# Patient Record
Sex: MALE | Race: WHITE | NOT HISPANIC OR LATINO | Employment: OTHER | URBAN - METROPOLITAN AREA
[De-identification: names, ages, dates, MRNs, and addresses within clinical notes are randomized per-mention and may not be internally consistent; named-entity substitution may affect disease eponyms.]

---

## 2019-10-22 ENCOUNTER — TELEPHONE (OUTPATIENT)
Dept: GASTROENTEROLOGY | Age: 74
End: 2019-10-22

## 2020-08-24 ENCOUNTER — HOSPITAL ENCOUNTER (EMERGENCY)
Facility: HOSPITAL | Age: 75
Discharge: HOME | End: 2020-08-24
Attending: EMERGENCY MEDICINE | Admitting: EMERGENCY MEDICINE
Payer: COMMERCIAL

## 2020-08-24 VITALS
OXYGEN SATURATION: 94 % | DIASTOLIC BLOOD PRESSURE: 64 MMHG | WEIGHT: 209 LBS | HEART RATE: 88 BPM | SYSTOLIC BLOOD PRESSURE: 147 MMHG | RESPIRATION RATE: 20 BRPM | TEMPERATURE: 97.1 F

## 2020-08-24 DIAGNOSIS — H72.91 PERFORATION OF RIGHT TYMPANIC MEMBRANE: Primary | ICD-10-CM

## 2020-08-24 PROCEDURE — 99284 EMERGENCY DEPT VISIT MOD MDM: CPT | Performed by: EMERGENCY MEDICINE

## 2020-08-24 PROCEDURE — 99283 EMERGENCY DEPT VISIT LOW MDM: CPT

## 2020-08-24 RX ORDER — ROSUVASTATIN CALCIUM 40 MG/1
TABLET, COATED ORAL
COMMUNITY
End: 2021-02-04 | Stop reason: SDUPTHER

## 2020-08-24 RX ORDER — BUDESONIDE AND FORMOTEROL FUMARATE DIHYDRATE 160; 4.5 UG/1; UG/1
AEROSOL RESPIRATORY (INHALATION)
COMMUNITY
End: 2021-09-02

## 2020-08-24 RX ORDER — CIPROFLOXACIN AND DEXAMETHASONE 3; 1 MG/ML; MG/ML
4 SUSPENSION/ DROPS AURICULAR (OTIC) 2 TIMES DAILY
Status: DISCONTINUED | OUTPATIENT
Start: 2020-08-24 | End: 2020-08-24

## 2020-08-24 RX ORDER — LOSARTAN POTASSIUM 25 MG/1
TABLET ORAL
COMMUNITY
End: 2021-02-04 | Stop reason: SDUPTHER

## 2020-08-24 RX ORDER — CIPROFLOXACIN HYDROCHLORIDE 3.5 MG/ML
1 SOLUTION/ DROPS TOPICAL ONCE
Status: COMPLETED | OUTPATIENT
Start: 2020-08-24 | End: 2020-08-24

## 2020-08-24 RX ORDER — SPIRONOLACTONE 25 MG/1
TABLET ORAL
COMMUNITY
End: 2020-11-13 | Stop reason: DRUGHIGH

## 2020-08-24 RX ORDER — FLUTICASONE PROPIONATE 50 MCG
SPRAY, SUSPENSION (ML) NASAL AS NEEDED
COMMUNITY

## 2020-08-24 RX ORDER — NITROGLYCERIN 0.4 MG/1
TABLET SUBLINGUAL
COMMUNITY

## 2020-08-24 RX ORDER — ASPIRIN 81 MG/1
TABLET, CHEWABLE ORAL
COMMUNITY

## 2020-08-24 RX ORDER — POTASSIUM CHLORIDE 20 MEQ/1
TABLET, EXTENDED RELEASE ORAL
COMMUNITY
End: 2020-11-13 | Stop reason: DRUGHIGH

## 2020-08-24 RX ORDER — FUROSEMIDE 40 MG/1
TABLET ORAL
COMMUNITY
End: 2020-11-13 | Stop reason: DRUGHIGH

## 2020-08-24 RX ORDER — METOPROLOL SUCCINATE 50 MG/1
50 TABLET, EXTENDED RELEASE ORAL
COMMUNITY
End: 2021-02-04 | Stop reason: ALTCHOICE

## 2020-08-24 RX ADMIN — CIPROFLOXACIN HYDROCHLORIDE 1 DROP: 3 SOLUTION/ DROPS OPHTHALMIC at 20:32

## 2020-08-25 NOTE — ED PROVIDER NOTES
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 psoas entirely reliant on his right ear which has now been damage  States that he is somewhat on healthy at baseline and requires multiple medications and is on oxygen for his COPD  None       Past Medical History:   Diagnosis Date    COPD (chronic obstructive pulmonary disease) ([facility] [address] )        History reviewed  No pertinent surgical history  History reviewed  No pertinent family history  I have reviewed and agree with the history as documented  E-Cigarette/Vaping    E-Cigarette Use Never User      E-Cigarette/Vaping Substances     Social History     Tobacco Use    Smoking status: Never Smoker    Smokeless tobacco: Never Used   Substance Use Topics    Alcohol use: Never     Frequency: Never    Drug use: Never       Review of Systems   Constitutional: Negative for chills and fever  Eyes: Negative for visual disturbance  Respiratory: Negative for shortness of breath  Cardiovascular: Negative for chest pain  Gastrointestinal: Negative for abdominal distention and abdominal pain  Endocrine: Negative for polyuria  Genitourinary: Negative for decreased urine volume and dysuria  Neurological: Negative for dizziness, syncope and weakness  Physical Exam  Physical Exam  Constitutional:       General: He is not in acute distress  Appearance: He is well-developed     HENT:      Head: Normocephalic and atraumatic  Ears:      Comments: Difficult to evaluate right side due to some blood as well as free material at the distal auditory canal   No sensitivity around the ear to palpation  TM appears intact in the visible portions  Area small healing wound at the base of the TM where it meets auditory canal   Eyes:      Conjunctiva/sclera: Conjunctivae normal       Pupils: Pupils are equal, round, and reactive to light  Neck:      Musculoskeletal: Normal range of motion and neck supple  Cardiovascular:      Rate and Rhythm: Normal rate and regular rhythm  Heart sounds: Normal heart sounds  Pulmonary:      Effort: Pulmonary effort is normal  No respiratory distress  Breath sounds: Normal breath sounds  Comments: On 2 L nasal cannula  Abdominal:      General: Bowel sounds are normal       Palpations: Abdomen is soft  Musculoskeletal: Normal range of motion  Skin:     General: Skin is warm and dry  Neurological:      Mental Status: He is alert and oriented to person, place, and time  Psychiatric:         Behavior: Behavior normal          Thought Content: Thought content normal          Judgment: Judgment normal          Vital Signs  ED Triage Vitals [08/24/20 1950]   Temperature Pulse Respirations Blood Pressure SpO2   (!) 97 1 °F (36 2 °C) 91 20 147/64 (!) 88 %      Temp src Heart Rate Source Patient Position - Orthostatic VS BP Location FiO2 (%)   -- -- -- -- --      Pain Score       2           Vitals:    08/24/20 1950   BP: 147/64   Pulse: 91         Visual Acuity      ED Medications  Medications   ciprofloxacin-dexamethasone (CIPRODEX) 0 3-0 1 % otic suspension 4 drop (has no administration in time range)       Diagnostic Studies  Results Reviewed     None                 No orders to display              Procedures  Procedures         ED Course       US AUDIT      Most Recent Value   Initial Alcohol Screen: US AUDIT-C    1   How often do you have a drink containing alcohol?  0 Filed at: 08/24/2020 1952   2  How many drinks containing alcohol do you have on a typical day you are drinking? 0 Filed at: 08/24/2020 1952   3a  Male UNDER 65: How often do you have five or more drinks on one occasion? 0 Filed at: 08/24/2020 1952   3b  FEMALE Any Age, or MALE 65+: How often do you have 4 or more drinks on one occassion? 0 Filed at: 08/24/2020 1952   Audit-C Score  0 Filed at: 08/24/2020 1952                  CRISTOPHER/DAST-10      Most Recent Value   How many times in the past year have you    Used an illegal drug or used a prescription medication for non-medical reasons? Never Filed at: 08/24/2020 1952                                [facility]  Number of Diagnoses or Management Options  Perforation of right tympanic membrane: new and requires workup  Diagnosis management comments: This is a 48-year-old male with question of perforation of his right tympanic membrane with question of early otitis externa  Patient started on Ciprodex  Discussed possible perforation precautions with the patient  Patient appeared clinically well and was still able to converse without difficulty  Discussed warning signs and symptoms with the patient as well as when to return to the emergency department versus follow up with [facility]  Patient states understanding and agreement with the plan  Disposition  Final diagnoses:   Perforation of right tympanic membrane     Time reflects when diagnosis was documented in both MDM as applicable and the Disposition within this note     Time User Action Codes Description Comment    8/24/2020  7:57 PM Fredy Cole [[id number]] Perforation of right tympanic membrane       ED Disposition     ED Disposition Condition Date/Time Comment    Discharge Stable Mon Aug 24, 2020  7:57 PM Annabel Young discharge to home/self care              Follow-up Information     Follow up With Specialties Details Why Contact Info Additional Information    [facility] Call in 1 day  [address] [facility] [id number]  [address] [place] [address] care, [address] [place] [address], [id number], [phone]          Patient's Medications   Discharge Prescriptions    CIPROFLOXACIN-DEXAMETHASONE (CIPRODEX) OTIC SUSPENSION    Administer 4 drops into ears 2 (two) times a day for 7 days       Start Date: 8/24/2020 End Date: 8/31/2020       Order Dose: 4 drops       Quantity: 7 5 mL    Refills: 0     No discharge procedures on file      PDMP Review     None          ED Provider  Electronically Signed by           Lida Foy MD  08/24/20 2005

## 2020-09-26 ENCOUNTER — NURSE TRIAGE (OUTPATIENT)
Dept: OTHER | Facility: OTHER | Age: 75
End: 2020-09-26

## 2020-09-26 DIAGNOSIS — Z20.822 ENCOUNTER FOR LABORATORY TESTING FOR COVID-19 VIRUS: Primary | ICD-10-CM

## 2020-09-26 NOTE — TELEPHONE ENCOUNTER
Pt new to the area, helped pt use [facility] and explained how to set the filters to find new PCP and specialists  Reason for Disposition   [1] COVID-19 infection suspected by caller or triager AND [2] mild symptoms (cough, fever, or others) AND [2] no complications or SOB    Additional Information   [1] Symptoms of COVID-19 (e g , cough, fever, SOB, or others) AND [2] within 14 days of EXPOSURE (close contact) with diagnosed or suspected COVID-19 patient    Answer Assessment - Initial Assessment Questions  1  CLOSE CONTACT: "Who is the person with the confirmed or suspected COVID-19 infection that you were exposed to?"      Friends    2  PLACE of CONTACT: "Where were you when you were exposed to COVID-19?" (e g , home, school, medical waiting room; which city?)      Home    3  TYPE of CONTACT: "How much contact was there?" (e g , sitting next to, live in same house, work in same office, same building)      Visit    4  DURATION of CONTACT: "How long were you in contact with the COVID-19 patient?" (e g , a few seconds, passed by person, a few minutes, live with the patient)      Few hours    5  DATE of CONTACT: "When did you have contact with a COVID-19 patient?" (e g , how many days ago)      A week ago  6  TRAVEL: "Have you traveled out of the country recently?" If so, "When and where?"      * Also ask about out-of-state travel, since the CDC has identified some high-risk cities for community spread in the [address]  * Note: Travel becomes less relevant if there is widespread community transmission where the patient lives  Denies    7  COMMUNITY SPREAD: "Are there lots of cases of COVID-19 (community spread) where you live?" (See public health department website, if unsure)        Unknown    8  SYMPTOMS: "Do you have any symptoms?" (e g , fever, cough, breathing difficulty)      Cough, cold, chest tightness, runny nose, watery eyes, sore throat    10   HIGH RISK: "Do you have any heart or lung problems?  Do you have a weak immune system?" (e g , CHF, COPD, asthma, HIV positive, chemotherapy, renal failure, diabetes mellitus, sickle cell anemia)        COPD    Protocols used: CORONAVIRUS (COVID-19) DIAGNOSED OR SUSPECTED-ADULT-AH, CORONAVIRUS (COVID-19) EXPOSURE-ADULT-AH

## 2020-09-26 NOTE — TELEPHONE ENCOUNTER
Regarding: COVID Test Request - Symptomatic (Family 1/2)  ----- Message from Regine Sacnhez sent at 9/26/2020  3:14 PM EDT -----  "I have COPD, and my doctor recommended that I get tested for COVID because I'm experiencing some cold-like symptoms   I have a runny nose, a cough, tightness in my chest, runny eyes, and a sore throat "

## 2020-09-28 DIAGNOSIS — Z20.822 ENCOUNTER FOR LABORATORY TESTING FOR COVID-19 VIRUS: ICD-10-CM

## 2020-09-28 PROCEDURE — U0003 INFECTIOUS AGENT DETECTION BY NUCLEIC ACID (DNA OR RNA); SEVERE ACUTE RESPIRATORY SYNDROME CORONAVIRUS 2 (SARS-COV-2) (CORONAVIRUS DISEASE [COVID-19]), AMPLIFIED PROBE TECHNIQUE, MAKING USE OF HIGH THROUGHPUT TECHNOLOGIES AS DESCRIBED BY CMS-2020-01-R: HCPCS | Performed by: FAMILY MEDICINE

## 2020-09-30 LAB — SARS-COV-2 RNA SPEC QL NAA+PROBE: NOT DETECTED

## 2020-11-13 ENCOUNTER — OFFICE VISIT (OUTPATIENT)
Dept: CARDIOLOGY CLINIC | Facility: CLINIC | Age: 75
End: 2020-11-13
Payer: COMMERCIAL

## 2020-11-13 VITALS
HEIGHT: 68 IN | SYSTOLIC BLOOD PRESSURE: 112 MMHG | HEART RATE: 72 BPM | DIASTOLIC BLOOD PRESSURE: 76 MMHG | WEIGHT: 214 LBS | BODY MASS INDEX: 32.43 KG/M2

## 2020-11-13 DIAGNOSIS — I10 ESSENTIAL HYPERTENSION: ICD-10-CM

## 2020-11-13 DIAGNOSIS — I65.23 BILATERAL CAROTID ARTERY STENOSIS: ICD-10-CM

## 2020-11-13 DIAGNOSIS — E78.2 MIXED HYPERLIPIDEMIA: ICD-10-CM

## 2020-11-13 DIAGNOSIS — I25.10 CORONARY ARTERY DISEASE INVOLVING NATIVE CORONARY ARTERY OF NATIVE HEART WITHOUT ANGINA PECTORIS: ICD-10-CM

## 2020-11-13 DIAGNOSIS — I50.9 CONGESTIVE HEART FAILURE, UNSPECIFIED HF CHRONICITY, UNSPECIFIED HEART FAILURE TYPE (HCC): Primary | ICD-10-CM

## 2020-11-13 PROCEDURE — 93000 ELECTROCARDIOGRAM COMPLETE: CPT | Performed by: INTERNAL MEDICINE

## 2020-11-13 PROCEDURE — 99204 OFFICE O/P NEW MOD 45 MIN: CPT | Performed by: INTERNAL MEDICINE

## 2020-11-13 RX ORDER — CLOPIDOGREL BISULFATE 75 MG/1
75 TABLET ORAL DAILY
COMMUNITY
End: 2021-01-22 | Stop reason: ALTCHOICE

## 2020-11-30 DIAGNOSIS — I10 ESSENTIAL HYPERTENSION: Primary | ICD-10-CM

## 2020-11-30 RX ORDER — POTASSIUM CHLORIDE 20 MEQ/1
20 TABLET, EXTENDED RELEASE ORAL DAILY PRN
Qty: 90 TABLET | Refills: 5 | Status: ON HOLD
Start: 2020-11-30 | End: 2021-02-21 | Stop reason: CLARIF

## 2020-11-30 RX ORDER — FUROSEMIDE 40 MG/1
40 TABLET ORAL AS NEEDED
Qty: 90 TABLET | Refills: 5 | Status: ON HOLD
Start: 2020-11-30 | End: 2021-02-21 | Stop reason: CLARIF

## 2020-12-11 ENCOUNTER — HOSPITAL ENCOUNTER (OUTPATIENT)
Dept: NON INVASIVE DIAGNOSTICS | Facility: CLINIC | Age: 75
Discharge: HOME | End: 2020-12-11
Payer: COMMERCIAL

## 2020-12-11 DIAGNOSIS — I25.10 CORONARY ARTERY DISEASE INVOLVING NATIVE CORONARY ARTERY OF NATIVE HEART WITHOUT ANGINA PECTORIS: ICD-10-CM

## 2020-12-11 PROCEDURE — 93306 TTE W/DOPPLER COMPLETE: CPT | Performed by: INTERNAL MEDICINE

## 2020-12-11 PROCEDURE — 93306 TTE W/DOPPLER COMPLETE: CPT

## 2020-12-14 ENCOUNTER — HOSPITAL ENCOUNTER (OUTPATIENT)
Dept: NON INVASIVE DIAGNOSTICS | Facility: HOSPITAL | Age: 75
Discharge: HOME | End: 2020-12-14
Attending: INTERNAL MEDICINE
Payer: COMMERCIAL

## 2020-12-14 DIAGNOSIS — I65.23 BILATERAL CAROTID ARTERY STENOSIS: ICD-10-CM

## 2020-12-14 PROCEDURE — 93880 EXTRACRANIAL BILAT STUDY: CPT | Performed by: SURGERY

## 2020-12-14 PROCEDURE — 93880 EXTRACRANIAL BILAT STUDY: CPT

## 2021-01-06 LAB
BUN SERPL-MCNC: 25 MG/DL (ref 8–27)
BUN/CREAT SERPL: 20 (ref 10–24)
CHLORIDE SERPL-SCNC: 100 MMOL/L (ref 96–106)
CHOLEST SERPL-MCNC: 139 MG/DL (ref 100–199)
CO2 SERPL-SCNC: 30 MMOL/L (ref 20–29)
CREAT SERPL-MCNC: 1.26 MG/DL (ref 0.76–1.27)
GLUCOSE SERPL-MCNC: 101 MG/DL (ref 65–99)
HDLC SERPL-MCNC: 55 MG/DL
LDLC SERPL CALC-MCNC: 67 MG/DL (ref 0–99)
POTASSIUM SERPL-SCNC: 4.5 MMOL/L (ref 3.5–5.2)
SL AMB EGFR AFRICAN AMERICAN: 64 ML/MIN/1.73
SL AMB EGFR NON AFRICAN AMERICAN: 55 ML/MIN/1.73
SL AMB VLDL CHOLESTEROL CALC: 17 MG/DL (ref 5–40)
SODIUM SERPL-SCNC: 143 MMOL/L (ref 134–144)
TRIGL SERPL-MCNC: 88 MG/DL (ref 0–149)

## 2021-01-07 ENCOUNTER — OFFICE VISIT (OUTPATIENT)
Dept: FAMILY MEDICINE CLINIC | Facility: CLINIC | Age: 76
End: 2021-01-07
Payer: COMMERCIAL

## 2021-01-07 VITALS
HEIGHT: 68 IN | BODY MASS INDEX: 32.77 KG/M2 | HEART RATE: 72 BPM | SYSTOLIC BLOOD PRESSURE: 120 MMHG | DIASTOLIC BLOOD PRESSURE: 70 MMHG | WEIGHT: 216.2 LBS | OXYGEN SATURATION: 94 % | RESPIRATION RATE: 16 BRPM | TEMPERATURE: 98 F

## 2021-01-07 DIAGNOSIS — Z76.89 ENCOUNTER TO ESTABLISH CARE: ICD-10-CM

## 2021-01-07 DIAGNOSIS — R10.84 GENERALIZED ABDOMINAL PAIN: Primary | ICD-10-CM

## 2021-01-07 DIAGNOSIS — G56.22 CUBITAL TUNNEL SYNDROME ON LEFT: ICD-10-CM

## 2021-01-07 PROCEDURE — 99204 OFFICE O/P NEW MOD 45 MIN: CPT | Performed by: FAMILY MEDICINE

## 2021-01-07 PROCEDURE — 3725F SCREEN DEPRESSION PERFORMED: CPT | Performed by: FAMILY MEDICINE

## 2021-01-07 PROCEDURE — 3288F FALL RISK ASSESSMENT DOCD: CPT | Performed by: FAMILY MEDICINE

## 2021-01-07 PROCEDURE — 1101F PT FALLS ASSESS-DOCD LE1/YR: CPT | Performed by: FAMILY MEDICINE

## 2021-01-07 RX ORDER — AZITHROMYCIN 250 MG/1
TABLET, FILM COATED ORAL
COMMUNITY
Start: 2020-10-01 | End: 2021-02-04 | Stop reason: SDUPTHER

## 2021-01-07 RX ORDER — GABAPENTIN 100 MG/1
CAPSULE ORAL
COMMUNITY
Start: 2020-12-07 | End: 2021-02-04 | Stop reason: ALTCHOICE

## 2021-01-07 RX ORDER — SPIRONOLACTONE 25 MG/1
TABLET ORAL
COMMUNITY
Start: 2020-10-12 | End: 2021-01-22

## 2021-01-07 RX ORDER — POTASSIUM CHLORIDE 7.45 MG/ML
INJECTION INTRAVENOUS
COMMUNITY
Start: 2020-10-12 | End: 2021-02-04 | Stop reason: ALTCHOICE

## 2021-01-07 NOTE — PROGRESS NOTES
Parrish Presley 1945 male MRN: [id number]  [facility] [place]    Visit to Establish Care: Family Medicine    Assessment/Plan     Generalized abdominal pain  - Unclear etiology of chronic abdominal and back pain, previously worked up by prior PCP, possibly secondary to chronic use of accessory respiratory muscles in setting of severe COPD, hernia on differential  - Referral to general surgery for evaluation, possible hernia as etiology for pain? Could consider CT abdomen   - Return precautions discussed     Kiel Landeros was seen today for establish care  Diagnoses and all orders for this visit:    Generalized abdominal pain  -     Ambulatory referral to General Surgery; Future    Cubital tunnel syndrome on left  -     Ambulatory referral to Orthopedic Surgery; Future    Encounter to establish care        In addition to the above, the patient was counseled on general preventative health care subjects, including but not limited to:  - Nutrition, healthy weight, aerobic and weight-bearing exercise  - Mental health, social support, and self care  - Advised of the importance of dental hygiene and routine dental visits   - Patient made aware of  services at the office  SUBJECTIVE    HPI:  Parrish Presley is a 67 y o  male who presented to establish care with this family medicine practice  Pain- In December, he was talking to his prior PCP about rib pain, getting worse, started in November or so  It happens all the time, located on both sides, more so on the right side  Pain is all through the lower ribs, constant pain with intermittent sharpness with movement or coughing  Continued lower back pain as well  History of COPD, reports the bottom half of his lungs don't work, was following with pulmonology specialist  Has sleep apnea, uses CPAP and portable oxygen at night typically oxygen runs in upper 80s   Breathing has gotten progressively worse over past year, upcoming appointment with Dr Parish Olivares 1/22  Nonspecific upper abdominal pain with this, fairly regular bowel movements, no blood in stool  He is able to exhale much longer if lying flat and pushing on stomach  Back pain started prior to all of this, occurring for at least a year  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, no history of CVA or TIA, improved, no new leg numbness or weakness  Left arm numbness- Would like referral to hand specialist  Left arm numbness started a month ago, in pinky finger and ring finger just on left  Lost perception in pinky finger, no weakness  No elbow pain or shoulder pain  He also mentions cold feet and hands, will follow-up next visit  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, left ear  Review of Systems   Constitutional: Negative for chills and fever  HENT: Positive for hearing loss  Eyes: Negative for discharge and redness  Respiratory: Positive for shortness of breath (chronic COPD)  Negative for cough, chest tightness and wheezing  Cardiovascular: Negative for chest pain, palpitations and leg swelling  Gastrointestinal: Positive for abdominal pain  Negative for blood in stool, constipation, diarrhea, nausea and vomiting  Genitourinary: Negative for decreased urine volume, difficulty urinating and dysuria  Musculoskeletal: Negative for gait problem  Skin: Negative for rash  Neurological: Positive for weakness and numbness  Historical Information   Past Medical History:   Diagnosis Date    COPD [facility]chronic obstructive pulmonary disease) ([facility] [address] )     Coronary artery disease     history of stenting    Hyperlipidemia     Hypertension      History reviewed  No pertinent surgical history  No family history on file    Social History     Socioeconomic History    Marital status: /Civil Union     Spouse name: Not on file    Number of children: Not on file    Years of education: Not on file    Highest education level: Not on file   Occupational History    Not on file   Social Needs    Financial resource strain: Not on file    Food insecurity     Worry: Not on file     Inability: Not on file    Transportation needs     Medical: Not on file     Non-medical: Not on file   Tobacco Use    Smoking status: Former Smoker     Quit date:      Years since quittin 0    Smokeless tobacco: Never Used   Substance and Sexual Activity    Alcohol use: Never     Frequency: Never    Drug use: Never    Sexual activity: Not on file   Lifestyle    Physical activity     Days per week: Not on file     Minutes per session: Not on file    Stress: Not on file   Relationships    Social connections     Talks on phone: Not on file     Gets together: Not on file     Attends Orthodox service: Not on file     Active member of club or organization: Not on file     Attends meetings of clubs or organizations: Not on file     Relationship status: Not on file    Intimate partner violence     Fear of current or ex partner: Not on file     Emotionally abused: Not on file     Physically abused: Not on file     Forced sexual activity: Not on file   Other Topics Concern    Not on file   Social History Narrative    Not on file           Medications:      Current Outpatient Medications:     albuterol (5 mg/mL) 0 5 % nebulizer solution, INHALE  INHALATION, Disp: , Rfl:     aspirin 81 mg chewable tablet, CHEW ONE TABLET BY MOUTH EVERY DAY, Disp: , Rfl:     azithromycin (ZITHROMAX) 250 mg tablet,  See Instructions, TAKE 1 TABLET EVERY MONDAY, WEDNESDAY AND FRIDAY, # 26 tab(s), 5 Refill(s), Pharmacy: [facility], 169, 20 11:08:00 EDT, Height/Length, cm, 90 718, 20 11:08:00 EDT, Weight Measured, kg, Disp: , Rfl:     budesonide-formoterol (SYMBICORT) 160-4 5 mcg/act inhaler, INHALE TWO INHALATIONS BY MOUTH TWICE A DAY - RINSE MOUTH AFTER USE, Disp: , Rfl:     fluticasone (FLONASE) 50 mcg/act nasal spray, USE 1 SPRAY IN EACH NOSTRIL TWICE A DAY, Disp: , Rfl:    furosemide (LASIX) 40 mg tablet, Take 1 tablet (40 mg total) by mouth as needed (only when legs swollen in am ), Disp: 90 tablet, Rfl: 5    gabapentin (NEURONTIN) 100 mg capsule, , Disp: , Rfl:     losartan (COZAAR) 25 mg tablet, TAKE ONE TABLET BY MOUTH ONCE DAILY, Disp: , Rfl:     metoprolol succinate (KAPSPARGO SPRINKLE) 25 MG extended-release capsule,  = 1 tab(s), Oral, bid, # 180 tab(s), 1 Refill(s), Pharmacy: [facility], [address], 06/01/20 11:08:00 EDT, Height/Length, cm, 90 718, 06/01/20 11:08:00 EDT, Weight Measured, kg, Disp: , Rfl:     metoprolol succinate (TOPROL-XL) 50 mg 24 hr tablet, 50 mg , Disp: , Rfl:     nitroglycerin (NITROSTAT) 0 4 mg SL tablet, DISSOLVE ONE TABLET UNDER THE TONGUE  PRN, Disp: , Rfl:     potassium chloride (K-DUR,KLOR-CON) 20 mEq tablet, Take 1 tablet (20 mEq total) by mouth daily as needed (morning leg swelling ), Disp: 90 tablet, Rfl: 5    potassium chloride 10 mEq,  = 1 tab(s), Oral, daily, # 90 tab(s), 1 Refill(s), Pharmacy: [facility], [facility], 06/01/20 11:08:00 EDT, Height/Length, cm, 90 718, 06/01/20 11:08:00 EDT, Weight Measured, kg, Disp: , Rfl:     rosuvastatin (CRESTOR) 40 MG tablet, TAKE ONE TABLET BY MOUTH ONCE DAILY, Disp: , Rfl:     spironolactone (ALDACTONE) 25 mg tablet,  = 1 tab(s), Oral, daily, # 90 tab(s), 1 Refill(s), Pharmacy: [facility], [facility], 06/01/20 11:08:00 EDT, Height/Length, cm, 90 718, 06/01/20 11:08:00 EDT, Weight Measured, kg, Disp: , Rfl:     tiotropium (SPIRIVA) 18 mcg inhalation capsule, INHALE CONTENTS OF ONE CAPSULE BY MOUTH ONCE DAILY USING HANDIHALER DEVICE, Disp: , Rfl:     ciprofloxacin-dexamethasone (Ciprodex) otic suspension, Administer 4 drops into ears 2 (two) times a day for 7 days (Patient not taking: Reported on 11/13/2020), Disp: 7 5 mL, Rfl: 0    ciprofloxacin-hydrocortisone (CIPRO HC OTIC) otic suspension, Administer 3 drops to the right ear 2 (two) times a day (Patient not taking: Reported on 11/13/2020), Disp: 10 mL, Rfl: 0    clopidogrel (PLAVIX) 75 mg tablet, Take 75 mg by mouth daily, Disp: , Rfl:       Physical Exam:    Physical Exam  Vitals signs reviewed  Constitutional:       General: He is not in acute distress  Appearance: Normal appearance  He is not ill-appearing, toxic-appearing or diaphoretic  HENT:      Head: Normocephalic and atraumatic  Eyes:      General:         Right eye: No discharge  Left eye: No discharge  Extraocular Movements: Extraocular movements intact  Conjunctiva/sclera: Conjunctivae normal    Neck:      Musculoskeletal: Normal range of motion and neck supple  No neck rigidity  Cardiovascular:      Rate and Rhythm: Normal rate and regular rhythm  Heart sounds: Normal heart sounds  No murmur  No friction rub  No gallop  Pulmonary:      Effort: Pulmonary effort is normal  No respiratory distress  Breath sounds: Normal breath sounds  No stridor  No wheezing or rhonchi  Chest:      Chest wall: No deformity or tenderness  Abdominal:      General: Bowel sounds are normal  There is no distension  Palpations: Abdomen is soft  There is no mass  Tenderness: There is abdominal tenderness  There is no guarding or rebound  Comments: Diastasis recti    Musculoskeletal:         General: No tenderness or signs of injury  Left elbow: He exhibits normal range of motion, no swelling, no effusion and no deformity  No tenderness found  Lumbar back: He exhibits spasm  Right lower leg: No edema  Left lower leg: No edema  Comments: Positive Tinel's at elbow   Skin:     General: Skin is warm  Findings: No erythema or rash  Neurological:      Mental Status: He is alert and oriented to person, place, and time  Motor: No weakness     Psychiatric:         Mood and Affect: Mood normal          Behavior: Behavior normal             Future Appointments   Date Time Provider Department Center   1/15/2021 11:00 AM Jonathan Shea MD Gen [facility]   1/22/2021  9:00 AM [email or web address]   2/4/2021 11:00 AM DO ALYSSA Maldonado  FP  [facility]

## 2021-01-11 VITALS — BODY MASS INDEX: 32.74 KG/M2 | HEIGHT: 68 IN | WEIGHT: 216 LBS

## 2021-01-11 DIAGNOSIS — G56.22 CUBITAL TUNNEL SYNDROME ON LEFT: Primary | ICD-10-CM

## 2021-01-11 PROBLEM — R42 VERTIGO: Status: ACTIVE | Noted: 2021-01-11

## 2021-01-11 PROBLEM — R73.03 PREDIABETES: Status: ACTIVE | Noted: 2021-01-11

## 2021-01-11 PROBLEM — E87.6 CHRONIC HYPOKALEMIA: Status: ACTIVE | Noted: 2021-01-11

## 2021-01-11 PROBLEM — I50.22 CHRONIC SYSTOLIC CONGESTIVE HEART FAILURE (HCC): Status: ACTIVE | Noted: 2021-01-11

## 2021-01-11 PROBLEM — H53.9 VISUAL DISTURBANCE: Status: ACTIVE | Noted: 2021-01-11

## 2021-01-11 PROBLEM — K21.9 GASTROESOPHAGEAL REFLUX DISEASE: Status: ACTIVE | Noted: 2021-01-11

## 2021-01-11 PROBLEM — I50.20 CONGESTIVE HEART FAILURE WITH LEFT VENTRICULAR SYSTOLIC DYSFUNCTION (HCC): Status: ACTIVE | Noted: 2021-01-11

## 2021-01-11 PROBLEM — H25.10 NUCLEAR SENILE CATARACT: Status: ACTIVE | Noted: 2021-01-11

## 2021-01-11 PROBLEM — R10.84 GENERALIZED ABDOMINAL PAIN: Status: ACTIVE | Noted: 2021-01-11

## 2021-01-11 PROBLEM — R09.02 HYPOXEMIA: Status: ACTIVE | Noted: 2021-01-11

## 2021-01-11 PROBLEM — E04.1 THYROID NODULE: Status: ACTIVE | Noted: 2021-01-11

## 2021-01-11 PROBLEM — H91.90 HEARING LOSS: Status: ACTIVE | Noted: 2021-01-11

## 2021-01-11 PROBLEM — Z95.5 STENTED CORONARY ARTERY: Status: ACTIVE | Noted: 2021-01-11

## 2021-01-11 PROBLEM — M47.819 OSTEOARTHRITIS OF SPINAL FACET JOINT: Status: ACTIVE | Noted: 2021-01-11

## 2021-01-11 PROBLEM — R91.1 SOLITARY PULMONARY NODULE: Status: ACTIVE | Noted: 2021-01-11

## 2021-01-11 PROBLEM — H04.129 DRY EYE SYNDROME: Status: ACTIVE | Noted: 2021-01-11

## 2021-01-11 PROBLEM — J44.9 COPD (CHRONIC OBSTRUCTIVE PULMONARY DISEASE) (HCC): Status: ACTIVE | Noted: 2021-01-11

## 2021-01-11 PROBLEM — I25.9 CHRONIC ISCHEMIC HEART DISEASE: Status: ACTIVE | Noted: 2021-01-11

## 2021-01-11 PROBLEM — I10 BENIGN ESSENTIAL HYPERTENSION: Status: RESOLVED | Noted: 2021-01-11 | Resolved: 2021-01-11

## 2021-01-11 PROBLEM — M54.16 LUMBAR RADICULOPATHY: Status: ACTIVE | Noted: 2021-01-11

## 2021-01-11 PROBLEM — Z99.81 OXYGEN DEPENDENT: Status: ACTIVE | Noted: 2021-01-11

## 2021-01-11 PROBLEM — K57.30 DIVERTICULAR DISEASE OF COLON: Status: ACTIVE | Noted: 2021-01-11

## 2021-01-11 PROBLEM — I65.29 STENOSIS OF CAROTID ARTERY: Status: ACTIVE | Noted: 2021-01-11

## 2021-01-11 PROBLEM — I65.29 STENOSIS OF CAROTID ARTERY: Status: RESOLVED | Noted: 2021-01-11 | Resolved: 2021-01-11

## 2021-01-11 PROBLEM — G47.30 SLEEP APNEA: Status: ACTIVE | Noted: 2021-01-11

## 2021-01-11 PROBLEM — R97.20 HIGH PROSTATE SPECIFIC ANTIGEN (PSA): Status: ACTIVE | Noted: 2021-01-11

## 2021-01-11 PROBLEM — R73.9 HYPERGLYCEMIA: Status: ACTIVE | Noted: 2021-01-11

## 2021-01-11 PROBLEM — E04.2 MULTINODULAR GOITER: Status: ACTIVE | Noted: 2021-01-11

## 2021-01-11 PROBLEM — H52.31 ANISOMETROPIA: Status: ACTIVE | Noted: 2021-01-11

## 2021-01-11 PROBLEM — J44.9 CHRONIC OBSTRUCTIVE PULMONARY DISEASE (COPD) (HCC): Status: RESOLVED | Noted: 2021-01-11 | Resolved: 2021-01-11

## 2021-01-11 PROBLEM — F32.A DEPRESSION: Status: ACTIVE | Noted: 2021-01-11

## 2021-01-11 PROBLEM — F52.8 PSYCHOSEXUAL DYSFUNCTION WITH INHIBITED SEXUAL EXCITEMENT: Status: ACTIVE | Noted: 2021-01-11

## 2021-01-11 PROBLEM — Z98.890 S/P CAROTID ENDARTERECTOMY: Status: ACTIVE | Noted: 2021-01-11

## 2021-01-11 PROBLEM — Z96.1 LENS REPLACED BY OTHER MEANS: Status: ACTIVE | Noted: 2021-01-11

## 2021-01-11 PROBLEM — E66.9 OBESITY: Status: ACTIVE | Noted: 2021-01-11

## 2021-01-11 PROBLEM — I10 BENIGN ESSENTIAL HYPERTENSION: Status: ACTIVE | Noted: 2021-01-11

## 2021-01-11 PROBLEM — I50.22 CHRONIC SYSTOLIC CONGESTIVE HEART FAILURE (HCC): Status: RESOLVED | Noted: 2021-01-11 | Resolved: 2021-01-11

## 2021-01-11 PROBLEM — J44.9 CHRONIC OBSTRUCTIVE PULMONARY DISEASE (COPD) (HCC): Status: ACTIVE | Noted: 2021-01-11

## 2021-01-11 PROBLEM — M16.9 OSTEOARTHRITIS OF HIP: Status: ACTIVE | Noted: 2021-01-11

## 2021-01-11 PROBLEM — I65.29 OCCLUSION AND STENOSIS OF CAROTID ARTERY: Status: ACTIVE | Noted: 2021-01-11

## 2021-01-11 PROCEDURE — 99203 OFFICE O/P NEW LOW 30 MIN: CPT | Performed by: ORTHOPAEDIC SURGERY

## 2021-01-11 NOTE — PROGRESS NOTES
Assessment/Plan:  Assessment/Plan   Diagnoses and all orders for this visit:    Cubital tunnel syndrome on left  -     Ambulatory referral to Orthopedic Surgery  -     EMG 1 Limb; Future    Discussed with patient that today's physical exam and subjective findings are consistent with cubital tunnel syndrome of the left elbow  Patient is being referred for EMG study of the left upper extremity for further evaluation  He will be seen for follow-up after diagnostic study is complete  Subjective:   Patient ID: Luis A Pepe is a 67 y o  male  HPI    Patient presents for consultation at the request of Manolo Carlson DO in regards to left hand numbness and tingling x2 months  Patient denies any specific incident of injury  Notes that his symptoms are bothersome both at night, and with prolonged gripping motions such as driving  He describes numbness and tingling that extends down the ulnar aspect of the forearm and into the small finger and ring fingers of the left hand  He reports occasional feelings of weakness, but denies any associated bruising, swelling, feelings of instability, or mechanical symptoms  The following portions of the patient's history were reviewed and updated as appropriate: allergies, current medications, past family history, past medical history, past social history, past surgical history and problem list     Past Medical History:   Diagnosis Date    COPD (chronic obstructive pulmonary disease) ([facility] [address] )     Coronary artery disease     history of stenting    Hyperlipidemia     Hypertension      History reviewed  No pertinent surgical history  History reviewed  No pertinent family history      Social History     Socioeconomic History    Marital status: /Civil Union     Spouse name: None    Number of children: None    Years of education: None    Highest education level: None   Occupational History    None   Social Needs    Financial resource strain: None   Patsy-Patrice insecurity     Worry: None     Inability: None    Transportation needs     Medical: None     Non-medical: None   Tobacco Use    Smoking status: Former Smoker     Quit date:      Years since quittin 0    Smokeless tobacco: Never Used   Substance and Sexual Activity    Alcohol use: Never     Frequency: Never    Drug use: Never    Sexual activity: None   Lifestyle    Physical activity     Days per week: None     Minutes per session: None    Stress: None   Relationships    Social connections     Talks on phone: None     Gets together: None     Attends Buddhism service: None     Active member of club or organization: None     Attends meetings of clubs or organizations: None     Relationship status: None    Intimate partner violence     Fear of current or ex partner: None     Emotionally abused: None     Physically abused: None     Forced sexual activity: None   Other Topics Concern    None   Social History Narrative    None       Current Outpatient Medications:     albuterol (5 mg/mL) 0 5 % nebulizer solution, INHALE  INHALATION, Disp: , Rfl:     aspirin 81 mg chewable tablet, CHEW ONE TABLET BY MOUTH EVERY DAY, Disp: , Rfl:     azithromycin (ZITHROMAX) 250 mg tablet,  See Instructions, TAKE 1 TABLET EVERY MONDAY, WEDNESDAY AND FRIDAY, # 26 tab(s), 5 Refill(s), Pharmacy: [facility], 169, 20 11:08:00 EDT, Height/Length, cm, 90 718, 20 11:08:00 EDT, Weight Measured, kg, Disp: , Rfl:     budesonide-formoterol (SYMBICORT) 160-4 5 mcg/act inhaler, INHALE TWO INHALATIONS BY MOUTH TWICE A DAY - RINSE MOUTH AFTER USE, Disp: , Rfl:     clopidogrel (PLAVIX) 75 mg tablet, Take 75 mg by mouth daily, Disp: , Rfl:     fluticasone (FLONASE) 50 mcg/act nasal spray, USE 1 SPRAY IN EACH NOSTRIL TWICE A DAY, Disp: , Rfl:     furosemide (LASIX) 40 mg tablet, Take 1 tablet (40 mg total) by mouth as needed (only when legs swollen in am ), Disp: 90 tablet, Rfl: 5    gabapentin (NEURONTIN) 100 mg capsule, , Disp: , Rfl:     losartan (COZAAR) 25 mg tablet, TAKE ONE TABLET BY MOUTH ONCE DAILY, Disp: , Rfl:     metoprolol succinate (KAPSPARGO SPRINKLE) 25 MG extended-release capsule,  = 1 tab(s), Oral, bid, # 180 tab(s), 1 Refill(s), Pharmacy: [facility], 169, 06/01/20 11:08:00 EDT, Height/Length, cm, 90 718, 06/01/20 11:08:00 EDT, Weight Measured, kg, Disp: , Rfl:     metoprolol succinate (TOPROL-XL) 50 mg 24 hr tablet, 50 mg , Disp: , Rfl:     nitroglycerin (NITROSTAT) 0 4 mg SL tablet, DISSOLVE ONE TABLET UNDER THE TONGUE  PRN, Disp: , Rfl:     potassium chloride (K-DUR,KLOR-CON) 20 mEq tablet, Take 1 tablet (20 mEq total) by mouth daily as needed (morning leg swelling ), Disp: 90 tablet, Rfl: 5    potassium chloride 10 mEq,  = 1 tab(s), Oral, daily, # 90 tab(s), 1 Refill(s), [facility]: [facility], 169, 06/01/20 11:08:00 EDT, Height/Length, cm, 90 718, 06/01/20 11:08:00 EDT, Weight Measured, kg, Disp: , Rfl:     rosuvastatin (CRESTOR) 40 MG tablet, TAKE ONE TABLET BY MOUTH ONCE DAILY, Disp: , Rfl:     spironolactone (ALDACTONE) 25 mg tablet,  = 1 tab(s), Oral, daily, # 90 tab(s), 1 Refill(s), Pharmacy: [facility], 169, 06/01/20 11:08:00 EDT, Height/Length, cm, 90 718, 06/01/20 11:08:00 EDT, Weight Measured, kg, Disp: , Rfl:     tiotropium (SPIRIVA) 18 mcg inhalation capsule, INHALE CONTENTS OF ONE CAPSULE BY MOUTH ONCE DAILY USING HANDIHALER DEVICE, Disp: , Rfl:     ciprofloxacin-dexamethasone (Ciprodex) otic suspension, Administer 4 drops into ears 2 (two) times a day for 7 days (Patient not taking: Reported on 11/13/2020), Disp: 7 5 mL, Rfl: 0    ciprofloxacin-hydrocortisone (CIPRO HC OTIC) otic suspension, Administer 3 drops to the right ear 2 (two) times a day (Patient not taking: Reported on 11/13/2020), Disp: 10 mL, Rfl: 0    Allergies   Allergen Reactions    Influenza Vaccine Live     Lisinopril Swelling    Pneumococcal Vaccine Swelling    Tetanus Antitoxin     Tetanus Toxoid Swelling       Review of Systems   Constitutional: Negative for chills, fever and unexpected weight change  HENT: Negative for hearing loss, nosebleeds and sore throat  Eyes: Negative for pain, redness and visual disturbance  Respiratory: Negative for cough, shortness of breath and wheezing  Cardiovascular: Negative for chest pain, palpitations and leg swelling  Gastrointestinal: Negative for abdominal pain, nausea and vomiting  Endocrine: Negative for polydipsia and polyuria  Genitourinary: Negative for dysuria and hematuria  Musculoskeletal:        As noted in HPI   Skin: Negative for rash and wound  Neurological: Positive for numbness (left upper extremity)  Negative for dizziness and headaches  Psychiatric/Behavioral: Negative for decreased concentration and suicidal ideas  The patient is not nervous/anxious  Objective:  Ht 5' 8" (1 727 m)   Wt 98 kg (216 lb)   BMI 32 84 kg/m²     Ortho Exam   Left elbow -   No obvious anatomical deformity  Skin is warm and dry to touch with no signs of erythema, ecchymosis, or infection  Active and passive ROM 0 degrees-125 degrees  Elbow is stable to varus and valgus stress  Negative ulnar nerve subluxation  Mildly positive ulnar nerve flexion compression test  Mildly positive Tinel's at carpal tunnel  Negative Tinel's at cubital tunnel  2+ distal radial pulse with brisk capillary refill to the fingers  Radial, median, and ulnar motor and sensory distributions intact  Sensation light touch intact distally    Physical Exam  Constitutional:       Appearance: He is well-developed  HENT:      Right Ear: External ear normal       Left Ear: External ear normal       Nose: Nose normal    Eyes:      Conjunctiva/sclera: Conjunctivae normal       Pupils: Pupils are equal, round, and reactive to light  Neck:      Musculoskeletal: Normal range of motion     Pulmonary:      Effort: Pulmonary effort is normal    Musculoskeletal:      Comments:  As noted in HPI   Skin:     General: Skin is warm and dry  Neurological:      Mental Status: He is alert and oriented to person, place, and time  Psychiatric:         Behavior: Behavior normal          Thought Content:  Thought content normal          Judgment: Judgment normal        Imaging:  No imaging reviewed this visit    Scribe Attestation    I,:  Demarco Cook am acting as a scribe while in the presence of the attending physician :       I,:  Raine Vyas DO personally performed the services described in this documentation    as scribed in my presence :

## 2021-01-12 NOTE — ASSESSMENT & PLAN NOTE
- Unclear etiology of chronic abdominal and back pain, previously worked up by prior PCP, possibly secondary to chronic use of accessory respiratory muscles in setting of severe COPD, hernia on differential  - Referral to general surgery for evaluation, possible hernia as etiology for pain?  Could consider CT abdomen   - Return precautions discussed

## 2021-01-15 ENCOUNTER — CONSULT (OUTPATIENT)
Dept: SURGERY | Facility: CLINIC | Age: 76
End: 2021-01-15
Payer: COMMERCIAL

## 2021-01-15 ENCOUNTER — APPOINTMENT (OUTPATIENT)
Dept: RADIOLOGY | Facility: CLINIC | Age: 76
End: 2021-01-15
Payer: COMMERCIAL

## 2021-01-15 VITALS
SYSTOLIC BLOOD PRESSURE: 120 MMHG | TEMPERATURE: 97.1 F | HEART RATE: 68 BPM | DIASTOLIC BLOOD PRESSURE: 62 MMHG | RESPIRATION RATE: 16 BRPM | WEIGHT: 217 LBS | HEIGHT: 68 IN | BODY MASS INDEX: 32.89 KG/M2

## 2021-01-15 DIAGNOSIS — R07.89 COSTOCHONDRAL CHEST PAIN: Primary | ICD-10-CM

## 2021-01-15 DIAGNOSIS — R10.84 GENERALIZED ABDOMINAL PAIN: ICD-10-CM

## 2021-01-15 DIAGNOSIS — R07.89 COSTOCHONDRAL CHEST PAIN: ICD-10-CM

## 2021-01-15 PROCEDURE — 71111 X-RAY EXAM RIBS/CHEST4/> VWS: CPT

## 2021-01-15 PROCEDURE — 99203 OFFICE O/P NEW LOW 30 MIN: CPT | Performed by: SURGERY

## 2021-01-15 PROCEDURE — 72070 X-RAY EXAM THORAC SPINE 2VWS: CPT

## 2021-01-15 PROCEDURE — 3074F SYST BP LT 130 MM HG: CPT | Performed by: SURGERY

## 2021-01-15 PROCEDURE — 3078F DIAST BP <80 MM HG: CPT | Performed by: SURGERY

## 2021-01-15 NOTE — ASSESSMENT & PLAN NOTE
The patient is a pleasant 76-year-old male with a past medical history significant for oxygen dependent COPD presenting with bilateral subcostal abdominal wall pain in the upper quadrants bilaterally  Patient notes a chronic low-grade discomfort in the upper abdomen bilaterally  He does report acute flares of pain lasting very briefly associated with coughing  He has had no specific testing or investigations into its etiology previously  Today on physical examination the patient is well-nourished well-appearing male  He is in no acute distress  Awake alert oriented  Competent reliable as a historian  Inspection and palpation of the abdominal wall reveals a normal appearing thoracic cage  He has no tenderness to percussion or palpation sent in the upper abdomen bilaterally as well as over the sternum or costal margin  His history and physical examination is most suggestive of an acute on chronic costochondritis  To further investigate his symptoms I have ordered rib films as well as a chest x-ray and thoracic spine imaging  I have room advised him to take Tylenol or ibuprofen on a as needed basis  I look for to seeing him back after the imaging studies at which point will make additional diagnostic and therapeutic treatment recommendations accordingly

## 2021-01-15 NOTE — PROGRESS NOTES
Assessment/Plan:    Costochondral chest pain  The patient is a pleasant 41-year-old male with a past medical history significant for oxygen dependent COPD presenting with bilateral subcostal abdominal wall pain in the upper quadrants bilaterally  Patient notes a chronic low-grade discomfort in the upper abdomen bilaterally  He does report acute flares of pain lasting very briefly associated with coughing  He has had no specific testing or investigations into its etiology previously  Today on physical examination the patient is well-nourished well-appearing male  He is in no acute distress  Awake alert oriented  Competent reliable as a historian  Inspection and palpation of the abdominal wall reveals a normal appearing thoracic cage  He has no tenderness to percussion or palpation sent in the upper abdomen bilaterally as well as over the sternum or costal margin  His history and physical examination is most suggestive of an acute on chronic costochondritis  To further investigate his symptoms I have ordered rib films as well as a chest x-ray and thoracic spine imaging  I have room advised him to take Tylenol or ibuprofen on a as needed basis  I look for to seeing him back after the imaging studies at which point will make additional diagnostic and therapeutic treatment recommendations accordingly  Subjective:      Patient ID: Boyd Snellen is a 67 y o  male  Patient is here today for generalized abdominal pain that has been present 3 months or less  Stated that the pain starts mid abdomen that radiates to his lower back  No nausea, vomiting,chills, but states that sometimes he gets a fever in the evening  Dena Rogers MA          Review of Systems   Constitutional: Negative for chills and fever  HENT: Negative for ear pain and sore throat  Eyes: Negative for pain and visual disturbance  Respiratory: Positive for cough and shortness of breath      Cardiovascular: Negative for chest pain and palpitations  Gastrointestinal: Positive for abdominal distention and abdominal pain  Negative for vomiting  The abdominal distension is exacerbated by his CPAP   Genitourinary: Negative for dysuria and hematuria  Musculoskeletal: Negative for arthralgias and back pain  Skin: Negative for color change and rash  Neurological: Negative for seizures and syncope  All other systems reviewed and are negative  Objective:      /62 (BP Location: Left arm, Patient Position: Sitting, Cuff Size: Standard)   Pulse 68   Temp (!) 97 1 °F (36 2 °C) (Tympanic)   Resp 16   Ht 5' 8" (1 727 m)   Wt 98 4 kg (217 lb)   BMI 32 99 kg/m²          Physical Exam  Vitals signs and nursing note reviewed  Constitutional:       Appearance: He is well-developed  HENT:      Head: Normocephalic and atraumatic  Eyes:      Conjunctiva/sclera: Conjunctivae normal       Pupils: Pupils are equal, round, and reactive to light  Neck:      Musculoskeletal: Normal range of motion and neck supple  Cardiovascular:      Rate and Rhythm: Normal rate and regular rhythm  Pulmonary:      Effort: Pulmonary effort is normal       Breath sounds: Normal breath sounds  Abdominal:      General: Bowel sounds are normal       Palpations: Abdomen is soft  Musculoskeletal: Normal range of motion  Skin:     General: Skin is warm and dry  Neurological:      Mental Status: He is alert and oriented to person, place, and time  Psychiatric:         Behavior: Behavior normal          Thought Content:  Thought content normal          Judgment: Judgment normal

## 2021-01-15 NOTE — PATIENT INSTRUCTIONS
1  Tylenol 650mg or ibuprofen 400mg by mouth every 6 hours as needed for pain    Costochondritis   AMBULATORY CARE:   Costochondritis  is a condition that causes pain in the cartilage that connects your ribs to your sternum (breastbone)  Cartilage is the tough, bendable tissue that protects your bones  Common symptoms include the following:   · Sharp or dull, aching pain that may come and go or that gets worse over time    · Pain when you touch your chest    · Pain that spreads to your back, abdomen, or down your arm    · Pain that gets worse when you move, breathe deeply, or push or lift an object    · Trouble sleeping or doing your usual activities because of the pain    Seek immediate care if:   · Fever    · The painful areas of your chest look swollen and red, and feel warm to the touch    · Pain prevents you from sleeping    Contact your healthcare provider if:   · You have a fever  · The painful areas of your chest look swollen, red, and feel warm to the touch  · You cannot sleep because of the pain  · You have questions or concerns about your condition or care  Treatment for costochondritis  may not be needed  Costochondritis pain may go away without treatment, usually within a year  Treatment may include any of the following:  · Acetaminophen  decreases pain  Acetaminophen is available without a doctor's order  Ask how much to take and how often to take it  Follow directions  Acetaminophen can cause liver damage if not taken correctly  · NSAIDs , such as ibuprofen, help decrease swelling, pain, and fever  This medicine is available with or without a doctor's order  NSAIDs can cause stomach bleeding or kidney problems in certain people  If you take blood thinner medicine, always ask if NSAIDs are safe for you  Always read the medicine label and follow directions  Do not give these medicines to children under 10months of age without direction from your child's healthcare provider      Manage your symptoms:   · Rest as needed  Avoid painful movements and activities  Do not carry objects, such as a purse or backpack, if this causes pain  Avoid activities such as weightlifting until your pain decreases or goes away  Ask your healthcare provider which activities are best for you to do while you recover  · Apply heat to help decrease pain  Apply heat on the area for 20 to 30 minutes every 2 hours for as many days as directed  · Apply ice to help decrease swelling and pain  Ice may also help prevent tissue damage  Use an ice pack, or put crushed ice in a plastic bag  Cover it with a towel and place it on the painful area for 15 to 20 minutes every hour or as directed  · Do gentle stretching exercises   a doorway and put your hands on the door frame at the level of your ears or shoulders  Take 1 step forward and gently stretch your chest  Try this with your hands higher up on the doorway  Follow up with your healthcare provider as directed:  Write down your questions so you remember to ask them during your visits  © Copyright [employer] Information is for End User's use only and may not be sold, redistributed or otherwise used for commercial purposes  All illustrations and images included in CareNotes® are the copyrighted property of A D A M , Inc  or [address] [place], [place] [address]   The above information is an  only  It is not intended as medical advice for individual conditions or treatments  Talk to your doctor, nurse or pharmacist before following any medical regimen to see if it is safe and effective for you

## 2021-01-15 NOTE — LETTER
January 15, 2021     Felipeerictanja Jimenes, [address] [place] [place] [address]    Patient: Ash Clements   YOB: 1945   Date of Visit: 1/15/2021       Dear Dr Gopal Ramirez: Thank you for referring Herbie Dumont to me for evaluation  Below are my notes for this consultation  If you have questions, please do not hesitate to call me  I look forward to following your patient along with you  Sincerely,        Roc Darden MD        CC: No Recipients  Roc Darden MD  1/15/2021 12:31 PM  Incomplete  Assessment/Plan:    Costochondral chest pain  The patient is a pleasant 79-year-old male with a past medical history significant for oxygen dependent COPD presenting with bilateral subcostal abdominal wall pain in the upper quadrants bilaterally  Patient notes a chronic low-grade discomfort in the upper abdomen bilaterally  He does report acute flares of pain lasting very briefly associated with coughing  He has had no specific testing or investigations into its etiology previously  Today on physical examination the patient is well-nourished well-appearing male  He is in no acute distress  Awake alert oriented  Competent reliable as a historian  Inspection and palpation of the abdominal wall reveals a normal appearing thoracic cage  He has no tenderness to percussion or palpation sent in the upper abdomen bilaterally as well as over the sternum or costal margin  His history and physical examination is most suggestive of an acute on chronic costochondritis  To further investigate his symptoms I have ordered rib films as well as a chest x-ray and thoracic spine imaging  I have room advised him to take Tylenol or ibuprofen on a as needed basis  I look for to seeing him back after the imaging studies at which point will make additional diagnostic and therapeutic treatment recommendations accordingly              Subjective:      Patient ID: Ash Clements is a 67 y o  male  Patient is here today for generalized abdominal pain that has been present 3 months or less  Stated that the pain starts mid abdomen that radiates to his lower back  No nausea, vomiting,chills, but states that sometimes he gets a fever in the evening  Dena Rogers MA          Review of Systems   Constitutional: Negative for chills and fever  HENT: Negative for ear pain and sore throat  Eyes: Negative for pain and visual disturbance  Respiratory: Positive for cough and shortness of breath  Cardiovascular: Negative for chest pain and palpitations  Gastrointestinal: Positive for abdominal distention and abdominal pain  Negative for vomiting  The abdominal distension is exacerbated by his CPAP   Genitourinary: Negative for dysuria and hematuria  Musculoskeletal: Negative for arthralgias and back pain  Skin: Negative for color change and rash  Neurological: Negative for seizures and syncope  All other systems reviewed and are negative  Objective:      /62 (BP Location: Left arm, Patient Position: Sitting, Cuff Size: Standard)   Pulse 68   Temp (!) 97 1 °F (36 2 °C) (Tympanic)   Resp 16   Ht 5' 8" (1 727 m)   Wt 98 4 kg (217 lb)   BMI 32 99 kg/m²          Physical Exam  Vitals signs and nursing note reviewed  Constitutional:       Appearance: He is well-developed  HENT:      Head: Normocephalic and atraumatic  Eyes:      Conjunctiva/sclera: Conjunctivae normal       Pupils: Pupils are equal, round, and reactive to light  Neck:      Musculoskeletal: Normal range of motion and neck supple  Cardiovascular:      Rate and Rhythm: Normal rate and regular rhythm  Pulmonary:      Effort: Pulmonary effort is normal       Breath sounds: Normal breath sounds  Abdominal:      General: Bowel sounds are normal       Palpations: Abdomen is soft  Musculoskeletal: Normal range of motion  Skin:     General: Skin is warm and dry     Neurological:      Mental Status: He is alert and oriented to person, place, and time  Psychiatric:         Behavior: Behavior normal          Thought Content:  Thought content normal          Judgment: Judgment normal

## 2021-01-20 ENCOUNTER — TELEPHONE (OUTPATIENT)
Dept: OTHER | Facility: OTHER | Age: 76
End: 2021-01-20

## 2021-01-22 ENCOUNTER — OFFICE VISIT (OUTPATIENT)
Dept: PULMONOLOGY | Facility: CLINIC | Age: 76
End: 2021-01-22
Payer: COMMERCIAL

## 2021-01-22 VITALS
HEART RATE: 85 BPM | DIASTOLIC BLOOD PRESSURE: 56 MMHG | TEMPERATURE: 96 F | OXYGEN SATURATION: 91 % | BODY MASS INDEX: 32.77 KG/M2 | WEIGHT: 216.2 LBS | SYSTOLIC BLOOD PRESSURE: 87 MMHG | HEIGHT: 68 IN

## 2021-01-22 DIAGNOSIS — G47.30 SLEEP APNEA, UNSPECIFIED TYPE: ICD-10-CM

## 2021-01-22 DIAGNOSIS — J43.1 PANLOBULAR EMPHYSEMA (HCC): Primary | ICD-10-CM

## 2021-01-22 DIAGNOSIS — I25.9 CHRONIC ISCHEMIC HEART DISEASE: ICD-10-CM

## 2021-01-22 PROCEDURE — 99205 OFFICE O/P NEW HI 60 MIN: CPT | Performed by: INTERNAL MEDICINE

## 2021-01-22 NOTE — ASSESSMENT & PLAN NOTE
Unfortunately I have no access to PFTs in [facility] hesitant to get repeat once given the fact that he has passed out from them before  I assume he has severe COPD based on his history and his oxygen requirements  Assess the case he is on appropriate therapy with Symbicort and Spiriva  I have asked him to use a spacer device with the Symbicort 2 puffs twice a day in addition to his Spiriva once a day  He has an updated nebulizer and rescue inhaler  We went through COPD Education packet together and provided approximately 5 minutes of education  I referred him for pulmonary rehab today  I reviewed his most recent CT scan as well as his 2D echocardiogram   He is due for repeat CT scan which I have scheduled

## 2021-01-22 NOTE — ASSESSMENT & PLAN NOTE
Haley Medico appears well compensated with regards to his heart failure which is diastolic in nature  He has normal heart rate and blood pressure on today's exam   He is up-to-date on his flu and pneumonia vaccines and recently received his 1st coronavirus vaccine

## 2021-01-22 NOTE — PROGRESS NOTES
Assessment/Plan:    COPD (chronic obstructive pulmonary disease) ([facility] [address] )  Unfortunately I have no access to PFTs in [facility]'s hesitant to get repeat once given the fact that he has passed out from them before  I assume he has severe COPD based on his history and his oxygen requirements  Assess the case he is on appropriate therapy with Symbicort and Spiriva  I have asked him to use a spacer device with the Symbicort 2 puffs twice a day in addition to his Spiriva once a day  He has an updated nebulizer and rescue inhaler  We went through COPD Education packet together and provided approximately 5 minutes of education  I referred him for pulmonary rehab today  I reviewed his most recent CT scan as well as his 2D echocardiogram   He is due for repeat CT scan which I have scheduled  Sleep apnea  Shlomo's wearing his CPAP all night every night with his oxygen  He is compliant has no daytime sleepiness  Chronic ischemic heart disease  Shlomo appears well compensated with regards to his heart failure which is diastolic in nature  He has normal heart rate and blood pressure on today's exam   He is up-to-date on his flu and pneumonia vaccines and recently received his 1st coronavirus vaccine  Diagnoses and all orders for this visit:    [facility]se[facility] ([facility] )  -     CT chest without contrast; Future  -     Ambulatory Referral to Pulmonary Rehabilitation; Future    Sleep apnea, unspecified type    Chronic ischemic heart disease          Subjective:      Patient ID: Joanne Malloy is a 67 y o  male  Pollie Jay is here to establish care for his COPD and sleep apnea  He is a 2 pack-per-day smoker for 35 years but quit in [address] [place] [address] after having heart attack  He worked as a  an  but 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 and was ensured duty on the [employer]  He currently takes Symbicort twice a day and Spiriva once daily and uses albuterol about once a week    He has been hospitalized approximately 10 times for his breathing  He maintains on Zithromax Monday Wednesday and Friday  He denies any triggers for his breathing other than when he gets an upper respiratory infection  He gets short of breath with stairs and inclines but is mostly able to do his activities of normal living  He wears his oxygen with exertion and at night through his CPAP  He wears CPAP 10 cm of water and I have reviewed his compliance data  He uses 2 to 2 5 L oxygen  primary symptoms  Associated symptoms include chest pain and coughing  Pertinent negatives include no fever, headaches, myalgias or sore throat  The following portions of the patient's history were reviewed and updated as appropriate: allergies, current medications, past family history, past medical history, past social history, past surgical history and problem list     Review of Systems   Constitutional: Negative  Negative for appetite change and fever  HENT: Positive for postnasal drip, rhinorrhea and sneezing  Negative for ear pain, sore throat and trouble swallowing  Eyes: Negative  Respiratory: Positive for cough, shortness of breath and wheezing  Cardiovascular: Positive for chest pain  Gastrointestinal: Negative  Endocrine: Negative  Genitourinary: Negative  Musculoskeletal: Negative for myalgias  Allergic/Immunologic: Negative  Neurological: Negative  Negative for headaches  Hematological: Negative  Psychiatric/Behavioral: Negative  Objective:      BP (!) 87/56   Pulse 85   Temp (!) 96 °F (35 6 °C) (Tympanic)   Ht 5' 8" (1 727 m)   Wt 98 1 kg (216 lb 3 2 oz)   SpO2 91%   BMI 32 87 kg/m²          Physical Exam  Constitutional:       Appearance: He is well-developed  HENT:      Head: Normocephalic  Eyes:      Pupils: Pupils are equal, round, and reactive to light  Neck:      Musculoskeletal: Neck supple  Cardiovascular:      Rate and Rhythm: Normal rate     Pulmonary:      Effort: Pulmonary effort is normal  No respiratory distress  Breath sounds: No wheezing or rales  Abdominal:      Palpations: Abdomen is soft  Musculoskeletal: Normal range of motion  Skin:     General: Skin is warm and dry  Neurological:      Mental Status: He is alert and oriented to person, place, and time           Answers for HPI/ROS submitted by the patient on 1/15/2021   Primary symptoms  Do you have chest tightness?: Yes  Do you have difficulty breathing?: Yes  Chronicity: chronic  When did you first notice your symptoms?: more than 1 year ago  How often do your symptoms occur?: constantly  Since you first noticed this problem, how has it changed?: gradually worsening  Do you have shortness of breath that occurs with effort or exertion?: Yes  Do you have ear congestion?: No  Do you have heartburn?: No  Do you have fatigue?: Yes  Do you have nasal congestion?: No  Do you have shortness of breath when lying flat?: Yes  Do you have shortness of breath when you wake up?: No  Do you have sweats?: No  Have you experienced weight loss?: No  Which of the following makes your symptoms worse?: climbing stairs, exercise, exposure to smoke, minimal activity, strenuous activity, URI  Which of the following makes your symptoms better?: OTC cough suppressant, rest, steroid inhaler  Risk factors for lung disease: smoking/tobacco exposure

## 2021-01-22 NOTE — ASSESSMENT & PLAN NOTE
Shlomo's wearing his CPAP all night every night with his oxygen  He is compliant has no daytime sleepiness

## 2021-01-25 ENCOUNTER — TELEPHONE (OUTPATIENT)
Dept: OTHER | Facility: OTHER | Age: 76
End: 2021-01-25

## 2021-01-25 ENCOUNTER — OFFICE VISIT (OUTPATIENT)
Dept: SURGERY | Facility: CLINIC | Age: 76
End: 2021-01-25
Payer: COMMERCIAL

## 2021-01-25 VITALS — TEMPERATURE: 97.5 F | HEIGHT: 68 IN | RESPIRATION RATE: 18 BRPM | HEART RATE: 86 BPM | BODY MASS INDEX: 32.87 KG/M2

## 2021-01-25 DIAGNOSIS — R07.89 COSTOCHONDRAL CHEST PAIN: Primary | ICD-10-CM

## 2021-01-25 PROCEDURE — 1036F TOBACCO NON-USER: CPT | Performed by: SURGERY

## 2021-01-25 PROCEDURE — 1160F RVW MEDS BY RX/DR IN RCRD: CPT | Performed by: SURGERY

## 2021-01-25 PROCEDURE — 99213 OFFICE O/P EST LOW 20 MIN: CPT | Performed by: SURGERY

## 2021-01-25 NOTE — ASSESSMENT & PLAN NOTE
Patient returns for routine scheduled follow-up after his x-rays to discuss the results and reassess his symptom complex  The patient reports that his symptoms are stable in frequency and severity  On physical examination the patient is well appearing  He is accompanied by his wife  His chest wall was again inspected  There is no evidence for shingles  Mildly tender to palpation over the mid thorax right mid axillary line  Results of the thoracic spine and chest x-ray and rib x-rays were reviewed  Copies of the report provided to the patient for his record  Patient's symptom complex appears most consistent with an acute costochondritis  I have recommended acetaminophen and ibuprofen to be taken on an as-needed basis  I have encouraged him to remain hydrated well taking these medications  All questions were answered to their satisfaction  I look for to seeing him back in 2 months time to reassess his symptom complex

## 2021-01-25 NOTE — LETTER
January 25, 2021     Reshma Hernandez, [address] [place] [address]    Patient: Chloe Lawrence   YOB: 1945   Date of Visit: 1/25/2021       Dear Dr Scott Roberto: Thank you for referring Lucas Winston to me for evaluation  Below are my notes for this consultation  If you have questions, please do not hesitate to call me  I look forward to following your patient along with you  Sincerely,        Alpha Gaucher, MD        CC: No Recipients  Alpha Gaucher, MD  1/25/2021 12:53 PM  Incomplete  Assessment/Plan:    Costochondral chest pain  Patient returns for routine scheduled follow-up after his x-rays to discuss the results and reassess his symptom complex  The patient reports that his symptoms are stable in frequency and severity  On physical examination the patient is well appearing  He is accompanied by his wife  His chest wall was again inspected  There is no evidence for shingles  Mildly tender to palpation over the mid thorax right mid axillary line  Results of the thoracic spine and chest x-ray and rib x-rays were reviewed  Copies of the report provided to the patient for his record  Patient's symptom complex appears most consistent with an acute costochondritis  I have recommended acetaminophen and ibuprofen to be taken on an as-needed basis  I have encouraged him to remain hydrated well taking these medications  All questions were answered to their satisfaction  I look for to seeing him back in 2 months time to reassess his symptom complex  Subjective:      Patient ID: Chloe Lawrence is a 67 y o  male  Patient is here today in follow up to his x-rays  Stated that he is still having lower back pain that radiates into his right lower abdomen  Bending over,sitting,coughing causes discomfort  Patient is scheduled for a Ct scan of his lungs on 01-  No fever or chills   [address]          Review of Systems Constitutional: Negative for chills and fever  HENT: Negative for ear pain and sore throat  Eyes: Negative for pain and visual disturbance  Respiratory: Negative for cough and shortness of breath  Cardiovascular: Negative for chest pain and palpitations  Gastrointestinal: Negative for abdominal pain and vomiting  Genitourinary: Negative for dysuria and hematuria  Musculoskeletal: Negative for arthralgias and back pain  Skin: Negative for color change and rash  Neurological: Negative for seizures and syncope  All other systems reviewed and are negative  Objective:      Pulse 86   Temp 97 5 °F (36 4 °C) (Tympanic)   Resp 18   Ht 5' 8" (1 727 m)   BMI 32 87 kg/m²          Physical Exam  Vitals signs and nursing note reviewed  Constitutional:       Appearance: He is well-developed  HENT:      Head: Normocephalic and atraumatic  Eyes:      Conjunctiva/sclera: Conjunctivae normal       Pupils: Pupils are equal, round, and reactive to light  Neck:      Musculoskeletal: Normal range of motion and neck supple  Cardiovascular:      Rate and Rhythm: Normal rate and regular rhythm  Pulmonary:      Effort: Pulmonary effort is normal       Breath sounds: Normal breath sounds  Abdominal:      General: Bowel sounds are normal       Palpations: Abdomen is soft  Musculoskeletal: Normal range of motion  Skin:     General: Skin is warm and dry  Neurological:      Mental Status: He is alert and oriented to person, place, and time  Psychiatric:         Behavior: Behavior normal          Thought Content:  Thought content normal          Judgment: Judgment normal

## 2021-01-25 NOTE — PROGRESS NOTES
Assessment/Plan:    Costochondral chest pain  Patient returns for routine scheduled follow-up after his x-rays to discuss the results and reassess his symptom complex  The patient reports that his symptoms are stable in frequency and severity  On physical examination the patient is well appearing  He is accompanied by his wife  His chest wall was again inspected  There is no evidence for shingles  Mildly tender to palpation over the mid thorax right mid axillary line  Results of the thoracic spine and chest x-ray and rib x-rays were reviewed  Copies of the report provided to the patient for his record  Patient's symptom complex appears most consistent with an acute costochondritis  I have recommended acetaminophen and ibuprofen to be taken on an as-needed basis  I have encouraged him to remain hydrated well taking these medications  All questions were answered to their satisfaction  I look for to seeing him back in 2 months time to reassess his symptom complex  Subjective:      Patient ID: Catalina Daniels is a 67 y o  male  Patient is here today in follow up to his x-rays  Stated that he is still having lower back pain that radiates into his right lower abdomen  Bending over,sitting,coughing causes discomfort  Patient is scheduled for a Ct scan of his lungs on 01-  No fever or chills  Dena Rogers MA          Review of Systems   Constitutional: Negative for chills and fever  HENT: Negative for ear pain and sore throat  Eyes: Negative for pain and visual disturbance  Respiratory: Negative for cough and shortness of breath  Cardiovascular: Negative for chest pain and palpitations  Gastrointestinal: Negative for abdominal pain and vomiting  Genitourinary: Negative for dysuria and hematuria  Musculoskeletal: Negative for arthralgias and back pain  Skin: Negative for color change and rash  Neurological: Negative for seizures and syncope     All other systems reviewed and are negative  Objective:      Pulse 86   Temp 97 5 °F (36 4 °C) (Tympanic)   Resp 18   Ht 5' 8" (1 727 m)   BMI 32 87 kg/m²          Physical Exam  Vitals signs and nursing note reviewed  Constitutional:       Appearance: He is well-developed  HENT:      Head: Normocephalic and atraumatic  Eyes:      Conjunctiva/sclera: Conjunctivae normal       Pupils: Pupils are equal, round, and reactive to light  Neck:      Musculoskeletal: Normal range of motion and neck supple  Cardiovascular:      Rate and Rhythm: Normal rate and regular rhythm  Pulmonary:      Effort: Pulmonary effort is normal       Breath sounds: Normal breath sounds  Abdominal:      General: Bowel sounds are normal       Palpations: Abdomen is soft  Musculoskeletal: Normal range of motion  Skin:     General: Skin is warm and dry  Neurological:      Mental Status: He is alert and oriented to person, place, and time  Psychiatric:         Behavior: Behavior normal          Thought Content:  Thought content normal          Judgment: Judgment normal

## 2021-01-26 ENCOUNTER — TELEPHONE (OUTPATIENT)
Dept: PULMONOLOGY | Facility: CLINIC | Age: 76
End: 2021-01-26

## 2021-01-26 NOTE — TELEPHONE ENCOUNTER
Patient calling stating he needs a prior auth for his CT on 2/1  He had two other appts for a CT before and both got cancelled due to not having a prior auth  It looks like from the notes we were sent an email regarding the auths but wasn't notified any other way   Please advise

## 2021-01-27 NOTE — TELEPHONE ENCOUNTER
After speaking with the [facility]'s health plan, I called Mr Bhargavi Nagel and left a voice mail  His chest CT has been approved  I have updated the referral in Epic with the authorization information  I told the patient to call outr office, if he has any questions

## 2021-02-04 ENCOUNTER — OFFICE VISIT (OUTPATIENT)
Dept: FAMILY MEDICINE CLINIC | Facility: CLINIC | Age: 76
End: 2021-02-04
Payer: COMMERCIAL

## 2021-02-04 VITALS
RESPIRATION RATE: 20 BRPM | HEART RATE: 73 BPM | TEMPERATURE: 97.9 F | DIASTOLIC BLOOD PRESSURE: 58 MMHG | OXYGEN SATURATION: 98 % | SYSTOLIC BLOOD PRESSURE: 124 MMHG | WEIGHT: 216 LBS | BODY MASS INDEX: 32.74 KG/M2 | HEIGHT: 68 IN

## 2021-02-04 DIAGNOSIS — J44.9 CHRONIC OBSTRUCTIVE PULMONARY DISEASE, UNSPECIFIED COPD TYPE (HCC): ICD-10-CM

## 2021-02-04 DIAGNOSIS — I10 ESSENTIAL HYPERTENSION: ICD-10-CM

## 2021-02-04 DIAGNOSIS — E04.1 THYROID NODULE: ICD-10-CM

## 2021-02-04 DIAGNOSIS — K21.9 GASTROESOPHAGEAL REFLUX DISEASE, UNSPECIFIED WHETHER ESOPHAGITIS PRESENT: Primary | ICD-10-CM

## 2021-02-04 DIAGNOSIS — E78.5 HYPERLIPIDEMIA, UNSPECIFIED HYPERLIPIDEMIA TYPE: ICD-10-CM

## 2021-02-04 PROCEDURE — 99213 OFFICE O/P EST LOW 20 MIN: CPT | Performed by: FAMILY MEDICINE

## 2021-02-04 PROCEDURE — 3288F FALL RISK ASSESSMENT DOCD: CPT | Performed by: FAMILY MEDICINE

## 2021-02-04 PROCEDURE — 1101F PT FALLS ASSESS-DOCD LE1/YR: CPT | Performed by: FAMILY MEDICINE

## 2021-02-04 RX ORDER — LOSARTAN POTASSIUM 25 MG/1
25 TABLET ORAL DAILY
Status: CANCELLED | OUTPATIENT
Start: 2021-02-04

## 2021-02-04 RX ORDER — AZITHROMYCIN 250 MG/1
TABLET, FILM COATED ORAL
Qty: 45 TABLET | Refills: 1 | Status: SHIPPED | OUTPATIENT
Start: 2021-02-04 | End: 2021-02-18 | Stop reason: SDUPTHER

## 2021-02-04 RX ORDER — FAMOTIDINE 20 MG/1
20 TABLET, FILM COATED ORAL DAILY
Start: 2021-02-04 | End: 2021-02-18 | Stop reason: SDUPTHER

## 2021-02-04 RX ORDER — LOSARTAN POTASSIUM 25 MG/1
25 TABLET ORAL DAILY
Qty: 90 TABLET | Refills: 1 | Status: SHIPPED | OUTPATIENT
Start: 2021-02-04 | End: 2021-02-18 | Stop reason: SDUPTHER

## 2021-02-04 RX ORDER — ROSUVASTATIN CALCIUM 40 MG/1
40 TABLET, COATED ORAL DAILY
Qty: 100 TABLET | Refills: 1 | Status: SHIPPED | OUTPATIENT
Start: 2021-02-04 | End: 2021-02-18 | Stop reason: SDUPTHER

## 2021-02-04 NOTE — PROGRESS NOTES
Assessment/Plan:       Problem List Items Addressed This Visit        Digestive    Gastroesophageal reflux disease - Primary    Relevant Medications    famotidine (PEPCID) 20 mg tablet       Endocrine    Thyroid nodule    Relevant Medications    metoprolol succinate (KAPSPARGO SPRINKLE) 25 MG extended-release capsule    Other Relevant Orders    US thyroid       Respiratory    COPD (chronic obstructive pulmonary disease) (HCC)    Relevant Medications    azithromycin (ZITHROMAX) 250 mg tablet       Cardiovascular and Mediastinum    Hypertension    Relevant Medications    losartan (COZAAR) 25 mg tablet    metoprolol succinate (KAPSPARGO SPRINKLE) 25 MG extended-release capsule       Other    Hyperlipidemia    Relevant Medications    rosuvastatin (CRESTOR) 40 MG tablet            Subjective:      Patient ID: Kerwin Lynn is a 67 y o  male  HPI     The patient presents today for follow-up  Hypertension- He does have some periods where he will feel a bit lightheaded, recent readings 95/57, 85/57, 119/57, 107/68  Concern for hypotension with current regimen  He is currently taking Lasix 40 mg daily with potassium 10 mEq (advised this should by 20 mEq per cardiology recommendation he will increase), losartan 25 mg daily, and metoprolol succinate 25 mg BID (he is unsure if he is supposed to be taking metoprolol daily or twice daily)  Will decrease metoprolol to once daily and monitor very closely  Will also touch base with cardiology  Swelling much better being on the Lasix daily, does return if he is without Lasix  EKG today- NSR, no acute changes as below  Hypoxia and COPD- Dropping down to 80s, with ambulation 78-84%, does recover to higher 80s with rest  He does have portable oxygen but only uses at night with CPAP  Worsening over past year, occurring for 5 years  He follows with Dr Robinson Bates 4/20  Advised to use supplemental oxygen to maintain oxygen saturation above 88%   He follows with his pulmonologist very closely, with plan for CT chest      Continued pain below the ribs and along to the back as discussed last visit  Taking Tylenol without relief, mostly with lying down flat  He is following with general surgery for this and thought to be acute costochondritis  Has follow-up scheduled  The following portions of the patient's history were reviewed and updated as appropriate: allergies, current medications, past family history, past medical history, past social history, past surgical history and problem list     Review of Systems   Constitutional: Negative for chills and fever  Respiratory: Positive for shortness of breath (gradually worsening over past month)  Cardiovascular: Negative for chest pain, palpitations and leg swelling  Objective:      /58   Pulse 73   Temp 97 9 °F (36 6 °C)   Resp 20   Ht 5' 8" (1 727 m)   Wt 98 kg (216 lb)   SpO2 98%   BMI 32 84 kg/m²          Physical Exam  Vitals signs reviewed  Constitutional:       General: He is not in acute distress  Appearance: Normal appearance  He is not ill-appearing, toxic-appearing or diaphoretic  HENT:      Head: Normocephalic and atraumatic  Mouth/Throat:      Comments: Mask in place  Eyes:      General:         Right eye: No discharge  Left eye: No discharge  Extraocular Movements: Extraocular movements intact  Conjunctiva/sclera: Conjunctivae normal    Neck:      Musculoskeletal: Normal range of motion and neck supple  No neck rigidity  Cardiovascular:      Rate and Rhythm: Normal rate and regular rhythm  Heart sounds: Normal heart sounds  No murmur  No friction rub  No gallop  Comments: Majority regular beats with a couple pauses with inspiration, obtained EKG which was NSR no acute changes  Pulmonary:      Effort: Pulmonary effort is normal  No respiratory distress  Breath sounds: Normal breath sounds  No stridor  No wheezing or rhonchi        Comments: Diminished at bases bilaterally  Skin:     General: Skin is warm  Neurological:      Mental Status: He is alert and oriented to person, place, and time     Psychiatric:         Mood and Affect: Mood normal          Behavior: Behavior normal            DO Kaleb George [address] [place] [address]

## 2021-02-05 ENCOUNTER — HOSPITAL ENCOUNTER (OUTPATIENT)
Dept: CT IMAGING | Facility: HOSPITAL | Age: 76
Discharge: HOME | End: 2021-02-05
Payer: COMMERCIAL

## 2021-02-05 DIAGNOSIS — J43.1 PANLOBULAR EMPHYSEMA (HCC): ICD-10-CM

## 2021-02-05 PROCEDURE — 71250 CT THORAX DX C-: CPT

## 2021-02-05 PROCEDURE — G1004 CDSM NDSC: HCPCS

## 2021-02-15 DIAGNOSIS — K21.9 GASTROESOPHAGEAL REFLUX DISEASE, UNSPECIFIED WHETHER ESOPHAGITIS PRESENT: ICD-10-CM

## 2021-02-15 DIAGNOSIS — E78.5 HYPERLIPIDEMIA, UNSPECIFIED HYPERLIPIDEMIA TYPE: ICD-10-CM

## 2021-02-15 DIAGNOSIS — I10 ESSENTIAL HYPERTENSION: ICD-10-CM

## 2021-02-15 DIAGNOSIS — J44.9 CHRONIC OBSTRUCTIVE PULMONARY DISEASE, UNSPECIFIED COPD TYPE (HCC): ICD-10-CM

## 2021-02-16 NOTE — TELEPHONE ENCOUNTER
Please ask the patient how his blood pressure has been with decreasing the metoprolol dose  If still getting low readings, we can discontinue his losartan (I discussed with his cardiologist Dr Michael Bangura ) Thanks!

## 2021-02-16 NOTE — TELEPHONE ENCOUNTER
After speaking with you, I called pt and informed him to call his pulmonologist  He states he has an appt next week with them  I advised him it is better to call today  He verbalized an understanding

## 2021-02-16 NOTE — TELEPHONE ENCOUNTER
Called and spoke with pt  He states his bp has been better since decreasing metoprolol  His readings from 2-9 to today are as follows:   131/66  126/69  109/61  118/65  121/66 (while on phone)    Pt also stated his medications were to be sent to the [facility] order and they were sent to [facility]  He also reports when he takes his pulse ox it reads low on oxygen  He reports no SOB, but while we were on the phone he took a reading  He reports oxygen was 81 and BPM was 96

## 2021-02-17 NOTE — TELEPHONE ENCOUNTER
Late entry-     Reviewed, thank you  As per our last visit, hypoxia with COPD is not new and he does carry portable oxygen  He follows closely with pulmonology  Blood pressures look good, any further lightheadedness? Also please find out which specific medications he needs sent to [facility] order so I can send them all in one batch

## 2021-02-18 NOTE — TELEPHONE ENCOUNTER
Patient states there has been no other instances of lightheadedness  He had questioned why his metoprolol was sent over as the "sprinkle on food tab"  He states he has been taking the regular tablet forever  Please advise  Once that is decided he will need that along with the rest of these medications filled and sent to [facility]

## 2021-02-19 RX ORDER — AZITHROMYCIN 250 MG/1
TABLET, FILM COATED ORAL
Qty: 45 TABLET | Refills: 1 | Status: SHIPPED | OUTPATIENT
Start: 2021-02-19 | End: 2021-06-08 | Stop reason: SDUPTHER

## 2021-02-19 RX ORDER — METOPROLOL SUCCINATE 25 MG/1
25 TABLET, EXTENDED RELEASE ORAL DAILY
Qty: 100 TABLET | Refills: 1 | Status: SHIPPED | OUTPATIENT
Start: 2021-02-19 | End: 2021-06-08 | Stop reason: SDUPTHER

## 2021-02-19 RX ORDER — FAMOTIDINE 20 MG/1
20 TABLET, FILM COATED ORAL DAILY
Qty: 100 TABLET | Refills: 1 | Status: SHIPPED | OUTPATIENT
Start: 2021-02-19 | End: 2021-04-21 | Stop reason: SDUPTHER

## 2021-02-19 RX ORDER — ROSUVASTATIN CALCIUM 40 MG/1
40 TABLET, COATED ORAL DAILY
Qty: 100 TABLET | Refills: 1 | Status: SHIPPED | OUTPATIENT
Start: 2021-02-19 | End: 2021-06-08 | Stop reason: SDUPTHER

## 2021-02-19 RX ORDER — LOSARTAN POTASSIUM 25 MG/1
25 TABLET ORAL DAILY
Qty: 100 TABLET | Refills: 1 | Status: SHIPPED | OUTPATIENT
Start: 2021-02-19 | End: 2021-06-08 | Stop reason: SDUPTHER

## 2021-02-19 NOTE — TELEPHONE ENCOUNTER
I fixed the metoprolol prescription back to capsule, and I sent in all medication refills to [facility] mail order  Let me know if he needs anything else  Thanks!

## 2021-02-21 ENCOUNTER — APPOINTMENT (EMERGENCY)
Dept: RADIOLOGY | Facility: HOSPITAL | Age: 76
End: 2021-02-21
Payer: COMMERCIAL

## 2021-02-21 ENCOUNTER — HOSPITAL ENCOUNTER (OUTPATIENT)
Facility: HOSPITAL | Age: 76
Setting detail: OBSERVATION
Discharge: HOME | End: 2021-02-22
Attending: EMERGENCY MEDICINE | Admitting: INTERNAL MEDICINE
Payer: COMMERCIAL

## 2021-02-21 DIAGNOSIS — M54.16 LUMBAR RADICULOPATHY: ICD-10-CM

## 2021-02-21 DIAGNOSIS — R07.9 CHEST PAIN: Primary | ICD-10-CM

## 2021-02-21 PROBLEM — I50.32 CHRONIC DIASTOLIC (CONGESTIVE) HEART FAILURE (HCC): Status: ACTIVE | Noted: 2021-01-11

## 2021-02-21 LAB
ALBUMIN SERPL BCP-MCNC: 3.9 G/DL (ref 3.5–5.7)
ALP SERPL-CCNC: 70 U/L (ref 55–165)
ALT SERPL W P-5'-P-CCNC: 20 U/L (ref 7–52)
ANION GAP SERPL CALCULATED.3IONS-SCNC: 8 MMOL/L (ref 4–13)
APTT PPP: 26 SECONDS (ref 23–37)
AST SERPL W P-5'-P-CCNC: 20 U/L (ref 13–39)
ATRIAL RATE: 67 BPM
ATRIAL RATE: 68 BPM
BASOPHILS # BLD AUTO: 0.1 THOUSANDS/ΜL (ref 0–0.1)
BASOPHILS NFR BLD AUTO: 1 % (ref 0–2)
BILIRUB SERPL-MCNC: 0.6 MG/DL (ref 0.2–1)
BNP SERPL-MCNC: 35 PG/ML (ref 1–100)
BUN SERPL-MCNC: 18 MG/DL (ref 7–25)
CALCIUM SERPL-MCNC: 8.6 MG/DL (ref 8.6–10.5)
CHLORIDE SERPL-SCNC: 99 MMOL/L (ref 98–107)
CO2 SERPL-SCNC: 34 MMOL/L (ref 21–31)
CREAT SERPL-MCNC: 1.13 MG/DL (ref 0.7–1.3)
D DIMER PPP FEU-MCNC: 0.58 UG/ML FEU
EOSINOPHIL # BLD AUTO: 0.6 THOUSAND/ΜL (ref 0–0.61)
EOSINOPHIL NFR BLD AUTO: 5 % (ref 0–5)
ERYTHROCYTE [DISTWIDTH] IN BLOOD BY AUTOMATED COUNT: 13.9 % (ref 11.5–14.5)
FLUAV RNA RESP QL NAA+PROBE: NEGATIVE
FLUBV RNA RESP QL NAA+PROBE: NEGATIVE
GFR SERPL CREATININE-BSD FRML MDRD: 63 ML/MIN/1.73SQ M
GLUCOSE SERPL-MCNC: 96 MG/DL (ref 65–99)
HCT VFR BLD AUTO: 46 % (ref 42–47)
HGB BLD-MCNC: 15.3 G/DL (ref 14–18)
INR PPP: 1 (ref 0.84–1.19)
LYMPHOCYTES # BLD AUTO: 1.4 THOUSANDS/ΜL (ref 0.6–4.47)
LYMPHOCYTES NFR BLD AUTO: 13 % (ref 21–51)
MCH RBC QN AUTO: 32.2 PG (ref 26–34)
MCHC RBC AUTO-ENTMCNC: 33.2 G/DL (ref 31–37)
MCV RBC AUTO: 97 FL (ref 81–99)
MONOCYTES # BLD AUTO: 1.4 THOUSAND/ΜL (ref 0.17–1.22)
MONOCYTES NFR BLD AUTO: 14 % (ref 2–12)
NEUTROPHILS # BLD AUTO: 7.1 THOUSANDS/ΜL (ref 1.4–6.5)
NEUTS SEG NFR BLD AUTO: 68 % (ref 42–75)
P AXIS: 69 DEGREES
P AXIS: 77 DEGREES
PLATELET # BLD AUTO: 178 THOUSANDS/UL (ref 149–390)
PMV BLD AUTO: 8.5 FL (ref 8.6–11.7)
POTASSIUM SERPL-SCNC: 4 MMOL/L (ref 3.5–5.5)
PR INTERVAL: 168 MS
PR INTERVAL: 174 MS
PROT SERPL-MCNC: 6.2 G/DL (ref 6.4–8.9)
PROTHROMBIN TIME: 13.1 SECONDS (ref 11.6–14.5)
QRS AXIS: 66 DEGREES
QRS AXIS: 68 DEGREES
QRSD INTERVAL: 92 MS
QRSD INTERVAL: 94 MS
QT INTERVAL: 422 MS
QT INTERVAL: 424 MS
QTC INTERVAL: 448 MS
QTC INTERVAL: 448 MS
RBC # BLD AUTO: 4.76 MILLION/UL (ref 4.3–5.9)
RSV RNA RESP QL NAA+PROBE: NEGATIVE
SARS-COV-2 RNA RESP QL NAA+PROBE: NEGATIVE
SODIUM SERPL-SCNC: 141 MMOL/L (ref 134–143)
T WAVE AXIS: 40 DEGREES
T WAVE AXIS: 47 DEGREES
TROPONIN I SERPL-MCNC: <0.03 NG/ML
VENTRICULAR RATE: 67 BPM
VENTRICULAR RATE: 68 BPM
WBC # BLD AUTO: 10.6 THOUSAND/UL (ref 4.8–10.8)

## 2021-02-21 PROCEDURE — 71045 X-RAY EXAM CHEST 1 VIEW: CPT

## 2021-02-21 PROCEDURE — 83880 ASSAY OF NATRIURETIC PEPTIDE: CPT | Performed by: EMERGENCY MEDICINE

## 2021-02-21 PROCEDURE — 80053 COMPREHEN METABOLIC PANEL: CPT | Performed by: EMERGENCY MEDICINE

## 2021-02-21 PROCEDURE — 99215 OFFICE O/P EST HI 40 MIN: CPT | Performed by: NURSE PRACTITIONER

## 2021-02-21 PROCEDURE — 99285 EMERGENCY DEPT VISIT HI MDM: CPT | Performed by: EMERGENCY MEDICINE

## 2021-02-21 PROCEDURE — 0241U HB NFCT DS VIR RESP RNA 4 TRGT: CPT | Performed by: EMERGENCY MEDICINE

## 2021-02-21 PROCEDURE — 84484 ASSAY OF TROPONIN QUANT: CPT | Performed by: INTERNAL MEDICINE

## 2021-02-21 PROCEDURE — 93005 ELECTROCARDIOGRAM TRACING: CPT

## 2021-02-21 PROCEDURE — 85025 COMPLETE CBC W/AUTO DIFF WBC: CPT | Performed by: EMERGENCY MEDICINE

## 2021-02-21 PROCEDURE — 93010 ELECTROCARDIOGRAM REPORT: CPT | Performed by: INTERNAL MEDICINE

## 2021-02-21 PROCEDURE — 99220 PR INITIAL OBSERVATION CARE/DAY 70 MINUTES: CPT | Performed by: INTERNAL MEDICINE

## 2021-02-21 PROCEDURE — 85610 PROTHROMBIN TIME: CPT | Performed by: EMERGENCY MEDICINE

## 2021-02-21 PROCEDURE — 85730 THROMBOPLASTIN TIME PARTIAL: CPT | Performed by: EMERGENCY MEDICINE

## 2021-02-21 PROCEDURE — 99285 EMERGENCY DEPT VISIT HI MDM: CPT

## 2021-02-21 PROCEDURE — 94664 DEMO&/EVAL PT USE INHALER: CPT

## 2021-02-21 PROCEDURE — 84484 ASSAY OF TROPONIN QUANT: CPT | Performed by: EMERGENCY MEDICINE

## 2021-02-21 PROCEDURE — 36415 COLL VENOUS BLD VENIPUNCTURE: CPT | Performed by: EMERGENCY MEDICINE

## 2021-02-21 PROCEDURE — 94760 N-INVAS EAR/PLS OXIMETRY 1: CPT

## 2021-02-21 PROCEDURE — 85379 FIBRIN DEGRADATION QUANT: CPT | Performed by: EMERGENCY MEDICINE

## 2021-02-21 RX ORDER — BUDESONIDE AND FORMOTEROL FUMARATE DIHYDRATE 160; 4.5 UG/1; UG/1
2 AEROSOL RESPIRATORY (INHALATION) 2 TIMES DAILY
Status: DISCONTINUED | OUTPATIENT
Start: 2021-02-21 | End: 2021-02-22 | Stop reason: HOSPADM

## 2021-02-21 RX ORDER — POTASSIUM CHLORIDE 20 MEQ/1
20 TABLET, EXTENDED RELEASE ORAL DAILY
Status: DISCONTINUED | OUTPATIENT
Start: 2021-02-21 | End: 2021-02-22 | Stop reason: HOSPADM

## 2021-02-21 RX ORDER — ALBUTEROL SULFATE 90 UG/1
2 AEROSOL, METERED RESPIRATORY (INHALATION) EVERY 4 HOURS PRN
Status: DISCONTINUED | OUTPATIENT
Start: 2021-02-21 | End: 2021-02-22 | Stop reason: HOSPADM

## 2021-02-21 RX ORDER — ONDANSETRON 2 MG/ML
4 INJECTION INTRAMUSCULAR; INTRAVENOUS EVERY 6 HOURS PRN
Status: DISCONTINUED | OUTPATIENT
Start: 2021-02-21 | End: 2021-02-22 | Stop reason: HOSPADM

## 2021-02-21 RX ORDER — HEPARIN SODIUM 5000 [USP'U]/ML
5000 INJECTION, SOLUTION INTRAVENOUS; SUBCUTANEOUS EVERY 8 HOURS SCHEDULED
Status: DISCONTINUED | OUTPATIENT
Start: 2021-02-21 | End: 2021-02-22 | Stop reason: HOSPADM

## 2021-02-21 RX ORDER — METOPROLOL SUCCINATE 25 MG/1
25 TABLET, EXTENDED RELEASE ORAL DAILY
Status: DISCONTINUED | OUTPATIENT
Start: 2021-02-21 | End: 2021-02-22 | Stop reason: HOSPADM

## 2021-02-21 RX ORDER — LOSARTAN POTASSIUM 25 MG/1
25 TABLET ORAL DAILY
Status: DISCONTINUED | OUTPATIENT
Start: 2021-02-21 | End: 2021-02-22 | Stop reason: HOSPADM

## 2021-02-21 RX ORDER — MULTIVIT WITH MINERALS/LUTEIN
1000 TABLET ORAL DAILY
COMMUNITY

## 2021-02-21 RX ORDER — AZITHROMYCIN 250 MG/1
250 TABLET, FILM COATED ORAL 3 TIMES WEEKLY
Status: DISCONTINUED | OUTPATIENT
Start: 2021-02-22 | End: 2021-02-22 | Stop reason: HOSPADM

## 2021-02-21 RX ORDER — TIZANIDINE 2 MG/1
4 TABLET ORAL 3 TIMES DAILY
Status: DISCONTINUED | OUTPATIENT
Start: 2021-02-21 | End: 2021-02-22 | Stop reason: HOSPADM

## 2021-02-21 RX ORDER — MULTIVITAMIN
1 TABLET ORAL DAILY
COMMUNITY

## 2021-02-21 RX ORDER — POTASSIUM CHLORIDE 750 MG/1
20 TABLET, FILM COATED, EXTENDED RELEASE ORAL DAILY
COMMUNITY
End: 2021-06-08 | Stop reason: SDUPTHER

## 2021-02-21 RX ORDER — ATORVASTATIN CALCIUM 40 MG/1
40 TABLET, FILM COATED ORAL
Status: DISCONTINUED | OUTPATIENT
Start: 2021-02-21 | End: 2021-02-22 | Stop reason: HOSPADM

## 2021-02-21 RX ORDER — ACETAMINOPHEN 325 MG/1
650 TABLET ORAL EVERY 6 HOURS PRN
Status: DISCONTINUED | OUTPATIENT
Start: 2021-02-21 | End: 2021-02-22 | Stop reason: HOSPADM

## 2021-02-21 RX ORDER — FAMOTIDINE 20 MG/1
20 TABLET, FILM COATED ORAL DAILY
Status: DISCONTINUED | OUTPATIENT
Start: 2021-02-21 | End: 2021-02-22 | Stop reason: HOSPADM

## 2021-02-21 RX ORDER — ASPIRIN 81 MG/1
81 TABLET, CHEWABLE ORAL DAILY
Status: DISCONTINUED | OUTPATIENT
Start: 2021-02-21 | End: 2021-02-22 | Stop reason: HOSPADM

## 2021-02-21 RX ORDER — DOCUSATE SODIUM 100 MG/1
100 CAPSULE, LIQUID FILLED ORAL 2 TIMES DAILY
Status: DISCONTINUED | OUTPATIENT
Start: 2021-02-21 | End: 2021-02-22 | Stop reason: HOSPADM

## 2021-02-21 RX ORDER — OXYCODONE HYDROCHLORIDE 5 MG/1
5 TABLET ORAL EVERY 4 HOURS PRN
Status: DISCONTINUED | OUTPATIENT
Start: 2021-02-21 | End: 2021-02-22 | Stop reason: HOSPADM

## 2021-02-21 RX ORDER — NITROGLYCERIN 0.4 MG/1
0.4 TABLET SUBLINGUAL
Status: DISCONTINUED | OUTPATIENT
Start: 2021-02-21 | End: 2021-02-22 | Stop reason: HOSPADM

## 2021-02-21 RX ORDER — FUROSEMIDE 40 MG/1
40 TABLET ORAL DAILY
Status: DISCONTINUED | OUTPATIENT
Start: 2021-02-21 | End: 2021-02-22 | Stop reason: HOSPADM

## 2021-02-21 RX ORDER — FUROSEMIDE 40 MG/1
40 TABLET ORAL DAILY
COMMUNITY
End: 2021-06-08 | Stop reason: SDUPTHER

## 2021-02-21 RX ADMIN — FUROSEMIDE 40 MG: 40 TABLET ORAL at 11:17

## 2021-02-21 RX ADMIN — ASPIRIN 81 MG: 81 TABLET, CHEWABLE ORAL at 11:18

## 2021-02-21 RX ADMIN — BUDESONIDE AND FORMOTEROL FUMARATE DIHYDRATE 2 PUFF: 160; 4.5 AEROSOL RESPIRATORY (INHALATION) at 17:01

## 2021-02-21 RX ADMIN — TIZANIDINE 4 MG: 2 TABLET ORAL at 16:51

## 2021-02-21 RX ADMIN — METOPROLOL SUCCINATE 25 MG: 25 TABLET, EXTENDED RELEASE ORAL at 11:17

## 2021-02-21 RX ADMIN — FAMOTIDINE 20 MG: 20 TABLET ORAL at 11:17

## 2021-02-21 RX ADMIN — HEPARIN SODIUM 5000 UNITS: 5000 INJECTION INTRAVENOUS; SUBCUTANEOUS at 14:26

## 2021-02-21 RX ADMIN — POTASSIUM CHLORIDE 20 MEQ: 1500 TABLET, EXTENDED RELEASE ORAL at 11:17

## 2021-02-21 RX ADMIN — TIZANIDINE 4 MG: 2 TABLET ORAL at 21:51

## 2021-02-21 RX ADMIN — ATORVASTATIN CALCIUM 40 MG: 40 TABLET, FILM COATED ORAL at 16:51

## 2021-02-21 RX ADMIN — TIZANIDINE 4 MG: 2 TABLET ORAL at 11:16

## 2021-02-21 RX ADMIN — LOSARTAN POTASSIUM 25 MG: 25 TABLET, FILM COATED ORAL at 11:17

## 2021-02-21 RX ADMIN — DOCUSATE SODIUM 100 MG: 100 CAPSULE, LIQUID FILLED ORAL at 22:36

## 2021-02-21 RX ADMIN — HEPARIN SODIUM 5000 UNITS: 5000 INJECTION INTRAVENOUS; SUBCUTANEOUS at 21:51

## 2021-02-21 NOTE — PLAN OF CARE
Problem: Potential for Falls  Goal: Patient will remain free of falls  Description: INTERVENTIONS:  - Assess patient frequently for physical needs  -  Identify cognitive and physical deficits and behaviors that affect risk of falls  -  [place] fall precautions as indicated by assessment   - Educate patient/family on patient safety including physical limitations  - Instruct patient to call for assistance with activity based on assessment  - Modify environment to reduce risk of injury  - Consider OT/PT consult to assist with strengthening/mobility  Outcome: Progressing     Problem: SAFETY ADULT  Goal: Patient will remain free of falls  Description: INTERVENTIONS:  - Assess patient frequently for physical needs  -  Identify cognitive and physical deficits and behaviors that affect risk of falls    -  [place] fall precautions as indicated by assessment   - Educate patient/family on patient safety including physical limitations  - Instruct patient to call for assistance with activity based on assessment  - Modify environment to reduce risk of injury  - Consider OT/PT consult to assist with strengthening/mobility  Outcome: Progressing

## 2021-02-21 NOTE — H&P
Internal Medicine  H&P- Jaleel Garcia 1945, 67 y o  male MRN: [id number]  Unit/Bed#: -02 Encounter: [id number]  Primary Care Provider: Omer Murdock DO   Date and time admitted to hospital: 2/21/2021  7:06 AM        Assessment and Plan  * Chest pain in adult  Assessment & Plan  · Chest pain with history of CAD and stenting in [place] between 3161-5030  · Recently established care with Dr Kendra Michelle  · Has not had any recent ischemic testing  Trend cardiac enzymes and monitor on telemetry  Cardiology to evaluate  Results from last 7 days   Lab Units 02/21/21  0725   TROPONIN I ng/mL <0 03       Coronary artery disease  Assessment & Plan  · Coronary artery disease with multiple history of stenting between 1995 in 2003 and [place]  · Does follow with Dr Kendra Michelle  · Continue aspirin and statin    Sleep apnea  Assessment & Plan  · CPAP on 10 cm H2O with 2 L oxygen at bedtime    Lumbar radiculopathy  Assessment & Plan  · Right lumbar radiculopathy appears musculoskeletal   Will trial tizanidine    Chronic diastolic (congestive) heart failure (HCC)  Assessment & Plan  Wt Readings from Last 3 Encounters:   02/21/21 98 kg (215 lb 15 1 oz)   02/04/21 98 kg (216 lb)   01/22/21 98 1 kg (216 lb 3 2 oz)     Results from last 7 days   Lab Units 02/21/21  0725   BNP pg/mL 35     · Chronic diastolic CHF compensated on furosemide and potassium supplement    COPD (chronic obstructive pulmonary disease) (HCC)  Assessment & Plan  · COPD without exacerbation  Continue Symbicort and Spiriva  · Chronic respiratory failure/hypoxia on 2 L unchanged  Hypertension  Assessment & Plan  · Essential hypertension continue losartan and metoprolol    Hyperlipidemia  Assessment & Plan  · Hyperlipidemia    Rosuvastatin will be substitute with atorvastatin based on hospital formulary      VTE Prophylaxis: Heparin  Code Status: Level 1 - Full Code  Anticipated Length of Stay:  Patient will be admitted on an Observation basis with an anticipated length of stay of  less than 2 midnights  Justification for Hospital Stay: Chest pain in adult  Total Time for Visit, including Counseling / Coordination of Care: x mins  Greater than 50% of this total time spent on direct patient counseling and coordination of care  Chief Complaint:     Chief Complaint   Patient presents with    Chest Pain     started last night, took 1 Nitro at 3am, pain subsided but returned at 5 am       History of Present Illness:    Tristan Suarez is a 67 y o  male with a past medical history of coronary artery disease COPD and CHF who presents with worsening chest pain  Patient was sub links no approximately 25 ft two days ago  Yesterday in the afternoon he developed left-sided chest pain that is described as a tightness with radiation towards the right chest   He took two nitroglycerin and went to sleep  Unfortunately pain persisted this morning and he took additional nitroglycerin, became worried given history, and presented here to the emergency department where he is still having 2/10 chest pain  Denies any shortness of breath more than usual   No nausea vomiting or diarrhea  He does complain of right low back pain    Review of Systems:  Review of Systems   Constitutional: Negative for chills, diaphoresis and fever  HENT: Negative for dental problem and facial swelling  Eyes: Negative for photophobia, pain and visual disturbance  Respiratory: Negative for shortness of breath, wheezing and stridor  Cardiovascular: Positive for chest pain  Negative for palpitations  Gastrointestinal: Negative for abdominal distention, abdominal pain, diarrhea, nausea and vomiting  Genitourinary: Negative for dysuria, hematuria and urgency  Musculoskeletal: Positive for back pain  Negative for myalgias  Skin: Negative for rash  Neurological: Negative for dizziness, seizures, speech difficulty, numbness and headaches     Psychiatric/Behavioral: Negative for agitation and suicidal ideas  The patient is not nervous/anxious  All other systems reviewed and are negative  Past Medical and Surgical History:   Past Medical History:   Diagnosis Date    COPD (chronic obstructive pulmonary disease) ([address] )     Coronary artery disease     history of stenting    Hyperlipidemia     Hypertension     Sleep apnea      Past Surgical History:   Procedure Laterality Date    COLONOSCOPY       Meds/Allergies: Allergies: Allergies   Allergen Reactions    Lisinopril Swelling and Cough    Tetanus Antitoxin Anaphylaxis    Tetanus Toxoid Anaphylaxis and Swelling    Pneumococcal Vaccine Swelling     Patient states he is not allergic  Prior to Admission Medications   Prescriptions Last Dose Informant Patient Reported? Taking? Ascorbic Acid (vitamin C) 1000 MG tablet 2021 at Unknown time  Yes Yes   Sig: Take 1,000 mg by mouth daily   Multiple Vitamin (multivitamin) tablet 2021 at Unknown time  Yes Yes   Sig: Take 1 tablet by mouth daily   albuterol (5 mg/mL) 0 5 % nebulizer solution  Self Yes Yes   Si 5 mg every 6 (six) hours as needed    aspirin 81 mg chewable tablet 2021 at Unknown time Self Yes Yes   Sig: CHEW ONE TABLET BY MOUTH EVERY DAY   azithromycin (ZITHROMAX) 250 mg tablet Past Week at Unknown time  No Yes   Sig: Take 1 every Monday, Wednesday and Friday     budesonide-formoterol (SYMBICORT) 160-4 5 mcg/act inhaler 2021 at Unknown time  Yes Yes   Sig: INHALE TWO INHALATIONS BY MOUTH TWICE A DAY - RINSE MOUTH AFTER USE   famotidine (PEPCID) 20 mg tablet 2021 at Unknown time  No Yes   Sig: Take 1 tablet (20 mg total) by mouth daily   fluticasone (FLONASE) 50 mcg/act nasal spray  Self Yes Yes   Sig: into each nostril as needed    furosemide (LASIX) 40 mg tablet   Yes Yes   Sig: Take 40 mg by mouth daily   losartan (COZAAR) 25 mg tablet 2021 at Unknown time  No Yes   Sig: Take 1 tablet (25 mg total) by mouth daily metoprolol succinate (TOPROL-XL) 25 mg 24 hr tablet   No No   Sig: Take 1 tablet (25 mg total) by mouth daily   nitroglycerin (NITROSTAT) 0 4 mg SL tablet 2021 at 0530 Self Yes Yes   Sig: DISSOLVE ONE TABLET UNDER THE TONGUE  PRN   potassium chloride (Klor-Con) 10 mEq tablet   Yes Yes   Sig: Take 20 mEq by mouth daily   rosuvastatin (CRESTOR) 40 MG tablet 2021 at Unknown time  No Yes   Sig: Take 1 tablet (40 mg total) by mouth daily   tiotropium (SPIRIVA) 18 mcg inhalation capsule 2021 at Unknown time Self Yes Yes   Sig: INHALE CONTENTS OF ONE CAPSULE BY MOUTH ONCE DAILY USING HANDIHALER DEVICE      Facility-Administered Medications: None     Social History:     Social History     Socioeconomic History    Marital status: /Civil Union     Spouse name: Not on file    Number of children: Not on file    Years of education: Not on file    Highest education level: Not on file   Occupational History    Not on file   Social Needs    Financial resource strain: Not on file    Food insecurity     Worry: Not on file     Inability: Not on file   [employer] needs     Medical: Not on file     Non-medical: Not on file   Tobacco Use    Smoking status: Former Smoker     Quit date:      Years since quittin 1    Smokeless tobacco: Never Used   Substance and Sexual Activity    Alcohol use: Never     Frequency: Never    Drug use: Never    Sexual activity: Not on file   Lifestyle    Physical activity     Days per week: Not on file     Minutes per session: Not on file    Stress: Not on file   Relationships    Social connections     Talks on phone: Not on file     Gets together: Not on file     Attends Yazidi service: Not on file     Active member of club or organization: Not on file     Attends meetings of clubs or organizations: Not on file     Relationship status: Not on file    Intimate partner violence     Fear of current or ex partner: Not on file     Emotionally abused: Not on file     Physically abused: Not on file     Forced sexual activity: Not on file   Other Topics Concern    Not on file   Social History Narrative    Not on file     Patient Pre-hospital Living Situation:   Patient Pre-hospital Level of Mobility:   Patient Pre-hospital Diet Restrictions:     Family History:  Family History   Problem Relation Age of Onset    Heart attack Mother     Dementia Mother      Physical Exam:   Vitals:   Blood Pressure: 131/74 (02/21/21 0855)  Pulse: 66 (02/21/21 0855)  Temperature: (!) 97 2 °F (36 2 °C) (02/21/21 0855)  Temp Source: Temporal (02/21/21 0855)  Respirations: 20 (02/21/21 0855)  Height: 5' 8" (172 7 cm) (02/21/21 0855)  Weight - Scale: 98 kg (215 lb 15 1 oz) (02/21/21 0855)  SpO2: 98 % (02/21/21 0855)    Physical Exam  Vitals signs reviewed  Constitutional:       General: He is not in acute distress  HENT:      Head: Atraumatic  Eyes:      Extraocular Movements: Extraocular movements intact  Pupils: Pupils are equal, round, and reactive to light  Neck:      Musculoskeletal: Neck supple  Cardiovascular:      Rate and Rhythm: Regular rhythm  Heart sounds: Normal heart sounds  Pulmonary:      Effort: Pulmonary effort is normal       Breath sounds: Decreased breath sounds present  No wheezing  Abdominal:      General: Bowel sounds are normal       Palpations: Abdomen is soft  Tenderness: There is no abdominal tenderness  There is no rebound  Musculoskeletal:         General: Tenderness (Right lower back) present  No swelling  Skin:     General: Skin is warm and dry  Neurological:      General: No focal deficit present  Mental Status: He is alert and oriented to person, place, and time  Cranial Nerves: No cranial nerve deficit  Psychiatric:         Mood and Affect: Mood normal        Lab Results: I have personally reviewed pertinent reports      Results from last 7 days   Lab Units 02/21/21  0725   WBC Thousand/uL 10 60   HEMOGLOBIN g/dL 15 3   HEMATOCRIT % 46 0   PLATELETS Thousands/uL 178   NEUTROS PCT % 68   LYMPHS PCT % 13*   MONOS PCT % 14*   EOS PCT % 5     Results from last 7 days   Lab Units 02/21/21  0725   SODIUM mmol/L 141   POTASSIUM mmol/L 4 0   CHLORIDE mmol/L 99   CO2 mmol/L 34*   ANION GAP mmol/L 8   BUN mg/dL 18   CREATININE mg/dL 1 13   CALCIUM mg/dL 8 6   ALBUMIN g/dL 3 9   TOTAL BILIRUBIN mg/dL 0 60   ALK PHOS U/L 70   ALT U/L 20   AST U/L 20   EGFR ml/min/1 73sq m 63   GLUCOSE RANDOM mg/dL 96     Results from last 7 days   Lab Units 02/21/21  0725   INR  1 00     Results from last 7 days   Lab Units 02/21/21  0725   TROPONIN I ng/mL <0 03         Results from last 7 days   Lab Units 02/21/21  0725   D-DIMER QUANTITATIVE ug/ml FEU 0 58*             Results from last 7 days   Lab Units 02/21/21  0725   INFLUENZA A PCR  Negative   INFLUENZA B PCR  Negative   RSV PCR  Negative     Imaging: I have personally reviewed pertinent films in PACS  XR chest portable  Date:  2/21/2021  For the impression:  No active disease    EKG, Pathology, and Other Studies Reviewed on Admission:   EKG  Result Date: 02/21/21  Personally reviewed strips with impression of:  Normal sinus rhythm 70 bpm    Allscripts/ Epic Records Reviewed: Yes    ** Please Note: This note has been constructed using a voice recognition system   **

## 2021-02-21 NOTE — ASSESSMENT & PLAN NOTE
Extensive cardiac history with a report of 10 prior stents  RCA stents 1999, 2001 with prior PTCA; LAD stenting in 2003    Possibly other procedures but records are not available  Continue aspirin, statin, beta-blocker

## 2021-02-21 NOTE — ASSESSMENT & PLAN NOTE
Wt Readings from Last 3 Encounters:   02/21/21 98 kg (215 lb 15 1 oz)   02/04/21 98 kg (216 lb)   01/22/21 98 1 kg (216 lb 3 2 oz)     Results from last 7 days   Lab Units 02/21/21  0725   BNP pg/mL 35     · Chronic diastolic CHF compensated on furosemide and potassium supplement

## 2021-02-21 NOTE — ED PROVIDER NOTES
History  Chief Complaint   Patient presents with    Chest Pain     started last night, took 1 Nitro at 3am, pain subsided but returned at 5 am       Patient is a 50-year-old male with history of CAD status post 10 stents, COPD on 2 L oxygen, hypertension, hyperlipidemia who presents for chest pain  Patient says the pain started last night and has been waxing and waning  He says that he took a nitro around 3:00 a m  Which helped with the discomfort  Patient says that he has been having persistent chest discomfort but that it has greatly improved  He says is located in the left chest and will sometimes shoot across the chest   Patient denies associated lightheadedness, dizziness, nausea or vomiting  He says that he feels like he has been having more shortness of breath over the past 2 weeks  He denies any increasing leg swelling, difficulty lying flat  Patient recently had his 2nd [place] vaccination  He denies any cough, fevers or chills  The patient says that it is rare for him to take a nitro for chest pain and that the medication usually expires before he needs them            Prior to Admission Medications   Prescriptions Last Dose Informant Patient Reported? Taking? Ascorbic Acid (vitamin C) 1000 MG tablet 2021 at Unknown time  Yes Yes   Sig: Take 1,000 mg by mouth daily   Multiple Vitamin (multivitamin) tablet 2021 at Unknown time  Yes Yes   Sig: Take 1 tablet by mouth daily   albuterol (5 mg/mL) 0 5 % nebulizer solution  Self Yes Yes   Si 5 mg every 6 (six) hours as needed    aspirin 81 mg chewable tablet 2021 at Unknown time Self Yes Yes   Sig: CHEW ONE TABLET BY MOUTH EVERY DAY   azithromycin (ZITHROMAX) 250 mg tablet Past Week at Unknown time  No Yes   Sig: Take 1 every Monday, Wednesday and Friday     budesonide-formoterol (SYMBICORT) 160-4 5 mcg/act inhaler 2021 at Unknown time  Yes Yes   Sig: INHALE TWO INHALATIONS BY MOUTH TWICE A DAY - RINSE MOUTH AFTER USE famotidine (PEPCID) 20 mg tablet 2/20/2021 at Unknown time  No Yes   Sig: Take 1 tablet (20 mg total) by mouth daily   fluticasone (FLONASE) 50 mcg/act nasal spray  Self Yes Yes   Sig: into each nostril as needed    furosemide (LASIX) 40 mg tablet 2/20/2021 at Unknown time Self No Yes   Sig: Take 1 tablet (40 mg total) by mouth as needed (only when legs swollen in am )   Patient taking differently: Take 40 mg by mouth daily    losartan (COZAAR) 25 mg tablet 2/20/2021 at Unknown time  No Yes   Sig: Take 1 tablet (25 mg total) by mouth daily   metoprolol succinate (TOPROL-XL) 25 mg 24 hr tablet   No No   Sig: Take 1 tablet (25 mg total) by mouth daily   nitroglycerin (NITROSTAT) 0 4 mg SL tablet 2/21/2021 at 0530 Self Yes Yes   Sig: DISSOLVE ONE TABLET UNDER THE TONGUE  PRN   potassium chloride (K-DUR,KLOR-CON) 20 mEq tablet 2/20/2021 at Unknown time Self No Yes   Sig: Take 1 tablet (20 mEq total) by mouth daily as needed (morning leg swelling )   Patient taking differently: Take 20 mEq by mouth daily    rosuvastatin (CRESTOR) 40 MG tablet 2/20/2021 at Unknown time  No Yes   Sig: Take 1 tablet (40 mg total) by mouth daily   tiotropium (SPIRIVA) 18 mcg inhalation capsule 2/21/2021 at Unknown time Self Yes Yes   Sig: INHALE CONTENTS OF ONE CAPSULE BY MOUTH ONCE DAILY USING HANDIHALER DEVICE      Facility-Administered Medications: None       Past Medical History:   Diagnosis Date    COPD [facility]ch[facility] obstructive pulmonary disease) ([facility] [address] [facility]     Coronary artery disease     history of stenting    Hyperlipidemia     Hypertension     Sleep apnea        Past Surgical History:   Procedure Laterality Date    COLONOSCOPY         Family History   Problem Relation Age of Onset    Heart attack Mother     Dementia Mother      I have reviewed and agree with the history as documented      E-Cigarette/Vaping    E-Cigarette Use Never User      E-Cigarette/Vaping Substances    Nicotine No     THC No     CBD No     Flavoring No     Other No     Unknown No      Social History     Tobacco Use    Smoking status: Former Smoker     Quit date:      Years since quittin     Smokeless tobacco: Never Used   Substance Use Topics    Alcohol use: Never     Frequency: Never    Drug use: Never       Review of Systems   Constitutional: Negative for chills, fever and unexpected weight change  HENT: Negative for congestion, sore throat and trouble swallowing  Eyes: Negative for pain, discharge and itching  Respiratory: Positive for shortness of breath  Negative for cough and wheezing  Cardiovascular: Positive for chest pain  Negative for palpitations and leg swelling  Gastrointestinal: Negative for abdominal pain, blood in stool, diarrhea, nausea and vomiting  Endocrine: Negative for polyuria  Genitourinary: Negative for difficulty urinating, dysuria, frequency and hematuria  Musculoskeletal: Negative for arthralgias and back pain  Neurological: Negative for dizziness, syncope, weakness, light-headedness and headaches  Physical Exam  Physical Exam  Vitals signs and nursing note reviewed  Constitutional:       General: He is not in acute distress  Appearance: He is well-developed  HENT:      Head: Normocephalic and atraumatic  Right Ear: External ear normal       Left Ear: External ear normal    Eyes:      Conjunctiva/sclera: Conjunctivae normal       Pupils: Pupils are equal, round, and reactive to light  Neck:      Musculoskeletal: Normal range of motion  Cardiovascular:      Rate and Rhythm: Normal rate and regular rhythm  Heart sounds: Normal heart sounds  No murmur  No friction rub  No gallop  Pulmonary:      Effort: Pulmonary effort is normal  No respiratory distress  Breath sounds: Normal breath sounds  No wheezing or rales  Abdominal:      General: Bowel sounds are normal  There is no distension  Palpations: Abdomen is soft  Tenderness: There is no abdominal tenderness  There is no guarding  Musculoskeletal: Normal range of motion  General: No tenderness or deformity  Lymphadenopathy:      Cervical: No cervical adenopathy  Skin:     General: Skin is warm and dry  Neurological:      Mental Status: He is alert and oriented to person, place, and time  Cranial Nerves: No cranial nerve deficit  Sensory: No sensory deficit  Motor: No abnormal muscle tone  Psychiatric:         Behavior: Behavior normal          Vital Signs  ED Triage Vitals [02/21/21 0710]   Temperature Pulse Respirations Blood Pressure SpO2   98 2 °F (36 8 °C) 77 20 131/64 92 %      Temp Source Heart Rate Source Patient Position - Orthostatic VS BP Location FiO2 (%)   Oral Monitor -- -- --      Pain Score       --           Vitals:    02/21/21 0710 02/21/21 0745 02/21/21 0800   BP: 131/64 111/59 137/67   Pulse: 77 67 65         Visual Acuity      ED Medications  Medications - No data to display    Diagnostic Studies  Results Reviewed     Procedure Component Value Units Date/Time    COVID19, Influenza A/B, RSV PCR, [facility] [[id number]]  (Normal) Collected: 02/21/21 0725    Lab Status: Final result Specimen: Nares from Nasopharyngeal Swab Updated: 02/21/21 0811     SARS-CoV-2 Negative     INFLUENZA A PCR Negative     INFLUENZA B PCR Negative     RSV PCR Negative    Narrative: This test has been authorized by FDA under an EUA (Emergency Use Assay) for use by authorized laboratories  Clinical caution and judgement should be used with the interpretation of these results with consideration of the clinical impression and other laboratory testing  Testing reported as "Positive" or "Negative" has been proven to be accurate according to standard laboratory validation requirements  All testing is performed with control materials showing appropriate reactivity at standard intervals      B-Type Natriuretic Peptide ([address]) [[id number]]  (Normal) Collected: 02/21/21 0725    Lab Status: Final result Specimen: Blood from Arm, Right Updated: 02/21/21 0758     BNP 35 pg/mL     Comprehensive metabolic panel [[id number]]  (Abnormal) Collected: 02/21/21 0725    Lab Status: Final result Specimen: Blood from Arm, Right Updated: 02/21/21 0757     Sodium 141 mmol/L      Potassium 4 0 mmol/L      Chloride 99 mmol/L      CO2 34 mmol/L      ANION GAP 8 mmol/L      BUN 18 mg/dL      Creatinine 1 13 mg/dL      Glucose 96 mg/dL      Calcium 8 6 mg/dL      AST 20 U/L      ALT 20 U/L      Alkaline Phosphatase 70 U/L      Total Protein 6 2 g/dL      Albumin 3 9 g/dL      Total Bilirubin 0 60 mg/dL      eGFR 63 ml/min/1 73sq m     Narrative:      [place] guidelines for Chronic Kidney Disease (CKD):     Stage 1 with normal or high GFR (GFR > 90 mL/min/1 73 square meters)    Stage 2 Mild CKD (GFR = 60-89 mL/min/1 73 square meters)    Stage 3A Moderate CKD (GFR = 45-59 mL/min/1 73 square meters)    Stage 3B Moderate CKD (GFR = 30-44 mL/min/1 73 square meters)    Stage 4 Severe CKD (GFR = 15-29 mL/min/1 73 square meters)    Stage 5 End Stage CKD (GFR <15 mL/min/1 73 square meters)  Note: GFR calculation is accurate only with a steady state creatinine    Protime-INR [[id number]]  (Normal) Collected: 02/21/21 0725    Lab Status: Final result Specimen: Blood from Arm, Right Updated: 02/21/21 0757     Protime 13 1 seconds      INR 1 00    APTT [[id number]]  (Normal) Collected: 02/21/21 0725    Lab Status: Final result Specimen: Blood from Arm, Right Updated: 02/21/21 0757     PTT 26 seconds     Troponin I [[id number]]  (Normal) Collected: 02/21/21 0725    Lab Status: Final result Specimen: Blood from Arm, Right Updated: 02/21/21 0755     Troponin I <0 03 ng/mL     D-dimer, quantitative [[id number]]  (Abnormal) Collected: 02/21/21 0725    Lab Status: Final result Specimen: Blood from Arm, Right Updated: 02/21/21 0749     D-Dimer, Quant 0 58 ug/ml FEU     CBC and differential [[id number]] (Abnormal) Collected: 02/21/21 0725    Lab Status: Final result Specimen: Blood from Arm, Right Updated: 02/21/21 0737     WBC 10 60 Thousand/uL      RBC 4 76 Million/uL      Hemoglobin 15 3 g/dL      Hematocrit 46 0 %      MCV 97 fL      MCH 32 2 pg      MCHC 33 2 g/dL      RDW 13 9 %      MPV 8 5 fL      Platelets 648 Thousands/uL      Neutrophils Relative 68 %      Lymphocytes Relative 13 %      Monocytes Relative 14 %      Eosinophils Relative 5 %      Basophils Relative 1 %      Neutrophils Absolute 7 10 Thousands/µL      Lymphocytes Absolute 1 40 Thousands/µL      Monocytes Absolute 1 40 Thousand/µL      Eosinophils Absolute 0 60 Thousand/µL      Basophils Absolute 0 10 Thousands/µL                  XR chest 1 view portable    (Results Pending)              Procedures  ECG 12 Lead Documentation Only    Date/Time: 2/21/2021 7:18 AM  Performed by: Colt Gusman DO  Authorized by: Colt Gusman DO     Indications / Diagnosis:  Chest pain  ECG reviewed by me, the ED Provider: yes    Patient location:  ED  Previous ECG:     Previous ECG:  Compared to current    Similarity:  No change    Comparison to cardiac monitor: Yes    Interpretation:     Interpretation: abnormal    Rate:     ECG rate:  70    ECG rate assessment: normal    Rhythm:     Rhythm: sinus rhythm    Ectopy:     Ectopy: none    QRS:     QRS axis:  Normal  Conduction:     Conduction: normal    ST segments:     ST segments:  Normal  T waves:     T waves: normal               ED Course  ED Course as of Feb 21 0819   Sun Feb 21, 2021   0752 Per age adjusted D dimer, d-dimer is negative                 HEART Risk Score      Most Recent Value   Heart Score Risk Calculator   History  1 Filed at: 02/21/2021 0759   ECG  0 Filed at: 02/21/2021 0759   Age  2 Filed at: 02/21/2021 0759   Risk Factors  2 Filed at: 02/21/2021 0759   Troponin  0 Filed at: 02/21/2021 0759   HEART Score  5 Filed at: 02/21/2021 0759                                    [facility]  Number of Diagnoses or Management Options  Chest pain:   Diagnosis management comments: 69-year-old male with history of CAD status post 10 stents, COPD on oxygen presenting for chest pain  Has been off and on since last night  Has been having mild worsening shortness breath over the past 2 weeks  No leg swelling or signs of CHF  Vitals within normal limits  Patient did take a nitro for the chest pain  Will obtain cardiac workup, D-dimer  Will obtain chest x-ray  Labs WNL  Heart Score 5  For age adjusted criteria, D-dimer within normal limits  Do not believe patient requires CT PE study at this time  Patient admitted to [facility] as observation  Disposition  Final diagnoses:   Chest pain     Time reflects when diagnosis was documented in both MDM as applicable and the Disposition within this note     Time User Action Codes Description Comment    2/21/2021  8:12 AM Aryan Muro Add [R07 9] Chest pain       ED Disposition     ED Disposition Condition Date/Time Comment    Admit Stable Susie Feb 21, 2021  8:12 AM Case was discussed with AMANDA and the patient's admission status was agreed to be Admission Status: observation status to the service of Dr Nga Kamara   Follow-up Information    None         Patient's Medications   Discharge Prescriptions    No medications on file     No discharge procedures on file      PDMP Review     None          ED Provider  Electronically Signed by           Kamini Rudd DO  02/21/21 9779

## 2021-02-21 NOTE — ASSESSMENT & PLAN NOTE
· Coronary artery disease with multiple history of stenting between 1995 in 2003 and [place]  · Does follow with Dr Manolo Daniels  · Continue aspirin and statin

## 2021-02-21 NOTE — ASSESSMENT & PLAN NOTE
· COPD without exacerbation  Continue Symbicort and Spiriva  · Chronic respiratory failure/hypoxia on 2 L unchanged

## 2021-02-21 NOTE — RESPIRATORY THERAPY NOTE
RT Protocol Note  Sweta Larson 67 y o  male MRN: [id number]  Unit/Bed#: -02 Encounter: [id number]    Assessment    Principal Problem:    Chest pain in adult  Active Problems:    Hyperlipidemia    Coronary artery disease    Hypertension    COPD (chronic obstructive pulmonary disease) (HCC)    Chronic diastolic (congestive) heart failure (HCC)    Lumbar radiculopathy    Sleep apnea      Home Pulmonary Medications:  Albuterol MDI PRN  Albuterol Neb Q 6 PRN  Symbicort  Spiriva     Home Devices/Therapy: Home O2, BiPAP/CPAP   Patient wears 2 liters as needed during the day and HS with his Cpap      Past Medical History:   Diagnosis Date    COPD [facility]ch[facility] obstructive [facility] disease) ([facility] [address]     Coronary artery disease     history of stenting    Hyperlipidemia     Hypertension     Sleep apnea      Social History     Socioeconomic History    Marital status: /Civil Union     Spouse name: None    Number of children: None    Years of education: None    Highest education level: None   Occupational History    None   Social Needs    Financial resource strain: None    Food insecurity     Worry: None     Inability: None    Transportation needs     Medical: None     Non-medical: None   Tobacco Use    Smoking status: Former Smoker     Quit date:      Years since quittin     Smokeless tobacco: Never Used   Substance and Sexual Activity    Alcohol use: Never     Frequency: Never    Drug use: Never    Sexual activity: None   Lifestyle    Physical activity     Days per week: None     Minutes per session: None    Stress: None   Relationships    Social connections     Talks on phone: None     Gets together: None     Attends Jewish service: None     Active member of club or organization: None     Attends meetings of clubs or organizations: None     Relationship status: None    Intimate partner violence     Fear of current or ex partner: None     Emotionally abused: None     Physically abused: None     Forced sexual activity: None   Other Topics Concern    None   Social History Narrative    None       Subjective         Objective    Physical Exam:   Assessment Type: Assess only  General Appearance: Alert, Awake  Respiratory Pattern: Normal  Chest Assessment: Chest expansion symmetrical  Bilateral Breath Sounds: Clear, Diminished  Cough: None    Vitals:  Blood pressure 131/74, pulse 66, temperature (!) 97 2 °F (36 2 °C), temperature source Temporal, resp  rate 20, height 5' 8" (1 727 m), weight 98 kg (215 lb 15 1 oz), SpO2 98 %  Imaging and other studies: I have personally reviewed pertinent reports  Plan    Respiratory Plan: Home Bronchodilator Patient pathway        Resp Comments: Patient has no SOB or distress noted  Will ordered patient's  Albuterol rescue inhaler Q 4 PRN  Patient Symbicort and Spiriva ordered by    Patient wants to try and have his wife bring his Cpap from  home

## 2021-02-21 NOTE — ASSESSMENT & PLAN NOTE
Chest pain with normal troponin level  Continue to trend troponin    If troponin remains negative, we will arrange for outpatient Lexiscan stress test   If troponin levels rise, will evaluate need for catheterization verses inpatient stress test

## 2021-02-21 NOTE — ASSESSMENT & PLAN NOTE
· Chest pain with history of CAD and stenting in [place] between 4865-8627  · Recently established care with Dr Yisel Gonzalez  · Has not had any recent ischemic testing  Trend cardiac enzymes and monitor on telemetry  Cardiology to evaluate      Results from last 7 days   Lab Units 02/21/21  0725   TROPONIN I ng/mL <0 03

## 2021-02-21 NOTE — CONSULTS
Consult- Luis A Pepe 1945, 67 y o  male MRN: [id number]    Unit/Bed#: -02 Encounter: [id number]    Primary Care Provider: Robbi Garcia DO   Date and time admitted to hospital: 2/21/2021  7:06 AM      Inpatient consult to Cardiology  Consult performed by: INOCENTE Bryant  Consult ordered by: Jovanny Bojorquez DO        Hypertension  Assessment & Plan  Blood pressure controlled  Continue current medications    Coronary artery disease  Assessment & Plan  Extensive cardiac history with a report of 10 prior stents  RCA stents 1999, 2001 with prior PTCA; LAD stenting in 2003  Possibly other procedures but records are not available  Continue aspirin, statin, beta-blocker    Hyperlipidemia  Assessment & Plan  Continue Lipitor while in hospital    May resume Crestor on discharge    * Chest pain in adult  Assessment & Plan  Chest pain with normal troponin level  Continue to trend troponin  If troponin remains negative, we will arrange for outpatient Lexiscan stress test   If troponin levels rise, will evaluate need for catheterization verses inpatient stress test      Other summary comments:   Continue to trend troponin  If troponins remain negative, arrangements will be made for outpatient nuclear stress testing  Continue current medications  Outpatient Cardiologist: Dr Chad Larios    HPI: Luis A Pepe is a 67y o  year old male seen in consultation for chest pain  Fiorella Valderrama is a pleasant 53-year-old male with an extensive history of CAD and prior stents  He notes that last evening following dinner he developed a chest discomfort while watching TV  He took a nitro with some improvement however the chest pain has remained persistent but improved  Fiorella Valderrama notes that his activity is somewhat limited due to his COPD which has been worsening over the past few months  He did go outside and pushed snow with a shovel on his side walk for a few minutes    He denied any chest discomfort or worsening of his breathing at this time  He does have a little residual chest discomfort at the time of my evaluation  He denies any palpitations or worsening of his breathing at this time  Cardiac history is significant for CAD with multiple stents to the LAD and RCA, the last being placed in   He has a history of a remote right carotid endarterectomy which remain patent on recent imaging and a known left carotid occlusion  He is treated for hypertension and hyperlipidemia  He follows with Pulmonary for COPD  He uses oxygen at night and daily during the day as needed  He states that over the past few months he has noted a decline in his breathing status  He is able to walk up the steps but needs to rest at the top and sometimes uses oxygen for recovery  EKG:   SR    MOST  RECENT CARDIAC IMAGING:   Echo 2020 EF 55%, mild DD  Mild MR, AS      Review of Systems: a 10 point review of systems was conducted and is negative except for as mentioned in the HPI or as below          Historical Information   Past Medical History:   Diagnosis Date    COPD [facility] obstructive pulmonary disease) ([facility] [address]     Coronary artery disease     history of stenting    Hyperlipidemia     Hypertension     Sleep apnea      Past Surgical History:   Procedure Laterality Date    COLONOSCOPY       Social History     Substance and Sexual Activity   Alcohol Use Never    Frequency: Never     Social History     Substance and Sexual Activity   Drug Use Never     Social History     Tobacco Use   Smoking Status Former Smoker    Quit date:     Years since quittin 1   Smokeless Tobacco Never Used       Family History:   No longer pertinent given patient's history    Meds/Allergies   all current active meds have been reviewed  Medications Prior to Admission   Medication    albuterol (5 mg/mL) 0 5 % nebulizer solution    Ascorbic Acid (vitamin C) 1000 MG tablet    aspirin 81 mg chewable tablet    azithromycin (ZITHROMAX) 250 mg tablet    budesonide-formoterol (SYMBICORT) 160-4 5 mcg/act inhaler    famotidine (PEPCID) 20 mg tablet    fluticasone (FLONASE) 50 mcg/act nasal spray    furosemide (LASIX) 40 mg tablet    losartan (COZAAR) 25 mg tablet    Multiple Vitamin (multivitamin) tablet    nitroglycerin (NITROSTAT) 0 4 mg SL tablet    potassium chloride (Klor-Con) 10 mEq tablet    rosuvastatin (CRESTOR) 40 MG tablet    tiotropium (SPIRIVA) 18 mcg inhalation capsule    metoprolol succinate (TOPROL-XL) 25 mg 24 hr tablet       Allergies   Allergen Reactions    Lisinopril Swelling and Cough    Tetanus Antitoxin Anaphylaxis    Tetanus Toxoid Anaphylaxis and Swelling    Pneumococcal Vaccine Swelling     Patient states he is not allergic  Objective   Vitals: Blood pressure 131/74, pulse 66, temperature (!) 97 2 °F (36 2 °C), temperature source Temporal, resp  rate 20, height 5' 8" (1 727 m), weight 98 kg (215 lb 15 1 oz), SpO2 98 %  , Body mass index is 32 83 kg/m² ,   Orthostatic Blood Pressures      Most Recent Value   Blood Pressure  131/74 filed at 02/21/2021 0855   Patient Position - Orthostatic VS  Lying filed at 02/21/2021 8864          Systolic (16DYL), YKO:186 , Min:111 , DJH:579     Diastolic (20NWI), DPD:39, Min:59, Max:74    Physical Exam:    General:  Normal appearance in no distress  Eyes:  Anicteric  Oral mucosa:  Moist   Neck:  No JVD  Carotid upstrokes are brisk without bruits  No masses  Chest:  Diminished throughout but clear  Cardiac:  Distant heart sounds  Abdomen:  Soft and nontender  No palpable organomegaly or aortic enlargement  Extremities:  No peripheral edema  Musculoskeletal:  Symmetric  Vascular:  Femoral pulses are brisk without bruits  Popliteal  pulses are intact bilaterally  Pedal pulses are intact  Neuro:  Grossly symmetric  Psych:  Alert and oriented x3        Lab Results:     Troponins:   Results from last 7 days   Lab Units 02/21/21  0725   TROPONIN I ng/mL <0 03     BNP:   Results from last 6 Months   Lab Units 02/21/21  0725   BNP pg/mL 35       CBC :   Results from last 7 days   Lab Units 02/21/21  0725   WBC Thousand/uL 10 60   HEMOGLOBIN g/dL 15 3   HEMATOCRIT % 46 0   MCV fL 97   PLATELETS Thousands/uL 178     TSH:     CMP:   Results from last 7 days   Lab Units 02/21/21  0725   POTASSIUM mmol/L 4 0   CHLORIDE mmol/L 99   CO2 mmol/L 34*   BUN mg/dL 18   CREATININE mg/dL 1 13   AST U/L 20   ALT U/L 20   EGFR ml/min/1 73sq m 63     Lipid Profile:     Coags:   Results from last 7 days   Lab Units 02/21/21  0725   INR  1 00

## 2021-02-22 VITALS
TEMPERATURE: 97.7 F | OXYGEN SATURATION: 86 % | DIASTOLIC BLOOD PRESSURE: 64 MMHG | BODY MASS INDEX: 32.73 KG/M2 | HEIGHT: 68 IN | SYSTOLIC BLOOD PRESSURE: 151 MMHG | RESPIRATION RATE: 20 BRPM | WEIGHT: 215.94 LBS | HEART RATE: 69 BPM

## 2021-02-22 LAB
ALBUMIN SERPL BCP-MCNC: 3.6 G/DL (ref 3.5–5.7)
ALP SERPL-CCNC: 59 U/L (ref 55–165)
ALT SERPL W P-5'-P-CCNC: 16 U/L (ref 7–52)
ANION GAP SERPL CALCULATED.3IONS-SCNC: 10 MMOL/L (ref 4–13)
AST SERPL W P-5'-P-CCNC: 17 U/L (ref 13–39)
BILIRUB SERPL-MCNC: 0.6 MG/DL (ref 0.2–1)
BUN SERPL-MCNC: 15 MG/DL (ref 7–25)
CALCIUM SERPL-MCNC: 8.4 MG/DL (ref 8.6–10.5)
CHLORIDE SERPL-SCNC: 101 MMOL/L (ref 98–107)
CHOLEST SERPL-MCNC: 112 MG/DL (ref 0–200)
CO2 SERPL-SCNC: 29 MMOL/L (ref 21–31)
CREAT SERPL-MCNC: 1.02 MG/DL (ref 0.7–1.3)
ERYTHROCYTE [DISTWIDTH] IN BLOOD BY AUTOMATED COUNT: 14 % (ref 11.5–14.5)
GFR SERPL CREATININE-BSD FRML MDRD: 71 ML/MIN/1.73SQ M
GLUCOSE P FAST SERPL-MCNC: 95 MG/DL (ref 65–99)
GLUCOSE SERPL-MCNC: 95 MG/DL (ref 65–99)
HCT VFR BLD AUTO: 42.9 % (ref 42–47)
HDLC SERPL-MCNC: 42 MG/DL
HGB BLD-MCNC: 14.4 G/DL (ref 14–18)
LDLC SERPL CALC-MCNC: 51 MG/DL (ref 0–100)
MCH RBC QN AUTO: 32.4 PG (ref 26–34)
MCHC RBC AUTO-ENTMCNC: 33.7 G/DL (ref 31–37)
MCV RBC AUTO: 96 FL (ref 81–99)
NONHDLC SERPL-MCNC: 70 MG/DL
PLATELET # BLD AUTO: 166 THOUSANDS/UL (ref 149–390)
PMV BLD AUTO: 9.2 FL (ref 8.6–11.7)
POTASSIUM SERPL-SCNC: 3.9 MMOL/L (ref 3.5–5.5)
PROT SERPL-MCNC: 5.7 G/DL (ref 6.4–8.9)
RBC # BLD AUTO: 4.46 MILLION/UL (ref 4.3–5.9)
SODIUM SERPL-SCNC: 140 MMOL/L (ref 134–143)
TRIGL SERPL-MCNC: 94 MG/DL (ref 44–166)
WBC # BLD AUTO: 9.5 THOUSAND/UL (ref 4.8–10.8)

## 2021-02-22 PROCEDURE — 99214 OFFICE O/P EST MOD 30 MIN: CPT | Performed by: PHYSICIAN ASSISTANT

## 2021-02-22 PROCEDURE — 85027 COMPLETE CBC AUTOMATED: CPT | Performed by: INTERNAL MEDICINE

## 2021-02-22 PROCEDURE — 80053 COMPREHEN METABOLIC PANEL: CPT | Performed by: INTERNAL MEDICINE

## 2021-02-22 PROCEDURE — 94664 DEMO&/EVAL PT USE INHALER: CPT

## 2021-02-22 PROCEDURE — 94760 N-INVAS EAR/PLS OXIMETRY 1: CPT

## 2021-02-22 PROCEDURE — 99217 PR OBSERVATION CARE DISCHARGE MANAGEMENT: CPT | Performed by: NURSE PRACTITIONER

## 2021-02-22 PROCEDURE — 80061 LIPID PANEL: CPT | Performed by: INTERNAL MEDICINE

## 2021-02-22 RX ORDER — TIZANIDINE 4 MG/1
4 TABLET ORAL EVERY 8 HOURS PRN
Qty: 10 TABLET | Refills: 0 | Status: SHIPPED | OUTPATIENT
Start: 2021-02-22 | End: 2021-04-21 | Stop reason: SDUPTHER

## 2021-02-22 RX ADMIN — ASPIRIN 81 MG: 81 TABLET, CHEWABLE ORAL at 08:10

## 2021-02-22 RX ADMIN — FAMOTIDINE 20 MG: 20 TABLET ORAL at 08:10

## 2021-02-22 RX ADMIN — LOSARTAN POTASSIUM 25 MG: 25 TABLET, FILM COATED ORAL at 08:11

## 2021-02-22 RX ADMIN — AZITHROMYCIN MONOHYDRATE 250 MG: 250 TABLET ORAL at 08:10

## 2021-02-22 RX ADMIN — TIZANIDINE 4 MG: 2 TABLET ORAL at 08:11

## 2021-02-22 RX ADMIN — TIOTROPIUM BROMIDE 18 MCG: 18 CAPSULE ORAL; RESPIRATORY (INHALATION) at 08:16

## 2021-02-22 RX ADMIN — METOPROLOL SUCCINATE 25 MG: 25 TABLET, EXTENDED RELEASE ORAL at 08:10

## 2021-02-22 RX ADMIN — FUROSEMIDE 40 MG: 40 TABLET ORAL at 08:11

## 2021-02-22 RX ADMIN — POTASSIUM CHLORIDE 20 MEQ: 1500 TABLET, EXTENDED RELEASE ORAL at 08:12

## 2021-02-22 RX ADMIN — DOCUSATE SODIUM 100 MG: 100 CAPSULE, LIQUID FILLED ORAL at 08:10

## 2021-02-22 RX ADMIN — BUDESONIDE AND FORMOTEROL FUMARATE DIHYDRATE 2 PUFF: 160; 4.5 AEROSOL RESPIRATORY (INHALATION) at 08:16

## 2021-02-22 RX ADMIN — HEPARIN SODIUM 5000 UNITS: 5000 INJECTION INTRAVENOUS; SUBCUTANEOUS at 05:01

## 2021-02-22 NOTE — ASSESSMENT & PLAN NOTE
Chest pain-atypical for ACI   Troponin levels are negative   EKG non-ischemic     Resolved   No recurrence with ambulation this am   Difficulty ambulating due to injury to right lower extremity   Will arrange OP nuclear non-walking stress test to be sure there is no ischemic cause for symptoms

## 2021-02-22 NOTE — UTILIZATION REVIEW
Initial Clinical Review    Admission: Date/Time/Statement:   Admission Orders (From admission, onward)     Ordered        02/21/21 0813  Place in Observation  Once                   Orders Placed This Encounter   Procedures    Place in Observation     Standing Status:   Standing     Number of Occurrences:   1     Order Specific Question:   Level of Care     Answer:   Med Surg [16]     ED Arrival Information     Expected Arrival Acuity Means of Arrival Escorted By Service Admission Type    - 2/21/2021 07:03 Emergent [facility] Medicine Emergency    Arrival Complaint    chest pain        Chief Complaint   Patient presents with    Chest Pain     started last night, took 1 Nitro at 3am, pain subsided but returned at 5 am             HPI:   67 y o  male with PMH of CAD, HL,  COPD on home O2 @ 2L NC, and CHF who presents to the ED from home with chest pain  Yesterday in the afternoon he developed left-sided chest pain that is described as a tightness with radiation towards the right chest   He took two nitroglycerin and went to sleep  Pain persisted this morning and he took additional nitroglycerin, he is still having 2/10 chest pain  Plan: Admit to Observation for evaluation of chest pain:  Telemetry, trend cardiac enzymes, consult Cardiology  2/21 Cardiology consult:  Cardiac history is significant for CAD with multiple stents to the LAD and RCA, the last being placed in 2003  He has a history of a remote right carotid endarterectomy which remain patent on recent imaging and a known left carotid occlusion  He is treated for hypertension and hyperlipidemia  He follows with Pulmonary for COPD  He uses oxygen at night and daily during the day as needed  He states that over the past few months he has noted a decline in his breathing status  He is able to walk up the steps but needs to rest at the top and sometimes uses oxygen for recovery  Recommendation: Continue to trend troponin  If troponin levels rise, will evaluate need for catheterization vs  Inpatient stress test  Continue aspirin, statin and beta-blocker  ED Triage Vitals   Temperature Pulse Respirations Blood Pressure SpO2   02/21/21 0710 02/21/21 0710 02/21/21 0710 02/21/21 0710 02/21/21 0710   98 2 °F (36 8 °C) 77 20 131/64 92 %      Temp Source Heart Rate Source Patient Position - Orthostatic VS BP Location FiO2 (%)   02/21/21 0710 02/21/21 0710 02/21/21 0855 02/21/21 0855 --   Oral Monitor Lying Right arm       Pain Score       02/21/21 0918       2          Wt Readings from Last 1 Encounters:   02/21/21 98 kg (215 lb 15 1 oz)     Additional Vital Signs:     Date/Time  Temp  Pulse  Resp  BP  MAP (mmHg)  SpO2  Calculated FIO2 (%) - Nasal Cannula  Nasal Cannula O2 Flow Rate (L/min)  O2 Device    02/22/21 0810  --  64  --  151/64  --  --  --  --  --    02/22/21 0753  97 7 °F (36 5 °C)  64  18  151/62  --  94 %  --  --  CPAP    02/22/21 0300  97 9 °F (36 6 °C)  68  18  108/54  --  95 %  --  --  CPAP    02/21/21 2338  --  65  --  92/51  --  --  --  --  --    02/21/21 2300  97 5 °F (36 4 °C)  65  18  85/52  --  93 %  28  2 L/min  Nasal cannula    02/21/21 1900  97 2 °F (36 2 °C)Abnormal   76  18  117/56  --  90 %  --  --  None (Room air)    02/21/21 1441  97 4 °F (36 3 °C)Abnormal   68  18  90/51  --  96 %  32  3 L/min  Nasal cannula    02/21/21 0855  97 2 °F (36 2 °C)Abnormal   66  20  131/74  --  98 %  28  2 L/min  Nasal cannula            Pertinent Labs/Diagnostic Test Results:     2/21 EKG:     Normal sinus rhythm  Normal ECG  When compared with ECG of 21-FEB-2021 07:17, (unconfirmed)  No significant change was found    2/21 CXR:  Emphysematous changes are noted  No focal consolidation, pleural effusion, or pneumothorax      Results from last 7 days   Lab Units 02/21/21  0725   SARS-COV-2  Negative     Results from last 7 days   Lab Units 02/22/21  0502 02/21/21  0725   WBC Thousand/uL 9 50 10 60   HEMOGLOBIN g/dL 14 4 15 3 HEMATOCRIT % 42 9 46 0   PLATELETS Thousands/uL 166 178   NEUTROS ABS Thousands/µL  --  7 10*         Results from last 7 days   Lab Units 02/22/21  0502 02/21/21  0725   SODIUM mmol/L 140 141   POTASSIUM mmol/L 3 9 4 0   CHLORIDE mmol/L 101 99   CO2 mmol/L 29 34*   ANION GAP mmol/L 10 8   BUN mg/dL 15 18   CREATININE mg/dL 1 02 1 13   EGFR ml/min/1 73sq m 71 63   CALCIUM mg/dL 8 4* 8 6     Results from last 7 days   Lab Units 02/22/21  0502 02/21/21  0725   AST U/L 17 20   ALT U/L 16 20   ALK PHOS U/L 59 70   TOTAL PROTEIN g/dL 5 7* 6 2*   ALBUMIN g/dL 3 6 3 9   TOTAL BILIRUBIN mg/dL 0 60 0 60         Results from last 7 days   Lab Units 02/22/21  0502 02/21/21  0725   GLUCOSE RANDOM mg/dL 95 96       Results from last 7 days   Lab Units 02/21/21  1406 02/21/21  1138 02/21/21  0725   TROPONIN I ng/mL <0 03 <0 03 <0 03     Results from last 7 days   Lab Units 02/21/21  0725   D-DIMER QUANTITATIVE ug/ml FEU 0 58*     Results from last 7 days   Lab Units 02/21/21  0725   PROTIME seconds 13 1   INR  1 00   PTT seconds 26         Results from last 7 days   Lab Units 02/21/21  0725   BNP pg/mL 35         Results from last 7 days   Lab Units 02/21/21  0725   INFLUENZA A PCR  Negative   INFLUENZA B PCR  Negative   RSV PCR  Negative             ED Treatment:   Medication Administration from 02/21/2021 0703 to 02/21/2021 5245     None        Past Medical History:   Diagnosis Date    COPD [facility]chronic obstructive pulmonary disease) ([facility] )     Coronary artery disease     history of stenting    Hyperlipidemia     Hypertension     Sleep apnea      Present on Admission:   Chronic diastolic (congestive) heart failure (HCC)   COPD (chronic obstructive pulmonary disease) ([facility] )   Coronary artery disease   Chest pain in adult   Hypertension   Hyperlipidemia   Sleep apnea   Lumbar radiculopathy      Admitting Diagnosis: Chest pain [R07 9]  Chest pain in adult [R07 9]  Age/Sex: 67 y o  male         Admission Orders:      Telemetry, CPAP at HS  Scheduled Medications:      aspirin, 81 mg, Oral, Daily  atorvastatin, 40 mg, Oral, Daily With Dinner  azithromycin, 250 mg, Oral, Once per day on Mon Wed Fri  budesonide-formoterol, 2 puff, Inhalation, BID  docusate sodium, 100 mg, Oral, BID  famotidine, 20 mg, Oral, Daily  furosemide, 40 mg, Oral, Daily  heparin (porcine), 5,000 Units, Subcutaneous, Q8H SVETLANA  losartan, 25 mg, Oral, Daily  metoprolol succinate, 25 mg, Oral, Daily  potassium chloride, 20 mEq, Oral, Daily  tiotropium, 18 mcg, Inhalation, Daily  tiZANidine, 4 mg, Oral, TID      Continuous IV Infusions: None  PRN Meds:    acetaminophen, 650 mg, Oral, Q6H PRN  albuterol, 2 puff, Inhalation, Q4H PRN  morphine injection, 2 mg, Intravenous, Q4H PRN  nitroglycerin, 0 4 mg, Sublingual, Q5 Min PRN  ondansetron, 4 mg, Intravenous, Q6H PRN  oxyCODONE, 5 mg, Oral, Q4H PRN        IP CONSULT TO CARDIOLOGY               Network Utilization Review Department  ATTENTION: Please call with any questions or concerns to [phone] and carefully listen to the prompts so that you are directed to the right person  All voicemails are confidential   Lucy Perez all requests for admission clinical reviews, approved or denied determinations and any other requests to dedicated fax number below belonging to the campus where the patient is receiving treatment   List of dedicated fax numbers for the Facilities:  [address] DENIALS (Administrative/Medical Necessity) [phone]   [address] (Maternity/NICU/Pediatrics) [phone]   [address] [address] [facility] [address] (  [place] "[address]) [address] [facility] [address] [place]) [facility] [phone]

## 2021-02-22 NOTE — ASSESSMENT & PLAN NOTE
· Rosuvastatin was substituted with atorvastatin based on hospital formulary  · Resume crestor on discharge

## 2021-02-22 NOTE — CASE MANAGEMENT
Evaluated the pt at the bedside  Pt was admitted to the hospital for CP  Explained the role of CM and the options of discharge planning with the pt  Pt states he lives with his wife in a 2 story home with 3 SATNAM  The bedroom and bath are on the first floor  Pt indicated he is independent and drives  Pt has a CPAP he wears at HS  The CPAP is from the [place]  The only meds he gets from the [place] is Spiriva and Symbacort  Otherwise he utilizes [facility] in [place]  Pt PCP is Dr Yara Miller  Pt spouse will transport him home upon discharge  Pt has been evaluated by [facility] and is stable to be discharged  Patient/caregiver received discharge checklist   Content reviewed  Patient/caregiver encouraged to participate in discharge plan of care prior to discharge home  CM reviewed d/c planning process including the following: identifying help at home, patient preference for d/c planning needs, availability of treatment team to discuss questions or concerns patient and/or family may have regarding understanding medications and recognizing signs and symptoms once discharged  CM also encouraged patient to follow up with all recommended appointments after discharge  Patient advised of importance for patient and family to participate in managing patients medical well being

## 2021-02-22 NOTE — ASSESSMENT & PLAN NOTE
· Right lumbar radiculopathy appears musculoskeletal     · Much improved with tizanidine while in the hospital- will give PRN for outpatient  · Command to follow-up PCP if this is worsening or not resolved

## 2021-02-22 NOTE — DISCHARGE SUMMARY
Discharge- Karen Madden 1945, 67 y o  male MRN: [id number]    Unit/Bed#: -01 Encounter: [id number]    Primary Care Provider: Brandy Everett, DO   Date and time admitted to hospital: 2/21/2021  7:06 AM        * Chest pain in adult  Assessment & Plan  · Chest pain with history of CAD and stenting in [place] between 5113-3643  · Cardiology input appreciated  · Atypical for ACS, troponin levels are negative, no ischemic changes on EKG- chest pain is currently resolved  · Cardiology will arrange outpatient nuclear stress test      Results from last 7 days   Lab Units 02/21/21  1406 02/21/21  1138 02/21/21  0725   TROPONIN I ng/mL <0 03 <0 03 <0 03       Sleep apnea  Assessment & Plan  · CPAP on 10 cm H2O with 2 L oxygen at bedtime     Lumbar radiculopathy  Assessment & Plan  · Right lumbar radiculopathy appears musculoskeletal     · Much improved with tizanidine while in the hospital- will give PRN for outpatient  · Command to follow-up PCP if this is worsening or not resolved    Chronic diastolic (congestive) heart failure (HCC)  Assessment & Plan  Wt Readings from Last 3 Encounters:   02/21/21 98 kg (215 lb 15 1 oz)   02/04/21 98 kg (216 lb)   01/22/21 98 1 kg (216 lb 3 2 oz)     Results from last 7 days   Lab Units 02/21/21  0725   BNP pg/mL 35     · Chronic diastolic CHF compensated on furosemide and potassium supplement  · Last echocardiogram done on 12/11/2020 shows LVEF of 55%  Grade 1 diastolic dysfunction  COPD (chronic obstructive pulmonary disease) (HCC)  Assessment & Plan  · COPD without exacerbation  Continue Symbicort and Spiriva  · Chronic respiratory failure/hypoxia on 2 L unchanged     · The patient follows with pulmonologist outpatient- Dr Enmanuel Watson     Hypertension  Assessment & Plan  · Continue losartan and metoprolol    Coronary artery disease  Assessment & Plan  · Coronary artery disease with multiple history of stenting between 1995 in 2003 and [place]  · Does follow with Dr Mark Quiroz  · Continue aspirin and statin     Hyperlipidemia  Assessment & Plan  · Rosuvastatin was substituted with atorvastatin based on hospital formulary  · Resume crestor on discharge        Discharging Physician / Practitioner: Soraya Lora  PCP: Airam Hernandes DO  Admission Date:   Admission Orders (From admission, onward)     Ordered        02/21/21 0813  Place in Observation  Once                   Discharge Date: 02/22/21    Resolved Problems  Date Reviewed: 2/22/2021    None          Consultations During Hospital Stay:  · Cardiology     Procedures Performed:   · None    Significant Findings / Test Results:   · Chest x-ray shows emphysematous changes noted  No focal consolidations, pleural effusions, or pneumothorax  Incidental Findings:   · None      Test Results Pending at Discharge (will require follow up): · None      Outpatient Tests Requested:  · Follow up with PCP   · Follow up with cardiology     Complications:     None     Reason for Admission: chest pain     Hospital Course:     Iva Davidson is a 67 y o  male patient who originally presented to the hospital on 2/21/2021 due to chest pain  Please refer to H&P for initial presenting complaint  In brief the patient presented with worsening substernal chest pain approximately 2 days prior to admission  He took nitroglycerin with some improvement of the pain however this chest pain recur  On admission his chest pain was at 2/10 intensity  The patient subsequently was admitted for chest pain rule ACS due to his history of coronary disease with multiple stents placement in the past   Cardiology evaluation was done  At this time troponin levels x3 were negative without any ischemic changes on the EKG  Currently chest pain is resolved  Cardiology recommends a follow-up with an outpatient appointment and outpatient nuclear stress test   Overall clinically the patient is much improved    He was started on a trial of tizanidine for muscle spasms with much improvement  He will be discharged on p r n  Dosing  He is medically cleared to be discharged home  Please see above list of diagnoses and related plan for additional information  Condition at Discharge: good     Discharge Day Visit / Exam:     Subjective:  Feeling much improved  Would like to go home  No chest pain  Vitals: Blood Pressure: 151/64 (02/22/21 0810)  Pulse: 64 (02/22/21 0810)  Temperature: 97 7 °F (36 5 °C) (02/22/21 0753)  Temp Source: Temporal (02/22/21 0753)  Respirations: 18 (02/22/21 0753)  Height: 5' 8" (172 7 cm) (02/21/21 0855)  Weight - Scale: 98 kg (215 lb 15 1 oz) (02/21/21 0855)  SpO2: (S) (!) 86 %(Pt  is discharged  Wears 2 lpm PRN  Wife bringing O2 tank  ) (02/22/21 9520)  Exam:   Physical Exam  Vitals signs and nursing note reviewed  Constitutional:       General: He is not in acute distress  Appearance: Normal appearance  HENT:      Head: Normocephalic and atraumatic  Right Ear: External ear normal       Left Ear: External ear normal       Nose: Nose normal       Mouth/Throat:      Mouth: Mucous membranes are moist       Pharynx: Oropharynx is clear  Eyes:      General:         Right eye: No discharge  Left eye: No discharge  Extraocular Movements: Extraocular movements intact  Pupils: Pupils are equal, round, and reactive to light  Neck:      Musculoskeletal: Normal range of motion and neck supple  No neck rigidity  Cardiovascular:      Rate and Rhythm: Normal rate and regular rhythm  Pulses: Normal pulses  Heart sounds: Normal heart sounds  No murmur  Pulmonary:      Effort: Pulmonary effort is normal  No respiratory distress  Breath sounds: No wheezing or rales  Comments: Decreased breath sounds throughout  Abdominal:      General: Bowel sounds are normal  There is no distension  Palpations: Abdomen is soft  There is no mass  Tenderness:  There is no abdominal tenderness  Musculoskeletal: Normal range of motion  General: No swelling, tenderness or deformity  Skin:     General: Skin is warm and dry  Capillary Refill: Capillary refill takes less than 2 seconds  Coloration: Skin is not pale  Findings: No erythema  Neurological:      General: No focal deficit present  Mental Status: He is alert and oriented to person, place, and time  Mental status is at baseline  Psychiatric:         Mood and Affect: Mood normal          Behavior: Behavior normal          Thought Content: Thought content normal          Judgment: Judgment normal          Discussion with Family: none present during exam     Discharge instructions/Information to patient and family:   See after visit summary for information provided to patient and family  Provisions for Follow-Up Care:  See after visit summary for information related to follow-up care and any pertinent home health orders  Disposition:     Home      Planned Readmission:    No      Discharge Statement:  I spent 35 minutes discharging the patient  This time was spent on the day of discharge  I had direct contact with the patient on the day of discharge  Greater than 50% of the total time was spent examining patient, answering all patient questions, arranging and discussing plan of care with patient as well as directly providing post-discharge instructions  Additional time then spent on discharge activities  Discharge Medications:  See after visit summary for reconciled discharge medications provided to patient and family        ** Please Note: This note has been constructed using a voice recognition system **

## 2021-02-22 NOTE — DISCHARGE INSTR - AVS FIRST PAGE
Dear Halley Kirkpatrick,     It was our pleasure to care for you here at [facility]  It is our hope that we were always able to exceed the expected standards for your care during your stay  You were hospitalized due to chest pain  You were cared for on the 1st floor by INOCENTE Hanson with the [facility] who covers for your primary care physician (PCP), Devora Batista DO, while you were hospitalized  If you have any questions or concerns related to this hospitalization, you may contact us at [phone]  For follow up as well as any medication refills, we recommend that you follow up with your primary care physician  A registered nurse will reach out to you by phone within a few days after your discharge to answer any additional questions that you may have after going home  However, at this time we provide for you here, the most important instructions / recommendations at discharge:     · Notable Medication Adjustments -   · Tizanidine 4 mg oral every 8 hours as needed for muscle spasm   · Testing Required after Discharge -   · Follow up with cardiology for possible stress test   · Important follow up information -   · Follow up with cardiology   · Follow up with PCP   · Other Instructions -   · Please refer to discharge summary   · Please review this entire after visit summary as additional general instructions including medication list, appointments, activity, diet, any pertinent wound care, and other additional recommendations from your care team that may be provided for you        Sincerely,     INOCENTE Hanson

## 2021-02-22 NOTE — ASSESSMENT & PLAN NOTE
Wt Readings from Last 3 Encounters:   02/21/21 98 kg (215 lb 15 1 oz)   02/04/21 98 kg (216 lb)   01/22/21 98 1 kg (216 lb 3 2 oz)     Results from last 7 days   Lab Units 02/21/21  0725   BNP pg/mL 35     · Chronic diastolic CHF compensated on furosemide and potassium supplement  · Last echocardiogram done on 12/11/2020 shows LVEF of 55%  Grade 1 diastolic dysfunction

## 2021-02-22 NOTE — H&P (VIEW-ONLY)
Progress Note - Arnav Mays 1945, 67 y o  male MRN: [id number]    Unit/Bed#: -01 Encounter: [id number]    Primary Care Provider: Gina Prieto, DO   Date and time admitted to hospital: 2/21/2021  7:06 AM        Hypertension  Assessment & Plan  Moderate control of blood pressure  Continue current medications    Coronary artery disease  Assessment & Plan  Extensive cardiac history with a report of 10 prior stents  RCA stents 1999, 2001 with prior PTCA; LAD stenting in 2003  Possibly other procedures but records are not available  Continue aspirin, statin, beta-blocker    Hyperlipidemia  Assessment & Plan  Continue Lipitor while in hospital    May resume Crestor on discharge    * Chest pain in adult  Assessment & Plan  Chest pain-atypical for ACI   Troponin levels are negative   EKG non-ischemic     Resolved   No recurrence with ambulation this am   Difficulty ambulating due to injury to right lower extremity   Will arrange OP nuclear non-walking stress test to be sure there is no ischemic cause for symptoms  Subjective:  Patient seen and examined at the the bedside  Feeling better  No chest pain dyspnea, or palpitations  No edema  The patient is anxious to go home  He has an appointment with his dermatologist later today that he would like to keep  Objective:   Vitals: Blood pressure 151/64, pulse 64, temperature 97 7 °F (36 5 °C), temperature source Temporal, resp  rate 18, height 5' 8" (1 727 m), weight 98 kg (215 lb 15 1 oz), SpO2 94 % ,   Orthostatic Blood Pressures      Most Recent Value   Blood Pressure  151/64 filed at 02/22/2021 0810   Patient Position - Orthostatic VS  Lying filed at 02/22/2021 0001          Telemetry: controlled rate, sinus rhythm     Physical Exam:  Physical Exam   Constitutional: He is oriented to person, place, and time  He appears well-developed and well-nourished  HENT:   Head: Normocephalic and atraumatic     Mouth/Throat: Oropharynx is clear and moist    Eyes: Conjunctivae are normal  No scleral icterus  Neck: Normal range of motion  Neck supple  No JVD present  No thyromegaly present  Cardiovascular: Normal rate, regular rhythm, S1 normal, S2 normal and normal heart sounds  Exam reveals no gallop and no friction rub  No murmur heard  Pulmonary/Chest: No respiratory distress  He has no wheezes  He has no rales  Abdominal: Soft  Bowel sounds are normal  He exhibits no distension  There is no abdominal tenderness  Aorta not palpable   Musculoskeletal: Normal range of motion  General: No tenderness, deformity or edema  Neurological: He is alert and oriented to person, place, and time  Skin: Skin is warm and dry  Psychiatric: He has a normal mood and affect  His behavior is normal    Nursing note and vitals reviewed        Medications:    Current Facility-Administered Medications:     acetaminophen (TYLENOL) tablet 650 mg, 650 mg, Oral, Q6H PRN, Julien Henderson DO    albuterol (PROVENTIL HFA,VENTOLIN HFA) inhaler 2 puff, 2 puff, Inhalation, Q4H PRN, Madison Rdz DO    aspirin chewable tablet 81 mg, 81 mg, Oral, Daily, Julien Henderson DO, 81 mg at 02/22/21 0810    atorvastatin (LIPITOR) tablet 40 mg, 40 mg, Oral, Daily With Dinner, Julien Henderson DO, 40 mg at 02/21/21 1651    [facility]) tablet 250 mg, 250 mg, Oral, Once per day on Mon Wed Fri, Julien Henderson DO, 250 mg at 02/22/21 0810    budesonide-formoterol (SYMBICORT) 160-4 5 mcg/act inhaler 2 puff, 2 puff, Inhalation, BID, Madison Rdz DO, 2 puff at 02/22/21 0816    docusate sodium (COLACE) capsule 100 mg, 100 mg, Oral, BID, Cj Feldman PA-C, 100 mg at 02/22/21 0810    famotidine (PEPCID) tablet 20 mg, 20 mg, Oral, Daily, Julien Henderson DO, 20 mg at 02/22/21 0810    furosemide (LASIX) tablet 40 mg, 40 mg, Oral, Daily, Julein Henderson DO, 40 mg at 02/22/21 0811    heparin (porcine) subcutaneous injection 5,000 Units, 5,000 Units, Subcutaneous, Q8H [address], Marie Dooley DO, 5,000 Units at 02/22/21 0501    losartan (COZAAR) tablet 25 mg, 25 mg, Oral, Daily, Julien Henderson, DO, 25 mg at 02/22/21 5329    metoprolol succinate (TOPROL-XL) 24 hr tablet 25 mg, 25 mg, Oral, Daily, Julien Henderson, DO, 25 mg at 02/22/21 0810    morphine injection 2 mg, 2 mg, Intravenous, Q4H PRN, Marie Dooley DO    nitroglycerin (NITROSTAT) SL tablet 0 4 mg, 0 4 mg, Sublingual, Q5 Min PRN, Julien Henderson DO    ondansetron (ZOFRAN) injection 4 mg, 4 mg, Intravenous, Q6H PRN, Julien Henderson DO    oxyCODONE (ROXICODONE) IR tablet 5 mg, 5 mg, Oral, Q4H PRN, Marie Dooley DO    potassium chloride (K-DUR,KLOR-CON) CR tablet 20 mEq, 20 mEq, Oral, Daily, Julien Henderson, DO, 20 mEq at 02/22/21 4670    tiotropium (SPIRIVA) capsule for inhaler 18 mcg, 18 mcg, Inhalation, Daily, Julien Henderson, DO, 18 mcg at 02/22/21 0816    tiZANidine (ZANAFLEX) tablet 4 mg, 4 mg, Oral, TID, Julien Henderson, DO, 4 mg at 02/22/21 1516     Labs & Results:  Results from last 7 days   Lab Units 02/21/21  1406  02/21/21  0725   TROPONIN I ng/mL <0 03   < > <0 03   BNP pg/mL  --   --  35    < > = values in this interval not displayed       Results from last 7 days   Lab Units 02/22/21  0502   WBC Thousand/uL 9 50   HEMOGLOBIN g/dL 14 4   HEMATOCRIT % 42 9   PLATELETS Thousands/uL 166     Results from last 7 days   Lab Units 02/22/21  0502   CHOLESTEROL mg/dL 112   TRIGLYCERIDES mg/dL 94   HDL mg/dL 42   LDL CALC mg/dL 51     Results from last 7 days   Lab Units 02/22/21  0502   POTASSIUM mmol/L 3 9   CHLORIDE mmol/L 101   CO2 mmol/L 29   BUN mg/dL 15   CREATININE mg/dL 1 02     Results from last 7 days   Lab Units 02/21/21  0725   INR  1 00

## 2021-02-22 NOTE — ASSESSMENT & PLAN NOTE
· Coronary artery disease with multiple history of stenting between 1995 in 2003 and [place]  · Does follow with Dr Angelyn Snellen  · Continue aspirin and statin

## 2021-02-22 NOTE — PROGRESS NOTES
Progress Note - Yoni Grijalva 1945, 67 y o  male MRN: [id number]    Unit/Bed#: -01 Encounter: [id number]    Primary Care Provider: Stephanie Purdy DO   Date and time admitted to hospital: 2/21/2021  7:06 AM        Hypertension  Assessment & Plan  Moderate control of blood pressure  Continue current medications    Coronary artery disease  Assessment & Plan  Extensive cardiac history with a report of 10 prior stents  RCA stents 1999, 2001 with prior PTCA; LAD stenting in 2003  Possibly other procedures but records are not available  Continue aspirin, statin, beta-blocker    Hyperlipidemia  Assessment & Plan  Continue Lipitor while in hospital    May resume Crestor on discharge    * Chest pain in adult  Assessment & Plan  Chest pain-atypical for ACI   Troponin levels are negative   EKG non-ischemic     Resolved   No recurrence with ambulation this am   Difficulty ambulating due to injury to right lower extremity   Will arrange OP nuclear non-walking stress test to be sure there is no ischemic cause for symptoms  Subjective:  Patient seen and examined at the the bedside  Feeling better  No chest pain dyspnea, or palpitations  No edema  The patient is anxious to go home  He has an appointment with his dermatologist later today that he would like to keep  Objective:   Vitals: Blood pressure 151/64, pulse 64, temperature 97 7 °F (36 5 °C), temperature source Temporal, resp  rate 18, height 5' 8" (1 727 m), weight 98 kg (215 lb 15 1 oz), SpO2 94 % ,   Orthostatic Blood Pressures      Most Recent Value   Blood Pressure  151/64 filed at 02/22/2021 0810   Patient Position - Orthostatic VS  Lying filed at 02/22/2021 9207          Telemetry: controlled rate, sinus rhythm     Physical Exam:  Physical Exam   Constitutional: He is oriented to person, place, and time  He appears well-developed and well-nourished  HENT:   Head: Normocephalic and atraumatic     Mouth/Throat: Oropharynx is clear and moist    Eyes: Conjunctivae are normal  No scleral icterus  Neck: Normal range of motion  Neck supple  No JVD present  No thyromegaly present  Cardiovascular: Normal rate, regular rhythm, S1 normal, S2 normal and normal heart sounds  Exam reveals no gallop and no friction rub  No murmur heard  Pulmonary/Chest: No respiratory distress  He has no wheezes  He has no rales  Abdominal: Soft  Bowel sounds are normal  He exhibits no distension  There is no abdominal tenderness  Aorta not palpable   Musculoskeletal: Normal range of motion  General: No tenderness, deformity or edema  Neurological: He is alert and oriented to person, place, and time  Skin: Skin is warm and dry  Psychiatric: He has a normal mood and affect  His behavior is normal    Nursing note and vitals reviewed        Medications:    Current Facility-Administered Medications:     acetaminophen (TYLENOL) tablet 650 mg, 650 mg, Oral, Q6H PRN, Julien Henderson DO    albuterol (PROVENTIL HFA,VENTOLIN HFA) inhaler 2 puff, 2 puff, Inhalation, Q4H PRN, Anjana Begum DO    aspirin chewable tablet 81 mg, 81 mg, Oral, Daily, Julien Henderson DO, 81 mg at 02/22/21 0810    atorvastatin (LIPITOR) tablet 40 mg, 40 mg, Oral, Daily With Dinner, Julien Henderson DO, 40 mg at 02/21/21 1651    [facility]) tablet 250 mg, 250 mg, Oral, Once per day on Mon Wed Fri, Julien Henderson DO, 250 mg at 02/22/21 0810    budesonide-formoterol (SYMBICORT) 160-4 5 mcg/act inhaler 2 puff, 2 puff, Inhalation, BID, Anjana Begum DO, 2 puff at 02/22/21 0816    docusate sodium (COLACE) capsule 100 mg, 100 mg, Oral, BID, Chel French PA-C, 100 mg at 02/22/21 0810    famotidine (PEPCID) tablet 20 mg, 20 mg, Oral, Daily, Julien Henderson DO, 20 mg at 02/22/21 0810    furosemide (LASIX) tablet 40 mg, 40 mg, Oral, Daily, Julien Henderson DO, 40 mg at 02/22/21 0811    heparin (porcine) subcutaneous injection 5,000 Units, 5,000 Units, Subcutaneous, Q8H [address], Adrianne Degroot, DO, 5,000 Units at 02/22/21 0501    losartan (COZAAR) tablet 25 mg, 25 mg, Oral, Daily, Julien Henderson, DO, 25 mg at 02/22/21 0303    metoprolol succinate (TOPROL-XL) 24 hr tablet 25 mg, 25 mg, Oral, Daily, Julien Henderson, DO, 25 mg at 02/22/21 0810    morphine injection 2 mg, 2 mg, Intravenous, Q4H PRN, Adrianne Degroot, DO    nitroglycerin (NITROSTAT) SL tablet 0 4 mg, 0 4 mg, Sublingual, Q5 Min PRN, Julien Henderson, DO    ondansetron (ZOFRAN) injection 4 mg, 4 mg, Intravenous, Q6H PRN, Julien Henderson, DO    oxyCODONE (ROXICODONE) IR tablet 5 mg, 5 mg, Oral, Q4H PRN, Adrianne Degroot, DO    potassium chloride (K-DUR,KLOR-CON) CR tablet 20 mEq, 20 mEq, Oral, Daily, Julien Henderson, DO, 20 mEq at 02/22/21 6336    tiotropium (SPIRIVA) capsule for inhaler 18 mcg, 18 mcg, Inhalation, Daily, Julien Henderson, DO, 18 mcg at 02/22/21 0816    tiZANidine (ZANAFLEX) tablet 4 mg, 4 mg, Oral, TID, Julien Henderson, DO, 4 mg at 02/22/21 1364     Labs & Results:  Results from last 7 days   Lab Units 02/21/21  1406  02/21/21  0725   TROPONIN I ng/mL <0 03   < > <0 03   BNP pg/mL  --   --  35    < > = values in this interval not displayed       Results from last 7 days   Lab Units 02/22/21  0502   WBC Thousand/uL 9 50   HEMOGLOBIN g/dL 14 4   HEMATOCRIT % 42 9   PLATELETS Thousands/uL 166     Results from last 7 days   Lab Units 02/22/21  0502   CHOLESTEROL mg/dL 112   TRIGLYCERIDES mg/dL 94   HDL mg/dL 42   LDL CALC mg/dL 51     Results from last 7 days   Lab Units 02/22/21  0502   POTASSIUM mmol/L 3 9   CHLORIDE mmol/L 101   CO2 mmol/L 29   BUN mg/dL 15   CREATININE mg/dL 1 02     Results from last 7 days   Lab Units 02/21/21  0725   INR  1 00

## 2021-02-22 NOTE — NURSING NOTE
Vs by Kenneth Dang, discharged and discharge instructions given and verbalizes understanding of same

## 2021-02-22 NOTE — ASSESSMENT & PLAN NOTE
· COPD without exacerbation  Continue Symbicort and Spiriva  · Chronic respiratory failure/hypoxia on 2 L unchanged     · The patient follows with pulmonologist outpatient- Dr Brent Tovar

## 2021-02-22 NOTE — ASSESSMENT & PLAN NOTE
· Chest pain with history of CAD and stenting in [place] between 9029-5998  · Cardiology input appreciated  · Atypical for ACS, troponin levels are negative, no ischemic changes on EKG- chest pain is currently resolved  · Cardiology will arrange outpatient nuclear stress test      Results from last 7 days   Lab Units 02/21/21  1406 02/21/21  1138 02/21/21  0725   TROPONIN I ng/mL <0 03 <0 03 <0 03

## 2021-02-23 DIAGNOSIS — I25.10 CORONARY ARTERY DISEASE WITH CARDIAC SYMPTOMS: Primary | ICD-10-CM

## 2021-02-23 DIAGNOSIS — I25.10 CORONARY ARTERY DISEASE INVOLVING NATIVE CORONARY ARTERY OF NATIVE HEART, ANGINA PRESENCE UNSPECIFIED: ICD-10-CM

## 2021-02-23 DIAGNOSIS — R09.89 CORONARY ARTERY DISEASE WITH CARDIAC SYMPTOMS: Primary | ICD-10-CM

## 2021-02-23 LAB
ATRIAL RATE: 70 BPM
P AXIS: 65 DEGREES
PR INTERVAL: 176 MS
QRS AXIS: 65 DEGREES
QRSD INTERVAL: 94 MS
QT INTERVAL: 412 MS
QTC INTERVAL: 444 MS
T WAVE AXIS: 70 DEGREES
VENTRICULAR RATE: 70 BPM

## 2021-02-23 PROCEDURE — 93010 ELECTROCARDIOGRAM REPORT: CPT | Performed by: INTERNAL MEDICINE

## 2021-02-23 NOTE — PROGRESS NOTES
----- Message -----   From: Tunde Padilla PA-C   Sent: 2/22/2021   8:43 AM EST   To:  [facility]     Patient will need follow up and a nuclear stress test   Thank you

## 2021-02-24 NOTE — UTILIZATION REVIEW
Notification of Discharge  This is a Notification of Discharge from our facility [address]  Please be advised that this patient has been discharge from our facility  Below you will find the admission and discharge date and time including the patients disposition  PRESENTATION DATE: 2/21/2021  7:06 AM  OBS ADMISSION DATE: 02/21/2021  8:13   IP ADMISSION DATE: N/A N/A   DISCHARGE DATE: 2/22/2021 10:00 AM  DISPOSITION: Home/Self Care Home/Self Care   Admission Orders listed below:  Admission Orders (From admission, onward)     Ordered        02/21/21 0813  Place in Observation  Once                   Please contact the UR Department if additional information is required to close this patient's authorization/case  [facility] Utilization Review Department  Main: [phone] x carefully listen to the prompts  All voicemails are confidential   [email or web address]  Send all requests for admission clinical reviews, approved or denied determinations and any other requests to dedicated fax number below belonging to the campus where the patient is receiving treatment   List of dedicated fax numbers:  [address] DENIALS (Administrative/Medical Necessity) [phone]   [address] (Maternity/NICU/Pediatrics) [phone] [facility] [phone]     [address]  [phone]   Rayo Salas [phone]   Deirdre Meyer [address] [phone]   [facility]  [phone]   [address] [facility] [phone]

## 2021-03-04 ENCOUNTER — HOSPITAL ENCOUNTER (OUTPATIENT)
Dept: NUCLEAR MEDICINE | Facility: HOSPITAL | Age: 76
Discharge: HOME | End: 2021-03-04
Payer: COMMERCIAL

## 2021-03-04 ENCOUNTER — HOSPITAL ENCOUNTER (OUTPATIENT)
Dept: NON INVASIVE DIAGNOSTICS | Facility: HOSPITAL | Age: 76
Discharge: HOME | End: 2021-03-04
Payer: COMMERCIAL

## 2021-03-04 DIAGNOSIS — I25.10 CORONARY ARTERY DISEASE INVOLVING NATIVE CORONARY ARTERY OF NATIVE HEART WITHOUT ANGINA PECTORIS: Primary | ICD-10-CM

## 2021-03-04 DIAGNOSIS — I50.32 CHRONIC DIASTOLIC (CONGESTIVE) HEART FAILURE (HCC): ICD-10-CM

## 2021-03-04 DIAGNOSIS — R94.39 ABNORMAL NUCLEAR STRESS TEST: ICD-10-CM

## 2021-03-04 DIAGNOSIS — I25.10 CORONARY ARTERY DISEASE INVOLVING NATIVE CORONARY ARTERY OF NATIVE HEART, ANGINA PRESENCE UNSPECIFIED: ICD-10-CM

## 2021-03-04 PROCEDURE — A9502 TC99M TETROFOSMIN: HCPCS

## 2021-03-04 PROCEDURE — 78452 HT MUSCLE IMAGE SPECT MULT: CPT | Performed by: INTERNAL MEDICINE

## 2021-03-04 PROCEDURE — 93017 CV STRESS TEST TRACING ONLY: CPT

## 2021-03-04 PROCEDURE — 93018 CV STRESS TEST I&R ONLY: CPT | Performed by: INTERNAL MEDICINE

## 2021-03-04 PROCEDURE — 93016 CV STRESS TEST SUPVJ ONLY: CPT | Performed by: INTERNAL MEDICINE

## 2021-03-04 PROCEDURE — G1004 CDSM NDSC: HCPCS

## 2021-03-04 PROCEDURE — 78452 HT MUSCLE IMAGE SPECT MULT: CPT

## 2021-03-04 RX ADMIN — REGADENOSON 0.4 MG: 0.08 INJECTION, SOLUTION INTRAVENOUS at 09:50

## 2021-03-05 LAB
CHEST PAIN STATEMENT: NORMAL
MAX DIASTOLIC BP: 64 MMHG
MAX HEART RATE: 93 BPM
MAX PREDICTED HEART RATE: 144 BPM
MAX. SYSTOLIC BP: 122 MMHG
PROTOCOL NAME: NORMAL
REASON FOR TERMINATION: NORMAL
TARGET HR FORMULA: NORMAL
TEST INDICATION: NORMAL
TIME IN EXERCISE PHASE: NORMAL

## 2021-03-08 ENCOUNTER — TELEPHONE (OUTPATIENT)
Dept: CARDIOLOGY CLINIC | Facility: CLINIC | Age: 76
End: 2021-03-08

## 2021-03-08 NOTE — TELEPHONE ENCOUNTER
PT is scheduled on 03/18/21 for a LHC at [facility] with Dr Checo Rice  PT aware of instructions  Spoke with pt over the phone  Can I have preauth please

## 2021-03-17 ENCOUNTER — TELEPHONE (OUTPATIENT)
Dept: SURGERY | Facility: HOSPITAL | Age: 76
End: 2021-03-17

## 2021-03-17 RX ORDER — SODIUM CHLORIDE 9 MG/ML
125 INJECTION, SOLUTION INTRAVENOUS CONTINUOUS
Status: CANCELLED | OUTPATIENT
Start: 2021-03-17

## 2021-03-17 RX ORDER — ASPIRIN 81 MG/1
324 TABLET, CHEWABLE ORAL ONCE
Status: CANCELLED | OUTPATIENT
Start: 2021-03-17 | End: 2021-03-17

## 2021-03-18 ENCOUNTER — HOSPITAL ENCOUNTER (OUTPATIENT)
Dept: NON INVASIVE DIAGNOSTICS | Facility: HOSPITAL | Age: 76
Discharge: HOME | End: 2021-03-18
Attending: INTERNAL MEDICINE | Admitting: INTERNAL MEDICINE
Payer: COMMERCIAL

## 2021-03-18 VITALS
WEIGHT: 215 LBS | HEART RATE: 68 BPM | TEMPERATURE: 97.5 F | DIASTOLIC BLOOD PRESSURE: 53 MMHG | HEIGHT: 68 IN | OXYGEN SATURATION: 96 % | BODY MASS INDEX: 32.58 KG/M2 | SYSTOLIC BLOOD PRESSURE: 107 MMHG | RESPIRATION RATE: 18 BRPM

## 2021-03-18 DIAGNOSIS — R94.39 ABNORMAL NUCLEAR STRESS TEST: ICD-10-CM

## 2021-03-18 DIAGNOSIS — I25.10 CORONARY ARTERY DISEASE INVOLVING NATIVE CORONARY ARTERY OF NATIVE HEART WITHOUT ANGINA PECTORIS: ICD-10-CM

## 2021-03-18 LAB
ANION GAP SERPL CALCULATED.3IONS-SCNC: 5 MMOL/L (ref 4–13)
ATRIAL RATE: 80 BPM
BUN SERPL-MCNC: 19 MG/DL (ref 5–25)
CALCIUM SERPL-MCNC: 9.1 MG/DL (ref 8.3–10.1)
CHLORIDE SERPL-SCNC: 107 MMOL/L (ref 100–108)
CO2 SERPL-SCNC: 29 MMOL/L (ref 21–32)
CREAT SERPL-MCNC: 1.2 MG/DL (ref 0.6–1.3)
GFR SERPL CREATININE-BSD FRML MDRD: 58 ML/MIN/1.73SQ M
GLUCOSE P FAST SERPL-MCNC: 103 MG/DL (ref 65–99)
GLUCOSE SERPL-MCNC: 103 MG/DL (ref 65–140)
P AXIS: 82 DEGREES
POTASSIUM SERPL-SCNC: 3.6 MMOL/L (ref 3.5–5.3)
PR INTERVAL: 168 MS
QRS AXIS: 74 DEGREES
QRSD INTERVAL: 90 MS
QT INTERVAL: 370 MS
QTC INTERVAL: 426 MS
SODIUM SERPL-SCNC: 141 MMOL/L (ref 136–145)
T WAVE AXIS: 37 DEGREES
VENTRICULAR RATE: 80 BPM

## 2021-03-18 PROCEDURE — 93010 ELECTROCARDIOGRAM REPORT: CPT | Performed by: INTERNAL MEDICINE

## 2021-03-18 PROCEDURE — C1769 GUIDE WIRE: HCPCS | Performed by: PHYSICIAN ASSISTANT

## 2021-03-18 PROCEDURE — 80048 BASIC METABOLIC PNL TOTAL CA: CPT | Performed by: INTERNAL MEDICINE

## 2021-03-18 PROCEDURE — C1894 INTRO/SHEATH, NON-LASER: HCPCS | Performed by: PHYSICIAN ASSISTANT

## 2021-03-18 PROCEDURE — 99152 MOD SED SAME PHYS/QHP 5/>YRS: CPT | Performed by: PHYSICIAN ASSISTANT

## 2021-03-18 PROCEDURE — 93458 L HRT ARTERY/VENTRICLE ANGIO: CPT | Performed by: INTERNAL MEDICINE

## 2021-03-18 PROCEDURE — 93005 ELECTROCARDIOGRAM TRACING: CPT

## 2021-03-18 PROCEDURE — 93458 L HRT ARTERY/VENTRICLE ANGIO: CPT | Performed by: PHYSICIAN ASSISTANT

## 2021-03-18 PROCEDURE — 99153 MOD SED SAME PHYS/QHP EA: CPT | Performed by: PHYSICIAN ASSISTANT

## 2021-03-18 PROCEDURE — 99152 MOD SED SAME PHYS/QHP 5/>YRS: CPT | Performed by: INTERNAL MEDICINE

## 2021-03-18 RX ORDER — SODIUM CHLORIDE 9 MG/ML
125 INJECTION, SOLUTION INTRAVENOUS CONTINUOUS
Status: DISCONTINUED | OUTPATIENT
Start: 2021-03-18 | End: 2021-03-18

## 2021-03-18 RX ORDER — ACETAMINOPHEN 325 MG/1
650 TABLET ORAL EVERY 4 HOURS PRN
Status: DISCONTINUED | OUTPATIENT
Start: 2021-03-18 | End: 2021-03-18 | Stop reason: HOSPADM

## 2021-03-18 RX ORDER — SODIUM CHLORIDE 9 MG/ML
150 INJECTION, SOLUTION INTRAVENOUS CONTINUOUS
Status: DISPENSED | OUTPATIENT
Start: 2021-03-18 | End: 2021-03-18

## 2021-03-18 RX ORDER — VERAPAMIL HYDROCHLORIDE 2.5 MG/ML
INJECTION, SOLUTION INTRAVENOUS CODE/TRAUMA/SEDATION MEDICATION
Status: DISCONTINUED | OUTPATIENT
Start: 2021-03-18 | End: 2021-03-18 | Stop reason: HOSPADM

## 2021-03-18 RX ORDER — ONDANSETRON 2 MG/ML
4 INJECTION INTRAMUSCULAR; INTRAVENOUS EVERY 6 HOURS PRN
Status: DISCONTINUED | OUTPATIENT
Start: 2021-03-18 | End: 2021-03-18 | Stop reason: HOSPADM

## 2021-03-18 RX ORDER — NITROGLYCERIN 20 MG/100ML
INJECTION INTRAVENOUS CODE/TRAUMA/SEDATION MEDICATION
Status: DISCONTINUED | OUTPATIENT
Start: 2021-03-18 | End: 2021-03-18 | Stop reason: HOSPADM

## 2021-03-18 RX ORDER — FENTANYL CITRATE 50 UG/ML
INJECTION, SOLUTION INTRAMUSCULAR; INTRAVENOUS CODE/TRAUMA/SEDATION MEDICATION
Status: DISCONTINUED | OUTPATIENT
Start: 2021-03-18 | End: 2021-03-18 | Stop reason: HOSPADM

## 2021-03-18 RX ORDER — HEPARIN SODIUM 1000 [USP'U]/ML
INJECTION, SOLUTION INTRAVENOUS; SUBCUTANEOUS CODE/TRAUMA/SEDATION MEDICATION
Status: DISCONTINUED | OUTPATIENT
Start: 2021-03-18 | End: 2021-03-18 | Stop reason: HOSPADM

## 2021-03-18 RX ORDER — LIDOCAINE HYDROCHLORIDE 10 MG/ML
INJECTION, SOLUTION EPIDURAL; INFILTRATION; INTRACAUDAL; PERINEURAL CODE/TRAUMA/SEDATION MEDICATION
Status: DISCONTINUED | OUTPATIENT
Start: 2021-03-18 | End: 2021-03-18 | Stop reason: HOSPADM

## 2021-03-18 RX ORDER — MIDAZOLAM HYDROCHLORIDE 2 MG/2ML
INJECTION, SOLUTION INTRAMUSCULAR; INTRAVENOUS CODE/TRAUMA/SEDATION MEDICATION
Status: DISCONTINUED | OUTPATIENT
Start: 2021-03-18 | End: 2021-03-18 | Stop reason: HOSPADM

## 2021-03-18 RX ORDER — ASPIRIN 81 MG/1
324 TABLET, CHEWABLE ORAL ONCE
Status: COMPLETED | OUTPATIENT
Start: 2021-03-18 | End: 2021-03-18

## 2021-03-18 RX ADMIN — LIDOCAINE HYDROCHLORIDE 1 ML: 10 INJECTION, SOLUTION EPIDURAL; INFILTRATION; INTRACAUDAL; PERINEURAL at 08:59

## 2021-03-18 RX ADMIN — SODIUM CHLORIDE 125 ML/HR: 0.9 INJECTION, SOLUTION INTRAVENOUS at 07:23

## 2021-03-18 RX ADMIN — MIDAZOLAM 2 MG: 1 INJECTION INTRAMUSCULAR; INTRAVENOUS at 08:57

## 2021-03-18 RX ADMIN — VERAPAMIL HYDROCHLORIDE 1.25 MG: 2.5 INJECTION, SOLUTION INTRAVENOUS at 09:06

## 2021-03-18 RX ADMIN — Medication 200 MCG: at 09:06

## 2021-03-18 RX ADMIN — ASPIRIN 324 MG: 81 TABLET, CHEWABLE ORAL at 07:24

## 2021-03-18 RX ADMIN — IOHEXOL 40 ML: 350 INJECTION, SOLUTION INTRAVENOUS at 09:13

## 2021-03-18 RX ADMIN — HEPARIN SODIUM 4000 UNITS: 1000 INJECTION INTRAVENOUS; SUBCUTANEOUS at 09:06

## 2021-03-18 RX ADMIN — FENTANYL CITRATE 50 MCG: 50 INJECTION INTRAMUSCULAR; INTRAVENOUS at 08:57

## 2021-03-18 NOTE — DISCHARGE INSTRUCTIONS
1  Please see the post cardiac catheterization dishcarge instructions  No heavy lifting, greater than 10 lbs  or strenuous  activity for 48 hrs  2 Remove band aid tomorrow  Shower and wash area- wrist gently with soap and water- beginning tomorrow  Rinse and pat dry  Apply new water seal band aid  Repeat this process for 5 days  No powders, creams lotions or antibiotic ointments  for 5 days  No tub baths, hot tubs or swimming for 5 days  3  Please call our office ([phone]) if you have any fever, redness, swelling, discharge from your wrist access site  4 No driving for 1 day    =================================================================================================================================================================    Left Heart Catheterization   WHAT YOU NEED TO KNOW:   A left heart catheterization is a procedure to look at your heart and its arteries  You may need this procedure if you have chest pain, heart disease, or your heart is not working as it should  DISCHARGE INSTRUCTIONS:   Call 911 for any of the following:   · You have any of the following signs of a heart attack:      ? Squeezing, pressure, or pain in your chest     ? and  any of the following:     ? Discomfort or pain in your back, neck, jaw, stomach, or arm     ? Shortness of breath    ? Nausea or vomiting    ? Lightheadedness or a sudden cold sweat    · You have any of the following signs of a stroke:      ? Numbness or drooping on one side of your face     ? Weakness in an arm or leg    ? Confusion or difficulty speaking    ? Dizziness, a severe headache, or vision loss    Seek care immediately if:   · Your arm or leg feels warm, tender, and painful  It may look swollen and red  · The leg or arm used for your angiogram is numb, painful, or changes color  · The bruise at your catheter site gets bigger or becomes swollen       · Your wound does not stop bleeding even after you apply firm pressure for 15 minutes  · You have weakness in an arm or leg  · You become confused or have difficulty speaking  · You have dizziness, a severe headache, or vision loss  Contact your healthcare provider if:   · You have a fever  · Your catheter site is red, leaks pus, or smells bad  · You have increasing pain at your catheter site  · You have questions or concerns about your condition or care  Limit activity as directed:   · Avoid unnecessary stair climbing for 48 hours, if a catheter was put in your groin  · Do not place pressure on your arm, hand, or wrist, if the catheter was placed in your wrist  Avoid pushing, pulling, or heavy lifting with that arm  · If you need to cough, support the area where the catheter was inserted with your hand  · Ask your healthcare provider how long you need to limit movement and avoid certain activities  · You may feel like resting more after your procedure  Slowly start to do more each day  Rest when you feel it is needed  Keep your bandage clean and dry:  Ask your healthcare provider when you can bathe and shower  Do not take baths or go in pools or hot tubs  Check your site every day for signs of infection such as swelling, redness, or pus  Drink liquids as directed:  Liquids help flush the dye used for your procedure out of your body  Ask your healthcare provider how much liquid to drink each day, and which liquids to drink  Some foods, such as soup and fruit, also provide liquid  Limit alcohol:  Do not drink alcohol for 24 hours after your procedure  Then limit alcohol  Women should limit alcohol to 1 drink a day  Men should limit alcohol to 2 drinks a day  A drink of alcohol is 12 ounces of beer, 5 ounces of wine, or 1½ ounces of liquor  Do not smoke:  Nicotine and other chemicals in cigarettes and cigars can damage your blood vessels  Ask your healthcare provider for information if you currently smoke and need help to quit   E-cigarettes or smokeless tobacco still contain nicotine  Talk to your healthcare provider before you use these products  Follow up with your healthcare provider as directed:  Write down your questions so you remember to ask them during your visits  © Copyright [employer] 2018 Information is for End User's use only and may not be sold, redistributed or otherwise used for commercial purposes  All illustrations and images included in CareNotes® are the copyrighted property of A D A M [employer] Inc  or Yamilka Gomez  The above information is an  only  It is not intended as medical advice for individual conditions or treatments  Talk to your doctor, nurse or pharmacist before following any medical regimen to see if it is safe and effective for you

## 2021-03-18 NOTE — INTERVAL H&P NOTE
H&P reviewed  After examining the patient, I find no changed to the H&P since it had been written  /55 (BP Location: Right arm)   Pulse 77   Temp 97 5 °F (36 4 °C) (Oral)   Resp 18   Ht 5' 8" (1 727 m)   Wt 97 5 kg (215 lb)   SpO2 92%   BMI 32 69 kg/m²     Patient re-evaluated   Accept as history and physical     Prabhakar Mehta MD

## 2021-03-19 ENCOUNTER — OFFICE VISIT (OUTPATIENT)
Dept: FAMILY MEDICINE CLINIC | Facility: CLINIC | Age: 76
End: 2021-03-19
Payer: COMMERCIAL

## 2021-03-19 ENCOUNTER — CLINICAL SUPPORT (OUTPATIENT)
Dept: URGENT CARE | Facility: CLINIC | Age: 76
End: 2021-03-19
Payer: COMMERCIAL

## 2021-03-19 VITALS
RESPIRATION RATE: 18 BRPM | WEIGHT: 218.6 LBS | BODY MASS INDEX: 33.13 KG/M2 | TEMPERATURE: 98.5 F | SYSTOLIC BLOOD PRESSURE: 108 MMHG | HEIGHT: 68 IN | OXYGEN SATURATION: 93 % | HEART RATE: 89 BPM | DIASTOLIC BLOOD PRESSURE: 62 MMHG

## 2021-03-19 DIAGNOSIS — I10 ESSENTIAL HYPERTENSION: ICD-10-CM

## 2021-03-19 DIAGNOSIS — Z00.00 MEDICARE ANNUAL WELLNESS VISIT, SUBSEQUENT: Primary | ICD-10-CM

## 2021-03-19 DIAGNOSIS — I25.10 CORONARY ARTERY DISEASE INVOLVING NATIVE CORONARY ARTERY OF NATIVE HEART WITHOUT ANGINA PECTORIS: ICD-10-CM

## 2021-03-19 DIAGNOSIS — R94.31 ABNORMAL EKG: Primary | ICD-10-CM

## 2021-03-19 PROCEDURE — 3074F SYST BP LT 130 MM HG: CPT | Performed by: FAMILY MEDICINE

## 2021-03-19 PROCEDURE — 1170F FXNL STATUS ASSESSED: CPT | Performed by: FAMILY MEDICINE

## 2021-03-19 PROCEDURE — 3078F DIAST BP <80 MM HG: CPT | Performed by: FAMILY MEDICINE

## 2021-03-19 PROCEDURE — 3288F FALL RISK ASSESSMENT DOCD: CPT | Performed by: FAMILY MEDICINE

## 2021-03-19 PROCEDURE — 3725F SCREEN DEPRESSION PERFORMED: CPT | Performed by: FAMILY MEDICINE

## 2021-03-19 PROCEDURE — 93005 ELECTROCARDIOGRAM TRACING: CPT

## 2021-03-19 PROCEDURE — 1125F AMNT PAIN NOTED PAIN PRSNT: CPT | Performed by: FAMILY MEDICINE

## 2021-03-19 PROCEDURE — 93005 ELECTROCARDIOGRAM TRACING: CPT | Performed by: INTERNAL MEDICINE

## 2021-03-19 PROCEDURE — G0439 PPPS, SUBSEQ VISIT: HCPCS | Performed by: FAMILY MEDICINE

## 2021-03-19 NOTE — PATIENT INSTRUCTIONS

## 2021-03-19 NOTE — PROGRESS NOTES
Assessment and Plan:     Problem List Items Addressed This Visit        Cardiovascular and Mediastinum    Coronary artery disease involving native coronary artery of native heart without angina pectoris    Hypertension    Relevant Orders    ECG 12 lead      Other Visit Diagnoses     Medicare annual wellness visit, subsequent    -  Primary        BMI Counseling: Body mass index is 33 24 kg/m²  The BMI is above normal  Nutrition recommendations include decreasing portion sizes, encouraging healthy choices of fruits and vegetables, decreasing fast food intake, consuming healthier snacks, moderation in carbohydrate intake and increasing intake of lean protein  Exercise recommendations include exercising 3-5 times per week  Preventive health issues were discussed with patient, and age appropriate screening tests were ordered as noted in patient's After Visit Summary  Personalized health advice and appropriate referrals for health education or preventive services given if needed, as noted in patient's After Visit Summary  History of Present Illness:     Patient presents for Medicare Annual Wellness visit  Yesterday had a left heart catherization, which showed no significant CAD requiring revascularization  He had a recent hospitalization for chest pain, recommended stress testing which was abnormal which then lead to cath  No further chest pain since  Follow-up with cardiology  Hypertension- /62, no lightheadedness  Readings at home- 106/55  Metoprolol succinate 25 mg daily (although might be taking twice per day, not sure ) If taking once a day, discontinue losartan 25 mg daily  If still taking metoprolol BID decrease to daily  Every now and then palpitations/fluttering sensation for 2-3 months  Regular rhythm with intermittent couple pauses  Will obtain EKG  Breathing is slowly worsening over time, severe COPD following with Dr Kike Lund pulmonology       EMG results reviewed, patient plans on following up with orthopedic surgery       Patient Care Team:  Dieudonne Thomas DO as PCP - General (Family Medicine)  Dieudonne Thomas DO as PCP - [address] [place], [place] [address]Th [facility]     Problem List:     Patient Active Problem List   Diagnosis    Hyperlipidemia    Coronary artery disease involving native coronary artery of native heart without angina pectoris    Bilateral carotid artery stenosis    Hypertension    Oxygen dependent    Depression    [facility]    S/P carotid endarterectomy    Stented coronary artery    Vertigo    Anisometropia    Osteoarthritis of spinal facet joint    Chronic hypokalemia    Chronic ischemic heart disease    Chronic diastolic (congestive) heart failure (HCC)    Diverticular disease of colon    Dry eye syndrome    Gastroesophageal reflux disease    Hearing loss    High prostate specific antigen (PSA)    Hyperglycemia    Hypoxemia    Lens replaced by other means    Lumbar radiculopathy    Multinodular goiter    Nuclear senile cataract    Obesity    Occlusion and stenosis of carotid artery    Osteoarthritis of hip    Prediabetes    Psychosexual dysfunction with inhibited sexual excitement    Sleep apnea    Solitary pulmonary nodule    Thyroid nodule    Visual disturbance    Generalized abdominal pain    Cubital tunnel syndrome on left    Costochondral chest pain    Chest pain in adult    Abnormal nuclear stress test      Past Medical and Surgical History:     Past Medical History:   Diagnosis Date    COPD [facility]chronic obstructive pulmonary disease) [facility]     Coronary artery disease     history of stenting    Hyperlipidemia     Hypertension     Sleep apnea      Past Surgical History:   Procedure Laterality Date    COLONOSCOPY        Family History:     Family History   Problem Relation Age of Onset    Heart attack Mother     Dementia Mother       Social History:     E-Cigarette/Vaping    E-Cigarette Use Never User      E-Cigarette/Vaping Substances    Nicotine No     THC No     CBD No     Flavoring No     Other No     Unknown No      Social History     Socioeconomic History    Marital status: /Civil Union     Spouse name: None    Number of children: None    Years of education: None    Highest education level: None   Occupational History    None   Social Needs    Financial resource strain: None    Food insecurity     Worry: None     Inability: None    Transportation needs     Medical: None     Non-medical: None   Tobacco Use    Smoking status: Former Smoker     Quit date:      Years since quittin 2    Smokeless tobacco: Never Used   Substance and Sexual Activity    Alcohol use: Never     Frequency: Never    Drug use: Never    Sexual activity: None   Lifestyle    Physical activity     Days per week: None     Minutes per session: None    Stress: None   Relationships    Social connections     Talks on phone: None     Gets together: None     Attends Alevism service: None     Active member of club or organization: None     Attends meetings of clubs or organizations: None     Relationship status: None    Intimate partner violence     Fear of current or ex partner: None     Emotionally abused: None     Physically abused: None     Forced sexual activity: None   Other Topics Concern    None   Social History Narrative    None      Medications and Allergies:     Current Outpatient Medications   Medication Sig Dispense Refill    albuterol (5 mg/mL) 0 5 % nebulizer solution 2 5 mg every 6 (six) hours as needed       Ascorbic Acid (vitamin C) 1000 MG tablet Take 1,000 mg by mouth daily      aspirin 81 mg chewable tablet CHEW ONE TABLET BY MOUTH EVERY DAY      azithromycin (ZITHROMAX) 250 mg tablet Take 1 every Monday, Wednesday and Friday   45 tablet 1    budesonide-formoterol (SYMBICORT) 160-4 5 mcg/act inhaler INHALE TWO INHALATIONS BY MOUTH TWICE A DAY - RINSE MOUTH AFTER USE  famotidine (PEPCID) 20 mg tablet Take 1 tablet (20 mg total) by mouth daily 100 tablet 1    fluticasone (FLONASE) 50 mcg/act nasal spray into each nostril as needed       furosemide (LASIX) 40 mg tablet Take 40 mg by mouth daily      losartan (COZAAR) 25 mg tablet Take 1 tablet (25 mg total) by mouth daily 100 tablet 1    metoprolol succinate (TOPROL-XL) 25 mg 24 hr tablet Take 1 tablet (25 mg total) by mouth daily 100 tablet 1    Multiple Vitamin (multivitamin) tablet Take 1 tablet by mouth daily      nitroglycerin (NITROSTAT) 0 4 mg SL tablet DISSOLVE ONE TABLET UNDER THE TONGUE  PRN      potassium chloride (Klor-Con) 10 mEq tablet Take 20 mEq by mouth daily      rosuvastatin (CRESTOR) 40 MG tablet Take 1 tablet (40 mg total) by mouth daily 100 tablet 1    tiotropium (SPIRIVA) 18 mcg inhalation capsule INHALE CONTENTS OF ONE CAPSULE BY MOUTH ONCE DAILY USING HANDIHALER DEVICE      tiZANidine (ZANAFLEX) 4 mg tablet Take 1 tablet (4 mg total) by mouth every 8 (eight) hours as needed for muscle spasms (Patient not taking: Reported on 3/19/2021) 10 tablet 0     No current facility-administered medications for this visit  Allergies   Allergen Reactions    Lisinopril Swelling and Cough    Tetanus Antitoxin Anaphylaxis    Tetanus Toxoid Anaphylaxis and Swelling    Pneumococcal Vaccine Swelling     Patient states he is not allergic  Immunizations:     Immunization History   Administered Date(s) Administered    INFLUENZA 09/24/2014, 11/30/2015, 12/01/2015, 11/06/2017, 11/10/2017, 09/19/2018, 09/11/2019, 10/01/2020, 10/27/2020    Influenza Quadrivalent 3 years and older 10/13/2016    Pneumococcal Conjugate 13-Valent 11/30/2018    SARS-CoV-2 / COVID-19 mRNA IM ([facility]) 02/16/2021    Zoster 03/10/2016    influenza, injectable, quadrivalent 10/13/2016      Health Maintenance: There are no preventive care reminders to display for this patient        Topic Date Due    DTaP,Tdap,and Td Vaccines (1 - Tdap) Never done    Pneumococcal Vaccine: 65+ Years (2 of 2 - PPSV23) 11/30/2019    COVID-19 Vaccine (2 - Moderna 2-dose series) 03/16/2021      Medicare Health Risk Assessment:     /62   Pulse 89   Temp 98 5 °F (36 9 °C)   Resp 18   Ht 5' 8" (1 727 m)   Wt 99 2 kg (218 lb 9 6 oz)   SpO2 93%   BMI 33 24 kg/m²      Physical Exam   Constitutional: He appears well-developed and well-nourished  No distress  HENT:   Head: Normocephalic and atraumatic  Eyes: Conjunctivae and EOM are normal  Right eye exhibits no discharge  Left eye exhibits no discharge  Neck: Normal range of motion  Neck supple  Cardiovascular: Normal rate, regular rhythm and normal heart sounds  Exam reveals no gallop and no friction rub  No murmur heard  Pulmonary/Chest: Effort normal and breath sounds normal  No respiratory distress  He has no wheezes  He has no rales  Diminished breath sounds at bases   Skin: Skin is warm  No rash noted  He is not diaphoretic  Psychiatric: He has a normal mood and affect  His behavior is normal    Vitals reviewed  Milana Cushing is here for his Subsequent Wellness visit  Last Medicare Wellness visit information reviewed, patient interviewed and updates made to the record today  Health Risk Assessment:   Patient rates overall health as good  Patient feels that their physical health rating is slightly worse  Patient is satisfied with their life  Eyesight was rated as slightly worse  Hearing was rated as slightly worse  Patient feels that their emotional and mental health rating is same  Patients states they are never, rarely angry  Patient states they are sometimes unusually tired/fatigued  Pain experienced in the last 7 days has been some  Patient's pain rating has been 7/10  Patient states that he has experienced weight loss or gain in last 6 months  He has lost or gained weight without trying follow-up next visit       Depression Screening:   PHQ-2 Score: 0  PHQ-9 Score: 0      Fall Risk Screening: In the past year, patient has experienced: no history of falling in past year      Home Safety:  Patient does not have trouble with stairs inside or outside of their home  Patient has working smoke alarms and has working carbon monoxide detector  Home safety hazards include: none  Nutrition:   Current diet is Regular  Medications:   Patient is currently taking over-the-counter supplements  OTC medications include: see medication list  Patient is able to manage medications  Activities of Daily Living (ADLs)/Instrumental Activities of Daily Living (IADLs):   Walk and transfer into and out of bed and chair?: Yes  Dress and groom yourself?: Yes    Bathe or shower yourself?: Yes    Feed yourself? Yes  Do your laundry/housekeeping?: Yes  Manage your money, pay your bills and track your expenses?: Yes  Make your own meals?: Yes    Do your own shopping?: Yes    Previous Hospitalizations:   Any hospitalizations or ED visits within the last 12 months?: Yes    How many hospitalizations have you had in the last year?: 3-4    Advance Care Planning:   Living will: Yes    Durable POA for healthcare: Yes    Advanced directive: Yes      Cognitive Screening:   Provider or family/friend/caregiver concerned regarding cognition?: Yes    Cognition Comments: Forgetfulness- follow-up next visit       PREVENTIVE SCREENINGS      Cardiovascular Screening:    General: Screening Not Indicated and History Lipid Disorder      Diabetes Screening:     General: Screening Current      Colorectal Cancer Screening:     General: Screening Current      Prostate Cancer Screening:    General: Screening Not Indicated      Osteoporosis Screening:    General: Screening Not Indicated      Abdominal Aortic Aneurysm (AAA) Screening:    Risk factors include: tobacco use        General: Screening Not Indicated      Lung Cancer Screening:     General: Screening Not Indicated      Hepatitis C Screening:    General: Screening Not Indicated    Screening, Brief Intervention, and Referral to Treatment (SBIRT)    Screening  Typical number of drinks in a day: 0  Typical number of drinks in a week: 0  Interpretation: Low risk drinking behavior      Single Item Drug Screening:  How often have you used an illegal drug (including marijuana) or a prescription medication for non-medical reasons in the past year? never    Single Item Drug Screen Score: 0  Interpretation: Negative screen for possible drug use disorder      Savannah Garrido, DO

## 2021-03-20 LAB
ATRIAL RATE: 76 BPM
P AXIS: 70 DEGREES
PR INTERVAL: 168 MS
QRS AXIS: 69 DEGREES
QRSD INTERVAL: 94 MS
QT INTERVAL: 396 MS
QTC INTERVAL: 445 MS
T WAVE AXIS: 70 DEGREES
VENTRICULAR RATE: 76 BPM

## 2021-03-20 PROCEDURE — NC001 PR NO CHARGE: Performed by: INTERNAL MEDICINE

## 2021-03-24 ENCOUNTER — OFFICE VISIT (OUTPATIENT)
Dept: SURGERY | Facility: CLINIC | Age: 76
End: 2021-03-24
Payer: COMMERCIAL

## 2021-03-24 VITALS
RESPIRATION RATE: 18 BRPM | DIASTOLIC BLOOD PRESSURE: 68 MMHG | HEIGHT: 68 IN | TEMPERATURE: 97.1 F | SYSTOLIC BLOOD PRESSURE: 110 MMHG | WEIGHT: 219 LBS | BODY MASS INDEX: 33.19 KG/M2 | HEART RATE: 94 BPM

## 2021-03-24 DIAGNOSIS — R07.89 COSTOCHONDRAL CHEST PAIN: Primary | ICD-10-CM

## 2021-03-24 PROCEDURE — 99213 OFFICE O/P EST LOW 20 MIN: CPT | Performed by: SURGERY

## 2021-03-24 PROCEDURE — 1160F RVW MEDS BY RX/DR IN RCRD: CPT | Performed by: SURGERY

## 2021-03-24 PROCEDURE — 1036F TOBACCO NON-USER: CPT | Performed by: SURGERY

## 2021-03-24 PROCEDURE — 1111F DSCHRG MED/CURRENT MED MERGE: CPT | Performed by: SURGERY

## 2021-03-24 NOTE — PROGRESS NOTES
Assessment/Plan:    Costochondral chest pain  Patient returns for 2 month follow-up regarding his generalized right chest and back pain secondary to a costal chondritis  The patient reports a gradual improvement in his symptom complex since last seen in the office  Additionally he has undergone multiple additional testing procedures for various other complaints  Today in the office he is well appearing  He is pleasant competent reliable as a historian  Inspection and palpation of the right chest wall reveals no new skin abnormalities  His pain is localized to the right paraspinal region of the mid back  The patient appears clinically stable with regards to his costochondral pain of the right ribcage  I have recommended over-the-counter medications for symptomatic relief  He has been encouraged to follow up with the practice at any point the future with questions or concerns  Diagnoses and all orders for this visit:    Costochondral chest pain          Subjective:      Patient ID: Octavio Thomas is a 67 y o  male  HPI    The following portions of the patient's history were reviewed and updated as appropriate: allergies, current medications, past family history, past medical history, past social history, past surgical history and problem list     Review of Systems   Constitutional: Negative for chills and fever  HENT: Negative for ear pain and sore throat  Eyes: Negative for pain and visual disturbance  Respiratory: Negative for cough and shortness of breath  CT the chest from February 2021 reveals several small lesions for which repeat CT imaging in 1 year is recommended to the patient  He is aware of this recommendation  Cardiovascular: Negative for chest pain and palpitations  Gastrointestinal: Negative for abdominal pain and vomiting  Genitourinary: Negative for dysuria and hematuria  Musculoskeletal: Negative for arthralgias and back pain     Skin: Negative for color change and rash  Neurological: Negative for seizures and syncope  All other systems reviewed and are negative  Objective:      /68 (BP Location: Left arm, Patient Position: Sitting, Cuff Size: Standard)   Pulse 94   Temp (!) 97 1 °F (36 2 °C) (Temporal)   Resp 18   Ht 5' 8" (1 727 m)   Wt 99 3 kg (219 lb)   BMI 33 30 kg/m²          Physical Exam  Vitals signs and nursing note reviewed  Constitutional:       Appearance: He is well-developed  HENT:      Head: Normocephalic and atraumatic  Eyes:      Conjunctiva/sclera: Conjunctivae normal       Pupils: Pupils are equal, round, and reactive to light  Neck:      Musculoskeletal: Normal range of motion and neck supple  Cardiovascular:      Rate and Rhythm: Normal rate and regular rhythm  Pulmonary:      Effort: Pulmonary effort is normal       Breath sounds: Normal breath sounds  Abdominal:      General: Bowel sounds are normal       Palpations: Abdomen is soft  Musculoskeletal: Normal range of motion  Comments: Focal point tenderness over the right paraspinal region of the midthoracic spine  Skin:     General: Skin is warm and dry  Neurological:      Mental Status: He is alert and oriented to person, place, and time  Psychiatric:         Behavior: Behavior normal          Thought Content:  Thought content normal          Judgment: Judgment normal

## 2021-03-24 NOTE — ASSESSMENT & PLAN NOTE
Patient returns for 2 month follow-up regarding his generalized right chest and back pain secondary to a costal chondritis  The patient reports a gradual improvement in his symptom complex since last seen in the office  Additionally he has undergone multiple additional testing procedures for various other complaints  Today in the office he is well appearing  He is pleasant competent reliable as a historian  Inspection and palpation of the right chest wall reveals no new skin abnormalities  His pain is localized to the right paraspinal region of the mid back  The patient appears clinically stable with regards to his costochondral pain of the right ribcage  I have recommended over-the-counter medications for symptomatic relief  He has been encouraged to follow up with the practice at any point the future with questions or concerns

## 2021-03-24 NOTE — LETTER
March 24, 2021     Alexandra Reyes, [address] [place] [address]    Patient: Franca Dorado   YOB: 1945   Date of Visit: 3/24/2021       Dear Dr Ashok Torrez: Thank you for referring Girma Givens to me for evaluation  Below are my notes for this consultation  If you have questions, please do not hesitate to call me  I look forward to following your patient along with you  Sincerely,        Lynn Tovar MD        CC: No Recipients  Lynn Tovar MD  3/24/2021 12:16 PM  Incomplete  Assessment/Plan:    Costochondral chest pain  Patient returns for 2 month follow-up regarding his generalized right chest and back pain secondary to a costal chondritis  The patient reports a gradual improvement in his symptom complex since last seen in the office  Additionally he has undergone multiple additional testing procedures for various other complaints  Today in the office he is well appearing  He is pleasant competent reliable as a historian  Inspection and palpation of the right chest wall reveals no new skin abnormalities  His pain is localized to the right paraspinal region of the mid back  The patient appears clinically stable with regards to his costochondral pain of the right ribcage  I have recommended over-the-counter medications for symptomatic relief  He has been encouraged to follow up with the practice at any point the future with questions or concerns  Diagnoses and all orders for this visit:    Costochondral chest pain          Subjective:      Patient ID: Franca Dorado is a 67 y o  male  HPI    The following portions of the patient's history were reviewed and updated as appropriate: allergies, current medications, past family history, past medical history, past social history, past surgical history and problem list     Review of Systems   Constitutional: Negative for chills and fever     HENT: Negative for ear pain and sore throat  Eyes: Negative for pain and visual disturbance  Respiratory: Negative for cough and shortness of breath  CT the chest from February 2021 reveals several small lesions for which repeat CT imaging in 1 year is recommended to the patient  He is aware of this recommendation  Cardiovascular: Negative for chest pain and palpitations  Gastrointestinal: Negative for abdominal pain and vomiting  Genitourinary: Negative for dysuria and hematuria  Musculoskeletal: Negative for arthralgias and back pain  Skin: Negative for color change and rash  Neurological: Negative for seizures and syncope  All other systems reviewed and are negative  Objective:      /68 (BP Location: Left arm, Patient Position: Sitting, Cuff Size: Standard)   Pulse 94   Temp (!) 97 1 °F (36 2 °C) (Temporal)   Resp 18   Ht 5' 8" (1 727 m)   Wt 99 3 kg (219 lb)   BMI 33 30 kg/m²          Physical Exam  Vitals signs and nursing note reviewed  Constitutional:       Appearance: He is well-developed  HENT:      Head: Normocephalic and atraumatic  Eyes:      Conjunctiva/sclera: Conjunctivae normal       Pupils: Pupils are equal, round, and reactive to light  Neck:      Musculoskeletal: Normal range of motion and neck supple  Cardiovascular:      Rate and Rhythm: Normal rate and regular rhythm  Pulmonary:      Effort: Pulmonary effort is normal       Breath sounds: Normal breath sounds  Abdominal:      General: Bowel sounds are normal       Palpations: Abdomen is soft  Musculoskeletal: Normal range of motion  Comments: Focal point tenderness over the right paraspinal region of the midthoracic spine  Skin:     General: Skin is warm and dry  Neurological:      Mental Status: He is alert and oriented to person, place, and time  Psychiatric:         Behavior: Behavior normal          Thought Content:  Thought content normal          Judgment: Judgment normal

## 2021-04-08 ENCOUNTER — CLINICAL SUPPORT (OUTPATIENT)
Dept: PULMONOLOGY | Facility: HOSPITAL | Age: 76
End: 2021-04-08
Payer: COMMERCIAL

## 2021-04-08 DIAGNOSIS — J43.1 PANLOBULAR EMPHYSEMA (HCC): Primary | ICD-10-CM

## 2021-04-08 NOTE — PROGRESS NOTES
Pulmonary Rehabilitation Plan of Care   Care Plan           Today's date: 2021   Total visits to date:   Patient name: Manpreet Beckett      : 1945  Age: 67 y o  MRN: [id number]  Referring Physician: Yeni Wade DO  Provider: Ashley Wesley  Clinician: Rahel Breen, MPT    Dx: Haydee Lull Emphysema  Date of onset: 2021    COMMENTS: Patient seen for initial evaluation this date  Patient arrived carrying his portable 02 concentrator  He performed a 6 MWT on room air  His resting vitals were HR 84, /66 and 02 sat 94% on RA  He was able to accomplish 495 feet and attain a 1 72 MET Level  His peak vitals were HR 98, 02 sat 84% and /78  At recovery his vitals were HR 81, /56 and 02 sat 92%  He rated the test a 6 on a 1-10 RPE scale  He rated his SOB a 7/10  Patient is an excellent rehabilitation candidate due to high prior level of function, support system and willingness to progress  His program will be progressed as he is able to tolerate  He will receive education specific to his disease process  [facility]  Patient does not have current PFT results in the Epic system  Patient states his last one was years ago  Patient is scheduled [facility] on   He will discuss frequency of appointments at that time due to significant co-pay  Medication compliance: Yes   Comments: Patient admits to 100% medication compliance  Fall Risk: Low   Comments: patient ambulates w/out AD  He exhibits good dynamic standing balance and 4/5 BLE strength  Patient denies any falls or syncopal episodes over the last few weeks  Alysha Arnold EXERCISE/ACTIVITY    Cardiopulmonary Goals:   Min: 30-35   HR: 20-30 > -115   RPE: 4-6  (moderate to moderately hard exercise)   O2 sat: >90% Supplemental 02 will be applied and titrated as per department policy      Modalities: Treadmill, UBE, NuStep and Recumbent bike  Strength trainin-3 days / week, 12-15 repitations  and 1-2 sets per modality    Modalities: Leg press and UE PREs    Exercise Progression: Progress as tolerated to maintain RPE 4-6      RPE 4-7  Home activity: Patient is sedentary at this time due to [address]  He expresses frustration w/his current condition     Goals: 10% improvement in functional capacity, home exercise days opposite NE, improved DASI score and >150 mins of exercise/wk  Education: Benefit of exercise, home exercise and pursed lip breathing   Plan:home exercise target 30 mins, 2 days opposite NE and Improved 6MW results  Readiness to change: Preparation:  (Getting ready to change)     NUTRITION    Weight control: Patient's goal weight is < 200#   Starting weight: 219        Current Weight: 219     Diabetes: N/A  Goals:improved A1c and Improved Rate Your Plate score  Education:More frequent meals, smaller portions  hydration  weight loss  Plan: Education Class: Healthy Eating  Readiness to change: Preparation:  (Getting ready to change)     PSYCHOSOCIAL    Emotional:  1-4 = Minimal Depression  Social support: Excellent  Goals: Reduce perceived stress to 1-3/10, improved [facility] QOL < 27, PHQ-9 - reduced severity by one level, continue medical therapy, Physical Fitness in [facility] Score < 3, Social Support in [place] Score < 3, Daily Activity in [facility] Score < 3, Increased interest in doing things, improved concentration, improved positive thoughts of well being and increased energy  Education: signs/sxs of depression  benefits of positive support system  coping mechanisms  depression and chronic disease  Plan: Education Class: Stress and Your Lungs, Relaxation and Mindfulness and Anxiety and Lung Disease  Readiness to change: Preparation:  (Getting ready to change)     OTHER CORE COMPONENTS     Tobacco:   Social History     Tobacco Use   Smoking Status Former Smoker    Quit date:     Years since quittin 2   Smokeless Tobacco Never Used Oxygen: Patient is 02 dependent at 2 liters  He also sleeps w/a CPAP machine  He states he often does not require 02 at rest  He perfroms self monitoring w/a pulse oximeter  Blood pressure:    Restin/66   Exercise:  144/78   Recovery: 120/56    Goals: consistent BP < 130/80, reduced dietary sodium <2300mg, moderate intensity exercise >150 mins/wk and medication compliance  Education: Pulmonary Disease, physiology, Exercise, strength, flexibility, Conservation of energy, oxygen, breathing techniques, Medication and Avoiding Infections  Plan: Causes of lung disease, Prevention and treatment, Exercise benefits and Proper nutrition  Readiness to change: Preparation:  (Getting ready to change)         CARDIAC/PULMONARY REHAB ASSESSMENT    Today's date: 2021   Patient name: Mitchel Gutierrez      : 1945       MRN: [id number]  PCP: Mariela Ferreira DO  Pulmonologist: Dr Columba Boss DO  Dx: Carly Fell Emphysema  Date of onset: 21  Cultural needs: None    Height:   68 inches  Weight:    219 pounds  Medical History:   Past Medical History:   Diagnosis Date    COPD [facility]chronic obstructive pulmonary disease) ([facility] [address] )     Coronary artery disease     history of stenting    Hyperlipidemia     Hypertension     Sleep apnea        Physical Limitations:  Patient is limited by CHACON and 02 dependence    Risk Factors   Smoking:Former smoker- quit   HTN: Yes  DM: No  Obesity: Yes  Inactivity: Yes  Family History:   Family History   Problem Relation Age of Onset    Heart attack Mother     Dementia Mother      Allergies: Allergies   Allergen Reactions    Lisinopril Swelling and Cough    Tetanus Antitoxin Anaphylaxis    Tetanus Toxoid Anaphylaxis and Swelling    Pneumococcal Vaccine Swelling     Patient states he is not allergic        Other:     Current Medications:   Current Outpatient Medications   Medication Sig Dispense Refill    albuterol (5 mg/mL) 0 5 % nebulizer solution 2 5 mg every 6 (six) hours as needed       Ascorbic Acid (vitamin C) 1000 MG tablet Take 1,000 mg by mouth daily      aspirin 81 mg chewable tablet CHEW ONE TABLET BY MOUTH EVERY DAY      azithromycin (ZITHROMAX) 250 mg tablet Take 1 every Monday, Wednesday and Friday  45 tablet 1    budesonide-formoterol (SYMBICORT) 160-4 5 mcg/act inhaler INHALE TWO INHALATIONS BY MOUTH TWICE A DAY - RINSE MOUTH AFTER USE      famotidine (PEPCID) 20 mg tablet Take 1 tablet (20 mg total) by mouth daily 100 tablet 1    fluticasone (FLONASE) 50 mcg/act nasal spray into each nostril as needed       furosemide (LASIX) 40 mg tablet Take 40 mg by mouth daily      losartan (COZAAR) 25 mg tablet Take 1 tablet (25 mg total) by mouth daily 100 tablet 1    metoprolol succinate (TOPROL-XL) 25 mg 24 hr tablet Take 1 tablet (25 mg total) by mouth daily 100 tablet 1    Multiple Vitamin (multivitamin) tablet Take 1 tablet by mouth daily      nitroglycerin (NITROSTAT) 0 4 mg SL tablet DISSOLVE ONE TABLET UNDER THE TONGUE  PRN      potassium chloride (Klor-Con) 10 mEq tablet Take 20 mEq by mouth daily      rosuvastatin (CRESTOR) 40 MG tablet Take 1 tablet (40 mg total) by mouth daily 100 tablet 1    tiotropium (SPIRIVA) 18 mcg inhalation capsule INHALE CONTENTS OF ONE CAPSULE BY MOUTH ONCE DAILY USING HANDIHALER DEVICE      tiZANidine (ZANAFLEX) 4 mg tablet Take 1 tablet (4 mg total) by mouth every 8 (eight) hours as needed for muscle spasms (Patient not taking: Reported on 3/19/2021) 10 tablet 0     No current facility-administered medications for this visit  Functional Status Prior to Diagnosis for Treatment   Occupation: Retired-   Recreation: Activities w/friends and family  ADLs: Independent w/personal ADLs  [place]: Local driving, ambulation w/out AD and light household chores  Exercise: Patient currently does not follow a HEP   He is agreeable to attend [place]  Other: Patient is an [place] [place] [address]; possible exposures    Short Term Program Goals: Decrease shortness of breath, improve stamina, increase strength   Long Term Goals: Be able to participate in favorite activity, sport, hobby (golf, hunt, sew, play games, cook) and enjoy family, friends without breathing difficulties    Comments:Patient's goals are weight loss, decrease CHACON, decrease fatigue, improve strength, improve ability to ambulate, decrease reliance on medication, initiate a HEP and attain his maximal level of function  Ability to reach goals/rehabilitation potential: high    Projected return to function: Full    Emotional/Social    Marital status:   Full-time     Life Stressors: Current health situation  Stress level rated 5-7/10  Goals: Decrease stress level to 3/10    Comments: Patient presents w/weakness, debility and CHACON  He is very motivated to progress  Patient requires skilled UT interventions in order to attain her goals and maximal level of function

## 2021-04-12 ENCOUNTER — CLINICAL SUPPORT (OUTPATIENT)
Dept: PULMONOLOGY | Facility: HOSPITAL | Age: 76
End: 2021-04-12
Payer: COMMERCIAL

## 2021-04-12 DIAGNOSIS — J43.1 PANLOBULAR EMPHYSEMA (HCC): ICD-10-CM

## 2021-04-12 PROCEDURE — G0239 OTH RESP PROC, GROUP: HCPCS

## 2021-04-14 ENCOUNTER — CLINICAL SUPPORT (OUTPATIENT)
Dept: PULMONOLOGY | Facility: HOSPITAL | Age: 76
End: 2021-04-14
Payer: COMMERCIAL

## 2021-04-14 DIAGNOSIS — J43.1 PANLOBULAR EMPHYSEMA (HCC): ICD-10-CM

## 2021-04-14 PROCEDURE — G0239 OTH RESP PROC, GROUP: HCPCS

## 2021-04-16 ENCOUNTER — CLINICAL SUPPORT (OUTPATIENT)
Dept: PULMONOLOGY | Facility: HOSPITAL | Age: 76
End: 2021-04-16
Payer: COMMERCIAL

## 2021-04-16 DIAGNOSIS — J43.1 PANLOBULAR EMPHYSEMA (HCC): ICD-10-CM

## 2021-04-16 PROCEDURE — G0239 OTH RESP PROC, GROUP: HCPCS

## 2021-04-19 ENCOUNTER — CLINICAL SUPPORT (OUTPATIENT)
Dept: PULMONOLOGY | Facility: HOSPITAL | Age: 76
End: 2021-04-19
Payer: COMMERCIAL

## 2021-04-19 DIAGNOSIS — J43.1 PANLOBULAR EMPHYSEMA (HCC): ICD-10-CM

## 2021-04-19 PROCEDURE — G0239 OTH RESP PROC, GROUP: HCPCS

## 2021-04-20 ENCOUNTER — OFFICE VISIT (OUTPATIENT)
Dept: PULMONOLOGY | Facility: CLINIC | Age: 76
End: 2021-04-20
Payer: COMMERCIAL

## 2021-04-20 VITALS
BODY MASS INDEX: 33.25 KG/M2 | OXYGEN SATURATION: 92 % | SYSTOLIC BLOOD PRESSURE: 94 MMHG | HEART RATE: 81 BPM | TEMPERATURE: 97.9 F | HEIGHT: 68 IN | WEIGHT: 219.4 LBS | DIASTOLIC BLOOD PRESSURE: 60 MMHG

## 2021-04-20 DIAGNOSIS — G47.30 SLEEP APNEA, UNSPECIFIED TYPE: Primary | ICD-10-CM

## 2021-04-20 DIAGNOSIS — J42 CHRONIC BRONCHITIS, UNSPECIFIED CHRONIC BRONCHITIS TYPE (HCC): ICD-10-CM

## 2021-04-20 PROCEDURE — 1160F RVW MEDS BY RX/DR IN RCRD: CPT | Performed by: INTERNAL MEDICINE

## 2021-04-20 PROCEDURE — 3078F DIAST BP <80 MM HG: CPT | Performed by: INTERNAL MEDICINE

## 2021-04-20 PROCEDURE — 3074F SYST BP LT 130 MM HG: CPT | Performed by: INTERNAL MEDICINE

## 2021-04-20 PROCEDURE — 99214 OFFICE O/P EST MOD 30 MIN: CPT | Performed by: INTERNAL MEDICINE

## 2021-04-20 PROCEDURE — 1036F TOBACCO NON-USER: CPT | Performed by: INTERNAL MEDICINE

## 2021-04-20 NOTE — ASSESSMENT & PLAN NOTE
Mayi Marte and I talked at length about his phenotype  Prior to starting on Zithromax he was getting exacerbations that he needs to be hospitalized recently for bronchitis and pneumonia  I think he should stay on Zithromax since this seems to have abated his symptoms  He will maintain on Symbicort and Spiriva as well and uses rescue inhaler as needed  He is going to transition from our pulmonary rehab to Silver sneakers due to his large co-pay  He wears oxygen when he exercises  He is up-to-date on his coronavirus vaccines  I reviewed his most recent CT scan which shows stable subcentimeter lung nodules

## 2021-04-20 NOTE — ASSESSMENT & PLAN NOTE
Matt Cruz is compliant with his CPAP he wears it all night every night  He has no snoring through the machine and feels well rested  He has a chip but we do not have a chip reader  I asked him to go to the [place] with his chip to attempt to get it read and  Send us a copy of the results

## 2021-04-20 NOTE — PROGRESS NOTES
Assessment/Plan:    Sleep apnea    Shlomo is compliant with his CPAP he wears it all night every night  He has no snoring through the machine and feels well rested  He has a chip but we do not have a chip reader  I asked him to go to the [place] with his chip to attempt to get it read and  Send us a copy of the results  COPD (chronic obstructive pulmonary disease) ([facility] )   Jannette Strauss and I talked at length about his phenotype  Prior to starting on Zithromax he was getting exacerbations that he needs to be hospitalized recently for bronchitis and pneumonia  I think he should stay on Zithromax since this seems to have abated his symptoms  He will maintain on Symbicort and Spiriva as well and uses rescue inhaler as needed  He is going to transition from our pulmonary rehab to Silver sneakers due to his large co-pay  He wears oxygen when he exercises  He is up-to-date on his coronavirus vaccines  I reviewed his most recent CT scan which shows stable subcentimeter lung nodules  Diagnoses and all orders for this visit:    Sleep apnea, unspecified type    Chronic bronchitis, unspecified chronic bronchitis type ([facility] )          Subjective:      Patient ID: Demarcus Mcclain is a 67 y o  male  Jannette Strauss is here for routine follow-up  He feels his breathing is stable he takes Symbicort Spiriva as prescribed  He denies using his rescue inhaler nebulizer  Been doing pulmonary rehab and feels he is doing well  He denies any chronic cough or wheeze at this time  He does have some swelling in his left ankle which is chronic  He takes his Lasix once a day  The following portions of the patient's history were reviewed and updated as appropriate: allergies, current medications, past family history, past medical history, past social history, past surgical history and problem list     Review of Systems   Constitutional: Negative  HENT: Negative  Eyes: Negative  Respiratory: Negative for shortness of breath  Cardiovascular: Negative  Gastrointestinal: Negative  Endocrine: Negative  Genitourinary: Negative  Allergic/Immunologic: Negative  Neurological: Negative  Hematological: Negative  Psychiatric/Behavioral: Negative  Objective:      BP 94/60   Pulse 81   Temp 97 9 °F (36 6 °C) (Tympanic)   Ht 5' 8" (1 727 m)   Wt 99 5 kg (219 lb 6 4 oz)   SpO2 92%   BMI 33 36 kg/m²          Physical Exam  Constitutional:       Appearance: He is well-developed  HENT:      Head: Normocephalic  Eyes:      Pupils: Pupils are equal, round, and reactive to light  Neck:      Musculoskeletal: Neck supple  Cardiovascular:      Rate and Rhythm: Normal rate  Pulmonary:      Effort: Pulmonary effort is normal  No respiratory distress  Breath sounds: No wheezing or rales  Abdominal:      Palpations: Abdomen is soft  Musculoskeletal: Normal range of motion  Skin:     General: Skin is warm and dry  Neurological:      Mental Status: He is alert and oriented to person, place, and time

## 2021-04-21 ENCOUNTER — APPOINTMENT (OUTPATIENT)
Dept: PULMONOLOGY | Facility: HOSPITAL | Age: 76
End: 2021-04-21
Payer: COMMERCIAL

## 2021-04-21 DIAGNOSIS — K21.9 GASTROESOPHAGEAL REFLUX DISEASE, UNSPECIFIED WHETHER ESOPHAGITIS PRESENT: ICD-10-CM

## 2021-04-21 DIAGNOSIS — M54.16 LUMBAR RADICULOPATHY: ICD-10-CM

## 2021-04-21 RX ORDER — FAMOTIDINE 20 MG/1
20 TABLET, FILM COATED ORAL DAILY
Qty: 100 TABLET | Refills: 0 | Status: SHIPPED | OUTPATIENT
Start: 2021-04-21 | End: 2021-06-08 | Stop reason: SDUPTHER

## 2021-04-21 RX ORDER — TIZANIDINE 4 MG/1
4 TABLET ORAL EVERY 8 HOURS PRN
Qty: 10 TABLET | Refills: 0 | Status: SHIPPED | OUTPATIENT
Start: 2021-04-21 | End: 2021-06-08 | Stop reason: SDUPTHER

## 2021-04-21 NOTE — TELEPHONE ENCOUNTER
Prior patient message reviewed it looks like he pulled his back muscles and this is why he wants Zanaflex refilled  Prescriptions sent- let him know if anything persists or worsens I can see him for a visit  Thanks!

## 2021-04-21 NOTE — TELEPHONE ENCOUNTER
Patient requesting refill(s) of:zanaflex  Last filled:2/22/21  Last appt:3/19/21  Next appt:7/22/21  [facility]    Patient requesting refill(s) of:  pepcid   Last filled:2/19/21  Last appt:  Next appt:  Pharmacy:

## 2021-04-21 NOTE — TELEPHONE ENCOUNTER
Pt called and needs refill on his Tizanidine 4 mg takes Q 8 hrs for muscle spasms  # [address] [facility],[address]

## 2021-04-28 ENCOUNTER — APPOINTMENT (OUTPATIENT)
Dept: PULMONOLOGY | Facility: HOSPITAL | Age: 76
End: 2021-04-28
Payer: COMMERCIAL

## 2021-04-30 ENCOUNTER — APPOINTMENT (OUTPATIENT)
Dept: PULMONOLOGY | Facility: HOSPITAL | Age: 76
End: 2021-04-30
Payer: COMMERCIAL

## 2021-05-07 ENCOUNTER — TELEPHONE (OUTPATIENT)
Dept: CARDIAC REHAB | Facility: HOSPITAL | Age: 76
End: 2021-05-07

## 2021-05-07 NOTE — PROGRESS NOTES
Pulmonary Rehabilitation Plan of Care   Care Plan           Today's date: 2021   Total visits to date:   Patient name: Fifi Wilburn      : 1945  Age: 67 y o  MRN: [id number]  Referring Physician: Lawrence Bhakta PT  Provider: [address]  Clinician: JAN Duckworth    Dx: [place] Emphysema  Date of onset: 2021    COMMENTS: Patient has completed 5 AL sessions as of  this date  Patient requires supplemental 02 at 2 liters to maintain his 02 Sat > 90% during his exercise sessions  His resting vitals were HR 75, /60 and 02 sat 93% on RA  He was able to work consistently at a 3 5 MET Level  His peak vitals were HR 92, 02 sat 84% and /68  At recovery his vitals were HR 85, /66 and 02 sat 97%  He rated his program a 3-6 on a 1-10 RPE scale  He rated his SOB as 3-7/10  Patient has been an excellent rehabilitation candidate due to high prior level of function, support system and willingness to progress  His program was progressed as he is able to tolerate  He will receive education specific to his disease process  He was educational materials during his sessions  Patient has decided to discontinue AL due to a high co pay per visit and transition to a Phase 3 program at a local fitness center  He plans to utilize his [employer]ne[employer] benefits  Medication compliance: Yes   Comments: Patient admits to 100% medication compliance  Fall Risk: Low   Comments: patient ambulates w/out AD  He exhibits good dynamic standing balance and 4/5 BLE strength  Patient denies any falls or syncopal episodes over the last few weeks  Dinora Felipe EXERCISE/ACTIVITY    Cardiopulmonary Goals:   Min: 30-35   HR: 20-30 > -115   RPE: 4-6  (moderate to moderately hard exercise)   O2 sat: >90% Supplemental 02 will be applied and titrated as per department policy      Modalities: Treadmill, UBE, NuStep and Recumbent bike  Strength trainin-3 days / week, 12-15 repitations  and 1-2 sets per modality    Modalities: Leg press and UE PREs    Exercise Progression: Progress as tolerated to maintain RPE 4-6      RPE 4-7  Home activity: Patient is sedentary at this time due to CHACON  He expresses frustration w/his current condition  Goals: 10% improvement in functional capacity, home exercise days opposite [place], improved DASI score and >150 mins of exercise/wk  Education: Benefit of exercise, home exercise and pursed lip breathing   Plan:home exercise target 30 mins, 2 days opposite TN and Improved 6MW results  Readiness to change: Preparation:  (Getting ready to change)     NUTRITION    Weight control: Patient's goal weight is < 200#   Starting weight: 219        Current Weight: 219     Diabetes: N/A  Goals:improved A1c and Improved Rate Your Plate score  Education:More frequent meals, smaller portions  hydration  weight loss  Plan: Education Class: Healthy Eating  Readiness to change: Action:  (Changing behavior)    PSYCHOSOCIAL    Emotional:  1-4 = Minimal Depression  Social support: Excellent  Goals: Reduce perceived stress to 1-3/10, improved [facility] QOL < 27, PHQ-9 - reduced severity by one level, continue medical therapy, Physical Fitness in [facility] Score < 3, Social Support in [place] Score < 3, Daily Activity in [facility] Score < 3, Increased interest in doing things, improved concentration, improved positive thoughts of well being and increased energy  Education: signs/sxs of depression  benefits of positive support system  coping mechanisms  depression and chronic disease  Plan: Education Class: Stress and Your Lungs, Relaxation and Mindfulness and Anxiety and Lung Disease  Readiness to change: Action:  (Changing behavior)    OTHER CORE COMPONENTS     Tobacco:   Social History     Tobacco Use   Smoking Status Former Smoker    Quit date:     Years since quittin 3   Smokeless Tobacco Never Used     Oxygen: Patient is 02 dependent at 2 liters  He also sleeps w/a CPAP machine   He states he often does not require 02 at rest  He perfORms self monitoring w/a pulse oximeter      Blood pressure:    Restin/60   Exercise:  138/68   Recovery: 126/66    Goals: consistent BP < 130/80, reduced dietary sodium <2300mg, moderate intensity exercise >150 mins/wk and medication compliance  Education: Pulmonary Disease, physiology, Exercise, strength, flexibility, Conservation of energy, oxygen, breathing techniques, Medication and Avoiding Infections  Plan: Causes of lung disease, Prevention and treatment, Exercise benefits and Proper nutrition  Readiness to change: Action:  (Changing behavior)

## 2021-06-08 DIAGNOSIS — K21.9 GASTROESOPHAGEAL REFLUX DISEASE, UNSPECIFIED WHETHER ESOPHAGITIS PRESENT: ICD-10-CM

## 2021-06-08 DIAGNOSIS — E78.5 HYPERLIPIDEMIA, UNSPECIFIED HYPERLIPIDEMIA TYPE: ICD-10-CM

## 2021-06-08 DIAGNOSIS — M54.16 LUMBAR RADICULOPATHY: ICD-10-CM

## 2021-06-08 DIAGNOSIS — I10 ESSENTIAL HYPERTENSION: ICD-10-CM

## 2021-06-08 DIAGNOSIS — I25.9 CHRONIC ISCHEMIC HEART DISEASE: Primary | ICD-10-CM

## 2021-06-08 DIAGNOSIS — J44.9 CHRONIC OBSTRUCTIVE PULMONARY DISEASE, UNSPECIFIED COPD TYPE (HCC): ICD-10-CM

## 2021-06-08 RX ORDER — METOPROLOL SUCCINATE 25 MG/1
25 TABLET, EXTENDED RELEASE ORAL DAILY
Qty: 90 TABLET | Refills: 1 | Status: SHIPPED | OUTPATIENT
Start: 2021-06-08 | End: 2021-06-08

## 2021-06-08 RX ORDER — AZITHROMYCIN 250 MG/1
TABLET, FILM COATED ORAL
Qty: 45 TABLET | Refills: 1 | Status: SHIPPED | OUTPATIENT
Start: 2021-06-08 | End: 2021-06-08

## 2021-06-08 RX ORDER — FAMOTIDINE 20 MG/1
20 TABLET, FILM COATED ORAL DAILY
Qty: 100 TABLET | Refills: 3 | Status: SHIPPED | OUTPATIENT
Start: 2021-06-08 | End: 2022-01-19 | Stop reason: SDUPTHER

## 2021-06-08 RX ORDER — POTASSIUM CHLORIDE 750 MG/1
20 TABLET, FILM COATED, EXTENDED RELEASE ORAL DAILY
Qty: 90 TABLET | Refills: 1 | Status: SHIPPED | OUTPATIENT
Start: 2021-06-08 | End: 2021-06-08

## 2021-06-08 RX ORDER — METOPROLOL SUCCINATE 25 MG/1
25 TABLET, EXTENDED RELEASE ORAL DAILY
Qty: 100 TABLET | Refills: 3 | Status: SHIPPED | OUTPATIENT
Start: 2021-06-08 | End: 2021-09-02 | Stop reason: SDUPTHER

## 2021-06-08 RX ORDER — AZITHROMYCIN 250 MG/1
TABLET, FILM COATED ORAL
Qty: 45 TABLET | Refills: 3 | Status: SHIPPED | OUTPATIENT
Start: 2021-06-08 | End: 2022-02-11 | Stop reason: SDUPTHER

## 2021-06-08 RX ORDER — TIZANIDINE 4 MG/1
4 TABLET ORAL EVERY 8 HOURS PRN
Qty: 45 TABLET | Refills: 0 | Status: SHIPPED | OUTPATIENT
Start: 2021-06-08 | End: 2022-01-19 | Stop reason: SDUPTHER

## 2021-06-08 RX ORDER — ROSUVASTATIN CALCIUM 40 MG/1
40 TABLET, COATED ORAL DAILY
Qty: 90 TABLET | Refills: 1 | Status: SHIPPED | OUTPATIENT
Start: 2021-06-08 | End: 2021-06-08

## 2021-06-08 RX ORDER — POTASSIUM CHLORIDE 750 MG/1
20 TABLET, FILM COATED, EXTENDED RELEASE ORAL DAILY
Qty: 200 TABLET | Refills: 3 | Status: SHIPPED | OUTPATIENT
Start: 2021-06-08 | End: 2022-01-19 | Stop reason: SDUPTHER

## 2021-06-08 RX ORDER — LOSARTAN POTASSIUM 25 MG/1
25 TABLET ORAL DAILY
Qty: 90 TABLET | Refills: 1 | Status: SHIPPED | OUTPATIENT
Start: 2021-06-08 | End: 2021-06-08

## 2021-06-08 RX ORDER — ROSUVASTATIN CALCIUM 40 MG/1
40 TABLET, COATED ORAL DAILY
Qty: 100 TABLET | Refills: 3 | Status: SHIPPED | OUTPATIENT
Start: 2021-06-08 | End: 2022-01-19 | Stop reason: SDUPTHER

## 2021-06-08 RX ORDER — FUROSEMIDE 40 MG/1
40 TABLET ORAL DAILY
Qty: 100 TABLET | Refills: 3 | Status: SHIPPED | OUTPATIENT
Start: 2021-06-08 | End: 2022-01-19 | Stop reason: SDUPTHER

## 2021-06-08 RX ORDER — FUROSEMIDE 40 MG/1
40 TABLET ORAL DAILY
Qty: 90 TABLET | Refills: 1 | Status: SHIPPED | OUTPATIENT
Start: 2021-06-08 | End: 2021-06-08

## 2021-06-08 RX ORDER — LOSARTAN POTASSIUM 25 MG/1
25 TABLET ORAL DAILY
Qty: 100 TABLET | Refills: 3 | Status: SHIPPED | OUTPATIENT
Start: 2021-06-08 | End: 2021-07-22

## 2021-06-08 RX ORDER — TIZANIDINE 4 MG/1
4 TABLET ORAL EVERY 8 HOURS PRN
Qty: 90 TABLET | Refills: 0 | Status: SHIPPED | OUTPATIENT
Start: 2021-06-08 | End: 2021-06-08

## 2021-06-08 NOTE — TELEPHONE ENCOUNTER
Patient requesting refill(s) of:  Tizanidine 4mg tab    Last filled:  4/21/21  Last appt:   3/19/21  Next appt:   7/22/21  Pharmacy:   [facility]  [address]

## 2021-07-18 ENCOUNTER — TELEPHONE (OUTPATIENT)
Dept: ADMINISTRATIVE | Facility: OTHER | Age: 76
End: 2021-07-18

## 2021-07-18 NOTE — TELEPHONE ENCOUNTER
Manually entered 1- & 2- dates, No PA-SIIS covid vaccine dates, [facility] lists a single date 2-

## 2021-07-18 NOTE — LETTER
Vaccination Request Form: [id number] aka SARS-CoV-2 ([employer]adis Carbajal or Aidan See or [employer])      Date Requested: 21  Patient: Jenna Chowdhury  Patient : 1945   Referring Provider: Caitlin Keys DO       The above patient has informed us that they have had their   most recent COVID-19 aka SARS-CoV-2 (Miladis Carbajal or [employer]idan See or [employer]) administered at your facility  Please   complete this form and attach all corresponding documentation  Date of Vaccine(s) Given  ______________________________    Lot Number(s) _______________________________________    Manufacture(s) ______________________________________    Dose Amount (s) _____________________________________    Expiration Date(s) ____________________________________    Comments __________________________________________________________  ____________________________________________________________________  ____________________________________________________________________  ____________________________________________________________________    Administering Facility  ________________________________________________    Vaccine Administered By (print name) ___________________________________      Form Completed By (print name) _______________________________________      Signature ___________________________________________________________      These reports are needed for  compliance    Please fax this completed form and a copy of the Vaccine Documents to our office located at [place] [address] as soon as possible to [phone] attention Andreina: Phone [phone]    We thank you for your assistance in treating our mutual patient     (sent to [address] of Information)

## 2021-07-18 NOTE — TELEPHONE ENCOUNTER
Initial outreach has been made via fax, please see Contacts section for details       Thank you  Jason Morrison MA

## 2021-07-18 NOTE — LETTER
Vaccination Request Form: [id number] aka SARS-CoV-2 ([employer] Elks or "[employer] or [employer])      Date Requested: 21  Patient: Kristy Fails  Patient : 1945   Referring Provider: Nancy Starr DO       The above patient has informed us that they have had their   most recent COVID-19 aka SARS-CoV-2 (Kailee Elks or "[employer] or [employer]) administered at your facility  Please   complete this form and attach all corresponding documentation  Date of Vaccine(s) Given  ______________________________    Lot Number(s) _______________________________________    Manufacture(s) ______________________________________    Dose Amount (s) _____________________________________    Expiration Date(s) ____________________________________    Comments __________________________________________________________  ____________________________________________________________________  ____________________________________________________________________  ____________________________________________________________________    Administering Facility  ________________________________________________    Vaccine Administered By (print name) ___________________________________      Form Completed By (print name) _______________________________________      Signature ___________________________________________________________      These reports are needed for  compliance    Please fax this completed form and a copy of the Vaccine Documents to our office located at [place] [address] as soon as possible to [phone] attention Andreina: Phone [phone]    We thank you for your assistance in treating our mutual patient     (sent to [address] [place] [address])

## 2021-07-19 ENCOUNTER — TELEPHONE (OUTPATIENT)
Dept: FAMILY MEDICINE CLINIC | Facility: CLINIC | Age: 76
End: 2021-07-19

## 2021-07-19 DIAGNOSIS — R73.01 ELEVATED FASTING GLUCOSE: Primary | ICD-10-CM

## 2021-07-19 NOTE — TELEPHONE ENCOUNTER
Patient called asking is Dr Darling Stout wanted any lab work, he was under the impression she ordered some  Taking his wife for her blood work tomorrow for their appointment Thursday   If any is ordered please call patient  [phone]

## 2021-07-20 ENCOUNTER — APPOINTMENT (OUTPATIENT)
Dept: LAB | Facility: HOSPITAL | Age: 76
End: 2021-07-20
Payer: COMMERCIAL

## 2021-07-20 DIAGNOSIS — R73.01 ELEVATED FASTING GLUCOSE: ICD-10-CM

## 2021-07-20 LAB
ALBUMIN SERPL BCP-MCNC: 4.1 G/DL (ref 3.5–5.7)
ALP SERPL-CCNC: 72 U/L (ref 55–165)
ALT SERPL W P-5'-P-CCNC: 25 U/L (ref 7–52)
ANION GAP SERPL CALCULATED.3IONS-SCNC: 6 MMOL/L (ref 4–13)
AST SERPL W P-5'-P-CCNC: 23 U/L (ref 13–39)
BILIRUB SERPL-MCNC: 0.6 MG/DL (ref 0.2–1)
BUN SERPL-MCNC: 19 MG/DL (ref 7–25)
CALCIUM SERPL-MCNC: 8.8 MG/DL (ref 8.6–10.5)
CHLORIDE SERPL-SCNC: 104 MMOL/L (ref 98–107)
CO2 SERPL-SCNC: 33 MMOL/L (ref 21–31)
CREAT SERPL-MCNC: 1.14 MG/DL (ref 0.7–1.3)
EST. AVERAGE GLUCOSE BLD GHB EST-MCNC: 117 MG/DL
GFR SERPL CREATININE-BSD FRML MDRD: 62 ML/MIN/1.73SQ M
GLUCOSE P FAST SERPL-MCNC: 105 MG/DL (ref 65–99)
HBA1C MFR BLD: 5.7 %
POTASSIUM SERPL-SCNC: 3.6 MMOL/L (ref 3.5–5.5)
PROT SERPL-MCNC: 6.5 G/DL (ref 6.4–8.9)
SODIUM SERPL-SCNC: 143 MMOL/L (ref 134–143)

## 2021-07-20 PROCEDURE — 80053 COMPREHEN METABOLIC PANEL: CPT

## 2021-07-20 PROCEDURE — 83036 HEMOGLOBIN GLYCOSYLATED A1C: CPT

## 2021-07-20 PROCEDURE — 36415 COLL VENOUS BLD VENIPUNCTURE: CPT

## 2021-07-20 NOTE — TELEPHONE ENCOUNTER
Received Record Request Response from Jennifer Ornelas stating 'A thorough search of our systems of records has revealed that we do not have reocrds which are response to our request '

## 2021-07-20 NOTE — TELEPHONE ENCOUNTER
As a follow-up, a second attempt has been made for outreach via telephone call, please see Contacts section for details      Thank you  Lashonda Martinez MA

## 2021-07-20 NOTE — TELEPHONE ENCOUNTER
3:16 PM 07/20/21  Communication with Patient/Patient Representative regarding documentation of Manually entered 1- & 2- dates, No PA-GLENNA covid vaccine dates, [facility] lists a single date 2-- patient stated had [address] 1-19-21 or 1-, 2-16-21, patient stated he would email me a picture of his covid vaccine card- kept me on the line while he had trouble, family member assisted email , pt stated he would like to confirm reciept/ not received by 3:10pm; will call [address]

## 2021-07-22 ENCOUNTER — OFFICE VISIT (OUTPATIENT)
Dept: FAMILY MEDICINE CLINIC | Facility: CLINIC | Age: 76
End: 2021-07-22
Payer: COMMERCIAL

## 2021-07-22 VITALS
TEMPERATURE: 97.8 F | DIASTOLIC BLOOD PRESSURE: 58 MMHG | SYSTOLIC BLOOD PRESSURE: 90 MMHG | HEART RATE: 87 BPM | BODY MASS INDEX: 32.77 KG/M2 | OXYGEN SATURATION: 96 % | WEIGHT: 216.2 LBS | RESPIRATION RATE: 20 BRPM | HEIGHT: 68 IN

## 2021-07-22 DIAGNOSIS — S30.812A ABRASION OF PENIS, INITIAL ENCOUNTER: ICD-10-CM

## 2021-07-22 DIAGNOSIS — I10 ESSENTIAL HYPERTENSION: Primary | ICD-10-CM

## 2021-07-22 DIAGNOSIS — H92.01 EAR PAIN, RIGHT: ICD-10-CM

## 2021-07-22 DIAGNOSIS — R73.03 PREDIABETES: ICD-10-CM

## 2021-07-22 PROCEDURE — 1160F RVW MEDS BY RX/DR IN RCRD: CPT | Performed by: FAMILY MEDICINE

## 2021-07-22 PROCEDURE — 99214 OFFICE O/P EST MOD 30 MIN: CPT | Performed by: FAMILY MEDICINE

## 2021-07-22 PROCEDURE — 3725F SCREEN DEPRESSION PERFORMED: CPT | Performed by: FAMILY MEDICINE

## 2021-07-22 PROCEDURE — 1036F TOBACCO NON-USER: CPT | Performed by: FAMILY MEDICINE

## 2021-07-22 RX ORDER — CIPROFLOXACIN AND DEXAMETHASONE 3; 1 MG/ML; MG/ML
4 SUSPENSION/ DROPS AURICULAR (OTIC) 2 TIMES DAILY
Qty: 7.5 ML | Refills: 0 | Status: SHIPPED | OUTPATIENT
Start: 2021-07-22 | End: 2021-08-11

## 2021-07-22 RX ORDER — CIPROFLOXACIN AND DEXAMETHASONE 3; 1 MG/ML; MG/ML
4 SUSPENSION/ DROPS AURICULAR (OTIC) 2 TIMES DAILY
Qty: 7.5 ML | Refills: 0 | Status: SHIPPED | OUTPATIENT
Start: 2021-07-22 | End: 2021-07-22 | Stop reason: SDUPTHER

## 2021-07-22 NOTE — PROGRESS NOTES
Assessment/Plan:       Problem List Items Addressed This Visit        Cardiovascular and Mediastinum    Hypertension - Primary       Other    Prediabetes      Other Visit Diagnoses     Abrasion of penis, initial encounter        Relevant Orders    Ambulatory referral to Urology    Ear pain, right                Subjective:      Patient ID: Thien Whitman is a 67 y o  male  HPI     Hypertension- Currently taking metoprolol succinate 25 mg daily and losartan 25 mg daily  CMP normal  BP 90/58 today, HR 87  BP at home low as well, every once in awhile 812 systolic but generally lower  Feels lightheadedness/dizziness with standing up  Will discontinue losartan  Pre-diabetes- A1c elevated 5 7  Diet modification discussed  Shortness of breath worsening, follows with pulmonologist for this  Has to stop and rest after a flight of steps, couple times in the morning feeling more and more winded with exertion  Big difference compared to a year ago  A couple times oxygen level in 60%, has home portable oxygen  No chest pains  Right ear has a pain sometimes, wears a new hearing aid from the [place] but same size  Chronic lower back pain right lower back, improved, not taking the Zanaflex, can still move and twist, not as bad as had been, improved 100%, sometimes feels  Seems to get better with the Zanaflex  Will continue to monitor  Constipation- Eating more red meat, took an laxative and this helped  Diet related, no abdominal pain  Will monitor as he improves his diet  Previously caught his penis in his zipper, had a wound previously and tried putting Neosporin on it, doesn't seem to be healing  The first wound occurred over a year ago  Referral to urology provided       The following portions of the patient's history were reviewed and updated as appropriate: allergies, current medications, past family history, past medical history, past social history, past surgical history and problem list     Review of Systems   Constitutional: Negative for chills and fever  HENT: Positive for ear pain  Negative for ear discharge  Respiratory: Positive for cough and shortness of breath  Cardiovascular: Negative for chest pain  Neurological: Positive for dizziness  Objective:      BP 90/58 (BP Location: Left arm, Patient Position: Sitting, Cuff Size: Standard)   Pulse 87   Temp 97 8 °F (36 6 °C)   Resp 20   Ht 5' 8" (1 727 m)   Wt 98 1 kg (216 lb 3 2 oz)   SpO2 96%   BMI 32 87 kg/m²          Physical Exam  Vitals reviewed  Constitutional:       General: He is not in acute distress  Appearance: Normal appearance  He is not ill-appearing, toxic-appearing or diaphoretic  HENT:      Head: Normocephalic and atraumatic  Ears:      Comments: Right ear with erythema distal canal and outer rim of TM  Eyes:      General:         Right eye: No discharge  Left eye: No discharge  Extraocular Movements: Extraocular movements intact  Conjunctiva/sclera: Conjunctivae normal    Cardiovascular:      Rate and Rhythm: Normal rate and regular rhythm  Heart sounds: Normal heart sounds  No murmur heard  No friction rub  No gallop  Pulmonary:      Effort: Pulmonary effort is normal  No respiratory distress  Breath sounds: Normal breath sounds  No stridor  No wheezing or rhonchi  Comments: Diminished breath sounds at bases   Genitourinary:     Penis: Uncircumcised  Musculoskeletal:      Cervical back: Normal range of motion and neck supple  No rigidity  Skin:     General: Skin is warm  Coloration: Skin is not pale  Findings: No erythema or rash  Neurological:      Mental Status: He is alert and oriented to person, place, and time  Motor: No weakness     Psychiatric:         Mood and Affect: Mood normal          Behavior: Behavior normal            DO Kaleb Adams [address] [place] [address]

## 2021-07-22 NOTE — TELEPHONE ENCOUNTER
Patient requesting the medication be sent to [facility] in [place] instead of mail order,

## 2021-08-01 NOTE — TELEPHONE ENCOUNTER
As a follow-up, a second attempt has been made for outreach via fax, please see Contacts section for details      Thank you  Stevan Ferrell MA

## 2021-08-08 ENCOUNTER — OFFICE VISIT (OUTPATIENT)
Dept: URGENT CARE | Facility: CLINIC | Age: 76
End: 2021-08-08
Payer: COMMERCIAL

## 2021-08-08 VITALS
TEMPERATURE: 97.9 F | BODY MASS INDEX: 32.74 KG/M2 | HEIGHT: 68 IN | RESPIRATION RATE: 20 BRPM | OXYGEN SATURATION: 94 % | DIASTOLIC BLOOD PRESSURE: 83 MMHG | HEART RATE: 87 BPM | SYSTOLIC BLOOD PRESSURE: 137 MMHG | WEIGHT: 216 LBS

## 2021-08-08 DIAGNOSIS — H91.91 HEARING LOSS OF RIGHT EAR, UNSPECIFIED HEARING LOSS TYPE: Primary | ICD-10-CM

## 2021-08-08 PROCEDURE — S9088 SERVICES PROVIDED IN URGENT: HCPCS | Performed by: PHYSICIAN ASSISTANT

## 2021-08-08 PROCEDURE — 99213 OFFICE O/P EST LOW 20 MIN: CPT | Performed by: PHYSICIAN ASSISTANT

## 2021-08-08 NOTE — PROGRESS NOTES
[facility]        NAME: Love Darden is a 67 y o  male  : 1945    MRN: [id number]  DATE: 2021  TIME: 5:19 PM    Assessment and Plan   Hearing loss of right ear, unspecified hearing loss type [H91 [id number]]  1  Hearing loss of right ear, unspecified hearing loss type  Ambulatory Referral to Otolaryngology         Patient Instructions     There is no evidence of an ear wax impaction or ear infection today  [facility] for further evaluation  Follow up with PCP in 3-5 days  Proceed to  ER if symptoms worsen  Chief Complaint     Chief Complaint   Patient presents with    Hearing Loss     right x 2 hours ago         History of Present Illness        Reports worsening hearing loss in right ear today  Patient is being treated with Ciprodex ear drops for otitis externa for the right ear  States he has an appointment this week for cerumen irrigation with the primary care doctor  Patient has hearing aids that was prescribed by the [place]  Review of Systems   Review of Systems   HENT: Positive for hearing loss  Negative for ear discharge and ear pain  Neurological: Negative for dizziness and headaches           Current Medications       Current Outpatient Medications:     albuterol (5 mg/mL) 0 5 % nebulizer solution, 2 5 mg every 6 (six) hours as needed , Disp: , Rfl:     Ascorbic Acid (vitamin C) 1000 MG tablet, Take 1,000 mg by mouth daily, Disp: , Rfl:     aspirin 81 mg chewable tablet, CHEW ONE TABLET BY MOUTH EVERY DAY, Disp: , Rfl:     azithromycin (ZITHROMAX) 250 mg tablet, Take 1 every Monday, Wednesday and Friday , Disp: 45 tablet, Rfl: 3    budesonide-formoterol (SYMBICORT) 160-4 5 mcg/act inhaler, INHALE TWO INHALATIONS BY MOUTH TWICE A DAY - RINSE MOUTH AFTER USE, Disp: , Rfl:     ciprofloxacin-dexamethasone (CIPRODEX) otic suspension, Administer 4 drops to the right ear 2 (two) times a day, Disp: 7 5 mL, Rfl: 0   famotidine (PEPCID) 20 mg tablet, Take 1 tablet (20 mg total) by mouth daily, Disp: 100 tablet, Rfl: 3    fluticasone (FLONASE) 50 mcg/act nasal spray, into each nostril as needed , Disp: , Rfl:     fluticasone-salmeterol (Advair) 250-50 mcg/dose inhaler, INHALE ONE INHALATION BY MOUTH TWICE A DAY FOR COPD   RINSE MOUTH WITH WATER AFTER USE **REPLACES SYMBICORT**, Disp: , Rfl:     furosemide (LASIX) 40 mg tablet, Take 1 tablet (40 mg total) by mouth daily, Disp: 100 tablet, Rfl: 3    metoprolol succinate (TOPROL-XL) 25 mg 24 hr tablet, Take 1 tablet (25 mg total) by mouth daily, Disp: 100 tablet, Rfl: 3    Multiple Vitamin (multivitamin) tablet, Take 1 tablet by mouth daily, Disp: , Rfl:     nitroglycerin (NITROSTAT) 0 4 mg SL tablet, DISSOLVE ONE TABLET UNDER THE TONGUE  PRN, Disp: , Rfl:     potassium chloride (Klor-Con) 10 mEq tablet, Take 2 tablets (20 mEq total) by mouth daily, Disp: 200 tablet, Rfl: 3    rosuvastatin (CRESTOR) 40 MG tablet, Take 1 tablet (40 mg total) by mouth daily, Disp: 100 tablet, Rfl: 3    tiotropium (SPIRIVA) 18 mcg inhalation capsule, INHALE CONTENTS OF ONE CAPSULE BY MOUTH ONCE DAILY USING HANDIHALER DEVICE, Disp: , Rfl:     tiZANidine (ZANAFLEX) 4 mg tablet, Take 1 tablet (4 mg total) by mouth every 8 (eight) hours as needed for muscle spasms, Disp: 45 tablet, Rfl: 0    Current Allergies     Allergies as of 08/08/2021 - Reviewed 08/08/2021   Allergen Reaction Noted    Lisinopril Swelling and Cough 01/18/2008    Tetanus antitoxin Anaphylaxis 06/09/2005    Tetanus toxoid Anaphylaxis and Swelling 01/16/2007            The following portions of the patient's history were reviewed and updated as appropriate: allergies, current medications, past family history, past medical history, past social history, past surgical history and problem list      Past Medical History:   Diagnosis Date    [facility] pulmonary disease) ([facility] [address] )     Coronary artery disease     history of stenting    Hyperlipidemia     Hypertension     Sleep apnea        Past Surgical History:   Procedure Laterality Date    COLONOSCOPY         Family History   Problem Relation Age of Onset    Heart attack Mother     Dementia Mother          Medications have been verified  Objective   /83   Pulse 87   Temp 97 9 °F (36 6 °C)   Resp 20   Ht 5' 8" (1 727 m)   Wt 98 kg (216 lb)   SpO2 94%   BMI 32 84 kg/m²   No LMP for male patient  Physical Exam     Physical Exam  Vitals reviewed  Constitutional:       General: He is not in acute distress  Appearance: He is well-developed  He is not diaphoretic  Comments: Patient unable to hear conversation  He was accompanied by wife  HENT:      Head: Normocephalic and atraumatic  Right Ear: Tympanic membrane and external ear normal       Left Ear: Tympanic membrane and external ear normal       Nose: Nose normal    Cardiovascular:      Rate and Rhythm: Normal rate and regular rhythm  Heart sounds: Normal heart sounds  No murmur heard  No friction rub  No gallop  Pulmonary:      Effort: Pulmonary effort is normal  No respiratory distress  Breath sounds: Normal breath sounds  No wheezing or rales  Chest:      Chest wall: No tenderness  Skin:     General: Skin is warm  Neurological:      Mental Status: He is alert  Psychiatric:         Behavior: Behavior normal          Thought Content:  Thought content normal          Judgment: Judgment normal

## 2021-08-08 NOTE — PATIENT INSTRUCTIONS
There is no evidence of an ear wax impaction or ear infection today  [facility] doctor for further evaluation  Follow up with PCP in 3-5 days  Proceed to  ER if symptoms worsen  Hearing Loss   WHAT YOU NEED TO KNOW:   Hearing loss means you have trouble hearing or you cannot hear at all in one or both ears  Hearing loss can happen suddenly or slowly over time  DISCHARGE INSTRUCTIONS:   Return to the emergency department if:   · You have fluid, pus, or blood leaking from your ear  · You have sudden, severe hearing loss  Contact your healthcare provider if:   · You have a fever  · You have ear pain that is getting worse  · You have ringing in your ears or dizziness that will not go away  · You have questions or concerns about your condition or care  Manage your hearing loss:   · Protect your hearing  Use ear plugs or ear protectors if you do activities that are very loud  These include using a lawnmower and power tools or going to a concert that has loud music  Use well-fitting foam earplugs that completely block your ear canal  Do not listen to loud music through headphones or earphones  · If you have hearing aids, wear them regularly  Talk to your healthcare provider if you are having trouble using your hearing aid  · Ask about cochlear implants  If hearing aids do not help you, talk to your healthcare provider  Cochlear implants may help you hear better  A cochlear implant is a tiny device that is put into your cochlea (part of your inner ear) during surgery  · Assistive listening devices  (ALDs) pic up sound and send it through earphones or a headset  ALDs can help you hear better when you are in a place with background noise  Examples include theaters, classrooms, or auditoriums  ALDs are also available for phones  ALDs can be used alone or with hearing aids or cochlear implants  · Tell people that you have hearing loss    Ask people to face you directly when they speak to you, and to slow down if they are speaking too fast  When you are in a group setting, sit in a location where you can clearly see the faces of the people who are speaking  Ask people not to speak loudly or shout when they are speaking to you  Try to talk with others in a quiet place  Background noise makes it harder for you to hear  · Pay close attention to your surroundings when you drive  Do not talk to people in your car while you are driving  Watch for problems on the road or approaching emergency vehicles  Follow up with your healthcare provider or audiologist as directed:  Write down your questions so you remember to ask them during your visits  © Copyright [employer] 2021 Information is for End User's use only and may not be sold, redistributed or otherwise used for commercial purposes  All illustrations and images included in CareNotes® are the copyrighted property of A D A M , Inc  or [facility] Vickie Bob   The above information is an  only  It is not intended as medical advice for individual conditions or treatments  Talk to your doctor, nurse or pharmacist before following any medical regimen to see if it is safe and effective for you

## 2021-08-11 ENCOUNTER — OFFICE VISIT (OUTPATIENT)
Dept: FAMILY MEDICINE CLINIC | Facility: CLINIC | Age: 76
End: 2021-08-11
Payer: COMMERCIAL

## 2021-08-11 VITALS
WEIGHT: 216 LBS | DIASTOLIC BLOOD PRESSURE: 72 MMHG | BODY MASS INDEX: 32.74 KG/M2 | SYSTOLIC BLOOD PRESSURE: 116 MMHG | HEIGHT: 68 IN | TEMPERATURE: 98.5 F | OXYGEN SATURATION: 98 % | HEART RATE: 75 BPM | RESPIRATION RATE: 20 BRPM

## 2021-08-11 DIAGNOSIS — H91.93 BILATERAL HEARING LOSS, UNSPECIFIED HEARING LOSS TYPE: ICD-10-CM

## 2021-08-11 DIAGNOSIS — I10 ESSENTIAL HYPERTENSION: Primary | ICD-10-CM

## 2021-08-11 PROCEDURE — 99213 OFFICE O/P EST LOW 20 MIN: CPT | Performed by: FAMILY MEDICINE

## 2021-08-11 NOTE — TELEPHONE ENCOUNTER
As a final attempt, a third outreach has been made via telephone call  Please see Contacts section for details  This encounter will be closed and completed by end of day  Should we receive the requested information because of previous outreach attempts, the requested patient's chart will be updated appropriately       Thank you  Jason Morrison MA

## 2021-08-11 NOTE — PROGRESS NOTES
Assessment/Plan:       Problem List Items Addressed This Visit        Cardiovascular and Mediastinum    Hypertension - Primary       Nervous and Auditory    Hearing loss            Subjective:      Patient ID: Itzel Swanson is a 67 y o  male  HPI     Hypertension- Last visit we stopped losartan due to lightheadedness/dizziness, continue metoprolol succinate 25 mg daily  Since last visit, dizziness has resolved, he is otherwise feeling well besides acute hearing loss as below  BP log reviewed and shows -130/60-70  Continue current regimen  Acute hearing loss- Evaluated by ENT, plan for prednisone taper, audiology evaluation and MRI brain  Will follow-up results  The following portions of the patient's history were reviewed and updated as appropriate: allergies, current medications, past family history, past medical history, past social history, past surgical history, and problem list     Review of Systems   Constitutional: Negative for chills and fever  HENT: Positive for hearing loss  Negative for ear discharge and ear pain  Cardiovascular: Negative for chest pain and palpitations  Neurological: Negative for dizziness and light-headedness  Objective:      /72   Pulse 75   Temp 98 5 °F (36 9 °C) (Tympanic)   Resp 20   Ht 5' 8" (1 727 m)   Wt 98 kg (216 lb)   SpO2 98%   BMI 32 84 kg/m²          Physical Exam  Vitals reviewed  Constitutional:       General: He is not in acute distress  Appearance: Normal appearance  He is not ill-appearing, toxic-appearing or diaphoretic  HENT:      Right Ear: Decreased hearing noted  Left Ear: Decreased hearing noted  Eyes:      General:         Right eye: No discharge  Left eye: No discharge  Extraocular Movements: Extraocular movements intact  Conjunctiva/sclera: Conjunctivae normal    Pulmonary:      Effort: Pulmonary effort is normal  No respiratory distress     Neurological:      Mental Status: He is alert and oriented to person, place, and time     Psychiatric:         Mood and Affect: Mood normal          Behavior: Behavior normal              Gwendolynn Cooks, DO [address] [place] [address] [facility]

## 2021-08-12 NOTE — TELEPHONE ENCOUNTER
Covid Vaccination Reconciliation         Upon review of the Covid Vaccine Reconciliation request we were able to locate, review, and update the patient chart as requested for Immunization(s) Covid  Any additional questions or concerns should be emailed to the Practice Liaisons via [email or web address] email, please do not reply via In Basket      Thank you  Irineo davey MA

## 2021-08-23 ENCOUNTER — OFFICE VISIT (OUTPATIENT)
Dept: PULMONOLOGY | Facility: CLINIC | Age: 76
End: 2021-08-23
Payer: COMMERCIAL

## 2021-08-23 VITALS
WEIGHT: 217.2 LBS | TEMPERATURE: 97.4 F | HEART RATE: 87 BPM | SYSTOLIC BLOOD PRESSURE: 106 MMHG | DIASTOLIC BLOOD PRESSURE: 59 MMHG | HEIGHT: 68 IN | OXYGEN SATURATION: 90 % | BODY MASS INDEX: 32.92 KG/M2

## 2021-08-23 DIAGNOSIS — I50.32 CHRONIC DIASTOLIC (CONGESTIVE) HEART FAILURE (HCC): ICD-10-CM

## 2021-08-23 DIAGNOSIS — J42 CHRONIC BRONCHITIS, UNSPECIFIED CHRONIC BRONCHITIS TYPE (HCC): Primary | ICD-10-CM

## 2021-08-23 PROCEDURE — 99214 OFFICE O/P EST MOD 30 MIN: CPT | Performed by: INTERNAL MEDICINE

## 2021-08-23 NOTE — ASSESSMENT & PLAN NOTE
Wt Readings from Last 3 Encounters:   08/23/21 98 5 kg (217 lb 3 2 oz)   08/11/21 98 kg (216 lb)   08/09/21 98 kg (216 lb)         Izora Leaks  appears well compensated on today's exam

## 2021-08-23 NOTE — PROGRESS NOTES
Assessment/Plan:    COPD (chronic obstructive pulmonary disease) ([facility] [address] )    Unfortunately I am  Unable to access Shlomo's spirometry from the [place]  He has been unwilling to perform spirometry in the past given that he had a syncopal event during previous attempts  I assume he has more moderate to severe COPD given his oxygen requirements and his x-ray findings  He will maintain on Wixela twice a day and Spiriva once daily in addition to the Zithromax 3 times a week  We talked about exacerbation when to call if he is experiencing 1  I encouraged him to restart his exercise program   I looked at his most recent CT scan which with without any suspicious nodules he is due for another CT scan in February which will schedule at his next visit  Chronic diastolic (congestive) heart failure (HCC)  Wt Readings from Last 3 Encounters:   08/23/21 98 5 kg (217 lb 3 2 oz)   08/11/21 98 kg (216 lb)   08/09/21 98 kg (216 lb)         Gilberto Arizmendi  appears well compensated on today's exam         Diagnoses and all orders for this visit:    Chronic bronchitis, unspecified chronic bronchitis type ([facility] [address] )    Chronic diastolic (congestive) heart failure (HCC)          Subjective:      Patient ID: Brandy Ny is a 67 y o  male  Shlomo's breathing is been stable, he has good days and bad days  Yesterday he uses rescue inhaler 3 times because of the humidity but normal uses it anywhere from 0-3 times daily  He takes his [address] and Spiriva daily as prescribed  He uses Zithromax and denies any significant cough or mucus production  The following portions of the patient's history were reviewed and updated as appropriate: allergies, current medications, past family history, past medical history, past social history, past surgical history and problem list     Review of Systems   HENT: Positive for hearing loss  Respiratory: Positive for shortness of breath  All other systems reviewed and are negative          Objective:      /59 Pulse 87   Temp (!) 97 4 °F (36 3 °C) (Tympanic)   Ht 5' 8" (1 727 m)   Wt 98 5 kg (217 lb 3 2 oz)   SpO2 90%   BMI 33 03 kg/m²          Physical Exam  Constitutional:       Appearance: He is well-developed  HENT:      Head: Normocephalic  Eyes:      Pupils: Pupils are equal, round, and reactive to light  Cardiovascular:      Rate and Rhythm: Normal rate  Pulmonary:      Effort: Pulmonary effort is normal  No respiratory distress  Breath sounds: No wheezing or rales  Abdominal:      Palpations: Abdomen is soft  Musculoskeletal:         General: Normal range of motion  Cervical back: Neck supple  Skin:     General: Skin is warm and dry  Neurological:      Mental Status: He is alert and oriented to person, place, and time

## 2021-08-23 NOTE — ASSESSMENT & PLAN NOTE
Unfortunately I am  Unable to access Shlomo's spirometry from the [place]  He has been unwilling to perform spirometry in the past given that he had a syncopal event during previous attempts  I assume he has more moderate to severe COPD given his oxygen requirements and his x-ray findings  He will maintain on Wixela twice a day and Spiriva once daily in addition to the Zithromax 3 times a week  We talked about exacerbation when to call if he is experiencing 1  I encouraged him to restart his exercise program   I looked at his most recent CT scan which with without any suspicious nodules he is due for another CT scan in February which will schedule at his next visit

## 2021-09-01 ENCOUNTER — HOSPITAL ENCOUNTER (OUTPATIENT)
Dept: MRI IMAGING | Facility: HOSPITAL | Age: 76
Discharge: HOME | End: 2021-09-01
Payer: COMMERCIAL

## 2021-09-01 DIAGNOSIS — H90.3 ASYMMETRICAL SENSORINEURAL HEARING LOSS: ICD-10-CM

## 2021-09-01 DIAGNOSIS — H91.21 SUDDEN HEARING LOSS, RIGHT: ICD-10-CM

## 2021-09-01 PROCEDURE — A9585 GADOBUTROL INJECTION: HCPCS | Performed by: OTOLARYNGOLOGY

## 2021-09-01 PROCEDURE — G1004 CDSM NDSC: HCPCS

## 2021-09-01 PROCEDURE — 70553 MRI BRAIN STEM W/O & W/DYE: CPT

## 2021-09-01 RX ADMIN — GADOBUTROL 9 ML: 604.72 INJECTION INTRAVENOUS at 12:59

## 2021-09-02 ENCOUNTER — CLINICAL SUPPORT (OUTPATIENT)
Dept: URGENT CARE | Facility: CLINIC | Age: 76
End: 2021-09-02
Payer: COMMERCIAL

## 2021-09-02 ENCOUNTER — OFFICE VISIT (OUTPATIENT)
Dept: FAMILY MEDICINE CLINIC | Facility: CLINIC | Age: 76
End: 2021-09-02
Payer: COMMERCIAL

## 2021-09-02 VITALS
DIASTOLIC BLOOD PRESSURE: 58 MMHG | OXYGEN SATURATION: 93 % | HEIGHT: 68 IN | RESPIRATION RATE: 20 BRPM | TEMPERATURE: 99.2 F | WEIGHT: 218 LBS | HEART RATE: 72 BPM | SYSTOLIC BLOOD PRESSURE: 104 MMHG | BODY MASS INDEX: 33.04 KG/M2

## 2021-09-02 DIAGNOSIS — G56.22 CUBITAL TUNNEL SYNDROME ON LEFT: ICD-10-CM

## 2021-09-02 DIAGNOSIS — I10 ESSENTIAL HYPERTENSION: Primary | ICD-10-CM

## 2021-09-02 DIAGNOSIS — I10 ESSENTIAL HYPERTENSION: ICD-10-CM

## 2021-09-02 DIAGNOSIS — H91.93 BILATERAL HEARING LOSS, UNSPECIFIED HEARING LOSS TYPE: ICD-10-CM

## 2021-09-02 LAB
ATRIAL RATE: 79 BPM
P AXIS: 80 DEGREES
PR INTERVAL: 162 MS
QRS AXIS: 75 DEGREES
QRSD INTERVAL: 94 MS
QT INTERVAL: 398 MS
QTC INTERVAL: 456 MS
T WAVE AXIS: -14 DEGREES
VENTRICULAR RATE: 79 BPM

## 2021-09-02 PROCEDURE — 3078F DIAST BP <80 MM HG: CPT | Performed by: FAMILY MEDICINE

## 2021-09-02 PROCEDURE — 99214 OFFICE O/P EST MOD 30 MIN: CPT | Performed by: FAMILY MEDICINE

## 2021-09-02 PROCEDURE — 93005 ELECTROCARDIOGRAM TRACING: CPT

## 2021-09-02 PROCEDURE — 93010 ELECTROCARDIOGRAM REPORT: CPT | Performed by: INTERNAL MEDICINE

## 2021-09-02 PROCEDURE — 1160F RVW MEDS BY RX/DR IN RCRD: CPT | Performed by: FAMILY MEDICINE

## 2021-09-02 PROCEDURE — 3074F SYST BP LT 130 MM HG: CPT | Performed by: FAMILY MEDICINE

## 2021-09-02 PROCEDURE — 1036F TOBACCO NON-USER: CPT | Performed by: FAMILY MEDICINE

## 2021-09-02 PROCEDURE — 3725F SCREEN DEPRESSION PERFORMED: CPT | Performed by: FAMILY MEDICINE

## 2021-09-02 RX ORDER — METOPROLOL SUCCINATE 25 MG/1
12.5 TABLET, EXTENDED RELEASE ORAL DAILY
Qty: 100 TABLET | Refills: 3
Start: 2021-09-02 | End: 2021-11-30 | Stop reason: SDUPTHER

## 2021-09-02 NOTE — PROGRESS NOTES
Assessment/Plan:       Problem List Items Addressed This Visit        Cardiovascular and Mediastinum    Hypertension - Primary    Relevant Medications    metoprolol succinate (TOPROL-XL) 25 mg 24 hr tablet    Other Relevant Orders    ECG 12 lead       Nervous and Auditory    Hearing loss    Cubital tunnel syndrome on left            Subjective:      Patient ID: Jayy Macias is a 67 y o  male  HPI     Hypertension- Currently taking Lasix 40 mg daily, metoprolol succinate 25 mg daily  BP today 104/58  Home log /80, 152/72, 140/72, 131/67, 104/54, 102/53  Some lightheadedness with this  Will decrease to 12 5 mg metoprolol  Follow-up in 6 weeks  Hearing loss- Has actually improved, hearing loss waxing and waning, but overall improving  Had MRI brain yesterday  Planned follow-up with ENT  Numbness and tingling pins and needle of left side 5th and 4th digit  Cubital tunnel left previously seen by orthopedics, previously ordered EMG  Advised patient to complete  Symptoms for at least a year  Recent fall, unsure if he slipped, unsure if he lost consciousness but doesn't think so  No head trauma, thinks his leg gave out  Right leg chronically weak from when he had his heart attack, they nicked the artery in the right leg and subsequent weakness since  Felt unstable  Was wearing good shoes, nothing slippery  Felt fine afterwards, no chest pains  Normal neurological exam  Advised to call if recurs, decrease BP medication, concern for hypotension as contributing factor  The following portions of the patient's history were reviewed and updated as appropriate: allergies, current medications, past family history, past medical history, past social history, past surgical history, and problem list     Review of Systems   Constitutional: Negative for activity change, appetite change, chills, fatigue and fever  HENT: Positive for hearing loss  Negative for congestion and sore throat      Eyes: Negative for discharge and redness  Respiratory: Positive for shortness of breath (at baseline)  Negative for cough, chest tightness and wheezing  Cardiovascular: Negative for chest pain and palpitations  Gastrointestinal: Negative for abdominal pain, constipation, diarrhea, nausea and vomiting  Genitourinary: Negative for decreased urine volume and difficulty urinating  Musculoskeletal: Negative for arthralgias  Skin: Negative for rash  Neurological: Positive for light-headedness and numbness  Negative for dizziness and headaches  Psychiatric/Behavioral: Negative for dysphoric mood  The patient is not nervous/anxious  Objective:      /58 (BP Location: Left arm, Patient Position: Sitting, Cuff Size: Standard)   Pulse 72   Temp 99 2 °F (37 3 °C)   Resp 20   Ht 5' 8" (1 727 m)   Wt 98 9 kg (218 lb)   SpO2 93%   BMI 33 15 kg/m²          Physical Exam  Vitals reviewed  Constitutional:       General: He is not in acute distress  Appearance: Normal appearance  He is not ill-appearing, toxic-appearing or diaphoretic  HENT:      Head: Normocephalic and atraumatic  Eyes:      General:         Right eye: No discharge  Left eye: No discharge  Extraocular Movements: Extraocular movements intact  Conjunctiva/sclera: Conjunctivae normal       Pupils: Pupils are equal, round, and reactive to light  Cardiovascular:      Rate and Rhythm: Normal rate  Rhythm irregular  Heart sounds: Normal heart sounds  No murmur heard  No friction rub  No gallop  Pulmonary:      Effort: Pulmonary effort is normal  No respiratory distress  Breath sounds: No stridor  No wheezing or rhonchi  Comments: Crackles and diminished breath sounds   Musculoskeletal:         General: No swelling, tenderness or signs of injury  Right lower leg: No edema  Left lower leg: No edema  Skin:     General: Skin is warm  Coloration: Skin is not pale        Findings: No erythema or rash  Neurological:      General: No focal deficit present  Mental Status: He is alert and oriented to person, place, and time  Cranial Nerves: No cranial nerve deficit  Motor: No weakness     Psychiatric:         Mood and Affect: Mood normal          Behavior: Behavior normal              DO Kaleb Ruiz [address] [place] [address]

## 2021-09-02 NOTE — PROGRESS NOTES
Shanna Velarde had walk-in EKG completed on 09/02/21 at 1:44 PM by Chidi Watkins for hypertension as ordered by PCP  No complaints offered at this time  Hattie Romo

## 2021-09-03 ENCOUNTER — TELEPHONE (OUTPATIENT)
Dept: FAMILY MEDICINE CLINIC | Facility: CLINIC | Age: 76
End: 2021-09-03

## 2021-09-03 NOTE — TELEPHONE ENCOUNTER
----- Message from Margot López DO sent at 9/2/2021 10:53 PM EDT -----  This patient needed follow-up in 6 weeks not 6 months- thanks!

## 2021-09-08 ENCOUNTER — TELEPHONE (OUTPATIENT)
Dept: FAMILY MEDICINE CLINIC | Facility: CLINIC | Age: 76
End: 2021-09-08

## 2021-09-09 NOTE — TELEPHONE ENCOUNTER
Please let the patient know his EKG was read by cardiology, appears to be unchanged from prior EKG  Will discuss further when I see him next visit, in the meantime call with any concerns or chest pain, worsening shortness of breath, lightheadedness or dizziness

## 2021-10-19 ENCOUNTER — OFFICE VISIT (OUTPATIENT)
Dept: FAMILY MEDICINE CLINIC | Facility: CLINIC | Age: 76
End: 2021-10-19
Payer: COMMERCIAL

## 2021-10-19 VITALS
RESPIRATION RATE: 18 BRPM | WEIGHT: 217.8 LBS | BODY MASS INDEX: 33.01 KG/M2 | HEART RATE: 94 BPM | TEMPERATURE: 98.9 F | DIASTOLIC BLOOD PRESSURE: 62 MMHG | OXYGEN SATURATION: 98 % | SYSTOLIC BLOOD PRESSURE: 120 MMHG | HEIGHT: 68 IN

## 2021-10-19 DIAGNOSIS — J42 CHRONIC BRONCHITIS, UNSPECIFIED CHRONIC BRONCHITIS TYPE (HCC): ICD-10-CM

## 2021-10-19 DIAGNOSIS — R41.3 MEMORY CHANGE: ICD-10-CM

## 2021-10-19 DIAGNOSIS — Z23 ENCOUNTER FOR IMMUNIZATION: ICD-10-CM

## 2021-10-19 DIAGNOSIS — G56.22 CUBITAL TUNNEL SYNDROME ON LEFT: Primary | ICD-10-CM

## 2021-10-19 DIAGNOSIS — I10 PRIMARY HYPERTENSION: Primary | ICD-10-CM

## 2021-10-19 DIAGNOSIS — F51.01 PRIMARY INSOMNIA: ICD-10-CM

## 2021-10-19 DIAGNOSIS — I10 ESSENTIAL HYPERTENSION: ICD-10-CM

## 2021-10-19 PROCEDURE — 3725F SCREEN DEPRESSION PERFORMED: CPT | Performed by: FAMILY MEDICINE

## 2021-10-19 PROCEDURE — 1036F TOBACCO NON-USER: CPT | Performed by: FAMILY MEDICINE

## 2021-10-19 PROCEDURE — 1160F RVW MEDS BY RX/DR IN RCRD: CPT | Performed by: FAMILY MEDICINE

## 2021-10-19 PROCEDURE — G0008 ADMIN INFLUENZA VIRUS VAC: HCPCS

## 2021-10-19 PROCEDURE — 3078F DIAST BP <80 MM HG: CPT | Performed by: FAMILY MEDICINE

## 2021-10-19 PROCEDURE — 90662 IIV NO PRSV INCREASED AG IM: CPT

## 2021-10-19 PROCEDURE — 99214 OFFICE O/P EST MOD 30 MIN: CPT | Performed by: FAMILY MEDICINE

## 2021-10-19 PROCEDURE — 3074F SYST BP LT 130 MM HG: CPT | Performed by: FAMILY MEDICINE

## 2021-10-19 RX ORDER — BLOOD PRESSURE TEST KIT
KIT MISCELLANEOUS DAILY
Qty: 1 KIT | Refills: 0 | Status: SHIPPED | OUTPATIENT
Start: 2021-10-19 | End: 2022-01-20

## 2021-10-19 RX ORDER — BLOOD PRESSURE TEST KIT
KIT MISCELLANEOUS DAILY
Qty: 1 KIT | Refills: 0 | Status: SHIPPED | OUTPATIENT
Start: 2021-10-19 | End: 2021-10-19

## 2021-10-20 ENCOUNTER — TELEPHONE (OUTPATIENT)
Dept: FAMILY MEDICINE CLINIC | Facility: CLINIC | Age: 76
End: 2021-10-20

## 2021-11-01 DIAGNOSIS — I10 PRIMARY HYPERTENSION: Primary | ICD-10-CM

## 2021-11-02 RX ORDER — BLOOD PRESSURE TEST KIT
KIT MISCELLANEOUS DAILY
Qty: 1 KIT | Refills: 0 | Status: SHIPPED | OUTPATIENT
Start: 2021-11-02

## 2021-11-18 ENCOUNTER — CONSULT (OUTPATIENT)
Dept: CARDIOLOGY CLINIC | Facility: CLINIC | Age: 76
End: 2021-11-18
Payer: COMMERCIAL

## 2021-11-18 ENCOUNTER — HOSPITAL ENCOUNTER (OUTPATIENT)
Dept: NON INVASIVE DIAGNOSTICS | Facility: CLINIC | Age: 76
Discharge: HOME | End: 2021-11-18
Payer: COMMERCIAL

## 2021-11-18 VITALS
WEIGHT: 218 LBS | SYSTOLIC BLOOD PRESSURE: 106 MMHG | HEIGHT: 68 IN | BODY MASS INDEX: 33.04 KG/M2 | DIASTOLIC BLOOD PRESSURE: 60 MMHG | HEART RATE: 80 BPM

## 2021-11-18 DIAGNOSIS — E78.2 MIXED HYPERLIPIDEMIA: ICD-10-CM

## 2021-11-18 DIAGNOSIS — I35.0 AORTIC VALVE STENOSIS, ETIOLOGY OF CARDIAC VALVE DISEASE UNSPECIFIED: ICD-10-CM

## 2021-11-18 DIAGNOSIS — I25.10 CORONARY ARTERY DISEASE INVOLVING NATIVE CORONARY ARTERY OF NATIVE HEART WITHOUT ANGINA PECTORIS: Primary | ICD-10-CM

## 2021-11-18 DIAGNOSIS — I65.23 BILATERAL CAROTID ARTERY STENOSIS: ICD-10-CM

## 2021-11-18 DIAGNOSIS — I10 PRIMARY HYPERTENSION: ICD-10-CM

## 2021-11-18 DIAGNOSIS — I49.3 PVC (PREMATURE VENTRICULAR CONTRACTION): ICD-10-CM

## 2021-11-18 DIAGNOSIS — E66.09 CLASS 1 OBESITY DUE TO EXCESS CALORIES WITH BODY MASS INDEX (BMI) OF 33.0 TO 33.9 IN ADULT, UNSPECIFIED WHETHER SERIOUS COMORBIDITY PRESENT: ICD-10-CM

## 2021-11-18 PROCEDURE — 99214 OFFICE O/P EST MOD 30 MIN: CPT | Performed by: INTERNAL MEDICINE

## 2021-11-18 PROCEDURE — 3078F DIAST BP <80 MM HG: CPT | Performed by: INTERNAL MEDICINE

## 2021-11-18 PROCEDURE — 93226 XTRNL ECG REC<48 HR SCAN A/R: CPT

## 2021-11-18 PROCEDURE — 93225 XTRNL ECG REC<48 HRS REC: CPT

## 2021-11-18 PROCEDURE — 93000 ELECTROCARDIOGRAM COMPLETE: CPT | Performed by: INTERNAL MEDICINE

## 2021-11-18 PROCEDURE — 3074F SYST BP LT 130 MM HG: CPT | Performed by: INTERNAL MEDICINE

## 2021-11-22 ENCOUNTER — APPOINTMENT (OUTPATIENT)
Dept: LAB | Facility: CLINIC | Age: 76
End: 2021-11-22
Payer: COMMERCIAL

## 2021-11-22 DIAGNOSIS — I25.10 CORONARY ARTERY DISEASE INVOLVING NATIVE CORONARY ARTERY OF NATIVE HEART WITHOUT ANGINA PECTORIS: ICD-10-CM

## 2021-11-22 DIAGNOSIS — I10 PRIMARY HYPERTENSION: ICD-10-CM

## 2021-11-22 DIAGNOSIS — I49.3 PVC (PREMATURE VENTRICULAR CONTRACTION): ICD-10-CM

## 2021-11-22 DIAGNOSIS — I35.0 AORTIC VALVE STENOSIS, ETIOLOGY OF CARDIAC VALVE DISEASE UNSPECIFIED: ICD-10-CM

## 2021-11-22 DIAGNOSIS — E78.2 MIXED HYPERLIPIDEMIA: ICD-10-CM

## 2021-11-22 LAB
ANION GAP SERPL CALCULATED.3IONS-SCNC: 5 MMOL/L (ref 4–13)
BUN SERPL-MCNC: 15 MG/DL (ref 5–25)
CALCIUM SERPL-MCNC: 9.2 MG/DL (ref 8.3–10.1)
CHLORIDE SERPL-SCNC: 104 MMOL/L (ref 100–108)
CO2 SERPL-SCNC: 31 MMOL/L (ref 21–32)
CREAT SERPL-MCNC: 1.19 MG/DL (ref 0.6–1.3)
GFR SERPL CREATININE-BSD FRML MDRD: 59 ML/MIN/1.73SQ M
GLUCOSE SERPL-MCNC: 101 MG/DL (ref 65–140)
POTASSIUM SERPL-SCNC: 3.9 MMOL/L (ref 3.5–5.3)
SODIUM SERPL-SCNC: 140 MMOL/L (ref 136–145)

## 2021-11-22 PROCEDURE — 80048 BASIC METABOLIC PNL TOTAL CA: CPT

## 2021-11-22 PROCEDURE — 36415 COLL VENOUS BLD VENIPUNCTURE: CPT

## 2021-11-29 ENCOUNTER — OFFICE VISIT (OUTPATIENT)
Dept: PULMONOLOGY | Facility: CLINIC | Age: 76
End: 2021-11-29
Payer: COMMERCIAL

## 2021-11-29 VITALS
OXYGEN SATURATION: 91 % | SYSTOLIC BLOOD PRESSURE: 128 MMHG | HEART RATE: 87 BPM | DIASTOLIC BLOOD PRESSURE: 63 MMHG | BODY MASS INDEX: 32.8 KG/M2 | HEIGHT: 68 IN | WEIGHT: 216.4 LBS | TEMPERATURE: 96.8 F

## 2021-11-29 DIAGNOSIS — G47.33 OBSTRUCTIVE SLEEP APNEA SYNDROME: ICD-10-CM

## 2021-11-29 DIAGNOSIS — I50.32 CHRONIC DIASTOLIC (CONGESTIVE) HEART FAILURE (HCC): ICD-10-CM

## 2021-11-29 DIAGNOSIS — J42 CHRONIC BRONCHITIS, UNSPECIFIED CHRONIC BRONCHITIS TYPE (HCC): Primary | ICD-10-CM

## 2021-11-29 DIAGNOSIS — R91.1 LUNG NODULE: ICD-10-CM

## 2021-11-29 PROCEDURE — 1160F RVW MEDS BY RX/DR IN RCRD: CPT | Performed by: INTERNAL MEDICINE

## 2021-11-29 PROCEDURE — 99214 OFFICE O/P EST MOD 30 MIN: CPT | Performed by: INTERNAL MEDICINE

## 2021-11-29 PROCEDURE — 1036F TOBACCO NON-USER: CPT | Performed by: INTERNAL MEDICINE

## 2021-11-29 RX ORDER — PREDNISONE 20 MG/1
40 TABLET ORAL DAILY
Qty: 10 TABLET | Refills: 0 | Status: SHIPPED | OUTPATIENT
Start: 2021-11-29 | End: 2021-11-29

## 2021-11-29 RX ORDER — PREDNISONE 20 MG/1
40 TABLET ORAL DAILY
Qty: 10 TABLET | Refills: 0 | Status: SHIPPED | OUTPATIENT
Start: 2021-11-29 | End: 2022-01-20 | Stop reason: ALTCHOICE

## 2021-11-30 ENCOUNTER — TELEPHONE (OUTPATIENT)
Dept: CARDIOLOGY CLINIC | Facility: CLINIC | Age: 76
End: 2021-11-30

## 2021-11-30 DIAGNOSIS — I10 ESSENTIAL HYPERTENSION: ICD-10-CM

## 2021-11-30 PROCEDURE — 93227 XTRNL ECG REC<48 HR R&I: CPT | Performed by: INTERNAL MEDICINE

## 2021-11-30 RX ORDER — METOPROLOL SUCCINATE 25 MG/1
12.5 TABLET, EXTENDED RELEASE ORAL 2 TIMES DAILY
Qty: 100 TABLET | Refills: 3
Start: 2021-11-30 | End: 2022-01-14 | Stop reason: SDUPTHER

## 2021-12-20 ENCOUNTER — OFFICE VISIT (OUTPATIENT)
Dept: CARDIOLOGY CLINIC | Facility: CLINIC | Age: 76
End: 2021-12-20
Payer: COMMERCIAL

## 2021-12-20 VITALS
WEIGHT: 214 LBS | DIASTOLIC BLOOD PRESSURE: 60 MMHG | SYSTOLIC BLOOD PRESSURE: 120 MMHG | BODY MASS INDEX: 32.43 KG/M2 | HEIGHT: 68 IN | HEART RATE: 80 BPM

## 2021-12-20 DIAGNOSIS — I25.9 CHRONIC ISCHEMIC HEART DISEASE: Primary | ICD-10-CM

## 2021-12-20 DIAGNOSIS — I25.10 CORONARY ARTERY DISEASE INVOLVING NATIVE CORONARY ARTERY OF NATIVE HEART WITHOUT ANGINA PECTORIS: ICD-10-CM

## 2021-12-20 DIAGNOSIS — G47.33 OBSTRUCTIVE SLEEP APNEA SYNDROME: ICD-10-CM

## 2021-12-20 DIAGNOSIS — E66.09 CLASS 1 OBESITY DUE TO EXCESS CALORIES WITH BODY MASS INDEX (BMI) OF 32.0 TO 32.9 IN ADULT, UNSPECIFIED WHETHER SERIOUS COMORBIDITY PRESENT: ICD-10-CM

## 2021-12-20 DIAGNOSIS — I50.32 CHRONIC DIASTOLIC (CONGESTIVE) HEART FAILURE (HCC): ICD-10-CM

## 2021-12-20 DIAGNOSIS — Z98.890 S/P CAROTID ENDARTERECTOMY: ICD-10-CM

## 2021-12-20 DIAGNOSIS — J44.9 CHRONIC OBSTRUCTIVE PULMONARY DISEASE, UNSPECIFIED COPD TYPE (HCC): ICD-10-CM

## 2021-12-20 DIAGNOSIS — I10 PRIMARY HYPERTENSION: ICD-10-CM

## 2021-12-20 DIAGNOSIS — Z95.5 STENTED CORONARY ARTERY: ICD-10-CM

## 2021-12-20 DIAGNOSIS — I49.3 PVC (PREMATURE VENTRICULAR CONTRACTION): ICD-10-CM

## 2021-12-20 PROCEDURE — 3074F SYST BP LT 130 MM HG: CPT | Performed by: INTERNAL MEDICINE

## 2021-12-20 PROCEDURE — 3078F DIAST BP <80 MM HG: CPT | Performed by: INTERNAL MEDICINE

## 2021-12-20 PROCEDURE — 1036F TOBACCO NON-USER: CPT | Performed by: INTERNAL MEDICINE

## 2021-12-20 PROCEDURE — 99214 OFFICE O/P EST MOD 30 MIN: CPT | Performed by: INTERNAL MEDICINE

## 2021-12-20 PROCEDURE — 1160F RVW MEDS BY RX/DR IN RCRD: CPT | Performed by: INTERNAL MEDICINE

## 2021-12-22 ENCOUNTER — TELEPHONE (OUTPATIENT)
Dept: OTHER | Facility: OTHER | Age: 76
End: 2021-12-22

## 2021-12-23 ENCOUNTER — TELEPHONE (OUTPATIENT)
Dept: NEUROLOGY | Facility: CLINIC | Age: 76
End: 2021-12-23

## 2021-12-29 ENCOUNTER — PROCEDURE VISIT (OUTPATIENT)
Dept: NEUROLOGY | Facility: CLINIC | Age: 76
End: 2021-12-29
Payer: COMMERCIAL

## 2021-12-29 DIAGNOSIS — G56.22 CUBITAL TUNNEL SYNDROME ON LEFT: ICD-10-CM

## 2021-12-29 PROCEDURE — 95886 MUSC TEST DONE W/N TEST COMP: CPT | Performed by: PSYCHIATRY & NEUROLOGY

## 2021-12-29 PROCEDURE — 95908 NRV CNDJ TST 3-4 STUDIES: CPT | Performed by: PSYCHIATRY & NEUROLOGY

## 2022-01-13 ENCOUNTER — RA CDI HCC (OUTPATIENT)
Dept: OTHER | Facility: HOSPITAL | Age: 77
End: 2022-01-13

## 2022-01-13 NOTE — PROGRESS NOTES
[place] [address]  coding opportunities          Number of diagnosis code(s) already on the problem list added to FYI fla     Chart Reviewed * (Number of) Inbasket suggestions sent to Provider: 1               Number of suggestions NOT actually used: 3     Patients insurance company: [address]  (Medicare Advantage and Commercial)     Visit status: Patient arrived for their scheduled appointment        [place] [address]  coding opportunities     DX: I11 0 Hypertensive heart disease with heart failure    Please review/recertify the BPA diagnoses for      Number of diagnosis code(s) already on the problem list added to FYI fla     Chart Reviewed * (Number of) Inbasket suggestions sent to Provider: 1                  Patients insurance company: [address]  (Medicare Advantage and [facility])

## 2022-01-14 ENCOUNTER — TELEPHONE (OUTPATIENT)
Dept: CARDIOLOGY CLINIC | Facility: CLINIC | Age: 77
End: 2022-01-14

## 2022-01-14 ENCOUNTER — HOSPITAL ENCOUNTER (OUTPATIENT)
Dept: NON INVASIVE DIAGNOSTICS | Facility: CLINIC | Age: 77
Discharge: HOME | End: 2022-01-14
Payer: COMMERCIAL

## 2022-01-14 VITALS
DIASTOLIC BLOOD PRESSURE: 60 MMHG | SYSTOLIC BLOOD PRESSURE: 120 MMHG | BODY MASS INDEX: 32.43 KG/M2 | HEART RATE: 79 BPM | WEIGHT: 214 LBS | HEIGHT: 68 IN

## 2022-01-14 DIAGNOSIS — I35.0 AORTIC VALVE STENOSIS, ETIOLOGY OF CARDIAC VALVE DISEASE UNSPECIFIED: ICD-10-CM

## 2022-01-14 DIAGNOSIS — I10 ESSENTIAL HYPERTENSION: ICD-10-CM

## 2022-01-14 DIAGNOSIS — I10 PRIMARY HYPERTENSION: ICD-10-CM

## 2022-01-14 DIAGNOSIS — I42.9 CARDIOMYOPATHY, UNSPECIFIED TYPE (HCC): ICD-10-CM

## 2022-01-14 DIAGNOSIS — I25.10 CORONARY ARTERY DISEASE INVOLVING NATIVE CORONARY ARTERY OF NATIVE HEART WITHOUT ANGINA PECTORIS: ICD-10-CM

## 2022-01-14 DIAGNOSIS — I49.3 PVC (PREMATURE VENTRICULAR CONTRACTION): Primary | ICD-10-CM

## 2022-01-14 LAB
AORTIC ROOT: 3.4 CM
AORTIC VALVE MEAN VELOCITY: 15.1 M/S
APICAL FOUR CHAMBER EJECTION FRACTION: 47 %
AV AREA BY CONTINUOUS VTI: 1.5 CM2
AV AREA PEAK VELOCITY: 1.5 CM2
AV LVOT MEAN GRADIENT: 2 MMHG
AV LVOT PEAK GRADIENT: 4 MMHG
AV MEAN GRADIENT: 10 MMHG
AV PEAK GRADIENT: 17 MMHG
AV REGURGITATION PRESSURE HALF TIME: 0.41 MS
AV VALVE AREA: 1.49 CM2
AV VELOCITY RATIO: 0.47
DOP CALC AO PEAK VEL: 2.07 M/S
DOP CALC AO VTI: 45.81 CM
DOP CALC LVOT AREA: 3.14 CM2
DOP CALC LVOT DIAMETER: 2 CM
DOP CALC LVOT PEAK VEL VTI: 21.69 CM
DOP CALC LVOT PEAK VEL: 0.97 M/S
DOP CALC LVOT STROKE INDEX: 31.4 ML/M2
DOP CALC LVOT STROKE VOLUME: 68.11 CM3
E WAVE DECELERATION TIME: 130 MS
FRACTIONAL SHORTENING: 20 % (ref 28–44)
INTERVENTRICULAR SEPTUM IN DIASTOLE (PARASTERNAL SHORT AXIS VIEW): 1.2 CM
LEFT ATRIUM AREA SYSTOLE SINGLE PLANE A4C: 17.1 CM2
LEFT ATRIUM SIZE: 4.2 CM
LEFT INTERNAL DIMENSION IN SYSTOLE: 4.8 CM (ref 2.1–4)
LEFT VENTRICULAR INTERNAL DIMENSION IN DIASTOLE: 6 CM (ref 6.06–9.03)
LEFT VENTRICULAR POSTERIOR WALL IN END DIASTOLE: 1 CM
LEFT VENTRICULAR STROKE VOLUME: 72 ML
MITRAL REGURGITATION PEAK VELOCITY: 5.85 M/S
MITRAL VALVE MEAN INFLOW VELOCITY: 4.35 M/S
MITRAL VALVE REGURGITANT PEAK GRADIENT: 137 MMHG
MV E'TISSUE VEL-SEP: 5 CM/S
MV PEAK A VEL: 1.24 M/S
MV PEAK E VEL: 84 CM/S
RIGHT ATRIUM AREA SYSTOLE A4C: 12.9 CM2
RIGHT VENTRICLE ID DIMENSION: 3 CM
SL CV AV DECELERATION TIME RETROGRADE: 1432 MS
SL CV AV PEAK GRADIENT RETROGRADE: 50 MMHG
SL CV DOP CALC MV VTI RETROGRADE: 208.4 CM
SL CV LV EF: 40
SL CV MV MEAN GRADIENT RETROGRADE: 85 MMHG
SL CV PED ECHO LEFT VENTRICLE DIASTOLIC VOLUME (MOD BIPLANE) 2D: 180 ML
SL CV PED ECHO LEFT VENTRICLE SYSTOLIC VOLUME (MOD BIPLANE) 2D: 108 ML
TRICUSPID VALVE PEAK REGURGITATION VELOCITY: 2.69 M/S
TV PEAK GRADIENT: 29 MMHG
Z-SCORE OF LEFT VENTRICULAR DIMENSION IN END SYSTOLE: -2.09

## 2022-01-14 PROCEDURE — 93306 TTE W/DOPPLER COMPLETE: CPT

## 2022-01-14 PROCEDURE — 93306 TTE W/DOPPLER COMPLETE: CPT | Performed by: INTERNAL MEDICINE

## 2022-01-14 RX ORDER — METOPROLOL SUCCINATE 25 MG/1
25 TABLET, EXTENDED RELEASE ORAL 2 TIMES DAILY
Qty: 100 TABLET | Refills: 3
Start: 2022-01-14 | End: 2022-02-03 | Stop reason: SDUPTHER

## 2022-01-14 NOTE — TELEPHONE ENCOUNTER
Called and spoke with patient regarding results of transthoracic echocardiogram which did show a mild to moderately reduced left ventricular systolic function estimated LVEF 40-45% which is lower than previous and slightly higher than measured EF on nuclear stress testing from March of 2021  Patient underwent cardiac catheterization in March of 2020 stress testing which did show coronary artery disease but no obstructive lesions that were requiring revascularization  At last visit after it was discovered on Holter monitor the patient had significant burden of PVCs at 10 8% metoprolol succinate was uptitrated to 12 5 mg twice daily from daily dosing as patient had what appears to pills anaphylactic reaction lisinopril and repeat Holter and echocardiogram were schedule  Patient has not gotten repeat Holter placed yet however based on these findings will uptitrate metoprolol succinate 25 mg twice daily at this time  Patient notes overall feeling well and denies any chest pain, palpitations, lightheadedness or dizziness, shortness of breath or lower extremity swelling or orthopnea  I will give referral to electrophysiology in the setting of new myopathy and significant PVCs for assistance with managing them as high burden PVCs may be a contributing factor to myopathy  I will have office staff help patient schedule appointment with them  I will be seeing patient on 01/18/2022 as scheduled    Patient thanked me for calling and will see me in follow-up

## 2022-01-17 ENCOUNTER — OFFICE VISIT (OUTPATIENT)
Dept: CARDIOLOGY CLINIC | Facility: CLINIC | Age: 77
End: 2022-01-17
Payer: COMMERCIAL

## 2022-01-17 ENCOUNTER — HOSPITAL ENCOUNTER (OUTPATIENT)
Dept: NON INVASIVE DIAGNOSTICS | Facility: CLINIC | Age: 77
Discharge: HOME | End: 2022-01-17
Payer: COMMERCIAL

## 2022-01-17 VITALS
HEIGHT: 68 IN | WEIGHT: 214 LBS | SYSTOLIC BLOOD PRESSURE: 140 MMHG | DIASTOLIC BLOOD PRESSURE: 88 MMHG | HEART RATE: 81 BPM | BODY MASS INDEX: 32.43 KG/M2

## 2022-01-17 DIAGNOSIS — J42 CHRONIC BRONCHITIS, UNSPECIFIED CHRONIC BRONCHITIS TYPE (HCC): ICD-10-CM

## 2022-01-17 DIAGNOSIS — I25.9 CHRONIC ISCHEMIC HEART DISEASE: Primary | ICD-10-CM

## 2022-01-17 DIAGNOSIS — E66.09 CLASS 1 OBESITY DUE TO EXCESS CALORIES WITH BODY MASS INDEX (BMI) OF 32.0 TO 32.9 IN ADULT, UNSPECIFIED WHETHER SERIOUS COMORBIDITY PRESENT: ICD-10-CM

## 2022-01-17 DIAGNOSIS — I49.3 PVC (PREMATURE VENTRICULAR CONTRACTION): ICD-10-CM

## 2022-01-17 DIAGNOSIS — I10 PRIMARY HYPERTENSION: ICD-10-CM

## 2022-01-17 DIAGNOSIS — I25.10 CORONARY ARTERY DISEASE INVOLVING NATIVE CORONARY ARTERY OF NATIVE HEART WITHOUT ANGINA PECTORIS: ICD-10-CM

## 2022-01-17 DIAGNOSIS — G47.33 OBSTRUCTIVE SLEEP APNEA SYNDROME: ICD-10-CM

## 2022-01-17 DIAGNOSIS — Z95.5 STENTED CORONARY ARTERY: ICD-10-CM

## 2022-01-17 PROCEDURE — 93226 XTRNL ECG REC<48 HR SCAN A/R: CPT

## 2022-01-17 PROCEDURE — 99214 OFFICE O/P EST MOD 30 MIN: CPT | Performed by: INTERNAL MEDICINE

## 2022-01-17 PROCEDURE — 93225 XTRNL ECG REC<48 HRS REC: CPT

## 2022-01-17 NOTE — PROGRESS NOTES
Cardiology Follow Up    Anamaria Petty  [id number]  1945  [facility] [address] [address]      1  Chronic ischemic heart disease     2  Obstructive sleep apnea syndrome     3  Chronic bronchitis, unspecified chronic bronchitis type ([address] )     4  Coronary artery disease involving native coronary artery of native heart without angina pectoris     5  Primary hypertension     6  Stented coronary artery     7  Class 1 obesity due to excess calories with body mass index (BMI) of 32 0 to 32 9 in adult, unspecified whether serious comorbidity present     8  PVC (premature ventricular contraction)         Discussion/Summary:  1  Cardiomyopathy LVEF 40-45%  2  PVCs  3  Hypertension  4  Known coronary artery disease with cardiac catheterization 03/18/2021 with no obstructive lesions seen at that time apart from tiny branch of RPL 3 which was collateralized  5  Hyperlipidemia  6  LAMBERTO      -transthoracic echocardiogram performed 01/14/2022 showing left ventricular systolic function mild to moderately reduced estimated LVEF 40-45% with mild diffuse global hypokinesis and grade 1 diastolic dysfunction with a mildly dilated left atrium mild aortic stenosis with mild-to-moderate aortic insufficiency and mild-to-moderate mitral regurgitation  -Holter monitor 11/18/2021 with ventricular ectopy 10 8% of total monitored beats  -patient tolerating metoprolol succinate 25 mg twice daily at this time  -will repeat Holter monitor  -will follow-up electrophysiology evaluation later this week  -will continue aspirin 81 mg daily, furosemide 40 mg daily, potassium 20 mEq daily, rosuvastatin 40 mg daily    Patient had throat swelling and cough on ACE-inhibitor therefore cannot reinitiate at this time  -counseled patient on dietary lifestyle modifications including following a low-salt low-fat diet and now fluid restriction less than 2000 mL of fluid daily sodium restriction to less than 2000 mg salt daily  -will see patient in 1 month or sooner if necessary  -will continue monitor patient clinically at this time      History of Present Illness:  - patient is a 69-year-old male with hypertension, hyperlipidemia, obesity, COPD with intermittent and occasional oxygen use, known coronary artery disease with MI including events in 1995 with PTCA to RCA and stenting in 1999 and in 2001 involving the RCA and then in 2003 the LAD with recent cardiac catheterization 03/18/2021 showing left main with no significant disease, proximal LAD 10% stenosis at site of prior stent, left circumflex artery with minimal luminal irregularities, mid RCA with 50% stenosis at site of prior stent and posterior lateral segments with distal disease supplied by collaterals from the distal circumflex with no significant coronary artery disease requiring revascularization who presents to the office today for scheduled follow-up  Prior to last visit Holter monitor performed which showed ventricular ectopy consisting of 10 8% of total beats and patient's metoprolol succinate was increased to 25 mg twice daily  -currently in the office today patient denies any symptoms of chest pain, palpitations, lightheadedness or dizziness and has brought his blood pressure logs with him to the office today which are very well controlled with readings in the 110s-130mmHg  Heart rates are in the 80s and patient denies any symptoms at this time      Patient Active Problem List   Diagnosis    Hyperlipidemia    Coronary artery disease involving native coronary artery of native heart without angina pectoris    Bilateral carotid artery stenosis    Hypertension    Oxygen dependent    Depression    [facility]    S/P carotid endarterectomy    Stented coronary artery    Vertigo    Anisometropia    Osteoarthritis of spinal facet joint    Chronic hypokalemia    Chronic ischemic heart disease    Chronic diastolic (congestive) heart failure (HCC)    Diverticular disease of colon    Dry eye syndrome    Gastroesophageal reflux disease    Hearing loss    High prostate specific antigen (PSA)    Hyperglycemia    Hypoxemia    Lens replaced by other means    Lumbar radiculopathy    Multinodular goiter    Nuclear senile cataract    Obesity    Occlusion and stenosis of carotid artery    Osteoarthritis of hip    Prediabetes    Psychosexual dysfunction with inhibited sexual excitement    Sleep apnea    Solitary pulmonary nodule    Thyroid nodule    Visual disturbance    Generalized abdominal pain    Cubital tunnel syndrome on left    Costochondral chest pain    Chest pain in adult    Abnormal nuclear stress test     Past Medical History:   Diagnosis Date    Bilateral carotid artery stenosis     Chronic diastolic heart failure (HCC)     Chronic ischemic heart disease     [facility]) [facility]     Coronary artery disease     hx stents, MI, PCI    Hearing loss     Hyperlipidemia     Hypertension     [facility]     S/P carotid endarterectomy     Sleep apnea     Stented coronary artery      Social History     Socioeconomic History    Marital status: /Civil Union     Spouse name: Not on file    Number of children: Not on file    Years of education: Not on file    Highest education level: Not on file   Occupational History    Not on file   Tobacco Use    Smoking status: Former Smoker     Quit date:      Years since quittin 0    Smokeless tobacco: Never Used   Vaping Use    Vaping Use: Never used   Substance and Sexual Activity    Alcohol use: Never    Drug use: Never    Sexual activity: Not on file   Other Topics Concern    Not on file   Social History Narrative    Not on file     Social Determinants of Health     Financial Resource Strain: Not on file   Food Insecurity: Not on file   Transportation Needs: Not on file   Physical Activity: Not on file   Stress: Not on file   Social Connections: Not on file   Intimate Partner Violence: Not on file   Housing Stability: Not on file      Family History   Problem Relation Age of Onset    Heart attack Mother     Dementia Mother      Past Surgical History:   Procedure Laterality Date    CARDIAC CATHETERIZATION  03/18/2021    left main with no significant disease, proximal LAD with 10% stenosis at the site of prior stent, left circumflex artery with minimal luminal irregularities mid RCA with 50% stenosis at site of prior stent and PL segment with distal disease supplied by collaterals from the distal circumflex with no significant CAD requiring revascularization at that time      COLONOSCOPY      CORONARY ANGIOPLASTY WITH STENT PLACEMENT  1999    RCA    CORONARY ANGIOPLASTY WITH STENT PLACEMENT  2001    RCA    CORONARY ANGIOPLASTY WITH STENT PLACEMENT  2003    LAD    EYE SURGERY      SKIN CANCER EXCISION      arm       Current Outpatient Medications:     albuterol (5 mg/mL) 0 5 % nebulizer solution, 2 5 mg every 6 (six) hours as needed , Disp: , Rfl:     Ascorbic Acid (vitamin C) 1000 MG tablet, Take 1,000 mg by mouth daily, Disp: , Rfl:     aspirin 81 mg chewable tablet, CHEW ONE TABLET BY MOUTH EVERY DAY, Disp: , Rfl:     azithromycin (ZITHROMAX) 250 mg tablet, Take 1 every Monday, Wednesday and Friday , Disp: 45 tablet, Rfl: 3    Blood Pressure Monitor KIT, Use daily, Disp: 1 kit, Rfl: 0    Blood Pressure Monitor KIT, Use daily, Disp: 1 kit, Rfl: 0    famotidine (PEPCID) 20 mg tablet, Take 1 tablet (20 mg total) by mouth daily, Disp: 100 tablet, Rfl: 3    fluticasone (FLONASE) 50 mcg/act nasal spray, into each nostril as needed , Disp: , Rfl:     fluticasone-salmeterol (Advair) 500-50 mcg/dose inhaler, Inhale 1 puff 2 (two) times a day , Disp: , Rfl:     furosemide (LASIX) 40 mg tablet, Take 1 tablet (40 mg total) by mouth daily, Disp: 100 tablet, Rfl: 3    metoprolol succinate (TOPROL-XL) 25 mg 24 hr tablet, Take 1 tablet (25 mg total) by mouth 2 (two) times a day, Disp: 100 tablet, Rfl: 3    Multiple Vitamin (multivitamin) tablet, Take 1 tablet by mouth daily, Disp: , Rfl:     nitroglycerin (NITROSTAT) 0 4 mg SL tablet, DISSOLVE ONE TABLET UNDER THE TONGUE  PRN, Disp: , Rfl:     potassium chloride (Klor-Con) 10 mEq tablet, Take 2 tablets (20 mEq total) by mouth daily, Disp: 200 tablet, Rfl: 3    rosuvastatin (CRESTOR) 40 MG tablet, Take 1 tablet (40 mg total) by mouth daily, Disp: 100 tablet, Rfl: 3    tiZANidine (ZANAFLEX) 4 mg tablet, Take 1 tablet (4 mg total) by mouth every 8 (eight) hours as needed for muscle spasms, Disp: 45 tablet, Rfl: 0    predniSONE 20 mg tablet, Take 2 tablets (40 mg total) by mouth daily (Patient not taking: Reported on 12/20/2021 ), Disp: 10 tablet, Rfl: 0    tiotropium (SPIRIVA) 18 mcg inhalation capsule, INHALE CONTENTS OF ONE CAPSULE BY MOUTH ONCE DAILY USING HANDIHALER DEVICE (Patient not taking: Reported on 1/17/2022), Disp: , Rfl:   Allergies   Allergen Reactions    Lisinopril Swelling and Cough    Tetanus Antitoxin Anaphylaxis    Tetanus Toxoid Anaphylaxis and Swelling         Labs:  Hospital Outpatient Visit on 01/14/2022   Component Date Value    AV area peak brandi 01/14/2022 1 5     MV mean gradient retrogr* 01/14/2022 85     AV peak gradient 01/14/2022 50     LA size 01/14/2022 4 2     Aortic valve mean veloci* 01/14/2022 15 10     Mitral regurgitation pea* 01/14/2022 5 85     Mitral valve mean inflow* 01/14/2022 4 35     TV peak gradient 01/14/2022 29     Tricuspid valve peak reg* 01/14/2022 2 69     LVPWd 01/14/2022 1 00     MV E' Tissue Velocity Se* 01/14/2022 5     IVSd 01/14/2022 8 08     LV DIASTOLIC VOLUME (MOD* 91/22/6553 180     LEFT VENTRICLE SYSTOLIC * 83/79/6122 630  Left ventricular stroke * 01/14/2022 72 00     AV Deceleration Time 01/14/2022 1,432     MV VTI RETROGRADE 01/14/2022 208 4     A4C EF 01/14/2022 47     LVIDd 01/14/2022 6 00     LVIDS 01/14/2022 4 80     FS 01/14/2022 20     Ao root 01/14/2022 3 40     RVID d 01/14/2022 3 0     LVOT mn grad 01/14/2022 2 0     AV area by cont VTI 01/14/2022 1 5     AV regurgitation pressur* 01/14/2022 0 415     AV mean gradient 01/14/2022 10     AV LVOT peak gradient 01/14/2022 4     E wave deceleration time 01/14/2022 130     LVOT diameter 01/14/2022 2 0     LVOT peak vega 01/14/2022 0 97     LVOT peak VTI 01/14/2022 21 69     Ao peak vega retrograde 01/14/2022 2 07     Ao VTI 01/14/2022 45 81     LVOT stroke volume 01/14/2022 68 11     AV peak gradient 01/14/2022 17     MV Peak E Vega 01/14/2022 84     MV Peak A Vega 01/14/2022 1 24     JUAREZ A4C 01/14/2022 17 1     MR PG 01/14/2022 137     RAA A4C 01/14/2022 12 9     LVOT SI 01/14/2022 31 40     LVOT area 01/14/2022 3 14     AV Velocity Ratio 01/14/2022 0 47     AV valve area 01/14/2022 1 49     ZLVIDS 01/14/2022 -2 09     LV EF 01/14/2022 40    Appointment on 11/22/2021   Component Date Value    Sodium 11/22/2021 140     Potassium 11/22/2021 3 9     Chloride 11/22/2021 104     CO2 11/22/2021 31     ANION GAP 11/22/2021 5     BUN 11/22/2021 15     Creatinine 11/22/2021 1 19     Glucose 11/22/2021 101     Calcium 11/22/2021 9 2     eGFR 11/22/2021 59         Imaging: EMG 1 Limb    Result Date: 12/29/2021  Narrative: Julisa Riley MD     12/29/2021  3:47 PM   EMG 1 Limb  Date/Time 12/29/2021 3:38 PM  Performed by  Julisa Riley MD  Authorized by Paola Mora, DO      Echo complete w/ contrast if indicated    Result Date: 1/14/2022  Narrative: [facility]  Left Ventricle: Left ventricular cavity size is mildly dilated  Wall thickness is mildly increased  The left ventricular ejection fraction is 40-45%   Systolic function is mild- moderately reduced  There is mild-moderate global hypokinesis  Diastolic function is mildly abnormal, consistent with grade I (abnormal) relaxation    Left Atrium: The atrium is mildly dilated    Aortic Valve: The leaflets are calcified  There is mildly reduced mobility  There is mild to moderate regurgitation  There is mild stenosis    Mitral Valve: There is annular calcification  There is mild to moderate regurgitation    Tricuspid Valve: There is mild regurgitation  Review of Systems:  Review of Systems   Constitutional: Negative for chills, diaphoresis, fatigue, fever and unexpected weight change  HENT: Negative for trouble swallowing and voice change  Eyes: Negative for pain and redness  Respiratory: Negative for shortness of breath and wheezing  Cardiovascular: Negative for chest pain, palpitations and leg swelling  Gastrointestinal: Negative for abdominal pain, constipation, diarrhea, nausea and vomiting  Genitourinary: Negative for dysuria  Musculoskeletal: Positive for arthralgias  Negative for neck pain and neck stiffness  All other systems reviewed and are negative  Vitals:    01/17/22 1406   BP: 140/88   Pulse: 81   Weight: 97 1 kg (214 lb)   Height: 5' 8" (1 727 m)     Vitals:    01/17/22 1406   Weight: 97 1 kg (214 lb)     Height: 5' 8" (172 7 cm)     Physical Exam:  Physical Exam  Vitals reviewed  Constitutional:       General: He is not in acute distress  Appearance: He is obese  He is not diaphoretic  HENT:      Head: Normocephalic and atraumatic  Eyes:      General:         Right eye: No discharge  Left eye: No discharge  Neck:      Comments: Trachea midline, no JVD present  Cardiovascular:      Rate and Rhythm: Normal rate and regular rhythm  Heart sounds: Murmur heard  No friction rub  Pulmonary:      Effort: Pulmonary effort is normal  No respiratory distress  Breath sounds: Wheezing (Slight end-expiratory wheezing) present     Abdominal: General: Bowel sounds are normal       Palpations: Abdomen is soft  Musculoskeletal:      Right lower leg: No edema  Left lower leg: No edema  Skin:     General: Skin is warm and dry  Neurological:      Mental Status: He is alert        Comments: Awake, alert, able to answer questions appropriately, able to move extremities bilaterally   Psychiatric:         Mood and Affect: Mood normal          Behavior: Behavior normal

## 2022-01-19 ENCOUNTER — OFFICE VISIT (OUTPATIENT)
Dept: FAMILY MEDICINE CLINIC | Facility: CLINIC | Age: 77
End: 2022-01-19
Payer: COMMERCIAL

## 2022-01-19 VITALS
RESPIRATION RATE: 20 BRPM | BODY MASS INDEX: 27.28 KG/M2 | DIASTOLIC BLOOD PRESSURE: 60 MMHG | TEMPERATURE: 99.4 F | HEART RATE: 83 BPM | WEIGHT: 180 LBS | OXYGEN SATURATION: 95 % | SYSTOLIC BLOOD PRESSURE: 108 MMHG | HEIGHT: 68 IN

## 2022-01-19 DIAGNOSIS — K21.9 GASTROESOPHAGEAL REFLUX DISEASE, UNSPECIFIED WHETHER ESOPHAGITIS PRESENT: ICD-10-CM

## 2022-01-19 DIAGNOSIS — M54.16 LUMBAR RADICULOPATHY: ICD-10-CM

## 2022-01-19 DIAGNOSIS — G56.02 CARPAL TUNNEL SYNDROME ON LEFT: ICD-10-CM

## 2022-01-19 DIAGNOSIS — I25.9 CHRONIC ISCHEMIC HEART DISEASE: ICD-10-CM

## 2022-01-19 DIAGNOSIS — R10.9 RIGHT FLANK PAIN: ICD-10-CM

## 2022-01-19 DIAGNOSIS — E78.5 HYPERLIPIDEMIA, UNSPECIFIED HYPERLIPIDEMIA TYPE: ICD-10-CM

## 2022-01-19 DIAGNOSIS — M25.551 RIGHT HIP PAIN: ICD-10-CM

## 2022-01-19 DIAGNOSIS — I10 ESSENTIAL HYPERTENSION: ICD-10-CM

## 2022-01-19 DIAGNOSIS — G56.22 CUBITAL TUNNEL SYNDROME ON LEFT: ICD-10-CM

## 2022-01-19 DIAGNOSIS — L81.9 DISCOLORATION OF SKIN: Primary | ICD-10-CM

## 2022-01-19 PROCEDURE — 3725F SCREEN DEPRESSION PERFORMED: CPT | Performed by: FAMILY MEDICINE

## 2022-01-19 PROCEDURE — 99214 OFFICE O/P EST MOD 30 MIN: CPT | Performed by: FAMILY MEDICINE

## 2022-01-19 RX ORDER — BUDESONIDE AND FORMOTEROL FUMARATE DIHYDRATE 160; 4.5 UG/1; UG/1
2 AEROSOL RESPIRATORY (INHALATION) 2 TIMES DAILY
COMMUNITY
End: 2022-01-20 | Stop reason: ALTCHOICE

## 2022-01-19 NOTE — TELEPHONE ENCOUNTER
Patient requesting refill(s) of: Pepcid     Last filled: 6/8/21 100 tabs 3 refills   Last appt: 1/19/22  Next appt: None   Pharmacy: [facility]      Patient requesting refill(s) of: Lasix     Last filled: 6/8/21 100 tabs 3 refills   Last appt: 1/19/22  Next appt: none  Pharmacy: [facility]     Patient requesting refill(s) of: Potassium Chloride     Last filled: 6/8/21 200 tabs 3 refills   Last appt: 1/19/22  Next appt: None   Pharmacy: [facility]      Patient requesting refill(s) of: Crestor     Last filled: 6/8/21 100 tabs 3 refills   Last appt: 1/19/22  Next appt: None  Pharmacy: [facility]     Patient requesting refill(s) of: Tizanidine     Last filled: 6/8/21 45 tabs 0 refills   Last appt: 1/19/22  Next appt: None   Pharmacy: [address] [place] [address]

## 2022-01-19 NOTE — PROGRESS NOTES
Assessment/Plan:       Problem List Items Addressed This Visit        Nervous and Auditory    Cubital tunnel syndrome on left    Relevant Orders    Ambulatory Referral to Hand Surgery       Other    Discoloration of skin - Primary     - Very slight pink discoloration hands, normal cap refill, sensation intact, normal pulses   - Consider possible Raynaud's? Will monitor          Right flank pain     - No CVA tenderness, he reports intermittent incomplete emptying of bladder and possible retention, will check US kidney bladder to rule out any abnormality          Relevant Orders    US kidney and bladder    Right hip pain     - Will obtain XR right hip, plan pending results          Relevant Orders    XR hip/pelv 2-3 vws right if performed      Other Visit Diagnoses     Carpal tunnel syndrome on left        Relevant Orders    Ambulatory Referral to Hand Surgery            Subjective:      Patient ID: Armando Marquez is a 67 y o  male  HPI     Pink hands- He reports his hands and feet will become pink, as well as very cold  Usually occurs when he's inside,not so much cold weather  He has noticed for months, about 4 months  Pink coloration will come and go, is slight  Right hip pain/flank pain- Bilateral lower back/right flank pain, pain right hip and pain into right leg radiating out  Right leg feels weak chronically  Chronic muscle weakness right following surgery many years ago  Pain is there all the time, pulls, aching pain  Bending over worse  Occurring for months  There seems to be 2 pains, sharp pain of right hip, and more dull aching "deep" pain right flank  EMG results- "This is an abnormal EMG of the left upper extremity due to evidence of a localized neuropathic process involving the ulnar nerve at the elbow consistent with cubital tunnel syndrome as well as entrapment at the wrist 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    In addition a localized neuropathic process involving the median nerve at the wrist consistent with moderate to severe carpal tunnel syndrome with predominant demyelinative change were noted " Symptoms about the same as prior discussion, possibly less severe  Referral to hand surgery  He reports he is currently wearing Holter monitor, ongoing palpitations and chest pains, discussed and being worked up with cardiology, plan for meeting with EP tomorrow  The following portions of the patient's history were reviewed and updated as appropriate: allergies, current medications, past family history, past medical history, past social history, past surgical history, and problem list     Review of Systems   All other systems reviewed and are negative  Objective:      /60 (BP Location: Left arm, Patient Position: Sitting, Cuff Size: Large)   Pulse 83   Temp 99 4 °F (37 4 °C)   Resp 20   Ht 5' 8" (1 727 m)   Wt 81 6 kg (180 lb)   SpO2 95%   BMI 27 37 kg/m²          Physical Exam  Vitals reviewed  Constitutional:       General: He is not in acute distress  Appearance: Normal appearance  He is not ill-appearing, toxic-appearing or diaphoretic  Cardiovascular:      Pulses:           Radial pulses are 2+ on the right side and 2+ on the left side  Pulmonary:      Effort: Pulmonary effort is normal  No respiratory distress  Skin:     General: Skin is warm  Coloration: Skin is not pale  Findings: No erythema or rash  Comments: Slight pink hue to hands bilaterally   Neurological:      General: No focal deficit present  Mental Status: He is alert and oriented to person, place, and time  Sensory: No sensory deficit  Motor: No weakness     Psychiatric:         Mood and Affect: Mood normal          Behavior: Behavior normal              DO Kaleb Stauffer [address] [place] [address] Primary Care

## 2022-01-20 ENCOUNTER — CONSULT (OUTPATIENT)
Dept: CARDIOLOGY CLINIC | Facility: CLINIC | Age: 77
End: 2022-01-20
Payer: COMMERCIAL

## 2022-01-20 ENCOUNTER — TELEPHONE (OUTPATIENT)
Dept: OBGYN CLINIC | Facility: HOSPITAL | Age: 77
End: 2022-01-20

## 2022-01-20 VITALS
DIASTOLIC BLOOD PRESSURE: 70 MMHG | HEART RATE: 84 BPM | SYSTOLIC BLOOD PRESSURE: 116 MMHG | WEIGHT: 211.8 LBS | HEIGHT: 68 IN | BODY MASS INDEX: 32.1 KG/M2

## 2022-01-20 DIAGNOSIS — I49.3 PVC (PREMATURE VENTRICULAR CONTRACTION): ICD-10-CM

## 2022-01-20 DIAGNOSIS — I42.9 CARDIOMYOPATHY, UNSPECIFIED TYPE (HCC): ICD-10-CM

## 2022-01-20 PROBLEM — M25.551 RIGHT HIP PAIN: Status: ACTIVE | Noted: 2022-01-20

## 2022-01-20 PROBLEM — R10.9 RIGHT FLANK PAIN: Status: ACTIVE | Noted: 2022-01-20

## 2022-01-20 PROBLEM — L81.9 DISCOLORATION OF SKIN: Status: ACTIVE | Noted: 2022-01-20

## 2022-01-20 PROCEDURE — 1036F TOBACCO NON-USER: CPT | Performed by: INTERNAL MEDICINE

## 2022-01-20 PROCEDURE — 1160F RVW MEDS BY RX/DR IN RCRD: CPT | Performed by: INTERNAL MEDICINE

## 2022-01-20 PROCEDURE — 99204 OFFICE O/P NEW MOD 45 MIN: CPT | Performed by: INTERNAL MEDICINE

## 2022-01-20 PROCEDURE — 3074F SYST BP LT 130 MM HG: CPT | Performed by: INTERNAL MEDICINE

## 2022-01-20 PROCEDURE — 3078F DIAST BP <80 MM HG: CPT | Performed by: INTERNAL MEDICINE

## 2022-01-20 PROCEDURE — 93000 ELECTROCARDIOGRAM COMPLETE: CPT | Performed by: INTERNAL MEDICINE

## 2022-01-20 RX ORDER — ROSUVASTATIN CALCIUM 40 MG/1
40 TABLET, COATED ORAL DAILY
Qty: 100 TABLET | Refills: 3 | Status: SHIPPED | OUTPATIENT
Start: 2022-01-20

## 2022-01-20 RX ORDER — FUROSEMIDE 40 MG/1
40 TABLET ORAL DAILY
Qty: 100 TABLET | Refills: 3 | Status: SHIPPED | OUTPATIENT
Start: 2022-01-20

## 2022-01-20 RX ORDER — POTASSIUM CHLORIDE 750 MG/1
20 TABLET, FILM COATED, EXTENDED RELEASE ORAL DAILY
Qty: 200 TABLET | Refills: 3 | Status: SHIPPED | OUTPATIENT
Start: 2022-01-20

## 2022-01-20 RX ORDER — TIZANIDINE 4 MG/1
4 TABLET ORAL EVERY 8 HOURS PRN
Qty: 45 TABLET | Refills: 0 | Status: SHIPPED | OUTPATIENT
Start: 2022-01-20 | End: 2022-03-02

## 2022-01-20 RX ORDER — FAMOTIDINE 20 MG/1
20 TABLET, FILM COATED ORAL DAILY
Qty: 100 TABLET | Refills: 3 | Status: SHIPPED | OUTPATIENT
Start: 2022-01-20 | End: 2022-04-27

## 2022-01-20 NOTE — PROGRESS NOTES
EPS Consultation/New Patient Evaluation - Mitchel Gutierrez 67 y o  male MRN: [id number]       Referring:Dr Marin Barrera    CC/HPI:   It was a pleasure to see Mitchel Gutierrez in our arrhythmia clinic at [place] [address]  As you know he is a 67 y o  man with GERD, COPD, CAD, carotid artery stenosis, HTN, chronic diastolic heart failure, EF 40-45%, PVCs, LAMBERTO who presents to discuss PVC management  He had MI in 1995 with PTCA to RCA and stenting in 99, 2001 involving the RCA  Previous Holter monitor (Nov 2021) had shown 10 8%  His metoprolol dose was increased to 25 mg twice daily  TTE showed 40-45%  Repeat Holter monitor was placed few days ago which has not resulted yet  He now presents for PVC management  He presents with his wife also provides part of the history  Patient reports having palpitation for years  He has noted to have fatigue recently however  He also complains of back pain for which he is undergoing workup  He reports having muscle weakness after having initial heart catheterization many years ago  He also suffers from frequent upper respiratory infection and takes azithromycin 3 times a week  He notes fatigue with his daily activities for the past several months  He denies any lightheadedness, dizziness or syncopal episodes        Past Medical History:  Past Medical History:   Diagnosis Date    Bilateral carotid artery stenosis     Chronic diastolic heart failure (HCC)     Chronic ischemic heart disease     COPD (chronic obstructive pulmonary disease) (HCC)     Coronary artery disease     hx stents, MI, PCI    Hearing loss     Hyperlipidemia     Hypertension     MI (myocardial infarction) ([facility] [address] ) 1995    S/P carotid endarterectomy     Sleep apnea     Stented coronary artery        Medications:      Current Outpatient Medications:     albuterol (5 mg/mL) 0 5 % nebulizer solution, 2 5 mg every 6 (six) hours as needed , Disp: , Rfl:     Ascorbic Acid (vitamin C) 1000 MG tablet, Take 1,000 mg by mouth daily, Disp: , Rfl:     aspirin 81 mg chewable tablet, CHEW ONE TABLET BY MOUTH EVERY DAY, Disp: , Rfl:     azithromycin (ZITHROMAX) 250 mg tablet, Take 1 every Monday, Wednesday and Friday , Disp: 45 tablet, Rfl: 3    Blood Pressure Monitor KIT, Use daily, Disp: 1 kit, Rfl: 0    famotidine (PEPCID) 20 mg tablet, Take 1 tablet (20 mg total) by mouth daily, Disp: 100 tablet, Rfl: 3    fluticasone (FLONASE) 50 mcg/act nasal spray, into each nostril as needed , Disp: , Rfl:     fluticasone-salmeterol (Wixela Inhub) 500-50 mcg/dose inhaler, Inhale 1 puff in the morning Rinse mouth after use , Disp: , Rfl:     furosemide (LASIX) 40 mg tablet, Take 1 tablet (40 mg total) by mouth daily, Disp: 100 tablet, Rfl: 3    metoprolol succinate (TOPROL-XL) 25 mg 24 hr tablet, Take 1 tablet (25 mg total) by mouth 2 (two) times a day, Disp: 100 tablet, Rfl: 3    Multiple Vitamin (multivitamin) tablet, Take 1 tablet by mouth daily, Disp: , Rfl:     nitroglycerin (NITROSTAT) 0 4 mg SL tablet, DISSOLVE ONE TABLET UNDER THE TONGUE  PRN, Disp: , Rfl:     potassium chloride (Klor-Con) 10 mEq tablet, Take 2 tablets (20 mEq total) by mouth daily, Disp: 200 tablet, Rfl: 3    rosuvastatin (CRESTOR) 40 MG tablet, Take 1 tablet (40 mg total) by mouth daily, Disp: 100 tablet, Rfl: 3    Tiotropium Bromide Monohydrate (SPIRIVA RESPIMAT IN), Inhale, Disp: , Rfl:     tiZANidine (ZANAFLEX) 4 mg tablet, Take 1 tablet (4 mg total) by mouth every 8 (eight) hours as needed for muscle spasms, Disp: 45 tablet, Rfl: 0    Blood Pressure Monitor KIT, Use daily, Disp: 1 kit, Rfl: 0     Family History   Problem Relation Age of Onset    Heart attack Mother     Dementia Mother      Social History     Socioeconomic History    Marital status: /Civil Union     Spouse name: Not on file    Number of children: Not on file    Years of education: Not on file    Highest education level: Not on file Occupational History    Not on file   Tobacco Use    Smoking status: Former Smoker     Quit date:      Years since quittin 0    Smokeless tobacco: Never Used   Vaping Use    Vaping Use: Never used   Substance and Sexual Activity    Alcohol use: Never    Drug use: Never    Sexual activity: Not on file   Other Topics Concern    Not on file   Social History Narrative    Not on file     Social Determinants of Health     Financial Resource Strain: Not on file   Food Insecurity: Not on file   Transportation Needs: Not on file   Physical Activity: Not on file   Stress: Not on file   Social Connections: Not on file   Intimate Partner Violence: Not on file   Housing Stability: Not on file     Social History     Tobacco Use   Smoking Status Former Smoker    Quit date: [address] Years since quittin 0   Smokeless Tobacco Never Used     Social History     Substance and Sexual Activity   Alcohol Use Never       Review of Systems   Constitutional: Positive for malaise/fatigue  Negative for chills and fever  HENT: Negative  Eyes: Negative for blurred vision and double vision  Cardiovascular: Positive for dyspnea on exertion and palpitations  Negative for chest pain, leg swelling, near-syncope, orthopnea, paroxysmal nocturnal dyspnea and syncope  Respiratory: Negative for cough and sputum production  Endocrine: Negative  Skin: Negative  Negative for rash  Musculoskeletal: Positive for arthritis and back pain  Negative for joint pain  Gastrointestinal: Negative for abdominal pain, nausea and vomiting  Genitourinary: Negative  Neurological: Negative  Negative for dizziness and light-headedness  Psychiatric/Behavioral: Negative  The patient is not nervous/anxious  Objective:     Vitals: Blood pressure 116/70, pulse 84, height 5' 8" (1 727 m), weight 96 1 kg (211 lb 12 8 oz)  , Body mass index is 32 2 kg/m²  ,        Physical Exam:    GEN: Nhan Jung appears well, alert and oriented x 3, pleasant and cooperative   HEENT: pupils equal, round, and reactive to light; extraocular muscles intact  NECK: supple, no carotid bruits   HEART: regular rhythm, normal S1 and S2, no murmurs, clicks, gallops or rubs   LUNGS: clear to auscultation bilaterally; no wheezes, rales, or rhonchi   ABDOMEN: normal bowel sounds, soft, no tenderness, no distention  EXTREMITIES: peripheral pulses normal; no clubbing, cyanosis, or edema  NEURO: no focal findings   SKIN: normal without suspicious lesions on exposed skin      Labs & Results:  Below is the patient's most recent value for Albumin, ALT, AST, BUN, Calcium, Chloride, Cholesterol, CO2, Creatinine, GFR, Glucose, HDL, Hematocrit, Hemoglobin, Hemoglobin A1C, LDL, Magnesium, Phosphorus, Platelets, Potassium, PSA, Sodium, Triglycerides, and WBC  Lab Results   Component Value Date    ALT 25 2021    AST 23 2021    BUN 15 2021    CALCIUM 9 2 2021     2021    CO2 31 2021    CREATININE 1 19 2021    HDL 42 2021    HCT 42 9 2021    HGB 14 4 2021    HGBA1C 5 7 (H) 2021     2021    K 3 9 2021    TRIG 94 2021    WBC 9 50 2021     Note: for a comprehensive list of the patient's lab results, access the Results Review activity  Cardiac testing:     I personally reviewed the ECG performed in the clinic on 22   It reveals sinus rhythm with sinus arrhythmias and PVCs of 3 different morphologies    Echocardiograms:  Results for orders placed during the hospital encounter of 20    Echo complete with contrast if indicated    Narrative  [facility] [email or web address] [address]    Transthoracic Echocardiogram  2D, M-mode, Doppler, and Color Doppler    Study date:  11-Dec-2020    Patient: Elizabeth Hernandez  MR number: [id number]  Account number: [de-identified]  : 1945  Age: 67 years  Gender: Male  Status: Outpatient  Location: [facility]  Height: 68 in  Weight: 214 lb  BP: 130/ 86 mmHg    Indications: CAD    Diagnoses: I25 10 - Atherosclerotic heart disease of native coronary artery without angina pectoris    Sonographer:  Kyle Kc RDCS  Referring Physician:  Silvano Benavidez MD  Group:  [facility]  Interpreting Physician:  Bj Loya MD    SUMMARY    LEFT VENTRICLE:  Size was normal   Systolic function was normal  Ejection fraction was estimated to be 55 %  There were no regional wall motion abnormalities  Doppler parameters were consistent with abnormal left ventricular relaxation (grade 1 diastolic dysfunction)  VENTRICULAR SEPTUM:  There was dyssynergic motion  MITRAL VALVE:  There was mild regurgitation  AORTIC VALVE:  Transaortic velocity was increased due to valvular stenosis  There was mild stenosis  There was trace regurgitation  Valve mean gradient was 11 mmHg  HISTORY: PRIOR HISTORY: Carotiid Stenosis    PROCEDURE: The study was performed in the [facility]  This was a routine study  The transthoracic approach was used  The study included complete 2D imaging, M-mode, complete spectral Doppler, and color Doppler  The  heart rate was 90 bpm, at the start of the study  Image quality was adequate  LEFT VENTRICLE: Size was normal  Systolic function was normal  Ejection fraction was estimated to be 55 %  There were no regional wall motion abnormalities  Wall thickness was normal  DOPPLER: Doppler parameters were consistent with  abnormal left ventricular relaxation (grade 1 diastolic dysfunction)  VENTRICULAR SEPTUM: There was dyssynergic motion  RIGHT VENTRICLE: The size was normal  Systolic function was normal  Wall thickness was normal     LEFT ATRIUM: Size was normal     RIGHT ATRIUM: Size was normal     MITRAL VALVE: Valve structure was normal  There was normal leaflet separation   DOPPLER: The transmitral velocity was within the normal range  There was no evidence for stenosis  There was mild regurgitation  AORTIC VALVE: The valve was trileaflet  Leaflets exhibited mildly increased thickness, mild calcification, and mildly reduced cuspal separation  DOPPLER: Transaortic velocity was increased due to valvular stenosis  There was mild stenosis  There was trace regurgitation  TRICUSPID VALVE: The valve structure was normal  There was normal leaflet separation  DOPPLER: The transtricuspid velocity was within the normal range  There was no evidence for stenosis  There was no significant regurgitation  PULMONIC VALVE: Leaflets exhibited normal thickness, no calcification, and normal cuspal separation  DOPPLER: The transpulmonic velocity was within the normal range  There was no significant regurgitation  PERICARDIUM: There was no pericardial effusion  The pericardium was normal in appearance  AORTA: The root exhibited normal size  SYSTEMIC VEINS: IVC: The inferior vena cava was normal in size  MEASUREMENT TABLES    2D MEASUREMENTS  LVOT   (Reference normals)  Diam   21 mm   (--)    DOPPLER MEASUREMENTS  Aortic valve   (Reference normals)  Peak brandi   219 cm/s   (--)  Mean brandi   154 cm/s   (--)  VTI   45 cm   (--)  Peak gradient   19 mmHg   (--)  Mean gradient   11 mmHg   (--)    SYSTEM MEASUREMENT TABLES    2D  %FS: 30 06 %  Ao Diam: 3 04 cm  EDV(Teich): 149 08 ml  EF(Teich): 56 72 %  ESV(Teich): 64 52 ml  IVSd: 1 05 cm  LA Area: 19 47 cm2  LA Diam: 4 22 cm  LVEDV MOD A4C: 153 32 ml  LVEF MOD A4C: 67 95 %  LVESV MOD A4C: 49 14 ml  LVIDd: 5 53 cm  LVIDs: 3 87 cm  LVLd A4C: 10 13 cm  LVLs A4C: 7 17 cm  LVOT Diam: 2 16 cm  LVPWd: 1 02 cm  RA Area: 16 54 cm2  RVIDd: 2 26 cm  SV MOD A4C: 104 17 ml  SV(Teich): 84 56 ml    CW  AV Env  Ti: 304 82 ms  AV VTI: 47 44 cm  AV Vmax: 2 19 m/s  AV Vmean: 1 55 m/s  AV maxP 14 mmHg  AV meanP 03 mmHg    MM  TAPSE: 1 9 cm    PW  CAMACHO (VTI): 1 55 cm2  CAMACHO Vmax: 1 52 cm2  LVOT Env  Ti: 309 71 ms  LVOT VTI: 20 22 cm  LVOT Vmax: 0 91 m/s  LVOT Vmean: 0 66 m/s  LVOT maxPG: 3 35 mmHg  LVOT meanP 97 mmHg  LVSV Dopp: 73 74 ml  MV A Vega: 1 31 m/s  MV Dec Alexander: 2 58 m/s2  MV DecT: 266 88 ms  MV E Vega: 0 69 m/s  MV E/A Ratio: 0 52  MV PHT: 77 4 ms  MVA By PHT: 2 84 cm2    [facility]    Prepared and electronically signed by    Alysa Coronado MD  Signed 11-Dec-2020 18:58:11    No results found for this or any previous visit  Catheterizations:   No results found for this or any previous visit  Stress Tests:  No results found for this or any previous visit  Holter monitor -   No results found for this or any previous visit  No results found for this or any previous visit  ASSESSMENT/PLAN:    1  PVC  · Patient had 10% burden on Holter monitor in in 2021   · He currently feels fatigue and palpitations  · Echocardiogram had shown ejection fraction of 45%   · Left heart catheterization in 2021 showed 50% stenosis at prior stent site  · Discuss PVC management with antiarrhythmic therapy versus ablation   · Ablation is not a great option given multiple different morphology of PVCs   · After answering the questions about antiarrhythmic therapies which include sotalol and amiodarone patient opted to proceed with sotalol admission  · Patient is on azithromycin 3 times a week which has helped him reduce upper respiratory infection therefore we will avoid sotalol unless patient has more than 10% burden on the recent Holter monitor   · Patient will let us know once the Holter monitor has resulted and then will make decision about sotalol admission  ·   2  Coronary artery disease  · Patient had prior PCI to RCA artery   · In total patient reports having 10 stents  · Maintained on metoprolol 25 mg twice daily, Crestor 40 mg daily, aspirin 81 mg daily  · Denies angina    3   Carotid artery stenosis   · Maintained on Crestor 40 mg daily    4  Hypertension   · Normotensive in the office   · Maintained on diet control    5   COPD   · Patient has history of multiple infection leading to pneumonia   · Currently takes azithromycin 3 times a week and has not had significant infection   · May have to come of azithromycin if patient is starting sotalol and will have to try another antibiotic  · Maintained on Spiriva, Wixela inhaler, Flonase and albuterol as needed    Follow-up in 3 month

## 2022-01-20 NOTE — PATIENT INSTRUCTIONS
Continue taking metoprolol    Let us discuss next plan after your Holter monitor results - either stay on metoprolol or admission for sotalol (we will have to stop azithromycin)

## 2022-01-20 NOTE — TELEPHONE ENCOUNTER
Email also sent to [facility]:  Hello,  Please advise if the following patient can be forced onto the schedule:  Patient:  Ivania Mccoy   :  [birth year]  MRN:  [id number]  Call back # [phone], please call tomorrow  Reason for appointment:  Patient was referred for lt cubital/carpal tunnel  Patient had emg  Requested doctor/location:  [place], please  Thank you in advance!

## 2022-01-21 NOTE — ASSESSMENT & PLAN NOTE
- No CVA tenderness, he reports intermittent incomplete emptying of bladder and possible retention, will check US kidney bladder to rule out any abnormality

## 2022-01-21 NOTE — ASSESSMENT & PLAN NOTE
- Very slight pink discoloration hands, normal cap refill, sensation intact, normal pulses   - Consider possible Raynaud's?  Will monitor

## 2022-01-25 PROCEDURE — 93227 XTRNL ECG REC<48 HR R&I: CPT | Performed by: INTERNAL MEDICINE

## 2022-01-26 ENCOUNTER — TELEPHONE (OUTPATIENT)
Dept: CARDIOLOGY CLINIC | Facility: CLINIC | Age: 77
End: 2022-01-26

## 2022-01-26 NOTE — TELEPHONE ENCOUNTER
I attempted to call patient to go over results of recent holter monitor, but unfortunately I was not able to reach him  I did leave a message for him with my name and office number to call for a better time to speak

## 2022-02-03 ENCOUNTER — TELEPHONE (OUTPATIENT)
Dept: NON INVASIVE DIAGNOSTICS | Facility: HOSPITAL | Age: 77
End: 2022-02-03

## 2022-02-03 ENCOUNTER — HOSPITAL ENCOUNTER (OUTPATIENT)
Dept: ULTRASOUND IMAGING | Facility: HOSPITAL | Age: 77
Discharge: HOME | End: 2022-02-03
Payer: COMMERCIAL

## 2022-02-03 ENCOUNTER — HOSPITAL ENCOUNTER (OUTPATIENT)
Dept: RADIOLOGY | Facility: HOSPITAL | Age: 77
Discharge: HOME | End: 2022-02-03
Payer: COMMERCIAL

## 2022-02-03 DIAGNOSIS — I10 ESSENTIAL HYPERTENSION: ICD-10-CM

## 2022-02-03 DIAGNOSIS — M25.551 RIGHT HIP PAIN: ICD-10-CM

## 2022-02-03 DIAGNOSIS — R10.9 RIGHT FLANK PAIN: ICD-10-CM

## 2022-02-03 PROCEDURE — 76770 US EXAM ABDO BACK WALL COMP: CPT

## 2022-02-03 PROCEDURE — 73502 X-RAY EXAM HIP UNI 2-3 VIEWS: CPT

## 2022-02-03 RX ORDER — METOPROLOL SUCCINATE 25 MG/1
37.5 TABLET, EXTENDED RELEASE ORAL 2 TIMES DAILY
Qty: 100 TABLET | Refills: 3
Start: 2022-02-03 | End: 2022-02-17

## 2022-02-03 NOTE — TELEPHONE ENCOUNTER
Patient was able to call back to go results of recent Holter monitor  As patient still appear symptomatic from his PVCs will uptitrate metoprolol succinate to 37 5 mg twice daily from 25 mg twice daily and see how patient tolerates  I will see patient on 02/17/2022 or sooner if necessary and patient was instructed to call the office if there are any issues or if significant issues to seek emergency medical care  Patient noted complete understanding and will uptitrate medical therapy and follow-up as planned

## 2022-02-11 DIAGNOSIS — J44.9 CHRONIC OBSTRUCTIVE PULMONARY DISEASE, UNSPECIFIED COPD TYPE (HCC): ICD-10-CM

## 2022-02-11 NOTE — TELEPHONE ENCOUNTER
Patient requesting refill(s) of:  Zithromax 250Mg   Last filled:6/8/21  Last appt:1/19/22  Next appt:None  Pharmacy:[facility]

## 2022-02-14 RX ORDER — AZITHROMYCIN 250 MG/1
TABLET, FILM COATED ORAL
Qty: 45 TABLET | Refills: 3 | Status: SHIPPED | OUTPATIENT
Start: 2022-02-14 | End: 2023-02-10

## 2022-02-17 ENCOUNTER — OFFICE VISIT (OUTPATIENT)
Dept: CARDIOLOGY CLINIC | Facility: CLINIC | Age: 77
End: 2022-02-17
Payer: COMMERCIAL

## 2022-02-17 VITALS
SYSTOLIC BLOOD PRESSURE: 120 MMHG | DIASTOLIC BLOOD PRESSURE: 64 MMHG | HEIGHT: 68 IN | HEART RATE: 70 BPM | WEIGHT: 214 LBS | BODY MASS INDEX: 32.43 KG/M2

## 2022-02-17 DIAGNOSIS — E78.2 MIXED HYPERLIPIDEMIA: ICD-10-CM

## 2022-02-17 DIAGNOSIS — I10 ESSENTIAL HYPERTENSION: ICD-10-CM

## 2022-02-17 DIAGNOSIS — I25.9 CHRONIC ISCHEMIC HEART DISEASE: Primary | ICD-10-CM

## 2022-02-17 DIAGNOSIS — I10 PRIMARY HYPERTENSION: ICD-10-CM

## 2022-02-17 DIAGNOSIS — I65.23 BILATERAL CAROTID ARTERY STENOSIS: ICD-10-CM

## 2022-02-17 DIAGNOSIS — E66.09 CLASS 1 OBESITY DUE TO EXCESS CALORIES WITH BODY MASS INDEX (BMI) OF 32.0 TO 32.9 IN ADULT, UNSPECIFIED WHETHER SERIOUS COMORBIDITY PRESENT: ICD-10-CM

## 2022-02-17 DIAGNOSIS — I25.10 CORONARY ARTERY DISEASE INVOLVING NATIVE CORONARY ARTERY OF NATIVE HEART WITHOUT ANGINA PECTORIS: ICD-10-CM

## 2022-02-17 DIAGNOSIS — I50.32 CHRONIC DIASTOLIC (CONGESTIVE) HEART FAILURE (HCC): ICD-10-CM

## 2022-02-17 PROCEDURE — 1036F TOBACCO NON-USER: CPT | Performed by: INTERNAL MEDICINE

## 2022-02-17 PROCEDURE — 3078F DIAST BP <80 MM HG: CPT | Performed by: INTERNAL MEDICINE

## 2022-02-17 PROCEDURE — 99214 OFFICE O/P EST MOD 30 MIN: CPT | Performed by: INTERNAL MEDICINE

## 2022-02-17 PROCEDURE — 3074F SYST BP LT 130 MM HG: CPT | Performed by: INTERNAL MEDICINE

## 2022-02-17 PROCEDURE — 1160F RVW MEDS BY RX/DR IN RCRD: CPT | Performed by: INTERNAL MEDICINE

## 2022-02-17 RX ORDER — METOPROLOL SUCCINATE 25 MG/1
50 TABLET, EXTENDED RELEASE ORAL 2 TIMES DAILY
Qty: 100 TABLET | Refills: 3
Start: 2022-02-17 | End: 2022-03-11 | Stop reason: SDUPTHER

## 2022-02-17 NOTE — PROGRESS NOTES
Cardiology Follow Up    Jemma Napoles  [id number]  1945  [facility] [address] [address]      1  Chronic ischemic heart disease     2  Chronic diastolic (congestive) heart failure ([address] )     3  Primary hypertension     4  Bilateral carotid artery stenosis     5  Coronary artery disease involving native coronary artery of native heart without angina pectoris     6  Mixed hyperlipidemia     7  Class 1 obesity due to excess calories with body mass index (BMI) of 32 0 to 32 9 in adult, unspecified whether serious comorbidity present         Discussion/Summary:  1  Cardiomyopathy LVEF 40-45%  2  Frequent PVCs  3  Hypertension  4  Known coronary artery disease with cardiac catheterization 03/18/2021 with no disease needing revascularization at that time  5  Hyperlipidemia  6   Obstructive sleep apnea    -blood pressure readings the patient brought with him to the office today appear relatively stable in the 857-557 mmHg systolic range with heart rates in the 70s and 80s  -in that setting will uptitrate metoprolol succinate to 50 mg twice daily  -will repeat Holter monitor in 1 week to follow PVC burden  -patient will continue aspirin 81 mg daily, furosemide 40 mg daily, 20 mEq potassium daily, Crestor 40 mg daily  -will check BMP and magnesium levels  -will continue monitor patient closely at this time will see him in 1 month for follow-up or sooner if necessary  -patient counseled on dietary lifestyle modifications including following a low-salt low-fat heart healthy diet with monitoring of home blood pressures and heart rates along with daily weights  -patient counseled that if he were to have any warning or alarm type symptoms he is to seek emergency medical care immediately      History of Present Illness:  - patient is a 79-year-old male with hypertension, hyperlipidemia, obesity, COPD with intermittent and occasional oxygen use, obstructive sleep apnea compliant with CPAP therapy with known coronary artery disease with MI including events in 1995 with PTCA to RCA, stenting in 1999 and in 2001 involving the RCA and then in 2003 with stenting to the LAD with recent cardiac catheterization in March 18, 2021 showing left main with no significant disease, proximal LAD 10% stenosis at site of prior stent, left circumflex artery with minimal luminal irreg for scheduled follow-up   ularities, mid RCA with 50% stenosis at site of prior stent and posterolateral segments with distal disease supplied by collaterals from the distal circumflex with no significant coronary artery disease requiring revascularization at that time who presents to the office today   -patient was sent to be seen by electrophysiology for frequent PVCs and in the setting of requiring chronic antibiotic therapy was recommended that if able to tolerate to recommended uptitration of beta-blocker therapy prior to transitioning to alternative antiarrhythmic therapy with continued monitoring of patient  -currently in the office he denies any chest pain, palpitations, lightheadedness or dizziness, loss of consciousness, shortness of breath, lower extremity swelling  He states that overall he is doing generally well except he has been having some difficulty falling asleep at night  When he does fall asleep E does generally sleep for the appropriate amount of time anywhere from 6-8 hours but this has been disconcerting as he had a much better sleep schedule that he was more accustomed to prior to this and feels like this is only recently popped up in the last 4-6 months      Patient Active Problem List   Diagnosis    Hyperlipidemia    Coronary artery disease involving native coronary artery of native heart without angina pectoris    Bilateral carotid artery stenosis    Hypertension    Oxygen dependent    Depression    [facility]) [facility]    S/P carotid endarterectomy    Stented coronary artery    Vertigo    Anisometropia    Osteoarthritis of spinal facet joint    Chronic hypokalemia    Chronic ischemic heart disease    Chronic diastolic (congestive) heart failure ([facility])    Diverticular disease of colon    Dry eye syndrome    Gastroesophageal reflux disease    Hearing loss    High prostate specific antigen (PSA)    Hyperglycemia    Hypoxemia    Lens replaced by other means    Lumbar radiculopathy    Multinodular goiter    Nuclear senile cataract    Obesity    Occlusion and stenosis of carotid artery    Osteoarthritis of hip    Prediabetes    Psychosexual dysfunction with inhibited sexual excitement    Sleep apnea    Solitary pulmonary nodule    Thyroid nodule    Visual disturbance    Generalized abdominal pain    Cubital tunnel syndrome on left    Costochondral chest pain    Chest pain in adult    Abnormal nuclear stress test    Discoloration of skin    Right flank pain    Right hip pain     Past Medical History:   Diagnosis Date    Bilateral carotid artery stenosis     Chronic diastolic heart failure ([facility])     Chronic ischemic heart disease     COPD (chronic obstructive pulmonary disease) ([facility])     Coronary artery disease     hx stents, MI, PCI    Hearing loss     Hyperlipidemia     Hypertension     MI [facility] infarction) [facility]     S/P carotid endarterectomy     Sleep apnea     Stented coronary artery      Social History     Socioeconomic History    Marital status: /Civil Union     Spouse name: Not on file    Number of children: Not on file    Years of education: Not on file    Highest education level: Not on file   Occupational History    Not on file   Tobacco Use    Smoking status: Former Smoker     Quit date:      Years since quittin 1    Smokeless tobacco: Never Used   Vaping Use    Vaping Use: Never used   Substance and Sexual Activity    Alcohol use: Never    Drug use: Never    Sexual activity: Not on file   Other Topics Concern    Not on file   Social History Narrative    Not on file     Social Determinants of Health     Financial Resource Strain: Not on file   Food Insecurity: Not on file   Transportation Needs: Not on file   Physical Activity: Not on file   Stress: Not on file   Social Connections: Not on file   Intimate Partner Violence: Not on file   Housing Stability: Not on file      Family History   Problem Relation Age of Onset    Heart attack Mother     Dementia Mother      Past Surgical History:   Procedure Laterality Date    CARDIAC CATHETERIZATION  03/18/2021    left main with no significant disease, proximal LAD with 10% stenosis at the site of prior stent, left circumflex artery with minimal luminal irregularities mid RCA with 50% stenosis at site of prior stent and PL segment with distal disease supplied by collaterals from the distal circumflex with no significant CAD requiring revascularization at that time      COLONOSCOPY      CORONARY ANGIOPLASTY WITH STENT PLACEMENT  1999    RCA    CORONARY ANGIOPLASTY WITH STENT PLACEMENT  2001    RCA    CORONARY ANGIOPLASTY WITH STENT PLACEMENT  2003    LAD    EYE SURGERY      SKIN CANCER EXCISION      arm       Current Outpatient Medications:     albuterol (5 mg/mL) 0 5 % nebulizer solution, 2 5 mg every 6 (six) hours as needed , Disp: , Rfl:     Ascorbic Acid (vitamin C) 1000 MG tablet, Take 1,000 mg by mouth daily, Disp: , Rfl:     aspirin 81 mg chewable tablet, CHEW ONE TABLET BY MOUTH EVERY DAY, Disp: , Rfl:     azithromycin (ZITHROMAX) 250 mg tablet, Take 1 every Monday, Wednesday and Friday , Disp: 45 tablet, Rfl: 3    Blood Pressure Monitor KIT, Use daily, Disp: 1 kit, Rfl: 0    famotidine (PEPCID) 20 mg tablet, Take 1 tablet (20 mg total) by mouth daily, Disp: 100 tablet, Rfl: 3    fluticasone (FLONASE) 50 mcg/act nasal spray, into each nostril as needed , Disp: , Rfl:     fluticasone-salmeterol (Wixela Inhub) 500-50 mcg/dose inhaler, Inhale 1 puff in the morning Rinse mouth after use , Disp: , Rfl:     furosemide (LASIX) 40 mg tablet, Take 1 tablet (40 mg total) by mouth daily, Disp: 100 tablet, Rfl: 3    metoprolol succinate (TOPROL-XL) 25 mg 24 hr tablet, Take 1 5 tablets (37 5 mg total) by mouth 2 (two) times a day, Disp: 100 tablet, Rfl: 3    Multiple Vitamin (multivitamin) tablet, Take 1 tablet by mouth daily, Disp: , Rfl:     nitroglycerin (NITROSTAT) 0 4 mg SL tablet, DISSOLVE ONE TABLET UNDER THE TONGUE  PRN, Disp: , Rfl:     potassium chloride (Klor-Con) 10 mEq tablet, Take 2 tablets (20 mEq total) by mouth daily, Disp: 200 tablet, Rfl: 3    rosuvastatin (CRESTOR) 40 MG tablet, Take 1 tablet (40 mg total) by mouth daily, Disp: 100 tablet, Rfl: 3    Tiotropium Bromide Monohydrate (SPIRIVA RESPIMAT IN), Inhale, Disp: , Rfl:     tiZANidine (ZANAFLEX) 4 mg tablet, Take 1 tablet (4 mg total) by mouth every 8 (eight) hours as needed for muscle spasms, Disp: 45 tablet, Rfl: 0  Allergies   Allergen Reactions    Lisinopril Swelling and Cough    Tetanus Antitoxin Anaphylaxis    Tetanus Toxoid Anaphylaxis and Swelling         Labs:  Hospital Outpatient Visit on 01/14/2022   Component Date Value    AV area peak brandi 01/14/2022 1 5     MV mean gradient retrogr* 01/14/2022 85     AV peak gradient 01/14/2022 50     LA size 01/14/2022 4 2     Aortic valve mean veloci* 01/14/2022 15 10     Mitral regurgitation pea* 01/14/2022 5 85     Mitral valve mean inflow* 01/14/2022 4 35     TV peak gradient 01/14/2022 29     Tricuspid valve peak reg* 01/14/2022 2 69     LVPWd 01/14/2022 1 00     MV E' Tissue Velocity Se* 01/14/2022 5     IVSd 01/14/2022 4 16     LV DIASTOLIC VOLUME (MOD* 16/88/2387 180     LEFT VENTRICLE SYSTOLIC * 90/23/6492 544     Left ventricular stroke * 01/14/2022 72 00     AV Deceleration Time 01/14/2022 1,432     MV VTI RETROGRADE 01/14/2022 208 4     A4C EF 01/14/2022 47     LVIDd 01/14/2022 6 00     LVIDS 01/14/2022 4 80     FS 01/14/2022 20     Ao root 01/14/2022 3 40     RVID d 01/14/2022 3 0     LVOT mn grad 01/14/2022 2 0     AV area by cont VTI 01/14/2022 1 5     AV regurgitation pressur* 01/14/2022 0 415     AV mean gradient 01/14/2022 10     AV LVOT peak gradient 01/14/2022 4     E wave deceleration time 01/14/2022 130     LVOT diameter 01/14/2022 2 0     LVOT peak vega 01/14/2022 0 97     LVOT peak VTI 01/14/2022 21 69     Ao peak vega retrograde 01/14/2022 2 07     Ao VTI 01/14/2022 45 81     LVOT stroke volume 01/14/2022 68 11     AV peak gradient 01/14/2022 17     MV Peak E Vega 01/14/2022 84     MV Peak A Vega 01/14/2022 1 24     JUAREZ A4C 01/14/2022 17 1     MR PG 01/14/2022 137     RAA A4C 01/14/2022 12 9     LVOT SI 01/14/2022 31 40     LVOT area 01/14/2022 3 14     AV Velocity Ratio 01/14/2022 0 47     AV valve area 01/14/2022 1 49     ZLVIDS 01/14/2022 -2 09     LV EF 01/14/2022 40         Imaging: XR hip/pelv 2-3 vws right if performed    Result Date: 2/4/2022  Narrative: RIGHT HIP INDICATION:   M25 551: Pain in right hip  COMPARISON:  None VIEWS:  XR HIP/PELV 2-3 VWS RIGHT W PELVIS IF PERFORMED FINDINGS: There is no acute fracture or dislocation  Bilateral mild degenerative hip joints No lytic or blastic osseous lesion  Soft tissues are unremarkable  Degenerative lower lumbar spine and pubic symphysis  Impression: No acute osseous abnormality  Workstation performed: [id number]     US kidney and bladder    Result Date: 2/8/2022  Narrative: RENAL ULTRASOUND INDICATION:   R10 9: Unspecified abdominal pain  COMPARISON: None TECHNIQUE:   Ultrasound of the retroperitoneum was performed with a curvilinear transducer utilizing volumetric sweeps and still imaging techniques  FINDINGS: KIDNEYS: Symmetric and normal size  Right kidney:  10 8 x 6 2 x 5 6 cm   Left kidney:  11 6 x 4 9 x 3 6 cm  Right kidney Normal echogenicity and contour  No suspicious masses detected  9 mm simple right renal cyst is noted  No hydronephrosis  No shadowing calculi  No perinephric fluid collections  Left kidney Normal echogenicity and contour  No suspicious masses detected  4 mm left renal cyst is noted  No hydronephrosis  No shadowing calculi  No perinephric fluid collections  URETERS: Nonvisualized  BLADDER: Normally distended  No focal thickening or mass lesions  Bilateral ureteral jets detected  Impression: Subcentimeter renal cysts, otherwise normal study  Workstation performed: [id number]       Review of Systems:  Review of Systems   Constitutional: Negative for chills, diaphoresis, fatigue, fever and unexpected weight change  HENT: Negative for trouble swallowing and voice change  Eyes: Negative for pain and redness  Respiratory: Negative for shortness of breath and wheezing  Cardiovascular: Negative for chest pain, palpitations and leg swelling  Gastrointestinal: Negative for abdominal pain, constipation, diarrhea, nausea and vomiting  Genitourinary: Negative for dysuria  Musculoskeletal: Negative for neck pain and neck stiffness  Skin: Negative for rash  Neurological: Negative for dizziness, syncope, light-headedness and headaches  Psychiatric/Behavioral: Negative for agitation and confusion  All other systems reviewed and are negative  Vitals:    02/17/22 1405   BP: 120/64   Pulse: 70   Weight: 97 1 kg (214 lb)   Height: 5' 8" (1 727 m)     Vitals:    02/17/22 1405   Weight: 97 1 kg (214 lb)     Height: 5' 8" (172 7 cm)     Physical Exam:  Physical Exam  Vitals reviewed  Constitutional:       General: He is not in acute distress  Appearance: He is obese  He is not diaphoretic  HENT:      Head: Normocephalic and atraumatic  Eyes:      General:         Right eye: No discharge  Left eye: No discharge     Neck:      Comments: Trachea midline, no JVD present  Cardiovascular:      Rate and Rhythm: Normal rate and regular rhythm  Heart sounds: No friction rub  Pulmonary:      Effort: Pulmonary effort is normal  No respiratory distress  Breath sounds: No wheezing  Comments: Decreased breath sounds bilaterally  Abdominal:      General: Bowel sounds are normal       Palpations: Abdomen is soft  Tenderness: There is no abdominal tenderness  Musculoskeletal:      Right lower leg: No edema  Left lower leg: No edema  Skin:     General: Skin is warm and dry  Neurological:      Mental Status: He is alert        Comments: Awake, alert, able answer questions appropriately, able move extremities bilaterally   Psychiatric:         Mood and Affect: Mood normal          Behavior: Behavior normal

## 2022-02-24 ENCOUNTER — HOSPITAL ENCOUNTER (OUTPATIENT)
Dept: NON INVASIVE DIAGNOSTICS | Facility: CLINIC | Age: 77
Discharge: HOME | End: 2022-02-24
Payer: COMMERCIAL

## 2022-02-24 DIAGNOSIS — I25.9 CHRONIC ISCHEMIC HEART DISEASE: ICD-10-CM

## 2022-02-24 PROCEDURE — 93226 XTRNL ECG REC<48 HR SCAN A/R: CPT

## 2022-02-24 PROCEDURE — 93225 XTRNL ECG REC<48 HRS REC: CPT

## 2022-02-28 ENCOUNTER — HOSPITAL ENCOUNTER (OUTPATIENT)
Dept: CT IMAGING | Facility: HOSPITAL | Age: 77
Discharge: HOME | End: 2022-02-28
Payer: COMMERCIAL

## 2022-02-28 ENCOUNTER — APPOINTMENT (OUTPATIENT)
Dept: LAB | Facility: HOSPITAL | Age: 77
End: 2022-02-28
Payer: COMMERCIAL

## 2022-02-28 DIAGNOSIS — I25.9 CHRONIC ISCHEMIC HEART DISEASE: ICD-10-CM

## 2022-02-28 DIAGNOSIS — R91.1 LUNG NODULE: ICD-10-CM

## 2022-02-28 LAB
ANION GAP SERPL CALCULATED.3IONS-SCNC: 6 MMOL/L (ref 4–13)
BUN SERPL-MCNC: 20 MG/DL (ref 5–25)
CALCIUM SERPL-MCNC: 9.3 MG/DL (ref 8.4–10.2)
CHLORIDE SERPL-SCNC: 101 MMOL/L (ref 96–108)
CO2 SERPL-SCNC: 34 MMOL/L (ref 21–32)
CREAT SERPL-MCNC: 1.3 MG/DL (ref 0.6–1.3)
GFR SERPL CREATININE-BSD FRML MDRD: 52 ML/MIN/1.73SQ M
GLUCOSE SERPL-MCNC: 93 MG/DL (ref 65–140)
MAGNESIUM SERPL-MCNC: 2.1 MG/DL (ref 1.9–2.7)
POTASSIUM SERPL-SCNC: 3.8 MMOL/L (ref 3.5–5.3)
SODIUM SERPL-SCNC: 141 MMOL/L (ref 135–147)

## 2022-02-28 PROCEDURE — 36415 COLL VENOUS BLD VENIPUNCTURE: CPT

## 2022-02-28 PROCEDURE — G1004 CDSM NDSC: HCPCS

## 2022-02-28 PROCEDURE — 83735 ASSAY OF MAGNESIUM: CPT | Performed by: INTERNAL MEDICINE

## 2022-02-28 PROCEDURE — 80048 BASIC METABOLIC PNL TOTAL CA: CPT

## 2022-02-28 PROCEDURE — 71250 CT THORAX DX C-: CPT

## 2022-03-02 ENCOUNTER — TELEPHONE (OUTPATIENT)
Dept: CARDIOLOGY CLINIC | Facility: CLINIC | Age: 77
End: 2022-03-02

## 2022-03-02 ENCOUNTER — OFFICE VISIT (OUTPATIENT)
Dept: FAMILY MEDICINE CLINIC | Facility: CLINIC | Age: 77
End: 2022-03-02
Payer: COMMERCIAL

## 2022-03-02 ENCOUNTER — TELEPHONE (OUTPATIENT)
Dept: OBGYN CLINIC | Facility: CLINIC | Age: 77
End: 2022-03-02

## 2022-03-02 VITALS
BODY MASS INDEX: 32.52 KG/M2 | OXYGEN SATURATION: 98 % | RESPIRATION RATE: 20 BRPM | DIASTOLIC BLOOD PRESSURE: 60 MMHG | TEMPERATURE: 98.5 F | SYSTOLIC BLOOD PRESSURE: 122 MMHG | HEART RATE: 66 BPM | WEIGHT: 214.6 LBS | HEIGHT: 68 IN

## 2022-03-02 DIAGNOSIS — M54.6 CHRONIC RIGHT-SIDED THORACIC BACK PAIN: ICD-10-CM

## 2022-03-02 DIAGNOSIS — G89.29 CHRONIC RIGHT-SIDED THORACIC BACK PAIN: ICD-10-CM

## 2022-03-02 DIAGNOSIS — F51.01 PRIMARY INSOMNIA: Primary | ICD-10-CM

## 2022-03-02 DIAGNOSIS — R14.0 ABDOMINAL BLOATING: ICD-10-CM

## 2022-03-02 PROCEDURE — 99214 OFFICE O/P EST MOD 30 MIN: CPT | Performed by: FAMILY MEDICINE

## 2022-03-02 PROCEDURE — 3725F SCREEN DEPRESSION PERFORMED: CPT | Performed by: FAMILY MEDICINE

## 2022-03-02 RX ORDER — METHOCARBAMOL 500 MG/1
500 TABLET, FILM COATED ORAL 3 TIMES DAILY PRN
Qty: 30 TABLET | Refills: 0 | Status: SHIPPED | OUTPATIENT
Start: 2022-03-02 | End: 2022-04-27

## 2022-03-02 NOTE — PROGRESS NOTES
Assessment/Plan:       Problem List Items Addressed This Visit        Other    Primary insomnia - Primary     - Declines medication, discussed sleep hygiene          Chronic right-sided thoracic back pain     - Persistent paraspinal spasms, referral to pain management for further evaluation/possible trigger point injections  - Likely secondary to chronic accessory muscle use with severe COPD         Relevant Medications    methocarbamol (ROBAXIN) 500 mg tablet    Other Relevant Orders    Ambulatory Referral to Pain Management    Abdominal bloating     - Benign abdominal exam, limited by body habitus, given limited exam and chronic symptoms recommended abdominal imaging          Relevant Orders    CT abdomen pelvis wo contrast            Subjective:      Patient ID: Massimo Romero is a 67 y o  male  HPI     Trouble sleeping, both falling and staying asleep, bedtime typically 1 AM, but sometimes doesn't fall asleep until 7 AM, wakes up noon or 1 PM  Wakes up during the night as well  Fatigued during the day  Will lay in bed for hours  Constant ache below the rib, right thoracic back pain chronic/spasm  But with movement it catches him, worse with movement  Occurring for years  He described not abdominal pain but distension and bloating chronically, feels rizvi over past 2-3 months  A lot of burping at night  Currently taking Pepcid 20 mg daily  No reflux  Normal bowel movement, no blood  The following portions of the patient's history were reviewed and updated as appropriate: allergies, current medications, past family history, past medical history, past social history, past surgical history, and problem list     Review of Systems   All other systems reviewed and are negative          Objective:      /60 (BP Location: Left arm, Patient Position: Sitting, Cuff Size: Standard)   Pulse 66   Temp 98 5 °F (36 9 °C)   Resp 20   Ht 5' 8" (1 727 m)   Wt 97 3 kg (214 lb 9 6 oz)   SpO2 98%   BMI 32 63 kg/m²          Physical Exam  Vitals reviewed  Constitutional:       General: He is not in acute distress  Appearance: Normal appearance  He is not ill-appearing, toxic-appearing or diaphoretic  HENT:      Head: Normocephalic and atraumatic  Eyes:      General:         Right eye: No discharge  Left eye: No discharge  Extraocular Movements: Extraocular movements intact  Conjunctiva/sclera: Conjunctivae normal    Cardiovascular:      Rate and Rhythm: Normal rate and regular rhythm  Heart sounds: Normal heart sounds  No murmur heard  No friction rub  No gallop  Pulmonary:      Effort: Pulmonary effort is normal  No respiratory distress  Breath sounds: No stridor  No wheezing or rhonchi  Comments: Diminished breath sounds   Abdominal:      General: Bowel sounds are normal  There is no distension  Palpations: Abdomen is soft  There is no mass  Tenderness: There is no guarding or rebound  Comments: Nonspecific tenderness   Musculoskeletal:         General: No swelling, tenderness or signs of injury  Thoracic back: Spasms present  Lumbar back: Spasms present  Skin:     General: Skin is warm  Coloration: Skin is not pale  Findings: No erythema or rash  Neurological:      Mental Status: He is alert and oriented to person, place, and time  Motor: No weakness     Psychiatric:         Mood and Affect: Mood normal          Behavior: Behavior normal              DO Kaleb Guardado [facility]

## 2022-03-02 NOTE — TELEPHONE ENCOUNTER
Called spoke with patient regarding recent laboratory results  Laboratory studies appear relatively similar to January of 2021  Will need to continue monitor renal function electrolytes to avoid significant issue however overall patient's he has a reasonably well  He did states that he just dropped office Holter monitor today as he has left it on his wife stressor for some time but I informed him that once I get the report I will read it and contact him with the results  Patient diagnosed with calling and will follow-up

## 2022-03-02 NOTE — TELEPHONE ENCOUNTER
Please call [facility] anytime after 4pm today (3/2/22) to reschedule with Dr Amilcar Miguel for next Thursday 3/10/22    NP - lt cubital/carpal tunnel  emg DN     # [phone]

## 2022-03-02 NOTE — TELEPHONE ENCOUNTER
Attempted to call patient to go over recent laboratory studies  Unfortunately I was not able to reach him but did leave a message on his phone with my name and office number to call back for better time to speak

## 2022-03-04 ENCOUNTER — TELEPHONE (OUTPATIENT)
Dept: PULMONOLOGY | Facility: CLINIC | Age: 77
End: 2022-03-04

## 2022-03-05 PROBLEM — G89.29 CHRONIC RIGHT-SIDED THORACIC BACK PAIN: Status: ACTIVE | Noted: 2022-03-05

## 2022-03-05 PROBLEM — R14.0 ABDOMINAL BLOATING: Status: ACTIVE | Noted: 2022-03-05

## 2022-03-05 PROBLEM — M54.6 CHRONIC RIGHT-SIDED THORACIC BACK PAIN: Status: ACTIVE | Noted: 2022-03-05

## 2022-03-05 NOTE — ASSESSMENT & PLAN NOTE
- Benign abdominal exam, limited by body habitus, given limited exam and chronic symptoms recommended abdominal imaging

## 2022-03-05 NOTE — ASSESSMENT & PLAN NOTE
- Persistent paraspinal spasms, referral to pain management for further evaluation/possible trigger point injections  - Likely secondary to chronic accessory muscle use with severe COPD

## 2022-03-07 ENCOUNTER — OFFICE VISIT (OUTPATIENT)
Dept: PULMONOLOGY | Facility: CLINIC | Age: 77
End: 2022-03-07
Payer: COMMERCIAL

## 2022-03-07 VITALS
HEART RATE: 72 BPM | OXYGEN SATURATION: 93 % | HEIGHT: 68 IN | SYSTOLIC BLOOD PRESSURE: 102 MMHG | BODY MASS INDEX: 32.87 KG/M2 | TEMPERATURE: 98 F | DIASTOLIC BLOOD PRESSURE: 69 MMHG | WEIGHT: 216.9 LBS

## 2022-03-07 DIAGNOSIS — G47.33 OBSTRUCTIVE SLEEP APNEA SYNDROME: ICD-10-CM

## 2022-03-07 DIAGNOSIS — J41.8 MIXED SIMPLE AND MUCOPURULENT CHRONIC BRONCHITIS (HCC): ICD-10-CM

## 2022-03-07 DIAGNOSIS — J42 CHRONIC BRONCHITIS, UNSPECIFIED CHRONIC BRONCHITIS TYPE (HCC): Primary | ICD-10-CM

## 2022-03-07 PROCEDURE — 99215 OFFICE O/P EST HI 40 MIN: CPT | Performed by: INTERNAL MEDICINE

## 2022-03-07 NOTE — PROGRESS NOTES
Assessment/Plan:    [facility]D [facility]ch[facility]) ([facility] )  I would like to see an updated version of Shlomo's PFTs however he has had episodes of syncope and is reluctant to repeat them  He will maintain on Wixela 500 which he responded 2 better than the 250 as well as Spiriva  Given his response to inhaled corticosteroids have checked a CBC and a [facility] allergen panel to assess at eligibility for injectable to help control his symptoms  He will still maintain on 3 times a day Zithromax LS contraindicated by his new cardiac meds  Reviewed his most recent CT scan which showed no concerning abnormalities  Sleep apnea  Shlomo's had trouble changing his healthcare provider over due to the fact that he has no recent sleep study  It has been probably 20 years at least since he had a sleep study  I told him most likely he would need a new 1 to establish care with a new home care company and ask him to let me know if he would like to do that  For now he is deferring  Diagnoses and all orders for this visit:    Chronic bronchitis, unspecified chronic bronchitis type ([facility] )  -     CBC and differential; Future  -     [facility] Allergy Panel, Adult; Future    Mixed simple and mucopurulent chronic bronchitis (HCC)    Obstructive sleep apnea syndrome          Subjective:      Patient ID: Moira Hancock is a 67 y o  male  Rhunette Constant is here for follow-up of his COPD  He is escalated to [address] and feels it is working better for his breathing  He is still taking Spiriva but needs to use his rescue inhaler almost every day  He has good days and bad days but for the most part is breathing well  He wears his CPAP all night every night  primary symptoms  Associated symptoms include chest pain, coughing and myalgias  Pertinent negatives include no fever, headaches or sore throat         The following portions of the patient's history were reviewed and updated as appropriate: allergies, current medications, past family history, past medical history, past social history, past surgical history and problem list     Review of Systems   Constitutional: Negative for appetite change and fever  HENT: Positive for postnasal drip, rhinorrhea and sneezing  Negative for ear pain and sore throat  Respiratory: Positive for cough and shortness of breath  Cardiovascular: Positive for chest pain  Musculoskeletal: Positive for myalgias  Neurological: Negative for headaches  Objective:      /69   Pulse 72   Temp 98 °F (36 7 °C) (Tympanic)   Ht 5' 8" (1 727 m)   Wt 98 4 kg (216 lb 14 4 oz)   SpO2 93%   BMI 32 98 kg/m²          Physical Exam  Constitutional:       Appearance: He is well-developed  HENT:      Head: Normocephalic  Eyes:      Pupils: Pupils are equal, round, and reactive to light  Cardiovascular:      Rate and Rhythm: Normal rate  Pulmonary:      Effort: Pulmonary effort is normal  No respiratory distress  Breath sounds: Wheezing and rhonchi present  No rales  Abdominal:      Palpations: Abdomen is soft  Musculoskeletal:         General: Normal range of motion  Cervical back: Neck supple  Skin:     General: Skin is warm and dry  Neurological:      Mental Status: He is alert and oriented to person, place, and time           Answers for HPI/ROS submitted by the patient on 3/1/2022  Do you have chest tightness?: Yes  Do you have difficulty breathing?: Yes  Chronicity: recurrent  When did you first notice your symptoms?: more than 1 year ago  How often do your symptoms occur?: daily  Since you first noticed this problem, how has it changed?: gradually worsening  Do you have shortness of breath that occurs with effort or exertion?: Yes  Do you have ear congestion?: No  Do you have heartburn?: No  Do you have fatigue?: Yes  Do you have nasal congestion?: No  Do you have shortness of breath when lying flat?: Yes  Do you have shortness of breath when you wake up?: No  Do you have sweats?: No  Have you experienced weight loss?: No  Which of the following makes your symptoms worse?: climbing stairs, exercise, exposure to smoke, lying down, strenuous activity  Which of the following makes your symptoms better?: change in position, OTC cough suppressant, rest, steroid inhaler

## 2022-03-07 NOTE — ASSESSMENT & PLAN NOTE
I would like to see an updated version of Shlomo's PFTs however he has had episodes of syncope and is reluctant to repeat them  He will maintain on Wixela 500 which he responded 2 better than the 250 as well as Spiriva  Given his response to inhaled corticosteroids have checked a CBC and a [place] allergen panel to assess at eligibility for injectable to help control his symptoms  He will still maintain on 3 times a day Zithromax LS contraindicated by his new cardiac meds  Reviewed his most recent CT scan which showed no concerning abnormalities

## 2022-03-07 NOTE — ASSESSMENT & PLAN NOTE
Shlomo's had trouble changing his healthcare provider over due to the fact that he has no recent sleep study  It has been probably 20 years at least since he had a sleep study  I told him most likely he would need a new 1 to establish care with a new home care company and ask him to let me know if he would like to do that  For now he is deferring

## 2022-03-09 ENCOUNTER — HOSPITAL ENCOUNTER (OUTPATIENT)
Dept: CT IMAGING | Facility: HOSPITAL | Age: 77
Discharge: HOME | End: 2022-03-09
Payer: COMMERCIAL

## 2022-03-09 DIAGNOSIS — R14.0 ABDOMINAL BLOATING: ICD-10-CM

## 2022-03-09 PROCEDURE — 74176 CT ABD & PELVIS W/O CONTRAST: CPT

## 2022-03-09 PROCEDURE — G1004 CDSM NDSC: HCPCS

## 2022-03-10 ENCOUNTER — OFFICE VISIT (OUTPATIENT)
Dept: OBGYN CLINIC | Facility: CLINIC | Age: 77
End: 2022-03-10
Payer: COMMERCIAL

## 2022-03-10 VITALS
WEIGHT: 213 LBS | HEART RATE: 74 BPM | BODY MASS INDEX: 32.28 KG/M2 | HEIGHT: 68 IN | SYSTOLIC BLOOD PRESSURE: 122 MMHG | RESPIRATION RATE: 17 BRPM | DIASTOLIC BLOOD PRESSURE: 79 MMHG

## 2022-03-10 DIAGNOSIS — G56.22 CUBITAL TUNNEL SYNDROME ON LEFT: ICD-10-CM

## 2022-03-10 DIAGNOSIS — Z01.812 ENCOUNTER FOR PRE-OPERATIVE LABORATORY TESTING: ICD-10-CM

## 2022-03-10 DIAGNOSIS — G56.22 GUYON SYNDROME, LEFT: Primary | ICD-10-CM

## 2022-03-10 DIAGNOSIS — G56.02 CARPAL TUNNEL SYNDROME ON LEFT: ICD-10-CM

## 2022-03-10 PROCEDURE — 99204 OFFICE O/P NEW MOD 45 MIN: CPT | Performed by: SURGERY

## 2022-03-10 PROCEDURE — 93227 XTRNL ECG REC<48 HR R&I: CPT | Performed by: INTERNAL MEDICINE

## 2022-03-10 PROCEDURE — 3074F SYST BP LT 130 MM HG: CPT | Performed by: SURGERY

## 2022-03-10 PROCEDURE — 3078F DIAST BP <80 MM HG: CPT | Performed by: SURGERY

## 2022-03-10 RX ORDER — CHLORHEXIDINE GLUCONATE 0.12 MG/ML
15 RINSE ORAL ONCE
Status: CANCELLED | OUTPATIENT
Start: 2022-03-10 | End: 2022-03-10

## 2022-03-10 NOTE — H&P
Vy OLIVEIRA  Attending, Orthopaedic Surgery  Hand, Wrist, and Elbow Surgery  [facility]      ORTHOPAEDIC HAND, WRIST, AND ELBOW OFFICE  VISIT       ASSESSMENT/PLAN:      65-year-old male with left carpal tunnel syndrome, left cubital tunnel syndrome, and left Guyon's canal syndrome  Reviewed EMG with the patient and discussed the above diagnoses along with treatment options  Since he does have persistent numbness and loss of hot cold sensation in the small and ring fingers I do encourage him to consider surgical release at least of the ulnar nerve  We discussed the surgical plan and recovery time  Risks benefits alternatives were discussed and patient was agreeable to proceeding with surgery likely at the end of the summer  Informed consent was signed today  He does have multiple comorbidities and will require preoperative testing  We discussed that small and ring finger will likely never have normal sensation but I do hope he regains at least a little bit  We will see him 10-14 days after surgery for postoperative evaluation and suture removal     The patient verbalized understanding of exam findings and treatment plan  We engaged in the shared decision-making process and treatment options were discussed at length with the patient  Surgical and conservative management discussed today along with risks and benefits  Diagnoses and all orders for this visit:    Guyon syndrome, left  -     Case request operating room: RELEASE CUBITAL TUNNEL, RELEASE CARPAL TUNNEL, [facility]; Standing  -     Basic metabolic panel; Future  -     EKG 12 lead;  Future  -     Case request operating room: RELEASE CUBITAL TUNNEL, RELEASE CARPAL TUNNEL, GUYON'S CANAL RELEASE    Cubital tunnel syndrome on left  -     Ambulatory Referral to Hand Surgery  -     Case request operating room: RELEASE CUBITAL TUNNEL, RELEASE CARPAL TUNNEL, GUYON'S CANAL RELEASE; Standing  -     Basic metabolic panel; Future  -     EKG 12 lead; Future  -     Case request operating room: RELEASE CUBITAL TUNNEL, RELEASE CARPAL TUNNEL, GUYON'S CANAL RELEASE    Carpal tunnel syndrome on left  -     Ambulatory Referral to Hand Surgery  -     Case request operating room: RELEASE CUBITAL TUNNEL, RELEASE CARPAL TUNNEL, GUYON'S CANAL RELEASE; Standing  -     Basic metabolic panel; Future  -     EKG 12 lead; Future  -     Case request operating room: RELEASE CUBITAL TUNNEL, RELEASE CARPAL TUNNEL, [facility] RELEASE    Encounter for pre-operative laboratory testing  -     Basic metabolic panel; Future  -     EKG 12 lead; Future        Follow Up:  Return for After Surgery  To Do Next Visit:  Re-evaluation of current issue      General Discussions:  Carpal Tunnel Syndrome: The anatomy and physiology of carpal tunnel syndrome was discussed with the patient today  Increase pressure localized under the transverse carpal ligament can cause pain, numbness, tingling, or dysesthesias within the median nerve distribution as well as feelings of fatigue, clumsiness, or awkwardness  These symptoms typically occur at night and worse in the morning upon waking  Eventually, untreated carpal tunnel syndrome can result in weakness and permanent loss of muscle within the thenar compartment of the hand  Treatment options were discussed with the patient  Conservative treatment includes nocturnal resting splints to keep the nerve in a neutral position, ergonomic changes within the work or home environment, activity modification, and tendon gliding exercises  Vitamin B6 one tablet daily over the counter may helpful to reduce symptoms  Steroid injections within the carpal canal can help a majority of patients, however this is often self-limited in a majority of patients    Surgical intervention to divide the transverse carpal ligament typically results in a long-lasting relief of the patient's complaints, with the recurrence rate of less than 1%                                                                                                                                                                                  Cubital Tunnel Syndrome: The anatomy and physiology of cubital tunnel syndrome were discussed with the patient today in the office  Typically, increased elbow flexion activities decrease blood flow within the intraneural spaces, resulting in a feeling of numbness, tingling, weakness, or clumsiness within the hand and fingers  Occasionally, anatomic structures such as medial elbow osteophytes, the medial head of the triceps, were subluxing ulnar nerve may result in increased pressure or aggravation at the cubital tunnel  Typical signs and symptoms usually include numbness and tingling within the ring and small finger, weakness with , and weakness with pinch  Conservative treatment and includes nocturnal bracing to keep the elbow in a semi-extended position, activity modification, therapy, and avoiding excessive elbow flexion activities  Vitamin B6 one tablet daily over the counter may helpful to reduce symptoms  A majority of patients typically respond to conservative treatment over a period of approximately 3-6 months  EMG/NCV testing of the ulnar nerve at the elbow is not as reliable as carpal tunnel syndrome  Surgical intervention in the form of in situ release of the ulnar nerve at the elbow or ulnar nerve transposition may be required in up to 20% of patients  Operative Discussions:  Cubital Tunnel Release: The anatomy and physiology of cubital tunnel syndrome were discussed with the patient today in the office  Typically, increased elbow flexion activities decrease blood flow within the intraneural spaces, resulting in a feeling of numbness, tingling, weakness, or clumsiness within the hand and fingers    Occasionally, anatomic structures such as medial elbow osteophytes, the medial head of the triceps, were subluxing ulnar nerve may result in increased pressure or aggravation at the cubital tunnel  Typical signs and symptoms usually include numbness and tingling within the ring and small finger, weakness with , and weakness with pinch  Conservative treatment and includes nocturnal bracing to keep the elbow in a semi-extended position, activity modification, therapy, and avoiding excessive elbow flexion activities  A majority of patients typically respond to conservative treatment over a period of approximately 3-6 months  Vitamin B6 one tablet daily over the counter may helpful to reduce symptoms  EMG/NCV testing of the ulnar nerve at the elbow is not as reliable as carpal tunnel syndrome  Surgical intervention in the form of in situ release of the ulnar nerve at the elbow or ulnar nerve transposition may be required in up to 20% of patients  The patient has elected to undergo cubital tunnel release  The possibility of converting to a subcutaneous or submuscular ulnar nerve transposition depending on the nerve stability was discussed with the patient  Typically, in the postoperative period, light activities are allowed immediately, driving is allowed when narcotic medications have stopped, and the incision may get wet after 5 days  Heavy activities will be allowed after follow up appointment in 1-2 weeks  While the pain within the ring and small finger of the hands generally improves rapidly, the numbness and tingling, as well as the strength, will slowly improve over a period of weeks to months  Total recovery can take up to 18 months from the time of surgery  Numbness and tingling near the incision, or near the medial aspect of the forearm was discussed with the patient  The patient has an understanding of the above mentioned discussion   The risks and benefits of the procedure were explained to the patient, which include, but are not limited to: Bleeding, infection, recurrence, pain, scar, damage to tendons, damage to nerves, and damage to blood vessels, failure to give desired results and complications related to anesthesia  These risks, along with alternative conservative treatment options, and postoperative protocols were voiced back and understood by the patient  All questions were answered to the patient's satisfaction  The patient agrees to comply with a standard postoperative protocol, and is willing to proceed  Education was provided via written and auditory forms  There were no barriers to learning  Written handouts regarding wound care, incision and scar care, and general preoperative information was provided to the patient  Prior to surgery, the patient may be requested to stop all anti-inflammatory medications  Prophylactic aspirin, Plavix, and Coumadin may be allowed to be continued  Medications including vitamin E , ginkgo, and fish oil are requested to be stopped approximately one week prior to surgery  Hypertensive medications and beta blockers, if taken, should be continued  Vitamin B6 one tablet daily over the counter may helpful to reduce symptoms  and Open Carpal Tunnel Release: The anatomy and physiology of carpal tunnel syndrome was discussed with the patient today  Increase pressure localized under the transverse carpal ligament can cause pain, numbness, tingling, or dysesthesias within the median nerve distribution as well as feelings of fatigue, clumsiness, or awkwardness  These symptoms typically occur at night and worse in the morning upon waking  Eventually, untreated carpal tunnel syndrome can result in weakness and permanent loss of muscle within the thenar compartment of the hand  Treatment options were discussed with the patient  Conservative treatment includes nocturnal resting splints to keep the nerve in a neutral position, ergonomic changes within the work or home environment, activity modification, and tendon gliding exercises    Vitamin B6 one tablet daily over the counter may helpful to reduce symptoms  Steroid injections within the carpal canal can help a majority of patients, however this is often self-limited in a majority of patients  Surgical intervention to divide the transverse carpal ligament typically results in a long-lasting relief of the patient's complaints, with the recurrence rate of less than 1%  The patient has elected to undergo an open carpal tunnel release  The palmar incision technique was discussed in the office with the patient today  In the postoperative period, light activities are allowed immediately, driving is allowed when narcotic medication has stopped, and the bandages may be removed and incision may get wet after 2 days  Heavy activities (lifting more than approximately 10 pounds) will be allowed after follow up appointment in 1-2 weeks  While night symptoms (waking from sleep, pain, and discomfort in the hands) generally improves rapidly, the numbness and tingling as well as the strength will slowly improve over weeks to months depending on the chronicity and severity of the carpal tunnel syndrome  Pillar pain and scar discomfort were discussed with the patient which are self-limiting conditions  The risks of bleeding and infection from the surgery are less than 1%  Risk of recurrence is approximately 0 5%  The risks of nerve injury or nerve damage or damage to the blood vessels is approximately 1 in 1200  The patient has an understanding of the above mentioned discussion  The risks and benefits of the procedure were explained to the patient, which include, but are not limited to: Bleeding, infection, recurrence, pain, scar, damage to tendons, damage to nerves, and damage to blood vessels, failure to give desired results and complications related to anesthesia  These risks, along with alternative conservative treatment options, and postoperative protocols were voiced back and understood by the patient    All questions were answered to the patient's satisfaction  The patient agrees to comply with a standard postoperative protocol, and is willing to proceed  Education was provided via written and auditory forms  There were no barriers to learning  Written handouts regarding wound care, incision and scar care, and general preoperative information was provided to the patient  Prior to surgery, the patient may be requested to stop all anti-inflammatory medications  Prophylactic aspirin, Plavix, and Coumadin may be allowed to be continued  Medications including vitamin E , ginkgo, and fish oil are requested to be stopped approximately one week prior to surgery  Hypertensive medications and beta blockers, if taken, should be continued  ____________________________________________________________________________________________________________________________________________      CHIEF COMPLAINT:  Chief Complaint   Patient presents with    Left Wrist - Pain, Tingling, Numbness       SUBJECTIVE:  Jemma Napoles is a 67y o  year old RHD male seen in consultation requested by Dr Joni White who presents for evaluation of the left upper extremity  He notes persistent numbness and insensitivity to temperature of the small finger which has been progressing for months  He denies significant symptoms in the radial 3 digits  He does have history of wrist fractures as a child but did not discuss any surgeries on the wrist   He has tried wrist bracing since the EMG was done and has not noticed a significant difference        Pain/symptom timing:  Worse during the day when active  Pain/symptom context:  Worse with activites and work  Pain/symptom modifying factors:  Rest makes better, activities make worse  Pain/symptom associated signs/symptoms: none    Prior treatment   · NSAIDsNo   · Injections No   · Bracing/Orthotics Yes    Physical Therapy No     I have personally reviewed all the relevant PMH, PSH, SH, FH, Medications and allergies      PAST MEDICAL HISTORY:  Past Medical History:   Diagnosis Date    Bilateral carotid artery stenosis     Chronic diastolic heart failure (HCC)     Chronic ischemic heart disease     [facility]     Coronary artery disease     hx stents, MI, PCI    Hearing loss     Hyperlipidemia     Hypertension     [facility] infarction) [facility] [address] [facility]     S/P carotid endarterectomy     Sleep apnea     Sleep apnea, obstructive     Stented coronary artery        PAST SURGICAL HISTORY:  Past Surgical History:   Procedure Laterality Date    CARDIAC CATHETERIZATION  2021    left main with no significant disease, proximal LAD with 10% stenosis at the site of prior stent, left circumflex artery with minimal luminal irregularities mid RCA with 50% stenosis at site of prior stent and PL segment with distal disease supplied by collaterals from the distal circumflex with no significant CAD requiring revascularization at that time      COLONOSCOPY      CORONARY ANGIOPLASTY WITH STENT PLACEMENT      RCA    CORONARY ANGIOPLASTY WITH STENT PLACEMENT      RCA    CORONARY ANGIOPLASTY WITH STENT PLACEMENT      LAD    EYE SURGERY      SKIN CANCER EXCISION      arm       FAMILY HISTORY:  Family History   Problem Relation Age of Onset    Heart attack Mother     Dementia Mother        SOCIAL HISTORY:  Social History     Tobacco Use    Smoking status: Former Smoker     Quit date:      Years since quittin 2    Smokeless tobacco: Never Used   Vaping Use    Vaping Use: Never used   Substance Use Topics    Alcohol use: Not Currently    Drug use: Never       MEDICATIONS:    Current Outpatient Medications:     albuterol (5 mg/mL) 0 5 % nebulizer solution, 2 5 mg every 6 (six) hours as needed , Disp: , Rfl:     Ascorbic Acid (vitamin C) 1000 MG tablet, Take 1,000 mg by mouth daily, Disp: , Rfl:     aspirin 81 mg chewable tablet, CHEW ONE TABLET BY MOUTH EVERY DAY, Disp: , Rfl:     azithromycin (ZITHROMAX) 250 mg tablet, Take 1 every Monday, Wednesday and Friday , Disp: 45 tablet, Rfl: 3    Blood Pressure Monitor KIT, Use daily, Disp: 1 kit, Rfl: 0    famotidine (PEPCID) 20 mg tablet, Take 1 tablet (20 mg total) by mouth daily, Disp: 100 tablet, Rfl: 3    fluticasone (FLONASE) 50 mcg/act nasal spray, into each nostril as needed , Disp: , Rfl:     fluticasone-salmeterol (Wixela Inhub) 500-50 mcg/dose inhaler, Inhale 1 puff in the morning Rinse mouth after use , Disp: , Rfl:     furosemide (LASIX) 40 mg tablet, Take 1 tablet (40 mg total) by mouth daily, Disp: 100 tablet, Rfl: 3    methocarbamol (ROBAXIN) 500 mg tablet, Take 1 tablet (500 mg total) by mouth 3 (three) times a day as needed for muscle spasms, Disp: 30 tablet, Rfl: 0    metoprolol succinate (TOPROL-XL) 25 mg 24 hr tablet, Take 2 tablets (50 mg total) by mouth 2 (two) times a day, Disp: 100 tablet, Rfl: 3    Multiple Vitamin (multivitamin) tablet, Take 1 tablet by mouth daily, Disp: , Rfl:     nitroglycerin (NITROSTAT) 0 4 mg SL tablet, DISSOLVE ONE TABLET UNDER THE TONGUE  PRN, Disp: , Rfl:     potassium chloride (Klor-Con) 10 mEq tablet, Take 2 tablets (20 mEq total) by mouth daily, Disp: 200 tablet, Rfl: 3    rosuvastatin (CRESTOR) 40 MG tablet, Take 1 tablet (40 mg total) by mouth daily, Disp: 100 tablet, Rfl: 3    Tiotropium Bromide Monohydrate (SPIRIVA RESPIMAT IN), Inhale, Disp: , Rfl:     ALLERGIES:  Allergies   Allergen Reactions    Lisinopril Swelling and Cough    Tetanus Antitoxin Anaphylaxis    Tetanus Toxoid Anaphylaxis and Swelling           REVIEW OF SYSTEMS:  Review of Systems   Constitutional: Negative for chills, fatigue, fever and unexpected weight change  HENT: Negative for ear pain, hearing loss, nosebleeds and sore throat  Eyes: Negative for pain, redness and visual disturbance  Respiratory: Negative for cough, shortness of breath and wheezing  Cardiovascular: Negative for chest pain, palpitations and leg swelling  Gastrointestinal: Negative for abdominal pain, nausea and vomiting  Endocrine: Negative for polydipsia and polyuria  Genitourinary: Negative for difficulty urinating, dysuria and hematuria  Musculoskeletal: Negative for arthralgias, back pain, joint swelling and myalgias  Skin: Negative for color change, rash and wound  Neurological: Positive for numbness  Negative for dizziness, seizures, syncope and headaches  Psychiatric/Behavioral: Negative for decreased concentration, dysphoric mood and suicidal ideas  The patient is not nervous/anxious  All other systems reviewed and are negative  VITALS:  Vitals:    03/10/22 1341   BP: 122/79   Pulse: 74   Resp: 17       LABS:  HgA1c:   Lab Results   Component Value Date    HGBA1C 5 7 (H) 07/20/2021     BMP:   Lab Results   Component Value Date    CALCIUM 9 3 02/28/2022    K 3 8 02/28/2022    CO2 34 (H) 02/28/2022     02/28/2022    BUN 20 02/28/2022    CREATININE 1 30 02/28/2022       _____________________________________________________  PHYSICAL EXAMINATION:  General: well developed and well nourished, alert, oriented times 3 and appears comfortable  Psychiatric: Normal  HEENT: Normocephalic, Atraumatic Trachea Midline, No torticollis  Pulmonary: No audible wheezing or respiratory distress   Abdomen/GI: Non tender, non distended   Cardiovascular: No pitting edema, 2+ radial pulse   Skin: No masses, erythema, lacerations, fluctation, ulcerations  Neurovascular: Sensation intact to the Median Nerve, Sensation Intact to the Radial Nerve, Decreased Sensation to  the Ulnar Nerve, Motor Intact to the Median, Ulnar, Radial Nerve and Pulses Intact  Musculoskeletal: Normal, except as noted in detailed exam and in HPI  MUSCULOSKELETAL EXAMINATION:  Left Carpal Tunnel Exam:    Negative thenar atrophy  Negative phalen's test  Negative carpal tunnel compression   Negative tinels over median nerve at the wrist   Opposition strength 5/5  Abduction strength 5/5  Left Ulnar Nerve Exam:    Negative intrinsic atrophy  Negative  deformity at the elbow  Full range of motion with flexion and extension of the elbow  Positive ulnar nerve compression test at the elbow  Negative tinels over the ulnar nerve at the elbow  Positive cross finger test in the index and long fingers  FDP strength is 5/5 to the ring finger  FDP strength is 5/5 to the small finger  Intrinsic strength 5/5      ___________________________________________________  STUDIES REVIEWED:  I have personally reviewed EMG of left upper extremity which demonstrates localized neuropathic process of the ulnar nerve at the elbow (cubital tunnel syndrome) as well as entrapment of the ulnar nerve at the wrist at Guyon's canal   Patient also has entrapment of the median nerve at the wrist consistent with moderate to severe carpal tunnel syndrome            PROCEDURES PERFORMED:  Procedures  No Procedures performed today    _____________________________________________________      Scribe Attestation    I,:  Boy Diego PA-C am acting as a scribe while in the presence of the attending physician :       I,:  Quita Dalal MD personally performed the services described in this documentation    as scribed in my presence :

## 2022-03-10 NOTE — PROGRESS NOTES
Bhavna OLIVEIRA  Attending, Orthopaedic Surgery  Hand, Wrist, and Elbow Surgery  [facility]      ORTHOPAEDIC HAND, WRIST, AND ELBOW OFFICE  VISIT       ASSESSMENT/PLAN:      74-year-old male with left carpal tunnel syndrome, left cubital tunnel syndrome, and left Guyon's canal syndrome  Reviewed EMG with the patient and discussed the above diagnoses along with treatment options  Since he does have persistent numbness and loss of hot cold sensation in the small and ring fingers I do encourage him to consider surgical release at least of the ulnar nerve  We discussed the surgical plan and recovery time  Risks benefits alternatives were discussed and patient was agreeable to proceeding with surgery likely at the end of the summer  Informed consent was signed today  He does have multiple comorbidities and will require preoperative testing  We discussed that small and ring finger will likely never have normal sensation but I do hope he regains at least a little bit  We will see him 10-14 days after surgery for postoperative evaluation and suture removal     The patient verbalized understanding of exam findings and treatment plan  We engaged in the shared decision-making process and treatment options were discussed at length with the patient  Surgical and conservative management discussed today along with risks and benefits  Diagnoses and all orders for this visit:    Guyon syndrome, left  -     Case request operating room: RELEASE CUBITAL TUNNEL, RELEASE CARPAL TUNNEL, [facility]; Standing  -     Basic metabolic panel; Future  -     EKG 12 lead;  Future  -     Case request operating room: RELEASE CUBITAL TUNNEL, RELEASE CARPAL TUNNEL, GUYON'S CANAL RELEASE    Cubital tunnel syndrome on left  -     Ambulatory Referral to Hand Surgery  -     Case request operating room: RELEASE CUBITAL TUNNEL, RELEASE CARPAL TUNNEL, GUYON'S CANAL RELEASE; Standing  -     Basic metabolic panel; Future  -     EKG 12 lead; Future  -     Case request operating room: RELEASE CUBITAL TUNNEL, RELEASE CARPAL TUNNEL, GUYON'S CANAL RELEASE    Carpal tunnel syndrome on left  -     Ambulatory Referral to Hand Surgery  -     Case request operating room: RELEASE CUBITAL TUNNEL, RELEASE CARPAL TUNNEL, GUYON'S CANAL RELEASE; Standing  -     Basic metabolic panel; Future  -     EKG 12 lead; Future  -     Case request operating room: RELEASE CUBITAL TUNNEL, RELEASE CARPAL TUNNEL, [facility] RELEASE    Encounter for pre-operative laboratory testing  -     Basic metabolic panel; Future  -     EKG 12 lead; Future        Follow Up:  Return for After Surgery  To Do Next Visit:  Re-evaluation of current issue      General Discussions:  Carpal Tunnel Syndrome: The anatomy and physiology of carpal tunnel syndrome was discussed with the patient today  Increase pressure localized under the transverse carpal ligament can cause pain, numbness, tingling, or dysesthesias within the median nerve distribution as well as feelings of fatigue, clumsiness, or awkwardness  These symptoms typically occur at night and worse in the morning upon waking  Eventually, untreated carpal tunnel syndrome can result in weakness and permanent loss of muscle within the thenar compartment of the hand  Treatment options were discussed with the patient  Conservative treatment includes nocturnal resting splints to keep the nerve in a neutral position, ergonomic changes within the work or home environment, activity modification, and tendon gliding exercises  Vitamin B6 one tablet daily over the counter may helpful to reduce symptoms  Steroid injections within the carpal canal can help a majority of patients, however this is often self-limited in a majority of patients    Surgical intervention to divide the transverse carpal ligament typically results in a long-lasting relief of the patient's complaints, with the recurrence rate of less than 1%                                                                                                                                                                                  Cubital Tunnel Syndrome: The anatomy and physiology of cubital tunnel syndrome were discussed with the patient today in the office  Typically, increased elbow flexion activities decrease blood flow within the intraneural spaces, resulting in a feeling of numbness, tingling, weakness, or clumsiness within the hand and fingers  Occasionally, anatomic structures such as medial elbow osteophytes, the medial head of the triceps, were subluxing ulnar nerve may result in increased pressure or aggravation at the cubital tunnel  Typical signs and symptoms usually include numbness and tingling within the ring and small finger, weakness with , and weakness with pinch  Conservative treatment and includes nocturnal bracing to keep the elbow in a semi-extended position, activity modification, therapy, and avoiding excessive elbow flexion activities  Vitamin B6 one tablet daily over the counter may helpful to reduce symptoms  A majority of patients typically respond to conservative treatment over a period of approximately 3-6 months  EMG/NCV testing of the ulnar nerve at the elbow is not as reliable as carpal tunnel syndrome  Surgical intervention in the form of in situ release of the ulnar nerve at the elbow or ulnar nerve transposition may be required in up to 20% of patients  Operative Discussions:  Cubital Tunnel Release: The anatomy and physiology of cubital tunnel syndrome were discussed with the patient today in the office  Typically, increased elbow flexion activities decrease blood flow within the intraneural spaces, resulting in a feeling of numbness, tingling, weakness, or clumsiness within the hand and fingers    Occasionally, anatomic structures such as medial elbow osteophytes, the medial head of the triceps, were subluxing ulnar nerve may result in increased pressure or aggravation at the cubital tunnel  Typical signs and symptoms usually include numbness and tingling within the ring and small finger, weakness with , and weakness with pinch  Conservative treatment and includes nocturnal bracing to keep the elbow in a semi-extended position, activity modification, therapy, and avoiding excessive elbow flexion activities  A majority of patients typically respond to conservative treatment over a period of approximately 3-6 months  Vitamin B6 one tablet daily over the counter may helpful to reduce symptoms  EMG/NCV testing of the ulnar nerve at the elbow is not as reliable as carpal tunnel syndrome  Surgical intervention in the form of in situ release of the ulnar nerve at the elbow or ulnar nerve transposition may be required in up to 20% of patients  The patient has elected to undergo cubital tunnel release  The possibility of converting to a subcutaneous or submuscular ulnar nerve transposition depending on the nerve stability was discussed with the patient  Typically, in the postoperative period, light activities are allowed immediately, driving is allowed when narcotic medications have stopped, and the incision may get wet after 5 days  Heavy activities will be allowed after follow up appointment in 1-2 weeks  While the pain within the ring and small finger of the hands generally improves rapidly, the numbness and tingling, as well as the strength, will slowly improve over a period of weeks to months  Total recovery can take up to 18 months from the time of surgery  Numbness and tingling near the incision, or near the medial aspect of the forearm was discussed with the patient  The patient has an understanding of the above mentioned discussion   The risks and benefits of the procedure were explained to the patient, which include, but are not limited to: Bleeding, infection, recurrence, pain, scar, damage to tendons, damage to nerves, and damage to blood vessels, failure to give desired results and complications related to anesthesia  These risks, along with alternative conservative treatment options, and postoperative protocols were voiced back and understood by the patient  All questions were answered to the patient's satisfaction  The patient agrees to comply with a standard postoperative protocol, and is willing to proceed  Education was provided via written and auditory forms  There were no barriers to learning  Written handouts regarding wound care, incision and scar care, and general preoperative information was provided to the patient  Prior to surgery, the patient may be requested to stop all anti-inflammatory medications  Prophylactic aspirin, Plavix, and Coumadin may be allowed to be continued  Medications including vitamin E , ginkgo, and fish oil are requested to be stopped approximately one week prior to surgery  Hypertensive medications and beta blockers, if taken, should be continued  Vitamin B6 one tablet daily over the counter may helpful to reduce symptoms  and Open Carpal Tunnel Release: The anatomy and physiology of carpal tunnel syndrome was discussed with the patient today  Increase pressure localized under the transverse carpal ligament can cause pain, numbness, tingling, or dysesthesias within the median nerve distribution as well as feelings of fatigue, clumsiness, or awkwardness  These symptoms typically occur at night and worse in the morning upon waking  Eventually, untreated carpal tunnel syndrome can result in weakness and permanent loss of muscle within the thenar compartment of the hand  Treatment options were discussed with the patient  Conservative treatment includes nocturnal resting splints to keep the nerve in a neutral position, ergonomic changes within the work or home environment, activity modification, and tendon gliding exercises    Vitamin B6 one tablet daily over the counter may helpful to reduce symptoms  Steroid injections within the carpal canal can help a majority of patients, however this is often self-limited in a majority of patients  Surgical intervention to divide the transverse carpal ligament typically results in a long-lasting relief of the patient's complaints, with the recurrence rate of less than 1%  The patient has elected to undergo an open carpal tunnel release  The palmar incision technique was discussed in the office with the patient today  In the postoperative period, light activities are allowed immediately, driving is allowed when narcotic medication has stopped, and the bandages may be removed and incision may get wet after 2 days  Heavy activities (lifting more than approximately 10 pounds) will be allowed after follow up appointment in 1-2 weeks  While night symptoms (waking from sleep, pain, and discomfort in the hands) generally improves rapidly, the numbness and tingling as well as the strength will slowly improve over weeks to months depending on the chronicity and severity of the carpal tunnel syndrome  Pillar pain and scar discomfort were discussed with the patient which are self-limiting conditions  The risks of bleeding and infection from the surgery are less than 1%  Risk of recurrence is approximately 0 5%  The risks of nerve injury or nerve damage or damage to the blood vessels is approximately 1 in 1200  The patient has an understanding of the above mentioned discussion  The risks and benefits of the procedure were explained to the patient, which include, but are not limited to: Bleeding, infection, recurrence, pain, scar, damage to tendons, damage to nerves, and damage to blood vessels, failure to give desired results and complications related to anesthesia  These risks, along with alternative conservative treatment options, and postoperative protocols were voiced back and understood by the patient    All questions were answered to the patient's satisfaction  The patient agrees to comply with a standard postoperative protocol, and is willing to proceed  Education was provided via written and auditory forms  There were no barriers to learning  Written handouts regarding wound care, incision and scar care, and general preoperative information was provided to the patient  Prior to surgery, the patient may be requested to stop all anti-inflammatory medications  Prophylactic aspirin, Plavix, and Coumadin may be allowed to be continued  Medications including vitamin E , ginkgo, and fish oil are requested to be stopped approximately one week prior to surgery  Hypertensive medications and beta blockers, if taken, should be continued  ____________________________________________________________________________________________________________________________________________      CHIEF COMPLAINT:  Chief Complaint   Patient presents with    Left Wrist - Pain, Tingling, Numbness       SUBJECTIVE:  Ralph Bautista is a 67y o  year old RHD male seen in consultation requested by Dr Pedro Ingram who presents for evaluation of the left upper extremity  He notes persistent numbness and insensitivity to temperature of the small finger which has been progressing for months  He denies significant symptoms in the radial 3 digits  He does have history of wrist fractures as a child but did not discuss any surgeries on the wrist   He has tried wrist bracing since the EMG was done and has not noticed a significant difference        Pain/symptom timing:  Worse during the day when active  Pain/symptom context:  Worse with activites and work  Pain/symptom modifying factors:  Rest makes better, activities make worse  Pain/symptom associated signs/symptoms: none    Prior treatment   · NSAIDsNo   · Injections No   · Bracing/Orthotics Yes    Physical Therapy No     I have personally reviewed all the relevant PMH, PSH, SH, FH, Medications and allergies      PAST MEDICAL HISTORY:  Past Medical History:   Diagnosis Date    Bilateral carotid artery stenosis     Chronic diastolic heart failure (HCC)     Chronic ischemic heart disease     [facility]     Coronary artery disease     hx stents, MI, PCI    Hearing loss     Hyperlipidemia     Hypertension     [facility]ocardial infarction) [facility] [address] [facility]     S/P carotid endarterectomy     Sleep apnea     Sleep apnea, obstructive     Stented coronary artery        PAST SURGICAL HISTORY:  Past Surgical History:   Procedure Laterality Date    CARDIAC CATHETERIZATION  2021    left main with no significant disease, proximal LAD with 10% stenosis at the site of prior stent, left circumflex artery with minimal luminal irregularities mid RCA with 50% stenosis at site of prior stent and PL segment with distal disease supplied by collaterals from the distal circumflex with no significant CAD requiring revascularization at that time      COLONOSCOPY      CORONARY ANGIOPLASTY WITH STENT PLACEMENT      RCA    CORONARY ANGIOPLASTY WITH STENT PLACEMENT      RCA    CORONARY ANGIOPLASTY WITH STENT PLACEMENT      LAD    EYE SURGERY      SKIN CANCER EXCISION      arm       FAMILY HISTORY:  Family History   Problem Relation Age of Onset    Heart attack Mother     Dementia Mother        SOCIAL HISTORY:  Social History     Tobacco Use    Smoking status: Former Smoker     Quit date:      Years since quittin 2    Smokeless tobacco: Never Used   Vaping Use    Vaping Use: Never used   Substance Use Topics    Alcohol use: Not Currently    Drug use: Never       MEDICATIONS:    Current Outpatient Medications:     albuterol (5 mg/mL) 0 5 % nebulizer solution, 2 5 mg every 6 (six) hours as needed , Disp: , Rfl:     Ascorbic Acid (vitamin C) 1000 MG tablet, Take 1,000 mg by mouth daily, Disp: , Rfl:     aspirin 81 mg chewable tablet, CHEW ONE TABLET BY MOUTH EVERY DAY, Disp: , Rfl:     azithromycin (ZITHROMAX) 250 mg tablet, Take 1 every Monday, Wednesday and Friday , Disp: 45 tablet, Rfl: 3    Blood Pressure Monitor KIT, Use daily, Disp: 1 kit, Rfl: 0    famotidine (PEPCID) 20 mg tablet, Take 1 tablet (20 mg total) by mouth daily, Disp: 100 tablet, Rfl: 3    fluticasone (FLONASE) 50 mcg/act nasal spray, into each nostril as needed , Disp: , Rfl:     fluticasone-salmeterol (Wixela Inhub) 500-50 mcg/dose inhaler, Inhale 1 puff in the morning Rinse mouth after use , Disp: , Rfl:     furosemide (LASIX) 40 mg tablet, Take 1 tablet (40 mg total) by mouth daily, Disp: 100 tablet, Rfl: 3    methocarbamol (ROBAXIN) 500 mg tablet, Take 1 tablet (500 mg total) by mouth 3 (three) times a day as needed for muscle spasms, Disp: 30 tablet, Rfl: 0    metoprolol succinate (TOPROL-XL) 25 mg 24 hr tablet, Take 2 tablets (50 mg total) by mouth 2 (two) times a day, Disp: 100 tablet, Rfl: 3    Multiple Vitamin (multivitamin) tablet, Take 1 tablet by mouth daily, Disp: , Rfl:     nitroglycerin (NITROSTAT) 0 4 mg SL tablet, DISSOLVE ONE TABLET UNDER THE TONGUE  PRN, Disp: , Rfl:     potassium chloride (Klor-Con) 10 mEq tablet, Take 2 tablets (20 mEq total) by mouth daily, Disp: 200 tablet, Rfl: 3    rosuvastatin (CRESTOR) 40 MG tablet, Take 1 tablet (40 mg total) by mouth daily, Disp: 100 tablet, Rfl: 3    Tiotropium Bromide Monohydrate (SPIRIVA RESPIMAT IN), Inhale, Disp: , Rfl:     ALLERGIES:  Allergies   Allergen Reactions    Lisinopril Swelling and Cough    Tetanus Antitoxin Anaphylaxis    Tetanus Toxoid Anaphylaxis and Swelling           REVIEW OF SYSTEMS:  Review of Systems   Constitutional: Negative for chills, fatigue, fever and unexpected weight change  HENT: Negative for ear pain, hearing loss, nosebleeds and sore throat  Eyes: Negative for pain, redness and visual disturbance  Respiratory: Negative for cough, shortness of breath and wheezing  Cardiovascular: Negative for chest pain, palpitations and leg swelling  Gastrointestinal: Negative for abdominal pain, nausea and vomiting  Endocrine: Negative for polydipsia and polyuria  Genitourinary: Negative for difficulty urinating, dysuria and hematuria  Musculoskeletal: Negative for arthralgias, back pain, joint swelling and myalgias  Skin: Negative for color change, rash and wound  Neurological: Positive for numbness  Negative for dizziness, seizures, syncope and headaches  Psychiatric/Behavioral: Negative for decreased concentration, dysphoric mood and suicidal ideas  The patient is not nervous/anxious  All other systems reviewed and are negative  VITALS:  Vitals:    03/10/22 1341   BP: 122/79   Pulse: 74   Resp: 17       LABS:  HgA1c:   Lab Results   Component Value Date    HGBA1C 5 7 (H) 07/20/2021     BMP:   Lab Results   Component Value Date    CALCIUM 9 3 02/28/2022    K 3 8 02/28/2022    CO2 34 (H) 02/28/2022     02/28/2022    BUN 20 02/28/2022    CREATININE 1 30 02/28/2022       _____________________________________________________  PHYSICAL EXAMINATION:  General: well developed and well nourished, alert, oriented times 3 and appears comfortable  Psychiatric: Normal  HEENT: Normocephalic, Atraumatic Trachea Midline, No torticollis  Pulmonary: No audible wheezing or respiratory distress   Abdomen/GI: Non tender, non distended   Cardiovascular: No pitting edema, 2+ radial pulse   Skin: No masses, erythema, lacerations, fluctation, ulcerations  Neurovascular: Sensation intact to the Median Nerve, Sensation Intact to the Radial Nerve, Decreased Sensation to  the Ulnar Nerve, Motor Intact to the Median, Ulnar, Radial Nerve and Pulses Intact  Musculoskeletal: Normal, except as noted in detailed exam and in HPI  MUSCULOSKELETAL EXAMINATION:  Left Carpal Tunnel Exam:    Negative thenar atrophy  Negative phalen's test  Negative carpal tunnel compression   Negative tinels over median nerve at the wrist   Opposition strength 5/5  Abduction strength 5/5  Left Ulnar Nerve Exam:    Negative intrinsic atrophy  Negative  deformity at the elbow  Full range of motion with flexion and extension of the elbow  Positive ulnar nerve compression test at the elbow  Negative tinels over the ulnar nerve at the elbow  Positive cross finger test in the index and long fingers  FDP strength is 5/5 to the ring finger  FDP strength is 5/5 to the small finger  Intrinsic strength 5/5      ___________________________________________________  STUDIES REVIEWED:  I have personally reviewed EMG of left upper extremity which demonstrates localized neuropathic process of the ulnar nerve at the elbow (cubital tunnel syndrome) as well as entrapment of the ulnar nerve at the wrist at Guyon's canal   Patient also has entrapment of the median nerve at the wrist consistent with moderate to severe carpal tunnel syndrome            PROCEDURES PERFORMED:  Procedures  No Procedures performed today    _____________________________________________________      Scribe Attestation    I,:  Bruna Vazquez PA-C am acting as a scribe while in the presence of the attending physician :       I,:  Bhavna Faye MD personally performed the services described in this documentation    as scribed in my presence :

## 2022-03-10 NOTE — PATIENT INSTRUCTIONS
What is it Carpal Tunnel Syndrome? Carpal tunnel syndrome (CTS) is a condition brought on by increased pressure on the median nerve at the wrist  In effect, it is a pinched nerve at the wrist  Symptoms may include numbness, tingling, and pain in the arm, hand, and fingers  There is a space in the wrist called the carpal tunnel where the median nerve and nine tendons pass from the forearm into the hand (see Figure 1)  Carpal tunnel syndrome happens when pressure builds up from swelling in this tunnel and puts pressure on the nerve  When the pressure from the swelling becomes great enough to disturb the way the nerve works, numbness, tingling, and pain may be felt in the hand and fingers (see Figure 2)  What causes it? Usually the cause is unknown  Pressure on the nerve can happen several ways: swelling of the lining of the flexor tendons, called tenosynovitis; joint dislocations, fractures, and arthritis can narrow the tunnel; and keeping the wrist bent for long periods of time  Fluid retention during pregnancy can cause swelling in the tunnel and symptoms of carpal tunnel syndrome, which often go away after delivery  Thyroid conditions, rheumatoid arthritis, and diabetes also can be associated with carpal tunnel syndrome  There may be a combination of causes  Signs and symptoms  Carpal tunnel syndrome symptoms usually include pain, numbness, tingling, or a combination of the three  The numbness or tingling most often takes place in the thumb, index, middle, and ring fingers  The symptoms usually are felt during the night but also may be noticed during daily activities such as driving or reading a newspaper  Patients may sometimes notice a weaker , occasional clumsiness, and a tendency to drop things  In severe cases, sensation may be permanently lost and the muscles at the base of the thumb slowly shrink (thenar atrophy), causing difficulty with pinch      Diagnosis  A detailed history including medical conditions, how the hands have been used, and whether there were any prior injuries is important  An x-ray may be taken to check for the other causes of the complaints such as arthritis or a fracture  In some cases, laboratory tests may be done if there is a suspected medical condition that is associated with CTS  Electrodiagnostic studies (NCV-nerve conduction velocities and EMG-electromyogram) may be done to confirm the diagnosis of carpal tunnel syndrome as well as to check for other possible nerve problems  Treatment  Symptoms may often be relieved without surgery  Identifying and treating medical conditions, changing the patterns of hand use, or keeping the wrist splinted in a straight position may help reduce pressure on the nerve  Wearing wrist splints at night may relieve the symptoms that interfere with sleep  A steroid injection into the carpal tunnel may help relieve the symptoms by reducing swelling around the nerve  When symptoms are severe or do not improve, surgery may be needed to make more room for the nerve  Pressure on the nerve is decreased by cutting the ligament that forms the roof (top) of the tunnel on the palm side of the hand (see Figure 3)  Incisions for this surgery may vary, but the goal is the same: to enlarge the tunnel and decrease pressure on the nerve  Following surgery, soreness around the incision may last for several weeks or months  The numbness and tingling may disappear quickly or slowly  It may take several months for strength in the hand and wrist to return to normal  Carpal tunnel symptoms may not completely go away after surgery, especially in severe cases  © 2012 [employer]  www handcare  org      Cubital Tunnel Syndrome  What many people call the funny bone really is a nerve  This ulnar nerve runs behind a bone in the elbow through a space called the cubital tunnel (Figure 1)   Although banging the funny bone usually causes temporary symptoms, chronic pressure on or stretching of the nerve can affect the blood supply to the ulnar nerve, causing numbness or tingling in the ring and small fingers, pain in the forearm, and/or weakness in the hand  This is called cubital tunnel syndrome      Causes  There are a few causes of this ulnar nerve problem  These include:  Pressure  Because the nerve runs through that funny bone groove and has little padding over it, direct pressure (like leaning your arm on an arm rest) can compress the nerve, causing your arm and hand--especially the ring and small fingers--to fall asleep      Stretch  Keeping the elbow bent for a long time can stretchthe nerve behind the elbow  This usually happens during sleep  Anatomy  Sometimes, the ulnar nerve does not stay in its place and snaps back and forth over a bony bump as the elbow is moved  Repetitive snapping can irritate the nerve  Sometimes, the soft tissues over the nerve become thicker or there is an extra muscle over the nerve that can keep the nerve from working correctly  Signs and Symptoms  Cubital tunnel syndrome can cause pain, loss of sensation, and/or tingling  Pins and needles usually are felt in the ring and small fingers  These symptoms are often felt when the elbow is kept bent for a long time, such as while holding a phone or while sleeping  Some people feel weak or clumsy  Loss of sensation and loss of strength or muscle in the hand is serious  Diagnosis  Your doctor will be able to tell a lot by asking you about your symptoms and examining you  S/he might test you for other medical problems like diabetes or thyroid disease  A test called electromyography (EMG) and/or nerve conduction study (NCS) might be needed to see how much the nerve and muscle are being affected  This test also checks for other problems like a pinched nerve in the neck, which can cause similar symptoms      Treatment  The first treatment is to avoid actions that cause symptoms  Wrapping a pillow or towel around the elbow or wearing a splint at night to keep the elbow from bending during sleep can help  Avoiding leaning on the funny bone part of the elbow can help also  A hand therapist can help you learn ways to avoid pressure on the nerve  When symptoms are severe or not getting better, surgery may be needed to relieve the pressure on the nerve  This can involve releasing the nerve, moving the nerve to the front of the elbow, and/or removing a part of the bone  Your surgeon will talk to you about what is the right option for you and guide your care  Therapy sometimes is needed after surgery, and the time it takes to recover varies  Numbness and tingling may improve quickly or slowly, and it may take many months for the strength in your hand to improve  Cubital tunnel symptoms may not totally go away after surgery, especially if symptoms are severe  © 2012 [employer]  www handcare  org

## 2022-03-11 ENCOUNTER — TELEPHONE (OUTPATIENT)
Dept: CARDIOLOGY CLINIC | Facility: CLINIC | Age: 77
End: 2022-03-11

## 2022-03-11 DIAGNOSIS — I10 ESSENTIAL HYPERTENSION: ICD-10-CM

## 2022-03-11 DIAGNOSIS — I49.3 PVC (PREMATURE VENTRICULAR CONTRACTION): Primary | ICD-10-CM

## 2022-03-11 RX ORDER — METOPROLOL SUCCINATE 50 MG/1
50 TABLET, EXTENDED RELEASE ORAL 2 TIMES DAILY
Qty: 60 TABLET
Start: 2022-03-11 | End: 2022-03-14 | Stop reason: SDUPTHER

## 2022-03-11 RX ORDER — METOPROLOL SUCCINATE 25 MG/1
25 TABLET, EXTENDED RELEASE ORAL DAILY
Qty: 30 TABLET | Refills: 3 | Status: SHIPPED | OUTPATIENT
Start: 2022-03-11 | End: 2022-07-14 | Stop reason: SDUPTHER

## 2022-03-11 NOTE — TELEPHONE ENCOUNTER
Called spoke with patient about recent holter monitor results  Since PVCs are still frequent but overall burden has improved and patient is still having symptoms will increase metoprolol succinate to 75 mg in the morning and 50 mg in the evening from his current 50 mg twice daily dosing  I have sent new prescriptions to the pharmacy for a 25 mg tablet to be taken daily in the morning and 50 mg tablet to be taken 1 twice daily  Patient noted complete understanding  Will repeat 24 hour Holter monitor in 1 week on new regimen in patient noted understanding and I will have my office staff call patient help him set this up  Patient noted that he understood was agreeable to plan and thanked me for calling

## 2022-03-14 DIAGNOSIS — I10 ESSENTIAL HYPERTENSION: ICD-10-CM

## 2022-03-14 RX ORDER — METOPROLOL SUCCINATE 50 MG/1
50 TABLET, EXTENDED RELEASE ORAL 2 TIMES DAILY
Qty: 180 TABLET | Refills: 3 | Status: SHIPPED | OUTPATIENT
Start: 2022-03-14

## 2022-03-18 ENCOUNTER — HOSPITAL ENCOUNTER (OUTPATIENT)
Dept: NON INVASIVE DIAGNOSTICS | Facility: CLINIC | Age: 77
Discharge: HOME | End: 2022-03-18
Payer: COMMERCIAL

## 2022-03-18 DIAGNOSIS — I10 ESSENTIAL HYPERTENSION: ICD-10-CM

## 2022-03-18 DIAGNOSIS — I49.3 PVC (PREMATURE VENTRICULAR CONTRACTION): ICD-10-CM

## 2022-03-18 PROCEDURE — 93226 XTRNL ECG REC<48 HR SCAN A/R: CPT

## 2022-03-18 PROCEDURE — 93225 XTRNL ECG REC<48 HRS REC: CPT

## 2022-03-22 ENCOUNTER — OFFICE VISIT (OUTPATIENT)
Dept: CARDIOLOGY CLINIC | Facility: CLINIC | Age: 77
End: 2022-03-22
Payer: COMMERCIAL

## 2022-03-22 VITALS
WEIGHT: 215 LBS | HEART RATE: 60 BPM | DIASTOLIC BLOOD PRESSURE: 60 MMHG | HEIGHT: 68 IN | SYSTOLIC BLOOD PRESSURE: 110 MMHG | BODY MASS INDEX: 32.58 KG/M2

## 2022-03-22 DIAGNOSIS — I49.3 PVC (PREMATURE VENTRICULAR CONTRACTION): ICD-10-CM

## 2022-03-22 DIAGNOSIS — E66.09 CLASS 1 OBESITY DUE TO EXCESS CALORIES WITH BODY MASS INDEX (BMI) OF 32.0 TO 32.9 IN ADULT, UNSPECIFIED WHETHER SERIOUS COMORBIDITY PRESENT: ICD-10-CM

## 2022-03-22 DIAGNOSIS — J41.8 MIXED SIMPLE AND MUCOPURULENT CHRONIC BRONCHITIS (HCC): ICD-10-CM

## 2022-03-22 DIAGNOSIS — I65.23 BILATERAL CAROTID ARTERY STENOSIS: ICD-10-CM

## 2022-03-22 DIAGNOSIS — G47.33 OBSTRUCTIVE SLEEP APNEA SYNDROME: ICD-10-CM

## 2022-03-22 DIAGNOSIS — I25.10 CORONARY ARTERY DISEASE INVOLVING NATIVE CORONARY ARTERY OF NATIVE HEART WITHOUT ANGINA PECTORIS: Primary | ICD-10-CM

## 2022-03-22 DIAGNOSIS — I10 PRIMARY HYPERTENSION: ICD-10-CM

## 2022-03-22 PROCEDURE — 99214 OFFICE O/P EST MOD 30 MIN: CPT | Performed by: INTERNAL MEDICINE

## 2022-03-22 PROCEDURE — 1036F TOBACCO NON-USER: CPT | Performed by: INTERNAL MEDICINE

## 2022-03-22 PROCEDURE — 93227 XTRNL ECG REC<48 HR R&I: CPT | Performed by: INTERNAL MEDICINE

## 2022-03-22 PROCEDURE — 1160F RVW MEDS BY RX/DR IN RCRD: CPT | Performed by: INTERNAL MEDICINE

## 2022-03-22 NOTE — PROGRESS NOTES
Cardiology Follow Up    Manpreet Beckett  [id number]  1945  [facility] [place] [id number] [address] [facility] [id number]      1  Coronary artery disease involving native coronary artery of native heart without angina pectoris     2  Obstructive sleep apnea syndrome     3  Mixed simple and mucopurulent chronic bronchitis ([address] )     4  Primary hypertension     5  Bilateral carotid artery stenosis     6  Class 1 obesity due to excess calories with body mass index (BMI) of 32 0 to 32 9 in adult, unspecified whether serious comorbidity present     7  PVC (premature ventricular contraction)         Discussion/Summary:  1  Coronary artery disease with history of PCI with cardiac catheterization 03/18/2021 showing stable disease without need for revascularization  2  Cardiomyopathy LVEF 40-45% possibly mixed picture in the setting of frequent PVCs and known coronary disease  3  Mild aortic stenosis with mild-to-moderate aortic regurgitation  4  Mild-to-moderate mitral regurgitation  5  Hypertension  6  Hyperlipidemia  7   Frequent PVCs        -patient notes adequate blood pressure control at home without significant issue on new regimen of metoprolol   -as Holter monitor shows improvement in PVC burden to now 4 8% will continue current medical regimen  -will have patient follow-up with electrophysiology for continued monitoring as well as potential alternative medical therapy if he were no longer to need antibiotics  -will continue aspirin 81 mg daily, furosemide 40 mg daily, metoprolol succinate 75 mg in the morning and 50 mg in the evening, 20 mEq potassium daily (10 in a m , 10 in p m ) Crestor 40 mg daily  -will continue monitor patient clinically at this time and counseled patient on dietary and lifestyle modifications including following a low-salt low-fat heart healthy diet with fluid restriction to less than 2000 mL of fluid daily and salt restriction less than 2000 mg sodium daily  -patient will continue to monitor weights at home along with monitoring if there are any significant shortness of breath or lower extremity swelling   -I will see patient in 3 months or sooner if necessary  -patient will monitor home blood pressure readings and heart rates and let us know if there is any change or abnormality  -patient counseled if he were to have any warning or alarm type symptoms he is to seek emergency medical care immediately  History of Present Illness:  - patient is a 25-year-old male with hypertension, hyperlipidemia, obesity, COPD with intermittent and occasional oxygen use on chronic oral antibiotics, obstructive sleep apnea compliant with CPAP therapy and known coronary artery disease with MI in 1995 with PCI top RCA stenting in 1999 and in 2001 involving the RCA and then in 2003 with stenting to the LAD with cardiac catheterization a year ago in March 18th 2021 showing left main with no significant disease, proximal LAD with 10% stenosis at site of prior stent, left circumflex artery with minimal luminal irregularities, mid RCA with 50% stenosis at site of prior stent and posterolateral segments with distal disease supplied by collaterals from the distal circumflex with no significant coronary artery disease requiring revascularization at that time  Patient was found to have reduced LV systolic function and was found have significant burden of PVCs and therefore was seen by electrophysiology however in the setting of chronic antibiotic therapy was recommended that it patient was able to tolerate uptitration of beta-blocker therapy to try this prior to transitioning to alternative medical therapy  -currently in the office patient denies any chest pain, palpitations, lightheadedness or dizziness, loss of consciousness, shortness of breath or lower extremity swelling    He notes that overall he is doing well except for some chronic back issues that he has been suffering with  He notes that intermittently he does get some left upper chest pain near his left shoulder that does not radiate lasts only a second or 2  It does not seem to be exertional in nature and happens sporadically  And states this is not like any of his prior symptoms that required coronary intervention        Patient Active Problem List   Diagnosis    Hyperlipidemia    Coronary artery disease involving native coronary artery of native heart without angina pectoris    Bilateral carotid artery stenosis    Hypertension    Oxygen dependent    Depression    COPD [facility]ch[facility] obstructive [facility] disease) [facility]    S/P carotid endarterectomy    Stented coronary artery    Vertigo    Anisometropia    Osteoarthritis of spinal facet joint    Chronic hypokalemia    Chronic ischemic heart disease    Chronic diastolic (congestive) heart failure ([facility])    Diverticular disease of colon    Dry eye syndrome    Gastroesophageal reflux disease    Hearing loss    High prostate specific antigen (PSA)    Hyperglycemia    Hypoxemia    Lens replaced by other means    Lumbar radiculopathy    Multinodular goiter    Nuclear senile cataract    Obesity    Occlusion and stenosis of carotid artery    Osteoarthritis of hip    Prediabetes    Psychosexual dysfunction with inhibited sexual excitement    Sleep apnea    Solitary pulmonary nodule    Thyroid nodule    Visual disturbance    Generalized abdominal pain    Cubital tunnel syndrome on left    Costochondral chest pain    Chest pain in adult    Abnormal nuclear stress test    Discoloration of skin    Right flank pain    Right hip pain    Primary insomnia    Chronic right-sided thoracic back pain    Abdominal bloating     Past Medical History:   Diagnosis Date    Bilateral carotid artery stenosis     Chronic diastolic heart failure ([facility])     Chronic ischemic heart disease     [facility]) [facility]     Coronary artery disease     hx stents, MI, PCI    Hearing loss     Hyperlipidemia     Hypertension     [facility] infarction) [facility] [address]     S/P carotid endarterectomy     Sleep apnea     Sleep apnea, obstructive     Stented coronary artery      Social History     Socioeconomic History    Marital status: /Civil Union     Spouse name: Not on file    Number of children: Not on file    Years of education: Not on file    Highest education level: Not on file   Occupational History    Not on file   Tobacco Use    Smoking status: Former Smoker     Quit date:      Years since quittin 2    Smokeless tobacco: Never Used   Vaping Use    Vaping Use: Never used   Substance and Sexual Activity    Alcohol use: Not Currently    Drug use: Never    Sexual activity: Not on file   Other Topics Concern    Not on file   Social History Narrative    Not on file     Social Determinants of Health     Financial Resource Strain: Not on file   Food Insecurity: Not on file   Transportation Needs: Not on file   Physical Activity: Not on file   Stress: Not on file   Social Connections: Not on file   Intimate Partner Violence: Not on file   Housing Stability: Not on file      Family History   Problem Relation Age of Onset    Heart attack Mother     Dementia Mother      Past Surgical History:   Procedure Laterality Date    CARDIAC CATHETERIZATION  2021    left main with no significant disease, proximal LAD with 10% stenosis at the site of prior stent, left circumflex artery with minimal luminal irregularities mid RCA with 50% stenosis at site of prior stent and PL segment with distal disease supplied by collaterals from the distal circumflex with no significant CAD requiring revascularization at that time      COLONOSCOPY      CORONARY ANGIOPLASTY WITH STENT PLACEMENT      RCA    CORONARY ANGIOPLASTY WITH STENT PLACEMENT  2001    RCA    CORONARY ANGIOPLASTY WITH STENT PLACEMENT  2003    LAD    EYE SURGERY      SKIN CANCER EXCISION      arm       Current Outpatient Medications:     albuterol (5 mg/mL) 0 5 % nebulizer solution, 2 5 mg every 6 (six) hours as needed , Disp: , Rfl:     Ascorbic Acid (vitamin C) 1000 MG tablet, Take 1,000 mg by mouth daily, Disp: , Rfl:     aspirin 81 mg chewable tablet, CHEW ONE TABLET BY MOUTH EVERY DAY, Disp: , Rfl:     azithromycin (ZITHROMAX) 250 mg tablet, Take 1 every Monday, Wednesday and Friday , Disp: 45 tablet, Rfl: 3    Blood Pressure Monitor KIT, Use daily, Disp: 1 kit, Rfl: 0    famotidine (PEPCID) 20 mg tablet, Take 1 tablet (20 mg total) by mouth daily, Disp: 100 tablet, Rfl: 3    fluticasone (FLONASE) 50 mcg/act nasal spray, into each nostril as needed , Disp: , Rfl:     fluticasone-salmeterol (Wixela Inhub) 500-50 mcg/dose inhaler, Inhale 1 puff in the morning Rinse mouth after use , Disp: , Rfl:     furosemide (LASIX) 40 mg tablet, Take 1 tablet (40 mg total) by mouth daily, Disp: 100 tablet, Rfl: 3    metoprolol succinate (TOPROL-XL) 25 mg 24 hr tablet, Take 1 tablet (25 mg total) by mouth daily, Disp: 30 tablet, Rfl: 3    metoprolol succinate (TOPROL-XL) 50 mg 24 hr tablet, Take 1 tablet (50 mg total) by mouth 2 (two) times a day, Disp: 180 tablet, Rfl: 3    Multiple Vitamin (multivitamin) tablet, Take 1 tablet by mouth daily, Disp: , Rfl:     nitroglycerin (NITROSTAT) 0 4 mg SL tablet, DISSOLVE ONE TABLET UNDER THE TONGUE  PRN, Disp: , Rfl:     potassium chloride (Klor-Con) 10 mEq tablet, Take 2 tablets (20 mEq total) by mouth daily, Disp: 200 tablet, Rfl: 3    rosuvastatin (CRESTOR) 40 MG tablet, Take 1 tablet (40 mg total) by mouth daily, Disp: 100 tablet, Rfl: 3    Tiotropium Bromide Monohydrate (SPIRIVA RESPIMAT IN), Inhale, Disp: , Rfl:     methocarbamol (ROBAXIN) 500 mg tablet, Take 1 tablet (500 mg total) by mouth 3 (three) times a day as needed for muscle spasms, Disp: 30 tablet, Rfl: 0  Allergies   Allergen Reactions    Lisinopril Swelling and Cough    Tetanus Antitoxin Anaphylaxis    Tetanus Toxoid Anaphylaxis and Swelling         Labs:  Appointment on 02/28/2022   Component Date Value    Sodium 02/28/2022 141     Potassium 02/28/2022 3 8     Chloride 02/28/2022 101     CO2 02/28/2022 34*    ANION GAP 02/28/2022 6     BUN 02/28/2022 20     Creatinine 02/28/2022 1 30     Glucose 02/28/2022 93     Calcium 02/28/2022 9 3     eGFR 02/28/2022 52    Office Visit on 02/17/2022   Component Date Value    Magnesium 02/28/2022 2 1         Imaging: CT abdomen pelvis wo contrast    Result Date: 3/13/2022  Narrative: CT ABDOMEN AND PELVIS WITHOUT IV CONTRAST INDICATION:   R14 0: Abdominal distension (gaseous)  COMPARISON:  None  TECHNIQUE:  CT examination of the abdomen and pelvis was performed without intravenous contrast   Axial, sagittal, and coronal 2D reformatted images were created from the source data and submitted for interpretation  Radiation dose length product (DLP) for this visit:  609 7 mGy-cm   This examination, like all CT scans performed in the [facility], was performed utilizing techniques to minimize radiation dose exposure, including the use of iterative reconstruction and automated exposure control  Enteric contrast was not administered  FINDINGS: Absence of intravenous contrast enhancement limits evaluation of the solid abdominal viscera  ABDOMEN LOWER CHEST:  No clinically significant abnormality identified in the visualized lower chest  LIVER/BILIARY TREE:  Unremarkable  GALLBLADDER:  No calcified gallstones  No pericholecystic inflammatory change  SPLEEN:  Unremarkable  PANCREAS:  Unremarkable  ADRENAL GLANDS:  Unremarkable  KIDNEYS/URETERS:  No hydronephrosis or urinary tract calculus    One or more sharply circumscribed subcentimeter renal hypodensities are present, too small to accurately characterize, and statistically most likely benign findings  According to recent literature (Radiology 2019) no further workup of these findings is recommended  STOMACH AND BOWEL:  There is colonic diverticulosis without evidence of acute diverticulitis  Small discoid hyperdensity noted in the cecum suggestive of an ingested medication  APPENDIX:  No findings to suggest appendicitis  ABDOMINOPELVIC CAVITY:  No ascites  No pneumoperitoneum  No lymphadenopathy  VESSELS:  Atherosclerotic changes are present  No evidence of aneurysm  PELVIS REPRODUCTIVE ORGANS:  The prostate is mildly enlarged  URINARY BLADDER:  Unremarkable  ABDOMINAL WALL/INGUINAL REGIONS:  Unremarkable  OSSEOUS STRUCTURES:  No acute fracture or destructive osseous lesion  Impression: No acute pathology  No etiology for abdominal bloating identified  Colonic diverticulosis without diverticulitis  Mild prostatomegaly  Workstation performed: [id number]     CT chest without contrast    Result Date: 3/3/2022  Narrative: CT CHEST WITHOUT IV CONTRAST INDICATION:   R91 1: Solitary pulmonary nodule  COMPARISON:  CT chest 2/5/2021 TECHNIQUE: CT examination of the chest was performed without intravenous contrast   Axial, sagittal, and coronal 2D reformatted images were created from the source data and submitted for interpretation  Radiation dose length product (DLP) for this visit:  474 49 mGy-cm   This examination, like all CT scans performed in the [facility], was performed utilizing techniques to minimize radiation dose exposure, including the use of iterative  reconstruction and automated exposure control  FINDINGS: LUNGS:  Extensive emphysematous changes of the lung fields, centrilobular in distribution  Scattered tiny nodules again noted bilaterally many of which are centrilobular in distribution with tree-in-bud opacities  This is most extensive in the right lower lobe  Overall appearance is stable   Scattered calcified granulomata bilaterally  The previously noted tiny pulmonary nodules are now calcified granuloma  For example, previously noted 3 mm right lower lobe nodule is now clearly calcified image 94 series 3  PLEURA:  Unremarkable  HEART/GREAT VESSELS: Heart is normal size  Extensive coronary artery calcifications  Aortic valvular calcification  No thoracic aortic aneurysm  MEDIASTINUM AND ZION:  Unremarkable  CHEST WALL AND LOWER NECK: Nodular appearance to the thyroid gland with subcentimeter nodules  Incidental discovery of one or more thyroid nodule(s) measuring less than 1 5 cm and without suspicious features is noted in this patient who is above 28years old; according to guidelines published in the February 2015 white paper on incidental thyroid nodules in the Journal of the [employer] of Radiology [facility]), no further evaluation is recommended  Stable symmetric gynecomastia  VISUALIZED STRUCTURES IN THE UPPER ABDOMEN:  Stable subcentimeter hypodensity at the spleen anteriorly, too small to characterize  OSSEOUS STRUCTURES:  No acute fracture or destructive osseous lesion  Degenerative spurring of the visualized spine with variable bridging marginal osteophyte formation  Impression: 1  Scattered calcified granulomata bilaterally  The previously noted tiny pulmonary nodules are now more clearly calcified granuloma  No new suspicious nodules  2   Additional scattered tiny nodules bilaterally many of which are centrilobular in distribution with tree-in-bud opacities  Findings suggest chronic bronchiolitis  This is most extensive in the right lower lobe  Overall appearance is stable  Workstation performed: [id number]     Holter monitor    Result Date: 3/22/2022  Narrative: 24 hour Holter monitor report INDICATIONS: PVCs DESCRIPTION OF FINDINGS: The patient was monitored for a total of 24 hours  The patient was predominantly in sinus rhythm throughout the study    The average heart rate was 73 beats per minute  The heart rate ranged from a low of 53 beats per minute in sinus bradycardia at 12:21 p m  to a maximum of 101 beats per minute in sinus tachycardia at 3:57 p m  [facility] Ventricular ectopic activity consisted of 4659 beats (4 8% of total beats), of which, 4520 were single PVCs, 23 were ventricular couplets, 15 were in bigeminy and 71 were in trigeminy  There were 2 ventricular runs the longest lasting 4 beats at 8:24 p m  without symptoms documented  There was no sustained ventricular tachycardia  Supraventricular ectopic activity consisted of 15 beats (0 0% of total beats), of which, 14 were single PACs, 1 late beat, 0 were atrial couplets, 0 were in bigeminy and 0 were in trigeminy  There were 0 atrial runs, longest of which was 0 beats  There was no supraventricular tachycardia identified  There was no evidence of atrial fibrillation or atrial flutter  There were no significant pauses  The longest R-R interval was 1 6 seconds  There was no evidence of advanced degree heart block  The accompanying patient diary notes symptoms of shortness of breath and chest pain in left side  Correlation with the tracings at these times reveals sinus rhythm with PVCs with heart rates 71-81 beats per minute  Impression: Predominantly sinus rhythm, with an average heart rate of 73 beats per minute There was rare supraventricular ectopic activity with no atrial fibrillation or atrial flutter present  There was frequent ventricular ectopic activity accounting for 4 8% of total monitored beats with 2 short asymptomatic ventricular runs the longest lasting 4 beats at 8:24 p m  with no sustained ventricular tachycardia present  No significant pauses or advanced degree heart block The accompanying patient diary notes symptoms of shortness of breath and chest pain in left side  Correlation with the tracings at these times reveals sinus rhythm with PVCs with heart rates 71-81 beats per minute       Holter monitor    Result Date: 3/10/2022  Narrative: 24 hour Holter monitor report INDICATIONS:  PVCs DESCRIPTION OF FINDINGS: The patient was monitored for a total of 24 hours  The patient was predominantly in sinus rhythm throughout the study  The average heart rate was 74 beats per minute  The heart rate ranged from a low of 55 beats per minute in sinus bradycardia at 10:06 a m  to a maximum of 103 beats per minute in sinus tachycardia at 2:52 a m  [facility] Ventricular ectopic activity consisted of 4724 beats (5 2% of total beats), of which, 4342 were single PVCs, 169 were ventricular couplets, 22 were in bigeminy and 13 were in trigeminy  There was 1 nonsustained ventricular run longest lasting 9 beats with fastest at 128 beats per minute at 1:21 p m  [facility] There was no sustained  ventricular tachycardia  Supraventricular ectopic activity consisted of 34 beats (0 0% of total beats), of which, 16 were single PACs, 0 were atrial couplets, 0 were in bigeminy and 0 were in trigeminy  There were 4 atrial runs, longest of which was 6 beats  There was no evidence of atrial fibrillation or atrial flutter  There were no significant pauses  The longest R-R interval was 1 6 seconds  There was no evidence of advanced degree heart block  The accompanying patient diary notes symptoms of lightheaded and short of breath  Correlation with the tracings at these times reveals sinus rhythm and sinus rhythm with isolated PVCs  Impression: Predominantly sinus rhythm, with an average heart rate of 74 beats per minute There was rare supraventricular ectopic activity with no atrial fibrillation or atrial flutter present  There was frequent ventricular ectopic activity accounting for 5 2% of total monitored beats  There was 1 nonsustained ventricular tachycardia run lasting 9 beats with fastest rate at 128 beats per minute at 1:21 p m  that did not have any symptoms listed  There was no sustained ventricular tachycardia seen   No significant pauses or advanced degree heart block The accompanying patient diary notes symptoms of lightheaded and short of breath  Correlation with the tracings at these times reveals sinus rhythm and sinus rhythm with isolated PVCs  Review of Systems:  Review of Systems   Constitutional: Negative for chills, diaphoresis, fatigue, fever and unexpected weight change  HENT: Negative for trouble swallowing and voice change  Eyes: Negative for pain and redness  Respiratory: Negative for shortness of breath and wheezing  Cardiovascular: Negative for chest pain, palpitations and leg swelling  Gastrointestinal: Negative for abdominal pain, constipation, diarrhea, nausea and vomiting  Genitourinary: Negative for dysuria  Musculoskeletal: Positive for arthralgias and back pain  Negative for neck pain and neck stiffness  Skin: Negative for rash  Neurological: Negative for dizziness, syncope, light-headedness and headaches  Psychiatric/Behavioral: Negative for agitation and confusion  All other systems reviewed and are negative  Vitals:    03/22/22 1309   BP: 110/60   Pulse: 60   Weight: 97 5 kg (215 lb)   Height: 5' 8" (1 727 m)     Vitals:    03/22/22 1309   Weight: 97 5 kg (215 lb)     Height: 5' 8" (172 7 cm)     Physical Exam:  Physical Exam  Vitals reviewed  Constitutional:       General: He is not in acute distress  Appearance: He is obese  He is not diaphoretic  HENT:      Head: Normocephalic and atraumatic  Eyes:      General:         Right eye: No discharge  Left eye: No discharge  Neck:      Comments: Trachea midline, no JVD present  Cardiovascular:      Rate and Rhythm: Normal rate and regular rhythm  Heart sounds: No friction rub  Pulmonary:      Effort: Pulmonary effort is normal  No respiratory distress  Breath sounds: No wheezing  Comments: Decreased breath sounds bilaterally  Abdominal:      General: Bowel sounds are normal       Palpations: Abdomen is soft  Tenderness: There is no abdominal tenderness  Musculoskeletal:      Right lower leg: No edema  Left lower leg: No edema  Skin:     General: Skin is warm and dry  Neurological:      Mental Status: He is alert        Comments: Awake, alert, able to answer questions appropriately, able move extremities bilaterally   Psychiatric:         Mood and Affect: Mood normal          Behavior: Behavior normal

## 2022-04-04 ENCOUNTER — RA CDI HCC (OUTPATIENT)
Dept: OTHER | Facility: HOSPITAL | Age: 77
End: 2022-04-04

## 2022-04-04 NOTE — PROGRESS NOTES
[facility]  coding opportunities     I13 0, I42 9 and J44 9     Chart Reviewed number of suggestions sent to Provider: 3     Patients Insurance     Medicare Insurance: [address] [place] [address]

## 2022-04-14 ENCOUNTER — OFFICE VISIT (OUTPATIENT)
Dept: FAMILY MEDICINE CLINIC | Facility: CLINIC | Age: 77
End: 2022-04-14
Payer: COMMERCIAL

## 2022-04-14 VITALS
RESPIRATION RATE: 18 BRPM | HEART RATE: 74 BPM | WEIGHT: 213.8 LBS | BODY MASS INDEX: 32.4 KG/M2 | OXYGEN SATURATION: 95 % | SYSTOLIC BLOOD PRESSURE: 118 MMHG | TEMPERATURE: 98 F | DIASTOLIC BLOOD PRESSURE: 60 MMHG | HEIGHT: 68 IN

## 2022-04-14 DIAGNOSIS — Z00.00 MEDICARE ANNUAL WELLNESS VISIT, SUBSEQUENT: ICD-10-CM

## 2022-04-14 DIAGNOSIS — R49.9 HOARSENESS OR CHANGING VOICE: ICD-10-CM

## 2022-04-14 DIAGNOSIS — N39.43 BENIGN PROSTATIC HYPERPLASIA WITH POST-VOID DRIBBLING: ICD-10-CM

## 2022-04-14 DIAGNOSIS — N40.1 BENIGN PROSTATIC HYPERPLASIA WITH POST-VOID DRIBBLING: ICD-10-CM

## 2022-04-14 DIAGNOSIS — I65.23 BILATERAL CAROTID ARTERY STENOSIS: ICD-10-CM

## 2022-04-14 DIAGNOSIS — Z11.59 NEED FOR HEPATITIS C SCREENING TEST: ICD-10-CM

## 2022-04-14 DIAGNOSIS — I10 PRIMARY HYPERTENSION: Primary | ICD-10-CM

## 2022-04-14 DIAGNOSIS — J41.8 MIXED SIMPLE AND MUCOPURULENT CHRONIC BRONCHITIS (HCC): ICD-10-CM

## 2022-04-14 DIAGNOSIS — Z12.5 SCREENING FOR PROSTATE CANCER: ICD-10-CM

## 2022-04-14 DIAGNOSIS — N18.31 STAGE 3A CHRONIC KIDNEY DISEASE (HCC): ICD-10-CM

## 2022-04-14 DIAGNOSIS — I25.10 CORONARY ARTERY DISEASE INVOLVING NATIVE CORONARY ARTERY OF NATIVE HEART WITHOUT ANGINA PECTORIS: ICD-10-CM

## 2022-04-14 DIAGNOSIS — E78.5 HYPERLIPIDEMIA, UNSPECIFIED HYPERLIPIDEMIA TYPE: ICD-10-CM

## 2022-04-14 PROCEDURE — 1101F PT FALLS ASSESS-DOCD LE1/YR: CPT | Performed by: INTERNAL MEDICINE

## 2022-04-14 PROCEDURE — 1170F FXNL STATUS ASSESSED: CPT | Performed by: INTERNAL MEDICINE

## 2022-04-14 PROCEDURE — 3288F FALL RISK ASSESSMENT DOCD: CPT | Performed by: INTERNAL MEDICINE

## 2022-04-14 PROCEDURE — 99214 OFFICE O/P EST MOD 30 MIN: CPT | Performed by: INTERNAL MEDICINE

## 2022-04-14 PROCEDURE — G0439 PPPS, SUBSEQ VISIT: HCPCS | Performed by: INTERNAL MEDICINE

## 2022-04-14 PROCEDURE — 1125F AMNT PAIN NOTED PAIN PRSNT: CPT | Performed by: INTERNAL MEDICINE

## 2022-04-14 NOTE — PROGRESS NOTES
Assessment and Plan:     Problem List Items Addressed This Visit        Respiratory    COPD (chronic obstructive pulmonary disease) ([address] )    Relevant Orders    CBC and differential    Comprehensive metabolic panel       Cardiovascular and Mediastinum    Coronary artery disease involving native coronary artery of native heart without angina pectoris    Relevant Orders    CBC and differential    Comprehensive metabolic panel    Lipid Panel with Direct LDL reflex    Bilateral carotid artery stenosis    Relevant Orders    VAS carotid complete study    Hypertension    Relevant Orders    CBC and differential    Comprehensive metabolic panel       Genitourinary    Stage 3a chronic kidney disease (HCC)    Relevant Orders    CBC and differential    Comprehensive metabolic panel    Urinalysis with microscopic    Microalbumin / creatinine urine ratio    Protein / creatinine ratio, urine       Other    Hyperlipidemia    Relevant Orders    Lipid Panel with Direct LDL reflex    Hoarseness or changing voice      Other Visit Diagnoses     Medicare annual wellness visit, subsequent    -  Primary    Benign prostatic hyperplasia with post-void dribbling        Need for hepatitis C screening test        Relevant Orders    Hepatitis C antibody    Screening for prostate cancer        Relevant Orders    PSA, Total Screen        BMI Counseling: Body mass index is 32 51 kg/m²  The BMI is above normal  Nutrition recommendations include decreasing portion sizes, encouraging healthy choices of fruits and vegetables, consuming healthier snacks, limiting drinks that contain sugar and moderation in carbohydrate intake  Exercise recommendations include moderate physical activity 150 minutes/week  Rationale for BMI follow-up plan is due to patient being overweight or obese  Preventive health issues were discussed with patient, and age appropriate screening tests were ordered as noted in patient's After Visit Summary    Personalized health advice and appropriate referrals for health education or preventive services given if needed, as noted in patient's After Visit Summary       History of Present Illness:     Patient presents for Medicare Annual Wellness visit    Patient Care Team:  Senia Lee DO as PCP - General (Family Medicine)  Senia Lee DO as PCP - [address] [place] [address] (RTE)  Senia Lee DO as PCP - [facility] (RTE)  Warren Sanders DO (Cardiology)     Problem List:     Patient Active Problem List   Diagnosis    Hyperlipidemia    Coronary artery disease involving native coronary artery of native heart without angina pectoris    Bilateral carotid artery stenosis    Hypertension    Oxygen dependent    Depression    COPD (chronic obstructive pulmonary disease) ([facility] [address] [facility]    S/P carotid endarterectomy    Stented coronary artery    Vertigo    Anisometropia    Osteoarthritis of spinal facet joint    Chronic hypokalemia    Chronic ischemic heart disease    Chronic diastolic (congestive) heart failure (HCC)    Diverticular disease of colon    Dry eye syndrome    Gastroesophageal reflux disease    Hearing loss    High prostate specific antigen (PSA)    Hyperglycemia    Hypoxemia    Lens replaced by other means    Lumbar radiculopathy    Multinodular goiter    Nuclear senile cataract    Obesity    Occlusion and stenosis of carotid artery    Osteoarthritis of hip    Prediabetes    Psychosexual dysfunction with inhibited sexual excitement    Sleep apnea    Solitary pulmonary nodule    Thyroid nodule    Visual disturbance    Generalized abdominal pain    Cubital tunnel syndrome on left    Costochondral chest pain    Chest pain in adult    Abnormal nuclear stress test    Discoloration of skin    Right flank pain    Right hip pain    Primary insomnia    Chronic right-sided thoracic back pain    Abdominal bloating    Stage 3a chronic kidney disease (HCC)    Hoarseness or changing voice Past Medical and Surgical History:     Past Medical History:   Diagnosis Date    Bilateral carotid artery stenosis     Chronic diastolic heart failure (HCC)     Chronic ischemic heart disease     [facility]     Coronary artery disease     hx stents, MI, PCI    Hearing loss     Hyperlipidemia     Hypertension     MI (myocardial infarction) ([address] )     S/P carotid endarterectomy     Sleep apnea     Sleep apnea, obstructive     Stented coronary artery      Past Surgical History:   Procedure Laterality Date    CARDIAC CATHETERIZATION  2021    left main with no significant disease, proximal LAD with 10% stenosis at the site of prior stent, left circumflex artery with minimal luminal irregularities mid RCA with 50% stenosis at site of prior stent and PL segment with distal disease supplied by collaterals from the distal circumflex with no significant CAD requiring revascularization at that time      COLONOSCOPY      CORONARY ANGIOPLASTY WITH STENT PLACEMENT      RCA    CORONARY ANGIOPLASTY WITH STENT PLACEMENT      RCA    CORONARY ANGIOPLASTY WITH STENT PLACEMENT      LAD    EYE SURGERY      SKIN CANCER EXCISION      arm      Family History:     Family History   Problem Relation Age of Onset    Heart attack Mother     Dementia Mother       Social History:     Social History     Socioeconomic History    Marital status: /Civil Union     Spouse name: None    Number of children: None    Years of education: None    Highest education level: None   Occupational History    None   Tobacco Use    Smoking status: Former Smoker     Quit date:      Years since quittin 3    Smokeless tobacco: Never Used   Vaping Use    Vaping Use: Never used   Substance and Sexual Activity    Alcohol use: Not Currently    Drug use: Never    Sexual activity: None   Other Topics Concern    None   Social History Narrative    None     Social Determinants of Health     Financial Resource Strain: Not on file   Food Insecurity: Not on file   Transportation Needs: Not on file   Physical Activity: Not on file   Stress: Not on file   Social Connections: Not on file   Intimate Partner Violence: Not on file   Housing Stability: Not on file      Medications and Allergies:     Current Outpatient Medications   Medication Sig Dispense Refill    albuterol (5 mg/mL) 0 5 % nebulizer solution 2 5 mg every 6 (six) hours as needed       Ascorbic Acid (vitamin C) 1000 MG tablet Take 1,000 mg by mouth daily      aspirin 81 mg chewable tablet CHEW ONE TABLET BY MOUTH EVERY DAY      azithromycin (ZITHROMAX) 250 mg tablet Take 1 every Monday, Wednesday and Friday  45 tablet 3    Blood Pressure Monitor KIT Use daily 1 kit 0    fluticasone (FLONASE) 50 mcg/act nasal spray into each nostril as needed       fluticasone-salmeterol (Wixela Inhub) 500-50 mcg/dose inhaler Inhale 1 puff in the morning Rinse mouth after use        furosemide (LASIX) 40 mg tablet Take 1 tablet (40 mg total) by mouth daily 100 tablet 3    methocarbamol (ROBAXIN) 500 mg tablet Take 1 tablet (500 mg total) by mouth 3 (three) times a day as needed for muscle spasms 30 tablet 0    metoprolol succinate (TOPROL-XL) 25 mg 24 hr tablet Take 1 tablet (25 mg total) by mouth daily 30 tablet 3    metoprolol succinate (TOPROL-XL) 50 mg 24 hr tablet Take 1 tablet (50 mg total) by mouth 2 (two) times a day 180 tablet 3    Multiple Vitamin (multivitamin) tablet Take 1 tablet by mouth daily      nitroglycerin (NITROSTAT) 0 4 mg SL tablet DISSOLVE ONE TABLET UNDER THE TONGUE  PRN      potassium chloride (Klor-Con) 10 mEq tablet Take 2 tablets (20 mEq total) by mouth daily 200 tablet 3    rosuvastatin (CRESTOR) 40 MG tablet Take 1 tablet (40 mg total) by mouth daily 100 tablet 3    Tiotropium Bromide Monohydrate (SPIRIVA RESPIMAT IN) Inhale      famotidine (PEPCID) 20 mg tablet Take 1 tablet (20 mg total) by mouth daily (Patient not taking: Reported on 4/14/2022 ) 100 tablet 3     No current facility-administered medications for this visit  Allergies   Allergen Reactions    Lisinopril Swelling and Cough    Tetanus Antitoxin Anaphylaxis    Tetanus Toxoid Anaphylaxis and Swelling      Immunizations:     Immunization History   Administered Date(s) Administered    COVID-19 MODERNA VACC 0 5 ML IM 01/19/2021, 02/16/2021    INFLUENZA 09/24/2014, 11/30/2015, 12/01/2015, 11/06/2017, 11/10/2017, 09/19/2018, 09/11/2019, 10/01/2020, 10/27/2020    Influenza Quadrivalent 3 years and older 10/13/2016    Influenza, high dose seasonal 0 7 mL 10/19/2021    Pneumococcal Conjugate 13-Valent 11/30/2018    Zoster 03/10/2016    influenza, injectable, quadrivalent 10/13/2016      Health Maintenance:         Topic Date Due    Hepatitis C Screening  Never done         Topic Date Due    DTaP,Tdap,and Td Vaccines (1 - Tdap) Never done    Pneumococcal Vaccine: 65+ Years (1 of 2 - PPSV23) 01/25/2019    COVID-19 Vaccine (3 - Booster for Moderna series) 07/16/2021      Medicare Health Risk Assessment:     /60   Pulse 74   Temp 98 °F (36 7 °C)   Resp 18   Ht 5' 8" (1 727 m)   Wt 97 kg (213 lb 12 8 oz)   SpO2 95%   BMI 32 51 kg/m²      Mikey Patel is here for his Subsequent Wellness visit  Last Medicare Wellness visit information reviewed, patient interviewed and updates made to the record today  Health Risk Assessment:   Patient rates overall health as good  Patient feels that their physical health rating is slightly worse  Patient is very satisfied with their life  Eyesight was rated as slightly worse  Hearing was rated as slightly worse  Patient feels that their emotional and mental health rating is same  Patients states they are never, rarely angry  Patient states they are sometimes unusually tired/fatigued  Pain experienced in the last 7 days has been a lot  Patient's pain rating has been 10/10   Patient states that he has experienced no weight loss or gain in last 6 months  Low back pain, right-sided started about a year ago  Seeing pain management    Depression Screening:   PHQ-9 Score: 0      Fall Risk Screening: In the past year, patient has experienced: no history of falling in past year      Home Safety:  Patient has trouble with stairs inside or outside of their home  Patient has working smoke alarms and has working carbon monoxide detector  Home safety hazards include: none  Nutrition:   Current diet is Regular  Medications:   Patient is currently taking over-the-counter supplements  OTC medications include: see medication list  Patient is able to manage medications  Activities of Daily Living (ADLs)/Instrumental Activities of Daily Living (IADLs):   Walk and transfer into and out of bed and chair?: Yes  Dress and groom yourself?: Yes    Bathe or shower yourself?: Yes    Feed yourself? Yes  Do your laundry/housekeeping?: Yes  Manage your money, pay your bills and track your expenses?: Yes  Make your own meals?: Yes    Do your own shopping?: Yes    Previous Hospitalizations:   Any hospitalizations or ED visits within the last 12 months?: Yes    How many hospitalizations have you had in the last year?: 1-2    Hospitalization Comments: SOB, COPD exacerbations, chest pain    Advance Care Planning:   Living will: Yes    Durable POA for healthcare:  Yes    Advanced directive: Yes    Advanced directive counseling given: Yes    Five wishes given: No    Patient declined ACP directive: No    End of Life Decisions reviewed with patient: Yes    Provider agrees with end of life decisions: Yes      Comments: Daughter is POA    PREVENTIVE SCREENINGS      Cardiovascular Screening:    General: Screening Not Indicated and History Lipid Disorder      Diabetes Screening:     General: Screening Current      Colorectal Cancer Screening:     General: Screening Not Indicated      Prostate Cancer Screening:    General: Risks and Benefits Discussed    Due for: PSA      Osteoporosis Screening:    General: Screening Not Indicated      Abdominal Aortic Aneurysm (AAA) Screening:    Risk factors include: tobacco use        General: Screening Not Indicated      Lung Cancer Screening:     General: Screening Not Indicated      Hepatitis C Screening:    General: Risks and Benefits Discussed    Hep C Screening Accepted: Yes      Screening, Brief Intervention, and Referral to Treatment (SBIRT)    Screening  Typical number of drinks in a day: 0  Typical number of drinks in a week: 0  Interpretation: Low risk drinking behavior  Single Item Drug Screening:  How often have you used an illegal drug (including marijuana) or a prescription medication for non-medical reasons in the past year? never    Single Item Drug Screen Score: 0  Interpretation: Negative screen for possible drug use disorder    Brief Intervention  Alcohol & drug use screenings were reviewed  No concerns regarding substance use disorder identified  Other Counseling Topics:   Car/seat belt/driving safety, skin self-exam, sunscreen and calcium and vitamin D intake and regular weightbearing exercise         Nolberto Rojas MD

## 2022-04-14 NOTE — PATIENT INSTRUCTIONS
Please get blood work done before follow up in June  Get ultrasound of neck to check carotid arteries  Try to avoid eating within 2 hours of bedtime, can also try pepcid   Call if hoarseness or sore throat gets worse      Medicare Preventive Visit Patient Instructions  Thank you for completing your Welcome to Medicare Visit or Medicare Annual Wellness Visit today  Your next wellness visit will be due in one year (4/15/2023)  The screening/preventive services that you may require over the next 5-10 years are detailed below  Some tests may not apply to you based off risk factors and/or age  Screening tests ordered at today's visit but not completed yet may show as past due  Also, please note that scanned in results may not display below  Preventive Screenings:  Service Recommendations Previous Testing/Comments   Colorectal Cancer Screening  · Colonoscopy    · Fecal Occult Blood Test (FOBT)/Fecal Immunochemical Test (FIT)  · Fecal DNA/Cologuard Test  · Flexible Sigmoidoscopy Age: 54-65 years old   Colonoscopy: every 10 years (May be performed more frequently if at higher risk)  OR  FOBT/FIT: every 1 year  OR  Cologuard: every 3 years  OR  Sigmoidoscopy: every 5 years  Screening may be recommended earlier than age 48 if at higher risk for colorectal cancer  Also, an individualized decision between you and your healthcare provider will decide whether screening between the ages of 74-80 would be appropriate   Colonoscopy: Not on file  FOBT/FIT: Not on file  Cologuard: Not on file  Sigmoidoscopy: Not on file          Prostate Cancer Screening Individualized decision between patient and health care provider in men between ages of 53-78   Medicare will cover every 12 months beginning on the day after your 50th birthday PSA: No results in last 5 years     Screening Not Indicated     Hepatitis C Screening Once for adults born between [facility]  More frequently in patients at high risk for Hepatitis C Hep C Antibody: Not on file        Diabetes Screening 1-2 times per year if you're at risk for diabetes or have pre-diabetes Fasting glucose: 105 mg/dL   A1C: 5 7 %    Screening Current   Cholesterol Screening Once every 5 years if you don't have a lipid disorder  May order more often based on risk factors  Lipid panel: 02/22/2021    Screening Not Indicated  History Lipid Disorder      Other Preventive Screenings Covered by Medicare:  1  Abdominal Aortic Aneurysm (AAA) Screening: covered once if your at risk  You're considered to be at risk if you have a family history of AAA or a male between the age of 73-68 who smoking at least 100 cigarettes in your lifetime  2  Lung Cancer Screening: covers low dose CT scan once per year if you meet all of the following conditions: (1) Age 50-69; (2) No signs or symptoms of lung cancer; (3) Current smoker or have quit smoking within the last 15 years; (4) You have a tobacco smoking history of at least 30 pack years (packs per day x number of years you smoked); (5) You get a written order from a healthcare provider  3  Glaucoma Screening: covered annually if you're considered high risk: (1) You have diabetes OR (2) Family history of glaucoma OR (3)  aged 48 and older OR (3)  American aged 72 and older  3  Osteoporosis Screening: covered every 2 years if you meet one of the following conditions: (1) Have a vertebral abnormality; (2) On glucocorticoid therapy for more than 3 months; (3) Have primary hyperparathyroidism; (4) On osteoporosis medications and need to assess response to drug therapy  5  HIV Screening: covered annually if you're between the age of 12-76  Also covered annually if you are younger than 13 and older than 72 with risk factors for HIV infection  For pregnant patients, it is covered up to 3 times per pregnancy      Immunizations:  Immunization Recommendations   Influenza Vaccine Annual influenza vaccination during flu season is recommended for all persons aged >= 6 months who do not have contraindications   Pneumococcal Vaccine (Prevnar and Pneumovax)  * Prevnar = PCV13  * Pneumovax = PPSV23 Adults 25-60 years old: 1-3 doses may be recommended based on certain risk factors  Adults 72 years old: Prevnar (PCV13) vaccine recommended followed by Pneumovax (PPSV23) vaccine  If already received PPSV23 since turning 65, then PCV13 recommended at least one year after PPSV23 dose  Hepatitis B Vaccine 3 dose series if at intermediate or high risk (ex: diabetes, end stage renal disease, liver disease)   Tetanus (Td) Vaccine - COST NOT COVERED BY MEDICARE PART B Following completion of primary series, a booster dose should be given every 10 years to maintain immunity against tetanus  Td may also be given as tetanus wound prophylaxis  Tdap Vaccine - COST NOT COVERED BY MEDICARE PART B Recommended at least once for all adults  For pregnant patients, recommended with each pregnancy  Shingles Vaccine (Shingrix) - COST NOT COVERED BY MEDICARE PART B  2 shot series recommended in those aged 48 and above     Health Maintenance Due:      Topic Date Due    Hepatitis C Screening  Never done     Immunizations Due:      Topic Date Due    DTaP,Tdap,and Td Vaccines (1 - Tdap) Never done    Pneumococcal Vaccine: 65+ Years (1 of 2 - PPSV23) 01/25/2019    COVID-19 Vaccine (3 - Booster for Davene Edinger series) 07/16/2021     Advance Directives   What are advance directives? Advance directives are legal documents that state your wishes and plans for medical care  These plans are made ahead of time in case you lose your ability to make decisions for yourself  Advance directives can apply to any medical decision, such as the treatments you want, and if you want to donate organs  What are the types of advance directives? There are many types of advance directives, and each state has rules about how to use them   You may choose a combination of any of the following:  · Living will: This is a written record of the treatment you want  You can also choose which treatments you do not want, which to limit, and which to stop at a certain time  This includes surgery, medicine, IV fluid, and tube feedings  · Durable power of  for healthcare [facility]): This is a written record that states who you want to make healthcare choices for you when you are unable to make them for yourself  This person, called a proxy, is usually a family member or a friend  You may choose more than 1 proxy  · Do not resuscitate (DNR) order:  A DNR order is used in case your heart stops beating or you stop breathing  It is a request not to have certain forms of treatment, such as CPR  A DNR order may be included in other types of advance directives  · Medical directive: This covers the care that you want if you are in a coma, near death, or unable to make decisions for yourself  You can list the treatments you want for each condition  Treatment may include pain medicine, surgery, blood transfusions, dialysis, IV or tube feedings, and a ventilator (breathing machine)  · Values history: This document has questions about your views, beliefs, and how you feel and think about life  This information can help others choose the care that you would choose  Why are advance directives important? An advance directive helps you control your care  Although spoken wishes may be used, it is better to have your wishes written down  Spoken wishes can be misunderstood, or not followed  Treatments may be given even if you do not want them  An advance directive may make it easier for your family to make difficult choices about your care  Weight Management   Why it is important to manage your weight:  Being overweight increases your risk of health conditions such as heart disease, high blood pressure, type 2 diabetes, and certain types of cancer   It can also increase your risk for osteoarthritis, sleep apnea, and other respiratory problems  Aim for a slow, steady weight loss  Even a small amount of weight loss can lower your risk of health problems  How to lose weight safely:  A safe and healthy way to lose weight is to eat fewer calories and get regular exercise  You can lose up about 1 pound a week by decreasing the number of calories you eat by 500 calories each day  Healthy meal plan for weight management:  A healthy meal plan includes a variety of foods, contains fewer calories, and helps you stay healthy  A healthy meal plan includes the following:  · Eat whole-grain foods more often  A healthy meal plan should contain fiber  Fiber is the part of grains, fruits, and vegetables that is not broken down by your body  Whole-grain foods are healthy and provide extra fiber in your diet  Some examples of whole-grain foods are whole-wheat breads and pastas, oatmeal, brown rice, and bulgur  · Eat a variety of vegetables every day  Include dark, leafy greens such as spinach, kale, herbie greens, and mustard greens  Eat yellow and orange vegetables such as carrots, sweet potatoes, and winter squash  · Eat a variety of fruits every day  Choose fresh or canned fruit (canned in its own juice or light syrup) instead of juice  Fruit juice has very little or no fiber  · Eat low-fat dairy foods  Drink fat-free (skim) milk or 1% milk  Eat fat-free yogurt and low-fat cottage cheese  Try low-fat cheeses such as mozzarella and other reduced-fat cheeses  · Choose meat and other protein foods that are low in fat  Choose beans or other legumes such as split peas or lentils  Choose fish, skinless poultry (chicken or turkey), or lean cuts of red meat (beef or pork)  Before you cook meat or poultry, cut off any visible fat  · Use less fat and oil  Try baking foods instead of frying them  Add less fat, such as margarine, sour cream, regular salad dressing and mayonnaise to foods  Eat fewer high-fat foods   Some examples of high-fat foods include french fries, doughnuts, ice cream, and cakes  · Eat fewer sweets  Limit foods and drinks that are high in sugar  This includes candy, cookies, regular soda, and sweetened drinks  Exercise:  Exercise at least 30 minutes per day on most days of the week  Some examples of exercise include walking, biking, dancing, and swimming  You can also fit in more physical activity by taking the stairs instead of the elevator or parking farther away from stores  Ask your healthcare provider about the best exercise plan for you  © Copyright [employer] 2018 Information is for End User's use only and may not be sold, redistributed or otherwise used for commercial purposes  All illustrations and images included in CareNotes® are the copyrighted property of [employer]  or [facility] Preventive Visit Patient Instructions  Thank you for completing your Welcome to Medicare Visit or Medicare Annual Wellness Visit today  Your next wellness visit will be due in one year (4/15/2023)  The screening/preventive services that you may require over the next 5-10 years are detailed below  Some tests may not apply to you based off risk factors and/or age  Screening tests ordered at today's visit but not completed yet may show as past due  Also, please note that scanned in results may not display below  Preventive Screenings:  Service Recommendations Previous Testing/Comments   Colorectal Cancer Screening  · Colonoscopy    · Fecal Occult Blood Test (FOBT)/Fecal Immunochemical Test (FIT)  · Fecal DNA/Cologuard Test  · Flexible Sigmoidoscopy Age: 54-65 years old   Colonoscopy: every 10 years (May be performed more frequently if at higher risk)  OR  FOBT/FIT: every 1 year  OR  Cologuard: every 3 years  OR  Sigmoidoscopy: every 5 years  Screening may be recommended earlier than age 48 if at higher risk for colorectal cancer   Also, an individualized decision between you and your healthcare provider will decide whether screening between the ages of 74-80 would be appropriate  Colonoscopy: Not on file  FOBT/FIT: Not on file  Cologuard: Not on file  Sigmoidoscopy: Not on file          Prostate Cancer Screening Individualized decision between patient and health care provider in men between ages of 53-78   Medicare will cover every 12 months beginning on the day after your 50th birthday PSA: No results in last 5 years     Screening Not Indicated     Hepatitis C Screening Once for adults born between [facility]  More frequently in patients at high risk for Hepatitis C Hep C Antibody: Not on file        Diabetes Screening 1-2 times per year if you're at risk for diabetes or have pre-diabetes Fasting glucose: 105 mg/dL   A1C: 5 7 %    Screening Current   Cholesterol Screening Once every 5 years if you don't have a lipid disorder  May order more often based on risk factors  Lipid panel: 02/22/2021    Screening Not Indicated  History Lipid Disorder      Other Preventive Screenings Covered by Medicare:  6  Abdominal Aortic Aneurysm (AAA) Screening: covered once if your at risk  You're considered to be at risk if you have a family history of AAA or a male between the age of 73-68 who smoking at least 100 cigarettes in your lifetime  7  Lung Cancer Screening: covers low dose CT scan once per year if you meet all of the following conditions: (1) Age 50-69; (2) No signs or symptoms of lung cancer; (3) Current smoker or have quit smoking within the last 15 years; (4) You have a tobacco smoking history of at least 30 pack years (packs per day x number of years you smoked); (5) You get a written order from a healthcare provider  8  Glaucoma Screening: covered annually if you're considered high risk: (1) You have diabetes OR (2) Family history of glaucoma OR (3)  aged 48 and older OR (3)  American aged 72 and older  5   Osteoporosis Screening: covered every 2 years if you meet one of the following conditions: (1) Have a vertebral abnormality; (2) On glucocorticoid therapy for more than 3 months; (3) Have primary hyperparathyroidism; (4) On osteoporosis medications and need to assess response to drug therapy  10  HIV Screening: covered annually if you're between the age of 12-76  Also covered annually if you are younger than 13 and older than 72 with risk factors for HIV infection  For pregnant patients, it is covered up to 3 times per pregnancy  Immunizations:  Immunization Recommendations   Influenza Vaccine Annual influenza vaccination during flu season is recommended for all persons aged >= 6 months who do not have contraindications   Pneumococcal Vaccine (Prevnar and Pneumovax)  * Prevnar = PCV13  * Pneumovax = PPSV23 Adults 25-60 years old: 1-3 doses may be recommended based on certain risk factors  Adults 72 years old: Prevnar (PCV13) vaccine recommended followed by Pneumovax (PPSV23) vaccine  If already received PPSV23 since turning 65, then PCV13 recommended at least one year after PPSV23 dose  Hepatitis B Vaccine 3 dose series if at intermediate or high risk (ex: diabetes, end stage renal disease, liver disease)   Tetanus (Td) Vaccine - COST NOT COVERED BY MEDICARE PART B Following completion of primary series, a booster dose should be given every 10 years to maintain immunity against tetanus  Td may also be given as tetanus wound prophylaxis  Tdap Vaccine - COST NOT COVERED BY MEDICARE PART B Recommended at least once for all adults  For pregnant patients, recommended with each pregnancy     Shingles Vaccine (Shingrix) - COST NOT COVERED BY MEDICARE PART B  2 shot series recommended in those aged 48 and above     Health Maintenance Due:      Topic Date Due    Hepatitis C Screening  Never done     Immunizations Due:      Topic Date Due    DTaP,Tdap,and Td Vaccines (1 - Tdap) Never done    Pneumococcal Vaccine: 65+ Years (1 of 2 - PPSV23) 01/25/2019    COVID-19 Vaccine (3 - Booster for Moderna series) 07/16/2021 Advance Directives   What are advance directives? Advance directives are legal documents that state your wishes and plans for medical care  These plans are made ahead of time in case you lose your ability to make decisions for yourself  Advance directives can apply to any medical decision, such as the treatments you want, and if you want to donate organs  What are the types of advance directives? There are many types of advance directives, and each state has rules about how to use them  You may choose a combination of any of the following:  · Living will: This is a written record of the treatment you want  You can also choose which treatments you do not want, which to limit, and which to stop at a certain time  This includes surgery, medicine, IV fluid, and tube feedings  · Durable power of  for healthcare [facility]): This is a written record that states who you want to make healthcare choices for you when you are unable to make them for yourself  This person, called a proxy, is usually a family member or a friend  You may choose more than 1 proxy  · Do not resuscitate (DNR) order:  A DNR order is used in case your heart stops beating or you stop breathing  It is a request not to have certain forms of treatment, such as CPR  A DNR order may be included in other types of advance directives  · Medical directive: This covers the care that you want if you are in a coma, near death, or unable to make decisions for yourself  You can list the treatments you want for each condition  Treatment may include pain medicine, surgery, blood transfusions, dialysis, IV or tube feedings, and a ventilator (breathing machine)  · Values history: This document has questions about your views, beliefs, and how you feel and think about life  This information can help others choose the care that you would choose  Why are advance directives important? An advance directive helps you control your care   Although spoken wishes may be used, it is better to have your wishes written down  Spoken wishes can be misunderstood, or not followed  Treatments may be given even if you do not want them  An advance directive may make it easier for your family to make difficult choices about your care  Weight Management   Why it is important to manage your weight:  Being overweight increases your risk of health conditions such as heart disease, high blood pressure, type 2 diabetes, and certain types of cancer  It can also increase your risk for osteoarthritis, sleep apnea, and other respiratory problems  Aim for a slow, steady weight loss  Even a small amount of weight loss can lower your risk of health problems  How to lose weight safely:  A safe and healthy way to lose weight is to eat fewer calories and get regular exercise  You can lose up about 1 pound a week by decreasing the number of calories you eat by 500 calories each day  Healthy meal plan for weight management:  A healthy meal plan includes a variety of foods, contains fewer calories, and helps you stay healthy  A healthy meal plan includes the following:  · Eat whole-grain foods more often  A healthy meal plan should contain fiber  Fiber is the part of grains, fruits, and vegetables that is not broken down by your body  Whole-grain foods are healthy and provide extra fiber in your diet  Some examples of whole-grain foods are whole-wheat breads and pastas, oatmeal, brown rice, and bulgur  · Eat a variety of vegetables every day  Include dark, leafy greens such as spinach, kale, herbie greens, and mustard greens  Eat yellow and orange vegetables such as carrots, sweet potatoes, and winter squash  · Eat a variety of fruits every day  Choose fresh or canned fruit (canned in its own juice or light syrup) instead of juice  Fruit juice has very little or no fiber  · Eat low-fat dairy foods  Drink fat-free (skim) milk or 1% milk  Eat fat-free yogurt and low-fat cottage cheese  Try low-fat cheeses such as mozzarella and other reduced-fat cheeses  · Choose meat and other protein foods that are low in fat  Choose beans or other legumes such as split peas or lentils  Choose fish, skinless poultry (chicken or turkey), or lean cuts of red meat (beef or pork)  Before you cook meat or poultry, cut off any visible fat  · Use less fat and oil  Try baking foods instead of frying them  Add less fat, such as margarine, sour cream, regular salad dressing and mayonnaise to foods  Eat fewer high-fat foods  Some examples of high-fat foods include french fries, doughnuts, ice cream, and cakes  · Eat fewer sweets  Limit foods and drinks that are high in sugar  This includes candy, cookies, regular soda, and sweetened drinks  Exercise:  Exercise at least 30 minutes per day on most days of the week  Some examples of exercise include walking, biking, dancing, and swimming  You can also fit in more physical activity by taking the stairs instead of the elevator or parking farther away from stores  Ask your healthcare provider about the best exercise plan for you  © Copyright [employer] 2018 Information is for End User's use only and may not be sold, redistributed or otherwise used for commercial purposes   All illustrations and images included in CareNotes® are the copyrighted property of [employer]  or [address] [place] [address]

## 2022-04-14 NOTE — PROGRESS NOTES
[facility]        NAME: Ruth Ann Grijalva is a 67 y o  male  : 1945    MRN: [id number]  DATE: April 15, 2022  TIME: 9:52 AM    Assessment and Plan   1  Primary hypertension  - home BP logs reviewed, he was having some lows 90-100s for a few days, recently improved and well-controlled  I advised him to bring home machine to follow up to check it's accuracy  He is taking toprol, mainly for frequent PVCs, and lasix daily      -   CBC and differential; Future  -     Comprehensive metabolic panel; Future    2  Benign prostatic hyperplasia with post-void dribbling  - prostate mildly enlarged on recent CT, will check PSA, discussed tamsulosin if symptoms worsen or become bothersome    3  Mixed simple and mucopurulent chronic bronchitis ([facility] )  - he follows with Pulmonary, taking azithromycin TIW, Advair, Spiriva and prn albuterol, slight wheezing on exam, no evidence of acute exacerbation     -    CBC and differential; Future  -     Comprehensive metabolic panel; Future    4  Coronary artery disease involving native coronary artery of native heart without angina pectoris  -follows with Dr Codie Grimaldo, last cath 2021, has ischemic cardiomyopathy EF 40-45%, he is taking aspirin, crestor, metoprolol for PVCs   -    CBC and differential; Future  -     Comprehensive metabolic panel; Future  -     Lipid Panel with Direct LDL reflex; Future    5  Stage 3a chronic [facility] ([facility] )  -  Renal function stable, GFR 50-60s for past year, recent Cr did increase slightly to 1 3, baseline 1 1-1 2, will repeat BMP before follow up, check urine studies as well  Renal US showed normal kidneys, bilateral simple cysts, otherwise normal     -   CBC and differential; Future  -     Comprehensive metabolic panel; Future  -     Urinalysis with microscopic  -     Microalbumin / creatinine urine ratio  -     Protein / creatinine ratio, urine    6   Need for hepatitis C screening test  -     Hepatitis C antibody; Future    7  Screening for prostate cancer  -     PSA, Total Screen; Future    8  Hyperlipidemia, unspecified hyperlipidemia type  -     Lipid Panel with Direct LDL reflex; Future    9  Medicare annual wellness visit, subsequent    10  Bilateral carotid artery stenosis  - s/p Rt CEA, history of known left occlusion, last carotid duplex Dec 2020 showed occluded left proximal ICA, needs repeat at this time to monitor stability      - VAS carotid complete study; Future; Expected date: 04/14/2022    11  Hoarseness or changing voice  - possibly due to inhaled corticosteroids, some possible dysphagia if he eats quickly, issues with dentition and chewing food  We discussed getting MBS or seeing ENT if symptoms were to progress or worsen  Chief Complaint     Chief Complaint   Patient presents with    Medicare Wellness Visit         History of Present Illness       Here for follow up/AWV  Past medical history remarkable for COPD, CKD stage 3, CAD and carotid stenosis    Former smoker: quit 1995 when he had a heart attack  Previously smoked 35 years, 2ppd  Has 10 stents  Last PCI March 2021, having chest pain at that time  Reports occasional chest pain with over-exertion or not using portable O2  Doesn't feel like his previous MI pain  Occasional takes SL nitro  If it gets bad he will go to ER  Follows with Dr Iraida Roche  Saw EP in January due to palpitations and frequent PVCs  BPH: reports post-void dribbling, occasional nocturia  COPD: follows with Dr Nayan Leblanc and carlos  Gets these from [facility]  Feels more short of breath, changes in voice as well as dysphagia for solid foods, has difficulty chewing due to poor dentition  Denies significant reflux symptoms  Injured his lower back about a year ago, gets spasms, stabbing pain especially when turning/twisting  Also reports weakness in right leg, had trouble walking up a ramp recently  Feels unsteady on his feet  Seeing pain management Monday  Currently taking robaxin prn severe pain  Abdominal discomfort: reviewed recent CT, obtained because he was having bloating, discomfort and frequent belching  Not taking pepcid, hasn't tried this yet  Last colonoscopy a few years ago, does not know if he needs recall due to his age  No family history of colon cancer  Social hx: 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, former smoker, lives with wife and daughter, recently relocated to [place] from [place]  Still driving  Review of Systems   Review of Systems   Constitutional: Positive for fatigue  Negative for activity change, appetite change, chills and fever  HENT: Positive for hearing loss (left ear), postnasal drip, sore throat, trouble swallowing and voice change  Negative for congestion and rhinorrhea  Respiratory: Positive for chest tightness and shortness of breath (due to COPD, at baseline)  Cardiovascular: Positive for palpitations  Negative for chest pain and leg swelling  Gastrointestinal: Positive for abdominal distention and abdominal pain  Negative for constipation, diarrhea, nausea and vomiting  Genitourinary: Positive for difficulty urinating and urgency  Negative for dysuria and frequency  Musculoskeletal: Positive for arthralgias, back pain and gait problem  Neurological: Positive for weakness (right leg) and numbness  Negative for dizziness, light-headedness and headaches  Psychiatric/Behavioral: Positive for sleep disturbance  Negative for confusion, decreased concentration, dysphoric mood, hallucinations and suicidal ideas  The patient is not nervous/anxious and is not hyperactive            Current Medications       Current Outpatient Medications:     albuterol (5 mg/mL) 0 5 % nebulizer solution, 2 5 mg every 6 (six) hours as needed , Disp: , Rfl:     Ascorbic Acid (vitamin C) 1000 MG tablet, Take 1,000 mg by mouth daily, Disp: , Rfl:     aspirin 81 mg chewable tablet, CHEW ONE TABLET BY MOUTH EVERY DAY, Disp: , Rfl:    azithromycin (ZITHROMAX) 250 mg tablet, Take 1 every Monday, Wednesday and Friday , Disp: 45 tablet, Rfl: 3    Blood Pressure Monitor KIT, Use daily, Disp: 1 kit, Rfl: 0    fluticasone (FLONASE) 50 mcg/act nasal spray, into each nostril as needed , Disp: , Rfl:     fluticasone-salmeterol (Wixela Inhub) 500-50 mcg/dose inhaler, Inhale 1 puff in the morning Rinse mouth after use , Disp: , Rfl:     furosemide (LASIX) 40 mg tablet, Take 1 tablet (40 mg total) by mouth daily, Disp: 100 tablet, Rfl: 3    methocarbamol (ROBAXIN) 500 mg tablet, Take 1 tablet (500 mg total) by mouth 3 (three) times a day as needed for muscle spasms, Disp: 30 tablet, Rfl: 0    metoprolol succinate (TOPROL-XL) 25 mg 24 hr tablet, Take 1 tablet (25 mg total) by mouth daily, Disp: 30 tablet, Rfl: 3    metoprolol succinate (TOPROL-XL) 50 mg 24 hr tablet, Take 1 tablet (50 mg total) by mouth 2 (two) times a day, Disp: 180 tablet, Rfl: 3    Multiple Vitamin (multivitamin) tablet, Take 1 tablet by mouth daily, Disp: , Rfl:     nitroglycerin (NITROSTAT) 0 4 mg SL tablet, DISSOLVE ONE TABLET UNDER THE TONGUE  PRN, Disp: , Rfl:     potassium chloride (Klor-Con) 10 mEq tablet, Take 2 tablets (20 mEq total) by mouth daily, Disp: 200 tablet, Rfl: 3    rosuvastatin (CRESTOR) 40 MG tablet, Take 1 tablet (40 mg total) by mouth daily, Disp: 100 tablet, Rfl: 3    Tiotropium Bromide Monohydrate (SPIRIVA RESPIMAT IN), Inhale, Disp: , Rfl:     famotidine (PEPCID) 20 mg tablet, Take 1 tablet (20 mg total) by mouth daily (Patient not taking: Reported on 4/14/2022 ), Disp: 100 tablet, Rfl: 3    Current Allergies     Allergies as of 04/14/2022 - Reviewed 04/14/2022   Allergen Reaction Noted    Lisinopril Swelling and Cough 01/18/2008    Tetanus antitoxin Anaphylaxis 06/09/2005    Tetanus toxoid Anaphylaxis and Swelling 01/16/2007            The following portions of the patient's history were reviewed and updated as appropriate: allergies, current medications, past family history, past medical history, past social history, past surgical history and problem list      Past Medical History:   Diagnosis Date    Bilateral carotid artery stenosis     Chronic diastolic heart failure (HCC)     Chronic ischemic heart disease     [facility]     Coronary artery disease     hx stents, MI, PCI    Hearing loss     Hyperlipidemia     Hypertension     [facility] (my[facility] infarction) [facility] ) 1995    S/P carotid endarterectomy     Sleep apnea     Sleep apnea, obstructive     Stented coronary artery        Past Surgical History:   Procedure Laterality Date    CARDIAC CATHETERIZATION  03/18/2021    left main with no significant disease, proximal LAD with 10% stenosis at the site of prior stent, left circumflex artery with minimal luminal irregularities mid RCA with 50% stenosis at site of prior stent and PL segment with distal disease supplied by collaterals from the distal circumflex with no significant CAD requiring revascularization at that time   COLONOSCOPY      CORONARY ANGIOPLASTY WITH STENT PLACEMENT  1999    RCA    CORONARY ANGIOPLASTY WITH STENT PLACEMENT  2001    RCA    CORONARY ANGIOPLASTY WITH STENT PLACEMENT  2003    LAD    EYE SURGERY      SKIN CANCER EXCISION      arm       Family History   Problem Relation Age of Onset    Heart attack Mother     Dementia Mother          Medications have been verified  Objective   /60   Pulse 74   Temp 98 °F (36 7 °C)   Resp 18   Ht 5' 8" (1 727 m)   Wt 97 kg (213 lb 12 8 oz)   SpO2 95%   BMI 32 51 kg/m²        Physical Exam     Physical Exam  Vitals reviewed  Constitutional:       General: He is not in acute distress  Appearance: He is obese  HENT:      Head: Normocephalic and atraumatic  Right Ear: Hearing, tympanic membrane, ear canal and external ear normal       Left Ear: Ear canal and external ear normal  Decreased hearing noted  Tympanic membrane is scarred  Mouth/Throat:      Pharynx: No oropharyngeal exudate or posterior oropharyngeal erythema  Eyes:      General: No scleral icterus  Conjunctiva/sclera: Conjunctivae normal    Neck:      Thyroid: No thyroid tenderness  Vascular: No carotid bruit  Cardiovascular:      Rate and Rhythm: Normal rate and regular rhythm  Pulses: Normal pulses  Heart sounds: Heart sounds are distant  No murmur heard  No friction rub  No gallop  Pulmonary:      Effort: Pulmonary effort is normal  Prolonged expiration present  No accessory muscle usage  Breath sounds: Examination of the right-upper field reveals wheezing  Examination of the left-upper field reveals wheezing  Examination of the right-lower field reveals decreased breath sounds  Examination of the left-lower field reveals decreased breath sounds  Decreased breath sounds and wheezing present  No rhonchi or rales  Chest:      Chest wall: Deformity (barrel chest) present  Abdominal:      General: Abdomen is flat  Bowel sounds are increased  Palpations: Abdomen is soft  There is no hepatomegaly or mass  Tenderness: There is no abdominal tenderness  There is no guarding or rebound  Hernia: A hernia is present  Hernia is present in the ventral area  Lymphadenopathy:      Cervical: No cervical adenopathy  Neurological:      Mental Status: He is alert  Motor: Motor function is intact  No tremor  Coordination: Romberg sign negative  Gait: Gait is intact  Psychiatric:         Mood and Affect: Mood and affect normal          Speech: Speech normal          Behavior: Behavior normal  Behavior is cooperative           Cognition and Memory: Cognition and memory normal              Results:  Lab Results   Component Value Date    SODIUM 141 02/28/2022    K 3 8 02/28/2022     02/28/2022    CO2 34 (H) 02/28/2022    BUN 20 02/28/2022    CREATININE 1 30 02/28/2022    GLUC 93 02/28/2022 CALCIUM 9 3 02/28/2022       Lab Results   Component Value Date    HGBA1C 5 7 (H) 07/20/2021       Lab Results   Component Value Date    WBC 9 50 02/22/2021    HGB 14 4 02/22/2021    HCT 42 9 02/22/2021    MCV 96 02/22/2021     02/22/2021

## 2022-04-15 ENCOUNTER — HOSPITAL ENCOUNTER (OUTPATIENT)
Dept: NON INVASIVE DIAGNOSTICS | Facility: HOSPITAL | Age: 77
Discharge: HOME | End: 2022-04-15
Attending: INTERNAL MEDICINE
Payer: COMMERCIAL

## 2022-04-15 DIAGNOSIS — I65.23 BILATERAL CAROTID ARTERY STENOSIS: ICD-10-CM

## 2022-04-15 PROCEDURE — 93880 EXTRACRANIAL BILAT STUDY: CPT

## 2022-04-15 PROCEDURE — 93880 EXTRACRANIAL BILAT STUDY: CPT | Performed by: SURGERY

## 2022-04-18 ENCOUNTER — APPOINTMENT (OUTPATIENT)
Dept: LAB | Facility: CLINIC | Age: 77
End: 2022-04-18
Payer: COMMERCIAL

## 2022-04-18 ENCOUNTER — CONSULT (OUTPATIENT)
Dept: PAIN MEDICINE | Facility: CLINIC | Age: 77
End: 2022-04-18
Payer: COMMERCIAL

## 2022-04-18 ENCOUNTER — TELEPHONE (OUTPATIENT)
Dept: FAMILY MEDICINE CLINIC | Facility: CLINIC | Age: 77
End: 2022-04-18

## 2022-04-18 ENCOUNTER — APPOINTMENT (OUTPATIENT)
Dept: RADIOLOGY | Facility: CLINIC | Age: 77
End: 2022-04-18
Payer: COMMERCIAL

## 2022-04-18 VITALS
TEMPERATURE: 97.4 F | HEIGHT: 68 IN | OXYGEN SATURATION: 79 % | HEART RATE: 97 BPM | SYSTOLIC BLOOD PRESSURE: 118 MMHG | WEIGHT: 211.6 LBS | BODY MASS INDEX: 32.07 KG/M2 | DIASTOLIC BLOOD PRESSURE: 50 MMHG

## 2022-04-18 DIAGNOSIS — I25.10 CORONARY ARTERY DISEASE INVOLVING NATIVE CORONARY ARTERY OF NATIVE HEART WITHOUT ANGINA PECTORIS: ICD-10-CM

## 2022-04-18 DIAGNOSIS — J44.1 COPD EXACERBATION (HCC): Primary | ICD-10-CM

## 2022-04-18 DIAGNOSIS — M54.9 MID BACK PAIN: ICD-10-CM

## 2022-04-18 DIAGNOSIS — Z11.59 NEED FOR HEPATITIS C SCREENING TEST: ICD-10-CM

## 2022-04-18 DIAGNOSIS — G89.29 CHRONIC BILATERAL LOW BACK PAIN WITH RIGHT-SIDED SCIATICA: ICD-10-CM

## 2022-04-18 DIAGNOSIS — G89.4 CHRONIC PAIN SYNDROME: ICD-10-CM

## 2022-04-18 DIAGNOSIS — M54.14 THORACIC RADICULOPATHY: ICD-10-CM

## 2022-04-18 DIAGNOSIS — E78.5 HYPERLIPIDEMIA, UNSPECIFIED HYPERLIPIDEMIA TYPE: ICD-10-CM

## 2022-04-18 DIAGNOSIS — Z12.5 SCREENING FOR PROSTATE CANCER: ICD-10-CM

## 2022-04-18 DIAGNOSIS — M54.16 LUMBAR RADICULOPATHY: Primary | ICD-10-CM

## 2022-04-18 DIAGNOSIS — I10 PRIMARY HYPERTENSION: ICD-10-CM

## 2022-04-18 DIAGNOSIS — M54.41 CHRONIC BILATERAL LOW BACK PAIN WITH RIGHT-SIDED SCIATICA: ICD-10-CM

## 2022-04-18 DIAGNOSIS — J41.8 MIXED SIMPLE AND MUCOPURULENT CHRONIC BRONCHITIS (HCC): ICD-10-CM

## 2022-04-18 DIAGNOSIS — M54.16 LUMBAR RADICULOPATHY: ICD-10-CM

## 2022-04-18 DIAGNOSIS — N18.31 STAGE 3A CHRONIC KIDNEY DISEASE (HCC): ICD-10-CM

## 2022-04-18 LAB
ALBUMIN SERPL BCP-MCNC: 3.4 G/DL (ref 3.5–5)
ALP SERPL-CCNC: 80 U/L (ref 46–116)
ALT SERPL W P-5'-P-CCNC: 26 U/L (ref 12–78)
ANION GAP SERPL CALCULATED.3IONS-SCNC: 4 MMOL/L (ref 4–13)
AST SERPL W P-5'-P-CCNC: 22 U/L (ref 5–45)
BACTERIA UR QL AUTO: ABNORMAL /HPF
BASOPHILS # BLD AUTO: 0.08 THOUSANDS/ΜL (ref 0–0.1)
BASOPHILS NFR BLD AUTO: 1 % (ref 0–1)
BILIRUB SERPL-MCNC: 0.98 MG/DL (ref 0.2–1)
BILIRUB UR QL STRIP: NEGATIVE
BUN SERPL-MCNC: 17 MG/DL (ref 5–25)
CALCIUM ALBUM COR SERPL-MCNC: 9.4 MG/DL (ref 8.3–10.1)
CALCIUM SERPL-MCNC: 8.9 MG/DL (ref 8.3–10.1)
CHLORIDE SERPL-SCNC: 104 MMOL/L (ref 100–108)
CHOLEST SERPL-MCNC: 110 MG/DL
CLARITY UR: CLEAR
CO2 SERPL-SCNC: 33 MMOL/L (ref 21–32)
COLOR UR: ABNORMAL
CREAT SERPL-MCNC: 1.3 MG/DL (ref 0.6–1.3)
CREAT UR-MCNC: 67.5 MG/DL
CREAT UR-MCNC: 67.5 MG/DL
EOSINOPHIL # BLD AUTO: 0.37 THOUSAND/ΜL (ref 0–0.61)
EOSINOPHIL NFR BLD AUTO: 3 % (ref 0–6)
ERYTHROCYTE [DISTWIDTH] IN BLOOD BY AUTOMATED COUNT: 14.6 % (ref 11.6–15.1)
GFR SERPL CREATININE-BSD FRML MDRD: 52 ML/MIN/1.73SQ M
GLUCOSE P FAST SERPL-MCNC: 97 MG/DL (ref 65–99)
GLUCOSE UR STRIP-MCNC: NEGATIVE MG/DL
HCT VFR BLD AUTO: 48.4 % (ref 36.5–49.3)
HCV AB SER QL: NORMAL
HDLC SERPL-MCNC: 53 MG/DL
HGB BLD-MCNC: 16.3 G/DL (ref 12–17)
HGB UR QL STRIP.AUTO: NEGATIVE
HYALINE CASTS #/AREA URNS LPF: ABNORMAL /LPF
IMM GRANULOCYTES # BLD AUTO: 0.07 THOUSAND/UL (ref 0–0.2)
IMM GRANULOCYTES NFR BLD AUTO: 1 % (ref 0–2)
KETONES UR STRIP-MCNC: NEGATIVE MG/DL
LDLC SERPL CALC-MCNC: 45 MG/DL (ref 0–100)
LEUKOCYTE ESTERASE UR QL STRIP: NEGATIVE
LYMPHOCYTES # BLD AUTO: 0.9 THOUSANDS/ΜL (ref 0.6–4.47)
LYMPHOCYTES NFR BLD AUTO: 8 % (ref 14–44)
MCH RBC QN AUTO: 31.8 PG (ref 26.8–34.3)
MCHC RBC AUTO-ENTMCNC: 33.7 G/DL (ref 31.4–37.4)
MCV RBC AUTO: 94 FL (ref 82–98)
MICROALBUMIN UR-MCNC: 67.4 MG/L (ref 0–20)
MICROALBUMIN/CREAT 24H UR: 100 MG/G CREATININE (ref 0–30)
MONOCYTES # BLD AUTO: 1.56 THOUSAND/ΜL (ref 0.17–1.22)
MONOCYTES NFR BLD AUTO: 14 % (ref 4–12)
NEUTROPHILS # BLD AUTO: 8.33 THOUSANDS/ΜL (ref 1.85–7.62)
NEUTS SEG NFR BLD AUTO: 73 % (ref 43–75)
NITRITE UR QL STRIP: NEGATIVE
NON-SQ EPI CELLS URNS QL MICRO: ABNORMAL /HPF
NRBC BLD AUTO-RTO: 0 /100 WBCS
PH UR STRIP.AUTO: 6 [PH]
PLATELET # BLD AUTO: 214 THOUSANDS/UL (ref 149–390)
PMV BLD AUTO: 10.6 FL (ref 8.9–12.7)
POTASSIUM SERPL-SCNC: 3.8 MMOL/L (ref 3.5–5.3)
PROT SERPL-MCNC: 6.5 G/DL (ref 6.4–8.2)
PROT UR STRIP-MCNC: ABNORMAL MG/DL
PROT UR-MCNC: 26 MG/DL
PROT/CREAT UR: 0.39 MG/G{CREAT} (ref 0–0.1)
PSA SERPL-MCNC: 9.1 NG/ML (ref 0–4)
RBC # BLD AUTO: 5.13 MILLION/UL (ref 3.88–5.62)
RBC #/AREA URNS AUTO: ABNORMAL /HPF
SODIUM SERPL-SCNC: 141 MMOL/L (ref 136–145)
SP GR UR STRIP.AUTO: 1.01 (ref 1–1.03)
TRIGL SERPL-MCNC: 62 MG/DL
UROBILINOGEN UR STRIP-ACNC: <2 MG/DL
WBC # BLD AUTO: 11.31 THOUSAND/UL (ref 4.31–10.16)
WBC #/AREA URNS AUTO: ABNORMAL /HPF

## 2022-04-18 PROCEDURE — 80053 COMPREHEN METABOLIC PANEL: CPT

## 2022-04-18 PROCEDURE — 82570 ASSAY OF URINE CREATININE: CPT | Performed by: INTERNAL MEDICINE

## 2022-04-18 PROCEDURE — 85025 COMPLETE CBC W/AUTO DIFF WBC: CPT

## 2022-04-18 PROCEDURE — 72110 X-RAY EXAM L-2 SPINE 4/>VWS: CPT

## 2022-04-18 PROCEDURE — 36415 COLL VENOUS BLD VENIPUNCTURE: CPT

## 2022-04-18 PROCEDURE — 86803 HEPATITIS C AB TEST: CPT

## 2022-04-18 PROCEDURE — G0103 PSA SCREENING: HCPCS

## 2022-04-18 PROCEDURE — 80061 LIPID PANEL: CPT

## 2022-04-18 PROCEDURE — 84156 ASSAY OF PROTEIN URINE: CPT | Performed by: INTERNAL MEDICINE

## 2022-04-18 PROCEDURE — 82043 UR ALBUMIN QUANTITATIVE: CPT | Performed by: INTERNAL MEDICINE

## 2022-04-18 PROCEDURE — 81001 URINALYSIS AUTO W/SCOPE: CPT | Performed by: INTERNAL MEDICINE

## 2022-04-18 PROCEDURE — 72072 X-RAY EXAM THORAC SPINE 3VWS: CPT

## 2022-04-18 PROCEDURE — 99204 OFFICE O/P NEW MOD 45 MIN: CPT | Performed by: ANESTHESIOLOGY

## 2022-04-18 RX ORDER — PREDNISONE 20 MG/1
40 TABLET ORAL DAILY
Qty: 10 TABLET | Refills: 0 | Status: SHIPPED | OUTPATIENT
Start: 2022-04-18 | End: 2022-04-23

## 2022-04-18 NOTE — TELEPHONE ENCOUNTER
Ok, prednisone sent to [facility]  Recommend nebulizers at least twice daily   If no improvement or worsening over next few days, call PCP or Dr Flakita Garcia

## 2022-04-18 NOTE — PROGRESS NOTES
Assessment:  1  Lumbar radiculopathy    2  Chronic bilateral low back pain with right-sided sciatica    3  Mid back pain    4  Thoracic radiculopathy    5  Chronic pain syndrome        Plan:  Patient is a 58-year-old male with complaints of midback pain, low back pain, right flank pain and right leg pain with chronic pain syndrome secondary to lumbar spondylosis presents office for initial consultation  Patient does report radiculopathy in the right thoracolumbar region radiating around into his abdomen  Patient reports feeling a sensitivity to light touch in addition to heart masses in cramping sensation  Patient also has similar sensation to the left leg which wraps around and into his knee  Patient reports the pain comes and go put when it hits is like a ton of bricks  Patient reports a constant dull/aching pressure in his mid back and low back  1  We will order x-ray of the lumbar and thoracic spine to better assess the degenerative changes that correlate patient's current presentation  2  We will order an MRI of the thoracic lumbar spine assess the for the discogenic pathology behind patient's thoracic and lumbar radicular symptoms  3  Follow-up in 1 month to review diagnostic imaging plan interventional management        History of Present Illness: The patient is a 67 y o  male who presents for consultation in regards to Hip Pain and Back Pain  Symptoms have been present for 3 years  Symptoms began without any precipitating injury or trauma  Pain is reported to be 9 on the numeric rating scale  Symptoms are felt intermittently and worst in the nighttime  Symptoms are characterized as shooting and sharp  Symptoms are associated with no weakness  Aggravating factors include lying down, bending, leaning forward, leaning bckward, sitting, walking, exercise and coughing/sneezing  Relieving factors include nothing    No change in symptoms with kneeling, standing, turning the head, relaxation and bowel movements  Treatments that have been helpful include TENS unit and heat/ice  nothing have provided no relief  Medications to relieve symptoms include none  Review of Systems:    Review of Systems   Constitutional: Negative for fever and unexpected weight change  HENT: Positive for hearing loss and nosebleeds  Negative for trouble swallowing  Eyes: Positive for visual disturbance  Respiratory: Positive for cough, shortness of breath and wheezing  Cardiovascular: Positive for palpitations  Negative for chest pain  Gastrointestinal: Negative for constipation, diarrhea, nausea and vomiting  Endocrine: Positive for polydipsia and polyuria  Negative for cold intolerance and heat intolerance  Genitourinary: Negative for difficulty urinating and frequency  Musculoskeletal: Positive for arthralgias and myalgias  Negative for gait problem and joint swelling  Skin: Negative for rash  Neurological: Negative for dizziness, seizures, syncope, weakness and headaches  Hematological: Does not bruise/bleed easily  Psychiatric/Behavioral: Negative for dysphoric mood  All other systems reviewed and are negative          Past Medical History:   Diagnosis Date    Bilateral carotid artery stenosis     Chronic diastolic heart failure (HCC)     Chronic ischemic heart disease     COPD (chronic obstructive pulmonary disease) (HCC)     Coronary artery disease     hx stents, MI, PCI    Hearing loss     Hyperlipidemia     Hypertension     [facility]    S/P carotid endarterectomy     Sleep apnea     Sleep apnea, obstructive     Stented coronary artery        Past Surgical History:   Procedure Laterality Date    CARDIAC CATHETERIZATION  03/18/2021    left main with no significant disease, proximal LAD with 10% stenosis at the site of prior stent, left circumflex artery with minimal luminal irregularities mid RCA with 50% stenosis at site of prior stent and PL segment with distal disease supplied by collaterals from the distal circumflex with no significant CAD requiring revascularization at that time      COLONOSCOPY      CORONARY ANGIOPLASTY WITH STENT PLACEMENT      RCA    CORONARY ANGIOPLASTY WITH STENT PLACEMENT      RCA    CORONARY ANGIOPLASTY WITH STENT PLACEMENT      LAD    EYE SURGERY      SKIN CANCER EXCISION      arm       Family History   Problem Relation Age of Onset    Heart attack Mother     Dementia Mother        Social History     Occupational History    Not on file   Tobacco Use    Smoking status: Former Smoker     Quit date:      Years since quittin 3    Smokeless tobacco: Never Used   Vaping Use    Vaping Use: Never used   Substance and Sexual Activity    Alcohol use: Not Currently    Drug use: Never    Sexual activity: Not on file         Current Outpatient Medications:     albuterol (5 mg/mL) 0 5 % nebulizer solution, 2 5 mg every 6 (six) hours as needed , Disp: , Rfl:     Ascorbic Acid (vitamin C) 1000 MG tablet, Take 1,000 mg by mouth daily, Disp: , Rfl:     aspirin 81 mg chewable tablet, CHEW ONE TABLET BY MOUTH EVERY DAY, Disp: , Rfl:     azithromycin (ZITHROMAX) 250 mg tablet, Take 1 every Monday, Wednesday and Friday , Disp: 45 tablet, Rfl: 3    Blood Pressure Monitor KIT, Use daily, Disp: 1 kit, Rfl: 0    famotidine (PEPCID) 20 mg tablet, Take 1 tablet (20 mg total) by mouth daily (Patient not taking: Reported on 2022 ), Disp: 100 tablet, Rfl: 3    fluticasone (FLONASE) 50 mcg/act nasal spray, into each nostril as needed , Disp: , Rfl:     fluticasone-salmeterol (Wixela Inhub) 500-50 mcg/dose inhaler, Inhale 1 puff in the morning Rinse mouth after use , Disp: , Rfl:     furosemide (LASIX) 40 mg tablet, Take 1 tablet (40 mg total) by mouth daily, Disp: 100 tablet, Rfl: 3    methocarbamol (ROBAXIN) 500 mg tablet, Take 1 tablet (500 mg total) by mouth 3 (three) times a day as needed for muscle spasms, Disp: 30 tablet, Rfl: 0    metoprolol succinate (TOPROL-XL) 25 mg 24 hr tablet, Take 1 tablet (25 mg total) by mouth daily, Disp: 30 tablet, Rfl: 3    metoprolol succinate (TOPROL-XL) 50 mg 24 hr tablet, Take 1 tablet (50 mg total) by mouth 2 (two) times a day, Disp: 180 tablet, Rfl: 3    Multiple Vitamin (multivitamin) tablet, Take 1 tablet by mouth daily, Disp: , Rfl:     nitroglycerin (NITROSTAT) 0 4 mg SL tablet, DISSOLVE ONE TABLET UNDER THE TONGUE  PRN, Disp: , Rfl:     potassium chloride (Klor-Con) 10 mEq tablet, Take 2 tablets (20 mEq total) by mouth daily, Disp: 200 tablet, Rfl: 3    rosuvastatin (CRESTOR) 40 MG tablet, Take 1 tablet (40 mg total) by mouth daily, Disp: 100 tablet, Rfl: 3    Tiotropium Bromide Monohydrate (SPIRIVA RESPIMAT IN), Inhale, Disp: , Rfl:     Allergies   Allergen Reactions    Lisinopril Swelling and Cough    Tetanus Antitoxin Anaphylaxis    Tetanus Toxoid Anaphylaxis and Swelling       Physical Exam:    /50   Pulse 97   Temp (!) 97 4 °F (36 3 °C)   Ht 5' 8" (1 727 m)   Wt 96 kg (211 lb 9 6 oz)   SpO2 (!) 79%   BMI 32 17 kg/m²     Constitutional: normal, well developed, well nourished, alert, in no distress and non-toxic and no overt pain behavior  and overweight  Eyes: anicteric  HEENT: grossly intact  Neck: supple, symmetric, trachea midline and no masses   Pulmonary:even and unlabored  Cardiovascular:No edema or pitting edema present  Skin:Normal without rashes or lesions and well hydrated  Psychiatric:Mood and affect appropriate  Neurologic:Cranial Nerves II-XII grossly intact  Musculoskeletal:normal and antalgic     Lumbar/Sacral Spine examination demonstrates  Decreased range of motion lumbar spine with pain upon: flexion, lateral rotation to the left/right, and bending to the left/right  Bilateral lumbar paraspinals tender to palpation  Muscle spasms noted in the lumbar area bilaterally   4/5 right lower extremity strength in all muscle groups bilaterally  Positive seated straight leg raise for bilateral lower extremities  Sensitivity to light touch intact bilateral lower extremities  2+ reflexes in the patella and Achilles    No ankle clonus     Imaging  RIGHT HIP     INDICATION:   [id number] [id number]: Pain in right hip      COMPARISON:  None     VIEWS:  XR HIP/PELV 2-3 VWS RIGHT W PELVIS IF PERFORMED         FINDINGS:     There is no acute fracture or dislocation      Bilateral mild degenerative hip joints     No lytic or blastic osseous lesion      Soft tissues are unremarkable      Degenerative lower lumbar spine and pubic symphysis      IMPRESSION:     No acute osseous abnormality

## 2022-04-18 NOTE — TELEPHONE ENCOUNTER
Spoke with patient  She said he does not feel like it is a flare up yet  He said it starts as a cold, goes to bronchitis, then pneumonia and COPD flare up and in the hospital so he would like to prevent that  He is okay with the steroid  He does have a nebulizer at home and already started albuterol treatments

## 2022-04-18 NOTE — PATIENT INSTRUCTIONS
Core Strengthening Exercises   WHAT YOU NEED TO KNOW:   What do I need to know about core strengthening exercises? Core strengthening exercises help heal and strengthen muscles of your hips, back, and abdomen to prevent reinjury  They are beginning exercises to help support your spine  Ask your healthcare provider if you need to see a physical therapist for more advanced exercises  · Do the exercises on a mat or firm surface  (not on a bed) to support your spine and avoid low back pain  · Do the exercises in the same order every time  to train your muscles to work together  Your healthcare provider will show you how to perform these exercises  Do them every day, or as directed by your healthcare provider  · Move slowly and smoothly  Avoid fast or jerky motions  · Stop if you feel pain  It is normal to feel some discomfort at first  Regular exercise will help decrease your discomfort over time  How do I perform core strengthening exercises safely? Hold each exercise for 5 seconds  When you can do the exercise without pain for 5 seconds, increase your hold to 10 to 15 seconds  When you can do the exercise without pain for 10 to 15 seconds, add the next exercise  Increase the time you hold each exercise, or repeat the exercises as directed  As you do each exercise, breathe normally  Do not hold your breath  · Abdominal bracing:  Lie on your back with your knees bent and feet flat on the floor  Place your arms in a relaxed position beside your body  Pull your belly button in toward your spine  Do not flatten or arch your back  Tighten the abdominal muscles below your belly button  Hold for 5 seconds  Begin all of your exercises with abdominal bracing  You can also practice abdominal bracing throughout the day while you are sitting or standing  · Bridging:  Lie on your back with your knees bent and feet flat on the floor  Rest your arms at your side   Tighten your buttocks, and then lift your hips 1 inch off the floor  Hold for 5 seconds  When you can do this exercise without pain for 10 seconds, increase the distance you lift your hips  A good goal is to be able to lift your hips so that your shoulders, hips, and knees are in a straight line  · Curl up:  Lie on your back with your knees bent and feet flat on the floor  Place your hands, palms down, underneath the curve in your lower back  Next, with your elbows on the floor, lift your shoulders and chest 2 to 3 inches  Keep your head in line with your shoulders  Hold this position for 5 seconds  When you can do this exercise without pain for 10 to 15 seconds, you may add a rotation  While your shoulders and chest are lifted off the ground, turn slightly to the left and hold  Repeat on the other side  · Dead bug:  Lie on your back with your knees bent and feet flat on the floor  Place your arms in a relaxed position beside your body  Begin with abdominal bracing  Next, raise one leg, keeping your knee bent  Hold for 5 seconds  Repeat with the other leg  When you can do this exercise without pain for 10 to 15 seconds, you may raise one straight leg and hold  Repeat with the other leg  · Quadruped:  Place your hands and knees on the floor  Keep your wrists directly below your shoulders and your knees directly below your hips  Pull your belly button in toward your spine  Do not flatten or arch your back  Tighten your abdominal muscles below your belly button  Hold for 5 seconds  When you can do this exercise without pain for 10 to 15 seconds, you may extend one arm and hold  Repeat on the other side  · Side Bridge:      ¨ Standing side bridge:  Stand next to a wall and extend one arm toward the wall  Place your palm flat on the wall with your fingers pointing upward  Begin with abdominal bracing  Next, without moving your feet, slowly bend your arm to 90 degrees  Hold for 5 seconds  Repeat on the other side   When you can do this exercise without pain for 10 to 15 seconds, you may do the bent leg side bridge on the floor  ¨ Bent leg side bridge:  Lie on one side with your legs, hips, and shoulders in a straight line  Prop yourself up onto your forearm so your elbow is directly below your shoulder  Bend your knees back to 90 degrees  Begin with abdominal bracing  Next, lift your hips and balance yourself on your forearm and knees  Hold for 5 seconds  Repeat on the other side  When you can do this exercise without pain for 10 to 15 seconds, you may do the straight leg side bridge on the floor  ¨ Straight leg side bridge:  Lie on one side with your legs, hips, and shoulders in a straight line  Prop yourself up onto your forearm so your elbow is directly below your shoulder  Begin with abdominal bracing  Lift your hips off the floor and balance yourself on your forearm and the outside of your flexed foot  Do not let your ankle bend sideways  Hold for 5 seconds  Repeat on the other side  When you can do this exercise without pain for 10 to 15 seconds, ask your healthcare provider for more advanced exercises  When should I contact my healthcare provider? · Your pain becomes worse  · You have new pain  · You have questions or concerns about your condition, care, or exercise program   CARE AGREEMENT:   You have the right to help plan your care  Learn about your health condition and how it may be treated  Discuss treatment options with your caregivers to decide what care you want to receive  You always have the right to refuse treatment  The above information is an  only  It is not intended as medical advice for individual conditions or treatments  Talk to your doctor, nurse or pharmacist before following any medical regimen to see if it is safe and effective for you    © 2016 [address] [employer] is for End User's use only and may not be sold, redistributed or otherwise used for commercial purposes  All illustrations and images included in CareNotes® are the copyrighted property of A D A M , Inc  or Luther Cortez

## 2022-04-18 NOTE — TELEPHONE ENCOUNTER
[id number] Becky Cevallos, does he feel like he's getting a flare of COPD? I would recommend steroids, need to be cautious with antibiotics because of his heart rhythm  Does he have a nebulizer at home?

## 2022-04-19 DIAGNOSIS — R80.9 PROTEINURIA, UNSPECIFIED TYPE: ICD-10-CM

## 2022-04-19 DIAGNOSIS — R97.20 ELEVATED PSA: Primary | ICD-10-CM

## 2022-04-25 DIAGNOSIS — J44.1 COPD EXACERBATION (HCC): Primary | ICD-10-CM

## 2022-04-25 NOTE — TELEPHONE ENCOUNTER
Patient requesting refill(s) of:   Albuterol 5 mg/mL  Last filled:8/24/20  Last appt:4/14/22  Next appt:6/2/22  Pharmacy:[facility], [address]

## 2022-04-27 ENCOUNTER — APPOINTMENT (OUTPATIENT)
Dept: LAB | Facility: CLINIC | Age: 77
End: 2022-04-27
Payer: COMMERCIAL

## 2022-04-27 ENCOUNTER — TELEPHONE (OUTPATIENT)
Dept: FAMILY MEDICINE CLINIC | Facility: CLINIC | Age: 77
End: 2022-04-27

## 2022-04-27 ENCOUNTER — OFFICE VISIT (OUTPATIENT)
Dept: FAMILY MEDICINE CLINIC | Facility: CLINIC | Age: 77
End: 2022-04-27
Payer: COMMERCIAL

## 2022-04-27 ENCOUNTER — APPOINTMENT (OUTPATIENT)
Dept: RADIOLOGY | Facility: CLINIC | Age: 77
End: 2022-04-27
Payer: COMMERCIAL

## 2022-04-27 VITALS
RESPIRATION RATE: 20 BRPM | SYSTOLIC BLOOD PRESSURE: 122 MMHG | WEIGHT: 209 LBS | OXYGEN SATURATION: 84 % | HEART RATE: 89 BPM | HEIGHT: 68 IN | TEMPERATURE: 96.7 F | BODY MASS INDEX: 31.67 KG/M2 | DIASTOLIC BLOOD PRESSURE: 58 MMHG

## 2022-04-27 DIAGNOSIS — R33.9 INCOMPLETE BLADDER EMPTYING: ICD-10-CM

## 2022-04-27 DIAGNOSIS — R35.0 URINARY FREQUENCY: ICD-10-CM

## 2022-04-27 DIAGNOSIS — J32.9 SINUSITIS, UNSPECIFIED CHRONICITY, UNSPECIFIED LOCATION: ICD-10-CM

## 2022-04-27 DIAGNOSIS — M54.6 CHRONIC RIGHT-SIDED THORACIC BACK PAIN: ICD-10-CM

## 2022-04-27 DIAGNOSIS — G89.29 CHRONIC RIGHT-SIDED THORACIC BACK PAIN: ICD-10-CM

## 2022-04-27 DIAGNOSIS — R05.9 COUGH: ICD-10-CM

## 2022-04-27 DIAGNOSIS — R97.20 ELEVATED PSA: ICD-10-CM

## 2022-04-27 DIAGNOSIS — J44.1 COPD EXACERBATION (HCC): ICD-10-CM

## 2022-04-27 DIAGNOSIS — R05.9 COUGH: Primary | ICD-10-CM

## 2022-04-27 DIAGNOSIS — R42 LIGHTHEADEDNESS: ICD-10-CM

## 2022-04-27 PROCEDURE — 3074F SYST BP LT 130 MM HG: CPT | Performed by: FAMILY MEDICINE

## 2022-04-27 PROCEDURE — 3725F SCREEN DEPRESSION PERFORMED: CPT | Performed by: FAMILY MEDICINE

## 2022-04-27 PROCEDURE — 36415 COLL VENOUS BLD VENIPUNCTURE: CPT

## 2022-04-27 PROCEDURE — 84166 PROTEIN E-PHORESIS/URINE/CSF: CPT | Performed by: PATHOLOGY

## 2022-04-27 PROCEDURE — 84154 ASSAY OF PSA FREE: CPT

## 2022-04-27 PROCEDURE — 99214 OFFICE O/P EST MOD 30 MIN: CPT | Performed by: FAMILY MEDICINE

## 2022-04-27 PROCEDURE — 3078F DIAST BP <80 MM HG: CPT | Performed by: FAMILY MEDICINE

## 2022-04-27 PROCEDURE — 84153 ASSAY OF PSA TOTAL: CPT

## 2022-04-27 PROCEDURE — 71046 X-RAY EXAM CHEST 2 VIEWS: CPT

## 2022-04-27 PROCEDURE — 1160F RVW MEDS BY RX/DR IN RCRD: CPT | Performed by: FAMILY MEDICINE

## 2022-04-27 PROCEDURE — 1036F TOBACCO NON-USER: CPT | Performed by: FAMILY MEDICINE

## 2022-04-27 RX ORDER — METHOCARBAMOL 500 MG/1
500 TABLET, FILM COATED ORAL 3 TIMES DAILY PRN
Qty: 30 TABLET | Refills: 0 | Status: CANCELLED | OUTPATIENT
Start: 2022-04-27

## 2022-04-27 RX ORDER — METHOCARBAMOL 500 MG/1
500 TABLET, FILM COATED ORAL 3 TIMES DAILY PRN
Qty: 30 TABLET | Refills: 0 | Status: SHIPPED | OUTPATIENT
Start: 2022-04-27

## 2022-04-27 RX ORDER — DOXYCYCLINE HYCLATE 100 MG/1
100 CAPSULE ORAL EVERY 12 HOURS SCHEDULED
Qty: 14 CAPSULE | Refills: 0 | Status: SHIPPED | OUTPATIENT
Start: 2022-04-27 | End: 2022-05-04

## 2022-04-27 RX ORDER — PREDNISONE 10 MG/1
TABLET ORAL
Qty: 32 TABLET | Refills: 0 | Status: SHIPPED | OUTPATIENT
Start: 2022-04-27 | End: 2022-05-12

## 2022-04-27 NOTE — ASSESSMENT & PLAN NOTE
- Wheezing throughout, crackles left base concern for pneumonia   - Chest XR today   - Doxycyline 100 mg BID

## 2022-04-27 NOTE — ASSESSMENT & PLAN NOTE
- Concern for hypotensive episodes, discussed to follow-up with cardiology regarding metoprolol dosing or possibly using Lasix PRN, he will call and check in   - Asymptomatic today

## 2022-04-27 NOTE — PROGRESS NOTES
Assessment/Plan:       Problem List Items Addressed This Visit        Respiratory    COPD exacerbation ([address] )     - Prednisone taper   - Advised to let us know how he is feeling in a couple days via MyChart          Relevant Medications    doxycycline hyclate (VIBRAMYCIN) 100 mg capsule    predniSONE 10 mg tablet       Other    Elevated PSA    Relevant Orders    Ambulatory Referral to Urology    Chronic right-sided thoracic back pain     - Refill provided          Relevant Medications    methocarbamol (ROBAXIN) 500 mg tablet    Cough - Primary     - Wheezing throughout, crackles left base concern for pneumonia   - Chest XR today   - Doxycyline 100 mg BID          Relevant Medications    doxycycline hyclate (VIBRAMYCIN) 100 mg capsule    predniSONE 10 mg tablet    Other Relevant Orders    XR chest pa & lateral    Lightheadedness     - Concern for hypotensive episodes, discussed to follow-up with cardiology regarding metoprolol dosing or possibly using Lasix PRN, he will call and check in   - Asymptomatic today         Urinary frequency    Relevant Orders    Ambulatory Referral to Urology    Incomplete bladder emptying    Relevant Orders    Ambulatory Referral to Urology      Other Visit Diagnoses     Sinusitis, unspecified chronicity, unspecified location                Subjective:      Patient ID: Marcela Hurd is a 67 y o  male  HPI     Coughing up green and yellow mucous, green/yellow mucous nose, a lot of congestion  Frontal headaches  Appetite down  No sore throat  Unsure if fevers  Breathing has been worse with this  Took prednisone started 4/18, didn't seem to help  At home oxygen 66-68% so had to restart oxygen use, more so recently  He is feeling dizziness intermittently, lightheadedness with standing  Occurs infrequently every 3-4 days  -129/56-76  Lasix 40 mg daily with potassium for leg swelling, metoprolol 75 mg daily in morning and 25 mg HS   Having more heart racing within past 2 weeks with being sick as well, no chest pains  Elevated PSA, also having more urinary symptoms frequency and incomplete emptying  The following portions of the patient's history were reviewed and updated as appropriate: allergies, current medications, past family history, past medical history, past social history, past surgical history, and problem list     Review of Systems   All other systems reviewed and are negative  Objective:      /58   Pulse 89   Temp (!) 96 7 °F (35 9 °C) (Tympanic)   Resp 20   Ht 5' 8" (1 727 m)   Wt 94 8 kg (209 lb)   SpO2 (!) 84%   BMI 31 78 kg/m²          Physical Exam  Vitals reviewed  Constitutional:       General: He is not in acute distress  Appearance: Normal appearance  He is not ill-appearing, toxic-appearing or diaphoretic  HENT:      Head: Normocephalic and atraumatic  Right Ear: Ear canal and external ear normal  A middle ear effusion is present  There is no impacted cerumen  Left Ear: Ear canal and external ear normal  A middle ear effusion is present  There is no impacted cerumen  Nose: Congestion and rhinorrhea present  Mouth/Throat:      Mouth: Mucous membranes are moist       Pharynx: No oropharyngeal exudate or posterior oropharyngeal erythema  Eyes:      General:         Right eye: No discharge  Left eye: No discharge  Extraocular Movements: Extraocular movements intact  Conjunctiva/sclera: Conjunctivae normal    Cardiovascular:      Rate and Rhythm: Normal rate and regular rhythm  Heart sounds: Normal heart sounds  No murmur heard  No friction rub  No gallop  Pulmonary:      Effort: Pulmonary effort is normal  No respiratory distress  Breath sounds: No stridor  Wheezing present  No rhonchi  Comments: Crackles left lung base   Musculoskeletal:         General: No swelling, tenderness or signs of injury  Skin:     General: Skin is warm  Coloration: Skin is not pale  Findings: No erythema or rash  Neurological:      Mental Status: He is alert and oriented to person, place, and time  Motor: No weakness     Psychiatric:         Mood and Affect: Mood normal          Behavior: Behavior normal              DO Kaleb Peacock [address] [place] [address]

## 2022-04-27 NOTE — TELEPHONE ENCOUNTER
Called and spoke with patient after visit today  Asked patient if he was able to recheck his O2 levels  Patient got a reading of 91  Informed patient of providers recommendation to start using oxygen again as soon as he got home  Patient verbalized understanding

## 2022-04-28 DIAGNOSIS — J44.1 COPD EXACERBATION (HCC): ICD-10-CM

## 2022-04-28 LAB
PSA FREE MFR SERPL: 4 %
PSA FREE SERPL-MCNC: 0.72 NG/ML
PSA SERPL-MCNC: 18.2 NG/ML (ref 0–4)

## 2022-04-28 RX ORDER — ALBUTEROL SULFATE 2.5 MG/3ML
2.5 SOLUTION RESPIRATORY (INHALATION) EVERY 6 HOURS PRN
Qty: 60 ML | Refills: 1 | Status: SHIPPED | OUTPATIENT
Start: 2022-04-28 | End: 2022-05-25 | Stop reason: SDUPTHER

## 2022-04-28 NOTE — TELEPHONE ENCOUNTER
Patient called and noted that the script given to him by the pharmacy was the incorrect script stating that the solution needed to be diluted before use, patient stated that he spoke with the pharmacy needed us to re send the script, informed the  patient it would be sent as soon as we can,

## 2022-04-29 ENCOUNTER — TELEPHONE (OUTPATIENT)
Dept: UROLOGY | Facility: MEDICAL CENTER | Age: 77
End: 2022-04-29

## 2022-04-29 NOTE — TELEPHONE ENCOUNTER
Please Triage -   New Patient- Shelly Police    What is the reason for the patients appointment? Patient called stating he was referred to see Urology for Elevated PSA 18 2  He would like to schedule as soon as possible  Imaging/Lab Results:in epic       Do we accept the patient's insurance or is the patient Self-Pay? Provider & Plan: Yina Herzog   Member ID#: Has the patient had any previous urologist(s)?no        Have patient records been requested?in epic        Has the patient had any outside testing done?in [facility]       Does the patient have a personal history of cancer?skin cancer several years ago         Patient can be reached at :

## 2022-05-06 ENCOUNTER — OFFICE VISIT (OUTPATIENT)
Dept: UROLOGY | Facility: CLINIC | Age: 77
End: 2022-05-06
Payer: COMMERCIAL

## 2022-05-06 VITALS
HEIGHT: 68 IN | SYSTOLIC BLOOD PRESSURE: 130 MMHG | DIASTOLIC BLOOD PRESSURE: 72 MMHG | BODY MASS INDEX: 31.67 KG/M2 | HEART RATE: 80 BPM | WEIGHT: 209 LBS

## 2022-05-06 DIAGNOSIS — R97.20 ELEVATED PSA: Primary | ICD-10-CM

## 2022-05-06 PROCEDURE — 99204 OFFICE O/P NEW MOD 45 MIN: CPT | Performed by: UROLOGY

## 2022-05-06 NOTE — PROGRESS NOTES
UROLOGY NEW CONSULT NOTE     CHIEF COMPLAINT   Marcela Hurd is a 67 y o  male with a complaint of   Chief Complaint   Patient presents with   [facility]    Elevated PSA       History of Present Illness:     67 y o  male with significant medical comorbidities including coronary artery disease, ischemic heart disease and CHF along with severe COPD and recent pneumonia, oxygen dependent  Patient reports a remote history of an elevated PSA around 7  He discuss his recent was primary care doctor and his PSA was rechecked at 9  Short interval recheck led to a PSA level of 18  Patient reports a history progressive urinary obstructive symptoms over last 6 months with some nocturia and occasional dribbling  He is on Lasix  Presents today for discussion of his PSA    No family history of prostate cancer    AUA SYMPTOM SCORE      Most Recent Value   AUA SYMPTOM SCORE    How often have you had a sensation of not emptying your bladder completely after you finished urinating? 3 (P)     How often have you had to urinate again less than two hours after you finished urinating? 3 (P)     How often have you found you stopped and started again several times when you urinate? 3 (P)     How often have you found it difficult to postpone urination? 3 (P)     How often have you had a weak urinary stream? 2 (P)     How often have you had to push or strain to begin urination? 2 (P)     How many times did you most typically get up to urinate from the time you went to bed at night until the time you got up in the morning? 0 (P)     Quality of Life: If you were to spend the rest of your life with your urinary condition just the way it is now, how would you feel about that? 3 (P)     AUA SYMPTOM SCORE 16 (P)         Lab Results   Component Value Date    PSA 18 2 (H) 04/27/2022    PSA 9 1 (H) 04/18/2022       Past Medical History:     Past Medical History:   Diagnosis Date    Bilateral carotid artery stenosis     [facility]     Chronic ischemic heart disease     COPD [facility]chronic obstructive pulmonary disease) ([facility])     Coronary artery disease     hx stents, MI, PCI    Hearing loss     Hyperlipidemia     Hypertension     MI (myocardial infarction) ([facility] [address] ) 1995    S/P carotid endarterectomy     Sleep apnea     Sleep apnea, obstructive     Stented coronary artery        PAST SURGICAL HISTORY:     Past Surgical History:   Procedure Laterality Date    CARDIAC CATHETERIZATION  03/18/2021    left main with no significant disease, proximal LAD with 10% stenosis at the site of prior stent, left circumflex artery with minimal luminal irregularities mid RCA with 50% stenosis at site of prior stent and PL segment with distal disease supplied by collaterals from the distal circumflex with no significant CAD requiring revascularization at that time   COLONOSCOPY      CORONARY ANGIOPLASTY WITH STENT PLACEMENT  1999    RCA    CORONARY ANGIOPLASTY WITH STENT PLACEMENT  2001    RCA    CORONARY ANGIOPLASTY WITH STENT PLACEMENT  2003    LAD    EYE SURGERY      SKIN CANCER EXCISION      arm       CURRENT MEDICATIONS:     Current Outpatient Medications   Medication Sig Dispense Refill    albuterol (2 5 mg/3 mL) 0 083 % nebulizer solution Take 3 mL (2 5 mg total) by nebulization every 6 (six) hours as needed for wheezing or shortness of breath 60 mL 1    Ascorbic Acid (vitamin C) 1000 MG tablet Take 1,000 mg by mouth daily      aspirin 81 mg chewable tablet CHEW ONE TABLET BY MOUTH EVERY DAY      azithromycin (ZITHROMAX) 250 mg tablet Take 1 every Monday, Wednesday and Friday  45 tablet 3    Blood Pressure Monitor KIT Use daily 1 kit 0    fluticasone (FLONASE) 50 mcg/act nasal spray into each nostril as needed       fluticasone-salmeterol (Wixela Inhub) 500-50 mcg/dose inhaler Inhale 1 puff in the morning Rinse mouth after use        furosemide (LASIX) 40 mg tablet Take 1 tablet (40 mg total) by mouth daily 100 tablet 3    methocarbamol (ROBAXIN) 500 mg tablet Take 1 tablet (500 mg total) by mouth 3 (three) times a day as needed for muscle spasms 30 tablet 0    metoprolol succinate (TOPROL-XL) 25 mg 24 hr tablet Take 1 tablet (25 mg total) by mouth daily 30 tablet 3    metoprolol succinate (TOPROL-XL) 50 mg 24 hr tablet Take 1 tablet (50 mg total) by mouth 2 (two) times a day 180 tablet 3    Multiple Vitamin (multivitamin) tablet Take 1 tablet by mouth daily      nitroglycerin (NITROSTAT) 0 4 mg SL tablet DISSOLVE ONE TABLET UNDER THE TONGUE  PRN      potassium chloride (Klor-Con) 10 mEq tablet Take 2 tablets (20 mEq total) by mouth daily 200 tablet 3    predniSONE 10 mg tablet Take 4 tablets (40 mg total) by mouth daily for 3 days, THEN 3 tablets (30 mg total) daily for 3 days, THEN 2 tablets (20 mg total) daily for 3 days, THEN 1 tablet (10 mg total) daily for 3 days, THEN 0 5 tablets (5 mg total) daily for 3 days  32 tablet 0    rosuvastatin (CRESTOR) 40 MG tablet Take 1 tablet (40 mg total) by mouth daily 100 tablet 3    Tiotropium Bromide Monohydrate (SPIRIVA RESPIMAT IN) Inhale       No current facility-administered medications for this visit         ALLERGIES:     Allergies   Allergen Reactions    Lisinopril Swelling and Cough    Tetanus Antitoxin Anaphylaxis    Tetanus Toxoid Anaphylaxis and Swelling       SOCIAL HISTORY:     Social History     Socioeconomic History    Marital status: /Civil Union     Spouse name: None    Number of children: None    Years of education: None    Highest education level: None   Occupational History    None   Tobacco Use    Smoking status: Former Smoker     Quit date:      Years since quittin 3    Smokeless tobacco: Never Used   Vaping Use    Vaping Use: Never used   Substance and Sexual Activity    Alcohol use: Not Currently    Drug use: Never    Sexual activity: None   Other Topics Concern    None   Social History Narrative    None Social Determinants of Health     Financial Resource Strain: Not on file   Food Insecurity: Not on file   Transportation Needs: Not on file   Physical Activity: Not on file   Stress: Not on file   Social Connections: Not on file   Intimate Partner Violence: Not on file   Housing Stability: Not on file       SOCIAL HISTORY:     Family History   Problem Relation Age of Onset    Heart attack Mother     Dementia Mother        REVIEW OF SYSTEMS:     Review of Systems      PHYSICAL EXAM:     /72 (BP Location: Left arm, Patient Position: Sitting, Cuff Size: Adult)   Pulse 80   Ht 5' 8" (1 727 m)   Wt 94 8 kg (209 lb)   BMI 31 78 kg/m²     General:  Elderly male  in no acute distress  They have a normal affect  There is not appear to be any gross neurologic defects or abnormalities  HEENT:  Normocephalic, atraumatic  Neck is supple without any palpable lymphadenopathy  Cardiovascular:  Patient has normal palpable distal radial pulses  There is no significant peripheral edema  No JVD is noted  Respiratory:  Coarse audible respirations, nasal cannula oxygen  Abdomen:  Abdomen is without visible incisions, large suprapubic fat pad  Abdomen is soft and nontender  There is no tympany  Inguinal and umbilical hernia are not appreciated  Genitourinary:  Buried phallus, no penile lesions or discharge, no testicular masses or tenderness, no hernias, heart constipated stool in the rectal vault, prostate is smooth    Musculoskeletal:  Patient does not have significant CVA tenderness in the  flank with palpation or percussion  They full range of motion in all 4 extremities  Strength in all 4 extremities appears congruent  Patient is able to ambulate without assistance or difficulty  Dermatologic:  Patient has no skin abnormalities or rashes        LABS:     CBC:   Lab Results   Component Value Date    WBC 11 31 (H) 04/18/2022    HGB 16 3 04/18/2022    HCT 48 4 04/18/2022    MCV 94 04/18/2022     2022       BMP:   Lab Results   Component Value Date    CALCIUM 8 9 2022    K 3 8 2022    CO2 33 (H) 2022     2022    BUN 17 2022    CREATININE 1 30 2022  9:51 AM    Color, UA  Light Yellow    Clarity, UA  Clear    Specific [place] [address], UA 1 003 - 1 030 1 012    pH, UA 4 5, 5 0, 5 5, 6 0, 6 5, 7 0, 7 5, 8 0 6 0    Leukocytes, UA Negative Negative    Nitrite, UA Negative Negative    Protein, UA Negative mg/dl Trace Abnormal     Glucose, UA Negative mg/dl Negative    Ketones, UA Negative mg/dl Negative    Urobilinogen, UA <2 0 mg/dl mg/dl <2 0    Bilirubin, UA Negative Negative    Blood, UA Negative Negative    RBC, UA None Seen, 1-2 /hpf None Seen    WBC, UA None Seen, 1-2 /hpf 1-2    Epithelial Cells None Seen, Occasional /hpf None Seen    Bacteria, UA None Seen, Occasional /hpf None Seen    Hyaline Casts, UA None Seen /lpf 0-3 Abnormal         IMAGIN/3/22  RENAL ULTRASOUND     INDICATION:   R10 9: Unspecified abdominal pain      COMPARISON: None     TECHNIQUE:   Ultrasound of the retroperitoneum was performed with a curvilinear transducer utilizing volumetric sweeps and still imaging techniques       FINDINGS:     KIDNEYS:  Symmetric and normal size  Right kidney:  10 8 x 6 2 x 5 6 cm  Left kidney:  11 6 x 4 9 x 3 6 cm      Right kidney  Normal echogenicity and contour  No suspicious masses detected  9 mm simple right renal cyst is noted  No hydronephrosis  No shadowing calculi  No perinephric fluid collections      Left kidney  Normal echogenicity and contour  No suspicious masses detected  4 mm left renal cyst is noted  No hydronephrosis  No shadowing calculi  No perinephric fluid collections      URETERS:  Nonvisualized      BLADDER:   Normally distended  No focal thickening or mass lesions    Bilateral ureteral jets detected         IMPRESSION:     Subcentimeter renal cysts, otherwise normal study       Workstation performed: [id number]    ASSESSMENT:     67 y o  male with elevated PSA    PLAN:     The elevated PSA and short interval recheck with severe elevation to the 18 range leads me to believe this is more likely to be inflammatory  Patient's rectal exam today is normal   However, given the chronic elevation above 7, I have offered the patient multiparametric MRI testing for imaging of the prostate  Given his age and comorbidities, we need to be thoughtful about aggressive interventions in this patient as he would be less likely to benefit from aggressive treatment of a lower risk prostate cancer  Further discussions can be held once the results of the MRI are completed    Patient is agreeable to the plan

## 2022-05-12 ENCOUNTER — TELEPHONE (OUTPATIENT)
Dept: PAIN MEDICINE | Facility: CLINIC | Age: 77
End: 2022-05-12

## 2022-05-12 NOTE — TELEPHONE ENCOUNTER
Patient requesting [facility] NPI # they are trying to assist patient with approval in network MRI     Currently [facility] is not showing up as in-network  Please advise and call patient back directly       [address]

## 2022-05-19 ENCOUNTER — HOSPITAL ENCOUNTER (OUTPATIENT)
Dept: MRI IMAGING | Facility: HOSPITAL | Age: 77
Discharge: HOME | End: 2022-05-19
Payer: COMMERCIAL

## 2022-05-19 DIAGNOSIS — G89.4 CHRONIC PAIN SYNDROME: ICD-10-CM

## 2022-05-19 DIAGNOSIS — G89.29 CHRONIC BILATERAL LOW BACK PAIN WITH RIGHT-SIDED SCIATICA: ICD-10-CM

## 2022-05-19 DIAGNOSIS — M54.16 LUMBAR RADICULOPATHY: ICD-10-CM

## 2022-05-19 DIAGNOSIS — M54.14 THORACIC RADICULOPATHY: ICD-10-CM

## 2022-05-19 DIAGNOSIS — M54.41 CHRONIC BILATERAL LOW BACK PAIN WITH RIGHT-SIDED SCIATICA: ICD-10-CM

## 2022-05-19 DIAGNOSIS — M54.9 MID BACK PAIN: ICD-10-CM

## 2022-05-19 PROCEDURE — 72148 MRI LUMBAR SPINE W/O DYE: CPT

## 2022-05-19 PROCEDURE — 72146 MRI CHEST SPINE W/O DYE: CPT

## 2022-05-19 PROCEDURE — G1004 CDSM NDSC: HCPCS

## 2022-05-25 ENCOUNTER — OFFICE VISIT (OUTPATIENT)
Dept: FAMILY MEDICINE CLINIC | Facility: CLINIC | Age: 77
End: 2022-05-25
Payer: COMMERCIAL

## 2022-05-25 ENCOUNTER — APPOINTMENT (OUTPATIENT)
Dept: RADIOLOGY | Facility: CLINIC | Age: 77
End: 2022-05-25
Payer: COMMERCIAL

## 2022-05-25 VITALS
HEART RATE: 83 BPM | TEMPERATURE: 96.7 F | OXYGEN SATURATION: 96 % | HEIGHT: 68 IN | SYSTOLIC BLOOD PRESSURE: 122 MMHG | RESPIRATION RATE: 18 BRPM | BODY MASS INDEX: 31.55 KG/M2 | DIASTOLIC BLOOD PRESSURE: 60 MMHG | WEIGHT: 208.2 LBS

## 2022-05-25 DIAGNOSIS — R07.9 INTERMITTENT CHEST PAIN: Primary | ICD-10-CM

## 2022-05-25 DIAGNOSIS — R07.9 INTERMITTENT CHEST PAIN: ICD-10-CM

## 2022-05-25 DIAGNOSIS — J44.1 COPD EXACERBATION (HCC): ICD-10-CM

## 2022-05-25 DIAGNOSIS — R05.9 COUGH: ICD-10-CM

## 2022-05-25 PROCEDURE — 3078F DIAST BP <80 MM HG: CPT | Performed by: FAMILY MEDICINE

## 2022-05-25 PROCEDURE — 3725F SCREEN DEPRESSION PERFORMED: CPT | Performed by: FAMILY MEDICINE

## 2022-05-25 PROCEDURE — 1160F RVW MEDS BY RX/DR IN RCRD: CPT | Performed by: FAMILY MEDICINE

## 2022-05-25 PROCEDURE — 3074F SYST BP LT 130 MM HG: CPT | Performed by: FAMILY MEDICINE

## 2022-05-25 PROCEDURE — 99214 OFFICE O/P EST MOD 30 MIN: CPT | Performed by: FAMILY MEDICINE

## 2022-05-25 PROCEDURE — 71046 X-RAY EXAM CHEST 2 VIEWS: CPT

## 2022-05-25 PROCEDURE — 1036F TOBACCO NON-USER: CPT | Performed by: FAMILY MEDICINE

## 2022-05-25 PROCEDURE — 93000 ELECTROCARDIOGRAM COMPLETE: CPT | Performed by: FAMILY MEDICINE

## 2022-05-25 RX ORDER — PREDNISONE 20 MG/1
20 TABLET ORAL 2 TIMES DAILY WITH MEALS
Qty: 10 TABLET | Refills: 0 | Status: SHIPPED | OUTPATIENT
Start: 2022-05-25 | End: 2022-05-30

## 2022-05-25 RX ORDER — ALBUTEROL SULFATE 2.5 MG/3ML
2.5 SOLUTION RESPIRATORY (INHALATION) EVERY 6 HOURS PRN
Qty: 60 ML | Refills: 1 | Status: SHIPPED | OUTPATIENT
Start: 2022-05-25

## 2022-05-25 RX ORDER — DOXYCYCLINE HYCLATE 100 MG/1
100 CAPSULE ORAL EVERY 12 HOURS SCHEDULED
Qty: 10 CAPSULE | Refills: 0 | Status: SHIPPED | OUTPATIENT
Start: 2022-05-25 | End: 2022-05-30

## 2022-05-25 NOTE — PROGRESS NOTES
Assessment/Plan:       Problem List Items Addressed This Visit        Respiratory    COPD exacerbation ([address] )     - We discussed any worsening symptoms or no improvement ED eval, low threshold for this   - Follow-up pulmonology            Relevant Medications    albuterol (2 5 mg/3 mL) 0 083 % nebulizer solution    predniSONE 20 mg tablet    doxycycline hyclate (VIBRAMYCIN) 100 mg capsule    Other Relevant Orders    XR chest pa & lateral       Other    Intermittent chest pain - Primary     - EKG no acute changes            Relevant Orders    POCT ECG (Completed)    XR chest pa & lateral    Cough    Relevant Medications    predniSONE 20 mg tablet    doxycycline hyclate (VIBRAMYCIN) 100 mg capsule    Other Relevant Orders    XR chest pa & lateral            Subjective:      Patient ID: Lou Mcdonald is a 65 y o  male  HPI     Cough, chest pain- In April was seen for cough and COPD exacerbation, felt better and then a week following felt well, but then symptoms returned  Coughing mucous, worsening shortness of breath, runny nose  Sore throat  Taking cough medicine without relief  The side of ribs hurts with coughing, same pain  No chest pains right now, had has had chest pains in center especially with coughing  No fevers  Using oxygen 2 5 L, short fo breath with just short distances  The following portions of the patient's history were reviewed and updated as appropriate: allergies, current medications, past family history, past medical history, past social history, past surgical history, and problem list     Review of Systems   All other systems reviewed and are negative  Objective:      /60   Pulse 83   Temp (!) 96 7 °F (35 9 °C) (Tympanic)   Resp 18   Ht 5' 8" (1 727 m)   Wt 94 4 kg (208 lb 3 2 oz)   SpO2 96%   BMI 31 66 kg/m²          Physical Exam  Vitals reviewed  Constitutional:       General: He is not in acute distress  Appearance: Normal appearance   He is not ill-appearing, toxic-appearing or diaphoretic  HENT:      Head: Normocephalic and atraumatic  Eyes:      General:         Right eye: No discharge  Left eye: No discharge  Extraocular Movements: Extraocular movements intact  Conjunctiva/sclera: Conjunctivae normal    Cardiovascular:      Rate and Rhythm: Normal rate and regular rhythm  Heart sounds: Normal heart sounds  No murmur heard  No friction rub  No gallop  Comments: Distant heart sounds   Pulmonary:      Effort: Pulmonary effort is normal  No respiratory distress  Breath sounds: No stridor  Wheezing present  No rhonchi  Comments: Significantly diminished lung sounds   Musculoskeletal:         General: No swelling, tenderness or signs of injury  Skin:     General: Skin is warm  Coloration: Skin is not pale  Findings: No erythema or rash  Neurological:      Mental Status: He is alert and oriented to person, place, and time  Motor: No weakness     Psychiatric:         Mood and Affect: Mood normal          Behavior: Behavior normal              DO Kaleb Faye [address] [place] [address]

## 2022-05-27 ENCOUNTER — OFFICE VISIT (OUTPATIENT)
Dept: PAIN MEDICINE | Facility: CLINIC | Age: 77
End: 2022-05-27
Payer: COMMERCIAL

## 2022-05-27 VITALS — WEIGHT: 208 LBS | BODY MASS INDEX: 31.52 KG/M2 | HEIGHT: 68 IN

## 2022-05-27 DIAGNOSIS — G89.29 CHRONIC RIGHT-SIDED THORACIC BACK PAIN: ICD-10-CM

## 2022-05-27 DIAGNOSIS — M54.6 CHRONIC RIGHT-SIDED THORACIC BACK PAIN: ICD-10-CM

## 2022-05-27 DIAGNOSIS — G58.8 INTERCOSTAL NEURALGIA: ICD-10-CM

## 2022-05-27 DIAGNOSIS — G89.4 CHRONIC PAIN SYNDROME: Primary | ICD-10-CM

## 2022-05-27 PROCEDURE — 99214 OFFICE O/P EST MOD 30 MIN: CPT | Performed by: NURSE PRACTITIONER

## 2022-05-27 RX ORDER — GABAPENTIN 100 MG/1
CAPSULE ORAL
Qty: 90 CAPSULE | Refills: 1 | Status: SHIPPED | OUTPATIENT
Start: 2022-05-27 | End: 2022-07-14

## 2022-05-27 NOTE — PATIENT INSTRUCTIONS
Duloxetine (By mouth)   Duloxetine ([id number]-[id number])  Treats depression, anxiety, diabetic peripheral neuropathy, fibromyalgia, and chronic muscle or bone pain  This medicine is an SSNRI  Brand Name(s): Cymbalta, Drizalma Sprinkle, Irenka   There may be other brand names for this medicine  When This Medicine Should Not Be Used: This medicine is not right for everyone  Do not use it if you had an allergic reaction to duloxetine  How to Use This Medicine:   Capsule, Delayed Release Capsule  Take your medicine as directed  Your dose may need to be changed several times to find what works best for you  Delayed-release capsule: Swallow the capsule whole  Do not crush, chew, break, or open it  Do not open the Cymbalta® delayed-release capsule and sprinkle the contents on food or in liquids  If you have trouble swallowing the Clearence [place] delayed release capsule: You may open the capsule and sprinkle the contents over one tablespoon (15 mL) of applesauce  Swallow the mixture right away and do not save any of the mixture to use later  You may open the capsule and pour the contents to an all plastic catheter tip syringe and add 50 mL of water  Do not use other liquids  Gently shake it for 10 seconds, and then use it through a nasogastric tube  Rinse with additional water (about 15 mL) if needed  This medicine should come with a Medication Guide  Ask your pharmacist for a copy if you do not have one  Missed dose: Take a dose as soon as you remember  If it is almost time for your next dose, wait until then and take a regular dose  Do not take extra medicine to make up for a missed dose  Store the medicine in a closed container at room temperature, away from heat, moisture, and direct light  Drugs and Foods to Avoid:   Ask your doctor or pharmacist before using any other medicine, including over-the-counter medicines, vitamins, and herbal products    Do not take duloxetine if you have used an MAO inhibitor (MAOI) within the past 14 days  Do not start taking an MAO inhibitor within 5 days of stopping duloxetine  Ask your doctor if you are not sure if you take an MAOI, including linezolid or methylene blue injection  Some medicines can affect how duloxetine works  Tell your doctor if you are using any of the following:  Buspirone, cimetidine, ciprofloxacin, enoxacin, fentanyl, fluvoxamine, lithium, Yuri's wort, theophylline, tramadol, tryptophan, warfarin  Amphetamines  Blood pressure medicine  Diuretic (water pill)  Medicine for heart rhythm problems (including flecainide, propafenone, quinidine)  Medicine to treat migraine headaches (including triptans)  NSAID pain or arthritis medicine (including aspirin, celecoxib, diclofenac, ibuprofen, naproxen)  Other medicine to treat depression or mood disorders (including amitriptyline, desipramine, fluoxetine, imipramine, nortriptyline, paroxetine)  Phenothiazine medicine (including thioridazine)  Stomach medicine (including famotidine, antacids containing aluminum or magnesium, PPIs)  Tell your doctor if you use anything else that makes you sleepy  Some examples are allergy medicine, narcotic pain medicine, and alcohol  Do not drink alcohol while you are using this medicine  Warnings While Using This Medicine:   Tell your doctor if you are pregnant or breastfeeding, or if you have kidney disease, liver disease, bleeding problems, diabetes, digestion problems, glaucoma, heart disease, high or low blood pressure, or problems with urination  Tell your doctor if you smoke or you have a history of seizures, mental health problems (including bipolar disorder, nadia), or drug or alcohol addiction    This medicine may cause the following problems:   Serious liver problems  Serotonin syndrome, when used with certain medicines  Increased risk of bleeding problems  Serious skin reactions, including erythema multiforme, Christensen-Enrique syndrome  Low sodium levels in the blood  Sexual problems  This medicine can increase thoughts of suicide  Tell your doctor right away if you start to feel depressed and have thoughts about hurting yourself  This medicine may cause blurred vision, dizziness, drowsiness, trouble with thinking, or trouble with controlling body movements, which may lead to falls, fractures, or other injuries  Do not drive or do anything that could be dangerous until you know how this medicine affects you  Stand up slowly to avoid falls  Do not stop using this medicine suddenly  Your doctor will need to slowly decrease your dose before you stop it completely  Your doctor will check your progress and the effects of this medicine at regular visits  Keep all appointments  Keep all medicine out of the reach of children  Never share your medicine with anyone  Possible Side Effects While Using This Medicine:   Call your doctor right away if you notice any of these side effects:   Allergic reaction: Itching or hives, swelling in your face or hands, swelling or tingling in your mouth or throat, chest tightness, trouble breathing  Anxiety, restlessness, fever, fast heartbeat, sweating, muscle spasms, diarrhea, seeing or hearing things that are not there  Blistering, peeling, red skin rash  Confusion, weakness, muscle twitching  Dark urine or pale stools, nausea, vomiting, loss of appetite, stomach pain, yellow skin or eyes  Decrease in how much or how often you urinate  Decrease in sexual ability, desire, drive, or performance  Eye pain, vision changes, seeing halos around lights  Feeling more energetic than usual  Lightheadedness, dizziness, fainting  Unusual moods or behaviors, worsening depression, thoughts about hurting yourself, trouble sleeping  Unusual bleeding or bruising  If you notice these less serious side effects, talk with your doctor:   Decrease in appetite or weight  Dry mouth, constipation, mild nausea  Headache  Unusual drowsiness, sleepiness, or tiredness  If you notice other side effects that you think are caused by this medicine, tell your doctor  Call your doctor for medical advice about side effects  You may report side effects to FDA at 1-755-XLV-4520    © Copyright [employer] 2022 Information is for End User's use only and may not be sold, redistributed or otherwise used for commercial purposes  The above information is an  only  It is not intended as medical advice for individual conditions or treatments  Talk to your doctor, nurse or pharmacist before following any medical regimen to see if it is safe and effective for you

## 2022-05-27 NOTE — PROGRESS NOTES
Assessment:  1  Chronic pain syndrome    2  Chronic right-sided thoracic back pain    3  Intercostal neuralgia        Plan:  While the patient was in the office today, I did have a thorough conversation regarding their chronic pain syndrome, medication management, and treatment plan options  Patient is being seen for follow-up visit  He was initially seen here for consultation on 04/18/2022 x-rays and MRIs of his thoracic and lumbar spine were ordered  MRI of the thoracic spine reveals mild noncompressive degenerative discogenic disease  No cord signal abnormality  There are skeletal findings consistent with diffuse idiopathic skeletal hyperostosis of the cervical and thoracic spine  MRI of the lumbar spine reveals mild multilevel degenerative discogenic disease and facet arthropathy with prominence of dorsal epidural fat these findings result in mild multilevel spinal stenosis and bilateral L4 foraminal encroachment  There is slightly clumped or adherent appearance of the nerve roots cauda equina at the L4 and L5 levels  Arachnoiditis is not excluded  MRI results were reviewed with patient during today's visit  Patient demonstrates physical findings that may be consistent with intercostal neuralgia  I discussed with the patient that I feel a medication such as Neurontin would be helpful in treating his pain  I discussed with patient the type of medication it is, however works, and that it requires a titration process that is specific to each individual   I reviewed with the patient that it may take 3-4 weeks for the medications side effects to be noticed and it should never be abruptly stop  Possible side effects include, but are not limited to:  Vertigo, lethargy, nausea, and edema of the extremities  Advised the patient to call our office if he experience any side effects  The patient verbalized an understanding  Start gabapentin titrating to 100 milligrams 3 times daily      Will consider diagnostic/therapeutic intercostal nerve blocks in the future  The patient will follow-up in 1 month for medication prescription refill and reevaluation  The patient was advised to contact the office should their symptoms worsen in the interim  The patient was agreeable and verbalized an understanding  History of Present Illness: The patient is a 65 y o  male who presents for a follow up office visit in regards to Back Pain (Right sided)  The patients current symptoms include complaints of right sided mid back pain that radiates to the lower right ribcage/upper abdomen  Current pain level is a 1/10  Pain can go up to a 10/10 at times  Current pain medications includes:  Patient is a prescription for methocarbamol 500 milligrams which he uses on an as-needed basis    The patient reports that this regimen is providing up to 30 % pain relief  The patient is reporting no side effects from this pain medication regimen  I have personally reviewed and/or updated the patient's past medical history, past surgical history, family history, social history, current medications, allergies, and vital signs today  Review of Systems  Review of Systems   Cardiovascular: Positive for chest pain  Musculoskeletal: Positive for gait problem and myalgias  Decreased Range of motion   All other systems reviewed and are negative          Past Medical History:   Diagnosis Date    Bilateral carotid artery stenosis     Chronic diastolic heart failure (HCC)     Chronic ischemic heart disease     COPD (chronic obstructive pulmonary disease) (HCC)     Coronary artery disease     hx stents, MI, PCI    Hearing loss     Hyperlipidemia     Hypertension     [facility]    S/P carotid endarterectomy     Sleep apnea     Sleep apnea, obstructive     Stented coronary artery        Past Surgical History:   Procedure Laterality Date    CARDIAC CATHETERIZATION  03/18/2021 left main with no significant disease, proximal LAD with 10% stenosis at the site of prior stent, left circumflex artery with minimal luminal irregularities mid RCA with 50% stenosis at site of prior stent and PL segment with distal disease supplied by collaterals from the distal circumflex with no significant CAD requiring revascularization at that time      COLONOSCOPY      CORONARY ANGIOPLASTY WITH STENT PLACEMENT      RCA    CORONARY ANGIOPLASTY WITH STENT PLACEMENT      RCA    CORONARY ANGIOPLASTY WITH STENT PLACEMENT      LAD    EYE SURGERY      SKIN CANCER EXCISION      arm       Family History   Problem Relation Age of Onset    Heart attack Mother     Dementia Mother        Social History     Occupational History    Not on file   Tobacco Use    Smoking status: Former Smoker     Quit date:      Years since quittin 4    Smokeless tobacco: Never Used   Vaping Use    Vaping Use: Never used   Substance and Sexual Activity    Alcohol use: Not Currently    Drug use: Never    Sexual activity: Not on file         Current Outpatient Medications:     albuterol (2 5 mg/3 mL) 0 083 % nebulizer solution, Take 3 mL (2 5 mg total) by nebulization every 6 (six) hours as needed for wheezing or shortness of breath, Disp: 60 mL, Rfl: 1    aspirin 81 mg chewable tablet, CHEW ONE TABLET BY MOUTH EVERY DAY, Disp: , Rfl:     azithromycin (ZITHROMAX) 250 mg tablet, Take 1 every Monday, Wednesday and Friday , Disp: 45 tablet, Rfl: 3    Blood Pressure Monitor KIT, Use daily, Disp: 1 kit, Rfl: 0    doxycycline hyclate (VIBRAMYCIN) 100 mg capsule, Take 1 capsule (100 mg total) by mouth every 12 (twelve) hours for 5 days, Disp: 10 capsule, Rfl: 0    fluticasone (FLONASE) 50 mcg/act nasal spray, into each nostril as needed , Disp: , Rfl:     fluticasone-salmeterol (Advair) 500-50 mcg/dose inhaler, Inhale 1 puff in the morning Rinse mouth after use , Disp: , Rfl:     furosemide (LASIX) 40 mg tablet, Take 1 tablet (40 mg total) by mouth daily, Disp: 100 tablet, Rfl: 3    gabapentin (NEURONTIN) 100 mg capsule, 1 PO QHS x 1 day, then 1 PO BID x 1 day, then 1 PO TID, Disp: 90 capsule, Rfl: 1    methocarbamol (ROBAXIN) 500 mg tablet, Take 1 tablet (500 mg total) by mouth 3 (three) times a day as needed for muscle spasms, Disp: 30 tablet, Rfl: 0    metoprolol succinate (TOPROL-XL) 25 mg 24 hr tablet, Take 1 tablet (25 mg total) by mouth daily, Disp: 30 tablet, Rfl: 3    metoprolol succinate (TOPROL-XL) 50 mg 24 hr tablet, Take 1 tablet (50 mg total) by mouth 2 (two) times a day, Disp: 180 tablet, Rfl: 3    Multiple Vitamin (multivitamin) tablet, Take 1 tablet by mouth daily, Disp: , Rfl:     nitroglycerin (NITROSTAT) 0 4 mg SL tablet, DISSOLVE ONE TABLET UNDER THE TONGUE  PRN, Disp: , Rfl:     potassium chloride (Klor-Con) 10 mEq tablet, Take 2 tablets (20 mEq total) by mouth daily, Disp: 200 tablet, Rfl: 3    predniSONE 20 mg tablet, Take 1 tablet (20 mg total) by mouth 2 (two) times a day with meals for 5 days, Disp: 10 tablet, Rfl: 0    rosuvastatin (CRESTOR) 40 MG tablet, Take 1 tablet (40 mg total) by mouth daily, Disp: 100 tablet, Rfl: 3    Tiotropium Bromide Monohydrate (SPIRIVA RESPIMAT IN), Inhale, Disp: , Rfl:     Ascorbic Acid (vitamin C) 1000 MG tablet, Take 1,000 mg by mouth daily (Patient not taking: Reported on 5/27/2022), Disp: , Rfl:     Allergies   Allergen Reactions    Lisinopril Swelling and Cough    Tetanus Antitoxin Anaphylaxis    Tetanus Toxoid Anaphylaxis and Swelling       Physical Exam:    Ht 5' 8" (1 727 m)   Wt 94 3 kg (208 lb)   BMI 31 63 kg/m²     Constitutional:normal, well developed, well nourished, alert, in no distress and non-toxic and no overt pain behavior    Eyes:anicteric  HEENT:grossly intact  Neck:supple, symmetric, trachea midline and no masses   Pulmonary:even and unlabored  Cardiovascular:No edema or pitting edema present  Skin:Normal without rashes or lesions and well hydrated  Psychiatric:Mood and affect appropriate  Neurologic:Cranial Nerves II-XII grossly intact  Musculoskeletal:There is tenderness to palpation over the lower right ribcage in the posterior, lateral and anterior aspect  there is increased sensitivity to light touch in the T8-9 to T10-11 dermatomes on the right    Imaging  No orders to display       No orders of the defined types were placed in this encounter

## 2022-05-27 NOTE — ASSESSMENT & PLAN NOTE
- We discussed any worsening symptoms or no improvement ED eval, low threshold for this   - Follow-up pulmonology

## 2022-06-02 ENCOUNTER — APPOINTMENT (OUTPATIENT)
Dept: RADIOLOGY | Facility: CLINIC | Age: 77
End: 2022-06-02
Payer: COMMERCIAL

## 2022-06-02 ENCOUNTER — OFFICE VISIT (OUTPATIENT)
Dept: FAMILY MEDICINE CLINIC | Facility: CLINIC | Age: 77
End: 2022-06-02
Payer: COMMERCIAL

## 2022-06-02 ENCOUNTER — APPOINTMENT (OUTPATIENT)
Dept: LAB | Facility: CLINIC | Age: 77
End: 2022-06-02
Payer: COMMERCIAL

## 2022-06-02 VITALS
RESPIRATION RATE: 18 BRPM | OXYGEN SATURATION: 94 % | HEIGHT: 68 IN | HEART RATE: 68 BPM | TEMPERATURE: 97.5 F | DIASTOLIC BLOOD PRESSURE: 56 MMHG | SYSTOLIC BLOOD PRESSURE: 120 MMHG | BODY MASS INDEX: 31.37 KG/M2 | WEIGHT: 207 LBS

## 2022-06-02 DIAGNOSIS — M25.561 ACUTE PAIN OF RIGHT KNEE: ICD-10-CM

## 2022-06-02 DIAGNOSIS — R97.20 ELEVATED PSA: Primary | ICD-10-CM

## 2022-06-02 DIAGNOSIS — R97.20 ELEVATED PSA: ICD-10-CM

## 2022-06-02 LAB — PSA SERPL-MCNC: 12.8 NG/ML (ref 0–4)

## 2022-06-02 PROCEDURE — 1036F TOBACCO NON-USER: CPT | Performed by: FAMILY MEDICINE

## 2022-06-02 PROCEDURE — 99214 OFFICE O/P EST MOD 30 MIN: CPT | Performed by: FAMILY MEDICINE

## 2022-06-02 PROCEDURE — G0103 PSA SCREENING: HCPCS

## 2022-06-02 PROCEDURE — 3078F DIAST BP <80 MM HG: CPT | Performed by: FAMILY MEDICINE

## 2022-06-02 PROCEDURE — 36415 COLL VENOUS BLD VENIPUNCTURE: CPT

## 2022-06-02 PROCEDURE — 1160F RVW MEDS BY RX/DR IN RCRD: CPT | Performed by: FAMILY MEDICINE

## 2022-06-02 PROCEDURE — 3725F SCREEN DEPRESSION PERFORMED: CPT | Performed by: FAMILY MEDICINE

## 2022-06-02 PROCEDURE — 3074F SYST BP LT 130 MM HG: CPT | Performed by: FAMILY MEDICINE

## 2022-06-02 PROCEDURE — 73562 X-RAY EXAM OF KNEE 3: CPT

## 2022-06-02 NOTE — PROGRESS NOTES
Assessment/Plan:       Problem List Items Addressed This Visit        Other    Elevated PSA - Primary    Relevant Orders    PSA, Total Screen (Completed)      Other Visit Diagnoses     Acute pain of right knee        Relevant Orders    XR knee 3 vw right non injury            Subjective:      Patient ID: Jeanine Gray is a 65 y o  male  HPI     Elevated PSA- PSA has jumped to 9 1 to 18 2, he would like to check this again now, requesting labs  MRI prostate scheduled for July, following with urology  He reports he is frustrated with his chronic conditions limiting what he can do  PHQ-2/9 Depression Screening    Little interest or pleasure in doing things: 0 - not at all  Feeling down, depressed, or hopeless: 0 - not at all  Trouble falling or staying asleep, or sleeping too much: 0 - not at all  Feeling tired or having little energy: 0 - not at all  Poor appetite or overeatin - not at all  Feeling bad about yourself - or that you are a failure or have let yourself or your family down: 0 - not at all  Trouble concentrating on things, such as reading the newspaper or watching television: 0 - not at all  Moving or speaking so slowly that other people could have noticed  Or the opposite - being so fidgety or restless that you have been moving around a lot more than usual: 0 - not at all  Thoughts that you would be better off dead, or of hurting yourself in some way: 0 - not at all  PHQ-9 Score: 0   PHQ-9 Interpretation: No or Minimal depression        Knee pain- Recently while in bed right knee heard something pop, loud pop and pain  Chronic right leg weakness since heart attack many years ago, artery nicked during surgery  Was moving in bed and heard loud crack, sharp pain and that was it  Since then, leg feels wobbly  Can bear weight on it  Pins and needles sensations since pop in right thigh, more constant  Constant numbness in right thigh, weakness in right leg sometimes   History of chronic back pain noted  Discussed will start with XR knee  Otherwise feeling well, cough improved  No lightheadedness with standing, with coughing very hard will get lightheadedness with this  Cough now starting to clear mucous  Breathing much better  No chest pains  CHACON improved  The following portions of the patient's history were reviewed and updated as appropriate: allergies, current medications, past family history, past medical history, past social history, past surgical history, and problem list     Review of Systems   All other systems reviewed and are negative  Objective:      /56   Pulse 68   Temp 97 5 °F (36 4 °C) (Tympanic)   Resp 18   Ht 5' 8" (1 727 m)   Wt 93 9 kg (207 lb)   SpO2 94%   BMI 31 47 kg/m²          Physical Exam  Vitals reviewed  Constitutional:       General: He is not in acute distress  Appearance: Normal appearance  He is not ill-appearing, toxic-appearing or diaphoretic  HENT:      Head: Normocephalic and atraumatic  Eyes:      General:         Right eye: No discharge  Left eye: No discharge  Extraocular Movements: Extraocular movements intact  Conjunctiva/sclera: Conjunctivae normal    Cardiovascular:      Rate and Rhythm: Normal rate and regular rhythm  Heart sounds: No murmur heard  No friction rub  No gallop  Comments: Occasional skipped beats   Pulmonary:      Effort: Pulmonary effort is normal  No respiratory distress  Breath sounds: No stridor  No wheezing or rhonchi  Comments: Diminished breath sounds, crackles bilaterally  Musculoskeletal:         General: No swelling, tenderness or signs of injury  Right knee: No swelling or bony tenderness  Normal range of motion  No tenderness  No LCL laxity, MCL laxity, ACL laxity or PCL laxity  Normal alignment  Skin:     General: Skin is warm  Coloration: Skin is not pale  Findings: No erythema or rash     Neurological:      Mental Status: He is alert and oriented to person, place, and time  Motor: No weakness     Psychiatric:         Mood and Affect: Mood normal          Behavior: Behavior normal              DO Kaleb Barney [address] [place] [address] [facility]

## 2022-07-11 ENCOUNTER — HOSPITAL ENCOUNTER (OUTPATIENT)
Dept: RADIOLOGY | Age: 77
Discharge: HOME | End: 2022-07-11
Payer: COMMERCIAL

## 2022-07-11 DIAGNOSIS — R97.20 ELEVATED PSA: ICD-10-CM

## 2022-07-11 PROCEDURE — 76377 3D RENDER W/INTRP POSTPROCES: CPT

## 2022-07-11 PROCEDURE — G1004 CDSM NDSC: HCPCS

## 2022-07-11 PROCEDURE — 72197 MRI PELVIS W/O & W/DYE: CPT

## 2022-07-11 PROCEDURE — A9585 GADOBUTROL INJECTION: HCPCS | Performed by: UROLOGY

## 2022-07-11 RX ADMIN — GADOBUTROL 9 ML: 604.72 INJECTION INTRAVENOUS at 14:05

## 2022-07-12 ENCOUNTER — OFFICE VISIT (OUTPATIENT)
Dept: CARDIOLOGY CLINIC | Facility: CLINIC | Age: 77
End: 2022-07-12
Payer: COMMERCIAL

## 2022-07-12 VITALS
HEART RATE: 74 BPM | BODY MASS INDEX: 31.67 KG/M2 | SYSTOLIC BLOOD PRESSURE: 102 MMHG | DIASTOLIC BLOOD PRESSURE: 50 MMHG | WEIGHT: 209 LBS | HEIGHT: 68 IN

## 2022-07-12 DIAGNOSIS — I10 PRIMARY HYPERTENSION: Primary | ICD-10-CM

## 2022-07-12 DIAGNOSIS — I49.3 PVC (PREMATURE VENTRICULAR CONTRACTION): ICD-10-CM

## 2022-07-12 DIAGNOSIS — I35.0 AORTIC VALVE STENOSIS, ETIOLOGY OF CARDIAC VALVE DISEASE UNSPECIFIED: ICD-10-CM

## 2022-07-12 DIAGNOSIS — I25.9 CHRONIC ISCHEMIC HEART DISEASE: ICD-10-CM

## 2022-07-12 DIAGNOSIS — I34.0 MITRAL VALVE INSUFFICIENCY, UNSPECIFIED ETIOLOGY: ICD-10-CM

## 2022-07-12 DIAGNOSIS — E78.2 MIXED HYPERLIPIDEMIA: ICD-10-CM

## 2022-07-12 PROCEDURE — 99214 OFFICE O/P EST MOD 30 MIN: CPT | Performed by: INTERNAL MEDICINE

## 2022-07-12 NOTE — PROGRESS NOTES
Cardiology Follow up    Rochelle Castillo  [id number]  1945  [facility] [address] [id number]      1  Primary hypertension     2  Chronic ischemic heart disease  Echo follow up/limited w/ contrast if indicated   3  Mixed hyperlipidemia     4  Aortic valve stenosis, etiology of cardiac valve disease unspecified  Echo follow up/limited w/ contrast if indicated   5  Mitral valve insufficiency, unspecified etiology  Echo follow up/limited w/ contrast if indicated   6  PVC (premature ventricular contraction)  Echo follow up/limited w/ contrast if indicated       Discussion/Summary:  1  Coronary artery disease with PCI and recent cardiac catheterization 03/18/2021 with stable disease without need for revascularization  2  Heart failure with reduced ejection fraction LVEF 40-45% possibly mixed picture with frequent PVCs and known CAD  3  Mild aortic stenosis with mild-to-moderate aortic regurgitation  4  Mild-to-moderate mitral regurgitation  5  Hypertension  6  Hyperlipidemia  7   Frequent PVCs    -24 hour Holter monitor 03/18/2022 showing predominantly sinus rhythm with average heart rate 73 beats per minute with occasional PVCs accounting for 4 8% of total monitored beats with no sustained ventricular tachycardia present  -will continue current medical therapy with aspirin 81 mg daily, furosemide 40 mg daily, metoprolol succinate 75 mg in the morning and 50 mg in the evening with potassium 20 mEq daily (10 mEq in the morning and 10 mEq in the evening) along with Crestor 40 mg daily  -patient counseled on dietary lifestyle modifications including improvement in sodium and fluid restriction to less than 2000 mg sodium daily less than 1800 mL of fluid daily  -will see patient in 3 months or sooner if necessary  -patient counseled if he were to have any warning or alarm type symptoms he is to seek emergency medical care immediately  -lipid panel 04/18/2022 showing total cholesterol 110, triglyceride 62, HDL 53, LDL 45 which is at goal  -will repeat limited transthoracic echocardiogram prior to next office visit to monitor LVEF with up titration of medical therapy  History of Present Illness:  - patient is a 74-year-old male with hypertension, hyperlipidemia, obesity, COPD with intermittent/occasional oxygen use on chronic oral antibiotics, obstructive sleep apnea compliant with CPAP therapy, known coronary artery disease with MI in 1995 with PCI to RCA with stenting in 1999 in in 2001 involving RCA and then in 2003 with stenting to LAD with cardiac catheterization from March 2021 showing no significant disease in left main with proximal LAD 10% stenosis at site of prior stent left circumflex artery with minimal luminal irregularities and mid RCA with 50% stenosis at site of prior stent along with the posterior lateral segments with distal disease supplied by collaterals from the distal circumflex and no significant coronary disease requiring revascularization at that time  Patient was also found to have reduced LV systolic function with significant PVC burden and was also seen by electrophysiology however in the setting of chronic antibiotic therapy was recommended the patient if able to tolerate uptitration of beta-blocker therapy try this prior to transitioning to alternative medical therapy and presents to the office today for scheduled follow-up  -currently in the office he denies any chest pain, palpitations, lightheadedness or dizziness, loss of consciousness or shortness of breath  He states that intermittently he does have some lower extremity edema but this is only at the very end of the day after being on his feet all day and it relieves with elevation at night    He also notes some intermittent use of oxygen however does not necessarily need this consistently throughout the day    -patient notes good control blood pressures at home predominantly in the 877 mmHg systolic range over 70 mmHg diastolic range  He notes that overall he is doing reasonably well he is able to be active even walking [place] without his oxygen on and denies any orthopnea      Patient Active Problem List   Diagnosis    Hyperlipidemia    Coronary artery disease involving native coronary artery of native heart without angina pectoris    Bilateral carotid artery stenosis    Hypertension    Oxygen dependent    Depression    COPD exacerbation ([facility])    S/P carotid endarterectomy    Stented coronary artery    Vertigo    Anisometropia    Osteoarthritis of spinal facet joint    Chronic hypokalemia    Chronic ischemic heart disease    Chronic diastolic (congestive) heart failure ([facility])    Diverticular disease of colon    Dry eye syndrome    Gastroesophageal reflux disease    Hearing loss    Elevated PSA    Hyperglycemia    Hypoxemia    Lens replaced by other means    Lumbar radiculopathy    Multinodular goiter    Nuclear senile cataract    Obesity    Occlusion and stenosis of carotid artery    Osteoarthritis of hip    Prediabetes    Psychosexual dysfunction with inhibited sexual excitement    Sleep apnea    Solitary pulmonary nodule    Thyroid nodule    Visual disturbance    Generalized abdominal pain    Cubital tunnel syndrome on left    Costochondral chest pain    Intermittent chest pain    Abnormal nuclear stress test    Discoloration of skin    Right flank pain    Right hip pain    Primary insomnia    Chronic right-sided thoracic back pain    Abdominal bloating    Stage 3a chronic kidney disease (HCC)    Hoarseness or changing voice    Chronic bilateral low back pain with right-sided sciatica    Mid back pain    Thoracic radiculopathy    Chronic pain syndrome    Cough    Lightheadedness    Urinary frequency    Incomplete bladder emptying    Intercostal neuralgia     Past Medical History:   Diagnosis Date    Bilateral carotid artery stenosis     Chronic diastolic heart failure (HCC)     Chronic ischemic heart disease     COPD (chronic obstructive pulmonary disease) (HCC)     Coronary artery disease     hx stents, MI, PCI    Hearing loss     Hyperlipidemia     Hypertension     [facility] infarction) ([facility] [address] )     S/P carotid endarterectomy     Sleep apnea     Sleep apnea, obstructive     Stented coronary artery      Social History     Socioeconomic History    Marital status: /Civil Union     Spouse name: Not on file    Number of children: Not on file    Years of education: Not on file    Highest education level: Not on file   Occupational History    Not on file   Tobacco Use    Smoking status: Former Smoker     Quit date:      Years since quittin 5    Smokeless tobacco: Never Used   Vaping Use    Vaping Use: Never used   Substance and Sexual Activity    Alcohol use: Not Currently    Drug use: Never    Sexual activity: Not on file   Other Topics Concern    Not on file   Social History Narrative    Not on file     Social Determinants of Health     Financial Resource Strain: Not on file   Food Insecurity: Not on file   Transportation Needs: Not on file   Physical Activity: Not on file   Stress: Not on file   Social Connections: Not on file   Intimate Partner Violence: Not on file   Housing Stability: Not on file      Family History   Problem Relation Age of Onset    Heart attack Mother     Dementia Mother      Past Surgical History:   Procedure Laterality Date    CARDIAC CATHETERIZATION  2021    left main with no significant disease, proximal LAD with 10% stenosis at the site of prior stent, left circumflex artery with minimal luminal irregularities mid RCA with 50% stenosis at site of prior stent and PL segment with distal disease supplied by collaterals from the distal circumflex with no significant CAD requiring revascularization at that time      COLONOSCOPY      CORONARY ANGIOPLASTY WITH STENT PLACEMENT  1999    RCA    CORONARY ANGIOPLASTY WITH STENT PLACEMENT  2001    RCA    CORONARY ANGIOPLASTY WITH STENT PLACEMENT  2003    LAD    EYE SURGERY      SKIN CANCER EXCISION      arm       Current Outpatient Medications:     albuterol (2 5 mg/3 mL) 0 083 % nebulizer solution, Take 3 mL (2 5 mg total) by nebulization every 6 (six) hours as needed for wheezing or shortness of breath, Disp: 60 mL, Rfl: 1    Ascorbic Acid (vitamin C) 1000 MG tablet, Take 1,000 mg by mouth daily (Patient not taking: No sig reported), Disp: , Rfl:     aspirin 81 mg chewable tablet, CHEW ONE TABLET BY MOUTH EVERY DAY, Disp: , Rfl:     azithromycin (ZITHROMAX) 250 mg tablet, Take 1 every Monday, Wednesday and Friday , Disp: 45 tablet, Rfl: 3    Blood Pressure Monitor KIT, Use daily, Disp: 1 kit, Rfl: 0    fluticasone (FLONASE) 50 mcg/act nasal spray, into each nostril as needed , Disp: , Rfl:     fluticasone-salmeterol (Advair) 500-50 mcg/dose inhaler, Inhale 1 puff in the morning Rinse mouth after use , Disp: , Rfl:     furosemide (LASIX) 40 mg tablet, Take 1 tablet (40 mg total) by mouth daily, Disp: 100 tablet, Rfl: 3    gabapentin (NEURONTIN) 100 mg capsule, 1 PO QHS x 1 day, then 1 PO BID x 1 day, then 1 PO TID, Disp: 90 capsule, Rfl: 1    methocarbamol (ROBAXIN) 500 mg tablet, Take 1 tablet (500 mg total) by mouth 3 (three) times a day as needed for muscle spasms, Disp: 30 tablet, Rfl: 0    metoprolol succinate (TOPROL-XL) 25 mg 24 hr tablet, Take 1 tablet (25 mg total) by mouth daily, Disp: 30 tablet, Rfl: 3    metoprolol succinate (TOPROL-XL) 50 mg 24 hr tablet, Take 1 tablet (50 mg total) by mouth 2 (two) times a day, Disp: 180 tablet, Rfl: 3    Multiple Vitamin (multivitamin) tablet, Take 1 tablet by mouth daily (Patient not taking: Reported on 6/2/2022), Disp: , Rfl:     nitroglycerin (NITROSTAT) 0 4 mg SL tablet, DISSOLVE ONE TABLET UNDER THE TONGUE  PRN, Disp: , Rfl:     potassium chloride (Klor-Con) 10 mEq tablet, Take 2 tablets (20 mEq total) by mouth daily, Disp: 200 tablet, Rfl: 3    rosuvastatin (CRESTOR) 40 MG tablet, Take 1 tablet (40 mg total) by mouth daily, Disp: 100 tablet, Rfl: 3    Tiotropium Bromide Monohydrate (SPIRIVA RESPIMAT IN), Inhale, Disp: , Rfl:   Allergies   Allergen Reactions    Lisinopril Swelling and Cough    Tetanus Antitoxin Anaphylaxis    Tetanus Toxoid Anaphylaxis and Swelling         Labs:  Appointment on 06/02/2022   Component Date Value    PSA 06/02/2022 12 8 (A)        Imaging: No results found  Review of Systems:  Review of Systems   Constitutional: Negative for chills, diaphoresis, fatigue and fever  HENT: Negative for trouble swallowing and voice change  Eyes: Negative for pain and redness  Respiratory: Negative for shortness of breath and wheezing  Cardiovascular: Negative for chest pain, palpitations and leg swelling  Gastrointestinal: Negative for abdominal pain, constipation, diarrhea, nausea and vomiting  Genitourinary: Negative for dysuria  Musculoskeletal: Positive for arthralgias  Negative for neck pain and neck stiffness  Skin: Negative for rash  Neurological: Negative for dizziness, syncope, light-headedness and headaches  Psychiatric/Behavioral: Negative for agitation and confusion  All other systems reviewed and are negative  Vitals:    07/12/22 1429   BP: 102/50   Pulse: 74   Weight: 94 8 kg (209 lb)   Height: 5' 8" (1 727 m)     Vitals:    07/12/22 1429   Weight: 94 8 kg (209 lb)     Height: 5' 8" (172 7 cm)     Physical Exam:  Physical Exam  Vitals reviewed  Constitutional:       General: He is not in acute distress  Appearance: He is obese  He is not diaphoretic  HENT:      Head: Normocephalic and atraumatic  Eyes:      General:         Right eye: No discharge  Left eye: No discharge     Neck: Comments: Trachea midline, no JVD present  Cardiovascular:      Rate and Rhythm: Normal rate and regular rhythm  Heart sounds: No friction rub  Pulmonary:      Effort: Pulmonary effort is normal  No respiratory distress  Breath sounds: Rhonchi (Slight rhonchi heard bilaterally) present  No wheezing  Abdominal:      General: Bowel sounds are normal       Palpations: Abdomen is soft  Tenderness: There is no abdominal tenderness  There is no rebound  Musculoskeletal:      Right lower leg: No edema  Left lower leg: No edema  Skin:     General: Skin is warm and dry  Neurological:      Mental Status: He is alert  Comments: Awake, alert, able to answer questions appropriately, able move extremities bilaterally     Psychiatric:         Mood and Affect: Mood normal          Behavior: Behavior normal

## 2022-07-14 ENCOUNTER — OFFICE VISIT (OUTPATIENT)
Dept: FAMILY MEDICINE CLINIC | Facility: CLINIC | Age: 77
End: 2022-07-14
Payer: COMMERCIAL

## 2022-07-14 VITALS
WEIGHT: 206 LBS | BODY MASS INDEX: 31.22 KG/M2 | HEIGHT: 68 IN | OXYGEN SATURATION: 97 % | SYSTOLIC BLOOD PRESSURE: 104 MMHG | TEMPERATURE: 98 F | HEART RATE: 98 BPM | RESPIRATION RATE: 20 BRPM | DIASTOLIC BLOOD PRESSURE: 68 MMHG

## 2022-07-14 DIAGNOSIS — I50.32 CHRONIC DIASTOLIC (CONGESTIVE) HEART FAILURE (HCC): ICD-10-CM

## 2022-07-14 DIAGNOSIS — R07.89 COSTOCHONDRAL CHEST PAIN: ICD-10-CM

## 2022-07-14 DIAGNOSIS — I10 ESSENTIAL HYPERTENSION: ICD-10-CM

## 2022-07-14 DIAGNOSIS — I49.3 PVC (PREMATURE VENTRICULAR CONTRACTION): ICD-10-CM

## 2022-07-14 DIAGNOSIS — I25.10 CORONARY ARTERY DISEASE INVOLVING NATIVE CORONARY ARTERY OF NATIVE HEART WITHOUT ANGINA PECTORIS: Primary | ICD-10-CM

## 2022-07-14 DIAGNOSIS — J44.9 CHRONIC OBSTRUCTIVE PULMONARY DISEASE, UNSPECIFIED COPD TYPE (HCC): ICD-10-CM

## 2022-07-14 DIAGNOSIS — R26.2 AMBULATORY DYSFUNCTION: ICD-10-CM

## 2022-07-14 PROCEDURE — 3074F SYST BP LT 130 MM HG: CPT | Performed by: FAMILY MEDICINE

## 2022-07-14 PROCEDURE — 99214 OFFICE O/P EST MOD 30 MIN: CPT | Performed by: FAMILY MEDICINE

## 2022-07-14 PROCEDURE — 1160F RVW MEDS BY RX/DR IN RCRD: CPT | Performed by: FAMILY MEDICINE

## 2022-07-14 PROCEDURE — 3078F DIAST BP <80 MM HG: CPT | Performed by: FAMILY MEDICINE

## 2022-07-14 RX ORDER — METOPROLOL SUCCINATE 25 MG/1
25 TABLET, EXTENDED RELEASE ORAL DAILY
Qty: 90 TABLET | Refills: 3 | Status: SHIPPED | OUTPATIENT
Start: 2022-07-14

## 2022-07-14 NOTE — PROGRESS NOTES
Assessment/Plan:       Problem List Items Addressed This Visit        Respiratory    Chronic obstructive pulmonary disease (COPD) ([facility] [address] )       Cardiovascular and Mediastinum    Coronary artery disease involving native coronary artery of native heart without angina pectoris - Primary    Relevant Medications    metoprolol succinate (TOPROL-XL) 25 mg 24 hr tablet    Chronic diastolic (congestive) heart failure (HCC)    Relevant Medications    metoprolol succinate (TOPROL-XL) 25 mg 24 hr tablet       Other    Costochondral chest pain      Other Visit Diagnoses     Essential hypertension        Relevant Medications    metoprolol succinate (TOPROL-XL) 25 mg 24 hr tablet    PVC (premature ventricular contraction)        Relevant Medications    metoprolol succinate (TOPROL-XL) 25 mg 24 hr tablet    Ambulatory dysfunction        Relevant Orders    Ambulatory Referral to Physical Therapy            Subjective:      Patient ID: Jean Alva is a 65 y o  male  HPI     CAD, CHF, HTN and PVC- He met with cardiology this week, doing well from their standpoint  Plan for ECHO prior to next appointment to assess LVEF  Once in great while lightheaded with standing, usually when hungry  Feeling well today  COPD- Fatigued, breathing has been up and down  Chronic cough continued as well  Following with pulmonology  Continued pain along right ribs, little relief from gabapentin, would like to try tapering off given no relief  Pain with coughing/position change  Following pain management, possible plan for intercostal nerve block  He has a follow-up with them shortly  He complains of continued intermittent lower extremity weakness and instability, off balance and fear of falling, feels deconditioned  We discussed meeting with physical therapy for balance training and fall prevention exercises, he will look into this once other appointments are completed  Referral placed       The following portions of the patient's history were reviewed and updated as appropriate: allergies, current medications, past family history, past medical history, past social history, past surgical history, and problem list     Review of Systems   All other systems reviewed and are negative  Objective:      /68   Pulse 98   Temp 98 °F (36 7 °C) (Tympanic)   Resp 20   Ht 5' 8" (1 727 m)   Wt 93 4 kg (206 lb)   SpO2 97%   BMI 31 32 kg/m²          Physical Exam  Vitals reviewed  Constitutional:       General: He is not in acute distress  Appearance: Normal appearance  He is not ill-appearing, toxic-appearing or diaphoretic  HENT:      Head: Normocephalic and atraumatic  Eyes:      General:         Right eye: No discharge  Left eye: No discharge  Extraocular Movements: Extraocular movements intact  Conjunctiva/sclera: Conjunctivae normal    Cardiovascular:      Rate and Rhythm: Normal rate and regular rhythm  Heart sounds: Normal heart sounds  No murmur heard  No friction rub  No gallop  Comments: Occasional skipped beats   Pulmonary:      Effort: Pulmonary effort is normal  No respiratory distress  Breath sounds: No stridor  No wheezing or rhonchi  Comments: Diminished breath sounds at bases, trace crackles   Musculoskeletal:         General: No swelling, tenderness or signs of injury  Right lower leg: No edema  Left lower leg: No edema  Skin:     General: Skin is warm  Coloration: Skin is not pale  Findings: No erythema or rash  Neurological:      Mental Status: He is alert and oriented to person, place, and time  Motor: No weakness        Comments: LE strength 5/5   Psychiatric:         Mood and Affect: Mood normal          Behavior: Behavior normal              DO Kaleb Casillas [address] [place] [address] Primary Care

## 2022-07-15 ENCOUNTER — TELEPHONE (OUTPATIENT)
Dept: UROLOGY | Facility: CLINIC | Age: 77
End: 2022-07-15

## 2022-07-15 ENCOUNTER — TELEPHONE (OUTPATIENT)
Dept: UROLOGY | Facility: MEDICAL CENTER | Age: 77
End: 2022-07-15

## 2022-07-15 DIAGNOSIS — J44.1 COPD EXACERBATION (HCC): ICD-10-CM

## 2022-07-15 DIAGNOSIS — Z99.81 OXYGEN DEPENDENT: ICD-10-CM

## 2022-07-15 DIAGNOSIS — R97.20 ELEVATED PSA: Primary | ICD-10-CM

## 2022-07-15 DIAGNOSIS — I25.10 CORONARY ARTERY DISEASE INVOLVING NATIVE CORONARY ARTERY OF NATIVE HEART WITHOUT ANGINA PECTORIS: ICD-10-CM

## 2022-07-15 NOTE — TELEPHONE ENCOUNTER
I reviewed the multiparametric MRI results with the patient  He understands the PI-RADS 4 lesion  He understands the potential risk and implication  We discussed the option of transperineal fusion biopsy done under light sedation  Patient does have significant cardiac and pulmonary issues  Prior to anesthetic sedation, I would want the patient cleared by the preoperative optimization team   Will request this consultation in advance of his procedure

## 2022-07-16 PROBLEM — Q87.89: Status: ACTIVE | Noted: 2021-01-11

## 2022-07-16 PROBLEM — H90.5: Status: ACTIVE | Noted: 2021-01-11

## 2022-07-16 PROBLEM — H90.5: Status: RESOLVED | Noted: 2021-01-11 | Resolved: 2022-07-16

## 2022-07-16 PROBLEM — H18.519: Status: ACTIVE | Noted: 2021-01-11

## 2022-07-16 PROBLEM — H18.519: Status: RESOLVED | Noted: 2021-01-11 | Resolved: 2022-07-16

## 2022-07-16 PROBLEM — Q87.89: Status: RESOLVED | Noted: 2021-01-11 | Resolved: 2022-07-16

## 2022-07-19 ENCOUNTER — ANESTHESIA EVENT (OUTPATIENT)
Dept: PERIOP | Facility: HOSPITAL | Age: 77
End: 2022-07-19
Payer: COMMERCIAL

## 2022-07-19 ENCOUNTER — TELEPHONE (OUTPATIENT)
Dept: OBGYN CLINIC | Facility: HOSPITAL | Age: 77
End: 2022-07-19

## 2022-07-19 RX ORDER — FAMOTIDINE 20 MG/1
20 TABLET, FILM COATED ORAL
COMMUNITY

## 2022-07-19 NOTE — TELEPHONE ENCOUNTER
[facility] in [facility] calling in wondering if patient is supposed to complete physical therapy or occupational therapy after his surgery on 7/22  They also want to know if he can come in Tuesday post-op instead of Monday due to the occupational therapist not being in on Monday  Please advise       zach # [phone] Chana Macias

## 2022-07-20 ENCOUNTER — OFFICE VISIT (OUTPATIENT)
Dept: PAIN MEDICINE | Facility: CLINIC | Age: 77
End: 2022-07-20
Payer: COMMERCIAL

## 2022-07-20 VITALS
WEIGHT: 206 LBS | BODY MASS INDEX: 31.22 KG/M2 | DIASTOLIC BLOOD PRESSURE: 55 MMHG | HEIGHT: 68 IN | SYSTOLIC BLOOD PRESSURE: 100 MMHG | HEART RATE: 73 BPM

## 2022-07-20 DIAGNOSIS — M54.6 CHRONIC RIGHT-SIDED THORACIC BACK PAIN: ICD-10-CM

## 2022-07-20 DIAGNOSIS — G89.29 CHRONIC RIGHT-SIDED THORACIC BACK PAIN: ICD-10-CM

## 2022-07-20 DIAGNOSIS — G89.4 CHRONIC PAIN SYNDROME: ICD-10-CM

## 2022-07-20 DIAGNOSIS — G58.8 INTERCOSTAL NEURALGIA: ICD-10-CM

## 2022-07-20 PROCEDURE — 1160F RVW MEDS BY RX/DR IN RCRD: CPT | Performed by: NURSE PRACTITIONER

## 2022-07-20 PROCEDURE — 99214 OFFICE O/P EST MOD 30 MIN: CPT | Performed by: NURSE PRACTITIONER

## 2022-07-20 RX ORDER — GABAPENTIN 300 MG/1
CAPSULE ORAL
Qty: 90 CAPSULE | Refills: 1 | Status: SHIPPED | OUTPATIENT
Start: 2022-07-20 | End: 2022-09-20 | Stop reason: SDUPTHER

## 2022-07-20 NOTE — TELEPHONE ENCOUNTER
I spoke to the patient and scheduled his procedure for 9/13/2022 at [facility] with Dr Oniel Barrios     -instructions given verbally and mailed  -patient aware to be NPO, needs a  and use an enema 1 hour prior to leaving the house morning of procedure  -patient aware to avoid any potentially blood thinning medications 7 days prior  -CBC, CMP, Urine C&S  2 weeks prior  -[employer] [facility] - on auth tracker 7/20/2022

## 2022-07-20 NOTE — PROGRESS NOTES
Assessment:  1  Chronic pain syndrome    2  Chronic right-sided thoracic back pain    3  Intercostal neuralgia        Plan:  While the patient was in the office today, I did have a thorough conversation regarding their chronic pain syndrome, medication management, and treatment plan options  Patient is being seen for follow-up visit  He was last seen here on 05/27/2022 at which time gabapentin was started and titrated to 100 mg 3 times daily  Unfortunately, he denies any improvement with gabapentin  He denies any side effects from it  Will plan to increase gabapentin, titrating to 300 mg 3 times daily  New prescription was sent to his pharmacy  Patient will be scheduled for right-sided ultrasound-guided intercostal nerve block in the near future  Complete risks and benefits including bleeding, infection, tissue reaction, nerve injury and allergic reaction were discussed  The approach was demonstrated using models and literature was provided  Verbal and written consent was obtained  Follow-up 1 month after the injection  History of Present Illness: The patient is a 65 y o  male who presents for a follow up office visit in regards to Back Pain and Neck Pain  The patients current symptoms include complaints of thoracic pain, right-sided chest wall pain  Current pain level is a 5/10  Quality pain is described as sharp and shooting  Current pain medications includes:  Gabapentin 100 mg 3 times daily   The patient reports that this regimen is providing 0 % pain relief  The patient is reporting no side effects from this pain medication regimen  I have personally reviewed and/or updated the patient's past medical history, past surgical history, family history, social history, current medications, allergies, and vital signs today  Review of Systems  Review of Systems   Constitutional: Negative for chills and fever  HENT: Negative for ear pain and sore throat      Eyes: Negative for pain and visual disturbance  Respiratory: Negative for cough and shortness of breath  Cardiovascular: Negative for chest pain and palpitations  Gastrointestinal: Negative for abdominal pain and vomiting  Genitourinary: Negative for dysuria and hematuria  Musculoskeletal: Positive for myalgias  Negative for arthralgias and back pain  Decreased range of motion  Joint stiffness     Skin: Negative for color change and rash  Neurological: Negative for seizures and syncope  All other systems reviewed and are negative  Past Medical History:   Diagnosis Date    Bilateral carotid artery stenosis     Chronic diastolic heart failure (HCC)     Chronic ischemic heart disease     Colon polyp     COPD (chronic obstructive pulmonary disease) (HCC)     Coronary artery disease     hx stents, MI, PCI    CPAP (continuous positive airway pressure) dependence     Hearing loss     Hyperlipidemia     Hypertension     MI [facility]ocardial infarction) ([facility] [address] ) 1995    Myocardial infarction ([facility] [address] )     Pneumonia     S/P carotid endarterectomy     Shortness of breath     Sleep apnea     Sleep apnea, obstructive     Stented coronary artery        Past Surgical History:   Procedure Laterality Date    APPENDECTOMY      CARDIAC CATHETERIZATION  03/18/2021    left main with no significant disease, proximal LAD with 10% stenosis at the site of prior stent, left circumflex artery with minimal luminal irregularities mid RCA with 50% stenosis at site of prior stent and PL segment with distal disease supplied by collaterals from the distal circumflex with no significant CAD requiring revascularization at that time      COLONOSCOPY      CORONARY ANGIOPLASTY WITH STENT PLACEMENT  1999    RCA    CORONARY ANGIOPLASTY WITH STENT PLACEMENT  2001    RCA    CORONARY ANGIOPLASTY WITH STENT PLACEMENT  2003    LAD    EYE SURGERY      SKIN CANCER EXCISION  2012    chin-per pt, basal cell       Family History   Problem Relation Age of Onset    Heart attack Mother     Dementia Mother        Social History     Occupational History    Not on file   Tobacco Use    Smoking status: Former Smoker     Quit date:      Years since quittin 5    Smokeless tobacco: Never Used   Vaping Use    Vaping Use: Never used   Substance and Sexual Activity    Alcohol use: Not Currently    Drug use: Never    Sexual activity: Not on file         Current Outpatient Medications:     albuterol (2 5 mg/3 mL) 0 083 % nebulizer solution, Take 3 mL (2 5 mg total) by nebulization every 6 (six) hours as needed for wheezing or shortness of breath, Disp: 60 mL, Rfl: 1    aspirin 81 mg chewable tablet, CHEW ONE TABLET BY MOUTH EVERY DAY, Disp: , Rfl:     azithromycin (ZITHROMAX) 250 mg tablet, Take 1 every Monday, Wednesday and Friday   (Patient taking differently: 250 mg Take 1 every Monday, Wednesday and Friday ), Disp: 45 tablet, Rfl: 3    Blood Pressure Monitor KIT, Use daily, Disp: 1 kit, Rfl: 0    famotidine (PEPCID) 20 mg tablet, Take 20 mg by mouth daily at bedtime, Disp: , Rfl:     fluticasone (FLONASE) 50 mcg/act nasal spray, into each nostril as needed , Disp: , Rfl:     fluticasone-salmeterol (Advair) 500-50 mcg/dose inhaler, Inhale 1 puff in the morning Rinse mouth after use , Disp: , Rfl:     furosemide (LASIX) 40 mg tablet, Take 1 tablet (40 mg total) by mouth daily, Disp: 100 tablet, Rfl: 3    gabapentin (NEURONTIN) 300 mg capsule, 1 PO QHS x 1 day, then 1 PO BID x 1 day, then 1 PO TID, Disp: 90 capsule, Rfl: 1    methocarbamol (ROBAXIN) 500 mg tablet, Take 1 tablet (500 mg total) by mouth 3 (three) times a day as needed for muscle spasms, Disp: 30 tablet, Rfl: 0    metoprolol succinate (TOPROL-XL) 25 mg 24 hr tablet, Take 1 tablet (25 mg total) by mouth daily (Patient taking differently: Take 75 mg by mouth daily in the early morning), Disp: 90 tablet, Rfl: 3    metoprolol succinate (TOPROL-XL) 50 mg 24 hr tablet, Take 1 tablet (50 mg total) by mouth 2 (two) times a day (Patient taking differently: Take 50 mg by mouth daily at bedtime), Disp: 180 tablet, Rfl: 3    Multiple Vitamin (multivitamin) tablet, Take 1 tablet by mouth daily, Disp: , Rfl:     nitroglycerin (NITROSTAT) 0 4 mg SL tablet, DISSOLVE ONE TABLET UNDER THE TONGUE  PRN, Disp: , Rfl:     potassium chloride (Klor-Con) 10 mEq tablet, Take 2 tablets (20 mEq total) by mouth daily, Disp: 200 tablet, Rfl: 3    rosuvastatin (CRESTOR) 40 MG tablet, Take 1 tablet (40 mg total) by mouth daily (Patient taking differently: Take 40 mg by mouth daily at bedtime), Disp: 100 tablet, Rfl: 3    Tiotropium Bromide Monohydrate (SPIRIVA RESPIMAT IN), Inhale every morning, Disp: , Rfl:     Ascorbic Acid (vitamin C) 1000 MG tablet, Take 1,000 mg by mouth daily (Patient not taking: No sig reported), Disp: , Rfl:     Allergies   Allergen Reactions    Lisinopril Swelling and Cough    Tetanus Antitoxin Anaphylaxis    Tetanus Toxoid Anaphylaxis and Swelling       Physical Exam:    /55   Pulse 73   Ht 5' 8" (1 727 m)   Wt 93 4 kg (206 lb)   BMI 31 32 kg/m²     Constitutional:normal, well developed, well nourished, alert, in no distress and non-toxic and no overt pain behavior  Eyes:anicteric  HEENT:grossly intact  Neck:supple, symmetric, trachea midline and no masses   Pulmonary:even and unlabored  Cardiovascular:No edema or pitting edema present  Skin:Normal without rashes or lesions and well hydrated  Psychiatric:Mood and affect appropriate  Neurologic:Cranial Nerves II-XII grossly intact  Musculoskeletal:There is tenderness to palpation over the lower right ribcage in the posterior, lateral and anterior aspect  There is increased sensitivity to light touch in the T8-9 and T 910 dermatome  Imaging  No orders to display       No orders of the defined types were placed in this encounter

## 2022-07-21 ENCOUNTER — TELEPHONE (OUTPATIENT)
Dept: OBGYN CLINIC | Facility: HOSPITAL | Age: 77
End: 2022-07-21

## 2022-07-21 DIAGNOSIS — G56.22 CUBITAL TUNNEL SYNDROME ON LEFT: Primary | ICD-10-CM

## 2022-07-21 DIAGNOSIS — G56.02 CARPAL TUNNEL SYNDROME ON LEFT: ICD-10-CM

## 2022-07-22 ENCOUNTER — HOSPITAL ENCOUNTER (OUTPATIENT)
Facility: HOSPITAL | Age: 77
Setting detail: OUTPATIENT SURGERY
Discharge: HOME | End: 2022-07-22
Attending: SURGERY | Admitting: SURGERY
Payer: COMMERCIAL

## 2022-07-22 ENCOUNTER — ANESTHESIA (OUTPATIENT)
Dept: PERIOP | Facility: HOSPITAL | Age: 77
End: 2022-07-22
Payer: COMMERCIAL

## 2022-07-22 VITALS
TEMPERATURE: 96.8 F | BODY MASS INDEX: 31.22 KG/M2 | RESPIRATION RATE: 16 BRPM | SYSTOLIC BLOOD PRESSURE: 116 MMHG | HEART RATE: 73 BPM | OXYGEN SATURATION: 90 % | WEIGHT: 206 LBS | HEIGHT: 68 IN | DIASTOLIC BLOOD PRESSURE: 64 MMHG

## 2022-07-22 DIAGNOSIS — G56.22 CUBITAL TUNNEL SYNDROME ON LEFT: Primary | ICD-10-CM

## 2022-07-22 DIAGNOSIS — G56.02 CARPAL TUNNEL SYNDROME ON LEFT: ICD-10-CM

## 2022-07-22 DIAGNOSIS — Z47.89 AFTERCARE FOLLOWING SURGERY OF THE MUSCULOSKELETAL SYSTEM: ICD-10-CM

## 2022-07-22 PROCEDURE — 64721 CARPAL TUNNEL SURGERY: CPT | Performed by: SURGERY

## 2022-07-22 PROCEDURE — 64721 CARPAL TUNNEL SURGERY: CPT | Performed by: PHYSICIAN ASSISTANT

## 2022-07-22 PROCEDURE — 64719 REVISE ULNAR NERVE AT WRIST: CPT | Performed by: PHYSICIAN ASSISTANT

## 2022-07-22 PROCEDURE — NC001 PR NO CHARGE: Performed by: SURGERY

## 2022-07-22 PROCEDURE — 64719 REVISE ULNAR NERVE AT WRIST: CPT | Performed by: SURGERY

## 2022-07-22 PROCEDURE — 64718 REVISE ULNAR NERVE AT ELBOW: CPT | Performed by: PHYSICIAN ASSISTANT

## 2022-07-22 PROCEDURE — 64718 REVISE ULNAR NERVE AT ELBOW: CPT | Performed by: SURGERY

## 2022-07-22 RX ORDER — SODIUM CHLORIDE, SODIUM LACTATE, POTASSIUM CHLORIDE, CALCIUM CHLORIDE 600; 310; 30; 20 MG/100ML; MG/100ML; MG/100ML; MG/100ML
125 INJECTION, SOLUTION INTRAVENOUS CONTINUOUS
Status: DISCONTINUED | OUTPATIENT
Start: 2022-07-22 | End: 2022-07-22 | Stop reason: HOSPADM

## 2022-07-22 RX ORDER — CEFAZOLIN SODIUM 1 G/3ML
INJECTION, POWDER, FOR SOLUTION INTRAMUSCULAR; INTRAVENOUS AS NEEDED
Status: DISCONTINUED | OUTPATIENT
Start: 2022-07-22 | End: 2022-07-22

## 2022-07-22 RX ORDER — MAGNESIUM HYDROXIDE 1200 MG/15ML
LIQUID ORAL AS NEEDED
Status: DISCONTINUED | OUTPATIENT
Start: 2022-07-22 | End: 2022-07-22 | Stop reason: HOSPADM

## 2022-07-22 RX ORDER — ONDANSETRON 2 MG/ML
INJECTION INTRAMUSCULAR; INTRAVENOUS AS NEEDED
Status: DISCONTINUED | OUTPATIENT
Start: 2022-07-22 | End: 2022-07-22

## 2022-07-22 RX ORDER — CHLORHEXIDINE GLUCONATE 0.12 MG/ML
15 RINSE ORAL ONCE
Status: DISCONTINUED | OUTPATIENT
Start: 2022-07-22 | End: 2022-07-22

## 2022-07-22 RX ORDER — EPHEDRINE SULFATE 50 MG/ML
INJECTION INTRAVENOUS AS NEEDED
Status: DISCONTINUED | OUTPATIENT
Start: 2022-07-22 | End: 2022-07-22

## 2022-07-22 RX ORDER — FENTANYL CITRATE 50 UG/ML
INJECTION, SOLUTION INTRAMUSCULAR; INTRAVENOUS AS NEEDED
Status: DISCONTINUED | OUTPATIENT
Start: 2022-07-22 | End: 2022-07-22

## 2022-07-22 RX ORDER — LIDOCAINE HYDROCHLORIDE 10 MG/ML
0.5 INJECTION, SOLUTION EPIDURAL; INFILTRATION; INTRACAUDAL; PERINEURAL ONCE AS NEEDED
Status: DISCONTINUED | OUTPATIENT
Start: 2022-07-22 | End: 2022-07-22 | Stop reason: HOSPADM

## 2022-07-22 RX ORDER — PROPOFOL 10 MG/ML
INJECTION, EMULSION INTRAVENOUS AS NEEDED
Status: DISCONTINUED | OUTPATIENT
Start: 2022-07-22 | End: 2022-07-22

## 2022-07-22 RX ORDER — KETAMINE HYDROCHLORIDE 50 MG/ML
INJECTION, SOLUTION, CONCENTRATE INTRAMUSCULAR; INTRAVENOUS AS NEEDED
Status: DISCONTINUED | OUTPATIENT
Start: 2022-07-22 | End: 2022-07-22

## 2022-07-22 RX ORDER — DEXAMETHASONE SODIUM PHOSPHATE 10 MG/ML
INJECTION, SOLUTION INTRAMUSCULAR; INTRAVENOUS AS NEEDED
Status: DISCONTINUED | OUTPATIENT
Start: 2022-07-22 | End: 2022-07-22

## 2022-07-22 RX ORDER — FENTANYL CITRATE/PF 50 MCG/ML
25 SYRINGE (ML) INJECTION
Status: DISCONTINUED | OUTPATIENT
Start: 2022-07-22 | End: 2022-07-22 | Stop reason: HOSPADM

## 2022-07-22 RX ORDER — ROPIVACAINE HYDROCHLORIDE 5 MG/ML
INJECTION, SOLUTION EPIDURAL; INFILTRATION; PERINEURAL
Status: COMPLETED | OUTPATIENT
Start: 2022-07-22 | End: 2022-07-22

## 2022-07-22 RX ORDER — SODIUM CHLORIDE, SODIUM LACTATE, POTASSIUM CHLORIDE, CALCIUM CHLORIDE 600; 310; 30; 20 MG/100ML; MG/100ML; MG/100ML; MG/100ML
INJECTION, SOLUTION INTRAVENOUS CONTINUOUS PRN
Status: DISCONTINUED | OUTPATIENT
Start: 2022-07-22 | End: 2022-07-22

## 2022-07-22 RX ORDER — HYDROMORPHONE HCL IN WATER/PF 6 MG/30 ML
0.2 PATIENT CONTROLLED ANALGESIA SYRINGE INTRAVENOUS
Status: COMPLETED | OUTPATIENT
Start: 2022-07-22 | End: 2022-07-22

## 2022-07-22 RX ORDER — HYDROCODONE BITARTRATE AND ACETAMINOPHEN 5; 325 MG/1; MG/1
1 TABLET ORAL EVERY 6 HOURS PRN
Qty: 15 TABLET | Refills: 0 | Status: SHIPPED | OUTPATIENT
Start: 2022-07-22 | End: 2022-08-01

## 2022-07-22 RX ORDER — CEFAZOLIN SODIUM 2 G/50ML
2000 SOLUTION INTRAVENOUS ONCE
Status: DISCONTINUED | OUTPATIENT
Start: 2022-07-22 | End: 2022-07-22 | Stop reason: HOSPADM

## 2022-07-22 RX ORDER — ONDANSETRON 2 MG/ML
4 INJECTION INTRAMUSCULAR; INTRAVENOUS ONCE AS NEEDED
Status: DISCONTINUED | OUTPATIENT
Start: 2022-07-22 | End: 2022-07-22 | Stop reason: HOSPADM

## 2022-07-22 RX ORDER — MIDAZOLAM HYDROCHLORIDE 2 MG/2ML
INJECTION, SOLUTION INTRAMUSCULAR; INTRAVENOUS AS NEEDED
Status: DISCONTINUED | OUTPATIENT
Start: 2022-07-22 | End: 2022-07-22

## 2022-07-22 RX ADMIN — ROPIVACAINE HYDROCHLORIDE 10 ML: 5 INJECTION, SOLUTION EPIDURAL; INFILTRATION; PERINEURAL at 08:40

## 2022-07-22 RX ADMIN — ONDANSETRON 4 MG: 2 INJECTION INTRAMUSCULAR; INTRAVENOUS at 09:19

## 2022-07-22 RX ADMIN — CEFAZOLIN 2000 MG: 1 INJECTION, POWDER, FOR SOLUTION INTRAMUSCULAR; INTRAVENOUS at 09:00

## 2022-07-22 RX ADMIN — DEXAMETHASONE SODIUM PHOSPHATE 10 MG: 10 INJECTION, SOLUTION INTRAMUSCULAR; INTRAVENOUS at 08:58

## 2022-07-22 RX ADMIN — HYDROMORPHONE HYDROCHLORIDE 0.2 MG: 0.2 INJECTION, SOLUTION INTRAMUSCULAR; INTRAVENOUS; SUBCUTANEOUS at 11:09

## 2022-07-22 RX ADMIN — EPHEDRINE SULFATE 15 MG: 50 INJECTION INTRAVENOUS at 09:14

## 2022-07-22 RX ADMIN — SODIUM CHLORIDE, SODIUM LACTATE, POTASSIUM CHLORIDE, AND CALCIUM CHLORIDE 125 ML/HR: .6; .31; .03; .02 INJECTION, SOLUTION INTRAVENOUS at 08:08

## 2022-07-22 RX ADMIN — Medication 25 MCG: at 10:47

## 2022-07-22 RX ADMIN — MIDAZOLAM 1 MG: 1 INJECTION INTRAMUSCULAR; INTRAVENOUS at 08:51

## 2022-07-22 RX ADMIN — HYDROMORPHONE HYDROCHLORIDE 0.2 MG: 0.2 INJECTION, SOLUTION INTRAMUSCULAR; INTRAVENOUS; SUBCUTANEOUS at 11:01

## 2022-07-22 RX ADMIN — EPHEDRINE SULFATE 10 MG: 50 INJECTION INTRAVENOUS at 09:25

## 2022-07-22 RX ADMIN — HYDROMORPHONE HYDROCHLORIDE 0.2 MG: 0.2 INJECTION, SOLUTION INTRAMUSCULAR; INTRAVENOUS; SUBCUTANEOUS at 11:30

## 2022-07-22 RX ADMIN — Medication 25 MCG: at 10:41

## 2022-07-22 RX ADMIN — Medication 25 MCG: at 10:52

## 2022-07-22 RX ADMIN — HYDROMORPHONE HYDROCHLORIDE 0.2 MG: 0.2 INJECTION, SOLUTION INTRAMUSCULAR; INTRAVENOUS; SUBCUTANEOUS at 11:19

## 2022-07-22 RX ADMIN — FENTANYL CITRATE 50 MCG: 50 INJECTION INTRAMUSCULAR; INTRAVENOUS at 09:05

## 2022-07-22 RX ADMIN — PROPOFOL 150 MG: 10 INJECTION, EMULSION INTRAVENOUS at 08:52

## 2022-07-22 RX ADMIN — PROPOFOL 50 MG: 10 INJECTION, EMULSION INTRAVENOUS at 09:10

## 2022-07-22 RX ADMIN — SODIUM CHLORIDE, SODIUM LACTATE, POTASSIUM CHLORIDE, AND CALCIUM CHLORIDE: .6; .31; .03; .02 INJECTION, SOLUTION INTRAVENOUS at 08:30

## 2022-07-22 RX ADMIN — FENTANYL CITRATE 50 MCG: 50 INJECTION INTRAMUSCULAR; INTRAVENOUS at 09:10

## 2022-07-22 RX ADMIN — HYDROMORPHONE HYDROCHLORIDE 0.2 MG: 0.2 INJECTION, SOLUTION INTRAMUSCULAR; INTRAVENOUS; SUBCUTANEOUS at 11:45

## 2022-07-22 NOTE — OP NOTE
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]    Procedure(s):  RELEASE CUBITAL TUNNEL (Left)  RELEASE CARPAL TUNNEL (Left)  GUYON'S CANAL RELEASE (Left)    Specimen(s):  * No orders in the log *    Estimated Blood Loss:   2 mL    Anesthesia Type:   Regional with Sedation    IMPLANTS:  * No implants in log *    PERIOPERATIVE ANTIBIOTICS:    cefazolin, 2 grams    Tourniquet Time:  50 min hemaclear            Operative Indications: The patient has a history of Carpal Tunnel Syndrome  left, Cubital Tunnel Syndrome  left and Guyon's  that was recalcitrant to conservative management  The decision was made to bring the patient to the operating room for Open Carpal Tunnel Release  left, Cubital Tunnel Release  left and left guyon's canal release  Risks of the procedure were explained which include, but are not limited to bleeding; infection; damage to nerves, arteries,veins, tendons; scar; pain; need for reoperation; failure to give desired result; and risks of anaesthesia  All questions were answered to satisfaction and they were willing to proceed  Operative Findings:  Significant hypertrophy of TCL   Compression ulnar nerve FCU fascia   Compression of ulnar nerve in Guyon's canal     Complications:   None    Procedure and Technique:  After the patient, site, and procedure were identified, the patient was brought into the operating room in a supine position  Regional and general anaesthesia were provided   The  left upper extremity was then prepped and drapped in a normal, sterile, orthopedic fashion  A HemaClear tourniquet was applied sterilely    A longitudinal incision was made just radial to the FCU tendon with a zigzag extension crossing the wrist crease and extending into the palm, approximately centimeters in total length  The dissection was carefully carried down through superficial  fat and superficial neurovascular structures were protected  Under loupe magnification the ulnar artery and nerve were identified proximal to the wrist crease  These were carefully dissected out from proximal to distal and compression was noted at zone 1 of Guyon's canal   The overlying fascial band was gently released  The leading edge of the hypo thenar musculature was identified and the associated fascia gently released with the deep motor branch visualized and protected at all times  Once a full decompression of the ulnar nerve at yon's canal was completed inspection demonstrated no significant masses stemming from the pisotriquetral or CMC joints  The ulnar artery and nerve were gently retracted ulnarward be careful to protect the superficial branches to the skin  The transverse carpal ligament was identified overlying the median nerve and released from proximal to distal be careful to protect all of the associated branches  Distally the superficial arch was identified and protected  Keisha Bloodgood was passed in an antegrade fashion making sure that full release was completed  A 6 cm Incision was made with a 15 blade between medial epicondyle and olecranon  A single branch of the MABC was identified distally and protected  The ulnar nerve was identified at the level of the cubital tunnel  The nerve was not found to be subluxed  The Ulnar nerve was traced 1st traced proximally  Proximal release was performed under direct visualization  the overlying fascia was released with a scissor up to the level of the intermuscular septum which was incised  Next dissection was taken proximally both with a scissor and with a 64 blade  Care was taken preserve any crossing branches of the medial antebrachial cutaneous nerve  Decompression was taken around the olecranon, and then to the FCU fascia  The super all facial layer of the fascia was released with a knife and the 2 bellies of the FCU spread gently with a scissor  The deep fascia of the FCU was identified and with the nerve protected all times incised  Release was taken just past the level of the 1st motor branch to the FCU  Once a complete release was verified,  the Elbow was taken through a full range of motion and the nerve was found to be stable with no subluxation  After the release, it was appreciated that the nerve had been significantly constricted at the level of deep FCU fascia with flattening of the nerve  The tourniquet was brought down and hemostasis was obtained  The wound was copiously irrigated and closed in a layered fashion with 3-0 monocryl  for deep dermal       At the completion of the procedure, hemostasis was obtained with cautery and direct pressure  The wounds were copiously irrigated with sterile solution  The wounds were closed with Prolene, Monocryl and Steri-strips  Sterile dressings were applied, including Xeroform, gauze, tweeners, webril, ACE and Sling  Please note, all sponge, needle, and instrument counts were correct prior to closure  Loupe magnification was utilized  The patient tolerated the procedure well       I was present for the entire procedure, A qualified resident physician was not available and A physician assistant was required during the procedure for retraction tissue handling,dissection and suturing    Patient Disposition:  PACU     SIGNATURE: Evelyn Chaney MD  DATE: 07/22/22  TIME: 12:21 PM

## 2022-07-22 NOTE — DISCHARGE INSTRUCTIONS
Post Operative Instructions    You have had surgery on your arm today, please read and follow the information below:  Elevate your hand above your elbow during the next 24-48 hours to help with swelling  Place your hand and arm over your head with motion at your shoulder three times a day  Do not apply any cream/ointment/oil to your incisions including antibiotics  Do not soak your hands in standing water (dishwater, tubs, Jacuzzi's, pools, etc ) until given permission (typically 2-3 weeks after injury)    Call the office if you notice any:  Increased numbness or tingling of your hand or fingers that is not relieved with elevation  Increasing pain that is not controlled with medication  Difficulty chewing, breathing, swallowing  Chest pains or shortness of breath  Fever over 101 4 degrees  Bandage: Please keep bandages clean and dry  Remove bandage after 5 days  Once bandages are removed you may wash hands with soap and water  Short showers are okay as well, but please avoid soaking the hand as described above (ie no pools, baths, dirty dish water, hot tubs, ocean/lake water, etc)  Sutures will be removed in the office at your first follow up visit, please do not remove them yourself  Please do NOT put any topical agents on the surgical wound including neosporin, peroxide, tea tree oil, vitamin E, etc  as these can delay wound healing  Motion: Move fingers into a fist 5 times a day, DO NOT move any splinted fingers  Weight bearing status: Avoid heavy lifting (>5 pounds) with the extremity that was operated on until follow up appointment  Normal activities of daily living are OK  Ice: Ice for 10 minutes every hour as needed for swelling x 24 hours  Sling: No sling necessary      Pain medication:   Naproxen 220 mg two time a day (do not take this medication if you were told by your doctor that you cannot take anti-inflammatories or NSAIDS)  Tylenol Extended Release 650 mg every 8 hours  Norco/Hydrocodone one tab every 6 hours ONLY AS NEEDED for severe pain         Follow-up Appointment: 10-14 days with Dr Gavi Alfaro        Please call the office if you have any questions or concerns regarding your post-operative care

## 2022-07-22 NOTE — PERIOPERATIVE NURSING NOTE
Patient complains of pain in left hand  No pain in other areas  Pt had block complains of heaviness of left arm and numbness and tingling in fingers  No other pain in other surgical sites  Fingers warm and pink with brisk cap refill  Able to move fingers well, good sensation noted to touch

## 2022-07-22 NOTE — ANESTHESIA PREPROCEDURE EVALUATION
Procedure:  RELEASE CUBITAL TUNNEL (Left Elbow)  RELEASE CARPAL TUNNEL (Left Wrist)  GUYON'S CANAL RELEASE (Left Arm Lower)    Relevant Problems   CARDIO   (+) Chronic right-sided thoracic back pain   (+) Coronary artery disease involving native coronary artery of native heart without angina pectoris   (+) Costochondral chest pain   (+) Hyperlipidemia   (+) Hypertension   (+) Intercostal neuralgia   (+) Intermittent chest pain      GI/HEPATIC   (+) Gastroesophageal reflux disease      /RENAL   (+) Stage 3a chronic kidney disease (HCC)      MUSCULOSKELETAL   (+) Chronic bilateral low back pain with right-sided sciatica   (+) Chronic right-sided thoracic back pain   (+) Mid back pain   (+) Osteoarthritis of hip   (+) Osteoarthritis of spinal facet joint      NEURO/PSYCH   (+) Chronic bilateral low back pain with right-sided sciatica   (+) Chronic pain syndrome   (+) Chronic right-sided thoracic back pain   (+) Depression   (+) Visual disturbance      PULMONARY   (+) [facility] obstructive pulmonary disease [facility]D) ([facility])   (+) Oxygen dependent   (+) Sleep apnea        Physical Exam    Airway    Mallampati score: III  TM Distance: >3 FB  Neck ROM: full     Dental   No notable dental hx     Cardiovascular  Cardiovascular exam normal    Pulmonary  Pulmonary exam normal     Other Findings      COPD on home O2 at night  Can walk up flight of stairs but with some difficulty  10 stents, most recent 5 yrs ago  No signs of acute CHF  On lasix and metoprolol  Left Ventricle: Left ventricular cavity size is mildly dilated  Wall thickness is mildly increased  The left ventricular ejection fraction is 40-45%  Systolic function is mild- moderately reduced  There is mild-moderate global hypokinesis  Diastolic function is mildly abnormal, consistent with grade I (abnormal) relaxation    Left Atrium: The atrium is mildly dilated    Aortic Valve: The leaflets are calcified  There is mildly reduced mobility   There is mild to moderate regurgitation  There is mild stenosis    Mitral Valve: There is annular calcification  There is mild to moderate regurgitation    Tricuspid Valve: There is mild regurgitation  Anesthesia Plan  ASA Score- 4     Anesthesia Type- regional with ASA Monitors  Additional Monitors:   Airway Plan:     Comment: Supraclavicular vs infraclavicular block with GA backup discussed  Plan Factors-Exercise tolerance (METS): >4 METS  Chart reviewed  EKG reviewed  Existing labs reviewed  Patient is not a current smoker  Induction- intravenous  Postoperative Plan- Plan for postoperative opioid use  Informed Consent- Anesthetic plan and risks discussed with patient  I personally reviewed this patient with the CRNA  Discussed and agreed on the Anesthesia Plan with the CRNA  Matilde Pleitez

## 2022-07-22 NOTE — ANESTHESIA POSTPROCEDURE EVALUATION
Post-Op Assessment Note    CV Status:  Stable  Pain Score: 0    Pain management: adequate     Mental Status:  Alert and awake   Hydration Status:  Stable and euvolemic   PONV Controlled:  Controlled   Airway Patency:  Patent      Post Op Vitals Reviewed: Yes      Staff: CRNA         No complications documented      BP   145/65   Temp  96 8   Pulse  85   Resp   16   SpO2   100

## 2022-07-22 NOTE — H&P
58-year-old male with left carpal tunnel syndrome, left cubital tunnel syndrome, and left Guyon's canal syndrome  Reviewed EMG with the patient and discussed the above diagnoses along with treatment options  Since he does have persistent numbness and loss of hot cold sensation in the small and ring fingers I do encourage him to consider surgical release at least of the ulnar nerve  We discussed the surgical plan and recovery time  Risks benefits alternatives were discussed and patient was agreeable to proceeding with surgery likely at the end of the summer  Informed consent was signed today  He does have multiple comorbidities and will require preoperative testing  We discussed that small and ring finger will likely never have normal sensation but I do hope he regains at least a little bit  We will see him 10-14 days after surgery for postoperative evaluation and suture removal      The patient verbalized understanding of exam findings and treatment plan  We engaged in the shared decision-making process and treatment options were discussed at length with the patient  Surgical and conservative management discussed today along with risks and benefits      Diagnoses and all orders for this visit:     Guyon syndrome, left  -     Case request operating room: RELEASE CUBITAL TUNNEL, RELEASE CARPAL TUNNEL, [facility]; Standing  -     Basic metabolic panel; Future  -     EKG 12 lead; Future  -     Case request operating room: RELEASE CUBITAL TUNNEL, RELEASE CARPAL TUNNEL, GUYON'S CANAL RELEASE     Cubital tunnel syndrome on left  -     Ambulatory Referral to Hand Surgery  -     Case request operating room: RELEASE CUBITAL TUNNEL, RELEASE CARPAL TUNNEL, GUYON'S CANAL RELEASE; Standing  -     Basic metabolic panel; Future  -     EKG 12 lead;  Future  -     Case request operating room: RELEASE CUBITAL TUNNEL, RELEASE CARPAL TUNNEL, GUYON'S CANAL RELEASE     Carpal tunnel syndrome on left  -     Ambulatory Referral to Hand Surgery  -     Case request operating room: RELEASE CUBITAL TUNNEL, RELEASE CARPAL TUNNEL, GUYON'S CANAL RELEASE; Standing  -     Basic metabolic panel; Future  -     EKG 12 lead; Future  -     Case request operating room: RELEASE CUBITAL TUNNEL, RELEASE CARPAL TUNNEL, GUYON'S CANAL RELEASE     Encounter for pre-operative laboratory testing  -     Basic metabolic panel; Future  -     EKG 12 lead; Future           Follow Up:  Return for After Surgery      To Do Next Visit:  Re-evaluation of current issue        General Discussions:  Carpal Tunnel Syndrome: The anatomy and physiology of carpal tunnel syndrome was discussed with the patient today  Increase pressure localized under the transverse carpal ligament can cause pain, numbness, tingling, or dysesthesias within the median nerve distribution as well as feelings of fatigue, clumsiness, or awkwardness  These symptoms typically occur at night and worse in the morning upon waking  Eventually, untreated carpal tunnel syndrome can result in weakness and permanent loss of muscle within the thenar compartment of the hand  Treatment options were discussed with the patient  Conservative treatment includes nocturnal resting splints to keep the nerve in a neutral position, ergonomic changes within the work or home environment, activity modification, and tendon gliding exercises  Vitamin B6 one tablet daily over the counter may helpful to reduce symptoms  Steroid injections within the carpal canal can help a majority of patients, however this is often self-limited in a majority of patients  Surgical intervention to divide the transverse carpal ligament typically results in a long-lasting relief of the patient's complaints, with the recurrence rate of less than 1%                                                                                                                                                                                   Cubital Tunnel Syndrome: The anatomy and physiology of cubital tunnel syndrome were discussed with the patient today in the office  Typically, increased elbow flexion activities decrease blood flow within the intraneural spaces, resulting in a feeling of numbness, tingling, weakness, or clumsiness within the hand and fingers  Occasionally, anatomic structures such as medial elbow osteophytes, the medial head of the triceps, were subluxing ulnar nerve may result in increased pressure or aggravation at the cubital tunnel  Typical signs and symptoms usually include numbness and tingling within the ring and small finger, weakness with , and weakness with pinch  Conservative treatment and includes nocturnal bracing to keep the elbow in a semi-extended position, activity modification, therapy, and avoiding excessive elbow flexion activities  Vitamin B6 one tablet daily over the counter may helpful to reduce symptoms  A majority of patients typically respond to conservative treatment over a period of approximately 3-6 months  EMG/NCV testing of the ulnar nerve at the elbow is not as reliable as carpal tunnel syndrome  Surgical intervention in the form of in situ release of the ulnar nerve at the elbow or ulnar nerve transposition may be required in up to 20% of patients       Operative Discussions:  Cubital Tunnel Release: The anatomy and physiology of cubital tunnel syndrome were discussed with the patient today in the office  Typically, increased elbow flexion activities decrease blood flow within the intraneural spaces, resulting in a feeling of numbness, tingling, weakness, or clumsiness within the hand and fingers  Occasionally, anatomic structures such as medial elbow osteophytes, the medial head of the triceps, were subluxing ulnar nerve may result in increased pressure or aggravation at the cubital tunnel    Typical signs and symptoms usually include numbness and tingling within the ring and small finger, weakness with , and weakness with pinch  Conservative treatment and includes nocturnal bracing to keep the elbow in a semi-extended position, activity modification, therapy, and avoiding excessive elbow flexion activities  A majority of patients typically respond to conservative treatment over a period of approximately 3-6 months  Vitamin B6 one tablet daily over the counter may helpful to reduce symptoms  EMG/NCV testing of the ulnar nerve at the elbow is not as reliable as carpal tunnel syndrome  Surgical intervention in the form of in situ release of the ulnar nerve at the elbow or ulnar nerve transposition may be required in up to 20% of patients  The patient has elected to undergo cubital tunnel release  The possibility of converting to a subcutaneous or submuscular ulnar nerve transposition depending on the nerve stability was discussed with the patient  Typically, in the postoperative period, light activities are allowed immediately, driving is allowed when narcotic medications have stopped, and the incision may get wet after 5 days  Heavy activities will be allowed after follow up appointment in 1-2 weeks  While the pain within the ring and small finger of the hands generally improves rapidly, the numbness and tingling, as well as the strength, will slowly improve over a period of weeks to months  Total recovery can take up to 18 months from the time of surgery  Numbness and tingling near the incision, or near the medial aspect of the forearm was discussed with the patient  The patient has an understanding of the above mentioned discussion  The risks and benefits of the procedure were explained to the patient, which include, but are not limited to: Bleeding, infection, recurrence, pain, scar, damage to tendons, damage to nerves, and damage to blood vessels, failure to give desired results and complications related to anesthesia    These risks, along with alternative conservative treatment options, and postoperative protocols were voiced back and understood by the patient  All questions were answered to the patient's satisfaction  The patient agrees to comply with a standard postoperative protocol, and is willing to proceed  Education was provided via written and auditory forms  There were no barriers to learning  Written handouts regarding wound care, incision and scar care, and general preoperative information was provided to the patient  Prior to surgery, the patient may be requested to stop all anti-inflammatory medications  Prophylactic aspirin, Plavix, and Coumadin may be allowed to be continued  Medications including vitamin E , ginkgo, and fish oil are requested to be stopped approximately one week prior to surgery  Hypertensive medications and beta blockers, if taken, should be continued  Vitamin B6 one tablet daily over the counter may helpful to reduce symptoms  and Open Carpal Tunnel Release: The anatomy and physiology of carpal tunnel syndrome was discussed with the patient today  Increase pressure localized under the transverse carpal ligament can cause pain, numbness, tingling, or dysesthesias within the median nerve distribution as well as feelings of fatigue, clumsiness, or awkwardness  These symptoms typically occur at night and worse in the morning upon waking  Eventually, untreated carpal tunnel syndrome can result in weakness and permanent loss of muscle within the thenar compartment of the hand  Treatment options were discussed with the patient  Conservative treatment includes nocturnal resting splints to keep the nerve in a neutral position, ergonomic changes within the work or home environment, activity modification, and tendon gliding exercises  Vitamin B6 one tablet daily over the counter may helpful to reduce symptoms  Steroid injections within the carpal canal can help a majority of patients, however this is often self-limited in a majority of patients  Surgical intervention to divide the transverse carpal ligament typically results in a long-lasting relief of the patient's complaints, with the recurrence rate of less than 1%  The patient has elected to undergo an open carpal tunnel release  The palmar incision technique was discussed in the office with the patient today  In the postoperative period, light activities are allowed immediately, driving is allowed when narcotic medication has stopped, and the bandages may be removed and incision may get wet after 2 days  Heavy activities (lifting more than approximately 10 pounds) will be allowed after follow up appointment in 1-2 weeks  While night symptoms (waking from sleep, pain, and discomfort in the hands) generally improves rapidly, the numbness and tingling as well as the strength will slowly improve over weeks to months depending on the chronicity and severity of the carpal tunnel syndrome  Pillar pain and scar discomfort were discussed with the patient which are self-limiting conditions  The risks of bleeding and infection from the surgery are less than 1%  Risk of recurrence is approximately 0 5%  The risks of nerve injury or nerve damage or damage to the blood vessels is approximately 1 in 1200  The patient has an understanding of the above mentioned discussion  The risks and benefits of the procedure were explained to the patient, which include, but are not limited to: Bleeding, infection, recurrence, pain, scar, damage to tendons, damage to nerves, and damage to blood vessels, failure to give desired results and complications related to anesthesia  These risks, along with alternative conservative treatment options, and postoperative protocols were voiced back and understood by the patient  All questions were answered to the patient's satisfaction  The patient agrees to comply with a standard postoperative protocol, and is willing to proceed    Education was provided via written and auditory forms   There were no barriers to learning  Written handouts regarding wound care, incision and scar care, and general preoperative information was provided to the patient  Prior to surgery, the patient may be requested to stop all anti-inflammatory medications  Prophylactic aspirin, Plavix, and Coumadin may be allowed to be continued  Medications including vitamin E , ginkgo, and fish oil are requested to be stopped approximately one week prior to surgery  Hypertensive medications and beta blockers, if taken, should be continued          ____________________________________________________________________________________________________________________________________________        CHIEF COMPLAINT:      Chief Complaint   Patient presents with    Left Wrist - Pain, Tingling, Numbness         SUBJECTIVE:  Aaron Wilder is a 67y o  year old RHD male seen in consultation requested by Dr Remberto Carlson who presents for evaluation of the left upper extremity  He notes persistent numbness and insensitivity to temperature of the small finger which has been progressing for months  He denies significant symptoms in the radial 3 digits  He does have history of wrist fractures as a child but did not discuss any surgeries on the wrist   He has tried wrist bracing since the EMG was done and has not noticed a significant difference        Pain/symptom timing:  Worse during the day when active  Pain/symptom context:  Worse with activites and work  Pain/symptom modifying factors:  Rest makes better, activities make worse  Pain/symptom associated signs/symptoms: none     Prior treatment   · NSAIDsNo   · Injections No   · Bracing/Orthotics Yes    Physical Therapy No      I have personally reviewed all the relevant PMH, PSH, SH, FH, Medications and allergies        PAST MEDICAL HISTORY:       Past Medical History:   Diagnosis Date    Bilateral carotid artery stenosis      Chronic diastolic heart failure (HCC)      Chronic ischemic heart disease      COPD [facility]chronic obstructive pulmonary disease) ([facility]      Coronary artery disease       hx stents, MI, PCI    Hearing loss      Hyperlipidemia      Hypertension      MI [facility]myocardial infarction) ([facility] [address] [facility]     S/P carotid endarterectomy      Sleep apnea      Sleep apnea, obstructive      Stented coronary artery           PAST SURGICAL HISTORY:        Past Surgical History:   Procedure Laterality Date    CARDIAC CATHETERIZATION   2021     left main with no significant disease, proximal LAD with 10% stenosis at the site of prior stent, left circumflex artery with minimal luminal irregularities mid RCA with 50% stenosis at site of prior stent and PL segment with distal disease supplied by collaterals from the distal circumflex with no significant CAD requiring revascularization at that time      COLONOSCOPY        CORONARY ANGIOPLASTY WITH STENT PLACEMENT        RCA    CORONARY ANGIOPLASTY WITH STENT PLACEMENT        RCA    CORONARY ANGIOPLASTY WITH STENT PLACEMENT        LAD    EYE SURGERY        SKIN CANCER EXCISION         arm         FAMILY HISTORY:        Family History   Problem Relation Age of Onset    Heart attack Mother      Dementia Mother           SOCIAL HISTORY:  Social History            Tobacco Use    Smoking status: Former Smoker       Quit date: [address]       Years since quittin 2    Smokeless tobacco: Never Used   Vaping Use    Vaping Use: Never used   Substance Use Topics    Alcohol use: Not Currently    Drug use: Never         MEDICATIONS:     Current Outpatient Medications:     albuterol (5 mg/mL) 0 5 % nebulizer solution, 2 5 mg every 6 (six) hours as needed , Disp: , Rfl:     Ascorbic Acid (vitamin C) 1000 MG tablet, Take 1,000 mg by mouth daily, Disp: , Rfl:     aspirin 81 mg chewable tablet, CHEW ONE TABLET BY MOUTH EVERY DAY, Disp: , Rfl:     azithromycin (ZITHROMAX) 250 mg tablet, Take 1 every Monday, Wednesday and Friday , Disp: 45 tablet, Rfl: 3    Blood Pressure Monitor KIT, Use daily, Disp: 1 kit, Rfl: 0    famotidine (PEPCID) 20 mg tablet, Take 1 tablet (20 mg total) by mouth daily, Disp: 100 tablet, Rfl: 3    fluticasone (FLONASE) 50 mcg/act nasal spray, into each nostril as needed , Disp: , Rfl:     fluticasone-salmeterol (Wixela Inhub) 500-50 mcg/dose inhaler, Inhale 1 puff in the morning Rinse mouth after use , Disp: , Rfl:     furosemide (LASIX) 40 mg tablet, Take 1 tablet (40 mg total) by mouth daily, Disp: 100 tablet, Rfl: 3    methocarbamol (ROBAXIN) 500 mg tablet, Take 1 tablet (500 mg total) by mouth 3 (three) times a day as needed for muscle spasms, Disp: 30 tablet, Rfl: 0    metoprolol succinate (TOPROL-XL) 25 mg 24 hr tablet, Take 2 tablets (50 mg total) by mouth 2 (two) times a day, Disp: 100 tablet, Rfl: 3    Multiple Vitamin (multivitamin) tablet, Take 1 tablet by mouth daily, Disp: , Rfl:     nitroglycerin (NITROSTAT) 0 4 mg SL tablet, DISSOLVE ONE TABLET UNDER THE TONGUE  PRN, Disp: , Rfl:     potassium chloride (Klor-Con) 10 mEq tablet, Take 2 tablets (20 mEq total) by mouth daily, Disp: 200 tablet, Rfl: 3    rosuvastatin (CRESTOR) 40 MG tablet, Take 1 tablet (40 mg total) by mouth daily, Disp: 100 tablet, Rfl: 3    Tiotropium Bromide Monohydrate (SPIRIVA RESPIMAT IN), Inhale, Disp: , Rfl:      ALLERGIES:       Allergies   Allergen Reactions    Lisinopril Swelling and Cough    Tetanus Antitoxin Anaphylaxis    Tetanus Toxoid Anaphylaxis and Swelling               REVIEW OF SYSTEMS:  Review of Systems   Constitutional: Negative for chills, fatigue, fever and unexpected weight change  HENT: Negative for ear pain, hearing loss, nosebleeds and sore throat  Eyes: Negative for pain, redness and visual disturbance  Respiratory: Negative for cough, shortness of breath and wheezing  Cardiovascular: Negative for chest pain, palpitations and leg swelling     Gastrointestinal: Negative for abdominal pain, nausea and vomiting  Endocrine: Negative for polydipsia and polyuria  Genitourinary: Negative for difficulty urinating, dysuria and hematuria  Musculoskeletal: Negative for arthralgias, back pain, joint swelling and myalgias  Skin: Negative for color change, rash and wound  Neurological: Positive for numbness  Negative for dizziness, seizures, syncope and headaches  Psychiatric/Behavioral: Negative for decreased concentration, dysphoric mood and suicidal ideas  The patient is not nervous/anxious  All other systems reviewed and are negative         VITALS:      Vitals:     03/10/22 1341   BP: 122/79   Pulse: 74   Resp: 17         LABS:  HgA1c:         Lab Results   Component Value Date     HGBA1C 5 7 (H) 07/20/2021      BMP:         Lab Results   Component Value Date     CALCIUM 9 3 02/28/2022     K 3 8 02/28/2022     CO2 34 (H) 02/28/2022      02/28/2022     BUN 20 02/28/2022     CREATININE 1 30 02/28/2022         _____________________________________________________  PHYSICAL EXAMINATION:  General: well developed and well nourished, alert, oriented times 3 and appears comfortable  Psychiatric: Normal  HEENT: Normocephalic, Atraumatic Trachea Midline, No torticollis  Pulmonary: No audible wheezing or respiratory distress   Abdomen/GI: Non tender, non distended   Cardiovascular: No pitting edema, 2+ radial pulse   Skin: No masses, erythema, lacerations, fluctation, ulcerations  Neurovascular: Sensation intact to the Median Nerve, Sensation Intact to the Radial Nerve, Decreased Sensation to  the Ulnar Nerve, Motor Intact to the Median, Ulnar, Radial Nerve and Pulses Intact  Musculoskeletal: Normal, except as noted in detailed exam and in HPI         MUSCULOSKELETAL EXAMINATION:  Left Carpal Tunnel Exam:     Negative thenar atrophy  Negative phalen's test  Negative carpal tunnel compression  Negative tinels over median nerve at the wrist   Opposition strength 5/5  Abduction strength 5/5     Left Ulnar Nerve Exam:     Negative intrinsic atrophy  Negative  deformity at the elbow  Full range of motion with flexion and extension of the elbow  Positive ulnar nerve compression test at the elbow  Negative tinels over the ulnar nerve at the elbow  Positive cross finger test in the index and long fingers  FDP strength is 5/5 to the ring finger  FDP strength is 5/5 to the small finger    Intrinsic strength 5/5       ___________________________________________________  STUDIES REVIEWED:  I have personally reviewed EMG of left upper extremity which demonstrates localized neuropathic process of the ulnar nerve at the elbow (cubital tunnel syndrome) as well as entrapment of the ulnar nerve at the wrist at Guyon's canal   Patient also has entrapment of the median nerve at the wrist consistent with moderate to severe carpal tunnel syndrome

## 2022-07-22 NOTE — ANESTHESIA PROCEDURE NOTES
Peripheral Block    Patient location during procedure: pre-op  Start time: 7/22/2022 8:40 AM  Reason for block: at surgeon's request and post-op pain management  Staffing  Performed: Anesthesiologist   Anesthesiologist: Uvaldo Boggs MD  Preanesthetic Checklist  Completed: patient identified, IV checked, site marked, risks and benefits discussed, surgical consent, monitors and equipment checked, pre-op evaluation and timeout performed  Peripheral Block  Patient position: sitting  Prep: ChloraPrep  Patient monitoring: heart rate, continuous pulse ox and frequent blood pressure checks  Block type: supraclavicular  Laterality: left  Injection technique: single-shot  Procedures: ultrasound guided, Ultrasound guidance required for the procedure to increase accuracy and safety of medication placement and decrease risk of complications    Ultrasound permanent image savedropivacaine (NAROPIN) 0 5 % - Perineural   10 mL - 7/22/2022 8:40:00 AM  Needle  Needle type: Stimuplex   Needle gauge: 22 G  Needle length: 10 cm  Needle localization: ultrasound guidance  Assessment  Injection assessment: incremental injection, local visualized surrounding nerve on ultrasound, negative aspiration for heme and no paresthesia on injection  Paresthesia pain: none  Heart rate change: no  Slow fractionated injection: yes  Post-procedure:  site cleaned  patient tolerated the procedure well with no immediate complications

## 2022-07-26 ENCOUNTER — OFFICE VISIT (OUTPATIENT)
Dept: OCCUPATIONAL THERAPY | Facility: CLINIC | Age: 77
End: 2022-07-26
Payer: COMMERCIAL

## 2022-07-26 DIAGNOSIS — G56.22 CUBITAL TUNNEL SYNDROME ON LEFT: Primary | ICD-10-CM

## 2022-07-26 DIAGNOSIS — G56.02 CARPAL TUNNEL SYNDROME ON LEFT: ICD-10-CM

## 2022-07-26 PROCEDURE — 97166 OT EVAL MOD COMPLEX 45 MIN: CPT

## 2022-07-26 PROCEDURE — 97535 SELF CARE MNGMENT TRAINING: CPT

## 2022-07-26 NOTE — PROGRESS NOTES
OT Evaluation     Today's date: 2022  Patient name: Demarcus Mcclain  :   MRN: [id number]  Referring provider: Supa Obrien MD  Dx:   Encounter Diagnosis     ICD-10-CM    1  Cubital tunnel syndrome on left  G56 22 Ambulatory Referral to PT/OT Hand Therapy   2  Carpal tunnel syndrome on left  G56 02 Ambulatory Referral to PT/OT Hand Therapy                  Assessment  Assessment details: Patient presenting to OP OT services with a dx of Cubital Tunnel Syndrome  Patient had Cubital Tunnel release and carpal tunnel release completed on 2022 by Dr Sravani Spaulding  Patient reports symptoms began years ago and has progressively gotten worse  Patient has follow-up with Ortho appointment on   Patient is right hand dominant  Patient was an  and   Patient has CTS in right hand  Impairments: abnormal or restricted ROM, activity intolerance and impaired physical strength    Symptom irritability: moderateUnderstanding of Dx/Px/POC: good   Prognosis: good    Goals  STGs    Pt will increase  strength by 5-10#  - Not Met    Pt will increase wrist and elbow strength by 1/2 grade  - Not Met    Pt will increase wrist ROM by 50%  - Not Met     Pt will demonstrate decrease in edema by 25%  - Not Met    Pt will report a decrease in sensation deficits by 25%  - Not Met    Independent with HEP  - Not Met    Pt will reports no more than half of current pain rate with activity  - Not Met      LTGs     Pt will increase  strength by an additional 5-10#  - Not Met    Pt will increase wrist and elbow strength by 1-2 grade  - Not Met    Pt will increase wrist ROM to [facility]  - Not Met     Pt will demonstrate decrease in edema by 50%  - Not Met    Pt will increase pinch strength by 3-5#  - Not Met    Pt will report an increase in ADL/IADL participation  - Not Met    Pt will report a decrease in sensation deficits by 50%   - Not Met    Pt will report no more than a 0/10 pain with activity - Not Met        Plan  Plan details: Patient presenting to OP OT services with a dx of Cubital Tunnel  Patient demonstrating decreased pain, decreased strength, decreased ROM and decreased activity  Pt would benefit from continued Occupational Therapy services one times per week for 4 weeks to return to prior level of function and achieve all established goals  Thank you for the referral!    Patient would benefit from: OT eval, skilled occupational therapy and custom splinting  Referral necessary: Yes  Planned modality interventions: cryotherapy, thermotherapy: hydrocollator packs and ultrasound  Planned therapy interventions: joint mobilization, manual therapy, massage, motor coordination training, patient education, self care, strengthening, therapeutic activities, therapeutic exercise, stretching, fine motor coordination training, functional ROM exercises and home exercise program  Frequency: 1x week  Duration in visits: 4  Duration in weeks: 4  Treatment plan discussed with: patient        Subjective Evaluation    History of Present Illness  Date of surgery: 2022  Mechanism of injury: surgery  Mechanism of injury: S/p left CTR and Cubital Tunnel Release          Not a recurrent problem   Quality of life: good    Pain  Current pain ratin  At best pain ratin  At worst pain ratin  Location: Left Elbow    Social Support    Employment status: not working  Hand dominance: right      Diagnostic Tests  EMG: abnormal  Treatments  Current treatment: occupational therapy  Patient Goals  Patient goals for therapy: decreased pain, increased motion, increased strength, independence with ADLs/IADLs, return to sport/leisure activities and decreased edema          Objective     Observations     Left Elbow   Positive for incision  Left Wrist/Hand   Positive for incision  Additional Observation Details  Patient presenting with a 4 5 cm incision along medial elbow of left UE with steri-stripes present    and Patient presenting with a 6 cm incision along left ulnar aspect of palm with sutures present and no drainage  Neurological Testing     Sensation     Elbow   Left Elbow   Diminished: light touch    Right Elbow   Intact: light touch    Wrist/Hand   Left   Diminished: light touch    Right   Diminished: light touch    Additional Neurological Details  Patient reports an slight improvement in sensation deficits throughout ulnar nerve distribution of left hand as compared to before surgery  Patient reports an slight improvement in sensation deficits throughout ulnar nerve distribution of left hand as compared to before surgery    Active Range of Motion     Left Elbow   Normal active range of motion    Right Elbow   Normal active range of motion    Left Wrist   Wrist flexion: 40 degrees   Wrist extension: 70 degrees   Radial deviation: 20 degrees   Ulnar deviation: 45 degrees     Right Wrist   Normal active range of motion    Left Thumb   Opposition: WNL    Right Thumb   Opposition: WNL    Additional Active Range of Motion Details  Composite fist noted    Strength/Myotome Testing     Left Elbow   Flexion: 3  Extension: 3  Forearm supination: 3  Forearm pronation: 3    Right Elbow   Normal strength    Left Wrist/Hand   Wrist extension: 3  Wrist flexion: 3  Radial deviation: 3  Ulnar deviation: 3     (2nd hand position)     Trial 1: 0    Thumb Strength  Key/Lateral Pinch     Trial 1: 0    Right Wrist/Hand   Normal wrist strength     (2nd hand position)     Trial 1: 75    Thumb Strength   Key/Lateral Pinch     Trial 1: 19    Additional Strength Details   and pinch NT at this time    Swelling     Left Wrist/Hand   Circumference wrist: 19 8 cm    Additional Swelling Details  Increased swelling noted along left wrist    Right Wrist/Hand   Circumference wrist: 18 4 cm  Neuro Exam:     Functional outcomes   Left 9 peg hole test: 0 (seconds)  Right 9 hole peg test: 22 64 (seconds)    Patient tolerated session well  Patient and therapist discussed exercises as per initial HEP  Patient verbalized understanding of education and demonstrated proper technique  Therapist will make increases as tolerated   Continue with POC              Precautions: Cubital Tunnel Release and R CTR  Initial Evaluation completed on: 07/26/2022  Re-Evaluation needs to be completed before: 08/26/2022  Insurance: [facility] REP  FOTO: 07/26/2022  Auth Visits: N/A Units3         Manuals 07/26/2022       IASTM Flexor Mass        Scar Massage Dressing Change                               Neuro Re-Ed  07/26/2022                       Ther Ex 07/26/2022       Wrist Flexor Stretch        ANDREW  Ball Roll Out  [place] HEP       MNG at Wrist  At Neck        Wrist AROM  Flex/Ext  RD/UD HEP       Digi-Flex        Flex Bar  Twists   Bends                        Ther Activity 07/26/2022       Tension Pins w/ Pom Poms                                        Modalities 07/26/2022       Ultrasound        E-STIM w/ MH        CP 5'       MH

## 2022-08-02 ENCOUNTER — OFFICE VISIT (OUTPATIENT)
Dept: OCCUPATIONAL THERAPY | Facility: CLINIC | Age: 77
End: 2022-08-02
Payer: COMMERCIAL

## 2022-08-02 DIAGNOSIS — G56.02 CARPAL TUNNEL SYNDROME ON LEFT: ICD-10-CM

## 2022-08-02 DIAGNOSIS — G56.22 CUBITAL TUNNEL SYNDROME ON LEFT: Primary | ICD-10-CM

## 2022-08-02 PROCEDURE — 97110 THERAPEUTIC EXERCISES: CPT

## 2022-08-02 PROCEDURE — 97535 SELF CARE MNGMENT TRAINING: CPT

## 2022-08-02 PROCEDURE — 97140 MANUAL THERAPY 1/> REGIONS: CPT

## 2022-08-02 NOTE — PROGRESS NOTES
Daily Note     Today's date: 2022  Patient name: Arelis Obrien  :   MRN: [id number]  Referring provider: Patti Britt MD  Dx:   Encounter Diagnosis     ICD-10-CM    1  Cubital tunnel syndrome on left  G56 22    2  Carpal tunnel syndrome on left  G56 02                   Subjective: "I have been cleaning it out "      Objective: See treatment diary below      Assessment: Tolerated treatment well  Patient exhibited good technique with therapeutic exercises and would benefit from continued OT  Routine healing noted throughout both incisions  Patient reports compliance with HEP  Patient has follow-up with Ortho tomorrow  Patient and therapist reviewed additional exercises this session with verbalization noted  Patient will follow-up with therapy for one more session to progress exercises and address incision post suture removal        Plan: Continue per plan of care  Progress treatment as tolerated            Precautions: Cubital Tunnel Release and R CTR  Initial Evaluation completed on: 2022  Re-Evaluation needs to be completed before: 2022  Insurance:  [place]: 2022  Auth Visits: N/A Units3         Manuals 2022      IASTM Flexor Mass  5'      Scar Massage Dressing Change 10'                              Neuro Re-Ed  2022                      Ther Ex 2022      Wrist Flexor Stretch        ANDREW  Ball Roll Out  [place] HEP       MNG at Wrist  At Neck        Wrist AROM  Flex/Ext  RD/UD HEP       Digi-Flex  Foam HEP      Flex Bar  Twists   Bends        Wrist Isometrics  Flex  Ext    10 sec x 5  10 sec x 5      Elbow Isometrics  Flex/Ext      10 sec x 5                              Ther Activity 2022      Tension Pins w/ Pom Poms                                        Modalities 2022      Ultrasound        E-STIM w/ MH        CP 5'       MH

## 2022-08-03 ENCOUNTER — OFFICE VISIT (OUTPATIENT)
Dept: OBGYN CLINIC | Facility: CLINIC | Age: 77
End: 2022-08-03

## 2022-08-03 VITALS
SYSTOLIC BLOOD PRESSURE: 99 MMHG | HEIGHT: 68 IN | HEART RATE: 79 BPM | DIASTOLIC BLOOD PRESSURE: 65 MMHG | WEIGHT: 206 LBS | BODY MASS INDEX: 31.22 KG/M2

## 2022-08-03 DIAGNOSIS — G56.02 CARPAL TUNNEL SYNDROME ON LEFT: ICD-10-CM

## 2022-08-03 DIAGNOSIS — G56.22 CUBITAL TUNNEL SYNDROME ON LEFT: Primary | ICD-10-CM

## 2022-08-03 DIAGNOSIS — G56.22 GUYON SYNDROME, LEFT: ICD-10-CM

## 2022-08-03 PROCEDURE — 99024 POSTOP FOLLOW-UP VISIT: CPT | Performed by: SURGERY

## 2022-08-03 NOTE — PROGRESS NOTES
Assessment/Plan:  Patient ID: Fifi Wilburn 65 y o  male   Surgery: Release Cubital Tunnel - Left, Release Carpal Tunnel - Left, and Guyon's Canal Release - Left  Date of Surgery: 7/22/2022    Sutures removed  Begin scar massage at elbow  Small opening at wrist crease, should close up without problem  Avoid peroxide or neosporin   Activity as tolerated  Return if any concerns     Follow Up:  PRN    To Do Next Visit:         CHIEF COMPLAINT:  Chief Complaint   Patient presents with    Left Wrist - Post-op, Suture / Staple Removal    Left Elbow - Post-op, Suture / Staple Removal         SUBJECTIVE:  Fifi Wilburn is a 65y o  year old male who presents for follow up after Release Cubital Tunnel - Left, Release Carpal Tunnel - Left, and Guyon's Canal Release - Left  Today patient has some residual tingling in the small and ring fingers  Notices minor changes with surgery  No fever or chills  Small wound opening at wrist crease that he has been cleaning with peroxide  PHYSICAL EXAMINATION:  General: well developed and well nourished, alert, oriented times 3 and appears comfortable  Psychiatric: Normal    MUSCULOSKELETAL EXAMINATION:  Incision: Clean, dry, intact   Small area of opening at wrist crease without evidence of infection   Surgery Site: normal, no evidence of infection   Range of Motion: opposition intact and full composite fist possible  Neurovascular status: Neuro intact, good cap refill  Activity Restrictions: No restrictions  Done today: Sutures out      STUDIES REVIEWED:  No Studies to review      PROCEDURES PERFORMED:  Procedures  No Procedures performed today      Niles Vigil PA-C

## 2022-08-03 NOTE — PROGRESS NOTES
Assessment/Plan:  Patient ID: Marcela Valadez 65 y o  male   Surgery: Release Cubital Tunnel - Left, Release Carpal Tunnel - Left, and Guyon's Canal Release - Left  Date of Surgery: 2022    {kmtreatments:47577}    Follow Up:  {follow up time choices:25778}    To Do Next Visit:  {To do next visit:84841::" "}      CHIEF COMPLAINT:  Chief Complaint   Patient presents with    Left Wrist - Post-op, Suture / Staple Removal    Left Elbow - Post-op, Suture / Staple Removal         SUBJECTIVE:  Marcela Valadez is a 65y o  year old male who presents for follow up after Release Cubital Tunnel - Left, Release Carpal Tunnel - Left, and Guyon's Canal Release - Left  Today patient has {Complaint:52245}         PHYSICAL EXAMINATION:  General: {1234:87060::"well developed and well nourished","alert","oriented times 3","appears comfortable"}  Psychiatric: {Psych:25122::"Normal"}    MUSCULOSKELETAL EXAMINATION:  Incision: {post op incision:50321::"Clean, dry, intact"}  Surgery Site: {post op incision:12764}  Range of Motion: {post op rom:84515::"As expected"}  Neurovascular status: {post op neuro exam:95683::"Neuro intact, good cap refill"}  Activity Restrictions: {post op activity:76333::"No restrictions"}  {anything done today?:31455}      STUDIES REVIEWED:  {kmimagin::"No Studies to review"}      PROCEDURES PERFORMED:  Procedures  {Was Procdoc done:78606::"No Procedures performed today"}    Scribe Attestation    I,:   am acting as a scribe while in the presence of the attending physician :       I,:   personally performed the services described in this documentation    as scribed in my presence :

## 2022-08-10 ENCOUNTER — OFFICE VISIT (OUTPATIENT)
Dept: OCCUPATIONAL THERAPY | Facility: CLINIC | Age: 77
End: 2022-08-10
Payer: COMMERCIAL

## 2022-08-10 DIAGNOSIS — G56.02 CARPAL TUNNEL SYNDROME ON LEFT: ICD-10-CM

## 2022-08-10 DIAGNOSIS — G56.22 CUBITAL TUNNEL SYNDROME ON LEFT: Primary | ICD-10-CM

## 2022-08-10 PROCEDURE — 97110 THERAPEUTIC EXERCISES: CPT

## 2022-08-10 PROCEDURE — 97140 MANUAL THERAPY 1/> REGIONS: CPT

## 2022-08-10 NOTE — PROGRESS NOTES
Daily Note     Today's date: 8/10/2022  Patient name: Shelby Huston  : [birth year]  MRN: [id number]  Referring provider: Daniella Bellamy MD  Dx:   Encounter Diagnosis     ICD-10-CM    1  Cubital tunnel syndrome on left  G56 22    2  Carpal tunnel syndrome on left  G56 02                   Subjective: No, it feels good  Objective: See treatment diary below      Assessment: Tolerated treatment well  Patient exhibited good technique with therapeutic exercises and would benefit from continued OT Pt presents this date with no pain noted  Pt participated in skilled OT this date with focus on ROM, minimal strength, incision management, and HEP review, for increased safety and engagement in ADL/IADL activities  Pt presents with good ROM and incision healing well this date  One small piece at wrist line that is still open and healing without discharge or redness noted  Plan: Continue per plan of care  Progress treatment as tolerated  Progress treament per protocol           Precautions: Cubital Tunnel Release and R CTR  Initial Evaluation completed on: 2022  Re-Evaluation needs to be completed before: 2022  Insurance:  [place]: 2022  Auth Visits: N/A Units3         Manuals 2022 2022 8/10/2022     IASTM Flexor Mass  5' Performed     Scar Massage Dressing Change 10' Performed                             Neuro Re-Ed  2022                      Ther Ex 2022 2022 8/10/2022     Wrist Flexor Stretch        ANDREW  Ball Roll Out  [place] HEP       MNG at Wrist  At Neck        Wrist AROM  Flex/Ext  RD/UD  Pro/Sup  CW/CCW HEP    X 30  X 30  X 30  X 30     Digi-Flex  Foam HEP Red  X 30     Flex Bar  Twists   Bends   Yellow    X 30 2 way     Wrist Isometrics  Flex  Ext    10 sec x 5  10 sec x 5      Elbow Isometrics  Flex/Ext      10 sec x 5      Hand Power Pro   Green  X 25                     Ther Activity 2022 2022 8/10/2022     Tension Pins w/ Pom Poms                                        Modalities 07/26/2022 8/2/2022 8/10/2022     Ultrasound        E-STIM w/ MACKENZIE        CP 5'  Deferred     MH

## 2022-08-17 ENCOUNTER — OFFICE VISIT (OUTPATIENT)
Dept: OCCUPATIONAL THERAPY | Facility: CLINIC | Age: 77
End: 2022-08-17
Payer: COMMERCIAL

## 2022-08-17 DIAGNOSIS — G56.02 CARPAL TUNNEL SYNDROME ON LEFT: ICD-10-CM

## 2022-08-17 DIAGNOSIS — G56.22 CUBITAL TUNNEL SYNDROME ON LEFT: Primary | ICD-10-CM

## 2022-08-17 PROCEDURE — 97140 MANUAL THERAPY 1/> REGIONS: CPT

## 2022-08-17 PROCEDURE — 97530 THERAPEUTIC ACTIVITIES: CPT

## 2022-08-17 PROCEDURE — 97110 THERAPEUTIC EXERCISES: CPT

## 2022-08-17 NOTE — PROGRESS NOTES
Daily Note     Today's date: 2022  Patient name: Rebecca Wetzel  :   MRN: [id number]  Referring provider: Shruthi Dove MD  Dx:   Encounter Diagnosis     ICD-10-CM    1  Cubital tunnel syndrome on left  G56 22    2  Carpal tunnel syndrome on left  G56 02                   Subjective: It feels good  Objective: See treatment diary below      Assessment: Tolerated treatment well  Patient exhibited good technique with therapeutic exercises and would benefit from continued OT  Pt tolerated increase resistance well  Denied pain pre and post tx  Plan: Pt requesting D/C secondary to high copay  D/C OT tx today          Precautions: Cubital Tunnel Release and R CTR  Initial Evaluation completed on: 2022  Re-Evaluation needs to be completed before: 2022  Insurance:  [place]: 2022  Auth Visits: N/A Units3         Manuals 2022 2022 8/10/2022 2022    IASTM Flexor Mass  5' Performed Performed    Scar Massage Dressing Change 10' Performed Performed                            Neuro Re-Ed  2022                      Ther Ex 2022 2022 8/10/2022 2022    Wrist Flexor Stretch        ANDREW  Ball Roll Out  [place] HEP       MNG at Wrist  At Neck        Wrist AROM  Flex/Ext  RD/UD  Pro/Sup  CW/CCW HEP    X 30  X 30  X 30  X 30   X 30  X 30  X 30  X 30    Digi-Flex  Foam HEP Red  X 30 Green  X 30    Flex Bar  Twists   Bends   Yellow    X 30 2 way Yellow  X 30 2 way    Wrist Isometrics  Flex  Ext    10 sec x 5  10 sec x 5      Elbow Isometrics  Flex/Ext      10 sec x 5      Hand Power Pro   Green  X 25 Green  X 30    UBC    4F/4B L 60            Ther Activity 2022 2022 8/10/2022 2022    Tension Pins w/ Pom Poms    Blue tension pin                                    Modalities 2022 2022 8/10/2022 2022    Ultrasound        E-STIM w/ MH        CP 5'  Deferred     MH

## 2022-08-18 NOTE — PROGRESS NOTES
OT DISCHARGE    Patient requested to be discharged this date due to high co-pay  Please refer to last assessment for most recent objective measurements  DC OT services

## 2022-08-19 ENCOUNTER — TELEPHONE (OUTPATIENT)
Dept: OTHER | Facility: OTHER | Age: 77
End: 2022-08-19

## 2022-08-19 NOTE — TELEPHONE ENCOUNTER
Pt would like to be seen for some issues related to his COPD  Please ca pt on Monday after 1000 to schedule for an apt

## 2022-08-23 ENCOUNTER — TELEPHONE (OUTPATIENT)
Dept: UROLOGY | Facility: MEDICAL CENTER | Age: 77
End: 2022-08-23

## 2022-08-24 ENCOUNTER — APPOINTMENT (OUTPATIENT)
Dept: LAB | Facility: CLINIC | Age: 77
End: 2022-08-24
Payer: COMMERCIAL

## 2022-08-24 ENCOUNTER — TELEPHONE (OUTPATIENT)
Dept: PULMONOLOGY | Facility: CLINIC | Age: 77
End: 2022-08-24

## 2022-08-24 ENCOUNTER — APPOINTMENT (OUTPATIENT)
Dept: RADIOLOGY | Facility: CLINIC | Age: 77
End: 2022-08-24
Payer: COMMERCIAL

## 2022-08-24 DIAGNOSIS — J42 CHRONIC BRONCHITIS, UNSPECIFIED CHRONIC BRONCHITIS TYPE (HCC): Primary | ICD-10-CM

## 2022-08-24 DIAGNOSIS — J42 CHRONIC BRONCHITIS, UNSPECIFIED CHRONIC BRONCHITIS TYPE (HCC): ICD-10-CM

## 2022-08-24 LAB
BASOPHILS # BLD AUTO: 0.06 THOUSANDS/ΜL (ref 0–0.1)
BASOPHILS NFR BLD AUTO: 1 % (ref 0–1)
EOSINOPHIL # BLD AUTO: 0.13 THOUSAND/ΜL (ref 0–0.61)
EOSINOPHIL NFR BLD AUTO: 2 % (ref 0–6)
ERYTHROCYTE [DISTWIDTH] IN BLOOD BY AUTOMATED COUNT: 14.6 % (ref 11.6–15.1)
HCT VFR BLD AUTO: 54.4 % (ref 36.5–49.3)
HGB BLD-MCNC: 16.9 G/DL (ref 12–17)
IMM GRANULOCYTES # BLD AUTO: 0.04 THOUSAND/UL (ref 0–0.2)
IMM GRANULOCYTES NFR BLD AUTO: 1 % (ref 0–2)
LYMPHOCYTES # BLD AUTO: 1.28 THOUSANDS/ΜL (ref 0.6–4.47)
LYMPHOCYTES NFR BLD AUTO: 15 % (ref 14–44)
MCH RBC QN AUTO: 31.8 PG (ref 26.8–34.3)
MCHC RBC AUTO-ENTMCNC: 31.1 G/DL (ref 31.4–37.4)
MCV RBC AUTO: 102 FL (ref 82–98)
MONOCYTES # BLD AUTO: 1.1 THOUSAND/ΜL (ref 0.17–1.22)
MONOCYTES NFR BLD AUTO: 13 % (ref 4–12)
NEUTROPHILS # BLD AUTO: 6.22 THOUSANDS/ΜL (ref 1.85–7.62)
NEUTS SEG NFR BLD AUTO: 68 % (ref 43–75)
NRBC BLD AUTO-RTO: 0 /100 WBCS
PLATELET # BLD AUTO: 195 THOUSANDS/UL (ref 149–390)
PMV BLD AUTO: 11.3 FL (ref 8.9–12.7)
RBC # BLD AUTO: 5.32 MILLION/UL (ref 3.88–5.62)
WBC # BLD AUTO: 8.83 THOUSAND/UL (ref 4.31–10.16)

## 2022-08-24 PROCEDURE — 36415 COLL VENOUS BLD VENIPUNCTURE: CPT

## 2022-08-24 PROCEDURE — 85025 COMPLETE CBC W/AUTO DIFF WBC: CPT

## 2022-08-24 PROCEDURE — 71046 X-RAY EXAM CHEST 2 VIEWS: CPT

## 2022-08-24 NOTE — TELEPHONE ENCOUNTER
Adan Fletcher would like a return call regarding     What is the reason for the call/chief complaint? COPD    What additional symptoms are present or absent? SOB, yes   Are they on O2? Yes, describe: 2L Pulse O2 resting 94-95 When walking HIGH 80'S   chest pain/tightness, yes  wheezing, no  Cough, yes  mucous production,yes  What color is it YELLOWISH  fevers/chills, no  weight gain, no  Swelling no  Pain no, does it hurt when breathing or all the time? N/A    When did they start/how long have they been going on? PT SAID A COUPLE OF MONTHS    Constant or intermittent; if intermittent describe IT WORSENS MOSTLY AT NIGHT  HE IS GETTING CHEST PAINS    What makes it better or worse? N/A    Have you been exposed to anyone that is sick? No    Have you been tested for COVID recently? no    Check current pulmonary medications INHALER/NEBS  frequency of use DAILY/PRN    Have they had any other treatment or testing for this problem elsewhere? NO    Recent steroids? No    Recent Antibiotics? ZITHROMAX 3 TIMES WEEKLY    Last office visit? 03/07/192022    Patient pharmacy? [address] [facility], [address] [address]  DR JONES#2   30 or 90 day supply   PT HAS SOME QUESTIONS ABOUT HIS SYMPTOMS AND IS REQUESTING A CALL BACK FROM A PROVIDER

## 2022-08-24 NOTE — TELEPHONE ENCOUNTER
I called the patient he is mostly complaining of rib pain when he lays down to sleep at night with some increased shortness of breath    I have added a BNP to his labs increased his Pepcid to 2 pills before bed and asked him to get a chest x-ray before Monday when I see him in the office

## 2022-08-29 ENCOUNTER — OFFICE VISIT (OUTPATIENT)
Dept: PULMONOLOGY | Facility: CLINIC | Age: 77
End: 2022-08-29
Payer: COMMERCIAL

## 2022-08-29 ENCOUNTER — APPOINTMENT (OUTPATIENT)
Dept: LAB | Facility: HOSPITAL | Age: 77
End: 2022-08-29
Payer: COMMERCIAL

## 2022-08-29 VITALS
DIASTOLIC BLOOD PRESSURE: 56 MMHG | BODY MASS INDEX: 32.89 KG/M2 | TEMPERATURE: 98.8 F | OXYGEN SATURATION: 91 % | WEIGHT: 217 LBS | SYSTOLIC BLOOD PRESSURE: 134 MMHG | HEART RATE: 72 BPM | HEIGHT: 68 IN

## 2022-08-29 DIAGNOSIS — J43.2 CENTRILOBULAR EMPHYSEMA (HCC): Primary | ICD-10-CM

## 2022-08-29 DIAGNOSIS — G47.33 OSA (OBSTRUCTIVE SLEEP APNEA): ICD-10-CM

## 2022-08-29 DIAGNOSIS — J42 CHRONIC BRONCHITIS, UNSPECIFIED CHRONIC BRONCHITIS TYPE (HCC): ICD-10-CM

## 2022-08-29 DIAGNOSIS — R07.81 RIB PAIN: ICD-10-CM

## 2022-08-29 DIAGNOSIS — R91.1 PULMONARY NODULE: ICD-10-CM

## 2022-08-29 DIAGNOSIS — R97.20 ELEVATED PSA: ICD-10-CM

## 2022-08-29 LAB
ANION GAP SERPL CALCULATED.3IONS-SCNC: 6 MMOL/L (ref 4–13)
BUN SERPL-MCNC: 25 MG/DL (ref 5–25)
CALCIUM SERPL-MCNC: 9.1 MG/DL (ref 8.4–10.2)
CHLORIDE SERPL-SCNC: 101 MMOL/L (ref 96–108)
CO2 SERPL-SCNC: 33 MMOL/L (ref 21–32)
CREAT SERPL-MCNC: 1.41 MG/DL (ref 0.6–1.3)
GFR SERPL CREATININE-BSD FRML MDRD: 47 ML/MIN/1.73SQ M
GLUCOSE SERPL-MCNC: 93 MG/DL (ref 65–140)
POTASSIUM SERPL-SCNC: 4.1 MMOL/L (ref 3.5–5.3)
SODIUM SERPL-SCNC: 140 MMOL/L (ref 135–147)

## 2022-08-29 PROCEDURE — 86003 ALLG SPEC IGE CRUDE XTRC EA: CPT

## 2022-08-29 PROCEDURE — 36415 COLL VENOUS BLD VENIPUNCTURE: CPT

## 2022-08-29 PROCEDURE — 82785 ASSAY OF IGE: CPT

## 2022-08-29 PROCEDURE — 80048 BASIC METABOLIC PNL TOTAL CA: CPT

## 2022-08-29 PROCEDURE — 99214 OFFICE O/P EST MOD 30 MIN: CPT | Performed by: INTERNAL MEDICINE

## 2022-08-29 NOTE — PROGRESS NOTES
Pulmonary Follow Up Note   Valeria Murphy 65 y o  male MRN: [id number]  8/29/2022      Assessment:    1  Centrilobular emphysema ([address] )  - He is doing well with advair  His last CT in feb shows stable upper lobe centrilobular emphysema  There is some atelectasis at the bases  He does not have worsening shortness of breath or worsening cough  Plan:  - Continue with advair and azithro 3x/weekly  - I recommended that he complete the blood work that Dr De Jesus Notice ordered including the NTproBNP and NE allergy panel to see if he may benefit from biologics    2  Rib pain  - We discussed this in depth since he is primarily complaining of this sensation  The rib pain is unlikely to be pulmonary related  There is some ateletasis at the bases on the last CT chest in 02/2022 as well as a small round pleural based lesion near the diaphragm on the right but neither of these should cause rib pain  Unlikely that this could be PE given that it has been present for nearly 1 year and not associated with cough/shortness of breath  On CT however there are several large osteophytes, disc space loss with some herniation, these are mentioned on thoracic MRI as well which could be causing neuropathy being referred to the anterior ribs bilaterally  Finally, he has some prostate enlargement and an elevated PSA with suspicion for cancer  He is scheduled for a biopsy soon  If positive, he will likely have a bone scan which may assist in assessing the vertebrae for any lesions that could be causing the pain  I also instructed him to try taking tylenol to see if that helps relieve the pain  Should the pain continue, one option would be to repeat his CT chest as a PE study which would also provide better resolution for the pleural opacification however would hold off for now given his renal function  Would not check a ddimer given he has intermittent epistaxis most recently 3 days ago and several lower extremity bruises       3  LAMBERTO (obstructive sleep apnea)  - on CPAP at night  Would need an eventual updated sleep study    4  Subcentimeter pulmonary nodules  - Several small nodules, some which have calcified  Possibly infectious  No lymphadenopathy to suggest sarcoid  He did have a swelling on the nose that was erythematous which rarely could be lupus pernio but again the remainder of his labs and CT chest are not supportive  Recommend repeat CT chest in February 2023 to f/up subcentimeter nodules  Plan:    Diagnoses and all orders for this visit:    Centrilobular emphysema ([address] )    Rib pain    LAMBERTO (obstructive sleep apnea)        No follow-ups on file  History of Present Illness   HPI:  Demarcus Mcclain is a 65 y o  male who presents with bilateral rib pain  The pain is located at the inferior margin of the ribs bilaterally at the midclavicular line  He reports that the pain was present for about 1 year  At times it feels as though it worsens with a deep breath but it is not pleuritic and he can demonstrate taking deep breaths in front of me without any pain  It is not associated with any shortness of breath, he denies any hemoptysis or chest pain  He continues to use his the wixela, on azithro  He was started on gabapentin for the rib pain  Review of Systems   Constitutional: Negative for chills and fever  HENT: Negative for congestion, postnasal drip and rhinorrhea  Eyes: Negative for itching  Respiratory: Positive for shortness of breath  Negative for cough, wheezing and stridor  Cardiovascular: Negative for chest pain, palpitations and leg swelling  Gastrointestinal: Negative for abdominal distention, abdominal pain, nausea and vomiting  Genitourinary: Negative for dysuria and flank pain  Musculoskeletal: Positive for arthralgias  Negative for myalgias  Rib pain   Skin: Negative for color change  Neurological: Negative for dizziness, light-headedness and headaches  Psychiatric/Behavioral: Negative  Historical Information   Past Medical History:   Diagnosis Date    Bilateral carotid artery stenosis     Chronic diastolic heart failure (HCC)     Chronic ischemic heart disease     Colon polyp     COPD (chronic obstructive pulmonary disease) (HCC)     Coronary artery disease     hx stents, MI, PCI    CPAP (continuous positive airway pressure) dependence     Hearing loss     Hyperlipidemia     Hypertension     MI [facility] infarction) [facility] ) [facility]    Myocardial infarction ([facility] )     Pneumonia     S/P carotid endarterectomy     Shortness of breath     Sleep apnea     Sleep apnea, obstructive     Stented coronary artery      Past Surgical History:   Procedure Laterality Date    APPENDECTOMY      CARDIAC CATHETERIZATION  03/18/2021    left main with no significant disease, proximal LAD with 10% stenosis at the site of prior stent, left circumflex artery with minimal luminal irregularities mid RCA with 50% stenosis at site of prior stent and PL segment with distal disease supplied by collaterals from the distal circumflex with no significant CAD requiring revascularization at that time      COLONOSCOPY      CORONARY ANGIOPLASTY WITH STENT PLACEMENT  1999    RCA    CORONARY ANGIOPLASTY WITH STENT PLACEMENT  2001    RCA    CORONARY ANGIOPLASTY WITH STENT PLACEMENT  2003    LAD    EYE SURGERY      AZ REVISE MEDIAN N/CARPAL TUNNEL SURG Left 7/22/2022    Procedure: RELEASE CARPAL TUNNEL;  Surgeon: Temitope Christie MD;  [facility];  Service: Orthopedics    AZ REVISE ULNAR NERVE AT ELBOW Left 7/22/2022    Procedure: RELEASE CUBITAL TUNNEL;  Surgeon: Temitope Christie MD;  [facility];  Service: Orthopedics    AZ REVISE ULNAR NERVE AT WRIST Left 7/22/2022    Procedure: Tim Liu;  Surgeon: Temitope Christie MD;  [facility];  Service: Orthopedics    SKIN CANCER EXCISION  2012    chin-per pt, basal cell     Family History   Problem Relation Age of Onset    Heart attack Mother     Dementia Mother          Meds/Allergies     Current Outpatient Medications:     albuterol (2 5 mg/3 mL) 0 083 % nebulizer solution, Take 3 mL (2 5 mg total) by nebulization every 6 (six) hours as needed for wheezing or shortness of breath, Disp: 60 mL, Rfl: 1    Ascorbic Acid (vitamin C) 1000 MG tablet, Take 1,000 mg by mouth daily (Patient not taking: No sig reported), Disp: , Rfl:     aspirin 81 mg chewable tablet, CHEW ONE TABLET BY MOUTH EVERY DAY, Disp: , Rfl:     azithromycin (ZITHROMAX) 250 mg tablet, Take 1 every Monday, Wednesday and Friday   (Patient taking differently: 250 mg Take 1 every Monday, Wednesday and Friday ), Disp: 45 tablet, Rfl: 3    Blood Pressure Monitor KIT, Use daily, Disp: 1 kit, Rfl: 0    famotidine (PEPCID) 20 mg tablet, Take 20 mg by mouth daily at bedtime, Disp: , Rfl:     fluticasone (FLONASE) 50 mcg/act nasal spray, into each nostril as needed , Disp: , Rfl:     fluticasone-salmeterol (Advair) 500-50 mcg/dose inhaler, Inhale 1 puff in the morning Rinse mouth after use , Disp: , Rfl:     furosemide (LASIX) 40 mg tablet, Take 1 tablet (40 mg total) by mouth daily, Disp: 100 tablet, Rfl: 3    gabapentin (NEURONTIN) 300 mg capsule, 1 PO QHS x 1 day, then 1 PO BID x 1 day, then 1 PO TID, Disp: 90 capsule, Rfl: 1    methocarbamol (ROBAXIN) 500 mg tablet, Take 1 tablet (500 mg total) by mouth 3 (three) times a day as needed for muscle spasms, Disp: 30 tablet, Rfl: 0    metoprolol succinate (TOPROL-XL) 25 mg 24 hr tablet, Take 1 tablet (25 mg total) by mouth daily (Patient taking differently: Take 75 mg by mouth daily in the early morning), Disp: 90 tablet, Rfl: 3    metoprolol succinate (TOPROL-XL) 50 mg 24 hr tablet, Take 1 tablet (50 mg total) by mouth 2 (two) times a day (Patient taking differently: Take 50 mg by mouth daily at bedtime), Disp: 180 tablet, Rfl: 3    Multiple Vitamin (multivitamin) tablet, Take 1 tablet by mouth daily, Disp: , Rfl:     nitroglycerin (NITROSTAT) 0 4 mg SL tablet, DISSOLVE ONE TABLET UNDER THE TONGUE  PRN, Disp: , Rfl:     potassium chloride (Klor-Con) 10 mEq tablet, Take 2 tablets (20 mEq total) by mouth daily, Disp: 200 tablet, Rfl: 3    rosuvastatin (CRESTOR) 40 MG tablet, Take 1 tablet (40 mg total) by mouth daily (Patient taking differently: Take 40 mg by mouth daily at bedtime), Disp: 100 tablet, Rfl: 3    Tiotropium Bromide Monohydrate (SPIRIVA RESPIMAT IN), Inhale every morning, Disp: , Rfl:   Allergies   Allergen Reactions    Lisinopril Swelling and Cough    Tetanus Antitoxin Anaphylaxis    Tetanus Toxoid Anaphylaxis and Swelling       Vitals: Blood pressure 134/56, pulse 72, temperature 98 8 °F (37 1 °C), height 5' 8" (1 727 m), weight 98 4 kg (217 lb), SpO2 91 %  Body mass index is 32 99 kg/m²  Oxygen Therapy  SpO2: 91 % (2l)      Physical Exam  Physical Exam  Constitutional:       General: He is not in acute distress  Appearance: He is not diaphoretic  HENT:      Head: Normocephalic and atraumatic  Nose: Nose normal       Mouth/Throat:      Pharynx: No oropharyngeal exudate  Eyes:      General: No scleral icterus  Conjunctiva/sclera: Conjunctivae normal       Pupils: Pupils are equal, round, and reactive to light  Neck:      Thyroid: No thyromegaly  Vascular: No JVD  Trachea: No tracheal deviation  Cardiovascular:      Rate and Rhythm: Normal rate and regular rhythm  Heart sounds: Normal heart sounds  No murmur heard  No friction rub  No gallop  Pulmonary:      Effort: Pulmonary effort is normal  No respiratory distress  Breath sounds: Normal breath sounds  No stridor  No wheezing or rales  Abdominal:      General: Bowel sounds are normal  There is no distension  Palpations: Abdomen is soft  Tenderness: There is no abdominal tenderness  There is no guarding or rebound  Musculoskeletal:         General: No deformity   Normal range of motion  Cervical back: Normal range of motion and neck supple  Lymphadenopathy:      Cervical: No cervical adenopathy  Skin:     General: Skin is warm  Findings: No erythema or rash  Neurological:      Mental Status: He is alert and oriented to person, place, and time  Cranial Nerves: No cranial nerve deficit  Sensory: No sensory deficit  Labs: I have personally reviewed pertinent lab results  Lab Results   Component Value Date    WBC 8 83 08/24/2022    HGB 16 9 08/24/2022    HCT 54 4 (H) 08/24/2022     (H) 08/24/2022     08/24/2022     Lab Results   Component Value Date    CALCIUM 8 9 04/18/2022    K 3 8 04/18/2022    CO2 33 (H) 04/18/2022     04/18/2022    BUN 17 04/18/2022    CREATININE 1 30 04/18/2022     No results found for: IGE  Lab Results   Component Value Date    ALT 26 04/18/2022    AST 22 04/18/2022    ALKPHOS 80 04/18/2022         Imaging and other studies: I have personally reviewed pertinent reports  and I have personally reviewed pertinent films in PACS  Chest xray 08/24/2022    Pulmonary function testing: none on file    EKG, Pathology, and Other Studies: I have personally reviewed pertinent reports     and I have personally reviewed pertinent films in PACS    Cirilo Good MD  Pulmonary and Critical Care   [facility]

## 2022-08-30 ENCOUNTER — ANESTHESIA EVENT (OUTPATIENT)
Dept: GASTROENTEROLOGY | Facility: HOSPITAL | Age: 77
End: 2022-08-30
Payer: COMMERCIAL

## 2022-08-30 LAB

## 2022-08-31 NOTE — TELEPHONE ENCOUNTER
I spoke to the patient and moved his procedure to [facility] with Dr Demetrius Tan on same day (9/13/2022) per anesthesia request  New Instruction packet Emailed

## 2022-09-06 ENCOUNTER — LAB (OUTPATIENT)
Dept: LAB | Facility: CLINIC | Age: 77
End: 2022-09-06
Payer: COMMERCIAL

## 2022-09-06 ENCOUNTER — APPOINTMENT (OUTPATIENT)
Dept: URGENT CARE | Facility: CLINIC | Age: 77
End: 2022-09-06
Payer: COMMERCIAL

## 2022-09-06 DIAGNOSIS — G56.02 CARPAL TUNNEL SYNDROME ON LEFT: ICD-10-CM

## 2022-09-06 DIAGNOSIS — Z01.812 ENCOUNTER FOR PRE-OPERATIVE LABORATORY TESTING: ICD-10-CM

## 2022-09-06 DIAGNOSIS — G56.22 CUBITAL TUNNEL SYNDROME ON LEFT: ICD-10-CM

## 2022-09-06 DIAGNOSIS — R97.20 ELEVATED PROSTATE SPECIFIC ANTIGEN (PSA): ICD-10-CM

## 2022-09-06 DIAGNOSIS — G56.22 GUYON SYNDROME, LEFT: ICD-10-CM

## 2022-09-06 PROCEDURE — 85025 COMPLETE CBC W/AUTO DIFF WBC: CPT

## 2022-09-06 PROCEDURE — 87086 URINE CULTURE/COLONY COUNT: CPT

## 2022-09-06 PROCEDURE — 36415 COLL VENOUS BLD VENIPUNCTURE: CPT

## 2022-09-06 PROCEDURE — 93005 ELECTROCARDIOGRAM TRACING: CPT

## 2022-09-06 PROCEDURE — 80053 COMPREHEN METABOLIC PANEL: CPT

## 2022-09-07 LAB
ALBUMIN SERPL BCP-MCNC: 3.7 G/DL (ref 3.5–5)
ALP SERPL-CCNC: 79 U/L (ref 46–116)
ALT SERPL W P-5'-P-CCNC: 30 U/L (ref 12–78)
ANION GAP SERPL CALCULATED.3IONS-SCNC: 5 MMOL/L (ref 4–13)
AST SERPL W P-5'-P-CCNC: 30 U/L (ref 5–45)
BACTERIA UR CULT: NORMAL
BASOPHILS # BLD AUTO: 0.07 THOUSANDS/ΜL (ref 0–0.1)
BASOPHILS NFR BLD AUTO: 1 % (ref 0–1)
BILIRUB SERPL-MCNC: 0.6 MG/DL (ref 0.2–1)
BUN SERPL-MCNC: 19 MG/DL (ref 5–25)
CALCIUM SERPL-MCNC: 9.4 MG/DL (ref 8.3–10.1)
CHLORIDE SERPL-SCNC: 105 MMOL/L (ref 96–108)
CO2 SERPL-SCNC: 31 MMOL/L (ref 21–32)
CREAT SERPL-MCNC: 1.29 MG/DL (ref 0.6–1.3)
EOSINOPHIL # BLD AUTO: 0.17 THOUSAND/ΜL (ref 0–0.61)
EOSINOPHIL NFR BLD AUTO: 2 % (ref 0–6)
ERYTHROCYTE [DISTWIDTH] IN BLOOD BY AUTOMATED COUNT: 14.6 % (ref 11.6–15.1)
GFR SERPL CREATININE-BSD FRML MDRD: 52 ML/MIN/1.73SQ M
GLUCOSE P FAST SERPL-MCNC: 91 MG/DL (ref 65–99)
HCT VFR BLD AUTO: 54.8 % (ref 36.5–49.3)
HGB BLD-MCNC: 16.7 G/DL (ref 12–17)
IMM GRANULOCYTES # BLD AUTO: 0.04 THOUSAND/UL (ref 0–0.2)
IMM GRANULOCYTES NFR BLD AUTO: 0 % (ref 0–2)
LYMPHOCYTES # BLD AUTO: 1.2 THOUSANDS/ΜL (ref 0.6–4.47)
LYMPHOCYTES NFR BLD AUTO: 13 % (ref 14–44)
MCH RBC QN AUTO: 31.5 PG (ref 26.8–34.3)
MCHC RBC AUTO-ENTMCNC: 30.5 G/DL (ref 31.4–37.4)
MCV RBC AUTO: 103 FL (ref 82–98)
MONOCYTES # BLD AUTO: 1.2 THOUSAND/ΜL (ref 0.17–1.22)
MONOCYTES NFR BLD AUTO: 13 % (ref 4–12)
NEUTROPHILS # BLD AUTO: 6.68 THOUSANDS/ΜL (ref 1.85–7.62)
NEUTS SEG NFR BLD AUTO: 71 % (ref 43–75)
NRBC BLD AUTO-RTO: 0 /100 WBCS
PLATELET # BLD AUTO: 185 THOUSANDS/UL (ref 149–390)
PMV BLD AUTO: 11.4 FL (ref 8.9–12.7)
POTASSIUM SERPL-SCNC: 4.3 MMOL/L (ref 3.5–5.3)
PROT SERPL-MCNC: 6.7 G/DL (ref 6.4–8.4)
RBC # BLD AUTO: 5.3 MILLION/UL (ref 3.88–5.62)
SODIUM SERPL-SCNC: 141 MMOL/L (ref 135–147)
WBC # BLD AUTO: 9.36 THOUSAND/UL (ref 4.31–10.16)

## 2022-09-08 ENCOUNTER — TELEPHONE (OUTPATIENT)
Dept: UROLOGY | Facility: AMBULATORY SURGERY CENTER | Age: 77
End: 2022-09-08

## 2022-09-08 ENCOUNTER — OFFICE VISIT (OUTPATIENT)
Dept: FAMILY MEDICINE CLINIC | Facility: CLINIC | Age: 77
End: 2022-09-08
Payer: COMMERCIAL

## 2022-09-08 ENCOUNTER — APPOINTMENT (OUTPATIENT)
Dept: RADIOLOGY | Facility: CLINIC | Age: 77
End: 2022-09-08
Payer: COMMERCIAL

## 2022-09-08 VITALS
BODY MASS INDEX: 32.69 KG/M2 | TEMPERATURE: 98.5 F | HEART RATE: 68 BPM | RESPIRATION RATE: 16 BRPM | DIASTOLIC BLOOD PRESSURE: 62 MMHG | OXYGEN SATURATION: 86 % | SYSTOLIC BLOOD PRESSURE: 122 MMHG | WEIGHT: 215 LBS

## 2022-09-08 DIAGNOSIS — R05.9 COUGH: ICD-10-CM

## 2022-09-08 DIAGNOSIS — R71.8 ELEVATED MCV: ICD-10-CM

## 2022-09-08 DIAGNOSIS — R09.02 HYPOXIA: ICD-10-CM

## 2022-09-08 DIAGNOSIS — R07.81 RIB PAIN: ICD-10-CM

## 2022-09-08 DIAGNOSIS — J44.9 CHRONIC OBSTRUCTIVE PULMONARY DISEASE, UNSPECIFIED COPD TYPE (HCC): Primary | ICD-10-CM

## 2022-09-08 DIAGNOSIS — R25.1 SHAKINESS: ICD-10-CM

## 2022-09-08 PROCEDURE — 71046 X-RAY EXAM CHEST 2 VIEWS: CPT

## 2022-09-08 PROCEDURE — 99214 OFFICE O/P EST MOD 30 MIN: CPT | Performed by: FAMILY MEDICINE

## 2022-09-08 PROCEDURE — 3725F SCREEN DEPRESSION PERFORMED: CPT | Performed by: FAMILY MEDICINE

## 2022-09-08 NOTE — PROGRESS NOTES
Assessment/Plan:       Problem List Items Addressed This Visit        Respiratory    Chronic obstructive pulmonary disease (COPD) ([facility] [address] ) - Primary    Hypoxia     - Recommended to use oxygen to maintain >88%            Other    Cough     - Will obtain chest XR assess for any acute changes          Relevant Orders    XR chest pa & lateral    Rib pain    Elevated MCV     - Will assess B12 level, repeat labs ordered          Relevant Orders    Vitamin B12    CBC and differential    Shakiness     - Unclear etiology of internal shakiness, suspect likely due to hypoxia   - Recommended to check BP, oxygen level, HR during episode   - Labs ordered          Relevant Orders    TSH, 3rd generation with Free T4 reflex    Basic metabolic panel            Subjective:      Patient ID: Fifi Wilburn is a 65 y o  male  HPI     Following with pulmonology for COPD and rib pain, he reports breathing seems to be progressively getting worse  Pain in side is minimal, continued pain in ribs both sides for years  Gabapentin seems to be helping  Hypoxia- He reports not using oxygen as much as he should, sats 82 %, 86% at home, goes down into 70s, uses home oxygen sometimes  Admits to recent worsening cough, productive but clear/white, no change in sputum color  No fevers  Recent blood work reviewed, , hemoglobin normal      He feels like his entire body is internally shaking/jello, no dizziness, doesn't last long 1-2 minutes  Legs and arms felt like going to collapse, head like shaking  Has happened in past  Sporadic  Other topics brought up to address further next visit-  History of thyroid nodules  Voice becomes hoarse especially with talking, no gastric reflux  Neck pain turning to left hurts, spasm  Stretching exercises provided        The following portions of the patient's history were reviewed and updated as appropriate: allergies, current medications, past family history, past medical history, past social history, past surgical history, and problem list     Review of Systems   All other systems reviewed and are negative  Objective:      /62   Pulse 68   Temp 98 5 °F (36 9 °C)   Resp 16   Wt 97 5 kg (215 lb)   SpO2 (!) 86%   BMI 32 69 kg/m²          Physical Exam  Vitals reviewed  Constitutional:       General: He is not in acute distress  Appearance: Normal appearance  He is not ill-appearing, toxic-appearing or diaphoretic  HENT:      Head: Normocephalic and atraumatic  Eyes:      General:         Right eye: No discharge  Left eye: No discharge  Extraocular Movements: Extraocular movements intact  Conjunctiva/sclera: Conjunctivae normal    Cardiovascular:      Rate and Rhythm: Normal rate and regular rhythm  Heart sounds: Normal heart sounds  No murmur heard  No friction rub  No gallop  Comments: Distant heart sounds   Pulmonary:      Effort: Pulmonary effort is normal  No respiratory distress  Breath sounds: No stridor  No wheezing or rhonchi  Comments: Diminished breath sounds, more prominent crackles L basilar   Musculoskeletal:         General: No swelling, tenderness or signs of injury  Skin:     General: Skin is warm  Coloration: Skin is not pale  Findings: No erythema or rash  Neurological:      Mental Status: He is alert and oriented to person, place, and time  Motor: No weakness     Psychiatric:         Mood and Affect: Mood normal          Behavior: Behavior normal              DO Kaleb Barron [address] [facility]

## 2022-09-08 NOTE — PATIENT INSTRUCTIONS
Neck Exercises   AMBULATORY CARE:   Neck exercises  help reduce neck pain, and improve neck movement and strength  Neck exercises also help prevent long-term neck problems  What you need to know about neck exercises:   Do the exercises every day,  or as often as directed by your healthcare provider  Move slowly, gently, and smoothly  Avoid fast or jerky motions  Stand and sit the way your healthcare provider shows you  Good posture may reduce your neck pain  Check your posture often, even when you are not doing your neck exercises  How to perform neck exercises safely:   Exercise position:  You may sit or stand while you do neck exercises  Face forward  Your shoulders should be straight and relaxed, with a good posture  Head tilts, forward and back:  Gently bow your head and try to touch your chin to your chest  Your healthcare provider may tell you to push on the back of your neck to help bow your head  Raise your chin back to the starting position  Tilt your head back as far as possible so you are looking up at the ceiling  Your healthcare provider may tell you to lift your chin to help tilt your head back  Return your head to the starting position  Head tilts, side to side:  Tilt your head, bringing your ear toward your shoulder  Then tilt your head toward the other shoulder  Head turns:  Turn your head to look over your shoulder  Tilt your chin down and try to touch it to your shoulder  Do not raise your shoulder to your chin  Face forward again  Do the same on the other side  Head rolls:  Slowly bring your chin toward your chest  Next, roll your head to the right  Your ear should be positioned over your shoulder  Hold this position for 5 seconds  Roll your head back toward your chest and to the left into the same position  Hold for 5 seconds  Gently roll your head back and around in a clockwise [place] 3 times   Next, move your head in the reverse direction (counterclockwise) in a [place] 3 times  Do not shrug your shoulders upwards while you do this exercise  Contact your healthcare provider if:   Your pain does not get better, or gets worse  You have questions or concerns about your condition, care, or exercise program     © Copyright [employer] 2022 Information is for End User's use only and may not be sold, redistributed or otherwise used for commercial purposes  All illustrations and images included in CareNotes® are the copyrighted property of A D A M , Inc  or Yamilka Gomez  The above information is an  only  It is not intended as medical advice for individual conditions or treatments  Talk to your doctor, nurse or pharmacist before following any medical regimen to see if it is safe and effective for you  Chronic Neck Pain   AMBULATORY CARE:   Chronic neck pain  may start to build slowly over time  Neck pain is chronic if it lasts longer than 3 months  The pain may come and go, or be worse with certain movements  The pain may be only in your neck, or it may move to your arms, back, or shoulders  You may have pain that starts in another body area and moves to your neck  You may have neck pain for years  Some types of neck pain can be permanent  Other signs or symptoms that may happen with chronic neck pain:  The pain may be only in your neck, or it may move to your arms, back, or shoulders  You may also have pain that starts in another body area and moves to your neck  Signs and symptoms will depend on what is causing your pain  You may have any of the following:  Neck or muscle stiffness    Tingling or numbness in your arms, hand, or fingers    Muscle weakness or spasms    Less range of motion in your neck or shoulder joints    Nausea or dizziness    Call your doctor if:   You have neck pain and shooting pain down your arms or legs  Your neck pain suddenly becomes severe      You have neck pain along with numbness, tingling, or weakness in your arms or legs  You have a stiff neck, a headache, and a fever  You have new or worsening symptoms  Your symptoms continue even after treatment  You have questions or concerns about your condition or care  Treatment  may include any of the following, depending on what is causing your pain:  Medicines  may be prescribed or recommended by your healthcare provider for pain  You may need medicine to treat nerve pain or to stop muscle spasms  Medicines may also be given to reduce inflammation  Your healthcare provider may inject medicine into a nerve to block pain  Over-the-counter NSAID medicine or acetaminophen may be recommended to help treat minor pain or inflammation  Traction  is used to relieve pressure from nerves  Your head is gently pulled up and away from your neck  This stretches muscles and ligaments and makes more room for the spine  Your healthcare provider will tell you the kind of traction that will help your neck pain  Do not use traction devices at home unless directed by your healthcare provider  Surgery  may be needed if the pain is severe or other treatments do not work  Surgery will not help every kind of neck pain  You may need surgery to stabilize a fractured bone or to remove a tumor  Surgery may also be used to widen a narrowed spinal column or to remove a disc from between neck bones  Manage or prevent chronic neck pain:   Rest your neck as directed  Do not make sudden movements, such as turning your head quickly  Your healthcare provider may recommend you wear a cervical collar for a short time  The collar will prevent you from moving your head  This will give your neck time to heal if an injury is causing your neck pain  Ask your healthcare provider when you can return to sports or other normal daily activities  Apply ice for 15 to 20 minutes every hour, or as directed  Use an ice pack, or put crushed ice in a plastic bag   Cover it with a towel before you apply it to your skin  Ice decreases pain and helps prevent tissue damage  Apply heat for 20 to 30 minutes every 2 hours, or as directed  Heat helps decrease pain and muscle spasms  Do neck exercises as directed  Neck exercises help strengthen the muscles and increase range of motion  Your healthcare provider will tell you which exercises are right for you  He or she may give you instructions, or he or she may recommend that you work with a physical therapist  Your healthcare provider or therapist can make sure you are doing the exercises correctly  Maintain good posture  Keep your head and shoulders lifted when you sit  If you work in front of a computer, put the monitor at eye level  You should not need to look up or down to see the screen  You should also not have to lean forward to read what is on the screen  Keep your keyboard, mouse, and other computer items where you do not have to reach for them  Get up often if you work in front of a computer or sit for long periods of time  Stretch or walk around to keep your neck muscles loose  Ask about acupuncture for pain relief  Neck pain is sometimes relieved with acupuncture  Talk to your healthcare provider before you get this treatment to make sure it is safe for you  Follow up with your healthcare provider as directed: Your healthcare provider may refer you to a specialist if your pain does not get better with treatment  Write down your questions so you remember to ask them during your visits  © Copyright [employer] 2022 Information is for End User's use only and may not be sold, redistributed or otherwise used for commercial purposes  All illustrations and images included in CareNotes® are the copyrighted property of A D A M , Inc  or Yamilka Gomez  The above information is an  only  It is not intended as medical advice for individual conditions or treatments   Talk to your doctor, nurse or pharmacist before following any medical regimen to see if it is safe and effective for you

## 2022-09-08 NOTE — TELEPHONE ENCOUNTER
Called and spoke to pt about the prep for his upcoming procedure       I am calling in regards to your prep instructions for your appointment at the [facility] [address]  on 9/13/22 with Dr Lopez Bauer  under IV Se  We need you to please make sure to stop all blood thinners (including multivitamins) ( Eliquis or Warfarin should be clarified with Cardiology before stopping) 7 day prior to you appointment  Pt should have nothing to eat after midnight the day before the procedure  The pt will need to make sure they have a   Finally the Pt will need to use an enema at least 2 hour prior to the appointment  If you have any questions please call [phone] and ask for [facility] "    Patient  expressed understanding of instructions and has no further questions at this time

## 2022-09-09 LAB
ATRIAL RATE: 64 BPM
P AXIS: 69 DEGREES
PR INTERVAL: 174 MS
QRS AXIS: 60 DEGREES
QRSD INTERVAL: 96 MS
QT INTERVAL: 442 MS
QTC INTERVAL: 455 MS
T WAVE AXIS: 47 DEGREES
VENTRICULAR RATE: 64 BPM

## 2022-09-09 PROCEDURE — 93010 ELECTROCARDIOGRAM REPORT: CPT | Performed by: INTERNAL MEDICINE

## 2022-09-09 NOTE — ASSESSMENT & PLAN NOTE
- Unclear etiology of internal shakiness, suspect likely due to hypoxia   - Recommended to check BP, oxygen level, HR during episode   - Labs ordered

## 2022-09-10 ENCOUNTER — APPOINTMENT (OUTPATIENT)
Dept: RADIOLOGY | Facility: HOSPITAL | Age: 77
End: 2022-09-10
Payer: COMMERCIAL

## 2022-09-10 ENCOUNTER — HOSPITAL ENCOUNTER (EMERGENCY)
Facility: HOSPITAL | Age: 77
Discharge: HOME | End: 2022-09-10
Attending: EMERGENCY MEDICINE
Payer: COMMERCIAL

## 2022-09-10 VITALS
DIASTOLIC BLOOD PRESSURE: 54 MMHG | RESPIRATION RATE: 19 BRPM | TEMPERATURE: 98.7 F | SYSTOLIC BLOOD PRESSURE: 115 MMHG | OXYGEN SATURATION: 98 % | HEART RATE: 64 BPM

## 2022-09-10 DIAGNOSIS — R07.9 CHEST PAIN: Primary | ICD-10-CM

## 2022-09-10 LAB
2HR DELTA HS TROPONIN: -1 NG/L
ALBUMIN SERPL BCP-MCNC: 4.1 G/DL (ref 3.5–5)
ALP SERPL-CCNC: 67 U/L (ref 34–104)
ALT SERPL W P-5'-P-CCNC: 21 U/L (ref 7–52)
ANION GAP SERPL CALCULATED.3IONS-SCNC: 6 MMOL/L (ref 4–13)
AST SERPL W P-5'-P-CCNC: 24 U/L (ref 13–39)
ATRIAL RATE: 64 BPM
ATRIAL RATE: 69 BPM
ATRIAL RATE: 71 BPM
ATRIAL RATE: 78 BPM
BASOPHILS # BLD AUTO: 0.05 THOUSANDS/ΜL (ref 0–0.1)
BASOPHILS NFR BLD AUTO: 1 % (ref 0–1)
BILIRUB SERPL-MCNC: 0.51 MG/DL (ref 0.2–1)
BUN SERPL-MCNC: 21 MG/DL (ref 5–25)
CALCIUM SERPL-MCNC: 9.3 MG/DL (ref 8.4–10.2)
CARDIAC TROPONIN I PNL SERPL HS: 12 NG/L
CARDIAC TROPONIN I PNL SERPL HS: 13 NG/L
CHLORIDE SERPL-SCNC: 101 MMOL/L (ref 96–108)
CO2 SERPL-SCNC: 34 MMOL/L (ref 21–32)
CREAT SERPL-MCNC: 1.21 MG/DL (ref 0.6–1.3)
EOSINOPHIL # BLD AUTO: 0.19 THOUSAND/ΜL (ref 0–0.61)
EOSINOPHIL NFR BLD AUTO: 2 % (ref 0–6)
ERYTHROCYTE [DISTWIDTH] IN BLOOD BY AUTOMATED COUNT: 13.9 % (ref 11.6–15.1)
GFR SERPL CREATININE-BSD FRML MDRD: 56 ML/MIN/1.73SQ M
GLUCOSE SERPL-MCNC: 102 MG/DL (ref 65–140)
HCT VFR BLD AUTO: 54.3 % (ref 36.5–49.3)
HGB BLD-MCNC: 16.9 G/DL (ref 12–17)
IMM GRANULOCYTES # BLD AUTO: 0.03 THOUSAND/UL (ref 0–0.2)
IMM GRANULOCYTES NFR BLD AUTO: 0 % (ref 0–2)
LYMPHOCYTES # BLD AUTO: 1.43 THOUSANDS/ΜL (ref 0.6–4.47)
LYMPHOCYTES NFR BLD AUTO: 13 % (ref 14–44)
MCH RBC QN AUTO: 32 PG (ref 26.8–34.3)
MCHC RBC AUTO-ENTMCNC: 31.1 G/DL (ref 31.4–37.4)
MCV RBC AUTO: 103 FL (ref 82–98)
MONOCYTES # BLD AUTO: 1.3 THOUSAND/ΜL (ref 0.17–1.22)
MONOCYTES NFR BLD AUTO: 12 % (ref 4–12)
NEUTROPHILS # BLD AUTO: 7.8 THOUSANDS/ΜL (ref 1.85–7.62)
NEUTS SEG NFR BLD AUTO: 72 % (ref 43–75)
NRBC BLD AUTO-RTO: 0 /100 WBCS
P AXIS: 58 DEGREES
P AXIS: 59 DEGREES
P AXIS: 60 DEGREES
P AXIS: 70 DEGREES
PLATELET # BLD AUTO: 173 THOUSANDS/UL (ref 149–390)
PMV BLD AUTO: 10.4 FL (ref 8.9–12.7)
POTASSIUM SERPL-SCNC: 3.7 MMOL/L (ref 3.5–5.3)
PR INTERVAL: 172 MS
PR INTERVAL: 174 MS
PR INTERVAL: 176 MS
PR INTERVAL: 180 MS
PROT SERPL-MCNC: 6.5 G/DL (ref 6.4–8.4)
QRS AXIS: 53 DEGREES
QRS AXIS: 61 DEGREES
QRS AXIS: 63 DEGREES
QRS AXIS: 64 DEGREES
QRSD INTERVAL: 94 MS
QRSD INTERVAL: 96 MS
QT INTERVAL: 424 MS
QT INTERVAL: 428 MS
QT INTERVAL: 430 MS
QT INTERVAL: 434 MS
QTC INTERVAL: 447 MS
QTC INTERVAL: 460 MS
QTC INTERVAL: 465 MS
QTC INTERVAL: 483 MS
RBC # BLD AUTO: 5.28 MILLION/UL (ref 3.88–5.62)
SODIUM SERPL-SCNC: 141 MMOL/L (ref 135–147)
T WAVE AXIS: 16 DEGREES
T WAVE AXIS: 18 DEGREES
T WAVE AXIS: 36 DEGREES
T WAVE AXIS: 38 DEGREES
VENTRICULAR RATE: 64 BPM
VENTRICULAR RATE: 69 BPM
VENTRICULAR RATE: 71 BPM
VENTRICULAR RATE: 78 BPM
WBC # BLD AUTO: 10.8 THOUSAND/UL (ref 4.31–10.16)

## 2022-09-10 PROCEDURE — 36415 COLL VENOUS BLD VENIPUNCTURE: CPT | Performed by: EMERGENCY MEDICINE

## 2022-09-10 PROCEDURE — 93005 ELECTROCARDIOGRAM TRACING: CPT

## 2022-09-10 PROCEDURE — 99285 EMERGENCY DEPT VISIT HI MDM: CPT | Performed by: EMERGENCY MEDICINE

## 2022-09-10 PROCEDURE — 80053 COMPREHEN METABOLIC PANEL: CPT | Performed by: EMERGENCY MEDICINE

## 2022-09-10 PROCEDURE — 99285 EMERGENCY DEPT VISIT HI MDM: CPT

## 2022-09-10 PROCEDURE — 71045 X-RAY EXAM CHEST 1 VIEW: CPT

## 2022-09-10 PROCEDURE — 93010 ELECTROCARDIOGRAM REPORT: CPT | Performed by: INTERNAL MEDICINE

## 2022-09-10 PROCEDURE — 84484 ASSAY OF TROPONIN QUANT: CPT | Performed by: EMERGENCY MEDICINE

## 2022-09-10 PROCEDURE — 85025 COMPLETE CBC W/AUTO DIFF WBC: CPT | Performed by: EMERGENCY MEDICINE

## 2022-09-10 NOTE — DISCHARGE INSTRUCTIONS
Recommend Tylenol as needed for pain  Use your albuterol nebulizer as needed for shortness of breath and chest tightness    Follow-up with Cardiology and primary care physician    Return for any worsening symptoms

## 2022-09-10 NOTE — ED PROVIDER NOTES
History  Chief Complaint   Patient presents with    Chest Pain     Started earlier tonight, took 2 nitro with no relief      79-year-old male with COPD and significant cardiac history with multiple stents in place presents to the emergency department for evaluation of left-sided chest pain that started at approximately 6:00 p m  yesterday  Pain has been intermittent since  At 1 point did radiate into the left arm  He does have chronic shortness of breath that has not worsened  He denies any pleuritic component  No cough or recent illness  No diaphoresis, nausea or vomiting  No abdominal pain, leg pain or swelling  No headache, fever, chills  Did try two nitros without relief  He states this pain feels similar to prior heart attacks  Prior to Admission Medications   Prescriptions Last Dose Informant Patient Reported? Taking? Ascorbic Acid (vitamin C) 1000 MG tablet   Yes No   Sig: Take 1,000 mg by mouth daily   Patient not taking: No sig reported   Blood Pressure Monitor KIT  Self No No   Sig: Use daily   Multiple Vitamin (multivitamin) tablet   Yes No   Sig: Take 1 tablet by mouth daily   Tiotropium Bromide Monohydrate (SPIRIVA RESPIMAT IN)  Self Yes No   Sig: Inhale every morning   albuterol (2 5 mg/3 mL) 0 083 % nebulizer solution   No No   Sig: Take 3 mL (2 5 mg total) by nebulization every 6 (six) hours as needed for wheezing or shortness of breath   aspirin 81 mg chewable tablet  Self Yes No   Sig: CHEW ONE TABLET BY MOUTH EVERY DAY   azithromycin (ZITHROMAX) 250 mg tablet   No No   Sig: Take 1 every Monday, Wednesday and Friday  Patient taking differently: 250 mg Take 1 every Monday, Wednesday and Friday     famotidine (PEPCID) 20 mg tablet   Yes No   Sig: Take 20 mg by mouth daily at bedtime   fluticasone (FLONASE) 50 mcg/act nasal spray  Self Yes No   Sig: into each nostril as needed    fluticasone-salmeterol (Advair) 500-50 mcg/dose inhaler  Self Yes No   Sig: Inhale 1 puff in the morning Rinse mouth after use     furosemide (LASIX) 40 mg tablet  Self No No   Sig: Take 1 tablet (40 mg total) by mouth daily   gabapentin (NEURONTIN) 300 mg capsule   No No   Si PO QHS x 1 day, then 1 PO BID x 1 day, then 1 PO TID   methocarbamol (ROBAXIN) 500 mg tablet   No No   Sig: Take 1 tablet (500 mg total) by mouth 3 (three) times a day as needed for muscle spasms   metoprolol succinate (TOPROL-XL) 25 mg 24 hr tablet   No No   Sig: Take 1 tablet (25 mg total) by mouth daily   Patient taking differently: Take 75 mg by mouth daily in the early morning   metoprolol succinate (TOPROL-XL) 50 mg 24 hr tablet   No No   Sig: Take 1 tablet (50 mg total) by mouth 2 (two) times a day   Patient taking differently: Take 50 mg by mouth daily at bedtime   nitroglycerin (NITROSTAT) 0 4 mg SL tablet  Self Yes No   Sig: DISSOLVE ONE TABLET UNDER THE TONGUE  PRN   potassium chloride (Klor-Con) 10 mEq tablet  Self No No   Sig: Take 2 tablets (20 mEq total) by mouth daily   rosuvastatin (CRESTOR) 40 MG tablet   No No   Sig: Take 1 tablet (40 mg total) by mouth daily   Patient not taking: Reported on 2022      Facility-Administered Medications: None       Past Medical History:   Diagnosis Date    Bilateral carotid artery stenosis     Chronic diastolic heart failure (HCC)     Chronic ischemic heart disease     Colon polyp     COPD (chronic obstructive pulmonary disease) (HCC)     Coronary artery disease     hx stents, MI, PCI    CPAP (continuous positive airway pressure) dependence     Hearing loss     Hyperlipidemia     Hypertension     [facility]     [facility]     Pneumonia     S/P carotid endarterectomy     Shortness of breath     Sleep apnea     Sleep apnea, obstructive     Stented coronary artery        Past Surgical History:   Procedure Laterality Date    APPENDECTOMY      CARDIAC CATHETERIZATION  2021    left main with no significant disease, proximal LAD with 10% stenosis at the site of prior stent, left circumflex artery with minimal luminal irregularities mid RCA with 50% stenosis at site of prior stent and PL segment with distal disease supplied by collaterals from the distal circumflex with no significant CAD requiring revascularization at that time   COLONOSCOPY      CORONARY ANGIOPLASTY WITH STENT PLACEMENT      RCA    CORONARY ANGIOPLASTY WITH STENT PLACEMENT      RCA    CORONARY ANGIOPLASTY WITH STENT PLACEMENT      LAD    EYE SURGERY      FL REVISE MEDIAN N/CARPAL TUNNEL SURG Left 2022    Procedure: RELEASE CARPAL TUNNEL;  Surgeon: Temitope Christie MD;  [facility];  Service: Orthopedics    FL REVISE ULNAR NERVE AT ELBOW Left 2022    Procedure: Reji Nassau;  Surgeon: Temitope Christie MD;  [facility];  Service: Orthopedics    FL REVISE ULNAR NERVE AT WRIST Left 2022    Procedure: Tim Liu;  Surgeon: Temitope Christie MD;  [facility];  Service: Orthopedics    SKIN CANCER EXCISION      chin-per pt, basal cell       Family History   Problem Relation Age of Onset    Heart attack Mother     Dementia Mother      I have reviewed and agree with the history as documented  E-Cigarette/Vaping    E-Cigarette Use Never User      E-Cigarette/Vaping Substances    Nicotine No     THC No     CBD No     Flavoring No     Other No     Unknown No      Social History     Tobacco Use    Smoking status: Former Smoker     Quit date:      Years since quittin 7    Smokeless tobacco: Never Used   Vaping Use    Vaping Use: Never used   Substance Use Topics    Alcohol use: Not Currently    Drug use: Never       Review of Systems   Constitutional: Negative for chills and fever  HENT: Negative for ear pain and sore throat  Eyes: Negative for pain and visual disturbance  Respiratory: Positive for shortness of breath  Negative for cough  Cardiovascular: Positive for chest pain  Negative for palpitations  Gastrointestinal: Negative for abdominal pain and vomiting  Genitourinary: Negative for dysuria and hematuria  Musculoskeletal: Negative for arthralgias and back pain  Skin: Negative for color change and rash  Neurological: Negative for seizures and syncope  All other systems reviewed and are negative  Physical Exam  Physical Exam  Vitals and nursing note reviewed  Constitutional:       Appearance: He is well-developed  HENT:      Head: Normocephalic and atraumatic  Right Ear: External ear normal       Left Ear: External ear normal       Nose: Nose normal       Mouth/Throat:      Mouth: Mucous membranes are moist    Eyes:      Extraocular Movements: Extraocular movements intact  Conjunctiva/sclera: Conjunctivae normal       Pupils: Pupils are equal, round, and reactive to light  Cardiovascular:      Rate and Rhythm: Normal rate and regular rhythm  Pulses: Normal pulses  Heart sounds: No murmur heard  Pulmonary:      Effort: Pulmonary effort is normal  No respiratory distress  Breath sounds: Normal breath sounds  Abdominal:      General: Abdomen is flat  Palpations: Abdomen is soft  Tenderness: There is no abdominal tenderness  There is no guarding or rebound  Musculoskeletal:         General: No swelling, tenderness or deformity  Normal range of motion  Cervical back: Normal range of motion and neck supple  Skin:     General: Skin is warm and dry  Capillary Refill: Capillary refill takes less than 2 seconds  Neurological:      General: No focal deficit present  Mental Status: He is alert and oriented to person, place, and time     Psychiatric:         Mood and Affect: Mood normal          Behavior: Behavior normal          Vital Signs  ED Triage Vitals [09/10/22 0323]   Temperature Pulse Respirations Blood Pressure SpO2   98 7 °F (37 1 °C) 67 (!) 24 112/55 92 % Temp src Heart Rate Source Patient Position - Orthostatic VS BP Location FiO2 (%)   -- Monitor -- -- --      Pain Score       4           Vitals:    09/10/22 0351 09/10/22 0430 09/10/22 0500 09/10/22 0643   BP: 122/65 125/56 135/60 115/54   Pulse: 70 67 66 64         Visual Acuity      ED Medications  Medications - No data to display    Diagnostic Studies  Results Reviewed     Procedure Component Value Units Date/Time    HS Troponin I 2hr [[id number]]  (Normal) Collected: 09/10/22 0605    Lab Status: Final result Specimen: Blood from Arm, Right Updated: 09/10/22 0635     hs TnI 2hr 12 ng/L      Delta 2hr hsTnI -1 ng/L     HS Troponin 0hr (reflex protocol) [[id number]]  (Normal) Collected: 09/10/22 0345    Lab Status: Final result Specimen: Blood from Arm, Right Updated: 09/10/22 0414     hs TnI 0hr 13 ng/L     Comprehensive metabolic panel [[id number]]  (Abnormal) Collected: 09/10/22 0345    Lab Status: Final result Specimen: Blood from Arm, Right Updated: 09/10/22 0407     Sodium 141 mmol/L      Potassium 3 7 mmol/L      Chloride 101 mmol/L      CO2 34 mmol/L      ANION GAP 6 mmol/L      BUN 21 mg/dL      Creatinine 1 21 mg/dL      Glucose 102 mg/dL      Calcium 9 3 mg/dL      AST 24 U/L      ALT 21 U/L      Alkaline Phosphatase 67 U/L      Total Protein 6 5 g/dL      Albumin 4 1 g/dL      Total Bilirubin 0 51 mg/dL      eGFR 56 ml/min/1 73sq m     Narrative:      Ginette guidelines for Chronic Kidney Disease (CKD):     Stage 1 with normal or high GFR (GFR > 90 mL/min/1 73 square meters)    Stage 2 Mild CKD (GFR = 60-89 mL/min/1 73 square meters)    Stage 3A Moderate CKD (GFR = 45-59 mL/min/1 73 square meters)    Stage 3B Moderate CKD (GFR = 30-44 mL/min/1 73 square meters)    Stage 4 Severe CKD (GFR = 15-29 mL/min/1 73 square meters)    Stage 5 End Stage CKD (GFR <15 mL/min/1 73 square meters)  Note: GFR calculation is accurate only with a steady state creatinine    CBC and differential [[id number]]  (Abnormal) Collected: 09/10/22 0345    Lab Status: Final result Specimen: Blood from Arm, Right Updated: 09/10/22 0351     WBC 10 80 Thousand/uL      RBC 5 28 Million/uL      Hemoglobin 16 9 g/dL      Hematocrit 54 3 %       fL      MCH 32 0 pg      MCHC 31 1 g/dL      RDW 13 9 %      MPV 10 4 fL      Platelets 544 Thousands/uL      nRBC 0 /100 WBCs      Neutrophils Relative 72 %      Immat GRANS % 0 %      Lymphocytes Relative 13 %      Monocytes Relative 12 %      Eosinophils Relative 2 %      Basophils Relative 1 %      Neutrophils Absolute 7 80 Thousands/µL      Immature Grans Absolute 0 03 Thousand/uL      Lymphocytes Absolute 1 43 Thousands/µL      Monocytes Absolute 1 30 Thousand/µL      Eosinophils Absolute 0 19 Thousand/µL      Basophils Absolute 0 05 Thousands/µL                  XR chest 1 view portable   ED Interpretation by Tonya Salcido MD (09/10 2529)   No acute cardiopulmonary abnormality      Final Result by Margi Gilliam MD (09/10 1323)      No acute cardiopulmonary disease                    Workstation performed: [id number]                    Procedures  Procedures         ED Course  ED Course as of 09/10/22 2106   Sat Sep 10, 2022   0350 EKG interpreted by myself demonstrates sinus rhythm with occasional PVC, rate of 78, normal axis, prolonged QT, normal ST segment and T-waves   0424 Repeat EKG interpreted by myself demonstrates normal sinus rhythm with a rate of 69, normal axis, normal intervals, occasional PVC, normal ST segment and T-waves   0625 Repeat EKG interpreted by myself demonstrates normal sinus rhythm with a rate of 64, normal axis, normal intervals, no significant ST segment or T-wave changes             HEART Risk Score    Flowsheet Row Most Recent Value   Heart Score Risk Calculator    History 1 Filed at: 09/10/2022 0455   ECG 0 Filed at: 09/10/2022 0455   Age 2 Filed at: 09/10/2022 0455   Risk Factors 2 Filed at: 09/10/2022 0710 Troponin 1 Filed at: 09/10/2022 0455   HEART Score 6 Filed at: 09/10/2022 0455                        SBIRT 20yo+    Flowsheet Row Most Recent Value   SBIRT (25 yo +)    In order to provide better care to our patients, we are screening all of our patients for alcohol and drug use  Would it be okay to ask you these screening questions? No Filed at: 09/10/2022 0345   Initial Alcohol Screen: US AUDIT-C     1  How often do you have a drink containing alcohol? 0 Filed at: 09/10/2022 0345   2  How many drinks containing alcohol do you have on a typical day you are drinking? 0 Filed at: 09/10/2022 0345   3a  Male UNDER 65: How often do you have five or more drinks on one occasion? 0 Filed at: 09/10/2022 0345   3b  FEMALE Any Age, or MALE 65+: How often do you have 4 or more drinks on one occassion? 0 Filed at: 09/10/2022 0345   Audit-C Score 0 Filed at: 09/10/2022 0345   CRISTOPHER: How many times in the past year have you    Used an illegal drug or used a prescription medication for non-medical reasons? Never Filed at: 09/10/2022 0345                    MDM  Number of Diagnoses or Management Options  Chest pain  Diagnosis management comments: 78-year-old male with significant cardiac history presents the ED for evaluation of chest pain  On exam he is uncomfortable appearing, but in no acute distress  Will check cardiac workup  Labs including delta troponin unremarkable, heart score is 6  Patient already has follow-up scheduled with Cardiology  He will be discharged home  He was given strict return precautions             Amount and/or Complexity of Data Reviewed  Clinical lab tests: ordered and reviewed  Tests in the radiology section of CPT®: ordered and reviewed  Review and summarize past medical records: yes        Disposition  Final diagnoses:   Chest pain     Time reflects when diagnosis was documented in both MDM as applicable and the Disposition within this note     Time User Action Codes Description Comment 9/10/2022  4:55 AM Check, Glorious Andrésia Add [R07 9] Chest pain       ED Disposition     ED Disposition   Discharge    Condition   Stable    Date/Time   Sat Sep 10, 2022  6:52 AM    Comment   Gilberto Walters discharge to home/self care                 Follow-up Information     Follow up With Specialties Details Why [address] Emergency Department Emergency Medicine  If symptoms worsen [address] Chidi Funes [id number]  [facility] Emergency Department, [address] [place] [address] Russell Cevallos, [address] [facility] DO [facility] Schedule an appointment as soon as possible for a visit   [address]  [phone]             Discharge Medication List as of 9/10/2022  6:53 AM      CONTINUE these medications which have NOT CHANGED    Details   albuterol (2 5 mg/3 mL) 0 083 % nebulizer solution Take 3 mL (2 5 mg total) by nebulization every 6 (six) hours as needed for wheezing or shortness of breath, Starting Wed 5/25/2022, Normal      Ascorbic Acid (vitamin C) 1000 MG tablet Take 1,000 mg by mouth daily, Historical Med      aspirin 81 mg chewable tablet CHEW ONE TABLET BY MOUTH EVERY DAY, Historical Med      azithromycin (ZITHROMAX) 250 mg tablet Take 1 every Monday, Wednesday and Friday , Normal      Blood Pressure Monitor KIT Use daily, Starting Tue 11/2/2021, Print      famotidine (PEPCID) 20 mg tablet Take 20 mg by mouth daily at bedtime, Historical Med      fluticasone (FLONASE) 50 mcg/act nasal spray into each nostril as needed , Historical Med      fluticasone-salmeterol (Advair) 500-50 mcg/dose inhaler Inhale 1 puff in the morning Rinse mouth after use , Historical Med      furosemide (LASIX) 40 mg tablet Take 1 tablet (40 mg total) by mouth daily, Starting Thu 1/20/2022, Normal      gabapentin (NEURONTIN) 300 mg capsule 1 PO QHS x 1 day, then 1 PO BID x 1 day, then 1 PO TID, Normal      methocarbamol (ROBAXIN) 500 mg tablet Take 1 tablet (500 mg total) by mouth 3 (three) times a day as needed for muscle spasms, Starting Wed 4/27/2022, Normal      !! metoprolol succinate (TOPROL-XL) 25 mg 24 hr tablet Take 1 tablet (25 mg total) by mouth daily, Starting Thu 7/14/2022, Normal      !! metoprolol succinate (TOPROL-XL) 50 mg 24 hr tablet Take 1 tablet (50 mg total) by mouth 2 (two) times a day, Starting Mon 3/14/2022, Normal      Multiple Vitamin (multivitamin) tablet Take 1 tablet by mouth daily, Historical Med      nitroglycerin (NITROSTAT) 0 4 mg SL tablet DISSOLVE ONE TABLET UNDER THE TONGUE  PRN, Historical Med      potassium chloride (Klor-Con) 10 mEq tablet Take 2 tablets (20 mEq total) by mouth daily, Starting Thu 1/20/2022, Normal      rosuvastatin (CRESTOR) 40 MG tablet Take 1 tablet (40 mg total) by mouth daily, Starting Thu 1/20/2022, Normal      Tiotropium Bromide Monohydrate (SPIRIVA RESPIMAT IN) Inhale every morning, Historical Med       !! - Potential duplicate medications found  Please discuss with provider  No discharge procedures on file      PDMP Review       Value Time User    PDMP Reviewed  Yes 7/22/2022  8:46 AM Doll Hammans, PA-C          ED Provider  Electronically Signed by           Elvis Quintanilla MD  09/10/22 3095

## 2022-09-10 NOTE — ED NOTES
20g in right AC removed at this time       Ronny Singleton, [facility]  09/10/22 3691

## 2022-09-13 ENCOUNTER — HOSPITAL ENCOUNTER (OUTPATIENT)
Facility: HOSPITAL | Age: 77
Setting detail: OUTPATIENT SURGERY
Discharge: HOME | End: 2022-09-13
Attending: UROLOGY | Admitting: UROLOGY
Payer: COMMERCIAL

## 2022-09-13 ENCOUNTER — ANESTHESIA (OUTPATIENT)
Dept: GASTROENTEROLOGY | Facility: HOSPITAL | Age: 77
End: 2022-09-13
Payer: COMMERCIAL

## 2022-09-13 VITALS
DIASTOLIC BLOOD PRESSURE: 66 MMHG | TEMPERATURE: 97.3 F | RESPIRATION RATE: 17 BRPM | HEART RATE: 71 BPM | SYSTOLIC BLOOD PRESSURE: 131 MMHG | OXYGEN SATURATION: 99 %

## 2022-09-13 DIAGNOSIS — R97.20 ELEVATED PROSTATE SPECIFIC ANTIGEN (PSA): ICD-10-CM

## 2022-09-13 PROCEDURE — 55700 PR BIOPSY OF PROSTATE,NEEDLE/PUNCH: CPT | Performed by: UROLOGY

## 2022-09-13 PROCEDURE — G0416 PROSTATE BIOPSY, ANY MTHD: HCPCS | Performed by: PATHOLOGY

## 2022-09-13 PROCEDURE — 88344 IMHCHEM/IMCYTCHM EA MLT ANTB: CPT | Performed by: PATHOLOGY

## 2022-09-13 PROCEDURE — NC001 PR NO CHARGE: Performed by: UROLOGY

## 2022-09-13 RX ORDER — SODIUM CHLORIDE 9 MG/ML
INJECTION, SOLUTION INTRAVENOUS CONTINUOUS PRN
Status: DISCONTINUED | OUTPATIENT
Start: 2022-09-13 | End: 2022-09-13

## 2022-09-13 RX ORDER — HYDROCODONE BITARTRATE AND ACETAMINOPHEN 5; 325 MG/1; MG/1
1 TABLET ORAL EVERY 6 HOURS PRN
Status: DISCONTINUED | OUTPATIENT
Start: 2022-09-13 | End: 2022-09-13 | Stop reason: HOSPADM

## 2022-09-13 RX ORDER — CEFAZOLIN SODIUM 2 G/50ML
2000 SOLUTION INTRAVENOUS ONCE
Status: COMPLETED | OUTPATIENT
Start: 2022-09-13 | End: 2022-09-13

## 2022-09-13 RX ORDER — EPHEDRINE SULFATE 50 MG/ML
INJECTION INTRAVENOUS AS NEEDED
Status: DISCONTINUED | OUTPATIENT
Start: 2022-09-13 | End: 2022-09-13

## 2022-09-13 RX ORDER — PROPOFOL 10 MG/ML
INJECTION, EMULSION INTRAVENOUS CONTINUOUS PRN
Status: DISCONTINUED | OUTPATIENT
Start: 2022-09-13 | End: 2022-09-13

## 2022-09-13 RX ORDER — LIDOCAINE HYDROCHLORIDE 10 MG/ML
INJECTION, SOLUTION EPIDURAL; INFILTRATION; INTRACAUDAL; PERINEURAL AS NEEDED
Status: DISCONTINUED | OUTPATIENT
Start: 2022-09-13 | End: 2022-09-13

## 2022-09-13 RX ORDER — LIDOCAINE HYDROCHLORIDE 20 MG/ML
INJECTION, SOLUTION EPIDURAL; INFILTRATION; INTRACAUDAL; PERINEURAL AS NEEDED
Status: DISCONTINUED | OUTPATIENT
Start: 2022-09-13 | End: 2022-09-13 | Stop reason: HOSPADM

## 2022-09-13 RX ORDER — PROPOFOL 10 MG/ML
INJECTION, EMULSION INTRAVENOUS AS NEEDED
Status: DISCONTINUED | OUTPATIENT
Start: 2022-09-13 | End: 2022-09-13

## 2022-09-13 RX ORDER — BUPIVACAINE HYDROCHLORIDE 5 MG/ML
INJECTION, SOLUTION EPIDURAL; INTRACAUDAL AS NEEDED
Status: DISCONTINUED | OUTPATIENT
Start: 2022-09-13 | End: 2022-09-13 | Stop reason: HOSPADM

## 2022-09-13 RX ORDER — FENTANYL CITRATE 50 UG/ML
INJECTION, SOLUTION INTRAMUSCULAR; INTRAVENOUS AS NEEDED
Status: DISCONTINUED | OUTPATIENT
Start: 2022-09-13 | End: 2022-09-13

## 2022-09-13 RX ADMIN — SODIUM CHLORIDE: 0.9 INJECTION, SOLUTION INTRAVENOUS at 11:47

## 2022-09-13 RX ADMIN — EPHEDRINE SULFATE 5 MG: 50 INJECTION INTRAVENOUS at 12:09

## 2022-09-13 RX ADMIN — PROPOFOL 50 MCG/KG/MIN: 10 INJECTION, EMULSION INTRAVENOUS at 11:55

## 2022-09-13 RX ADMIN — PROPOFOL 70 MG: 10 INJECTION, EMULSION INTRAVENOUS at 11:55

## 2022-09-13 RX ADMIN — EPHEDRINE SULFATE 5 MG: 50 INJECTION INTRAVENOUS at 12:11

## 2022-09-13 RX ADMIN — LIDOCAINE HYDROCHLORIDE 50 MG: 10 INJECTION, SOLUTION EPIDURAL; INFILTRATION; INTRACAUDAL; PERINEURAL at 11:55

## 2022-09-13 RX ADMIN — CEFAZOLIN SODIUM 2000 MG: 2 SOLUTION INTRAVENOUS at 11:19

## 2022-09-13 RX ADMIN — FENTANYL CITRATE 25 MCG: 50 INJECTION INTRAMUSCULAR; INTRAVENOUS at 12:03

## 2022-09-13 NOTE — ANESTHESIA POSTPROCEDURE EVALUATION
Post-Op Assessment Note    CV Status:  Stable    Pain management: adequate     Mental Status:  Alert and awake   Hydration Status:  Stable   PONV Controlled:  Controlled   Airway Patency:  Patent      Post Op Vitals Reviewed: Yes      Staff: CRNA         No complications documented      /63 (09/13/22 1226)    Temp (!) 97 3 °F (36 3 °C) (09/13/22 1226)    Pulse 75 (09/13/22 1226)   Resp 16 (09/13/22 1226)    SpO2 99 % (09/13/22 1226)

## 2022-09-13 NOTE — H&P
H&P Exam - Urology   [facility] 1945 [id number]      Assessment/Plan     Assessment:  Elevated PSA of 12 8 6/2/2022  Category 4 lesion anterior right peripheral zone at the apex  Plan:  MR fusion transperineal prostate biopsy  History of Present Illness   HPI:  The patient presents for the above procedure  The risks of bleeding, infection, urinary retention and need for additional procedures has been explained and informed consent given  Past Medical History:   Diagnosis Date    Bilateral carotid artery stenosis     Chronic diastolic heart failure (HCC)     Chronic ischemic heart disease     Colon polyp     COPD (chronic obstructive pulmonary disease) (HCC)     Coronary artery disease     hx stents, MI, PCI    CPAP (continuous positive airway pressure) dependence     Hearing loss     Hyperlipidemia     Hypertension     [facility]    [facility]     Pneumonia     S/P carotid endarterectomy     Shortness of breath     Sleep apnea     Sleep apnea, obstructive     Stented coronary artery        Past Surgical History:   Procedure Laterality Date    APPENDECTOMY      CARDIAC CATHETERIZATION  03/18/2021    left main with no significant disease, proximal LAD with 10% stenosis at the site of prior stent, left circumflex artery with minimal luminal irregularities mid RCA with 50% stenosis at site of prior stent and PL segment with distal disease supplied by collaterals from the distal circumflex with no significant CAD requiring revascularization at that time      COLONOSCOPY      CORONARY ANGIOPLASTY WITH STENT PLACEMENT  1999    RCA    CORONARY ANGIOPLASTY WITH STENT PLACEMENT  2001    RCA    CORONARY ANGIOPLASTY WITH STENT PLACEMENT  2003    LAD    EYE SURGERY      MS REVISE MEDIAN N/CARPAL TUNNEL SURG Left 7/22/2022    Procedure: RELEASE CARPAL TUNNEL;  Surgeon: Dominique Alvarado MD;  [facility];  Service: Orthopedics    MI REVISE ULNAR NERVE AT ELBOW Left 7/22/2022    Procedure: RELEASE CUBITAL TUNNEL;  Surgeon: Wilson Alcala MD;  [facility];  Service: Orthopedics    MI REVISE ULNAR NERVE AT WRIST Left 7/22/2022    Procedure: Farnaz Quintero RELEASE;  Surgeon: Wilson Alcala MD;  [facility];  Service: Orthopedics    SKIN CANCER EXCISION  2012    chin-per pt, basal cell           Review of systems:    General: No fever  CVS: No chest pain or new SOB  Abdomen: No diarrhea or blood in stool  : No new voiding difficulties  Neuro: No syncope/weakness/loss of sensation/paresis  Ophthalmologic: No new visual changes  Ortho: No new back/joint pains  Social History- as per previous notes  Family History: non-contributory    Meds/Allergies   PTA meds:   Prior to Admission Medications   Prescriptions Last Dose Informant Patient Reported? Taking? Ascorbic Acid (vitamin C) 1000 MG tablet   Yes No   Sig: Take 1,000 mg by mouth daily   Patient not taking: No sig reported   Blood Pressure Monitor KIT  Self No No   Sig: Use daily   Multiple Vitamin (multivitamin) tablet   Yes No   Sig: Take 1 tablet by mouth daily   Tiotropium Bromide Monohydrate (SPIRIVA RESPIMAT IN)  Self Yes No   Sig: Inhale every morning   albuterol (2 5 mg/3 mL) 0 083 % nebulizer solution   No No   Sig: Take 3 mL (2 5 mg total) by nebulization every 6 (six) hours as needed for wheezing or shortness of breath   aspirin 81 mg chewable tablet  Self Yes No   Sig: CHEW ONE TABLET BY MOUTH EVERY DAY   azithromycin (ZITHROMAX) 250 mg tablet   No No   Sig: Take 1 every Monday, Wednesday and Friday  Patient taking differently: 250 mg Take 1 every Monday, Wednesday and Friday     famotidine (PEPCID) 20 mg tablet   Yes No   Sig: Take 20 mg by mouth daily at bedtime   fluticasone (FLONASE) 50 mcg/act nasal spray  Self Yes No   Sig: into each nostril as needed    fluticasone-salmeterol (Advair) 500-50 mcg/dose inhaler  Self Yes No   Sig: Inhale 1 puff in the morning Rinse mouth after use  furosemide (LASIX) 40 mg tablet  Self No No   Sig: Take 1 tablet (40 mg total) by mouth daily   gabapentin (NEURONTIN) 300 mg capsule   No No   Si PO QHS x 1 day, then 1 PO BID x 1 day, then 1 PO TID   methocarbamol (ROBAXIN) 500 mg tablet   No No   Sig: Take 1 tablet (500 mg total) by mouth 3 (three) times a day as needed for muscle spasms   metoprolol succinate (TOPROL-XL) 25 mg 24 hr tablet   No No   Sig: Take 1 tablet (25 mg total) by mouth daily   Patient taking differently: Take 75 mg by mouth daily in the early morning   metoprolol succinate (TOPROL-XL) 50 mg 24 hr tablet   No No   Sig: Take 1 tablet (50 mg total) by mouth 2 (two) times a day   Patient taking differently: Take 50 mg by mouth daily at bedtime   nitroglycerin (NITROSTAT) 0 4 mg SL tablet  Self Yes No   Sig: DISSOLVE ONE TABLET UNDER THE TONGUE  PRN   potassium chloride (Klor-Con) 10 mEq tablet  Self No No   Sig: Take 2 tablets (20 mEq total) by mouth daily   rosuvastatin (CRESTOR) 40 MG tablet   No No   Sig: Take 1 tablet (40 mg total) by mouth daily   Patient not taking: Reported on 2022      Facility-Administered Medications: None         Objective   Vitals: There were no vitals taken for this visit  No intake/output data recorded  Physical Exam:    Awake, alert, in NAD  HEENT: Atraumatic, Normocephalic    Lungs: Normal Respiratory effort, no wheezes,stridor or rales  CVS- RRR    Abdomen: Nondistended  Extremiites: Normal ROM, no cyanosis/edema  Lab Results: I have personally reviewed pertinent reports  Imaging: I have personally reviewed pertinent reports

## 2022-09-13 NOTE — ANESTHESIA PREPROCEDURE EVALUATION
Procedure:  TRANSPERINEAL MRI FUSION  BIOPSY PROSTATE (N/A Perineum)    Relevant Problems   CARDIO   (+) Bilateral carotid artery stenosis   (+) Chronic right-sided thoracic back pain   (+) Coronary artery disease involving native coronary artery of native heart without angina pectoris   (+) Costochondral chest pain   (+) Hyperlipidemia   (+) Hypertension   (+) Intercostal neuralgia   (+) Intermittent chest pain   (+) Occlusion and stenosis of carotid artery   (+) Rib pain   (+) Stented coronary artery      GI/HEPATIC   (+) Gastroesophageal reflux disease      /RENAL   (+) Stage 3a chronic kidney disease (HCC)      MUSCULOSKELETAL   (+) Chronic bilateral low back pain with right-sided sciatica   (+) Chronic right-sided thoracic back pain   (+) Mid back pain   (+) Osteoarthritis of hip   (+) Osteoarthritis of spinal facet joint      NEURO/PSYCH   (+) Chronic bilateral low back pain with right-sided sciatica   (+) Chronic pain syndrome   (+) Chronic right-sided thoracic back pain   (+) Depression   (+) Visual disturbance      PULMONARY   (+) Chronic obstructive pulmonary disease (COPD) (HCC)   (+) Oxygen dependent   (+) Sleep apnea      Cardiovascular and Mediastinum   (+) Chronic diastolic (congestive) heart failure (HCC)   (+) Chronic ischemic heart disease      Respiratory   (+) Hypoxia      Digestive   (+) Diverticular disease of colon      Endocrine   (+) Multinodular goiter   (+) Thyroid nodule      Nervous and Auditory   (+) Guyon syndrome, left   (+) Hearing loss   (+) Lumbar radiculopathy   (+) Thoracic radiculopathy      Other   (+) Abdominal bloating   (+) Abnormal nuclear stress test   (+) Anisometropia   (+) Chronic hypokalemia   (+) Cough   (+) Discoloration of skin   (+) Dry eye syndrome   (+) Generalized abdominal pain   (+) Hyperglycemia   (+) Incomplete bladder emptying   (+) Lens replaced by other means   (+) Lightheadedness   (+) Nuclear senile cataract   (+) Obesity   (+) Prediabetes   (+) Primary insomnia   (+) Right flank pain   (+) S/P carotid endarterectomy   (+) Solitary pulmonary nodule   (+) Urinary frequency   (+) Vertigo      Lab Results   Component Value Date    SODIUM 141 09/10/2022    K 3 7 09/10/2022     09/10/2022    CO2 34 (H) 09/10/2022    AGAP 6 09/10/2022    BUN 21 09/10/2022    CREATININE 1 21 09/10/2022    GLUC 102 09/10/2022    GLUF 91 09/06/2022    CALCIUM 9 3 09/10/2022    AST 24 09/10/2022    ALT 21 09/10/2022    ALKPHOS 67 09/10/2022    TP 6 5 09/10/2022    TBILI 0 51 09/10/2022    EGFR 56 09/10/2022     Lab Results   Component Value Date    WBC 10 80 (H) 09/10/2022    HGB 16 9 09/10/2022    HCT 54 3 (H) 09/10/2022     (H) 09/10/2022     09/10/2022            Anesthesia Plan  ASA Score- 2     Anesthesia Type- IV sedation with anesthesia with ASA Monitors  Additional Monitors:   Airway Plan:           Plan Factors-Exercise tolerance (METS): >4 METS  Chart reviewed  EKG reviewed  Imaging results reviewed  Existing labs reviewed  Patient summary reviewed              Induction-     Postoperative Plan-     Informed Consent-

## 2022-09-13 NOTE — OP NOTE
OPERATIVE REPORT  PATIENT NAME: Megan Berry    :  1945  MRN: [id number]  Pt Location: [facility] 01    SURGERY DATE: 2022    Surgeon(s) and Role:     Isabelle Bowling MD - Primary    Preop Diagnosis:  Elevated prostate specific antigen (PSA) [R97 20]    Post-Op Diagnosis Codes:     * Elevated prostate specific antigen (PSA) [R97 20]    Procedure:  Transperineal prostate biopsy with transrectal ultrasound guidance    Specimen(s):  ID Type Source Tests Collected by Time Destination   1 : SUZY - Right anterior medial x 2 Tissue Prostate TISSUE EXAM Marques Malone MD 2022 1155    2 : right anterior lateral  Tissue Prostate TISSUE EXAM Marques Malone MD 2022 1155    3 : left anterior medial  Tissue Prostate TISSUE EXAM Marques Malone MD 2022 1155    4 : left anterior lateral  Tissue Prostate TISSUE EXAM Marques Malone MD 2022 1155    5 : left posterior lateral  Tissue Prostate TISSUE EXAM Marques Malone MD 2022 1155    6 : left posterior medial  Tissue Prostate TISSUE EXAM Marques Malone MD 2022 1155    7 : left base  Tissue Prostate TISSUE EXAM Marques Malone MD 2022 1155    8 : right posterior lateral  Tissue Prostate TISSUE EXAM Marques Malone MD 2022 1155    9 : right posterior medial  Tissue Prostate TISSUE EXAM Marques Malone MD 2022 1155    10 : right base  Tissue Prostate TISSUE EXAM Marques Malone MD 2022 1155        Estimated Blood Loss:   Minimal    Drains:  * No LDAs found *    Anesthesia Type:   IV Sedation with Anesthesia    Operative Indications:  Elevated prostate specific antigen (PSA) [R97 20]  Category 4 lesion in noncontrast MRI the prostate 1 5 Delia anterior right peripheral zone apex, no marker so had to do cognitive biopsy    Operative Findings:  Unable to do fusion biopsy due to lack of DynaCAD marking    Extra biopsies taken of anterior right peripheral zone at the apex, which was labeled region of interest in the right anterior medial     Complications:   None    Procedure and Technique:  The patient is  is here for transperineal prostate biopsy guided by biplanar transrectal ultrasound for elevated PSA and abnormal MRI of the prostate but this was only 1 5 Delia MRI without contrast    The procedure, as well as the risks of bleeding, infection, and urinary retention have been explained he gives informed consent  The patient was brought to the operating room and identified properly  IV sedation was induced, and he was placed in the dorsal lithotomy position  The perineum was shaved, a ChloraPrep scrub was used of the perineum and anal regions, and dried  A large Tegaderm was used to tape the scrotum in a cranial direction to keep it out of the way the perineum  A time-out was performed  Care was taken when placing the patient in the dorsal lithotomy position to make sure all pressure points were protected     I then prepared the biplanar ultrasound probe with ultrasound gel covering mostly the distal sensor, and covered it with a condom  The condom was secured , and I placed the transrectal ultrasound probe into the rectum and visualized the prostate in sagittal and transverse views  The perineum was anesthetized with a mix of 1% xylocaine plain and 0 5% bupivacaine plain 10 cc both superficially and deep with a spinal needle on both sides of the median raphe, 10 cc given on each side  I placed a total of 4 14 gauge Angiocath into the perineum, 1 on each side of the perineum approximately 1 5 in from the anus, then a bottom row approximately 3/4 of an inch below the top row  I first started with the region of interest doing a cognitive biopsy anterior right peripheral zone at the apex, which basically was right anterior medial   I then, starting with the right side, rotated the probe to the patient's right at a 45 degree angle and placed the biopsy needle into the upper right angiocath    I performed the same procedure over on the left  I then switched to the lower row of angio caths, then under biplanar guidance I took 1 specimen each from the right and left posterior lateral regions, and took biopsies from the posterior medial and base on each side  This was done bilaterally  These were all peripheral zone biopsies  Extra biopsies were taken in zones where the initial biopsy was suboptimal tissue  Care was taken to try to include the entire length of the prostate as much as possible for complete coverage  Excellent prostate tissue cores were obtained, and specimens were sent to pathology  I withdrew the guide needle and I held pressure on the perineum for 1 minutes using a folded towel  The perineum was then cleansed with sterile water     I was present for the entire procedure and A qualified resident physician was not available    Patient Disposition:  PACU  and hemodynamically stable        SIGNATURE: [unfilled]  DATE: September 13, 2022  TIME: 12:19 PM

## 2022-09-15 PROCEDURE — 88344 IMHCHEM/IMCYTCHM EA MLT ANTB: CPT | Performed by: PATHOLOGY

## 2022-09-15 PROCEDURE — G0416 PROSTATE BIOPSY, ANY MTHD: HCPCS | Performed by: PATHOLOGY

## 2022-09-16 ENCOUNTER — TELEPHONE (OUTPATIENT)
Dept: UROLOGY | Facility: CLINIC | Age: 77
End: 2022-09-16

## 2022-09-16 NOTE — TELEPHONE ENCOUNTER
Called the patient and asked about rescheduling his appointment from 10/4/2022 to 9/20/2022  Patient aware of his pathology results via My Chart  Patient scheduled 9/20/2022 at 1015 am at the [facility] office

## 2022-09-16 NOTE — TELEPHONE ENCOUNTER
----- Message from Linda Escobar MD sent at 9/15/2022  1:40 PM EDT -----  Any sooner 30min appt than 10/4 for path review?    ----- Message -----  From: Lab, Background User  Sent: 9/15/2022   1:39 PM EDT  To: Linda Escobar MD

## 2022-09-19 ENCOUNTER — TELEPHONE (OUTPATIENT)
Dept: PAIN MEDICINE | Facility: MEDICAL CENTER | Age: 77
End: 2022-09-19

## 2022-09-19 DIAGNOSIS — M54.6 CHRONIC RIGHT-SIDED THORACIC BACK PAIN: ICD-10-CM

## 2022-09-19 DIAGNOSIS — G89.29 CHRONIC RIGHT-SIDED THORACIC BACK PAIN: ICD-10-CM

## 2022-09-19 DIAGNOSIS — G58.8 INTERCOSTAL NEURALGIA: ICD-10-CM

## 2022-09-19 DIAGNOSIS — G89.4 CHRONIC PAIN SYNDROME: ICD-10-CM

## 2022-09-19 RX ORDER — GABAPENTIN 300 MG/1
CAPSULE ORAL
Qty: 90 CAPSULE | Refills: 1 | OUTPATIENT
Start: 2022-09-19

## 2022-09-19 NOTE — TELEPHONE ENCOUNTER
Patient requesting refill(s) of: Gabapentin 30 day supply      Last filled: 7/20/2022  Last appt:9/8/22  Next appt:10/24/2022    Pharmacy: [facility] in [facility] [facility]    PT also requesting 90 day to be sent to his mail in pharmacy

## 2022-09-19 NOTE — TELEPHONE ENCOUNTER
Pt contacted Call Center requested refill of their medication  Medication Name:gabapentin (NEURONTIN)          Dosage of Med: 300 mg       Frequency of Med:  1 PO QHS x 1 day, then 1 PO BID x 1 day, then 1 PO TID      Remaining Medication: 0 left       Pharmacy and Location: [facility]        Pt  Preferred Callback Phone Number: [phone]  Thank you

## 2022-09-19 NOTE — TELEPHONE ENCOUNTER
Please see previous request for gabapentin refill and advise- Pt was last seen on 7/20 and has an ov scheduled on 10/19

## 2022-09-20 ENCOUNTER — OFFICE VISIT (OUTPATIENT)
Dept: UROLOGY | Facility: CLINIC | Age: 77
End: 2022-09-20
Payer: COMMERCIAL

## 2022-09-20 VITALS
HEIGHT: 68 IN | DIASTOLIC BLOOD PRESSURE: 62 MMHG | BODY MASS INDEX: 32.58 KG/M2 | SYSTOLIC BLOOD PRESSURE: 122 MMHG | WEIGHT: 215 LBS | HEART RATE: 75 BPM

## 2022-09-20 DIAGNOSIS — C61 PROSTATE CANCER (HCC): Primary | ICD-10-CM

## 2022-09-20 PROCEDURE — 99214 OFFICE O/P EST MOD 30 MIN: CPT | Performed by: UROLOGY

## 2022-09-20 PROCEDURE — 3078F DIAST BP <80 MM HG: CPT | Performed by: UROLOGY

## 2022-09-20 PROCEDURE — 3074F SYST BP LT 130 MM HG: CPT | Performed by: UROLOGY

## 2022-09-20 PROCEDURE — 1160F RVW MEDS BY RX/DR IN RCRD: CPT | Performed by: UROLOGY

## 2022-09-20 RX ORDER — GABAPENTIN 300 MG/1
300 CAPSULE ORAL 3 TIMES DAILY
Qty: 90 CAPSULE | Refills: 1 | Status: SHIPPED | OUTPATIENT
Start: 2022-09-20 | End: 2022-10-13 | Stop reason: SDUPTHER

## 2022-09-20 NOTE — TELEPHONE ENCOUNTER
I cannot send the 90 day supply without discontinuing the prescription sent to his local pharmacy  I would advise that he filled the 30 day supply from his local pharmacy and ask for the 90 day supply at his next visit

## 2022-09-20 NOTE — TELEPHONE ENCOUNTER
S/W pt and advised the same  Pt had not picked up Rx yet, so advised cannot send 90 day supply  Advised can s/w BK next appt

## 2022-09-20 NOTE — TELEPHONE ENCOUNTER
S/W pt and advised rx sent to [facility] with one refill  Pt is now requesting 90 day supply to be sent to his mail order pharmacy, [facility]  Can this be sent without cancelling one month supply to [facility]?     Pt does have procedure with RM scheduled 10/19

## 2022-09-20 NOTE — PROGRESS NOTES
UROLOGY FOLLOWUP NOTE     CHIEF COMPLAINT   Jean Alva is a 65 y o  male with a complaint of   Chief Complaint   Patient presents with    Prostate Cancer       History of Present Illness:     65 y o  male with significant medical comorbidities including coronary artery disease, ischemic heart disease and CHF along with severe COPD and recent pneumonia, oxygen dependent  Patient reports a remote history of an elevated PSA around 7  He discuss his recent was primary care doctor and his PSA was rechecked at 9  Short interval recheck led to a PSA level of 18  Patient reports a history progressive urinary obstructive symptoms over last 6 months with some nocturia and occasional dribbling  He is on Lasix  Presents today for discussion of his PSA  No family history of prostate cancer    Lab Results   Component Value Date    PSA 12 8 (H) 06/02/2022    PSA 18 2 (H) 04/27/2022    PSA 9 1 (H) 04/18/2022     After our initial visit, the patient did undergo a multiparametric MRI  This demonstrated a PI-RADS level 4 lesion  Subsequently underwent a fusion biopsy which demonstrates [place] 4+3=7 disease  Patient returns today discussed findings with his wife      Past Medical History:     Past Medical History:   Diagnosis Date    Bilateral carotid artery stenosis     Chronic diastolic heart failure (HCC)     Chronic ischemic heart disease     Colon polyp     COPD (chronic obstructive pulmonary disease) (HCC)     Coronary artery disease     hx stents, MI, PCI    CPAP (continuous positive airway pressure) dependence     Hearing loss     Hyperlipidemia     Hypertension     MI [facility]myocardial infarction) ([facility] [address] ) 1995    Myocardial infarction ([facility] [address] )     Pneumonia     S/P carotid endarterectomy     Shortness of breath     Sleep apnea     Sleep apnea, obstructive     Stented coronary artery        PAST SURGICAL HISTORY:     Past Surgical History:   Procedure Laterality Date    APPENDECTOMY      CARDIAC CATHETERIZATION  03/18/2021    left main with no significant disease, proximal LAD with 10% stenosis at the site of prior stent, left circumflex artery with minimal luminal irregularities mid RCA with 50% stenosis at site of prior stent and PL segment with distal disease supplied by collaterals from the distal circumflex with no significant CAD requiring revascularization at that time      COLONOSCOPY      CORONARY ANGIOPLASTY WITH STENT PLACEMENT  1999    RCA    CORONARY ANGIOPLASTY WITH STENT PLACEMENT  2001    RCA    CORONARY ANGIOPLASTY WITH STENT PLACEMENT  2003    LAD    EYE SURGERY      MN BIOPSY OF PROSTATE,NEEDLE,TRANSPERINEAL N/A 9/13/2022    Procedure: TRANSPERINEAL MRI FUSION  BIOPSY PROSTATE;  Surgeon: Parish Moise MD;  [facility];  Service: Urology    MN REVISE MEDIAN N/CARPAL TUNNEL SURG Left 7/22/2022    Procedure: RELEASE CARPAL TUNNEL;  Surgeon: Mireille De Luna MD;  [facility];  Service: Orthopedics    MN REVISE ULNAR NERVE AT ELBOW Left 7/22/2022    Procedure: RELEASE CUBITAL TUNNEL;  Surgeon: Mireille De Luna MD;  [facility];  Service: Orthopedics    MN REVISE ULNAR NERVE AT WRIST Left 7/22/2022    Procedure: [place] Card RELEASE;  Surgeon: Mireille De Luna MD;  [facility];  Service: Orthopedics    SKIN CANCER EXCISION  2012    chin-per pt, basal cell       CURRENT MEDICATIONS:     Current Outpatient Medications   Medication Sig Dispense Refill    albuterol (2 5 mg/3 mL) 0 083 % nebulizer solution Take 3 mL (2 5 mg total) by nebulization every 6 (six) hours as needed for wheezing or shortness of breath 60 mL 1    aspirin 81 mg chewable tablet CHEW ONE TABLET BY MOUTH EVERY DAY      Blood Pressure Monitor KIT Use daily (Patient taking differently: Use 40 mg daily) 1 kit 0    famotidine (PEPCID) 20 mg tablet Take 40 mg by mouth daily at bedtime      fluticasone (FLONASE) 50 mcg/act nasal spray into each nostril as needed       fluticasone-salmeterol (Advair) 500-50 mcg/dose inhaler Inhale 1 puff in the morning Rinse mouth after use   furosemide (LASIX) 40 mg tablet Take 1 tablet (40 mg total) by mouth daily 100 tablet 3    gabapentin (NEURONTIN) 300 mg capsule Take 1 capsule (300 mg total) by mouth 3 (three) times a day 90 capsule 1    metoprolol succinate (TOPROL-XL) 25 mg 24 hr tablet Take 1 tablet (25 mg total) by mouth daily (Patient taking differently: Take 75 mg by mouth daily in the early morning) 90 tablet 3    metoprolol succinate (TOPROL-XL) 50 mg 24 hr tablet Take 1 tablet (50 mg total) by mouth 2 (two) times a day (Patient taking differently: Take 50 mg by mouth daily at bedtime) 180 tablet 3    Multiple Vitamin (multivitamin) tablet Take 1 tablet by mouth daily      nitroglycerin (NITROSTAT) 0 4 mg SL tablet DISSOLVE ONE TABLET UNDER THE TONGUE  PRN      potassium chloride (Klor-Con) 10 mEq tablet Take 2 tablets (20 mEq total) by mouth daily 200 tablet 3    rosuvastatin (CRESTOR) 40 MG tablet Take 1 tablet (40 mg total) by mouth daily 100 tablet 3    Tiotropium Bromide Monohydrate (SPIRIVA RESPIMAT IN) Inhale every morning      Ascorbic Acid (vitamin C) 1000 MG tablet Take 1,000 mg by mouth daily (Patient not taking: Reported on 9/20/2022)      azithromycin (ZITHROMAX) 250 mg tablet Take 1 every Monday, Wednesday and Friday  (Patient not taking: Reported on 9/20/2022) 45 tablet 3    methocarbamol (ROBAXIN) 500 mg tablet Take 1 tablet (500 mg total) by mouth 3 (three) times a day as needed for muscle spasms (Patient not taking: Reported on 9/20/2022) 30 tablet 0     No current facility-administered medications for this visit         ALLERGIES:     Allergies   Allergen Reactions    Lisinopril Swelling and Cough    Tetanus Antitoxin Anaphylaxis    Tetanus Toxoid Anaphylaxis and Swelling       SOCIAL HISTORY:     Social History     Socioeconomic History    Marital status: /Civil Union     Spouse name: None    Number of children: None    Years of education: None    Highest education level: None   Occupational History    None   Tobacco Use    Smoking status: Former Smoker     Quit date:      Years since quittin 7    Smokeless tobacco: Never Used   Vaping Use    Vaping Use: Never used   Substance and Sexual Activity    Alcohol use: Not Currently    Drug use: Never    Sexual activity: None   Other Topics Concern    None   Social History Narrative    None     Social Determinants of Health     Financial Resource Strain: Not on file   Food Insecurity: Not on file   Transportation Needs: Not on file   Physical Activity: Not on file   Stress: Not on file   Social Connections: Not on file   Intimate Partner Violence: Not on file   Housing Stability: Not on file       SOCIAL HISTORY:     Family History   Problem Relation Age of Onset    Heart attack Mother     Dementia Mother        REVIEW OF SYSTEMS:     Review of Systems      PHYSICAL EXAM:     /62   Pulse 75   Ht 5' 8" (1 727 m)   Wt 97 5 kg (215 lb)   BMI 32 69 kg/m²     General:  Elderly male  in no acute distress  They have a normal affect  There is not appear to be any gross neurologic defects or abnormalities  HEENT:  Normocephalic, atraumatic  Neck is supple without any palpable lymphadenopathy  Cardiovascular:  Patient has normal palpable distal radial pulses  There is no significant peripheral edema  No JVD is noted  Respiratory:  Coarse audible respirations, nasal cannula oxygen  Abdomen:  Abdomen is without visible incisions, large suprapubic fat pad  Abdomen is soft and nontender  There is no tympany  Inguinal and umbilical hernia are not appreciated      Genitourinary:  Buried phallus, no penile lesions or discharge, no testicular masses or tenderness, no hernias, heart constipated stool in the rectal vault, prostate is smooth    Musculoskeletal:  Patient does not have significant CVA tenderness in the  flank with palpation or percussion  They full range of motion in all 4 extremities  Strength in all 4 extremities appears congruent  Patient is able to ambulate without assistance or difficulty  Dermatologic:  Patient has no skin abnormalities or rashes  LABS:     CBC:   Lab Results   Component Value Date    WBC 10 80 (H) 09/10/2022    HGB 16 9 09/10/2022    HCT 54 3 (H) 09/10/2022     (H) 09/10/2022     09/10/2022       BMP:   Lab Results   Component Value Date    CALCIUM 9 3 09/10/2022    K 3 7 09/10/2022    CO2 34 (H) 09/10/2022     09/10/2022    BUN 21 09/10/2022    CREATININE 1 21 09/10/2022     IMAGIN/11/22  MULTIPARAMETRIC MRI OF THE PROSTATE WITH AND WITHOUT CONTRAST-WITH 3-D POSTPROCESSING      INDICATION:   R97 20: Elevated prostate specific antigen (PSA)      COMPARISON: None     PSA LEVEL: 12 8 ng/ml  2022  PRIOR BIOPSY DATE: No prior biopsy  BIOPSY RESULTS: Not applicable      TECHNIQUE: The following pulse sequences were obtained:  Small field-of-view axial T1-weighted and multiplanar T2-weighted images; DWI axial and ADC map; large field of view axial T2 weighted images; T1w in-phase and opposed-phase axials of entire pelvis   and dynamic 3D T1w axial before and during IV contrast injection         CONTRAST:  Gadobutrol (Gadavist) 9 mL of Gadobutrol injection (SINGLE-DOSE)     TECHNICAL LIMITATIONS: None      FINDINGS:     PROSTATE:     Size: 3 9 x 5 3 x 5 1 cm = 54 8 cc  Post-biopsy hemorrhage:  None  Central gland enlargement (BPH): Mild      Focal lesions - localization as follows:     Lesion: 1       Size: 1 3 x 0 6 x 0 6 cm, 0 24 cc (series 250, image 15; series 5, image 29) Location: Anterior right peripheral zone at apex       T2-weighted images: Score 4: Circumscribed, homogeneous moderate hypointense focus/mass confined to prostate and less than 1 5 cm in greatest dimension         Diffusion-weighted images: Score 4: Focal markedly hypointense on ADC and markedly hyperintense on high b-value DWI; less than 1 5 cm in greatest dimension  Dynamic post-contrast images: (+) Focal, earlier or contemporaneous with, enhancement of adjacent normal prostatic tissues; corresponds to a finding on T2-weighted and/or DWI  PI-RADS Assessment Category: 4, High (clinically significant cancer is likely to be present)  Extra-prostatic extension (EPE): Broadly abuts capsule without visualized gross EPE      SEMINAL VESICLES: Unremarkable     Note: Clinically significant cancer is defined on pathology/histology as Himanshu score greater than or equal to 7, and/or volume of greater than or equal to 0 5 mL, and/or extraprostatic extension      URINARY BLADDER: Unremarkable      LYMPH NODES: No pelvic lymphadenopathy      BONES: No suspicious osseous lesion            IMPRESSION:     1  PI-RADSv2 1 Category 4 -High (clinically significant cancer is likely to be present)  Lesion in the anterior right peripheral zone at apex     2  No extraprostatic tumor, seminal vesicle invasion, pelvic lymphadenopathy, or pelvic osseous metastatic disease      3  Calculated prostate volume of 55 cc  PATHOLOGY:     9/13/22  Final Diagnosis   A  Prostate, Right Anterior Medial (SUZY), Biopsy:  - Prostatic adenocarcinoma, Himanshu Score 4 + 3 = 7, Grade Group III  - Percentage [place] Pattern 4: 60%     - Total number of cores identified: 2     - Total number of cores with carcinoma: 2     - Percentage of tissue with carcinoma: 50%     - Linear amount of tissue with carcinoma: 15 mm total linear length in all cores     B  Prostate, Right Anterior Lateral, Biopsy:  - Prostatic adenocarcinoma, Himanshu Score 3 + 3 = 6, Grade Group I      - Total number of cores identified: 1     - Total number of cores with carcinoma: 1     - Percentage of tissue with carcinoma: 33%     - Linear amount of tissue with carcinoma: 3 mm total     C   Prostate, Left Anterior Medial, Biopsy:  - Benign prostatic tissue      D  Prostate, Left Anterior Lateral, Biopsy:  - Benign prostatic tissue      E  Prostate, Left Posterior Lateral, Biopsy:  - Prostatic adenocarcinoma, [place] Score 3 + 3 = 6, Grade Group I      - Total number of cores identified: 1     - Total number of cores with carcinoma: 1     - Percentage of tissue with carcinoma: 33%     - Linear amount of tissue with carcinoma: 4 mm total  - Prostate triple stain (, p63, P504S) supports the diagnosis  - High-grade prostatic intraepithelial neoplasia (HGPIN)      F  Prostate, Left Posterior Medial, Biopsy:  - Benign prostatic tissue   - Prostate triple stain (, p63, P504S) supports the diagnosis      G  Prostate, Left Base, Biopsy:  - Benign prostatic tissue   - Prostate triple stain (, p63, P504S) supports the diagnosis      H  Prostate, Right Posterior Lateral, Biopsy:  - Prostatic adenocarcinoma, [place] Score 3 + 3 = 6, Grade Group I      - Total number of cores identified: 1     - Total number of cores with carcinoma: 1     - Percentage of tissue with carcinoma: 10%     - Linear amount of tissue with carcinoma: 1 mm total  - High-grade prostatic intraepithelial neoplasia (HGPIN)  - Prostate triple stain (, p63, P504S) supports the diagnosis      I  Prostate, Right Posterior Medial, Biopsy:  - Prostatic adenocarcinoma, Himanshu Score 4 + 3 = 7, Grade Group III  - Percentage [place] Pattern 4: 60%     - Total number of cores identified: 1     - Total number of cores with carcinoma: 1     - Percentage of tissue with carcinoma: 20%     - Linear amount of tissue with carcinoma: 3 mm total  - High-grade prostatic intraepithelial neoplasia (HGPIN)  - Prostate triple stain (, p63, P504S) supports the diagnosis      J  Prostate, Right Base, Biopsy:  - Benign prostatic tissue   - Prostate triple stain (, p63, P504S) supports the diagnosis       ASSESSMENT:     65 y o  male with prostate cancer    PLAN:     Patient has clinical T2 disease with a positive MRI and positive fusion biopsy in the sampled area of concern  Highest [place] scores 4+3=7 unfavorable intermediate risk disease  PSA is elevated above 10  I have reviewed the clinical staging with the patient and his wife  He needs next step staging imaging in the form of a PSMA PET scan especially given the concern for his PSA  We did review his MRI images and the patient does not appear to have pelvic bony disease or lymphadenopathy  Because the patient has significant medical comorbidities both cardiac and pulmonary, surgery is not an option for this patient  We discussed that if his disease is localized on staging imaging, next step would be hormone deprivation therapy and radiation delivered concurrently  We discussed the option of fiducial markers and SpaceOAR perirectal gel  Patient is amenable to the PSMA PET scan and referral to Radiation Oncology

## 2022-09-23 ENCOUNTER — TELEPHONE (OUTPATIENT)
Dept: PAIN MEDICINE | Facility: MEDICAL CENTER | Age: 77
End: 2022-09-23

## 2022-09-23 NOTE — TELEPHONE ENCOUNTER
Patient called stated pulled left side hamstring does not know what to do      Please advise       Patient can be reached at [phone]

## 2022-09-23 NOTE — TELEPHONE ENCOUNTER
S/w pt who states he was walking the dog when his heel went into a hole in the lawn and he twisted his body  Pt has taken gabapentin and methocarbamol as prescribed  Pt advised to use ice 20 mins on and off and rest  Pt does states that the pain seems to have lessened since yesterday but he just woke up  Pt advised to notify PCP or go to urgent care if not improving  Advised will notify RM and Cb with any further recs

## 2022-09-27 PROBLEM — C61 PROSTATE CANCER (HCC): Status: ACTIVE | Noted: 2022-09-27

## 2022-09-29 ENCOUNTER — HOSPITAL ENCOUNTER (OUTPATIENT)
Dept: NON INVASIVE DIAGNOSTICS | Facility: CLINIC | Age: 77
Discharge: HOME | End: 2022-09-29
Payer: COMMERCIAL

## 2022-09-29 VITALS
HEART RATE: 75 BPM | HEIGHT: 68 IN | BODY MASS INDEX: 32.58 KG/M2 | WEIGHT: 215 LBS | DIASTOLIC BLOOD PRESSURE: 62 MMHG | SYSTOLIC BLOOD PRESSURE: 122 MMHG

## 2022-09-29 DIAGNOSIS — I34.0 MITRAL VALVE INSUFFICIENCY, UNSPECIFIED ETIOLOGY: ICD-10-CM

## 2022-09-29 DIAGNOSIS — I49.3 PVC (PREMATURE VENTRICULAR CONTRACTION): ICD-10-CM

## 2022-09-29 DIAGNOSIS — I25.9 CHRONIC ISCHEMIC HEART DISEASE: ICD-10-CM

## 2022-09-29 DIAGNOSIS — I35.0 AORTIC VALVE STENOSIS, ETIOLOGY OF CARDIAC VALVE DISEASE UNSPECIFIED: ICD-10-CM

## 2022-09-29 LAB
AORTIC VALVE MEAN VELOCITY: 13.3 M/S
AV LVOT MEAN GRADIENT: 2 MMHG
AV LVOT PEAK GRADIENT: 5 MMHG
AV MEAN GRADIENT: 8 MMHG
AV PEAK GRADIENT: 15 MMHG
AV VELOCITY RATIO: 0.51
DOP CALC AO PEAK VEL: 2 M/S
DOP CALC AO VTI: 41.27 CM
DOP CALC LVOT PEAK VEL VTI: 21.9 CM
DOP CALC LVOT PEAK VEL: 1.01 M/S
E WAVE DECELERATION TIME: 248 MS
GLOBAL LONGITUIDAL STRAIN: -16 %
IVC: 2.1 MM
MV E'TISSUE VEL-SEP: 7 CM/S
MV PEAK A VEL: 1.25 M/S
MV PEAK E VEL: 63 CM/S
MV STENOSIS PRESSURE HALF TIME: 72 MS
MV VALVE AREA P 1/2 METHOD: 3.06 CM2
RA PRESSURE ESTIMATED: 8 MMHG
RV PSP: 39 MMHG
SL CV LV EF: 45
TR MAX PG: 31 MMHG
TR PEAK VELOCITY: 2.8 M/S
TRICUSPID VALVE PEAK REGURGITATION VELOCITY: 2.8 M/S

## 2022-09-29 PROCEDURE — 93325 DOPPLER ECHO COLOR FLOW MAPG: CPT

## 2022-09-29 PROCEDURE — 93321 DOPPLER ECHO F-UP/LMTD STD: CPT | Performed by: INTERNAL MEDICINE

## 2022-09-29 PROCEDURE — 93308 TTE F-UP OR LMTD: CPT | Performed by: INTERNAL MEDICINE

## 2022-09-29 PROCEDURE — 93321 DOPPLER ECHO F-UP/LMTD STD: CPT

## 2022-09-29 PROCEDURE — 93308 TTE F-UP OR LMTD: CPT

## 2022-09-29 PROCEDURE — 93325 DOPPLER ECHO COLOR FLOW MAPG: CPT | Performed by: INTERNAL MEDICINE

## 2022-09-30 ENCOUNTER — TELEPHONE (OUTPATIENT)
Dept: NON INVASIVE DIAGNOSTICS | Facility: HOSPITAL | Age: 77
End: 2022-09-30

## 2022-09-30 ENCOUNTER — HOSPITAL ENCOUNTER (OUTPATIENT)
Dept: RADIOLOGY | Age: 77
Discharge: HOME | End: 2022-09-30
Payer: COMMERCIAL

## 2022-09-30 DIAGNOSIS — C61 PROSTATE CANCER (HCC): ICD-10-CM

## 2022-09-30 PROCEDURE — A9595 HB PIFLUFOLASTAT F-18, DIAGNOSTIC, 1 MILLICURIE: HCPCS

## 2022-09-30 PROCEDURE — 78815 PET IMAGE W/CT SKULL-THIGH: CPT

## 2022-09-30 NOTE — TELEPHONE ENCOUNTER
Called spoke with patient recent transthoracic echocardiogram results which still show relatively unchanged mildly reduced estimated LVEF at 45% with mild aortic stenosis and mild-to-moderate mitral regurgitation  Patient notes overall he is doing well and is PET scan right now  We will follow-up results and I will see patient as scheduled in the office or sooner if necessary

## 2022-10-05 ENCOUNTER — CLINICAL SUPPORT (OUTPATIENT)
Dept: RADIATION ONCOLOGY | Facility: CLINIC | Age: 77
End: 2022-10-05
Attending: RADIOLOGY
Payer: COMMERCIAL

## 2022-10-05 ENCOUNTER — TELEPHONE (OUTPATIENT)
Dept: RADIATION ONCOLOGY | Facility: CLINIC | Age: 77
End: 2022-10-05

## 2022-10-05 ENCOUNTER — RADIATION ONCOLOGY CONSULT (OUTPATIENT)
Dept: RADIATION ONCOLOGY | Facility: CLINIC | Age: 77
End: 2022-10-05
Attending: RADIOLOGY

## 2022-10-05 VITALS — OXYGEN SATURATION: 90 % | WEIGHT: 218.48 LBS | HEIGHT: 68 IN | BODY MASS INDEX: 33.11 KG/M2

## 2022-10-05 DIAGNOSIS — C61 PROSTATE CANCER (HCC): Primary | ICD-10-CM

## 2022-10-05 DIAGNOSIS — C61 PROSTATE CANCER (HCC): ICD-10-CM

## 2022-10-05 PROCEDURE — 99204 OFFICE O/P NEW MOD 45 MIN: CPT | Performed by: RADIOLOGY

## 2022-10-05 NOTE — PROGRESS NOTES
Consultation - Radiation Oncology     VZS:[id number] : 1945  Encounter: [id number]  Patient Information: Ismael Villalobos  Chief Complaint   Patient presents with    Consult     Cancer Staging  Prostate cancer [facility])  Staging form: Prostate, AJCC 8th Edition  - Clinical stage from 10/5/2022: Stage IIC (cT1c, cN0, cM0, PSA: 18 2, Grade Group: 3) - Signed by Baudilio Palomo MD on 10/5/2022  Histopathologic type: Adenocarcinoma, NOS  Stage prefix: Initial diagnosis  Prostate specific antigen (PSA) range: 10 to 19  Himanshu primary pattern: 4  Himanshu secondary pattern: 3  Himanshu score: 7  Histologic grading system: 5 grade system  Location of positive needle core biopsies: Both sides           History of Present Illness   Rufus Dias is a 65y o  year old male who presents here to discuss RT for his recently diagnosed Himanshu 7(4+3) prostate cancer  Referred by Dr Layo Church  Additional medical problems include COPD, chronic CHF, ischemic heart disease, HTN, occlusion and stenosis of carotid artery            Lab Results   Component Value Date     PSA 12 8 (H) 2022     PSA 18 2 (H) 2022     PSA 9 1 (H) 2022  Dr Layo Church  Seen in consult for elevated PSA  Reports history of urinary problems including nocturia and occasional dribbling for last 6 months  Rectal exam normal  MRI recommended     22  MRI prostate   IMPRESSION:     1  PI-RADSv2 1 Category 4 -High (clinically significant cancer is likely to be present)  Lesion in the anterior right peripheral zone at apex     2   No extraprostatic tumor, seminal vesicle invasion, pelvic lymphadenopathy, or pelvic osseous metastatic disease      3  Calculated prostate volume of 55 cc      22  Tissue Exam  A  Prostate, Right Anterior Medial (SUZY), Biopsy:  - Prostatic adenocarcinoma, [place] Score 4 + 3 = 7, Grade Group III      - Percentage Himanshu Pattern 4: 60%     - Total number of cores identified: 2     - Total number of cores with carcinoma: 2     - Percentage of tissue with carcinoma: 50%     - Linear amount of tissue with carcinoma: 15 mm total linear length in all cores     B  Prostate, Right Anterior Lateral, Biopsy:  - Prostatic adenocarcinoma, [place] Score 3 + 3 = 6, Grade Group I      - Total number of cores identified: 1     - Total number of cores with carcinoma: 1     - Percentage of tissue with carcinoma: 33%     - Linear amount of tissue with carcinoma: 3 mm total     C  Prostate, Left Anterior Medial, Biopsy:  - Benign prostatic tissue      D  Prostate, Left Anterior Lateral, Biopsy:  - Benign prostatic tissue      E  Prostate, Left Posterior Lateral, Biopsy:  - Prostatic adenocarcinoma, [place] Score 3 + 3 = 6, Grade Group I      - Total number of cores identified: 1     - Total number of cores with carcinoma: 1     - Percentage of tissue with carcinoma: 33%     - Linear amount of tissue with carcinoma: 4 mm total  - Prostate triple stain (, p63, P504S) supports the diagnosis  - High-grade prostatic intraepithelial neoplasia (HGPIN)      F  Prostate, Left Posterior Medial, Biopsy:  - Benign prostatic tissue   - Prostate triple stain (, p63, P504S) supports the diagnosis      G  Prostate, Left Base, Biopsy:  - Benign prostatic tissue   - Prostate triple stain (, p63, P504S) supports the diagnosis      H  Prostate, Right Posterior Lateral, Biopsy:  - Prostatic adenocarcinoma, Himanshu Score 3 + 3 = 6, Grade Group I      - Total number of cores identified: 1     - Total number of cores with carcinoma: 1     - Percentage of tissue with carcinoma: 10%     - Linear amount of tissue with carcinoma: 1 mm total  - High-grade prostatic intraepithelial neoplasia (HGPIN)  - Prostate triple stain (, p63, P504S) supports the diagnosis      I   Prostate, Right Posterior Medial, Biopsy:  - Prostatic adenocarcinoma, Himanshu Score 4 + 3 = 7, Grade Group III      - Percentage [place] Pattern 4: 60%     - Total number of cores identified: 1     - Total number of cores with carcinoma: 1     - Percentage of tissue with carcinoma: 20%     - Linear amount of tissue with carcinoma: 3 mm total  - High-grade prostatic intraepithelial neoplasia (HGPIN)  - Prostate triple stain (, p63, P504S) supports the diagnosis      J  Prostate, Right Base, Biopsy:  - Benign prostatic tissue      22  Dr Lexi Childs  Reviewed biopsy result  Surgery not an option due to comorbidities,  PSMA Pet scan ordered  If disease is localized on imaging, will need hormone deprivation therapy,  Fiducial markers and [employer] discussed  Referral to radiation oncology given     22  PSMA PET  IMPRESSION:  1   Intensely PSMA avid lesion in the prostate towards the apex and slightly to the right of midline, most compatible with malignancy   This corresponds with the lesion seen on prior MRI  2   No PSMA avid metastases in the pelvis  3   No PSMA avid metastases in the neck, chest, upper abdomen or osseous structures      Patient seen today for consultation with his wife  He reports a history of COPD for many years in addition to sleep apnea  He has been on the CPAP for at least 20 years  He stopped smoking tobacco on 1995 when he had a myocardial infarction  He has urinary urgency all the time along with mild frequency  He has nocturia once and sometimes not at all  He denies any dysuria and also denies any hematuria  He is on home oxygen at 2L secondary to his CPD for about the last 10 years  He is a retired /[place]/  He has 3 children that are all daughters who are healthy  He has 11 grandchildren and 5 great grandchildren  He has no sons  His father  from unknown causes in his 76s  His mother  at the age of 80 he thinks from cardiac disease and she also had Alzheimer's  He has no brothers    He had 1 sister who  of myocardial infarction at the age of 71  Upcoming:  10/13/22 Cardiology Dr Virgen Bustamante  10/17/22 Urology Dr Alma Perry  10/24/22  PCP Dr Michael Ferrer  10/31/22 Pulmonary Dr Harpreet Mckeon   Oncology History   Prostate cancer [facility])   9/13/2022 Initial Diagnosis    Prostate cancer ([address] )     9/13/2022 Biopsy    A  Prostate, Right Anterior Medial (SUZY), Biopsy:  - Prostatic adenocarcinoma, [place] Score 4 + 3 = 7, Grade Group III  - Percentage [place] Pattern 4: 60%     - Total number of cores identified: 2     - Total number of cores with carcinoma: 2     - Percentage of tissue with carcinoma: 50%     - Linear amount of tissue with carcinoma: 15 mm total linear length in all cores     B  Prostate, Right Anterior Lateral, Biopsy:  - Prostatic adenocarcinoma, Himanshu Score 3 + 3 = 6, Grade Group I      - Total number of cores identified: 1     - Total number of cores with carcinoma: 1     - Percentage of tissue with carcinoma: 33%     - Linear amount of tissue with carcinoma: 3 mm total     C  Prostate, Left Anterior Medial, Biopsy:  - Benign prostatic tissue  D  Prostate, Left Anterior Lateral, Biopsy:  - Benign prostatic tissue  E  Prostate, Left Posterior Lateral, Biopsy:  - Prostatic adenocarcinoma, [place] Score 3 + 3 = 6, Grade Group I      - Total number of cores identified: 1     - Total number of cores with carcinoma: 1     - Percentage of tissue with carcinoma: 33%     - Linear amount of tissue with carcinoma: 4 mm total  - Prostate triple stain (, p63, P504S) supports the diagnosis  - High-grade prostatic intraepithelial neoplasia (HGPIN)  F  Prostate, Left Posterior Medial, Biopsy:  - Benign prostatic tissue   - Prostate triple stain (, p63, P504S) supports the diagnosis  G  Prostate, Left Base, Biopsy:  - Benign prostatic tissue   - Prostate triple stain (, p63, P504S) supports the diagnosis       H  Prostate, Right Posterior Lateral, Biopsy:  - Prostatic adenocarcinoma, [place] Score 3 + 3 = 6, Grade Group I      - Total number of cores identified: 1     - Total number of cores with carcinoma: 1     - Percentage of tissue with carcinoma: 10%     - Linear amount of tissue with carcinoma: 1 mm total  - High-grade prostatic intraepithelial neoplasia (HGPIN)  - Prostate triple stain (, p63, P504S) supports the diagnosis  I  Prostate, Right Posterior Medial, Biopsy:  - Prostatic adenocarcinoma, Himanshu Score 4 + 3 = 7, Grade Group III  - Percentage Himanshu Pattern 4: 60%     - Total number of cores identified: 1     - Total number of cores with carcinoma: 1     - Percentage of tissue with carcinoma: 20%     - Linear amount of tissue with carcinoma: 3 mm total  - High-grade prostatic intraepithelial neoplasia (HGPIN)  - Prostate triple stain (, p63, P504S) supports the diagnosis  J  Prostate, Right Base, Biopsy:  - Benign prostatic tissue   - Prostate triple stain (, p63, P504S) supports the diagnosis  10/5/2022 -  Cancer Staged    Staging form: Prostate, AJCC 8th Edition  - Clinical stage from 10/5/2022: Stage IIC (cT1c, cN0, cM0, PSA: 18 2, Grade Group: 3) - Signed by Isabelle Fernandez MD on 10/5/2022  Histopathologic type:  Adenocarcinoma, NOS  Stage prefix: Initial diagnosis  Prostate specific antigen (PSA) range: 10 to 19  Himanshu primary pattern: 4  [place] secondary pattern: 3  [place] score: 7  Histologic grading system: 5 grade system  Location of positive needle core biopsies: Both sides             Past Medical History:   Diagnosis Date    Bilateral carotid artery stenosis     Chronic diastolic heart failure (HCC)     Chronic ischemic heart disease     Colon polyp     COPD (chronic obstructive pulmonary disease) (HCC)     Coronary artery disease     hx stents, MI, PCI    CPAP (continuous positive airway pressure) dependence     Hearing loss     Hyperlipidemia     Hypertension     [facility] [address] [address] [facility]    [facility] [facility]     Pneumonia     Pro[facility] cancer ([facility] )     S/P carotid endarterectomy     Shortness of breath     Sleep apnea     Sleep apnea, obstructive     Stented coronary artery      Past Surgical History:   Procedure Laterality Date    APPENDECTOMY      CARDIAC CATHETERIZATION  03/18/2021    left main with no significant disease, proximal LAD with 10% stenosis at the site of prior stent, left circumflex artery with minimal luminal irregularities mid RCA with 50% stenosis at site of prior stent and PL segment with distal disease supplied by collaterals from the distal circumflex with no significant CAD requiring revascularization at that time      CATARACT EXTRACTION, BILATERAL Bilateral     COLONOSCOPY      CORONARY ANGIOPLASTY WITH STENT PLACEMENT  1999    RCA    CORONARY ANGIOPLASTY WITH STENT PLACEMENT  2001    RCA    CORONARY ANGIOPLASTY WITH STENT PLACEMENT  2003    LAD    EYE SURGERY      MS BIOPSY OF PROSTATE,NEEDLE,TRANSPERINEAL N/A 09/13/2022    Procedure: TRANSPERINEAL MRI FUSION  BIOPSY PROSTATE;  Surgeon: Michaela Mcclain MD;  [facility];  Service: Urology    MS REVISE MEDIAN N/CARPAL TUNNEL SURG Left 07/22/2022    Procedure: RELEASE CARPAL TUNNEL;  Surgeon: Layton Calloway MD;  Location[facility];  Service: Orthopedics    MS REVISE ULNAR NERVE AT ELBOW Left 07/22/2022    Procedure: Debbie Freeman;  Surgeon: Layton Calloway MD;  Location[facility];  Service: Orthopedics    MS REVISE ULNAR NERVE AT WRIST Left 07/22/2022    Procedure: Joseph Felipe;  Surgeon: Layton Calloway MD;  [facility];  Service: Orthopedics    SKIN CANCER EXCISION  2012    chin-per pt, basal cell  also right ankle       Family History   Problem Relation Age of Onset    Heart attack Mother     Dementia Mother        Social History   Social History     Substance and Sexual Activity   Alcohol Use Not Currently     Social History     Substance and Sexual Activity Drug Use Never     Social History     Tobacco Use   Smoking Status Former Smoker    Packs/day: 2 00    Years: 35 00    Pack years: 70 00    Quit date: 1995    Years since quittin 2   Smokeless Tobacco Never Used     Meds/Allergies     Current Outpatient Medications:     albuterol (2 5 mg/3 mL) 0 083 % nebulizer solution, Take 3 mL (2 5 mg total) by nebulization every 6 (six) hours as needed for wheezing or shortness of breath, Disp: 60 mL, Rfl: 1    aspirin 81 mg chewable tablet, CHEW ONE TABLET BY MOUTH EVERY DAY, Disp: , Rfl:     azithromycin (ZITHROMAX) 250 mg tablet, Take 1 every Monday, Wednesday and Friday , Disp: 45 tablet, Rfl: 3    Blood Pressure Monitor KIT, Use daily (Patient taking differently: Use 40 mg daily), Disp: 1 kit, Rfl: 0    famotidine (PEPCID) 20 mg tablet, Take 20 mg by mouth daily at bedtime, Disp: , Rfl:     fluticasone (FLONASE) 50 mcg/act nasal spray, into each nostril as needed , Disp: , Rfl:     fluticasone-salmeterol (Advair) 500-50 mcg/dose inhaler, Inhale 1 puff in the morning Rinse mouth after use , Disp: , Rfl:     furosemide (LASIX) 40 mg tablet, Take 1 tablet (40 mg total) by mouth daily, Disp: 100 tablet, Rfl: 3    gabapentin (NEURONTIN) 300 mg capsule, Take 1 capsule (300 mg total) by mouth 3 (three) times a day, Disp: 90 capsule, Rfl: 1    metoprolol succinate (TOPROL-XL) 25 mg 24 hr tablet, Take 1 tablet (25 mg total) by mouth daily (Patient taking differently: Take 75 mg by mouth daily in the early morning), Disp: 90 tablet, Rfl: 3    metoprolol succinate (TOPROL-XL) 50 mg 24 hr tablet, Take 1 tablet (50 mg total) by mouth 2 (two) times a day (Patient taking differently: Take 50 mg by mouth daily at bedtime), Disp: 180 tablet, Rfl: 3    Multiple Vitamin (multivitamin) tablet, Take 1 tablet by mouth daily, Disp: , Rfl:     nitroglycerin (NITROSTAT) 0 4 mg SL tablet, DISSOLVE ONE TABLET UNDER THE TONGUE  PRN, Disp: , Rfl:     potassium chloride (Klor-Con) 10 mEq tablet, Take 2 tablets (20 mEq total) by mouth daily, Disp: 200 tablet, Rfl: 3    rosuvastatin (CRESTOR) 40 MG tablet, Take 1 tablet (40 mg total) by mouth daily, Disp: 100 tablet, Rfl: 3    Tiotropium Bromide Monohydrate (SPIRIVA RESPIMAT IN), Inhale every morning, Disp: , Rfl:     Ascorbic Acid (vitamin C) 1000 MG tablet, Take 1,000 mg by mouth daily (Patient not taking: No sig reported), Disp: , Rfl:     methocarbamol (ROBAXIN) 500 mg tablet, Take 1 tablet (500 mg total) by mouth 3 (three) times a day as needed for muscle spasms (Patient not taking: No sig reported), Disp: 30 tablet, Rfl: 0  Allergies   Allergen Reactions    Lisinopril Swelling and Cough    Tetanus Antitoxin Anaphylaxis    Tetanus Toxoid Anaphylaxis and Swelling     Review of Systems   Constitutional: Positive for fatigue  HENT: Positive for hearing loss (left ear)  Eyes: Positive for visual disturbance (floaters in both eyes)  Bilateral cataract   Respiratory: Positive for apnea (sleep apnea CPAP machine), cough (productivem for clear sputum  Cough has recently increased), chest tightness, shortness of breath and wheezing  2L of oxygen   Cardiovascular: Positive for chest pain, palpitations and leg swelling (on diuretic)  H/o MI and stents  Carotid artery stenosis   Gastrointestinal: Negative  Endocrine: Positive for heat intolerance  Genitourinary: Positive for frequency and urgency  Negative for dysuria and hematuria  Nocturia 0-1x  Occasional weak stream   Musculoskeletal: Positive for back pain (right sided back and lower back  Controlled with gabapentin)  Skin:        H/o skin cancer  Has lesions that have returned after cryo  To face   Allergic/Immunologic: Negative  Neurological: Positive for tremors (infrequently in both hands)  Hematological: Positive for adenopathy  Bruises/bleeds easily     Psychiatric/Behavioral: Positive for sleep disturbance       Clinical Trial: no     IPSS Questionnaire (AUA-7): Over the past month     1)  How often have you had a sensation of not emptying your bladder completely after you finish urinating? 1 - Less than 1 time in 5   2)  How often have you had to urinate again less than two hours after you finished urinating? 1 - Less than 1 time in 5   3)  How often have you found you stopped and started again several times when you urinated? 1 - Less than 1 time in 5   4) How difficult have you found it to postpone urination? 5 - Almost always   5) How often have you had a weak urinary stream?  2 - Less than half the time   6) How often have you had to push or strain to begin urination? 2 - Less than half the time   7) How many times did you most typically get up to urinate from the time you went to bed until the time you got up in the morning? 1 - 1 time   Total Score:  13     OBJECTIVE:   Ht 5' 8" (1 727 m)   Wt 99 1 kg (218 lb 7 6 oz)   SpO2 90% Comment: on 2L oxygen via nasal cannula  BMI 33 22 kg/m²   Pain Assessment:  1  Performance Status: ECOG/[place]ub[place]/WHO: 1 - Symptomatic but completely ambulatory    Physical Exam  Vitals and nursing note reviewed  Constitutional:       General: He is not in acute distress  Appearance: He is well-developed  He is not ill-appearing or diaphoretic  HENT:      Head: Normocephalic and atraumatic  Mouth/Throat:      Pharynx: No oropharyngeal exudate  Eyes:      General: No scleral icterus  Conjunctiva/sclera: Conjunctivae normal       Pupils: Pupils are equal, round, and reactive to light  Neck:      Thyroid: No thyromegaly  Trachea: No tracheal deviation  Cardiovascular:      Rate and Rhythm: Normal rate and regular rhythm  Heart sounds: Normal heart sounds  Pulmonary:      Effort: Pulmonary effort is normal  No respiratory distress  Breath sounds: Normal breath sounds  No stridor  No wheezing, rhonchi or rales  Chest:      Chest wall: No tenderness  Breasts:      Right: No supraclavicular adenopathy  Left: No supraclavicular adenopathy  Abdominal:      General: Bowel sounds are normal  There is no distension  Palpations: Abdomen is soft  There is no mass  Tenderness: There is no abdominal tenderness  Genitourinary:     Comments: Rectal exam differed  Musculoskeletal:         General: No swelling or tenderness  Normal range of motion  Cervical back: Normal range of motion and neck supple  Lymphadenopathy:      Cervical: No cervical adenopathy  Upper Body:      Right upper body: No supraclavicular adenopathy  Left upper body: No supraclavicular adenopathy  Lower Body: No right inguinal adenopathy  No left inguinal adenopathy  Skin:     General: Skin is warm and dry  Coloration: Skin is not jaundiced or pale  Findings: No erythema or rash  Neurological:      General: No focal deficit present  Mental Status: He is alert and oriented to person, place, and time  Cranial Nerves: No cranial nerve deficit  Coordination: Coordination normal    Psychiatric:         Mood and Affect: Mood normal          Behavior: Behavior normal          Thought Content: Thought content normal          Judgment: Judgment normal        RESULTS  Lab Results  Lab Results   Component Value Date    PSA 12 8 (H) 06/02/2022    PSA 18 2 (H) 04/27/2022    PSA 9 1 (H) 04/18/2022     Imaging Studies  XR chest 1 view portable    Result Date: 9/10/2022  Narrative: CHEST INDICATION:   sob  COMPARISON:  9/8/2022 EXAM PERFORMED/VIEWS:  XR CHEST PORTABLE FINDINGS: Cardiomediastinal silhouette appears unremarkable  Shallow inspiration with mild bibasilar hypoventilatory changes  No infiltrate or consolidation  No pneumothorax or pleural effusion  Osseous structures appear within normal limits for patient age  Impression: No acute cardiopulmonary disease   Workstation performed: [id number]     XR chest pa & lateral    Result Date: 9/10/2022  Narrative: CHEST INDICATION:   R05 9: Cough, unspecified  COMPARISON:  CXR 8/24/2022, abdomen CT 03/09/2022, chest CT 2/28/2022  EXAM PERFORMED/VIEWS:  XR CHEST PA & LATERAL FINDINGS: Cardiomediastinal silhouette appears unremarkable  Clips in the lower right neck  Severe emphysema with no acute disease  No effusion or pneumothorax  Osseous structures appear within normal limits for patient age  Impression: No acute cardiopulmonary disease  Workstation performed: [id number]     NM PET CT skull base to mid thigh    Result Date: 9/30/2022  Narrative: PYLARIFY PSMA PET/CT SCAN INDICATION:  C61: Malignant neoplasm of prostate   , initial staging MODIFIER: PI COMPARISON: MRI prostate 7/11/2022 and priors CELL TYPE:  Prostatic adenocarcinoma, prostate biopsy 9/13/2022 TECHNIQUE:   9 6 mCi F-18 Pylarify PSMA administered IV  Multiplanar attenuation corrected and non attenuation corrected PET images were acquired 60 minutes post injection  Contiguous, low dose, axial CT sections were obtained from the vertex through the  femurs   Intravenous or oral contrast was not utilized  This examination, like all CT scans performed in the [facility], was performed utilizing techniques to minimize radiation dose exposure, including the use of iterative reconstruction and automated exposure control  FINDINGS: VISUALIZED BRAIN:   No acute abnormalities are seen  HEAD/NECK:   There is a physiologic distribution of the radiotracer  No PSMA avid cervical adenopathy is seen  CT images: Unremarkable  CHEST:   No PSMA avid soft tissue lesions are seen  CT images: Gynecomastia  Coronary atherosclerosis  Emphysema  Right middle, left lingular scarring  ABDOMEN:   No PSMA avid soft tissue lesions are seen  CT images: Colon diverticula  PELVIS: There is a PSMA avid lesion in the prostate towards the apex, and slightly to the right of midline, SUV measuring 10 5, most compatible with malignancy    This corresponds with the lesion seen on prior MRI  This measures approximately 1 x 1 5 x 2 cm based on the PET images  See series 1200 image 260 and series 1201 image 57  No additional PSMA avid soft tissue lesions are seen in the pelvis  No PSMA avid adenopathy  CT images: Stable  OSSEOUS STRUCTURES: No PSMA avid lesions are seen  CT images: Spine degenerative change  Impression: 1  Intensely PSMA avid lesion in the prostate towards the apex and slightly to the right of midline, most compatible with malignancy  This corresponds with the lesion seen on prior MRI  2   No PSMA avid metastases in the pelvis  3   No PSMA avid metastases in the neck, chest, upper abdomen or osseous structures  Workstation performed: [id number]     Echo follow up/limited w/ contrast if indicated    Result Date: 9/29/2022  Narrative: Sean Morel  Left Ventricle: Left ventricular cavity size is normal  Wall thickness is normal  The left ventricular ejection fraction is 45%  Systolic function is mildly reduced  Global longitudinal strain is borderline at -16%  There is mild global hypokinesis with regional variation  Diastolic function is mildly abnormal, consistent with grade I (abnormal) relaxation    Aortic Valve: There is mild regurgitation  There is mild stenosis  There is aortic sclerosis  The aortic valve peak velocity is 2 0 m/s    Mitral Valve: There is mild to moderate regurgitation with a posteriorly directed jet    Tricuspid Valve: The right ventricular systolic pressure is mildly elevated  The estimated right ventricular systolic pressure is 02 41 mmHg    Prior TTE study available for comparison  Prior study date: 1/14/2022  No significant changes noted compared to the prior study  Pathology: See Above    ASSESSMENT  1   Prostate cancer [facility])  Ambulatory Referral to Radiation Oncology     Cancer Staging  Prostate cancer [facility])  Staging form: Prostate, AJCC 8th Edition  - Clinical stage from 10/5/2022: Stage IIC (cT1c, cN0, cM0, PSA: 18 2, Grade Group: 3) - Signed by Magen Murrell MD on 10/5/2022  Histopathologic type: Adenocarcinoma, NOS  Stage prefix: Initial diagnosis  Prostate specific antigen (PSA) range: 10 to 19  [place] primary pattern: 4  [place] secondary pattern: 3  Himanshu score: 7  Histologic grading system: 5 grade system  Location of positive needle core biopsies: Both sides      Risk Category: moderate  Bone Scan: yes - PSMA PET-CT  Androgen Deprivation Therapy: yes - Due to start ADT soon  PLAN/DISCUSSION  No orders of the defined types were placed in this encounter  Laverne Hughes is a 65y o  year old male with a stage IIC [place] score 4+3=7 prostate adenocarcinoma diagnosed after an elevated PSA of 18 2 NG/mL  He had MRI prostate which was category 4 with high probability of significant cancer being present  There was no extraprostatic tumor as well as no seminal vesicle invasion, pelvic lymphadenopathy, or pelvis osseous metastasis  He also had a PSMA PET-CT study September 30, 2022 that revealed intense activity within the prostate corresponding to the area seen on MRI consistent with malignancy  There was no metastatic disease seen within the pelvis as well as no other sites throughout the body  He is seen for consultation to consider definitive external beam radiation therapy  Given his age and medical problems, he is not a candidate for surgery  We would recommend definitive external beam radiation therapy using intensity modulated radiation therapy as well as image guided radiation therapy  We discussed placement of fiducial markers for image guided radiation therapy that will improve the accuracy of treatment and reduce side effects  We also discussed placement of SpaceOAR to reduce dose to the rectum  He has [place] score 4+3=7 disease and we would recommend androgen deprivation therapy along with radiation therapy    We discussed a conventional course of treatment over 9 weeks as well as a hypofractionated course of treatment over 6 weeks  We recommend a hypofractionated course of treatment over 6 weeks  We discussed acute side effects and the potential chronic complications of radiation therapy with him and he does consent to proceed with the treatment  He will be scheduled for androgen deprivation therapy as well as fiducial marker placement and SpaceOAR placement  He will then return for simulation and treatment planning purposes in the [facility] which is also where he would be treated  Parminder Dumas  10/5/2022,1:48 PM      Portions of the record may have been created with voice recognition software   Occasional wrong word or "sound a like" substitutions may have occurred due to the inherent limitations of voice recognition software   Read the chart carefully and recognize, using context, where substitutions have occurred

## 2022-10-05 NOTE — PROGRESS NOTES
Anamaria Petty 1945 is a 65 y o  male Here to discuss RT for his recently diagnosed Himanshu 7(4+3) prostate cancer  Referred by Dr Jacy Diego  Additional medical problems include COPD, chronic CHF, ischemic heart disease, HTN, occlusion and stenosis of carotid artery  Lab Results   Component Value Date    PSA 12 8 (H) 06/02/2022    PSA 18 2 (H) 04/27/2022    PSA 9 1 (H) 04/18/2022 5/6/22  Dr Jacy Diego  Seen in consult for elevated PSA  Reports history of urinary problems including nocturia and occasional dribbling for last 6 months  Rectal exam normal  MRI recommended    7/11/22  MRI prostate   IMPRESSION:     1  PI-RADSv2 1 Category 4 -High (clinically significant cancer is likely to be present)  Lesion in the anterior right peripheral zone at apex     2  No extraprostatic tumor, seminal vesicle invasion, pelvic lymphadenopathy, or pelvic osseous metastatic disease  3  Calculated prostate volume of 55 cc     9/13/22  Tissue Exam  A  Prostate, Right Anterior Medial (SUZY), Biopsy:  - Prostatic adenocarcinoma, Himanshu Score 4 + 3 = 7, Grade Group III  - Percentage [place] Pattern 4: 60%     - Total number of cores identified: 2     - Total number of cores with carcinoma: 2     - Percentage of tissue with carcinoma: 50%     - Linear amount of tissue with carcinoma: 15 mm total linear length in all cores     B  Prostate, Right Anterior Lateral, Biopsy:  - Prostatic adenocarcinoma, [place] Score 3 + 3 = 6, Grade Group I      - Total number of cores identified: 1     - Total number of cores with carcinoma: 1     - Percentage of tissue with carcinoma: 33%     - Linear amount of tissue with carcinoma: 3 mm total     C  Prostate, Left Anterior Medial, Biopsy:  - Benign prostatic tissue  D  Prostate, Left Anterior Lateral, Biopsy:  - Benign prostatic tissue       E  Prostate, Left Posterior Lateral, Biopsy:  - Prostatic adenocarcinoma, Himanshu Score 3 + 3 = 6, Grade Group I      - Total number of cores identified: 1     - Total number of cores with carcinoma: 1     - Percentage of tissue with carcinoma: 33%     - Linear amount of tissue with carcinoma: 4 mm total  - Prostate triple stain (, p63, P504S) supports the diagnosis  - High-grade prostatic intraepithelial neoplasia (HGPIN)  F  Prostate, Left Posterior Medial, Biopsy:  - Benign prostatic tissue   - Prostate triple stain (, p63, P504S) supports the diagnosis  G  Prostate, Left Base, Biopsy:  - Benign prostatic tissue   - Prostate triple stain (, p63, P504S) supports the diagnosis  H  Prostate, Right Posterior Lateral, Biopsy:  - Prostatic adenocarcinoma, [place] Score 3 + 3 = 6, Grade Group I      - Total number of cores identified: 1     - Total number of cores with carcinoma: 1     - Percentage of tissue with carcinoma: 10%     - Linear amount of tissue with carcinoma: 1 mm total  - High-grade prostatic intraepithelial neoplasia (HGPIN)  - Prostate triple stain (, p63, P504S) supports the diagnosis  I  Prostate, Right Posterior Medial, Biopsy:  - Prostatic adenocarcinoma, Himanshu Score 4 + 3 = 7, Grade Group III  - Percentage [place] Pattern 4: 60%     - Total number of cores identified: 1     - Total number of cores with carcinoma: 1     - Percentage of tissue with carcinoma: 20%     - Linear amount of tissue with carcinoma: 3 mm total  - High-grade prostatic intraepithelial neoplasia (HGPIN)  - Prostate triple stain (, p63, P504S) supports the diagnosis  J  Prostate, Right Base, Biopsy:  - Benign prostatic tissue  9/20/22  Dr Rommel Hughes  Reviewed biopsy result  Surgery not an option due to comorbidities,  PSMA Pet scan ordered  If disease is localized on imaging, will need hormone deprivation therapy,  Fiducial markers and SpaceOar discussed  Referral to radiation oncology given    9/30/22  PSMA PET  IMPRESSION:  1    Intensely PSMA avid lesion in the prostate towards the apex and slightly to the right of midline, most compatible with malignancy  This corresponds with the lesion seen on prior MRI  2   No PSMA avid metastases in the pelvis  3   No PSMA avid metastases in the neck, chest, upper abdomen or osseous structures  Upcoming:  10/17/22  Urology                Oncology History Overview Note   Here to discuss RT for his recently diagnosed Himanshu 7(4+3) prostate cancer  Referred by Dr Silas Crabtree  Additional medical problems include COPD, chronic CHF, ischemic heart disease, HTN, occlusion and stenosis of carotid artery  5/6/22  Dr Silas Crabtree  Seen in consult for elevated PSA  Reports history of urinary problems including nocturia and occasional dribbling for last 6 months  Rectal exam normal  MRI recommended    7/11/22  MRI prostate   IMPRESSION:     1  PI-RADSv2 1 Category 4 -High (clinically significant cancer is likely to be present)  Lesion in the anterior right peripheral zone at apex     2  No extraprostatic tumor, seminal vesicle invasion, pelvic lymphadenopathy, or pelvic osseous metastatic disease  3  Calculated prostate volume of 55 cc     9/13/22  Tissue Exam  A  Prostate, Right Anterior Medial (SUZY), Biopsy:  - Prostatic adenocarcinoma, Himanshu Score 4 + 3 = 7, Grade Group III  - Percentage Himanshu Pattern 4: 60%     - Total number of cores identified: 2     - Total number of cores with carcinoma: 2     - Percentage of tissue with carcinoma: 50%     - Linear amount of tissue with carcinoma: 15 mm total linear length in all cores     B  Prostate, Right Anterior Lateral, Biopsy:  - Prostatic adenocarcinoma, Himanshu Score 3 + 3 = 6, Grade Group I      - Total number of cores identified: 1     - Total number of cores with carcinoma: 1     - Percentage of tissue with carcinoma: 33%     - Linear amount of tissue with carcinoma: 3 mm total     C  Prostate, Left Anterior Medial, Biopsy:  - Benign prostatic tissue       D  Prostate, Left Anterior Lateral, Biopsy:  - Benign prostatic tissue  E  Prostate, Left Posterior Lateral, Biopsy:  - Prostatic adenocarcinoma, Himanshu Score 3 + 3 = 6, Grade Group I      - Total number of cores identified: 1     - Total number of cores with carcinoma: 1     - Percentage of tissue with carcinoma: 33%     - Linear amount of tissue with carcinoma: 4 mm total  - Prostate triple stain (, p63, P504S) supports the diagnosis  - High-grade prostatic intraepithelial neoplasia (HGPIN)  F  Prostate, Left Posterior Medial, Biopsy:  - Benign prostatic tissue   - Prostate triple stain (, p63, P504S) supports the diagnosis  G  Prostate, Left Base, Biopsy:  - Benign prostatic tissue   - Prostate triple stain (, p63, P504S) supports the diagnosis  H  Prostate, Right Posterior Lateral, Biopsy:  - Prostatic adenocarcinoma, [place] Score 3 + 3 = 6, Grade Group I      - Total number of cores identified: 1     - Total number of cores with carcinoma: 1     - Percentage of tissue with carcinoma: 10%     - Linear amount of tissue with carcinoma: 1 mm total  - High-grade prostatic intraepithelial neoplasia (HGPIN)  - Prostate triple stain (, p63, P504S) supports the diagnosis  I  Prostate, Right Posterior Medial, Biopsy:  - Prostatic adenocarcinoma, [place] Score 4 + 3 = 7, Grade Group III  - Percentage [place] Pattern 4: 60%     - Total number of cores identified: 1     - Total number of cores with carcinoma: 1     - Percentage of tissue with carcinoma: 20%     - Linear amount of tissue with carcinoma: 3 mm total  - High-grade prostatic intraepithelial neoplasia (HGPIN)  - Prostate triple stain (, p63, P504S) supports the diagnosis  J  Prostate, Right Base, Biopsy:  - Benign prostatic tissue  9/20/22  Dr Samuel Hearing  Reviewed biopsy result  Surgery not an option due to comorbidities,  PSMA Pet scan ordered    If disease is localized on imaging, will need hormone deprivation therapy,  Fiducial markers and SpaceOar discussed  Referral to radiation oncology given    9/30/22  PSMA PET  IMPRESSION:  1  Intensely PSMA avid lesion in the prostate towards the apex and slightly to the right of midline, most compatible with malignancy  This corresponds with the lesion seen on prior MRI  2   No PSMA avid metastases in the pelvis  3   No PSMA avid metastases in the neck, chest, upper abdomen or osseous structures  Upcoming:  10/17/22  Urology               Prostate cancer [facility])   9/13/2022 Initial Diagnosis    Prostate cancer ([facility] [address] )     9/13/2022 Biopsy    A  Prostate, Right Anterior Medial (SUZY), Biopsy:  - Prostatic adenocarcinoma, Himanshu Score 4 + 3 = 7, Grade Group III  - Percentage Himanshu Pattern 4: 60%     - Total number of cores identified: 2     - Total number of cores with carcinoma: 2     - Percentage of tissue with carcinoma: 50%     - Linear amount of tissue with carcinoma: 15 mm total linear length in all cores     B  Prostate, Right Anterior Lateral, Biopsy:  - Prostatic adenocarcinoma, Himanshu Score 3 + 3 = 6, Grade Group I      - Total number of cores identified: 1     - Total number of cores with carcinoma: 1     - Percentage of tissue with carcinoma: 33%     - Linear amount of tissue with carcinoma: 3 mm total     C  Prostate, Left Anterior Medial, Biopsy:  - Benign prostatic tissue  D  Prostate, Left Anterior Lateral, Biopsy:  - Benign prostatic tissue  E  Prostate, Left Posterior Lateral, Biopsy:  - Prostatic adenocarcinoma, [place] Score 3 + 3 = 6, Grade Group I      - Total number of cores identified: 1     - Total number of cores with carcinoma: 1     - Percentage of tissue with carcinoma: 33%     - Linear amount of tissue with carcinoma: 4 mm total  - Prostate triple stain (, p63, P504S) supports the diagnosis  - High-grade prostatic intraepithelial neoplasia (HGPIN)       F  Prostate, Left Posterior Medial, Biopsy:  - Benign prostatic tissue   - Prostate triple stain (, p63, P504S) supports the diagnosis  G  Prostate, Left Base, Biopsy:  - Benign prostatic tissue   - Prostate triple stain (, p63, P504S) supports the diagnosis  H  Prostate, Right Posterior Lateral, Biopsy:  - Prostatic adenocarcinoma, Himanshu Score 3 + 3 = 6, Grade Group I      - Total number of cores identified: 1     - Total number of cores with carcinoma: 1     - Percentage of tissue with carcinoma: 10%     - Linear amount of tissue with carcinoma: 1 mm total  - High-grade prostatic intraepithelial neoplasia (HGPIN)  - Prostate triple stain (, p63, P504S) supports the diagnosis  I  Prostate, Right Posterior Medial, Biopsy:  - Prostatic adenocarcinoma, [place] Score 4 + 3 = 7, Grade Group III  - Percentage Himanshu Pattern 4: 60%     - Total number of cores identified: 1     - Total number of cores with carcinoma: 1     - Percentage of tissue with carcinoma: 20%     - Linear amount of tissue with carcinoma: 3 mm total  - High-grade prostatic intraepithelial neoplasia (HGPIN)  - Prostate triple stain (, p63, P504S) supports the diagnosis  J  Prostate, Right Base, Biopsy:  - Benign prostatic tissue   - Prostate triple stain (, p63, P504S) supports the diagnosis  Review of Systems:  Review of Systems   Constitutional: Positive for fatigue  HENT: Positive for hearing loss (left ear)  Eyes: Positive for visual disturbance (floaters in both eyes)  Bilateral cataract   Respiratory: Positive for apnea (sleep apnea CPAP machine), cough (productivem for clear sputum  Cough has recently increased), chest tightness, shortness of breath and wheezing  2L of oxygen   Cardiovascular: Positive for chest pain, palpitations and leg swelling (on diuretic)  H/o MI and stents  Carotid artery stenosis   Gastrointestinal: Negative  Endocrine: Positive for heat intolerance  Genitourinary: Positive for frequency and urgency   Negative for dysuria and hematuria  Nocturia 0-1x  Occasional weak stream   Musculoskeletal: Positive for back pain (right sided back and lower back  Controlled with gabapentin)  Skin:        H/o skin cancer  Has lesions that have returned after cryo  To face   Allergic/Immunologic: Negative  Neurological: Positive for tremors (infrequently in both hands)  Hematological: Positive for adenopathy  Bruises/bleeds easily  Psychiatric/Behavioral: Positive for sleep disturbance  Clinical Trial: no    IPSS Questionnaire (AUA-7): Over the past month    1)  How often have you had a sensation of not emptying your bladder completely after you finish urinating? 1 - Less than 1 time in 5   2)  How often have you had to urinate again less than two hours after you finished urinating? 1 - Less than 1 time in 5   3)  How often have you found you stopped and started again several times when you urinated? 1 - Less than 1 time in 5   4) How difficult have you found it to postpone urination? 5 - Almost always   5) How often have you had a weak urinary stream?  2 - Less than half the time   6) How often have you had to push or strain to begin urination? 2 - Less than half the time   7) How many times did you most typically get up to urinate from the time you went to bed until the time you got up in the morning? 1 - 1 time   Total Score:  13       Pain assessment: 1      Prior Radiation: NONE    Teaching  [facility] radiation oncology booklet given    Oncolink sheets on  Fatigue, diarrhea,  Space oar given and reviewed     MST Completed    Implantable Devices (Port, pacemaker, pain stimulator): none    Hip Replacement: none    Covid Vaccine Status Vaccinated x4    Health Maintenance   Topic Date Due    Pneumococcal Vaccine: 65+ Years (2 - PPSV23 or PCV20) 11/30/2019    COVID-19 Vaccine (3 - Booster for Moderna series) 07/16/2021    OT PLAN OF CARE  08/25/2022    Influenza Vaccine (1) 09/01/2022    Medicare Annual Wellness Visit (AWV)  04/14/2023    BMI: Followup Plan  04/15/2023    Fall Risk  07/26/2023    BMI: Adult  09/29/2023    Hepatitis C Screening  Completed    HIB Vaccine  Aged Out    Hepatitis B Vaccine  Aged Out    IPV Vaccine  Aged Out    Hepatitis A Vaccine  Aged Out    Meningococcal ACWY Vaccine  Aged Out    HPV Vaccine  Aged Out       Past Medical History:   Diagnosis Date    Bilateral carotid artery stenosis     Chronic diastolic heart failure (HCC)     Chronic ischemic heart disease     Colon polyp     [facility] pulmonary [facility] )     Coronary artery disease     hx stents, MI, PCI    CPAP (continuous positive airway pressure) dependence     Hearing loss     Hyperlipidemia     Hypertension     MI (myocardial infarction) ([facility] ) 1995    Myocardial infarction ([place] [address] )     Pneumonia     S/P carotid endarterectomy     Shortness of breath     Sleep apnea     Sleep apnea, obstructive     Stented coronary artery        Past Surgical History:   Procedure Laterality Date    APPENDECTOMY      CARDIAC CATHETERIZATION  03/18/2021    left main with no significant disease, proximal LAD with 10% stenosis at the site of prior stent, left circumflex artery with minimal luminal irregularities mid RCA with 50% stenosis at site of prior stent and PL segment with distal disease supplied by collaterals from the distal circumflex with no significant CAD requiring revascularization at that time      COLONOSCOPY      CORONARY ANGIOPLASTY WITH STENT PLACEMENT  1999    RCA    CORONARY ANGIOPLASTY WITH STENT PLACEMENT  2001    RCA    CORONARY ANGIOPLASTY WITH STENT PLACEMENT  2003    LAD    EYE SURGERY      TX BIOPSY OF PROSTATE,NEEDLE,TRANSPERINEAL N/A 9/13/2022    Procedure: TRANSPERINEAL MRI FUSION  BIOPSY PROSTATE;  Surgeon: Cecille Du MD;  Location: [facility];  Service: Urology    TX REVISE MEDIAN N/CARPAL TUNNEL SURG Left 7/22/2022    Procedure: RELEASE CARPAL TUNNEL;  Surgeon: Lindy Snyder Gavi Alfaro MD;  Location: [facility];  Service: Orthopedics    AZ REVISE ULNAR NERVE AT ELBOW Left 2022    Procedure: Brenda Castaneda;  Surgeon: Lottie Hdez MD;  Location: [facility];  Service: Orthopedics    AZ REVISE ULNAR NERVE AT WRIST Left 2022    Procedure: Nirali Morales;  Surgeon: Lottie Hdez MD;  [facility];  Service: Orthopedics    SKIN CANCER EXCISION      chin-per pt, basal cell       Family History   Problem Relation Age of Onset    Heart attack Mother     Dementia Mother        Social History     Tobacco Use    Smoking status: Former Smoker     Quit date:      Years since quittin 7    Smokeless tobacco: Never Used   Vaping Use    Vaping Use: Never used   Substance Use Topics    Alcohol use: Not Currently    Drug use: Never          Current Outpatient Medications:     albuterol (2 5 mg/3 mL) 0 083 % nebulizer solution, Take 3 mL (2 5 mg total) by nebulization every 6 (six) hours as needed for wheezing or shortness of breath, Disp: 60 mL, Rfl: 1    Ascorbic Acid (vitamin C) 1000 MG tablet, Take 1,000 mg by mouth daily (Patient not taking: Reported on 2022), Disp: , Rfl:     aspirin 81 mg chewable tablet, CHEW ONE TABLET BY MOUTH EVERY DAY, Disp: , Rfl:     azithromycin (ZITHROMAX) 250 mg tablet, Take 1 every Monday, Wednesday and Friday   (Patient not taking: Reported on 2022), Disp: 45 tablet, Rfl: 3    Blood Pressure Monitor KIT, Use daily (Patient taking differently: Use 40 mg daily), Disp: 1 kit, Rfl: 0    famotidine (PEPCID) 20 mg tablet, Take 40 mg by mouth daily at bedtime, Disp: , Rfl:     fluticasone (FLONASE) 50 mcg/act nasal spray, into each nostril as needed , Disp: , Rfl:     fluticasone-salmeterol (Advair) 500-50 mcg/dose inhaler, Inhale 1 puff in the morning Rinse mouth after use , Disp: , Rfl:     furosemide (LASIX) 40 mg tablet, Take 1 tablet (40 mg total) by mouth daily, Disp: 100 tablet, Rfl: 3   gabapentin (NEURONTIN) 300 mg capsule, Take 1 capsule (300 mg total) by mouth 3 (three) times a day, Disp: 90 capsule, Rfl: 1    methocarbamol (ROBAXIN) 500 mg tablet, Take 1 tablet (500 mg total) by mouth 3 (three) times a day as needed for muscle spasms (Patient not taking: Reported on 9/20/2022), Disp: 30 tablet, Rfl: 0    metoprolol succinate (TOPROL-XL) 25 mg 24 hr tablet, Take 1 tablet (25 mg total) by mouth daily (Patient taking differently: Take 75 mg by mouth daily in the early morning), Disp: 90 tablet, Rfl: 3    metoprolol succinate (TOPROL-XL) 50 mg 24 hr tablet, Take 1 tablet (50 mg total) by mouth 2 (two) times a day (Patient taking differently: Take 50 mg by mouth daily at bedtime), Disp: 180 tablet, Rfl: 3    Multiple Vitamin (multivitamin) tablet, Take 1 tablet by mouth daily, Disp: , Rfl:     nitroglycerin (NITROSTAT) 0 4 mg SL tablet, DISSOLVE ONE TABLET UNDER THE TONGUE  PRN, Disp: , Rfl:     potassium chloride (Klor-Con) 10 mEq tablet, Take 2 tablets (20 mEq total) by mouth daily, Disp: 200 tablet, Rfl: 3    rosuvastatin (CRESTOR) 40 MG tablet, Take 1 tablet (40 mg total) by mouth daily, Disp: 100 tablet, Rfl: 3    Tiotropium Bromide Monohydrate (SPIRIVA RESPIMAT IN), Inhale every morning, Disp: , Rfl:     Allergies   Allergen Reactions    Lisinopril Swelling and Cough    Tetanus Antitoxin Anaphylaxis    Tetanus Toxoid Anaphylaxis and Swelling        There were no vitals filed for this visit

## 2022-10-05 NOTE — TELEPHONE ENCOUNTER
Patient has been seen for RT consult by Dr Eva Tierney  He is requesting hormones, fiducial markers and SpaceOar  His appointment with Dr Johann Ni for 10/17has been cancelled  He currently has no upcoming appointments with urology

## 2022-10-06 ENCOUNTER — TELEPHONE (OUTPATIENT)
Dept: UROLOGY | Facility: AMBULATORY SURGERY CENTER | Age: 77
End: 2022-10-06

## 2022-10-06 NOTE — TELEPHONE ENCOUNTER
Called the patient to discuss ADT therapy  Informed patient ADT needs to be authorized before scheduling an appointment  I told patient I will call him once authorization is obtained to schedule an appointment

## 2022-10-06 NOTE — TELEPHONE ENCOUNTER
Trevin under care of Dr Dinora Roman    Last Seen: 9/20/22    Reason for call: Patient needs to come in for an injection before he can start radiation and doesn't want to wait until November   Could we put him back on the schedule for 10/17 with Dr Dinora Roamn at [facility]    Patient can be reached at: [phone]

## 2022-10-07 ENCOUNTER — PATIENT OUTREACH (OUTPATIENT)
Dept: HEMATOLOGY ONCOLOGY | Facility: CLINIC | Age: 77
End: 2022-10-07

## 2022-10-07 DIAGNOSIS — C61 PROSTATE CANCER (HCC): Primary | ICD-10-CM

## 2022-10-07 NOTE — PROGRESS NOTES
Spoke with patient and his wife and introduced myself and explained my role  We went over General and  assessments  He has my direct line if questions or concerns come up   referral and genetics counselor referrals were put into Epic  Patient is aware of referrals placed

## 2022-10-11 ENCOUNTER — TELEPHONE (OUTPATIENT)
Dept: UROLOGY | Facility: CLINIC | Age: 77
End: 2022-10-11

## 2022-10-11 ENCOUNTER — TELEPHONE (OUTPATIENT)
Dept: GENETICS | Facility: CLINIC | Age: 77
End: 2022-10-11

## 2022-10-11 PROBLEM — R05.9 COUGH: Status: RESOLVED | Noted: 2022-04-27 | Resolved: 2022-10-11

## 2022-10-11 NOTE — TELEPHONE ENCOUNTER
I called Mac Phalen to schedule a new patient appointment with the Cancer Risk and Genetics Program       Outcome:   Genetics appointment scheduled for Thursday October 13 at 11:00 am  Patient recently diagnosis with prostate cancer  Family history of prostate cancer (cousin)  Patient is not [employer], no prior genetic testing  Patient's e-mail address confirmed

## 2022-10-11 NOTE — TELEPHONE ENCOUNTER
----- Message from Sneha Blum MD sent at 10/10/2022  9:21 AM EDT -----  Hi team,    Can we please precert single 6mos depot Lupron and plan RN visit to admin? Can then schedule SpaceOAR/fiducials      ALISA Davis      ----- Message -----  From: Linh Rowe MD  Sent: 10/8/2022   9:58 PM EDT  To: Melonie Mart MA, Sneha Blum MD, #

## 2022-10-12 ENCOUNTER — TELEPHONE (OUTPATIENT)
Dept: PAIN MEDICINE | Facility: CLINIC | Age: 77
End: 2022-10-12

## 2022-10-12 DIAGNOSIS — G58.8 INTERCOSTAL NEURALGIA: ICD-10-CM

## 2022-10-12 DIAGNOSIS — G89.29 CHRONIC RIGHT-SIDED THORACIC BACK PAIN: ICD-10-CM

## 2022-10-12 DIAGNOSIS — M54.6 CHRONIC RIGHT-SIDED THORACIC BACK PAIN: ICD-10-CM

## 2022-10-12 DIAGNOSIS — G89.4 CHRONIC PAIN SYNDROME: ICD-10-CM

## 2022-10-12 NOTE — TELEPHONE ENCOUNTER
Caller: Patient     Doctor: Levon Damon, [address] [place] [address]    Reason for call: pt states Gabapentin 300 mg / 90 day supply  was sent to the wrong pharmacy & [facility]  Mail delivery on file, [facility] [phone]       Call back#: [phone]

## 2022-10-13 ENCOUNTER — TELEPHONE (OUTPATIENT)
Dept: PAIN MEDICINE | Facility: CLINIC | Age: 77
End: 2022-10-13

## 2022-10-13 ENCOUNTER — PATIENT OUTREACH (OUTPATIENT)
Dept: CASE MANAGEMENT | Facility: OTHER | Age: 77
End: 2022-10-13

## 2022-10-13 ENCOUNTER — CLINICAL SUPPORT (OUTPATIENT)
Dept: GENETICS | Facility: CLINIC | Age: 77
End: 2022-10-13

## 2022-10-13 ENCOUNTER — OFFICE VISIT (OUTPATIENT)
Dept: CARDIOLOGY CLINIC | Facility: CLINIC | Age: 77
End: 2022-10-13
Payer: COMMERCIAL

## 2022-10-13 VITALS
DIASTOLIC BLOOD PRESSURE: 70 MMHG | WEIGHT: 217 LBS | SYSTOLIC BLOOD PRESSURE: 124 MMHG | BODY MASS INDEX: 32.89 KG/M2 | HEIGHT: 68 IN | HEART RATE: 70 BPM

## 2022-10-13 DIAGNOSIS — Z80.3 FAMILY HISTORY OF BREAST CANCER: ICD-10-CM

## 2022-10-13 DIAGNOSIS — I25.9 CHRONIC ISCHEMIC HEART DISEASE: Primary | ICD-10-CM

## 2022-10-13 DIAGNOSIS — I10 PRIMARY HYPERTENSION: ICD-10-CM

## 2022-10-13 DIAGNOSIS — C61 PROSTATE CANCER (HCC): Primary | ICD-10-CM

## 2022-10-13 DIAGNOSIS — E78.2 MIXED HYPERLIPIDEMIA: ICD-10-CM

## 2022-10-13 DIAGNOSIS — E66.09 CLASS 1 OBESITY DUE TO EXCESS CALORIES WITH BODY MASS INDEX (BMI) OF 32.0 TO 32.9 IN ADULT, UNSPECIFIED WHETHER SERIOUS COMORBIDITY PRESENT: ICD-10-CM

## 2022-10-13 DIAGNOSIS — Z80.42 FAMILY HISTORY OF PROSTATE CANCER: ICD-10-CM

## 2022-10-13 PROCEDURE — NC001 PR NO CHARGE: Performed by: GENETIC COUNSELOR, MS

## 2022-10-13 PROCEDURE — 99214 OFFICE O/P EST MOD 30 MIN: CPT | Performed by: INTERNAL MEDICINE

## 2022-10-13 RX ORDER — GABAPENTIN 300 MG/1
300 CAPSULE ORAL 3 TIMES DAILY
Qty: 90 CAPSULE | Refills: 1 | Status: SHIPPED | OUTPATIENT
Start: 2022-10-13 | End: 2022-10-14 | Stop reason: SDUPTHER

## 2022-10-13 NOTE — PROGRESS NOTES
OSW received SW referral for new pt  OSW will place outreach TC to complete the distress thermometer and psychosocial assessment

## 2022-10-13 NOTE — TELEPHONE ENCOUNTER
Pt contacted Call Center requested refill of their medication  Medication Name: gabapentin (NEURONTIN)       Dosage of Med: 300 mg capsule      Frequency of Med: Take 1 capsule (300 mg total) by mouth 3 (three) times a day       Remaining Medication: not sure - call dropped       Pharmacy and Location: [place], [place]         Pt  Preferred Callback Phone Number: [phone]      PLEASE ADVISE PATIENTS:    REFILL REQUESTS WILL BE PROCESSED WITHIN 24-48 HOURS

## 2022-10-13 NOTE — TELEPHONE ENCOUNTER
Cassidy Escoto: Patient     Doctor: Alissa Castro    Reason for call: script was sent to the the wrong pharmacy   Please sent to   [facility], [address]    Call back#: [phone]

## 2022-10-13 NOTE — LETTER
2022     Vickey German MD  [address]  [address] [facility] [address]    Patient: Mariela Mathews  YOB: 1945  Date of Visit: 10/13/2022      Dear Dr Sally Cho:    Thank you for referring Ivania Hooks to me for evaluation  Below are my notes for this consultation  If you have questions, please do not hesitate to call me  I look forward to following your patient along with you  Sincerely,        Ngoc Montoya        CC: Lillian Cancino MD  [facility]        Pre-Test Genetic Counseling Consult Note    Patient Name: Mariela Mathews   /Age:  y o  Referring Provider: Lillian Cancino MD    Date of Service: 10/13/2022  Genetic Counselor: Ngoc Montoya MS, [facility]  Interpretation Services: None  [facility]      Palmira Cortes was referred to the [address] [place] [address] and Genetic Assessment Program due to his family history of breast and prostate cancer and recent diagnosis of prostate cancer  he presents today to discuss the possibility of a hereditary cancer syndrome, options for genetic testing, and implications for him and his family  His daughter, Henny Quinn, accompanied him to the appointment  Cancer History and Treatment:   Personal History:   Cancer Staging  Prostate cancer [facility])  Staging form: Prostate, AJCC 8th Edition  - Clinical stage from 10/5/2022: Stage IIC (cT1c, cN0, cM0, PSA: 18 2, Grade Group: 3) - Signed by Lillian Cancino MD on 10/5/2022  Histopathologic type:  Adenocarcinoma, NOS  Stage prefix: Initial diagnosis  Prostate specific antigen (PSA) range: 10 to 19  Himanshu primary pattern: 4  [place] secondary pattern: 3  [place] score: 7  Histologic grading system: 5 grade system    Screening Hx:   Not assessed    Medical and Surgical History  Pertinent surgical history:   Past Surgical History:   Procedure Laterality Date   • APPENDECTOMY     • CARDIAC CATHETERIZATION  03/18/2021    left main with no significant disease, proximal LAD with 10% stenosis at the site of prior stent, left circumflex artery with minimal luminal irregularities mid RCA with 50% stenosis at site of prior stent and PL segment with distal disease supplied by collaterals from the distal circumflex with no significant CAD requiring revascularization at that time     • CATARACT EXTRACTION, BILATERAL Bilateral    • COLONOSCOPY     • CORONARY ANGIOPLASTY WITH STENT PLACEMENT  1999    RCA   • CORONARY ANGIOPLASTY WITH STENT PLACEMENT  2001    RCA   • CORONARY ANGIOPLASTY WITH STENT PLACEMENT  2003    LAD   • EYE SURGERY     • NE BIOPSY OF PROSTATE,NEEDLE,TRANSPERINEAL N/A 09/13/2022    Procedure: TRANSPERINEAL MRI FUSION  BIOPSY PROSTATE;  Surgeon: Sofía Fonseca MD;  Location: [facility];  Service: Urology   • NE REVISE MEDIAN N/CARPAL TUNNEL SURG Left 07/22/2022    Procedure: RELEASE CARPAL TUNNEL;  Surgeon: Vy Wynn MD;  Location[facility];  Service: Orthopedics   • NE REVISE ULNAR NERVE AT ELBOW Left 07/22/2022    Procedure: Eri Mckeon;  Surgeon: Vy Wynn MD;  [facility];  Service: Orthopedics   • NE REVISE ULNAR NERVE AT WRIST Left 07/22/2022    Procedure: Ivania Duran;  Surgeon: Vy Wynn MD;  Location[facility];  Service: Orthopedics   • SKIN CANCER EXCISION  2012    chin-per pt, basal cell  also right ankle      Pertinent medical history:  Past Medical History:   Diagnosis Date   • Bilateral carotid artery stenosis    • Chronic diastolic heart failure ([facility] [address] )    • Chronic ischemic heart disease    • Colon polyp    • COPD (chronic obstructive pulmonary disease) ([facility] [address] )    • Coronary artery disease     hx stents, MI, PCI   • CPAP (continuous positive airway pressure) dependence    • Hearing loss    • Hyperlipidemia    • Hypertension    • MI (myocardial infarction) ([facility] [address] ) 1995   • Myocardial infarction ([address] ) • Pneumonia    • Prostate cancer ([facility] [address] )    • S/P carotid endarterectomy    • Shortness of breath    • Sleep apnea    • Sleep apnea, obstructive    • Stented coronary artery          Other History:  Height:   Ht Readings from Last 1 Encounters:   10/05/22 5' 8" (1 727 m)     Weight:   Wt Readings from Last 1 Encounters:   10/05/22 99 1 kg (218 lb 7 6 oz)       Relevant Family History     Maternal Family History:  Aunt (d 99) history of breast cancer (Dx 48)   Daughter (77) history of thyroid cancer   Son ([de-identified]) history of prostate cancer (dx 67) underwent prostatectomy  Uncle with possible blood cancer    Paternal Family History:   No information    Please refer to the scanned pedigree in the Media Tab for a complete family history     *All history is reported as provided by the patient; records are not available for review, except where indicated  Assessment:  We discussed sporadic, familial and hereditary cancer  We also discussed the many factors that influence our risk for cancer such as age, environmental exposures, lifestyle choices and family history  We reviewed the indications suggestive of a hereditary predisposition to cancer  Genetic testing is indicated for Chana Palma based on the following criteria: Meets NCCN V1 2023 Testing Criteria for Prostate Cancer Susceptibility Genes: personal history of prostate cancer with 2 or more close blood relatives diagnosed with either breast or prostate cancer at any age    The risks, benefits, and limitations of genetic testing were reviewed with the patient, as well as genetic discrimination laws, and possible test results (positive, negative, variants of uncertain significance) and their clinical implications  If positive for a mutation, options for managing cancer risk including increased surveillance, chemoprevention, and in some cases prophylactic surgery were discussed   Chana Palma was informed that if a hereditary cancer syndrome was identified in him, first degree relatives (parents, siblings, and children) have a chance of also inheriting the condition  Genetic testing would allow for predictive genetic testing in other relatives, who may also be at risk depending on their degree of relation  Plan: Patient decided to proceed with testing and provided consent  Summary:     Sample Collection:  The patient was given a blood kit and instructed to go to a [facility] lab    Genetic Testing Preformed: [facility] + RNA (36 genes): APC, FILEMON, AXIN2 BARD1, BRCA1, BRCA2, BRIP1, BMPR1A, CDH1, CDK4, CDKN2A, CHEK2, DICER1, EPCAM, GREM1, HOXB13, MLH1, MSH2, MSH3, MSH6, MUTYH, NBN, NF1, NTHL1, PALB2, PMS2, POLD1, POLE, PTEN, RAD51C, RAD51D, RECQL SMAD4, SMARCA4, STK11, TP53      Results take approximately 2-3 weeks to complete once test is started  We will contact Georgie Newell once results are available  Additional recommendations for surveillance/medical management will be made pending genetic test results

## 2022-10-13 NOTE — TELEPHONE ENCOUNTER
I called t/Medicare Prior Auth ([phone]) and spoke with Christina  She stated that [facility] for ADT as of 1/1/22 are required to using ONLY Eligard 45mg  Prior authorization initiated via phone; final determination was received immediately  Prior Authorization for Eligard 45mg (6 month) kit was APPROVED (Auth#:  [id number]) for 1 visit and valid from 10/13/22 to 4/13/23  Office stock okay to use

## 2022-10-13 NOTE — PROGRESS NOTES
Cardiology Follow Up     Christiano Rodriguez  [id number]  1945  [facility] [address] [address]      1  Chronic ischemic heart disease     2  Primary hypertension     3  Mixed hyperlipidemia     4  Class 1 obesity due to excess calories with body mass index (BMI) of 32 0 to 32 9 in adult, unspecified whether serious comorbidity present         Discussion/Summary:  1  Coronary artery disease with PCI and recent cardiac catheterization March of 2021 with stable disease without need for revascularization  2  PVCs-currently asymptomatic  3  Mild aortic stenosis with mild aortic regurgitation  4  Mild-to-moderate mitral regurgitation  5  Hypertension  6  Hyperlipidemia      -transthoracic echocardiogram 09/29/2022 showing left ventricular systolic function mildly reduced estimated LVEF 45% with mild global hypokinesis, grade 1 diastolic dysfunction, mild aortic regurgitation with mild aortic stenosis, mild-to-moderate mitral regurgitation with a posteriorly directed jet with no significant change documented from prior study in January 2022    -as patient seems to be tolerating medical therapy well at this time will continue aspirin 81 mg daily, furosemide 40 mg daily, metoprolol succinate 75 mg in the morning and 50 mg in the evening, potassium 20 mEq daily (10 mEq in the morning and 10 mEq in the evening), Crestor 40 mg daily    -patient had swelling and cough documented in allergy list with lisinopril however patient believes he only had cough with that medicine will follow-up with his primary care physician to see if there was actual swelling if not may need to trial low-dose Arb therapy if patient only had cough  -patient counseled on dietary lifestyle modifications including following fluid and salt restricted diet to less than 1800 mg of sodium daily and less than 1800 mL of fluid daily  -I will see patient in 3 months or sooner if necessary  -patient counseled if he were to have any warning or alarm type symptoms he is to seek emergency medical care immediately  History of Present Illness:  - patient is a 21-year-old male with hypertension, hyperlipidemia, obesity, COPD with intermittent/occasional oxygen use on chronic antibiotic therapy, obstructive sleep apnea compliant with CPAP therapy, known coronary artery disease with MI in 1995 with PCI to RCA, stenting in 1999 and in 2001 involving RCA and then again in 2003 with stenting to LAD with cardiac catheterization March of 2021 showing no significant disease in left main with proximal LAD 10% stenosis at site of prior stent, left circumflex artery with minimal luminal irregularities and mid RCA with 50% stenosis at site of prior stent along with posterior lateral segments with disease supplied by collaterals from the distal circumflex and no significant coronary disease requiring revascularization at that time  Patient was also found to have reduced LV systolic function with significant PVC burden and was seen and evaluated by electrophysiology however in the setting of chronic antibiotic therapy was recommended the patient if able to tolerate uptitration beta-blocker therapy trial this prior to alternative medical therapy and unfortunately was just recently diagnosed with prostate cancer and is stage II after recent PET scan with plans to initiate radiation therapy as he was told he is not a surgical candidate at this time  -currently in the office today he denies any chest pain, palpitations, lightheadedness or dizziness, loss of consciousness and states shortness of breath is at baseline  States that overall he seems to be doing reasonably well from a cardiac standpoint and notes that blood pressures have been stable at home on current regimen and overall feels reasonably well      Patient Active Problem List   Diagnosis   • Hyperlipidemia   • Coronary artery disease involving native coronary artery of native heart without angina pectoris   • Bilateral carotid artery stenosis   • Hypertension   • Chronic obstructive pulmonary disease (COPD) ([facility])   • Oxygen dependent   • Depression   • S/P carotid endarterectomy   • Stented coronary artery   • Vertigo   • Anisometropia   • Osteoarthritis of spinal facet joint   • Chronic hypokalemia   • Chronic ischemic heart disease   • Chronic diastolic (congestive) heart failure ([facility])   • Diverticular disease of colon   • Dry eye syndrome   • Gastroesophageal reflux disease   • Hearing loss   • Elevated PSA   • Hyperglycemia   • Hypoxia   • Lens replaced by other means   • Lumbar radiculopathy   • Multinodular goiter   • Nuclear senile cataract   • Obesity   • Occlusion and stenosis of carotid artery   • Osteoarthritis of hip   • Prediabetes   • Psychosexual dysfunction with inhibited sexual excitement   • Sleep apnea   • Solitary pulmonary nodule   • Thyroid nodule   • Visual disturbance   • Generalized abdominal pain   • Guyon syndrome, left   • Costochondral chest pain   • Intermittent chest pain   • Abnormal nuclear stress test   • Discoloration of skin   • Right flank pain   • Right hip pain   • Primary insomnia   • Chronic right-sided thoracic back pain   • Abdominal bloating   • Stage 3a chronic kidney disease (HCC)   • Hoarseness or changing voice   • Chronic bilateral low back pain with right-sided sciatica   • Mid back pain   • Thoracic radiculopathy   • Chronic pain syndrome   • Lightheadedness   • Urinary frequency   • Incomplete bladder emptying   • Intercostal neuralgia   • Cubital tunnel syndrome on left   • Carpal tunnel syndrome on left   • Rib pain   • Elevated MCV   • Shakiness   • Prostate cancer [facility])     Past Medical History:   Diagnosis Date   • Bilateral carotid artery stenosis    • Chronic diastolic heart failure ([facility])    • Chronic ischemic heart disease • Colon polyp    • COPD (chronic obstructive pulmonary disease) (HCC)    • Coronary artery disease     hx stents, MI, PCI   • CPAP (continuous positive airway pressure) dependence    • Hearing loss    • Hyperlipidemia    • Hypertension    • [facility]    • Myocardial infarction ([facility] )    • Pneumonia    • Prostate cancer ([facility] )    • S/P carotid endarterectomy    • Shortness of breath    • Sleep apnea    • Sleep apnea, obstructive    • Stented coronary artery      Social History     Socioeconomic History   • Marital status: /Civil Union     Spouse name: Not on file   • Number of children: Not on file   • Years of education: Not on file   • Highest education level: Not on file   Occupational History   • Not on file   Tobacco Use   • Smoking status: Former Smoker     Packs/day: 2 00     Years: 35 00     Pack years: 70 00     Quit date: 1995     Years since quittin 2   • Smokeless tobacco: Never Used   Vaping Use   • Vaping Use: Never used   Substance and Sexual Activity   • Alcohol use: Not Currently   • Drug use: Never   • Sexual activity: Not on file   Other Topics Concern   • Not on file   Social History Narrative   • Not on file     Social Determinants of Health     Financial Resource Strain: Not on file   Food Insecurity: Not on file   Transportation Needs: Not on file   Physical Activity: Not on file   Stress: Not on file   Social Connections: Not on file   Intimate Partner Violence: Not on file   Housing Stability: Not on file      Family History   Problem Relation Age of Onset   • Heart attack Mother    • Dementia Mother      Past Surgical History:   Procedure Laterality Date   • APPENDECTOMY     • CARDIAC CATHETERIZATION  2021    left main with no significant disease, proximal LAD with 10% stenosis at the site of prior stent, left circumflex artery with minimal luminal irregularities mid RCA with 50% stenosis at site of prior stent and PL segment with distal disease supplied by collaterals from the distal circumflex with no significant CAD requiring revascularization at that time     • CATARACT EXTRACTION, BILATERAL Bilateral    • COLONOSCOPY     • CORONARY ANGIOPLASTY WITH STENT PLACEMENT  1999    RCA   • CORONARY ANGIOPLASTY WITH STENT PLACEMENT  2001    RCA   • CORONARY ANGIOPLASTY WITH STENT PLACEMENT  2003    LAD   • EYE SURGERY     • WA BIOPSY OF PROSTATE,NEEDLE,TRANSPERINEAL N/A 09/13/2022    Procedure: TRANSPERINEAL MRI FUSION  BIOPSY PROSTATE;  Surgeon: Viona Peabody, MD;  Location: [facility];  Service: Urology   • WA REVISE MEDIAN N/CARPAL TUNNEL SURG Left 07/22/2022    Procedure: RELEASE CARPAL TUNNEL;  Surgeon: Leila Harris MD;  Location: [facility];  Service: Orthopedics   • WA REVISE ULNAR NERVE AT ELBOW Left 07/22/2022    Procedure: RELEASE CUBITAL TUNNEL;  Surgeon: Leila Harris MD;  Location[facility];  Service: Orthopedics   • WA REVISE ULNAR NERVE AT WRIST Left 07/22/2022    Procedure: Patricia Factor;  Surgeon: Leila Harris MD;  Location: [facility];  Service: Orthopedics   • SKIN CANCER EXCISION  2012    chin-per pt, basal cell  also right ankle       Current Outpatient Medications:   •  albuterol (2 5 mg/3 mL) 0 083 % nebulizer solution, Take 3 mL (2 5 mg total) by nebulization every 6 (six) hours as needed for wheezing or shortness of breath, Disp: 60 mL, Rfl: 1  •  aspirin 81 mg chewable tablet, CHEW ONE TABLET BY MOUTH EVERY DAY, Disp: , Rfl:   •  azithromycin (ZITHROMAX) 250 mg tablet, Take 1 every Monday, Wednesday and Friday , Disp: 45 tablet, Rfl: 3  •  Blood Pressure Monitor KIT, Use daily (Patient taking differently: Use 40 mg daily), Disp: 1 kit, Rfl: 0  •  famotidine (PEPCID) 20 mg tablet, Take 20 mg by mouth daily at bedtime, Disp: , Rfl:   •  fluticasone (FLONASE) 50 mcg/act nasal spray, into each nostril as needed , Disp: , Rfl:   •  fluticasone-salmeterol (Advair) 500-50 mcg/dose inhaler, Inhale 1 puff in the morning Rinse mouth after use , Disp: , Rfl:   •  furosemide (LASIX) 40 mg tablet, Take 1 tablet (40 mg total) by mouth daily, Disp: 100 tablet, Rfl: 3  •  gabapentin (NEURONTIN) 300 mg capsule, Take 1 capsule (300 mg total) by mouth 3 (three) times a day, Disp: 90 capsule, Rfl: 1  •  metoprolol succinate (TOPROL-XL) 25 mg 24 hr tablet, Take 1 tablet (25 mg total) by mouth daily (Patient taking differently: Take 75 mg by mouth daily in the early morning), Disp: 90 tablet, Rfl: 3  •  metoprolol succinate (TOPROL-XL) 50 mg 24 hr tablet, Take 1 tablet (50 mg total) by mouth 2 (two) times a day (Patient taking differently: Take 50 mg by mouth daily at bedtime), Disp: 180 tablet, Rfl: 3  •  Multiple Vitamin (multivitamin) tablet, Take 1 tablet by mouth daily, Disp: , Rfl:   •  nitroglycerin (NITROSTAT) 0 4 mg SL tablet, DISSOLVE ONE TABLET UNDER THE TONGUE  PRN, Disp: , Rfl:   •  potassium chloride (Klor-Con) 10 mEq tablet, Take 2 tablets (20 mEq total) by mouth daily, Disp: 200 tablet, Rfl: 3  •  rosuvastatin (CRESTOR) 40 MG tablet, Take 1 tablet (40 mg total) by mouth daily, Disp: 100 tablet, Rfl: 3  •  Tiotropium Bromide Monohydrate (SPIRIVA RESPIMAT IN), Inhale every morning, Disp: , Rfl:   •  Ascorbic Acid (vitamin C) 1000 MG tablet, Take 1,000 mg by mouth daily (Patient not taking: No sig reported), Disp: , Rfl:   •  methocarbamol (ROBAXIN) 500 mg tablet, Take 1 tablet (500 mg total) by mouth 3 (three) times a day as needed for muscle spasms (Patient not taking: No sig reported), Disp: 30 tablet, Rfl: 0  Allergies   Allergen Reactions   • Lisinopril Swelling and Cough   • Tetanus Antitoxin Anaphylaxis   • Tetanus Toxoid Anaphylaxis and Swelling         Labs:  Hospital Outpatient Visit on 09/29/2022   Component Date Value   • Aortic valve mean veloci* 09/29/2022 13 30    • Triscuspid Valve Regurgi* 09/29/2022 31 0    • Tricuspid valve peak reg* 09/29/2022 2 80    • MV E' Tissue Velocity Se* 09/29/2022 7    • TR Peak Vega 09/29/2022 2 8    • LVOT mn grad 09/29/2022 2 0    • AV mean gradient 09/29/2022 8    • AV LVOT peak gradient 09/29/2022 5    • MV valve area p 1/2 meth* 09/29/2022 3 06    • E wave deceleration time 09/29/2022 248    • LVOT peak vega 09/29/2022 1 01    • LVOT peak VTI 09/29/2022 21 9    • Aortic valve peak veloci* 09/29/2022 2 0    • Ao VTI 09/29/2022 41 27    • AV peak gradient 09/29/2022 15    • MV Peak E Vega 09/29/2022 63    • MV Peak A Vega 09/29/2022 1 25    • MV stenosis pressure 1/2* 09/29/2022 72    • Dimensionless velociy in* 09/29/2022 0 51    • LV EF 09/29/2022 45    • GLS 09/29/2022 -16    • IVC 09/29/2022 2 1    • Right Ventricular Peak S* 09/29/2022 39 00    • Est  RA pres 09/29/2022 8 0    Admission on 09/13/2022, Discharged on 09/13/2022   Component Date Value   • Case Report 09/13/2022                      Value:Surgical Pathology Report                         Case: [id number]                                   Authorizing Provider:  Enzo Boyd MD            Collected:           09/13/2022 1155              Ordering Location:     [facility]      Received:            09/13/2022 [facility]                                                           Pathologist:           Parish Villegas DO                                                     Specimens:   A) - Prostate, SUZY - Right anterior medial x 2                                                      B) - Prostate, right anterior lateral                                                               C) - Prostate, left anterior medial                                                                 D) - Prostate, left anterior lateral                                                                E) - Prostate, left posterior lateral                                                                                         F) - Prostate, left posterior medial G) - Prostate, left base                                                                            H) - Prostate, right posterior lateral                                                              I) - Prostate, right posterior medial                                                               J) - Prostate, right base                                                                 • Final Diagnosis 09/13/2022                      Value: This result contains rich text formatting which cannot be displayed here  • Note 09/13/2022                      Value: This result contains rich text formatting which cannot be displayed here  • Additional Information 09/13/2022                      Value: This result contains rich text formatting which cannot be displayed here  • Gross Description 09/13/2022                      Value: This result contains rich text formatting which cannot be displayed here     Admission on 09/10/2022, Discharged on 09/10/2022   Component Date Value   • WBC 09/10/2022 10 80 (A)   • RBC 09/10/2022 5 28    • Hemoglobin 09/10/2022 16 9    • Hematocrit 09/10/2022 54 3 (A)   • MCV 09/10/2022 103 (A)   • MCH 09/10/2022 32 0    • MCHC 09/10/2022 31 1 (A)   • RDW 09/10/2022 13 9    • MPV 09/10/2022 10 4    • Platelets 96/65/4830 173    • nRBC 09/10/2022 0    • Neutrophils Relative 09/10/2022 72    • Immat GRANS % 09/10/2022 0    • Lymphocytes Relative 09/10/2022 13 (A)   • Monocytes Relative 09/10/2022 12    • Eosinophils Relative 09/10/2022 2    • Basophils Relative 09/10/2022 1    • Neutrophils Absolute 09/10/2022 7 80 (A)   • Immature Grans Absolute 09/10/2022 0 03    • Lymphocytes Absolute 09/10/2022 1 43    • Monocytes Absolute 09/10/2022 1 30 (A)   • Eosinophils Absolute 09/10/2022 0 19    • Basophils Absolute 09/10/2022 0 05    • Sodium 09/10/2022 141    • Potassium 09/10/2022 3 7    • Chloride 09/10/2022 101    • CO2 09/10/2022 34 (A)   • ANION GAP 09/10/2022 6    • BUN 09/10/2022 21    • Creatinine 09/10/2022 1 21    • Glucose 09/10/2022 102    • Calcium 09/10/2022 9 3    • AST 09/10/2022 24    • ALT 09/10/2022 21    • Alkaline Phosphatase 09/10/2022 67    • Total Protein 09/10/2022 6 5    • Albumin 09/10/2022 4 1    • Total Bilirubin 09/10/2022 0 51    • eGFR 09/10/2022 56    • hs TnI 0hr 09/10/2022 13    • hs TnI 2hr 09/10/2022 12    • Delta 2hr hsTnI 09/10/2022 -1    • Ventricular Rate 09/10/2022 78    • Atrial Rate 09/10/2022 78    • AZ Interval 09/10/2022 172    • QRSD Interval 09/10/2022 94    • QT Interval 09/10/2022 424    • QTC Interval 09/10/2022 483    • P Axis 09/10/2022 58    • QRS Axis 09/10/2022 53    • T Wave Axis 09/10/2022 38    • Ventricular Rate 09/10/2022 71    • Atrial Rate 09/10/2022 71    • AZ Interval 09/10/2022 176    • QRSD Interval 09/10/2022 96    • QT Interval 09/10/2022 428    • QTC Interval 09/10/2022 465    • P Axis 09/10/2022 60    • QRS Axis 09/10/2022 63    • T Wave Axis 09/10/2022 18    • Ventricular Rate 09/10/2022 69    • Atrial Rate 09/10/2022 69    • AZ Interval 09/10/2022 174    • QRSD Interval 09/10/2022 96    • QT Interval 09/10/2022 430    • QTC Interval 09/10/2022 460    • P Axis 09/10/2022 59    • QRS Axis 09/10/2022 61    • T Wave Axis 09/10/2022 16    • Ventricular Rate 09/10/2022 64    • Atrial Rate 09/10/2022 64    • AZ Interval 09/10/2022 180    • QRSD Interval 09/10/2022 96    • QT Interval 09/10/2022 434    • QTC Interval 09/10/2022 447    • P Axis 09/10/2022 70    • QRS Axis 09/10/2022 64    • T Wave Axis 09/10/2022 36    Lab on 09/06/2022   Component Date Value   • Urine Culture 09/06/2022 No Growth <1000 cfu/mL    • Sodium 09/06/2022 141    • Potassium 09/06/2022 4 3    • Chloride 09/06/2022 105    • CO2 09/06/2022 31    • ANION GAP 09/06/2022 5    • BUN 09/06/2022 19    • Creatinine 09/06/2022 1 29    • Glucose, Fasting 09/06/2022 91    • Calcium 09/06/2022 9 4    • AST 09/06/2022 30    • ALT 09/06/2022 30    • Alkaline Phosphatase 09/06/2022 79    • Total Protein 09/06/2022 6 7    • Albumin 09/06/2022 3 7    • Total Bilirubin 09/06/2022 0 60    • eGFR 09/06/2022 52    • WBC 09/06/2022 9 36    • RBC 09/06/2022 5 30    • Hemoglobin 09/06/2022 16 7    • Hematocrit 09/06/2022 54 8 (A)   • MCV 09/06/2022 103 (A)   • MCH 09/06/2022 31 5    • MCHC 09/06/2022 30 5 (A)   • RDW 09/06/2022 14 6    • MPV 09/06/2022 11 4    • Platelets 90/72/2664 185    • nRBC 09/06/2022 0    • Neutrophils Relative 09/06/2022 71    • Immat GRANS % 09/06/2022 0    • Lymphocytes Relative 09/06/2022 13 (A)   • Monocytes Relative 09/06/2022 13 (A)   • Eosinophils Relative 09/06/2022 2    • Basophils Relative 09/06/2022 1    • Neutrophils Absolute 09/06/2022 6 68    • Immature Grans Absolute 09/06/2022 0 04    • Lymphocytes Absolute 09/06/2022 1 20    • Monocytes Absolute 09/06/2022 1 20    • Eosinophils Absolute 09/06/2022 0 17    • Basophils Absolute 09/06/2022 0 07    • Ventricular Rate 09/06/2022 64    • Atrial Rate 09/06/2022 64    • UT Interval 09/06/2022 174    • QRSD Interval 09/06/2022 96    • QT Interval 09/06/2022 442    • QTC Interval 09/06/2022 455    • P Axis 09/06/2022 69    • QRS Axis 09/06/2022 60    • T Wave [place] 09/06/2022 47    Appointment on 08/29/2022   Component Date Value   • A  ALTERNATA 08/29/2022 <0 10    • A  FUMIGATUS 08/29/2022 <0 10    • [place] Grass 08/29/2022 <0 10    • [place]  08/29/2022 <0 10    • Cat Epithellium-Dander 08/29/2022 <0 10    • C  HERBARUM 08/29/2022 <0 10    • Cockroach 08/29/2022 0 32 (A)   • Common Silver [place] Memory 08/29/2022 <0 10    • [place]yer 08/29/2022 <0 10    • D  farinae 08/29/2022 <0 10    • D  pteronyssinus 08/29/2022 <0 10    • Dog Dander 08/29/2022 <0 10    • Elm IgE 08/29/2022 <0 10    • [place] 60 08/29/2022 <0 10    • Mugwort 08/29/2022 <0 10    • [place] Tree 08/29/2022 <0 10    • Oak 08/29/2022 <0 10    • P CHRYSOGENUM 08/29/2022 <0 10    • Rough Pigweed  IgE 08/29/2022 <0 10    • Common Ragweed 08/29/2022 <0 10    • Sheep [place] IgE 08/29/2022 <0 10    • [place] Tree 08/29/2022 <0 10    • [place] Grass 08/29/2022 <0 10    • [address] Tree 08/29/2022 <0 10    • White [place] Tree 08/29/2022 <0 10    • IgE 08/29/2022 88 1    • MOUSE URINE 08/29/2022 <0 10    • Sodium 08/29/2022 140    • Potassium 08/29/2022 4 1    • Chloride 08/29/2022 101    • CO2 08/29/2022 33 (A)   • ANION GAP 08/29/2022 6    • BUN 08/29/2022 25    • Creatinine 08/29/2022 1 41 (A)   • Glucose 08/29/2022 93    • Calcium 08/29/2022 9 1    • eGFR 08/29/2022 47    Appointment on 08/24/2022   Component Date Value   • WBC 08/24/2022 8 83    • RBC 08/24/2022 5 32    • Hemoglobin 08/24/2022 16 9    • Hematocrit 08/24/2022 54 4 (A)   • MCV 08/24/2022 102 (A)   • MCH 08/24/2022 31 8    • MCHC 08/24/2022 31 1 (A)   • RDW 08/24/2022 14 6    • MPV 08/24/2022 11 3    • Platelets 48/59/4890 195    • nRBC 08/24/2022 0    • Neutrophils Relative 08/24/2022 68    • Immat GRANS % 08/24/2022 1    • Lymphocytes Relative 08/24/2022 15    • Monocytes Relative 08/24/2022 13 (A)   • Eosinophils Relative 08/24/2022 2    • Basophils Relative 08/24/2022 1    • Neutrophils Absolute 08/24/2022 6 22    • Immature Grans Absolute 08/24/2022 0 04    • Lymphocytes Absolute 08/24/2022 1 28    • Monocytes Absolute 08/24/2022 1 10    • Eosinophils Absolute 08/24/2022 0 13    • Basophils Absolute 08/24/2022 0 06         Imaging: NM PET CT skull base to mid thigh    Result Date: 9/30/2022  Narrative: PYLARIFY PSMA PET/CT SCAN INDICATION:  C61: Malignant neoplasm of prostate   , initial staging MODIFIER: PI COMPARISON: MRI prostate 7/11/2022 and priors CELL TYPE:  Prostatic adenocarcinoma, prostate biopsy 9/13/2022 TECHNIQUE:   9 6 mCi F-18 Pylarify PSMA administered IV  Multiplanar attenuation corrected and non attenuation corrected PET images were acquired 60 minutes post injection   Contiguous, low dose, axial CT sections were obtained from the vertex through the femurs   Intravenous or oral contrast was not utilized  This examination, like all CT scans performed in the [facility], was performed utilizing techniques to minimize radiation dose exposure, including the use of iterative reconstruction and automated exposure control  FINDINGS: VISUALIZED BRAIN:   No acute abnormalities are seen  HEAD/NECK:   There is a physiologic distribution of the radiotracer  No PSMA avid cervical adenopathy is seen  CT images: Unremarkable  CHEST:   No PSMA avid soft tissue lesions are seen  CT images: Gynecomastia  Coronary atherosclerosis  Emphysema  Right middle, left lingular scarring  ABDOMEN:   No PSMA avid soft tissue lesions are seen  CT images: Colon diverticula  PELVIS: There is a PSMA avid lesion in the prostate towards the apex, and slightly to the right of midline, SUV measuring 10 5, most compatible with malignancy  This corresponds with the lesion seen on prior MRI  This measures approximately 1 x 1 5 x 2 cm based on the PET images  See series 1200 image 260 and series 1201 image 57  No additional PSMA avid soft tissue lesions are seen in the pelvis  No PSMA avid adenopathy  CT images: Stable  OSSEOUS STRUCTURES: No PSMA avid lesions are seen  CT images: Spine degenerative change  Impression: 1  Intensely PSMA avid lesion in the prostate towards the apex and slightly to the right of midline, most compatible with malignancy  This corresponds with the lesion seen on prior MRI  2   No PSMA avid metastases in the pelvis  3   No PSMA avid metastases in the neck, chest, upper abdomen or osseous structures  Workstation performed: [id number]     Echo follow up/limited w/ contrast if indicated    Result Date: 9/29/2022  Narrative: •  Left Ventricle: Left ventricular cavity size is normal  Wall thickness is normal  The left ventricular ejection fraction is 45%  Systolic function is mildly reduced  Global longitudinal strain is borderline at -16%  There is mild global hypokinesis with regional variation  Diastolic function is mildly abnormal, consistent with grade I (abnormal) relaxation  •  Aortic Valve: There is mild regurgitation  There is mild stenosis  There is aortic sclerosis  The aortic valve peak velocity is 2 0 m/s  •  Mitral Valve: There is mild to moderate regurgitation with a posteriorly directed jet  •  Tricuspid Valve: The right ventricular systolic pressure is mildly elevated  The estimated right ventricular systolic pressure is 03 91 mmHg  •  Prior TTE study available for comparison  Prior study date: 1/14/2022  No significant changes noted compared to the prior study  Review of Systems:  Review of Systems   Constitutional: Negative for chills, diaphoresis, fatigue and fever  HENT: Negative for trouble swallowing and voice change  Respiratory: Negative for shortness of breath and wheezing  Cardiovascular: Negative for chest pain, palpitations and leg swelling  Gastrointestinal: Negative for abdominal pain, constipation, diarrhea, nausea and vomiting  Genitourinary: Negative for dysuria  Musculoskeletal: Positive for arthralgias  Negative for neck pain and neck stiffness  Skin: Negative for rash  Neurological: Negative for dizziness, syncope, light-headedness and headaches  Psychiatric/Behavioral: Negative for agitation and confusion  All other systems reviewed and are negative  Vitals:    10/13/22 1359   BP: 124/70   Pulse: 70   Weight: 98 4 kg (217 lb)   Height: 5' 8" (1 727 m)     Vitals:    10/13/22 1359   Weight: 98 4 kg (217 lb)     Height: 5' 8" (172 7 cm)     Physical Exam:  Physical Exam  Vitals reviewed  Constitutional:       General: He is not in acute distress  Appearance: He is obese  He is not diaphoretic  HENT:      Head: Normocephalic and atraumatic  Neck:      Vascular: No JVD  Trachea: No tracheal deviation     Cardiovascular:      Rate and Rhythm: Normal rate and regular rhythm  Heart sounds: Murmur heard  Systolic murmur is present  No friction rub  Pulmonary:      Effort: No tachypnea or respiratory distress  Breath sounds: Decreased breath sounds present  No wheezing  Abdominal:      General: Bowel sounds are normal       Palpations: Abdomen is soft  Tenderness: There is no abdominal tenderness  There is no rebound  Musculoskeletal:      Right lower leg: No edema  Left lower leg: No edema  Skin:     General: Skin is warm and dry  Neurological:      Mental Status: He is alert  Comments: Awake, alert, able to answer questions appropriately, able move extremities bilaterally     Psychiatric:         Mood and Affect: Mood normal          Behavior: Behavior normal

## 2022-10-13 NOTE — PROGRESS NOTES
Pre-Test Genetic Counseling Consult Note    Patient Name: Aaron Wilder   /Age: /76 y o  Referring Provider: Levi Ventura MD    Date of Service: 10/13/2022  Genetic Counselor: Luciano Melo MS, [facility] [place]  Interpretation Services: None  [facility]      Mc Avalos was referred to the [address] [place] [address] and Genetic Assessment Program due to his family history of breast and prostate cancer and recent diagnosis of prostate cancer  he presents today to discuss the possibility of a hereditary cancer syndrome, options for genetic testing, and implications for him and his family  His daughter, Al Recinos, accompanied him to the appointment  Cancer History and Treatment:   Personal History:   Cancer Staging  Prostate cancer [facility])  Staging form: Prostate, AJCC 8th Edition  - Clinical stage from 10/5/2022: Stage IIC (cT1c, cN0, cM0, PSA: 18 2, Grade Group: 3) - Signed by Levi Ventura MD on 10/5/2022  Histopathologic type: Adenocarcinoma, NOS  Stage prefix: Initial diagnosis  Prostate specific antigen (PSA) range: 10 to 19  [place] primary pattern: 4  [place]hu secondary pattern: 3  [place] score: 7  Histologic grading system: 5 grade system    Screening Hx:   Not assessed    Medical and Surgical History  Pertinent surgical history:   Past Surgical History:   Procedure Laterality Date   • APPENDECTOMY     • CARDIAC CATHETERIZATION  2021    left main with no significant disease, proximal LAD with 10% stenosis at the site of prior stent, left circumflex artery with minimal luminal irregularities mid RCA with 50% stenosis at site of prior stent and PL segment with distal disease supplied by collaterals from the distal circumflex with no significant CAD requiring revascularization at that time     • CATARACT EXTRACTION, BILATERAL Bilateral    • COLONOSCOPY     • CORONARY ANGIOPLASTY WITH STENT PLACEMENT      RCA   • CORONARY ANGIOPLASTY WITH STENT PLACEMENT  2001    RCA   • CORONARY ANGIOPLASTY WITH STENT PLACEMENT  2003    LAD   • EYE SURGERY     • TX BIOPSY OF PROSTATE,NEEDLE,TRANSPERINEAL N/A 09/13/2022    Procedure: TRANSPERINEAL MRI FUSION  BIOPSY PROSTATE;  Surgeon: Jose Lainez MD;  Location: [facility];  Service: Urology   • TX REVISE MEDIAN N/CARPAL TUNNEL SURG Left 07/22/2022    Procedure: RELEASE CARPAL TUNNEL;  Surgeon: Temitope Christie MD;  Location: [facility];  Service: Orthopedics   • TX REVISE ULNAR NERVE AT ELBOW Left 07/22/2022    Procedure: Reji Peng;  Surgeon: Temitope Christie MD;  [facility];  Service: Orthopedics   • TX REVISE ULNAR NERVE AT WRIST Left 07/22/2022    Procedure: Tim Liu;  Surgeon: Temitope Christie MD;  Location[facility];  Service: Orthopedics   • SKIN CANCER EXCISION  2012    chin-per pt, basal cell  also right ankle      Pertinent medical history:  Past Medical History:   Diagnosis Date   • Bilateral carotid artery stenosis    • Chronic diastolic heart failure (HCC)    • Chronic ischemic heart disease    • Colon polyp    • COPD (chronic obstructive pulmonary disease) ([facility] [address] )    • Coronary artery disease     hx stents, MI, PCI   • CPAP (continuous positive airway pressure) dependence    • Hearing loss    • Hyperlipidemia    • Hypertension    • MI (myocardial infarction) ([facility] [address] ) 1995   • Myocardial infarction ([facility] [address] )    • Pneumonia    • Prostate cancer ([facility] [address] )    • S/P carotid endarterectomy    • Shortness of breath    • Sleep apnea    • Sleep apnea, obstructive    • Stented coronary artery          Other History:  Height:   Ht Readings from Last 1 Encounters:   10/05/22 5' 8" (1 727 m)     Weight:   Wt Readings from Last 1 Encounters:   10/05/22 99 1 kg (218 lb 7 6 oz)       Relevant Family History     Maternal Family History:  Aunt (d 99) history of breast cancer (Dx 48)   Daughter (68) history of thyroid cancer   Son (80) history of prostate cancer (dx 67) underwent prostatectomy  Uncle with possible blood cancer    Paternal Family History:   No information    Please refer to the scanned pedigree in the Media Tab for a complete family history     *All history is reported as provided by the patient; records are not available for review, except where indicated  Assessment:  We discussed sporadic, familial and hereditary cancer  We also discussed the many factors that influence our risk for cancer such as age, environmental exposures, lifestyle choices and family history  We reviewed the indications suggestive of a hereditary predisposition to cancer  Genetic testing is indicated for Mya Mcdaniel based on the following criteria: Meets NCCN V1 2023 Testing Criteria for Prostate Cancer Susceptibility Genes: personal history of prostate cancer with 2 or more close blood relatives diagnosed with either breast or prostate cancer at any age    The risks, benefits, and limitations of genetic testing were reviewed with the patient, as well as genetic discrimination laws, and possible test results (positive, negative, variants of uncertain significance) and their clinical implications  If positive for a mutation, options for managing cancer risk including increased surveillance, chemoprevention, and in some cases prophylactic surgery were discussed  Mya Mcdaniel was informed that if a hereditary cancer syndrome was identified in him, first degree relatives (parents, siblings, and children) have a chance of also inheriting the condition  Genetic testing would allow for predictive genetic testing in other relatives, who may also be at risk depending on their degree of relation  Plan: Patient decided to proceed with testing and provided consent      Summary:     Sample Collection:  The patient was given a blood kit and instructed to go to a [facility]'s lab    Genetic Testing Preformed: [facility] + RNA (36 genes): APC, FILEMON, AXIN2 BARD1, BRCA1, BRCA2, BRIP1, BMPR1A, CDH1, CDK4, CDKN2A, CHEK2, DICER1, EPCAM, GREM1, HOXB13, MLH1, MSH2, MSH3, MSH6, MUTYH, NBN, NF1, NTHL1, PALB2, PMS2, POLD1, POLE, PTEN, RAD51C, RAD51D, RECQL SMAD4, SMARCA4, STK11, TP53      Results take approximately 2-3 weeks to complete once test is started  We will contact Jaylene Collier once results are available  Additional recommendations for surveillance/medical management will be made pending genetic test results

## 2022-10-14 ENCOUNTER — APPOINTMENT (OUTPATIENT)
Dept: LAB | Facility: CLINIC | Age: 77
End: 2022-10-14
Payer: COMMERCIAL

## 2022-10-14 DIAGNOSIS — C61 MALIGNANT NEOPLASM OF PROSTATE (HCC): Primary | ICD-10-CM

## 2022-10-14 DIAGNOSIS — Z80.42 FAMILY HISTORY OF MALIGNANT NEOPLASM OF PROSTATE: ICD-10-CM

## 2022-10-14 DIAGNOSIS — Z80.0 FAMILY HISTORY OF MALIGNANT NEOPLASM OF GASTROINTESTINAL TRACT: ICD-10-CM

## 2022-10-14 DIAGNOSIS — Z80.3 FAMILY HISTORY OF MALIGNANT NEOPLASM OF BREAST: ICD-10-CM

## 2022-10-14 DIAGNOSIS — R25.1 SHAKINESS: ICD-10-CM

## 2022-10-14 DIAGNOSIS — R71.8 ELEVATED MCV: ICD-10-CM

## 2022-10-14 LAB
ANION GAP SERPL CALCULATED.3IONS-SCNC: 4 MMOL/L (ref 4–13)
BASOPHILS # BLD AUTO: 0.05 THOUSANDS/ΜL (ref 0–0.1)
BASOPHILS NFR BLD AUTO: 1 % (ref 0–1)
BUN SERPL-MCNC: 20 MG/DL (ref 5–25)
CALCIUM SERPL-MCNC: 9.5 MG/DL (ref 8.3–10.1)
CHLORIDE SERPL-SCNC: 105 MMOL/L (ref 96–108)
CO2 SERPL-SCNC: 30 MMOL/L (ref 21–32)
CREAT SERPL-MCNC: 1.42 MG/DL (ref 0.6–1.3)
EOSINOPHIL # BLD AUTO: 0.16 THOUSAND/ΜL (ref 0–0.61)
EOSINOPHIL NFR BLD AUTO: 2 % (ref 0–6)
ERYTHROCYTE [DISTWIDTH] IN BLOOD BY AUTOMATED COUNT: 14.4 % (ref 11.6–15.1)
GFR SERPL CREATININE-BSD FRML MDRD: 46 ML/MIN/1.73SQ M
GLUCOSE P FAST SERPL-MCNC: 106 MG/DL (ref 65–99)
HCT VFR BLD AUTO: 57.4 % (ref 36.5–49.3)
HGB BLD-MCNC: 18.1 G/DL (ref 12–17)
IMM GRANULOCYTES # BLD AUTO: 0.02 THOUSAND/UL (ref 0–0.2)
IMM GRANULOCYTES NFR BLD AUTO: 0 % (ref 0–2)
LYMPHOCYTES # BLD AUTO: 1.04 THOUSANDS/ΜL (ref 0.6–4.47)
LYMPHOCYTES NFR BLD AUTO: 13 % (ref 14–44)
MCH RBC QN AUTO: 31.5 PG (ref 26.8–34.3)
MCHC RBC AUTO-ENTMCNC: 31.5 G/DL (ref 31.4–37.4)
MCV RBC AUTO: 100 FL (ref 82–98)
MONOCYTES # BLD AUTO: 1.05 THOUSAND/ΜL (ref 0.17–1.22)
MONOCYTES NFR BLD AUTO: 13 % (ref 4–12)
NEUTROPHILS # BLD AUTO: 6.03 THOUSANDS/ΜL (ref 1.85–7.62)
NEUTS SEG NFR BLD AUTO: 71 % (ref 43–75)
NRBC BLD AUTO-RTO: 0 /100 WBCS
PLATELET # BLD AUTO: 174 THOUSANDS/UL (ref 149–390)
PMV BLD AUTO: 11.1 FL (ref 8.9–12.7)
POTASSIUM SERPL-SCNC: 4.4 MMOL/L (ref 3.5–5.3)
RBC # BLD AUTO: 5.74 MILLION/UL (ref 3.88–5.62)
SODIUM SERPL-SCNC: 139 MMOL/L (ref 135–147)
TSH SERPL DL<=0.05 MIU/L-ACNC: 0.95 UIU/ML (ref 0.45–4.5)
VIT B12 SERPL-MCNC: 549 PG/ML (ref 100–900)
WBC # BLD AUTO: 8.35 THOUSAND/UL (ref 4.31–10.16)

## 2022-10-14 PROCEDURE — 84443 ASSAY THYROID STIM HORMONE: CPT

## 2022-10-14 PROCEDURE — 36415 COLL VENOUS BLD VENIPUNCTURE: CPT

## 2022-10-14 PROCEDURE — 82607 VITAMIN B-12: CPT

## 2022-10-14 PROCEDURE — 80048 BASIC METABOLIC PNL TOTAL CA: CPT

## 2022-10-14 PROCEDURE — 85025 COMPLETE CBC W/AUTO DIFF WBC: CPT

## 2022-10-14 RX ORDER — GABAPENTIN 300 MG/1
300 CAPSULE ORAL 3 TIMES DAILY
Qty: 90 CAPSULE | Refills: 1 | Status: SHIPPED | OUTPATIENT
Start: 2022-10-14

## 2022-10-14 NOTE — TELEPHONE ENCOUNTER
S/w pt to confirm pharmacy  Pt states 90 day supply needs to go to [facility] due to insurance  Please send 90 day supply gabapentin to [facility]    Pt no longer uses [facility]  Tried to delete out of chart but was unable to

## 2022-10-15 ENCOUNTER — APPOINTMENT (EMERGENCY)
Dept: RADIOLOGY | Facility: HOSPITAL | Age: 77
End: 2022-10-15
Payer: COMMERCIAL

## 2022-10-15 ENCOUNTER — HOSPITAL ENCOUNTER (INPATIENT)
Facility: HOSPITAL | Age: 77
LOS: 1 days | Discharge: HOME | End: 2022-10-16
Attending: EMERGENCY MEDICINE | Admitting: INTERNAL MEDICINE
Payer: COMMERCIAL

## 2022-10-15 DIAGNOSIS — J44.1 COPD EXACERBATION (HCC): Primary | ICD-10-CM

## 2022-10-15 PROBLEM — E87.6 CHRONIC HYPOKALEMIA: Status: RESOLVED | Noted: 2021-01-11 | Resolved: 2022-10-15

## 2022-10-15 PROBLEM — R73.9 HYPERGLYCEMIA: Status: RESOLVED | Noted: 2021-01-11 | Resolved: 2022-10-15

## 2022-10-15 PROBLEM — R25.1 SHAKINESS: Status: RESOLVED | Noted: 2022-09-08 | Resolved: 2022-10-15

## 2022-10-15 PROBLEM — N18.31 STAGE 3A CHRONIC KIDNEY DISEASE (HCC): Chronic | Status: ACTIVE | Noted: 2022-04-14

## 2022-10-15 PROBLEM — R14.0 ABDOMINAL BLOATING: Status: RESOLVED | Noted: 2022-03-05 | Resolved: 2022-10-15

## 2022-10-15 PROBLEM — H53.9 VISUAL DISTURBANCE: Status: RESOLVED | Noted: 2021-01-11 | Resolved: 2022-10-15

## 2022-10-15 PROBLEM — I50.32 CHRONIC DIASTOLIC (CONGESTIVE) HEART FAILURE (HCC): Chronic | Status: ACTIVE | Noted: 2021-01-11

## 2022-10-15 PROBLEM — L81.9 DISCOLORATION OF SKIN: Status: RESOLVED | Noted: 2022-01-20 | Resolved: 2022-10-15

## 2022-10-15 PROBLEM — I10 BENIGN ESSENTIAL HYPERTENSION: Status: RESOLVED | Noted: 2021-01-11 | Resolved: 2022-10-15

## 2022-10-15 PROBLEM — K21.9 GASTROESOPHAGEAL REFLUX DISEASE: Chronic | Status: ACTIVE | Noted: 2021-01-11

## 2022-10-15 PROBLEM — R42 LIGHTHEADEDNESS: Status: RESOLVED | Noted: 2022-04-27 | Resolved: 2022-10-15

## 2022-10-15 PROBLEM — G47.30 SLEEP APNEA: Chronic | Status: ACTIVE | Noted: 2021-01-11

## 2022-10-15 PROBLEM — J96.21 ACUTE ON CHRONIC RESPIRATORY FAILURE WITH HYPOXIA (HCC): Status: ACTIVE | Noted: 2022-10-15

## 2022-10-15 PROBLEM — R10.84 GENERALIZED ABDOMINAL PAIN: Status: RESOLVED | Noted: 2021-01-11 | Resolved: 2022-10-15

## 2022-10-15 PROBLEM — R10.9 RIGHT FLANK PAIN: Status: RESOLVED | Noted: 2022-01-20 | Resolved: 2022-10-15

## 2022-10-15 PROBLEM — R07.81 RIB PAIN: Status: RESOLVED | Noted: 2022-09-08 | Resolved: 2022-10-15

## 2022-10-15 LAB
2HR DELTA HS TROPONIN: -2 NG/L
4HR DELTA HS TROPONIN: -2 NG/L
ANION GAP SERPL CALCULATED.3IONS-SCNC: 5 MMOL/L (ref 4–13)
APTT PPP: 26 SECONDS (ref 23–37)
BASOPHILS # BLD AUTO: 0.04 THOUSANDS/ÂΜL (ref 0–0.1)
BASOPHILS NFR BLD AUTO: 0 % (ref 0–1)
BNP SERPL-MCNC: 46 PG/ML (ref 0–100)
BUN SERPL-MCNC: 20 MG/DL (ref 5–25)
CALCIUM SERPL-MCNC: 9.4 MG/DL (ref 8.4–10.2)
CARDIAC TROPONIN I PNL SERPL HS: 11 NG/L
CARDIAC TROPONIN I PNL SERPL HS: 9 NG/L
CARDIAC TROPONIN I PNL SERPL HS: 9 NG/L
CHLORIDE SERPL-SCNC: 100 MMOL/L (ref 96–108)
CO2 SERPL-SCNC: 37 MMOL/L (ref 21–32)
CREAT SERPL-MCNC: 1.26 MG/DL (ref 0.6–1.3)
EOSINOPHIL # BLD AUTO: 0.1 THOUSAND/ÂΜL (ref 0–0.61)
EOSINOPHIL NFR BLD AUTO: 1 % (ref 0–6)
ERYTHROCYTE [DISTWIDTH] IN BLOOD BY AUTOMATED COUNT: 14.3 % (ref 11.6–15.1)
FLUAV RNA RESP QL NAA+PROBE: NEGATIVE
FLUBV RNA RESP QL NAA+PROBE: NEGATIVE
GFR SERPL CREATININE-BSD FRML MDRD: 54 ML/MIN/1.73SQ M
GLUCOSE SERPL-MCNC: 67 MG/DL (ref 65–140)
HCT VFR BLD AUTO: 57.3 % (ref 36.5–49.3)
HGB BLD-MCNC: 17.8 G/DL (ref 12–17)
IMM GRANULOCYTES # BLD AUTO: 0.16 THOUSAND/UL (ref 0–0.2)
IMM GRANULOCYTES NFR BLD AUTO: 2 % (ref 0–2)
INR PPP: 1.02 (ref 0.84–1.19)
LYMPHOCYTES # BLD AUTO: 0.85 THOUSANDS/ÂΜL (ref 0.6–4.47)
LYMPHOCYTES NFR BLD AUTO: 8 % (ref 14–44)
MCH RBC QN AUTO: 31.7 PG (ref 26.8–34.3)
MCHC RBC AUTO-ENTMCNC: 31.1 G/DL (ref 31.4–37.4)
MCV RBC AUTO: 102 FL (ref 82–98)
MONOCYTES # BLD AUTO: 1.36 THOUSAND/ÂΜL (ref 0.17–1.22)
MONOCYTES NFR BLD AUTO: 12 % (ref 4–12)
NEUTROPHILS # BLD AUTO: 8.48 THOUSANDS/ÂΜL (ref 1.85–7.62)
NEUTS SEG NFR BLD AUTO: 77 % (ref 43–75)
NRBC BLD AUTO-RTO: 0 /100 WBCS
PLATELET # BLD AUTO: 164 THOUSANDS/UL (ref 149–390)
PMV BLD AUTO: 10.5 FL (ref 8.9–12.7)
POTASSIUM SERPL-SCNC: 3.7 MMOL/L (ref 3.5–5.3)
PROCALCITONIN SERPL-MCNC: 0.11 NG/ML
PROTHROMBIN TIME: 13.4 SECONDS (ref 11.6–14.5)
RBC # BLD AUTO: 5.61 MILLION/UL (ref 3.88–5.62)
RSV RNA RESP QL NAA+PROBE: NEGATIVE
SARS-COV-2 RNA RESP QL NAA+PROBE: NEGATIVE
SODIUM SERPL-SCNC: 142 MMOL/L (ref 135–147)
WBC # BLD AUTO: 10.99 THOUSAND/UL (ref 4.31–10.16)

## 2022-10-15 PROCEDURE — 99285 EMERGENCY DEPT VISIT HI MDM: CPT | Performed by: PHYSICIAN ASSISTANT

## 2022-10-15 PROCEDURE — 94760 N-INVAS EAR/PLS OXIMETRY 1: CPT

## 2022-10-15 PROCEDURE — 94644 CONT INHLJ TX 1ST HOUR: CPT

## 2022-10-15 PROCEDURE — 83880 ASSAY OF NATRIURETIC PEPTIDE: CPT | Performed by: PHYSICIAN ASSISTANT

## 2022-10-15 PROCEDURE — 71045 X-RAY EXAM CHEST 1 VIEW: CPT

## 2022-10-15 PROCEDURE — 96375 TX/PRO/DX INJ NEW DRUG ADDON: CPT

## 2022-10-15 PROCEDURE — 94002 VENT MGMT INPAT INIT DAY: CPT

## 2022-10-15 PROCEDURE — 84484 ASSAY OF TROPONIN QUANT: CPT | Performed by: PHYSICIAN ASSISTANT

## 2022-10-15 PROCEDURE — 80048 BASIC METABOLIC PNL TOTAL CA: CPT | Performed by: PHYSICIAN ASSISTANT

## 2022-10-15 PROCEDURE — 99223 1ST HOSP IP/OBS HIGH 75: CPT | Performed by: PHYSICIAN ASSISTANT

## 2022-10-15 PROCEDURE — 93005 ELECTROCARDIOGRAM TRACING: CPT

## 2022-10-15 PROCEDURE — 96365 THER/PROPH/DIAG IV INF INIT: CPT

## 2022-10-15 PROCEDURE — 36415 COLL VENOUS BLD VENIPUNCTURE: CPT | Performed by: PHYSICIAN ASSISTANT

## 2022-10-15 PROCEDURE — 99285 EMERGENCY DEPT VISIT HI MDM: CPT

## 2022-10-15 PROCEDURE — 85025 COMPLETE CBC W/AUTO DIFF WBC: CPT | Performed by: PHYSICIAN ASSISTANT

## 2022-10-15 PROCEDURE — 85610 PROTHROMBIN TIME: CPT | Performed by: PHYSICIAN ASSISTANT

## 2022-10-15 PROCEDURE — 0241U HB NFCT DS VIR RESP RNA 4 TRGT: CPT | Performed by: PHYSICIAN ASSISTANT

## 2022-10-15 PROCEDURE — 85730 THROMBOPLASTIN TIME PARTIAL: CPT | Performed by: PHYSICIAN ASSISTANT

## 2022-10-15 PROCEDURE — 84145 PROCALCITONIN (PCT): CPT | Performed by: PHYSICIAN ASSISTANT

## 2022-10-15 RX ORDER — ALBUTEROL SULFATE 2.5 MG/3ML
2.5 SOLUTION RESPIRATORY (INHALATION) EVERY 6 HOURS PRN
Status: DISCONTINUED | OUTPATIENT
Start: 2022-10-15 | End: 2022-10-16 | Stop reason: HOSPADM

## 2022-10-15 RX ORDER — ENOXAPARIN SODIUM 100 MG/ML
40 INJECTION SUBCUTANEOUS DAILY
Status: DISCONTINUED | OUTPATIENT
Start: 2022-10-16 | End: 2022-10-16 | Stop reason: HOSPADM

## 2022-10-15 RX ORDER — CEFTRIAXONE 1 G/50ML
1000 INJECTION, SOLUTION INTRAVENOUS ONCE
Status: COMPLETED | OUTPATIENT
Start: 2022-10-15 | End: 2022-10-15

## 2022-10-15 RX ORDER — GABAPENTIN 300 MG/1
600 CAPSULE ORAL 2 TIMES DAILY
Status: DISCONTINUED | OUTPATIENT
Start: 2022-10-16 | End: 2022-10-16 | Stop reason: HOSPADM

## 2022-10-15 RX ORDER — METOPROLOL SUCCINATE 50 MG/1
75 TABLET, EXTENDED RELEASE ORAL DAILY
Status: DISCONTINUED | OUTPATIENT
Start: 2022-10-16 | End: 2022-10-16 | Stop reason: HOSPADM

## 2022-10-15 RX ORDER — AZITHROMYCIN 250 MG/1
500 TABLET, FILM COATED ORAL ONCE
Status: COMPLETED | OUTPATIENT
Start: 2022-10-15 | End: 2022-10-15

## 2022-10-15 RX ORDER — ACETAMINOPHEN 325 MG/1
650 TABLET ORAL EVERY 6 HOURS PRN
Status: DISCONTINUED | OUTPATIENT
Start: 2022-10-15 | End: 2022-10-16 | Stop reason: HOSPADM

## 2022-10-15 RX ORDER — FAMOTIDINE 20 MG/1
20 TABLET, FILM COATED ORAL
Status: DISCONTINUED | OUTPATIENT
Start: 2022-10-15 | End: 2022-10-16 | Stop reason: HOSPADM

## 2022-10-15 RX ORDER — METOPROLOL SUCCINATE 50 MG/1
50 TABLET, EXTENDED RELEASE ORAL
Status: DISCONTINUED | OUTPATIENT
Start: 2022-10-15 | End: 2022-10-16 | Stop reason: HOSPADM

## 2022-10-15 RX ORDER — SODIUM CHLORIDE FOR INHALATION 0.9 %
3 VIAL, NEBULIZER (ML) INHALATION ONCE
Status: COMPLETED | OUTPATIENT
Start: 2022-10-15 | End: 2022-10-15

## 2022-10-15 RX ORDER — FUROSEMIDE 40 MG/1
40 TABLET ORAL DAILY
Status: DISCONTINUED | OUTPATIENT
Start: 2022-10-16 | End: 2022-10-16 | Stop reason: HOSPADM

## 2022-10-15 RX ORDER — METHYLPREDNISOLONE SODIUM SUCCINATE 125 MG/2ML
80 INJECTION, POWDER, LYOPHILIZED, FOR SOLUTION INTRAMUSCULAR; INTRAVENOUS ONCE
Status: COMPLETED | OUTPATIENT
Start: 2022-10-15 | End: 2022-10-15

## 2022-10-15 RX ORDER — DOXYCYCLINE HYCLATE 100 MG/1
100 CAPSULE ORAL EVERY 12 HOURS
Status: DISCONTINUED | OUTPATIENT
Start: 2022-10-15 | End: 2022-10-16 | Stop reason: HOSPADM

## 2022-10-15 RX ORDER — NITROGLYCERIN 0.4 MG/1
0.4 TABLET SUBLINGUAL
Status: DISCONTINUED | OUTPATIENT
Start: 2022-10-15 | End: 2022-10-16 | Stop reason: HOSPADM

## 2022-10-15 RX ORDER — ATORVASTATIN CALCIUM 40 MG/1
80 TABLET, FILM COATED ORAL
Status: DISCONTINUED | OUTPATIENT
Start: 2022-10-15 | End: 2022-10-16 | Stop reason: HOSPADM

## 2022-10-15 RX ORDER — GABAPENTIN 300 MG/1
300 CAPSULE ORAL
Status: DISCONTINUED | OUTPATIENT
Start: 2022-10-15 | End: 2022-10-16 | Stop reason: HOSPADM

## 2022-10-15 RX ORDER — ASPIRIN 81 MG/1
81 TABLET, CHEWABLE ORAL
Status: DISCONTINUED | OUTPATIENT
Start: 2022-10-15 | End: 2022-10-16 | Stop reason: HOSPADM

## 2022-10-15 RX ORDER — METHYLPREDNISOLONE SODIUM SUCCINATE 40 MG/ML
40 INJECTION, POWDER, LYOPHILIZED, FOR SOLUTION INTRAMUSCULAR; INTRAVENOUS EVERY 8 HOURS
Status: DISCONTINUED | OUTPATIENT
Start: 2022-10-16 | End: 2022-10-16 | Stop reason: HOSPADM

## 2022-10-15 RX ORDER — POTASSIUM CHLORIDE 750 MG/1
10 TABLET, EXTENDED RELEASE ORAL 2 TIMES DAILY WITH MEALS
Status: DISCONTINUED | OUTPATIENT
Start: 2022-10-15 | End: 2022-10-16 | Stop reason: HOSPADM

## 2022-10-15 RX ADMIN — ASPIRIN 81 MG CHEWABLE TABLET 81 MG: 81 TABLET CHEWABLE at 22:49

## 2022-10-15 RX ADMIN — ISODIUM CHLORIDE 3 ML: 0.03 SOLUTION RESPIRATORY (INHALATION) at 17:21

## 2022-10-15 RX ADMIN — METOPROLOL SUCCINATE 50 MG: 50 TABLET, EXTENDED RELEASE ORAL at 22:51

## 2022-10-15 RX ADMIN — AZITHROMYCIN MONOHYDRATE 500 MG: 250 TABLET ORAL at 18:25

## 2022-10-15 RX ADMIN — POTASSIUM CHLORIDE 10 MEQ: 750 TABLET, EXTENDED RELEASE ORAL at 22:58

## 2022-10-15 RX ADMIN — METHYLPREDNISOLONE SODIUM SUCCINATE 80 MG: 125 INJECTION, POWDER, FOR SOLUTION INTRAMUSCULAR; INTRAVENOUS at 17:41

## 2022-10-15 RX ADMIN — ATORVASTATIN CALCIUM 80 MG: 40 TABLET, FILM COATED ORAL at 22:53

## 2022-10-15 RX ADMIN — IPRATROPIUM BROMIDE 1 MG: 0.5 SOLUTION RESPIRATORY (INHALATION) at 17:21

## 2022-10-15 RX ADMIN — DOXYCYCLINE 100 MG: 100 CAPSULE ORAL at 21:45

## 2022-10-15 RX ADMIN — CEFTRIAXONE 1000 MG: 1 INJECTION, SOLUTION INTRAVENOUS at 18:25

## 2022-10-15 RX ADMIN — GABAPENTIN 300 MG: 300 CAPSULE ORAL at 22:54

## 2022-10-15 RX ADMIN — ALBUTEROL SULFATE 10 MG: 2.5 SOLUTION RESPIRATORY (INHALATION) at 17:20

## 2022-10-15 RX ADMIN — FAMOTIDINE 20 MG: 20 TABLET, FILM COATED ORAL at 22:56

## 2022-10-15 NOTE — ED PROVIDER NOTES
History  Chief Complaint   Patient presents with   • Shortness of Breath     Shortness of breath, cough, wheezing     65-year-old male history of hypertension, hyperlipidemia, prior MI and COPD presents accompanied by his wife and daughter complaining of shortness of breath  Patient reports that since yesterday he has had a minimally productive cough, wheezing and some pain in his chest that is only present when coughing  He denies any pleuritic pain, dizziness  Denies any significant orthopnea stating that his shortness of breath is constant  He uses a CPAP and 2 L of oxygen at night and has oxygen to use when needed  Patient is 87% in triage on his 2 L  patient denies any recent lower leg pain or swelling or any personal or family history DVT/PE  Prior to Admission Medications   Prescriptions Last Dose Informant Patient Reported? Taking? Blood Pressure Monitor KIT   No No   Sig: Use daily   Patient taking differently: Use 40 mg daily   Multiple Vitamin (multivitamin) tablet   Yes No   Sig: Take 1 tablet by mouth daily   Tiotropium Bromide Monohydrate (SPIRIVA RESPIMAT IN)  Self Yes No   Sig: Inhale every morning   albuterol (2 5 mg/3 mL) 0 083 % nebulizer solution 10/15/2022 at Unknown time  No Yes   Sig: Take 3 mL (2 5 mg total) by nebulization every 6 (six) hours as needed for wheezing or shortness of breath   aspirin 81 mg chewable tablet 10/14/2022 at Unknown time Self Yes Yes   Sig: CHEW ONE TABLET BY MOUTH EVERY DAY   azithromycin (ZITHROMAX) 250 mg tablet 10/14/2022 at Unknown time  No Yes   Sig: Take 1 every Monday, Wednesday and Friday  famotidine (PEPCID) 20 mg tablet 10/14/2022 at Unknown time Self Yes Yes   Sig: Take 20 mg by mouth daily at bedtime   fluticasone (FLONASE) 50 mcg/act nasal spray  Self Yes No   Sig: into each nostril as needed    fluticasone-salmeterol (Advair) 500-50 mcg/dose inhaler  Self Yes No   Sig: Inhale 1 puff in the morning Rinse mouth after use     furosemide (LASIX) 40 mg tablet 10/15/2022 at Unknown time Self No Yes   Sig: Take 1 tablet (40 mg total) by mouth daily   gabapentin (NEURONTIN) 300 mg capsule 10/15/2022 at Unknown time  No Yes   Sig: Take 1 capsule (300 mg total) by mouth 3 (three) times a day   metoprolol succinate (TOPROL-XL) 25 mg 24 hr tablet 10/15/2022 at Unknown time  No Yes   Sig: Take 1 tablet (25 mg total) by mouth daily   Patient taking differently: Take 75 mg by mouth daily in the early morning   metoprolol succinate (TOPROL-XL) 50 mg 24 hr tablet 10/14/2022 at Unknown time  No Yes   Sig: Take 1 tablet (50 mg total) by mouth 2 (two) times a day   Patient taking differently: Take 50 mg by mouth daily at bedtime   nitroglycerin (NITROSTAT) 0 4 mg SL tablet  Self Yes No   Sig: DISSOLVE ONE TABLET UNDER THE TONGUE  PRN   potassium chloride (Klor-Con) 10 mEq tablet 10/14/2022 at Unknown time Self No Yes   Sig: Take 2 tablets (20 mEq total) by mouth daily   rosuvastatin (CRESTOR) 40 MG tablet 10/14/2022 at Unknown time  No Yes   Sig: Take 1 tablet (40 mg total) by mouth daily      Facility-Administered Medications: None       Past Medical History:   Diagnosis Date   • Bilateral carotid artery stenosis    • Chronic diastolic heart failure (HCC)    • Chronic ischemic heart disease    • Colon polyp    • COPD (chronic obstructive pulmonary disease) ([facility])    • Coronary artery disease     hx stents, MI, PCI   • CPAP (continuous positive airway pressure) dependence    • Hearing loss    • Hyperlipidemia    • Hypertension    • MI (myocardial infarction) ([facility] [address] ) 1995   • Myocardial infarction ([facility] [address] )    • Pneumonia    • Prostate cancer ([facility] [address] )    • S/P carotid endarterectomy    • Shortness of breath    • Sleep apnea    • Sleep apnea, obstructive    • Stented coronary artery        Past Surgical History:   Procedure Laterality Date   • APPENDECTOMY     • CARDIAC CATHETERIZATION  03/18/2021    left main with no significant disease, proximal LAD with 10% stenosis at the site of prior stent, left circumflex artery with minimal luminal irregularities mid RCA with 50% stenosis at site of prior stent and PL segment with distal disease supplied by collaterals from the distal circumflex with no significant CAD requiring revascularization at that time  • CATARACT EXTRACTION, BILATERAL Bilateral    • COLONOSCOPY     • CORONARY ANGIOPLASTY WITH STENT PLACEMENT      RCA   • CORONARY ANGIOPLASTY WITH STENT PLACEMENT      RCA   • CORONARY ANGIOPLASTY WITH STENT PLACEMENT      LAD   • EYE SURGERY     • SC BIOPSY OF PROSTATE,NEEDLE,TRANSPERINEAL N/A 2022    Procedure: TRANSPERINEAL MRI FUSION  BIOPSY PROSTATE;  Surgeon: Karen Vergara MD;  Location: [facility];  Service: Urology   • SC REVISE MEDIAN N/CARPAL TUNNEL SURG Left 2022    Procedure: RELEASE CARPAL TUNNEL;  Surgeon: Lottie Hdez MD;  Location: [facility];  Service: Orthopedics   • SC REVISE ULNAR NERVE AT ELBOW Left 2022    Procedure: Brenda Castaneda;  Surgeon: Lottie Hdez MD;  Location[facility];  Service: Orthopedics   • SC REVISE ULNAR NERVE AT WRIST Left 2022    Procedure: Nirali Morales;  Surgeon: Lottie Hdez MD;  Location: [facility];  Service: Orthopedics   • SKIN CANCER EXCISION      chin-per pt, basal cell  also right ankle       Family History   Problem Relation Age of Onset   • Heart attack Mother    • Dementia Mother    • No Known Problems Father      I have reviewed and agree with the history as documented      E-Cigarette/Vaping   • E-Cigarette Use Never User      E-Cigarette/Vaping Substances   • Nicotine No    • THC No    • CBD No    • Flavoring No    • Other No    • Unknown No      Social History     Tobacco Use   • Smoking status: Former Smoker     Packs/day: 2 00     Years: 35 00     Pack years: 70 00     Quit date: 1995     Years since quittin 2   • Smokeless tobacco: Never Used   Vaping Use   • Vaping Use: Never used   Substance Use Topics   • Alcohol use: Not Currently   • Drug use: Never       Review of Systems   Constitutional: Negative for chills, fatigue and fever  HENT: Negative for congestion and sore throat  Eyes: Negative for pain  Respiratory: Positive for cough, shortness of breath and wheezing  Negative for chest tightness  Cardiovascular: Negative for chest pain, palpitations and leg swelling  Gastrointestinal: Negative for abdominal pain, constipation, diarrhea, nausea and vomiting  Endocrine: Negative for polyuria  Genitourinary: Negative for dysuria  Musculoskeletal: Negative for arthralgias, back pain, myalgias and neck pain  Skin: Negative for rash  Neurological: Negative for dizziness, syncope, light-headedness and headaches  All other systems reviewed and are negative  Physical Exam  Physical Exam  Vitals reviewed  Constitutional:       General: He is not in acute distress  Appearance: Normal appearance  He is well-developed  He is not toxic-appearing  HENT:      Head: Normocephalic and atraumatic  Mouth/Throat:      Mouth: Mucous membranes are moist    Eyes:      Conjunctiva/sclera: Conjunctivae normal    Cardiovascular:      Rate and Rhythm: Normal rate and regular rhythm  Heart sounds: Normal heart sounds  Pulmonary:      Effort: Pulmonary effort is normal       Breath sounds: Wheezing and rhonchi present  Abdominal:      General: Bowel sounds are normal       Palpations: Abdomen is soft  Tenderness: There is no abdominal tenderness  Musculoskeletal:         General: Normal range of motion  Cervical back: Normal range of motion  Skin:     General: Skin is warm and dry  Capillary Refill: Capillary refill takes less than 2 seconds  Neurological:      General: No focal deficit present  Mental Status: He is alert and oriented to person, place, and time     Psychiatric:         Mood and Affect: Mood normal          Behavior: Behavior normal  Vital Signs  ED Triage Vitals [10/15/22 1656]   Temperature Pulse Respirations Blood Pressure SpO2   (!) 97 2 °F (36 2 °C) 85 22 129/62 (!) 88 %      Temp Source Heart Rate Source Patient Position - Orthostatic VS BP Location FiO2 (%)   Tympanic Monitor Sitting Right arm --      Pain Score       7           Vitals:    10/15/22 1825 10/15/22 1930 10/15/22 2100 10/15/22 2156   BP: 143/66 166/67  166/67   Pulse: 87 90 86 85   Patient Position - Orthostatic VS:  Sitting  Sitting         Visual Acuity      ED Medications  Medications   acetaminophen (TYLENOL) tablet 650 mg (has no administration in time range)   methylPREDNISolone sodium succinate (Solu-MEDROL) injection 40 mg (has no administration in time range)   enoxaparin (LOVENOX) subcutaneous injection 40 mg (has no administration in time range)   doxycycline hyclate (VIBRAMYCIN) capsule 100 mg (100 mg Oral Given 10/15/22 2145)   albuterol inhalation solution 2 5 mg (has no administration in time range)   aspirin chewable tablet 81 mg (has no administration in time range)   famotidine (PEPCID) tablet 20 mg (has no administration in time range)   furosemide (LASIX) tablet 40 mg (has no administration in time range)   gabapentin (NEURONTIN) capsule 600 mg (has no administration in time range)   gabapentin (NEURONTIN) capsule 300 mg (has no administration in time range)   metoprolol succinate (TOPROL-XL) 24 hr tablet 75 mg (has no administration in time range)   metoprolol succinate (TOPROL-XL) 24 hr tablet 50 mg (has no administration in time range)   multivitamin stress formula tablet 1 tablet (has no administration in time range)   nitroglycerin (NITROSTAT) SL tablet 0 4 mg (has no administration in time range)   potassium chloride (K-DUR,KLOR-CON) CR tablet 10 mEq (has no administration in time range)   atorvastatin (LIPITOR) tablet 80 mg (has no administration in time range)   tiotropium (SPIRIVA) capsule for inhaler 18 mcg (has no administration in time range)   methylPREDNISolone sodium succinate (Solu-MEDROL) injection 80 mg (80 mg Intravenous Given 10/15/22 1741)   albuterol inhalation solution 10 mg (10 mg Nebulization Given 10/15/22 1720)     And   ipratropium (ATROVENT) 0 02 % inhalation solution 1 mg (1 mg Nebulization Given 10/15/22 1721)     And   sodium chloride 0 9 % inhalation solution 3 mL (3 mL Nebulization Given 10/15/22 1721)   cefTRIAXone (ROCEPHIN) IVPB (premix in dextrose) 1,000 mg 50 mL (0 mg Intravenous Stopped 10/15/22 1900)   azithromycin (ZITHROMAX) tablet 500 mg (500 mg Oral Given 10/15/22 1825)       Diagnostic Studies  Results Reviewed     Procedure Component Value Units Date/Time    HS Troponin I 4hr [[id number]] Collected: 10/15/22 2146    Lab Status: In process Specimen: Blood from Arm, Right Updated: 10/15/22 2214    Procalcitonin [[id number]]  (Normal) Collected: 10/15/22 1727    Lab Status: Final result Specimen: Blood from Arm, Left Updated: 10/15/22 2204     Procalcitonin 0 11 ng/ml     HS Troponin I 2hr [[id number]]  (Normal) Collected: 10/15/22 2010    Lab Status: Final result Specimen: Blood from Arm, Right Updated: 10/15/22 2041     hs TnI 2hr 9 ng/L      Delta 2hr hsTnI -2 ng/L     FLU/RSV/COVID - if FLU/RSV clinically relevant [[id number]]  (Normal) Collected: 10/15/22 1727    Lab Status: Final result Specimen: Nasopharyngeal Swab Updated: 10/15/22 1810     SARS-CoV-2 Negative     INFLUENZA A PCR Negative     INFLUENZA B PCR Negative     RSV PCR Negative    Narrative:      FOR PEDIATRIC PATIENTS - copy/paste COVID Guidelines URL to browser: [email or web address]    SARS-CoV-2 assay is a Nucleic Acid Amplification assay intended for the  qualitative detection of nucleic acid from SARS-CoV-2 in nasopharyngeal  swabs  Results are for the presumptive identification of SARS-CoV-2 RNA      Positive results are indicative of infection with SARS-CoV-2, the virus  causing COVID-19, but do not rule out bacterial infection or co-infection  with other viruses  Laboratories within the [facility] and its  territories are required to report all positive results to the appropriate  public health authorities  Negative results do not preclude SARS-CoV-2  infection and should not be used as the sole basis for treatment or other  patient management decisions  Negative results must be combined with  clinical observations, patient history, and epidemiological information  This test has not been FDA cleared or approved  This test has been authorized by FDA under an Emergency Use Authorization  (EUA)  This test is only authorized for the duration of time the  declaration that circumstances exist justifying the authorization of the  emergency use of an in vitro diagnostic tests for detection of SARS-CoV-2  virus and/or diagnosis of COVID-19 infection under section 564(b)(1) of  the Act, 21 U  S C  495GWO-7(X)(6), unless the authorization is terminated  or revoked sooner  The test has been validated but independent review by FDA  and CLIA is pending  Test performed using [employer] GeneXpert: This RT-PCR assay targets N2,  a region unique to SARS-CoV-2  A conserved region in the E-gene was chosen  for pan-Sarbecovirus detection which includes SARS-CoV-2  According to CMS-2020-01-R, this platform meets the definition of high-throughput technology      HS Troponin 0hr (reflex protocol) [[id number]]  (Normal) Collected: 10/15/22 1727    Lab Status: Final result Specimen: Blood from Arm, Left Updated: 10/15/22 1759     hs TnI 0hr 11 ng/L     B-Type Natriuretic Peptide(BNP) AN, CA, EA Campuses Only [[id number]]  (Normal) Collected: 10/15/22 1727    Lab Status: Final result Specimen: Blood from Arm, Left Updated: 10/15/22 1757     BNP 46 pg/mL     CBC and differential [[id number]]  (Abnormal) Collected: 10/15/22 1727    Lab Status: Final result Specimen: Blood from Arm, Left Updated: 10/15/22 1751     WBC 10 99 Thousand/uL      RBC 5 61 Million/uL      Hemoglobin 17 8 g/dL      Hematocrit 57 3 %       fL      MCH 31 7 pg      MCHC 31 1 g/dL      RDW 14 3 %      MPV 10 5 fL      Platelets 321 Thousands/uL      nRBC 0 /100 WBCs      Neutrophils Relative 77 %      Immat GRANS % 2 %      Lymphocytes Relative 8 %      Monocytes Relative 12 %      Eosinophils Relative 1 %      Basophils Relative 0 %      Neutrophils Absolute 8 48 Thousands/µL      Immature Grans Absolute 0 16 Thousand/uL      Lymphocytes Absolute 0 85 Thousands/µL      Monocytes Absolute 1 36 Thousand/µL      Eosinophils Absolute 0 10 Thousand/µL      Basophils Absolute 0 04 Thousands/µL     Basic metabolic panel [[id number]]  (Abnormal) Collected: 10/15/22 1727    Lab Status: Final result Specimen: Blood from Arm, Left Updated: 10/15/22 1751     Sodium 142 mmol/L      Potassium 3 7 mmol/L      Chloride 100 mmol/L      CO2 37 mmol/L      ANION GAP 5 mmol/L      BUN 20 mg/dL      Creatinine 1 26 mg/dL      Glucose 67 mg/dL      Calcium 9 4 mg/dL      eGFR 54 ml/min/1 73sq m     Narrative:      [place] guidelines for Chronic Kidney Disease (CKD):   •  Stage 1 with normal or high GFR (GFR > 90 mL/min/1 73 square meters)  •  Stage 2 Mild CKD (GFR = 60-89 mL/min/1 73 square meters)  •  Stage 3A Moderate CKD (GFR = 45-59 mL/min/1 73 square meters)  •  Stage 3B Moderate CKD (GFR = 30-44 mL/min/1 73 square meters)  •  Stage 4 Severe CKD (GFR = 15-29 mL/min/1 73 square meters)  •  Stage 5 End Stage CKD (GFR <15 mL/min/1 73 square meters)  Note: GFR calculation is accurate only with a steady state creatinine    Protime-INR [[id number]]  (Normal) Collected: 10/15/22 1727    Lab Status: Final result Specimen: Blood from Arm, Left Updated: 10/15/22 1747     Protime 13 4 seconds      INR 1 02    APTT [[id number]]  (Normal) Collected: 10/15/22 1727    Lab Status: Final result Specimen: Blood from Arm, Left Updated: 10/15/22 1747     PTT 26 seconds                  XR chest 1 view portable   ED Interpretation by Sohail Manley PA-C (10/15 1739)   No obvious acute cardiopulmonary disease                 Procedures  ECG 12 Lead Documentation Only    Date/Time: 10/15/2022 10:30 PM  Performed by: Sohail Manley PA-C  Authorized by: Sohail Manley PA-C     ECG reviewed by me, the ED Provider: yes    Patient location:  ED  Previous ECG:     Previous ECG:  Compared to current    Similarity:  No change    Comparison to cardiac monitor: Yes    Interpretation:     Interpretation: normal    Rate:     ECG rate:  81    ECG rate assessment: normal    Rhythm:     Rhythm: sinus rhythm    Ectopy:     Ectopy: none    QRS:     QRS axis:  Normal  Conduction:     Conduction: normal    ST segments:     ST segments:  Non-specific  T waves:     T waves: normal    Comments:      No evidence of acute cardiac ischemia             ED Course                               SBIRT 20yo+    Flowsheet Row Most Recent Value   SBIRT (23 yo +)    In order to provide better care to our patients, we are screening all of our patients for alcohol and drug use  Would it be okay to ask you these screening questions? Yes Filed at: 10/15/2022 1930   Initial Alcohol Screen: US AUDIT-C     1  How often do you have a drink containing alcohol? 0 Filed at: 10/15/2022 1930   2  How many drinks containing alcohol do you have on a typical day you are drinking? 0 Filed at: 10/15/2022 1930   3a  Male UNDER 65: How often do you have five or more drinks on one occasion? 0 Filed at: 10/15/2022 1930   3b  FEMALE Any Age, or MALE 65+: How often do you have 4 or more drinks on one occassion? 0 Filed at: 10/15/2022 1930   Audit-C Score 0 Filed at: 10/15/2022 1930   CRISTOPHER: How many times in the past year have you    Used an illegal drug or used a prescription medication for non-medical reasons?  Never Filed at: 10/15/2022 1930                    MDM  Number of Diagnoses or Management Options  COPD exacerbation ([facility] [address] )  Diagnosis management comments: Patient presented hypoxic despite his 3 L nasal cannula O2  Significant wheezing and rhonchi on exam despite nebulizer treatments at home  Patient received heart neb in the ED  Ambulated patient on his 3 L nasal cannula O2 and he dropped to 80% shortly after  Patient afebrile  Only mild leukocytosis however given patient's history of pneumonia and chest x-ray poor sensitivity for pneumonia, will cover with ceftriaxone and azithromycin initially and continue to monitor  Will admit patient to medicine  Patient agreeable to plan  Disposition  Final diagnoses:   COPD exacerbation ([facility] [address] )     Time reflects when diagnosis was documented in both MDM as applicable and the Disposition within this note     Time User Action Codes Description Comment    10/15/2022  9:14 PM Sonya Gutierrez Add [J44 1] COPD exacerbation [facility])       ED Disposition     ED Disposition   Admit    Condition   Stable    Date/Time   Sat Oct 15, 2022  9:20 PM    Comment   Discussed with Davion Jameson, will admit to Dr Kaelyn Kruse  Follow-up Information    None         Patient's Medications   Discharge Prescriptions    No medications on file       No discharge procedures on file      PDMP Review       Value Time User    PDMP Reviewed  Yes 7/22/2022  8:46 AM Archie Mccarty PA-C          ED Provider  Electronically Signed by           Ana Laura Bowman PA-C  10/15/22 5893

## 2022-10-16 VITALS
BODY MASS INDEX: 32.89 KG/M2 | SYSTOLIC BLOOD PRESSURE: 106 MMHG | HEIGHT: 68 IN | TEMPERATURE: 97.2 F | HEART RATE: 75 BPM | OXYGEN SATURATION: 91 % | WEIGHT: 217 LBS | DIASTOLIC BLOOD PRESSURE: 55 MMHG | RESPIRATION RATE: 18 BRPM

## 2022-10-16 LAB — PROCALCITONIN SERPL-MCNC: 0.1 NG/ML

## 2022-10-16 PROCEDURE — 94664 DEMO&/EVAL PT USE INHALER: CPT

## 2022-10-16 PROCEDURE — 94640 AIRWAY INHALATION TREATMENT: CPT

## 2022-10-16 PROCEDURE — 99239 HOSP IP/OBS DSCHRG MGMT >30: CPT | Performed by: INTERNAL MEDICINE

## 2022-10-16 PROCEDURE — 84145 PROCALCITONIN (PCT): CPT | Performed by: PHYSICIAN ASSISTANT

## 2022-10-16 PROCEDURE — 94760 N-INVAS EAR/PLS OXIMETRY 1: CPT

## 2022-10-16 PROCEDURE — 36415 COLL VENOUS BLD VENIPUNCTURE: CPT | Performed by: PHYSICIAN ASSISTANT

## 2022-10-16 RX ORDER — BUDESONIDE AND FORMOTEROL FUMARATE DIHYDRATE 80; 4.5 UG/1; UG/1
2 AEROSOL RESPIRATORY (INHALATION) 2 TIMES DAILY
Status: DISCONTINUED | OUTPATIENT
Start: 2022-10-16 | End: 2022-10-16 | Stop reason: HOSPADM

## 2022-10-16 RX ORDER — DOXYCYCLINE HYCLATE 100 MG/1
100 CAPSULE ORAL EVERY 12 HOURS SCHEDULED
Qty: 9 CAPSULE | Refills: 0 | Status: SHIPPED | OUTPATIENT
Start: 2022-10-16 | End: 2022-10-25

## 2022-10-16 RX ORDER — SODIUM CHLORIDE FOR INHALATION 0.9 %
3 VIAL, NEBULIZER (ML) INHALATION
Status: DISCONTINUED | OUTPATIENT
Start: 2022-10-16 | End: 2022-10-16 | Stop reason: HOSPADM

## 2022-10-16 RX ORDER — PREDNISONE 20 MG/1
40 TABLET ORAL DAILY
Qty: 10 TABLET | Refills: 0 | Status: SHIPPED | OUTPATIENT
Start: 2022-10-16 | End: 2022-10-25

## 2022-10-16 RX ORDER — LEVALBUTEROL 1.25 MG/.5ML
1.25 SOLUTION, CONCENTRATE RESPIRATORY (INHALATION)
Status: DISCONTINUED | OUTPATIENT
Start: 2022-10-16 | End: 2022-10-16 | Stop reason: HOSPADM

## 2022-10-16 RX ADMIN — B-COMPLEX W/ C & FOLIC ACID TAB 1 TABLET: TAB at 08:29

## 2022-10-16 RX ADMIN — ENOXAPARIN SODIUM 40 MG: 100 INJECTION SUBCUTANEOUS at 08:31

## 2022-10-16 RX ADMIN — METHYLPREDNISOLONE SODIUM SUCCINATE 40 MG: 40 INJECTION, POWDER, FOR SOLUTION INTRAMUSCULAR; INTRAVENOUS at 05:29

## 2022-10-16 RX ADMIN — GABAPENTIN 600 MG: 300 CAPSULE ORAL at 08:29

## 2022-10-16 RX ADMIN — ISODIUM CHLORIDE 3 ML: 0.03 SOLUTION RESPIRATORY (INHALATION) at 07:54

## 2022-10-16 RX ADMIN — POTASSIUM CHLORIDE 10 MEQ: 750 TABLET, EXTENDED RELEASE ORAL at 08:29

## 2022-10-16 RX ADMIN — DOXYCYCLINE 100 MG: 100 CAPSULE ORAL at 09:44

## 2022-10-16 RX ADMIN — LEVALBUTEROL HYDROCHLORIDE 1.25 MG: 1.25 SOLUTION, CONCENTRATE RESPIRATORY (INHALATION) at 07:54

## 2022-10-16 RX ADMIN — TIOTROPIUM BROMIDE 18 MCG: 18 CAPSULE ORAL; RESPIRATORY (INHALATION) at 08:31

## 2022-10-16 NOTE — RESPIRATORY THERAPY NOTE
10/15/22 7162   Respiratory Assessment   Assessment Type Assess only   General Appearance Awake; Alert   Respiratory Pattern Normal   Chest Assessment Chest expansion symmetrical   Resp Comments spoke with pt about cpap pt states his settings are 10, was concerned about Full face mask and pt will try later not ready to wear, rn will call   O2 Device nc   Non-Invasive Information   O2 Interface Device Full face mask   Non-Invasive Ventilation Mode CPAP  (v30)   $ Continous NIV Initial   SpO2 91 %   $ Pulse Oximetry Spot Check Charge Completed   Non-Invasive Settings   FiO2 (%)   (2 l/m to mask)   PEEP/CPAP (cm H2O) 10   Rise Time 2   Non-Invasive Readings   Skin Intervention Skin intact   Total Rate 16   Spontaneous Vt (mL) 837   Spontaneous MV (mL) 10 4   I/E Ratio (Obs) 1:3 9   Leak (lpm) 26   Non-Invasive Alarms   Low Insp Pressure Time (sec) 60 sec   MV Low (L/min) 1   High Resp Rate (BPM) 40 BPM   OTHER   Mask Size Medium

## 2022-10-16 NOTE — ASSESSMENT & PLAN NOTE
· With history of stent and cardiac catheterization March 2021  · Continue home medications at preadmission doses  · Continue outpatient follow-up with Cardiology, Dr Phoebe Guerra

## 2022-10-16 NOTE — H&P
Sherry 44  H&P- Riya Memory 1945, 65 y o  male MRN: [id number]  Unit/Bed#: ED 24 Encounter: [id number]  Primary Care Provider: Ankit Garibay DO   Date and time admitted to hospital: 10/15/2022  4:59 PM    * Chronic obstructive pulmonary disease with acute exacerbation ([facility] [address] )  Assessment & Plan  · Patient with centrilobular emphysema - follows with Pulmonology, on azithro M/W/F  · ED treatment: Nebulizers, Solumedrol, Abx  · COPD pathway - start doxycycline 100mg q 12 h x 5 days    Acute on chronic respiratory failure with hypoxia ([facility] )  Assessment & Plan  · Patient was 80% ambulating on 3L NC  · Respiratory protocol  · COPD treatment    Stage 3a chronic kidney disease [facility])  Assessment & Plan  Lab Results   Component Value Date    EGFR 54 10/15/2022    EGFR 46 10/14/2022    EGFR 56 09/10/2022    CREATININE 1 26 10/15/2022    CREATININE 1 42 (H) 10/14/2022    CREATININE 1 21 09/10/2022   · Creatinine stable    Sleep apnea  Assessment & Plan  · cpap at bedtime    Gastroesophageal reflux disease  Assessment & Plan  · Pepcid at bedtime    Chronic diastolic (congestive) heart failure (HCC)  Assessment & Plan  Wt Readings from Last 3 Encounters:   10/15/22 98 4 kg (217 lb)   10/13/22 98 4 kg (217 lb)   10/05/22 99 1 kg (218 lb 7 6 oz)     · Continue home med  · No exacerbation        Hypertension  Assessment & Plan  · Continue home meds with hold parameters    Coronary artery disease involving native coronary artery of native heart without angina pectoris  Assessment & Plan  · With history of stent and cardiac catheterization March 2021  · Continue home medication  · Follow-up cardiology as an outpatient    Hyperlipidemia  Assessment & Plan  · Continue statin    VTE Pharmacologic Prophylaxis: VTE Score: 5 High Risk (Score >/= 5) - Pharmacological DVT Prophylaxis Ordered: enoxaparin (Lovenox)  Sequential Compression Devices Ordered    Code Status: Level 1 - Full Code   Discussion with patient    Anticipated Length of Stay: Patient will be admitted on an inpatient basis with an anticipated length of stay of greater than 2 midnights secondary to copd exacerbation, respiratory montioring, IV steroids  Chief Complaint: shortness of breath    History of Present Illness:  Shelby Huston is a 65 y o  male with a PMH of COPD w/ chronic 2L NC, Diastolic CHF, CAD w/ hx of stent, GERD, HLD, HTN, LAMBERTO on CPAP, CKD3a who presents with shortness of breath  Patient reports he had increased coughing, with sputum at home, greenish color  Normally wears 2 L NC, was bumped to 4L NC in ED and currently on 3L  Had some chills  Notes SOB is worse with activity  Felt weak walking which he feels is not normal for him  Review of Systems:  Review of Systems   Constitutional: Positive for chills and fatigue  Negative for fever  HENT: Positive for sore throat  Negative for rhinorrhea and trouble swallowing  Eyes: Negative for discharge and redness  Respiratory: Positive for cough, shortness of breath and wheezing  Gastrointestinal: Negative for abdominal pain, diarrhea, nausea and vomiting  Genitourinary: Negative for dysuria and hematuria  Musculoskeletal: Positive for neck pain  Negative for back pain  Skin: Negative for rash and wound  Neurological: Positive for dizziness and weakness  Negative for headaches  Psychiatric/Behavioral: Negative for agitation and confusion         Past Medical and Surgical History:   Past Medical History:   Diagnosis Date   • Bilateral carotid artery stenosis    • Chronic diastolic heart failure (HCC)    • Chronic ischemic heart disease    • Colon polyp    • COPD (chronic obstructive pulmonary disease) ([facility])    • Coronary artery disease     hx stents, MI, PCI   • CPAP (continuous positive airway pressure) dependence    • Hearing loss    • Hyperlipidemia    • Hypertension    • MI (myocardial infarction) ([facility] [address] ) 1995   • Myocardial infarction ([facility] )    • Pneumonia    • Prostate cancer ([facility] [address] )    • S/P carotid endarterectomy    • Shortness of breath    • Sleep apnea    • Sleep apnea, obstructive    • Stented coronary artery        Past Surgical History:   Procedure Laterality Date   • APPENDECTOMY     • CARDIAC CATHETERIZATION  03/18/2021    left main with no significant disease, proximal LAD with 10% stenosis at the site of prior stent, left circumflex artery with minimal luminal irregularities mid RCA with 50% stenosis at site of prior stent and PL segment with distal disease supplied by collaterals from the distal circumflex with no significant CAD requiring revascularization at that time  • CATARACT EXTRACTION, BILATERAL Bilateral    • COLONOSCOPY     • CORONARY ANGIOPLASTY WITH STENT PLACEMENT  1999    RCA   • CORONARY ANGIOPLASTY WITH STENT PLACEMENT  2001    RCA   • CORONARY ANGIOPLASTY WITH STENT PLACEMENT  2003    LAD   • EYE SURGERY     • LA BIOPSY OF PROSTATE,NEEDLE,TRANSPERINEAL N/A 09/13/2022    Procedure: TRANSPERINEAL MRI FUSION  BIOPSY PROSTATE;  Surgeon: Sofía Fonseca MD;  Location: [facility];  Service: Urology   • LA REVISE MEDIAN N/CARPAL TUNNEL SURG Left 07/22/2022    Procedure: RELEASE CARPAL TUNNEL;  Surgeon: Vy Wynn MD;  Location: [facility];  Service: Orthopedics   • LA REVISE ULNAR NERVE AT ELBOW Left 07/22/2022    Procedure: RELEASE CUBITAL TUNNEL;  Surgeon: Vy Wynn MD;  Location: [facility];  Service: Orthopedics   • LA REVISE ULNAR NERVE AT WRIST Left 07/22/2022    Procedure: vIania Duran;  Surgeon: Vy Wynn MD;  Location: [facility];  Service: Orthopedics   • SKIN CANCER EXCISION  2012    chin-per pt, basal cell  also right ankle       Meds/Allergies:  Prior to Admission medications    Medication Sig Start Date End Date Taking?  Authorizing Provider   albuterol (2 5 mg/3 mL) 0 083 % nebulizer solution Take 3 mL (2 5 mg total) by nebulization every 6 (six) hours as needed for wheezing or shortness of breath 5/25/22   Annemarie Lan DO   Ascorbic Acid (vitamin C) 1000 MG tablet Take 1,000 mg by mouth daily  Patient not taking: No sig reported    Historical Provider, MD   aspirin 81 mg chewable tablet CHEW ONE TABLET BY MOUTH EVERY DAY    Historical Provider, MD   azithromycin (ZITHROMAX) 250 mg tablet Take 1 every Monday, Wednesday and Friday  2/14/22 2/10/23  Annemarie Lan DO   Blood Pressure Monitor KIT Use daily  Patient taking differently: Use 40 mg daily 11/2/21   Annemarie Lan DO   famotidine (PEPCID) 20 mg tablet Take 20 mg by mouth daily at bedtime    Historical Provider, MD   fluticasone (FLONASE) 50 mcg/act nasal spray into each nostril as needed     Historical Provider, MD   fluticasone-salmeterol (Advair) 500-50 mcg/dose inhaler Inhale 1 puff in the morning Rinse mouth after use      Historical Provider, MD   furosemide (LASIX) 40 mg tablet Take 1 tablet (40 mg total) by mouth daily 1/20/22   Annemarie Lan DO   gabapentin (NEURONTIN) 300 mg capsule Take 1 capsule (300 mg total) by mouth 3 (three) times a day 10/14/22   INOCENTE Romano   methocarbamol (ROBAXIN) 500 mg tablet Take 1 tablet (500 mg total) by mouth 3 (three) times a day as needed for muscle spasms  Patient not taking: No sig reported 4/27/22   Annemarie Lan DO   metoprolol succinate (TOPROL-XL) 25 mg 24 hr tablet Take 1 tablet (25 mg total) by mouth daily  Patient taking differently: Take 75 mg by mouth daily in the early morning 7/14/22   Annemarie Lan DO   metoprolol succinate (TOPROL-XL) 50 mg 24 hr tablet Take 1 tablet (50 mg total) by mouth 2 (two) times a day  Patient taking differently: Take 50 mg by mouth daily at bedtime 3/14/22   Mary York DO   Multiple Vitamin (multivitamin) tablet Take 1 tablet by mouth daily    Historical Provider, MD   nitroglycerin (NITROSTAT) 0 4 mg SL tablet DISSOLVE ONE TABLET UNDER THE TONGUE  PRN    Historical Provider, MD   potassium chloride (Klor-Con) 10 mEq tablet Take 2 tablets (20 mEq total) by mouth daily 22   Crissie Cradle, DO   rosuvastatin (CRESTOR) 40 MG tablet Take 1 tablet (40 mg total) by mouth daily 22   Crissie Cradle, DO   Tiotropium Bromide Monohydrate (SPIRIVA RESPIMAT IN) Inhale every morning    Historical Provider, MD WHITEHEAD have reviewed home medications with patient personally  Allergies: Allergies   Allergen Reactions   • Lisinopril Swelling and Cough   • Tetanus Antitoxin Anaphylaxis   • Tetanus Toxoid Anaphylaxis and Swelling       Social History:  Marital Status: /Civil Union   Occupation: retired  Patient Pre-hospital Living Situation: With spouse, With other family member: grandkids  Patient Pre-hospital Level of Mobility: walks  Patient Pre-hospital Diet Restrictions: none  Substance Use History:   Social History     Substance and Sexual Activity   Alcohol Use Not Currently     Social History     Tobacco Use   Smoking Status Former Smoker   • Packs/day: 2 00   • Years: 35 00   • Pack years: 70 00   • Quit date: 1995   • Years since quittin 2   Smokeless Tobacco Never Used     Social History     Substance and Sexual Activity   Drug Use Never       Family History:  Family History   Problem Relation Age of Onset   • Heart attack Mother    • Dementia Mother    • No Known Problems Father        Physical Exam:     Vitals:   Blood Pressure: 166/67 (10/15/22 2156)  Pulse: 85 (10/15/22 2156)  Temperature: (!) 97 2 °F (36 2 °C) (10/15/22 1656)  Temp Source: Tympanic (10/15/22 1656)  Respirations: 16 (10/15/22 2156)  Height: 5' 8" (172 7 cm) (10/15/22 1656)  Weight - Scale: 98 4 kg (217 lb) (10/15/22 1656)  SpO2: 96 % (10/15/22 2156)    Physical Exam  Vitals reviewed  Constitutional:       General: He is not in acute distress  Appearance: Normal appearance  He is obese  Interventions: Nasal cannula in place  Comments: Pleasant elderly  male   HENT:      Head: Normocephalic and atraumatic        Right Ear: External ear normal       Left Ear: External ear normal       Nose: Nose normal    Eyes:      General:         Right eye: No discharge  Left eye: No discharge  Conjunctiva/sclera: Conjunctivae normal    Cardiovascular:      Rate and Rhythm: Normal rate and regular rhythm  Heart sounds: Normal heart sounds  No murmur heard  Pulmonary:      Effort: Pulmonary effort is normal  No respiratory distress  Breath sounds: Examination of the right-lower field reveals decreased breath sounds  Examination of the left-lower field reveals decreased breath sounds  Decreased breath sounds and wheezing (mild right base) present  No rales  Abdominal:      General: Bowel sounds are normal  There is no distension  Palpations: Abdomen is soft  Tenderness: There is no abdominal tenderness  There is no guarding  Musculoskeletal:         General: Normal range of motion  Cervical back: Normal range of motion  Skin:     General: Skin is warm and dry  Neurological:      Mental Status: He is alert and oriented to person, place, and time  Mental status is at baseline  Psychiatric:         Mood and Affect: Mood normal          Behavior: Behavior normal          Thought Content:  Thought content normal          Judgment: Judgment normal           Additional Data:     Lab Results:  Results from last 7 days   Lab Units 10/15/22  1727   WBC Thousand/uL 10 99*   HEMOGLOBIN g/dL 17 8*   HEMATOCRIT % 57 3*   PLATELETS Thousands/uL 164   NEUTROS PCT % 77*   LYMPHS PCT % 8*   MONOS PCT % 12   EOS PCT % 1     Results from last 7 days   Lab Units 10/15/22  1727   SODIUM mmol/L 142   POTASSIUM mmol/L 3 7   CHLORIDE mmol/L 100   CO2 mmol/L 37*   BUN mg/dL 20   CREATININE mg/dL 1 26   ANION GAP mmol/L 5   CALCIUM mg/dL 9 4   GLUCOSE RANDOM mg/dL 67     Results from last 7 days   Lab Units 10/15/22  1727   INR  1 02       Imaging: formal read pending  XR chest 1 view portable   ED Interpretation by Robert Rdz ROSIBEL Cueva (10/15 1739)   No obvious acute cardiopulmonary disease        ** Please Note: This note has been constructed using a voice recognition system   **

## 2022-10-16 NOTE — ASSESSMENT & PLAN NOTE
· Patient with centrilobular emphysema - follows with Pulmonology, on azithro M/W/F  · ED treatment: Nebulizers, Solumedrol, Abx  · COPD pathway - start doxycycline 100mg q 12 h x 5 days

## 2022-10-16 NOTE — ASSESSMENT & PLAN NOTE
Wt Readings from Last 3 Encounters:   10/15/22 98 4 kg (217 lb)   10/13/22 98 4 kg (217 lb)   10/05/22 99 1 kg (218 lb 7 6 oz)     · Continue home med  · No exacerbation

## 2022-10-16 NOTE — RESPIRATORY THERAPY NOTE
10/16/22 0450   Respiratory Assessment   Assessment Type Assess only   General Appearance Sleeping   Respiratory Pattern Assisted;Spontaneous   Chest Assessment Chest expansion symmetrical   Bilateral Breath Sounds Diminished;Coarse   R Breath Sounds Expiratory wheezes   L Breath Sounds Expiratory wheezes   Cough Moist;Productive   Resp Comments pt mariah cpap well asleep no changes made will cont to monitor   O2 Device cpap w/o2   Non-Invasive Information   O2 Interface Device Full face mask   Non-Invasive Ventilation Mode CPAP  (v30)   SpO2 92 %   $ Pulse Oximetry Spot Check Charge Completed   Non-Invasive Settings   FiO2 (%)   (2 l/m to mask)   PEEP/CPAP (cm H2O) 10   Rise Time 2   Non-Invasive Readings   Skin Intervention Skin intact   Total Rate 17   Spontaneous Vt (mL) 687   Spontaneous MV (mL) 8 7   I/E Ratio (Obs) 1:4 3   Leak (lpm) 38   Non-Invasive Alarms   Low Insp Pressure Time (sec) 60 sec   MV Low (L/min) 1   High Resp Rate (BPM) 40 BPM   OTHER   Mask Size Medium

## 2022-10-16 NOTE — ASSESSMENT & PLAN NOTE
· History of centrilobular emphysema   · Follows outpatient with Pulm and uses Azithromycin MWF  · Exacerbation is likely due to sitting outside in the cold on Friday watching his granddaughter's marching band   · CXR (10/16): No acute cardiopulmonary disease     · Continue home inhalers as previously prescribed and nebulizer as previously prescribed  · Will give a 5 day course of oral prednisone and doxycycline  · Resume home azithromycin Monday, Wednesday, and Friday following completion of steroids and doxycycline  · Recommend further outpatient follow-up with his pulmonologist

## 2022-10-16 NOTE — ASSESSMENT & PLAN NOTE
Lab Results   Component Value Date    EGFR 54 10/15/2022    EGFR 46 10/14/2022    EGFR 56 09/10/2022    CREATININE 1 26 10/15/2022    CREATININE 1 42 (H) 10/14/2022    CREATININE 1 21 09/10/2022   · Creatinine stable

## 2022-10-16 NOTE — RESPIRATORY THERAPY NOTE
RT Protocol Note  Daryle Brasher 65 y o  male MRN: [id number]  Unit/Bed#: ED 24 Encounter: [id number]    Assessment    Principal Problem:    Chronic obstructive pulmonary disease with acute exacerbation ([facility] )  Active Problems:    Hyperlipidemia    Coronary artery disease involving native coronary artery of native heart without angina pectoris    Hypertension    Chronic diastolic (congestive) heart failure (HCC)    Gastroesophageal reflux disease    Sleep apnea    Stage 3a chronic kidney [facility] ([facility] )    Acute on chronic respiratory failure with hypoxia [facility])      Home Pulmonary Medications:     10/15/22 2250   Respiratory Protocol   Protocol Initiated? Yes   Protocol Selection Respiratory; Airway Clearance   Language Barrier? No   Medical & Social History Reviewed? Yes   Diagnostic Studies Reviewed? Yes   Physical Assessment Performed? Yes   Home Devices/Therapy Home O2;BiPAP/CPAP;Other (Comment)  (2 l/m cont, cpap 10, nebs prn Spiriva daily , Wixela BID)   Respiratory Plan No distress/Pulmonary history; Home Bronchodilator Patient pathway;Vent/NIV/HFNC   Airway Clearance Plan Discontinue Protocol   Respiratory Assessment   Assessment Type Assess only   General Appearance Awake; Alert   Respiratory Pattern Normal   Chest Assessment Chest expansion symmetrical   Resp Comments spoke with pt about cpap pt states his settings are 10, was concerned about Full face mask and pt will try later not ready to wear, rn will call   O2 Device nc   Additional Assessments   Pulse 83   Respirations 18   SpO2 91 %   Position Semi-Taylor's     Home Devices/Therapy: (P) Home O2, BiPAP/CPAP, Other (Comment) (2 l/m cont, cpap 10, nebs prn Spiriva daily , Wixela BID)    Past Medical History:   Diagnosis Date    Bilateral carotid artery stenosis     Chronic diastolic heart failure (HCC)     Chronic ischemic heart disease     Colon polyp     COPD (chronic obstructive pulmonary disease) (HCC)     Coronary artery disease     hx stents, MI, PCI CPAP (continuous positive airway pressure) dependence     Hearing loss     Hyperlipidemia     Hypertension     MI (myocardial infarction) ([facility] [address] )     Myocardial infarction ([facility] )     Pneumonia     Prostate cancer (HCC)     S/P carotid endarterectomy     Shortness of breath     Sleep apnea     Sleep apnea, obstructive     Stented coronary artery      Social History     Socioeconomic History    Marital status: /Civil Union     Spouse name: None    Number of children: None    Years of education: None    Highest education level: None   Occupational History    None   Tobacco Use    Smoking status: Former Smoker     Packs/day: 2 00     Years: 35 00     Pack years: 70 00     Quit date: 1995     Years since quittin 2    Smokeless tobacco: Never Used   Vaping Use    Vaping Use: Never used   Substance and Sexual Activity    Alcohol use: Not Currently    Drug use: Never    Sexual activity: None   Other Topics Concern    None   Social History Narrative    None     Social Determinants of Health     Financial Resource Strain: Not on file   Food Insecurity: Not on file   Transportation Needs: Not on file   Physical Activity: Not on file   Stress: Not on file   Social Connections: Not on file   Intimate Partner Violence: Not on file   Housing Stability: Not on file       Subjective         Objective    Physical Exam:   Assessment Type: Assess only  General Appearance: Alert, Awake  Respiratory Pattern: Assisted, Spontaneous  Chest Assessment: Chest expansion symmetrical  Bilateral Breath Sounds: Diminished, Coarse  O2 Device: cpap w/02    Vitals:  Blood pressure 127/58, pulse 88, temperature (!) 97 2 °F (36 2 °C), temperature source Tympanic, resp  rate 18, height 5' 8" (1 727 m), weight 98 4 kg (217 lb), SpO2 91 %  Imaging and other studies: I have personally reviewed pertinent reports        O2 Device: cpap w/02     Plan    Respiratory Plan: (P) No distress/Pulmonary history, Home Bronchodilator Patient pathway, Vent/NIV/HFNC  Airway Clearance Plan: (P) Discontinue Protocol     Resp Comments: pt placed on home settings cpap of 10 cmh2o mariah well w/ 2l/m to mask

## 2022-10-16 NOTE — DISCHARGE SUMMARY
[address]  Discharge- Frankyashley Oh 1945, 65 y o  male MRN: [id number]  Unit/Bed#: ED 24 Encounter: [id number]  Primary Care Provider: Daja Fish DO   Date and time admitted to hospital: 10/15/2022  4:59 PM    * Chronic obstructive pulmonary disease with acute exacerbation ([facility] [address] )  Assessment & Plan  · History of centrilobular emphysema   · Follows outpatient with Pulm and uses Azithromycin MWF  · Exacerbation is likely due to sitting outside in the cold on Friday watching his granddaughter's marching band   · CXR (10/16): No acute cardiopulmonary disease  · Continue home inhalers as previously prescribed and nebulizer as previously prescribed  · Will give a 5 day course of oral prednisone and doxycycline  · Resume home azithromycin Monday, Wednesday, and Friday following completion of steroids and doxycycline  · Recommend further outpatient follow-up with his pulmonologist      Acute on chronic respiratory failure with hypoxia [facility])  Assessment & Plan  · Patient uses 2 L nasal cannula chronically with ambulation and q h s   · Oxygen saturations were 80% ambulating on 3 L upon arrival  · The morning following admission oxygen saturations have improved to the mid 90s on his home 2 L  · Further management as above    Coronary artery disease involving native coronary artery of native heart without angina pectoris  Assessment & Plan  · With history of stent and cardiac catheterization March 2021  · Continue home medications at preadmission doses  · Continue outpatient follow-up with Cardiology, Dr Damien Gray    Hypertension  Assessment & Plan  · Resume home medications at pre-admission doses    Sleep apnea  Assessment & Plan  · Continue CPAP q h s          Medical Problems             Resolved Problems  Date Reviewed: 10/15/2022   None               Discharging Physician / Practitioner: Eugenia Rebolledo  PCP: Daja Fish DO  Admission Date:   Admission Orders (From admission, onward)     Ordered        10/15/22 2121  INPATIENT ADMISSION  Once                      Discharge Date: 10/16/22    Consultations During Hospital Stay:  · None     Procedures Performed:   · None    Significant Findings / Test Results:   · CXR (10/16): No acute cardiopulmonary disease    Incidental Findings:   · None     Test Results Pending at Discharge (will require follow up): · None     Outpatient Tests Requested:  · To be determined in the outpatient setting upon follow-up with his primary care physician and pulmonologist    Complications:  None    Reason for Admission:  Acute COPD exacerbation    Hospital Course:   Heather Villagomez is a 65 y o  male patient who originally presented to the hospital on 10/15/2022 due to shortness of breath  Please see H&P as documented by Levi Rivera for complete details regarding history of presenting illness  In brief, the patient has a past medical history of COPD with chronic respiratory failure, CHF, and CAD who presented with a complaint of shortness of breath  He notes that he has had increasing cough and wheezing not relieved by his home nebulizer  He notes sitting outside in the cold on Friday evening to watch his granddaughter in the marching band with subsequent worsening of his symptoms  Upon arrival to the emergency department he was noted to have oxygen saturations in the 80s with ambulation on 3 L nasal cannula and was therefore admitted to the medical floor for treatment of acute COPD exacerbation  He was started on IV steroids and continued on his home inhalers and nebulizer therapy  Additionally he was started on doxycycline for inflammatory FX instead of his previously prescribed azithromycin  The morning following admission he noted significant improvement in his respiratory status and was back on his baseline oxygen    He was ultimately discharged home with a 5 day course of oral prednisone and doxycycline and advised to follow up outpatient with his primary care physician and pulmonologist   He was discharged in stable condition in all of his questions were answered prior to departure  This is a brief discharge summary please see full medical record for more details  The patient, initially admitted to the hospital as inpatient, was discharged earlier than expected given the following: clinical improvement       Please see above list of diagnoses and related plan for additional information  Condition at Discharge: stable    Discharge Day Visit / Exam:   Subjective:  Patient sitting up in bed without any acute complaints  He notes feeling significantly better than he did on arrival   No overnight events reported by nursing  Vitals: Blood Pressure: 106/55 (10/16/22 0827)  Pulse: 75 (10/16/22 0827)  Temperature: (!) 97 2 °F (36 2 °C) (10/16/22 0827)  Temp Source: Tympanic (10/16/22 0827)  Respirations: 18 (10/16/22 0827)  Height: 5' 8" (172 7 cm) (10/15/22 1656)  Weight - Scale: 98 4 kg (217 lb) (10/15/22 1656)  SpO2: 91 % (10/16/22 0827)     Exam:   Physical Exam  Vitals and nursing note reviewed  Constitutional:       General: He is not in acute distress  Appearance: Normal appearance  He is obese  HENT:      Head: Normocephalic and atraumatic  Mouth/Throat:      Mouth: Mucous membranes are moist       Pharynx: Oropharynx is clear  Eyes:      Extraocular Movements: Extraocular movements intact  Conjunctiva/sclera: Conjunctivae normal    Cardiovascular:      Rate and Rhythm: Normal rate and regular rhythm  Pulses: Normal pulses  Heart sounds: Normal heart sounds  No murmur heard  Pulmonary:      Effort: Pulmonary effort is normal  No respiratory distress  Breath sounds: Normal breath sounds  No wheezing  Comments: 2 L nasal cannula  Abdominal:      General: Bowel sounds are normal  There is no distension  Palpations: Abdomen is soft  Tenderness: There is no abdominal tenderness  Musculoskeletal:         General: Normal range of motion  Cervical back: Normal range of motion and neck supple  Skin:     General: Skin is warm and dry  Neurological:      General: No focal deficit present  Mental Status: He is alert and oriented to person, place, and time  Mental status is at baseline  Psychiatric:         Mood and Affect: Mood normal          Behavior: Behavior normal          Judgment: Judgment normal         Discussion with Family: Patient declined call to   Discharge instructions/Information to patient and family:   See after visit summary for information provided to patient and family  Provisions for Follow-Up Care:  See after visit summary for information related to follow-up care and any pertinent home health orders  Disposition:   Home    Planned Readmission:  None     Discharge Statement:  I spent > 35 minutes discharging the patient  This time was spent on the day of discharge  I had direct contact with the patient on the day of discharge  Greater than 50% of the total time was spent examining patient, answering all patient questions, arranging and discussing plan of care with patient as well as directly providing post-discharge instructions  Additional time then spent on discharge activities  Discharge Medications:  See after visit summary for reconciled discharge medications provided to patient and/or family        **Please Note: This note may have been constructed using a voice recognition system**

## 2022-10-16 NOTE — ASSESSMENT & PLAN NOTE
· With history of stent and cardiac catheterization March 2021  · Continue home medication  · Follow-up cardiology as an outpatient

## 2022-10-16 NOTE — DISCHARGE INSTR - AVS FIRST PAGE
Complete 5 days of Doxycycline and PO  Prednisone  Resume F Azithromycin after completing Doxycycline course

## 2022-10-16 NOTE — ASSESSMENT & PLAN NOTE
· Patient uses 2 L nasal cannula chronically with ambulation and q h s   · Oxygen saturations were 80% ambulating on 3 L upon arrival  · The morning following admission oxygen saturations have improved to the mid 90s on his home 2 L  · Further management as above

## 2022-10-17 ENCOUNTER — PATIENT OUTREACH (OUTPATIENT)
Dept: CASE MANAGEMENT | Facility: OTHER | Age: 77
End: 2022-10-17

## 2022-10-17 NOTE — PROGRESS NOTES
Biopsychosocial and Barriers Assessment    Cancer Diagnosis: Prostate  Home/Cell Phone: [phone]  Emergency Contact: Janene Ramirezw- spouse- [phone]  Marital Status: Maried  Interpretation concerns, speaks another language, preferred language: English  Cultural concerns: none  Ability to read or write: yes    Caregiver/Support: strong family support  Children: 3 daughters  Child/Elder care: n/a    Housin story home lives on first floor  Home Setup: 3 steps to enter  Lives With: Pt and his spouse live with their daughter her  and their granddaughter  Daily Living Activities: independent  Durable Medical Equipment: has portable oxygen, uses a CPAP  Ambulation: independent    Preferred Pharmacy: [facility] and [employer] co-pays with insurance: [employer] HM- high co-payemnts- is on a payment plan with [facility] co-pays with medication coverage: no  No medication coverage: n/a    Primary Care Provider: Dr Kevin Potter  Hx of  LOFTY Way: none  Hx of Short term rehab: none  Mental Health Hx: none  Substance Abuse Hx: 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  Employment: Retired- worked as an , and    [place] Status/Location: yes  Ability to pay bills: yes  POA/LW/AD: not addressed  Transportation Plan/Concerns: independent      What do you know about your Cancer Diagnosis    What has your doctor told you about your cancer diagnosis: I have prostate cancer  What has your doctor told you about your cancer treatment: I am waiting for insurance to approve the shot that was recommended and then 4-6 weeks after I will start radiation  What specific concerns do you have about your diagnosis and treatment: Is the treatment going to work and how will it effect my quality of life  Have you been made aware of any hair loss associated with treatment: N/A    Additional Comments:    OSW placed supportive outreach TC to pt this morning   He is a very pleasant gentleman with a dx of prostate cancer  He states he is waiting for the recommended shot to be approved by his insurance and then he will have radiation 4-6 weeks after that  He is concerned if the treatment will work, as well as what his quality of life will be like  Pt completed a DT, where he score a 3/10  He does have pain, and sleep issues  He also is worried about the outcome of his cancer  Pt states he has high co-payments and is on a payment plan with [facility]  OSW encouraged him to change insurance, as it is open enrollment  OSW educated on [place], however they make too much and do not qualify  He states his prescriptions are affordable  He states he and his wife are planning on doing so  Pt resides with his daughter and her family  He and his wife live on the first floor  OSW educated on support groups and pt was agreeable for this writer to send out the information  Pt does not identify and SW needs at this time, therefore the referral will be closed  OSW did provide him with this writers contact number and encouraged him to call if any needs arise in the future

## 2022-10-17 NOTE — TELEPHONE ENCOUNTER
Called the patient to schedule an appointment for [facility] injection  Patient scheduled 10/18/2022 at 130 pm at the [facility] office

## 2022-10-17 NOTE — UTILIZATION REVIEW
Initial Clinical Review    Admission: Date/Time/Statement:   Admission Orders (From admission, onward)     Ordered        10/15/22 2121  INPATIENT ADMISSION  Once                      Orders Placed This Encounter   Procedures   • INPATIENT ADMISSION     Standing Status:   Standing     Number of Occurrences:   1     Order Specific Question:   Level of Care     Answer:   Med Surg [16]     Order Specific Question:   Estimated length of stay     Answer:   More than 2 Midnights     Order Specific Question:   Certification     Answer:   I certify that inpatient services are medically necessary for this patient for a duration of greater than two midnights  See H&P and MD Progress Notes for additional information about the patient's course of treatment  ED Arrival Information     Expected   -    Arrival   10/15/2022 15:58    Acuity   Emergent            Means of arrival   Walk-In    Escorted by   Spouse    Service   Hospitalist    Admission type   Emergency            Arrival complaint   Bad Cough ; Chest Pressure            Chief Complaint   Patient presents with   • Shortness of Breath     Shortness of breath, cough, wheezing       Initial Presentation: 65 y o  male to the ED from home with complaints of shortness of breath, cough, wheezing for a day prior to arrival  Admitted to inpatient for COPD with exacerbation, CAD  H/O COPD w/ chronic 2L NC, Diastolic CHF, CAD w/ hx of stent, GERD, HLD, HTN, LAMBERTO on CPAP, CKD3a   Has Had to increase oxygen to 4LNC at home  Arrives tachypneic, pulse ox 88 %, wheezing and rhonchi  In the ED, started on iv steroids, nebulizers, abx  CXR shows no acute abnormality  Date: 10/17   Day 2:   Continue with abx, steroids  Weaned to [facility]  Discharged earlier than expected given clinical improvement       ED Triage Vitals [10/15/22 1656]   Temperature Pulse Respirations Blood Pressure SpO2   (!) 97 2 °F (36 2 °C) 85 22 129/62 (!) 88 %      Temp Source Heart Rate Source Patient Position - Orthostatic VS BP Location FiO2 (%)   Tympanic Monitor Sitting Right arm --      Pain Score       7          Wt Readings from Last 1 Encounters:   10/15/22 98 4 kg (217 lb)     Additional Vital Signs:   Date/Time Temp Pulse Resp BP MAP (mmHg) SpO2 Calculated FIO2 (%) - Nasal Cannula O2 Flow Rate (L/min) Nasal Cannula O2 Flow Rate (L/min) O2 Device O2 Interface Device Mask Size Patient Position - Orthostatic VS   10/16/22 0827 97 2 °F (36 2 °C) Abnormal  75 18 106/55 77 91 % 28 -- 2 L/min Nasal cannula -- -- Sitting   10/16/22 0754 -- -- -- -- -- 90 % 28 -- 2 L/min Nasal cannula -- -- --   10/16/22 0530 -- 78 20 107/51 73 91 % -- -- -- CPAP -- -- --   10/16/22 0450 -- 74 17 -- -- 92 % -- 2 L/min -- CPAP Full face mask Medium --   10/16/22 0130 -- 84 18 113/55 79 94 % -- -- -- CPAP -- -- --   10/16/22 0115 -- -- -- -- -- 95 % -- 2 L/min -- CPAP Full face mask -- --   10/15/22 2255 -- 88 18 127/58 84 91 % 32 -- 3 L/min Nasal cannula -- -- --   10/15/22 2251 -- 87 -- 127/58 -- -- -- -- -- -- -- -- --   10/15/22 2250 -- 83 18 -- -- 91 % -- -- -- -- Full face mask Medium --   10/15/22 2156 -- 85 16 166/67 -- 96 % 32 -- 3 L/min Nasal cannula -- -- Sitting   10/15/22 2100 -- 86 -- -- -- 96 % -- -- -- -- -- -- --   10/15/22 2015 -- -- -- -- -- 80 % Abnormal  32 -- 3 L/min Nasal cannula -- -- --   10/15/22 2000 -- -- -- -- -- 91 % 32 -- 3 L/min Nasal cannula -- -- --   10/15/22 1933 -- -- -- -- -- -- -- -- -- Nasal cannula -- -- --   10/15/22 1930 -- 90 18 166/67 -- 91 % 32 -- 3 L/min Nasal cannula -- -- Sitting   10/15/22 1825 -- 87 20 143/66 -- 100 % -- -- -- Aerosol mask -- -- --   10/15/22 1721 -- 81 20 -- -- 96 % -- -- -- -- -- -- --   10/15/22 1714 -- 81 18 129/60 87 97 % -- -- -- -- -- -- --   10/15/22 1700 -- -- -- -- -- 96 % -- -- -- -- -- -- --   10/15/22 1656 97 2 °F (36 2 °C) Abnormal  85 22 129/62 -- 88 % Abnormal  28 -- 2 L/min Nasal cannula -- -- Sitting       Pertinent Labs/Diagnostic Test Results:   10/15 EKG:Interpretation:     Interpretation: normal     Rate:     ECG rate:  81     ECG rate assessment: normal     Rhythm:     Rhythm: sinus rhythm     Ectopy:     Ectopy: none     QRS:     QRS axis:  Normal   Conduction:     Conduction: normal     ST segments:     ST segments:  Non-specific   T waves:     T waves: normal     Comments:      No evidence of acute cardiac ischemia  XR chest 1 view portable   ED Interpretation by Yakov Terry PA-C (10/15 1739)   No obvious acute cardiopulmonary disease      Final Result by Diego Gusman MD (84/45 1087)      No acute cardiopulmonary disease                    Workstation performed: [id number]           Results from last 7 days   Lab Units 10/15/22  1727   SARS-COV-2  Negative     Results from last 7 days   Lab Units 10/15/22  1727 10/14/22  1604   WBC Thousand/uL 10 99* 8 35   HEMOGLOBIN g/dL 17 8* 18 1*   HEMATOCRIT % 57 3* 57 4*   PLATELETS Thousands/uL 164 174   NEUTROS ABS Thousands/µL 8 48* 6 03         Results from last 7 days   Lab Units 10/15/22  1727 10/14/22  1604   SODIUM mmol/L 142 139   POTASSIUM mmol/L 3 7 4 4   CHLORIDE mmol/L 100 105   CO2 mmol/L 37* 30   ANION GAP mmol/L 5 4   BUN mg/dL 20 20   CREATININE mg/dL 1 26 1 42*   EGFR ml/min/1 73sq m 54 46   CALCIUM mg/dL 9 4 9 5             Results from last 7 days   Lab Units 10/15/22  1727   GLUCOSE RANDOM mg/dL 67       Results from last 7 days   Lab Units 10/15/22  2146 10/15/22  2010 10/15/22  1727   HS TNI 0HR ng/L  --   --  11   HS TNI 2HR ng/L  --  9  --    HSTNI D2 ng/L  --  -2  --    HS TNI 4HR ng/L 9  --   --    HSTNI D4 ng/L -2  --   --          Results from last 7 days   Lab Units 10/15/22  1727   PROTIME seconds 13 4   INR  1 02   PTT seconds 26     Results from last 7 days   Lab Units 10/14/22  1604   TSH 3RD GENERATON uIU/mL 0 948     Results from last 7 days   Lab Units 10/16/22  0532 10/15/22  1727   PROCALCITONIN ng/ml 0 10 0 11       Results from last 7 days   Lab Units 10/15/22  1727   BNP pg/mL 46       Results from last 7 days   Lab Units 10/15/22  1727   INFLUENZA A PCR  Negative   INFLUENZA B PCR  Negative   RSV PCR  Negative       ED Treatment:   Medication Administration from 10/15/2022 1558 to 10/17/2022 1008       Date/Time Order Dose Route Action Comments     10/15/2022 1741 methylPREDNISolone sodium succinate (Solu-MEDROL) injection 80 mg 80 mg Intravenous Given      10/15/2022 1720 albuterol inhalation solution 10 mg 10 mg Nebulization Given      10/15/2022 1721 ipratropium (ATROVENT) 0 02 % inhalation solution 1 mg 1 mg Nebulization Given      10/15/2022 1721 sodium chloride 0 9 % inhalation solution 3 mL 3 mL Nebulization Given      10/15/2022 1825 cefTRIAXone (ROCEPHIN) IVPB (premix in dextrose) 1,000 mg 50 mL 1,000 mg Intravenous New Bag      10/15/2022 1825 azithromycin (ZITHROMAX) tablet 500 mg 500 mg Oral Given      10/16/2022 0529 methylPREDNISolone sodium succinate (Solu-MEDROL) injection 40 mg 40 mg Intravenous Given      10/16/2022 0831 enoxaparin (LOVENOX) subcutaneous injection 40 mg 40 mg Subcutaneous Given      10/16/2022 0944 doxycycline hyclate (VIBRAMYCIN) capsule 100 mg 100 mg Oral Given      10/15/2022 2145 doxycycline hyclate (VIBRAMYCIN) capsule 100 mg 100 mg Oral Given      10/15/2022 2249 aspirin chewable tablet 81 mg 81 mg Oral Given      10/15/2022 2256 famotidine (PEPCID) tablet 20 mg 20 mg Oral Given      10/16/2022 0829 gabapentin (NEURONTIN) capsule 600 mg 600 mg Oral Given      10/15/2022 2254 gabapentin (NEURONTIN) capsule 300 mg 300 mg Oral Given      10/15/2022 2251 metoprolol succinate (TOPROL-XL) 24 hr tablet 50 mg 50 mg Oral Given      10/16/2022 0829 multivitamin stress formula tablet 1 tablet 1 tablet Oral Given      10/16/2022 0829 potassium chloride (K-DUR,KLOR-CON) CR tablet 10 mEq 10 mEq Oral Given      10/15/2022 2258 potassium chloride (K-DUR,KLOR-CON) CR tablet 10 mEq 10 mEq Oral Given      10/15/2022 2253 atorvastatin (LIPITOR) tablet 80 mg 80 mg Oral Given      10/16/2022 0831 tiotropium (SPIRIVA) capsule for inhaler 18 mcg 18 mcg Inhalation Given      10/16/2022 0754 levalbuterol (Esgovia Bull) inhalation solution 1 25 mg 1 25 mg Nebulization Given      10/16/2022 0754 sodium chloride 0 9 % inhalation solution 3 mL 3 mL Nebulization Given         Past Medical History:   Diagnosis Date   • Bilateral carotid artery stenosis    • Chronic diastolic heart failure ([facility])    • Chronic ischemic heart disease    • Colon polyp    • COPD (chronic obstructive pulmonary disease) ([facility])    • Coronary artery disease     hx stents, MI, PCI   • CPAP (continuous positive airway pressure) dependence    • Hearing loss    • Hyperlipidemia    • Hypertension    • MI (myocardial infarction) ([facility] [address] ) 1995   • [facility]    • Pneumonia    • Prostate cancer ([facility] )    • S/P carotid endarterectomy    • Shortness of breath    • Sleep apnea    • Sleep apnea, obstructive    • Stented coronary artery      Present on Admission:  • Chronic obstructive pulmonary disease with acute exacerbation ([facility])  • Acute on chronic respiratory failure with hypoxia ([facility])  • Sleep apnea  • Coronary artery disease involving native coronary artery of native heart without angina pectoris  • Hypertension      Admitting Diagnosis: Shortness of breath [R06 02]  Age/Sex: 65 y o  male  Admission Orders:    PO:    ASA 81 mg, lipitor, zithromax, vibramycin, pepcid, lasix, gabapentin, toprol, mvi, potassium chloride,   IV :  Ceftriaxone, methylprednisolone  Inhaled:  Xopenex, strovent, symbicort  SQ:  lovenox  Scheduled Medications:  PRN Meds:    None    Network Utilization Review Department  ATTENTION: Please call with any questions or concerns to [phone] and carefully listen to the prompts so that you are directed to the right person   All voicemails are confidential   Shiva Leblanc all requests for admission clinical reviews, approved or denied determinations and any other requests to dedicated fax number below belonging to the campus where the patient is receiving treatment   List of dedicated fax numbers for the Facilities:  [address] [place] [address] DENIALS (Administrative/Medical Necessity) [phone]   [address]  (Maternity/NICU/Pediatrics) [phone]   [address] [phone]   [facility] [phone]   [address] [address] [facility] [phone]   [address] [facility] [address] 8[facility] [phone]

## 2022-10-18 ENCOUNTER — TRANSITIONAL CARE MANAGEMENT (OUTPATIENT)
Dept: FAMILY MEDICINE CLINIC | Facility: CLINIC | Age: 77
End: 2022-10-18

## 2022-10-18 ENCOUNTER — PATIENT OUTREACH (OUTPATIENT)
Dept: HEMATOLOGY ONCOLOGY | Facility: CLINIC | Age: 77
End: 2022-10-18

## 2022-10-18 ENCOUNTER — PROCEDURE VISIT (OUTPATIENT)
Dept: UROLOGY | Facility: CLINIC | Age: 77
End: 2022-10-18
Payer: COMMERCIAL

## 2022-10-18 ENCOUNTER — TELEPHONE (OUTPATIENT)
Dept: UROLOGY | Facility: CLINIC | Age: 77
End: 2022-10-18

## 2022-10-18 ENCOUNTER — TELEPHONE (OUTPATIENT)
Dept: FAMILY MEDICINE CLINIC | Facility: CLINIC | Age: 77
End: 2022-10-18

## 2022-10-18 VITALS
BODY MASS INDEX: 33.49 KG/M2 | RESPIRATION RATE: 20 BRPM | WEIGHT: 221 LBS | DIASTOLIC BLOOD PRESSURE: 70 MMHG | SYSTOLIC BLOOD PRESSURE: 110 MMHG | HEIGHT: 68 IN

## 2022-10-18 DIAGNOSIS — C61 PROSTATE CANCER (HCC): Primary | ICD-10-CM

## 2022-10-18 DIAGNOSIS — C61 PROSTATE CANCER (HCC): ICD-10-CM

## 2022-10-18 LAB
ATRIAL RATE: 84 BPM
P AXIS: 77 DEGREES
PR INTERVAL: 164 MS
QRS AXIS: 75 DEGREES
QRSD INTERVAL: 94 MS
QT INTERVAL: 386 MS
QTC INTERVAL: 456 MS
T WAVE AXIS: 64 DEGREES
VENTRICULAR RATE: 84 BPM

## 2022-10-18 PROCEDURE — 93010 ELECTROCARDIOGRAM REPORT: CPT | Performed by: INTERNAL MEDICINE

## 2022-10-18 PROCEDURE — 96402 CHEMO HORMON ANTINEOPL SQ/IM: CPT

## 2022-10-18 NOTE — TELEPHONE ENCOUNTER
Addended by: RIMA AL on: 9/30/2019 11:46 AM     Modules accepted: Orders     Patient received ADT today (10/18/22)  Please schedule [employer]

## 2022-10-18 NOTE — PROGRESS NOTES
10/18/2022  Kristal Arce is a 65 y o  male  [id number]    Diagnosis:  Chief Complaint     Prostate Cancer          Patient presents for Eligard injection managed by Dr Gardenia Nova:  Fiducial markers and [employer]      Medication administration:  Eligard 45 mg given SQ left deltoid without incident  Patient tolerated procedure well        Vitals:    10/18/22 1350   BP: 110/70   Resp: 20   Weight: 100 kg (221 lb)   Height: 5' 8" (1 727 m)         Delisa Michelle

## 2022-10-18 NOTE — TELEPHONE ENCOUNTER
Chana Louie just wanted you to know that he is still having difficulty breathing, SOB and exasperated  He did some treatments and took cough syrup  Finally got some sleep    He is coming in next week for his TCM appt      Informed him if he needed anything before that to call    Please advise

## 2022-10-18 NOTE — UTILIZATION REVIEW
NOTIFICATION OF ADMISSION DISCHARGE   This is a Notification of Discharge from [address] [place] [address]  Please be advised that this patient has been discharge from our facility  Below you will find the admission and discharge date and time including the patient’s disposition  UTILIZATION REVIEW CONTACT:  Naveen Camarena  Utilization   Network Utilization Review Department  Phone: [phone] carefully listen to the prompts  All voicemails are confidential   Email: [email or web address]     ADMISSION INFORMATION  PRESENTATION DATE: 10/15/2022  4:59 PM  OBERVATION ADMISSION DATE:   INPATIENT ADMISSION DATE: 10/15/22  9:21 PM   DISCHARGE DATE: 10/16/2022 10:00 AM  DISPOSITION: Home/Self Care Home/Self Care      IMPORTANT INFORMATION:  Send all requests for admission clinical reviews, approved or denied determinations and any other requests to dedicated fax number below belonging to the campus where the patient is receiving treatment   List of dedicated fax numbers:  [address] DENIALS (Administrative/Medical Necessity) [phone]   [address] (Maternity/NICU/Pediatrics) [phone]   [facility] [phone]   [place] [address] [phone]   [address] [phone]   220 [facility] [phone] 90 [facility] [phone]   [address] [place], [place] [address] [place] [facility] [phone]   [facility]  [phone]   4051 [facility] [phone]   412 [facility] 850 [facility] [phone]

## 2022-10-18 NOTE — PROGRESS NOTES
Patient received ADT today (10/18/22)  Message was sent to urology surgery scheduler to schedule [employer]

## 2022-10-19 ENCOUNTER — HOSPITAL ENCOUNTER (INPATIENT)
Facility: HOSPITAL | Age: 77
LOS: 5 days | Discharge: HOME | DRG: 190 | End: 2022-10-25
Attending: EMERGENCY MEDICINE | Admitting: HOSPITALIST
Payer: COMMERCIAL

## 2022-10-19 ENCOUNTER — APPOINTMENT (EMERGENCY)
Dept: RADIOLOGY | Facility: HOSPITAL | Age: 77
DRG: 190 | End: 2022-10-19
Payer: COMMERCIAL

## 2022-10-19 DIAGNOSIS — R09.02 HYPOXIA: Primary | ICD-10-CM

## 2022-10-19 DIAGNOSIS — I50.32 CHRONIC DIASTOLIC (CONGESTIVE) HEART FAILURE (HCC): ICD-10-CM

## 2022-10-19 DIAGNOSIS — B33.8 RSV (RESPIRATORY SYNCYTIAL VIRUS INFECTION): ICD-10-CM

## 2022-10-19 DIAGNOSIS — J44.1 CHRONIC OBSTRUCTIVE PULMONARY DISEASE WITH ACUTE EXACERBATION (HCC): ICD-10-CM

## 2022-10-19 PROBLEM — G47.33 OBSTRUCTIVE SLEEP APNEA SYNDROME IN ADULT: Status: ACTIVE | Noted: 2022-10-19

## 2022-10-19 PROBLEM — H90.3 SENSORINEURAL HEARING LOSS, BILATERAL: Status: ACTIVE | Noted: 2022-10-19

## 2022-10-19 LAB
2HR DELTA HS TROPONIN: -3 NG/L
ALBUMIN SERPL BCP-MCNC: 4.1 G/DL (ref 3.5–5)
ALP SERPL-CCNC: 72 U/L (ref 34–104)
ALT SERPL W P-5'-P-CCNC: 33 U/L (ref 7–52)
ANION GAP SERPL CALCULATED.3IONS-SCNC: 9 MMOL/L (ref 4–13)
AST SERPL W P-5'-P-CCNC: 34 U/L (ref 13–39)
BASOPHILS # BLD AUTO: 0.05 THOUSANDS/ÂΜL (ref 0–0.1)
BASOPHILS NFR BLD AUTO: 1 % (ref 0–1)
BILIRUB SERPL-MCNC: 0.74 MG/DL (ref 0.2–1)
BNP SERPL-MCNC: 97 PG/ML (ref 0–100)
BUN SERPL-MCNC: 27 MG/DL (ref 5–25)
CALCIUM SERPL-MCNC: 8.5 MG/DL (ref 8.4–10.2)
CARDIAC TROPONIN I PNL SERPL HS: 21 NG/L
CARDIAC TROPONIN I PNL SERPL HS: 24 NG/L
CHLORIDE SERPL-SCNC: 98 MMOL/L (ref 96–108)
CO2 SERPL-SCNC: 30 MMOL/L (ref 21–32)
CREAT SERPL-MCNC: 1.43 MG/DL (ref 0.6–1.3)
EOSINOPHIL # BLD AUTO: 0 THOUSAND/ÂΜL (ref 0–0.61)
EOSINOPHIL NFR BLD AUTO: 0 % (ref 0–6)
ERYTHROCYTE [DISTWIDTH] IN BLOOD BY AUTOMATED COUNT: 14.6 % (ref 11.6–15.1)
GFR SERPL CREATININE-BSD FRML MDRD: 46 ML/MIN/1.73SQ M
GLUCOSE SERPL-MCNC: 108 MG/DL (ref 65–140)
HCT VFR BLD AUTO: 57.5 % (ref 36.5–49.3)
HGB BLD-MCNC: 18.2 G/DL (ref 12–17)
IMM GRANULOCYTES # BLD AUTO: 0.08 THOUSAND/UL (ref 0–0.2)
IMM GRANULOCYTES NFR BLD AUTO: 1 % (ref 0–2)
LYMPHOCYTES # BLD AUTO: 0.28 THOUSANDS/ÂΜL (ref 0.6–4.47)
LYMPHOCYTES NFR BLD AUTO: 3 % (ref 14–44)
MCH RBC QN AUTO: 32 PG (ref 26.8–34.3)
MCHC RBC AUTO-ENTMCNC: 31.7 G/DL (ref 31.4–37.4)
MCV RBC AUTO: 101 FL (ref 82–98)
MONOCYTES # BLD AUTO: 1.28 THOUSAND/ÂΜL (ref 0.17–1.22)
MONOCYTES NFR BLD AUTO: 14 % (ref 4–12)
NEUTROPHILS # BLD AUTO: 7.69 THOUSANDS/ÂΜL (ref 1.85–7.62)
NEUTS SEG NFR BLD AUTO: 81 % (ref 43–75)
NRBC BLD AUTO-RTO: 0 /100 WBCS
PLATELET # BLD AUTO: 155 THOUSANDS/UL (ref 149–390)
PMV BLD AUTO: 10.9 FL (ref 8.9–12.7)
POTASSIUM SERPL-SCNC: 4.3 MMOL/L (ref 3.5–5.3)
PROT SERPL-MCNC: 6.4 G/DL (ref 6.4–8.4)
RBC # BLD AUTO: 5.68 MILLION/UL (ref 3.88–5.62)
SODIUM SERPL-SCNC: 137 MMOL/L (ref 135–147)
WBC # BLD AUTO: 9.38 THOUSAND/UL (ref 4.31–10.16)

## 2022-10-19 PROCEDURE — 94760 N-INVAS EAR/PLS OXIMETRY 1: CPT

## 2022-10-19 PROCEDURE — 93005 ELECTROCARDIOGRAM TRACING: CPT

## 2022-10-19 PROCEDURE — 85025 COMPLETE CBC W/AUTO DIFF WBC: CPT

## 2022-10-19 PROCEDURE — 99285 EMERGENCY DEPT VISIT HI MDM: CPT

## 2022-10-19 PROCEDURE — 71045 X-RAY EXAM CHEST 1 VIEW: CPT

## 2022-10-19 PROCEDURE — 94640 AIRWAY INHALATION TREATMENT: CPT

## 2022-10-19 PROCEDURE — 84484 ASSAY OF TROPONIN QUANT: CPT

## 2022-10-19 PROCEDURE — 36415 COLL VENOUS BLD VENIPUNCTURE: CPT

## 2022-10-19 PROCEDURE — 94644 CONT INHLJ TX 1ST HOUR: CPT

## 2022-10-19 PROCEDURE — 96374 THER/PROPH/DIAG INJ IV PUSH: CPT

## 2022-10-19 PROCEDURE — 83880 ASSAY OF NATRIURETIC PEPTIDE: CPT

## 2022-10-19 PROCEDURE — 80053 COMPREHEN METABOLIC PANEL: CPT

## 2022-10-19 RX ORDER — ASPIRIN 81 MG/1
81 TABLET, CHEWABLE ORAL DAILY
Status: DISCONTINUED | OUTPATIENT
Start: 2022-10-20 | End: 2022-10-25 | Stop reason: HOSPADM

## 2022-10-19 RX ORDER — GABAPENTIN 300 MG/1
300 CAPSULE ORAL 3 TIMES DAILY
Status: DISCONTINUED | OUTPATIENT
Start: 2022-10-19 | End: 2022-10-25 | Stop reason: HOSPADM

## 2022-10-19 RX ORDER — METHYLPREDNISOLONE SODIUM SUCCINATE 125 MG/2ML
80 INJECTION, POWDER, LYOPHILIZED, FOR SOLUTION INTRAMUSCULAR; INTRAVENOUS ONCE
Status: COMPLETED | OUTPATIENT
Start: 2022-10-19 | End: 2022-10-19

## 2022-10-19 RX ORDER — DOXYCYCLINE HYCLATE 100 MG/1
100 CAPSULE ORAL EVERY 12 HOURS
Status: COMPLETED | OUTPATIENT
Start: 2022-10-19 | End: 2022-10-24

## 2022-10-19 RX ORDER — FLUTICASONE PROPIONATE 50 MCG
2 SPRAY, SUSPENSION (ML) NASAL DAILY
Status: DISCONTINUED | OUTPATIENT
Start: 2022-10-20 | End: 2022-10-25 | Stop reason: HOSPADM

## 2022-10-19 RX ORDER — METOPROLOL SUCCINATE 50 MG/1
50 TABLET, EXTENDED RELEASE ORAL
Status: DISCONTINUED | OUTPATIENT
Start: 2022-10-19 | End: 2022-10-25 | Stop reason: HOSPADM

## 2022-10-19 RX ORDER — LEVALBUTEROL 1.25 MG/.5ML
1.25 SOLUTION, CONCENTRATE RESPIRATORY (INHALATION)
Status: DISCONTINUED | OUTPATIENT
Start: 2022-10-19 | End: 2022-10-19

## 2022-10-19 RX ORDER — ALBUTEROL SULFATE 2.5 MG/3ML
SOLUTION RESPIRATORY (INHALATION)
Status: COMPLETED
Start: 2022-10-19 | End: 2022-10-19

## 2022-10-19 RX ORDER — ACETAMINOPHEN 325 MG/1
650 TABLET ORAL EVERY 6 HOURS PRN
Status: DISCONTINUED | OUTPATIENT
Start: 2022-10-19 | End: 2022-10-25 | Stop reason: HOSPADM

## 2022-10-19 RX ORDER — HEPARIN SODIUM 5000 [USP'U]/ML
5000 INJECTION, SOLUTION INTRAVENOUS; SUBCUTANEOUS EVERY 8 HOURS SCHEDULED
Status: DISCONTINUED | OUTPATIENT
Start: 2022-10-19 | End: 2022-10-25 | Stop reason: HOSPADM

## 2022-10-19 RX ORDER — FAMOTIDINE 20 MG/1
20 TABLET, FILM COATED ORAL
Status: DISCONTINUED | OUTPATIENT
Start: 2022-10-19 | End: 2022-10-25 | Stop reason: HOSPADM

## 2022-10-19 RX ORDER — LEVALBUTEROL 1.25 MG/.5ML
1.25 SOLUTION, CONCENTRATE RESPIRATORY (INHALATION)
Status: DISCONTINUED | OUTPATIENT
Start: 2022-10-20 | End: 2022-10-25 | Stop reason: HOSPADM

## 2022-10-19 RX ORDER — ATORVASTATIN CALCIUM 80 MG/1
80 TABLET, FILM COATED ORAL
Status: DISCONTINUED | OUTPATIENT
Start: 2022-10-20 | End: 2022-10-25 | Stop reason: HOSPADM

## 2022-10-19 RX ORDER — POTASSIUM CHLORIDE 750 MG/1
20 TABLET, EXTENDED RELEASE ORAL DAILY
Status: DISCONTINUED | OUTPATIENT
Start: 2022-10-20 | End: 2022-10-25 | Stop reason: HOSPADM

## 2022-10-19 RX ORDER — METHYLPREDNISOLONE SODIUM SUCCINATE 40 MG/ML
40 INJECTION, POWDER, LYOPHILIZED, FOR SOLUTION INTRAMUSCULAR; INTRAVENOUS EVERY 8 HOURS
Status: DISCONTINUED | OUTPATIENT
Start: 2022-10-19 | End: 2022-10-25 | Stop reason: HOSPADM

## 2022-10-19 RX ORDER — SODIUM CHLORIDE FOR INHALATION 0.9 %
3 VIAL, NEBULIZER (ML) INHALATION ONCE
Status: COMPLETED | OUTPATIENT
Start: 2022-10-19 | End: 2022-10-19

## 2022-10-19 RX ORDER — FUROSEMIDE 40 MG/1
40 TABLET ORAL DAILY
Status: DISCONTINUED | OUTPATIENT
Start: 2022-10-20 | End: 2022-10-20

## 2022-10-19 RX ORDER — IPRATROPIUM BROMIDE AND ALBUTEROL SULFATE 2.5; .5 MG/3ML; MG/3ML
3 SOLUTION RESPIRATORY (INHALATION)
Status: DISCONTINUED | OUTPATIENT
Start: 2022-10-19 | End: 2022-10-19

## 2022-10-19 RX ORDER — NITROGLYCERIN 0.4 MG/1
0.4 TABLET SUBLINGUAL
Status: DISCONTINUED | OUTPATIENT
Start: 2022-10-19 | End: 2022-10-25 | Stop reason: HOSPADM

## 2022-10-19 RX ADMIN — ISODIUM CHLORIDE 3 ML: 0.03 SOLUTION RESPIRATORY (INHALATION) at 16:04

## 2022-10-19 RX ADMIN — IPRATROPIUM BROMIDE 0.5 MG: 0.5 SOLUTION RESPIRATORY (INHALATION) at 20:28

## 2022-10-19 RX ADMIN — DOXYCYCLINE 100 MG: 100 CAPSULE ORAL at 22:52

## 2022-10-19 RX ADMIN — ALBUTEROL SULFATE: 2.5 SOLUTION RESPIRATORY (INHALATION) at 20:31

## 2022-10-19 RX ADMIN — ALBUTEROL SULFATE 10 MG: 2.5 SOLUTION RESPIRATORY (INHALATION) at 16:04

## 2022-10-19 RX ADMIN — HEPARIN SODIUM 5000 UNITS: 5000 INJECTION INTRAVENOUS; SUBCUTANEOUS at 22:52

## 2022-10-19 RX ADMIN — METHYLPREDNISOLONE SODIUM SUCCINATE 40 MG: 40 INJECTION, POWDER, FOR SOLUTION INTRAMUSCULAR; INTRAVENOUS at 22:52

## 2022-10-19 RX ADMIN — METHYLPREDNISOLONE SODIUM SUCCINATE 80 MG: 125 INJECTION, POWDER, FOR SOLUTION INTRAMUSCULAR; INTRAVENOUS at 14:49

## 2022-10-19 RX ADMIN — IPRATROPIUM BROMIDE 1 MG: 0.5 SOLUTION RESPIRATORY (INHALATION) at 16:04

## 2022-10-19 RX ADMIN — IPRATROPIUM BROMIDE AND ALBUTEROL SULFATE 3 ML: 2.5; .5 SOLUTION RESPIRATORY (INHALATION) at 14:49

## 2022-10-19 RX ADMIN — LEVALBUTEROL 1.25 MG: 1.25 SOLUTION, CONCENTRATE RESPIRATORY (INHALATION) at 20:28

## 2022-10-19 NOTE — TELEPHONE ENCOUNTER
Spoke with patient  States he is taking antibiotic and prednisone  He does not feel these are helping  He is currently on 3L of oxygen and reports his levels are in the low 90's    Please advise

## 2022-10-19 NOTE — RESPIRATORY THERAPY NOTE
RT Protocol Note  Ritu Rome 65 y o  male MRN: [id number]  Unit/Bed#: ED 23 Encounter: [id number]    Assessment    Active Problems:    * No active hospital problems   *      Home Pulmonary Medications:  2 5 mg albuterol prn   Home Devices/Therapy: Home O2, BiPAP/CPAP    Past Medical History:   Diagnosis Date    Bilateral carotid artery stenosis     Chronic diastolic heart failure (HCC)     Chronic ischemic heart disease     Colon polyp     COPD (chronic obstructive pulmonary disease) (HCC)     Coronary artery disease     hx stents, MI, PCI    CPAP (continuous positive airway pressure) dependence     Hearing loss     Hyperlipidemia     Hypertension     MI (myocardial infarction) ([facility] )     Myocardial infarction ([place] [address] )     Pneumonia     Prostate cancer ([place] [address] )     S/P carotid endarterectomy     Shortness of breath     Sleep apnea     Sleep apnea, obstructive     Stented coronary artery      Social History     Socioeconomic History    Marital status: /Civil Union     Spouse name: None    Number of children: None    Years of education: None    Highest education level: None   Occupational History    None   Tobacco Use    Smoking status: Former Smoker     Packs/day: 2 00     Years: 35 00     Pack years: 70 00     Quit date: 1995     Years since quittin 3    Smokeless tobacco: Never Used   Vaping Use    Vaping Use: Never used   Substance and Sexual Activity    Alcohol use: Not Currently    Drug use: Never    Sexual activity: None   Other Topics Concern    None   Social History Narrative    None     Social Determinants of Health     Financial Resource Strain: Not on file   Food Insecurity: Not on file   Transportation Needs: Not on file   Physical Activity: Not on file   Stress: Not on file   Social Connections: Not on file   Intimate Partner Violence: Not on file   Housing Stability: Not on file       Subjective         Objective    Physical Exam:   Assessment Type: During-treatment  General Appearance: Awake, Alert  Respiratory Pattern: Labored  Chest Assessment: Chest expansion symmetrical  Bilateral Breath Sounds: Expiratory wheezes    Vitals:  Blood pressure 115/62, pulse 90, temperature 99 1 °F (37 3 °C), temperature source Tympanic, resp  rate 20, height 5' 8" (1 727 m), weight 100 kg (221 lb), SpO2 94 %  Imaging and other studies: I have personally reviewed pertinent reports  Plan    Respiratory Plan: Moderate/Severe Distress pathway        Resp Comments: Changing UDn to xopenex and atrovent Q6 per protocal  Pt uses 2l cont  at home and HS cpap  Pt has been using prn UDN at home with no relief  Pt given multiple UDNs and heart neb in ED for sob/wheezing

## 2022-10-19 NOTE — TELEPHONE ENCOUNTER
Caller: Daughter     Doctor: Dr Love Mejia     Reason for call: CX the procedure for today pt is on his way to the ED    Call back#: [phone]

## 2022-10-19 NOTE — ED PROVIDER NOTES
History  Chief Complaint   Patient presents with   • Shortness of Breath     Copd exacerbation  Severe pain across chest and abdomen  Was seen here Saturday for same  18-year-old male with COPD, CAD, prostate cancer presents to the emergency department for management and evaluation shortness of breath that has been worsening since yesterday  Patient was discharged from the hospital for COPD exacerbation 3 days ago and was given doxycycline and oral prednisone to take for 5 days  Patient has been compliant with his medications  Patient normally wears a CPAP at night and states that he has been using 3L nasal cannula at baseline  Family reports that normal oxygen saturations on 2 L during the day is about 90%  Patient states that last night he started himself on 4 L nasal cannula because of his symptoms  This morning he took his nebulizers with little relief  He has been having a cough for the past week with "white" sputum production  Patient also states that he has had some chest and abdominal pain while he coughs but not at rest   Patient offers no other complaints and states that his symptoms have been improving since the ride to the emergency department without intervention  Denies nausea/vomiting, headache, dizziness, chest pain at rest, fevers, chills, blood in stool, hemoptysis, constipation, diarrhea  No history of DVT or PE and no recent leg swelling or pain        History provided by:  Patient   used: No    Shortness of Breath  Severity:  Mild  Onset quality:  Gradual  Duration:  1 day  Timing:  Intermittent  Progression:  Improving  Chronicity:  Recurrent  Relieved by:  Nothing  Worsened by:  Nothing  Ineffective treatments:  Inhaler, diuretics and oxygen  Associated symptoms: chest pain and cough    Associated symptoms: no abdominal pain, no diaphoresis, no ear pain, no fever, no headaches, no rash, no sore throat and no vomiting    Chest pain:     Quality: aching Severity:  Mild    Onset quality:  Gradual    Duration:  1 day    Timing:  Intermittent    Progression:  Unchanged    Chronicity:  New  Cough:     Cough characteristics:  Productive    Sputum characteristics:  White    Severity:  Moderate    Onset quality:  Gradual    Duration:  1 week    Timing:  Constant    Progression:  Improving    Chronicity:  Recurrent  Risk factors: hx of cancer    Risk factors: no family hx of DVT and no hx of PE/DVT        Prior to Admission Medications   Prescriptions Last Dose Informant Patient Reported? Taking? Blood Pressure Monitor KIT   No No   Sig: Use daily   Patient taking differently: Use 40 mg daily   Multiple Vitamin (multivitamin) tablet   Yes No   Sig: Take 1 tablet by mouth daily   Tiotropium Bromide Monohydrate (SPIRIVA RESPIMAT IN)  Self Yes No   Sig: Inhale every morning   albuterol (2 5 mg/3 mL) 0 083 % nebulizer solution   No No   Sig: Take 3 mL (2 5 mg total) by nebulization every 6 (six) hours as needed for wheezing or shortness of breath   aspirin 81 mg chewable tablet  Self Yes No   Sig: CHEW ONE TABLET BY MOUTH EVERY DAY   doxycycline hyclate (VIBRAMYCIN) 100 mg capsule   No No   Sig: Take 1 capsule (100 mg total) by mouth every 12 (twelve) hours for 5 doses   famotidine (PEPCID) 20 mg tablet  Self Yes No   Sig: Take 20 mg by mouth daily at bedtime   fluticasone (FLONASE) 50 mcg/act nasal spray  Self Yes No   Sig: into each nostril as needed    fluticasone-salmeterol (Advair) 500-50 mcg/dose inhaler  Self Yes No   Sig: Inhale 1 puff in the morning Rinse mouth after use     furosemide (LASIX) 40 mg tablet  Self No No   Sig: Take 1 tablet (40 mg total) by mouth daily   gabapentin (NEURONTIN) 300 mg capsule   No No   Sig: Take 1 capsule (300 mg total) by mouth 3 (three) times a day   metoprolol succinate (TOPROL-XL) 25 mg 24 hr tablet   No No   Sig: Take 1 tablet (25 mg total) by mouth daily   Patient taking differently: Take 75 mg by mouth daily in the early morning metoprolol succinate (TOPROL-XL) 50 mg 24 hr tablet   No No   Sig: Take 1 tablet (50 mg total) by mouth 2 (two) times a day   Patient taking differently: Take 50 mg by mouth daily at bedtime   nitroglycerin (NITROSTAT) 0 4 mg SL tablet  Self Yes No   Sig: DISSOLVE ONE TABLET UNDER THE TONGUE  PRN   potassium chloride (Klor-Con) 10 mEq tablet  Self No No   Sig: Take 2 tablets (20 mEq total) by mouth daily   predniSONE 20 mg tablet   No No   Sig: Take 2 tablets (40 mg total) by mouth daily for 5 days   rosuvastatin (CRESTOR) 40 MG tablet   No No   Sig: Take 1 tablet (40 mg total) by mouth daily      Facility-Administered Medications Last Administration Doses Remaining   leuprolide (ELIGARD 6 MONTH KIT) subcutaneous injection 45 mg 10/19/2022 11:53 AM 0          Past Medical History:   Diagnosis Date   • Bilateral carotid artery stenosis    • Chronic diastolic heart failure (HCC)    • Chronic ischemic heart disease    • Colon polyp    • COPD (chronic obstructive pulmonary disease) (HCC)    • Coronary artery disease     hx stents, MI, PCI   • CPAP (continuous positive airway pressure) dependence    • Hearing loss    • Hyperlipidemia    • Hypertension    • MI (myocardial infarction) ([facility] ) 1995   • Myocardial infarction ([facility] )    • Pneumonia    • Prostate cancer ([facility] )    • S/P carotid endarterectomy    • Shortness of breath    • Sleep apnea    • Sleep apnea, obstructive    • Stented coronary artery        Past Surgical History:   Procedure Laterality Date   • APPENDECTOMY     • CARDIAC CATHETERIZATION  03/18/2021    left main with no significant disease, proximal LAD with 10% stenosis at the site of prior stent, left circumflex artery with minimal luminal irregularities mid RCA with 50% stenosis at site of prior stent and PL segment with distal disease supplied by collaterals from the distal circumflex with no significant CAD requiring revascularization at that time     • CATARACT EXTRACTION, BILATERAL Bilateral    • COLONOSCOPY     • CORONARY ANGIOPLASTY WITH STENT PLACEMENT      RCA   • CORONARY ANGIOPLASTY WITH STENT PLACEMENT      RCA   • CORONARY ANGIOPLASTY WITH STENT PLACEMENT      LAD   • EYE SURGERY     • SC BIOPSY OF PROSTATE,NEEDLE,TRANSPERINEAL N/A 2022    Procedure: TRANSPERINEAL MRI FUSION  BIOPSY PROSTATE;  Surgeon: Kaylene Wilder MD;  [facility]: [facility];  Service: Urology   • SC REVISE MEDIAN N/CARPAL TUNNEL SURG Left 2022    Procedure: RELEASE CARPAL TUNNEL;  Surgeon: Princess Berrios MD;  Location[facility];  Service: Orthopedics   • SC REVISE ULNAR NERVE AT ELBOW Left 2022    Procedure: Nuris Morfin;  Surgeon: Princess Berrios MD;  [facility];  Service: Orthopedics   • SC REVISE ULNAR NERVE AT WRIST Left 2022    Procedure: Roberto Carlos Bangura;  Surgeon: Princess Berrios MD;  Location[facility];  Service: Orthopedics   • SKIN CANCER EXCISION      chin-per pt, basal cell  also right ankle       Family History   Problem Relation Age of Onset   • Heart attack Mother    • Dementia Mother    • No Known Problems Father      I have reviewed and agree with the history as documented  E-Cigarette/Vaping   • E-Cigarette Use Never User      E-Cigarette/Vaping Substances   • Nicotine No    • THC No    • CBD No    • Flavoring No    • Other No    • Unknown No      Social History     Tobacco Use   • Smoking status: Former Smoker     Packs/day: 2 00     Years: 35 00     Pack years: 70 00     Quit date: 1995     Years since quittin 3   • Smokeless tobacco: Never Used   Vaping Use   • Vaping Use: Never used   Substance Use Topics   • Alcohol use: Not Currently   • Drug use: Never       Review of Systems   Constitutional: Negative for chills, diaphoresis and fever  HENT: Negative for ear pain and sore throat  Eyes: Negative for pain and visual disturbance  Respiratory: Positive for cough and shortness of breath      Cardiovascular: Positive for chest pain  Negative for palpitations and leg swelling  Gastrointestinal: Negative for abdominal pain, nausea and vomiting  Genitourinary: Negative for dysuria and hematuria  Musculoskeletal: Negative for arthralgias and back pain  Skin: Negative for color change and rash  Neurological: Negative for dizziness, seizures, syncope, light-headedness and headaches  Psychiatric/Behavioral: Negative for confusion  All other systems reviewed and are negative  Physical Exam  Physical Exam  Vitals and nursing note reviewed  Constitutional:       General: He is not in acute distress  Appearance: He is well-developed  He is obese  He is not ill-appearing  HENT:      Head: Normocephalic and atraumatic  Right Ear: External ear normal       Left Ear: External ear normal       Mouth/Throat:      Mouth: Mucous membranes are moist       Pharynx: Oropharynx is clear  Eyes:      General: No scleral icterus  Right eye: No discharge  Left eye: No discharge  Conjunctiva/sclera: Conjunctivae normal       Pupils: Pupils are equal, round, and reactive to light  Cardiovascular:      Rate and Rhythm: Normal rate and regular rhythm  Pulses: Normal pulses  No decreased pulses  Heart sounds: Normal heart sounds  No murmur heard  Pulmonary:      Effort: Pulmonary effort is normal  No tachypnea, accessory muscle usage or respiratory distress  Breath sounds: Examination of the right-upper field reveals decreased breath sounds  Examination of the left-upper field reveals decreased breath sounds  Examination of the right-lower field reveals decreased breath sounds  Examination of the left-lower field reveals decreased breath sounds  Decreased breath sounds present  Chest:      Chest wall: No tenderness  Abdominal:      Palpations: Abdomen is soft  Tenderness: There is no abdominal tenderness  There is no guarding or rebound     Musculoskeletal:         General: No swelling, tenderness or signs of injury  Normal range of motion  Cervical back: Normal range of motion and neck supple  No rigidity  Right lower leg: No tenderness  No edema  Left lower leg: No tenderness  No edema  Skin:     General: Skin is warm and dry  Findings: No bruising, erythema or rash  Neurological:      General: No focal deficit present  Mental Status: He is alert and oriented to person, place, and time  Mental status is at baseline  Psychiatric:         Mood and Affect: Mood normal          Behavior: Behavior normal          Thought Content:  Thought content normal          Vital Signs  ED Triage Vitals [10/19/22 1401]   Temperature Pulse Respirations Blood Pressure SpO2   99 1 °F (37 3 °C) 91 20 95/58 (!) 85 %      Temp Source Heart Rate Source Patient Position - Orthostatic VS BP Location FiO2 (%)   Tympanic Monitor Sitting Left arm --      Pain Score       3           Vitals:    10/19/22 1401 10/19/22 1422 10/19/22 1825 10/19/22 1833   BP: 95/58 115/62  110/57   Pulse: 91 90 96 98   Patient Position - Orthostatic VS: Sitting            Visual Acuity      ED Medications  Medications   albuterol (2 5 mg/3 mL) 0 083 % inhalation solution **ADS Override Pull** (  Canceled Entry 10/19/22 1632)   ipratropium (ATROVENT) 0 02 % inhalation solution 0 5 mg (has no administration in time range)   levalbuterol (XOPENEX) inhalation solution 1 25 mg (has no administration in time range)   methylPREDNISolone sodium succinate (Solu-MEDROL) injection 80 mg (80 mg Intravenous Given 10/19/22 1449)   albuterol inhalation solution 10 mg (10 mg Nebulization Given 10/19/22 1604)     And   ipratropium (ATROVENT) 0 02 % inhalation solution 1 mg (1 mg Nebulization Given 10/19/22 1604)     And   sodium chloride 0 9 % inhalation solution 3 mL (3 mL Nebulization Given 10/19/22 1604)       Diagnostic Studies  Results Reviewed     Procedure Component Value Units Date/Time    HS Troponin I 2hr [[id number]]  (Normal) Collected: 10/19/22 1732    Lab Status: Final result Specimen: Blood from Arm, Right Updated: 10/19/22 1814     hs TnI 2hr 21 ng/L      Delta 2hr hsTnI -3 ng/L     HS Troponin I 4hr [[id number]]     Lab Status: No result Specimen: Blood     HS Troponin 0hr (reflex protocol) [[id number]]  (Normal) Collected: 10/19/22 1443    Lab Status: Final result Specimen: Blood from Arm, Right Updated: 10/19/22 1514     hs TnI 0hr 24 ng/L     B-Type Natriuretic Peptide(BNP) AN, CA, EA Campuses Only [[id number]]  (Normal) Collected: 10/19/22 1443    Lab Status: Final result Specimen: Blood from Arm, Right Updated: 10/19/22 1513     BNP 97 pg/mL     Comprehensive metabolic panel [[id number]]  (Abnormal) Collected: 10/19/22 1443    Lab Status: Final result Specimen: Blood from Arm, Right Updated: 10/19/22 1511     Sodium 137 mmol/L      Potassium 4 3 mmol/L      Chloride 98 mmol/L      CO2 30 mmol/L      ANION GAP 9 mmol/L      BUN 27 mg/dL      Creatinine 1 43 mg/dL      Glucose 108 mg/dL      Calcium 8 5 mg/dL      AST 34 U/L      ALT 33 U/L      Alkaline Phosphatase 72 U/L      Total Protein 6 4 g/dL      Albumin 4 1 g/dL      Total Bilirubin 0 74 mg/dL      eGFR 46 ml/min/1 73sq m     Narrative:      [facility] guidelines for Chronic Kidney Disease (CKD):   •  Stage 1 with normal or high GFR (GFR > 90 mL/min/1 73 square meters)  •  Stage 2 Mild CKD (GFR = 60-89 mL/min/1 73 square meters)  •  Stage 3A Moderate CKD (GFR = 45-59 mL/min/1 73 square meters)  •  Stage 3B Moderate CKD (GFR = 30-44 mL/min/1 73 square meters)  •  Stage 4 Severe CKD (GFR = 15-29 mL/min/1 73 square meters)  •  Stage 5 End Stage CKD (GFR <15 mL/min/1 73 square meters)  Note: GFR calculation is accurate only with a steady state creatinine    CBC and differential [[id number]]  (Abnormal) Collected: 10/19/22 1443    Lab Status: Final result Specimen: Blood from Arm, Right Updated: 10/19/22 1454     WBC 9 38 Thousand/uL RBC 5 68 Million/uL      Hemoglobin 18 2 g/dL      Hematocrit 57 5 %       fL      MCH 32 0 pg      MCHC 31 7 g/dL      RDW 14 6 %      MPV 10 9 fL      Platelets 844 Thousands/uL      nRBC 0 /100 WBCs      Neutrophils Relative 81 %      Immat GRANS % 1 %      Lymphocytes Relative 3 %      Monocytes Relative 14 %      Eosinophils Relative 0 %      Basophils Relative 1 %      Neutrophils Absolute 7 69 Thousands/µL      Immature Grans Absolute 0 08 Thousand/uL      Lymphocytes Absolute 0 28 Thousands/µL      Monocytes Absolute 1 28 Thousand/µL      Eosinophils Absolute 0 00 Thousand/µL      Basophils Absolute 0 05 Thousands/µL                  XR chest 1 view portable   ED Interpretation by Catalina Jacobs PA-C (10/19 1758)   No acute cardiopulmonary process  Procedures  ECG 12 Lead Documentation Only    Date/Time: 10/19/2022 2:47 PM  Performed by: Catalina Jacobs PA-C  Authorized by: Catalina Jacobs PA-C     Indications / Diagnosis:  Short of breath  ECG reviewed by me, the ED Provider: yes    Patient location:  ED  Interpretation:     Interpretation: non-specific    Rate:     ECG rate:  86    ECG rate assessment: normal    Rhythm:     Rhythm: sinus rhythm    Ectopy:     Ectopy: none    QRS:     QRS axis:  Normal    QRS intervals:  Normal  Conduction:     Conduction: normal    ST segments:     ST segments:  Normal  T waves:     T waves: flattening      Flattening:  AVL             ED Course  ED Course as of 10/1945   Wed Oct 19, 2022   1538 Patient walked to the bathroom without assistance without his supplemental oxygen  When he returned to his room, he was satting mid-80s  Placed him back on 3L and now satting 92%  Audible wheezing  Will proceed with rony vieira  Patient already given steroids and duoneb  Patient is also requesting rony neb as this helped him last visit  1726 Patient reports significant improvement after uribe  neb    will obtain repeat troponin and trial ambulatory pulse ox  1805 Significant drop in spO2 on ambulation  Pt dropped to 75% on 3L while walking to bathroom  1811 Discussed plan for admit with patient due to failing ambulatory pulse ox  Patient is amenable to admit             HEART Risk Score    Flowsheet Row Most Recent Value   Heart Score Risk Calculator    History 0 Filed at: 10/19/2022 1607   ECG 0 Filed at: 10/19/2022 1607   Age 2 Filed at: 10/19/2022 1607   Risk Factors 2 Filed at: 10/19/2022 1607   Troponin 1 Filed at: 10/19/2022 1607   HEART Score 5 Filed at: 10/19/2022 1607                                      MDM  Number of Diagnoses or Management Options  Chronic obstructive pulmonary disease with acute exacerbation ([facility] ): established and worsening  Hypoxia: new and requires workup  Diagnosis management comments: 67-year-old male with history of COPD and CAD on 2 L nasal cannula at baseline presenting to the emergency department for evaluation shortness of breath x1 day  Patient was recently discharged from hospital with antibiotics and steroids to treat COPD exacerbation with concurrent pneumonia  Per the discharge summary he was satting in the mid 90s on his baseline 2 L  On arrival he his hypoxic at 85% on 2 L  Patient was placed on 3 L nasal cannula which he says he is on at home, and is now satting 95%  Patient is in no acute distress and overall well-appearing  Diminished lung sounds, but otherwise benign physical exam   Likely mild COPD exacerbation requiring more oxygen than baseline  Will treat with DuoNeb and Solu-Medrol  No white count  Elevated troponin at 24, will trend  Low clinical suspicion for ACS  HEART score 5  Patient requesting uribe neb for symptoms and audible wheezing heard on re-evaluation after duoneb  Will proceed with hour long  Patient reports significant improvement after uribe neb and is requesting to go home  Patient in no distress     Ambulatory pulse ox shows significant drop in spO2 on baseline 2-3L  Will plan to admit  Patient agreeable  Amount and/or Complexity of Data Reviewed  Clinical lab tests: ordered and reviewed  Tests in the radiology section of CPT®: ordered and reviewed  Review and summarize past medical records: yes  Independent visualization of images, tracings, or specimens: yes    Patient Progress  Patient progress: stable      Disposition  Final diagnoses:   Hypoxia   Chronic obstructive pulmonary disease with acute exacerbation ([address] )     Time reflects when diagnosis was documented in both MDM as applicable and the Disposition within this note     Time User Action Codes Description Comment    10/19/2022  6:11 PM Taye Hernandes [R09 02] Hypoxia     10/19/2022  6:11 PM Taye Hernandes [J44 1] Chronic obstructive pulmonary disease with acute exacerbation [facility])       ED Disposition     ED Disposition   Admit    Condition   Stable    Date/Time   Wed Oct 19, 2022  7:13 PM    Comment   Case was discussed with Dr Nadya Tidwell and the patient's admission status was agreed to be Admission Status: observation status to the service of Dr Nadya Tidwell   Follow-up Information    None         Patient's Medications   Discharge Prescriptions    No medications on file       No discharge procedures on file      PDMP Review       Value Time User    PDMP Reviewed  Yes 7/22/2022  8:46 AM Yemi Bernabe PA-C          ED Provider  Electronically Signed by           Thai Wall PA-C  10/1945

## 2022-10-20 LAB
ANION GAP SERPL CALCULATED.3IONS-SCNC: 7 MMOL/L (ref 4–13)
ATRIAL RATE: 86 BPM
BUN SERPL-MCNC: 30 MG/DL (ref 5–25)
CALCIUM SERPL-MCNC: 8.9 MG/DL (ref 8.4–10.2)
CHLORIDE SERPL-SCNC: 97 MMOL/L (ref 96–108)
CO2 SERPL-SCNC: 33 MMOL/L (ref 21–32)
CREAT SERPL-MCNC: 1.34 MG/DL (ref 0.6–1.3)
ERYTHROCYTE [DISTWIDTH] IN BLOOD BY AUTOMATED COUNT: 14.4 % (ref 11.6–15.1)
FLUAV RNA RESP QL NAA+PROBE: NEGATIVE
FLUBV RNA RESP QL NAA+PROBE: NEGATIVE
GFR SERPL CREATININE-BSD FRML MDRD: 50 ML/MIN/1.73SQ M
GLUCOSE SERPL-MCNC: 163 MG/DL (ref 65–140)
HCT VFR BLD AUTO: 52 % (ref 36.5–49.3)
HGB BLD-MCNC: 16.4 G/DL (ref 12–17)
MCH RBC QN AUTO: 31.8 PG (ref 26.8–34.3)
MCHC RBC AUTO-ENTMCNC: 31.5 G/DL (ref 31.4–37.4)
MCV RBC AUTO: 101 FL (ref 82–98)
P AXIS: 75 DEGREES
PLATELET # BLD AUTO: 152 THOUSANDS/UL (ref 149–390)
PMV BLD AUTO: 10.6 FL (ref 8.9–12.7)
POTASSIUM SERPL-SCNC: 4.3 MMOL/L (ref 3.5–5.3)
PR INTERVAL: 160 MS
PROCALCITONIN SERPL-MCNC: 0.15 NG/ML
QRS AXIS: 73 DEGREES
QRSD INTERVAL: 92 MS
QT INTERVAL: 362 MS
QTC INTERVAL: 433 MS
RBC # BLD AUTO: 5.15 MILLION/UL (ref 3.88–5.62)
RSV RNA RESP QL NAA+PROBE: POSITIVE
SARS-COV-2 RNA RESP QL NAA+PROBE: NEGATIVE
SODIUM SERPL-SCNC: 137 MMOL/L (ref 135–147)
T WAVE AXIS: 67 DEGREES
VENTRICULAR RATE: 86 BPM
WBC # BLD AUTO: 7.36 THOUSAND/UL (ref 4.31–10.16)

## 2022-10-20 PROCEDURE — 94640 AIRWAY INHALATION TREATMENT: CPT

## 2022-10-20 PROCEDURE — 99223 1ST HOSP IP/OBS HIGH 75: CPT | Performed by: NURSE PRACTITIONER

## 2022-10-20 PROCEDURE — 85027 COMPLETE CBC AUTOMATED: CPT | Performed by: PHYSICIAN ASSISTANT

## 2022-10-20 PROCEDURE — 99223 1ST HOSP IP/OBS HIGH 75: CPT | Performed by: INTERNAL MEDICINE

## 2022-10-20 PROCEDURE — 0241U HB NFCT DS VIR RESP RNA 4 TRGT: CPT | Performed by: INTERNAL MEDICINE

## 2022-10-20 PROCEDURE — 93010 ELECTROCARDIOGRAM REPORT: CPT | Performed by: INTERNAL MEDICINE

## 2022-10-20 PROCEDURE — 94760 N-INVAS EAR/PLS OXIMETRY 1: CPT

## 2022-10-20 PROCEDURE — 94664 DEMO&/EVAL PT USE INHALER: CPT

## 2022-10-20 PROCEDURE — 80048 BASIC METABOLIC PNL TOTAL CA: CPT | Performed by: PHYSICIAN ASSISTANT

## 2022-10-20 PROCEDURE — 36415 COLL VENOUS BLD VENIPUNCTURE: CPT | Performed by: PHYSICIAN ASSISTANT

## 2022-10-20 PROCEDURE — 99223 1ST HOSP IP/OBS HIGH 75: CPT | Performed by: HOSPITALIST

## 2022-10-20 PROCEDURE — 84145 PROCALCITONIN (PCT): CPT | Performed by: PHYSICIAN ASSISTANT

## 2022-10-20 RX ORDER — BUDESONIDE 0.5 MG/2ML
0.5 INHALANT ORAL
Status: DISCONTINUED | OUTPATIENT
Start: 2022-10-20 | End: 2022-10-25 | Stop reason: HOSPADM

## 2022-10-20 RX ORDER — FUROSEMIDE 10 MG/ML
40 INJECTION INTRAMUSCULAR; INTRAVENOUS
Status: DISCONTINUED | OUTPATIENT
Start: 2022-10-20 | End: 2022-10-21

## 2022-10-20 RX ORDER — FUROSEMIDE 10 MG/ML
40 INJECTION INTRAMUSCULAR; INTRAVENOUS
Status: DISCONTINUED | OUTPATIENT
Start: 2022-10-20 | End: 2022-10-20

## 2022-10-20 RX ADMIN — METHYLPREDNISOLONE SODIUM SUCCINATE 40 MG: 40 INJECTION, POWDER, FOR SOLUTION INTRAMUSCULAR; INTRAVENOUS at 15:24

## 2022-10-20 RX ADMIN — HEPARIN SODIUM 5000 UNITS: 5000 INJECTION INTRAVENOUS; SUBCUTANEOUS at 06:34

## 2022-10-20 RX ADMIN — ATORVASTATIN CALCIUM 80 MG: 80 TABLET, FILM COATED ORAL at 15:31

## 2022-10-20 RX ADMIN — METHYLPREDNISOLONE SODIUM SUCCINATE 40 MG: 40 INJECTION, POWDER, FOR SOLUTION INTRAMUSCULAR; INTRAVENOUS at 21:17

## 2022-10-20 RX ADMIN — POTASSIUM CHLORIDE 20 MEQ: 750 TABLET, EXTENDED RELEASE ORAL at 10:27

## 2022-10-20 RX ADMIN — ASPIRIN 81 MG 81 MG: 81 TABLET ORAL at 10:27

## 2022-10-20 RX ADMIN — FLUTICASONE FUROATE AND VILANTEROL TRIFENATATE 1 PUFF: 200; 25 POWDER RESPIRATORY (INHALATION) at 10:01

## 2022-10-20 RX ADMIN — FUROSEMIDE 40 MG: 10 INJECTION, SOLUTION INTRAMUSCULAR; INTRAVENOUS at 15:17

## 2022-10-20 RX ADMIN — DOXYCYCLINE 100 MG: 100 CAPSULE ORAL at 10:26

## 2022-10-20 RX ADMIN — GABAPENTIN 300 MG: 300 CAPSULE ORAL at 15:17

## 2022-10-20 RX ADMIN — IPRATROPIUM BROMIDE 0.5 MG: 0.5 SOLUTION RESPIRATORY (INHALATION) at 10:00

## 2022-10-20 RX ADMIN — HEPARIN SODIUM 5000 UNITS: 5000 INJECTION INTRAVENOUS; SUBCUTANEOUS at 15:17

## 2022-10-20 RX ADMIN — GABAPENTIN 300 MG: 300 CAPSULE ORAL at 00:00

## 2022-10-20 RX ADMIN — DOXYCYCLINE 100 MG: 100 CAPSULE ORAL at 21:17

## 2022-10-20 RX ADMIN — BUDESONIDE 0.5 MG: 0.5 INHALANT ORAL at 22:51

## 2022-10-20 RX ADMIN — IPRATROPIUM BROMIDE 0.5 MG: 0.5 SOLUTION RESPIRATORY (INHALATION) at 15:17

## 2022-10-20 RX ADMIN — GABAPENTIN 300 MG: 300 CAPSULE ORAL at 10:27

## 2022-10-20 RX ADMIN — LIDOCAINE HYDROCHLORIDE 10 ML: 20 SOLUTION ORAL; TOPICAL at 02:16

## 2022-10-20 RX ADMIN — FAMOTIDINE 20 MG: 20 TABLET, FILM COATED ORAL at 00:00

## 2022-10-20 RX ADMIN — METOPROLOL SUCCINATE 50 MG: 50 TABLET, EXTENDED RELEASE ORAL at 21:17

## 2022-10-20 RX ADMIN — LEVALBUTEROL HYDROCHLORIDE 1.25 MG: 1.25 SOLUTION, CONCENTRATE RESPIRATORY (INHALATION) at 15:17

## 2022-10-20 RX ADMIN — METOPROLOL SUCCINATE 75 MG: 50 TABLET, EXTENDED RELEASE ORAL at 06:37

## 2022-10-20 RX ADMIN — METHYLPREDNISOLONE SODIUM SUCCINATE 40 MG: 40 INJECTION, POWDER, FOR SOLUTION INTRAMUSCULAR; INTRAVENOUS at 06:34

## 2022-10-20 RX ADMIN — IPRATROPIUM BROMIDE 0.5 MG: 0.5 SOLUTION RESPIRATORY (INHALATION) at 22:52

## 2022-10-20 RX ADMIN — FLUTICASONE PROPIONATE 2 SPRAY: 50 SPRAY, METERED NASAL at 10:26

## 2022-10-20 RX ADMIN — METOPROLOL SUCCINATE 50 MG: 50 TABLET, EXTENDED RELEASE ORAL at 00:00

## 2022-10-20 RX ADMIN — LEVALBUTEROL HYDROCHLORIDE 1.25 MG: 1.25 SOLUTION, CONCENTRATE RESPIRATORY (INHALATION) at 22:52

## 2022-10-20 RX ADMIN — LEVALBUTEROL HYDROCHLORIDE 1.25 MG: 1.25 SOLUTION, CONCENTRATE RESPIRATORY (INHALATION) at 10:01

## 2022-10-20 RX ADMIN — GABAPENTIN 300 MG: 300 CAPSULE ORAL at 21:17

## 2022-10-20 RX ADMIN — HEPARIN SODIUM 5000 UNITS: 5000 INJECTION INTRAVENOUS; SUBCUTANEOUS at 21:17

## 2022-10-20 RX ADMIN — FUROSEMIDE 40 MG: 10 INJECTION, SOLUTION INTRAMUSCULAR; INTRAVENOUS at 11:45

## 2022-10-20 RX ADMIN — FAMOTIDINE 20 MG: 20 TABLET, FILM COATED ORAL at 21:17

## 2022-10-20 NOTE — ASSESSMENT & PLAN NOTE
Wt Readings from Last 3 Encounters:   10/19/22 100 kg (221 lb)   10/18/22 100 kg (221 lb)   10/15/22 98 4 kg (217 lb)     · Does not appear to be in exacerbation at this time  · Will obtain daily weights  · Place on no added salt diet  · Continue pre-hospital Toprol XL 75 mg p o  daily and 50 mg p o  q h s

## 2022-10-20 NOTE — CONSULTS
Pulmonary Medicine-Consultation  Marietta Verduzco 65 y o  male MRN: [id number]  Unit/Bed#: ED 16 Encounter: [id number]      Assessment/Plan:    1  Acute on chronic hypoxic/hypercapnic respiratory failure  2  COPD/emphysema  3  Chronic systolic/diastolic CHF  4  Class 1 obesity  5  Obstructive sleep apnea    Recommendations  · Overall seems to have a slowly progressive declining in functional status, respiratory compromise  · Recently seem to have exacerbation possibly due to cold exposure/recently was at the football game/URI/possible viral infection given the rhinitis  · For now, added RSV/flu/COVID 19 PCR  · Agree with a trial of diuresis, monitor renal function closely  · Continue Solu-Medrol 40 Q 8, doxycycline 100 b i d  · Switched to nebulized only treatment, added budesonide, discontinued inhaler José Manuel Sinning  · Continue Atrovent/Xopenex    Physician Requesting Consult: Lucy Mcardle,     Reason for Consult / Principal Problem:  COPD exacerbation/hypoxic respiratory failure    HPI:   65 y o  male with a h/o CKD 3, prostate cancer, carotid stenosis, chronic diastolic heart failure, CAD, HTN, COPD/emphysema, chronic respiratory failure, obstructive sleep apnea, former tobacco abuse  Recently hospitalized from 10/15 until 10/16 with shortness of breath, treated for COPD exacerbation and discharged on 5 days course of prednisone/doxycycline  Patient presented to the ED again last night due to persistent symptoms  Reports slowly progressive dyspnea on exertion over the past few years, with decreased exercise capacity  Also reported subjective fever, rhinitis/and sore throat  Most recently noted more cough/with production of a clear sputum  This admission noted normal procalcitonin, no leukocytosis negative troponin, negative flu/RSV/COVID19 at last visit but not checked this time  No focal infiltrates on chest x-ray, hyperinflated lungs with bibasilar/peripheral atelectatic changes      On my interview, is resting in bed no dyspnea at rest, still with minimally productive cough  Frustrated about his declining exercise capacity due to dyspnea/COPD and underlying CHF  Inpatient consult to Pulmonology  Consult performed by: Janel Colón MD  Consult ordered by: Sena Meléndez PA-C          Review of Systems  As per hpi, all other systems reviewed and were negative      Studies:    Imaging Studies: I have personally reviewed pertinent films in PACS    EKG, Pathology, and Other Studies: I have personally reviewed pertinent films in PACS    Pulmonary Results (PFTs, PSG): None in file    Historical Information   Past Medical History:   Diagnosis Date   • Bilateral carotid artery stenosis    • Chronic diastolic heart failure (HCC)    • Chronic ischemic heart disease    • Colon polyp    • COPD (chronic obstructive pulmonary disease) ([facility] )    • Coronary artery disease     hx stents, MI, PCI   • CPAP (continuous positive airway pressure) dependence    • Hearing loss    • Hyperlipidemia    • Hypertension    • MI (myocardial infarction) ([facility] ) 1995   • Myocardial infarction ([facility] )    • Pneumonia    • Prostate cancer ([facility] )    • S/P carotid endarterectomy    • Shortness of breath    • Sleep apnea    • Sleep apnea, obstructive    • Stented coronary artery      Past Surgical History:   Procedure Laterality Date   • APPENDECTOMY     • CARDIAC CATHETERIZATION  03/18/2021    left main with no significant disease, proximal LAD with 10% stenosis at the site of prior stent, left circumflex artery with minimal luminal irregularities mid RCA with 50% stenosis at site of prior stent and PL segment with distal disease supplied by collaterals from the distal circumflex with no significant CAD requiring revascularization at that time     • CATARACT EXTRACTION, BILATERAL Bilateral    • COLONOSCOPY     • CORONARY ANGIOPLASTY WITH STENT PLACEMENT  1999    RCA   • CORONARY ANGIOPLASTY WITH STENT PLACEMENT  2001    RCA   • CORONARY ANGIOPLASTY WITH STENT PLACEMENT      LAD   • EYE SURGERY     • GA BIOPSY OF PROSTATE,NEEDLE,TRANSPERINEAL N/A 2022    Procedure: TRANSPERINEAL MRI FUSION  BIOPSY PROSTATE;  Surgeon: Ary Tovar MD;  [facility];  Service: Urology   • GA REVISE MEDIAN N/CARPAL TUNNEL SURG Left 2022    Procedure: RELEASE CARPAL TUNNEL;  Surgeon: Inocente Foster MD;  [facility];  Service: Orthopedics   • GA REVISE ULNAR NERVE AT ELBOW Left 2022    Procedure: Sherrill Ortiz;  Surgeon: Inocente Foster MD;  [facility];  Service: Orthopedics   • GA REVISE ULNAR NERVE AT WRIST Left 2022    Procedure: Ysabel Harris;  Surgeon: Inocente Foster MD;  [facility];  Service: Orthopedics   • SKIN CANCER EXCISION      chin-per pt, basal cell  also right ankle     Social History   Social History     Substance and Sexual Activity   Alcohol Use Not Currently     Social History     Substance and Sexual Activity   Drug Use Never     Social History     Tobacco Use   Smoking Status Former Smoker   • Packs/day: 2 00   • Years: 35 00   • Pack years: 70 00   • Quit date: 1995   • Years since quittin 3   Smokeless Tobacco Never Used     E-Cigarette/Vaping   • E-Cigarette Use Never User      E-Cigarette/Vaping Substances   • Nicotine No    • THC No    • CBD No    • Flavoring No    • Other No    • Unknown No          Family History:   Family History   Problem Relation Age of Onset   • Heart attack Mother    • Dementia Mother    • No Known Problems Father        Meds/Allergies   all current active meds have been reviewed    Allergies   Allergen Reactions   • Lisinopril Swelling and Cough   • Tetanus Antitoxin Anaphylaxis   • Tetanus Toxoid Anaphylaxis and Swelling       Objective   Vitals: Blood pressure 151/71, pulse 85, temperature 98 2 °F (36 8 °C), temperature source Tympanic, resp   rate 18, height 5' 8" (1 727 m), weight 100 kg (221 lb), SpO2 (!) 86 % ,Body mass index is 33 6 kg/m²  Intake/Output Summary (Last 24 hours) at 10/20/2022 1323  Last data filed at 10/20/2022 1251  Gross per 24 hour   Intake --   Output 600 ml   Net -600 ml     Invasive Devices  Report    Peripheral Intravenous Line  Duration           Peripheral IV 10/19/22 Distal;Right;Upper;Ventral (anterior) Arm <1 day                Physical Exam  Body mass index is 33 6 kg/m²  Gen: not in acute distress, obese  Neck/Eyes: supple, PERRL  Ear: normal appearance,mild hearing impairment  Nose:  normal nasal mucosa, no drainage  Mouth:  unremarkable/normal appearance of lips, teeth and gums  Oropharynx: mucosa is moist, no focal lesions or erythema  Salivary glands: soft nontender  Chest: normal respiratory efforts, diminished/to expansion/breath sounds, fine basilar crackles  CV: no murmurs appreciated, no edema  Abdomen: soft, non tender  Extremities:  No observed deformity   Skin: unremarkable  Neuro: AAO X3, no focal motor deficit       Lab Results: I have personally reviewed pertinent lab results  None    Portions of the record may have been created with voice recognition software  Occasional wrong word or "sound a like" substitutions may have occurred due to the inherent limitations of voice recognition software  Read the chart carefully and recognize, using context, where substitutions have occurred

## 2022-10-20 NOTE — ASSESSMENT & PLAN NOTE
Lab Results   Component Value Date    EGFR 50 10/20/2022    EGFR 46 10/19/2022    EGFR 54 10/15/2022    CREATININE 1 34 (H) 10/20/2022    CREATININE 1 43 (H) 10/19/2022    CREATININE 1 26 10/15/2022     Monitor renal function on diuretic therapy

## 2022-10-20 NOTE — H&P
S[facility] 44  H&P- Joe Stalling 1945, 65 y o  male MRN: [id number]  Unit/Bed#: ED 16 Encounter: [id number]  Primary Care Provider: Isabel Simental DO   Date and time admitted to hospital: 10/19/2022  2:05 PM    * Chronic obstructive pulmonary disease with acute exacerbation ([facility] )  Assessment & Plan  · Placed in observation Medicine  · Give Xopenex and Atrovent nebs q 8 hours  · Continue pre-hospital Breo 200/25 mcg 1 inhalation daily  · Give Solu-Medrol 40 mg IV q 8 hours  · Placed on O2 and respiratory protocol  · Consult pulmonary    Acute on chronic respiratory failure with hypoxia ([facility] )  Assessment & Plan  · Secondary to COPD exacerbation being treated as per above  · Patient normally requires 2 L q h s  and as needed during the day  · Is now requiring 3 L and had previously been up as high as 4 L    Chronic diastolic (congestive) heart failure (HCC)  Assessment & Plan  Wt Readings from Last 3 Encounters:   10/19/22 100 kg (221 lb)   10/18/22 100 kg (221 lb)   10/15/22 98 4 kg (217 lb)     · Does not appear to be in exacerbation at this time  · Will obtain daily weights  · Place on no added salt diet  · Continue pre-hospital Toprol XL 75 mg p o  daily and 50 mg p o  q h s  Obstructive sleep apnea syndrome in adult  Assessment & Plan  Continue pre-hospital CPAP q h s  Stage 3a chronic kidney disease [facility])  Assessment & Plan  Lab Results   Component Value Date    EGFR 46 10/19/2022    EGFR 54 10/15/2022    EGFR 46 10/14/2022    CREATININE 1 43 (H) 10/19/2022    CREATININE 1 26 10/15/2022    CREATININE 1 42 (H) 10/14/2022     Creatinine appears to be approximately baseline, will continue to monitor with repeat labs in a m  Gastroesophageal reflux disease  Assessment & Plan  Continue pre-hospital Pepcid 20 mg p o  q h s  Hypertension  Assessment & Plan  Continue pre-hospital Toprol 75 mg p o  q a m  and 50 mg p o  q h s  and Lasix 40 mg p o  daily    Hyperlipidemia  Assessment & Plan  Substitute Lipitor 80 mg p o  daily for pre-hospital Crestor      VTE Prophylaxis: Heparin  Code Status:  Level 1  POLST: There is no POLST form on file for this patient (pre-hospital)  Discussion with family:  None present at bedside at time of exam    Anticipated Length of Stay:  Patient will be admitted on an Observation basis with an anticipated length of stay of  < 2 midnights  Justification for Hospital Stay:  COPD exacerbation requiring O2 support, frequent nebulizers, IV steroids and further pulmonary evaluation    Chief Complaint:   Shortness of breath x5 days    History of Present Illness:    Elidia Mendenhall is a 65 y o  male who presents with shortness of breath x5 days  Patient with a known history of COPD and chronic hypoxic respiratory failure who uses O2 2 L nasal cannula q h s  presents ER for further evaluation treatment with increasing dyspnea to the point of dyspnea at rest and increasing O2 requirements  Patient was recently discharged from our facility on 10/16/2022 with a discharge diagnosis of COPD exacerbation  Patient complains of some subjective fevers, nasal congestion and sore throat additionally reports a cough which is productive of clear to white sputum  Review of Systems:  Review of Systems   Constitutional: Negative for chills and fever  Respiratory: Positive for cough and shortness of breath  Negative for wheezing  Cardiovascular: Negative for chest pain and palpitations  Gastrointestinal: Negative for diarrhea, nausea and vomiting  Genitourinary: Negative for dysuria, frequency, hematuria and urgency  Neurological: Negative for weakness, light-headedness and headaches  All other systems reviewed and are negative        Past Medical and Surgical History:   Past Medical History:   Diagnosis Date   • Bilateral carotid artery stenosis    • Chronic diastolic heart failure (HCC)    • Chronic ischemic heart disease    • Colon polyp    • COPD (chronic obstructive pulmonary disease) (HCC)    • Coronary artery disease     hx stents, MI, PCI   • CPAP (continuous positive airway pressure) dependence    • Hearing loss    • Hyperlipidemia    • Hypertension    • MI (myocardial infarction) ([facility] ) 1995   • Myocardial infarction ([facility] )    • Pneumonia    • Prostate cancer ([facility] )    • S/P carotid endarterectomy    • Shortness of breath    • Sleep apnea    • Sleep apnea, obstructive    • Stented coronary artery        Past Surgical History:   Procedure Laterality Date   • APPENDECTOMY     • CARDIAC CATHETERIZATION  03/18/2021    left main with no significant disease, proximal LAD with 10% stenosis at the site of prior stent, left circumflex artery with minimal luminal irregularities mid RCA with 50% stenosis at site of prior stent and PL segment with distal disease supplied by collaterals from the distal circumflex with no significant CAD requiring revascularization at that time     • CATARACT EXTRACTION, BILATERAL Bilateral    • COLONOSCOPY     • CORONARY ANGIOPLASTY WITH STENT PLACEMENT  1999    RCA   • CORONARY ANGIOPLASTY WITH STENT PLACEMENT  2001    RCA   • CORONARY ANGIOPLASTY WITH STENT PLACEMENT  2003    LAD   • EYE SURGERY     • AL BIOPSY OF PROSTATE,NEEDLE,TRANSPERINEAL N/A 09/13/2022    Procedure: TRANSPERINEAL MRI FUSION  BIOPSY PROSTATE;  Surgeon: Boaz White MD;  [facility];  Service: Urology   • AL REVISE MEDIAN N/CARPAL TUNNEL SURG Left 07/22/2022    Procedure: RELEASE CARPAL TUNNEL;  Surgeon: Fransisca Shukla MD;  [facility];  Service: Orthopedics   • AL REVISE ULNAR NERVE AT ELBOW Left 07/22/2022    Procedure: RELEASE CUBITAL TUNNEL;  Surgeon: Fransisac Shukla MD;  [facility];  Service: Orthopedics   • AL REVISE ULNAR NERVE AT WRIST Left 07/22/2022    Procedure: Eual Jefferson;  Surgeon: Fransisca Shukla MD;  [facility];  Service: Orthopedics   • SKIN CANCER EXCISION  2012 chin-per pt, basal cell  also right ankle       Meds/Allergies:  Prior to Admission medications    Medication Sig Start Date End Date Taking? Authorizing Provider   albuterol (2 5 mg/3 mL) 0 083 % nebulizer solution Take 3 mL (2 5 mg total) by nebulization every 6 (six) hours as needed for wheezing or shortness of breath 5/25/22   Melanie Martines DO   aspirin 81 mg chewable tablet [address]    Historical Provider, MD   Blood Pressure Monitor KIT Use daily  Patient taking differently: Use 40 mg daily 11/2/21   Melanie Martines DO   doxycycline hyclate (VIBRAMYCIN) 100 mg capsule Take 1 capsule (100 mg total) by mouth every 12 (twelve) hours for 5 doses 10/16/22 10/19/22  [facility] Nida Clemens, DO   famotidine (PEPCID) 20 mg tablet Take 20 mg by mouth daily at bedtime    Historical Provider, MD   fluticasone (FLONASE) 50 mcg/act nasal spray into each nostril as needed     Historical Provider, MD   fluticasone-salmeterol (Advair) 500-50 mcg/dose inhaler Inhale 1 puff in the morning Rinse mouth after use      Historical Provider, MD   furosemide (LASIX) 40 mg tablet Take 1 tablet (40 mg total) by mouth daily 1/20/22   Melanie Martines DO   gabapentin (NEURONTIN) 300 mg capsule Take 1 capsule (300 mg total) by mouth 3 (three) times a day 10/14/22   INOCENTE Johnson   metoprolol succinate (TOPROL-XL) 25 mg 24 hr tablet Take 1 tablet (25 mg total) by mouth daily  Patient taking differently: Take 75 mg by mouth daily in the early morning 7/14/22   Melanie Martines DO   metoprolol succinate (TOPROL-XL) 50 mg 24 hr tablet Take 1 tablet (50 mg total) by mouth 2 (two) times a day  Patient taking differently: Take 50 mg by mouth daily at bedtime 3/14/22   Kellen Adams DO   Multiple Vitamin (multivitamin) tablet Take 1 tablet by mouth daily    Historical Provider, MD   nitroglycerin (NITROSTAT) 0 4 mg SL tablet DISSOLVE ONE TABLET UNDER THE TONGUE  PRN    Historical Provider, MD   potassium chloride (Klor-Con) 10 mEq tablet Take 2 tablets (20 mEq total) by mouth daily 22   Blease Apt, DO   predniSONE 20 mg tablet Take 2 tablets (40 mg total) by mouth daily for 5 days 10/16/22 10/21/22  Lidyakarena Pabon Clemens, DO   rosuvastatin (CRESTOR) 40 MG tablet Take 1 tablet (40 mg total) by mouth daily 22   Blease Apt, DO   Tiotropium Bromide Monohydrate (SPIRIVA RESPIMAT IN) Inhale every morning    Historical Provider, MD   Ascorbic Acid (vitamin C) 1000 MG tablet Take 1,000 mg by mouth daily  Patient not taking: No sig reported  10/15/22  Historical Provider, MD   methocarbamol (ROBAXIN) 500 mg tablet Take 1 tablet (500 mg total) by mouth 3 (three) times a day as needed for muscle spasms  Patient not taking: No sig reported 4/27/22 10/15/22  Blease Apt, DO     I have reviewed home medications with patient personally  Allergies:    Allergies   Allergen Reactions   • Lisinopril Swelling and Cough   • Tetanus Antitoxin Anaphylaxis   • Tetanus Toxoid Anaphylaxis and Swelling       Social History:  Marital Status: /Civil Union   Occupation:  Retired  Patient Pre-hospital Living Situation:  Resides at home with family  Patient Pre-hospital Level of Mobility:  Full without assist  Patient Pre-hospital Diet Restrictions:  Low-salt    Social History     Substance and Sexual Activity   Alcohol Use Not Currently     Social History     Tobacco Use   Smoking Status Former Smoker   • Packs/day: 2 00   • Years: 35 00   • Pack years: 70 00   • Quit date: 1995   • Years since quittin 3   Smokeless Tobacco Never Used     Social History     Substance and Sexual Activity   Drug Use Never       Family History:  I have reviewed the patients family history    Physical Exam:   Vitals:   Blood Pressure: 117/59 (10/20/22 0000)  Pulse: 81 (10/20/22 0000)  Temperature: 99 1 °F (37 3 °C) (10/19/22 1401)  Temp Source: Tympanic (10/19/22 1401)  Respirations: 19 (10/20/22 0000)  Height: 5' 8" (172 7 cm) (10/19/22 1401)  Weight - Scale: 100 kg (221 lb) (10/19/22 1401)  SpO2: 92 % (10/20/22 0000)    Physical Exam  Vitals and nursing note reviewed  Constitutional:       General: He is not in acute distress  Appearance: Normal appearance  HENT:      Head: Normocephalic and atraumatic  Right Ear: Tympanic membrane normal       Left Ear: Tympanic membrane normal       Nose: Nose normal       Mouth/Throat:      Mouth: Mucous membranes are moist       Pharynx: No oropharyngeal exudate or posterior oropharyngeal erythema  Eyes:      Extraocular Movements: Extraocular movements intact  Pupils: Pupils are equal, round, and reactive to light  Cardiovascular:      Rate and Rhythm: Normal rate and regular rhythm  Pulses: Normal pulses  Heart sounds: Normal heart sounds  Pulmonary:      Effort: Pulmonary effort is normal  No respiratory distress  Breath sounds: Normal breath sounds  Decreased air movement present  No wheezing, rhonchi or rales  Abdominal:      General: Abdomen is flat  Bowel sounds are normal       Palpations: Abdomen is soft  Musculoskeletal:         General: Normal range of motion  Cervical back: Normal range of motion and neck supple  Right lower leg: No edema  Left lower leg: No edema  Skin:     General: Skin is warm and dry  Capillary Refill: Capillary refill takes less than 2 seconds  Neurological:      General: No focal deficit present  Mental Status: He is alert and oriented to person, place, and time  Additional Data:   Lab Results: I have personally reviewed pertinent reports      Results from last 7 days   Lab Units 10/19/22  1443   WBC Thousand/uL 9 38   HEMOGLOBIN g/dL 18 2*   HEMATOCRIT % 57 5*   PLATELETS Thousands/uL 155   NEUTROS PCT % 81*   LYMPHS PCT % 3*   MONOS PCT % 14*   EOS PCT % 0     Results from last 7 days   Lab Units 10/19/22  1443   SODIUM mmol/L 137   POTASSIUM mmol/L 4 3   CHLORIDE mmol/L 98   CO2 mmol/L 30   BUN mg/dL 27*   CREATININE mg/dL 1 43*   CALCIUM mg/dL 8 5   ALK PHOS U/L 72   ALT U/L 33   AST U/L 34     Results from last 7 days   Lab Units 10/15/22  1727   INR  1 02               Imaging: I have personally reviewed pertinent reports  XR chest 1 view portable   ED Interpretation by Arelis Boyd PA-C (10/19 9878)   No acute cardiopulmonary process  EKG, Pathology, and Other Studies Reviewed on Admission:   · EKG: N/A    Epic Records Reviewed: Yes     ** Please Note: This note has been constructed using a voice recognition system   **

## 2022-10-20 NOTE — ASSESSMENT & PLAN NOTE
Wt Readings from Last 3 Encounters:   10/19/22 100 kg (221 lb)   10/18/22 100 kg (221 lb)   10/15/22 98 4 kg (217 lb)     Lasix 40 IV bid today, transition to home regimen tomorrow  Low Na diet, daily weights, I/O monitoring

## 2022-10-20 NOTE — ASSESSMENT & PLAN NOTE
· Placed in observation Medicine  · Give Xopenex and Atrovent nebs q 8 hours  · Continue pre-hospital Breo 200/25 mcg 1 inhalation daily  · Give Solu-Medrol 40 mg IV q 8 hours  · Placed on O2 and respiratory protocol  · Consult pulmonary

## 2022-10-20 NOTE — UTILIZATION REVIEW
Initial Clinical Review    OBSERVATION WRITTEN 10/9/22 @ 5980 Tucker Damon 10/20/22 @ 0940 DUE TO FURTHER DIAGNOSTIC WORKUP REQUIRED FOR COPD EXACERBATION, REQUIRING AT LEAST A 2 MIDNIGHT STAY  Admission: Date/Time/Statement:   Admission Orders (From admission, onward)     Ordered        10/20/22 0940  Inpatient Admission  Once            10/19/22 1914  Place in Observation  Once                      Orders Placed This Encounter   Procedures   • Inpatient Admission     Standing Status:   Standing     Number of Occurrences:   1     Order Specific Question:   Level of Care     Answer:   Med Surg [16]     Order Specific Question:   Estimated length of stay     Answer:   More than 2 Midnights     Order Specific Question:   Certification     Answer:   I certify that inpatient services are medically necessary for this patient for a duration of greater than two midnights  See H&P and MD Progress Notes for additional information about the patient's course of treatment  ED Arrival Information     Expected   -    Arrival   10/19/2022 13:36    Acuity   Urgent            Means of arrival   Walk-In    Escorted by   Spouse    Service   Hospitalist    Admission type   Emergency            Arrival complaint   sob, chest pain           Chief Complaint   Patient presents with   • Shortness of Breath     Copd exacerbation  Severe pain across chest and abdomen  Was seen here Saturday for same  Initial Presentation: 65 y o  male with PMH of COPD andchronic hypoxic respiratory failure who uses O2 2 L nasal cannula q h s , who presents with shortness of breath x5 days with dyspnea at rest and increasing O2 requirements  Patient was recently discharged from our facility on 10/16/2022 with a discharge diagnosis of COPD exacerbation  Patient complains of some subjective fevers, nasal congestion and sore throat additionally reports a cough which is productive of clear to white sputum    In ED, O 2 sat 85% on 2 L NC, RSV positive  Pt given neb txs, IV steroids in ED  Initially admitted under Observation status then changed to Inpatient dt COPD Exacerbation and Acute on chronic respiratory failure w/ hypoxia, COPD: give neb txs, IV steroid, inhaler, pulmonology and cardiology consults, monitor O2 sat and provide supplemental O2, daily wts, no added salt diet, continue home meds  10/20 Pulmonology Consult:  Overall seems to have a slowly progressive declining in functional status, respiratory compromise  Recently seem to have exacerbation possibly due to cold exposure/recently was at the [employer] game/URI/possible viral infection given the rhinitis  For now, added RSV/flu/COVID 19 PCR  Agree with a trial of diuresis, monitor renal function closely  Continue Solu-Medrol 40 Q 8, doxycycline 100 b i d  Switched to nebulized only treatment, added budesonide, discontinued inhaler Breo, Continue Atrovent/Xopenex  10/20 Cardiology Consult:  EKG:  Sinus rhythm nonspecific ST T wave changes  BP adequately controlled  IV lasix bid today and transition to home regimen on 10/21, continue low Na diet with daily wts and IO monitoring  Monitor renal function daily  Date:  10/21   Day 2:    Pt states of feeling slightly better today  Continue to wheezing and on 3 L supplemental O2, wean to baseline as able  WBC increase to 14 95 today  Pulmonology consult, continue steroids, slight improvement in CKD w/ IV diuresis, continue to monitor renal function and labs, IO and daily wts, 2 gm Na restricted diet      ED Triage Vitals [10/19/22 1401]   Temperature Pulse Respirations Blood Pressure SpO2   99 1 °F (37 3 °C) 91 20 95/58 (!) 85 %      Temp Source Heart Rate Source Patient Position - Orthostatic VS BP Location FiO2 (%)   Tympanic Monitor Sitting Left arm --      Pain Score       3          Wt Readings from Last 1 Encounters:   10/19/22 100 kg (221 lb)     Additional Vital Signs:   Date/Time Temp Pulse Resp BP MAP (mmHg) SpO2 Calculated FIO2 (%) - Nasal Cannula Nasal Cannula O2 Flow Rate (L/min) O2 Device   10/21/22 07:46:58 96 6 °F (35 9 °C) Abnormal  71 16 117/73 88 91 % -- -- --   10/21/22 0743 -- -- -- -- -- 90 % 32 3 L/min Nasal cannula   10/21/22 0507 -- 80 -- 137/79 -- -- -- -- --   10/20/22 22:59:55 97 5 °F (36 4 °C) 85 19 117/86 96 94 % -- -- --   10/20/22 2252 -- -- -- -- -- 89 % Abnormal  -- -- --   10/20/22 2117 -- 86 -- 139/75 -- -- -- -- --   10/20/22 19:52:27 97 5 °F (36 4 °C) 82 20 108/66 80 87 % Abnormal  -- -- --   10/20/22 1800 -- 87 -- -- -- 90 % -- -- Nasal cannula   10/20/22 1745 -- 82 -- -- -- 88 % Abnormal  -- -- Nasal cannula   10/20/22 1730 -- 84 -- -- -- 90 % -- -- --   10/20/22 1516 -- 80 20 141/62 -- 89 % Abnormal  -- -- Nasal cannula   10/20/22 1245 -- 85 -- -- -- 86 % Abnormal  -- -- --   10/20/22 1230 98 2 °F (36 8 °C) 79 -- -- -- 89 % Abnormal  -- -- Nasal cannula   10/20/22 1100 -- 77 -- 151/71 102 90 % -- -- Nasal cannula   10/20/22 1000 -- 71 -- 137/64 92 92 % 28 2 L/min Nasal cannula   10/20/22 0638 -- 69 18 145/65 -- 90 % -- -- CPAP   10/20/22 0637 -- 68 -- 145/65 -- -- -- -- --   10/20/22 0000 -- 81 19 117/59 -- 92 % 32 3 L/min Nasal cannula   10/19/22 2116 -- 86 16 -- -- 96 % 28 2 L/min Nasal cannula   10/19/22 1833 -- 98 20 110/57 -- 96 % 36 4 L/min Nasal cannula   10/19/22 1825 -- 96 22 -- -- 97 % 32 3 L/min --   10/19/22 1500 -- -- -- -- -- 94 % -- -- --   10/19/22 1422 -- 90 20 115/62 -- 95 % -- -- None (Room air)   10/19/22 1401 99 1 °F (37 3 °C) 91 20 95/58 -- 85 % Abnormal  28 2 L/min Nasal cannula     Pertinent Labs/Diagnostic Test Results:   10/19 EKG:  NSR    XR chest 1 view portable   ED Interpretation by Alisha nAdrade PA-C (10/19 4498)   No acute cardiopulmonary process  Final Result by Bay Swain MD (10/20 7466)      Emphysema  Scarring or atelectasis at the left lung base                    Workstation performed: [id number]           Results from last 7 days   Lab Units 10/20/22  1531 10/15/22  1727   SARS-COV-2  Negative Negative     Results from last 7 days   Lab Units 10/21/22  0502 10/20/22  0407 10/19/22  1443 10/15/22  1727 10/14/22  1604   WBC Thousand/uL 14 95* 7 36 9 38 10 99* 8 35   HEMOGLOBIN g/dL 18 2* 16 4 18 2* 17 8* 18 1*   HEMATOCRIT % 56 8* 52 0* 57 5* 57 3* 57 4*   PLATELETS Thousands/uL 162 152 155 164 174   NEUTROS ABS Thousands/µL 12 92*  --  7 69* 8 48* 6 03         Results from last 7 days   Lab Units 10/21/22  0502 10/20/22  0407 10/19/22  1443 10/15/22  1727 10/14/22  1604   SODIUM mmol/L 137 137 137 142 139   POTASSIUM mmol/L 4 2 4 3 4 3 3 7 4 4   CHLORIDE mmol/L 98 97 98 100 105   CO2 mmol/L 33* 33* 30 37* 30   ANION GAP mmol/L 6 7 9 5 4   BUN mg/dL 37* 30* 27* 20 20   CREATININE mg/dL 1 23 1 34* 1 43* 1 26 1 42*   EGFR ml/min/1 73sq m 55 50 46 54 46   CALCIUM mg/dL 8 7 8 9 8 5 9 4 9 5     Results from last 7 days   Lab Units 10/19/22  1443   AST U/L 34   ALT U/L 33   ALK PHOS U/L 72   TOTAL PROTEIN g/dL 6 4   ALBUMIN g/dL 4 1   TOTAL BILIRUBIN mg/dL 0 74         Results from last 7 days   Lab Units 10/21/22  0502 10/20/22  0407 10/19/22  1443 10/15/22  1727   GLUCOSE RANDOM mg/dL 156* 163* 108 67     Results from last 7 days   Lab Units 10/19/22  1732 10/19/22  1443 10/15/22  2146 10/15/22  2010 10/15/22  1727   HS TNI 0HR ng/L  --  24  --   --  11   HS TNI 2HR ng/L 21  --   --  9  --    HSTNI D2 ng/L -3  --   --  -2  --    HS TNI 4HR ng/L  --   --  9  --   --    HSTNI D4 ng/L  --   --  -2  --   --          Results from last 7 days   Lab Units 10/15/22  1727   PROTIME seconds 13 4   INR  1 02   PTT seconds 26     Results from last 7 days   Lab Units 10/14/22  1604   TSH 3RD GENERATON uIU/mL 0 948     Results from last 7 days   Lab Units 10/20/22  0407 10/16/22  0532 10/15/22  1727   PROCALCITONIN ng/ml 0 15 0 10 0 11     Results from last 7 days   Lab Units 10/19/22  1443 10/15/22  1727   BNP pg/mL 97 46     Results from last 7 days Lab Units 10/20/22  1531 10/15/22  1727   INFLUENZA A PCR  Negative Negative   INFLUENZA B PCR  Negative Negative   RSV PCR  Positive* Negative     ED Treatment:   Medication Administration from 10/19/2022 1336 to 10/20/2022 0816       Date/Time Order Dose Route Action     10/19/2022 1449 methylPREDNISolone sodium succinate (Solu-MEDROL) injection 80 mg 80 mg Intravenous Given     10/19/2022 1449 ipratropium-albuterol (DUO-NEB) 0 5-2 5 mg/3 mL inhalation solution 3 mL 3 mL Nebulization Given     10/19/2022 1604 albuterol inhalation solution 10 mg 10 mg Nebulization Given     10/19/2022 1604 ipratropium (ATROVENT) 0 02 % inhalation solution 1 mg 1 mg Nebulization Given     10/19/2022 1604 sodium chloride 0 9 % inhalation solution 3 mL 3 mL Nebulization Given     10/19/2022 2031 albuterol (2 5 mg/3 mL) 0 083 % inhalation solution **ADS Override Pull**    Given     10/19/2022 2028 ipratropium (ATROVENT) 0 02 % inhalation solution 0 5 mg 0 5 mg Nebulization Given     10/19/2022 2028 levalbuterol (XOPENEX) inhalation solution 1 25 mg 1 25 mg Nebulization Given     10/20/2022 0634 methylPREDNISolone sodium succinate (Solu-MEDROL) injection 40 mg 40 mg Intravenous Given     10/19/2022 2252 methylPREDNISolone sodium succinate (Solu-MEDROL) injection 40 mg 40 mg Intravenous Given     10/19/2022 2252 doxycycline hyclate (VIBRAMYCIN) capsule 100 mg 100 mg Oral Given     10/20/2022 0634 heparin (porcine) subcutaneous injection 5,000 Units 5,000 Units Subcutaneous Given     10/19/2022 2252 heparin (porcine) subcutaneous injection 5,000 Units 5,000 Units Subcutaneous Given     10/20/2022 0000 famotidine (PEPCID) tablet 20 mg 20 mg Oral Given     10/20/2022 0000 gabapentin (NEURONTIN) capsule 300 mg 300 mg Oral Given     10/20/2022 0637 metoprolol succinate (TOPROL-XL) 24 hr tablet 75 mg 75 mg Oral Given     10/20/2022 0000 metoprolol succinate (TOPROL-XL) 24 hr tablet 50 mg 50 mg Oral Given     10/20/2022 0216 al Kevin Dickinson oxide-diphenhydramine-lidocaine viscous (MAGIC MOUTHWASH) suspension 10 mL 10 mL Swish & Swallow Given        Past Medical History:   Diagnosis Date   • Bilateral carotid artery stenosis    • Chronic diastolic heart failure (HCC)    • Chronic ischemic heart disease    • Colon polyp    • COPD (chronic obstructive pulmonary disease) (HCC)    • Coronary artery disease     hx stents, MI, PCI   • CPAP (continuous positive airway pressure) dependence    • Hearing loss    • Hyperlipidemia    • Hypertension    • MI (myocardial infarction) ([facility] ) 1995   • Myocardial infarction ([facility] )    • Pneumonia    • Prostate cancer ([facility] )    • S/P carotid endarterectomy    • Shortness of breath    • Sleep apnea    • Sleep apnea, obstructive    • Stented coronary artery      Present on Admission:  • Acute on chronic respiratory failure with hypoxia ([facility])  • Chronic diastolic (congestive) heart failure (HCC)  • Chronic obstructive pulmonary disease with acute exacerbation (HCC)  • Gastroesophageal reflux disease  • Hyperlipidemia  • Obstructive sleep apnea syndrome in adult  • Stage 3a chronic kidney disease (HCC)  • Hypertension      Admitting Diagnosis: SOB (shortness of breath) [R06 02]  Hypoxia [R09 02]  Chronic obstructive pulmonary disease with acute exacerbation (HCC) [E19 4]  Chronic diastolic (congestive) heart failure (HCC) [I50 32]  Age/Sex: 65 y o  male  Admission Orders:  Scheduled Medications:  aspirin, 81 mg, Oral, Daily  atorvastatin, 80 mg, Oral, Daily With Dinner  budesonide, 0 5 mg, Nebulization, Q12H  doxycycline hyclate, 100 mg, Oral, Q12H  famotidine, 20 mg, Oral, HS  fluticasone, 2 spray, Each Nare, Daily  furosemide, 40 mg, Oral, Daily  gabapentin, 300 mg, Oral, TID  heparin (porcine), 5,000 Units, Subcutaneous, Q8H SVETLANA  ipratropium, 0 5 mg, Nebulization, TID  levalbuterol, 1 25 mg, Nebulization, TID  methylPREDNISolone sodium succinate, 40 mg, Intravenous, Q8H  metoprolol succinate, 50 mg, Oral, HS  metoprolol succinate, 75 mg, Oral, Early Morning  multivitamin stress formula, 1 tablet, Oral, Daily  potassium chloride, 20 mEq, Oral, Daily      Continuous IV Infusions: none     PRN Meds:  acetaminophen, 650 mg, Oral, Q6H PRN  al mag oxide-diphenhydramine-lidocaine viscous, 10 mL, Swish & Swallow, Q4H PRN  guaiFENesin, 200 mg, Oral, Q4H PRN  nitroglycerin, 0 4 mg, Sublingual, Q5 Min PRN  phenol, 1 spray, Mouth/Throat, Q2H PRN      scd  IP CONSULT TO PULMONOLOGY  IP CONSULT TO CARDIOLOGY    Network Utilization Review Department  ATTENTION: Please call with any questions or concerns to [phone] and carefully listen to the prompts so that you are directed to the right person  All voicemails are confidential   Jaz Calix all requests for admission clinical reviews, approved or denied determinations and any other requests to dedicated fax number below belonging to the campus where the patient is receiving treatment   List of dedicated fax numbers for the Facilities:  [address] DENIALS (Administrative/Medical Necessity) [phone]   [address] (Maternity/NICU/Pediatrics) [phone]   [address] [phone]   [place] [address] [phone]   [facility] [address] [place] [facility] [address] [phone] [address] [place] [facility] [address] 8[address] [facility] [phone]

## 2022-10-20 NOTE — CONSULTS
[address] 1945, 65 y o  male MRN: [id number]  Unit/Bed#: ED 16 Encounter: [id number]  Primary Care Provider: Ina Rivera DO   Date and time admitted to hospital: 10/19/2022  2:05 PM    Inpatient consult to Cardiology  Consult performed by: INOCENTE Kenney  Consult ordered by: Pérez Ibanez MD          Acute on chronic respiratory failure with hypoxia [facility])  Assessment & Plan  Related to COPD exacerbation   Management per primary team    Stage 3a chronic kidney disease [facility])  Assessment & Plan  Lab Results   Component Value Date    EGFR 50 10/20/2022    EGFR 46 10/19/2022    EGFR 54 10/15/2022    CREATININE 1 34 (H) 10/20/2022    CREATININE 1 43 (H) 10/19/2022    CREATININE 1 26 10/15/2022     Monitor renal function on diuretic therapy     Chronic diastolic (congestive) heart failure (HCC)  Assessment & Plan  Wt Readings from Last 3 Encounters:   10/19/22 100 kg (221 lb)   10/18/22 100 kg (221 lb)   10/15/22 98 4 kg (217 lb)     Lasix 40 IV bid today, transition to home regimen tomorrow  Low Na diet, daily weights, I/O monitoring      Hypertension  Assessment & Plan  BP adequately controlled    * Chronic obstructive pulmonary disease with acute exacerbation (HCC)  Assessment & Plan  Management per primary service  Pulm c/s pending    Other summary comments:   Pt's shortness of breath is most likely of pulmonary cause  Recommend treatment per medical team and pulmonary  He does have mildly abnormal diastolic function likely as a result of his underlying lung disease  He does not examine to be significantly volume overloaded and BNP is normal   He is scheduled to receive IV lasix; would continue this for today and resume his usual home dosing of PO diuretics tomorrow  Continue low Na diet, I/O monitoring, daily weights  Remainder of care per primary service       Outpatient Cardiologist: Dr Laverle Lefort    HPI: Mitul Hurd is a 65y o  year old male who presented with worsening shortness of breath  He was recently seen in the ER with similar symptoms and diagnosed with COPD exacerbation  He was  prescribed oral antibiotics and prednisone  He returned for continued symptoms despite treatment  In the emergency department, he was noted to have respiratory insufficiency with an O2 sat of 85% on room air  He has been given IV steroids and breathing treatments per Medicine with a consult to Pulmonary for further management  He is also felt of having a mild exacerbation of chronic diastolic dysfunction and has been prescribed IV Lasix  Cardiology was consulted for further input  At the time of my evaluation, Angela Johnston is resting in bed  He reports shortness of breath with minimal exertion  He denies concerning cardiac symptoms of chest pain, pressure, palpitations  He reports compliance with his home medications and follows a low Na diet  Cardiac history is significant for CAD with multiple prior interventions-MI in 1995 with PCI to RCA, stenting of the RCA in 1999 and 2001, stenting of the LAD in 2003  He has a mild reduction in LV systolic function which has remained stable  He is maintained on beta-blocker therapy for significant PVC burden identified on Holter monitor  He is treated for hypertension and hyperlipidemia  Of note, he was recently diagnosed with stage II prostate cancer with plans for radiation therapy  EKG:  Sinus rhythm nonspecific ST T wave changes      MOST  RECENT CARDIAC IMAGING:   Echo 09/29/2022 EF 83%, mild diastolic dysfunction  Mild AI, AS  Mild to moderate MR  No significant changes compared to prior study 01/14/2022    Echo 01/14/2022 EF 14-67%, mild diastolic dysfunction  Mild to moderate AI, mild AS  Mild to moderate MR  Mild TR    Holter monitor 02/24/2022 predominantly sinus rhythm, average heart rate 74  Frequent ventricular ectopy accounting for 5 2% of total monitored beats   One episode of nonsustained ventricular tachycardia lasting 9 beats  No significant pauses or advanced degree heart block    Cardiac catheterization 03/18/2021  --  Left main: Angiography showed no evidence of disease  --  Proximal LAD: There was a 10 % stenosis at the site of a prior stent  --  Circumflex: Angiography showed minor luminal irregularities  --  Mid RCA: There was a 50 % stenosis at the site of a prior stent  --  3rd posterolateral segment: Angiography showed diffuse disease  The distal vessel was supplied by collaterals from the distal circumflex  No significant CAD requiring revascularization  Tiny branch of RPL 3 collateralized  Review of Systems: a 10 point review of systems was conducted and is negative except for as mentioned in the HPI or as below  Historical Information   Past Medical History:   Diagnosis Date   • Bilateral carotid artery stenosis    • Chronic diastolic heart failure (HCC)    • Chronic ischemic heart disease    • Colon polyp    • COPD (chronic obstructive pulmonary disease) ([facility])    • Coronary artery disease     hx stents, MI, PCI   • CPAP (continuous positive airway pressure) dependence    • Hearing loss    • Hyperlipidemia    • Hypertension    • MI (myocardial infarction) ([facility] ) 1995   • Myocardial infarction ([facility] )    • Pneumonia    • Prostate cancer ([facility] )    • S/P carotid endarterectomy    • Shortness of breath    • Sleep apnea    • Sleep apnea, obstructive    • Stented coronary artery      Past Surgical History:   Procedure Laterality Date   • APPENDECTOMY     • CARDIAC CATHETERIZATION  03/18/2021    left main with no significant disease, proximal LAD with 10% stenosis at the site of prior stent, left circumflex artery with minimal luminal irregularities mid RCA with 50% stenosis at site of prior stent and PL segment with distal disease supplied by collaterals from the distal circumflex with no significant CAD requiring revascularization at that time     • CATARACT EXTRACTION, BILATERAL Bilateral    • COLONOSCOPY     • CORONARY ANGIOPLASTY WITH STENT PLACEMENT      RCA   • CORONARY ANGIOPLASTY WITH STENT PLACEMENT      RCA   • CORONARY ANGIOPLASTY WITH STENT PLACEMENT      LAD   • EYE SURGERY     • KY BIOPSY OF PROSTATE,NEEDLE,TRANSPERINEAL N/A 2022    Procedure: TRANSPERINEAL MRI FUSION  BIOPSY PROSTATE;  Surgeon: Dillan England MD;  Location: [facility];  Service: Urology   • KY REVISE MEDIAN N/CARPAL TUNNEL SURG Left 2022    Procedure: RELEASE CARPAL TUNNEL;  Surgeon: Marleny Pro MD;  Location: [facility];  Service: Orthopedics   • KY REVISE ULNAR NERVE AT ELBOW Left 2022    Procedure: Duque Generous;  Surgeon: Marleny Pro MD;  [facility];  Service: Orthopedics   • KY REVISE ULNAR NERVE AT WRIST Left 2022    Procedure: Florencio Hernandesison;  Surgeon: Marleny Pro MD;  Location: [facility];  Service: Orthopedics   • SKIN CANCER EXCISION      chin-per pt, basal cell  also right ankle     Social History     Substance and Sexual Activity   Alcohol Use Not Currently     Social History     Substance and Sexual Activity   Drug Use Never     Social History     Tobacco Use   Smoking Status Former Smoker   • Packs/day: 2 00   • Years: 35 00   • Pack years: 70 00   • Quit date: 1995   • Years since quittin 3   Smokeless Tobacco Never Used       Family History: no longer relevant due to pt age      Meds/Allergies   all current active meds have been reviewed  (Not in a hospital admission)      Allergies   Allergen Reactions   • Lisinopril Swelling and Cough   • Tetanus Antitoxin Anaphylaxis   • Tetanus Toxoid Anaphylaxis and Swelling       Objective   Vitals: Blood pressure 137/64, pulse 71, temperature 99 1 °F (37 3 °C), temperature source Tympanic, resp  rate 18, height 5' 8" (1 727 m), weight 100 kg (221 lb), SpO2 92 %  , Body mass index is 33 6 kg/m² ,   Orthostatic Blood Pressures    Flowsheet Row Most Recent Value   Blood Pressure 137/64 filed at 10/20/2022 1000   Patient Position - Orthostatic VS Lying filed at 10/20/2022 5657          Systolic (26DGQ), DME:312 , Min:95 , LFJ:141     Diastolic (08FFH), NXO:27, Min:57, Max:65      Physical Exam:    General:  Normal appearance in no distress  Eyes:  Anicteric  Oral mucosa:  Moist   Neck:  No JVD  Carotid upstrokes are brisk without bruits  No masses  Chest:  Decreased throughout with scattered wheezes  Cardiac:  No palpable PMI  Normal S1 and S2  No murmur gallop or rub  Abdomen:  Soft and nontender  No palpable organomegaly or aortic enlargement  Extremities:  Trace bilat LE edema  Musculoskeletal:  Symmetric  Vascular:  Pedal pulses are intact  Neuro:  Grossly symmetric  Psych:  Alert and oriented x3          Lab Results:     Troponins:    Results from last 7 days   Lab Units 10/19/22  1732 10/19/22  1443 10/15/22  2146   HS TNI 0HR ng/L  --  24  --    HS TNI 2HR ng/L 21  --   --    HSTNI D2 ng/L -3  --   --    HS TNI 4HR ng/L  --   --  9   HSTNI D4 ng/L  --   --  -2     BNP:   Results from last 6 Months   Lab Units 10/19/22  1443 10/15/22  1727   BNP pg/mL 97 46       CBC :   Results from last 7 days   Lab Units 10/20/22  0407 10/19/22  1443   WBC Thousand/uL 7 36 9 38   HEMOGLOBIN g/dL 16 4 18 2*   HEMATOCRIT % 52 0* 57 5*   MCV fL 101* 101*   PLATELETS Thousands/uL 152 155     TSH:     CMP:   Results from last 7 days   Lab Units 10/20/22  0407 10/19/22  1443   POTASSIUM mmol/L 4 3 4 3   CHLORIDE mmol/L 97 98   CO2 mmol/L 33* 30   BUN mg/dL 30* 27*   CREATININE mg/dL 1 34* 1 43*   AST U/L  --  34   ALT U/L  --  33   EGFR ml/min/1 73sq m 50 46     Lipid Profile:     Coags:   Results from last 7 days   Lab Units 10/15/22  1727   INR  1 02

## 2022-10-20 NOTE — ASSESSMENT & PLAN NOTE
· Secondary to COPD exacerbation being treated as per above  · Patient normally requires 2 L q h s  and as needed during the day  · Is now requiring 3 L and had previously been up as high as 4 L

## 2022-10-21 PROBLEM — B33.8 RSV (RESPIRATORY SYNCYTIAL VIRUS INFECTION): Status: ACTIVE | Noted: 2022-10-21

## 2022-10-21 LAB
ANION GAP SERPL CALCULATED.3IONS-SCNC: 6 MMOL/L (ref 4–13)
BASOPHILS # BLD AUTO: 0.03 THOUSANDS/ÂΜL (ref 0–0.1)
BASOPHILS NFR BLD AUTO: 0 % (ref 0–1)
BUN SERPL-MCNC: 37 MG/DL (ref 5–25)
CALCIUM SERPL-MCNC: 8.7 MG/DL (ref 8.4–10.2)
CHLORIDE SERPL-SCNC: 98 MMOL/L (ref 96–108)
CO2 SERPL-SCNC: 33 MMOL/L (ref 21–32)
CREAT SERPL-MCNC: 1.23 MG/DL (ref 0.6–1.3)
EOSINOPHIL # BLD AUTO: 0 THOUSAND/ÂΜL (ref 0–0.61)
EOSINOPHIL NFR BLD AUTO: 0 % (ref 0–6)
ERYTHROCYTE [DISTWIDTH] IN BLOOD BY AUTOMATED COUNT: 14.3 % (ref 11.6–15.1)
GFR SERPL CREATININE-BSD FRML MDRD: 55 ML/MIN/1.73SQ M
GLUCOSE SERPL-MCNC: 156 MG/DL (ref 65–140)
HCT VFR BLD AUTO: 56.8 % (ref 36.5–49.3)
HGB BLD-MCNC: 18.2 G/DL (ref 12–17)
IMM GRANULOCYTES # BLD AUTO: 0.12 THOUSAND/UL (ref 0–0.2)
IMM GRANULOCYTES NFR BLD AUTO: 1 % (ref 0–2)
LYMPHOCYTES # BLD AUTO: 0.48 THOUSANDS/ÂΜL (ref 0.6–4.47)
LYMPHOCYTES NFR BLD AUTO: 3 % (ref 14–44)
MCH RBC QN AUTO: 31.7 PG (ref 26.8–34.3)
MCHC RBC AUTO-ENTMCNC: 32 G/DL (ref 31.4–37.4)
MCV RBC AUTO: 99 FL (ref 82–98)
MONOCYTES # BLD AUTO: 1.4 THOUSAND/ÂΜL (ref 0.17–1.22)
MONOCYTES NFR BLD AUTO: 9 % (ref 4–12)
NEUTROPHILS # BLD AUTO: 12.92 THOUSANDS/ÂΜL (ref 1.85–7.62)
NEUTS SEG NFR BLD AUTO: 87 % (ref 43–75)
NRBC BLD AUTO-RTO: 0 /100 WBCS
PLATELET # BLD AUTO: 162 THOUSANDS/UL (ref 149–390)
PMV BLD AUTO: 10.5 FL (ref 8.9–12.7)
POTASSIUM SERPL-SCNC: 4.2 MMOL/L (ref 3.5–5.3)
RBC # BLD AUTO: 5.74 MILLION/UL (ref 3.88–5.62)
SODIUM SERPL-SCNC: 137 MMOL/L (ref 135–147)
WBC # BLD AUTO: 14.95 THOUSAND/UL (ref 4.31–10.16)

## 2022-10-21 PROCEDURE — 94640 AIRWAY INHALATION TREATMENT: CPT

## 2022-10-21 PROCEDURE — 85025 COMPLETE CBC W/AUTO DIFF WBC: CPT | Performed by: HOSPITALIST

## 2022-10-21 PROCEDURE — 99233 SBSQ HOSP IP/OBS HIGH 50: CPT

## 2022-10-21 PROCEDURE — 99232 SBSQ HOSP IP/OBS MODERATE 35: CPT | Performed by: INTERNAL MEDICINE

## 2022-10-21 PROCEDURE — 80048 BASIC METABOLIC PNL TOTAL CA: CPT | Performed by: HOSPITALIST

## 2022-10-21 PROCEDURE — 94760 N-INVAS EAR/PLS OXIMETRY 1: CPT

## 2022-10-21 RX ORDER — FUROSEMIDE 40 MG/1
40 TABLET ORAL DAILY
Status: DISCONTINUED | OUTPATIENT
Start: 2022-10-21 | End: 2022-10-25 | Stop reason: HOSPADM

## 2022-10-21 RX ADMIN — LIDOCAINE HYDROCHLORIDE 10 ML: 20 SOLUTION ORAL; TOPICAL at 18:13

## 2022-10-21 RX ADMIN — METHYLPREDNISOLONE SODIUM SUCCINATE 40 MG: 40 INJECTION, POWDER, FOR SOLUTION INTRAMUSCULAR; INTRAVENOUS at 05:07

## 2022-10-21 RX ADMIN — METHYLPREDNISOLONE SODIUM SUCCINATE 40 MG: 40 INJECTION, POWDER, FOR SOLUTION INTRAMUSCULAR; INTRAVENOUS at 22:17

## 2022-10-21 RX ADMIN — ASPIRIN 81 MG 81 MG: 81 TABLET ORAL at 09:38

## 2022-10-21 RX ADMIN — BUDESONIDE 0.5 MG: 0.5 INHALANT ORAL at 07:43

## 2022-10-21 RX ADMIN — Medication 1 SPRAY: at 15:08

## 2022-10-21 RX ADMIN — ATORVASTATIN CALCIUM 80 MG: 80 TABLET, FILM COATED ORAL at 18:13

## 2022-10-21 RX ADMIN — LEVALBUTEROL HYDROCHLORIDE 1.25 MG: 1.25 SOLUTION, CONCENTRATE RESPIRATORY (INHALATION) at 07:43

## 2022-10-21 RX ADMIN — FUROSEMIDE 40 MG: 40 TABLET ORAL at 09:37

## 2022-10-21 RX ADMIN — Medication 1 SPRAY: at 05:07

## 2022-10-21 RX ADMIN — GABAPENTIN 300 MG: 300 CAPSULE ORAL at 22:16

## 2022-10-21 RX ADMIN — FLUTICASONE PROPIONATE 2 SPRAY: 50 SPRAY, METERED NASAL at 09:38

## 2022-10-21 RX ADMIN — POTASSIUM CHLORIDE 20 MEQ: 750 TABLET, EXTENDED RELEASE ORAL at 09:37

## 2022-10-21 RX ADMIN — GABAPENTIN 300 MG: 300 CAPSULE ORAL at 18:13

## 2022-10-21 RX ADMIN — METOPROLOL SUCCINATE 50 MG: 50 TABLET, EXTENDED RELEASE ORAL at 22:16

## 2022-10-21 RX ADMIN — DOXYCYCLINE 100 MG: 100 CAPSULE ORAL at 09:38

## 2022-10-21 RX ADMIN — IPRATROPIUM BROMIDE 0.5 MG: 0.5 SOLUTION RESPIRATORY (INHALATION) at 14:22

## 2022-10-21 RX ADMIN — FAMOTIDINE 20 MG: 20 TABLET, FILM COATED ORAL at 22:16

## 2022-10-21 RX ADMIN — LEVALBUTEROL HYDROCHLORIDE 1.25 MG: 1.25 SOLUTION, CONCENTRATE RESPIRATORY (INHALATION) at 14:22

## 2022-10-21 RX ADMIN — HEPARIN SODIUM 5000 UNITS: 5000 INJECTION INTRAVENOUS; SUBCUTANEOUS at 14:59

## 2022-10-21 RX ADMIN — GABAPENTIN 300 MG: 300 CAPSULE ORAL at 09:38

## 2022-10-21 RX ADMIN — HEPARIN SODIUM 5000 UNITS: 5000 INJECTION INTRAVENOUS; SUBCUTANEOUS at 05:07

## 2022-10-21 RX ADMIN — METHYLPREDNISOLONE SODIUM SUCCINATE 40 MG: 40 INJECTION, POWDER, FOR SOLUTION INTRAMUSCULAR; INTRAVENOUS at 14:59

## 2022-10-21 RX ADMIN — HEPARIN SODIUM 5000 UNITS: 5000 INJECTION INTRAVENOUS; SUBCUTANEOUS at 22:17

## 2022-10-21 RX ADMIN — Medication 1 SPRAY: at 22:26

## 2022-10-21 RX ADMIN — DOXYCYCLINE 100 MG: 100 CAPSULE ORAL at 22:16

## 2022-10-21 RX ADMIN — IPRATROPIUM BROMIDE 0.5 MG: 0.5 SOLUTION RESPIRATORY (INHALATION) at 07:43

## 2022-10-21 RX ADMIN — METOPROLOL SUCCINATE 75 MG: 50 TABLET, EXTENDED RELEASE ORAL at 05:07

## 2022-10-21 NOTE — ASSESSMENT & PLAN NOTE
· Secondary to COPD exacerbation being treated as per above  · Patient normally requires 2 L q h s  and as needed during the day  · Is now requiring 3 L and had previously been up as high as 4 L  · Continue to titrate down as tolerated

## 2022-10-21 NOTE — ASSESSMENT & PLAN NOTE
· Tested positive for RSV 10/20  · Currently on treatment protocol for COPD exacerbation  · Further supportive care  · Per pulmonology, patient will need long steroid taper at discharge  · Please see related assessment/plan under COPD exacerbation

## 2022-10-21 NOTE — ASSESSMENT & PLAN NOTE
Lab Results   Component Value Date    EGFR 55 10/21/2022    EGFR 50 10/20/2022    EGFR 46 10/19/2022    CREATININE 1 23 10/21/2022    CREATININE 1 34 (H) 10/20/2022    CREATININE 1 43 (H) 10/19/2022     · Creatinine appears to be approximately baseline  · Slight improvement with IV diuresis  · Will continue to monitor with repeat labs in a m

## 2022-10-21 NOTE — ASSESSMENT & PLAN NOTE
Wt Readings from Last 3 Encounters:   10/19/22 100 kg (221 lb)   10/18/22 100 kg (221 lb)   10/15/22 98 4 kg (217 lb)     · Does not appear to be in exacerbation at this time, although received IV Lasix 40 mg x2 doses for suspected volume overload  · Examines euvolemic  · Restart home po Lasix 40 mg qd  · Continue pre-hospital Toprol XL 75 mg p o  daily and 50 mg p o  q h s    · Daily weights, I&O's, 2g sodium restricted diet   · Place on no added salt diet

## 2022-10-21 NOTE — PROGRESS NOTES
Progress Note - Pulmonary   Ritu Rome 65 y o  male MRN: [id number]  Unit/Bed#: -01 Encounter: [id number]      Assessment:  1  Actue on chronic hypoxic/hypercapnic respiratory failure  2  RSV bronchitis   3  COPD/emphysema   4  Chronic systolic/diastolic HF  5  LAMBERTO     Plan:  · Continue supportive care for RSV bronchitis, seems to be the exacerbating factor for ongoing symptoms  · Continue Solu-Medrol 40 Q 8 until 5 days total of high-dose steroids in start tapering  · Continue doxycycline 100 mg b i d  for COPD exacerbation  · Nebulized only treatment budesonide q 12, Atrovent/Xopenex t i d  · Wean FiO2 for SpO2 above 88%  · Needs follow-up with Pulmonary to discuss pulmonary rehab option after discharge    Subjective:   No overnight acute events  Still dyspnea on minimal exertion wheezing and some chest congestion     Vitals: Blood pressure 117/73, pulse 71, temperature (!) 96 6 °F (35 9 °C), resp  rate 16, height 5' 8" (1 727 m), weight 100 kg (221 lb), SpO2 (!) 88 % , Body mass index is 33 6 kg/m²  Intake/Output Summary (Last 24 hours) at 10/21/2022 1520  Last data filed at 10/21/2022 0746  Gross per 24 hour   Intake 120 ml   Output --   Net 120 ml       Physical Exam  Gen: not in acute distress, obese Body mass index is 33 6 kg/m²  HEENT:supple, no accessory muscle use, no JVD appreciated  Cardiac: RRR, no murmurs appreciated  Lungs: normal respiratory effort, diffusely diminished bilateral noted bilateral end expiratory wheeze  Abdomen: soft, nontender, normoactive bowel sounds  Extremities: no cyanosis, no clubbing, 1+ pitting below knees edema  Neuro: AAO X3, no focal motor deficit    Labs: I have personally reviewed pertinent lab results          Giovana Nunez

## 2022-10-21 NOTE — ASSESSMENT & PLAN NOTE
Baseline o2 requirements include 2L NC qhs and intermittently prn throughout the day   · AEB increased O2 requirements due to underlying RSV   · Hold pre-hospital Breo   · Continue Xopenex and Atrovent q 8 hours, Pulmicort 0 5 mg q12 hours   · Continue Solu-Medrol 40 mg IV q 8 hours  Will need slow po taper in the setting of RSV     · Doxycycline day 2/5   · Placed on O2 and respiratory protocol  · Pulmonology consult appreciated   · Currently on 3L via NC  · Goal to titrate O2 down to baseline requirements and plan for DC home on long steroid taper

## 2022-10-21 NOTE — PROGRESS NOTES
Martina U  65   Progress Note - Richar Collins 1945, 65 y o  male MRN: [id number]  Unit/Bed#: -01 Encounter: [id number]  Primary Care Provider: Alex Rodriguez DO   Date and time admitted to hospital: 10/19/2022  2:05 PM    * [facility] pulmonary disease with [facility]ation [facility]  Assessment & Plan  Baseline o2 requirements include 2L NC qhs and intermittently prn throughout the day   · AEB increased O2 requirements due to underlying RSV   · Hold pre-hospital Breo   · Continue Xopenex and Atrovent q 8 hours, Pulmicort 0 5 mg q12 hours   · Continue Solu-Medrol 40 mg IV q 8 hours  Will need slow po taper in the setting of RSV  · Doxycycline day 2/5   · Placed on O2 and respiratory protocol  · Pulmonology consult appreciated   · Currently on 3L via NC  · Goal to titrate O2 down to baseline requirements and plan for DC home on long steroid taper     RSV (respiratory syncytial virus infection)  Assessment & Plan  · Tested positive for RSV 10/20  · Currently on treatment protocol for COPD exacerbation  · Further supportive care  · Per pulmonology, patient will need long steroid taper at discharge  · Please see related assessment/plan under COPD exacerbation    Acute on chronic respiratory failure with hypoxia ([place] [address] )  Assessment & Plan  · Secondary to COPD exacerbation being treated as per above  · Patient normally requires 2 L q h s  and as needed during the day  · Is now requiring 3 L and had previously been up as high as 4 L  · Continue to titrate down as tolerated    Obstructive sleep apnea syndrome in adult  Assessment & Plan  · Continue pre-hospital CPAP q h s      Stage 3a chronic kidney disease [facility])  Assessment & Plan  Lab Results   Component Value Date    EGFR 55 10/21/2022    EGFR 50 10/20/2022    EGFR 46 10/19/2022    CREATININE 1 23 10/21/2022    CREATININE 1 34 (H) 10/20/2022    CREATININE 1 43 (H) 10/19/2022     · Creatinine appears to be approximately baseline  · Slight improvement with IV diuresis  · Will continue to monitor with repeat labs in a m  Gastroesophageal reflux disease  Assessment & Plan  · Continue pre-hospital Pepcid 20 mg p o  q h s  Chronic diastolic (congestive) heart failure (HCC)  Assessment & Plan  Wt Readings from Last 3 Encounters:   10/19/22 100 kg (221 lb)   10/18/22 100 kg (221 lb)   10/15/22 98 4 kg (217 lb)     · Does not appear to be in exacerbation at this time, although received IV Lasix 40 mg x2 doses for suspected volume overload  · Examines euvolemic  · Restart home po Lasix 40 mg qd  · Continue pre-hospital Toprol XL 75 mg p o  daily and 50 mg p o  q h s  · Daily weights, I&O's, 2g sodium restricted diet   · Place on no added salt diet        Hypertension  Assessment & Plan  · Continue pre-hospital Toprol 75 mg p o  q a m  and 50 mg p o  q h s  and Lasix 40 mg p o  daily    Hyperlipidemia  Assessment & Plan  · Substitute Lipitor 80 mg p o  daily for pre-hospital Crestor        VTE Pharmacologic Prophylaxis: VTE Score: 10 High Risk (Score >/= 5) - Pharmacological DVT Prophylaxis Ordered: heparin  Sequential Compression Devices Ordered  Patient Centered Rounds: I performed bedside rounds with nursing staff today  Discussions with Specialists or Other Care Team Provider:  Case Management, nursing, pulmonology    Education and Discussions with Family / Patient: Patient declined call to   Time Spent for Care: 30 minutes  More than 50% of total time spent on counseling and coordination of care as described above  Current Length of Stay: 1 day(s)  Current Patient Status: Inpatient   Certification Statement: The patient will continue to require additional inpatient hospital stay due to COPD exacerbation requiring increased supplemental O2  Discharge Plan: Anticipate discharge in 24-48 hrs to home      Code Status: Level 1 - Full Code    Subjective:   Patient seen and examined resting comfortably in bedside chair, in no apparent distress  No acute events overnight  Patient states that he is feeling somewhat better today  Objective:     Vitals:   Temp (24hrs), Av 2 °F (36 2 °C), Min:96 6 °F (35 9 °C), Max:97 5 °F (36 4 °C)    Temp:  [96 6 °F (35 9 °C)-97 5 °F (36 4 °C)] 96 6 °F (35 9 °C)  HR:  [71-87] 71  Resp:  [16-20] 16  BP: (108-141)/(62-86) 117/73  SpO2:  [87 %-94 %] 91 %  Body mass index is 33 6 kg/m²  Input and Output Summary (last 24 hours): Intake/Output Summary (Last 24 hours) at 10/21/2022 1311  Last data filed at 10/21/2022 0746  Gross per 24 hour   Intake 600 ml   Output 400 ml   Net 200 ml       Physical Exam:   Physical Exam  Vitals and nursing note reviewed  Constitutional:       General: He is not in acute distress  Appearance: He is overweight  He is not toxic-appearing  Interventions: Nasal cannula in place  HENT:      Head: Normocephalic and atraumatic  Mouth/Throat:      Mouth: Mucous membranes are moist    Eyes:      Conjunctiva/sclera: Conjunctivae normal    Cardiovascular:      Rate and Rhythm: Normal rate and regular rhythm  Pulses: Normal pulses  Heart sounds: Normal heart sounds  Pulmonary:      Effort: Pulmonary effort is normal  No respiratory distress  Breath sounds: Wheezing present  No rhonchi or rales  Abdominal:      General: Abdomen is protuberant  Bowel sounds are normal  There is no distension  Palpations: Abdomen is soft  Tenderness: There is no abdominal tenderness  Musculoskeletal:      Cervical back: Neck supple  Right lower leg: No edema  Left lower leg: No edema  Skin:     General: Skin is warm and dry  Neurological:      Mental Status: He is alert and oriented to person, place, and time  Cranial Nerves: No cranial nerve deficit  Sensory: No sensory deficit  Motor: No weakness        Coordination: Coordination normal    Psychiatric:         Mood and Affect: Mood normal  Behavior: Behavior normal          Thought Content: Thought content normal           Additional Data:     Labs:  Results from last 7 days   Lab Units 10/21/22  0502   WBC Thousand/uL 14 95*   HEMOGLOBIN g/dL 18 2*   HEMATOCRIT % 56 8*   PLATELETS Thousands/uL 162   NEUTROS PCT % 87*   LYMPHS PCT % 3*   MONOS PCT % 9   EOS PCT % 0     Results from last 7 days   Lab Units 10/21/22  0502 10/20/22  0407 10/19/22  1443   SODIUM mmol/L 137   < > 137   POTASSIUM mmol/L 4 2   < > 4 3   CHLORIDE mmol/L 98   < > 98   CO2 mmol/L 33*   < > 30   BUN mg/dL 37*   < > 27*   CREATININE mg/dL 1 23   < > 1 43*   ANION GAP mmol/L 6   < > 9   CALCIUM mg/dL 8 7   < > 8 5   ALBUMIN g/dL  --   --  4 1   TOTAL BILIRUBIN mg/dL  --   --  0 74   ALK PHOS U/L  --   --  72   ALT U/L  --   --  33   AST U/L  --   --  34   GLUCOSE RANDOM mg/dL 156*   < > 108    < > = values in this interval not displayed       Results from last 7 days   Lab Units 10/15/22  1727   INR  1 02             Results from last 7 days   Lab Units 10/20/22  0407 10/16/22  0532 10/15/22  1727   PROCALCITONIN ng/ml 0 15 0 10 0 11       Lines/Drains:  Invasive Devices  Report    Peripheral Intravenous Line  Duration           Peripheral IV 10/19/22 Distal;Right;Upper;Ventral (anterior) Arm 1 day                      Imaging: Reviewed radiology reports from this admission including: chest xray    Recent Cultures (last 7 days):         Last 24 Hours Medication List:   Current Facility-Administered Medications   Medication Dose Route Frequency Provider Last Rate   • acetaminophen  650 mg Oral Q6H PRN Garrett Parker PA-C     • al mag oxide-diphenhydramine-lidocaine viscous  10 mL Swish & Swallow Q4H PRN Garrett Parker PA-C     • aspirin  81 mg Oral Daily Garrett Parker PA-C     • atorvastatin  80 mg Oral Daily With Shanna Spann PA-C     • budesonide  0 5 mg Nebulization Q12H Giovana Nunez MD     • doxycycline hyclate  100 mg Oral Q12H Garrett Parker PA-C     • famotidine  20 mg Oral HS Prashant Daley PA-C     • fluticasone  2 spray Each Nare Daily Prashant Daley PA-C     • furosemide  40 mg Oral Daily Golden Flores PA-C     • gabapentin  300 mg Oral TID Prashant Daley PA-C     • guaiFENesin  200 mg Oral Q4H PRN Prashant Daley PA-C     • heparin (porcine)  5,000 Units Subcutaneous [facility] Prashant Daley PA-C     • ipratropium  0 5 mg Nebulization TID [place]en Betters, DO     • levalbuterol  1 25 mg Nebulization TID [place]en Betters, DO     • methylPREDNISolone sodium succinate  40 mg Intravenous Q8H Prashant Daley PA-C     • metoprolol succinate  50 mg Oral HS Prashant Daley PA-C     • metoprolol succinate  75 mg Oral Early Morning Prashant Daley PA-C     • multivitamin stress formula  1 tablet Oral Daily Prashant Daley PA-C     • nitroglycerin  0 4 mg Sublingual Q5 Min PRN Prashant Daley PA-C     • phenol  1 spray Mouth/Throat Q2H PRN Ewa An MD     • potassium chloride  20 mEq Oral Daily Prashant Daley PA-C          Today, Patient Was Seen By: Golden Flores    **Please Note: This note may have been constructed using a voice recognition system  **

## 2022-10-21 NOTE — PLAN OF CARE
Problem: PAIN - ADULT  Goal: Verbalizes/displays adequate comfort level or baseline comfort level  Description: Interventions:  - Encourage patient to monitor pain and request assistance  - Assess pain using appropriate pain scale  - Administer analgesics based on type and severity of pain and evaluate response  - Implement non-pharmacological measures as appropriate and evaluate response  - Consider cultural and social influences on pain and pain management  - Notify physician/advanced practitioner if interventions unsuccessful or patient reports new pain  Outcome: Progressing     Problem: INFECTION - ADULT  Goal: Absence or prevention of progression during hospitalization  Description: INTERVENTIONS:  - Assess and monitor for signs and symptoms of infection  - Monitor lab/diagnostic results  - Monitor all insertion sites, i e  indwelling lines, tubes, and drains  - Monitor endotracheal if appropriate and nasal secretions for changes in amount and color  - [place] appropriate cooling/warming therapies per order  - Administer medications as ordered  - Instruct and encourage patient and family to use good hand hygiene technique  - Identify and instruct in appropriate isolation precautions for identified infection/condition  Outcome: Progressing  Goal: Absence of fever/infection during neutropenic period  Description: INTERVENTIONS:  - Monitor WBC    Outcome: Progressing     Problem: SAFETY ADULT  Goal: Patient will remain free of falls  Description: INTERVENTIONS:  - Educate patient/family on patient safety including physical limitations  - Instruct patient to call for assistance with activity   - Consult OT/PT to assist with strengthening/mobility   - Keep Call bell within reach  - Keep bed low and locked with side rails adjusted as appropriate  - Keep care items and personal belongings within reach  - Initiate and maintain comfort rounds  - Make Fall Risk Sign visible to staff  - Offer Toileting every 2 Hours, in advance of need  - Initiate/Maintain bed alarm  - Obtain necessary fall risk management equipment  - Apply yellow socks and bracelet for high fall risk patients  - Consider moving patient to room near nurses station  Outcome: Progressing  Goal: Maintain or return to baseline ADL function  Description: INTERVENTIONS:  -  Assess patient's ability to carry out ADLs; assess patient's baseline for ADL function and identify physical deficits which impact ability to perform ADLs (bathing, care of mouth/teeth, toileting, grooming, dressing, etc )  - Assess/evaluate cause of self-care deficits   - Assess range of motion  - Assess patient's mobility; develop plan if impaired  - Assess patient's need for assistive devices and provide as appropriate  - Encourage maximum independence but intervene and supervise when necessary  - Involve family in performance of ADLs  - Assess for home care needs following discharge   - Consider OT consult to assist with ADL evaluation and planning for discharge  - Provide patient education as appropriate  Outcome: Progressing  Goal: Maintains/Returns to pre admission functional level  Description: INTERVENTIONS:  - Perform BMAT or MOVE assessment daily    - Set and communicate daily mobility goal to care team and patient/family/caregiver  - Collaborate with rehabilitation services on mobility goals if consulted  - Perform Range of Motion 3 times a day  - Reposition patient every 2 hours    - Dangle patient 3 times a day  - Stand patient 3 times a day  - Ambulate patient 3 times a day  - Out of bed to chair 3 times a day   - Out of bed for meals 3 times a day  - Out of bed for toileting  - Record patient progress and toleration of activity level   Outcome: Progressing     Problem: DISCHARGE PLANNING  Goal: Discharge to home or other facility with appropriate resources  Description: INTERVENTIONS:  - Identify barriers to discharge w/patient and caregiver  - Arrange for needed discharge resources and transportation as appropriate  - Identify discharge learning needs (meds, wound care, etc )  - Arrange for interpretive services to assist at discharge as needed  - Refer to Case Management Department for coordinating discharge planning if the patient needs post-hospital services based on physician/advanced practitioner order or complex needs related to functional status, cognitive ability, or social support system  Outcome: Progressing     Problem: Knowledge Deficit  Goal: Patient/family/caregiver demonstrates understanding of disease process, treatment plan, medications, and discharge instructions  Description: Complete learning assessment and assess knowledge base    Interventions:  - Provide teaching at level of understanding  - Provide teaching via preferred learning methods  Outcome: Progressing

## 2022-10-22 LAB
ANION GAP SERPL CALCULATED.3IONS-SCNC: 7 MMOL/L (ref 4–13)
BUN SERPL-MCNC: 40 MG/DL (ref 5–25)
CALCIUM SERPL-MCNC: 8.1 MG/DL (ref 8.4–10.2)
CHLORIDE SERPL-SCNC: 97 MMOL/L (ref 96–108)
CO2 SERPL-SCNC: 33 MMOL/L (ref 21–32)
CREAT SERPL-MCNC: 1.23 MG/DL (ref 0.6–1.3)
ERYTHROCYTE [DISTWIDTH] IN BLOOD BY AUTOMATED COUNT: 14.4 % (ref 11.6–15.1)
GFR SERPL CREATININE-BSD FRML MDRD: 55 ML/MIN/1.73SQ M
GLUCOSE SERPL-MCNC: 161 MG/DL (ref 65–140)
HCT VFR BLD AUTO: 56.5 % (ref 36.5–49.3)
HGB BLD-MCNC: 18.4 G/DL (ref 12–17)
MCH RBC QN AUTO: 32.7 PG (ref 26.8–34.3)
MCHC RBC AUTO-ENTMCNC: 32.6 G/DL (ref 31.4–37.4)
MCV RBC AUTO: 101 FL (ref 82–98)
PLATELET # BLD AUTO: 175 THOUSANDS/UL (ref 149–390)
PMV BLD AUTO: 10.7 FL (ref 8.9–12.7)
POTASSIUM SERPL-SCNC: 4.5 MMOL/L (ref 3.5–5.3)
RBC # BLD AUTO: 5.62 MILLION/UL (ref 3.88–5.62)
SODIUM SERPL-SCNC: 137 MMOL/L (ref 135–147)
WBC # BLD AUTO: 16.3 THOUSAND/UL (ref 4.31–10.16)

## 2022-10-22 PROCEDURE — 94760 N-INVAS EAR/PLS OXIMETRY 1: CPT

## 2022-10-22 PROCEDURE — 85027 COMPLETE CBC AUTOMATED: CPT

## 2022-10-22 PROCEDURE — 99233 SBSQ HOSP IP/OBS HIGH 50: CPT

## 2022-10-22 PROCEDURE — 80048 BASIC METABOLIC PNL TOTAL CA: CPT

## 2022-10-22 PROCEDURE — 99232 SBSQ HOSP IP/OBS MODERATE 35: CPT | Performed by: INTERNAL MEDICINE

## 2022-10-22 PROCEDURE — 94640 AIRWAY INHALATION TREATMENT: CPT

## 2022-10-22 RX ADMIN — HEPARIN SODIUM 5000 UNITS: 5000 INJECTION INTRAVENOUS; SUBCUTANEOUS at 21:34

## 2022-10-22 RX ADMIN — GABAPENTIN 300 MG: 300 CAPSULE ORAL at 21:34

## 2022-10-22 RX ADMIN — LEVALBUTEROL HYDROCHLORIDE 1.25 MG: 1.25 SOLUTION, CONCENTRATE RESPIRATORY (INHALATION) at 21:45

## 2022-10-22 RX ADMIN — DOXYCYCLINE 100 MG: 100 CAPSULE ORAL at 21:34

## 2022-10-22 RX ADMIN — ASPIRIN 81 MG 81 MG: 81 TABLET ORAL at 08:14

## 2022-10-22 RX ADMIN — ATORVASTATIN CALCIUM 80 MG: 80 TABLET, FILM COATED ORAL at 17:50

## 2022-10-22 RX ADMIN — LEVALBUTEROL HYDROCHLORIDE 1.25 MG: 1.25 SOLUTION, CONCENTRATE RESPIRATORY (INHALATION) at 07:48

## 2022-10-22 RX ADMIN — FAMOTIDINE 20 MG: 20 TABLET, FILM COATED ORAL at 21:34

## 2022-10-22 RX ADMIN — IPRATROPIUM BROMIDE 0.5 MG: 0.5 SOLUTION RESPIRATORY (INHALATION) at 13:22

## 2022-10-22 RX ADMIN — HEPARIN SODIUM 5000 UNITS: 5000 INJECTION INTRAVENOUS; SUBCUTANEOUS at 05:38

## 2022-10-22 RX ADMIN — IPRATROPIUM BROMIDE 0.5 MG: 0.5 SOLUTION RESPIRATORY (INHALATION) at 07:48

## 2022-10-22 RX ADMIN — FLUTICASONE PROPIONATE 2 SPRAY: 50 SPRAY, METERED NASAL at 08:15

## 2022-10-22 RX ADMIN — POTASSIUM CHLORIDE 20 MEQ: 750 TABLET, EXTENDED RELEASE ORAL at 08:14

## 2022-10-22 RX ADMIN — HEPARIN SODIUM 5000 UNITS: 5000 INJECTION INTRAVENOUS; SUBCUTANEOUS at 14:16

## 2022-10-22 RX ADMIN — BUDESONIDE 0.5 MG: 0.5 INHALANT ORAL at 07:48

## 2022-10-22 RX ADMIN — METOPROLOL SUCCINATE 50 MG: 50 TABLET, EXTENDED RELEASE ORAL at 21:34

## 2022-10-22 RX ADMIN — METOPROLOL SUCCINATE 75 MG: 50 TABLET, EXTENDED RELEASE ORAL at 05:38

## 2022-10-22 RX ADMIN — METHYLPREDNISOLONE SODIUM SUCCINATE 40 MG: 40 INJECTION, POWDER, FOR SOLUTION INTRAMUSCULAR; INTRAVENOUS at 14:16

## 2022-10-22 RX ADMIN — BUDESONIDE 0.5 MG: 0.5 INHALANT ORAL at 21:45

## 2022-10-22 RX ADMIN — LEVALBUTEROL HYDROCHLORIDE 1.25 MG: 1.25 SOLUTION, CONCENTRATE RESPIRATORY (INHALATION) at 13:22

## 2022-10-22 RX ADMIN — METHYLPREDNISOLONE SODIUM SUCCINATE 40 MG: 40 INJECTION, POWDER, FOR SOLUTION INTRAMUSCULAR; INTRAVENOUS at 21:34

## 2022-10-22 RX ADMIN — GABAPENTIN 300 MG: 300 CAPSULE ORAL at 08:14

## 2022-10-22 RX ADMIN — GABAPENTIN 300 MG: 300 CAPSULE ORAL at 17:50

## 2022-10-22 RX ADMIN — METHYLPREDNISOLONE SODIUM SUCCINATE 40 MG: 40 INJECTION, POWDER, FOR SOLUTION INTRAMUSCULAR; INTRAVENOUS at 05:38

## 2022-10-22 RX ADMIN — IPRATROPIUM BROMIDE 0.5 MG: 0.5 SOLUTION RESPIRATORY (INHALATION) at 21:45

## 2022-10-22 RX ADMIN — FUROSEMIDE 40 MG: 40 TABLET ORAL at 08:14

## 2022-10-22 RX ADMIN — B-COMPLEX W/ C & FOLIC ACID TAB 1 TABLET: TAB at 08:14

## 2022-10-22 RX ADMIN — DOXYCYCLINE 100 MG: 100 CAPSULE ORAL at 08:14

## 2022-10-22 NOTE — ASSESSMENT & PLAN NOTE
· Secondary to COPD exacerbation being treated as per above  · Patient normally requires 2 L q h s  and as needed during the day  · Oxygen requirements increased to 4 L via NC  · Continue to titrate down as tolerated

## 2022-10-22 NOTE — PLAN OF CARE
Problem: PAIN - ADULT  Goal: Verbalizes/displays adequate comfort level or baseline comfort level  Description: Interventions:  - Encourage patient to monitor pain and request assistance  - Assess pain using appropriate pain scale  - Administer analgesics based on type and severity of pain and evaluate response  - Implement non-pharmacological measures as appropriate and evaluate response  - Consider cultural and social influences on pain and pain management  - Notify physician/advanced practitioner if interventions unsuccessful or patient reports new pain  Outcome: Progressing     Problem: INFECTION - ADULT  Goal: Absence or prevention of progression during hospitalization  Description: INTERVENTIONS:  - Assess and monitor for signs and symptoms of infection  - Monitor lab/diagnostic results  - Monitor all insertion sites, i e  indwelling lines, tubes, and drains  - Monitor endotracheal if appropriate and nasal secretions for changes in amount and color  - [place] appropriate cooling/warming therapies per order  - Administer medications as ordered  - Instruct and encourage patient and family to use good hand hygiene technique  - Identify and instruct in appropriate isolation precautions for identified infection/condition  Outcome: Progressing  Goal: Absence of fever/infection during neutropenic period  Description: INTERVENTIONS:  - Monitor WBC    Outcome: Progressing     Problem: SAFETY ADULT  Goal: Patient will remain free of falls  Description: INTERVENTIONS:  - Educate patient/family on patient safety including physical limitations  - Instruct patient to call for assistance with activity   - Consult OT/PT to assist with strengthening/mobility   - Keep Call bell within reach  - Keep bed low and locked with side rails adjusted as appropriate  - Keep care items and personal belongings within reach  - Initiate and maintain comfort rounds  - Make Fall Risk Sign visible to staff  - Offer Toileting every 2 Hours, in advance of need  - Initiate/Maintain bedalarm  - Obtain necessary fall risk management equipment: bed alarm  - Apply yellow socks and bracelet for high fall risk patients  - Consider moving patient to room near nurses station  Outcome: Progressing  Goal: Maintain or return to baseline ADL function  Description: INTERVENTIONS:  -  Assess patient's ability to carry out ADLs; assess patient's baseline for ADL function and identify physical deficits which impact ability to perform ADLs (bathing, care of mouth/teeth, toileting, grooming, dressing, etc )  - Assess/evaluate cause of self-care deficits   - Assess range of motion  - Assess patient's mobility; develop plan if impaired  - Assess patient's need for assistive devices and provide as appropriate  - Encourage maximum independence but intervene and supervise when necessary  - Involve family in performance of ADLs  - Assess for home care needs following discharge   - Consider OT consult to assist with ADL evaluation and planning for discharge  - Provide patient education as appropriate  Outcome: Progressing  Goal: Maintains/Returns to pre admission functional level  Description: INTERVENTIONS:  - Perform BMAT or MOVE assessment daily    - Set and communicate daily mobility goal to care team and patient/family/caregiver  - Collaborate with rehabilitation services on mobility goals if consulted  - Perform Range of Motion 3 times a day  - Reposition patient every 2 hours    - Dangle patient 3 times a day  - Stand patient 3 times a day  - Ambulate patient 3 times a day  - Out of bed to chair 3 times a day   - Out of bed for meals 3 times a day  - Out of bed for toileting  - Record patient progress and toleration of activity level   Outcome: Progressing     Problem: DISCHARGE PLANNING  Goal: Discharge to home or other facility with appropriate resources  Description: INTERVENTIONS:  - Identify barriers to discharge w/patient and caregiver  - Arrange for needed discharge resources and transportation as appropriate  - Identify discharge learning needs (meds, wound care, etc )  - Arrange for interpretive services to assist at discharge as needed  - Refer to Case Management Department for coordinating discharge planning if the patient needs post-hospital services based on physician/advanced practitioner order or complex needs related to functional status, cognitive ability, or social support system  Outcome: Progressing     Problem: Knowledge Deficit  Goal: Patient/family/caregiver demonstrates understanding of disease process, treatment plan, medications, and discharge instructions  Description: Complete learning assessment and assess knowledge base    Interventions:  - Provide teaching at level of understanding  - Provide teaching via preferred learning methods  Outcome: Progressing

## 2022-10-22 NOTE — PROGRESS NOTES
[address]  Progress Note - Joe Stalling 1945, 65 y o  male MRN: [id number]  Unit/Bed#: -01 Encounter: [id number]  Primary Care Provider: Isabel Simental DO   Date and time admitted to hospital: 10/19/2022  2:05 PM    * Chronic obstructive pulmonary disease with acute exacerbation ([facility] [address] )  Assessment & Plan  Baseline o2 requirements include 2L NC qhs and intermittently prn throughout the day   · AEB increased O2 requirements due to underlying RSV   · Hold pre-hospital Breo   · Continue Xopenex and Atrovent q 8 hours, Pulmicort 0 5 mg q12 hours   · Continue Solu-Medrol 40 mg IV q 8 hours  Will need slow po taper in the setting of RSV  · Doxycycline day 3/5   · Placed on O2 and respiratory protocol  · Pulmonology consult appreciated   · Initially required 3 L  Now unfortunately up to 4 L    · Goal to titrate O2 down to baseline requirements and plan for DC home on long steroid taper     RSV (respiratory syncytial virus infection)  Assessment & Plan  · Tested positive for RSV 10/20  · Currently on treatment protocol for COPD exacerbation  · Further supportive care  · Per pulmonology, patient will need long steroid taper at discharge  · Please see related assessment/plan under COPD exacerbation    Acute on chronic respiratory failure with hypoxia ([facility] )  Assessment & Plan  · Secondary to COPD exacerbation being treated as per above  · Patient normally requires 2 L q h s  and as needed during the day  · Oxygen requirements increased to 4 L via NC  · Continue to titrate down as tolerated    Obstructive sleep apnea syndrome in adult  Assessment & Plan  · Continue pre-hospital CPAP q h s      Stage 3a chronic kidney disease [facility])  Assessment & Plan  Lab Results   Component Value Date    EGFR 55 10/22/2022    EGFR 55 10/21/2022    EGFR 50 10/20/2022    CREATININE 1 23 10/22/2022    CREATININE 1 23 10/21/2022    CREATININE 1 34 (H) 10/20/2022     · Creatinine appears to be approximately baseline  · Slight improvement with IV diuresis  · Will continue to monitor with repeat labs in a m  Gastroesophageal reflux disease  Assessment & Plan  · Continue pre-hospital Pepcid 20 mg p o  q h s  Chronic diastolic (congestive) heart failure (HCC)  Assessment & Plan  Wt Readings from Last 3 Encounters:   10/19/22 100 kg (221 lb)   10/18/22 100 kg (221 lb)   10/15/22 98 4 kg (217 lb)     · Does not appear to be in exacerbation at this time, although received IV Lasix 40 mg x2 doses for suspected volume overload  · Examines euvolemic  · Home po Lasix 40 mg qd restarted  · Continue pre-hospital Toprol XL 75 mg p o  daily and 50 mg p o  q h s  · Daily weights, I&O's, 2g sodium restricted diet   · Place on no added salt diet        Hypertension  Assessment & Plan  · Continue pre-hospital Toprol 75 mg p o  q a m  and 50 mg p o  q h s  and Lasix 40 mg p o  daily    Hyperlipidemia  Assessment & Plan  · Substitute Lipitor 80 mg p o  daily for pre-hospital Crestor        VTE Pharmacologic Prophylaxis: VTE Score: 10 High Risk (Score >/= 5) - Pharmacological DVT Prophylaxis Ordered: heparin  Sequential Compression Devices Ordered  Patient Centered Rounds: I performed bedside rounds with nursing staff today  Discussions with Specialists or Other Care Team Provider:  Case Management, nursing, pulmonology    Education and Discussions with Family / Patient: Patient declined call to   Time Spent for Care: 30 minutes  More than 50% of total time spent on counseling and coordination of care as described above      Current Length of Stay: 2 day(s)  Current Patient Status: Inpatient   Certification Statement: The patient will continue to require additional inpatient hospital stay due to Oxygen requirements and further treatment for acute on chronic respiratory failure due to RSV  Discharge Plan: Pending clinical course    Code Status: Level 1 - Full Code    Subjective:   Patient seen and examined at the bedside, in no apparent distress  Per nursing, overnight patient coughed and felt a pop in his right lower abdomen  States he feels a dull pain, but has not noticed any bulging or masses  Objective:     Vitals:   Temp (24hrs), Av °F (36 7 °C), Min:97 9 °F (36 6 °C), Max:98 1 °F (36 7 °C)    Temp:  [97 9 °F (36 6 °C)-98 1 °F (36 7 °C)] 97 9 °F (36 6 °C)  HR:  [66-85] 72  Resp:  [18-20] 20  BP: (123-144)/(57-79) 124/70  SpO2:  [87 %-92 %] 88 %  Body mass index is 33 6 kg/m²  Input and Output Summary (last 24 hours): Intake/Output Summary (Last 24 hours) at 10/22/2022 1338  Last data filed at 10/22/2022 0647  Gross per 24 hour   Intake 480 ml   Output 400 ml   Net 80 ml       Physical Exam:   Physical Exam  Vitals and nursing note reviewed  Constitutional:       General: He is not in acute distress  Appearance: He is normal weight  He is not toxic-appearing  Interventions: Nasal cannula in place  HENT:      Head: Normocephalic and atraumatic  Mouth/Throat:      Mouth: Mucous membranes are moist    Eyes:      Conjunctiva/sclera: Conjunctivae normal    Cardiovascular:      Rate and Rhythm: Normal rate and regular rhythm  Pulses: Normal pulses  Heart sounds: Normal heart sounds  Pulmonary:      Effort: Pulmonary effort is normal  No respiratory distress  Breath sounds: Rhonchi present  No wheezing or rales  Abdominal:      General: Abdomen is protuberant  Bowel sounds are normal  There is no distension  Palpations: Abdomen is soft  There is no mass  Tenderness: There is no abdominal tenderness  There is no guarding or rebound  Hernia: No hernia is present  Comments: No evidence of bulging or mass with tensing abdominal muscles   Musculoskeletal:      Cervical back: Neck supple  Right lower leg: No edema  Left lower leg: No edema  Skin:     General: Skin is warm and dry     Neurological:      Mental Status: He is alert and oriented to person, place, and time  Cranial Nerves: No cranial nerve deficit  Sensory: No sensory deficit  Motor: No weakness  Coordination: Coordination normal    Psychiatric:         Mood and Affect: Mood normal          Behavior: Behavior normal          Thought Content: Thought content normal           Additional Data:     Labs:  Results from last 7 days   Lab Units 10/22/22  0542 10/21/22  0502   WBC Thousand/uL 16 30* 14 95*   HEMOGLOBIN g/dL 18 4* 18 2*   HEMATOCRIT % 56 5* 56 8*   PLATELETS Thousands/uL 175 162   NEUTROS PCT %  --  87*   LYMPHS PCT %  --  3*   MONOS PCT %  --  9   EOS PCT %  --  0     Results from last 7 days   Lab Units 10/22/22  0542 10/20/22  0407 10/19/22  1443   SODIUM mmol/L 137   < > 137   POTASSIUM mmol/L 4 5   < > 4 3   CHLORIDE mmol/L 97   < > 98   CO2 mmol/L 33*   < > 30   BUN mg/dL 40*   < > 27*   CREATININE mg/dL 1 23   < > 1 43*   ANION GAP mmol/L 7   < > 9   CALCIUM mg/dL 8 1*   < > 8 5   ALBUMIN g/dL  --   --  4 1   TOTAL BILIRUBIN mg/dL  --   --  0 74   ALK PHOS U/L  --   --  72   ALT U/L  --   --  33   AST U/L  --   --  34   GLUCOSE RANDOM mg/dL 161*   < > 108    < > = values in this interval not displayed  Results from last 7 days   Lab Units 10/15/22  1727   INR  1 02             Results from last 7 days   Lab Units 10/20/22  0407 10/16/22  0532 10/15/22  1727   PROCALCITONIN ng/ml 0 15 0 10 0 11       Lines/Drains:  Invasive Devices  Report    Peripheral Intravenous Line  Duration           Peripheral IV 10/19/22 Distal;Right;Upper;Ventral (anterior) Arm 2 days                      Imaging: No pertinent imaging reviewed      Recent Cultures (last 7 days):         Last 24 Hours Medication List:   Current Facility-Administered Medications   Medication Dose Route Frequency Provider Last Rate   • acetaminophen  650 mg Oral Q6H PRN Irais Andrews PA-C     • al mag oxide-diphenhydramine-lidocaine viscous  10 mL Swish & Swallow Q4H PRN Irais Andrews PA-C • aspirin  81 mg Oral Daily Kaliella CHRISS VazquezC     • atorvastatin  80 mg Oral Daily With Milo Mcgill PA-C     • budesonide  0 5 mg Nebulization Q12H Kristi Murdock MD     • doxycycline hyclate  100 mg Oral Q12H Renella LEONCIO Vazquez-C     • famotidine  20 mg Oral HS Kaliella George PA-C     • fluticasone  2 spray Each Nare Daily Kaliella George PA-C     • furosemide  40 mg Oral Daily Mirian Severs, PA-C     • gabapentin  300 mg Oral TID Kaliella LEONCIO Vazquez-C     • guaiFENesin  200 mg Oral Q4H PRN LEONCIO Abrams-C     • heparin (porcine)  5,000 Units Subcutaneous [facility] LEONCIO Abrams-C     • ipratropium  0 5 mg Nebulization TID Ángela Edmondson DO     • levalbuterol  1 25 mg Nebulization TID Ángela Edmondson DO     • methylPREDNISolone sodium succinate  40 mg Intravenous Q8H LEONCIO Abrams-C     • metoprolol succinate  50 mg Oral HS CHRISS AbramsC     • metoprolol succinate  75 mg Oral Early Morning Kaliella LEONCIO Vazquez-C     • multivitamin stress formula  1 tablet Oral Daily CHRISS AbramsC     • nitroglycerin  0 4 mg Sublingual Q5 Min PRN CHRISS AbramsC     • phenol  1 spray Mouth/Throat Q2H PRN Dianna Lake MD     • potassium chloride  20 mEq Oral Daily Mervat Vazquez PA-C          Today, Patient Was Seen By: Mirian Severs    **Please Note: This note may have been constructed using a voice recognition system  **

## 2022-10-22 NOTE — PROGRESS NOTES
Progress Note - Pulmonary   Mitul Hurd 65 y o  male MRN: [id number]  Unit/Bed#: -01 Encounter: [id number]      Assessment:  1  Acute on chronic hypoxic/hypercapnic respiratory failure  2  Acute RSV bronchitis  3  COPD/emphysema  4  Congestive systolic/diastolic heart failure  5  LAMBERTO    Plan:  · Started to notice improvement compared to yesterday, easier breathing with less wheezing on exam  · Continue Solu-Medrol 40 mg Q 8 until 10/25 and then start tapering  · Doxycycline 100 mg b i d  for COPD exacerbation  · Nebulized only treatment budesonide b i d , Atrovent/Xopenex  · Nocturnal CPAP  · Follow-up with Pulmonary as an outpatient, interested in pulmonary rehab referral however needs insurance approval before    Subjective:   No overnight acute events  Started to feel better today, states that breathing is easier, able to walk to the bathroom with assistance  Still with congestion/wheezing and dyspnea on exertion  Vitals: Blood pressure 131/67, pulse 99, temperature 97 9 °F (36 6 °C), resp  rate 18, height 5' 8" (1 727 m), weight 100 kg (221 lb), SpO2 94 %  , Body mass index is 33 6 kg/m²  Intake/Output Summary (Last 24 hours) at 10/22/2022 1737  Last data filed at 10/22/2022 1322  Gross per 24 hour   Intake 1200 ml   Output 400 ml   Net 800 ml       Physical Exam  Gen: NAD, morbidly obese Body mass index is 33 6 kg/m²  HEENT:supple, no accessory muscle use, no JVD appreciated  Cardiac: RRR, no murmurs appreciated  Lungs: normal respiratory effort, mild/faint end expiratory wheeze bilateral  Abdomen: soft, nontender, normoactive bowel sounds  Extremities: no cyanosis, no clubbing, 1+ pitting peripheral edema  Neuro: AAO X3, no focal motor deficit    Labs: I have personally reviewed pertinent lab results          Kavon Koenig

## 2022-10-22 NOTE — CASE MANAGEMENT
Case Management Assessment & Discharge Planning Note    Patient name Meghan Chu  Location /-01 MRN [id number]  : 1945 Date 10/21/2022       Current Admission Date: 10/19/2022  Current Admission Diagnosis:Chronic obstructive pulmonary disease with acute exacerbation [facility])   Patient Active Problem List    Diagnosis Date Noted   • RSV (respiratory syncytial virus infection) 10/21/2022   • Obstructive sleep apnea syndrome in adult 10/19/2022   • Sensorineural hearing loss, bilateral 10/19/2022   • Acute on chronic respiratory failure with hypoxia ([address] ) 10/15/2022   • Prostate cancer ([facility] [address] ) 2022   • Elevated MCV 2022   • Cubital tunnel syndrome on left 2022   • Carpal tunnel syndrome on left 2022   • Intercostal neuralgia 2022   • Urinary frequency 2022   • Incomplete bladder emptying 2022   • Chronic bilateral low back pain with right-sided sciatica 2022   • Mid back pain 2022   • Thoracic radiculopathy 2022   • Chronic pain syndrome 2022   • Stage 3a chronic kidney disease ([facility] [address] ) 2022   • Hoarseness or changing voice 2022   • Chronic right-sided thoracic back pain 2022   • Primary insomnia 2022   • Right hip pain 2022   • Abnormal nuclear stress test 2021   • Intermittent chest pain 2021   • Costochondral chest pain 01/15/2021   • Chronic obstructive pulmonary disease with acute exacerbation ([address] ) 2021   • Oxygen dependent 2021   • Depression 2021   • S/P carotid endarterectomy 2021   • Stented coronary artery 2021   • Vertigo 2021   • Anisometropia 2021   • Osteoarthritis of spinal facet joint 2021   • Chronic ischemic heart disease 2021   • Chronic diastolic (congestive) heart failure ([address] ) 2021   • Diverticular disease of colon 2021   • Dry eye syndrome 2021   • Gastroesophageal reflux disease 2021   • Hearing loss 01/11/2021   • Elevated PSA 01/11/2021   • Hypoxia 01/11/2021   • Lens replaced by other means 01/11/2021   • Lumbar radiculopathy 01/11/2021   • Multinodular goiter 01/11/2021   • Nuclear senile cataract 01/11/2021   • Obesity 01/11/2021   • Occlusion and stenosis of carotid artery 01/11/2021   • Osteoarthritis of hip 01/11/2021   • Prediabetes 01/11/2021   • Psychosexual dysfunction with inhibited sexual excitement 01/11/2021   • Sleep apnea 01/11/2021   • Solitary pulmonary nodule 01/11/2021   • Thyroid nodule 01/11/2021   • Guyon syndrome, 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   • Hyperlipidemia    • Coronary artery disease involving native coronary artery of native heart without angina pectoris    • Bilateral carotid artery stenosis    • Hypertension       LOS (days): 1  Geometric Mean LOS (GMLOS) (days): 3 90  Days to GMLOS:2 5     OBJECTIVE:  PATIENT READMITTED TO HOSPITAL  Risk of Unplanned Readmission Score: 21 26         Current admission status: Inpatient  Referral Reason: Other (d/c planning)    Preferred Pharmacy:   [address] [place] [address] [address] [place] [address], [address] [place] [address]  DR JONES[address]  [address]   DR Brennen FANG [address]  Phone: [phone] Fax: [phone], [address] [facility]  [address] [facility]  2nd [address] [address]  Phone: [phone] Fax: [phone]    Primary Care Provider: Hilaria Olson DO    Primary Insurance: Dena Sebastian [facility]  Secondary Insurance:     ASSESSMENT:  [address] [address] Representative - Spouse   Primary Phone: [phone] (Mobile)               Advance Directives  Does patient have a [address]?: Yes  Does patient have Advance Directives?: Yes         Readmission Root Cause  30 Day Readmission: Yes  Who directed you to return to the hospital?: Self  Did you understand whom to contact if you had questions or problems?: Yes  Did you get your prescriptions before you left the hospital?: No  Reason[de-identified] Declined service  Were you able to get your prescriptions filled when you left the hospital?: Yes  Did you take your medications as prescribed?: Yes  Were you able to get to your follow-up appointments?: No  Reason[de-identified] Readmitted prior to appointment  During previous admission, was a post-acute recommendation made?: No  Patient was readmitted due to: pt was admitted 10/14/2022-10/15/2022 d/c home   pt returned 10/19/2022 SOB  rsv  Action Plan: outpt follow up , oxygen evaluation  pt states he wears oxygen only at night wiht his cpap  from [place](va)    Patient Information  Admitted from[de-identified] Home  Mental Status: Alert  During Assessment patient was accompanied by: Not accompanied during assessment  Assessment information provided by[de-identified] Patient  Primary Caregiver: Self  Support Systems: Spouse/significant other  [place] of Residence: [facility] [address] do you live in?: [facility] entry access options   Select all that apply : Stairs  Number of steps to enter home : 3  Do the steps have railings?: Yes  Type of Current Residence: [place] (with basement)  In the last 12 months, was there a time when you were not able to pay the mortgage or rent on time?: No  In the last 12 months, how many places have you lived?: 1  In the last 12 months, was there a time when you did not have a steady place to sleep or slept in a shelter (including now)?: No  Homeless/housing insecurity resource given?: N/A  Living Arrangements: Lives w/ Spouse/significant other  Is patient a ?: Yes ([employer]  he receives some benefits)    Activities of Daily Living Prior to Admission  Functional Status: Independent  Completes ADLs independently?: Yes  Ambulates independently?: Yes  Does patient use assisted devices?: No  Does patient currently own DME?: Yes  What DME does the patient currently own?: Straight [employer] Weiss, Walker, Home Oxygen concentrator, CPAP, Bedside Commode, Shower Chair, Nebulizer, Other (Comment) (pulse ox , bp cuff)  Does patient have a history of Outpatient Therapy (PT/OT)?: Yes  Does the patient have a history of Short-Term Rehab?: Yes (he is unsure of the facility)  Does patient have a history of HHC?: No  Does patient currently have [facility]?: No         Patient Information Continued  Income Source: Pension/intermediate  Does patient have prescription coverage?: Yes ([facility]  and [place])  Within the past 12 months, you worried that your food would run out before you got the money to buy more : Never true  Within the past 12 months, the food you bought just didn't last and you didn't have money to get more : Never true  Food insecurity resource given?: N/A  Does patient receive dialysis treatments?: No  Does patient have a history of substance abuse?: No  Does patient have a history of Mental Health Diagnosis?: No         Means of Transportation  Means of Transport to Appts[de-identified] Drives Self  In the past 12 months, has lack of transportation kept you from medical appointments or from getting medications?: No  In the past 12 months, has lack of transportation kept you from meetings, work, or from getting things needed for daily living?: No  Was application for public transport provided?: N/A        DISCHARGE DETAILS:    Discharge planning discussed with[de-identified] patient  Freedom of Choice: Yes  Comments - Freedom of Choice: pt denies any d/c needs  pt states he uses the oxygen only with his cpap                     Requested [address]         Is the patient interested in [facility] at discharge?: No         Other Referral/Resources/Interventions Provided:  Referral Comments: pt denies any d/c needs cm will continue to follow and assess for any addional d/c needs   pt states he only uses 2 liters of oxygen wiht his cpap    Would you like to participate in our [address] service program?  : No - Declined    Treatment Team Recommendation: Home (home with spouse & outpt follow up- family)

## 2022-10-22 NOTE — NURSING NOTE
Pt stated "When I was coughing, I felt a pop in my belly on the right lower side " Hospitalist notified  Pt said that when he coughs, he now gets 7/10 sharp pain in his RLQ of his abd  Pt has call bell and belongings in reach

## 2022-10-22 NOTE — ASSESSMENT & PLAN NOTE
Baseline o2 requirements include 2L NC qhs and intermittently prn throughout the day   · AEB increased O2 requirements due to underlying RSV   · Hold pre-hospital Breo   · Continue Xopenex and Atrovent q 8 hours, Pulmicort 0 5 mg q12 hours   · Continue Solu-Medrol 40 mg IV q 8 hours  Will need slow po taper in the setting of RSV  · Doxycycline day 3/5   · Placed on O2 and respiratory protocol  · Pulmonology consult appreciated   · Initially required 3 L   Now unfortunately up to 4 L    · Goal to titrate O2 down to baseline requirements and plan for DC home on long steroid taper

## 2022-10-22 NOTE — NURSING NOTE
Patient awake and alert, OOB to chair for breakfast  Patient on 4L n/c  SOB on exertion  Expiratory wheezes auscultated  Saturations 86-91%  Patient offers no complaints at this time  RT and PA-C in to see patient

## 2022-10-22 NOTE — ASSESSMENT & PLAN NOTE
Lab Results   Component Value Date    EGFR 55 10/22/2022    EGFR 55 10/21/2022    EGFR 50 10/20/2022    CREATININE 1 23 10/22/2022    CREATININE 1 23 10/21/2022    CREATININE 1 34 (H) 10/20/2022     · Creatinine appears to be approximately baseline  · Slight improvement with IV diuresis  · Will continue to monitor with repeat labs in a m

## 2022-10-22 NOTE — ASSESSMENT & PLAN NOTE
Wt Readings from Last 3 Encounters:   10/19/22 100 kg (221 lb)   10/18/22 100 kg (221 lb)   10/15/22 98 4 kg (217 lb)     · Does not appear to be in exacerbation at this time, although received IV Lasix 40 mg x2 doses for suspected volume overload  · Examines euvolemic  · Home po Lasix 40 mg qd restarted  · Continue pre-hospital Toprol XL 75 mg p o  daily and 50 mg p o  q h s    · Daily weights, I&O's, 2g sodium restricted diet   · Place on no added salt diet

## 2022-10-23 ENCOUNTER — APPOINTMENT (INPATIENT)
Dept: CT IMAGING | Facility: HOSPITAL | Age: 77
DRG: 190 | End: 2022-10-23
Payer: COMMERCIAL

## 2022-10-23 PROBLEM — R10.31 RIGHT LOWER QUADRANT ABDOMINAL PAIN: Status: ACTIVE | Noted: 2022-10-23

## 2022-10-23 LAB
ANION GAP SERPL CALCULATED.3IONS-SCNC: 8 MMOL/L (ref 4–13)
BUN SERPL-MCNC: 39 MG/DL (ref 5–25)
CALCIUM SERPL-MCNC: 8.3 MG/DL (ref 8.4–10.2)
CHLORIDE SERPL-SCNC: 95 MMOL/L (ref 96–108)
CO2 SERPL-SCNC: 35 MMOL/L (ref 21–32)
CREAT SERPL-MCNC: 1.24 MG/DL (ref 0.6–1.3)
ERYTHROCYTE [DISTWIDTH] IN BLOOD BY AUTOMATED COUNT: 14.3 % (ref 11.6–15.1)
GFR SERPL CREATININE-BSD FRML MDRD: 55 ML/MIN/1.73SQ M
GLUCOSE SERPL-MCNC: 162 MG/DL (ref 65–140)
HCT VFR BLD AUTO: 57.5 % (ref 36.5–49.3)
HGB BLD-MCNC: 18.3 G/DL (ref 12–17)
MCH RBC QN AUTO: 31.7 PG (ref 26.8–34.3)
MCHC RBC AUTO-ENTMCNC: 31.8 G/DL (ref 31.4–37.4)
MCV RBC AUTO: 100 FL (ref 82–98)
PLATELET # BLD AUTO: 174 THOUSANDS/UL (ref 149–390)
PMV BLD AUTO: 10.2 FL (ref 8.9–12.7)
POTASSIUM SERPL-SCNC: 4.6 MMOL/L (ref 3.5–5.3)
RBC # BLD AUTO: 5.77 MILLION/UL (ref 3.88–5.62)
SODIUM SERPL-SCNC: 138 MMOL/L (ref 135–147)
WBC # BLD AUTO: 18.15 THOUSAND/UL (ref 4.31–10.16)

## 2022-10-23 PROCEDURE — 94760 N-INVAS EAR/PLS OXIMETRY 1: CPT

## 2022-10-23 PROCEDURE — 99232 SBSQ HOSP IP/OBS MODERATE 35: CPT | Performed by: INTERNAL MEDICINE

## 2022-10-23 PROCEDURE — 74176 CT ABD & PELVIS W/O CONTRAST: CPT

## 2022-10-23 PROCEDURE — 99233 SBSQ HOSP IP/OBS HIGH 50: CPT

## 2022-10-23 PROCEDURE — 80048 BASIC METABOLIC PNL TOTAL CA: CPT

## 2022-10-23 PROCEDURE — G1004 CDSM NDSC: HCPCS

## 2022-10-23 PROCEDURE — 85027 COMPLETE CBC AUTOMATED: CPT

## 2022-10-23 PROCEDURE — 94640 AIRWAY INHALATION TREATMENT: CPT

## 2022-10-23 RX ORDER — MAGNESIUM HYDROXIDE/ALUMINUM HYDROXICE/SIMETHICONE 120; 1200; 1200 MG/30ML; MG/30ML; MG/30ML
30 SUSPENSION ORAL EVERY 4 HOURS PRN
Status: DISCONTINUED | OUTPATIENT
Start: 2022-10-23 | End: 2022-10-25 | Stop reason: HOSPADM

## 2022-10-23 RX ADMIN — METOPROLOL SUCCINATE 75 MG: 50 TABLET, EXTENDED RELEASE ORAL at 05:39

## 2022-10-23 RX ADMIN — DOXYCYCLINE 100 MG: 100 CAPSULE ORAL at 21:59

## 2022-10-23 RX ADMIN — FUROSEMIDE 40 MG: 40 TABLET ORAL at 09:43

## 2022-10-23 RX ADMIN — FLUTICASONE PROPIONATE 2 SPRAY: 50 SPRAY, METERED NASAL at 09:44

## 2022-10-23 RX ADMIN — LEVALBUTEROL HYDROCHLORIDE 1.25 MG: 1.25 SOLUTION, CONCENTRATE RESPIRATORY (INHALATION) at 07:41

## 2022-10-23 RX ADMIN — ALUMINUM HYDROXIDE, MAGNESIUM HYDROXIDE, AND SIMETHICONE 30 ML: 200; 200; 20 SUSPENSION ORAL at 22:29

## 2022-10-23 RX ADMIN — GABAPENTIN 300 MG: 300 CAPSULE ORAL at 18:29

## 2022-10-23 RX ADMIN — POTASSIUM CHLORIDE 20 MEQ: 750 TABLET, EXTENDED RELEASE ORAL at 09:42

## 2022-10-23 RX ADMIN — METHYLPREDNISOLONE SODIUM SUCCINATE 40 MG: 40 INJECTION, POWDER, FOR SOLUTION INTRAMUSCULAR; INTRAVENOUS at 05:40

## 2022-10-23 RX ADMIN — GABAPENTIN 300 MG: 300 CAPSULE ORAL at 09:43

## 2022-10-23 RX ADMIN — BUDESONIDE 0.5 MG: 0.5 INHALANT ORAL at 22:10

## 2022-10-23 RX ADMIN — DOXYCYCLINE 100 MG: 100 CAPSULE ORAL at 09:43

## 2022-10-23 RX ADMIN — IPRATROPIUM BROMIDE 0.5 MG: 0.5 SOLUTION RESPIRATORY (INHALATION) at 13:17

## 2022-10-23 RX ADMIN — METHYLPREDNISOLONE SODIUM SUCCINATE 40 MG: 40 INJECTION, POWDER, FOR SOLUTION INTRAMUSCULAR; INTRAVENOUS at 13:52

## 2022-10-23 RX ADMIN — METOPROLOL SUCCINATE 50 MG: 50 TABLET, EXTENDED RELEASE ORAL at 21:58

## 2022-10-23 RX ADMIN — IPRATROPIUM BROMIDE 0.5 MG: 0.5 SOLUTION RESPIRATORY (INHALATION) at 22:10

## 2022-10-23 RX ADMIN — HEPARIN SODIUM 5000 UNITS: 5000 INJECTION INTRAVENOUS; SUBCUTANEOUS at 09:43

## 2022-10-23 RX ADMIN — FAMOTIDINE 20 MG: 20 TABLET, FILM COATED ORAL at 21:58

## 2022-10-23 RX ADMIN — LEVALBUTEROL HYDROCHLORIDE 1.25 MG: 1.25 SOLUTION, CONCENTRATE RESPIRATORY (INHALATION) at 13:17

## 2022-10-23 RX ADMIN — GABAPENTIN 300 MG: 300 CAPSULE ORAL at 21:58

## 2022-10-23 RX ADMIN — HEPARIN SODIUM 5000 UNITS: 5000 INJECTION INTRAVENOUS; SUBCUTANEOUS at 13:52

## 2022-10-23 RX ADMIN — BUDESONIDE 0.5 MG: 0.5 INHALANT ORAL at 07:41

## 2022-10-23 RX ADMIN — METHYLPREDNISOLONE SODIUM SUCCINATE 40 MG: 40 INJECTION, POWDER, FOR SOLUTION INTRAMUSCULAR; INTRAVENOUS at 21:57

## 2022-10-23 RX ADMIN — ASPIRIN 81 MG 81 MG: 81 TABLET ORAL at 09:43

## 2022-10-23 RX ADMIN — HEPARIN SODIUM 5000 UNITS: 5000 INJECTION INTRAVENOUS; SUBCUTANEOUS at 05:39

## 2022-10-23 RX ADMIN — HEPARIN SODIUM 5000 UNITS: 5000 INJECTION INTRAVENOUS; SUBCUTANEOUS at 21:57

## 2022-10-23 RX ADMIN — IPRATROPIUM BROMIDE 0.5 MG: 0.5 SOLUTION RESPIRATORY (INHALATION) at 07:41

## 2022-10-23 RX ADMIN — LEVALBUTEROL HYDROCHLORIDE 1.25 MG: 1.25 SOLUTION, CONCENTRATE RESPIRATORY (INHALATION) at 22:10

## 2022-10-23 RX ADMIN — ATORVASTATIN CALCIUM 80 MG: 80 TABLET, FILM COATED ORAL at 18:29

## 2022-10-23 NOTE — PROGRESS NOTES
[facility]  Progress Note - Jameson Foote 1945, 65 y o  male MRN: [id number]  Unit/Bed#: -01 Encounter: [id number]  Primary Care Provider: Symone Junior DO   Date and time admitted to hospital: 10/19/2022  2:05 PM    * Chronic obstructive pulmonary disease with acute exacerbation ([facility] )  Assessment & Plan  Baseline o2 requirements include 2L NC qhs and intermittently prn throughout the day   · AEB increased O2 requirements due to underlying RSV   · Hold pre-hospital Breo   · Continue Xopenex and Atrovent q 8 hours, Pulmicort 0 5 mg q12 hours   · Continue Solu-Medrol 40 mg IV q 8 hours  Will need slow po taper in the setting of RSV  · Doxycycline day 4/5   · Placed on O2 and respiratory protocol  · Pulmonology consult appreciated   · Titrated down to 2L  · Goal to titrate O2 down to baseline requirements and plan for DC home on long steroid taper per pulm recommendation     RSV (respiratory syncytial virus infection)  Assessment & Plan  · Tested positive for RSV 10/20  · Currently on treatment protocol for COPD exacerbation  · Further supportive care  · Per pulmonology, patient will need long steroid taper at discharge  · Please see related assessment/plan under COPD exacerbation    Acute on chronic respiratory failure with hypoxia ([place] [address] )  Assessment & Plan  · Secondary to COPD exacerbation being treated as per above  · Patient normally requires 2 L q h s  and as needed during the day  · Oxygen requirements titrated down to 2L  · Continue to titrate down as tolerated    Right lower quadrant abdominal pain  Assessment & Plan  · Patient reports 2 nights ago that he coughed and felt a pop in his right lower abdomen  · Pain has not subsided since, worse with coughing  · No masses or bulges on exam  · Check CT without contrast to rule out hernia    Obstructive sleep apnea syndrome in adult  Assessment & Plan  · Continue pre-hospital CPAP q h s      Stage 3a chronic kidney disease [facility])  Assessment & Plan  Lab Results   Component Value Date    EGFR 55 10/23/2022    EGFR 55 10/22/2022    EGFR 55 10/21/2022    CREATININE 1 24 10/23/2022    CREATININE 1 23 10/22/2022    CREATININE 1 23 10/21/2022     · Creatinine appears to be approximately baseline  · Slight improvement with IV diuresis  · Will continue to monitor with repeat labs in a m  Gastroesophageal reflux disease  Assessment & Plan  · Continue pre-hospital Pepcid 20 mg p o  q h s  Chronic diastolic (congestive) heart failure (HCC)  Assessment & Plan  Wt Readings from Last 3 Encounters:   10/19/22 100 kg (221 lb)   10/18/22 100 kg (221 lb)   10/15/22 98 4 kg (217 lb)     · Does not appear to be in exacerbation at this time, although received IV Lasix 40 mg x2 doses for suspected volume overload  · Examines euvolemic  · Home po Lasix 40 mg qd restarted  · Continue pre-hospital Toprol XL 75 mg p o  daily and 50 mg p o  q h s  · Daily weights, I&O's, 2g sodium restricted diet   · Place on no added salt diet        Hypertension  Assessment & Plan  · Continue pre-hospital Toprol 75 mg p o  q a m  and 50 mg p o  q h s  and Lasix 40 mg p o  daily    Hyperlipidemia  Assessment & Plan  · Substitute Lipitor 80 mg p o  daily for pre-hospital Crestor      VTE Pharmacologic Prophylaxis: VTE Score: 10 High Risk (Score >/= 5) - Pharmacological DVT Prophylaxis Ordered: heparin  Sequential Compression Devices Ordered  Patient Centered Rounds: I performed bedside rounds with nursing staff today  Discussions with Specialists or Other Care Team Provider: case management, nursing, pulmonology    Education and Discussions with Family / Patient: Patient declined call to   Time Spent for Care: 30 minutes  More than 50% of total time spent on counseling and coordination of care as described above      Current Length of Stay: 3 day(s)  Current Patient Status: Inpatient   Certification Statement: The patient will continue to require additional inpatient hospital stay due to O2 requirements for treatment of RSV  Discharge Plan: Anticipate discharge in 24-48 hrs to home  Code Status: Level 1 - Full Code    Subjective:   Patient seen and examined resting comfortably in bedside chair, in no apparent distress  No acute events overnight  Patient states that he is still having right lower quadrant abdominal pain, which is exacerbated by coughing or laughing  Patient states the pain is 5/10 common feels like someone is punching him in the bed  However, patient states that after his abdomen relaxes, the pain subsides  Denies any bulging or masses  Objective:     Vitals:   Temp (24hrs), Av 9 °F (36 1 °C), Min:96 9 °F (36 1 °C), Max:96 9 °F (36 1 °C)    Temp:  [96 9 °F (36 1 °C)] 96 9 °F (36 1 °C)  HR:  [74-99] 74  Resp:  [18-23] 23  BP: (110-143)/(56-79) 110/56  SpO2:  [84 %-94 %] 90 %  Body mass index is 33 6 kg/m²  Input and Output Summary (last 24 hours): Intake/Output Summary (Last 24 hours) at 10/23/2022 1055  Last data filed at 10/22/2022 1322  Gross per 24 hour   Intake 360 ml   Output --   Net 360 ml       Physical Exam:   Physical Exam  Vitals and nursing note reviewed  Constitutional:       General: He is not in acute distress  Appearance: He is normal weight  He is not toxic-appearing  Interventions: Nasal cannula in place  HENT:      Head: Normocephalic and atraumatic  Mouth/Throat:      Mouth: Mucous membranes are moist    Eyes:      Conjunctiva/sclera: Conjunctivae normal    Cardiovascular:      Rate and Rhythm: Normal rate and regular rhythm  Pulses: Normal pulses  Heart sounds: Normal heart sounds  Pulmonary:      Effort: Pulmonary effort is normal  No respiratory distress  Breath sounds: Wheezing and rhonchi present  No rales  Abdominal:      General: Abdomen is protuberant  Bowel sounds are normal  There is no distension  Palpations: Abdomen is soft  Tenderness: There is no abdominal tenderness  There is no guarding or rebound  Hernia: No hernia is present  Musculoskeletal:      Cervical back: Neck supple  Right lower leg: No edema  Left lower leg: No edema  Skin:     General: Skin is warm and dry  Neurological:      Mental Status: He is alert and oriented to person, place, and time  Cranial Nerves: No cranial nerve deficit  Sensory: No sensory deficit  Motor: No weakness  Coordination: Coordination normal    Psychiatric:         Mood and Affect: Mood normal          Behavior: Behavior normal          Thought Content: Thought content normal           Additional Data:     Labs:  Results from last 7 days   Lab Units 10/23/22  0543 10/22/22  0542 10/21/22  0502   WBC Thousand/uL 18 15*   < > 14 95*   HEMOGLOBIN g/dL 18 3*   < > 18 2*   HEMATOCRIT % 57 5*   < > 56 8*   PLATELETS Thousands/uL 174   < > 162   NEUTROS PCT %  --   --  87*   LYMPHS PCT %  --   --  3*   MONOS PCT %  --   --  9   EOS PCT %  --   --  0    < > = values in this interval not displayed  Results from last 7 days   Lab Units 10/23/22  0543 10/20/22  0407 10/19/22  1443   SODIUM mmol/L 138   < > 137   POTASSIUM mmol/L 4 6   < > 4 3   CHLORIDE mmol/L 95*   < > 98   CO2 mmol/L 35*   < > 30   BUN mg/dL 39*   < > 27*   CREATININE mg/dL 1 24   < > 1 43*   ANION GAP mmol/L 8   < > 9   CALCIUM mg/dL 8 3*   < > 8 5   ALBUMIN g/dL  --   --  4 1   TOTAL BILIRUBIN mg/dL  --   --  0 74   ALK PHOS U/L  --   --  72   ALT U/L  --   --  33   AST U/L  --   --  34   GLUCOSE RANDOM mg/dL 162*   < > 108    < > = values in this interval not displayed                   Results from last 7 days   Lab Units 10/20/22  0407   PROCALCITONIN ng/ml 0 15       Lines/Drains:  Invasive Devices  Report    Peripheral Intravenous Line  Duration           Peripheral IV 10/19/22 Distal;Right;Upper;Ventral (anterior) Arm 3 days                      Imaging: No pertinent imaging reviewed  Recent Cultures (last 7 days):         Last 24 Hours Medication List:   Current Facility-Administered Medications   Medication Dose Route Frequency Provider Last Rate   • acetaminophen  650 mg Oral Q6H PRN Watson Phipps PA-C     • al mag oxide-diphenhydramine-lidocaine viscous  10 mL Swish & Swallow Q4H PRN Watson Phipps PA-C     • aspirin  81 mg Oral Daily Watson Phipps PA-C     • atorvastatin  80 mg Oral Daily With Aracelis Pereira PA-C     • budesonide  0 5 mg Nebulization Q12H Jefferson Lane MD     • doxycycline hyclate  100 mg Oral Q12H Watson Phipps PA-C     • famotidine  20 mg Oral HS Watson Phipps PA-C     • fluticasone  2 spray Each Nare Daily Watson Phipps PA-C     • furosemide  40 mg Oral Daily Susie Vences PA-C     • gabapentin  300 mg Oral TID Watson Phipps PA-C     • guaiFENesin  200 mg Oral Q4H PRN Watson Phipps PA-C     • heparin (porcine)  5,000 Units Subcutaneous [facility] Watson Phipps PA-C     • ipratropium  0 5 mg Nebulization TID Yung Advent, DO     • levalbuterol  1 25 mg Nebulization TID Yung Advent, DO     • methylPREDNISolone sodium succinate  40 mg Intravenous Q8H Watson Phipps PA-C     • metoprolol succinate  50 mg Oral HS Watson Phipps PA-C     • metoprolol succinate  75 mg Oral Early Morning Watson Phipps PA-C     • multivitamin stress formula  1 tablet Oral Daily Watson Phipps PA-C     • nitroglycerin  0 4 mg Sublingual Q5 Min PRN Watson Phipps PA-C     • phenol  1 spray Mouth/Throat Q2H PRN Maynor Ledezma MD     • potassium chloride  20 mEq Oral Daily Watson Phipps PA-C          Today, Patient Was Seen By: Susie Vences    **Please Note: This note may have been constructed using a voice recognition system  **

## 2022-10-23 NOTE — PROGRESS NOTES
Progress Note - Pulmonary   Genliyah Lnog 65 y o  male MRN: [id number]  Unit/Bed#: -01 Encounter: [id number]      Assessment:  1  Acute on chronic respiratory failure-hypoxic/hypercapnic  2  Acute RSV bronchitis  3  COPD/emphysema  4  CHF systolic/diastolic  5  LAMBERTO    Plan:  · May switch steroids to q 12 from 10/25 after completing 5 days  · Doxycycline 100 mg b i d  for COPD exacerbation to complete 7 days  · Continue nebulized only treatment budesonide q 12, Atrovent/Xopenex t i d   · Added incentive spirometer, continue to encourage ambulation/OOB  · Nocturnal CPAP  · Follow-up was Pulmonary as an outpatient, interested in pulmonary rehab referral    Subjective:   Slightly better than yesterday, states that dyspnea improved quicker than before after exertion  Otherwise, still with mild intermittent wheezing and minimally productive cough  Vitals: Blood pressure 130/71, pulse 95, temperature (!) 96 9 °F (36 1 °C), resp  rate 18, height 5' 8" (1 727 m), weight 100 kg (221 lb), SpO2 92 %  , Body mass index is 33 6 kg/m²  Intake/Output Summary (Last 24 hours) at 10/23/2022 1529  Last data filed at 10/23/2022 0830  Gross per 24 hour   Intake 240 ml   Output --   Net 240 ml       Physical Exam  Gen: not in acute distress, morbidly obese, Body mass index is 33 6 kg/m²  HEENT:supple, no accessory muscle use, no JVD appreciated  Cardiac: RRR, no murmurs appreciated  Lungs: normal respiratory effort, + faint end expiratory wheeze/bilaterally,  Abdomen: soft, nontender, normoactive bowel sounds  Extremities: no cyanosis, no clubbing, no edema  Neuro: AAO X3, no focal motor deficit    Labs: I have personally reviewed pertinent lab results          Reno Marcial

## 2022-10-23 NOTE — PLAN OF CARE
Problem: PAIN - ADULT  Goal: Verbalizes/displays adequate comfort level or baseline comfort level  Description: Interventions:  - Encourage patient to monitor pain and request assistance  - Assess pain using appropriate pain scale  - Administer analgesics based on type and severity of pain and evaluate response  - Implement non-pharmacological measures as appropriate and evaluate response  - Consider cultural and social influences on pain and pain management  - Notify physician/advanced practitioner if interventions unsuccessful or patient reports new pain  Outcome: Progressing     Problem: INFECTION - ADULT  Goal: Absence or prevention of progression during hospitalization  Description: INTERVENTIONS:  - Assess and monitor for signs and symptoms of infection  - Monitor lab/diagnostic results  - Monitor all insertion sites, i e  indwelling lines, tubes, and drains  - Monitor endotracheal if appropriate and nasal secretions for changes in amount and color  - [place] appropriate cooling/warming therapies per order  - Administer medications as ordered  - Instruct and encourage patient and family to use good hand hygiene technique  - Identify and instruct in appropriate isolation precautions for identified infection/condition  Outcome: Progressing  Goal: Absence of fever/infection during neutropenic period  Description: INTERVENTIONS:  - Monitor WBC    Outcome: Progressing     Problem: SAFETY ADULT  Goal: Patient will remain free of falls  Description: INTERVENTIONS:  - Educate patient/family on patient safety including physical limitations  - Instruct patient to call for assistance with activity   - Consult OT/PT to assist with strengthening/mobility   - Keep Call bell within reach  - Keep bed low and locked with side rails adjusted as appropriate  - Keep care items and personal belongings within reach  - Initiate and maintain comfort rounds  - Make Fall Risk Sign visible to staff  - Offer Toileting every 2 Hours, in advance of need  - Initiate/Maintain bed alarm  - Obtain necessary fall risk management equipment: bed alarm  - Apply yellow socks and bracelet for high fall risk patients  - Consider moving patient to room near nurses station  Outcome: Progressing  Goal: Maintain or return to baseline ADL function  Description: INTERVENTIONS:  -  Assess patient's ability to carry out ADLs; assess patient's baseline for ADL function and identify physical deficits which impact ability to perform ADLs (bathing, care of mouth/teeth, toileting, grooming, dressing, etc )  - Assess/evaluate cause of self-care deficits   - Assess range of motion  - Assess patient's mobility; develop plan if impaired  - Assess patient's need for assistive devices and provide as appropriate  - Encourage maximum independence but intervene and supervise when necessary  - Involve family in performance of ADLs  - Assess for home care needs following discharge   - Consider OT consult to assist with ADL evaluation and planning for discharge  - Provide patient education as appropriate  Outcome: Progressing  Goal: Maintains/Returns to pre admission functional level  Description: INTERVENTIONS:  - Perform BMAT or MOVE assessment daily    - Set and communicate daily mobility goal to care team and patient/family/caregiver  - Collaborate with rehabilitation services on mobility goals if consulted  - Perform Range of Motion 3 times a day  - Reposition patient every 2 hours    - Dangle patient 3 times a day  - Stand patient 3 times a day  - Ambulate patient 3 times a day  - Out of bed to chair 3 times a day   - Out of bed for meals 3 times a day  - Out of bed for toileting  - Record patient progress and toleration of activity level   Outcome: Progressing     Problem: DISCHARGE PLANNING  Goal: Discharge to home or other facility with appropriate resources  Description: INTERVENTIONS:  - Identify barriers to discharge w/patient and caregiver  - Arrange for needed discharge resources and transportation as appropriate  - Identify discharge learning needs (meds, wound care, etc )  - Arrange for interpretive services to assist at discharge as needed  - Refer to Case Management Department for coordinating discharge planning if the patient needs post-hospital services based on physician/advanced practitioner order or complex needs related to functional status, cognitive ability, or social support system  Outcome: Progressing     Problem: Knowledge Deficit  Goal: Patient/family/caregiver demonstrates understanding of disease process, treatment plan, medications, and discharge instructions  Description: Complete learning assessment and assess knowledge base    Interventions:  - Provide teaching at level of understanding  - Provide teaching via preferred learning methods  Outcome: Progressing     Problem: Potential for Falls  Goal: Patient will remain free of falls  Description: INTERVENTIONS:  - Educate patient/family on patient safety including physical limitations  - Instruct patient to call for assistance with activity   - Consult OT/PT to assist with strengthening/mobility   - Keep Call bell within reach  - Keep bed low and locked with side rails adjusted as appropriate  - Keep care items and personal belongings within reach  - Initiate and maintain comfort rounds  - Make Fall Risk Sign visible to staff  - Offer Toileting every 2 Hours, in advance of need  - Initiate/Maintain bed alarm  - Obtain necessary fall risk management equipment: bed alarm  - Apply yellow socks and bracelet for high fall risk patients  - Consider moving patient to room near nurses station  Outcome: Progressing

## 2022-10-23 NOTE — ASSESSMENT & PLAN NOTE
· Secondary to COPD exacerbation being treated as per above  · Patient normally requires 2 L q h s  and as needed during the day  · Oxygen requirements titrated down to 2L  · Continue to titrate down as tolerated

## 2022-10-23 NOTE — ASSESSMENT & PLAN NOTE
· Patient reports 2 nights ago that he coughed and felt a pop in his right lower abdomen  · Pain has not subsided since, worse with coughing  · No masses or bulges on exam  · Check CT without contrast to rule out hernia

## 2022-10-23 NOTE — ASSESSMENT & PLAN NOTE
Lab Results   Component Value Date    EGFR 55 10/23/2022    EGFR 55 10/22/2022    EGFR 55 10/21/2022    CREATININE 1 24 10/23/2022    CREATININE 1 23 10/22/2022    CREATININE 1 23 10/21/2022     · Creatinine appears to be approximately baseline  · Slight improvement with IV diuresis  · Will continue to monitor with repeat labs in a m

## 2022-10-23 NOTE — ASSESSMENT & PLAN NOTE
Baseline o2 requirements include 2L NC qhs and intermittently prn throughout the day   · AEB increased O2 requirements due to underlying RSV   · Hold pre-hospital Breo   · Continue Xopenex and Atrovent q 8 hours, Pulmicort 0 5 mg q12 hours   · Continue Solu-Medrol 40 mg IV q 8 hours  Will need slow po taper in the setting of RSV     · Doxycycline day 4/5   · Placed on O2 and respiratory protocol  · Pulmonology consult appreciated   · Titrated down to 2L  · Goal to titrate O2 down to baseline requirements and plan for DC home on long steroid taper per pulm recommendation

## 2022-10-24 LAB
ANION GAP SERPL CALCULATED.3IONS-SCNC: 7 MMOL/L (ref 4–13)
BUN SERPL-MCNC: 39 MG/DL (ref 5–25)
CALCIUM SERPL-MCNC: 8.4 MG/DL (ref 8.4–10.2)
CHLORIDE SERPL-SCNC: 95 MMOL/L (ref 96–108)
CO2 SERPL-SCNC: 34 MMOL/L (ref 21–32)
CREAT SERPL-MCNC: 1.27 MG/DL (ref 0.6–1.3)
ERYTHROCYTE [DISTWIDTH] IN BLOOD BY AUTOMATED COUNT: 14.1 % (ref 11.6–15.1)
GFR SERPL CREATININE-BSD FRML MDRD: 53 ML/MIN/1.73SQ M
GLUCOSE SERPL-MCNC: 181 MG/DL (ref 65–140)
HCT VFR BLD AUTO: 58.3 % (ref 36.5–49.3)
HGB BLD-MCNC: 18.2 G/DL (ref 12–17)
MCH RBC QN AUTO: 31.1 PG (ref 26.8–34.3)
MCHC RBC AUTO-ENTMCNC: 31.2 G/DL (ref 31.4–37.4)
MCV RBC AUTO: 100 FL (ref 82–98)
PLATELET # BLD AUTO: 185 THOUSANDS/UL (ref 149–390)
PMV BLD AUTO: 10.7 FL (ref 8.9–12.7)
POTASSIUM SERPL-SCNC: 4.5 MMOL/L (ref 3.5–5.3)
RBC # BLD AUTO: 5.86 MILLION/UL (ref 3.88–5.62)
SODIUM SERPL-SCNC: 136 MMOL/L (ref 135–147)
WBC # BLD AUTO: 18.63 THOUSAND/UL (ref 4.31–10.16)

## 2022-10-24 PROCEDURE — 94640 AIRWAY INHALATION TREATMENT: CPT

## 2022-10-24 PROCEDURE — 80048 BASIC METABOLIC PNL TOTAL CA: CPT

## 2022-10-24 PROCEDURE — 94760 N-INVAS EAR/PLS OXIMETRY 1: CPT

## 2022-10-24 PROCEDURE — 85027 COMPLETE CBC AUTOMATED: CPT

## 2022-10-24 PROCEDURE — 99232 SBSQ HOSP IP/OBS MODERATE 35: CPT | Performed by: PHYSICIAN ASSISTANT

## 2022-10-24 PROCEDURE — 99232 SBSQ HOSP IP/OBS MODERATE 35: CPT | Performed by: INTERNAL MEDICINE

## 2022-10-24 RX ADMIN — FLUTICASONE PROPIONATE 2 SPRAY: 50 SPRAY, METERED NASAL at 09:56

## 2022-10-24 RX ADMIN — HEPARIN SODIUM 5000 UNITS: 5000 INJECTION INTRAVENOUS; SUBCUTANEOUS at 05:29

## 2022-10-24 RX ADMIN — LEVALBUTEROL HYDROCHLORIDE 1.25 MG: 1.25 SOLUTION, CONCENTRATE RESPIRATORY (INHALATION) at 07:15

## 2022-10-24 RX ADMIN — BUDESONIDE 0.5 MG: 0.5 INHALANT ORAL at 20:52

## 2022-10-24 RX ADMIN — GABAPENTIN 300 MG: 300 CAPSULE ORAL at 21:53

## 2022-10-24 RX ADMIN — BISMUTH SUBSALICYLATE 524 MG: 525 LIQUID ORAL at 22:21

## 2022-10-24 RX ADMIN — BUDESONIDE 0.5 MG: 0.5 INHALANT ORAL at 07:15

## 2022-10-24 RX ADMIN — DOXYCYCLINE 100 MG: 100 CAPSULE ORAL at 09:54

## 2022-10-24 RX ADMIN — METOPROLOL SUCCINATE 50 MG: 50 TABLET, EXTENDED RELEASE ORAL at 21:53

## 2022-10-24 RX ADMIN — FUROSEMIDE 40 MG: 40 TABLET ORAL at 09:54

## 2022-10-24 RX ADMIN — ASPIRIN 81 MG 81 MG: 81 TABLET ORAL at 09:54

## 2022-10-24 RX ADMIN — METHYLPREDNISOLONE SODIUM SUCCINATE 40 MG: 40 INJECTION, POWDER, FOR SOLUTION INTRAMUSCULAR; INTRAVENOUS at 13:29

## 2022-10-24 RX ADMIN — LEVALBUTEROL HYDROCHLORIDE 1.25 MG: 1.25 SOLUTION, CONCENTRATE RESPIRATORY (INHALATION) at 13:13

## 2022-10-24 RX ADMIN — IPRATROPIUM BROMIDE 0.5 MG: 0.5 SOLUTION RESPIRATORY (INHALATION) at 07:15

## 2022-10-24 RX ADMIN — GABAPENTIN 300 MG: 300 CAPSULE ORAL at 09:53

## 2022-10-24 RX ADMIN — METHYLPREDNISOLONE SODIUM SUCCINATE 40 MG: 40 INJECTION, POWDER, FOR SOLUTION INTRAMUSCULAR; INTRAVENOUS at 21:53

## 2022-10-24 RX ADMIN — HEPARIN SODIUM 5000 UNITS: 5000 INJECTION INTRAVENOUS; SUBCUTANEOUS at 21:53

## 2022-10-24 RX ADMIN — IPRATROPIUM BROMIDE 0.5 MG: 0.5 SOLUTION RESPIRATORY (INHALATION) at 20:52

## 2022-10-24 RX ADMIN — GABAPENTIN 300 MG: 300 CAPSULE ORAL at 16:28

## 2022-10-24 RX ADMIN — POTASSIUM CHLORIDE 20 MEQ: 750 TABLET, EXTENDED RELEASE ORAL at 09:54

## 2022-10-24 RX ADMIN — FAMOTIDINE 20 MG: 20 TABLET, FILM COATED ORAL at 21:53

## 2022-10-24 RX ADMIN — IPRATROPIUM BROMIDE 0.5 MG: 0.5 SOLUTION RESPIRATORY (INHALATION) at 13:13

## 2022-10-24 RX ADMIN — ATORVASTATIN CALCIUM 80 MG: 80 TABLET, FILM COATED ORAL at 16:28

## 2022-10-24 RX ADMIN — METHYLPREDNISOLONE SODIUM SUCCINATE 40 MG: 40 INJECTION, POWDER, FOR SOLUTION INTRAMUSCULAR; INTRAVENOUS at 05:29

## 2022-10-24 RX ADMIN — LEVALBUTEROL HYDROCHLORIDE 1.25 MG: 1.25 SOLUTION, CONCENTRATE RESPIRATORY (INHALATION) at 20:52

## 2022-10-24 RX ADMIN — HEPARIN SODIUM 5000 UNITS: 5000 INJECTION INTRAVENOUS; SUBCUTANEOUS at 13:29

## 2022-10-24 RX ADMIN — BISMUTH SUBSALICYLATE 524 MG: 525 LIQUID ORAL at 15:46

## 2022-10-24 NOTE — PLAN OF CARE
Problem: PAIN - ADULT  Goal: Verbalizes/displays adequate comfort level or baseline comfort level  Description: Interventions:  - Encourage patient to monitor pain and request assistance  - Assess pain using appropriate pain scale  - Administer analgesics based on type and severity of pain and evaluate response  - Implement non-pharmacological measures as appropriate and evaluate response  - Consider cultural and social influences on pain and pain management  - Notify physician/advanced practitioner if interventions unsuccessful or patient reports new pain  Outcome: Progressing     Problem: INFECTION - ADULT  Goal: Absence or prevention of progression during hospitalization  Description: INTERVENTIONS:  - Assess and monitor for signs and symptoms of infection  - Monitor lab/diagnostic results  - Monitor all insertion sites, i e  indwelling lines, tubes, and drains  - Monitor endotracheal if appropriate and nasal secretions for changes in amount and color  - [place] appropriate cooling/warming therapies per order  - Administer medications as ordered  - Instruct and encourage patient and family to use good hand hygiene technique  - Identify and instruct in appropriate isolation precautions for identified infection/condition  Outcome: Progressing  Goal: Absence of fever/infection during neutropenic period  Description: INTERVENTIONS:  - Monitor WBC    Outcome: Progressing     Problem: SAFETY ADULT  Goal: Patient will remain free of falls  Description: INTERVENTIONS:  - Educate patient/family on patient safety including physical limitations  - Instruct patient to call for assistance with activity   - Consult OT/PT to assist with strengthening/mobility   - Keep Call bell within reach  - Keep bed low and locked with side rails adjusted as appropriate  - Keep care items and personal belongings within reach  - Initiate and maintain comfort rounds  - Make Fall Risk Sign visible to staff  - Offer Toileting every 2 Hours, in advance of need  - Initiate/Maintain bed alarm  - Obtain necessary fall risk management equipment: walker   - Apply yellow socks and bracelet for high fall risk patients  - Consider moving patient to room near nurses station  Outcome: Progressing  Goal: Maintain or return to baseline ADL function  Description: INTERVENTIONS:  -  Assess patient's ability to carry out ADLs; assess patient's baseline for ADL function and identify physical deficits which impact ability to perform ADLs (bathing, care of mouth/teeth, toileting, grooming, dressing, etc )  - Assess/evaluate cause of self-care deficits   - Assess range of motion  - Assess patient's mobility; develop plan if impaired  - Assess patient's need for assistive devices and provide as appropriate  - Encourage maximum independence but intervene and supervise when necessary  - Involve family in performance of ADLs  - Assess for home care needs following discharge   - Consider OT consult to assist with ADL evaluation and planning for discharge  - Provide patient education as appropriate  Outcome: Progressing  Goal: Maintains/Returns to pre admission functional level  Description: INTERVENTIONS:  - Perform BMAT or MOVE assessment daily    - Set and communicate daily mobility goal to care team and patient/family/caregiver  - Collaborate with rehabilitation services on mobility goals if consulted  - Perform Range of Motion 3 times a day  - Reposition patient every 2 hours    - Dangle patient 3 times a day  - Stand patient 3 times a day  - Ambulate patient 3 times a day  - Out of bed to chair 3 times a day   - Out of bed for meals 3 times a day  - Out of bed for toileting  - Record patient progress and toleration of activity level   Outcome: Progressing     Problem: DISCHARGE PLANNING  Goal: Discharge to home or other facility with appropriate resources  Description: INTERVENTIONS:  - Identify barriers to discharge w/patient and caregiver  - Arrange for needed discharge resources and transportation as appropriate  - Identify discharge learning needs (meds, wound care, etc )  - Arrange for interpretive services to assist at discharge as needed  - Refer to Case Management Department for coordinating discharge planning if the patient needs post-hospital services based on physician/advanced practitioner order or complex needs related to functional status, cognitive ability, or social support system  Outcome: Progressing     Problem: Knowledge Deficit  Goal: Patient/family/caregiver demonstrates understanding of disease process, treatment plan, medications, and discharge instructions  Description: Complete learning assessment and assess knowledge base    Interventions:  - Provide teaching at level of understanding  - Provide teaching via preferred learning methods  Outcome: Progressing     Problem: Potential for Falls  Goal: Patient will remain free of falls  Description: INTERVENTIONS:  - Educate patient/family on patient safety including physical limitations  - Instruct patient to call for assistance with activity   - Consult OT/PT to assist with strengthening/mobility   - Keep Call bell within reach  - Keep bed low and locked with side rails adjusted as appropriate  - Keep care items and personal belongings within reach  - Initiate and maintain comfort rounds  - Make Fall Risk Sign visible to staff  - Offer Toileting every 2 Hours, in advance of need  - Initiate/Maintain bed alarm  - Obtain necessary fall risk management equipment: walker   - Apply yellow socks and bracelet for high fall risk patients  - Consider moving patient to room near nurses station  Outcome: Progressing

## 2022-10-24 NOTE — CASE MANAGEMENT
Case Management Discharge Planning Note    Patient name Dylan Jewell  Location /-01 MRN [id number]  : 1945 Date 10/24/2022       Current Admission Date: 10/19/2022  Current Admission Diagnosis:Chronic obstructive pulmonary disease with acute exacerbation [facility])   Patient Active Problem List    Diagnosis Date Noted   • Right lower quadrant abdominal pain 10/23/2022   • RSV (respiratory syncytial virus infection) 10/21/2022   • Obstructive sleep apnea syndrome in adult 10/19/2022   • Sensorineural hearing loss, bilateral 10/19/2022   • Acute on chronic respiratory failure with hypoxia ([address] ) 10/15/2022   • Prostate cancer ([facility] ) 2022   • Elevated MCV 2022   • Cubital tunnel syndrome on left 2022   • Carpal tunnel syndrome on left 2022   • Intercostal neuralgia 2022   • Urinary frequency 2022   • Incomplete bladder emptying 2022   • Chronic bilateral low back pain with right-sided sciatica 2022   • Mid back pain 2022   • Thoracic radiculopathy 2022   • Chronic pain syndrome 2022   • Stage 3a chronic kidney disease ([address] ) 2022   • Hoarseness or changing voice 2022   • Chronic right-sided thoracic back pain 2022   • Primary insomnia 2022   • Right hip pain 2022   • Abnormal nuclear stress test 2021   • Intermittent chest pain 2021   • Costochondral chest pain 01/15/2021   • Chronic obstructive pulmonary disease with acute exacerbation ([address] ) 2021   • Oxygen dependent 2021   • Depression 2021   • S/P carotid endarterectomy 2021   • Stented coronary artery 2021   • Vertigo 2021   • Anisometropia 2021   • Osteoarthritis of spinal facet joint 2021   • Chronic ischemic heart disease 2021   • Chronic diastolic (congestive) heart failure ([address] ) 2021   • Diverticular disease of colon 2021   • Dry eye syndrome 2021   • Gastroesophageal reflux disease 01/11/2021   • Hearing loss 01/11/2021   • Elevated PSA 01/11/2021   • Hypoxia 01/11/2021   • Lens replaced by other means 01/11/2021   • Lumbar radiculopathy 01/11/2021   • Multinodular goiter 01/11/2021   • Nuclear senile cataract 01/11/2021   • Obesity 01/11/2021   • Occlusion and stenosis of carotid artery 01/11/2021   • Osteoarthritis of hip 01/11/2021   • Prediabetes 01/11/2021   • Psychosexual dysfunction with inhibited sexual excitement 01/11/2021   • Sleep apnea 01/11/2021   • Solitary pulmonary nodule 01/11/2021   • Thyroid nodule 01/11/2021   • Guyon syndrome, 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   • Hyperlipidemia    • Coronary artery disease involving native coronary artery of native heart without angina pectoris    • Bilateral carotid artery stenosis    • Hypertension       LOS (days): 4  Geometric Mean LOS (GMLOS) (days): 4 20  Days to GMLOS:-0 2     OBJECTIVE:  Risk of Unplanned Readmission Score: 22 1         Current admission status: Inpatient   Preferred Pharmacy:   Arthuro Pippins [address] [facility], [address] [facility] [address]  [address]  [address]   [place] [address]  Phone: [phone] Fax: [phone]    [address] [address] [address] [address]  Phone: [phone] Fax: [phone]    Primary Care Provider: Tonie Agrawal DO    Primary Insurance: [place]  [facility]  Secondary Insurance:     DISCHARGE DETAILS:    Discharge planning discussed with[de-identified] patient and daughter was called at 9:38 am and I left a message , daughter called cm back at 10:42 am  Freedom of Choice: Yes  Comments - Freedom of Choice: pt denies any d/c needs,  cm spoke with his daughter and the pt does have a poratble concentrator- family will bring to the hospital for the transport home  CM contacted family/caregiver?: Yes             Contacts  Patient Contacts: Will Cydney  Relationship to Patient[de-identified] Family (daughter)  Contact Method: Phone  Phone Number: [phone]  Reason/Outcome: Discharge [address]         Is the patient interested in [facility] at discharge?: No    DME Referral Provided  Referral made for DME?: No    Other Referral/Resources/Interventions Provided:  Interventions:  Other (Specify)  Referral Comments: pt denies any d/c needs,  pt may need pulmonary rehab    Would you like to participate in our [address] service program?  : No - Declined    Treatment Team Recommendation: Home (home with family & outpt pulm rehab & outpt follow up- family with his POC)                                   IMM Given (Date):: 10/24/22  IMM Given to[de-identified] Patient  Family notified[de-identified] daughter was called

## 2022-10-24 NOTE — PROGRESS NOTES
Progress Note - Pulmonary   Dylan Jewell 65 y o  male MRN: [id number]  Unit/Bed#: -01 Encounter: [id number]      Assessment:  1  Acute on chronic respiratory failure  2  Acute RSV bronchitis  3  COPD W/exacerbation  4  Chronic systolic/diastolic heart failure  5  LAMBERTO    Plan:  · May switch to oral steroids from tomorrow prednisone 40 mg, consider slow taper after the on discharge, down by 10 mg every 5 days to stop  · Continue nebulized only treatment budesonide q 12, Atrovent/Xopenex t i d   · On discharge, switched to nebulized only treatment budesonide q 12, Brovana/or Perforomist q 12 instead of the home inhalers  · Continue nocturnal BiPAP  · Follow-up as an outpatient with pulmonary    Subjective:   No overnight acute events  Slightly better from yesterday  Able to ambulate in the home but slowly    Vitals: Blood pressure 136/69, pulse 98, temperature 97 7 °F (36 5 °C), resp  rate 22, height 5' 8" (1 727 m), weight 100 kg (221 lb), SpO2 91 % , Body mass index is 33 6 kg/m²  Intake/Output Summary (Last 24 hours) at 10/24/2022 1645  Last data filed at 10/23/2022 2207  Gross per 24 hour   Intake 480 ml   Output --   Net 480 ml       Physical Exam  Gen: not in acute distress, morbidly obese, Body mass index is 33 6 kg/m²  HEENT:supple, no accessory muscle use, no JVD appreciated  Cardiac: RRR, no murmurs appreciated  Lungs: normal respiratory effort, mild/faint end expiratory wheeze  Abdomen: soft, nontender  Extremities: no cyanosis, no clubbing, no edema  Neuro: AAO X3, no focal motor deficit    Labs: I have personally reviewed pertinent lab results          Daisy Celestin

## 2022-10-24 NOTE — ASSESSMENT & PLAN NOTE
Baseline o2 requirements include 2L NC qhs and intermittently prn throughout the day   · AEB increased O2 requirements due to underlying RSV   · Hold pre-hospital Breo   · Continue Xopenex and Atrovent q 8 hours, Pulmicort 0 5 mg q12 hours   · Continue Solu-Medrol 40 mg IV q 8 hours till 10/25/22 per pulmonology recommendations  Will need slow po taper in the setting of RSV     · Doxycycline day 5/5   · Placed on O2 and respiratory protocol  · Pulmonology consult appreciated   · Titrated down to 2L- now at 3 L today  · Goal to titrate O2 down to baseline requirements and plan for DC home on long steroid taper per pulm recommendation

## 2022-10-24 NOTE — PLAN OF CARE
Problem: PAIN - ADULT  Goal: Verbalizes/displays adequate comfort level or baseline comfort level  Description: Interventions:  - Encourage patient to monitor pain and request assistance  - Assess pain using appropriate pain scale  - Administer analgesics based on type and severity of pain and evaluate response  - Implement non-pharmacological measures as appropriate and evaluate response  - Consider cultural and social influences on pain and pain management  - Notify physician/advanced practitioner if interventions unsuccessful or patient reports new pain  Outcome: Progressing     Problem: INFECTION - ADULT  Goal: Absence or prevention of progression during hospitalization  Description: INTERVENTIONS:  - Assess and monitor for signs and symptoms of infection  - Monitor lab/diagnostic results  - Monitor all insertion sites, i e  indwelling lines, tubes, and drains  - Monitor endotracheal if appropriate and nasal secretions for changes in amount and color  - [place] appropriate cooling/warming therapies per order  - Administer medications as ordered  - Instruct and encourage patient and family to use good hand hygiene technique  - Identify and instruct in appropriate isolation precautions for identified infection/condition  Outcome: Progressing  Goal: Absence of fever/infection during neutropenic period  Description: INTERVENTIONS:  - Monitor WBC    Outcome: Progressing     Problem: SAFETY ADULT  Goal: Patient will remain free of falls  Description: INTERVENTIONS:  - Educate patient/family on patient safety including physical limitations  - Instruct patient to call for assistance with activity   - Consult OT/PT to assist with strengthening/mobility   - Keep Call bell within reach  - Keep bed low and locked with side rails adjusted as appropriate  - Keep care items and personal belongings within reach  - Initiate and maintain comfort rounds  - Make Fall Risk Sign visible to staff  - Offer Toileting every 2 Hours, in advance of need  - Initiate/Maintain bed alarm  - Obtain necessary fall risk management equipment: bed alarm  - Apply yellow socks and bracelet for high fall risk patients  - Consider moving patient to room near nurses station  Outcome: Progressing  Goal: Maintain or return to baseline ADL function  Description: INTERVENTIONS:  -  Assess patient's ability to carry out ADLs; assess patient's baseline for ADL function and identify physical deficits which impact ability to perform ADLs (bathing, care of mouth/teeth, toileting, grooming, dressing, etc )  - Assess/evaluate cause of self-care deficits   - Assess range of motion  - Assess patient's mobility; develop plan if impaired  - Assess patient's need for assistive devices and provide as appropriate  - Encourage maximum independence but intervene and supervise when necessary  - Involve family in performance of ADLs  - Assess for home care needs following discharge   - Consider OT consult to assist with ADL evaluation and planning for discharge  - Provide patient education as appropriate  Outcome: Progressing  Goal: Maintains/Returns to pre admission functional level  Description: INTERVENTIONS:  - Perform BMAT or MOVE assessment daily    - Set and communicate daily mobility goal to care team and patient/family/caregiver  - Collaborate with rehabilitation services on mobility goals if consulted  - Perform Range of Motion 3 times a day  - Reposition patient every 2 hours    - Dangle patient 3 times a day  - Stand patient 3 times a day  - Ambulate patient 3 times a day  - Out of bed to chair 3 times a day   - Out of bed for meals 3 times a day  - Out of bed for toileting  - Record patient progress and toleration of activity level   Outcome: Progressing     Problem: DISCHARGE PLANNING  Goal: Discharge to home or other facility with appropriate resources  Description: INTERVENTIONS:  - Identify barriers to discharge w/patient and caregiver  - Arrange for needed discharge resources and transportation as appropriate  - Identify discharge learning needs (meds, wound care, etc )  - Arrange for interpretive services to assist at discharge as needed  - Refer to Case Management Department for coordinating discharge planning if the patient needs post-hospital services based on physician/advanced practitioner order or complex needs related to functional status, cognitive ability, or social support system  Outcome: Progressing     Problem: Knowledge Deficit  Goal: Patient/family/caregiver demonstrates understanding of disease process, treatment plan, medications, and discharge instructions  Description: Complete learning assessment and assess knowledge base    Interventions:  - Provide teaching at level of understanding  - Provide teaching via preferred learning methods  Outcome: Progressing     Problem: Potential for Falls  Goal: Patient will remain free of falls  Description: INTERVENTIONS:  - Educate patient/family on patient safety including physical limitations  - Instruct patient to call for assistance with activity   - Consult OT/PT to assist with strengthening/mobility   - Keep Call bell within reach  - Keep bed low and locked with side rails adjusted as appropriate  - Keep care items and personal belongings within reach  - Initiate and maintain comfort rounds  - Make Fall Risk Sign visible to staff  - Offer Toileting every 2 Hours, in advance of need  - Initiate/Maintain bed alarm  - Obtain necessary fall risk management equipment: bed alarm  - Apply yellow socks and bracelet for high fall risk patients  - Consider moving patient to room near nurses station  Outcome: Progressing

## 2022-10-24 NOTE — ASSESSMENT & PLAN NOTE
· Patient reports 2 nights ago that he coughed and felt a pop in his right lower abdomen  · Pain has not subsided since, worse with coughing  · No masses or bulges on exam  · CT A/P negative for acute findings

## 2022-10-24 NOTE — ASSESSMENT & PLAN NOTE
Lab Results   Component Value Date    EGFR 53 10/24/2022    EGFR 55 10/23/2022    EGFR 55 10/22/2022    CREATININE 1 27 10/24/2022    CREATININE 1 24 10/23/2022    CREATININE 1 23 10/22/2022     · Creatinine appears to be approximately baseline  · Slight improvement with IV diuresis  · Will continue to monitor with repeat labs in a m

## 2022-10-24 NOTE — PROGRESS NOTES
Martina U  65   Progress Note - Krystal Thompson 1945, 65 y o  male MRN: [id number]  Unit/Bed#: -01 Encounter: [id number]  Primary Care Provider: Leora Guthrie DO   Date and time admitted to hospital: 10/19/2022  2:05 PM    * Chronic obstructive pulmonary disease with acute exacerbation ([address] )  Assessment & Plan  Baseline o2 requirements include 2L NC qhs and intermittently prn throughout the day   · AEB increased O2 requirements due to underlying RSV   · Hold pre-hospital Breo   · Continue Xopenex and Atrovent q 8 hours, Pulmicort 0 5 mg q12 hours   · Continue Solu-Medrol 40 mg IV q 8 hours till 10/25/22 per pulmonology recommendations  Will need slow po taper in the setting of RSV     · Doxycycline day 5/5   · Placed on O2 and respiratory protocol  · Pulmonology consult appreciated   · Titrated down to 2L- now at 3 L today  · Goal to titrate O2 down to baseline requirements and plan for DC home on long steroid taper per pulm recommendation     RSV (respiratory syncytial virus infection)  Assessment & Plan  · Tested positive for RSV 10/20  · Currently on treatment protocol for COPD exacerbation  · Further supportive care  · Per pulmonology, patient will need long steroid taper at discharge  · Please see related assessment/plan under COPD exacerbation    Acute on chronic respiratory failure with hypoxia ([place] [id number] )  Assessment & Plan  · Secondary to COPD exacerbation being treated as per above  · Patient normally requires 2 L q h s  and as needed during the day  · Oxygen requirements titrated down to 2L- now increased to 3L today  · Continue to titrate down as tolerated    Right lower quadrant abdominal pain  Assessment & Plan  · Patient reports 2 nights ago that he coughed and felt a pop in his right lower abdomen  · Pain has not subsided since, worse with coughing  · No masses or bulges on exam  · CT A/P negative for acute findings    Obstructive sleep apnea syndrome in adult  Assessment & Plan  · Continue pre-hospital CPAP q h s  Stage 3a chronic kidney disease [facility])  Assessment & Plan  Lab Results   Component Value Date    EGFR 53 10/24/2022    EGFR 55 10/23/2022    EGFR 55 10/22/2022    CREATININE 1 27 10/24/2022    CREATININE 1 24 10/23/2022    CREATININE 1 23 10/22/2022     · Creatinine appears to be approximately baseline  · Slight improvement with IV diuresis  · Will continue to monitor with repeat labs in a m  Gastroesophageal reflux disease  Assessment & Plan  · Continue pre-hospital Pepcid 20 mg p o  q h s  Chronic diastolic (congestive) heart failure (HCC)  Assessment & Plan  Wt Readings from Last 3 Encounters:   10/19/22 100 kg (221 lb)   10/18/22 100 kg (221 lb)   10/15/22 98 4 kg (217 lb)     · Does not appear to be in exacerbation at this time, although received IV Lasix 40 mg x2 doses for suspected volume overload  · Examines euvolemic  · Home po Lasix 40 mg qd restarted  · Continue pre-hospital Toprol XL 75 mg p o  daily and 50 mg p o  q h s  · Daily weights, I&O's, 2g sodium restricted diet   · Place on no added salt diet        Hypertension  Assessment & Plan  · Continue pre-hospital Toprol 75 mg p o  q a m  and 50 mg p o  q h s  and Lasix 40 mg p o  daily    Hyperlipidemia  Assessment & Plan  · Substitute Lipitor 80 mg p o  daily for pre-hospital Crestor        VTE Pharmacologic Prophylaxis: VTE Score: 10 High Risk (Score >/= 5) - Pharmacological DVT Prophylaxis Ordered: heparin  Sequential Compression Devices Ordered  Patient Centered Rounds: I performed bedside rounds with nursing staff today  Discussions with Specialists or Other Care Team Provider: nursing    Education and Discussions with Family / Patient: Patient declined call to   Time Spent for Care: 20 minutes  More than 50% of total time spent on counseling and coordination of care as described above      Current Length of Stay: 4 day(s)  Current Patient Status: Inpatient Certification Statement: The patient will continue to require additional inpatient hospital stay due to continued need for IV steroids  Discharge Plan: Anticipate discharge in 24-48 hrs to home  Code Status: Level 1 - Full Code    Subjective: The patient was seen and examined  The patient states he's feeling depressed about being here in the hospital  He states his breathing is improving  Objective:     Vitals:   Temp (24hrs), Av 8 °F (36 °C), Min:96 7 °F (35 9 °C), Max:96 8 °F (36 °C)    Temp:  [96 7 °F (35 9 °C)-96 8 °F (36 °C)] 96 8 °F (36 °C)  HR:  [73-95] 73  Resp:  [18] 18  BP: ()/(66-72) 110/66  SpO2:  [88 %-95 %] 95 %  Body mass index is 33 6 kg/m²  Input and Output Summary (last 24 hours): Intake/Output Summary (Last 24 hours) at 10/24/2022 1314  Last data filed at 10/23/2022 2207  Gross per 24 hour   Intake 480 ml   Output --   Net 480 ml       Physical Exam:   Physical Exam  Vitals and nursing note reviewed  Constitutional:       General: He is awake  Appearance: He is obese  Interventions: Nasal cannula in place  Cardiovascular:      Rate and Rhythm: Normal rate and regular rhythm  Pulmonary:      Effort: Pulmonary effort is normal       Breath sounds: Normal breath sounds  Decreased air movement present  Abdominal:      Palpations: Abdomen is soft  Tenderness: There is no abdominal tenderness  Skin:     General: Skin is warm and dry  Neurological:      General: No focal deficit present  Mental Status: He is alert and oriented to person, place, and time  Psychiatric:         Attention and Perception: Attention normal          Mood and Affect: Mood normal          Speech: Speech normal          Behavior: Behavior is cooperative            Additional Data:     Labs:  Results from last 7 days   Lab Units 10/24/22  0434 10/22/22  0542 10/21/22  0502   WBC Thousand/uL 18 63*   < > 14 95*   HEMOGLOBIN g/dL 18 2*   < > 18 2*   HEMATOCRIT % 58 3*   < > 56 8*   PLATELETS Thousands/uL 185   < > 162   NEUTROS PCT %  --   --  87*   LYMPHS PCT %  --   --  3*   MONOS PCT %  --   --  9   EOS PCT %  --   --  0    < > = values in this interval not displayed  Results from last 7 days   Lab Units 10/24/22  0434 10/20/22  0407 10/19/22  1443   SODIUM mmol/L 136   < > 137   POTASSIUM mmol/L 4 5   < > 4 3   CHLORIDE mmol/L 95*   < > 98   CO2 mmol/L 34*   < > 30   BUN mg/dL 39*   < > 27*   CREATININE mg/dL 1 27   < > 1 43*   ANION GAP mmol/L 7   < > 9   CALCIUM mg/dL 8 4   < > 8 5   ALBUMIN g/dL  --   --  4 1   TOTAL BILIRUBIN mg/dL  --   --  0 74   ALK PHOS U/L  --   --  72   ALT U/L  --   --  33   AST U/L  --   --  34   GLUCOSE RANDOM mg/dL 181*   < > 108    < > = values in this interval not displayed  Results from last 7 days   Lab Units 10/20/22  0407   PROCALCITONIN ng/ml 0 15       Lines/Drains:  Invasive Devices  Report    Peripheral Intravenous Line  Duration           Peripheral IV 10/19/22 Distal;Right;Upper;Ventral (anterior) Arm 4 days                      Imaging: No pertinent imaging reviewed      Recent Cultures (last 7 days):         Last 24 Hours Medication List:   Current Facility-Administered Medications   Medication Dose Route Frequency Provider Last Rate   • acetaminophen  650 mg Oral Q6H PRN Ventura Arizmendi PA-C     • al mag oxide-diphenhydramine-lidocaine viscous  10 mL Swish & Swallow Q4H PRN Ventura Arizmendi PA-C     • aluminum-magnesium hydroxide-simethicone  30 mL Oral Q4H PRN Ventura Arizmendi PA-C     • aspirin  81 mg Oral Daily Ventura Arizmendi PA-C     • atorvastatin  80 mg Oral Daily With Helena Guevara PA-C     • budesonide  0 5 mg Nebulization Q12H Chel Corona MD     • famotidine  20 mg Oral HS Ventura Arizmendi PA-C     • fluticasone  2 spray Each Nare Daily Ventura Arizmendi PA-C     • furosemide  40 mg Oral Daily Abdifatah Bradley PA-C     • gabapentin  300 mg Oral TID Ventura Arizmendi PA-C     • guaiFENesin  200 mg Oral Q4H PRN Suzie Laureano PA-C     • heparin (porcine)  5,000 Units Subcutaneous [facility] Suzie Laureano PA-C     • ipratropium  0 5 mg Nebulization TID Sahra Ruiz DO     • levalbuterol  1 25 mg Nebulization TID Sahra Ruiz DO     • methylPREDNISolone sodium succinate  40 mg Intravenous Q8H Suzie Laureano PA-C     • metoprolol succinate  50 mg Oral HS Suzie Laureano PA-C     • metoprolol succinate  75 mg Oral Early Morning Suzie Laureano PA-C     • multivitamin stress formula  1 tablet Oral Daily Suzie Laureano PA-C     • nitroglycerin  0 4 mg Sublingual Q5 Min PRN Suzie Laureano PA-C     • phenol  1 spray Mouth/Throat Q2H PRN Luis Fernando Ni MD     • potassium chloride  20 mEq Oral Daily Suzie Laureano PA-C          Today, Patient Was Seen By: Rod Heck    **Please Note: This note may have been constructed using a voice recognition system  **

## 2022-10-24 NOTE — ASSESSMENT & PLAN NOTE
· Secondary to COPD exacerbation being treated as per above  · Patient normally requires 2 L q h s  and as needed during the day  · Oxygen requirements titrated down to 2L- now increased to 3L today  · Continue to titrate down as tolerated

## 2022-10-25 ENCOUNTER — TRANSITIONAL CARE MANAGEMENT (OUTPATIENT)
Dept: FAMILY MEDICINE CLINIC | Facility: CLINIC | Age: 77
End: 2022-10-25

## 2022-10-25 VITALS
DIASTOLIC BLOOD PRESSURE: 65 MMHG | BODY MASS INDEX: 33.49 KG/M2 | OXYGEN SATURATION: 88 % | SYSTOLIC BLOOD PRESSURE: 122 MMHG | TEMPERATURE: 97.5 F | HEIGHT: 68 IN | HEART RATE: 78 BPM | WEIGHT: 221 LBS | RESPIRATION RATE: 22 BRPM

## 2022-10-25 LAB
ANION GAP SERPL CALCULATED.3IONS-SCNC: 7 MMOL/L (ref 4–13)
BUN SERPL-MCNC: 38 MG/DL (ref 5–25)
CALCIUM SERPL-MCNC: 8.2 MG/DL (ref 8.4–10.2)
CHLORIDE SERPL-SCNC: 96 MMOL/L (ref 96–108)
CO2 SERPL-SCNC: 33 MMOL/L (ref 21–32)
CREAT SERPL-MCNC: 1.22 MG/DL (ref 0.6–1.3)
ERYTHROCYTE [DISTWIDTH] IN BLOOD BY AUTOMATED COUNT: 14.1 % (ref 11.6–15.1)
GFR SERPL CREATININE-BSD FRML MDRD: 56 ML/MIN/1.73SQ M
GLUCOSE SERPL-MCNC: 176 MG/DL (ref 65–140)
HCT VFR BLD AUTO: 57 % (ref 36.5–49.3)
HGB BLD-MCNC: 17.7 G/DL (ref 12–17)
MCH RBC QN AUTO: 30.9 PG (ref 26.8–34.3)
MCHC RBC AUTO-ENTMCNC: 31.1 G/DL (ref 31.4–37.4)
MCV RBC AUTO: 100 FL (ref 82–98)
PLATELET # BLD AUTO: 190 THOUSANDS/UL (ref 149–390)
PMV BLD AUTO: 10.8 FL (ref 8.9–12.7)
POTASSIUM SERPL-SCNC: 4.8 MMOL/L (ref 3.5–5.3)
RBC # BLD AUTO: 5.72 MILLION/UL (ref 3.88–5.62)
SODIUM SERPL-SCNC: 136 MMOL/L (ref 135–147)
WBC # BLD AUTO: 19.22 THOUSAND/UL (ref 4.31–10.16)

## 2022-10-25 PROCEDURE — 85027 COMPLETE CBC AUTOMATED: CPT | Performed by: PHYSICIAN ASSISTANT

## 2022-10-25 PROCEDURE — 94640 AIRWAY INHALATION TREATMENT: CPT

## 2022-10-25 PROCEDURE — 99238 HOSP IP/OBS DSCHRG MGMT 30/<: CPT | Performed by: PHYSICIAN ASSISTANT

## 2022-10-25 PROCEDURE — 80048 BASIC METABOLIC PNL TOTAL CA: CPT | Performed by: PHYSICIAN ASSISTANT

## 2022-10-25 PROCEDURE — 94760 N-INVAS EAR/PLS OXIMETRY 1: CPT

## 2022-10-25 RX ORDER — PREDNISONE 10 MG/1
TABLET ORAL
Qty: 50 TABLET | Refills: 0 | Status: SHIPPED | OUTPATIENT
Start: 2022-10-26 | End: 2022-11-15

## 2022-10-25 RX ORDER — ARFORMOTEROL TARTRATE 15 UG/2ML
15 SOLUTION RESPIRATORY (INHALATION) 2 TIMES DAILY
Qty: 120 ML | Refills: 0 | Status: SHIPPED | OUTPATIENT
Start: 2022-10-25

## 2022-10-25 RX ORDER — BUDESONIDE 0.5 MG/2ML
0.5 INHALANT ORAL
Qty: 120 ML | Refills: 0 | Status: SHIPPED | OUTPATIENT
Start: 2022-10-25

## 2022-10-25 RX ADMIN — ASPIRIN 81 MG 81 MG: 81 TABLET ORAL at 09:23

## 2022-10-25 RX ADMIN — GABAPENTIN 300 MG: 300 CAPSULE ORAL at 09:23

## 2022-10-25 RX ADMIN — METOPROLOL SUCCINATE 75 MG: 50 TABLET, EXTENDED RELEASE ORAL at 05:15

## 2022-10-25 RX ADMIN — BUDESONIDE 0.5 MG: 0.5 INHALANT ORAL at 07:04

## 2022-10-25 RX ADMIN — BISMUTH SUBSALICYLATE 524 MG: 525 LIQUID ORAL at 09:28

## 2022-10-25 RX ADMIN — FUROSEMIDE 40 MG: 40 TABLET ORAL at 09:24

## 2022-10-25 RX ADMIN — POTASSIUM CHLORIDE 20 MEQ: 750 TABLET, EXTENDED RELEASE ORAL at 09:24

## 2022-10-25 RX ADMIN — FLUTICASONE PROPIONATE 2 SPRAY: 50 SPRAY, METERED NASAL at 09:24

## 2022-10-25 RX ADMIN — LEVALBUTEROL HYDROCHLORIDE 1.25 MG: 1.25 SOLUTION, CONCENTRATE RESPIRATORY (INHALATION) at 07:02

## 2022-10-25 RX ADMIN — METHYLPREDNISOLONE SODIUM SUCCINATE 40 MG: 40 INJECTION, POWDER, FOR SOLUTION INTRAMUSCULAR; INTRAVENOUS at 05:15

## 2022-10-25 RX ADMIN — IPRATROPIUM BROMIDE 0.5 MG: 0.5 SOLUTION RESPIRATORY (INHALATION) at 07:04

## 2022-10-25 RX ADMIN — HEPARIN SODIUM 5000 UNITS: 5000 INJECTION INTRAVENOUS; SUBCUTANEOUS at 05:15

## 2022-10-25 NOTE — ASSESSMENT & PLAN NOTE
Wt Readings from Last 3 Encounters:   10/19/22 100 kg (221 lb)   10/18/22 100 kg (221 lb)   10/15/22 98 4 kg (217 lb)     · Does not appear to be in exacerbation at this time, although received IV Lasix 40 mg x2 doses for suspected volume overload  · Examines euvolemic  · Home po Lasix 40 mg qd restarted  · Continue pre-hospital Toprol XL 75 mg p o  daily and 50 mg p o  q h s

## 2022-10-25 NOTE — ASSESSMENT & PLAN NOTE
Baseline o2 requirements include 2L NC qhs and intermittently prn throughout the day   · AEB increased O2 requirements due to underlying RSV   · Hold pre-hospital Breo   · Patient recieved Xopenex and Atrovent q 8 hours, Pulmicort 0 5 mg q12 hours during hospitalization  · Patient recieved Solu-Medrol 40 mg IV q 8 hours till 10/25/22 per pulmonology recommendations during hospitalization   · Patient completed a 5 day course of Doxycycline on 12/24/22  · Pulmonology consult appreciated   · Patient discharged on prednisone 40 mg daily which will be decreased by 10 mg every 5 days  He was also switched to nebulized treatment with budesnide q 12 hours and brovana q 12 hours instead of home inhalers per pulmonology recommendations     · Outpatient follow-up with Pulmonology and PCP

## 2022-10-25 NOTE — ASSESSMENT & PLAN NOTE
Lab Results   Component Value Date    EGFR 56 10/25/2022    EGFR 53 10/24/2022    EGFR 55 10/23/2022    CREATININE 1 22 10/25/2022    CREATININE 1 27 10/24/2022    CREATININE 1 24 10/23/2022     · Creatinine appears to be approximately baseline  · Slight improvement with IV diuresis

## 2022-10-25 NOTE — PLAN OF CARE
Problem: PAIN - ADULT  Goal: Verbalizes/displays adequate comfort level or baseline comfort level  Description: Interventions:  - Encourage patient to monitor pain and request assistance  - Assess pain using appropriate pain scale  - Administer analgesics based on type and severity of pain and evaluate response  - Implement non-pharmacological measures as appropriate and evaluate response  - Consider cultural and social influences on pain and pain management  - Notify physician/advanced practitioner if interventions unsuccessful or patient reports new pain  Outcome: Progressing     Problem: INFECTION - ADULT  Goal: Absence or prevention of progression during hospitalization  Description: INTERVENTIONS:  - Assess and monitor for signs and symptoms of infection  - Monitor lab/diagnostic results  - Monitor all insertion sites, i e  indwelling lines, tubes, and drains  - Monitor endotracheal if appropriate and nasal secretions for changes in amount and color  - Lequire appropriate cooling/warming therapies per order  - Administer medications as ordered  - Instruct and encourage patient and family to use good hand hygiene technique  - Identify and instruct in appropriate isolation precautions for identified infection/condition  Outcome: Progressing     Problem: DISCHARGE PLANNING  Goal: Discharge to home or other facility with appropriate resources  Description: INTERVENTIONS:  - Identify barriers to discharge w/patient and caregiver  - Arrange for needed discharge resources and transportation as appropriate  - Identify discharge learning needs (meds, wound care, etc )  - Refer to Case Management Department for coordinating discharge planning if the patient needs post-hospital services based on physician/advanced practitioner order or complex needs related to functional status, cognitive ability, or social support system  Outcome: Progressing     Problem: Knowledge Deficit  Goal: Patient/family/caregiver demonstrates understanding of disease process, treatment plan, medications, and discharge instructions  Description: Complete learning assessment and assess knowledge base    Interventions:  - Provide teaching at level of understanding  - Provide teaching via preferred learning methods  Outcome: Progressing     Problem: RESPIRATORY - ADULT  Goal: Achieves optimal ventilation and oxygenation  Description: INTERVENTIONS:  - Assess for changes in respiratory status  - Assess for changes in mentation and behavior  - Position to facilitate oxygenation and minimize respiratory effort  - Oxygen administered by appropriate delivery if ordered  - Initiate smoking cessation education as indicated  - Encourage broncho-pulmonary hygiene including cough, deep breathe, Incentive Spirometry  - Assess the need for suctioning and aspirate as needed  - Assess and instruct to report SOB or any respiratory difficulty  - Respiratory Therapy support as indicated  Outcome: Progressing     Problem: SAFETY ADULT  Goal: Patient will remain free of falls  Description: INTERVENTIONS:  - Educate patient/family on patient safety including physical limitations  - Instruct patient to call for assistance with activity   - Consult OT/PT to assist with strengthening/mobility   - Keep Call bell within reach  - Keep bed low and locked with side rails adjusted as appropriate  - Keep care items and personal belongings within reach  - Initiate and maintain comfort rounds  - Make Fall Risk Sign visible to staff  - Offer Toileting in advance of need  - Initiate/Maintain bed alarm  - Obtain necessary fall risk management equipment:   - Apply yellow socks and bracelet for high fall risk patients  - Consider moving patient to room near nurses station  Outcome: Progressing  Goal: Maintain or return to baseline ADL function  Description: INTERVENTIONS:  -  Assess patient's ability to carry out ADLs; assess patient's baseline for ADL function and identify physical deficits which impact ability to perform ADLs (bathing, care of mouth/teeth, toileting, grooming, dressing, etc )  - Assess/evaluate cause of self-care deficits   - Assess range of motion  - Assess patient's mobility; develop plan if impaired  - Assess patient's need for assistive devices and provide as appropriate  - Encourage maximum independence but intervene and supervise when necessary  - Involve family in performance of ADLs  - Assess for home care needs following discharge   - Consider OT consult to assist with ADL evaluation and planning for discharge  - Provide patient education as appropriate  Outcome: Progressing  Goal: Maintains/Returns to pre admission functional level  Description: INTERVENTIONS:  - Perform BMAT or MOVE assessment daily    - Set and communicate daily mobility goal to care team and patient/family/caregiver     - Collaborate with rehabilitation services on mobility goals if consulted  Outcome: Progressing

## 2022-10-25 NOTE — ASSESSMENT & PLAN NOTE
· Tested positive for RSV 10/20  · Currently on treatment protocol for COPD exacerbation  · Please see related assessment/plan under COPD exacerbation

## 2022-10-25 NOTE — PLAN OF CARE
Problem: PAIN - ADULT  Goal: Verbalizes/displays adequate comfort level or baseline comfort level  Description: Interventions:  - Encourage patient to monitor pain and request assistance  - Assess pain using appropriate pain scale  - Administer analgesics based on type and severity of pain and evaluate response  - Implement non-pharmacological measures as appropriate and evaluate response  - Consider cultural and social influences on pain and pain management  - Notify physician/advanced practitioner if interventions unsuccessful or patient reports new pain  10/25/2022 1340 by Erendira Aguilar RN  Outcome: Adequate for Discharge  10/25/2022 0726 by Erendira Aguilar RN  Outcome: Progressing     Problem: INFECTION - ADULT  Goal: Absence or prevention of progression during hospitalization  Description: INTERVENTIONS:  - Assess and monitor for signs and symptoms of infection  - Monitor lab/diagnostic results  - Monitor all insertion sites, i e  indwelling lines, tubes, and drains  - Monitor endotracheal if appropriate and nasal secretions for changes in amount and color  - Kell appropriate cooling/warming therapies per order  - Administer medications as ordered  - Instruct and encourage patient and family to use good hand hygiene technique  - Identify and instruct in appropriate isolation precautions for identified infection/condition  10/25/2022 1340 by Erendira Aguilar RN  Outcome: Adequate for Discharge  10/25/2022 0726 by Erendira Aguilar RN  Outcome: Progressing     Problem: DISCHARGE PLANNING  Goal: Discharge to home or other facility with appropriate resources  Description: INTERVENTIONS:  - Identify barriers to discharge w/patient and caregiver  - Arrange for needed discharge resources and transportation as appropriate  - Identify discharge learning needs (meds, wound care, etc )  - Refer to Case Management Department for coordinating discharge planning if the patient needs post-hospital services based on physician/advanced practitioner order or complex needs related to functional status, cognitive ability, or social support system  10/25/2022 1340 by Maria E Langford RN  Outcome: Adequate for Discharge  10/25/2022 0726 by Maria E Langford RN  Outcome: Progressing     Problem: Knowledge Deficit  Goal: Patient/family/caregiver demonstrates understanding of disease process, treatment plan, medications, and discharge instructions  Description: Complete learning assessment and assess knowledge base    Interventions:  - Provide teaching at level of understanding  - Provide teaching via preferred learning methods  10/25/2022 1340 by Maria E Langford RN  Outcome: Adequate for Discharge  10/25/2022 0726 by Maria E Langford RN  Outcome: Progressing     Problem: RESPIRATORY - ADULT  Goal: Achieves optimal ventilation and oxygenation  Description: INTERVENTIONS:  - Assess for changes in respiratory status  - Assess for changes in mentation and behavior  - Position to facilitate oxygenation and minimize respiratory effort  - Oxygen administered by appropriate delivery if ordered  - Initiate smoking cessation education as indicated  - Encourage broncho-pulmonary hygiene including cough, deep breathe, Incentive Spirometry  - Assess the need for suctioning and aspirate as needed  - Assess and instruct to report SOB or any respiratory difficulty  - Respiratory Therapy support as indicated  10/25/2022 1340 by Maria E Langford RN  Outcome: Adequate for Discharge  10/25/2022 0726 by Maria E Langford RN  Outcome: Progressing     Problem: SAFETY ADULT  Goal: Patient will remain free of falls  Description: INTERVENTIONS:  - Educate patient/family on patient safety including physical limitations  - Instruct patient to call for assistance with activity   - Consult OT/PT to assist with strengthening/mobility   - Keep Call bell within reach  - Keep bed low and locked with side rails adjusted as appropriate  - Keep care items and personal belongings within reach  - Initiate and maintain comfort rounds  - Make Fall Risk Sign visible to staff  - Offer Toileting in advance of need  - Initiate/Maintain bed alarm  - Obtain necessary fall risk management equipment:   - Apply yellow socks and bracelet for high fall risk patients  - Consider moving patient to room near nurses station  10/25/2022 1340 by Luis Eden RN  Outcome: Adequate for Discharge  10/25/2022 0726 by Luis Eden RN  Outcome: Progressing  Goal: Maintain or return to baseline ADL function  Description: INTERVENTIONS:  -  Assess patient's ability to carry out ADLs; assess patient's baseline for ADL function and identify physical deficits which impact ability to perform ADLs (bathing, care of mouth/teeth, toileting, grooming, dressing, etc )  - Assess/evaluate cause of self-care deficits   - Assess range of motion  - Assess patient's mobility; develop plan if impaired  - Assess patient's need for assistive devices and provide as appropriate  - Encourage maximum independence but intervene and supervise when necessary  - Involve family in performance of ADLs  - Assess for home care needs following discharge   - Consider OT consult to assist with ADL evaluation and planning for discharge  - Provide patient education as appropriate  10/25/2022 1340 by Luis Eden RN  Outcome: Adequate for Discharge  10/25/2022 0726 by Luis Eden RN  Outcome: Progressing  Goal: Maintains/Returns to pre admission functional level  Description: INTERVENTIONS:  - Perform BMAT or MOVE assessment daily    - Set and communicate daily mobility goal to care team and patient/family/caregiver     - Collaborate with rehabilitation services on mobility goals if consulted  10/25/2022 1340 by Luis Eden RN  Outcome: Adequate for Discharge  10/25/2022 0401 by Luis Eden RN  Outcome: Progressing

## 2022-10-25 NOTE — CASE MANAGEMENT
Case Management Discharge Planning Note    Patient name Jameson Foote  Location /-01 MRN [id number]  : 1945 Date 10/25/2022       Current Admission Date: 10/19/2022  Current Admission Diagnosis:Chronic obstructive pulmonary disease with acute exacerbation [facility])   Patient Active Problem List    Diagnosis Date Noted   • Right lower quadrant abdominal pain 10/23/2022   • RSV (respiratory syncytial virus infection) 10/21/2022   • Obstructive sleep apnea syndrome in adult 10/19/2022   • Sensorineural hearing loss, bilateral 10/19/2022   • Acute on chronic respiratory failure with hypoxia ([address] ) 10/15/2022   • Prostate cancer ([facility] ) 2022   • Elevated MCV 2022   • Cubital tunnel syndrome on left 2022   • Carpal tunnel syndrome on left 2022   • Intercostal neuralgia 2022   • Urinary frequency 2022   • Incomplete bladder emptying 2022   • Chronic bilateral low back pain with right-sided sciatica 2022   • Mid back pain 2022   • Thoracic radiculopathy 2022   • Chronic pain syndrome 2022   • Stage 3a chronic kidney disease ([address] ) 2022   • Hoarseness or changing voice 2022   • Chronic right-sided thoracic back pain 2022   • Primary insomnia 2022   • Right hip pain 2022   • Abnormal nuclear stress test 2021   • Intermittent chest pain 2021   • Costochondral chest pain 01/15/2021   • Chronic obstructive pulmonary disease with acute exacerbation ([address] ) 2021   • Oxygen dependent 2021   • Depression 2021   • S/P carotid endarterectomy 2021   • Stented coronary artery 2021   • Vertigo 2021   • Anisometropia 2021   • Osteoarthritis of spinal facet joint 2021   • Chronic ischemic heart disease 2021   • Chronic diastolic (congestive) heart failure ([address] ) 2021   • Diverticular disease of colon 2021   • Dry eye syndrome 2021   • Gastroesophageal reflux disease 01/11/2021   • Hearing loss 01/11/2021   • Elevated PSA 01/11/2021   • Hypoxia 01/11/2021   • Lens replaced by other means 01/11/2021   • Lumbar radiculopathy 01/11/2021   • Multinodular goiter 01/11/2021   • Nuclear senile cataract 01/11/2021   • Obesity 01/11/2021   • Occlusion and stenosis of carotid artery 01/11/2021   • Osteoarthritis of hip 01/11/2021   • Prediabetes 01/11/2021   • Psychosexual dysfunction with inhibited sexual excitement 01/11/2021   • Sleep apnea 01/11/2021   • Solitary pulmonary nodule 01/11/2021   • Thyroid nodule 01/11/2021   • Guyon syndrome, 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   • Hyperlipidemia    • Coronary artery disease involving native coronary artery of native heart without angina pectoris    • Bilateral carotid artery stenosis    • Hypertension       LOS (days): 5  Geometric Mean LOS (GMLOS) (days): 4 20  Days to GMLOS:-0 9     OBJECTIVE:  Risk of Unplanned Readmission Score: 26 09         Current admission status: Inpatient   Preferred Pharmacy:   [address] [place] [address], [address] [place] [address] [address]  [address]   [address] Diana FANG [address]  Phone: [phone] Fax: [phone] [address] [address]  Phone: [phone] Fax: [phone]    Primary Care Provider: Bubba Villarreal,     Primary Insurance: [employer] [facility]  Secondary Insurance:     DISCHARGE DETAILS:    Discharge planning discussed with[de-identified] patient  and daughter was called at 10:47 am     Comments - Freedom of Choice: pt denies any d/c needs,   pt and family are in agreement with the d/c and d/c  plan home with outpt follow up  CM contacted family/caregiver?: Yes  Were Treatment Team discharge recommendations reviewed with patient/caregiver?: Yes  Did patient/caregiver verbalize understanding of patient care needs?: Yes  Were patient/caregiver advised of the risks associated with not following Treatment Team discharge recommendations?: Yes    Contacts  Patient Contacts: Demarco Tee  Relationship to Patient[de-identified] Family (daughter)  Contact Method: Phone  Phone Number: [phone]  Reason/Outcome: Discharge [address]         Is the patient interested in [facility] at discharge?: No    DME Referral Provided  Referral made for DME?: No    Other Referral/Resources/Interventions Provided:  Referral Comments: pt denies any d/c needs  pt will follow up with Pulmonary    Would you like to participate in our [facility] [address] service program?  : No - Declined    Treatment Team Recommendation: Home (home with family and outpt follow up- daughter at 1pm)  Discharge Destination Plan[de-identified] Home (home with family and outpt follow up)  Transport at Discharge : Automobile, Family                       Accompanied by: Family member           Family notified[de-identified] daughter was called  cm attempted to reach rita wife at 6 :39 am [phone] message box was full

## 2022-10-25 NOTE — DISCHARGE SUMMARY
[address]  Discharge- [facility] 1945, 65 y o  male MRN: [id number]  Unit/Bed#: -01 Encounter: [id number]  Primary Care Provider: Marah Alejandro DO   Date and time admitted to hospital: 10/19/2022  2:05 PM    * Chronic obstructive pulmonary disease with acute exacerbation ([place] [address] )  Assessment & Plan  Baseline o2 requirements include 2L NC qhs and intermittently prn throughout the day   · AEB increased O2 requirements due to underlying RSV   · Hold pre-hospital Breo   · Patient recieved Xopenex and Atrovent q 8 hours, Pulmicort 0 5 mg q12 hours during hospitalization  · Patient recieved Solu-Medrol 40 mg IV q 8 hours till 10/25/22 per pulmonology recommendations during hospitalization   · Patient completed a 5 day course of Doxycycline on 12/24/22  · Pulmonology consult appreciated   · Patient discharged on prednisone 40 mg daily which will be decreased by 10 mg every 5 days  He was also switched to nebulized treatment with budesnide q 12 hours and brovana q 12 hours instead of home inhalers per pulmonology recommendations  · Outpatient follow-up with Pulmonology and PCP    RSV (respiratory syncytial virus infection)  Assessment & Plan  · Tested positive for RSV 10/20  · Currently on treatment protocol for COPD exacerbation  · Please see related assessment/plan under COPD exacerbation    Acute on chronic respiratory failure with hypoxia ([place] [address] )  Assessment & Plan  · Secondary to COPD exacerbation being treated as per above  · Patient normally requires 2 L q h s  and as needed during the day  · Patient's oxygen requires increased to 4 L in the ED now decreased back to baseline 2 L      Right lower quadrant abdominal pain  Assessment & Plan  · Patient reported that he coughed and felt a pop in his right lower abdomen  · Pain now improved  · CT A/P negative for acute findings    Obstructive sleep apnea syndrome in adult  Assessment & Plan  · Continue pre-hospital CPAP q h s  Stage 3a chronic kidney disease [facility])  Assessment & Plan  Lab Results   Component Value Date    EGFR 56 10/25/2022    EGFR 53 10/24/2022    EGFR 55 10/23/2022    CREATININE 1 22 10/25/2022    CREATININE 1 27 10/24/2022    CREATININE 1 24 10/23/2022     · Creatinine appears to be approximately baseline  · Slight improvement with IV diuresis    Gastroesophageal reflux disease  Assessment & Plan  · Continue pre-hospital Pepcid 20 mg p o  q h s  Chronic diastolic (congestive) heart failure (HCC)  Assessment & Plan  Wt Readings from Last 3 Encounters:   10/19/22 100 kg (221 lb)   10/18/22 100 kg (221 lb)   10/15/22 98 4 kg (217 lb)     · Does not appear to be in exacerbation at this time, although received IV Lasix 40 mg x2 doses for suspected volume overload  · Examines euvolemic  · Home po Lasix 40 mg qd restarted  · Continue pre-hospital Toprol XL 75 mg p o  daily and 50 mg p o  q h s  Hypertension  Assessment & Plan  · Continue pre-hospital Toprol 75 mg p o  q a m  and 50 mg p o  q h s  and Lasix 40 mg p o  daily    Hyperlipidemia  Assessment & Plan  · Resume pre-hospital Crestor      Medical Problems             Resolved Problems  Date Reviewed: 10/25/2022   None               Discharging Physician / Practitioner: Christine Kaur  PCP: Soni Ramos DO  Admission Date:   Admission Orders (From admission, onward)     Ordered        10/20/22 0940  Inpatient Admission  Once            10/19/22 1914  Place in Observation  Once                      Discharge Date: 10/25/22    Consultations During Hospital Stay:  · Cardiology  · Pulmonology    Procedures Performed:   · none    Significant Findings / Test Results:   CT abdomen pelvis wo contrast    Result Date: 10/23/2022  Impression: 1  No acute inflammatory changes within the right lower quadrant  A normal appendix is visualized  2  Colonic diverticulosis  No bowel obstruction  No hydronephrosis   3  Induration of the anterior abdominal wall subcutaneous tissue likely related to medicinal injection sites but correlate for any clinical concern of cellulitis 4  Pulmonary emphysema and chronic changes at the right lung base Workstation performed: [id number]     XR chest 1 view portable    Result Date: 10/20/2022  · Impression: Emphysema  Scarring or atelectasis at the left lung base  Workstation performed: [id number]   ·     Incidental Findings:   · none     Test Results Pending at Discharge (will require follow up):   · none     Outpatient Tests Requested:  · none    Complications:  none    Reason for Admission: acute on chronic respiratory failure, COPD exacerbation, acute RSV bronchitis     Hospital Course:   Destiny Edmondson is a 65 y o  male patient who originally presented to the hospital on 10/19/2022 due to 5 days of increased shortness of breath  Please see H&P by Pat Miles PA-C dated 10/20/22 for complete details of presentation  The patient was started on IV steroids  Patient initially thought to be volume overloaded and received IV lasix however the patient continued to be sob and required increased level of oxygen supplementation  Cardiology consulted and felt the patient was not volume overload and recommended transitioning back to home lasix  Pulmonology was consulted and ordered COVID/Flu/RSV  The patient tested positive for RSV  The patient remained on IV steroids for 5 days per pulmonology recommendations  His breathing improved and his O2 requirements decreased back to his baseline of 2L  The patient was then discharged on a slow prednisone taper of 40 mg daily which will be decreased by 10 mg every 5 days  The patient was also instructed to hold his home inhalers and he was prescribed neb treatments of budesonide q 12 and Brovana q 12 per pulmonology recommendations  The patient was instructed to follow-up with pulmonology and PCP on discharge  Please see above list of diagnoses and related plan for additional information  Condition at Discharge: fair    Discharge Day Visit / Exam:   Subjective:    Vitals: Blood Pressure: 122/65 (10/25/22 0733)  Pulse: 78 (10/25/22 0733)  Temperature: 97 5 °F (36 4 °C) (10/25/22 0733)  Temp Source: Oral (10/21/22 2216)  Respirations: 22 (10/25/22 0733)  Height: 5' 8" (172 7 cm) (10/19/22 1401)  Weight - Scale: 100 kg (221 lb) (10/19/22 1401)  SpO2: (!) 88 % (10/25/22 0733)  Exam:   Physical Exam  Vitals and nursing note reviewed  Constitutional:       Appearance: Normal appearance  HENT:      Head: Normocephalic and atraumatic  Cardiovascular:      Rate and Rhythm: Normal rate and regular rhythm  Pulses: Normal pulses  Heart sounds: Normal heart sounds  Pulmonary:      Effort: Pulmonary effort is normal       Breath sounds: Normal breath sounds  Abdominal:      Palpations: Abdomen is soft  Skin:     General: Skin is warm and dry  Neurological:      General: No focal deficit present  Mental Status: He is alert and oriented to person, place, and time  Psychiatric:         Mood and Affect: Mood normal          Behavior: Behavior normal          Discussion with Family: Patient declined call to   Discharge instructions/Information to patient and family:   See after visit summary for information provided to patient and family  Provisions for Follow-Up Care:  See after visit summary for information related to follow-up care and any pertinent home health orders  Disposition:   Home    Planned Readmission: no     Discharge Statement:  I spent 25 minutes discharging the patient  This time was spent on the day of discharge  I had direct contact with the patient on the day of discharge  Greater than 50% of the total time was spent examining patient, answering all patient questions, arranging and discussing plan of care with patient as well as directly providing post-discharge instructions    Additional time then spent on discharge activities  Discharge Medications:  See after visit summary for reconciled discharge medications provided to patient and/or family        **Please Note: This note may have been constructed using a voice recognition system**

## 2022-10-25 NOTE — ASSESSMENT & PLAN NOTE
· Secondary to COPD exacerbation being treated as per above  · Patient normally requires 2 L q h s  and as needed during the day  · Patient's oxygen requires increased to 4 L in the ED now decreased back to baseline 2 L

## 2022-10-25 NOTE — ASSESSMENT & PLAN NOTE
· Patient reported that he coughed and felt a pop in his right lower abdomen  · Pain now improved  · CT A/P negative for acute findings

## 2022-10-25 NOTE — UTILIZATION REVIEW
Continued Stay Review    Date: 10/22-10/25                          Current Patient Class: Inpatient   Current Level of Care: Med/surg    HPI:77 y o  male initially admitted on 10/20     Assessment/Plan:     UPdate 10/24: Using 3LNC today  Continue solumedrol, nebulizers  Continue lasix  Decreased air movement  Pulmonary consult:  Switch to po steroids tomorrow  Lungs: normal respiratory effort, mild/faint end expiratory wheeze  Update 10/23: Oxygen titrated down to [facility]  COntinue doxycycline  Continues with RLQ abdominal pain exacerbated by cough or laughing  NO bulging/masses at the site  Wheezing and rhonchi lung sounds  Update 10/22: COPD  NOw requiring  4LNC  Hold pre hospital breo  RSV +  Continue with Steroid taper  Continue with Lasix IV  Rhonchi heard in lungs  PUlmonary:  Less wheezing  Taper prednisone     Vital Signs:   Date/Time Temp Pulse Resp BP MAP (mmHg) SpO2 Calculated FIO2 (%) - Nasal Cannula Nasal Cannula O2 Flow Rate (L/min) O2 Device   10/25/22 07:33:45 97 5 °F (36 4 °C) 78 22 122/65 84 88 % Abnormal  -- -- --   10/25/22 0704 -- -- -- -- -- 85 % Abnormal  -- -- --   10/25/22 05:14:36 -- 81 16 123/39 Abnormal  67 86 % Abnormal  -- -- --   10/24/22 22:22:10 96 9 °F (36 1 °C) Abnormal  99 22 134/83 100 88 % Abnormal  -- -- --   10/24/22 21:52:53 98 3 °F (36 8 °C) 99 18 140/78 99 87 % Abnormal  -- -- --   10/24/22 2052 -- 89 18 -- -- 90 % 28 2 L/min Nasal cannula   10/24/22 15:12:06 97 7 °F (36 5 °C) 98 22 136/69 91 91 % 32 3 L/min Nasal cannula   10/24/22 1313 -- -- -- -- -- 95 % 32 3 L/min Nasal cannula   10/24/22 09:53:11 96 8 °F (36 °C) Abnormal  73 18 110/66 81 91 % 32 3 L/min CPAP    O2 Device: NC at 10/24/22 0953   10/24/22 0716 -- -- -- -- -- 89 % Abnormal  -- -- --   10/24/22 05:30:20 -- 80 18 99/71 80 88 % Abnormal  -- -- --   10/23/22 2210 -- -- -- -- -- 91 % 32 3 L/min Nasal cannula   10/23/22 22:04:21 96 7 °F (35 9 °C) Abnormal  89 18 128/72 91 89 % Abnormal  -- -- -- 10/23/22 14:50:57 -- 95 18 130/71 91 92 % -- -- --   10/23/22 1317 -- -- -- -- -- 92 % 32 3 L/min  Nasal cannula   Nasal Cannula O2 Flow Rate (L/min): Found on 3 liters at 10/23/22 1317   10/23/22 0945 -- -- -- -- -- -- 32 3 L/min Nasal cannula   10/23/22 0741 -- -- -- -- -- 90 % 36 4 L/min Nasal cannula   10/23/22 07:24:40 96 9 °F (36 1 °C) Abnormal  74 23 Abnormal  110/56 74 84 % Abnormal  -- -- --   10/23/22 0539 -- 80 -- 117/68 -- -- -- -- --         Pertinent Labs/Diagnostic Results:   10/23 CT a/p: No acute inflammatory changes within the right lower quadrant  A normal appendix is visualized  2  Colonic diverticulosis  No bowel obstruction  No hydronephrosis  3  Induration of the anterior abdominal wall subcutaneous tissue likely related to medicinal injection sites but correlate for any clinical concern of cellulitis  4  Pulmonary emphysema and chronic changes at the right lung base       Results from last 7 days   Lab Units 10/20/22  1531   SARS-COV-2  Negative     Results from last 7 days   Lab Units 10/25/22  0421 10/24/22  0434 10/23/22  0543 10/22/22  0542 10/21/22  0502 10/20/22  0407 10/19/22  1443   WBC Thousand/uL 19 22* 18 63* 18 15* 16 30* 14 95*   < > 9 38   HEMOGLOBIN g/dL 17 7* 18 2* 18 3* 18 4* 18 2*   < > 18 2*   HEMATOCRIT % 57 0* 58 3* 57 5* 56 5* 56 8*   < > 57 5*   PLATELETS Thousands/uL 190 185 174 175 162   < > 155   NEUTROS ABS Thousands/µL  --   --   --   --  12 92*  --  7 69*    < > = values in this interval not displayed           Results from last 7 days   Lab Units 10/25/22  0421 10/24/22  0434 10/23/22  0543 10/22/22  0542 10/21/22  0502   SODIUM mmol/L 136 136 138 137 137   POTASSIUM mmol/L 4 8 4 5 4 6 4 5 4 2   CHLORIDE mmol/L 96 95* 95* 97 98   CO2 mmol/L 33* 34* 35* 33* 33*   ANION GAP mmol/L 7 7 8 7 6   BUN mg/dL 38* 39* 39* 40* 37*   CREATININE mg/dL 1 22 1 27 1 24 1 23 1 23   EGFR ml/min/1 73sq m 56 53 55 55 55   CALCIUM mg/dL 8 2* 8 4 8 3* 8 1* 8 7     Results from last 7 days   Lab Units 10/19/22  1443   AST U/L 34   ALT U/L 33   ALK PHOS U/L 72   TOTAL PROTEIN g/dL 6 4   ALBUMIN g/dL 4 1   TOTAL BILIRUBIN mg/dL 0 74         Results from last 7 days   Lab Units 10/25/22  0421 10/24/22  0434 10/23/22  0543 10/22/22  0542 10/21/22  0502 10/20/22  0407 10/19/22  1443   GLUCOSE RANDOM mg/dL 176* 181* 162* 161* 156* 163* 108       Results from last 7 days   Lab Units 10/19/22  1732 10/19/22  1443   HS TNI 0HR ng/L  --  24   HS TNI 2HR ng/L 21  --    HSTNI D2 ng/L -3  --        Results from last 7 days   Lab Units 10/20/22  0407   PROCALCITONIN ng/ml 0 15       Results from last 7 days   Lab Units 10/19/22  1443   BNP pg/mL 97       Results from last 7 days   Lab Units 10/20/22  1531   INFLUENZA A PCR  Negative   INFLUENZA B PCR  Negative   RSV PCR  Positive*         Medications:   Scheduled Medications:  aspirin, 81 mg, Oral, Daily  atorvastatin, 80 mg, Oral, Daily With Dinner  budesonide, 0 5 mg, Nebulization, Q12H  famotidine, 20 mg, Oral, HS  fluticasone, 2 spray, Each Nare, Daily  furosemide, 40 mg, Oral, Daily  gabapentin, 300 mg, Oral, TID  heparin (porcine), 5,000 Units, Subcutaneous, Q8H SVETLANA  ipratropium, 0 5 mg, Nebulization, TID  levalbuterol, 1 25 mg, Nebulization, TID  methylPREDNISolone sodium succinate, 40 mg, Intravenous, Q8H  metoprolol succinate, 50 mg, Oral, HS  metoprolol succinate, 75 mg, Oral, Early Morning  multivitamin stress formula, 1 tablet, Oral, Daily  potassium chloride, 20 mEq, Oral, Daily      Continuous IV Infusions:     PRN Meds:  acetaminophen, 650 mg, Oral, Q6H PRN  al mag oxide-diphenhydramine-lidocaine viscous, 10 mL, Swish & Swallow, Q4H PRN  aluminum-magnesium hydroxide-simethicone, 30 mL, Oral, Q4H PRN  bismuth subsalicylate, 138 mg, Oral, Q6H PRN  guaiFENesin, 200 mg, Oral, Q4H PRN  nitroglycerin, 0 4 mg, Sublingual, Q5 Min PRN  phenol, 1 spray, Mouth/Throat, Q2H PRN        Discharge Plan: TBD    Network Utilization Review Department  ATTENTION: Please call with any questions or concerns to [phone] and carefully listen to the prompts so that you are directed to the right person  All voicemails are confidential   Shawna Cobos all requests for admission clinical reviews, approved or denied determinations and any other requests to dedicated fax number below belonging to the campus where the patient is receiving treatment   List of dedicated fax numbers for the Facilities:  [address] DENIALS (Administrative/Medical Necessity) [phone]   [address] (Maternity/NICU/Pediatrics) [phone]   1 [address] [phone]   [facility] 77 [phone]   [address] [address] [facility] 28 [phone]   1555 [facility] 134 815 [facility] [phone]

## 2022-10-25 NOTE — PLAN OF CARE
Problem: PAIN - ADULT  Goal: Verbalizes/displays adequate comfort level or baseline comfort level  Description: Interventions:  - Encourage patient to monitor pain and request assistance  - Assess pain using appropriate pain scale  - Administer analgesics based on type and severity of pain and evaluate response  - Implement non-pharmacological measures as appropriate and evaluate response  - Consider cultural and social influences on pain and pain management  - Notify physician/advanced practitioner if interventions unsuccessful or patient reports new pain  10/25/2022 0045 by Valentino Pilsner, RN  Outcome: Progressing  10/25/2022 0042 by Valentino Pilsner, RN  Outcome: Progressing     Problem: INFECTION - ADULT  Goal: Absence or prevention of progression during hospitalization  Description: INTERVENTIONS:  - Assess and monitor for signs and symptoms of infection  - Monitor lab/diagnostic results  - Monitor all insertion sites, i e  indwelling lines, tubes, and drains  - Monitor endotracheal if appropriate and nasal secretions for changes in amount and color  - Thelma appropriate cooling/warming therapies per order  - Administer medications as ordered  - Instruct and encourage patient and family to use good hand hygiene technique  - Identify and instruct in appropriate isolation precautions for identified infection/condition  10/25/2022 0045 by Valentino Pilsner, RN  Outcome: Progressing  10/25/2022 0042 by Valentino Pilsner, RN  Outcome: Progressing     Problem: DISCHARGE PLANNING  Goal: Discharge to home or other facility with appropriate resources  Description: INTERVENTIONS:  - Identify barriers to discharge w/patient and caregiver  - Arrange for needed discharge resources and transportation as appropriate  - Identify discharge learning needs (meds, wound care, etc )  - Refer to Case Management Department for coordinating discharge planning if the patient needs post-hospital services based on physician/advanced practitioner order or complex needs related to functional status, cognitive ability, or social support system  10/25/2022 0045 by Merline Bollman, RN  Outcome: Progressing  10/25/2022 0042 by Merline Bollman, RN  Outcome: Progressing     Problem: Knowledge Deficit  Goal: Patient/family/caregiver demonstrates understanding of disease process, treatment plan, medications, and discharge instructions  Description: Complete learning assessment and assess knowledge base    Interventions:  - Provide teaching at level of understanding  - Provide teaching via preferred learning methods  10/25/2022 0045 by Merline Bollman, RN  Outcome: Progressing  10/25/2022 0042 by Merline Bollman, RN  Outcome: Progressing     Problem: RESPIRATORY - ADULT  Goal: Achieves optimal ventilation and oxygenation  Description: INTERVENTIONS:  - Assess for changes in respiratory status  - Assess for changes in mentation and behavior  - Position to facilitate oxygenation and minimize respiratory effort  - Oxygen administered by appropriate delivery if ordered  - Initiate smoking cessation education as indicated  - Encourage broncho-pulmonary hygiene including cough, deep breathe, Incentive Spirometry  - Assess the need for suctioning and aspirate as needed  - Assess and instruct to report SOB or any respiratory difficulty  - Respiratory Therapy support as indicated  10/25/2022 0045 by Merline Bollman, RN  Outcome: Progressing  10/25/2022 0042 by Merline Bollman, RN  Outcome: Progressing     Problem: SAFETY ADULT  Goal: Patient will remain free of falls  Description: INTERVENTIONS:  - Educate patient/family on patient safety including physical limitations  - Instruct patient to call for assistance with activity   - Consult OT/PT to assist with strengthening/mobility   - Keep Call bell within reach  - Keep bed low and locked with side rails adjusted as appropriate  - Keep care items and personal belongings within reach  - Initiate and maintain comfort rounds  - Make Fall Risk Sign visible to staff  - Offer Toileting in advance of need  - Initiate/Maintain bed alarm  - Obtain necessary fall risk management equipment:   - Apply yellow socks and bracelet for high fall risk patients  - Consider moving patient to room near nurses station  Outcome: Progressing  Goal: Maintain or return to baseline ADL function  Description: INTERVENTIONS:  -  Assess patient's ability to carry out ADLs; assess patient's baseline for ADL function and identify physical deficits which impact ability to perform ADLs (bathing, care of mouth/teeth, toileting, grooming, dressing, etc )  - Assess/evaluate cause of self-care deficits   - Assess range of motion  - Assess patient's mobility; develop plan if impaired  - Assess patient's need for assistive devices and provide as appropriate  - Encourage maximum independence but intervene and supervise when necessary  - Involve family in performance of ADLs  - Assess for home care needs following discharge   - Consider OT consult to assist with ADL evaluation and planning for discharge  - Provide patient education as appropriate  Outcome: Progressing  Goal: Maintains/Returns to pre admission functional level  Description: INTERVENTIONS:  - Perform BMAT or MOVE assessment daily    - Set and communicate daily mobility goal to care team and patient/family/caregiver     - Collaborate with rehabilitation services on mobility goals if consulted  Outcome: Progressing

## 2022-10-26 ENCOUNTER — RA CDI HCC (OUTPATIENT)
Dept: OTHER | Facility: HOSPITAL | Age: 77
End: 2022-10-26

## 2022-10-26 NOTE — UTILIZATION REVIEW
NOTIFICATION OF ADMISSION DISCHARGE   This is a Notification of Discharge from [address]  Please be advised that this patient has been discharge from our facility  Below you will find the admission and discharge date and time including the patient’s disposition  UTILIZATION REVIEW CONTACT:  Nam Tripathi  Utilization   Network Utilization Review Department  Phone: [phone] carefully listen to the prompts  All voicemails are confidential   Email: [email or web address]     ADMISSION INFORMATION  PRESENTATION DATE: 10/19/2022  2:05 PM  OBERVATION ADMISSION DATE:   INPATIENT ADMISSION DATE: 10/20/22  9:40 AM   DISCHARGE DATE: 10/25/2022  1:41 PM  DISPOSITION: Home/Self Care Home/Self Care      IMPORTANT INFORMATION:  Send all requests for admission clinical reviews, approved or denied determinations and any other requests to dedicated fax number below belonging to the campus where the patient is receiving treatment   List of dedicated fax numbers:  [address] DENIALS (Administrative/Medical Necessity) [phone]   [address] (Maternity/NICU/Pediatrics) [phone]   [facility] [phone]   [place] [address] [phone]   [address]  [phone]   220 [facility] [phone]   [address] [place] [address] [phone]   [address] [place] [address] [phone]   [facility]  [phone] 4058 [facility] [phone] 412 [facility] 850 [address] [facility] [phone]

## 2022-10-26 NOTE — PROGRESS NOTES
[facility] 75  coding opportunities     I13 0 and I42 9     Chart Reviewed number of suggestions sent to Provider: 2     Patients Insurance     Medicare Insurance: [address] [place] [id number]

## 2022-10-27 NOTE — UTILIZATION REVIEW
Continued Stay Review    Date: 10/22-10/25                          Current Patient Class: Inpatient   Current Level of Care: Med/surg    HPI:77 y o  male initially admitted on 10/20     Assessment/Plan:     UPdate 10/24: Using 3LNC today  Continue solumedrol, nebulizers  Continue lasix  Decreased air movement  Pulmonary consult:  Switch to po steroids tomorrow  Lungs: normal respiratory effort, mild/faint end expiratory wheeze  Update 10/23: Oxygen titrated down to [facility]  COntinue doxycycline  Continues with RLQ abdominal pain exacerbated by cough or laughing  NO bulging/masses at the site  Wheezing and rhonchi lung sounds  Update 10/22: COPD  NOw requiring  4LNC  Hold pre hospital breo  RSV +  Continue with Steroid taper  Continue with Lasix IV  Rhonchi heard in lungs  PUlmonary:  Less wheezing  Taper prednisone     Vital Signs:   Date/Time Temp Pulse Resp BP MAP (mmHg) SpO2 Calculated FIO2 (%) - Nasal Cannula Nasal Cannula O2 Flow Rate (L/min) O2 Device   10/25/22 07:33:45 97 5 °F (36 4 °C) 78 22 122/65 84 88 % Abnormal  -- -- --   10/25/22 0704 -- -- -- -- -- 85 % Abnormal  -- -- --   10/25/22 05:14:36 -- 81 16 123/39 Abnormal  67 86 % Abnormal  -- -- --   10/24/22 22:22:10 96 9 °F (36 1 °C) Abnormal  99 22 134/83 100 88 % Abnormal  -- -- --   10/24/22 21:52:53 98 3 °F (36 8 °C) 99 18 140/78 99 87 % Abnormal  -- -- --   10/24/22 2052 -- 89 18 -- -- 90 % 28 2 L/min Nasal cannula   10/24/22 15:12:06 97 7 °F (36 5 °C) 98 22 136/69 91 91 % 32 3 L/min Nasal cannula   10/24/22 1313 -- -- -- -- -- 95 % 32 3 L/min Nasal cannula   10/24/22 09:53:11 96 8 °F (36 °C) Abnormal  73 18 110/66 81 91 % 32 3 L/min CPAP    O2 Device: NC at 10/24/22 0953   10/24/22 0716 -- -- -- -- -- 89 % Abnormal  -- -- --   10/24/22 05:30:20 -- 80 18 99/71 80 88 % Abnormal  -- -- --   10/23/22 2210 -- -- -- -- -- 91 % 32 3 L/min Nasal cannula   10/23/22 22:04:21 96 7 °F (35 9 °C) Abnormal  89 18 128/72 91 89 % Abnormal  -- -- -- 10/23/22 14:50:57 -- 95 18 130/71 91 92 % -- -- --   10/23/22 1317 -- -- -- -- -- 92 % 32 3 L/min  Nasal cannula   Nasal Cannula O2 Flow Rate (L/min): Found on 3 liters at 10/23/22 1317   10/23/22 0945 -- -- -- -- -- -- 32 3 L/min Nasal cannula   10/23/22 0741 -- -- -- -- -- 90 % 36 4 L/min Nasal cannula   10/23/22 07:24:40 96 9 °F (36 1 °C) Abnormal  74 23 Abnormal  110/56 74 84 % Abnormal  -- -- --   10/23/22 0539 -- 80 -- 117/68 -- -- -- -- --         Pertinent Labs/Diagnostic Results:   10/23 CT a/p: No acute inflammatory changes within the right lower quadrant  A normal appendix is visualized  2  Colonic diverticulosis  No bowel obstruction  No hydronephrosis  3  Induration of the anterior abdominal wall subcutaneous tissue likely related to medicinal injection sites but correlate for any clinical concern of cellulitis  4    Pulmonary emphysema and chronic changes at the right lung base           Results from last 7 days   Lab Units 10/25/22  0421 10/24/22  0434 10/23/22  0543 10/22/22  0542 10/21/22  0502   WBC Thousand/uL 19 22* 18 63* 18 15* 16 30* 14 95*   HEMOGLOBIN g/dL 17 7* 18 2* 18 3* 18 4* 18 2*   HEMATOCRIT % 57 0* 58 3* 57 5* 56 5* 56 8*   PLATELETS Thousands/uL 190 185 174 175 162   NEUTROS ABS Thousands/µL  --   --   --   --  12 92*         Results from last 7 days   Lab Units 10/25/22  0421 10/24/22  0434 10/23/22  0543 10/22/22  0542 10/21/22  0502   SODIUM mmol/L 136 136 138 137 137   POTASSIUM mmol/L 4 8 4 5 4 6 4 5 4 2   CHLORIDE mmol/L 96 95* 95* 97 98   CO2 mmol/L 33* 34* 35* 33* 33*   ANION GAP mmol/L 7 7 8 7 6   BUN mg/dL 38* 39* 39* 40* 37*   CREATININE mg/dL 1 22 1 27 1 24 1 23 1 23   EGFR ml/min/1 73sq m 56 53 55 55 55   CALCIUM mg/dL 8 2* 8 4 8 3* 8 1* 8 7             Results from last 7 days   Lab Units 10/25/22  0421 10/24/22  0434 10/23/22  0543 10/22/22  0542 10/21/22  0502   GLUCOSE RANDOM mg/dL 176* 181* 162* 161* 156*                                 Medications: Scheduled Medications:  aspirin, 81 mg, Oral, Daily  atorvastatin, 80 mg, Oral, Daily With Dinner  budesonide, 0 5 mg, Nebulization, Q12H  famotidine, 20 mg, Oral, HS  fluticasone, 2 spray, Each Nare, Daily  furosemide, 40 mg, Oral, Daily  gabapentin, 300 mg, Oral, TID  heparin (porcine), 5,000 Units, Subcutaneous, Q8H SVETLANA  ipratropium, 0 5 mg, Nebulization, TID  levalbuterol, 1 25 mg, Nebulization, TID  methylPREDNISolone sodium succinate, 40 mg, Intravenous, Q8H  metoprolol succinate, 50 mg, Oral, HS  metoprolol succinate, 75 mg, Oral, Early Morning  multivitamin stress formula, 1 tablet, Oral, Daily  potassium chloride, 20 mEq, Oral, Daily      Continuous IV Infusions:  No current facility-administered medications for this encounter  PRN Meds:  No current facility-administered medications for this encounter  Discharge Plan: [facility]    Network Utilization Review Department  ATTENTION: Please call with any questions or concerns to [phone] and carefully listen to the prompts so that you are directed to the right person  All voicemails are confidential   [place] [facility] all requests for admission clinical reviews, approved or denied determinations and any other requests to dedicated fax number below belonging to the campus where the patient is receiving treatment   List of dedicated fax numbers for the Facilities:  [address] DENIALS (Administrative/Medical Necessity) [phone]   [address] (Maternity/NICU/Pediatrics) [phone]   914 [address] [phone]   [facility]  [phone]   [facility]6 [address] [facility] [place] [facility] [address] [phone]

## 2022-10-28 ENCOUNTER — OFFICE VISIT (OUTPATIENT)
Dept: FAMILY MEDICINE CLINIC | Facility: CLINIC | Age: 77
End: 2022-10-28

## 2022-10-28 VITALS
HEIGHT: 68 IN | RESPIRATION RATE: 14 BRPM | WEIGHT: 221 LBS | HEART RATE: 85 BPM | BODY MASS INDEX: 33.49 KG/M2 | OXYGEN SATURATION: 94 % | SYSTOLIC BLOOD PRESSURE: 110 MMHG | TEMPERATURE: 97.1 F | DIASTOLIC BLOOD PRESSURE: 64 MMHG
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
## 2022-10-28 DIAGNOSIS — R68.89 EXERCISE INTOLERANCE: ICD-10-CM

## 2022-10-28 DIAGNOSIS — I25.9 CHRONIC ISCHEMIC HEART DISEASE: ICD-10-CM

## 2022-10-28 DIAGNOSIS — G89.29 CHRONIC BILATERAL LOW BACK PAIN WITH RIGHT-SIDED SCIATICA: ICD-10-CM

## 2022-10-28 DIAGNOSIS — N18.31 STAGE 3A CHRONIC KIDNEY DISEASE (HCC): ICD-10-CM

## 2022-10-28 DIAGNOSIS — F33.9 DEPRESSION, RECURRENT (HCC): ICD-10-CM

## 2022-10-28 DIAGNOSIS — Z99.81 OXYGEN DEPENDENT: ICD-10-CM

## 2022-10-28 DIAGNOSIS — I50.32 CHRONIC DIASTOLIC (CONGESTIVE) HEART FAILURE (HCC): ICD-10-CM

## 2022-10-28 DIAGNOSIS — M54.41 CHRONIC BILATERAL LOW BACK PAIN WITH RIGHT-SIDED SCIATICA: ICD-10-CM

## 2022-10-28 DIAGNOSIS — J44.1 CHRONIC OBSTRUCTIVE PULMONARY DISEASE WITH ACUTE EXACERBATION (HCC): Primary | ICD-10-CM

## 2022-10-28 DIAGNOSIS — I25.10 CORONARY ARTERY DISEASE INVOLVING NATIVE CORONARY ARTERY OF NATIVE HEART WITHOUT ANGINA PECTORIS: ICD-10-CM

## 2022-10-28 DIAGNOSIS — M47.819: ICD-10-CM

## 2022-10-28 NOTE — PROGRESS NOTES
[facility]        NAME: Artmaye Amaya is a 65 y o  male  : 1945    MRN: [id number]  DATE: 2022  TIME: 2:40 PM    Assessment and Plan   Chronic obstructive pulmonary disease with acute exacerbation ([address] ) [J44 1]  1  Chronic obstructive pulmonary disease with acute exacerbation ([address] )  Motorized Scooter   2  Coronary artery disease involving native coronary artery of native heart without angina pectoris  Motorized Scooter   3  Chronic diastolic (congestive) heart failure ([address] )  Motorized Scooter   4  Oxygen dependent  Motorized Scooter   5  Chronic ischemic heart disease  Motorized Scooter   6  Chronic bilateral low back pain with right-sided sciatica  Motorized Scooter   7  Fall, initial encounter  Motorized Scooter   8  Exercise intolerance  Motorized Scooter   9  Stage 3a chronic kidney disease ([address] )  Motorized Scooter   10  Osteoarthritis of spinal facet joint  Motorized Scooter   11  Depression, recurrent ([address] )       1  Chronic obstructive pulmonary disease with acute exacerbation ([address] )    - Motorized Scooter    2  Coronary artery disease involving native coronary artery of native heart without angina pectoris    - Motorized Scooter    3  Chronic diastolic (congestive) heart failure ([address] )    - Motorized Scooter    4  Oxygen dependent  - 2-3L NC at all times  - Motorized Scooter    5  Chronic ischemic heart disease    - Motorized Scooter    6  Chronic bilateral low back pain with right-sided sciatica    - Motorized Scooter    7  Fall, initial encounter  One fall reported, leg gave out in the last 6 months  - Motorized Scooter    8  Exercise intolerance  SOB with any activity, trouble completing ADL's, walking in his house  - Motorized Scooter    9  Stage 3a chronic kidney disease ([address] )    - Motorized Scooter    10  Osteoarthritis of spinal facet joint    - Motorized Scooter    11   Depression, recurrent [facility]        Patient Instructions     There are no Patient Instructions on file for this visit  Chief Complaint     Chief Complaint   Patient presents with   • Follow-up     Hospital follow up  Tired often, hurts to walk  History of Present Illness       Patient here today for hospital follow up visit  Improving slightly  Has ongoing SOB with ambulation, getting into bed, trouble completing  ADL's  Also having ambulatory dysfunction elder to COPD  Repots he gets out of breath getting into bed, after sitting for about 10 min can catch his breath  Showering every other day due to fatigue  Shaving every 3 days due to SOB and fatigue  Trouble walking even 100ft is difficult and needs to rest every 10 feet  Hospital Course:   Meghan Chu is a 65 y o  male patient who originally presented to the hospital on 10/19/2022 due to 5 days of increased shortness of breath  Please see H&P by Charles Martinez PA-C dated 10/20/22 for complete details of presentation  The patient was started on IV steroids  Patient initially thought to be volume overloaded and received IV lasix however the patient continued to be sob and required increased level of oxygen supplementation  Cardiology consulted and felt the patient was not volume overload and recommended transitioning back to home lasix  Pulmonology was consulted and ordered COVID/Flu/RSV  The patient tested positive for RSV  The patient remained on IV steroids for 5 days per pulmonology recommendations  His breathing improved and his O2 requirements decreased back to his baseline of 2L  The patient was then discharged on a slow prednisone taper of 40 mg daily which will be decreased by 10 mg every 5 days  The patient was also instructed to hold his home inhalers and he was prescribed neb treatments of budesonide q 12 and Brovana q 12 per pulmonology recommendations  The patient was instructed to follow-up with pulmonology and PCP on discharge         [address]  Discharge- Meghan Chu 1945, 65 y o  male MRN: [id number]  Unit/Bed#: -01 Encounter: [id number]  Primary Care Provider: Amy Tovar DO   Date and time admitted to hospital: 10/19/2022  2:05 PM     * Chronic obstructive pulmonary disease with acute exacerbation (HCC)  Assessment & Plan  Baseline o2 requirements include 2L NC qhs and intermittently prn throughout the day   · AEB increased O2 requirements due to underlying RSV   · Hold pre-hospital Breo   · Patient recieved Xopenex and Atrovent q 8 hours, Pulmicort 0 5 mg q12 hours during hospitalization  · Patient recieved Solu-Medrol 40 mg IV q 8 hours till 10/25/22 per pulmonology recommendations during hospitalization   · Patient completed a 5 day course of Doxycycline on 12/24/22  · Pulmonology consult appreciated   · Patient discharged on prednisone 40 mg daily which will be decreased by 10 mg every 5 days  He was also switched to nebulized treatment with budesnide q 12 hours and brovana q 12 hours instead of home inhalers per pulmonology recommendations     · Outpatient follow-up with Pulmonology and PCP     RSV (respiratory syncytial virus infection)  Assessment & Plan  · Tested positive for RSV 10/20  · Currently on treatment protocol for COPD exacerbation  · Please see related assessment/plan under COPD exacerbation     Acute on chronic respiratory failure with hypoxia ([address] )  Assessment & Plan  · Secondary to COPD exacerbation being treated as per above  · Patient normally requires 2 L q h s  and as needed during the day  · Patient's oxygen requires increased to 4 L in the ED now decreased back to baseline 2 L      Right lower quadrant abdominal pain  Assessment & Plan  · Patient reported that he coughed and felt a pop in his right lower abdomen  · Pain now improved  · CT A/P negative for acute findings     Obstructive sleep apnea syndrome in adult  Assessment & Plan  · Continue pre-hospital CPAP q h s      Stage 3a chronic kidney disease ([address] )  Assessment & Plan    Lab Results  Component Value Date    EGFR 56 10/25/2022    EGFR 53 10/24/2022    EGFR 55 10/23/2022    CREATININE 1 22 10/25/2022    CREATININE 1 27 10/24/2022    CREATININE 1 24 10/23/2022     · Creatinine appears to be approximately baseline  · Slight improvement with IV diuresis     Gastroesophageal reflux disease  Assessment & Plan  · Continue pre-hospital Pepcid 20 mg p o  q h s      Chronic diastolic (congestive) heart failure (HCC)  Assessment & Plan    Wt Readings from Last 3 Encounters:  10/19/22 100 kg (221 lb)  10/18/22 100 kg (221 lb)  10/15/22 98 4 kg (217 lb)     · Does not appear to be in exacerbation at this time, although received IV Lasix 40 mg x2 doses for suspected volume overload  · Examines euvolemic  · Home po Lasix 40 mg qd restarted  · Continue pre-hospital Toprol XL 75 mg p o  daily and 50 mg p o  q h s            Hypertension  Assessment & Plan  · Continue pre-hospital Toprol 75 mg p o  q a m  and 50 mg p o  q h s  and Lasix 40 mg p o  daily     Hyperlipidemia  Assessment & Plan  · Resume pre-hospital Crestor           TCM Call     Date and time call was made  10/25/2022  2:44 PM    Patient was hospitialized at  [address]    Date of Admission  10/19/22    Date of discharge  10/25/22    Diagnosis  chronic obstructive palmonary disease with acute exacerbation    Disposition  Home    Current Symptoms  Shortness of breath    Cough Severity  Moderate    Shortness of breath severity  Moderate      TCM Call     Post hospital issues  None    Scheduled for follow up? Yes    Did you obtain your prescribed medications  Yes    Do you need help managing your prescriptions or medications  No    Is transportation to your appointment needed  No    I have advised the patient to call PCP with any new or worsening symptoms    Colette TREVINO    Living Arrangements  Children; Friends; Spouse or Significiant other    Support System  Spouse;  Children    The type of support provided Emotional; Physical; Other (comment)    Do you have social support  Yes, as much as I need            Review of Systems   Review of Systems   Constitutional: Positive for fatigue  Negative for diaphoresis and fever  Respiratory: Positive for cough, shortness of breath and wheezing  Negative for chest tightness  Cardiovascular: Negative  Negative for chest pain and palpitations  Musculoskeletal: Positive for arthralgias and gait problem  Neurological: Negative for dizziness, light-headedness and headaches  Psychiatric/Behavioral: Negative  Negative for decreased concentration and dysphoric mood  The patient is not nervous/anxious  PHQ-2/9 Depression Screening    Little interest or pleasure in doing things: 1 - several days  Feeling down, depressed, or hopeless: 1 - several days  Trouble falling or staying asleep, or sleeping too much: 1 - several days  Feeling tired or having little energy: 2 - more than half the days  Poor appetite or overeatin - several days  Feeling bad about yourself - or that you are a failure or have let yourself or your family down: 0 - not at all  Trouble concentrating on things, such as reading the newspaper or watching television: 0 - not at all  Moving or speaking so slowly that other people could have noticed   Or the opposite - being so fidgety or restless that you have been moving around a lot more than usual: 0 - not at all  Thoughts that you would be better off dead, or of hurting yourself in some way: 0 - not at all  PHQ-9 Score: 6   PHQ-9 Interpretation: Mild depression         Current Medications       Current Outpatient Medications:   •  albuterol (2 5 mg/3 mL) 0 083 % nebulizer solution, Take 3 mL (2 5 mg total) by nebulization every 6 (six) hours as needed for wheezing or shortness of breath, Disp: 60 mL, Rfl: 1  •  arformoterol (BROVANA) 15 mcg/2 mL nebulizer solution, Take 2 mL (15 mcg total) by nebulization 2 (two) times a day, Disp: 120 mL, Rfl: 0  • aspirin 81 mg chewable tablet, CHEW ONE TABLET BY MOUTH EVERY DAY, Disp: , Rfl:   •  budesonide (PULMICORT) 0 5 mg/2 mL nebulizer solution, Take 2 mL (0 5 mg total) by nebulization every 12 (twelve) hours Rinse mouth after use , Disp: 120 mL, Rfl: 0  •  famotidine (PEPCID) 20 mg tablet, Take 20 mg by mouth daily at bedtime, Disp: , Rfl:   •  fluticasone (FLONASE) 50 mcg/act nasal spray, into each nostril as needed , Disp: , Rfl:   •  fluticasone-salmeterol (Advair) 500-50 mcg/dose inhaler, Inhale 1 puff in the morning Rinse mouth after use , Disp: , Rfl:   •  furosemide (LASIX) 40 mg tablet, Take 1 tablet (40 mg total) by mouth daily, Disp: 100 tablet, Rfl: 3  •  gabapentin (NEURONTIN) 300 mg capsule, Take 1 capsule (300 mg total) by mouth 3 (three) times a day, Disp: 90 capsule, Rfl: 1  •  Multiple Vitamin (multivitamin) tablet, Take 1 tablet by mouth daily, Disp: , Rfl:   •  nitroglycerin (NITROSTAT) 0 4 mg SL tablet, DISSOLVE ONE TABLET UNDER THE TONGUE  PRN, Disp: , Rfl:   •  potassium chloride (Klor-Con) 10 mEq tablet, Take 2 tablets (20 mEq total) by mouth daily, Disp: 200 tablet, Rfl: 3  •  predniSONE 10 mg tablet, Take 4 tablets (40 mg total) by mouth daily for 5 days, THEN 3 tablets (30 mg total) daily for 5 days, THEN 2 tablets (20 mg total) daily for 5 days, THEN 1 tablet (10 mg total) daily for 5 days   Do not start before October 26, 2022 , Disp: 50 tablet, Rfl: 0  •  rosuvastatin (CRESTOR) 40 MG tablet, Take 1 tablet (40 mg total) by mouth daily, Disp: 100 tablet, Rfl: 3  •  Blood Pressure Monitor KIT, Use daily (Patient taking differently: Use 40 mg daily), Disp: 1 kit, Rfl: 0  •  metoprolol succinate (TOPROL-XL) 25 mg 24 hr tablet, Take 1 tablet (25 mg total) by mouth daily (Patient taking differently: Take 75 mg by mouth daily in the early morning), Disp: 90 tablet, Rfl: 3  •  metoprolol succinate (TOPROL-XL) 50 mg 24 hr tablet, Take 1 tablet (50 mg total) by mouth 2 (two) times a day (Patient taking differently: Take 50 mg by mouth daily at bedtime), Disp: 180 tablet, Rfl: 3  •  Tiotropium Bromide Monohydrate (SPIRIVA RESPIMAT IN), Inhale every morning, Disp: , Rfl:     Current Allergies     Allergies as of 10/28/2022 - Reviewed 10/28/2022   Allergen Reaction Noted   • Lisinopril Swelling and Cough 01/18/2008   • Tetanus antitoxin Anaphylaxis 06/09/2005   • Tetanus toxoid Anaphylaxis and Swelling 01/16/2007            The following portions of the patient's history were reviewed and updated as appropriate: allergies, current medications, past family history, past medical history, past social history, past surgical history and problem list      Past Medical History:   Diagnosis Date   • Bilateral carotid artery stenosis    • Chronic diastolic heart failure (HCC)    • Chronic ischemic heart disease    • Colon polyp    • COPD (chronic obstructive pulmonary disease) ([facility] )    • Coronary artery disease     hx stents, MI, PCI   • CPAP (continuous positive airway pressure) dependence    • Hearing loss    • Hyperlipidemia    • Hypertension    • MI (myocardial infarction) ([facility] ) 1995   • Myocardial infarction ([facility] )    • Pneumonia    • Prostate cancer ([facility] )    • S/P carotid endarterectomy    • Shortness of breath    • Sleep apnea    • Sleep apnea, obstructive    • Stented coronary artery        Past Surgical History:   Procedure Laterality Date   • APPENDECTOMY     • CARDIAC CATHETERIZATION  03/18/2021    left main with no significant disease, proximal LAD with 10% stenosis at the site of prior stent, left circumflex artery with minimal luminal irregularities mid RCA with 50% stenosis at site of prior stent and PL segment with distal disease supplied by collaterals from the distal circumflex with no significant CAD requiring revascularization at that time     • CATARACT EXTRACTION, BILATERAL Bilateral    • COLONOSCOPY     • CORONARY ANGIOPLASTY WITH STENT PLACEMENT  1999    RCA   • CORONARY ANGIOPLASTY WITH STENT PLACEMENT 2001    RCA   • CORONARY ANGIOPLASTY WITH STENT PLACEMENT  2003    LAD   • EYE SURGERY     • IN BIOPSY OF PROSTATE,NEEDLE,TRANSPERINEAL N/A 09/13/2022    Procedure: TRANSPERINEAL MRI FUSION  BIOPSY PROSTATE;  Surgeon: Millicent Sanders MD;  Location: [facility];  Service: Urology   • IN REVISE MEDIAN N/CARPAL TUNNEL SURG Left 07/22/2022    Procedure: RELEASE CARPAL TUNNEL;  Surgeon: Priscilla Wright MD;  Location: [facility];  Service: Orthopedics   • IN REVISE ULNAR NERVE AT ELBOW Left 07/22/2022    Procedure: Sienna Malone;  Surgeon: Priscilla Wright MD;  Location: [facility];  Service: Orthopedics   • IN REVISE ULNAR NERVE AT WRIST Left 07/22/2022    Procedure: Julieth Calderon;  Surgeon: Priscilla Wright MD;  Location: [facility];  Service: Orthopedics   • SKIN CANCER EXCISION  2012    chin-per pt, basal cell  also right ankle       Family History   Problem Relation Age of Onset   • Heart attack Mother    • Dementia Mother    • No Known Problems Father          Medications have been verified  Objective   /64   Pulse 85   Temp (!) 97 1 °F (36 2 °C)   Resp 14   Ht 5' 8" (1 727 m)   Wt 100 kg (221 lb)   SpO2 94%   BMI 33 60 kg/m²        Physical Exam     Physical Exam  Vitals and nursing note reviewed  Constitutional:       General: He is not in acute distress  Appearance: Normal appearance  He is well-developed  He is not ill-appearing  Eyes:      General: Lids are normal    Cardiovascular:      Rate and Rhythm: Normal rate and regular rhythm  Heart sounds: Normal heart sounds, S1 normal and S2 normal  No murmur heard  No friction rub  No gallop  Pulmonary:      Effort: Pulmonary effort is normal  No respiratory distress  Breath sounds: Decreased air movement present  Examination of the right-upper field reveals decreased breath sounds  Examination of the left-upper field reveals decreased breath sounds   Examination of the right-middle field reveals decreased breath sounds  Examination of the left-middle field reveals decreased breath sounds  Examination of the right-lower field reveals decreased breath sounds and wheezing  Examination of the left-lower field reveals decreased breath sounds and wheezing  Decreased breath sounds and wheezing present  Musculoskeletal:         General: No tenderness or deformity  Normal range of motion  Skin:     General: Skin is warm  Findings: No erythema or rash  Neurological:      Mental Status: He is alert and oriented to person, place, and time  Psychiatric:         Behavior: Behavior normal  Behavior is cooperative  Thought Content:  Thought content normal

## 2022-10-31 ENCOUNTER — OFFICE VISIT (OUTPATIENT)
Dept: PULMONOLOGY | Facility: CLINIC | Age: 77
End: 2022-10-31

## 2022-10-31 ENCOUNTER — TELEPHONE (OUTPATIENT)
Dept: GENETICS | Facility: CLINIC | Age: 77
End: 2022-10-31

## 2022-10-31 VITALS
HEIGHT: 68 IN | DIASTOLIC BLOOD PRESSURE: 49 MMHG | TEMPERATURE: 96.7 F | HEART RATE: 90 BPM | WEIGHT: 207.4 LBS | OXYGEN SATURATION: 94 % | BODY MASS INDEX: 31.43 KG/M2 | SYSTOLIC BLOOD PRESSURE: 86 MMHG

## 2022-10-31 DIAGNOSIS — G47.30 SLEEP APNEA, UNSPECIFIED TYPE: Chronic | ICD-10-CM

## 2022-10-31 DIAGNOSIS — J44.1 CHRONIC OBSTRUCTIVE PULMONARY DISEASE WITH ACUTE EXACERBATION (HCC): ICD-10-CM

## 2022-10-31 DIAGNOSIS — J42 CHRONIC BRONCHITIS, UNSPECIFIED CHRONIC BRONCHITIS TYPE (HCC): Primary | ICD-10-CM

## 2022-10-31 RX ORDER — AZITHROMYCIN 500 MG/1
500 TABLET, FILM COATED ORAL 3 TIMES WEEKLY
Qty: 12 TABLET | Refills: 2 | Status: SHIPPED | OUTPATIENT
Start: 2022-10-31 | End: 2022-11-03 | Stop reason: SDUPTHER

## 2022-10-31 NOTE — ASSESSMENT & PLAN NOTE
Unfortunately shows having a hard time recovering from his recent RSV and COPD exacerbation  In addition to [place] and Pulmicort twice a day I have asked him to take albuterol in his nebulizer 3 times a day and restart Spiriva  He will continue a slow steroid taper and I will see him back in several days to assess his response  I have also restarted his Zithromax 500 3 times a week for refractory suppurative COPD

## 2022-10-31 NOTE — PROGRESS NOTES
Assessment/Plan:    Sleep apnea  Continue compliance with CPAP    Chronic obstructive pulmonary disease with acute exacerbation ([facility] )  Unfortunately shows having a hard time recovering from his recent RSV and COPD exacerbation  In addition to [place] and Pulmicort twice a day I have asked him to take albuterol in his nebulizer 3 times a day and restart Spiriva  He will continue a slow steroid taper and I will see him back in several days to assess his response  I have also restarted his Zithromax 500 3 times a week for refractory suppurative COPD  Diagnoses and all orders for this visit:    Chronic bronchitis, unspecified chronic bronchitis type ([facility] )  -     azithromycin (ZITHROMAX) 500 MG tablet; Take 1 tablet (500 mg total) by mouth 3 (three) times a week  -     Durable Medical Equipment    Sleep apnea, unspecified type    Chronic obstructive pulmonary disease with acute exacerbation (HCC)          Subjective:      Patient ID: Sabiha Blevins is a 65 y o  male  Fenelton Nap was recently hospitalized for COPD exacerbation secondary to RSV  He was hospitalized twice and is still recovering  He does not feel much better still wheezing and short of breath  The following portions of the patient's history were reviewed and updated as appropriate: allergies, current medications, past family history, past medical history, past social history, past surgical history and problem list     Review of Systems   Constitutional: Negative  HENT: Negative  Eyes: Negative  Respiratory: Positive for cough, shortness of breath and wheezing  Cardiovascular: Negative  Gastrointestinal: Negative  Endocrine: Negative  Genitourinary: Negative  Allergic/Immunologic: Negative  Neurological: Negative  Hematological: Negative  Psychiatric/Behavioral: Negative            Objective:      BP (!) 86/49   Pulse 90   Temp (!) 96 7 °F (35 9 °C)   Ht 5' 8" (1 727 m)   Wt 94 1 kg (207 lb 6 4 oz)   SpO2 94% BMI 31 54 kg/m²          Physical Exam  Constitutional:       Appearance: He is well-developed  HENT:      Head: Normocephalic  Eyes:      Pupils: Pupils are equal, round, and reactive to light  Cardiovascular:      Rate and Rhythm: Normal rate  Pulmonary:      Effort: Pulmonary effort is normal  No respiratory distress  Breath sounds: Wheezing present  No rales  Abdominal:      Palpations: Abdomen is soft  Musculoskeletal:         General: Normal range of motion  Cervical back: Neck supple  Skin:     General: Skin is warm and dry  Neurological:      Mental Status: He is alert and oriented to person, place, and time

## 2022-10-31 NOTE — TELEPHONE ENCOUNTER
I called the patient regarding the sample that was submitted for testing  The lab requested additional sample for genetic testing  I have explained this to the patient and has agreed to has labs collected

## 2022-10-31 NOTE — PATIENT INSTRUCTIONS
Take Budesonide and Brovana in nebulizer twice a day  Restart Spiriva 2 puffs once a day  Albuterol nebulizer three times a day  Continue Prednisone as slow taper as prescribed  Start Zithromax three times a week (Monday Wednesday Friday)

## 2022-11-01 ENCOUNTER — TELEPHONE (OUTPATIENT)
Dept: FAMILY MEDICINE CLINIC | Facility: CLINIC | Age: 77
End: 2022-11-01

## 2022-11-01 NOTE — TELEPHONE ENCOUNTER
Patient called asking about the status of his wheel chair order, he was not sure where to go from here (if there was a place that he would go to and select one )and has not heard word back

## 2022-11-02 ENCOUNTER — PATIENT OUTREACH (OUTPATIENT)
Dept: HEMATOLOGY ONCOLOGY | Facility: CLINIC | Age: 77
End: 2022-11-02

## 2022-11-02 ENCOUNTER — DOCUMENTATION (OUTPATIENT)
Dept: HEMATOLOGY ONCOLOGY | Facility: CLINIC | Age: 77
End: 2022-11-02

## 2022-11-02 NOTE — PROGRESS NOTES
Patient is scheduled for SpaceOAR/Markers on 12/1/22  Message sent to Rad Onc nurse to schedule Radiation SIM

## 2022-11-02 NOTE — TELEPHONE ENCOUNTER
Per chart review does not look like this was sent via parachute to [facility]  Entered request and should hear back soon

## 2022-11-02 NOTE — TELEPHONE ENCOUNTER
Cathy Helton had discussed this at his TCM visit, she ordered a motorized scooter- would start insurance approval process for motorized power chair  Thanks!

## 2022-11-02 NOTE — PROGRESS NOTES
Received message from urology office that patient had some questions for me  I called patient and spoke with him  He had questions about his testing needed for his SpaceOAR/Marker procedure  We went over what pre admission testing he needs before the SpaceOAR/Marker procedure on 12/1/22  He is aware to get labs and urine done about 2 weeks prior to procedure  He voiced understanding  Patient also told me that he needs to redo his genetic testing because something went wrong  He will take the kit to the lab and get his labs redrawn for the genetic testing  He will call me if he has any other questions

## 2022-11-03 ENCOUNTER — OFFICE VISIT (OUTPATIENT)
Dept: PULMONOLOGY | Facility: CLINIC | Age: 77
End: 2022-11-03

## 2022-11-03 VITALS
TEMPERATURE: 98.4 F | HEART RATE: 79 BPM | HEIGHT: 68 IN | SYSTOLIC BLOOD PRESSURE: 97 MMHG | BODY MASS INDEX: 32.1 KG/M2 | OXYGEN SATURATION: 93 % | WEIGHT: 211.8 LBS | DIASTOLIC BLOOD PRESSURE: 50 MMHG

## 2022-11-03 DIAGNOSIS — G47.33 OBSTRUCTIVE SLEEP APNEA SYNDROME IN ADULT: ICD-10-CM

## 2022-11-03 DIAGNOSIS — J44.1 CHRONIC OBSTRUCTIVE PULMONARY DISEASE WITH ACUTE EXACERBATION (HCC): Primary | ICD-10-CM

## 2022-11-03 DIAGNOSIS — J42 CHRONIC BRONCHITIS, UNSPECIFIED CHRONIC BRONCHITIS TYPE (HCC): ICD-10-CM

## 2022-11-03 RX ORDER — AZITHROMYCIN 500 MG/1
500 TABLET, FILM COATED ORAL 3 TIMES WEEKLY
Qty: 12 TABLET | Refills: 2 | Status: SHIPPED | OUTPATIENT
Start: 2022-11-04 | End: 2023-02-02

## 2022-11-03 NOTE — ASSESSMENT & PLAN NOTE
Danna Elizalde continues to slowly improve with regards to his RSV and COPD exacerbation  Feels his breathing is close to baseline  He is still on 30 mg of prednisone continues to wean down  He is taking his Zithromax 500 t i d  as well as his all nebulized solution  I would given the flu shot on today's visit  A follow-up in 3 months

## 2022-11-10 ENCOUNTER — DOCUMENTATION (OUTPATIENT)
Dept: HEMATOLOGY ONCOLOGY | Facility: CLINIC | Age: 77
End: 2022-11-10

## 2022-11-10 NOTE — PROGRESS NOTES
Patient is scheduled for Radiation SIM on 12/8/22  I will make sure patient follows back up with urology 3-6 months after completion of radiation with PSA prior to the visit

## 2022-11-11 ENCOUNTER — DOCUMENTATION (OUTPATIENT)
Dept: HEMATOLOGY ONCOLOGY | Facility: CLINIC | Age: 77
End: 2022-11-11

## 2022-11-11 NOTE — PROGRESS NOTES
Patients insurance will not cover his SpaceOAR/Marker procedure at [address] [place] [address]  At this time there is not any other opening at any other location until January  Message sent to Dr Flores Harvey and Dr Osman Nye to advise

## 2022-11-15 ENCOUNTER — APPOINTMENT (OUTPATIENT)
Dept: LAB | Facility: HOSPITAL | Age: 77
End: 2022-11-15

## 2022-11-15 DIAGNOSIS — G56.02 CARPAL TUNNEL SYNDROME ON LEFT: ICD-10-CM

## 2022-11-15 DIAGNOSIS — Z01.812 ENCOUNTER FOR PRE-OPERATIVE LABORATORY TESTING: ICD-10-CM

## 2022-11-15 DIAGNOSIS — C61 MALIGNANT NEOPLASM OF PROSTATE (HCC): ICD-10-CM

## 2022-11-15 DIAGNOSIS — Z80.3 FAMILY HISTORY OF MALIGNANT NEOPLASM OF BREAST: ICD-10-CM

## 2022-11-15 DIAGNOSIS — G56.22 CUBITAL TUNNEL SYNDROME ON LEFT: ICD-10-CM

## 2022-11-15 DIAGNOSIS — Z80.42 FAMILY HISTORY OF MALIGNANT NEOPLASM OF PROSTATE: ICD-10-CM

## 2022-11-15 DIAGNOSIS — G56.22 GUYON SYNDROME, LEFT: ICD-10-CM

## 2022-11-15 DIAGNOSIS — J42 CHRONIC BRONCHITIS, UNSPECIFIED CHRONIC BRONCHITIS TYPE (HCC): ICD-10-CM

## 2022-11-15 LAB
ALBUMIN SERPL BCP-MCNC: 3.8 G/DL (ref 3.5–5)
ALP SERPL-CCNC: 67 U/L (ref 34–104)
ALT SERPL W P-5'-P-CCNC: 29 U/L (ref 7–52)
ANION GAP SERPL CALCULATED.3IONS-SCNC: 5 MMOL/L (ref 4–13)
AST SERPL W P-5'-P-CCNC: 21 U/L (ref 13–39)
BASOPHILS # BLD MANUAL: 0 THOUSAND/UL (ref 0–0.1)
BASOPHILS NFR MAR MANUAL: 0 % (ref 0–1)
BILIRUB SERPL-MCNC: 0.86 MG/DL (ref 0.2–1)
BUN SERPL-MCNC: 27 MG/DL (ref 5–25)
CALCIUM SERPL-MCNC: 8.9 MG/DL (ref 8.4–10.2)
CHLORIDE SERPL-SCNC: 98 MMOL/L (ref 96–108)
CO2 SERPL-SCNC: 37 MMOL/L (ref 21–32)
CREAT SERPL-MCNC: 1.29 MG/DL (ref 0.6–1.3)
EOSINOPHIL # BLD MANUAL: 0.08 THOUSAND/UL (ref 0–0.4)
EOSINOPHIL NFR BLD MANUAL: 1 % (ref 0–6)
ERYTHROCYTE [DISTWIDTH] IN BLOOD BY AUTOMATED COUNT: 14.7 % (ref 11.6–15.1)
GFR SERPL CREATININE-BSD FRML MDRD: 52 ML/MIN/1.73SQ M
GIANT PLATELETS BLD QL SMEAR: PRESENT
GLUCOSE P FAST SERPL-MCNC: 98 MG/DL (ref 65–99)
HCT VFR BLD AUTO: 54.1 % (ref 36.5–49.3)
HGB BLD-MCNC: 17 G/DL (ref 12–17)
LG PLATELETS BLD QL SMEAR: PRESENT
LYMPHOCYTES # BLD AUTO: 0.78 THOUSAND/UL (ref 0.6–4.47)
LYMPHOCYTES # BLD AUTO: 10 % (ref 14–44)
MCH RBC QN AUTO: 31.5 PG (ref 26.8–34.3)
MCHC RBC AUTO-ENTMCNC: 31.4 G/DL (ref 31.4–37.4)
MCV RBC AUTO: 100 FL (ref 82–98)
MONOCYTES # BLD AUTO: 0.55 THOUSAND/UL (ref 0–1.22)
MONOCYTES NFR BLD: 7 % (ref 4–12)
NEUTROPHILS # BLD MANUAL: 6.27 THOUSAND/UL (ref 1.85–7.62)
NEUTS BAND NFR BLD MANUAL: 1 % (ref 0–8)
NEUTS SEG NFR BLD AUTO: 79 % (ref 43–75)
NT-PROBNP SERPL-MCNC: 239 PG/ML
PLATELET # BLD AUTO: 152 THOUSANDS/UL (ref 149–390)
PLATELET BLD QL SMEAR: ADEQUATE
PMV BLD AUTO: 10.3 FL (ref 8.9–12.7)
POTASSIUM SERPL-SCNC: 3.5 MMOL/L (ref 3.5–5.3)
PROT SERPL-MCNC: 6.2 G/DL (ref 6.4–8.4)
RBC # BLD AUTO: 5.39 MILLION/UL (ref 3.88–5.62)
RBC MORPH BLD: NORMAL
SODIUM SERPL-SCNC: 140 MMOL/L (ref 135–147)
VARIANT LYMPHS # BLD AUTO: 2 %
WBC # BLD AUTO: 7.84 THOUSAND/UL (ref 4.31–10.16)

## 2022-11-16 ENCOUNTER — APPOINTMENT (OUTPATIENT)
Dept: LAB | Facility: HOSPITAL | Age: 77
End: 2022-11-16

## 2022-11-16 DIAGNOSIS — C61 MALIGNANT NEOPLASM OF PROSTATE (HCC): ICD-10-CM

## 2022-11-17 LAB — BACTERIA UR CULT: NORMAL

## 2022-11-21 DIAGNOSIS — E78.5 HYPERLIPIDEMIA, UNSPECIFIED HYPERLIPIDEMIA TYPE: ICD-10-CM

## 2022-11-21 RX ORDER — ROSUVASTATIN CALCIUM 40 MG/1
TABLET, COATED ORAL
Qty: 100 TABLET | Refills: 3 | Status: SHIPPED | OUTPATIENT
Start: 2022-11-21

## 2022-11-22 ENCOUNTER — TELEPHONE (OUTPATIENT)
Dept: PAIN MEDICINE | Facility: CLINIC | Age: 77
End: 2022-11-22

## 2022-11-22 DIAGNOSIS — G89.4 CHRONIC PAIN SYNDROME: ICD-10-CM

## 2022-11-22 DIAGNOSIS — M54.6 CHRONIC RIGHT-SIDED THORACIC BACK PAIN: ICD-10-CM

## 2022-11-22 DIAGNOSIS — G89.29 CHRONIC RIGHT-SIDED THORACIC BACK PAIN: ICD-10-CM

## 2022-11-22 DIAGNOSIS — G58.8 INTERCOSTAL NEURALGIA: ICD-10-CM

## 2022-11-22 RX ORDER — GABAPENTIN 300 MG/1
300 CAPSULE ORAL 3 TIMES DAILY
Qty: 270 CAPSULE | Refills: 0 | Status: SHIPPED | OUTPATIENT
Start: 2022-11-22 | End: 2022-11-28 | Stop reason: SDUPTHER

## 2022-11-22 NOTE — TELEPHONE ENCOUNTER
----- Message from French Garcia RN sent at 11/22/2022 11:56 AM EST -----  Regarding: FW: gabapentin 300 mg  Contact: [phone]  Please advise refill to [facility]  ----- Message -----  From: Leni Fields "Imer Alatorre"  Sent: 11/22/2022  11:54 AM EST  To: [facility]  Subject: gabapentin 300 mg                                Lanie, Where was the perscription sent , [facility] or [facility] ?

## 2022-11-22 NOTE — TELEPHONE ENCOUNTER
----- Message from Zaira Olivarez RN sent at 11/22/2022 11:56 AM EST -----  Regarding: FW: gabapentin 300 mg  Contact: [phone]  Please advise refill to [facility]  ----- Message -----  From: Carmela Interiano "Thomas Ferreira"  Sent: 11/22/2022  11:54 AM EST  To: [facility]  Subject: gabapentin 300 mg                                Lanie, Where was the perscription sent , [facility] or [facility] ?

## 2022-11-25 DIAGNOSIS — B33.8 RSV (RESPIRATORY SYNCYTIAL VIRUS INFECTION): ICD-10-CM

## 2022-11-25 DIAGNOSIS — J44.1 CHRONIC OBSTRUCTIVE PULMONARY DISEASE WITH ACUTE EXACERBATION (HCC): ICD-10-CM

## 2022-11-25 DIAGNOSIS — J44.1 COPD EXACERBATION (HCC): ICD-10-CM

## 2022-11-25 RX ORDER — BUDESONIDE 0.5 MG/2ML
0.5 INHALANT ORAL
Qty: 120 ML | Refills: 0 | Status: SHIPPED | OUTPATIENT
Start: 2022-11-25

## 2022-11-25 RX ORDER — ALBUTEROL SULFATE 2.5 MG/3ML
2.5 SOLUTION RESPIRATORY (INHALATION) EVERY 6 HOURS PRN
Qty: 60 ML | Refills: 1 | Status: SHIPPED | OUTPATIENT
Start: 2022-11-25

## 2022-11-25 RX ORDER — ARFORMOTEROL TARTRATE 15 UG/2ML
15 SOLUTION RESPIRATORY (INHALATION) 2 TIMES DAILY
Qty: 120 ML | Refills: 2 | Status: SHIPPED | OUTPATIENT
Start: 2022-11-25

## 2022-11-28 ENCOUNTER — TELEPHONE (OUTPATIENT)
Dept: PAIN MEDICINE | Facility: CLINIC | Age: 77
End: 2022-11-28

## 2022-11-28 DIAGNOSIS — G89.29 CHRONIC RIGHT-SIDED THORACIC BACK PAIN: ICD-10-CM

## 2022-11-28 DIAGNOSIS — G58.8 INTERCOSTAL NEURALGIA: ICD-10-CM

## 2022-11-28 DIAGNOSIS — M54.6 CHRONIC RIGHT-SIDED THORACIC BACK PAIN: ICD-10-CM

## 2022-11-28 DIAGNOSIS — G89.4 CHRONIC PAIN SYNDROME: ICD-10-CM

## 2022-11-28 RX ORDER — GABAPENTIN 300 MG/1
300 CAPSULE ORAL 3 TIMES DAILY
Qty: 20 CAPSULE | Refills: 0 | Status: SHIPPED | OUTPATIENT
Start: 2022-11-28 | End: 2022-12-02 | Stop reason: SDUPTHER

## 2022-11-28 NOTE — TELEPHONE ENCOUNTER
----- Message from Yesica Herbert sent at 11/28/2022  1:56 PM EST -----  Patient stopped by the office today to see if he could get a 4-5 day supply of his gabapentin sent to [facility] in [facility] pass on [address]  Stated that he will not be receiving his for a few days via mail      Thank you,  Maria D

## 2022-11-30 ENCOUNTER — OFFICE VISIT (OUTPATIENT)
Dept: PAIN MEDICINE | Facility: CLINIC | Age: 77
End: 2022-11-30

## 2022-11-30 ENCOUNTER — OFFICE VISIT (OUTPATIENT)
Dept: URGENT CARE | Facility: CLINIC | Age: 77
End: 2022-11-30

## 2022-11-30 ENCOUNTER — TELEPHONE (OUTPATIENT)
Dept: CARDIOLOGY CLINIC | Facility: CLINIC | Age: 77
End: 2022-11-30

## 2022-11-30 VITALS
DIASTOLIC BLOOD PRESSURE: 53 MMHG | WEIGHT: 211 LBS | RESPIRATION RATE: 18 BRPM | BODY MASS INDEX: 32.08 KG/M2 | HEART RATE: 94 BPM | SYSTOLIC BLOOD PRESSURE: 86 MMHG | TEMPERATURE: 98.9 F | OXYGEN SATURATION: 81 %

## 2022-11-30 VITALS
HEIGHT: 68 IN | BODY MASS INDEX: 31.98 KG/M2 | HEART RATE: 96 BPM | WEIGHT: 211 LBS | SYSTOLIC BLOOD PRESSURE: 89 MMHG | DIASTOLIC BLOOD PRESSURE: 58 MMHG

## 2022-11-30 DIAGNOSIS — M54.16 LUMBAR RADICULOPATHY: ICD-10-CM

## 2022-11-30 DIAGNOSIS — M54.41 CHRONIC BILATERAL LOW BACK PAIN WITH RIGHT-SIDED SCIATICA: ICD-10-CM

## 2022-11-30 DIAGNOSIS — G58.8 INTERCOSTAL NEURALGIA: ICD-10-CM

## 2022-11-30 DIAGNOSIS — Z91.199 NO-SHOW FOR APPOINTMENT: Primary | ICD-10-CM

## 2022-11-30 DIAGNOSIS — G89.4 CHRONIC PAIN SYNDROME: Primary | ICD-10-CM

## 2022-11-30 DIAGNOSIS — G89.29 CHRONIC BILATERAL LOW BACK PAIN WITH RIGHT-SIDED SCIATICA: ICD-10-CM

## 2022-11-30 NOTE — PROGRESS NOTES
[employer]        NAME: Jayda Pimentel is a 65 y o  male  : 1945    MRN: [id number]  DATE: 2022  TIME: 12:53 PM    Assessment and Plan   No primary diagnosis found  No diagnosis found  Patient has a hx/o COPD states his O2 typically ranges within the 80s and 90s  Patient is on 2-3L of O2  Denies new/worsening SOB  He has been following up with Pulmonology, last apt on 11/3/22  Patient additionally states his BP is always within 80s/40-80s range  Denies lightheadedness or syncope  Patient Instructions       Follow up with PCP in 3-5 days  Proceed to  ER if symptoms worsen  Chief Complaint     Chief Complaint   Patient presents with   • Toe Pain     Left great several months   • Extremity Weakness     Right several months          History of Present Illness       Denies cool/hot extremities, numbness, tingling, redness,     Toe Pain     Extremity Weakness         Review of Systems   Review of Systems   Musculoskeletal: Positive for extremity weakness           Current Medications       Current Outpatient Medications:   •  albuterol (2 5 mg/3 mL) 0 083 % nebulizer solution, Take 3 mL (2 5 mg total) by nebulization every 6 (six) hours as needed for wheezing or shortness of breath, Disp: 60 mL, Rfl: 1  •  arformoterol (BROVANA) 15 mcg/2 mL nebulizer solution, Take 2 mL (15 mcg total) by nebulization 2 (two) times a day, Disp: 120 mL, Rfl: 2  •  aspirin 81 mg chewable tablet, CHEW ONE TABLET BY MOUTH EVERY DAY, Disp: , Rfl:   •  Blood Pressure Monitor KIT, Use daily (Patient taking differently: Use 40 mg daily), Disp: 1 kit, Rfl: 0  •  budesonide (PULMICORT) 0 5 mg/2 mL nebulizer solution, Take 2 mL (0 5 mg total) by nebulization every 12 (twelve) hours Rinse mouth after use , Disp: 120 mL, Rfl: 0  •  famotidine (PEPCID) 20 mg tablet, Take 20 mg by mouth daily at bedtime, Disp: , Rfl:   •  fluticasone (FLONASE) 50 mcg/act nasal spray, into each nostril as needed , Disp: , Rfl: •  Fluticasone-Salmeterol (Advair) 250-50 mcg/dose inhaler, Inhale 1 puff 2 (two) times a day Rinse mouth after use , Disp: , Rfl:   •  furosemide (LASIX) 40 mg tablet, Take 1 tablet (40 mg total) by mouth daily (Patient taking differently: Take 40 mg by mouth every morning), Disp: 100 tablet, Rfl: 3  •  gabapentin (NEURONTIN) 300 mg capsule, Take 1 capsule (300 mg total) by mouth 3 (three) times a day, Disp: 20 capsule, Rfl: 0  •  metoprolol succinate (TOPROL-XL) 25 mg 24 hr tablet, Take 1 tablet (25 mg total) by mouth daily (Patient taking differently: Take 75 mg by mouth daily in the early morning), Disp: 90 tablet, Rfl: 3  •  nitroglycerin (NITROSTAT) 0 4 mg SL tablet, DISSOLVE ONE TABLET UNDER THE TONGUE  PRN, Disp: , Rfl:   •  potassium chloride (Klor-Con) 10 mEq tablet, Take 2 tablets (20 mEq total) by mouth daily, Disp: 200 tablet, Rfl: 3  •  rosuvastatin (CRESTOR) 40 MG tablet, TAKE 1 TABLET DAILY (Patient taking differently: Take 40 mg by mouth daily at bedtime), Disp: 100 tablet, Rfl: 3  •  Tiotropium Bromide Monohydrate (SPIRIVA RESPIMAT IN), Inhale every morning, Disp: , Rfl:   •  Toprol XL 50 MG 24 hr tablet, TAKE 1 TABLET TWICE A DAY , Disp: 180 tablet, Rfl: 3  •  azithromycin (ZITHROMAX) 500 MG tablet, Take 1 tablet (500 mg total) by mouth 3 (three) times a week (Patient taking differently: Take 500 mg by mouth 3 (three) times a week), Disp: 12 tablet, Rfl: 2    Current Allergies     Allergies as of 11/30/2022 - Reviewed 11/30/2022   Allergen Reaction Noted   • Lisinopril Swelling and Cough 01/18/2008   • Tetanus antitoxin Anaphylaxis 06/09/2005   • Tetanus toxoid Anaphylaxis and Swelling 01/16/2007            The following portions of the patient's history were reviewed and updated as appropriate: allergies, current medications, past family history, past medical history, past social history, past surgical history and problem list      Past Medical History:   Diagnosis Date   • Bilateral carotid artery stenosis    • Cancer (HCC)     skin   • Chronic diastolic heart failure (HCC)    • Chronic ischemic heart disease    • Chronic kidney disease     stage 3   • Colon polyp    • COPD (chronic obstructive pulmonary disease) ([facility])    • Coronary artery disease     hx stents, MI, PCI   • COVID 11/2021   • CPAP (continuous positive airway pressure) dependence    • Hearing loss    • History of transfusion 1995   • Hyperlipidemia    • Hypertension    • MI (myocardial infarction) ([facility] ) 1995   • Myocardial infarction ([facility] ) 1995   • Pneumonia    • Prostate cancer ([facility] )    • RSV (respiratory syncytial virus infection) 10/2022   • S/P carotid endarterectomy    • Shortness of breath     O2 2 l/nc PRN   • Sleep apnea    • Sleep apnea, obstructive    • Stented coronary artery        Past Surgical History:   Procedure Laterality Date   • APPENDECTOMY     • CARDIAC CATHETERIZATION  03/18/2021    left main with no significant disease, proximal LAD with 10% stenosis at the site of prior stent, left circumflex artery with minimal luminal irregularities mid RCA with 50% stenosis at site of prior stent and PL segment with distal disease supplied by collaterals from the distal circumflex with no significant CAD requiring revascularization at that time     • CATARACT EXTRACTION, BILATERAL Bilateral    • COLONOSCOPY     • CORONARY ANGIOPLASTY WITH STENT PLACEMENT  1999    RCA   • CORONARY ANGIOPLASTY WITH STENT PLACEMENT  2001    RCA   • CORONARY ANGIOPLASTY WITH STENT PLACEMENT  2003    LAD   • EYE SURGERY     • NV BIOPSY OF PROSTATE,NEEDLE,TRANSPERINEAL N/A 09/13/2022    Procedure: TRANSPERINEAL MRI FUSION  BIOPSY PROSTATE;  Surgeon: Jacob Maher MD;  [facility];  Service: Urology   • NV REVISE MEDIAN N/CARPAL TUNNEL SURG Left 07/22/2022    Procedure: RELEASE CARPAL TUNNEL;  Surgeon: Yoon Sigala MD;  Location: [facility];  Service: Orthopedics   • NV REVISE ULNAR NERVE AT ELBOW Left 07/22/2022    Procedure: RELEASE CUBITAL TUNNEL;  Surgeon: Gia Bird MD;  Location: [facility];  Service: Orthopedics   • [place] REVISE ULNAR NERVE AT WRIST Left 07/22/2022    Procedure: Malathi Ryan;  Surgeon: Gia Bird MD;  [facility];  Service: Orthopedics   • SKIN CANCER EXCISION  2012    chin-per pt, basal cell  also right ankle       Family History   Problem Relation Age of Onset   • Heart attack Mother    • Dementia Mother    • No Known Problems Father          Medications have been verified  Objective   BP (!) 86/53   Pulse 94   Temp 98 9 °F (37 2 °C)   Resp 18   Wt 95 7 kg (211 lb)   SpO2 (!) 81%   BMI 32 08 kg/m²   No LMP for male patient         Physical Exam     Physical Exam

## 2022-11-30 NOTE — TELEPHONE ENCOUNTER
He was at [place] office today and his bp is pretty low  89/58 and then they took again and it was 91/59    He is lightheaded  and dizzy  He is taking metoprolol 75 mg in the am and 50 mg in the pm   Recs?

## 2022-11-30 NOTE — TELEPHONE ENCOUNTER
Received message that patient called stating blood pressure at pain management office was low today with some episodes of lightheadedness   -I was able to call and speak with the patient  He noted that at this time he was feeling okay but that he had had some softer blood pressures the past couple days in the low 100s and upper 87B systolic  He denied any loss of consciousness or issue at this time and noted that his pain management physician had decreased his gabapentin dose  In the setting of softer blood pressures will reduce metoprolol succinate from 75 mg in the morning and 50 mg in the evening to 50 mg b i d  At this time  Patient will hold off taking evening dose of metoprolol today and restart therapy tomorrow  Patient instructed to monitor blood pressures closely and if blood pressures remain soft to let our office know so that medications can be further reduced as needed  Patient counseled if he were to have any warning or alarm type symptoms he should seek emergency medical care immediately  Patient noted that overall he was feeling reasonably well at this time and will attempt medical changes as instructed

## 2022-11-30 NOTE — PROGRESS NOTES
Assessment:  1  Chronic pain syndrome    2  Chronic bilateral low back pain with right-sided sciatica    3  Lumbar radiculopathy    4  Intercostal neuralgia        Plan:  While the patient was in the office today, I did have a thorough conversation regarding their chronic pain syndrome, medication management, and treatment plan options  Patient is being seen for follow-up visit  Patient is concerned that his gabapentin maybe causing dizziness and lightheadedness  Patient has been experiencing intermittent dizziness and lightheadedness on and off for a couple months  He has been taking this current dose of gabapentin since July  At this point, we will plan to decrease gabapentin to 100 milligrams 3 times daily  He did not require new prescription  He states that he is leftover 100 milligram capsules from previous prescription  His blood pressure was decreased during today's visit  Patient tells me that blood pressure medication was recently increased  My plan was to reach out to his cardiologist to make him aware of the hypotension  Unfortunately we are having Network wide phone issues and can not place an outside phone call  We were unable to place an outside phone call from cell phone as well  I advised patient to call his cardiologist as soon as he gets home to make him aware of the low blood pressures  Patient is complaining of pain and weakness in the right lower extremity  Also, intermittent pain in bilateral great toes  Pain always seems worse when he is lying down  On exam, he is adequate pulses in his lower extremities  Feet are warm to touch  We discussed possibility of diagnostic/therapeutic epidural steroid injections  Patient states that at this point, his pain is not severe enough to consider interventional therapy  He would consider injections in the future if his pain worsens  The patient will follow-up in 1 month for medication prescription refill and reevaluation   The patient was advised to contact the office should their symptoms worsen in the interim  The patient was agreeable and verbalized an understanding  History of Present Illness: The patient is a 65 y o  male who presents for a follow up office visit in regards to Leg Pain  The patient’s current symptoms include complaints of right lower extremity pain, pain in bilateral great toes  Current pain level is a 2/10  Quality of pain is described as pressure-like  Current pain medications includes:  Gabapentin 600 milligrams in the morning, 300 milligrams at night   The patient reports that this regimen is providing 90 % pain relief  The patient is reporting no side effects from this pain medication regimen  I have personally reviewed and/or updated the patient's past medical history, past surgical history, family history, social history, current medications, allergies, and vital signs today  Review of Systems  Review of Systems   Constitutional: Negative for unexpected weight change  HENT: Negative for hearing loss  Eyes: Negative for visual disturbance  Respiratory: Positive for shortness of breath  Cardiovascular: Negative for leg swelling  Gastrointestinal: Negative for constipation  Endocrine: Negative for polyuria  Genitourinary: Negative for difficulty urinating  Musculoskeletal: Positive for gait problem  Negative for joint swelling and myalgias  Decreased range of motion   Skin: Negative for rash  Neurological: Positive for dizziness  Negative for weakness and headaches  Psychiatric/Behavioral: Negative for decreased concentration  All other systems reviewed and are negative          Past Medical History:   Diagnosis Date   • Bilateral carotid artery stenosis    • Cancer (HCC)     skin   • Chronic diastolic heart failure (HCC)    • Chronic ischemic heart disease    • Chronic kidney disease     stage 3   • Colon polyp    • COPD (chronic obstructive pulmonary disease) ([facility] )    • Coronary artery disease     hx stents, MI, PCI   • COVID 11/2021   • CPAP (continuous positive airway pressure) dependence    • Hearing loss    • History of transfusion 1995   • Hyperlipidemia    • Hypertension    • MI (myocardial infarction) ([facility] ) 1995   • Myocardial infarction ([facility] ) 1995   • Pneumonia    • Prostate cancer ([place] [address] )    • RSV (respiratory syncytial virus infection) 10/2022   • S/P carotid endarterectomy    • Shortness of breath     O2 2 l/nc PRN   • Sleep apnea    • Sleep apnea, obstructive    • Stented coronary artery        Past Surgical History:   Procedure Laterality Date   • APPENDECTOMY     • CARDIAC CATHETERIZATION  03/18/2021    left main with no significant disease, proximal LAD with 10% stenosis at the site of prior stent, left circumflex artery with minimal luminal irregularities mid RCA with 50% stenosis at site of prior stent and PL segment with distal disease supplied by collaterals from the distal circumflex with no significant CAD requiring revascularization at that time     • CATARACT EXTRACTION, BILATERAL Bilateral    • COLONOSCOPY     • CORONARY ANGIOPLASTY WITH STENT PLACEMENT  1999    RCA   • CORONARY ANGIOPLASTY WITH STENT PLACEMENT  2001    RCA   • CORONARY ANGIOPLASTY WITH STENT PLACEMENT  2003    LAD   • EYE SURGERY     • [place] BIOPSY OF PROSTATE,NEEDLE,TRANSPERINEAL N/A 09/13/2022    Procedure: TRANSPERINEAL MRI FUSION  BIOPSY PROSTATE;  Surgeon: Nataly Maloney MD;  [facility];  Service: Urology   • VA REVISE MEDIAN N/CARPAL TUNNEL SURG Left 07/22/2022    Procedure: RELEASE CARPAL TUNNEL;  Surgeon: Evelyne Redman MD;  Location[facility];  Service: Orthopedics   • VA REVISE ULNAR NERVE AT ELBOW Left 07/22/2022    Procedure: Henrique Crocker;  Surgeon: Evelyne Redman MD;  [facility];  Service: Orthopedics   • VA REVISE ULNAR NERVE AT WRIST Left 07/22/2022    Procedure: Awa Kiser;  Surgeon: Evelyne Redman MD; Location: [facility];  Service: Orthopedics   • SKIN CANCER EXCISION      chin-per pt, basal cell  also right ankle       Family History   Problem Relation Age of Onset   • Heart attack Mother    • Dementia Mother    • No Known Problems Father        Social History     Occupational History   • Not on file   Tobacco Use   • Smoking status: Former     Packs/day: 2 00     Years: 35 00     Pack years: 70 00     Types: Cigarettes     Quit date: 1995     Years since quittin 4   • Smokeless tobacco: Never   Vaping Use   • Vaping Use: Never used   Substance and Sexual Activity   • Alcohol use: Not Currently   • Drug use: Never   • Sexual activity: Not on file         Current Outpatient Medications:   •  albuterol (2 5 mg/3 mL) 0 083 % nebulizer solution, Take 3 mL (2 5 mg total) by nebulization every 6 (six) hours as needed for wheezing or shortness of breath, Disp: 60 mL, Rfl: 1  •  arformoterol (BROVANA) 15 mcg/2 mL nebulizer solution, Take 2 mL (15 mcg total) by nebulization 2 (two) times a day, Disp: 120 mL, Rfl: 2  •  aspirin 81 mg chewable tablet, CHEW ONE TABLET BY MOUTH EVERY DAY, Disp: , Rfl:   •  azithromycin (ZITHROMAX) 500 MG tablet, Take 1 tablet (500 mg total) by mouth 3 (three) times a week (Patient taking differently: Take 500 mg by mouth 3 (three) times a week), Disp: 12 tablet, Rfl: 2  •  Blood Pressure Monitor KIT, Use daily (Patient taking differently: Use 40 mg daily), Disp: 1 kit, Rfl: 0  •  budesonide (PULMICORT) 0 5 mg/2 mL nebulizer solution, Take 2 mL (0 5 mg total) by nebulization every 12 (twelve) hours Rinse mouth after use , Disp: 120 mL, Rfl: 0  •  famotidine (PEPCID) 20 mg tablet, Take 20 mg by mouth daily at bedtime, Disp: , Rfl:   •  fluticasone (FLONASE) 50 mcg/act nasal spray, into each nostril as needed , Disp: , Rfl:   •  Fluticasone-Salmeterol (Advair) 250-50 mcg/dose inhaler, Inhale 1 puff 2 (two) times a day Rinse mouth after use , Disp: , Rfl:   •  furosemide (LASIX) 40 mg tablet, Take 1 tablet (40 mg total) by mouth daily (Patient taking differently: Take 40 mg by mouth every morning), Disp: 100 tablet, Rfl: 3  •  gabapentin (NEURONTIN) 300 mg capsule, Take 1 capsule (300 mg total) by mouth 3 (three) times a day, Disp: 20 capsule, Rfl: 0  •  metoprolol succinate (TOPROL-XL) 25 mg 24 hr tablet, Take 1 tablet (25 mg total) by mouth daily (Patient taking differently: Take 75 mg by mouth daily in the early morning), Disp: 90 tablet, Rfl: 3  •  nitroglycerin (NITROSTAT) 0 4 mg SL tablet, DISSOLVE ONE TABLET UNDER THE TONGUE  PRN, Disp: , Rfl:   •  potassium chloride (Klor-Con) 10 mEq tablet, Take 2 tablets (20 mEq total) by mouth daily, Disp: 200 tablet, Rfl: 3  •  rosuvastatin (CRESTOR) 40 MG tablet, TAKE 1 TABLET DAILY (Patient taking differently: Take 40 mg by mouth daily at bedtime), Disp: 100 tablet, Rfl: 3  •  Tiotropium Bromide Monohydrate (SPIRIVA RESPIMAT IN), Inhale every morning, Disp: , Rfl:   •  Toprol XL 50 MG 24 hr tablet, TAKE 1 TABLET TWICE A DAY , Disp: 180 tablet, Rfl: 3    Allergies   Allergen Reactions   • Lisinopril Swelling and Cough   • Tetanus Antitoxin Anaphylaxis   • Tetanus Toxoid Anaphylaxis and Swelling       Physical Exam:    BP (!) 89/58   Pulse 96   Ht 5' 8" (1 727 m)   Wt 95 7 kg (211 lb)   BMI 32 08 kg/m²     Constitutional:normal, well developed, well nourished, alert, in no distress and non-toxic and no overt pain behavior  Eyes:anicteric  HEENT:grossly intact  Neck:supple, symmetric, trachea midline and no masses   Pulmonary:even and unlabored  Cardiovascular:No edema or pitting edema present  Skin:Normal without rashes or lesions and well hydrated  Psychiatric:Mood and affect appropriate  Neurologic:Cranial Nerves II-XII grossly intact  Musculoskeletal:normal    Imaging  No orders to display       No orders of the defined types were placed in this encounter

## 2022-11-30 NOTE — PROGRESS NOTES
Patient accidentally signed into Urgent Care instead of Pain Management for his appointment  O2 and BP taken retaken during visit  O2 91% (patient has a hx/o COPD and is on 2-3L portable O2)  BP 90/55 as is typical for patient as her chart review  Note deleted  Charges were refunded

## 2022-12-01 ENCOUNTER — OFFICE VISIT (OUTPATIENT)
Dept: FAMILY MEDICINE CLINIC | Facility: CLINIC | Age: 77
End: 2022-12-01

## 2022-12-01 VITALS
HEART RATE: 97 BPM | SYSTOLIC BLOOD PRESSURE: 132 MMHG | WEIGHT: 217.4 LBS | OXYGEN SATURATION: 99 % | DIASTOLIC BLOOD PRESSURE: 62 MMHG | BODY MASS INDEX: 32.95 KG/M2 | RESPIRATION RATE: 18 BRPM | HEIGHT: 68 IN | TEMPERATURE: 99.7 F

## 2022-12-01 DIAGNOSIS — R05.1 ACUTE COUGH: Primary | ICD-10-CM

## 2022-12-01 DIAGNOSIS — J96.21 ACUTE ON CHRONIC RESPIRATORY FAILURE WITH HYPOXIA (HCC): ICD-10-CM

## 2022-12-01 DIAGNOSIS — R06.02 SOB (SHORTNESS OF BREATH): ICD-10-CM

## 2022-12-01 DIAGNOSIS — J44.1 CHRONIC OBSTRUCTIVE PULMONARY DISEASE WITH ACUTE EXACERBATION (HCC): ICD-10-CM

## 2022-12-01 DIAGNOSIS — R52 BODY ACHES: ICD-10-CM

## 2022-12-01 RX ORDER — PREDNISONE 10 MG/1
TABLET ORAL
Qty: 32 TABLET | Refills: 0 | Status: SHIPPED | OUTPATIENT
Start: 2022-12-01 | End: 2022-12-16

## 2022-12-01 RX ORDER — DOXYCYCLINE HYCLATE 100 MG/1
100 CAPSULE ORAL EVERY 12 HOURS SCHEDULED
Qty: 14 CAPSULE | Refills: 0 | Status: SHIPPED | OUTPATIENT
Start: 2022-12-01 | End: 2022-12-08

## 2022-12-01 NOTE — PROGRESS NOTES
Assessment/Plan:       Problem List Items Addressed This Visit        Respiratory    Chronic obstructive pulmonary disease with acute exacerbation (HCC)    Relevant Medications    doxycycline hyclate (VIBRAMYCIN) 100 mg capsule    predniSONE 10 mg tablet    Acute on chronic respiratory failure with hypoxia (HCC)    Relevant Medications    doxycycline hyclate (VIBRAMYCIN) 100 mg capsule    predniSONE 10 mg tablet   Other Visit Diagnoses     Acute cough    -  Primary    Relevant Medications    doxycycline hyclate (VIBRAMYCIN) 100 mg capsule    predniSONE 10 mg tablet    Other Relevant Orders    Covid/Flu- Office Collect    Body aches        Relevant Medications    doxycycline hyclate (VIBRAMYCIN) 100 mg capsule    predniSONE 10 mg tablet    Other Relevant Orders    Covid/Flu- Office Collect    SOB (shortness of breath)        Relevant Medications    doxycycline hyclate (VIBRAMYCIN) 100 mg capsule    predniSONE 10 mg tablet    Other Relevant Orders    Covid/Flu- Office Collect            COVID/Flu testing collected  Start antibiotic and steroid  ED precautions discussed  Subjective:      Patient ID: Sita Pena is a 65 y o  male  HPI     Cough- Started day and half ago, cough greenish/yellow mucous, body aches, nasal congestion, shortness of breath worsening  More winded  Using oxygen 3L, baseline 2L  No chest pains, although mentions chronic left sided rib pain intermittent for months, resolves quickly, no triggers  The following portions of the patient's history were reviewed and updated as appropriate: allergies, current medications, past family history, past medical history, past social history, past surgical history, and problem list     Review of Systems   All other systems reviewed and are negative          Objective:      /62   Pulse 97   Temp 99 7 °F (37 6 °C)   Resp 18   Ht 5' 8" (1 727 m)   Wt 98 6 kg (217 lb 6 4 oz)   SpO2 99%   BMI 33 06 kg/m²          Physical Exam  Vitals reviewed  Constitutional:       General: He is not in acute distress  Appearance: Normal appearance  He is not ill-appearing, toxic-appearing or diaphoretic  HENT:      Head: Normocephalic and atraumatic  Right Ear: Tympanic membrane, ear canal and external ear normal  There is no impacted cerumen  Left Ear: Tympanic membrane, ear canal and external ear normal  There is no impacted cerumen  Nose: Congestion and rhinorrhea present  Mouth/Throat:      Mouth: Mucous membranes are moist       Pharynx: No oropharyngeal exudate or posterior oropharyngeal erythema  Eyes:      General:         Right eye: No discharge  Left eye: No discharge  Extraocular Movements: Extraocular movements intact  Conjunctiva/sclera: Conjunctivae normal    Cardiovascular:      Rate and Rhythm: Normal rate and regular rhythm  Heart sounds: Murmur heard  No friction rub  No gallop  Pulmonary:      Effort: Pulmonary effort is normal  No respiratory distress  Breath sounds: No stridor  Wheezing present  No rhonchi  Comments: 3L NC, diminished breath sounds bases, scattered wheezing and crackles    Musculoskeletal:         General: No swelling, tenderness or signs of injury  Skin:     General: Skin is warm  Coloration: Skin is not pale  Findings: No erythema or rash  Neurological:      Mental Status: He is alert and oriented to person, place, and time  Motor: No weakness     Psychiatric:         Mood and Affect: Mood normal          Behavior: Behavior normal              Amaury DO Kaleb Thakkar [address] [place] [address]

## 2022-12-02 ENCOUNTER — TELEPHONE (OUTPATIENT)
Dept: RADIATION ONCOLOGY | Facility: CLINIC | Age: 77
End: 2022-12-02

## 2022-12-02 ENCOUNTER — DOCUMENTATION (OUTPATIENT)
Dept: HEMATOLOGY ONCOLOGY | Facility: CLINIC | Age: 77
End: 2022-12-02

## 2022-12-02 ENCOUNTER — PATIENT MESSAGE (OUTPATIENT)
Dept: PAIN MEDICINE | Facility: CLINIC | Age: 77
End: 2022-12-02

## 2022-12-02 ENCOUNTER — TELEPHONE (OUTPATIENT)
Dept: PAIN MEDICINE | Facility: CLINIC | Age: 77
End: 2022-12-02

## 2022-12-02 ENCOUNTER — TELEPHONE (OUTPATIENT)
Dept: FAMILY MEDICINE CLINIC | Facility: CLINIC | Age: 77
End: 2022-12-02

## 2022-12-02 ENCOUNTER — TELEPHONE (OUTPATIENT)
Dept: INFUSION CENTER | Facility: CLINIC | Age: 77
End: 2022-12-02

## 2022-12-02 ENCOUNTER — PATIENT MESSAGE (OUTPATIENT)
Dept: CARDIOLOGY CLINIC | Facility: CLINIC | Age: 77
End: 2022-12-02

## 2022-12-02 DIAGNOSIS — G89.29 CHRONIC RIGHT-SIDED THORACIC BACK PAIN: ICD-10-CM

## 2022-12-02 DIAGNOSIS — M54.6 CHRONIC RIGHT-SIDED THORACIC BACK PAIN: ICD-10-CM

## 2022-12-02 DIAGNOSIS — G89.4 CHRONIC PAIN SYNDROME: ICD-10-CM

## 2022-12-02 DIAGNOSIS — G58.8 INTERCOSTAL NEURALGIA: ICD-10-CM

## 2022-12-02 LAB
FLUAV RNA RESP QL NAA+PROBE: NEGATIVE
FLUBV RNA RESP QL NAA+PROBE: NEGATIVE
SARS-COV-2 RNA RESP QL NAA+PROBE: POSITIVE

## 2022-12-02 RX ORDER — GABAPENTIN 300 MG/1
300 CAPSULE ORAL 3 TIMES DAILY
Qty: 270 CAPSULE | Refills: 0 | Status: SHIPPED | OUTPATIENT
Start: 2022-12-02

## 2022-12-02 NOTE — TELEPHONE ENCOUNTER
Patient called and saw that he was positive for covid, he does not have any issues such as cough, fever, and reports that he feels normal, he is feeling much better from yesterday, he is asking if there is anything that he should be doing at this point,    Advised to stay hydrated, Anson Bingham was asking id any issues with breathing were to come up what to do as he is on  Home O2, advised if any issues arise with breathing to seek ED, Patient also wanted to let Dr Yousif Reece he was doing better and is "on the mend"

## 2022-12-02 NOTE — TELEPHONE ENCOUNTER
[employer] [facility] cancelled by anesthesia yesterday  Needs to be rescheduled    Per patient "it must be at facility with an ICU" as patient has cardiac history and CHF history

## 2022-12-02 NOTE — TELEPHONE ENCOUNTER
----- Message from Valentino Carlin RN sent at 12/2/2022  9:08 AM EST -----  Regarding: FW: gabapentin 100 mg  Contact: [phone]    ----- Message -----  From: Saira Keys "Haley Medico"  Sent: 12/2/2022   8:47 AM EST  To: [facility]  Subject: gabapentin 100 mg                                I checked my 100 mg and only had enough for about a week with 3 per day so if you could please order them thru [facility] mail in for 90 days    Thanks   Darlyn Arthur [phone]

## 2022-12-02 NOTE — PATIENT COMMUNICATION
I spoke to cristian  He had no idea he had covid  So I scheduled him on Tuesday but he has an advanced dermatology appt  I told him I can put him on Thursday then so he is having issues with skin cancer too  He feels much better today than yesterday  He will need clearance for prostate procedure   He is good to come in on Thursday next week

## 2022-12-02 NOTE — PROGRESS NOTES
Received message from Viral Smith, RN stating "[employer] [facility] cancelled by anesthesia yesterday  Needs to be rescheduled  Per patient "it must be at facility with an ICU" as patient has cardiac history and CHF history"  Message sent to urology surgery scheduler to reschedule procedure  Message also sent to Dr Marleny Santana and Dr Ramírez Nguyen as Gloria Larson

## 2022-12-02 NOTE — TELEPHONE ENCOUNTER
States that anesthesia cancelled his surgery yesterday d/t URI symptoms and CHF    Will reach out to urology coordinator to reschedule

## 2022-12-06 ENCOUNTER — PATIENT MESSAGE (OUTPATIENT)
Dept: PAIN MEDICINE | Facility: CLINIC | Age: 77
End: 2022-12-06

## 2022-12-07 ENCOUNTER — PATIENT MESSAGE (OUTPATIENT)
Dept: PAIN MEDICINE | Facility: CLINIC | Age: 77
End: 2022-12-07

## 2022-12-07 ENCOUNTER — TELEPHONE (OUTPATIENT)
Dept: GENETICS | Facility: CLINIC | Age: 77
End: 2022-12-07

## 2022-12-07 ENCOUNTER — DOCUMENTATION (OUTPATIENT)
Dept: HEMATOLOGY ONCOLOGY | Facility: CLINIC | Age: 77
End: 2022-12-07

## 2022-12-07 ENCOUNTER — TELEPHONE (OUTPATIENT)
Dept: PAIN MEDICINE | Facility: CLINIC | Age: 77
End: 2022-12-07

## 2022-12-07 NOTE — TELEPHONE ENCOUNTER
----- Message from Ricardo Mckay RN sent at 12/7/2022  1:13 PM EST -----  Regarding: FW: pain  Contact: [phone]    ----- Message -----  From: Gayle Antunez "Cassandra Pottsracheal"  Sent: 12/7/2022  12:58 PM EST  To: [facility]  Subject: pain                                             I was taking 900 mg per day and got light headed and dizzy  We cut back to the 300 level and the pain along the ribs is back at 4 to 6 level so I was wondering if a 500 or 600 level might work

## 2022-12-07 NOTE — TELEPHONE ENCOUNTER
I sent a prescription to his mail to her pharmacy on 12/2/2022 for gabapentin 300 mg 3 times daily  Please advise him to only take it twice daily  This would be a total daily dose of 600 mg

## 2022-12-07 NOTE — TELEPHONE ENCOUNTER
So, the reason we decreased the gabapentin was because he was afraid it was contributing to dizziness and lightheadedness  Has the dizziness and light headedness improved since he decreased the dose of gabapentin?

## 2022-12-07 NOTE — TELEPHONE ENCOUNTER
Post-Test Genetic Counseling Consult Note  Today I spoke with Silver Raphael over the phone to review the results of his genetic test for hereditary cancer  Silver Raphael met previously with Trenton Isaacs on 10/13/22 for pre-test counseling  A copy of this consult note and genetic test result will be shared with the patient  SUMMARY:    Test(s): [facility] + RNA (36 genes): APC, FILEMON, AXIN2 BARD1, BRCA1, BRCA2, BRIP1, BMPR1A, CDH1, CDK4, CDKN2A, CHEK2, DICER1, EPCAM, GREM1, HOXB13, MLH1, MSH2, MSH3, MSH6, MUTYH, NBN, NF1, NTHL1, PALB2, PMS2, POLD1, POLE, PTEN, RAD51C, RAD51D, RECQL SMAD4, SMARCA4, STK11, TP53    Result: Negative - No Clinically Significant Variants Detected      Assessment:   A negative result significantly reduces the likelihood that Silver Raphael has a hereditary cancer syndrome  However, this testing is unable to completely rule out the presence of hereditary cancer  It remains possible that:  - There is a variant in an area of a gene which was not tested or there is a variant not detectable due to technical limitations of this test      - There is a variant in another gene that was not included in this test or in a gene not known to be linked to cancer or tumors  - A family member has a genetic variant that the patient did not inherit  - The cancer in the family is sporadic and is related to non-hereditary factors  Risks and Testing for Family Members:  Despite a negative result, Seun's first-degree relatives may be at increased risk for the cancers based on the family history  We recommend they discuss screening and management recommendations with their healthcare providers  At this time we do not recommend testing for Silver Laboys children based on his negative test result  Silver Laboys children still need to consider the history of cancer on the other side of their family when determining their risks       If Silver Raphael has any affected family members with a cancer diagnosis, especially at a young age, they may still consider genetic testing  Relatives who wish to pursue genetic testing can reach out to the [phone]) to schedule an appointment or visit www [facility] to identify a local genetic counselor  Plan:   There are no additional recommendations based on Seun's negative result  he should continue cancer screening and medical management as clinically indicated and as determined appropriate by his healthcare providers  Negative Result: Mynor Lopez was strongly encouraged to contact us regarding any changes in his personal or family history of cancer as these changes could alter our recommendation regarding genetic testing and/or cancer screening

## 2022-12-07 NOTE — PROGRESS NOTES
Patients [facility] were rescheduled to 1/31/22  Message sent to Rad Onc nurse to schedule SIM

## 2022-12-07 NOTE — TELEPHONE ENCOUNTER
----- Message from Carolina Alexandre sent at 12/6/2022  4:17 PM EST -----  Regarding: pain  Contact: [phone]  The pain in my right side has returned since the gabapentin  level has been reduced   Can we do a 500 or 600 level instead of 300 level

## 2022-12-08 ENCOUNTER — CONSULT (OUTPATIENT)
Dept: CARDIOLOGY CLINIC | Facility: CLINIC | Age: 77
End: 2022-12-08

## 2022-12-08 VITALS
DIASTOLIC BLOOD PRESSURE: 60 MMHG | BODY MASS INDEX: 31.52 KG/M2 | WEIGHT: 208 LBS | HEIGHT: 68 IN | SYSTOLIC BLOOD PRESSURE: 110 MMHG | HEART RATE: 74 BPM

## 2022-12-08 DIAGNOSIS — I50.42 CHRONIC COMBINED SYSTOLIC AND DIASTOLIC HEART FAILURE (HCC): ICD-10-CM

## 2022-12-08 DIAGNOSIS — I10 PRIMARY HYPERTENSION: ICD-10-CM

## 2022-12-08 DIAGNOSIS — E78.5 HYPERLIPIDEMIA, UNSPECIFIED HYPERLIPIDEMIA TYPE: ICD-10-CM

## 2022-12-08 DIAGNOSIS — I25.10 CORONARY ARTERY DISEASE INVOLVING NATIVE CORONARY ARTERY OF NATIVE HEART WITHOUT ANGINA PECTORIS: Primary | Chronic | ICD-10-CM

## 2022-12-08 NOTE — PROGRESS NOTES
Cardiology Follow up     Adan Fletcher  [id number]  1945  [facility] [address] [id number]      1  Coronary artery disease involving native coronary artery of native heart without angina pectoris  POCT ECG      2  Primary hypertension        3  Chronic combined systolic and diastolic heart failure ([address] )        4  Hyperlipidemia, unspecified hyperlipidemia type            Discussion/Summary:  1  Coronary artery disease with PCI and cardiac catheterization March 2021 with stable disease without need for revascularization  2  Currently asymptomatic PVCs  3   Mild aortic stenosis with mild aortic regurgitation  4  Mild to moderate mitral regurgitation  5  Heart failure with mildly reduced ejection fraction LVEF 45%  6  Hypertension  7  Hyperlipidemia  8  Obesity  9  Chronic lung disease requiring intermittent oxygen    -Transthoracic echocardiogram 9/29/2022 showing left ventricular systolic function mildly reduced estimated LVEF 45% with borderline global longitudinal strain at -16% with mild global hypokinesis and grade 1 diastolic dysfunction with mild aortic regurgitation, mild aortic stenosis, mild to moderate mitral regurgitation    -EKG performed in the office today shows sinus rhythm heart rate 74 bpm with nonspecific ST abnormality   -Due to patient's history of swelling with lisinopril we will avoid ACE inhibitors and due to softer blood pressures I am limited in up titration of medical therapy however if patient's blood pressure were to improve would likely trial SGLT2 inhibitor therapy at that time  -We will continue aspirin 81 mg daily, furosemide 40 mg daily with potassium 20 M EQ daily, Crestor 40 mg daily, metoprolol succinate 50 mg in morning and 50 mg in evening  -Patient counseled on fluid and salt restriction to less than 1800 mg of sodium daily and less than 1800 mL of fluid daily along with monitoring daily weights  -According to revised cardiac risk index patient is intermediate-high risk for operative procedures  Patient is planned to begin radiation therapy in January 2023 for prostate cancer  Patient understands these risks and wishes to proceed with radiation therapy as planned  In that setting patient is appropriate risk to proceed with monitoring   -Patient will be seen in 3 months or sooner if necessary  -Patient counseled if he were to have any warning or alarm type symptoms he is to seek emergency medical care immediately  History of Present Illness:  -Patient is a 69-year-old male with hypertension, hyperlipidemia, obesity, COPD with intermittent/occasional oxygen use on chronic antibiotic therapy with obstructive sleep apnea compliant with CPAP therapy, known coronary artery disease with MI in 1995 with PCI to RCA, stenting in 1999 and in 2001 involving RCA and then again in 2003 with stenting to LAD with cardiac catheterization in March 2021 showing no significant disease in the left main with proximal LAD 10% stenosed at site of prior stent with left circumflex artery with minimal luminal irregularities and mid RCA with 50% stenosis at the site of prior stent along with posterior lateral segments with disease supplied by collaterals from the distal circumflex with no significant coronary disease requiring revascularization at that time    Patient was also found to have reduced left ventricular systolic function with significant PVC burden was seen and evaluated by electrophysiology however in the setting of chronic antibiotic therapy was recommended that if patient able to tolerate more up titration of beta-blocker therapy prior to alternative medical therapy would be more beneficial and unfortunately was also diagnosed with prostate cancer and presents to the office today for scheduled follow-up   -Patient will be undergoing radiation therapy for his prostate cancer and presents to the office today for evaluation prior to initiation of this   -Patient was having episodes of hypotension on medical therapy requiring reduction in beta-blocker therapy recently however has noted that since that time blood pressures have been reasonably stable in the low 021-493 mmHg systolic range with no significant recurrence of symptoms   -Currently in the office today he denies any chest pain, palpitations, lightheadedness or dizziness, loss of consciousness or shortness of breath, orthopnea and states that overall apart from some arthritic aches and pains with the weather change he feels well  He does note every once in a while he does get some intermittent minimal lower extremity edema that improves with elevation at night but has been doing very well on his diuretic dose      Patient Active Problem List   Diagnosis   • Hyperlipidemia   • Coronary artery disease involving native coronary artery of native heart without angina pectoris   • Bilateral carotid artery stenosis   • Hypertension   • Chronic obstructive pulmonary disease with acute exacerbation (HCC)   • Oxygen dependent   • Depression, recurrent (HCC)   • S/P carotid endarterectomy   • Stented coronary artery   • Vertigo   • Anisometropia   • Osteoarthritis of spinal facet joint   • Chronic ischemic heart disease   • Chronic diastolic (congestive) heart failure (HCC)   • Diverticular disease of colon   • Dry eye syndrome   • Gastroesophageal reflux disease   • Hearing loss   • Elevated PSA   • Hypoxia   • Lens replaced by other means   • Lumbar radiculopathy   • Multinodular goiter   • Nuclear senile cataract   • Obesity   • Occlusion and stenosis of carotid artery   • Osteoarthritis of hip   • Prediabetes   • Psychosexual dysfunction with inhibited sexual excitement   • Sleep apnea   • Solitary pulmonary nodule   • Thyroid nodule   • Guyon syndrome, left   • Costochondral chest pain   • Intermittent chest pain   • Abnormal nuclear stress test   • Right hip pain   • Primary insomnia   • Chronic right-sided thoracic back pain   • Stage 3a chronic kidney disease (HCC)   • Hoarseness or changing voice   • Chronic bilateral low back pain with right-sided sciatica   • Mid back pain   • Thoracic radiculopathy   • Chronic pain syndrome   • Urinary frequency   • Incomplete bladder emptying   • Intercostal neuralgia   • Cubital tunnel syndrome on left   • Carpal tunnel syndrome on left   • Elevated MCV   • Prostate cancer (HCC)   • Acute on chronic respiratory failure with hypoxia (HCC)   • Obstructive sleep apnea syndrome in adult   • Sensorineural hearing loss, bilateral   • RSV (respiratory syncytial virus infection)   • Right lower quadrant abdominal pain     Past Medical History:   Diagnosis Date   • Bilateral carotid artery stenosis    • Cancer (HCC)     skin   • Chronic diastolic heart failure (HCC)    • Chronic ischemic heart disease    • Chronic kidney disease     stage 3   • Colon polyp    • COPD (chronic obstructive pulmonary disease) ([facility])    • Coronary artery disease     hx stents, MI, PCI   • COVID 11/2021   • CPAP (continuous positive airway pressure) dependence    • Hearing loss    • History of transfusion 1995   • Hyperlipidemia    • Hypertension    • MI (myocardial infarction) [address] [address] ) 1995   • Myocardial infarction ([facility] [address] ) 1995   • Pneumonia    • Prostate cancer ([facility] )    • RSV (respiratory syncytial virus infection) 10/2022   • S/P carotid endarterectomy    • Shortness of breath     O2 2 l/nc PRN   • Sleep apnea    • Sleep apnea, obstructive    • Stented coronary artery      Social History     Socioeconomic History   • Marital status: /Civil Union     Spouse name: Not on file   • Number of children: Not on file   • Years of education: Not on file   • Highest education level: Not on file   Occupational History   • Not on file   Tobacco Use   • Smoking status: Former Packs/day: 2 00     Years: 35 00     Pack years: 70 00     Types: Cigarettes     Quit date: 1995     Years since quittin 4   • Smokeless tobacco: Never   Vaping Use   • Vaping Use: Never used   Substance and Sexual Activity   • Alcohol use: Not Currently   • Drug use: Never   • Sexual activity: Not on file   Other Topics Concern   • Not on file   Social History Narrative   • Not on file     Social Determinants of Health     Financial Resource Strain: Not on file   Food Insecurity: No Food Insecurity   • Worried About Running Out of Food in the Last Year: Never true   • Ran Out of Food in the Last Year: Never true   Transportation Needs: No Transportation Needs   • Lack of Transportation (Medical): No   • Lack of Transportation (Non-Medical): No   Physical Activity: Not on file   Stress: Not on file   Social Connections: Not on file   Intimate Partner Violence: Not on file   Housing Stability: Low Risk    • Unable to Pay for Housing in the Last Year: No   • Number of Places Lived in the Last Year: 1   • Unstable Housing in the Last Year: No      Family History   Problem Relation Age of Onset   • Heart attack Mother    • Dementia Mother    • No Known Problems Father      Past Surgical History:   Procedure Laterality Date   • APPENDECTOMY     • CARDIAC CATHETERIZATION  2021    left main with no significant disease, proximal LAD with 10% stenosis at the site of prior stent, left circumflex artery with minimal luminal irregularities mid RCA with 50% stenosis at site of prior stent and PL segment with distal disease supplied by collaterals from the distal circumflex with no significant CAD requiring revascularization at that time     • CATARACT EXTRACTION, BILATERAL Bilateral    • COLONOSCOPY     • CORONARY ANGIOPLASTY WITH STENT PLACEMENT      RCA   • CORONARY ANGIOPLASTY WITH STENT PLACEMENT      RCA   • CORONARY ANGIOPLASTY WITH STENT PLACEMENT      LAD   • EYE SURGERY     • IL BIOPSY OF PROSTATE,NEEDLE,TRANSPERINEAL N/A 09/13/2022    Procedure: TRANSPERINEAL MRI FUSION  BIOPSY PROSTATE;  Surgeon: Yumiko Gutierrez MD;  Location: [facility];  Service: Urology   • MI REVISE MEDIAN N/CARPAL TUNNEL SURG Left 07/22/2022    Procedure: RELEASE CARPAL TUNNEL;  Surgeon: Valentino Lai MD;  Location: [facility];  Service: Orthopedics   • MI REVISE ULNAR NERVE AT ELBOW Left 07/22/2022    Procedure: RELEASE CUBITAL TUNNEL;  Surgeon: Valentino Lai MD;  Location: [facility];  Service: Orthopedics   • MI REVISE ULNAR NERVE AT WRIST Left 07/22/2022    Procedure: Chepe Lucy;  Surgeon: Valentino Lai MD;  Location: [facility];  Service: Orthopedics   • SKIN CANCER EXCISION  2012    chin-per pt, basal cell  also right ankle       Current Outpatient Medications:   •  albuterol (2 5 mg/3 mL) 0 083 % nebulizer solution, Take 3 mL (2 5 mg total) by nebulization every 6 (six) hours as needed for wheezing or shortness of breath, Disp: 60 mL, Rfl: 1  •  arformoterol (BROVANA) 15 mcg/2 mL nebulizer solution, Take 2 mL (15 mcg total) by nebulization 2 (two) times a day, Disp: 120 mL, Rfl: 2  •  aspirin 81 mg chewable tablet, CHEW ONE TABLET BY MOUTH EVERY DAY, Disp: , Rfl:   •  azithromycin (ZITHROMAX) 500 MG tablet, Take 1 tablet (500 mg total) by mouth 3 (three) times a week (Patient taking differently: Take 500 mg by mouth 3 (three) times a week), Disp: 12 tablet, Rfl: 2  •  Blood Pressure Monitor KIT, Use daily (Patient taking differently: Use 40 mg daily), Disp: 1 kit, Rfl: 0  •  budesonide (PULMICORT) 0 5 mg/2 mL nebulizer solution, Take 2 mL (0 5 mg total) by nebulization every 12 (twelve) hours Rinse mouth after use , Disp: 120 mL, Rfl: 0  •  doxycycline hyclate (VIBRAMYCIN) 100 mg capsule, Take 1 capsule (100 mg total) by mouth every 12 (twelve) hours for 7 days, Disp: 14 capsule, Rfl: 0  •  famotidine (PEPCID) 20 mg tablet, Take 20 mg by mouth daily at bedtime, Disp: , Rfl:   •  fluticasone (FLONASE) 50 mcg/act nasal spray, into each nostril as needed , Disp: , Rfl:   •  furosemide (LASIX) 40 mg tablet, Take 1 tablet (40 mg total) by mouth daily (Patient taking differently: Take 40 mg by mouth every morning), Disp: 100 tablet, Rfl: 3  •  gabapentin (NEURONTIN) 300 mg capsule, Take 1 capsule (300 mg total) by mouth 3 (three) times a day (Patient taking differently: Take 600 mg by mouth 2 (two) times a day), Disp: 270 capsule, Rfl: 0  •  nitroglycerin (NITROSTAT) 0 4 mg SL tablet, DISSOLVE ONE TABLET UNDER THE TONGUE  PRN, Disp: , Rfl:   •  potassium chloride (Klor-Con) 10 mEq tablet, Take 2 tablets (20 mEq total) by mouth daily, Disp: 200 tablet, Rfl: 3  •  predniSONE 10 mg tablet, Take 4 tablets (40 mg total) by mouth daily for 3 days, THEN 3 tablets (30 mg total) daily for 3 days, THEN 2 tablets (20 mg total) daily for 3 days, THEN 1 tablet (10 mg total) daily for 3 days, THEN 0 5 tablets (5 mg total) daily for 3 days  , Disp: 32 tablet, Rfl: 0  •  rosuvastatin (CRESTOR) 40 MG tablet, TAKE 1 TABLET DAILY (Patient taking differently: Take 40 mg by mouth daily at bedtime), Disp: 100 tablet, Rfl: 3  •  Tiotropium Bromide Monohydrate (SPIRIVA RESPIMAT IN), Inhale every morning, Disp: , Rfl:   •  Toprol XL 50 MG 24 hr tablet, TAKE 1 TABLET TWICE A DAY , Disp: 180 tablet, Rfl: 3  •  Fluticasone-Salmeterol (Advair) 250-50 mcg/dose inhaler, Inhale 1 puff 2 (two) times a day Rinse mouth after use , Disp: , Rfl:   Allergies   Allergen Reactions   • Lisinopril Swelling and Cough   • Tetanus Antitoxin Anaphylaxis   • Tetanus Toxoid Anaphylaxis and Swelling         Labs:  Office Visit on 12/01/2022   Component Date Value   • SARS-CoV-2 12/01/2022 Positive (A)    • INFLUENZA A PCR 12/01/2022 Negative    • INFLUENZA B PCR 12/01/2022 Negative    Appointment on 11/16/2022   Component Date Value   • Urine Culture 11/16/2022 No Growth <1000 cfu/mL    Appointment on 11/15/2022   Component Date Value   • NT-proBNP 11/15/2022 239    • Sodium 11/15/2022 140    • Potassium 11/15/2022 3 5    • Chloride 11/15/2022 98    • CO2 11/15/2022 37 (H)    • ANION GAP 11/15/2022 5    • BUN 11/15/2022 27 (H)    • Creatinine 11/15/2022 1 29    • Glucose, Fasting 11/15/2022 98    • Calcium 11/15/2022 8 9    • AST 11/15/2022 21    • ALT 11/15/2022 29    • Alkaline Phosphatase 11/15/2022 67    • Total Protein 11/15/2022 6 2 (L)    • Albumin 11/15/2022 3 8    • Total Bilirubin 11/15/2022 0 86    • eGFR 11/15/2022 52    • WBC 11/15/2022 7 84    • RBC 11/15/2022 5 39    • Hemoglobin 11/15/2022 17 0    • Hematocrit 11/15/2022 54 1 (H)    • MCV 11/15/2022 100 (H)    • MCH 11/15/2022 31 5    • MCHC 11/15/2022 31 4    • RDW 11/15/2022 14 7    • MPV 11/15/2022 10 3    • Platelets 09/32/9894 152    • Miscellaneous Lab Test R* 11/15/2022     • Segmented % 11/15/2022 79 (H)    • Bands % 11/15/2022 1    • Lymphocytes % 11/15/2022 10 (L)    • Monocytes % 11/15/2022 7    • Eosinophils, % 11/15/2022 1    • Basophils % 11/15/2022 0    • Atypical Lymphocytes % 11/15/2022 2 (H)    • Absolute Neutrophils 11/15/2022 6 27    • Lymphocytes Absolute 11/15/2022 0 78    • Monocytes Absolute 11/15/2022 0 55    • Eosinophils Absolute 11/15/2022 0 08    • Basophils Absolute 11/15/2022 0 00    • RBC Morphology 11/15/2022 Normal    • Platelet Estimate 48/75/0433 Adequate    • Giant PLTs 11/15/2022 Present    • Large Platelet 70/30/4765 Present    Admission on 10/19/2022, Discharged on 10/25/2022   Component Date Value   • WBC 10/19/2022 9 38    • RBC 10/19/2022 5 68 (H)    • Hemoglobin 10/19/2022 18 2 (H)    • Hematocrit 10/19/2022 57 5 (H)    • MCV 10/19/2022 101 (H)    • MCH 10/19/2022 32 0    • MCHC 10/19/2022 31 7    • RDW 10/19/2022 14 6    • MPV 10/19/2022 10 9    • Platelets 59/70/0616 155    • nRBC 10/19/2022 0    • Neutrophils Relative 10/19/2022 81 (H)    • Immat GRANS % 10/19/2022 1    • Lymphocytes Relative 10/19/2022 3 (L)    • Monocytes Relative 10/19/2022 14 (H)    • Eosinophils Relative 10/19/2022 0    • Basophils Relative 10/19/2022 1    • Neutrophils Absolute 10/19/2022 7 69 (H)    • Immature Grans Absolute 10/19/2022 0 08    • Lymphocytes Absolute 10/19/2022 0 28 (L)    • Monocytes Absolute 10/19/2022 1 28 (H)    • Eosinophils Absolute 10/19/2022 0 00    • Basophils Absolute 10/19/2022 0 05    • Sodium 10/19/2022 137    • Potassium 10/19/2022 4 3    • Chloride 10/19/2022 98    • CO2 10/19/2022 30    • ANION GAP 10/19/2022 9    • BUN 10/19/2022 27 (H)    • Creatinine 10/19/2022 1 43 (H)    • Glucose 10/19/2022 108    • Calcium 10/19/2022 8 5    • AST 10/19/2022 34    • ALT 10/19/2022 33    • Alkaline Phosphatase 10/19/2022 72    • Total Protein 10/19/2022 6 4    • Albumin 10/19/2022 4 1    • Total Bilirubin 10/19/2022 0 74    • eGFR 10/19/2022 46    • BNP 10/19/2022 97    • hs TnI 0hr 10/19/2022 24    • hs TnI 2hr 10/19/2022 21    • Delta 2hr hsTnI 10/19/2022 -3    • Procalcitonin 10/20/2022 0 15    • WBC 10/20/2022 7 36    • RBC 10/20/2022 5 15    • Hemoglobin 10/20/2022 16 4    • Hematocrit 10/20/2022 52 0 (H)    • MCV 10/20/2022 101 (H)    • MCH 10/20/2022 31 8    • MCHC 10/20/2022 31 5    • RDW 10/20/2022 14 4    • Platelets 86/46/5049 152    • MPV 10/20/2022 10 6    • Sodium 10/20/2022 137    • Potassium 10/20/2022 4 3    • Chloride 10/20/2022 97    • CO2 10/20/2022 33 (H)    • ANION GAP 10/20/2022 7    • BUN 10/20/2022 30 (H)    • Creatinine 10/20/2022 1 34 (H)    • Glucose 10/20/2022 163 (H)    • Calcium 10/20/2022 8 9    • eGFR 10/20/2022 50    • Ventricular Rate 10/19/2022 86    • Atrial Rate 10/19/2022 86    • RI Interval 10/19/2022 160    • QRSD Interval 10/19/2022 92    • QT Interval 10/19/2022 362    • QTC Interval 10/19/2022 433    • P Axis 10/19/2022 75    • QRS Axis 10/19/2022 73    • T Wave Axis 10/19/2022 67    • SARS-CoV-2 10/20/2022 Negative    • INFLUENZA A PCR 10/20/2022 Negative    • INFLUENZA B PCR 10/20/2022 Negative    • RSV PCR 10/20/2022 Positive (A)    • Sodium 10/21/2022 137    • Potassium 10/21/2022 4 2    • Chloride 10/21/2022 98    • CO2 10/21/2022 33 (H)    • ANION GAP 10/21/2022 6    • BUN 10/21/2022 37 (H)    • Creatinine 10/21/2022 1 23    • Glucose 10/21/2022 156 (H)    • Calcium 10/21/2022 8 7    • eGFR 10/21/2022 55    • WBC 10/21/2022 14 95 (H)    • RBC 10/21/2022 5 74 (H)    • Hemoglobin 10/21/2022 18 2 (H)    • Hematocrit 10/21/2022 56 8 (H)    • MCV 10/21/2022 99 (H)    • MCH 10/21/2022 31 7    • MCHC 10/21/2022 32 0    • RDW 10/21/2022 14 3    • MPV 10/21/2022 10 5    • Platelets 82/92/9771 162    • nRBC 10/21/2022 0    • Neutrophils Relative 10/21/2022 87 (H)    • Immat GRANS % 10/21/2022 1    • Lymphocytes Relative 10/21/2022 3 (L)    • Monocytes Relative 10/21/2022 9    • Eosinophils Relative 10/21/2022 0    • Basophils Relative 10/21/2022 0    • Neutrophils Absolute 10/21/2022 12 92 (H)    • Immature Grans Absolute 10/21/2022 0 12    • Lymphocytes Absolute 10/21/2022 0 48 (L)    • Monocytes Absolute 10/21/2022 1 40 (H)    • Eosinophils Absolute 10/21/2022 0 00    • Basophils Absolute 10/21/2022 0 03    • WBC 10/22/2022 16 30 (H)    • RBC 10/22/2022 5 62    • Hemoglobin 10/22/2022 18 4 (H)    • Hematocrit 10/22/2022 56 5 (H)    • MCV 10/22/2022 101 (H)    • MCH 10/22/2022 32 7    • MCHC 10/22/2022 32 6    • RDW 10/22/2022 14 4    • Platelets 91/84/9237 175    • MPV 10/22/2022 10 7    • Sodium 10/22/2022 137    • Potassium 10/22/2022 4 5    • Chloride 10/22/2022 97    • CO2 10/22/2022 33 (H)    • ANION GAP 10/22/2022 7    • BUN 10/22/2022 40 (H)    • Creatinine 10/22/2022 1 23    • Glucose 10/22/2022 161 (H)    • Calcium 10/22/2022 8 1 (L)    • eGFR 10/22/2022 55    • WBC 10/23/2022 18 15 (H)    • RBC 10/23/2022 5 77 (H)    • Hemoglobin 10/23/2022 18 3 (H)    • Hematocrit 10/23/2022 57 5 (H)    • MCV 10/23/2022 100 (H)    • MCH 10/23/2022 31 7    • MCHC 10/23/2022 31 8    • RDW 10/23/2022 14 3    • Platelets 13/59/5961 174    • MPV 10/23/2022 10 2    • Sodium 10/23/2022 138    • Potassium 10/23/2022 4 6    • Chloride 10/23/2022 95 (L)    • CO2 10/23/2022 35 (H)    • ANION GAP 10/23/2022 8    • BUN 10/23/2022 39 (H)    • Creatinine 10/23/2022 1 24    • Glucose 10/23/2022 162 (H)    • Calcium 10/23/2022 8 3 (L)    • eGFR 10/23/2022 55    • WBC 10/24/2022 18 63 (H)    • RBC 10/24/2022 5 86 (H)    • Hemoglobin 10/24/2022 18 2 (H)    • Hematocrit 10/24/2022 58 3 (H)    • MCV 10/24/2022 100 (H)    • MCH 10/24/2022 31 1    • MCHC 10/24/2022 31 2 (L)    • RDW 10/24/2022 14 1    • Platelets 65/24/0705 185    • MPV 10/24/2022 10 7    • Sodium 10/24/2022 136    • Potassium 10/24/2022 4 5    • Chloride 10/24/2022 95 (L)    • CO2 10/24/2022 34 (H)    • ANION GAP 10/24/2022 7    • BUN 10/24/2022 39 (H)    • Creatinine 10/24/2022 1 27    • Glucose 10/24/2022 181 (H)    • Calcium 10/24/2022 8 4    • eGFR 10/24/2022 53    • WBC 10/25/2022 19 22 (H)    • RBC 10/25/2022 5 72 (H)    • Hemoglobin 10/25/2022 17 7 (H)    • Hematocrit 10/25/2022 57 0 (H)    • MCV 10/25/2022 100 (H)    • MCH 10/25/2022 30 9    • MCHC 10/25/2022 31 1 (L)    • RDW 10/25/2022 14 1    • Platelets 19/04/1251 190    • MPV 10/25/2022 10 8    • Sodium 10/25/2022 136    • Potassium 10/25/2022 4 8    • Chloride 10/25/2022 96    • CO2 10/25/2022 33 (H)    • ANION GAP 10/25/2022 7    • BUN 10/25/2022 38 (H)    • Creatinine 10/25/2022 1 22    • Glucose 10/25/2022 176 (H)    • Calcium 10/25/2022 8 2 (L)    • eGFR 10/25/2022 56    Admission on 10/15/2022, Discharged on 10/16/2022   Component Date Value   • WBC 10/15/2022 10 99 (H)    • RBC 10/15/2022 5 61    • Hemoglobin 10/15/2022 17 8 (H)    • Hematocrit 10/15/2022 57 3 (H)    • MCV 10/15/2022 102 (H)    • MCH 10/15/2022 31 7    • MCHC 10/15/2022 31 1 (L)    • RDW 10/15/2022 14 3    • MPV 10/15/2022 10 5    • Platelets 02/94/0766 164    • nRBC 10/15/2022 0    • Neutrophils Relative 10/15/2022 77 (H)    • Immat GRANS % 10/15/2022 2    • Lymphocytes Relative 10/15/2022 8 (L)    • Monocytes Relative 10/15/2022 12    • Eosinophils Relative 10/15/2022 1    • Basophils Relative 10/15/2022 0    • Neutrophils Absolute 10/15/2022 8 48 (H)    • Immature Grans Absolute 10/15/2022 0 16    • Lymphocytes Absolute 10/15/2022 0 85    • Monocytes Absolute 10/15/2022 1 36 (H)    • Eosinophils Absolute 10/15/2022 0 10    • Basophils Absolute 10/15/2022 0 04    • Sodium 10/15/2022 142    • Potassium 10/15/2022 3 7    • Chloride 10/15/2022 100    • CO2 10/15/2022 37 (H)    • ANION GAP 10/15/2022 5    • BUN 10/15/2022 20    • Creatinine 10/15/2022 1 26    • Glucose 10/15/2022 67    • Calcium 10/15/2022 9 4    • eGFR 10/15/2022 54    • Protime 10/15/2022 13 4    • INR 10/15/2022 1 02    • PTT 10/15/2022 26    • hs TnI 0hr 10/15/2022 11    • BNP 10/15/2022 46    • SARS-CoV-2 10/15/2022 Negative    • INFLUENZA A PCR 10/15/2022 Negative    • INFLUENZA B PCR 10/15/2022 Negative    • RSV PCR 10/15/2022 Negative    • hs TnI 2hr 10/15/2022 9    • Delta 2hr hsTnI 10/15/2022 -2    • hs TnI 4hr 10/15/2022 9    • Delta 4hr hsTnI 10/15/2022 -2    • Procalcitonin 10/15/2022 0 11    • Procalcitonin 10/16/2022 0 10    • Ventricular Rate 10/15/2022 84    • Atrial Rate 10/15/2022 84    • [place] Interval 10/15/2022 164    • QRSD Interval 10/15/2022 94    • QT Interval 10/15/2022 386    • QTC Interval 10/15/2022 456    • P Axis 10/15/2022 77    • QRS Axis 10/15/2022 75    • T Wave [place] 10/15/2022 64    Appointment on 10/14/2022   Component Date Value   • Vitamin B-12 10/14/2022 549    • TSH 3RD GENERATON 10/14/2022 0 948    • WBC 10/14/2022 8 35    • RBC 10/14/2022 5 74 (H)    • Hemoglobin 10/14/2022 18 1 (H)    • Hematocrit 10/14/2022 57 4 (H)    • MCV 10/14/2022 100 (H)    • MCH 10/14/2022 31 5    • MCHC 10/14/2022 31 5    • RDW 10/14/2022 14 4    • MPV 10/14/2022 11 1    • Platelets 63/06/4870 174    • nRBC 10/14/2022 0    • Neutrophils Relative 10/14/2022 71    • Immat GRANS % 10/14/2022 0    • Lymphocytes Relative 10/14/2022 13 (L)    • Monocytes Relative 10/14/2022 13 (H)    • Eosinophils Relative 10/14/2022 2    • Basophils Relative 10/14/2022 1    • Neutrophils Absolute 10/14/2022 6 03    • Immature Grans Absolute 10/14/2022 0 02    • Lymphocytes Absolute 10/14/2022 1 04    • Monocytes Absolute 10/14/2022 1 05    • Eosinophils Absolute 10/14/2022 0 16    • Basophils Absolute 10/14/2022 0 05    • Sodium 10/14/2022 139    • Potassium 10/14/2022 4 4    • Chloride 10/14/2022 105    • CO2 10/14/2022 30    • ANION GAP 10/14/2022 4    • BUN 10/14/2022 20    • Creatinine 10/14/2022 1 42 (H)    • Glucose, Fasting 10/14/2022 106 (H)    • Calcium 10/14/2022 9 5    • eGFR 10/14/2022 46    • Miscellaneous Lab Test R* 10/14/2022          Imaging: No results found  Review of Systems:  Review of Systems   Constitutional: Negative for chills, diaphoresis, fatigue and fever  HENT: Negative for trouble swallowing and voice change  Eyes: Negative for pain and redness  Respiratory: Negative for shortness of breath and wheezing  Cardiovascular: Negative for chest pain, palpitations and leg swelling  Gastrointestinal: Negative for abdominal pain, constipation, diarrhea, nausea and vomiting  Genitourinary: Negative for dysuria  Musculoskeletal: Positive for arthralgias  Negative for neck pain and neck stiffness  Skin: Negative for rash  Neurological: Negative for dizziness, syncope, light-headedness and headaches  Psychiatric/Behavioral: Negative for agitation and confusion  All other systems reviewed and are negative  Vitals:    12/08/22 1050   BP: 110/60   Pulse: 74   Weight: 94 3 kg (208 lb)   Height: 5' 8" (1 727 m)     Vitals:    12/08/22 1050   Weight: 94 3 kg (208 lb)     Height: 5' 8" (172 7 cm)     Physical Exam:  Physical Exam  Vitals reviewed  Constitutional:       General: He is not in acute distress  Appearance: He is obese  He is not diaphoretic     HENT:      Head: Normocephalic and atraumatic  Eyes:      General:         Right eye: No discharge  Left eye: No discharge  Neck:      Comments: Trachea midline, no JVD present  Cardiovascular:      Rate and Rhythm: Normal rate and regular rhythm  Heart sounds: No friction rub  Pulmonary:      Effort: Pulmonary effort is normal  No respiratory distress  Breath sounds: Wheezing present  Chest:      Chest wall: No tenderness  Abdominal:      General: Bowel sounds are normal       Palpations: Abdomen is soft  Tenderness: There is no abdominal tenderness  There is no rebound  Musculoskeletal:      Right lower leg: No edema (trace)  Left lower leg: No edema  Skin:     General: Skin is warm and dry  Neurological:      Mental Status: He is alert  Comments: Awake, alert, able to answer questions appropriately, able to move extremities bilaterally     Psychiatric:         Mood and Affect: Mood normal          Behavior: Behavior normal

## 2022-12-29 ENCOUNTER — TELEPHONE (OUTPATIENT)
Dept: FAMILY MEDICINE CLINIC | Facility: CLINIC | Age: 77
End: 2022-12-29

## 2022-12-29 DIAGNOSIS — Z20.828 EXPOSURE TO THE FLU: Primary | ICD-10-CM

## 2022-12-29 RX ORDER — OSELTAMIVIR PHOSPHATE 75 MG/1
75 CAPSULE ORAL DAILY
Qty: 7 CAPSULE | Refills: 0 | Status: SHIPPED | OUTPATIENT
Start: 2022-12-29 | End: 2023-01-05

## 2022-12-29 NOTE — TELEPHONE ENCOUNTER
Advised patient as per provider, Patient verbally understood is agreeable to the medication,No new allergies know, christa martinez, he will follow up with the pharmacy to ensure that it is filled,     he wanted to let you know ,Garfield Brice is in the hospital hes going to see her today again, at the [facility], he will call if theres anything that is needed,

## 2022-12-29 NOTE — TELEPHONE ENCOUNTER
Patient's wife was seen yesterday and is Flu positive, I would recommend Tamiflu prophylaxis for Selenedoug Tierney if he is agreeable?

## 2022-12-30 ENCOUNTER — OFFICE VISIT (OUTPATIENT)
Dept: PAIN MEDICINE | Facility: CLINIC | Age: 77
End: 2022-12-30

## 2022-12-30 VITALS
DIASTOLIC BLOOD PRESSURE: 69 MMHG | SYSTOLIC BLOOD PRESSURE: 101 MMHG | HEIGHT: 68 IN | BODY MASS INDEX: 31.52 KG/M2 | HEART RATE: 84 BPM | WEIGHT: 208 LBS

## 2022-12-30 DIAGNOSIS — G89.29 CHRONIC BILATERAL LOW BACK PAIN WITH RIGHT-SIDED SCIATICA: ICD-10-CM

## 2022-12-30 DIAGNOSIS — M54.41 CHRONIC BILATERAL LOW BACK PAIN WITH RIGHT-SIDED SCIATICA: ICD-10-CM

## 2022-12-30 DIAGNOSIS — G89.29 CHRONIC RIGHT-SIDED THORACIC BACK PAIN: ICD-10-CM

## 2022-12-30 DIAGNOSIS — G89.4 CHRONIC PAIN SYNDROME: Primary | ICD-10-CM

## 2022-12-30 DIAGNOSIS — G58.8 INTERCOSTAL NEURALGIA: ICD-10-CM

## 2022-12-30 DIAGNOSIS — M54.6 CHRONIC RIGHT-SIDED THORACIC BACK PAIN: ICD-10-CM

## 2022-12-30 DIAGNOSIS — M54.16 LUMBAR RADICULOPATHY: ICD-10-CM

## 2022-12-30 NOTE — PROGRESS NOTES
Assessment:  1  Chronic pain syndrome    2  Chronic right-sided thoracic back pain    3  Intercostal neuralgia    4  Chronic bilateral low back pain with right-sided sciatica    5  Lumbar radiculopathy        Plan:  While the patient was in the office today, I did have a thorough conversation regarding their chronic pain syndrome, medication management, and treatment plan options  Patient is being seen for a follow-up visit  He continues with right-sided wall pain  He was last seen here on 11/30/2022 at which time he thought that gabapentin was causing dizziness  I advised him to decrease gabapentin to 100 mg 3 times daily  He states that he used up 100 mg capsules that he had at home and has now restarted 300 mg twice daily  He states that he has only had a couple episodes of dizziness since her last visit  Patient did not require refill of gabapentin during today's visit  Schedule ultrasound-guided right T8-9 and T9-10 intercostal nerve block  Complete risks and benefits including bleeding, infection, tissue reaction, nerve injury and allergic reaction were discussed  The approach was demonstrated using models and literature was provided  Verbal and written consent was obtained  Follow-up 1 month after the injection  History of Present Illness: The patient is a 65 y o  male who presents for a follow up office visit in regards to Rib Pain  The patient’s current symptoms include complaints of right-sided chest wall pain  Current pain levels a 4/10  Pain can go up to 10/10 at times  Quality pain is described as shooting and sharp  Current pain medications includes: Di 300 mg twice daily  The patient reports that this regimen is providing up to 50% pain relief  The patient is reporting no side effects from this pain medication regimen      I have personally reviewed and/or updated the patient's past medical history, past surgical history, family history, social history, current medications, allergies, and vital signs today  Review of Systems  Review of Systems   Constitutional: Negative for unexpected weight change  HENT: Negative for hearing loss  Eyes: Negative for visual disturbance  Respiratory: Negative for shortness of breath  Cardiovascular: Positive for chest pain  Negative for leg swelling  Gastrointestinal: Negative for constipation  Endocrine: Negative for polyuria  Genitourinary: Negative for difficulty urinating  Musculoskeletal: Positive for arthralgias and myalgias  Negative for gait problem and joint swelling  Decreased range of motion  Pain in extremity- rib area   Skin: Negative for rash  Neurological: Positive for dizziness  Negative for weakness and headaches  Psychiatric/Behavioral: Negative for decreased concentration  All other systems reviewed and are negative          Past Medical History:   Diagnosis Date   • Bilateral carotid artery stenosis    • Cancer (HCC)     skin   • Chronic diastolic heart failure (HCC)    • Chronic ischemic heart disease    • Chronic kidney disease     stage 3   • Colon polyp    • COPD (chronic obstructive pulmonary disease) ([facility])    • Coronary artery disease     hx stents, MI, PCI   • COVID 11/2021   • CPAP (continuous positive airway pressure) dependence    • Hearing loss    • History of transfusion 1995   • Hyperlipidemia    • Hypertension    • MI (myocardial infarction) ([facility] [address] ) 1995   • Myocardial infarction ([facility] [address] ) 1995   • Pneumonia    • [facility]    • RSV (respiratory syncytial virus infection) 10/2022   • S/P carotid endarterectomy    • Shortness of breath     O2 2 l/nc PRN   • Sleep apnea    • Sleep apnea, obstructive    • Stented coronary artery        Past Surgical History:   Procedure Laterality Date   • APPENDECTOMY     • CARDIAC CATHETERIZATION  03/18/2021    left main with no significant disease, proximal LAD with 10% stenosis at the site of prior stent, left circumflex artery with minimal luminal irregularities mid RCA with 50% stenosis at site of prior stent and PL segment with distal disease supplied by collaterals from the distal circumflex with no significant CAD requiring revascularization at that time     • CATARACT EXTRACTION, BILATERAL Bilateral    • COLONOSCOPY     • CORONARY ANGIOPLASTY WITH STENT PLACEMENT      RCA   • CORONARY ANGIOPLASTY WITH STENT PLACEMENT      RCA   • CORONARY ANGIOPLASTY WITH STENT PLACEMENT      LAD   • EYE SURGERY     • WV BX PROSTATE STRTCTC SATURATION SAMPLING IMG GID N/A 2022    Procedure: TRANSPERINEAL MRI FUSION  BIOPSY PROSTATE;  Surgeon: Porfirio Doss MD;  Location: [facility];  Service: Urology   • WV NEUROPLASTY &/TRANSPOS MEDIAN NRV Tamanna Brandonts Left 2022    Procedure: RELEASE CARPAL TUNNEL;  Surgeon: Mahendra Jimenez MD;  Location: [facility];  Service: Orthopedics   • WV NEUROPLASTY &/TRANSPOSITION ULNAR NERVE ELBOW Left 2022    Procedure: Vince Kocher;  Surgeon: Mahendra Jimenez MD;  Location[facility];  Service: Orthopedics   • [place] NEUROPLASTY &/TRANSPOSITION ULNAR NERVE WRIST Left 2022    Procedure: Surinder Fitzgerald;  Surgeon: Mahendra Jimenez MD;  Location: [facility];  Service: Orthopedics   • SKIN CANCER EXCISION  2012    chin-per pt, basal cell  also right ankle       Family History   Problem Relation Age of Onset   • Heart attack Mother    • Dementia Mother    • No Known Problems Father        Social History     Occupational History   • Not on file   Tobacco Use   • Smoking status: Former     Packs/day: 2 00     Years: 35 00     Pack years: 70 00     Types: Cigarettes     Quit date: 1995     Years since quittin 5   • Smokeless tobacco: Never   Vaping Use   • Vaping Use: Never used   Substance and Sexual Activity   • Alcohol use: Not Currently   • Drug use: Never   • Sexual activity: Not on file         Current Outpatient Medications:   •  albuterol (2 5 mg/3 mL) 0 083 % nebulizer solution, Take 3 mL (2 5 mg total) by nebulization every 6 (six) hours as needed for wheezing or shortness of breath, Disp: 60 mL, Rfl: 1  •  arformoterol (BROVANA) 15 mcg/2 mL nebulizer solution, Take 2 mL (15 mcg total) by nebulization 2 (two) times a day, Disp: 120 mL, Rfl: 2  •  aspirin 81 mg chewable tablet, CHEW ONE TABLET BY MOUTH EVERY DAY, Disp: , Rfl:   •  azithromycin (ZITHROMAX) 500 MG tablet, Take 1 tablet (500 mg total) by mouth 3 (three) times a week (Patient taking differently: Take 500 mg by mouth 3 (three) times a week), Disp: 12 tablet, Rfl: 2  •  Blood Pressure Monitor KIT, Use daily (Patient taking differently: Use 40 mg daily), Disp: 1 kit, Rfl: 0  •  budesonide (PULMICORT) 0 5 mg/2 mL nebulizer solution, Take 2 mL (0 5 mg total) by nebulization every 12 (twelve) hours Rinse mouth after use , Disp: 120 mL, Rfl: 0  •  famotidine (PEPCID) 20 mg tablet, Take 20 mg by mouth daily at bedtime, Disp: , Rfl:   •  fluticasone (FLONASE) 50 mcg/act nasal spray, into each nostril as needed , Disp: , Rfl:   •  furosemide (LASIX) 40 mg tablet, Take 1 tablet (40 mg total) by mouth daily (Patient taking differently: Take 40 mg by mouth every morning), Disp: 100 tablet, Rfl: 3  •  gabapentin (NEURONTIN) 300 mg capsule, Take 1 capsule (300 mg total) by mouth 3 (three) times a day (Patient taking differently: Take 600 mg by mouth 2 (two) times a day), Disp: 270 capsule, Rfl: 0  •  nitroglycerin (NITROSTAT) 0 4 mg SL tablet, DISSOLVE ONE TABLET UNDER THE TONGUE  PRN, Disp: , Rfl:   •  oseltamivir (TAMIFLU) 75 mg capsule, Take 1 capsule (75 mg total) by mouth daily for 7 days, Disp: 7 capsule, Rfl: 0  •  potassium chloride (Klor-Con) 10 mEq tablet, Take 2 tablets (20 mEq total) by mouth daily, Disp: 200 tablet, Rfl: 3  •  rosuvastatin (CRESTOR) 40 MG tablet, TAKE 1 TABLET DAILY (Patient taking differently: Take 40 mg by mouth daily at bedtime), Disp: 100 tablet, Rfl: 3  •  Tiotropium Bromide Monohydrate ([address]), Inhale every morning, Disp: , Rfl:   •  Toprol XL 50 MG 24 hr tablet, TAKE 1 TABLET TWICE A DAY , Disp: 180 tablet, Rfl: 3    Allergies   Allergen Reactions   • Lisinopril Swelling and Cough   • Tetanus Antitoxin Anaphylaxis   • Tetanus Toxoid Anaphylaxis and Swelling       Physical Exam:    /69   Pulse 84   Ht 5' 8" (1 727 m)   Wt 94 3 kg (208 lb)   BMI 31 63 kg/m²     Constitutional:normal, well developed, well nourished, alert, in no distress and non-toxic and no overt pain behavior  Eyes:anicteric  HEENT:grossly intact  Neck:supple, symmetric, trachea midline and no masses   Pulmonary:even and unlabored  Cardiovascular:No edema or pitting edema present  Skin:Normal without rashes or lesions and well hydrated  Psychiatric:Mood and affect appropriate  Neurologic:Cranial Nerves II-XII grossly intact  Musculoskeletal:Increased sensitivity to touch in the right T8-9 and T9-10 dermatomes  Imaging  No orders to display       No orders of the defined types were placed in this encounter

## 2023-01-05 ENCOUNTER — DOCUMENTATION (OUTPATIENT)
Dept: HEMATOLOGY ONCOLOGY | Facility: CLINIC | Age: 78
End: 2023-01-05

## 2023-01-05 NOTE — PROGRESS NOTES
Patient is scheduled for Radiation SIM on 2/7/23  I will make sure patient follows back up with urology 3-6 months after completion of radiation with PSA prior to the visit

## 2023-01-09 ENCOUNTER — TELEPHONE (OUTPATIENT)
Dept: OTHER | Facility: OTHER | Age: 78
End: 2023-01-09

## 2023-01-09 ENCOUNTER — HOSPITAL ENCOUNTER (INPATIENT)
Facility: HOSPITAL | Age: 78
LOS: 2 days | Discharge: HOME HEALTH | End: 2023-01-11
Attending: EMERGENCY MEDICINE | Admitting: STUDENT IN AN ORGANIZED HEALTH CARE EDUCATION/TRAINING PROGRAM

## 2023-01-09 ENCOUNTER — APPOINTMENT (EMERGENCY)
Dept: RADIOLOGY | Facility: HOSPITAL | Age: 78
End: 2023-01-09

## 2023-01-09 DIAGNOSIS — G58.8 INTERCOSTAL NEURALGIA: ICD-10-CM

## 2023-01-09 DIAGNOSIS — J44.9 COPD (CHRONIC OBSTRUCTIVE PULMONARY DISEASE) (HCC): ICD-10-CM

## 2023-01-09 DIAGNOSIS — U07.1 COVID: Primary | ICD-10-CM

## 2023-01-09 DIAGNOSIS — M54.6 CHRONIC RIGHT-SIDED THORACIC BACK PAIN: ICD-10-CM

## 2023-01-09 DIAGNOSIS — R09.02 HYPOXIA: ICD-10-CM

## 2023-01-09 DIAGNOSIS — G89.4 CHRONIC PAIN SYNDROME: ICD-10-CM

## 2023-01-09 DIAGNOSIS — G89.29 CHRONIC RIGHT-SIDED THORACIC BACK PAIN: ICD-10-CM

## 2023-01-09 PROBLEM — J12.82 PNEUMONIA DUE TO COVID-19 VIRUS: Status: ACTIVE | Noted: 2023-01-09

## 2023-01-09 PROBLEM — R26.2 AMBULATORY DYSFUNCTION: Status: ACTIVE | Noted: 2023-01-09

## 2023-01-09 LAB
2HR DELTA HS TROPONIN: -1 NG/L
ALBUMIN SERPL BCP-MCNC: 3.7 G/DL (ref 3.5–5)
ALP SERPL-CCNC: 58 U/L (ref 34–104)
ALT SERPL W P-5'-P-CCNC: 12 U/L (ref 7–52)
ANION GAP SERPL CALCULATED.3IONS-SCNC: 6 MMOL/L (ref 4–13)
AST SERPL W P-5'-P-CCNC: 18 U/L (ref 13–39)
ATRIAL RATE: 65 BPM
BASOPHILS # BLD AUTO: 0.05 THOUSANDS/ÂΜL (ref 0–0.1)
BASOPHILS NFR BLD AUTO: 1 % (ref 0–1)
BILIRUB SERPL-MCNC: 0.78 MG/DL (ref 0.2–1)
BNP SERPL-MCNC: 61 PG/ML (ref 0–100)
BUN SERPL-MCNC: 16 MG/DL (ref 5–25)
CALCIUM SERPL-MCNC: 8.7 MG/DL (ref 8.4–10.2)
CARDIAC TROPONIN I PNL SERPL HS: 13 NG/L
CARDIAC TROPONIN I PNL SERPL HS: 14 NG/L
CHLORIDE SERPL-SCNC: 96 MMOL/L (ref 96–108)
CO2 SERPL-SCNC: 39 MMOL/L (ref 21–32)
CREAT SERPL-MCNC: 1.18 MG/DL (ref 0.6–1.3)
D DIMER PPP FEU-MCNC: 0.45 UG/ML FEU
EOSINOPHIL # BLD AUTO: 0.35 THOUSAND/ÂΜL (ref 0–0.61)
EOSINOPHIL NFR BLD AUTO: 6 % (ref 0–6)
ERYTHROCYTE [DISTWIDTH] IN BLOOD BY AUTOMATED COUNT: 14.8 % (ref 11.6–15.1)
FLUAV RNA RESP QL NAA+PROBE: NEGATIVE
FLUBV RNA RESP QL NAA+PROBE: NEGATIVE
GFR SERPL CREATININE-BSD FRML MDRD: 58 ML/MIN/1.73SQ M
GLUCOSE SERPL-MCNC: 100 MG/DL (ref 65–140)
HCT VFR BLD AUTO: 45.9 % (ref 36.5–49.3)
HGB BLD-MCNC: 14.9 G/DL (ref 12–17)
IMM GRANULOCYTES # BLD AUTO: 0.02 THOUSAND/UL (ref 0–0.2)
IMM GRANULOCYTES NFR BLD AUTO: 0 % (ref 0–2)
LYMPHOCYTES # BLD AUTO: 1.02 THOUSANDS/ÂΜL (ref 0.6–4.47)
LYMPHOCYTES NFR BLD AUTO: 17 % (ref 14–44)
MCH RBC QN AUTO: 32.4 PG (ref 26.8–34.3)
MCHC RBC AUTO-ENTMCNC: 32.5 G/DL (ref 31.4–37.4)
MCV RBC AUTO: 100 FL (ref 82–98)
MONOCYTES # BLD AUTO: 0.86 THOUSAND/ÂΜL (ref 0.17–1.22)
MONOCYTES NFR BLD AUTO: 15 % (ref 4–12)
NEUTROPHILS # BLD AUTO: 3.62 THOUSANDS/ÂΜL (ref 1.85–7.62)
NEUTS SEG NFR BLD AUTO: 61 % (ref 43–75)
NRBC BLD AUTO-RTO: 0 /100 WBCS
P AXIS: 80 DEGREES
PLATELET # BLD AUTO: 186 THOUSANDS/UL (ref 149–390)
PMV BLD AUTO: 10.2 FL (ref 8.9–12.7)
POTASSIUM SERPL-SCNC: 2.9 MMOL/L (ref 3.5–5.3)
PR INTERVAL: 176 MS
PROCALCITONIN SERPL-MCNC: 0.12 NG/ML
PROT SERPL-MCNC: 5.7 G/DL (ref 6.4–8.4)
QRS AXIS: 69 DEGREES
QRSD INTERVAL: 100 MS
QT INTERVAL: 452 MS
QTC INTERVAL: 470 MS
RBC # BLD AUTO: 4.6 MILLION/UL (ref 3.88–5.62)
RSV RNA RESP QL NAA+PROBE: NEGATIVE
SARS-COV-2 RNA RESP QL NAA+PROBE: POSITIVE
SODIUM SERPL-SCNC: 141 MMOL/L (ref 135–147)
T WAVE AXIS: 89 DEGREES
VENTRICULAR RATE: 65 BPM
WBC # BLD AUTO: 5.92 THOUSAND/UL (ref 4.31–10.16)

## 2023-01-09 PROCEDURE — XW033E5 INTRODUCTION OF REMDESIVIR ANTI-INFECTIVE INTO PERIPHERAL VEIN, PERCUTANEOUS APPROACH, NEW TECHNOLOGY GROUP 5: ICD-10-PCS | Performed by: STUDENT IN AN ORGANIZED HEALTH CARE EDUCATION/TRAINING PROGRAM

## 2023-01-09 RX ORDER — ATORVASTATIN CALCIUM 80 MG/1
80 TABLET, FILM COATED ORAL
Status: DISCONTINUED | OUTPATIENT
Start: 2023-01-10 | End: 2023-01-11 | Stop reason: HOSPADM

## 2023-01-09 RX ORDER — METHYLPREDNISOLONE SODIUM SUCCINATE 125 MG/2ML
80 INJECTION, POWDER, LYOPHILIZED, FOR SOLUTION INTRAMUSCULAR; INTRAVENOUS ONCE
Status: COMPLETED | OUTPATIENT
Start: 2023-01-09 | End: 2023-01-09

## 2023-01-09 RX ORDER — GUAIFENESIN/DEXTROMETHORPHAN 100-10MG/5
10 SYRUP ORAL EVERY 4 HOURS PRN
Status: DISCONTINUED | OUTPATIENT
Start: 2023-01-09 | End: 2023-01-11 | Stop reason: HOSPADM

## 2023-01-09 RX ORDER — FORMOTEROL FUMARATE 20 UG/2ML
20 SOLUTION RESPIRATORY (INHALATION)
Status: DISCONTINUED | OUTPATIENT
Start: 2023-01-09 | End: 2023-01-11 | Stop reason: HOSPADM

## 2023-01-09 RX ORDER — LIDOCAINE 50 MG/G
1 PATCH TOPICAL DAILY
Status: DISCONTINUED | OUTPATIENT
Start: 2023-01-09 | End: 2023-01-11 | Stop reason: HOSPADM

## 2023-01-09 RX ORDER — DEXAMETHASONE SODIUM PHOSPHATE 4 MG/ML
6 INJECTION, SOLUTION INTRA-ARTICULAR; INTRALESIONAL; INTRAMUSCULAR; INTRAVENOUS; SOFT TISSUE EVERY 24 HOURS
Status: DISCONTINUED | OUTPATIENT
Start: 2023-01-09 | End: 2023-01-11 | Stop reason: HOSPADM

## 2023-01-09 RX ORDER — AZITHROMYCIN 250 MG/1
500 TABLET, FILM COATED ORAL 3 TIMES WEEKLY
Status: DISCONTINUED | OUTPATIENT
Start: 2023-01-09 | End: 2023-01-11 | Stop reason: HOSPADM

## 2023-01-09 RX ORDER — METOPROLOL SUCCINATE 50 MG/1
50 TABLET, EXTENDED RELEASE ORAL 2 TIMES DAILY
Status: DISCONTINUED | OUTPATIENT
Start: 2023-01-09 | End: 2023-01-10

## 2023-01-09 RX ORDER — POTASSIUM CHLORIDE 750 MG/1
20 TABLET, EXTENDED RELEASE ORAL DAILY
Status: DISCONTINUED | OUTPATIENT
Start: 2023-01-10 | End: 2023-01-11 | Stop reason: HOSPADM

## 2023-01-09 RX ORDER — FAMOTIDINE 20 MG/1
20 TABLET, FILM COATED ORAL
Status: DISCONTINUED | OUTPATIENT
Start: 2023-01-09 | End: 2023-01-11 | Stop reason: HOSPADM

## 2023-01-09 RX ORDER — ENOXAPARIN SODIUM 100 MG/ML
40 INJECTION SUBCUTANEOUS DAILY
Status: DISCONTINUED | OUTPATIENT
Start: 2023-01-09 | End: 2023-01-11 | Stop reason: HOSPADM

## 2023-01-09 RX ORDER — IPRATROPIUM BROMIDE AND ALBUTEROL SULFATE 2.5; .5 MG/3ML; MG/3ML
3 SOLUTION RESPIRATORY (INHALATION) ONCE
Status: COMPLETED | OUTPATIENT
Start: 2023-01-09 | End: 2023-01-09

## 2023-01-09 RX ORDER — BUDESONIDE 0.5 MG/2ML
0.5 INHALANT ORAL
Status: DISCONTINUED | OUTPATIENT
Start: 2023-01-09 | End: 2023-01-11 | Stop reason: HOSPADM

## 2023-01-09 RX ORDER — POTASSIUM CHLORIDE 20 MEQ/1
40 TABLET, EXTENDED RELEASE ORAL ONCE
Status: COMPLETED | OUTPATIENT
Start: 2023-01-09 | End: 2023-01-09

## 2023-01-09 RX ORDER — ALBUTEROL SULFATE 2.5 MG/3ML
2.5 SOLUTION RESPIRATORY (INHALATION) EVERY 6 HOURS PRN
Status: DISCONTINUED | OUTPATIENT
Start: 2023-01-09 | End: 2023-01-11 | Stop reason: HOSPADM

## 2023-01-09 RX ORDER — ASPIRIN 81 MG/1
81 TABLET, CHEWABLE ORAL DAILY
Status: DISCONTINUED | OUTPATIENT
Start: 2023-01-10 | End: 2023-01-11 | Stop reason: HOSPADM

## 2023-01-09 RX ORDER — FUROSEMIDE 40 MG/1
40 TABLET ORAL DAILY
Status: DISCONTINUED | OUTPATIENT
Start: 2023-01-10 | End: 2023-01-11 | Stop reason: HOSPADM

## 2023-01-09 RX ORDER — FLUTICASONE PROPIONATE 50 MCG
1 SPRAY, SUSPENSION (ML) NASAL DAILY
Status: DISCONTINUED | OUTPATIENT
Start: 2023-01-10 | End: 2023-01-11 | Stop reason: HOSPADM

## 2023-01-09 RX ORDER — GABAPENTIN 300 MG/1
600 CAPSULE ORAL 2 TIMES DAILY
Status: DISCONTINUED | OUTPATIENT
Start: 2023-01-09 | End: 2023-01-11 | Stop reason: HOSPADM

## 2023-01-09 RX ADMIN — REMDESIVIR 200 MG: 100 INJECTION, POWDER, LYOPHILIZED, FOR SOLUTION INTRAVENOUS at 14:11

## 2023-01-09 RX ADMIN — IPRATROPIUM BROMIDE AND ALBUTEROL SULFATE 3 ML: 2.5; .5 SOLUTION RESPIRATORY (INHALATION) at 10:52

## 2023-01-09 RX ADMIN — DEXAMETHASONE SODIUM PHOSPHATE 6 MG: 4 INJECTION, SOLUTION INTRA-ARTICULAR; INTRALESIONAL; INTRAMUSCULAR; INTRAVENOUS; SOFT TISSUE at 20:26

## 2023-01-09 RX ADMIN — LIDOCAINE 5% 1 PATCH: 700 PATCH TOPICAL at 14:19

## 2023-01-09 RX ADMIN — FAMOTIDINE 20 MG: 20 TABLET ORAL at 22:32

## 2023-01-09 RX ADMIN — GUAIFENESIN AND DEXTROMETHORPHAN 10 ML: 100; 10 SYRUP ORAL at 22:54

## 2023-01-09 RX ADMIN — AZITHROMYCIN MONOHYDRATE 500 MG: 250 TABLET ORAL at 22:32

## 2023-01-09 RX ADMIN — METHYLPREDNISOLONE SODIUM SUCCINATE 80 MG: 125 INJECTION, POWDER, FOR SOLUTION INTRAMUSCULAR; INTRAVENOUS at 10:52

## 2023-01-09 RX ADMIN — POTASSIUM CHLORIDE 40 MEQ: 1500 TABLET, EXTENDED RELEASE ORAL at 11:28

## 2023-01-09 NOTE — ED NOTES
Pt's dinner tray removed from room; provided more sugar for coffee; provided warm blanket per request; call bell within reach; no further needs at this time; will continue to monitor     Yolis Hanley RN  01/09/23 1920

## 2023-01-09 NOTE — ASSESSMENT & PLAN NOTE
Patient with known history of COPD chronically on 2 L   Requiring 4 L nasal cannula in ED  Found to be COVID-positive in ED    -Admit  -COVID pathway  -Decadron day 1 of 10  -Remdesivir day 1 of 5  -Daily labs  -Check procalcitonin  -Titrate oxygen as tolerated

## 2023-01-09 NOTE — ED PROVIDER NOTES
History  Chief Complaint   Patient presents with   • Shortness of Breath   • Cough   • Weakness - Generalized     69-year-old male with history of COPD, sleep apnea, congestive heart failure, coronary artery disease, hyperlipidemia, hypertension, prostate cancer presents to the emergency department for evaluation of worsening shortness of breath and low pulse ox at home  Patient was seen by his home care nurse who advised for him to go to the emergency department as his oxygen saturation was reading in the 70s  Patient is oxygen dependent on 2 L of supplemental oxygen at home  On arrival he is satting 87% on his home O2  Patient reports generalized weakness for the past few days  Slight dry cough and the sensation of a racing heart  Shortness of breath began last night  He has not taken any of his nebulizers prior to arrival   Patient reports intermittent chest pain as well, poorly localized  No chest pain at present  No measurable or subjective fevers at home  No history of blood clots  Patient takes daily aspirin  Denies nausea, vomiting, fevers, chills, headache, dizziness, abdominal pain, dysuria, hematuria, changes in bowel movements, unknown sick contacts  Patient walks with a cane at baseline and reports that last week his knees gave out on him, but no other recent leg pain or swelling  History provided by:  Patient   used: No        Prior to Admission Medications   Prescriptions Last Dose Informant Patient Reported? Taking? Blood Pressure Monitor KIT   No No   Sig: Use daily   Patient taking differently: Use 40 mg daily   Tiotropium Bromide Monohydrate (SPIRIVA RESPIMAT IN)   Yes No   Sig: Inhale every morning   Toprol XL 50 MG 24 hr tablet   No No   Sig: TAKE 1 TABLET TWICE A DAY     albuterol (2 5 mg/3 mL) 0 083 % nebulizer solution   No No   Sig: Take 3 mL (2 5 mg total) by nebulization every 6 (six) hours as needed for wheezing or shortness of breath   arformoterol (BROVANA) 15 mcg/2 mL nebulizer solution   No No   Sig: Take 2 mL (15 mcg total) by nebulization 2 (two) times a day   aspirin 81 mg chewable tablet   Yes No   Sig: CHEW ONE TABLET BY MOUTH EVERY DAY   azithromycin (ZITHROMAX) 500 MG tablet   No No   Sig: Take 1 tablet (500 mg total) by mouth 3 (three) times a week   Patient taking differently: Take 500 mg by mouth 3 (three) times a week   budesonide (PULMICORT) 0 5 mg/2 mL nebulizer solution   No No   Sig: Take 2 mL (0 5 mg total) by nebulization every 12 (twelve) hours Rinse mouth after use    famotidine (PEPCID) 20 mg tablet   Yes No   Sig: Take 20 mg by mouth daily at bedtime   fluticasone (FLONASE) 50 mcg/act nasal spray   Yes No   Sig: into each nostril as needed    furosemide (LASIX) 40 mg tablet   No No   Sig: Take 1 tablet (40 mg total) by mouth daily   Patient taking differently: Take 40 mg by mouth every morning   gabapentin (NEURONTIN) 300 mg capsule   No No   Sig: Take 1 capsule (300 mg total) by mouth 3 (three) times a day   Patient taking differently: Take 600 mg by mouth 2 (two) times a day   nitroglycerin (NITROSTAT) 0 4 mg SL tablet   Yes No   Sig: DISSOLVE ONE TABLET UNDER THE TONGUE  PRN   potassium chloride (Klor-Con) 10 mEq tablet   No No   Sig: Take 2 tablets (20 mEq total) by mouth daily   rosuvastatin (CRESTOR) 40 MG tablet   No No   Sig: TAKE 1 TABLET DAILY   Patient taking differently: Take 40 mg by mouth daily at bedtime      Facility-Administered Medications: None       Past Medical History:   Diagnosis Date   • Bilateral carotid artery stenosis    • Cancer (HCC)     skin   • Chronic diastolic heart failure (HCC)    • Chronic ischemic heart disease    • Chronic kidney disease     stage 3   • Colon polyp    • COPD (chronic obstructive pulmonary disease) (HCC)    • Coronary artery disease     hx stents, MI, PCI   • COVID 11/2021   • CPAP (continuous positive airway pressure) dependence    • Hearing loss    • History of transfusion 1995   • Hyperlipidemia    • Hypertension    • MI (myocardial infarction) ([facility] [address] ) 1995   • Myocardial infarction ([facility] [address] ) 1995   • Pneumonia    • [facility] )    • RSV (respiratory syncytial virus infection) 10/2022   • S/P carotid endarterectomy    • Shortness of breath     O2 2 l/nc PRN   • Sleep apnea    • Sleep apnea, obstructive    • Stented coronary artery        Past Surgical History:   Procedure Laterality Date   • APPENDECTOMY     • CARDIAC CATHETERIZATION  03/18/2021    left main with no significant disease, proximal LAD with 10% stenosis at the site of prior stent, left circumflex artery with minimal luminal irregularities mid RCA with 50% stenosis at site of prior stent and PL segment with distal disease supplied by collaterals from the distal circumflex with no significant CAD requiring revascularization at that time     • CATARACT EXTRACTION, BILATERAL Bilateral    • COLONOSCOPY     • CORONARY ANGIOPLASTY WITH STENT PLACEMENT  1999    RCA   • CORONARY ANGIOPLASTY WITH STENT PLACEMENT  2001    RCA   • CORONARY ANGIOPLASTY WITH STENT PLACEMENT  2003    LAD   • EYE SURGERY     • FL BX PROSTATE STRTCTC SATURATION SAMPLING IMG GID N/A 09/13/2022    Procedure: TRANSPERINEAL MRI FUSION  BIOPSY PROSTATE;  Surgeon: Oscar March MD;  Location: [facility];  Service: Urology   • FL NEUROPLASTY &/TRANSPOS MEDIAN NRV CARPAL Ginger Flack Left 07/22/2022    Procedure: RELEASE CARPAL TUNNEL;  Surgeon: Eduardo Eduardo MD;  Location: [facility];  Service: Orthopedics   • FL NEUROPLASTY &/TRANSPOSITION ULNAR NERVE ELBOW Left 07/22/2022    Procedure: RELEASE CUBITAL TUNNEL;  Surgeon: Eduardo Eduardo MD;  Location[facility];  Service: Orthopedics   • FL NEUROPLASTY &/TRANSPOSITION ULNAR NERVE WRIST Left 07/22/2022    Procedure: Harolyn Hallmark RELEASE;  Surgeon: Eduardo Eduardo MD;  Location[facility];  Service: Orthopedics   • SKIN CANCER EXCISION  2012    chin-per pt, basal cell  also right ankle       Family History Problem Relation Age of Onset   • Heart attack Mother    • Dementia Mother    • No Known Problems Father      I have reviewed and agree with the history as documented  E-Cigarette/Vaping   • E-Cigarette Use Never User      E-Cigarette/Vaping Substances   • Nicotine No    • THC No    • CBD No    • Flavoring No    • Other No    • Unknown No      Social History     Tobacco Use   • Smoking status: Former     Packs/day: 2 00     Years: 35 00     Pack years: 70 00     Types: Cigarettes     Quit date: 1995     Years since quittin 5   • Smokeless tobacco: Never   Vaping Use   • Vaping Use: Never used   Substance Use Topics   • Alcohol use: Not Currently   • Drug use: Never       Review of Systems   Constitutional: Negative for chills and fever  HENT: Negative for ear pain and sore throat  Eyes: Negative for pain and visual disturbance  Respiratory: Positive for cough and shortness of breath  Cardiovascular: Positive for chest pain  Negative for palpitations  Gastrointestinal: Negative for abdominal pain and vomiting  Genitourinary: Negative for dysuria and hematuria  Musculoskeletal: Negative for arthralgias and back pain  Skin: Negative for color change and rash  Neurological: Negative for seizures, syncope and headaches  All other systems reviewed and are negative  Physical Exam  Physical Exam  Vitals and nursing note reviewed  Constitutional:       General: He is not in acute distress  Appearance: He is well-developed  He is not ill-appearing or diaphoretic  HENT:      Head: Normocephalic and atraumatic  Right Ear: External ear normal       Left Ear: External ear normal       Mouth/Throat:      Mouth: Mucous membranes are moist       Pharynx: Oropharynx is clear  Eyes:      General: No scleral icterus  Right eye: No discharge  Left eye: No discharge        Conjunctiva/sclera: Conjunctivae normal       Pupils: Pupils are equal, round, and reactive to light  Cardiovascular:      Rate and Rhythm: Normal rate  Pulses: Normal pulses  Heart sounds: Normal heart sounds  Pulmonary:      Effort: Pulmonary effort is normal  No tachypnea, accessory muscle usage or respiratory distress  Breath sounds: Decreased breath sounds and wheezing present  Comments: Diminished breath sounds bilaterally with mild expiratory wheezing  No increased work of breathing, accessory muscle use, tachypnea  Satting 96% on 4 L of supplemental oxygen via nasal cannula  Chest:      Chest wall: No tenderness  Abdominal:      Palpations: Abdomen is soft  Tenderness: There is no abdominal tenderness  Musculoskeletal:         General: No swelling, tenderness or signs of injury  Normal range of motion  Cervical back: Normal range of motion and neck supple  No rigidity  Right lower leg: No tenderness  No edema  Left lower leg: No tenderness  No edema  Skin:     General: Skin is warm and dry  Capillary Refill: Capillary refill takes less than 2 seconds  Findings: No bruising, erythema or rash  Neurological:      General: No focal deficit present  Mental Status: He is alert and oriented to person, place, and time  Mental status is at baseline  Psychiatric:         Mood and Affect: Mood normal          Behavior: Behavior normal          Thought Content:  Thought content normal          Vital Signs  ED Triage Vitals [01/09/23 1004]   Temperature Pulse Respirations Blood Pressure SpO2   (!) 97 4 °F (36 3 °C) 67 20 117/68 (!) 87 %      Temp Source Heart Rate Source Patient Position - Orthostatic VS BP Location FiO2 (%)   Tympanic Monitor Sitting Left arm --      Pain Score       No Pain           Vitals:    01/09/23 1004 01/09/23 1130   BP: 117/68 100/54   Pulse: 67 67   Patient Position - Orthostatic VS: Sitting Sitting         Visual Acuity      ED Medications  Medications   ipratropium-albuterol (DUO-NEB) 0 5-2 5 mg/3 mL inhalation solution 3 mL (3 mL Nebulization Given 1/9/23 1052)   methylPREDNISolone sodium succinate (Solu-MEDROL) injection 80 mg (80 mg Intravenous Given 1/9/23 1052)   potassium chloride (K-DUR,KLOR-CON) CR tablet 40 mEq (40 mEq Oral Given 1/9/23 1128)       Diagnostic Studies  Results Reviewed     Procedure Component Value Units Date/Time    HS Troponin I 4hr [[id number]]     Lab Status: No result Specimen: Blood     FLU/RSV/COVID - if FLU/RSV clinically relevant [[id number]]  (Abnormal) Collected: 01/09/23 1045    Lab Status: Final result Specimen: Nares from Nasopharyngeal Swab Updated: 01/09/23 1131     SARS-CoV-2 Positive     INFLUENZA A PCR Negative     INFLUENZA B PCR Negative     RSV PCR Negative    Narrative:      FOR PEDIATRIC PATIENTS - copy/paste COVID Guidelines URL to browser: [email or web address]    SARS-CoV-2 assay is a Nucleic Acid Amplification assay intended for the  qualitative detection of nucleic acid from SARS-CoV-2 in nasopharyngeal  swabs  Results are for the presumptive identification of SARS-CoV-2 RNA  Positive results are indicative of infection with SARS-CoV-2, the virus  causing COVID-19, but do not rule out bacterial infection or co-infection  with other viruses  Laboratories within the [place] and its  territories are required to report all positive results to the appropriate  public health authorities  Negative results do not preclude SARS-CoV-2  infection and should not be used as the sole basis for treatment or other  patient management decisions  Negative results must be combined with  clinical observations, patient history, and epidemiological information  This test has not been FDA cleared or approved  This test has been authorized by FDA under an Emergency Use Authorization  (EUA)   This test is only authorized for the duration of time the  declaration that circumstances exist justifying the authorization of the  emergency use of an in vitro diagnostic tests for detection of SARS-CoV-2  virus and/or diagnosis of COVID-19 infection under section 564(b)(1) of  the Act, 21 U  S C  861JDR-6(B)(7), unless the authorization is terminated  or revoked sooner  The test has been validated but independent review by FDA  and CLIA is pending  Test performed using [employer] Gene[employer]pert: This RT-PCR assay targets N2,  a region unique to SARS-CoV-2  A conserved region in the E-gene was chosen  for pan-Sarbecovirus detection which includes SARS-CoV-2  According to CMS-2020-01-R, this platform meets the definition of high-throughput technology      HS Troponin 0hr (reflex protocol) [[id number]]  (Normal) Collected: 01/09/23 1045    Lab Status: Final result Specimen: Blood from Arm, Right Updated: 01/09/23 1118     hs TnI 0hr 14 ng/L     HS Troponin I 2hr [[id number]]     Lab Status: No result Specimen: Blood     B-Type Natriuretic Peptide(BNP) AN, CA, EA Campuses Only [[id number]]  (Normal) Collected: 01/09/23 1045    Lab Status: Final result Specimen: Blood from Arm, Right Updated: 01/09/23 1117     BNP 61 pg/mL     Comprehensive metabolic panel [[id number]]  (Abnormal) Collected: 01/09/23 1045    Lab Status: Final result Specimen: Blood from Arm, Right Updated: 01/09/23 1112     Sodium 141 mmol/L      Potassium 2 9 mmol/L      Chloride 96 mmol/L      CO2 39 mmol/L      ANION GAP 6 mmol/L      BUN 16 mg/dL      Creatinine 1 18 mg/dL      Glucose 100 mg/dL      Calcium 8 7 mg/dL      AST 18 U/L      ALT 12 U/L      Alkaline Phosphatase 58 U/L      Total Protein 5 7 g/dL      Albumin 3 7 g/dL      Total Bilirubin 0 78 mg/dL      eGFR 58 ml/min/1 73sq m     Narrative:      Ginette guidelines for Chronic Kidney Disease (CKD):   •  Stage 1 with normal or high GFR (GFR > 90 mL/min/1 73 square meters)  •  Stage 2 Mild CKD (GFR = 60-89 mL/min/1 73 square meters)  •  Stage 3A Moderate CKD (GFR = 45-59 mL/min/1 73 square meters)  •  Stage 3B Moderate CKD (GFR = 30-44 mL/min/1 73 square meters)  •  Stage 4 Severe CKD (GFR = 15-29 mL/min/1 73 square meters)  •  Stage 5 End Stage CKD (GFR <15 mL/min/1 73 square meters)  Note: GFR calculation is accurate only with a steady state creatinine    D-Dimer [[id number]]  (Normal) Collected: 01/09/23 1045    Lab Status: Final result Specimen: Blood from Arm, Right Updated: 01/09/23 1109     D-Dimer, Quant 0 45 ug/ml FEU     Narrative: In the evaluation for possible pulmonary embolism, in the appropriate (Well's Score of 4 or less) patient, the age adjusted d-dimer cutoff for this patient can be calculated as:    Age x 0 01 (in ug/mL) for Age-adjusted D-dimer exclusion threshold for a patient over 50 years      CBC and differential [[id number]]  (Abnormal) Collected: 01/09/23 1045    Lab Status: Final result Specimen: Blood from Arm, Right Updated: 01/09/23 1053     WBC 5 92 Thousand/uL      RBC 4 60 Million/uL      Hemoglobin 14 9 g/dL      Hematocrit 45 9 %       fL      MCH 32 4 pg      MCHC 32 5 g/dL      RDW 14 8 %      MPV 10 2 fL      Platelets 523 Thousands/uL      nRBC 0 /100 WBCs      Neutrophils Relative 61 %      Immat GRANS % 0 %      Lymphocytes Relative 17 %      Monocytes Relative 15 %      Eosinophils Relative 6 %      Basophils Relative 1 %      Neutrophils Absolute 3 62 Thousands/µL      Immature Grans Absolute 0 02 Thousand/uL      Lymphocytes Absolute 1 02 Thousands/µL      Monocytes Absolute 0 86 Thousand/µL      Eosinophils Absolute 0 35 Thousand/µL      Basophils Absolute 0 05 Thousands/µL                  XR chest 1 view portable   Final Result by Stevie Welch MD (01/09 1200)   Recurrent bibasal groundglass opacities suspicious for infiltrate vs  atelectasis                        Workstation performed: [id number]                    Procedures  ECG 12 Lead Documentation Only    Date/Time: 1/9/2023 10:51 AM  Performed by: Elizabeth Souza PA-C  Authorized by: Aly Esquivel PA-C     Indications / Diagnosis:  Intermittent chest pain, shortness of breath  ECG reviewed by me, the ED Provider: yes    Patient location:  ED  Interpretation:     Interpretation: normal    Rate:     ECG rate:  65    ECG rate assessment: normal    Rhythm:     Rhythm: sinus rhythm    Ectopy:     Ectopy: none    QRS:     QRS axis:  Normal    QRS intervals:  Normal  Conduction:     Conduction: normal    ST segments:     ST segments:  Normal  T waves:     T waves: normal      CriticalCare Time  Performed by: Aly Esquivel PA-C  Authorized by: Aly Esquivel PA-C     Critical care provider statement:     Critical care time (minutes):  45    Critical care was necessary to treat or prevent imminent or life-threatening deterioration of the following conditions:  Respiratory failure    Critical care was time spent personally by me on the following activities:  Obtaining history from patient or surrogate, development of treatment plan with patient or surrogate, evaluation of patient's response to treatment, examination of patient, review of old charts, re-evaluation of patient's condition, ordering and review of radiographic studies, ordering and review of laboratory studies and ordering and performing treatments and interventions             ED Course  ED Course as of 01/09/23 1217   Mon Jan 09, 2023   1028 Increased sob, racing HR since last night  Hx of copd  Satting 88% on home O2  Brought up to low 90s on 4L  +cough, intermittent chest pain (not currently)   1135 SARS-COV-2(!): Positive   1136 No improvement of expiratory wheezing after duoneb  Will plan to admit for monitoring as patient is requiring more oxygen than baseline with positive covid testing      1203 IAJT-Tyqo-Gonfr/Call AMANDA (Herb Green([facility]))  [address]  let's do inpatient  11:52 AM  20 days left             HEART Risk Score    Flowsheet Row Most Recent Value   Heart Score Risk Calculator History 0 Filed at: 01/09/2023 1212   ECG 0 Filed at: 01/09/2023 1212   Age 2 Filed at: 01/09/2023 1212   Risk Factors 2 Filed at: 01/09/2023 1212   Troponin 1 Filed at: 01/09/2023 1212   HEART Score 5 Filed at: 01/09/2023 1212                                      Medical Decision Making  65-year-old male with COPD, CHF, CAD, hypertension, hyperlipidemia, chronic O2 dependence presents to the emergency department for evaluation of worsening shortness of breath and low oxygen saturations since last night  Patient in no acute distress, nontoxic-appearing  Patient satting 85 to 87% on baseline O2 at 2 L  Addressed hypoxia and increased supplemental O2 to 4 L, which brought him to 90-95%  No acute respiratory distress, tachypnea, accessory muscle usage  Diminished breath sounds bilaterally with mild diffuse expiratory wheezing  Will treat with DuoNeb and Solu-Medrol and reassess  Differential includes COPD exacerbation, CHF exacerbation, viral illness, pneumonia, PE  Lower in the differential includes MI however story inconsistent with ACS  ECG showing normal sinus rhythm with no ST interval changes  Will check CBC, CMP, D-dimer, troponin, BNP, chest x-ray, COVID/flu/RSV testing  No leukocytosis noted on CBC  No electrolyte abnormalities  D-dimer negative, ruling out PE  BNP within normal limits, ruling out CHF exacerbation  Troponin 14  Heart score 5  Will trend troponin and ECG  Doubt ACS  Positive COVID test noted  On reevaluation, no change noted with breath sounds after nebulizer and steroids  Due to increased oxygen requirements and COVID infection, will plan to admit for further monitoring  Discussed the findings and plan with the patient who verbalizes understanding and is amenable to admission  Discussed case with Alberto Eason accepting  Patient admitted in stable condition        COPD (chronic obstructive pulmonary disease) ([facility] [address] ): chronic illness or injury  COVID: acute illness or injury  Hypoxia: acute illness or injury  Amount and/or Complexity of Data Reviewed  Labs: ordered  Decision-making details documented in ED Course  Radiology: ordered  Risk  Prescription drug management  Decision regarding hospitalization  Critical Care  Total time providing critical care: 30-74 minutes      Disposition  Final diagnoses:   COVID   Hypoxia   COPD (chronic obstructive pulmonary disease) ([facility] [address] )     Time reflects when diagnosis was documented in both MDM as applicable and the Disposition within this note     Time User Action Codes Description Comment    1/9/2023 11:56 AM Alondra Gross Add [U07 1] COVID     1/9/2023 11:56 AM Alondra Ginny Add [R09 02] Hypoxia     1/9/2023 12:07 PM Alondra Gross Add [J44 9] COPD (chronic obstructive pulmonary disease) [facility])       ED Disposition     ED Disposition   Admit    Condition   Stable    Date/Time   Mon Jan 9, 2023 12:03 PM    Comment   Case was discussed with Dr Green and the patient's admission status was agreed to be Admission Status: inpatient status to the service of Dr Yanique Carter   Follow-up Information    None         Patient's Medications   Discharge Prescriptions    No medications on file       No discharge procedures on file      PDMP Review       Value Time User    PDMP Reviewed  Yes 7/22/2022  8:46 AM Candelaria Romero PA-C          ED Provider  Electronically Signed by           Rachael Nielsen PA-C  01/09/23 3061

## 2023-01-09 NOTE — ASSESSMENT & PLAN NOTE
Wt Readings from Last 3 Encounters:   01/09/23 94 3 kg (208 lb)   12/30/22 94 3 kg (208 lb)   12/08/22 94 3 kg (208 lb)       -Continue home Toprol XL, Lasix and potassium supplementation

## 2023-01-09 NOTE — ASSESSMENT & PLAN NOTE
Lab Results   Component Value Date    EGFR 58 01/09/2023    EGFR 52 11/15/2022    EGFR 56 10/25/2022    CREATININE 1 18 01/09/2023    CREATININE 1 29 11/15/2022    CREATININE 1 22 10/25/2022       -Creatinine at baseline

## 2023-01-09 NOTE — ASSESSMENT & PLAN NOTE
Patient requiring 2 L nasal cannula at baseline  Currently requiring 4 L nasal cannula    -Plan as above

## 2023-01-09 NOTE — H&P
[address] [facility] 1945, 65 y o  male MRN: [id number]  Unit/Bed#: ED 05 Encounter: [id number]  Primary Care Provider: Jim Yanez DO   Date and time admitted to hospital: 1/9/2023 10:08 AM    * Pneumonia due to COVID-19 virus  Assessment & Plan  Patient with known history of COPD chronically on 2 L   Requiring 4 L nasal cannula in ED  Found to be COVID-positive in ED    -Admit  -COVID pathway  -Decadron day 1 of 10  -Remdesivir day 1 of 5  -Daily labs  -Check procalcitonin  -Titrate oxygen as tolerated    COPD (chronic obstructive pulmonary disease) (HCC)  Assessment & Plan  Wears 2 L nasal cannula at baseline  Does not appear to be in overt exacerbation    -Continue home medications    Ambulatory dysfunction  Assessment & Plan  - PT/OT    Acute on chronic respiratory failure with hypoxia [facility])  Assessment & Plan  Patient requiring 2 L nasal cannula at baseline  Currently requiring 4 L nasal cannula    -Plan as above    Stage 3a chronic kidney disease [facility])  Assessment & Plan  Lab Results   Component Value Date    EGFR 58 01/09/2023    EGFR 52 11/15/2022    EGFR 56 10/25/2022    CREATININE 1 18 01/09/2023    CREATININE 1 29 11/15/2022    CREATININE 1 22 10/25/2022       -Creatinine at baseline    Sleep apnea  Assessment & Plan  - Continue CPAP nightly    Gastroesophageal reflux disease  Assessment & Plan  - Continue home Pepcid    Chronic diastolic (congestive) heart failure (HCC)  Assessment & Plan  Wt Readings from Last 3 Encounters:   01/09/23 94 3 kg (208 lb)   12/30/22 94 3 kg (208 lb)   12/08/22 94 3 kg (208 lb)       -Continue home Toprol XL, Lasix and potassium supplementation      Hyperlipidemia  Assessment & Plan  - Substitute home Crestor with Lipitor    VTE Pharmacologic Prophylaxis: VTE Score: 6 Lovenox  Code Status: Level 1 - Full Code   Discussion with family: Discussed with patient all questions answered    Anticipated Length of Stay: Patient will be admitted on an inpatient basis with an anticipated length of stay of greater than 2 midnights secondary to COVID-19 pneumonia  Total Time for Visit, including Counseling / Coordination of Care: 30 minutes Greater than 50% of this total time spent on direct patient counseling and coordination of care  Chief Complaint: Shortness of breath    History of Present Illness:  Stephanie Barker is a 65 y o  male with a PMH of COPD clear on 2 L, CAD, hypertension, obstructive sleep apnea, CKD stage III, hyperlipidemia who presents with notes of breath  Presented to ED for valuation of shortness of breath and fatigue  He notices pulse ox was low at home reading into the low 80s  Has reported generalized weakness for the past few days as well as a dry cough  He has been having difficulty ambulating    Review of Systems:  Review of Systems   Constitutional: Negative  HENT: Negative  Eyes: Negative  Respiratory: Positive for cough and shortness of breath  Cardiovascular: Negative  Gastrointestinal: Negative  Genitourinary: Negative  Musculoskeletal: Negative  Skin: Negative  Neurological: Negative  Hematological: Negative  Psychiatric/Behavioral: Negative          Past Medical and Surgical History:   Past Medical History:   Diagnosis Date   • Bilateral carotid artery stenosis    • Cancer (HCC)     skin   • Chronic diastolic heart failure (HCC)    • Chronic ischemic heart disease    • Chronic kidney disease     stage 3   • Colon polyp    • COPD (chronic obstructive pulmonary disease) (HCC)    • Coronary artery disease     hx stents, MI, PCI   • COVID 11/2021   • CPAP (continuous positive airway pressure) dependence    • Hearing loss    • History of transfusion 1995   • Hyperlipidemia    • Hypertension    • MI (myocardial infarction) ([facility] [place] [address] ) 1995   • Myocardial infarction ([facility] [place] [address] ) 1995   • Pneumonia    • Prostate cancer ([facility] [place] [address] )    • RSV (respiratory syncytial virus infection) 10/2022   • S/P carotid endarterectomy    • Shortness of breath     O2 2 l/nc PRN   • Sleep apnea    • Sleep apnea, obstructive    • Stented coronary artery        Past Surgical History:   Procedure Laterality Date   • APPENDECTOMY     • CARDIAC CATHETERIZATION  03/18/2021    left main with no significant disease, proximal LAD with 10% stenosis at the site of prior stent, left circumflex artery with minimal luminal irregularities mid RCA with 50% stenosis at site of prior stent and PL segment with distal disease supplied by collaterals from the distal circumflex with no significant CAD requiring revascularization at that time  • CATARACT EXTRACTION, BILATERAL Bilateral    • COLONOSCOPY     • CORONARY ANGIOPLASTY WITH STENT PLACEMENT  1999    RCA   • CORONARY ANGIOPLASTY WITH STENT PLACEMENT  2001    RCA   • CORONARY ANGIOPLASTY WITH STENT PLACEMENT  2003    LAD   • EYE SURGERY     • CO BX PROSTATE STRTCTC SATURATION SAMPLING IMG GID N/A 09/13/2022    Procedure: TRANSPERINEAL MRI FUSION  BIOPSY PROSTATE;  Surgeon: Mikel Kay MD;  Location: [facility];  Service: Urology   • CO NEUROPLASTY &/TRANSPOS MEDIAN NRV CARPAL Dorena Solum Left 07/22/2022    Procedure: RELEASE CARPAL TUNNEL;  Surgeon: Meera Snow MD;  Location: [facility];  Service: Orthopedics   • CO NEUROPLASTY &/TRANSPOSITION ULNAR NERVE ELBOW Left 07/22/2022    Procedure: RELEASE CUBITAL TUNNEL;  Surgeon: Meera Snow MD;  Location: [facility];  Service: Orthopedics   • CO NEUROPLASTY &/TRANSPOSITION ULNAR NERVE WRIST Left 07/22/2022    Procedure: Driscilla Marquez RELEASE;  Surgeon: Meera Snow MD;  Location: [facility];  Service: Orthopedics   • SKIN CANCER EXCISION  2012    chin-per pt, basal cell  also right ankle       Meds/Allergies:  Prior to Admission medications    Medication Sig Start Date End Date Taking?  Authorizing Provider   albuterol (2 5 mg/3 mL) 0 083 % nebulizer solution Take 3 mL (2 5 mg total) by nebulization every 6 (six) hours as needed for wheezing or shortness of breath 11/25/22   Liz Lopez DO   arformoterol (BROVANA) 15 mcg/2 mL nebulizer solution Take 2 mL (15 mcg total) by nebulization 2 (two) times a day 11/25/22   Liz Lopez DO   aspirin 81 mg chewable tablet CHEW ONE TABLET BY MOUTH EVERY DAY    Historical Provider, MD   azithromycin (ZITHROMAX) 500 MG tablet Take 1 tablet (500 mg total) by mouth 3 (three) times a week  Patient taking differently: Take 500 mg by mouth 3 (three) times a week 11/4/22 2/2/23  Liz Lopez DO   Blood Pressure Monitor KIT Use daily  Patient taking differently: Use 40 mg daily 11/2/21   Gamaliel Massey DO   budesonide (PULMICORT) 0 5 mg/2 mL nebulizer solution Take 2 mL (0 5 mg total) by nebulization every 12 (twelve) hours Rinse mouth after use  11/25/22   Liz Lopez DO   famotidine (PEPCID) 20 mg tablet Take 20 mg by mouth daily at bedtime    Historical Provider, MD   fluticasone (FLONASE) 50 mcg/act nasal spray into each nostril as needed     Historical Provider, MD   furosemide (LASIX) 40 mg tablet Take 1 tablet (40 mg total) by mouth daily  Patient taking differently: Take 40 mg by mouth every morning 1/20/22   Gamaliel Massey DO   gabapentin (NEURONTIN) 300 mg capsule Take 1 capsule (300 mg total) by mouth 3 (three) times a day  Patient taking differently: Take 600 mg by mouth 2 (two) times a day 12/2/22   INOCENTE Lindsey   nitroglycerin (NITROSTAT) 0 4 mg SL tablet DISSOLVE ONE TABLET UNDER THE TONGUE  PRN    Historical Provider, MD   potassium chloride (Klor-Con) 10 mEq tablet Take 2 tablets (20 mEq total) by mouth daily 1/20/22   Gamaliel Massey DO   rosuvastatin (CRESTOR) 40 MG tablet TAKE 1 TABLET DAILY  Patient taking differently: Take 40 mg by mouth daily at bedtime 11/21/22   Gamaliel Massey DO   Tiotropium Bromide Monohydrate (SPIRIVA RESPIMAT IN) Inhale every morning    Historical Provider, MD   Toprol XL 50 MG 24 hr tablet TAKE 1 TABLET TWICE A DAY   11/25/22   Nahomi Khan DO Mony   Ascorbic Acid (vitamin C) 1000 MG tablet Take 1,000 mg by mouth daily  Patient not taking: No sig reported  10/15/22  Historical Provider, MD   methocarbamol (ROBAXIN) 500 mg tablet Take 1 tablet (500 mg total) by mouth 3 (three) times a day as needed for muscle spasms  Patient not taking: No sig reported 4/27/22 10/15/22  Shirley Webb DO     I have reviewed home medications with patient personally  Allergies: Allergies   Allergen Reactions   • Lisinopril Swelling and Cough   • Tetanus Antitoxin Anaphylaxis   • Tetanus Toxoid Anaphylaxis and Swelling       Social History:  Marital Status: /Civil Union   Occupation: Retired  Patient Pre-hospital Living Situation: Home  Patient Pre-hospital Level of Mobility: walks with cane  Patient Pre-hospital Diet Restrictions: none  Substance Use History:   Social History     Substance and Sexual Activity   Alcohol Use Not Currently     Social History     Tobacco Use   Smoking Status Former   • Packs/day: 2 00   • Years: 35 00   • Pack years: 70 00   • Types: Cigarettes   • Quit date: 1995   • Years since quittin 5   Smokeless Tobacco Never     Social History     Substance and Sexual Activity   Drug Use Never       Family History:  Family History   Problem Relation Age of Onset   • Heart attack Mother    • Dementia Mother    • No Known Problems Father        Physical Exam:     Vitals:   Blood Pressure: 100/54 (23 1130)  Pulse: 67 (23 1130)  Temperature: (!) 97 4 °F (36 3 °C) (23 1004)  Temp Source: Tympanic (23 1004)  Respirations: 18 (23 1130)  Height: 5' 8" (172 7 cm) (23 1004)  Weight - Scale: 94 3 kg (208 lb) (23 1004)  SpO2: 95 % (23 1130)    Physical Exam  HENT:      Head: Normocephalic  Cardiovascular:      Rate and Rhythm: Normal rate and regular rhythm     Pulmonary:      Effort: Pulmonary effort is normal       Comments: Coarse breath sounds throughout  4 Liters nasal cannula  Abdominal:      General: Abdomen is flat  Bowel sounds are normal       Palpations: Abdomen is soft  Musculoskeletal:         General: Normal range of motion  Neurological:      General: No focal deficit present  Mental Status: He is alert and oriented to person, place, and time  Mental status is at baseline  Psychiatric:         Mood and Affect: Mood normal          Thought Content: Thought content normal           Additional Data:     Lab Results:  Results from last 7 days   Lab Units 01/09/23  1045   WBC Thousand/uL 5 92   HEMOGLOBIN g/dL 14 9   HEMATOCRIT % 45 9   PLATELETS Thousands/uL 186   NEUTROS PCT % 61   LYMPHS PCT % 17   MONOS PCT % 15*   EOS PCT % 6     Results from last 7 days   Lab Units 01/09/23  1045   SODIUM mmol/L 141   POTASSIUM mmol/L 2 9*   CHLORIDE mmol/L 96   CO2 mmol/L 39*   BUN mg/dL 16   CREATININE mg/dL 1 18   ANION GAP mmol/L 6   CALCIUM mg/dL 8 7   ALBUMIN g/dL 3 7   TOTAL BILIRUBIN mg/dL 0 78   ALK PHOS U/L 58   ALT U/L 12   AST U/L 18   GLUCOSE RANDOM mg/dL 100                       Lines/Drains:  Invasive Devices     Peripheral Intravenous Line  Duration           Peripheral IV 01/09/23 Right;Ventral (anterior) Forearm <1 day                    Imaging: No pertinent imaging reviewed  XR chest 1 view portable   Final Result by Tanner Tripathi MD (01/09 1200)   Recurrent bibasal groundglass opacities suspicious for infiltrate vs  atelectasis                        Workstation performed: [id number]             EKG and Other Studies Reviewed on Admission:   EKG: Normal sinus rhythm    ** Please Note: This note has been constructed using a voice recognition system   **

## 2023-01-09 NOTE — TELEPHONE ENCOUNTER
Patient is calling regarding cancelling an appointment      Date/Time: 1/9/2023 @ 0840    Patient was rescheduled: YES [] NO [x]    Patient requesting call back to reschedule: YES [] NO [x]

## 2023-01-09 NOTE — ED ATTENDING ATTESTATION
1/9/2023  CHARLEY [address] [place] [address] Sidney DO, saw and evaluated the patient  I have discussed the patient with the resident/non-physician practitioner and agree with the resident's/non-physician practitioner's findings, Plan of Care, and MDM as documented in the resident's/non-physician practitioner's note, except where noted  All available labs and Radiology studies were reviewed  I was present for key portions of any procedure(s) performed by the resident/non-physician practitioner and I was immediately available to provide assistance  At this point I agree with the current assessment done in the Emergency Department  I have conducted an independent evaluation of this patient a history and physical is as follows:    ED Course         Critical Care Time  Procedures    This is a 27-year-old white male with a history of COPD, congestive heart failure, hyperlipidemia, CAD, prostate cancer, sleep apnea presents emerged department with increasing shortness of breath and a cough  Patient is normally on 2 L of nasal cannula at home secondary to COPD, tested positive for COVID-19, is fully vaccinated against COVID-19 2 with subsequent, will admit the patient to the hospital secondary to hypoxia, upon arrival he was satting 70% on his home oxygen which was 2 L  Patient increased to 4 L, breathing decreased, calcium is low at 2 9, replaced  Non toxic appearing, no lower extremity swelling, admit to [facility]  Portions of the record may have been created with voice recognition software  Occasional wrong word or "sound a like" substitutions may have occurred due to the inherent limitations of voice recognition software  Read the chart carefully and recognize, using context, where substitutions have occurred      This patient was examined during the Covid-19 pandemic, and appropriate PPE was employed as defined by OSHA to minimize exposure to the patient and to avoid spread in the event that I am an asymptomatic carrier  All efforts were made to avoid direct contact with the patient per CDC guidelines ("social distancing") unless otherwise necessary to rule out a medical emergency and/or to provide life-saving interventions  Donning and doffing of PPE was performed per recommended guidelines, and personal PPE was employed if /when institutional PPE was not readily available or was deemed to be less than the recommended as defined by OSHA

## 2023-01-09 NOTE — ASSESSMENT & PLAN NOTE
Wears 2 L nasal cannula at baseline  Does not appear to be in overt exacerbation    -Continue home medications

## 2023-01-10 LAB
ALBUMIN SERPL BCP-MCNC: 3.6 G/DL (ref 3.5–5)
ALP SERPL-CCNC: 55 U/L (ref 34–104)
ALT SERPL W P-5'-P-CCNC: 11 U/L (ref 7–52)
ANION GAP SERPL CALCULATED.3IONS-SCNC: 8 MMOL/L (ref 4–13)
AST SERPL W P-5'-P-CCNC: 17 U/L (ref 13–39)
BASOPHILS # BLD MANUAL: 0 THOUSAND/UL (ref 0–0.1)
BASOPHILS NFR MAR MANUAL: 0 % (ref 0–1)
BILIRUB SERPL-MCNC: 0.71 MG/DL (ref 0.2–1)
BUN SERPL-MCNC: 19 MG/DL (ref 5–25)
CALCIUM SERPL-MCNC: 8.3 MG/DL (ref 8.4–10.2)
CHLORIDE SERPL-SCNC: 100 MMOL/L (ref 96–108)
CO2 SERPL-SCNC: 31 MMOL/L (ref 21–32)
CREAT SERPL-MCNC: 1.02 MG/DL (ref 0.6–1.3)
EOSINOPHIL # BLD MANUAL: 0 THOUSAND/UL (ref 0–0.4)
EOSINOPHIL NFR BLD MANUAL: 0 % (ref 0–6)
ERYTHROCYTE [DISTWIDTH] IN BLOOD BY AUTOMATED COUNT: 14.7 % (ref 11.6–15.1)
GFR SERPL CREATININE-BSD FRML MDRD: 70 ML/MIN/1.73SQ M
GLUCOSE SERPL-MCNC: 187 MG/DL (ref 65–140)
HCT VFR BLD AUTO: 46.1 % (ref 36.5–49.3)
HGB BLD-MCNC: 14.6 G/DL (ref 12–17)
INR PPP: 1.08 (ref 0.84–1.19)
LG PLATELETS BLD QL SMEAR: PRESENT
LYMPHOCYTES # BLD AUTO: 0.15 THOUSAND/UL (ref 0.6–4.47)
LYMPHOCYTES # BLD AUTO: 1 % (ref 14–44)
MCH RBC QN AUTO: 31.9 PG (ref 26.8–34.3)
MCHC RBC AUTO-ENTMCNC: 31.7 G/DL (ref 31.4–37.4)
MCV RBC AUTO: 101 FL (ref 82–98)
MONOCYTES # BLD AUTO: 0.31 THOUSAND/UL (ref 0–1.22)
MONOCYTES NFR BLD: 2 % (ref 4–12)
NEUTROPHILS # BLD MANUAL: 14.98 THOUSAND/UL (ref 1.85–7.62)
NEUTS BAND NFR BLD MANUAL: 1 % (ref 0–8)
NEUTS SEG NFR BLD AUTO: 96 % (ref 43–75)
PLATELET # BLD AUTO: 206 THOUSANDS/UL (ref 149–390)
PLATELET BLD QL SMEAR: ADEQUATE
PMV BLD AUTO: 10 FL (ref 8.9–12.7)
POTASSIUM SERPL-SCNC: 3.6 MMOL/L (ref 3.5–5.3)
PROT SERPL-MCNC: 5.5 G/DL (ref 6.4–8.4)
PROTHROMBIN TIME: 14 SECONDS (ref 11.6–14.5)
RBC # BLD AUTO: 4.58 MILLION/UL (ref 3.88–5.62)
RBC MORPH BLD: NORMAL
SODIUM SERPL-SCNC: 139 MMOL/L (ref 135–147)
WBC # BLD AUTO: 15.44 THOUSAND/UL (ref 4.31–10.16)

## 2023-01-10 RX ADMIN — DEXAMETHASONE SODIUM PHOSPHATE 6 MG: 4 INJECTION, SOLUTION INTRA-ARTICULAR; INTRALESIONAL; INTRAMUSCULAR; INTRAVENOUS; SOFT TISSUE at 22:41

## 2023-01-10 RX ADMIN — FAMOTIDINE 20 MG: 20 TABLET ORAL at 22:41

## 2023-01-10 RX ADMIN — FORMOTEROL FUMARATE 20 MCG: 20 SOLUTION RESPIRATORY (INHALATION) at 21:30

## 2023-01-10 RX ADMIN — ASPIRIN 81 MG CHEWABLE TABLET 81 MG: 81 TABLET CHEWABLE at 08:04

## 2023-01-10 RX ADMIN — GABAPENTIN 300 MG: 300 CAPSULE ORAL at 17:16

## 2023-01-10 RX ADMIN — REMDESIVIR 100 MG: 100 INJECTION, POWDER, LYOPHILIZED, FOR SOLUTION INTRAVENOUS at 12:18

## 2023-01-10 RX ADMIN — BUDESONIDE 0.5 MG: 0.5 INHALANT ORAL at 08:28

## 2023-01-10 RX ADMIN — FORMOTEROL FUMARATE 20 MCG: 20 SOLUTION RESPIRATORY (INHALATION) at 08:28

## 2023-01-10 RX ADMIN — BUDESONIDE 0.5 MG: 0.5 INHALANT ORAL at 21:29

## 2023-01-10 RX ADMIN — GABAPENTIN 300 MG: 300 CAPSULE ORAL at 08:03

## 2023-01-10 RX ADMIN — POTASSIUM CHLORIDE 20 MEQ: 750 TABLET, EXTENDED RELEASE ORAL at 08:04

## 2023-01-10 RX ADMIN — FLUTICASONE PROPIONATE 1 SPRAY: 50 SPRAY, METERED NASAL at 08:04

## 2023-01-10 RX ADMIN — LIDOCAINE 5% 1 PATCH: 700 PATCH TOPICAL at 08:05

## 2023-01-10 RX ADMIN — ATORVASTATIN CALCIUM 80 MG: 80 TABLET, FILM COATED ORAL at 16:37

## 2023-01-10 RX ADMIN — IPRATROPIUM BROMIDE 1 PUFF: 17 AEROSOL, METERED RESPIRATORY (INHALATION) at 08:04

## 2023-01-10 RX ADMIN — FUROSEMIDE 40 MG: 40 TABLET ORAL at 08:04

## 2023-01-10 NOTE — UTILIZATION REVIEW
Initial Clinical Review    Admission: Date/Time/Statement:   Admission Orders (From admission, onward)     Ordered        01/09/23 1203  [address]  Once                      Orders Placed This Encounter   Procedures   • INPATIENT ADMISSION     Standing Status:   Standing     Number of Occurrences:   1     Order Specific Question:   Level of Care     Answer:   Med Surg [16]     Order Specific Question:   Estimated length of stay     Answer:   More than 2 Midnights     Order Specific Question:   Certification     Answer:   I certify that inpatient services are medically necessary for this patient for a duration of greater than two midnights  See H&P and MD Progress Notes for additional information about the patient's course of treatment  ED Arrival Information     Expected   -    Arrival   1/9/2023 09:56    Acuity   Emergent            Means of arrival   Walk-In    Escorted by   Family Member    Service   Hospitalist    Admission type   Emergency            Arrival complaint   chest pain,sob, low pulse           Chief Complaint   Patient presents with   • Shortness of Breath   • Cough   • Weakness - Generalized       Initial Presentation: 65 y o  male to the ED from home with complaints of shortness of breath, cough, generalized weakness  Admitted to inpatient for pneumonia due to COVID 19, COPD  H/O COPD clear on 2 L, CAD, hypertension, obstructive sleep apnea, CKD stage III, hyperlipidemia  He arrives with pulse ox 87% on [facility] which he is chronically on  Tachypneic  Decreased breath sounds with wheezing  Pulse ox increased to 4LNC  Found to be COVID + in the ED  Started on IV decadron, IV remdesivir  Check procal, titrate oxygen as tolerated  Calcium 2 9  Replaced, recheck  Date: 1/10   Day 2:   Currently requiring 4LNC  Continue with decadron, Remdesivir  Coarse breath sounds heard throughout       ED Triage Vitals [01/09/23 1004]   Temperature Pulse Respirations Blood Pressure SpO2   (!) 97 4 °F (36 3 °C) 67 20 117/68 (!) 87 %      Temp Source Heart Rate Source Patient Position - Orthostatic VS BP Location FiO2 (%)   Tympanic Monitor Sitting Left arm --      Pain Score       No Pain          Wt Readings from Last 1 Encounters:   01/09/23 94 3 kg (208 lb)     Additional Vital Signs:   Date/Time Temp Pulse Resp BP MAP (mmHg) SpO2 Calculated FIO2 (%) - Nasal Cannula Nasal Cannula O2 Flow Rate (L/min) O2 Device O2 Interface Device Patient Position - Orthostatic VS   01/10/23 0828 -- -- -- -- -- 94 % 36 4 L/min Nasal cannula -- --   01/10/23 0759 97 8 °F (36 6 °C) 73 20 132/60 86 92 % 36 4 L/min Nasal cannula -- Lying   01/10/23 0630 -- 85 -- -- -- 91 % 36 4 L/min Nasal cannula -- --   01/10/23 0430 -- 86 -- -- 86 92 % -- -- -- -- --   01/10/23 0429 -- -- -- 110/60 -- -- 36 4 L/min Nasal cannula -- Lying   01/10/23 0400 -- 78 -- -- -- 93 % -- -- -- -- --   01/10/23 0130 -- 78 -- -- -- 93 % -- -- -- -- --   01/10/23 0000 -- 81 -- -- -- 90 % -- -- -- -- --   01/09/23 2340 -- -- -- -- -- -- -- -- -- Full face mask --   01/09/23 2200 -- 91 18 115/57 -- 91 % 36 4 L/min Nasal cannula -- Lying   01/09/23 1400 -- 70 18 131/62 89 94 % 36 4 L/min None (Room air) -- Lying   01/09/23 1130 -- 67 18 100/54 -- 95 % 36 4 L/min Nasal cannula -- Sitting   01/09/23 1004 97 4 °F (36 3 °C) Abnormal  67 20 117/68 -- 87 % Abnormal  28 2 L/min Nasal cannula -- Sitting       Pertinent Labs/Diagnostic Test Results:   1/9 EKG: Normal sinus rhythm  Nonspecific ST and T wave abnormality  Abnormal ECG  When compared with ECG of 19-OCT-2022 14:34,  Minor changes  XR chest 1 view portable   Final Result by Kaelyn Feliciano MD (01/09 1200)   Recurrent bibasal groundglass opacities suspicious for infiltrate vs  atelectasis                        Workstation performed: [id number]           Results from last 7 days   Lab Units 01/09/23  1045   SARS-COV-2  Positive*     Results from last 7 days   Lab Units 01/10/23  0427 01/09/23  1045   WBC Thousand/uL 15 44* 5 92   HEMOGLOBIN g/dL 14 6 14 9   HEMATOCRIT % 46 1 45 9   PLATELETS Thousands/uL 206 186   NEUTROS ABS Thousands/µL  --  3 62   BANDS PCT % 1  --      Results from last 7 days   Lab Units 01/10/23  0427 01/09/23  1045   SODIUM mmol/L 139 141   POTASSIUM mmol/L 3 6 2 9*   CHLORIDE mmol/L 100 96   CO2 mmol/L 31 39*   ANION GAP mmol/L 8 6   BUN mg/dL 19 16   CREATININE mg/dL 1 02 1 18   EGFR ml/min/1 73sq m 70 58   CALCIUM mg/dL 8 3* 8 7     Results from last 7 days   Lab Units 01/10/23  0427 01/09/23  1045   AST U/L 17 18   ALT U/L 11 12   ALK PHOS U/L 55 58   TOTAL PROTEIN g/dL 5 5* 5 7*   ALBUMIN g/dL 3 6 3 7   TOTAL BILIRUBIN mg/dL 0 71 0 78         Results from last 7 days   Lab Units 01/10/23  0427 01/09/23  1045   GLUCOSE RANDOM mg/dL 187* 100     Results from last 7 days   Lab Units 01/09/23  1423 01/09/23  1045   HS TNI 0HR ng/L  --  14   HS TNI 2HR ng/L 13  --    HSTNI D2 ng/L -1  --      Results from last 7 days   Lab Units 01/09/23  1045   D-DIMER QUANTITATIVE ug/ml FEU 0 45     Results from last 7 days   Lab Units 01/10/23  0427   PROTIME seconds 14 0   INR  1 08         Results from last 7 days   Lab Units 01/09/23  1045   PROCALCITONIN ng/ml 0 12       Results from last 7 days   Lab Units 01/09/23  1045   BNP pg/mL 61         Results from last 7 days   Lab Units 01/09/23  1045   INFLUENZA A PCR  Negative   INFLUENZA B PCR  Negative   RSV PCR  Negative     ED Treatment:   Medication Administration from 01/09/2023 0956 to 01/10/2023 5967       Date/Time Order Dose Route Action Comments     01/09/2023 1052 EST ipratropium-albuterol (DUO-NEB) 0 5-2 5 mg/3 mL inhalation solution 3 mL 3 mL Nebulization Given --     01/09/2023 1052 EST methylPREDNISolone sodium succinate (Solu-MEDROL) injection 80 mg 80 mg Intravenous Given --     01/09/2023 1128 EST potassium chloride (K-DUR,KLOR-CON) CR tablet 40 mEq 40 mEq Oral Given --     01/09/2023 2232 EST azithromycin (ZITHROMAX) tablet 500 mg 500 mg Oral Given --     01/10/2023 0828 EST formoterol (PERFOROMIST) nebulizer solution 20 mcg 20 mcg Nebulization Given --     01/10/2023 0804 EST aspirin chewable tablet 81 mg 81 mg Oral Given --     01/10/2023 0804 EST furosemide (LASIX) tablet 40 mg 40 mg Oral Given --     01/09/2023 2232 EST famotidine (PEPCID) tablet 20 mg 20 mg Oral Given --     01/10/2023 0828 EST budesonide (PULMICORT) inhalation solution 0 5 mg 0 5 mg Nebulization Given --     01/10/2023 0804 EST fluticasone (FLONASE) 50 mcg/act nasal spray 1 spray 1 spray Each Nare Given --     01/10/2023 0804 EST potassium chloride (K-DUR,KLOR-CON) CR tablet 20 mEq 20 mEq Oral Given --     01/10/2023 0803 EST gabapentin (NEURONTIN) capsule 600 mg 300 mg Oral Given --     01/10/2023 0804 EST ipratropium (ATROVENT HFA) inhaler 1 puff 1 puff Inhalation Given --     01/09/2023 1411 EST remdesivir (Veklury) 200 mg in sodium chloride 0 9 % 290 mL IVPB 200 mg Intravenous Given --     01/09/2023 2026 EST dexamethasone (DECADRON) injection 6 mg 6 mg Intravenous Given --     01/10/2023 0805 EST lidocaine (LIDODERM) 5 % patch 1 patch 1 patch Topical Medication Applied --     01/10/2023 0121 EST lidocaine (LIDODERM) 5 % patch 1 patch 1 patch Topical Patch Removed --     01/09/2023 1419 EST lidocaine (LIDODERM) 5 % patch 1 patch 1 patch Topical Medication Applied neck     01/09/2023 2254 EST dextromethorphan-guaiFENesin (ROBITUSSIN DM) oral syrup 10 mL 10 mL Oral Given --        Past Medical History:   Diagnosis Date   • Bilateral carotid artery stenosis    • Cancer (HCC)     skin   • Chronic diastolic heart failure (HCC)    • Chronic ischemic heart disease    • Chronic kidney disease     stage 3   • Colon polyp    • COPD (chronic obstructive pulmonary disease) ([facility] [address] )    • Coronary artery disease     hx stents, MI, PCI   • COVID 11/2021   • CPAP (continuous positive airway pressure) dependence    • Hearing loss    • History of transfusion 1995   • Hyperlipidemia    • Hypertension    • MI (myocardial infarction) ([place] [address] ) 1995   • Myocardial infarction ([place] [address] ) 1995   • Pneumonia    • Prostate cancer ([place] [address] )    • RSV (respiratory syncytial virus infection) 10/2022   • S/P carotid endarterectomy    • Shortness of breath     O2 2 l/nc PRN   • Sleep apnea    • Sleep apnea, obstructive    • Stented coronary artery          Admitting Diagnosis: SOB (shortness of breath) [R06 02]  Weakness [R53 1]  Age/Sex: 65 y o  male  Admission Orders:  Scheduled Medications:  aspirin, 81 mg, Oral, Daily  atorvastatin, 80 mg, Oral, Daily With Dinner  azithromycin, 500 mg, Oral, Once per day on Mon Wed Fri  budesonide, 0 5 mg, Nebulization, Q12H  dexamethasone, 6 mg, Intravenous, Q24H  enoxaparin, 40 mg, Subcutaneous, Daily  famotidine, 20 mg, Oral, HS  fluticasone, 1 spray, Each Nare, Daily  formoterol, 20 mcg, Nebulization, Q12H  furosemide, 40 mg, Oral, Daily  gabapentin, 600 mg, Oral, BID  ipratropium, 1 puff, Inhalation, QAM  lidocaine, 1 patch, Topical, Daily  metoprolol succinate, 50 mg, Oral, BID  potassium chloride, 20 mEq, Oral, Daily  remdesivir, 100 mg, Intravenous, Q24H      Continuous IV Infusions:     PRN Meds:  albuterol, 2 5 mg, Nebulization, Q6H PRN  dextromethorphan-guaiFENesin, 10 mL, Oral, Q4H PRN        Network Utilization Review Department  ATTENTION: Please call with any questions or concerns to [phone] and carefully listen to the prompts so that you are directed to the right person  All voicemails are confidential   [facility] all requests for admission clinical reviews, approved or denied determinations and any other requests to dedicated fax number below belonging to the campus where the patient is receiving treatment  List of dedicated fax numbers for the Facilities:  [address] [place] [address] DENIALS (Administrative/Medical Necessity) [phone]   [address] (Maternity/NICU/Pediatrics) [phone]   [address] [phone]     [address] [place] [address] [phone]   [address] [place] [address] Myrna  [facility] [address] [place] [address] 310 [facility] 134 589 [facility] [phone]

## 2023-01-10 NOTE — PROGRESS NOTES
[address]  Progress Note - Mario Burns 1945, 65 y o  male MRN: [id number]  Unit/Bed#: ED 05 Encounter: [id number]  Primary Care Provider: Ana Maier DO   Date and time admitted to hospital: 1/9/2023 10:08 AM    * Pneumonia due to COVID-19 virus  Assessment & Plan  Patient with known history of COPD chronically on 2 L   Currently requiring 4 L nasal cannula  Found to be COVID-positive in ED    -COVID pathway  -Decadron day 2 of 10  -Remdesivir day 2 of 5  -Daily labs  -Titrate oxygen as tolerated    COPD (chronic obstructive pulmonary disease) ([facility] [address] )  Assessment & Plan  Wears 2 L nasal cannula at baseline  Does not appear to be in overt exacerbation    -Continue home medications    Ambulatory dysfunction  Assessment & Plan  - PT/OT    Stage 3a chronic kidney disease [facility])  Assessment & Plan  Lab Results   Component Value Date    EGFR 70 01/10/2023    EGFR 58 01/09/2023    EGFR 52 11/15/2022    CREATININE 1 02 01/10/2023    CREATININE 1 18 01/09/2023    CREATININE 1 29 11/15/2022       -Creatinine at baseline    Sleep apnea  Assessment & Plan  - Continue CPAP nightly    Gastroesophageal reflux disease  Assessment & Plan  - Continue home Pepcid    Chronic diastolic (congestive) heart failure (HCC)  Assessment & Plan  Wt Readings from Last 3 Encounters:   01/09/23 94 3 kg (208 lb)   12/30/22 94 3 kg (208 lb)   12/08/22 94 3 kg (208 lb)       -Continue home Lasix and potassium supplementation  -As per patient, metoprolol was recently discontinued by PCP      Hyperlipidemia  Assessment & Plan  - Substitute home Crestor with Lipitor          VTE Pharmacologic Prophylaxis: VTE Score: 6 Lovenox    Patient Centered Rounds: I performed bedside rounds with nursing staff today  Discussions with Specialists or Other Care Team Provider: none    Education and Discussions with Family / Patient: Discussed with patient all questions answered    Time Spent for Care: 45 minutes   More than 50% of total time spent on counseling and coordination of care as described above  Current Length of Stay: 1 day(s)  Current Patient Status: Inpatient   Certification Statement: The patient will continue to require additional inpatient hospital stay due to COVID-19 pneumonia requiring administration of IV medications  Discharge Plan: Pending clinical course    Code Status: Level 1 - Full Code    Subjective:   Patient seen and examined bedside  Patient resting in bed no apparent distress  Patient states he continues feel short of breath but feels fatigued today as compared yesterday    Objective:     Vitals:   Temp (24hrs), Av 8 °F (36 6 °C), Min:97 8 °F (36 6 °C), Max:97 8 °F (36 6 °C)    Temp:  [97 8 °F (36 6 °C)] 97 8 °F (36 6 °C)  HR:  [67-91] 73  Resp:  [18-20] 20  BP: (100-132)/(54-62) 132/60  SpO2:  [90 %-95 %] 94 %  Body mass index is 31 63 kg/m²  Input and Output Summary (last 24 hours): Intake/Output Summary (Last 24 hours) at 1/10/2023 1040  Last data filed at 1/10/2023 0849  Gross per 24 hour   Intake 360 ml   Output --   Net 360 ml       Physical Exam:   Physical Exam  Constitutional:       Appearance: He is obese  HENT:      Head: Normocephalic  Cardiovascular:      Rate and Rhythm: Normal rate and regular rhythm  Pulses: Normal pulses  Heart sounds: Normal heart sounds  Pulmonary:      Effort: Pulmonary effort is normal       Comments: Coarse breath sounds throughout  Abdominal:      General: Abdomen is flat  Bowel sounds are normal       Palpations: Abdomen is soft  Musculoskeletal:         General: Normal range of motion  Skin:     General: Skin is warm and dry  Neurological:      General: No focal deficit present  Mental Status: He is alert and oriented to person, place, and time  Mental status is at baseline     Psychiatric:         Mood and Affect: Mood normal           Additional Data:     Labs:  Results from last 7 days   Lab Units 01/10/23  2761 01/09/23  1045   WBC Thousand/uL 15 44* 5 92   HEMOGLOBIN g/dL 14 6 14 9   HEMATOCRIT % 46 1 45 9   PLATELETS Thousands/uL 206 186   BANDS PCT % 1  --    NEUTROS PCT %  --  61   LYMPHS PCT %  --  17   LYMPHO PCT % 1*  --    MONOS PCT %  --  15*   MONO PCT % 2*  --    EOS PCT % 0 6     Results from last 7 days   Lab Units 01/10/23  0427   SODIUM mmol/L 139   POTASSIUM mmol/L 3 6   CHLORIDE mmol/L 100   CO2 mmol/L 31   BUN mg/dL 19   CREATININE mg/dL 1 02   ANION GAP mmol/L 8   CALCIUM mg/dL 8 3*   ALBUMIN g/dL 3 6   TOTAL BILIRUBIN mg/dL 0 71   ALK PHOS U/L 55   ALT U/L 11   AST U/L 17   GLUCOSE RANDOM mg/dL 187*     Results from last 7 days   Lab Units 01/10/23  0427   INR  1 08             Results from last 7 days   Lab Units 01/09/23  1045   PROCALCITONIN ng/ml 0 12       Lines/Drains:  Invasive Devices     Peripheral Intravenous Line  Duration           Peripheral IV 01/09/23 Right;Ventral (anterior) Forearm <1 day                      Imaging: No pertinent imaging reviewed      Recent Cultures (last 7 days):         Last 24 Hours Medication List:   Current Facility-Administered Medications   Medication Dose Route Frequency Provider Last Rate   • albuterol  2 5 mg Nebulization Q6H PRN Dena Ng DO     • aspirin  81 mg Oral Daily Dena Ng DO     • atorvastatin  80 mg Oral Daily With Florentino Scales, DO     • azithromycin  500 mg Oral Once per day on Mon Wed Fri Dena Ng DO     • budesonide  0 5 mg Nebulization Q12H Dena Ng DO     • dexamethasone  6 mg Intravenous Q24H Dena Ng DO     • dextromethorphan-guaiFENesin  10 mL Oral Q4H PRN Irma Garner PA-C     • enoxaparin  40 mg Subcutaneous Daily Dena Ng DO     • famotidine  20 mg Oral HS Dena Ng DO     • fluticasone  1 spray Each Nare Daily Dena Ng DO     • formoterol  20 mcg Nebulization Q12H Dena Ng DO     • furosemide  40 mg Oral Daily Dena Ng, DO     • gabapentin  600 mg Oral BID José Manuel Mederos DO     • ipratropium  1 puff Inhalation QAM José Manuel Mederos DO     • lidocaine  1 patch Topical Daily José Manuel Mederos DO     • potassium chloride  20 mEq Oral Daily José Manuel Mederos DO     • remdesivir  100 mg Intravenous Q24H José Manuel Mederos DO          Today, Patient Was Seen By: José Manuel Mederos DO    **Please Note: This note may have been constructed using a voice recognition system  **

## 2023-01-10 NOTE — ASSESSMENT & PLAN NOTE
Lab Results   Component Value Date    EGFR 70 01/10/2023    EGFR 58 01/09/2023    EGFR 52 11/15/2022    CREATININE 1 02 01/10/2023    CREATININE 1 18 01/09/2023    CREATININE 1 29 11/15/2022       -Creatinine at baseline

## 2023-01-10 NOTE — ED NOTES
Patient refusing metoprolol at this time stating family pcp d/c last week at visit  Reports only taking 300mg of gabapentin         [facility]  01/10/23 4012

## 2023-01-10 NOTE — PLAN OF CARE
Problem: PAIN - ADULT  Goal: Verbalizes/displays adequate comfort level or baseline comfort level  Description: Interventions:  - Encourage patient to monitor pain and request assistance  - Assess pain using appropriate pain scale  - Administer analgesics based on type and severity of pain and evaluate response  - Implement non-pharmacological measures as appropriate and evaluate response  - Consider cultural and social influences on pain and pain management  - Notify physician/advanced practitioner if interventions unsuccessful or patient reports new pain  Outcome: Progressing       Problem: INFECTION - ADULT  Goal: Absence of fever/infection during neutropenic period  Description: INTERVENTIONS:  - Monitor WBC    Outcome: Progressing       Problem: SAFETY ADULT  Goal: Patient will remain free of falls  Description: INTERVENTIONS:  - Educate patient/family on patient safety including physical limitations  - Instruct patient to call for assistance with activity   - Consult OT/PT to assist with strengthening/mobility   - Keep Call bell within reach  - Keep bed low and locked with side rails adjusted as appropriate  - Keep care items and personal belongings within reach  - Initiate and maintain comfort rounds  - Make Fall Risk Sign visible to staff  - Apply yellow socks and bracelet for high fall risk patients  - Consider moving patient to room near nurses station  Outcome: Progressing       Problem: Prexisting or High Potential for Compromised Skin Integrity  Goal: Skin integrity is maintained or improved  Description: INTERVENTIONS:  - Identify patients at risk for skin breakdown  - Assess and monitor skin integrity  - Assess and monitor nutrition and hydration status  - Monitor labs   - Assess for incontinence   - Turn and reposition patient  - Assist with mobility/ambulation  - Relieve pressure over bony prominences  - Avoid friction and shearing  - Provide appropriate hygiene as needed including keeping skin clean and dry  - Evaluate need for skin moisturizer/barrier cream  - Collaborate with interdisciplinary team   - Patient/family teaching  - Consider wound care consult   Outcome: Progressing     Problem: DISCHARGE PLANNING  Goal: Discharge to home or other facility with appropriate resources  Description: INTERVENTIONS:  - Identify barriers to discharge w/patient and caregiver  - Arrange for needed discharge resources and transportation as appropriate  - Identify discharge learning needs (meds, wound care, etc )  - Arrange for interpretive services to assist at discharge as needed  - Refer to Case Management Department for coordinating discharge planning if the patient needs post-hospital services based on physician/advanced practitioner order or complex needs related to functional status, cognitive ability, or social support system  Outcome: Progressing       Problem: Knowledge Deficit  Goal: Patient/family/caregiver demonstrates understanding of disease process, treatment plan, medications, and discharge instructions  Description: Complete learning assessment and assess knowledge base    Interventions:  - Provide teaching at level of understanding  - Provide teaching via preferred learning methods  Outcome: Progressing

## 2023-01-10 NOTE — ASSESSMENT & PLAN NOTE
Patient with known history of COPD chronically on 2 L   Currently requiring 4 L nasal cannula  Found to be COVID-positive in ED    -COVID pathway  -Decadron day 2 of 10  -Remdesivir day 2 of 5  -Daily labs  -Titrate oxygen as tolerated

## 2023-01-10 NOTE — ED NOTES
Patient given hospital bed for comfort at this time  Patient denies complaints, denies shortness of breath  VSS, 94% on 4LNC  Will continue to monitor       Michelle Downs RN  01/09/23 2027

## 2023-01-11 ENCOUNTER — HOME HEALTH ADMISSION (OUTPATIENT)
Dept: HOME HEALTH SERVICES | Facility: HOME HEALTHCARE | Age: 78
End: 2023-01-11

## 2023-01-11 VITALS
BODY MASS INDEX: 31.52 KG/M2 | RESPIRATION RATE: 20 BRPM | OXYGEN SATURATION: 92 % | SYSTOLIC BLOOD PRESSURE: 136 MMHG | HEART RATE: 77 BPM | DIASTOLIC BLOOD PRESSURE: 67 MMHG | TEMPERATURE: 96 F | WEIGHT: 208 LBS | HEIGHT: 68 IN

## 2023-01-11 PROBLEM — E87.6 HYPOKALEMIA: Status: ACTIVE | Noted: 2023-01-11

## 2023-01-11 LAB
ALBUMIN SERPL BCP-MCNC: 3.5 G/DL (ref 3.5–5)
ALP SERPL-CCNC: 54 U/L (ref 34–104)
ALT SERPL W P-5'-P-CCNC: 12 U/L (ref 7–52)
ANION GAP SERPL CALCULATED.3IONS-SCNC: 6 MMOL/L (ref 4–13)
AST SERPL W P-5'-P-CCNC: 18 U/L (ref 13–39)
BILIRUB SERPL-MCNC: 0.48 MG/DL (ref 0.2–1)
BUN SERPL-MCNC: 19 MG/DL (ref 5–25)
CALCIUM SERPL-MCNC: 8.3 MG/DL (ref 8.4–10.2)
CHLORIDE SERPL-SCNC: 101 MMOL/L (ref 96–108)
CO2 SERPL-SCNC: 32 MMOL/L (ref 21–32)
CREAT SERPL-MCNC: 1.05 MG/DL (ref 0.6–1.3)
CRP SERPL QL: 1.4 MG/L
ERYTHROCYTE [DISTWIDTH] IN BLOOD BY AUTOMATED COUNT: 14.8 % (ref 11.6–15.1)
GFR SERPL CREATININE-BSD FRML MDRD: 67 ML/MIN/1.73SQ M
GLUCOSE SERPL-MCNC: 151 MG/DL (ref 65–140)
HCT VFR BLD AUTO: 44.4 % (ref 36.5–49.3)
HGB BLD-MCNC: 14.4 G/DL (ref 12–17)
MAGNESIUM SERPL-MCNC: 2.3 MG/DL (ref 1.9–2.7)
MCH RBC QN AUTO: 32.3 PG (ref 26.8–34.3)
MCHC RBC AUTO-ENTMCNC: 32.4 G/DL (ref 31.4–37.4)
MCV RBC AUTO: 100 FL (ref 82–98)
PLATELET # BLD AUTO: 200 THOUSANDS/UL (ref 149–390)
PMV BLD AUTO: 10.2 FL (ref 8.9–12.7)
POTASSIUM SERPL-SCNC: 3.9 MMOL/L (ref 3.5–5.3)
PROT SERPL-MCNC: 5.3 G/DL (ref 6.4–8.4)
RBC # BLD AUTO: 4.46 MILLION/UL (ref 3.88–5.62)
SODIUM SERPL-SCNC: 139 MMOL/L (ref 135–147)
WBC # BLD AUTO: 14.25 THOUSAND/UL (ref 4.31–10.16)

## 2023-01-11 RX ORDER — GABAPENTIN 300 MG/1
600 CAPSULE ORAL 2 TIMES DAILY
Qty: 60 CAPSULE | Refills: 0 | Status: SHIPPED | OUTPATIENT
Start: 2023-01-11

## 2023-01-11 RX ADMIN — ENOXAPARIN SODIUM 40 MG: 100 INJECTION SUBCUTANEOUS at 09:17

## 2023-01-11 RX ADMIN — POTASSIUM CHLORIDE 20 MEQ: 750 TABLET, EXTENDED RELEASE ORAL at 09:17

## 2023-01-11 RX ADMIN — FLUTICASONE PROPIONATE 1 SPRAY: 50 SPRAY, METERED NASAL at 09:18

## 2023-01-11 RX ADMIN — GABAPENTIN 600 MG: 300 CAPSULE ORAL at 09:17

## 2023-01-11 RX ADMIN — FUROSEMIDE 40 MG: 40 TABLET ORAL at 09:17

## 2023-01-11 RX ADMIN — IPRATROPIUM BROMIDE 1 PUFF: 17 AEROSOL, METERED RESPIRATORY (INHALATION) at 09:18

## 2023-01-11 RX ADMIN — LIDOCAINE 5% 1 PATCH: 700 PATCH TOPICAL at 09:17

## 2023-01-11 RX ADMIN — ASPIRIN 81 MG CHEWABLE TABLET 81 MG: 81 TABLET CHEWABLE at 09:17

## 2023-01-11 RX ADMIN — AZITHROMYCIN MONOHYDRATE 500 MG: 250 TABLET ORAL at 09:17

## 2023-01-11 RX ADMIN — BUDESONIDE 0.5 MG: 0.5 INHALANT ORAL at 07:37

## 2023-01-11 RX ADMIN — FORMOTEROL FUMARATE 20 MCG: 20 SOLUTION RESPIRATORY (INHALATION) at 07:37

## 2023-01-11 NOTE — PHYSICAL THERAPY NOTE
Physical Therapy Evaluation     Patient's Name: Candelaria Martinez    Admitting Diagnosis  COPD (chronic obstructive pulmonary disease) ([facility] ) [J44 9]  Weakness [R53 1]  SOB (shortness of breath) [R06 02]  Hypoxia [R09 02]  COVID [U07 1]    Problem List  Patient Active Problem List   Diagnosis    Hyperlipidemia    Coronary artery disease involving native coronary artery of native heart without angina pectoris    Bilateral carotid artery stenosis    Hypertension    Chronic obstructive pulmonary disease with acute exacerbation (HCC)    Oxygen dependent    Depression, recurrent (HCC)    S/P carotid endarterectomy    Stented coronary artery    Vertigo    Anisometropia    Osteoarthritis of spinal facet joint    Chronic ischemic heart disease    Chronic diastolic (congestive) heart failure (HCC)    Diverticular disease of colon    Dry eye syndrome    Gastroesophageal reflux disease    Hearing loss    Elevated PSA    Hypoxia    Lens replaced by other means    Lumbar radiculopathy    Multinodular goiter    Nuclear senile cataract    Obesity    Occlusion and stenosis of carotid artery    Osteoarthritis of hip    Prediabetes    Psychosexual dysfunction with inhibited sexual excitement    Sleep apnea    Solitary pulmonary nodule    Thyroid nodule    Guyon syndrome, left    Costochondral chest pain    Intermittent chest pain    Abnormal nuclear stress test    Right hip pain    Primary insomnia    Chronic right-sided thoracic back pain    Stage 3a chronic kidney disease (HCC)    Hoarseness or changing voice    Chronic bilateral low back pain with right-sided sciatica    Mid back pain    Thoracic radiculopathy    Chronic pain syndrome    Urinary frequency    Incomplete bladder emptying    Intercostal neuralgia    Cubital tunnel syndrome on left    Carpal tunnel syndrome on left    Elevated MCV    [facility] )    Acute on chronic respiratory failure with hypoxia (HCC)    Obstructive sleep apnea syndrome in adult Sensorineural hearing loss, bilateral    RSV (respiratory syncytial virus infection)    Right lower quadrant abdominal pain    Pneumonia due to COVID-19 virus    Ambulatory dysfunction    COPD (chronic obstructive pulmonary disease) ([facility])    Hypokalemia       Past Medical History  Past Medical History:   Diagnosis Date    Bilateral carotid artery stenosis     Cancer (HCC)     skin    Chronic diastolic heart failure (HCC)     Chronic ischemic heart disease     Chronic kidney disease     stage 3    Colon polyp     COPD (chronic obstructive pulmonary disease) (HCC)     Coronary artery disease     hx stents, MI, PCI    COVID 11/2021    CPAP (continuous positive airway pressure) dependence     Hearing loss     History of transfusion 1995    Hyperlipidemia     Hypertension     MI (myocardial infarction) ([facility] ) 1995    [facility]    Pneumonia     Prostate cancer ([email or web address] )     RSV (respiratory syncytial virus infection) 10/2022    S/P carotid endarterectomy     Shortness of breath     O2 2 l/nc PRN    Sleep apnea     Sleep apnea, obstructive     Stented coronary artery        Past Surgical History  Past Surgical History:   Procedure Laterality Date    APPENDECTOMY      CARDIAC CATHETERIZATION  03/18/2021    left main with no significant disease, proximal LAD with 10% stenosis at the site of prior stent, left circumflex artery with minimal luminal irregularities mid RCA with 50% stenosis at site of prior stent and PL segment with distal disease supplied by collaterals from the distal circumflex with no significant CAD requiring revascularization at that time      CATARACT EXTRACTION, BILATERAL Bilateral     COLONOSCOPY      CORONARY ANGIOPLASTY WITH STENT PLACEMENT  1999    RCA    CORONARY ANGIOPLASTY WITH STENT PLACEMENT  2001    RCA    CORONARY ANGIOPLASTY WITH STENT PLACEMENT  2003    LAD    EYE SURGERY      [facility] BX PROSTATE STRTCTC SATURATION SAMPLING IMG GID N/A 09/13/2022    Procedure: TRANSPERINEAL MRI FUSION  BIOPSY PROSTATE;  Surgeon: Enrique Christy MD;  Location: [facility];  Service: Urology    NV NEUROPLASTY &/TRANSPOS MEDIAN NRV Jewel Highman Left 07/22/2022    Procedure: RELEASE CARPAL TUNNEL;  Surgeon: Joselyn Urrutia MD;  Location: [facility];  Service: Orthopedics    NV NEUROPLASTY &/TRANSPOSITION ULNAR NERVE ELBOW Left 07/22/2022    Procedure: RELEASE CUBITAL TUNNEL;  Surgeon: Joselyn Urrutia MD;  Location[facility];  Service: Orthopedics    NV NEUROPLASTY &/TRANSPOSITION ULNAR NERVE WRIST Left 07/22/2022    Procedure: Georgean Reels RELEASE;  Surgeon: Joselyn Urrutia MD;  Location: [facility];  Service: Orthopedics    SKIN CANCER EXCISION  2012    chin-per pt, basal cell  also right ankle        01/11/23 0803   PT Last Visit   PT Visit Date 01/11/23   Note Type   Note type Evaluation   Pain Assessment   Pain Assessment Tool 0-10   Pain Score No Pain  (denies)   Restrictions/Precautions   Weight Bearing Precautions Per Order No   Other Precautions Contact/isolation; Airborne/isolation; Fall Risk; Chair Alarm; Bed Alarm;O2;Multiple lines  (2 L NC baseline-wear at night via CPAP, during day as needed)   Home Living   Type of [address] [place] [address] ADLs on one level; Able to live on main level with bedroom/bathroom;Stairs to enter with rails  (3 SATNAM)   Bathroom Shower/Tub Walk-in shower   Bathroom Toilet Standard   Bathroom Equipment Commode  (did not utilize prior to arrival)   [address] Walker;Cane  (intermittent usage, SPC typically)   Prior Function   Level of [place] Independent with ADLs; Independent with functional mobility; Independent with IADLS   Lives With Spouse;Daughter;Family   Receives Help From Family  (prn)   IADLs Independent with driving; Independent with meal prep; Independent with medication management  (shares meal preparation and bill paying)   Falls in the last 6 months 1 to 4  ("1 or 2", reports the other day "my knee went out from under me" history of RLE issues s/p heart attack)   Vocational Retired   General   Family/Caregiver Present No   Cognition   Overall Cognitive Status WFL   Arousal/Participation Alert   Orientation Level Oriented X4   Memory Within functional limits   Following Commands Follows one step commands without difficulty   Comments Shlomo was agreeable to PT assessment, pleasant  RLE Assessment   RLE Assessment X  (3+/5 gross musculature)   LLE Assessment   LLE Assessment X  (3+/5 gross musculature)   Vision-Basic Assessment   Current Vision No visual deficits   Vestibular   Spontaneous Nystagmus (-) no evidence of nystagmus at rest in room light   Coordination   Movements are Fluid and Coordinated 1   Sharp/Dull   RLE Sharp/Dull   (reports B hands and feet pins and needles "comes and goes")   Bed Mobility   Supine to Sit 5  Supervision   Additional items Assist x 1;HOB elevated; Bedrails;Verbal cues; Impulsive   Sit to Supine   (DNT pt  was sitting out of bed on recliner upon conclusion )   Additional Comments Verbal cues for proper body mechanics and environmental awareness  J[place] Medina denied lightheadednes/dizziness with positional change  Transfers   Sit to Stand 5  Supervision  (close)   Additional items Assist x 1;Bedrails; Increased time required;Verbal cues  (RW present as precautionary measure, however did not require usage)   Stand to Sit 5  Supervision  (close)   Additional items Assist x 1;Bedrails; Increased time required;Verbal cues   Stand pivot 5  Supervision  (close)   Additional items Assist x 1; Increased time required;Verbal cues   Additional Comments Verbal cues for conservatory breathing strategy utilization and safety while turning  Ambulation/Elevation   Gait pattern Improper Weight shift; Short stride; Step to   Gait Assistance 5  Supervision  (close)   Additional items Assist x 1;Verbal cues   Assistive Device None   Distance 10 feet x 2   Stair Management Assistance Not tested   Ambulation/Elevation Additional Comments Verbal cues for proper body mechanics and base of support widening for stabilization  Balance   Static Sitting Good   Dynamic Sitting Fair +   Static Standing Fair +   Dynamic Standing Fair   Ambulatory Fair   Endurance Deficit   Endurance Deficit Yes   Endurance Deficit Description O2 desaturation to 82%/CHACON on room air  Supplemental O2 replaced and conservatory breathing utilization  Improvement to 88% upon 3 minutes of rest    Activity Tolerance   Activity Tolerance Patient limited by fatigue;Treatment limited secondary to medical complications (Comment)  (O2 desaturation)   Medical Staff Made Aware Yes, CM was informed of d/c disposition recommendation  Nurse Made Aware Yes, nursing staff was informed of assessment outcome  Assessment   Prognosis Good   Problem List Decreased strength;Decreased endurance; Impaired balance;Decreased mobility; Impaired sensation;Obesity; Decreased safety awareness   Assessment Pt is 65 y o  male seen for PT evaluation s/p admit to [facility]/2023 w/ Pneumonia due to COVID-19 virus  PT consulted to assess pt's functional mobility and d/c needs  Order placed for PT eval and tx, w/ activity as tolerated order  Comorbidities affecting pt's physical performance at time of assessment include: weakness,pneumonia due to COVID-19 virus, acute on chronic respiratory failure with hypoxia, ambulatory dysfunction, chronic diastolic CHF,COPD, hypokalemia  PTA, pt was independent w/ all functional mobility w/ intermittent SPC usage  Personal factors affecting pt at time of IE include: stairs to enter home, inability to navigate community distances, unable to perform dynamic tasks in community, positive fall history, impulsivity and inability to perform ADLs   Please find objective findings from PT assessment regarding body systems outlined above with impairments and limitations including weakness, impaired balance, decreased endurance, gait deviations, decreased activity tolerance, decreased functional mobility tolerance, decreased safety awareness, fall risk and SOB upon exertion  From PT/mobility standpoint, recommendation at time of d/c would be home with home health rehabilitation pending progress in order to facilitate return to PLOF  Goals   Patient Goals to get better soon   LTG Expiration Date 01/21/23   Long Term Goal #1 1 )Patient will complete bed mobility modified I for decrease need for caregiver assistance, decrease burden of care  2 ) Patient will complete transfers modified I to decrease risk of falls, facilitate upright standing posture  3 ) BLE strength to greater than/equal to 4/5 gross musculature to increase ability to safely transfer, control descent to chair  4 ) Patient will exhibit increase dynamic standing to Good 4-5 minutes without LOB distant supervision to improve activity tolerance  5 ) Patient will exhibit increase dynamic ambulatory balance to Fair+  feet w/AD prn distant supervision to improve ability to mobilize to toilet, chair and decrease risk for additional medical complications  6 ) Patient will exhibit good self monitoring and ability to follow 2 step commands to increase complexity of tasks and resume ADL's without LOB  PT Treatment Day 1  (please see below flow sheet and SOAP note)   Plan   Treatment/Interventions Functional transfer training;LE strengthening/ROM; Elevations; Therapeutic exercise; Endurance training;Patient/family training;Equipment eval/education;Gait training; Compensatory technique education;Spoke to nursing   PT Frequency 3-5x/wk   Recommendation   PT Discharge Recommendation Home with home health rehabilitation   Additional Comments Upon conclusion, pt  was sitting out of bed on recliner  All needs were within reach  Additional Comments 2 Pt's raw score on the AM-PAC Basic Mobility inpatient short form is 18, standardized score is 41 05   Patients at this level are likely to benefit from DC to HHPT  However, please refer to therapist recommendation for safe DC planning  AM-PAC Basic Mobility Inpatient   Turning in Flat Bed Without Bedrails 3   Lying on Back to Sitting on Edge of Flat Bed Without Bedrails 3   Moving Bed to Chair 3   Standing Up From Chair Using Arms 3   Walk in Room 3   Climb 3-5 Stairs With Railing 3   Basic Mobility Inpatient Raw Score 18   Basic Mobility Standardized Score 41 05   Highest Level Of Mobility   JH-HLM Goal 6: Walk 10 steps or more   JH-HLM Achieved 6: Walk 10 steps or more   Additional Treatment Session   Start Time 0817   End Time 0828   Treatment Assessment S: PT assessment had concluded and Billy Rodriguesjolene was agreeable to additional interventions  O: BLE therapeutic exercises x 11 minutes completed  Please see flow sheet for task details  A: Verbal cues for proper BUE placement and base of support widening with sit to stand performance stabilization  P: Continue advancing PT interventions as patient can safely tolerate  Exercises   Glute Sets Sitting;20 reps;AROM; Bilateral   Hip Flexion Sitting;20 reps;AROM; Bilateral   Hip Abduction Sitting;20 reps;AROM; Bilateral   Hip Adduction Sitting;20 reps;AROM; Bilateral   Ankle Pumps Sitting;20 reps;AROM; Bilateral   Balance training  sit to stands x 5 w/BUE utilization     History/Personal Factors/Comorbidities: weakness,pneumonia due to COVID-19 virus, acute on chronic respiratory failure with hypoxia, ambulatory dysfunction, chronic diastolic CHF,COPD, hypokalemia    # of body structures/limitations: muscle weakness, activity intolerance,decreased endurance, impaired balance, gait deviations    Clinical presentation: unstable as seen in CHACON, O2 desaturation on room air, impulsivity, progressive symptoms prior to hospitalization, high fall risk with positive fall history    Initial Assessment Time: [id number]    Tessy Smart, PT

## 2023-01-11 NOTE — PROGRESS NOTES
-- Patient:  -- MRN: [id number]  -- Aidin Request ID: [id number]  -- Level of care reserved: [address] [place] [address]  -- Partner Reserved: [facility], [address] [place] [address] [phone]  -- Clinical needs requested:  -- Geography searched: [phone]  -- Start of Service:  -- Request sent: 12:17pm EST on 1/11/2023 by Pancho Coleman  -- Partner reserved: 12:24pm EST on 1/11/2023 by Pancho Coleman  -- Choice list shared: 12:24pm EST on 1/11/2023 by Pancho Coleman

## 2023-01-11 NOTE — ASSESSMENT & PLAN NOTE
Wt Readings from Last 3 Encounters:   01/09/23 94 3 kg (208 lb)   12/30/22 94 3 kg (208 lb)   12/08/22 94 3 kg (208 lb)       -Continue home Lasix and potassium supplementation  -As per patient, metoprolol was recently discontinued by PCP

## 2023-01-11 NOTE — PLAN OF CARE
Problem: OCCUPATIONAL THERAPY ADULT  Goal: Performs self-care activities at highest level of function for planned discharge setting  See evaluation for individualized goals  Description: Treatment Interventions: ADL retraining, UE strengthening/ROM, Functional transfer training, Equipment evaluation/education, Patient/family training, Compensatory technique education, Energy conservation, Activityengagement          See flowsheet documentation for full assessment, interventions and recommendations  Note: Limitation: Decreased endurance, Decreased self-care trans, Decreased high-level ADLs  Prognosis: Good  Assessment: Patient is a 65 y o  male seen for OT evaluation s/p admit to  [address] on 1/9/2023 w/Pneumonia due to COVID-19 virus + commorbidities/PMHx (as listed in medical record) affecting patient's functional performance c ADL tasks at time of assessment  OT orders placed for evaluation and treatment to assess pt's ADLs, cognitive status + performance during functional tasks in order to formulate appropriate d/c recommendations  Therapist performed at least two patient identifiers during session including name and wristband  Personal factors affecting patient at time of initial evaluation include: step(s) to enter environment, inability to perform IADLs and inability to navigate community distances  Pt's clinical presentation is currently evolving given new onset deficits that effect pt's occupational performance and ability to safely return to PLOF including decrease activity tolerance, decrease standing balance, decrease performance during ADL tasks, decrease activity engagement and decrease performance during functional transfers combined with medical complications of abnormal renal lab values, abnormal CBC, abnormal blood sugars, low SpO2 values and new onset O2 use   This evaluation required an extensive review of medical and/or therapy records and additional review of physical, cognitive and psychosocial history related to functional performance  Based upon functional performance deficits and assessments, this evaluation has been identified as a  moderate complexity evaluation  Patient to benefit from continued Occupational Therapy treatment while in the hospital to address aforementioned deficits and maximize level of functional independence with ADLs and functional mobility  Pt currently demonstrates WFL ability to collaboratively engage in d/c planning and demonstrates G awareness in d/c plan  From OT standpoint, recommendation at time of d/c would be Home OT       OT Discharge Recommendation: Home with home health rehabilitation [Palpitations] : palpitations [Negative] : Heme/Lymph [SOB] : no shortness of breath [Leg Claudication] : no intermittent leg claudication [Syncope] : no syncope

## 2023-01-11 NOTE — OCCUPATIONAL THERAPY NOTE
Occupational Therapy Evaluation     Patient Name: Melina Torres  IEWJF'[id number] Date: 1/11/2023  Problem List  Principal Problem:    Pneumonia due to COVID-19 virus  Active Problems:    Hyperlipidemia    Chronic diastolic (congestive) heart failure (HCC)    Gastroesophageal reflux disease    Sleep apnea    Stage 3a chronic kidney disease (HCC)    Acute on chronic respiratory failure with hypoxia (HCC)    Ambulatory dysfunction    COPD (chronic obstructive pulmonary disease) ([facility] [address] )    Hypokalemia    Past Medical History  Past Medical History:   Diagnosis Date    Bilateral carotid artery stenosis     Cancer ([facility])     skin    Chronic diastolic heart failure ([facility])     Chronic ischemic heart disease     Chronic kidney disease     stage 3    Colon polyp     COPD (chronic obstructive pulmonary disease) ([facility])     Coronary artery disease     hx stents, MI, PCI    COVID 11/2021    CPAP (continuous positive airway pressure) dependence     Hearing loss     History of transfusion 1995    Hyperlipidemia     Hypertension     [facility] ) 1995    Myocardial infarction ([facility] ) 1995    Pneumonia     Prostate cancer ([place] [id number] )     RSV (respiratory syncytial virus infection) 10/2022    S/P carotid endarterectomy     Shortness of breath     O2 2 l/nc PRN    Sleep apnea     Sleep apnea, obstructive     Stented coronary artery      Past Surgical History  Past Surgical History:   Procedure Laterality Date    APPENDECTOMY      CARDIAC CATHETERIZATION  03/18/2021    left main with no significant disease, proximal LAD with 10% stenosis at the site of prior stent, left circumflex artery with minimal luminal irregularities mid RCA with 50% stenosis at site of prior stent and PL segment with distal disease supplied by collaterals from the distal circumflex with no significant CAD requiring revascularization at that time      CATARACT EXTRACTION, BILATERAL Bilateral     COLONOSCOPY      CORONARY ANGIOPLASTY WITH [address] RCA    CORONARY ANGIOPLASTY WITH STENT PLACEMENT  2001    RCA    CORONARY ANGIOPLASTY WITH STENT PLACEMENT  2003    LAD    EYE SURGERY      CO BX PROSTATE STRTCTC SATURATION SAMPLING IMG GID N/A 09/13/2022    Procedure: TRANSPERINEAL MRI FUSION  BIOPSY PROSTATE;  Surgeon: Gayathri Adams MD;  [facility]: [facility];  Service: Urology    CO NEUROPLASTY &/TRANSPOS MEDIAN NRV CARPAL Devon Able Left 07/22/2022    Procedure: RELEASE CARPAL TUNNEL;  Surgeon: Barber Tomlin MD;  Location: [facility];  Service: Orthopedics    CO NEUROPLASTY &/TRANSPOSITION ULNAR NERVE ELBOW Left 07/22/2022    Procedure: RELEASE CUBITAL TUNNEL;  Surgeon: Barber Tomlin MD;  [facility];  Service: Orthopedics    CO NEUROPLASTY &/TRANSPOSITION ULNAR NERVE WRIST Left 07/22/2022    Procedure: Isaias Octave RELEASE;  Surgeon: Barber Tomlin MD;  [facility];  Service: Orthopedics    SKIN CANCER EXCISION  2012    chin-per pt, basal cell  also right ankle         01/11/23 1207   OT Last Visit   OT Visit Date 01/11/23   Note Type   Note type Evaluation   Additional Comments Nsg staff verbally cleared pt for OT evaluation  Pt received  seated in bedside recliner on this date reporting no pain, dizziness or SOB + is agreeable to OT session @ this time  @ exit, pt remains in  seated in bedside recliner c all lines intact, all needs met, call bell in hand + nsg aware of location/disposition  Pain Assessment   Pain Assessment Tool 0-10   Pain Score No Pain   Patient's Stated Pain Goal No pain   Restrictions/Precautions   Weight Bearing Precautions Per Order No   Other Precautions Contact/isolation; Airborne/isolation; Fall Risk; Chair Alarm; Bed Alarm;O2;Multiple lines  (2 L NC baseline-wear at night via CPAP, during day as needed)   Home Living   Type of [facility] ADLs on one level; Able to live on main level with bedroom/bathroom;Stairs to enter with rails  (3 SATNAM)   Bathroom Shower/Tub Walk-in shower Bathroom Toilet Standard   Bathroom Equipment Commode  (did not utilize prior to arrival)   [address] Walker;Cane  (intermittent usage, SPC typically)   Prior Function   Level of [place] Independent with ADLs; Independent with functional mobility; Independent with IADLS   Lives With Spouse;Daughter;Family   Receives Help From Family  (prn)   IADLs Independent with driving; Independent with meal prep; Independent with medication management  (shares meal preparation and bill paying)   Falls in the last 6 months 1 to 4  ("1 or 2", reports the other day "my knee went out from under me" history of RLE issues s/p heart attack)   Vocational Retired  (, life insurance)   Lifestyle   Autonomy Pt lives in house c family + @ baseline, performs ADLs  I'ly, IADLs I'ly + fxl mobility I'ly with SPC  (+)   Pt is retired  Reciprocal Relationships Family   Service to Others Retired   [address]   Additional Pertinent History O2 @ home PRN, has portable + @night use   Subjective   Subjective "I think I am going home today"   ADL   Eating Assistance [address]  5  Supervision/Setup   Additional Comments Given fxl performance skills + medical complexity, therapist suspecting via clinical judgement + skilled analysis; pt currently requires stated assist above to perform each area of ADL d/t limitations including: sitting/standing balance deficits, fxl activity tolerance limitations, compromised pulmonary status, required level of supplemental O2 via n/c and risk of hypoxia  Bed Mobility   Additional Comments DNT bed mobility; pt received/remained seated in bedside recliner     Transfers   Sit to Stand 5 Supervision   Additional items Assist x 1;Bedrails   Stand to Sit 5  Supervision   Additional items Assist x 1;Bedrails   Stand pivot 5  Supervision   Additional items Assist x 1; Increased time required   Functional Mobility   Functional Mobility 5  Supervision   Additional Comments Pt performed short distance ADL related fxl mobility to/from bathroom c SUP without AD simulating toileting distances  No overt LOB demonstrated + pt denies pain /  minimal SOB/ no dizziness during transitional movements  Reports feeling steady on BLE and similar to typical stability on BLE  G safety awareness noted while navigating t/o room  Continues c prescribed O2 via n/c t/o session  and Transitions to bedside recliner for remainder of session  Balance   Static Sitting Good   Dynamic Sitting Fair +   Static Standing Fair +   Dynamic Standing Fair   Ambulatory Fair   Activity Tolerance   Activity Tolerance Patient limited by fatigue;Treatment limited secondary to medical complications (Comment)  (O2 desaturation)   RUE Assessment   RUE Assessment WFL   LUE Assessment   LUE Assessment WFL   Hand Function   Gross Motor Coordination Functional   Fine Motor Coordination Functional   Vision-Basic Assessment   Current Vision No visual deficits   Psychosocial   Psychosocial (WDL) WDL   Cognition   Overall Cognitive Status WFL   Arousal/Participation Alert; Responsive; Cooperative   Attention Within functional limits   Orientation Level Oriented X4   Memory Within functional limits   Following Commands Follows one step commands without difficulty   Assessment   Limitation Decreased endurance;Decreased self-care trans;Decreased high-level ADLs   Prognosis Good   Assessment Patient is a 65 y o  male seen for OT evaluation s/p admit to  [address] on 1/9/2023 w/Pneumonia due to COVID-19 virus + commorbidities/PMHx (as listed in medical record) affecting patient's functional performance c ADL tasks at time of assessment   OT orders placed for evaluation and treatment to assess pt's ADLs, cognitive status + performance during functional tasks in order to formulate appropriate d/c recommendations  Therapist performed at least two patient identifiers during session including name and wristband  Personal factors affecting patient at time of initial evaluation include: step(s) to enter environment, inability to perform IADLs and inability to navigate community distances  Pt's clinical presentation is currently evolving given new onset deficits that effect pt's occupational performance and ability to safely return to OF including decrease activity tolerance, decrease standing balance, decrease performance during ADL tasks, decrease activity engagement and decrease performance during functional transfers combined with medical complications of abnormal renal lab values, abnormal CBC, abnormal blood sugars, low SpO2 values and new onset O2 use  This evaluation required an extensive review of medical and/or therapy records and additional review of physical, cognitive and psychosocial history related to functional performance  Based upon functional performance deficits and assessments, this evaluation has been identified as a  moderate complexity evaluation  Patient to benefit from continued Occupational Therapy treatment while in the hospital to address aforementioned deficits and maximize level of functional independence with ADLs and functional mobility  Pt currently demonstrates WFL ability to collaboratively engage in d/c planning and demonstrates G awareness in d/c plan  From OT standpoint, recommendation at time of d/c would be Home OT  Goals   Patient Goals to go home today   Plan   Treatment Interventions ADL retraining;UE strengthening/ROM; Functional transfer training;Equipment evaluation/education;Patient/family training; Compensatory technique education; Energy conservation; Activityengagement   Goal Expiration Date 01/21/23   OT Treatment Day 0   OT Frequency 3-5x/wk   Recommendation   OT Discharge Recommendation Home with home health rehabilitation   AM-Merged with [facility] Daily Activity Inpatient   Lower Body Dressing 3   Bathing 3   Toileting 3   Upper Body Dressing 4   Grooming 4   Eating 4   Daily Activity Raw Score 21   Daily Activity Standardized Score (Calc for Raw Score >=11) 44 27   AM-Merged with [facility] Applied Cognition Inpatient   Following a Speech/Presentation 4   Understanding Ordinary Conversation 4   Taking Medications 4   Remembering Where Things Are Placed or Put Away 4   Remembering List of 4-5 Errands 4   Taking Care of Complicated Tasks 4   Applied Cognition Raw Score 24   Applied Cognition Standardized Score 62 21   Barthel Index   Feeding 10   Bathing 5   Grooming Score 5   Dressing Score 5   Bladder Score 10   Bowels Score 10   Toilet Use Score 5   Transfers (Bed/Chair) Score 10   Mobility (Level Surface) Score 10   Stairs Score 5   Barthel Index Score 75   Additional Treatment Session   Start Time 1340   End Time 1350   Treatment Assessment Facilitating energy conservation techniques relative to home routine in AM as pt noted c +SOB + fatigue following completion  Therapist educating pt on electric scooter, rollator + transport chair use for community mobility  The patient's raw score on the AM-PAC Daily Activity inpatient short form is 21, standardized score is 44 27, greater than 39 4  Patients at this level are likely to benefit from DC to home  Please refer to the recommendation of the Occupational Therapist for safe DC planning          GOALS:    *ADL transfers with (I) for inc'd independence with ADLs/purposeful tasks    *LB ADL with (I) using AE prn for inc'd independence with self cares    *Toileting with (I) for clothing management and hygiene for return to PLOF with personal care    *Increase stand tolerance x 5  m for inc'd tolerance with standing purposeful tasks    *Participate in 10m UE therex to increase overall stamina/activity tolerance for purposeful tasks    *Bed mobility- (I) for inc'd independence to manage own comfort and initiate EOB & OOB purposeful tasks    *Patient will verbalize 3 safety awareness/ principles to prevent falls in the home setting  *Patient will verbalize and demonstrate use of energy conservation/deep breathing techniques and work simplification skills during functional activities with no verbal cues  *Patient will increase OOB/sitting tolerance to 2-4 hours per day to increase participation in self-care and leisure tasks with no s/s of exertion  *Patient will engage in ongoing cognitive assessment to assist with safe discharge planning/recommendations       Malorie Moore, OTR/L

## 2023-01-11 NOTE — PROGRESS NOTES
01/10/23 2300   Oxygen Therapy   SpO2 95 %   SpO2 Activity At Rest   Nasal Cannula O2 Flow Rate (L/min) 2 L/min   Calculated FIO2 (%) - Nasal Cannula 28

## 2023-01-11 NOTE — PLAN OF CARE
Problem: Prexisting or High Potential for Compromised Skin Integrity  Goal: Skin integrity is maintained or improved  Description: INTERVENTIONS:  - Identify patients at risk for skin breakdown  - Assess and monitor skin integrity  - Assess and monitor nutrition and hydration status  - Monitor labs   - Assess for incontinence   - Turn and reposition patient  - Assist with mobility/ambulation  - Relieve pressure over bony prominences  - Avoid friction and shearing  - Provide appropriate hygiene as needed including keeping skin clean and dry  - Evaluate need for skin moisturizer/barrier cream  - Collaborate with interdisciplinary team   - Patient/family teaching  - Consider wound care consult   Outcome: Progressing     Problem: SAFETY ADULT  Goal: Patient will remain free of falls  Description: INTERVENTIONS:  - Educate patient/family on patient safety including physical limitations  - Instruct patient to call for assistance with activity   - Consult OT/PT to assist with strengthening/mobility   - Keep Call bell within reach  - Keep bed low and locked with side rails adjusted as appropriate  - Keep care items and personal belongings within reach  - Initiate and maintain comfort rounds  - Make Fall Risk Sign visible to staff  - Offer Toileting every 2 Hours, in advance of need  - Initiate/Maintain bed alarm  - Obtain necessary fall risk management equipment  - Apply yellow socks and bracelet for high fall risk patients  - Consider moving patient to room near nurses station  Outcome: Progressing  Goal: Maintain or return to baseline ADL function  Description: INTERVENTIONS:  -  Assess patient's ability to carry out ADLs; assess patient's baseline for ADL function and identify physical deficits which impact ability to perform ADLs (bathing, care of mouth/teeth, toileting, grooming, dressing, etc )  - Assess/evaluate cause of self-care deficits   - Assess range of motion  - Assess patient's mobility; develop plan if impaired  - Assess patient's need for assistive devices and provide as appropriate  - Encourage maximum independence but intervene and supervise when necessary  - Involve family in performance of ADLs  - Assess for home care needs following discharge   - Consider OT consult to assist with ADL evaluation and planning for discharge  - Provide patient education as appropriate  Outcome: Progressing  Goal: Maintains/Returns to pre admission functional level  Description: INTERVENTIONS:  - Perform BMAT or MOVE assessment daily    - Set and communicate daily mobility goal to care team and patient/family/caregiver  - Collaborate with rehabilitation services on mobility goals if consulted  - Perform Range of Motion 2 times a day  - Reposition patient every 2 hours    - Dangle patient 2 times a day  - Stand patient 2 times a day  - Ambulate patient 2 times a day  - Out of bed to chair 3 times a day   - Out of bed for meals 3 times a day  - Out of bed for toileting  - Record patient progress and toleration of activity level   Outcome: Progressing

## 2023-01-11 NOTE — Clinical Note
@ {pmloc:30073} {pmbody:83283}     During evaluation, {pmwho:94346} present in room  Given fxl performance skills + medical complexity, therapist suspecting via clinical judgement + skilled analysis; pt currently requires stated assist above to perform each area of ADL d/t limitations including: {pmdeficits:18479}      {pmdnt:17876}      Pt performed short distance ADL related fxl mobility {pmfxlmob:32107} {pmassistlevels:98310} {pmad:58348} simulating toileting distances  {pmpain:45346} overt LOB demonstrated + pt {pmc:75693} {pmd:58844} /  {pmpain:69265} SOB/ {pmpain:03938} dizziness during transitional movements  Reports feeling {pmst:23783}  {pmgf:80946} safety awareness noted while navigating t/o room  {pmfxl:27401}    @ {pmloc:32242} {pmbody:05869}         PT/OT co-eval on this date d/t medical complexity, ambulatory dysfunction c high fall risk, various impeding lines + requirement for skilled interdisciplinary analysis of appropriate d/c recommendations  PT/OT POC/goals I'ly determined per respective discipline  ({pmpt:87108})    Medical staff made aware of current fxn, recommendations for d/c planning, fall risk + pt's location upon exit  (***)    None reported  No acute changes reported since hospitalization  SBT administered: scored *** indicating {pmsbt:77634} cognitive impairment      1: 0 OR 1 (year)                ___ x 4 =  2: 0 OR 1 (month)                 ___ x 3 =  3: 0 OR 1 (time)                 ___ x 3 =  4: 0, 1 OR 2 (20-1)     ___ x 2 =  5: 0, 1 OR 2  (months)    ___ x 2 =  6: 0, 1, 2, 3, 4 or 5 (name/add)  ___ x 2 =    0-4= normal cognition   5-9= questionable impairment   >10= impairment consistent c dementia          Patient is a 65 y o  male seen for OT evaluation s/p admit to  {PM location:33571} on 1/9/2023 w/Pneumonia due to COVID-19 virus + commorbidities/PMHx (as listed in medical record) affecting patient's functional performance c ADL tasks at time of assessment  OT orders placed for evaluation and treatment to assess pt's ADLs, cognitive status + performance during functional tasks in order to formulate appropriate d/c recommendations  Therapist performed at least two patient identifiers during session including name and wristband  Personal factors affecting patient at time of initial evaluation include: {NCPERSONALFACTORS:99792}  Pt's clinical presentation is currently {MSLstable:88149} given new onset deficits that effect pt's occupational performance and ability to safely return to OF including {NCOTdeficits:20776} combined with medical complications of {NFUONJVTJXYXWULGWNFVIM:08091}  This evaluation required an extensive review of medical and/or therapy records and additional review of physical, cognitive and psychosocial history related to functional performance  Based upon functional performance deficits and assessments, this evaluation has been identified as a  {pmcomplex:88531} complexity evaluation  Patient to benefit from continued Occupational Therapy treatment while in the hospital to address aforementioned deficits and maximize level of functional independence with ADLs and functional mobility  Pt currently {pmdc:76793}  From OT standpoint, recommendation at time of d/c would be {RV recommendation:52681}  as pt appears to be at baseline level of function  Pt may benefit from rehab if  wife is unable to provide physical assist for mobility and ADLs    @ this time, pt presents @ baseline fxn including I c ADLs/fxl mobility  D/t aforementioned factors, no further skilled occupational therapy services warranted during hospitalization/ following return home

## 2023-01-11 NOTE — PLAN OF CARE
Problem: PHYSICAL THERAPY ADULT  Goal: Performs mobility at highest level of function for planned discharge setting  See evaluation for individualized goals  Description: Treatment/Interventions: Functional transfer training, LE strengthening/ROM, Elevations, Therapeutic exercise, Endurance training, Patient/family training, Equipment eval/education, Gait training, Compensatory technique education, Spoke to nursing          See flowsheet documentation for full assessment, interventions and recommendations  Note: Prognosis: Good  Problem List: Decreased strength, Decreased endurance, Impaired balance, Decreased mobility, Impaired sensation, Obesity, Decreased safety awareness  Assessment: Pt is 65 y o  male seen for PT evaluation s/p admit to [facility] on 1/9/2023 w/ Pneumonia due to COVID-19 virus  PT consulted to assess pt's functional mobility and d/c needs  Order placed for PT eval and tx, w/ activity as tolerated order  Comorbidities affecting pt's physical performance at time of assessment include: weakness,pneumonia due to COVID-19 virus, acute on chronic respiratory failure with hypoxia, ambulatory dysfunction, chronic diastolic CHF,COPD, hypokalemia  PTA, pt was independent w/ all functional mobility w/ intermittent SPC usage  Personal factors affecting pt at time of IE include: stairs to enter home, inability to navigate community distances, unable to perform dynamic tasks in community, positive fall history, impulsivity and inability to perform ADLs  Please find objective findings from PT assessment regarding body systems outlined above with impairments and limitations including weakness, impaired balance, decreased endurance, gait deviations, decreased activity tolerance, decreased functional mobility tolerance, decreased safety awareness, fall risk and SOB upon exertion   From PT/mobility standpoint, recommendation at time of d/c would be home with home health rehabilitation pending progress in order to facilitate return to PLOF  PT Discharge Recommendation: Home with home health rehabilitation    See flowsheet documentation for full assessment

## 2023-01-11 NOTE — DISCHARGE SUMMARY
[address]  Discharge- Usama Abdullahi 1945, 65 y o  male MRN: [id number]  Unit/Bed#: -01 Encounter: [id number]  Primary Care Provider: Sy Hernandez DO   Date and time admitted to hospital: 1/9/2023 10:08 AM    * Pneumonia due to COVID-19 virus  Assessment & Plan  Patient with known history of COPD chronically on 2 L   Currently requiring 2 L nasal cannula  Found to be COVID-positive in ED    Patient medically stable for discharge at this time, will be discharged home    Hypokalemia  Assessment & Plan  Potassium 2 9 on admission  Improved     -monitor while inpatient    COPD (chronic obstructive pulmonary disease) ([facility] [address] )  Assessment & Plan  Wears 2 L nasal cannula at baseline  Does not appear to be in overt exacerbation    -Continue home medications    Ambulatory dysfunction  Assessment & Plan  PT OT recommending home with home rehabilitation    Chronic diastolic (congestive) heart failure ([facility] [address] )  Assessment & Plan  Wt Readings from Last 3 Encounters:   01/09/23 94 3 kg (208 lb)   12/30/22 94 3 kg (208 lb)   12/08/22 94 3 kg (208 lb)       -Continue home Lasix and potassium supplementation  -As per patient, metoprolol was recently discontinued by PCP          Medical Problems     Resolved Problems  Date Reviewed: 1/11/2023   None       Discharging Physician / Practitioner: Selena Nugent DO  PCP: Sy Hernandez DO  Admission Date:   Admission Orders (From admission, onward)     Ordered        01/09/23 1203  INPATIENT ADMISSION  Once                      Discharge Date: 01/11/23    Consultations During Hospital Stay:  None  Procedures Performed:   · None    Significant Findings / Test Results:   · Covid Positive      Test Results Pending at Discharge (will require follow up):   · none     Outpatient Tests Requested:  · none    Complications:  none    Reason for Admission: Covid 19 pneumonia    Hospital Course:   Usama Abdullahi is a 65 y o  male patient who originally presented to the hospital on 1/9/2023 due to COVID-19 pneumonia  Patient with approximately aleida failure secondary to COVID-19 pneumonia, chronically on 2 L nasal cannula was requiring 4 L at time of admission  Was treated with IV Decadron and IV remdesivir  Patient saturating liters at time of discharge, states he feels great to go home  Patient will be discharged home  Patient will have home physical therapy  Patient advised follow-up with PCP within 7 days        Please see above list of diagnoses and related plan for additional information  Condition at Discharge: good    Discharge Day Visit / Exam:   Subjective: Seen and examined bedside  Patient states he feels good to go home  Vitals: Blood Pressure: 136/67 (01/11/23 0831)  Pulse: 77 (01/11/23 0831)  Temperature: (!) 96 °F (35 6 °C) (01/11/23 0831)  Temp Source: Tympanic (01/10/23 0759)  Respirations: 20 (01/11/23 0831)  Height: 5' 8" (172 7 cm) (01/09/23 1004)  Weight - Scale: 94 3 kg (208 lb) (01/09/23 1004)  SpO2: 92 % (01/11/23 0831)  Exam:   Physical Exam  Constitutional:       Appearance: He is obese  HENT:      Head: Normocephalic  Cardiovascular:      Rate and Rhythm: Normal rate and regular rhythm  Pulses: Normal pulses  Heart sounds: Normal heart sounds  Pulmonary:      Effort: Pulmonary effort is normal       Breath sounds: Normal breath sounds  Abdominal:      General: Abdomen is flat  Bowel sounds are normal       Palpations: Abdomen is soft  Musculoskeletal:         General: Normal range of motion  Cervical back: Normal range of motion  Skin:     General: Skin is warm and dry  Neurological:      General: No focal deficit present  Mental Status: He is alert and oriented to person, place, and time  Mental status is at baseline  Psychiatric:         Mood and Affect: Mood normal          Behavior: Behavior normal          Thought Content:  Thought content normal          Judgment: Judgment normal  Discharge instructions/Information to patient and family:   See after visit summary for information provided to patient and family  Provisions for Follow-Up Care:  See after visit summary for information related to follow-up care and any pertinent home health orders  Disposition:   Home with [facility] Services (Reminder: Complete face to face encounter)    Planned Readmission: no     Discharge Statement:  I spent 43 minutes discharging the patient  This time was spent on the day of discharge  I had direct contact with the patient on the day of discharge  Greater than 50% of the total time was spent examining patient, answering all patient questions, arranging and discussing plan of care with patient as well as directly providing post-discharge instructions  Additional time then spent on discharge activities  Discharge Medications:  See after visit summary for reconciled discharge medications provided to patient and/or family        **Please Note: This note may have been constructed using a voice recognition system**

## 2023-01-11 NOTE — ASSESSMENT & PLAN NOTE
Patient with known history of COPD chronically on 2 L   Currently requiring 2 L nasal cannula  Found to be COVID-positive in ED    Patient medically stable for discharge at this time, will be discharged home

## 2023-01-12 ENCOUNTER — HOME CARE VISIT (OUTPATIENT)
Dept: HOME HEALTH SERVICES | Facility: HOME HEALTHCARE | Age: 78
End: 2023-01-12

## 2023-01-12 ENCOUNTER — TRANSITIONAL CARE MANAGEMENT (OUTPATIENT)
Dept: FAMILY MEDICINE CLINIC | Facility: CLINIC | Age: 78
End: 2023-01-12

## 2023-01-12 NOTE — UTILIZATION REVIEW
NOTIFICATION OF ADMISSION DISCHARGE   This is a Notification of Discharge from [address]  Please be advised that this patient has been discharge from our facility  Below you will find the admission and discharge date and time including the patient’s disposition  UTILIZATION REVIEW CONTACT:  Braden Hart  Utilization   Network Utilization Review Department  Phone: [phone] carefully listen to the prompts  All voicemails are confidential   Email: [email or web address]     ADMISSION INFORMATION  PRESENTATION DATE: 1/9/2023 10:08 AM  OBERVATION ADMISSION DATE:   INPATIENT ADMISSION DATE: 1/9/23 12:03 PM   DISCHARGE DATE: 1/11/2023  4:49 PM   DISPOSITION:Home with [address] INFORMATION:  Send all requests for admission clinical reviews, approved or denied determinations and any other requests to dedicated fax number below belonging to the campus where the patient is receiving treatment   List of dedicated fax numbers:  [address] DENIALS (Administrative/Medical Necessity) [phone]   [address] (Maternity/NICU/Pediatrics) [phone]   [facility] [phone]   [place] [address] [phone]   [address] [phone]   [address] [facility] [phone]   90 [facility] [phone]   [facility] 119 [phone]   [facility]  [phone]   [address] [facility] [phone]   [address] [facility] [address] [facility] [phone]

## 2023-01-13 ENCOUNTER — TELEPHONE (OUTPATIENT)
Dept: UROLOGY | Facility: AMBULATORY SURGERY CENTER | Age: 78
End: 2023-01-13

## 2023-01-13 NOTE — TELEPHONE ENCOUNTER
Pt is scheduled for surgery on 01/31/23 and he needs information about it  He needs to know where it is an what it entails       Pt can be reached at [phone]

## 2023-01-16 ENCOUNTER — HOME CARE VISIT (OUTPATIENT)
Dept: HOME HEALTH SERVICES | Facility: HOME HEALTHCARE | Age: 78
End: 2023-01-16

## 2023-01-16 VITALS — OXYGEN SATURATION: 87 % | HEART RATE: 64 BPM

## 2023-01-16 NOTE — TELEPHONE ENCOUNTER
I spoke to the patient and answered all questions  I confirmed that the patient did receive the Emailed instructions sent to him that lists this information as well  Vaccine Information Statement    Influenza (Flu) Vaccine (Inactivated or Recombinant): What you need to know    Many Vaccine Information Statements are available in [place] and other languages. See www.immunize.org/vis  Hojas de Información Sobre Vacunas están disponibles en Español y en muchos otros idiomas. Visite www.immunize.org/vis    1. Why get vaccinated? Influenza (flu) is a contagious disease that spreads around the [place] every year, usually between October and May. Flu is caused by influenza viruses, and is spread mainly by coughing, sneezing, and close contact. Anyone can get flu. Flu strikes suddenly and can last several days. Symptoms vary by age, but can include:   fever/chills   sore throat   muscle aches   fatigue   cough   headache    runny or stuffy nose    Flu can also lead to pneumonia and blood infections, and cause diarrhea and seizures in children. If you have a medical condition, such as heart or lung disease, flu can make it worse. Flu is more dangerous for some people. Infants and young children, people 72years of age and older, pregnant women, and people with certain health conditions or a weakened immune system are at greatest risk. Each year thousands of people in the [facility] die from flu, and many more are hospitalized. Flu vaccine can:   keep you from getting flu,   make flu less severe if you do get it, and   keep you from spreading flu to your family and other people. 2. Inactivated and recombinant flu vaccines    A dose of flu vaccine is recommended every flu season. Children 6 months through 6years of age may need two doses during the same flu season. Everyone else needs only one dose each flu season.        Some inactivated flu vaccines contain a very small amount of a mercury-based preservative called thimerosal. Studies have not shown thimerosal in vaccines to be harmful, but flu vaccines that do not contain thimerosal are available. There is no live flu virus in flu shots. They cannot cause the flu. There are many flu viruses, and they are always changing. Each year a new flu vaccine is made to protect against three or four viruses that are likely to cause disease in the upcoming flu season. But even when the vaccine doesnt exactly match these viruses, it may still provide some protection    Flu vaccine cannot prevent:   flu that is caused by a virus not covered by the vaccine, or   illnesses that look like flu but are not. It takes about 2 weeks for protection to develop after vaccination, and protection lasts through the flu season. 3. Some people should not get this vaccine    Tell the person who is giving you the vaccine:     If you have any severe, life-threatening allergies. If you ever had a life-threatening allergic reaction after a dose of flu vaccine, or have a severe allergy to any part of this vaccine, you may be advised not to get vaccinated. Most, but not all, types of flu vaccine contain a small amount of egg protein.  If you ever had Guillain-Barré Syndrome (also called GBS). Some people with a history of GBS should not get this vaccine. This should be discussed with your doctor.  If you are not feeling well. It is usually okay to get flu vaccine when you have a mild illness, but you might be asked to come back when you feel better. 4. Risks of a vaccine reaction    With any medicine, including vaccines, there is a chance of reactions. These are usually mild and go away on their own, but serious reactions are also possible. Most people who get a flu shot do not have any problems with it.      Minor problems following a flu shot include:    soreness, redness, or swelling where the shot was given     hoarseness   sore, red or itchy eyes   cough   fever   aches   headache   itching   fatigue  If these problems occur, they usually begin soon after the shot and last 1 or 2 days. More serious problems following a flu shot can include the following:     There may be a small increased risk of Guillain-Barré Syndrome (GBS) after inactivated flu vaccine. This risk has been estimated at 1 or 2 additional cases per million people vaccinated. This is much lower than the risk of severe complications from flu, which can be prevented by flu vaccine.  Young children who get the flu shot along with pneumococcal vaccine (PCV13) and/or DTaP vaccine at the same time might be slightly more likely to have a seizure caused by fever. Ask your doctor for more information. Tell your doctor if a child who is getting flu vaccine has ever had a seizure. Problems that could happen after any injected vaccine:      People sometimes faint after a medical procedure, including vaccination. Sitting or lying down for about 15 minutes can help prevent fainting, and injuries caused by a fall. Tell your doctor if you feel dizzy, or have vision changes or ringing in the ears.  Some people get severe pain in the shoulder and have difficulty moving the arm where a shot was given. This happens very rarely.  Any medication can cause a severe allergic reaction. Such reactions from a vaccine are very rare, estimated at about 1 in a million doses, and would happen within a few minutes to a few hours after the vaccination. As with any medicine, there is a very remote chance of a vaccine causing a serious injury or death. The safety of vaccines is always being monitored. For more information, visit: www.cdc.gov/vaccinesafety/    5. What if there is a serious reaction? What should I look for?  Look for anything that concerns you, such as signs of a severe allergic reaction, very high fever, or unusual behavior.     Signs of a severe allergic reaction can include hives, swelling of the face and throat, difficulty breathing, a fast heartbeat, dizziness, and weakness - usually within a few minutes to a few hours after the vaccination. What should I do?  If you think it is a severe allergic reaction or other emergency that cant wait, call 9-1-1 and get the person to the nearest hospital. Otherwise, call your doctor.  Reactions should be reported to the Vaccine Adverse Event Reporting System (VAERS). Your doctor should file this report, or you can do it yourself through  the VAERS web site at www.vaers. [facility].gov, or by calling 9-617.281.4380. VAERS does not give medical advice. 6. The National Vaccine Injury Compensation Program    The [facility] Vaccine Injury Compensation Program (VICP) is a federal program that was created to compensate people who may have been injured by certain vaccines. Persons who believe they may have been injured by a vaccine can learn about the program and about filing a claim by calling 0-504.854.5030 or visiting the [address] website at www.[facility].gov/vaccinecompensation. There is a time limit to file a claim for compensation. 7. How can I learn more?  Ask your healthcare provider. He or she can give you the vaccine package insert or suggest other sources of information.  Call your local or state health department.  Contact the Centers for Disease Control and Prevention (CDC):  - Call 4-573.754.2607 (1-800-CDC-INFO) or  - Visit CDCs website at www.cdc.gov/flu    Vaccine Information Statement   Inactivated Influenza Vaccine   8/7/2015  42 DE[address] [id number]-25    Department of Health and Human Services  Centers for Disease Control and Prevention    Office Use Only

## 2023-01-16 NOTE — CASE COMMUNICATION
OT evaluation completed 1/16/23  No further visits planned at this time as patient is functioning at baseline for ADL's

## 2023-01-17 ENCOUNTER — HOME CARE VISIT (OUTPATIENT)
Dept: HOME HEALTH SERVICES | Facility: HOME HEALTHCARE | Age: 78
End: 2023-01-17

## 2023-01-17 VITALS
DIASTOLIC BLOOD PRESSURE: 72 MMHG | RESPIRATION RATE: 22 BRPM | OXYGEN SATURATION: 92 % | HEART RATE: 72 BPM | TEMPERATURE: 97.2 F | SYSTOLIC BLOOD PRESSURE: 136 MMHG

## 2023-01-17 NOTE — CASE COMMUNICATION
PT initial evaluation completed  Pt demonstrates independence for all mobility with AD  Balance score indicate low fall risk  No further skilled PT indicated at this time

## 2023-01-18 ENCOUNTER — APPOINTMENT (EMERGENCY)
Dept: RADIOLOGY | Facility: HOSPITAL | Age: 78
End: 2023-01-18

## 2023-01-18 ENCOUNTER — HOSPITAL ENCOUNTER (OUTPATIENT)
Facility: HOSPITAL | Age: 78
Setting detail: OBSERVATION
Discharge: HOME | End: 2023-01-19
Attending: EMERGENCY MEDICINE | Admitting: INTERNAL MEDICINE

## 2023-01-18 ENCOUNTER — HOME CARE VISIT (OUTPATIENT)
Dept: HOME HEALTH SERVICES | Facility: HOME HEALTHCARE | Age: 78
End: 2023-01-18

## 2023-01-18 DIAGNOSIS — R07.9 CHEST PAIN, UNSPECIFIED TYPE: Primary | ICD-10-CM

## 2023-01-18 LAB
2HR DELTA HS TROPONIN: -6 NG/L
4HR DELTA HS TROPONIN: -7 NG/L
ALBUMIN SERPL BCP-MCNC: 3.8 G/DL (ref 3.5–5)
ALP SERPL-CCNC: 73 U/L (ref 34–104)
ALT SERPL W P-5'-P-CCNC: 17 U/L (ref 7–52)
ANION GAP SERPL CALCULATED.3IONS-SCNC: 5 MMOL/L (ref 4–13)
AST SERPL W P-5'-P-CCNC: 23 U/L (ref 13–39)
BASOPHILS # BLD AUTO: 0.06 THOUSANDS/ÂΜL (ref 0–0.1)
BASOPHILS NFR BLD AUTO: 1 % (ref 0–1)
BILIRUB SERPL-MCNC: 0.87 MG/DL (ref 0.2–1)
BNP SERPL-MCNC: 41 PG/ML (ref 0–100)
BUN SERPL-MCNC: 15 MG/DL (ref 5–25)
CALCIUM SERPL-MCNC: 8.9 MG/DL (ref 8.4–10.2)
CARDIAC TROPONIN I PNL SERPL HS: 20 NG/L
CARDIAC TROPONIN I PNL SERPL HS: 21 NG/L
CARDIAC TROPONIN I PNL SERPL HS: 27 NG/L
CHLORIDE SERPL-SCNC: 99 MMOL/L (ref 96–108)
CO2 SERPL-SCNC: 37 MMOL/L (ref 21–32)
CREAT SERPL-MCNC: 1.35 MG/DL (ref 0.6–1.3)
EOSINOPHIL # BLD AUTO: 0.36 THOUSAND/ÂΜL (ref 0–0.61)
EOSINOPHIL NFR BLD AUTO: 4 % (ref 0–6)
ERYTHROCYTE [DISTWIDTH] IN BLOOD BY AUTOMATED COUNT: 14.8 % (ref 11.6–15.1)
GFR SERPL CREATININE-BSD FRML MDRD: 49 ML/MIN/1.73SQ M
GLUCOSE SERPL-MCNC: 111 MG/DL (ref 65–140)
HCT VFR BLD AUTO: 49.1 % (ref 36.5–49.3)
HGB BLD-MCNC: 15.5 G/DL (ref 12–17)
IMM GRANULOCYTES # BLD AUTO: 0.07 THOUSAND/UL (ref 0–0.2)
IMM GRANULOCYTES NFR BLD AUTO: 1 % (ref 0–2)
LYMPHOCYTES # BLD AUTO: 1.73 THOUSANDS/ÂΜL (ref 0.6–4.47)
LYMPHOCYTES NFR BLD AUTO: 18 % (ref 14–44)
MAGNESIUM SERPL-MCNC: 2.2 MG/DL (ref 1.9–2.7)
MCH RBC QN AUTO: 32.2 PG (ref 26.8–34.3)
MCHC RBC AUTO-ENTMCNC: 31.6 G/DL (ref 31.4–37.4)
MCV RBC AUTO: 102 FL (ref 82–98)
MONOCYTES # BLD AUTO: 1.27 THOUSAND/ÂΜL (ref 0.17–1.22)
MONOCYTES NFR BLD AUTO: 13 % (ref 4–12)
NEUTROPHILS # BLD AUTO: 6.33 THOUSANDS/ÂΜL (ref 1.85–7.62)
NEUTS SEG NFR BLD AUTO: 63 % (ref 43–75)
NRBC BLD AUTO-RTO: 0 /100 WBCS
PLATELET # BLD AUTO: 188 THOUSANDS/UL (ref 149–390)
PLATELET # BLD AUTO: 212 THOUSANDS/UL (ref 149–390)
PMV BLD AUTO: 10.2 FL (ref 8.9–12.7)
PMV BLD AUTO: 10.8 FL (ref 8.9–12.7)
POTASSIUM SERPL-SCNC: 3.4 MMOL/L (ref 3.5–5.3)
PROCALCITONIN SERPL-MCNC: 0.1 NG/ML
PROT SERPL-MCNC: 6 G/DL (ref 6.4–8.4)
RBC # BLD AUTO: 4.81 MILLION/UL (ref 3.88–5.62)
SODIUM SERPL-SCNC: 141 MMOL/L (ref 135–147)
WBC # BLD AUTO: 9.82 THOUSAND/UL (ref 4.31–10.16)

## 2023-01-18 RX ORDER — ALBUTEROL SULFATE 2.5 MG/3ML
2.5 SOLUTION RESPIRATORY (INHALATION) EVERY 6 HOURS PRN
Status: DISCONTINUED | OUTPATIENT
Start: 2023-01-18 | End: 2023-01-19 | Stop reason: HOSPADM

## 2023-01-18 RX ORDER — GABAPENTIN 300 MG/1
600 CAPSULE ORAL 2 TIMES DAILY
Status: DISCONTINUED | OUTPATIENT
Start: 2023-01-18 | End: 2023-01-19 | Stop reason: HOSPADM

## 2023-01-18 RX ORDER — FLUTICASONE PROPIONATE 50 MCG
1 SPRAY, SUSPENSION (ML) NASAL DAILY
Status: DISCONTINUED | OUTPATIENT
Start: 2023-01-18 | End: 2023-01-19 | Stop reason: HOSPADM

## 2023-01-18 RX ORDER — POTASSIUM CHLORIDE 20MEQ/15ML
20 LIQUID (ML) ORAL DAILY
Status: DISCONTINUED | OUTPATIENT
Start: 2023-01-18 | End: 2023-01-19 | Stop reason: HOSPADM

## 2023-01-18 RX ORDER — ATORVASTATIN CALCIUM 40 MG/1
80 TABLET, FILM COATED ORAL
Status: DISCONTINUED | OUTPATIENT
Start: 2023-01-18 | End: 2023-01-19 | Stop reason: HOSPADM

## 2023-01-18 RX ORDER — AZITHROMYCIN 250 MG/1
500 TABLET, FILM COATED ORAL 3 TIMES WEEKLY
Status: DISCONTINUED | OUTPATIENT
Start: 2023-01-18 | End: 2023-01-19 | Stop reason: HOSPADM

## 2023-01-18 RX ORDER — METOPROLOL SUCCINATE 25 MG/1
12.5 TABLET, EXTENDED RELEASE ORAL 2 TIMES DAILY
Status: DISCONTINUED | OUTPATIENT
Start: 2023-01-18 | End: 2023-01-19 | Stop reason: HOSPADM

## 2023-01-18 RX ORDER — ASPIRIN 81 MG/1
81 TABLET, CHEWABLE ORAL DAILY
Status: DISCONTINUED | OUTPATIENT
Start: 2023-01-19 | End: 2023-01-19 | Stop reason: HOSPADM

## 2023-01-18 RX ORDER — POTASSIUM CHLORIDE 20 MEQ/1
40 TABLET, EXTENDED RELEASE ORAL ONCE
Status: COMPLETED | OUTPATIENT
Start: 2023-01-18 | End: 2023-01-18

## 2023-01-18 RX ORDER — FUROSEMIDE 40 MG/1
40 TABLET ORAL DAILY
Status: DISCONTINUED | OUTPATIENT
Start: 2023-01-18 | End: 2023-01-19 | Stop reason: HOSPADM

## 2023-01-18 RX ORDER — FAMOTIDINE 20 MG/1
20 TABLET, FILM COATED ORAL
Status: DISCONTINUED | OUTPATIENT
Start: 2023-01-18 | End: 2023-01-19 | Stop reason: HOSPADM

## 2023-01-18 RX ORDER — ONDANSETRON 2 MG/ML
4 INJECTION INTRAMUSCULAR; INTRAVENOUS EVERY 6 HOURS PRN
Status: DISCONTINUED | OUTPATIENT
Start: 2023-01-18 | End: 2023-01-19 | Stop reason: HOSPADM

## 2023-01-18 RX ORDER — BUDESONIDE 0.5 MG/2ML
0.5 INHALANT ORAL
Status: DISCONTINUED | OUTPATIENT
Start: 2023-01-18 | End: 2023-01-19 | Stop reason: HOSPADM

## 2023-01-18 RX ORDER — HEPARIN SODIUM 5000 [USP'U]/ML
5000 INJECTION, SOLUTION INTRAVENOUS; SUBCUTANEOUS EVERY 8 HOURS SCHEDULED
Status: DISCONTINUED | OUTPATIENT
Start: 2023-01-18 | End: 2023-01-19 | Stop reason: HOSPADM

## 2023-01-18 RX ORDER — ACETAMINOPHEN 325 MG/1
650 TABLET ORAL EVERY 4 HOURS PRN
Status: DISCONTINUED | OUTPATIENT
Start: 2023-01-18 | End: 2023-01-19 | Stop reason: HOSPADM

## 2023-01-18 RX ADMIN — FLUTICASONE PROPIONATE 1 SPRAY: 50 SPRAY, METERED NASAL at 12:35

## 2023-01-18 RX ADMIN — SODIUM CHLORIDE 1000 ML: 0.9 INJECTION, SOLUTION INTRAVENOUS at 08:08

## 2023-01-18 RX ADMIN — METOPROLOL SUCCINATE 12.5 MG: 25 TABLET, FILM COATED, EXTENDED RELEASE ORAL at 22:44

## 2023-01-18 RX ADMIN — ATORVASTATIN CALCIUM 80 MG: 40 TABLET, FILM COATED ORAL at 15:59

## 2023-01-18 RX ADMIN — HEPARIN SODIUM 5000 UNITS: 5000 INJECTION INTRAVENOUS; SUBCUTANEOUS at 22:44

## 2023-01-18 RX ADMIN — FUROSEMIDE 40 MG: 40 TABLET ORAL at 12:35

## 2023-01-18 RX ADMIN — POTASSIUM CHLORIDE 20 MEQ: 20 SOLUTION ORAL at 12:35

## 2023-01-18 RX ADMIN — GABAPENTIN 600 MG: 300 CAPSULE ORAL at 19:10

## 2023-01-18 RX ADMIN — BUDESONIDE 0.5 MG: 0.5 INHALANT ORAL at 20:53

## 2023-01-18 RX ADMIN — POTASSIUM CHLORIDE 40 MEQ: 1500 TABLET, EXTENDED RELEASE ORAL at 08:10

## 2023-01-18 RX ADMIN — FAMOTIDINE 20 MG: 20 TABLET, FILM COATED ORAL at 22:44

## 2023-01-18 RX ADMIN — GABAPENTIN 600 MG: 300 CAPSULE ORAL at 12:34

## 2023-01-18 RX ADMIN — HEPARIN SODIUM 5000 UNITS: 5000 INJECTION INTRAVENOUS; SUBCUTANEOUS at 15:59

## 2023-01-18 RX ADMIN — IPRATROPIUM BROMIDE 1 PUFF: 17 AEROSOL, METERED RESPIRATORY (INHALATION) at 22:48

## 2023-01-18 RX ADMIN — AZITHROMYCIN MONOHYDRATE 500 MG: 250 TABLET ORAL at 12:34

## 2023-01-18 RX ADMIN — BUDESONIDE 0.5 MG: 0.5 INHALANT ORAL at 12:35

## 2023-01-18 RX ADMIN — METOPROLOL SUCCINATE 12.5 MG: 25 TABLET, FILM COATED, EXTENDED RELEASE ORAL at 13:52

## 2023-01-18 NOTE — ASSESSMENT & PLAN NOTE
· Patient presented to the ER this a m  with complaints of chest pain, midsternal non radiating  · Patient reports history of cardiac stents x10  · Patient took 2 sublingual nitro prior to arrival to the emergency room and reported relief in chest pain from 7 out of 10 to 1 out of 10  · Cardiology consulted  · Continue to monitor troponin's, first to negative thus far  · Monitor telemetry  · EKG on arrival shows normal sinus rhythm with premature atrial contractions

## 2023-01-18 NOTE — ASSESSMENT & PLAN NOTE
· Patient reports wears 2 L of oxygen as needed during the day, and wears CPAP at bedtime  · Currently on 2 L of oxygen, pulse ox 94% on same  · Chest x-ray- Redemonstration of bibasilar airspace opacities which appear similar to most recent radiograph of 1/9/2023  Findings are suspicious for bibasilar pneumonia versus atelectasis  · Check procalcitonin now we will hold antibiotics for now as patient is asymptomatic, repeat procalcitonin in a m    · Continue Zithromax at outpatient dosing for chronic COPD  · Continue nebulizer treatments as ordered Fall Risk

## 2023-01-18 NOTE — ED PROVIDER NOTES
History  No chief complaint on file  69-YEAR-OLD MALE    PMH:   CAD w/ 10 stents  COPD  HLD  HTN  Morbid Obesity  Prior smoker    Chief complaint: CP and SOB     HPI:   Pt here for cp that woke him about 50 min ago  Pain is ss  Pain is non radiating  Assoc w/ sob   No diaphoresis    Pain is rated as 8/10 initially  Woke him from sleep  Described as heaviness  HE DENIES ANY RADIATION OF PAIN TO THE JAW NECK OR THE BACK  Pain currently 0-1/10 after 2 SLN    No fever/chills  Has on going cough since COVID dx 9 days ago      VTE  RISK FACTORS:  NONE  NO HISTORY OF PE OR DVT  NO LONG CAR RIDES OR PLANE RIDES  NO IMMOBILIZATION  NO RECENT SURGERY OR TRAUMA  NO HEMOPTYSIS  OTHER ASSOCIATED SYMPTOMS:  NO ABDOMINAL PAIN  NO NAUSEA OR VOMITING  NO STOOL CHANGES  No other complaints       History provided by:  Patient  Chest Pain  Pain location:  Substernal area  Pain quality: pressure    Pain radiates to:  Does not radiate  Pain severity:  Severe  Onset quality:  Sudden  Relieved by:  Nitroglycerin  Worsened by:  Nothing tried  Associated symptoms: shortness of breath    Associated symptoms: no abdominal pain, no back pain, no cough, no diaphoresis, no dizziness, no dysphagia, no fatigue, no fever, no headache, no nausea, no palpitations and not vomiting        Prior to Admission Medications   Prescriptions Last Dose Informant Patient Reported? Taking?    Blood Pressure Monitor KIT   No No   Sig: Use daily   Patient taking differently: Use 40 mg daily   Tiotropium Bromide Monohydrate (SPIRIVA RESPIMAT IN)   Yes No   Sig: Inhale every morning   albuterol (2 5 mg/3 mL) 0 083 % nebulizer solution   No No   Sig: Take 3 mL (2 5 mg total) by nebulization every 6 (six) hours as needed for wheezing or shortness of breath   arformoterol (BROVANA) 15 mcg/2 mL nebulizer solution   No No   Sig: Take 2 mL (15 mcg total) by nebulization 2 (two) times a day   Patient not taking: Reported on 1/12/2023   aspirin 81 mg chewable tablet   Yes No   Sig: CHEW ONE TABLET BY MOUTH EVERY DAY   azithromycin (ZITHROMAX) 500 MG tablet   No No   Sig: Take 1 tablet (500 mg total) by mouth 3 (three) times a week   Patient taking differently: Take 500 mg by mouth 3 (three) times a week   budesonide (PULMICORT) 0 5 mg/2 mL nebulizer solution   No No   Sig: Take 2 mL (0 5 mg total) by nebulization every 12 (twelve) hours Rinse mouth after use    famotidine (PEPCID) 20 mg tablet   Yes No   Sig: Take 20 mg by mouth daily at bedtime   fluticasone (FLONASE) 50 mcg/act nasal spray   Yes No   Sig: into each nostril as needed    furosemide (LASIX) 40 mg tablet   No No   Sig: Take 1 tablet (40 mg total) by mouth daily   gabapentin (NEURONTIN) 300 mg capsule   No No   Sig: Take 2 capsules (600 mg total) by mouth 2 (two) times a day   potassium chloride (Klor-Con) 10 mEq tablet   No No   Sig: Take 2 tablets (20 mEq total) by mouth daily   rosuvastatin (CRESTOR) 40 MG tablet   No No   Sig: TAKE 1 TABLET DAILY   Patient taking differently: Take 40 mg by mouth daily at bedtime      Facility-Administered Medications: None       Past Medical History:   Diagnosis Date   • Bilateral carotid artery stenosis    • Cancer (HCC)     skin   • Chronic diastolic heart failure (HCC)    • Chronic ischemic heart disease    • Chronic kidney disease     stage 3   • Colon polyp    • COPD (chronic obstructive pulmonary disease) (HCC)    • Coronary artery disease     hx stents, MI, PCI   • COVID 11/2021   • CPAP (continuous positive airway pressure) dependence    • Hearing loss    • History of transfusion 1995   • Hyperlipidemia    • Hypertension    • MI (myocardial infarction) ([facility] [address] ) 1995   • Myocardial infarction ([facility] [address] ) 1995   • Pneumonia    • [facility]    • RSV (respiratory syncytial virus infection) 10/2022   • S/P carotid endarterectomy    • Shortness of breath     O2 2 l/nc PRN   • Sleep apnea    • Sleep apnea, obstructive    • Stented coronary artery        Past Surgical History:   Procedure Laterality Date   • APPENDECTOMY     • CARDIAC CATHETERIZATION  03/18/2021    left main with no significant disease, proximal LAD with 10% stenosis at the site of prior stent, left circumflex artery with minimal luminal irregularities mid RCA with 50% stenosis at site of prior stent and PL segment with distal disease supplied by collaterals from the distal circumflex with no significant CAD requiring revascularization at that time  • CATARACT EXTRACTION, BILATERAL Bilateral    • COLONOSCOPY     • CORONARY ANGIOPLASTY WITH STENT PLACEMENT  1999    RCA   • CORONARY ANGIOPLASTY WITH STENT PLACEMENT  2001    RCA   • CORONARY ANGIOPLASTY WITH STENT PLACEMENT  2003    LAD   • EYE SURGERY     • VA BX PROSTATE STRTCTC SATURATION SAMPLING IMG GID N/A 09/13/2022    Procedure: TRANSPERINEAL MRI FUSION  BIOPSY PROSTATE;  Surgeon: David Mejia MD;  Location: [facility];  Service: Urology   • VA NEUROPLASTY &/TRANSPOS MEDIAN NRV Triny Barefoot Left 07/22/2022    Procedure: RELEASE CARPAL TUNNEL;  Surgeon: Susana Solis MD;  Location: [facility];  Service: Orthopedics   • VA NEUROPLASTY &/TRANSPOSITION ULNAR NERVE ELBOW Left 07/22/2022    Procedure: RELEASE CUBITAL TUNNEL;  Surgeon: Susana Solis MD;  Location: [facility];  Service: Orthopedics   • VA NEUROPLASTY &/TRANSPOSITION ULNAR NERVE WRIST Left 07/22/2022    Procedure: Mozell Cargo;  Surgeon: Susana Solis MD;  Location: [facility];  Service: Orthopedics   • SKIN CANCER EXCISION  2012    chin-per pt, basal cell  also right ankle       Family History   Problem Relation Age of Onset   • Heart attack Mother    • Dementia Mother    • No Known Problems Father      I have reviewed and agree with the history as documented      E-Cigarette/Vaping   • E-Cigarette Use Never User      E-Cigarette/Vaping Substances   • Nicotine No    • THC No    • CBD No    • Flavoring No    • Other No    • Unknown No      Social History     Tobacco Use   • Smoking status: Former     Packs/day: 2 00     Years: 35 00     Pack years: 70 00     Types: Cigarettes     Quit date: 1995     Years since quittin 5   • Smokeless tobacco: Never   Vaping Use   • Vaping Use: Never used   Substance Use Topics   • Alcohol use: Not Currently   • Drug use: Never       Review of Systems   Constitutional: Negative for chills, diaphoresis, fatigue and fever  HENT: Negative for rhinorrhea, sinus pressure, sinus pain, sore throat, trouble swallowing and voice change  Respiratory: Positive for shortness of breath  Negative for cough, wheezing and stridor  Cardiovascular: Positive for chest pain  Negative for palpitations and leg swelling  Gastrointestinal: Negative for abdominal pain, blood in stool, nausea and vomiting  Genitourinary: Negative for difficulty urinating, dysuria, flank pain and frequency  Musculoskeletal: Negative for arthralgias, back pain, gait problem, joint swelling, myalgias, neck pain and neck stiffness  Skin: Negative for rash and wound  Neurological: Negative for dizziness, syncope, light-headedness and headaches  All other systems reviewed and are negative  Physical Exam  Physical Exam  Constitutional:       General: He is not in acute distress  Appearance: Normal appearance  He is well-developed  He is not ill-appearing, toxic-appearing or diaphoretic  HENT:      Head: Normocephalic and atraumatic  Right Ear: External ear normal       Left Ear: External ear normal       Nose: Nose normal       Mouth/Throat:      Pharynx: No oropharyngeal exudate or posterior oropharyngeal erythema  Eyes:      General: No scleral icterus  Right eye: No discharge  Left eye: No discharge  Extraocular Movements: Extraocular movements intact  Conjunctiva/sclera: Conjunctivae normal       Pupils: Pupils are equal, round, and reactive to light  Neck:      Vascular: No JVD  Trachea: No tracheal deviation  Cardiovascular:      Rate and Rhythm: Normal rate and regular rhythm  Pulses: Normal pulses  Heart sounds: Normal heart sounds  No murmur heard  No friction rub  No gallop  Pulmonary:      Effort: Pulmonary effort is normal  No respiratory distress  Breath sounds: No stridor  Wheezing (mild scattered ) present  No rhonchi or rales  Chest:      Chest wall: No tenderness  Abdominal:      General: Bowel sounds are normal  There is no distension  Palpations: Abdomen is soft  There is no mass  Tenderness: There is no abdominal tenderness  There is no right CVA tenderness, left CVA tenderness, guarding or rebound  Hernia: No hernia is present  Musculoskeletal:         General: No swelling, tenderness, deformity or signs of injury  Normal range of motion  Cervical back: Normal range of motion and neck supple  No rigidity or tenderness  Right lower leg: No edema  Left lower leg: No edema  Lymphadenopathy:      Cervical: No cervical adenopathy  Skin:     General: Skin is warm  Capillary Refill: Capillary refill takes less than 2 seconds  Coloration: Skin is not jaundiced or pale  Findings: No bruising, erythema, lesion or rash  Neurological:      General: No focal deficit present  Mental Status: He is alert and oriented to person, place, and time  Mental status is at baseline  Cranial Nerves: No cranial nerve deficit  Sensory: No sensory deficit  Motor: No weakness or abnormal muscle tone  Coordination: Coordination normal    Psychiatric:         Mood and Affect: Mood normal          Behavior: Behavior normal          Thought Content:  Thought content normal          Judgment: Judgment normal          Vital Signs  ED Triage Vitals   Temp Pulse Resp BP SpO2   -- -- -- -- --      Temp src Heart Rate Source Patient Position - Orthostatic VS BP Location FiO2 (%)   -- -- -- -- --      Pain Score       --           There were no vitals filed for this visit  Visual Acuity      ED Medications  Medications - No data to display    Diagnostic Studies  Results Reviewed     Procedure Component Value Units Date/Time    CBC and differential [[id number]]     Lab Status: No result Specimen: Blood     CMP [[id number]]     Lab Status: No result Specimen: Blood     Magnesium [[id number]]     Lab Status: No result Specimen: Blood     B-Type Natriuretic Peptide(BNP) AN, CA, EA Camp[facility] Only [[id number]]     Lab Status: No result Specimen: Blood     HS Troponin 0hr (reflex protocol) [[id number]]     Lab Status: No result Specimen: Blood                  X-ray chest 1 view portable    (Results Pending)              Procedures  Procedures         ED Course  ED Course as of 01/18/23 0650   Wed Jan 18, 2023   0649 Pt seen and evaluated    Here for acute CP and sob  H/o CAD  Pain nearly resolved after 2 SLN pta                                              Medical Decision Making  Pt here for acute cp/sob  Full evaluation for ACS in progress    counseled pt and family at bedside regarding ddx  Pt and family understand ddx of pt's s/s          Amount and/or Complexity of Data Reviewed  Independent Historian: spouse  Labs: ordered  Radiology: ordered  ECG/medicine tests: ordered and independent interpretation performed  Decision-making details documented in ED Course  Disposition  Final diagnoses:   None     ED Disposition     None      Follow-up Information    None         Patient's Medications   Discharge Prescriptions    No medications on file       No discharge procedures on file      PDMP Review       Value Time User    PDMP Reviewed  Yes 7/22/2022  8:46 AM Yoly Joyner PA-C          ED Provider  Electronically Signed by           Chuy Quan MD  01/18/23 [address], [place]  01/18/23 7715

## 2023-01-18 NOTE — ASSESSMENT & PLAN NOTE
· Patient noted to be COVID-positive 1/9/2023  · Was admitted to [facility] from 1/9/2023 through 1/11/2023, received remdesivir and IV Decadron while inpatient  · Wears oxygen at 2 L nasal cannula as needed during the day, currently on 2 L nasal cannula with oxygen saturation greater than 90%  · No further work-up/treatment at this time

## 2023-01-18 NOTE — ASSESSMENT & PLAN NOTE
· Patient presents with midsternal chest pain that woke him from his sleep this morning   · patient has history of cardiac stents x10  · Cardiology consult  · Continue metoprolol at preadmission dosing

## 2023-01-18 NOTE — ASSESSMENT & PLAN NOTE
- Known LVEF 45% on most recent transthoracic echocardiogram 9/29/2022  -Patient was off medical therapy apart from diuretic therapy in the outpatient setting  -We will reinitiate guideline directed medical therapy with metoprolol which also will have antianginal effect for patient   -Patient had issues with lisinopril in the past including cough and swelling and therefore we will avoid ACE inhibitor therapy at this time but could consider longer acting nitrate therapy if he tolerates beta-blocker therapy  -Creatinine mildly elevated today we will replete potassium to goal 4 0, magnesium 2 0 and restart diuretic dosing tomorrow  -Counseled patient on dietary lifestyle modifications

## 2023-01-18 NOTE — CONSULTS
[address] [facility] 1945, 65 y o  male MRN: [id number]  Unit/Bed#: ED 01 Encounter: [id number]  Primary Care Provider: Julisa Wheat DO   Date and time admitted to hospital: 1/18/2023  6:39 AM    Consult to cardiology  Consult performed by: Bertin Keita DO  Consult ordered by: Bubba Tee MD          COPD (chronic obstructive pulmonary disease) [facility])  Assessment & Plan  - With chronic 2 L nasal cannula oxygen use  - plan of care per primary team    Chronic ischemic heart disease  Assessment & Plan  - Known LVEF 45% on most recent transthoracic echocardiogram 9/29/2022  -Patient was off medical therapy apart from diuretic therapy in the outpatient setting  -We will reinitiate guideline directed medical therapy with metoprolol which also will have antianginal effect for patient   -Patient had issues with lisinopril in the past including cough and swelling and therefore we will avoid ACE inhibitor therapy at this time but could consider longer acting nitrate therapy if he tolerates beta-blocker therapy  -Creatinine mildly elevated today we will replete potassium to goal 4 0, magnesium 2 0 and restart diuretic dosing tomorrow  -Counseled patient on dietary lifestyle modifications    Coronary artery disease involving native coronary artery of native heart without angina pectoris  Assessment & Plan  - Currently without chest pain at this time  -Troponins negative x2  -We will continue aspirin and statin therapy with Crestor  -We will plan for eventual ischemic evaluation once patient more stable    Chest pain  Assessment & Plan  - EKG with nonspecific ST abnormalities at baseline however seems similar to previous  -Troponins negative x2  -Patient was off antianginal therapy at home with beta-blocker therapy will reinitiate and monitor for response  -Patient will benefit from ischemic evaluation once stabilized post COVID-19 infection      Other summary comments: -As ECG has nonspecific ST abnormalities at baseline and troponin is negative x2 we will continue to monitor patient under observation   -We will reinitiate metoprolol therapy which is also antianginal therapy and may be one of the reasons that patient had recurrence of symptoms when off beta-blocker therapy  -If patient's blood pressure tolerates we will also consider initiation of long-acting nitrate at that time however up titration of medical therapy in the outpatient setting was hindered by softer blood pressures  -Once patient has been stabilized from [place] standpoint as chest x-ray does still show significant bibasilar pneumonia versus atelectasis will likely have him undergo ischemic evaluation in the outpatient setting   -Continue to monitor patient on telemetry at this time and continue to monitor patient carefully   -Would replete electrolytes with goal potassium 4 0, magnesium 2 0  Outpatient Cardiologist: Dr Del Toro Foxworth    HPI: Will Petit is a 65y o  year old male with hypertension, hyperlipidemia, obesity, COPD with chronic 2 L nasal cannula oxygen use along with chronic antibiotic therapy as well as obstructive sleep apnea compliant with CPAP therapy, known coronary artery disease with history of MI in 1995 with PCI to RCA, stenting in 1999 and in 2001 involving RCA and then again in 2003 with stenting to LAD with cardiac catheterization in March 2021 showing no significant disease in need of revascularization along with asymptomatic PVCs who was recently hospitalized and in January 2023 for COVID-19 infection during which time patient's metoprolol was discontinued however documentation states that patient believed it was discontinued prior to this by his primary care physician   -He presents to the hospital for chest discomfort that woke him from sleep this morning center of chest without radiation that was relieved with nitroglycerin    Patient notes it was different than his previous chest pain that he had needed stenting for but in the setting of his concern for prior coronary artery disease he was presented for evaluation  While baseline EKG has nonspecific ST abnormalities troponins were trended and negative x2  Patient notes no recurrence of chest discomfort since this morning  He states he has not taken his metoprolol therapy since last hospitalization but has been taking his diuretic therapy at home  He still has cough and shortness of breath at baseline but has been using his nasal cannula oxygen  EKG:   -Currently in sinus rhythm on telemetry    MOST  RECENT CARDIAC IMAGING:   -Transthoracic echocardiogram 9/29/2022 showing ventricular systolic function mildly reduced estimated LVEF 45% with hypokinesis with regional variation and diastolic dysfunction grade 1 with mild aortic stenosis, mild to moderate mitral regurgitation, mild tricuspid regurgitation      Review of Systems:  Review of Systems   Constitutional: Negative for chills, diaphoresis, fatigue and fever  HENT: Negative for trouble swallowing and voice change  Eyes: Negative for pain and redness  Respiratory: Positive for cough and shortness of breath  Cardiovascular: Negative for chest pain, palpitations and leg swelling  Gastrointestinal: Negative for abdominal pain, constipation, diarrhea, nausea and vomiting  Genitourinary: Negative for dysuria  Musculoskeletal: Positive for arthralgias  Negative for neck pain and neck stiffness  Skin: Negative for rash  Neurological: Negative for dizziness, syncope and headaches  Psychiatric/Behavioral: Negative for agitation and confusion  All other systems reviewed and are negative          Historical Information   Past Medical History:   Diagnosis Date   • Bilateral carotid artery stenosis    • Cancer (HCC)     skin   • Chronic diastolic heart failure (HCC)    • Chronic ischemic heart disease    • Chronic kidney disease     stage 3   • Colon polyp    • COPD (chronic obstructive pulmonary disease) (HCC)    • Coronary artery disease     hx stents, MI, PCI   • COVID 11/2021   • CPAP (continuous positive airway pressure) dependence    • Hearing loss    • History of transfusion 1995   • Hyperlipidemia    • Hypertension    • MI (myocardial infarction) ([facility] [address] ) 1995   • Myocardial infarction ([facility] [address] ) 1995   • Pneumonia    • [facility]    • RSV (respiratory syncytial virus infection) 10/2022   • S/P carotid endarterectomy    • Shortness of breath     O2 2 l/nc PRN   • Sleep apnea    • Sleep apnea, obstructive    • Stented coronary artery      Past Surgical History:   Procedure Laterality Date   • APPENDECTOMY     • CARDIAC CATHETERIZATION  03/18/2021    left main with no significant disease, proximal LAD with 10% stenosis at the site of prior stent, left circumflex artery with minimal luminal irregularities mid RCA with 50% stenosis at site of prior stent and PL segment with distal disease supplied by collaterals from the distal circumflex with no significant CAD requiring revascularization at that time     • CATARACT EXTRACTION, BILATERAL Bilateral    • COLONOSCOPY     • CORONARY ANGIOPLASTY WITH STENT PLACEMENT  1999    RCA   • CORONARY ANGIOPLASTY WITH STENT PLACEMENT  2001    RCA   • CORONARY ANGIOPLASTY WITH STENT PLACEMENT  2003    LAD   • EYE SURGERY     • MD BX PROSTATE STRTCTC SATURATION SAMPLING IMG GID N/A 09/13/2022    Procedure: TRANSPERINEAL MRI FUSION  BIOPSY PROSTATE;  Surgeon: Anna Marie Valderrama MD;  Location: [facility];  Service: Urology   • MD NEUROPLASTY &/TRANSPOS MEDIAN NRV Claribel Patel Left 07/22/2022    Procedure: RELEASE CARPAL TUNNEL;  Surgeon: Faraz Restrepo MD;  Location: [facility];  Service: Orthopedics   • MD NEUROPLASTY &/TRANSPOSITION ULNAR NERVE ELBOW Left 07/22/2022    Procedure: RELEASE CUBITAL TUNNEL;  Surgeon: Faraz Restrepo MD;  Location: [facility];  Service: Orthopedics   • MD NEUROPLASTY &/TRANSPOSITION ULNAR NERVE WRIST Left 2022    Procedure: Sonido Mcgarry;  Surgeon: Carmelina Menard MD;  Location: [facility];  Service: Orthopedics   • SKIN CANCER EXCISION  2012    chin-per pt, basal cell  also right ankle     Social History     Substance and Sexual Activity   Alcohol Use Not Currently     Social History     Substance and Sexual Activity   Drug Use Never     Social History     Tobacco Use   Smoking Status Former   • Packs/day: 2 00   • Years: 35 00   • Pack years: 70 00   • Types: Cigarettes   • Quit date: 1995   • Years since quittin 5   Smokeless Tobacco Never       Family History:   Family History   Problem Relation Age of Onset   • Heart attack Mother    • Dementia Mother    • No Known Problems Father        Meds/Allergies   all current active meds have been reviewed  (Not in a hospital admission)      Allergies   Allergen Reactions   • Lisinopril Swelling and Cough   • Tetanus Antitoxin Anaphylaxis   • Tetanus Toxoid Anaphylaxis and Swelling       Objective   Vitals: Blood pressure 129/64, pulse 86, temperature 98 4 °F (36 9 °C), temperature source Tympanic, resp  rate 18, weight 94 3 kg (208 lb), SpO2 92 %  , Body mass index is 31 63 kg/m² ,   Orthostatic Blood Pressures    Flowsheet Row Most Recent Value   Blood Pressure 129/64 filed at 2023 1045   Patient Position - Orthostatic VS Sitting filed at 2023 7183          Systolic (56JXF), VWS:516 , Min:118 , OHF:202     Diastolic (84HZP), XOM:46, Min:64, Max:72    Physical Exam:  Physical Exam  Vitals reviewed  Constitutional:       General: He is not in acute distress  Appearance: He is obese  He is not diaphoretic  HENT:      Head: Normocephalic and atraumatic  Comments: Nasal cannula oxygen in place  Eyes:      General:         Right eye: No discharge  Left eye: No discharge  Neck:      Comments: Trachea midline, neck obese, difficult assess JVD  Cardiovascular:      Rate and Rhythm: Normal rate and regular rhythm  Heart sounds: Murmur (BRENDEN) heard  Pulmonary:      Effort: Pulmonary effort is normal  No respiratory distress  Comments: Decreased breath sounds bilaterally  Chest:      Chest wall: No tenderness  Abdominal:      General: Bowel sounds are normal       Palpations: Abdomen is soft  Tenderness: There is no abdominal tenderness  There is no rebound  Musculoskeletal:      Right lower leg: No edema  Left lower leg: No edema  Skin:     General: Skin is warm and dry  Neurological:      Mental Status: He is alert  Comments: Awake, alert, able answer questions appropriately, able to move extremities bilaterally     Psychiatric:         Mood and Affect: Mood normal          Behavior: Behavior normal          Lab Results:     Troponins:    Results from last 7 days   Lab Units 01/18/23  0901 01/18/23  0658   HS TNI 0HR ng/L  --  27   HS TNI 2HR ng/L 21  --    HSTNI D2 ng/L -6  --      BNP:   Results from last 6 Months   Lab Units 01/18/23  0658 01/09/23  1045   BNP pg/mL 41 61       CBC :   Results from last 7 days   Lab Units 01/18/23  0658   WBC Thousand/uL 9 82   HEMOGLOBIN g/dL 15 5   HEMATOCRIT % 49 1   MCV fL 102*   PLATELETS Thousands/uL 212     TSH:     CMP:   Results from last 7 days   Lab Units 01/18/23  0658   POTASSIUM mmol/L 3 4*   CHLORIDE mmol/L 99   CO2 mmol/L 37*   BUN mg/dL 15   CREATININE mg/dL 1 35*   AST U/L 23   ALT U/L 17   EGFR ml/min/1 73sq m 49     Lipid Profile:     Coags:

## 2023-01-18 NOTE — ASSESSMENT & PLAN NOTE
Lab Results   Component Value Date    EGFR 49 01/18/2023    EGFR 67 01/11/2023    EGFR 70 01/10/2023    CREATININE 1 35 (H) 01/18/2023    CREATININE 1 05 01/11/2023    CREATININE 1 02 01/10/2023   · Appears to be close to baseline, reviewed labs last 4 months  · Monitor chemistry

## 2023-01-18 NOTE — H&P
[address]  H&P- Nona Moritz 1945, 65 y o  male MRN: [id number]  Unit/Bed#: ED 01 Encounter: [id number]  Primary Care Provider: Lana Che DO   Date and time admitted to hospital: 1/18/2023  6:39 AM    * Chest pain  Assessment & Plan  · Patient presented to the ER this a m  with complaints of chest pain, midsternal non radiating  · Patient reports history of cardiac stents x10  · Patient took 2 sublingual nitro prior to arrival to the emergency room and reported relief in chest pain from 7 out of 10 to 1 out of 10  · Cardiology consulted  · Continue to monitor troponin's, first to negative thus far  · Monitor telemetry  · EKG on arrival shows normal sinus rhythm with premature atrial contractions  · Continue daily aspirin, continue metoprolol, continue statin    Coronary artery disease involving native coronary artery of native heart without angina pectoris  Assessment & Plan  · Patient presents with midsternal chest pain that woke him from his sleep this morning   · patient has history of cardiac stents x10  · Cardiology consult  · Continue metoprolol at preadmission dosing  · Please see above plan    Hypertension  Assessment & Plan  · Well-controlled on current regimen  · Continue metoprolol at preadmission dosing    COPD (chronic obstructive pulmonary disease) ([facility] [address] )  Assessment & Plan  · Patient reports wears 2 L of oxygen as needed during the day, and wears CPAP at bedtime  · Currently on 2 L of oxygen, pulse ox 94% on same  · Chest x-ray- Redemonstration of bibasilar airspace opacities which appear similar to most recent radiograph of 1/9/2023  Findings are suspicious for bibasilar pneumonia versus atelectasis  · Check procalcitonin now we will hold antibiotics for now as patient is asymptomatic, repeat procalcitonin in a m    · Continue Zithromax at outpatient dosing for chronic COPD  · Continue nebulizer treatments as ordered      Stage 3a chronic kidney disease [facility])  Assessment & Plan  Lab Results   Component Value Date    EGFR 49 01/18/2023    EGFR 67 01/11/2023    EGFR 70 01/10/2023    CREATININE 1 35 (H) 01/18/2023    CREATININE 1 05 01/11/2023    CREATININE 1 02 01/10/2023   · Appears to be close to baseline, reviewed labs last 4 months  · Monitor chemistry    Chronic ischemic heart disease  Assessment & Plan   · Patient has history of cardiac stenting x10  · Echo 9/29/2022-EF 45% with mildly reduced systolic function  · Continue diuretic at outpatient dosing      VTE Pharmacologic Prophylaxis: VTE Score: 8 High Risk (Score >/= 5) - Pharmacological DVT Prophylaxis Ordered: heparin  Sequential Compression Devices Ordered  Code Status: Level 1 - Full Code   Discussion with family: Patient declined call to   Anticipated Length of Stay: Patient will be admitted on an observation basis with an anticipated length of stay of less than 2 midnights secondary to Work-up for chest pain  Total Time for Visit, including Counseling / Coordination of Care: 45 minutes Greater than 50% of this total time spent on direct patient counseling and coordination of care  Chief Complaint: Midsternal chest pain    History of Present Illness:  Matthew Mann is a 65 y o  male with a PMH of hypertension, COPD, chronic kidney stage III, chronic ischemic heart disease who presents with complaints of midsternal chest pain  Patient states he awoke at 550 this morning from sleep with sharp midsternal chest pain  He describes the pain as constant and heavy with sharp intermittent stabbing  Denies radiation to arm or jaw, denies any nausea  Patient states that chest pain initially 7 out of 10, he took 2 nitroglycerin prehospital which improved his pain to a 1 out of 10  He reports that he came to the emergency room because his chest pain had not completely resolved and he was concerned  Chest pain has now resolved    ED attending spoke with on-call cardiology who recommended patient be admitted for observation  Review of Systems:  Review of Systems   Respiratory: Positive for cough  Cardiovascular: Positive for chest pain  All other systems reviewed and are negative  Past Medical and Surgical History:   Past Medical History:   Diagnosis Date   • Bilateral carotid artery stenosis    • Cancer (HCC)     skin   • Chronic diastolic heart failure (HCC)    • Chronic ischemic heart disease    • Chronic kidney disease     stage 3   • Colon polyp    • COPD (chronic obstructive pulmonary disease) (HCC)    • Coronary artery disease     hx stents, MI, PCI   • COVID 11/2021   • CPAP (continuous positive airway pressure) dependence    • Hearing loss    • History of transfusion 1995   • Hyperlipidemia    • Hypertension    • MI (myocardial infarction) ([facility] [address] ) 1995   • Myocardial infarction ([facility] [address] ) 1995   • Pneumonia    • Pro[facility]    • RSV (respiratory syncytial virus infection) 10/2022   • S/P carotid endarterectomy    • Shortness of breath     O2 2 l/nc PRN   • Sleep apnea    • Sleep apnea, obstructive    • Stented coronary artery        Past Surgical History:   Procedure Laterality Date   • APPENDECTOMY     • CARDIAC CATHETERIZATION  03/18/2021    left main with no significant disease, proximal LAD with 10% stenosis at the site of prior stent, left circumflex artery with minimal luminal irregularities mid RCA with 50% stenosis at site of prior stent and PL segment with distal disease supplied by collaterals from the distal circumflex with no significant CAD requiring revascularization at that time     • CATARACT EXTRACTION, BILATERAL Bilateral    • COLONOSCOPY     • CORONARY ANGIOPLASTY WITH STENT PLACEMENT  1999    RCA   • CORONARY ANGIOPLASTY WITH STENT PLACEMENT  2001    RCA   • CORONARY ANGIOPLASTY WITH STENT PLACEMENT  2003    LAD   • EYE SURGERY     • NH BX PROSTATE STRTCTC SATURATION SAMPLING IMG GID N/A 09/13/2022    Procedure: TRANSPERINEAL MRI FUSION  BIOPSY PROSTATE;  Surgeon: Moe Warren MD;  Location: [facility];  Service: Urology   • IL NEUROPLASTY &/TRANSPOS MEDIAN NRV Thomasenia Number Left 07/22/2022    Procedure: RELEASE CARPAL TUNNEL;  Surgeon: Katelynn Rice MD;  Location: [facility];  Service: Orthopedics   • IL NEUROPLASTY &/TRANSPOSITION ULNAR NERVE ELBOW Left 07/22/2022    Procedure: RELEASE CUBITAL TUNNEL;  Surgeon: Katelynn Rice MD;  Location: [facility];  Service: Orthopedics   • IL NEUROPLASTY &/TRANSPOSITION ULNAR NERVE WRIST Left 07/22/2022    Procedure: Hitesh Hew RELEASE;  Surgeon: Katelynn Rice MD;  Location: [facility];  Service: Orthopedics   • SKIN CANCER EXCISION  2012    chin-per pt, basal cell  also right ankle       Meds/Allergies:  Prior to Admission medications    Medication Sig Start Date End Date Taking? Authorizing Provider   albuterol (2 5 mg/3 mL) 0 083 % nebulizer solution Take 3 mL (2 5 mg total) by nebulization every 6 (six) hours as needed for wheezing or shortness of breath 11/25/22   Liz Lopez DO   aspirin 81 mg chewable tablet CHEW ONE TABLET BY MOUTH EVERY DAY    Historical Provider, MD   azithromycin (ZITHROMAX) 500 MG tablet Take 1 tablet (500 mg total) by mouth 3 (three) times a week  Patient taking differently: Take 500 mg by mouth 3 (three) times a week 11/4/22 2/2/23  Liz Lopez DO   Blood Pressure Monitor KIT Use daily  Patient taking differently: Use 40 mg daily 11/2/21   Berenice Vazquez DO   budesonide (PULMICORT) 0 5 mg/2 mL nebulizer solution Take 2 mL (0 5 mg total) by nebulization every 12 (twelve) hours Rinse mouth after use   11/25/22   Liz Lopez DO   famotidine (PEPCID) 20 mg tablet Take 20 mg by mouth daily at bedtime    Historical Provider, MD   fluticasone (FLONASE) 50 mcg/act nasal spray into each nostril as needed     Historical Provider, MD   furosemide (LASIX) 40 mg tablet Take 1 tablet (40 mg total) by mouth daily 1/20/22   Berenice Vazquez DO   gabapentin (NEURONTIN) 300 mg capsule Take 2 capsules (600 mg total) by mouth 2 (two) times a day 23   Patt Gilman,    potassium chloride (Klor-Con) 10 mEq tablet Take 2 tablets (20 mEq total) by mouth daily 22   Juan Allen DO   rosuvastatin (CRESTOR) 40 MG tablet TAKE 1 TABLET DAILY  Patient taking differently: Take 40 mg by mouth daily at bedtime 22   Juan Allen DO   Tiotropium Bromide Monohydrate ([address]) Inhale every morning    Historical Provider, MD   arformoterol (BROVANA) 15 mcg/2 mL nebulizer solution Take 2 mL (15 mcg total) by nebulization 2 (two) times a day  Patient not taking: Reported on 22  María Lopez DO   Ascorbic Acid (vitamin C) 1000 MG tablet Take 1,000 mg by mouth daily  Patient not taking: No sig reported  10/15/22  Historical Provider, MD   methocarbamol (ROBAXIN) 500 mg tablet Take 1 tablet (500 mg total) by mouth 3 (three) times a day as needed for muscle spasms  Patient not taking: No sig reported 4/27/22 10/15/22  Juan Allen DO     I have reviewed home medications with patient personally  Allergies:    Allergies   Allergen Reactions   • Lisinopril Swelling and Cough   • Tetanus Antitoxin Anaphylaxis   • Tetanus Toxoid Anaphylaxis and Swelling       Social History:  Marital Status: /Civil Union   Occupation: Retired  Patient Pre-hospital Living Situation: Home  Patient Pre-hospital Level of Mobility: walks  Patient Pre-hospital Diet Restrictions: None  Substance Use History:   Social History     Substance and Sexual Activity   Alcohol Use Not Currently     Social History     Tobacco Use   Smoking Status Former   • Packs/day: 2 00   • Years: 35 00   • Pack years: 70 00   • Types: Cigarettes   • Quit date: 1995   • Years since quittin 5   Smokeless Tobacco Never     Social History     Substance and Sexual Activity   Drug Use Never       Family History:  Family History   Problem Relation Age of Onset   • Heart attack Mother • Dementia Mother    • No Known Problems Father        Physical Exam:     Vitals:   Blood Pressure: 129/64 (01/18/23 1045)  Pulse: 86 (01/18/23 1045)  Temperature: 98 4 °F (36 9 °C) (01/18/23 0656)  Temp Source: Tympanic (01/18/23 0656)  Respirations: 18 (01/18/23 1045)  Weight - Scale: 94 3 kg (208 lb) (01/18/23 0656)  SpO2: 92 % (01/18/23 1045)    Physical Exam  Vitals and nursing note reviewed  Constitutional:       General: He is awake  He is not in acute distress  Appearance: Normal appearance  He is not ill-appearing  Interventions: Nasal cannula in place  Comments: 2 L nasal cannula, patient reports uses 2 L during the day at home as needed   Cardiovascular:      Rate and Rhythm: Normal rate and regular rhythm  Pulses: Normal pulses  Heart sounds: Normal heart sounds  No murmur heard  No friction rub  No gallop  Pulmonary:      Effort: Pulmonary effort is normal       Breath sounds: Examination of the right-upper field reveals wheezing  Examination of the left-upper field reveals wheezing  Examination of the right-middle field reveals wheezing  Examination of the left-middle field reveals wheezing  Examination of the right-lower field reveals wheezing  Examination of the left-lower field reveals wheezing  Wheezing present  Comments: Expiratory wheezing all fields on auscultation  Abdominal:      General: Bowel sounds are normal       Palpations: Abdomen is soft  Tenderness: There is no abdominal tenderness  Musculoskeletal:         General: Normal range of motion  Skin:     General: Skin is warm and dry  Neurological:      General: No focal deficit present  Mental Status: He is alert and oriented to person, place, and time  Mental status is at baseline  Psychiatric:         Mood and Affect: Mood normal          Behavior: Behavior normal  Behavior is cooperative  Thought Content:  Thought content normal           Additional Data:     Lab Results:  Results from last 7 days   Lab Units 01/18/23  0658   WBC Thousand/uL 9 82   HEMOGLOBIN g/dL 15 5   HEMATOCRIT % 49 1   PLATELETS Thousands/uL 212   NEUTROS PCT % 63   LYMPHS PCT % 18   MONOS PCT % 13*   EOS PCT % 4     Results from last 7 days   Lab Units 01/18/23  0658   SODIUM mmol/L 141   POTASSIUM mmol/L 3 4*   CHLORIDE mmol/L 99   CO2 mmol/L 37*   BUN mg/dL 15   CREATININE mg/dL 1 35*   ANION GAP mmol/L 5   CALCIUM mg/dL 8 9   ALBUMIN g/dL 3 8   TOTAL BILIRUBIN mg/dL 0 87   ALK PHOS U/L 73   ALT U/L 17   AST U/L 23   GLUCOSE RANDOM mg/dL 111                       Lines/Drains:  Invasive Devices     Peripheral Intravenous Line  Duration           Peripheral IV 01/18/23 Right Antecubital <1 day                    Imaging: Reviewed radiology reports from this admission including: chest xray  X-ray chest 1 view portable   Final Result by Kavitha Tan DO (01/18 7441)   1  Redemonstration of bibasilar airspace opacities which appear similar to most recent radiograph of 1/9/2023  Findings are suspicious for bibasilar pneumonia versus atelectasis  The study was marked in [facility] for immediate notification  Workstation performed: [id number]             EKG and Other Studies Reviewed on Admission:   · EKG: Normal sinus rhythm with PACs  ** Please Note: This note has been constructed using a voice recognition system   **

## 2023-01-18 NOTE — ASSESSMENT & PLAN NOTE
· Patient has history of cardiac stenting x10  · Echo 9/29/2022-EF 45% with mildly reduced systolic function

## 2023-01-18 NOTE — ASSESSMENT & PLAN NOTE
- Currently without chest pain at this time  -Troponins negative x2  -We will continue aspirin and statin therapy with Crestor  -We will plan for eventual ischemic evaluation once patient more stable

## 2023-01-18 NOTE — ED PROCEDURE NOTE
PROCEDURE  ECG 12 Lead Documentation Only    Date/Time: 1/18/2023 6:53 AM  Performed by: Savana Wang MD  Authorized by: Savana Wang MD     Indications / Diagnosis:  Cp sob   ECG reviewed by me, the ED Provider: yes    Patient location:  ED  Interpretation:     Interpretation: normal    Rate:     ECG rate:  93    ECG rate assessment: normal    Rhythm:     Rhythm: sinus rhythm    Ectopy:     Ectopy: none    QRS:     QRS axis:  Normal    QRS intervals:  Normal  Conduction:     Conduction: normal    ST segments:     ST segments:  Non-specific  T waves:     T waves: non-specific           Savana Wang MD  01/18/23 3991

## 2023-01-18 NOTE — ED CARE HANDOFF
Emergency Department Sign Out Note        Sign out and transfer of care from Dr Brie Bell  See Separate Emergency Department note  The patient, Heike Donovan, was evaluated by the previous provider for chest pain     Workup Completed:  Labs     ED Course / Workup Pending (followup): Patient was seen and examined by bedside awake alert oriented times 3/6/2015  He is currently denying any chest pain  Second EKG within normal limits  Delta negative  Discussed with cardiology recommending admission for observation  Heart score 6  HEART Risk Score    Flowsheet Row Most Recent Value   Heart Score Risk Calculator    History 1 Filed at: 01/18/2023 1041   ECG 0 Filed at: 01/18/2023 1041   Age 2 Filed at: 01/18/2023 1041   Risk Factors 2 Filed at: 01/18/2023 1041   Troponin 1 Filed at: 01/18/2023 1041   HEART Score 6 Filed at: 01/18/2023 1041                                  ED Course as of 01/18/23 1109   Wed Jan 18, 2023   0700 67yo male came with chest pain  for one hour   Improved after two SLN at home   0701 EKG stable   1035 Case discussed with Kitty Moya, he is in the ED seeing the pt    1102 Discuss with hospitalist Crystal Webb  accepted admission for chest pain     Procedures  MDM        Disposition  Final diagnoses:   Chest pain, unspecified type     Time reflects when diagnosis was documented in both MDM as applicable and the Disposition within this note     Time User Action Codes Description Comment    1/18/2023 10:35 AM Ines Hassan Add [R07 9] Chest pain, unspecified type       ED Disposition     ED Disposition   Admit    Condition   Stable    Date/Time   Wed Jan 18, 2023 11:07 AM    Comment   Case was discussed with Hector Caldwell and the patient's admission status was agreed to be Admission Status: observation status to the service of Dr Kandice Flanagan              Follow-up Information    None       Patient's Medications   Discharge Prescriptions    No medications on file     No discharge procedures on file         ED Provider  Electronically Signed by     Courtney uJles MD  01/18/23 8695

## 2023-01-18 NOTE — RESPIRATORY THERAPY NOTE
RT Protocol Note  Isha Melo 65 y o  male MRN: [id number]  Unit/Bed#: ED 01 Encounter: [id number]    Assessment    Principal Problem:    Chest pain  Active Problems:    Coronary artery disease involving native coronary artery of native heart without angina pectoris    Hypertension    Chronic ischemic heart disease    Stage 3a chronic kidney disease (HCC)    COVID    COPD (chronic obstructive pulmonary disease) ([facility])      Home Pulmonary Medications:  Home Devices/Therapy:  (home reg)    Past Medical History:   Diagnosis Date    Bilateral carotid artery stenosis     Cancer ([facility])     skin    Chronic diastolic heart failure (HCC)     Chronic ischemic heart disease     Chronic kidney disease     stage 3    Colon polyp     COPD (chronic obstructive pulmonary disease) ([facility])     Coronary artery disease     hx stents, MI, PCI    COVID 2021    CPAP (continuous positive airway pressure) dependence     Hearing loss     History of transfusion     Hyperlipidemia     Hypertension     MI (myocardial infarction) ([facility] )     Myocardial infarction ([facility] )     Pneumonia     Prostate cancer ([facility] )     RSV (respiratory syncytial virus infection) 10/2022    S/P carotid endarterectomy     Shortness of breath     O2 2 l/nc PRN    Sleep apnea     Sleep apnea, obstructive     Stented coronary artery      Social History     Socioeconomic History    Marital status: /Civil Union     Spouse name: Not on file    Number of children: Not on file    Years of education: Not on file    Highest education level: Not on file   Occupational History    Not on file   Tobacco Use    Smoking status: Former     Packs/day: 2 00     Years: 35 00     Pack years: 70 00     Types: Cigarettes     Quit date: 1995     Years since quittin 5    Smokeless tobacco: Never   Vaping Use    Vaping Use: Never used   Substance and Sexual Activity    Alcohol use: Not Currently    Drug use: Never    Sexual activity: Not on file   Other Topics Concern    Not on file   Social History Narrative    Not on file     Social Determinants of Health     Financial Resource Strain: Not on file   Food Insecurity: No Food Insecurity    Worried About Running Out of Food in the Last Year: Never true    Ran Out of Food in the Last Year: Never true   Transportation Needs: No Transportation Needs    Lack of Transportation (Medical): No    Lack of Transportation (Non-Medical): No   Physical Activity: Not on file   Stress: Not on file   Social Connections: Not on file   Intimate Partner Violence: Not on file   Housing Stability: Low Risk     Unable to Pay for Housing in the Last Year: No    Number of Places Lived in the Last Year: 1    Unstable Housing in the Last Year: No       Subjective         Objective    Physical Exam:   Assessment Type: Assess only  General Appearance: Awake, Alert  Respiratory Pattern: Normal  Chest Assessment: Chest expansion symmetrical  Bilateral Breath Sounds: Diminished    Vitals:  Blood pressure 129/64, pulse 86, temperature 98 4 °F (36 9 °C), temperature source Tympanic, resp  rate 18, weight 94 3 kg (208 lb), SpO2 92 %  Imaging and other studies: I have personally reviewed pertinent reports  Plan    Respiratory Plan: Home Bronchodilator Patient pathway        Resp Comments: pt on 3L nc resting comfortable and eating lunch at this time  no sob detected   continuing pt on home medication regmine with a PRN albuterol and pulmicort BID

## 2023-01-18 NOTE — ASSESSMENT & PLAN NOTE
- EKG with nonspecific ST abnormalities at baseline however seems similar to previous  -Troponins negative x2  -Patient was off antianginal therapy at home with beta-blocker therapy will reinitiate and monitor for response  -Patient will benefit from ischemic evaluation once stabilized post COVID-19 infection

## 2023-01-19 ENCOUNTER — TELEPHONE (OUTPATIENT)
Dept: CARDIOLOGY CLINIC | Facility: CLINIC | Age: 78
End: 2023-01-19

## 2023-01-19 ENCOUNTER — RA CDI HCC (OUTPATIENT)
Dept: OTHER | Facility: HOSPITAL | Age: 78
End: 2023-01-19

## 2023-01-19 VITALS
OXYGEN SATURATION: 92 % | SYSTOLIC BLOOD PRESSURE: 112 MMHG | TEMPERATURE: 98.4 F | BODY MASS INDEX: 31.63 KG/M2 | HEART RATE: 71 BPM | DIASTOLIC BLOOD PRESSURE: 56 MMHG | RESPIRATION RATE: 14 BRPM | WEIGHT: 208 LBS

## 2023-01-19 LAB
ANION GAP SERPL CALCULATED.3IONS-SCNC: 3 MMOL/L (ref 4–13)
ATRIAL RATE: 84 BPM
ATRIAL RATE: 87 BPM
ATRIAL RATE: 93 BPM
BUN SERPL-MCNC: 13 MG/DL (ref 5–25)
CALCIUM SERPL-MCNC: 8.5 MG/DL (ref 8.4–10.2)
CHLORIDE SERPL-SCNC: 100 MMOL/L (ref 96–108)
CO2 SERPL-SCNC: 38 MMOL/L (ref 21–32)
CREAT SERPL-MCNC: 1.2 MG/DL (ref 0.6–1.3)
ERYTHROCYTE [DISTWIDTH] IN BLOOD BY AUTOMATED COUNT: 14.6 % (ref 11.6–15.1)
GFR SERPL CREATININE-BSD FRML MDRD: 57 ML/MIN/1.73SQ M
GLUCOSE SERPL-MCNC: 104 MG/DL (ref 65–140)
HCT VFR BLD AUTO: 46 % (ref 36.5–49.3)
HGB BLD-MCNC: 14.6 G/DL (ref 12–17)
MAGNESIUM SERPL-MCNC: 2.1 MG/DL (ref 1.9–2.7)
MCH RBC QN AUTO: 32.4 PG (ref 26.8–34.3)
MCHC RBC AUTO-ENTMCNC: 31.7 G/DL (ref 31.4–37.4)
MCV RBC AUTO: 102 FL (ref 82–98)
P AXIS: 73 DEGREES
P AXIS: 75 DEGREES
P AXIS: 77 DEGREES
PLATELET # BLD AUTO: 163 THOUSANDS/UL (ref 149–390)
PMV BLD AUTO: 10.1 FL (ref 8.9–12.7)
POTASSIUM SERPL-SCNC: 3.7 MMOL/L (ref 3.5–5.3)
PR INTERVAL: 160 MS
PR INTERVAL: 162 MS
PR INTERVAL: 162 MS
PROCALCITONIN SERPL-MCNC: 0.13 NG/ML
QRS AXIS: 66 DEGREES
QRS AXIS: 68 DEGREES
QRS AXIS: 75 DEGREES
QRSD INTERVAL: 82 MS
QRSD INTERVAL: 88 MS
QRSD INTERVAL: 92 MS
QT INTERVAL: 376 MS
QT INTERVAL: 380 MS
QT INTERVAL: 402 MS
QTC INTERVAL: 457 MS
QTC INTERVAL: 467 MS
QTC INTERVAL: 475 MS
RBC # BLD AUTO: 4.5 MILLION/UL (ref 3.88–5.62)
SODIUM SERPL-SCNC: 141 MMOL/L (ref 135–147)
T WAVE AXIS: 47 DEGREES
T WAVE AXIS: 55 DEGREES
T WAVE AXIS: 80 DEGREES
VENTRICULAR RATE: 84 BPM
VENTRICULAR RATE: 87 BPM
VENTRICULAR RATE: 93 BPM
WBC # BLD AUTO: 6.55 THOUSAND/UL (ref 4.31–10.16)

## 2023-01-19 RX ORDER — METOPROLOL SUCCINATE 25 MG/1
12.5 TABLET, EXTENDED RELEASE ORAL 2 TIMES DAILY
Qty: 30 TABLET | Refills: 0 | Status: SHIPPED | OUTPATIENT
Start: 2023-01-19

## 2023-01-19 RX ADMIN — FLUTICASONE PROPIONATE 1 SPRAY: 50 SPRAY, METERED NASAL at 07:49

## 2023-01-19 RX ADMIN — BUDESONIDE 0.5 MG: 0.5 INHALANT ORAL at 07:53

## 2023-01-19 RX ADMIN — AZITHROMYCIN MONOHYDRATE 500 MG: 250 TABLET ORAL at 07:50

## 2023-01-19 RX ADMIN — IPRATROPIUM BROMIDE 1 PUFF: 17 AEROSOL, METERED RESPIRATORY (INHALATION) at 09:05

## 2023-01-19 RX ADMIN — ASPIRIN 81 MG CHEWABLE TABLET 81 MG: 81 TABLET CHEWABLE at 07:50

## 2023-01-19 RX ADMIN — GABAPENTIN 300 MG: 300 CAPSULE ORAL at 07:51

## 2023-01-19 RX ADMIN — POTASSIUM CHLORIDE 20 MEQ: 20 SOLUTION ORAL at 07:49

## 2023-01-19 RX ADMIN — FUROSEMIDE 40 MG: 40 TABLET ORAL at 07:51

## 2023-01-19 RX ADMIN — METOPROLOL SUCCINATE 12.5 MG: 25 TABLET, FILM COATED, EXTENDED RELEASE ORAL at 09:05

## 2023-01-19 NOTE — PROGRESS NOTES
[address]  Progress Note - Chai Friend 1945, 65 y o  male MRN: [id number]  Unit/Bed#: ED 28 Encounter: [id number]  Primary Care Provider: Jaiden Camejo DO   Date and time admitted to hospital: 1/18/2023  6:39 AM    COPD (chronic obstructive pulmonary disease) ([address] )  Assessment & Plan  -On 2 L nasal cannula oxygen at home chronically  -Continue plan of care per primary team      Chronic ischemic heart disease  Assessment & Plan  - Known LVEF 45% on most recent transthoracic echocardiogram 9/29/2022  -Patient was off medical therapy apart from diuretic therapy in the outpatient setting  -Patient is tolerating metoprolol succinate 12 5 mg twice daily and she will be continued on this in the outpatient setting along with his diuretic and potassium regimen as long as renal function and electrolytes appear stable on morning laboratory studies  -Patient counseled on dietary and lifestyle modifications  Coronary artery disease involving native coronary artery of native heart without angina pectoris  Assessment & Plan  -Patient has been chest pain-free since admission  -Would continue with aspirin and Crestor  -Patient will need outpatient ischemic evaluation once more stable from COVID standpoint  * Chest pain  Assessment & Plan  -EKG with nonspecific ST abnormalities at baseline  -Troponins negative x3  -Patient without recurrence of symptomatology and was off antianginal therapy at home as beta-blocker has been discontinued but seems to be tolerating well on metoprolol succinate 12 5 mg twice daily  -Will have patient set up for follow-up in the outpatient setting and he will need stress testing prior to undergoing planned operative procedures once stabilized post COVID-19 infection  Outpatient Cardiologist: Dr Jiang Beams:   Patient seen and examined    Patient notes overall he feels much better and has not had any significant recurrence of symptoms since admission  He denies any chest pain, palpitations, lightness or dizziness, loss of consciousness, shortness of breath or lower extremity edema  He states overall he feels well  Summary comments:  -Pending results of laboratory studies if renal function has improved we will continue current medical therapy in the outpatient setting including metoprolol succinate 12 5 mg twice daily and diuretic therapy with potassium supplementation   -Patient will need hospital follow-up and stress testing  -I have sent message to my office staff to contact him for this  Telemetry/ECG/Cardiac testing:   Sinus rhythm with PVCs    Vitals: Blood pressure 112/57, pulse 78, temperature 98 4 °F (36 9 °C), temperature source Tympanic, resp  rate 20, weight 94 3 kg (208 lb), SpO2 90 %  ,   Orthostatic Blood Pressures    Flowsheet Row Most Recent Value   Blood Pressure 112/57 filed at 01/19/2023 1000   Patient Position - Orthostatic VS Sitting filed at 01/18/2023 0656      ,   Weight (last 2 days)     Date/Time Weight    01/18/23 0656 94 3 (208)          Physical Exam:  Physical Exam  Vitals reviewed  Constitutional:       General: He is not in acute distress  Appearance: He is obese  He is not diaphoretic  HENT:      Head: Normocephalic and atraumatic  Comments: Nasal CPAP therapy in place while patient was resting  Eyes:      General:         Right eye: No discharge  Left eye: No discharge  Neck:      Comments: Trachea midline, neck obese, difficult to assess JVD  Cardiovascular:      Rate and Rhythm: Normal rate and regular rhythm  Heart sounds: No friction rub  Pulmonary:      Effort: Pulmonary effort is normal  No respiratory distress  Comments: Decreased breath sounds bilaterally  Chest:      Chest wall: No tenderness  Abdominal:      General: Bowel sounds are normal       Palpations: Abdomen is soft  Tenderness: There is no abdominal tenderness  There is no rebound  Musculoskeletal:      Right lower leg: No edema  Left lower leg: No edema  Skin:     General: Skin is warm and dry  Neurological:      Mental Status: He is alert        Comments: Pleasant mood, pleasant affect   Psychiatric:         Mood and Affect: Mood normal          Behavior: Behavior normal          Medications:      Current Facility-Administered Medications:   •  acetaminophen (TYLENOL) tablet 650 mg, 650 mg, Oral, Q4H PRN, INOCENTE Sutherland  •  albuterol inhalation solution 2 5 mg, 2 5 mg, Nebulization, Q6H PRN, INOCENTE Sutherland  •  aspirin chewable tablet 81 mg, 81 mg, Oral, Daily, INOCENTE Sutherland, 81 mg at 01/19/23 0750  •  atorvastatin (LIPITOR) tablet 80 mg, 80 mg, Oral, Daily With Dinner, INOCENTE Sutehrland, 80 mg at 01/18/23 1559  •  azithromycin (ZITHROMAX) tablet 500 mg, 500 mg, Oral, Once per day on Mon Wed Fri, INOCENTE Sutherland, 500 mg at 01/19/23 0750  •  budesonide (PULMICORT) inhalation solution 0 5 mg, 0 5 mg, Nebulization, Q12H, INOCENTE Sutherland, 0 5 mg at 01/19/23 0753  •  famotidine (PEPCID) tablet 20 mg, 20 mg, Oral, HS, INOCENTE Sutherland, 20 mg at 01/18/23 2244  •  fluticasone (FLONASE) 50 mcg/act nasal spray 1 spray, 1 spray, Nasal, Daily, INOCENTE Sutherland, 1 spray at 01/19/23 0749  •  furosemide (LASIX) tablet 40 mg, 40 mg, Oral, Daily, INOCENTE Sutherland, 40 mg at 01/19/23 0751  •  gabapentin (NEURONTIN) capsule 600 mg, 600 mg, Oral, BID, INOCENTE Sutherland, 300 mg at 01/19/23 0751  •  heparin (porcine) subcutaneous injection 5,000 Units, 5,000 Units, Subcutaneous, Q8H [address], 5,000 Units at 01/18/23 2244 **AND** [COMPLETED] Platelet count, , , Once, INOCENTE Sutherland  •  ipratropium (ATROVENT HFA) inhaler 1 puff, 1 puff, Inhalation, Q12H SVETLANA, INOCENTE Sutherland, 1 puff at 01/19/23 0905  •  metoprolol succinate (TOPROL-XL) 24 hr tablet 12 5 mg, 12 5 mg, Oral, BID, INOCENTE Sutherland, 12 5 mg at 01/19/23 8748  •  ondansetron (ZOFRAN) injection 4 mg, 4 mg, Intravenous, Q6H PRN, INOCENTE Sutherland  •  potassium chloride oral solution 20 mEq, 20 mEq, Oral, Daily, INOCENTE Sutherland, 20 mEq at 01/19/23 0767    Current Outpatient Medications:   •  albuterol (2 5 mg/3 mL) 0 083 % nebulizer solution, Take 3 mL (2 5 mg total) by nebulization every 6 (six) hours as needed for wheezing or shortness of breath, Disp: 60 mL, Rfl: 1  •  aspirin 81 mg chewable tablet, CHEW ONE TABLET BY MOUTH EVERY DAY, Disp: , Rfl:   •  azithromycin (ZITHROMAX) 500 MG tablet, Take 1 tablet (500 mg total) by mouth 3 (three) times a week (Patient taking differently: Take 500 mg by mouth 3 (three) times a week), Disp: 12 tablet, Rfl: 2  •  Blood Pressure Monitor KIT, Use daily (Patient taking differently: Use 40 mg daily), Disp: 1 kit, Rfl: 0  •  budesonide (PULMICORT) 0 5 mg/2 mL nebulizer solution, Take 2 mL (0 5 mg total) by nebulization every 12 (twelve) hours Rinse mouth after use , Disp: 120 mL, Rfl: 0  •  famotidine (PEPCID) 20 mg tablet, Take 20 mg by mouth daily at bedtime, Disp: , Rfl:   •  fluticasone (FLONASE) 50 mcg/act nasal spray, into each nostril as needed , Disp: , Rfl:   •  furosemide (LASIX) 40 mg tablet, Take 1 tablet (40 mg total) by mouth daily, Disp: 100 tablet, Rfl: 3  •  gabapentin (NEURONTIN) 300 mg capsule, Take 2 capsules (600 mg total) by mouth 2 (two) times a day, Disp: 60 capsule, Rfl: 0  •  potassium chloride (Klor-Con) 10 mEq tablet, Take 2 tablets (20 mEq total) by mouth daily, Disp: 200 tablet, Rfl: 3  •  rosuvastatin (CRESTOR) 40 MG tablet, TAKE 1 TABLET DAILY (Patient taking differently: Take 40 mg by mouth daily at bedtime), Disp: 100 tablet, Rfl: 3  •  Tiotropium Bromide Monohydrate (SPIRIVA RESPIMAT IN), Inhale every morning, Disp: , Rfl:      Labs & Results:    Troponins:    Results from last 7 days   Lab Units 01/18/23  1113 01/18/23  0901 01/18/23  0658   HS TNI 0HR ng/L  --   --  27   HS TNI 2HR ng/L  --  21  --    HSTNI D2 ng/L  --  -6  --    HS TNI 4HR ng/L 20  --   --    HSTNI D4 ng/L -7  --   --         BNP:   Results from last 6 Months   Lab Units 01/18/23  0658 01/09/23  1045   BNP pg/mL 41 61     CBC with diff:   Results from last 7 days   Lab Units 01/19/23  1037 01/18/23  1242 01/18/23  0658   WBC Thousand/uL 6 55  --  9 82   HEMOGLOBIN g/dL 14 6  --  15 5   HEMATOCRIT % 46 0  --  49 1   MCV fL 102*  --  102*   PLATELETS Thousands/uL 163 188 212     TSH:     CMP:   Results from last 7 days   Lab Units 01/19/23  1037 01/18/23  0658   POTASSIUM mmol/L 3 7 3 4*   CHLORIDE mmol/L 100 99   CO2 mmol/L 38* 37*   BUN mg/dL 13 15   CREATININE mg/dL 1 20 1 35*   AST U/L  --  23   ALT U/L  --  17   EGFR ml/min/1 73sq m 57 49     Lipid Profile:     Coags:

## 2023-01-19 NOTE — ASSESSMENT & PLAN NOTE
Lab Results   Component Value Date    EGFR 57 01/19/2023    EGFR 49 01/18/2023    EGFR 67 01/11/2023    CREATININE 1 20 01/19/2023    CREATININE 1 35 (H) 01/18/2023    CREATININE 1 05 01/11/2023   · Creatinine appears to be around baseline   continue routine lab monitoring as outpatient with PCP

## 2023-01-19 NOTE — UTILIZATION REVIEW
Initial Clinical Review    Admission: Date/Time/Statement:   Admission Orders (From admission, onward)     Ordered        01/18/23 1107  Place in Observation  Once                      Orders Placed This Encounter   Procedures   • Place in Observation     Standing Status:   Standing     Number of Occurrences:   1     Order Specific Question:   Level of Care     Answer:   Med Surg [16]     ED Arrival Information     Expected   -    Arrival   1/18/2023 06:34    Acuity   Emergent            Means of arrival   Wheelchair    Escorted by   Family Member    Service   Hospitalist    Admission type   Emergency            Arrival complaint   chest pain           Chief Complaint   Patient presents with   • Chest Pain      Chest pain started at 0550 , 8/10 pain, resolved after nitro , patient has a cardiac hx 10 stents have been placed  Initial Presentation: 65 y o  male to the ED from home with complaints of chest pain, improved after taking nitro at home  Recent COVID + on 1/9/23  H/O COPD, htn, CAD, CKD  Arrives with wheezing, chest pain resolved  CXR shows concern for pneumonia  Cardiology consult  CArdiology: Troponins neg x 2   EF 45 % on recent ECHO in September  Creat mildly elevated, continue to monitor      Date:     Day 2:      ED Triage Vitals [01/18/23 0656]   Temperature Pulse Respirations Blood Pressure SpO2   98 4 °F (36 9 °C) 92 16 118/72 92 %      Temp Source Heart Rate Source Patient Position - Orthostatic VS BP Location FiO2 (%)   Tympanic Monitor Sitting Left arm --      Pain Score       --          Wt Readings from Last 1 Encounters:   01/18/23 94 3 kg (208 lb)     Additional Vital Signs:   Date/Time Temp Pulse Resp BP MAP (mmHg) SpO2 Calculated FIO2 (%) - Nasal Cannula Nasal Cannula O2 Flow Rate (L/min) O2 Device Patient Position - Orthostatic VS   01/19/23 0430 -- 82 17 140/68 90 -- -- -- -- --   01/19/23 0345 -- 78 19 152/78 -- -- -- -- -- --   01/19/23 0315 -- 77 15 -- -- -- -- -- -- -- 01/18/23 2315 -- 91 24 Abnormal  128/63 90 93 % -- -- -- --   01/18/23 2300 -- 93 -- 140/64 92 94 % -- -- -- --   01/18/23 2244 -- 94 -- 139/62 -- -- -- -- -- --   01/18/23 2053 -- -- -- -- -- -- 28 2 L/min   None (Room air) --   Nasal Cannula O2 Flow Rate (L/min): decreased from 4l/m at 01/18/23 2053 01/18/23 1515 -- 85 20 131/66 91 94 % -- -- -- --   01/18/23 1430 -- 91 24 Abnormal  149/70 101 95 % -- -- None (Room air) --   01/18/23 1400 -- 92 18 167/72 104 96 % -- -- -- --   01/18/23 1045 -- 86 18 129/64 91 92 % -- -- -- --   01/18/23 0656 98 4 °F (36 9 °C) 92 16 118/72 -- 92 % 32 3 L/min Nasal cannula Sitting       Pertinent Labs/Diagnostic Test Results:   1/18 EKG: Normal sinus rhythm  Nonspecific ST and T wave abnormality  Prolonged QT  Abnormal ECG  When compared with ECG of 09-JAN-2023 10:23,  No significant change was found  X-ray chest 1 view portable   Final Result by Raymond Espana DO (01/18 9577)   1  Redemonstration of bibasilar airspace opacities which appear similar to most recent radiograph of 1/9/2023  Findings are suspicious for bibasilar pneumonia versus atelectasis  The study was marked in [facility] for immediate notification              Workstation performed: [id number]               Results from last 7 days   Lab Units 01/18/23  1242 01/18/23  0658   WBC Thousand/uL  --  9 82   HEMOGLOBIN g/dL  --  15 5   HEMATOCRIT %  --  49 1   PLATELETS Thousands/uL 188 212   NEUTROS ABS Thousands/µL  --  6 33         Results from last 7 days   Lab Units 01/18/23  0658   SODIUM mmol/L 141   POTASSIUM mmol/L 3 4*   CHLORIDE mmol/L 99   CO2 mmol/L 37*   ANION GAP mmol/L 5   BUN mg/dL 15   CREATININE mg/dL 1 35*   EGFR ml/min/1 73sq m 49   CALCIUM mg/dL 8 9   MAGNESIUM mg/dL 2 2     Results from last 7 days   Lab Units 01/18/23  0658   AST U/L 23   ALT U/L 17   ALK PHOS U/L 73   TOTAL PROTEIN g/dL 6 0*   ALBUMIN g/dL 3 8   TOTAL BILIRUBIN mg/dL 0 87         Results from last 7 days   Lab Units 01/18/23  0658   GLUCOSE RANDOM mg/dL 111       Results from last 7 days   Lab Units 01/18/23  1113 01/18/23  0901 01/18/23  0658   HS TNI 0HR ng/L  --   --  27   HS TNI 2HR ng/L  --  21  --    HSTNI D2 ng/L  --  -6  --    HS TNI 4HR ng/L 20  --   --    HSTNI D4 ng/L -7  --   --        Results from last 7 days   Lab Units 01/18/23  1242   PROCALCITONIN ng/ml 0 10                 Results from last 7 days   Lab Units 01/18/23  0658   BNP pg/mL 41           ED Treatment:   Medication Administration from 01/18/2023 0634 to 01/19/2023 0804       Date/Time Order Dose Route Action Comments     01/18/2023 0810 EST potassium chloride (K-DUR,KLOR-CON) CR tablet 40 mEq 40 mEq Oral Given --     01/18/2023 0908 EST sodium chloride 0 9 % bolus 1,000 mL 0 mL Intravenous Stopped --     01/18/2023 0808 EST sodium chloride 0 9 % bolus 1,000 mL 1,000 mL Intravenous New Bag --     01/18/2023 2244 EST metoprolol succinate (TOPROL-XL) 24 hr tablet 12 5 mg 12 5 mg Oral Given --     01/18/2023 1352 EST metoprolol succinate (TOPROL-XL) 24 hr tablet 12 5 mg 12 5 mg Oral Given --     01/19/2023 0750 EST azithromycin (ZITHROMAX) tablet 500 mg 500 mg Oral Given --     01/18/2023 1234 EST azithromycin (ZITHROMAX) tablet 500 mg 500 mg Oral Given --     01/19/2023 0753 EST budesonide (PULMICORT) inhalation solution 0 5 mg 0 5 mg Nebulization Given --     01/18/2023 2053 EST budesonide (PULMICORT) inhalation solution 0 5 mg 0 5 mg Nebulization Given --     01/18/2023 1235 EST budesonide (PULMICORT) inhalation solution 0 5 mg 0 5 mg Nebulization Given --     01/18/2023 2244 EST famotidine (PEPCID) tablet 20 mg 20 mg Oral Given --     01/19/2023 0749 EST fluticasone (FLONASE) 50 mcg/act nasal spray 1 spray 1 spray Nasal Given --     01/18/2023 1235 EST fluticasone (FLONASE) 50 mcg/act nasal spray 1 spray 1 spray Nasal Given --     01/19/2023 0751 EST furosemide (LASIX) tablet 40 mg 40 mg Oral Given --     01/18/2023 1235 EST furosemide (LASIX) tablet 40 mg 40 mg Oral Given --     01/19/2023 0751 EST gabapentin (NEURONTIN) capsule 600 mg 300 mg Oral Given --     01/18/2023 1910 EST gabapentin (NEURONTIN) capsule 600 mg 600 mg Oral Given --     01/18/2023 1234 EST gabapentin (NEURONTIN) capsule 600 mg 600 mg Oral Given --     01/19/2023 0749 EST potassium chloride oral solution 20 mEq 20 mEq Oral Given --     01/18/2023 1235 EST potassium chloride oral solution 20 mEq 20 mEq Oral Given --     01/18/2023 2248 EST ipratropium (ATROVENT HFA) inhaler 1 puff 1 puff Inhalation Given --     01/18/2023 1355 EST ipratropium (ATROVENT HFA) inhaler 1 puff 1 puff Inhalation Not Given --     01/19/2023 0750 EST aspirin chewable tablet 81 mg 81 mg Oral Given --     01/18/2023 1559 EST atorvastatin (LIPITOR) tablet 80 mg 80 mg Oral Given --     01/19/2023 0752 EST heparin (porcine) subcutaneous injection 5,000 Units 5,000 Units Subcutaneous Not Given --     01/18/2023 2244 EST heparin (porcine) subcutaneous injection 5,000 Units 5,000 Units Subcutaneous Given --     01/18/2023 1559 EST heparin (porcine) subcutaneous injection 5,000 Units 5,000 Units Subcutaneous Given --        Past Medical History:   Diagnosis Date   • Bilateral carotid artery stenosis    • Cancer (HCC)     skin   • Chronic diastolic heart failure (HCC)    • Chronic ischemic heart disease    • Chronic kidney disease     stage 3   • Colon polyp    • COPD (chronic obstructive pulmonary disease) ([facility] )    • Coronary artery disease     hx stents, MI, PCI   • COVID 11/2021   • CPAP (continuous positive airway pressure) dependence    • Hearing loss    • History of transfusion 1995   • Hyperlipidemia    • Hypertension    • MI (myocardial infarction) ([facility] ) 1995   • Myocardial infarction ([facility] ) 1995   • Pneumonia    • Prostate cancer ([facility] )    • RSV (respiratory syncytial virus infection) 10/2022   • S/P carotid endarterectomy    • Shortness of breath     O2 2 l/nc PRN   • Sleep apnea    • Sleep apnea, obstructive    • Stented coronary artery        Admitting Diagnosis: Chest pain [R07 9]  Age/Sex: 65 y o  male  Admission Orders:  Scheduled Medications:  aspirin, 81 mg, Oral, Daily  atorvastatin, 80 mg, Oral, Daily With Dinner  azithromycin, 500 mg, Oral, Once per day on Mon Wed Fri  budesonide, 0 5 mg, Nebulization, Q12H  famotidine, 20 mg, Oral, HS  fluticasone, 1 spray, Nasal, Daily  furosemide, 40 mg, Oral, Daily  gabapentin, 600 mg, Oral, BID  heparin (porcine), 5,000 Units, Subcutaneous, Q8H SVETLANA  ipratropium, 1 puff, Inhalation, Q12H SVETLANA  metoprolol succinate, 12 5 mg, Oral, BID  potassium chloride, 20 mEq, Oral, Daily      Continuous IV Infusions:     PRN Meds:  acetaminophen, 650 mg, Oral, Q4H PRN  albuterol, 2 5 mg, Nebulization, Q6H PRN  ondansetron, 4 mg, Intravenous, Q6H PRN        IP CONSULT TO CARDIOLOGY  IP CONSULT TO CARDIOLOGY    Network Utilization Review Department  ATTENTION: Please call with any questions or concerns to [phone] and carefully listen to the prompts so that you are directed to the right person  All voicemails are confidential   Antan Russell all requests for admission clinical reviews, approved or denied determinations and any other requests to dedicated fax number below belonging to the campus where the patient is receiving treatment   List of dedicated fax numbers for the Facilities:  [address] DENIALS (Administrative/Medical Necessity) [phone]   [address] (Maternity/NICU/Pediatrics) [phone]   [address] Oconnell [phone]   [facility] [phone]   [facility] [phone]   [facility] [address] [place] [address] [place] [address] [address] [phone]

## 2023-01-19 NOTE — PROGRESS NOTES
[facility]  coding opportunities     I13 0 and I42 9     Chart Reviewed number of suggestions sent to Provider: 2     Patients Insurance     Medicare Insurance: [address] [place] [address]

## 2023-01-19 NOTE — ASSESSMENT & PLAN NOTE
-EKG with nonspecific ST abnormalities at baseline  -Troponins negative x3  -Patient without recurrence of symptomatology and was off antianginal therapy at home as beta-blocker has been discontinued but seems to be tolerating well on metoprolol succinate 12 5 mg twice daily  -Will have patient set up for follow-up in the outpatient setting and he will need stress testing prior to undergoing planned operative procedures once stabilized post COVID-19 infection

## 2023-01-19 NOTE — ASSESSMENT & PLAN NOTE
· ACS has been ruled out  · Cardiology input appreciated  · Toprol 12 5 mg twice daily per cardiology  · Continue aspirin/statin/Lasix

## 2023-01-19 NOTE — ASSESSMENT & PLAN NOTE
· Patient reports wears 2 L of oxygen as needed during the day, and wears CPAP at bedtime  · Currently on 2 L of oxygen, pulse ox 94% on same  · Chest x-ray- Redemonstration of bibasilar airspace opacities which appear similar to most recent radiograph of 1/9/2023  Findings are suspicious for bibasilar pneumonia versus atelectasis    · Procalcitonin has been normal  · Continue Zithromax at outpatient dosing for chronic COPD  · Continue home medication regimen

## 2023-01-19 NOTE — ASSESSMENT & PLAN NOTE
- Known LVEF 45% on most recent transthoracic echocardiogram 9/29/2022  -Patient was off medical therapy apart from diuretic therapy in the outpatient setting  -Patient is tolerating metoprolol succinate 12 5 mg twice daily and she will be continued on this in the outpatient setting along with his diuretic and potassium regimen as long as renal function and electrolytes appear stable on morning laboratory studies  -Patient counseled on dietary and lifestyle modifications

## 2023-01-19 NOTE — TELEPHONE ENCOUNTER
Dr Obed Pinto  Patient's [address] for 12/1/2022 was cancelled  Patient rescheduled for 1/31/2023

## 2023-01-19 NOTE — DISCHARGE SUMMARY
Sherry 44  Discharge- Darlen Gear 1945, 65 y o  male MRN: [id number]  Unit/Bed#: ED 28 Encounter: [id number]  Primary Care Provider: Jim Yanez DO   Date and time admitted to hospital: 1/18/2023  6:39 AM    * Chest pain  Assessment & Plan  · Patient was apparently told to discontinue beta-blocker recently  Cardiology recommending continuing Toprol 12 5 mg twice daily  · Troponins have been negative with no significant rise or fall  · No signs of acute ACS  · Continue home statin/aspirin  · Follow-up with cardiology as outpatient for further ischemic testing    COPD (chronic obstructive pulmonary disease) ([facility] [address] )  Assessment & Plan  · Patient reports wears 2 L of oxygen as needed during the day, and wears CPAP at bedtime  · Currently on 2 L of oxygen, pulse ox 94% on same  · Chest x-ray- Redemonstration of bibasilar airspace opacities which appear similar to most recent radiograph of 1/9/2023  Findings are suspicious for bibasilar pneumonia versus atelectasis  · Procalcitonin has been normal  · Continue Zithromax at outpatient dosing for chronic COPD  · Continue home medication regimen      COVID  Assessment & Plan  · Patient noted to be COVID-positive 1/9/2023  · Was admitted to [facility] from 1/9/2023 through 1/11/2023, received remdesivir and IV Decadron while inpatient  · Wears oxygen at 2 L nasal cannula as needed during the day, currently on 2 L nasal cannula with oxygen saturation greater than 90%  · No further work-up/treatment at this time    Stage 3a chronic kidney disease [facility])  Assessment & Plan  Lab Results   Component Value Date    EGFR 57 01/19/2023    EGFR 49 01/18/2023    EGFR 67 01/11/2023    CREATININE 1 20 01/19/2023    CREATININE 1 35 (H) 01/18/2023    CREATININE 1 05 01/11/2023   · Creatinine appears to be around baseline  continue routine lab monitoring as outpatient with PCP    Hypertension  Assessment & Plan  · BP currently stable    Continue home medications    Coronary artery disease involving native coronary artery of native heart without angina pectoris  Assessment & Plan  · ACS has been ruled out  · Cardiology input appreciated  · Toprol 12 5 mg twice daily per cardiology  · Continue aspirin/statin/Lasix      Discharging Physician / Practitioner: Tad Fischer MD  PCP: Echo Macario DO  Admission Date:   Admission Orders (From admission, onward)     Ordered        01/18/23 1107  Place in Observation  Once                      Discharge Date: 01/19/23    Medical Problems     Resolved Problems  Date Reviewed: 1/18/2023   None         Consultations During Hospital Stay:  · Cardiology    Procedures Performed:   · None    Significant Findings / Test Results:   · Chest pain    Incidental Findings:   · None    Test Results Pending at Discharge (will require follow up): · None     Outpatient Tests Requested:  · Routine labs with PCP as outpatient    Complications:    • None    Reason for Admission: Chest pain    Hospital Course:     Kenya Hudson is a 65 y o  male patient who originally presented to the hospital on 1/18/2023 due to chest pain  Troponins have been negative with no significant rise or fall  Cardiology consulted  Possibly secondary to discontinuing of metoprolol recently  Cardiology recommended continuing Toprol 12 5 mg twice daily  Chest pain has improved  Cardiology recommending outpatient follow-up for ischemic testing  Patient discharged home with outpatient follow-up    Please see above list of diagnoses and related plan for additional information       Condition at Discharge: stable     Discharge Day Visit / Exam:     Subjective: No complaints at this time  Vitals: Blood Pressure: 112/56 (01/19/23 1200)  Pulse: 71 (01/19/23 1215)  Temperature: 98 4 °F (36 9 °C) (01/18/23 0656)  Temp Source: Tympanic (01/18/23 0656)  Respirations: 14 (01/19/23 1215)  Weight - Scale: 94 3 kg (208 lb) (01/18/23 0656)  SpO2: 92 % (01/19/23 1215)  Exam:   Physical Exam  Constitutional:       General: He is not in acute distress  Appearance: He is obese  HENT:      Head: Normocephalic and atraumatic  Nose: Nose normal       Mouth/Throat:      Mouth: Mucous membranes are moist    Eyes:      Extraocular Movements: Extraocular movements intact  Conjunctiva/sclera: Conjunctivae normal    Cardiovascular:      Rate and Rhythm: Normal rate and regular rhythm  Pulmonary:      Effort: Pulmonary effort is normal  No respiratory distress  Abdominal:      Palpations: Abdomen is soft  Tenderness: There is no abdominal tenderness  Musculoskeletal:         General: Normal range of motion  Cervical back: Normal range of motion and neck supple  Skin:     General: Skin is warm and dry  Neurological:      General: No focal deficit present  Mental Status: He is alert  Mental status is at baseline  Cranial Nerves: No cranial nerve deficit  Psychiatric:         Mood and Affect: Mood normal          Behavior: Behavior normal          Discussion with Family: Updated patient regarding discharge plan    Discharge instructions/Information to patient and family:   See after visit summary for information provided to patient and family  Provisions for Follow-Up Care:  See after visit summary for information related to follow-up care and any pertinent home health orders  Disposition:     Home      Planned Readmission:   • no     Discharge Statement:  I spent 35 minutes discharging the patient  This time was spent on the day of discharge  I had direct contact with the patient on the day of discharge  Greater than 50% of the total time was spent examining patient, answering all patient questions, arranging and discussing plan of care with patient as well as directly providing post-discharge instructions  Additional time then spent on discharge activities      Discharge Medications:  See after visit summary for reconciled discharge medications provided to patient and family        ** Please Note: This note has been constructed using a voice recognition system **

## 2023-01-19 NOTE — ASSESSMENT & PLAN NOTE
· Patient was apparently told to discontinue beta-blocker recently    Cardiology recommending continuing Toprol 12 5 mg twice daily  · Troponins have been negative with no significant rise or fall  · No signs of acute ACS  · Continue home statin/aspirin  · Follow-up with cardiology as outpatient for further ischemic testing

## 2023-01-19 NOTE — TELEPHONE ENCOUNTER
I spoke to cristian, his spaceOAR procedure 12/1 was canceled due to covid  So it was rescheduled to 1/31 but I told cristian not to get the procedure done per dr Paul Ruano until he is seen and cleared by cardiology due to his visit hospital visit

## 2023-01-19 NOTE — ED NOTES
Pt ambulated to the bathroom without assistance; pt with no needs; will continue to monitor     Xavier Carias RN  01/19/23 3388

## 2023-01-19 NOTE — TELEPHONE ENCOUNTER
I did try to reach urology to inform them but there was a long line to speak to someone  Had to hang up before I got a hold of anyone  I will route this message to one of the MA's listed on his chart

## 2023-01-19 NOTE — ASSESSMENT & PLAN NOTE
-Patient has been chest pain-free since admission  -Would continue with aspirin and Crestor  -Patient will need outpatient ischemic evaluation once more stable from Alycia standpoint

## 2023-01-19 NOTE — TELEPHONE ENCOUNTER
I reviewed his chart-the patient underwent SpaceOAR and gold fiducial markers in preparation for radiation in November 2022  I am unaware of any operative procedure that he is to undergo  He should have completed radiation by now  To my staff, please contact the patient to make sure he has follow-up with us in May or June with a PSA  If he has any other pressing issues, we can see him sooner

## 2023-01-19 NOTE — TELEPHONE ENCOUNTER
----- Message from Tiki Borges DO sent at 1/19/2023 11:04 AM EST -----  Please contact patient's urology team and let them know he will need to be seen and evaluated and have stress testing prior to undergoing operative procedures as he was hospitalized for chest discomfort 1/18/2023

## 2023-01-19 NOTE — ED NOTES
Pt  Janene Jade and tolerated well to bathroom  Declines urinal, stating that he wants to get out of bed to use toilet       Arie Van, LEYDA  01/19/23 4634

## 2023-01-20 ENCOUNTER — TRANSITIONAL CARE MANAGEMENT (OUTPATIENT)
Dept: FAMILY MEDICINE CLINIC | Facility: CLINIC | Age: 78
End: 2023-01-20

## 2023-01-20 ENCOUNTER — TELEPHONE (OUTPATIENT)
Dept: FAMILY MEDICINE CLINIC | Facility: CLINIC | Age: 78
End: 2023-01-20

## 2023-01-20 ENCOUNTER — DOCUMENTATION (OUTPATIENT)
Dept: HEMATOLOGY ONCOLOGY | Facility: CLINIC | Age: 78
End: 2023-01-20

## 2023-01-20 VITALS
DIASTOLIC BLOOD PRESSURE: 76 MMHG | TEMPERATURE: 97.2 F | OXYGEN SATURATION: 90 % | SYSTOLIC BLOOD PRESSURE: 124 MMHG | RESPIRATION RATE: 20 BRPM | HEART RATE: 94 BPM

## 2023-01-20 DIAGNOSIS — K21.9 GASTROESOPHAGEAL REFLUX DISEASE, UNSPECIFIED WHETHER ESOPHAGITIS PRESENT: Primary | Chronic | ICD-10-CM

## 2023-01-20 RX ORDER — FAMOTIDINE 20 MG/1
20 TABLET, FILM COATED ORAL
Qty: 30 TABLET | Refills: 1 | Status: SHIPPED | OUTPATIENT
Start: 2023-01-20 | End: 2023-01-23 | Stop reason: SDUPTHER

## 2023-01-20 NOTE — TELEPHONE ENCOUNTER
I believe the Atrovent is just PRN, but I would ask him to call his pulmonologist as well to double check  Thanks!

## 2023-01-20 NOTE — UTILIZATION REVIEW
NOTIFICATION OF OBSERVATION ADMISSION   AUTHORIZATION REQUEST   SERVICING FACILITY:   [facility]  [address] [facility] [place], [address]  Tax ID: [id number]  NPI: [id number] ATTENDING PROVIDER:  Attending Name and NPI#: Eulalio Grossman [facility] [[id number]]  Address: [address] [facility] [place], [address]  Phone: [phone]     ADMISSION INFORMATION:  Place of Service: On [address] Code: 22 CPT Code:   Admitting Diagnosis Code/Description:  Chest pain [R07 9]  Observation Admission Date/Time: 01/18/2023 11:07AM  Discharge Date/Time: 1/19/2023  3:30 PM     UTILIZATION REVIEW CONTACT:  Dario Henao Utilization   Network Utilization Review Department  Phone: [phone]  Fax [phone]  Email: [email or web address]  Contact for approvals/pending authorizations, clinical reviews, and discharge  PHYSICIAN ADVISORY SERVICES:  Medical Necessity Denial & [employer] Review  Phone: [phone]  Fax: [phone]  Email: [email or web address]

## 2023-01-20 NOTE — TELEPHONE ENCOUNTER
Renata Rdz is asking if he is to take the spriva resp,inhaler and the Atrovent (ipratropium) also in the morning????   The extra was sent home with him from the hospital   It has been crossed off the list    Please advise    Phone: [phone]

## 2023-01-20 NOTE — PROGRESS NOTES
Per Cardiology patient needs full work up prior to getting SpaceOAR/Markers and RT  Urology surgery scheduler canceled SpaceOAR/Markers  Message was sent to RAD ONC RN to cancel SIM

## 2023-01-20 NOTE — TELEPHONE ENCOUNTER
I spoke to Mr Micheal Jordan to confirm  Case canceled  Message sent to [facility] to update

## 2023-01-23 DIAGNOSIS — K21.9 GASTROESOPHAGEAL REFLUX DISEASE, UNSPECIFIED WHETHER ESOPHAGITIS PRESENT: Chronic | ICD-10-CM

## 2023-01-23 RX ORDER — FAMOTIDINE 20 MG/1
20 TABLET, FILM COATED ORAL
Qty: 100 TABLET | Refills: 1 | Status: SHIPPED | OUTPATIENT
Start: 2023-01-23 | End: 2023-01-25 | Stop reason: SDUPTHER

## 2023-01-23 NOTE — TELEPHONE ENCOUNTER
Regarding: Yulisa's Metformin 500mg  Contact: [phone]  ----- Message from Cedric Morales DO sent at 1/23/2023  7:37 AM EST -----  Sorry it's Shlomo marcin but with mail order 100 day supply with below info      ----- Message sent from Cedric Morales DO to Ayden Rivera at 1/23/2023  7:26 AM -----   Hi Shlomo,     Let's discuss the plan for Yulisa's metformin when I see you both on the 25th  We'll take care of the prescription for you  Thanks! Dr Andreas Meeks       ----- Message -----       From:Seun Trejo       Sent:1/20/2023  4:21 PM EST         To:Patient Medical Advice Request Message List    Subject:Yulisa's Metformin 500mg    They have to set up electronic filing with the provider       ----- Message -----       Laura Figueredo       Sent:1/20/2023  4:19 PM EST         To:Patient Medical Advice Request Message List    Subject:Yulisa's Metformin 500mg    Based upon my phone call to [facility] the mail in perscription for 100 days is the following address for providersand you also have a provider phone number for any questions  Provider Phone # [phone],    [address]      ----- Message -----       From:Seun Trejo       Sent:1/20/2023  3:24 PM EST         To:Patient Medical Advice Request Message List    Subject:Yulisa's Metformin 500mg    Hi, Yulisa's Graylon Pedlar has decreased since she stopped taking the metformin so that is a plus   Larraine [place]      ----- Message -----       From:Sarahy Meeks       Sent:1/20/2023  7:07 AM EST         To:Seun Alva    Subject:Yulisa's Metformin 500mg    Good morning Shlomo,     I've sent in the refill for you  Have you noticed any change in Yulisa's diarrhea with stopping the metformin? Thanks!      Dr Andreas Meeks       ----- Message -----       From:Seun Trejo       Sent:1/19/2023  8:21 PM EST         To:Sarahy Meeks    Subject:Yulisa's Metformin 500mg    Just wondering if [place] [facility] should start takinf this med again 1 tab twice a day? Please advise, Thanks  Also i am out ofFamotidine tabs, 20 mg to take 1 daily, please send to [facility] on [address] and I will get the [facility] mail in address to your MA for yhe 90 day supply   Thanks , Latrice Good

## 2023-01-24 ENCOUNTER — HOME CARE VISIT (OUTPATIENT)
Dept: HOME HEALTH SERVICES | Facility: HOME HEALTHCARE | Age: 78
End: 2023-01-24

## 2023-01-25 ENCOUNTER — TELEPHONE (OUTPATIENT)
Dept: FAMILY MEDICINE CLINIC | Facility: CLINIC | Age: 78
End: 2023-01-25

## 2023-01-25 ENCOUNTER — HOSPITAL ENCOUNTER (OUTPATIENT)
Dept: NON INVASIVE DIAGNOSTICS | Facility: HOSPITAL | Age: 78
Discharge: HOME | End: 2023-01-25

## 2023-01-25 ENCOUNTER — HOSPITAL ENCOUNTER (OUTPATIENT)
Dept: RADIOLOGY | Facility: HOSPITAL | Age: 78
End: 2023-01-25

## 2023-01-25 ENCOUNTER — OFFICE VISIT (OUTPATIENT)
Dept: FAMILY MEDICINE CLINIC | Facility: CLINIC | Age: 78
End: 2023-01-25

## 2023-01-25 VITALS
SYSTOLIC BLOOD PRESSURE: 118 MMHG | TEMPERATURE: 97.8 F | HEART RATE: 87 BPM | OXYGEN SATURATION: 82 % | HEIGHT: 68 IN | RESPIRATION RATE: 17 BRPM | WEIGHT: 209 LBS | DIASTOLIC BLOOD PRESSURE: 68 MMHG | BODY MASS INDEX: 31.67 KG/M2

## 2023-01-25 VITALS
TEMPERATURE: 97.6 F | DIASTOLIC BLOOD PRESSURE: 60 MMHG | RESPIRATION RATE: 20 BRPM | SYSTOLIC BLOOD PRESSURE: 124 MMHG | HEART RATE: 60 BPM | OXYGEN SATURATION: 92 %

## 2023-01-25 DIAGNOSIS — M79.605 LEFT LEG PAIN: ICD-10-CM

## 2023-01-25 DIAGNOSIS — I10 PRIMARY HYPERTENSION: Chronic | ICD-10-CM

## 2023-01-25 DIAGNOSIS — R71.8 ELEVATED MCV: ICD-10-CM

## 2023-01-25 DIAGNOSIS — R73.03 PRE-DIABETES: ICD-10-CM

## 2023-01-25 DIAGNOSIS — R20.8 OTHER DISTURBANCES OF SKIN SENSATION: ICD-10-CM

## 2023-01-25 DIAGNOSIS — R05.1 ACUTE COUGH: Primary | ICD-10-CM

## 2023-01-25 DIAGNOSIS — J96.11 CHRONIC RESPIRATORY FAILURE WITH HYPOXIA (HCC): ICD-10-CM

## 2023-01-25 DIAGNOSIS — I65.23 BILATERAL CAROTID ARTERY STENOSIS: ICD-10-CM

## 2023-01-25 DIAGNOSIS — N18.31 STAGE 3A CHRONIC KIDNEY DISEASE (HCC): Chronic | ICD-10-CM

## 2023-01-25 DIAGNOSIS — R29.898 TRANSIENT RIGHT LEG WEAKNESS: ICD-10-CM

## 2023-01-25 DIAGNOSIS — K21.9 GASTROESOPHAGEAL REFLUX DISEASE, UNSPECIFIED WHETHER ESOPHAGITIS PRESENT: Chronic | ICD-10-CM

## 2023-01-25 DIAGNOSIS — J44.1 CHRONIC OBSTRUCTIVE PULMONARY DISEASE WITH ACUTE EXACERBATION (HCC): ICD-10-CM

## 2023-01-25 DIAGNOSIS — F33.9 DEPRESSION, RECURRENT (HCC): ICD-10-CM

## 2023-01-25 DIAGNOSIS — I25.10 CORONARY ARTERY DISEASE INVOLVING NATIVE CORONARY ARTERY OF NATIVE HEART WITHOUT ANGINA PECTORIS: Chronic | ICD-10-CM

## 2023-01-25 RX ORDER — DOXYCYCLINE HYCLATE 100 MG/1
100 CAPSULE ORAL EVERY 12 HOURS SCHEDULED
Qty: 14 CAPSULE | Refills: 0 | Status: SHIPPED | OUTPATIENT
Start: 2023-01-25 | End: 2023-02-01

## 2023-01-25 RX ORDER — FAMOTIDINE 20 MG/1
20 TABLET, FILM COATED ORAL
Qty: 100 TABLET | Refills: 1 | Status: SHIPPED | OUTPATIENT
Start: 2023-01-25

## 2023-01-25 RX ORDER — BENZONATATE 200 MG/1
200 CAPSULE ORAL 3 TIMES DAILY PRN
Qty: 20 CAPSULE | Refills: 0 | Status: SHIPPED | OUTPATIENT
Start: 2023-01-25

## 2023-01-25 RX ORDER — BUDESONIDE 0.5 MG/2ML
0.5 INHALANT ORAL
Qty: 120 ML | Refills: 0 | Status: SHIPPED | OUTPATIENT
Start: 2023-01-25

## 2023-01-25 NOTE — PROGRESS NOTES
Russell Hastings 1945 male MRN: [id number]    Family Medicine Transition of Care Visit      SUBJECTIVE    CC: AGUDELO DeweyS Visit     Transitional Care Management Review:  Russell Hastings is a 65 y o  male here for TCM follow up  He was admitted 1/18-1/19 for chest pain  Hospital course as per discharge summary as below:    "Chest pain  Assessment & Plan  • Patient was apparently told to discontinue beta-blocker recently  Cardiology recommending continuing Toprol 12 5 mg twice daily  • Troponins have been negative with no significant rise or fall  • No signs of acute ACS  • Continue home statin/aspirin  • Follow-up with cardiology as outpatient for further ischemic testing    Follow-up with cardiology scheduled 2/3     COPD (chronic obstructive pulmonary disease) ([facility] )  Assessment & Plan  • Patient reports wears 2 L of oxygen as needed during the day, and wears CPAP at bedtime  • Currently on 2 L of oxygen, pulse ox 94% on same  • Chest x-ray- Redemonstration of bibasilar airspace opacities which appear similar to most recent radiograph of 1/9/2023   Findings are suspicious for bibasilar pneumonia versus atelectasis  ? Procalcitonin has been normal  • Continue Zithromax at outpatient dosing for chronic COPD  • Continue home medication regimen     On baseline 2L NC       COVID  Assessment & Plan  • Patient noted to be COVID-positive 1/9/2023  • Was admitted to [address] [place] [address] from 1/9/2023 through 1/11/2023, received remdesivir and IV Decadron while inpatient  • Wears oxygen at 2 L nasal cannula as needed during the day, currently on 2 L nasal cannula with oxygen saturation greater than 90%  • No further work-up/treatment at this time    Since hospital discharge, has had worsening cough with productive yellow/green sputum     Recommended doxycyline 100 mg BID 7 days and Tessalon PRN      Stage 3a chronic kidney disease ([facility] )  Assessment & Plan        • Lab Results   • Component • Value • Date   •   • EGFR • 62 • 01/19/2023   •   • EGFR • 49 • 01/18/2023   •   • EGFR • 67 • 01/11/2023   •   • CREATININE • 1 20 • 01/19/2023   •   • CREATININE • 1 35 (H) • 01/18/2023   •   • CREATININE • 1 05 • 01/11/2023   • Creatinine appears to be around baseline  continue routine lab monitoring as outpatient with PCP    Blood work ordered       Hypertension  Assessment & Plan  • BP currently stable  Continue home medications     Coronary artery disease involving native coronary artery of native heart without angina pectoris  Assessment & Plan  • ACS has been ruled out  • Cardiology input appreciated  • Toprol 12 5 mg twice daily per cardiology  • Continue aspirin/statin/Lasix"     "Braydon Valderrama is a 65 y o  male patient who originally presented to the hospital on 1/18/2023 due to chest pain  Troponins have been negative with no significant rise or fall  Cardiology consulted  Possibly secondary to discontinuing of metoprolol recently  Cardiology recommended continuing Toprol 12 5 mg twice daily  Chest pain has improved  Cardiology recommending outpatient follow-up for ischemic testing  Patient discharged home with outpatient follow-up "      During the TCM phone call patient stated:    TCM Call     Date and time call was made  1/20/2023 10:42 AM    Patient was hospitialized at  [address]    Date of Admission  01/18/23    Date of discharge  01/19/23    Diagnosis  chest pain    Disposition  Home    Current Symptoms  None    Cough Severity  Moderate    Shortness of breath severity  Moderate      TCM Call     Post hospital issues  --  question on medication, will send message to Dr Guillermina Nolan    Scheduled for follow up?   Yes    Did you obtain your prescribed medications  Yes    Do you need help managing your prescriptions or medications  No    Is transportation to your appointment needed  No    I have advised the patient to call PCP with any new or worsening symptoms  Colette Macey     Living Arrangements  Spouse or Significiant other    Support System  Spouse    The type of support provided  Emotional; Physical; Other (comment)    Do you have social support  Yes, as much as I need          During today's visit:     Left leg pain- The day he left the hospital this pain started up, constant pain, not very painful, more an ache, sometimes resolves, otherwise leg appears to be normal no lumps, swelling, redness  Received from visiting nurse- "On today's nursing visit, he complained of intermittent left calf pain, achy in nature, since he was in the ER last week and is concerned about possible blood clot, especially since he was just in the hospital for COVID and in the ER for chest pain  I did not feel any raised areas, and no edema or redness or warmth noted  Of note, he did refuse some of his DVT prevention injections while in the hospital  Just letting you know as he and his wife have appts with you tomorrow  "    Of note, at the end of the visit he also mentions episodes of transient right upper and lower extremity weakness  No trigger, suddenly will experience weakness right side and have to hold onto something to catch himself  4 episodes total, last episode yesterday  Asymptomatic currently  They are complying with their medication changes and discharge instructions  They have the following questions: As above     Review of Systems   All other systems reviewed and are negative        Historical Information     The patient history was reviewed as follows:    Past Medical History:   Diagnosis Date   • Bilateral carotid artery stenosis    • Cancer (HCC)     skin   • Chronic diastolic heart failure (HCC)    • Chronic ischemic heart disease    • Chronic kidney disease     stage 3   • Colon polyp    • COPD (chronic obstructive pulmonary disease) (HCC)    • Coronary artery disease     hx stents, MI, PCI   • COVID 11/2021   • CPAP (continuous positive airway pressure) dependence    • Hearing loss    • History of transfusion 1995   • Hyperlipidemia    • Hypertension    • MI (myocardial infarction) ([facility] ) 1995   • Myocardial infarction ([facility] ) 1995   • Pneumonia    • Prostate cancer ([facility] )    • RSV (respiratory syncytial virus infection) 10/2022   • S/P carotid endarterectomy    • Shortness of breath     O2 2 l/nc PRN   • Sleep apnea    • Sleep apnea, obstructive    • Stented coronary artery      Past Surgical History:   Procedure Laterality Date   • APPENDECTOMY     • CARDIAC CATHETERIZATION  03/18/2021    left main with no significant disease, proximal LAD with 10% stenosis at the site of prior stent, left circumflex artery with minimal luminal irregularities mid RCA with 50% stenosis at site of prior stent and PL segment with distal disease supplied by collaterals from the distal circumflex with no significant CAD requiring revascularization at that time     • CATARACT EXTRACTION, BILATERAL Bilateral    • COLONOSCOPY     • CORONARY ANGIOPLASTY WITH STENT PLACEMENT  1999    RCA   • CORONARY ANGIOPLASTY WITH STENT PLACEMENT  2001    RCA   • CORONARY ANGIOPLASTY WITH STENT PLACEMENT  2003    LAD   • EYE SURGERY     • OH BX PROSTATE STRTCTC SATURATION SAMPLING IMG GID N/A 09/13/2022    Procedure: TRANSPERINEAL MRI FUSION  BIOPSY PROSTATE;  Surgeon: Selma Oneil MD;  Location: [facility];  Service: Urology   • OH NEUROPLASTY &/TRANSPOS MEDIAN NRV CARPAL [place] Left 07/22/2022    Procedure: RELEASE CARPAL TUNNEL;  Surgeon: Jose Gaffney MD;  Location: [facility];  Service: Orthopedics   • OH NEUROPLASTY &/TRANSPOSITION ULNAR NERVE ELBOW Left 07/22/2022    Procedure: RELEASE CUBITAL TUNNEL;  Surgeon: Jose Gaffney MD;  Location[facility];  Service: Orthopedics   • OH NEUROPLASTY &/TRANSPOSITION ULNAR NERVE WRIST Left 07/22/2022    Procedure: Susie Brooks RELEASE;  Surgeon: Jose Gaffney MD;  Location: [facility];  Service: Orthopedics   • SKIN CANCER EXCISION  2012    chin-per pt, basal cell  also right ankle     Family History Problem Relation Age of Onset   • Heart attack Mother    • Dementia Mother    • No Known Problems Father       Social History   Social History     Substance and Sexual Activity   Alcohol Use Not Currently     Social History     Substance and Sexual Activity   Drug Use Never     Social History     Tobacco Use   Smoking Status Former   • Packs/day: 2 00   • Years: 35 00   • Pack years: 70 00   • Types: Cigarettes   • Quit date: 1995   • Years since quittin 5   Smokeless Tobacco Never       Medications:   Meds/Allergies     Current Outpatient Medications:   •  albuterol (2 5 mg/3 mL) 0 083 % nebulizer solution, Take 3 mL (2 5 mg total) by nebulization every 6 (six) hours as needed for wheezing or shortness of breath, Disp: 60 mL, Rfl: 1  •  aspirin 81 mg chewable tablet, CHEW ONE TABLET BY MOUTH EVERY DAY, Disp: , Rfl:   •  azithromycin (ZITHROMAX) 500 MG tablet, Take 1 tablet (500 mg total) by mouth 3 (three) times a week (Patient taking differently: Take 500 mg by mouth 3 (three) times a week), Disp: 12 tablet, Rfl: 2  •  benzonatate (TESSALON) 200 MG capsule, Take 1 capsule (200 mg total) by mouth 3 (three) times a day as needed for cough, Disp: 20 capsule, Rfl: 0  •  Blood Pressure Monitor KIT, Use daily (Patient taking differently: Use 40 mg daily), Disp: 1 kit, Rfl: 0  •  budesonide (PULMICORT) 0 5 mg/2 mL nebulizer solution, Take 2 mL (0 5 mg total) by nebulization every 12 (twelve) hours Rinse mouth after use , Disp: 120 mL, Rfl: 0  •  doxycycline hyclate (VIBRAMYCIN) 100 mg capsule, Take 1 capsule (100 mg total) by mouth every 12 (twelve) hours for 7 days, Disp: 14 capsule, Rfl: 0  •  famotidine (PEPCID) 20 mg tablet, Take 1 tablet (20 mg total) by mouth daily at bedtime, Disp: 100 tablet, Rfl: 1  •  fluticasone (FLONASE) 50 mcg/act nasal spray, into each nostril as needed , Disp: , Rfl:   •  furosemide (LASIX) 40 mg tablet, Take 1 tablet (40 mg total) by mouth daily, Disp: 100 tablet, Rfl: 3  • gabapentin (NEURONTIN) 300 mg capsule, Take 2 capsules (600 mg total) by mouth 2 (two) times a day, Disp: 60 capsule, Rfl: 0  •  metoprolol succinate (TOPROL-XL) 25 mg 24 hr tablet, Take 0 5 tablets (12 5 mg total) by mouth 2 (two) times a day, Disp: 30 tablet, Rfl: 0  •  oxygen gas, Inhale 2 L/min continuous  Indications: SOB, pulmonary edema suspected, Disp: , Rfl:   •  potassium chloride (Klor-Con) 10 mEq tablet, Take 2 tablets (20 mEq total) by mouth daily, Disp: 200 tablet, Rfl: 3  •  rosuvastatin (CRESTOR) 40 MG tablet, TAKE 1 TABLET DAILY (Patient taking differently: Take 40 mg by mouth daily at bedtime), Disp: 100 tablet, Rfl: 3  •  Tiotropium Bromide Monohydrate (SPIRIVA RESPIMAT IN), Inhale every morning, Disp: , Rfl:   Allergies   Allergen Reactions   • Lisinopril Swelling and Cough   • Tetanus Antitoxin Anaphylaxis   • Tetanus Toxoid Anaphylaxis and Swelling   • Pneumococcal Vac Polyvalent Fever and Tremor       OBJECTIVE    Vitals:   Vitals:    01/25/23 1349   BP: 118/68   Pulse: 87   Resp: 17   Temp: 97 8 °F (36 6 °C)   SpO2: (!) 82%   Weight: 94 8 kg (209 lb)   Height: 5' 8" (1 727 m)       Body mass index is 31 78 kg/m²  Physical Exam:    Physical Exam  Vitals reviewed  Constitutional:       General: He is not in acute distress  Appearance: Normal appearance  He is not ill-appearing, toxic-appearing or diaphoretic  HENT:      Head: Normocephalic and atraumatic  Eyes:      General:         Right eye: No discharge  Left eye: No discharge  Extraocular Movements: Extraocular movements intact  Conjunctiva/sclera: Conjunctivae normal    Cardiovascular:      Rate and Rhythm: Normal rate and regular rhythm  Heart sounds: Normal heart sounds  No murmur heard  No friction rub  No gallop  Pulmonary:      Effort: Pulmonary effort is normal  No respiratory distress  Breath sounds: No stridor  No wheezing or rhonchi        Comments: Right basilar crackles Musculoskeletal:         General: No swelling, tenderness or signs of injury  Right lower leg: Edema present  Left lower leg: Edema present  Comments: Trace pitting edema bilaterally    Skin:     General: Skin is warm  Coloration: Skin is not pale  Findings: No erythema or rash  Neurological:      Mental Status: He is alert and oriented to person, place, and time  Motor: No weakness  Psychiatric:         Mood and Affect: Mood normal          Behavior: Behavior normal           Labs: I have personally reviewed all pertinent results       Admission on 01/18/2023, Discharged on 01/19/2023   Component Date Value Ref Range Status   • WBC 01/18/2023 9 82  4 31 - 10 16 Thousand/uL Final   • RBC 01/18/2023 4 81  3 88 - 5 62 Million/uL Final   • Hemoglobin 01/18/2023 15 5  12 0 - 17 0 g/dL Final   • Hematocrit 01/18/2023 49 1  36 5 - 49 3 % Final   • MCV 01/18/2023 102 (H)  82 - 98 fL Final   • MCH 01/18/2023 32 2  26 8 - 34 3 pg Final   • MCHC 01/18/2023 31 6  31 4 - 37 4 g/dL Final   • RDW 01/18/2023 14 8  11 6 - 15 1 % Final   • MPV 01/18/2023 10 8  8 9 - 12 7 fL Final   • Platelets 18/76/6516 212  149 - 390 Thousands/uL Final   • nRBC 01/18/2023 0  /100 WBCs Final   • Neutrophils Relative 01/18/2023 63  43 - 75 % Final   • Immat GRANS % 01/18/2023 1  0 - 2 % Final   • Lymphocytes Relative 01/18/2023 18  14 - 44 % Final   • Monocytes Relative 01/18/2023 13 (H)  4 - 12 % Final   • Eosinophils Relative 01/18/2023 4  0 - 6 % Final   • Basophils Relative 01/18/2023 1  0 - 1 % Final   • Neutrophils Absolute 01/18/2023 6 33  1 85 - 7 62 Thousands/µL Final   • Immature Grans Absolute 01/18/2023 0 07  0 00 - 0 20 Thousand/uL Final   • Lymphocytes Absolute 01/18/2023 1 73  0 60 - 4 47 Thousands/µL Final   • Monocytes Absolute 01/18/2023 1 27 (H)  0 17 - 1 22 Thousand/µL Final   • Eosinophils Absolute 01/18/2023 0 36  0 00 - 0 61 Thousand/µL Final   • Basophils Absolute 01/18/2023 0 06  0 00 - 0 10 Thousands/µL Final   • Sodium 01/18/2023 141  135 - 147 mmol/L Final   • Potassium 01/18/2023 3 4 (L)  3 5 - 5 3 mmol/L Final   • Chloride 01/18/2023 99  96 - 108 mmol/L Final   • CO2 01/18/2023 37 (H)  21 - 32 mmol/L Final   • ANION GAP 01/18/2023 5  4 - 13 mmol/L Final   • BUN 01/18/2023 15  5 - 25 mg/dL Final   • Creatinine 01/18/2023 1 35 (H)  0 60 - 1 30 mg/dL Final    Standardized to IDMS reference method   • Glucose 01/18/2023 111  65 - 140 mg/dL Final    If the patient is fasting, the ADA then defines impaired fasting glucose as > 100 mg/dL and diabetes as > or equal to 123 mg/dL  Specimen collection should occur prior to Sulfasalazine administration due to the potential for falsely depressed results  Specimen collection should occur prior to Sulfapyridine administration due to the potential for falsely elevated results  • Calcium 01/18/2023 8 9  8 4 - 10 2 mg/dL Final   • AST 01/18/2023 23  13 - 39 U/L Final    Specimen collection should occur prior to Sulfasalazine administration due to the potential for falsely depressed results  • ALT 01/18/2023 17  7 - 52 U/L Final    Specimen collection should occur prior to Sulfasalazine administration due to the potential for falsely depressed results  • Alkaline Phosphatase 01/18/2023 73  34 - 104 U/L Final   • Total Protein 01/18/2023 6 0 (L)  6 4 - 8 4 g/dL Final   • Albumin 01/18/2023 3 8  3 5 - 5 0 g/dL Final   • Total Bilirubin 01/18/2023 0 87  0 20 - 1 00 mg/dL Final   • eGFR 01/18/2023 49  ml/min/1 73sq m Final   • Magnesium 01/18/2023 2 2  1 9 - 2 7 mg/dL Final   • BNP 01/18/2023 41  0 - 100 pg/mL Final   • hs TnI 0hr 01/18/2023 27  "Refer to ACS Flowchart"- see link ng/L Final    Comment:                                              Initial (time 0) result  If >=50 ng/L, Myocardial injury suggested ;  Type of myocardial injury and treatment strategy  to be determined    If 5-49 ng/L, a delta result at 2 hours and or 4 hours will be needed to further evaluate  If <4 ng/L, and chest pain has been >3 hours since onset, patient may qualify for discharge based on the HEART score in the ED  If <5 ng/L and <3hours since onset of chest pain, a delta result at 2 hours will be needed to further evaluate  HS Troponin 99th Percentile URL of a Health Population=12 ng/L with a 95% Confidence Interval of 8-18 ng/L  Second Troponin (time 2 hours)  If calculated delta >= 20 ng/L,  Myocardial injury suggested ; Type of myocardial injury and treatment strategy to be determined  If 5-49 ng/L and the calculated delta is 5-19 ng/L, consult medical service for evaluation  Continue evaluation for ischemia on ecg and other possible etiology and repeat hs troponin at 4 hours  If delta                            is <5 ng/L at 2 hours, consider discharge based on risk stratification via the HEART score (if in ED), or LARON risk score in IP/Observation  HS Troponin 99th Percentile URL of a Health Population=12 ng/L with a 95% Confidence Interval of 8-18 ng/L  • hs TnI 2hr 01/18/2023 21  "Refer to ACS Flowchart"- see link ng/L Final    Comment:                                              Initial (time 0) result  If >=50 ng/L, Myocardial injury suggested ;  Type of myocardial injury and treatment strategy  to be determined  If 5-49 ng/L, a delta result at 2 hours and or 4 hours will be needed to further evaluate  If <4 ng/L, and chest pain has been >3 hours since onset, patient may qualify for discharge based on the HEART score in the ED  If <5 ng/L and <3hours since onset of chest pain, a delta result at 2 hours will be needed to further evaluate  HS Troponin 99th Percentile URL of a Health Population=12 ng/L with a 95% Confidence Interval of 8-18 ng/L  Second Troponin (time 2 hours)  If calculated delta >= 20 ng/L,  Myocardial injury suggested ; Type of myocardial injury and treatment strategy to be determined    If 5-49 ng/L and the calculated delta is 5-19 ng/L, consult medical service for evaluation  Continue evaluation for ischemia on ecg and other possible etiology and repeat hs troponin at 4 hours  If delta                            is <5 ng/L at 2 hours, consider discharge based on risk stratification via the HEART score (if in ED), or LARON risk score in IP/Observation  HS Troponin 99th Percentile URL of a Health Population=12 ng/L with a 95% Confidence Interval of 8-18 ng/L  • Delta 2hr hsTnI 01/18/2023 -6  <20 ng/L Final   • hs TnI 4hr 01/18/2023 20  "Refer to ACS Flowchart"- see link ng/L Final    Comment:                                              Initial (time 0) result  If >=50 ng/L, Myocardial injury suggested ;  Type of myocardial injury and treatment strategy  to be determined  If 5-49 ng/L, a delta result at 2 hours and or 4 hours will be needed to further evaluate  If <4 ng/L, and chest pain has been >3 hours since onset, patient may qualify for discharge based on the HEART score in the ED  If <5 ng/L and <3hours since onset of chest pain, a delta result at 2 hours will be needed to further evaluate  HS Troponin 99th Percentile URL of a Health Population=12 ng/L with a 95% Confidence Interval of 8-18 ng/L  Second Troponin (time 2 hours)  If calculated delta >= 20 ng/L,  Myocardial injury suggested ; Type of myocardial injury and treatment strategy to be determined  If 5-49 ng/L and the calculated delta is 5-19 ng/L, consult medical service for evaluation  Continue evaluation for ischemia on ecg and other possible etiology and repeat hs troponin at 4 hours  If delta                            is <5 ng/L at 2 hours, consider discharge based on risk stratification via the HEART score (if in ED), or LARON risk score in IP/Observation  HS Troponin 99th Percentile URL of a Health Population=12 ng/L with a 95% Confidence Interval of 8-18 ng/L     • Delta 4hr hsTnI 01/18/2023 -7  <20 ng/L Final   • Ventricular Rate 01/18/2023 84  BPM Final   • Atrial Rate 01/18/2023 84  BPM Final   • OR Interval 01/18/2023 162  ms Final   • QRSD Interval 01/18/2023 88  ms Final   • QT Interval 01/18/2023 402  ms Final   • QTC Interval 01/18/2023 475  ms Final   • P Axis 01/18/2023 75  degrees Final   • QRS Axis 01/18/2023 68  degrees Final   • T Wave Axis 01/18/2023 55  degrees Final   • Ventricular Rate 01/18/2023 87  BPM Final   • Atrial Rate 01/18/2023 87  BPM Final   • OR Interval 01/18/2023 162  ms Final   • QRSD Interval 01/18/2023 82  ms Final   • QT Interval 01/18/2023 380  ms Final   • QTC Interval 01/18/2023 457  ms Final   • P Axis 01/18/2023 73  degrees Final   • QRS Axis 01/18/2023 75  degrees Final   • T Wave Axis 01/18/2023 47  degrees Final   • Procalcitonin 01/18/2023 0 10  <=0 25 ng/ml Final    Comment: Suspected Lower Respiratory Tract Infection (LRTI):  - LESS than or EQUAL to 0 25 ng/mL:   low likelihood for bacterial LRTI; antibiotics DISCOURAGED   - GREATER than 0 25 ng/mL:   increased likelihood for bacterial LRTI; antibiotics ENCOURAGED  Suspected Sepsis:  - Strongly consider initiating antibiotics in ALL UNSTABLE patients  - LESS than or EQUAL to 0 5 ng/mL:   low likelihood for bacterial sepsis; antibiotics DISCOURAGED   - GREATER than 0 5 ng/mL:   increased likelihood for bacterial sepsis; antibiotics ENCOURAGED   - GREATER than 2 ng/mL:   high risk for severe sepsis / septic shock; antibiotics strongly ENCOURAGED  Decisions on antibiotic use should not be based solely on Procalcitonin (PCT) levels  If PCT is low but uncertainty exists with stopping antibiotics, repeat PCT in 6-24 hours to confirm the low level   If antibiotics are administered (regardless if initial PCT was high or low), repeat PCT every 1-2 days to consider early antibiotic cessation (when GREATER                            than 80% decrease from the peak OR when PCT drops below designated cutoffs, whichever comes first), so long as the infection is NOT one that typically requires prolonged treatment durations (e g , bone/joint infections, endocarditis, Staph  aureus bacteremia)  Situations of FALSE-POSITIVE Procalcitonin values:  1) Newborns < 67 hours old  2) Massive stress from severe trauma / burns, major surgery, acute pancreatitis, cardiogenic / hemorrhagic shock, sickle cell crisis, or other organ perfusion abnormalities  3) Malaria and some Candidal infections  4) Treatment with agents that stimulate cytokines (e g , OKT3, anti-lymphocyte globulins, alemtuzumab, IL-2, granulocyte transfusion [NOT GCSFs])  5) Chronic renal disease causes elevated baseline levels (consider GREATER than 0 75 ng/mL as an abnormal cut-off); initiating HD/CRRT may cause transient decreases  6) Paraneoplastic syndromes from medullary thyroid or SCLC, some forms of vasculitis, and acute [id number]                            disease    Situations of FALSE-NEGATIVE Procalcitonin values:  1) Too early in clinical course for PCT to have reached its peak (may repeat in 6-24 hours to confirm low level)  2) Localized infection WITHOUT systemic (SIRS / sepsis) response (e g , an abscess, osteomyelitis, cystitis)  3) Mycobacteria (e g , Tuberculosis, MAC)  4) Cystic fibrosis exacerbations     • Platelets 70/00/3518 188  149 - 390 Thousands/uL Final   • MPV 01/18/2023 10 2  8 9 - 12 7 fL Final   • Sodium 01/19/2023 141  135 - 147 mmol/L Final   • Potassium 01/19/2023 3 7  3 5 - 5 3 mmol/L Final   • Chloride 01/19/2023 100  96 - 108 mmol/L Final   • CO2 01/19/2023 38 (H)  21 - 32 mmol/L Final   • ANION GAP 01/19/2023 3 (L)  4 - 13 mmol/L Final   • BUN 01/19/2023 13  5 - 25 mg/dL Final   • Creatinine 01/19/2023 1 20  0 60 - 1 30 mg/dL Final    Standardized to IDMS reference method   • Glucose 01/19/2023 104  65 - 140 mg/dL Final    If the patient is fasting, the ADA then defines impaired fasting glucose as > 100 mg/dL and diabetes as > or equal to 123 mg/dL    Specimen collection should occur prior to Sulfasalazine administration due to the potential for falsely depressed results  Specimen collection should occur prior to Sulfapyridine administration due to the potential for falsely elevated results  • Calcium 01/19/2023 8 5  8 4 - 10 2 mg/dL Final   • eGFR 01/19/2023 57  ml/min/1 73sq m Final   • WBC 01/19/2023 6 55  4 31 - 10 16 Thousand/uL Final   • RBC 01/19/2023 4 50  3 88 - 5 62 Million/uL Final   • Hemoglobin 01/19/2023 14 6  12 0 - 17 0 g/dL Final   • Hematocrit 01/19/2023 46 0  36 5 - 49 3 % Final   • MCV 01/19/2023 102 (H)  82 - 98 fL Final   • MCH 01/19/2023 32 4  26 8 - 34 3 pg Final   • MCHC 01/19/2023 31 7  31 4 - 37 4 g/dL Final   • RDW 01/19/2023 14 6  11 6 - 15 1 % Final   • Platelets 09/79/7825 163  149 - 390 Thousands/uL Final   • MPV 01/19/2023 10 1  8 9 - 12 7 fL Final   • Magnesium 01/19/2023 2 1  1 9 - 2 7 mg/dL Final   • Procalcitonin 01/18/2023 0 13  <=0 25 ng/ml Final    Comment: Suspected Lower Respiratory Tract Infection (LRTI):  - LESS than or EQUAL to 0 25 ng/mL:   low likelihood for bacterial LRTI; antibiotics DISCOURAGED   - GREATER than 0 25 ng/mL:   increased likelihood for bacterial LRTI; antibiotics ENCOURAGED  Suspected Sepsis:  - Strongly consider initiating antibiotics in ALL UNSTABLE patients  - LESS than or EQUAL to 0 5 ng/mL:   low likelihood for bacterial sepsis; antibiotics DISCOURAGED   - GREATER than 0 5 ng/mL:   increased likelihood for bacterial sepsis; antibiotics ENCOURAGED   - GREATER than 2 ng/mL:   high risk for severe sepsis / septic shock; antibiotics strongly ENCOURAGED  Decisions on antibiotic use should not be based solely on Procalcitonin (PCT) levels  If PCT is low but uncertainty exists with stopping antibiotics, repeat PCT in 6-24 hours to confirm the low level   If antibiotics are administered (regardless if initial PCT was high or low), repeat PCT every 1-2 days to consider early antibiotic cessation (when GREATER than 80% decrease from the peak OR when PCT drops below designated cutoffs, whichever comes first), so long as the infection is NOT one that typically requires prolonged treatment durations (e g , bone/joint infections, endocarditis, Staph  aureus bacteremia)  Situations of FALSE-POSITIVE Procalcitonin values:  1) Newborns < 67 hours old  2) Massive stress from severe trauma / burns, major surgery, acute pancreatitis, cardiogenic / hemorrhagic shock, sickle cell crisis, or other organ perfusion abnormalities  3) Malaria and some Candidal infections  4) Treatment with agents that stimulate cytokines (e g , OKT3, anti-lymphocyte globulins, alemtuzumab, IL-2, granulocyte transfusion [NOT GCSFs])  5) Chronic renal disease causes elevated baseline levels (consider GREATER than 0 75 ng/mL as an abnormal cut-off); initiating HD/CRRT may cause transient decreases  6) Paraneoplastic syndromes from medullary thyroid or SCLC, some forms of vasculitis, and acute [id number]                            disease    Situations of FALSE-NEGATIVE Procalcitonin values:  1) Too early in clinical course for PCT to have reached its peak (may repeat in 6-24 hours to confirm low level)  2) Localized infection WITHOUT systemic (SIRS / sepsis) response (e g , an abscess, osteomyelitis, cystitis)  3) Mycobacteria (e g , Tuberculosis, MAC)  4) Cystic fibrosis exacerbations     • Ventricular Rate 01/18/2023 93  BPM Final   • Atrial Rate 01/18/2023 93  BPM Final   • MN Interval 01/18/2023 160  ms Final   • QRSD Interval 01/18/2023 92  ms Final   • QT Interval 01/18/2023 376  ms Final   • QTC Interval 01/18/2023 467  ms Final   • P Axis 01/18/2023 77  degrees Final   • QRS Axis 01/18/2023 66  degrees Final   • T Wave Axis 01/18/2023 80  degrees Final       Imaging:  I have personally reviewed all pertinent results      Assessment/Plan    Left leg pain  - Concern for DVT, recommended STAT duplex   - Schedule for this afternoon Transient right leg weakness  - Concern for TIA, multiple risk factors including carotid artery stenosis history   - Recommended further work-up, MRI brain and carotid US   - Blood work ordered   - ED precautions discussed     Israel Goldstein was seen today for transition of care management and leg pain  Diagnoses and all orders for this visit:    Acute cough  -     doxycycline hyclate (VIBRAMYCIN) 100 mg capsule; Take 1 capsule (100 mg total) by mouth every 12 (twelve) hours for 7 days  -     benzonatate (TESSALON) 200 MG capsule; Take 1 capsule (200 mg total) by mouth 3 (three) times a day as needed for cough    Chronic obstructive pulmonary disease with acute exacerbation (HCC)  -     budesonide (PULMICORT) 0 5 mg/2 mL nebulizer solution; Take 2 mL (0 5 mg total) by nebulization every 12 (twelve) hours Rinse mouth after use  Chronic respiratory failure with hypoxia (HCC)    Coronary artery disease involving native coronary artery of native heart without angina pectoris  -     VAS carotid complete study; Future    Stage 3a chronic kidney disease ([facility] [address] )    Primary hypertension  -     HEMOGLOBIN A1C W/ EAG ESTIMATION; Future  -     Lipid Panel with Direct LDL reflex; Future    Gastroesophageal reflux disease, unspecified whether esophagitis present  -     famotidine (PEPCID) 20 mg tablet; Take 1 tablet (20 mg total) by mouth daily at bedtime    Pre-diabetes  -     HEMOGLOBIN A1C W/ EAG ESTIMATION; Future    Depression, recurrent (HCC)  -     Vitamin B12; Future  -     TSH, 3rd generation with Free T4 reflex; Future    Left leg pain  -     VAS lower limb venous duplex study, unilateral/limited; Future    Transient right leg weakness  -     MRI brain wo contrast; Future  -     Vitamin B12; Future  -     Comprehensive metabolic panel; Future  -     CBC and differential; Future  -     TSH, 3rd generation with Free T4 reflex; Future  -     VAS carotid complete study;  Future    Elevated MCV  -     Vitamin B12; Future    Other disturbances of skin sensation  -     Vitamin B12; Future    Bilateral carotid artery stenosis  -     VAS carotid complete study;  Future          Future Appointments   Date Time Provider Department Center   1/27/2023 To Be Determined LEYDA Gibson [facility]   1/31/2023 To Be Determined Teresa Ocampo RN [facility]   2/3/2023 To Be Determined Teresa Ocampo RN [facility]   2/3/2023  1:20 PM Chiquita Baltazar DO [facility]   2/13/2023  2:00 PM ROSIBEL Urbano [facility]   2/17/2023  2:00 PM INOCENTE Escobar [facility]   3/8/2023  2:20 PM Chiquita Baltazar DO [facility]  [facility], DO  [facility]

## 2023-01-26 NOTE — TELEPHONE ENCOUNTER
Patient called back  Was under the impression he should have had an order for an Xray as well  Is this something you also wanted ordered? Patient informed PCP is out of office today

## 2023-01-26 NOTE — TELEPHONE ENCOUNTER
To follow-up our visit today, he had to leave urgently to make his appointment-     To work-up his right sided weakness episodes, please let him know I've ordered an MRI brain and ultrasound of his carotids  If he has another episode, he should go to the ED  I've ordered blood work for him, please ask him to complete  Please schedule follow-up 2-4 weeks  40 minute

## 2023-01-26 NOTE — ASSESSMENT & PLAN NOTE
- Concern for TIA, multiple risk factors including carotid artery stenosis history   - Recommended further work-up, MRI brain and carotid US   - Blood work ordered   - ED precautions discussed

## 2023-01-27 ENCOUNTER — APPOINTMENT (OUTPATIENT)
Dept: LAB | Facility: CLINIC | Age: 78
End: 2023-01-27

## 2023-01-27 ENCOUNTER — HOME CARE VISIT (OUTPATIENT)
Dept: HOME HEALTH SERVICES | Facility: HOME HEALTHCARE | Age: 78
End: 2023-01-27

## 2023-01-27 DIAGNOSIS — R20.8 OTHER DISTURBANCES OF SKIN SENSATION: ICD-10-CM

## 2023-01-27 DIAGNOSIS — R73.03 PRE-DIABETES: ICD-10-CM

## 2023-01-27 DIAGNOSIS — F33.9 DEPRESSION, RECURRENT (HCC): ICD-10-CM

## 2023-01-27 DIAGNOSIS — R71.8 ELEVATED MCV: ICD-10-CM

## 2023-01-27 DIAGNOSIS — R29.898 TRANSIENT RIGHT LEG WEAKNESS: ICD-10-CM

## 2023-01-27 DIAGNOSIS — I10 PRIMARY HYPERTENSION: Chronic | ICD-10-CM

## 2023-01-27 LAB
ALBUMIN SERPL BCP-MCNC: 3.8 G/DL (ref 3.5–5)
ALP SERPL-CCNC: 77 U/L (ref 46–116)
ALT SERPL W P-5'-P-CCNC: 31 U/L (ref 12–78)
ANION GAP SERPL CALCULATED.3IONS-SCNC: 5 MMOL/L (ref 4–13)
AST SERPL W P-5'-P-CCNC: 27 U/L (ref 5–45)
BASOPHILS # BLD AUTO: 0.07 THOUSANDS/ÂΜL (ref 0–0.1)
BASOPHILS NFR BLD AUTO: 1 % (ref 0–1)
BILIRUB SERPL-MCNC: 0.78 MG/DL (ref 0.2–1)
BUN SERPL-MCNC: 15 MG/DL (ref 5–25)
CALCIUM SERPL-MCNC: 9.8 MG/DL (ref 8.3–10.1)
CHLORIDE SERPL-SCNC: 102 MMOL/L (ref 96–108)
CHOLEST SERPL-MCNC: 132 MG/DL
CO2 SERPL-SCNC: 35 MMOL/L (ref 21–32)
CREAT SERPL-MCNC: 1.11 MG/DL (ref 0.6–1.3)
EOSINOPHIL # BLD AUTO: 0.24 THOUSAND/ÂΜL (ref 0–0.61)
EOSINOPHIL NFR BLD AUTO: 4 % (ref 0–6)
ERYTHROCYTE [DISTWIDTH] IN BLOOD BY AUTOMATED COUNT: 14.8 % (ref 11.6–15.1)
GFR SERPL CREATININE-BSD FRML MDRD: 63 ML/MIN/1.73SQ M
GLUCOSE P FAST SERPL-MCNC: 103 MG/DL (ref 65–99)
HCT VFR BLD AUTO: 50.5 % (ref 36.5–49.3)
HDLC SERPL-MCNC: 69 MG/DL
HGB BLD-MCNC: 16.4 G/DL (ref 12–17)
IMM GRANULOCYTES # BLD AUTO: 0.02 THOUSAND/UL (ref 0–0.2)
IMM GRANULOCYTES NFR BLD AUTO: 0 % (ref 0–2)
LDLC SERPL CALC-MCNC: 45 MG/DL (ref 0–100)
LYMPHOCYTES # BLD AUTO: 0.98 THOUSANDS/ÂΜL (ref 0.6–4.47)
LYMPHOCYTES NFR BLD AUTO: 14 % (ref 14–44)
MCH RBC QN AUTO: 32.3 PG (ref 26.8–34.3)
MCHC RBC AUTO-ENTMCNC: 32.5 G/DL (ref 31.4–37.4)
MCV RBC AUTO: 100 FL (ref 82–98)
MONOCYTES # BLD AUTO: 0.79 THOUSAND/ÂΜL (ref 0.17–1.22)
MONOCYTES NFR BLD AUTO: 12 % (ref 4–12)
NEUTROPHILS # BLD AUTO: 4.69 THOUSANDS/ÂΜL (ref 1.85–7.62)
NEUTS SEG NFR BLD AUTO: 69 % (ref 43–75)
NRBC BLD AUTO-RTO: 0 /100 WBCS
PLATELET # BLD AUTO: 164 THOUSANDS/UL (ref 149–390)
PMV BLD AUTO: 10.9 FL (ref 8.9–12.7)
POTASSIUM SERPL-SCNC: 3.8 MMOL/L (ref 3.5–5.3)
PROT SERPL-MCNC: 6.8 G/DL (ref 6.4–8.4)
RBC # BLD AUTO: 5.07 MILLION/UL (ref 3.88–5.62)
SODIUM SERPL-SCNC: 142 MMOL/L (ref 135–147)
TRIGL SERPL-MCNC: 90 MG/DL
TSH SERPL DL<=0.05 MIU/L-ACNC: 0.86 UIU/ML (ref 0.45–4.5)
VIT B12 SERPL-MCNC: 543 PG/ML (ref 100–900)
WBC # BLD AUTO: 6.79 THOUSAND/UL (ref 4.31–10.16)

## 2023-01-28 LAB
EST. AVERAGE GLUCOSE BLD GHB EST-MCNC: 123 MG/DL
HBA1C MFR BLD: 5.9 %

## 2023-01-30 ENCOUNTER — HOSPITAL ENCOUNTER (OUTPATIENT)
Dept: MRI IMAGING | Facility: HOSPITAL | Age: 78
Discharge: HOME | End: 2023-01-30

## 2023-01-30 DIAGNOSIS — R29.898 TRANSIENT RIGHT LEG WEAKNESS: ICD-10-CM

## 2023-01-31 ENCOUNTER — HOME CARE VISIT (OUTPATIENT)
Dept: HOME HEALTH SERVICES | Facility: HOME HEALTHCARE | Age: 78
End: 2023-01-31

## 2023-01-31 VITALS
OXYGEN SATURATION: 90 % | SYSTOLIC BLOOD PRESSURE: 140 MMHG | TEMPERATURE: 97.3 F | RESPIRATION RATE: 20 BRPM | DIASTOLIC BLOOD PRESSURE: 82 MMHG | HEART RATE: 84 BPM

## 2023-02-02 ENCOUNTER — HOME CARE VISIT (OUTPATIENT)
Dept: HOME HEALTH SERVICES | Facility: HOME HEALTHCARE | Age: 78
End: 2023-02-02

## 2023-02-02 VITALS
OXYGEN SATURATION: 95 % | SYSTOLIC BLOOD PRESSURE: 122 MMHG | DIASTOLIC BLOOD PRESSURE: 60 MMHG | HEART RATE: 84 BPM | TEMPERATURE: 97.2 F | RESPIRATION RATE: 22 BRPM

## 2023-02-03 ENCOUNTER — HOSPITAL ENCOUNTER (OUTPATIENT)
Dept: NON INVASIVE DIAGNOSTICS | Facility: CLINIC | Age: 78
Discharge: HOME | End: 2023-02-03

## 2023-02-03 ENCOUNTER — TELEPHONE (OUTPATIENT)
Dept: CARDIOLOGY CLINIC | Facility: CLINIC | Age: 78
End: 2023-02-03

## 2023-02-03 ENCOUNTER — OFFICE VISIT (OUTPATIENT)
Dept: CARDIOLOGY CLINIC | Facility: CLINIC | Age: 78
End: 2023-02-03

## 2023-02-03 VITALS
HEART RATE: 89 BPM | HEIGHT: 68 IN | WEIGHT: 208 LBS | DIASTOLIC BLOOD PRESSURE: 80 MMHG | BODY MASS INDEX: 31.52 KG/M2 | SYSTOLIC BLOOD PRESSURE: 150 MMHG

## 2023-02-03 VITALS
HEIGHT: 68 IN | SYSTOLIC BLOOD PRESSURE: 110 MMHG | DIASTOLIC BLOOD PRESSURE: 60 MMHG | WEIGHT: 208 LBS | HEART RATE: 90 BPM | OXYGEN SATURATION: 94 % | BODY MASS INDEX: 31.52 KG/M2

## 2023-02-03 DIAGNOSIS — R06.09 DYSPNEA ON EXERTION: ICD-10-CM

## 2023-02-03 DIAGNOSIS — I42.9 CARDIOMYOPATHY, UNSPECIFIED TYPE (HCC): Primary | ICD-10-CM

## 2023-02-03 DIAGNOSIS — I10 PRIMARY HYPERTENSION: ICD-10-CM

## 2023-02-03 DIAGNOSIS — E78.5 HYPERLIPIDEMIA, UNSPECIFIED HYPERLIPIDEMIA TYPE: ICD-10-CM

## 2023-02-03 LAB
AORTIC ROOT: 3.6 CM
AORTIC VALVE MEAN VELOCITY: 17.2 M/S
APICAL FOUR CHAMBER EJECTION FRACTION: 48 %
AV AREA BY CONTINUOUS VTI: 1.6 CM2
AV AREA PEAK VELOCITY: 1.4 CM2
AV LVOT MEAN GRADIENT: 2 MMHG
AV LVOT PEAK GRADIENT: 3 MMHG
AV MEAN GRADIENT: 13 MMHG
AV PEAK GRADIENT: 19 MMHG
AV VALVE AREA: 1.57 CM2
AV VELOCITY RATIO: 0.37
DOP CALC AO PEAK VEL: 2.16 M/S
DOP CALC AO VTI: 52.28 CM
DOP CALC LVOT AREA: 3.8 CM2
DOP CALC LVOT DIAMETER: 2.2 CM
DOP CALC LVOT PEAK VEL VTI: 21.54 CM
DOP CALC LVOT PEAK VEL: 0.8 M/S
DOP CALC LVOT STROKE INDEX: 40.9 ML/M2
DOP CALC LVOT STROKE VOLUME: 81.84
DOP CALC RVOT PEAK VEL: 0.77 M/S
DOP CALC RVOT VTI: 15.46 CM
FRACTIONAL SHORTENING: 9 % (ref 28–44)
INTERVENTRICULAR SEPTUM IN DIASTOLE (PARASTERNAL SHORT AXIS VIEW): 0.9 CM
INTERVENTRICULAR SEPTUM: 0.9 CM (ref 0.6–1.1)
LAAS-AP2: 15.6 CM2
LAAS-AP4: 26.4 CM2
LEFT ATRIUM SIZE: 5.2 CM
LEFT INTERNAL DIMENSION IN SYSTOLE: 5 CM (ref 2.1–4)
LEFT VENTRICLE DIASTOLIC VOLUME (MOD BIPLANE): 151 ML
LEFT VENTRICLE SYSTOLIC VOLUME (MOD BIPLANE): 79 ML
LEFT VENTRICULAR INTERNAL DIMENSION IN DIASTOLE: 5.5 CM (ref 3.5–6)
LEFT VENTRICULAR POSTERIOR WALL IN END DIASTOLE: 0.9 CM
LEFT VENTRICULAR STROKE VOLUME: 53 ML
LV EF: 47 %
LVSV (TEICH): 53 ML
MITRAL REGURGITATION PEAK VELOCITY: 6 M/S
MITRAL VALVE MEAN INFLOW VELOCITY: 4.33 M/S
MITRAL VALVE REGURGITANT PEAK GRADIENT: 144 MMHG
MV E'TISSUE VEL-SEP: 5 CM/S
RIGHT VENTRICLE ID DIMENSION: 2.5 CM
SL CV DOP CALC MV VTI RETROGRADE: 192.8 CM
SL CV LEFT ATRIUM LENGTH A2C: 4.8 CM
SL CV LV EF: 40
SL CV MV MEAN GRADIENT RETROGRADE: 86 MMHG
SL CV PED ECHO LEFT VENTRICLE DIASTOLIC VOLUME (MOD BIPLANE) 2D: 173 ML
SL CV PED ECHO LEFT VENTRICLE SYSTOLIC VOLUME (MOD BIPLANE) 2D: 119 ML
SL CV RVOT MAX GRADIENT: 2 MMHG
SL CV RVOT MEAN GRADIENT: 1 MMHG
SL CV RVOT VMEAN: 0.53 M/S
TR MAX PG: 33 MMHG
TR PEAK VELOCITY: 2.9 M/S
TRICUSPID ANNULAR PLANE SYSTOLIC EXCURSION: 1.9 CM
TRICUSPID VALVE PEAK REGURGITATION VELOCITY: 2.87 M/S

## 2023-02-03 NOTE — PROGRESS NOTES
Cardiology Consultation     Gustavo Almanza  [id number]  1945  [facility] [address] [id number]      No diagnosis found  Discussion/Summary:  1  Dyspnea on exertion  2  Coronary artery disease with PCI and cardiac catheterization in March 2021 showing stable disease without need for revascularization  3  Ischemic cardiomyopathy LVEF 40-45%  4  Hypertension  5  Hyperlipidemia  6  COPD on chronic nasal cannula oxygen 2 L  7  Aortic stenosis  8  Mitral regurgitation    -Weight in the office today similar to what it was in December 2022 when I saw patient  -Patient has no significant lower extremity edema denies orthopnea but has expiratory wheezing on exam   Patient will be following up with pulmonology and if cleared by them will likely have patient undergo regadenoson nuclear stress testing  If not patient may need repeat cardiac catheterization for recurrence of exertional dyspnea  -We will check BNP and transthoracic echocardiogram to evaluate overall cardiac structure and function  -Pending results will help determine overall perioperative restratification for patient prior to undergoing  -Patient will continue aspirin 81 mg daily, furosemide 40 mg daily, metoprolol succinate 12 5 mg twice daily, potassium 20 M EQ daily  -Patient will continue Crestor 40 mg daily  -We will see patient in 1 month or sooner if necessary once testing is completed  -Patient counseled on dietary and lifestyle modifications including sodium and fluid restricted diet to less than 1800 mg of sodium daily and less than 1800 mL of fluid daily  -Patient counseled if he were to have any warning or alarm type symptoms he is to seek emergency medical care immediately      History of Present Illness:  -Patient is a 19-year-old male with hypertension, hyperlipidemia, obesity, COPD with chronic nasal cannula oxygen use 2 L along with chronic antibiotic therapy as well as obstructive sleep apnea compliant with CPAP therapy, known coronary artery disease with history of MI in 1995 with PCI to RCA, stenting in 1999 and 2001 involving RCA and then again in 2003 with stenting to LAD with cardiac catheterization in March 2021 showing no obstructive disease is need of revascularization along with asymptomatic PVCs who was hospitalized in January 2023 for chest pain found to have COVID-19 at that time  During his hospitalization in early January he had had discontinuation of his metoprolol and then represented to the hospital with chest discomfort  Troponins were trended and were negative x3 and EKG had baseline nonspecific ST abnormalities and patient was eventually able to be discharged with outpatient follow-up and presents to the office today for scheduled follow-up   -Patient notes that since having COVID-19 he has had worsening of his baseline shortness of breath requiring his 2 L nasal cannula oxygen more frequently as he gets short of breath with exertion   -While he denies any active chest pain, lower extremity edema, orthopnea  He is also noticed that he has had increased cough lately and after significant coughing does have chest discomfort   -He notes his weight has been around the same and has not had any recurrence of his lower extremity edema and still has significant urine output with his daily Lasix therapy      Patient Active Problem List   Diagnosis   • Hyperlipidemia   • Coronary artery disease involving native coronary artery of native heart without angina pectoris   • Bilateral carotid artery stenosis   • Hypertension   • Chronic obstructive pulmonary disease with acute exacerbation (HCC)   • Oxygen dependent   • Depression, recurrent (HCC)   • S/P carotid endarterectomy   • Stented coronary artery   • Vertigo   • Anisometropia   • Osteoarthritis of spinal facet joint   • Chronic ischemic heart disease   • Chronic diastolic (congestive) heart failure (HCC)   • Diverticular disease of colon   • Dry eye syndrome   • Gastroesophageal reflux disease   • Hearing loss   • Elevated PSA   • Hypoxia   • Lens replaced by other means   • Lumbar radiculopathy   • Multinodular goiter   • Nuclear senile cataract   • Obesity   • Occlusion and stenosis of carotid artery   • Osteoarthritis of hip   • Prediabetes   • Psychosexual dysfunction with inhibited sexual excitement   • Sleep apnea   • Solitary pulmonary nodule   • Thyroid nodule   • Guyon syndrome, left   • Costochondral chest pain   • Chest pain   • Abnormal nuclear stress test   • Right hip pain   • Primary insomnia   • Chronic right-sided thoracic back pain   • Stage 3a chronic kidney disease (HCC)   • Hoarseness or changing voice   • Chronic bilateral low back pain with right-sided sciatica   • Mid back pain   • Thoracic radiculopathy   • Chronic pain syndrome   • Urinary frequency   • Incomplete bladder emptying   • Intercostal neuralgia   • Cubital tunnel syndrome on left   • Carpal tunnel syndrome on left   • Elevated MCV   • Prostate cancer (HCC)   • Chronic respiratory failure with hypoxia (HCC)   • Obstructive sleep apnea syndrome in adult   • Sensorineural hearing loss, bilateral   • RSV (respiratory syncytial virus infection)   • Right lower quadrant abdominal pain   • COVID   • Ambulatory dysfunction   • COPD (chronic obstructive pulmonary disease) (HCC)   • Hypokalemia   • Left leg pain   • Transient right leg weakness     Past Medical History:   Diagnosis Date   • Bilateral carotid artery stenosis    • Cancer (HCC)     skin   • Chronic diastolic heart failure (HCC)    • Chronic ischemic heart disease    • Chronic kidney disease     stage 3   • Colon polyp    • COPD (chronic obstructive pulmonary disease) (HCC)    • Coronary artery disease     hx stents, MI, PCI   • COVID 11/2021   • CPAP (continuous positive airway pressure) dependence    • Hearing loss    • History of transfusion    • Hyperlipidemia    • Hypertension    • MI (myocardial infarction) ([facility] )    • Myocardial infarction ([facility] )    • Pneumonia    • Prostate cancer ([place] [address] )    • RSV (respiratory syncytial virus infection) 10/2022   • S/P carotid endarterectomy    • Shortness of breath     O2 2 l/nc PRN   • Sleep apnea    • Sleep apnea, obstructive    • Stented coronary artery      Social History     Socioeconomic History   • Marital status: /Civil Union     Spouse name: Not on file   • Number of children: Not on file   • Years of education: Not on file   • Highest education level: Not on file   Occupational History   • Not on file   Tobacco Use   • Smoking status: Former     Packs/day: 2 00     Years: 35 00     Pack years: 70 00     Types: Cigarettes     Quit date: 1995     Years since quittin 5   • Smokeless tobacco: Never   Vaping Use   • Vaping Use: Never used   Substance and Sexual Activity   • Alcohol use: Not Currently   • Drug use: Never   • Sexual activity: Not on file   Other Topics Concern   • Not on file   Social History Narrative   • Not on file     Social Determinants of Health     Financial Resource Strain: Not on file   Food Insecurity: No Food Insecurity   • Worried About Running Out of Food in the Last Year: Never true   • Ran Out of Food in the Last Year: Never true   Transportation Needs: No Transportation Needs   • Lack of Transportation (Medical): No   • Lack of Transportation (Non-Medical):  No   Physical Activity: Not on file   Stress: Not on file   Social Connections: Not on file   Intimate Partner Violence: Not on file   Housing Stability: Low Risk    • Unable to Pay for Housing in the Last Year: No   • Number of Places Lived in the Last Year: 1   • Unstable Housing in the Last Year: No      Family History   Problem Relation Age of Onset   • Heart attack Mother    • Dementia Mother    • No Known Problems Father      Past Surgical History:   Procedure Laterality Date   • APPENDECTOMY     • CARDIAC CATHETERIZATION  03/18/2021    left main with no significant disease, proximal LAD with 10% stenosis at the site of prior stent, left circumflex artery with minimal luminal irregularities mid RCA with 50% stenosis at site of prior stent and PL segment with distal disease supplied by collaterals from the distal circumflex with no significant CAD requiring revascularization at that time     • CATARACT EXTRACTION, BILATERAL Bilateral    • COLONOSCOPY     • CORONARY ANGIOPLASTY WITH STENT PLACEMENT  1999    RCA   • CORONARY ANGIOPLASTY WITH STENT PLACEMENT  2001    RCA   • CORONARY ANGIOPLASTY WITH STENT PLACEMENT  2003    LAD   • EYE SURGERY     • HI BX PROSTATE STRTCTC SATURATION SAMPLING IMG GID N/A 09/13/2022    Procedure: TRANSPERINEAL MRI FUSION  BIOPSY PROSTATE;  Surgeon: Mikel Kay MD;  Location: [facility];  Service: Urology   • HI NEUROPLASTY &/TRANSPOS MEDIAN NRV Scott Erwin Left 07/22/2022    Procedure: RELEASE CARPAL TUNNEL;  Surgeon: Meera Snow MD;  Location: [facility];  Service: Orthopedics   • HI NEUROPLASTY &/TRANSPOSITION ULNAR NERVE ELBOW Left 07/22/2022    Procedure: RELEASE CUBITAL TUNNEL;  Surgeon: Meera Snow MD;  Location[facility];  Service: Orthopedics   • HI NEUROPLASTY &/TRANSPOSITION ULNAR NERVE WRIST Left 07/22/2022    Procedure: Driscilla Marquez RELEASE;  Surgeon: Meera Snow MD;  Location: [facility];  Service: Orthopedics   • SKIN CANCER EXCISION  2012    chin-per pt, basal cell  also right ankle       Current Outpatient Medications:   •  albuterol (2 5 mg/3 mL) 0 083 % nebulizer solution, Take 3 mL (2 5 mg total) by nebulization every 6 (six) hours as needed for wheezing or shortness of breath, Disp: 60 mL, Rfl: 1  •  aspirin 81 mg chewable tablet, CHEW ONE TABLET BY MOUTH EVERY DAY, Disp: , Rfl:   •  benzonatate (TESSALON) 200 MG capsule, Take 1 capsule (200 mg total) by mouth 3 (three) times a day as needed for cough, Disp: 20 capsule, Rfl: 0  •  budesonide (PULMICORT) 0 5 mg/2 mL nebulizer solution, Take 2 mL (0 5 mg total) by nebulization every 12 (twelve) hours Rinse mouth after use , Disp: 120 mL, Rfl: 0  •  famotidine (PEPCID) 20 mg tablet, Take 1 tablet (20 mg total) by mouth daily at bedtime, Disp: 100 tablet, Rfl: 1  •  fluticasone (FLONASE) 50 mcg/act nasal spray, into each nostril as needed , Disp: , Rfl:   •  furosemide (LASIX) 40 mg tablet, Take 1 tablet (40 mg total) by mouth daily, Disp: 100 tablet, Rfl: 3  •  gabapentin (NEURONTIN) 300 mg capsule, Take 2 capsules (600 mg total) by mouth 2 (two) times a day, Disp: 60 capsule, Rfl: 0  •  metoprolol succinate (TOPROL-XL) 25 mg 24 hr tablet, Take 0 5 tablets (12 5 mg total) by mouth 2 (two) times a day, Disp: 30 tablet, Rfl: 0  •  oxygen gas, Inhale 2 L/min continuous   Indications: SOB, pulmonary edema suspected, Disp: , Rfl:   •  potassium chloride (Klor-Con) 10 mEq tablet, Take 2 tablets (20 mEq total) by mouth daily, Disp: 200 tablet, Rfl: 3  •  rosuvastatin (CRESTOR) 40 MG tablet, TAKE 1 TABLET DAILY (Patient taking differently: Take 40 mg by mouth daily at bedtime), Disp: 100 tablet, Rfl: 3  •  Tiotropium Bromide Monohydrate (SPIRIVA RESPIMAT IN), Inhale every morning, Disp: , Rfl:   •  Blood Pressure Monitor KIT, Use daily (Patient taking differently: Use 40 mg daily), Disp: 1 kit, Rfl: 0  Allergies   Allergen Reactions   • Lisinopril Swelling and Cough   • Tetanus Antitoxin Anaphylaxis   • Tetanus Toxoid Anaphylaxis and Swelling         Labs:  Appointment on 01/27/2023   Component Date Value   • Vitamin B-12 01/27/2023 543    • Sodium 01/27/2023 142    • Potassium 01/27/2023 3 8    • Chloride 01/27/2023 102    • CO2 01/27/2023 35 (H)    • ANION GAP 01/27/2023 5    • BUN 01/27/2023 15    • Creatinine 01/27/2023 1 11    • Glucose, Fasting 01/27/2023 103 (H)    • Calcium 01/27/2023 9 8    • AST 01/27/2023 27    • ALT 01/27/2023 31 • Alkaline Phosphatase 01/27/2023 77    • Total Protein 01/27/2023 6 8    • Albumin 01/27/2023 3 8    • Total Bilirubin 01/27/2023 0 78    • eGFR 01/27/2023 63    • WBC 01/27/2023 6 79    • RBC 01/27/2023 5 07    • Hemoglobin 01/27/2023 16 4    • Hematocrit 01/27/2023 50 5 (H)    • MCV 01/27/2023 100 (H)    • MCH 01/27/2023 32 3    • MCHC 01/27/2023 32 5    • RDW 01/27/2023 14 8    • MPV 01/27/2023 10 9    • Platelets 73/79/7642 164    • nRBC 01/27/2023 0    • Neutrophils Relative 01/27/2023 69    • Immat GRANS % 01/27/2023 0    • Lymphocytes Relative 01/27/2023 14    • Monocytes Relative 01/27/2023 12    • Eosinophils Relative 01/27/2023 4    • Basophils Relative 01/27/2023 1    • Neutrophils Absolute 01/27/2023 4 69    • Immature Grans Absolute 01/27/2023 0 02    • Lymphocytes Absolute 01/27/2023 0 98    • Monocytes Absolute 01/27/2023 0 79    • Eosinophils Absolute 01/27/2023 0 24    • Basophils Absolute 01/27/2023 0 07    • TSH 3RD GENERATON 01/27/2023 0 860    • Hemoglobin A1C 01/27/2023 5 9 (H)    • EAG 01/27/2023 123    • Cholesterol 01/27/2023 132    • Triglycerides 01/27/2023 90    • HDL, Direct 01/27/2023 69    • LDL Calculated 01/27/2023 45    Admission on 01/18/2023, Discharged on 01/19/2023   Component Date Value   • WBC 01/18/2023 9 82    • RBC 01/18/2023 4 81    • Hemoglobin 01/18/2023 15 5    • Hematocrit 01/18/2023 49 1    • MCV 01/18/2023 102 (H)    • MCH 01/18/2023 32 2    • MCHC 01/18/2023 31 6    • RDW 01/18/2023 14 8    • MPV 01/18/2023 10 8    • Platelets 79/28/8815 212    • nRBC 01/18/2023 0    • Neutrophils Relative 01/18/2023 63    • Immat GRANS % 01/18/2023 1    • Lymphocytes Relative 01/18/2023 18    • Monocytes Relative 01/18/2023 13 (H)    • Eosinophils Relative 01/18/2023 4    • Basophils Relative 01/18/2023 1    • Neutrophils Absolute 01/18/2023 6 33    • Immature Grans Absolute 01/18/2023 0 07    • Lymphocytes Absolute 01/18/2023 1 73    • Monocytes Absolute 01/18/2023 1 27 (H) • Eosinophils Absolute 01/18/2023 0 36    • Basophils Absolute 01/18/2023 0 06    • Sodium 01/18/2023 141    • Potassium 01/18/2023 3 4 (L)    • Chloride 01/18/2023 99    • CO2 01/18/2023 37 (H)    • ANION GAP 01/18/2023 5    • BUN 01/18/2023 15    • Creatinine 01/18/2023 1 35 (H)    • Glucose 01/18/2023 111    • Calcium 01/18/2023 8 9    • AST 01/18/2023 23    • ALT 01/18/2023 17    • Alkaline Phosphatase 01/18/2023 73    • Total Protein 01/18/2023 6 0 (L)    • Albumin 01/18/2023 3 8    • Total Bilirubin 01/18/2023 0 87    • eGFR 01/18/2023 49    • Magnesium 01/18/2023 2 2    • BNP 01/18/2023 41    • hs TnI 0hr 01/18/2023 27    • hs TnI 2hr 01/18/2023 21    • Delta 2hr hsTnI 01/18/2023 -6    • hs TnI 4hr 01/18/2023 20    • Delta 4hr hsTnI 01/18/2023 -7    • Ventricular Rate 01/18/2023 84    • Atrial Rate 01/18/2023 84    • IL Interval 01/18/2023 162    • QRSD Interval 01/18/2023 88    • QT Interval 01/18/2023 402    • QTC Interval 01/18/2023 475    • P Axis 01/18/2023 75    • QRS Axis 01/18/2023 68    • T Wave Axis 01/18/2023 55    • Ventricular Rate 01/18/2023 87    • Atrial Rate 01/18/2023 87    • IL Interval 01/18/2023 162    • QRSD Interval 01/18/2023 82    • QT Interval 01/18/2023 380    • QTC Interval 01/18/2023 457    • P Axis 01/18/2023 73    • QRS Axis 01/18/2023 75    • T Wave Axis 01/18/2023 47    • Procalcitonin 01/18/2023 0 10    • Platelets 08/70/3839 188    • MPV 01/18/2023 10 2    • Sodium 01/19/2023 141    • Potassium 01/19/2023 3 7    • Chloride 01/19/2023 100    • CO2 01/19/2023 38 (H)    • ANION GAP 01/19/2023 3 (L)    • BUN 01/19/2023 13    • Creatinine 01/19/2023 1 20    • Glucose 01/19/2023 104    • Calcium 01/19/2023 8 5    • eGFR 01/19/2023 57    • WBC 01/19/2023 6 55    • RBC 01/19/2023 4 50    • Hemoglobin 01/19/2023 14 6    • Hematocrit 01/19/2023 46 0    • MCV 01/19/2023 102 (H)    • MCH 01/19/2023 32 4    • MCHC 01/19/2023 31 7    • RDW 01/19/2023 14 6    • Platelets 87/71/2110 163 • MPV 01/19/2023 10 1    • Magnesium 01/19/2023 2 1    • Procalcitonin 01/18/2023 0 13    • Ventricular Rate 01/18/2023 93    • Atrial Rate 01/18/2023 93    • MD Interval 01/18/2023 160    • QRSD Interval 01/18/2023 92    • QT Interval 01/18/2023 376    • QTC Interval 01/18/2023 467    • P Axis 01/18/2023 77    • QRS Axis 01/18/2023 66    • T Wave Jackson 01/18/2023 80    Admission on 01/09/2023, Discharged on 01/11/2023   Component Date Value   • Ventricular Rate 01/09/2023 65    • Atrial Rate 01/09/2023 65    • MD Interval 01/09/2023 176    • QRSD Interval 01/09/2023 100    • QT Interval 01/09/2023 452    • QTC Interval 01/09/2023 470    • P Axis 01/09/2023 80    • QRS Axis 01/09/2023 69    • T Wave Axis 01/09/2023 89    • WBC 01/09/2023 5 92    • RBC 01/09/2023 4 60    • Hemoglobin 01/09/2023 14 9    • Hematocrit 01/09/2023 45 9    • MCV 01/09/2023 100 (H)    • MCH 01/09/2023 32 4    • MCHC 01/09/2023 32 5    • RDW 01/09/2023 14 8    • MPV 01/09/2023 10 2    • Platelets 70/46/4397 186    • nRBC 01/09/2023 0    • Neutrophils Relative 01/09/2023 61    • Immat GRANS % 01/09/2023 0    • Lymphocytes Relative 01/09/2023 17    • Monocytes Relative 01/09/2023 15 (H)    • Eosinophils Relative 01/09/2023 6    • Basophils Relative 01/09/2023 1    • Neutrophils Absolute 01/09/2023 3 62    • Immature Grans Absolute 01/09/2023 0 02    • Lymphocytes Absolute 01/09/2023 1 02    • Monocytes Absolute 01/09/2023 0 86    • Eosinophils Absolute 01/09/2023 0 35    • Basophils Absolute 01/09/2023 0 05    • Sodium 01/09/2023 141    • Potassium 01/09/2023 2 9 (L)    • Chloride 01/09/2023 96    • CO2 01/09/2023 39 (H)    • ANION GAP 01/09/2023 6    • BUN 01/09/2023 16    • Creatinine 01/09/2023 1 18    • Glucose 01/09/2023 100    • Calcium 01/09/2023 8 7    • AST 01/09/2023 18    • ALT 01/09/2023 12    • Alkaline Phosphatase 01/09/2023 58    • Total Protein 01/09/2023 5 7 (L)    • Albumin 01/09/2023 3 7    • Total Bilirubin 01/09/2023 0 78 • eGFR 01/09/2023 58    • SARS-CoV-2 01/09/2023 Positive (A)    • INFLUENZA A PCR 01/09/2023 Negative    • INFLUENZA B PCR 01/09/2023 Negative    • RSV PCR 01/09/2023 Negative    • D-Dimer, Quant 01/09/2023 0 45    • hs TnI 0hr 01/09/2023 14    • BNP 01/09/2023 61    • hs TnI 2hr 01/09/2023 13    • Delta 2hr hsTnI 01/09/2023 -1    • Procalcitonin 01/09/2023 0 12    • Sodium 01/10/2023 139    • Potassium 01/10/2023 3 6    • Chloride 01/10/2023 100    • CO2 01/10/2023 31    • ANION GAP 01/10/2023 8    • BUN 01/10/2023 19    • Creatinine 01/10/2023 1 02    • Glucose 01/10/2023 187 (H)    • Calcium 01/10/2023 8 3 (L)    • AST 01/10/2023 17    • ALT 01/10/2023 11    • Alkaline Phosphatase 01/10/2023 55    • Total Protein 01/10/2023 5 5 (L)    • Albumin 01/10/2023 3 6    • Total Bilirubin 01/10/2023 0 71    • eGFR 01/10/2023 70    • WBC 01/10/2023 15 44 (H)    • RBC 01/10/2023 4 58    • Hemoglobin 01/10/2023 14 6    • Hematocrit 01/10/2023 46 1    • MCV 01/10/2023 101 (H)    • MCH 01/10/2023 31 9    • MCHC 01/10/2023 31 7    • RDW 01/10/2023 14 7    • MPV 01/10/2023 10 0    • Platelets 90/14/7646 206    • Protime 01/10/2023 14 0    • INR 01/10/2023 1 08    • Segmented % 01/10/2023 96 (H)    • Bands % 01/10/2023 1    • Lymphocytes % 01/10/2023 1 (L)    • Monocytes % 01/10/2023 2 (L)    • Eosinophils, % 01/10/2023 0    • Basophils % 01/10/2023 0    • Absolute Neutrophils 01/10/2023 14 98 (H)    • Lymphocytes Absolute 01/10/2023 0 15 (L)    • Monocytes Absolute 01/10/2023 0 31    • Eosinophils Absolute 01/10/2023 0 00    • Basophils Absolute 01/10/2023 0 00    • RBC Morphology 01/10/2023 Normal    • Platelet Estimate 41/12/6586 Adequate    • Large Platelet 64/35/9771 Present    • WBC 01/11/2023 14 25 (H)    • RBC 01/11/2023 4 46    • Hemoglobin 01/11/2023 14 4    • Hematocrit 01/11/2023 44 4    • MCV 01/11/2023 100 (H)    • MCH 01/11/2023 32 3    • MCHC 01/11/2023 32 4    • RDW 01/11/2023 14 8    • Platelets 97/64/8364 200    • MPV 01/11/2023 10 2    • Sodium 01/11/2023 139    • Potassium 01/11/2023 3 9    • Chloride 01/11/2023 101    • CO2 01/11/2023 32    • ANION GAP 01/11/2023 6    • BUN 01/11/2023 19    • Creatinine 01/11/2023 1 05    • Glucose 01/11/2023 151 (H)    • Calcium 01/11/2023 8 3 (L)    • AST 01/11/2023 18    • ALT 01/11/2023 12    • Alkaline Phosphatase 01/11/2023 54    • Total Protein 01/11/2023 5 3 (L)    • Albumin 01/11/2023 3 5    • Total Bilirubin 01/11/2023 0 48    • eGFR 01/11/2023 67    • Magnesium 01/11/2023 2 3    • CRP 01/11/2023 1 4         Imaging: X-ray chest 1 view portable    Result Date: 1/18/2023  Narrative: CHEST INDICATION:   cp  COMPARISON:  Multiple prior chest x-rays with the most recent being obtained on 9/20/2023 EXAM PERFORMED/VIEWS:  XR CHEST PORTABLE FINDINGS: Cardiomediastinal silhouette appears unremarkable  Bibasilar groundglass opacities are noted, more prominent on the right  These are similar in appearance to the most recent radiograph of 1/9/2023  No appreciable pneumothorax or pleural effusion  No evidence of acute osseous abnormality  Impression: 1  Redemonstration of bibasilar airspace opacities which appear similar to most recent radiograph of 1/9/2023  Findings are suspicious for bibasilar pneumonia versus atelectasis  The study was marked in [facility] for immediate notification  Workstation performed: [id number]     XR chest 1 view portable    Result Date: 1/9/2023  Narrative: CHEST INDICATION:   sob  COMPARISON:  10/19/2022 EXAM PERFORMED/VIEWS:  XR CHEST PORTABLE Single view FINDINGS: Bibasal groundglass opacities likely infiltrates vs  atelectasis Cardiomediastinal silhouette appears unremarkable  No pneumothorax or pleural effusion  Osseous structures appear within normal limits for patient age       Impression: Recurrent bibasal groundglass opacities suspicious for infiltrate vs  atelectasis Workstation performed: [id number]     MRI brain wo contrast    Result Date: 2/2/2023  Narrative: MRI BRAIN WITHOUT CONTRAST INDICATION: [id number]: Other symptoms and signs involving the musculoskeletal system  COMPARISON:   MRI brain 9/1/2021 TECHNIQUE:  Multiplanar, multisequence imaging of the brain was performed  IMAGE QUALITY:  Diagnostic  FINDINGS: BRAIN PARENCHYMA:  There is no discrete mass, mass effect or midline shift  There is no intracranial hemorrhage  There is no evidence of acute infarction and diffusion imaging is unremarkable  Small scattered hyperintensities on T2/FLAIR imaging are noted in the periventricular and subcortical white matter demonstrating an appearance that is statistically most likely to represent mild microangiopathic change  VENTRICLES:  Normal for the patient's age  SELLA AND PITUITARY GLAND:  Normal  ORBITS:  Bilateral cataract surgery  PARANASAL SINUSES:  Small bilateral maxillary sinus retention cysts are noted  Mild ethmoidal mucosal thickening  Minimal bilateral mastoid fluid is noted  VASCULATURE:  Persistent absence of the left intracranial carotid artery flow void suggestive of slow flow or occlusion  The remainder of the flow voids at the skull base are preserved  CALVARIUM AND SKULL BASE:  Normal  EXTRACRANIAL SOFT TISSUES:  Normal      Impression: 1  No acute infarction, edema, or mass effect  2  Mild chronic microangiopathic ischemic changes  3  Persistent absence of the left intracranial carotid artery flow void suggestive of slow flow or occlusion  MRA of the head and neck can be considered for further correlation  Workstation performed: [id number]     VAS lower limb venous duplex study, unilateral/limited    Result Date: 1/26/2023  Narrative:  [facility] REPORT CLINICAL: Indications: Patient presents with pain of left lower extremity x approximately 5 days, recent hospitalization  Operative History: 2015-01-01 Right CEA Risk Factors The patient has history of HTN and CAD     CONCLUSION:  Impression: RIGHT LOWER LIMB LIMITED: Evaluation shows no evidence of thrombus in the common femoral vein  Doppler evaluation shows a normal response to augmentation maneuvers  LEFT LOWER LIMB: No evidence of acute or chronic deep vein thrombosis No evidence of superficial thrombophlebitis noted  Doppler evaluation shows a normal response to augmentation maneuvers  Popliteal, posterior tibial and anterior tibial arterial Doppler waveforms are triphasic  Technical findings were given to Dr Lisa Shore via [employer]  SIGNATURE: Electronically Signed by: Weston Washington MD on 2023-01-26 03:19:04 PM    Review of Systems:  Review of Systems   Constitutional: Positive for fatigue  Negative for chills, diaphoresis and fever  HENT: Negative for trouble swallowing and voice change  Eyes: Negative for pain and redness  Respiratory: Positive for cough and shortness of breath  Negative for wheezing  Cardiovascular: Negative for chest pain, palpitations and leg swelling  Gastrointestinal: Negative for abdominal pain, constipation, diarrhea, nausea and vomiting  Genitourinary: Negative for dysuria  Musculoskeletal: Positive for arthralgias  Negative for neck pain and neck stiffness  Skin: Negative for rash  Neurological: Negative for dizziness, syncope, light-headedness and headaches  Psychiatric/Behavioral: Negative for agitation and confusion  All other systems reviewed and are negative  Vitals:    02/03/23 1324   BP: 110/60   Pulse: 90   SpO2: 94%   Weight: 94 3 kg (208 lb)   Height: 5' 8" (1 727 m)     Vitals:    02/03/23 1324   Weight: 94 3 kg (208 lb)     Height: 5' 8" (172 7 cm)     Physical Exam:  Physical Exam  Vitals reviewed  Constitutional:       General: He is not in acute distress  Appearance: He is obese  He is not diaphoretic  HENT:      Head: Normocephalic and atraumatic  Comments: Nasal cannula oxygen in place  Eyes:      General:         Right eye: No discharge  Left eye: No discharge     Neck: Comments: Trachea midline, no JVD present  Cardiovascular:      Rate and Rhythm: Normal rate and regular rhythm  Heart sounds: Murmur (BRENDEN) heard  Pulmonary:      Effort: Pulmonary effort is normal  No respiratory distress  Breath sounds: Wheezing present  Chest:      Chest wall: No tenderness  Abdominal:      General: Bowel sounds are normal       Palpations: Abdomen is soft  Tenderness: There is no abdominal tenderness  There is no rebound  Musculoskeletal:      Right lower leg: No edema  Left lower leg: No edema  Skin:     General: Skin is warm and dry  Neurological:      Mental Status: He is alert  Comments: Awake, alert, able to answer questions appropriately, able to move extremities bilaterally     Psychiatric:         Mood and Affect: Mood normal          Behavior: Behavior normal

## 2023-02-06 VITALS
HEART RATE: 78 BPM | OXYGEN SATURATION: 94 % | DIASTOLIC BLOOD PRESSURE: 60 MMHG | SYSTOLIC BLOOD PRESSURE: 136 MMHG | RESPIRATION RATE: 22 BRPM | TEMPERATURE: 97.3 F

## 2023-02-06 DIAGNOSIS — I65.23 BILATERAL CAROTID ARTERY STENOSIS: Primary | ICD-10-CM

## 2023-02-09 ENCOUNTER — HOME CARE VISIT (OUTPATIENT)
Dept: HOME HEALTH SERVICES | Facility: HOME HEALTHCARE | Age: 78
End: 2023-02-09

## 2023-02-10 ENCOUNTER — TELEPHONE (OUTPATIENT)
Dept: PAIN MEDICINE | Facility: CLINIC | Age: 78
End: 2023-02-10

## 2023-02-10 DIAGNOSIS — J44.1 CHRONIC OBSTRUCTIVE PULMONARY DISEASE WITH ACUTE EXACERBATION (HCC): ICD-10-CM

## 2023-02-10 DIAGNOSIS — G58.8 INTERCOSTAL NEURALGIA: ICD-10-CM

## 2023-02-10 DIAGNOSIS — J42 CHRONIC BRONCHITIS, UNSPECIFIED CHRONIC BRONCHITIS TYPE (HCC): ICD-10-CM

## 2023-02-10 DIAGNOSIS — M54.6 CHRONIC RIGHT-SIDED THORACIC BACK PAIN: ICD-10-CM

## 2023-02-10 DIAGNOSIS — G89.4 CHRONIC PAIN SYNDROME: ICD-10-CM

## 2023-02-10 DIAGNOSIS — G89.29 CHRONIC RIGHT-SIDED THORACIC BACK PAIN: ICD-10-CM

## 2023-02-10 DIAGNOSIS — K21.9 GASTROESOPHAGEAL REFLUX DISEASE, UNSPECIFIED WHETHER ESOPHAGITIS PRESENT: Chronic | ICD-10-CM

## 2023-02-10 NOTE — TELEPHONE ENCOUNTER
Caller: Oliver    Doctor: Yash Stoddard     Reason for call: wants to schedule procedure     Call back#: [phone]

## 2023-02-10 NOTE — TELEPHONE ENCOUNTER
Patient requesting refill(s) of:  Pulmicort solution   Last filled:1/25/23  Last appt:1/25/23  Next appt:3/3/23  Pharmacy:[facility]    Patient requesting refill(s) of:  Gabapentin 300 mg  Last filled:1/11/23    Patient requesting refill(s) of:  Pepcid 20mg  Last filled:1/25/23    Patient requesting refill(s) of:  Azithromycin 500 mg (three times per week) patient stated that he has been on this for some time,   Last filled:11/28/22

## 2023-02-11 ENCOUNTER — HOSPITAL ENCOUNTER (OUTPATIENT)
Dept: CT IMAGING | Facility: HOSPITAL | Age: 78
Discharge: HOME | End: 2023-02-11
Attending: INTERNAL MEDICINE

## 2023-02-11 DIAGNOSIS — I65.23 BILATERAL CAROTID ARTERY STENOSIS: ICD-10-CM

## 2023-02-11 RX ADMIN — IOHEXOL 80 ML: 350 INJECTION, SOLUTION INTRAVENOUS at 13:15

## 2023-02-13 ENCOUNTER — OFFICE VISIT (OUTPATIENT)
Dept: PULMONOLOGY | Facility: CLINIC | Age: 78
End: 2023-02-13

## 2023-02-13 VITALS
BODY MASS INDEX: 31.31 KG/M2 | TEMPERATURE: 98.5 F | WEIGHT: 206.6 LBS | HEIGHT: 68 IN | DIASTOLIC BLOOD PRESSURE: 53 MMHG | OXYGEN SATURATION: 83 % | SYSTOLIC BLOOD PRESSURE: 109 MMHG | HEART RATE: 98 BPM

## 2023-02-13 VITALS
TEMPERATURE: 97.5 F | RESPIRATION RATE: 20 BRPM | OXYGEN SATURATION: 91 % | SYSTOLIC BLOOD PRESSURE: 132 MMHG | DIASTOLIC BLOOD PRESSURE: 64 MMHG | HEART RATE: 76 BPM

## 2023-02-13 DIAGNOSIS — J44.9 CHRONIC OBSTRUCTIVE PULMONARY DISEASE, UNSPECIFIED COPD TYPE (HCC): Primary | ICD-10-CM

## 2023-02-13 DIAGNOSIS — G47.33 OBSTRUCTIVE SLEEP APNEA SYNDROME IN ADULT: ICD-10-CM

## 2023-02-13 DIAGNOSIS — J96.11 CHRONIC RESPIRATORY FAILURE WITH HYPOXIA (HCC): ICD-10-CM

## 2023-02-13 DIAGNOSIS — J44.1 COPD WITH EXACERBATION (HCC): ICD-10-CM

## 2023-02-13 RX ORDER — GUAIFENESIN/DEXTROMETHORPHAN 100-10MG/5
5 SYRUP ORAL 3 TIMES DAILY PRN
Qty: 354 ML | Refills: 0 | Status: SHIPPED | OUTPATIENT
Start: 2023-02-13

## 2023-02-13 RX ORDER — ARFORMOTEROL TARTRATE 15 UG/2ML
15 SOLUTION RESPIRATORY (INHALATION) 2 TIMES DAILY
Qty: 360 ML | Refills: 3 | Status: SHIPPED | OUTPATIENT
Start: 2023-02-13 | End: 2023-02-23

## 2023-02-13 RX ORDER — PREDNISONE 10 MG/1
TABLET ORAL
Qty: 30 TABLET | Refills: 0 | Status: SHIPPED | OUTPATIENT
Start: 2023-02-13 | End: 2023-02-23

## 2023-02-13 NOTE — ASSESSMENT & PLAN NOTE
In addition to the above will add  · Prednisone taper starting at 40 mg and tapering by 10 mg every 3 days until completion  · Robitussin-DM twice daily as needed  · Will check CXR and sputum culture before considering adding additional ABX

## 2023-02-13 NOTE — ASSESSMENT & PLAN NOTE
Reports compliance on home CPAP with O2 bled in  Continue at current settings  No compliance for me to review today

## 2023-02-13 NOTE — TELEPHONE ENCOUNTER
LMOM for pt to call back  Has procedure scheduled 1/9/23 but was in the ED and diagnosed with COVID   Need to verify he wants to reschedule the same procedure

## 2023-02-13 NOTE — ASSESSMENT & PLAN NOTE
Continue 2 L nasal cannula at rest   Increased to 3 L nasal cannula with activities until seen in the office at next visit

## 2023-02-13 NOTE — PROGRESS NOTES
Pulmonary Follow Up Note   Nona Moritz 65 y o  male MRN: [id number]  2/13/2023      Assessment:    COPD (chronic obstructive pulmonary disease) ([facility])  Pulmonary regimen should include:  · Budesonide twice daily  · Brovana twice daily  · Spiriva 2 puffs daily   · As needed albuterol HFA/nebs every 6 hours as needed    COPD with exacerbation ([facility] )  In addition to the above will add  · Prednisone taper starting at 40 mg and tapering by 10 mg every 3 days until completion  · Robitussin-DM twice daily as needed  · Will check CXR and sputum culture before considering adding additional ABX  Obstructive sleep apnea syndrome in adult  Reports compliance on home CPAP with O2 bled in  Continue at current settings  No compliance for me to review today  Chronic respiratory failure with hypoxia (HCC)  Continue 2 L nasal cannula at rest   Increased to 3 L nasal cannula with activities until seen in the office at next visit  Plan:    Diagnoses and all orders for this visit:    Chronic obstructive pulmonary disease, unspecified COPD type ([facility] )  -     arformoterol (BROVANA) 15 mcg/2 mL nebulizer solution; Take 2 mL (15 mcg total) by nebulization 2 (two) times a day    COPD with exacerbation ([facility])  -     Sputum culture and Gram stain; Future  -     XR chest pa & lateral; Future  -     predniSONE 10 mg tablet; Take 4 tabs daily for 3 days then 3 tabs daily for 3 days then 2 tabs daily for 3 days then 1 tab daily for 3 days  -     dextromethorphan-guaiFENesin (ROBITUSSIN DM)  mg/5 mL syrup; Take 5 mL by mouth 3 (three) times a day as needed for cough    Obstructive sleep apnea syndrome in adult    Chronic respiratory failure with hypoxia (HCC)        No follow-ups on file  History of Present Illness   HPI:  Nona Moritz is a 65 y o  male who presents the office today for routine follow-up  Patient has a past medical asbestos for COPD, LAMBERTO, chronic hypoxic respiratory failure    Patient follows with   John  Seen in the last 3 months since he was last seen patient did have 1 hospitalization for influenza A    he was treated with systemic steroids and supportively however patient does now feel that he is never fully recovered  He complains of chronic dyspnea and occasional cough that is  productive of yellow sputum  He also endorses increased wheeze especially at night when he is laying down on his right side  Denies chest pain or palpitations  No fevers or chills  Review of Systems   All other systems reviewed and are negative  Historical Information   Past Medical History:   Diagnosis Date   • Bilateral carotid artery stenosis    • Cancer (HCC)     skin   • Chronic diastolic heart failure (HCC)    • Chronic ischemic heart disease    • Chronic kidney disease     stage 3   • Colon polyp    • COPD (chronic obstructive pulmonary disease) ([facility])    • Coronary artery disease     hx stents, MI, PCI   • COVID 11/2021   • CPAP (continuous positive airway pressure) dependence    • Emphysema of lung ([facility] ) 1995 started   • Hearing loss    • History of transfusion 1995   • Hyperlipidemia    • Hypertension    • MI (myocardial infarction) ([facility] ) 1995   • Myocardial infarction ([facility] ) 1995   • Pneumonia    • Prostate cancer ([facility] )    • RSV (respiratory syncytial virus infection) 10/2022   • S/P carotid endarterectomy    • Shortness of breath     O2 2 l/nc PRN   • Sleep apnea    • Sleep apnea, obstructive    • Stented coronary artery      Past Surgical History:   Procedure Laterality Date   • APPENDECTOMY     • CARDIAC CATHETERIZATION  03/18/2021    left main with no significant disease, proximal LAD with 10% stenosis at the site of prior stent, left circumflex artery with minimal luminal irregularities mid RCA with 50% stenosis at site of prior stent and PL segment with distal disease supplied by collaterals from the distal circumflex with no significant CAD requiring revascularization at that time     • CATARACT EXTRACTION, BILATERAL Bilateral    • COLONOSCOPY     • CORONARY ANGIOPLASTY WITH STENT PLACEMENT      RCA   • CORONARY ANGIOPLASTY WITH STENT PLACEMENT      RCA   • CORONARY ANGIOPLASTY WITH STENT PLACEMENT      LAD   • EYE SURGERY     • OR BX PROSTATE STRTCTC SATURATION SAMPLING IMG GID N/A 2022    Procedure: TRANSPERINEAL MRI FUSION  BIOPSY PROSTATE;  Surgeon: Pastor Kwabena MD;  Location: [facility];  Service: Urology   • OR NEUROPLASTY &/TRANSPOS MEDIAN NRV Lottie Chandrakant Left 2022    Procedure: RELEASE CARPAL TUNNEL;  Surgeon: Guille Kebede MD;  Location: [facility];  Service: Orthopedics   • OR NEUROPLASTY &/TRANSPOSITION ULNAR NERVE ELBOW Left 2022    Procedure: Neoma Erm;  Surgeon: Guille Kebede MD;  Location: [facility];  Service: Orthopedics   • OR NEUROPLASTY &/TRANSPOSITION ULNAR NERVE WRIST Left 2022    Procedure: Gearld Cords;  Surgeon: Guille Kebede MD;  Location: [facility];  Service: Orthopedics   • SKIN CANCER EXCISION      chin-per pt, basal cell  also right ankle     Family History   Problem Relation Age of Onset   • Heart attack Mother    • Dementia Mother    • Heart failure Mother             • No Known Problems Father        Social History     Tobacco Use   Smoking Status Former   • Packs/day: 2 00   • Years: 35 00   • Pack years: 70 00   • Types: Cigarettes   • Start date: 1960   • Quit date: 1995   • Years since quittin 6   Smokeless Tobacco Never   Tobacco Comments    stopped          Meds/Allergies     Current Outpatient Medications:   •  albuterol (2 5 mg/3 mL) 0 083 % nebulizer solution, Take 3 mL (2 5 mg total) by nebulization every 6 (six) hours as needed for wheezing or shortness of breath, Disp: 60 mL, Rfl: 1  •  arformoterol (BROVANA) 15 mcg/2 mL nebulizer solution, Take 2 mL (15 mcg total) by nebulization 2 (two) times a day, Disp: 360 mL, Rfl: 3  •  aspirin 81 mg chewable tablet, CHEW ONE TABLET BY MOUTH EVERY DAY, Disp: , Rfl:   •  benzonatate (TESSALON) 200 MG capsule, Take 1 capsule (200 mg total) by mouth 3 (three) times a day as needed for cough, Disp: 20 capsule, Rfl: 0  •  Blood Pressure Monitor KIT, Use daily (Patient taking differently: Use 40 mg daily), Disp: 1 kit, Rfl: 0  •  budesonide (PULMICORT) 0 5 mg/2 mL nebulizer solution, Take 2 mL (0 5 mg total) by nebulization every 12 (twelve) hours Rinse mouth after use , Disp: 120 mL, Rfl: 0  •  dextromethorphan-guaiFENesin (ROBITUSSIN DM)  mg/5 mL syrup, Take 5 mL by mouth 3 (three) times a day as needed for cough, Disp: 354 mL, Rfl: 0  •  famotidine (PEPCID) 20 mg tablet, Take 1 tablet (20 mg total) by mouth daily at bedtime, Disp: 100 tablet, Rfl: 1  •  fluticasone (FLONASE) 50 mcg/act nasal spray, into each nostril as needed , Disp: , Rfl:   •  furosemide (LASIX) 40 mg tablet, Take 1 tablet (40 mg total) by mouth daily, Disp: 100 tablet, Rfl: 3  •  gabapentin (NEURONTIN) 300 mg capsule, Take 2 capsules (600 mg total) by mouth 2 (two) times a day, Disp: 60 capsule, Rfl: 0  •  metoprolol succinate (TOPROL-XL) 25 mg 24 hr tablet, Take 0 5 tablets (12 5 mg total) by mouth 2 (two) times a day, Disp: 30 tablet, Rfl: 0  •  oxygen gas, Inhale 2 L/min continuous   Indications: SOB, pulmonary edema suspected, Disp: , Rfl:   •  potassium chloride (Klor-Con) 10 mEq tablet, Take 2 tablets (20 mEq total) by mouth daily, Disp: 200 tablet, Rfl: 3  •  predniSONE 10 mg tablet, Take 4 tabs daily for 3 days then 3 tabs daily for 3 days then 2 tabs daily for 3 days then 1 tab daily for 3 days, Disp: 30 tablet, Rfl: 0  •  rosuvastatin (CRESTOR) 40 MG tablet, TAKE 1 TABLET DAILY (Patient taking differently: Take 40 mg by mouth daily at bedtime), Disp: 100 tablet, Rfl: 3  •  Tiotropium Bromide Monohydrate (SPIRIVA RESPIMAT IN), Inhale every morning, Disp: , Rfl:   Allergies   Allergen Reactions   • Lisinopril Swelling and Cough   • Tetanus Antitoxin Anaphylaxis   • Tetanus Toxoid Anaphylaxis and Swelling       Vitals: Blood pressure 109/53, pulse 98, temperature 98 5 °F (36 9 °C), temperature source Tympanic, height 5' 8" (1 727 m), weight 93 7 kg (206 lb 9 6 oz), SpO2 (!) 83 %  Body mass index is 31 41 kg/m²  Oxygen Therapy  SpO2: (!) 83 %  Oxygen Therapy: Supplemental oxygen  O2 Delivery Method: Nasal cannula  O2 Flow Rate (L/min): 2 L/min    Physical Exam  Physical Exam  Vitals reviewed  Constitutional:       Appearance: Normal appearance  He is well-developed  HENT:      Head: Normocephalic and atraumatic  Nose: Nose normal       Mouth/Throat:      Mouth: Mucous membranes are moist    Eyes:      Extraocular Movements: Extraocular movements intact  Cardiovascular:      Rate and Rhythm: Normal rate and regular rhythm  Heart sounds: Normal heart sounds  Pulmonary:      Effort: Pulmonary effort is normal  No respiratory distress  Breath sounds: No wheezing, rhonchi or rales  Musculoskeletal:         General: No swelling  Cervical back: Normal range of motion and neck supple  Skin:     General: Skin is warm and dry  Neurological:      General: No focal deficit present  Mental Status: He is alert  Mental status is at baseline  Psychiatric:         Mood and Affect: Mood normal          Behavior: Behavior normal          Labs: I have personally reviewed pertinent lab results  , ABG: No results found for: PHART, LAJ1YEC, PO2ART, GHS6LNZ, W2OQJVLI, BEART, SOURCE, BNP: No results found for: BNP, CBC: No results found for: WBC, HGB, HCT, MCV, PLT, ADJUSTEDWBC, MCH, MCHC, RDW, MPV, NRBC, CMP: No results found for: SODIUM, K, CL, CO2, ANIONGAP, BUN, CREATININE, GLUCOSE, CALCIUM, AST, ALT, ALKPHOS, PROT, BILITOT, EGFR, PT/INR: No results found for: PT, INR, Troponin: No results found for: TROPONINI  Lab Results   Component Value Date    WBC 6 79 01/27/2023    HGB 16 4 01/27/2023    HCT 50 5 (H) 01/27/2023     (H) 01/27/2023  01/27/2023     Lab Results   Component Value Date    CALCIUM 9 8 01/27/2023    K 3 8 01/27/2023    CO2 35 (H) 01/27/2023     01/27/2023    BUN 15 01/27/2023    CREATININE 1 11 01/27/2023     Lab Results   Component Value Date    IGE 88 1 08/29/2022     Lab Results   Component Value Date    ALT 31 01/27/2023    AST 27 01/27/2023    ALKPHOS 77 01/27/2023       Imaging and other studies: I have personally reviewed pertinent reports     and I have personally reviewed pertinent films in PACS     Chest x-ray 1/9/2023 shows recurrent bibasilar groundglass opacities

## 2023-02-13 NOTE — ASSESSMENT & PLAN NOTE
Pulmonary regimen should include:  · Budesonide twice daily  · Brovana twice daily  · Spiriva 2 puffs daily   · As needed albuterol HFA/nebs every 6 hours as needed

## 2023-02-13 NOTE — TELEPHONE ENCOUNTER
Caller: Shlomo    Doctor/Office: Regi Cevallos     Call regarding :  Procedure      Call was transferred to: Nurse

## 2023-02-14 RX ORDER — BUDESONIDE 0.5 MG/2ML
0.5 INHALANT ORAL
Qty: 120 ML | Refills: 0 | Status: ON HOLD | OUTPATIENT
Start: 2023-02-14 | End: 2023-02-22 | Stop reason: SDUPTHER

## 2023-02-14 RX ORDER — AZITHROMYCIN 500 MG/1
500 TABLET, FILM COATED ORAL 3 TIMES WEEKLY
Qty: 12 TABLET | Refills: 2 | Status: SHIPPED | OUTPATIENT
Start: 2023-02-15 | End: 2023-05-16

## 2023-02-14 RX ORDER — FAMOTIDINE 20 MG/1
20 TABLET, FILM COATED ORAL
Qty: 100 TABLET | Refills: 1 | Status: SHIPPED | OUTPATIENT
Start: 2023-02-14

## 2023-02-14 RX ORDER — GABAPENTIN 300 MG/1
600 CAPSULE ORAL 2 TIMES DAILY
Qty: 60 CAPSULE | Refills: 0 | Status: SHIPPED | OUTPATIENT
Start: 2023-02-14 | End: 2023-02-14

## 2023-02-14 RX ORDER — GABAPENTIN 300 MG/1
600 CAPSULE ORAL 2 TIMES DAILY
Qty: 400 CAPSULE | Refills: 1 | Status: ON HOLD | OUTPATIENT
Start: 2023-02-14 | End: 2023-02-23 | Stop reason: SDUPTHER

## 2023-02-16 ENCOUNTER — HOME CARE VISIT (OUTPATIENT)
Dept: HOME HEALTH SERVICES | Facility: HOME HEALTHCARE | Age: 78
End: 2023-02-16

## 2023-02-17 DIAGNOSIS — I67.1 BRAIN ANEURYSM: Primary | ICD-10-CM

## 2023-02-17 DIAGNOSIS — I65.22 OCCLUSION OF LEFT INTERNAL CAROTID ARTERY: ICD-10-CM

## 2023-02-20 ENCOUNTER — NURSE TRIAGE (OUTPATIENT)
Dept: OTHER | Facility: OTHER | Age: 78
End: 2023-02-20

## 2023-02-20 ENCOUNTER — TELEPHONE (OUTPATIENT)
Dept: PAIN MEDICINE | Facility: MEDICAL CENTER | Age: 78
End: 2023-02-20

## 2023-02-20 ENCOUNTER — HOSPITAL ENCOUNTER (INPATIENT)
Facility: HOSPITAL | Age: 78
LOS: 3 days | Discharge: HOME | End: 2023-02-23
Attending: EMERGENCY MEDICINE | Admitting: HOSPITALIST

## 2023-02-20 ENCOUNTER — APPOINTMENT (EMERGENCY)
Dept: RADIOLOGY | Facility: HOSPITAL | Age: 78
End: 2023-02-20

## 2023-02-20 ENCOUNTER — PROCEDURE VISIT (OUTPATIENT)
Dept: PAIN MEDICINE | Facility: CLINIC | Age: 78
End: 2023-02-20

## 2023-02-20 ENCOUNTER — APPOINTMENT (OUTPATIENT)
Dept: RADIOLOGY | Facility: HOSPITAL | Age: 78
End: 2023-02-20

## 2023-02-20 ENCOUNTER — APPOINTMENT (INPATIENT)
Dept: RADIOLOGY | Facility: HOSPITAL | Age: 78
End: 2023-02-20

## 2023-02-20 VITALS
RESPIRATION RATE: 22 BRPM | HEART RATE: 84 BPM | TEMPERATURE: 97.9 F | OXYGEN SATURATION: 95 % | SYSTOLIC BLOOD PRESSURE: 116 MMHG | DIASTOLIC BLOOD PRESSURE: 62 MMHG

## 2023-02-20 DIAGNOSIS — G58.8 INTERCOSTAL NEURALGIA: Primary | ICD-10-CM

## 2023-02-20 DIAGNOSIS — J93.9 PNEUMOTHORAX ON RIGHT: Primary | ICD-10-CM

## 2023-02-20 DIAGNOSIS — R09.02 HYPOXIA: ICD-10-CM

## 2023-02-20 DIAGNOSIS — G58.8 INTERCOSTAL NEURALGIA: ICD-10-CM

## 2023-02-20 DIAGNOSIS — G89.29 CHRONIC RIGHT-SIDED THORACIC BACK PAIN: ICD-10-CM

## 2023-02-20 DIAGNOSIS — J44.1 COPD WITH EXACERBATION (HCC): ICD-10-CM

## 2023-02-20 DIAGNOSIS — M54.6 CHRONIC RIGHT-SIDED THORACIC BACK PAIN: ICD-10-CM

## 2023-02-20 DIAGNOSIS — G89.4 CHRONIC PAIN SYNDROME: ICD-10-CM

## 2023-02-20 LAB
ANION GAP SERPL CALCULATED.3IONS-SCNC: 5 MMOL/L (ref 4–13)
ANISOCYTOSIS BLD QL SMEAR: PRESENT
BASOPHILS # BLD MANUAL: 0 THOUSAND/UL (ref 0–0.1)
BASOPHILS NFR MAR MANUAL: 0 % (ref 0–1)
BUN SERPL-MCNC: 26 MG/DL (ref 5–25)
BURR CELLS BLD QL SMEAR: PRESENT
CALCIUM SERPL-MCNC: 9.3 MG/DL (ref 8.4–10.2)
CHLORIDE SERPL-SCNC: 96 MMOL/L (ref 96–108)
CO2 SERPL-SCNC: 39 MMOL/L (ref 21–32)
CREAT SERPL-MCNC: 1.29 MG/DL (ref 0.6–1.3)
DACRYOCYTES BLD QL SMEAR: PRESENT
EOSINOPHIL # BLD MANUAL: 0 THOUSAND/UL (ref 0–0.4)
EOSINOPHIL NFR BLD MANUAL: 0 % (ref 0–6)
ERYTHROCYTE [DISTWIDTH] IN BLOOD BY AUTOMATED COUNT: 13.7 % (ref 11.6–15.1)
GFR SERPL CREATININE-BSD FRML MDRD: 52 ML/MIN/1.73SQ M
GLUCOSE SERPL-MCNC: 196 MG/DL (ref 65–140)
HCT VFR BLD AUTO: 50.7 % (ref 36.5–49.3)
HGB BLD-MCNC: 15.9 G/DL (ref 12–17)
LG PLATELETS BLD QL SMEAR: PRESENT
LYMPHOCYTES # BLD AUTO: 0.56 THOUSAND/UL (ref 0.6–4.47)
LYMPHOCYTES # BLD AUTO: 2 % (ref 14–44)
MCH RBC QN AUTO: 32.7 PG (ref 26.8–34.3)
MCHC RBC AUTO-ENTMCNC: 31.4 G/DL (ref 31.4–37.4)
MCV RBC AUTO: 104 FL (ref 82–98)
MONOCYTES # BLD AUTO: 1.69 THOUSAND/UL (ref 0–1.22)
MONOCYTES NFR BLD: 6 % (ref 4–12)
NEUTROPHILS # BLD MANUAL: 25.93 THOUSAND/UL (ref 1.85–7.62)
NEUTS BAND NFR BLD MANUAL: 3 % (ref 0–8)
NEUTS SEG NFR BLD AUTO: 89 % (ref 43–75)
PLATELET # BLD AUTO: 210 THOUSANDS/UL (ref 149–390)
PLATELET BLD QL SMEAR: ADEQUATE
PMV BLD AUTO: 11.9 FL (ref 8.9–12.7)
POTASSIUM SERPL-SCNC: 3.6 MMOL/L (ref 3.5–5.3)
RBC # BLD AUTO: 4.86 MILLION/UL (ref 3.88–5.62)
RBC MORPH BLD: PRESENT
SODIUM SERPL-SCNC: 140 MMOL/L (ref 135–147)
WBC # BLD AUTO: 28.19 THOUSAND/UL (ref 4.31–10.16)

## 2023-02-20 PROCEDURE — 0W9930Z DRAINAGE OF RIGHT PLEURAL CAVITY WITH DRAINAGE DEVICE, PERCUTANEOUS APPROACH: ICD-10-PCS | Performed by: INTERNAL MEDICINE

## 2023-02-20 RX ORDER — GABAPENTIN 300 MG/1
600 CAPSULE ORAL 2 TIMES DAILY
Status: DISCONTINUED | OUTPATIENT
Start: 2023-02-21 | End: 2023-02-22

## 2023-02-20 RX ORDER — ATORVASTATIN CALCIUM 40 MG/1
80 TABLET, FILM COATED ORAL
Status: DISCONTINUED | OUTPATIENT
Start: 2023-02-21 | End: 2023-02-23 | Stop reason: HOSPADM

## 2023-02-20 RX ORDER — GUAIFENESIN 600 MG/1
600 TABLET, EXTENDED RELEASE ORAL EVERY 12 HOURS SCHEDULED
Status: DISCONTINUED | OUTPATIENT
Start: 2023-02-20 | End: 2023-02-21

## 2023-02-20 RX ORDER — BENZONATATE 100 MG/1
200 CAPSULE ORAL 3 TIMES DAILY PRN
Status: DISCONTINUED | OUTPATIENT
Start: 2023-02-20 | End: 2023-02-23 | Stop reason: HOSPADM

## 2023-02-20 RX ORDER — FUROSEMIDE 40 MG/1
40 TABLET ORAL DAILY
Status: DISCONTINUED | OUTPATIENT
Start: 2023-02-21 | End: 2023-02-23 | Stop reason: HOSPADM

## 2023-02-20 RX ORDER — FENTANYL CITRATE 50 UG/ML
25 INJECTION, SOLUTION INTRAMUSCULAR; INTRAVENOUS ONCE
Status: COMPLETED | OUTPATIENT
Start: 2023-02-20 | End: 2023-02-20

## 2023-02-20 RX ORDER — FENTANYL CITRATE 50 UG/ML
INJECTION, SOLUTION INTRAMUSCULAR; INTRAVENOUS
Status: COMPLETED
Start: 2023-02-20 | End: 2023-02-20

## 2023-02-20 RX ORDER — METOPROLOL SUCCINATE 25 MG/1
12.5 TABLET, EXTENDED RELEASE ORAL 2 TIMES DAILY
Status: DISCONTINUED | OUTPATIENT
Start: 2023-02-20 | End: 2023-02-23 | Stop reason: HOSPADM

## 2023-02-20 RX ORDER — ASPIRIN 81 MG/1
81 TABLET, CHEWABLE ORAL ONCE
Status: COMPLETED | OUTPATIENT
Start: 2023-02-20 | End: 2023-02-21

## 2023-02-20 RX ORDER — FLUTICASONE PROPIONATE 50 MCG
1 SPRAY, SUSPENSION (ML) NASAL DAILY
Status: DISCONTINUED | OUTPATIENT
Start: 2023-02-21 | End: 2023-02-23 | Stop reason: HOSPADM

## 2023-02-20 RX ORDER — AZITHROMYCIN 250 MG/1
500 TABLET, FILM COATED ORAL 3 TIMES WEEKLY
Status: DISCONTINUED | OUTPATIENT
Start: 2023-02-20 | End: 2023-02-23 | Stop reason: HOSPADM

## 2023-02-20 RX ORDER — HEPARIN SODIUM 5000 [USP'U]/ML
5000 INJECTION, SOLUTION INTRAVENOUS; SUBCUTANEOUS EVERY 8 HOURS SCHEDULED
Status: DISCONTINUED | OUTPATIENT
Start: 2023-02-20 | End: 2023-02-23 | Stop reason: HOSPADM

## 2023-02-20 RX ORDER — BUDESONIDE 0.5 MG/2ML
0.5 INHALANT ORAL
Status: DISCONTINUED | OUTPATIENT
Start: 2023-02-20 | End: 2023-02-23 | Stop reason: HOSPADM

## 2023-02-20 RX ORDER — LORAZEPAM 2 MG/ML
0.5 INJECTION INTRAMUSCULAR ONCE
Status: COMPLETED | OUTPATIENT
Start: 2023-02-20 | End: 2023-02-20

## 2023-02-20 RX ORDER — PREDNISONE 10 MG/1
10 TABLET ORAL DAILY
Status: DISCONTINUED | OUTPATIENT
Start: 2023-02-21 | End: 2023-02-21

## 2023-02-20 RX ORDER — HYDROMORPHONE HCL IN WATER/PF 6 MG/30 ML
0.2 PATIENT CONTROLLED ANALGESIA SYRINGE INTRAVENOUS
Status: DISCONTINUED | OUTPATIENT
Start: 2023-02-20 | End: 2023-02-21

## 2023-02-20 RX ORDER — LIDOCAINE HYDROCHLORIDE 10 MG/ML
10 INJECTION, SOLUTION EPIDURAL; INFILTRATION; INTRACAUDAL; PERINEURAL ONCE
Status: COMPLETED | OUTPATIENT
Start: 2023-02-20 | End: 2023-02-20

## 2023-02-20 RX ORDER — OXYCODONE HYDROCHLORIDE 10 MG/1
10 TABLET ORAL EVERY 6 HOURS PRN
Status: DISCONTINUED | OUTPATIENT
Start: 2023-02-20 | End: 2023-02-23 | Stop reason: HOSPADM

## 2023-02-20 RX ORDER — OXYCODONE HYDROCHLORIDE 5 MG/1
5 TABLET ORAL EVERY 6 HOURS PRN
Status: DISCONTINUED | OUTPATIENT
Start: 2023-02-20 | End: 2023-02-23 | Stop reason: HOSPADM

## 2023-02-20 RX ORDER — ACETAMINOPHEN 325 MG/1
975 TABLET ORAL EVERY 8 HOURS SCHEDULED
Status: DISCONTINUED | OUTPATIENT
Start: 2023-02-20 | End: 2023-02-23 | Stop reason: HOSPADM

## 2023-02-20 RX ORDER — FAMOTIDINE 20 MG/1
20 TABLET, FILM COATED ORAL
Status: DISCONTINUED | OUTPATIENT
Start: 2023-02-20 | End: 2023-02-23 | Stop reason: HOSPADM

## 2023-02-20 RX ORDER — FENTANYL CITRATE 50 UG/ML
50 INJECTION, SOLUTION INTRAMUSCULAR; INTRAVENOUS ONCE
Status: COMPLETED | OUTPATIENT
Start: 2023-02-20 | End: 2023-02-21

## 2023-02-20 RX ORDER — FENTANYL CITRATE 50 UG/ML
50 INJECTION, SOLUTION INTRAMUSCULAR; INTRAVENOUS ONCE
Status: COMPLETED | OUTPATIENT
Start: 2023-02-20 | End: 2023-02-20

## 2023-02-20 RX ORDER — FORMOTEROL FUMARATE 20 UG/2ML
20 SOLUTION RESPIRATORY (INHALATION)
Status: DISCONTINUED | OUTPATIENT
Start: 2023-02-20 | End: 2023-02-21

## 2023-02-20 RX ADMIN — LIDOCAINE HYDROCHLORIDE 10 ML: 10 INJECTION, SOLUTION EPIDURAL; INFILTRATION; INTRACAUDAL at 21:45

## 2023-02-20 RX ADMIN — FENTANYL CITRATE 25 MCG: 50 INJECTION, SOLUTION INTRAMUSCULAR; INTRAVENOUS at 21:45

## 2023-02-20 RX ADMIN — FENTANYL CITRATE 25 MCG: 50 INJECTION, SOLUTION INTRAMUSCULAR; INTRAVENOUS at 22:23

## 2023-02-20 RX ADMIN — LORAZEPAM 0.5 MG: 2 INJECTION INTRAMUSCULAR; INTRAVENOUS at 21:44

## 2023-02-20 RX ADMIN — FENTANYL CITRATE 50 MCG: 50 INJECTION, SOLUTION INTRAMUSCULAR; INTRAVENOUS at 22:18

## 2023-02-20 NOTE — TELEPHONE ENCOUNTER
Caller: patient    Doctor: Favian Pruitt    Reason for call: patient is experiencing right shoulder pain, after procedure  Pain level 6  Call back#:

## 2023-02-20 NOTE — PROGRESS NOTES
Nerve block    Date/Time: 2/20/2023 8:12 AM  Performed by: Sergo Dasilva MD  Authorized by: Sergo Dasilva MD     Patient location:  [facility] Protocol:  Consent: Verbal consent obtained  Written consent obtained  Risks and benefits: risks, benefits and alternatives were discussed  Consent given by: patient  Time out: Immediately prior to procedure a "time out" was called to verify the correct patient, procedure, equipment, support staff and site/side marked as required  Timeout called at: 2/20/2023 8:28 AM   Patient understanding: patient states understanding of the procedure being performed  Patient consent: the patient's understanding of the procedure matches consent given  Procedure consent: procedure consent matches procedure scheduled  Relevant documents: relevant documents present and verified  Test results: test results available and properly labeled  Site marked: the operative site was marked  Radiology Images displayed and confirmed  If images not available, report reviewed: imaging studies not available  Required items: required blood products, implants, devices, and special equipment available  Patient identity confirmed: verbally with patient      Indications:     Indications:  Pain relief  Location:     Body area:  Trunk    Trunk nerve: intercostal      Intercostal Nerve Block Count:  Multiple    Nerve type:  Peripheral    Laterality:  Right  Pre-procedure details:     Skin preparation:  2% chlorhexidine    Preparation: Patient was prepped and draped in usual sterile fashion    Skin anesthesia (see MAR for exact dosages):     Skin anesthesia method:  None  Procedure details (see MAR for exact dosages):      Block needle gauge:  22 G    Guidance: ultrasound      Anesthetic injected:  Lidocaine 2% w/o epi    Steroid injected:  Methylprednisolone    Additive injected:  None    Injection procedure:  Anatomic landmarks identified, anatomic landmarks palpated, introduced needle, incremental injection and negative aspiration for blood  Post-procedure details:     Dressing:  None    Outcome:  Anesthesia achieved    Patient tolerance of procedure:   Tolerated well, no immediate complications

## 2023-02-20 NOTE — PATIENT INSTRUCTIONS
Do not apply heat to any area that is numb  If you have discomfort or soreness at the injection site, you may apply ice today, 20 minutes on and 20 minutes off  Tomorrow you may use ice or warm, moist heat  Do not apply ice or heat directly to the skin  If you experience severe shortness of breath, go to the Emergency Room  You may have numbness for several hours from the local anesthetic  Please use caution and common sense, especially with weight-bearing activities  You may have an increase or change in the discomfort for 36-48 hours after your treatment  Apply ice and continue with any pain medicine you have been prescribed  Do not do anything strenuous today  You may shower, but no tub baths or hot tubs today  You may resume your normal activities tomorrow, but do not “overdo it”  Resume normal activities slowly when you are feeling better  If you experience redness, drainage or swelling at the injection site, or if you develop a fever above 100 degrees, please call [facility] at [phone] or go to the Emergency Room  Continue to take all routine medicines prescribed by your primary care physician unless otherwise instructed by our staff  Most blood thinners should be started again according to your regularly scheduled dosing  If you have any questions, please give our office a call  As no general anesthesia was used in today's procedure, you should not experience any side effects related to anesthesia  If you have a problem specifically related to your procedure, please call our office at [phone]  Problems not related to your procedure should be directed to your primary care physician

## 2023-02-21 ENCOUNTER — APPOINTMENT (INPATIENT)
Dept: RADIOLOGY | Facility: HOSPITAL | Age: 78
End: 2023-02-21

## 2023-02-21 PROBLEM — J93.9 PNEUMOTHORAX ON RIGHT: Status: ACTIVE | Noted: 2023-02-21

## 2023-02-21 PROBLEM — J96.01 ACUTE RESPIRATORY FAILURE WITH HYPOXIA (HCC): Status: ACTIVE | Noted: 2022-10-15

## 2023-02-21 PROBLEM — Z99.89 OSA ON CPAP: Status: ACTIVE | Noted: 2021-01-11

## 2023-02-21 PROBLEM — D72.829 LEUKOCYTOSIS: Status: ACTIVE | Noted: 2023-02-21

## 2023-02-21 PROBLEM — G47.33 OSA ON CPAP: Status: ACTIVE | Noted: 2021-01-11

## 2023-02-21 LAB
ANION GAP SERPL CALCULATED.3IONS-SCNC: 2 MMOL/L (ref 4–13)
ATRIAL RATE: 87 BPM
ATRIAL RATE: 91 BPM
BASOPHILS # BLD AUTO: 0.03 THOUSANDS/ÂΜL (ref 0–0.1)
BASOPHILS NFR BLD AUTO: 0 % (ref 0–1)
BUN SERPL-MCNC: 28 MG/DL (ref 5–25)
CALCIUM SERPL-MCNC: 8.4 MG/DL (ref 8.4–10.2)
CHLORIDE SERPL-SCNC: 101 MMOL/L (ref 96–108)
CO2 SERPL-SCNC: 36 MMOL/L (ref 21–32)
CREAT SERPL-MCNC: 1.12 MG/DL (ref 0.6–1.3)
EOSINOPHIL # BLD AUTO: 0 THOUSAND/ÂΜL (ref 0–0.61)
EOSINOPHIL NFR BLD AUTO: 0 % (ref 0–6)
ERYTHROCYTE [DISTWIDTH] IN BLOOD BY AUTOMATED COUNT: 13.8 % (ref 11.6–15.1)
GFR SERPL CREATININE-BSD FRML MDRD: 62 ML/MIN/1.73SQ M
GLUCOSE SERPL-MCNC: 128 MG/DL (ref 65–140)
GLUCOSE SERPL-MCNC: 142 MG/DL (ref 65–140)
GLUCOSE SERPL-MCNC: 144 MG/DL (ref 65–140)
GLUCOSE SERPL-MCNC: 149 MG/DL (ref 65–140)
GLUCOSE SERPL-MCNC: 161 MG/DL (ref 65–140)
GLUCOSE SERPL-MCNC: 203 MG/DL (ref 65–140)
HCT VFR BLD AUTO: 45.6 % (ref 36.5–49.3)
HGB BLD-MCNC: 14.4 G/DL (ref 12–17)
IMM GRANULOCYTES # BLD AUTO: 0.11 THOUSAND/UL (ref 0–0.2)
IMM GRANULOCYTES NFR BLD AUTO: 0 % (ref 0–2)
LYMPHOCYTES # BLD AUTO: 0.64 THOUSANDS/ÂΜL (ref 0.6–4.47)
LYMPHOCYTES NFR BLD AUTO: 3 % (ref 14–44)
MAGNESIUM SERPL-MCNC: 2.2 MG/DL (ref 1.9–2.7)
MCH RBC QN AUTO: 32.6 PG (ref 26.8–34.3)
MCHC RBC AUTO-ENTMCNC: 31.6 G/DL (ref 31.4–37.4)
MCV RBC AUTO: 103 FL (ref 82–98)
MONOCYTES # BLD AUTO: 2.17 THOUSAND/ÂΜL (ref 0.17–1.22)
MONOCYTES NFR BLD AUTO: 9 % (ref 4–12)
NEUTROPHILS # BLD AUTO: 21.94 THOUSANDS/ÂΜL (ref 1.85–7.62)
NEUTS SEG NFR BLD AUTO: 88 % (ref 43–75)
NRBC BLD AUTO-RTO: 0 /100 WBCS
P AXIS: 78 DEGREES
P AXIS: 87 DEGREES
PHOSPHATE SERPL-MCNC: 2.9 MG/DL (ref 2.3–4.1)
PLATELET # BLD AUTO: 172 THOUSANDS/UL (ref 149–390)
PMV BLD AUTO: 10.8 FL (ref 8.9–12.7)
POTASSIUM SERPL-SCNC: 4 MMOL/L (ref 3.5–5.3)
PR INTERVAL: 152 MS
PR INTERVAL: 156 MS
QRS AXIS: 69 DEGREES
QRS AXIS: 74 DEGREES
QRSD INTERVAL: 92 MS
QRSD INTERVAL: 94 MS
QT INTERVAL: 368 MS
QT INTERVAL: 372 MS
QTC INTERVAL: 442 MS
QTC INTERVAL: 457 MS
RBC # BLD AUTO: 4.42 MILLION/UL (ref 3.88–5.62)
SODIUM SERPL-SCNC: 139 MMOL/L (ref 135–147)
T WAVE AXIS: 53 DEGREES
T WAVE AXIS: 84 DEGREES
VENTRICULAR RATE: 87 BPM
VENTRICULAR RATE: 91 BPM
WBC # BLD AUTO: 24.89 THOUSAND/UL (ref 4.31–10.16)

## 2023-02-21 RX ORDER — FORMOTEROL FUMARATE 20 UG/2ML
20 SOLUTION RESPIRATORY (INHALATION)
Status: DISCONTINUED | OUTPATIENT
Start: 2023-02-21 | End: 2023-02-23 | Stop reason: HOSPADM

## 2023-02-21 RX ORDER — INSULIN LISPRO 100 [IU]/ML
1-6 INJECTION, SOLUTION INTRAVENOUS; SUBCUTANEOUS
Status: DISCONTINUED | OUTPATIENT
Start: 2023-02-21 | End: 2023-02-23 | Stop reason: HOSPADM

## 2023-02-21 RX ORDER — ASPIRIN 81 MG/1
81 TABLET ORAL
Status: DISCONTINUED | OUTPATIENT
Start: 2023-02-21 | End: 2023-02-23 | Stop reason: HOSPADM

## 2023-02-21 RX ORDER — PREDNISONE 10 MG/1
10 TABLET ORAL DAILY
Status: DISCONTINUED | OUTPATIENT
Start: 2023-02-25 | End: 2023-02-23 | Stop reason: HOSPADM

## 2023-02-21 RX ORDER — PREDNISONE 20 MG/1
20 TABLET ORAL DAILY
Status: DISCONTINUED | OUTPATIENT
Start: 2023-02-22 | End: 2023-02-23 | Stop reason: HOSPADM

## 2023-02-21 RX ORDER — LIDOCAINE HYDROCHLORIDE 10 MG/ML
10 INJECTION, SOLUTION EPIDURAL; INFILTRATION; INTRACAUDAL; PERINEURAL ONCE
Status: COMPLETED | OUTPATIENT
Start: 2023-02-21 | End: 2023-02-21

## 2023-02-21 RX ORDER — LEVALBUTEROL INHALATION SOLUTION 1.25 MG/3ML
1.25 SOLUTION RESPIRATORY (INHALATION)
Status: DISCONTINUED | OUTPATIENT
Start: 2023-02-21 | End: 2023-02-23 | Stop reason: HOSPADM

## 2023-02-21 RX ORDER — GUAIFENESIN/DEXTROMETHORPHAN 100-10MG/5
10 SYRUP ORAL EVERY 6 HOURS PRN
Status: DISCONTINUED | OUTPATIENT
Start: 2023-02-21 | End: 2023-02-23 | Stop reason: HOSPADM

## 2023-02-21 RX ADMIN — FAMOTIDINE 20 MG: 20 TABLET, FILM COATED ORAL at 21:41

## 2023-02-21 RX ADMIN — METOPROLOL SUCCINATE 12.5 MG: 25 TABLET, EXTENDED RELEASE ORAL at 21:41

## 2023-02-21 RX ADMIN — GUAIFENESIN 600 MG: 600 TABLET ORAL at 00:46

## 2023-02-21 RX ADMIN — METOPROLOL SUCCINATE 12.5 MG: 25 TABLET, EXTENDED RELEASE ORAL at 02:43

## 2023-02-21 RX ADMIN — BUDESONIDE 0.5 MG: 0.5 INHALANT RESPIRATORY (INHALATION) at 19:20

## 2023-02-21 RX ADMIN — GABAPENTIN 600 MG: 300 CAPSULE ORAL at 08:14

## 2023-02-21 RX ADMIN — ACETAMINOPHEN 975 MG: 325 TABLET ORAL at 21:39

## 2023-02-21 RX ADMIN — ACETAMINOPHEN 975 MG: 325 TABLET ORAL at 13:50

## 2023-02-21 RX ADMIN — PREDNISONE 30 MG: 10 TABLET ORAL at 08:15

## 2023-02-21 RX ADMIN — LEVALBUTEROL HYDROCHLORIDE 1.25 MG: 1.25 SOLUTION RESPIRATORY (INHALATION) at 07:09

## 2023-02-21 RX ADMIN — GUAIFENESIN AND DEXTROMETHORPHAN 10 ML: 100; 10 SYRUP ORAL at 21:41

## 2023-02-21 RX ADMIN — ATORVASTATIN CALCIUM 80 MG: 40 TABLET, FILM COATED ORAL at 17:12

## 2023-02-21 RX ADMIN — ACETAMINOPHEN 975 MG: 325 TABLET ORAL at 00:46

## 2023-02-21 RX ADMIN — ASPIRIN 81 MG: 81 TABLET, COATED ORAL at 21:40

## 2023-02-21 RX ADMIN — HEPARIN SODIUM 5000 UNITS: 5000 INJECTION INTRAVENOUS; SUBCUTANEOUS at 00:46

## 2023-02-21 RX ADMIN — LEVALBUTEROL HYDROCHLORIDE 1.25 MG: 1.25 SOLUTION RESPIRATORY (INHALATION) at 19:21

## 2023-02-21 RX ADMIN — FENTANYL CITRATE 50 MCG: 50 INJECTION INTRAMUSCULAR; INTRAVENOUS at 00:19

## 2023-02-21 RX ADMIN — FAMOTIDINE 20 MG: 20 TABLET, FILM COATED ORAL at 02:43

## 2023-02-21 RX ADMIN — LIDOCAINE HYDROCHLORIDE 10 ML: 10 INJECTION, SOLUTION EPIDURAL; INFILTRATION; INTRACAUDAL; PERINEURAL at 00:19

## 2023-02-21 RX ADMIN — HEPARIN SODIUM 5000 UNITS: 5000 INJECTION INTRAVENOUS; SUBCUTANEOUS at 13:50

## 2023-02-21 RX ADMIN — UMECLIDINIUM 1 PUFF: 62.5 AEROSOL, POWDER ORAL at 08:14

## 2023-02-21 RX ADMIN — HEPARIN SODIUM 5000 UNITS: 5000 INJECTION INTRAVENOUS; SUBCUTANEOUS at 06:24

## 2023-02-21 RX ADMIN — HYDROMORPHONE HYDROCHLORIDE 0.2 MG: 0.2 INJECTION, SOLUTION INTRAMUSCULAR; INTRAVENOUS; SUBCUTANEOUS at 04:39

## 2023-02-21 RX ADMIN — LEVALBUTEROL HYDROCHLORIDE 1.25 MG: 1.25 SOLUTION RESPIRATORY (INHALATION) at 13:04

## 2023-02-21 RX ADMIN — FORMOTEROL FUMARATE 20 MCG: 20 SOLUTION RESPIRATORY (INHALATION) at 19:21

## 2023-02-21 RX ADMIN — BUDESONIDE 0.5 MG: 0.5 INHALANT RESPIRATORY (INHALATION) at 07:09

## 2023-02-21 RX ADMIN — OXYCODONE HYDROCHLORIDE 5 MG: 5 TABLET ORAL at 02:12

## 2023-02-21 RX ADMIN — HEPARIN SODIUM 5000 UNITS: 5000 INJECTION INTRAVENOUS; SUBCUTANEOUS at 21:46

## 2023-02-21 RX ADMIN — BENZONATATE 200 MG: 100 CAPSULE ORAL at 00:46

## 2023-02-21 RX ADMIN — METOPROLOL SUCCINATE 12.5 MG: 25 TABLET, EXTENDED RELEASE ORAL at 08:15

## 2023-02-21 RX ADMIN — ASPIRIN 81 MG 81 MG: 81 TABLET ORAL at 00:46

## 2023-02-21 RX ADMIN — ACETAMINOPHEN 975 MG: 325 TABLET ORAL at 06:24

## 2023-02-21 RX ADMIN — AZITHROMYCIN MONOHYDRATE 500 MG: 250 TABLET ORAL at 00:46

## 2023-02-21 RX ADMIN — GUAIFENESIN AND DEXTROMETHORPHAN 10 ML: 100; 10 SYRUP ORAL at 03:13

## 2023-02-21 RX ADMIN — FLUTICASONE PROPIONATE 1 SPRAY: 50 SPRAY, METERED NASAL at 08:14

## 2023-02-21 RX ADMIN — GABAPENTIN 600 MG: 300 CAPSULE ORAL at 17:12

## 2023-02-21 RX ADMIN — FUROSEMIDE 40 MG: 40 TABLET ORAL at 08:15

## 2023-02-21 RX ADMIN — INSULIN LISPRO 2 UNITS: 100 INJECTION, SOLUTION INTRAVENOUS; SUBCUTANEOUS at 12:13

## 2023-02-21 NOTE — QUICK NOTE
Patient deemed acceptable for med telemetry level of care  SLIM will assume care on 2/22/2023  Please see progress note from the same date of service by our critical care colleagues for ongoing plan of care

## 2023-02-21 NOTE — PLAN OF CARE
Problem: RESPIRATORY - ADULT  Goal: Achieves optimal ventilation and oxygenation  Description: INTERVENTIONS:  - Assess for changes in respiratory status  - Assess for changes in mentation and behavior  - Position to facilitate oxygenation and minimize respiratory effort  - Oxygen administered by appropriate delivery if ordered  - Initiate smoking cessation education as indicated  - Encourage broncho-pulmonary hygiene including cough, deep breathe, Incentive Spirometry  - Assess the need for suctioning and aspirate as needed  - Assess and instruct to report SOB or any respiratory difficulty  - Respiratory Therapy support as indicated  OutcoChest tube @ 20cm  NC O2 @ 5L/min

## 2023-02-21 NOTE — UTILIZATION REVIEW
Initial Clinical Review    Admission: Date/Time/Statement:   Admission Orders (From admission, onward)     Ordered        02/20/23 2301  INPATIENT ADMISSION  Once                      Orders Placed This Encounter   Procedures   • INPATIENT ADMISSION     Standing Status:   Standing     Number of Occurrences:   1     Order Specific Question:   Level of Care     Answer:   Level 2 Stepdown / HOT [14]     Order Specific Question:   Estimated length of stay     Answer:   More than 2 Midnights     Order Specific Question:   Certification     Answer:   I certify that inpatient services are medically necessary for this patient for a duration of greater than two midnights  See H&P and MD Progress Notes for additional information about the patient's course of treatment  ED Arrival Information     Expected   -    Arrival   2/20/2023 20:37    Acuity   Emergent            Means of arrival   Walk-In    Escorted by   Spouse    Service   Critical Care/ICU    Admission type   Emergency            Arrival complaint   back and chest pain post procedure           Chief Complaint   Patient presents with   • Chest Pain     Patient reports having  a nerve block procedure done today, now reports having 6/10 pain in his rib/ back region of his right side  Patient complains of having a hard time taking a deep breath  Initial Presentation: 65 y o  male to ED via walk in from   Present with an abrupt onset of R sided chest pain and shortness of breath  He has PMH CKD statge 3, CAD, ischemic heart disease, COPD, chronic respiratory failure on 4L O2 at baseline  Yesterday afternoon the patient underwent R peripheral intercostal nerve block  After he was discharged he began to experience severe R sided chest pain with associated shortness of breath  On arrival to the ED, he was noted to have R pneumothorax  CT was placed with resolution of ptx     Admitted to ICU with DX: Pneumothorax on right  on exam: respiratory distress; lungs diminished on Rt; CT to wsl; Tachypnea RR 24-31; O2 Sat 83% ra; WBC 28 19  Given in ED Fentanyl iv x 3; ativan iv x 1  PLAN: CT to ws; repeat cxr in am; pain control; CT surgery consult; accu checks w/ ssic; Cardiopulmonary monitoring; monitor labs; cont UDTX; cont supplemental O2    Date: 2/21/23    Day  Clear to auscultation bilateral without wheeze, rales or rhonci  R chest tube in place  No air leak   CT to water seal; Tachypnea RR 24; O2 @ 5L via nc sat 91%; WBC 24 89  CXR 2/21- Chest tube in place, No appreciable pneumothorax  Plan: prednisone taper started; pain control; accu checks w/ ssic; Cardiopulmonary monitoring; monitor labs; cont UDTX; cont supplemental O2; continuous pulse ox; repeat cxr in am        ED Triage Vitals   Temperature Pulse Respirations Blood Pressure SpO2   02/20/23 2105 02/20/23 2105 02/20/23 2105 02/20/23 2105 02/20/23 2105   98 1 °F (36 7 °C) 92 18 109/55 (!) 83 %      Temp Source Heart Rate Source Patient Position - Orthostatic VS BP Location FiO2 (%)   02/20/23 2105 02/20/23 2105 02/20/23 2105 02/21/23 0200 --   Oral Monitor Sitting Left arm       Pain Score       02/20/23 2145       7          Wt Readings from Last 1 Encounters:   02/20/23 93 4 kg (206 lb)     Additional Vital Signs:   Date/Time Temp Pulse Resp BP MAP (mmHg) SpO2 Calculated FIO2 (%) - Nasal Cannula O2 Flow Rate (L/min) Nasal Cannula O2 Flow Rate (L/min) O2 Device O2 Interface Device Patient Position - Orthostatic VS   02/21/23 1500 97 5 °F (36 4 °C) 77 24 Abnormal  -- -- 90 % 40 -- 5 L/min Nasal cannula -- --   02/21/23 1304 -- -- -- -- -- 91 % 40 -- 5 L/min Nasal cannula -- --   02/21/23 1200 -- 70 20 130/58 84 91 % 40 -- 5 L/min Nasal cannula -- Lying   02/21/23 1053 -- -- -- -- -- 92 % 40 -- 5 L/min Nasal cannula -- --   02/21/23 0900 -- 76 22 115/56 77 90 % 36 -- 4 L/min Nasal cannula -- Sitting         Date/Time Temp Pulse Resp BP MAP (mmHg) SpO2 Calculated FIO2 (%) - Nasal Cannula O2 Flow Rate (L/min) Nasal Cannula O2 Flow Rate (L/min) O2 Device O2 Interface Device Patient Position - Orthostatic VS   02/21/23 0815 -- 75 -- -- -- -- -- -- -- -- -- --   02/21/23 0800 -- 62 22 128/59 85 94 % 36 -- 4 L/min Nasal cannula -- Lying   02/21/23 0709 -- -- -- -- -- 92 % 36 -- 4 L/min Nasal cannula -- --   02/21/23 0700 97 °F (36 1 °C) Abnormal  64 19 125/60 87 92 % -- -- -- -- -- Lying   02/21/23 0600 -- 66 32 Abnormal  122/58 84 91 % 40 -- 5 L/min Nasal cannula -- Lying   02/21/23 0500 -- 70 23 Abnormal  138/65 93 94 % 40 -- 5 L/min Nasal cannula -- Lying   02/21/23 0400 97 9 °F (36 6 °C) 75 21 134/66 94 92 % 40 -- 5 L/min Nasal cannula -- Lying   02/21/23 0300 -- 75 22 137/61 88 92 % 40 -- 5 L/min Nasal cannula -- Lying   02/21/23 0245 -- 77 20 141/66 95 91 % 40 -- 5 L/min Nasal cannula -- Lying   02/21/23 0215 -- 79 25 Abnormal  114/55 80 91 % 40 -- 5 L/min Nasal cannula -- Lying   02/21/23 0200 -- 80 19 122/60 87 93 % 40 -- 5 L/min Nasal cannula -- Lying   02/21/23 0130 -- 80 21 116/56 80 95 % -- -- -- -- -- --   02/21/23 0100 -- 84 31 Abnormal  120/57 82 95 % -- -- -- -- -- --   02/21/23 0031 -- 82 24 Abnormal  -- -- 99 % 40 -- 5 L/min Nasal cannula -- --   02/21/23 0030 -- 83 26 Abnormal  128/59 85 100 % -- -- -- -- -- --   02/21/23 0000 -- 85 -- 138/67 96 100 % -- -- -- -- -- --   02/20/23 2358 -- -- -- -- -- -- -- -- -- -- HFNC prongs --   02/20/23 2331 -- 89 20 127/56 80 94 % -- -- -- -- -- --   02/20/23 2301 -- 88 24 Abnormal  109/54 78 93 % -- -- -- -- -- --   02/20/23 2230 -- 87 16 147/66 95 94 % -- -- -- -- -- --   02/20/23 2200 -- 91 15 143/67 97 95 % -- -- -- -- -- --   02/20/23 2130 -- 88 22 140/67 96 94 % -- -- -- -- -- --   02/20/23 2123 -- -- -- -- -- -- -- 6 L/min -- Nasal cannula -- --   02/20/23 2105 98 1 °F (36 7 °C) 92 18 109/55 -- 83 % Abnormal  32 -- 3 L/min Nasal cannula -- Sitting           EKG: NSR rate 80    Pertinent Labs/Diagnostic Test Results:   XR chest portable   Final Result by Ej Mayer, MD (02/21 1141)      Right chest tube tip overlies the mid to lower lung zone, stable  No definite pneumothorax  Patchy bibasilar atelectasis  Workstation performed: [id number]         XR chest portable ICU   Final Result by Caryle Fine, MD (02/21 1139)      Stable positioning of the right chest tube at the mid to lower lung zone  No definite pneumothorax  Workstation performed: [id number]         XR chest portable   ED Interpretation by aJz Pichardo III, DO (02/21 0032)   No PTX noted, the kinking of the chest tube does note appear to be a pronounced  Final Result by Caryle Fine, MD (02/21 1136)      Right chest tube again noted  Trace right apical and lateral pneumothorax, not definitely seen on most recent prior exam but still improved from the initial chest x-rays  Patchy bibasilar atelectasis, stable  Workstation performed: [id number]         XR chest 1 view portable   ED Interpretation by Jaz Pichardo III, DO (02/20 2244)   PTX resolved, Chest tube on the R sided appears to be in fissure of R lobe  Final Result by Eduardo Coulter MD (02/21 1109)      Interval placement of right chest tube with resolution of right pneumothorax  Workstation performed: [id number]         XR chest 1 view portable   ED Interpretation by Jaz Pichardo III, DO (02/20 2126)   Small 15 % PTX on R side of the chest      Final Result by Anival Reyna MD (02/21 1101)      Small moderate right pneumothorax  No evidence of tension              Workstation performed: [id number]         XR chest portable    (Results Pending)         Results from last 7 days   Lab Units 02/21/23  0442 02/20/23 2123   WBC Thousand/uL 24 89* 28 19*   HEMOGLOBIN g/dL 14 4 15 9   HEMATOCRIT % 45 6 50 7*   PLATELETS Thousands/uL 172 210   NEUTROS ABS Thousands/µL 21 94*  --    BANDS PCT %  --  3         Results from last 7 days   Lab Units 02/21/23  0442 02/20/23 2123   SODIUM mmol/L 139 140   POTASSIUM mmol/L 4 0 3 6   CHLORIDE mmol/L 101 96   CO2 mmol/L 36* 39*   ANION GAP mmol/L 2* 5   BUN mg/dL 28* 26*   CREATININE mg/dL 1 12 1 29   EGFR ml/min/1 73sq m 62 52   CALCIUM mg/dL 8 4 9 3   MAGNESIUM mg/dL 2 2  --    PHOSPHORUS mg/dL 2 9  --          Results from last 7 days   Lab Units 02/21/23  1058 02/21/23  0726 02/21/23  0312   POC GLUCOSE mg/dl 203* 144* 142*     Results from last 7 days   Lab Units 02/21/23  0442 02/20/23 2123   GLUCOSE RANDOM mg/dL 161* 196*       ED Treatment:   Medication Administration from 02/20/2023 2037 to 02/21/2023 0151       Date/Time Order Dose Route Action     02/20/2023 2145 EST lidocaine (PF) (XYLOCAINE-MPF) 1 % injection 10 mL 10 mL Infiltration Given by Other     02/20/2023 2145 EST lidocaine (PF) (XYLOCAINE-MPF) 1 % injection 10 mL 10 mL Infiltration Given by Other     02/20/2023 2145 EST fentanyl citrate (PF) 100 MCG/2ML 25 mcg 25 mcg Intravenous Given     02/20/2023 2144 EST LORazepam (ATIVAN) injection 0 5 mg 0 5 mg Intravenous Given     02/20/2023 2223 EST fentanyl citrate (PF) 100 MCG/2ML 25 mcg 25 mcg Intravenous Given     02/20/2023 2218 EST fentanyl citrate (PF) 100 MCG/2ML 50 mcg 50 mcg Intravenous Given     02/21/2023 0046 EST aspirin chewable tablet 81 mg 81 mg Oral Given     02/21/2023 0046 EST azithromycin (ZITHROMAX) tablet 500 mg 500 mg Oral Given     02/21/2023 0046 EST benzonatate (TESSALON PERLES) capsule 200 mg 200 mg Oral Given     02/21/2023 0046 EST heparin (porcine) subcutaneous injection 5,000 Units 5,000 Units Subcutaneous Given     02/21/2023 0046 EST acetaminophen (TYLENOL) tablet 975 mg 975 mg Oral Given     02/21/2023 0046 EST guaiFENesin (MUCINEX) 12 hr tablet 600 mg 600 mg Oral Given     02/21/2023 0019 EST fentanyl citrate (PF) 100 MCG/2ML 50 mcg 50 mcg Intravenous Given     02/21/2023 0019 EST lidocaine (PF) (XYLOCAINE-MPF) 1 % injection 10 mL 10 mL Infiltration Given          Present on Admission:  • Acute on chronic respiratory failure with hypoxia (HCC)  • Chronic ischemic heart disease  • COPD (chronic obstructive pulmonary disease) (HCC)  • Stage 3a chronic kidney disease (HCC)  • Gastroesophageal reflux disease  • Prediabetes      Admitting Diagnosis: Chest pain [R07 9]  Pneumothorax on right [J93 9]     Age/Sex: 65 y o  male     Admission Orders: SCD's; Cardiopulmonary monitoring; cardiac diet; accu checks w/ ssic; I/O    Scheduled Medications:  acetaminophen, 975 mg, Oral, Q8H SVETLANA  aspirin, 81 mg, Oral, HS  atorvastatin, 80 mg, Oral, Daily With Dinner  azithromycin, 500 mg, Oral, Once per day on Mon Wed Fri  budesonide, 0 5 mg, Nebulization, Q12H  famotidine, 20 mg, Oral, HS  fluticasone, 1 spray, Each Nare, Daily  furosemide, 40 mg, Oral, Daily  gabapentin, 600 mg, Oral, BID  heparin (porcine), 5,000 Units, Subcutaneous, Q8H SVETLANA  insulin lispro, 1-6 Units, Subcutaneous, TID AC  insulin lispro, 1-6 Units, Subcutaneous, HS  levalbuterol, 1 25 mg, Nebulization, TID  metoprolol succinate, 12 5 mg, Oral, BID  [START ON 2/22/2023] predniSONE, 20 mg, Oral, Daily   Followed by  Myra Bell ON 2/25/2023] predniSONE, 10 mg, Oral, Daily  umeclidinium, 1 puff, Inhalation, Daily    fentanyl citrate (PF) 100 MCG/2ML 50 mcg  Dose: 50 mcg  Freq:  Once Route: IV  Start: 02/20/23 2345 End: 02/21/23 0019    levalbuterol (XOPENEX) inhalation solution 1 25 mg  Dose: 1 25 mg  Freq: 3 times daily (RESP) Route: NEBULIZATION  Start: 02/21/23 0800    umeclidinium 62 5 mcg/actuation inhaler AEPB 1 puff  Dose: 1 puff  Freq: Daily Route: IN  Start: 02/21/23 0900 End: 02/21/23 1331      Continuous IV Infusions: None     PRN Meds:  benzonatate, 200 mg, Oral, TID PRN  dextromethorphan-guaiFENesin, 10 mL, Oral, Q6H PRN  HYDROmorphone, 0 2 mg, Intravenous, Q3H PRN (2/21 rec'd x 1 so far today)   oxyCODONE, 5 mg, Oral, Q6H PRN  (2/21 rec'd x 1 so far today)    Or  oxyCODONE, 10 mg, Oral, Q6H PRN        IP CONSULT TO ACUTE CARE SURGERY    Network Utilization Review Department  ATTENTION: Please call with any questions or concerns to [phone] and carefully listen to the prompts so that you are directed to the right person  All voicemails are confidential   [facility] all requests for admission clinical reviews, approved or denied determinations and any other requests to dedicated fax number below belonging to the campus where the patient is receiving treatment   List of dedicated fax numbers for the Facilities:  [address] DENIALS (Administrative/Medical Necessity) [phone]   [address] (Maternity/NICU/Pediatrics) [phone]   [address] [phone]   [facility] [address] [phone]   [address] [address] Myrna Chadwick 28 [phone]   1555 [facility] [place] [address] [facility] [place] 134 815 [facility] [phone]

## 2023-02-21 NOTE — ASSESSMENT & PLAN NOTE
Lab Results   Component Value Date    EGFR 52 02/20/2023    EGFR 63 01/27/2023    EGFR 57 01/19/2023    CREATININE 1 29 02/20/2023    CREATININE 1 11 01/27/2023    CREATININE 1 20 01/19/2023   · Baseline Cr around 1 2  · Continue to monitor I/O and renal indices

## 2023-02-21 NOTE — NURSING NOTE
Chief Complaint   Patient presents with   • Atrial Fibrillation     follow up       Subjective:   Sebastián Castro is a 54 y.o. male who presents today for follow-up of atrial fibrillation.  He is been seen by our nurse practitioners on several occasions recently with intermittently controlled heart rates.  Has a history of normal coronary angiography recently and several admissions for chest pain associated with A. fib RVR.  Originally his LV function was normal but due to ongoing poor rate control despite Lopressor low-dose and amiodarone he continues to have decrement in his EF which is mild.  Today he is in atrial for ablation with rapid ventricular response and indicates he again has not been taking his medications which appears to be a chronic issue for him.  He is also unable to repeat them to meet with reliability when described.  He has no heart failure symptoms at this time.      Past Medical History:   Diagnosis Date   • A-fib (HCC)    • Chest pain    • Diabetes ([facility])    • Diabetic neuropathy ([facility])    • Hypercholesterolemia    • Hypertension    • Morbid obesity ([facility])    • Noncompliance    • Normal cardiac stress test      Past Surgical History:   Procedure Laterality Date   • CARDIAC CATH  07/21/2018    EF 45%, normal coronaries.   • IRRIGATION & DEBRIDEMENT ORTHO Right 2/19/2018    Procedure: IRRIGATION & DEBRIDEMENT ORTHO-FOOT;  Surgeon: Kirby Lopez M.D.;  Location: SURGERY [facility];  Service: Orthopedics   • TOE AMPUTATION Right 1/17/2018    Procedure: TOE AMPUTATION-1ST RAY;  Surgeon: Doug Delong M.D.;  Location: SURGERY [facility];  Service: Orthopedics   • TOE AMPUTATION Right 11/10/2017    Procedure: TOE AMPUTATION;  Surgeon: Osorio eLo M.D.;  Location: SURGERY [facility];  Service: Orthopedics     No family history on file.  Social History     Social History   • Marital status:      Spouse name: N/A   • Number of children: N/A   • Years of education: N/A  Received from ER  Report from ED RN(Penelope)  Self transferred to bed  Connected to bedside monitor  Awake and alert, oriented  R chest tube intact to 20 cm suction      Occupational History   • Not on file.     Social History Main Topics   • Smoking status: Never Smoker   • Smokeless tobacco: Never Used   • Alcohol use No   • Drug use: No   • Sexual activity: Not on file     Other Topics Concern   • Not on file     Social History Narrative   • No narrative on file     Allergies   Allergen Reactions   • Daptomycin Rash     Body rash  RXN=1/2018     • Pcn [Penicillins] Hives and Rash      Rash & hives  RXN=since a kid   • Unasyn [Ampicillin-Sulbactam Sodium] Hives, Itching and Swelling   • Vancomycin Rash     Red man syndrome     Outpatient Encounter Prescriptions as of 10/9/2018   Medication Sig Dispense Refill   • metoprolol (LOPRESSOR) 25 MG Tab Take 1 Tab by mouth 2 times a day. 180 Tab 11   • amiodarone (CORDARONE) 200 MG Tab Take 1 Tab by mouth 2 Times a Day. 60 Tab 11   • rivaroxaban (XARELTO) 20 MG Tab tablet Take 1 Tab by mouth with dinner. 90 Tab 3   • pravastatin (PRAVACHOL) 20 MG Tab Take 1 Tab by mouth every day. 90 Tab 3   • [DISCONTINUED] Rivaroxaban (XARELTO PO) Take  by mouth.       No facility-administered encounter medications on file as of 10/9/2018.      Review of Systems   All other systems reviewed and are negative.       Objective:   /80 (BP Location: Left arm, Patient Position: Sitting)   Pulse (!) 120   Ht 1.829 m (6')   Wt 122.5 kg (270 lb)   SpO2 93%   BMI 36.62 kg/m²     Physical Exam   Constitutional: He is oriented to person, place, and time. He appears well-developed and well-nourished. No distress.   Morbidly obese   HENT:   Head: Normocephalic and atraumatic.   Right Ear: External ear normal.   Left Ear: External ear normal.   Eyes: Pupils are equal, round, and reactive to light. Conjunctivae and EOM are normal. Right eye exhibits no discharge. Left eye exhibits no discharge. No scleral icterus.   Neck: Normal range of motion. Neck supple. No JVD present. No tracheal deviation present. No thyromegaly present.   Cardiovascular: Intact  distal pulses.  An irregularly irregular rhythm present. Tachycardia present.  PMI is not displaced.  Exam reveals no gallop and no friction rub.    No murmur heard.  Pulses:       Carotid pulses are 2+ on the right side, and 2+ on the left side.       Radial pulses are 2+ on the left side.        Popliteal pulses are 2+ on the right side, and 2+ on the left side.        Dorsalis pedis pulses are 2+ on the right side, and 2+ on the left side.        Posterior tibial pulses are 2+ on the right side, and 2+ on the left side.   Pulmonary/Chest: Effort normal and breath sounds normal. No respiratory distress. He has no wheezes. He has no rales. He exhibits no tenderness.   Abdominal: Soft. Bowel sounds are normal. He exhibits no distension. There is no tenderness.   Musculoskeletal: Normal range of motion. He exhibits no edema, tenderness or deformity.   Neurological: He is alert and oriented to person, place, and time. No cranial nerve deficit (cranial nerves II through XII grossly intact). Coordination normal.   Skin: Skin is warm and dry. No rash noted. He is not diaphoretic. No erythema. No pallor.   Psychiatric: He has a normal mood and affect. His behavior is normal. Thought content normal.   Vitals reviewed.    EKG (2018):  I have personally reviewed the EKG this visit and discussed with the patient.  Results for orders placed or performed in visit on 18   [facility] EKG (Clinic Performed)   Result Value Ref Range    Report       [facility] [place] [facility]    Test Date:  2018  Pt Name:    EMELY FLORES                Department: [facility]  MRN:        [id number]                      Room:  Gender:     Male                         Technician: KIM  :        1964                   Requested By:JARETT LICEA  Order #:    [id number]                    Luís MD: Presley Camara MD    Measurements  Intervals                                Axis  Rate:       114                          P:  DE:                                       QRS:        43  QRSD:       92                           T:          50  QT:         328  QTc:        452    Interpretive Statements  ATRIAL FIBRILLATION, V-RATE    VENTRICULAR PREMATURE COMPLEX  BORDERLINE LOW VOLTAGE IN FRONTAL LEADS  BORDERLINE R WAVE PROGRESSION, ANTERIOR LEADS  Compared to ECG 07/19/2018 10:58:22  Ventricular premature complex(es) now present    Electronically Signed On 8-2-2018 17:58:02 PDT by Presley Camara MD         ECHO CONCLUSIONS (7/9/2018):  Moderately reduced left ventricular systolic function.  Mild concentric left ventricular hypertrophy.  Aortic sclerosis without stenosis.  Estimated right ventricular systolic pressure  is 30 mmHg.  Compared to the images of the study done on 01/27/2018 there has been   reduction in left ventricular ejection fraction.    CORONARY ANGIOGRAPHY (7/19/2018):  1.  No significant coronary artery disease.  2.  Mildly reduced left ventricular systolic function with global hypokinesis.    Post-PVC ejection fraction in the range of 40-45%.  3.  Atrial fibrillation.        Assessment:     1. Paroxysmal A-fib (HCC)  metoprolol (LOPRESSOR) 25 MG Tab    amiodarone (CORDARONE) 200 MG Tab    rivaroxaban (XARELTO) 20 MG Tab tablet   2. Type 2 diabetes mellitus with hyperosmolarity without coma, without long-term current use of insulin (HCC)     3. Mixed hyperlipidemia  pravastatin (PRAVACHOL) 20 MG Tab   4. Essential hypertension     5. On continuous oral anticoagulation     6. Noncompliance         Medical Decision Making:  Today's Assessment / Status / Plan:     He has a tachycardia mediated cardiomyopathy which is nonischemic demonstrated by normal coronary angiography several months ago.  He has chest pain related to paroxysms of A. fib with RVR.  He is repeatedly medically noncompliant and we discussed this today.  In fact he was not taking any of his medications for several days prior to this visit.  He remains in A.  fib RVR.  We discussed that this is having a document his ejection fraction and shortening his life.  He is tolerating his anticoagulation well although he has been off of his Xarelto for the past few days.    1.  Amiodarone 200 mg twice daily re institute  2.  Lopressor 50 mg twice daily increase to 75 mg and then 100 mg twice daily as tolerated for heart rate less than 100  3.  Decrease lisinopril by half due to needed for increased beta blocker  4.  Continue anticoagulant with Xarelto  5.  If he is unable to achieve good heart rate control or improve rhythm management over the next 1-2 weeks recommend elective cardioversion and referral to EP for consideration of ablative therapy as he has trouble remembering to take his medications.  6.  Medical compliance is recommended strongly    Recommend following up in 1-2 weeks with our nurse practitioners

## 2023-02-21 NOTE — ASSESSMENT & PLAN NOTE
· Likely reactive, no obvious source for infection  · CXR without obvious consolidation  · Abdominal exam benign  · Will send UA for completeness  · Monitor off of abx  · Monitor WBC and fever curve

## 2023-02-21 NOTE — CONSULTS
Consultation - General Surgery   Megan Berry 65 y o  male MRN: [id number]  Unit/Bed#: ICU 02-01 Encounter: [id number]    Assessment:  65year old male with PMH of CAD, ischemic heart disease, COPD, sleep apnea, chronic respiratory failure and prostate cancer presents for the management of R chest tube secondary to pneumothorax  PPD#1 s/p R peripheral intercostal nerve block  · Afebrile, intermittent episodes of tachypnea on 4L nasal cannula  · WBC 24 89 (28 yesterday), remainder of labs unremarkable  · CXR 2/21/23: "Right chest tube tip overlies the mid to lower lung zone, stable  No definite pneumothorax  Patchy bibasilar atelectasis "  · R chest tube placed last night and repositioned  R chest tube currently in place to suction  No air leak  Mild bloody saturation of dressings around chest tube  Patient complains of pain with a deep breath  Plan:  · Monitor chest tube place and presence of an air leak  · Serial CXRs  · Analgesia prn   · Encourage deep breathing and ambulation   · Rest of medical management per primary  Per [facility] AP, pulmonology will also follow and manage the chest tube  History of Present Illness   Chief Complaint: R sided chest pain    HPI:  Megan Berry is a 65 y o  male with past medical history significant for CAD, ischemic heart disease, chronic respiratory failure, sleep apnea, COPD and prostate cancer who initially presented to [facility] with complaints of abrupt R sided chest pain s/p R peripheral intercostal nerve block earlier yesterday  Patient presented after having persistent R sided chest pain and dyspnea after procedure yesterday  Evaluation in the ED showed a R pneumothorax and a subsequent chest tube was placed  It was noted the pneumothorax resolved  Then, the patient decompensated and it was found that the chest tube had become disconnected  The chest tube was repositioned and a repeat CXR showed the chest tube was in the right place and pneumothorax resolved   General surgery was consulted for the management of chest tube  Presently, the patient reports mild R sided chest pain, increasing when he takes a deep breath  He denies fever, chills, coughing, sob, abdominal pain, difficulty urinating, and leg swelling  He reports using a CPAP machine at home  He takes a baby aspirin daily  He reports having four stents placed in his heart  He is on oxygen at home  Review of Systems   Constitutional: Negative for appetite change, chills and fever  HENT: Negative for congestion and sore throat  Respiratory: Negative for cough, chest tightness and shortness of breath  Cardiovascular: Positive for chest pain (R sided)  Negative for leg swelling  Gastrointestinal: Negative for abdominal distention, abdominal pain, diarrhea, nausea and vomiting  Endocrine: Negative for polydipsia and polyuria  Genitourinary: Negative for difficulty urinating, dysuria and urgency  Musculoskeletal: Negative for gait problem and joint swelling  Skin: Negative for pallor and rash  Neurological: Negative for dizziness, numbness and headaches         Historical Information   Past Medical History:   Diagnosis Date   • Bilateral carotid artery stenosis    • Cancer (HCC)     skin   • Chronic diastolic heart failure (HCC)    • Chronic ischemic heart disease    • Chronic kidney disease     stage 3   • Colon polyp    • COPD (chronic obstructive pulmonary disease) ([facility])    • Coronary artery disease     hx stents, MI, PCI   • COVID 11/2021   • CPAP (continuous positive airway pressure) dependence    • Emphysema of lung ([facility] ) 1995    started   • Hearing loss    • History of transfusion 1995   • Hyperlipidemia    • Hypertension    • MI [facility]my[facility] infarction) ([facility] ) 1995   • Myocardial infarction ([facility] ) 1995   • Pneumonia    • Prostate cancer ([place] [address] )    • RSV (respiratory syncytial virus infection) 10/2022   • S/P carotid endarterectomy    • Shortness of breath     O2 2 l/nc PRN   • Sleep apnea    • Sleep apnea, obstructive    • Stented coronary artery      Past Surgical History:   Procedure Laterality Date   • APPENDECTOMY     • CARDIAC CATHETERIZATION  03/18/2021    left main with no significant disease, proximal LAD with 10% stenosis at the site of prior stent, left circumflex artery with minimal luminal irregularities mid RCA with 50% stenosis at site of prior stent and PL segment with distal disease supplied by collaterals from the distal circumflex with no significant CAD requiring revascularization at that time     • CATARACT EXTRACTION, BILATERAL Bilateral    • COLONOSCOPY     • CORONARY ANGIOPLASTY WITH STENT PLACEMENT  1999    RCA   • CORONARY ANGIOPLASTY WITH STENT PLACEMENT  2001    RCA   • CORONARY ANGIOPLASTY WITH STENT PLACEMENT  2003    LAD   • EYE SURGERY     • SC BX PROSTATE STRTCTC SATURATION SAMPLING IMG GID N/A 09/13/2022    Procedure: TRANSPERINEAL MRI FUSION  BIOPSY PROSTATE;  Surgeon: Mary Mercado MD;  Location: [facility];  Service: Urology   • SC NEUROPLASTY &/TRANSPOS MEDIAN NRV Zoie Roman Left 07/22/2022    Procedure: RELEASE CARPAL TUNNEL;  Surgeon: Wilson Alcala MD;  Location: [facility];  Service: Orthopedics   • SC NEUROPLASTY &/TRANSPOSITION ULNAR NERVE ELBOW Left 07/22/2022    Procedure: RELEASE CUBITAL TUNNEL;  Surgeon: Wilson Alcala MD;  Location: [facility];  Service: Orthopedics   • SC NEUROPLASTY &/TRANSPOSITION ULNAR NERVE WRIST Left 07/22/2022    Procedure: Farnaz Quintero RELEASE;  Surgeon: Wilson Alcala MD;  Location: [facility];  Service: Orthopedics   • SKIN CANCER EXCISION  2012    chin-per pt, basal cell  also right ankle     Social History   Social History     Substance and Sexual Activity   Alcohol Use Never     Social History     Substance and Sexual Activity   Drug Use Never     E-Cigarette/Vaping   • E-Cigarette Use Never User      E-Cigarette/Vaping Substances   • Nicotine No    • THC No    • CBD No    • Flavoring No    • Other No    • Unknown No Social History     Tobacco Use   Smoking Status Former   • Packs/day: 2 00   • Years: 35 00   • Pack years: 70 00   • Types: Cigarettes   • Start date: 1960   • Quit date: 1995   • Years since quittin 6   Smokeless Tobacco Never   Tobacco Comments    stopped      Family History: non-contributory    Meds/Allergies   all current active meds have been reviewed and current meds:   Current Facility-Administered Medications   Medication Dose Route Frequency   • acetaminophen (TYLENOL) tablet 975 mg  975 mg Oral Q8H [address]   • aspirin (ECOTRIN LOW STRENGTH) EC tablet 81 mg  81 mg Oral HS   • atorvastatin (LIPITOR) tablet 80 mg  80 mg Oral Daily With Dinner   • azithromycin (ZITHROMAX) tablet 500 mg  500 mg Oral Once per day on    • benzonatate (TESSALON PERLES) capsule 200 mg  200 mg Oral TID PRN   • budesonide (PULMICORT) inhalation solution 0 5 mg  0 5 mg Nebulization Q12H   • dextromethorphan-guaiFENesin (ROBITUSSIN DM) oral syrup 10 mL  10 mL Oral Q6H PRN   • famotidine (PEPCID) tablet 20 mg  20 mg Oral HS   • fluticasone (FLONASE) 50 mcg/act nasal spray 1 spray  1 spray Each Nare Daily   • furosemide (LASIX) tablet 40 mg  40 mg Oral Daily   • gabapentin (NEURONTIN) capsule 600 mg  600 mg Oral BID   • heparin (porcine) subcutaneous injection 5,000 Units  5,000 Units Subcutaneous Q8H [address]   • HYDROmorphone HCl (DILAUDID) injection 0 2 mg  0 2 mg Intravenous Q3H PRN   • insulin lispro (HumaLOG) 100 units/mL subcutaneous injection 1-6 Units  1-6 Units Subcutaneous TID AC   • insulin lispro (HumaLOG) 100 units/mL subcutaneous injection 1-6 Units  1-6 Units Subcutaneous HS   • levalbuterol (XOPENEX) inhalation solution 1 25 mg  1 25 mg Nebulization TID   • metoprolol succinate (TOPROL-XL) 24 hr tablet 12 5 mg  12 5 mg Oral BID   • oxyCODONE (ROXICODONE) IR tablet 5 mg  5 mg Oral Q6H PRN    Or   • oxyCODONE (ROXICODONE) immediate release tablet 10 mg  10 mg Oral Q6H PRN   • [START ON 2023] predniSONE tablet 20 mg  20 mg Oral Daily    Followed by   • [START ON 2/25/2023] predniSONE tablet 10 mg  10 mg Oral Daily   • umeclidinium 62 5 mcg/actuation inhaler AEPB 1 puff  1 puff Inhalation Daily     Allergies   Allergen Reactions   • Lisinopril Swelling and Cough   • Tetanus Antitoxin Anaphylaxis   • Tetanus Toxoid Anaphylaxis and Swelling       Objective   First Vitals:   Blood Pressure: 109/55 (02/20/23 2105)  Pulse: 92 (02/20/23 2105)  Temperature: 98 1 °F (36 7 °C) (02/20/23 2105)  Temp Source: Oral (02/20/23 2105)  Respirations: 18 (02/20/23 2105)  Height: 5' 8" (172 7 cm) (02/21/23 0700)  Weight - Scale: 93 4 kg (206 lb) (02/20/23 2105)  SpO2: (!) 83 % (02/20/23 2105)    Current Vitals:   Blood Pressure: 128/59 (02/21/23 0800)  Pulse: 75 (02/21/23 0815)  Temperature: (!) 97 °F (36 1 °C) (02/21/23 0700)  Temp Source: Tympanic (02/21/23 0700)  Respirations: 22 (02/21/23 0800)  Height: 5' 8" (172 7 cm) (02/21/23 0700)  Weight - Scale: 93 4 kg (206 lb) (02/20/23 2105)  SpO2: 94 % (02/21/23 0800)      Intake/Output Summary (Last 24 hours) at 2/21/2023 0911  Last data filed at 2/21/2023 0845  Gross per 24 hour   Intake 380 ml   Output 400 ml   Net -20 ml       Invasive Devices     Peripheral Intravenous Line  Duration           Peripheral IV 02/20/23 Right Antecubital <1 day          Drain  Duration           Chest Tube 1 Right Midaxillary <1 day                Physical Exam  Vitals reviewed  Constitutional:       General: He is not in acute distress  Appearance: Normal appearance  HENT:      Head: Normocephalic and atraumatic  Eyes:      Conjunctiva/sclera: Conjunctivae normal    Cardiovascular:      Rate and Rhythm: Normal rate and regular rhythm  Pulmonary:      Effort: Pulmonary effort is normal  No respiratory distress or retractions  Breath sounds: Normal breath sounds  Comments: R chest tube in place hooked up to suction with mild saturation of surrounding dressing   No air leak noted    Abdominal:      General: Abdomen is flat  There is no distension  Palpations: Abdomen is soft  Tenderness: There is no abdominal tenderness  Skin:     General: Skin is warm and dry  Neurological:      General: No focal deficit present  Mental Status: He is alert  Mental status is at baseline  Psychiatric:         Mood and Affect: Mood normal          Behavior: Behavior normal        Lab Results:   I have personally reviewed pertinent lab results  CBC:   Lab Results   Component Value Date    WBC 24 89 (H) 02/21/2023    HGB 14 4 02/21/2023    HCT 45 6 02/21/2023     (H) 02/21/2023     02/21/2023    MCH 32 6 02/21/2023    MCHC 31 6 02/21/2023    RDW 13 8 02/21/2023    MPV 10 8 02/21/2023    NRBC 0 02/21/2023     CMP:   Lab Results   Component Value Date    SODIUM 139 02/21/2023    K 4 0 02/21/2023     02/21/2023    CO2 36 (H) 02/21/2023    BUN 28 (H) 02/21/2023    CREATININE 1 12 02/21/2023    CALCIUM 8 4 02/21/2023    EGFR 62 02/21/2023     Imaging: I have personally reviewed pertinent reports  EKG, Pathology, and Other Studies: I have personally reviewed pertinent reports  Code Status: Level 1 - Full Code  Advance Directive and Living Will:      Power of : Yes  POLST:      Counseling / Coordination of Care  Total floor / unit time spent today 30 minutes  Greater than 50% of total time was spent with the patient and / or family counseling and / or coordination of care  A description of the counseling / coordination of care: evaluation of patient, review of diagnostic and laboratory studies, and discussion with attending       Finn Batres PA-C

## 2023-02-21 NOTE — ASSESSMENT & PLAN NOTE
· Likely in the setting of R pneumothorax with baseline COPD  · At baseline, requires 4L NC   · Continue supplemental O2 via nasal cannula, titrate for SpO2>88   · S/p R chest tube placement   · Encourage cough, deep breathing, IS, ambulation with assistance

## 2023-02-21 NOTE — PLAN OF CARE
DATE OF PROCEDURE:  10/13/2017    PREOPERATIVE DIAGNOSES:  1.  Right shoulder impingement syndrome.  2.  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  3.  Right shoulder superior labral tear from anterior to posterior tear.    POSTOPERATIVE DIAGNOSES:  1.  Right shoulder impingement syndrome.  2.  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  3.  Right shoulder superior labral tear from anterior to posterior tear.     PROCEDURES PERFORMED:  1.  Right shoulder arthroscopic distal clavicle excision (Jeremy procedure).  2.  Right shoulder arthroscopic subacromial decompression (acromioplasty, CA   ligament excision, bursectomy).  3.  Right shoulder arthroscopic biceps tenotomy.    SURGEON:  Spencer Zhou M.D.    ASSISTANT:  Rosa Maria Nunez CST.    COMPLICATIONS:  None.    SPECIMENS:  None.    ESTIMATED BLOOD LOSS:  3 mL.    TOURNIQUET TIME:  None.    IMPLANTS USED:  None.    POSTOPERATIVE PLAN:  The patient will follow an arthroscopic decompression/   distal clavicle excision protocol.    ANESTHESIA:  General endotracheal anesthesia plus regional.    HISTORY:  Trena Wade is a 46-year-old female with persistent right shoulder   pain, which failed nonoperative treatment.  All risks and benefits were   discussed at length and informed consent was obtained for the above-stated   procedure.    PROCEDURE IN DETAIL:  Trena Wade was taken to the Operating Room on   10/13/2017, for planned right shoulder arthroscopy.  Preoperatively, she was   given a regional nerve block as well as 900 mg of IV clindamycin within 1 hour   of the incision.  She was brought to the Operating Room and placed in the supine   position.  General endotracheal anesthesia was administered.  Examination under   anesthesia revealed full passive motion.  She was then placed in a lateral   decubitus position with an axillary roll and then a shoulder suspension device   with about 15 pounds of suspension.  Problem: Potential for Falls  Goal: Patient will remain free of falls  Description: INTERVENTIONS:  - Educate patient/family on patient safety including physical limitations  - Instruct patient to call for assistance with activity   - Consult OT/PT to assist with strengthening/mobility   - Keep Call bell within reach  - Keep bed low and locked with side rails adjusted as appropriate  - Keep care items and personal belongings within reach  - Initiate and maintain comfort rounds  - Make Fall Risk Sign visible to staff  - Offer Toileting every 2 Hours, in advance of need  - Initiate/Maintain bed alarm  - Apply yellow socks and bracelet for high fall risk patients  - Consider moving patient to room near nurses station  Outcome: Progressing     Problem: MOBILITY - ADULT  Goal: Maintain or return to baseline ADL function  Description: INTERVENTIONS:  -  Assess patient's ability to carry out ADLs; assess patient's baseline for ADL function and identify physical deficits which impact ability to perform ADLs (bathing, care of mouth/teeth, toileting, grooming, dressing, etc )  - Assess/evaluate cause of self-care deficits   - Assess range of motion  - Assess patient's mobility; develop plan if impaired  - Assess patient's need for assistive devices and provide as appropriate  - Encourage maximum independence but intervene and supervise when necessary  - Involve family in performance of ADLs  - Assess for home care needs following discharge   - Consider OT consult to assist with ADL evaluation and planning for discharge  - Provide patient education as appropriate  Outcome: Progressing  Goal: Maintains/Returns to pre admission functional level  Description: INTERVENTIONS:  - Perform BMAT or MOVE assessment daily    - Set and communicate daily mobility goal to care team and patient/family/caregiver  - Collaborate with rehabilitation services on mobility goals if consulted  - Perform Range of Motion 2 times a day    -  Her right upper extremity was then prepped   and draped in the usual sterile fashion.  A time-out procedure was performed   identifying the right shoulder as the surgical site.  A 30 mL of normal saline   were then injected with an 18-gauge spinal needle into the glenohumeral joint to   aid in capsular distension.  A standard posterior portal was established.  This   was then followed by an anterior portal established under direct visualization   with spinal needle localization.    Diagnostic arthroscopy then proceeded.  There was noted to be a degenerative   type 2 SLAP tear that appeared to be chronic in nature, diffusely from the 10   o'clock to 2 o'clock position of the superior labrum.  There is some diffuse   degenerative fraying involving the anterior and posterior labrum.  The shaver   was brought into the anterior portal and the labral fraying was debrided back to   a stable rim.  A biceps tenotomy was then performed with a meniscal biter and   the tendon was seen to retract into the bicipital groove.  The labrum was probed   and without the long head of the biceps attached it was felt to be stable and   was left alone.  The underside of the rotator cuff was completely unremarkable.    The glenoid and humeral head cartilage surfaces were unremarkable.    Subscapularis and axillary pouch were also unremarkable.    The scope was then placed in the subacromial space and some thick and chronic   appearing bursal tissue was encountered.  Through a lateral portal established   with spinal needle localization, a thorough bursectomy was performed.  The   bursal side of the rotator cuff was completely unremarkable.  A type 2 acromion   with a hypertrophied CA ligament was encountered.  The electrocautery device was   used to perform a CA ligament excision controlling of any bleeding with the   Bovie and the bur was used to perform an acromioplasty to a type 1 morphology.    A hypertrophic degenerative AC joint was  Reposition patient every 2 hours    - Dangle patient 2 times a day  - Stand patient 2 times a day  - Ambulate patient 2 times a day  - Out of bed for meals   - Out of bed for toileting  - Record patient progress and toleration of activity level   Outcome: Progressing     Problem: CARDIOVASCULAR - ADULT  Goal: Maintains optimal cardiac output and hemodynamic stability  Description: INTERVENTIONS:  - Monitor I/O, vital signs and rhythm  - Monitor for S/S and trends of decreased cardiac output  - Administer and titrate ordered vasoactive medications to optimize hemodynamic stability  - Assess quality of pulses, skin color and temperature  - Assess for signs of decreased coronary artery perfusion  - Instruct patient to report change in severity of symptoms  Outcome: Progressing  Goal: Absence of cardiac dysrhythmias or at baseline rhythm  Description: INTERVENTIONS:  - Continuous cardiac monitoring, vital signs, obtain 12 lead EKG if ordered  - Administer antiarrhythmic and heart rate control medications as ordered  - Monitor electrolytes and administer replacement therapy as ordered  Outcome: Progressing     Problem: RESPIRATORY - ADULT  Goal: Achieves optimal ventilation and oxygenation  Description: INTERVENTIONS:  - Assess for changes in respiratory status  - Assess for changes in mentation and behavior  - Position to facilitate oxygenation and minimize respiratory effort  - Oxygen administered by appropriate delivery if ordered  - Initiate smoking cessation education as indicated  - Encourage broncho-pulmonary hygiene including cough, deep breathe, Incentive Spirometry  - Assess the need for suctioning and aspirate as needed  - Assess and instruct to report SOB or any respiratory difficulty  - Respiratory Therapy support as indicated  Outcome: Progressing     Problem: METABOLIC, FLUID AND ELECTROLYTES - ADULT  Goal: Electrolytes maintained within normal limits  Description: INTERVENTIONS:  - Monitor labs and assess patient for signs and symptoms of electrolyte imbalances  - Administer electrolyte replacement as ordered  - Monitor response to electrolyte replacements, including repeat lab results as appropriate  - Instruct patient on fluid and nutrition as appropriate  Outcome: Progressing  Goal: Fluid balance maintained  Description: INTERVENTIONS:  - Monitor labs   - Monitor I/O and WT  - Instruct patient on fluid and nutrition as appropriate  - Assess for signs & symptoms of volume excess or deficit  Outcome: Progressing  Goal: Glucose maintained within target range  Description: INTERVENTIONS:  - Monitor Blood Glucose as ordered  - Assess for signs and symptoms of hyperglycemia and hypoglycemia  - Administer ordered medications to maintain glucose within target range  - Assess nutritional intake and initiate nutrition service referral as needed  Outcome: Progressing     Problem: Prexisting or High Potential for Compromised Skin Integrity  Goal: Skin integrity is maintained or improved  Description: INTERVENTIONS:  - Identify patients at risk for skin breakdown  - Assess and monitor skin integrity  - Assess and monitor nutrition and hydration status  - Monitor labs   - Assess for incontinence   - Turn and reposition patient  - Assist with mobility/ambulation  - Relieve pressure over bony prominences  - Avoid friction and shearing  - Provide appropriate hygiene as needed including keeping skin clean and dry  - Evaluate need for skin moisturizer/barrier cream  - Collaborate with interdisciplinary team   - Patient/family teaching  - Consider wound care consult   Outcome: Progressing     Problem: INFECTION - ADULT  Goal: Absence or prevention of progression during hospitalization  Description: INTERVENTIONS:  - Assess and monitor for signs and symptoms of infection  - Monitor lab/diagnostic results  - Monitor all insertion sites, i e  indwelling lines, tubes, and drains  - Monitor endotracheal if appropriate and nasal secretions then encountered and after exposing it,   the underside of it with the electrocautery device, a bur was used to perform a   distal clavicle excision, removing just over 1 cm of the distal clavicle and   also coplaning the inferior portion of the distal clavicle to prevent any   mechanical impingement of the rotator cuff muscle belly.  The superior capsule   ligaments were attempted to be retained as much as possible.  After smooth edge,   an adequate distal clavicle excision was performed, the shaver was then   introduced and any bony debris was removed.  After this was done, final   arthroscopic pictures were taken showing an unremarkable bursal side rotator   cuff a type 1 acromion morphology and a distal clavicle excision.  A drain was   then placed in the subacromial space at the posterior portal.  The portal sites   were then closed with 3-0 Prolene suture and this was followed by a soft   dressing arm sling and a [employer].  The patient was then extubated in the   Operating Room and taken to the PACU without complication.      ROWENA  dd: 10/13/2017 09:43:45 (CDT)  td: 10/13/2017 10:44:53 (HARSH)  Doc ID   #[id number]  Job ID #[id number]    CC:      for changes in amount and color  - Delton appropriate cooling/warming therapies per order  - Administer medications as ordered  - Instruct and encourage patient and family to use good hand hygiene technique  - Identify and instruct in appropriate isolation precautions for identified infection/condition  Outcome: Progressing     Problem: PAIN - ADULT  Goal: Verbalizes/displays adequate comfort level or baseline comfort level  Description: Interventions:  - Encourage patient to monitor pain and request assistance  - Assess pain using appropriate pain scale  - Administer analgesics based on type and severity of pain and evaluate response  - Implement non-pharmacological measures as appropriate and evaluate response  - Consider cultural and social influences on pain and pain management  - Notify physician/advanced practitioner if interventions unsuccessful or patient reports new pain  Outcome: Progressing     Problem: SAFETY ADULT  Goal: Patient will remain free of falls  Description: INTERVENTIONS:  - Educate patient/family on patient safety including physical limitations  - Instruct patient to call for assistance with activity   - Consult OT/PT to assist with strengthening/mobility   - Keep Call bell within reach  - Keep bed low and locked with side rails adjusted as appropriate  - Keep care items and personal belongings within reach  - Initiate and maintain comfort rounds  - Make Fall Risk Sign visible to staff  - Offer Toileting every 2 Hours, in advance of need  - Initiate/Maintain bed alarm  - Apply yellow socks and bracelet for high fall risk patients  - Consider moving patient to room near nurses station  Outcome: Progressing  Goal: Maintain or return to baseline ADL function  Description: INTERVENTIONS:  -  Assess patient's ability to carry out ADLs; assess patient's baseline for ADL function and identify physical deficits which impact ability to perform ADLs (bathing, care of mouth/teeth, toileting, grooming, dressing, etc )  - Assess/evaluate cause of self-care deficits   - Assess range of motion  - Assess patient's mobility; develop plan if impaired  - Assess patient's need for assistive devices and provide as appropriate  - Encourage maximum independence but intervene and supervise when necessary  - Involve family in performance of ADLs  - Assess for home care needs following discharge   - Consider OT consult to assist with ADL evaluation and planning for discharge  - Provide patient education as appropriate  Outcome: Progressing

## 2023-02-21 NOTE — ASSESSMENT & PLAN NOTE
· Possibly in the setting of recent peripheral nerve block for chronic back pain  · S/p R chest tube  · Maintain CT to -20 wall suction  · Repeat CXR in AM   · Continue scheduled tylenol for pain control with PRN oxy and dilaudid   · Surgery consult for CT management

## 2023-02-21 NOTE — TELEPHONE ENCOUNTER
Regarding: nerve block shot/in a lot of pain  ----- Message from Leigh Lund sent at 2/20/2023  8:08 PM EST -----  Pt called "I had a nerve block shot today and now I am in a lot of pain "

## 2023-02-21 NOTE — ASSESSMENT & PLAN NOTE
· With recent acute exacerbation   · Will continue OP prednisone taper  · Continue TID azithromycin  · Continue scheduled budesonide and xopenex  · Continue home spiriva

## 2023-02-21 NOTE — TELEPHONE ENCOUNTER
TC on call provider patient's symptoms and concerns  Advised ED disposition; called patient back and was just pulling into [employer]; advised to be seen and message would be sent to the office

## 2023-02-21 NOTE — ED NOTES
Report given to Isha Araujo RN  No additional questions at this time  Patient transported with all personal belongings, jeans, , plaid shirt, one with t-shirt  Patients cell phone  Patient states he gave his wallet to his wife        Mickey Robert RN  02/21/23 0155

## 2023-02-21 NOTE — CASE MANAGEMENT
Case Management Assessment & Discharge Planning Note    Patient name Mynor Escobar  Location ICU 02/ICU  MRN [id number]  : 1945 Date 2023       Current Admission Date: 2023  Current Admission Diagnosis:Pneumothorax on right   Patient Active Problem List    Diagnosis Date Noted   • Pneumothorax on right 2023   • Leukocytosis 2023   • Left leg pain 2023   • Transient right leg weakness 2023   • Hypokalemia 2023   • COVID 2023   • Ambulatory dysfunction 2023   • COPD (chronic obstructive pulmonary disease) ([address] ) 2023   • Right lower quadrant abdominal pain 10/23/2022   • RSV (respiratory syncytial virus infection) 10/21/2022   • Obstructive sleep apnea syndrome in adult 10/19/2022   • Sensorineural hearing loss, bilateral 10/19/2022   • Acute on chronic respiratory failure with hypoxia ([address] ) 10/15/2022   • Prostate cancer ([address] ) 2022   • Elevated MCV 2022   • Cubital tunnel syndrome on left 2022   • Carpal tunnel syndrome on left 2022   • Intercostal neuralgia 2022   • Urinary frequency 2022   • Incomplete bladder emptying 2022   • Chronic bilateral low back pain with right-sided sciatica 2022   • Mid back pain 2022   • Thoracic radiculopathy 2022   • Chronic pain syndrome 2022   • Stage 3a chronic kidney disease ([address] ) 2022   • Hoarseness or changing voice 2022   • Chronic right-sided thoracic back pain 2022   • Primary insomnia 2022   • Right hip pain 2022   • Abnormal nuclear stress test 2021   • Chest pain 2021   • Costochondral chest pain 01/15/2021   • COPD with exacerbation ([address] ) 2021   • Oxygen dependent 2021   • S/P carotid endarterectomy 2021   • Stented coronary artery 2021   • Vertigo 2021   • Anisometropia 2021   • Osteoarthritis of spinal facet joint 2021   • Chronic ischemic heart disease 01/11/2021   • Chronic diastolic (congestive) heart failure ([facility] ) 01/11/2021   • Diverticular disease of colon 01/11/2021   • Dry eye syndrome 01/11/2021   • Gastroesophageal reflux disease 01/11/2021   • Hearing loss 01/11/2021   • Elevated PSA 01/11/2021   • Hypoxia 01/11/2021   • Lens replaced by other means 01/11/2021   • Lumbar radiculopathy 01/11/2021   • Multinodular goiter 01/11/2021   • Nuclear senile cataract 01/11/2021   • Obesity 01/11/2021   • Occlusion and stenosis of carotid artery 01/11/2021   • Osteoarthritis of hip 01/11/2021   • Prediabetes 01/11/2021   • Psychosexual dysfunction with inhibited sexual excitement 01/11/2021   • LAMBERTO on CPAP 01/11/2021   • Solitary pulmonary nodule 01/11/2021   • Thyroid nodule 01/11/2021   • Guyon syndrome, 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   • Depression, recurrent ([facility] )    • Hyperlipidemia    • Coronary artery disease involving native coronary artery of native heart without angina pectoris    • Bilateral carotid artery stenosis    • Hypertension       LOS (days): 1  Geometric Mean LOS (GMLOS) (days): 5 10  Days to GMLOS:4 6     OBJECTIVE:    Risk of Unplanned Readmission Score: 36 5     Current admission status: Inpatient    Preferred Pharmacy:   [address] [place] [address] [address]  [address]   [place] [address]  Phone: [phone] Fax: [phone]    [facility] ([facility] ) - [address]  Phone: [phone] Fax: [phone]    Primary Care Provider: Efrain Estrella DO    Primary Insurance: [facility]  Secondary Insurance:     ASSESSMENT:  [address] [address] Representative - Spouse   Primary Phone: [phone] (Mobile)               Advance Directives  Does patient have a [address]?: Yes  Does patient have Advance Directives?: Yes  Advance Directives: Living will, Power of  for health care  Primary Contact: Dominique Jaramillo spouse    Readmission Root Cause  30 Day Readmission: No    Patient Information  Admitted from[de-identified] Home  Mental Status: Alert  During Assessment patient was accompanied by: Not accompanied during assessment  Assessment information provided by[de-identified] Patient  Primary Caregiver: Self  Support Systems: Family members, Spouse/significant other  [place] of Residence: [address] do you live in?: Via [employer] entry access options   Select all that apply : Stairs  Number of steps to enter home : 3  Do the steps have railings?: Yes  Type of Current Residence: 2 story home  Upon entering residence, is there a bedroom on the main floor (no further steps)?: Yes  A bedroom is located on the following floor levels of residence (select all that apply):: 2nd Floor  Upon entering residence, is there a bathroom on the main floor (no further steps)?: Yes  In the last 12 months, was there a time when you were not able to pay the mortgage or rent on time?: No  In the last 12 months, how many places have you lived?: 1  In the last 12 months, was there a time when you did not have a steady place to sleep or slept in a shelter (including now)?: No  Homeless/housing insecurity resource given?: N/A  Living Arrangements: Lives w/ Spouse/significant other  Is patient a ?: Yes  Is patient active with [facility])?: Yes  Is patient service connected?: No (Partial)    Activities of Daily Living Prior to Admission  Functional Status: Independent  Completes ADLs independently?: Yes  Ambulates independently?: Yes  Does patient use assisted devices?: Yes  Assisted Devices (DME) used: Straight Cane  Does patient currently own DME?: Yes  What DME does the patient currently own?: CPAP, Home Oxygen concentrator, Nebulizer, Portable Oxygen concentrator, Walker, Straight Cane  Does patient have a history of Outpatient Therapy (PT/OT)?: No  Does the patient have a history of Short-Term Rehab?: No  Does patient have a history of HHC?: Yes ([facility])  Does patient currently have [facility]?: No    Patient Information Continued  Income Source: Pension/penitentiary  Does patient have prescription coverage?: Yes  Within the past 12 months, you worried that your food would run out before you got the money to buy more : Never true  Within the past 12 months, the food you bought just didn't last and you didn't have money to get more : Never true  Food insecurity resource given?: N/A  Does patient receive dialysis treatments?: No  Does patient have a history of substance abuse?: No  Does patient have a history of Mental Health Diagnosis?: No    PHQ 2/9 Screening   Reviewed PHQ 2/9 Depression Screening Score?: No    Means of Transportation  Means of Transport to Appts[de-identified] Drives Self  In the past 12 months, has lack of transportation kept you from medical appointments or from getting medications?: No  In the past 12 months, has lack of transportation kept you from meetings, work, or from getting things needed for daily living?: No  Was application for public transport provided?: N/A    DISCHARGE DETAILS:    Discharge planning discussed with[de-identified] Pt     Comments - Freedom of Choice: Pt was IPA and drives  Requested [address]         Is the patient interested in [facility] at discharge?: No    DME Referral Provided  Referral made for DME?: No    Treatment Team Recommendation: Home  Discharge Destination Plan[de-identified] Home

## 2023-02-21 NOTE — TELEPHONE ENCOUNTER
Reason for Disposition  • [1] SEVERE pain AND [2] not improved 2 hours after pain medicine    Answer Assessment - Initial Assessment Questions  1  ONSET: "When did the pain start?"      Patient stated it happened after spinal injection today  Patient stated it started on the way home; pain radiates up the spine and to the right shoulder  Patient had called the office, was advised may be due to the positioning  Patient took two doses of extra strength tylenol and it helped a little bit, but now the pain is getting worse  Pain feels sharp and stabbing  2  LOCATION: "Where is the pain located?"      Mostly the right shoulder blade, but radiates down to kidney area  3  PAIN: "How bad is the pain?" (Scale 1-10; or mild, moderate, severe)    - MILD (1-3): doesn't interfere with normal activities    - MODERATE (4-7): interferes with normal activities (e g , work or school) or awakens from sleep    - SEVERE (8-10): excruciating pain, unable to do any normal activities, unable to move arm at all due to pain      9/10  4  WORK OR EXERCISE: "Has there been any recent work or exercise that involved this part of the body?"      Had spinal injection today  5  CAUSE: "What do you think is causing the shoulder pain?"      Unsure if related to the spinal injection  Unsure if he has pain in that area, feels like the pain radiates to the back  6  OTHER SYMPTOMS: "Do you have any other symptoms?" (e g , neck pain, swelling, rash, fever, numbness, weakness)      Mild neck pain, but minimal  Denies any numbness or weakness to arm  Hurts when moving, breathing, etc      Protocols used: SHOULDER PAIN-ADULT-

## 2023-02-21 NOTE — RESPIRATORY THERAPY NOTE
RT Protocol Note  Ginger Akins 65 y o  male MRN: [id number]  Unit/Bed#: ICU 02-01 Encounter: [id number]    Assessment    Principal Problem:    Pneumothorax on right  Active Problems:    Chronic ischemic heart disease    Gastroesophageal reflux disease    Prediabetes    LAMBERTO on CPAP    Stage 3a chronic kidney disease (HCC)    Acute on chronic respiratory failure with hypoxia (HCC)    COPD (chronic obstructive pulmonary disease) (Formerly [facility])    Leukocytosis      Home Pulmonary Medications:  1 puff spiriva daily,  pulmicort neb BID, Albuterol neb Q6PRN, pt ordered Edumedics , but does not take as per pt    Home Devices/Therapy: Home O2, BiPAP/CPAP, Other (Comment) (nebs/inhaler)    Past Medical History:   Diagnosis Date    Bilateral carotid artery stenosis     Cancer (Former[facility])     skin    Chronic diastolic heart failure ([facility])     Chronic ischemic heart disease     Chronic kidney disease     stage 3    Colon polyp     COPD (chronic obstructive pulmonary disease) ([facility] )     Coronary artery disease     hx stents, MI, PCI    COVID 11/2021    CPAP (continuous positive airway pressure) dependence     Emphysema of lung ([place] [address] ) 1995    started    Hearing loss     History of transfusion 1995    Hyperlipidemia     Hypertension     MI (myocardial infarction) ([place] [address] ) 1995    Myocardial infarction ([place] [address] ) 1995    Pneumonia     Prostate cancer ([place] [address] )     RSV (respiratory syncytial virus infection) 10/2022    S/P carotid endarterectomy     Shortness of breath     O2 2 l/nc PRN    Sleep apnea     Sleep apnea, obstructive     Stented coronary artery      Social History     Socioeconomic History    Marital status: /Civil Union     Spouse name: Not on file    Number of children: Not on file    Years of education: Not on file    Highest education level: Not on file   Occupational History    Not on file   Tobacco Use    Smoking status: Former     Packs/day: 2 00     Years: 35 00     Pack years: 70 00     Types: Cigarettes     Start date: 1/1/1960 Quit date: 1995     Years since quittin 6    Smokeless tobacco: Never    Tobacco comments:     stopped    Vaping Use    Vaping Use: Never used   Substance and Sexual Activity    Alcohol use: Never    Drug use: Never    Sexual activity: Yes     Partners: Female   Other Topics Concern    Not on file   Social History Narrative    Not on file     Social Determinants of Health     Financial Resource Strain: Not on file   Food Insecurity: No Food Insecurity    Worried About Running Out of Food in the Last Year: Never true    [address] in the Last Year: Never true   Transportation Needs: No Transportation Needs    Lack of Transportation (Medical): No    Lack of Transportation (Non-Medical): No   Physical Activity: Not on file   Stress: Not on file   Social Connections: Not on file   Intimate Partner Violence: Not on file   Housing Stability: Low Risk     Unable to Pay for Housing in the Last Year: No    Number of Places Lived in the Last Year: 1    Unstable Housing in the Last Year: No       Subjective         Objective    Physical Exam:   Assessment Type: Assess only  General Appearance: Alert, Awake  Respiratory Pattern: Normal  Bilateral Breath Sounds: Diminished  Cough: None  O2 Device: nc    Vitals:  Blood pressure 134/66, pulse 75, temperature 97 9 °F (36 6 °C), temperature source Tympanic, resp  rate 21, weight 93 4 kg (206 lb), SpO2 92 %  Imaging and other studies: I have personally reviewed pertinent reports  23 0300   Respiratory Protocol   Protocol Initiated? Yes   Protocol Selection Respiratory   Language Barrier? No   Medical & Social History Reviewed? Yes   Diagnostic Studies Reviewed? Yes   Physical Assessment Performed? Yes   Home Devices/Therapy Home O2;BiPAP/CPAP;Other (Comment)  (nebs/inhaler)   Respiratory Plan Home Bronchodilator Patient pathway   Respiratory Assessment   Assessment Type Assess only   General Appearance Alert; Awake   Respiratory Pattern Normal Bilateral Breath Sounds Diminished   Cough None   Resp Comments   (pt assessed as per protocol  pt takes pulmicort bid, spiriva inhaler daily  and albuterol q6prn  pt currently on 5lnc pt with R chest tube  pt does wear cpap HS at home    pt already ordered  xopenex tid, pulmicort bid  will continue to monitor pt)   O2 Device nc   Additional Assessments   Pulse 75   Respirations 22   SpO2 92 %         O2 Device: nc     Plan    Respiratory Plan: Home Bronchodilator Patient pathway        Resp Comments:  (HFNC remains on stby   pt currently on 5lnc will continue to monitor pt)

## 2023-02-21 NOTE — ED PROVIDER NOTES
History  Chief Complaint   Patient presents with   • Chest Pain     Patient reports having  a nerve block procedure done today, now reports having 6/10 pain in his rib/ back region of his right side  Patient complains of having a hard time taking a deep breath  HPI    Is a pleasant, ill-appearing, 74-year-old gentleman presents emergency department with multiple significant past medical history diagnoses which include the following: Lateral carotid stenosis, chronic diastolic heart failure, chronic kidney disease stage III, COPD on home oxygen 2 to 3 L 24 hours a day, prior COVID infection, prior MI with stents, hyperlipidemia, pretension, CPAP dependent at night, notes emergency department abrupt onset of shortness of breath status post right-sided intercostal nerve block today at 8:28 am by Dr [facility] [place]  Patient felt SOB after the procedure, no chest pain, no syncope  She reports that the pain is worse when he lays down  No history of fever, chills, trauma  Prior to Admission Medications   Prescriptions Last Dose Informant Patient Reported? Taking?    Blood Pressure Monitor KIT 2/20/2023  No Yes   Sig: Use daily   Patient taking differently: Use 40 mg daily   Tiotropium Bromide Monohydrate (SPIRIVA RESPIMAT IN) 2/20/2023  Yes Yes   Sig: Inhale every morning   albuterol (2 5 mg/3 mL) 0 083 % nebulizer solution 2/20/2023  No Yes   Sig: Take 3 mL (2 5 mg total) by nebulization every 6 (six) hours as needed for wheezing or shortness of breath   arformoterol (BROVANA) 15 mcg/2 mL nebulizer solution Not Taking  No No   Sig: Take 2 mL (15 mcg total) by nebulization 2 (two) times a day   Patient not taking: Reported on 2/21/2023   aspirin 81 mg chewable tablet 2/20/2023  Yes Yes   Sig: CHEW ONE TABLET BY MOUTH EVERY DAY   azithromycin (ZITHROMAX) 500 MG tablet 2/20/2023  No Yes   Sig: Take 1 tablet (500 mg total) by mouth 3 (three) times a week   benzonatate (TESSALON) 200 MG capsule Not Taking  No No   Sig: Take 1 capsule (200 mg total) by mouth 3 (three) times a day as needed for cough   Patient not taking: Reported on 2/21/2023   budesonide (PULMICORT) 0 5 mg/2 mL nebulizer solution 2/20/2023  No Yes   Sig: Take 2 mL (0 5 mg total) by nebulization every 12 (twelve) hours Rinse mouth after use  dextromethorphan-guaiFENesin (ROBITUSSIN DM)  mg/5 mL syrup 2/20/2023  No Yes   Sig: Take 5 mL by mouth 3 (three) times a day as needed for cough   famotidine (PEPCID) 20 mg tablet 2/20/2023  No Yes   Sig: Take 1 tablet (20 mg total) by mouth daily at bedtime   fluticasone (FLONASE) 50 mcg/act nasal spray 2/20/2023  Yes Yes   Sig: into each nostril as needed    furosemide (LASIX) 40 mg tablet 2/20/2023  No Yes   Sig: Take 1 tablet (40 mg total) by mouth daily   gabapentin (NEURONTIN) 300 mg capsule 2/20/2023  No Yes   Sig: Take 2 capsules (600 mg total) by mouth 2 (two) times a day   metoprolol succinate (TOPROL-XL) 25 mg 24 hr tablet 2/20/2023  No Yes   Sig: Take 0 5 tablets (12 5 mg total) by mouth 2 (two) times a day   oxygen gas 2/20/2023  Yes Yes   Sig: Inhale 2 L/min continuous   2LPM at rest and 3LPM with activity per D Cedric FANG notes  Indications: SOB, pulmonary edema suspected   potassium chloride (Klor-Con) 10 mEq tablet 2/20/2023  No Yes   Sig: Take 2 tablets (20 mEq total) by mouth daily   predniSONE 10 mg tablet 2/20/2023  No Yes   Sig: Take 4 tabs daily for 3 days then 3 tabs daily for 3 days then 2 tabs daily for 3 days then 1 tab daily for 3 days   rosuvastatin (CRESTOR) 40 MG tablet 2/20/2023  No Yes   Sig: TAKE 1 TABLET DAILY   Patient taking differently: Take 40 mg by mouth daily at bedtime      Facility-Administered Medications: None       Past Medical History:   Diagnosis Date   • Bilateral carotid artery stenosis    • Cancer (HCC)     skin   • Chronic diastolic heart failure (HCC)    • Chronic ischemic heart disease    • Chronic kidney disease     stage 3   • Colon polyp    • COPD (chronic obstructive pulmonary disease) (HCC)    • Coronary artery disease     hx stents, MI, PCI   • COVID 11/2021   • CPAP (continuous positive airway pressure) dependence    • Emphysema of lung ([facility] [address] ) 1995 started   • Hearing loss    • History of transfusion 1995   • Hyperlipidemia    • Hypertension    • [facility]   • Myocardial infarction ([facility] ) 1995   • Pneumonia    • Prostate cancer ([place] [address] )    • RSV (respiratory syncytial virus infection) 10/2022   • S/P carotid endarterectomy    • Shortness of breath     O2 2 l/nc PRN   • Sleep apnea    • Sleep apnea, obstructive    • Stented coronary artery        Past Surgical History:   Procedure Laterality Date   • APPENDECTOMY     • CARDIAC CATHETERIZATION  03/18/2021    left main with no significant disease, proximal LAD with 10% stenosis at the site of prior stent, left circumflex artery with minimal luminal irregularities mid RCA with 50% stenosis at site of prior stent and PL segment with distal disease supplied by collaterals from the distal circumflex with no significant CAD requiring revascularization at that time     • CATARACT EXTRACTION, BILATERAL Bilateral    • COLONOSCOPY     • CORONARY ANGIOPLASTY WITH STENT PLACEMENT  1999    RCA   • CORONARY ANGIOPLASTY WITH STENT PLACEMENT  2001    RCA   • CORONARY ANGIOPLASTY WITH STENT PLACEMENT  2003    LAD   • EYE SURGERY     • CT BX PROSTATE STRTCTC SATURATION SAMPLING IMG GID N/A 09/13/2022    Procedure: TRANSPERINEAL MRI FUSION  BIOPSY PROSTATE;  Surgeon: Damien Bronson MD;  Location: [facility];  Service: Urology   • CT NEUROPLASTY &/TRANSPOS MEDIAN NRV CARPAL Jacobo Dove Left 07/22/2022    Procedure: RELEASE CARPAL TUNNEL;  Surgeon: Quita Dalal MD;  Location: [facility];  Service: Orthopedics   • CT NEUROPLASTY &/TRANSPOSITION ULNAR NERVE ELBOW Left 07/22/2022    Procedure: RELEASE CUBITAL TUNNEL;  Surgeon: Quita Dalal MD;  Location[facility];  Service: Orthopedics   • MT NEUROPLASTY &/TRANSPOSITION ULNAR NERVE WRIST Left 2022    Procedure: Alan Mixon;  Surgeon: Lam Disla MD;  Location: [facility];  Service: Orthopedics   • SKIN CANCER EXCISION      chin-per pt, basal cell  also right ankle       Family History   Problem Relation Age of Onset   • Heart attack Mother    • Dementia Mother    • Heart failure Mother             • No Known Problems Father      I have reviewed and agree with the history as documented  E-Cigarette/Vaping   • E-Cigarette Use Never User      E-Cigarette/Vaping Substances   • Nicotine No    • THC No    • CBD No    • Flavoring No    • Other No    • Unknown No      Social History     Tobacco Use   • Smoking status: Former     Packs/day: 2 00     Years: 35 00     Pack years: 70 00     Types: Cigarettes     Start date: 1960     Quit date: 1995     Years since quittin 6   • Smokeless tobacco: Never   • Tobacco comments:     stopped    Vaping Use   • Vaping Use: Never used   Substance Use Topics   • Alcohol use: Never   • Drug use: Never       Review of Systems   Constitutional: Negative  Negative for diaphoresis and fever  HENT: Negative  Negative for facial swelling and tinnitus  Eyes: Negative  Respiratory: Positive for chest tightness and shortness of breath  Negative for cough, choking and wheezing  Cardiovascular: Negative  Negative for chest pain, palpitations and leg swelling  Gastrointestinal: Negative  Endocrine: Negative  Genitourinary: Negative  Musculoskeletal: Negative  Skin: Negative  Negative for pallor  Allergic/Immunologic: Negative  Neurological: Negative  Hematological: Negative  Psychiatric/Behavioral: Negative  Physical Exam  Physical Exam  Vitals and nursing note reviewed  Constitutional:       General: He is in acute distress  Appearance: He is well-developed  He is ill-appearing and toxic-appearing     HENT: Head: Normocephalic and atraumatic  Eyes:      Extraocular Movements: Extraocular movements intact  Pupils: Pupils are equal, round, and reactive to light  Cardiovascular:      Rate and Rhythm: Normal rate and regular rhythm  Heart sounds: Normal heart sounds  Pulmonary:      Effort: Tachypnea, accessory muscle usage and respiratory distress present  Breath sounds: Examination of the right-middle field reveals decreased breath sounds  Examination of the right-lower field reveals decreased breath sounds and wheezing  Examination of the left-lower field reveals wheezing  Decreased breath sounds and wheezing present  Abdominal:      General: Bowel sounds are normal       Palpations: Abdomen is soft  Musculoskeletal:         General: Normal range of motion  Cervical back: Normal range of motion and neck supple  Right lower leg: Edema present  Left lower leg: Edema present  Skin:     General: Skin is warm  Capillary Refill: Capillary refill takes less than 2 seconds  Neurological:      General: No focal deficit present  Mental Status: He is alert and oriented to person, place, and time     Psychiatric:         Mood and Affect: Mood normal          Vital Signs  ED Triage Vitals   Temperature Pulse Respirations Blood Pressure SpO2   02/20/23 2105 02/20/23 2105 02/20/23 2105 02/20/23 2105 02/20/23 2105   98 1 °F (36 7 °C) 92 18 109/55 (!) 83 %      Temp Source Heart Rate Source Patient Position - Orthostatic VS BP Location FiO2 (%)   02/20/23 2105 02/20/23 2105 02/20/23 2105 02/21/23 0200 --   Oral Monitor Sitting Left arm       Pain Score       02/20/23 2145       7           Vitals:    02/21/23 0200 02/21/23 0215 02/21/23 0245 02/21/23 0300   BP: 122/60 114/55 141/66 137/61   Pulse: 80 79 77 75   Patient Position - Orthostatic VS: Lying Lying Lying Lying         Visual Acuity      ED Medications  Medications   azithromycin (ZITHROMAX) tablet 500 mg (500 mg Oral Given 2/21/23 0046)   benzonatate (TESSALON PERLES) capsule 200 mg (200 mg Oral Given 2/21/23 0046)   budesonide (PULMICORT) inhalation solution 0 5 mg (0 5 mg Nebulization Not Given 2/21/23 0059)   famotidine (PEPCID) tablet 20 mg (20 mg Oral Given 2/21/23 0243)   fluticasone (FLONASE) 50 mcg/act nasal spray 1 spray (has no administration in time range)   furosemide (LASIX) tablet 40 mg (has no administration in time range)   gabapentin (NEURONTIN) capsule 600 mg (has no administration in time range)   metoprolol succinate (TOPROL-XL) 24 hr tablet 12 5 mg (12 5 mg Oral Given 2/21/23 0243)   atorvastatin (LIPITOR) tablet 80 mg (has no administration in time range)   heparin (porcine) subcutaneous injection 5,000 Units (5,000 Units Subcutaneous Given 2/21/23 0046)   acetaminophen (TYLENOL) tablet 975 mg (975 mg Oral Given 2/21/23 0046)   oxyCODONE (ROXICODONE) IR tablet 5 mg (5 mg Oral Given 2/21/23 0212)     Or   oxyCODONE (ROXICODONE) immediate release tablet 10 mg ( Oral See Alternative 2/21/23 0212)   HYDROmorphone HCl (DILAUDID) injection 0 2 mg (has no administration in time range)   levalbuterol (XOPENEX) inhalation solution 1 25 mg (has no administration in time range)   umeclidinium 62 5 mcg/actuation inhaler AEPB 1 puff (has no administration in time range)   predniSONE tablet 30 mg (has no administration in time range)     Followed by   predniSONE tablet 20 mg (has no administration in time range)     Followed by   predniSONE tablet 10 mg (has no administration in time range)   insulin lispro (HumaLOG) 100 units/mL subcutaneous injection 1-6 Units (has no administration in time range)   insulin lispro (HumaLOG) 100 units/mL subcutaneous injection 1-6 Units (1 Units Subcutaneous Not Given 2/21/23 0314)   dextromethorphan-guaiFENesin (ROBITUSSIN DM) oral syrup 10 mL (10 mL Oral Given 2/21/23 0313)   aspirin (ECOTRIN LOW STRENGTH) EC tablet 81 mg (has no administration in time range)   lidocaine (PF) (XYLOCAINE-MPF) 1 % injection 10 mL (10 mL Infiltration Given by Other 2/20/23 2145)   lidocaine (PF) (XYLOCAINE-MPF) 1 % injection 10 mL (10 mL Infiltration Given by Other 2/20/23 2145)   fentanyl citrate (PF) 100 MCG/2ML 25 mcg (25 mcg Intravenous Given 2/20/23 2145)   LORazepam (ATIVAN) injection 0 5 mg (0 5 mg Intravenous Given 2/20/23 2144)   fentanyl citrate (PF) 100 MCG/2ML 25 mcg (25 mcg Intravenous Given 2/20/23 2223)   fentanyl citrate (PF) 100 MCG/2ML 50 mcg (50 mcg Intravenous Given 2/20/23 2218)   aspirin chewable tablet 81 mg (81 mg Oral Given 2/21/23 0046)   fentanyl citrate (PF) 100 MCG/2ML 50 mcg (50 mcg Intravenous Given 2/21/23 0019)   lidocaine (PF) (XYLOCAINE-MPF) 1 % injection 10 mL (10 mL Infiltration Given 2/21/23 0019)       Diagnostic Studies  Results Reviewed     Procedure Component Value Units Date/Time    Basic metabolic panel [[id number]]     Lab Status: No result Specimen: Blood     CBC and differential [[id number]]     Lab Status: No result Specimen: Blood     Magnesium [[id number]]     Lab Status: No result Specimen: Blood     Phosphorus [[id number]]     Lab Status: No result Specimen: Blood     Manual Differential(PHLEBS Do Not Order) [[id number]]  (Abnormal) Collected: 02/20/23 2123    Lab Status: Final result Specimen: Blood from Arm, Right Updated: 02/20/23 6643     Segmented % 89 %      Bands % 3 %      Lymphocytes % 2 %      Monocytes % 6 %      Eosinophils, % 0 %      Basophils % 0 %      Absolute Neutrophils 25 93 Thousand/uL      Lymphocytes Absolute 0 56 Thousand/uL      Monocytes Absolute 1 69 Thousand/uL      Eosinophils Absolute 0 00 Thousand/uL      Basophils Absolute 0 00 Thousand/uL      Total Counted --     RBC Morphology Present     Anisocytosis Present     [place] Cells Present     Tear Drop Cells Present     Platelet Estimate Adequate     Large Platelet Present    CBC and differential [[id number]]  (Abnormal) Collected: 02/20/23 2123    Lab Status: Final result Specimen: Blood from Arm, Right Updated: 02/20/23 2354     WBC 28 19 Thousand/uL      RBC 4 86 Million/uL      Hemoglobin 15 9 g/dL      Hematocrit 50 7 %       fL      MCH 32 7 pg      MCHC 31 4 g/dL      RDW 13 7 %      MPV 11 9 fL      Platelets 067 Thousands/uL     Narrative: This is an appended report  These results have been appended to a previously verified report  Basic metabolic panel [[id number]]  (Abnormal) Collected: 02/20/23 2123    Lab Status: Final result Specimen: Blood from Arm, Right Updated: 02/20/23 2354     Sodium 140 mmol/L      Potassium 3 6 mmol/L      Chloride 96 mmol/L      CO2 39 mmol/L      ANION GAP 5 mmol/L      BUN 26 mg/dL      Creatinine 1 29 mg/dL      Glucose 196 mg/dL      Calcium 9 3 mg/dL      eGFR 52 ml/min/1 73sq m     Narrative:      [place]ette guidelines for Chronic Kidney Disease (CKD):   •  Stage 1 with normal or high GFR (GFR > 90 mL/min/1 73 square meters)  •  Stage 2 Mild CKD (GFR = 60-89 mL/min/1 73 square meters)  •  Stage 3A Moderate CKD (GFR = 45-59 mL/min/1 73 square meters)  •  Stage 3B Moderate CKD (GFR = 30-44 mL/min/1 73 square meters)  •  Stage 4 Severe CKD (GFR = 15-29 mL/min/1 73 square meters)  •  Stage 5 End Stage CKD (GFR <15 mL/min/1 73 square meters)  Note: GFR calculation is accurate only with a steady state creatinine                 XR chest portable   ED Interpretation by Griffin Cano III, DO (02/21 0032)   No PTX noted, the kinking of the chest tube does note appear to be a pronounced  XR chest 1 view portable   ED Interpretation by Griffin Cano III, DO (02/20 2244)   PTX resolved, Chest tube on the R sided appears to be in fissure of R lobe        XR chest 1 view portable   ED Interpretation by Griffin Cano III, DO (02/20 2126)   Small 15 % PTX on R side of the chest      XR chest portable    (Results Pending)   XR chest portable ICU    (Results Pending)              Procedures  ECG 12 Lead Documentation Only    Date/Time: 2/20/2023 9:23 PM  Performed by: Rachel Mendoza DO  Authorized by: Lisa Freed III, DO     Indications / Diagnosis:  CHEST PAIN  ECG reviewed by me, the ED Provider: no    Patient location:  ED  Comments:      Personally reviewed this EKG that was performed the patient February 28, 2023, EKG was completed at 9:21 PM and interpreted by me at 9:23 PM, sinus rhythm medication bases, otherwise no acute ST abnormalities, ventricular rate of 91 bpm     No diffuse elevations to indicate pericarditis  No coved ST elevations greater than 2mm with negative T waves in V1-3 to indicate concern for brugada  No biphasic T waves in V2, V3 to indicate Wellens (critical stenosis of LAD)  No elevation in aVR or deviation when compared to V1 (can be associated with ST depression in I,II, V4-6 when left main occlusion is present)  Chest Tube    Date/Time: 2/20/2023 9:50 PM  Performed by: Rachel Mendoza DO  Authorized by: Rachel Mendoza DO     Patient location:  ED and bedside  Other Assisting Provider: No    Consent:     Consent obtained:  Written and emergent situation    Consent given by:  Patient    Risks discussed:  Bleeding, incomplete drainage, nerve damage, pain, infection and damage to surrounding structures    Alternatives discussed:  No treatment  Universal protocol:     Patient identity confirmed:  Verbally with patient  Pre-procedure details:     Skin preparation:  Alcohol, Betadine, ChloraPrep and Hibiclens    Preparation: Patient was prepped and draped in the usual sterile fashion    Indications:     Indications: pneumothroax    Anesthesia (see MAR for exact dosages):      Anesthesia method:  None  Procedure details:     Placement location:  Lateral (Mid axillary line)    Laterality:  Right    Approach:  Open    Scalpel size:  10    Tube size (Fr):  20    Dissection instrument:  Finger    Tube connected to:  Suction    Drainage characteristics:  Air only    Suture material:  0 silk    Dressing:  4x4 sterile gauze and Xeroform gauze  Post-procedure details:     Post-insertion x-ray findings: tube in good position      Patient tolerance of procedure: Tolerated well, no immediate complications  CriticalCare Time  Performed by: Luis Angel Kelley DO  Authorized by: Luis Angel Kelley DO     Critical care provider statement:     Critical care time (minutes):  76    Critical care start time:  2/20/2023 9:07 PM    Critical care end time:  2/21/2023 12:34 AM    Critical care was necessary to treat or prevent imminent or life-threatening deterioration of the following conditions:  Cardiac failure, circulatory failure, CNS failure or compromise, dehydration and respiratory failure    Critical care was time spent personally by me on the following activities:  Evaluation of patient's response to treatment, examination of patient, discussions with primary provider, development of treatment plan with patient or surrogate, obtaining history from patient or surrogate, review of old charts, re-evaluation of patient's condition, ordering and review of radiographic studies, ordering and review of laboratory studies and ordering and performing treatments and interventions  ECG 12 Lead Documentation Only    Date/Time: 2/20/2023 11:20 PM  Performed by: Luis Angel Kelley DO  Authorized by: Naren Bernal III, DO     Indications / Diagnosis:  Resolved chest pain  ECG reviewed by me, the ED Provider: yes    Patient location:  ED  Comments:      Personally reviewed this EKG that was performed the patient February 20, 2023, EKG was completed at 11:21 PM and interpreted by me the same time, normal sinus rhythm with no acute ST abnormalities, ventricular rate of 87/min, no other acute ST abnormalities  No diffuse elevations to indicate pericarditis    No coved ST elevations greater than 2mm with negative T waves in V1-3 to indicate concern for brugada  No biphasic T waves in V2, V3 to indicate Wellens (critical stenosis of LAD)  No elevation in aVR or deviation when compared to V1 (can be associated with ST depression in I,II, V4-6 when left main occlusion is present)  ED Course  ED Course as of 02/21/23 0401   Mon Feb 20, 2023 2116 Patient seen evaluated, initial O2 saturations 83%, status post right-sided intercoastal nerve  R sided block today, O2 sat on 6 liters up to 90 %, up from 86 % on 5 liters, hx of COPD, normally on 2-3 liters of nasal cannula 24 / 7, clinical concern about PTX, STAT CXR ordered  2118 CXR at bedside  2119 Reviewed CXR, at bedside on xray machine, appears pt has a 10 -15 % R sided PTX    2126 CXR confirmed PTX, will consent pt    2130 Consent obtained  2150 Procedure started  2225 Procedure completed  2233 CXR at bedside   2234 CXR showed PTX has resolved, will consult surgery  Appears that chest tube is in fissure of R lung, O2 sat: 94 %, decreased oxygen requirement: down to 3 liters which is pt baseline  2243 Surgery tiger texted  2244 Case reviewed with Dr Jah Gunter will place consult to surgery, admit to [facility] Updated family regarding plan of care going forward  2344 Called to the room, by critical care AP, pt chest tube appeared to be not connected to pleur-evac, O2 sat increased to 6 liters, STAT CXR ordered  2346 CXR which was repeated: chest tube appears to be kinked,  INOCENTE Juarez at bedside   Tue Feb 21, 2023   0023 Chest tube was repositioned, sutures were taken down and the chest tube was repositioned and sutured back into place with 0 silk with critical care at the bedside  White count appears to be acute phase reactant  Prior to re-adjusting the chest tube, pt had significant amount of increased work of breathing, that has since resolved  8556 Patient reassessed, O2: 95 %, speaking in full sentences                              Initial Sepsis Screening     Row Name 02/20/23 2200                Is the patient's history suggestive of a new or worsening infection? No  -GC              User Key  (r) = Recorded By, (t) = Taken By, (c) = Cosigned By    [address] Name Provider Type    GC Irven Boast, DO Physician                SBIRT 20yo+    Flowsheet Row Most Recent Value   SBIRT (23 yo +)    In order to provide better care to our patients, we are screening all of our patients for alcohol and drug use  Would it be okay to ask you these screening questions? Yes Filed at: 02/20/2023 2118   Initial Alcohol Screen: US AUDIT-C     1  How often do you have a drink containing alcohol? 0 Filed at: 02/20/2023 2118   2  How many drinks containing alcohol do you have on a typical day you are drinking? 0 Filed at: 02/20/2023 2118   3a  Male UNDER 65: How often do you have five or more drinks on one occasion? 0 Filed at: 02/20/2023 2118   3b  FEMALE Any Age, or MALE 65+: How often do you have 4 or more drinks on one occassion? 0 Filed at: 02/20/2023 2118   Audit-C Score 0 Filed at: 02/20/2023 2118   CRISTOPHER: How many times in the past year have you    Used an illegal drug or used a prescription medication for non-medical reasons?  Never Filed at: 02/20/2023 2118                    Medical Decision Making  Iatrogenic PTX on R side in intercoastal nerve block done earlier today by pain management, hx of COPD on chronic O2: 2-3 liters of NC, O2 sat: low 80 %, Chest tube without difficulty it was decided the patient will go with a open technique versus percutaneous incersion of a pig tail catheter due to body habitus / large degree of excess chest wall adipose tissue / hx of gynecomastia from previous steroid use, , see ED course for specifics, it appears that the patient may have moved and the bottom portion of the chest tube connected to the Pleur-evac to disconnected, critical care reassessed the patient noted that the chest tube needed to be repositioned, I performed this procedure at the bedside with critical care present  After repositioning the chest tube, patient's symptoms were resolved, saturating stable greater than 94%, no conversational dyspnea  This patient was examined during the Covid-19 pandemic, and appropriate PPE was employed as defined by OSHA to minimize exposure to the patient and to avoid spread in the event that I am an asymptomatic carrier  All efforts were made to avoid direct contact with the patient per CDC guidelines ("social distancing") unless otherwise necessary to rule out a medical emergency and/or to provide life-saving interventions  Donning and doffing of PPE was performed per recommended guidelines, and personal PPE was employed if /when institutional PPE was not readily available or was deemed to be less than the recommended as defined by OSHA  Portions of the record may have been created with voice recognition software  Occasional wrong word or "sound a like" substitutions may have occurred due to the inherent limitations of voice recognition software  Read the chart carefully and recognize, using context, where substitutions have occurred  Pneumothorax on right: acute illness or injury  Amount and/or Complexity of Data Reviewed  Radiology: ordered and independent interpretation performed  Risk  Prescription drug management  Decision regarding hospitalization            Disposition  Final diagnoses:   Pneumothorax on right   Hypoxia     Time reflects when diagnosis was documented in both MDM as applicable and the Disposition within this note     Time User Action Codes Description Comment    2/20/2023 10:47 PM Shannen Hernandes [J93 [id number]] Pneumothorax on right     2/21/2023  4:00 AM Shannen Hernandes [R09 [id number]] Hypoxia       ED Disposition     ED Disposition   Admit    Condition   Stable    Date/Time   Mon Feb 20, 2023 11:00 PM    Comment   Case was discussed with Elise Pascual PA-C  and the patient's admission status was agreed to be Admission Status: inpatient status to the service of Dr Madison Mendez   Follow-up Information    None         Current Discharge Medication List      CONTINUE these medications which have NOT CHANGED    Details   albuterol (2 5 mg/3 mL) 0 083 % nebulizer solution Take 3 mL (2 5 mg total) by nebulization every 6 (six) hours as needed for wheezing or shortness of breath  Qty: 60 mL, Refills: 1    Associated Diagnoses: COPD exacerbation (HCC)      aspirin 81 mg chewable tablet CHEW ONE TABLET BY MOUTH EVERY DAY      azithromycin (ZITHROMAX) 500 MG tablet Take 1 tablet (500 mg total) by mouth 3 (three) times a week  Qty: 12 tablet, Refills: 2    Associated Diagnoses: Chronic bronchitis, unspecified chronic bronchitis type ([facility])      Blood Pressure Monitor KIT Use daily  Qty: 1 kit, Refills: 0    Associated Diagnoses: Primary hypertension      budesonide (PULMICORT) 0 5 mg/2 mL nebulizer solution Take 2 mL (0 5 mg total) by nebulization every 12 (twelve) hours Rinse mouth after use    Qty: 120 mL, Refills: 0    Associated Diagnoses: Chronic obstructive pulmonary disease with acute exacerbation ([facility])      dextromethorphan-guaiFENesin (ROBITUSSIN DM)  mg/5 mL syrup Take 5 mL by mouth 3 (three) times a day as needed for cough  Qty: 354 mL, Refills: 0    Associated Diagnoses: COPD with exacerbation ([facility])      famotidine (PEPCID) 20 mg tablet Take 1 tablet (20 mg total) by mouth daily at bedtime  Qty: 100 tablet, Refills: 1    Associated Diagnoses: Gastroesophageal reflux disease, unspecified whether esophagitis present      fluticasone (FLONASE) 50 mcg/act nasal spray into each nostril as needed       furosemide (LASIX) 40 mg tablet Take 1 tablet (40 mg total) by mouth daily  Qty: 100 tablet, Refills: 3    Associated Diagnoses: Chronic ischemic heart disease      gabapentin (NEURONTIN) 300 mg capsule Take 2 capsules (600 mg total) by mouth 2 (two) times a day  Qty: 400 capsule, Refills: 1 Associated Diagnoses: Chronic pain syndrome; Intercostal neuralgia; Chronic right-sided thoracic back pain      metoprolol succinate (TOPROL-XL) 25 mg 24 hr tablet Take 0 5 tablets (12 5 mg total) by mouth 2 (two) times a day  Qty: 30 tablet, Refills: 0    Associated Diagnoses: Chest pain, unspecified type      oxygen gas Inhale 2 L/min continuous  2LPM at rest and 3LPM with activity per D Cedric FANG notes  Indications: SOB, pulmonary edema suspected      potassium chloride (Klor-Con) 10 mEq tablet Take 2 tablets (20 mEq total) by mouth daily  Qty: 200 tablet, Refills: 3    Associated Diagnoses: Essential hypertension      predniSONE 10 mg tablet Take 4 tabs daily for 3 days then 3 tabs daily for 3 days then 2 tabs daily for 3 days then 1 tab daily for 3 days  Qty: 30 tablet, Refills: 0    Associated Diagnoses: COPD with exacerbation (HCC)      rosuvastatin (CRESTOR) 40 MG tablet TAKE 1 TABLET DAILY  Qty: 100 tablet, Refills: 3    Associated Diagnoses: Hyperlipidemia, unspecified hyperlipidemia type      Tiotropium Bromide Monohydrate (SPIRIVA RESPIMAT IN) Inhale every morning      arformoterol (BROVANA) 15 mcg/2 mL nebulizer solution Take 2 mL (15 mcg total) by nebulization 2 (two) times a day  Qty: 360 mL, Refills: 3    Associated Diagnoses: Chronic obstructive pulmonary disease, unspecified COPD type (HCC)      benzonatate (TESSALON) 200 MG capsule Take 1 capsule (200 mg total) by mouth 3 (three) times a day as needed for cough  Qty: 20 capsule, Refills: 0    Associated Diagnoses: Acute cough             No discharge procedures on file      PDMP Review       Value Time User    PDMP Reviewed  Yes 7/22/2022  8:46 AM Levi Church PA-C          ED Provider  Electronically Signed by           Andrés Peña III, DO  02/21/23 Yogi Padgett 83 III, DO  02/21/23 Yogi Padgett 83 III, DO  02/21/23 [id number]

## 2023-02-22 ENCOUNTER — APPOINTMENT (INPATIENT)
Dept: RADIOLOGY | Facility: HOSPITAL | Age: 78
End: 2023-02-22

## 2023-02-22 ENCOUNTER — APPOINTMENT (INPATIENT)
Dept: CT IMAGING | Facility: HOSPITAL | Age: 78
End: 2023-02-22

## 2023-02-22 DIAGNOSIS — R07.9 CHEST PAIN, UNSPECIFIED TYPE: ICD-10-CM

## 2023-02-22 DIAGNOSIS — J44.1 CHRONIC OBSTRUCTIVE PULMONARY DISEASE WITH ACUTE EXACERBATION (HCC): ICD-10-CM

## 2023-02-22 LAB
ANION GAP SERPL CALCULATED.3IONS-SCNC: 1 MMOL/L (ref 4–13)
BUN SERPL-MCNC: 23 MG/DL (ref 5–25)
CALCIUM SERPL-MCNC: 9 MG/DL (ref 8.4–10.2)
CHLORIDE SERPL-SCNC: 99 MMOL/L (ref 96–108)
CO2 SERPL-SCNC: 40 MMOL/L (ref 21–32)
CREAT SERPL-MCNC: 1.07 MG/DL (ref 0.6–1.3)
ERYTHROCYTE [DISTWIDTH] IN BLOOD BY AUTOMATED COUNT: 13.6 % (ref 11.6–15.1)
GFR SERPL CREATININE-BSD FRML MDRD: 66 ML/MIN/1.73SQ M
GLUCOSE SERPL-MCNC: 117 MG/DL (ref 65–140)
GLUCOSE SERPL-MCNC: 117 MG/DL (ref 65–140)
GLUCOSE SERPL-MCNC: 138 MG/DL (ref 65–140)
GLUCOSE SERPL-MCNC: 142 MG/DL (ref 65–140)
GLUCOSE SERPL-MCNC: 237 MG/DL (ref 65–140)
HCT VFR BLD AUTO: 46.9 % (ref 36.5–49.3)
HGB BLD-MCNC: 15 G/DL (ref 12–17)
MCH RBC QN AUTO: 33.1 PG (ref 26.8–34.3)
MCHC RBC AUTO-ENTMCNC: 32 G/DL (ref 31.4–37.4)
MCV RBC AUTO: 104 FL (ref 82–98)
PLATELET # BLD AUTO: 180 THOUSANDS/UL (ref 149–390)
PMV BLD AUTO: 11.7 FL (ref 8.9–12.7)
POTASSIUM SERPL-SCNC: 4 MMOL/L (ref 3.5–5.3)
RBC # BLD AUTO: 4.53 MILLION/UL (ref 3.88–5.62)
SODIUM SERPL-SCNC: 140 MMOL/L (ref 135–147)
WBC # BLD AUTO: 22.26 THOUSAND/UL (ref 4.31–10.16)

## 2023-02-22 RX ORDER — GABAPENTIN 300 MG/1
300 CAPSULE ORAL 2 TIMES DAILY
Status: DISCONTINUED | OUTPATIENT
Start: 2023-02-22 | End: 2023-02-23 | Stop reason: HOSPADM

## 2023-02-22 RX ADMIN — OXYCODONE HYDROCHLORIDE 5 MG: 5 TABLET ORAL at 04:04

## 2023-02-22 RX ADMIN — METOPROLOL SUCCINATE 12.5 MG: 25 TABLET, EXTENDED RELEASE ORAL at 21:31

## 2023-02-22 RX ADMIN — HEPARIN SODIUM 5000 UNITS: 5000 INJECTION INTRAVENOUS; SUBCUTANEOUS at 06:40

## 2023-02-22 RX ADMIN — OXYCODONE HYDROCHLORIDE 5 MG: 5 TABLET ORAL at 13:56

## 2023-02-22 RX ADMIN — FAMOTIDINE 20 MG: 20 TABLET, FILM COATED ORAL at 21:31

## 2023-02-22 RX ADMIN — ACETAMINOPHEN 975 MG: 325 TABLET ORAL at 21:31

## 2023-02-22 RX ADMIN — ACETAMINOPHEN 975 MG: 325 TABLET ORAL at 06:40

## 2023-02-22 RX ADMIN — LEVALBUTEROL HYDROCHLORIDE 1.25 MG: 1.25 SOLUTION RESPIRATORY (INHALATION) at 13:14

## 2023-02-22 RX ADMIN — FORMOTEROL FUMARATE 20 MCG: 20 SOLUTION RESPIRATORY (INHALATION) at 20:01

## 2023-02-22 RX ADMIN — FORMOTEROL FUMARATE 20 MCG: 20 SOLUTION RESPIRATORY (INHALATION) at 07:38

## 2023-02-22 RX ADMIN — BUDESONIDE 0.5 MG: 0.5 INHALANT RESPIRATORY (INHALATION) at 07:07

## 2023-02-22 RX ADMIN — LEVALBUTEROL HYDROCHLORIDE 1.25 MG: 1.25 SOLUTION RESPIRATORY (INHALATION) at 20:01

## 2023-02-22 RX ADMIN — ASPIRIN 81 MG: 81 TABLET, COATED ORAL at 21:31

## 2023-02-22 RX ADMIN — METOPROLOL SUCCINATE 12.5 MG: 25 TABLET, EXTENDED RELEASE ORAL at 08:13

## 2023-02-22 RX ADMIN — INSULIN LISPRO 3 UNITS: 100 INJECTION, SOLUTION INTRAVENOUS; SUBCUTANEOUS at 12:03

## 2023-02-22 RX ADMIN — ATORVASTATIN CALCIUM 80 MG: 40 TABLET, FILM COATED ORAL at 17:13

## 2023-02-22 RX ADMIN — LEVALBUTEROL HYDROCHLORIDE 1.25 MG: 1.25 SOLUTION RESPIRATORY (INHALATION) at 07:07

## 2023-02-22 RX ADMIN — PREDNISONE 20 MG: 20 TABLET ORAL at 08:13

## 2023-02-22 RX ADMIN — HEPARIN SODIUM 5000 UNITS: 5000 INJECTION INTRAVENOUS; SUBCUTANEOUS at 13:34

## 2023-02-22 RX ADMIN — UMECLIDINIUM 1 PUFF: 62.5 AEROSOL, POWDER ORAL at 08:14

## 2023-02-22 RX ADMIN — BUDESONIDE 0.5 MG: 0.5 INHALANT RESPIRATORY (INHALATION) at 20:01

## 2023-02-22 RX ADMIN — FLUTICASONE PROPIONATE 1 SPRAY: 50 SPRAY, METERED NASAL at 08:14

## 2023-02-22 RX ADMIN — HEPARIN SODIUM 5000 UNITS: 5000 INJECTION INTRAVENOUS; SUBCUTANEOUS at 21:32

## 2023-02-22 RX ADMIN — AZITHROMYCIN MONOHYDRATE 500 MG: 250 TABLET ORAL at 08:13

## 2023-02-22 RX ADMIN — ACETAMINOPHEN 975 MG: 325 TABLET ORAL at 13:34

## 2023-02-22 RX ADMIN — GABAPENTIN 600 MG: 300 CAPSULE ORAL at 08:13

## 2023-02-22 RX ADMIN — FUROSEMIDE 40 MG: 40 TABLET ORAL at 08:14

## 2023-02-22 NOTE — NURSING NOTE
Dx Rt pneumothorax,AAOx4,On O2 5L NC,w/ rt chest tube to water seal Site clean,intact,no leaking noted; With episode of V tach (runs of 7 unifocal PVCs)  ,asymptomatic  VSS

## 2023-02-22 NOTE — ASSESSMENT & PLAN NOTE
• Possibly in the setting of recent peripheral nerve block for chronic back pain  • S/p R chest tube  • Maintain CT to -20 wall suction  • Serial chest x-rays  • Continue scheduled tylenol for pain control with PRN oxy and dilaudid   • General Surgery and Pulmonology following for chest tube management

## 2023-02-22 NOTE — ASSESSMENT & PLAN NOTE
Lab Results   Component Value Date    EGFR 66 02/22/2023    EGFR 62 02/21/2023    EGFR 52 02/20/2023    CREATININE 1 07 02/22/2023    CREATININE 1 12 02/21/2023    CREATININE 1 29 02/20/2023   · Creatinine appears to be at baseline  · Trend BMP  · Avoid nephrotoxic agents

## 2023-02-22 NOTE — PLAN OF CARE
Problem: Potential for Falls  Goal: Patient will remain free of falls  Description: INTERVENTIONS:  - Educate patient/family on patient safety including physical limitations  - Instruct patient to call for assistance with activity   - Consult OT/PT to assist with strengthening/mobility   - Keep Call bell within reach  - Keep bed low and locked with side rails adjusted as appropriate  - Keep care items and personal belongings within reach  - Initiate and maintain comfort rounds  - Make Fall Risk Sign visible to staff  - Offer Toileting every 2 Hours, in advance of need  - Initiate/Maintain bed alarm  - Apply yellow socks and bracelet for high fall risk patients  - Consider moving patient to room near nurses station  Outcome: Progressing     Problem: MOBILITY - ADULT  Goal: Maintain or return to baseline ADL function  Description: INTERVENTIONS:  -  Assess patient's ability to carry out ADLs; assess patient's baseline for ADL function and identify physical deficits which impact ability to perform ADLs (bathing, care of mouth/teeth, toileting, grooming, dressing, etc )  - Assess/evaluate cause of self-care deficits   - Assess range of motion  - Assess patient's mobility; develop plan if impaired  - Assess patient's need for assistive devices and provide as appropriate  - Encourage maximum independence but intervene and supervise when necessary  - Involve family in performance of ADLs  - Assess for home care needs following discharge   - Consider OT consult to assist with ADL evaluation and planning for discharge  - Provide patient education as appropriate  Outcome: Progressing  Goal: Maintains/Returns to pre admission functional level  Description: INTERVENTIONS:  - Perform BMAT or MOVE assessment daily    - Set and communicate daily mobility goal to care team and patient/family/caregiver  - Collaborate with rehabilitation services on mobility goals if consulted  - Perform Range of Motion 2 times a day    - Reposition patient every 2 hours    - Dangle patient 2 times a day  - Stand patient 2 times a day  - Ambulate patient 2 times a day  - Out of bed for meals   - Out of bed for toileting  - Record patient progress and toleration of activity level   Outcome: Progressing     Problem: CARDIOVASCULAR - ADULT  Goal: Maintains optimal cardiac output and hemodynamic stability  Description: INTERVENTIONS:  - Monitor I/O, vital signs and rhythm  - Monitor for S/S and trends of decreased cardiac output  - Administer and titrate ordered vasoactive medications to optimize hemodynamic stability  - Assess quality of pulses, skin color and temperature  - Assess for signs of decreased coronary artery perfusion  - Instruct patient to report change in severity of symptoms  Outcome: Progressing  Goal: Absence of cardiac dysrhythmias or at baseline rhythm  Description: INTERVENTIONS:  - Continuous cardiac monitoring, vital signs, obtain 12 lead EKG if ordered  - Administer antiarrhythmic and heart rate control medications as ordered  - Monitor electrolytes and administer replacement therapy as ordered  Outcome: Progressing     Problem: RESPIRATORY - ADULT  Goal: Achieves optimal ventilation and oxygenation  Description: INTERVENTIONS:  - Assess for changes in respiratory status  - Assess for changes in mentation and behavior  - Position to facilitate oxygenation and minimize respiratory effort  - Oxygen administered by appropriate delivery if ordered  - Initiate smoking cessation education as indicated  - Encourage broncho-pulmonary hygiene including cough, deep breathe, Incentive Spirometry  - Assess the need for suctioning and aspirate as needed  - Assess and instruct to report SOB or any respiratory difficulty  - Respiratory Therapy support as indicated  Outcome: Progressing     Problem: METABOLIC, FLUID AND ELECTROLYTES - ADULT  Goal: Electrolytes maintained within normal limits  Description: INTERVENTIONS:  - Monitor labs and assess patient for signs and symptoms of electrolyte imbalances  - Administer electrolyte replacement as ordered  - Monitor response to electrolyte replacements, including repeat lab results as appropriate  - Instruct patient on fluid and nutrition as appropriate  Outcome: Progressing  Goal: Fluid balance maintained  Description: INTERVENTIONS:  - Monitor labs   - Monitor I/O and WT  - Instruct patient on fluid and nutrition as appropriate  - Assess for signs & symptoms of volume excess or deficit  Outcome: Progressing  Goal: Glucose maintained within target range  Description: INTERVENTIONS:  - Monitor Blood Glucose as ordered  - Assess for signs and symptoms of hyperglycemia and hypoglycemia  - Administer ordered medications to maintain glucose within target range  - Assess nutritional intake and initiate nutrition service referral as needed  Outcome: Progressing     Problem: Prexisting or High Potential for Compromised Skin Integrity  Goal: Skin integrity is maintained or improved  Description: INTERVENTIONS:  - Identify patients at risk for skin breakdown  - Assess and monitor skin integrity  - Assess and monitor nutrition and hydration status  - Monitor labs   - Assess for incontinence   - Turn and reposition patient  - Assist with mobility/ambulation  - Relieve pressure over bony prominences  - Avoid friction and shearing  - Provide appropriate hygiene as needed including keeping skin clean and dry  - Evaluate need for skin moisturizer/barrier cream  - Collaborate with interdisciplinary team   - Patient/family teaching  - Consider wound care consult   Outcome: Progressing     Problem: PAIN - ADULT  Goal: Verbalizes/displays adequate comfort level or baseline comfort level  Description: Interventions:  - Encourage patient to monitor pain and request assistance  - Assess pain using appropriate pain scale  - Administer analgesics based on type and severity of pain and evaluate response  - Implement non-pharmacological measures as appropriate and evaluate response  - Consider cultural and social influences on pain and pain management  - Notify physician/advanced practitioner if interventions unsuccessful or patient reports new pain  Outcome: Progressing     Problem: INFECTION - ADULT  Goal: Absence or prevention of progression during hospitalization  Description: INTERVENTIONS:  - Assess and monitor for signs and symptoms of infection  - Monitor lab/diagnostic results  - Monitor all insertion sites, i e  indwelling lines, tubes, and drains  - Monitor endotracheal if appropriate and nasal secretions for changes in amount and color  - Jackson appropriate cooling/warming therapies per order  - Administer medications as ordered  - Instruct and encourage patient and family to use good hand hygiene technique  - Identify and instruct in appropriate isolation precautions for identified infection/condition  Outcome: Progressing     Problem: SAFETY ADULT  Goal: Patient will remain free of falls  Description: INTERVENTIONS:  - Educate patient/family on patient safety including physical limitations  - Instruct patient to call for assistance with activity   - Consult OT/PT to assist with strengthening/mobility   - Keep Call bell within reach  - Keep bed low and locked with side rails adjusted as appropriate  - Keep care items and personal belongings within reach  - Initiate and maintain comfort rounds  - Make Fall Risk Sign visible to staff  - Offer Toileting every 2 Hours, in advance of need  - Initiate/Maintain bed alarm  - Apply yellow socks and bracelet for high fall risk patients  - Consider moving patient to room near nurses station  Outcome: Progressing  Goal: Maintain or return to baseline ADL function  Description: INTERVENTIONS:  -  Assess patient's ability to carry out ADLs; assess patient's baseline for ADL function and identify physical deficits which impact ability to perform ADLs (bathing, care of mouth/teeth, toileting, grooming, dressing, etc )  - Assess/evaluate cause of self-care deficits   - Assess range of motion  - Assess patient's mobility; develop plan if impaired  - Assess patient's need for assistive devices and provide as appropriate  - Encourage maximum independence but intervene and supervise when necessary  - Involve family in performance of ADLs  - Assess for home care needs following discharge   - Consider OT consult to assist with ADL evaluation and planning for discharge  - Provide patient education as appropriate  Outcome: Progressing

## 2023-02-22 NOTE — PLAN OF CARE
Problem: CARDIOVASCULAR - ADULT  Goal: Maintains optimal cardiac output and hemodynamic stability  Description: INTERVENTIONS:  - Monitor I/O, vital signs and rhythm  - Monitor for S/S and trends of decreased cardiac output  - Administer and titrate ordered vasoactive medications to optimize hemodynamic stability  - Assess quality of pulses, skin color and temperature  - Assess for signs of decreased coronary artery perfusion  - Instruct patient to report change in severity of symptoms  Outcome: Progressing     Problem: CARDIOVASCULAR - ADULT  Goal: Absence of cardiac dysrhythmias or at baseline rhythm  Description: INTERVENTIONS:  - Continuous cardiac monitoring, vital signs, obtain 12 lead EKG if ordered  - Administer antiarrhythmic and heart rate control medications as ordered  - Monitor electrolytes and administer replacement therapy as ordered  Outcome: Progressing     Problem: RESPIRATORY - ADULT  Goal: Achieves optimal ventilation and oxygenation  Description: INTERVENTIONS:  - Assess for changes in respiratory status  - Assess for changes in mentation and behavior  - Position to facilitate oxygenation and minimize respiratory effort  - Oxygen administered by appropriate delivery if ordered  - Initiate smoking cessation education as indicated  - Encourage broncho-pulmonary hygiene including cough, deep breathe, Incentive Spirometry  - Assess the need for suctioning and aspirate as needed  - Assess and instruct to report SOB or any respiratory difficulty  - Respiratory Therapy support as indicated  Outcome: Progressing     Problem: PAIN - ADULT  Goal: Verbalizes/displays adequate comfort level or baseline comfort level  Description: Interventions:  - Encourage patient to monitor pain and request assistance  - Assess pain using appropriate pain scale  - Administer analgesics based on type and severity of pain and evaluate response  - Implement non-pharmacological measures as appropriate and evaluate response  - Consider cultural and social influences on pain and pain management  - Notify physician/advanced practitioner if interventions unsuccessful or patient reports new pain  Outcome: Progressing     Problem: INFECTION - ADULT  Goal: Absence or prevention of progression during hospitalization  Description: INTERVENTIONS:  - Assess and monitor for signs and symptoms of infection  - Monitor lab/diagnostic results  - Monitor all insertion sites, i e  indwelling lines, tubes, and drains  - Monitor endotracheal if appropriate and nasal secretions for changes in amount and color  - [place] appropriate cooling/warming therapies per order  - Administer medications as ordered  - Instruct and encourage patient and family to use good hand hygiene technique  - Identify and instruct in appropriate isolation precautions for identified infection/condition  Outcome: Progressing

## 2023-02-22 NOTE — TELEPHONE ENCOUNTER
Patient requesting refill(s) of: Metoprolol succinate 25 mg BID    Last filled: 1/19/2023 #30 x 0   Last appt: 1/25/2023  Next appt: none  Pharmacy: [facility]  Requesting 90 day supply    Also states his Pulmicort inhaler is going to run out, but [facility] will not send before 3/2 due to insurance

## 2023-02-22 NOTE — PROGRESS NOTES
Martina U  65   Progress Note - Eleanora Police 1945, 65 y o  male MRN: [id number]  Unit/Bed#: ICU 02-01 Encounter: [id number]  Primary Care Provider: Bryon Burleson DO   Date and time admitted to hospital: 2/20/2023  9:14 PM    * Pneumothorax on right  Assessment & Plan  • Possibly in the setting of recent peripheral nerve block for chronic back pain  • S/p R chest tube  • Maintain CT to -20 wall suction  • Serial chest x-rays  • Continue scheduled tylenol for pain control with PRN oxy and dilaudid   • General Surgery and Pulmonology following for chest tube management    Leukocytosis  Assessment & Plan  • Likely reactive, no obvious source for infection  • CXR without obvious consolidation  • Abdominal exam benign  • Will send UA for completeness  • Monitor off of abx  • Monitor WBC and fever curve     COPD (chronic obstructive pulmonary disease) (HCC)  Assessment & Plan  • With recent acute exacerbation   • Will continue OP prednisone taper  • Continue TID azithromycin  • Continue scheduled budesonide and xopenex  • Continue home spiriva     Acute on chronic respiratory failure with hypoxia (HCC)  Assessment & Plan  • Likely in the setting of R pneumothorax with baseline COPD  • At baseline, requires 4L NC   • Continue supplemental O2 via nasal cannula, titrate for SpO2>88   • S/p R chest tube placement   • Encourage cough, deep breathing, IS, ambulation with assistance    Stage 3a chronic kidney disease [facility])  Assessment & Plan  Lab Results   Component Value Date    EGFR 66 02/22/2023    EGFR 62 02/21/2023    EGFR 52 02/20/2023    CREATININE 1 07 02/22/2023    CREATININE 1 12 02/21/2023    CREATININE 1 29 02/20/2023   · Creatinine appears to be at baseline  · Trend BMP  · Avoid nephrotoxic agents    LAMBERTO on CPAP  Assessment & Plan  • Will hold on CPAP given pneumothorax  • Continue supplemental O2    Prediabetes  Assessment & Plan  · Hemoglobin A1c 5 9%  · Sliding-scale insulin while inpatient    Gastroesophageal reflux disease  Assessment & Plan  · Continue home Pepcid    Chronic ischemic heart disease  Assessment & Plan  • Continue home ASA, BB, lasix       VTE Pharmacologic Prophylaxis: Heparin    Patient Centered Rounds:  Patient care rounds were performed with nursing    Discussions with Specialists or Other Care Team Provider: Pulmonology, General Surgery    Education and Discussions with Family / Patient: Spoke with patient at bedside    Time Spent for Care: 30  More than 50% of total time spent on counseling and coordination of care as described above  Current Length of Stay: 2 day(s)    Current Patient Status: Inpatient     Certification Statement: The patient will continue to require additional inpatient hospital stay due to pneumothorax secondary to nerve block status post chest tube placement    Discharge Plan: Home with family support once medically stable    Code Status: Level 1 - Full Code      Subjective:   Patient seen and examined at bedside  No acute events overnight  Denies chest pain, SOB, diaphoresis, nausea/vomiting/diarrhea, fevers/chills  Objective:     Vitals:   Temp (24hrs), Av 2 °F (36 2 °C), Min:96 3 °F (35 7 °C), Max:97 5 °F (36 4 °C)    Temp:  [96 3 °F (35 7 °C)-97 5 °F (36 4 °C)] 97 4 °F (36 3 °C)  HR:  [67-93] 86  Resp:  [15-31] 15  BP: (107-131)/(56-80) 124/60  SpO2:  [87 %-93 %] 90 %  Body mass index is 32 52 kg/m²  Input and Output Summary (last 24 hours): Intake/Output Summary (Last 24 hours) at 2023 0853  Last data filed at 2023 0601  Gross per 24 hour   Intake 740 ml   Output 3275 ml   Net -2535 ml       Physical Exam:     Physical Exam  Vitals and nursing note reviewed  Constitutional:       General: He is not in acute distress  Appearance: He is well-developed  HENT:      Head: Normocephalic and atraumatic     Eyes:      Conjunctiva/sclera: Conjunctivae normal    Cardiovascular:      Rate and Rhythm: Normal rate and regular rhythm  Heart sounds: No murmur heard  Pulmonary:      Effort: Pulmonary effort is normal  No respiratory distress  Breath sounds: Normal breath sounds  Abdominal:      Palpations: Abdomen is soft  Tenderness: There is no abdominal tenderness  Musculoskeletal:         General: No swelling  Cervical back: Neck supple  Skin:     General: Skin is warm and dry  Capillary Refill: Capillary refill takes less than 2 seconds  Neurological:      Mental Status: He is alert  Psychiatric:         Mood and Affect: Mood normal          Additional Data:     Labs:  I have reviewed pertinent results     Results from last 7 days   Lab Units 02/22/23 0443 02/21/23  0442 02/20/23  2123   WBC Thousand/uL 22 26* 24 89* 28 19*   HEMOGLOBIN g/dL 15 0 14 4 15 9   HEMATOCRIT % 46 9 45 6 50 7*   PLATELETS Thousands/uL 180 172 210   BANDS PCT %  --   --  3   NEUTROS PCT %  --  88*  --    LYMPHS PCT %  --  3*  --    LYMPHO PCT %  --   --  2*   MONOS PCT %  --  9  --    MONO PCT %  --   --  6   EOS PCT %  --  0 0     Results from last 7 days   Lab Units 02/22/23  0443   SODIUM mmol/L 140   POTASSIUM mmol/L 4 0   CHLORIDE mmol/L 99   CO2 mmol/L 40*   BUN mg/dL 23   CREATININE mg/dL 1 07   ANION GAP mmol/L 1*   CALCIUM mg/dL 9 0   GLUCOSE RANDOM mg/dL 117         Results from last 7 days   Lab Units 02/22/23  0646 02/21/23  2030 02/21/23  1551 02/21/23  1058 02/21/23  0726 02/21/23  0312   POC GLUCOSE mg/dl 117 149* 128 203* 144* 142*                 Imaging: I have reviewed pertinent imaging       Recent Cultures (last 7 days):           Last 24 Hours Medication List:   Current Facility-Administered Medications   Medication Dose Route Frequency Provider Last Rate   • acetaminophen  975 mg Oral [facility] INOCENTE Jerome     • aspirin  81 mg Oral HS INOCENTE Jerome     • atorvastatin  80 mg Oral Daily [employer] INOCENTE Man     • azithromycin  500 mg Oral Once per day on Mon Wed Fri Jesika INOCENTE Man     • benzonatate  200 mg Oral TID PRN INOCENTE Lucas     • budesonide  0 5 mg Nebulization Q12H INOCENTE Jerome     • dextromethorphan-guaiFENesin  10 mL Oral Q6H PRN INOCENTE Lucas     • famotidine  20 mg Oral HS INOCENTE Jerome     • fluticasone  1 spray Each Nare Daily INOCENTE Jerome     • formoterol  20 mcg Nebulization Q12H INOCENTE Jerome     • furosemide  40 mg Oral Daily INOCENTE Jerome     • gabapentin  600 mg Oral BID INOCENTE Jerome     • heparin (porcine)  5,000 Units Subcutaneous Q8H [facility] INOCENTE Jerome     • insulin lispro  1-6 Units Subcutaneous TID AC INOCENTE Jerome     • insulin lispro  1-6 Units Subcutaneous HS INOCENTE Jerome     • levalbuterol  1 25 mg Nebulization TID INOCENTE Jerome     • metoprolol succinate  12 5 mg Oral BID INOCENTE Jerome     • oxyCODONE  5 mg Oral Q6H PRN INOCENTE Lucas      Or   • oxyCODONE  10 mg Oral Q6H PRN INOCENTE Lucas     • predniSONE  20 mg Oral Daily INOCENTE Jerome      Followed by   • [START ON 2/25/2023] predniSONE  10 mg Oral Daily INOCENTE Jerome     • umeclidinium  1 puff Inhalation Daily INOCENTE Lucas          Today, Patient Was Seen By: Isak Chauhan DO    ** Please Note: Dictation voice to text software may have been used in the creation of this document   **

## 2023-02-22 NOTE — ASSESSMENT & PLAN NOTE
• Likely reactive, no obvious source for infection  • CXR without obvious consolidation  • Abdominal exam benign  • Will send UA for completeness  • Monitor off of abx  • Monitor WBC and fever curve

## 2023-02-22 NOTE — ASSESSMENT & PLAN NOTE
• Likely in the setting of R pneumothorax with baseline COPD  • At baseline, requires 4L NC   • Continue supplemental O2 via nasal cannula, titrate for SpO2>88   • S/p R chest tube placement   • Encourage cough, deep breathing, IS, ambulation with assistance

## 2023-02-22 NOTE — ASSESSMENT & PLAN NOTE
• With recent acute exacerbation   • Will continue OP prednisone taper  • Continue TID azithromycin  • Continue scheduled budesonide and xopenex  • Continue home spiriva

## 2023-02-22 NOTE — PROGRESS NOTES
Progress Note - Pulmonary   Orvil Hernando 65 y o  male MRN: [id number]  Unit/Bed#: ICU 02-01 Encounter: [id number]    Assessment  Iatrogenic pneumothorax in setting of R intercostal block 2/20   COPD in acute exacerbation- initiated treatment as outpatient  Acute on chronic hypoxemic respiratory failure  LAMBERTO on CPAP- CPAP on hold for now given pneumothorax  CAD  Ischemic cardiomyopathy with chronic systolic heart failure       Plan  FIO2 for SPO2 >88%  Chest x-ray this morning showed worsened subcutaenous emphysema  Was unclear to me if there was a small pneumothorax and the chest tube had no air leak so I ordered a Chest CT  Tube is in place in the fissure on the R  No pneumothorax  Significant subcutaneous emphysema  Plan will be to do a clamp trial of chest tube- given time now will do it tomorrow morning- will ask ICU BRYAN to clamp chest tube at around 5 AM and to check a chest x-ray at 9 AM   If no recurrence of pneumothorax will remove chest tube tomorrow  The chest tube appears to follow along the fissure on the CT rather than being intraparenchymal   There will be some risk of injuring the lung when removing the tube given the surrounding emphysema and how deep into the fissure the tube is  Continue home nebulizer treatments and prednisone taper  At home- brovana BID, Spiriva inhaled, perforomist    In hospital switched spiriva for Incruse  Xopenex/Atrovent TID    Discussed with Dr Tammy Eastman with [facility] via [employer]      Subjective:   Feels a bit better today  Some pain at chest tube site  Objective:     Vitals: Blood pressure 106/53, pulse 89, temperature (!) 97 4 °F (36 3 °C), temperature source Tympanic, resp  rate (!) 26, height 5' 8" (1 727 m), weight 97 kg (213 lb 13 5 oz), SpO2 91 %  ,Body mass index is 32 52 kg/m²        Intake/Output Summary (Last 24 hours) at 2/22/2023 1418  Last data filed at 2/22/2023 1400  Gross per 24 hour   Intake 1140 ml   Output 3325 ml   Net -2185 ml       Invasive Devices     Peripheral Intravenous Line  Duration           Peripheral IV 02/20/23 Right Antecubital 1 day          Drain  Duration           Chest Tube 1 Right Midaxillary 1 day                Vitals:    02/22/23 1400   BP:    Pulse: 89   Resp:    Temp:    SpO2: 91%     General:  Patient is awake, alert, non-toxic and in no acute respiratory distress  Eyes: PERRL, no scleral icterus  Neck: No JVD  CV:  Regular, +S1 and S2, No murmurs, gallops or rubs appreciated  Lungs: Clear to auscultation bilateral without wheeze, rales or rhonci  R chest tube in place on water seal   No air leak  Abdomen: Soft, +BS, Non-tender, non-distended  Extremities: No clubbing, cyanosis or edema  Neuro: No focal motor/sensory deficits  Skin: Warm, No rashes or ulcerations      Labs: I have personally reviewed pertinent lab results  Imaging and other studies: I have personally reviewed pertinent reports  and I have personally reviewed pertinent films in PACS    CXR today- unclear if there was a small R sided pneumothorax so I ordered a Chest CT  CT Chest shows no pneumothorax, significant R sided subcutaneous emphysema    Bilateral emphysema

## 2023-02-23 ENCOUNTER — APPOINTMENT (INPATIENT)
Dept: RADIOLOGY | Facility: HOSPITAL | Age: 78
End: 2023-02-23

## 2023-02-23 VITALS
TEMPERATURE: 97 F | SYSTOLIC BLOOD PRESSURE: 120 MMHG | HEART RATE: 89 BPM | BODY MASS INDEX: 32.48 KG/M2 | DIASTOLIC BLOOD PRESSURE: 58 MMHG | RESPIRATION RATE: 18 BRPM | OXYGEN SATURATION: 92 % | HEIGHT: 68 IN | WEIGHT: 214.29 LBS

## 2023-02-23 LAB
ANION GAP SERPL CALCULATED.3IONS-SCNC: 2 MMOL/L (ref 4–13)
BASOPHILS # BLD AUTO: 0.03 THOUSANDS/ÂΜL (ref 0–0.1)
BASOPHILS NFR BLD AUTO: 0 % (ref 0–1)
BUN SERPL-MCNC: 24 MG/DL (ref 5–25)
CALCIUM SERPL-MCNC: 8.7 MG/DL (ref 8.4–10.2)
CHLORIDE SERPL-SCNC: 98 MMOL/L (ref 96–108)
CO2 SERPL-SCNC: 39 MMOL/L (ref 21–32)
CREAT SERPL-MCNC: 0.99 MG/DL (ref 0.6–1.3)
EOSINOPHIL # BLD AUTO: 0 THOUSAND/ÂΜL (ref 0–0.61)
EOSINOPHIL NFR BLD AUTO: 0 % (ref 0–6)
ERYTHROCYTE [DISTWIDTH] IN BLOOD BY AUTOMATED COUNT: 13.6 % (ref 11.6–15.1)
GFR SERPL CREATININE-BSD FRML MDRD: 72 ML/MIN/1.73SQ M
GLUCOSE SERPL-MCNC: 121 MG/DL (ref 65–140)
GLUCOSE SERPL-MCNC: 125 MG/DL (ref 65–140)
GLUCOSE SERPL-MCNC: 152 MG/DL (ref 65–140)
HCT VFR BLD AUTO: 46.7 % (ref 36.5–49.3)
HGB BLD-MCNC: 14.7 G/DL (ref 12–17)
IMM GRANULOCYTES # BLD AUTO: 0.17 THOUSAND/UL (ref 0–0.2)
IMM GRANULOCYTES NFR BLD AUTO: 1 % (ref 0–2)
LYMPHOCYTES # BLD AUTO: 0.54 THOUSANDS/ÂΜL (ref 0.6–4.47)
LYMPHOCYTES NFR BLD AUTO: 3 % (ref 14–44)
MAGNESIUM SERPL-MCNC: 2.3 MG/DL (ref 1.9–2.7)
MCH RBC QN AUTO: 32.5 PG (ref 26.8–34.3)
MCHC RBC AUTO-ENTMCNC: 31.5 G/DL (ref 31.4–37.4)
MCV RBC AUTO: 103 FL (ref 82–98)
MONOCYTES # BLD AUTO: 1.74 THOUSAND/ÂΜL (ref 0.17–1.22)
MONOCYTES NFR BLD AUTO: 9 % (ref 4–12)
NEUTROPHILS # BLD AUTO: 17.81 THOUSANDS/ÂΜL (ref 1.85–7.62)
NEUTS SEG NFR BLD AUTO: 87 % (ref 43–75)
NRBC BLD AUTO-RTO: 0 /100 WBCS
PHOSPHATE SERPL-MCNC: 4 MG/DL (ref 2.3–4.1)
PLATELET # BLD AUTO: 190 THOUSANDS/UL (ref 149–390)
PMV BLD AUTO: 11.3 FL (ref 8.9–12.7)
POTASSIUM SERPL-SCNC: 4.1 MMOL/L (ref 3.5–5.3)
RBC # BLD AUTO: 4.53 MILLION/UL (ref 3.88–5.62)
SODIUM SERPL-SCNC: 139 MMOL/L (ref 135–147)
WBC # BLD AUTO: 20.29 THOUSAND/UL (ref 4.31–10.16)

## 2023-02-23 RX ORDER — PREDNISONE 20 MG/1
20 TABLET ORAL DAILY
Qty: 1 TABLET | Refills: 0 | Status: SHIPPED | OUTPATIENT
Start: 2023-02-24 | End: 2023-02-25

## 2023-02-23 RX ORDER — PREDNISONE 10 MG/1
10 TABLET ORAL DAILY
Qty: 3 TABLET | Refills: 0 | Status: SHIPPED | OUTPATIENT
Start: 2023-02-25 | End: 2023-02-28

## 2023-02-23 RX ORDER — METOPROLOL SUCCINATE 25 MG/1
12.5 TABLET, EXTENDED RELEASE ORAL 2 TIMES DAILY
Qty: 90 TABLET | Refills: 0 | Status: SHIPPED | OUTPATIENT
Start: 2023-02-23

## 2023-02-23 RX ORDER — BUDESONIDE 0.5 MG/2ML
0.5 INHALANT ORAL
Qty: 120 ML | Refills: 0 | Status: SHIPPED | OUTPATIENT
Start: 2023-02-23

## 2023-02-23 RX ORDER — GABAPENTIN 300 MG/1
300 CAPSULE ORAL 2 TIMES DAILY
Start: 2023-02-23

## 2023-02-23 RX ADMIN — GABAPENTIN 300 MG: 300 CAPSULE ORAL at 08:19

## 2023-02-23 RX ADMIN — LEVALBUTEROL HYDROCHLORIDE 1.25 MG: 1.25 SOLUTION RESPIRATORY (INHALATION) at 07:10

## 2023-02-23 RX ADMIN — FUROSEMIDE 40 MG: 40 TABLET ORAL at 08:18

## 2023-02-23 RX ADMIN — FLUTICASONE PROPIONATE 1 SPRAY: 50 SPRAY, METERED NASAL at 08:18

## 2023-02-23 RX ADMIN — HEPARIN SODIUM 5000 UNITS: 5000 INJECTION INTRAVENOUS; SUBCUTANEOUS at 05:01

## 2023-02-23 RX ADMIN — ACETAMINOPHEN 975 MG: 325 TABLET ORAL at 05:01

## 2023-02-23 RX ADMIN — METOPROLOL SUCCINATE 12.5 MG: 25 TABLET, EXTENDED RELEASE ORAL at 08:17

## 2023-02-23 RX ADMIN — HEPARIN SODIUM 5000 UNITS: 5000 INJECTION INTRAVENOUS; SUBCUTANEOUS at 14:52

## 2023-02-23 RX ADMIN — LEVALBUTEROL HYDROCHLORIDE 1.25 MG: 1.25 SOLUTION RESPIRATORY (INHALATION) at 13:00

## 2023-02-23 RX ADMIN — UMECLIDINIUM 1 PUFF: 62.5 AEROSOL, POWDER ORAL at 08:18

## 2023-02-23 RX ADMIN — INSULIN LISPRO 1 UNITS: 100 INJECTION, SOLUTION INTRAVENOUS; SUBCUTANEOUS at 12:13

## 2023-02-23 RX ADMIN — ACETAMINOPHEN 975 MG: 325 TABLET ORAL at 14:51

## 2023-02-23 RX ADMIN — BUDESONIDE 0.5 MG: 0.5 INHALANT RESPIRATORY (INHALATION) at 07:10

## 2023-02-23 RX ADMIN — PREDNISONE 20 MG: 20 TABLET ORAL at 08:18

## 2023-02-23 RX ADMIN — FORMOTEROL FUMARATE 20 MCG: 20 SOLUTION RESPIRATORY (INHALATION) at 07:31

## 2023-02-23 NOTE — PROGRESS NOTES
Progress Note - Pulmonary   Jean Alva 65 y o  male MRN: [id number]  Unit/Bed#: ICU 02-01 Encounter: [id number]    Assessment  Iatrogenic pneumothorax in setting of R intercostal block 2/20   COPD in acute exacerbation- initiated treatment as outpatient  Acute on chronic hypoxemic respiratory failure  LAMBERTO on CPAP- CPAP on hold for now given pneumothorax  CAD  Ischemic cardiomyopathy with chronic systolic heart failure  5  Mm lung nodule- incidental finding       Plan  FIO2 for SPO2 >88%  R chest tube clamped at 5 AM   CXR at 10 AM no appreciable pneumothorax  Chest tube had not had air leak x 2 days  I removed chest tube at 11:10 AM without event, dressing placed  Check chest xray at 2 PM today    Continue home nebulizer treatments and prednisone taper  At home- brovana BID, Spiriva inhaled, perforomist    In hospital switched spiriva for Incruse  Xopenex/Atrovent TID    5 mm lung nodule- incidentally found on CT  Requires 6 month follow-up  He follows with pulmonary office  Placed info in discharge pathway of [facility] under incidental finding and informed the patient  **I left 1 suture in place at chest tube site as the hole was larger than tube  I informed the patient and Dr Aretha Kaye with [facility] that this needs to be removed in around 1 week  Subjective:   No complaints today  R chest tube clamped at 5 AM   CXR at 10 AM no appreciable pneumothorax  Chest tube had not had air leak x 2 days  I removed chest tube at 11:10 AM without event, dressing placed  Objective:     Vitals: Blood pressure 120/58, pulse 89, temperature (!) 97 °F (36 1 °C), temperature source Tympanic, resp  rate 18, height 5' 8" (1 727 m), weight 97 2 kg (214 lb 4 6 oz), SpO2 93 %  ,Body mass index is 32 58 kg/m²        Intake/Output Summary (Last 24 hours) at 2/23/2023 1123  Last data filed at 2/23/2023 1000  Gross per 24 hour   Intake 1440 ml   Output 4500 ml   Net -3060 ml       Invasive Devices     Peripheral Intravenous Line  Duration           Peripheral IV 02/20/23 Right Antecubital 2 days          Drain  Duration           Chest Tube 1 Right Midaxillary 2 days                Vitals:    02/23/23 0817   BP: 120/58   Pulse: 89   Resp:    Temp:    SpO2:      General:  Patient is awake, alert, non-toxic and in no acute respiratory distress  Eyes: PERRL, no scleral icterus  Neck: No JVD  CV:  Regular, +S1 and S2, No murmurs, gallops or rubs appreciated  Lungs: Clear to auscultation bilateral without wheeze, rales or rhonci  R chest subcutaneous emphysema  Abdomen: Soft, +BS, Non-tender, non-distended  Extremities: No clubbing, cyanosis or edema  Neuro: No focal motor/sensory deficits  Skin: Warm, No rashes or ulcerations      Labs: I have personally reviewed pertinent lab results  Imaging and other studies: I have personally reviewed pertinent reports     and I have personally reviewed pertinent films in PACS

## 2023-02-23 NOTE — ASSESSMENT & PLAN NOTE
• Possibly in the setting of recent peripheral nerve block for chronic back pain  • S/p R chest tube  • Chest tube has been clamped ; plan for CXR to access pneumothorax  • Continue scheduled tylenol for pain control with PRN oxy and dilaudid   • General Surgery and Pulmonology following for chest tube management

## 2023-02-23 NOTE — ASSESSMENT & PLAN NOTE
Lab Results   Component Value Date    EGFR 72 02/23/2023    EGFR 66 02/22/2023    EGFR 62 02/21/2023    CREATININE 0 99 02/23/2023    CREATININE 1 07 02/22/2023    CREATININE 1 12 02/21/2023   · Creatinine appears to be at baseline  · Trend BMP  · Avoid nephrotoxic agents

## 2023-02-23 NOTE — INCIDENTAL FINDINGS
The following findings require follow up:  Radiographic finding   Findin mm lung nodule   Follow up required: CT scan 3-6 months   Follow up should be done within  month(s)    Please notify the following clinician to assist with the follow up:   Dr Jaz Gutierrez

## 2023-02-23 NOTE — DISCHARGE SUMMARY
[address]ine [address]  Discharge- Gilberto Walters 1945, 65 y o  male MRN: [id number]  Unit/Bed#: ICU 02-01 Encounter: [id number]  Primary Care Provider: Burton Henry DO   Date and time admitted to hospital: 2/20/2023  9:14 PM    * Pneumothorax on right  Assessment & Plan  • Possibly in the setting of recent peripheral nerve block for chronic back pain  • S/p R chest tube  • Chest tube has been clamped ; plan for CXR to access pneumothorax  • Continue scheduled tylenol for pain control with PRN oxy and dilaudid   • General Surgery and Pulmonology following for chest tube management    Leukocytosis  Assessment & Plan  • Likely reactive, no obvious source for infection  • CXR without obvious consolidation  • Abdominal exam benign  • Will send UA for completeness  • Monitor off of abx  • Monitor WBC and fever curve     COPD (chronic obstructive pulmonary disease) (HCC)  Assessment & Plan  • With recent acute exacerbation   • Will continue OP prednisone taper  • Continue TID azithromycin  • Continue scheduled budesonide and xopenex  • Continue home spiriva     Acute on chronic respiratory failure with hypoxia (HCC)  Assessment & Plan  • Likely in the setting of R pneumothorax with baseline COPD  • At baseline, requires 4L NC   • Continue supplemental O2 via nasal cannula, titrate for SpO2>88   • S/p R chest tube placement   • Encourage cough, deep breathing, IS, ambulation with assistance    Stage 3a chronic kidney disease [facility])  Assessment & Plan  Lab Results   Component Value Date    EGFR 72 02/23/2023    EGFR 66 02/22/2023    EGFR 62 02/21/2023    CREATININE 0 99 02/23/2023    CREATININE 1 07 02/22/2023    CREATININE 1 12 02/21/2023   · Creatinine appears to be at baseline  · Trend BMP  · Avoid nephrotoxic agents    LAMBERTO on CPAP  Assessment & Plan  • Will hold on CPAP given pneumothorax  • Continue supplemental O2    Prediabetes  Assessment & Plan  · Hemoglobin A1c 5 9%  · Sliding-scale insulin while inpatient    Gastroesophageal reflux disease  Assessment & Plan  · Continue home Pepcid    Chronic ischemic heart disease  Assessment & Plan  • Continue home ASA, BB, lasix       Discharging Physician / Practitioner: Isha Blevins DO  PCP: Angel Yip DO  Admission Date:   Admission Orders (From admission, onward)     Ordered        02/20/23 2301  INPATIENT ADMISSION  Once                      Discharge Date: 02/23/23    Medical Problems     Resolved Problems  Date Reviewed: 2/23/2023   None         Consultations During Hospital Stay: Pulmonology    Procedures Performed: Chest tube placement    Significant Findings / Test Results:     XR chest portable    Result Date: 2/23/2023  Impression: No pneumothorax  Workstation performed: [id number]     XR chest portable    Result Date: 2/21/2023  Impression: Right chest tube tip overlies the mid to lower lung zone, stable  No definite pneumothorax  Patchy bibasilar atelectasis  Workstation performed: [id number]     XR chest portable    Result Date: 2/21/2023  Impression: Right chest tube again noted  Trace right apical and lateral pneumothorax, not definitely seen on most recent prior exam but still improved from the initial chest x-rays  Patchy bibasilar atelectasis, stable  Workstation performed: [id number]     XR chest 1 view portable    Result Date: 2/21/2023  Impression: Interval placement of right chest tube with resolution of right pneumothorax  Workstation performed: [id number]     XR chest 1 view portable    Result Date: 2/21/2023  Impression: Small moderate right pneumothorax  No evidence of tension  Workstation performed: [id number]     CT chest wo contrast    Result Date: 2/23/2023  Impression: 1  Minimal residual right pneumothorax with chest tube in place  Right anterior and lateral chest wall subcutaneous emphysema  2   Bilateral lower lobe dense consolidation, which may represent atelectasis or pneumonia   3  Left lower lobe solid nodule measuring 5 mm, new since 2/28/2022  Based on current Fleischner Society 2017 Guidelines on incidental pulmonary nodule, patients with a known malignancy are at increased risk of metastasis and should receive initial three month follow-up chest CT  The study was marked in [facility] for immediate notification  Workstation performed: [id number]     XR chest portable ICU    Result Date: 2/23/2023  Impression: No pneumothorax  Patchy basilar opacities slightly improved  Workstation performed: [id number]     XR chest portable ICU    Result Date: 2/21/2023  Impression: Stable positioning of the right chest tube at the mid to lower lung zone  No definite pneumothorax  Workstation performed: [id number]       Incidental Findings: Not applicable    Test Results Pending at Discharge (will require follow up): Not applicable     Outpatient Tests Requested: Not applicable    Reason for Admission: Shortness of breath    Hospital Course:     Elli Geller is a 65 y o  male who presents with abrupt onset of R sided chest pain and shortness of breath  He has PMH CAD, ischemic heart disease, COPD, chronic respiratory failure on 4L O2 at baseline  Yesterday afternoon the patient underwent R peripheral intercostal nerve block  After he was discharged he began to experience severe R sided chest pain with associated shortness of breath  On arrival to the ED, he was noted to have R pneumothorax  CT was placed with resolution of ptx  The initial plan was to admit the patient to the internal medicine team, however the patient acutely decompensated with increased work of breathing  On my exam, I noted that the chest tube had become disconnected from the atrium  It was reconnected and the patient was placed on vapotherm for increased work of breathing  Repeat CXR verified that the pneumothorax was resolved and the patient improved  He will be admitted to [facility] for further monitoring and management       The patient was transitioned to the medical surgical floor and has remained hemodynamically stable and saturating well on baseline O2 requirement  Chest tube was removed and chest x-ray showed resolution of pneumothorax  The patient was thoroughly instructed to remove the bandage in 48 hours and to follow-up within 1 week to have the suture removed  He was strongly encouraged to resume all preadmission medications at all preadmission dosages  He was also encouraged to follow-up with his PCP within 7-14 days  Condition at Discharge: stable     Discharge Day Visit / Exam:     Subjective:  Patient seen and examined at bedside  No acute events overnight  Denies chest pain, SOB, diaphoresis, nausea/vomiting/diarrhea, fevers/chills  Vitals: Blood Pressure: 120/58 (02/23/23 0817)  Pulse: 89 (02/23/23 0817)  Temperature: (!) 97 °F (36 1 °C) (02/23/23 0700)  Temp Source: Tympanic (02/23/23 0700)  Respirations: 18 (02/23/23 0700)  Height: 5' 8" (172 7 cm) (02/21/23 0700)  Weight - Scale: 97 2 kg (214 lb 4 6 oz) (02/23/23 0600)  SpO2: 92 % (02/23/23 1300)     Exam:   Physical Exam  Vitals and nursing note reviewed  Constitutional:       General: He is not in acute distress  Appearance: He is well-developed  HENT:      Head: Normocephalic and atraumatic  Eyes:      Conjunctiva/sclera: Conjunctivae normal    Cardiovascular:      Rate and Rhythm: Normal rate and regular rhythm  Heart sounds: No murmur heard  Pulmonary:      Effort: Pulmonary effort is normal  No respiratory distress  Breath sounds: Normal breath sounds  Abdominal:      Palpations: Abdomen is soft  Tenderness: There is no abdominal tenderness  Musculoskeletal:         General: No swelling  Cervical back: Neck supple  Skin:     General: Skin is warm and dry  Capillary Refill: Capillary refill takes less than 2 seconds  Neurological:      Mental Status: He is alert     Psychiatric:         Mood and Affect: Mood normal            Discharge instructions/Information to patient and family:   See after visit summary for information provided to patient and family  Provisions for Follow-Up Care:  See after visit summary for information related to follow-up care and any pertinent home health orders  Disposition:     Home with family support  Outpatient follow-up with PCP  Resume preadmission medications     Discharge Statement:  I spent 60 minutes discharging the patient  This time was spent on the day of discharge  I had direct contact with the patient on the day of discharge  Greater than 50% of the total time was spent examining patient, answering all patient questions, arranging and discussing plan of care with patient as well as directly providing post-discharge instructions  Additional time then spent on discharge activities  Discharge Medications:  See after visit summary for reconciled discharge medications provided to patient and family        ** Please Note: This note has been constructed using a voice recognition system **

## 2023-02-23 NOTE — QUICK NOTE
R Chest tube clamped at 0500  Patient in no acute distress  Plan for CXR at 1000 to evaluate pneumothorax

## 2023-02-23 NOTE — PROGRESS NOTES
Sherry 44  Progress Note - Daryle Brasher 1945, 65 y o  male MRN: [id number]  Unit/Bed#: ICU 02-01 Encounter: [id number]  Primary Care Provider: Onel Roberson DO   Date and time admitted to hospital: 2/20/2023  9:14 PM    * Pneumothorax on right  Assessment & Plan  • Possibly in the setting of recent peripheral nerve block for chronic back pain  • S/p R chest tube  • Chest tube has been clamped ; plan for CXR to access pneumothorax  • Continue scheduled tylenol for pain control with PRN oxy and dilaudid   • General Surgery and Pulmonology following for chest tube management    Leukocytosis  Assessment & Plan  • Likely reactive, no obvious source for infection  • CXR without obvious consolidation  • Abdominal exam benign  • Will send UA for completeness  • Monitor off of abx  • Monitor WBC and fever curve     COPD (chronic obstructive pulmonary disease) (HCC)  Assessment & Plan  • With recent acute exacerbation   • Will continue OP prednisone taper  • Continue TID azithromycin  • Continue scheduled budesonide and xopenex  • Continue home spiriva     Acute on chronic respiratory failure with hypoxia ([facility])  Assessment & Plan  • Likely in the setting of R pneumothorax with baseline COPD  • At baseline, requires 4L NC   • Continue supplemental O2 via nasal cannula, titrate for SpO2>88   • S/p R chest tube placement   • Encourage cough, deep breathing, IS, ambulation with assistance    Stage 3a chronic kidney disease [facility])  Assessment & Plan  Lab Results   Component Value Date    EGFR 72 02/23/2023    EGFR 66 02/22/2023    EGFR 62 02/21/2023    CREATININE 0 99 02/23/2023    CREATININE 1 07 02/22/2023    CREATININE 1 12 02/21/2023   · Creatinine appears to be at baseline  · Trend BMP  · Avoid nephrotoxic agents    LAMBERTO on CPAP  Assessment & Plan  • Will hold on CPAP given pneumothorax  • Continue supplemental O2    Prediabetes  Assessment & Plan  · Hemoglobin A1c 5 9%  · Sliding-scale insulin while inpatient    Gastroesophageal reflux disease  Assessment & Plan  · Continue home Pepcid    Chronic ischemic heart disease  Assessment & Plan  • Continue home ASA, BB, lasix       VTE Pharmacologic Prophylaxis: Heparin    Patient Centered Rounds:  Patient care rounds were performed with nursing    Discussions with Specialists or Other Care Team Provider: Pulmonology    Education and Discussions with Family / Patient: Spoke with patient at bedside    Time Spent for Care: 30  More than 50% of total time spent on counseling and coordination of care as described above  Current Length of Stay: 3 day(s)    Current Patient Status: Inpatient     Certification Statement: The patient will continue to require additional inpatient hospital stay due to pneumothorax    Discharge Plan: Home with family support once chest tube removed    Code Status: Level 1 - Full Code      Subjective:   Patient seen and examined at bedside  No acute events overnight  Denies chest pain, SOB, diaphoresis, nausea/vomiting/diarrhea, fevers/chills  Chest tube clamped  Plan for CXR to access for pneumothorax  Objective:     Vitals:   Temp (24hrs), Av 8 °F (36 6 °C), Min:97 °F (36 1 °C), Max:98 2 °F (36 8 °C)    Temp:  [97 °F (36 1 °C)-98 2 °F (36 8 °C)] 97 °F (36 1 °C)  HR:  [69-90] 70  Resp:  [18-26] 18  BP: ()/(46-63) 120/58  SpO2:  [88 %-94 %] 93 %  Body mass index is 32 58 kg/m²  Input and Output Summary (last 24 hours): Intake/Output Summary (Last 24 hours) at 2023 0801  Last data filed at 2023 0501  Gross per 24 hour   Intake 1860 ml   Output 4525 ml   Net -2665 ml       Physical Exam:     Physical Exam  Vitals and nursing note reviewed  Constitutional:       General: He is not in acute distress  Appearance: He is well-developed  HENT:      Head: Normocephalic and atraumatic     Eyes:      Conjunctiva/sclera: Conjunctivae normal    Cardiovascular:      Rate and Rhythm: Normal rate and regular rhythm  Heart sounds: No murmur heard  Pulmonary:      Effort: Pulmonary effort is normal  No respiratory distress  Breath sounds: Normal breath sounds  Abdominal:      Palpations: Abdomen is soft  Tenderness: There is no abdominal tenderness  Musculoskeletal:         General: No swelling  Cervical back: Neck supple  Skin:     General: Skin is warm and dry  Capillary Refill: Capillary refill takes less than 2 seconds  Neurological:      Mental Status: He is alert  Psychiatric:         Mood and Affect: Mood normal          Additional Data:     Labs: I have reviewed pertinent results     Results from last 7 days   Lab Units 02/23/23  0517 02/21/23  0442 02/20/23  2123   WBC Thousand/uL 20 29*   < > 28 19*   HEMOGLOBIN g/dL 14 7   < > 15 9   HEMATOCRIT % 46 7   < > 50 7*   PLATELETS Thousands/uL 190   < > 210   BANDS PCT %  --   --  3   NEUTROS PCT % 87*   < >  --    LYMPHS PCT % 3*   < >  --    LYMPHO PCT %  --   --  2*   MONOS PCT % 9   < >  --    MONO PCT %  --   --  6   EOS PCT % 0   < > 0    < > = values in this interval not displayed       Results from last 7 days   Lab Units 02/23/23  0517   SODIUM mmol/L 139   POTASSIUM mmol/L 4 1   CHLORIDE mmol/L 98   CO2 mmol/L 39*   BUN mg/dL 24   CREATININE mg/dL 0 99   ANION GAP mmol/L 2*   CALCIUM mg/dL 8 7   GLUCOSE RANDOM mg/dL 125         Results from last 7 days   Lab Units 02/22/23  2117 02/22/23  1557 02/22/23  1057 02/22/23  0646 02/21/23  2030 02/21/23  1551 02/21/23  1058 02/21/23  0726 02/21/23  0312   POC GLUCOSE mg/dl 142* 138 237* 117 149* 128 203* 144* 142*                 Imaging: I have reviewed pertinent imaging       Recent Cultures (last 7 days):           Last 24 Hours Medication List:   Current Facility-Administered Medications   Medication Dose Route Frequency Provider Last Rate   • acetaminophen  975 mg Oral [facility] INOCENTE Jerome     • aspirin  81 mg Oral HS INOCENTE Jerome     • atorvastatin  80 mg Oral Daily With [place]isoko INOCENTE Man     • azithromycin  500 mg Oral Once per day on Mon Wed Fri INOCENTE Jerome     • benzonatate  200 mg Oral TID PRN INOCENTE Lamas     • budesonide  0 5 mg Nebulization Q12H INOCENTE Jerome     • dextromethorphan-guaiFENesin  10 mL Oral Q6H PRN INOCENTE Lamas     • famotidine  20 mg Oral HS INOCENTE Jerome     • fluticasone  1 spray Each Nare Daily INOCENTE Jerome     • formoterol  20 mcg Nebulization Q12H INOCENTE Jerome     • furosemide  40 mg Oral Daily INOCENTE Jerome     • gabapentin  300 mg Oral BID Martyn Lesch, DO     • heparin (porcine)  5,000 Units Subcutaneous [facility] INOCENTE Garcia     • insulin lispro  1-6 Units Subcutaneous TID AC INOCENTE Jerome     • insulin lispro  1-6 Units Subcutaneous HS INOCENTE Jerome     • levalbuterol  1 25 mg Nebulization TID INOCENTE Jerome     • metoprolol succinate  12 5 mg Oral BID INOCENTE Jerome     • oxyCODONE  5 mg Oral Q6H PRN INOCENTE Lamas      Or   • oxyCODONE  10 mg Oral Q6H PRN INOCENTE Lamas     • predniSONE  20 mg Oral Daily INOCENTE Jerome      Followed by   • [START ON 2/25/2023] predniSONE  10 mg Oral Daily INOCENTE Jeroem     • umeclidinium  1 puff Inhalation Daily INOCENTE Lamas          Today, Patient Was Seen By: Martyn Lesch, DO    ** Please Note: Dictation voice to text software may have been used in the creation of this document   **

## 2023-02-23 NOTE — TELEPHONE ENCOUNTER
Spoke with patient, he would like the Pulmicort sent to [facility] due to medication going to run out and the metoprolol sent to [facility] for 90 day supply

## 2023-02-24 ENCOUNTER — TRANSITIONAL CARE MANAGEMENT (OUTPATIENT)
Dept: FAMILY MEDICINE CLINIC | Facility: CLINIC | Age: 78
End: 2023-02-24

## 2023-02-24 NOTE — UTILIZATION REVIEW
NOTIFICATION OF ADMISSION DISCHARGE   This is a Notification of Discharge from [address]  Please be advised that this patient has been discharge from our facility  Below you will find the admission and discharge date and time including the patient’s disposition  UTILIZATION REVIEW CONTACT:  Donato Soto  Utilization   Network Utilization Review Department  Phone: [phone] carefully listen to the prompts  All voicemails are confidential   Email: [email or web address]     ADMISSION INFORMATION  PRESENTATION DATE: 2/20/2023  9:14 PM  OBERVATION ADMISSION DATE:   INPATIENT ADMISSION DATE: 2/20/23 11:01 PM   DISCHARGE DATE: 2/23/2023  4:05 PM   DISPOSITION:Home/Self Care    IMPORTANT INFORMATION:  Send all requests for admission clinical reviews, approved or denied determinations and any other requests to dedicated fax number below belonging to the campus where the patient is receiving treatment   List of dedicated fax numbers:  [address] DENIALS (Administrative/Medical Necessity) [phone]   [address] (Maternity/NICU/Pediatrics) [phone]   [facility] [phone]   [place] [address] [phone]   [address] [phone]   [address] [facility] [phone]   [address] [place] [address] [phone]   [address] [place] [address] [phone]   [facility]  [phone]   [address] [facility] [phone]   [address] [facility] [address] [facility] [phone]

## 2023-02-27 ENCOUNTER — TELEPHONE (OUTPATIENT)
Dept: PAIN MEDICINE | Facility: CLINIC | Age: 78
End: 2023-02-27

## 2023-02-27 NOTE — TELEPHONE ENCOUNTER
Pt reports 90% improvement post inj   Pain level 0/10    Patient would like someone to call him with directions on how to wean off the Gabapentin.

## 2023-02-28 ENCOUNTER — OFFICE VISIT (OUTPATIENT)
Dept: FAMILY MEDICINE CLINIC | Facility: CLINIC | Age: 78
End: 2023-02-28

## 2023-02-28 VITALS
HEIGHT: 68 IN | RESPIRATION RATE: 18 BRPM | OXYGEN SATURATION: 92 % | BODY MASS INDEX: 30.77 KG/M2 | WEIGHT: 203 LBS | DIASTOLIC BLOOD PRESSURE: 70 MMHG | SYSTOLIC BLOOD PRESSURE: 120 MMHG | HEART RATE: 101 BPM | TEMPERATURE: 98.3 F

## 2023-02-28 DIAGNOSIS — J44.9 CHRONIC OBSTRUCTIVE PULMONARY DISEASE, UNSPECIFIED COPD TYPE (HCC): ICD-10-CM

## 2023-02-28 DIAGNOSIS — J96.21 ACUTE ON CHRONIC RESPIRATORY FAILURE WITH HYPOXIA (HCC): ICD-10-CM

## 2023-02-28 DIAGNOSIS — R91.1 PULMONARY NODULE: ICD-10-CM

## 2023-02-28 DIAGNOSIS — J93.9 PNEUMOTHORAX ON RIGHT: Primary | ICD-10-CM

## 2023-02-28 NOTE — PROGRESS NOTES
Denver New 1945 male MRN: [id number]    [facility] Medicine Transition of Care Visit      SUBJECTIVE    CC: AGUDELO [place] Visit     Transitional Care Management Review:  Denver New is a 65 y o  male here for TCM follow up  Admitted 2/20-2/23 for pneumothorax   Hospital course as per discharge summary as below:    "Pneumothorax on right  Assessment & Plan  • Possibly in the setting of recent peripheral nerve block for chronic back pain  • S/p R chest tube  • Chest tube has been clamped ; plan for CXR to access pneumothorax  • Continue scheduled tylenol for pain control with PRN oxy and dilaudid   • General Surgery and Pulmonology following for chest tube management     Leukocytosis  Assessment & Plan  • Likely reactive, no obvious source for infection  • CXR without obvious consolidation  • Abdominal exam benign  • Will send UA for completeness  • Monitor off of abx  • Monitor WBC and fever curve      COPD (chronic obstructive pulmonary disease) ([facility] [address] )  Assessment & Plan  • With recent acute exacerbation   • Will continue OP prednisone taper  • Continue TID azithromycin  • Continue scheduled budesonide and xopenex  • Continue home spiriva      Acute on chronic respiratory failure with hypoxia (HCC)  Assessment & Plan  • Likely in the setting of R pneumothorax with baseline COPD  • At baseline, requires 4L NC   • Continue supplemental O2 via nasal cannula, titrate for SpO2>88   • S/p R chest tube placement   • Encourage cough, deep breathing, IS, ambulation with assistance     Stage 3a chronic kidney disease ([facility] [address] )  Assessment & Plan        • Lab Results   • Component • Value • Date   •   • EGFR • 72 • 02/23/2023   •   • EGFR • 66 • 02/22/2023   •   • EGFR • 62 • 02/21/2023   •   • CREATININE • 0 99 • 02/23/2023   •   • CREATININE • 1 07 • 02/22/2023   •   • CREATININE • 1 12 • 02/21/2023   • Creatinine appears to be at baseline  • Trend BMP  • Avoid nephrotoxic agents     LAMBERTO on CPAP  Assessment & Plan  • Will hold on CPAP given pneumothorax  • Continue supplemental O2     Prediabetes  Assessment & Plan  • Hemoglobin A1c 5 9%  • Sliding-scale insulin while inpatient     Gastroesophageal reflux disease  Assessment & Plan  • Continue home Pepcid     Chronic ischemic heart disease  Assessment & Plan  • Continue home ASA, BB, lasix "       "Giovanny nIgram a 65 y  o  male who presents with abrupt onset of R sided chest pain and shortness of breath  He has PMH CAD, ischemic heart disease, COPD, chronic respiratory failure on 4L O2 at baseline  Yesterday afternoon the patient underwent R peripheral intercostal nerve block  After he was discharged he began to experience severe R sided chest pain with associated shortness of breath  On arrival to the ED, he was noted to have R pneumothorax  CT was placed with resolution of ptx  The initial plan was to admit the patient to the internal medicine team, however the patient acutely decompensated with increased work of breathing  On my exam, I noted that the chest tube had become disconnected from the atrium  It was reconnected and the patient was placed on vapotherm for increased work of breathing  Repeat CXR verified that the pneumothorax was resolved and the patient improved  He will be admitted to [facility] for further monitoring and management       The patient was transitioned to the medical surgical floor and has remained hemodynamically stable and saturating well on baseline O2 requirement  Chest tube was removed and chest x-ray showed resolution of pneumothorax  The patient was thoroughly instructed to remove the bandage in 48 hours and to follow-up within 1 week to have the suture removed  He was strongly encouraged to resume all preadmission medications at all preadmission dosages  He was also encouraged to follow-up with his PCP within 7-14 days "    "Left lower lobe solid nodule measuring 5 mm, new since 2/28/2022   Based on current Fleischner Society 2017 Guidelines on incidental pulmonary nodule, patients with a known malignancy are at increased risk of metastasis and should receive initial   three month follow-up chest CT "    During the TCM phone call patient stated:    TCM Call     Date and time call was made  2/24/2023 10:20 AM    Patient was hospitialized at  [address]    Date of Admission  02/20/23    Date of discharge  02/23/23    Diagnosis  pueumothorax on right    Disposition  Home    Current Symptoms  None  needs a stitch removed    Cough Severity  Moderate    Shortness of breath severity  Moderate      TCM Call     Post hospital issues  None    Scheduled for follow up? Yes    Did you obtain your prescribed medications  Yes    Do you need help managing your prescriptions or medications  No    Is transportation to your appointment needed  No    I have advised the patient to call PCP with any new or worsening symptoms  Colette TREVINO    Living Arrangements  Children; Spouse or Significiant other    Support System  Children; Parent    The type of support provided  Emotional; Physical; Other (comment)    Do you have social support  Yes, as much as I need          During today's visit:    He tries to decrease his oxygen to 2L, sometimes 86-90%, sometimes has to bump it up to 3L  Overall feeling good, feeling more like himself today  Using CPAP at night  Today or tomorrow last day of prednisone  Needs suture removed  They are complying with their medication changes and discharge instructions  They have the following questions: As above    Review of Systems   All other systems reviewed and are negative        Historical Information     The patient history was reviewed as follows:    Past Medical History:   Diagnosis Date   • Bilateral carotid artery stenosis    • Cancer (HCC)     skin   • Chronic diastolic heart failure (HCC)    • Chronic ischemic heart disease    • Chronic kidney disease     stage 3   • Colon polyp    • COPD (chronic obstructive pulmonary disease) ([place] [address] )    • Coronary artery disease     hx stents, MI, PCI   • COVID 11/2021   • CPAP (continuous positive airway pressure) dependence    • Emphysema of lung ([place] [address] ) 1995 started   • Hearing loss    • History of transfusion 1995   • Hyperlipidemia    • Hypertension    • MI (myocardial infarction) ([place] [address] ) 1995   • Myocardial infarction ([place] [address] ) 1995   • Pneumonia    • Prostate cancer ([place] [address] )    • RSV (respiratory syncytial virus infection) 10/2022   • S/P carotid endarterectomy    • Shortness of breath     O2 2 l/nc PRN   • Sleep apnea    • Sleep apnea, obstructive    • Stented coronary artery      Past Surgical History:   Procedure Laterality Date   • APPENDECTOMY     • CARDIAC CATHETERIZATION  03/18/2021    left main with no significant disease, proximal LAD with 10% stenosis at the site of prior stent, left circumflex artery with minimal luminal irregularities mid RCA with 50% stenosis at site of prior stent and PL segment with distal disease supplied by collaterals from the distal circumflex with no significant CAD requiring revascularization at that time     • CATARACT EXTRACTION, BILATERAL Bilateral    • COLONOSCOPY     • CORONARY ANGIOPLASTY WITH STENT PLACEMENT  1999    RCA   • CORONARY ANGIOPLASTY WITH STENT PLACEMENT  2001    RCA   • CORONARY ANGIOPLASTY WITH STENT PLACEMENT  2003    LAD   • EYE SURGERY     • AZ BX PROSTATE STRTCTC SATURATION SAMPLING IMG GID N/A 09/13/2022    Procedure: TRANSPERINEAL MRI FUSION  BIOPSY PROSTATE;  Surgeon: Pancho Mendez MD;  Location: [facility];  Service: Urology   • AZ NEUROPLASTY &/TRANSPOS MEDIAN NRV Sandi Huan Left 07/22/2022    Procedure: RELEASE CARPAL TUNNEL;  Surgeon: Shelby Lackey MD;  Location: [facility];  Service: Orthopedics   • AZ NEUROPLASTY &/TRANSPOSITION ULNAR NERVE ELBOW Left 07/22/2022    Procedure: RELEASE CUBITAL TUNNEL;  Surgeon: Shelby Lackey MD;  Location: [facility];  Service: Orthopedics   • AZ NEUROPLASTY &/TRANSPOSITION ULNAR NERVE WRIST Left 2022    Procedure: [place];  Surgeon: Tasha Soria MD;  Location: [facility];  Service: Orthopedics   • SKIN CANCER EXCISION      chin-per pt, basal cell  also right ankle     Family History   Problem Relation Age of Onset   • Heart attack Mother    • Dementia Mother    • Heart failure Mother             • No Known Problems Father       Social History   Social History     Substance and Sexual Activity   Alcohol Use Never     Social History     Substance and Sexual Activity   Drug Use Never     Social History     Tobacco Use   Smoking Status Former   • Packs/day: 2 00   • Years: 35 00   • Pack years: 70 00   • Types: Cigarettes   • Start date: 1960   • Quit date: 1995   • Years since quittin 6   Smokeless Tobacco Never   Tobacco Comments    stopped        Medications:   Meds/Allergies     Current Outpatient Medications:   •  albuterol (2 5 mg/3 mL) 0 083 % nebulizer solution, Take 3 mL (2 5 mg total) by nebulization every 6 (six) hours as needed for wheezing or shortness of breath, Disp: 60 mL, Rfl: 1  •  aspirin 81 mg chewable tablet, CHEW ONE TABLET BY MOUTH EVERY DAY, Disp: , Rfl:   •  azithromycin (ZITHROMAX) 500 MG tablet, Take 1 tablet (500 mg total) by mouth 3 (three) times a week, Disp: 12 tablet, Rfl: 2  •  Blood Pressure Monitor KIT, Use daily (Patient taking differently: Use 40 mg daily), Disp: 1 kit, Rfl: 0  •  budesonide (PULMICORT) 0 5 mg/2 mL nebulizer solution, Take 2 mL (0 5 mg total) by nebulization every 12 (twelve) hours Rinse mouth after use , Disp: 120 mL, Rfl: 0  •  dextromethorphan-guaiFENesin (ROBITUSSIN DM)  mg/5 mL syrup, Take 5 mL by mouth 3 (three) times a day as needed for cough, Disp: 354 mL, Rfl: 0  •  famotidine (PEPCID) 20 mg tablet, Take 1 tablet (20 mg total) by mouth daily at bedtime, Disp: 100 tablet, Rfl: 1  •  fluticasone (FLONASE) 50 mcg/act nasal spray, into each nostril as needed , Disp: , Rfl:   • furosemide (LASIX) 40 mg tablet, Take 1 tablet (40 mg total) by mouth daily, Disp: 100 tablet, Rfl: 3  •  gabapentin (NEURONTIN) 300 mg capsule, Take 1 capsule (300 mg total) by mouth 2 (two) times a day, Disp: , Rfl:   •  metoprolol succinate (TOPROL-XL) 25 mg 24 hr tablet, Take 0 5 tablets (12 5 mg total) by mouth 2 (two) times a day, Disp: 90 tablet, Rfl: 0  •  oxygen gas, Inhale 2 L/min continuous  2LPM at rest and 3LPM with activity per ROXANNE FANG notes  Indications: SOB, pulmonary edema suspected, Disp: , Rfl:   •  potassium chloride (Klor-Con) 10 mEq tablet, Take 2 tablets (20 mEq total) by mouth daily, Disp: 200 tablet, Rfl: 3  •  predniSONE 10 mg tablet, Take 1 tablet (10 mg total) by mouth daily for 3 doses Do not start before February 25, 2023 , Disp: 3 tablet, Rfl: 0  •  rosuvastatin (CRESTOR) 40 MG tablet, TAKE 1 TABLET DAILY (Patient taking differently: Take 40 mg by mouth daily at bedtime), Disp: 100 tablet, Rfl: 3  •  Tiotropium Bromide Monohydrate (SPIRIVA RESPIMAT IN), Inhale every morning, Disp: , Rfl:   Allergies   Allergen Reactions   • Lisinopril Swelling and Cough   • Tetanus Antitoxin Anaphylaxis   • Tetanus Toxoid Anaphylaxis and Swelling       OBJECTIVE    Vitals:   Vitals:    02/28/23 1052   BP: 120/70   Pulse: 101   Resp: 18   Temp: 98 3 °F (36 8 °C)   TempSrc: Tympanic   SpO2: 92%   Weight: 92 1 kg (203 lb)   Height: 5' 8" (1 727 m)       Body mass index is 30 87 kg/m²  Physical Exam:    Physical Exam  Vitals reviewed  Constitutional:       General: He is not in acute distress  Appearance: Normal appearance  He is not ill-appearing, toxic-appearing or diaphoretic  HENT:      Head: Normocephalic and atraumatic  Eyes:      General:         Right eye: No discharge  Left eye: No discharge  Extraocular Movements: Extraocular movements intact  Conjunctiva/sclera: Conjunctivae normal    Cardiovascular:      Rate and Rhythm: Normal rate and regular rhythm  Heart sounds: Normal heart sounds  No murmur heard  No friction rub  No gallop  Pulmonary:      Effort: Pulmonary effort is normal  No respiratory distress  Breath sounds: No stridor  Wheezing present  No rhonchi  Comments: Scattered crackles, wheezing, diminished breath sounds   Musculoskeletal:         General: No swelling, tenderness or signs of injury  Comments: Chest tube suture in place    Skin:     General: Skin is warm  Coloration: Skin is not pale  Findings: No erythema or rash  Neurological:      Mental Status: He is alert and oriented to person, place, and time  Motor: No weakness  Psychiatric:         Mood and Affect: Mood normal          Behavior: Behavior normal           Labs: I have personally reviewed all pertinent results       Admission on 02/20/2023, Discharged on 02/23/2023   Component Date Value Ref Range Status   • Ventricular Rate 02/20/2023 91  BPM Final   • Atrial Rate 02/20/2023 91  BPM Final   • WI Interval 02/20/2023 156  ms Final   • QRSD Interval 02/20/2023 92  ms Final   • QT Interval 02/20/2023 372  ms Final   • QTC Interval 02/20/2023 457  ms Final   • P Axis 02/20/2023 87  degrees Final   • QRS Axis 02/20/2023 74  degrees Final   • T Wave Axis 02/20/2023 53  degrees Final   • WBC 02/20/2023 28 19 (H)  4 31 - 10 16 Thousand/uL Final   • RBC 02/20/2023 4 86  3 88 - 5 62 Million/uL Final   • Hemoglobin 02/20/2023 15 9  12 0 - 17 0 g/dL Final   • Hematocrit 02/20/2023 50 7 (H)  36 5 - 49 3 % Final   • MCV 02/20/2023 104 (H)  82 - 98 fL Final   • MCH 02/20/2023 32 7  26 8 - 34 3 pg Final   • MCHC 02/20/2023 31 4  31 4 - 37 4 g/dL Final   • RDW 02/20/2023 13 7  11 6 - 15 1 % Final   • MPV 02/20/2023 11 9  8 9 - 12 7 fL Final   • Platelets 57/66/6578 210  149 - 390 Thousands/uL Final   • Sodium 02/20/2023 140  135 - 147 mmol/L Final   • Potassium 02/20/2023 3 6  3 5 - 5 3 mmol/L Final   • Chloride 02/20/2023 96  96 - 108 mmol/L Final   • CO2 02/20/2023 39 (H)  21 - 32 mmol/L Final   • ANION GAP 02/20/2023 5  4 - 13 mmol/L Final   • BUN 02/20/2023 26 (H)  5 - 25 mg/dL Final   • Creatinine 02/20/2023 1 29  0 60 - 1 30 mg/dL Final    Standardized to IDMS reference method   • Glucose 02/20/2023 196 (H)  65 - 140 mg/dL Final    If the patient is fasting, the ADA then defines impaired fasting glucose as > 100 mg/dL and diabetes as > or equal to 123 mg/dL  Specimen collection should occur prior to Sulfasalazine administration due to the potential for falsely depressed results  Specimen collection should occur prior to Sulfapyridine administration due to the potential for falsely elevated results     • Calcium 02/20/2023 9 3  8 4 - 10 2 mg/dL Final   • eGFR 02/20/2023 52  ml/min/1 73sq m Final   • Ventricular Rate 02/20/2023 87  BPM Final   • Atrial Rate 02/20/2023 87  BPM Final   • AL Interval 02/20/2023 152  ms Final   • QRSD Interval 02/20/2023 94  ms Final   • QT Interval 02/20/2023 368  ms Final   • QTC Interval 02/20/2023 442  ms Final   • P Axis 02/20/2023 78  degrees Final   • QRS Axis 02/20/2023 69  degrees Final   • T Wave Axis 02/20/2023 84  degrees Final   • Segmented % 02/20/2023 89 (H)  43 - 75 % Final   • Bands % 02/20/2023 3  0 - 8 % Final   • Lymphocytes % 02/20/2023 2 (L)  14 - 44 % Final   • Monocytes % 02/20/2023 6  4 - 12 % Final   • Eosinophils, % 02/20/2023 0  0 - 6 % Final   • Basophils % 02/20/2023 0  0 - 1 % Final   • Absolute Neutrophils 02/20/2023 25 93 (H)  1 85 - 7 62 Thousand/uL Final   • Lymphocytes Absolute 02/20/2023 0 56 (L)  0 60 - 4 47 Thousand/uL Final   • Monocytes Absolute 02/20/2023 1 69 (H)  0 00 - 1 22 Thousand/uL Final   • Eosinophils Absolute 02/20/2023 0 00  0 00 - 0 40 Thousand/uL Final   • Basophils Absolute 02/20/2023 0 00  0 00 - 0 10 Thousand/uL Final   • RBC Morphology 02/20/2023 Present   Final   • Anisocytosis 02/20/2023 Present   Final   • Joe Cells 02/20/2023 Present   Final   • Tear Drop Cells 02/20/2023 Present   Final   • Platelet Estimate 71/05/8921 Adequate  Adequate Final   • Large Platelet 59/02/4267 Present   Final   • POC Glucose 02/21/2023 142 (H)  65 - 140 mg/dl Final   • Sodium 02/21/2023 139  135 - 147 mmol/L Final   • Potassium 02/21/2023 4 0  3 5 - 5 3 mmol/L Final   • Chloride 02/21/2023 101  96 - 108 mmol/L Final   • CO2 02/21/2023 36 (H)  21 - 32 mmol/L Final   • ANION GAP 02/21/2023 2 (L)  4 - 13 mmol/L Final   • BUN 02/21/2023 28 (H)  5 - 25 mg/dL Final   • Creatinine 02/21/2023 1 12  0 60 - 1 30 mg/dL Final    Standardized to IDMS reference method   • Glucose 02/21/2023 161 (H)  65 - 140 mg/dL Final    If the patient is fasting, the ADA then defines impaired fasting glucose as > 100 mg/dL and diabetes as > or equal to 123 mg/dL  Specimen collection should occur prior to Sulfasalazine administration due to the potential for falsely depressed results  Specimen collection should occur prior to Sulfapyridine administration due to the potential for falsely elevated results     • Calcium 02/21/2023 8 4  8 4 - 10 2 mg/dL Final   • eGFR 02/21/2023 62  ml/min/1 73sq m Final   • WBC 02/21/2023 24 89 (H)  4 31 - 10 16 Thousand/uL Final   • RBC 02/21/2023 4 42  3 88 - 5 62 Million/uL Final   • Hemoglobin 02/21/2023 14 4  12 0 - 17 0 g/dL Final   • Hematocrit 02/21/2023 45 6  36 5 - 49 3 % Final   • MCV 02/21/2023 103 (H)  82 - 98 fL Final   • MCH 02/21/2023 32 6  26 8 - 34 3 pg Final   • MCHC 02/21/2023 31 6  31 4 - 37 4 g/dL Final   • RDW 02/21/2023 13 8  11 6 - 15 1 % Final   • MPV 02/21/2023 10 8  8 9 - 12 7 fL Final   • Platelets 10/80/7501 172  149 - 390 Thousands/uL Final   • nRBC 02/21/2023 0  /100 WBCs Final   • Neutrophils Relative 02/21/2023 88 (H)  43 - 75 % Final   • Immat GRANS % 02/21/2023 0  0 - 2 % Final   • Lymphocytes Relative 02/21/2023 3 (L)  14 - 44 % Final   • Monocytes Relative 02/21/2023 9  4 - 12 % Final   • Eosinophils Relative 02/21/2023 0  0 - 6 % Final   • Basophils Relative 02/21/2023 0  0 - 1 % Final   • Neutrophils Absolute 02/21/2023 21 94 (H)  1 85 - 7 62 Thousands/µL Final   • Immature Grans Absolute 02/21/2023 0 11  0 00 - 0 20 Thousand/uL Final   • Lymphocytes Absolute 02/21/2023 0 64  0 60 - 4 47 Thousands/µL Final   • Monocytes Absolute 02/21/2023 2 17 (H)  0 17 - 1 22 Thousand/µL Final   • Eosinophils Absolute 02/21/2023 0 00  0 00 - 0 61 Thousand/µL Final   • Basophils Absolute 02/21/2023 0 03  0 00 - 0 10 Thousands/µL Final   • Magnesium 02/21/2023 2 2  1 9 - 2 7 mg/dL Final   • Phosphorus 02/21/2023 2 9  2 3 - 4 1 mg/dL Final   • POC Glucose 02/21/2023 144 (H)  65 - 140 mg/dl Final   • POC Glucose 02/21/2023 203 (H)  65 - 140 mg/dl Final   • POC Glucose 02/21/2023 128  65 - 140 mg/dl Final   • POC Glucose 02/21/2023 149 (H)  65 - 140 mg/dl Final   • WBC 02/22/2023 22 26 (H)  4 31 - 10 16 Thousand/uL Final   • RBC 02/22/2023 4 53  3 88 - 5 62 Million/uL Final   • Hemoglobin 02/22/2023 15 0  12 0 - 17 0 g/dL Final   • Hematocrit 02/22/2023 46 9  36 5 - 49 3 % Final   • MCV 02/22/2023 104 (H)  82 - 98 fL Final   • MCH 02/22/2023 33 1  26 8 - 34 3 pg Final   • MCHC 02/22/2023 32 0  31 4 - 37 4 g/dL Final   • RDW 02/22/2023 13 6  11 6 - 15 1 % Final   • Platelets 08/99/2899 180  149 - 390 Thousands/uL Final   • MPV 02/22/2023 11 7  8 9 - 12 7 fL Final   • Sodium 02/22/2023 140  135 - 147 mmol/L Final   • Potassium 02/22/2023 4 0  3 5 - 5 3 mmol/L Final   • Chloride 02/22/2023 99  96 - 108 mmol/L Final   • CO2 02/22/2023 40 (H)  21 - 32 mmol/L Final   • ANION GAP 02/22/2023 1 (L)  4 - 13 mmol/L Final   • BUN 02/22/2023 23  5 - 25 mg/dL Final   • Creatinine 02/22/2023 1 07  0 60 - 1 30 mg/dL Final    Standardized to IDMS reference method   • Glucose 02/22/2023 117  65 - 140 mg/dL Final    If the patient is fasting, the ADA then defines impaired fasting glucose as > 100 mg/dL and diabetes as > or equal to 123 mg/dL    Specimen collection should occur prior to Sulfasalazine administration due to the potential for falsely depressed results  Specimen collection should occur prior to Sulfapyridine administration due to the potential for falsely elevated results     • Calcium 02/22/2023 9 0  8 4 - 10 2 mg/dL Final   • eGFR 02/22/2023 66  ml/min/1 73sq m Final   • POC Glucose 02/22/2023 117  65 - 140 mg/dl Final   • POC Glucose 02/22/2023 237 (H)  65 - 140 mg/dl Final   • POC Glucose 02/22/2023 138  65 - 140 mg/dl Final   • POC Glucose 02/22/2023 142 (H)  65 - 140 mg/dl Final   • WBC 02/23/2023 20 29 (H)  4 31 - 10 16 Thousand/uL Final   • RBC 02/23/2023 4 53  3 88 - 5 62 Million/uL Final   • Hemoglobin 02/23/2023 14 7  12 0 - 17 0 g/dL Final   • Hematocrit 02/23/2023 46 7  36 5 - 49 3 % Final   • MCV 02/23/2023 103 (H)  82 - 98 fL Final   • MCH 02/23/2023 32 5  26 8 - 34 3 pg Final   • MCHC 02/23/2023 31 5  31 4 - 37 4 g/dL Final   • RDW 02/23/2023 13 6  11 6 - 15 1 % Final   • MPV 02/23/2023 11 3  8 9 - 12 7 fL Final   • Platelets 41/96/1694 190  149 - 390 Thousands/uL Final   • nRBC 02/23/2023 0  /100 WBCs Final   • Neutrophils Relative 02/23/2023 87 (H)  43 - 75 % Final   • Immat GRANS % 02/23/2023 1  0 - 2 % Final   • Lymphocytes Relative 02/23/2023 3 (L)  14 - 44 % Final   • Monocytes Relative 02/23/2023 9  4 - 12 % Final   • Eosinophils Relative 02/23/2023 0  0 - 6 % Final   • Basophils Relative 02/23/2023 0  0 - 1 % Final   • Neutrophils Absolute 02/23/2023 17 81 (H)  1 85 - 7 62 Thousands/µL Final   • Immature Grans Absolute 02/23/2023 0 17  0 00 - 0 20 Thousand/uL Final   • Lymphocytes Absolute 02/23/2023 0 54 (L)  0 60 - 4 47 Thousands/µL Final   • Monocytes Absolute 02/23/2023 1 74 (H)  0 17 - 1 22 Thousand/µL Final   • Eosinophils Absolute 02/23/2023 0 00  0 00 - 0 61 Thousand/µL Final   • Basophils Absolute 02/23/2023 0 03  0 00 - 0 10 Thousands/µL Final   • Sodium 02/23/2023 139  135 - 147 mmol/L Final   • Potassium 02/23/2023 4 1  3 5 - 5 3 mmol/L Final   • Chloride 02/23/2023 98  96 - 108 mmol/L Final   • CO2 02/23/2023 39 (H)  21 - 32 mmol/L Final   • ANION GAP 02/23/2023 2 (L)  4 - 13 mmol/L Final   • BUN 02/23/2023 24  5 - 25 mg/dL Final   • Creatinine 02/23/2023 0 99  0 60 - 1 30 mg/dL Final    Standardized to IDMS reference method   • Glucose 02/23/2023 125  65 - 140 mg/dL Final    If the patient is fasting, the ADA then defines impaired fasting glucose as > 100 mg/dL and diabetes as > or equal to 123 mg/dL  Specimen collection should occur prior to Sulfasalazine administration due to the potential for falsely depressed results  Specimen collection should occur prior to Sulfapyridine administration due to the potential for falsely elevated results  • Calcium 02/23/2023 8 7  8 4 - 10 2 mg/dL Final   • eGFR 02/23/2023 72  ml/min/1 73sq m Final   • Magnesium 02/23/2023 2 3  1 9 - 2 7 mg/dL Final   • Phosphorus 02/23/2023 4 0  2 3 - 4 1 mg/dL Final   • POC Glucose 02/23/2023 121  65 - 140 mg/dl Final   • POC Glucose 02/23/2023 152 (H)  65 - 140 mg/dl Final       Imaging:  I have personally reviewed all pertinent results  Assessment/Plan    No problem-specific Assessment & Plan notes found for this encounter  Lizette Ponce was seen today for transition of care management  Diagnoses and all orders for this visit:    Pneumothorax on right    Chronic obstructive pulmonary disease, unspecified COPD type ([facility] [address] )  -     CBC and differential; Future  -     Comprehensive metabolic panel; Future    Acute on chronic respiratory failure with hypoxia (HCC)    Pulmonary nodule    Other orders  -     Cancel: Shower Chair with Back [$]      Suture removed today successfully  Blood work ordered  Follow-up with pulmonology, discuss pulmonary nodule finding         Future Appointments   Date Time Provider Noel Mcallister   3/7/2023 10:30 AM Marshall Mandel MD [address]   3/8/2023  2:20 PM DO ALYSSA Geller [facility]   3/13/2023  3:00 PM ROSIBEL Urbano [facility]   3/17/2023  1:45 PM INOCENTE Luna [facility]   3/21/2023  4:20 PM DO MECHE Mcgovern [facility]   5/15/2023  1:00 PM DO SHERRON [facility]          [place] ,   [facility]

## 2023-03-07 ENCOUNTER — CONSULT (OUTPATIENT)
Dept: VASCULAR SURGERY | Facility: CLINIC | Age: 78
End: 2023-03-07

## 2023-03-07 VITALS
HEART RATE: 86 BPM | HEIGHT: 68 IN | OXYGEN SATURATION: 96 % | TEMPERATURE: 97.3 F | DIASTOLIC BLOOD PRESSURE: 64 MMHG | BODY MASS INDEX: 31.16 KG/M2 | WEIGHT: 205.6 LBS | SYSTOLIC BLOOD PRESSURE: 122 MMHG

## 2023-03-07 DIAGNOSIS — J44.1 COPD EXACERBATION (HCC): ICD-10-CM

## 2023-03-07 DIAGNOSIS — I65.22 OCCLUSION OF LEFT INTERNAL CAROTID ARTERY: Primary | ICD-10-CM

## 2023-03-07 DIAGNOSIS — I65.22 LEFT CAROTID ARTERY OCCLUSION: ICD-10-CM

## 2023-03-07 DIAGNOSIS — Z98.890 S/P CAROTID ENDARTERECTOMY: ICD-10-CM

## 2023-03-07 RX ORDER — ALBUTEROL SULFATE 2.5 MG/3ML
2.5 SOLUTION RESPIRATORY (INHALATION) EVERY 6 HOURS PRN
Qty: 60 ML | Refills: 5 | Status: SHIPPED | OUTPATIENT
Start: 2023-03-07

## 2023-03-07 NOTE — PROGRESS NOTES
Assessment/Plan:    Pt is a 66 yo M w/ GERD, LAMBERTO, COPD, HTN, CAD, dCHF, back pain, OA, CKD, prostate cancer, HLD, MDD, chest wall pain, carotid stenosis s/p R CEA ~'00, known L ICA occlusion    Occlusion of left internal carotid artery  S/P carotid endarterectomy  Left carotid artery occlusion  -     Ambulatory Referral to Vascular Surgery  -     VAS carotid complete study; Future  -s/p R CEA for asymptomatic stenosis in the '00s at OSH  -known L ICA occlusion at time of that surgery  -reviewed last carotid DU which shows widely patent R CEA site and stable L ICA occlusion  -reviewed CTA which shows widely patent R CEA site and L ICA longsegment occlusion with distal recon; B verts patent; L origin moderate stenosis  -recent episode of R side weakness lasting 1-2min  -hsi carotid disease is stable and there is no indication for intervention; I do not think his stable L ICA occlusion could have caused his short interval R side weakness; further evaluation for this should be performed  -will start surveillance w/ DU on a yearly basis  -f/u 1 year    Medications  -continue ASA/statin     Subjective:      Patient ID: Jami Hensley is a 65 y o  male  New patient referred by DR Casimiro Crystal for occlusion left internal carotid artery had a CTA head/neck done 2/11/23  Pt c/o dizziness at times and right eye blurring on and off weakness to right leg at times but he has had that for years  PT is taking ASA 81 mg and Rosuvastatin  Pt is a former smoker  HPI:    He is referred by PCP for carotid disease  He is s/p R CEA at OSH in about '00, and known L ICA occlusion  This was done for asymptomatic disease  In Jan, he had an episode of R side weakness while walking  This lasted 1-2min  He went in for evaluation and underwent stroke workup which was negative  He is having R chest wall pain and underwent nerve block (successful) unfortunately, c/b PTX s/p chest tube, now removed    He is currently on O2, baseline 2L at night and with exertion and currently all the time since Jan when he had COVID  He has prostate cancer and is planned for radiation once he is medically stable for this  Taking ASA/statin  Past smoker, quit '95      The following portions of the patient's history were reviewed and updated as appropriate: allergies, current medications, past family history, past medical history, past social history, past surgical history and problem list     Review of Systems   Constitutional: Negative  HENT: Negative  Eyes: Positive for visual disturbance  Respiratory: Positive for cough, shortness of breath and wheezing  Cardiovascular: Positive for chest pain  Gastrointestinal: Negative  Endocrine: Negative  Genitourinary: Positive for difficulty urinating  Musculoskeletal: Positive for gait problem (unsteady gait; uses walker for distances)  Skin: Negative  Negative for wound  Allergic/Immunologic: Negative  Neurological: Positive for dizziness and weakness (episode of R side weakness)  Hematological: Bruises/bleeds easily  Psychiatric/Behavioral: Negative  Objective:      /64 (BP Location: Right arm, Patient Position: Sitting, Cuff Size: Standard)   Pulse 86   Temp (!) 97 3 °F (36 3 °C) (Temporal)   Ht 5' 8" (1 727 m)   Wt 93 3 kg (205 lb 9 6 oz)   SpO2 96%   BMI 31 26 kg/m²          Physical Exam  Cardiovascular:      Pulses:           Radial pulses are 2+ on the right side and 2+ on the left side  Dorsalis pedis pulses are 2+ on the right side and 2+ on the left side  Posterior tibial pulses are 1+ on the right side and 0 on the left side  Heart sounds: No murmur heard  Comments: No carotid stenosis  Pulmonary:      Effort: No respiratory distress  Breath sounds: No wheezing or rales  Comments: On cont O2  Abdominal:      Palpations: There is no pulsatile mass        Comments: Soft, NT, ND   Musculoskeletal:      Right lower le+ Edema present  Left lower le+ Edema present  Skin:     Findings: Bruising present  Comments: Spots of ecchymosis over the BUE           I have reviewed and made appropriate changes to the review of systems input by the medical assistant      Vitals:    23 1018 23 1021   BP: 110/62 122/64   BP Location: Left arm Right arm   Patient Position: Sitting Sitting   Cuff Size: Standard Standard   Pulse: 86    Temp: (!) 97 3 °F (36 3 °C)    TempSrc: Temporal    SpO2: 96%    Weight: 93 3 kg (205 lb 9 6 oz)    Height: 5' 8" (1 727 m)        Patient Active Problem List   Diagnosis   • Hyperlipidemia   • Coronary artery disease involving native coronary artery of native heart without angina pectoris   • Bilateral carotid artery stenosis   • Hypertension   • COPD with exacerbation ([facility])   • Oxygen dependent   • Depression, recurrent ([facility])   • S/P carotid endarterectomy   • Stented coronary artery   • Vertigo   • Anisometropia   • Osteoarthritis of spinal facet joint   • Chronic ischemic heart disease   • Chronic diastolic (congestive) heart failure ([facility])   • Diverticular disease of colon   • Dry eye syndrome   • Gastroesophageal reflux disease   • Hearing loss   • Elevated PSA   • Hypoxia   • Lens replaced by other means   • Lumbar radiculopathy   • Multinodular goiter   • Nuclear senile cataract   • Obesity   • Occlusion and stenosis of carotid artery   • Osteoarthritis of hip   • Prediabetes   • Psychosexual dysfunction with inhibited sexual excitement   • LAMBERTO on CPAP   • Solitary pulmonary nodule   • Thyroid nodule   • Guyon syndrome, left   • Costochondral chest pain   • Chest pain   • Abnormal nuclear stress test   • Right hip pain   • Primary insomnia   • Chronic right-sided thoracic back pain   • Stage 3a chronic kidney disease (HCC)   • Hoarseness or changing voice   • Chronic bilateral low back pain with right-sided sciatica   • Mid back pain   • Thoracic radiculopathy   • Chronic pain syndrome   • Urinary frequency   • Incomplete bladder emptying   • Intercostal neuralgia   • Cubital tunnel syndrome on left   • Carpal tunnel syndrome on left   • Elevated MCV   • Prostate cancer (HCC)   • Acute on chronic respiratory failure with hypoxia (HCC)   • Obstructive sleep apnea syndrome in adult   • Sensorineural hearing loss, bilateral   • RSV (respiratory syncytial virus infection)   • Right lower quadrant abdominal pain   • COVID   • Ambulatory dysfunction   • COPD (chronic obstructive pulmonary disease) ([facility])   • Hypokalemia   • Left leg pain   • Transient right leg weakness   • Pneumothorax on right   • Leukocytosis       Past Surgical History:   Procedure Laterality Date   • APPENDECTOMY     • CARDIAC CATHETERIZATION  03/18/2021    left main with no significant disease, proximal LAD with 10% stenosis at the site of prior stent, left circumflex artery with minimal luminal irregularities mid RCA with 50% stenosis at site of prior stent and PL segment with distal disease supplied by collaterals from the distal circumflex with no significant CAD requiring revascularization at that time     • CATARACT EXTRACTION, BILATERAL Bilateral    • COLONOSCOPY     • CORONARY ANGIOPLASTY WITH STENT PLACEMENT  1999    RCA   • CORONARY ANGIOPLASTY WITH STENT PLACEMENT  2001    RCA   • CORONARY ANGIOPLASTY WITH STENT PLACEMENT  2003    LAD   • EYE SURGERY     • CT BX PROSTATE STRTCTC SATURATION SAMPLING IMG GID N/A 09/13/2022    Procedure: TRANSPERINEAL MRI FUSION  BIOPSY PROSTATE;  Surgeon: Ollie Bailey MD;  Location: [facility];  Service: Urology   • CT NEUROPLASTY &/TRANSPOS MEDIAN NRV CARPAL Deloria Miguel Left 07/22/2022    Procedure: RELEASE CARPAL TUNNEL;  Surgeon: Kayla Yanes MD;  Location: [facility];  Service: Orthopedics   • CT NEUROPLASTY &/TRANSPOSITION ULNAR NERVE ELBOW Left 07/22/2022    Procedure: RELEASE CUBITAL TUNNEL;  Surgeon: Kayla Yanes MD;  Location: [facility];  Service: Orthopedics   • IN NEUROPLASTY &/TRANSPOSITION ULNAR NERVE WRIST Left 2022    Procedure: Jermaine Bueno;  Surgeon: Giorgio Carias MD;  Location: [facility];  Service: Orthopedics   • SKIN CANCER EXCISION      chin-per pt, basal cell  also right ankle       Family History   Problem Relation Age of Onset   • Heart attack Mother    • Dementia Mother    • Heart failure Mother             • No Known Problems Father        Social History     Socioeconomic History   • Marital status: /Civil Union     Spouse name: Not on file   • Number of children: Not on file   • Years of education: Not on file   • Highest education level: Not on file   Occupational History   • Not on file   Tobacco Use   • Smoking status: Former     Packs/day: 2 00     Years: 35 00     Pack years: 70 00     Types: Cigarettes     Start date: 1960     Quit date: 1995     Years since quittin 6   • Smokeless tobacco: Never   • Tobacco comments:     stopped    Vaping Use   • Vaping Use: Never used   Substance and Sexual Activity   • Alcohol use: Never   • Drug use: Never   • Sexual activity: Yes     Partners: Female   Other Topics Concern   • Not on file   Social History Narrative   • Not on file     Social Determinants of Health     Financial Resource Strain: Not on file   Food Insecurity: No Food Insecurity   • Worried About Running Out of Food in the Last Year: Never true   • Ran Out of Food in the Last Year: Never true   Transportation Needs: No Transportation Needs   • Lack of Transportation (Medical): No   • Lack of Transportation (Non-Medical):  No   Physical Activity: Not on file   Stress: Not on file   Social Connections: Not on file   Intimate Partner Violence: Not on file   Housing Stability: Low Risk    • Unable to Pay for Housing in the Last Year: No   • Number of Places Lived in the Last Year: 1   • Unstable Housing in the Last Year: No       Allergies   Allergen Reactions   • Lisinopril Swelling and Cough • Tetanus Antitoxin Anaphylaxis   • Tetanus Toxoid Anaphylaxis and Swelling         Current Outpatient Medications:   •  albuterol (2 5 mg/3 mL) 0 083 % nebulizer solution, Take 3 mL (2 5 mg total) by nebulization every 6 (six) hours as needed for wheezing or shortness of breath (Patient taking differently: Take 2 5 mg by nebulization 3 (three) times a day), Disp: 60 mL, Rfl: 1  •  aspirin 81 mg chewable tablet, CHEW ONE TABLET BY MOUTH EVERY DAY, Disp: , Rfl:   •  azithromycin (ZITHROMAX) 500 MG tablet, Take 1 tablet (500 mg total) by mouth 3 (three) times a week, Disp: 12 tablet, Rfl: 2  •  Blood Pressure Monitor KIT, Use daily (Patient taking differently: Use 40 mg daily), Disp: 1 kit, Rfl: 0  •  budesonide (PULMICORT) 0 5 mg/2 mL nebulizer solution, Take 2 mL (0 5 mg total) by nebulization every 12 (twelve) hours Rinse mouth after use , Disp: 120 mL, Rfl: 0  •  dextromethorphan-guaiFENesin (ROBITUSSIN DM)  mg/5 mL syrup, Take 5 mL by mouth 3 (three) times a day as needed for cough, Disp: 354 mL, Rfl: 0  •  famotidine (PEPCID) 20 mg tablet, Take 1 tablet (20 mg total) by mouth daily at bedtime, Disp: 100 tablet, Rfl: 1  •  fluticasone (FLONASE) 50 mcg/act nasal spray, into each nostril as needed , Disp: , Rfl:   •  gabapentin (NEURONTIN) 300 mg capsule, Take 1 capsule (300 mg total) by mouth 2 (two) times a day, Disp: , Rfl:   •  metoprolol succinate (TOPROL-XL) 25 mg 24 hr tablet, Take 0 5 tablets (12 5 mg total) by mouth 2 (two) times a day, Disp: 90 tablet, Rfl: 0  •  oxygen gas, Inhale 2 L/min continuous   2LPM at rest and 3LPM with activity per D Cedric FANG notes  Indications: SOB, pulmonary edema suspected, Disp: , Rfl:   •  Tiotropium Bromide Monohydrate (SPIRIVA RESPIMAT IN), Inhale every morning, Disp: , Rfl:   •  furosemide (LASIX) 40 mg tablet, Take 1 tablet (40 mg total) by mouth daily, Disp: 100 tablet, Rfl: 3  •  potassium chloride (Klor-Con) 10 mEq tablet, Take 2 tablets (20 mEq total) by mouth daily, Disp: 200 tablet, Rfl: 3  •  rosuvastatin (CRESTOR) 40 MG tablet, TAKE 1 TABLET DAILY (Patient taking differently: Take 40 mg by mouth daily at bedtime), Disp: 100 tablet, Rfl: 3

## 2023-03-07 NOTE — PATIENT INSTRUCTIONS
1) Carotid disease  -you had a carotid endarterectomy surgery on the right side and have a known complete blockage on the left  -this is stable over many years and I don't recommend any surgery/intervention at this time  -we will continue to monitor this with ultrasound on a yearly basis    2) Medications  -please continue your aspirin and statin medications

## 2023-03-08 ENCOUNTER — OFFICE VISIT (OUTPATIENT)
Dept: CARDIOLOGY CLINIC | Facility: CLINIC | Age: 78
End: 2023-03-08

## 2023-03-08 VITALS
HEIGHT: 68 IN | BODY MASS INDEX: 31.07 KG/M2 | OXYGEN SATURATION: 93 % | DIASTOLIC BLOOD PRESSURE: 74 MMHG | WEIGHT: 205 LBS | HEART RATE: 95 BPM | SYSTOLIC BLOOD PRESSURE: 120 MMHG

## 2023-03-08 DIAGNOSIS — I25.10 CORONARY ARTERY DISEASE INVOLVING NATIVE CORONARY ARTERY OF NATIVE HEART, UNSPECIFIED WHETHER ANGINA PRESENT: ICD-10-CM

## 2023-03-08 DIAGNOSIS — I50.20 HFREF (HEART FAILURE WITH REDUCED EJECTION FRACTION) (HCC): Primary | ICD-10-CM

## 2023-03-08 DIAGNOSIS — I10 PRIMARY HYPERTENSION: ICD-10-CM

## 2023-03-08 DIAGNOSIS — E78.5 HYPERLIPIDEMIA, UNSPECIFIED HYPERLIPIDEMIA TYPE: ICD-10-CM

## 2023-03-13 ENCOUNTER — HOSPITAL ENCOUNTER (OUTPATIENT)
Dept: RADIOLOGY | Facility: HOSPITAL | Age: 78
Discharge: HOME | End: 2023-03-13

## 2023-03-13 ENCOUNTER — OFFICE VISIT (OUTPATIENT)
Dept: PULMONOLOGY | Facility: CLINIC | Age: 78
End: 2023-03-13

## 2023-03-13 VITALS
WEIGHT: 206.6 LBS | HEIGHT: 68 IN | TEMPERATURE: 99.8 F | HEART RATE: 108 BPM | SYSTOLIC BLOOD PRESSURE: 107 MMHG | DIASTOLIC BLOOD PRESSURE: 55 MMHG | BODY MASS INDEX: 31.31 KG/M2 | OXYGEN SATURATION: 90 %

## 2023-03-13 DIAGNOSIS — J44.1 COPD WITH EXACERBATION (HCC): ICD-10-CM

## 2023-03-13 DIAGNOSIS — G47.33 OSA ON CPAP: ICD-10-CM

## 2023-03-13 DIAGNOSIS — J93.9 PNEUMOTHORAX ON RIGHT: ICD-10-CM

## 2023-03-13 DIAGNOSIS — J96.21 ACUTE ON CHRONIC RESPIRATORY FAILURE WITH HYPOXIA (HCC): ICD-10-CM

## 2023-03-13 DIAGNOSIS — J44.9 CHRONIC OBSTRUCTIVE PULMONARY DISEASE, UNSPECIFIED COPD TYPE (HCC): Primary | ICD-10-CM

## 2023-03-13 DIAGNOSIS — Z99.89 OSA ON CPAP: ICD-10-CM

## 2023-03-13 RX ORDER — FLUTICASONE PROPIONATE AND SALMETEROL 500; 50 UG/1; UG/1
1 POWDER RESPIRATORY (INHALATION) 2 TIMES DAILY
Qty: 180 BLISTER | Refills: 3 | Status: SHIPPED | OUTPATIENT
Start: 2023-03-13

## 2023-03-15 ENCOUNTER — APPOINTMENT (EMERGENCY)
Dept: RADIOLOGY | Facility: HOSPITAL | Age: 78
End: 2023-03-15

## 2023-03-15 ENCOUNTER — HOSPITAL ENCOUNTER (INPATIENT)
Facility: HOSPITAL | Age: 78
LOS: 2 days | Discharge: HOME | End: 2023-03-17
Attending: EMERGENCY MEDICINE | Admitting: INTERNAL MEDICINE

## 2023-03-15 DIAGNOSIS — R50.9 FEVER: Primary | ICD-10-CM

## 2023-03-15 DIAGNOSIS — R00.0 SINUS TACHYCARDIA: ICD-10-CM

## 2023-03-15 DIAGNOSIS — J18.9 PNEUMONIA: ICD-10-CM

## 2023-03-15 DIAGNOSIS — J44.9 CHRONIC OBSTRUCTIVE PULMONARY DISEASE, UNSPECIFIED COPD TYPE (HCC): ICD-10-CM

## 2023-03-15 DIAGNOSIS — J18.9 SEPSIS DUE TO PNEUMONIA (HCC): ICD-10-CM

## 2023-03-15 DIAGNOSIS — D72.829 LEUKOCYTOSIS: ICD-10-CM

## 2023-03-15 DIAGNOSIS — A41.9 SEPSIS DUE TO PNEUMONIA (HCC): ICD-10-CM

## 2023-03-15 LAB
2HR DELTA HS TROPONIN: 13 NG/L
4HR DELTA HS TROPONIN: 14 NG/L
ALBUMIN SERPL BCP-MCNC: 3.8 G/DL (ref 3.5–5)
ALP SERPL-CCNC: 75 U/L (ref 34–104)
ALT SERPL W P-5'-P-CCNC: 31 U/L (ref 7–52)
ANION GAP SERPL CALCULATED.3IONS-SCNC: 14 MMOL/L (ref 4–13)
APTT PPP: 28 SECONDS (ref 23–37)
AST SERPL W P-5'-P-CCNC: 30 U/L (ref 13–39)
BACTERIA UR QL AUTO: ABNORMAL /HPF
BASOPHILS # BLD AUTO: 0.05 THOUSANDS/ÂΜL (ref 0–0.1)
BASOPHILS NFR BLD AUTO: 0 % (ref 0–1)
BILIRUB SERPL-MCNC: 0.65 MG/DL (ref 0.2–1)
BILIRUB UR QL STRIP: NEGATIVE
BNP SERPL-MCNC: 114 PG/ML (ref 0–100)
BUN SERPL-MCNC: 13 MG/DL (ref 5–25)
CALCIUM SERPL-MCNC: 9.7 MG/DL (ref 8.4–10.2)
CARDIAC TROPONIN I PNL SERPL HS: 27 NG/L
CARDIAC TROPONIN I PNL SERPL HS: 40 NG/L
CARDIAC TROPONIN I PNL SERPL HS: 41 NG/L
CHLORIDE SERPL-SCNC: 95 MMOL/L (ref 96–108)
CLARITY UR: CLEAR
CO2 SERPL-SCNC: 31 MMOL/L (ref 21–32)
COLOR UR: YELLOW
CREAT SERPL-MCNC: 1.13 MG/DL (ref 0.6–1.3)
EOSINOPHIL # BLD AUTO: 0.15 THOUSAND/ÂΜL (ref 0–0.61)
EOSINOPHIL NFR BLD AUTO: 1 % (ref 0–6)
ERYTHROCYTE [DISTWIDTH] IN BLOOD BY AUTOMATED COUNT: 13.3 % (ref 11.6–15.1)
FINE GRAN CASTS URNS QL MICRO: ABNORMAL /LPF
FLUAV RNA RESP QL NAA+PROBE: NEGATIVE
FLUBV RNA RESP QL NAA+PROBE: NEGATIVE
GFR SERPL CREATININE-BSD FRML MDRD: 61 ML/MIN/1.73SQ M
GLUCOSE SERPL-MCNC: 124 MG/DL (ref 65–140)
GLUCOSE UR STRIP-MCNC: NEGATIVE MG/DL
HCT VFR BLD AUTO: 50.7 % (ref 36.5–49.3)
HGB BLD-MCNC: 15.8 G/DL (ref 12–17)
HGB UR QL STRIP.AUTO: ABNORMAL
HYALINE CASTS #/AREA URNS LPF: ABNORMAL /LPF
IMM GRANULOCYTES # BLD AUTO: 0.06 THOUSAND/UL (ref 0–0.2)
IMM GRANULOCYTES NFR BLD AUTO: 0 % (ref 0–2)
INR PPP: 1.03 (ref 0.84–1.19)
KETONES UR STRIP-MCNC: NEGATIVE MG/DL
L PNEUMO1 AG UR QL IA.RAPID: NEGATIVE
LACTATE SERPL-SCNC: 1.3 MMOL/L (ref 0.5–2)
LACTATE SERPL-SCNC: 3.6 MMOL/L (ref 0.5–2)
LEUKOCYTE ESTERASE UR QL STRIP: NEGATIVE
LYMPHOCYTES # BLD AUTO: 0.95 THOUSANDS/ÂΜL (ref 0.6–4.47)
LYMPHOCYTES NFR BLD AUTO: 6 % (ref 14–44)
MCH RBC QN AUTO: 32 PG (ref 26.8–34.3)
MCHC RBC AUTO-ENTMCNC: 31.2 G/DL (ref 31.4–37.4)
MCV RBC AUTO: 103 FL (ref 82–98)
MONOCYTES # BLD AUTO: 1.97 THOUSAND/ÂΜL (ref 0.17–1.22)
MONOCYTES NFR BLD AUTO: 12 % (ref 4–12)
NEUTROPHILS # BLD AUTO: 12.67 THOUSANDS/ÂΜL (ref 1.85–7.62)
NEUTS SEG NFR BLD AUTO: 81 % (ref 43–75)
NITRITE UR QL STRIP: NEGATIVE
NON-SQ EPI CELLS URNS QL MICRO: ABNORMAL /HPF
NRBC BLD AUTO-RTO: 0 /100 WBCS
PH UR STRIP.AUTO: 6 [PH]
PLATELET # BLD AUTO: 275 THOUSANDS/UL (ref 149–390)
PMV BLD AUTO: 10 FL (ref 8.9–12.7)
POTASSIUM SERPL-SCNC: 4 MMOL/L (ref 3.5–5.3)
PROCALCITONIN SERPL-MCNC: 0.17 NG/ML
PROT SERPL-MCNC: 7.5 G/DL (ref 6.4–8.4)
PROT UR STRIP-MCNC: NEGATIVE MG/DL
PROTHROMBIN TIME: 13.5 SECONDS (ref 11.6–14.5)
RBC # BLD AUTO: 4.93 MILLION/UL (ref 3.88–5.62)
RBC #/AREA URNS AUTO: ABNORMAL /HPF
RSV RNA RESP QL NAA+PROBE: NEGATIVE
S PNEUM AG UR QL: NEGATIVE
SARS-COV-2 RNA RESP QL NAA+PROBE: NEGATIVE
SODIUM SERPL-SCNC: 140 MMOL/L (ref 135–147)
SP GR UR STRIP.AUTO: 1.01
UROBILINOGEN UR QL STRIP.AUTO: 0.2 E.U./DL
WBC # BLD AUTO: 15.85 THOUSAND/UL (ref 4.31–10.16)
WBC #/AREA URNS AUTO: ABNORMAL /HPF

## 2023-03-15 RX ORDER — SODIUM CHLORIDE 9 MG/ML
75 INJECTION, SOLUTION INTRAVENOUS CONTINUOUS
Status: DISCONTINUED | OUTPATIENT
Start: 2023-03-15 | End: 2023-03-16

## 2023-03-15 RX ORDER — ONDANSETRON 2 MG/ML
4 INJECTION INTRAMUSCULAR; INTRAVENOUS EVERY 6 HOURS PRN
Status: DISCONTINUED | OUTPATIENT
Start: 2023-03-15 | End: 2023-03-17 | Stop reason: HOSPADM

## 2023-03-15 RX ORDER — FLUTICASONE PROPIONATE AND SALMETEROL 500; 50 UG/1; UG/1
1 POWDER RESPIRATORY (INHALATION) 2 TIMES DAILY
Status: DISCONTINUED | OUTPATIENT
Start: 2023-03-15 | End: 2023-03-17 | Stop reason: HOSPADM

## 2023-03-15 RX ORDER — METOPROLOL SUCCINATE 25 MG/1
12.5 TABLET, EXTENDED RELEASE ORAL 2 TIMES DAILY
Status: DISCONTINUED | OUTPATIENT
Start: 2023-03-15 | End: 2023-03-15

## 2023-03-15 RX ORDER — FLUTICASONE FUROATE AND VILANTEROL 200; 25 UG/1; UG/1
1 POWDER RESPIRATORY (INHALATION)
Status: DISCONTINUED | OUTPATIENT
Start: 2023-03-16 | End: 2023-03-15

## 2023-03-15 RX ORDER — LEVALBUTEROL INHALATION SOLUTION 1.25 MG/3ML
1.25 SOLUTION RESPIRATORY (INHALATION)
Status: DISCONTINUED | OUTPATIENT
Start: 2023-03-15 | End: 2023-03-17 | Stop reason: HOSPADM

## 2023-03-15 RX ORDER — ASPIRIN 81 MG/1
81 TABLET, CHEWABLE ORAL DAILY
Status: DISCONTINUED | OUTPATIENT
Start: 2023-03-16 | End: 2023-03-17 | Stop reason: HOSPADM

## 2023-03-15 RX ORDER — ACETAMINOPHEN 325 MG/1
650 TABLET ORAL ONCE
Status: COMPLETED | OUTPATIENT
Start: 2023-03-15 | End: 2023-03-15

## 2023-03-15 RX ORDER — ACETAMINOPHEN 325 MG/1
650 TABLET ORAL EVERY 4 HOURS PRN
Status: DISCONTINUED | OUTPATIENT
Start: 2023-03-15 | End: 2023-03-17 | Stop reason: HOSPADM

## 2023-03-15 RX ORDER — GABAPENTIN 300 MG/1
300 CAPSULE ORAL 2 TIMES DAILY
Status: DISCONTINUED | OUTPATIENT
Start: 2023-03-15 | End: 2023-03-17 | Stop reason: HOSPADM

## 2023-03-15 RX ORDER — FAMOTIDINE 20 MG/1
20 TABLET, FILM COATED ORAL
Status: DISCONTINUED | OUTPATIENT
Start: 2023-03-15 | End: 2023-03-17 | Stop reason: HOSPADM

## 2023-03-15 RX ORDER — ENOXAPARIN SODIUM 100 MG/ML
40 INJECTION SUBCUTANEOUS DAILY
Status: DISCONTINUED | OUTPATIENT
Start: 2023-03-16 | End: 2023-03-17 | Stop reason: HOSPADM

## 2023-03-15 RX ORDER — POTASSIUM CHLORIDE 20 MEQ/1
20 TABLET, EXTENDED RELEASE ORAL DAILY
Status: DISCONTINUED | OUTPATIENT
Start: 2023-03-16 | End: 2023-03-17

## 2023-03-15 RX ORDER — CEFTRIAXONE 2 G/50ML
2000 INJECTION, SOLUTION INTRAVENOUS EVERY 24 HOURS
Status: DISCONTINUED | OUTPATIENT
Start: 2023-03-16 | End: 2023-03-15

## 2023-03-15 RX ORDER — GUAIFENESIN 600 MG/1
600 TABLET, EXTENDED RELEASE ORAL 2 TIMES DAILY
Status: DISCONTINUED | OUTPATIENT
Start: 2023-03-15 | End: 2023-03-17 | Stop reason: HOSPADM

## 2023-03-15 RX ORDER — ATORVASTATIN CALCIUM 80 MG/1
80 TABLET, FILM COATED ORAL
Status: DISCONTINUED | OUTPATIENT
Start: 2023-03-15 | End: 2023-03-17 | Stop reason: HOSPADM

## 2023-03-15 RX ORDER — ALBUTEROL SULFATE 2.5 MG/3ML
2.5 SOLUTION RESPIRATORY (INHALATION) EVERY 6 HOURS PRN
Status: DISCONTINUED | OUTPATIENT
Start: 2023-03-15 | End: 2023-03-17 | Stop reason: HOSPADM

## 2023-03-15 RX ORDER — CEFEPIME HYDROCHLORIDE 2 G/50ML
2000 INJECTION, SOLUTION INTRAVENOUS EVERY 12 HOURS
Status: DISCONTINUED | OUTPATIENT
Start: 2023-03-16 | End: 2023-03-17 | Stop reason: HOSPADM

## 2023-03-15 RX ORDER — CEFEPIME HYDROCHLORIDE 2 G/50ML
2000 INJECTION, SOLUTION INTRAVENOUS ONCE
Status: COMPLETED | OUTPATIENT
Start: 2023-03-15 | End: 2023-03-15

## 2023-03-15 RX ADMIN — CEFEPIME HYDROCHLORIDE 2000 MG: 2 INJECTION, SOLUTION INTRAVENOUS at 16:41

## 2023-03-15 RX ADMIN — SODIUM CHLORIDE 75 ML/HR: 0.9 INJECTION, SOLUTION INTRAVENOUS at 19:49

## 2023-03-15 RX ADMIN — ATORVASTATIN CALCIUM 80 MG: 80 TABLET, FILM COATED ORAL at 20:10

## 2023-03-15 RX ADMIN — ACETAMINOPHEN 650 MG: 325 TABLET ORAL at 16:41

## 2023-03-15 RX ADMIN — FAMOTIDINE 20 MG: 20 TABLET, FILM COATED ORAL at 21:51

## 2023-03-15 RX ADMIN — VANCOMYCIN HYDROCHLORIDE 1500 MG: 1 INJECTION, POWDER, LYOPHILIZED, FOR SOLUTION INTRAVENOUS at 17:16

## 2023-03-15 RX ADMIN — SODIUM CHLORIDE 1000 ML: 0.9 INJECTION, SOLUTION INTRAVENOUS at 17:20

## 2023-03-15 RX ADMIN — SODIUM CHLORIDE 1000 ML: 0.9 INJECTION, SOLUTION INTRAVENOUS at 17:19

## 2023-03-15 RX ADMIN — GUAIFENESIN 600 MG: 600 TABLET ORAL at 20:10

## 2023-03-15 RX ADMIN — SODIUM CHLORIDE 1000 ML: 0.9 INJECTION, SOLUTION INTRAVENOUS at 18:05

## 2023-03-15 RX ADMIN — SODIUM CHLORIDE 500 ML: 0.9 INJECTION, SOLUTION INTRAVENOUS at 16:40

## 2023-03-15 RX ADMIN — FLUTICASONE PROPIONATE AND SALMETEROL 1 PUFF: 500; 50 POWDER RESPIRATORY (INHALATION) at 22:20

## 2023-03-15 RX ADMIN — LEVALBUTEROL HYDROCHLORIDE 1.25 MG: 1.25 SOLUTION RESPIRATORY (INHALATION) at 20:53

## 2023-03-15 NOTE — ED NOTES
Sepsis alert called by dr Lyles [facility]       Maged Sanchez RN  03/15/23 [id number]

## 2023-03-15 NOTE — ASSESSMENT & PLAN NOTE
Okay to resume CPAP barring no recurrent pneumothorax on repeat chest x-ray  We did touch on the subject of inspire during today's office visit however I do not think patient is a good candidate for this and instead should continue CPAP during all hours of sleep  He verbalized understanding and agrees to this plan

## 2023-03-15 NOTE — ED NOTES
This RN rechecking and repositioning blood pressure cuff as bp showing systolic in the 34Q  Patient currently on cell phone with arm elevated in which BP cuff was taken  Blood pressure remains within range as previous reading  Patient remains asymptomatic         Vannesa Pitts RN  03/15/23 [id number]

## 2023-03-15 NOTE — ASSESSMENT & PLAN NOTE
Noted hypoxia on 3 L pulsed dose nasal cannula today in the office  Improved on 4 L continuously  Recommended increasing to 4 L for the time being and rechecking chest x-ray  Inhalers as above  She knows to go to the hospital for worsening respiratory symptoms/worsening hypoxia despite increased FiO2

## 2023-03-15 NOTE — PROGRESS NOTES
Pulmonary Follow Up Note   Chai Lake 65 y o  male MRN: [id number]  3/15/2023      Assessment:    COPD (chronic obstructive pulmonary disease) ([facility])  Recommend resuming Wixela Inhub 500/50 mcg 1 puff twice daily  He was reminded rinse his mouth out after each use  Continue Spiriva Respimat 2 puffs daily  Discontinue budesonide and Perforomist   Continue albuterol HFA/nebs every 6 hours as needed  In regards to preoperative pulmonary clearance, patient is not yet optimized to undergo anesthesia at this time  I would recommend deferring for several weeks/1 month and then reassessing  I discussed risks of generalized anesthesia/procedure with him and his family in detail and they verbalized understanding and agreement  Also reached  reached out to urology office to let them know as well  Pneumothorax on right  Repeat chest x-ray to ensure no recurrent pneumothorax or other acute pulmonary issue  Acute on chronic respiratory failure with hypoxia ([facility])  Noted hypoxia on 3 L pulsed dose nasal cannula today in the office  Improved on 4 L continuously  Recommended increasing to 4 L for the time being and rechecking chest x-ray  Inhalers as above  She knows to go to the hospital for worsening respiratory symptoms/worsening hypoxia despite increased FiO2  LAMBERTO on CPAP  Okay to resume CPAP barring no recurrent pneumothorax on repeat chest x-ray  We did touch on the subject of inspire during today's office visit however I do not think patient is a good candidate for this and instead should continue CPAP during all hours of sleep  He verbalized understanding and agrees to this plan  Plan:    Diagnoses and all orders for this visit:    Chronic obstructive pulmonary disease, unspecified COPD type ([facility] )  -     Fluticasone-Salmeterol (Wixela Inhub) 500-50 mcg/dose inhaler; Inhale 1 puff 2 (two) times a day Rinse mouth after use      Pneumothorax on right    Acute on chronic respiratory failure with hypoxia ([facility] )    LAMBERTO on CPAP        Return for Next scheduled follow up  History of Present Illness   HPI:  Gustavo Almanza is a 65 y o  male who presents the office today for hospital follow-up and preop clearance  Patient has a history of COPD, LAMBERTO on CPAP and chronic hypoxic respiratory failure was recently admitted at [facility] for iatrogenic pneumothorax Requiring chest tube placement  Patient reports 2/19 having right peripheral intercostal nerve block  After he was discharged she began to experience severe right-sided chest pain associated shortness of breath prompting ED visit where he was found to have a right pneumothorax  Chest tube was placed with resolution of pneumothorax  Also treated for COPD exacerbation  Currently patient reports that he is not feeling like he is at his baseline  Still has dyspnea with minimal activities  When he entered the office today he was saturating 83% on 3 L pulsed dose antigen  Improved on 4 L continuous on our supplemental oxygen in house  Patient feels like his nebulizers do not work as well as his old inhalers did  Currently he is on budesonide and albuterol  He and his family do not recall using Perforomist though it is listed in his chart  He used to be on [place] [facility]  Urology has plans to place [employer] hydrogel requires pulmonary clearance  At this time patient has very severe risk factors to undergo anesthesia  Would advise delaying 1 month and reassessing afterwards  Review of Systems   All other systems reviewed and are negative        Historical Information   Past Medical History:   Diagnosis Date   • Bilateral carotid artery stenosis    • Cancer (HCC)     skin   • Chronic diastolic heart failure (HCC)    • Chronic ischemic heart disease    • Chronic kidney disease     stage 3   • Colon polyp    • COPD (chronic obstructive pulmonary disease) (HCC)    • Coronary artery disease     hx stents, MI, PCI   • COVID 11/2021   • CPAP (continuous positive airway pressure) dependence    • Emphysema of lung ([facility] ) 1995    started   • Hearing loss    • History of transfusion 1995   • Hyperlipidemia    • Hypertension    • Lung nodule 2023    x rays   • MI (myocardial infarction) ([facility] ) 1995   • Myocardial infarction ([facility] ) 1995   • Pneumonia    • Pneumothorax feb 20,2023    collapsed lung   • Prostate cancer ([facility] [address] )    • RSV (respiratory syncytial virus infection) 10/2022   • S/P carotid endarterectomy    • Shortness of breath     O2 2 l/nc PRN   • Sleep apnea    • Sleep apnea, obstructive    • Stented coronary artery      Past Surgical History:   Procedure Laterality Date   • APPENDECTOMY     • CARDIAC CATHETERIZATION  03/18/2021    left main with no significant disease, proximal LAD with 10% stenosis at the site of prior stent, left circumflex artery with minimal luminal irregularities mid RCA with 50% stenosis at site of prior stent and PL segment with distal disease supplied by collaterals from the distal circumflex with no significant CAD requiring revascularization at that time     • CATARACT EXTRACTION, BILATERAL Bilateral    • COLONOSCOPY     • CORONARY ANGIOPLASTY WITH STENT PLACEMENT  1999    RCA   • CORONARY ANGIOPLASTY WITH STENT PLACEMENT  2001    RCA   • CORONARY ANGIOPLASTY WITH STENT PLACEMENT  2003    LAD   • EYE SURGERY     • HI BX PROSTATE STRTCTC SATURATION SAMPLING IMG GID N/A 09/13/2022    Procedure: TRANSPERINEAL MRI FUSION  BIOPSY PROSTATE;  Surgeon: Sharyn Omer MD;  Location: [facility];  Service: Urology   • HI NEUROPLASTY &/TRANSPOS MEDIAN NRV Zoë Doeor Left 07/22/2022    Procedure: RELEASE CARPAL TUNNEL;  Surgeon: Giorgio Carias MD;  Location: [facility];  Service: Orthopedics   • HI NEUROPLASTY &/TRANSPOSITION ULNAR NERVE ELBOW Left 07/22/2022    Procedure: RELEASE CUBITAL TUNNEL;  Surgeon: Giorgio Carias MD;  Location: [facility];  Service: Orthopedics   • HI NEUROPLASTY &/TRANSPOSITION ULNAR NERVE WRIST Left 2022    Procedure: Mozell Cargo;  Surgeon: Susana Solis MD;  Location: [facility];  Service: Orthopedics   • SKIN CANCER EXCISION  2012    chin-per pt, basal cell  also right ankle   • TONSILLECTOMY  no     Family History   Problem Relation Age of Onset   • Heart attack Mother    • Dementia Mother    • Heart failure Mother             • No Known Problems Father        Social History     Tobacco Use   Smoking Status Former   • Packs/day: 2 00   • Years: 35 00   • Pack years: 70 00   • Types: Cigarettes   • Start date: 1960   • Quit date: 1995   • Years since quittin 7   Smokeless Tobacco Never   Tobacco Comments    stopped          Meds/Allergies   No current facility-administered medications for this visit      Current Outpatient Medications:   •  albuterol (2 5 mg/3 mL) 0 083 % nebulizer solution, Take 3 mL (2 5 mg total) by nebulization every 6 (six) hours as needed for wheezing or shortness of breath, Disp: 60 mL, Rfl: 5  •  aspirin 81 mg chewable tablet, CHEW ONE TABLET BY MOUTH EVERY DAY, Disp: , Rfl:   •  azithromycin (ZITHROMAX) 500 MG tablet, Take 1 tablet (500 mg total) by mouth 3 (three) times a week, Disp: 12 tablet, Rfl: 2  •  Blood Pressure Monitor KIT, Use daily (Patient taking differently: Use 40 mg daily), Disp: 1 kit, Rfl: 0  •  dextromethorphan-guaiFENesin (ROBITUSSIN DM)  mg/5 mL syrup, Take 5 mL by mouth 3 (three) times a day as needed for cough, Disp: 354 mL, Rfl: 0  •  famotidine (PEPCID) 20 mg tablet, Take 1 tablet (20 mg total) by mouth daily at bedtime, Disp: 100 tablet, Rfl: 1  •  fluticasone (FLONASE) 50 mcg/act nasal spray, into each nostril as needed , Disp: , Rfl:   •  Fluticasone-Salmeterol (Wixela Inhub) 500-50 mcg/dose inhaler, Inhale 1 puff 2 (two) times a day Rinse mouth after use , Disp: 180 blister, Rfl: 3  •  furosemide (LASIX) 40 mg tablet, Take 1 tablet (40 mg total) by mouth daily, Disp: 100 tablet, Rfl: 3  •  gabapentin (NEURONTIN) 300 mg capsule, Take 1 capsule (300 mg total) by mouth 2 (two) times a day, Disp: , Rfl:   •  metoprolol succinate (TOPROL-XL) 25 mg 24 hr tablet, Take 0 5 tablets (12 5 mg total) by mouth 2 (two) times a day, Disp: 90 tablet, Rfl: 0  •  oxygen gas, Inhale 2 L/min continuous   2LPM at rest and 3LPM with activity per D Cedric FANG notes  Indications: SOB, pulmonary edema suspected, Disp: , Rfl:   •  potassium chloride (Klor-Con) 10 mEq tablet, Take 2 tablets (20 mEq total) by mouth daily, Disp: 200 tablet, Rfl: 3  •  rosuvastatin (CRESTOR) 40 MG tablet, TAKE 1 TABLET DAILY (Patient taking differently: Take 40 mg by mouth daily at bedtime), Disp: 100 tablet, Rfl: 3  •  Tiotropium Bromide Monohydrate (SPIRIVA RESPIMAT IN), Inhale every morning, Disp: , Rfl:     Facility-Administered Medications Ordered in Other Visits:   •  acetaminophen (TYLENOL) tablet 650 mg, 650 mg, Oral, Q4H PRN, Gita Wilcox PA-C  •  albuterol inhalation solution 2 5 mg, 2 5 mg, Nebulization, Q6H PRN, Gita Wilcox PA-C  •  [START ON 3/16/2023] aspirin chewable tablet 81 mg, 81 mg, Oral, Daily, Hector Caldwell PA-C  •  atorvastatin (LIPITOR) tablet 80 mg, 80 mg, Oral, Daily With Dinner, Gita Wilcox PA-C  •  azithromycin (ZITHROMAX) 500 mg in sodium chloride 0 9 % 250 mL IVPB, 500 mg, Intravenous, Q24H, Gita Wilcox PA-C  •  [START ON 3/16/2023] cefTRIAXone (ROCEPHIN) IVPB (premix in dextrose) 2,000 mg 50 mL, 2,000 mg, Intravenous, Q24H, Hector Caldwell PA-C  •  [START ON 3/16/2023] enoxaparin (LOVENOX) subcutaneous injection 40 mg, 40 mg, Subcutaneous, Daily, Hector Caldwell PA-C  •  famotidine (PEPCID) tablet 20 mg, 20 mg, Oral, HS, Hector Caldwell PA-C  •  [START ON 3/16/2023] fluticasone-vilanterol 200-25 mcg/actuation 1 puff, 1 puff, Inhalation, Daily, Hector Caldwell PA-C  •  gabapentin (NEURONTIN) capsule 300 mg, 300 mg, Oral, BID, Hector Caldwell PA-C  •  guaiFENesin (MUCINEX) 12 hr tablet 600 mg, 600 mg, Oral, BID, Damon Ragland PA-C  •  ipratropium (ATROVENT HFA) inhaler 1 puff, 1 puff, Inhalation, Q12H [address], Hector Caldwell PA-C  •  metoprolol succinate (TOPROL-XL) 24 hr tablet 12 5 mg, 12 5 mg, Oral, BID, Hector Caldwell PA-C  •  ondansetron (ZOFRAN) injection 4 mg, 4 mg, Intravenous, Q6H PRN, Damon Ragland PA-C  •  [START ON 3/16/2023] potassium chloride (K-DUR,KLOR-CON) CR tablet 20 mEq, 20 mEq, Oral, Daily, Damon Ragland PA-C  •  sodium chloride 0 9 % bolus 1,000 mL, 1,000 mL, Intravenous, Once, Last Rate: 2,000 mL/hr at 03/15/23 1720, 1,000 mL at 03/15/23 1720 **FOLLOWED BY** sodium chloride 0 9 % bolus 1,000 mL, 1,000 mL, Intravenous, Once, Last Rate: 2,000 mL/hr at 03/15/23 1720, Restarted at 03/15/23 1720 **FOLLOWED BY** sodium chloride 0 9 % bolus 1,000 mL, 1,000 mL, Intravenous, Once, Danis Kettle III, DO  •  sodium chloride 0 9 % bolus 500 mL, 500 mL, Intravenous, Once, Danis Kettle III, DO, Last Rate: 250 mL/hr at 03/15/23 1640, 500 mL at 03/15/23 1640  •  sodium chloride 0 9 % infusion, 75 mL/hr, Intravenous, Continuous, Hector Caldwell PA-C  •  vancomycin (VANCOCIN) 1500 mg in sodium chloride 0 9% 250 mL IVPB, 15 mg/kg, Intravenous, Once, Danis Kettle III, DO, 1,500 mg at 03/15/23 1716  Allergies   Allergen Reactions   • Lisinopril Swelling and Cough   • Tetanus Antitoxin Anaphylaxis   • Tetanus Toxoid Anaphylaxis and Swelling       Vitals: Blood pressure 107/55, pulse (!) 108, temperature 99 8 °F (37 7 °C), temperature source Tympanic, height 5' 8" (1 727 m), weight 93 7 kg (206 lb 9 6 oz), SpO2 90 %  Body mass index is 31 41 kg/m²  Oxygen Therapy  SpO2: 90 %  Oxygen Therapy: Supplemental oxygen  O2 Delivery Method: Nasal cannula  O2 Flow Rate (L/min): 4 L/min    Physical Exam  Physical Exam  Vitals reviewed  Constitutional:       Appearance: Normal appearance  He is well-developed  HENT:      Head: Normocephalic and atraumatic        Nose: Nose normal       Mouth/Throat: Mouth: Mucous membranes are moist    Eyes:      Extraocular Movements: Extraocular movements intact  Cardiovascular:      Rate and Rhythm: Normal rate and regular rhythm  Heart sounds: Normal heart sounds  Pulmonary:      Effort: Pulmonary effort is normal  No respiratory distress  Breath sounds: No wheezing, rhonchi or rales  Musculoskeletal:         General: No swelling  Cervical back: Normal range of motion and neck supple  Skin:     General: Skin is warm and dry  Neurological:      General: No focal deficit present  Mental Status: He is alert  Mental status is at baseline  Psychiatric:         Mood and Affect: Mood normal          Behavior: Behavior normal          Labs: I have personally reviewed pertinent lab results  , ABG: No results found for: PHART, VEB2NZL, PO2ART, DUM9JSG, U0WLIETZ, BEART, SOURCE, BNP:   Lab Results   Component Value Date     (H) 03/15/2023   , CBC:   Lab Results   Component Value Date    WBC 15 85 (H) 03/15/2023    HGB 15 8 03/15/2023    HCT 50 7 (H) 03/15/2023     (H) 03/15/2023     03/15/2023    MCH 32 0 03/15/2023    MCHC 31 2 (L) 03/15/2023    RDW 13 3 03/15/2023    MPV 10 0 03/15/2023    NRBC 0 03/15/2023   , CMP:   Lab Results   Component Value Date    SODIUM 140 03/15/2023    K 4 0 03/15/2023    CL 95 (L) 03/15/2023    CO2 31 03/15/2023    BUN 13 03/15/2023    CREATININE 1 13 03/15/2023    CALCIUM 9 7 03/15/2023    AST 30 03/15/2023    ALT 31 03/15/2023    ALKPHOS 75 03/15/2023    EGFR 61 03/15/2023   , PT/INR:   Lab Results   Component Value Date    INR 1 03 03/15/2023   , Troponin: No results found for: TROPONINI  Lab Results   Component Value Date    WBC 15 85 (H) 03/15/2023    HGB 15 8 03/15/2023    HCT 50 7 (H) 03/15/2023     (H) 03/15/2023     03/15/2023     Lab Results   Component Value Date    CALCIUM 9 7 03/15/2023    K 4 0 03/15/2023    CO2 31 03/15/2023    CL 95 (L) 03/15/2023    BUN 13 03/15/2023 CREATININE 1 13 03/15/2023     Lab Results   Component Value Date    IGE 88 1 08/29/2022     Lab Results   Component Value Date    ALT 31 03/15/2023    AST 30 03/15/2023    ALKPHOS 75 03/15/2023

## 2023-03-15 NOTE — ASSESSMENT & PLAN NOTE
Lab Results   Component Value Date    EGFR 61 03/15/2023    EGFR 72 02/23/2023    EGFR 66 02/22/2023    CREATININE 1 13 03/15/2023    CREATININE 0 99 02/23/2023    CREATININE 1 07 02/22/2023     · Creatinine appears to be at baseline  · Continue to monitor

## 2023-03-15 NOTE — ASSESSMENT & PLAN NOTE
· Patient increased chronic 2 L O2 to 4 L O2   Patient with RR of 26 on arrival to ED  · Please see plan for "Sepsis due to pneumonia"

## 2023-03-15 NOTE — H&P
Sherry 44  H&P- Mario Burns 1945, 65 y o  male MRN: [id number]  Unit/Bed#: ED 02 Encounter: [id number]  Primary Care Provider: Ana Maier DO   Date and time admitted to hospital: 3/15/2023  4:22 PM    * Sepsis due to pneumonia [facility])  Assessment & Plan  · Present on admission with leukocytosis, tachycardia, tachypnea, lactic acid of 3 6 in the setting of pneumonia  · CXR- pending  · COVID/Flu/RSV- negative  · procalcitonin 0 17, repeat in am  · Patient received IV Cefepime/Vancomycin in the ED, will continue for now  · Patient received IV fluids per sepsis protocol  · Continue IV fluids   · Check MRSA nasal Cx, sputum cx, Strep pneumonia and legionella urinary antigens  · Blood Cx- pending  · Consult pulmonology  · Labs in am    Acute on chronic respiratory failure with hypoxia (HCC)  Assessment & Plan  · Patient increased chronic 2 L O2 to 4 L O2   Patient with RR of 26 on arrival to ED  · Please see plan for "Sepsis due to pneumonia"    COPD (chronic obstructive pulmonary disease) ([facility] [address] )  Assessment & Plan  · Without acute exacerbation   · Patient's family to bring in home meds Spiriva and Wixela     Stage 3a chronic kidney disease [facility])  Assessment & Plan  Lab Results   Component Value Date    EGFR 61 03/15/2023    EGFR 72 02/23/2023    EGFR 66 02/22/2023    CREATININE 1 13 03/15/2023    CREATININE 0 99 02/23/2023    CREATININE 1 07 02/22/2023     · Creatinine appears to be at baseline  · Continue to monitor    LAMBERTO on CPAP  Assessment & Plan  · Family to bring in CPAP from home    Hypertension  Assessment & Plan  · Blood pressure low   · Hold home regimen of Toprol   · Continue IV fluids    Coronary artery disease involving native coronary artery of native heart without angina pectoris  Assessment & Plan  · Continue statin and aspirin  · Holding Toprol given low BP    VTE Pharmacologic Prophylaxis: VTE Score: 9 High Risk (Score >/= 5) - Pharmacological DVT Prophylaxis Ordered: enoxaparin (Lovenox)  Sequential Compression Devices Ordered  Code Status: Level 1 - Full Code   Discussion with family: Updated  (wife and daughter) at bedside  Anticipated Length of Stay: Patient will be admitted on an inpatient basis with an anticipated length of stay of greater than 2 midnights secondary to treatment of pneumonia/sepsis  Total Time Spent on Date of Encounter in care of patient: 65 minutes This time was spent on one or more of the following: performing physical exam; counseling and coordination of care; obtaining or reviewing history; documenting in the medical record; reviewing/ordering tests, medications or procedures; communicating with other healthcare professionals and discussing with patient's family/caregivers  Chief Complaint: shortness of breath, fevers    History of Present Illness:  Martin Rivera is a 65 y o  male with a PMH of COPD, chronic respiratory failure, HTN, prostate cancer, LAMBERTO who presents with a complaint of shortness of breath  Patient reports increased shortness of breath for last 2 days  He reports being on chronic oxygen 2 L which she had increased to 4 L over the last 2 days  He reports a productive cough with thick yellowish-green sputum  She reports fevers and chills at home  Chest x-ray from the ED still pending read however ED attending was concerned for pneumonia  The patient was noted to meet sepsis criteria with a leukocytosis of 15,000, a lactic acidosis of 3 6, tachycardia, and tachypnea  Patient's blood pressure was also noted to be low with a systolic blood pressure in the 90s  The patient denies any headaches, chest pain, abdominal pain, nausea/vomiting, or diarrhea  Review of Systems:  Review of Systems   Constitutional: Positive for chills and fever  Respiratory: Positive for cough and shortness of breath  All other systems reviewed and are negative        Past Medical and Surgical History:   Past Medical History:   Diagnosis Date   • Bilateral carotid artery stenosis    • Cancer (HCC)     skin   • Chronic diastolic heart failure (HCC)    • Chronic ischemic heart disease    • Chronic kidney disease     stage 3   • Colon polyp    • COPD (chronic obstructive pulmonary disease) (HCC)    • Coronary artery disease     hx stents, MI, PCI   • COVID 11/2021   • CPAP (continuous positive airway pressure) dependence    • Emphysema of lung ([facility] [address] ) 1995 started   • Hearing loss    • History of transfusion 1995   • Hyperlipidemia    • Hypertension    • Lung nodule 2023    x rays   • MI (myocardial infarction) ([facility] [address] ) 1995   • Myocardial infarction ([facility] [address] ) 1995   • Pneumonia    • Pneumothorax feb 20,2023    collapsed lung   • Prostate cancer ([facility] [address] )    • RSV (respiratory syncytial virus infection) 10/2022   • S/P carotid endarterectomy    • Shortness of breath     O2 2 l/nc PRN   • Sleep apnea    • Sleep apnea, obstructive    • Stented coronary artery        Past Surgical History:   Procedure Laterality Date   • APPENDECTOMY     • CARDIAC CATHETERIZATION  03/18/2021    left main with no significant disease, proximal LAD with 10% stenosis at the site of prior stent, left circumflex artery with minimal luminal irregularities mid RCA with 50% stenosis at site of prior stent and PL segment with distal disease supplied by collaterals from the distal circumflex with no significant CAD requiring revascularization at that time     • CATARACT EXTRACTION, BILATERAL Bilateral    • COLONOSCOPY     • CORONARY ANGIOPLASTY WITH STENT PLACEMENT  1999    RCA   • CORONARY ANGIOPLASTY WITH STENT PLACEMENT  2001    RCA   • CORONARY ANGIOPLASTY WITH STENT PLACEMENT  2003    LAD   • EYE SURGERY     • [place] BX PROSTATE STRTCTC SATURATION SAMPLING IMG GID N/A 09/13/2022    Procedure: TRANSPERINEAL MRI FUSION  BIOPSY PROSTATE;  Surgeon: Gayathri Adams MD;  Location: [facility];  Service: Urology   • [place] NEUROPLASTY &/TRANSPOS MEDIAN NRV CARPAL Devon Able Left 07/22/2022 Procedure: RELEASE CARPAL TUNNEL;  Surgeon: Tasha Soria MD;  Location: [facility];  Service: Orthopedics   • AR NEUROPLASTY &/TRANSPOSITION ULNAR NERVE ELBOW Left 07/22/2022    Procedure: RELEASE CUBITAL TUNNEL;  Surgeon: Tasha Soria MD;  Location[facility];  Service: Orthopedics   • AR NEUROPLASTY &/TRANSPOSITION ULNAR NERVE WRIST Left 07/22/2022    Procedure: Danette Brightwood RELEASE;  Surgeon: Tasha Soria MD;  Location: [facility];  Service: Orthopedics   • SKIN CANCER EXCISION  2012    chin-per pt, basal cell  also right ankle   • TONSILLECTOMY  no       Meds/Allergies:  Prior to Admission medications    Medication Sig Start Date End Date Taking? Authorizing Provider   albuterol (2 5 mg/3 mL) 0 083 % nebulizer solution Take 3 mL (2 5 mg total) by nebulization every 6 (six) hours as needed for wheezing or shortness of breath 3/7/23   INOCENTE Mcdaniel   aspirin 81 mg chewable tablet CHEW ONE TABLET BY MOUTH EVERY DAY    Historical Provider, MD   azithromycin (ZITHROMAX) 500 MG tablet Take 1 tablet (500 mg total) by mouth 3 (three) times a week 2/15/23 5/16/23  David Green DO   Blood Pressure Monitor KIT Use daily  Patient taking differently: Use 40 mg daily 11/2/21   David Green DO   dextromethorphan-guaiFENesin (ROBITUSSIN DM)  mg/5 mL syrup Take 5 mL by mouth 3 (three) times a day as needed for cough 2/13/23   Manny Steele PA-C   famotidine (PEPCID) 20 mg tablet Take 1 tablet (20 mg total) by mouth daily at bedtime 2/14/23   David Green DO   fluticasone [facility]) 50 mcg/act nasal spray into each nostril as needed     Historical Provider, MD   Fluticasone-Salmeterol (Wixela Inhub) 500-50 mcg/dose inhaler Inhale 1 puff 2 (two) times a day Rinse mouth after use   3/13/23   Manny Steele PA-C   furosemide (LASIX) 40 mg tablet Take 1 tablet (40 mg total) by mouth daily 1/20/22   Real Peter, DO   gabapentin (NEURONTIN) 300 mg capsule Take 1 capsule (300 mg total) by mouth 2 (two) times a day 23   Zana Jasmine DO   metoprolol succinate (TOPROL-XL) 25 mg 24 hr tablet Take 0 5 tablets (12 5 mg total) by mouth 2 (two) times a day 23   Talia Ferrer DO   oxygen gas Inhale 2 L/min continuous  2LPM at rest and 3LPM with activity per D Cedric FANG notes  Indications: SOB, pulmonary edema suspected    Historical Provider, MD   potassium chloride (Klor-Con) 10 mEq tablet Take 2 tablets (20 mEq total) by mouth daily 22   Talia Ferrer DO   rosuvastatin (CRESTOR) 40 MG tablet TAKE 1 TABLET DAILY  Patient taking differently: Take 40 mg by mouth daily at bedtime 22   Talia Ferrer DO   Tiotropium Bromide Monohydrate ([address]) Inhale every morning    Historical Provider, MD   Ascorbic Acid (vitamin C) 1000 MG tablet Take 1,000 mg by mouth daily  Patient not taking: No sig reported  10/15/22  Historical Provider, MD   methocarbamol (ROBAXIN) 500 mg tablet Take 1 tablet (500 mg total) by mouth 3 (three) times a day as needed for muscle spasms  Patient not taking: No sig reported 4/27/22 10/15/22  Talia Ferrer DO     I have reviewed home medications with patient personally  Allergies:    Allergies   Allergen Reactions   • Lisinopril Swelling and Cough   • Tetanus Antitoxin Anaphylaxis   • Tetanus Toxoid Anaphylaxis and Swelling       Social History:  Marital Status: /Civil Union   Occupation: retired   Patient Pre-hospital Living Situation: Home  Patient Pre-hospital Level of Mobility: walks  Patient Pre-hospital Diet Restrictions: none  Substance Use History:   Social History     Substance and Sexual Activity   Alcohol Use Never     Social History     Tobacco Use   Smoking Status Former   • Packs/day: 2 00   • Years: 35 00   • Pack years: 70 00   • Types: Cigarettes   • Start date: 1960   • Quit date: 1995   • Years since quittin 7   Smokeless Tobacco Never   Tobacco Comments    stopped [address] History     Substance and Sexual Activity   Drug Use Never       Family History:  Family History   Problem Relation Age of Onset   • Heart attack Mother    • Dementia Mother    • Heart failure Mother             • No Known Problems Father        Physical Exam:     Vitals:   Blood Pressure: 93/53 (03/15/23 1800)  Pulse: (!) 114 (03/15/23 1800)  Temperature: (!) 101 °F (38 3 °C) (03/15/23 172)  Temp Source: Tympanic (03/15/23 172)  Respirations: 20 (03/15/23 1800)  SpO2: 93 % (03/15/23 1821)    Physical Exam  Vitals and nursing note reviewed  Constitutional:       General: He is awake  Appearance: He is morbidly obese  He is ill-appearing  Interventions: Nasal cannula in place  HENT:      Head: Normocephalic and atraumatic  Cardiovascular:      Rate and Rhythm: Regular rhythm  Tachycardia present  Heart sounds: Normal heart sounds  Pulmonary:      Effort: Pulmonary effort is normal       Breath sounds: Decreased air movement present  Abdominal:      Palpations: Abdomen is soft  Tenderness: There is no abdominal tenderness  Skin:     General: Skin is warm and dry  Neurological:      General: No focal deficit present  Mental Status: He is alert and oriented to person, place, and time  Psychiatric:         Attention and Perception: Attention normal          Mood and Affect: Mood normal          Speech: Speech normal          Behavior: Behavior normal  Behavior is cooperative            Additional Data:     Lab Results:  Results from last 7 days   Lab Units 03/15/23  1637   WBC Thousand/uL 15 85*   HEMOGLOBIN g/dL 15 8   HEMATOCRIT % 50 7*   PLATELETS Thousands/uL 275   NEUTROS PCT % 81*   LYMPHS PCT % 6*   MONOS PCT % 12   EOS PCT % 1     Results from last 7 days   Lab Units 03/15/23  1637   SODIUM mmol/L 140   POTASSIUM mmol/L 4 0   CHLORIDE mmol/L 95*   CO2 mmol/L 31   BUN mg/dL 13   CREATININE mg/dL 1 13   ANION GAP mmol/L 14*   CALCIUM mg/dL 9 7   ALBUMIN g/dL 3 8 TOTAL BILIRUBIN mg/dL 0 65   ALK PHOS U/L 75   ALT U/L 31   AST U/L 30   GLUCOSE RANDOM mg/dL 124     Results from last 7 days   Lab Units 03/15/23  1637   INR  1 03             Results from last 7 days   Lab Units 03/15/23  1637   LACTIC ACID mmol/L 3 6*   PROCALCITONIN ng/ml 0 17       Lines/Drains:  Invasive Devices     Peripheral Intravenous Line  Duration           Peripheral IV 03/15/23 Distal;Right;Upper;Ventral (anterior) Arm <1 day    Peripheral IV 03/15/23 Left;Ventral (anterior) Forearm <1 day    Peripheral IV 03/15/23 Right Hand <1 day                    Imaging: Reviewed radiology reports from this admission including: chest xray  XR chest 1 view portable   ED Interpretation by [address] [place] [address] Way, DO (03/15 1650)   X-ray, portable, right-sided infiltrate  EKG and Other Studies Reviewed on Admission:   · EKG: Sinus Tachycardia    ** Please Note: This note has been constructed using a voice recognition system   **

## 2023-03-15 NOTE — ASSESSMENT & PLAN NOTE
Recommend resuming Wixela Inhub 500/50 mcg 1 puff twice daily  He was reminded rinse his mouth out after each use  Continue Spiriva Respimat 2 puffs daily  Discontinue budesonide and Perforomist   Continue albuterol HFA/nebs every 6 hours as needed  In regards to preoperative pulmonary clearance, patient is not yet optimized to undergo anesthesia at this time  I would recommend deferring for several weeks/1 month and then reassessing  I discussed risks of generalized anesthesia/procedure with him and his family in detail and they verbalized understanding and agreement  Also reached  reached out to urology office to let them know as well

## 2023-03-15 NOTE — ASSESSMENT & PLAN NOTE
· Present on admission with leukocytosis, tachycardia, tachypnea, lactic acid of 3 6 in the setting of pneumonia  · CXR- pending  · COVID/Flu/RSV- negative  · procalcitonin 0 17, repeat in am  · Patient received IV Cefepime/Vancomycin in the ED, will continue for now  · Patient received IV fluids per sepsis protocol     · Continue IV fluids   · Check MRSA nasal Cx, sputum cx, Strep pneumonia and legionella urinary antigens  · Blood Cx- pending  · Consult pulmonology  · Labs in am

## 2023-03-15 NOTE — ASSESSMENT & PLAN NOTE
· Without acute exacerbation   · Patient's family to bring in home meds [facility]

## 2023-03-15 NOTE — SEPSIS NOTE
Sepsis Note   Will Petit 65 y o  male MRN: [id number]  Unit/Bed#: ED 02 Encounter: [id number]       Initial Sepsis Screening     [address] Name 03/15/23 1633                Is the patient's history suggestive of a new or worsening infection? Yes (Proceed)  -GC        Suspected source of infection pneumonia;urinary tract infection  -GC        Indicate SIRS criteria Tachycardia > 90 bpm;Tachypnea > 20 resp per min;Leukocytosis (WBC > 14144 IJL) OR Leukopenia (WBC <4000 IJL) OR Bandemia (WBC >10% bands)  -GC        Are two or more of the above signs & symptoms of infection both present and new to the patient? Yes (Proceed)  -GC        Assess for evidence of organ dysfunction: Are any of the below criteria present within 6 hours of suspected infection and SIRS criteria that are NOT considered to be chronic conditions? Lactate > 2 0  -GC        Date of presentation of severe sepsis --        Time of presentation of severe sepsis --        Sepsis Note: Click "NEXT" below (NOT "close") to generate sepsis note based on above information  YES (proceed by clicking "NEXT")  -GC              User Key  (r) = Recorded By, (t) = Taken By, (c) = Cosigned By    [address] Name Provider Type    [facility] [address] III, DO Physician                Fluid hydration was initiated at 500 cc of normal saline infused and then 30 cc/kg normal saline, broad-spectrum antibiotics administered, patient clinically had a pneumonia, patient's heart rate came down to mid 1 teens after Tylenol was administered  There is no height or weight on file to calculate BMI    Wt Readings from Last 1 Encounters:   03/13/23 93 7 kg (206 lb 9 6 oz)        Ideal body weight: 68 4 kg (150 lb 12 7 oz)  Adjusted ideal body weight: 78 5 kg (173 lb 1 9 oz)

## 2023-03-15 NOTE — RESPIRATORY THERAPY NOTE
RT Protocol Note  Heike Donovan 65 y o  male MRN: [id number]  Unit/Bed#: ED 02 Encounter: [id number]    Assessment    Principal Problem:    Sepsis due to pneumonia [facility])  Active Problems:    Coronary artery disease involving native coronary artery of native heart without angina pectoris    Hypertension    LAMBERTO on CPAP    Stage 3a chronic kidney disease (HCC)    Acute on chronic respiratory failure with hypoxia (HCC)    COPD (chronic obstructive pulmonary disease) ([facility])      Home Pulmonary Medications:  Albuterol  Neb 3 times a day and PRN  Spiriva   Wixela  Home Devices/Therapy: BiPAP/CPAP, Home O2 (2 liters at rest and 3 liters on ambulation but patient states he has been using 3 - 4 liters)    Past Medical History:   Diagnosis Date   • Bilateral carotid artery stenosis    • Cancer ([facility])     skin   • Chronic diastolic heart failure ([facility])    • Chronic ischemic heart disease    • Chronic kidney disease     stage 3   • Colon polyp    • COPD (chronic obstructive pulmonary disease) ([facility])    • Coronary artery disease     hx stents, MI, PCI   • COVID 11/2021   • CPAP (continuous positive airway pressure) dependence    • Emphysema of lung ([address] ) 1995 started   • Hearing loss    • History of transfusion 1995   • Hyperlipidemia    • Hypertension    • Lung nodule 2023    x rays   • MI (myocardial infarction) ([address] ) 1995   • Myocardial infarction ([address] ) 1995   • Pneumonia    • Pneumothorax feb 20,2023    collapsed lung   • Prostate cancer ([facility] [address] )    • RSV (respiratory syncytial virus infection) 10/2022   • S/P carotid endarterectomy    • Shortness of breath     O2 2 l/nc PRN   • Sleep apnea    • Sleep apnea, obstructive    • Stented coronary artery      Social History     Socioeconomic History   • Marital status: /Civil Union     Spouse name: None   • Number of children: None   • Years of education: None   • Highest education level: None   Occupational History   • None   Tobacco Use   • Smoking status: Former Packs/day: 2 00     Years: 35 00     Pack years: 70 00     Types: Cigarettes     Start date: 1960     Quit date: 1995     Years since quittin 7   • Smokeless tobacco: Never   • Tobacco comments:     stopped    Vaping Use   • Vaping Use: Never used   Substance and Sexual Activity   • Alcohol use: Never   • Drug use: Never   • Sexual activity: Not Currently     Partners: Female     Birth control/protection: None   Other Topics Concern   • None   Social History Narrative   • None     Social Determinants of Health     Financial Resource Strain: Not on file   Food Insecurity: No Food Insecurity   • Worried About Running Out of Food in the Last Year: Never true   • Ran Out of Food in the Last Year: Never true   Transportation Needs: No Transportation Needs   • Lack of Transportation (Medical): No   • Lack of Transportation (Non-Medical): No   Physical Activity: Not on file   Stress: Not on file   Social Connections: Not on file   Intimate Partner Violence: Not on file   Housing Stability: Low Risk    • Unable to Pay for Housing in the Last Year: No   • Number of Places Lived in the Last Year: 1   • Unstable Housing in the Last Year: No       Subjective         Objective    Physical Exam:   Assessment Type: Assess only  General Appearance: Alert, Awake  Respiratory Pattern: Dyspnea with exertion  Chest Assessment: Chest expansion symmetrical  Bilateral Breath Sounds: Diminished  Cough: None    Vitals:  Blood pressure 93/53, pulse (!) 114, temperature (!) 101 °F (38 3 °C), temperature source Tympanic, resp  rate 20, SpO2 93 %  Imaging and other studies: I have personally reviewed pertinent reports  Plan    Respiratory Plan: Home Bronchodilator Patient pathway        Resp Comments: Patient states he takes albuterol Neb 3 times a day  Will order patient on Xopenex TID  He also has an albuterol Neb ordered PRN  by the hospitalist   Patient family will bring in his home cpap tonight   Will continue to monitor the patient

## 2023-03-15 NOTE — PROGRESS NOTES
Stephanie Barker is a 65 y o  male who is currently ordered Vancomycin IV with management by the Pharmacy Consult service  Relevant clinical data and objective / subjective history reviewed  Vancomycin Assessment:  Indication and Goal AUC/Trough: Pneumonia (goal -600, trough >10)  Clinical Status: stable  Micro:   Bcx pending  Renal Function:  SCr: 1 13 mg/dL  CrCl: 59 8 mL/min  Renal replacement: Not on dialysis  Days of Therapy: 1  Current Dose: new start  Vancomycin Plan:  New Dosin mg IV q24  Estimated AUC: 499 mcg*hr/mL  Estimated Trough: 14 8 mcg/mL  Next Level: 318 AM  Renal Function Monitoring: Daily BMP and [place] will continue to follow closely for s/sx of nephrotoxicity, infusion reactions and appropriateness of therapy  BMP and CBC will be ordered per protocol  We will continue to follow the patient’s culture results and clinical progress daily      Yany Monzon, Pharmacist

## 2023-03-16 LAB
2HR DELTA HS TROPONIN: -5 NG/L
4HR DELTA HS TROPONIN: -6 NG/L
ALBUMIN SERPL BCP-MCNC: 3.1 G/DL (ref 3.5–5)
ALP SERPL-CCNC: 58 U/L (ref 34–104)
ALT SERPL W P-5'-P-CCNC: 30 U/L (ref 7–52)
ANION GAP SERPL CALCULATED.3IONS-SCNC: 7 MMOL/L (ref 4–13)
AST SERPL W P-5'-P-CCNC: 28 U/L (ref 13–39)
ATRIAL RATE: 143 BPM
BILIRUB SERPL-MCNC: 0.6 MG/DL (ref 0.2–1)
BUN SERPL-MCNC: 12 MG/DL (ref 5–25)
CALCIUM ALBUM COR SERPL-MCNC: 9 MG/DL (ref 8.3–10.1)
CALCIUM SERPL-MCNC: 8.3 MG/DL (ref 8.4–10.2)
CARDIAC TROPONIN I PNL SERPL HS: 30 NG/L
CARDIAC TROPONIN I PNL SERPL HS: 31 NG/L
CARDIAC TROPONIN I PNL SERPL HS: 36 NG/L
CHLORIDE SERPL-SCNC: 101 MMOL/L (ref 96–108)
CO2 SERPL-SCNC: 31 MMOL/L (ref 21–32)
CREAT SERPL-MCNC: 0.99 MG/DL (ref 0.6–1.3)
ERYTHROCYTE [DISTWIDTH] IN BLOOD BY AUTOMATED COUNT: 13.4 % (ref 11.6–15.1)
GFR SERPL CREATININE-BSD FRML MDRD: 72 ML/MIN/1.73SQ M
GLUCOSE SERPL-MCNC: 89 MG/DL (ref 65–140)
HCT VFR BLD AUTO: 41.5 % (ref 36.5–49.3)
HGB BLD-MCNC: 13.1 G/DL (ref 12–17)
MAGNESIUM SERPL-MCNC: 2 MG/DL (ref 1.9–2.7)
MCH RBC QN AUTO: 32.7 PG (ref 26.8–34.3)
MCHC RBC AUTO-ENTMCNC: 31.6 G/DL (ref 31.4–37.4)
MCV RBC AUTO: 104 FL (ref 82–98)
P AXIS: 75 DEGREES
PHOSPHATE SERPL-MCNC: 2.6 MG/DL (ref 2.3–4.1)
PLATELET # BLD AUTO: 202 THOUSANDS/UL (ref 149–390)
PMV BLD AUTO: 10 FL (ref 8.9–12.7)
POTASSIUM SERPL-SCNC: 3.3 MMOL/L (ref 3.5–5.3)
PR INTERVAL: 140 MS
PROCALCITONIN SERPL-MCNC: 0.51 NG/ML
PROT SERPL-MCNC: 5.9 G/DL (ref 6.4–8.4)
QRS AXIS: 76 DEGREES
QRSD INTERVAL: 84 MS
QT INTERVAL: 270 MS
QTC INTERVAL: 416 MS
RBC # BLD AUTO: 4.01 MILLION/UL (ref 3.88–5.62)
SODIUM SERPL-SCNC: 139 MMOL/L (ref 135–147)
T WAVE AXIS: 64 DEGREES
VENTRICULAR RATE: 143 BPM
WBC # BLD AUTO: 11.58 THOUSAND/UL (ref 4.31–10.16)

## 2023-03-16 RX ORDER — POTASSIUM CHLORIDE 20 MEQ/1
40 TABLET, EXTENDED RELEASE ORAL ONCE
Status: COMPLETED | OUTPATIENT
Start: 2023-03-16 | End: 2023-03-16

## 2023-03-16 RX ORDER — PREDNISONE 20 MG/1
40 TABLET ORAL DAILY
Status: DISCONTINUED | OUTPATIENT
Start: 2023-03-16 | End: 2023-03-17 | Stop reason: HOSPADM

## 2023-03-16 RX ORDER — METOPROLOL SUCCINATE 25 MG/1
12.5 TABLET, EXTENDED RELEASE ORAL 2 TIMES DAILY
Status: DISCONTINUED | OUTPATIENT
Start: 2023-03-16 | End: 2023-03-17 | Stop reason: HOSPADM

## 2023-03-16 RX ADMIN — CEFEPIME HYDROCHLORIDE 2000 MG: 2 INJECTION, SOLUTION INTRAVENOUS at 04:19

## 2023-03-16 RX ADMIN — GUAIFENESIN 600 MG: 600 TABLET ORAL at 17:02

## 2023-03-16 RX ADMIN — ASPIRIN 81 MG 81 MG: 81 TABLET ORAL at 21:44

## 2023-03-16 RX ADMIN — FAMOTIDINE 20 MG: 20 TABLET, FILM COATED ORAL at 21:42

## 2023-03-16 RX ADMIN — POTASSIUM CHLORIDE 20 MEQ: 1500 TABLET, EXTENDED RELEASE ORAL at 09:03

## 2023-03-16 RX ADMIN — PREDNISONE 40 MG: 20 TABLET ORAL at 17:02

## 2023-03-16 RX ADMIN — ENOXAPARIN SODIUM 40 MG: 100 INJECTION SUBCUTANEOUS at 08:51

## 2023-03-16 RX ADMIN — CEFEPIME HYDROCHLORIDE 2000 MG: 2 INJECTION, SOLUTION INTRAVENOUS at 16:43

## 2023-03-16 RX ADMIN — LEVALBUTEROL HYDROCHLORIDE 1.25 MG: 1.25 SOLUTION RESPIRATORY (INHALATION) at 19:39

## 2023-03-16 RX ADMIN — ATORVASTATIN CALCIUM 80 MG: 80 TABLET, FILM COATED ORAL at 17:02

## 2023-03-16 RX ADMIN — LEVALBUTEROL HYDROCHLORIDE 1.25 MG: 1.25 SOLUTION RESPIRATORY (INHALATION) at 13:11

## 2023-03-16 RX ADMIN — TIOTROPIUM BROMIDE INHALATION SPRAY 1 PUFF: 3.12 SPRAY, METERED RESPIRATORY (INHALATION) at 08:57

## 2023-03-16 RX ADMIN — FLUTICASONE PROPIONATE AND SALMETEROL 1 PUFF: 500; 50 POWDER RESPIRATORY (INHALATION) at 21:43

## 2023-03-16 RX ADMIN — GABAPENTIN 300 MG: 300 CAPSULE ORAL at 08:52

## 2023-03-16 RX ADMIN — LEVALBUTEROL HYDROCHLORIDE 1.25 MG: 1.25 SOLUTION RESPIRATORY (INHALATION) at 07:26

## 2023-03-16 RX ADMIN — METOPROLOL SUCCINATE 12.5 MG: 25 TABLET, EXTENDED RELEASE ORAL at 21:42

## 2023-03-16 RX ADMIN — POTASSIUM CHLORIDE 40 MEQ: 1500 TABLET, EXTENDED RELEASE ORAL at 09:02

## 2023-03-16 RX ADMIN — METOPROLOL SUCCINATE 12.5 MG: 25 TABLET, EXTENDED RELEASE ORAL at 08:51

## 2023-03-16 RX ADMIN — GUAIFENESIN 600 MG: 600 TABLET ORAL at 08:52

## 2023-03-16 RX ADMIN — VANCOMYCIN HYDROCHLORIDE 1500 MG: 1 INJECTION, POWDER, LYOPHILIZED, FOR SOLUTION INTRAVENOUS at 18:01

## 2023-03-16 RX ADMIN — GABAPENTIN 300 MG: 300 CAPSULE ORAL at 17:02

## 2023-03-16 RX ADMIN — ACETAMINOPHEN 650 MG: 325 TABLET ORAL at 10:17

## 2023-03-16 RX ADMIN — FLUTICASONE PROPIONATE AND SALMETEROL 1 PUFF: 500; 50 POWDER RESPIRATORY (INHALATION) at 08:57

## 2023-03-16 NOTE — UTILIZATION REVIEW
Initial Clinical Review    Admission: Date/Time/Statement:   Admission Orders (From admission, onward)     Ordered        03/15/23 1708  INPATIENT ADMISSION  Once                      Orders Placed This Encounter   Procedures   • INPATIENT ADMISSION     Standing Status:   Standing     Number of Occurrences:   1     Order Specific Question:   Level of Care     Answer:   Med Surg [16]     Order Specific Question:   Estimated length of stay     Answer:   More than 2 Midnights     Order Specific Question:   Certification     Answer:   I certify that inpatient services are medically necessary for this patient for a duration of greater than two midnights  See H&P and MD Progress Notes for additional information about the patient's course of treatment  ED Arrival Information     Expected   -    Arrival   3/15/2023 16:22    Acuity   Emergent            Means of arrival   Wheelchair    Escorted by   Spouse    Service   Hospitalist    Admission type   Emergency            Arrival complaint   SOB chest pains           Chief Complaint   Patient presents with   • Shortness of Breath     Patient reports shortness of breath over the past few days  Increased NC o2 to 4L  Patient also reports chest pain that has been intermittent for two days        Initial Presentation: 65 y o  male to the ED from home with complaints of shortness of breath worsening over the past 3 days  Admitted to inpatient for   Sepsis due to pneumonia, acute on chronic respiratory failure with hypoxia  H/O COPD and recent pneumothorax: iatrogenic secondary to intercoastal nerve block on the right status post chest tube insertion and discharged on 23 February 2023, CKD, LAMBERTO, GERD, chronic ischemic heart disease  Arrives tachycardic, tachypneic, febrile  Normally wears 2 LNC, has had to increase to 4L  Arrives with rhonchi heard in lungs  Lactic acid 3 6  WBCs 15 85  CXR shows bibasilar pneumonia  Blood cultures pending  Started on IV abx   Given IV fluid bolus in the Ed  PUlmonary consult  IV fluids started  Date: 3/16   Day 2:   Continue with IV fluids, iv abx  Lactic acid improved with IV fluids  Continue to trend procal  Currently requiring 4 LNC  Distant lungs sounds  Pulmonary consult: Recently admitted 2/20-2/23/23 with iatrogenic R pneumothorax after a R sided nerve block procedure as an outpatient s/p chest tube placement and removal   Also treated for a COPD exacerbation which had been diagnosed before that admission  Discharged on his home oxygen requirement  Add prednisone     ED Triage Vitals   Temperature Pulse Respirations Blood Pressure SpO2   03/15/23 1631 03/15/23 1631 03/15/23 1631 03/15/23 1631 03/15/23 1631   (!) 101 2 °F (38 4 °C) (!) 144 (!) 26 145/75 98 %      Temp Source Heart Rate Source Patient Position - Orthostatic VS BP Location FiO2 (%)   03/15/23 1631 03/15/23 1631 03/15/23 1715 03/15/23 1715 --   Tympanic Monitor Sitting Right arm       Pain Score       03/15/23 1631       1          Wt Readings from Last 1 Encounters:   03/15/23 93 2 kg (205 lb 7 5 oz)     Additional Vital Signs:   Time Temp Pulse Resp BP MAP (mmHg) SpO2 Calculated FIO2 (%) - Nasal Cannula Nasal Cannula O2 Flow Rate (L/min) O2 Device O2 Interface Device Patient Position - Orthostatic VS   03/16/23 07:44:52 100 °F (37 8 °C) 112 Abnormal  20 140/73 95 88 % Abnormal  -- -- -- -- --   03/16/23 0726 -- -- -- -- -- 93 % 36 4 L/min Nasal cannula -- --   03/16/23 0725 -- -- -- -- -- 93 % -- -- -- -- --   03/16/23 03:21:51 98 8 °F (37 1 °C) 97 18 124/68 87 96 % -- -- CPAP -- --   03/15/23 22:41:48 97 9 °F (36 6 °C) 100 19 101/58 72 93 % 36 4 L/min -- -- --   03/15/23 2100 -- -- -- -- -- -- -- -- -- Nasal mask --   03/15/23 2053 -- 104 18 -- -- 93 % 36 4 L/min Nasal cannula -- --   03/15/23 19:28:31 98 3 °F (36 8 °C) 113 Abnormal  21 109/67 81 91 % -- -- -- -- --   03/15/23 1902 100 4 °F (38 °C) -- -- 99/59 -- -- -- -- -- -- --   03/15/23 1900 -- 107 Abnormal  20 98/57 71 94 % -- -- Nasal cannula -- Sitting   03/15/23 1730 -- 126 Abnormal  20 91/55 69 92 % 40 5 L/min Nasal cannula -- Sitting   03/15/23 1722 101 °F (38 3 °C) Abnormal  -- -- -- -- -- -- -- -- -- --   03/15/23 1715 -- 135 Abnormal  24 Abnormal  104/54 77 90 % -- -- Nasal cannula -- Sitting   03/15/23 1647 -- -- -- -- -- -- -- -- Nasal cannula -- --   03/15/23 1631 101 2 °F (38 4 °C) Abnormal  144 Abnormal  26 Abnormal  145/75 -- 98 % -- -- None (Room air) --        Pertinent Labs/Diagnostic Test Results:   3/16 EKG:Sinus tachycardia  Nonspecific ST and T wave abnormality  Abnormal ECG  XR chest 1 view portable   ED Interpretation by Deirdre Bain III, DO (03/15 1650)   X-ray, portable, right-sided infiltrate  Final Result by Parth Farnsworth MD (03/15 2011)      Persistent bibasilar pneumonia, greater on the right  Severe emphysema                 Workstation performed: [id number]           Results from last 7 days   Lab Units 03/15/23  1637   SARS-COV-2  Negative     Results from last 7 days   Lab Units 03/16/23  0426 03/15/23  1637   WBC Thousand/uL 11 58* 15 85*   HEMOGLOBIN g/dL 13 1 15 8   HEMATOCRIT % 41 5 50 7*   PLATELETS Thousands/uL 202 275   NEUTROS ABS Thousands/µL  --  12 67*         Results from last 7 days   Lab Units 03/16/23  0426 03/15/23  1637   SODIUM mmol/L 139 140   POTASSIUM mmol/L 3 3* 4 0   CHLORIDE mmol/L 101 95*   CO2 mmol/L 31 31   ANION GAP mmol/L 7 14*   BUN mg/dL 12 13   CREATININE mg/dL 0 99 1 13   EGFR ml/min/1 73sq m 72 61   CALCIUM mg/dL 8 3* 9 7   MAGNESIUM mg/dL 2 0  --    PHOSPHORUS mg/dL 2 6  --      Results from last 7 days   Lab Units 03/16/23  0426 03/15/23  1637   AST U/L 28 30   ALT U/L 30 31   ALK PHOS U/L 58 75   TOTAL PROTEIN g/dL 5 9* 7 5   ALBUMIN g/dL 3 1* 3 8   TOTAL BILIRUBIN mg/dL 0 60 0 65         Results from last 7 days   Lab Units 03/16/23  0426 03/15/23  1637   GLUCOSE RANDOM mg/dL 89 124         Results from last 7 days   Lab Units 03/16/23  0751 03/15/23  2051 03/15/23  1859 03/15/23  1644   HS TNI 0HR ng/L 36  --   --  27   HS TNI 2HR ng/L  --   --  40  --    HSTNI D2 ng/L  --   --  13  --    HS TNI 4HR ng/L  --  41  --   --    HSTNI D4 ng/L  --  14  --   --          Results from last 7 days   Lab Units 03/15/23  1637   PROTIME seconds 13 5   INR  1 03   PTT seconds 28         Results from last 7 days   Lab Units 03/16/23  0426 03/15/23  1637   PROCALCITONIN ng/ml 0 51* 0 17     Results from last 7 days   Lab Units 03/15/23  1859 03/15/23  1637   LACTIC ACID mmol/L 1 3 3 6*       Results from last 7 days   Lab Units 03/15/23  1637   BNP pg/mL 114*     Results from last 7 days   Lab Units 03/15/23  2033   CLARITY UA  Clear   COLOR UA  Yellow   SPEC GRAV UA  1 010   PH UA  6 0   GLUCOSE UA mg/dl Negative   KETONES UA mg/dl Negative   BLOOD UA  Trace-Intact*   PROTEIN UA mg/dl Negative   NITRITE UA  Negative   BILIRUBIN UA  Negative   UROBILINOGEN UA E U /dl 0 2   LEUKOCYTES UA  Negative   WBC UA /hpf 0-1   RBC UA /hpf 0-1   BACTERIA UA /hpf Occasional   EPITHELIAL CELLS WET PREP /hpf Occasional     Results from last 7 days   Lab Units 03/15/23  2025 03/15/23  1637   STREP PNEUMONIAE ANTIGEN, URINE  Negative  --    LEGIONELLA URINARY ANTIGEN  Negative  --    INFLUENZA A PCR   --  Negative   INFLUENZA B PCR   --  Negative   RSV PCR   --  Negative       Results from last 7 days   Lab Units 03/15/23  1637   BLOOD CULTURE  Received in Microbiology Lab  Culture in Progress  Received in Microbiology Lab  Culture in Progress         ED Treatment:   Medication Administration from 03/15/2023 1622 to 03/15/2023 1921       Date/Time Order Dose Route Action Comments     03/15/2023 1641 EDT cefepime (MAXIPIME) IVPB (premix in dextrose) 2,000 mg 50 mL 2,000 mg Intravenous New Bag --     03/15/2023 1716 EDT vancomycin (VANCOCIN) 1500 mg in sodium chloride 0 9% 250 mL IVPB 1,500 mg Intravenous New Bag --     03/15/2023 1640 T sodium chloride 0 9 % bolus 500 mL 500 mL Intravenous New Bag --     03/15/2023 1641 EDT acetaminophen (TYLENOL) tablet 650 mg 650 mg Oral Given --     03/15/2023 1720 EDT sodium chloride 0 9 % bolus 1,000 mL 1,000 mL Intravenous New Bag --     03/15/2023 1719 EDT sodium chloride 0 9 % bolus 1,000 mL 1,000 mL Intravenous New Bag --     03/15/2023 1805 EDT sodium chloride 0 9 % bolus 1,000 mL 1,000 mL Intravenous New Bag --        Past Medical History:   Diagnosis Date   • Bilateral carotid artery stenosis    • Cancer (HCC)     skin   • Chronic diastolic heart failure (HCC)    • Chronic ischemic heart disease    • Chronic kidney disease     stage 3   • Colon polyp    • COPD (chronic obstructive pulmonary disease) (HCC)    • Coronary artery disease     hx stents, MI, PCI   • COVID 11/2021   • CPAP (continuous positive airway pressure) dependence    • Emphysema of lung ([facility] ) 1995 started   • Hearing loss    • History of transfusion 1995   • Hyperlipidemia    • Hypertension    • Lung nodule 2023    x rays   • MI (myocardial infarction) ([facility] ) 1995   • Myocardial infarction ([facility] ) 1995   • Pneumonia    • Pneumothorax feb 20,2023    collapsed lung   • Prostate cancer ([facility] )    • RSV (respiratory syncytial virus infection) 10/2022   • S/P carotid endarterectomy    • Shortness of breath     O2 2 l/nc PRN   • Sleep apnea    • Sleep apnea, obstructive    • Stented coronary artery        Admitting Diagnosis: Sinus tachycardia [R00 0]  Leukocytosis [D72 829]  Pneumonia [J18 9]  SOB (shortness of breath) [R06 02]  Fever [R50 9]  Age/Sex: 65 y o  male  Admission Orders:   UP and OOB  TEle  SCds    Scheduled Medications:  aspirin, 81 mg, Oral, Daily  atorvastatin, 80 mg, Oral, Daily With Dinner  cefepime, 2,000 mg, Intravenous, Q12H  enoxaparin, 40 mg, Subcutaneous, Daily  famotidine, 20 mg, Oral, HS  Fluticasone-Salmeterol, 1 puff, Inhalation, BID  gabapentin, 300 mg, Oral, BID  guaiFENesin, 600 mg, Oral, BID  levalbuterol, 1 25 mg, Nebulization, TID  metoprolol succinate, 12 5 mg, Oral, BID  potassium chloride, 20 mEq, Oral, Daily  tiotropium, 1 puff, Inhalation, Daily  vancomycin, 15 mg/kg, Intravenous, Q24H      Continuous IV Infusions:     PRN Meds:  acetaminophen, 650 mg, Oral, Q4H PRN  albuterol, 2 5 mg, Nebulization, Q6H PRN  ondansetron, 4 mg, Intravenous, Q6H PRN        IP CONSULT TO PHARMACY  IP CONSULT TO PULMONOLOGY    Network Utilization Review Department  ATTENTION: Please call with any questions or concerns to [phone] and carefully listen to the prompts so that you are directed to the right person  All voicemails are confidential   Nelida Raphael all requests for admission clinical reviews, approved or denied determinations and any other requests to dedicated fax number below belonging to the campus where the patient is receiving treatment   List of dedicated fax numbers for the Facilities:  [address] DENIALS (Administrative/Medical Necessity) [phone]   [address] (Maternity/NICU/Pediatrics) [phone]    Shannon Oconnell [phone]   [place] [address] [phone]   [address] [place] [address] [facility] Rudy Chadwick 28 [phone]   [facility] Antonio Posadas [facility] 134 815 [facility] [phone]

## 2023-03-16 NOTE — ASSESSMENT & PLAN NOTE
· Patient complaining of chest pain this morning  Troponins performed on admission with no significant rise or fall  · Will check EKG  · Chest pain improved with nebulizer    Possibly secondary to pneumonia and patient's underlying COPD  · Repeat troponins  · Nitro as needed no weight-bearing restrictions

## 2023-03-16 NOTE — ASSESSMENT & PLAN NOTE
· Patient is chronically on 2 L nasal cannula at home and also uses CPAP at bedtime  · In the ER patient was tachycardic and tachypneic  · Will continue oxygen and titrate as tolerated    Patient currently on 4 L nasal cannula  · Pulmonology consulted

## 2023-03-16 NOTE — CASE MANAGEMENT
Case Management Assessment & Discharge Planning Note    Patient name Issa Velez  Location /-50 MRN [id number]  : 1945 Date 3/16/2023       Current Admission Date: 3/15/2023  Current Admission Diagnosis:Sepsis due to pneumonia [facility])   Patient Active Problem List    Diagnosis Date Noted   • Sepsis due to pneumonia ([facility] ) 03/15/2023   • Pneumothorax on right 2023   • Leukocytosis 2023   • Left leg pain 2023   • Transient right leg weakness 2023   • Hypokalemia 2023   • COVID 2023   • Ambulatory dysfunction 2023   • COPD (chronic obstructive pulmonary disease) ([facility] ) 2023   • Right lower quadrant abdominal pain 10/23/2022   • RSV (respiratory syncytial virus infection) 10/21/2022   • Obstructive sleep apnea syndrome in adult 10/19/2022   • Sensorineural hearing loss, bilateral 10/19/2022   • Acute on chronic respiratory failure with hypoxia ([address] ) 10/15/2022   • Prostate cancer ([facility] [address] ) 2022   • Elevated MCV 2022   • Cubital tunnel syndrome on left 2022   • Carpal tunnel syndrome on left 2022   • Intercostal neuralgia 2022   • Urinary frequency 2022   • Incomplete bladder emptying 2022   • Chronic bilateral low back pain with right-sided sciatica 2022   • Mid back pain 2022   • Thoracic radiculopathy 2022   • Chronic pain syndrome 2022   • Stage 3a chronic kidney disease ([facility] ) 2022   • Hoarseness or changing voice 2022   • Chronic right-sided thoracic back pain 2022   • Primary insomnia 2022   • Right hip pain 2022   • Abnormal nuclear stress test 2021   • Chest pain 2021   • Costochondral chest pain 01/15/2021   • COPD with exacerbation ([address] ) 2021   • Oxygen dependent 2021   • S/P carotid endarterectomy 2021   • Stented coronary artery 2021   • Vertigo 2021   • Anisometropia 2021   • Osteoarthritis of spinal facet joint 01/11/2021   • Chronic ischemic heart disease 01/11/2021   • Chronic diastolic (congestive) heart failure ([facility] ) 01/11/2021   • Diverticular disease of colon 01/11/2021   • Dry eye syndrome 01/11/2021   • Gastroesophageal reflux disease 01/11/2021   • Hearing loss 01/11/2021   • Elevated PSA 01/11/2021   • Hypoxia 01/11/2021   • Lens replaced by other means 01/11/2021   • Lumbar radiculopathy 01/11/2021   • Multinodular goiter 01/11/2021   • Nuclear senile cataract 01/11/2021   • Obesity 01/11/2021   • Occlusion and stenosis of carotid artery 01/11/2021   • Osteoarthritis of hip 01/11/2021   • Prediabetes 01/11/2021   • Psychosexual dysfunction with inhibited sexual excitement 01/11/2021   • LAMBERTO on CPAP 01/11/2021   • Solitary pulmonary nodule 01/11/2021   • Thyroid nodule 01/11/2021   • Guyon syndrome, 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   • Depression, recurrent ([facility] [address] )    • Hyperlipidemia    • Coronary artery disease involving native coronary artery of native heart without angina pectoris    • Left carotid artery occlusion    • Hypertension       LOS (days): 1  Geometric Mean LOS (GMLOS) (days): 5 00  Days to GMLOS:4 3     OBJECTIVE:  PATIENT READMITTED TO HOSPITAL  Risk of Unplanned Readmission Score: 34 74     Current admission status: Inpatient  Preferred Pharmacy:   [address] [place] [address]  DR JONES[address]  [address]   DR Sean [place] [address]  Phone: [phone] Fax: [phone] [address] Delivery ([facility] ) - Samantha Richard [address]  Phone: [phone] Fax: [phone]    Primary Care Provider: Demarcus Lema,     Primary Insurance: [facility]  Secondary Insurance:     ASSESSMENT:  [address] 50 Representative - Spouse   Primary Phone: [phone] (Mobile)               Advance Directives  Does patient have a [address] [place] [address] Avenue?: Yes  Does patient have Advance Directives?: Yes  Advance Directives: Living will, Power of  for health care  Primary Contact: Hailey Rebolledo wife  Readmission [facility]  30 Day Readmission: Yes  Who directed you to return to the hospital?: Self  Did you understand whom to contact if you had questions or problems?: Yes  Did you get your prescriptions before you left the hospital?: No  Reason[de-identified] Declined service  Were you able to get your prescriptions filled when you left the hospital?: Yes  Did you take your medications as prescribed?: Yes  Were you able to get to your follow-up appointments?: Yes  During previous admission, was a post-acute recommendation made?: No  Patient was readmitted due to: Pneumonia    Patient Information  Admitted from[de-identified] Home  Mental Status: Alert  During Assessment patient was accompanied by: Not accompanied during assessment  Assessment information provided by[de-identified] Patient  Primary Caregiver: Self  Support Systems: Spouse/significant other  [place] of Residence: [address] [place] [address] Avenue do you live in?: Via [employer] entry access options   Select all that apply : Stairs  Number of steps to enter home : 3  Do the steps have railings?: Yes  Type of Current Residence: [address] home  Upon entering residence, is there a bedroom on the main floor (no further steps)?: Yes  Upon entering residence, is there a bathroom on the main floor (no further steps)?: Yes  In the last 12 months, was there a time when you were not able to pay the mortgage or rent on time?: No  In the last 12 months, how many places have you lived?: 1  In the last 12 months, was there a time when you did not have a steady place to sleep or slept in a shelter (including now)?: No  Homeless/housing insecurity resource given?: N/A  Living Arrangements: Lives w/ Spouse/significant other  Is patient a ?: Yes  Is patient active with [facility])?: Yes  Is patient service connected?: No    Activities of Daily Living Prior to Admission  Functional Status: Independent  Completes ADLs independently?: Yes  Ambulates independently?: Yes  Does patient use assisted devices?: No  Does patient currently own DME?: Yes  What DME does the patient currently own?: Margaret Hart, Home Oxygen concentrator, Portable Oxygen concentrator, CPAP  Does patient have a history of Outpatient Therapy (PT/OT)?: No  Does the patient have a history of Short-Term Rehab?: No  Does patient have a history of HHC?: Yes ([facility])  Does patient currently have [address]?: No    Patient Information Continued  Income Source: Pension/snf  Does patient have prescription coverage?: Yes  Within the past 12 months, you worried that your food would run out before you got the money to buy more : Never true  Within the past 12 months, the food you bought just didn't last and you didn't have money to get more : Never true  Food insecurity resource given?: N/A  Does patient receive dialysis treatments?: No  Does patient have a history of substance abuse?: No  Does patient have a history of Mental Health Diagnosis?: No    PHQ 2/9 Screening   Reviewed PHQ 2/9 Depression Screening Score?: No    Means of Transportation  Means of Transport to Appts[de-identified] Family transport  In the past 12 months, has lack of transportation kept you from medical appointments or from getting medications?: No  In the past 12 months, has lack of transportation kept you from meetings, work, or from getting things needed for daily living?: No  Was application for public transport provided?: N/A        DISCHARGE DETAILS:    Discharge planning discussed with[de-identified] [address]         Is the patient interested in [address] at discharge?: No    DME Referral Provided  Referral made for DME?: No  Would you like to participate in our [address] service program?  : No - Declined    Treatment Team Recommendation: Home  Discharge Destination Plan[de-identified] Home  Transport at Discharge : Family    Will follow for care needs, may want [address]  Has a pending Pulmonary consult

## 2023-03-16 NOTE — CONSULTS
PULMONOLOGY CONSULT NOTE     Name: Usama Abdullahi   Age & Sex: 65 y o  male   MRN: [id number]  Unit/Bed#: -01   Encounter: [id number]        Reason for consultation: Respiratory failure    Requesting physician: Internal Medicine    Assessment:  Acute on chronic hypoxemic respiratory failure  COPD exacerbation  Pneumonia  LAMBERTO on CPAP   Obesity BMi 31  CAD  ICMP  Chronic systolic heart failure    Recommendations:  Now on home O2 of 4 lpm, wean for SPO2 >88%  Add prednisone 40 mg x 5 days  Xopenex/Atrovent TID  Presented with fever, tachycardia, elevated lactate, procal now 0 5- would treat for pneumonia x 7 days- currently on vancomycin and cefepime- can deescalate when MRSA swab and culture data is finalized  As outpatient can continue Wixela and Spiriva with PRN albuterol  He is on high dose wixela with ICS at 500 can consider decreasing when he has pulmonary follow-up given multiple episodes of pneumonia  Has a pulmonary appt with Dr Eleuterio Severe scheduled for April  Discussed with Dr Noni Lozano with [facility] via [address]      History of Present Illness   HPI:  Usama Abdullahi is a 65 y o  male with PMHx of COPD, chronic hypoxemic respiratory failure on 3-4 lpm of oxygen at baseline, CAD, ischemic cardiomyopathy, obesity BMI 31, LAMBERTO on CPAP at home who presents with shortness of breath, cough, sputum production x 2-3 days  Recently admitted 2/20-2/23/23 with iatrogenic R pneumothorax after a R sided nerve block procedure as an outpatient s/p chest tube placement and removal   He was also treated for a COPD exacerbation which had been diagnosed before that admission  He was discharged on his home oxygen requirement  After discharge saw PCP on 2/28  Then saw cardiology on 3/8- with no change in medications  He is on lasix 40 mg daily  Saw Pulmonary- Manny Steele PA-C on 3/13- He was continued on Wixela and Spiriva with PRN albuterol inhaler/nebs  He was continued on 4 lpm of oxygen    He was resumed on CPAP  He presented two days after that visit on 3/15 with shortness of breath x 2 days, productive cough  Fevers/chills at home  Sepsis criteria met in ED  Febrile in [address] 2, tachycardic, tachypnic  Procal negative--then 0 51 3/16  WBC 15 on admission but was 20 3 weeks ago  Lactate 3 6    He is a former smoker quit many years ago  Currently he is on cefepime, Vancomycin  Not on steroid treatment  Review of systems:  12 point review of systems was completed and was otherwise negative except as listed in HPI        Historical Information   Past Medical History:   Diagnosis Date   • Bilateral carotid artery stenosis    • Cancer (HCC)     skin   • Chronic diastolic heart failure (HCC)    • Chronic ischemic heart disease    • Chronic kidney disease     stage 3   • Colon polyp    • COPD (chronic obstructive pulmonary disease) (HCC)    • Coronary artery disease     hx stents, MI, PCI   • COVID 11/2021   • CPAP (continuous positive airway pressure) dependence    • Emphysema of lung ([address] ) 1995 started   • Hearing loss    • History of transfusion 1995   • Hyperlipidemia    • Hypertension    • Lung nodule 2023    x rays   • MI (myocardial infarction) ([address] ) 1995   • Myocardial infarction ([address] ) 1995   • Pneumonia    • Pneumothorax feb 20,2023    collapsed lung   • Prostate cancer ([facility] [address] )    • RSV (respiratory syncytial virus infection) 10/2022   • S/P carotid endarterectomy    • Shortness of breath     O2 2 l/nc PRN   • Sleep apnea    • Sleep apnea, obstructive    • Stented coronary artery      Past Surgical History:   Procedure Laterality Date   • APPENDECTOMY     • CARDIAC CATHETERIZATION  03/18/2021    left main with no significant disease, proximal LAD with 10% stenosis at the site of prior stent, left circumflex artery with minimal luminal irregularities mid RCA with 50% stenosis at site of prior stent and PL segment with distal disease supplied by collaterals from the distal circumflex with no significant CAD requiring revascularization at that time     • CATARACT EXTRACTION, BILATERAL Bilateral    • COLONOSCOPY     • CORONARY ANGIOPLASTY WITH STENT PLACEMENT      RCA   • CORONARY ANGIOPLASTY WITH STENT PLACEMENT      RCA   • CORONARY ANGIOPLASTY WITH STENT PLACEMENT      LAD   • EYE SURGERY     • OR BX PROSTATE STRTCTC SATURATION SAMPLING IMG GID N/A 2022    Procedure: TRANSPERINEAL MRI FUSION  BIOPSY PROSTATE;  Surgeon: Sharyn Omer MD;  [facility]: [facility];  Service: Urology   • OR NEUROPLASTY &/TRANSPOS MEDIAN NRV Zoë Doeor Left 2022    Procedure: RELEASE CARPAL TUNNEL;  Surgeon: Giorgio Carias MD;  Location: [facility];  Service: Orthopedics   • OR NEUROPLASTY &/TRANSPOSITION ULNAR NERVE ELBOW Left 2022    Procedure: RELEASE CUBITAL TUNNEL;  Surgeon: Giorgio Carias MD;  Location: [facility];  Service: Orthopedics   • OR NEUROPLASTY &/TRANSPOSITION ULNAR NERVE WRIST Left 2022    Procedure: Jermaine Bueno;  Surgeon: Giorgio Carias MD;  Location: [facility];  Service: Orthopedics   • SKIN CANCER EXCISION      chin-per pt, basal cell  also right ankle   • TONSILLECTOMY  no     Family History   Problem Relation Age of Onset   • Heart attack Mother    • Dementia Mother    • Heart failure Mother             • No Known Problems Father            Social History:   Social History     Tobacco Use   Smoking Status Former   • Packs/day: 2 00   • Years: 35 00   • Pack years: 70 00   • Types: Cigarettes   • Start date: 1960   • Quit date: 1995   • Years since quittin 7   Smokeless Tobacco Never   Tobacco Comments    stopped          Meds/Allergies   Current Facility-Administered Medications   Medication Dose Route Frequency   • acetaminophen (TYLENOL) tablet 650 mg  650 mg Oral Q4H PRN   • albuterol inhalation solution 2 5 mg  2 5 mg Nebulization Q6H PRN   • aspirin chewable tablet 81 mg  81 mg Oral Daily   • atorvastatin (LIPITOR) tablet 80 mg  80 mg Oral Daily With Dinner   • cefepime (MAXIPIME) IVPB (premix in dextrose) 2,000 mg 50 mL  2,000 mg Intravenous Q12H   • enoxaparin (LOVENOX) subcutaneous injection 40 mg  40 mg Subcutaneous Daily   • famotidine (PEPCID) tablet 20 mg  20 mg Oral HS   • Fluticasone-Salmeterol (Advair) 500-50 mcg/dose inhaler 1 puff  1 puff Inhalation BID   • gabapentin (NEURONTIN) capsule 300 mg  300 mg Oral BID   • guaiFENesin (MUCINEX) 12 hr tablet 600 mg  600 mg Oral BID   • levalbuterol (XOPENEX) inhalation solution 1 25 mg  1 25 mg Nebulization TID   • metoprolol succinate (TOPROL-XL) 24 hr tablet 12 5 mg  12 5 mg Oral BID   • ondansetron (ZOFRAN) injection 4 mg  4 mg Intravenous Q6H PRN   • potassium chloride (K-DUR,KLOR-CON) CR tablet 20 mEq  20 mEq Oral Daily   • tiotropium (SPIRIVA RESPIMAT) 2 5 MCG/ACT inhaler AERS 1 puff  1 puff Inhalation Daily   • vancomycin (VANCOCIN) 1500 mg in sodium chloride 0 9% 250 mL IVPB  15 mg/kg Intravenous Q24H     Medications Prior to Admission   Medication   • albuterol (2 5 mg/3 mL) 0 083 % nebulizer solution   • aspirin 81 mg chewable tablet   • azithromycin (ZITHROMAX) 500 MG tablet   • Blood Pressure Monitor KIT   • dextromethorphan-guaiFENesin (ROBITUSSIN DM)  mg/5 mL syrup   • famotidine (PEPCID) 20 mg tablet   • fluticasone (FLONASE) 50 mcg/act nasal spray   • Fluticasone-Salmeterol (Wixela Inhub) 500-50 mcg/dose inhaler   • furosemide (LASIX) 40 mg tablet   • gabapentin (NEURONTIN) 300 mg capsule   • metoprolol succinate (TOPROL-XL) 25 mg 24 hr tablet   • oxygen gas   • potassium chloride (Klor-Con) 10 mEq tablet   • rosuvastatin (CRESTOR) 40 MG tablet   • Tiotropium Bromide Monohydrate (SPIRIVA RESPIMAT IN)     Allergies   Allergen Reactions   • Lisinopril Swelling and Cough   • Tetanus Antitoxin Anaphylaxis   • Tetanus Toxoid Anaphylaxis and Swelling       Vitals: Blood pressure 110/56, pulse 100, temperature (!) 97 3 °F (36 3 °C), temperature source Temporal, resp  rate 20, height 5' 8" (1 727 m), weight 93 2 kg (205 lb 7 5 oz), SpO2 92 %  , 4 lpm of supplemental oxygen, Body mass index is 31 24 kg/m²  Intake/Output Summary (Last 24 hours) at 3/16/2023 1504  Last data filed at 3/16/2023 0854  Gross per 24 hour   Intake 4141 25 ml   Output 2025 ml   Net 2116 25 ml       Physical Exam  Vitals and nursing note reviewed  Constitutional:       General: He is not in acute distress  Appearance: He is well-developed  HENT:      Head: Normocephalic and atraumatic  Eyes:      Conjunctiva/sclera: Conjunctivae normal    Cardiovascular:      Rate and Rhythm: Normal rate and regular rhythm  Heart sounds: No murmur heard  Pulmonary:      Effort: Pulmonary effort is normal  No respiratory distress  Comments: Distant lung sounds  Abdominal:      Palpations: Abdomen is soft  Tenderness: There is no abdominal tenderness  Musculoskeletal:         General: No swelling  Cervical back: Neck supple  Skin:     General: Skin is warm and dry  Capillary Refill: Capillary refill takes less than 2 seconds  Neurological:      Mental Status: He is alert  Psychiatric:         Mood and Affect: Mood normal          Labs: I have personally reviewed pertinent lab results    Laboratory and Diagnostics  Results from last 7 days   Lab Units 03/16/23  0426 03/15/23  1637   WBC Thousand/uL 11 58* 15 85*   HEMOGLOBIN g/dL 13 1 15 8   HEMATOCRIT % 41 5 50 7*   PLATELETS Thousands/uL 202 275   NEUTROS PCT %  --  81*   MONOS PCT %  --  12     Results from last 7 days   Lab Units 03/16/23  0426 03/15/23  1637   SODIUM mmol/L 139 140   POTASSIUM mmol/L 3 3* 4 0   CHLORIDE mmol/L 101 95*   CO2 mmol/L 31 31   ANION GAP mmol/L 7 14*   BUN mg/dL 12 13   CREATININE mg/dL 0 99 1 13   CALCIUM mg/dL 8 3* 9 7   GLUCOSE RANDOM mg/dL 89 124   ALT U/L 30 31   AST U/L 28 30   ALK PHOS U/L 58 75   ALBUMIN g/dL 3 1* 3 8   TOTAL BILIRUBIN mg/dL 0 60 0 65     Results from last 7 days   Lab Units 03/16/23  0426   MAGNESIUM mg/dL 2 0   PHOSPHORUS mg/dL 2 6      Results from last 7 days   Lab Units 03/15/23  1637   INR  1 03   PTT seconds 28          Results from last 7 days   Lab Units 03/15/23  1859 03/15/23  1637   LACTIC ACID mmol/L 1 3 3 6*                     Results from last 7 days   Lab Units 03/16/23  0426 03/15/23  1637   PROCALCITONIN ng/ml 0 51* 0 17       Microbiology:  Results from last 7 days   Lab Units 03/15/23  2223 03/15/23  2025 03/15/23  1637   BLOOD CULTURE   --   --  Received in Microbiology Lab  Culture in Progress  Received in Microbiology Lab  Culture in Progress  GRAM STAIN RESULT  Rare Epithelial cells per low power field*  No polys seen*  2+ Gram positive cocci in pairs*  2+ Gram positive rods*  2+ Gram negative rods*  --   --    STREP PNEUMONIAE ANTIGEN, URINE   --  Negative  --    LEGIONELLA URINARY ANTIGEN   --  Negative  --        ABG: No results found for: PHART, PQP4GUT, PO2ART, KSG2EIC, Z7SGQSMS, BEART, SOURCE      Imaging and other studies: I have personally reviewed pertinent reports  and I have personally reviewed pertinent films in PACS     XR chest 1 view portable  Result Date: 3/15/2023  Impression: Persistent bibasilar pneumonia, greater on the right  Severe emphysema  CXR 3/13/23  1  Bibasilar airspace opacities which appear similar to prior radiograph 2/23/2023   2   Trace right pleural effusion  CXR 2/23/23  Right chest tube has been removed  Subcutaneous emphysema at the right chest wall  Cardiomediastinal silhouette appears unremarkable  Bibasilar discoid atelectasis  No definite pneumothorax  No pleural effusion  CT Chest 2/22/23  Severe emphysema  1  Minimal residual right pneumothorax with chest tube in place  Right anterior and lateral chest wall subcutaneous emphysema  2   Bilateral lower lobe dense consolidation, which may represent atelectasis or pneumonia    3   Left lower lobe solid nodule measuring 5 mm, new since 2/28/2022  Pulmonary function testing: None available to review    EKG, Pathology, and Other Studies: I have personally reviewed pertinent reports  Code Status: Level 1 - Full Code    TTE 2/3/23  •  Left Ventricle: Left ventricular cavity size is upper normal  Wall thickness is mildly increased  The left ventricular ejection fraction is 40%  Systolic function is moderately reduced  There is moderate global hypokinesis with regional variation  •  Left Atrium: The atrium is mild-moderately dilated  •  Aortic Valve: There is mild regurgitation  There is mild stenosis  •  Mitral Valve: There is moderate regurgitation  •  Tricuspid Valve: There is mild regurgitation        Mark Zambrano MD  Attending Physician  Pulmonary and Critical Care Medicine

## 2023-03-16 NOTE — ASSESSMENT & PLAN NOTE
Lab Results   Component Value Date    EGFR 72 03/16/2023    EGFR 61 03/15/2023    EGFR 72 02/23/2023    CREATININE 0 99 03/16/2023    CREATININE 1 13 03/15/2023    CREATININE 0 99 02/23/2023     · Creatinine appears to be at baseline  · Hypokalemia-potassium replaced    We will repeat BMP in the morning

## 2023-03-16 NOTE — ASSESSMENT & PLAN NOTE
· Without acute exacerbation   · Patient's family to bring in home meds Spiriva and [employer]

## 2023-03-16 NOTE — NURSING NOTE
Pt newly c/o chest pain under left breast  Pt states its a pressure feeling, 3/10 pain  /73   on tele monitor  Resp 20  O2 saturation currently 93% on 4L O2 NC at rest  Dr Beth Alcazar aware  EKG and trops ordered  Chest pain lasted for about 5 mins

## 2023-03-16 NOTE — PROGRESS NOTES
Jami Hensley is a 65 y o  male who is currently ordered Vancomycin IV with management by the Pharmacy Consult service  Relevant clinical data and objective / subjective history reviewed  Vancomycin Assessment:  Indication and Goal AUC/Trough: Pneumonia (goal -600, trough >10), -600, trough >10  Clinical Status: stable  Micro:     Renal Function:  SCr: 0 99 mg/dL  CrCl: 68 1 mL/min  Renal replacement: Not on dialysis  Days of Therapy: 2  Current Dose: 1500 mgq 24  Vancomycin Plan:  New Dosin mg IV q24  Estimated AUC: 448 mcg*hr/mL  Estimated Trough: 12 8 mcg/mL  Next Level: 3/18 6am  Renal Function Monitoring: Daily BMP and UOP  Pharmacy will continue to follow closely for s/sx of nephrotoxicity, infusion reactions and appropriateness of therapy  BMP and CBC will be ordered per protocol  We will continue to follow the patient’s culture results and clinical progress daily      Radha Marcial, Pharmacist

## 2023-03-16 NOTE — ASSESSMENT & PLAN NOTE
· Present on admission with leukocytosis, tachycardia, tachypnea, lactic acid of 3 6 in the setting of pneumonia  · CXR-bibasilar pneumonia greater on right  · COVID/Flu/RSV- negative  · Procalcitonin elevated, will continue to trend  · Lactate improved with IV fluids  · Will continue IV antibiotics with cefepime/vancomycin  · IV fluids as needed  · Strep pneumo/urine Legionella negative  · MRSA screen pending  · Blood Cx- pending, will check sputum culture  · Pulmonology consult

## 2023-03-16 NOTE — PROGRESS NOTES
[facility]  Progress Note - Issa Velez 1945, 65 y o  male MRN: [id number]  Unit/Bed#: -01 Encounter: [id number]  Primary Care Provider: Preet Lawrence DO   Date and time admitted to hospital: 3/15/2023  4:22 PM    * Sepsis due to pneumonia [facility])  Assessment & Plan  · Present on admission with leukocytosis, tachycardia, tachypnea, lactic acid of 3 6 in the setting of pneumonia  · CXR-bibasilar pneumonia greater on right  · COVID/Flu/RSV- negative  · Procalcitonin elevated, will continue to trend  · Lactate improved with IV fluids  · Will continue IV antibiotics with cefepime/vancomycin  · IV fluids as needed  · Strep pneumo/urine Legionella negative  · MRSA screen pending  · Blood Cx- pending, will check sputum culture  · Pulmonology consult    COPD (chronic obstructive pulmonary disease) ([facility] )  Assessment & Plan  · Without acute exacerbation   · Patient's family to bring in home meds Spiriva and Wixela     Acute on chronic respiratory failure with hypoxia ([facility] )  Assessment & Plan  · Patient is chronically on 2 L nasal cannula at home and also uses CPAP at bedtime  · In the ER patient was tachycardic and tachypneic  · Will continue oxygen and titrate as tolerated  Patient currently on 4 L nasal cannula  · Pulmonology consulted    Stage 3a chronic kidney disease [facility])  Assessment & Plan  Lab Results   Component Value Date    EGFR 72 03/16/2023    EGFR 61 03/15/2023    EGFR 72 02/23/2023    CREATININE 0 99 03/16/2023    CREATININE 1 13 03/15/2023    CREATININE 0 99 02/23/2023     · Creatinine appears to be at baseline  · Hypokalemia-potassium replaced  We will repeat BMP in the morning    Chest pain  Assessment & Plan  · Patient complaining of chest pain this morning  Troponins performed on admission with no significant rise or fall  · Will check EKG  · Chest pain improved with nebulizer    Possibly secondary to pneumonia and patient's underlying COPD  · Repeat troponins  · Nitro as needed    LAMBERTO on CPAP  Assessment & Plan  · Family to bring in CPAP from home    Hypertension  Assessment & Plan  · BP has improved  · Toprol resumed   · Continue IV fluids as needed    Coronary artery disease involving native coronary artery of native heart without angina pectoris  Assessment & Plan  · Continue statin and aspirin  · Continue Toprol  · Lasix held for now    VTE Prophylaxis:  Enoxaparin (Lovenox)    Patient Centered Rounds: I have performed bedside rounds with nursing staff today  Discussions with Specialists or Other Care Team Provider: yes  Education and Discussions with Family / Patient: Attempted to call patients wife Margie Borrero at number provided in chart with no answer    Current Length of Stay: 1 day(s)    Current Patient Status: Inpatient   Certification Statement: The patient will continue to require additional inpatient hospital stay due to Pneumonia    Discharge Plan: Discharge home once medically stable  Possibly in the next 24 to 48 hours    Code Status: Level 1 - Full Code    Subjective:   Patient with complaint of left-sided chest pain this morning  Improved with nebulizer  Troponins performed on admission with no significant elevation  EKG to be performed  Patient states he is feeling better currently    Objective:     Vitals:   Temp (24hrs), Av 7 °F (37 6 °C), Min:97 9 °F (36 6 °C), Max:101 2 °F (38 4 °C)    Temp:  [97 9 °F (36 6 °C)-101 2 °F (38 4 °C)] 100 °F (37 8 °C)  HR:  [] 112  Resp:  [18-26] 20  BP: ()/(53-75) 140/73  SpO2:  [88 %-98 %] 88 %  Body mass index is 31 24 kg/m²  Input and Output Summary (last 24 hours): Intake/Output Summary (Last 24 hours) at 3/16/2023 5622  Last data filed at 3/16/2023 0418  Gross per 24 hour   Intake 2800 ml   Output 1775 ml   Net 1025 ml       Physical Exam:   Physical Exam  Constitutional:       General: He is not in acute distress  Appearance: He is obese     HENT:      Head: Normocephalic and atraumatic  Nose: Nose normal       Mouth/Throat:      Mouth: Mucous membranes are moist    Eyes:      Extraocular Movements: Extraocular movements intact  Conjunctiva/sclera: Conjunctivae normal    Cardiovascular:      Rate and Rhythm: Regular rhythm  Tachycardia present  Pulmonary:      Breath sounds: Rhonchi present  Comments: Tachypneic  Abdominal:      Palpations: Abdomen is soft  Tenderness: There is no abdominal tenderness  Musculoskeletal:         General: Normal range of motion  Cervical back: Normal range of motion and neck supple  Comments: Generalized weakness   Skin:     General: Skin is warm and dry  Neurological:      General: No focal deficit present  Mental Status: He is alert  Mental status is at baseline  Cranial Nerves: No cranial nerve deficit  Psychiatric:         Mood and Affect: Mood normal          Behavior: Behavior normal          Additional Data:     Labs:    Results from last 7 days   Lab Units 03/16/23  0426 03/15/23  1637   WBC Thousand/uL 11 58* 15 85*   HEMOGLOBIN g/dL 13 1 15 8   HEMATOCRIT % 41 5 50 7*   PLATELETS Thousands/uL 202 275   NEUTROS PCT %  --  81*   LYMPHS PCT %  --  6*   MONOS PCT %  --  12   EOS PCT %  --  1     Results from last 7 days   Lab Units 03/16/23  0426   SODIUM mmol/L 139   POTASSIUM mmol/L 3 3*   CHLORIDE mmol/L 101   CO2 mmol/L 31   BUN mg/dL 12   CREATININE mg/dL 0 99   CALCIUM mg/dL 8 3*   ALK PHOS U/L 58   ALT U/L 30   AST U/L 28     Results from last 7 days   Lab Units 03/15/23  1637   INR  1 03               * I Have Reviewed All Lab Data Listed Above  * Additional Pertinent Lab Tests Reviewed: Eric 65 Admission  Reviewed    Imaging:  Imaging Reports Reviewed Today Include: No new imaging    Recent Cultures (last 7 days):     Results from last 7 days   Lab Units 03/15/23  2025 03/15/23  1637   BLOOD CULTURE   --  Received in Microbiology Lab  Culture in Progress  Received in Microbiology Lab  Culture in Progress  LEGIONELLA URINARY ANTIGEN  Negative  --        Last 24 Hours Medication List:   Current Facility-Administered Medications   Medication Dose Route Frequency Provider Last Rate   • acetaminophen  650 mg Oral Q4H PRN Aggie Almodovar PA-C     • albuterol  2 5 mg Nebulization Q6H PRN Aggie Almodovar PA-C     • aspirin  81 mg Oral Daily Aggie Almodovar PA-C     • atorvastatin  80 mg Oral Daily With [place] Funes     • cefepime  2,000 mg Intravenous Q12H Aggie Almodovar PA-C 2,000 mg (03/16/23 7908)   • enoxaparin  40 mg Subcutaneous Daily Aggie Almodovar PA-C     • famotidine  20 mg Oral HS Aggie Almodovar PA-C     • Fluticasone-Salmeterol  1 puff Inhalation BID Aggie Almodovar PA-C     • gabapentin  300 mg Oral BID Aggie Almodovar PA-C     • guaiFENesin  600 mg Oral BID Aggie Almodovar PA-C     • levalbuterol  1 25 mg Nebulization TID Shama Carlson MD     • metoprolol succinate  12 5 mg Oral BID Ann Gómez MD     • ondansetron  4 mg Intravenous Q6H PRN Aggie Almodovar PA-C     • potassium chloride  20 mEq Oral Daily Aggie Almodovar PA-C     • potassium chloride  40 mEq Oral Once Ann Gómez MD     • tiotropium  1 puff Inhalation Daily Aggie Almodovar PA-C     • vancomycin  15 mg/kg Intravenous Q24H Aggie Almodovar PA-C          Today, Patient Was Seen By: Ann Gómez MD    ** Please Note: Dictation voice to text software may have been used in the creation of this document   **

## 2023-03-16 NOTE — PLAN OF CARE
Problem: Potential for Falls  Goal: Patient will remain free of falls  Description: INTERVENTIONS:  - Educate patient/family on patient safety including physical limitations  - Instruct patient to call for assistance with activity   - Consult OT/PT to assist with strengthening/mobility   - Keep Call bell within reach  - Keep bed low and locked with side rails adjusted as appropriate  - Keep care items and personal belongings within reach  - Initiate and maintain comfort rounds  - Make Fall Risk Sign visible to staff  - Offer Toileting every 2 Hours, in advance of need  - Initiate/Maintain bed/chair alarm  - Obtain necessary fall risk management equipment: non skid socks applied, bed in lowest and locked position, call bell within reach  - Apply yellow socks and bracelet for high fall risk patients  - Consider moving patient to room near nurses station  Outcome: Progressing     Problem: MOBILITY - ADULT  Goal: Maintain or return to baseline ADL function  Description: INTERVENTIONS:  -  Assess patient's ability to carry out ADLs; assess patient's baseline for ADL function and identify physical deficits which impact ability to perform ADLs (bathing, care of mouth/teeth, toileting, grooming, dressing, etc )  - Assess/evaluate cause of self-care deficits   - Assess range of motion  - Assess patient's mobility; develop plan if impaired  - Assess patient's need for assistive devices and provide as appropriate  - Encourage maximum independence but intervene and supervise when necessary  - Involve family in performance of ADLs  - Assess for home care needs following discharge   - Consider OT consult to assist with ADL evaluation and planning for discharge  - Provide patient education as appropriate  Outcome: Progressing  Goal: Maintains/Returns to pre admission functional level  Description: INTERVENTIONS:  - Perform BMAT or MOVE assessment daily    - Set and communicate daily mobility goal to care team and patient/family/caregiver  - Collaborate with rehabilitation services on mobility goals if consulted  - Perform Range of Motion 3 times a day  - Reposition patient every 2 hours    - Dangle patient 3 times a day  - Stand patient 3 times a day  - Ambulate patient 3 times a day  - Out of bed to chair 3 times a day   - Out of bed for meals 3 times a day  - Out of bed for toileting  - Record patient progress and toleration of activity level   Outcome: Progressing     Problem: PAIN - ADULT  Goal: Verbalizes/displays adequate comfort level or baseline comfort level  Description: Interventions:  - Encourage patient to monitor pain and request assistance  - Assess pain using appropriate pain scale  - Administer analgesics based on type and severity of pain and evaluate response  - Implement non-pharmacological measures as appropriate and evaluate response  - Consider cultural and social influences on pain and pain management  - Notify physician/advanced practitioner if interventions unsuccessful or patient reports new pain  Outcome: Progressing     Problem: INFECTION - ADULT  Goal: Absence or prevention of progression during hospitalization  Description: INTERVENTIONS:  - Assess and monitor for signs and symptoms of infection  - Monitor lab/diagnostic results  - Monitor all insertion sites, i e  indwelling lines, tubes, and drains  - Monitor endotracheal if appropriate and nasal secretions for changes in amount and color  - [place] appropriate cooling/warming therapies per order  - Administer medications as ordered  - Instruct and encourage patient and family to use good hand hygiene technique  - Identify and instruct in appropriate isolation precautions for identified infection/condition  Outcome: Progressing  Goal: Absence of fever/infection during neutropenic period  Description: INTERVENTIONS:  - Monitor WBC    Outcome: Progressing     Problem: DISCHARGE PLANNING  Goal: Discharge to home or other facility with appropriate resources  Description: INTERVENTIONS:  - Identify barriers to discharge w/patient and caregiver  - Arrange for needed discharge resources and transportation as appropriate  - Identify discharge learning needs (meds, wound care, etc )  - Arrange for interpretive services to assist at discharge as needed  - Refer to Case Management Department for coordinating discharge planning if the patient needs post-hospital services based on physician/advanced practitioner order or complex needs related to functional status, cognitive ability, or social support system  Outcome: Progressing     Problem: Knowledge Deficit  Goal: Patient/family/caregiver demonstrates understanding of disease process, treatment plan, medications, and discharge instructions  Description: Complete learning assessment and assess knowledge base    Interventions:  - Provide teaching at level of understanding  - Provide teaching via preferred learning methods  Outcome: Progressing

## 2023-03-17 ENCOUNTER — DOCUMENTATION (OUTPATIENT)
Dept: HEMATOLOGY ONCOLOGY | Facility: CLINIC | Age: 78
End: 2023-03-17

## 2023-03-17 VITALS
OXYGEN SATURATION: 94 % | HEIGHT: 68 IN | HEART RATE: 98 BPM | WEIGHT: 205.47 LBS | DIASTOLIC BLOOD PRESSURE: 64 MMHG | SYSTOLIC BLOOD PRESSURE: 124 MMHG | BODY MASS INDEX: 31.14 KG/M2 | TEMPERATURE: 99.1 F | RESPIRATION RATE: 18 BRPM

## 2023-03-17 LAB
ANION GAP SERPL CALCULATED.3IONS-SCNC: 8 MMOL/L (ref 4–13)
ATRIAL RATE: 112 BPM
BASOPHILS # BLD MANUAL: 0 THOUSAND/UL (ref 0–0.1)
BASOPHILS NFR MAR MANUAL: 0 % (ref 0–1)
BUN SERPL-MCNC: 13 MG/DL (ref 5–25)
CALCIUM SERPL-MCNC: 8.3 MG/DL (ref 8.4–10.2)
CHLORIDE SERPL-SCNC: 104 MMOL/L (ref 96–108)
CO2 SERPL-SCNC: 26 MMOL/L (ref 21–32)
CREAT SERPL-MCNC: 0.82 MG/DL (ref 0.6–1.3)
EOSINOPHIL # BLD MANUAL: 0 THOUSAND/UL (ref 0–0.4)
EOSINOPHIL NFR BLD MANUAL: 0 % (ref 0–6)
ERYTHROCYTE [DISTWIDTH] IN BLOOD BY AUTOMATED COUNT: 13 % (ref 11.6–15.1)
GFR SERPL CREATININE-BSD FRML MDRD: 84 ML/MIN/1.73SQ M
GLUCOSE SERPL-MCNC: 153 MG/DL (ref 65–140)
HCT VFR BLD AUTO: 39.3 % (ref 36.5–49.3)
HGB BLD-MCNC: 12.3 G/DL (ref 12–17)
LYMPHOCYTES # BLD AUTO: 0.39 THOUSAND/UL (ref 0.6–4.47)
LYMPHOCYTES # BLD AUTO: 4 % (ref 14–44)
MAGNESIUM SERPL-MCNC: 2 MG/DL (ref 1.9–2.7)
MCH RBC QN AUTO: 31.6 PG (ref 26.8–34.3)
MCHC RBC AUTO-ENTMCNC: 31.3 G/DL (ref 31.4–37.4)
MCV RBC AUTO: 101 FL (ref 82–98)
MONOCYTES # BLD AUTO: 0.58 THOUSAND/UL (ref 0–1.22)
MONOCYTES NFR BLD: 6 % (ref 4–12)
MRSA NOSE QL CULT: NORMAL
NEUTROPHILS # BLD MANUAL: 8.76 THOUSAND/UL (ref 1.85–7.62)
NEUTS BAND NFR BLD MANUAL: 4 % (ref 0–8)
NEUTS SEG NFR BLD AUTO: 86 % (ref 43–75)
P AXIS: 66 DEGREES
PLATELET # BLD AUTO: 217 THOUSANDS/UL (ref 149–390)
PLATELET BLD QL SMEAR: ADEQUATE
PMV BLD AUTO: 9.7 FL (ref 8.9–12.7)
POTASSIUM SERPL-SCNC: 4.3 MMOL/L (ref 3.5–5.3)
PR INTERVAL: 158 MS
PROCALCITONIN SERPL-MCNC: 0.37 NG/ML
QRS AXIS: 64 DEGREES
QRSD INTERVAL: 92 MS
QT INTERVAL: 344 MS
QTC INTERVAL: 469 MS
RBC # BLD AUTO: 3.89 MILLION/UL (ref 3.88–5.62)
RBC MORPH BLD: NORMAL
SODIUM SERPL-SCNC: 138 MMOL/L (ref 135–147)
T WAVE AXIS: 59 DEGREES
VENTRICULAR RATE: 112 BPM
WBC # BLD AUTO: 9.73 THOUSAND/UL (ref 4.31–10.16)

## 2023-03-17 RX ORDER — AMOXICILLIN AND CLAVULANATE POTASSIUM 875; 125 MG/1; MG/1
1 TABLET, FILM COATED ORAL EVERY 12 HOURS SCHEDULED
Qty: 14 TABLET | Refills: 0 | Status: SHIPPED | OUTPATIENT
Start: 2023-03-17 | End: 2023-03-24

## 2023-03-17 RX ORDER — POTASSIUM CHLORIDE 750 MG/1
10 TABLET, EXTENDED RELEASE ORAL 2 TIMES DAILY
Status: DISCONTINUED | OUTPATIENT
Start: 2023-03-17 | End: 2023-03-17 | Stop reason: HOSPADM

## 2023-03-17 RX ORDER — PREDNISONE 20 MG/1
40 TABLET ORAL DAILY
Qty: 10 TABLET | Refills: 0 | Status: SHIPPED | OUTPATIENT
Start: 2023-03-18 | End: 2023-03-23

## 2023-03-17 RX ADMIN — TIOTROPIUM BROMIDE INHALATION SPRAY 1 PUFF: 3.12 SPRAY, METERED RESPIRATORY (INHALATION) at 09:29

## 2023-03-17 RX ADMIN — POTASSIUM CHLORIDE 10 MEQ: 750 TABLET, EXTENDED RELEASE ORAL at 17:44

## 2023-03-17 RX ADMIN — LEVALBUTEROL HYDROCHLORIDE 1.25 MG: 1.25 SOLUTION RESPIRATORY (INHALATION) at 13:27

## 2023-03-17 RX ADMIN — GABAPENTIN 300 MG: 300 CAPSULE ORAL at 09:26

## 2023-03-17 RX ADMIN — GUAIFENESIN 600 MG: 600 TABLET ORAL at 09:26

## 2023-03-17 RX ADMIN — ENOXAPARIN SODIUM 40 MG: 100 INJECTION SUBCUTANEOUS at 09:26

## 2023-03-17 RX ADMIN — ATORVASTATIN CALCIUM 80 MG: 80 TABLET, FILM COATED ORAL at 15:42

## 2023-03-17 RX ADMIN — CEFEPIME HYDROCHLORIDE 2000 MG: 2 INJECTION, SOLUTION INTRAVENOUS at 04:55

## 2023-03-17 RX ADMIN — PREDNISONE 40 MG: 20 TABLET ORAL at 09:26

## 2023-03-17 RX ADMIN — LEVALBUTEROL HYDROCHLORIDE 1.25 MG: 1.25 SOLUTION RESPIRATORY (INHALATION) at 07:32

## 2023-03-17 RX ADMIN — GUAIFENESIN 600 MG: 600 TABLET ORAL at 17:44

## 2023-03-17 RX ADMIN — POTASSIUM CHLORIDE 10 MEQ: 750 TABLET, EXTENDED RELEASE ORAL at 09:26

## 2023-03-17 RX ADMIN — VANCOMYCIN HYDROCHLORIDE 1750 MG: 1 INJECTION, POWDER, LYOPHILIZED, FOR SOLUTION INTRAVENOUS at 16:36

## 2023-03-17 RX ADMIN — CEFEPIME HYDROCHLORIDE 2000 MG: 2 INJECTION, SOLUTION INTRAVENOUS at 15:42

## 2023-03-17 RX ADMIN — GABAPENTIN 300 MG: 300 CAPSULE ORAL at 17:44

## 2023-03-17 RX ADMIN — FLUTICASONE PROPIONATE AND SALMETEROL 1 PUFF: 500; 50 POWDER RESPIRATORY (INHALATION) at 09:28

## 2023-03-17 NOTE — PLAN OF CARE
Problem: Potential for Falls  Goal: Patient will remain free of falls  Description: INTERVENTIONS:  - Educate patient/family on patient safety including physical limitations  - Instruct patient to call for assistance with activity   - Consult OT/PT to assist with strengthening/mobility   - Keep Call bell within reach  - Keep bed low and locked with side rails adjusted as appropriate  - Keep care items and personal belongings within reach  - Initiate and maintain comfort rounds  - Make Fall Risk Sign visible to staff  - Offer Toileting every 2 Hours, in advance of need  - Initiate/Maintain bed alarm  - Obtain necessary fall risk management equipment  - Apply yellow socks and bracelet for high fall risk patients  - Consider moving patient to room near nurses station  Outcome: Progressing     Problem: MOBILITY - ADULT  Goal: Maintain or return to baseline ADL function  Description: INTERVENTIONS:  -  Assess patient's ability to carry out ADLs; assess patient's baseline for ADL function and identify physical deficits which impact ability to perform ADLs (bathing, care of mouth/teeth, toileting, grooming, dressing, etc )  - Assess/evaluate cause of self-care deficits   - Assess range of motion  - Assess patient's mobility; develop plan if impaired  - Assess patient's need for assistive devices and provide as appropriate  - Encourage maximum independence but intervene and supervise when necessary  - Involve family in performance of ADLs  - Assess for home care needs following discharge   - Consider OT consult to assist with ADL evaluation and planning for discharge  - Provide patient education as appropriate  Outcome: Progressing  Goal: Maintains/Returns to pre admission functional level  Description: INTERVENTIONS:  - Perform BMAT or MOVE assessment daily    - Set and communicate daily mobility goal to care team and patient/family/caregiver     - Collaborate with rehabilitation services on mobility goals if consulted  - Perform Range of Motion 2 times a day  - Reposition patient every 2 hours    - Dangle patient 2 times a day  - Stand patient 2 times a day  - Ambulate patient 2 times a day  - Out of bed to chair 3 times a day   - Out of bed for meals 3 times a day  - Out of bed for toileting  - Record patient progress and toleration of activity level   Outcome: Progressing     Problem: PAIN - ADULT  Goal: Verbalizes/displays adequate comfort level or baseline comfort level  Description: Interventions:  - Encourage patient to monitor pain and request assistance  - Assess pain using appropriate pain scale  - Administer analgesics based on type and severity of pain and evaluate response  - Implement non-pharmacological measures as appropriate and evaluate response  - Consider cultural and social influences on pain and pain management  - Notify physician/advanced practitioner if interventions unsuccessful or patient reports new pain  Outcome: Progressing     Problem: INFECTION - ADULT  Goal: Absence or prevention of progression during hospitalization  Description: INTERVENTIONS:  - Assess and monitor for signs and symptoms of infection  - Monitor lab/diagnostic results  - Monitor all insertion sites, i e  indwelling lines, tubes, and drains  - Monitor endotracheal if appropriate and nasal secretions for changes in amount and color  - [place] appropriate cooling/warming therapies per order  - Administer medications as ordered  - Instruct and encourage patient and family to use good hand hygiene technique  - Identify and instruct in appropriate isolation precautions for identified infection/condition  Outcome: Progressing     Problem: DISCHARGE PLANNING  Goal: Discharge to home or other facility with appropriate resources  Description: INTERVENTIONS:  - Identify barriers to discharge w/patient and caregiver  - Arrange for needed discharge resources and transportation as appropriate  - Identify discharge learning needs (meds, wound care, etc )  - Arrange for interpretive services to assist at discharge as needed  - Refer to Case Management Department for coordinating discharge planning if the patient needs post-hospital services based on physician/advanced practitioner order or complex needs related to functional status, cognitive ability, or social support system  Outcome: Progressing     Problem: Knowledge Deficit  Goal: Patient/family/caregiver demonstrates understanding of disease process, treatment plan, medications, and discharge instructions  Description: Complete learning assessment and assess knowledge base    Interventions:  - Provide teaching at level of understanding  - Provide teaching via preferred learning methods  Outcome: Progressing     Problem: RESPIRATORY - ADULT  Goal: Achieves optimal ventilation and oxygenation  Description: INTERVENTIONS:  - Assess for changes in respiratory status  - Assess for changes in mentation and behavior  - Position to facilitate oxygenation and minimize respiratory effort  - Oxygen administered by appropriate delivery if ordered  - Initiate smoking cessation education as indicated  - Encourage broncho-pulmonary hygiene including cough, deep breathe, Incentive Spirometry  - Assess the need for suctioning and aspirate as needed  - Assess and instruct to report SOB or any respiratory difficulty  - Respiratory Therapy support as indicated  Outcome: Progressing     Problem: SKIN/TISSUE INTEGRITY - ADULT  Goal: Skin Integrity remains intact(Skin Breakdown Prevention)  Description: Assess:  -Perform Vasiliy assessment every shift  -Clean and moisturize skin every  -Inspect skin when repositioning, toileting, and assisting with ADLS  -Assess under medical devices such as every  -Assess extremities for adequate circulation and sensation     Bed Management:  -Have minimal linens on bed & keep smooth, unwrinkled  -Change linens as needed when moist or perspiring  -Avoid sitting or lying in one position for more than  hours while in bed  -Keep HOB at [address]:  -Offer bedside commode  -Assess for incontinence every episode  -Use incontinent care products after each incontinent episode such as nutrashield    Activity:  -Mobilize patient 3 times a day  -Encourage activity and walks on unit  -Encourage or provide ROM exercises   -Turn and reposition patient every 2 Hours  -Use appropriate equipment to lift or move patient in bed  -Instruct/ Assist with weight shifting every shifr when out of bed in chair  -Consider limitation of chair time 2 hour intervals    Skin Care:  -Avoid use of baby powder, tape, friction and shearing, hot water or constrictive clothing  -Relieve pressure over bony prominences using   -Do not massage red bony areas    Next Steps:  -Teach patient strategies to minimize risks such as    -Consider consults to  interdisciplinary teams such as   Outcome: Progressing

## 2023-03-17 NOTE — DISCHARGE SUMMARY
Martina U  65   Discharge- Earnestine Pun 1945, 65 y o  male MRN: [id number]  Unit/Bed#: -01 Encounter: [id number]  Primary Care Provider: Katie Benitez DO   Date and time admitted to hospital: 3/15/2023  4:22 PM    COPD (chronic obstructive pulmonary disease) ([facility] [address] )  Assessment & Plan  · With possible exacerbation  · Discharge home on prednisone 40 mg x 5 days  · Continue Spiriva and Wixela     Acute on chronic respiratory failure with hypoxia ([facility] [address] )  Assessment & Plan  · Present on admission in the setting of air hunger and use of accessory muscles of respiration as well as inability to speak in full sentences  · Patient is chronically on 2 L nasal cannula at home and also uses CPAP at bedtime  · In the ER patient was tachycardic and tachypneic  · Will continue oxygen and titrate as tolerated  Patient currently on 4 L nasal cannula  · Pulmonology consulted    Stage 3a chronic kidney disease [facility])  Assessment & Plan  Lab Results   Component Value Date    EGFR 84 03/17/2023    EGFR 72 03/16/2023    EGFR 61 03/15/2023    CREATININE 0 82 03/17/2023    CREATININE 0 99 03/16/2023    CREATININE 1 13 03/15/2023     · Creatinine appears to be at baseline  · Hypokalemia-potassium replaced  We will repeat BMP in the morning    Chest pain  Assessment & Plan  · Patient complaining of chest pain this morning  Troponins performed on admission with no significant rise or fall  · Will check EKG  · Chest pain improved with nebulizer    Possibly secondary to pneumonia and patient's underlying COPD  · Repeat troponins  · Nitro as needed    LAMBERTO on CPAP  Assessment & Plan  · Family to bring in CPAP from home    Hypertension  Assessment & Plan  · BP has improved  · Toprol resumed   · Continue IV fluids as needed    Coronary artery disease involving native coronary artery of native heart without angina pectoris  Assessment & Plan  · Continue statin and aspirin  · Continue Toprol  · Lasix held for now    * Sepsis due to pneumonia [facility])  Assessment & Plan  · Possibly due to gram-negative bacteria secondary to recent hospital admission  · Initiated on vancomycin and cefepime  · Present on admission with leukocytosis, tachycardia, tachypnea, lactic acid of 3 6 in the setting of pneumonia  · CXR-bibasilar pneumonia greater on right  · COVID/Flu/RSV- negative  · Procalcitonin elevated, will continue to trend  · Lactate improved with IV fluids  · Will continue IV antibiotics with cefepime/vancomycin  · IV fluids as needed  · Strep pneumo/urine Legionella negative  · MRSA screen pending  · Blood Cx- pending, will check sputum culture  · Pulmonology consult  · We will discharge home on Augmentin    Discharging Physician / Practitioner: Cade Calzada DO  PCP: Ashli Roque DO  Admission Date:   Admission Orders (From admission, onward)     Ordered        03/15/23 1708  INPATIENT ADMISSION  Once                      Discharge Date: 03/17/23    Medical Problems     Resolved Problems  Date Reviewed: 3/17/2023   None         Consultations During Hospital Stay: Pulmonology    Procedures Performed: Not applicable    Significant Findings / Test Results:     XR chest 1 view portable    Result Date: 3/15/2023  Impression: Persistent bibasilar pneumonia, greater on the right  Severe emphysema  Workstation performed: [id number]       Incidental Findings: Not applicable    Test Results Pending at Discharge (will require follow up): Not applicable     Outpatient Tests Requested: Not applicable    Reason for Admission: Shortness of breath    Hospital Course:     Stephanie Barker is a 65 y o  male with a PMH of COPD, chronic respiratory failure, HTN, prostate cancer, LAMBERTO who presents with a complaint of shortness of breath  Patient reports increased shortness of breath for last 2 days  He reports being on chronic oxygen 2 L which she had increased to 4 L over the last 2 days    He reports a productive cough with thick yellowish-green sputum  She reports fevers and chills at home  Chest x-ray from the ED still pending read however ED attending was concerned for pneumonia  The patient was noted to meet sepsis criteria with a leukocytosis of 15,000, a lactic acidosis of 3 6, tachycardia, and tachypnea  Patient's blood pressure was also noted to be low with a systolic blood pressure in the 90s  The patient denies any headaches, chest pain, abdominal pain, nausea/vomiting, or diarrhea  The patient remained hemodynamically stable and saturating on baseline O2 requirement  The patient was transitioned to Augmentin in the setting of pneumonia as well as prednisone in the setting of COPD exacerbation  Prescriptions were sent to the patient's pharmacy  He was strongly encouraged to resume all preadmission medications at all preadmission dosages  He was also encouraged to follow-up with his pulmonologist within 7-14 days  His daughter will provide transportation home  Condition at Discharge: stable     Discharge Day Visit / Exam:     Subjective:  Patient seen and examined at bedside  No acute events overnight  Denies chest pain, SOB, diaphoresis, nausea/vomiting/diarrhea, fevers/chills  Vitals: Blood Pressure: 124/64 (03/17/23 1441)  Pulse: 98 (03/17/23 1441)  Temperature: 99 1 °F (37 3 °C) (03/17/23 1441)  Temp Source: Temporal (03/16/23 1500)  Respirations: 18 (03/17/23 0302)  Height: 5' 8" (172 7 cm) (03/15/23 1918)  Weight - Scale: 93 2 kg (205 lb 7 5 oz) (03/15/23 1918)  SpO2: 94 % (03/17/23 1441)     Exam:   Physical Exam  Vitals and nursing note reviewed  Constitutional:       General: He is not in acute distress  Appearance: He is well-developed  HENT:      Head: Normocephalic and atraumatic  Eyes:      Conjunctiva/sclera: Conjunctivae normal    Cardiovascular:      Rate and Rhythm: Normal rate and regular rhythm  Heart sounds: No murmur heard    Pulmonary:      Effort: Pulmonary effort is normal  No respiratory distress  Breath sounds: Normal breath sounds  Abdominal:      Palpations: Abdomen is soft  Tenderness: There is no abdominal tenderness  Musculoskeletal:         General: No swelling  Cervical back: Neck supple  Skin:     General: Skin is warm and dry  Capillary Refill: Capillary refill takes less than 2 seconds  Neurological:      Mental Status: He is alert  Psychiatric:         Mood and Affect: Mood normal            Discharge instructions/Information to patient and family:   See after visit summary for information provided to patient and family  Provisions for Follow-Up Care:  See after visit summary for information related to follow-up care and any pertinent home health orders  Disposition:     Home with family support  Outpatient follow-up with PCP/Pulmonology  Prednisone x5 days  Augmentin x7 days  Resume preadmission medications     Discharge Statement:  I spent 60 minutes discharging the patient  This time was spent on the day of discharge  I had direct contact with the patient on the day of discharge  Greater than 50% of the total time was spent examining patient, answering all patient questions, arranging and discussing plan of care with patient as well as directly providing post-discharge instructions  Additional time then spent on discharge activities  Discharge Medications:  See after visit summary for reconciled discharge medications provided to patient and family        ** Please Note: This note has been constructed using a voice recognition system **

## 2023-03-17 NOTE — ASSESSMENT & PLAN NOTE
· Patient complaining of chest pain this morning  Troponins performed on admission with no significant rise or fall  · Will check EKG  · Chest pain improved with nebulizer    Possibly secondary to pneumonia and patient's underlying COPD  · Repeat troponins  · Nitro as needed

## 2023-03-17 NOTE — PLAN OF CARE
Problem: RESPIRATORY - ADULT  Goal: Achieves optimal ventilation and oxygenation  Description: INTERVENTIONS:  - Assess for changes in respiratory status  - Assess for changes in mentation and behavior  - Position to facilitate oxygenation and minimize respiratory effort  - Oxygen administered by appropriate delivery if ordered  - Initiate smoking cessation education as indicated  - Encourage broncho-pulmonary hygiene including cough, deep breathe, Incentive Spirometry  - Assess the need for suctioning and aspirate as needed  - Assess and instruct to report SOB or any respiratory difficulty  - Respiratory Therapy support as indicated  Outcome: Progressing        Problem: MOBILITY - ADULT  Goal: Maintain or return to baseline ADL function  Description: INTERVENTIONS:  -  Assess patient's ability to carry out ADLs; assess patient's baseline for ADL function and identify physical deficits which impact ability to perform ADLs (bathing, care of mouth/teeth, toileting, grooming, dressing, etc )  - Assess/evaluate cause of self-care deficits   - Assess range of motion  - Assess patient's mobility; develop plan if impaired  - Assess patient's need for assistive devices and provide as appropriate  - Encourage maximum independence but intervene and supervise when necessary  - Involve family in performance of ADLs  - Assess for home care needs following discharge   - Consider OT consult to assist with ADL evaluation and planning for discharge  - Provide patient education as appropriate  Outcome: Progressing  Goal: Maintains/Returns to pre admission functional level  Description: INTERVENTIONS:  - Perform BMAT or MOVE assessment daily    - Set and communicate daily mobility goal to care team and patient/family/caregiver  - Collaborate with rehabilitation services on mobility goals if consulted  - Perform Range of Motion 3 times a day  - Reposition patient every 2 hours    - Dangle patient 2 times a day  - Stand patient 3 times a day  - Ambulate patient 3 times a day  - Out of bed to chair 3 times a day   - Out of bed for meals 3 times a day  - Out of bed for toileting  - Record patient progress and toleration of activity level   Outcome: Progressing

## 2023-03-17 NOTE — PROGRESS NOTES
-- Patient:  -- MRN: [id number]  -- Aidin Request ID: [id number]  -- Level of care reserved: [facility]  -- Partner Reserved: [facility], [place], [address] [place] [address] [phone]  -- Clinical needs requested:  -- Geography searched: [phone]  -- Start of Service:  -- Request sent: 2:49pm EDT on 3/17/2023 by [employer]  -- Partner reserved: 3:27pm EDT on 3/17/2023 by [employer]  -- Choice list shared: 3:26pm EDT on 3/17/2023 by [employer]

## 2023-03-17 NOTE — PROGRESS NOTES
Stormy Hernandes is a 65 y o  male who is currently ordered Vancomycin IV with management by the Pharmacy Consult service  Relevant clinical data and objective / subjective history reviewed  Vancomycin Assessment:  Indication and Goal AUC/Trough: Pneumonia (goal -600, trough >10), -600, trough >10  Clinical Status: stable  Micro:     Renal Function:  SCr: 0 82 mg/dL  CrCl: 81 2 mL/min  Renal replacement: Not on dialysis  Days of Therapy: 3  Current Dose: 1500mg IV Q24hrs  Vancomycin Plan:  New Dosinmg IV Q24hrs  Estimated AUC: 456 mcg*hr/mL  Estimated Trough: 12 2 mcg/mL  Next Level: 3/18 with am labs   Renal Function Monitoring: Daily BMP and [place] will continue to follow closely for s/sx of nephrotoxicity, infusion reactions and appropriateness of therapy  BMP and CBC will be ordered per protocol  We will continue to follow the patient’s culture results and clinical progress daily      Barry Green, Pharmacist

## 2023-03-17 NOTE — ASSESSMENT & PLAN NOTE
· With possible exacerbation  · Discharge home on prednisone 40 mg x 5 days  · Continue [place] and [place]

## 2023-03-17 NOTE — ASSESSMENT & PLAN NOTE
· Present on admission in the setting of air hunger and use of accessory muscles of respiration as well as inability to speak in full sentences  · Patient is chronically on 2 L nasal cannula at home and also uses CPAP at bedtime  · In the ER patient was tachycardic and tachypneic  · Will continue oxygen and titrate as tolerated    Patient currently on 4 L nasal cannula  · Pulmonology consulted

## 2023-03-17 NOTE — PROGRESS NOTES
After reviewing chart and speaking with LEONCIO Cruz with pulmonology, patient is not yet cleared to proceed with [employer]  He has multiple comorbidities that are holding patient back form proceeding with procedure  Pulmonology is suggesting following up in 3-4 weeks and reassess patient  He has consult with Cardio Oncology on 4/7/23  I will follow up after that appt and get recommendations then

## 2023-03-17 NOTE — ASSESSMENT & PLAN NOTE
· Possibly due to gram-negative bacteria secondary to recent hospital admission  · Initiated on vancomycin and cefepime  · Present on admission with leukocytosis, tachycardia, tachypnea, lactic acid of 3 6 in the setting of pneumonia  · CXR-bibasilar pneumonia greater on right  · COVID/Flu/RSV- negative  · Procalcitonin elevated, will continue to trend  · Lactate improved with IV fluids  · Will continue IV antibiotics with cefepime/vancomycin  · IV fluids as needed  · Strep pneumo/urine Legionella negative  · MRSA screen pending  · Blood Cx- pending, will check sputum culture  · Pulmonology consult  · We will discharge home on Augmentin

## 2023-03-17 NOTE — ASSESSMENT & PLAN NOTE
Lab Results   Component Value Date    EGFR 84 03/17/2023    EGFR 72 03/16/2023    EGFR 61 03/15/2023    CREATININE 0 82 03/17/2023    CREATININE 0 99 03/16/2023    CREATININE 1 13 03/15/2023     · Creatinine appears to be at baseline  · Hypokalemia-potassium replaced    We will repeat BMP in the morning

## 2023-03-18 LAB
BACTERIA SPT RESP CULT: ABNORMAL
GRAM STN SPEC: ABNORMAL

## 2023-03-19 LAB
BACTERIA BLD CULT: NORMAL
BACTERIA BLD CULT: NORMAL

## 2023-03-20 ENCOUNTER — HOME CARE VISIT (OUTPATIENT)
Dept: HOME HEALTH SERVICES | Facility: HOME HEALTHCARE | Age: 78
End: 2023-03-20

## 2023-03-20 LAB
BACTERIA BLD CULT: NORMAL
BACTERIA BLD CULT: NORMAL

## 2023-03-20 NOTE — UTILIZATION REVIEW
NOTIFICATION OF ADMISSION DISCHARGE   This is a Notification of Discharge from [facility]  Please be advised that this patient has been discharge from our facility  Below you will find the admission and discharge date and time including the patient’s disposition  UTILIZATION REVIEW CONTACT:  [facility]  Utilization   Network Utilization Review Department  Phone: [phone] carefully listen to the prompts  All voicemails are confidential   Email: [email or web address]     ADMISSION INFORMATION  PRESENTATION DATE: 3/15/2023  4:22 PM  OBERVATION ADMISSION DATE:   INPATIENT ADMISSION DATE: 3/15/23  5:08 PM   DISCHARGE DATE: 3/17/2023  7:30 PM   DISPOSITION:Home/Self Care    IMPORTANT INFORMATION:  Send all requests for admission clinical reviews, approved or denied determinations and any other requests to dedicated fax number below belonging to the campus where the patient is receiving treatment   List of dedicated fax numbers:  [address] DENIALS (Administrative/Medical Necessity) [phone]   [address] (Maternity/NICU/Pediatrics) [phone]   [facility] [phone]   [place]  [phone]   [address] [place] [address]  [phone]   220 [facility] [phone]   [address] [facility] [phone]   [address] [address] [phone]   [facility]  [phone]   [address] [facility] [phone]   [address] [facility] [address] [facility] [phone]

## 2023-03-21 ENCOUNTER — OFFICE VISIT (OUTPATIENT)
Dept: FAMILY MEDICINE CLINIC | Facility: CLINIC | Age: 78
End: 2023-03-21

## 2023-03-21 ENCOUNTER — TRANSITIONAL CARE MANAGEMENT (OUTPATIENT)
Dept: FAMILY MEDICINE CLINIC | Facility: CLINIC | Age: 78
End: 2023-03-21

## 2023-03-21 ENCOUNTER — HOME CARE VISIT (OUTPATIENT)
Dept: HOME HEALTH SERVICES | Facility: HOME HEALTHCARE | Age: 78
End: 2023-03-21

## 2023-03-21 VITALS
OXYGEN SATURATION: 94 % | HEART RATE: 81 BPM | RESPIRATION RATE: 18 BRPM | SYSTOLIC BLOOD PRESSURE: 122 MMHG | TEMPERATURE: 97.1 F | DIASTOLIC BLOOD PRESSURE: 64 MMHG

## 2023-03-21 VITALS
HEIGHT: 68 IN | TEMPERATURE: 98.6 F | SYSTOLIC BLOOD PRESSURE: 130 MMHG | RESPIRATION RATE: 18 BRPM | DIASTOLIC BLOOD PRESSURE: 84 MMHG | OXYGEN SATURATION: 91 % | HEART RATE: 86 BPM | WEIGHT: 205.8 LBS | BODY MASS INDEX: 31.19 KG/M2

## 2023-03-21 DIAGNOSIS — E87.6 HYPOKALEMIA: ICD-10-CM

## 2023-03-21 DIAGNOSIS — J44.9 CHRONIC OBSTRUCTIVE PULMONARY DISEASE, UNSPECIFIED COPD TYPE (HCC): Primary | ICD-10-CM

## 2023-03-21 DIAGNOSIS — G47.33 OSA ON CPAP: ICD-10-CM

## 2023-03-21 DIAGNOSIS — C61 PROSTATE CANCER (HCC): ICD-10-CM

## 2023-03-21 DIAGNOSIS — I49.9 IRREGULAR HEART RHYTHM: ICD-10-CM

## 2023-03-21 DIAGNOSIS — Z99.89 OSA ON CPAP: ICD-10-CM

## 2023-03-21 DIAGNOSIS — R06.01 ORTHOPNEA: ICD-10-CM

## 2023-03-21 DIAGNOSIS — R60.0 LOWER EXTREMITY EDEMA: ICD-10-CM

## 2023-03-21 DIAGNOSIS — J96.21 ACUTE ON CHRONIC RESPIRATORY FAILURE WITH HYPOXIA (HCC): ICD-10-CM

## 2023-03-21 NOTE — Clinical Note
Hi  I admitted Shlomo to [facility] today and he was asking about a shower chair  I know he has an appt with you today so I told him to ask you if you could order that for him  Thanks     Adalberto Lindsay RN

## 2023-03-21 NOTE — Clinical Note
[facility] has admitted your patient to [address] [place] [address] service with the following disciplines:      SN  This report is informational only, no responses is needed  Primary focus of home health care pulmonary- pneumonia  Patient stated goals of care to be able to turn his oxygen back down to 2L  Anticipated visit pattern and next visit date 2w3, 1w2  See medication list - all meds in home   Potential barriers to goal achievement- weakness and increased CHACON    Thank you for allowing us to participate in the care of your patient        Afshan Hernandez RN

## 2023-03-21 NOTE — PROGRESS NOTES
Megan Berry 1945 male MRN: [id number]    Family Medicine Transition of Care Visit      SUBJECTIVE    CC: AGUDELO DANIE Visit     Transitional Care Management Review:  Megan Berry is a 65 y o  male here for TCM follow up  Admitted 3/15-3/17 for acute on chronic respiratory failure with hypoxia  Hospital course as per discharge summary as below:    "COPD (chronic obstructive pulmonary disease) ([facility] [address] )  Assessment & Plan  • With possible exacerbation  • Discharge home on prednisone 40 mg x 5 days  • Continue Spiriva and Wixela      Acute on chronic respiratory failure with hypoxia ([facility] [address] )  Assessment & Plan  • Present on admission in the setting of air hunger and use of accessory muscles of respiration as well as inability to speak in full sentences  • Patient is chronically on 2 L nasal cannula at home and also uses CPAP at bedtime  • In the ER patient was tachycardic and tachypneic  • Will continue oxygen and titrate as tolerated  Patient currently on 4 L nasal cannula  • Pulmonology consulted     Stage 3a chronic kidney disease [facility])  Assessment & Plan        Lab Results   Component Value Date     EGFR 84 03/17/2023     EGFR 72 03/16/2023     EGFR 61 03/15/2023     CREATININE 0 82 03/17/2023     CREATININE 0 99 03/16/2023     CREATININE 1 13 03/15/2023      • Creatinine appears to be at baseline  • Hypokalemia-potassium replaced  We will repeat BMP in the morning     Chest pain  Assessment & Plan  • Patient complaining of chest pain this morning  Troponins performed on admission with no significant rise or fall  • Will check EKG  • Chest pain improved with nebulizer    Possibly secondary to pneumonia and patient's underlying COPD  • Repeat troponins  • Nitro as needed     LAMBERTO on CPAP  Assessment & Plan  • Family to bring in CPAP from home     Hypertension  Assessment & Plan  • BP has improved  • Toprol resumed   • Continue IV fluids as needed     Coronary artery disease involving native coronary artery of native heart without angina pectoris  Assessment & Plan  • Continue statin and aspirin  • Continue Toprol  • Lasix held for now     * Sepsis due to pneumonia [facility])  Assessment & Plan  • Possibly due to gram-negative bacteria secondary to recent hospital admission  • Initiated on vancomycin and cefepime  • Present on admission with leukocytosis, tachycardia, tachypnea, lactic acid of 3 6 in the setting of pneumonia  • CXR-bibasilar pneumonia greater on right  • COVID/Flu/RSV- negative  • Procalcitonin elevated, will continue to trend  • Lactate improved with IV fluids  • Will continue IV antibiotics with cefepime/vancomycin  • IV fluids as needed  • Strep pneumo/urine Legionella negative  • MRSA screen pending  • Blood Cx- pending, will check sputum culture  • Pulmonology consult  • We will discharge home on Augmentin"    "Significant Findings / Test Results:      XR chest 1 view portable     Result Date: 3/15/2023  Impression: Persistent bibasilar pneumonia, greater on the right  Severe emphysema  Workstation performed: [id number]"    "Nathan Lopez a 65 y  o  male with a PMH of COPD, chronic respiratory failure, HTN, prostate cancer, LAMBERTO who presents with a complaint of shortness of breath   Patient reports increased shortness of breath for last 2 days  Karan Zamora reports being on chronic oxygen 2 L which she had increased to 4 L over the last 2 days  Karan Zamora reports a productive cough with thick yellowish-green sputum   She reports fevers and chills at home   Chest x-ray from the ED still pending read however ED attending was concerned for pneumonia   The patient was noted to meet sepsis criteria with a leukocytosis of 15,000, a lactic acidosis of 3 6, tachycardia, and tachypnea   Patient's blood pressure was also noted to be low with a systolic blood pressure in the 90s   The patient denies any headaches, chest pain, abdominal pain, nausea/vomiting, or diarrhea      The patient remained hemodynamically stable and saturating on baseline O2 requirement  The patient was transitioned to Augmentin in the setting of pneumonia as well as prednisone in the setting of COPD exacerbation  Prescriptions were sent to the patient's pharmacy  He was strongly encouraged to resume all preadmission medications at all preadmission dosages  He was also encouraged to follow-up with his pulmonologist within 7-14 days  His daughter will provide transportation home "    During the TCM phone call patient stated:    TCM Call     Date and time call was made  3/21/2023  7:54 AM    Patient was hospitialized at  [address]    Date of Admission  03/15/23    Date of discharge  03/17/23    Diagnosis  Sepsis due to pneumonia    Disposition  Home    Current Symptoms  None  needs a stitch removed    Cough Severity  Moderate    Shortness of breath severity  Moderate      TCM Call     Post hospital issues  None    Scheduled for follow up? Yes    Did you obtain your prescribed medications  Yes    Do you need help managing your prescriptions or medications  No    Is transportation to your appointment needed  No    I have advised the patient to call PCP with any new or worsening symptoms  Colette TREVINO    Living Arrangements  Children; Spouse or Significiant other    Support System  Children; Parent    The type of support provided  Emotional; Physical; Other (comment)    Do you have social support  Yes, as much as I need          During today's visit:    COPD- He is wearing 3-3 5 L NC, getting SOB with lying flat, has to prop the head of bed up  He feels his breathing is not much better, about the same  CHACON  No chest pains  Oxygen sats 88-92%  He has a follow-up with Dr Misael Chin in April  We discussed attempting to move up his appointment to a sooner date  He notices sometimes his pulse varies at home, pulse reading initially today was averaging 49-50, heart rate 96, 83, 89 at home  When exerting himself or when oxygen drops palpitations   EKG done in office today, shows sinus rhythm with frequent PVC and PAC  No significant changes when compared to prior  Will reach out to his cardiologist to see if they think a Holter monitor would be helpful  Lower extremity edema- Noticing since last 2 hospital admissions, no pain  Better in the AM, worse as day goes on  We discussed wearing compression socks daily, and will follow-up BMP to assess potassium and kidney function  We briefly discussed prostate cancer diagnosis and meeting with oncology and palliative care  They are complying with their medication changes and discharge instructions  They have the following questions: As above     Review of Systems   All other systems reviewed and are negative        Historical Information     The patient history was reviewed as follows:    Past Medical History:   Diagnosis Date   • Bilateral carotid artery stenosis    • Cancer (HCC)     skin   • Chronic diastolic heart failure (HCC)    • Chronic ischemic heart disease    • Chronic kidney disease     stage 3   • Colon polyp    • COPD (chronic obstructive pulmonary disease) (HCC)    • Coronary artery disease     hx stents, MI, PCI   • COVID 11/2021   • CPAP (continuous positive airway pressure) dependence    • Emphysema of lung ([facility] [address] ) 1995    started   • Hearing loss    • History of transfusion 1995   • Hyperlipidemia    • Hypertension    • Lung nodule 2023    x rays   • MI (myocardial infarction) ([address] ) 1995   • Myocardial infarction ([address] ) 1995   • Pneumonia    • Pneumothorax feb 20,2023    collapsed lung   • Prostate cancer ([facility] [address] )    • RSV (respiratory syncytial virus infection) 10/2022   • S/P carotid endarterectomy    • Shortness of breath     O2 2 l/nc PRN   • Sleep apnea    • Sleep apnea, obstructive    • Stented coronary artery      Past Surgical History:   Procedure Laterality Date   • APPENDECTOMY     • CARDIAC CATHETERIZATION  03/18/2021    left main with no significant disease, proximal LAD with 10% stenosis at the site of prior stent, left circumflex artery with minimal luminal irregularities mid RCA with 50% stenosis at site of prior stent and PL segment with distal disease supplied by collaterals from the distal circumflex with no significant CAD requiring revascularization at that time     • CATARACT EXTRACTION, BILATERAL Bilateral    • COLONOSCOPY     • CORONARY ANGIOPLASTY WITH STENT PLACEMENT      RCA   • CORONARY ANGIOPLASTY WITH STENT PLACEMENT      RCA   • CORONARY ANGIOPLASTY WITH STENT PLACEMENT      LAD   • EYE SURGERY     • ME BX PROSTATE STRTCTC SATURATION SAMPLING IMG GID N/A 2022    Procedure: TRANSPERINEAL MRI FUSION  BIOPSY PROSTATE;  Surgeon: Mary Carmen Winkler MD;  Location: [facility];  Service: Urology   • ME NEUROPLASTY &/TRANSPOS MEDIAN NRV Cynda Alamin Left 2022    Procedure: RELEASE CARPAL TUNNEL;  Surgeon: Latrice Jalloh MD;  Location: [facility];  Service: Orthopedics   • ME NEUROPLASTY &/TRANSPOSITION ULNAR NERVE ELBOW Left 2022    Procedure: RELEASE CUBITAL TUNNEL;  Surgeon: Latrice Jalloh MD;  Location: [facility];  Service: Orthopedics   • ME NEUROPLASTY &/TRANSPOSITION ULNAR NERVE WRIST Left 2022    Procedure: Nora Corrigan;  Surgeon: Latrice Jalloh MD;  Location: [facility];  Service: Orthopedics   • SKIN CANCER EXCISION  2012    chin-per pt, basal cell  also right ankle   • TONSILLECTOMY  no     Family History   Problem Relation Age of Onset   • Heart attack Mother    • Dementia Mother    • Heart failure Mother             • No Known Problems Father       Social History   Social History     Substance and Sexual Activity   Alcohol Use Never     Social History     Substance and Sexual Activity   Drug Use Never     Social History     Tobacco Use   Smoking Status Former   • Packs/day: 2 00   • Years: 35 00   • Pack years: 70 00   • Types: Cigarettes   • Start date: 1960   • Quit date: 1995   • Years since quittin 7   Smokeless Tobacco Never   Tobacco Comments    stopped 1995       Medications:   Meds/Allergies     Current Outpatient Medications:   •  albuterol (2 5 mg/3 mL) 0 083 % nebulizer solution, Take 3 mL (2 5 mg total) by nebulization every 6 (six) hours as needed for wheezing or shortness of breath, Disp: 60 mL, Rfl: 5  •  amoxicillin-clavulanate (AUGMENTIN) 875-125 mg per tablet, Take 1 tablet by mouth every 12 (twelve) hours for 7 days, Disp: 14 tablet, Rfl: 0  •  aspirin 81 mg chewable tablet, CHEW ONE TABLET BY MOUTH EVERY DAY, Disp: , Rfl:   •  Blood Pressure Monitor KIT, Use daily (Patient taking differently: Use 40 mg daily), Disp: 1 kit, Rfl: 0  •  dextromethorphan-guaiFENesin (ROBITUSSIN DM)  mg/5 mL syrup, Take 5 mL by mouth 3 (three) times a day as needed for cough, Disp: 354 mL, Rfl: 0  •  famotidine (PEPCID) 20 mg tablet, Take 1 tablet (20 mg total) by mouth daily at bedtime, Disp: 100 tablet, Rfl: 1  •  fluticasone (FLONASE) 50 mcg/act nasal spray, into each nostril as needed , Disp: , Rfl:   •  Fluticasone-Salmeterol (Wixela Inhub) 500-50 mcg/dose inhaler, Inhale 1 puff 2 (two) times a day Rinse mouth after use , Disp: 180 blister, Rfl: 3  •  furosemide (LASIX) 40 mg tablet, Take 1 tablet (40 mg total) by mouth daily, Disp: 100 tablet, Rfl: 3  •  gabapentin (NEURONTIN) 300 mg capsule, Take 1 capsule (300 mg total) by mouth 2 (two) times a day, Disp: , Rfl:   •  metoprolol succinate (TOPROL-XL) 25 mg 24 hr tablet, Take 0 5 tablets (12 5 mg total) by mouth 2 (two) times a day, Disp: 90 tablet, Rfl: 0  •  oxygen gas, Inhale 2 L/min continuous   2LPM at rest and 3LPM with activity per D Cedric FANG notes  Indications: SOB, pulmonary edema suspected, Disp: , Rfl:   •  potassium chloride (Klor-Con) 10 mEq tablet, Take 2 tablets (20 mEq total) by mouth daily, Disp: 200 tablet, Rfl: 3  •  predniSONE 20 mg tablet, Take 2 tablets (40 mg total) by mouth daily for 5 days Do not start before March 18, 2023 , Disp: 10 tablet, Rfl: 0  •  rosuvastatin (CRESTOR) 40 MG tablet, TAKE 1 TABLET DAILY (Patient taking differently: Take 40 mg by mouth daily at bedtime), Disp: 100 tablet, Rfl: 3  •  Tiotropium Bromide Monohydrate (SPIRIVA RESPIMAT IN), Inhale every morning, Disp: , Rfl:   •  azithromycin (ZITHROMAX) 500 MG tablet, Take 1 tablet (500 mg total) by mouth 3 (three) times a week (Patient not taking: Reported on 3/21/2023), Disp: 12 tablet, Rfl: 2  Allergies   Allergen Reactions   • Lisinopril Swelling and Cough   • Tetanus Antitoxin Anaphylaxis   • Tetanus Toxoid Anaphylaxis and Swelling       OBJECTIVE    Vitals:   Vitals:    03/21/23 1635   BP: 130/84   Pulse: 86   Resp: 18   Temp: 98 6 °F (37 °C)   SpO2: 91%   Weight: 93 4 kg (205 lb 12 8 oz)   Height: 5' 8" (1 727 m)       Body mass index is 31 29 kg/m²  Physical Exam:    Physical Exam  Vitals reviewed  Constitutional:       General: He is not in acute distress  Appearance: Normal appearance  He is not ill-appearing, toxic-appearing or diaphoretic  HENT:      Head: Normocephalic and atraumatic  Eyes:      General:         Right eye: No discharge  Left eye: No discharge  Extraocular Movements: Extraocular movements intact  Conjunctiva/sclera: Conjunctivae normal    Cardiovascular:      Rate and Rhythm: Normal rate  Rhythm irregular  Heart sounds: Normal heart sounds  No murmur heard  No friction rub  No gallop  Pulmonary:      Effort: Pulmonary effort is normal  No respiratory distress  Breath sounds: No stridor  No wheezing or rhonchi  Comments: Diminished breath sounds, trace crackles right lung base   Musculoskeletal:         General: No swelling, tenderness or signs of injury  Right lower leg: Edema present  Left lower leg: Edema present  Comments: 1+ pitting edema    Skin:     General: Skin is warm  Coloration: Skin is not pale  Findings: No erythema or rash     Neurological:      Mental Status: He is alert and oriented to person, place, and time  Motor: No weakness  Psychiatric:         Mood and Affect: Mood normal          Behavior: Behavior normal           Labs: I have personally reviewed all pertinent results       Admission on 03/15/2023, Discharged on 03/17/2023   Component Date Value Ref Range Status   • WBC 03/15/2023 15 85 (H)  4 31 - 10 16 Thousand/uL Final   • RBC 03/15/2023 4 93  3 88 - 5 62 Million/uL Final   • Hemoglobin 03/15/2023 15 8  12 0 - 17 0 g/dL Final   • Hematocrit 03/15/2023 50 7 (H)  36 5 - 49 3 % Final   • MCV 03/15/2023 103 (H)  82 - 98 fL Final   • MCH 03/15/2023 32 0  26 8 - 34 3 pg Final   • MCHC 03/15/2023 31 2 (L)  31 4 - 37 4 g/dL Final   • RDW 03/15/2023 13 3  11 6 - 15 1 % Final   • MPV 03/15/2023 10 0  8 9 - 12 7 fL Final   • Platelets 84/03/1153 275  149 - 390 Thousands/uL Final   • nRBC 03/15/2023 0  /100 WBCs Final   • Neutrophils Relative 03/15/2023 81 (H)  43 - 75 % Final   • Immat GRANS % 03/15/2023 0  0 - 2 % Final   • Lymphocytes Relative 03/15/2023 6 (L)  14 - 44 % Final   • Monocytes Relative 03/15/2023 12  4 - 12 % Final   • Eosinophils Relative 03/15/2023 1  0 - 6 % Final   • Basophils Relative 03/15/2023 0  0 - 1 % Final   • Neutrophils Absolute 03/15/2023 12 67 (H)  1 85 - 7 62 Thousands/µL Final   • Immature Grans Absolute 03/15/2023 0 06  0 00 - 0 20 Thousand/uL Final   • Lymphocytes Absolute 03/15/2023 0 95  0 60 - 4 47 Thousands/µL Final   • Monocytes Absolute 03/15/2023 1 97 (H)  0 17 - 1 22 Thousand/µL Final   • Eosinophils Absolute 03/15/2023 0 15  0 00 - 0 61 Thousand/µL Final   • Basophils Absolute 03/15/2023 0 05  0 00 - 0 10 Thousands/µL Final   • Sodium 03/15/2023 140  135 - 147 mmol/L Final   • Potassium 03/15/2023 4 0  3 5 - 5 3 mmol/L Final   • Chloride 03/15/2023 95 (L)  96 - 108 mmol/L Final   • CO2 03/15/2023 31  21 - 32 mmol/L Final   • ANION GAP 03/15/2023 14 (H)  4 - 13 mmol/L Final   • BUN 03/15/2023 13  5 - 25 mg/dL Final   • Creatinine 03/15/2023 1 13  0 60 - 1 30 mg/dL Final    Standardized to IDMS reference method   • Glucose 03/15/2023 124  65 - 140 mg/dL Final    If the patient is fasting, the ADA then defines impaired fasting glucose as > 100 mg/dL and diabetes as > or equal to 123 mg/dL  Specimen collection should occur prior to Sulfasalazine administration due to the potential for falsely depressed results  Specimen collection should occur prior to Sulfapyridine administration due to the potential for falsely elevated results  • Calcium 03/15/2023 9 7  8 4 - 10 2 mg/dL Final   • AST 03/15/2023 30  13 - 39 U/L Final    Specimen collection should occur prior to Sulfasalazine administration due to the potential for falsely depressed results  • ALT 03/15/2023 31  7 - 52 U/L Final    Specimen collection should occur prior to Sulfasalazine administration due to the potential for falsely depressed results  • Alkaline Phosphatase 03/15/2023 75  34 - 104 U/L Final   • Total Protein 03/15/2023 7 5  6 4 - 8 4 g/dL Final   • Albumin 03/15/2023 3 8  3 5 - 5 0 g/dL Final   • Total Bilirubin 03/15/2023 0 65  0 20 - 1 00 mg/dL Final   • eGFR 03/15/2023 61  ml/min/1 73sq m Final   • LACTIC ACID 03/15/2023 3 6 (HH)  0 5 - 2 0 mmol/L Final   • Procalcitonin 03/15/2023 0 17  <=0 25 ng/ml Final    Comment: Suspected Lower Respiratory Tract Infection (LRTI):  - LESS than or EQUAL to 0 25 ng/mL:   low likelihood for bacterial LRTI; antibiotics DISCOURAGED   - GREATER than 0 25 ng/mL:   increased likelihood for bacterial LRTI; antibiotics ENCOURAGED  Suspected Sepsis:  - Strongly consider initiating antibiotics in ALL UNSTABLE patients    - LESS than or EQUAL to 0 5 ng/mL:   low likelihood for bacterial sepsis; antibiotics DISCOURAGED   - GREATER than 0 5 ng/mL:   increased likelihood for bacterial sepsis; antibiotics ENCOURAGED   - GREATER than 2 ng/mL:   high risk for severe sepsis / septic shock; antibiotics strongly ENCOURAGED  Decisions on antibiotic use should not be based solely on Procalcitonin (PCT) levels  If PCT is low but uncertainty exists with stopping antibiotics, repeat PCT in 6-24 hours to confirm the low level  If antibiotics are administered (regardless if initial PCT was high or low), repeat PCT every 1-2 days to consider early antibiotic cessation (when GREATER                            than 80% decrease from the peak OR when PCT drops below designated cutoffs, whichever comes first), so long as the infection is NOT one that typically requires prolonged treatment durations (e g , bone/joint infections, endocarditis, Staph  aureus bacteremia)      Situations of FALSE-POSITIVE Procalcitonin values:  1) Newborns < 67 hours old  2) Massive stress from severe trauma / burns, major surgery, acute pancreatitis, cardiogenic / hemorrhagic shock, sickle cell crisis, or other organ perfusion abnormalities  3) Malaria and some Candidal infections  4) Treatment with agents that stimulate cytokines (e g , OKT3, anti-lymphocyte globulins, alemtuzumab, IL-2, granulocyte transfusion [NOT GCSFs])  5) Chronic renal disease causes elevated baseline levels (consider GREATER than 0 75 ng/mL as an abnormal cut-off); initiating HD/CRRT may cause transient decreases  6) Paraneoplastic syndromes from medullary thyroid or SCLC, some forms of vasculitis, and acute [id number]                            disease    Situations of FALSE-NEGATIVE Procalcitonin values:  1) Too early in clinical course for PCT to have reached its peak (may repeat in 6-24 hours to confirm low level)  2) Localized infection WITHOUT systemic (SIRS / sepsis) response (e g , an abscess, osteomyelitis, cystitis)  3) Mycobacteria (e g , Tuberculosis, MAC)  4) Cystic fibrosis exacerbations     • Protime 03/15/2023 13 5  11 6 - 14 5 seconds Final   • INR 03/15/2023 1 03  0 84 - 1 19 Final   • PTT 03/15/2023 28  23 - 37 seconds Final    Therapeutic Heparin Range = 60-90 seconds   • Blood Culture 03/15/2023 No Growth After 5 Days  Final   • Blood Culture 03/15/2023 No Growth After 5 Days  Final   • Color, UA 03/15/2023 Yellow  Yellow, Straw Final   • Clarity, UA 03/15/2023 Clear  Hazy, Clear Final   • Specific Gravity, UA 03/15/2023 1 010  >1 005 - <1 030 Final   • pH, UA 03/15/2023 6 0  5 0, 5 5, 6 0, 6 5, 7 0, 7 5 Final   • Leukocytes, UA 03/15/2023 Negative  Negative Final   • Nitrite, UA 03/15/2023 Negative  Negative Final   • Protein, UA 03/15/2023 Negative  Negative, Interference- unable to analyze mg/dl Final   • Glucose, UA 03/15/2023 Negative  Negative mg/dl Final   • Ketones, UA 03/15/2023 Negative  Negative mg/dl Final   • Urobilinogen, UA 03/15/2023 0 2  0 2, 1 0 E U /dl E U /dl Final   • Bilirubin, UA 03/15/2023 Negative  Negative Final   • Occult Blood, UA 03/15/2023 Trace-Intact (A)  Negative Final   • SARS-CoV-2 03/15/2023 Negative  Negative Final   • INFLUENZA A PCR 03/15/2023 Negative  Negative Final   • INFLUENZA B PCR 03/15/2023 Negative  Negative Final   • RSV PCR 03/15/2023 Negative  Negative Final   • BNP 03/15/2023 114 (H)  0 - 100 pg/mL Final   • hs TnI 0hr 03/15/2023 27  "Refer to ACS Flowchart"- see link ng/L Final    Comment:                                              Initial (time 0) result  If >=50 ng/L, Myocardial injury suggested ;  Type of myocardial injury and treatment strategy  to be determined  If 5-49 ng/L, a delta result at 2 hours and or 4 hours will be needed to further evaluate  If <4 ng/L, and chest pain has been >3 hours since onset, patient may qualify for discharge based on the HEART score in the ED  If <5 ng/L and <3hours since onset of chest pain, a delta result at 2 hours will be needed to further evaluate  HS Troponin 99th Percentile URL of a Health Population=12 ng/L with a 95% Confidence Interval of 8-18 ng/L      Second Troponin (time 2 hours)  If calculated delta >= 20 ng/L,  Myocardial injury suggested ; Type of myocardial injury and treatment strategy to be determined  If 5-49 ng/L and the calculated delta is 5-19 ng/L, consult medical service for evaluation  Continue evaluation for ischemia on ecg and other possible etiology and repeat hs troponin at 4 hours  If delta                            is <5 ng/L at 2 hours, consider discharge based on risk stratification via the HEART score (if in ED), or LARON risk score in IP/Observation  HS Troponin 99th Percentile URL of a Health Population=12 ng/L with a 95% Confidence Interval of 8-18 ng/L  • Ventricular Rate 03/15/2023 143  BPM Final   • Atrial Rate 03/15/2023 143  BPM Final   • UT Interval 03/15/2023 140  ms Final   • QRSD Interval 03/15/2023 84  ms Final   • QT Interval 03/15/2023 270  ms Final   • QTC Interval 03/15/2023 416  ms Final   • P Axis 03/15/2023 75  degrees Final   • QRS Axis 03/15/2023 76  degrees Final   • T Wave Axis 03/15/2023 64  degrees Final   • hs TnI 2hr 03/15/2023 40  "Refer to ACS Flowchart"- see link ng/L Final    Comment:                                              Initial (time 0) result  If >=50 ng/L, Myocardial injury suggested ;  Type of myocardial injury and treatment strategy  to be determined  If 5-49 ng/L, a delta result at 2 hours and or 4 hours will be needed to further evaluate  If <4 ng/L, and chest pain has been >3 hours since onset, patient may qualify for discharge based on the HEART score in the ED  If <5 ng/L and <3hours since onset of chest pain, a delta result at 2 hours will be needed to further evaluate  HS Troponin 99th Percentile URL of a Health Population=12 ng/L with a 95% Confidence Interval of 8-18 ng/L  Second Troponin (time 2 hours)  If calculated delta >= 20 ng/L,  Myocardial injury suggested ; Type of myocardial injury and treatment strategy to be determined  If 5-49 ng/L and the calculated delta is 5-19 ng/L, consult medical service for evaluation    Continue evaluation for ischemia on ecg and other possible etiology and repeat hs troponin at 4 hours  If delta                            is <5 ng/L at 2 hours, consider discharge based on risk stratification via the HEART score (if in ED), or LARON risk score in IP/Observation  HS Troponin 99th Percentile URL of a Health Population=12 ng/L with a 95% Confidence Interval of 8-18 ng/L  • Delta 2hr hsTnI 03/15/2023 13  <20 ng/L Final   • Sputum Culture 03/15/2023 3+ Growth of   Final    Mixed Respiratory robi  Commensal respiratory robi only; No significant growth of Staph aureus/MRSA or Pseudomonas aeruginosa  • Gram Stain Result 03/15/2023 Rare Epithelial cells per low power field (A)   Final   • Gram Stain Result 03/15/2023 No polys seen (A)   Final   • Gram Stain Result 03/15/2023 2+ Gram positive cocci in pairs (A)   Final   • Gram Stain Result 03/15/2023 2+ Gram positive rods (A)   Final   • Gram Stain Result 03/15/2023 2+ Gram negative rods (A)   Final   • Strep pneumoniae antigen, urine 03/15/2023 Negative  Negative Final   • Legionella Urinary Antigen 03/15/2023 Negative  Negative Final   • LACTIC ACID 03/15/2023 1 3  0 5 - 2 0 mmol/L Final   • MRSA Culture Only 03/15/2023 No Methicillin Resistant Staphlyococcus aureus (MRSA) isolated   Final   • hs TnI 4hr 03/15/2023 41  "Refer to ACS Flowchart"- see link ng/L Final    Comment:                                              Initial (time 0) result  If >=50 ng/L, Myocardial injury suggested ;  Type of myocardial injury and treatment strategy  to be determined  If 5-49 ng/L, a delta result at 2 hours and or 4 hours will be needed to further evaluate  If <4 ng/L, and chest pain has been >3 hours since onset, patient may qualify for discharge based on the HEART score in the ED  If <5 ng/L and <3hours since onset of chest pain, a delta result at 2 hours will be needed to further evaluate      HS Troponin 99th Percentile URL of a Health Population=12 ng/L with a 95% Confidence Interval of 8-18 ng/L     Second Troponin (time 2 hours)  If calculated delta >= 20 ng/L,  Myocardial injury suggested ; Type of myocardial injury and treatment strategy to be determined  If 5-49 ng/L and the calculated delta is 5-19 ng/L, consult medical service for evaluation  Continue evaluation for ischemia on ecg and other possible etiology and repeat hs troponin at 4 hours  If delta                            is <5 ng/L at 2 hours, consider discharge based on risk stratification via the HEART score (if in ED), or LARON risk score in IP/Observation  HS Troponin 99th Percentile URL of a Health Population=12 ng/L with a 95% Confidence Interval of 8-18 ng/L  • Delta 4hr hsTnI 03/15/2023 14  <20 ng/L Final   • RBC, UA 03/15/2023 0-1  None Seen, 0-1, 1-2, 2-4, 0-5 /hpf Final   • WBC, UA 03/15/2023 0-1  None Seen, 0-1, 1-2, 0-5, 2-4 /hpf Final   • Epithelial Cells 03/15/2023 Occasional  None Seen, Occasional /hpf Final   • Bacteria, UA 03/15/2023 Occasional  None Seen, Occasional /hpf Final   • Hyaline Casts, UA 03/15/2023 0-1 (A)  (none) /lpf Final   • Fine granular casts 03/15/2023 0-1  /lpf Final   • Procalcitonin 03/16/2023 0 51 (H)  <=0 25 ng/ml Final    Comment: Suspected Lower Respiratory Tract Infection (LRTI):  - LESS than or EQUAL to 0 25 ng/mL:   low likelihood for bacterial LRTI; antibiotics DISCOURAGED   - GREATER than 0 25 ng/mL:   increased likelihood for bacterial LRTI; antibiotics ENCOURAGED  Suspected Sepsis:  - Strongly consider initiating antibiotics in ALL UNSTABLE patients  - LESS than or EQUAL to 0 5 ng/mL:   low likelihood for bacterial sepsis; antibiotics DISCOURAGED   - GREATER than 0 5 ng/mL:   increased likelihood for bacterial sepsis; antibiotics ENCOURAGED   - GREATER than 2 ng/mL:   high risk for severe sepsis / septic shock; antibiotics strongly ENCOURAGED  Decisions on antibiotic use should not be based solely on Procalcitonin (PCT) levels   If PCT is low but uncertainty exists with stopping antibiotics, repeat PCT in 6-24 hours to confirm the low level  If antibiotics are administered (regardless if initial PCT was high or low), repeat PCT every 1-2 days to consider early antibiotic cessation (when GREATER                            than 80% decrease from the peak OR when PCT drops below designated cutoffs, whichever comes first), so long as the infection is NOT one that typically requires prolonged treatment durations (e g , bone/joint infections, endocarditis, Staph  aureus bacteremia)      Situations of FALSE-POSITIVE Procalcitonin values:  1) Newborns < 67 hours old  2) Massive stress from severe trauma / burns, major surgery, acute pancreatitis, cardiogenic / hemorrhagic shock, sickle cell crisis, or other organ perfusion abnormalities  3) Malaria and some Candidal infections  4) Treatment with agents that stimulate cytokines (e g , OKT3, anti-lymphocyte globulins, alemtuzumab, IL-2, granulocyte transfusion [NOT GCSFs])  5) Chronic renal disease causes elevated baseline levels (consider GREATER than 0 75 ng/mL as an abnormal cut-off); initiating HD/CRRT may cause transient decreases  6) Paraneoplastic syndromes from medullary thyroid or SCLC, some forms of vasculitis, and acute [id number]                            disease    Situations of FALSE-NEGATIVE Procalcitonin values:  1) Too early in clinical course for PCT to have reached its peak (may repeat in 6-24 hours to confirm low level)  2) Localized infection WITHOUT systemic (SIRS / sepsis) response (e g , an abscess, osteomyelitis, cystitis)  3) Mycobacteria (e g , Tuberculosis, MAC)  4) Cystic fibrosis exacerbations     • WBC 03/16/2023 11 58 (H)  4 31 - 10 16 Thousand/uL Final   • RBC 03/16/2023 4 01  3 88 - 5 62 Million/uL Final   • Hemoglobin 03/16/2023 13 1  12 0 - 17 0 g/dL Final   • Hematocrit 03/16/2023 41 5  36 5 - 49 3 % Final   • MCV 03/16/2023 104 (H)  82 - 98 fL Final   • MCH 03/16/2023 32 7  26 8 - 34 3 pg Final   • MCHC 03/16/2023 31 6  31 4 - 37 4 g/dL Final   • RDW 03/16/2023 13 4  11 6 - 15 1 % Final   • Platelets 76/02/5002 202  149 - 390 Thousands/uL Final   • MPV 03/16/2023 10 0  8 9 - 12 7 fL Final   • Sodium 03/16/2023 139  135 - 147 mmol/L Final   • Potassium 03/16/2023 3 3 (L)  3 5 - 5 3 mmol/L Final   • Chloride 03/16/2023 101  96 - 108 mmol/L Final   • CO2 03/16/2023 31  21 - 32 mmol/L Final   • ANION GAP 03/16/2023 7  4 - 13 mmol/L Final   • BUN 03/16/2023 12  5 - 25 mg/dL Final   • Creatinine 03/16/2023 0 99  0 60 - 1 30 mg/dL Final    Standardized to IDMS reference method   • Glucose 03/16/2023 89  65 - 140 mg/dL Final    If the patient is fasting, the ADA then defines impaired fasting glucose as > 100 mg/dL and diabetes as > or equal to 123 mg/dL  Specimen collection should occur prior to Sulfasalazine administration due to the potential for falsely depressed results  Specimen collection should occur prior to Sulfapyridine administration due to the potential for falsely elevated results  • Calcium 03/16/2023 8 3 (L)  8 4 - 10 2 mg/dL Final   • Corrected Calcium 03/16/2023 9 0  8 3 - 10 1 mg/dL Final   • AST 03/16/2023 28  13 - 39 U/L Final    Specimen collection should occur prior to Sulfasalazine administration due to the potential for falsely depressed results  • ALT 03/16/2023 30  7 - 52 U/L Final    Specimen collection should occur prior to Sulfasalazine administration due to the potential for falsely depressed results      • Alkaline Phosphatase 03/16/2023 58  34 - 104 U/L Final   • Total Protein 03/16/2023 5 9 (L)  6 4 - 8 4 g/dL Final   • Albumin 03/16/2023 3 1 (L)  3 5 - 5 0 g/dL Final   • Total Bilirubin 03/16/2023 0 60  0 20 - 1 00 mg/dL Final   • eGFR 03/16/2023 72  ml/min/1 73sq m Final   • Magnesium 03/16/2023 2 0  1 9 - 2 7 mg/dL Final   • Phosphorus 03/16/2023 2 6  2 3 - 4 1 mg/dL Final   • hs TnI 0hr 03/16/2023 36  "Refer to ACS Flowchart"- see link ng/L Final    Comment: Initial (time 0) result  If >=50 ng/L, Myocardial injury suggested ;  Type of myocardial injury and treatment strategy  to be determined  If 5-49 ng/L, a delta result at 2 hours and or 4 hours will be needed to further evaluate  If <4 ng/L, and chest pain has been >3 hours since onset, patient may qualify for discharge based on the HEART score in the ED  If <5 ng/L and <3hours since onset of chest pain, a delta result at 2 hours will be needed to further evaluate  HS Troponin 99th Percentile URL of a Health Population=12 ng/L with a 95% Confidence Interval of 8-18 ng/L  Second Troponin (time 2 hours)  If calculated delta >= 20 ng/L,  Myocardial injury suggested ; Type of myocardial injury and treatment strategy to be determined  If 5-49 ng/L and the calculated delta is 5-19 ng/L, consult medical service for evaluation  Continue evaluation for ischemia on ecg and other possible etiology and repeat hs troponin at 4 hours  If delta                            is <5 ng/L at 2 hours, consider discharge based on risk stratification via the HEART score (if in ED), or LARON risk score in IP/Observation  HS Troponin 99th Percentile URL of a Health Population=12 ng/L with a 95% Confidence Interval of 8-18 ng/L  • hs TnI 2hr 03/16/2023 31  "Refer to ACS Flowchart"- see link ng/L Final    Comment:                                              Initial (time 0) result  If >=50 ng/L, Myocardial injury suggested ;  Type of myocardial injury and treatment strategy  to be determined  If 5-49 ng/L, a delta result at 2 hours and or 4 hours will be needed to further evaluate  If <4 ng/L, and chest pain has been >3 hours since onset, patient may qualify for discharge based on the HEART score in the ED  If <5 ng/L and <3hours since onset of chest pain, a delta result at 2 hours will be needed to further evaluate      HS Troponin 99th Percentile URL of a Health Population=12 ng/L with a 95% Confidence Interval of 8-18 ng/L  Second Troponin (time 2 hours)  If calculated delta >= 20 ng/L,  Myocardial injury suggested ; Type of myocardial injury and treatment strategy to be determined  If 5-49 ng/L and the calculated delta is 5-19 ng/L, consult medical service for evaluation  Continue evaluation for ischemia on ecg and other possible etiology and repeat hs troponin at 4 hours  If delta                            is <5 ng/L at 2 hours, consider discharge based on risk stratification via the HEART score (if in ED), or LARON risk score in IP/Observation  HS Troponin 99th Percentile URL of a Health Population=12 ng/L with a 95% Confidence Interval of 8-18 ng/L  • Delta 2hr hsTnI 03/16/2023 -5  <20 ng/L Final   • hs TnI 4hr 03/16/2023 30  "Refer to ACS Flowchart"- see link ng/L Final    Comment:                                              Initial (time 0) result  If >=50 ng/L, Myocardial injury suggested ;  Type of myocardial injury and treatment strategy  to be determined  If 5-49 ng/L, a delta result at 2 hours and or 4 hours will be needed to further evaluate  If <4 ng/L, and chest pain has been >3 hours since onset, patient may qualify for discharge based on the HEART score in the ED  If <5 ng/L and <3hours since onset of chest pain, a delta result at 2 hours will be needed to further evaluate  HS Troponin 99th Percentile URL of a Health Population=12 ng/L with a 95% Confidence Interval of 8-18 ng/L  Second Troponin (time 2 hours)  If calculated delta >= 20 ng/L,  Myocardial injury suggested ; Type of myocardial injury and treatment strategy to be determined  If 5-49 ng/L and the calculated delta is 5-19 ng/L, consult medical service for evaluation  Continue evaluation for ischemia on ecg and other possible etiology and repeat hs troponin at 4 hours    If delta                            is <5 ng/L at 2 hours, consider discharge based on risk stratification via the HEART score (if in ED), or LARON risk score in IP/Observation  HS Troponin 99th Percentile URL of a Health Population=12 ng/L with a 95% Confidence Interval of 8-18 ng/L  • Delta 4hr hsTnI 03/16/2023 -6  <20 ng/L Final   • WBC 03/17/2023 9 73  4 31 - 10 16 Thousand/uL Final   • RBC 03/17/2023 3 89  3 88 - 5 62 Million/uL Final   • Hemoglobin 03/17/2023 12 3  12 0 - 17 0 g/dL Final   • Hematocrit 03/17/2023 39 3  36 5 - 49 3 % Final   • MCV 03/17/2023 101 (H)  82 - 98 fL Final   • MCH 03/17/2023 31 6  26 8 - 34 3 pg Final   • MCHC 03/17/2023 31 3 (L)  31 4 - 37 4 g/dL Final   • RDW 03/17/2023 13 0  11 6 - 15 1 % Final   • MPV 03/17/2023 9 7  8 9 - 12 7 fL Final   • Platelets 41/67/2582 217  149 - 390 Thousands/uL Final   • Sodium 03/17/2023 138  135 - 147 mmol/L Final   • Potassium 03/17/2023 4 3  3 5 - 5 3 mmol/L Final   • Chloride 03/17/2023 104  96 - 108 mmol/L Final   • CO2 03/17/2023 26  21 - 32 mmol/L Final   • ANION GAP 03/17/2023 8  4 - 13 mmol/L Final   • BUN 03/17/2023 13  5 - 25 mg/dL Final   • Creatinine 03/17/2023 0 82  0 60 - 1 30 mg/dL Final    Standardized to IDMS reference method   • Glucose 03/17/2023 153 (H)  65 - 140 mg/dL Final    If the patient is fasting, the ADA then defines impaired fasting glucose as > 100 mg/dL and diabetes as > or equal to 123 mg/dL  Specimen collection should occur prior to Sulfasalazine administration due to the potential for falsely depressed results  Specimen collection should occur prior to Sulfapyridine administration due to the potential for falsely elevated results     • Calcium 03/17/2023 8 3 (L)  8 4 - 10 2 mg/dL Final   • eGFR 03/17/2023 84  ml/min/1 73sq m Final   • Magnesium 03/17/2023 2 0  1 9 - 2 7 mg/dL Final   • Procalcitonin 03/17/2023 0 37 (H)  <=0 25 ng/ml Final    Comment: Suspected Lower Respiratory Tract Infection (LRTI):  - LESS than or EQUAL to 0 25 ng/mL:   low likelihood for bacterial LRTI; antibiotics DISCOURAGED   - GREATER than 0 25 ng/mL:   increased likelihood for bacterial LRTI; antibiotics ENCOURAGED  Suspected Sepsis:  - Strongly consider initiating antibiotics in ALL UNSTABLE patients  - LESS than or EQUAL to 0 5 ng/mL:   low likelihood for bacterial sepsis; antibiotics DISCOURAGED   - GREATER than 0 5 ng/mL:   increased likelihood for bacterial sepsis; antibiotics ENCOURAGED   - GREATER than 2 ng/mL:   high risk for severe sepsis / septic shock; antibiotics strongly ENCOURAGED  Decisions on antibiotic use should not be based solely on Procalcitonin (PCT) levels  If PCT is low but uncertainty exists with stopping antibiotics, repeat PCT in 6-24 hours to confirm the low level  If antibiotics are administered (regardless if initial PCT was high or low), repeat PCT every 1-2 days to consider early antibiotic cessation (when GREATER                            than 80% decrease from the peak OR when PCT drops below designated cutoffs, whichever comes first), so long as the infection is NOT one that typically requires prolonged treatment durations (e g , bone/joint infections, endocarditis, Staph  aureus bacteremia)      Situations of FALSE-POSITIVE Procalcitonin values:  1) Newborns < 67 hours old  2) Massive stress from severe trauma / burns, major surgery, acute pancreatitis, cardiogenic / hemorrhagic shock, sickle cell crisis, or other organ perfusion abnormalities  3) Malaria and some Candidal infections  4) Treatment with agents that stimulate cytokines (e g , OKT3, anti-lymphocyte globulins, alemtuzumab, IL-2, granulocyte transfusion [NOT GCSFs])  5) Chronic renal disease causes elevated baseline levels (consider GREATER than 0 75 ng/mL as an abnormal cut-off); initiating HD/CRRT may cause transient decreases  6) Paraneoplastic syndromes from medullary thyroid or SCLC, some forms of vasculitis, and acute [id number]                            disease    Situations of FALSE-NEGATIVE Procalcitonin values:  1) Too early in clinical course for PCT to have reached its peak (may repeat in 6-24 hours to confirm low level)  2) Localized infection WITHOUT systemic (SIRS / sepsis) response (e g , an abscess, osteomyelitis, cystitis)  3) Mycobacteria (e g , Tuberculosis, MAC)  4) Cystic fibrosis exacerbations     • Ventricular Rate 03/16/2023 112  BPM Final   • Atrial Rate 03/16/2023 112  BPM Final   • NV Interval 03/16/2023 158  ms Final   • QRSD Interval 03/16/2023 92  ms Final   • QT Interval 03/16/2023 344  ms Final   • QTC Interval 03/16/2023 469  ms Final   • P Axis 03/16/2023 66  degrees Final   • QRS Axis 03/16/2023 64  degrees Final   • T Wave Axis 03/16/2023 59  degrees Final   • Segmented % 03/17/2023 86 (H)  43 - 75 % Final   • Bands % 03/17/2023 4  0 - 8 % Final   • Lymphocytes % 03/17/2023 4 (L)  14 - 44 % Final   • Monocytes % 03/17/2023 6  4 - 12 % Final   • Eosinophils, % 03/17/2023 0  0 - 6 % Final   • Basophils % 03/17/2023 0  0 - 1 % Final   • Absolute Neutrophils 03/17/2023 8 76 (H)  1 85 - 7 62 Thousand/uL Final   • Lymphocytes Absolute 03/17/2023 0 39 (L)  0 60 - 4 47 Thousand/uL Final   • Monocytes Absolute 03/17/2023 0 58  0 00 - 1 22 Thousand/uL Final   • Eosinophils Absolute 03/17/2023 0 00  0 00 - 0 40 Thousand/uL Final   • Basophils Absolute 03/17/2023 0 00  0 00 - 0 10 Thousand/uL Final   • RBC Morphology 03/17/2023 Normal   Final   • Platelet Estimate 07/04/3875 Adequate  Adequate Final       Imaging:  I have personally reviewed all pertinent results  Assessment/Plan    No problem-specific Assessment & Plan notes found for this encounter  Silver Raphael was seen today for transition of care management  Diagnoses and all orders for this visit:    Chronic obstructive pulmonary disease, unspecified COPD type ([facility] [address] )    Acute on chronic respiratory failure with hypoxia (HCC)    LAMBERTO on CPAP    Orthopnea    Irregular heart rhythm  -     POCT ECG    Lower extremity edema    Hypokalemia  -     Basic metabolic panel;  Future    Prostate cancer [facility])          Future Appointments   Date Time Provider Noel Ary   4/5/2023  2:00 PM DO MECHE Forman [facility]   4/7/2023  3:00 PM Jordana Garza MD CARD ONC [facility]   4/11/2023  3:00 PM Kaley De Luna DO BM Cardio [facility]   4/17/2023  1:30 PM Sole Maria MD NEURO [facility]   4/26/2023 11:20 AM Liz Lopez DO PULM [facility]   4/26/2023  3:00 PM Ilene Maria MD [facility] CARE [facility]   5/15/2023  1:00 [facility]   2/7/2024  1:00 PM CA VASCULAR 2 CA Car YEN Salas DO  [facility] Primary [facility]

## 2023-03-22 ENCOUNTER — TELEPHONE (OUTPATIENT)
Dept: FAMILY MEDICINE CLINIC | Facility: CLINIC | Age: 78
End: 2023-03-22

## 2023-03-22 ENCOUNTER — OFFICE VISIT (OUTPATIENT)
Dept: PULMONOLOGY | Facility: CLINIC | Age: 78
End: 2023-03-22

## 2023-03-22 VITALS
HEIGHT: 68 IN | HEART RATE: 80 BPM | SYSTOLIC BLOOD PRESSURE: 106 MMHG | DIASTOLIC BLOOD PRESSURE: 52 MMHG | WEIGHT: 206 LBS | TEMPERATURE: 97 F | BODY MASS INDEX: 31.22 KG/M2 | OXYGEN SATURATION: 91 %

## 2023-03-22 DIAGNOSIS — J18.9 PNEUMONIA OF BOTH LOWER LOBES DUE TO INFECTIOUS ORGANISM: ICD-10-CM

## 2023-03-22 DIAGNOSIS — I49.1 PAC (PREMATURE ATRIAL CONTRACTION): ICD-10-CM

## 2023-03-22 DIAGNOSIS — J93.9 PNEUMOTHORAX ON RIGHT: ICD-10-CM

## 2023-03-22 DIAGNOSIS — G47.33 OSA ON CPAP: ICD-10-CM

## 2023-03-22 DIAGNOSIS — I50.32 CHRONIC DIASTOLIC (CONGESTIVE) HEART FAILURE (HCC): Chronic | ICD-10-CM

## 2023-03-22 DIAGNOSIS — I49.3 PVC (PREMATURE VENTRICULAR CONTRACTION): Primary | ICD-10-CM

## 2023-03-22 DIAGNOSIS — J44.9 CHRONIC OBSTRUCTIVE PULMONARY DISEASE, UNSPECIFIED COPD TYPE (HCC): Primary | ICD-10-CM

## 2023-03-22 DIAGNOSIS — J96.11 CHRONIC RESPIRATORY FAILURE WITH HYPOXIA (HCC): ICD-10-CM

## 2023-03-22 DIAGNOSIS — Z99.89 OSA ON CPAP: ICD-10-CM

## 2023-03-22 LAB
DME PARACHUTE DELIVERY DATE REQUESTED: NORMAL
DME PARACHUTE ITEM DESCRIPTION: NORMAL
DME PARACHUTE ORDER STATUS: NORMAL
DME PARACHUTE SUPPLIER NAME: NORMAL
DME PARACHUTE SUPPLIER PHONE: NORMAL

## 2023-03-22 NOTE — TELEPHONE ENCOUNTER
Beryl Jo was recently discharged from the hospital, and it was suggested that he follow-up with pulmonology in 7-14 days from discharge  He has an appointment 4/26, but can we call pulmonology to see if they can get him in sooner? Thanks!

## 2023-03-22 NOTE — TELEPHONE ENCOUNTER
Please also let him know I discussed with his cardiologist, will order a Holter monitor, and may need a couple days of double Lasix  Please have him complete blood work this AM, and can take an extra dose of Lasix and potassium today

## 2023-03-22 NOTE — ASSESSMENT & PLAN NOTE
I am unable to quantify Shlomo's lung function because he refuses pulmonary function test   He had an episode of syncope last time he tried to do them and I do not have access to his old records  I would presume he has COPD based on severity of disease and reviewing his CAT scan  He will maintain on triple therapy with Wixela and Spiriva daily and instructed him to take his nebulizer 3 times a day  We restratified him for upcoming prostate surgery and he would be a low to intermediate risk of postoperative complications  He appears stable to move forward with his urologic procedure at this time  I gave Christine Salguero formal education about COPD how to prevent exacerbations and energy conservation and keeping healthy  I gave him her education booklet  I like to check his Igg levels to assure his immune system is adequate  He will maintain on Zithromax 3 times a week

## 2023-03-22 NOTE — ASSESSMENT & PLAN NOTE
Altagracia Nunez has normal breath sounds on today's visit we will follow-up his CAT scan in May to assure resolution of his pneumothorax and pneumonia

## 2023-03-22 NOTE — PROGRESS NOTES
Assessment/Plan:    Pneumothorax on right  Altagracia Nunze has normal breath sounds on today's visit we will follow-up his CAT scan in May to assure resolution of his pneumothorax and pneumonia  COPD (chronic obstructive pulmonary disease) (HCC)  I am unable to quantify Shlomo's lung function because he refuses pulmonary function test   He had an episode of syncope last time he tried to do them and I do not have access to his old records  I would presume he has COPD based on severity of disease and reviewing his CAT scan  He will maintain on triple therapy with Wixela and Spiriva daily and instructed him to take his nebulizer 3 times a day  We restratified him for upcoming prostate surgery and he would be a low to intermediate risk of postoperative complications  He appears stable to move forward with his urologic procedure at this time  I gave Altagracia Nunez formal education about COPD how to prevent exacerbations and energy conservation and keeping healthy  I gave him her education booklet  I like to check his Igg levels to assure his immune system is adequate  He will maintain on Zithromax 3 times a week  Diagnoses and all orders for this visit:    Chronic obstructive pulmonary disease, unspecified COPD type ([address] )    Chronic respiratory failure with hypoxia (HCC)    LAMBERTO on CPAP    Pneumonia of both lower lobes due to infectious organism  -     CT chest wo contrast; Future  -     IgG, IgA, IgM; Future    Pneumothorax on right    Chronic diastolic (congestive) heart failure (HCC)          Subjective:      Patient ID: Heather Villagomez is a 65 y o  male  Altagracia Nunez is here for follow-up of his COPD  He feels his breathing is stable today however he had a terrible couple of months with recurrent pneumonia and pneumothorax  He is maintaining adequate oxygenation on 2 L using his Wixela twice a day and his Spiriva once daily  He is using his nebulizer as needed    He does not have much in the way of cough and mucus production  The following portions of the patient's history were reviewed and updated as appropriate: allergies, current medications, past family history, past medical history, past social history, past surgical history and problem list     Review of Systems   Constitutional: Negative  HENT: Negative  Eyes: Negative  Respiratory: Positive for cough and shortness of breath  Cardiovascular: Negative  Gastrointestinal: Negative  Endocrine: Negative  Genitourinary: Negative  Allergic/Immunologic: Negative  Neurological: Negative  Hematological: Negative  Psychiatric/Behavioral: Negative  Objective:      /52   Pulse 80   Temp (!) 97 °F (36 1 °C) (Tympanic)   Ht 5' 8" (1 727 m)   Wt 93 4 kg (206 lb)   SpO2 91%   BMI 31 32 kg/m²          Physical Exam  Constitutional:       Appearance: He is well-developed  HENT:      Head: Normocephalic  Eyes:      Pupils: Pupils are equal, round, and reactive to light  Cardiovascular:      Rate and Rhythm: Normal rate  Pulmonary:      Effort: Pulmonary effort is normal  No respiratory distress  Breath sounds: No wheezing or rales  Abdominal:      Palpations: Abdomen is soft  Musculoskeletal:         General: Normal range of motion  Cervical back: Neck supple  Skin:     General: Skin is warm and dry  Neurological:      Mental Status: He is alert and oriented to person, place, and time

## 2023-03-22 NOTE — CASE COMMUNICATION
Good Morning,   I sent the request for the shower chair to [employer]  They should be notifying the patient later today  Thank you!

## 2023-03-23 ENCOUNTER — HOME CARE VISIT (OUTPATIENT)
Dept: HOME HEALTH SERVICES | Facility: HOME HEALTHCARE | Age: 78
End: 2023-03-23

## 2023-03-23 VITALS
RESPIRATION RATE: 20 BRPM | HEART RATE: 68 BPM | SYSTOLIC BLOOD PRESSURE: 118 MMHG | TEMPERATURE: 97.2 F | OXYGEN SATURATION: 93 % | DIASTOLIC BLOOD PRESSURE: 76 MMHG | WEIGHT: 201 LBS | BODY MASS INDEX: 30.56 KG/M2

## 2023-03-28 ENCOUNTER — HOME CARE VISIT (OUTPATIENT)
Dept: HOME HEALTH SERVICES | Facility: HOME HEALTHCARE | Age: 78
End: 2023-03-28

## 2023-03-28 ENCOUNTER — HOSPITAL ENCOUNTER (INPATIENT)
Facility: HOSPITAL | Age: 78
LOS: 3 days | Discharge: HOME | DRG: 189 | End: 2023-03-31
Attending: EMERGENCY MEDICINE | Admitting: INTERNAL MEDICINE
Payer: COMMERCIAL

## 2023-03-28 ENCOUNTER — APPOINTMENT (EMERGENCY)
Dept: RADIOLOGY | Facility: HOSPITAL | Age: 78
DRG: 189 | End: 2023-03-28
Payer: COMMERCIAL

## 2023-03-28 DIAGNOSIS — J96.21 ACUTE ON CHRONIC RESPIRATORY FAILURE WITH HYPOXIA (HCC): ICD-10-CM

## 2023-03-28 DIAGNOSIS — J44.1 COPD EXACERBATION (HCC): ICD-10-CM

## 2023-03-28 DIAGNOSIS — J18.9 PNEUMONIA: ICD-10-CM

## 2023-03-28 DIAGNOSIS — R07.9 CHEST PAIN, UNSPECIFIED TYPE: ICD-10-CM

## 2023-03-28 DIAGNOSIS — R09.02 HYPOXIA: Primary | ICD-10-CM

## 2023-03-28 LAB
2HR DELTA HS TROPONIN: -2 NG/L
4HR DELTA HS TROPONIN: -4 NG/L
ALBUMIN SERPL BCG-MCNC: 3.5 G/DL (ref 3.5–5)
ALP SERPL-CCNC: 60 U/L (ref 34–104)
ALT SERPL W P-5'-P-CCNC: 20 U/L (ref 7–52)
ANION GAP SERPL CALCULATED.3IONS-SCNC: 5 MMOL/L (ref 4–13)
APTT PPP: 26 SECONDS (ref 23–37)
AST SERPL W P-5'-P-CCNC: 21 U/L (ref 13–39)
BASOPHILS # BLD AUTO: 0.1 THOUSANDS/ΜL (ref 0–0.1)
BASOPHILS NFR BLD AUTO: 1 % (ref 0–1)
BILIRUB SERPL-MCNC: 0.59 MG/DL (ref 0.2–1)
BUN SERPL-MCNC: 15 MG/DL (ref 5–25)
CALCIUM SERPL-MCNC: 9 MG/DL (ref 8.4–10.2)
CARDIAC TROPONIN I PNL SERPL HS: 19 NG/L
CARDIAC TROPONIN I PNL SERPL HS: 21 NG/L
CARDIAC TROPONIN I PNL SERPL HS: 23 NG/L
CHLORIDE SERPL-SCNC: 96 MMOL/L (ref 96–108)
CO2 SERPL-SCNC: 37 MMOL/L (ref 21–32)
CREAT SERPL-MCNC: 1.07 MG/DL (ref 0.6–1.3)
EOSINOPHIL # BLD AUTO: 0.11 THOUSAND/ΜL (ref 0–0.61)
EOSINOPHIL NFR BLD AUTO: 1 % (ref 0–6)
ERYTHROCYTE [DISTWIDTH] IN BLOOD BY AUTOMATED COUNT: 14.1 % (ref 11.6–15.1)
FLUAV RNA RESP QL NAA+PROBE: NEGATIVE
FLUBV RNA RESP QL NAA+PROBE: NEGATIVE
GFR SERPL CREATININE-BSD FRML MDRD: 66 ML/MIN/1.73SQ M
GLUCOSE SERPL-MCNC: 112 MG/DL (ref 65–140)
HCT VFR BLD AUTO: 47 % (ref 36.5–49.3)
HGB BLD-MCNC: 14.5 G/DL (ref 12–17)
IMM GRANULOCYTES # BLD AUTO: 0.28 THOUSAND/UL (ref 0–0.2)
IMM GRANULOCYTES NFR BLD AUTO: 2 % (ref 0–2)
INR PPP: 1.1 (ref 0.84–1.19)
LACTATE SERPL-SCNC: 1.3 MMOL/L (ref 0.5–2)
LYMPHOCYTES # BLD AUTO: 1.01 THOUSANDS/ΜL (ref 0.6–4.47)
LYMPHOCYTES NFR BLD AUTO: 7 % (ref 14–44)
MCH RBC QN AUTO: 31.9 PG (ref 26.8–34.3)
MCHC RBC AUTO-ENTMCNC: 30.9 G/DL (ref 31.4–37.4)
MCV RBC AUTO: 104 FL (ref 82–98)
MONOCYTES # BLD AUTO: 1.32 THOUSAND/ΜL (ref 0.17–1.22)
MONOCYTES NFR BLD AUTO: 9 % (ref 4–12)
NEUTROPHILS # BLD AUTO: 12.8 THOUSANDS/ΜL (ref 1.85–7.62)
NEUTS SEG NFR BLD AUTO: 80 % (ref 43–75)
NRBC BLD AUTO-RTO: 0 /100 WBCS
PLATELET # BLD AUTO: 306 THOUSANDS/UL (ref 149–390)
PMV BLD AUTO: 9.6 FL (ref 8.9–12.7)
POTASSIUM SERPL-SCNC: 4.1 MMOL/L (ref 3.5–5.3)
PROCALCITONIN SERPL-MCNC: 0.12 NG/ML
PROT SERPL-MCNC: 6.1 G/DL (ref 6.4–8.4)
PROTHROMBIN TIME: 14.2 SECONDS (ref 11.6–14.5)
RBC # BLD AUTO: 4.54 MILLION/UL (ref 3.88–5.62)
RSV RNA RESP QL NAA+PROBE: NEGATIVE
SARS-COV-2 RNA RESP QL NAA+PROBE: NEGATIVE
SODIUM SERPL-SCNC: 138 MMOL/L (ref 135–147)
WBC # BLD AUTO: 15.62 THOUSAND/UL (ref 4.31–10.16)

## 2023-03-28 PROCEDURE — 84145 PROCALCITONIN (PCT): CPT | Performed by: EMERGENCY MEDICINE

## 2023-03-28 PROCEDURE — 85025 COMPLETE CBC W/AUTO DIFF WBC: CPT | Performed by: EMERGENCY MEDICINE

## 2023-03-28 PROCEDURE — 93005 ELECTROCARDIOGRAM TRACING: CPT

## 2023-03-28 PROCEDURE — 99285 EMERGENCY DEPT VISIT HI MDM: CPT

## 2023-03-28 PROCEDURE — 84484 ASSAY OF TROPONIN QUANT: CPT | Performed by: EMERGENCY MEDICINE

## 2023-03-28 PROCEDURE — 80053 COMPREHEN METABOLIC PANEL: CPT | Performed by: EMERGENCY MEDICINE

## 2023-03-28 PROCEDURE — 87040 BLOOD CULTURE FOR BACTERIA: CPT | Performed by: EMERGENCY MEDICINE

## 2023-03-28 PROCEDURE — 71045 X-RAY EXAM CHEST 1 VIEW: CPT

## 2023-03-28 PROCEDURE — 85610 PROTHROMBIN TIME: CPT | Performed by: EMERGENCY MEDICINE

## 2023-03-28 PROCEDURE — 94664 DEMO&/EVAL PT USE INHALER: CPT

## 2023-03-28 PROCEDURE — 0241U HB NFCT DS VIR RESP RNA 4 TRGT: CPT | Performed by: EMERGENCY MEDICINE

## 2023-03-28 PROCEDURE — 94640 AIRWAY INHALATION TREATMENT: CPT

## 2023-03-28 PROCEDURE — 36415 COLL VENOUS BLD VENIPUNCTURE: CPT | Performed by: EMERGENCY MEDICINE

## 2023-03-28 PROCEDURE — 85730 THROMBOPLASTIN TIME PARTIAL: CPT | Performed by: EMERGENCY MEDICINE

## 2023-03-28 PROCEDURE — 96375 TX/PRO/DX INJ NEW DRUG ADDON: CPT

## 2023-03-28 PROCEDURE — 96365 THER/PROPH/DIAG IV INF INIT: CPT

## 2023-03-28 PROCEDURE — 99291 CRITICAL CARE FIRST HOUR: CPT | Performed by: EMERGENCY MEDICINE

## 2023-03-28 PROCEDURE — 99223 1ST HOSP IP/OBS HIGH 75: CPT | Performed by: NURSE PRACTITIONER

## 2023-03-28 PROCEDURE — 83605 ASSAY OF LACTIC ACID: CPT | Performed by: EMERGENCY MEDICINE

## 2023-03-28 RX ORDER — TIOTROPIUM BROMIDE 18 UG/1
18 CAPSULE ORAL; RESPIRATORY (INHALATION) DAILY
Status: DISCONTINUED | OUTPATIENT
Start: 2023-03-29 | End: 2023-03-30

## 2023-03-28 RX ORDER — ENOXAPARIN SODIUM 100 MG/ML
40 INJECTION SUBCUTANEOUS DAILY
Status: DISCONTINUED | OUTPATIENT
Start: 2023-03-29 | End: 2023-03-31 | Stop reason: HOSPADM

## 2023-03-28 RX ORDER — METHYLPREDNISOLONE SODIUM SUCCINATE 40 MG/ML
40 INJECTION INTRAMUSCULAR; INTRAVENOUS EVERY 12 HOURS SCHEDULED
Status: DISCONTINUED | OUTPATIENT
Start: 2023-03-29 | End: 2023-03-30

## 2023-03-28 RX ORDER — ACETAMINOPHEN 325 MG/1
650 TABLET ORAL EVERY 6 HOURS PRN
Status: DISCONTINUED | OUTPATIENT
Start: 2023-03-28 | End: 2023-03-31 | Stop reason: HOSPADM

## 2023-03-28 RX ORDER — GUAIFENESIN/DEXTROMETHORPHAN 100-10MG/5
5 SYRUP ORAL 3 TIMES DAILY PRN
Status: DISCONTINUED | OUTPATIENT
Start: 2023-03-28 | End: 2023-03-29

## 2023-03-28 RX ORDER — CEFTRIAXONE 2 G/50ML
2000 INJECTION, SOLUTION INTRAVENOUS EVERY 24 HOURS
Status: DISCONTINUED | OUTPATIENT
Start: 2023-03-29 | End: 2023-03-31 | Stop reason: HOSPADM

## 2023-03-28 RX ORDER — IPRATROPIUM BROMIDE AND ALBUTEROL SULFATE 2.5; .5 MG/3ML; MG/3ML
3 SOLUTION RESPIRATORY (INHALATION)
Status: DISCONTINUED | OUTPATIENT
Start: 2023-03-28 | End: 2023-03-28

## 2023-03-28 RX ORDER — METHYLPREDNISOLONE SODIUM SUCCINATE 40 MG/ML
40 INJECTION INTRAMUSCULAR; INTRAVENOUS EVERY 8 HOURS
Status: DISCONTINUED | OUTPATIENT
Start: 2023-03-28 | End: 2023-03-28

## 2023-03-28 RX ORDER — CEFTRIAXONE 2 G/50ML
2000 INJECTION, SOLUTION INTRAVENOUS ONCE
Status: COMPLETED | OUTPATIENT
Start: 2023-03-28 | End: 2023-03-28

## 2023-03-28 RX ORDER — ATORVASTATIN CALCIUM 80 MG/1
80 TABLET, FILM COATED ORAL
Status: DISCONTINUED | OUTPATIENT
Start: 2023-03-28 | End: 2023-03-28

## 2023-03-28 RX ORDER — POTASSIUM CHLORIDE 1500 MG/1
20 TABLET, EXTENDED RELEASE ORAL DAILY
Status: DISCONTINUED | OUTPATIENT
Start: 2023-03-29 | End: 2023-03-28

## 2023-03-28 RX ORDER — ATORVASTATIN CALCIUM 80 MG/1
80 TABLET, FILM COATED ORAL
Status: DISCONTINUED | OUTPATIENT
Start: 2023-03-28 | End: 2023-03-31 | Stop reason: HOSPADM

## 2023-03-28 RX ORDER — POTASSIUM CHLORIDE 1500 MG/1
20 TABLET, EXTENDED RELEASE ORAL
Status: DISCONTINUED | OUTPATIENT
Start: 2023-03-28 | End: 2023-03-31 | Stop reason: HOSPADM

## 2023-03-28 RX ORDER — FUROSEMIDE 40 MG/1
40 TABLET ORAL DAILY
Status: DISCONTINUED | OUTPATIENT
Start: 2023-03-29 | End: 2023-03-31 | Stop reason: HOSPADM

## 2023-03-28 RX ORDER — FLUTICASONE FUROATE AND VILANTEROL 200; 25 UG/1; UG/1
1 POWDER RESPIRATORY (INHALATION)
Status: DISCONTINUED | OUTPATIENT
Start: 2023-03-29 | End: 2023-03-29

## 2023-03-28 RX ORDER — IPRATROPIUM BROMIDE AND ALBUTEROL SULFATE 2.5; .5 MG/3ML; MG/3ML
3 SOLUTION RESPIRATORY (INHALATION) ONCE
Status: COMPLETED | OUTPATIENT
Start: 2023-03-28 | End: 2023-03-28

## 2023-03-28 RX ORDER — ALBUTEROL SULFATE 0.83 MG/ML
2.5 SOLUTION RESPIRATORY (INHALATION) EVERY 6 HOURS PRN
Status: DISCONTINUED | OUTPATIENT
Start: 2023-03-28 | End: 2023-03-29

## 2023-03-28 RX ORDER — METHYLPREDNISOLONE SODIUM SUCCINATE 40 MG/ML
40 INJECTION INTRAMUSCULAR; INTRAVENOUS ONCE
Status: COMPLETED | OUTPATIENT
Start: 2023-03-28 | End: 2023-03-28

## 2023-03-28 RX ORDER — METOPROLOL SUCCINATE 25 MG/1
12.5 TABLET, EXTENDED RELEASE ORAL 2 TIMES DAILY
Status: DISCONTINUED | OUTPATIENT
Start: 2023-03-28 | End: 2023-03-31 | Stop reason: HOSPADM

## 2023-03-28 RX ORDER — GABAPENTIN 300 MG/1
300 CAPSULE ORAL 2 TIMES DAILY
Status: DISCONTINUED | OUTPATIENT
Start: 2023-03-28 | End: 2023-03-31 | Stop reason: HOSPADM

## 2023-03-28 RX ORDER — ASPIRIN 81 MG/1
81 TABLET, CHEWABLE ORAL DAILY
Status: DISCONTINUED | OUTPATIENT
Start: 2023-03-28 | End: 2023-03-31 | Stop reason: HOSPADM

## 2023-03-28 RX ADMIN — ASPIRIN 81 MG 81 MG: 81 TABLET ORAL at 22:22

## 2023-03-28 RX ADMIN — METHYLPREDNISOLONE SODIUM SUCCINATE 40 MG: 40 INJECTION, POWDER, FOR SOLUTION INTRAMUSCULAR; INTRAVENOUS at 14:26

## 2023-03-28 RX ADMIN — POTASSIUM CHLORIDE 20 MEQ: 1500 TABLET, EXTENDED RELEASE ORAL at 22:22

## 2023-03-28 RX ADMIN — GABAPENTIN 300 MG: 300 CAPSULE ORAL at 18:23

## 2023-03-28 RX ADMIN — CEFTRIAXONE 2000 MG: 2 INJECTION, SOLUTION INTRAVENOUS at 14:29

## 2023-03-28 RX ADMIN — ATORVASTATIN CALCIUM 80 MG: 80 TABLET, FILM COATED ORAL at 18:28

## 2023-03-28 RX ADMIN — METOPROLOL SUCCINATE 12.5 MG: 25 TABLET, EXTENDED RELEASE ORAL at 22:21

## 2023-03-28 RX ADMIN — IPRATROPIUM BROMIDE AND ALBUTEROL SULFATE 3 ML: 2.5; .5 SOLUTION RESPIRATORY (INHALATION) at 14:38

## 2023-03-28 NOTE — ASSESSMENT & PLAN NOTE
· Increasing dyspnea on exacerbation, increasing oxygen requirement  · Patient reports this has been going on since COVID infection in January 2023  · Solu-Medrol 40 mg IV started in ER  · Continue steroids 40 mg IV twice daily for now  · Pulmonary consultation

## 2023-03-28 NOTE — ASSESSMENT & PLAN NOTE
· Chest x-ray with evidence for bibasilar pneumonia  · Recently hospitalized for pneumonia and the middle of March  · Afebrile but with leukocytosis  · Ceftriaxone IV started in ER-continue  · Viral swab negative for COVID flu and RSV

## 2023-03-28 NOTE — ASSESSMENT & PLAN NOTE
· 2 L nasal cannula medically saturating 83% now utilizing 5 L nasal cannula  · Suspect COPD exacerbation versus pneumonia  · Respiratory and oxygen protocol  · Monitor oxygen requirements and wean as tolerated

## 2023-03-28 NOTE — ED PROVIDER NOTES
History  Chief Complaint   Patient presents with   • Shortness of Breath     Pt reports sob for three days  Pt has been requiring more oxygen at home  74-year-old male presents emergency room noting increasing shortness of breath which has been ongoing and worsening over the last couple of weeks  The patient notes he has been diagnosed with a right-sided pneumothorax due to a nerve block which was done on his right chest in the past, roughly 3 weeks ago, as well as multiple episodes of pneumonia and exacerbations of COPD  Patient notes that over the last few months his O2 requirement has gone from 2 to 4 L/min, and even with this, walking in the hallway drops his O2 sat into the 60s  Patient denies any fever chills or pain at this time  Patient is here for evaluation due to worsening symptomatology and lower O2 sats  Prior to Admission Medications   Prescriptions Last Dose Informant Patient Reported? Taking? Blood Pressure Monitor KIT   No No   Sig: Use daily   Patient taking differently: Use 40 mg daily   Fluticasone-Salmeterol (Wixela Inhub) 500-50 mcg/dose inhaler 3/28/2023  No Yes   Sig: Inhale 1 puff 2 (two) times a day Rinse mouth after use     Tiotropium Bromide Monohydrate (SPIRIVA RESPIMAT IN) 3/28/2023  Yes Yes   Sig: Inhale every morning   albuterol (2 5 mg/3 mL) 0 083 % nebulizer solution 3/28/2023  No Yes   Sig: Take 3 mL (2 5 mg total) by nebulization every 6 (six) hours as needed for wheezing or shortness of breath   aspirin 81 mg chewable tablet 3/27/2023  Yes Yes   Si mg daily at bedtime   azithromycin (ZITHROMAX) 500 MG tablet   No No   Sig: Take 1 tablet (500 mg total) by mouth 3 (three) times a week   Patient taking differently: Take by mouth 3 (three) times a week    dextromethorphan-guaiFENesin (ROBITUSSIN DM)  mg/5 mL syrup 3/27/2023  No Yes   Sig: Take 5 mL by mouth 3 (three) times a day as needed for cough   famotidine (PEPCID) 20 mg tablet 3/27/2023 No Yes   Sig: Take 1 tablet (20 mg total) by mouth daily at bedtime   fluticasone (FLONASE) 50 mcg/act nasal spray 3/27/2023  Yes Yes   Sig: into each nostril as needed    furosemide (LASIX) 40 mg tablet 3/28/2023  No Yes   Sig: Take 1 tablet (40 mg total) by mouth daily   gabapentin (NEURONTIN) 300 mg capsule 3/28/2023  No Yes   Sig: Take 1 capsule (300 mg total) by mouth 2 (two) times a day   metoprolol succinate (TOPROL-XL) 25 mg 24 hr tablet 3/28/2023  No Yes   Sig: Take 0 5 tablets (12 5 mg total) by mouth 2 (two) times a day   oxygen gas 3/28/2023  Yes Yes   Sig: Inhale 2 L/min continuous   2LPM at rest and 3LPM with activity per D Cedric FANG notes  Indications: SOB, pulmonary edema suspected   potassium chloride (Klor-Con) 10 mEq tablet   No No   Sig: Take 2 tablets (20 mEq total) by mouth daily   Patient taking differently: Take 20 mEq by mouth 2 (two) times a day 20 mEq in the am, and 10 mEq at night   rosuvastatin (CRESTOR) 40 MG tablet 3/27/2023  No Yes   Sig: TAKE 1 TABLET DAILY   Patient taking differently: Take 40 mg by mouth daily at bedtime      Facility-Administered Medications: None       Past Medical History:   Diagnosis Date   • Bilateral carotid artery stenosis    • Cancer (HCC)     skin   • Chronic diastolic heart failure (HCC)    • Chronic ischemic heart disease    • Chronic kidney disease     stage 3   • Colon polyp    • COPD (chronic obstructive pulmonary disease) ([facility] )    • Coronary artery disease     hx stents, MI, PCI   • COVID 11/2021   • CPAP (continuous positive airway pressure) dependence    • Emphysema of lung ([facility] ) 1995    started   • Hearing loss    • History of transfusion 1995   • Hyperlipidemia    • Hypertension    • Lung nodule 2023    x rays   • MI (myocardial infarction) ([facility] ) 1995   • Myocardial infarction ([facility] ) 1995   • Pneumonia    • Pneumothorax feb 20,2023    collapsed lung   • Prostate cancer ([facility] )    • RSV (respiratory syncytial virus infection) 10/2022   • S/P carotid endarterectomy    • Shortness of breath     O2 2 l/nc PRN   • Sleep apnea    • Sleep apnea, obstructive    • Stented coronary artery        Past Surgical History:   Procedure Laterality Date   • APPENDECTOMY     • CARDIAC CATHETERIZATION  2021    left main with no significant disease, proximal LAD with 10% stenosis at the site of prior stent, left circumflex artery with minimal luminal irregularities mid RCA with 50% stenosis at site of prior stent and PL segment with distal disease supplied by collaterals from the distal circumflex with no significant CAD requiring revascularization at that time     • CATARACT EXTRACTION, BILATERAL Bilateral    • COLONOSCOPY     • CORONARY ANGIOPLASTY WITH STENT PLACEMENT      RCA   • CORONARY ANGIOPLASTY WITH STENT PLACEMENT      RCA   • CORONARY ANGIOPLASTY WITH STENT PLACEMENT      LAD   • EYE SURGERY     • NM BX PROSTATE STRTCTC SATURATION SAMPLING IMG GID N/A 2022    Procedure: TRANSPERINEAL MRI FUSION  BIOPSY PROSTATE;  Surgeon: Daisy Bridges MD;  Location: [facility];  Service: Urology   • NM NEUROPLASTY &/TRANSPOS MEDIAN NRV Marisue Pro Left 2022    Procedure: RELEASE CARPAL TUNNEL;  Surgeon: Floy Severance, MD;  Location: [facility];  Service: Orthopedics   • NM NEUROPLASTY &/TRANSPOSITION ULNAR NERVE ELBOW Left 2022    Procedure: RELEASE CUBITAL TUNNEL;  Surgeon: Floy Severance, MD;  Location: [facility];  Service: Orthopedics   • NM NEUROPLASTY &/TRANSPOSITION ULNAR NERVE WRIST Left 2022    Procedure: Lidia Worrell;  Surgeon: Floy Severance, MD;  Location: [facility];  Service: Orthopedics   • SKIN CANCER EXCISION      chin-per pt, basal cell  also right ankle   • TONSILLECTOMY  no       Family History   Problem Relation Age of Onset   • Heart attack Mother    • Dementia Mother    • Heart failure Mother             • No Known Problems Father      I have reviewed and agree with the history as documented  E-Cigarette/Vaping   • E-Cigarette Use Never User      E-Cigarette/Vaping Substances   • Nicotine No    • THC No    • CBD No    • Flavoring No    • Other No    • Unknown No      Social History     Tobacco Use   • Smoking status: Former     Packs/day: 2 00     Years: 35 00     Pack years: 70 00     Types: Cigarettes     Start date: 1960     Quit date: 1995     Years since quittin 7   • Smokeless tobacco: Never   • Tobacco comments:     stopped    Vaping Use   • Vaping Use: Never used   Substance Use Topics   • Alcohol use: Never   • Drug use: Never       Review of Systems   Constitutional: Positive for fatigue  Negative for chills and fever  HENT: Negative for ear pain and sore throat  Eyes: Negative for pain and visual disturbance  Respiratory: Positive for cough and shortness of breath  Cardiovascular: Negative for chest pain and palpitations  Gastrointestinal: Negative for abdominal pain and vomiting  Genitourinary: Negative for dysuria and hematuria  Musculoskeletal: Negative for arthralgias and back pain  Skin: Negative for color change and rash  Neurological: Positive for weakness  Negative for seizures and syncope  All other systems reviewed and are negative  Physical Exam  Physical Exam  Constitutional:       General: He is not in acute distress  Appearance: Normal appearance  He is normal weight  He is not ill-appearing  HENT:      Head: Normocephalic and atraumatic  Right Ear: External ear normal       Left Ear: External ear normal       Nose: Nose normal       Mouth/Throat:      Mouth: Mucous membranes are moist    Eyes:      Conjunctiva/sclera: Conjunctivae normal    Cardiovascular:      Rate and Rhythm: Normal rate and regular rhythm  Pulses: Normal pulses  Heart sounds: Normal heart sounds  Pulmonary:      Effort: Pulmonary effort is normal  Tachypnea present        Breath sounds: Examination of the right-upper field reveals decreased breath sounds  Examination of the left-upper field reveals decreased breath sounds  Examination of the right-middle field reveals decreased breath sounds  Examination of the left-middle field reveals decreased breath sounds  Examination of the right-lower field reveals decreased breath sounds and rhonchi  Examination of the left-lower field reveals decreased breath sounds and rhonchi  Decreased breath sounds and rhonchi present  Chest:      Chest wall: No mass, deformity or tenderness  Abdominal:      General: Abdomen is flat  There is no distension  Palpations: Abdomen is soft  There is no mass  Musculoskeletal:         General: No swelling, tenderness or deformity  Normal range of motion  Cervical back: Normal range of motion  Skin:     General: Skin is warm and dry  Capillary Refill: Capillary refill takes 2 to 3 seconds  Coloration: Skin is not pale  Neurological:      General: No focal deficit present  Mental Status: He is alert and oriented to person, place, and time  Mental status is at baseline     Psychiatric:         Mood and Affect: Mood normal          Vital Signs  ED Triage Vitals [03/28/23 1314]   Temperature Pulse Respirations Blood Pressure SpO2   (!) 97 3 °F (36 3 °C) 88 20 110/51 (!) 83 %      Temp Source Heart Rate Source Patient Position - Orthostatic VS BP Location FiO2 (%)   Tympanic Monitor Sitting Left arm --      Pain Score       No Pain           Vitals:    03/28/23 1652 03/28/23 1701 03/28/23 2219 03/29/23 0802   BP: 114/76 114/76 118/69 135/75   Pulse:  85 104 75   Patient Position - Orthostatic VS:  Sitting  Lying         Visual Acuity      ED Medications  Medications   fluticasone-vilanterol 200-25 mcg/actuation 1 puff (1 puff Inhalation Given 3/29/23 0905)   furosemide (LASIX) tablet 40 mg (40 mg Oral Given 3/29/23 0905)   gabapentin (NEURONTIN) capsule 300 mg (300 mg Oral Given 3/29/23 0905)   metoprolol succinate (TOPROL-XL) 24 hr tablet 12 5 mg (12 5 mg Oral Given 3/29/23 0905)   tiotropium (SPIRIVA) capsule for inhaler 18 mcg (18 mcg Inhalation Given 3/29/23 0905)   albuterol inhalation solution 2 5 mg (has no administration in time range)   aspirin chewable tablet 81 mg (81 mg Oral Given 3/28/23 2222)   acetaminophen (TYLENOL) tablet 650 mg (has no administration in time range)   enoxaparin (LOVENOX) subcutaneous injection 40 mg (40 mg Subcutaneous Given 3/29/23 0905)   methylPREDNISolone sodium succinate (Solu-MEDROL) injection 40 mg (40 mg Intravenous Given 3/29/23 0904)   cefTRIAXone (ROCEPHIN) IVPB (premix in dextrose) 2,000 mg 50 mL (has no administration in time range)   potassium chloride (K-DUR,KLOR-CON) CR tablet 20 mEq (20 mEq Oral Given 3/28/23 2222)   atorvastatin (LIPITOR) tablet 80 mg (80 mg Oral Not Given 3/28/23 2221)   dextromethorphan-guaiFENesin (ROBITUSSIN DM) oral syrup 10 mL (has no administration in time range)   cefTRIAXone (ROCEPHIN) IVPB (premix in dextrose) 2,000 mg 50 mL (0 mg Intravenous Stopped 3/28/23 1459)   methylPREDNISolone sodium succinate (Solu-MEDROL) injection 40 mg (40 mg Intravenous Given 3/28/23 1426)   ipratropium-albuterol (DUO-NEB) 0 5-2 5 mg/3 mL inhalation solution 3 mL (3 mL Nebulization Given 3/28/23 1438)       Diagnostic Studies  Results Reviewed     Procedure Component Value Units Date/Time    Blood culture #1 [[id number]] Collected: 03/28/23 1400    Lab Status: Preliminary result Specimen: Blood from Arm, Left Updated: 03/28/23 2201     Blood Culture Received in Microbiology Lab  Culture in Progress  Blood culture #2 [[id number]] Collected: 03/28/23 1358    Lab Status: Preliminary result Specimen: Blood from Arm, Right Updated: 03/28/23 2201     Blood Culture Received in Microbiology Lab  Culture in Progress      HS Troponin I 4hr [[id number]]  (Normal) Collected: 03/28/23 1719    Lab Status: Final result Specimen: Blood from Hand, Right Updated: 03/28/23 1801     hs TnI 4hr 19 ng/L Delta 4hr hsTnI -4 ng/L     HS Troponin I 2hr [[id number]]  (Normal) Collected: 03/28/23 1529    Lab Status: Final result Specimen: Blood from Arm, Right Updated: 03/28/23 1555     hs TnI 2hr 21 ng/L      Delta 2hr hsTnI -2 ng/L     FLU/RSV/COVID - if FLU/RSV clinically relevant [[id number]]  (Normal) Collected: 03/28/23 1358    Lab Status: Final result Specimen: Nares from Nose Updated: 03/28/23 1442     SARS-CoV-2 Negative     INFLUENZA A PCR Negative     INFLUENZA B PCR Negative     RSV PCR Negative    Narrative:      FOR PEDIATRIC PATIENTS - copy/paste COVID Guidelines URL to browser: [email or web address]    SARS-CoV-2 assay is a Nucleic Acid Amplification assay intended for the  qualitative detection of nucleic acid from SARS-CoV-2 in nasopharyngeal  swabs  Results are for the presumptive identification of SARS-CoV-2 RNA  Positive results are indicative of infection with SARS-CoV-2, the virus  causing COVID-19, but do not rule out bacterial infection or co-infection  with other viruses  Laboratories within the [place] and its  territories are required to report all positive results to the appropriate  public health authorities  Negative results do not preclude SARS-CoV-2  infection and should not be used as the sole basis for treatment or other  patient management decisions  Negative results must be combined with  clinical observations, patient history, and epidemiological information  This test has not been FDA cleared or approved  This test has been authorized by FDA under an Emergency Use Authorization  (EUA)  This test is only authorized for the duration of time the  declaration that circumstances exist justifying the authorization of the  emergency use of an in vitro diagnostic tests for detection of SARS-CoV-2  virus and/or diagnosis of COVID-19 infection under section 564(b)(1) of  the Act, 21 U  S C  705QEN-5(Y)(4), unless the authorization is terminated  or revoked sooner  The test has been validated but independent review by FDA  and CLIA is pending  Test performed using OLSET GeneXpert: This RT-PCR assay targets N2,  a region unique to SARS-CoV-2  A conserved region in the E-gene was chosen  for pan-Sarbecovirus detection which includes SARS-CoV-2  According to CMS-2020-01-R, this platform meets the definition of high-throughput technology  Procalcitonin [[id number]]  (Normal) Collected: 03/28/23 1358    Lab Status: Final result Specimen: Blood from Arm, Right Updated: 03/28/23 1436     Procalcitonin 0 12 ng/ml     Lactic acid [[id number]]  (Normal) Collected: 03/28/23 1358    Lab Status: Final result Specimen: Blood from Arm, Right Updated: 03/28/23 1429     LACTIC ACID 1 3 mmol/L     Narrative:      Result may be elevated if tourniquet was used during collection      APTT [[id number]]  (Normal) Collected: 03/28/23 1358    Lab Status: Final result Specimen: Blood from Arm, Right Updated: 03/28/23 1424     PTT 26 seconds     Protime-INR [[id number]]  (Normal) Collected: 03/28/23 1358    Lab Status: Final result Specimen: Blood from Arm, Right Updated: 03/28/23 1424     Protime 14 2 seconds      INR 1 10    HS Troponin 0hr (reflex protocol) [[id number]]  (Normal) Collected: 03/28/23 1331    Lab Status: Final result Specimen: Blood from Arm, Right Updated: 03/28/23 1359     hs TnI 0hr 23 ng/L     Comprehensive metabolic panel [[id number]]  (Abnormal) Collected: 03/28/23 1331    Lab Status: Final result Specimen: Blood from Arm, Right Updated: 03/28/23 1352     Sodium 138 mmol/L      Potassium 4 1 mmol/L      Chloride 96 mmol/L      CO2 37 mmol/L      ANION GAP 5 mmol/L      BUN 15 mg/dL      Creatinine 1 07 mg/dL      Glucose 112 mg/dL      Calcium 9 0 mg/dL      AST 21 U/L      ALT 20 U/L      Alkaline Phosphatase 60 U/L      Total Protein 6 1 g/dL      Albumin 3 5 g/dL      Total Bilirubin 0 59 mg/dL      eGFR 66 ml/min/1 73sq m     Narrative: National Kidney Disease Foundation guidelines for Chronic Kidney Disease (CKD):   •  Stage 1 with normal or high GFR (GFR > 90 mL/min/1 73 square meters)  •  Stage 2 Mild CKD (GFR = 60-89 mL/min/1 73 square meters)  •  Stage 3A Moderate CKD (GFR = 45-59 mL/min/1 73 square meters)  •  Stage 3B Moderate CKD (GFR = 30-44 mL/min/1 73 square meters)  •  Stage 4 Severe CKD (GFR = 15-29 mL/min/1 73 square meters)  •  Stage 5 End Stage CKD (GFR <15 mL/min/1 73 square meters)  Note: GFR calculation is accurate only with a steady state creatinine    CBC and differential [[id number]]  (Abnormal) Collected: 03/28/23 1331    Lab Status: Final result Specimen: Blood from Arm, Right Updated: 03/28/23 1341     WBC 15 62 Thousand/uL      RBC 4 54 Million/uL      Hemoglobin 14 5 g/dL      Hematocrit 47 0 %       fL      MCH 31 9 pg      MCHC 30 9 g/dL      RDW 14 1 %      MPV 9 6 fL      Platelets 280 Thousands/uL      nRBC 0 /100 WBCs      Neutrophils Relative 80 %      Immat GRANS % 2 %      Lymphocytes Relative 7 %      Monocytes Relative 9 %      Eosinophils Relative 1 %      Basophils Relative 1 %      Neutrophils Absolute 12 80 Thousands/µL      Immature Grans Absolute 0 28 Thousand/uL      Lymphocytes Absolute 1 01 Thousands/µL      Monocytes Absolute 1 32 Thousand/µL      Eosinophils Absolute 0 11 Thousand/µL      Basophils Absolute 0 10 Thousands/µL                  XR chest 1 view portable   ED Interpretation by Tae Nj DO (03/28 1415)   Right basilar pneumonia, improved from previous image but still present  Possible left basilar pneumonia as well      Final Result by Michi Chase MD (03/28 2019)      Severe emphysema with persistent bibasilar pneumonia, greater on the right  Workstation performed: [id number]                    Procedures  ECG 12 Lead Documentation Only    Date/Time: 3/28/2023 1:46 PM  Performed by: Tae Nj DO  Authorized by: Ashley Bustamante Jamir Muñiz DO     ECG reviewed by me, the ED Provider: yes    Patient location:  ED  Comments:      EKG shows a sinus rhythm at 88 beats a minute with a normal axis  There is an occasional PVC noted as well as a intraventricular conduction delay  There is no definitive acute ST or T wave changes  CriticalCare Time    Date/Time: 3/29/2023 11:01 AM  Performed by: Julienne Parker DO  Authorized by: Julienne Parker DO     Critical care provider statement:     Critical care time (minutes):  52    Critical care was necessary to treat or prevent imminent or life-threatening deterioration of the following conditions:  Respiratory failure    Critical care was time spent personally by me on the following activities:  Development of treatment plan with patient or surrogate, evaluation of patient's response to treatment, examination of patient, ordering and review of laboratory studies, ordering and review of radiographic studies, re-evaluation of patient's condition and review of old charts             ED Course  ED Course as of 03/29/23 1106   Tue Mar 28, 2023   1344 WBC(!): 15 62   1455 MCH: 31 9                               SBIRT 20yo+    Flowsheet Row Most Recent Value   SBIRT (23 yo +)    In order to provide better care to our patients, we are screening all of our patients for alcohol and drug use  Would it be okay to ask you these screening questions? Yes Filed at: 03/28/2023 1402   Initial Alcohol Screen: US AUDIT-C     1  How often do you have a drink containing alcohol? 0 Filed at: 03/28/2023 1402   2  How many drinks containing alcohol do you have on a typical day you are drinking? 0 Filed at: 03/28/2023 1402   3a  Male UNDER 65: How often do you have five or more drinks on one occasion? 0 Filed at: 03/28/2023 1402   3b  FEMALE Any Age, or MALE 65+: How often do you have 4 or more drinks on one occassion?  0 Filed at: 03/28/2023 1402   Audit-C Score 0 Filed at: 03/28/2023 1402   CRISTOPHER: How many times in the past year have you    Used an illegal drug or used a prescription medication for non-medical reasons? Never Filed at: 03/28/2023 1402                    Medical Decision Making  Patient is a 71-year-old male with history of COPD as well as pneumothorax and pneumonia who presents to the emergency room due to worsening shortness of breath and the need for higher oxygen delivery at home  The patient notes that even at 4 L, which is twice his normal amount, ambulating through his hallways, brings his O2 sat into the 60s  Patient has been in and out of the hospital recently for infections as well as a pneumothorax caused by a nerve block for right-sided chest pain  Patient denies any fever chills but admits to cough  Acute on chronic respiratory failure with hypoxia [facility]):     Details: Patient given steroids, antibiotics as well as nebulizer treatments with attempt to improve the patient's condition  Hypoxia:     Details: O2 sat monitored in the emergency department with supplemental oxygen to keep sats above 90%  Amount and/or Complexity of Data Reviewed  Independent Historian: spouse     Details: Patient spouse is at the bedside which helps relate most of the patient's recent past medical history regarding his pulmonary status  Labs: ordered  Decision-making details documented in ED Course  Details: Lab work reviewed  Radiology: ordered and independent interpretation performed  Details: X-ray reviewed which shows persistent basilar infiltrates  Risk  OTC drugs  Prescription drug management  Decision regarding hospitalization            Disposition  Final diagnoses:   Hypoxia   Pneumonia   COPD exacerbation ([facility] )   Acute on chronic respiratory failure with hypoxia ([facility] [address] )     Time reflects when diagnosis was documented in both MDM as applicable and the Disposition within this note     Time User Action Codes Description Comment    3/28/2023  2:18 PM Mauricio Munguia Add [R09 02] Hypoxia     3/28/2023  2:18 PM Ac Diaz Add [J18 9] Pneumonia     3/28/2023  3:15 PM Wen Granado Guy Add [J44 1] COPD exacerbation ([facility] )     3/28/2023  7:21 PM Torres Ji Add [J96 21] Acute on chronic respiratory failure with hypoxia [facility])       ED Disposition     ED Disposition   Admit    Condition   Stable    Date/Time   Tue Mar 28, 2023  3:15 PM    Comment   Case was discussed with Kaye Wong and the patient's admission status was agreed to be Admission Status: inpatient status to the service of Dr Florecita Gaona   Follow-up Information    None         Current Discharge Medication List      CONTINUE these medications which have NOT CHANGED    Details   albuterol (2 5 mg/3 mL) 0 083 % nebulizer solution Take 3 mL (2 5 mg total) by nebulization every 6 (six) hours as needed for wheezing or shortness of breath  Qty: 60 mL, Refills: 5    Associated Diagnoses: COPD exacerbation (HCC)      aspirin 81 mg chewable tablet 81 mg daily at bedtime      dextromethorphan-guaiFENesin (ROBITUSSIN DM)  mg/5 mL syrup Take 5 mL by mouth 3 (three) times a day as needed for cough  Qty: 354 mL, Refills: 0    Associated Diagnoses: COPD with exacerbation (HCC)      famotidine (PEPCID) 20 mg tablet Take 1 tablet (20 mg total) by mouth daily at bedtime  Qty: 100 tablet, Refills: 1    Associated Diagnoses: Gastroesophageal reflux disease, unspecified whether esophagitis present      fluticasone (FLONASE) 50 mcg/act nasal spray into each nostril as needed       Fluticasone-Salmeterol (Wixela Inhub) 500-50 mcg/dose inhaler Inhale 1 puff 2 (two) times a day Rinse mouth after use  Qty: 180 blister, Refills: 3    Comments: Substitution to a formulary equivalent within the same pharmaceutical class is authorized    Associated Diagnoses: Chronic obstructive pulmonary disease, unspecified COPD type (HCC)      furosemide (LASIX) 40 mg tablet Take 1 tablet (40 mg total) by mouth daily  Qty: 100 tablet, Refills: 3 Associated Diagnoses: Chronic ischemic heart disease      gabapentin (NEURONTIN) 300 mg capsule Take 1 capsule (300 mg total) by mouth 2 (two) times a day    Associated Diagnoses: Chronic pain syndrome; Intercostal neuralgia; Chronic right-sided thoracic back pain      metoprolol succinate (TOPROL-XL) 25 mg 24 hr tablet Take 0 5 tablets (12 5 mg total) by mouth 2 (two) times a day  Qty: 90 tablet, Refills: 0    Associated Diagnoses: Chest pain, unspecified type      oxygen gas Inhale 2 L/min continuous  2LPM at rest and 3LPM with activity per D Cedric FANG notes  Indications: SOB, pulmonary edema suspected      rosuvastatin (CRESTOR) 40 MG tablet TAKE 1 TABLET DAILY  Qty: 100 tablet, Refills: 3    Associated Diagnoses: Hyperlipidemia, unspecified hyperlipidemia type      Tiotropium Bromide Monohydrate (SPIRIVA RESPIMAT IN) Inhale every morning      azithromycin (ZITHROMAX) 500 MG tablet Take 1 tablet (500 mg total) by mouth 3 (three) times a week  Qty: 12 tablet, Refills: 2    Associated Diagnoses: Chronic bronchitis, unspecified chronic bronchitis type (HCC)      Blood Pressure Monitor KIT Use daily  Qty: 1 kit, Refills: 0    Associated Diagnoses: Primary hypertension      potassium chloride (Klor-Con) 10 mEq tablet Take 2 tablets (20 mEq total) by mouth daily  Qty: 200 tablet, Refills: 3    Associated Diagnoses: Essential hypertension             No discharge procedures on file      PDMP Review       Value Time User    PDMP Reviewed  Yes 7/22/2022  8:46 AM Baldomero Cabrera PA-C          ED Provider  Electronically Signed by           De Brunner , DO  03/29/23 [id number]

## 2023-03-28 NOTE — ASSESSMENT & PLAN NOTE
Wt Readings from Last 3 Encounters:   03/28/23 91 kg (200 lb 9 9 oz)   03/23/23 91 2 kg (201 lb)   03/22/23 93 4 kg (206 lb)       · Does not appear volume overloaded  · Continue Lasix  · Daily weights and I & O's

## 2023-03-28 NOTE — RESPIRATORY THERAPY NOTE
RT Protocol Note  Virginia Bread 65 y o  male MRN: [id number]  Unit/Bed#: -01 Encounter: [id number]    Assessment    Principal Problem:    Acute on chronic respiratory failure with hypoxia [facility])  Active Problems:    Coronary artery disease involving native coronary artery of native heart without angina pectoris    Hypertension    COPD with exacerbation (HCC)    Chronic diastolic (congestive) heart failure (HCC)      Home Pulmonary Medications:  Spireva  2 5mg albuterol Q6prn  Home Devices/Therapy: (P) Home O2    Past Medical History:   Diagnosis Date    Bilateral carotid artery stenosis     Cancer (HCC)     skin    Chronic diastolic heart failure (HCC)     Chronic ischemic heart disease     Chronic kidney disease     stage 3    Colon polyp     COPD (chronic obstructive pulmonary disease) ([facility])     Coronary artery disease     hx stents, MI, PCI    COVID 2021    CPAP (continuous positive airway pressure) dependence     Emphysema of lung ([address] )     started    Hearing loss     History of transfusion     Hyperlipidemia     Hypertension     Lung nodule     x rays    MI (myocardial infarction) [address] )     Myocardial infarction ([facility] [address] )     Pneumonia     Pneumothorax     collapsed lung    Prostate cancer ([facility] [address] )     RSV (respiratory syncytial virus infection) 10/2022    S/P carotid endarterectomy     Shortness of breath     O2 2 l/nc PRN    Sleep apnea     Sleep apnea, obstructive     Stented coronary artery      Social History     Socioeconomic History    Marital status: /Civil Union     Spouse name: None    Number of children: None    Years of education: None    Highest education level: None   Occupational History    None   Tobacco Use    Smoking status: Former     Packs/day: 2 00     Years: 35 00     Pack years: 70 00     Types: Cigarettes     Start date: 1960     Quit date: 1995     Years since quittin 7    Smokeless tobacco: Never    Tobacco comments:     stopped "1995   Vaping Use    Vaping Use: Never used   Substance and Sexual Activity    Alcohol use: Never    Drug use: Never    Sexual activity: Not Currently     Partners: Female     Birth control/protection: None   Other Topics Concern    None   Social History Narrative    None     Social Determinants of Health     Financial Resource Strain: Not on file   Food Insecurity: No Food Insecurity    Worried About Running Out of Food in the Last Year: Never true    Ran Out of Food in the Last Year: Never true   Transportation Needs: No Transportation Needs    Lack of Transportation (Medical): No    Lack of Transportation (Non-Medical): No   Physical Activity: Not on file   Stress: Not on file   Social Connections: Not on file   Intimate Partner Violence: Not on file   Housing Stability: Low Risk     Unable to Pay for Housing in the Last Year: No    Number of Places Lived in the Last Year: 1    Unstable Housing in the Last Year: No       Subjective         Objective    Physical Exam:   Assessment Type: (P) Assess only  General Appearance: (P) Alert, Awake  Respiratory Pattern: (P) Normal  Chest Assessment: (P) Chest expansion symmetrical  Bilateral Breath Sounds: (P) Diminished    Vitals:  Blood pressure 114/76, pulse 85, temperature 98 4 °F (36 9 °C), resp  rate 18, height 5' 8\" (1 727 m), weight 91 2 kg (201 lb), SpO2 92 %  Imaging and other studies: I have personally reviewed pertinent reports  Plan    Respiratory Plan: (P) Home Bronchodilator Patient pathway  Airway Clearance Plan: (P) Discontinue Protocol     Resp Comments: (P) Pt evaluated per respiratory protocal  Pt admitted with pneumonia  Pt also has a hx of COPD  Pt wears 2-3 liters of O2 at home  Physician has ordered home therapy, plan is to cont this as ordered   No ariway clearence needed at this time, IS in contraindicated due to pts COPD    "

## 2023-03-28 NOTE — H&P
[facility] 44  H&P  Name: Mary Butt  MRN: [id number]  Unit/Bed#: -01 I Date of Admission: 3/28/2023   Date of Service: 3/28/2023 I Hospital Day: 0      Assessment/Plan   * Acute on chronic respiratory failure with hypoxia [facility])  Assessment & Plan  · 2 L nasal cannula medically saturating 83% now utilizing 5 L nasal cannula  · Suspect COPD exacerbation versus pneumonia  · Respiratory and oxygen protocol  · Monitor oxygen requirements and wean as tolerated    COPD with exacerbation (HCC)  Assessment & Plan  · Increasing dyspnea on exacerbation, increasing oxygen requirement  · Patient reports this has been going on since COVID infection in January 2023  · Solu-Medrol 40 mg IV started in ER  · Continue steroids 40 mg twice daily for now  · Pulmonary consultation    Pneumonia  Assessment & Plan  · Chest x-ray with evidence for bibasilar pneumonia  · Recently hospitalized for pneumonia and the middle of March  · Afebrile but with leukocytosis  · Ceftriaxone IV started in ER-continue  · Viral swab negative for COVID flu and RSV    Chronic diastolic (congestive) heart failure (HCC)  Assessment & Plan  Wt Readings from Last 3 Encounters:   03/28/23 91 kg (200 lb 9 9 oz)   03/23/23 91 2 kg (201 lb)   03/22/23 93 4 kg (206 lb)       · Does not appear volume overloaded  · Continue Lasix  · Daily weights and I & O's      Coronary artery disease involving native coronary artery of native heart without angina pectoris  Assessment & Plan  · Continue aspirin and statin  · Monitor for chest pain            VTE Prophylaxis: Enoxaparin (Lovenox)    Code Status: Full  POLST: POLST form is not discussed and not completed at this time  Discussion with family: Patient    Anticipated Length of Stay:  Patient will be admitted on an Inpatient basis with an anticipated length of stay of greater than 2 midnights  Justification for Hospital Stay: COPD exacerbation      Total Time for Visit, including Counseling / Coordination of Care: 60 minutes  Greater than 50% of this total time spent on direct patient counseling and coordination of care  Chief Complaint:   Shortness of breath, dyspnea on exertion    History of Present Illness:    Lakisha Mane is a 65 y o  male who presents with shortness of breath and dyspnea on exertion  Requires oxygen 2 L nasal cannula at baseline, but he has been requiring up to 4 L  He says that this has occurred since he was infected with COVID in January 2023  His wife was also infected with COVID and is currently a patient in the ICU for similar issues  In the ER, x-ray was performed with suspicion for pneumonia  He was started on IV antibiotics, as well as IV steroids for COPD exacerbation  He expresses frustration  He is asking to see a lung doctor  He denies fever, chest pain, abdominal pain, arthralgia, syncope, dizziness, focal weakness, skin color change, or confusion  Review of Systems:    Review of Systems   Constitutional: Positive for activity change  Negative for chills and fever  HENT: Negative for congestion  Eyes: Negative for visual disturbance  Respiratory: Positive for cough, chest tightness and shortness of breath  Cardiovascular: Negative for chest pain and palpitations  Gastrointestinal: Negative for abdominal pain and vomiting  Genitourinary: Negative for dysuria  Musculoskeletal: Negative for arthralgias  Skin: Negative for color change  Neurological: Negative for syncope  Psychiatric/Behavioral: Negative for confusion  All other systems reviewed and are negative        Past Medical and Surgical History:     Past Medical History:   Diagnosis Date   • Bilateral carotid artery stenosis    • Cancer (HCC)     skin   • Chronic diastolic heart failure (HCC)    • Chronic ischemic heart disease    • Chronic kidney disease     stage 3   • Colon polyp    • COPD (chronic obstructive pulmonary disease) (HCC)    • Coronary artery disease     hx stents, MI, PCI   • COVID 11/2021   • CPAP (continuous positive airway pressure) dependence    • Emphysema of lung ([facility] [address] ) 1995    started   • Hearing loss    • History of transfusion 1995   • Hyperlipidemia    • Hypertension    • Lung nodule 2023    x rays   • MI (myocardial infarction) ([facility] [address] ) 1995   • My[facility] infarction ([facility] 1995   • Pneumonia    • Pneumothorax feb 20,2023    collapsed lung   • Prostate cancer ([facility] )    • RSV (respiratory syncytial virus infection) 10/2022   • S/P carotid endarterectomy    • Shortness of breath     O2 2 l/nc PRN   • Sleep apnea    • Sleep apnea, obstructive    • Stented coronary artery        Past Surgical History:   Procedure Laterality Date   • APPENDECTOMY     • CARDIAC CATHETERIZATION  03/18/2021    left main with no significant disease, proximal LAD with 10% stenosis at the site of prior stent, left circumflex artery with minimal luminal irregularities mid RCA with 50% stenosis at site of prior stent and PL segment with distal disease supplied by collaterals from the distal circumflex with no significant CAD requiring revascularization at that time     • CATARACT EXTRACTION, BILATERAL Bilateral    • COLONOSCOPY     • CORONARY ANGIOPLASTY WITH STENT PLACEMENT  1999    RCA   • CORONARY ANGIOPLASTY WITH STENT PLACEMENT  2001    RCA   • CORONARY ANGIOPLASTY WITH STENT PLACEMENT  2003    LAD   • EYE SURGERY     • ME BX PROSTATE STRTCTC SATURATION SAMPLING IMG GID N/A 09/13/2022    Procedure: TRANSPERINEAL MRI FUSION  BIOPSY PROSTATE;  Surgeon: Florecita Mcpherson MD;  Location: [facility];  Service: Urology   • ME NEUROPLASTY &/TRANSPOS MEDIAN NRV CARPAL Marshal Gull Left 07/22/2022    Procedure: RELEASE CARPAL TUNNEL;  Surgeon: Dino Watkins MD;  Location: [facility];  Service: Orthopedics   • ME NEUROPLASTY &/TRANSPOSITION ULNAR NERVE ELBOW Left 07/22/2022    Procedure: RELEASE CUBITAL TUNNEL;  Surgeon: Dino Watkins MD;  Location: [facility];  Service: Orthopedics   • GA NEUROPLASTY &/TRANSPOSITION ULNAR NERVE WRIST Left 07/22/2022    Procedure: GUYON'S CANAL RELEASE;  Surgeon: Sisi Alba MD;  Location: [facility];  Service: Orthopedics   • SKIN CANCER EXCISION  2012    chin-per pt, basal cell  also right ankle   • TONSILLECTOMY  no       Meds/Allergies:    Prior to Admission medications    Medication Sig Start Date End Date Taking? Authorizing Provider   albuterol (2 5 mg/3 mL) 0 083 % nebulizer solution Take 3 mL (2 5 mg total) by nebulization every 6 (six) hours as needed for wheezing or shortness of breath 3/7/23  Yes Stevo Stain, CRNP   aspirin 81 mg chewable tablet CHEW ONE TABLET BY MOUTH EVERY DAY   Yes Historical Provider, MD   dextromethorphan-guaiFENesin (ROBITUSSIN DM)  mg/5 mL syrup Take 5 mL by mouth 3 (three) times a day as needed for cough 2/13/23  Yes Manny Steele PA-C   famotidine (PEPCID) 20 mg tablet Take 1 tablet (20 mg total) by mouth daily at bedtime 2/14/23  Yes Winston Fabry, DO   fluticasone (FLONASE) 50 mcg/act nasal spray into each nostril as needed    Yes Historical Provider, MD   Fluticasone-Salmeterol (Wixela Inhub) 500-50 mcg/dose inhaler Inhale 1 puff 2 (two) times a day Rinse mouth after use  3/13/23  Yes Manny Steele PA-C   furosemide (LASIX) 40 mg tablet Take 1 tablet (40 mg total) by mouth daily 1/20/22  Yes Winston Fabry, DO   gabapentin (NEURONTIN) 300 mg capsule Take 1 capsule (300 mg total) by mouth 2 (two) times a day 2/23/23  Yes Eva Form, DO   metoprolol succinate (TOPROL-XL) 25 mg 24 hr tablet Take 0 5 tablets (12 5 mg total) by mouth 2 (two) times a day 2/23/23  Yes Winston Fabry, DO   oxygen gas Inhale 2 L/min continuous   2LPM at rest and 3LPM with activity per D Cedric FANG notes  Indications: SOB, pulmonary edema suspected   Yes Historical Provider, MD   rosuvastatin (CRESTOR) 40 MG tablet TAKE 1 TABLET DAILY  Patient taking differently: Take 40 mg by mouth daily at bedtime 11/21/22  Yes Magali Miller DO   Tiotropium Bromide Monohydrate (SPIRIVA RESPIMAT IN) Inhale every morning   Yes Historical Provider, MD   azithromycin (ZITHROMAX) 500 MG tablet Take 1 tablet (500 mg total) by mouth 3 (three) times a week  Patient not taking: Reported on 3/28/2023 2/15/23 5/16/23  Magali Miller DO   Blood Pressure Monitor KIT Use daily  Patient taking differently: Use 40 mg daily 21   Magali Miller DO   potassium chloride (Klor-Con) 10 mEq tablet Take 2 tablets (20 mEq total) by mouth daily  Patient taking differently: Take 20 mEq by mouth 2 (two) times a day 20 mEq in the am, and 10 mEq at night 22   Magali Miller DO   Ascorbic Acid (vitamin C) 1000 MG tablet Take 1,000 mg by mouth daily  Patient not taking: No sig reported  10/15/22  Historical Provider, MD   methocarbamol (ROBAXIN) 500 mg tablet Take 1 tablet (500 mg total) by mouth 3 (three) times a day as needed for muscle spasms  Patient not taking: No sig reported 4/27/22 10/15/22  Magali Miller DO     I have reviewed home medications with patient personally  Allergies:    Allergies   Allergen Reactions   • Lisinopril Swelling and Cough   • Tetanus Antitoxin Anaphylaxis   • Tetanus Toxoid Anaphylaxis and Swelling       Social History:     Marital Status: /Civil Union   Occupation: Retired  Patient Pre-hospital Living Situation: Home with wife  Patient Pre-hospital Level of Mobility: Independent  Patient Pre-hospital Diet Restrictions: Heart healthy  Substance Use History:   Social History     Substance and Sexual Activity   Alcohol Use Never     Social History     Tobacco Use   Smoking Status Former   • Packs/day: 2 00   • Years: 35 00   • Pack years: 70 00   • Types: Cigarettes   • Start date: 1960   • Quit date: 1995   • Years since quittin 7   Smokeless Tobacco Never   Tobacco Comments    stopped      Social History     Substance and Sexual Activity   Drug Use Never       Family History:    Family "History   Problem Relation Age of Onset   • Heart attack Mother    • Dementia Mother    • Heart failure Mother             • No Known Problems Father        Physical Exam:     Vitals:   Blood Pressure: 114/76 (23)  Pulse: 85 (23)  Temperature: 98 4 °F (36 9 °C) (23)  Temp Source: Oral (23)  Respirations: 20 (23)  Height: 5' 8\" (172 7 cm) (23)  Weight - Scale: 91 kg (200 lb 9 9 oz) (23)  SpO2: 92 % (23 1525)    Physical Exam  Vitals and nursing note reviewed  Constitutional:       Appearance: He is ill-appearing  HENT:      Head: Normocephalic and atraumatic  Mouth/Throat:      Mouth: Mucous membranes are moist       Pharynx: Oropharynx is clear  Eyes:      Pupils: Pupils are equal, round, and reactive to light  Cardiovascular:      Rate and Rhythm: Normal rate and regular rhythm  Pulmonary:      Effort: Pulmonary effort is normal  No respiratory distress  Breath sounds: Decreased breath sounds and wheezing present  Abdominal:      General: Bowel sounds are normal       Palpations: Abdomen is soft  Tenderness: There is no abdominal tenderness  Musculoskeletal:         General: No swelling  Cervical back: Neck supple  Skin:     General: Skin is warm and dry  Capillary Refill: Capillary refill takes less than 2 seconds  Neurological:      General: No focal deficit present  Mental Status: He is alert and oriented to person, place, and time  Additional Data:     Lab Results: I have personally reviewed pertinent reports        Results from last 7 days   Lab Units 23  1331 23  1510   WBC Thousand/uL 15 62* 20 96*   HEMOGLOBIN g/dL 14 5 16 0   HEMATOCRIT % 47 0 50 8*   PLATELETS Thousands/uL 306 451*   BANDS PCT %  --  10*   NEUTROS PCT % 80*  --    LYMPHS PCT % 7*  --    LYMPHO PCT %  --  3*   MONOS PCT % 9  --    MONO PCT %  --  10   EOS PCT % 1 2     Results from " last 7 days   Lab Units 03/28/23  1331   SODIUM mmol/L 138   POTASSIUM mmol/L 4 1   CHLORIDE mmol/L 96   CO2 mmol/L 37*   BUN mg/dL 15   CREATININE mg/dL 1 07   ANION GAP mmol/L 5   CALCIUM mg/dL 9 0   ALBUMIN g/dL 3 5   TOTAL BILIRUBIN mg/dL 0 59   ALK PHOS U/L 60   ALT U/L 20   AST U/L 21   GLUCOSE RANDOM mg/dL 112     Results from last 7 days   Lab Units 03/28/23  1358   INR  1 10             Results from last 7 days   Lab Units 03/28/23  1358   LACTIC ACID mmol/L 1 3   PROCALCITONIN ng/ml 0 12       Imaging: I have personally reviewed pertinent reports  XR chest 1 view portable   ED Interpretation by Sheyla Jimenez DO (03/28 1415)   Right basilar pneumonia, improved from previous image but still present  Possible left basilar pneumonia as well          EKG, Pathology, and Other Studies Reviewed on Admission:   · EKG: NSR    Allscripts / Epic Records Reviewed: Yes     ** Please Note: This note has been constructed using a voice recognition system   **

## 2023-03-29 ENCOUNTER — HOME CARE VISIT (OUTPATIENT)
Dept: HOME HEALTH SERVICES | Facility: HOME HEALTHCARE | Age: 78
End: 2023-03-29

## 2023-03-29 LAB
ANION GAP SERPL CALCULATED.3IONS-SCNC: 5 MMOL/L (ref 4–13)
BUN SERPL-MCNC: 16 MG/DL (ref 5–25)
CALCIUM SERPL-MCNC: 8.8 MG/DL (ref 8.4–10.2)
CHLORIDE SERPL-SCNC: 101 MMOL/L (ref 96–108)
CO2 SERPL-SCNC: 32 MMOL/L (ref 21–32)
CREAT SERPL-MCNC: 0.94 MG/DL (ref 0.6–1.3)
ERYTHROCYTE [DISTWIDTH] IN BLOOD BY AUTOMATED COUNT: 13.7 % (ref 11.6–15.1)
GFR SERPL CREATININE-BSD FRML MDRD: 77 ML/MIN/1.73SQ M
GLUCOSE SERPL-MCNC: 143 MG/DL (ref 65–140)
HCT VFR BLD AUTO: 43.1 % (ref 36.5–49.3)
HGB BLD-MCNC: 13.5 G/DL (ref 12–17)
MCH RBC QN AUTO: 32.1 PG (ref 26.8–34.3)
MCHC RBC AUTO-ENTMCNC: 31.3 G/DL (ref 31.4–37.4)
MCV RBC AUTO: 102 FL (ref 82–98)
PLATELET # BLD AUTO: 297 THOUSANDS/UL (ref 149–390)
PMV BLD AUTO: 10.1 FL (ref 8.9–12.7)
POTASSIUM SERPL-SCNC: 4.6 MMOL/L (ref 3.5–5.3)
PROCALCITONIN SERPL-MCNC: 0.09 NG/ML
RBC # BLD AUTO: 4.21 MILLION/UL (ref 3.88–5.62)
SODIUM SERPL-SCNC: 138 MMOL/L (ref 135–147)
WBC # BLD AUTO: 15.73 THOUSAND/UL (ref 4.31–10.16)

## 2023-03-29 PROCEDURE — 94640 AIRWAY INHALATION TREATMENT: CPT

## 2023-03-29 PROCEDURE — 94664 DEMO&/EVAL PT USE INHALER: CPT

## 2023-03-29 PROCEDURE — 94760 N-INVAS EAR/PLS OXIMETRY 1: CPT

## 2023-03-29 PROCEDURE — 99233 SBSQ HOSP IP/OBS HIGH 50: CPT | Performed by: INTERNAL MEDICINE

## 2023-03-29 PROCEDURE — 80048 BASIC METABOLIC PNL TOTAL CA: CPT | Performed by: NURSE PRACTITIONER

## 2023-03-29 PROCEDURE — 84145 PROCALCITONIN (PCT): CPT | Performed by: NURSE PRACTITIONER

## 2023-03-29 PROCEDURE — 85027 COMPLETE CBC AUTOMATED: CPT | Performed by: NURSE PRACTITIONER

## 2023-03-29 RX ORDER — FLUTICASONE PROPIONATE AND SALMETEROL 500; 50 UG/1; UG/1
1 POWDER RESPIRATORY (INHALATION) 2 TIMES DAILY
Status: DISCONTINUED | OUTPATIENT
Start: 2023-03-29 | End: 2023-03-30

## 2023-03-29 RX ORDER — LEVALBUTEROL INHALATION SOLUTION 1.25 MG/3ML
1.25 SOLUTION RESPIRATORY (INHALATION)
Status: DISCONTINUED | OUTPATIENT
Start: 2023-03-29 | End: 2023-03-30

## 2023-03-29 RX ORDER — GUAIFENESIN/DEXTROMETHORPHAN 100-10MG/5
10 SYRUP ORAL 3 TIMES DAILY PRN
Status: DISCONTINUED | OUTPATIENT
Start: 2023-03-29 | End: 2023-03-31 | Stop reason: HOSPADM

## 2023-03-29 RX ADMIN — GUAIFENESIN AND DEXTROMETHORPHAN 5 ML: 100; 10 SYRUP ORAL at 02:13

## 2023-03-29 RX ADMIN — FLUTICASONE PROPIONATE AND SALMETEROL 1 PUFF: 500; 50 POWDER RESPIRATORY (INHALATION) at 22:19

## 2023-03-29 RX ADMIN — METHYLPREDNISOLONE SODIUM SUCCINATE 40 MG: 40 INJECTION, POWDER, FOR SOLUTION INTRAMUSCULAR; INTRAVENOUS at 09:04

## 2023-03-29 RX ADMIN — GABAPENTIN 300 MG: 300 CAPSULE ORAL at 17:36

## 2023-03-29 RX ADMIN — ASPIRIN 81 MG 81 MG: 81 TABLET ORAL at 22:17

## 2023-03-29 RX ADMIN — GUAIFENESIN AND DEXTROMETHORPHAN 5 ML: 100; 10 SYRUP ORAL at 09:04

## 2023-03-29 RX ADMIN — POTASSIUM CHLORIDE 20 MEQ: 1500 TABLET, EXTENDED RELEASE ORAL at 22:17

## 2023-03-29 RX ADMIN — LEVALBUTEROL HYDROCHLORIDE 1.25 MG: 1.25 SOLUTION RESPIRATORY (INHALATION) at 19:15

## 2023-03-29 RX ADMIN — LEVALBUTEROL HYDROCHLORIDE 1.25 MG: 1.25 SOLUTION RESPIRATORY (INHALATION) at 16:49

## 2023-03-29 RX ADMIN — ENOXAPARIN SODIUM 40 MG: 100 INJECTION SUBCUTANEOUS at 09:05

## 2023-03-29 RX ADMIN — FLUTICASONE FUROATE AND VILANTEROL TRIFENATATE 1 PUFF: 200; 25 POWDER RESPIRATORY (INHALATION) at 09:05

## 2023-03-29 RX ADMIN — GABAPENTIN 300 MG: 300 CAPSULE ORAL at 09:05

## 2023-03-29 RX ADMIN — METHYLPREDNISOLONE SODIUM SUCCINATE 40 MG: 40 INJECTION, POWDER, FOR SOLUTION INTRAMUSCULAR; INTRAVENOUS at 22:18

## 2023-03-29 RX ADMIN — TIOTROPIUM BROMIDE 18 MCG: 18 CAPSULE ORAL; RESPIRATORY (INHALATION) at 09:05

## 2023-03-29 RX ADMIN — ATORVASTATIN CALCIUM 80 MG: 80 TABLET, FILM COATED ORAL at 16:43

## 2023-03-29 RX ADMIN — METOPROLOL SUCCINATE 12.5 MG: 25 TABLET, EXTENDED RELEASE ORAL at 09:05

## 2023-03-29 RX ADMIN — METOPROLOL SUCCINATE 12.5 MG: 25 TABLET, EXTENDED RELEASE ORAL at 22:17

## 2023-03-29 RX ADMIN — CEFTRIAXONE 2000 MG: 2 INJECTION, SOLUTION INTRAVENOUS at 14:00

## 2023-03-29 RX ADMIN — FUROSEMIDE 40 MG: 40 TABLET ORAL at 09:05

## 2023-03-29 NOTE — ASSESSMENT & PLAN NOTE
· Presented for evaluation of increased shortness of breath and hypoxia  · Notes that he was previously using 2 L nasal cannula as needed during the day but has recently been requiring up to 4 L nasal cannula continuously  · Notes recent RSV, COVID, and bacterial pneumonia  Additionally notes collapsed lung due to her procedure and therefore has been hospitalized approximately 5 times for breathing issues  · CXR (3/28): Severe emphysema with persistent bibasilar pneumonia, greater on the right     · Continue Advair, Solu-Medrol, and Spiriva  · Begin Xopenex nebs 3 times daily  · Further management as below

## 2023-03-29 NOTE — ASSESSMENT & PLAN NOTE
· Unclear if the patient has active/recurrent pneumonia or if imaging is lagging  · Recent hospitalizations for RSV, COVID, and bacterial pneumonia  · Afebrile; he does have leukocytosis but has been intermittently on steroids due to COPD exacerbation and has negative procalcitonin  · Chest x-ray as above  · We will continue Rocephin 2 g every 24 hours at this time  · Follow-up a m  procalcitonin  · Appreciate pulmonology evaluation and further recommendations on this matter

## 2023-03-29 NOTE — CASE MANAGEMENT
Case Management Assessment & Discharge Planning Note    Patient name Patrick Cotter  Location /-87 MRN [id number]  : 1945 Date 3/29/2023       Current Admission Date: 3/28/2023  Current Admission Diagnosis:Acute on chronic respiratory failure with hypoxia [facility])   Patient Active Problem List    Diagnosis Date Noted   • Orthopnea 2023   • Lower extremity edema 2023   • Irregular heart rhythm 2023   • Pneumonia 03/15/2023   • Pneumothorax on right 2023   • Leukocytosis 2023   • Left leg pain 2023   • Transient right leg weakness 2023   • Hypokalemia 2023   • COVID 2023   • Ambulatory dysfunction 2023   • COPD (chronic obstructive pulmonary disease) ([facility] ) 2023   • Right lower quadrant abdominal pain 10/23/2022   • RSV (respiratory syncytial virus infection) 10/21/2022   • Obstructive sleep apnea syndrome in adult 10/19/2022   • Sensorineural hearing loss, bilateral 10/19/2022   • Acute on chronic respiratory failure with hypoxia ([address] ) 10/15/2022   • Prostate cancer ([facility] ) 2022   • Elevated MCV 2022   • Cubital tunnel syndrome on left 2022   • Carpal tunnel syndrome on left 2022   • Intercostal neuralgia 2022   • Urinary frequency 2022   • Incomplete bladder emptying 2022   • Chronic bilateral low back pain with right-sided sciatica 2022   • Mid back pain 2022   • Thoracic radiculopathy 2022   • Chronic pain syndrome 2022   • Stage 3a chronic kidney disease ([address] ) 2022   • Hoarseness or changing voice 2022   • Chronic right-sided thoracic back pain 2022   • Primary insomnia 2022   • Right hip pain 2022   • Abnormal nuclear stress test 2021   • Chest pain 2021   • Costochondral chest pain 01/15/2021   • COPD with exacerbation ([facility] ) 2021   • Oxygen dependent 2021   • S/P carotid endarterectomy 2021   • Stented coronary artery 01/11/2021   • Vertigo 01/11/2021   • Anisometropia 01/11/2021   • Osteoarthritis of spinal facet joint 01/11/2021   • Chronic ischemic heart disease 01/11/2021   • Chronic diastolic (congestive) heart failure ([facility] ) 01/11/2021   • Diverticular disease of colon 01/11/2021   • Dry eye syndrome 01/11/2021   • Gastroesophageal reflux disease 01/11/2021   • Hearing loss 01/11/2021   • Elevated PSA 01/11/2021   • Hypoxia 01/11/2021   • Lens replaced by other means 01/11/2021   • Lumbar radiculopathy 01/11/2021   • Multinodular goiter 01/11/2021   • Nuclear senile cataract 01/11/2021   • Obesity 01/11/2021   • Occlusion and stenosis of carotid artery 01/11/2021   • Osteoarthritis of hip 01/11/2021   • Prediabetes 01/11/2021   • Psychosexual dysfunction with inhibited sexual excitement 01/11/2021   • LAMBERTO on CPAP 01/11/2021   • Solitary pulmonary nodule 01/11/2021   • Thyroid nodule 01/11/2021   • Guyon syndrome, 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   • Depression, recurrent ([facility] )    • Hyperlipidemia    • Coronary artery disease involving native coronary artery of native heart without angina pectoris    • Left carotid artery occlusion    • Hypertension       LOS (days): 1  Geometric Mean LOS (GMLOS) (days): 4 00  Days to GMLOS:3 2     OBJECTIVE:  PATIENT READMITTED TO HOSPITAL  Risk of Unplanned Readmission Score: 33 78         Current admission status: Inpatient  Referral Reason: Other (Discharge planning)    Preferred Pharmacy:   [address] [place] [address] [address]  [address]   [place] [address]  Phone: [phone] Fax: [phone]    [address] Delivery ([facility] ) - Samantha Doty [address]  Phone: [phone] Fax: [phone]    Primary Care Provider: Lennie Benitez DO    Primary Insurance: [facility] REP  Secondary Insurance:     ASSESSMENT:  [address] [employer]     Arvind Lunas Health Care Representative - Spouse   Primary Phone: [phone] (Mobile)               Advance Directives  Does patient have a [address] [facility] [address]?: Yes  Does patient have Advance Directives?: Yes  Advance Directives: Power of  for health care  Primary Contact: Suresh Dumont - spouse         Readmission Root Cause  30 Day Readmission: No    Patient Information  Admitted from[de-identified] Home  Mental Status: Alert  During Assessment patient was accompanied by: Not accompanied during assessment  Assessment information provided by[de-identified] Patient, Spouse  Primary Caregiver: Other (Comment)  Caregiver's Name[de-identified] family, wife and daughter  Humberto Patterson Relationship to Patient[de-identified] Family Member  Caregiver's Telephone Number[de-identified] [phone]  Support Systems: Spouse/significant other, Daughter, Family members  [place] of Residence: [address] [place] [address] Avenue do you live in?: [employer] entry access options   Select all that apply : Stairs  Number of steps to enter home : 3  Do the steps have railings?: Yes  Type of Current Residence: 2 story home  Upon entering residence, is there a bedroom on the main floor (no further steps)?: Yes  Upon entering residence, is there a bathroom on the main floor (no further steps)?: Yes  In the last 12 months, was there a time when you were not able to pay the mortgage or rent on time?: No  In the last 12 months, how many places have you lived?: 1  In the last 12 months, was there a time when you did not have a steady place to sleep or slept in a shelter (including now)?: No  Homeless/housing insecurity resource given?: N/A  Living Arrangements: Lives w/ Spouse/significant other, Lives w/ Daughter  Is patient a ?: Yes  Is patient active with [facility])?: No    Activities of Daily Living Prior to Admission  Functional Status: Assistance  Completes ADLs independently?: No  Level of ADL dependence: Assistance  Ambulates independently?: No  Level of ambulatory dependence: Assistance  Does patient use assisted devices?: Yes  Does patient currently own DME?: Yes  What DME does the patient currently own?: Straight Cane, Walker, Nebulizer, CPAP, Portable Oxygen concentrator, Home Oxygen concentrator  Does patient have a history of Outpatient Therapy (PT/OT)?: Yes  Does the patient have a history of Short-Term Rehab?: Yes  Does patient have a history of HHC?: Yes ([facility])  Does patient currently have [facility]?: No         Patient Information Continued  Income Source: Pension/FCI  Does patient have prescription coverage?: Yes  Within the past 12 months, you worried that your food would run out before you got the money to buy more : Never true  Within the past 12 months, the food you bought just didn't last and you didn't have money to get more : Never true  Food insecurity resource given?: N/A  Does patient receive dialysis treatments?: No  Does patient have a history of substance abuse?: No  Does patient have a history of Mental Health Diagnosis?: No         Means of Transportation  Means of Transport to Appts[de-identified] Family transport  In the past 12 months, has lack of transportation kept you from medical appointments or from getting medications?: No  In the past 12 months, has lack of transportation kept you from meetings, work, or from getting things needed for daily living?: No  Was application for public transport provided?: N/A        DISCHARGE DETAILS:    Discharge planning discussed with[de-identified] Patient, spouse  Freedom of Choice: Yes                   Contacts  Patient Contacts: Angel Sawyer, Spouse Brittany  Relationship to Patient[de-identified] Family  Contact Method: Phone, In Person  Phone Number: [phone]  Reason/Outcome: Discharge [address]         Is the patient interested in [facility] at discharge?: No    DME Referral Provided  Referral made for DME?: No         Would you like to participate in our [address] service program?  : No - Declined    Treatment Team Recommendation: Home  Discharge Destination Plan[de-identified] Home  Transport at Discharge : Family

## 2023-03-29 NOTE — PLAN OF CARE
Problem: Potential for Falls  Goal: Patient will remain free of falls  Description: INTERVENTIONS:  - Educate patient/family on patient safety including physical limitations  - Instruct patient to call for assistance with activity   - Consult OT/PT to assist with strengthening/mobility   - Keep Call bell within reach  - Keep bed low and locked with side rails adjusted as appropriate  - Keep care items and personal belongings within reach  - Initiate and maintain comfort rounds  - Make Fall Risk Sign visible to staff  - Offer Toileting every 2 Hours, in advance of need  - Initiate/Maintain bed/chair alarm  - Obtain necessary fall risk management equipment: alarms, yellow socks and bracelet  - Apply yellow socks and bracelet for high fall risk patients  - Consider moving patient to room near nurses station  Outcome: Progressing     Problem: PAIN - ADULT  Goal: Verbalizes/displays adequate comfort level or baseline comfort level  Description: Interventions:  - Encourage patient to monitor pain and request assistance  - Assess pain using appropriate pain scale  - Administer analgesics based on type and severity of pain and evaluate response  - Implement non-pharmacological measures as appropriate and evaluate response  - Consider cultural and social influences on pain and pain management  - Notify physician/advanced practitioner if interventions unsuccessful or patient reports new pain  Outcome: Progressing     Problem: INFECTION - ADULT  Goal: Absence or prevention of progression during hospitalization  Description: INTERVENTIONS:  - Assess and monitor for signs and symptoms of infection  - Monitor lab/diagnostic results  - Monitor all insertion sites  - Monitor endotracheal if appropriate and nasal secretions for changes in amount and color  - [place] appropriate cooling/warming therapies per order  - Administer medications as ordered  - Instruct and encourage patient and family to use good hand hygiene technique  - Identify and instruct in appropriate isolation precautions for identified infection/condition  Outcome: Progressing     Problem: SAFETY ADULT  Goal: Patient will remain free of falls  Description: INTERVENTIONS:  - Educate patient/family on patient safety including physical limitations  - Instruct patient to call for assistance with activity   - Consult OT/PT to assist with strengthening/mobility   - Keep Call bell within reach  - Keep bed low and locked with side rails adjusted as appropriate  - Keep care items and personal belongings within reach  - Initiate and maintain comfort rounds  - Make Fall Risk Sign visible to staff  - Offer Toileting every 2 Hours, in advance of need  - Initiate/Maintain bed/chair alarm  - Obtain necessary fall risk management equipment: alarms, yellow socks and bracelet  - Apply yellow socks and bracelet for high fall risk patients  - Consider moving patient to room near nurses station  Outcome: Progressing  Goal: Maintain or return to baseline ADL function  Description: INTERVENTIONS:  -  Assess patient's ability to carry out ADLs; assess patient's baseline for ADL function and identify physical deficits which impact ability to perform ADLs (bathing, care of mouth/teeth, toileting, grooming, dressing, etc )  - Assess/evaluate cause of self-care deficits   - Assess range of motion  - Assess patient's mobility; develop plan if impaired  - Assess patient's need for assistive devices and provide as appropriate  - Encourage maximum independence but intervene and supervise when necessary  - Involve family in performance of ADLs  - Assess for home care needs following discharge   - Consider OT consult to assist with ADL evaluation and planning for discharge  - Provide patient education as appropriate  Outcome: Progressing  Goal: Maintains/Returns to pre admission functional level  Description: INTERVENTIONS:  - Perform BMAT or MOVE assessment daily    - Set and communicate daily mobility goal to care team and patient/family/caregiver  - Collaborate with rehabilitation services on mobility goals if consulted  - Perform Range of Motion 3 times a day  - Reposition patient every 2 hours  - Dangle patient 3 times a day  - Stand patient 3 times a day  - Ambulate patient 3 times a day  - Out of bed to chair 3 times a day   - Out of bed for meals 3 times a day  - Out of bed for toileting  - Record patient progress and toleration of activity level   Outcome: Progressing     Problem: DISCHARGE PLANNING  Goal: Discharge to home or other facility with appropriate resources  Description: INTERVENTIONS:  - Identify barriers to discharge w/patient and caregiver  - Arrange for needed discharge resources and transportation as appropriate  - Identify discharge learning needs (meds, wound care, etc )  - Refer to Case Management Department for coordinating discharge planning if the patient needs post-hospital services based on physician/advanced practitioner order or complex needs related to functional status, cognitive ability, or social support system  Outcome: Progressing     Problem: Knowledge Deficit  Goal: Patient/family/caregiver demonstrates understanding of disease process, treatment plan, medications, and discharge instructions  Description: Complete learning assessment and assess knowledge base    Interventions:  - Provide teaching at level of understanding  - Provide teaching via preferred learning methods  Outcome: Progressing     Problem: MOBILITY - ADULT  Goal: Maintain or return to baseline ADL function  Description: INTERVENTIONS:  -  Assess patient's ability to carry out ADLs; assess patient's baseline for ADL function and identify physical deficits which impact ability to perform ADLs (bathing, care of mouth/teeth, toileting, grooming, dressing, etc )  - Assess/evaluate cause of self-care deficits   - Assess range of motion  - Assess patient's mobility; develop plan if impaired  - Assess patient's need for assistive devices and provide as appropriate  - Encourage maximum independence but intervene and supervise when necessary  - Involve family in performance of ADLs  - Assess for home care needs following discharge   - Consider OT consult to assist with ADL evaluation and planning for discharge  - Provide patient education as appropriate  Outcome: Progressing  Goal: Maintains/Returns to pre admission functional level  Description: INTERVENTIONS:  - Perform BMAT or MOVE assessment daily    - Set and communicate daily mobility goal to care team and patient/family/caregiver  - Collaborate with rehabilitation services on mobility goals if consulted  - Perform Range of Motion 3 times a day  - Reposition patient every 2 hours    - Dangle patient 3 times a day  - Stand patient 3 times a day  - Ambulate patient 3 times a day  - Out of bed to chair 3 times a day   - Out of bed for meals 3 times a day  - Out of bed for toileting  - Record patient progress and toleration of activity level   Outcome: Progressing     Problem: RESPIRATORY - ADULT  Goal: Achieves optimal ventilation and oxygenation  Description: INTERVENTIONS:  - Assess for changes in respiratory status  - Assess for changes in mentation and behavior  - Position to facilitate oxygenation and minimize respiratory effort  - Oxygen administered by appropriate delivery if ordered  - Initiate smoking cessation education as indicated  - Encourage broncho-pulmonary hygiene including cough, deep breathe, Incentive Spirometry  - Assess the need for suctioning and aspirate as needed  - Assess and instruct to report SOB or any respiratory difficulty  - Respiratory Therapy support as indicated  Outcome: Progressing

## 2023-03-29 NOTE — PLAN OF CARE

## 2023-03-29 NOTE — NURSING NOTE
PT AGITATED, C/O POOR SERVICE, PROVIDED SUPPORT AND EZEQUIEL COMFORTABLE, REFUSED CARDIAC DIET, DEMANDING REGULAR  REFUSES 4L NC, REDUCED TO 3L SAO2 83 REFUSES ADJUSTEMENT TO 4L, HOSPITALIST MADE AWARE

## 2023-03-29 NOTE — ASSESSMENT & PLAN NOTE
· No significant wheezing on examination this morning  · Notes wheezing and increased shortness of breath with exertion  · Follows outpatient with pulmonology  · Continue Advair, Solu-Medrol, and Spiriva as above  · Begin Xopenex as above  · Pulmonology consultation requested to aid in respiratory optimization

## 2023-03-29 NOTE — UTILIZATION REVIEW
Initial Clinical Review    Admission: Date/Time/Statement:   Admission Orders (From admission, onward)     Ordered        03/28/23 1515  INPATIENT ADMISSION  Once                      Orders Placed This Encounter   Procedures   • INPATIENT ADMISSION     Standing Status:   Standing     Number of Occurrences:   1     Order Specific Question:   Level of Care     Answer:   Med Surg [16]     Order Specific Question:   Estimated length of stay     Answer:   More than 2 Midnights     Order Specific Question:   Certification     Answer:   I certify that inpatient services are medically necessary for this patient for a duration of greater than two midnights  See H&P and MD Progress Notes for additional information about the patient's course of treatment  ED Arrival Information     Expected   -    Arrival   3/28/2023 12:39    Acuity   Urgent            Means of arrival   Walk-In    Escorted by   Family Member    Service   Hospitalist    Admission type   Emergency            Arrival complaint   sob           Chief Complaint   Patient presents with   • Shortness of Breath     Pt reports sob for three days  Pt has been requiring more oxygen at home  Initial Presentation: 65 y o  male with PMH of CHF, CKD stage 3, COPD, emphysema, HLD, HTN, MI, prostate cancer who presents with shortness of breath and dyspnea on exertion  Requires oxygen 2 L nasal cannula at baseline, but he has been requiring up to 4 L  He says that this has occurred since he was infected with COVID in January 2023  His wife was also infected with COVID and is currently a patient in the ICU for similar issues  Plan: Inpatient admission for evaluation and treatment of acute on chronic resp failure with hypoxia, COPD with exacerbation, pneumonia, CHF, CAD: now utilizing 5L NC-wean as tolerated, IV solu medrol 40 mg bid, Pulmonology consult, IV ceftriaxone, continue home lasix, ASA and statin       Date: 3/29   Day 2:     Internal medicine: Continue Advair, Solu-Medrol, and Spiriva  Begin Xopenex nebs 3 times daily  We will continue IV Rocephin 2 g every 24 hours at this time  Consult Pulmonology  3/30:    Pulmonology consult: continue IV solu medrol 40 mg q 12 hrs  Switched to nebulized only treatment budesonide every 12, Atrovent/Xopenex 3 times daily  Continue antibiotics to complete 5 days    ED Triage Vitals [03/28/23 1314]   Temperature Pulse Respirations Blood Pressure SpO2   (!) 97 3 °F (36 3 °C) 88 20 110/51 (!) 83 %      Temp Source Heart Rate Source Patient Position - Orthostatic VS BP Location FiO2 (%)   Tympanic Monitor Sitting Left arm --      Pain Score       No Pain          Wt Readings from Last 1 Encounters:   03/28/23 91 kg (200 lb 9 9 oz)     Additional Vital Signs:     Date/Time Temp Pulse Resp BP MAP (mmHg) SpO2 Calculated FIO2 (%) - Nasal Cannula Nasal Cannula O2 Flow Rate (L/min) O2 Device   03/29/23 1023 -- -- -- -- -- 92 % 36 4 L/min --   03/29/23 0923 -- -- -- -- -- 88 % Abnormal  32 3 L/min --   03/29/23 08:02:51 98 4 °F (36 9 °C) 75 22 135/75 95 90 % 36 4 L/min CPAP   03/29/23 0754 -- -- -- -- -- 92 % 36 4 L/min CPAP   03/28/23 2220 -- -- -- -- -- -- 36 4 L/min Nasal cannula   03/28/23 22:19:44 97 9 °F (36 6 °C) 104 18 118/69 85 90 % -- -- --   03/28/23 1701 98 4 °F (36 9 °C) 85 20 114/76 89 -- -- -- Nasal cannula   03/28/23 16:52:25 98 4 °F (36 9 °C) -- 18 114/76 89 -- -- -- --   03/28/23 1525 -- 85 20 134/61 -- 92 % 36 4 L/min Nasal cannula   03/28/23 1402 -- -- -- -- -- -- -- -- Nasal cannula   03/28/23 1321 -- -- -- -- -- 88 % Abnormal  44 6 L/min Nasal cannula     Pertinent Labs/Diagnostic Test Results:   XR chest 1 view portable   ED Interpretation by Axel Block DO (03/28 1415)   Right basilar pneumonia, improved from previous image but still present    Possible left basilar pneumonia as well      Final Result by Manuel Stewart MD (03/28 2019)      Severe emphysema with persistent bibasilar pneumonia, greater on the right  Workstation performed: [id number]           Results from last 7 days   Lab Units 03/28/23  1358   SARS-COV-2  Negative     Lab Units 03/29/23  0510 03/28/23  1331   WBC Thousand/uL 15 73* 15 62*   HEMOGLOBIN g/dL 13 5 14 5   HEMATOCRIT % 43 1 47 0   PLATELETS Thousands/uL 297 306   NEUTROS ABS Thousands/µL  --  12 80*   BANDS PCT %  --   --          Lab Units 03/29/23  0510 03/28/23  1331   SODIUM mmol/L 138 138   POTASSIUM mmol/L 4 6 4 1   CHLORIDE mmol/L 101 96   CO2 mmol/L 32 37*   ANION GAP mmol/L 5 5   BUN mg/dL 16 15   CREATININE mg/dL 0 94 1 07   EGFR ml/min/1 73sq m 77 66   CALCIUM mg/dL 8 8 9 0     Lab Units 03/28/23  1331   AST U/L 21   ALT U/L 20   ALK PHOS U/L 60   TOTAL PROTEIN g/dL 6 1*   ALBUMIN g/dL 3 5   TOTAL BILIRUBIN mg/dL 0 59         Results from last 7 days   Lab Units 03/29/23  0510 03/28/23  1331   GLUCOSE RANDOM mg/dL 143* 112         Results from last 7 days   Lab Units 03/28/23  1719 03/28/23  1529 03/28/23  1331   HS TNI 0HR ng/L  --   --  23   HS TNI 2HR ng/L  --  21  --    HSTNI D2 ng/L  --  -2  --    HS TNI 4HR ng/L 19  --   --    HSTNI D4 ng/L -4  --   --          Results from last 7 days   Lab Units 03/28/23  1358   PROTIME seconds 14 2   INR  1 10   PTT seconds 26         Results from last 7 days   Lab Units 03/29/23  0510 03/28/23  1358   PROCALCITONIN ng/ml 0 09 0 12     Results from last 7 days   Lab Units 03/28/23  1358   LACTIC ACID mmol/L 1 3           Results from last 7 days   Lab Units 03/28/23  1358   INFLUENZA A PCR  Negative   INFLUENZA B PCR  Negative   RSV PCR  Negative           Results from last 7 days   Lab Units 03/28/23  1400 03/28/23  1358   BLOOD CULTURE  Received in Microbiology Lab  Culture in Progress  Received in Microbiology Lab  Culture in Progress           ED Treatment:   Medication Administration from 03/28/2023 1239 to 03/28/2023 1555       Date/Time Order Dose Route Action     03/28/2023 1429 EDT cefTRIAXone (ROCEPHIN) IVPB (premix in dextrose) 2,000 mg 50 mL 2,000 mg Intravenous New Bag     03/28/2023 1426 EDT methylPREDNISolone sodium succinate (Solu-MEDROL) injection 40 mg 40 mg Intravenous Given     03/28/2023 1438 EDT ipratropium-albuterol (DUO-NEB) 0 5-2 5 mg/3 mL inhalation solution 3 mL 3 mL Nebulization Given        Past Medical History:   Diagnosis Date   • Bilateral carotid artery stenosis    • Cancer ([facility])     skin   • Chronic diastolic heart failure ([facility])    • Chronic ischemic heart disease    • Chronic kidney disease     stage 3   • Colon polyp    • COPD (chronic obstructive pulmonary disease) ([facility])    • Coronary artery disease     hx stents, MI, PCI   • COVID 11/2021   • CPAP (continuous positive airway pressure) dependence    • Emphysema of lung ([facility] [facility] ) 1995 started   • Hearing loss    • History of transfusion 1995   • Hyperlipidemia    • Hypertension    • Lung nodule 2023    x rays   • MI (myocardial infarction) ([facility] [address] ) 1995   • Myocardial infarction ([facility] [address] ) 1995   • Pneumonia    • Pneumothorax feb 20,2023    collapsed lung   • Prostate cancer ([facility] [address] )    • RSV (respiratory syncytial virus infection) 10/2022   • S/P carotid endarterectomy    • Shortness of breath     O2 2 l/nc PRN   • Sleep apnea    • Sleep apnea, obstructive    • Stented coronary artery      Present on Admission:  • Coronary artery disease involving native coronary artery of native heart without angina pectoris  • Chronic diastolic (congestive) heart failure (HCC)  • COPD with exacerbation ([facility])  • Acute on chronic respiratory failure with hypoxia ([facility])      Admitting Diagnosis: Pneumonia [J18 9]  SOB (shortness of breath) [R06 02]  Hypoxia [R09 02]  COPD exacerbation ([facility]) [J44 1]  Age/Sex: 65 y o  male  Admission Orders:  Scheduled Medications:  aspirin, 81 mg, Oral, Daily  atorvastatin, 80 mg, Oral, Daily With Dinner  cefTRIAXone, 2,000 mg, Intravenous, Q24H  enoxaparin, 40 mg, Subcutaneous, Daily  fluticasone-vilanterol, 1 puff, Inhalation, Daily  furosemide, 40 mg, Oral, Daily  gabapentin, 300 mg, Oral, BID  methylPREDNISolone sodium succinate, 40 mg, Intravenous, Q12H SVETLANA  metoprolol succinate, 12 5 mg, Oral, BID  potassium chloride, 20 mEq, Oral, HS  tiotropium, 18 mcg, Inhalation, Daily      Continuous IV Infusions:     PRN Meds:  acetaminophen, 650 mg, Oral, Q6H PRN  albuterol, 2 5 mg, Nebulization, Q6H PRN  dextromethorphan-guaiFENesin, 10 mL, Oral, TID PRN        IP CONSULT TO PULMONOLOGY  IP CONSULT TO PULMONOLOGY    Network Utilization Review Department  ATTENTION: Please call with any questions or concerns to [phone] and carefully listen to the prompts so that you are directed to the right person  All voicemails are confidential   Esther Lyle all requests for admission clinical reviews, approved or denied determinations and any other requests to dedicated fax number below belonging to the campus where the patient is receiving treatment   List of dedicated fax numbers for the Facilities:  [address] DENIALS (Administrative/Medical Necessity) [phone]   [address] (Maternity/NICU/Pediatrics) [phone]   [address] [phone]   [facility] 77 [phone]   [address] [place] [address] [facility] [address]y [address] 28 [phone]   [address] [facility] 134 815 [facility] [phone]

## 2023-03-29 NOTE — ASSESSMENT & PLAN NOTE
Wt Readings from Last 3 Encounters:   03/28/23 91 kg (200 lb 9 9 oz)   03/23/23 91 2 kg (201 lb)   03/22/23 93 4 kg (206 lb)       · Does not appear acutely volume overloaded at this time  · Continue home Toprol as above and Lasix 40 mg daily

## 2023-03-29 NOTE — PROGRESS NOTES
[facility]  Progress Note  Name: Omer Bishop  MRN: [id number]  Unit/Bed#: -01 I Date of Admission: 3/28/2023   Date of Service: 3/29/2023 I Hospital Day: 1    Assessment/Plan   * Acute on chronic respiratory failure with hypoxia [facility])  Assessment & Plan  · Presented for evaluation of increased shortness of breath and hypoxia  · Notes that he was previously using 2 L nasal cannula as needed during the day but has recently been requiring up to 4 L nasal cannula continuously  · Notes recent RSV, COVID, and bacterial pneumonia  Additionally notes collapsed lung due to her procedure and therefore has been hospitalized approximately 5 times for breathing issues  · CXR (3/28): Severe emphysema with persistent bibasilar pneumonia, greater on the right     · Continue Advair, Solu-Medrol, and Spiriva  · Begin Xopenex nebs 3 times daily  · Further management as below    COPD with exacerbation ([address] )  Assessment & Plan  · No significant wheezing on examination this morning  · Notes wheezing and increased shortness of breath with exertion  · Follows outpatient with pulmonology  · Continue Advair, Solu-Medrol, and Spiriva as above  · Begin Xopenex as above  · Pulmonology consultation requested to aid in respiratory optimization    Pneumonia  Assessment & Plan  · Unclear if the patient has active/recurrent pneumonia or if imaging is lagging  · Recent hospitalizations for RSV, COVID, and bacterial pneumonia  · Afebrile; he does have leukocytosis but has been intermittently on steroids due to COPD exacerbation and has negative procalcitonin  · Chest x-ray as above  · We will continue Rocephin 2 g every 24 hours at this time  · Follow-up a m  procalcitonin  · Appreciate pulmonology evaluation and further recommendations on this matter    Coronary artery disease involving native coronary artery of native heart without angina pectoris  Assessment & Plan  · Continue home aspirin 81 mg daily, metoprolol 12 5 mg twice daily, and atorvastatin 80 mg daily    Chronic diastolic (congestive) heart failure (HCC)  Assessment & Plan  Wt Readings from Last 3 Encounters:   03/28/23 91 kg (200 lb 9 9 oz)   03/23/23 91 2 kg (201 lb)   03/22/23 93 4 kg (206 lb)       · Does not appear acutely volume overloaded at this time  · Continue home Toprol as above and Lasix 40 mg daily             VTE Pharmacologic Prophylaxis: VTE Score: 7 High Risk (Score >/= 5) - Pharmacological DVT Prophylaxis Ordered: enoxaparin (Lovenox)  Sequential Compression Devices Ordered  Patient Centered Rounds: I performed bedside rounds with nursing staff today  Discussions with Specialists or Other Care Team Provider: Pulm, Nursing, and CM    Education and Discussions with Family / Patient: Updated  (daughter) at bedside  Total Time Spent on Date of Encounter in care of patient: 65 minutes This time was spent on one or more of the following: performing physical exam; counseling and coordination of care; obtaining or reviewing history; documenting in the medical record; reviewing/ordering tests, medications or procedures; communicating with other healthcare professionals and discussing with patient's family/caregivers  Current Length of Stay: 1 day(s)  Current Patient Status: Inpatient   Certification Statement: The patient will continue to require additional inpatient hospital stay due to Acute hypoxic respiratory failure and COPD exacerbation  Discharge Plan: To be determined upon clinical improvement  Plan to return home once medically stable  Code Status: Level 1 - Full Code    Subjective:   Patient sitting up in bed with several concerns and extremely frustrated  Patient has severe COPD with multiple hospitalizations for viral and bacterial pneumonia over the last several months    Additionally he notes collapsed lung due to a procedure and since that time has had increasing oxygen requirements and does not feel as if he has ever returned to his baseline  He was recently diagnosed with prostate cancer and notes that he will require surgery but nobody is willing to operate given his current respiratory status  Objective:     Vitals:   Temp (24hrs), Av 3 °F (36 8 °C), Min:97 9 °F (36 6 °C), Max:98 4 °F (36 9 °C)    Temp:  [97 9 °F (36 6 °C)-98 4 °F (36 9 °C)] 98 4 °F (36 9 °C)  HR:  [] 75  Resp:  [18-22] 22  BP: (114-135)/(69-76) 135/75  SpO2:  [88 %-92 %] 92 %  Body mass index is 30 5 kg/m²  Input and Output Summary (last 24 hours): Intake/Output Summary (Last 24 hours) at 3/29/2023 1629  Last data filed at 3/29/2023 1310  Gross per 24 hour   Intake --   Output 700 ml   Net -700 ml       Physical Exam:   Physical Exam  Vitals and nursing note reviewed  Constitutional:       Appearance: Normal appearance  He is obese  HENT:      Head: Normocephalic and atraumatic  Mouth/Throat:      Mouth: Mucous membranes are moist       Pharynx: Oropharynx is clear  Eyes:      Extraocular Movements: Extraocular movements intact  Conjunctiva/sclera: Conjunctivae normal    Cardiovascular:      Rate and Rhythm: Normal rate and regular rhythm  Pulses: Normal pulses  Heart sounds: Normal heart sounds  Pulmonary:      Effort: Pulmonary effort is normal  No respiratory distress  Breath sounds: Normal breath sounds  No wheezing  Comments: 4 L nasal cannula  Abdominal:      General: Bowel sounds are normal  There is no distension  Palpations: Abdomen is soft  Tenderness: There is no abdominal tenderness  Musculoskeletal:         General: Normal range of motion  Cervical back: Normal range of motion and neck supple  Skin:     General: Skin is warm and dry  Neurological:      General: No focal deficit present  Mental Status: He is alert and oriented to person, place, and time  Mental status is at baseline     Psychiatric:         Mood and Affect: Mood normal  Behavior: Behavior normal          Judgment: Judgment normal           Additional Data:     Labs:  Results from last 7 days   Lab Units 03/29/23  0510 03/28/23  1331 03/24/23  1510   WBC Thousand/uL 15 73* 15 62* 20 96*   HEMOGLOBIN g/dL 13 5 14 5 16 0   HEMATOCRIT % 43 1 47 0 50 8*   PLATELETS Thousands/uL 297 306 451*   BANDS PCT %  --   --  10*   NEUTROS PCT %  --  80*  --    LYMPHS PCT %  --  7*  --    LYMPHO PCT %  --   --  3*   MONOS PCT %  --  9  --    MONO PCT %  --   --  10   EOS PCT %  --  1 2     Results from last 7 days   Lab Units 03/29/23  0510 03/28/23  1331   SODIUM mmol/L 138 138   POTASSIUM mmol/L 4 6 4 1   CHLORIDE mmol/L 101 96   CO2 mmol/L 32 37*   BUN mg/dL 16 15   CREATININE mg/dL 0 94 1 07   ANION GAP mmol/L 5 5   CALCIUM mg/dL 8 8 9 0   ALBUMIN g/dL  --  3 5   TOTAL BILIRUBIN mg/dL  --  0 59   ALK PHOS U/L  --  60   ALT U/L  --  20   AST U/L  --  21   GLUCOSE RANDOM mg/dL 143* 112     Results from last 7 days   Lab Units 03/28/23  1358   INR  1 10             Results from last 7 days   Lab Units 03/29/23  0510 03/28/23  1358   LACTIC ACID mmol/L  --  1 3   PROCALCITONIN ng/ml 0 09 0 12       Lines/Drains:  Invasive Devices     Peripheral Intravenous Line  Duration           Peripheral IV 03/28/23 Right Antecubital 1 day              Imaging: Reviewed radiology reports from this admission including: chest xray    Recent Cultures (last 7 days):   Results from last 7 days   Lab Units 03/28/23  1400 03/28/23  1358   BLOOD CULTURE  Received in Microbiology Lab  Culture in Progress  Received in Microbiology Lab  Culture in Progress         Last 24 Hours Medication List:   Current Facility-Administered Medications   Medication Dose Route Frequency Provider Last Rate   • acetaminophen  650 mg Oral Q6H PRN Jose Seeds, CRNP     • aspirin  81 mg Oral Daily Jose Seeds, CRNP     • atorvastatin  80 mg Oral Daily With Sara Martins PA-C     • cefTRIAXone  2,000 mg Intravenous Q24H Shikha Rice INOCENTE Kaplan 2,000 mg (03/29/23 1400)   • dextromethorphan-guaiFENesin  10 mL Oral TID PRN INOCENTE Simms     • enoxaparin  40 mg Subcutaneous Daily INOCENTE Simms     • Fluticasone-Salmeterol  1 puff Inhalation BID Mati Alcantara DO     • furosemide  40 mg Oral Daily INOCENTE Simms     • gabapentin  300 mg Oral BID INOCENTE Simms     • levalbuterol  1 25 mg Nebulization TID [facility], DO     • methylPREDNISolone sodium succinate  40 mg Intravenous Q12H [address] INOCENTE Simms     • metoprolol succinate  12 5 mg Oral BID INOCENTE Simms     • potassium chloride  20 mEq Oral HS Raheem Waterman PA-C     • tiotropium  18 mcg Inhalation Daily INOCENTE Simms          Today, Patient Was Seen By: Mati Alcantara DO    **Please Note: This note may have been constructed using a voice recognition system  **

## 2023-03-30 DIAGNOSIS — R07.9 CHEST PAIN, UNSPECIFIED TYPE: ICD-10-CM

## 2023-03-30 DIAGNOSIS — J42 CHRONIC BRONCHITIS, UNSPECIFIED CHRONIC BRONCHITIS TYPE (HCC): ICD-10-CM

## 2023-03-30 LAB
ANION GAP SERPL CALCULATED.3IONS-SCNC: 6 MMOL/L (ref 4–13)
ATRIAL RATE: 88 BPM
ATRIAL RATE: 89 BPM
BUN SERPL-MCNC: 20 MG/DL (ref 5–25)
CALCIUM SERPL-MCNC: 8.5 MG/DL (ref 8.4–10.2)
CHLORIDE SERPL-SCNC: 102 MMOL/L (ref 96–108)
CO2 SERPL-SCNC: 31 MMOL/L (ref 21–32)
CREAT SERPL-MCNC: 0.94 MG/DL (ref 0.6–1.3)
ERYTHROCYTE [DISTWIDTH] IN BLOOD BY AUTOMATED COUNT: 13.7 % (ref 11.6–15.1)
GFR SERPL CREATININE-BSD FRML MDRD: 77 ML/MIN/1.73SQ M
GLUCOSE SERPL-MCNC: 156 MG/DL (ref 65–140)
HCT VFR BLD AUTO: 44.4 % (ref 36.5–49.3)
HGB BLD-MCNC: 14 G/DL (ref 12–17)
MCH RBC QN AUTO: 31.9 PG (ref 26.8–34.3)
MCHC RBC AUTO-ENTMCNC: 31.5 G/DL (ref 31.4–37.4)
MCV RBC AUTO: 101 FL (ref 82–98)
P AXIS: 71 DEGREES
P AXIS: 78 DEGREES
PLATELET # BLD AUTO: 298 THOUSANDS/UL (ref 149–390)
PMV BLD AUTO: 10 FL (ref 8.9–12.7)
POTASSIUM SERPL-SCNC: 4.4 MMOL/L (ref 3.5–5.3)
PR INTERVAL: 152 MS
PR INTERVAL: 156 MS
PROCALCITONIN SERPL-MCNC: 0.1 NG/ML
QRS AXIS: 59 DEGREES
QRS AXIS: 65 DEGREES
QRSD INTERVAL: 90 MS
QRSD INTERVAL: 92 MS
QT INTERVAL: 376 MS
QT INTERVAL: 386 MS
QTC INTERVAL: 457 MS
QTC INTERVAL: 467 MS
RBC # BLD AUTO: 4.39 MILLION/UL (ref 3.88–5.62)
SODIUM SERPL-SCNC: 139 MMOL/L (ref 135–147)
T WAVE AXIS: 68 DEGREES
T WAVE AXIS: 79 DEGREES
VENTRICULAR RATE: 88 BPM
VENTRICULAR RATE: 89 BPM
WBC # BLD AUTO: 18.17 THOUSAND/UL (ref 4.31–10.16)

## 2023-03-30 PROCEDURE — 94664 DEMO&/EVAL PT USE INHALER: CPT

## 2023-03-30 PROCEDURE — 85027 COMPLETE CBC AUTOMATED: CPT | Performed by: INTERNAL MEDICINE

## 2023-03-30 PROCEDURE — 84145 PROCALCITONIN (PCT): CPT | Performed by: INTERNAL MEDICINE

## 2023-03-30 PROCEDURE — 80048 BASIC METABOLIC PNL TOTAL CA: CPT | Performed by: INTERNAL MEDICINE

## 2023-03-30 PROCEDURE — 99223 1ST HOSP IP/OBS HIGH 75: CPT | Performed by: INTERNAL MEDICINE

## 2023-03-30 PROCEDURE — 99232 SBSQ HOSP IP/OBS MODERATE 35: CPT | Performed by: INTERNAL MEDICINE

## 2023-03-30 PROCEDURE — 94640 AIRWAY INHALATION TREATMENT: CPT

## 2023-03-30 PROCEDURE — 94760 N-INVAS EAR/PLS OXIMETRY 1: CPT

## 2023-03-30 PROCEDURE — 93010 ELECTROCARDIOGRAM REPORT: CPT | Performed by: INTERNAL MEDICINE

## 2023-03-30 RX ORDER — FUROSEMIDE 10 MG/ML
20 INJECTION INTRAMUSCULAR; INTRAVENOUS ONCE
Status: COMPLETED | OUTPATIENT
Start: 2023-03-30 | End: 2023-03-30

## 2023-03-30 RX ORDER — BUDESONIDE 0.5 MG/2ML
0.5 INHALANT ORAL
Status: DISCONTINUED | OUTPATIENT
Start: 2023-03-30 | End: 2023-03-31 | Stop reason: HOSPADM

## 2023-03-30 RX ORDER — METHYLPREDNISOLONE SODIUM SUCCINATE 40 MG/ML
40 INJECTION INTRAMUSCULAR; INTRAVENOUS EVERY 12 HOURS SCHEDULED
Status: COMPLETED | OUTPATIENT
Start: 2023-03-30 | End: 2023-03-30

## 2023-03-30 RX ORDER — LEVALBUTEROL INHALATION SOLUTION 1.25 MG/3ML
1.25 SOLUTION RESPIRATORY (INHALATION)
Status: DISCONTINUED | OUTPATIENT
Start: 2023-03-30 | End: 2023-03-31 | Stop reason: HOSPADM

## 2023-03-30 RX ORDER — PREDNISONE 20 MG/1
40 TABLET ORAL DAILY
Status: DISCONTINUED | OUTPATIENT
Start: 2023-04-01 | End: 2023-03-31 | Stop reason: HOSPADM

## 2023-03-30 RX ORDER — METOPROLOL SUCCINATE 25 MG/1
TABLET, EXTENDED RELEASE ORAL
Qty: 90 TABLET | Refills: 3 | Status: SHIPPED | OUTPATIENT
Start: 2023-03-30 | End: 2023-03-31

## 2023-03-30 RX ADMIN — METHYLPREDNISOLONE SODIUM SUCCINATE 40 MG: 40 INJECTION, POWDER, FOR SOLUTION INTRAMUSCULAR; INTRAVENOUS at 10:38

## 2023-03-30 RX ADMIN — GABAPENTIN 300 MG: 300 CAPSULE ORAL at 08:41

## 2023-03-30 RX ADMIN — METHYLPREDNISOLONE SODIUM SUCCINATE 40 MG: 40 INJECTION, POWDER, FOR SOLUTION INTRAMUSCULAR; INTRAVENOUS at 21:07

## 2023-03-30 RX ADMIN — TIOTROPIUM BROMIDE 18 MCG: 18 CAPSULE ORAL; RESPIRATORY (INHALATION) at 08:44

## 2023-03-30 RX ADMIN — METHYLPREDNISOLONE 24 MG: 16 TABLET ORAL at 06:34

## 2023-03-30 RX ADMIN — FUROSEMIDE 40 MG: 40 TABLET ORAL at 08:41

## 2023-03-30 RX ADMIN — BUDESONIDE 0.5 MG: 0.5 INHALANT ORAL at 19:43

## 2023-03-30 RX ADMIN — IPRATROPIUM BROMIDE 0.5 MG: 0.5 SOLUTION RESPIRATORY (INHALATION) at 13:20

## 2023-03-30 RX ADMIN — POTASSIUM CHLORIDE 20 MEQ: 1500 TABLET, EXTENDED RELEASE ORAL at 21:07

## 2023-03-30 RX ADMIN — GABAPENTIN 300 MG: 300 CAPSULE ORAL at 18:33

## 2023-03-30 RX ADMIN — LEVALBUTEROL HYDROCHLORIDE 1.25 MG: 1.25 SOLUTION RESPIRATORY (INHALATION) at 19:43

## 2023-03-30 RX ADMIN — GUAIFENESIN AND DEXTROMETHORPHAN 10 ML: 100; 10 SYRUP ORAL at 08:41

## 2023-03-30 RX ADMIN — FLUTICASONE PROPIONATE AND SALMETEROL 1 PUFF: 500; 50 POWDER RESPIRATORY (INHALATION) at 08:44

## 2023-03-30 RX ADMIN — IPRATROPIUM BROMIDE 0.5 MG: 0.5 SOLUTION RESPIRATORY (INHALATION) at 19:43

## 2023-03-30 RX ADMIN — METOPROLOL SUCCINATE 12.5 MG: 25 TABLET, EXTENDED RELEASE ORAL at 21:07

## 2023-03-30 RX ADMIN — FUROSEMIDE 20 MG: 10 INJECTION, SOLUTION INTRAMUSCULAR; INTRAVENOUS at 10:37

## 2023-03-30 RX ADMIN — ENOXAPARIN SODIUM 40 MG: 100 INJECTION SUBCUTANEOUS at 08:42

## 2023-03-30 RX ADMIN — LEVALBUTEROL HYDROCHLORIDE 1.25 MG: 1.25 SOLUTION RESPIRATORY (INHALATION) at 13:20

## 2023-03-30 RX ADMIN — CEFTRIAXONE 2000 MG: 2 INJECTION, SOLUTION INTRAVENOUS at 13:59

## 2023-03-30 RX ADMIN — ASPIRIN 81 MG 81 MG: 81 TABLET ORAL at 21:07

## 2023-03-30 RX ADMIN — METOPROLOL SUCCINATE 12.5 MG: 25 TABLET, EXTENDED RELEASE ORAL at 08:41

## 2023-03-30 RX ADMIN — ATORVASTATIN CALCIUM 80 MG: 80 TABLET, FILM COATED ORAL at 16:55

## 2023-03-30 NOTE — PLAN OF CARE
Problem: Potential for Falls  Goal: Patient will remain free of falls  Description: INTERVENTIONS:  - Educate patient/family on patient safety including physical limitations  - Instruct patient to call for assistance with activity   - Consult OT/PT to assist with strengthening/mobility   - Keep Call bell within reach  - Keep bed low and locked with side rails adjusted as appropriate  - Keep care items and personal belongings within reach  - Initiate and maintain comfort rounds  - Make Fall Risk Sign visible to staff  - Offer Toileting every 2 Hours, in advance of need  - Initiate/Maintain bed/chair alarm  - Obtain necessary fall risk management equipment: alarms, yellow socks and bracelet  - Apply yellow socks and bracelet for high fall risk patients  - Consider moving patient to room near nurses station  Outcome: Progressing     Problem: PAIN - ADULT  Goal: Verbalizes/displays adequate comfort level or baseline comfort level  Description: Interventions:  - Encourage patient to monitor pain and request assistance  - Assess pain using appropriate pain scale  - Administer analgesics based on type and severity of pain and evaluate response  - Implement non-pharmacological measures as appropriate and evaluate response  - Consider cultural and social influences on pain and pain management  - Notify physician/advanced practitioner if interventions unsuccessful or patient reports new pain  Outcome: Progressing     Problem: INFECTION - ADULT  Goal: Absence or prevention of progression during hospitalization  Description: INTERVENTIONS:  - Assess and monitor for signs and symptoms of infection  - Monitor lab/diagnostic results  - Monitor all insertion sites  - Monitor endotracheal if appropriate and nasal secretions for changes in amount and color  - [place] appropriate cooling/warming therapies per order  - Administer medications as ordered  - Instruct and encourage patient and family to use good hand hygiene technique  - Identify and instruct in appropriate isolation precautions for identified infection/condition  Outcome: Progressing     Problem: SAFETY ADULT  Goal: Patient will remain free of falls  Description: INTERVENTIONS:  - Educate patient/family on patient safety including physical limitations  - Instruct patient to call for assistance with activity   - Consult OT/PT to assist with strengthening/mobility   - Keep Call bell within reach  - Keep bed low and locked with side rails adjusted as appropriate  - Keep care items and personal belongings within reach  - Initiate and maintain comfort rounds  - Make Fall Risk Sign visible to staff  - Offer Toileting every 2 Hours, in advance of need  - Initiate/Maintain bed/chair alarm  - Obtain necessary fall risk management equipment: alarms, yellow socks and bracelet  - Apply yellow socks and bracelet for high fall risk patients  - Consider moving patient to room near nurses station  Outcome: Progressing  Goal: Maintain or return to baseline ADL function  Description: INTERVENTIONS:  -  Assess patient's ability to carry out ADLs; assess patient's baseline for ADL function and identify physical deficits which impact ability to perform ADLs (bathing, care of mouth/teeth, toileting, grooming, dressing, etc )  - Assess/evaluate cause of self-care deficits   - Assess range of motion  - Assess patient's mobility; develop plan if impaired  - Assess patient's need for assistive devices and provide as appropriate  - Encourage maximum independence but intervene and supervise when necessary  - Involve family in performance of ADLs  - Assess for home care needs following discharge   - Consider OT consult to assist with ADL evaluation and planning for discharge  - Provide patient education as appropriate  Outcome: Progressing  Goal: Maintains/Returns to pre admission functional level  Description: INTERVENTIONS:  - Perform BMAT or MOVE assessment daily    - Set and communicate daily mobility goal to care team and patient/family/caregiver  - Collaborate with rehabilitation services on mobility goals if consulted  - Perform Range of Motion 3 times a day  - Reposition patient every 2 hours  - Dangle patient 3 times a day  - Stand patient 3 times a day  - Ambulate patient 3 times a day  - Out of bed to chair 3 times a day   - Out of bed for meals 3 times a day  - Out of bed for toileting  - Record patient progress and toleration of activity level   Outcome: Progressing     Problem: DISCHARGE PLANNING  Goal: Discharge to home or other facility with appropriate resources  Description: INTERVENTIONS:  - Identify barriers to discharge w/patient and caregiver  - Arrange for needed discharge resources and transportation as appropriate  - Identify discharge learning needs (meds, wound care, etc )  - Refer to Case Management Department for coordinating discharge planning if the patient needs post-hospital services based on physician/advanced practitioner order or complex needs related to functional status, cognitive ability, or social support system  Outcome: Progressing     Problem: Knowledge Deficit  Goal: Patient/family/caregiver demonstrates understanding of disease process, treatment plan, medications, and discharge instructions  Description: Complete learning assessment and assess knowledge base    Interventions:  - Provide teaching at level of understanding  - Provide teaching via preferred learning methods  Outcome: Progressing     Problem: MOBILITY - ADULT  Goal: Maintain or return to baseline ADL function  Description: INTERVENTIONS:  -  Assess patient's ability to carry out ADLs; assess patient's baseline for ADL function and identify physical deficits which impact ability to perform ADLs (bathing, care of mouth/teeth, toileting, grooming, dressing, etc )  - Assess/evaluate cause of self-care deficits   - Assess range of motion  - Assess patient's mobility; develop plan if impaired  - Assess patient's need for assistive devices and provide as appropriate  - Encourage maximum independence but intervene and supervise when necessary  - Involve family in performance of ADLs  - Assess for home care needs following discharge   - Consider OT consult to assist with ADL evaluation and planning for discharge  - Provide patient education as appropriate  Outcome: Progressing  Goal: Maintains/Returns to pre admission functional level  Description: INTERVENTIONS:  - Perform BMAT or MOVE assessment daily    - Set and communicate daily mobility goal to care team and patient/family/caregiver  - Collaborate with rehabilitation services on mobility goals if consulted  - Perform Range of Motion 3 times a day  - Reposition patient every 2 hours    - Dangle patient 3 times a day  - Stand patient 3 times a day  - Ambulate patient 3 times a day  - Out of bed to chair 3 times a day   - Out of bed for meals 3 times a day  - Out of bed for toileting  - Record patient progress and toleration of activity level   Outcome: Progressing     Problem: RESPIRATORY - ADULT  Goal: Achieves optimal ventilation and oxygenation  Description: INTERVENTIONS:  - Assess for changes in respiratory status  - Assess for changes in mentation and behavior  - Position to facilitate oxygenation and minimize respiratory effort  - Oxygen administered by appropriate delivery if ordered  - Initiate smoking cessation education as indicated  - Encourage broncho-pulmonary hygiene including cough, deep breathe, Incentive Spirometry  - Assess the need for suctioning and aspirate as needed  - Assess and instruct to report SOB or any respiratory difficulty  - Respiratory Therapy support as indicated  Outcome: Progressing

## 2023-03-30 NOTE — ASSESSMENT & PLAN NOTE
· Unclear if the patient has active/recurrent pneumonia or if imaging is lagging  · Recent hospitalizations for RSV, COVID, and bacterial pneumonia  · Chest x-ray as above  · Plan to complete 5-day course of antibiotics per pulmonology recommendations

## 2023-03-30 NOTE — CONSULTS
Pulmonary Medicine-Consultation  Clemente Meigs 65 y o  male MRN: [id number]  Unit/Bed#: -01 Encounter: [id number]      Assessment:  1  Acute hypoxic respiratory failure  2  COPD W/exacerbation  3  Abnormal chest x-ray-bibasilar atelectasis/patchy infiltrate seems to be the same from before  4  Chemic cardiomyopathy/diastolic CHF  5  Recent iatrogenic pneumothorax-resolved  6  LAMBERTO    Recommendations/discussion:  • Continue treatment for exacerbation, solumedrol 40 q12 for 1 more day given the wheezing, may switch to oral prednisone after that  • Switched to nebulized only treatment budesonide every 12, Atrovent/Xopenex 3 times daily  • Continue antibiotics to complete 5 days  • Nocturnal CPAP at home settings  • Discussed extensively with the patient about the slowly declining lung function, slowly recovering from each exacerbation that usually does not go back to the first baseline he came with  Also counseled about avoiding sick contact    Physician Requesting Consult: Ngozi Peng,     Reason for Consult / Principal Problem: COPD/exacerbation    HPI:   65 y o  male with a h/o carotid artery stenosis, CKD, ischemic cardiomyopathy, chronic diastolic CHF, CAD, LAMBERTO/CPAP, HTN, COPD/emphysema, former tobacco abuse 70-pack-year quit in 1995, chronic hypoxic respiratory failure 2 LPM at baseline  Recent hospitalization for iatrogenic pneumothorax on the right in the setting of right intercostal nerve block, treated with small bore chest tube/removed on 2/23  Hospitalized 3/28 with dyspnea  Recently had RSV/COVID-19 infection in 1/2023  Chest x-ray showed infiltrates concerning for pneumonia  Admitted to [facility], started on IV antibiotics/steroids  Normal procalcitonin x3, negative flu/RSV/COVID-19 PCR  On my assessment, patient's feels slightly better compared to the admission time  States that current issues started few days prior to admission    Noted more dyspnea, required more supplemental oxygen than baseline to keep SPO2 above 90%  Still with mild/minimally productive cough states that it is chronic  Still not feeling back to baseline yet      Inpatient consult to Pulmonology  Consult performed by: Holmes Goldberg, MD  Consult ordered by: INOCENTE Sinha    Inpatient consult to Pulmonology  Consult performed by: Holmes Goldberg, MD  Consult ordered by: INOCENTE Sinha          Review of Systems  As per hpi, all other systems reviewed and were negative      Studies:    Imaging Studies: I have personally reviewed pertinent films in PACS    EKG, Pathology, and Other Studies: I have personally reviewed pertinent films in PACS    Pulmonary Results (PFTs, PSG): reviewed     Historical Information   Past Medical History:   Diagnosis Date   • Bilateral carotid artery stenosis    • Cancer ([facility] )     skin   • Chronic diastolic heart failure ([facility] )    • Chronic ischemic heart disease    • Chronic kidney disease     stage 3   • Colon polyp    • COPD (chronic obstructive pulmonary disease) ([facility] )    • Coronary artery disease     hx stents, MI, PCI   • COVID 11/2021   • CPAP (continuous positive airway pressure) dependence    • Emphysema of lung ([facility] ) 1995 started   • Hearing loss    • History of transfusion 1995   • Hyperlipidemia    • Hypertension    • Lung nodule 2023    x rays   • MI (myocardial infarction) ([facility] ) 1995   • Myocardial infarction ([facility] ) 1995   • Pneumonia    • Pneumothorax feb 20,2023    collapsed lung   • Prostate cancer ([facility] )    • RSV (respiratory syncytial virus infection) 10/2022   • S/P carotid endarterectomy    • Shortness of breath     O2 2 l/nc PRN   • Sleep apnea    • Sleep apnea, obstructive    • Stented coronary artery      Past Surgical History:   Procedure Laterality Date   • APPENDECTOMY     • CARDIAC CATHETERIZATION  03/18/2021    left main with no significant disease, proximal LAD with 10% stenosis at the site of prior stent, left circumflex artery with minimal luminal irregularities mid RCA with 50% stenosis at site of prior stent and PL segment with distal disease supplied by collaterals from the distal circumflex with no significant CAD requiring revascularization at that time     • CATARACT EXTRACTION, BILATERAL Bilateral    • COLONOSCOPY     • CORONARY ANGIOPLASTY WITH STENT PLACEMENT      RCA   • CORONARY ANGIOPLASTY WITH STENT PLACEMENT      RCA   • CORONARY ANGIOPLASTY WITH STENT PLACEMENT      LAD   • EYE SURGERY     • [place] BX PROSTATE STRTCTC SATURATION SAMPLING IMG GID N/A 2022    Procedure: TRANSPERINEAL MRI FUSION  BIOPSY PROSTATE;  Surgeon: Augusta Donahue MD;  Location: [facility];  Service: Urology   • [place] NEUROPLASTY &/TRANSPOS MEDIAN NRV Elinorna Jeromeos Left 2022    Procedure: RELEASE CARPAL TUNNEL;  Surgeon: Kimberly Gusman MD;  Location: [facility];  Service: Orthopedics   • [place] NEUROPLASTY &/TRANSPOSITION ULNAR NERVE ELBOW Left 2022    Procedure: RELEASE CUBITAL TUNNEL;  Surgeon: Kimberly Gusman MD;  Location: [facility];  Service: Orthopedics   • [place] NEUROPLASTY &/TRANSPOSITION ULNAR NERVE WRIST Left 2022    Procedure: Dormayoia Apurva;  Surgeon: Kimberly Gusman MD;  Location: [facility];  Service: Orthopedics   • SKIN CANCER EXCISION  2012    chin-per pt, basal cell  also right ankle   • TONSILLECTOMY  no     Social History   Social History     Substance and Sexual Activity   Alcohol Use Never     Social History     Substance and Sexual Activity   Drug Use Never     Social History     Tobacco Use   Smoking Status Former   • Packs/day: 2 00   • Years: 35 00   • Pack years: 70 00   • Types: Cigarettes   • Start date: 1960   • Quit date: 1995   • Years since quittin 7   Smokeless Tobacco Never   Tobacco Comments    stopped      E-Cigarette/Vaping   • E-Cigarette Use Never User      E-Cigarette/Vaping Substances   • Nicotine No    • THC No    • CBD No    • Flavoring No    • Other "No    • Unknown No          Family History:   Family History   Problem Relation Age of Onset   • Heart attack Mother    • Dementia Mother    • Heart failure Mother             • No Known Problems Father        Meds/Allergies   all current active meds have been reviewed    Allergies   Allergen Reactions   • Lisinopril Swelling and Cough   • Tetanus Antitoxin Anaphylaxis   • Tetanus Toxoid Anaphylaxis and Swelling       Objective   Vitals: Blood pressure 113/61, pulse 68, temperature 97 5 °F (36 4 °C), resp  rate 20, height 5' 8\" (1 727 m), weight 91 kg (200 lb 9 9 oz), SpO2 95 %  ,Body mass index is 30 5 kg/m²  Intake/Output Summary (Last 24 hours) at 3/30/2023 0908  Last data filed at 3/29/2023 1741  Gross per 24 hour   Intake 360 ml   Output 350 ml   Net 10 ml     Invasive Devices     Peripheral Intravenous Line  Duration           Peripheral IV 23 Right Antecubital 1 day                Physical Exam  Body mass index is 30 5 kg/m²  Gen: not in acute distress, obese   Neck/Eyes: supple, PERRL  Ear: normal appearance, no significant hearing impairment  Nose:  normal nasal mucosa, no drainage  Oropharynx: mucosa is moist, no focal lesions or erythema  Salivary glands: soft nontender  Chest: normal respiratory efforts, mild expiratory wheeze, poor air movement  CV: distant heart sounds 1+, edema  Abdomen: soft, non tender  Extremities:  No observed deformity   Skin: unremarkable  Neuro: AAO X3, no focal motor deficit       Lab Results: I have personally reviewed pertinent lab results  None    Portions of the record may have been created with voice recognition software  Occasional wrong word or \"sound a like\" substitutions may have occurred due to the inherent limitations of voice recognition software  Read the chart carefully and recognize, using context, where substitutions have occurred      "

## 2023-03-30 NOTE — ASSESSMENT & PLAN NOTE
· Presented for evaluation of increased shortness of breath and hypoxia  · Notes that he was previously using 2L NC PRN but has recently been requiring up to 4L NC continuously  · CXR (3/28): Severe emphysema with persistent bibasilar pneumonia, greater on the right     · Further management as below

## 2023-03-30 NOTE — ASSESSMENT & PLAN NOTE
· No significant wheezing on examination this morning  · Pulmonology evaluation appreciated:  · Solumedrol 40mg Q12H for one additional day followed by PO Prednisone  · Continue Pulmicort Q12 and Atrovent/Xopenex TID  · Complete 5-day course of PO abx (currently on Rocephin)  · Plan for discharge home tomorrow

## 2023-03-30 NOTE — PLAN OF CARE

## 2023-03-30 NOTE — PROGRESS NOTES
[address]  Progress Note  Name: Marton Cogan  MRN: [id number]  Unit/Bed#: -01 I Date of Admission: 3/28/2023   Date of Service: 3/30/2023 I Hospital Day: 2    Assessment/Plan   * Acute on chronic respiratory failure with hypoxia [facility])  Assessment & Plan  · Presented for evaluation of increased shortness of breath and hypoxia  · Notes that he was previously using 2L NC PRN but has recently been requiring up to 4L NC continuously  · CXR (3/28): Severe emphysema with persistent bibasilar pneumonia, greater on the right  · Further management as below    COPD with exacerbation ([facility] [address] )  Assessment & Plan  · No significant wheezing on examination this morning  · Pulmonology evaluation appreciated:  · Solumedrol 40mg Q12H for one additional day followed by PO Prednisone  · Continue Pulmicort Q12 and Atrovent/Xopenex TID  · Complete 5-day course of PO abx (currently on Rocephin)  · Plan for discharge home tomorrow    Pneumonia  Assessment & Plan  · Unclear if the patient has active/recurrent pneumonia or if imaging is lagging  · Recent hospitalizations for RSV, COVID, and bacterial pneumonia  · Chest x-ray as above  · Plan to complete 5-day course of antibiotics per pulmonology recommendations    Coronary artery disease involving native coronary artery of native heart without angina pectoris  Assessment & Plan  · Continue home aspirin 81 mg daily, metoprolol 12 5 mg twice daily, and atorvastatin 80 mg daily    Chronic diastolic (congestive) heart failure (HCC)  Assessment & Plan  Wt Readings from Last 3 Encounters:   03/28/23 91 kg (200 lb 9 9 oz)   03/23/23 91 2 kg (201 lb)   03/22/23 93 4 kg (206 lb)     · Does not appear acutely volume overloaded at this time  · Continue home Toprol as above and Lasix 40 mg daily           VTE Pharmacologic Prophylaxis: VTE Score: 7 High Risk (Score >/= 5) - Pharmacological DVT Prophylaxis Ordered: enoxaparin (Lovenox)   Sequential Compression Devices Ordered  Patient Centered Rounds: I performed bedside rounds with nursing staff today  Discussions with Specialists or Other Care Team Provider: Pulmonology, Respiratory, Nursing, and CM    Education and Discussions with Family / Patient: Patient declined call to   Patient updated on plan of care  Total Time Spent on Date of Encounter in care of patient: 45 minutes This time was spent on one or more of the following: performing physical exam; counseling and coordination of care; obtaining or reviewing history; documenting in the medical record; reviewing/ordering tests, medications or procedures; communicating with other healthcare professionals and discussing with patient's family/caregivers  Current Length of Stay: 2 day(s)  Current Patient Status: Inpatient   Certification Statement: The patient will continue to require additional inpatient hospital stay due to Acute on chronic hypoxic respiratory failure due to COPD exacerbation and pneumonia  Discharge Plan: Anticipate discharge tomorrow to home  Code Status: Level 1 - Full Code    Subjective:   Patient sitting up comfortably in bed without any acute complaints  He does note some improvement in his breathing since admission  No significant overnight events reported by nursing  Objective:     Vitals:   Temp (24hrs), Av 7 °F (36 5 °C), Min:97 5 °F (36 4 °C), Max:97 9 °F (36 6 °C)    Temp:  [97 5 °F (36 4 °C)-97 9 °F (36 6 °C)] 97 5 °F (36 4 °C)  HR:  [68-84] 68  Resp:  [18-20] 20  BP: (113-125)/(61-68) 113/61  SpO2:  [92 %-98 %] 98 %  Body mass index is 30 5 kg/m²  Input and Output Summary (last 24 hours): Intake/Output Summary (Last 24 hours) at 3/30/2023 1357  Last data filed at 3/30/2023 0900  Gross per 24 hour   Intake 720 ml   Output --   Net 720 ml       Physical Exam:   Physical Exam  Vitals and nursing note reviewed  Constitutional:       General: He is not in acute distress       Appearance: Normal appearance  He is obese  HENT:      Head: Normocephalic and atraumatic  Mouth/Throat:      Mouth: Mucous membranes are moist       Pharynx: Oropharynx is clear  Eyes:      Extraocular Movements: Extraocular movements intact  Conjunctiva/sclera: Conjunctivae normal    Cardiovascular:      Rate and Rhythm: Normal rate and regular rhythm  Pulses: Normal pulses  Heart sounds: Normal heart sounds  Pulmonary:      Effort: Pulmonary effort is normal  No respiratory distress  Breath sounds: Normal breath sounds  No wheezing  Comments: Liters nasal cannula  Abdominal:      General: Bowel sounds are normal  There is no distension  Palpations: Abdomen is soft  Tenderness: There is no abdominal tenderness  Musculoskeletal:         General: Normal range of motion  Cervical back: Normal range of motion and neck supple  Skin:     General: Skin is warm and dry  Neurological:      General: No focal deficit present  Mental Status: He is alert and oriented to person, place, and time  Mental status is at baseline  Psychiatric:         Mood and Affect: Mood normal          Behavior: Behavior normal          Judgment: Judgment normal           Additional Data:     Labs:  Results from last 7 days   Lab Units 03/30/23  0506 03/29/23  0510 03/28/23  1331 03/24/23  1510   WBC Thousand/uL 18 17*   < > 15 62* 20 96*   HEMOGLOBIN g/dL 14 0   < > 14 5 16 0   HEMATOCRIT % 44 4   < > 47 0 50 8*   PLATELETS Thousands/uL 298   < > 306 451*   BANDS PCT %  --   --   --  10*   NEUTROS PCT %  --   --  80*  --    LYMPHS PCT %  --   --  7*  --    LYMPHO PCT %  --   --   --  3*   MONOS PCT %  --   --  9  --    MONO PCT %  --   --   --  10   EOS PCT %  --   --  1 2    < > = values in this interval not displayed       Results from last 7 days   Lab Units 03/30/23  0506 03/29/23  0510 03/28/23  1331   SODIUM mmol/L 139   < > 138   POTASSIUM mmol/L 4 4   < > 4 1   CHLORIDE mmol/L 102   < > 96   CO2 mmol/L 31   < > 37*   BUN mg/dL 20   < > 15   CREATININE mg/dL 0 94   < > 1 07   ANION GAP mmol/L 6   < > 5   CALCIUM mg/dL 8 5   < > 9 0   ALBUMIN g/dL  --   --  3 5   TOTAL BILIRUBIN mg/dL  --   --  0 59   ALK PHOS U/L  --   --  60   ALT U/L  --   --  20   AST U/L  --   --  21   GLUCOSE RANDOM mg/dL 156*   < > 112    < > = values in this interval not displayed  Results from last 7 days   Lab Units 03/28/23  1358   INR  1 10             Results from last 7 days   Lab Units 03/30/23  0506 03/29/23  0510 03/28/23  1358   LACTIC ACID mmol/L  --   --  1 3   PROCALCITONIN ng/ml 0 10 0 09 0 12       Lines/Drains:  Invasive Devices     None               Imaging: No new imaging  Recent Cultures (last 7 days):   Results from last 7 days   Lab Units 03/28/23  1400 03/28/23  1358   BLOOD CULTURE  No Growth at 24 hrs  No Growth at 24 hrs         Last 24 Hours Medication List:   Current Facility-Administered Medications   Medication Dose Route Frequency Provider Last Rate   • acetaminophen  650 mg Oral Q6H PRN Praveen Rein, CRNP     • aspirin  81 mg Oral Daily Praveen Rein, CRNP     • atorvastatin  80 mg Oral Daily With María Bernstein PA-C     • budesonide  0 5 mg Nebulization Q12H Jessica Cole MD     • cefTRIAXone  2,000 mg Intravenous Q24H Praveen Rein, CRNP 2,000 mg (03/29/23 1400)   • dextromethorphan-guaiFENesin  10 mL Oral TID PRN Praveen Rein, CRNP     • enoxaparin  40 mg Subcutaneous Daily Praveen Rein, CRNP     • furosemide  40 mg Oral Daily Praveen Rein, CRNP     • gabapentin  300 mg Oral BID Praveen Rein, CRNP     • ipratropium  0 5 mg Nebulization TID Jessica Cole MD     • levalbuterol  1 25 mg Nebulization TID Jessica Cole MD     • methylPREDNISolone sodium succinate  40 mg Intravenous Q12H [facility] Jessica Cole MD     • metoprolol succinate  12 5 mg Oral BID Praveen Rein, CRNP     • potassium chloride  20 mEq Oral HS Hermes Martinez PA-C     • [START ON 4/1/2023] predniSONE  40 mg Oral Daily Sydney Wilson MD          Today, Patient Was Seen By: [facility],[address],     **Please Note: This note may have been constructed using a voice recognition system  **

## 2023-03-31 VITALS
TEMPERATURE: 96.5 F | WEIGHT: 200.62 LBS | SYSTOLIC BLOOD PRESSURE: 128 MMHG | OXYGEN SATURATION: 97 % | BODY MASS INDEX: 30.41 KG/M2 | HEIGHT: 68 IN | HEART RATE: 75 BPM | RESPIRATION RATE: 20 BRPM | DIASTOLIC BLOOD PRESSURE: 81 MMHG

## 2023-03-31 LAB
2HR DELTA HS TROPONIN: -1 NG/L
4HR DELTA HS TROPONIN: -2 NG/L
ANION GAP SERPL CALCULATED.3IONS-SCNC: 4 MMOL/L (ref 4–13)
ATRIAL RATE: 85 BPM
BUN SERPL-MCNC: 27 MG/DL (ref 5–25)
CALCIUM SERPL-MCNC: 8.2 MG/DL (ref 8.4–10.2)
CARDIAC TROPONIN I PNL SERPL HS: 19 NG/L
CARDIAC TROPONIN I PNL SERPL HS: 20 NG/L
CARDIAC TROPONIN I PNL SERPL HS: 21 NG/L
CHLORIDE SERPL-SCNC: 99 MMOL/L (ref 96–108)
CO2 SERPL-SCNC: 33 MMOL/L (ref 21–32)
CREAT SERPL-MCNC: 1.09 MG/DL (ref 0.6–1.3)
DME PARACHUTE DELIVERY DATE ACTUAL: NORMAL
DME PARACHUTE DELIVERY DATE REQUESTED: NORMAL
DME PARACHUTE DELIVERY NOTE: NORMAL
DME PARACHUTE ITEM DESCRIPTION: NORMAL
DME PARACHUTE ORDER STATUS: NORMAL
DME PARACHUTE SUPPLIER NAME: NORMAL
DME PARACHUTE SUPPLIER PHONE: NORMAL
ERYTHROCYTE [DISTWIDTH] IN BLOOD BY AUTOMATED COUNT: 13.9 % (ref 11.6–15.1)
GFR SERPL CREATININE-BSD FRML MDRD: 64 ML/MIN/1.73SQ M
GLUCOSE SERPL-MCNC: 155 MG/DL (ref 65–140)
HCT VFR BLD AUTO: 42.8 % (ref 36.5–49.3)
HGB BLD-MCNC: 13.4 G/DL (ref 12–17)
MCH RBC QN AUTO: 31.8 PG (ref 26.8–34.3)
MCHC RBC AUTO-ENTMCNC: 31.3 G/DL (ref 31.4–37.4)
MCV RBC AUTO: 102 FL (ref 82–98)
P AXIS: 75 DEGREES
PLATELET # BLD AUTO: 303 THOUSANDS/UL (ref 149–390)
PMV BLD AUTO: 10.3 FL (ref 8.9–12.7)
POTASSIUM SERPL-SCNC: 4.5 MMOL/L (ref 3.5–5.3)
PR INTERVAL: 150 MS
QRS AXIS: 65 DEGREES
QRSD INTERVAL: 92 MS
QT INTERVAL: 384 MS
QTC INTERVAL: 456 MS
RBC # BLD AUTO: 4.21 MILLION/UL (ref 3.88–5.62)
SODIUM SERPL-SCNC: 136 MMOL/L (ref 135–147)
T WAVE AXIS: 40 DEGREES
VENTRICULAR RATE: 85 BPM
WBC # BLD AUTO: 25.51 THOUSAND/UL (ref 4.31–10.16)

## 2023-03-31 PROCEDURE — 84484 ASSAY OF TROPONIN QUANT: CPT | Performed by: PHYSICIAN ASSISTANT

## 2023-03-31 PROCEDURE — 93005 ELECTROCARDIOGRAM TRACING: CPT

## 2023-03-31 PROCEDURE — 80048 BASIC METABOLIC PNL TOTAL CA: CPT | Performed by: INTERNAL MEDICINE

## 2023-03-31 PROCEDURE — 85027 COMPLETE CBC AUTOMATED: CPT | Performed by: INTERNAL MEDICINE

## 2023-03-31 PROCEDURE — 93010 ELECTROCARDIOGRAM REPORT: CPT | Performed by: INTERNAL MEDICINE

## 2023-03-31 PROCEDURE — 94640 AIRWAY INHALATION TREATMENT: CPT

## 2023-03-31 PROCEDURE — 94760 N-INVAS EAR/PLS OXIMETRY 1: CPT

## 2023-03-31 PROCEDURE — 99232 SBSQ HOSP IP/OBS MODERATE 35: CPT | Performed by: INTERNAL MEDICINE

## 2023-03-31 PROCEDURE — 94664 DEMO&/EVAL PT USE INHALER: CPT

## 2023-03-31 PROCEDURE — 99239 HOSP IP/OBS DSCHRG MGMT >30: CPT | Performed by: INTERNAL MEDICINE

## 2023-03-31 RX ORDER — METOPROLOL SUCCINATE 25 MG/1
12.5 TABLET, EXTENDED RELEASE ORAL 2 TIMES DAILY
Qty: 90 TABLET | Refills: 0 | Status: SHIPPED | OUTPATIENT
Start: 2023-03-31 | End: 2023-05-05

## 2023-03-31 RX ORDER — FORMOTEROL FUMARATE DIHYDRATE 20 UG/2ML
20 SOLUTION RESPIRATORY (INHALATION)
Qty: 120 ML | Refills: 0 | Status: SHIPPED | OUTPATIENT
Start: 2023-03-31 | End: 2023-04-20 | Stop reason: SDUPTHER

## 2023-03-31 RX ORDER — MAGNESIUM HYDROXIDE/ALUMINUM HYDROXICE/SIMETHICONE 120; 1200; 1200 MG/30ML; MG/30ML; MG/30ML
30 SUSPENSION ORAL ONCE
Status: COMPLETED | OUTPATIENT
Start: 2023-03-31 | End: 2023-03-31

## 2023-03-31 RX ORDER — AZITHROMYCIN 500 MG/1
TABLET, FILM COATED ORAL
Qty: 36 TABLET | Refills: 1 | Status: ON HOLD | OUTPATIENT
Start: 2023-03-31 | End: 2023-06-29

## 2023-03-31 RX ORDER — BUDESONIDE 0.5 MG/2ML
0.5 INHALANT ORAL
Qty: 120 ML | Refills: 0 | Status: SHIPPED | OUTPATIENT
Start: 2023-03-31 | End: 2023-05-30

## 2023-03-31 RX ORDER — IPRATROPIUM BROMIDE AND ALBUTEROL SULFATE 2.5; .5 MG/3ML; MG/3ML
3 SOLUTION RESPIRATORY (INHALATION) 3 TIMES DAILY
Qty: 270 ML | Refills: 0 | Status: SHIPPED | OUTPATIENT
Start: 2023-03-31 | End: 2023-04-30

## 2023-03-31 RX ORDER — PREDNISONE 20 MG/1
TABLET ORAL
Qty: 15 TABLET | Refills: 0 | Status: SHIPPED | OUTPATIENT
Start: 2023-04-01 | End: 2023-04-17

## 2023-03-31 RX ORDER — HYDROCODONE BITARTRATE AND ACETAMINOPHEN 5; 325 MG/1; MG/1
1 TABLET ORAL EVERY 6 HOURS PRN
Status: DISCONTINUED | OUTPATIENT
Start: 2023-03-31 | End: 2023-03-31 | Stop reason: HOSPADM

## 2023-03-31 RX ORDER — NITROGLYCERIN 0.4 MG/1
0.4 TABLET SUBLINGUAL
Status: DISCONTINUED | OUTPATIENT
Start: 2023-03-31 | End: 2023-03-31 | Stop reason: HOSPADM

## 2023-03-31 RX ORDER — FORMOTEROL FUMARATE DIHYDRATE 20 UG/2ML
20 SOLUTION RESPIRATORY (INHALATION)
Status: DISCONTINUED | OUTPATIENT
Start: 2023-03-31 | End: 2023-03-31 | Stop reason: HOSPADM

## 2023-03-31 RX ADMIN — FUROSEMIDE 40 MG: 40 TABLET ORAL at 08:56

## 2023-03-31 RX ADMIN — NITROGLYCERIN 0.4 MG: 0.4 TABLET SUBLINGUAL at 03:10

## 2023-03-31 RX ADMIN — BUDESONIDE 0.5 MG: 0.5 INHALANT ORAL at 07:07

## 2023-03-31 RX ADMIN — LEVALBUTEROL HYDROCHLORIDE 1.25 MG: 1.25 SOLUTION RESPIRATORY (INHALATION) at 07:07

## 2023-03-31 RX ADMIN — METOPROLOL SUCCINATE 12.5 MG: 25 TABLET, EXTENDED RELEASE ORAL at 08:56

## 2023-03-31 RX ADMIN — HYDROCODONE BITARTRATE AND ACETAMINOPHEN 1 TABLET: 5; 325 TABLET ORAL at 03:52

## 2023-03-31 RX ADMIN — IPRATROPIUM BROMIDE 0.5 MG: 0.5 SOLUTION RESPIRATORY (INHALATION) at 07:07

## 2023-03-31 RX ADMIN — ENOXAPARIN SODIUM 40 MG: 100 INJECTION SUBCUTANEOUS at 08:56

## 2023-03-31 RX ADMIN — NITROGLYCERIN 0.4 MG: 0.4 TABLET SUBLINGUAL at 03:21

## 2023-03-31 RX ADMIN — ALUMINUM HYDROXIDE, MAGNESIUM HYDROXIDE, AND DIMETHICONE 30 ML: 200; 20; 200 SUSPENSION ORAL at 03:52

## 2023-03-31 RX ADMIN — GABAPENTIN 300 MG: 300 CAPSULE ORAL at 08:56

## 2023-03-31 NOTE — ASSESSMENT & PLAN NOTE
· No significant wheezing on examination this morning  · Pulmonology evaluation appreciated:  · Prednisone taper; begin 40 mg daily and decrease by 10 mg every 3 days  · Pulmicort and Performa mist nebulized solution twice daily  · DuoNebs 3 times daily  · Resume azithromycin 3 times weekly  · Further outpatient pulmonology follow-up recommended

## 2023-03-31 NOTE — NURSING NOTE
Pt is independent pt refused to bathe due to taking a shower yesterday pt not out of bed but ambulates in room RN aware

## 2023-03-31 NOTE — DISCHARGE SUMMARY
[address]  Discharge- Marques Jarrett 1945, 65 y o  male MRN: [id number]  Unit/Bed#: -01 Encounter: [id number]  Primary Care Provider: Marisol Araya DO   Date and time admitted to hospital: 3/28/2023  1:21 PM    * Acute on chronic respiratory failure with hypoxia [facility])  Assessment & Plan  · Presented for evaluation of increased shortness of breath and hypoxia  · Acute exacerbation has resolved and the patient is currently at his baseline oxygen requirements  · CXR (3/28): Severe emphysema with persistent bibasilar pneumonia, greater on the right     · Further management as below    COPD with exacerbation ([facility] [address] )  Assessment & Plan  · No significant wheezing on examination this morning  · Pulmonology evaluation appreciated:  · Prednisone taper; begin 40 mg daily and decrease by 10 mg every 3 days  · Pulmicort and Performa mist nebulized solution twice daily  · DuoNebs 3 times daily  · Resume azithromycin 3 times weekly  · Further outpatient pulmonology follow-up recommended    Pneumonia  Assessment & Plan  · Patient likely does not have active/recurrent pneumonia but with imaging lagging  · Recent hospitalizations for RSV, COVID, and bacterial pneumonia  · Chest x-ray as above  · Discontinue Rocephin and will resume the patient's azithromycin 3 times weekly for COPD prophylaxis    Coronary artery disease involving native coronary artery of native heart without angina pectoris  Assessment & Plan  · Continue home aspirin 81 mg daily, metoprolol 12 5 mg twice daily, and resume home Crestor 40 mg daily    Chronic diastolic (congestive) heart failure (HCC)  Assessment & Plan  Wt Readings from Last 3 Encounters:   03/28/23 91 kg (200 lb 9 9 oz)   03/23/23 91 2 kg (201 lb)   03/22/23 93 4 kg (206 lb)     · Does not appear acutely volume overloaded at this time  · Continue home Toprol as above and Lasix 40 mg daily      Medical Problems     Resolved Problems  Date Reviewed: 3/22/2023 None       Discharging Physician / Practitioner: Kenyatta Irby DO  PCP: Lennie Benitez DO  Admission Date:   Admission Orders (From admission, onward)     Ordered        03/28/23 1515  [facility]  Once                      Discharge Date: 03/31/23    Consultations During Hospital Stay:  · Pulmonology    Procedures Performed:   · None    Significant Findings / Test Results:   · CXR (3/28): Severe emphysema with persistent bibasilar pneumonia, greater on the right  Incidental Findings:   · None     Test Results Pending at Discharge (will require follow up): · None     Outpatient Tests Requested:  · To be determined upon outpatient follow-up with PCP and pulmonology    Complications: None    Reason for Admission: Acute on chronic hypoxic respiratory failure    Hospital Course:   Koby Marti is a 65 y o  male patient who originally presented to the hospital on 3/28/2023 due to shortness of breath  Please see H&P as documented by Peri Maldonado for complete details regarding history of presenting illness  In brief, the patient has a past medical history of COPD with chronic respiratory failure, coronary artery disease, and diastolic heart failure who presented for increased shortness of breath and dyspnea on exertion  In brief, the patient has had several hospitalizations in the last few months for viral and bacterial pneumonia associated with COPD exacerbation  He notes frustration with his increasing oxygen requirements as he was previously using only 2 L and has been requiring up to 4 L  Upon arrival to the ED, x-ray was performed and showed findings suspicious for pneumonia  The patient was started on IV antibiotics and IV steroids and admitted to the medical floor for further observation and management  During his hospitalization he noted improvement in his breathing with the above therapies and was evaluated by pulmonology for respiratory optimization    He was switched to "nebulizer only therapies his hospitalization and was advised to continue this regimen at discharge as well  Chest x-ray demonstrated findings concerning for pneumonia, the patient had no fevers and negative procalcitonin suggesting no active pneumonia but imaging lagging behind his disease process  He was ultimately discharged home and advised to follow-up outpatient with pulmonology  He was discharged in stable condition and all of his questions were answered prior to departure  This is a brief discharge summary please see full medical record for more details  Please see above list of diagnoses and related plan for additional information  Condition at Discharge: stable    Discharge Day Visit / Exam:   Subjective: Patient sitting up at the bedside without any acute complaints  No significant overnight events reported by nursing  Vitals: Blood Pressure: 128/81 (03/31/23 0728)  Pulse: 75 (03/31/23 0728)  Temperature: (!) 96 5 °F (35 8 °C) (03/31/23 0728)  Temp Source: Oral (03/31/23 0011)  Respirations: 20 (03/31/23 0728)  Height: 5' 8\" (172 7 cm) (03/28/23 1701)  Weight - Scale: 91 kg (200 lb 9 9 oz) (03/28/23 1701)  SpO2: 97 % (03/31/23 0728)     Exam:   Physical Exam  Vitals and nursing note reviewed  Constitutional:       General: He is not in acute distress  Appearance: Normal appearance  He is obese  HENT:      Head: Normocephalic and atraumatic  Mouth/Throat:      Mouth: Mucous membranes are moist       Pharynx: Oropharynx is clear  Eyes:      Extraocular Movements: Extraocular movements intact  Conjunctiva/sclera: Conjunctivae normal    Cardiovascular:      Rate and Rhythm: Normal rate and regular rhythm  Pulses: Normal pulses  Heart sounds: Normal heart sounds  Pulmonary:      Effort: Pulmonary effort is normal  No respiratory distress  Breath sounds: Wheezing present        Comments: 3 L nasal cannula  Abdominal:      General: Bowel sounds are normal  There " is no distension  Palpations: Abdomen is soft  Tenderness: There is no abdominal tenderness  Musculoskeletal:         General: Normal range of motion  Cervical back: Normal range of motion and neck supple  Skin:     General: Skin is warm and dry  Neurological:      General: No focal deficit present  Mental Status: He is alert and oriented to person, place, and time  Mental status is at baseline  Psychiatric:         Mood and Affect: Mood normal          Behavior: Behavior normal          Judgment: Judgment normal         Discussion with Family: Patient updated on plan of care  Discharge instructions/Information to patient and family:   See after visit summary for information provided to patient and family  Provisions for Follow-Up Care:  See after visit summary for information related to follow-up care and any pertinent home health orders  Disposition:   Home    Planned Readmission: None     Discharge Statement:  I spent > 35 minutes discharging the patient  This time was spent on the day of discharge  I had direct contact with the patient on the day of discharge  Greater than 50% of the total time was spent examining patient, answering all patient questions, arranging and discussing plan of care with patient as well as directly providing post-discharge instructions  Additional time then spent on discharge activities  Discharge Medications:  See after visit summary for reconciled discharge medications provided to patient and/or family        **Please Note: This note may have been constructed using a voice recognition system**

## 2023-03-31 NOTE — ASSESSMENT & PLAN NOTE
· Continue home aspirin 81 mg daily, metoprolol 12 5 mg twice daily, and resume home Crestor 40 mg daily

## 2023-03-31 NOTE — CASE MANAGEMENT
Case Management Discharge Planning Note    Patient name Leah Barboza  Location /-72 MRN [id number]  : 1945 Date 3/31/2023       Current Admission Date: 3/28/2023  Current Admission Diagnosis:Acute on chronic respiratory failure with hypoxia [facility])   Patient Active Problem List    Diagnosis Date Noted   • Orthopnea 2023   • Lower extremity edema 2023   • Irregular heart rhythm 2023   • Pneumonia 03/15/2023   • Pneumothorax on right 2023   • Leukocytosis 2023   • Left leg pain 2023   • Transient right leg weakness 2023   • Hypokalemia 2023   • COVID 2023   • Ambulatory dysfunction 2023   • COPD (chronic obstructive pulmonary disease) ([facility] [address] ) 2023   • Right lower quadrant abdominal pain 10/23/2022   • RSV (respiratory syncytial virus infection) 10/21/2022   • Obstructive sleep apnea syndrome in adult 10/19/2022   • Sensorineural hearing loss, bilateral 10/19/2022   • Acute on chronic respiratory failure with hypoxia ([address] ) 10/15/2022   • Prostate cancer ([facility] [address] ) 2022   • Elevated MCV 2022   • Cubital tunnel syndrome on left 2022   • Carpal tunnel syndrome on left 2022   • Intercostal neuralgia 2022   • Urinary frequency 2022   • Incomplete bladder emptying 2022   • Chronic bilateral low back pain with right-sided sciatica 2022   • Mid back pain 2022   • Thoracic radiculopathy 2022   • Chronic pain syndrome 2022   • Stage 3a chronic kidney disease ([address] ) 2022   • Hoarseness or changing voice 2022   • Chronic right-sided thoracic back pain 2022   • Primary insomnia 2022   • Right hip pain 2022   • Abnormal nuclear stress test 2021   • Chest pain 2021   • Costochondral chest pain 01/15/2021   • COPD with exacerbation ([facility] [address] ) 2021   • Oxygen dependent 2021   • S/P carotid endarterectomy 2021   • Stented coronary artery 01/11/2021   • Vertigo 01/11/2021   • Anisometropia 01/11/2021   • Osteoarthritis of spinal facet joint 01/11/2021   • Chronic ischemic heart disease 01/11/2021   • Chronic diastolic (congestive) heart failure ([facility] [address] ) 01/11/2021   • Diverticular disease of colon 01/11/2021   • Dry eye syndrome 01/11/2021   • Gastroesophageal reflux disease 01/11/2021   • Hearing loss 01/11/2021   • Elevated PSA 01/11/2021   • Hypoxia 01/11/2021   • Lens replaced by other means 01/11/2021   • Lumbar radiculopathy 01/11/2021   • Multinodular goiter 01/11/2021   • Nuclear senile cataract 01/11/2021   • Obesity 01/11/2021   • Occlusion and stenosis of carotid artery 01/11/2021   • Osteoarthritis of hip 01/11/2021   • Prediabetes 01/11/2021   • Psychosexual dysfunction with inhibited sexual excitement 01/11/2021   • LAMBERTO on CPAP 01/11/2021   • Solitary pulmonary nodule 01/11/2021   • Thyroid nodule 01/11/2021   • Guyon syndrome, 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   • Depression, recurrent ([facility] [address] )    • Hyperlipidemia    • Coronary artery disease involving native coronary artery of native heart without angina pectoris    • Left carotid artery occlusion    • Hypertension       LOS (days): 3  Geometric Mean LOS (GMLOS) (days): 4 00  Days to GMLOS:1 3     OBJECTIVE:  Risk of Unplanned Readmission Score: 42 77         Current admission status: Inpatient   Preferred Pharmacy:   [address] [place] [address], [address] [place] [address] Mahendra [address]ONES[address]  [address]  [place] [address]  Phone: [phone] Fax: [phone]    [address] Delivery ([facility] ) - Samantha [address]  Phone: [phone] Fax: [phone]    Primary Care Provider: Izabella Phillips DO    Primary Insurance: [facility]  Secondary Insurance:     DISCHARGE DETAILS:    IMM Given (Date):: 03/31/23  IMM Given to[de-identified] Patient     Additional Comments: Pt for potential discharge today per Dr Leonard Cole   Pt will return home with family tranpsorting  No identified CM discharge needs at this time  Pt provided with nebulizer from [employer] and processed through [employer] [place]

## 2023-03-31 NOTE — NURSING NOTE
Pt discharged home, vss on 4L NC personal portable o2 concentrator  AVS read and explained to pt, all questions answered  IV removed without complications and tip intact, all belongings with pt  Pt denies pain  Pt escorted to front entrance and assisted into car of daughter

## 2023-03-31 NOTE — PROGRESS NOTES
"Progress Note - Pulmonary   Sonia Irvona 65 y o  male MRN: [id number]  Unit/Bed#: -01 Encounter: [id number]      Assessment/Plan:    · Acute on chronic hypoxic respiratory failure-currently requiring 4 L/min  Baseline needs are 2 L/min continuously  · COPD with acute exacerbation-continue prednisone 40 mg daily and taper by 10 mg every 3 days  Patient has multiple recent exacerbations and seems to be failing handheld inhaler therapy  Agree with all-nebulized regimen  On discharge, please prescribe new nebulizer machine with Perforomist and budesonide twice daily and DuoNeb 3 times daily  Patient is interested in pursuing bronchoscopic lung volume reduction, however would need updated PFTs, ABG and 6-minute walk test   He had met with Dr Dennis Chung in 2019, but was felt not to be a candidate at that time due to depressed ejection fraction  Most recent echocardiogram shows an EF of 45% which would not be a contraindication at this time  Ideally, he would enroll in pulmonary rehabilitation, but was unable to afford the co-pay in the past   We will provide an online rehab resource in the hopes of him following a more formal exercise regime  He should continue azithromycin 3 times weekly  · Abnormal chest x-ray with bibasilar atelectasis/infiltrate-encourage incentive spirometry  Sputum culture negative  Procalcitonin negative  Can likely discharge home without antibiotics    · Obstructive sleep apnea-CPAP during hours of sleep    · History of iatrogenic right-sided pneumothorax (Feb 2022) - after having intercostal nerve block    Chief Complaint: \"I am feeling better\"    Subjective:   Patient is feeling better  Hoping to go home  Reports less cough  Still becomes breathless with exertion  Objective:     Vitals: Blood pressure 128/81, pulse 75, temperature (!) 96 5 °F (35 8 °C), resp  rate 20, height 5' 8\" (1 727 m), weight 91 kg (200 lb 9 9 oz), SpO2 97 %  ,  4 L, Body mass index is 30 5 " kg/m²  Intake/Output Summary (Last 24 hours) at 3/31/2023 0813  Last data filed at 3/30/2023 2050  Gross per 24 hour   Intake 840 ml   Output 450 ml   Net 390 ml     Physical Exam:      General:  Awake, alert, no distress   HEENT: No scleral icterus, EOMI, moist mucosa    Heart:  Regular rate and rhythm, no murmur   Lungs: Decreased BS throughout, scant exp wheeze   Abdomen: Soft, nontender, normal bowel sounds   Extremities: No clubbing, cyanosis or edema    Labs: I have personally reviewed pertinent lab results  Results from last 7 days   Lab Units 03/31/23  0517 03/30/23  0506 03/29/23  0510   WBC Thousand/uL 25 51* 18 17* 15 73*   HEMOGLOBIN g/dL 13 4 14 0 13 5   HEMATOCRIT % 42 8 44 4 43 1   PLATELETS Thousands/uL 303 298 297         Results from last 7 days   Lab Units 03/31/23  0517 03/30/23  0506 03/29/23  0510 03/28/23  1331 03/24/23  1510   POTASSIUM mmol/L 4 5 4 4 4 6 4 1 3 8   CHLORIDE mmol/L 99 102 101 96 99   CO2 mmol/L 33* 31 32 37* 37*   BUN mg/dL 27* 20 16 15 22   CREATININE mg/dL 1 09 0 94 0 94 1 07 1 15   CALCIUM mg/dL 8 2* 8 5 8 8 9 0 9 3   ALK PHOS U/L  --   --   --  60 79   ALT U/L  --   --   --  20 59   AST U/L  --   --   --  21 29     Results from last 7 days   Lab Units 03/28/23  1358   INR  1 10   PTT seconds 26     Imaging and other studies: I have personally reviewed pertinent reports     and I have personally reviewed pertinent films in PACS chest x-ray on 3/28/2023 shows severe for Nadyne Leonard with persistent bibasilar infiltrate right greater than left

## 2023-03-31 NOTE — ASSESSMENT & PLAN NOTE
· Patient likely does not have active/recurrent pneumonia but with imaging lagging  · Recent hospitalizations for RSV, COVID, and bacterial pneumonia  · Chest x-ray as above  · Discontinue Rocephin and will resume the patient's azithromycin 3 times weekly for COPD prophylaxis

## 2023-03-31 NOTE — ASSESSMENT & PLAN NOTE
· Presented for evaluation of increased shortness of breath and hypoxia  · Acute exacerbation has resolved and the patient is currently at his baseline oxygen requirements  · CXR (3/28): Severe emphysema with persistent bibasilar pneumonia, greater on the right     · Further management as below

## 2023-03-31 NOTE — PLAN OF CARE
Problem: Potential for Falls  Goal: Patient will remain free of falls  Description: INTERVENTIONS:  - Educate patient/family on patient safety including physical limitations  - Instruct patient to call for assistance with activity   - Consult OT/PT to assist with strengthening/mobility   - Keep Call bell within reach  - Keep bed low and locked with side rails adjusted as appropriate  - Keep care items and personal belongings within reach  - Initiate and maintain comfort rounds  - Make Fall Risk Sign visible to staff  - Offer Toileting every 2 Hours, in advance of need  - Initiate/Maintain bed/chair alarm  - Obtain necessary fall risk management equipment: alarms, yellow socks and bracelet  - Apply yellow socks and bracelet for high fall risk patients  - Consider moving patient to room near nurses station  Outcome: Progressing     Problem: PAIN - ADULT  Goal: Verbalizes/displays adequate comfort level or baseline comfort level  Description: Interventions:  - Encourage patient to monitor pain and request assistance  - Assess pain using appropriate pain scale  - Administer analgesics based on type and severity of pain and evaluate response  - Implement non-pharmacological measures as appropriate and evaluate response  - Consider cultural and social influences on pain and pain management  - Notify physician/advanced practitioner if interventions unsuccessful or patient reports new pain  Outcome: Progressing     Problem: INFECTION - ADULT  Goal: Absence or prevention of progression during hospitalization  Description: INTERVENTIONS:  - Assess and monitor for signs and symptoms of infection  - Monitor lab/diagnostic results  - Monitor all insertion sites  - Monitor endotracheal if appropriate and nasal secretions for changes in amount and color  - [place] appropriate cooling/warming therapies per order  - Administer medications as ordered  - Instruct and encourage patient and family to use good hand hygiene technique  - Identify and instruct in appropriate isolation precautions for identified infection/condition  Outcome: Progressing     Problem: SAFETY ADULT  Goal: Patient will remain free of falls  Description: INTERVENTIONS:  - Educate patient/family on patient safety including physical limitations  - Instruct patient to call for assistance with activity   - Consult OT/PT to assist with strengthening/mobility   - Keep Call bell within reach  - Keep bed low and locked with side rails adjusted as appropriate  - Keep care items and personal belongings within reach  - Initiate and maintain comfort rounds  - Make Fall Risk Sign visible to staff  - Offer Toileting every 2 Hours, in advance of need  - Initiate/Maintain bed/chair alarm  - Obtain necessary fall risk management equipment: alarms, yellow socks and bracelet  - Apply yellow socks and bracelet for high fall risk patients  - Consider moving patient to room near nurses station  Outcome: Progressing  Goal: Maintain or return to baseline ADL function  Description: INTERVENTIONS:  -  Assess patient's ability to carry out ADLs; assess patient's baseline for ADL function and identify physical deficits which impact ability to perform ADLs (bathing, care of mouth/teeth, toileting, grooming, dressing, etc )  - Assess/evaluate cause of self-care deficits   - Assess range of motion  - Assess patient's mobility; develop plan if impaired  - Assess patient's need for assistive devices and provide as appropriate  - Encourage maximum independence but intervene and supervise when necessary  - Involve family in performance of ADLs  - Assess for home care needs following discharge   - Consider OT consult to assist with ADL evaluation and planning for discharge  - Provide patient education as appropriate  Outcome: Progressing  Goal: Maintains/Returns to pre admission functional level  Description: INTERVENTIONS:  - Perform BMAT or MOVE assessment daily    - Set and communicate daily mobility goal to care team and patient/family/caregiver  - Collaborate with rehabilitation services on mobility goals if consulted  - Perform Range of Motion 3 times a day  - Reposition patient every 2 hours  - Dangle patient 3 times a day  - Stand patient 3 times a day  - Ambulate patient 3 times a day  - Out of bed to chair 3 times a day   - Out of bed for meals 3 times a day  - Out of bed for toileting  - Record patient progress and toleration of activity level   Outcome: Progressing     Problem: DISCHARGE PLANNING  Goal: Discharge to home or other facility with appropriate resources  Description: INTERVENTIONS:  - Identify barriers to discharge w/patient and caregiver  - Arrange for needed discharge resources and transportation as appropriate  - Identify discharge learning needs (meds, wound care, etc )  - Refer to Case Management Department for coordinating discharge planning if the patient needs post-hospital services based on physician/advanced practitioner order or complex needs related to functional status, cognitive ability, or social support system  Outcome: Progressing     Problem: Knowledge Deficit  Goal: Patient/family/caregiver demonstrates understanding of disease process, treatment plan, medications, and discharge instructions  Description: Complete learning assessment and assess knowledge base    Interventions:  - Provide teaching at level of understanding  - Provide teaching via preferred learning methods  Outcome: Progressing     Problem: MOBILITY - ADULT  Goal: Maintain or return to baseline ADL function  Description: INTERVENTIONS:  -  Assess patient's ability to carry out ADLs; assess patient's baseline for ADL function and identify physical deficits which impact ability to perform ADLs (bathing, care of mouth/teeth, toileting, grooming, dressing, etc )  - Assess/evaluate cause of self-care deficits   - Assess range of motion  - Assess patient's mobility; develop plan if impaired  - Assess patient's need for assistive devices and provide as appropriate  - Encourage maximum independence but intervene and supervise when necessary  - Involve family in performance of ADLs  - Assess for home care needs following discharge   - Consider OT consult to assist with ADL evaluation and planning for discharge  - Provide patient education as appropriate  Outcome: Progressing  Goal: Maintains/Returns to pre admission functional level  Description: INTERVENTIONS:  - Perform BMAT or MOVE assessment daily    - Set and communicate daily mobility goal to care team and patient/family/caregiver  - Collaborate with rehabilitation services on mobility goals if consulted  - Perform Range of Motion 3 times a day  - Reposition patient every 2 hours    - Dangle patient 3 times a day  - Stand patient 3 times a day  - Ambulate patient 3 times a day  - Out of bed to chair 3 times a day   - Out of bed for meals 3 times a day  - Out of bed for toileting  - Record patient progress and toleration of activity level   Outcome: Progressing     Problem: RESPIRATORY - ADULT  Goal: Achieves optimal ventilation and oxygenation  Description: INTERVENTIONS:  - Assess for changes in respiratory status  - Assess for changes in mentation and behavior  - Position to facilitate oxygenation and minimize respiratory effort  - Oxygen administered by appropriate delivery if ordered  - Initiate smoking cessation education as indicated  - Encourage broncho-pulmonary hygiene including cough, deep breathe, Incentive Spirometry  - Assess the need for suctioning and aspirate as needed  - Assess and instruct to report SOB or any respiratory difficulty  - Respiratory Therapy support as indicated  Outcome: Progressing

## 2023-04-02 ENCOUNTER — HOME CARE VISIT (OUTPATIENT)
Dept: HOME HEALTH SERVICES | Facility: HOME HEALTHCARE | Age: 78
End: 2023-04-02

## 2023-04-02 LAB
BACTERIA BLD CULT: NORMAL
BACTERIA BLD CULT: NORMAL

## 2023-04-03 ENCOUNTER — HOME CARE VISIT (OUTPATIENT)
Dept: HOME HEALTH SERVICES | Facility: HOME HEALTHCARE | Age: 78
End: 2023-04-03

## 2023-04-03 ENCOUNTER — TRANSITIONAL CARE MANAGEMENT (OUTPATIENT)
Dept: FAMILY MEDICINE CLINIC | Facility: CLINIC | Age: 78
End: 2023-04-03

## 2023-04-03 PROBLEM — A41.9 SEPSIS DUE TO PNEUMONIA (HCC): Status: ACTIVE | Noted: 2023-04-03

## 2023-04-03 PROBLEM — H91.91 ACUTE HEARING LOSS, RIGHT: Status: ACTIVE | Noted: 2021-01-11

## 2023-04-03 PROBLEM — J18.9 SEPSIS DUE TO PNEUMONIA (HCC): Status: ACTIVE | Noted: 2023-04-03

## 2023-04-03 NOTE — UTILIZATION REVIEW
NOTIFICATION OF ADMISSION DISCHARGE   This is a Notification of Discharge from [address]  Please be advised that this patient has been discharge from our facility  Below you will find the admission and discharge date and time including the patient’s disposition  UTILIZATION REVIEW CONTACT:  Rebekah Domínguez  Utilization   Network Utilization Review Department  Phone: [phone] carefully listen to the prompts  All voicemails are confidential   Email: [email or web address]     ADMISSION INFORMATION  PRESENTATION DATE: 3/28/2023  1:21 PM  OBERVATION ADMISSION DATE:   INPATIENT ADMISSION DATE: 3/28/23  3:15 PM   DISCHARGE DATE: 3/31/2023 12:19 PM   DISPOSITION:Home/Self Care    IMPORTANT INFORMATION:  Send all requests for admission clinical reviews, approved or denied determinations and any other requests to dedicated fax number below belonging to the campus where the patient is receiving treatment   List of dedicated fax numbers:  [address] DENIALS (Administrative/Medical Necessity) [phone]   [address] (Maternity/NICU/Pediatrics) [phone]   [facility] [phone]   [place] [address] [phone]   [address] [phone]   [address] [facility] [phone]   [address] [place] [address] [phone]   [address] [place] [address] 119 [phone]   [facility]  [phone]   [address] [facility] [phone]   [address] [place] [address] [address] [facility] [phone]

## 2023-04-04 PROBLEM — J18.9 PNA (PNEUMONIA): Status: RESOLVED | Noted: 2023-03-15 | Resolved: 2023-04-04

## 2023-04-04 PROBLEM — R77.8 ELEVATED TROPONIN: Status: ACTIVE | Noted: 2023-04-04

## 2023-04-04 PROBLEM — R79.89 ELEVATED TROPONIN: Status: ACTIVE | Noted: 2023-04-04

## 2023-04-20 ENCOUNTER — TELEPHONE (OUTPATIENT)
Dept: FAMILY MEDICINE CLINIC | Facility: CLINIC | Age: 78
End: 2023-04-20

## 2023-04-20 DIAGNOSIS — J44.1 CHRONIC OBSTRUCTIVE PULMONARY DISEASE WITH ACUTE EXACERBATION (HCC): Primary | ICD-10-CM

## 2023-04-20 NOTE — TELEPHONE ENCOUNTER
Patient is seeing palliative care next week  They are calling requesting a referral please be placed  Please place referral  Thanks!

## 2023-04-24 ENCOUNTER — TRANSITIONAL CARE MANAGEMENT (OUTPATIENT)
Dept: FAMILY MEDICINE CLINIC | Facility: CLINIC | Age: 78
End: 2023-04-24

## 2023-04-25 PROBLEM — I67.1 INTRACRANIAL ANEURYSM: Status: ACTIVE | Noted: 2023-04-25

## 2023-04-25 NOTE — ASSESSMENT & PLAN NOTE
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 ordered for transient RLE weakness with subsequent CTA  Has had intermittent RLE weakness for years since heart surgery  · Hx of asymptomatic left ICA occlusion, prior right carotid endarterectomy  · No FHx of aneurysm or sudden death  Former smoker, BP controlled  · Recent hospitalizations for COVID/RSV, on chronic O2 for COPD, CAD s/p PCI, prostates CA  · Exam: profound hearing loss, otherwise GCS 15 and non-focal    Imaging:  · CTA head/neck 2/11/23: No acute intracranial abnormality  Chronically occluded left ICA cervical segment at its origin  Markedly diminutive vessel in left ICA distal cervical segment extending to the skull base, may be receiving retrograde flow  Markedly diminutive left ICA proximal petrous segment (vertical and proximal horizontal) with asymmetrically smaller caliber of remainder of the left ICA intracranial segments, which may be receiving retrograde flow through patent anterior Elim IRA of [place]  0 3 cm focal fusiform aneurysm in left A1/A2 junction  Recommend consultation with the [facility], a [facility] of [facility] at [phone]  Additional chronic/incidental findings as detailed above  Plan:  Reviewed imaging with patient, wife, daughter, and Dr Senait Rodrigez  Small 3 mm left A1/A2 aneurysm  Discussed natural history of aneurysms and risk of rupture  Discussed genetic component to aneurysms as well as modifiable risk factors such as smoking and HTN  Given current size and location, risk of rupture is less than 1%/year per UCAS and <1%/5yrs per ISIUA  Reviewed red flag s/s  · With low likelihood of rupture and multiple co-morbidities that are more pressing (including 5 hospitalizations since the new year), no additional follow up imaging or appointments recommended  Call with any questions or concerns

## 2023-04-26 ENCOUNTER — OFFICE VISIT (OUTPATIENT)
Dept: NEUROSURGERY | Facility: CLINIC | Age: 78
End: 2023-04-26

## 2023-04-26 VITALS
DIASTOLIC BLOOD PRESSURE: 60 MMHG | SYSTOLIC BLOOD PRESSURE: 110 MMHG | RESPIRATION RATE: 15 BRPM | OXYGEN SATURATION: 88 % | HEART RATE: 62 BPM | BODY MASS INDEX: 29.4 KG/M2 | TEMPERATURE: 98 F | WEIGHT: 194 LBS | HEIGHT: 68 IN

## 2023-04-26 DIAGNOSIS — I67.1 BRAIN ANEURYSM: ICD-10-CM

## 2023-04-26 NOTE — PROGRESS NOTES
Neurosurgery Office Note  Hakeem Victor 65 y o  male MRN: [id number]      Assessment/Plan     Intracranial aneurysm  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 ordered for transient RLE weakness with subsequent CTA  Has had intermittent RLE weakness for years since heart surgery  · Hx of asymptomatic left ICA occlusion, prior right carotid endarterectomy  · No FHx of aneurysm or sudden death  Former smoker, BP controlled  · Recent hospitalizations for COVID/RSV, on chronic O2 for COPD, CAD s/p PCI, prostates CA  · Exam: profound hearing loss, otherwise GCS 15 and non-focal    Imaging:  · CTA head/neck 2/11/23: No acute intracranial abnormality  Chronically occluded left ICA cervical segment at its origin  Markedly diminutive vessel in left ICA distal cervical segment extending to the skull base, may be receiving retrograde flow  Markedly diminutive left ICA proximal petrous segment (vertical and proximal horizontal) with asymmetrically smaller caliber of remainder of the left ICA intracranial segments, which may be receiving retrograde flow through patent anterior [place]  0 3 cm focal fusiform aneurysm in left A1/A2 junction  [facility]commend [facility] with [facility], a [facility] of [address] [place] [address] Neuroscience at [phone]  Additional chronic/incidental findings as detailed above  Plan:  Reviewed imaging with patient, wife, daughter, and Dr Ernestina Bernheim  Small 3 mm left A1/A2 aneurysm  Discussed natural history of aneurysms and risk of rupture  Discussed genetic component to aneurysms as well as modifiable risk factors such as smoking and HTN  Given current size and location, risk of rupture is less than 1%/year per UCAS and <1%/5yrs per [facility]  Reviewed red flag s/s    · With low likelihood of rupture and multiple co-morbidities that are more pressing (including 5 hospitalizations since the new year), no additional follow up imaging or appointments recommended  Call with any questions or concerns  Diagnoses and all orders for this visit:    Brain aneurysm  -     Ambulatory Referral to Neurosurgery          I have spent a total time of 40 minutes on 04/26/23 in caring for this patient including Diagnostic results, Prognosis, Instructions for management, Patient and family education, Risk factor reductions, Impressions, Counseling / Coordination of care, Documenting in the medical record, Reviewing / ordering tests, medicine, procedures  , Obtaining or reviewing history   and Communicating with other healthcare professionals   CHIEF COMPLAINT    Chief Complaint   Patient presents with   • Consult       HISTORY    History of Present Illness     65y o  year old male     69-year-old gentleman seen for new evaluation of a 3 mm left A1/A2 aneurysm incidentally noted on evaluation of transient right leg weakness  He has a known asymptomatic left ICA occlusion for well over a decade with prior right carotid endarterectomy  An MRI was ordered in January 2023 for intermittent right leg weakness that he has had since heart surgery, which noted flow-void in the left carotid  CTA head and neck was then ordered for additional follow-up and the small aneurysm was found  He denies any family history of aneurysm or sudden death  Former smoker, quit after his heart attack in the 90s  Blood pressure well controlled  He has chronic left ear hearing loss for over 5 years, with recent right ear hearing loss from otitis media and a perforated eardrum, making history and exam difficult  Utilized notepad to dictate and patient to read and respond  Denies prior stroke or TIA  Reports occasional headaches  No unilateral extremity numbness or weakness  No loss of vision  He is able to ambulate with a walker, though is in a wheelchair for easier transport  No bowel or bladder incontinence    He is on chronic oxygen for COPD, uses CPAP, history of prostate cancer, CAD with MI and PCI, recent COVID/RSV infection  See Discussion    REVIEW OF SYSTEMS    Review of Systems   Constitutional: Positive for appetite change (almost half of appetite )  HENT: Positive for hearing loss (cant hear )  Negative for tinnitus  Eyes: Negative  Respiratory: Positive for apnea (uses Oxygen )  Uses Oxygen    Cardiovascular: Negative  Gastrointestinal: Negative  Endocrine: Negative  Genitourinary: Negative  Musculoskeletal: Positive for gait problem (uses wheel chair )  Negative for neck pain and neck stiffness  Skin: Negative  Neurological: Positive for speech difficulty, weakness (hands ) and headaches (sometimes )  Negative for dizziness, tremors, seizures, light-headedness and numbness  Hematological: Bruises/bleeds easily  Psychiatric/Behavioral: Positive for agitation (due to not being able to hear )  Negative for confusion, decreased concentration and sleep disturbance  ROS obtained by MA  Reviewed  See HPI  Meds/Allergies     Current Outpatient Medications   Medication Sig Dispense Refill   • ALPRAZolam (XANAX) 0 25 mg tablet Take 1 tablet (0 25 mg total) by mouth 2 (two) times a day as needed for anxiety 15 tablet 0   • aspirin 81 mg chewable tablet 81 mg daily at bedtime     • Blood Pressure Monitor KIT Use daily (Patient taking differently: Use 40 mg daily) 1 kit 0   • budesonide (PULMICORT) 0 5 mg/2 mL nebulizer solution Take 2 mL (0 5 mg total) by nebulization every 12 (twelve) hours Rinse mouth after use   120 mL 0   • dextromethorphan-guaiFENesin (ROBITUSSIN DM)  mg/5 mL syrup Take 5 mL by mouth 3 (three) times a day as needed for cough 354 mL 0   • famotidine (PEPCID) 20 mg tablet Take 1 tablet (20 mg total) by mouth daily at bedtime 100 tablet 1   • formoterol (PERFOROMIST) 20 MCG/2ML nebulizer solution Take 2 mL (20 mcg total) by nebulization every 12 (twelve) hours 120 mL 0   • furosemide (LASIX) 40 mg tablet Take 1 tablet (40 mg total) by mouth daily 100 tablet 3   • gabapentin (NEURONTIN) 300 mg capsule Take 1 capsule (300 mg total) by mouth 2 (two) times a day     • ipratropium-albuterol (DUO-NEB) 0 5-2 5 mg/3 mL nebulizer solution Take 3 mL by nebulization 3 (three) times a day 270 mL 0   • metoprolol succinate (TOPROL-XL) 25 mg 24 hr tablet Take 0 5 tablets (12 5 mg total) by mouth 2 (two) times a day 90 tablet 0   • oxygen gas Inhale 2 L/min continuous  2LPM at rest and 3LPM with activity per D Cedric FANG notes  Indications: SOB, pulmonary edema suspected     • potassium chloride (Klor-Con) 10 mEq tablet Take 2 tablets (20 mEq total) by mouth daily 200 tablet 3   • rosuvastatin (CRESTOR) 40 MG tablet TAKE 1 TABLET DAILY (Patient taking differently: Take 40 mg by mouth daily at bedtime) 100 tablet 3     No current facility-administered medications for this visit         Allergies   Allergen Reactions   • Lisinopril Swelling and Cough   • Tetanus Antitoxin Anaphylaxis   • Tetanus Toxoid Anaphylaxis and Swelling       PAST HISTORY    Past Medical History:   Diagnosis Date   • Bilateral carotid artery stenosis    • Cancer (HCC)     skin   • Chronic diastolic heart failure (HCC)    • Chronic ischemic heart disease    • Chronic kidney disease     stage 3   • Colon polyp    • COPD (chronic obstructive pulmonary disease) (HCC)    • Coronary artery disease     hx stents, MI, PCI   • COVID 11/2021   • CPAP (continuous positive airway pressure) dependence    • Emphysema of lung ([facility] [address] ) 1995    started   • Hearing loss    • History of transfusion 1995   • Hyperlipidemia    • Hypertension    • Intracranial aneurysm 4/25/2023   • Lung nodule 2023    x rays   • MI (myocardial infarction) ([address] ) 1995   • Myocardial infarction ([address] ) 1995   • Pneumonia    • Pneumothorax feb 20,2023    collapsed lung   • Prostate cancer ([address] )    • RSV (respiratory syncytial virus infection) 10/2022   • S/P carotid endarterectomy    • Shortness of breath     O2 2 l/nc PRN   • Sleep apnea    • Sleep apnea, obstructive    • Stented coronary artery        Past Surgical History:   Procedure Laterality Date   • APPENDECTOMY     • CARDIAC CATHETERIZATION  2021    left main with no significant disease, proximal LAD with 10% stenosis at the site of prior stent, left circumflex artery with minimal luminal irregularities mid RCA with 50% stenosis at site of prior stent and PL segment with distal disease supplied by collaterals from the distal circumflex with no significant CAD requiring revascularization at that time     • CATARACT EXTRACTION, BILATERAL Bilateral    • COLONOSCOPY     • CORONARY ANGIOPLASTY WITH STENT PLACEMENT      RCA   • CORONARY ANGIOPLASTY WITH STENT PLACEMENT      RCA   • CORONARY ANGIOPLASTY WITH STENT PLACEMENT      LAD   • EYE SURGERY     • W[place] BX PROSTATE STRTCTC SATURATION SAMPLING IMG GID N/A 2022    Procedure: TRANSPERINEAL MRI FUSION  BIOPSY PROSTATE;  Surgeon: Oneil Canchola MD;  Location: [facility];  Service: Urology   • W[place] NEUROPLASTY &/TRANSPOS MEDIAN NRV CARPAL [place] Left 2022    Procedure: RELEASE CARPAL TUNNEL;  Surgeon: Funmilayo Muniz MD;  Location: [facility];  Service: Orthopedics   • WY NEUROPLASTY &/TRANSPOSITION ULNAR NERVE ELBOW Left 2022    Procedure: RELEASE CUBITAL TUNNEL;  Surgeon: Funmilayo Muniz MD;  Location[facility];  Service: Orthopedics   • W[place] NEUROPLASTY &/TRANSPOSITION ULNAR NERVE WRIST Left 2022    Procedure: Ashkan Adams;  Surgeon: Funmilayo Muniz MD;  Location: [facility];  Service: Orthopedics   • SKIN CANCER EXCISION      chin-per pt, basal cell  also right ankle   • TONSILLECTOMY  no       Social History     Tobacco Use   • Smoking status: Former     Packs/day: 2 00     Years: 35 00     Pack years: 70 00     Types: Cigarettes     Start date: 1960     Quit date: 1995     Years since quittin 8   • Smokeless tobacco: Never   • Tobacco "comments:     stopped    Vaping Use   • Vaping Use: Never used   Substance Use Topics   • Alcohol use: Never   • Drug use: Never       Family History   Problem Relation Age of Onset   • Heart attack Mother    • Dementia Mother    • Heart failure Mother             • No Known Problems Father          Above history personally reviewed  EXAM    Vitals:Blood pressure 110/60, pulse 62, temperature 98 °F (36 7 °C), resp  rate 15, height 5' 8\" (1 727 m), weight 88 kg (194 lb), SpO2 (!) 88 %  ,Body mass index is 29 5 kg/m²  Physical Exam  Vitals reviewed  Constitutional:       General: He is awake  Appearance: Normal appearance  HENT:      Head: Normocephalic and atraumatic  Eyes:      Extraocular Movements: EOM normal       Conjunctiva/sclera: Conjunctivae normal       Pupils: Pupils are equal, round, and reactive to light  Cardiovascular:      Rate and Rhythm: Normal rate  Pulmonary:      Effort: Pulmonary effort is normal    Skin:     General: Skin is warm and dry  Neurological:      Mental Status: He is alert and oriented to person, place, and time  Deep Tendon Reflexes:      Reflex Scores:       Bicep reflexes are 2+ on the right side and 2+ on the left side  Brachioradialis reflexes are 2+ on the right side and 2+ on the left side  Patellar reflexes are 2+ on the right side and 2+ on the left side  Achilles reflexes are 2+ on the right side and 2+ on the left side  Psychiatric:         Attention and Perception: Attention and perception normal          Mood and Affect: Mood and affect normal          Speech: Speech normal          Behavior: Behavior normal  Behavior is cooperative  Thought Content: Thought content normal          Cognition and Memory: Cognition and memory normal          Judgment: Judgment normal          Neurologic Exam     Mental Status   Oriented to person, place, and time  Follows 2 step commands     Attention: normal  " Concentration: normal    Speech: speech is normal   Level of consciousness: alert  Knowledge: good  Able to perform simple calculations  Able to name object  Normal comprehension  Cranial Nerves     CN III, IV, VI   Pupils are equal, round, and reactive to light  Extraocular motions are normal    Right pupil: Shape: regular  Reactivity: brisk  Consensual response: intact  Left pupil: Shape: regular  Reactivity: brisk  Consensual response: intact  CN III: no CN III palsy  CN VI: no CN VI palsy  Nystagmus: none   Ophthalmoparesis: none  Upgaze: normal  Downgaze: normal  Conjugate gaze: present    CN V   Facial sensation intact  CN VII   Facial expression full, symmetric  CN VIII   Hearing: impaired    CN XI   Right trapezius strength: normal  Left trapezius strength: normal    CN XII   CN XII normal      Motor Exam   Muscle bulk: normal  Overall muscle tone: normal  BUE - 5/5  RLE - HF 4/5, otherwise 5/5  LLE - 5/5     Sensory Exam   Light touch normal      Gait, Coordination, and Reflexes     Tremor   Resting tremor: absent  Intention tremor: absent    Reflexes   Right brachioradialis: 2+  Left brachioradialis: 2+  Right biceps: 2+  Left biceps: 2+  Right patellar: 2+  Left patellar: 2+  Right achilles: 2+  Left achilles: 2+  Right Mcmahan: absent  Left Mcmahan: absent  Right ankle clonus: absent  Left ankle clonus: absent  In wheelchair         MEDICAL DECISION MAKING    Imaging Studies:     X-ray chest 1 view portable    Result Date: 4/3/2023  Narrative: CHEST INDICATION:   chest pain  COMPARISON:  CXR 3/28/2023 and chest CT 04/03/2023  EXAM PERFORMED/VIEWS:  XR CHEST PORTABLE  FINDINGS: Cardiomediastinal silhouette normal  Clips in the right neck  Severe emphysema  Right greater than left bibasilar pneumonia  No pneumothorax  Upper abdomen normal  Bones normal for age  Impression: Severe emphysema with right greater than left bibasilar pneumonia   Workstation performed: [id number]     XR chest 1 view portable    Result Date: 3/28/2023  Narrative: CHEST INDICATION:   sob  COMPARISON:  CXR 3/15/2023 and chest CT 2/22/2023  EXAM PERFORMED/VIEWS:  XR CHEST PORTABLE  FINDINGS: Cardiomediastinal silhouette normal  Clips in the right neck  Persistent bibasilar pneumonia, greater on the right  Severe emphysema  No pneumothorax  Upper abdomen normal  Bones normal for age  Impression: Severe emphysema with persistent bibasilar pneumonia, greater on the right  Workstation performed: [id number]     CT orbits/temporal bones/skull base wo contrast    Result Date: 4/5/2023  Narrative: CT TEMPORAL BONES WITHOUT CONTRAST INDICATION:   Hearing loss, mixed acute hearing loss  COMPARISON:  None  TECHNIQUE: Using a multi-detector scanner, 0 625 mm axial scans of the temporal bone were acquired using a high-resolution bone technique  Targeted reconstructions were obtained of each side, including axial, coronal, poschl and stenvers views  All reconstructed images were reviewed  Soft tissue reconstructions were performed as well  Radiation dose length product (DLP) for this visit:  755 77 mGy-cm   This examination, like all CT scans performed in the [facility], was performed utilizing techniques to minimize radiation dose exposure, including the use of iterative  reconstruction and automated exposure control  IV Contrast: None IMAGE QUALITY:  Diagnostic  FINDINGS: RIGHT TEMPORAL BONE: PERIAURICULAR SOFT TISSUES:  Normal  EXTERNAL AUDITORY CANAL: Normal  MIDDLE EAR: Normal  OSSICLES:Normal  MASTOID AIR CELLS: Normal  COCHLEA: Normal  OTIC CAPSULE: Normal mineralization   VESTIBULE: Normal  VESTIBULAR AND COCHLEAR AQUEDUCT: Normal SEMICIRCULAR CANALS: Normal  INTERNAL AUDITORY CANAL: Normal  FACIAL NERVE CANAL: Normal  CAROTID CANAL: Normal  JUGULAR FORAMEN: Normal  LEFT TEMPORAL BONE: PERIAURICULAR SOFT TISSUES:  Normal  EXTERNAL AUDITORY CANAL: Normal  MIDDLE EAR: Normal  OSSICLES:Normal  MASTOID AIR CELLS: Normal  COCHLEA: Normal  OTIC CAPSULE: Normal mineralization  VESTIBULE: Normal  VESTIBULAR AND COCHLEAR AQUEDUCT: Normal SEMICIRCULAR CANALS: Normal  INTERNAL AUDITORY CANAL: Normal  FACIAL NERVE CANAL: Normal  CAROTID CANAL: Normal  JUGULAR FORAMEN: Normal  OTHER FINDINGS:  Subtle thickening of the maxillary sinuses  No air-fluid levels  Vascular calcification of the distal vertebral arteries and cavernous internal carotid arteries  Impression: Unremarkable CT of the temporal bones  Workstation performed: [id number]     XR chest portable ICU    Result Date: 4/4/2023  Narrative: CHEST INDICATION:   hypoxia  COMPARISON:  4/3/2023 EXAM PERFORMED/VIEWS:  XR CHEST PORTABLE ICU FINDINGS: Cardiomediastinal silhouette appears unremarkable  There is stable right greater than left patchy bibasilar opacity  There are emphysematous changes most severe in the right upper lobe  Osseous structures appear within normal limits for patient age  Impression: Stable bibasilar patchy opacity, right greater than left, likely pneumonia  Workstation performed: [id number]     CTA ED chest PE Study    Result Date: 4/3/2023  Narrative: CTA - CHEST WITH IV CONTRAST - PULMONARY ANGIOGRAM INDICATION:   tachycardia hypoxia  Per my review of the medical record, the patient is a former smoker  History of prostate cancer  COMPARISON: CXR from today and chest CT from 2/22/2023, 2/28/2022, and 2/5/2021  TECHNIQUE: CT angiogram timed for optimal opacification of the pulmonary arteries  Axial, sagittal, and coronal 2D reformats created from source data  Coronal 3D MIP postprocessing on the acquisition scanner  Radiation dose length product (DLP):  500 12 mGy-cm   Radiation dose exposure minimized using iterative reconstruction and automated exposure control  IV Contrast:  100 mL of iohexol (OMNIPAQUE)  FINDINGS: PULMONARY ARTERIES:  No pulmonary embolus  LUNGS:  Worsening consolidation in the dependent right lower lobe  Multiple new irregular nodules and foci of tree-in-bud nodularity with a mid and lower lung predominance  Severe upper lobe predominant panlobular emphysema  Benign calcified granulomas  AIRWAYS: No significant filling defects  Mild diffuse bronchial wall thickening  PLEURA:  Unremarkable  HEART/GREAT VESSELS:  Normal for age  MEDIASTINUM AND ZION:  Unremarkable  CHEST WALL AND LOWER NECK: Gynecomastia  Subcentimeter left thyroid nodule  No follow-up needed  UPPER ABDOMEN:  Tiny calcified gallstone  Subcentimeter hepatic cyst   Left renal cysts  Colonic diverticuli  OSSEOUS STRUCTURES:  Moderate degenerative disease in the spine with mild dextrocurvature  Old right rib fractures  Impression: No pulmonary embolus  Worsening right lower lobe consolidation with multiple new irregular nodules and tree-in-bud nodularity with a mid and lower lung predominance due to pneumonia  The distribution is suggestive of aspiration  Workstation performed: [id number]       I have personally reviewed pertinent reports     and I have personally reviewed pertinent films in PACS

## 2023-04-27 ENCOUNTER — DOCUMENTATION (OUTPATIENT)
Dept: HEMATOLOGY ONCOLOGY | Facility: CLINIC | Age: 78
End: 2023-04-27

## 2023-04-27 ENCOUNTER — OFFICE VISIT (OUTPATIENT)
Dept: CARDIOLOGY CLINIC | Facility: CLINIC | Age: 78
End: 2023-04-27

## 2023-04-27 VITALS
HEIGHT: 68 IN | HEART RATE: 80 BPM | OXYGEN SATURATION: 87 % | DIASTOLIC BLOOD PRESSURE: 52 MMHG | SYSTOLIC BLOOD PRESSURE: 100 MMHG | WEIGHT: 194 LBS | BODY MASS INDEX: 29.4 KG/M2

## 2023-04-27 DIAGNOSIS — I35.0 AORTIC VALVE STENOSIS, ETIOLOGY OF CARDIAC VALVE DISEASE UNSPECIFIED: ICD-10-CM

## 2023-04-27 DIAGNOSIS — I25.10 CORONARY ARTERY DISEASE INVOLVING NATIVE CORONARY ARTERY OF NATIVE HEART, UNSPECIFIED WHETHER ANGINA PRESENT: ICD-10-CM

## 2023-04-27 DIAGNOSIS — I50.20 HFREF (HEART FAILURE WITH REDUCED EJECTION FRACTION) (HCC): Primary | ICD-10-CM

## 2023-04-27 DIAGNOSIS — E78.5 HYPERLIPIDEMIA, UNSPECIFIED HYPERLIPIDEMIA TYPE: ICD-10-CM

## 2023-04-27 NOTE — PROGRESS NOTES
Cardiology Follow Up     Johny Santos  [id number]  1945  [facility] [address] [address]      1  HFrEF (heart failure with reduced ejection fraction) ([facility] [address] )        2  Coronary artery disease involving native coronary artery of native heart, unspecified whether angina present        3  Hyperlipidemia, unspecified hyperlipidemia type        4  Aortic valve stenosis, etiology of cardiac valve disease unspecified            Discussion/Summary:  1  Dyspnea on exertion-improving since last hospitalization but still significant requiring nasal cannula oxygen at baseline with his COPD  2  Ischemic cardiomyopathy LVEF 40% on transthoracic echocardiogram February 2023  3  Hypertension  4  Hyperlipidemia  5  COPD now on 4 L nasal cannula oxygen at baseline  6  Aortic stenosis  7    Mitral regurgitation    -Transthoracic echocardiogram 2/3/2023 showing left ventricular systolic function moderately reduced estimated LVEF 40% with global hypokinesis and mild to moderately dilated left atrium with mild aortic regurgitation and mild aortic stenosis with moderate mitral regurgitation and mild tricuspid regurgitation  -Patient will continue metoprolol succinate 12 5 mg twice daily along with Crestor 40 mg daily and aspirin 81 mg daily along with furosemide 40 mg daily   -Patient will continue fluid and salt restricted diet to less than 1800 mg of sodium daily and less than 1800 mL of fluid daily along with elevation in legs and use of compression stockings  -Patient had documentation of swelling and cough with lisinopril swelling would therefore preclude use of ACE inhibitors for patient blood pressure is soft at this time and would preclude up titration   -If patient were to have recurrence of chest pain or worsening of symptoms would likely undergo cardiac catheterization at that time however would need to have significant discussion prior to this as to whether patient would even want to undergo invasive or aggressive procedures   -I will see patient in 3 months or sooner if necessary  -Patient counseled if he were to have any warning or alarm type symptoms he is to seek emergency medical care immediately   -Patient does remain high risk for possible urologic procedure and I am unsure if he even wishes to still undergo this at this time if he does we can discuss potential options in terms of things to hopefully help reduce risk of surgeries however he would need to be cleared by pulmonology as well as at this time that is one of the most significant factors and risks in regards to anesthesia for the patient as he has been hospitalized multiple times for lung issues just over the last couple months  History of Present Illness:  -Patient is a 71-year-old male with hypertension, hyperlipidemia, obesity, COPD with chronic nasal cannula oxygen now up to 4 L after most recent hospitalization with chronic antibiotic therapy as well as obstructive sleep apnea compliant with CPAP therapy, history of recent iatrogenic pneumothorax, history of coronary artery disease with prior MI in 1995 with PCI to RCA, stenting in 1999 and 2001 involving RCA then again in 2003 with stenting to LAD with catheterization in March 2021 showing nonobstructive coronary artery disease with no need for revascularization at that time along with asymptomatic PVCs hospitalized multiple times since January 2023 for COVID-19 and now most recently in April 2023 for right lower lobe pneumonia versus pneumonitis and COPD exacerbation treated with IV Solu-Medrol by pulmonology recommendations and uptitrated on nasal cannula oxygen who presents to the office today with wife and daughter for hospital follow-up    -During hospitalization patient's hearing almost completely vanished and is still significantly diminished and they are currently undergoing work-up for this  He denies any chest pain, palpitations, lightheadedness or dizziness, loss of consciousness  He notes that shortness of breath is about his baseline from when he was discharged from the hospital and while he had worsening lower extremity edema when he first got out this has been improving with diuretic therapy  He does note during the day when his legs are down lower extremity edema returns but at night with elevation this improves and he does have good urine output with diuretic therapy but at the initiation of stream does have some burning and will be speaking with his primary care physician today about this      Patient Active Problem List   Diagnosis   • Hyperlipidemia   • Coronary artery disease involving native coronary artery of native heart without angina pectoris   • Left carotid artery occlusion   • Hypertension   • Chronic obstructive pulmonary disease with acute exacerbation (HCC)   • Oxygen dependent   • Depression, recurrent (HCC)   • S/P carotid endarterectomy   • Stented coronary artery   • Vertigo   • Anisometropia   • Osteoarthritis of spinal facet joint   • Chronic ischemic heart disease   • Chronic diastolic (congestive) heart failure (HCC)   • Diverticular disease of colon   • Dry eye syndrome   • GERD (gastroesophageal reflux disease)   • Acute hearing loss, right   • Elevated PSA   • Hypoxia   • Lens replaced by other means   • Lumbar radiculopathy   • Multinodular goiter   • Nuclear senile cataract   • Obesity   • Occlusion and stenosis of carotid artery   • Osteoarthritis of hip   • Prediabetes   • Psychosexual dysfunction with inhibited sexual excitement   • LAMBERTO on CPAP   • Solitary pulmonary nodule   • Thyroid nodule   • Guyon syndrome, left   • Costochondral chest pain   • Chest pain   • Abnormal nuclear stress test   • Right hip pain   • Primary insomnia   • Chronic right-sided thoracic back pain   • Stage 3a chronic kidney disease ([facility] [address] )   • Hoarseness or changing voice   • Chronic bilateral low back pain with right-sided sciatica   • Mid back pain   • Thoracic radiculopathy   • Chronic pain syndrome   • Urinary frequency   • Incomplete bladder emptying   • Intercostal neuralgia   • Cubital tunnel syndrome on left   • Carpal tunnel syndrome on left   • Elevated MCV   • Prostate cancer (HCC)   • Acute on chronic respiratory failure with hypoxia (HCC)   • Obstructive sleep apnea syndrome in adult   • Sensorineural hearing loss, bilateral   • RSV (respiratory syncytial virus infection)   • Right lower quadrant abdominal pain   • COVID   • Ambulatory dysfunction   • COPD (chronic obstructive pulmonary disease) ([facility])   • Hypokalemia   • Left leg pain   • Transient right leg weakness   • Pneumothorax on right   • Leukocytosis   • Orthopnea   • Lower extremity edema   • Irregular heart rhythm   • Sepsis due to pneumonia [facility])   • Elevated troponin   • Intracranial aneurysm     Past Medical History:   Diagnosis Date   • Bilateral carotid artery stenosis    • Cancer (HCC)     skin   • Chronic diastolic heart failure (HCC)    • Chronic ischemic heart disease    • Chronic kidney disease     stage 3   • Colon polyp    • COPD (chronic obstructive pulmonary disease) ([facility])    • Coronary artery disease     hx stents, MI, PCI   • COVID 11/2021   • CPAP (continuous positive airway pressure) dependence    • Emphysema of lung ([facility] ) 1995    started   • Hearing loss    • History of transfusion 1995   • Hyperlipidemia    • Hypertension    • Intracranial aneurysm 4/25/2023   • Lung nodule 2023    x rays   • MI (myocardial infarction) ([facility] ) 1995   • Myocardial infarction ([facility] ) 1995   • Pneumonia    • Pneumothorax feb 20,2023    collapsed lung   • Prostate cancer ([facility] )    • RSV (respiratory syncytial virus infection) 10/2022   • S/P carotid endarterectomy    • Shortness of breath     O2 2 l/nc PRN   • Sleep apnea    • Sleep apnea, obstructive • Stented coronary artery      Social History     Socioeconomic History   • Marital status: /Civil Union     Spouse name: Not on file   • Number of children: Not on file   • Years of education: Not on file   • Highest education level: Not on file   Occupational History   • Not on file   Tobacco Use   • Smoking status: Former     Packs/day: 2 00     Years: 35 00     Pack years: 70 00     Types: Cigarettes     Start date: 1960     Quit date: 1995     Years since quittin 8   • Smokeless tobacco: Never   • Tobacco comments:     stopped    Vaping Use   • Vaping Use: Never used   Substance and Sexual Activity   • Alcohol use: Never   • Drug use: Never   • Sexual activity: Not Currently     Partners: Female     Birth control/protection: None   Other Topics Concern   • Not on file   Social History Narrative   • Not on file     Social Determinants of Health     Financial Resource Strain: Not on file   Food Insecurity: No Food Insecurity   • Worried About Running Out of Food in the Last Year: Never true   • Ran Out of Food in the Last Year: Never true   Transportation Needs: No Transportation Needs   • Lack of Transportation (Medical): No   • Lack of Transportation (Non-Medical):  No   Physical Activity: Not on file   Stress: Not on file   Social Connections: Not on file   Intimate Partner Violence: Not on file   Housing Stability: Low Risk    • Unable to Pay for Housing in the Last Year: No   • Number of Places Lived in the Last Year: 1   • Unstable Housing in the Last Year: No      Family History   Problem Relation Age of Onset   • Heart attack Mother    • Dementia Mother    • Heart failure Mother             • No Known Problems Father      Past Surgical History:   Procedure Laterality Date   • APPENDECTOMY     • CARDIAC CATHETERIZATION  2021    left main with no significant disease, proximal LAD with 10% stenosis at the site of prior stent, left circumflex artery with minimal luminal irregularities mid RCA with 50% stenosis at site of prior stent and PL segment with distal disease supplied by collaterals from the distal circumflex with no significant CAD requiring revascularization at that time     • CATARACT EXTRACTION, BILATERAL Bilateral    • COLONOSCOPY     • CORONARY ANGIOPLASTY WITH STENT PLACEMENT  1999    RCA   • CORONARY ANGIOPLASTY WITH STENT PLACEMENT  2001    RCA   • CORONARY ANGIOPLASTY WITH STENT PLACEMENT  2003    LAD   • EYE SURGERY     • DC BX PROSTATE STRTCTC SATURATION SAMPLING IMG GID N/A 09/13/2022    Procedure: TRANSPERINEAL MRI FUSION  BIOPSY PROSTATE;  Surgeon: Pj Beltre MD;  Location: [facility];  Service: Urology   • DC NEUROPLASTY &/TRANSPOS MEDIAN NRV Lito Landa Left 07/22/2022    Procedure: RELEASE CARPAL TUNNEL;  Surgeon: Dale Jacobo MD;  Location: [facility];  Service: Orthopedics   • DC NEUROPLASTY &/TRANSPOSITION ULNAR NERVE ELBOW Left 07/22/2022    Procedure: RELEASE CUBITAL TUNNEL;  Surgeon: Dale Jacobo MD;  Location: [facility];  Service: Orthopedics   • DC NEUROPLASTY &/TRANSPOSITION ULNAR NERVE WRIST Left 07/22/2022    Procedure: Candy Gallop RELEASE;  Surgeon: Dale Jacobo MD;  Location: [facility];  Service: Orthopedics   • SKIN CANCER EXCISION  2012    chin-per pt, basal cell  also right ankle   • TONSILLECTOMY  no       Current Outpatient Medications:   •  ALPRAZolam (XANAX) 0 25 mg tablet, Take 1 tablet (0 25 mg total) by mouth 2 (two) times a day as needed for anxiety, Disp: 15 tablet, Rfl: 0  •  aspirin 81 mg chewable tablet, 81 mg daily at bedtime, Disp: , Rfl:   •  budesonide (PULMICORT) 0 5 mg/2 mL nebulizer solution, Take 2 mL (0 5 mg total) by nebulization every 12 (twelve) hours Rinse mouth after use , Disp: 120 mL, Rfl: 0  •  dextromethorphan-guaiFENesin (ROBITUSSIN DM)  mg/5 mL syrup, Take 5 mL by mouth 3 (three) times a day as needed for cough, Disp: 354 mL, Rfl: 0  •  famotidine (PEPCID) 20 mg tablet, Take 1 tablet (20 mg total) by mouth daily at bedtime, Disp: 100 tablet, Rfl: 1  •  formoterol (PERFOROMIST) 20 MCG/2ML nebulizer solution, Take 2 mL (20 mcg total) by nebulization every 12 (twelve) hours, Disp: 120 mL, Rfl: 0  •  furosemide (LASIX) 40 mg tablet, Take 1 tablet (40 mg total) by mouth daily, Disp: 100 tablet, Rfl: 3  •  gabapentin (NEURONTIN) 300 mg capsule, Take 1 capsule (300 mg total) by mouth 2 (two) times a day, Disp: , Rfl:   •  ipratropium-albuterol (DUO-NEB) 0 5-2 5 mg/3 mL nebulizer solution, Take 3 mL by nebulization 3 (three) times a day, Disp: 270 mL, Rfl: 0  •  metoprolol succinate (TOPROL-XL) 25 mg 24 hr tablet, Take 0 5 tablets (12 5 mg total) by mouth 2 (two) times a day, Disp: 90 tablet, Rfl: 0  •  oxygen gas, Inhale 2 L/min continuous  2LPM at rest and 3LPM with activity per ROXANNE FANG notes  Indications: SOB, pulmonary edema suspected, Disp: , Rfl:   •  potassium chloride (Klor-Con) 10 mEq tablet, Take 2 tablets (20 mEq total) by mouth daily, Disp: 200 tablet, Rfl: 3  •  rosuvastatin (CRESTOR) 40 MG tablet, TAKE 1 TABLET DAILY (Patient taking differently: Take 40 mg by mouth daily at bedtime), Disp: 100 tablet, Rfl: 3  •  Blood Pressure Monitor KIT, Use daily (Patient taking differently: Use 40 mg daily), Disp: 1 kit, Rfl: 0  •  predniSONE 20 mg tablet, 3 tabs by mouth days 1-5, 2 tabs by mouth days 6-10, 1 tab by mouth days 11-15 (Patient not taking: Reported on 4/27/2023), Disp: 30 tablet, Rfl: 0  Allergies   Allergen Reactions   • Lisinopril Swelling and Cough   • Tetanus Antitoxin Anaphylaxis   • Tetanus Toxoid Anaphylaxis and Swelling         Labs:  No results displayed because visit has over 200 results        Admission on 03/28/2023, Discharged on 03/31/2023   Component Date Value   • WBC 03/28/2023 15 62 (H)    • RBC 03/28/2023 4 54    • Hemoglobin 03/28/2023 14 5    • Hematocrit 03/28/2023 47 0    • MCV 03/28/2023 104 (H)    • MCH 03/28/2023 31 9    • MCHC 03/28/2023 30 9 (L) • RDW 03/28/2023 14 1    • MPV 03/28/2023 9 6    • Platelets 93/32/2016 306    • nRBC 03/28/2023 0    • Neutrophils Relative 03/28/2023 80 (H)    • Immat GRANS % 03/28/2023 2    • Lymphocytes Relative 03/28/2023 7 (L)    • Monocytes Relative 03/28/2023 9    • Eosinophils Relative 03/28/2023 1    • Basophils Relative 03/28/2023 1    • Neutrophils Absolute 03/28/2023 12 80 (H)    • Immature Grans Absolute 03/28/2023 0 28 (H)    • Lymphocytes Absolute 03/28/2023 1 01    • Monocytes Absolute 03/28/2023 1 32 (H)    • Eosinophils Absolute 03/28/2023 0 11    • Basophils Absolute 03/28/2023 0 10    • Sodium 03/28/2023 138    • Potassium 03/28/2023 4 1    • Chloride 03/28/2023 96    • CO2 03/28/2023 37 (H)    • ANION GAP 03/28/2023 5    • BUN 03/28/2023 15    • Creatinine 03/28/2023 1 07    • Glucose 03/28/2023 112    • Calcium 03/28/2023 9 0    • AST 03/28/2023 21    • ALT 03/28/2023 20    • Alkaline Phosphatase 03/28/2023 60    • Total Protein 03/28/2023 6 1 (L)    • Albumin 03/28/2023 3 5    • Total Bilirubin 03/28/2023 0 59    • eGFR 03/28/2023 66    • hs TnI 0hr 03/28/2023 23    • SARS-CoV-2 03/28/2023 Negative    • INFLUENZA A PCR 03/28/2023 Negative    • INFLUENZA B PCR 03/28/2023 Negative    • RSV PCR 03/28/2023 Negative    • LACTIC ACID 03/28/2023 1 3    • Procalcitonin 03/28/2023 0 12    • PTT 03/28/2023 26    • Blood Culture 03/28/2023 No Growth After 5 Days  • Blood Culture 03/28/2023 No Growth After 5 Days      • Protime 03/28/2023 14 2    • INR 03/28/2023 1 10    • hs TnI 2hr 03/28/2023 21    • Delta 2hr hsTnI 03/28/2023 -2    • hs TnI 4hr 03/28/2023 19    • Delta 4hr hsTnI 03/28/2023 -4    • Procalcitonin 03/29/2023 0 09    • WBC 03/29/2023 15 73 (H)    • RBC 03/29/2023 4 21    • Hemoglobin 03/29/2023 13 5    • Hematocrit 03/29/2023 43 1    • MCV 03/29/2023 102 (H)    • MCH 03/29/2023 32 1    • MCHC 03/29/2023 31 3 (L)    • RDW 03/29/2023 13 7    • Platelets 69/09/9259 297    • MPV 03/29/2023 10 1    • Sodium 03/29/2023 138    • Potassium 03/29/2023 4 6    • Chloride 03/29/2023 101    • CO2 03/29/2023 32    • ANION GAP 03/29/2023 5    • BUN 03/29/2023 16    • Creatinine 03/29/2023 0 94    • Glucose 03/29/2023 143 (H)    • Calcium 03/29/2023 8 8    • eGFR 03/29/2023 77    • WBC 03/30/2023 18 17 (H)    • RBC 03/30/2023 4 39    • Hemoglobin 03/30/2023 14 0    • Hematocrit 03/30/2023 44 4    • MCV 03/30/2023 101 (H)    • MCH 03/30/2023 31 9    • MCHC 03/30/2023 31 5    • RDW 03/30/2023 13 7    • Platelets 34/08/2285 298    • MPV 03/30/2023 10 0    • Sodium 03/30/2023 139    • Potassium 03/30/2023 4 4    • Chloride 03/30/2023 102    • CO2 03/30/2023 31    • ANION GAP 03/30/2023 6    • BUN 03/30/2023 20    • Creatinine 03/30/2023 0 94    • Glucose 03/30/2023 156 (H)    • Calcium 03/30/2023 8 5    • eGFR 03/30/2023 77    • Procalcitonin 03/30/2023 0 10    • Ventricular Rate 03/28/2023 88    • Atrial Rate 03/28/2023 88    • ID Interval 03/28/2023 152    • QRSD Interval 03/28/2023 92    • QT Interval 03/28/2023 386    • QTC Interval 03/28/2023 467    • P Axis 03/28/2023 78    • QRS Axis 03/28/2023 65    • T Wave Axis 03/28/2023 68    • Ventricular Rate 03/28/2023 89    • Atrial Rate 03/28/2023 89    • ID Interval 03/28/2023 156    • QRSD Interval 03/28/2023 90    • QT Interval 03/28/2023 376    • QTC Interval 03/28/2023 457    • P Axis 03/28/2023 71    • QRS Axis 03/28/2023 59    • T Wave Axis 03/28/2023 79    • hs TnI 0hr 03/31/2023 21    • hs TnI 2hr 03/31/2023 20    • Delta 2hr hsTnI 03/31/2023 -1    • WBC 03/31/2023 25 51 (H)    • RBC 03/31/2023 4 21    • Hemoglobin 03/31/2023 13 4    • Hematocrit 03/31/2023 42 8    • MCV 03/31/2023 102 (H)    • MCH 03/31/2023 31 8    • MCHC 03/31/2023 31 3 (L)    • RDW 03/31/2023 13 9    • Platelets 30/54/7566 303    • MPV 03/31/2023 10 3    • Sodium 03/31/2023 136    • Potassium 03/31/2023 4 5    • Chloride 03/31/2023 99    • CO2 03/31/2023 33 (H)    • ANION GAP 03/31/2023 4    • BUN 03/31/2023 27 (H)    • Creatinine 03/31/2023 1 09    • Glucose 03/31/2023 155 (H)    • Calcium 03/31/2023 8 2 (L)    • eGFR 03/31/2023 64    • hs TnI 4hr 03/31/2023 19    • Delta 4hr hsTnI 03/31/2023 -2    • Ventricular Rate 03/31/2023 85    • Atrial Rate 03/31/2023 85    • TX Interval 03/31/2023 150    • QRSD Interval 03/31/2023 92    • QT Interval 03/31/2023 384    • QTC Interval 03/31/2023 456    • P Axis 03/31/2023 75    • QRS Axis 03/31/2023 65    • T Wave Axis 03/31/2023 40    • Supplier Name 03/31/2023 [employer]    • Supplier Phone Number 03/31/2023 [phone]    • Order Status 03/31/2023 Delivery Successful    • Delivery Note 03/31/2023 Will provide from consignment      • Delivery Request Date 03/31/2023 03/31/2023    • Date Delivered  03/31/2023 03/31/2023    • Item Description 03/31/2023 Nebulizer Compressor with Mouthpiece Only    • Item Description 03/31/2023 Nebulizer Set, Reusable    • Item Description 03/31/2023 Mouthpiece Only, N/A    Appointment on 03/24/2023   Component Date Value   • WBC 03/24/2023 20 96 (H)    • RBC 03/24/2023 5 00    • Hemoglobin 03/24/2023 16 0    • Hematocrit 03/24/2023 50 8 (H)    • MCV 03/24/2023 102 (H)    • MCH 03/24/2023 32 0    • MCHC 03/24/2023 31 5    • RDW 03/24/2023 14 0    • MPV 03/24/2023 9 9    • Platelets 34/49/9229 451 (H)    • Sodium 03/24/2023 137    • Potassium 03/24/2023 3 8    • Chloride 03/24/2023 99    • CO2 03/24/2023 37 (H)    • ANION GAP 03/24/2023 1 (L)    • BUN 03/24/2023 22    • Creatinine 03/24/2023 1 15    • Glucose, Fasting 03/24/2023 82    • Calcium 03/24/2023 9 3    • Corrected Calcium 03/24/2023 9 9    • AST 03/24/2023 29    • ALT 03/24/2023 59    • Alkaline Phosphatase 03/24/2023 79    • Total Protein 03/24/2023 7 0    • Albumin 03/24/2023 3 2 (L)    • Total Bilirubin 03/24/2023 0 52    • eGFR 03/24/2023 60    • Segmented % 03/24/2023 70    • Bands % 03/24/2023 10 (H)    • Lymphocytes % 03/24/2023 3 (L)    • Monocytes % 03/24/2023 10    • Eosinophils, % 03/24/2023 2    • Basophils % 03/24/2023 0    • Promyelocytes % 03/24/2023 1 (H)    • Atypical Lymphocytes % 03/24/2023 4 (H)    • Absolute Neutrophils 03/24/2023 16 77 (H)    • Lymphocytes Absolute 03/24/2023 0 63    • Monocytes Absolute 03/24/2023 2 10 (H)    • Eosinophils Absolute 03/24/2023 0 42 (H)    • Basophils Absolute 03/24/2023 0 00    • RBC Morphology 03/24/2023 Present    • Polychromasia 03/24/2023 Present    • Platelet Estimate 19/78/0863 Adequate    • Giant PLTs 03/24/2023 Present    Admission on 03/15/2023, Discharged on 03/17/2023   Component Date Value   • WBC 03/15/2023 15 85 (H)    • RBC 03/15/2023 4 93    • Hemoglobin 03/15/2023 15 8    • Hematocrit 03/15/2023 50 7 (H)    • MCV 03/15/2023 103 (H)    • MCH 03/15/2023 32 0    • MCHC 03/15/2023 31 2 (L)    • RDW 03/15/2023 13 3    • MPV 03/15/2023 10 0    • Platelets 22/97/4977 275    • nRBC 03/15/2023 0    • Neutrophils Relative 03/15/2023 81 (H)    • Immat GRANS % 03/15/2023 0    • Lymphocytes Relative 03/15/2023 6 (L)    • Monocytes Relative 03/15/2023 12    • Eosinophils Relative 03/15/2023 1    • Basophils Relative 03/15/2023 0    • Neutrophils Absolute 03/15/2023 12 67 (H)    • Immature Grans Absolute 03/15/2023 0 06    • Lymphocytes Absolute 03/15/2023 0 95    • Monocytes Absolute 03/15/2023 1 97 (H)    • Eosinophils Absolute 03/15/2023 0 15    • Basophils Absolute 03/15/2023 0 05    • Sodium 03/15/2023 140    • Potassium 03/15/2023 4 0    • Chloride 03/15/2023 95 (L)    • CO2 03/15/2023 31    • ANION GAP 03/15/2023 14 (H)    • BUN 03/15/2023 13    • Creatinine 03/15/2023 1 13    • Glucose 03/15/2023 124    • Calcium 03/15/2023 9 7    • AST 03/15/2023 30    • ALT 03/15/2023 31    • Alkaline Phosphatase 03/15/2023 75    • Total Protein 03/15/2023 7 5    • Albumin 03/15/2023 3 8    • Total Bilirubin 03/15/2023 0 65    • eGFR 03/15/2023 61    • LACTIC ACID 03/15/2023 3 6 (HH)    • Procalcitonin 03/15/2023 0 17    • Protime 03/15/2023 13 5    • INR 03/15/2023 1 03    • PTT 03/15/2023 28    • Blood Culture 03/15/2023 No Growth After 5 Days  • Blood Culture 03/15/2023 No Growth After 5 Days      • Color, UA 03/15/2023 Yellow    • Clarity, UA 03/15/2023 Clear    • Specific Gravity, UA 03/15/2023 1 010    • pH, UA 03/15/2023 6 0    • Leukocytes, UA 03/15/2023 Negative    • Nitrite, UA 03/15/2023 Negative    • Protein, UA 03/15/2023 Negative    • Glucose, UA 03/15/2023 Negative    • Ketones, UA 03/15/2023 Negative    • Urobilinogen, UA 03/15/2023 0 2    • Bilirubin, UA 03/15/2023 Negative    • Occult Blood, UA 03/15/2023 Trace-Intact (A)    • SARS-CoV-2 03/15/2023 Negative    • INFLUENZA A PCR 03/15/2023 Negative    • INFLUENZA B PCR 03/15/2023 Negative    • RSV PCR 03/15/2023 Negative    • BNP 03/15/2023 114 (H)    • hs TnI 0hr 03/15/2023 27    • Ventricular Rate 03/15/2023 143    • Atrial Rate 03/15/2023 143    • AR Interval 03/15/2023 140    • QRSD Interval 03/15/2023 84    • QT Interval 03/15/2023 270    • QTC Interval 03/15/2023 416    • P Axis 03/15/2023 75    • QRS Axis 03/15/2023 76    • T Wave Axis 03/15/2023 64    • hs TnI 2hr 03/15/2023 40    • Delta 2hr hsTnI 03/15/2023 13    • Sputum Culture 03/15/2023 3+ Growth of    • Gram Stain Result 03/15/2023 Rare Epithelial cells per low power field (A)    • Gram Stain Result 03/15/2023 No polys seen (A)    • Gram Stain Result 03/15/2023 2+ Gram positive cocci in pairs (A)    • Gram Stain Result 03/15/2023 2+ Gram positive rods (A)    • Gram Stain Result 03/15/2023 2+ Gram negative rods (A)    • Strep pneumoniae antigen* 03/15/2023 Negative    • Legionella Urinary Antig* 03/15/2023 Negative    • LACTIC ACID 03/15/2023 1 3    • MRSA Culture Only 03/15/2023 No Methicillin Resistant Staphlyococcus aureus (MRSA) isolated    • hs TnI 4hr 03/15/2023 41    • Delta 4hr hsTnI 03/15/2023 14    • RBC, UA 03/15/2023 0-1    • WBC, UA 03/15/2023 0-1    • Epithelial Cells 03/15/2023 Occasional    • Bacteria, UA 03/15/2023 Occasional    • Hyaline Casts, UA 03/15/2023 0-1 (A)    • Fine granular casts 03/15/2023 0-1    • Procalcitonin 03/16/2023 0 51 (H)    • WBC 03/16/2023 11 58 (H)    • RBC 03/16/2023 4 01    • Hemoglobin 03/16/2023 13 1    • Hematocrit 03/16/2023 41 5    • MCV 03/16/2023 104 (H)    • MCH 03/16/2023 32 7    • MCHC 03/16/2023 31 6    • RDW 03/16/2023 13 4    • Platelets 01/57/9507 202    • MPV 03/16/2023 10 0    • Sodium 03/16/2023 139    • Potassium 03/16/2023 3 3 (L)    • Chloride 03/16/2023 101    • CO2 03/16/2023 31    • ANION GAP 03/16/2023 7    • BUN 03/16/2023 12    • Creatinine 03/16/2023 0 99    • Glucose 03/16/2023 89    • Calcium 03/16/2023 8 3 (L)    • Corrected Calcium 03/16/2023 9 0    • AST 03/16/2023 28    • ALT 03/16/2023 30    • Alkaline Phosphatase 03/16/2023 58    • Total Protein 03/16/2023 5 9 (L)    • Albumin 03/16/2023 3 1 (L)    • Total Bilirubin 03/16/2023 0 60    • eGFR 03/16/2023 72    • Magnesium 03/16/2023 2 0    • Phosphorus 03/16/2023 2 6    • hs TnI 0hr 03/16/2023 36    • hs TnI 2hr 03/16/2023 31    • Delta 2hr hsTnI 03/16/2023 -5    • hs TnI 4hr 03/16/2023 30    • Delta 4hr hsTnI 03/16/2023 -6    • WBC 03/17/2023 9 73    • RBC 03/17/2023 3 89    • Hemoglobin 03/17/2023 12 3    • Hematocrit 03/17/2023 39 3    • MCV 03/17/2023 101 (H)    • MCH 03/17/2023 31 6    • MCHC 03/17/2023 31 3 (L)    • RDW 03/17/2023 13 0    • MPV 03/17/2023 9 7    • Platelets 68/84/0661 217    • Sodium 03/17/2023 138    • Potassium 03/17/2023 4 3    • Chloride 03/17/2023 104    • CO2 03/17/2023 26    • ANION GAP 03/17/2023 8    • BUN 03/17/2023 13    • Creatinine 03/17/2023 0 82    • Glucose 03/17/2023 153 (H)    • Calcium 03/17/2023 8 3 (L)    • eGFR 03/17/2023 84    • Magnesium 03/17/2023 2 0    • Procalcitonin 03/17/2023 0 37 (H)    • Ventricular Rate 03/16/2023 112    • Atrial Rate 03/16/2023 112    • MA Interval 03/16/2023 158    • QRSD Interval 03/16/2023 92    • QT Interval 03/16/2023 344    • QTC Interval 03/16/2023 469    • P Axis 03/16/2023 66    • QRS Axis 03/16/2023 64    • T Wave Axis 03/16/2023 59    • Segmented % 03/17/2023 86 (H)    • Bands % 03/17/2023 4    • Lymphocytes % 03/17/2023 4 (L)    • Monocytes % 03/17/2023 6    • Eosinophils, % 03/17/2023 0    • Basophils % 03/17/2023 0    • Absolute Neutrophils 03/17/2023 8 76 (H)    • Lymphocytes Absolute 03/17/2023 0 39 (L)    • Monocytes Absolute 03/17/2023 0 58    • Eosinophils Absolute 03/17/2023 0 00    • Basophils Absolute 03/17/2023 0 00    • RBC Morphology 03/17/2023 Normal    • Platelet Estimate 76/38/2424 Adequate         Imaging: X-ray chest 1 view portable    Result Date: 4/3/2023  Narrative: CHEST INDICATION:   chest pain  COMPARISON:  CXR 3/28/2023 and chest CT 04/03/2023  EXAM PERFORMED/VIEWS:  XR CHEST PORTABLE  FINDINGS: Cardiomediastinal silhouette normal  Clips in the right neck  Severe emphysema  Right greater than left bibasilar pneumonia  No pneumothorax  Upper abdomen normal  Bones normal for age  Impression: Severe emphysema with right greater than left bibasilar pneumonia  Workstation performed: [id number]     CT orbits/temporal bones/skull base wo contrast    Result Date: 4/5/2023  Narrative: CT TEMPORAL BONES WITHOUT CONTRAST INDICATION:   Hearing loss, mixed acute hearing loss  COMPARISON:  None  TECHNIQUE: Using a multi-detector scanner, 0 625 mm axial scans of the temporal bone were acquired using a high-resolution bone technique  Targeted reconstructions were obtained of each side, including axial, coronal, poschl and stenvers views  All reconstructed images were reviewed  Soft tissue reconstructions were performed as well  Radiation dose length product (DLP) for this visit:  755 77 mGy-cm     This examination, like all CT scans performed in the [facility], was performed utilizing techniques to minimize radiation dose exposure, including the use of iterative  reconstruction and automated exposure control  IV Contrast: None IMAGE QUALITY:  Diagnostic  FINDINGS: RIGHT TEMPORAL BONE: PERIAURICULAR SOFT TISSUES:  Normal  EXTERNAL AUDITORY CANAL: Normal  MIDDLE EAR: Normal  OSSICLES:Normal  MASTOID AIR CELLS: Normal  COCHLEA: Normal  OTIC CAPSULE: Normal mineralization  VESTIBULE: Normal  VESTIBULAR AND COCHLEAR AQUEDUCT: Normal SEMICIRCULAR CANALS: Normal  INTERNAL AUDITORY CANAL: Normal  FACIAL NERVE CANAL: Normal  CAROTID CANAL: Normal  JUGULAR FORAMEN: Normal  LEFT TEMPORAL BONE: PERIAURICULAR SOFT TISSUES:  Normal  EXTERNAL AUDITORY CANAL: Normal  MIDDLE EAR: Normal  OSSICLES:Normal  MASTOID AIR CELLS: Normal  COCHLEA: Normal  OTIC CAPSULE: Normal mineralization  VESTIBULE: Normal  VESTIBULAR AND COCHLEAR AQUEDUCT: Normal SEMICIRCULAR CANALS: Normal  INTERNAL AUDITORY CANAL: Normal  FACIAL NERVE CANAL: Normal  CAROTID CANAL: Normal  JUGULAR FORAMEN: Normal  OTHER FINDINGS:  Subtle thickening of the maxillary sinuses  No air-fluid levels  Vascular calcification of the distal vertebral arteries and cavernous internal carotid arteries  Impression: Unremarkable CT of the temporal bones  Workstation performed: [id number]     XR chest portable ICU    Result Date: 4/4/2023  Narrative: CHEST INDICATION:   hypoxia  COMPARISON:  4/3/2023 EXAM PERFORMED/VIEWS:  XR CHEST PORTABLE ICU FINDINGS: Cardiomediastinal silhouette appears unremarkable  There is stable right greater than left patchy bibasilar opacity  There are emphysematous changes most severe in the right upper lobe  Osseous structures appear within normal limits for patient age  Impression: Stable bibasilar patchy opacity, right greater than left, likely pneumonia  Workstation performed: [id number]     CTA ED chest PE Study    Result Date: 4/3/2023  Narrative: CTA - CHEST WITH IV CONTRAST - PULMONARY ANGIOGRAM INDICATION:   tachycardia hypoxia    Per my review of the medical record, the patient is a former smoker  History of prostate cancer  COMPARISON: CXR from today and chest CT from 2/22/2023, 2/28/2022, and 2/5/2021  TECHNIQUE: CT angiogram timed for optimal opacification of the pulmonary arteries  Axial, sagittal, and coronal 2D reformats created from source data  Coronal 3D MIP postprocessing on the acquisition scanner  Radiation dose length product (DLP):  500 12 mGy-cm   Radiation dose exposure minimized using iterative reconstruction and automated exposure control  IV Contrast:  100 mL of iohexol (OMNIPAQUE)  FINDINGS: PULMONARY ARTERIES:  No pulmonary embolus  LUNGS:  Worsening consolidation in the dependent right lower lobe  Multiple new irregular nodules and foci of tree-in-bud nodularity with a mid and lower lung predominance  Severe upper lobe predominant panlobular emphysema  Benign calcified granulomas  AIRWAYS: No significant filling defects  Mild diffuse bronchial wall thickening  PLEURA:  Unremarkable  HEART/GREAT VESSELS:  Normal for age  MEDIASTINUM AND ZION:  Unremarkable  CHEST WALL AND LOWER NECK: Gynecomastia  Subcentimeter left thyroid nodule  No follow-up needed  UPPER ABDOMEN:  Tiny calcified gallstone  Subcentimeter hepatic cyst   Left renal cysts  Colonic diverticuli  OSSEOUS STRUCTURES:  Moderate degenerative disease in the spine with mild dextrocurvature  Old right rib fractures  Impression: No pulmonary embolus  Worsening right lower lobe consolidation with multiple new irregular nodules and tree-in-bud nodularity with a mid and lower lung predominance due to pneumonia  The distribution is suggestive of aspiration  Workstation performed: [id number]       Review of Systems:  Review of Systems   Constitutional: Negative for chills, diaphoresis, fatigue and fever  HENT: Positive for hearing loss  Negative for trouble swallowing and voice change  Eyes: Negative for pain and redness  Respiratory: Positive for shortness of breath (chronic)   Negative for "wheezing  Cardiovascular: Positive for leg swelling  Negative for chest pain and palpitations  Gastrointestinal: Negative for abdominal pain, constipation, diarrhea, nausea and vomiting  Genitourinary: Positive for dysuria (intermittent)  Musculoskeletal: Positive for arthralgias  Negative for neck pain and neck stiffness  Skin: Negative for rash  Neurological: Negative for dizziness, syncope, light-headedness and headaches  Psychiatric/Behavioral: Negative for agitation and confusion  All other systems reviewed and are negative  Vitals:    04/27/23 1343   BP: 100/52   Pulse: 80   SpO2: (!) 87%   Weight: 88 kg (194 lb)   Height: 5' 8\" (1 727 m)     Vitals:    04/27/23 1343   Weight: 88 kg (194 lb)     Height: 5' 8\" (172 7 cm)     Physical Exam:  Physical Exam  Vitals reviewed  Constitutional:       General: He is not in acute distress  Appearance: He is not diaphoretic  HENT:      Head: Normocephalic and atraumatic  Comments: Nasal cannula oxygen in place  Eyes:      General:         Right eye: No discharge  Left eye: No discharge  Neck:      Comments: Trachea midline, no JVD present  Cardiovascular:      Rate and Rhythm: Normal rate and regular rhythm  Heart sounds: Murmur (BRENDEN) heard  Pulmonary:      Effort: No respiratory distress  Breath sounds: Wheezing present  Chest:      Chest wall: No tenderness  Abdominal:      General: Bowel sounds are normal       Palpations: Abdomen is soft  Tenderness: There is no abdominal tenderness  There is no rebound  Musculoskeletal:      Right lower leg: Edema (trace) present  Left lower leg: Edema (trace) present  Skin:     General: Skin is warm and dry  Neurological:      Mental Status: He is alert        Comments: Awake, alert, able to answer questions appropriately, hard of hearing   Psychiatric:         Mood and Affect: Mood normal          Behavior: Behavior normal        "

## 2023-04-27 NOTE — PROGRESS NOTES
Chart Reviewed  Patient saw Pulmonary on 3/22/23  Patient is to get repeat CT chest on 5/1/23  Message sent to Dr Felicity Crigler and PA to see if patient needs to wait for SpaceOAR/Marker placement until after CT or if this can get scheduled now  I will wait for response

## 2023-04-28 ENCOUNTER — APPOINTMENT (EMERGENCY)
Dept: RADIOLOGY | Facility: HOSPITAL | Age: 78
End: 2023-04-28

## 2023-04-28 ENCOUNTER — HOSPITAL ENCOUNTER (EMERGENCY)
Facility: HOSPITAL | Age: 78
Discharge: HOME | End: 2023-04-28
Attending: EMERGENCY MEDICINE

## 2023-04-28 ENCOUNTER — TELEPHONE (OUTPATIENT)
Dept: FAMILY MEDICINE CLINIC | Facility: CLINIC | Age: 78
End: 2023-04-28

## 2023-04-28 ENCOUNTER — CLINICAL SUPPORT (OUTPATIENT)
Dept: FAMILY MEDICINE CLINIC | Facility: CLINIC | Age: 78
End: 2023-04-28

## 2023-04-28 VITALS
RESPIRATION RATE: 18 BRPM | SYSTOLIC BLOOD PRESSURE: 152 MMHG | TEMPERATURE: 98 F | OXYGEN SATURATION: 96 % | HEART RATE: 90 BPM | DIASTOLIC BLOOD PRESSURE: 65 MMHG

## 2023-04-28 DIAGNOSIS — R30.0 BURNING WITH URINATION: Primary | ICD-10-CM

## 2023-04-28 DIAGNOSIS — R39.15 URINARY URGENCY: ICD-10-CM

## 2023-04-28 DIAGNOSIS — R42 LIGHTHEADEDNESS: Primary | ICD-10-CM

## 2023-04-28 PROBLEM — R09.02 HYPOXEMIA: Status: ACTIVE | Noted: 2023-04-28

## 2023-04-28 PROBLEM — F52.8 PSYCHOSEXUAL DYSFUNCTION WITH INHIBITED SEXUAL EXCITEMENT: Status: RESOLVED | Noted: 2021-01-11 | Resolved: 2023-04-28

## 2023-04-28 PROBLEM — I50.20: Status: ACTIVE | Noted: 2023-04-28

## 2023-04-28 LAB
ALBUMIN SERPL BCP-MCNC: 3.3 G/DL (ref 3.5–5)
ALP SERPL-CCNC: 69 U/L (ref 34–104)
ALT SERPL W P-5'-P-CCNC: 22 U/L (ref 7–52)
ANION GAP SERPL CALCULATED.3IONS-SCNC: 8 MMOL/L (ref 4–13)
APTT PPP: 30 SECONDS (ref 23–37)
AST SERPL W P-5'-P-CCNC: 16 U/L (ref 13–39)
ATRIAL RATE: 92 BPM
BACTERIA UR QL AUTO: NORMAL /HPF
BASOPHILS # BLD AUTO: 0.02 THOUSANDS/ΜL (ref 0–0.1)
BASOPHILS NFR BLD AUTO: 0 % (ref 0–1)
BILIRUB SERPL-MCNC: 0.73 MG/DL (ref 0.2–1)
BILIRUB UR QL STRIP: NEGATIVE
BUN SERPL-MCNC: 13 MG/DL (ref 5–25)
CALCIUM ALBUM COR SERPL-MCNC: 9.5 MG/DL (ref 8.3–10.1)
CALCIUM SERPL-MCNC: 8.9 MG/DL (ref 8.4–10.2)
CHLORIDE SERPL-SCNC: 93 MMOL/L (ref 96–108)
CLARITY UR: CLEAR
CO2 SERPL-SCNC: 40 MMOL/L (ref 21–32)
COLOR UR: YELLOW
CREAT SERPL-MCNC: 0.85 MG/DL (ref 0.6–1.3)
EOSINOPHIL # BLD AUTO: 0 THOUSAND/ΜL (ref 0–0.61)
EOSINOPHIL NFR BLD AUTO: 0 % (ref 0–6)
ERYTHROCYTE [DISTWIDTH] IN BLOOD BY AUTOMATED COUNT: 14.4 % (ref 11.6–15.1)
FLUAV RNA RESP QL NAA+PROBE: NEGATIVE
FLUBV RNA RESP QL NAA+PROBE: NEGATIVE
GFR SERPL CREATININE-BSD FRML MDRD: 83 ML/MIN/1.73SQ M
GLUCOSE SERPL-MCNC: 88 MG/DL (ref 65–140)
GLUCOSE UR STRIP-MCNC: NEGATIVE MG/DL
HCT VFR BLD AUTO: 38.1 % (ref 36.5–49.3)
HGB BLD-MCNC: 12.2 G/DL (ref 12–17)
HGB UR QL STRIP.AUTO: NEGATIVE
IMM GRANULOCYTES # BLD AUTO: 0.05 THOUSAND/UL (ref 0–0.2)
IMM GRANULOCYTES NFR BLD AUTO: 1 % (ref 0–2)
INR PPP: 1.1 (ref 0.84–1.19)
KETONES UR STRIP-MCNC: NEGATIVE MG/DL
LACTATE SERPL-SCNC: 1.4 MMOL/L (ref 0.5–2)
LACTATE SERPL-SCNC: 2.4 MMOL/L (ref 0.5–2)
LEUKOCYTE ESTERASE UR QL STRIP: NEGATIVE
LYMPHOCYTES # BLD AUTO: 0.63 THOUSANDS/ΜL (ref 0.6–4.47)
LYMPHOCYTES NFR BLD AUTO: 6 % (ref 14–44)
MCH RBC QN AUTO: 31.6 PG (ref 26.8–34.3)
MCHC RBC AUTO-ENTMCNC: 32 G/DL (ref 31.4–37.4)
MCV RBC AUTO: 99 FL (ref 82–98)
MONOCYTES # BLD AUTO: 1.06 THOUSAND/ΜL (ref 0.17–1.22)
MONOCYTES NFR BLD AUTO: 11 % (ref 4–12)
NEUTROPHILS # BLD AUTO: 8.34 THOUSANDS/ΜL (ref 1.85–7.62)
NEUTS SEG NFR BLD AUTO: 82 % (ref 43–75)
NITRITE UR QL STRIP: NEGATIVE
NON-SQ EPI CELLS URNS QL MICRO: NORMAL /HPF
NRBC BLD AUTO-RTO: 0 /100 WBCS
P AXIS: 73 DEGREES
PH UR STRIP.AUTO: 7.5 [PH]
PLATELET # BLD AUTO: 217 THOUSANDS/UL (ref 149–390)
PMV BLD AUTO: 9.6 FL (ref 8.9–12.7)
POTASSIUM SERPL-SCNC: 3.1 MMOL/L (ref 3.5–5.3)
PR INTERVAL: 164 MS
PROCALCITONIN SERPL-MCNC: 0.12 NG/ML
PROT SERPL-MCNC: 5.9 G/DL (ref 6.4–8.4)
PROT UR STRIP-MCNC: ABNORMAL MG/DL
PROTHROMBIN TIME: 14.2 SECONDS (ref 11.6–14.5)
QRS AXIS: 58 DEGREES
QRSD INTERVAL: 96 MS
QT INTERVAL: 364 MS
QTC INTERVAL: 450 MS
RBC # BLD AUTO: 3.86 MILLION/UL (ref 3.88–5.62)
RBC #/AREA URNS AUTO: NORMAL /HPF
RSV RNA RESP QL NAA+PROBE: NEGATIVE
SARS-COV-2 RNA RESP QL NAA+PROBE: NEGATIVE
SL AMB  POCT GLUCOSE, UA: NEGATIVE
SL AMB LEUKOCYTE ESTERASE,UA: NEGATIVE
SL AMB POCT BILIRUBIN,UA: NEGATIVE
SL AMB POCT BLOOD,UA: ABNORMAL
SL AMB POCT CLARITY,UA: CLEAR
SL AMB POCT COLOR,UA: YELLOW
SL AMB POCT KETONES,UA: NEGATIVE
SL AMB POCT NITRITE,UA: NEGATIVE
SL AMB POCT PH,UA: 6.5
SL AMB POCT SPECIFIC GRAVITY,UA: 1.02
SL AMB POCT URINE PROTEIN: ABNORMAL
SL AMB POCT UROBILINOGEN: ABNORMAL
SODIUM SERPL-SCNC: 141 MMOL/L (ref 135–147)
SP GR UR STRIP.AUTO: 1.01
T WAVE AXIS: 69 DEGREES
UROBILINOGEN UR QL STRIP.AUTO: >=8 E.U./DL
VENTRICULAR RATE: 92 BPM
WBC # BLD AUTO: 10.1 THOUSAND/UL (ref 4.31–10.16)
WBC #/AREA URNS AUTO: NORMAL /HPF

## 2023-04-28 RX ORDER — POTASSIUM CHLORIDE 20 MEQ/1
40 TABLET, EXTENDED RELEASE ORAL ONCE
Status: DISCONTINUED | OUTPATIENT
Start: 2023-04-28 | End: 2023-04-28 | Stop reason: HOSPADM

## 2023-04-28 RX ADMIN — SODIUM CHLORIDE 1000 ML: 0.9 INJECTION, SOLUTION INTRAVENOUS at 18:16

## 2023-04-28 RX ADMIN — SODIUM CHLORIDE 500 ML: 0.9 INJECTION, SOLUTION INTRAVENOUS at 17:00

## 2023-04-28 NOTE — TELEPHONE ENCOUNTER
Spoke with patients daughter and patient in regards to uti symptoms  Patietn experiencing burning with urination, urgency, and pressure  Since hospital stay patient is overall feeling ok  Reports having more weakness in right leg than prior along with difficulty walking, did have to use wheelchair as well as take breaks upon entering office today  Having blurry/cloudy vision at times  More SOB than usual  Was lightheaded a couple of times today following PT visit  Reports having low BP's but normal HR  Spoke with provider in regards to patients symptoms and she recommended patient go back to the ED due to potential risk of sepsis  Informed patients daughter of this  She verbalized understanding

## 2023-05-01 ENCOUNTER — HOSPITAL ENCOUNTER (OUTPATIENT)
Dept: CT IMAGING | Facility: HOSPITAL | Age: 78
Discharge: HOME | End: 2023-05-01

## 2023-05-01 ENCOUNTER — APPOINTMENT (OUTPATIENT)
Dept: LAB | Facility: HOSPITAL | Age: 78
End: 2023-05-01
Attending: INTERNAL MEDICINE

## 2023-05-01 DIAGNOSIS — J18.9 PNEUMONIA OF BOTH LOWER LOBES DUE TO INFECTIOUS ORGANISM: ICD-10-CM

## 2023-05-03 ENCOUNTER — DOCUMENTATION (OUTPATIENT)
Dept: HEMATOLOGY ONCOLOGY | Facility: CLINIC | Age: 78
End: 2023-05-03

## 2023-05-03 ENCOUNTER — OFFICE VISIT (OUTPATIENT)
Dept: FAMILY MEDICINE CLINIC | Facility: CLINIC | Age: 78
End: 2023-05-03

## 2023-05-03 VITALS
HEIGHT: 68 IN | RESPIRATION RATE: 18 BRPM | WEIGHT: 197 LBS | OXYGEN SATURATION: 84 % | HEART RATE: 95 BPM | SYSTOLIC BLOOD PRESSURE: 100 MMHG | TEMPERATURE: 97.8 F | DIASTOLIC BLOOD PRESSURE: 60 MMHG | BODY MASS INDEX: 29.86 KG/M2

## 2023-05-03 DIAGNOSIS — R13.10 DYSPHAGIA, UNSPECIFIED TYPE: ICD-10-CM

## 2023-05-03 DIAGNOSIS — I49.9 IRREGULAR HEART BEAT: ICD-10-CM

## 2023-05-03 DIAGNOSIS — A41.9 SEPSIS DUE TO PNEUMONIA (HCC): ICD-10-CM

## 2023-05-03 DIAGNOSIS — G89.29 CHRONIC RIGHT-SIDED THORACIC BACK PAIN: ICD-10-CM

## 2023-05-03 DIAGNOSIS — R13.10 DYSPHAGIA, UNSPECIFIED TYPE: Primary | ICD-10-CM

## 2023-05-03 DIAGNOSIS — J18.9 SEPSIS DUE TO PNEUMONIA (HCC): ICD-10-CM

## 2023-05-03 DIAGNOSIS — H91.93 BILATERAL HEARING LOSS, UNSPECIFIED HEARING LOSS TYPE: ICD-10-CM

## 2023-05-03 DIAGNOSIS — J96.21 ACUTE ON CHRONIC RESPIRATORY FAILURE WITH HYPOXIA (HCC): Primary | ICD-10-CM

## 2023-05-03 DIAGNOSIS — G89.4 CHRONIC PAIN SYNDROME: ICD-10-CM

## 2023-05-03 DIAGNOSIS — N18.31 STAGE 3A CHRONIC KIDNEY DISEASE (HCC): Chronic | ICD-10-CM

## 2023-05-03 DIAGNOSIS — I50.32 CHRONIC DIASTOLIC (CONGESTIVE) HEART FAILURE (HCC): Chronic | ICD-10-CM

## 2023-05-03 DIAGNOSIS — J44.9 CHRONIC OBSTRUCTIVE PULMONARY DISEASE, UNSPECIFIED COPD TYPE (HCC): ICD-10-CM

## 2023-05-03 DIAGNOSIS — R71.8 ELEVATED MCV: ICD-10-CM

## 2023-05-03 DIAGNOSIS — R26.81 UNSTEADINESS ON FEET: ICD-10-CM

## 2023-05-03 DIAGNOSIS — G58.8 INTERCOSTAL NEURALGIA: ICD-10-CM

## 2023-05-03 DIAGNOSIS — M54.6 CHRONIC RIGHT-SIDED THORACIC BACK PAIN: ICD-10-CM

## 2023-05-03 DIAGNOSIS — R60.0 BILATERAL LOWER EXTREMITY EDEMA: ICD-10-CM

## 2023-05-03 DIAGNOSIS — E87.6 HYPOKALEMIA: ICD-10-CM

## 2023-05-03 DIAGNOSIS — R42 LIGHTHEADEDNESS: ICD-10-CM

## 2023-05-03 LAB
BACTERIA BLD CULT: NORMAL
BACTERIA BLD CULT: NORMAL

## 2023-05-03 RX ORDER — IPRATROPIUM BROMIDE AND ALBUTEROL SULFATE 2.5; .5 MG/3ML; MG/3ML
3 SOLUTION RESPIRATORY (INHALATION) 4 TIMES DAILY
Start: 2023-05-03

## 2023-05-03 RX ORDER — FLUTICASONE PROPIONATE 50 MCG
1 SPRAY, SUSPENSION (ML) NASAL DAILY
COMMUNITY

## 2023-05-03 RX ORDER — ACETAMINOPHEN 325 MG/1
650 TABLET ORAL EVERY 6 HOURS PRN
COMMUNITY

## 2023-05-03 RX ORDER — GABAPENTIN 300 MG/1
300 CAPSULE ORAL
Start: 2023-05-03

## 2023-05-03 NOTE — PROGRESS NOTES
Per Dr Joao Wheat, patient is being seen on 5/15/23  At that point patient will be getting reassessed and possibly cleared for SpaceOAR/Markers so he can then get RT for his prostate cancer

## 2023-05-03 NOTE — ED PROVIDER NOTES
History  Chief Complaint   Patient presents with    Painful Urination     Patient comes in after appointment at cardiology yesterday and reported burning with urination  Patient also reported back pain as well  Patient is a 13-year-old male with history of recent admission last month for pneumonia/sepsis who presents for evaluation of dysuria  Apparently PCP advised the patient to come in to get checked for sepsis  He complains of some burning when he urinated just the beginning of his stream   He denies any hematuria  He does endorse some dull/achy lumbar back pain that is somewhat chronic in nature  Denies nausea vomiting fever chills rigors  He has been feeling slightly lightheaded upon standing that fades after a couple seconds  No chest pain or dyspnea associate with his symptoms  He denies bowel bladder incontinence  He denies any saddle anesthesia  No weakness numbness in the legs          Prior to Admission Medications   Prescriptions Last Dose Informant Patient Reported? Taking? ALPRAZolam (XANAX) 0 25 mg tablet   No No   Sig: Take 1 tablet (0 25 mg total) by mouth 2 (two) times a day as needed for anxiety   Blood Pressure Monitor KIT   No No   Sig: Use daily   Patient taking differently: Use 40 mg daily   aspirin 81 mg chewable tablet   Yes No   Si mg daily at bedtime   budesonide (PULMICORT) 0 5 mg/2 mL nebulizer solution   No No   Sig: Take 2 mL (0 5 mg total) by nebulization every 12 (twelve) hours Rinse mouth after use     dextromethorphan-guaiFENesin (ROBITUSSIN DM)  mg/5 mL syrup   No No   Sig: Take 5 mL by mouth 3 (three) times a day as needed for cough   famotidine (PEPCID) 20 mg tablet   No No   Sig: Take 1 tablet (20 mg total) by mouth daily at bedtime   formoterol (PERFOROMIST) 20 MCG/2ML nebulizer solution   No No   Sig: Take 2 mL (20 mcg total) by nebulization every 12 (twelve) hours   furosemide (LASIX) 40 mg tablet   No No   Sig: Take 1 tablet (40 mg total) by mouth daily   ipratropium-albuterol (DUO-NEB) 0 5-2 5 mg/3 mL nebulizer solution   No No   Sig: Take 3 mL by nebulization 3 (three) times a day   metoprolol succinate (TOPROL-XL) 25 mg 24 hr tablet   No No   Sig: Take 0 5 tablets (12 5 mg total) by mouth 2 (two) times a day   oxygen gas   Yes No   Sig: Inhale 2 L/min continuous   2LPM at rest and 3LPM with activity per D Cedric FANG notes  Indications: SOB, pulmonary edema suspected   potassium chloride (Klor-Con) 10 mEq tablet   No No   Sig: Take 2 tablets (20 mEq total) by mouth daily   predniSONE 20 mg tablet   No No   Sig: 3 tabs by mouth days 1-5, 2 tabs by mouth days 6-10, 1 tab by mouth days 11-15   rosuvastatin (CRESTOR) 40 MG tablet   No No   Sig: TAKE 1 TABLET DAILY   Patient taking differently: Take 40 mg by mouth daily at bedtime      Facility-Administered Medications: None       Past Medical History:   Diagnosis Date    Bilateral carotid artery stenosis     Cancer (HCC)     skin    Chronic diastolic heart failure (HCC)     Chronic ischemic heart disease     Chronic kidney disease     stage 3    Colon polyp     [facility]ch[facility] obstructive pulmonary disease) ([facility] )     Coronary artery disease     hx stents, MI, PCI    COVID 11/2021    CPAP (continuous positive airway pressure) dependence     [email or web address] 1995    started    Hearing loss     History of transfusion 1995    Hyperlipidemia     Hypertension     Intracranial aneurysm 4/25/2023    Lung nodule 2023    x rays    MI [facility]myocardial infarction) ([email or web address] 1995    Myocardial infarction ([facility] ) 1995    Pneumonia     Pneumothorax feb 20,2023    collapsed lung    Prostate cancer ([facility] )     RSV (respiratory syncytial virus infection) 10/2022    S/P carotid endarterectomy     Shortness of breath     O2 2 l/nc PRN    Sleep apnea     Sleep apnea, obstructive     Stented coronary artery        Past Surgical History:   Procedure Laterality Date    APPENDECTOMY      CARDIAC CATHETERIZATION  2021    left main with no significant disease, proximal LAD with 10% stenosis at the site of prior stent, left circumflex artery with minimal luminal irregularities mid RCA with 50% stenosis at site of prior stent and PL segment with distal disease supplied by collaterals from the distal circumflex with no significant CAD requiring revascularization at that time   CATARACT EXTRACTION, BILATERAL Bilateral     COLONOSCOPY      CORONARY ANGIOPLASTY WITH STENT PLACEMENT      RCA    CORONARY ANGIOPLASTY WITH STENT PLACEMENT      RCA    CORONARY ANGIOPLASTY WITH STENT PLACEMENT      LAD    EYE SURGERY      PA BX PROSTATE STRTCTC SATURATION SAMPLING IMG GID N/A 2022    Procedure: TRANSPERINEAL MRI FUSION  BIOPSY PROSTATE;  Surgeon: Sumit Patrick MD;  [facility];  Service: Urology    PA NEUROPLASTY &/TRANSPOS MEDIAN NRV CARPAL Amy Gins Left 2022    Procedure: RELEASE CARPAL TUNNEL;  Surgeon: Summer Perales MD;  [facility];  Service: Orthopedics    PA NEUROPLASTY &/TRANSPOSITION ULNAR NERVE ELBOW Left 2022    Procedure: RELEASE CUBITAL TUNNEL;  Surgeon: Summer Perales MD;  [facility];  Service: Orthopedics    PA NEUROPLASTY &/TRANSPOSITION ULNAR NERVE WRIST Left 2022    Procedure: Burnadette Loges;  Surgeon: Summer Perales MD;  [facility];  Service: Orthopedics    SKIN CANCER EXCISION      chin-per pt, basal cell  also right ankle    TONSILLECTOMY  no       Family History   Problem Relation Age of Onset    Heart attack Mother     Dementia Mother     Heart failure Mother              No Known Problems Father      I have reviewed and agree with the history as documented      E-Cigarette/Vaping    E-Cigarette Use Never User      E-Cigarette/Vaping Substances    Nicotine No     THC No     CBD No     Flavoring No     Other No     Unknown No      Social History     Tobacco Use    Smoking status: Former     Packs/day: 2 00     Years: 35 00     Pack years: 70 00     Types: Cigarettes     Start date: 1960     Quit date: 1995     Years since quittin 8    Smokeless tobacco: Never    Tobacco comments:     stopped    Vaping Use    Vaping Use: Never used   Substance Use Topics    Alcohol use: Never    Drug use: Never       Review of Systems   Constitutional: Negative for fever  HENT: Negative for sore throat  Eyes: Negative for photophobia  Respiratory: Negative for shortness of breath  Cardiovascular: Negative for chest pain  Gastrointestinal: Negative for abdominal pain  Genitourinary: Negative for dysuria  Musculoskeletal: Negative for back pain  Skin: Negative for rash  Neurological: Negative for light-headedness  Hematological: Negative for adenopathy  Psychiatric/Behavioral: Negative for agitation  All other systems reviewed and are negative  Physical Exam  Physical Exam  Vitals reviewed  Constitutional:       General: He is not in acute distress  Appearance: He is well-developed  HENT:      Head: Normocephalic  Eyes:      Pupils: Pupils are equal, round, and reactive to light  Cardiovascular:      Rate and Rhythm: Normal rate and regular rhythm  Heart sounds: Normal heart sounds  No murmur heard  No friction rub  No gallop  Pulmonary:      Effort: Pulmonary effort is normal       Breath sounds: Normal breath sounds  Abdominal:      General: Bowel sounds are normal  There is no distension  Palpations: Abdomen is soft  Tenderness: There is no abdominal tenderness  There is no guarding  Musculoskeletal:         General: Normal range of motion  Cervical back: Normal range of motion and neck supple  Comments: No reproducible back pain   Skin:     Capillary Refill: Capillary refill takes less than 2 seconds  Neurological:      Mental Status: He is alert and oriented to person, place, and time  Cranial Nerves: No cranial nerve deficit  Sensory: No sensory deficit  Motor: No abnormal muscle tone  Psychiatric:         Behavior: Behavior normal          Thought Content: Thought content normal          Judgment: Judgment normal          Vital Signs  ED Triage Vitals [04/28/23 1511]   Temperature Pulse Respirations Blood Pressure SpO2   98 °F (36 7 °C) 92 18 (!) 106/49 90 %      Temp Source Heart Rate Source Patient Position - Orthostatic VS BP Location FiO2 (%)   Oral -- Sitting Left arm --      Pain Score       4           Vitals:    04/28/23 1552 04/28/23 1736 04/28/23 1745 04/28/23 1930   BP: 114/58 140/63 140/63 152/65   Pulse:   90    Patient Position - Orthostatic VS:    Lying         Visual Acuity      ED Medications  Medications   sodium chloride 0 9 % bolus 1,000 mL (0 mL Intravenous Stopped 4/28/23 1938)       Diagnostic Studies  Results Reviewed     Procedure Component Value Units Date/Time    Blood culture #1 [[id number]] Collected: 04/28/23 1604    Lab Status: Preliminary result Specimen: Blood from Arm, Left Updated: 05/02/23 2302     Blood Culture No Growth After 4 Days  Blood culture #2 [[id number]] Collected: 04/28/23 1657    Lab Status: Preliminary result Specimen: Blood from Arm, Right Updated: 05/02/23 2302     Blood Culture No Growth After 4 Days  Lactic acid 2 Hours [[id number]]  (Normal) Collected: 04/28/23 1852    Lab Status: Final result Specimen: Blood from Arm, Left Updated: 04/28/23 1918     LACTIC ACID 1 4 mmol/L     Narrative:      Result may be elevated if tourniquet was used during collection      Urine Microscopic [[id number]]  (Normal) Collected: 04/28/23 1815    Lab Status: Final result Specimen: Urine, Clean Catch Updated: 04/28/23 1832     RBC, UA 0-1 /hpf      WBC, UA 0-1 /hpf      Epithelial Cells None Seen /hpf      Bacteria, UA Occasional /hpf     UA w Reflex to Microscopic w Reflex to Culture [[id number]]  (Abnormal) Collected: 04/28/23 1815    Lab Status: Final result Specimen: Urine, Clean Catch Updated: 04/28/23 1824     Color, UA Yellow     Clarity, UA Clear     Specific Gravity, UA 1 015     pH, UA 7 5     Leukocytes, UA Negative     Nitrite, UA Negative     Protein, UA 2+ mg/dl      Glucose, UA Negative mg/dl      Ketones, UA Negative mg/dl      Urobilinogen, UA >=8 0 E U /dl      Bilirubin, UA Negative     Occult Blood, UA Negative    Procalcitonin [[id number]]  (Normal) Collected: 04/28/23 1604    Lab Status: Final result Specimen: Blood from Arm, Left Updated: 04/28/23 1729     Procalcitonin 0 12 ng/ml     FLU/RSV/COVID - if FLU/RSV clinically relevant [[id number]]  (Normal) Collected: 04/28/23 1604    Lab Status: Final result Specimen: Nares from Nose Updated: 04/28/23 1650     SARS-CoV-2 Negative     INFLUENZA A PCR Negative     INFLUENZA B PCR Negative     RSV PCR Negative    Narrative:      FOR PEDIATRIC PATIENTS - copy/paste COVID Guidelines URL to browser: [email or web address]  [employer]    SARS-CoV-2 assay is a Nucleic Acid Amplification assay intended for the  qualitative detection of nucleic acid from SARS-CoV-2 in nasopharyngeal  swabs  Results are for the presumptive identification of SARS-CoV-2 RNA  Positive results are indicative of infection with SARS-CoV-2, the virus  causing COVID-19, but do not rule out bacterial infection or co-infection  with other viruses  Laboratories within the [place] and its  territories are required to report all positive results to the appropriate  public health authorities  Negative results do not preclude SARS-CoV-2  infection and should not be used as the sole basis for treatment or other  patient management decisions  Negative results must be combined with  clinical observations, patient history, and epidemiological information  This test has not been FDA cleared or approved  This test has been authorized by FDA under an Emergency Use Authorization  (EUA)  This test is only authorized for the duration of time the  declaration that circumstances exist justifying the authorization of the  emergency use of an in vitro diagnostic tests for detection of SARS-CoV-2  virus and/or diagnosis of COVID-19 infection under section 564(b)(1) of  the Act, 21 U  S C  331ZAQ-8(D)(9), unless the authorization is terminated  or revoked sooner  The test has been validated but independent review by FDA  and CLIA is pending  Test performed using [employer] GeneXpert: This RT-PCR assay targets N2,  a region unique to SARS-CoV-2  A conserved region in the E-gene was chosen  for pan-Sarbecovirus detection which includes SARS-CoV-2  According to CMS-2020-01-R, this platform meets the definition of high-throughput technology  Lactic acid [[id number]]  (Abnormal) Collected: 04/28/23 1604    Lab Status: Final result Specimen: Blood from Arm, Left Updated: 04/28/23 1647     LACTIC ACID 2 4 mmol/L     Narrative:      Result may be elevated if tourniquet was used during collection      Comprehensive metabolic panel [[id number]]  (Abnormal) Collected: 04/28/23 1604    Lab Status: Final result Specimen: Blood from Arm, Left Updated: 04/28/23 1631     Sodium 141 mmol/L      Potassium 3 1 mmol/L      Chloride 93 mmol/L      CO2 40 mmol/L      ANION GAP 8 mmol/L      BUN 13 mg/dL      Creatinine 0 85 mg/dL      Glucose 88 mg/dL      Calcium 8 9 mg/dL      Corrected Calcium 9 5 mg/dL      AST 16 U/L      ALT 22 U/L      Alkaline Phosphatase 69 U/L      Total Protein 5 9 g/dL      Albumin 3 3 g/dL      Total Bilirubin 0 73 mg/dL      eGFR 83 ml/min/1 73sq m     Narrative:      [place] guidelines for Chronic Kidney Disease (CKD):     Stage 1 with normal or high GFR (GFR > 90 mL/min/1 73 square meters)    Stage 2 Mild CKD (GFR = 60-89 mL/min/1 73 square meters)    Stage 3A Moderate CKD (GFR = 45-59 mL/min/1 73 square meters)    Stage 3B Moderate CKD (GFR = 30-44 mL/min/1 73 square meters)    Stage 4 Severe CKD (GFR = 15-29 mL/min/1 73 square meters)    Stage 5 End Stage CKD (GFR <15 mL/min/1 73 square meters)  Note: GFR calculation is accurate only with a steady state creatinine    Protime-INR [[id number]]  (Normal) Collected: 04/28/23 1604    Lab Status: Final result Specimen: Blood from Arm, Left Updated: 04/28/23 1628     Protime 14 2 seconds      INR 1 10    APTT [[id number]]  (Normal) Collected: 04/28/23 1604    Lab Status: Final result Specimen: Blood from Arm, Left Updated: 04/28/23 1628     PTT 30 seconds     CBC and differential [[id number]]  (Abnormal) Collected: 04/28/23 1604    Lab Status: Final result Specimen: Blood from Arm, Left Updated: 04/28/23 1614     WBC 10 10 Thousand/uL      RBC 3 86 Million/uL      Hemoglobin 12 2 g/dL      Hematocrit 38 1 %      MCV 99 fL      MCH 31 6 pg      MCHC 32 0 g/dL      RDW 14 4 %      MPV 9 6 fL      Platelets 432 Thousands/uL      nRBC 0 /100 WBCs      Neutrophils Relative 82 %      Immat GRANS % 1 %      Lymphocytes Relative 6 %      Monocytes Relative 11 %      Eosinophils Relative 0 %      Basophils Relative 0 %      Neutrophils Absolute 8 34 Thousands/µL      Immature Grans Absolute 0 05 Thousand/uL      Lymphocytes Absolute 0 63 Thousands/µL      Monocytes Absolute 1 06 Thousand/µL      Eosinophils Absolute 0 00 Thousand/µL      Basophils Absolute 0 02 Thousands/µL                  XR chest portable   Final Result by Tien Garcia MD (04/28 1728)      Improved but persistent bibasal infiltrates               Workstation performed: [id number]                    Procedures  Procedures         ED Course                               SBIRT 22yo+    Flowsheet Row Most Recent Value   Initial Alcohol Screen: US AUDIT-C     1  How often do you have a drink containing alcohol? 0 Filed at: 04/28/2023 1513   2  How many drinks containing alcohol do you have on a typical day you are drinking? 0 Filed at: 04/28/2023 1513   3a   Male UNDER 65: How often do you have five or more drinks on one occasion? 0 Filed at: 04/28/2023 1513   3b  FEMALE Any Age, or MALE 65+: How often do you have 4 or more drinks on one occassion? 0 Filed at: 04/28/2023 1513   Audit-C Score 0 Filed at: 04/28/2023 1513   CRISTOPHER: How many times in the past year have you    Used an illegal drug or used a prescription medication for non-medical reasons? Never Filed at: 04/28/2023 1513                    Medical Decision Making  Patient is a 71-year-old male who presents for evaluation of some lightheadedness with burning dysuria  Initially noted to have some lactic acidosis and hypokalemia  Initial concern for kidney stones, UTI  However work-up was benign  Felt much better after 1 L of IV fluids and ambulatory with no desaturation or no lightheadedness  Patient slightly dehydrated  Advised strict return precautions and PCP follow-up  Amount and/or Complexity of Data Reviewed  Labs: ordered  Radiology: ordered and independent interpretation performed  Details: Improving bibasilar infiltrates  ECG/medicine tests: ordered and independent interpretation performed  Disposition  Final diagnoses:   Lightheadedness     Time reflects when diagnosis was documented in both MDM as applicable and the Disposition within this note     Time User Action Codes Description Comment    4/28/2023  7:40 PM Shane Crowe Add [[id number]] [facility]       ED Disposition     ED Disposition   Discharge    Condition   Stable    Date/Time   Fri Apr 28, 2023  7:40 PM    Comment   Matthew Gander discharge to home/self care                 Follow-up Information     Follow up With Specialties Details Why Contact Info Additional [address]  Emergency Department Emergency Medicine  If symptoms worsen [address] [phone]  [facility] Emergency Department, [facility] [place] [place] [address], [address] [facility]          Discharge Medication List as of 4/28/2023  7:44 PM      CONTINUE these medications which have NOT CHANGED    Details   ALPRAZolam (XANAX) 0 25 mg tablet Take 1 tablet (0 25 mg total) by mouth 2 (two) times a day as needed for anxiety, Starting Fri 4/21/2023, Normal      aspirin 81 mg chewable tablet 81 mg daily at bedtime, Historical Med      budesonide (PULMICORT) 0 5 mg/2 mL nebulizer solution Take 2 mL (0 5 mg total) by nebulization every 12 (twelve) hours Rinse mouth after use , Starting Fri 3/31/2023, Until Sun 4/30/2023, Normal      dextromethorphan-guaiFENesin (ROBITUSSIN DM)  mg/5 mL syrup Take 5 mL by mouth 3 (three) times a day as needed for cough, Starting Mon 2/13/2023, Normal      famotidine (PEPCID) 20 mg tablet Take 1 tablet (20 mg total) by mouth daily at bedtime, Starting Tue 2/14/2023, Normal      formoterol (PERFOROMIST) 20 MCG/2ML nebulizer solution Take 2 mL (20 mcg total) by nebulization every 12 (twelve) hours, Starting Thu 4/20/2023, Until Sat 5/20/2023, Normal      furosemide (LASIX) 40 mg tablet Take 1 tablet (40 mg total) by mouth daily, Starting Thu 1/20/2022, Normal      ipratropium-albuterol (DUO-NEB) 0 5-2 5 mg/3 mL nebulizer solution Take 3 mL by nebulization 3 (three) times a day, Starting Fri 3/31/2023, Until Sun 4/30/2023, Normal      metoprolol succinate (TOPROL-XL) 25 mg 24 hr tablet Take 0 5 tablets (12 5 mg total) by mouth 2 (two) times a day, Starting Fri 3/31/2023, Normal      oxygen gas Inhale 2 L/min continuous   2LPM at rest and 3LPM with activity per D Cedric FANG notes  Indications: SOB, pulmonary edema suspected, Historical Med      potassium chloride (Klor-Con) 10 mEq tablet Take 2 tablets (20 mEq total) by mouth daily, Starting Thu 1/20/2022, Normal      predniSONE 20 mg tablet 3 tabs by mouth days 1-5, 2 tabs by mouth days 6-10, 1 tab by mouth days 11-15, Normal      rosuvastatin (CRESTOR) 40 MG tablet TAKE 1 TABLET DAILY, Normal Blood Pressure Monitor KIT Use daily, Starting Tue 11/2/2021, Print      gabapentin (NEURONTIN) 300 mg capsule Take 1 capsule (300 mg total) by mouth 2 (two) times a day, Starting Thu 2/23/2023, No Print             No discharge procedures on file      PDMP Review       Value Time User    PDMP Reviewed  Yes 4/21/2023  4:49 PM Emre Rivas DO          ED Provider  Electronically Signed by           Tiara Del Rosario MD  05/03/23 3021

## 2023-05-03 NOTE — PROGRESS NOTES
" Sarita Miranda 1945 male MRN: [id number]    Family Medicine Transition of Care Visit      SUBJECTIVE    CC: [place] Visit     Transitional Care Management Review:  Sarita Miranda is a 65 y o  male here for TCM follow up  Admitted 4/3-4/17 for acute on chronic hypoxic respiratory failure secondary to COPD exacerbation and pneumonia  Hospital course as per discharge summary as below: \"Acute on chronic respiratory failure with hypoxia (HCC)  Assessment & Plan   In the setting of RLL PNA vs pneumonitis and COPD with acute exacerbation    Patient previously requiring BiPAP- now weaned to 4 L O2   Completed course of IV solu medrol on 4/13 per pulmonology recommendations   Continue nebs   Patient medically clear for discharge since 4/13/2023- patient was to be discharged to [facility] [address] on 4/14 however the facility's residents are experiencing GI bug outbreak and would not except the patient until Monday 4/17- CM will need to reach out to [facility] on Monday    Home from [address]  Reports breathing seems to be getting progressively worse, occasional cough but improved with thickening liquids/dysphagia diet     Follow-up with pulmonology 5/15       Sepsis due to pneumonia [facility])  Assessment & Plan   Present on admission with tachypnea and leukocytosis with elevated lactic acid on arrival in the setting of pneumonia   CT chest- RLL inflitrate   BlCx- NGTD   Completed 7 days of Unasyn and 5 days of azithromycin    RP2- positive for metapneumovirus   Sepsis resolved     Stage 3a chronic kidney disease [facility])  Assessment & Plan        Lab Results   Component Value Date     EGFR 86 04/16/2023     EGFR 88 04/12/2023     EGFR 93 04/11/2023     CREATININE 0 77 04/16/2023     CREATININE 0 74 04/12/2023     CREATININE 0 65 04/11/2023       Creatinine currently better than baseline   Continue home lasix, monitor labs     LAMBERTO on CPAP  Assessment & Plan   [facility] [place]     Prediabetes  Assessment & " "Plan   Patient with hyperglycemia in the setting of IV steroids   Blood sugars improved since stopping IV steroids   Discontinued SSI on 4/15     Lumbar radiculopathy  Assessment & Plan   Continue home gabapentin     Bilateral lower extremity swelling noted, recommended trial decreasing gabapentin from BID dosing to daily dosing       Acute hearing loss, right  Assessment & Plan   Likely in the setting of acute OM with tympanic effusion/perforation    Discussed with ID  abx course completed-  steroids completed   Discussed with ENT on 4/10 given no improvement- per ENT no need for additional imaging- still plan for outpatient follow-up in the office with ENT   Patient not to be restarted on azithromycin on discharge per pulmonology    ENT follow-up scheduled for 5/11  Advised discussed with pulmonology risks/benefits of azithromycin  Currently off  Plan for prednisone taper and possible steroid injections       Chronic obstructive pulmonary disease with acute exacerbation [facility])  Assessment & Plan   Likely in the setting of RLL infiltrate/possible PNA and concern for aspiration   Patient completed steroid taper on 4/13   Continue nebs    \"    \"Hospital Course:   Cheng Ingram is a 65 y o  male patient who originally presented to the hospital on 4/3/2023 due to acute hypoxic respiratory failure  Patient presented for evaluation of shortness of breath  Was to be in acute hypoxic respiratory failure secondary to pneumonia and COPD exacerbation  Patient was treated with IV antibiotics and IV steroids  Patient's oxygen requirements returned to baseline 4 L nasal cannula  PT/OT recommending acute care rehab  Patient will be discharged to [facility] with rehab  We will follow-up with ENT on outpatient basis due to hearing loss  Patient's home regimen of Zithromax was discontinued during hospitalization and recommended this continue to be held on discharge    Please review hospital notes " "for complete hospital course  \"    During the TCM phone call patient stated:    TCM Call     Date and time call was made  4/24/2023 10:32 AM    Patient was hospitialized at  [address]    Date of Admission  04/03/23    Date of discharge  04/17/23    Diagnosis  Acute on chronic respiratory failure with hypoxia    Disposition  Home    Current Symptoms  --  stated he is deaf    Cough Severity  Moderate    Shortness of breath severity  Moderate      TCM Call     Post hospital issues  None    Scheduled for follow up? Yes    Did you obtain your prescribed medications  Yes    Do you need help managing your prescriptions or medications  No    Is transportation to your appointment needed  No    I have advised the patient to call PCP with any new or worsening symptoms  Colette TREVINO    Living Arrangements  Children; Spouse or Significiant other    Support System  Children; Spouse    The type of support provided  Emotional; Physical; Other (comment)    Do you have social support  Yes, as much as I need          During today's visit:    2 days ago, pain with repositioning in bed, no pain now but was with movement, depends on twisting  Advised to monitor for recurrence  CHF- Feels lightheadedness/dizziness, feels weak  Occurred the other day during trip to bathroom  Blood pressure has been ranging on lower side  Irregular rhythm noted on exam today, EKG confirmed sinus rhythm with PAC, no significant change when compared to prior  They are complying with their medication changes and discharge instructions  They have the following questions: As above  Review of Systems   All other systems reviewed and are negative        Historical Information     The patient history was reviewed as follows:    Past Medical History:   Diagnosis Date    Bilateral carotid artery stenosis     Cancer ([facility] [address] )     skin    Chronic diastolic heart failure (HCC)     Chronic ischemic heart disease     Chronic kidney " disease     stage 3    Colon polyp     COPD (chronic obstructive pulmonary disease) (HCC)     Coronary artery disease     hx stents, MI, PCI    COVID 11/2021    CPAP (continuous positive airway pressure) dependence     Emphysema of lung ([facility] [address] ) 1995    started    Hearing loss     History of transfusion 1995    Hyperlipidemia     Hypertension     Intracranial aneurysm 4/25/2023    Lung nodule 2023    x rays    [facility]    Myocardial infarction ([place] [address] ) 1995    Pneumonia     Pneumothorax feb 20,2023    collapsed lung    Prostate cancer ([place] [address] )     RSV (respiratory syncytial virus infection) 10/2022    S/P carotid endarterectomy     Shortness of breath     O2 2 l/nc PRN    Sleep apnea     Sleep apnea, obstructive     Stented coronary artery      Past Surgical History:   Procedure Laterality Date    APPENDECTOMY      CARDIAC CATHETERIZATION  03/18/2021    left main with no significant disease, proximal LAD with 10% stenosis at the site of prior stent, left circumflex artery with minimal luminal irregularities mid RCA with 50% stenosis at site of prior stent and PL segment with distal disease supplied by collaterals from the distal circumflex with no significant CAD requiring revascularization at that time      CATARACT EXTRACTION, BILATERAL Bilateral     COLONOSCOPY      CORONARY ANGIOPLASTY WITH STENT PLACEMENT  1999    RCA    CORONARY ANGIOPLASTY WITH STENT PLACEMENT  2001    RCA    CORONARY ANGIOPLASTY WITH STENT PLACEMENT  2003    LAD    EYE SURGERY      OR BX PROSTATE STRTCTC SATURATION SAMPLING IMG GID N/A 09/13/2022    Procedure: TRANSPERINEAL MRI FUSION  BIOPSY PROSTATE;  Surgeon: Maria Victoria Monsalve MD;  Location: [facility];  Service: Urology    OR NEUROPLASTY &/TRANSPOS MEDIAN NRV CARPAL Garrel Kalyn Left 07/22/2022    Procedure: RELEASE CARPAL TUNNEL;  Surgeon: Rossy Gong MD;  Location: [facility];  Service: Orthopedics    OR NEUROPLASTY &/TRANSPOSITION ULNAR NERVE ELBOW Left 2022    Procedure: RELEASE CUBITAL TUNNEL;  Surgeon: Bismark Salomon MD;  Location[facility];  Service: Orthopedics    [place] NEUROPLASTY &/TRANSPOSITION ULNAR NERVE WRIST Left 2022    Procedure: Sandra Martin;  Surgeon: Bismark Salomon MD;  Location: [facility];  Service: Orthopedics    SKIN CANCER EXCISION      chin-per pt, basal cell  also right ankle    TONSILLECTOMY  no     Family History   Problem Relation Age of Onset    Heart attack Mother     Dementia Mother     Heart failure Mother              No Known Problems Father       Social History   Social History     Substance and Sexual Activity   Alcohol Use Never     Social History     Substance and Sexual Activity   Drug Use Never     Social History     Tobacco Use   Smoking Status Former    Packs/day: 2 00    Years: 35 00    Pack years: 70 00    Types: Cigarettes    Start date: 1960   [facility] Quit date: 1995    Years since quittin 8   Smokeless Tobacco Never   Tobacco Comments    stopped        Medications:   Meds/Allergies     Current Outpatient Medications:     acetaminophen (TYLENOL) 325 mg tablet, Take 650 mg by mouth every 6 (six) hours as needed, Disp: , Rfl:     ALPRAZolam (XANAX) 0 25 mg tablet, Take 1 tablet (0 25 mg total) by mouth 2 (two) times a day as needed for anxiety, Disp: 15 tablet, Rfl: 0    aspirin 81 mg chewable tablet, 81 mg daily at bedtime, Disp: , Rfl:     budesonide (PULMICORT) 0 5 mg/2 mL nebulizer solution, Take 2 mL (0 5 mg total) by nebulization every 12 (twelve) hours Rinse mouth after use , Disp: 120 mL, Rfl: 0    dextromethorphan-guaiFENesin (ROBITUSSIN DM)  mg/5 mL syrup, Take 5 mL by mouth 3 (three) times a day as needed for cough, Disp: 354 mL, Rfl: 0    famotidine (PEPCID) 20 mg tablet, Take 1 tablet (20 mg total) by mouth daily at bedtime, Disp: 100 tablet, Rfl: 1    fluticasone (FLONASE) 50 mcg/act nasal spray, 1 spray into each "nostril daily, Disp: , Rfl:     formoterol (PERFOROMIST) 20 MCG/2ML nebulizer solution, Take 2 mL (20 mcg total) by nebulization every 12 (twelve) hours, Disp: 120 mL, Rfl: 0    furosemide (LASIX) 40 mg tablet, Take 1 tablet (40 mg total) by mouth daily, Disp: 100 tablet, Rfl: 3    gabapentin (NEURONTIN) 300 mg capsule, Take 1 capsule (300 mg total) by mouth daily at bedtime, Disp: , Rfl:     ipratropium-albuterol (DUO-NEB) 0 5-2 5 mg/3 mL nebulizer solution, Take 3 mL by nebulization 4 (four) times a day, Disp: , Rfl:     metoprolol succinate (TOPROL-XL) 25 mg 24 hr tablet, Take 0 5 tablets (12 5 mg total) by mouth 2 (two) times a day, Disp: 90 tablet, Rfl: 0    oxygen gas, Inhale 2 L/min continuous  2LPM at rest and 3LPM with activity per D Cedric FANG notes  Indications: SOB, pulmonary edema suspected, Disp: , Rfl:     potassium chloride (Klor-Con) 10 mEq tablet, Take 2 tablets (20 mEq total) by mouth daily, Disp: 200 tablet, Rfl: 3    predniSONE 20 mg tablet, 3 tabs by mouth days 1-5, 2 tabs by mouth days 6-10, 1 tab by mouth days 11-15, Disp: 30 tablet, Rfl: 0    rosuvastatin (CRESTOR) 40 MG tablet, TAKE 1 TABLET DAILY (Patient taking differently: Take 40 mg by mouth daily at bedtime), Disp: 100 tablet, Rfl: 3  Allergies   Allergen Reactions    Lisinopril Swelling and Cough    Tetanus Antitoxin Anaphylaxis    Tetanus Toxoid Anaphylaxis and Swelling       OBJECTIVE    Vitals:   Vitals:    05/03/23 1111   BP: 100/60   Pulse: 95   Resp: 18   Temp: 97 8 °F (36 6 °C)   SpO2: (!) 84%   Weight: 89 4 kg (197 lb)   Height: 5' 8\" (1 727 m)     Body mass index is 29 95 kg/m²  Physical Exam:    Physical Exam  Vitals reviewed  Constitutional:       General: He is not in acute distress  Appearance: Normal appearance  He is not ill-appearing, toxic-appearing or diaphoretic  HENT:      Head: Normocephalic and atraumatic  Eyes:      General:         Right eye: No discharge           Left eye: No " discharge  Extraocular Movements: Extraocular movements intact  Conjunctiva/sclera: Conjunctivae normal    Cardiovascular:      Rate and Rhythm: Normal rate  Rhythm irregular  Heart sounds: Normal heart sounds  No murmur heard  No friction rub  No gallop  Pulmonary:      Effort: Pulmonary effort is normal  No respiratory distress  Comments: Scattered crackles and wheezing trace, diminished breath sounds bibasilar   Musculoskeletal:         General: No swelling, tenderness or signs of injury  Right lower leg: Edema present  Left lower leg: Edema present  Comments: 2+ pitting edema   Skin:     General: Skin is warm  Coloration: Skin is not pale  Findings: No erythema or rash  Neurological:      Mental Status: He is alert and oriented to person, place, and time  Motor: No weakness  Psychiatric:         Mood and Affect: Mood normal          Behavior: Behavior normal           Labs: I have personally reviewed all pertinent results       Admission on 04/28/2023, Discharged on 04/28/2023   Component Date Value Ref Range Status    WBC 04/28/2023 10 10  4 31 - 10 16 Thousand/uL Final    RBC 04/28/2023 3 86 (L)  3 88 - 5 62 Million/uL Final    Hemoglobin 04/28/2023 12 2  12 0 - 17 0 g/dL Final    Hematocrit 04/28/2023 38 1  36 5 - 49 3 % Final    MCV 04/28/2023 99 (H)  82 - 98 fL Final    MCH 04/28/2023 31 6  26 8 - 34 3 pg Final    MCHC 04/28/2023 32 0  31 4 - 37 4 g/dL Final    RDW 04/28/2023 14 4  11 6 - 15 1 % Final    MPV 04/28/2023 9 6  8 9 - 12 7 fL Final    Platelets 94/36/4454 217  149 - 390 Thousands/uL Final    nRBC 04/28/2023 0  /100 WBCs Final    Neutrophils Relative 04/28/2023 82 (H)  43 - 75 % Final    Immat GRANS % 04/28/2023 1  0 - 2 % Final    Lymphocytes Relative 04/28/2023 6 (L)  14 - 44 % Final    Monocytes Relative 04/28/2023 11  4 - 12 % Final    Eosinophils Relative 04/28/2023 0  0 - 6 % Final    Basophils Relative 04/28/2023 0 0 - 1 % Final    Neutrophils Absolute 04/28/2023 8 34 (H)  1 85 - 7 62 Thousands/µL Final    Immature Grans Absolute 04/28/2023 0 05  0 00 - 0 20 Thousand/uL Final    Lymphocytes Absolute 04/28/2023 0 63  0 60 - 4 47 Thousands/µL Final    Monocytes Absolute 04/28/2023 1 06  0 17 - 1 22 Thousand/µL Final    Eosinophils Absolute 04/28/2023 0 00  0 00 - 0 61 Thousand/µL Final    Basophils Absolute 04/28/2023 0 02  0 00 - 0 10 Thousands/µL Final    Sodium 04/28/2023 141  135 - 147 mmol/L Final    Potassium 04/28/2023 3 1 (L)  3 5 - 5 3 mmol/L Final    Chloride 04/28/2023 93 (L)  96 - 108 mmol/L Final    CO2 04/28/2023 40 (H)  21 - 32 mmol/L Final    ANION GAP 04/28/2023 8  4 - 13 mmol/L Final    BUN 04/28/2023 13  5 - 25 mg/dL Final    Creatinine 04/28/2023 0 85  0 60 - 1 30 mg/dL Final    Standardized to IDMS reference method    Glucose 04/28/2023 88  65 - 140 mg/dL Final    If the patient is fasting, the ADA then defines impaired fasting glucose as > 100 mg/dL and diabetes as > or equal to 123 mg/dL   Calcium 04/28/2023 8 9  8 4 - 10 2 mg/dL Final    Corrected Calcium 04/28/2023 9 5  8 3 - 10 1 mg/dL Final    AST 04/28/2023 16  13 - 39 U/L Final    ALT 04/28/2023 22  7 - 52 U/L Final    Specimen collection should occur prior to Sulfasalazine administration due to the potential for falsely depressed results   Alkaline Phosphatase 04/28/2023 69  34 - 104 U/L Final    Total Protein 04/28/2023 5 9 (L)  6 4 - 8 4 g/dL Final    Albumin 04/28/2023 3 3 (L)  3 5 - 5 0 g/dL Final    Total Bilirubin 04/28/2023 0 73  0 20 - 1 00 mg/dL Final    Use of this assay is not recommended for patients undergoing treatment with eltrombopag due to the potential for falsely elevated results  N-acetyl-p-benzoquinone imine (metabolite of Acetaminophen) will generate erroneously low results in samples for patients that have taken an overdose of Acetaminophen      eGFR 04/28/2023 83  ml/min/1 73sq m Final    LACTIC ACID 04/28/2023 2 4 (HH)  0 5 - 2 0 mmol/L Final    Procalcitonin 04/28/2023 0 12  <=0 25 ng/ml Final    Comment: Suspected Lower Respiratory Tract Infection (LRTI):  - LESS than or EQUAL to 0 25 ng/mL:   low likelihood for bacterial LRTI; antibiotics DISCOURAGED   - GREATER than 0 25 ng/mL:   increased likelihood for bacterial LRTI; antibiotics ENCOURAGED  Suspected Sepsis:  - Strongly consider initiating antibiotics in ALL UNSTABLE patients  - LESS than or EQUAL to 0 5 ng/mL:   low likelihood for bacterial sepsis; antibiotics DISCOURAGED   - GREATER than 0 5 ng/mL:   increased likelihood for bacterial sepsis; antibiotics ENCOURAGED   - GREATER than 2 ng/mL:   high risk for severe sepsis / septic shock; antibiotics strongly ENCOURAGED  Decisions on antibiotic use should not be based solely on Procalcitonin (PCT) levels  If PCT is low but uncertainty exists with stopping antibiotics, repeat PCT in 6-24 hours to confirm the low level  If antibiotics are administered (regardless if initial PCT was high or low), repeat PCT every 1-2 days to consider early antibiotic cessation (when GREATER                            than 80% decrease from the peak OR when PCT drops below designated cutoffs, whichever comes first), so long as the infection is NOT one that typically requires prolonged treatment durations (e g , bone/joint infections, endocarditis, Staph  aureus bacteremia)      Situations of FALSE-POSITIVE Procalcitonin values:  1) Newborns < 67 hours old  2) Massive stress from severe trauma / burns, major surgery, acute pancreatitis, cardiogenic / hemorrhagic shock, sickle cell crisis, or other organ perfusion abnormalities  3) Malaria and some Candidal infections  4) Treatment with agents that stimulate cytokines (e g , OKT3, anti-lymphocyte globulins, alemtuzumab, IL-2, granulocyte transfusion [NOT GCSFs])  5) Chronic renal disease causes elevated baseline levels (consider GREATER than 0 75 ng/mL as an abnormal cut-off); initiating HD/CRRT may cause transient decreases  6) Paraneoplastic syndromes from medullary thyroid or SCLC, some forms of vasculitis, and acute [id number]-kcde                            disease    Situations of FALSE-NEGATIVE Procalcitonin values:  1) Too early in clinical course for PCT to have reached its peak (may repeat in 6-24 hours to confirm low level)  2) Localized infection WITHOUT systemic (SIRS / sepsis) response (e g , an abscess, osteomyelitis, cystitis)  3) Mycobacteria (e g , Tuberculosis, MAC)  4) Cystic fibrosis exacerbations     • Protime 04/28/2023 14 2  11 6 - 14 5 seconds Final   • INR 04/28/2023 1 10  0 84 - 1 19 Final   • PTT 04/28/2023 30  23 - 37 seconds Final    Therapeutic Heparin Range =  60-90 seconds   • Blood Culture 04/28/2023 No Growth After 5 Days  Final   • Blood Culture 04/28/2023 No Growth After 5 Days     Final   • Color, UA 04/28/2023 Yellow  Yellow, Straw Final   • Clarity, UA 04/28/2023 Clear  Hazy, Clear Final   • Specific Gravity, UA 04/28/2023 1 015  >1 005 - <1 030 Final   • pH, UA 04/28/2023 7 5  5 0, 5 5, 6 0, 6 5, 7 0, 7 5 Final   • Leukocytes, UA 04/28/2023 Negative  Negative Final   • Nitrite, UA 04/28/2023 Negative  Negative Final   • Protein, UA 04/28/2023 2+ (A)  Negative, Interference- unable to analyze mg/dl Final   • Glucose, UA 04/28/2023 Negative  Negative mg/dl Final   • Ketones, UA 04/28/2023 Negative  Negative mg/dl Final   • Urobilinogen, UA 04/28/2023 >=8 0 (A)  0 2, 1 0 E U /dl E U /dl Final   • Bilirubin, UA 04/28/2023 Negative  Negative Final   • Occult Blood, UA 04/28/2023 Negative  Negative Final   • SARS-CoV-2 04/28/2023 Negative  Negative Final   • INFLUENZA A PCR 04/28/2023 Negative  Negative Final   • INFLUENZA B PCR 04/28/2023 Negative  Negative Final   • RSV PCR 04/28/2023 Negative  Negative Final   • Ventricular Rate 04/28/2023 92  BPM Final   • Atrial Rate 04/28/2023 92  BPM Final   • NM Interval 04/28/2023 164  ms Final  QRSD Interval 04/28/2023 96  ms Final    QT Interval 04/28/2023 364  ms Final    QTC Interval 04/28/2023 450  ms Final    P Axis 04/28/2023 73  degrees Final    QRS Axis 04/28/2023 58  degrees Final    T Wave [place] 04/28/2023 69  degrees Final    LACTIC ACID 04/28/2023 1 4  0 5 - 2 0 mmol/L Final    RBC, UA 04/28/2023 0-1  None Seen, 0-1, 1-2, 2-4, 0-5 /hpf Final    WBC, UA 04/28/2023 0-1  None Seen, 0-1, 1-2, 0-5, 2-4 /hpf Final    Epithelial Cells 04/28/2023 None Seen  None Seen, Occasional /hpf Final    Bacteria, UA 04/28/2023 Occasional  None Seen, Occasional /hpf Final       Imaging:  I have personally reviewed all pertinent results  Assessment/Plan    No problem-specific Assessment & Plan notes found for this encounter  Kandice Sims was seen today for follow-up  Diagnoses and all orders for this visit:    Acute on chronic respiratory failure with hypoxia ([facility] [address] )  -     Vitamin B12; Future  -     POCT ECG    Chronic obstructive pulmonary disease, unspecified COPD type ([facility])  -     Vitamin B12; Future  -     ipratropium-albuterol (DUO-NEB) 0 5-2 5 mg/3 mL nebulizer solution; Take 3 mL by nebulization 4 (four) times a day    Dysphagia, unspecified type    Sepsis due to pneumonia ([facility] [address] )  -     Vitamin B12; Future    Stage 3a chronic kidney disease (HCC)    Hypokalemia  -     Comprehensive metabolic panel; Future  -     Vitamin B12; Future    Elevated MCV  -     Vitamin B12; Future    Unsteadiness on feet  -     Vitamin B12; Future    Bilateral lower extremity edema    Chronic pain syndrome  -     gabapentin (NEURONTIN) 300 mg capsule; Take 1 capsule (300 mg total) by mouth daily at bedtime    Intercostal neuralgia  -     gabapentin (NEURONTIN) 300 mg capsule; Take 1 capsule (300 mg total) by mouth daily at bedtime    Bilateral hearing loss, unspecified hearing loss type    Chronic right-sided thoracic back pain  -     gabapentin (NEURONTIN) 300 mg capsule;  Take 1 capsule (300 mg total) by mouth daily at bedtime    Chronic diastolic (congestive) heart failure ([address] )    Lightheadedness    Irregular heart beat          Future Appointments   Date Time Provider Noel Ary   5/10/2023  2:30 PM NAZ Gruber PAL Practice[facility]   5/11/2023  3:00 PM Ariana Millan PA-C ORL PALM ENT  ORL   5/11/2023  3:15 PM Amada Hernandez ORL PALM AUD  ORL   5/12/2023  3:00 PM Adan Eng MD [facility]   5/15/2023  1:00 PM Liz Lopez DO PULM [facility]   5/26/2023  2:00 PM Sandra Olivares DO PALM PC [facility]   7/27/2023  1:40 PM Victor M Richard DO BM Cardio [facility]   2/7/2024  1:00 PM CA VASCULAR 2 CA Car YEN Salas DO  [facility]

## 2023-05-04 PROBLEM — R13.10 DYSPHAGIA: Status: ACTIVE | Noted: 2023-05-04

## 2023-05-05 ENCOUNTER — TELEPHONE (OUTPATIENT)
Dept: CARDIOLOGY CLINIC | Facility: CLINIC | Age: 78
End: 2023-05-05

## 2023-05-05 DIAGNOSIS — R07.9 CHEST PAIN, UNSPECIFIED TYPE: ICD-10-CM

## 2023-05-05 RX ORDER — METOPROLOL SUCCINATE 25 MG/1
12.5 TABLET, EXTENDED RELEASE ORAL DAILY
Qty: 90 TABLET | Refills: 0 | Status: SHIPPED | OUTPATIENT
Start: 2023-05-05

## 2023-05-05 NOTE — TELEPHONE ENCOUNTER
- Received message from patient's primary care provider about some lightheadedness with softer blood pressures  I called and spoke with patient's daughter Artur Brandt as patient cannot hear at this time and she had even noted that the patient had had a fall yesterday with some abrasions to bilateral arms  She denied that the patient had any loss of consciousness but does note that when he stands up sometimes he gets lightheaded  In that setting we will reduce metoprolol succinate dosing from 12 5 mg twice daily to 12 5 mg daily and have him monitor blood pressures  I also recommended that patient be seen and evaluated in emergency department for his falls for work-up to make sure there was no significant trauma or other issue  Patient's daughter noted understanding and would take him for evaluation  They will continue to monitor patient's blood pressures and let me know if they remain low as if they do we will need to likely discontinue medical therapy at that time

## 2023-05-08 ENCOUNTER — TELEPHONE (OUTPATIENT)
Dept: FAMILY MEDICINE CLINIC | Facility: CLINIC | Age: 78
End: 2023-05-08

## 2023-05-08 NOTE — TELEPHONE ENCOUNTER
Please ask why he fell/what happened when he fell? Would he like to come in for a visit to check his injuries?

## 2023-05-08 NOTE — TELEPHONE ENCOUNTER
[facility] hospital care called to report patient had a fall Thursday evening  Reports he has 3 bruises on back and multiple lacerations on arm as a result of the fall as well as occasional right sided abdominal pain  Reports patient is still moving around/walking and doing ok

## 2023-05-09 NOTE — TELEPHONE ENCOUNTER
I called and spoke with daughter Raquel Og ([phone])  States he got up to go to the restroom and got caught in his O2 cord  He caught himself and fell  He did not hit his head, but has scrapes on his arms  She states that he is doing okay and states he feels pretty decent and would like to follow up with visit on 5/26  They have a call into the [place] about getting a shorter chord for his O2 machine

## 2023-05-12 ENCOUNTER — TELEPHONE (OUTPATIENT)
Dept: FAMILY MEDICINE CLINIC | Facility: CLINIC | Age: 78
End: 2023-05-12

## 2023-05-12 ENCOUNTER — CONSULT (OUTPATIENT)
Dept: PALLIATIVE MEDICINE | Facility: CLINIC | Age: 78
End: 2023-05-12

## 2023-05-12 VITALS
OXYGEN SATURATION: 91 % | TEMPERATURE: 95.9 F | RESPIRATION RATE: 18 BRPM | DIASTOLIC BLOOD PRESSURE: 60 MMHG | HEART RATE: 80 BPM | SYSTOLIC BLOOD PRESSURE: 108 MMHG

## 2023-05-12 DIAGNOSIS — J44.1 CHRONIC OBSTRUCTIVE PULMONARY DISEASE WITH ACUTE EXACERBATION (HCC): ICD-10-CM

## 2023-05-12 NOTE — PROGRESS NOTES
Outpatient Consultation - Palliative and Supportive Care   Jeanette Cortez 77 y o  male [id number]    Assessment & Plan  Problem List Items Addressed This Visit     Chronic obstructive pulmonary disease with acute exacerbation (HCC)       Medications adjusted this encounter:  Requested Prescriptions      No prescriptions requested or ordered in this encounter     No orders of the defined types were placed in this encounter  There are no discontinued medications  - Pt declined all medical therapies today  - Pt wishes to revist our clinic after trial of therapy at home  PPS: [address] and family were seen today for symptoms and planning cares related to above illnesses  Above orders were sent electronically, or provided in hardcopy in clinic  I have reviewed the patient's controlled substance dispensing history in the Prescription Drug Monitoring Program in compliance with the [facility] regulations before prescribing any controlled substances  We appreciate the referral, and wish for them to continue to follow with us  If there are questions or concerns, please contact us through our clinic/answering service 24 hours a day, seven days a week  Oksana Gifford MD  [facility]  [phone]        Visit Information    Accompanied By: Family member    Source of History: Self, family    History Limitations: presbycusis    Contacts: wife Margaret Summers -     Chief Complaint  No chief complaint on file  History of Present Illness      Jeanette Cortez is a 77 y o  male who presents as a referral from Dr Janene Saravia of PCP for primary palliative diagnosis of COPD  Consultation is requested for: symptom management, emotional support in the setting of serious illness  We discussed today that his breathing is quite limiting to him, and he is not entirely happy with his quality of life and his function    He reviewed that he does not have a great deal of pain, but rather dyspnea  For this, opioids are not often used, due to worsening of respiratory drive, and new evidence suggests that even for heart failure, symptoms may not markedly improve compared to side effects of opioids  Pt notes that he does not really become anxious from his symptoms, and will simply sit and rest and his dyspnea will relent with a short frame of time  Of note, he was just in hospital, and has come home from post-hospital rehab just within the last few days  In our brief discussion of options and supports, and long-term anticipatory guidance, the pt appears disgruntled about possibility of returning to hospital or clinic for additional cares and treatments  That noted, he wishes for a trial of indicated therapies, and then to consider our options for comfort  Pt is well supported by family, who help act as interpreters given his marked hearing loss        Historical Data  Past Medical History:   Diagnosis Date   • Abnormal ECG 2021 OR 2022   • Alcoholism ([address] ) 1984    sober 45 years   • Arthritis 2008    beginning   • Bilateral carotid artery stenosis    • Callus    • Cancer (HCC)     skin   • Chronic diastolic heart failure (HCC)    • Chronic ischemic heart disease    • Chronic kidney disease     stage 3   • Colon polyp    • COPD (chronic obstructive pulmonary disease) (HCC)    • Coronary artery disease     hx stents, MI, PCI   • COVID 11/2021   • CPAP (continuous positive airway pressure) dependence    • Disease of thyroid gland 2017    nodules   • Emphysema of lung ([address] ) 1995    started   • Hearing loss    • History of transfusion 1995   • Hyperlipidemia    • Hypertension    • Intracranial aneurysm 04/25/2023   • Lung nodule 2023    x rays   • MI (myocardial infarction) ([address] ) 1995   • Myocardial infarction ([address] ) 1995   • Pneumonia    • Pneumothorax 02/20/2023    collapsed lung   • Prostate cancer ([address] )    • RSV (respiratory syncytial virus infection) 10/2022   • S/P carotid endarterectomy    • Shortness of breath     O2 2 l/nc PRN   • Sleep apnea    • Sleep apnea, obstructive    • Stented coronary artery      Past Surgical History:   Procedure Laterality Date   • APPENDECTOMY     • CARDIAC CATHETERIZATION  03/18/2021    left main with no significant disease, proximal LAD with 10% stenosis at the site of prior stent, left circumflex artery with minimal luminal irregularities mid RCA with 50% stenosis at site of prior stent and PL segment with distal disease supplied by collaterals from the distal circumflex with no significant CAD requiring revascularization at that time     • CATARACT EXTRACTION, BILATERAL Bilateral    • COLONOSCOPY     • CORONARY ANGIOPLASTY WITH STENT PLACEMENT  1999    RCA   • CORONARY ANGIOPLASTY WITH STENT PLACEMENT  2001    RCA   • CORONARY ANGIOPLASTY WITH STENT PLACEMENT  2003    LAD   • EYE SURGERY     • AZ BX PROSTATE STRTCTC SATURATION SAMPLING IMG GID N/A 09/13/2022    Procedure: TRANSPERINEAL MRI FUSION  BIOPSY PROSTATE;  Surgeon: Kory Quick MD;  [facility];  Service: Urology   • AZ NEUROPLASTY &/TRANSPOS MEDIAN NRV CARPAL Lisette Sprawls Left 07/22/2022    Procedure: RELEASE CARPAL TUNNEL;  Surgeon: Adarsh Choe MD;  Location[facility];  Service: Orthopedics   • AZ NEUROPLASTY &/TRANSPOSITION ULNAR NERVE ELBOW Left 07/22/2022    Procedure: RELEASE CUBITAL TUNNEL;  Surgeon: Adarsh Choe MD;  [facility];  Service: Orthopedics   • AZ NEUROPLASTY &/TRANSPOSITION ULNAR NERVE WRIST Left 07/22/2022    Procedure: Dor[place] RELEASE;  Surgeon: Adarsh Choe MD;  Location[facility];  Service: Orthopedics   • SKIN CANCER EXCISION  2012    chin-per pt, basal cell  also right ankle   • TONSILLECTOMY  no     Social History     Socioeconomic History   • Marital status: /Civil Union     Spouse name: Not on file   • Number of children: Not on file   • Years of education: Not on file   • Highest education level: Not on file   Occupational History   • Not on file   Tobacco Use   • Smoking status: Former     Packs/day: 2 00     Years: 35 00     Total pack years: 70 00     Types: Cigarettes     Start date: 1960     Quit date: 1995     Years since quittin 9   • Smokeless tobacco: Never   • Tobacco comments:     stopped smoking the day i had a heart attack   Vaping Use   • Vaping Use: Never used   Substance and Sexual Activity   • Alcohol use: Not Currently   • Drug use: Never   • Sexual activity: Not Currently     Partners: Female     Birth control/protection: None   Other Topics Concern   • Not on file   Social History Narrative   • Not on file     Social Determinants of Health     Financial Resource Strain: Not on file   Food Insecurity: No Food Insecurity (2023)    Hunger Vital Sign    • Worried About Running Out of Food in the Last Year: Never true    • Ran Out of Food in the Last Year: Never true   Transportation Needs: No Transportation Needs (2023)    PRAPARE - Transportation    • Lack of Transportation (Medical): No    • Lack of Transportation (Non-Medical):  No   Physical Activity: Not on file   Stress: Not on file   Social Connections: Not on file   Intimate Partner Violence: Not on file   Housing Stability: Low Risk  (2023)    Housing Stability Vital Sign    • Unable to Pay for Housing in the Last Year: No    • Number of Places Lived in the Last Year: 1    • Unstable Housing in the Last Year: No     Family History   Problem Relation Age of Onset   • Heart attack Mother    • Dementia Mother    • Heart failure Mother             • Heart disease Mother         heart attacks (2)   • No Known Problems Father      Allergies   Allergen Reactions   • Lisinopril Swelling and Cough   • Tetanus Antitoxin Anaphylaxis   • Tetanus Toxoid Anaphylaxis and Swelling     Current Outpatient Medications   Medication Sig Dispense Refill   • aspirin 81 mg chewable tablet 81 mg daily at bedtime     • dextromethorphan-guaiFENesin (ROBITUSSIN DM)  mg/5 mL syrup Take 5 mL by mouth 3 (three) times a day as needed for cough 354 mL 0   • famotidine (PEPCID) 20 mg tablet Take 1 tablet (20 mg total) by mouth daily at bedtime 100 tablet 1   • gabapentin (NEURONTIN) 300 mg capsule Take 1 capsule (300 mg total) by mouth daily at bedtime     • ipratropium-albuterol (DUO-NEB) 0 5-2 5 mg/3 mL nebulizer solution Take 3 mL by nebulization 4 (four) times a day     • rosuvastatin (CRESTOR) 40 MG tablet TAKE 1 TABLET DAILY (Patient taking differently: Take 40 mg by mouth daily at bedtime) 100 tablet 3   • ALPRAZolam (XANAX) 0 25 mg tablet Take 1 tablet (0 25 mg total) by mouth 2 (two) times a day as needed for anxiety 15 tablet 0   • azithromycin (ZITHROMAX) 250 mg tablet Take 500 mg by mouth 3 (three) times a week Monday, Wednesday, Friday     • formoterol (PERFOROMIST) 20 MCG/2ML nebulizer solution Take 2 mL (20 mcg total) by nebulization every 12 (twelve) hours 180 mL 5   • furosemide (LASIX) 40 mg tablet Take 1 tablet (40 mg total) by mouth daily as needed (weight gain more than 3 lbs in 24 hours or worsening lower extremities edema) 100 tablet 0   • metoprolol succinate (TOPROL-XL) 25 mg 24 hr tablet Take 0 5 tablets (12 5 mg total) by mouth daily 45 tablet 3   • oxygen gas Inhale 2 L/min continuous 2LPM at rest and 3-4 LPM with activity per ROXANNE FANG notes     • potassium chloride (K-DUR,KLOR-CON) 20 mEq tablet Take 2 tablets (40 mEq total) by mouth daily as needed (To take only with Lasix) 60 tablet 0   • predniSONE 20 mg tablet 3 tabs by mouth days 1-5, 2 tabs by mouth days 6-10, 1 tab by mouth days 11-15 (Patient not taking: Reported on 5/12/2023) 30 tablet 0     No current facility-administered medications for this visit         Review of Systems   Unable to perform ROS: acuity of condition (marked dyspnea and increased WOB)         Vital Signs    /60 (BP Location: Left arm, Patient Position: Sitting, Cuff Size: Standard)   Pulse 80   Temp (!) 95 9 °F (35 5 °C) (Temporal)   Resp 18   SpO2 91%     Physical Exam and Objective Data  Physical Exam  Constitutional:       General: He is not in acute distress  Appearance: He is not diaphoretic  Comments: Frail   HENT:      Head: Normocephalic and atraumatic  Right Ear: External ear normal       Left Ear: External ear normal    Eyes:      General:         Right eye: No discharge  Left eye: No discharge  Conjunctiva/sclera: Conjunctivae normal       Pupils: Pupils are equal, round, and reactive to light  Neck:      Trachea: No tracheal deviation  Comments: Thick necked  Cardiovascular:      Rate and Rhythm: Regular rhythm  Tachycardia present  Pulmonary:      Effort: Respiratory distress present  Breath sounds: No stridor  Abdominal:      General: There is no distension  Palpations: Abdomen is soft  Comments: scaphoid   Skin:     General: Skin is warm and dry  Coloration: Skin is pale  Findings: No erythema or rash  Neurological:      General: No focal deficit present  Mental Status: He is oriented to person, place, and time  Mental status is at baseline  Psychiatric:         Mood and Affect: Mood normal          Behavior: Behavior normal          Thought Content: Thought content normal          Judgment: Judgment normal            Radiology and Laboratory:  I personally reviewed and interpreted the following results: none new    35+ minutes was spent face to face with Lina Jane and his family with greater than 50% of the time spent in counseling or coordination of care including discussions of etiology of diagnosis, risks and benefits of treatment, instructions for disease self management, risk factors and risk reduction of disease and patient and family counseling/involvement in care  All of the patient's questions were answered during this discussion

## 2023-05-12 NOTE — TELEPHONE ENCOUNTER
Received call from [facility] [place] [address] [address] with [place]  PT asked them to come in to evaluate his wound on his arm from recent fall  Asking if you will approve would care for his right arm  They will use xeroform and dry arm dressing for one wound, and for the other wound on the same arm they will use triad paste and dry dressing  They will order supplies       Can call Adilia decker at [phone]

## 2023-05-15 ENCOUNTER — OFFICE VISIT (OUTPATIENT)
Dept: PULMONOLOGY | Facility: CLINIC | Age: 78
End: 2023-05-15

## 2023-05-15 VITALS
OXYGEN SATURATION: 98 % | SYSTOLIC BLOOD PRESSURE: 100 MMHG | DIASTOLIC BLOOD PRESSURE: 60 MMHG | BODY MASS INDEX: 27.89 KG/M2 | HEART RATE: 48 BPM | WEIGHT: 184 LBS | HEIGHT: 68 IN | TEMPERATURE: 98 F

## 2023-05-15 DIAGNOSIS — J44.1 COPD EXACERBATION (HCC): ICD-10-CM

## 2023-05-15 DIAGNOSIS — J44.9 CHRONIC OBSTRUCTIVE PULMONARY DISEASE, UNSPECIFIED COPD TYPE (HCC): Primary | ICD-10-CM

## 2023-05-15 DIAGNOSIS — J18.9 PNEUMONIA OF BOTH LOWER LOBES DUE TO INFECTIOUS ORGANISM: ICD-10-CM

## 2023-05-15 DIAGNOSIS — G47.33 OSA ON CPAP: ICD-10-CM

## 2023-05-15 DIAGNOSIS — Z99.89 OSA ON CPAP: ICD-10-CM

## 2023-05-15 RX ORDER — FORMOTEROL FUMARATE 20 UG/2ML
20 SOLUTION RESPIRATORY (INHALATION)
Qty: 180 ML | Refills: 5 | Status: ON HOLD | OUTPATIENT
Start: 2023-05-15 | End: 2023-06-14

## 2023-05-15 NOTE — PROGRESS NOTES
Assessment/Plan:    COPD (chronic obstructive pulmonary disease) (HCC)  Shlomo's breathing is improving since his episodes of recent viral pneumonia throughout the winter  He will maintain on all nebulized solutions Perforomist and budesonide twice daily and DuoNebs 4 times a day  That being said he is interested in endobronchial valves and we will start with full pulmonary function test for an assessment for this  He does need a repeat CAT scan in 6 to 8 weeks after his pneumonia to assure resolution of these consolidative masses which are likely infectious and inflammatory so I scheduled his CAT scan for the end of June  LAMBERTO on CPAP  We will continue to maintain on CPAP all night every night with 3 L of oxygen  Diagnoses and all orders for this visit:    Chronic obstructive pulmonary disease, unspecified COPD type ([facility] )  -     Complete PFT with post bronchodilator; Future    Pneumonia of both lower lobes due to infectious organism  -     CT chest wo contrast; Future    COPD exacerbation ([facility])  -     formoterol (PERFOROMIST) 20 MCG/2ML nebulizer solution; Take 2 mL (20 mcg total) by nebulization every 12 (twelve) hours    LAMBERTO on CPAP          Subjective:      Patient ID: Briana Napoles is a 65 y o  male  General is here for follow-up of his COPD  His breathing has been slowly improving since his recent hospitalizations  He is using 3 L of oxygen during the day and at night  He is using his nebulizers as prescribed and denies any current cough or wheeze  The following portions of the patient's history were reviewed and updated as appropriate: allergies, current medications, past family history, past medical history, past social history, past surgical history and problem list     Review of Systems   Constitutional: Negative  HENT: Negative  Eyes: Negative  Respiratory: Negative for shortness of breath  Cardiovascular: Negative  Gastrointestinal: Negative  Endocrine: Negative  "  Genitourinary: Negative  Allergic/Immunologic: Negative  Neurological: Negative  Hematological: Negative  Psychiatric/Behavioral: Negative  Objective:      /60   Pulse (!) 48   Temp 98 °F (36 7 °C) (Tympanic)   Ht 5' 8\" (1 727 m)   Wt 83 5 kg (184 lb)   SpO2 98%   BMI 27 98 kg/m²          Physical Exam  Constitutional:       Appearance: He is well-developed  HENT:      Head: Normocephalic  Eyes:      Pupils: Pupils are equal, round, and reactive to light  Cardiovascular:      Rate and Rhythm: Normal rate  Heart sounds: No murmur heard  Pulmonary:      Effort: Pulmonary effort is normal  No respiratory distress  Breath sounds: Normal breath sounds  No wheezing or rales  Abdominal:      Palpations: Abdomen is soft  Musculoskeletal:         General: Normal range of motion  Cervical back: Normal range of motion and neck supple  Skin:     General: Skin is warm and dry  Neurological:      Mental Status: He is alert and oriented to person, place, and time           "

## 2023-05-15 NOTE — ASSESSMENT & PLAN NOTE
Shlomo's breathing is improving since his episodes of recent viral pneumonia throughout the winter  He will maintain on all nebulized solutions Perforomist and budesonide twice daily and DuoNebs 4 times a day  That being said he is interested in endobronchial valves and we will start with full pulmonary function test for an assessment for this  He does need a repeat CAT scan in 6 to 8 weeks after his pneumonia to assure resolution of these consolidative masses which are likely infectious and inflammatory so I scheduled his CAT scan for the end of June

## 2023-05-16 ENCOUNTER — TELEPHONE (OUTPATIENT)
Dept: FAMILY MEDICINE CLINIC | Facility: CLINIC | Age: 78
End: 2023-05-16

## 2023-05-16 NOTE — TELEPHONE ENCOUNTER
I believe the weight loss reflects that his fluid status is improving if swelling is down- please ask them to continue to monitor weight and swelling  Also please ask them to complete labs as ordered  Thanks!

## 2023-05-16 NOTE — TELEPHONE ENCOUNTER
Ludivina Grace, home care nurse from [address] [place] [address] called  Wanted Dr Irving Cruz aware that Bibi Kilpatrick has had weight loss, is on a diuretic  He was 191lbs on Friday and is 182lbs today  His swelling is down  She is required to report this weight loss  If there are any concerns, her call back number is [phone]

## 2023-05-17 ENCOUNTER — TELEPHONE (OUTPATIENT)
Dept: PULMONOLOGY | Facility: CLINIC | Age: 78
End: 2023-05-17

## 2023-05-17 NOTE — TELEPHONE ENCOUNTER
Patients daughter looking for Dr Jenni Pepe because she wants to know if Leonid Pack would be a candidate to get the zephyr valve procedure done  He was just seen in the office on 5/15 by Dr Ana Bustos  She would like a call to discuss this  Please advise

## 2023-05-18 NOTE — TELEPHONE ENCOUNTER
Can you please let the daughter know that he needs to schedule his full pulmonary function test that is how we will determine whether he is a candidate for the valves

## 2023-05-19 ENCOUNTER — OFFICE VISIT (OUTPATIENT)
Dept: PODIATRY | Facility: CLINIC | Age: 78
End: 2023-05-19

## 2023-05-19 VITALS
BODY MASS INDEX: 27.83 KG/M2 | DIASTOLIC BLOOD PRESSURE: 55 MMHG | HEIGHT: 68 IN | WEIGHT: 183.6 LBS | OXYGEN SATURATION: 96 % | SYSTOLIC BLOOD PRESSURE: 120 MMHG | HEART RATE: 99 BPM

## 2023-05-19 DIAGNOSIS — B35.1 ONYCHOMYCOSIS: Primary | ICD-10-CM

## 2023-05-19 DIAGNOSIS — I87.2 VENOUS INSUFFICIENCY OF BOTH LOWER EXTREMITIES: ICD-10-CM

## 2023-05-19 RX ORDER — ALBUTEROL SULFATE 1.25 MG/3ML
1.25 SOLUTION RESPIRATORY (INHALATION) EVERY 6 HOURS PRN
Status: ON HOLD | COMMUNITY
End: 2023-05-28

## 2023-05-19 NOTE — PROGRESS NOTES
Assessment/Plan:    No problem-specific Assessment & Plan notes found for this encounter  Diagnoses and all orders for this visit:    Onychomycosis    Venous insufficiency of both lower extremities    Other orders  -     albuterol (ACCUNEB) 1 25 MG/3ML nebulizer solution; Take 1 25 mg by nebulization every 6 (six) hours as needed for wheezing        Plan:      1  A thorough neurovascular exam was performed  Patient presents for at-risk foot care  Patient has no acute concerns today  Patient has significant lower extremity risk due to diminished PT pulses in the feet and trophic skin changes to the lower extremity including thick toenail, atrophic skin, and decreased hair growth  2  All 10 toenails debrided Using nail nipper, wes, and curette, reduced in thickness and length  Devitalized nail tissue and fungal debris excised and removed  Patient tolerated well  3  Patient was educated on importance of daily foot assessment and proper shoe gear  Educational materials were provided  Patient has Q8  findings and is recommended for at risk foot care every 10-12 weeks  I recommended over-the-counter lotion to be applied daily twice a day to bilateral lower extremities  4  Call if any questions or occurrence of any foot and ankle related complications     5  Return in 3 months      Lab Results   Component Value Date    HGBA1C 5 9 (H) 01/27/2023         Subjective:      Patient ID: Andree Coppola is a 65 y o  male  HPI:  Andree Coppola is a 65 y o  male who presents with painful, elongated toenails  They have difficulty applying their socks and shoes due to the elongation of the nails  They have been unable to cut their nails adequately  Patient states pain is 1/10 in shoe gear  Pain with pressure  Requires at risk foot care  Patient recently had hearing loss  He is accompanied by wife and his daughter who is providing most of the history  No other complaints at this time          The following portions of the patient's history were reviewed and updated as appropriate:   He  has a past medical history of Abnormal ECG (2021 OR 2022), Alcoholism ([facility] ) (1984), Arthritis (2008), Bilateral carotid artery stenosis, Callus, Cancer ([place] [address] ), Chronic diastolic heart failure ([place] [address] ), Chronic ischemic heart disease, Chronic kidney disease, Colon polyp, COPD (chronic obstructive pulmonary disease) ([place] [address] ), Coronary artery disease, COVID (11/2021), CPAP (continuous positive airway pressure) dependence, Disease of thyroid gland (2017), Emphysema of lung ([place] [address] ) (1995), Hearing loss, History of transfusion (1995), Hyperlipidemia, Hypertension, Intracranial aneurysm (04/25/2023), Lung nodule (2023), MI (myocardial infarction) ([place] [address] ) (1995), Myocardial infarction ([place] [address] ) (1995), Pneumonia, Pneumothorax (02/20/2023), Prostate cancer ([place] [address] ), RSV (respiratory syncytial virus infection) (10/2022), S/P carotid endarterectomy, Shortness of breath, Sleep apnea, Sleep apnea, obstructive, and Stented coronary artery    He   Patient Active Problem List    Diagnosis Date Noted   • Dysphagia 05/04/2023   • Congestive heart failure with left ventricular systolic dysfunction (LVSD) ([place] [address] ) 04/28/2023   • Hypoxemia 04/28/2023   • Intracranial aneurysm 04/25/2023   • Elevated troponin 04/04/2023   • Sepsis due to pneumonia ([place] [address] ) 04/03/2023   • Orthopnea 03/21/2023   • Lower extremity edema 03/21/2023   • Irregular heart rhythm 03/21/2023   • Pneumothorax on right 02/21/2023   • Leukocytosis 02/21/2023   • Left leg pain 01/25/2023   • Transient right leg weakness 01/25/2023   • Hypokalemia 01/11/2023   • COVID 01/09/2023   • Ambulatory dysfunction 01/09/2023   • COPD (chronic obstructive pulmonary disease) ([facility] ) 01/09/2023   • Right lower quadrant abdominal pain 10/23/2022   • RSV (respiratory syncytial virus infection) 10/21/2022   • Obstructive sleep apnea syndrome in adult 10/19/2022   • Sensorineural hearing loss, bilateral 10/19/2022   • Acute on chronic respiratory failure with hypoxia ([facility] ) 10/15/2022   • Prostate cancer ([facility] ) 09/27/2022   • Elevated MCV 09/08/2022   • Cubital tunnel syndrome on left 07/22/2022   • Carpal tunnel syndrome on left 07/22/2022   • 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   • Urinary frequency 04/27/2022   • Incomplete bladder emptying 04/27/2022   • Chronic bilateral low back pain with right-sided sciatica 04/18/2022   • Mid back pain 04/18/2022   • Thoracic radiculopathy 04/18/2022   • Chronic pain syndrome 04/18/2022   • Stage 3a chronic kidney disease ([facility] ) 04/14/2022   • Hoarseness or changing voice 04/14/2022   • Chronic right-sided thoracic back pain 03/05/2022   • Primary insomnia 03/02/2022   • Right hip pain 01/20/2022   • Abnormal nuclear stress test 03/18/2021   • Chest pain 02/21/2021   • Costochondral chest pain 01/15/2021   • Chronic obstructive pulmonary disease with acute exacerbation ([facility] ) 01/11/2021   • Oxygen dependent 01/11/2021   • S/P carotid endarterectomy 01/11/2021   • Stented coronary artery 01/11/2021   • Vertigo 01/11/2021   • Anisometropia 01/11/2021   • Osteoarthritis of spinal facet joint 01/11/2021   • Chronic ischemic heart disease 01/11/2021   • Chronic diastolic (congestive) heart failure ([facility] ) 01/11/2021   • Diverticular disease of colon 01/11/2021   • Dry eye syndrome 01/11/2021   • GERD (gastroesophageal reflux disease) 01/11/2021   • Acute hearing loss, right 01/11/2021   • Elevated PSA 01/11/2021   • Hypoxia 01/11/2021   • Lens replaced by other means 01/11/2021   • Lumbar radiculopathy 01/11/2021   • Multinodular goiter 01/11/2021   • Nuclear senile cataract 01/11/2021   • Obesity 01/11/2021   • Occlusion and stenosis of carotid artery 01/11/2021   • Osteoarthritis of hip 01/11/2021   • Prediabetes 01/11/2021   • LAMBERTO on CPAP 01/11/2021   • Solitary pulmonary nodule 01/11/2021   • Thyroid nodule 01/11/2021   • Guyon syndrome, 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   • Depression, recurrent ([facility] )    • "Hyperlipidemia    • Coronary artery disease involving native coronary artery of native heart without angina pectoris    • Left carotid artery occlusion    • Hypertension      He  has a past surgical history that includes Colonoscopy; Skin cancer excision (2012); Eye surgery; Cardiac catheterization (03/18/2021); Coronary angioplasty with stent (1999); Coronary angioplasty with stent (2001); Coronary angioplasty with stent (2003); Appendectomy; pr neuroplasty &/transposition ulnar nerve elbow (Left, 07/22/2022); pr neuroplasty &/transpos median nrv carpal tunne (Left, 07/22/2022); pr neuroplasty &/transposition ulnar nerve wrist (Left, 07/22/2022); pr bx prostate strtctc saturation sampling img gid (N/A, 09/13/2022); Cataract extraction, bilateral (Bilateral); and Tonsillectomy (no)       Review of Systems   All other systems reviewed and are negative  Objective:      /55 (BP Location: Left arm, Patient Position: Sitting, Cuff Size: Standard)   Pulse 99   Ht 5' 8\" (1 727 m)   Wt 83 3 kg (183 lb 9 6 oz)   SpO2 96% Comment: 3LPM  BMI 27 92 kg/m²          Physical Exam  Vitals reviewed  Cardiovascular:      Pulses:           Dorsalis pedis pulses are 1+ on the right side and 1+ on the left side  Posterior tibial pulses are 0 on the right side and 0 on the left side  Feet:      Right foot:      Protective Sensation: 10 sites tested  8 sites sensed  Skin integrity: Dry skin present  Toenail Condition: Right toenails are abnormally thick and long  Fungal disease present  Left foot:      Protective Sensation: 10 sites tested  7 sites sensed  Skin integrity: Dry skin present  Toenail Condition: Left toenails are abnormally thick and long  Fungal disease present  Comments: On exam patient has thickened, hypertrophic, discolored, brittle toenails with subungual debris and tenderness x10     Patient has diminished pedal pulses and decreased perfusion to the lower " extremities  Patient has significant trophic changes to the skin including thick toenails, decreased pedal hair and atrophic skin

## 2023-05-22 ENCOUNTER — TELEPHONE (OUTPATIENT)
Dept: FAMILY MEDICINE CLINIC | Facility: CLINIC | Age: 78
End: 2023-05-22

## 2023-05-22 ENCOUNTER — RA CDI HCC (OUTPATIENT)
Dept: OTHER | Facility: HOSPITAL | Age: 78
End: 2023-05-22

## 2023-05-22 NOTE — TELEPHONE ENCOUNTER
Spoke with patients wife  She doesn't think he passed out  He feels fine  She said if this happens again she will take him to ED

## 2023-05-22 NOTE — TELEPHONE ENCOUNTER
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

## 2023-05-22 NOTE — PROGRESS NOTES
[facility]  coding opportunities        I13 0 and I42 9  Chart Reviewed number of suggestions sent to Provider: 2     Patients Insurance     Medicare Insurance: [address] [place] [address]

## 2023-05-25 DIAGNOSIS — R07.9 CHEST PAIN, UNSPECIFIED TYPE: ICD-10-CM

## 2023-05-25 RX ORDER — METOPROLOL SUCCINATE 25 MG/1
12.5 TABLET, EXTENDED RELEASE ORAL DAILY
Qty: 45 TABLET | Refills: 3 | Status: SHIPPED | OUTPATIENT
Start: 2023-05-25

## 2023-05-26 ENCOUNTER — OFFICE VISIT (OUTPATIENT)
Dept: FAMILY MEDICINE CLINIC | Facility: CLINIC | Age: 78
End: 2023-05-26

## 2023-05-26 ENCOUNTER — APPOINTMENT (OUTPATIENT)
Dept: LAB | Facility: CLINIC | Age: 78
End: 2023-05-26

## 2023-05-26 VITALS
HEART RATE: 91 BPM | HEIGHT: 68 IN | WEIGHT: 181.4 LBS | BODY MASS INDEX: 27.49 KG/M2 | RESPIRATION RATE: 18 BRPM | OXYGEN SATURATION: 91 % | SYSTOLIC BLOOD PRESSURE: 120 MMHG | TEMPERATURE: 98.2 F | DIASTOLIC BLOOD PRESSURE: 70 MMHG

## 2023-05-26 DIAGNOSIS — I10 PRIMARY HYPERTENSION: Chronic | ICD-10-CM

## 2023-05-26 DIAGNOSIS — R71.8 ELEVATED MCV: ICD-10-CM

## 2023-05-26 DIAGNOSIS — R26.81 UNSTEADINESS ON FEET: ICD-10-CM

## 2023-05-26 DIAGNOSIS — R25.1 TREMOR: ICD-10-CM

## 2023-05-26 DIAGNOSIS — G47.33 OSA ON CPAP: ICD-10-CM

## 2023-05-26 DIAGNOSIS — R13.10 DYSPHAGIA, UNSPECIFIED TYPE: Primary | ICD-10-CM

## 2023-05-26 DIAGNOSIS — R55 SYNCOPE, UNSPECIFIED SYNCOPE TYPE: ICD-10-CM

## 2023-05-26 DIAGNOSIS — J44.9 CHRONIC OBSTRUCTIVE PULMONARY DISEASE, UNSPECIFIED COPD TYPE (HCC): ICD-10-CM

## 2023-05-26 DIAGNOSIS — N18.31 STAGE 3A CHRONIC KIDNEY DISEASE (HCC): ICD-10-CM

## 2023-05-26 DIAGNOSIS — Z99.89 OSA ON CPAP: ICD-10-CM

## 2023-05-26 DIAGNOSIS — I49.9 IRREGULAR HEART BEAT: ICD-10-CM

## 2023-05-26 DIAGNOSIS — F41.9 ANXIETY: ICD-10-CM

## 2023-05-26 DIAGNOSIS — J96.11 CHRONIC RESPIRATORY FAILURE WITH HYPOXIA (HCC): ICD-10-CM

## 2023-05-26 LAB
ALBUMIN SERPL BCP-MCNC: 3 G/DL (ref 3.5–5)
ALP SERPL-CCNC: 80 U/L (ref 46–116)
ALT SERPL W P-5'-P-CCNC: 30 U/L (ref 12–78)
ANION GAP SERPL CALCULATED.3IONS-SCNC: 0 MMOL/L (ref 4–13)
AST SERPL W P-5'-P-CCNC: 30 U/L (ref 5–45)
BASOPHILS # BLD AUTO: 0.07 THOUSANDS/ÂΜL (ref 0–0.1)
BASOPHILS NFR BLD AUTO: 1 % (ref 0–1)
BILIRUB SERPL-MCNC: 0.66 MG/DL (ref 0.2–1)
BUN SERPL-MCNC: 16 MG/DL (ref 5–25)
CALCIUM ALBUM COR SERPL-MCNC: 9.8 MG/DL (ref 8.3–10.1)
CALCIUM SERPL-MCNC: 9 MG/DL (ref 8.3–10.1)
CHLORIDE SERPL-SCNC: 102 MMOL/L (ref 96–108)
CO2 SERPL-SCNC: 35 MMOL/L (ref 21–32)
CREAT SERPL-MCNC: 1.61 MG/DL (ref 0.6–1.3)
EOSINOPHIL # BLD AUTO: 0.39 THOUSAND/ÂΜL (ref 0–0.61)
EOSINOPHIL NFR BLD AUTO: 4 % (ref 0–6)
ERYTHROCYTE [DISTWIDTH] IN BLOOD BY AUTOMATED COUNT: 15.6 % (ref 11.6–15.1)
GFR SERPL CREATININE-BSD FRML MDRD: 40 ML/MIN/1.73SQ M
GLUCOSE P FAST SERPL-MCNC: 100 MG/DL (ref 65–99)
HCT VFR BLD AUTO: 42.7 % (ref 36.5–49.3)
HGB BLD-MCNC: 13.8 G/DL (ref 12–17)
IMM GRANULOCYTES # BLD AUTO: 0.05 THOUSAND/UL (ref 0–0.2)
IMM GRANULOCYTES NFR BLD AUTO: 1 % (ref 0–2)
LYMPHOCYTES # BLD AUTO: 0.97 THOUSANDS/ÂΜL (ref 0.6–4.47)
LYMPHOCYTES NFR BLD AUTO: 10 % (ref 14–44)
MCH RBC QN AUTO: 31.3 PG (ref 26.8–34.3)
MCHC RBC AUTO-ENTMCNC: 32.3 G/DL (ref 31.4–37.4)
MCV RBC AUTO: 97 FL (ref 82–98)
MONOCYTES # BLD AUTO: 1.16 THOUSAND/ÂΜL (ref 0.17–1.22)
MONOCYTES NFR BLD AUTO: 13 % (ref 4–12)
NEUTROPHILS # BLD AUTO: 6.66 THOUSANDS/ÂΜL (ref 1.85–7.62)
NEUTS SEG NFR BLD AUTO: 71 % (ref 43–75)
NRBC BLD AUTO-RTO: 0 /100 WBCS
PLATELET # BLD AUTO: 192 THOUSANDS/UL (ref 149–390)
PMV BLD AUTO: 10.8 FL (ref 8.9–12.7)
POTASSIUM SERPL-SCNC: 2.9 MMOL/L (ref 3.5–5.3)
PROT SERPL-MCNC: 6.5 G/DL (ref 6.4–8.4)
RBC # BLD AUTO: 4.41 MILLION/UL (ref 3.88–5.62)
SODIUM SERPL-SCNC: 137 MMOL/L (ref 135–147)
TSH SERPL DL<=0.05 MIU/L-ACNC: 0.58 UIU/ML (ref 0.45–4.5)
VIT B12 SERPL-MCNC: 425 PG/ML (ref 180–914)
WBC # BLD AUTO: 9.3 THOUSAND/UL (ref 4.31–10.16)

## 2023-05-26 RX ORDER — ALPRAZOLAM 0.25 MG/1
0.25 TABLET ORAL 2 TIMES DAILY PRN
Qty: 15 TABLET | Refills: 0 | Status: SHIPPED | OUTPATIENT
Start: 2023-05-26

## 2023-05-26 NOTE — PROGRESS NOTES
Assessment/Plan:       Problem List Items Addressed This Visit        Digestive    Dysphagia - Primary       Respiratory    LAMBERTO on CPAP    Chronic respiratory failure with hypoxia (HCC)    COPD (chronic obstructive pulmonary disease) (HCC)       Cardiovascular and Mediastinum    Hypertension (Chronic)    Relevant Orders    TSH, 3rd generation with Free T4 reflex (Completed)    Vitamin B12 (Completed)       Genitourinary    Stage 3a chronic kidney disease (HCC) (Chronic)    Relevant Orders    CBC and differential (Completed)    Comprehensive metabolic panel (Completed)       Other    Elevated MCV    Relevant Orders    Vitamin B12 (Completed)   Other Visit Diagnoses     Tremor        Relevant Orders    TSH, 3rd generation with Free T4 reflex (Completed)    Vitamin B12 (Completed)    Syncope, unspecified syncope type        Irregular heart beat        Unsteadiness on feet        Relevant Orders    Vitamin B12 (Completed)    Anxiety        Relevant Medications    ALPRAZolam (XANAX) 0 25 mg tablet            Advised given multiple other concerns/medical issues currently would hold off on changing dysphagia diet and revisiting sleep study since this is helping so far  He is agreeable  Tremor inconsistent, not just with rest or motion, and more so later on in day  Given this, I think more likely medication side effect versus Parkinson's or essential tremor  Will monitor  Blood work ordered  Advised to watch blood pressure and heart rate closely, I am concerned about syncopal episode and fall risk  Advised to reach out to Dr Dalia Corona to make him aware this occurred, for sooner follow-up  Use antibiotic cream for abrasion on knee  We discussed starting an SSRI such as Celexa to help with anxiety and irritability symptoms, he will consider  Continue Xanax sparingly PRN for now  Close follow-up in a couple weeks       I have spent a total time of 50 minutes on 05/26/23 in caring for this patient including Diagnostic "results, Risks and benefits of tx options, Instructions for management, Patient and family education, Importance of tx compliance, Risk factor reductions, Impressions, Counseling / Coordination of care, Documenting in the medical record, Reviewing / ordering tests, medicine, procedures   and Obtaining or reviewing history    Subjective:      Patient ID: Madison Grijalva is a 77 y o  male  HPI     Dysphagia- Thickening agent is working well, no further coughing with it  He is inquiring about repeat swallow study to see if he still needs this  Tremor- Right more than left, sometimes at rest, some nights more with action  Most mornings okay, more as day goes on develops  Wondering if nebulizer side effects  2-3 weeks or so started  Doing Duoneb 3 times daily  Episode of syncope and falls recently, doesn't recall what happened, thinks he passed out  Nothing in past couple days  His wife tried to catch him, but he fell to the floor, hit his knee  No symptoms since  Feels frustrated recently, can't do what he wants to do, can't hear  More irritable  The following portions of the patient's history were reviewed and updated as appropriate: allergies, current medications, past family history, past medical history, past social history, past surgical history, and problem list     Review of Systems   All other systems reviewed and are negative  Objective:      /70   Pulse 91   Temp 98 2 °F (36 8 °C)   Resp 18   Ht 5' 8\" (1 727 m)   Wt 82 3 kg (181 lb 6 4 oz)   SpO2 91%   BMI 27 58 kg/m²          Physical Exam  Vitals reviewed  Constitutional:       General: He is not in acute distress  Appearance: Normal appearance  He is not ill-appearing, toxic-appearing or diaphoretic  HENT:      Head: Normocephalic and atraumatic  Eyes:      General:         Right eye: No discharge  Left eye: No discharge  Extraocular Movements: Extraocular movements intact        " Conjunctiva/sclera: Conjunctivae normal    Cardiovascular:      Rate and Rhythm: Normal rate and regular rhythm  Heart sounds: No murmur heard  No friction rub  No gallop  Pulmonary:      Effort: Pulmonary effort is normal  No respiratory distress  Breath sounds: No stridor  No wheezing or rhonchi  Comments: Diminished breath sounds, trace crackles  Musculoskeletal:         General: No swelling, tenderness or signs of injury  Right lower leg: No edema  Left lower leg: No edema  Skin:     General: Skin is warm  Coloration: Skin is not pale  Findings: No erythema or rash  Neurological:      Mental Status: He is alert and oriented to person, place, and time  Motor: No weakness     Psychiatric:         Mood and Affect: Mood normal          Behavior: Behavior normal              DO Kaleb Kolb [address] [place] [address] Primary Care

## 2023-05-28 ENCOUNTER — HOSPITAL ENCOUNTER (INPATIENT)
Facility: HOSPITAL | Age: 78
LOS: 2 days | Discharge: HOME HEALTH | End: 2023-05-30
Attending: FAMILY MEDICINE | Admitting: INTERNAL MEDICINE

## 2023-05-28 ENCOUNTER — TELEPHONE (OUTPATIENT)
Dept: FAMILY MEDICINE CLINIC | Facility: CLINIC | Age: 78
End: 2023-05-28

## 2023-05-28 ENCOUNTER — APPOINTMENT (EMERGENCY)
Dept: CT IMAGING | Facility: HOSPITAL | Age: 78
End: 2023-05-28

## 2023-05-28 ENCOUNTER — APPOINTMENT (EMERGENCY)
Dept: RADIOLOGY | Facility: HOSPITAL | Age: 78
End: 2023-05-28

## 2023-05-28 DIAGNOSIS — I25.9 CHRONIC ISCHEMIC HEART DISEASE: ICD-10-CM

## 2023-05-28 DIAGNOSIS — E87.6 HYPOKALEMIA: ICD-10-CM

## 2023-05-28 DIAGNOSIS — R25.1 TREMOR: ICD-10-CM

## 2023-05-28 DIAGNOSIS — R53.1 WEAKNESS: ICD-10-CM

## 2023-05-28 DIAGNOSIS — R25.2 MUSCLE CRAMPING: ICD-10-CM

## 2023-05-28 DIAGNOSIS — N17.9 ACUTE RENAL FAILURE SUPERIMPOSED ON STAGE 3A CHRONIC KIDNEY DISEASE, UNSPECIFIED ACUTE RENAL FAILURE TYPE (HCC): ICD-10-CM

## 2023-05-28 DIAGNOSIS — R53.1 GENERALIZED WEAKNESS: ICD-10-CM

## 2023-05-28 DIAGNOSIS — N18.31 ACUTE RENAL FAILURE SUPERIMPOSED ON STAGE 3A CHRONIC KIDNEY DISEASE, UNSPECIFIED ACUTE RENAL FAILURE TYPE (HCC): ICD-10-CM

## 2023-05-28 DIAGNOSIS — N17.9 AKI (ACUTE KIDNEY INJURY) (HCC): Primary | ICD-10-CM

## 2023-05-28 DIAGNOSIS — N17.9 ACUTE KIDNEY INJURY (HCC): Primary | ICD-10-CM

## 2023-05-28 LAB
ANION GAP SERPL CALCULATED.3IONS-SCNC: 10 MMOL/L (ref 4–13)
ANION GAP SERPL CALCULATED.3IONS-SCNC: 6 MMOL/L (ref 4–13)
BACTERIA UR QL AUTO: NORMAL /HPF
BASOPHILS # BLD AUTO: 0.04 THOUSANDS/ÂΜL (ref 0–0.1)
BASOPHILS NFR BLD AUTO: 1 % (ref 0–1)
BILIRUB UR QL STRIP: NEGATIVE
BNP SERPL-MCNC: 111 PG/ML (ref 0–100)
BUN SERPL-MCNC: 13 MG/DL (ref 5–25)
BUN SERPL-MCNC: 14 MG/DL (ref 5–25)
CALCIUM SERPL-MCNC: 8.2 MG/DL (ref 8.4–10.2)
CALCIUM SERPL-MCNC: 8.9 MG/DL (ref 8.4–10.2)
CHLORIDE SERPL-SCNC: 100 MMOL/L (ref 96–108)
CHLORIDE SERPL-SCNC: 95 MMOL/L (ref 96–108)
CLARITY UR: CLEAR
CO2 SERPL-SCNC: 35 MMOL/L (ref 21–32)
CO2 SERPL-SCNC: 37 MMOL/L (ref 21–32)
COLOR UR: YELLOW
CREAT SERPL-MCNC: 1.55 MG/DL (ref 0.6–1.3)
CREAT SERPL-MCNC: 1.56 MG/DL (ref 0.6–1.3)
EOSINOPHIL # BLD AUTO: 0.29 THOUSAND/ÂΜL (ref 0–0.61)
EOSINOPHIL NFR BLD AUTO: 4 % (ref 0–6)
ERYTHROCYTE [DISTWIDTH] IN BLOOD BY AUTOMATED COUNT: 15.2 % (ref 11.6–15.1)
GFR SERPL CREATININE-BSD FRML MDRD: 41 ML/MIN/1.73SQ M
GFR SERPL CREATININE-BSD FRML MDRD: 41 ML/MIN/1.73SQ M
GLUCOSE SERPL-MCNC: 107 MG/DL (ref 65–140)
GLUCOSE SERPL-MCNC: 112 MG/DL (ref 65–140)
GLUCOSE SERPL-MCNC: 134 MG/DL (ref 65–140)
GLUCOSE UR STRIP-MCNC: NEGATIVE MG/DL
HCT VFR BLD AUTO: 43.6 % (ref 36.5–49.3)
HGB BLD-MCNC: 13.8 G/DL (ref 12–17)
HGB UR QL STRIP.AUTO: ABNORMAL
IMM GRANULOCYTES # BLD AUTO: 0.03 THOUSAND/UL (ref 0–0.2)
IMM GRANULOCYTES NFR BLD AUTO: 0 % (ref 0–2)
KETONES UR STRIP-MCNC: NEGATIVE MG/DL
LEUKOCYTE ESTERASE UR QL STRIP: NEGATIVE
LYMPHOCYTES # BLD AUTO: 1.02 THOUSANDS/ÂΜL (ref 0.6–4.47)
LYMPHOCYTES NFR BLD AUTO: 13 % (ref 14–44)
MAGNESIUM SERPL-MCNC: 2.3 MG/DL (ref 1.9–2.7)
MCH RBC QN AUTO: 31 PG (ref 26.8–34.3)
MCHC RBC AUTO-ENTMCNC: 31.7 G/DL (ref 31.4–37.4)
MCV RBC AUTO: 98 FL (ref 82–98)
MONOCYTES # BLD AUTO: 0.94 THOUSAND/ÂΜL (ref 0.17–1.22)
MONOCYTES NFR BLD AUTO: 12 % (ref 4–12)
NEUTROPHILS # BLD AUTO: 5.36 THOUSANDS/ÂΜL (ref 1.85–7.62)
NEUTS SEG NFR BLD AUTO: 70 % (ref 43–75)
NITRITE UR QL STRIP: NEGATIVE
NON-SQ EPI CELLS URNS QL MICRO: NORMAL /HPF
NRBC BLD AUTO-RTO: 0 /100 WBCS
PH UR STRIP.AUTO: 6 [PH]
PLATELET # BLD AUTO: 177 THOUSANDS/UL (ref 149–390)
PMV BLD AUTO: 10 FL (ref 8.9–12.7)
POTASSIUM SERPL-SCNC: 2.4 MMOL/L (ref 3.5–5.3)
POTASSIUM SERPL-SCNC: 2.8 MMOL/L (ref 3.5–5.3)
PROT UR STRIP-MCNC: NEGATIVE MG/DL
RBC # BLD AUTO: 4.45 MILLION/UL (ref 3.88–5.62)
RBC #/AREA URNS AUTO: NORMAL /HPF
SODIUM SERPL-SCNC: 140 MMOL/L (ref 135–147)
SODIUM SERPL-SCNC: 143 MMOL/L (ref 135–147)
SP GR UR STRIP.AUTO: 1.01
UROBILINOGEN UR QL STRIP.AUTO: 0.2 E.U./DL
WBC # BLD AUTO: 7.68 THOUSAND/UL (ref 4.31–10.16)
WBC #/AREA URNS AUTO: NORMAL /HPF

## 2023-05-28 RX ORDER — GABAPENTIN 300 MG/1
300 CAPSULE ORAL
Status: DISCONTINUED | OUTPATIENT
Start: 2023-05-28 | End: 2023-05-30 | Stop reason: HOSPADM

## 2023-05-28 RX ORDER — HEPARIN SODIUM 5000 [USP'U]/ML
5000 INJECTION, SOLUTION INTRAVENOUS; SUBCUTANEOUS EVERY 8 HOURS SCHEDULED
Status: DISCONTINUED | OUTPATIENT
Start: 2023-05-28 | End: 2023-05-30 | Stop reason: HOSPADM

## 2023-05-28 RX ORDER — AZITHROMYCIN 250 MG/1
500 TABLET, FILM COATED ORAL 3 TIMES WEEKLY
COMMUNITY

## 2023-05-28 RX ORDER — FAMOTIDINE 20 MG/1
20 TABLET, FILM COATED ORAL
Status: DISCONTINUED | OUTPATIENT
Start: 2023-05-28 | End: 2023-05-30 | Stop reason: HOSPADM

## 2023-05-28 RX ORDER — ONDANSETRON 2 MG/ML
4 INJECTION INTRAMUSCULAR; INTRAVENOUS EVERY 6 HOURS PRN
Status: DISCONTINUED | OUTPATIENT
Start: 2023-05-28 | End: 2023-05-30 | Stop reason: HOSPADM

## 2023-05-28 RX ORDER — POTASSIUM CHLORIDE 14.9 MG/ML
20 INJECTION INTRAVENOUS
Status: COMPLETED | OUTPATIENT
Start: 2023-05-29 | End: 2023-05-29

## 2023-05-28 RX ORDER — ACETAMINOPHEN 325 MG/1
650 TABLET ORAL EVERY 4 HOURS PRN
Status: DISCONTINUED | OUTPATIENT
Start: 2023-05-28 | End: 2023-05-30 | Stop reason: HOSPADM

## 2023-05-28 RX ORDER — AZITHROMYCIN 250 MG/1
500 TABLET, FILM COATED ORAL EVERY 24 HOURS
Status: DISCONTINUED | OUTPATIENT
Start: 2023-05-28 | End: 2023-05-30 | Stop reason: HOSPADM

## 2023-05-28 RX ORDER — IPRATROPIUM BROMIDE AND ALBUTEROL SULFATE 2.5; .5 MG/3ML; MG/3ML
3 SOLUTION RESPIRATORY (INHALATION) 4 TIMES DAILY
Status: DISCONTINUED | OUTPATIENT
Start: 2023-05-28 | End: 2023-05-28

## 2023-05-28 RX ORDER — ASPIRIN 81 MG/1
81 TABLET, CHEWABLE ORAL
Status: DISCONTINUED | OUTPATIENT
Start: 2023-05-28 | End: 2023-05-30 | Stop reason: HOSPADM

## 2023-05-28 RX ORDER — POTASSIUM CHLORIDE 20 MEQ/1
40 TABLET, EXTENDED RELEASE ORAL ONCE
Status: COMPLETED | OUTPATIENT
Start: 2023-05-28 | End: 2023-05-28

## 2023-05-28 RX ORDER — POTASSIUM CHLORIDE 750 MG/1
20 TABLET, EXTENDED RELEASE ORAL DAILY
Status: DISCONTINUED | OUTPATIENT
Start: 2023-05-28 | End: 2023-05-30

## 2023-05-28 RX ORDER — LEVALBUTEROL INHALATION SOLUTION 1.25 MG/3ML
1.25 SOLUTION RESPIRATORY (INHALATION)
Status: DISCONTINUED | OUTPATIENT
Start: 2023-05-28 | End: 2023-05-30 | Stop reason: HOSPADM

## 2023-05-28 RX ORDER — POTASSIUM CHLORIDE 14.9 MG/ML
20 INJECTION INTRAVENOUS
Status: COMPLETED | OUTPATIENT
Start: 2023-05-28 | End: 2023-05-28

## 2023-05-28 RX ORDER — METOPROLOL SUCCINATE 25 MG/1
12.5 TABLET, EXTENDED RELEASE ORAL DAILY
Status: DISCONTINUED | OUTPATIENT
Start: 2023-05-28 | End: 2023-05-30 | Stop reason: HOSPADM

## 2023-05-28 RX ORDER — ATORVASTATIN CALCIUM 80 MG/1
80 TABLET, FILM COATED ORAL
Status: DISCONTINUED | OUTPATIENT
Start: 2023-05-28 | End: 2023-05-30 | Stop reason: HOSPADM

## 2023-05-28 RX ORDER — FORMOTEROL FUMARATE 20 UG/2ML
20 SOLUTION RESPIRATORY (INHALATION)
Status: DISCONTINUED | OUTPATIENT
Start: 2023-05-28 | End: 2023-05-30 | Stop reason: HOSPADM

## 2023-05-28 RX ADMIN — POTASSIUM CHLORIDE 40 MEQ: 1500 TABLET, EXTENDED RELEASE ORAL at 13:51

## 2023-05-28 RX ADMIN — POTASSIUM CHLORIDE 20 MEQ: 14.9 INJECTION, SOLUTION INTRAVENOUS at 14:24

## 2023-05-28 RX ADMIN — HEPARIN SODIUM 5000 UNITS: 5000 INJECTION INTRAVENOUS; SUBCUTANEOUS at 21:36

## 2023-05-28 RX ADMIN — FORMOTEROL FUMARATE DIHYDRATE 20 MCG: 20 SOLUTION RESPIRATORY (INHALATION) at 21:11

## 2023-05-28 RX ADMIN — IPRATROPIUM BROMIDE 0.5 MG: 0.5 SOLUTION RESPIRATORY (INHALATION) at 20:16

## 2023-05-28 RX ADMIN — POTASSIUM CHLORIDE 20 MEQ: 750 TABLET, EXTENDED RELEASE ORAL at 15:46

## 2023-05-28 RX ADMIN — FAMOTIDINE 20 MG: 20 TABLET, FILM COATED ORAL at 21:36

## 2023-05-28 RX ADMIN — HEPARIN SODIUM 5000 UNITS: 5000 INJECTION INTRAVENOUS; SUBCUTANEOUS at 15:48

## 2023-05-28 RX ADMIN — GABAPENTIN 300 MG: 300 CAPSULE ORAL at 21:36

## 2023-05-28 RX ADMIN — POTASSIUM CHLORIDE 20 MEQ: 14.9 INJECTION, SOLUTION INTRAVENOUS at 12:15

## 2023-05-28 RX ADMIN — ASPIRIN 81 MG 81 MG: 81 TABLET ORAL at 21:36

## 2023-05-28 RX ADMIN — LEVALBUTEROL HYDROCHLORIDE 1.25 MG: 1.25 SOLUTION RESPIRATORY (INHALATION) at 20:16

## 2023-05-28 RX ADMIN — SODIUM CHLORIDE 1000 ML: 0.9 INJECTION, SOLUTION INTRAVENOUS at 11:58

## 2023-05-28 RX ADMIN — ATORVASTATIN CALCIUM 80 MG: 80 TABLET, FILM COATED ORAL at 15:46

## 2023-05-28 NOTE — ASSESSMENT & PLAN NOTE
Lab Results   Component Value Date    CREATININE 1 56 (H) 05/28/2023    CREATININE 1 61 (H) 05/26/2023    CREATININE 0 85 04/28/2023    EGFR 41 05/28/2023    EGFR 40 05/26/2023    EGFR 83 04/28/2023     · Creatinine of 1 56 on admission- baseline appears to be around 0 8    · Likely due to furosemide use- will hold for now  · Patient received 1 L NSS in the ED  · Will hold off on additional IV fluids for now  · Avoid nephrotoxins and hypotension  · Labs in am

## 2023-05-28 NOTE — ED PROVIDER NOTES
History  Chief Complaint   Patient presents with   • Abnormal Lab     Abnormal potassium and renal      HPI  69-year-old male with history of prostate cancer has not been on treatment presented to ED with complaint of abnormal labs  Patient states he had labs done on Friday by his PCP received call this morning stating that his potassium was low and recommended to come to the ED  Patient is completely deaf and history is taken from patient and daughter and wife was by bedside  Communicated with the patient by writing on daughter's phone  Denies any chest pain shortness of breath the daughter does states that he has tremor ongoing for few months  Denies any chest pain or sob  Patient also states that he has been feeling weak  Prior to Admission Medications   Prescriptions Last Dose Informant Patient Reported? Taking? ALPRAZolam (XANAX) 0 25 mg tablet   No No   Sig: Take 1 tablet (0 25 mg total) by mouth 2 (two) times a day as needed for anxiety   acetaminophen (TYLENOL) 325 mg tablet  Self Yes No   Sig: Take 650 mg by mouth every 6 (six) hours as needed   Patient not taking: Reported on 2023   albuterol (ACCUNEB) 1 25 MG/3ML nebulizer solution  Self Yes No   Sig: Take 1 25 mg by nebulization every 6 (six) hours as needed for wheezing   Patient not taking: Reported on 2023   aspirin 81 mg chewable tablet  Self Yes No   Si mg daily at bedtime   azithromycin (ZITHROMAX) 250 mg tablet   Yes Yes   Sig: Take 500 mg by mouth every 24 hours   budesonide (PULMICORT) 0 5 mg/2 mL nebulizer solution  Self No No   Sig: Take 2 mL (0 5 mg total) by nebulization every 12 (twelve) hours Rinse mouth after use     Patient not taking: Reported on 2023   dextromethorphan-guaiFENesin (ROBITUSSIN DM)  mg/5 mL syrup  Self No No   Sig: Take 5 mL by mouth 3 (three) times a day as needed for cough   famotidine (PEPCID) 20 mg tablet  Self No No   Sig: Take 1 tablet (20 mg total) by mouth daily at bedtime fluticasone (FLONASE) 50 mcg/act nasal spray  Self Yes No   Si spray into each nostril daily   Patient not taking: Reported on 2023   formoterol (PERFOROMIST) 20 MCG/2ML nebulizer solution   No No   Sig: Take 2 mL (20 mcg total) by nebulization every 12 (twelve) hours   furosemide (LASIX) 40 mg tablet  Self No No   Sig: Take 1 tablet (40 mg total) by mouth daily   gabapentin (NEURONTIN) 300 mg capsule  Self No No   Sig: Take 1 capsule (300 mg total) by mouth daily at bedtime   ipratropium-albuterol (DUO-NEB) 0 5-2 5 mg/3 mL nebulizer solution  Self No No   Sig: Take 3 mL by nebulization 4 (four) times a day   metoprolol succinate (TOPROL-XL) 25 mg 24 hr tablet   No No   Sig: Take 0 5 tablets (12 5 mg total) by mouth daily   oxygen gas  Self Yes No   Sig: Inhale 2 L/min continuous 2LPM at rest and 3-4 LPM with activity per D Cedric FANG notes   potassium chloride (Klor-Con) 10 mEq tablet  Self No No   Sig: Take 2 tablets (20 mEq total) by mouth daily   predniSONE 20 mg tablet  Self No No   Sig: 3 tabs by mouth days 1-5, 2 tabs by mouth days 6-10, 1 tab by mouth days 11-15   Patient not taking: Reported on 2023   rosuvastatin (CRESTOR) 40 MG tablet  Self No No   Sig: TAKE 1 TABLET DAILY   Patient taking differently: Take 40 mg by mouth daily at bedtime      Facility-Administered Medications: None       Past Medical History:   Diagnosis Date   • Abnormal ECG  OR    • Alcoholism ([facility] [address] ) 1984    sober 38 years   • Arthritis 2008    beginning   • Bilateral carotid artery stenosis    • Callus    • Cancer (HCC)     skin   • Chronic diastolic heart failure (HCC)    • Chronic ischemic heart disease    • Chronic kidney disease     stage 3   • Colon polyp    • COPD (chronic obstructive pulmonary disease) (HCC)    • Coronary artery disease     hx stents, MI, PCI   • COVID 2021   • CPAP (continuous positive airway pressure) dependence    • Disease of thyroid gland 2017    nodules   • Emphysema of lung [facility]) 1995    started   • Hearing loss    • History of transfusion 1995   • Hyperlipidemia    • Hypertension    • Intracranial aneurysm 04/25/2023   • Lung nodule 2023    x rays   • MI (myocardial infarction) ([facility] [address] ) 1995   • Myocardial infarction ([facility] [address] ) 1995   • Pneumonia    • Pneumothorax 02/20/2023    collapsed lung   • Prostate cancer ([facility] [address] )    • RSV (respiratory syncytial virus infection) 10/2022   • S/P carotid endarterectomy    • Shortness of breath     O2 2 l/nc PRN   • Sleep apnea    • Sleep apnea, obstructive    • Stented coronary artery        Past Surgical History:   Procedure Laterality Date   • APPENDECTOMY     • CARDIAC CATHETERIZATION  03/18/2021    left main with no significant disease, proximal LAD with 10% stenosis at the site of prior stent, left circumflex artery with minimal luminal irregularities mid RCA with 50% stenosis at site of prior stent and PL segment with distal disease supplied by collaterals from the distal circumflex with no significant CAD requiring revascularization at that time     • CATARACT EXTRACTION, BILATERAL Bilateral    • COLONOSCOPY     • CORONARY ANGIOPLASTY WITH STENT PLACEMENT  1999    RCA   • CORONARY ANGIOPLASTY WITH STENT PLACEMENT  2001    RCA   • CORONARY ANGIOPLASTY WITH STENT PLACEMENT  2003    LAD   • EYE SURGERY     • KY BX PROSTATE STRTCTC SATURATION SAMPLING IMG GID N/A 09/13/2022    Procedure: TRANSPERINEAL MRI FUSION  BIOPSY PROSTATE;  Surgeon: Jorge Monsalve MD;  Location: [facility];  Service: Urology   • KY NEUROPLASTY &/TRANSPOS MEDIAN NRV Tulio David Left 07/22/2022    Procedure: RELEASE CARPAL TUNNEL;  Surgeon: Lashanda Butt MD;  Location: [facility];  Service: Orthopedics   • KY NEUROPLASTY &/TRANSPOSITION ULNAR NERVE ELBOW Left 07/22/2022    Procedure: RELEASE CUBITAL TUNNEL;  Surgeon: Lashanda Butt MD;  Location: [facility];  Service: Orthopedics   • KY NEUROPLASTY &/TRANSPOSITION ULNAR NERVE WRIST Left 07/22/2022    Procedure: Brian Li RELEASE;  Surgeon: Karyn Franklin MD;  Location: [facility];  Service: Orthopedics   • SKIN CANCER EXCISION  2012    chin-per pt, basal cell  also right ankle   • TONSILLECTOMY  no       Family History   Problem Relation Age of Onset   • Heart attack Mother    • Dementia Mother    • Heart failure Mother             • Heart disease Mother         heart attacks (2)   • No Known Problems Father      I have reviewed and agree with the history as documented  E-Cigarette/Vaping   • E-Cigarette Use Never User      E-Cigarette/Vaping Substances   • Nicotine No    • THC No    • CBD No    • Flavoring No    • Other No    • Unknown No      Social History     Tobacco Use   • Smoking status: Former     Packs/day: 2 00     Years: 35 00     Total pack years: 70 00     Types: Cigarettes     Start date: 1960     Quit date: 1995     Years since quittin 9   • Smokeless tobacco: Never   • Tobacco comments:     stopped smoking the day i had a heart attack   Vaping Use   • Vaping Use: Never used   Substance Use Topics   • Alcohol use: Never   • Drug use: Never       Review of Systems   Constitutional: Negative for chills and fever  HENT: Negative for ear pain and sore throat  Eyes: Negative for pain and visual disturbance  Respiratory: Negative for cough and shortness of breath  Cardiovascular: Negative for chest pain and palpitations  Gastrointestinal: Negative for abdominal pain and vomiting  Genitourinary: Negative for dysuria and hematuria  Musculoskeletal: Negative for arthralgias and back pain  Skin: Negative for color change and rash  Neurological: Positive for tremors and weakness  Negative for seizures and syncope  All other systems reviewed and are negative  Physical Exam  Physical Exam  Vitals and nursing note reviewed  Constitutional:       General: He is not in acute distress  Appearance: He is well-developed  He is not diaphoretic     HENT:      Head: Normocephalic and atraumatic  Right Ear: External ear normal       Left Ear: External ear normal       Nose: Nose normal    Eyes:      Conjunctiva/sclera: Conjunctivae normal       Pupils: Pupils are equal, round, and reactive to light  Cardiovascular:      Rate and Rhythm: Normal rate and regular rhythm  Pulmonary:      Effort: Pulmonary effort is normal  No respiratory distress  Breath sounds: Normal breath sounds  No wheezing  Abdominal:      General: There is no distension  Tenderness: There is no abdominal tenderness  Musculoskeletal:         General: Normal range of motion  Cervical back: Normal range of motion and neck supple  Lymphadenopathy:      Cervical: No cervical adenopathy  Skin:     General: Skin is warm and dry  Capillary Refill: Capillary refill takes less than 2 seconds  Neurological:      General: No focal deficit present  Mental Status: He is alert and oriented to person, place, and time     Psychiatric:         Behavior: Behavior normal          Vital Signs  ED Triage Vitals   Temperature Pulse Respirations Blood Pressure SpO2   05/28/23 1133 05/28/23 1133 05/28/23 1133 05/28/23 1133 05/28/23 1135   97 6 °F (36 4 °C) 57 20 127/76 (!) 85 %      Temp Source Heart Rate Source Patient Position - Orthostatic VS BP Location FiO2 (%)   05/28/23 1133 05/28/23 1133 05/28/23 1133 05/28/23 1133 --   Temporal Monitor Sitting Left arm       Pain Score       05/28/23 1133       No Pain           Vitals:    05/28/23 1133   BP: 127/76   Pulse: 57   Patient Position - Orthostatic VS: Sitting         Visual Acuity      ED Medications  Medications   potassium chloride 20 mEq IVPB (premix) (20 mEq Intravenous New Bag 5/28/23 1424)   sodium chloride 0 9 % bolus 1,000 mL (0 mL Intravenous Stopped 5/28/23 1424)   potassium chloride (K-DUR,KLOR-CON) CR tablet 40 mEq (40 mEq Oral Given 5/28/23 1351)       Diagnostic Studies  Results Reviewed     Procedure Component Value Units Date/Time Urine Microscopic [[id number]]  (Normal) Collected: 05/28/23 1222    Lab Status: Final result Specimen: Urine, Clean Catch Updated: 05/28/23 1308     RBC, UA 0-1 /hpf      WBC, UA None Seen /hpf      Epithelial Cells Occasional /hpf      Bacteria, UA None Seen /hpf     UA w Reflex to Microscopic w Reflex to Culture [[id number]]  (Abnormal) Collected: 05/28/23 1222    Lab Status: Final result Specimen: Urine, Clean Catch Updated: 05/28/23 1257     Color, UA Yellow     Clarity, UA Clear     Specific Gravity, UA 1 010     pH, UA 6 0     Leukocytes, UA Negative     Nitrite, UA Negative     Protein, UA Negative mg/dl      Glucose, UA Negative mg/dl      Ketones, UA Negative mg/dl      Urobilinogen, UA 0 2 E U /dl      Bilirubin, UA Negative     Occult Blood, UA 1+    Fingerstick Glucose (POCT) [[id number]]  (Normal) Collected: 05/28/23 1229    Lab Status: Final result Updated: 05/28/23 1230     POC Glucose 112 mg/dl     B-Type Natriuretic Peptide(BNP) [[id number]]  (Abnormal) Collected: 05/28/23 1151    Lab Status: Final result Specimen: Blood from Arm, Left Updated: 05/28/23 1227      pg/mL     Basic metabolic panel [[id number]]  (Abnormal) Collected: 05/28/23 1151    Lab Status: Final result Specimen: Blood from Arm, Left Updated: 05/28/23 1225     Sodium 140 mmol/L      Potassium 2 4 mmol/L      Chloride 95 mmol/L      CO2 35 mmol/L      ANION GAP 10 mmol/L      BUN 14 mg/dL      Creatinine 1 56 mg/dL      Glucose 134 mg/dL      Calcium 8 9 mg/dL      eGFR 41 ml/min/1 73sq m     Narrative:      [place] guidelines for Chronic Kidney Disease (CKD):   •  Stage 1 with normal or high GFR (GFR > 90 mL/min/1 73 square meters)  •  Stage 2 Mild CKD (GFR = 60-89 mL/min/1 73 square meters)  •  Stage 3A Moderate CKD (GFR = 45-59 mL/min/1 73 square meters)  •  Stage 3B Moderate CKD (GFR = 30-44 mL/min/1 73 square meters)  •  Stage 4 Severe CKD (GFR = 15-29 mL/min/1 73 square meters)  •  Stage 5 End Stage CKD (GFR <15 mL/min/1 73 square meters)  Note: GFR calculation is accurate only with a steady state creatinine    Magnesium [[id number]]  (Normal) Collected: 05/28/23 1151    Lab Status: Final result Specimen: Blood from Arm, Left Updated: 05/28/23 1218     Magnesium 2 3 mg/dL     CBC and differential [[id number]]  (Abnormal) Collected: 05/28/23 1151    Lab Status: Final result Specimen: Blood from Arm, Left Updated: 05/28/23 1200     WBC 7 68 Thousand/uL      RBC 4 45 Million/uL      Hemoglobin 13 8 g/dL      Hematocrit 43 6 %      MCV 98 fL      MCH 31 0 pg      MCHC 31 7 g/dL      RDW 15 2 %      MPV 10 0 fL      Platelets 952 Thousands/uL      nRBC 0 /100 WBCs      Neutrophils Relative 70 %      Immat GRANS % 0 %      Lymphocytes Relative 13 %      Monocytes Relative 12 %      Eosinophils Relative 4 %      Basophils Relative 1 %      Neutrophils Absolute 5 36 Thousands/µL      Immature Grans Absolute 0 03 Thousand/uL      Lymphocytes Absolute 1 02 Thousands/µL      Monocytes Absolute 0 94 Thousand/µL      Eosinophils Absolute 0 29 Thousand/µL      Basophils Absolute 0 04 Thousands/µL                  CT head wo contrast   Final Result by Dl Schulz MD (05/28 1216)      No evidence of acute intracranial process or significant interval change  Chronic microangiopathy                    Workstation performed: [id number]         XR chest 1 view portable    (Results Pending)              Procedures  CriticalCare Time    Date/Time: 5/28/2023 2:25 PM    Performed by: Dominick Gamez MD  Authorized by: Dominick Gamez MD    Critical care provider statement:     Critical care time (minutes):  45    Critical care start time:  5/28/2023 11:45 AM    Critical care end time:  5/28/2023 2:25 PM    Critical care time was exclusive of:  Separately billable procedures and treating other patients and teaching time    Critical care was necessary to treat or prevent imminent or life-threatening deterioration of the following conditions:  Renal failure, dehydration and metabolic crisis    Critical care was time spent personally by me on the following activities:  Blood draw for specimens, obtaining history from patient or surrogate, development of treatment plan with patient or surrogate, discussions with consultants, discussions with primary provider, evaluation of patient's response to treatment, examination of patient, review of old charts, re-evaluation of patient's condition, ordering and review of radiographic studies, ordering and review of laboratory studies, ordering and performing treatments and interventions and interpretation of cardiac output measurements    I assumed direction of critical care for this patient from another provider in my specialty: no               ED Course  ED Course as of 05/28/23 1427   Sun May 28, 2023   1352 Potassium(!!): 2 4  Replaced    1352 Creatinine(!): 1 56                                             Medical Decision Making  79-year-old male with history of prostate cancer presenting with complaint of abnormal labs  History taken from patient and daughter and wife by bedside  Patient been having generalized weakness and tremor ongoing found to have low potassium was sent to the ED  We will obtain labs CT head given patient ongoing tremors  Differential diagnoses include: Electrolyte abnormality/stroke/Parkinson disease/arrhythmia  Labs CT reviewed Labs showed potassium 2 4 patient started on p o  and IV potassium  Given patient potassium there is no EKG changes  Disposition: Admitted under the service of [facility] place on telemetry  Case discussed with Marysol Ahn from [facility] team accepted the admission  Discussed with patient's and patient's family agree with the plan  Amount and/or Complexity of Data Reviewed  Labs: ordered  Decision-making details documented in ED Course  Radiology: ordered  Risk  Prescription drug management    Decision regarding hospitalization  Disposition  Final diagnoses:   Acute kidney injury ([facility] [address] )   Hypokalemia   Weakness     Time reflects when diagnosis was documented in both MDM as applicable and the Disposition within this note     Time User Action Codes Description Comment    5/28/2023  1:53 PM Orma Ely Add [N17 9] Acute kidney injury ([facility] [address] )     5/28/2023  1:53 PM Francisco Paul [E87 6] Hypokalemia     5/28/2023  1:53 PM Orma Ely Add [R53 1] Weakness       ED Disposition     ED Disposition   Admit    Condition   Stable    Date/Time   Sun May 28, 2023  1:53 PM    Comment   Case was discussed with Rachelle Wang  and the patient's admission status was agreed to be Admission Status: inpatient status to the service of Dr Irish Gracia   Follow-up Information    None         Patient's Medications   Discharge Prescriptions    No medications on file       No discharge procedures on file      PDMP Review       Value Time User    PDMP Reviewed  Yes 5/26/2023  3:40 PM Yong Stubbs DO          ED Provider  Electronically Signed by           Walter Castellon MD  05/28/23 0662

## 2023-05-28 NOTE — H&P
S[facility] 44  H&P  Name: Barak Kasper 77 y o  male I MRN: [id number]  Unit/Bed#: -01 I Date of Admission: 5/28/2023   Date of Service: 5/28/2023 I Hospital Day: 0      Assessment/Plan   * Hypokalemia  Assessment & Plan  · Potassium of 2 9 on outpatient labs- noted to be 2 4 today  · Likely due to forsemide use  · Patient received 40 mEq IV potassium and 40 mEq of po potassium in the ED  · Telemetry monitoring  · No EKG changes  · Will repeat BMP at 1700 and in am    Acute renal failure superimposed on stage 3a chronic kidney disease [facility])  Assessment & Plan  Lab Results   Component Value Date    CREATININE 1 56 (H) 05/28/2023    CREATININE 1 61 (H) 05/26/2023    CREATININE 0 85 04/28/2023    EGFR 41 05/28/2023    EGFR 40 05/26/2023    EGFR 83 04/28/2023     · Creatinine of 1 56 on admission- baseline appears to be around 0 8  · Likely due to furosemide use- will hold for now  · Patient received 1 L NSS in the ED  · Will hold off on additional IV fluids for now  · Avoid nephrotoxins and hypotension  · Labs in am    Sensorineural hearing loss, bilateral  Assessment & Plan  · Patient has had hearing loss since last admission from 4/3/23 to 4/17/23  · Patient is following with ENT  · MRI scheduled for 6/1/23    Chronic diastolic (congestive) heart failure (HCC)  Assessment & Plan  Wt Readings from Last 3 Encounters:   05/28/23 82 1 kg (181 lb)   05/26/23 82 3 kg (181 lb 6 4 oz)   05/19/23 83 3 kg (183 lb 9 6 oz)     · Patient not in acute exacerbation  · Will hold forusemide in the setting of CHEYANNE and hypokalemia  · Daily weights, I&O's  · Monitor respiratory function closely by holding diuretics         Oxygen dependent  Assessment & Plan  · Patient currently on his baseline O2 of 3 L via NC  · Patient with history of COPD- he's had COVID, RSV, and pneumonia in the last few months  He states his breathing has improved since being discharged last month            VTE Pharmacologic Prophylaxis: VTE Score: 7 High Risk (Score >/= 5) - Pharmacological DVT Prophylaxis Ordered: heparin  Sequential Compression Devices Ordered  Code Status: Level 3 - DNAR and DNI   Discussion with family: Updated  (wife and daughter) at bedside  Anticipated Length of Stay: Patient will be admitted on an inpatient basis with an anticipated length of stay of greater than 2 midnights secondary to treatment of hypokalemia and CHEYANNE  Total Time Spent on Date of Encounter in care of patient: 65 minutes This time was spent on one or more of the following: performing physical exam; counseling and coordination of care; obtaining or reviewing history; documenting in the medical record; reviewing/ordering tests, medications or procedures; communicating with other healthcare professionals and discussing with patient's family/caregivers  Chief Complaint: abnormal labs    History of Present Illness:  Katelyn Trevino is a 77 y o  male with a PMH of COPD, CHF, CKD, oxygen depending, hearing loss who presents to the ED at the request of his PCP due to abnormal outpatient labs  He was noted to have potassium of 2 9 and creatinine of 1 61  In the ED he was noted to have a potassium of 2 4 and creatinine of 1 56  The patient's family reports tremors for a few months  He continues to have hearing loss since his admission last month  He's been following with ENT and is scheduled to have a MRI on 6/1/23  He denies any chest pain, shortness of breath, abdominal pain, nausea/vomiting, or diarrhea  He denies fevers/chills  Review of Systems:  Review of Systems   Constitutional: Positive for fatigue  Neurological: Positive for tremors  All other systems reviewed and are negative        Past Medical and Surgical History:   Past Medical History:   Diagnosis Date   • Abnormal ECG 2021 OR 2022   • Alcoholism ([facility] [address] ) 1984    sober 38 years   • Arthritis 2008    beginning   • Bilateral carotid artery stenosis    • Callus • Cancer ([facility] )     skin   • Chronic diastolic heart failure (HCC)    • Chronic ischemic heart disease    • Chronic kidney disease     stage 3   • Colon polyp    • COPD (chronic obstructive pulmonary disease) (HCC)    • Coronary artery disease     hx stents, MI, PCI   • COVID 11/2021   • CPAP (continuous positive airway pressure) dependence    • Disease of thyroid gland 2017    nodules   • Em[facility] lung ([facility] ) 1995    started   • Hearing loss    • History of transfusion 1995   • Hyperlipidemia    • Hypertension    • Intracranial aneurysm 04/25/2023   • Lung nodule 2023    x rays   • MI (myocardial infarction) ([facility] ) 1995   • Myocardial infarction ([place] [address] ) 1995   • Pneumonia    • Pneumothorax 02/20/2023    collapsed lung   • Prostate cancer ([place] [address] )    • RSV (respiratory syncytial virus infection) 10/2022   • S/P carotid endarterectomy    • Shortness of breath     O2 2 l/nc PRN   • Sleep apnea    • Sleep apnea, obstructive    • Stented coronary artery        Past Surgical History:   Procedure Laterality Date   • APPENDECTOMY     • CARDIAC CATHETERIZATION  03/18/2021    left main with no significant disease, proximal LAD with 10% stenosis at the site of prior stent, left circumflex artery with minimal luminal irregularities mid RCA with 50% stenosis at site of prior stent and PL segment with distal disease supplied by collaterals from the distal circumflex with no significant CAD requiring revascularization at that time     • CATARACT EXTRACTION, BILATERAL Bilateral    • COLONOSCOPY     • CORONARY ANGIOPLASTY WITH STENT PLACEMENT  1999    RCA   • CORONARY ANGIOPLASTY WITH STENT PLACEMENT  2001    RCA   • CORONARY ANGIOPLASTY WITH STENT PLACEMENT  2003    LAD   • EYE SURGERY     • NC BX PROSTATE STRTCTC SATURATION SAMPLING IMG GID N/A 09/13/2022    Procedure: TRANSPERINEAL MRI FUSION  BIOPSY PROSTATE;  Surgeon: Susie Goldberg MD;  Location: [facility];  Service: Urology   • NC NEUROPLASTY &/TRANSPOS MEDIAN NRV CARPAL Elton Burkitt Left 07/22/2022    Procedure: RELEASE CARPAL TUNNEL;  Surgeon: Erickson Linder MD;  Location: [facility];  Service: Orthopedics   • SC NEUROPLASTY &/TRANSPOSITION ULNAR NERVE ELBOW Left 07/22/2022    Procedure: RELEASE CUBITAL TUNNEL;  Surgeon: Erickson Linder MD;  Location: [facility];  Service: Orthopedics   • SC NEUROPLASTY &/TRANSPOSITION ULNAR NERVE WRIST Left 07/22/2022    Procedure: Kathy Coventry RELEASE;  Surgeon: Erickson Linder MD;  Location: [facility];  Service: Orthopedics   • SKIN CANCER EXCISION  2012    chin-per pt, basal cell  also right ankle   • TONSILLECTOMY  no       Meds/Allergies:  Prior to Admission medications    Medication Sig Start Date End Date Taking? Authorizing Provider   azithromycin (ZITHROMAX) 250 mg tablet Take 500 mg by mouth every 24 hours   Yes Historical Provider, MD   ALPRAZolam Maryanne Drone) 0 25 mg tablet Take 1 tablet (0 25 mg total) by mouth 2 (two) times a day as needed for anxiety 5/26/23   Alia Fragmin, DO   aspirin 81 mg chewable tablet 81 mg daily at bedtime    Historical Provider, MD   budesonide (PULMICORT) 0 5 mg/2 mL nebulizer solution Take 2 mL (0 5 mg total) by nebulization every 12 (twelve) hours Rinse mouth after use    Patient not taking: Reported on 5/11/2023 3/31/23 5/3/23  Laurazell Nissen Ward, DO   dextromethorphan-guaiFENesin [facility] DM)  mg/5 mL syrup Take 5 mL by mouth 3 (three) times a day as needed for cough 2/13/23   Manny Steele PA-C   famotidine (PEPCID) 20 mg tablet Take 1 tablet (20 mg total) by mouth daily at bedtime 2/14/23   Alia Fragmin, DO   formoterol (PERFOROMIST) 20 MCG/2ML nebulizer solution Take 2 mL (20 mcg total) by nebulization every 12 (twelve) hours 5/15/23 6/14/23  Liz Lopez, DO   furosemide (LASIX) 40 mg tablet Take 1 tablet (40 mg total) by mouth daily 1/20/22   Alia Fragmin, DO   gabapentin (NEURONTIN) 300 mg capsule Take 1 capsule (300 mg total) by mouth daily at bedtime 5/3/23   Alia Fragmin, DO   ipratropium-albuterol (DUO-NEB) 0 5-2 5 mg/3 mL nebulizer solution Take 3 mL by nebulization 4 (four) times a day 5/3/23   Mars Zavala DO   metoprolol succinate (TOPROL-XL) 25 mg 24 hr tablet Take 0 5 tablets (12 5 mg total) by mouth daily 5/25/23   [place] Contras, DO   oxygen gas Inhale 2 L/min continuous 2LPM at rest and 3-4 LPM with activity per D Cedric FANG notes    Historical Provider, MD   potassium chloride (Klor-Con) 10 mEq tablet Take 2 tablets (20 mEq total) by mouth daily 1/20/22   Mars Zavala DO   predniSONE 20 mg tablet 3 tabs by mouth days 1-5, 2 tabs by mouth days 6-10, 1 tab by mouth days 11-15  Patient not taking: Reported on 5/12/2023 4/27/23   Madiha Madden PA-C   rosuvastatin (CRESTOR) 40 MG tablet TAKE 1 TABLET DAILY  Patient taking differently: Take 40 mg by mouth daily at bedtime 11/21/22   Mars Zavala DO   acetaminophen (TYLENOL) 325 mg tablet Take 650 mg by mouth every 6 (six) hours as needed  Patient not taking: Reported on 5/19/2023 5/28/23  Historical Provider, MD   albuterol (ACCUNEB) 1 25 MG/3ML nebulizer solution Take 1 25 mg by nebulization every 6 (six) hours as needed for wheezing  Patient not taking: Reported on 5/26/2023 5/28/23  Historical Provider, MD   Ascorbic Acid (vitamin C) 1000 MG tablet Take 1,000 mg by mouth daily  Patient not taking: No sig reported  10/15/22  Historical Provider, MD   fluticasone (FLONASE) 50 mcg/act nasal spray 1 spray into each nostril daily  Patient not taking: Reported on 5/19/2023 5/28/23  Historical Provider, MD   methocarbamol (ROBAXIN) 500 mg tablet Take 1 tablet (500 mg total) by mouth 3 (three) times a day as needed for muscle spasms  Patient not taking: No sig reported 4/27/22 10/15/22  Mars Zavala DO     I have reviewed home medications using recent Epic encounter  Allergies:    Allergies   Allergen Reactions   • Lisinopril Swelling and Cough   • Tetanus Antitoxin Anaphylaxis   • Tetanus Toxoid "Anaphylaxis and Swelling       Social History:  Marital Status: /Civil Union   Occupation: retired   Patient Pre-hospital Living Situation: Home  Patient Pre-hospital Level of Mobility: walks  Patient Pre-hospital Diet Restrictions: nectar thick liquids  Substance Use History:   Social History     Substance and Sexual Activity   Alcohol Use Never     Social History     Tobacco Use   Smoking Status Former   • Packs/day: 2 00   • Years: 35 00   • Total pack years: 70 00   • Types: Cigarettes   • Start date: 1960   • Quit date: 1995   • Years since quittin 9   Smokeless Tobacco Never   Tobacco Comments    stopped smoking the day i had a heart attack     Social History     Substance and Sexual Activity   Drug Use Never       Family History:  Family History   Problem Relation Age of Onset   • Heart attack Mother    • Dementia Mother    • Heart failure Mother             • Heart disease Mother         heart attacks (2)   • No Known Problems Father        Physical Exam:     Vitals:   Blood Pressure: 127/76 (23 1133)  Pulse: 57 (23 1133)  Temperature: 97 6 °F (36 4 °C) (23 1133)  Temp Source: Temporal (23 1133)  Respirations: 20 (23 1133)  Height: 5' 8\" (172 7 cm) (23 1133)  Weight - Scale: 82 1 kg (181 lb) (23 1133)  SpO2: (!) 85 % (family states this is a normal level for him) (23 1135)    Physical Exam  Vitals and nursing note reviewed  Constitutional:       General: He is awake  Appearance: He is morbidly obese  Interventions: Nasal cannula in place  HENT:      Head: Normocephalic and atraumatic  Ears:      Comments: Patient [place]  Cardiovascular:      Rate and Rhythm: Normal rate and regular rhythm  Heart sounds: Normal heart sounds  Pulmonary:      Effort: Pulmonary effort is normal       Breath sounds: Normal breath sounds  Abdominal:      Palpations: Abdomen is soft  Tenderness:  There is no abdominal " tenderness  Skin:     General: Skin is warm and dry  Findings: Abrasion present  Neurological:      General: No focal deficit present  Mental Status: He is alert and oriented to person, place, and time  Psychiatric:         Attention and Perception: Attention normal          Mood and Affect: Mood normal          Speech: Speech normal          Behavior: Behavior is cooperative  Additional Data:     Lab Results:  Results from last 7 days   Lab Units 05/28/23  1151   EOS PCT % 4   HEMATOCRIT % 43 6   HEMOGLOBIN g/dL 13 8   LYMPHS PCT % 13*   MONOS PCT % 12   NEUTROS PCT % 70   PLATELETS Thousands/uL 177   WBC Thousand/uL 7 68     Results from last 7 days   Lab Units 05/28/23  1151 05/26/23  1555   ANION GAP mmol/L 10 0*   ALBUMIN g/dL  --  3 0*   ALK PHOS U/L  --  80   ALT U/L  --  30   AST U/L  --  30   BUN mg/dL 14 16   CALCIUM mg/dL 8 9 9 0   CHLORIDE mmol/L 95* 102   CO2 mmol/L 35* 35*   CREATININE mg/dL 1 56* 1 61*   GLUCOSE RANDOM mg/dL 134  --    POTASSIUM mmol/L 2 4* 2 9*   SODIUM mmol/L 140 137   TOTAL BILIRUBIN mg/dL  --  0 66         Results from last 7 days   Lab Units 05/28/23  1229   POC GLUCOSE mg/dl 112               Lines/Drains:  Invasive Devices     Peripheral Intravenous Line  Duration           Peripheral IV 05/28/23 Left Antecubital <1 day                    Imaging: Reviewed radiology reports from this admission including: CT head  CT head wo contrast   Final Result by Abigail Summers MD (05/28 1216)      No evidence of acute intracranial process or significant interval change  Chronic microangiopathy  Workstation performed: [id number]         XR chest 1 view portable    (Results Pending)         ** Please Note: This note has been constructed using a voice recognition system   **

## 2023-05-28 NOTE — ASSESSMENT & PLAN NOTE
· Patient currently on his baseline O2 of 3 L via NC  · Patient with history of COPD- he's had COVID, RSV, and pneumonia in the last few months  He states his breathing has improved since being discharged last month

## 2023-05-28 NOTE — TELEPHONE ENCOUNTER
Received recent blood work results- potassium 2 9, creatinine 1 6 with GFR 40, baseline GFR high 80s  Contacted daughter Girish García on emergency consent  She said past couple days he gets very weak with any exertion, muscle cramping and tremors  Almost fell going into [employer] after our appointment on Friday  I advised ED eval, she is agreeable to take him  All questions answered

## 2023-05-28 NOTE — ASSESSMENT & PLAN NOTE
Wt Readings from Last 3 Encounters:   05/28/23 82 1 kg (181 lb)   05/26/23 82 3 kg (181 lb 6 4 oz)   05/19/23 83 3 kg (183 lb 9 6 oz)     · Patient not in acute exacerbation  · Will hold forusemide in the setting of CHEYANNE and hypokalemia  · Daily weights, I&O's  · Monitor respiratory function closely by holding diuretics

## 2023-05-28 NOTE — RESPIRATORY THERAPY NOTE
RT Protocol Note  Damion Mar 77 y o  male MRN: [id number]  Unit/Bed#: -01 Encounter: [id number]    Assessment    Principal Problem:    Hypokalemia  Active Problems:    Oxygen dependent    Chronic diastolic (congestive) heart failure (HCC)    Acute renal failure superimposed on stage 3a chronic kidney disease ([facility] )    Sensorineural hearing loss, bilateral      Home Pulmonary Medications:  Performamist BID  Duoneb QID  Albuterol prn  Home Devices/Therapy: Home O2    Past Medical History:   Diagnosis Date    Abnormal ECG 2021 OR 2022    Alcoholism ([place] [address] ) 1984    sober 38 years    Arthritis 2008    beginning    Bilateral carotid artery stenosis     Callus     Cancer (HCC)     skin    Chronic diastolic heart failure (HCC)     Chronic ischemic heart disease     Chronic kidney disease     stage 3    Colon polyp     COPD (chronic obstructive pulmonary disease) ([facility])     Coronary artery disease     hx stents, MI, PCI    COVID 11/2021    CPAP (continuous positive airway pressure) dependence     Disease of thyroid gland 2017    nodules    Emphysema of lung ([place] [address] ) 1995    started    Hearing loss     History of transfusion 1995    Hyperlipidemia     Hypertension     Intracranial aneurysm 04/25/2023    Lung nodule 2023    x rays    MI (myocardial infarction) ([place] [address] ) 1995    Myocardial infarction ([place] [address] ) 1995    Pneumonia     Pneumothorax 02/20/2023    collapsed lung    Prostate cancer ([place] [address] )     RSV (respiratory syncytial virus infection) 10/2022    S/P carotid endarterectomy     Shortness of breath     O2 2 l/nc PRN    Sleep apnea     Sleep apnea, obstructive     Stented coronary artery      Social History     Socioeconomic History    Marital status: /Civil Union     Spouse name: None    Number of children: None    Years of education: None    Highest education level: None   Occupational History    None   Tobacco Use    Smoking status: Former     Packs/day: 2 00     Years: 35 00     Total pack years: 70 00     Types: "Cigarettes     Start date: 1960     Quit date: 1995     Years since quittin 9    Smokeless tobacco: Never    Tobacco comments:     stopped smoking the day i had a heart attack   Vaping Use    Vaping Use: Never used   Substance and Sexual Activity    Alcohol use: Never    Drug use: Never    Sexual activity: Not Currently     Partners: Female     Birth control/protection: None   Other Topics Concern    None   Social History Narrative    None     Social Determinants of Health     Financial Resource Strain: Not on file   Food Insecurity: No Food Insecurity (2023)    Hunger Vital Sign     Worried About Running Out of Food in the Last Year: Never true     Ran Out of Food in the Last Year: Never true   Transportation Needs: No Transportation Needs (2023)    PRAPARE - Transportation     Lack of Transportation (Medical): No     Lack of Transportation (Non-Medical): No   Physical Activity: Not on file   Stress: Not on file   Social Connections: Not on file   Intimate Partner Violence: Not on file   Housing Stability: Low Risk  (2023)    Housing Stability Vital Sign     Unable to Pay for Housing in the Last Year: No     Number of Places Lived in the Last Year: 1     Unstable Housing in the Last Year: No       Subjective         Objective    Physical Exam:   Assessment Type: Assess only  General Appearance: Awake  Respiratory Pattern: Normal  Chest Assessment: Chest expansion symmetrical  Bilateral Breath Sounds: Clear    Vitals:  Blood pressure 127/76, pulse 57, temperature 97 6 °F (36 4 °C), temperature source Temporal, resp  rate 20, height 5' 8\" (1 727 m), weight 82 1 kg (181 lb), SpO2 (!) 85 %  Imaging and other studies: I have personally reviewed pertinent reports  Plan    Respiratory Plan: Home Bronchodilator Patient pathway        Resp Comments: (P) Pt admitted for hyperkalemia  Pt with hx of COPD, takes perforamist BID and duoneb QID at home  Pt also on chronic O2 at 2-3l   " Will change duoneb to xopenex/atrovent per protocal  No additional therapy needed

## 2023-05-28 NOTE — PLAN OF CARE
Problem: MOBILITY - ADULT  Goal: Maintain or return to baseline ADL function  Description: INTERVENTIONS:  -  Assess patient's ability to carry out ADLs; assess patient's baseline for ADL function and identify physical deficits which impact ability to perform ADLs (bathing, care of mouth/teeth, toileting, grooming, dressing, etc )  - Assess/evaluate cause of self-care deficits   - Assess range of motion  - Assess patient's mobility; develop plan if impaired  - Assess patient's need for assistive devices and provide as appropriate  - Encourage maximum independence but intervene and supervise when necessary  - Involve family in performance of ADLs  - Assess for home care needs following discharge   - Consider OT consult to assist with ADL evaluation and planning for discharge  - Provide patient education as appropriate  Outcome: Progressing  Goal: Maintains/Returns to pre admission functional level  Description: INTERVENTIONS:  - Perform BMAT or MOVE assessment daily    - Set and communicate daily mobility goal to care team and patient/family/caregiver  - Collaborate with rehabilitation services on mobility goals if consulted  - Perform Range of Motion 3 times a day  - Reposition patient every 2 hours    - Dangle patient 3 times a day  - Stand patient 3 times a day  - Ambulate patient 3 times a day  - Out of bed to chair 3 times a day   - Out of bed for meals 3 times a day  - Out of bed for toileting  - Record patient progress and toleration of activity level   Outcome: Progressing     Problem: PAIN - ADULT  Goal: Verbalizes/displays adequate comfort level or baseline comfort level  Description: Interventions:  - Encourage patient to monitor pain and request assistance  - Assess pain using appropriate pain scale  - Administer analgesics based on type and severity of pain and evaluate response  - Implement non-pharmacological measures as appropriate and evaluate response  - Consider cultural and social influences on pain and pain management  - Notify physician/advanced practitioner if interventions unsuccessful or patient reports new pain  Outcome: Progressing     Problem: INFECTION - ADULT  Goal: Absence or prevention of progression during hospitalization  Description: INTERVENTIONS:  - Assess and monitor for signs and symptoms of infection  - Monitor lab/diagnostic results  - Monitor all insertion sites, i e  indwelling lines, tubes, and drains  - Monitor endotracheal if appropriate and nasal secretions for changes in amount and color  - Orange appropriate cooling/warming therapies per order  - Administer medications as ordered  - Instruct and encourage patient and family to use good hand hygiene technique  - Identify and instruct in appropriate isolation precautions for identified infection/condition  Outcome: Progressing  Goal: Absence of fever/infection during neutropenic period  Description: INTERVENTIONS:  - Monitor WBC    Outcome: Progressing     Problem: SAFETY ADULT  Goal: Maintain or return to baseline ADL function  Description: INTERVENTIONS:  -  Assess patient's ability to carry out ADLs; assess patient's baseline for ADL function and identify physical deficits which impact ability to perform ADLs (bathing, care of mouth/teeth, toileting, grooming, dressing, etc )  - Assess/evaluate cause of self-care deficits   - Assess range of motion  - Assess patient's mobility; develop plan if impaired  - Assess patient's need for assistive devices and provide as appropriate  - Encourage maximum independence but intervene and supervise when necessary  - Involve family in performance of ADLs  - Assess for home care needs following discharge   - Consider OT consult to assist with ADL evaluation and planning for discharge  - Provide patient education as appropriate  Outcome: Progressing  Goal: Maintains/Returns to pre admission functional level  Description: INTERVENTIONS:  - Perform BMAT or MOVE assessment daily    - Set and communicate daily mobility goal to care team and patient/family/caregiver  - Collaborate with rehabilitation services on mobility goals if consulted  - Perform Range of Motion 3 times a day  - Reposition patient every 2 hours    - Dangle patient 3 times a day  - Stand patient 3 times a day  - Ambulate patient 3 times a day  - Out of bed to chair 3 times a day   - Out of bed for meals 3 times a day  - Out of bed for toileting  - Record patient progress and toleration of activity level   Outcome: Progressing  Goal: Patient will remain free of falls  Description: INTERVENTIONS:  - Educate patient/family on patient safety including physical limitations  - Instruct patient to call for assistance with activity   - Consult OT/PT to assist with strengthening/mobility   - Keep Call bell within reach  - Keep bed low and locked with side rails adjusted as appropriate  - Keep care items and personal belongings within reach  - Initiate and maintain comfort rounds  - Make Fall Risk Sign visible to staff  - Offer Toileting every 2 Hours, in advance of need  - Initiate/Maintain bed/chair alarm  - Obtain necessary fall risk management equipment: non skid socks applied, bed in lowest and locked position, call bell within reach  - Apply yellow socks and bracelet for high fall risk patients  - Consider moving patient to room near nurses station  Outcome: Progressing     Problem: DISCHARGE PLANNING  Goal: Discharge to home or other facility with appropriate resources  Description: INTERVENTIONS:  - Identify barriers to discharge w/patient and caregiver  - Arrange for needed discharge resources and transportation as appropriate  - Identify discharge learning needs (meds, wound care, etc )  - Arrange for interpretive services to assist at discharge as needed  - Refer to Case Management Department for coordinating discharge planning if the patient needs post-hospital services based on physician/advanced practitioner order or complex needs related to functional status, cognitive ability, or social support system  Outcome: Progressing     Problem: Knowledge Deficit  Goal: Patient/family/caregiver demonstrates understanding of disease process, treatment plan, medications, and discharge instructions  Description: Complete learning assessment and assess knowledge base    Interventions:  - Provide teaching at level of understanding  - Provide teaching via preferred learning methods  Outcome: Progressing

## 2023-05-28 NOTE — ASSESSMENT & PLAN NOTE
· Potassium of 2 9 on outpatient labs- noted to be 2 4 today  · Likely due to forsemide use  · Patient received 40 mEq IV potassium and 40 mEq of po potassium in the ED  · Telemetry monitoring  · No EKG changes  · Will repeat BMP at 1700 and in am

## 2023-05-29 LAB
ALBUMIN SERPL BCP-MCNC: 3 G/DL (ref 3.5–5)
ALP SERPL-CCNC: 54 U/L (ref 34–104)
ALT SERPL W P-5'-P-CCNC: 15 U/L (ref 7–52)
ANION GAP SERPL CALCULATED.3IONS-SCNC: 4 MMOL/L (ref 4–13)
AST SERPL W P-5'-P-CCNC: 17 U/L (ref 13–39)
BILIRUB SERPL-MCNC: 0.62 MG/DL (ref 0.2–1)
BUN SERPL-MCNC: 13 MG/DL (ref 5–25)
CALCIUM ALBUM COR SERPL-MCNC: 8.6 MG/DL (ref 8.3–10.1)
CALCIUM SERPL-MCNC: 7.8 MG/DL (ref 8.4–10.2)
CHLORIDE SERPL-SCNC: 104 MMOL/L (ref 96–108)
CO2 SERPL-SCNC: 33 MMOL/L (ref 21–32)
CREAT SERPL-MCNC: 1.46 MG/DL (ref 0.6–1.3)
ERYTHROCYTE [DISTWIDTH] IN BLOOD BY AUTOMATED COUNT: 15.5 % (ref 11.6–15.1)
GFR SERPL CREATININE-BSD FRML MDRD: 45 ML/MIN/1.73SQ M
GLUCOSE SERPL-MCNC: 101 MG/DL (ref 65–140)
GLUCOSE SERPL-MCNC: 86 MG/DL (ref 65–140)
HCT VFR BLD AUTO: 37.2 % (ref 36.5–49.3)
HGB BLD-MCNC: 11.6 G/DL (ref 12–17)
MAGNESIUM SERPL-MCNC: 2.1 MG/DL (ref 1.9–2.7)
MCH RBC QN AUTO: 31.5 PG (ref 26.8–34.3)
MCHC RBC AUTO-ENTMCNC: 31.2 G/DL (ref 31.4–37.4)
MCV RBC AUTO: 101 FL (ref 82–98)
PLATELET # BLD AUTO: 152 THOUSANDS/UL (ref 149–390)
PMV BLD AUTO: 9.5 FL (ref 8.9–12.7)
POTASSIUM SERPL-SCNC: 3.1 MMOL/L (ref 3.5–5.3)
PROT SERPL-MCNC: 4.9 G/DL (ref 6.4–8.4)
RBC # BLD AUTO: 3.68 MILLION/UL (ref 3.88–5.62)
SODIUM SERPL-SCNC: 141 MMOL/L (ref 135–147)
WBC # BLD AUTO: 6.37 THOUSAND/UL (ref 4.31–10.16)

## 2023-05-29 RX ORDER — POTASSIUM CHLORIDE 14.9 MG/ML
20 INJECTION INTRAVENOUS ONCE
Status: COMPLETED | OUTPATIENT
Start: 2023-05-29 | End: 2023-05-29

## 2023-05-29 RX ORDER — POTASSIUM CHLORIDE 20 MEQ/1
40 TABLET, EXTENDED RELEASE ORAL ONCE
Status: COMPLETED | OUTPATIENT
Start: 2023-05-29 | End: 2023-05-29

## 2023-05-29 RX ADMIN — LEVALBUTEROL HYDROCHLORIDE 1.25 MG: 1.25 SOLUTION RESPIRATORY (INHALATION) at 07:20

## 2023-05-29 RX ADMIN — POTASSIUM CHLORIDE 20 MEQ: 14.9 INJECTION, SOLUTION INTRAVENOUS at 01:23

## 2023-05-29 RX ADMIN — HEPARIN SODIUM 5000 UNITS: 5000 INJECTION INTRAVENOUS; SUBCUTANEOUS at 13:59

## 2023-05-29 RX ADMIN — IPRATROPIUM BROMIDE 0.5 MG: 0.5 SOLUTION RESPIRATORY (INHALATION) at 13:21

## 2023-05-29 RX ADMIN — HEPARIN SODIUM 5000 UNITS: 5000 INJECTION INTRAVENOUS; SUBCUTANEOUS at 21:11

## 2023-05-29 RX ADMIN — FORMOTEROL FUMARATE DIHYDRATE 20 MCG: 20 SOLUTION RESPIRATORY (INHALATION) at 20:33

## 2023-05-29 RX ADMIN — HEPARIN SODIUM 5000 UNITS: 5000 INJECTION INTRAVENOUS; SUBCUTANEOUS at 05:26

## 2023-05-29 RX ADMIN — LEVALBUTEROL HYDROCHLORIDE 1.25 MG: 1.25 SOLUTION RESPIRATORY (INHALATION) at 20:11

## 2023-05-29 RX ADMIN — POTASSIUM CHLORIDE 20 MEQ: 750 TABLET, EXTENDED RELEASE ORAL at 08:29

## 2023-05-29 RX ADMIN — LEVALBUTEROL HYDROCHLORIDE 1.25 MG: 1.25 SOLUTION RESPIRATORY (INHALATION) at 13:21

## 2023-05-29 RX ADMIN — AZITHROMYCIN MONOHYDRATE 500 MG: 250 TABLET ORAL at 14:00

## 2023-05-29 RX ADMIN — IPRATROPIUM BROMIDE 0.5 MG: 0.5 SOLUTION RESPIRATORY (INHALATION) at 20:11

## 2023-05-29 RX ADMIN — ASPIRIN 81 MG 81 MG: 81 TABLET ORAL at 21:11

## 2023-05-29 RX ADMIN — FAMOTIDINE 20 MG: 20 TABLET, FILM COATED ORAL at 21:11

## 2023-05-29 RX ADMIN — FORMOTEROL FUMARATE DIHYDRATE 20 MCG: 20 SOLUTION RESPIRATORY (INHALATION) at 07:35

## 2023-05-29 RX ADMIN — ATORVASTATIN CALCIUM 80 MG: 80 TABLET, FILM COATED ORAL at 16:47

## 2023-05-29 RX ADMIN — POTASSIUM CHLORIDE 20 MEQ: 14.9 INJECTION, SOLUTION INTRAVENOUS at 12:46

## 2023-05-29 RX ADMIN — POTASSIUM CHLORIDE 40 MEQ: 1500 TABLET, EXTENDED RELEASE ORAL at 12:46

## 2023-05-29 RX ADMIN — POTASSIUM CHLORIDE 20 MEQ: 14.9 INJECTION, SOLUTION INTRAVENOUS at 00:20

## 2023-05-29 RX ADMIN — IPRATROPIUM BROMIDE 0.5 MG: 0.5 SOLUTION RESPIRATORY (INHALATION) at 07:20

## 2023-05-29 RX ADMIN — GABAPENTIN 300 MG: 300 CAPSULE ORAL at 21:11

## 2023-05-29 NOTE — PLAN OF CARE
Problem: METABOLIC, FLUID AND ELECTROLYTES - ADULT  Goal: Electrolytes maintained within normal limits  Description: INTERVENTIONS:  - Monitor labs and assess patient for signs and symptoms of electrolyte imbalances  - Administer electrolyte replacement as ordered  - Monitor response to electrolyte replacements, including repeat lab results as appropriate  - Instruct patient on fluid and nutrition as appropriate  Outcome: Progressing  Goal: Fluid balance maintained  Description: INTERVENTIONS:  - Monitor labs   - Monitor I/O and WT  - Instruct patient on fluid and nutrition as appropriate  - Assess for signs & symptoms of volume excess or deficit  Outcome: Progressing  Goal: Glucose maintained within target range  Description: INTERVENTIONS:  - Monitor Blood Glucose as ordered  - Assess for signs and symptoms of hyperglycemia and hypoglycemia  - Administer ordered medications to maintain glucose within target range  - Assess nutritional intake and initiate nutrition service referral as needed  Outcome: Progressing     Problem: MOBILITY - ADULT  Goal: Maintains/Returns to pre admission functional level  Description: INTERVENTIONS:  - Perform BMAT or MOVE assessment daily    - Set and communicate daily mobility goal to care team and patient/family/caregiver  - Collaborate with rehabilitation services on mobility goals if consulted  - Perform Range of Motion 3 times a day  - Reposition patient every 2 hours    - Dangle patient 3 times a day  - Stand patient 3 times a day  - Ambulate patient 3 times a day  - Out of bed to chair 3 times a day   - Out of bed for meals 3 times a day  - Out of bed for toileting  - Record patient progress and toleration of activity level   Outcome: Progressing     Problem: Knowledge Deficit  Goal: Patient/family/caregiver demonstrates understanding of disease process, treatment plan, medications, and discharge instructions  Description: Complete learning assessment and assess knowledge base   Interventions:  - Provide teaching at level of understanding  - Provide teaching via preferred learning methods  Outcome: Progressing

## 2023-05-29 NOTE — ASSESSMENT & PLAN NOTE
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

## 2023-05-29 NOTE — ASSESSMENT & PLAN NOTE
· Patient has had hearing loss since last admission from 4/3/23 to 4/17/23  · Patient is following with ENT  · MRI scheduled for 6/1/23

## 2023-05-29 NOTE — ASSESSMENT & PLAN NOTE
· Chronic respiratory failure hypoxia   · Patient currently on his baseline O2 of 3 L via NC  · Patient with history of COPD- he's had COVID, RSV, and pneumonia in the last few months    · Continue to follow with Dr John Menchaca- pulmonology outpatient

## 2023-05-29 NOTE — PLAN OF CARE
Problem: MOBILITY - ADULT  Goal: Maintain or return to baseline ADL function  Description: INTERVENTIONS:  -  Assess patient's ability to carry out ADLs; assess patient's baseline for ADL function and identify physical deficits which impact ability to perform ADLs (bathing, care of mouth/teeth, toileting, grooming, dressing, etc )  - Assess/evaluate cause of self-care deficits   - Assess range of motion  - Assess patient's mobility; develop plan if impaired  - Assess patient's need for assistive devices and provide as appropriate  - Encourage maximum independence but intervene and supervise when necessary  - Involve family in performance of ADLs  - Assess for home care needs following discharge   - Consider OT consult to assist with ADL evaluation and planning for discharge  - Provide patient education as appropriate  Outcome: Progressing  Goal: Maintains/Returns to pre admission functional level  Description: INTERVENTIONS:  - Perform BMAT or MOVE assessment daily    - Set and communicate daily mobility goal to care team and patient/family/caregiver  - Collaborate with rehabilitation services on mobility goals if consulted  - Perform Range of Motion 3 times a day  - Reposition patient every 2 hours    - Dangle patient 3 times a day  - Stand patient 3 times a day  - Ambulate patient 3 times a day  - Out of bed to chair 3 times a day   - Out of bed for meals 3 times a day  - Out of bed for toileting  - Record patient progress and toleration of activity level   Outcome: Progressing     Problem: PAIN - ADULT  Goal: Verbalizes/displays adequate comfort level or baseline comfort level  Description: Interventions:  - Encourage patient to monitor pain and request assistance  - Assess pain using appropriate pain scale  - Administer analgesics based on type and severity of pain and evaluate response  - Implement non-pharmacological measures as appropriate and evaluate response  - Consider cultural and social influences on pain and pain management  - Notify physician/advanced practitioner if interventions unsuccessful or patient reports new pain  Outcome: Progressing     Problem: INFECTION - ADULT  Goal: Absence or prevention of progression during hospitalization  Description: INTERVENTIONS:  - Assess and monitor for signs and symptoms of infection  - Monitor lab/diagnostic results  - Monitor all insertion sites, i e  indwelling lines, tubes, and drains  - Monitor endotracheal if appropriate and nasal secretions for changes in amount and color  - Dagmar appropriate cooling/warming therapies per order  - Administer medications as ordered  - Instruct and encourage patient and family to use good hand hygiene technique  - Identify and instruct in appropriate isolation precautions for identified infection/condition  Outcome: Progressing  Goal: Absence of fever/infection during neutropenic period  Description: INTERVENTIONS:  - Monitor WBC    Outcome: Progressing     Problem: SAFETY ADULT  Goal: Maintain or return to baseline ADL function  Description: INTERVENTIONS:  -  Assess patient's ability to carry out ADLs; assess patient's baseline for ADL function and identify physical deficits which impact ability to perform ADLs (bathing, care of mouth/teeth, toileting, grooming, dressing, etc )  - Assess/evaluate cause of self-care deficits   - Assess range of motion  - Assess patient's mobility; develop plan if impaired  - Assess patient's need for assistive devices and provide as appropriate  - Encourage maximum independence but intervene and supervise when necessary  - Involve family in performance of ADLs  - Assess for home care needs following discharge   - Consider OT consult to assist with ADL evaluation and planning for discharge  - Provide patient education as appropriate  Outcome: Progressing  Goal: Maintains/Returns to pre admission functional level  Description: INTERVENTIONS:  - Perform BMAT or MOVE assessment daily    - Set and communicate daily mobility goal to care team and patient/family/caregiver  - Collaborate with rehabilitation services on mobility goals if consulted  - Perform Range of Motion 3 times a day  - Reposition patient every 2 hours    - Dangle patient 3 times a day  - Stand patient 3 times a day  - Ambulate patient 3 times a day  - Out of bed to chair 3 times a day   - Out of bed for meals 3 times a day  - Out of bed for toileting  - Record patient progress and toleration of activity level   Outcome: Progressing  Goal: Patient will remain free of falls  Description: INTERVENTIONS:  - Educate patient/family on patient safety including physical limitations  - Instruct patient to call for assistance with activity   - Consult OT/PT to assist with strengthening/mobility   - Keep Call bell within reach  - Keep bed low and locked with side rails adjusted as appropriate  - Keep care items and personal belongings within reach  - Initiate and maintain comfort rounds  - Make Fall Risk Sign visible to staff  - Offer Toileting every 2 Hours, in advance of need  - Initiate/Maintain bed/chair alarm  - Obtain necessary fall risk management equipment: non skid socks applied, bed in lowest and locked position, call bell within reach  - Apply yellow socks and bracelet for high fall risk patients  - Consider moving patient to room near nurses station  Outcome: Progressing     Problem: DISCHARGE PLANNING  Goal: Discharge to home or other facility with appropriate resources  Description: INTERVENTIONS:  - Identify barriers to discharge w/patient and caregiver  - Arrange for needed discharge resources and transportation as appropriate  - Identify discharge learning needs (meds, wound care, etc )  - Arrange for interpretive services to assist at discharge as needed  - Refer to Case Management Department for coordinating discharge planning if the patient needs post-hospital services based on physician/advanced practitioner order or complex needs related to functional status, cognitive ability, or social support system  Outcome: Progressing     Problem: Knowledge Deficit  Goal: Patient/family/caregiver demonstrates understanding of disease process, treatment plan, medications, and discharge instructions  Description: Complete learning assessment and assess knowledge base    Interventions:  - Provide teaching at level of understanding  - Provide teaching via preferred learning methods  Outcome: Progressing

## 2023-05-29 NOTE — ASSESSMENT & PLAN NOTE
Lab Results   Component Value Date    CREATININE 1 46 (H) 05/29/2023    CREATININE 1 55 (H) 05/28/2023    CREATININE 1 56 (H) 05/28/2023    EGFR 45 05/29/2023    EGFR 41 05/28/2023    EGFR 41 05/28/2023     · Creatinine of 1 56 on admission- baseline appears to be around 0 7-0 8 mg/dL    · Likely due to furosemide use vs obstructive urolopathy with h/o prostate CA  · Patient received 1 L NSS in the ED  · Will hold off on additional IV fluids for now  · Consult nephrology as renal function is not improving with IV fluids/holding lasix   · Urinary retention protocol, obtain PVR   · Check US renal   · Avoid nephrotoxins and hypotension  · Monitor renal function closely

## 2023-05-29 NOTE — ASSESSMENT & PLAN NOTE
· Potassium of 2 9 on outpatient labs- noted to be 2 4 on admission day   · Likely due to forsemide use and CHEYANNE   · Continue to replete and monitor closely

## 2023-05-29 NOTE — PROGRESS NOTES
Martina U  65   Progress Note  Name: Arabella Payne  MRN: [id number]  Unit/Bed#: -01 I Date of Admission: 5/28/2023   Date of Service: 5/29/2023 I Hospital Day: 1    Assessment/Plan   * Hypokalemia  Assessment & Plan  · Potassium of 2 9 on outpatient labs- noted to be 2 4 on admission day   · Likely due to forsemide use and CHEYANNE   · Continue to replete and monitor closely     Acute renal failure superimposed on stage 3a chronic kidney disease [facility])  Assessment & Plan  Lab Results   Component Value Date    CREATININE 1 46 (H) 05/29/2023    CREATININE 1 55 (H) 05/28/2023    CREATININE 1 56 (H) 05/28/2023    EGFR 45 05/29/2023    EGFR 41 05/28/2023    EGFR 41 05/28/2023     · Creatinine of 1 56 on admission- baseline appears to be around 0 7-0 8 mg/dL  · Likely due to furosemide use vs obstructive urolopathy with h/o prostate CA  · Patient received 1 L NSS in the ED  · Will hold off on additional IV fluids for now  · Consult nephrology as renal function is not improving with IV fluids/holding lasix   · Urinary retention protocol, obtain PVR   · Check US renal   · Avoid nephrotoxins and hypotension  · Monitor renal function closely     Sensorineural hearing loss, bilateral  Assessment & Plan  · Patient has had hearing loss since last admission from 4/3/23 to 4/17/23  · Patient is following with ENT  · MRI scheduled for 6/1/23     Prostate cancer [facility])  Assessment & Plan  · Diagnosed in 9/2022; T2 disease with positive MRI and positive fusion biopsy  · Did not appear to have a pelvic bony disease or lymphadenopathy at that time  · Not a candidate for surgery   Received fiducial markers and Space[employer] perirectal    · However, due to recent hospital admissions, treatment including androgen perforation therapy along with radiation therapy are on hold until the patient is cleared by his pulmonology and cardiology  · Continue to follow-up outpatient with Dr Alecia Moreno and Dr Kirill Marie- radiation oncology     Chronic diastolic (congestive) heart failure (HCC)  Assessment & Plan  Wt Readings from Last 3 Encounters:   23 82 1 kg (181 lb)   23 82 3 kg (181 lb 6 4 oz)   23 83 3 kg (183 lb 9 6 oz)     · Patient not in acute exacerbation  · Will hold forusemide in the setting of CHEYANNE and hypokalemia  · Daily weights, I&O's   · Monitor respiratory function closely by holding diuretics         Oxygen dependent  Assessment & Plan  · Chronic respiratory failure hypoxia   · Patient currently on his baseline O2 of 3 L via NC  · Patient with history of COPD- he's had COVID, RSV, and pneumonia in the last few months  · Continue to follow with Dr Mary Kate Hernandez- pulmonology outpatient              VTE Prophylaxis:  Heparin    Patient Centered Rounds: I have performed bedside rounds with nursing staff today  Discussions with Specialists or Other Care Team Provider: CM   Education and Discussions with Family / Patient: patient     Current Length of Stay: 1 day(s)    Current Patient Status: Inpatient   Certification Statement: The patient will continue to require additional inpatient hospital stay due to hypokalemia     Discharge Plan: pending clinical course  Await for nephrology evaluation and repeat labs in AM      Code Status: Level 3 - DNAR and DNI    Subjective:   Feeling okay  Denies any pain  Denies any urinary symptoms  Objective:     Vitals:   Temp (24hrs), Av 6 °F (36 4 °C), Min:97 5 °F (36 4 °C), Max:97 7 °F (36 5 °C)    Temp:  [97 5 °F (36 4 °C)-97 7 °F (36 5 °C)] 97 7 °F (36 5 °C)  HR:  [72-85] 80  Resp:  [18-20] 18  BP: ()/(50-65) 108/56  SpO2:  [95 %-100 %] 97 %  Body mass index is 27 52 kg/m²  Input and Output Summary (last 24 hours): Intake/Output Summary (Last 24 hours) at 2023 1239  Last data filed at 2023 0725  Gross per 24 hour   Intake 540 ml   Output --   Net 540 ml       Physical Exam:   Physical Exam  Vitals and nursing note reviewed  Constitutional:       General: He is not in acute distress  Appearance: Normal appearance  Comments: Elderly      HENT:      Head: Normocephalic and atraumatic  Right Ear: External ear normal       Left Ear: External ear normal       Nose: Nose normal       Mouth/Throat:      Mouth: Mucous membranes are moist       Pharynx: Oropharynx is clear  Eyes:      General:         Right eye: No discharge  Left eye: No discharge  Extraocular Movements: Extraocular movements intact  Pupils: Pupils are equal, round, and reactive to light  Cardiovascular:      Rate and Rhythm: Normal rate and regular rhythm  Pulses: Normal pulses  Heart sounds: Normal heart sounds  No murmur heard  Pulmonary:      Effort: Pulmonary effort is normal  No respiratory distress  Breath sounds: Normal breath sounds  No wheezing or rales  Abdominal:      General: Bowel sounds are normal  There is no distension  Palpations: Abdomen is soft  There is no mass  Tenderness: There is no abdominal tenderness  Musculoskeletal:         General: No swelling, tenderness or deformity  Normal range of motion  Cervical back: Normal range of motion and neck supple  No rigidity  Skin:     General: Skin is warm and dry  Capillary Refill: Capillary refill takes less than 2 seconds  Coloration: Skin is not pale  Findings: No erythema  Neurological:      General: No focal deficit present  Mental Status: He is alert and oriented to person, place, and time  Mental status is at baseline  Psychiatric:         Mood and Affect: Mood normal          Behavior: Behavior normal          Thought Content:  Thought content normal          Judgment: Judgment normal          Additional Data:     Labs:    Results from last 7 days   Lab Units 05/29/23  0432 05/28/23  1151   EOS PCT %  --  4   HEMATOCRIT % 37 2 43 6   HEMOGLOBIN g/dL 11 6* 13 8   LYMPHS PCT %  --  13*   MONOS PCT %  --  12 NEUTROS PCT %  --  70   PLATELETS Thousands/uL 152 177   WBC Thousand/uL 6 37 7 68     Results from last 7 days   Lab Units 05/29/23  0432   ALK PHOS U/L 54   ALT U/L 15   AST U/L 17   BUN mg/dL 13   CALCIUM mg/dL 7 8*   CHLORIDE mmol/L 104   CO2 mmol/L 33*   CREATININE mg/dL 1 46*   POTASSIUM mmol/L 3 1*   SODIUM mmol/L 141         Results from last 7 days   Lab Units 05/29/23  0646 05/28/23  1229   POC GLUCOSE mg/dl 86 112           * I Have Reviewed All Lab Data Listed Above  * Additional Pertinent Lab Tests Reviewed:  Eric 65 Admission  Reviewed    Imaging:  Imaging Reports Reviewed Today Include: CT head and cxr     Recent Cultures (last 7 days):     Results from last 7 days   Lab Units 05/26/23  1555   URINE CULTURE  No Growth <1000 cfu/mL       Last 24 Hours Medication List:   Current Facility-Administered Medications   Medication Dose Route Frequency Provider Last Rate   • acetaminophen  650 mg Oral Q4H PRN Vira Borja PA-C     • aspirin  81 mg Oral HS Vira Borja PA-C     • atorvastatin  80 mg Oral Daily With ROSIBEL Pete     • azithromycin  500 mg Oral Q24H Vira Borja PA-C     • famotidine  20 mg Oral HS Vira Borja PA-C     • formoterol  20 mcg Nebulization Q12H Vira Borja PA-C     • gabapentin  300 mg Oral HS Vira Borja PA-C     • heparin (porcine)  5,000 Units Subcutaneous [facility] Vira Borja PA-C     • ipratropium  0 5 mg Nebulization TID Chyrl Mellow Clemens, DO     • levalbuterol  1 25 mg Nebulization TID Chyrl Mellow Clemens, DO     • metoprolol succinate  12 5 mg Oral Daily Vira Borja PA-C     • ondansetron  4 mg Intravenous Q6H PRN Vira Borja PA-C     • potassium chloride  20 mEq Oral Daily Vira Borja PA-C     • potassium chloride  40 mEq Oral Once INOCENTE Mead     • potassium chloride  20 mEq Intravenous Once INOCENTE Mead          Today, Patient Was Seen By: INOCENTE Mead    ** Please Note: Dictation voice to text software may have been used in the creation of this document   **

## 2023-05-30 VITALS
HEART RATE: 95 BPM | DIASTOLIC BLOOD PRESSURE: 59 MMHG | WEIGHT: 181 LBS | SYSTOLIC BLOOD PRESSURE: 118 MMHG | OXYGEN SATURATION: 91 % | HEIGHT: 68 IN | TEMPERATURE: 97.6 F | RESPIRATION RATE: 18 BRPM | BODY MASS INDEX: 27.43 KG/M2

## 2023-05-30 LAB
ANION GAP SERPL CALCULATED.3IONS-SCNC: 4 MMOL/L (ref 4–13)
ATRIAL RATE: 93 BPM
BUN SERPL-MCNC: 10 MG/DL (ref 5–25)
CALCIUM SERPL-MCNC: 8.1 MG/DL (ref 8.4–10.2)
CHLORIDE SERPL-SCNC: 107 MMOL/L (ref 96–108)
CO2 SERPL-SCNC: 31 MMOL/L (ref 21–32)
CREAT SERPL-MCNC: 1.09 MG/DL (ref 0.6–1.3)
GFR SERPL CREATININE-BSD FRML MDRD: 64 ML/MIN/1.73SQ M
GLUCOSE SERPL-MCNC: 96 MG/DL (ref 65–140)
MAGNESIUM SERPL-MCNC: 2.1 MG/DL (ref 1.9–2.7)
P AXIS: 70 DEGREES
POTASSIUM SERPL-SCNC: 3.5 MMOL/L (ref 3.5–5.3)
PR INTERVAL: 160 MS
QRS AXIS: 62 DEGREES
QRSD INTERVAL: 102 MS
QT INTERVAL: 356 MS
QTC INTERVAL: 442 MS
SODIUM SERPL-SCNC: 142 MMOL/L (ref 135–147)
T WAVE AXIS: 236 DEGREES
VENTRICULAR RATE: 93 BPM

## 2023-05-30 RX ORDER — POTASSIUM CHLORIDE 20 MEQ/1
40 TABLET, EXTENDED RELEASE ORAL DAILY
Qty: 6 TABLET | Refills: 0 | Status: SHIPPED | OUTPATIENT
Start: 2023-05-31 | End: 2023-06-02 | Stop reason: SDUPTHER

## 2023-05-30 RX ORDER — FUROSEMIDE 40 MG/1
40 TABLET ORAL DAILY PRN
Qty: 100 TABLET | Refills: 0
Start: 2023-05-30

## 2023-05-30 RX ORDER — POTASSIUM CHLORIDE 20 MEQ/1
40 TABLET, EXTENDED RELEASE ORAL DAILY
Status: DISCONTINUED | OUTPATIENT
Start: 2023-05-30 | End: 2023-05-30 | Stop reason: HOSPADM

## 2023-05-30 RX ADMIN — IPRATROPIUM BROMIDE 0.5 MG: 0.5 SOLUTION RESPIRATORY (INHALATION) at 14:34

## 2023-05-30 RX ADMIN — LEVALBUTEROL HYDROCHLORIDE 1.25 MG: 1.25 SOLUTION RESPIRATORY (INHALATION) at 08:10

## 2023-05-30 RX ADMIN — FORMOTEROL FUMARATE DIHYDRATE 20 MCG: 20 SOLUTION RESPIRATORY (INHALATION) at 08:10

## 2023-05-30 RX ADMIN — IPRATROPIUM BROMIDE 0.5 MG: 0.5 SOLUTION RESPIRATORY (INHALATION) at 08:10

## 2023-05-30 RX ADMIN — AZITHROMYCIN MONOHYDRATE 500 MG: 250 TABLET ORAL at 15:17

## 2023-05-30 RX ADMIN — POTASSIUM CHLORIDE 40 MEQ: 1500 TABLET, EXTENDED RELEASE ORAL at 09:29

## 2023-05-30 RX ADMIN — LEVALBUTEROL HYDROCHLORIDE 1.25 MG: 1.25 SOLUTION RESPIRATORY (INHALATION) at 14:34

## 2023-05-30 RX ADMIN — HEPARIN SODIUM 5000 UNITS: 5000 INJECTION INTRAVENOUS; SUBCUTANEOUS at 05:53

## 2023-05-30 NOTE — ASSESSMENT & PLAN NOTE
Lab Results   Component Value Date    CREATININE 1 09 05/30/2023    CREATININE 1 46 (H) 05/29/2023    CREATININE 1 55 (H) 05/28/2023    EGFR 64 05/30/2023    EGFR 45 05/29/2023    EGFR 41 05/28/2023     · Creatinine of 1 56 on admission- baseline appears to be around 0 7-0 8 mg/dL    · Likely due to furosemide use with poor PO intake associated with weight loss   · Patient received 1 L NSS in the ED  · Nephrology input appreciated  · Urinary retention protocol, PVR 28 ml   · Avoid nephrotoxins and hypotension  · Renal function has significantly improved   · May resume lasix on an as-needed basis weight gain or worsening lower extremities edema   · Recommend to follow up with cardiology outpatient

## 2023-05-30 NOTE — ASSESSMENT & PLAN NOTE
· Chronic respiratory failure hypoxia   · Patient currently on his baseline O2 of 3 L via NC  · Patient with history of COPD- he's had COVID, RSV, and pneumonia in the last few months    · Continue to follow with Dr Alma Harrell- pulmonology outpatient

## 2023-05-30 NOTE — UTILIZATION REVIEW
Initial Clinical Review    Admission: Date/Time/Statement:   Admission Orders (From admission, onward)     Ordered        05/28/23 1354  1 Medical [facility],[address] [place]  Once                      Orders Placed This Encounter   Procedures   • INPATIENT ADMISSION     Standing Status:   Standing     Number of Occurrences:   1     Order Specific Question:   Level of Care     Answer:   Med Surg [16]     Order Specific Question:   Estimated length of stay     Answer:   More than 2 Midnights     Order Specific Question:   Certification     Answer:   I certify that inpatient services are medically necessary for this patient for a duration of greater than two midnights  See H&P and MD Progress Notes for additional information about the patient's course of treatment  ED Arrival Information     Expected   5/28/2023     Arrival   5/28/2023 11:04    Acuity   Urgent            Means of arrival   Walk-In    Escorted by   Family Member    Service   Hospitalist    Admission type   Emergency            Arrival complaint   CHEYANNE (acute kidney injury) [facility])           Chief Complaint   Patient presents with   • Abnormal Lab     Abnormal potassium and renal        Initial Presentation: 77 y o  male to the ED from home with complaints of abnormal labs from May 26  Admitted to inpatient for hypokalemia  H/o COPD, CHF, CKD, oxygen depending, hearing loss   Potassium was 2 9, creat 1 61 with baseline 0 7-0 8  On arrival to the ED, potassium 2 4  Has been having tremors for the past month and now has hearing loss since last months admission  Due to have MRI 6/1  ON arrival, pulse ox 85% on room air  Given IV and po potassium supplement  Trend potassium  No ekg changes  Hypokalemia likely due to diuretic  Given 1 liter iV fluids in the ED  Date: 5/29   Day 2: Urinary retention protocol  Nephrology consult  Renal function not improving with IV fluids/holding lasix  Check US renal   Lungs clear         ED Triage Vitals   Temperature Pulse Respirations Blood Pressure SpO2   05/28/23 1133 05/28/23 1133 05/28/23 1133 05/28/23 1133 05/28/23 1135   97 6 °F (36 4 °C) 57 20 127/76 (!) 85 %      Temp Source Heart Rate Source Patient Position - Orthostatic VS BP Location FiO2 (%)   05/28/23 1133 05/28/23 1133 05/28/23 1133 05/28/23 1133 --   Temporal Monitor Sitting Left arm       Pain Score       05/28/23 1133       No Pain          Wt Readings from Last 1 Encounters:   05/28/23 82 1 kg (181 lb)     Additional Vital Signs:   Date/Time Temp Pulse Resp BP MAP (mmHg) SpO2 Calculated FIO2 (%) - Nasal Cannula Nasal Cannula O2 Flow Rate (L/min) O2 Device O2 Interface Device Patient Position - Orthostatic VS   05/29/23 22:30:15 98 2 °F (36 8 °C) -- 18 103/50 68 -- -- -- -- -- --   05/29/23 2033 -- -- -- -- -- 100 % -- -- -- -- --   05/29/23 2030 -- -- -- -- -- -- -- -- Nasal cannula -- --   05/29/23 2011 -- 84 18 -- -- 94 % 32 3 L/min Nasal cannula -- --   05/29/23 13:32:47 97 8 °F (36 6 °C) 83 18 128/61 83 99 % -- -- -- -- --   05/29/23 1321 -- -- -- -- -- 95 % 32 3 L/min Nasal cannula -- --   05/29/23 11:07:05 97 7 °F (36 5 °C) 80 18 108/56 73 97 % -- -- -- -- --   05/29/23 11:06:07 -- 84 -- 95/57 70 97 % -- -- -- -- --   05/29/23 07:25:26 97 7 °F (36 5 °C) 72 18 109/65 80 99 % -- -- -- -- --   05/29/23 02:43:44 97 5 °F (36 4 °C) 75 18 99/59 72 96 % -- -- -- -- --   05/28/23 2315 -- -- -- -- -- -- -- -- -- Face mask --   05/28/23 2016 -- -- -- -- -- -- 32 3 L/min Nasal cannula -- --   05/28/23 19:06:18 97 5 °F (36 4 °C) 82 18 94/50 65 95 % -- -- -- -- --   05/28/23 15:30:59 97 7 °F (36 5 °C) 85 20 97/52 67 100 % 32 3 L/min Nasal cannula -- --   05/28/23 1135 -- -- -- -- -- 85 % Abnormal   32 3 L/min Nasal cannula -- --   SpO2: family states this is a normal level for him at 05/28/23 1135   05/28/23 1133 97 6 °F (36 4 °C) 57 20 127/76 -- -- -- -- -- -- Sitting       Pertinent Labs/Diagnostic Test Results:   5/28 EKG: Sinus rhythm with frequent Premature ventricular complexes and Premature atrial complexes  Nonspecific ST-t wave changes  When compared with ECG of 28-APR-2023 16:06,  No significant change was found  CT head wo contrast   Final Result by Zana Cade MD (05/28 1216)      No evidence of acute intracranial process or significant interval change  Chronic microangiopathy  Workstation performed: [id number]         XR chest 1 view portable   Final Result by Nathanael Salcido MD (05/29 1149)      Bilateral lower lobe infiltrates, right greater than left  Severe emphysema                 Workstation performed: [id number]               Results from last 7 days   Lab Units 05/29/23  0432 05/28/23  1151 05/26/23  1555   HEMATOCRIT % 37 2 43 6 42 7   HEMOGLOBIN g/dL 11 6* 13 8 13 8   NEUTROS ABS Thousands/µL  --  5 36 6 66   PLATELETS Thousands/uL 152 177 192   WBC Thousand/uL 6 37 7 68 9 30         Results from last 7 days   Lab Units 05/29/23  0432 05/28/23 2049 05/28/23  1151 05/26/23  1555   ANION GAP mmol/L 4 6 10 0*   BUN mg/dL 13 13 14 16   CALCIUM mg/dL 7 8* 8 2* 8 9 9 0   CHLORIDE mmol/L 104 100 95* 102   CO2 mmol/L 33* 37* 35* 35*   CREATININE mg/dL 1 46* 1 55* 1 56* 1 61*   EGFR ml/min/1 73sq m 45 41 41 40   POTASSIUM mmol/L 3 1* 2 8* 2 4* 2 9*   MAGNESIUM mg/dL 2 1  --  2 3  --    SODIUM mmol/L 141 143 140 137     Results from last 7 days   Lab Units 05/29/23  0432 05/26/23  1555   ALBUMIN g/dL 3 0* 3 0*   ALK PHOS U/L 54 80   ALT U/L 15 30   AST U/L 17 30   TOTAL BILIRUBIN mg/dL 0 62 0 66   TOTAL PROTEIN g/dL 4 9* 6 5     Results from last 7 days   Lab Units 05/29/23  0646 05/28/23  1229   POC GLUCOSE mg/dl 86 112     Results from last 7 days   Lab Units 05/29/23  0432 05/28/23  2049 05/28/23  1151   GLUCOSE RANDOM mg/dL 101 107 134             Results from last 7 days   Lab Units 05/26/23  1555   TSH 3RD GENERATON uIU/mL 0 583       Results from last 7 days   Lab Units 05/28/23  1151 05/26/23  1555   BNP pg/mL 111* 96         Results from last 7 days   Lab Units 05/28/23  1222   BACTERIA UA /hpf None Seen   BILIRUBIN UA  Negative   BLOOD UA  1+*   CLARITY UA  Clear   COLOR UA  Yellow   EPITHELIAL CELLS WET PREP /hpf Occasional   GLUCOSE UA mg/dl Negative   KETONES UA mg/dl Negative   LEUKOCYTES UA  Negative   NITRITE UA  Negative   PH UA  6 0   PROTEIN UA mg/dl Negative   RBC UA /hpf 0-1   SPEC GRAV UA  1 010   UROBILINOGEN UA E U /dl 0 2   WBC UA /hpf None Seen         Results from last 7 days   Lab Units 05/26/23  1555   URINE CULTURE  No Growth <1000 cfu/mL     ED Treatment:   Medication Administration from 05/28/2023 0911 to 05/28/2023 1439       Date/Time Order Dose Route Action Comments     05/28/2023 1158 EDT sodium chloride 0 9 % bolus 1,000 mL 1,000 mL Intravenous New Bag --     05/28/2023 1424 EDT potassium chloride 20 mEq IVPB (premix) 20 mEq Intravenous New Bag --     05/28/2023 1215 EDT potassium chloride 20 mEq IVPB (premix) 20 mEq Intravenous New Bag --     05/28/2023 1351 EDT potassium chloride (K-DUR,KLOR-CON) CR tablet 40 mEq 40 mEq Oral Given --        Past Medical History:   Diagnosis Date   • Abnormal ECG 2021 OR 2022   • Alcoholism ([facility] ) 1984    sober 38 years   • Arthritis 2008    beginning   • Bilateral carotid artery stenosis    • Callus    • Cancer (HCC)     skin   • Chronic diastolic heart failure (HCC)    • Chronic ischemic heart disease    • Chronic kidney disease     stage 3   • Colon polyp    • COPD (chronic obstructive pulmonary disease) (HCC)    • Coronary artery disease     hx stents, MI, PCI   • COVID 11/2021   • CPAP (continuous positive airway pressure) dependence    • Disease of thyroid gland 2017    nodules   • Emphysema of lung ([facility] ) 1995    started   • Hearing loss    • History of transfusion 1995   • Hyperlipidemia    • Hypertension    • Intracranial aneurysm 04/25/2023   • Lung nodule 2023    x rays   • MI (myocardial infarction) ([facility] ) 1995   • Myocardial infarction ([facility] ) 1995 • Pneumonia    • Pneumothorax 02/20/2023    collapsed lung   • Pro[facility] ([facility] )    • RSV (respiratory syncytial virus infection) 10/2022   • S/P carotid endarterectomy    • Shortness of breath     O2 2 l/nc PRN   • Sleep apnea    • Sleep apnea, obstructive    • Stented coronary artery      Admitting Diagnosis: Hypokalemia [E87 6]  Weakness [R53 1]  Abnormal laboratory test [R89 9]  Acute kidney injury ([facility] ) [N17 9]  Age/Sex: 77 y o  male  Admission Orders:  Scheduled Medications:  aspirin, 81 mg, Oral, HS  atorvastatin, 80 mg, Oral, Daily With Dinner  azithromycin, 500 mg, Oral, Q24H  famotidine, 20 mg, Oral, HS  formoterol, 20 mcg, Nebulization, Q12H  gabapentin, 300 mg, Oral, HS  heparin (porcine), 5,000 Units, Subcutaneous, Q8H SVETLANA  ipratropium, 0 5 mg, Nebulization, TID  levalbuterol, 1 25 mg, Nebulization, TID  metoprolol succinate, 12 5 mg, Oral, Daily  potassium chloride, 40 mEq, Oral, Daily      Continuous IV Infusions:     PRN Meds:  acetaminophen, 650 mg, Oral, Q4H PRN  ondansetron, 4 mg, Intravenous, Q6H PRN        IP CONSULT TO NEPHROLOGY    Network Utilization Review Department  ATTENTION: Please call with any questions or concerns to [phone] and carefully listen to the prompts so that you are directed to the right person  All voicemails are confidential   [employer] all requests for admission clinical reviews, approved or denied determinations and any other requests to dedicated fax number below belonging to the campus where the patient is receiving treatment   List of dedicated fax numbers for the Facilities:  [address] DENIALS (Administrative/Medical Necessity) [phone]   1000  [facility] (Maternity/NICU/Pediatrics) [phone]   [address] [phone]   Peggy Bowie  [phone]   [facility] [address] - Elle Mohs [address] [place] [address] Myrna Chadwick [address] [facility] [address] [phone]

## 2023-05-30 NOTE — ASSESSMENT & PLAN NOTE
Wt Readings from Last 3 Encounters:   05/28/23 82 1 kg (181 lb)   05/26/23 82 3 kg (181 lb 6 4 oz)   05/19/23 83 3 kg (183 lb 9 6 oz)     · Patient not in acute exacerbation  · Will hold forusemide in the setting of CHEYANNE and hypokalemia  · Recommend to follow up with outpatient cardiology

## 2023-05-30 NOTE — DISCHARGE SUMMARY
Louieruthlance 44  Discharge- [facility] 1945, 77 y o  male MRN: [id number]  Unit/Bed#: -01 Encounter: [id number]  Primary Care Provider: Jerry Whittaker DO   Date and time admitted to hospital: 5/28/2023 11:37 AM    * Hypokalemia  Assessment & Plan  · Potassium of 2 9 on outpatient labs- noted to be 2 4 on admission day   · Likely due to forsemide use and CHEYANNE   · Follow up with BMP in 3 days  · Continue with potassium supplement 40 mEq daily for now   · Goal for K >4 and Mg >2    Acute renal failure superimposed on stage 3a chronic kidney disease [facility])  Assessment & Plan  Lab Results   Component Value Date    CREATININE 1 09 05/30/2023    CREATININE 1 46 (H) 05/29/2023    CREATININE 1 55 (H) 05/28/2023    EGFR 64 05/30/2023    EGFR 45 05/29/2023    EGFR 41 05/28/2023     · Creatinine of 1 56 on admission- baseline appears to be around 0 7-0 8 mg/dL  · Likely due to furosemide use with poor PO intake associated with weight loss   · Patient received 1 L NSS in the ED  · Nephrology input appreciated  · Urinary retention protocol, PVR 28 ml   · Avoid nephrotoxins and hypotension  · Renal function has significantly improved   · May resume lasix on an as-needed basis weight gain or worsening lower extremities edema or worsening shortness of breath   · Recommend to follow up with cardiology outpatient     Sensorineural hearing loss, bilateral  Assessment & Plan  · Patient has had hearing loss since last admission from 4/3/23 to 4/17/23  · Patient is following with ENT  · MRI scheduled for 6/1/23      Prostate cancer [facility])  Assessment & Plan  · Diagnosed in 9/2022; T2 disease with positive MRI and positive fusion biopsy  · Did not appear to have a pelvic bony disease or lymphadenopathy at that time  · Not a candidate for surgery   Received fiducial markers and SpaceOAR perirectal    · However, due to recent hospital admissions, treatment including androgen perforation therapy along with radiation therapy are on hold until the patient is cleared by his pulmonology and cardiology  · Continue to follow-up outpatient with Dr Dana Ceballos and Dr Erika Danielson- radiation oncology      Chronic diastolic (congestive) heart failure [facility])  Assessment & Plan  Wt Readings from Last 3 Encounters:   05/28/23 82 1 kg (181 lb)   05/26/23 82 3 kg (181 lb 6 4 oz)   05/19/23 83 3 kg (183 lb 9 6 oz)     · Patient not in acute exacerbation  · Will hold forusemide in the setting of CHEYANNE and hypokalemia  · Recommend to follow up with outpatient cardiology         Oxygen dependent  Assessment & Plan  · Chronic respiratory failure hypoxia   · Patient currently on his baseline O2 of 3 L via NC  · Patient with history of COPD- he's had COVID, RSV, and pneumonia in the last few months  · Continue to follow with Dr Bradley Han- pulmonology outpatient          Discharging Physician / Practitioner: INOCENTE Prado  PCP: Destinee Patterson DO  Admission Date:   Admission Orders (From admission, onward)     Ordered        05/28/23 1354  INPATIENT ADMISSION  Once                      Discharge Date: 05/30/23    Medical Problems     Resolved Problems  Date Reviewed: 5/30/2023   None         Consultations During Hospital Stay:  · IP CONSULT TO NEPHROLOGY      Procedures Performed:   XR chest 1 view portable  Result Date: 5/29/2023  Bilateral lower lobe infiltrates, right greater than left  Severe emphysema  CT head wo contrast  Result Date: 5/28/2023  No evidence of acute intracranial process or significant interval change  Chronic microangiopathy  Significant Findings / Test Results:   · Refer to above     Incidental Findings:   · None      Test Results Pending at Discharge (will require follow up):    None      Outpatient Tests Requested:  · Follow up with PCP and nephrology if needed     Complications:  None     Reason for Admission: Abnormal Lab (Abnormal potassium and renal )        Hospital Course:     Crispin Dooley "Brooke Gama is a 77 y o  male patient who originally presented to the hospital on 5/28/2023 due to abnormal labs-acute kidney injury and hypokalemia  The patient was noted to have abnormal labs and was recommended to come into the ED for further evaluation and treatment by his PCP  The patient found to have acute kidney injury which suspected to be due to furosemide use and poor p o  intake associated with weight loss  Nephrology evaluation done  At this time kidney function has much improved  Nephrology recommended to monitor BMP closely as an outpatient  Recommend to continue with furosemide 40 mg daily as needed only if there is a weight gain more than 3 pounds over 24 hours or worsening lower extremity edema  The patient is to follow-up with his outpatient cardiologist for further monitoring  Please see above list of diagnoses and related plan for additional information  Condition at Discharge: good     Discharge Day Visit / Exam:     Subjective:  Feeling much better  Would like to go home  Vitals: Blood Pressure: 104/61 (05/30/23 0658)  Pulse: 71 (05/30/23 0658)  Temperature: 97 8 °F (36 6 °C) (05/30/23 0658)  Temp Source: Temporal (05/28/23 1133)  Respirations: 17 (05/30/23 0658)  Height: 5' 8\" (172 7 cm) (05/28/23 1133)  Weight - Scale: 82 1 kg (181 lb) (05/28/23 1133)  SpO2: 97 % (05/30/23 9192)  Exam:   Physical Exam  Vitals and nursing note reviewed  Constitutional:       General: He is not in acute distress  Appearance: Normal appearance  HENT:      Head: Normocephalic and atraumatic  Right Ear: External ear normal       Left Ear: External ear normal       Nose: Nose normal       Mouth/Throat:      Mouth: Mucous membranes are moist       Pharynx: Oropharynx is clear  Eyes:      General:         Right eye: No discharge  Left eye: No discharge  Extraocular Movements: Extraocular movements intact  Pupils: Pupils are equal, round, and reactive to light   " Cardiovascular:      Rate and Rhythm: Normal rate and regular rhythm  Pulses: Normal pulses  Heart sounds: Normal heart sounds  No murmur heard  Pulmonary:      Effort: Pulmonary effort is normal  No respiratory distress  Breath sounds: Normal breath sounds  No wheezing or rales  Abdominal:      General: Bowel sounds are normal  There is no distension  Palpations: Abdomen is soft  There is no mass  Tenderness: There is no abdominal tenderness  Musculoskeletal:         General: No swelling, tenderness or deformity  Normal range of motion  Cervical back: Normal range of motion and neck supple  No rigidity  Skin:     General: Skin is warm and dry  Capillary Refill: Capillary refill takes less than 2 seconds  Coloration: Skin is not pale  Findings: No erythema  Neurological:      General: No focal deficit present  Mental Status: He is alert and oriented to person, place, and time  Mental status is at baseline  Psychiatric:         Mood and Affect: Mood normal          Behavior: Behavior normal          Thought Content: Thought content normal          Judgment: Judgment normal        Discussion with Family: none present during exam     Discharge instructions/Information to patient and family:   See after visit summary for information provided to patient and family  Provisions for Follow-Up Care:  See after visit summary for information related to follow-up care and any pertinent home health orders  Disposition:     Home with [facility]NA Services (Reminder: Complete face to face encounter)    Planned Readmission: no      Discharge Statement:  I spent 38 minutes discharging the patient  This time was spent on the day of discharge  I had direct contact with the patient on the day of discharge   Greater than 50% of the total time was spent examining patient, answering all patient questions, arranging and discussing plan of care with patient as well as directly providing post-discharge instructions  Additional time then spent on discharge activities  Discharge Medications:  See after visit summary for reconciled discharge medications provided to patient and family        ** Please Note: This note has been constructed using a voice recognition system **

## 2023-05-30 NOTE — PLAN OF CARE
Problem: SAFETY ADULT  Goal: Maintain or return to baseline ADL function  Description: INTERVENTIONS:  -  Assess patient's ability to carry out ADLs; assess patient's baseline for ADL function and identify physical deficits which impact ability to perform ADLs (bathing, care of mouth/teeth, toileting, grooming, dressing, etc )  - Assess/evaluate cause of self-care deficits   - Assess range of motion  - Assess patient's mobility; develop plan if impaired  - Assess patient's need for assistive devices and provide as appropriate  - Encourage maximum independence but intervene and supervise when necessary  - Involve family in performance of ADLs  - Assess for home care needs following discharge   - Consider OT consult to assist with ADL evaluation and planning for discharge  - Provide patient education as appropriate  Outcome: Progressing  Goal: Maintains/Returns to pre admission functional level  Description: INTERVENTIONS:  - Perform BMAT or MOVE assessment daily    - Set and communicate daily mobility goal to care team and patient/family/caregiver  - Collaborate with rehabilitation services on mobility goals if consulted  - Perform Range of Motion 3 times a day  - Reposition patient every 2 hours    - Dangle patient 3 times a day  - Stand patient 3 times a day  - Ambulate patient 3 times a day  - Out of bed to chair 3 times a day   - Out of bed for meals 3 times a day  - Out of bed for toileting  - Record patient progress and toleration of activity level   Outcome: Progressing  Goal: Patient will remain free of falls  Description: INTERVENTIONS:  - Educate patient/family on patient safety including physical limitations  - Instruct patient to call for assistance with activity   - Consult OT/PT to assist with strengthening/mobility   - Keep Call bell within reach  - Keep bed low and locked with side rails adjusted as appropriate  - Keep care items and personal belongings within reach  - Initiate and maintain comfort rounds  - Make Fall Risk Sign visible to staff  - Offer Toileting every 2 Hours, in advance of need  - Initiate/Maintain bed/chair alarm at all times  - Obtain necessary fall risk management equipment: non skid socks applied, bed in lowest and locked position, call bell within reach  - Apply yellow socks and bracelet for high fall risk patients  - Consider moving patient to room near nurses station  Outcome: Progressing

## 2023-05-30 NOTE — ASSESSMENT & PLAN NOTE
· Potassium of 2 9 on outpatient labs- noted to be 2 4 on admission day   · Likely due to forsemide use and CHEYANNE   · Follow up with BMP in 3 days  · Continue with potassium supplement 40 mEq daily for now   · Goal for K >4 and Mg >2

## 2023-05-30 NOTE — PLAN OF CARE
Problem: OCCUPATIONAL THERAPY ADULT  Goal: Performs self-care activities at highest level of function for planned discharge setting  See evaluation for individualized goals  Description: Treatment Interventions: ADL retraining, Functional transfer training, Endurance training, Patient/family training, Compensatory technique education, Energy conservation, Activityengagement    See flowsheet documentation for full assessment, interventions and recommendations  Note: Limitation: Decreased ADL status, Decreased Safe judgement during ADL, Decreased endurance, Decreased self-care trans, Decreased high-level ADLs  Prognosis: Good  Assessment: Pt is a 77 y o  male seen for OT evaluation s/p admit to [facility] on 5/28/2023 w/ Hypokalemia  Comorbidities affecting pt's functional performance at time of assessment include: HLD, CAD, COPD, Depression, Vertigo, OA, Obesity, Dysphagia  Personal factors affecting pt at time of IE include:steps to enter environment and difficulty performing ADLS  Prior to admission, pt was Mod I with ADLs  Upon evaluation: the following deficits impact occupational performance: decreased balance and decreased tolerance  Pt to benefit from continued skilled OT tx while in the hospital to address deficits as defined above and maximize level of functional independence w ADL's and functional mobility  Occupational Performance areas to address include: bathing/shower, toilet hygiene, dressing, functional mobility and clothing management  From OT standpoint, recommendation at time of d/c would be Home OT       OT Discharge Recommendation: Home with home health rehabilitation     [address] [place] [address] Road, [place], OTR/L

## 2023-05-30 NOTE — PLAN OF CARE
Problem: MOBILITY - ADULT  Goal: Maintain or return to baseline ADL function  Description: INTERVENTIONS:  -  Assess patient's ability to carry out ADLs; assess patient's baseline for ADL function and identify physical deficits which impact ability to perform ADLs (bathing, care of mouth/teeth, toileting, grooming, dressing, etc )  - Assess/evaluate cause of self-care deficits   - Assess range of motion  - Assess patient's mobility; develop plan if impaired  - Assess patient's need for assistive devices and provide as appropriate  - Encourage maximum independence but intervene and supervise when necessary  - Involve family in performance of ADLs  - Assess for home care needs following discharge   - Consider OT consult to assist with ADL evaluation and planning for discharge  - Provide patient education as appropriate  5/30/2023 1508 by Alia Bernabe RN  Outcome: Adequate for Discharge  5/30/2023 1103 by Alia Bernabe RN  Outcome: Progressing  Goal: Maintains/Returns to pre admission functional level  Description: INTERVENTIONS:  - Perform BMAT or MOVE assessment daily    - Set and communicate daily mobility goal to care team and patient/family/caregiver  - Collaborate with rehabilitation services on mobility goals if consulted  - Perform Range of Motion 3 times a day  - Reposition patient every 2 hours    - Dangle patient 3 times a day  - Stand patient 3 times a day  - Ambulate patient 3 times a day  - Out of bed to chair 3 times a day   - Out of bed for meals 3 times a day  - Out of bed for toileting  - Record patient progress and toleration of activity level   5/30/2023 1508 by Alia Bernabe RN  Outcome: Adequate for Discharge  5/30/2023 1103 by Alia Bernabe RN  Outcome: Progressing     Problem: PAIN - ADULT  Goal: Verbalizes/displays adequate comfort level or baseline comfort level  Description: Interventions:  - Encourage patient to monitor pain and request assistance  - Assess pain using appropriate pain scale  - Administer analgesics based on type and severity of pain and evaluate response  - Implement non-pharmacological measures as appropriate and evaluate response  - Consider cultural and social influences on pain and pain management  - Notify physician/advanced practitioner if interventions unsuccessful or patient reports new pain  5/30/2023 1508 by Cain Moore RN  Outcome: Adequate for Discharge  5/30/2023 1103 by Cain Moore RN  Outcome: Progressing     Problem: INFECTION - ADULT  Goal: Absence or prevention of progression during hospitalization  Description: INTERVENTIONS:  - Assess and monitor for signs and symptoms of infection  - Monitor lab/diagnostic results  - Monitor all insertion sites, i e  indwelling lines, tubes, and drains  - Monitor endotracheal if appropriate and nasal secretions for changes in amount and color  - Gary appropriate cooling/warming therapies per order  - Administer medications as ordered  - Instruct and encourage patient and family to use good hand hygiene technique  - Identify and instruct in appropriate isolation precautions for identified infection/condition  5/30/2023 1508 by Cain Moore RN  Outcome: Adequate for Discharge  5/30/2023 1103 by Cain Moore RN  Outcome: Progressing  Goal: Absence of fever/infection during neutropenic period  Description: INTERVENTIONS:  - Monitor WBC    5/30/2023 1508 by Cain Moore RN  Outcome: Adequate for Discharge  5/30/2023 1103 by Cain Moore RN  Outcome: Progressing     Problem: INFECTION - ADULT  Goal: Absence of fever/infection during neutropenic period  Description: INTERVENTIONS:  - Monitor WBC    5/30/2023 1508 by Cain Moore RN  Outcome: Adequate for Discharge  5/30/2023 1103 by Cain Moore RN  Outcome: Progressing

## 2023-05-30 NOTE — SPEECH THERAPY NOTE
Speech Language/Pathology  Speech/Language Pathology  Assessment    Patient Name: Sesar WARREN Date: 5/30/2023     Problem List  Principal Problem:    Hypokalemia  Active Problems:    Oxygen dependent    Chronic diastolic (congestive) heart failure (HCC)    Acute renal failure superimposed on stage 3a chronic kidney disease (HCC)    Prostate cancer ([facility] )    Sensorineural hearing loss, bilateral    Past Medical History  Past Medical History:   Diagnosis Date   • Abnormal ECG 2021 OR 2022   • Alcoholism ([facility] ) 1984    sober 38 years   • Arthritis 2008    beginning   • Bilateral carotid artery stenosis    • Callus    • Cancer (HCC)     skin   • Chronic diastolic heart failure (HCC)    • Chronic ischemic heart disease    • Chronic kidney disease     stage 3   • Colon polyp    • COPD (chronic obstructive pulmonary disease) ([facility])    • Coronary artery disease     hx stents, MI, PCI   • COVID 11/2021   • CPAP (continuous positive airway pressure) dependence    • Disease of thyroid gland 2017    nodules   • Emphysema of lung ([facility] ) 1995    started   • Hearing loss    • History of transfusion 1995   • Hyperlipidemia    • Hypertension    • Intracranial aneurysm 04/25/2023   • Lung nodule 2023    x rays   • MI (myocardial infarction) ([facility] ) 1995   • Myocardial infarction ([facility] ) 1995   • Pneumonia    • Pneumothorax 02/20/2023    collapsed lung   • Prostate cancer ([facility] )    • RSV (respiratory syncytial virus infection) 10/2022   • S/P carotid endarterectomy    • Shortness of breath     O2 2 l/nc PRN   • Sleep apnea    • Sleep apnea, obstructive    • Stented coronary artery      Past Surgical History  Past Surgical History:   Procedure Laterality Date   • APPENDECTOMY     • CARDIAC CATHETERIZATION  03/18/2021    left main with no significant disease, proximal LAD with 10% stenosis at the site of prior stent, left circumflex artery with minimal luminal irregularities mid RCA with 50% stenosis at site of prior stent and PL segment with distal disease supplied by collaterals from the distal circumflex with no significant CAD requiring revascularization at that time  • CATARACT EXTRACTION, BILATERAL Bilateral    • COLONOSCOPY     • CORONARY ANGIOPLASTY WITH STENT PLACEMENT  1999    RCA   • CORONARY ANGIOPLASTY WITH STENT PLACEMENT  2001    RCA   • CORONARY ANGIOPLASTY WITH STENT PLACEMENT  2003    LAD   • EYE SURGERY     • AK BX PROSTATE STRTCTC SATURATION SAMPLING IMG GID N/A 09/13/2022    Procedure: TRANSPERINEAL MRI FUSION  BIOPSY PROSTATE;  Surgeon: Niharika Nichole MD;  Location: [facility];  Service: Urology   • AK NEUROPLASTY &/TRANSPOS MEDIAN NRV CARPAL Beacher Roll Left 07/22/2022    Procedure: RELEASE CARPAL TUNNEL;  Surgeon: Breana Salgado MD;  Location: [facility];  Service: Orthopedics   • AK NEUROPLASTY &/TRANSPOSITION ULNAR NERVE ELBOW Left 07/22/2022    Procedure: RELEASE CUBITAL TUNNEL;  Surgeon: Breana Salgado MD;  Location: [facility];  Service: Orthopedics   • AK NEUROPLASTY &/TRANSPOSITION ULNAR NERVE WRIST Left 07/22/2022    Procedure: Peng Ralphs RELEASE;  Surgeon: Breana Salgado MD;  Location: [facility];  Service: Orthopedics   • SKIN CANCER EXCISION  2012    chin-per pt, basal cell  also right ankle   • TONSILLECTOMY  no       Orders received and appreciated, chart review completed  Patient very familiar to this discipline as he was on caseload during last admission  Patient was advanced at that time from NPO to mechanical and NTL upon discharge with eventual transition to regular items  Pt presents today with mild congested vocal quality, continues on NTL at home  Patient's O2 needs have decreased since his last admission, reporting he fluctuates between 2-3L at home, sometimes up to 4L during [place] [id number] PT sessions  He endorses improved breathing ability at this time  He is receptive to OP VBS upon discharge to determine safest overall PO diet  Pt being discharged from [facility] at this time

## 2023-05-30 NOTE — OCCUPATIONAL THERAPY NOTE
Occupational Therapy Evaluation      Jazz Olivarez    5/30/2023    Principal Problem:    Hypokalemia  Active Problems:    Oxygen dependent    Chronic diastolic (congestive) heart failure (HCC)    Acute renal failure superimposed on stage 3a chronic kidney disease (HCC)    Prostate cancer ([facility] [address] )    Sensorineural hearing loss, bilateral      Past Medical History:   Diagnosis Date    Abnormal ECG 2021 OR 2022    Alcoholism ([facility] [address] ) 1984    sober 38 years    Arthritis 2008    beginning    Bilateral carotid artery stenosis     Callus     Cancer (HCC)     skin    Chronic diastolic heart failure (HCC)     Chronic ischemic heart disease     Chronic kidney disease     stage 3    Colon polyp     COPD (chronic obstructive pulmonary disease) ([facility])     Coronary artery disease     hx stents, MI, PCI    COVID 11/2021    CPAP (continuous positive airway pressure) dependence     Disease of thyroid gland 2017    nodules    Emphysema of lung ([facility] [address] ) 1995    started    Hearing loss     History of transfusion 1995    Hyperlipidemia     Hypertension     Intracranial aneurysm 04/25/2023    Lung nodule 2023    x rays    MI (myocardial infarction) ([facility] [address] ) 1995    Myocardial infarction ([facility] ) 1995    Pneumonia     Pneumothorax 02/20/2023    collapsed lung    Pro[facility] )     RSV (respiratory syncytial virus infection) 10/2022    S/P carotid endarterectomy     Shortness of breath     O2 2 l/nc PRN    Sleep apnea     Sleep apnea, obstructive     Stented coronary artery        Past Surgical History:   Procedure Laterality Date    APPENDECTOMY      CARDIAC CATHETERIZATION  03/18/2021    left main with no significant disease, proximal LAD with 10% stenosis at the site of prior stent, left circumflex artery with minimal luminal irregularities mid RCA with 50% stenosis at site of prior stent and PL segment with distal disease supplied by collaterals from the distal circumflex with no significant CAD requiring revascularization at that time  CATARACT EXTRACTION, BILATERAL Bilateral     COLONOSCOPY      CORONARY ANGIOPLASTY WITH STENT PLACEMENT  1999    RCA    CORONARY ANGIOPLASTY WITH STENT PLACEMENT  2001    RCA    CORONARY ANGIOPLASTY WITH STENT PLACEMENT  2003    LAD    EYE SURGERY      IN BX PROSTATE STRTCTC SATURATION SAMPLING IMG GID N/A 09/13/2022    Procedure: TRANSPERINEAL MRI FUSION  BIOPSY PROSTATE;  Surgeon: Winston Lira MD;  [facility]: [facility];  Service: Urology    IN NEUROPLASTY &/TRANSPOS MEDIAN NRV CARPAL Benny Savers Left 07/22/2022    Procedure: RELEASE CARPAL TUNNEL;  Surgeon: Harsh Recinos MD;  Location: [facility];  Service: Orthopedics    IN NEUROPLASTY &/TRANSPOSITION ULNAR NERVE ELBOW Left 07/22/2022    Procedure: RELEASE CUBITAL TUNNEL;  Surgeon: Harsh Recinos MD;  [facility];  Service: Orthopedics    IN NEUROPLASTY &/TRANSPOSITION ULNAR NERVE WRIST Left 07/22/2022    Procedure: Etheleen Uvalde RELEASE;  Surgeon: Harsh Recinos MD;  [facility];  Service: Orthopedics    SKIN CANCER EXCISION  2012    chin-per pt, basal cell  also right ankle    TONSILLECTOMY  no        05/30/23 0924   OT Last Visit   OT Visit Date 05/30/23   Note Type   Note type Evaluation   Pain Assessment   Pain Assessment Tool 0-10   Pain Score No Pain   Restrictions/Precautions   Weight Bearing Precautions Per Order No   Other Precautions Fall Risk;Hard of hearing; Chair Alarm; Bed Alarm;Multiple lines;O2  (3 L O2 via NC, Pt wears at baseline, SuperEar amplifier utilized during session)   Home Living   Type of [address] [facility] One level;Performs ADLs on one level; Able to live on main level with bedroom/bathroom;Stairs to enter with rails  (3 SATNAM)   Bathroom Shower/Tub Walk-in shower   Bathroom Toilet Standard   [address] Walker;Cane  (pt reports use of RW at baseline)   Prior Function   Level of Gray Independent with ADLs; Independent with functional mobility; Needs assistance with IADLS   Lives With Spouse;Daughter;Family   Receives Help From Family;Home health   IADLs Independent with meal prep; Independent with medication management   Falls in the last 6 months   (+ fall history)   Vocational Retired   ADL   UB Bathing Assistance 5  Supervision/Setup   LB [address] 4  [address] 5  Supervision/Setup    [facility] 4  Minimal Assistance   Bed Mobility   Supine to Sit 5  Supervision   Additional items Assist x 1;HOB elevated; Increased time required   Additional Comments Pt remained OOB in recliner upon conclusion   Transfers   Sit to Stand 5  Supervision   Additional items Assist x 1; Increased time required   Stand to Sit 5  Supervision   Additional items Assist x 1; Increased time required   Additional Comments SpO2 during level surface amb dropped to 88% at lowest, recovered to > 90%   Balance   Static Sitting Good   Dynamic Sitting Fair +   Static Standing Fair   Dynamic Standing Fair -   Ambulatory Fair -   Activity Tolerance   Activity Tolerance Patient limited by fatigue   Nurse Made Aware LEYDA JAQUEZ Assessment   RUE Assessment WFL   LUE Assessment   LUE Assessment WFL   Cognition   Overall Cognitive Status WFL   Arousal/Participation Alert; Cooperative   Attention Attends with cues to redirect   Orientation Level Oriented X4   Memory Decreased recall of precautions   Following Commands Follows one step commands without difficulty   Assessment   Limitation Decreased ADL status; Decreased Safe judgement during ADL;Decreased endurance;Decreased self-care trans;Decreased high-level ADLs   Prognosis Good   Assessment Pt is a 77 y o  male seen for OT evaluation s/p admit to [facility] on 5/28/2023 w/ Hypokalemia  Comorbidities affecting pt's functional performance at time of assessment include: HLD, CAD, COPD, Depression, Vertigo, OA, Obesity, Dysphagia   Personal factors affecting pt at time of IE "include:steps to enter environment and difficulty performing ADLS  Prior to admission, pt was Mod I with ADLs  Upon evaluation: the following deficits impact occupational performance: decreased balance and decreased tolerance  Pt to benefit from continued skilled OT tx while in the hospital to address deficits as defined above and maximize level of functional independence w ADL's and functional mobility  Occupational Performance areas to address include: bathing/shower, toilet hygiene, dressing, functional mobility and clothing management  From OT standpoint, recommendation at time of d/c would be Home OT  Goals   Patient Goals \"to feel better\"   Plan   Treatment Interventions ADL retraining;Functional transfer training; Endurance training;Patient/family training; Compensatory technique education; Energy conservation; Activityengagement   Goal Expiration Date 06/09/23   OT Treatment Day 0   OT Frequency 3-5x/wk   Recommendation   OT Discharge Recommendation Home with home health rehabilitation   Additional Comments  Pt seen as a co-eval with PT due to the patient's co-morbidities, clinically unstable presentation, and present impairments which are a regression from the patient's baseline  Additional Comments 2 The patient's raw score on the AM-PAC Daily Activity Inpatient Short Form is 20  A raw score of greater than or equal to 19 suggests the patient may benefit from discharge to home  Please refer to the recommendation of the Occupational Therapist for safe discharge planning     AM-PAC Daily Activity Inpatient   Lower Body Dressing 3   Bathing 3   Toileting 3   Upper Body Dressing 3   Grooming 4   Eating 4   Daily Activity Raw Score 20   Daily Activity Standardized Score (Calc for Raw Score >=11) 42 03   AM-formerly [facility] Applied Cognition Inpatient   Following a Speech/Presentation 4   Understanding Ordinary Conversation 4   Taking Medications 4   Remembering Where Things Are Placed or Put Away 4   Remembering List of 4-5 " Errands 4   Taking Care of Complicated Tasks 4   Applied Cognition Raw Score 24   Applied Cognition Standardized Score 62 21     GOALS:    Pt will achieve the following within specified time frame: STG  Pt will achieve the following goals within 5 days    *ADL transfers with (I) for inc'd independence with ADLs/purposeful tasks    *UB ADL with (I) for inc'd independence with self cares    *LB ADL with CGA using AE prn for inc'd independence with self cares    *Toileting with (S) for clothing management and hygiene for return to PLOF with personal care    *Increase static stand balance to F+ and dyn stand balance to F for inc'd safety with standing purposeful tasks    *Increase stand tolerance x3 m for inc'd tolerance with standing purposeful tasks    *Participate in 10m UE therex to increase overall stamina/activity tolerance for purposeful tasks    *Bed mobility- (I) for inc'd independence to manage own comfort and initiate EOB & OOB purposeful tasks    Pt will achieve the following within specified time frame: LTG  Pt will achieve the following goals within 10 days    *LB ADL with (S) using AE prn for inc'd independence with self cares    *Toileting with (I) for clothing management and hygiene for return to PLOF with personal care    *Increase static stand balance to G- and dyn stand balance to F+ for inc'd safety with standing purposeful tasks    *Increase stand tolerance x5 m for inc'd tolerance with standing purposeful tasks      Olive Gifford MS, OTR/L

## 2023-05-30 NOTE — CONSULTS
Consultation - Nephrology   Sarah Beth Overall 77 y o  male MRN: [id number]  Unit/Bed#: -01 Encounter: [id number]      Assessment and Plan    1  Chronic diastolic heart failure  Chronically on furosemide 40 mg by mouth daily along with potassium chloride 20 mEq daily  Patient was admitted with hypokalemia potassium 2 9 mmol/L and associated acute kidney injury  Management of this patient's diuretic therapy will be challenging given his CHF, tenuous respiratory status but also poor p o  intake and risk for volume depletion  At this time, patient has some mild bilateral lower extremity edema  Ideally, I would use a weight-based dosing, but given patient's weight loss due to protein calorie malnutrition, this will not be useful  I would prefer patient to use the furosemide on an as-needed basis for worsening swelling of the legs or worsening shortness of breath  If his oral intake increases, perhaps he would benefit from furosemide every other day or another such schedule  At any rate, I do not believe this patient needs diuretics daily in his current state, but instead will require close monitoring to determine when patient will need to take the diuretic  Patient will need follow-up with his cardiologist Dr Joe Estevez within 1 week of discharge  2  Hypokalemia  We will continue patient on potassium chloride 40 mill equivalents daily  BMP within 3 days of discharge  Continue to monitor and replete for magnesium goal 2 0 mg/dL and potassium 4 0 millimole per L     3  Acute kidney injury, superimposed on chronic kidney disease  Was likely in the setting of volume depletion due to overdiuresis given that patient was continued on furosemide 40 mg daily despite drastic reduction in poor p o  intake with associated weight loss in the setting of prostate cancer  Will need to utilize loop diuretics on an as-needed basis and be responsive to patient's oral intake   Trend renal function, optimize hemodynamics, hold ACE/ARB, avoid nephrotoxins, renally dose medications, monitor volume status, monitor for urinary retention  4  Chronic kidney disease, stage II with baseline creatinine 1 0 mg/dL  5  Prostate cancer  Management per oncology  6  Chronic respiratory failure with hypoxia  / COPD on baseline oxygen 3 L via nasal cannula  Thank you for allowing us to participate in the care of your patient  Please feel free to contact us regarding the care of this patient, or any other questions/concerns that may be applicable      Patient Active Problem List   Diagnosis   • Hyperlipidemia   • Coronary artery disease involving native coronary artery of native heart without angina pectoris   • Left carotid artery occlusion   • Hypertension   • Chronic obstructive pulmonary disease with acute exacerbation ([facility])   • Oxygen dependent   • Depression, recurrent ([facility])   • S/P carotid endarterectomy   • Stented coronary artery   • Vertigo   • Anisometropia   • Osteoarthritis of spinal facet joint   • Chronic ischemic heart disease   • Chronic diastolic (congestive) heart failure ([facility])   • Diverticular disease of colon   • Dry eye syndrome   • GERD (gastroesophageal reflux disease)   • Acute hearing loss, right   • Elevated PSA   • Hypoxia   • Lens replaced by other means   • Lumbar radiculopathy   • Multinodular goiter   • Nuclear senile cataract   • Obesity   • Occlusion and stenosis of carotid artery   • Osteoarthritis of hip   • Prediabetes   • LAMBERTO on CPAP   • Solitary pulmonary nodule   • Thyroid nodule   • Guyon syndrome, left   • Costochondral chest pain   • Chest pain   • Abnormal nuclear stress test   • Right hip pain   • Primary insomnia   • Chronic right-sided thoracic back pain   • Acute renal failure superimposed on stage 3a chronic kidney disease (HCC)   • Hoarseness or changing voice   • Chronic bilateral low back pain with right-sided sciatica   • Mid back pain   • Thoracic radiculopathy   • Chronic pain syndrome   • Urinary frequency   • Incomplete bladder emptying   • Intercostal neuralgia   • Cubital tunnel syndrome on left   • Carpal tunnel syndrome on left   • Elevated MCV   • Prostate cancer (HCC)   • Chronic respiratory failure with hypoxia (HCC)   • Obstructive sleep apnea syndrome in adult   • Sensorineural hearing loss, bilateral   • RSV (respiratory syncytial virus infection)   • Right lower quadrant abdominal pain   • COVID   • Ambulatory dysfunction   • COPD (chronic obstructive pulmonary disease) ([facility])   • Hypokalemia   • Left leg pain   • Transient right leg weakness   • Pneumothorax on right   • Leukocytosis   • Orthopnea   • Lower extremity edema   • Irregular heart rhythm   • Sepsis due to pneumonia (HCC)   • Elevated troponin   • Intracranial aneurysm   • Congestive heart failure with left ventricular systolic dysfunction (LVSD) ([facility])   • Hypoxemia   • Dysphagia       History of Present Illness     Physician Requesting Consult: Tariq Tucker MD    Reason for Consult / Principal Problem: Diuretic management / Hypokalemia / CHEYANNE on CKD2    Hx and PE limited by: none    HPI: Jeremie García is a 77y o  year old male with medical history of CHF, 3 L nasal cannula at baseline, COPD and prostate cancer who was admitted to [facility] on 5/28/2023 with hypokalemia potassium 2 4 mmol/L and acute kidney injury with creatinine 1 56 mg/dL and estimated GFR 41 mL/min  Acute kidney injury is now resolved to baseline creatinine 1 09 mg/dL estimated GFR 64 mL/min with improvement in potassium to 3 5 mmol/L on 5/30/2023  Nephrology consultation is requested to assist with diuretic therapy  Patient was previously taking furosemide 40 mg daily in the outpatient setting he also took potassium chloride 20 mEq prior to presentation  Upon speaking with the patient, he denies any vomiting or diarrhea but does report poor p o  intake and a 20 pound weight loss    From a cardiac perspective, he has an ejection fraction of 40% and is followed by Dr Beba Oh  History obtained from patient and chart  Review of Systems    20 lb weight loss, poor appetite  Denies nausea, vomiting  A complete 10 point review of systems was performed and is otherwise negative  Historical Information   Past Medical History:   Diagnosis Date   • Abnormal ECG 2021 OR 2022   • Alcoholism ([facility] [address] ) 1984    sober 45 years   • Arthritis 2008    beginning   • Bilateral carotid artery stenosis    • Callus    • Cancer (HCC)     skin   • Chronic diastolic heart failure (HCC)    • Chronic ischemic heart disease    • Chronic kidney disease     stage 3   • Colon polyp    • COPD (chronic obstructive pulmonary disease) (HCC)    • Coronary artery disease     hx stents, MI, PCI   • COVID 11/2021   • CPAP (continuous positive airway pressure) dependence    • Disease of thyroid gland 2017    nodules   • Emphysema of lung ([facility] [address] ) 1995    started   • Hearing loss    • History of transfusion 1995   • Hyperlipidemia    • Hypertension    • Intracranial aneurysm 04/25/2023   • Lung nodule 2023    x rays   • [facility] 1995   • Myocardial infarction ([facility] ) 1995   • Pneumonia    • Pneumothorax 02/20/2023    collapsed lung   • Prostate cancer ([facility] )    • RSV (respiratory syncytial virus infection) 10/2022   • S/P carotid endarterectomy    • Shortness of breath     O2 2 l/nc PRN   • Sleep apnea    • Sleep apnea, obstructive    • Stented coronary artery      Past Surgical History:   Procedure Laterality Date   • APPENDECTOMY     • CARDIAC CATHETERIZATION  03/18/2021    left main with no significant disease, proximal LAD with 10% stenosis at the site of prior stent, left circumflex artery with minimal luminal irregularities mid RCA with 50% stenosis at site of prior stent and PL segment with distal disease supplied by collaterals from the distal circumflex with no significant CAD requiring revascularization at that time     • CATARACT EXTRACTION, BILATERAL Bilateral    • COLONOSCOPY     • CORONARY ANGIOPLASTY WITH STENT PLACEMENT      RCA   • CORONARY ANGIOPLASTY WITH STENT PLACEMENT      RCA   • CORONARY ANGIOPLASTY WITH STENT PLACEMENT      LAD   • EYE SURGERY     • NY BX PROSTATE STRTCTC SATURATION SAMPLING IMG GID N/A 2022    Procedure: TRANSPERINEAL MRI FUSION  BIOPSY PROSTATE;  Surgeon: Niharika Nichole MD;  Location: [facility];  Service: Urology   • NY NEUROPLASTY &/TRANSPOS MEDIAN NRV Margarita Leonarda Left 2022    Procedure: RELEASE CARPAL TUNNEL;  Surgeon: Breana Salgado MD;  Location: [facility];  Service: Orthopedics   • NY NEUROPLASTY &/TRANSPOSITION ULNAR NERVE ELBOW Left 2022    Procedure: Frutoso Eagles;  Surgeon: Breana Salgado MD;  Location: [facility];  Service: Orthopedics   • NY NEUROPLASTY &/TRANSPOSITION ULNAR NERVE WRIST Left 2022    Procedure: Wildiazena Andi;  Surgeon: Breana Salgado MD;  Location: [facility];  Service: Orthopedics   • SKIN CANCER EXCISION      chin-per pt, basal cell  also right ankle   • TONSILLECTOMY  no     Social History   Social History     Substance and Sexual Activity   Alcohol Use Not Currently     Social History     Substance and Sexual Activity   Drug Use Never     Social History     Tobacco Use   Smoking Status Former   • Packs/day: 2 00   • Years: 35 00   • Total pack years: 70 00   • Types: Cigarettes   • Start date: 1960   • Quit date: 1995   • Years since quittin 9   Smokeless Tobacco Never   Tobacco Comments    stopped smoking the day i had a heart attack     Family History   Problem Relation Age of Onset   • Heart attack Mother    • Dementia Mother    • Heart failure Mother             • Heart disease Mother         heart attacks (2)   • No Known Problems Father        Meds/Allergies   all current active meds have been reviewed, current meds:   Current Facility-Administered Medications   Medication Dose Route Frequency   • acetaminophen (TYLENOL) tablet 650 mg  650 mg Oral Q4H PRN   • aspirin chewable tablet 81 mg  81 mg Oral HS   • atorvastatin (LIPITOR) tablet 80 mg  80 mg Oral Daily With Dinner   • azithromycin (ZITHROMAX) tablet 500 mg  500 mg Oral Q24H   • famotidine (PEPCID) tablet 20 mg  20 mg Oral HS   • formoterol (PERFOROMIST) nebulizer solution 20 mcg  20 mcg Nebulization Q12H   • gabapentin (NEURONTIN) capsule 300 mg  300 mg Oral HS   • heparin (porcine) subcutaneous injection 5,000 Units  5,000 Units Subcutaneous Q8H [address]   • ipratropium (ATROVENT) 0 02 % inhalation solution 0 5 mg  0 5 mg Nebulization TID   • levalbuterol (XOPENEX) inhalation solution 1 25 mg  1 25 mg Nebulization TID   • metoprolol succinate (TOPROL-XL) 24 hr tablet 12 5 mg  12 5 mg Oral Daily   • ondansetron (ZOFRAN) injection 4 mg  4 mg Intravenous Q6H PRN   • potassium chloride (K-DUR,KLOR-CON) CR tablet 40 mEq  40 mEq Oral Daily    and PTA meds:    Medications Prior to Admission   Medication   • azithromycin (ZITHROMAX) 250 mg tablet   • ALPRAZolam (XANAX) 0 25 mg tablet   • aspirin 81 mg chewable tablet   • budesonide (PULMICORT) 0 5 mg/2 mL nebulizer solution   • dextromethorphan-guaiFENesin (ROBITUSSIN DM)  mg/5 mL syrup   • famotidine (PEPCID) 20 mg tablet   • formoterol (PERFOROMIST) 20 MCG/2ML nebulizer solution   • furosemide (LASIX) 40 mg tablet   • gabapentin (NEURONTIN) 300 mg capsule   • ipratropium-albuterol (DUO-NEB) 0 5-2 5 mg/3 mL nebulizer solution   • metoprolol succinate (TOPROL-XL) 25 mg 24 hr tablet   • oxygen gas   • potassium chloride (Klor-Con) 10 mEq tablet   • predniSONE 20 mg tablet   • rosuvastatin (CRESTOR) 40 MG tablet         Allergies   Allergen Reactions   • Lisinopril Swelling and Cough   • Tetanus Antitoxin Anaphylaxis   • Tetanus Toxoid Anaphylaxis and Swelling       Objective     Intake/Output Summary (Last 24 hours) at 5/30/2023 1351  Last data filed at 5/30/2023 1323  Gross per 24 hour Intake 600 ml   Output 778 ml   Net -178 ml       Invasive Devices:        Physical Exam  Vitals and nursing note reviewed  Constitutional:       General: He is awake  He is not in acute distress  Appearance: Normal appearance  He is well-developed and normal weight  He is not ill-appearing, toxic-appearing or diaphoretic  Interventions: Nasal cannula in place  HENT:      Head: Normocephalic and atraumatic  Jaw: There is normal jaw occlusion  Comments: Hard of hearing  Utilizing hearing device microphone headphones  Nose: Nose normal       Mouth/Throat:      Mouth: Mucous membranes are moist       Pharynx: Oropharynx is clear  No oropharyngeal exudate or posterior oropharyngeal erythema  Eyes:      General: Lids are normal  Vision grossly intact  Gaze aligned appropriately  No scleral icterus  Right eye: No discharge  Left eye: No discharge  Extraocular Movements: Extraocular movements intact  Conjunctiva/sclera: Conjunctivae normal       Pupils: Pupils are equal, round, and reactive to light  Neck:      Thyroid: No thyroid mass or thyromegaly  Trachea: Trachea and phonation normal    Cardiovascular:      Rate and Rhythm: Normal rate and regular rhythm  Heart sounds: Normal heart sounds, S1 normal and S2 normal  No murmur heard  No friction rub  No gallop  Comments: Trace to 1+ bilateral lower extremity pretibial pitting edema  Pulmonary:      Effort: Pulmonary effort is normal  No respiratory distress  Breath sounds: Normal breath sounds  No stridor  No wheezing, rhonchi or rales  Abdominal:      General: Abdomen is flat  Bowel sounds are normal  There is no distension  Palpations: Abdomen is soft  There is no mass  Tenderness: There is no abdominal tenderness  There is no guarding  Hernia: No hernia is present  Musculoskeletal:         General: Normal range of motion        Cervical back: Normal range of motion "and neck supple  No rigidity or tenderness  Right lower leg: No edema  Left lower leg: No edema  Lymphadenopathy:      Cervical: No cervical adenopathy  Skin:     General: Skin is warm and dry  Coloration: Skin is not jaundiced  Findings: No bruising  Nails: There is no clubbing  Neurological:      General: No focal deficit present  Mental Status: He is alert and oriented to person, place, and time  Mental status is at baseline  Psychiatric:         Attention and Perception: Attention and perception normal          Mood and Affect: Mood and affect normal          Speech: Speech normal          Behavior: Behavior normal  Behavior is cooperative  Thought Content: Thought content normal          Judgment: Judgment normal            I/O last 3 completed shifts: In: 500 [P O :300; IV Piggyback:200]  Out: 778 [Urine:778]    Vitals:    05/30/23 0658   BP: 104/61   Pulse: 71   Resp: 17   Temp: 97 8 °F (36 6 °C)   SpO2: 97%         Current Weight: Weight - Scale: 82 1 kg (181 lb)  First Weight: Weight - Scale: 82 1 kg (181 lb)    Lab Results:  I have personally reviewed pertinent labs      CBC: No results found for: \"ADJUSTEDWBC\", \"HCT\", \"HGB\", \"MCH\", \"MCHC\", \"MCV\", \"MPV\", \"NRBC\", \"PLT\", \"RBC\", \"RDW\", \"WBC\"  CMP:   Lab Results   Component Value Date    BUN 10 05/30/2023    CALCIUM 8 1 (L) 05/30/2023     05/30/2023    CO2 31 05/30/2023    CREATININE 1 09 05/30/2023    EGFR 64 05/30/2023    K 3 5 05/30/2023     Phosphorus: No results found for: \"PHOS\"  Magnesium:   Lab Results   Component Value Date    MG 2 1 05/30/2023     Urinalysis: No results found for: \"BILIRUBINUR\", \"BLOODU\", \"CLARITYU\", \"COLORU\", \"GLUCOSEU\", \"KETONESU\", \"LEUKOCYTESUR\", \"NITRITE\", \"PHUR\", \"PROTEINUA\", \"SPECGRAV\"  Ionized Calcium: No results found for: \"CAION\"  Coagulation: No results found for: \"APTT\", \"INR\", \"PT\"  Troponin: No results found for: \"TROPONINI\"  ABG: No results found for: \"BEART\", " "\"KPE3JAW\", \"M0ZAGVOT\", \"HGN8XXB\", \"PHART\", \"PO2ART\", \"SOURCE\"    Results from last 7 days   Lab Units 05/30/23  0609 05/29/23  0432 05/28/23  2049 05/28/23  1151 05/26/23  1555   ALK PHOS U/L  --  54  --   --  80   ALT U/L  --  15  --   --  30   AST U/L  --  17  --   --  30   BUN mg/dL 10 13 13   < > 16   CALCIUM mg/dL 8 1* 7 8* 8 2*   < > 9 0   CHLORIDE mmol/L 107 104 100   < > 102   CO2 mmol/L 31 33* 37*   < > 35*   CREATININE mg/dL 1 09 1 46* 1 55*   < > 1 61*   POTASSIUM mmol/L 3 5 3 1* 2 8*   < > 2 9*    < > = values in this interval not displayed  Radiology review:  Procedure: XR chest 1 view portable    Result Date: 5/29/2023  Narrative: CHEST INDICATION:   weakness  COMPARISON: 04/28/2023 05/01/2023 EXAM PERFORMED/VIEWS:  XR CHEST PORTABLE Images:  1 FINDINGS: Cardiomediastinal silhouette appears unremarkable  Severe emphysema  Bilateral lower lobe infiltrates, right greater than left  Findings similar to the most recent chest film  Osseous structures appear within normal limits for patient age  Impression: Bilateral lower lobe infiltrates, right greater than left  Severe emphysema  Workstation performed: [id number]     Procedure: CT head wo contrast    Result Date: 5/28/2023  Narrative: CT BRAIN - WITHOUT CONTRAST INDICATION:   WEAKNESS  COMPARISON: CT head 2/11/2023 TECHNIQUE:  CT examination of the brain was performed  Multiplanar 2D reformatted images were created from the source data  Radiation dose length product (DLP) for this visit:  847 mGy-cm   This examination, like all CT scans performed in the [facility], was performed utilizing techniques to minimize radiation dose exposure, including the use of iterative reconstruction and automated exposure control  IMAGE QUALITY:  Diagnostic  FINDINGS: PARENCHYMA:  No intracranial mass, mass effect or midline shift  No CT signs of acute infarction  No acute parenchymal hemorrhage   Periventricular and centrum semiovale white " "matter hypodensity is a nonspecific finding likely representing chronic microangiopathy  Calcification bilateral cavernous internal carotid and distal vertebral arteries  VENTRICLES AND EXTRA-AXIAL SPACES: No acute hydrocephalus  Mild prominence of CSF spaces diffusely attributed to generalized volume loss  VISUALIZED ORBITS: Changes of bilateral lens replacements noted  PARANASAL SINUSES: Small mucous retention cyst or polypoid mucosal thickening in the right maxillary and left frontal sinuses  Trace mucosal thickening bilateral ethmoid sinuses  CALVARIUM AND EXTRACRANIAL SOFT TISSUES: Stable trace opacification inferior left mastoid  Otherwise unremarkable  Impression: No evidence of acute intracranial process or significant interval change  Chronic microangiopathy  Workstation performed: [id number]         Kelsey Gaxiola PA-C      Portions of the record may have been created with voice recognition software  Occasional wrong word or \"sound a like\" substitutions may have occurred due to the inherent limitations of voice recognition software  Read the chart carefully and recognize, using context, where substitutions have occurred       "

## 2023-05-30 NOTE — CASE MANAGEMENT
Case Management Assessment & Discharge Planning Note    Patient name Eugenia Albert  Location /-74 MRN [id number]  : 1945 Date 2023       Current Admission Date: 2023  Current Admission Diagnosis:Hypokalemia   Patient Active Problem List    Diagnosis Date Noted   • Dysphagia 2023   • Congestive heart failure with left ventricular systolic dysfunction (LVSD) ([address] ) 2023   • Hypoxemia 2023   • Intracranial aneurysm 2023   • Elevated troponin 2023   • Sepsis due to pneumonia ([facility] ) 2023   • Orthopnea 2023   • Lower extremity edema 2023   • Irregular heart rhythm 2023   • Pneumothorax on right 2023   • Leukocytosis 2023   • Left leg pain 2023   • Transient right leg weakness 2023   • Hypokalemia 2023   • COVID 2023   • Ambulatory dysfunction 2023   • COPD (chronic obstructive pulmonary disease) ([facility] ) 2023   • Right lower quadrant abdominal pain 10/23/2022   • RSV (respiratory syncytial virus infection) 10/21/2022   • Obstructive sleep apnea syndrome in adult 10/19/2022   • Sensorineural hearing loss, bilateral 10/19/2022   • Chronic respiratory failure with hypoxia ([address] ) 10/15/2022   • Prostate cancer ([facility] ) 2022   • Elevated MCV 2022   • Cubital tunnel syndrome on left 2022   • Carpal tunnel syndrome on left 2022   • Intercostal neuralgia 2022   • Urinary frequency 2022   • Incomplete bladder emptying 2022   • Chronic bilateral low back pain with right-sided sciatica 2022   • Mid back pain 2022   • Thoracic radiculopathy 2022   • Chronic pain syndrome 2022   • Acute renal failure superimposed on stage 3a chronic kidney disease ([address] ) 2022   • Hoarseness or changing voice 2022   • Chronic right-sided thoracic back pain 2022   • Primary insomnia 2022   • Right hip pain 2022   • Abnormal nuclear stress test 03/18/2021   • Chest pain 02/21/2021   • Costochondral chest pain 01/15/2021   • Chronic obstructive pulmonary disease with acute exacerbation ([facility] ) 01/11/2021   • Oxygen dependent 01/11/2021   • S/P carotid endarterectomy 01/11/2021   • Stented coronary artery 01/11/2021   • Vertigo 01/11/2021   • Anisometropia 01/11/2021   • Osteoarthritis of spinal facet joint 01/11/2021   • Chronic ischemic heart disease 01/11/2021   • Chronic diastolic (congestive) heart failure ([facility] ) 01/11/2021   • Diverticular disease of colon 01/11/2021   • Dry eye syndrome 01/11/2021   • GERD (gastroesophageal reflux disease) 01/11/2021   • Acute hearing loss, right 01/11/2021   • Elevated PSA 01/11/2021   • Hypoxia 01/11/2021   • Lens replaced by other means 01/11/2021   • Lumbar radiculopathy 01/11/2021   • Multinodular goiter 01/11/2021   • Nuclear senile cataract 01/11/2021   • Obesity 01/11/2021   • Occlusion and stenosis of carotid artery 01/11/2021   • Osteoarthritis of hip 01/11/2021   • Prediabetes 01/11/2021   • LAMBERTO on CPAP 01/11/2021   • Solitary pulmonary nodule 01/11/2021   • Thyroid nodule 01/11/2021   • Guyon syndrome, 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   • Depression, recurrent ([facility] )    • Hyperlipidemia    • Coronary artery disease involving native coronary artery of native heart without angina pectoris    • Left carotid artery occlusion    • Hypertension       LOS (days): 2  Geometric Mean LOS (GMLOS) (days):   Days to GMLOS:     OBJECTIVE:    Risk of Unplanned Readmission Score: 48 62         Current admission status: Inpatient  Referral Reason: Other (Discharge planning)    Preferred Pharmacy:   [address] [place] [address] [place], [address] [place] [address]  DR JONES[address]  [address]   DR Nicole FANG [address]  Phone: [phone] Fax: [phone]    [facility] ([facility] - Samantha Mayer [address] [place] [address]  Phone: [phone] Fax: [phone]    Primary Care Provider: Ngozi Puri DO    Primary Insurance: [facility]  Secondary Insurance:     ASSESSMENT:  [address], [facility] Representative - Spouse   Primary Phone: [phone] (Mobile)               Advance Directives  Does patient have a [address]?: Yes  Does patient have Advance Directives?: Yes  Advance Directives: Power of  for health care, Living will  Primary Contact: Keisha Cross - Spouse         Readmission Root Cause  30 Day Readmission: No    Patient Information  Admitted from[de-identified] Facility  Mental Status: Alert  During Assessment patient was accompanied by: Not accompanied during assessment  Assessment information provided by[de-identified] Patient, Spouse  Primary Caregiver: Self  Support Systems: Spouse/significant other, Daughter, Family members  [place] of Residence: [address] [place] [address] Avenue do you live in?: Via [employer] entry access options   Select all that apply : Stairs  Number of steps to enter home : 3  Do the steps have railings?: Yes  Type of Current Residence:  (1 story home)  In the last 12 months, was there a time when you were not able to pay the mortgage or rent on time?: No  In the last 12 months, how many places have you lived?: 1  In the last 12 months, was there a time when you did not have a steady place to sleep or slept in a shelter (including now)?: No  Homeless/housing insecurity resource given?: N/A  Living Arrangements: Lives w/ Spouse/significant other  Is patient a ?: No    Activities of Daily Living Prior to Admission  Functional Status: Assistance  Completes ADLs independently?: No  Level of ADL dependence: Assistance  Ambulates independently?: No  Level of ambulatory dependence: Assistance  Does patient use assisted devices?: Yes  Assisted Devices (DME) used: Meir Carrasco  Does patient currently own DME?: Yes  Does patient have a history of Outpatient Therapy (PT/OT)?: Yes  Does the patient have a history of Short-Term Rehab?: Yes [facility])  Does patient have a history of HHC?: Yes  Does patient currently have [address]?: Yes ([address])    Current Home Health Care  Type of Current Home Care Services: Home PT, Nurse visit  [address][de-identified] Other (please enter name in comment) ([address])  [facility] Provider[de-identified] PCP    Patient Information Continued  Income Source: Pension/[employer]  Does patient have prescription coverage?: Yes  Within the past 12 months, you worried that your food would run out before you got the money to buy more : Never true  Within the past 12 months, the food you bought just didn't last and you didn't have money to get more : Never true  Food insecurity resource given?: N/A  Does patient receive dialysis treatments?: No  Does patient have a history of substance abuse?: No  Does patient have a history of Mental Health Diagnosis?: No         Means of Transportation  Means of Transport to Appts[de-identified] Family transport  In the past 12 months, has lack of transportation kept you from medical appointments or from getting medications?: No  In the past 12 months, has lack of transportation kept you from meetings, work, or from getting things needed for daily living?: No  Was application for public transport provided?: N/A        DISCHARGE DETAILS:    Discharge planning discussed with[de-identified] patient, spouse  Freedom of Choice: Yes     CM contacted family/caregiver?: Yes  Were Treatment Team discharge recommendations reviewed with patient/caregiver?: Yes  Did patient/caregiver verbalize understanding of patient care needs?: Yes  Were patient/caregiver advised of the risks associated with not following Treatment Team discharge recommendations?: Yes    Contacts  Patient Contacts: Harley smart - spouse  Relationship to Patient[de-identified] Family  Contact Method: Phone  Phone Number: [phone]  Reason/Outcome: Discharge [address] Is the patient interested in [address] at discharge?: Yes  [address] requested[de-identified] Nursing, Physical [address] Name[de-identified] Other ([facility])  Laith Kaylyn Grant Provider[de-identified] PCP  Home Health Services Needed[de-identified] Strengthening/Theraputic Exercises to Improve Function, Evaluate Functional Status and Safety, Gait/ADL Training  Homebound Criteria Met[de-identified] Uses an Assist Device (i e  cane, walker, etc), Requires the Assistance of Another Person for Safe Ambulation or to Leave the Home  Supporting Clincal Findings[de-identified] Limited Endurance, Fatigues Easliy in [employer]    DME Referral Provided  Referral made for DME?: No         Would you like to participate in our [address] service program?  : No - Declined    Treatment Team Recommendation: Home with [address] [facility]  Discharge Destination Plan[de-identified] Home with Codie at Discharge : Family                                      Additional Comments: [facility] recommended by therapy  Pt and pae aware and in agreement  Pt is currenlty active with [address]  They would like same to continue on discharge  Referral sent to [facility] via [employer] for REJI  CM to follow

## 2023-05-30 NOTE — PLAN OF CARE
Problem: MOBILITY - ADULT  Goal: Maintain or return to baseline ADL function  Description: INTERVENTIONS:  -  Assess patient's ability to carry out ADLs; assess patient's baseline for ADL function and identify physical deficits which impact ability to perform ADLs (bathing, care of mouth/teeth, toileting, grooming, dressing, etc )  - Assess/evaluate cause of self-care deficits   - Assess range of motion  - Assess patient's mobility; develop plan if impaired  - Assess patient's need for assistive devices and provide as appropriate  - Encourage maximum independence but intervene and supervise when necessary  - Involve family in performance of ADLs  - Assess for home care needs following discharge   - Consider OT consult to assist with ADL evaluation and planning for discharge  - Provide patient education as appropriate  Outcome: Progressing  Goal: Maintains/Returns to pre admission functional level  Description: INTERVENTIONS:  - Perform BMAT or MOVE assessment daily    - Set and communicate daily mobility goal to care team and patient/family/caregiver  - Collaborate with rehabilitation services on mobility goals if consulted  - Perform Range of Motion 3 times a day  - Reposition patient every 2 hours    - Dangle patient 3 times a day  - Stand patient 3 times a day  - Ambulate patient 3 times a day  - Out of bed to chair 3 times a day   - Out of bed for meals 3 times a day  - Out of bed for toileting  - Record patient progress and toleration of activity level   Outcome: Progressing     Problem: PAIN - ADULT  Goal: Verbalizes/displays adequate comfort level or baseline comfort level  Description: Interventions:  - Encourage patient to monitor pain and request assistance  - Assess pain using appropriate pain scale  - Administer analgesics based on type and severity of pain and evaluate response  - Implement non-pharmacological measures as appropriate and evaluate response  - Consider cultural and social influences on pain and pain management  - Notify physician/advanced practitioner if interventions unsuccessful or patient reports new pain  Outcome: Progressing     Problem: INFECTION - ADULT  Goal: Absence or prevention of progression during hospitalization  Description: INTERVENTIONS:  - Assess and monitor for signs and symptoms of infection  - Monitor lab/diagnostic results  - Monitor all insertion sites, i e  indwelling lines, tubes, and drains  - Monitor endotracheal if appropriate and nasal secretions for changes in amount and color  - [place] appropriate cooling/warming therapies per order  - Administer medications as ordered  - Instruct and encourage patient and family to use good hand hygiene technique  - Identify and instruct in appropriate isolation precautions for identified infection/condition  Outcome: Progressing  Goal: Absence of fever/infection during neutropenic period  Description: INTERVENTIONS:  - Monitor WBC    Outcome: Progressing     Problem: SAFETY ADULT  Goal: Maintain or return to baseline ADL function  Description: INTERVENTIONS:  -  Assess patient's ability to carry out ADLs; assess patient's baseline for ADL function and identify physical deficits which impact ability to perform ADLs (bathing, care of mouth/teeth, toileting, grooming, dressing, etc )  - Assess/evaluate cause of self-care deficits   - Assess range of motion  - Assess patient's mobility; develop plan if impaired  - Assess patient's need for assistive devices and provide as appropriate  - Encourage maximum independence but intervene and supervise when necessary  - Involve family in performance of ADLs  - Assess for home care needs following discharge   - Consider OT consult to assist with ADL evaluation and planning for discharge  - Provide patient education as appropriate  Outcome: Progressing  Goal: Maintains/Returns to pre admission functional level  Description: INTERVENTIONS:  - Perform BMAT or MOVE assessment daily    - Set and communicate daily mobility goal to care team and patient/family/caregiver  - Collaborate with rehabilitation services on mobility goals if consulted  - Perform Range of Motion 3 times a day  - Reposition patient every 2 hours    - Dangle patient 3 times a day  - Stand patient 3 times a day  - Ambulate patient 3 times a day  - Out of bed to chair 3 times a day   - Out of bed for meals 3 times a day  - Out of bed for toileting  - Record patient progress and toleration of activity level   Outcome: Progressing  Goal: Patient will remain free of falls  Description: INTERVENTIONS:  - Educate patient/family on patient safety including physical limitations  - Instruct patient to call for assistance with activity   - Consult OT/PT to assist with strengthening/mobility   - Keep Call bell within reach  - Keep bed low and locked with side rails adjusted as appropriate  - Keep care items and personal belongings within reach  - Initiate and maintain comfort rounds  - Make Fall Risk Sign visible to staff  - Offer Toileting every 2 Hours, in advance of need  - Initiate/Maintain bed/chair alarm  - Obtain necessary fall risk management equipment: non skid socks applied, bed in lowest and locked position, call bell within reach  - Apply yellow socks and bracelet for high fall risk patients  - Consider moving patient to room near nurses station  Outcome: Progressing     Problem: DISCHARGE PLANNING  Goal: Discharge to home or other facility with appropriate resources  Description: INTERVENTIONS:  - Identify barriers to discharge w/patient and caregiver  - Arrange for needed discharge resources and transportation as appropriate  - Identify discharge learning needs (meds, wound care, etc )  - Arrange for interpretive services to assist at discharge as needed  - Refer to Case Management Department for coordinating discharge planning if the patient needs post-hospital services based on physician/advanced practitioner order or complex needs related to functional status, cognitive ability, or social support system  Outcome: Progressing     Problem: Knowledge Deficit  Goal: Patient/family/caregiver demonstrates understanding of disease process, treatment plan, medications, and discharge instructions  Description: Complete learning assessment and assess knowledge base    Interventions:  - Provide teaching at level of understanding  - Provide teaching via preferred learning methods  Outcome: Progressing     Problem: METABOLIC, FLUID AND ELECTROLYTES - ADULT  Goal: Electrolytes maintained within normal limits  Description: INTERVENTIONS:  - Monitor labs and assess patient for signs and symptoms of electrolyte imbalances  - Administer electrolyte replacement as ordered  - Monitor response to electrolyte replacements, including repeat lab results as appropriate  - Instruct patient on fluid and nutrition as appropriate  Outcome: Progressing  Goal: Fluid balance maintained  Description: INTERVENTIONS:  - Monitor labs   - Monitor I/O and WT  - Instruct patient on fluid and nutrition as appropriate  - Assess for signs & symptoms of volume excess or deficit  Outcome: Progressing  Goal: Glucose maintained within target range  Description: INTERVENTIONS:  - Monitor Blood Glucose as ordered  - Assess for signs and symptoms of hyperglycemia and hypoglycemia  - Administer ordered medications to maintain glucose within target range  - Assess nutritional intake and initiate nutrition service referral as needed  Outcome: Progressing

## 2023-05-30 NOTE — DISCHARGE INSTR - AVS FIRST PAGE
Dear Barak Kasper,     It was our pleasure to care for you here at [facility]  It is our hope that we were always able to exceed the expected standards for your care during your stay  You were hospitalized due to low potassium and poor kidney function test   You were cared for on the 2nd floor by INOCENTE Lloyd with the [facility] who covers for your primary care physician (PCP), Sonu De Jesus DO, while you were hospitalized  If you have any questions or concerns related to this hospitalization, you may contact us at [phone]  For follow up as well as any medication refills, we recommend that you follow up with your primary care physician  A registered nurse will reach out to you by phone within a few days after your discharge to answer any additional questions that you may have after going home  However, at this time we provide for you here, the most important instructions / recommendations at discharge:     Notable Medication Adjustments -   Furosemide 40 mg daily AS NEEDED if weight gain over 3 lbs or worsening lower extremities edema or worsening shortness of breath  Continue with potassium supplement-- please take 40 meq daily for the next 3 days then obtain potassium level on 6/2/2023  Contact your PCP regarding blood work result and further direction on taking potassium supplement   Testing Required after Discharge -   BMP on 6/2/2023  Important follow up information -   Follow up with PCP and may follow up with nephrology as needed   Other Instructions -   Please refer to discharge instructions   Please review this entire after visit summary as additional general instructions including medication list, appointments, activity, diet, any pertinent wound care, and other additional recommendations from your care team that may be provided for you        Sincerely,     INOCENTE Lloyd

## 2023-05-30 NOTE — PLAN OF CARE
Problem: PHYSICAL THERAPY ADULT  Goal: Performs mobility at highest level of function for planned discharge setting  See evaluation for individualized goals  Description: Treatment/Interventions: Functional transfer training, LE strengthening/ROM, Elevations, Therapeutic exercise, Endurance training, Patient/family training, Equipment eval/education, Gait training, Bed mobility, Spoke to nursing, Spoke to case management  Equipment Recommended:  (continue RW use)       See flowsheet documentation for full assessment, interventions and recommendations  Note: Prognosis: Good  Problem List: Decreased strength, Decreased endurance, Impaired balance, Decreased mobility, Impaired judgement, Decreased safety awareness, Impaired hearing  Assessment: Pt is 77 y o  male seen for high-complexity PT evaluation on 5/30/2023 s/p admit to [facility] on 5/28/2023 w/ Hypokalemia  PT was consulted to assess pt's functional mobility and d/c needs  Order placed for PT eval and tx, w/ up and OOB as tolerated order  PTA, At baseline pt (I) ADLs/IADLs, lives c family in [address] [facility] c [address]  At time of eval, pt requires SUP for all phases of functional mobility, including amb x 24 ft with RW  Upon evaluation, pt presenting with impaired functional mobility d/t decreased strength, decreased endurance, impaired balance, decreased mobility, impaired judgement, decreased safety awareness and impaired hearing  Pertinent PMHx and current co-morbidities affecting pt's physical performance at time of assessment include: ARF superimposed on CKD stage 3a, hearing loss, prostate cancer, CHF, oxygen dependent on 3 L O2 baseline with chronic respiratory failure & hypoxia  Personal factors affecting pt at time of eval include: positive fall history, hearing impairments and increased age  The following objective measures performed on IE also reveal limitations: AM-PAC 6-Clicks: 75/86   Pt's clinical presentation is currently unstable/unpredictable seen in pt's presentation of abnormal lab value(s), need for input for task focus and mobility technique and ongoing medical assessment  Overall, pt's rehab potential and prognosis to return to PLOF is good as impacted by objective findings, warranting pt to receive further skilled PT interventions to address identified impairments, activity limitation(s), and participation restriction(s)  Pt to benefit from continued PT tx to address deficits as defined above and maximize level of functional independent mobility and consistency in order for pt to improve activity tolerance  From PT/mobility standpoint, recommendation at time of d/c would be home with home health rehabilitation pending progress in order to facilitate return to PLOF  Barriers to Discharge: None     PT Discharge Recommendation: Home with home health rehabilitation    See flowsheet documentation for full assessment

## 2023-05-30 NOTE — PHYSICAL THERAPY NOTE
Physical Therapy Evaluation   Time in: 0910  Time out: 0923  Total evaluation time: 13 minutes    Patient's Name: Cory Nick    Admitting Diagnosis  Hypokalemia [E87 6]  Weakness [R53 1]  Abnormal laboratory test [R89 9]  Acute kidney injury [facility]) [N17 9]    Problem List  Patient Active Problem List   Diagnosis    Hyperlipidemia    Coronary artery disease involving native coronary artery of native heart without angina pectoris    Left carotid artery occlusion    Hypertension    Chronic obstructive pulmonary disease with acute exacerbation ([facility])    Oxygen dependent    Depression, recurrent ([facility])    S/P carotid endarterectomy    Stented coronary artery    Vertigo    Anisometropia    Osteoarthritis of spinal facet joint    Chronic ischemic heart disease    Chronic diastolic (congestive) heart failure ([facility])    Diverticular disease of colon    Dry eye syndrome    GERD (gastroesophageal reflux disease)    Acute hearing loss, right    Elevated PSA    Hypoxia    Lens replaced by other means    Lumbar radiculopathy    Multinodular goiter    Nuclear senile cataract    Obesity    Occlusion and stenosis of carotid artery    Osteoarthritis of hip    Prediabetes    LAMBERTO on CPAP    Solitary pulmonary nodule    Thyroid nodule    Guyon syndrome, left    Costochondral chest pain    Chest pain    Abnormal nuclear stress test    Right hip pain    Primary insomnia    Chronic right-sided thoracic back pain    Acute renal failure superimposed on stage 3a chronic kidney disease (HCC)    Hoarseness or changing voice    Chronic bilateral low back pain with right-sided sciatica    Mid back pain    Thoracic radiculopathy    Chronic pain syndrome    Urinary frequency    Incomplete bladder emptying    Intercostal neuralgia    Cubital tunnel syndrome on left    Carpal tunnel syndrome on left    Elevated MCV    Prostate cancer (HCC)    Chronic respiratory failure with hypoxia (HCC)    Obstructive sleep apnea syndrome in adult    Sensorineural hearing loss, bilateral    RSV (respiratory syncytial virus infection)    Right lower quadrant abdominal pain    COVID    Ambulatory dysfunction    COPD (chronic obstructive pulmonary disease) ([facility])    Hypokalemia    Left leg pain    Transient right leg weakness    Pneumothorax on right    Leukocytosis    Orthopnea    Lower extremity edema    Irregular heart rhythm    Sepsis due to pneumonia ([facility])    Elevated troponin    Intracranial aneurysm    Congestive heart failure with left ventricular systolic dysfunction (LVSD) ([facility] )    Hypoxemia    Dysphagia       Past Medical History  Past Medical History:   Diagnosis Date    Abnormal ECG 2021 OR 2022    Alcoholism ([facility] ) 1984    sober 38 years    Arthritis 2008    beginning    Bilateral carotid artery stenosis     Callus     Cancer (HCC)     skin    Chronic diastolic heart failure (HCC)     Chronic ischemic heart disease     Chronic kidney disease     stage 3    Colon polyp     COPD (chronic obstructive pulmonary disease) ([facility])     Coronary artery disease     hx stents, MI, PCI    COVID 11/2021    CPAP (continuous positive airway pressure) dependence     Disease of thyroid gland 2017    nodules    Emphysema of lung ([facility] ) 1995    started    Hearing loss     History of transfusion 1995    Hyperlipidemia     Hypertension     Intracranial aneurysm 04/25/2023    Lung nodule 2023    x rays    MI (myocardial infarction) ([facility] ) 1995    Myocardial infarction ([facility] ) 1995    Pneumonia     Pneumothorax 02/20/2023    collapsed lung    Prostate cancer ([facility] )     RSV (respiratory syncytial virus infection) 10/2022    S/P carotid endarterectomy     Shortness of breath     O2 2 l/nc PRN    Sleep apnea     Sleep apnea, obstructive     Stented coronary artery        Past Surgical History  Past Surgical History:   Procedure Laterality Date    APPENDECTOMY      CARDIAC CATHETERIZATION  03/18/2021    left main with no significant disease, proximal LAD with 10% stenosis at the site of prior stent, left circumflex artery with minimal luminal irregularities mid RCA with 50% stenosis at site of prior stent and PL segment with distal disease supplied by collaterals from the distal circumflex with no significant CAD requiring revascularization at that time  CATARACT EXTRACTION, BILATERAL Bilateral     COLONOSCOPY      CORONARY ANGIOPLASTY WITH STENT PLACEMENT  1999    RCA    CORONARY ANGIOPLASTY WITH STENT PLACEMENT  2001    RCA    CORONARY ANGIOPLASTY WITH STENT PLACEMENT  2003    LAD    EYE SURGERY      CT BX PROSTATE STRTCTC SATURATION SAMPLING IMG GID N/A 09/13/2022    Procedure: TRANSPERINEAL MRI FUSION  BIOPSY PROSTATE;  Surgeon: Edwin Roy MD;  [facility]: [facility];  Service: Urology    CT NEUROPLASTY &/TRANSPOS MEDIAN NRV CARPAL Aleck Gentle Left 07/22/2022    Procedure: RELEASE CARPAL TUNNEL;  Surgeon: Karyn Franklin MD;  Location: [facility];  Service: Orthopedics    CT NEUROPLASTY &/TRANSPOSITION ULNAR NERVE ELBOW Left 07/22/2022    Procedure: RELEASE CUBITAL TUNNEL;  Surgeon: Karyn Franklin MD;  [facility];  Service: Orthopedics    CT NEUROPLASTY &/TRANSPOSITION ULNAR NERVE WRIST Left 07/22/2022    Procedure: Jony Rosalie RELEASE;  Surgeon: Karyn Franklin MD;  [facility];  Service: Orthopedics    SKIN CANCER EXCISION  2012    chin-per pt, basal cell  also right ankle    TONSILLECTOMY  no       PT performed at least 2 patient identifiers during session: Name and wristband  05/30/23 0923   PT Last Visit   PT Visit Date 05/30/23   Note Type   Note type Evaluation   Pain Assessment   Pain Assessment Tool 0-10   Pain Score No Pain   Restrictions/Precautions   Weight Bearing Precautions Per Order No   Other Precautions Fall Risk;Hard of hearing; Chair Alarm; Bed Alarm;Multiple lines;O2  (3 L O2 via NC)   Home Living   Type of [facility] One level;Performs ADLs on one level; Able to live on main level with bedroom/bathroom;Stairs to enter with rails  (3 [place]) "  Bathroom Shower/Tub Walk-in shower   Bathroom Toilet Standard   Bathroom Equipment Commode   Bathroom Accessibility Accessible   Home Equipment Walker;Cane  (pt reports use of RW at baseline)   Prior Function   Level of [place] Independent with ADLs; Independent with functional mobility; Needs assistance with IADLS   Lives With Spouse;Daughter;Family   Receives Help From Family;Home health   IADLs Independent with meal prep; Independent with medication management   Falls in the last 6 months   (+ fall history)   Vocational Retired   General   Family/Caregiver Present No   Cognition   Arousal/Participation Alert   Orientation Level Oriented X4   Memory Decreased recall of precautions   Following Commands Follows one step commands without difficulty   Comments pt pleasant, agreeable to PT session  Pt able to read lips and communicate with MYTRND sound amplifier headset as well with improvement in hearing for normal speaking volume   Subjective   Subjective \"I can try getting up\"   RLE Assessment   RLE Assessment WFL  (grossly 4/5 throughout)   LLE Assessment   LLE Assessment WFL  (grossly 4/5 throughout)   Coordination   Movements are Fluid and Coordinated 1   Sensation WFL   Bed Mobility   Supine to Sit 5  Supervision   Additional items Assist x 1;HOB elevated; Increased time required   Transfers   Sit to Stand 5  Supervision   Additional items Assist x 1; Increased time required   Stand to Sit 5  Supervision   Additional items Assist x 1; Increased time required   Additional Comments SpO2 during level surface amb dropped to 88% at lowest, recovered to > 90%   Ambulation/Elevation   Gait pattern Improper Weight shift;Narrow CORDELL; Forward Flexion; Shuffling; Short stride   Gait Assistance 5  Supervision   Additional items Assist x 1; Tactile cues  (cues for O2 tubing management)   Assistive Device Rolling walker   Distance 24 ft   Stair Management Assistance Not tested   Balance   Static Sitting Good   Dynamic Sitting " Fair +   Static Standing Fair   Dynamic Standing Fair -   Ambulatory Fair -   Endurance Deficit   Endurance Deficit Yes   Activity Tolerance   Activity Tolerance Patient limited by fatigue   Medical Staff Made Aware coordination of care provided with OT Olive   Nurse Made Aware Yes, RN Phu Avendano made aware of outcomes/recs   Assessment   Prognosis Good   Problem List Decreased strength;Decreased endurance; Impaired balance;Decreased mobility; Impaired judgement;Decreased safety awareness; Impaired hearing   Assessment Pt is 77 y o  male seen for high-complexity PT evaluation on 5/30/2023 s/p admit to [address] on 5/28/2023 w/ Hypokalemia  PT was consulted to assess pt's functional mobility and d/c needs  Order placed for PT eval and tx, w/ up and OOB as tolerated order  PTA, At baseline pt (I) ADLs/IADLs, lives c family in [address] [facility] c 3 [place]  At time of eval, pt requires SUP for all phases of functional mobility, including amb x 24 ft with RW  Upon evaluation, pt presenting with impaired functional mobility d/t decreased strength, decreased endurance, impaired balance, decreased mobility, impaired judgement, decreased safety awareness and impaired hearing  Pertinent PMHx and current co-morbidities affecting pt's physical performance at time of assessment include: ARF superimposed on CKD stage 3a, hearing loss, prostate cancer, CHF, oxygen dependent on 3 L O2 baseline with chronic respiratory failure & hypoxia  Personal factors affecting pt at time of eval include: positive fall history, hearing impairments and increased age  The following objective measures performed on IE also reveal limitations: AM-PAC 6-Clicks: 77/12  Pt's clinical presentation is currently unstable/unpredictable seen in pt's presentation of abnormal lab value(s), need for input for task focus and mobility technique and ongoing medical assessment   Overall, pt's rehab potential and prognosis to return to PLOF is good as impacted by objective "findings, warranting pt to receive further skilled PT interventions to address identified impairments, activity limitation(s), and participation restriction(s)  Pt to benefit from continued PT tx to address deficits as defined above and maximize level of functional independent mobility and consistency in order for pt to improve activity tolerance  From PT/mobility standpoint, recommendation at time of d/c would be home with home health rehabilitation pending progress in order to facilitate return to PLOF  Barriers to Discharge None   Goals   Patient Goals \"to feel better\"   [facility] Expiration Date 06/09/23   Short Term Goal #1 1 )Patient will complete bed mobility modified I for decrease need for caregiver assistance, decrease burden of care  2 ) Patient will complete transfers modified I to decrease risk of falls, facilitate upright standing posture  3 ) BLE strength to greater than/equal to 4/5 gross musculature to increase ability to safely transfer, control descent to chair  4 ) Patient will exhibit increase dynamic standing to Good 4-5 minutes without LOB distant supervision to improve activity tolerance  5 ) Patient will exhibit increase dynamic ambulatory balance to Fair+  feet w/AD prn distant supervision to improve ability to mobilize to toilet, chair and decrease risk for additional medical complications  6 ) Patient will exhibit good self monitoring and ability to follow 2 step commands to increase complexity of tasks and resume ADL's without LOB  PT Treatment Day 0   Plan   Treatment/Interventions Functional transfer training;LE strengthening/ROM; Elevations; Therapeutic exercise; Endurance training;Patient/family training;Equipment eval/education;Gait training;Bed mobility;Spoke to nursing;Spoke to case management   PT Frequency 3-5x/wk   Recommendation   PT Discharge Recommendation Home with home health rehabilitation   Equipment Recommended   (continue RW use)   Additional Comments Pt's raw score " on the [address]  inpatient short form is 19, standardized score is 42 48  Patients at this level are likely to benefit from DC to home with home care, however, please refer to therapist recommendation for safe DC planning  AM-PAC Basic Mobility Inpatient   Turning in Flat Bed Without Bedrails 4   Lying on Back to Sitting on Edge of Flat Bed Without Bedrails 4   Moving Bed to Chair 3   Standing Up From Chair Using Arms 3   Walk in Room 3   Climb 3-5 Stairs With Railing 2   Basic Mobility Inpatient Raw Score 19   Basic Mobility Standardized Score 42 48   Highest Level Of Mobility   JH-HLM Goal 6: Walk 10 steps or more   JH-HLM Achieved 6: Walk 10 steps or more   End of Consult   Patient Position at End of Consult Bedside chair;Bed/Chair alarm activated; All needs within reach       Jacob Christina, PT, DPT

## 2023-05-31 NOTE — UTILIZATION REVIEW
NOTIFICATION OF ADMISSION DISCHARGE   This is a Notification of Discharge from [address]  Please be advised that this patient has been discharge from our facility  Below you will find the admission and discharge date and time including the patient’s disposition  UTILIZATION REVIEW CONTACT:  Meliton Green  Utilization   Network Utilization Review Department  Phone: [phone] carefully listen to the prompts  All voicemails are confidential   Email: [email or web address]     ADMISSION INFORMATION  PRESENTATION DATE: 5/28/2023 11:37 AM  OBERVATION ADMISSION DATE:  INPATIENT ADMISSION DATE: 5/28/23  1:54 PM   DISCHARGE DATE: 5/30/2023  4:39 PM   DISPOSITION:Home with [address] INFORMATION:  Send all requests for admission clinical reviews, approved or denied determinations and any other requests to dedicated fax number below belonging to the campus where the patient is receiving treatment   List of dedicated fax numbers:  [address] DENIALS (Administrative/Medical Necessity) [phone]   [address] (Maternity/NICU/Pediatrics) [phone]   [facility] [phone]   [address] [phone]   [address] [phone]   [address] [facility] [phone] [address] [facility] [phone]   [facility] [place] [address] [phone]   [facility]  [phone]   [address] [facility] [phone]   412 [facility] [address] [facility] [phone]

## 2023-06-01 ENCOUNTER — TELEPHONE (OUTPATIENT)
Dept: FAMILY MEDICINE CLINIC | Facility: CLINIC | Age: 78
End: 2023-06-01

## 2023-06-01 ENCOUNTER — TRANSITIONAL CARE MANAGEMENT (OUTPATIENT)
Dept: FAMILY MEDICINE CLINIC | Facility: CLINIC | Age: 78
End: 2023-06-01

## 2023-06-01 NOTE — TELEPHONE ENCOUNTER
Reviewed thanks, we will see how his potassium looks tomorrow  He was taking Lasix scheduled prior, so now that it's PRN he might not need as much or might need potassium PRN  Please make a note to watch for his potassium result tomorrow- thanks!

## 2023-06-01 NOTE — TELEPHONE ENCOUNTER
Spoke with [employer]  Verbalized understanding  States he is taking lasix PRN only       Left message for speech pathology requesting call back

## 2023-06-01 NOTE — TELEPHONE ENCOUNTER
Dolores Oh, a speech pathologist, called from [address]  Is asking if we could order a modified barium swallow study or a video barium swallow study for patient  DX R13 0    [phone]  Dolores Oh would like a call back and it be informed if this is ordered

## 2023-06-01 NOTE — TELEPHONE ENCOUNTER
Plan for potassium was to continue 40 mEq daily for 3 days, and then recheck BMP  Should complete BMP tomorrow  Please make a note to check for the lab result tomorrow, I would recommend they go as early as possible so we can get result before end of day Friday/weekend  Is he taking the Lasix at all? Okay to order swallow study, which would the speech pathologist prefer?

## 2023-06-01 NOTE — TELEPHONE ENCOUNTER
Patient asking if he should continue potassium chloride   While hospitalized it was discontinued, and then on discharge he was only given three day supply

## 2023-06-02 ENCOUNTER — HOSPITAL ENCOUNTER (OUTPATIENT)
Dept: MRI IMAGING | Facility: HOSPITAL | Age: 78
Discharge: HOME | End: 2023-06-02

## 2023-06-02 ENCOUNTER — TELEPHONE (OUTPATIENT)
Dept: FAMILY MEDICINE CLINIC | Facility: CLINIC | Age: 78
End: 2023-06-02

## 2023-06-02 ENCOUNTER — APPOINTMENT (OUTPATIENT)
Dept: LAB | Facility: HOSPITAL | Age: 78
End: 2023-06-02
Payer: COMMERCIAL

## 2023-06-02 ENCOUNTER — DOCUMENTATION (OUTPATIENT)
Dept: HEMATOLOGY ONCOLOGY | Facility: CLINIC | Age: 78
End: 2023-06-02

## 2023-06-02 DIAGNOSIS — H90.3 ASYMMETRICAL SENSORINEURAL HEARING LOSS: ICD-10-CM

## 2023-06-02 DIAGNOSIS — H93.291 ABNORMAL AUDITORY PERCEPTION, RIGHT: ICD-10-CM

## 2023-06-02 DIAGNOSIS — E87.6 HYPOKALEMIA: ICD-10-CM

## 2023-06-02 DIAGNOSIS — J18.9 SEPSIS DUE TO PNEUMONIA (HCC): ICD-10-CM

## 2023-06-02 DIAGNOSIS — J96.21 ACUTE ON CHRONIC RESPIRATORY FAILURE WITH HYPOXIA (HCC): ICD-10-CM

## 2023-06-02 DIAGNOSIS — A41.9 SEPSIS DUE TO PNEUMONIA (HCC): ICD-10-CM

## 2023-06-02 DIAGNOSIS — R26.81 UNSTEADINESS ON FEET: ICD-10-CM

## 2023-06-02 DIAGNOSIS — R71.8 ELEVATED MCV: ICD-10-CM

## 2023-06-02 DIAGNOSIS — H91.21 SUDDEN HEARING LOSS, RIGHT: ICD-10-CM

## 2023-06-02 DIAGNOSIS — E87.6 HYPOKALEMIA: Primary | ICD-10-CM

## 2023-06-02 DIAGNOSIS — J44.9 CHRONIC OBSTRUCTIVE PULMONARY DISEASE, UNSPECIFIED COPD TYPE (HCC): ICD-10-CM

## 2023-06-02 DIAGNOSIS — N17.9 ACUTE KIDNEY INJURY (HCC): ICD-10-CM

## 2023-06-02 LAB
ALBUMIN SERPL BCP-MCNC: 3.3 G/DL (ref 3.5–5)
ALP SERPL-CCNC: 60 U/L (ref 34–104)
ALT SERPL W P-5'-P-CCNC: 25 U/L (ref 7–52)
ANION GAP SERPL CALCULATED.3IONS-SCNC: 7 MMOL/L (ref 4–13)
AST SERPL W P-5'-P-CCNC: 34 U/L (ref 13–39)
BILIRUB SERPL-MCNC: 0.53 MG/DL (ref 0.2–1)
BUN SERPL-MCNC: 12 MG/DL (ref 5–25)
CALCIUM ALBUM COR SERPL-MCNC: 9.1 MG/DL (ref 8.3–10.1)
CALCIUM SERPL-MCNC: 8.5 MG/DL (ref 8.4–10.2)
CHLORIDE SERPL-SCNC: 105 MMOL/L (ref 96–108)
CO2 SERPL-SCNC: 30 MMOL/L (ref 21–32)
CREAT SERPL-MCNC: 1.04 MG/DL (ref 0.6–1.3)
GFR SERPL CREATININE-BSD FRML MDRD: 67 ML/MIN/1.73SQ M
GLUCOSE P FAST SERPL-MCNC: 78 MG/DL (ref 65–99)
POTASSIUM SERPL-SCNC: 4.2 MMOL/L (ref 3.5–5.3)
PROT SERPL-MCNC: 5.3 G/DL (ref 6.4–8.4)
SODIUM SERPL-SCNC: 142 MMOL/L (ref 135–147)
VIT B12 SERPL-MCNC: 312 PG/ML (ref 180–914)

## 2023-06-02 PROCEDURE — 82607 VITAMIN B-12: CPT

## 2023-06-02 PROCEDURE — 80053 COMPREHEN METABOLIC PANEL: CPT

## 2023-06-02 PROCEDURE — 36415 COLL VENOUS BLD VENIPUNCTURE: CPT

## 2023-06-02 RX ORDER — POTASSIUM CHLORIDE 20 MEQ/1
40 TABLET, EXTENDED RELEASE ORAL DAILY PRN
Qty: 60 TABLET | Refills: 0 | Status: SHIPPED | OUTPATIENT
Start: 2023-06-02

## 2023-06-02 RX ADMIN — GADOBUTROL 8 ML: 604.72 INJECTION INTRAVENOUS at 16:06

## 2023-06-02 NOTE — PROGRESS NOTES
Chart reviewed  Patient was seen by Pulmonary on 5/15/23  Message sent to Dr Mary Kate Hernandez to see if patient is cleared to proceed with [employer] and RT for his prostate cancer  I will wait for a response and follow up in the near future

## 2023-06-05 ENCOUNTER — APPOINTMENT (OUTPATIENT)
Dept: LAB | Facility: HOSPITAL | Age: 78
End: 2023-06-05
Payer: COMMERCIAL

## 2023-06-05 DIAGNOSIS — E87.6 HYPOKALEMIA: ICD-10-CM

## 2023-06-05 LAB
ANION GAP SERPL CALCULATED.3IONS-SCNC: 5 MMOL/L (ref 4–13)
BUN SERPL-MCNC: 9 MG/DL (ref 5–25)
CALCIUM SERPL-MCNC: 8.4 MG/DL (ref 8.4–10.2)
CHLORIDE SERPL-SCNC: 103 MMOL/L (ref 96–108)
CO2 SERPL-SCNC: 33 MMOL/L (ref 21–32)
CREAT SERPL-MCNC: 0.94 MG/DL (ref 0.6–1.3)
GFR SERPL CREATININE-BSD FRML MDRD: 76 ML/MIN/1.73SQ M
GLUCOSE P FAST SERPL-MCNC: 85 MG/DL (ref 65–99)
POTASSIUM SERPL-SCNC: 3.5 MMOL/L (ref 3.5–5.3)
SODIUM SERPL-SCNC: 141 MMOL/L (ref 135–147)

## 2023-06-05 PROCEDURE — 80048 BASIC METABOLIC PNL TOTAL CA: CPT

## 2023-06-05 PROCEDURE — 36415 COLL VENOUS BLD VENIPUNCTURE: CPT

## 2023-06-06 ENCOUNTER — TELEPHONE (OUTPATIENT)
Dept: FAMILY MEDICINE CLINIC | Facility: CLINIC | Age: 78
End: 2023-06-06

## 2023-06-06 DIAGNOSIS — R13.10 DYSPHAGIA, UNSPECIFIED TYPE: Primary | ICD-10-CM

## 2023-06-06 NOTE — TELEPHONE ENCOUNTER
PT daughter called the office today    patients speech therapist called them and recommended he get a barium swallow study done  This was supposed to be sent to Dr Mode Hernandez  Are you able to place at all for patient?

## 2023-06-08 ENCOUNTER — TELEPHONE (OUTPATIENT)
Dept: UROLOGY | Facility: AMBULATORY SURGERY CENTER | Age: 78
End: 2023-06-08

## 2023-06-08 ENCOUNTER — TELEMEDICINE (OUTPATIENT)
Dept: PALLIATIVE MEDICINE | Facility: CLINIC | Age: 78
End: 2023-06-08
Payer: COMMERCIAL

## 2023-06-08 DIAGNOSIS — J44.1 CHRONIC OBSTRUCTIVE PULMONARY DISEASE WITH ACUTE EXACERBATION (HCC): ICD-10-CM

## 2023-06-08 DIAGNOSIS — C61 PROSTATE CANCER (HCC): ICD-10-CM

## 2023-06-08 DIAGNOSIS — I50.32 CHRONIC DIASTOLIC (CONGESTIVE) HEART FAILURE (HCC): Primary | Chronic | ICD-10-CM

## 2023-06-08 PROCEDURE — 99442 PR PHYS/QHP TELEPHONE EVALUATION 11-20 MIN: CPT | Performed by: FAMILY MEDICINE
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
## 2023-06-08 NOTE — Clinical Note
Dr Amita Castelan, pt would like to re-engage with your office and Rad/Onc to treat his prostate cancer  His lung health is predicted to improve, and be more stable to tolerate treatments  He or his family will be reaching out to you, BRANDAN

## 2023-06-08 NOTE — TELEPHONE ENCOUNTER
Called and left a VM for pt's daughter to call back and set up follow up in [address]       When pt calls back please place PT on next available

## 2023-06-08 NOTE — TELEPHONE ENCOUNTER
Pt under care of: Chayo     Last Seen: 9/20/22    Reason for call:    Patient and patients daughter calling in to follow up  Patient was hospitalized and had a lot of health issues going on after his last visit with Silke Ng  He currently has prostate cancer and would like to follow up as soon as possible to continue care      Pt can be reached at: [phone]    Mora Minaya- daughter)

## 2023-06-09 ENCOUNTER — TELEPHONE (OUTPATIENT)
Dept: UROLOGY | Facility: CLINIC | Age: 78
End: 2023-06-09

## 2023-06-09 PROBLEM — J18.9 SEPSIS DUE TO PNEUMONIA (HCC): Status: RESOLVED | Noted: 2023-04-03 | Resolved: 2023-06-09

## 2023-06-09 PROBLEM — A41.9 SEPSIS DUE TO PNEUMONIA (HCC): Status: RESOLVED | Noted: 2023-04-03 | Resolved: 2023-06-09

## 2023-06-09 NOTE — TELEPHONE ENCOUNTER
----- Message from Debbie Riley MD sent at 6/9/2023  7:13 AM EDT -----  Thank you Dr Lorena Wilder  It appears the patient was lost to followup with our and the radiation team with his worsening health  I have included Dr Dougie Fernando and the radiation team, who he will also need to re-establish with  Team-can we please arrange OV with me? Shira, can you begin the process of precertifying Lupron? After restaging imaging, I would like to consider ADT induction prior to SpaceOAR/Fiducials/XRT     ----- Message -----  From: Rush Hutson MD  Sent: 6/8/2023   9:29 PM EDT  To: Debbie Riley MD; [facility] , DO Dr Pulido Husbands, pt would like to re-engage with your office and Rad/Onc to treat his prostate cancer  His lung health is predicted to improve, and be more stable to tolerate treatments  He or his family will be reaching out to youBRANDAN

## 2023-06-12 ENCOUNTER — TELEPHONE (OUTPATIENT)
Dept: UROLOGY | Facility: CLINIC | Age: 78
End: 2023-06-12

## 2023-06-12 NOTE — TELEPHONE ENCOUNTER
Received incoming call from patient's daughter Mat Kirk at this time  Patient lost to follow up and needs office visit with Dr Radha Johnson for discussion of treatment for patient's prostate cancer  Patient scheduled 7/14/2023 ay 1045 at the [facility] office  Mat Kirk and patient aware of office location

## 2023-06-13 ENCOUNTER — TELEPHONE (OUTPATIENT)
Dept: FAMILY MEDICINE CLINIC | Facility: CLINIC | Age: 78
End: 2023-06-13

## 2023-06-13 NOTE — TELEPHONE ENCOUNTER
Called PT's daughter  He is due for AWV  She requested that she will call back later, she is working

## 2023-06-14 ENCOUNTER — PATIENT OUTREACH (OUTPATIENT)
Dept: HEMATOLOGY ONCOLOGY | Facility: CLINIC | Age: 78
End: 2023-06-14

## 2023-06-14 ENCOUNTER — TELEPHONE (OUTPATIENT)
Dept: UROLOGY | Facility: MEDICAL CENTER | Age: 78
End: 2023-06-14

## 2023-06-14 DIAGNOSIS — C61 PROSTATE CANCER (HCC): Primary | ICD-10-CM

## 2023-06-14 NOTE — TELEPHONE ENCOUNTER
[facility] (Auth#:  [id number]) and valid from 6/14/23 until 6/14/24  Office stock okbijal to use   *VLD 6/14/23

## 2023-06-14 NOTE — PROGRESS NOTES
Outreach to pt and spoke with his wife, Izzy Kay  Pt recently loss hearing starting a couple of months ago and is now being worked up for that issue  States pt is doing much better than earlier this year  Advised Dr Marcela Sebastian would like pt to have PSA at earliest convenience to help determine imaging plan and new baseline  [facility] also [facility] to re-establish his care  She verbalized understanding but requested call to her daughter to review

## 2023-06-14 NOTE — TELEPHONE ENCOUNTER
----- Message from Gabriel White MD sent at 6/9/2023  7:13 AM EDT -----  Thank you Dr Dino Shaikh  It appears the patient was lost to followup with our and the radiation team with his worsening health  I have included Dr Sherryle Nation and the radiation team, who he will also need to re-establish with  Team-can we please arrange OV with me? Shira, can you begin the process of precertifying Lupron? After restaging imaging, I would like to consider ADT induction prior to SpaceOAR/Fiducials/XRT     ----- Message -----  From: Kurt Medina MD  Sent: 6/8/2023   9:29 PM EDT  To: Gabriel White MD; DO Dr Nirmal Serrano, pt would like to re-engage with your office and Rad/Onc to treat his prostate cancer  His lung health is predicted to improve, and be more stable to tolerate treatments  He or his family will be reaching out to youBRANDAN

## 2023-06-15 ENCOUNTER — APPOINTMENT (OUTPATIENT)
Dept: LAB | Facility: HOSPITAL | Age: 78
End: 2023-06-15
Payer: COMMERCIAL

## 2023-06-15 ENCOUNTER — DOCUMENTATION (OUTPATIENT)
Dept: HEMATOLOGY ONCOLOGY | Facility: CLINIC | Age: 78
End: 2023-06-15

## 2023-06-15 DIAGNOSIS — C61 PROSTATE CANCER (HCC): ICD-10-CM

## 2023-06-15 LAB — PSA SERPL-MCNC: 11.23 NG/ML (ref 0–4)

## 2023-06-15 PROCEDURE — 36415 COLL VENOUS BLD VENIPUNCTURE: CPT

## 2023-06-15 PROCEDURE — 84153 ASSAY OF PSA TOTAL: CPT

## 2023-06-15 NOTE — PROGRESS NOTES
Per Jazmine Smithma with Pulmonary  Patient needs CT Chest and PFT's on 6/28/23  Patient then needs to follow up with Dr Lopez on 7/13/23 for clearance for SpaceOAR/Markers/RT  I will follow up on outcome of that appointment

## 2023-06-16 ENCOUNTER — TELEPHONE (OUTPATIENT)
Dept: PULMONOLOGY | Facility: CLINIC | Age: 78
End: 2023-06-16

## 2023-06-16 DIAGNOSIS — E87.6 HYPOKALEMIA: Primary | ICD-10-CM

## 2023-06-16 NOTE — TELEPHONE ENCOUNTER
Patients daughter left a vm asking for Esaw Hiro  She said that there may be an issue with his appointment  Please advise

## 2023-06-20 ENCOUNTER — APPOINTMENT (OUTPATIENT)
Dept: LAB | Facility: HOSPITAL | Age: 78
End: 2023-06-20
Payer: COMMERCIAL

## 2023-06-20 ENCOUNTER — DOCUMENTATION (OUTPATIENT)
Dept: HEMATOLOGY ONCOLOGY | Facility: CLINIC | Age: 78
End: 2023-06-20

## 2023-06-20 DIAGNOSIS — E87.6 HYPOKALEMIA: ICD-10-CM

## 2023-06-20 LAB
ANION GAP SERPL CALCULATED.3IONS-SCNC: 4 MMOL/L
BUN SERPL-MCNC: 11 MG/DL (ref 5–25)
CALCIUM SERPL-MCNC: 9.2 MG/DL (ref 8.4–10.2)
CHLORIDE SERPL-SCNC: 103 MMOL/L (ref 96–108)
CO2 SERPL-SCNC: 36 MMOL/L (ref 21–32)
CREAT SERPL-MCNC: 0.98 MG/DL (ref 0.6–1.3)
GFR SERPL CREATININE-BSD FRML MDRD: 73 ML/MIN/1.73SQ M
GLUCOSE P FAST SERPL-MCNC: 101 MG/DL (ref 65–99)
POTASSIUM SERPL-SCNC: 4 MMOL/L (ref 3.5–5.3)
SODIUM SERPL-SCNC: 143 MMOL/L (ref 135–147)

## 2023-06-20 PROCEDURE — 80048 BASIC METABOLIC PNL TOTAL CA: CPT

## 2023-06-20 PROCEDURE — 36415 COLL VENOUS BLD VENIPUNCTURE: CPT

## 2023-06-20 NOTE — PROGRESS NOTES
Chart reviewed  Patient has RAD ONC appt on 7/7/23  I will follow up on outcome of RAD ONC and Pulmonary appt on 7/13/23 to see if patient is cleared for SpaceOAR/Markers

## 2023-06-22 ENCOUNTER — TELEPHONE (OUTPATIENT)
Dept: SPEECH THERAPY | Facility: CLINIC | Age: 78
End: 2023-06-22

## 2023-06-22 ENCOUNTER — HOSPITAL ENCOUNTER (OUTPATIENT)
Dept: RADIOLOGY | Facility: HOSPITAL | Age: 78
Discharge: HOME | End: 2023-06-22
Attending: INTERNAL MEDICINE
Payer: COMMERCIAL

## 2023-06-22 DIAGNOSIS — R13.10 DYSPHAGIA, UNSPECIFIED TYPE: ICD-10-CM

## 2023-06-22 PROCEDURE — 74230 X-RAY XM SWLNG FUNCJ C+: CPT

## 2023-06-22 PROCEDURE — 92611 MOTION FLUOROSCOPY/SWALLOW: CPT

## 2023-06-22 NOTE — PROCEDURES
Speech Pathology Videofluoroscopic Swallow Study (VFSS/VBSS/MBSS)    Patient Name: Christin BOYER Date: 6/22/2023     Problem List  Active Problems: There are no active Hospital Problems  Past Medical History  Past Medical History:   Diagnosis Date   • Abnormal ECG 2021 OR 2022   • Alcoholism ([facility] [address] ) 1984    sober 45 years   • Arthritis 2008    beginning   • Bilateral carotid artery stenosis    • Callus    • Cancer (HCC)     skin   • Chronic diastolic heart failure (HCC)    • Chronic ischemic heart disease    • Chronic kidney disease     stage 3   • Colon polyp    • COPD (chronic obstructive pulmonary disease) (HCC)    • Coronary artery disease     hx stents, MI, PCI   • COVID 11/2021   • CPAP (continuous positive airway pressure) dependence    • Disease of thyroid gland 2017    nodules   • Emphysema of lung ([facility] [address] ) 1995    started   • Hearing loss    • History of transfusion 1995   • Hyperlipidemia    • Hypertension    • Intracranial aneurysm 04/25/2023   • Lung nodule 2023    x rays   • MI (myocardial infarction) ([facility] [address] ) 1995   • Myocardial infarction ([facility] [address] ) 1995   • Pneumonia    • Pneumothorax 02/20/2023    collapsed lung   • Prostate cancer ([facility] [address] )    • RSV (respiratory syncytial virus infection) 10/2022   • S/P carotid endarterectomy    • Shortness of breath     O2 2 l/nc PRN   • Sleep apnea    • Sleep apnea, obstructive    • Stented coronary artery      Past Surgical History  Past Surgical History:   Procedure Laterality Date   • APPENDECTOMY     • CARDIAC CATHETERIZATION  03/18/2021    left main with no significant disease, proximal LAD with 10% stenosis at the site of prior stent, left circumflex artery with minimal luminal irregularities mid RCA with 50% stenosis at site of prior stent and PL segment with distal disease supplied by collaterals from the distal circumflex with no significant CAD requiring revascularization at that time     • CATARACT EXTRACTION, BILATERAL Bilateral    • COLONOSCOPY     • CORONARY ANGIOPLASTY WITH STENT PLACEMENT  1999    RCA   • CORONARY ANGIOPLASTY WITH STENT PLACEMENT  2001    RCA   • CORONARY ANGIOPLASTY WITH STENT PLACEMENT  2003    LAD   • EYE SURGERY     • [place] BX PROSTATE STRTCTC SATURATION SAMPLING IMG GID N/A 09/13/2022    Procedure: TRANSPERINEAL MRI FUSION  BIOPSY PROSTATE;  Surgeon: Cristofer Winston MD;  Location: [facility];  Service: Urology   • W[place] NEUROPLASTY &/TRANSPOS MEDIAN NRV CARPAL [place] Left 07/22/2022    Procedure: RELEASE CARPAL TUNNEL;  Surgeon: Gonzalez Kruse MD;  Location: [facility];  Service: Orthopedics   • W[place] NEUROPLASTY &/TRANSPOSITION ULNAR NERVE ELBOW Left 07/22/2022    Procedure: RELEASE CUBITAL TUNNEL;  Surgeon: Gonzalez Kruse MD;  [facility];  Service: Orthopedics   • [place] NEUROPLASTY &/TRANSPOSITION ULNAR NERVE WRIST Left 07/22/2022    Procedure: [place] RELEASE;  Surgeon: Gonzalez Kruse MD;  Location[facility];  Service: Orthopedics   • SKIN CANCER EXCISION  2012    chin-per pt, basal cell  also right ankle   • TONSILLECTOMY  no     General Information;  Pt is a 77 y o  male with a PMH remarkable for dysphagia, pneumonia, COPD, CHF, GERD, LAMBERTO, hoarseness/change in voice, chronic respiratory failure with hypoxia, and dysphagia  Current concerns for dysphagia include several hospitalizations/recurrent pneumonia  A VFSS was recommended to assess oropharyngeal stage swallowing skills at this time and determine LRD, as patient has been consuming NTL at home following previous discharge from hospital  Pt was viewed sitting upright in the lateral and AP positions  Trials administered were consistent with MBSImP Validated Protocol: 5-mL thin liquid x2, 20-mL cup sip thin, 40-mL sequential swallow thin, 5-mL nectar thick, 20-mL cup sip nectar thick, 40-mL sequential swallow nectar thick, 5-mL Honey thick, 5-mL pudding, ½ cookie coated with 3-mL pudding, 5-mL nectar thick in the AP position, and 5-mL pudding in the AP position   Pt was also given thin liquids by straw, as well as a barium tablet with thin liquid  Oral stage:  Pt presented with minimal oral stage dysphagia  Mastication was intentionally prolonged, but grossly effective with materials administered today  Bolus formation and transfer were functional   Oral control appeared adequate with no gross premature spillage over the base of tongue  Pharyngeal stage:  Pt presented with mild pharyngeal dysphagia  Velar elevation noted  Swallowing initiation was mild-moderately delayed, with posterior spillage to vallecular and pyriform sinus  Mild-moderately reduced laryngeal rise and anterior hyoid excursion noted  Airway entrance closure appeared reduced, specifically with successive thin liquids via straw and reduced epiglottic inversion  Tongue base retraction and pharyngeal constriction appeared minimally reduced  PES opening was adequate  Management of food/liquid/barium tablet follows:   Transient laryngeal penetration occurred with thin liquids intermittently and shivani aspiration occurred with sequential thin via straw  Strategies and Efficacy: small sips minimized pharyngeal retention, secondary swallow improved removal of pharyngeal retention    Aspiration Response and Efficacy:  No cough response noted to aspiration  Esophageal stage:  Brief view of esophagus was completed after administration of the barium tablet  Esophageal phase unremarkable  Assessment Summary:    Pt presents with mild oropharyngeal dysphagia characterized by swallow initiation delay, reduced laryngeal excursion, reduced epiglottic inversion and airway closure, as well as reduced TBR and pharyngeal constriction  Use of small sips minimizes risk of asp/pen and pharyngeal retention, while use of secondary swallow improves pharyngeal clearance with large sips  Silent aspiration occurred with sequential sips of thin via straw    Note: Images are available for review in PACS as "desired  NOMS (National Outcome Measurement System): Swallowing \"Score\"    LEVEL 5: Swallowing is safe with minimal diet restriction and/or occasionally requires minimal cueing to use compensatory strategies  The individual may occasionally self-cue  All nutrition and hydration needs are met by mouth at mealtime  Recommendations:   Recommended Diet:  regular diet and thin liquids   Recommended Form of Medications: whole with liquid and whole with puree   Aspiration precautions and compensatory swallowing strategies: small bites/sips, no straws and secondary swallow  Consider referral to:  None at this time  SLP Dysphagia therapy recommended: yes for therapeutic strengthening exercises    Results Reviewed with: patient and family   Pt/Family Education: VBS findings and recommendations immediately reviewed w/ pt w/ use of images to aid in understanding  Education provided on strategies to optimize swallow safety, including the importance of oral care and s/s aspiration to monitor for and notify medical team of should they arise  Pt verbalized understanding and denied questions at this time      Patient Stated Goal: \"I guess to see if I still need that thickening agent\"    Dysphagia Goals per SLP: none at this time    "

## 2023-06-22 NOTE — TELEPHONE ENCOUNTER
Patient with difficulty understanding clinician over phone call, reporting he is Nikolski  Patient requested clinician to leave a message in his chart regarding swallowing therapy

## 2023-06-23 DIAGNOSIS — K21.9 GASTROESOPHAGEAL REFLUX DISEASE, UNSPECIFIED WHETHER ESOPHAGITIS PRESENT: Chronic | ICD-10-CM

## 2023-06-23 RX ORDER — FAMOTIDINE 20 MG/1
TABLET, FILM COATED ORAL
Qty: 100 TABLET | Refills: 2 | Status: SHIPPED | OUTPATIENT
Start: 2023-06-23

## 2023-06-27 DIAGNOSIS — J44.9 CHRONIC OBSTRUCTIVE PULMONARY DISEASE, UNSPECIFIED COPD TYPE (HCC): ICD-10-CM

## 2023-06-28 ENCOUNTER — HOSPITAL ENCOUNTER (OUTPATIENT)
Dept: PULMONOLOGY | Facility: HOSPITAL | Age: 78
Discharge: HOME | End: 2023-06-28
Attending: INTERNAL MEDICINE
Payer: COMMERCIAL

## 2023-06-28 ENCOUNTER — HOSPITAL ENCOUNTER (OUTPATIENT)
Dept: CT IMAGING | Facility: HOSPITAL | Age: 78
Discharge: HOME | End: 2023-06-28
Attending: INTERNAL MEDICINE
Payer: COMMERCIAL

## 2023-06-28 DIAGNOSIS — J44.9 CHRONIC OBSTRUCTIVE PULMONARY DISEASE, UNSPECIFIED COPD TYPE (HCC): ICD-10-CM

## 2023-06-28 DIAGNOSIS — E87.6 HYPOKALEMIA: ICD-10-CM

## 2023-06-28 DIAGNOSIS — J18.9 PNEUMONIA OF BOTH LOWER LOBES DUE TO INFECTIOUS ORGANISM: ICD-10-CM

## 2023-06-28 PROCEDURE — 94060 EVALUATION OF WHEEZING: CPT | Performed by: INTERNAL MEDICINE

## 2023-06-28 PROCEDURE — 94726 PLETHYSMOGRAPHY LUNG VOLUMES: CPT | Performed by: INTERNAL MEDICINE

## 2023-06-28 PROCEDURE — 94060 EVALUATION OF WHEEZING: CPT

## 2023-06-28 PROCEDURE — 94726 PLETHYSMOGRAPHY LUNG VOLUMES: CPT

## 2023-06-28 PROCEDURE — 94729 DIFFUSING CAPACITY: CPT | Performed by: INTERNAL MEDICINE

## 2023-06-28 PROCEDURE — 94760 N-INVAS EAR/PLS OXIMETRY 1: CPT

## 2023-06-28 PROCEDURE — 94729 DIFFUSING CAPACITY: CPT

## 2023-06-28 PROCEDURE — G1004 CDSM NDSC: HCPCS

## 2023-06-28 PROCEDURE — 71250 CT THORAX DX C-: CPT

## 2023-06-28 RX ORDER — ALBUTEROL SULFATE 2.5 MG/3ML
2.5 SOLUTION RESPIRATORY (INHALATION) ONCE AS NEEDED
Status: COMPLETED | OUTPATIENT
Start: 2023-06-28 | End: 2023-06-28

## 2023-06-28 RX ORDER — IPRATROPIUM BROMIDE AND ALBUTEROL SULFATE 2.5; .5 MG/3ML; MG/3ML
3 SOLUTION RESPIRATORY (INHALATION) 4 TIMES DAILY
Qty: 180 ML | Refills: 0 | Status: SHIPPED | OUTPATIENT
Start: 2023-06-28 | End: 2023-07-04 | Stop reason: SDUPTHER

## 2023-06-28 RX ORDER — POTASSIUM CHLORIDE 20 MEQ/1
TABLET, EXTENDED RELEASE ORAL
Qty: 60 TABLET | Refills: 0 | Status: SHIPPED | OUTPATIENT
Start: 2023-06-28

## 2023-06-28 RX ADMIN — ALBUTEROL SULFATE 2.5 MG: 2.5 SOLUTION RESPIRATORY (INHALATION) at 14:33

## 2023-06-28 NOTE — TELEPHONE ENCOUNTER
Daughter left message  Needs sent to [facility]   [facility] states they currently do not have this in stock

## 2023-06-29 DIAGNOSIS — R07.89 COSTOCHONDRAL CHEST PAIN: Primary | ICD-10-CM

## 2023-06-29 NOTE — TELEPHONE ENCOUNTER
Spoke with patients daughter, stated the pain is all over the region where his lung was previously deflated

## 2023-06-29 NOTE — TELEPHONE ENCOUNTER
Reviewed thanks, I am hesitant about Jayme Biju in his case because his lung condition makes him high risk for complications/side effects with Norco or any opioid  What has he tried for pain already?

## 2023-06-29 NOTE — TELEPHONE ENCOUNTER
Pts daughter called requesting a refill of [place] for patient taken PRN for pain  Upon chart review do not see in med history  Pharmacy [facility] on [address] in [place]

## 2023-06-30 RX ORDER — NALOXONE HYDROCHLORIDE 4 MG/.1ML
SPRAY NASAL
Qty: 1 EACH | Refills: 1 | Status: SHIPPED | OUTPATIENT
Start: 2023-06-30 | End: 2024-06-29

## 2023-06-30 RX ORDER — HYDROCODONE BITARTRATE AND ACETAMINOPHEN 5; 325 MG/1; MG/1
1 TABLET ORAL EVERY 6 HOURS PRN
Qty: 15 TABLET | Refills: 0 | Status: SHIPPED | OUTPATIENT
Start: 2023-06-30

## 2023-06-30 NOTE — TELEPHONE ENCOUNTER
Spoke with pt's wife Dwight Blevins  States he tried his muscle relaxers and did not notice much improvement, and he tried the hydrocodone he has on hand    Please advise

## 2023-06-30 NOTE — TELEPHONE ENCOUNTER
Spoke with patients wife  States pain has been ongoing  Reports he has taken Tylenol and that doesn't seem to help with the pain  Would prefer the hydrocodone-acetaminophen

## 2023-06-30 NOTE — TELEPHONE ENCOUNTER
Patients daughter called and verified medication and strength, no issues when patient has taken the medication and would like it sent to [facility] on [facility]

## 2023-06-30 NOTE — TELEPHONE ENCOUNTER
Reviewed thanks, has this pain been ongoing or is this new?  I would prefer he try Tylenol PRN if possible

## 2023-07-07 ENCOUNTER — RADIATION ONCOLOGY CONSULT (OUTPATIENT)
Dept: RADIATION ONCOLOGY | Facility: CLINIC | Age: 78
End: 2023-07-07
Attending: RADIOLOGY
Payer: COMMERCIAL

## 2023-07-07 VITALS
OXYGEN SATURATION: 94 % | BODY MASS INDEX: 29.35 KG/M2 | HEIGHT: 68 IN | SYSTOLIC BLOOD PRESSURE: 110 MMHG | TEMPERATURE: 97.8 F | DIASTOLIC BLOOD PRESSURE: 74 MMHG

## 2023-07-07 DIAGNOSIS — C61 PROSTATE CANCER (HCC): ICD-10-CM

## 2023-07-07 DIAGNOSIS — C61 PROSTATE CANCER (HCC): Primary | ICD-10-CM

## 2023-07-07 PROCEDURE — 99211 OFF/OP EST MAY X REQ PHY/QHP: CPT | Performed by: RADIOLOGY

## 2023-07-07 PROCEDURE — 99205 OFFICE O/P NEW HI 60 MIN: CPT | Performed by: RADIOLOGY

## 2023-07-07 NOTE — PROGRESS NOTES
Malia Spaulding 1945 is a 77 y.o. male History of Hemet 7 (4+3) prostate cancer. Previously seen in consult on 10/5/22. Presents today for f/u discussion of RT and re-establish care. Referred by Dr. Jaskaran Everett. Has had multiple admissions to hospital for underlying cardiac and respiratory issues since initial consult. Pulmonary clearance needed prior to spaceoar and fiducial marker placement. Will be seeing Dr. Alma Soni on 7/13/23 for clearance. Lab Results   Component Value Date    PSA 11.23 (H) 06/15/2023    PSA 12.8 (H) 06/02/2022    PSA 18.2 (H) 04/27/2022              Oncology History   Prostate cancer ([address])   9/13/2022 Initial Diagnosis    Prostate cancer ([address])     9/13/2022 Biopsy    A. Prostate, Right Anterior Medial (SUZY), Biopsy:  - Prostatic adenocarcinoma, Himanshu Score 4 + 3 = 7, Grade Group III. - Percentage Himanshu Pattern 4: 60%     - Total number of cores identified: 2     - Total number of cores with carcinoma: 2     - Percentage of tissue with carcinoma: 50%     - Linear amount of tissue with carcinoma: 15 mm total linear length in all cores     B. Prostate, Right Anterior Lateral, Biopsy:  - Prostatic adenocarcinoma, Hemet Score 3 + 3 = 6, Grade Group I.     - Total number of cores identified: 1     - Total number of cores with carcinoma: 1     - Percentage of tissue with carcinoma: 33%     - Linear amount of tissue with carcinoma: 3 mm total     C. Prostate, Left Anterior Medial, Biopsy:  - Benign prostatic tissue. D. Prostate, Left Anterior Lateral, Biopsy:  - Benign prostatic tissue. E. Prostate, Left Posterior Lateral, Biopsy:  - Prostatic adenocarcinoma, Himanshu Score 3 + 3 = 6, Grade Group I.     - Total number of cores identified: 1     - Total number of cores with carcinoma: 1     - Percentage of tissue with carcinoma: 33%     - Linear amount of tissue with carcinoma: 4 mm total  - Prostate triple stain (, p63, P504S) supports the diagnosis.   - High-grade prostatic intraepithelial neoplasia (HGPIN). F. Prostate, Left Posterior Medial, Biopsy:  - Benign prostatic tissue.  - Prostate triple stain (, p63, P504S) supports the diagnosis. G. Prostate, Left Base, Biopsy:  - Benign prostatic tissue.  - Prostate triple stain (, p63, P504S) supports the diagnosis. H. Prostate, Right Posterior Lateral, Biopsy:  - Prostatic adenocarcinoma, Himanshu Score 3 + 3 = 6, Grade Group I.     - Total number of cores identified: 1     - Total number of cores with carcinoma: 1     - Percentage of tissue with carcinoma: 10%     - Linear amount of tissue with carcinoma: 1 mm total  - High-grade prostatic intraepithelial neoplasia (HGPIN). - Prostate triple stain (, p63, P504S) supports the diagnosis. I. Prostate, Right Posterior Medial, Biopsy:  - Prostatic adenocarcinoma, Cloudcroft Score 4 + 3 = 7, Grade Group III. - Percentage Cloudcroft Pattern 4: 60%     - Total number of cores identified: 1     - Total number of cores with carcinoma: 1     - Percentage of tissue with carcinoma: 20%     - Linear amount of tissue with carcinoma: 3 mm total  - High-grade prostatic intraepithelial neoplasia (HGPIN). - Prostate triple stain (, p63, P504S) supports the diagnosis. J. Prostate, Right Base, Biopsy:  - Benign prostatic tissue.  - Prostate triple stain (, p63, P504S) supports the diagnosis. 10/5/2022 -  Cancer Staged    Staging form: Prostate, AJCC 8th Edition  - Clinical stage from 10/5/2022: Stage IIC (cT1c, cN0, cM0, PSA: 18.2, Grade Group: 3) - Signed by Michaela Pierce MD on 10/5/2022  Histopathologic type:  Adenocarcinoma, NOS  Stage prefix: Initial diagnosis  Prostate specific antigen (PSA) range: 10 to 19  Cloudcroft primary pattern: 4  Cloudcroft secondary pattern: 3  Himanshu score: 7  Histologic grading system: 5 grade system  Location of positive needle core biopsies: Both sides           Review of Systems:  Review of Systems   Constitutional: Positive for activity change. Negative for chills, fever and unexpected weight change. HENT: Positive for hearing loss (bilateral ears). Eyes:        Needs eye exam.  Reading glasses   Respiratory: Positive for cough (productive for clear yellow), shortness of breath and wheezing. Continious oxygen at 3L   Cardiovascular: Positive for palpitations and leg swelling. Negative for chest pain. Gastrointestinal: Negative. Endocrine: Positive for cold intolerance. Musculoskeletal: Negative for back pain. Chronic right-sided chest pain   Skin: Positive for wound. Allergic/Immunologic: Negative. Neurological: Positive for tremors and weakness. Negative for headaches. H/o carpal tunnel and ulnar nerve repair left arm   Hematological: Bruises/bleeds easily. Psychiatric/Behavioral: Positive for sleep disturbance. Clinical Trial: no    IPSS Questionnaire (AUA-7): Over the past month…    1)  How often have you had a sensation of not emptying your bladder completely after you finish urinating? 3 - About half the time   2)  How often have you had to urinate again less than two hours after you finished urinating? 2 - Less than half the time   3)  How often have you found you stopped and started again several times when you urinated? 1 - Less than 1 time in 5   4) How difficult have you found it to postpone urination? 2 - Less than half the time   5) How often have you had a weak urinary stream?  1 - Less than 1 time in 5   6) How often have you had to push or strain to begin urination? 1 - Less than 1 time in 5   7) How many times did you most typically get up to urinate from the time you went to bed until the time you got up in the morning?   1 - 1 time   Total Score:  11       Pain assessment: 3    PFT: completed 6/28/23    Prior Radiation: none    Teaching [facility] radiation oncology booklet given    MST completed     Implantable Devices [facility], pacemaker, pain stimulator)" none    Hip Replacement: none    Health Maintenance   Topic Date Due   • COVID-19 Vaccine (6 - Moderna series) 08/08/2022   • OT PLAN OF CARE  08/25/2022   • Medicare Annual Wellness Visit (AWV)  04/14/2023   • BMI: Followup Plan  04/15/2023   • Fall Risk  07/26/2023   • Influenza Vaccine (1) 09/01/2023   • Depression Remission PHQ  11/26/2023   • BMI: Adult  07/05/2024   • Hepatitis C Screening  Completed   • Pneumococcal Vaccine: 65+ Years  Completed   • HIB Vaccine  Aged Out   • IPV Vaccine  Aged Out   • Hepatitis A Vaccine  Aged Out   • Meningococcal ACWY Vaccine  Aged Out   • HPV Vaccine  Aged Out       Past Medical History:   Diagnosis Date   • Abnormal ECG 2021 OR 2022   • Alcoholism ([address]) 1984    sober 45 years   • Arthritis 2008    beginning   • Bilateral carotid artery stenosis    • Callus    • Cancer ([address])     skin   • Chronic diastolic heart failure ([address])    • Chronic ischemic heart disease    • Chronic kidney disease     stage 3   • Colon polyp    • COPD (chronic obstructive pulmonary disease) ([address])    • Coronary artery disease     hx stents, MI, PCI   • COVID 11/2021   • CPAP (continuous positive airway pressure) dependence    • Disease of thyroid gland 2017    nodules   • Emphysema of lung ([address]) 1995    started   • Hearing loss    • History of transfusion 1995   • Hyperlipidemia    • Hypertension    • Intracranial aneurysm 04/25/2023   • Lung nodule 2023    x rays   • MI (myocardial infarction) ([address]) 1995   • Myocardial infarction ([address]) 1995   • Pneumonia    • Pneumothorax 02/20/2023    collapsed lung   • Prostate cancer ([address])    • RSV (respiratory syncytial virus infection) 10/2022   • S/P carotid endarterectomy    • Shortness of breath     O2 2 l/nc PRN   • Sleep apnea    • Sleep apnea, obstructive    • Stented coronary artery        Past Surgical History:   Procedure Laterality Date   • APPENDECTOMY     • CARDIAC CATHETERIZATION  03/18/2021    left main with no significant disease, proximal LAD with 10% stenosis at the site of prior stent, left circumflex artery with minimal luminal irregularities mid RCA with 50% stenosis at site of prior stent and PL segment with distal disease supplied by collaterals from the distal circumflex with no significant CAD requiring revascularization at that time.    • CATARACT EXTRACTION, BILATERAL Bilateral    • COLONOSCOPY     • CORONARY ANGIOPLASTY WITH STENT PLACEMENT      RCA   • CORONARY ANGIOPLASTY WITH STENT PLACEMENT      RCA   • CORONARY ANGIOPLASTY WITH STENT PLACEMENT      LAD   • EYE SURGERY     • NH BX PROSTATE STRTCTC SATURATION SAMPLING IMG GID N/A 2022    Procedure: TRANSPERINEAL MRI FUSION  BIOPSY PROSTATE;  Surgeon: Luis Cheney MD;  Location: [facility];  Service: Urology   • NH NEUROPLASTY &/TRANSPOS MEDIAN NRV Brennen Mamadou Left 2022    Procedure: RELEASE CARPAL TUNNEL;  Surgeon: Reynold Cortez MD;  Location: [facility];  Service: Orthopedics   • NH NEUROPLASTY &/TRANSPOSITION ULNAR NERVE ELBOW Left 2022    Procedure: RELEASE CUBITAL TUNNEL;  Surgeon: Reynold Cortez MD;  Location: [facility];  Service: Orthopedics   • NH NEUROPLASTY &/TRANSPOSITION ULNAR NERVE WRIST Left 2022    Procedure: Eliza Soto;  Surgeon: Reynold Cortez MD;  Location: [facility];  Service: Orthopedics   • SKIN CANCER EXCISION      chin-per pt, basal cell  also right ankle   • TONSILLECTOMY  no       Family History   Problem Relation Age of Onset   • Heart attack Mother    • Dementia Mother    • Heart failure Mother             • Heart disease Mother         heart attacks (2)   • No Known Problems Father        Social History     Tobacco Use   • Smoking status: Former     Packs/day: 2.00     Years: 35.00     Total pack years: 70.00     Types: Cigarettes     Start date: 1960     Quit date: 1995     Years since quittin.0   • Smokeless tobacco: Never   • Tobacco comments: stopped smoking the day i had a heart attack   Vaping Use   • Vaping Use: Never used   Substance Use Topics   • Alcohol use: Not Currently   • Drug use: Never          Current Outpatient Medications:   •  ALPRAZolam (XANAX) 0.25 mg tablet, Take 1 tablet (0.25 mg total) by mouth 2 (two) times a day as needed for anxiety (Patient not taking: Reported on 7/5/2023), Disp: 15 tablet, Rfl: 0  •  aspirin 81 mg chewable tablet, 81 mg daily at bedtime, Disp: , Rfl:   •  azithromycin (ZITHROMAX) 250 mg tablet, Take 500 mg by mouth 3 (three) times a week Monday, Wednesday, Friday (Patient not taking: Reported on 6/26/2023), Disp: , Rfl:   •  budesonide (PULMICORT) 0.5 mg/2 mL nebulizer solution, Take 2 mL (0.5 mg total) by nebulization 2 (two) times a day Rinse mouth after use., Disp: 180 mL, Rfl: 5  •  dextromethorphan-guaiFENesin (ROBITUSSIN DM)  mg/5 mL syrup, Take 5 mL by mouth 3 (three) times a day as needed for cough, Disp: 354 mL, Rfl: 0  •  famotidine (PEPCID) 20 mg tablet, TAKE 1 TABLET BY MOUTH DAILY AT  BEDTIME, Disp: 100 tablet, Rfl: 2  •  formoterol (PERFOROMIST) 20 MCG/2ML nebulizer solution, Take 2 mL (20 mcg total) by nebulization 2 (two) times a day, Disp: 180 mL, Rfl: 5  •  furosemide (LASIX) 40 mg tablet, Take 1 tablet (40 mg total) by mouth daily as needed (weight gain more than 3 lbs in 24 hours or worsening lower extremities edema), Disp: 100 tablet, Rfl: 1  •  gabapentin (NEURONTIN) 300 mg capsule, Take 1 capsule (300 mg total) by mouth daily at bedtime, Disp: , Rfl:   •  HYDROcodone-acetaminophen (Norco) 5-325 mg per tablet, Take 1 tablet by mouth every 6 (six) hours as needed for pain Max Daily Amount: 4 tablets, Disp: 15 tablet, Rfl: 0  •  ipratropium-albuterol (DUO-NEB) 0.5-2.5 mg/3 mL nebulizer solution, Take 3 mL by nebulization 4 (four) times a day, Disp: 360 mL, Rfl: 5  •  metoprolol succinate (TOPROL-XL) 25 mg 24 hr tablet, Take 0.5 tablets (12.5 mg total) by mouth daily, Disp: 45 tablet, Rfl: 3  •  naloxone (NARCAN) 4 mg/0.1 mL nasal spray, Administer 1 spray into a nostril. If no response after 2-3 minutes, give another dose in the other nostril using a new spray., Disp: 1 each, Rfl: 1  •  oxygen gas, Inhale 2 L/min continuous 2LPM at rest and 3-4 LPM with activity per D Cedric FANG notes, Disp: , Rfl:   •  potassium chloride (K-DUR,KLOR-CON) 20 mEq tablet, take 2 tablets by mouth once daily if needed (TO TAKE ONLY WITH LASIX), Disp: 60 tablet, Rfl: 0  •  predniSONE 20 mg tablet, 3 tabs by mouth days 1-5, 2 tabs by mouth days 6-10, 1 tab by mouth days 11-15 (Patient not taking: Reported on 5/12/2023), Disp: 30 tablet, Rfl: 0  •  rosuvastatin (CRESTOR) 40 MG tablet, TAKE 1 TABLET DAILY (Patient taking differently: Take 40 mg by mouth daily at bedtime), Disp: 100 tablet, Rfl: 3    Allergies   Allergen Reactions   • Lisinopril Swelling and Cough   • Tetanus Antitoxin Anaphylaxis   • Tetanus Toxoid Anaphylaxis and Swelling        There were no vitals filed for this visit.

## 2023-07-07 NOTE — PROGRESS NOTES
Consultation - Radiation Oncology     JWY:[id number] : 1945  Encounter: [id number]  Patient Information: 1 Davin Oconnell  Chief Complaint   Patient presents with   • Consult     Cancer Staging   Prostate cancer [facility])  Staging form: Prostate, AJCC 8th Edition  - Clinical stage from 10/5/2022: Stage IIC (cT1c, cN0, cM0, PSA: 18.2, Grade Group: 3) - Signed by Christin Shepard MD on 10/5/2022  Histopathologic type: Adenocarcinoma, NOS  Stage prefix: Initial diagnosis  Prostate specific antigen (PSA) range: 10 to 19  Paoli primary pattern: 4  Himanshu secondary pattern: 3  Himanshu score: 7  Histologic grading system: 5 grade system  Location of positive needle core biopsies: Both sides           History of Present Illness   Dawna Patricio is a 77y.o. year old male who presents with a history of Paoli 7 (4+3) prostate cancer. Previously seen in consult on 10/5/22. Presents today for f/u discussion of RT and re-establish care. Referred by Dr. Kaykay Bello.     He has had multiple admissions to hospital for underlying cardiac and respiratory issues since initial consult 10/5/22. Pulmonary clearance needed prior to space-oar and fiducial marker placement. He will be seeing Dr. Moni Reeder on 23 .           Lab Results   Component Value Date     PSA 11.23 (H) 06/15/2023     PSA 12.8 (H) 2022     PSA 18.2 (H) 2022 PFT's  Interpretation:  • Spirometry with severe obstruction and reduced vital capacity due to air trapping  • No change with bronchodilators  • Volumes with moderate to severe air trapping  • Severely reduced diffusion capacity  Patient may be a candidate for bronchoscopic lung for reduction, based on PFT criteria    23 Chest CT wo contrast  IMPRESSION:  Overall significant improvement in the multifocal areas of consolidation since the study of May with now residual groundglass opacity present in many of these areas.  Fairly dense consolidation is still seen at the right lung base inferiorly.     There are a few new irregular areas of consolidation which are smaller. Possibilities include to include waxing and waning inflammation or recurrent aspiration as suggested previously.     Of note an older examination from  does not show the its areas of consolidation although a few calcifications were present at that time.     Continued surveillance is suggested to ensure resolution especially given the few new areas of consolidation. Patient seen today for re-consultation with his wife and son-in-law. He reports a history of COPD for many years in addition to sleep apnea. He has been on the CPAP for at least 20 years. He stopped smoking tobacco on 1995 when he had a myocardial infarction which was 28 years ago today. He has a fair urinary stream.  He has nocturia once and sometimes not at all. He denies any dysuria and also denies any hematuria. He is on home oxygen at 3L secondary to his COPD for about the last 10 years. He was previously on 2L last . He is a retired /[employer]/. He has 3 children that are all daughters who are healthy. He has 11 grandchildren and 5 great grandchildren. He has no sons. His father  from unknown causes in his 76s. His mother  at the age of 80 he thinks from cardiac disease and she also had Alzheimer's. He has no brothers. He had 1 sister who  of myocardial infarction at the age of 71. He has minimal hot flashes from [facility] that was [facility] 20[facility]. He has bilateral ankle edema. He takes Lasix daily except on the days he has doctors appointments he will skip the Lasix. He walks with a walker. He has had reduced hearing in his left ear for about 35 years. He developed new onset right ear hearing loss about 3 months ago. He has been seeing Dr. Luis Felipe Caicedo recently for right-sided transtympanic steroid injections which have not helped much.   He had unremarkable CAT scans of his temporal bones on 2023 as well as a CTA of the head neck 2023. His oxygen requirements have become worse since last fall with routine need for 3L and sometimes 4L of oxygen with exertion. He has significant more shortness of breath coming into the office today with a pulse ox of 74% despite being on 3L when he arrived. He is currently a DNR. He was having aspiration and since he started to use Thick-It this has not been occurring which has improved his respiratory status and his recent chest CT. PFTs from 2023 show he may be candidate for bronchoscopic lung for reduction/endobronchial valves. Upcomin23 ENT Dr. Kyra Xavier  23 Pulmonary Dr. Dominick Santiago  23 Urology Dr. Will Alejandro  23  PCP Dr. Jennie Ocampo  23 Cardiology Dr. Wendy Riggs   Oncology History   Prostate cancer [facility])   2022 Initial Diagnosis    Prostate cancer ([address] [facility])     2022 Biopsy    A. Prostate, Right Anterior Medial (SUZY), Biopsy:  - Prostatic adenocarcinoma, Himanshu Score 4 + 3 = 7, Grade Group III. - Percentage Randolph Pattern 4: 60%     - Total number of cores identified: 2     - Total number of cores with carcinoma: 2     - Percentage of tissue with carcinoma: 50%     - Linear amount of tissue with carcinoma: 15 mm total linear length in all cores     B. Prostate, Right Anterior Lateral, Biopsy:  - Prostatic adenocarcinoma, Randolph Score 3 + 3 = 6, Grade Group I.     - Total number of cores identified: 1     - Total number of cores with carcinoma: 1     - Percentage of tissue with carcinoma: 33%     - Linear amount of tissue with carcinoma: 3 mm total     C. Prostate, Left Anterior Medial, Biopsy:  - Benign prostatic tissue. D. Prostate, Left Anterior Lateral, Biopsy:  - Benign prostatic tissue.      E. Prostate, Left Posterior Lateral, Biopsy:  - Prostatic adenocarcinoma, Himanshu Score 3 + 3 = 6, Grade Group I.     - Total number of cores identified: 1     - Total number of cores with carcinoma: 1     - Percentage of tissue with carcinoma: 33%     - Linear amount of tissue with carcinoma: 4 mm total  - Prostate triple stain (, p63, P504S) supports the diagnosis. - High-grade prostatic intraepithelial neoplasia (HGPIN). F. Prostate, Left Posterior Medial, Biopsy:  - Benign prostatic tissue.  - Prostate triple stain (, p63, P504S) supports the diagnosis. G. Prostate, Left Base, Biopsy:  - Benign prostatic tissue.  - Prostate triple stain (, p63, P504S) supports the diagnosis. H. Prostate, Right Posterior Lateral, Biopsy:  - Prostatic adenocarcinoma, Hacksneck Score 3 + 3 = 6, Grade Group I.     - Total number of cores identified: 1     - Total number of cores with carcinoma: 1     - Percentage of tissue with carcinoma: 10%     - Linear amount of tissue with carcinoma: 1 mm total  - High-grade prostatic intraepithelial neoplasia (HGPIN). - Prostate triple stain (, p63, P504S) supports the diagnosis. I. Prostate, Right Posterior Medial, Biopsy:  - Prostatic adenocarcinoma, Hacksneck Score 4 + 3 = 7, Grade Group III. - Percentage Hacksneck Pattern 4: 60%     - Total number of cores identified: 1     - Total number of cores with carcinoma: 1     - Percentage of tissue with carcinoma: 20%     - Linear amount of tissue with carcinoma: 3 mm total  - High-grade prostatic intraepithelial neoplasia (HGPIN). - Prostate triple stain (, p63, P504S) supports the diagnosis. J. Prostate, Right Base, Biopsy:  - Benign prostatic tissue.  - Prostate triple stain (, p63, P504S) supports the diagnosis. 10/5/2022 -  Cancer Staged    Staging form: Prostate, AJCC 8th Edition  - Clinical stage from 10/5/2022: Stage IIC (cT1c, cN0, cM0, PSA: 18.2, Grade Group: 3) - Signed by Corey John MD on 10/5/2022  Histopathologic type:  Adenocarcinoma, NOS  Stage prefix: Initial diagnosis  Prostate specific antigen (PSA) range: 10 to 19  Seibert primary pattern: 4  Seibert secondary pattern: 3  Himanshu score: 7  Histologic grading system: 5 grade system  Location of positive needle core biopsies: Both sides         Past Medical History:   Diagnosis Date   • Abnormal ECG 2021 OR 2022   • Alcoholism ([address]) 1984    sober 38 years   • Arthritis 2008    beginning   • Bilateral carotid artery stenosis    • Callus    • Cancer (HCC)     skin   • Chronic diastolic heart failure (HCC)    • Chronic ischemic heart disease    • Chronic kidney disease     stage 3   • Colon polyp    • COPD (chronic obstructive pulmonary disease) (HCC)    • Coronary artery disease     hx stents, MI, PCI   • COVID 11/2021   • CPAP (continuous positive airway pressure) dependence    • Disease of thyroid gland 2017    nodules   • Emphysema of lung ([address]) 1995    started   • Hearing loss    • History of transfusion 1995   • Hyperlipidemia    • Hypertension    • Intracranial aneurysm 04/25/2023   • Lung nodule 2023    x rays   • MI (myocardial infarction) ([address]) 1995   • Myocardial infarction ([address]) 1995   • Pneumonia    • Pneumothorax 02/20/2023    collapsed lung   • Prostate cancer ([address])    • RSV (respiratory syncytial virus infection) 10/2022   • S/P carotid endarterectomy    • Shortness of breath     O2 2 l/nc PRN   • Sleep apnea    • Sleep apnea, obstructive    • Stented coronary artery      Past Surgical History:   Procedure Laterality Date   • APPENDECTOMY     • CARDIAC CATHETERIZATION  03/18/2021    left main with no significant disease, proximal LAD with 10% stenosis at the site of prior stent, left circumflex artery with minimal luminal irregularities mid RCA with 50% stenosis at site of prior stent and PL segment with distal disease supplied by collaterals from the distal circumflex with no significant CAD requiring revascularization at that time.    • CATARACT EXTRACTION, BILATERAL Bilateral    • COLONOSCOPY     • CORONARY ANGIOPLASTY WITH STENT PLACEMENT      RCA   • CORONARY ANGIOPLASTY WITH STENT PLACEMENT      RCA   • CORONARY ANGIOPLASTY WITH STENT PLACEMENT      LAD   • EYE SURGERY     • NJ BX PROSTATE STRTCTC SATURATION SAMPLING IMG GID N/A 2022    Procedure: TRANSPERINEAL MRI FUSION  BIOPSY PROSTATE;  Surgeon: Maribel Guerra MD;  Location: [facility];  Service: Urology   • NJ NEUROPLASTY &/TRANSPOS MEDIAN NRV Sky Fried Left 2022    Procedure: RELEASE CARPAL TUNNEL;  Surgeon: Zeinab Preston MD;  Location[facility];  Service: Orthopedics   • NJ NEUROPLASTY &/TRANSPOSITION ULNAR NERVE ELBOW Left 2022    Procedure: Rakesh Tafoya;  Surgeon: Zeinab Preston MD;  [facility];  Service: Orthopedics   • NJ NEUROPLASTY &/TRANSPOSITION ULNAR NERVE WRIST Left 2022    Procedure: Hailey Lubin;  Surgeon: Zeinab Preston MD;  Location: [facility];  Service: Orthopedics   • SKIN CANCER EXCISION      chin-per pt, basal cell  also right ankle   • TONSILLECTOMY  no     Family History   Problem Relation Age of Onset   • Heart attack Mother    • Dementia Mother    • Heart failure Mother             • Heart disease Mother         heart attacks (2)   • No Known Problems Father      Social History   Social History     Substance and Sexual Activity   Alcohol Use Not Currently     Social History     Substance and Sexual Activity   Drug Use Never     Social History     Tobacco Use   Smoking Status Former   • Packs/day: 2.00   • Years: 35.00   • Total pack years: 70.00   • Types: Cigarettes   • Start date: 1960   • Quit date: 1995   • Years since quittin.0   Smokeless Tobacco Never   Tobacco Comments    stopped smoking the day i had a heart attack     Meds/Allergies     Current Outpatient Medications:   •  ALPRAZolam (XANAX) 0.25 mg tablet, Take 1 tablet (0.25 mg total) by mouth 2 (two) times a day as needed for anxiety (Patient not taking: Reported on 2023), Disp: 13 tablet, Rfl: 0  •  aspirin 81 mg chewable tablet, 81 mg daily at bedtime, Disp: , Rfl:   •  azithromycin (ZITHROMAX) 250 mg tablet, Take 500 mg by mouth 3 (three) times a week Monday, Wednesday, Friday (Patient not taking: Reported on 6/26/2023), Disp: , Rfl:   •  budesonide (PULMICORT) 0.5 mg/2 mL nebulizer solution, Take 2 mL (0.5 mg total) by nebulization 2 (two) times a day Rinse mouth after use., Disp: 180 mL, Rfl: 5  •  dextromethorphan-guaiFENesin (ROBITUSSIN DM)  mg/5 mL syrup, Take 5 mL by mouth 3 (three) times a day as needed for cough, Disp: 354 mL, Rfl: 0  •  famotidine (PEPCID) 20 mg tablet, TAKE 1 TABLET BY MOUTH DAILY AT  BEDTIME, Disp: 100 tablet, Rfl: 2  •  formoterol (PERFOROMIST) 20 MCG/2ML nebulizer solution, Take 2 mL (20 mcg total) by nebulization 2 (two) times a day, Disp: 180 mL, Rfl: 5  •  furosemide (LASIX) 40 mg tablet, Take 1 tablet (40 mg total) by mouth daily as needed (weight gain more than 3 lbs in 24 hours or worsening lower extremities edema), Disp: 100 tablet, Rfl: 1  •  gabapentin (NEURONTIN) 300 mg capsule, Take 1 capsule (300 mg total) by mouth daily at bedtime, Disp: , Rfl:   •  HYDROcodone-acetaminophen (Norco) 5-325 mg per tablet, Take 1 tablet by mouth every 6 (six) hours as needed for pain Max Daily Amount: 4 tablets, Disp: 15 tablet, Rfl: 0  •  ipratropium-albuterol (DUO-NEB) 0.5-2.5 mg/3 mL nebulizer solution, Take 3 mL by nebulization 4 (four) times a day, Disp: 360 mL, Rfl: 5  •  metoprolol succinate (TOPROL-XL) 25 mg 24 hr tablet, Take 0.5 tablets (12.5 mg total) by mouth daily, Disp: 45 tablet, Rfl: 3  •  naloxone (NARCAN) 4 mg/0.1 mL nasal spray, Administer 1 spray into a nostril.  If no response after 2-3 minutes, give another dose in the other nostril using a new spray., Disp: 1 each, Rfl: 1  •  oxygen gas, Inhale 2 L/min continuous 2LPM at rest and 3-4 LPM with activity per D Cedric FANG notes, Disp: , Rfl:   •  potassium chloride (K-DUR,KLOR-CON) 20 mEq tablet, take 2 tablets by mouth once daily if needed (TO TAKE ONLY WITH LASIX), Disp: 60 tablet, Rfl: 0  •  predniSONE 20 mg tablet, 3 tabs by mouth days 1-5, 2 tabs by mouth days 6-10, 1 tab by mouth days 11-15 (Patient not taking: Reported on 5/12/2023), Disp: 30 tablet, Rfl: 0  •  rosuvastatin (CRESTOR) 40 MG tablet, TAKE 1 TABLET DAILY (Patient taking differently: Take 40 mg by mouth daily at bedtime), Disp: 100 tablet, Rfl: 3  Allergies   Allergen Reactions   • Lisinopril Swelling and Cough   • Tetanus Antitoxin Anaphylaxis   • Tetanus Toxoid Anaphylaxis and Swelling     Review of Systems   Constitutional: Positive for activity change. Negative for chills, fever and unexpected weight change. HENT: Positive for hearing loss (bilateral ears). Eyes:        Needs eye exam.  Reading glasses   Respiratory: Positive for cough (productive for clear yellow), shortness of breath and wheezing. Continious oxygen at 3L   Cardiovascular: Positive for palpitations and leg swelling. Negative for chest pain. Gastrointestinal: Negative. Endocrine: Positive for cold intolerance. Musculoskeletal: Negative for back pain. Chronic right-sided chest pain   Skin: Positive for wound. Allergic/Immunologic: Negative. Neurological: Positive for tremors and weakness. Negative for headaches. H/o carpal tunnel and ulnar nerve repair left arm   Hematological: Bruises/bleeds easily. Psychiatric/Behavioral: Positive for sleep disturbance.      Clinical Trial: no     IPSS Questionnaire (AUA-7): Over the past month…     1)  How often have you had a sensation of not emptying your bladder completely after you finish urinating? 3 - About half the time   2)  How often have you had to urinate again less than two hours after you finished urinating? 2 - Less than half the time   3)  How often have you found you stopped and started again several times when you urinated?   1 - Less than 1 time in 5   4) How difficult have you found it to postpone urination? 2 - Less than half the time   5) How often have you had a weak urinary stream?  1 - Less than 1 time in 5   6) How often have you had to push or strain to begin urination? 1 - Less than 1 time in 5   7) How many times did you most typically get up to urinate from the time you went to bed until the time you got up in the morning? 1 - 1 time   Total Score:  11      Pain assessment: 3     PFT: completed 6/28/23    OBJECTIVE:   /74 (BP Location: Left arm, Patient Position: Sitting, Cuff Size: Large)   Temp 97.8 °F (36.6 °C) (Temporal)   Ht 5' 8" (1.727 m)   SpO2 94%   BMI 29.35 kg/m²   Pain Assessment:  3  Performance Status: ECOG/Zubrod/WHO: 2 - Symptomatic, <50% confined to bed    Physical Exam  Constitutional:       General: He is not in acute distress. Appearance: He is well-developed. He is not diaphoretic. HENT:      Head: Normocephalic and atraumatic. Mouth/Throat:      Pharynx: No oropharyngeal exudate. Eyes:      General: No scleral icterus. Conjunctiva/sclera: Conjunctivae normal.      Pupils: Pupils are equal, round, and reactive to light. Neck:      Thyroid: No thyromegaly. Trachea: No tracheal deviation. Cardiovascular:      Rate and Rhythm: Normal rate and regular rhythm. Heart sounds: Normal heart sounds. Pulmonary:      Effort: Pulmonary effort is normal. No respiratory distress. Breath sounds: Normal breath sounds. No stridor. No wheezing, rhonchi or rales. Chest:      Chest wall: No tenderness. Abdominal:      General: Bowel sounds are normal. There is no distension. Palpations: Abdomen is soft. There is no mass. Tenderness: There is no abdominal tenderness. Genitourinary:     Comments: Rectal exam differed  Musculoskeletal:         General: No swelling or tenderness. Normal range of motion. Cervical back: Normal range of motion and neck supple.    Lymphadenopathy:      Cervical: No cervical adenopathy. Skin:     General: Skin is warm and dry. Coloration: Skin is not jaundiced or pale. Findings: No erythema or rash. Neurological:      Mental Status: He is alert and oriented to person, place, and time. Cranial Nerves: No cranial nerve deficit. Sensory: No sensory deficit. Motor: No weakness. Coordination: Coordination normal.   Psychiatric:         Mood and Affect: Mood normal.         Behavior: Behavior normal.         Thought Content: Thought content normal.         Judgment: Judgment normal.       RESULTS  Lab Results  Lab Results   Component Value Date    PSA 11.23 (H) 06/15/2023    PSA 12.8 (H) 06/02/2022    PSA 18.2 (H) 04/27/2022     Imaging Studies  CT chest wo contrast    Addendum Date: 7/6/2023 Addendum:   ADDENDUM: Of note, surveillance interval may be helpful in additional 2-3 months time. Bronchoscopy could also be considered given persistent consolidation, especially in the right lower lobe, which has now been present since February. Follow-up was marked in the epic system. Result Date: 7/6/2023  Narrative: CT CHEST WITHOUT IV CONTRAST INDICATION:   J18.9: Pneumonia, unspecified organism. COMPARISON: 5/1/2023; 4/3/2023; 2/28/2022; 1/15/2021 TECHNIQUE: CT examination of the chest was performed without intravenous contrast. Multiplanar 2D reformatted images were created from the source data. This examination, like all CT scans performed in the [facility], was performed utilizing techniques to minimize radiation dose exposure, including the use of iterative reconstruction and automated exposure control. Radiation dose length product (DLP) for this visit:  414.09 mGy-cm FINDINGS: LUNGS: Bullous emphysema is identified. Dependent changes on the left with some somewhat rounded atelectasis is similar to the prior study of May and improved since the study of February.  Some mixed irregular consolidative, reticular and groundglass opacity is noted in the right upper lobe with some improvement peripherally although a new small area of consolidation is seen medially, image 118, series 3. Patchy areas of consolidation are seen in the right lower lobe which is new in the superior segment on image 138, series 3. Some groundglass density is seen in the lower lobe where areas of consolidation were noted although some consolidation persists inferiorly in the right lower lobe. There is evidence of calcifications in the right lower lobe with the appearance of broncholiths similar to the prior study but increased since older exams. There is occlusion of some peripheral bronchi approaching the consolidation in the right lower lobe where is the central bronchi appear patent. PLEURA:  Unremarkable. HEART/GREAT VESSELS: Heavy atherosclerotic coronary artery calcification is noted. Heart is otherwise unremarkable. Aortic valve calcification is noted. No thoracic aortic aneurysm. MEDIASTINUM AND ZION: Small lymph nodes in the mediastinum are unchanged. CHEST WALL AND LOWER NECK: Symmetric gynecomastia. Mariya Brooms VISUALIZED STRUCTURES IN THE UPPER ABDOMEN: Tiny circumscribed hypodensities are noted in the liver without significant change. Probable small right renal cyst is noted in the right kidney. OSSEOUS STRUCTURES:  No acute fracture or destructive osseous lesion. Impression: Overall significant improvement in the multifocal areas of consolidation since the study of May with now residual groundglass opacity present in many of these areas. Fairly dense consolidation is still seen at the right lung base inferiorly. There are a few new irregular areas of consolidation which are smaller. Possibilities include to include waxing and waning inflammation or recurrent aspiration as suggested previously. Of note an older examination from 2022 does not show the its areas of consolidation although a few calcifications were present at that time.  Continued surveillance is suggested to ensure resolution especially given the few new areas of consolidation Workstation performed: [id number]     Complete PFT with post bronchodilator    Result Date: 2023  Narrative: Pulmonary Function Test Report Deshawn Isabel 77 y.o. male MRN: [id number] Date of Testin2023 Date of Interpretation: 2023 Requesting Physician: Dr. Fabio Villarreal Reason for Testing: COPD Reference set for interpretation:  Procedure: The patient was taken to pulmonary function testing laboratory. The patient demonstrated good effort and cooperation. The results of this test meet ATS standards for acceptability and repeatability. Data set appears appropriate for interpretation. Post bronchodilator testing performed after the administration of 2.5mg albuterol in 3cc normal saline administered via nebulizer per bronchodilator protocol. Results: FEV1/FVC Ratio: 44% FEV1: 1.06 L 38% predicted Forced Vital Capacity: 2.43 L 66% predicted After administration of bronchodilator: No significant change Lung volumes: Total Lung Capacity 107% predicted Residual volume 175% predicted DLCO corrected for patients hemoglobin level: 26% predicted Flow volume loop: Consistent with obstruction Interpretation: • Spirometry with severe obstruction and reduced vital capacity due to air trapping • No change with bronchodilators • Volumes with moderate to severe air trapping • Severely reduced diffusion capacity Patient may be a candidate for bronchoscopic lung for reduction, based on PFT criteria Marty Gordillo D.O., [facility]     FL barium swallow video w speech    Result Date: 2023  Narrative: A video barium swallow study was performed by the Department of Speech Pathology. Please refer to the report for the official interpretation. The images are stored for archival purposes only. Study images were not formally reviewed by the Radiology Department.     Pathology: see above    ASSESSMENT  1. Prostate cancer [facility])  Ambulatory referral to Radiation Oncology        Cancer Staging   Prostate cancer [facility])  Staging form: Prostate, AJCC 8th Edition  - Clinical stage from 10/5/2022: Stage IIC (cT1c, cN0, cM0, PSA: 18.2, Grade Group: 3) - Signed by Jose Garduno MD on 10/5/2022  Histopathologic type: Adenocarcinoma, NOS  Stage prefix: Initial diagnosis  Prostate specific antigen (PSA) range: 10 to 19  Sheridan primary pattern: 4  Sheridan secondary pattern: 3  Sheridan score: 7  Histologic grading system: 5 grade system  Location of positive needle core biopsies: Both sides      Risk Category: moderate  Bone Scan: yes - PSMA PET-CT  Androgen Deprivation Therapy: yes - 10/18/22 [place]      PLAN/DISCUSSION  No orders of the defined types were placed in this encounter. Hernesto Marks is a 77y.o. year old male with a stage IIC Sheridan score 4+3=7 prostate adenocarcinoma diagnosed after an elevated PSA of 18.2 NG/mL. He had MRI prostate which was category 4 with high probability of significant cancer being present. There was no extraprostatic tumor as well as no seminal vesicle invasion, pelvic lymphadenopathy, or pelvis osseous metastasis. He also had a PSMA PET-CT study September 30, 2022 that revealed intense activity within the prostate corresponding to the area seen on MRI consistent with malignancy. There was no metastatic disease seen within the pelvis as well as no other sites throughout the body. He was initially seen for consultation to consider definitive external beam radiation therapy on October 5, 2022. Given his age and medical problems, he is not a candidate for surgery. We recommended definitive external beam radiation therapy using intensity modulated radiation therapy as well as image guided radiation therapy last October.   We discussed placement of fiducial markers for image guided radiation therapy that will improve the accuracy of treatment and reduce side effects. We also discussed placement of SpaceOAR to reduce dose to the rectum. He has Himanshu score 4+3=7 disease and we recommended androgen deprivation therapy along with radiation therapy. He started androgen deprivation therapy with Eligard on October 18, 2022 but has not received any additional doses. We recommended a hypofractionated course of treatment over 6 weeks. We discussed acute side effects and the potential chronic complications of radiation therapy with him. Since October 2022 he has had multiple hospital admissions for his underlying cardiac and respiratory disease. He has not received medical clearance for anesthesia to have SpaceOAR and fiducial marker placement. The procedure has been scheduled twice and subsequently canceled. He returns today for reevaluation today. He has significant shortness of breath on exertion and his performance status remains poor such that he is currently not a candidate for definitive radiation therapy. He would not be able to tolerate Monday through Friday radiation therapy over 6 weeks and both he and his wife as well as son-in-law agree he is not up for this treatment given his current medical status. He had a repeat PSA level Beckie 15, 2023 that had come down to 11.23 NG/mL. This was likely even lower in April 2023 when he would have been due for his second Eligard injection. He is scheduled next week to see Dr. Shai Ivey on July 14 and will continue with androgen deprivation therapy. He had recent pulmonary function studies performed and it appears he is a candidate for bronchoscopic lung reduction/endobronchial valves. He has an appointment next week with Dr. Ney Zapien to discuss this. If he is able to undergo this procedure and has improvement in his pulmonary status, then he also needs to be considered for a cardiac valve per his report. He also has follow-up with Dr. Bobby Mina coming on July 27, 2023.   We discussed that due to his current pulmonary and cardiac issues he has elected DNR which is appropriate. He has a much higher risk of mortality from his pulmonary and cardiac disease then from prostate carcinoma at this time. This was fully discussed with him, his wife and son-in-law. We recommend he be maintained on androgen deprivation therapy. Should his performance status improve from a respiratory and cardiac standpoint, then he could be considered for definitive radiation therapy with placement of fiducial markers alone in the urology office. He could have treatment without the placement of a SpaceOAR that would require anesthesia. He will be seen here on a as needed basis. He will continue follow-ups with urology is well as pulmonary, cardiology, and his family doctor Dr. Triny Spence who he will see on July 19, 2023. Ramiro Ruiz MD  7/7/2023,1:51 PM      Portions of the record may have been created with voice recognition software.  Occasional wrong word or "sound a like" substitutions may have occurred due to the inherent limitations of voice recognition software.  Read the chart carefully and recognize, using context, where substitutions have occurred.

## 2023-07-10 ENCOUNTER — TELEPHONE (OUTPATIENT)
Dept: UROLOGY | Facility: CLINIC | Age: 78
End: 2023-07-10

## 2023-07-10 NOTE — TELEPHONE ENCOUNTER
----- Message from Ki Adams MD sent at 7/10/2023  7:27 AM EDT -----  Thank you Dr. Michelle Chung,    Team-can we make sure patient has standing appointment for ADT administration every 6months. At current, ADT is only treatment being offered and I want to make sure we keep up with injection appointments. Thanks!     Greyson  ----- Message -----  From: Annika Washington MD  Sent: 7/8/2023   7:32 PM EDT  To: Jimy Rodriguez DO; Joanie Gowers, DO; #

## 2023-07-11 DIAGNOSIS — E87.6 HYPOKALEMIA: Primary | ICD-10-CM

## 2023-07-13 ENCOUNTER — OFFICE VISIT (OUTPATIENT)
Dept: PULMONOLOGY | Facility: CLINIC | Age: 78
End: 2023-07-13
Payer: COMMERCIAL

## 2023-07-13 VITALS
DIASTOLIC BLOOD PRESSURE: 58 MMHG | BODY MASS INDEX: 28.34 KG/M2 | HEART RATE: 105 BPM | SYSTOLIC BLOOD PRESSURE: 93 MMHG | HEIGHT: 68 IN | WEIGHT: 187 LBS | TEMPERATURE: 99.5 F | OXYGEN SATURATION: 88 %

## 2023-07-13 DIAGNOSIS — J44.1 CHRONIC OBSTRUCTIVE PULMONARY DISEASE WITH ACUTE EXACERBATION (HCC): ICD-10-CM

## 2023-07-13 DIAGNOSIS — J44.9 CHRONIC OBSTRUCTIVE PULMONARY DISEASE, UNSPECIFIED COPD TYPE (HCC): Primary | ICD-10-CM

## 2023-07-13 DIAGNOSIS — J96.11 CHRONIC RESPIRATORY FAILURE WITH HYPOXIA (HCC): ICD-10-CM

## 2023-07-13 DIAGNOSIS — Z99.89 OSA ON CPAP: ICD-10-CM

## 2023-07-13 DIAGNOSIS — G47.33 OSA ON CPAP: ICD-10-CM

## 2023-07-13 PROCEDURE — 99215 OFFICE O/P EST HI 40 MIN: CPT | Performed by: INTERNAL MEDICINE

## 2023-07-13 PROCEDURE — 94618 PULMONARY STRESS TESTING: CPT | Performed by: INTERNAL MEDICINE

## 2023-07-13 NOTE — ASSESSMENT & PLAN NOTE
Betty Mayer is doing well with regards to his breathing he is maintaining on all nebulized solutions Perforomist budesonide and DuoNebs. He is inquiring about [place] valves and his PFTs make him eligible. I am concerned that he is on too much oxygen and his heart failure would preclude him. I will talk to the proceduralist and if she thinks he may be an appropriate candidate move forward with an ABG and [place] valve protocol CAT scan. I reviewed his most recent CAT scan which showed residual slowly improvin consolidations. I do not think these are related to aspiration and encouraged him to continue using his thickener which is helped with his symptoms. We will do a follow-up CAT scan in 3 months to assure complete resolution.

## 2023-07-13 NOTE — PROGRESS NOTES
Assessment/Plan:    Chronic respiratory failure with hypoxia (HCC)  Luis Miguel Mason has adequate oxygen saturations on 3 L but needs 6 L with exertion. I reviewed his 6-minute walk today and he was able to walk greater than 200 m despite his high oxygen requirements. Chronic obstructive pulmonary disease with acute exacerbation ([address])  Luis Miguel Mason is doing well with regards to his breathing he is maintaining on all nebulized solutions Perforomist budesonide and DuoNebs. He is inquiring about [place] valves and his PFTs make him eligible. I am concerned that he is on too much oxygen and his heart failure would preclude him. I will talk to the proceduralist and if she thinks he may be an appropriate candidate move forward with an ABG and [place] valve protocol CAT scan. I reviewed his most recent CAT scan which showed residual slowly improvin consolidations. I do not think these are related to aspiration and encouraged him to continue using his thickener which is helped with his symptoms. We will do a follow-up CAT scan in 3 months to assure complete resolution. Diagnoses and all orders for this visit:    Chronic obstructive pulmonary disease, unspecified COPD type ([address])  -     POCT 6 minute walk  -     CT chest without contrast; Future    LAMBERTO on CPAP    Chronic respiratory failure with hypoxia ([address])    Chronic obstructive pulmonary disease with acute exacerbation (HCC)          Subjective:      Patient ID: Renie Duverney is a 77 y.o. male. Luis Miguel Mason is here for follow-up of his COPD. He feels his breathing is better since using his thickener but still has cough and mucus production as well as shortness of breath with exertion. He tried pulmonary rehab in the past and did not feel it helped him. He is using all his nebulizers as prescribed. primary symptoms  Associated symptoms include coughing and myalgias. Pertinent negatives include no chest pain, fever, headaches or sore throat.        The following portions of the patient's history were reviewed and updated as appropriate: allergies, current medications, past family history, past medical history, past social history, past surgical history and problem list.    Review of Systems   Constitutional: Negative. Negative for appetite change and fever. HENT: Positive for postnasal drip and rhinorrhea. Negative for ear pain, sneezing, sore throat and trouble swallowing. Eyes: Negative. Respiratory: Positive for cough, shortness of breath and wheezing. Cardiovascular: Negative. Negative for chest pain. Gastrointestinal: Negative. Endocrine: Negative. Genitourinary: Negative. Musculoskeletal: Positive for myalgias. Allergic/Immunologic: Negative. Neurological: Negative. Negative for headaches. Hematological: Negative. Psychiatric/Behavioral: Negative. Objective:      BP 93/58   Pulse 105   Temp 99.5 °F (37.5 °C)   Ht 5' 8" (1.727 m)   Wt 84.8 kg (187 lb)   SpO2 (!) 88% Comment: 3L  BMI 28.43 kg/m²          Physical Exam  Constitutional:       Appearance: He is well-developed. HENT:      Head: Normocephalic. Eyes:      Pupils: Pupils are equal, round, and reactive to light. Cardiovascular:      Rate and Rhythm: Normal rate. Pulmonary:      Effort: Pulmonary effort is normal. No respiratory distress. Breath sounds: No wheezing or rales. Abdominal:      Palpations: Abdomen is soft. Musculoskeletal:         General: Normal range of motion. Cervical back: Neck supple. Skin:     General: Skin is warm and dry. Neurological:      Mental Status: He is alert and oriented to person, place, and time.          Answers for HPI/ROS submitted by the patient on 7/6/2023  Do you have chest tightness?: Yes  Do you have difficulty breathing?: Yes  Chronicity: recurrent  When did you first notice your symptoms?: more than 1 year ago  How often do your symptoms occur?: daily  Since you first noticed this problem, how has it changed?: gradually worsening  Do you have shortness of breath that occurs with effort or exertion?: Yes  Do you have ear congestion?: Yes  Do you have heartburn?: No  Do you have fatigue?: No  Do you have nasal congestion?: No  Do you have shortness of breath when lying flat?: Yes  Do you have shortness of breath when you wake up?: No  Do you have sweats?: No  Have you experienced weight loss?: Yes  Which of the following makes your symptoms worse?: any activity, climbing stairs, exposure to smoke, minimal activity, strenuous activity, URI  Which of the following makes your symptoms better?: change in position, diuretics, ipratropium, rest, steroid inhaler  Risk factors for lung disease: smoking/tobacco exposure

## 2023-07-13 NOTE — ASSESSMENT & PLAN NOTE
Efra Obrien has adequate oxygen saturations on 3 L but needs 6 L with exertion. I reviewed his 6-minute walk today and he was able to walk greater than 200 m despite his high oxygen requirements.

## 2023-07-14 ENCOUNTER — OFFICE VISIT (OUTPATIENT)
Dept: UROLOGY | Facility: CLINIC | Age: 78
End: 2023-07-14
Payer: COMMERCIAL

## 2023-07-14 VITALS
SYSTOLIC BLOOD PRESSURE: 100 MMHG | HEART RATE: 68 BPM | DIASTOLIC BLOOD PRESSURE: 60 MMHG | WEIGHT: 187 LBS | OXYGEN SATURATION: 96 % | BODY MASS INDEX: 28.34 KG/M2 | HEIGHT: 68 IN

## 2023-07-14 DIAGNOSIS — C61 PROSTATE CANCER (HCC): Primary | ICD-10-CM

## 2023-07-14 PROCEDURE — 99214 OFFICE O/P EST MOD 30 MIN: CPT | Performed by: PHYSICIAN ASSISTANT

## 2023-07-14 PROCEDURE — 96402 CHEMO HORMON ANTINEOPL SQ/IM: CPT

## 2023-07-18 DIAGNOSIS — R07.9 CHEST PAIN, UNSPECIFIED TYPE: ICD-10-CM

## 2023-07-18 DIAGNOSIS — J44.9 CHRONIC OBSTRUCTIVE PULMONARY DISEASE, UNSPECIFIED COPD TYPE (HCC): ICD-10-CM

## 2023-07-18 DIAGNOSIS — I25.9 CHRONIC ISCHEMIC HEART DISEASE: ICD-10-CM

## 2023-07-19 ENCOUNTER — PATIENT MESSAGE (OUTPATIENT)
Dept: FAMILY MEDICINE CLINIC | Facility: CLINIC | Age: 78
End: 2023-07-19

## 2023-07-19 ENCOUNTER — HOSPITAL ENCOUNTER (EMERGENCY)
Facility: HOSPITAL | Age: 78
Discharge: HOME | End: 2023-07-19
Attending: EMERGENCY MEDICINE
Payer: COMMERCIAL

## 2023-07-19 ENCOUNTER — DOCUMENTATION (OUTPATIENT)
Dept: HEMATOLOGY ONCOLOGY | Facility: CLINIC | Age: 78
End: 2023-07-19

## 2023-07-19 ENCOUNTER — OFFICE VISIT (OUTPATIENT)
Dept: FAMILY MEDICINE CLINIC | Facility: CLINIC | Age: 78
End: 2023-07-19
Payer: COMMERCIAL

## 2023-07-19 VITALS
OXYGEN SATURATION: 97 % | RESPIRATION RATE: 18 BRPM | HEIGHT: 68 IN | HEART RATE: 96 BPM | TEMPERATURE: 98.4 F | BODY MASS INDEX: 29.86 KG/M2 | WEIGHT: 197 LBS | DIASTOLIC BLOOD PRESSURE: 71 MMHG | SYSTOLIC BLOOD PRESSURE: 119 MMHG

## 2023-07-19 VITALS
OXYGEN SATURATION: 93 % | HEIGHT: 68 IN | DIASTOLIC BLOOD PRESSURE: 68 MMHG | SYSTOLIC BLOOD PRESSURE: 118 MMHG | TEMPERATURE: 96.5 F | BODY MASS INDEX: 29.67 KG/M2 | RESPIRATION RATE: 18 BRPM | WEIGHT: 195.8 LBS | HEART RATE: 92 BPM

## 2023-07-19 DIAGNOSIS — G89.29 CHRONIC RIGHT-SIDED THORACIC BACK PAIN: ICD-10-CM

## 2023-07-19 DIAGNOSIS — J96.11 CHRONIC RESPIRATORY FAILURE WITH HYPOXIA (HCC): ICD-10-CM

## 2023-07-19 DIAGNOSIS — M54.6 CHRONIC RIGHT-SIDED THORACIC BACK PAIN: ICD-10-CM

## 2023-07-19 DIAGNOSIS — R26.2 AMBULATORY DYSFUNCTION: ICD-10-CM

## 2023-07-19 DIAGNOSIS — H91.90 HARD OF HEARING: Primary | ICD-10-CM

## 2023-07-19 DIAGNOSIS — R60.0 EDEMA OF BOTH LOWER EXTREMITIES: ICD-10-CM

## 2023-07-19 DIAGNOSIS — J44.9 CHRONIC OBSTRUCTIVE PULMONARY DISEASE, UNSPECIFIED COPD TYPE (HCC): ICD-10-CM

## 2023-07-19 DIAGNOSIS — Z00.00 MEDICARE ANNUAL WELLNESS VISIT, SUBSEQUENT: Primary | ICD-10-CM

## 2023-07-19 DIAGNOSIS — R00.0 TACHYCARDIA: ICD-10-CM

## 2023-07-19 DIAGNOSIS — R05.9 COUGH, UNSPECIFIED TYPE: Primary | ICD-10-CM

## 2023-07-19 DIAGNOSIS — I50.9 CHRONIC HEART FAILURE (HCC): ICD-10-CM

## 2023-07-19 LAB
2HR DELTA HS TROPONIN: 0 NG/L
ALBUMIN SERPL BCP-MCNC: 3.6 G/DL (ref 3.5–5)
ALP SERPL-CCNC: 55 U/L (ref 34–104)
ALT SERPL W P-5'-P-CCNC: 17 U/L (ref 7–52)
ANION GAP SERPL CALCULATED.3IONS-SCNC: 6 MMOL/L
AST SERPL W P-5'-P-CCNC: 19 U/L (ref 13–39)
BASOPHILS # BLD AUTO: 0.04 THOUSANDS/ÂΜL (ref 0–0.1)
BASOPHILS NFR BLD AUTO: 0 % (ref 0–1)
BILIRUB SERPL-MCNC: 0.45 MG/DL (ref 0.2–1)
BNP SERPL-MCNC: 378 PG/ML (ref 0–100)
BUN SERPL-MCNC: 15 MG/DL (ref 5–25)
CALCIUM SERPL-MCNC: 8.7 MG/DL (ref 8.4–10.2)
CARDIAC TROPONIN I PNL SERPL HS: 27 NG/L
CARDIAC TROPONIN I PNL SERPL HS: 27 NG/L
CHLORIDE SERPL-SCNC: 107 MMOL/L (ref 96–108)
CO2 SERPL-SCNC: 29 MMOL/L (ref 21–32)
CREAT SERPL-MCNC: 1.16 MG/DL (ref 0.6–1.3)
EOSINOPHIL # BLD AUTO: 0.2 THOUSAND/ÂΜL (ref 0–0.61)
EOSINOPHIL NFR BLD AUTO: 2 % (ref 0–6)
ERYTHROCYTE [DISTWIDTH] IN BLOOD BY AUTOMATED COUNT: 14.6 % (ref 11.6–15.1)
GFR SERPL CREATININE-BSD FRML MDRD: 59 ML/MIN/1.73SQ M
GLUCOSE SERPL-MCNC: 95 MG/DL (ref 65–140)
HCT VFR BLD AUTO: 42.2 % (ref 36.5–49.3)
HGB BLD-MCNC: 12.9 G/DL (ref 12–17)
IMM GRANULOCYTES # BLD AUTO: 0.03 THOUSAND/UL (ref 0–0.2)
IMM GRANULOCYTES NFR BLD AUTO: 0 % (ref 0–2)
LYMPHOCYTES # BLD AUTO: 1.06 THOUSANDS/ÂΜL (ref 0.6–4.47)
LYMPHOCYTES NFR BLD AUTO: 11 % (ref 14–44)
MAGNESIUM SERPL-MCNC: 2 MG/DL (ref 1.9–2.7)
MCH RBC QN AUTO: 31.4 PG (ref 26.8–34.3)
MCHC RBC AUTO-ENTMCNC: 30.6 G/DL (ref 31.4–37.4)
MCV RBC AUTO: 103 FL (ref 82–98)
MONOCYTES # BLD AUTO: 0.96 THOUSAND/ÂΜL (ref 0.17–1.22)
MONOCYTES NFR BLD AUTO: 10 % (ref 4–12)
NEUTROPHILS # BLD AUTO: 7.52 THOUSANDS/ÂΜL (ref 1.85–7.62)
NEUTS SEG NFR BLD AUTO: 77 % (ref 43–75)
NRBC BLD AUTO-RTO: 0 /100 WBCS
PLATELET # BLD AUTO: 193 THOUSANDS/UL (ref 149–390)
PMV BLD AUTO: 10.6 FL (ref 8.9–12.7)
POTASSIUM SERPL-SCNC: 4.3 MMOL/L (ref 3.5–5.3)
PROT SERPL-MCNC: 5.4 G/DL (ref 6.4–8.4)
RBC # BLD AUTO: 4.11 MILLION/UL (ref 3.88–5.62)
SODIUM SERPL-SCNC: 142 MMOL/L (ref 135–147)
WBC # BLD AUTO: 9.81 THOUSAND/UL (ref 4.31–10.16)

## 2023-07-19 PROCEDURE — 93000 ELECTROCARDIOGRAM COMPLETE: CPT | Performed by: FAMILY MEDICINE

## 2023-07-19 PROCEDURE — 36415 COLL VENOUS BLD VENIPUNCTURE: CPT | Performed by: EMERGENCY MEDICINE

## 2023-07-19 PROCEDURE — G0439 PPPS, SUBSEQ VISIT: HCPCS | Performed by: FAMILY MEDICINE

## 2023-07-19 PROCEDURE — 80053 COMPREHEN METABOLIC PANEL: CPT | Performed by: EMERGENCY MEDICINE

## 2023-07-19 PROCEDURE — 83735 ASSAY OF MAGNESIUM: CPT | Performed by: EMERGENCY MEDICINE

## 2023-07-19 PROCEDURE — 99214 OFFICE O/P EST MOD 30 MIN: CPT | Performed by: FAMILY MEDICINE

## 2023-07-19 PROCEDURE — 84484 ASSAY OF TROPONIN QUANT: CPT | Performed by: EMERGENCY MEDICINE

## 2023-07-19 PROCEDURE — 93005 ELECTROCARDIOGRAM TRACING: CPT

## 2023-07-19 PROCEDURE — 85025 COMPLETE CBC W/AUTO DIFF WBC: CPT | Performed by: EMERGENCY MEDICINE

## 2023-07-19 PROCEDURE — 83880 ASSAY OF NATRIURETIC PEPTIDE: CPT | Performed by: EMERGENCY MEDICINE

## 2023-07-19 RX ORDER — BUDESONIDE 0.5 MG/2ML
0.5 INHALANT ORAL 2 TIMES DAILY
Qty: 180 ML | Refills: 0 | Status: SHIPPED | OUTPATIENT
Start: 2023-07-19

## 2023-07-19 RX ORDER — METOPROLOL SUCCINATE 25 MG/1
12.5 TABLET, EXTENDED RELEASE ORAL DAILY
Qty: 45 TABLET | Refills: 3 | Status: SHIPPED | OUTPATIENT
Start: 2023-07-19 | End: 2023-09-27

## 2023-07-19 RX ORDER — FUROSEMIDE 40 MG/1
40 TABLET ORAL DAILY PRN
Qty: 100 TABLET | Refills: 0 | Status: SHIPPED | OUTPATIENT
Start: 2023-07-19

## 2023-07-19 RX ORDER — FUROSEMIDE 10 MG/ML
40 INJECTION INTRAMUSCULAR; INTRAVENOUS ONCE
Status: COMPLETED | OUTPATIENT
Start: 2023-07-19 | End: 2023-07-19

## 2023-07-19 RX ADMIN — FUROSEMIDE 40 MG: 10 INJECTION, SOLUTION INTRAMUSCULAR; INTRAVENOUS at 19:56

## 2023-07-19 NOTE — PROGRESS NOTES
Assessment and Plan:     Problem List Items Addressed This Visit        Respiratory    Chronic respiratory failure with hypoxia ([address])    COPD (chronic obstructive pulmonary disease) ([address])       Other    Ambulatory dysfunction   Other Visit Diagnoses     Medicare annual wellness visit, subsequent    -  Primary    Edema of both lower extremities        Tachycardia        Relevant Orders    Comprehensive metabolic panel    Magnesium    ECG 12 lead    POCT ECG (Completed)        BMI Counseling: Body mass index is 29.77 kg/m². The BMI is above normal. Nutrition recommendations include decreasing portion sizes, encouraging healthy choices of fruits and vegetables, decreasing fast food intake, consuming healthier snacks, moderation in carbohydrate intake, increasing intake of lean protein and reducing intake of saturated and trans fat. Exercise recommendations include moderate physical activity 150 minutes/week. Rationale for BMI follow-up plan is due to patient being overweight or obese. Falls Plan of Care: balance, strength, and gait training instructions were provided and referral to physical therapy. Preventive health issues were discussed with patient, and age appropriate screening tests were ordered as noted in patient's After Visit Summary. Personalized health advice and appropriate referrals for health education or preventive services given if needed, as noted in patient's After Visit Summary. Recommended to call his pulmonologist today to discuss transient drops in O2 off of positive pressure. Continue thickening agent. Chest XR ordered given cough. EKG obtained today due to tachycardia and irregular cardiac rhythm- abnormal EKG, appears to be limb lead reversal but leads double checked and correctly placed. Recommended to go to hospital for repeat outpatient EKG. Complete blood work as well. Asymptomatic.    Discussed continued Lasix and potassium PRN for lower extremity edema, counseled low salt diet and compression. History of Present Illness:     Patient presents for a Medicare Wellness Visit    HPI     COPD and chronic RF- Uses CPAP at night, then in AM oxygen drops to 70s with regular nasal cannula. 3L NC currently. He has been coughing for 2-3 weeks. Since he cut back on thickening agent with liquids, now using thickening agent again. No recent falls, no lightheadedness. No chest pains. Patient Care Team:  Marcus Recio DO as PCP - General (Family Medicine)  Marcus Recio DO as PCP - [facility] ([facility])  Marcus Recio DO as PCP - [facility] ([facility])  Ferdinand Morales DO (Cardiology)  Arti Ivy MD (Urology)  Tereza Chiu MD (Radiation Oncology)  Jess Wilson MD (Pain Medicine)  Romelia Burkitt Doctor, MD (Vascular Surgery)     Review of Systems:     Review of Systems   All other systems reviewed and are negative.        Problem List:     Patient Active Problem List   Diagnosis   • Hyperlipidemia   • Coronary artery disease involving native coronary artery of native heart without angina pectoris   • Left carotid artery occlusion   • Hypertension   • Chronic obstructive pulmonary disease with acute exacerbation (HCC)   • Oxygen dependent   • Depression, recurrent (HCC)   • S/P carotid endarterectomy   • Stented coronary artery   • Vertigo   • Anisometropia   • Osteoarthritis of spinal facet joint   • Chronic ischemic heart disease   • Chronic diastolic (congestive) heart failure (HCC)   • Diverticular disease of colon   • Dry eye syndrome   • GERD (gastroesophageal reflux disease)   • Acute hearing loss, right   • Elevated PSA   • Hypoxia   • Lens replaced by other means   • Lumbar radiculopathy   • Multinodular goiter   • Nuclear senile cataract   • Obesity   • Occlusion and stenosis of carotid artery   • Osteoarthritis of hip   • Prediabetes   • LAMBERTO on CPAP   • Solitary pulmonary nodule   • Thyroid nodule   • Guyon syndrome, left   • Costochondral chest pain   • Chest pain   • Abnormal nuclear stress test   • Right hip pain   • Primary insomnia   • Chronic right-sided thoracic back pain   • Acute renal failure superimposed on stage 3a chronic kidney disease (HCC)   • Hoarseness or changing voice   • Chronic bilateral low back pain with right-sided sciatica   • Mid back pain   • Thoracic radiculopathy   • Chronic pain syndrome   • Urinary frequency   • Incomplete bladder emptying   • Intercostal neuralgia   • Cubital tunnel syndrome on left   • Carpal tunnel syndrome on left   • Elevated MCV   • Prostate cancer (HCC)   • Chronic respiratory failure with hypoxia (HCC)   • Obstructive sleep apnea syndrome in adult   • Sensorineural hearing loss, bilateral   • RSV (respiratory syncytial virus infection)   • Right lower quadrant abdominal pain   • COVID   • Ambulatory dysfunction   • COPD (chronic obstructive pulmonary disease) (HCC)   • Hypokalemia   • Left leg pain   • Transient right leg weakness   • Pneumothorax on right   • Leukocytosis   • Orthopnea   • Lower extremity edema   • Irregular heart rhythm   • Elevated troponin   • Intracranial aneurysm   • Congestive heart failure with left ventricular systolic dysfunction (LVSD) (HCC)   • Hypoxemia   • Dysphagia      Past Medical and Surgical History:     Past Medical History:   Diagnosis Date   • Abnormal ECG 2021 OR 2022   • Alcoholism ([address]) 1984    sober 38 years   • Arthritis 2008    beginning   • Bilateral carotid artery stenosis    • Callus    • Cancer (HCC)     skin   • Chronic diastolic heart failure (HCC)    • Chronic ischemic heart disease    • Chronic kidney disease     stage 3   • Colon polyp    • COPD (chronic obstructive pulmonary disease) (HCC)    • Coronary artery disease     hx stents, MI, PCI   • COVID 11/2021   • CPAP (continuous positive airway pressure) dependence    • Disease of thyroid gland 2017    nodules   • Emphysema of lung ([address]) 1995    started   • Hearing loss    • History of transfusion 1995   • Hyperlipidemia    • Hypertension    • Intracranial aneurysm 04/25/2023   • Lung nodule 2023    x rays   • MI (myocardial infarction) ([address]) 1995   • Myocardial infarction ([address]) 1995   • Pneumonia    • Pneumothorax 02/20/2023    collapsed lung   • Prostate cancer ([address])    • RSV (respiratory syncytial virus infection) 10/2022   • S/P carotid endarterectomy    • Shortness of breath     O2 2 l/nc PRN   • Sleep apnea    • Sleep apnea, obstructive    • Stented coronary artery      Past Surgical History:   Procedure Laterality Date   • APPENDECTOMY     • CARDIAC CATHETERIZATION  03/18/2021    left main with no significant disease, proximal LAD with 10% stenosis at the site of prior stent, left circumflex artery with minimal luminal irregularities mid RCA with 50% stenosis at site of prior stent and PL segment with distal disease supplied by collaterals from the distal circumflex with no significant CAD requiring revascularization at that time.    • CATARACT EXTRACTION, BILATERAL Bilateral    • COLONOSCOPY     • CORONARY ANGIOPLASTY WITH STENT PLACEMENT  1999    RCA   • CORONARY ANGIOPLASTY WITH STENT PLACEMENT  2001    RCA   • CORONARY ANGIOPLASTY WITH STENT PLACEMENT  2003    LAD   • EYE SURGERY     • WY BX PROSTATE STRTCTC SATURATION SAMPLING IMG GID N/A 09/13/2022    Procedure: TRANSPERINEAL MRI FUSION  BIOPSY PROSTATE;  Surgeon: Luis Cheney MD;  Location: [facility];  Service: Urology   • WY NEUROPLASTY &/TRANSPOS MEDIAN NRV CARPAL [place] Christopher Left 07/22/2022    Procedure: RELEASE CARPAL TUNNEL;  Surgeon: Reynold Cortez MD;  Location: [facility];  Service: Orthopedics   • WY NEUROPLASTY &/TRANSPOSITION ULNAR NERVE ELBOW Left 07/22/2022    Procedure: RELEASE CUBITAL TUNNEL;  Surgeon: Reynold Cortez MD;  [facility];  Service: Orthopedics   • WY NEUROPLASTY &/TRANSPOSITION ULNAR NERVE WRIST Left 07/22/2022    Procedure: Eliza Charles;  Surgeon: Reynold Cortez MD; Location: [facility];  Service: Orthopedics   • SKIN CANCER EXCISION  2012    chin-per pt, basal cell  also right ankle   • TONSILLECTOMY  no      Family History:     Family History   Problem Relation Age of Onset   • Heart attack Mother    • Dementia Mother    • Heart failure Mother             • Heart disease Mother         heart attacks (2)   • No Known Problems Father       Social History:     Social History     Socioeconomic History   • Marital status: /Civil Union     Spouse name: None   • Number of children: None   • Years of education: None   • Highest education level: None   Occupational History   • None   Tobacco Use   • Smoking status: Former     Packs/day: 2.00     Years: 35.00     Total pack years: 70.00     Types: Cigarettes     Start date: 1960     Quit date: 1995     Years since quittin.0   • Smokeless tobacco: Never   • Tobacco comments:     stopped smoking the day i had a heart attack   Vaping Use   • Vaping Use: Never used   Substance and Sexual Activity   • Alcohol use: Not Currently   • Drug use: Never   • Sexual activity: Not Currently     Partners: Female     Birth control/protection: None   Other Topics Concern   • None   Social History Narrative   • None     Social Determinants of Health     Financial Resource Strain: Unknown (2023)    Overall Financial Resource Strain (CARDIA)    • Difficulty of Paying Living Expenses: Patient refused   Food Insecurity: No Food Insecurity (2023)    Hunger Vital Sign    • Worried About Running Out of Food in the Last Year: Never true    • Ran Out of Food in the Last Year: Never true   Transportation Needs: Unknown (2023)    PRAPARE - Transportation    • Lack of Transportation (Medical): Patient refused    • Lack of Transportation (Non-Medical): Patient refused   Physical Activity: Not on file   Stress: Not on file   Social Connections: Not on file   Intimate Partner Violence: Not on file   Housing Stability: Low Risk  (5/30/2023)    Housing Stability Vital Sign    • Unable to Pay for Housing in the Last Year: No    • Number of Places Lived in the Last Year: 1    • Unstable Housing in the Last Year: No      Medications and Allergies:     Current Outpatient Medications   Medication Sig Dispense Refill   • aspirin 81 mg chewable tablet 81 mg daily at bedtime     • azithromycin (ZITHROMAX) 250 mg tablet Take 500 mg by mouth 3 (three) times a week Monday, Wednesday, Friday     • budesonide (PULMICORT) 0.5 mg/2 mL nebulizer solution Take 2 mL (0.5 mg total) by nebulization 2 (two) times a day Rinse mouth after use. 180 mL 0   • dextromethorphan-guaiFENesin (ROBITUSSIN DM)  mg/5 mL syrup Take 5 mL by mouth 3 (three) times a day as needed for cough 354 mL 0   • famotidine (PEPCID) 20 mg tablet TAKE 1 TABLET BY MOUTH DAILY AT  BEDTIME 100 tablet 2   • formoterol (PERFOROMIST) 20 MCG/2ML nebulizer solution Take 2 mL (20 mcg total) by nebulization 2 (two) times a day 180 mL 5   • furosemide (LASIX) 40 mg tablet Take 1 tablet (40 mg total) by mouth daily as needed (weight gain more than 3 lbs in 24 hours or worsening lower extremities edema) 100 tablet 0   • gabapentin (NEURONTIN) 300 mg capsule Take 1 capsule (300 mg total) by mouth daily at bedtime     • HYDROcodone-acetaminophen (Norco) 5-325 mg per tablet Take 1 tablet by mouth every 6 (six) hours as needed for pain Max Daily Amount: 4 tablets 15 tablet 0   • ipratropium-albuterol (DUO-NEB) 0.5-2.5 mg/3 mL nebulizer solution Take 3 mL by nebulization 4 (four) times a day 360 mL 5   • metoprolol succinate (TOPROL-XL) 25 mg 24 hr tablet Take 0.5 tablets (12.5 mg total) by mouth daily 45 tablet 3   • naloxone (NARCAN) 4 mg/0.1 mL nasal spray Administer 1 spray into a nostril. If no response after 2-3 minutes, give another dose in the other nostril using a new spray.  1 each 1   • oxygen gas Inhale 2 L/min continuous 2LPM at rest and 3-4 LPM with activity per D Bizarre PA notes • potassium chloride (K-DUR,KLOR-CON) 20 mEq tablet take 2 tablets by mouth once daily if needed (TO TAKE ONLY WITH LASIX) (Patient taking differently: daily) 60 tablet 0   • rosuvastatin (CRESTOR) 40 MG tablet TAKE 1 TABLET DAILY (Patient taking differently: Take 40 mg by mouth daily at bedtime) 100 tablet 3   • ALPRAZolam (XANAX) 0.25 mg tablet Take 1 tablet (0.25 mg total) by mouth 2 (two) times a day as needed for anxiety (Patient not taking: Reported on 7/5/2023) 15 tablet 0   • predniSONE 20 mg tablet 3 tabs by mouth days 1-5, 2 tabs by mouth days 6-10, 1 tab by mouth days 11-15 (Patient not taking: Reported on 5/12/2023) 30 tablet 0     No current facility-administered medications for this visit. Allergies   Allergen Reactions   • Lisinopril Swelling and Cough   • Tetanus Antitoxin Anaphylaxis   • Tetanus Toxoid Anaphylaxis and Swelling      Immunizations:     Immunization History   Administered Date(s) Administered   • COVID-19 MODERNA VACC 0.5 ML IM 01/19/2021, 02/16/2021, 11/02/2021, 11/29/2021, 06/13/2022   • INFLUENZA 09/24/2014, 11/30/2015, 12/01/2015, 11/06/2017, 11/10/2017, 09/19/2018, 09/11/2019, 10/01/2020, 10/27/2020   • Influenza Quadrivalent 3 years and older 10/13/2016   • Influenza, high dose seasonal 0.7 mL 10/19/2021, 11/03/2022   • Pneumococcal 01/01/2002   • Pneumococcal Conjugate 13-Valent 11/30/2018   • Pneumococcal Polysaccharide PPV23 01/14/2016   • Tuberculin Skin Test 04/17/2023   • Zoster 03/10/2016   • influenza, injectable, quadrivalent 10/13/2016      Health Maintenance:         Topic Date Due   • Hepatitis C Screening  Completed         Topic Date Due   • COVID-19 Vaccine (6 - Moderna series) 08/08/2022   • Influenza Vaccine (1) 09/01/2023      Medicare Screening Tests and Risk Assessments:     Qiana Vasquez is here for his Subsequent Wellness visit. Last Medicare Wellness visit information reviewed, patient interviewed and updates made to the record today.       Health Risk Assessment:   Patient rates overall health as fair. Patient feels that their physical health rating is slightly worse. Patient is satisfied with their life. Eyesight was rated as same. Hearing was rated as much worse. Patient feels that their emotional and mental health rating is slightly better. Patients states they are never, rarely angry. Patient states they are sometimes unusually tired/fatigued. Pain experienced in the last 7 days has been some. Patient's pain rating has been 3/10. Patient states that he has experienced weight loss or gain in last 6 months. Chronic rib pain. Depression Screening:   PHQ-9 Score: 1      Fall Risk Screening: In the past year, patient has experienced: history of falling in past year      Home Safety:  Patient has trouble with stairs inside or outside of their home. Patient has working smoke alarms and has no working carbon monoxide detector. Home safety hazards include: none. Nutrition:   Current diet is Regular. Medications:   Patient is currently taking over-the-counter supplements. OTC medications include: Baby aspirin  vitamin B, D and multivitamin. Patient is able to manage medications. Activities of Daily Living (ADLs)/Instrumental Activities of Daily Living (IADLs):   Walk and transfer into and out of bed and chair?: Yes  Dress and groom yourself?: Yes    Bathe or shower yourself?: Yes    Feed yourself? Yes  Do your laundry/housekeeping?: Yes  Manage your money, pay your bills and track your expenses?: Yes  Make your own meals?: Yes    Do your own shopping?: Yes    Previous Hospitalizations:   Any hospitalizations or ED visits within the last 12 months?: Yes    How many hospitalizations have you had in the last year?: more than 4    Advance Care Planning:   Living will: Yes    Durable POA for healthcare:  Yes    Advanced directive: Yes      Cognitive Screening:   Provider or family/friend/caregiver concerned regarding cognition?: No    PREVENTIVE SCREENINGS      Cardiovascular Screening:    General: Screening Not Indicated and History Lipid Disorder      Diabetes Screening:     General: Screening Current      Colorectal Cancer Screening:     General: Screening Not Indicated      Prostate Cancer Screening:    General: History Prostate Cancer and Screening Not Indicated      Osteoporosis Screening:    General: Screening Not Indicated      Abdominal Aortic Aneurysm (AAA) Screening:    Risk factors include: tobacco use        General: Screening Not Indicated      Lung Cancer Screening:     General: Screening Not Indicated      Hepatitis C Screening:    General: Screening Current    Screening, Brief Intervention, and Referral to Treatment (SBIRT)    Screening  Typical number of drinks in a day: 0  Typical number of drinks in a week: 0  Interpretation: Low risk drinking behavior. AUDIT-C Screenin) How often did you have a drink containing alcohol in the past year? never  2) How many drinks did you have on a typical day when you were drinking in the past year? 0  3) How often did you have 6 or more drinks on one occasion in the past year? never    AUDIT-C Score: 0  Interpretation: Score 0-3 (male): Negative screen for alcohol misuse    Single Item Drug Screening:  How often have you used an illegal drug (including marijuana) or a prescription medication for non-medical reasons in the past year? never    Single Item Drug Screen Score: 0  Interpretation: Negative screen for possible drug use disorder    Brief Intervention  Alcohol & drug use screenings were reviewed. No concerns regarding substance use disorder identified. Review of Current Opioid Use    Opioid Risk Tool (ORT) Interpretation: Complete Opioid Risk Tool (ORT)       Physical Exam:     /68   Pulse 92   Temp (!) 96.5 °F (35.8 °C) (Tympanic)   Resp 18   Ht 5' 8" (1.727 m)   Wt 88.8 kg (195 lb 12.8 oz)   SpO2 93%   BMI 29.77 kg/m²     Physical Exam  Vitals reviewed. Constitutional:       General: He is not in acute distress. Appearance: Normal appearance. He is not ill-appearing, toxic-appearing or diaphoretic. HENT:      Ears:      Comments: [place]  Cardiovascular:      Rate and Rhythm: Tachycardia present. Comments: Distant heart sounds   Pulmonary:      Effort: Pulmonary effort is normal. No respiratory distress. Breath sounds: Wheezing present. Comments: Bibasilar crackles   Musculoskeletal:      Right lower leg: Edema present. Left lower leg: Edema present. Comments: Pitting edema    Skin:     General: Skin is warm. Neurological:      Mental Status: He is alert and oriented to person, place, and time.    Psychiatric:         Mood and Affect: Mood normal.         Behavior: Behavior normal.          Dionne Martion,

## 2023-07-19 NOTE — ED PROVIDER NOTES
History  Chief Complaint   Patient presents with   • Medical Problem     Pt presents to ER from PCP - referred because of an abnormal EKG and pt states she weanted him to go to ER for another one. Patient arrives on 3 L nc (history of same), swelling in bilateral ankles x1 month. HPI    This is a very pleasant, nontoxic-appearing, hard of hearing, 79-year-old gentleman presents emerged part with his wife via private vehicle, wife is a reliable competent historian, patient was sent here for further evaluation to abnormal EKG in his PCPs office, because of the patient's tachycardia, commended in the chart at the office visit the patient's heart rate was in the 90s. Otherwise the patient offers no complaints at this time. Past medical history significant for hyperlipidemia, cardiac stents, left carotid artery occlusion, hypertension, COPD on 3 L nasal cannula, depression, status post carotid enterectomy, arthritis of the spinal facet joints, chronic diastolic heart failure, diverticular disease of the colon, obstructive sleep apnea on CPAP, solitary pulmonary nodule, thyroid nodules, mild obesity. Prior to Admission Medications   Prescriptions Last Dose Informant Patient Reported? Taking?    ALPRAZolam (XANAX) 0.25 mg tablet  Spouse/Significant Other No No   Sig: Take 1 tablet (0.25 mg total) by mouth 2 (two) times a day as needed for anxiety   Patient not taking: Reported on 2023   HYDROcodone-acetaminophen (Norco) 5-325 mg per tablet   No No   Sig: Take 1 tablet by mouth every 6 (six) hours as needed for pain Max Daily Amount: 4 tablets   aspirin 81 mg chewable tablet  Spouse/Significant Other Yes No   Si mg daily at bedtime   azithromycin (ZITHROMAX) 250 mg tablet  Spouse/Significant Other Yes No   Sig: Take 500 mg by mouth 3 (three) times a week Monday, Wednesday, Friday   budesonide (PULMICORT) 0.5 mg/2 mL nebulizer solution   No No   Sig: Take 2 mL (0.5 mg total) by nebulization 2 (two) times a day Rinse mouth after use. dextromethorphan-guaiFENesin (ROBITUSSIN DM)  mg/5 mL syrup  Spouse/Significant Other No No   Sig: Take 5 mL by mouth 3 (three) times a day as needed for cough   famotidine (PEPCID) 20 mg tablet  Spouse/Significant Other No No   Sig: TAKE 1 TABLET BY MOUTH DAILY AT  BEDTIME   formoterol (PERFOROMIST) 20 MCG/2ML nebulizer solution   No No   Sig: Take 2 mL (20 mcg total) by nebulization 2 (two) times a day   furosemide (LASIX) 40 mg tablet   No No   Sig: Take 1 tablet (40 mg total) by mouth daily as needed (weight gain more than 3 lbs in 24 hours or worsening lower extremities edema)   gabapentin (NEURONTIN) 300 mg capsule  Spouse/Significant Other No No   Sig: Take 1 capsule (300 mg total) by mouth daily at bedtime   ipratropium-albuterol (DUO-NEB) 0.5-2.5 mg/3 mL nebulizer solution   No No   Sig: Take 3 mL by nebulization 4 (four) times a day   metoprolol succinate (TOPROL-XL) 25 mg 24 hr tablet   No No   Sig: Take 0.5 tablets (12.5 mg total) by mouth daily   naloxone (NARCAN) 4 mg/0.1 mL nasal spray   No No   Sig: Administer 1 spray into a nostril. If no response after 2-3 minutes, give another dose in the other nostril using a new spray.    oxygen gas  Spouse/Significant Other Yes No   Sig: Inhale 2 L/min continuous 2LPM at rest and 3-4 LPM with activity per D Cedric FANG notes   potassium chloride (K-DUR,KLOR-CON) 20 mEq tablet  Self No No   Sig: take 2 tablets by mouth once daily if needed (TO TAKE ONLY WITH LASIX)   Patient taking differently: daily   predniSONE 20 mg tablet  Spouse/Significant Other No No   Sig: 3 tabs by mouth days 1-5, 2 tabs by mouth days 6-10, 1 tab by mouth days 11-15   Patient not taking: Reported on 5/12/2023   rosuvastatin (CRESTOR) 40 MG tablet  Spouse/Significant Other No No   Sig: TAKE 1 TABLET DAILY   Patient taking differently: Take 40 mg by mouth daily at bedtime      Facility-Administered Medications: None       Past Medical History:   Diagnosis Date   • Abnormal ECG 2021 OR 2022   • Alcoholism ([address]) 1984    sober 45 years   • Arthritis 2008    beginning   • Bilateral carotid artery stenosis    • Callus    • Cancer (HCC)     skin   • Chronic diastolic heart failure (HCC)    • Chronic ischemic heart disease    • Chronic kidney disease     stage 3   • Colon polyp    • COPD (chronic obstructive pulmonary disease) (HCC)    • Coronary artery disease     hx stents, MI, PCI   • COVID 11/2021   • CPAP (continuous positive airway pressure) dependence    • Disease of thyroid gland 2017    nodules   • Emphysema of lung ([address]) 1995    started   • Hearing loss    • History of transfusion 1995   • Hyperlipidemia    • Hypertension    • Intracranial aneurysm 04/25/2023   • Lung nodule 2023    x rays   • MI (myocardial infarction) ([address]) 1995   • Myocardial infarction ([address]) 1995   • Pneumonia    • Pneumothorax 02/20/2023    collapsed lung   • Prostate cancer ([address])    • RSV (respiratory syncytial virus infection) 10/2022   • S/P carotid endarterectomy    • Shortness of breath     O2 2 l/nc PRN   • Sleep apnea    • Sleep apnea, obstructive    • Stented coronary artery        Past Surgical History:   Procedure Laterality Date   • APPENDECTOMY     • CARDIAC CATHETERIZATION  03/18/2021    left main with no significant disease, proximal LAD with 10% stenosis at the site of prior stent, left circumflex artery with minimal luminal irregularities mid RCA with 50% stenosis at site of prior stent and PL segment with distal disease supplied by collaterals from the distal circumflex with no significant CAD requiring revascularization at that time.    • CATARACT EXTRACTION, BILATERAL Bilateral    • COLONOSCOPY     • CORONARY ANGIOPLASTY WITH STENT PLACEMENT  1999    RCA   • CORONARY ANGIOPLASTY WITH STENT PLACEMENT  2001    RCA   • CORONARY ANGIOPLASTY WITH STENT PLACEMENT  2003    LAD   • EYE SURGERY     • OR BX PROSTATE STRTCTC SATURATION SAMPLING IMG GID N/A 09/13/2022    Procedure: TRANSPERINEAL MRI FUSION  BIOPSY PROSTATE;  Surgeon: Edith Torres MD;  Location: [facility];  Service: Urology   • OK NEUROPLASTY &/TRANSPOS MEDIAN NRV Lottie Peresen Left 2022    Procedure: RELEASE CARPAL TUNNEL;  Surgeon: Min Sandoval MD;  Location: [facility];  Service: Orthopedics   • OK NEUROPLASTY &/TRANSPOSITION ULNAR NERVE ELBOW Left 2022    Procedure: RELEASE CUBITAL TUNNEL;  Surgeon: Min Sandoval MD;  Location[facility];  Service: Orthopedics   • OK NEUROPLASTY &/TRANSPOSITION ULNAR NERVE WRIST Left 2022    Procedure: Luh Poot;  Surgeon: Min Sandoval MD;  Location: [facility];  Service: Orthopedics   • SKIN CANCER EXCISION  2012    chin-per pt, basal cell  also right ankle   • TONSILLECTOMY  no       Family History   Problem Relation Age of Onset   • Heart attack Mother    • Dementia Mother    • Heart failure Mother             • Heart disease Mother         heart attacks (2)   • No Known Problems Father      I have reviewed and agree with the history as documented. E-Cigarette/Vaping   • E-Cigarette Use Never User      E-Cigarette/Vaping Substances   • Nicotine No    • THC No    • CBD No    • Flavoring No    • Other No    • Unknown No      Social History     Tobacco Use   • Smoking status: Former     Packs/day: 2.00     Years: 35.00     Total pack years: 70.00     Types: Cigarettes     Start date: 1960     Quit date: 1995     Years since quittin.0   • Smokeless tobacco: Never   • Tobacco comments:     stopped smoking the day i had a heart attack   Vaping Use   • Vaping Use: Never used   Substance Use Topics   • Alcohol use: Not Currently   • Drug use: Never       Review of Systems   Constitutional: Negative. HENT: Negative. Eyes: Negative. Respiratory: Negative. Cardiovascular: Positive for leg swelling. Gastrointestinal: Negative. Endocrine: Negative. Genitourinary: Negative. Musculoskeletal: Negative. Skin: Negative. Allergic/Immunologic: Negative. Neurological: Negative. Hematological: Negative. Psychiatric/Behavioral: Negative. Physical Exam  Physical Exam  Vitals and nursing note reviewed. Constitutional:       Appearance: Normal appearance. He is normal weight. HENT:      Head: Normocephalic and atraumatic. Right Ear: External ear normal.      Left Ear: External ear normal.      Nose: Nose normal.      Mouth/Throat:      Mouth: Mucous membranes are moist.      Pharynx: Oropharynx is clear. Eyes:      Extraocular Movements: Extraocular movements intact. Conjunctiva/sclera: Conjunctivae normal.      Pupils: Pupils are equal, round, and reactive to light. Cardiovascular:      Rate and Rhythm: Normal rate and regular rhythm. Pulses: Normal pulses. Heart sounds: Normal heart sounds. Pulmonary:      Effort: Pulmonary effort is normal.      Breath sounds: Normal breath sounds. Abdominal:      General: Abdomen is flat. Bowel sounds are normal.   Musculoskeletal:         General: Normal range of motion. Cervical back: Normal range of motion. Skin:     General: Skin is warm. Capillary Refill: Capillary refill takes less than 2 seconds. Neurological:      General: No focal deficit present. Mental Status: He is alert and oriented to person, place, and time. Mental status is at baseline. Psychiatric:         Mood and Affect: Mood normal.         Behavior: Behavior normal.         Thought Content:  Thought content normal.         Judgment: Judgment normal.         Vital Signs  ED Triage Vitals [07/19/23 1608]   Temperature Pulse Respirations Blood Pressure SpO2   98.4 °F (36.9 °C) 84 20 104/56 94 %      Temp Source Heart Rate Source Patient Position - Orthostatic VS BP Location FiO2 (%)   Tympanic Monitor Sitting Left arm --      Pain Score       No Pain           Vitals:    07/19/23 1608 07/19/23 1615 07/19/23 1845 07/19/23 1955   BP: 104/56 104/56 110/55 119/71   Pulse: 84 96 97 96   Patient Position - Orthostatic VS: Sitting  Sitting Sitting         Visual Acuity      ED Medications  Medications   furosemide (LASIX) injection 40 mg (40 mg Intravenous Given 7/19/23 1956)       Diagnostic Studies  Results Reviewed     Procedure Component Value Units Date/Time    HS Troponin I 2hr [[id number]]  (Normal) Collected: 07/19/23 1920    Lab Status: Final result Specimen: Blood from Arm, Left Updated: 07/19/23 1952     hs TnI 2hr 27 ng/L      Delta 2hr hsTnI 0 ng/L     HS Troponin I 4hr [[id number]]     Lab Status: No result Specimen: Blood     HS Troponin 0hr (reflex protocol) [[id number]]  (Normal) Collected: 07/19/23 1721    Lab Status: Final result Specimen: Blood from Arm, Left Updated: 07/19/23 1754     hs TnI 0hr 27 ng/L     B-Type Natriuretic Peptide(BNP) [[id number]]  (Abnormal) Collected: 07/19/23 1721    Lab Status: Final result Specimen: Blood from Arm, Left Updated: 07/19/23 1753      pg/mL     Comprehensive metabolic panel [[id number]]  (Abnormal) Collected: 07/19/23 1721    Lab Status: Final result Specimen: Blood from Arm, Left Updated: 07/19/23 1748     Sodium 142 mmol/L      Potassium 4.3 mmol/L      Chloride 107 mmol/L      CO2 29 mmol/L      ANION GAP 6 mmol/L      BUN 15 mg/dL      Creatinine 1.16 mg/dL      Glucose 95 mg/dL      Calcium 8.7 mg/dL      AST 19 U/L      ALT 17 U/L      Alkaline Phosphatase 55 U/L      Total Protein 5.4 g/dL      Albumin 3.6 g/dL      Total Bilirubin 0.45 mg/dL      eGFR 59 ml/min/1.73sq m     Narrative:      [place] guidelines for Chronic Kidney Disease (CKD):   •  Stage 1 with normal or high GFR (GFR > 90 mL/min/1.73 square meters)  •  Stage 2 Mild CKD (GFR = 60-89 mL/min/1.73 square meters)  •  Stage 3A Moderate CKD (GFR = 45-59 mL/min/1.73 square meters)  •  Stage 3B Moderate CKD (GFR = 30-44 mL/min/1.73 square meters)  •  Stage 4 Severe CKD (GFR = 15-29 mL/min/1.73 square meters)  • Stage 5 End Stage CKD (GFR <15 mL/min/1.73 square meters)  Note: GFR calculation is accurate only with a steady state creatinine    Magnesium [[id number]]  (Normal) Collected: 07/19/23 1721    Lab Status: Final result Specimen: Blood from Arm, Left Updated: 07/19/23 1748     Magnesium 2.0 mg/dL     CBC and differential [[id number]]  (Abnormal) Collected: 07/19/23 1721    Lab Status: Final result Specimen: Blood from Arm, Left Updated: 07/19/23 1726     WBC 9.81 Thousand/uL      RBC 4.11 Million/uL      Hemoglobin 12.9 g/dL      Hematocrit 42.2 %       fL      MCH 31.4 pg      MCHC 30.6 g/dL      RDW 14.6 %      MPV 10.6 fL      Platelets 255 Thousands/uL      nRBC 0 /100 WBCs      Neutrophils Relative 77 %      Immat GRANS % 0 %      Lymphocytes Relative 11 %      Monocytes Relative 10 %      Eosinophils Relative 2 %      Basophils Relative 0 %      Neutrophils Absolute 7.52 Thousands/µL      Immature Grans Absolute 0.03 Thousand/uL      Lymphocytes Absolute 1.06 Thousands/µL      Monocytes Absolute 0.96 Thousand/µL      Eosinophils Absolute 0.20 Thousand/µL      Basophils Absolute 0.04 Thousands/µL                  No orders to display              Procedures  ECG 12 Lead Documentation Only    Date/Time: 7/19/2023 4:17 PM    Performed by: Rahel Webster DO  Authorized by: Debby Taylor III, DO    Indications / Diagnosis:  Abnormal ekg at PCP office  ECG reviewed by me, the ED Provider: yes    Patient location:  ED  Comments:      Personally reviewed this EKG that was performed the patient 9/19/2023, EKG was completed at 4:15 PM and interpreted by me at 4:17 PM, this rhythm with occasional PVCs, ventricular rate of 94 bpm, remaining portion intervals within normal limits. No diffuse elevations to indicate pericarditis. No coved ST elevations greater than 2mm with negative T waves in V1-3 to indicate concern for brugada.   No biphasic T waves in V2, V3 to indicate Wellens (critical stenosis of LAD). No elevation in aVR or deviation when compared to V1 (can be associated with ST depression in I,II, V4-6 when left main occlusion is present). ECG 12 Lead Documentation Only    Date/Time: 7/19/2023 8:02 PM    Performed by: Kristina GudvilleDO  Authorized by: Issa Vyas III, DO    Indications / Diagnosis:  Abnormal EKG prehospital  ECG reviewed by me, the ED Provider: yes    Patient location:  ED  Comments:      I personally reviewed this EKG that was performed in the on July 19, 2023, EKG was completed at 8 PM inter by me at 8:02 PM, sinus tachycardia with occasional PVCs, low voltage noted, ventricular rate of 101 bpm, otherwise no acute ST abnormalities. No diffuse elevations to indicate pericarditis. No coved ST elevations greater than 2mm with negative T waves in V1-3 to indicate concern for brugada. No biphasic T waves in V2, V3 to indicate Wellens (critical stenosis of LAD). No elevation in aVR or deviation when compared to V1 (can be associated with ST depression in I,II, V4-6 when left main occlusion is present). ED Course  ED Course as of 07/19/23 2017 Wed Jul 19, 2023   1637 Patient seen and evaluated, orders placed, evaluated, patient was sent here for further evaluation due to EKG changes. 1800 Noted BNP of 378, pt did not take his lasix this morning. SBIRT 22yo+    Flowsheet Row Most Recent Value   Initial Alcohol Screen: US AUDIT-C     1. How often do you have a drink containing alcohol? 0 Filed at: 07/19/2023 1617   2. How many drinks containing alcohol do you have on a typical day you are drinking? 0 Filed at: 07/19/2023 1617   3a. Male UNDER 65: How often do you have five or more drinks on one occasion? 0 Filed at: 07/19/2023 1617   3b. FEMALE Any Age, or MALE 65+: How often do you have 4 or more drinks on one occassion?  0 Filed at: 07/19/2023 1617   Audit-C Score 0 Filed at: 07/19/2023 1617   CRISTOPHER: How many times in the past year have you. .. Used an illegal drug or used a prescription medication for non-medical reasons? Never Filed at: 07/19/2023 5693                    Medical Decision Making  Patient offers no complaints sent here for further evaluation because his heart rate was elevated as per the wife who was at the bedside assisting the patient in terms of his hearing deficits and interacting with staff. Patient does have a history of chronic heart failure, did not take his Lasix today, BNP was marginally elevated therefore his daily dose of Lasix was prescribed, EKG x2 unremarkable, patient stable for discharge to follow-up with his PCP,    Portions of the record may have been created with voice recognition software. Occasional wrong word or "sound a like" substitutions may have occurred due to the inherent limitations of voice recognition software. Read the chart carefully and recognize, using context, where substitutions have occurred. Counseling: I had a detailed discussion with the patient and/or guardian regarding: the historical points, exam findings, and any diagnostic results supporting the discharge diagnosis, lab results, radiology results, discharge instructions reviewed with patient and/or family/caregiver and understanding was verbalized. Instructions given to return to the emergency department if symptoms worsen or persist, or if there are any questions or concerns that arise at home.     Amount and/or Complexity of Data Reviewed  Labs: ordered. Risk  Prescription drug management.           Disposition  Final diagnoses:   Hard of hearing   Chronic heart failure ([address])     Time reflects when diagnosis was documented in both MDM as applicable and the Disposition within this note     Time User Action Codes Description Comment    7/19/2023  4:38 PM Latisha Juarez Add [H91.90] Hard of hearing     7/19/2023  8:05 PM Latisha Juarez Add [I50.9] Chronic heart failure [facility])       ED Disposition     ED Disposition   Discharge    Condition   Stable    Date/Time   Wed Jul 19, 2023  8:05 PM    Comment   Landen Su discharge to home/self care. Follow-up Information    None         Patient's Medications   Discharge Prescriptions    No medications on file       No discharge procedures on file.     PDMP Review       Value Time User    PDMP Reviewed  Yes 6/30/2023  5:21 PM Candice Arenas DO          ED Provider  Electronically Signed by           Tip Santillan III, DO  07/19/23 2017

## 2023-07-19 NOTE — PATIENT INSTRUCTIONS
Medicare Preventive Visit Patient Instructions  Thank you for completing your Welcome to Medicare Visit or Medicare Annual Wellness Visit today. Your next wellness visit will be due in one year (7/19/2024). The screening/preventive services that you may require over the next 5-10 years are detailed below. Some tests may not apply to you based off risk factors and/or age. Screening tests ordered at today's visit but not completed yet may show as past due. Also, please note that scanned in results may not display below. Preventive Screenings:  Service Recommendations Previous Testing/Comments   Colorectal Cancer Screening  · Colonoscopy    · Fecal Occult Blood Test (FOBT)/Fecal Immunochemical Test (FIT)  · Fecal DNA/Cologuard Test  · Flexible Sigmoidoscopy Age: 43-73 years old   Colonoscopy: every 10 years (May be performed more frequently if at higher risk)  OR  FOBT/FIT: every 1 year  OR  Cologuard: every 3 years  OR  Sigmoidoscopy: every 5 years  Screening may be recommended earlier than age 39 if at higher risk for colorectal cancer. Also, an individualized decision between you and your healthcare provider will decide whether screening between the ages of 77-80 would be appropriate.  Colonoscopy: Not on file  FOBT/FIT: Not on file  Cologuard: Not on file  Sigmoidoscopy: Not on file          Prostate Cancer Screening Individualized decision between patient and health care provider in men between ages of 53-66   Medicare will cover every 12 months beginning on the day after your 50th birthday PSA: 11.23 ng/mL     History Prostate Cancer  Screening Not Indicated     Hepatitis C Screening Once for adults born between 1945 and 1965  More frequently in patients at high risk for Hepatitis C Hep C Antibody: 04/18/2022    Screening Current   Diabetes Screening 1-2 times per year if you're at risk for diabetes or have pre-diabetes Fasting glucose: 101 mg/dL (6/20/2023)  A1C: 5.9 % (1/27/2023)  Screening Current Cholesterol Screening Once every 5 years if you don't have a lipid disorder. May order more often based on risk factors. Lipid panel: 01/27/2023  Screening Not Indicated  History Lipid Disorder      Other Preventive Screenings Covered by Medicare:  1. Abdominal Aortic Aneurysm (AAA) Screening: covered once if your at risk. You're considered to be at risk if you have a family history of AAA or a male between the age of 70-76 who smoking at least 100 cigarettes in your lifetime. 2. Lung Cancer Screening: covers low dose CT scan once per year if you meet all of the following conditions: (1) Age 48-67; (2) No signs or symptoms of lung cancer; (3) Current smoker or have quit smoking within the last 15 years; (4) You have a tobacco smoking history of at least 20 pack years (packs per day x number of years you smoked); (5) You get a written order from a healthcare provider. 3. Glaucoma Screening: covered annually if you're considered high risk: (1) You have diabetes OR (2) Family history of glaucoma OR (3)  aged 48 and older OR (3)  American aged 72 and older  3. Osteoporosis Screening: covered every 2 years if you meet one of the following conditions: (1) Have a vertebral abnormality; (2) On glucocorticoid therapy for more than 3 months; (3) Have primary hyperparathyroidism; (4) On osteoporosis medications and need to assess response to drug therapy. 5. HIV Screening: covered annually if you're between the age of 14-79. Also covered annually if you are younger than 13 and older than 72 with risk factors for HIV infection. For pregnant patients, it is covered up to 3 times per pregnancy.     Immunizations:  Immunization Recommendations   Influenza Vaccine Annual influenza vaccination during flu season is recommended for all persons aged >= 6 months who do not have contraindications   Pneumococcal Vaccine   * Pneumococcal conjugate vaccine = PCV13 (Prevnar 13), PCV15 (Vaxneuvance), PCV20 (Prevnar 20)  * Pneumococcal polysaccharide vaccine = PPSV23 (Pneumovax) Adults 2364 years old: 1-3 doses may be recommended based on certain risk factors  Adults 72 years old: 1-2 doses may be recommended based off what pneumonia vaccine you previously received   Hepatitis B Vaccine 3 dose series if at intermediate or high risk (ex: diabetes, end stage renal disease, liver disease)   Tetanus (Td) Vaccine - COST NOT COVERED BY MEDICARE PART B Following completion of primary series, a booster dose should be given every 10 years to maintain immunity against tetanus. Td may also be given as tetanus wound prophylaxis. Tdap Vaccine - COST NOT COVERED BY MEDICARE PART B Recommended at least once for all adults. For pregnant patients, recommended with each pregnancy. Shingles Vaccine (Shingrix) - COST NOT COVERED BY MEDICARE PART B  2 shot series recommended in those aged 48 and above     Health Maintenance Due:      Topic Date Due   • Hepatitis C Screening  Completed     Immunizations Due:      Topic Date Due   • COVID-19 Vaccine (6 - Moderna series) 08/08/2022   • Influenza Vaccine (1) 09/01/2023     Advance Directives   What are advance directives? Advance directives are legal documents that state your wishes and plans for medical care. These plans are made ahead of time in case you lose your ability to make decisions for yourself. Advance directives can apply to any medical decision, such as the treatments you want, and if you want to donate organs. What are the types of advance directives? There are many types of advance directives, and each state has rules about how to use them. You may choose a combination of any of the following:  · Living will: This is a written record of the treatment you want. You can also choose which treatments you do not want, which to limit, and which to stop at a certain time. This includes surgery, medicine, IV fluid, and tube feedings.    · Durable power of  for healthcare [facility]): This is a written record that states who you want to make healthcare choices for you when you are unable to make them for yourself. This person, called a proxy, is usually a family member or a friend. You may choose more than 1 proxy. · Do not resuscitate (DNR) order:  A DNR order is used in case your heart stops beating or you stop breathing. It is a request not to have certain forms of treatment, such as CPR. A DNR order may be included in other types of advance directives. · Medical directive: This covers the care that you want if you are in a coma, near death, or unable to make decisions for yourself. You can list the treatments you want for each condition. Treatment may include pain medicine, surgery, blood transfusions, dialysis, IV or tube feedings, and a ventilator (breathing machine). · Values history: This document has questions about your views, beliefs, and how you feel and think about life. This information can help others choose the care that you would choose. Why are advance directives important? An advance directive helps you control your care. Although spoken wishes may be used, it is better to have your wishes written down. Spoken wishes can be misunderstood, or not followed. Treatments may be given even if you do not want them. An advance directive may make it easier for your family to make difficult choices about your care. Fall Prevention    Fall prevention  includes ways to make your home and other areas safer. It also includes ways you can move more carefully to prevent a fall. Health conditions that cause changes in your blood pressure, vision, or muscle strength and coordination may increase your risk for falls. Medicines may also increase your risk for falls if they make you dizzy, weak, or sleepy. Fall prevention tips:   · Stand or sit up slowly. · Use assistive devices as directed. · Wear shoes that fit well and have soles that . · Wear a personal alarm. · Stay active. · Manage your medical conditions. Home Safety Tips:  · Add items to prevent falls in the bathroom. · Keep paths clear. · Install bright lights in your home. · Keep items you use often on shelves within reach. · Paint or place reflective tape on the edges of your stairs. Weight Management   Why it is important to manage your weight:  Being overweight increases your risk of health conditions such as heart disease, high blood pressure, type 2 diabetes, and certain types of cancer. It can also increase your risk for osteoarthritis, sleep apnea, and other respiratory problems. Aim for a slow, steady weight loss. Even a small amount of weight loss can lower your risk of health problems. How to lose weight safely:  A safe and healthy way to lose weight is to eat fewer calories and get regular exercise. You can lose up about 1 pound a week by decreasing the number of calories you eat by 500 calories each day. Healthy meal plan for weight management:  A healthy meal plan includes a variety of foods, contains fewer calories, and helps you stay healthy. A healthy meal plan includes the following:  · Eat whole-grain foods more often. A healthy meal plan should contain fiber. Fiber is the part of grains, fruits, and vegetables that is not broken down by your body. Whole-grain foods are healthy and provide extra fiber in your diet. Some examples of whole-grain foods are whole-wheat breads and pastas, oatmeal, brown rice, and bulgur. · Eat a variety of vegetables every day. Include dark, leafy greens such as spinach, kale, herbie greens, and mustard greens. Eat yellow and orange vegetables such as carrots, sweet potatoes, and winter squash. · Eat a variety of fruits every day. Choose fresh or canned fruit (canned in its own juice or light syrup) instead of juice. Fruit juice has very little or no fiber. · Eat low-fat dairy foods. Drink fat-free (skim) milk or 1% milk.  Eat fat-free yogurt and low-fat cottage cheese. Try low-fat cheeses such as mozzarella and other reduced-fat cheeses. · Choose meat and other protein foods that are low in fat. Choose beans or other legumes such as split peas or lentils. Choose fish, skinless poultry (chicken or turkey), or lean cuts of red meat (beef or pork). Before you cook meat or poultry, cut off any visible fat. · Use less fat and oil. Try baking foods instead of frying them. Add less fat, such as margarine, sour cream, regular salad dressing and mayonnaise to foods. Eat fewer high-fat foods. Some examples of high-fat foods include french fries, doughnuts, ice cream, and cakes. · Eat fewer sweets. Limit foods and drinks that are high in sugar. This includes candy, cookies, regular soda, and sweetened drinks. Exercise:  Exercise at least 30 minutes per day on most days of the week. Some examples of exercise include walking, biking, dancing, and swimming. You can also fit in more physical activity by taking the stairs instead of the elevator or parking farther away from stores. Ask your healthcare provider about the best exercise plan for you. Narcotic (Opioid) Safety    Use narcotics safely:  · Take prescribed narcotics exactly as directed  · Do not give narcotics to others or take narcotics that belong to someone else  · Do not mix narcotics without medicines or alcohol  · Do not drive or operate heavy machinery after you take the narcotic  · Monitor for side effects and notify your healthcare provider if you experienced side effects such as nausea, sleepiness, itching, or trouble thinking clearly. Manage constipation:    Constipation is the most common side effect of narcotic medicine. Constipation is when you have hard, dry bowel movements, or you go longer than usual between bowel movements. Tell your healthcare provider about all changes in your bowel movements while you are taking narcotics.  He or she may recommend laxative medicine to help you have a bowel movement. He or she may also change the kind of narcotic you are taking, or change when you take it. The following are more ways you can prevent or relieve constipation:    · Drink liquids as directed. You may need to drink extra liquids to help soften and move your bowels. Ask how much liquid to drink each day and which liquids are best for you. · Eat high-fiber foods. This may help decrease constipation by adding bulk to your bowel movements. High-fiber foods include fruits, vegetables, whole-grain breads and cereals, and beans. Your healthcare provider or dietitian can help you create a high-fiber meal plan. Your provider may also recommend a fiber supplement if you cannot get enough fiber from food. · Exercise regularly. Regular physical activity can help stimulate your intestines. Walking is a good exercise to prevent or relieve constipation. Ask which exercises are best for you. · Schedule a time each day to have a bowel movement. This may help train your body to have regular bowel movements. Bend forward while you are on the toilet to help move the bowel movement out. Sit on the toilet for at least 10 minutes, even if you do not have a bowel movement. Store narcotics safely:   · Store narcotics where others cannot easily get them. Keep them in a locked cabinet or secure area. Do not  keep them in a purse or other bag you carry with you. A person may be looking for something else and find the narcotics. · Make sure narcotics are stored out of the reach of children. A child can easily overdose on narcotics. Narcotics may look like candy to a small child. The best way to dispose of narcotics: The laws vary by country and area. In the [facility], the best way is to return the narcotics through a take-back program. This program is offered by the [employer] ([employer]).  The following are options for using the program:  · Take the narcotics to a [employer] collection site.  The site is often a law enforcement center. Call your local law enforcement center for scheduled take-back days in your area. You will be given information on where to go if the collection site is in a different location. · Take the narcotics to an approved pharmacy or hospital.  A pharmacy or hospital may be set up as a collection site. You will need to ask if it is a [employer] collection site if you were not directed there. A pharmacy or doctor's office may not be able to take back narcotics unless it is a [employer] site. · Use a mail-back system. This means you are given containers to put the narcotics into. You will then mail them in the containers. · Use a take-back drop box. This is a place to leave the narcotics at any time. People and animals will not be able to get into the box. Your local law enforcement agency can tell you where to find a drop box in your area. Other ways to manage pain:   · Ask your healthcare provider about non-narcotic medicines to control pain. Nonprescription medicines include NSAIDs (such as ibuprofen) and acetaminophen. Prescription medicines include muscle relaxers, antidepressants, and steroids. · Pain may be managed without any medicines. Some ways to relieve pain include massage, aromatherapy, or meditation. Physical or occupational therapy may also help. For more information:   · Drug Enforcement Administration  [address] [facility] , [address] [facility]  Phone: 3- [phone] - [phone]  Web Address: [facility] [email or web address]    · [address] [facility] [address] and Drug Administration  [address] [facility] , [address] [facility] [address]  Phone: 3- [phone]  Web Address: http://[employer]/     © Copyright [address] 2018 Information is for End User's use only and may not be sold, redistributed or otherwise used for commercial purposes.  All illustrations and images included in CareNotes® are the copyrighted property of [employer] Inc. or [address] [place], [place] [address]ols

## 2023-07-19 NOTE — TELEPHONE ENCOUNTER
Patient requesting refill(s) of: Lasix 40mg    Last filled: 6/22/23 #100 x1    Patient requesting refill(s) of: Pulmicort 0.5mg    Last filled: 7/4/23 #180mL x5  Last appt: 5/26/23  Next appt: 7/19/23  Pharmacy: [facility]

## 2023-07-20 LAB
ATRIAL RATE: 94 BPM
P AXIS: 62 DEGREES
PR INTERVAL: 164 MS
QRS AXIS: 67 DEGREES
QRSD INTERVAL: 92 MS
QT INTERVAL: 372 MS
QTC INTERVAL: 465 MS
T WAVE AXIS: 44 DEGREES
VENTRICULAR RATE: 94 BPM

## 2023-07-20 PROCEDURE — 93010 ELECTROCARDIOGRAM REPORT: CPT | Performed by: INTERNAL MEDICINE

## 2023-07-21 LAB
ATRIAL RATE: 101 BPM
P AXIS: 67 DEGREES
PR INTERVAL: 160 MS
QRS AXIS: 67 DEGREES
QRSD INTERVAL: 96 MS
QT INTERVAL: 372 MS
QTC INTERVAL: 482 MS
T WAVE AXIS: 8 DEGREES
VENTRICULAR RATE: 101 BPM

## 2023-07-21 PROCEDURE — 93010 ELECTROCARDIOGRAM REPORT: CPT | Performed by: INTERNAL MEDICINE

## 2023-07-23 RX ORDER — METHOCARBAMOL 500 MG/1
500 TABLET, FILM COATED ORAL 3 TIMES DAILY PRN
Qty: 30 TABLET | Refills: 0 | Status: SHIPPED | OUTPATIENT
Start: 2023-07-23

## 2023-07-24 ENCOUNTER — DOCUMENTATION (OUTPATIENT)
Dept: HEMATOLOGY ONCOLOGY | Facility: CLINIC | Age: 78
End: 2023-07-24

## 2023-07-24 DIAGNOSIS — E87.6 HYPOKALEMIA: ICD-10-CM

## 2023-07-24 NOTE — PROGRESS NOTES
Chart reviewed. Patient received Lupron on 7/14/23. Per Urology notes patient will be doing ADT alone without surgical or radiation plans at this time due to his pulmonary comorbidity. Patient has follow up on 10/2/23 with Pulmonary office. I will follow up after that appt to see if there will be changes in his treatment plan.

## 2023-07-24 NOTE — TELEPHONE ENCOUNTER
Patient requesting refill(s) of: Potassium    Last filled: 6/30/23 #60 x0  Last appt: 7/19/23  Next appt: 7/26/23  [facility]: [facility]

## 2023-07-25 ENCOUNTER — RA CDI HCC (OUTPATIENT)
Dept: OTHER | Facility: HOSPITAL | Age: 78
End: 2023-07-25

## 2023-07-25 RX ORDER — POTASSIUM CHLORIDE 20 MEQ/1
20 TABLET, EXTENDED RELEASE ORAL DAILY
Qty: 90 TABLET | Refills: 0 | Status: SHIPPED | OUTPATIENT
Start: 2023-07-25

## 2023-07-25 NOTE — TELEPHONE ENCOUNTER
20mg normally, 40 mg if the patient is taking a water will would like the medications sent through the mail in service [facility],    Apt for tomorrow canceled and the patients daughter will call the office back if there are any changes, also will call to make follow up apt and have patient get labwork done,

## 2023-07-25 NOTE — PROGRESS NOTES
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

## 2023-07-27 ENCOUNTER — OFFICE VISIT (OUTPATIENT)
Dept: CARDIOLOGY CLINIC | Facility: CLINIC | Age: 78
End: 2023-07-27
Payer: COMMERCIAL

## 2023-07-27 VITALS
WEIGHT: 191 LBS | DIASTOLIC BLOOD PRESSURE: 70 MMHG | SYSTOLIC BLOOD PRESSURE: 120 MMHG | BODY MASS INDEX: 28.95 KG/M2 | HEART RATE: 80 BPM | HEIGHT: 68 IN

## 2023-07-27 DIAGNOSIS — E87.6 HYPOKALEMIA: ICD-10-CM

## 2023-07-27 DIAGNOSIS — I25.10 CORONARY ARTERY DISEASE INVOLVING NATIVE CORONARY ARTERY OF NATIVE HEART, UNSPECIFIED WHETHER ANGINA PRESENT: ICD-10-CM

## 2023-07-27 DIAGNOSIS — I10 PRIMARY HYPERTENSION: ICD-10-CM

## 2023-07-27 DIAGNOSIS — E78.5 HYPERLIPIDEMIA, UNSPECIFIED HYPERLIPIDEMIA TYPE: ICD-10-CM

## 2023-07-27 DIAGNOSIS — I50.20 HFREF (HEART FAILURE WITH REDUCED EJECTION FRACTION) (HCC): Primary | ICD-10-CM

## 2023-07-27 PROCEDURE — 99214 OFFICE O/P EST MOD 30 MIN: CPT | Performed by: INTERNAL MEDICINE

## 2023-07-27 RX ORDER — POTASSIUM CHLORIDE 20 MEQ/1
TABLET, EXTENDED RELEASE ORAL
Qty: 60 TABLET | OUTPATIENT
Start: 2023-07-27

## 2023-07-27 NOTE — PROGRESS NOTES
Cardiology Follow up    Liseth Romo  [id number]  1945  [facility] [place] [facility] [address] [address]      1. Coronary artery disease involving native coronary artery of native heart, unspecified whether angina present        2. HFrEF (heart failure with reduced ejection fraction) ([address] )  Ambulatory Referral to Cardiology      3. Primary hypertension        4. Hyperlipidemia, unspecified hyperlipidemia type            Discussion/Summary:  1. Heart failure with reduced ejection fraction  2. Coronary artery disease  3. Hypertension  4. Hyperlipidemia  5. COPD on 3 L nasal cannula oxygen  6.   Aortic stenosis    -Transthoracic echocardiogram 2/3/2023 showing left-ventricular systolic function moderately reduced estimated LVEF 40% with global hypokinesis with mild to moderately dilated left atrium, mild aortic regurgitation, mild aortic stenosis, moderate mitral regurgitation with mild tricuspid regurgitation.  -Patient will continue metoprolol succinate 12.5 mg twice daily, Crestor 40 mg daily, aspirin 81 mg daily and furosemide 40 mg daily and 20 mEq potassium daily  -Patient will bring blood pressure logs with him to next office visit in 1 month and if blood pressure appears to be stable as there was issues with hypotension in the past we will consider possible addition of SGLT2 inhibitor therapy at that time  -Patient counseled on dietary and lifestyle modifications including following a low-salt, low-fat, heart healthy diet with sodium restriction to less than 1800 mg of sodium daily fluid restriction to less than 1800 mL of fluid daily along with recommendations for additional potassium and diuretic with furosemide tablet for worsening symptoms, weight gain greater than 3 pounds in 1 day or 5 pounds in 1 week and to let our office know if he needs to do this more than twice in 1 week  -We will see patient in 1 month or sooner if necessary  -Patient has follow-up blood work from his primary care physician in order to monitor renal function and electrolytes  -Patient counseled if he were to have any warning or alarm type symptoms he is to seek emergency medical care immediately. History of Present Illness:  -Patient is a 65-year-old male with hypertension, hyperlipidemia, obesity, COPD with chronic nasal cannula oxygen use now on 3 L nasal cannula oxygen with chronic antibiotic use, obstructive sleep apnea compliant with CPAP therapy, history of coronary artery disease with prior MI in 1995 with PCI to RCA, stenting in 1999 and 2001 involving RCA then again 2003 with stenting to LAD with catheterization in March 2021 showing nonobstructive disease with no need for revascularization at that time along with asymptomatic PVCs hospitalized multiple times over the past year with both pneumonia, pneumonitis, COPD exacerbations requiring IV steroid therapy who presents to the office today for scheduled follow-up. He was having issues with worsening lower extremity edema over the past couple months and was initiated on furosemide 40 mg daily with potassium supplementation by his primary care physician again with good urine output as patient notes lower extremity edema significantly improved and shortness of breath improved. He is still sleeping on elevated pillows at home and blood pressure readings brought to the office today show majority of readings in the 061-230 mmHg systolic range with diastolics predominantly in the 60s mmHg range.     Patient Active Problem List   Diagnosis   • Hyperlipidemia   • Coronary artery disease involving native coronary artery of native heart without angina pectoris   • Left carotid artery occlusion   • Hypertension   • Chronic obstructive pulmonary disease with acute exacerbation (HCC)   • Oxygen dependent   • Depression, recurrent ([address])   • S/P carotid endarterectomy   • Stented coronary artery   • Vertigo   • Anisometropia   • Osteoarthritis of spinal facet joint   • Chronic ischemic heart disease   • Chronic diastolic (congestive) heart failure ([facility])   • Diverticular disease of colon   • Dry eye syndrome   • GERD (gastroesophageal reflux disease)   • Acute hearing loss, right   • Elevated PSA   • Hypoxia   • Lens replaced by other means   • Lumbar radiculopathy   • Multinodular goiter   • Nuclear senile cataract   • Obesity   • Occlusion and stenosis of carotid artery   • Osteoarthritis of hip   • Prediabetes   • LAMBERTO on CPAP   • Solitary pulmonary nodule   • Thyroid nodule   • Guyon syndrome, left   • Costochondral chest pain   • Chest pain   • Abnormal nuclear stress test   • Right hip pain   • Primary insomnia   • Chronic right-sided thoracic back pain   • Acute renal failure superimposed on stage 3a chronic kidney disease ([facility])   • Hoarseness or changing voice   • Chronic bilateral low back pain with right-sided sciatica   • Mid back pain   • Thoracic radiculopathy   • Chronic pain syndrome   • Urinary frequency   • Incomplete bladder emptying   • Intercostal neuralgia   • Cubital tunnel syndrome on left   • Carpal tunnel syndrome on left   • Elevated MCV   • Prostate cancer ([facility])   • Chronic respiratory failure with hypoxia ([facility])   • Obstructive sleep apnea syndrome in adult   • Sensorineural hearing loss, bilateral   • RSV (respiratory syncytial virus infection)   • Right lower quadrant abdominal pain   • COVID   • Ambulatory dysfunction   • COPD (chronic obstructive pulmonary disease) ([facility])   • Hypokalemia   • Left leg pain   • Transient right leg weakness   • Pneumothorax on right   • Leukocytosis   • Orthopnea   • Lower extremity edema   • Irregular heart rhythm   • Elevated troponin   • Intracranial aneurysm   • Congestive heart failure with left ventricular systolic dysfunction (LVSD) ([facility])   • Hypoxemia   • Dysphagia     Past Medical History:   Diagnosis Date   • Abnormal ECG  OR    • Alcoholism ([address]) 1984    sober 45 years   • Arthritis 2008    beginning   • Bilateral carotid artery stenosis    • Callus    • Cancer (HCC)     skin   • Chronic diastolic heart failure (HCC)    • Chronic ischemic heart disease    • Chronic kidney disease     stage 3   • Colon polyp    • COPD (chronic obstructive pulmonary disease) (HCC)    • Coronary artery disease     hx stents, MI, PCI   • COVID 2021   • CPAP (continuous positive airway pressure) dependence    • Disease of thyroid gland 2017    nodules   • Emphysema of lung ([address])     started   • Hearing loss    • History of transfusion    • Hyperlipidemia    • Hypertension    • Intracranial aneurysm 2023   • Lung nodule     x rays   • MI (myocardial infarction) ([address])    • Myocardial infarction ([address])    • Pneumonia    • Pneumothorax 2023    collapsed lung   • Prostate cancer ([address])    • RSV (respiratory syncytial virus infection) 10/2022   • S/P carotid endarterectomy    • Shortness of breath     O2 2 l/nc PRN   • Sleep apnea    • Sleep apnea, obstructive    • Stented coronary artery      Social History     Socioeconomic History   • Marital status: /Civil Union     Spouse name: Not on file   • Number of children: Not on file   • Years of education: Not on file   • Highest education level: Not on file   Occupational History   • Not on file   Tobacco Use   • Smoking status: Former     Packs/day: 2.00     Years: 35.00     Total pack years: 70.00     Types: Cigarettes     Start date: 1960     Quit date: 1995     Years since quittin.0   • Smokeless tobacco: Never   • Tobacco comments:     stopped smoking the day i had a heart attack   Vaping Use   • Vaping Use: Never used   Substance and Sexual Activity   • Alcohol use: Not Currently   • Drug use: Never   • Sexual activity: Not Currently     Partners: Female     Birth control/protection: None   Other Topics Concern   • Not on file   Social History Narrative   • Not on file     Social Determinants of Health     Financial Resource Strain: Unknown (2023)    Overall Financial Resource Strain (CARDIA)    • Difficulty of Paying Living Expenses: Patient refused   Food Insecurity: No Food Insecurity (2023)    Hunger Vital Sign    • Worried About Running Out of Food in the Last Year: Never true    • Ran Out of Food in the Last Year: Never true   Transportation Needs: Unknown (2023)    PRAPARE - Transportation    • Lack of Transportation (Medical): Patient refused    • Lack of Transportation (Non-Medical): Patient refused   Physical Activity: Not on file   Stress: Not on file   Social Connections: Not on file   Intimate Partner Violence: Not on file   Housing Stability: [address]  (2023)    Housing Stability Vital Sign    • Unable to Pay for Housing in the Last Year: No    • Number of [address] in the Last Year: 1    • Unstable Housing in the Last Year: No      Family History   Problem Relation Age of Onset   • Heart attack Mother    • Dementia Mother    • Heart failure Mother             • Heart disease Mother         heart attacks (2)   • No Known Problems Father      Past Surgical History:   Procedure Laterality Date   • APPENDECTOMY     • CARDIAC CATHETERIZATION  2021    left main with no significant disease, proximal LAD with 10% stenosis at the site of prior stent, left circumflex artery with minimal luminal irregularities mid RCA with 50% stenosis at site of prior stent and PL segment with distal disease supplied by collaterals from the distal circumflex with no significant CAD requiring revascularization at that time.    • CATARACT EXTRACTION, BILATERAL Bilateral    • COLONOSCOPY     • CORONARY ANGIOPLASTY WITH STENT PLACEMENT      RCA   • CORONARY ANGIOPLASTY WITH STENT PLACEMENT      RCA   • CORONARY ANGIOPLASTY WITH STENT PLACEMENT      LAD   • EYE SURGERY     • NE BX PROSTATE STRTCTC SATURATION SAMPLING IMG GID N/A 09/13/2022    Procedure: TRANSPERINEAL MRI FUSION  BIOPSY PROSTATE;  Surgeon: Gilma Acevedo MD;  Location: [facility];  Service: Urology   • MD NEUROPLASTY &/TRANSPOS MEDIAN NRV CARPAL Joann Ambar Left 07/22/2022    Procedure: RELEASE CARPAL TUNNEL;  Surgeon: Tanya Bishop MD;  Location: [facility];  Service: Orthopedics   • MD NEUROPLASTY &/TRANSPOSITION ULNAR NERVE ELBOW Left 07/22/2022    Procedure: RELEASE CUBITAL TUNNEL;  Surgeon: Tanya Bishop MD;  Location[facility];  Service: Orthopedics   • MD NEUROPLASTY &/TRANSPOSITION ULNAR NERVE WRIST Left 07/22/2022    Procedure: Trudi Doles RELEASE;  Surgeon: Tanya Bishop MD;  Location: [facility];  Service: Orthopedics   • SKIN CANCER EXCISION  2012    chin-per pt, basal cell  also right ankle   • TONSILLECTOMY  no       Current Outpatient Medications:   •  aspirin 81 mg chewable tablet, 81 mg daily at bedtime, Disp: , Rfl:   •  azithromycin (ZITHROMAX) 250 mg tablet, Take 500 mg by mouth 3 (three) times a week Monday, Wednesday, Friday, Disp: , Rfl:   •  budesonide (PULMICORT) 0.5 mg/2 mL nebulizer solution, Take 2 mL (0.5 mg total) by nebulization 2 (two) times a day Rinse mouth after use., Disp: 180 mL, Rfl: 0  •  dextromethorphan-guaiFENesin (ROBITUSSIN DM)  mg/5 mL syrup, Take 5 mL by mouth 3 (three) times a day as needed for cough, Disp: 354 mL, Rfl: 0  •  famotidine (PEPCID) 20 mg tablet, TAKE 1 TABLET BY MOUTH DAILY AT  BEDTIME, Disp: 100 tablet, Rfl: 2  •  formoterol (PERFOROMIST) 20 MCG/2ML nebulizer solution, Take 2 mL (20 mcg total) by nebulization 2 (two) times a day, Disp: 180 mL, Rfl: 5  •  furosemide (LASIX) 40 mg tablet, Take 1 tablet (40 mg total) by mouth daily as needed (weight gain more than 3 lbs in 24 hours or worsening lower extremities edema), Disp: 100 tablet, Rfl: 0  •  gabapentin (NEURONTIN) 300 mg capsule, Take 1 capsule (300 mg total) by mouth daily at bedtime, Disp: , Rfl:   •  HYDROcodone-acetaminophen (Norco) 5-325 mg per tablet, Take 1 tablet by mouth every 6 (six) hours as needed for pain Max Daily Amount: 4 tablets, Disp: 15 tablet, Rfl: 0  •  ipratropium-albuterol (DUO-NEB) 0.5-2.5 mg/3 mL nebulizer solution, Take 3 mL by nebulization 4 (four) times a day, Disp: 360 mL, Rfl: 5  •  methocarbamol (ROBAXIN) 500 mg tablet, Take 1 tablet (500 mg total) by mouth 3 (three) times a day as needed for muscle spasms, Disp: 30 tablet, Rfl: 0  •  metoprolol succinate (TOPROL-XL) 25 mg 24 hr tablet, Take 0.5 tablets (12.5 mg total) by mouth daily, Disp: 45 tablet, Rfl: 3  •  naloxone (NARCAN) 4 mg/0.1 mL nasal spray, Administer 1 spray into a nostril.  If no response after 2-3 minutes, give another dose in the other nostril using a new spray., Disp: 1 each, Rfl: 1  •  oxygen gas, Inhale 2 L/min continuous 2LPM at rest and 3-4 LPM with activity per ROXANNE FANG notes, Disp: , Rfl:   •  potassium chloride (K-DUR,KLOR-CON) 20 mEq tablet, Take 1 tablet (20 mEq total) by mouth daily, Disp: 90 tablet, Rfl: 0  •  rosuvastatin (CRESTOR) 40 MG tablet, TAKE 1 TABLET DAILY (Patient taking differently: Take 40 mg by mouth daily at bedtime), Disp: 100 tablet, Rfl: 3  •  ALPRAZolam (XANAX) 0.25 mg tablet, Take 1 tablet (0.25 mg total) by mouth 2 (two) times a day as needed for anxiety (Patient not taking: Reported on 7/5/2023), Disp: 15 tablet, Rfl: 0  •  predniSONE 20 mg tablet, 3 tabs by mouth days 1-5, 2 tabs by mouth days 6-10, 1 tab by mouth days 11-15 (Patient not taking: Reported on 5/12/2023), Disp: 30 tablet, Rfl: 0  Allergies   Allergen Reactions   • Lisinopril Swelling and Cough   • Tetanus Antitoxin Anaphylaxis   • Tetanus Toxoid Anaphylaxis and Swelling         Labs:  Admission on 07/19/2023, Discharged on 07/19/2023   Component Date Value   • Ventricular Rate 07/19/2023 94    • Atrial Rate 07/19/2023 94    • AK Interval 07/19/2023 164    • QRSD Interval 07/19/2023 92    • QT Interval 07/19/2023 372    • QTC Interval 07/19/2023 465    • P Axis 07/19/2023 62    • QRS Axis 07/19/2023 67    • T Wave Axis 07/19/2023 44    • WBC 07/19/2023 9.81    • RBC 07/19/2023 4.11    • Hemoglobin 07/19/2023 12.9    • Hematocrit 07/19/2023 42.2    • MCV 07/19/2023 103 (H)    • MCH 07/19/2023 31.4    • MCHC 07/19/2023 30.6 (L)    • RDW 07/19/2023 14.6    • MPV 07/19/2023 10.6    • Platelets 59/62/7382 193    • nRBC 07/19/2023 0    • Neutrophils Relative 07/19/2023 77 (H)    • Immat GRANS % 07/19/2023 0    • Lymphocytes Relative 07/19/2023 11 (L)    • Monocytes Relative 07/19/2023 10    • Eosinophils Relative 07/19/2023 2    • Basophils Relative 07/19/2023 0    • Neutrophils Absolute 07/19/2023 7.52    • Immature Grans Absolute 07/19/2023 0.03    • Lymphocytes Absolute 07/19/2023 1.06    • Monocytes Absolute 07/19/2023 0.96    • Eosinophils Absolute 07/19/2023 0.20    • Basophils Absolute 07/19/2023 0.04    • Sodium 07/19/2023 142    • Potassium 07/19/2023 4.3    • Chloride 07/19/2023 107    • CO2 07/19/2023 29    • ANION GAP 07/19/2023 6    • BUN 07/19/2023 15    • Creatinine 07/19/2023 1.16    • Glucose 07/19/2023 95    • Calcium 07/19/2023 8.7    • AST 07/19/2023 19    • ALT 07/19/2023 17    • Alkaline Phosphatase 07/19/2023 55    • Total Protein 07/19/2023 5.4 (L)    • Albumin 07/19/2023 3.6    • Total Bilirubin 07/19/2023 0.45    • eGFR 07/19/2023 59    • hs TnI 0hr 07/19/2023 27    • BNP 07/19/2023 378 (H)    • Magnesium 07/19/2023 2.0    • hs TnI 2hr 07/19/2023 27    • Delta 2hr hsTnI 07/19/2023 0    • Ventricular Rate 07/19/2023 101    • Atrial Rate 07/19/2023 101    • DE Interval 07/19/2023 160    • QRSD Interval 07/19/2023 96    • QT Interval 07/19/2023 372    • QTC Interval 07/19/2023 482    • P Axis 07/19/2023 67    • QRS Axis 07/19/2023 67    • T Wave Axis 07/19/2023 8    Appointment on 06/20/2023   Component Date Value   • Sodium 06/20/2023 143    • Potassium 06/20/2023 4.0    • Chloride 06/20/2023 103 • CO2 06/20/2023 36 (H)    • ANION GAP 06/20/2023 4    • BUN 06/20/2023 11    • Creatinine 06/20/2023 0.98    • Glucose, Fasting 06/20/2023 101 (H)    • Calcium 06/20/2023 9.2    • eGFR 06/20/2023 73    Appointment on 06/15/2023   Component Date Value   • PSA, Diagnostic 06/15/2023 11.23 (H)    Appointment on 06/05/2023   Component Date Value   • Sodium 06/05/2023 141    • Potassium 06/05/2023 3.5    • Chloride 06/05/2023 103    • CO2 06/05/2023 33 (H)    • ANION GAP 06/05/2023 5    • BUN 06/05/2023 9    • Creatinine 06/05/2023 0.94    • Glucose, Fasting 06/05/2023 85    • Calcium 06/05/2023 8.4    • eGFR 06/05/2023 76    Appointment on 06/02/2023   Component Date Value   • Sodium 06/02/2023 142    • Potassium 06/02/2023 4.2    • Chloride 06/02/2023 105    • CO2 06/02/2023 30    • ANION GAP 06/02/2023 7    • BUN 06/02/2023 12    • Creatinine 06/02/2023 1.04    • Glucose, Fasting 06/02/2023 78    • Calcium 06/02/2023 8.5    • Corrected Calcium 06/02/2023 9.1    • AST 06/02/2023 34    • ALT 06/02/2023 25    • Alkaline Phosphatase 06/02/2023 60    • Total Protein 06/02/2023 5.3 (L)    • Albumin 06/02/2023 3.3 (L)    • Total Bilirubin 06/02/2023 0.53    • eGFR 06/02/2023 67    • Vitamin B-12 06/02/2023 312    Admission on 05/28/2023, Discharged on 05/30/2023   Component Date Value   • WBC 05/28/2023 7.68    • RBC 05/28/2023 4.45    • Hemoglobin 05/28/2023 13.8    • Hematocrit 05/28/2023 43.6    • MCV 05/28/2023 98    • MCH 05/28/2023 31.0    • MCHC 05/28/2023 31.7    • RDW 05/28/2023 15.2 (H)    • MPV 05/28/2023 10.0    • Platelets 74/88/6659 177    • nRBC 05/28/2023 0    • Neutrophils Relative 05/28/2023 70    • Immat GRANS % 05/28/2023 0    • Lymphocytes Relative 05/28/2023 13 (L)    • Monocytes Relative 05/28/2023 12    • Eosinophils Relative 05/28/2023 4    • Basophils Relative 05/28/2023 1    • Neutrophils Absolute 05/28/2023 5.36    • Immature Grans Absolute 05/28/2023 0.03    • Lymphocytes Absolute 05/28/2023 1.02    • Monocytes Absolute 05/28/2023 0.94    • Eosinophils Absolute 05/28/2023 0.29    • Basophils Absolute 05/28/2023 0.04    • Sodium 05/28/2023 140    • Potassium 05/28/2023 2.4 (LL)    • Chloride 05/28/2023 95 (L)    • CO2 05/28/2023 35 (H)    • ANION GAP 05/28/2023 10    • BUN 05/28/2023 14    • Creatinine 05/28/2023 1.56 (H)    • Glucose 05/28/2023 134    • Calcium 05/28/2023 8.9    • eGFR 05/28/2023 41    • Color, UA 05/28/2023 Yellow    • Clarity, UA 05/28/2023 Clear    • Specific Gravity, UA 05/28/2023 1.010    • pH, UA 05/28/2023 6.0    • Leukocytes, UA 05/28/2023 Negative    • Nitrite, UA 05/28/2023 Negative    • Protein, UA 05/28/2023 Negative    • Glucose, UA 05/28/2023 Negative    • Ketones, UA 05/28/2023 Negative    • Urobilinogen, UA 05/28/2023 0.2    • Bilirubin, UA 05/28/2023 Negative    • Occult Blood, UA 05/28/2023 1+ (A)    • Magnesium 05/28/2023 2.3    • BNP 05/28/2023 111 (H)    • POC Glucose 05/28/2023 112    • RBC, UA 05/28/2023 0-1    • WBC, UA 05/28/2023 None Seen    • Epithelial Cells 05/28/2023 Occasional    • Bacteria, UA 05/28/2023 None Seen    • Sodium 05/28/2023 143    • Potassium 05/28/2023 2.8 (L)    • Chloride 05/28/2023 100    • CO2 05/28/2023 37 (H)    • ANION GAP 05/28/2023 6    • BUN 05/28/2023 13    • Creatinine 05/28/2023 1.55 (H)    • Glucose 05/28/2023 107    • Calcium 05/28/2023 8.2 (L)    • eGFR 05/28/2023 41    • Sodium 05/29/2023 141    • Potassium 05/29/2023 3.1 (L)    • Chloride 05/29/2023 104    • CO2 05/29/2023 33 (H)    • ANION GAP 05/29/2023 4    • BUN 05/29/2023 13    • Creatinine 05/29/2023 1.46 (H)    • Glucose 05/29/2023 101    • Calcium 05/29/2023 7.8 (L)    • Corrected Calcium 05/29/2023 8.6    • AST 05/29/2023 17    • ALT 05/29/2023 15    • Alkaline Phosphatase 05/29/2023 54    • Total Protein 05/29/2023 4.9 (L)    • Albumin 05/29/2023 3.0 (L)    • Total Bilirubin 05/29/2023 0.62    • eGFR 05/29/2023 45    • Magnesium 05/29/2023 2.1    • WBC 05/29/2023 6.37    • RBC 05/29/2023 3.68 (L)    • Hemoglobin 05/29/2023 11.6 (L)    • Hematocrit 05/29/2023 37.2    • MCV 05/29/2023 101 (H)    • MCH 05/29/2023 31.5    • MCHC 05/29/2023 31.2 (L)    • RDW 05/29/2023 15.5 (H)    • Platelets 18/22/6998 152    • MPV 05/29/2023 9.5    • POC Glucose 05/29/2023 86    • Magnesium 05/30/2023 2.1    • Sodium 05/30/2023 142    • Potassium 05/30/2023 3.5    • Chloride 05/30/2023 107    • CO2 05/30/2023 31    • ANION GAP 05/30/2023 4    • BUN 05/30/2023 10    • Creatinine 05/30/2023 1.09    • Glucose 05/30/2023 96    • Calcium 05/30/2023 8.1 (L)    • eGFR 05/30/2023 64    • Ventricular Rate 05/28/2023 93    • Atrial Rate 05/28/2023 93    • CO Interval 05/28/2023 160    • QRSD Interval 05/28/2023 102    • QT Interval 05/28/2023 356    • QTC Interval 05/28/2023 442    • P Axis 05/28/2023 70    • QRS Axis 05/28/2023 62    • T Wave Axis 05/28/2023 236         Imaging: CT chest wo contrast    Addendum Date: 7/6/2023 Addendum:   ADDENDUM: Of note, surveillance interval may be helpful in additional 2-3 months time. Bronchoscopy could also be considered given persistent consolidation, especially in the right lower lobe, which has now been present since February. Follow-up was marked in the epic system. Result Date: 7/6/2023  Narrative: CT CHEST WITHOUT IV CONTRAST INDICATION:   J18.9: Pneumonia, unspecified organism. COMPARISON: 5/1/2023; 4/3/2023; 2/28/2022; 1/15/2021 TECHNIQUE: CT examination of the chest was performed without intravenous contrast. Multiplanar 2D reformatted images were created from the source data. This examination, like all CT scans performed in the [facility], was performed utilizing techniques to minimize radiation dose exposure, including the use of iterative reconstruction and automated exposure control. Radiation dose length product (DLP) for this visit:  414.09 mGy-cm FINDINGS: LUNGS: Bullous emphysema is identified.  Dependent changes on the left with some somewhat rounded atelectasis is similar to the prior study of May and improved since the study of February. Some mixed irregular consolidative, reticular and groundglass opacity is noted in the right upper lobe with some improvement peripherally although a new small area of consolidation is seen medially, image 118, series 3. Patchy areas of consolidation are seen in the right lower lobe which is new in the superior segment on image 138, series 3. Some groundglass density is seen in the lower lobe where areas of consolidation were noted although some consolidation persists inferiorly in the right lower lobe. There is evidence of calcifications in the right lower lobe with the appearance of broncholiths similar to the prior study but increased since older exams. There is occlusion of some peripheral bronchi approaching the consolidation in the right lower lobe where is the central bronchi appear patent. PLEURA:  Unremarkable. HEART/GREAT VESSELS: Heavy atherosclerotic coronary artery calcification is noted. Heart is otherwise unremarkable. Aortic valve calcification is noted. No thoracic aortic aneurysm. MEDIASTINUM AND ZION: Small lymph nodes in the mediastinum are unchanged. CHEST WALL AND LOWER NECK: Symmetric gynecomastia. Conchetta Savory VISUALIZED STRUCTURES IN THE UPPER ABDOMEN: Tiny circumscribed hypodensities are noted in the liver without significant change. Probable small right renal cyst is noted in the right kidney. OSSEOUS STRUCTURES:  No acute fracture or destructive osseous lesion. Impression: Overall significant improvement in the multifocal areas of consolidation since the study of May with now residual groundglass opacity present in many of these areas. Fairly dense consolidation is still seen at the right lung base inferiorly. There are a few new irregular areas of consolidation which are smaller.  Possibilities include to include waxing and waning inflammation or recurrent aspiration as suggested previously. Of note an older examination from  does not show the its areas of consolidation although a few calcifications were present at that time. Continued surveillance is suggested to ensure resolution especially given the few new areas of consolidation Workstation performed: [id number]     Complete PFT with post bronchodilator    Result Date: 2023  Narrative: Pulmonary Function Test Report Karoline Forrester 77 y.o. male MRN: [id number] Date of Testin2023 Date of Interpretation: 2023 Requesting Physician: Dr. Shane Armenta Reason for Testing: COPD Reference set for interpretation:  Procedure: The patient was taken to pulmonary function testing laboratory. The patient demonstrated good effort and cooperation. The results of this test meet ATS standards for acceptability and repeatability. Data set appears appropriate for interpretation. Post bronchodilator testing performed after the administration of 2.5mg albuterol in 3cc normal saline administered via nebulizer per bronchodilator protocol. Results: FEV1/FVC Ratio: 44% FEV1: 1.06 L 38% predicted Forced Vital Capacity: 2.43 L 66% predicted After administration of bronchodilator: No significant change Lung volumes: Total Lung Capacity 107% predicted Residual volume 175% predicted DLCO corrected for patients hemoglobin level: 26% predicted Flow volume loop: Consistent with obstruction Interpretation: • Spirometry with severe obstruction and reduced vital capacity due to air trapping • No change with bronchodilators • Volumes with moderate to severe air trapping • Severely reduced diffusion capacity Patient may be a candidate for bronchoscopic lung for reduction, based on PFT criteria Luz Kilpatrick D.O., [facility]     Review of Systems:  Review of Systems   Constitutional: Negative for chills, diaphoresis, fatigue and fever. HENT: Negative for trouble swallowing and voice change.     Eyes: Negative for pain and redness. Respiratory: Negative for shortness of breath and wheezing. Cardiovascular: Positive for leg swelling. Negative for chest pain and palpitations. Gastrointestinal: Negative for abdominal pain, constipation, diarrhea, nausea and vomiting. Genitourinary: Negative for dysuria. Decreased urine volume: improved after elevation. Musculoskeletal: Positive for arthralgias. Negative for neck pain and neck stiffness. Skin: Negative for rash. Neurological: Negative for dizziness, syncope, light-headedness and headaches. Psychiatric/Behavioral: Negative for agitation and confusion. Vitals:    07/27/23 1344   BP: 120/70   Pulse: 80   Weight: 86.6 kg (191 lb)   Height: 5' 8" (1.727 m)     Vitals:    07/27/23 1344   Weight: 86.6 kg (191 lb)     Height: 5' 8" (172.7 cm)     Physical Exam:  Physical Exam  Vitals reviewed. Constitutional:       General: He is not in acute distress. Appearance: He is not diaphoretic. HENT:      Head: Normocephalic and atraumatic. Comments: Nasal cannula oxygen in place  Eyes:      General:         Right eye: No discharge. Left eye: No discharge. Neck:      Comments: Trachea midline, minimal JVD present  Cardiovascular:      Rate and Rhythm: Normal rate and regular rhythm. Heart sounds: Murmur (BRENDEN) heard. Pulmonary:      Effort: No respiratory distress. Breath sounds: No wheezing. Comments: Decreased breath sounds bilaterally  Chest:      Chest wall: No tenderness. Abdominal:      General: Bowel sounds are normal.      Palpations: Abdomen is soft. Tenderness: There is no abdominal tenderness. There is no rebound. Musculoskeletal:      Right lower leg: Edema (1+) present. Left lower leg: Edema (1+) present. Skin:     General: Skin is warm and dry. Neurological:      Mental Status: He is alert.       Comments: Awake, alert, able to answer questions appropriately, able to move extremities bilaterally, hard of hearing   Psychiatric:         Mood and Affect: Mood normal.         Behavior: Behavior normal.

## 2023-08-02 ENCOUNTER — OFFICE VISIT (OUTPATIENT)
Dept: LAB | Facility: HOSPITAL | Age: 78
End: 2023-08-02
Payer: COMMERCIAL

## 2023-08-02 ENCOUNTER — HOSPITAL ENCOUNTER (OUTPATIENT)
Dept: RADIOLOGY | Facility: HOSPITAL | Age: 78
Discharge: HOME | End: 2023-08-02
Payer: COMMERCIAL

## 2023-08-02 ENCOUNTER — APPOINTMENT (OUTPATIENT)
Dept: LAB | Facility: HOSPITAL | Age: 78
End: 2023-08-02
Payer: COMMERCIAL

## 2023-08-02 DIAGNOSIS — R00.0 TACHYCARDIA: ICD-10-CM

## 2023-08-02 DIAGNOSIS — C61 MALIGNANT NEOPLASM OF PROSTATE (HCC): ICD-10-CM

## 2023-08-02 DIAGNOSIS — E87.6 HYPOKALEMIA: ICD-10-CM

## 2023-08-02 DIAGNOSIS — I10 PRIMARY HYPERTENSION: Chronic | ICD-10-CM

## 2023-08-02 DIAGNOSIS — R05.9 COUGH, UNSPECIFIED TYPE: ICD-10-CM

## 2023-08-02 LAB
ALBUMIN SERPL BCP-MCNC: 4.3 G/DL (ref 3.5–5)
ALP SERPL-CCNC: 63 U/L (ref 34–104)
ALT SERPL W P-5'-P-CCNC: 20 U/L (ref 7–52)
ANION GAP SERPL CALCULATED.3IONS-SCNC: 10 MMOL/L
AST SERPL W P-5'-P-CCNC: 24 U/L (ref 13–39)
ATRIAL RATE: 91 BPM
BILIRUB SERPL-MCNC: 0.57 MG/DL (ref 0.2–1)
BUN SERPL-MCNC: 18 MG/DL (ref 5–25)
CALCIUM SERPL-MCNC: 9.3 MG/DL (ref 8.4–10.2)
CHLORIDE SERPL-SCNC: 102 MMOL/L (ref 96–108)
CO2 SERPL-SCNC: 31 MMOL/L (ref 21–32)
CREAT SERPL-MCNC: 1.19 MG/DL (ref 0.6–1.3)
GFR SERPL CREATININE-BSD FRML MDRD: 57 ML/MIN/1.73SQ M
GLUCOSE P FAST SERPL-MCNC: 101 MG/DL (ref 65–99)
P AXIS: 68 DEGREES
POTASSIUM SERPL-SCNC: 4.1 MMOL/L (ref 3.5–5.3)
PR INTERVAL: 162 MS
PROT SERPL-MCNC: 6.1 G/DL (ref 6.4–8.4)
QRS AXIS: 52 DEGREES
QRSD INTERVAL: 94 MS
QT INTERVAL: 382 MS
QTC INTERVAL: 469 MS
SODIUM SERPL-SCNC: 143 MMOL/L (ref 135–147)
T WAVE AXIS: -28 DEGREES
VENTRICULAR RATE: 91 BPM

## 2023-08-02 PROCEDURE — 36415 COLL VENOUS BLD VENIPUNCTURE: CPT

## 2023-08-02 PROCEDURE — 93010 ELECTROCARDIOGRAM REPORT: CPT | Performed by: INTERNAL MEDICINE

## 2023-08-02 PROCEDURE — 80053 COMPREHEN METABOLIC PANEL: CPT

## 2023-08-02 PROCEDURE — 71046 X-RAY EXAM CHEST 2 VIEWS: CPT

## 2023-08-02 PROCEDURE — 93005 ELECTROCARDIOGRAM TRACING: CPT

## 2023-08-09 ENCOUNTER — APPOINTMENT (OUTPATIENT)
Dept: LAB | Facility: HOSPITAL | Age: 78
End: 2023-08-09
Payer: COMMERCIAL

## 2023-08-09 DIAGNOSIS — C61 PROSTATE CANCER (HCC): ICD-10-CM

## 2023-08-09 LAB
PSA SERPL-MCNC: 5.53 NG/ML (ref 0–4)
TESTOST SERPL-MSCNC: 17 NG/DL

## 2023-08-09 PROCEDURE — 84403 ASSAY OF TOTAL TESTOSTERONE: CPT

## 2023-08-09 PROCEDURE — 84153 ASSAY OF PSA TOTAL: CPT

## 2023-08-09 PROCEDURE — 36415 COLL VENOUS BLD VENIPUNCTURE: CPT

## 2023-08-10 DIAGNOSIS — C61 PROSTATE CANCER (HCC): Primary | ICD-10-CM

## 2023-08-11 DIAGNOSIS — J44.1 COPD EXACERBATION (HCC): Primary | ICD-10-CM

## 2023-08-11 RX ORDER — AMOXICILLIN AND CLAVULANATE POTASSIUM 875; 125 MG/1; MG/1
1 TABLET, FILM COATED ORAL EVERY 12 HOURS SCHEDULED
Qty: 14 TABLET | Refills: 0 | Status: SHIPPED | OUTPATIENT
Start: 2023-08-11 | End: 2023-08-19

## 2023-08-11 RX ORDER — PREDNISONE 10 MG/1
TABLET ORAL
Qty: 30 TABLET | Refills: 0 | Status: ON HOLD | OUTPATIENT
Start: 2023-08-11 | End: 2023-08-19 | Stop reason: SDUPTHER

## 2023-08-13 ENCOUNTER — APPOINTMENT (EMERGENCY)
Dept: RADIOLOGY | Facility: HOSPITAL | Age: 78
DRG: 871 | End: 2023-08-13
Payer: COMMERCIAL

## 2023-08-13 ENCOUNTER — HOSPITAL ENCOUNTER (EMERGENCY)
Facility: HOSPITAL | Age: 78
Discharge: HOME | DRG: 871 | End: 2023-08-13
Attending: INTERNAL MEDICINE
Payer: COMMERCIAL

## 2023-08-13 ENCOUNTER — APPOINTMENT (EMERGENCY)
Dept: CT IMAGING | Facility: HOSPITAL | Age: 78
DRG: 871 | End: 2023-08-13
Payer: COMMERCIAL

## 2023-08-13 VITALS
RESPIRATION RATE: 22 BRPM | DIASTOLIC BLOOD PRESSURE: 54 MMHG | BODY MASS INDEX: 29.04 KG/M2 | HEIGHT: 68 IN | OXYGEN SATURATION: 98 % | SYSTOLIC BLOOD PRESSURE: 99 MMHG | HEART RATE: 100 BPM

## 2023-08-13 DIAGNOSIS — K21.9 GASTROESOPHAGEAL REFLUX DISEASE, UNSPECIFIED WHETHER ESOPHAGITIS PRESENT: ICD-10-CM

## 2023-08-13 DIAGNOSIS — R07.89 ATYPICAL CHEST PAIN: Primary | ICD-10-CM

## 2023-08-13 LAB
2HR DELTA HS TROPONIN: 0 NG/L
ALBUMIN SERPL BCP-MCNC: 4 G/DL (ref 3.5–5)
ALP SERPL-CCNC: 55 U/L (ref 34–104)
ALT SERPL W P-5'-P-CCNC: 20 U/L (ref 7–52)
ANION GAP SERPL CALCULATED.3IONS-SCNC: 8 MMOL/L
AST SERPL W P-5'-P-CCNC: 23 U/L (ref 13–39)
BASOPHILS # BLD AUTO: 0.02 THOUSANDS/ÂΜL (ref 0–0.1)
BASOPHILS NFR BLD AUTO: 0 % (ref 0–1)
BILIRUB SERPL-MCNC: 0.42 MG/DL (ref 0.2–1)
BNP SERPL-MCNC: 583 PG/ML (ref 0–100)
BUN SERPL-MCNC: 30 MG/DL (ref 5–25)
CALCIUM SERPL-MCNC: 8.8 MG/DL (ref 8.4–10.2)
CARDIAC TROPONIN I PNL SERPL HS: 39 NG/L
CARDIAC TROPONIN I PNL SERPL HS: 39 NG/L
CHLORIDE SERPL-SCNC: 100 MMOL/L (ref 96–108)
CO2 SERPL-SCNC: 31 MMOL/L (ref 21–32)
CREAT SERPL-MCNC: 1.18 MG/DL (ref 0.6–1.3)
D DIMER PPP FEU-MCNC: 0.6 UG/ML FEU
EOSINOPHIL # BLD AUTO: 0.01 THOUSAND/ÂΜL (ref 0–0.61)
EOSINOPHIL NFR BLD AUTO: 0 % (ref 0–6)
ERYTHROCYTE [DISTWIDTH] IN BLOOD BY AUTOMATED COUNT: 13.9 % (ref 11.6–15.1)
GFR SERPL CREATININE-BSD FRML MDRD: 58 ML/MIN/1.73SQ M
GLUCOSE SERPL-MCNC: 134 MG/DL (ref 65–140)
HCT VFR BLD AUTO: 44 % (ref 36.5–49.3)
HGB BLD-MCNC: 13.3 G/DL (ref 12–17)
IMM GRANULOCYTES # BLD AUTO: 0.07 THOUSAND/UL (ref 0–0.2)
IMM GRANULOCYTES NFR BLD AUTO: 1 % (ref 0–2)
LYMPHOCYTES # BLD AUTO: 0.55 THOUSANDS/ÂΜL (ref 0.6–4.47)
LYMPHOCYTES NFR BLD AUTO: 4 % (ref 14–44)
MCH RBC QN AUTO: 30.7 PG (ref 26.8–34.3)
MCHC RBC AUTO-ENTMCNC: 30.2 G/DL (ref 31.4–37.4)
MCV RBC AUTO: 102 FL (ref 82–98)
MONOCYTES # BLD AUTO: 0.88 THOUSAND/ÂΜL (ref 0.17–1.22)
MONOCYTES NFR BLD AUTO: 6 % (ref 4–12)
NEUTROPHILS # BLD AUTO: 13.02 THOUSANDS/ÂΜL (ref 1.85–7.62)
NEUTS SEG NFR BLD AUTO: 89 % (ref 43–75)
NRBC BLD AUTO-RTO: 0 /100 WBCS
PLATELET # BLD AUTO: 216 THOUSANDS/UL (ref 149–390)
PMV BLD AUTO: 10.8 FL (ref 8.9–12.7)
POTASSIUM SERPL-SCNC: 4.3 MMOL/L (ref 3.5–5.3)
PROT SERPL-MCNC: 5.8 G/DL (ref 6.4–8.4)
RBC # BLD AUTO: 4.33 MILLION/UL (ref 3.88–5.62)
SARS-COV-2 RNA RESP QL NAA+PROBE: NEGATIVE
SODIUM SERPL-SCNC: 139 MMOL/L (ref 135–147)
WBC # BLD AUTO: 14.55 THOUSAND/UL (ref 4.31–10.16)

## 2023-08-13 PROCEDURE — 99285 EMERGENCY DEPT VISIT HI MDM: CPT

## 2023-08-13 PROCEDURE — 83880 ASSAY OF NATRIURETIC PEPTIDE: CPT | Performed by: INTERNAL MEDICINE

## 2023-08-13 PROCEDURE — NC001 PR NO CHARGE: Performed by: INTERNAL MEDICINE

## 2023-08-13 PROCEDURE — C9113 INJ PANTOPRAZOLE SODIUM, VIA: HCPCS | Performed by: INTERNAL MEDICINE

## 2023-08-13 PROCEDURE — 93005 ELECTROCARDIOGRAM TRACING: CPT

## 2023-08-13 PROCEDURE — 85025 COMPLETE CBC W/AUTO DIFF WBC: CPT | Performed by: INTERNAL MEDICINE

## 2023-08-13 PROCEDURE — 80053 COMPREHEN METABOLIC PANEL: CPT | Performed by: INTERNAL MEDICINE

## 2023-08-13 PROCEDURE — 71275 CT ANGIOGRAPHY CHEST: CPT

## 2023-08-13 PROCEDURE — 71045 X-RAY EXAM CHEST 1 VIEW: CPT

## 2023-08-13 PROCEDURE — 84484 ASSAY OF TROPONIN QUANT: CPT | Performed by: INTERNAL MEDICINE

## 2023-08-13 PROCEDURE — G1004 CDSM NDSC: HCPCS

## 2023-08-13 PROCEDURE — 87635 SARS-COV-2 COVID-19 AMP PRB: CPT | Performed by: INTERNAL MEDICINE

## 2023-08-13 PROCEDURE — 96374 THER/PROPH/DIAG INJ IV PUSH: CPT

## 2023-08-13 PROCEDURE — 85379 FIBRIN DEGRADATION QUANT: CPT | Performed by: INTERNAL MEDICINE

## 2023-08-13 PROCEDURE — 36415 COLL VENOUS BLD VENIPUNCTURE: CPT | Performed by: INTERNAL MEDICINE

## 2023-08-13 PROCEDURE — 99285 EMERGENCY DEPT VISIT HI MDM: CPT | Performed by: INTERNAL MEDICINE

## 2023-08-13 RX ORDER — SUCRALFATE 1 G/1
1 TABLET ORAL ONCE
Status: COMPLETED | OUTPATIENT
Start: 2023-08-13 | End: 2023-08-13

## 2023-08-13 RX ORDER — PANTOPRAZOLE SODIUM 40 MG/10ML
40 INJECTION, POWDER, LYOPHILIZED, FOR SOLUTION INTRAVENOUS ONCE
Status: COMPLETED | OUTPATIENT
Start: 2023-08-13 | End: 2023-08-13

## 2023-08-13 RX ORDER — OMEPRAZOLE 20 MG/1
20 CAPSULE, DELAYED RELEASE ORAL DAILY
Qty: 28 CAPSULE | Refills: 0 | Status: SHIPPED | OUTPATIENT
Start: 2023-08-13

## 2023-08-13 RX ADMIN — SUCRALFATE 1 G: 1 TABLET ORAL at 20:00

## 2023-08-13 RX ADMIN — IOHEXOL 85 ML: 350 INJECTION, SOLUTION INTRAVENOUS at 17:30

## 2023-08-13 RX ADMIN — PANTOPRAZOLE SODIUM 40 MG: 40 INJECTION, POWDER, FOR SOLUTION INTRAVENOUS at 19:59

## 2023-08-13 NOTE — ED PROVIDER NOTES
History  Chief Complaint   Patient presents with   • Chest Pain      Pt reports chest pain starting last night. Pt has a cough currently pt took 1.5 nitro without relief. 59-year-old male presents with 24 hours plus of intermittent chest pain radiating across precordium occasionally down his left arm. Patient with increasing shortness of breath with even minimal exertion. Patient has a extensive past medical history including coronary artery disease with 10 stents placed his last MI was roughly 15 years ago, he is also had a carotid endarterectomy, COPD on 3 L of nasal cannula at home. Patient states for the past several days week he has been having chest congestion with a cough productive of clear to yellow sputum, patient saw his PCP Dr. Bryce Salinas and was placed on prednisone and amoxicillin but his symptoms seem to have worsened. He denies any fever. Patient has had 3 COVID vaccines and had had COVID a couple of years ago. Patient was seen by his PCP approximately a week ago and started on amoxicillin and prednisone for his cough. Patient was also seen by his cardiologist Dr. Vergara Bodily 3 to 4 weeks ago has a follow-up appointment at the end of this month. Prior to Admission Medications   Prescriptions Last Dose Informant Patient Reported? Taking?    ALPRAZolam (XANAX) 0.25 mg tablet  Spouse/Significant Other No No   Sig: Take 1 tablet (0.25 mg total) by mouth 2 (two) times a day as needed for anxiety   Patient not taking: Reported on 2023   HYDROcodone-acetaminophen (Norco) 5-325 mg per tablet  Spouse/Significant Other No No   Sig: Take 1 tablet by mouth every 6 (six) hours as needed for pain Max Daily Amount: 4 tablets   amoxicillin-clavulanate (AUGMENTIN) 875-125 mg per tablet   No No   Sig: Take 1 tablet by mouth every 12 (twelve) hours for 7 days   aspirin 81 mg chewable tablet  Spouse/Significant Other Yes No   Si mg daily at bedtime   azithromycin (ZITHROMAX) 250 mg tablet Spouse/Significant Other Yes No   Sig: Take 500 mg by mouth 3 (three) times a week Monday, Wednesday, Friday   budesonide (PULMICORT) 0.5 mg/2 mL nebulizer solution  Spouse/Significant Other No No   Sig: Take 2 mL (0.5 mg total) by nebulization 2 (two) times a day Rinse mouth after use. dextromethorphan-guaiFENesin (ROBITUSSIN DM)  mg/5 mL syrup  Spouse/Significant Other No No   Sig: Take 5 mL by mouth 3 (three) times a day as needed for cough   famotidine (PEPCID) 20 mg tablet  Spouse/Significant Other No No   Sig: TAKE 1 TABLET BY MOUTH DAILY AT  BEDTIME   formoterol (PERFOROMIST) 20 MCG/2ML nebulizer solution  Spouse/Significant Other No No   Sig: Take 2 mL (20 mcg total) by nebulization 2 (two) times a day   furosemide (LASIX) 40 mg tablet  Spouse/Significant Other No No   Sig: Take 1 tablet (40 mg total) by mouth daily as needed (weight gain more than 3 lbs in 24 hours or worsening lower extremities edema)   gabapentin (NEURONTIN) 300 mg capsule  Spouse/Significant Other No No   Sig: Take 1 capsule (300 mg total) by mouth daily at bedtime   methocarbamol (ROBAXIN) 500 mg tablet  Spouse/Significant Other No No   Sig: Take 1 tablet (500 mg total) by mouth 3 (three) times a day as needed for muscle spasms   metoprolol succinate (TOPROL-XL) 25 mg 24 hr tablet  Spouse/Significant Other No No   Sig: Take 0.5 tablets (12.5 mg total) by mouth daily   naloxone (NARCAN) 4 mg/0.1 mL nasal spray  Spouse/Significant Other No No   Sig: Administer 1 spray into a nostril. If no response after 2-3 minutes, give another dose in the other nostril using a new spray.    oxygen gas  Spouse/Significant Other Yes No   Sig: Inhale 2 L/min continuous 2LPM at rest and 3-4 LPM with activity per D Cedric FANG notes   potassium chloride (K-DUR,KLOR-CON) 20 mEq tablet  Spouse/Significant Other No No   Sig: Take 1 tablet (20 mEq total) by mouth daily   predniSONE 10 mg tablet   No No   Sig: Take 4 tablets (40 mg total) by mouth daily for 3 days, THEN 3 tablets (30 mg total) daily for 3 days, THEN 2 tablets (20 mg total) daily for 3 days, THEN 1 tablet (10 mg total) daily for 3 days.    rosuvastatin (CRESTOR) 40 MG tablet  Spouse/Significant Other No No   Sig: TAKE 1 TABLET DAILY   Patient taking differently: Take 40 mg by mouth daily at bedtime      Facility-Administered Medications: None       Past Medical History:   Diagnosis Date   • Abnormal ECG 2021 OR 2022   • Alcoholism ([address]) 1984    sober 38 years   • Arthritis 2008    beginning   • Bilateral carotid artery stenosis    • Callus    • Cancer (HCC)     skin   • Chronic diastolic heart failure (HCC)    • Chronic ischemic heart disease    • Chronic kidney disease     stage 3   • Colon polyp    • COPD (chronic obstructive pulmonary disease) (HCC)    • Coronary artery disease     hx stents, MI, PCI   • COVID 11/2021   • CPAP (continuous positive airway pressure) dependence    • Disease of thyroid gland 2017    nodules   • Emphysema of lung ([address]) 1995    started   • Hearing loss    • History of transfusion 1995   • Hyperlipidemia    • Hypertension    • Intracranial aneurysm 04/25/2023   • Lung nodule 2023    x rays   • MI (myocardial infarction) ([address]) 1995   • Myocardial infarction ([address]) 1995   • Pneumonia    • Pneumothorax 02/20/2023    collapsed lung   • Prostate cancer ([address])    • RSV (respiratory syncytial virus infection) 10/2022   • S/P carotid endarterectomy    • Shortness of breath     O2 2 l/nc PRN   • Sleep apnea    • Sleep apnea, obstructive    • Stented coronary artery        Past Surgical History:   Procedure Laterality Date   • APPENDECTOMY     • CARDIAC CATHETERIZATION  03/18/2021    left main with no significant disease, proximal LAD with 10% stenosis at the site of prior stent, left circumflex artery with minimal luminal irregularities mid RCA with 50% stenosis at site of prior stent and PL segment with distal disease supplied by collaterals from the distal circumflex with no significant CAD requiring revascularization at that time. • CATARACT EXTRACTION, BILATERAL Bilateral    • COLONOSCOPY     • CORONARY ANGIOPLASTY WITH STENT PLACEMENT      RCA   • CORONARY ANGIOPLASTY WITH STENT PLACEMENT      RCA   • CORONARY ANGIOPLASTY WITH STENT PLACEMENT      LAD   • EYE SURGERY     • WA BX PROSTATE STRTCTC SATURATION SAMPLING IMG GID N/A 2022    Procedure: TRANSPERINEAL MRI FUSION  BIOPSY PROSTATE;  Surgeon: Vlad Roman MD;  Location: [facility];  Service: Urology   • WA NEUROPLASTY &/TRANSPOS MEDIAN NRV Capri Casillask Left 2022    Procedure: RELEASE CARPAL TUNNEL;  Surgeon: Mary Lay MD;  Location: [facility];  Service: Orthopedics   • WA NEUROPLASTY &/TRANSPOSITION ULNAR NERVE ELBOW Left 2022    Procedure: RELEASE CUBITAL TUNNEL;  Surgeon: Mary Lay MD;  Location[facility];  Service: Orthopedics   • WA NEUROPLASTY &/TRANSPOSITION ULNAR NERVE WRIST Left 2022    Procedure: Chriss Surya;  Surgeon: Mary Lay MD;  Location: [facility];  Service: Orthopedics   • SKIN CANCER EXCISION      chin-per pt, basal cell  also right ankle   • TONSILLECTOMY  no       Family History   Problem Relation Age of Onset   • Heart attack Mother    • Dementia Mother    • Heart failure Mother             • Heart disease Mother         heart attacks (2)   • No Known Problems Father      I have reviewed and agree with the history as documented.     E-Cigarette/Vaping   • E-Cigarette Use Never User      E-Cigarette/Vaping Substances   • Nicotine No    • THC No    • CBD No    • Flavoring No    • Other No    • Unknown No      Social History     Tobacco Use   • Smoking status: Former     Packs/day: 2.00     Years: 35.00     Total pack years: 70.00     Types: Cigarettes     Start date: 1960     Quit date: 1995     Years since quittin.1   • Smokeless tobacco: Never   • Tobacco comments:     stopped smoking the day i had a heart attack   Vaping Use   • Vaping Use: Never used   Substance Use Topics   • Alcohol use: Not Currently   • Drug use: Never       Review of Systems   Constitutional: Negative. HENT: Negative. Eyes: Negative. Respiratory: Positive for cough, chest tightness, shortness of breath and wheezing. Negative for apnea, choking and stridor. Cardiovascular: Positive for chest pain. Negative for palpitations and leg swelling. Gastrointestinal: Negative. Genitourinary: Negative. Musculoskeletal: Negative. Skin: Negative. Neurological: Negative. Hematological: Negative. Psychiatric/Behavioral: Negative. Physical Exam  Physical Exam  Vitals and nursing note reviewed. Constitutional:       General: He is not in acute distress. Appearance: He is well-developed. He is not ill-appearing, toxic-appearing or diaphoretic. HENT:      Head: Normocephalic and atraumatic. Eyes:      Extraocular Movements: Extraocular movements intact. Pupils: Pupils are equal, round, and reactive to light. Cardiovascular:      Rate and Rhythm: Normal rate and regular rhythm. Heart sounds: Normal heart sounds. Pulmonary:      Effort: No tachypnea or accessory muscle usage. Breath sounds: No stridor. Decreased breath sounds and rhonchi present. No rales. Chest:      Chest wall: Edema present. No mass, deformity, tenderness or crepitus. There is no dullness to percussion. Comments: Trace bilateral pitting pretibial  Abdominal:      General: Bowel sounds are normal.      Palpations: Abdomen is soft. Musculoskeletal:      Cervical back: Normal range of motion and neck supple. Right lower leg: No tenderness. Edema present. Left lower leg: No tenderness. Edema present. Skin:     General: Skin is warm and dry. Capillary Refill: Capillary refill takes less than 2 seconds. Neurological:      General: No focal deficit present. Mental Status: He is alert.    Psychiatric: Mood and Affect: Mood normal.         Behavior: Behavior normal.         Vital Signs  ED Triage Vitals [08/13/23 1441]   Temp Pulse Respirations Blood Pressure SpO2   -- 100 22 99/54 98 %      Temp src Heart Rate Source Patient Position - Orthostatic VS BP Location FiO2 (%)   -- Monitor Sitting Right arm --      Pain Score       4           Vitals:    08/13/23 1441   BP: 99/54   Pulse: 100   Patient Position - Orthostatic VS: Sitting         Visual Acuity      ED Medications  Medications   pantoprazole (PROTONIX) injection 40 mg (has no administration in time range)   sucralfate (CARAFATE) tablet 1 g (has no administration in time range)   iohexol (OMNIPAQUE) 350 MG/ML injection (SINGLE-DOSE) 85 mL (85 mL Intravenous Given 8/13/23 1730)       Diagnostic Studies  Results Reviewed     Procedure Component Value Units Date/Time    HS Troponin I 2hr [[id number]]  (Normal) Collected: 08/13/23 1713    Lab Status: Final result Specimen: Blood from Arm, Right Updated: 08/13/23 1742     hs TnI 2hr 39 ng/L      Delta 2hr hsTnI 0 ng/L     COVID only [[id number]]  (Normal) Collected: 08/13/23 1606    Lab Status: Final result Specimen: Nares from Nose Updated: 08/13/23 1737     SARS-CoV-2 Negative    Narrative:      FOR PEDIATRIC PATIENTS - copy/paste COVID Guidelines URL to browser: [email or web address]    SARS-CoV-2 assay is a Nucleic Acid Amplification assay intended for the  qualitative detection of nucleic acid from SARS-CoV-2 in nasopharyngeal  swabs. Results are for the presumptive identification of SARS-CoV-2 RNA. Positive results are indicative of infection with SARS-CoV-2, the virus  causing COVID-19, but do not rule out bacterial infection or co-infection  with other viruses. Laboratories within the [facility] and its  territories are required to report all positive results to the appropriate  public health authorities.  Negative results do not preclude SARS-CoV-2  infection and should not be used as the sole basis for treatment or other  patient management decisions. Negative results must be combined with  clinical observations, patient history, and epidemiological information. This test has not been FDA cleared or approved. This test has been authorized by FDA under an Emergency Use Authorization  (EUA). This test is only authorized for the duration of time the  declaration that circumstances exist justifying the authorization of the  emergency use of an in vitro diagnostic tests for detection of SARS-CoV-2  virus and/or diagnosis of COVID-19 infection under section 564(b)(1) of  the Act, 21 U. S.C. 290OEQ-5(P)(2), unless the authorization is terminated  or revoked sooner. The test has been validated but independent review by FDA  and CLIA is pending. Test performed using [employer] GeneXpert: This RT-PCR assay targets N2,  a region unique to SARS-CoV-2. A conserved region in the E-gene was chosen  for pan-Sarbecovirus detection which includes SARS-CoV-2. According to CMS-2020-01-R, this platform meets the definition of high-throughput technology. B-Type Natriuretic Peptide(BNP) [[id number]]  (Abnormal) Collected: 08/13/23 1606    Lab Status: Final result Specimen: Blood from Arm, Right Updated: 08/13/23 1642      pg/mL     D-dimer, quantitative [[id number]]  (Abnormal) Collected: 08/13/23 1606    Lab Status: Final result Specimen: Blood from Arm, Right Updated: 08/13/23 1623     D-Dimer, Quant 0.60 ug/ml FEU     Narrative: In the evaluation for possible pulmonary embolism, in the appropriate (Well's Score of 4 or less) patient, the age adjusted d-dimer cutoff for this patient can be calculated as:    Age x 0.01 (in ug/mL) for Age-adjusted D-dimer exclusion threshold for a patient over 50 years.     HS Troponin 0hr (reflex protocol) [[id number]]  (Normal) Collected: 08/13/23 2765    Lab Status: Final result Specimen: Blood from Arm, Right Updated: 08/13/23 1532     hs TnI 0hr 39 ng/L     Comprehensive metabolic panel [[id number]]  (Abnormal) Collected: 08/13/23 1459    Lab Status: Final result Specimen: Blood from Arm, Right Updated: 08/13/23 1524     Sodium 139 mmol/L      Potassium 4.3 mmol/L      Chloride 100 mmol/L      CO2 31 mmol/L      ANION GAP 8 mmol/L      BUN 30 mg/dL      Creatinine 1.18 mg/dL      Glucose 134 mg/dL      Calcium 8.8 mg/dL      AST 23 U/L      ALT 20 U/L      Alkaline Phosphatase 55 U/L      Total Protein 5.8 g/dL      Albumin 4.0 g/dL      Total Bilirubin 0.42 mg/dL      eGFR 58 ml/min/1.73sq m     Narrative:      [place] guidelines for Chronic Kidney Disease (CKD):   •  Stage 1 with normal or high GFR (GFR > 90 mL/min/1.73 square meters)  •  Stage 2 Mild CKD (GFR = 60-89 mL/min/1.73 square meters)  •  Stage 3A Moderate CKD (GFR = 45-59 mL/min/1.73 square meters)  •  Stage 3B Moderate CKD (GFR = 30-44 mL/min/1.73 square meters)  •  Stage 4 Severe CKD (GFR = 15-29 mL/min/1.73 square meters)  •  Stage 5 End Stage CKD (GFR <15 mL/min/1.73 square meters)  Note: GFR calculation is accurate only with a steady state creatinine    CBC and differential [[id number]]  (Abnormal) Collected: 08/13/23 1459    Lab Status: Final result Specimen: Blood from Arm, Right Updated: 08/13/23 1505     WBC 14.55 Thousand/uL      RBC 4.33 Million/uL      Hemoglobin 13.3 g/dL      Hematocrit 44.0 %       fL      MCH 30.7 pg      MCHC 30.2 g/dL      RDW 13.9 %      MPV 10.8 fL      Platelets 032 Thousands/uL      nRBC 0 /100 WBCs      Neutrophils Relative 89 %      Immat GRANS % 1 %      Lymphocytes Relative 4 %      Monocytes Relative 6 %      Eosinophils Relative 0 %      Basophils Relative 0 %      Neutrophils Absolute 13.02 Thousands/µL      Immature Grans Absolute 0.07 Thousand/uL      Lymphocytes Absolute 0.55 Thousands/µL      Monocytes Absolute 0.88 Thousand/µL      Eosinophils Absolute 0.01 Thousand/µL Basophils Absolute 0.02 Thousands/µL                  CTA ED chest PE Study   Final Result by Amie Gómez MD (08/13 1857)      1. No acute pulmonary embolism. 2.  Increased right greater than left pleural effusions. 3.  Bibasilar dependent patchy opacity with calcification, right greater than left, likely chronic atelectasis. Additional opacities seen on the prior study have improved or resolved. 4.  New right hilar and subcarinal lymphadenopathy, likely reactive, though short interval follow-up CT in 3 months is recommended to ensure resolution. 5.  New broncholith within the left mainstem bronchus, likely calcification of aspirated material as there was no adjacent calcification on the prior CT to indicate erosion. 6.  Severe centrilobular emphysema. The study was marked in [facility] for immediate notification. Workstation performed: [id number]         XR chest 1 view portable    (Results Pending)              Procedures  ECG 12 Lead Documentation Only    Date/Time: 8/13/2023 5:10 PM    Performed by: Tequila Gabriel MD  Authorized by: Tequila Gabriel MD    Indications / Diagnosis:  Chest pain and shortness of breath  ECG reviewed by me, the ED Provider: yes    Patient location:  ED  Previous ECG:     Previous ECG:  Compared to current    Similarity:  No change    Comparison to cardiac monitor: Yes    Interpretation:     Interpretation: abnormal    Rate:     ECG rate:  100    ECG rate assessment: tachycardic    Rhythm:     Rhythm: sinus rhythm    Ectopy:     Ectopy: PVCs    QRS:     QRS axis:  Normal  Conduction:     Conduction: normal    ST segments:     ST segments:  Normal  T waves:     T waves: normal    Comments:      EKG normal sinus rhythm normal IN interval, rate between 95 and 100. No ST elevation is appreciated, nonspecific T wave changes. Normal QRS complexes normal QT PVCs are present no evidence of ischemia.              ED Course             HEART Risk Score Flowsheet Row Most Recent Value   Heart Score Risk Calculator    History 1 Filed at: 08/13/2023 1938   ECG 1 Filed at: 08/13/2023 1938   Age 2 Filed at: 08/13/2023 1938   Risk Factors 2 Filed at: 08/13/2023 1938   Troponin 0 Filed at: 08/13/2023 1938   HEART Score 6 Filed at: 08/13/2023 1938                        SBIRT 22yo+    Flowsheet Row Most Recent Value   Initial Alcohol Screen: US AUDIT-C     1. How often do you have a drink containing alcohol? 0 Filed at: 08/13/2023 1446   2. How many drinks containing alcohol do you have on a typical day you are drinking? 0 Filed at: 08/13/2023 1446   3a. Male UNDER 65: How often do you have five or more drinks on one occasion?  0 Filed at: 08/13/2023 1446   Audit-C Score 0 Filed at: 08/13/2023 1446          Wells' Criteria for PE    Flowsheet Row Most Recent Value   Wells' Criteria for PE    Clinical signs and symptoms of DVT 0 Filed at: 08/13/2023 1709   PE is primary diagnosis or equally likely 0 Filed at: 08/13/2023 1709   HR >100 0 Filed at: 08/13/2023 1709   Immobilization at least 3 days or Surgery in the previous 4 weeks 0 Filed at: 08/13/2023 1709   Previous, objectively diagnosed PE or DVT 1.5 Filed at: 08/13/2023 1709   Hemoptysis 0 Filed at: 08/13/2023 1709   Malignancy with treatment within 6 months or palliative 0 Filed at: 08/13/2023 1709   Wells' Criteria Total 1.5 Filed at: 08/13/2023 1709        Wells' Criteria for DVT    Flowsheet Row Most Recent Value   Wells' Criteria for DVT    Active cancer Treatment or palliation within 6 months 0 Filed at: 08/13/2023 1709   Bedridden recently >3 days or major surgery within 12 weeks 0 Filed at: 08/13/2023 1709   Calf swelling >3 cm compared to the other leg 0 Filed at: 08/13/2023 1709   Entire leg swollen 0 Filed at: 08/13/2023 1709   Collateral (nonvaricose) superficial veins present 0 Filed at: 08/13/2023 1709   Localized tenderness along the deep venous system 0 Filed at: 08/13/2023 1709   Pitting edema, confined to symptomatic leg 1 Filed at: 08/13/2023 1709   Paralysis, paresis, or recent plaster immobilization of the lower extremity 0 Filed at: 08/13/2023 1709   Previously documented DVT 0 Filed at: 08/13/2023 1709   Alternative diagnosis to DVT as likely or more likely 0 Filed at: 08/13/2023 1709   Wells DVT Critera Score 1 Filed at: 08/13/2023 1709              Medical Decision Making  16-year-old male presented with increasing shortness of breath. Patient has a history of severe emphysema, coronary artery disease, current pneumonia. Patient is also on 3 L of nasal cannula at home. At the time of discharge she is clinically stable, he feels well he would like to go home as would his wife would like to bring him home. His biggest complaint at this time is epigastric pain when he swallows, this may be secondary to being on prednisone and Augmentin. Patient was given Protonix IV in the emergency room as well as Carafate will be discharged home on Protonix for 4 weeks follow-up with his PCP Dr. Mirta Esqueda in 2 to 3 days    Amount and/or Complexity of Data Reviewed  Labs: ordered. Radiology: ordered. Risk  Prescription drug management. Disposition  Final diagnoses:   Atypical chest pain   Gastroesophageal reflux disease, unspecified whether esophagitis present     Time reflects when diagnosis was documented in both MDM as applicable and the Disposition within this note     Time User Action Codes Description Comment    8/13/2023  7:39 PM Oleg An Add [R07.89] Atypical chest pain     8/13/2023  7:39 PM Oleg An Add [K21.9] Gastroesophageal reflux disease, unspecified whether esophagitis present       ED Disposition     ED Disposition   Discharge    Condition   Stable    Date/Time   Sun Aug 13, 2023  7:41 PM    Comment   Deshawn Isabel discharge to home/self care.                Follow-up Information     Follow up With Specialties Details Why Contact DO Maurice [facility] In 3 days  [address] [facility] [place]  [phone]            Patient's Medications   Discharge Prescriptions    OMEPRAZOLE (PRILOSEC) 20 MG DELAYED RELEASE CAPSULE    Take 1 capsule (20 mg total) by mouth daily       Start Date: 8/13/2023 End Date: --       Order Dose: 20 mg       Quantity: 28 capsule    Refills: 0       No discharge procedures on file.     PDMP Review       Value Time User    PDMP Reviewed  Yes 6/30/2023  5:21 PM Stephanie Headings, DO          ED Provider  Electronically Signed by           Karol Wang MD  08/13/23 1711       Karol Wang MD  08/13/23 [id number]       Karol Wang MD  08/13/23 1173

## 2023-08-14 LAB
ATRIAL RATE: 100 BPM
ATRIAL RATE: 104 BPM
ATRIAL RATE: 97 BPM
P AXIS: 72 DEGREES
P AXIS: 72 DEGREES
P AXIS: 74 DEGREES
PR INTERVAL: 156 MS
PR INTERVAL: 158 MS
PR INTERVAL: 162 MS
QRS AXIS: 73 DEGREES
QRSD INTERVAL: 94 MS
QRSD INTERVAL: 96 MS
QRSD INTERVAL: 98 MS
QT INTERVAL: 350 MS
QT INTERVAL: 350 MS
QT INTERVAL: 362 MS
QTC INTERVAL: 451 MS
QTC INTERVAL: 459 MS
QTC INTERVAL: 460 MS
T WAVE AXIS: -23 DEGREES
T WAVE AXIS: -24 DEGREES
T WAVE AXIS: -30 DEGREES
VENTRICULAR RATE: 100 BPM
VENTRICULAR RATE: 104 BPM
VENTRICULAR RATE: 97 BPM

## 2023-08-14 PROCEDURE — 93010 ELECTROCARDIOGRAM REPORT: CPT | Performed by: INTERNAL MEDICINE

## 2023-08-16 ENCOUNTER — HOSPITAL ENCOUNTER (INPATIENT)
Facility: HOSPITAL | Age: 78
LOS: 3 days | Discharge: HOME HEALTH | DRG: 871 | End: 2023-08-19
Attending: INTERNAL MEDICINE | Admitting: INTERNAL MEDICINE
Payer: COMMERCIAL

## 2023-08-16 ENCOUNTER — APPOINTMENT (EMERGENCY)
Dept: RADIOLOGY | Facility: HOSPITAL | Age: 78
DRG: 871 | End: 2023-08-16
Payer: COMMERCIAL

## 2023-08-16 ENCOUNTER — OFFICE VISIT (OUTPATIENT)
Dept: FAMILY MEDICINE CLINIC | Facility: CLINIC | Age: 78
End: 2023-08-16
Payer: COMMERCIAL

## 2023-08-16 VITALS
HEIGHT: 68 IN | BODY MASS INDEX: 29.04 KG/M2 | TEMPERATURE: 98.1 F | HEART RATE: 84 BPM | OXYGEN SATURATION: 98 % | SYSTOLIC BLOOD PRESSURE: 108 MMHG | DIASTOLIC BLOOD PRESSURE: 60 MMHG

## 2023-08-16 DIAGNOSIS — I25.10 CORONARY ARTERY DISEASE INVOLVING NATIVE CORONARY ARTERY OF NATIVE HEART WITHOUT ANGINA PECTORIS: Chronic | ICD-10-CM

## 2023-08-16 DIAGNOSIS — J44.1 CHRONIC OBSTRUCTIVE PULMONARY DISEASE WITH ACUTE EXACERBATION (HCC): Primary | ICD-10-CM

## 2023-08-16 DIAGNOSIS — A41.9 SEPSIS DUE TO PNEUMONIA (HCC): ICD-10-CM

## 2023-08-16 DIAGNOSIS — J90 PLEURAL EFFUSION: ICD-10-CM

## 2023-08-16 DIAGNOSIS — R59.0 HILAR LYMPHADENOPATHY: ICD-10-CM

## 2023-08-16 DIAGNOSIS — J44.1 COPD EXACERBATION (HCC): Primary | ICD-10-CM

## 2023-08-16 DIAGNOSIS — J18.9 SEPSIS DUE TO PNEUMONIA (HCC): ICD-10-CM

## 2023-08-16 PROBLEM — H91.21 SUDDEN IDIOPATHIC HEARING LOSS, RIGHT EAR: Status: ACTIVE | Noted: 2023-04-17

## 2023-08-16 PROBLEM — J30.9 ALLERGIC RHINITIS, UNSPECIFIED: Status: ACTIVE | Noted: 2023-04-17

## 2023-08-16 PROBLEM — R26.89 OTHER ABNORMALITIES OF GAIT AND MOBILITY: Status: ACTIVE | Noted: 2023-04-17

## 2023-08-16 PROBLEM — N18.31 CHRONIC KIDNEY DISEASE, STAGE 3A (HCC): Status: ACTIVE | Noted: 2023-04-17

## 2023-08-16 PROBLEM — N52.9 ERECTILE DYSFUNCTION: Status: ACTIVE | Noted: 2023-08-16

## 2023-08-16 PROBLEM — M62.81 GENERALIZED MUSCLE WEAKNESS: Status: ACTIVE | Noted: 2023-04-17

## 2023-08-16 PROBLEM — Z74.1 NEED FOR ASSISTANCE WITH PERSONAL CARE: Status: ACTIVE | Noted: 2023-04-17

## 2023-08-16 PROBLEM — U07.1 COVID: Status: RESOLVED | Noted: 2023-01-09 | Resolved: 2023-08-16

## 2023-08-16 PROBLEM — I65.29 OCCLUSION AND STENOSIS OF UNSPECIFIED CAROTID ARTERY: Status: ACTIVE | Noted: 2023-08-16

## 2023-08-16 PROBLEM — B33.8 RSV (RESPIRATORY SYNCYTIAL VIRUS INFECTION): Status: RESOLVED | Noted: 2022-10-21 | Resolved: 2023-08-16

## 2023-08-16 PROBLEM — N18.31 ACUTE RENAL FAILURE SUPERIMPOSED ON STAGE 3A CHRONIC KIDNEY DISEASE (HCC): Status: RESOLVED | Noted: 2022-04-14 | Resolved: 2023-08-16

## 2023-08-16 PROBLEM — N17.9 ACUTE RENAL FAILURE SUPERIMPOSED ON STAGE 3A CHRONIC KIDNEY DISEASE (HCC): Status: RESOLVED | Noted: 2022-04-14 | Resolved: 2023-08-16

## 2023-08-16 PROBLEM — I13.0 HYPERTENSIVE HEART AND CHRONIC KIDNEY DISEASE WITH HEART FAILURE AND STAGE 1 THROUGH STAGE 4 CHRONIC KIDNEY DISEASE, OR UNSPECIFIED CHRONIC KIDNEY DISEASE (HCC): Status: ACTIVE | Noted: 2023-04-17

## 2023-08-16 PROBLEM — R33.9 RETENTION OF URINE, UNSPECIFIED: Status: ACTIVE | Noted: 2023-04-17

## 2023-08-16 PROBLEM — J93.9 PNEUMOTHORAX ON RIGHT: Status: RESOLVED | Noted: 2023-02-21 | Resolved: 2023-08-16

## 2023-08-16 PROBLEM — J96.21 ACUTE AND CHRONIC RESPIRATORY FAILURE WITH HYPOXIA (HCC): Status: ACTIVE | Noted: 2023-04-17

## 2023-08-16 PROBLEM — R07.9 CHEST PAIN: Status: RESOLVED | Noted: 2021-02-21 | Resolved: 2023-08-16

## 2023-08-16 PROBLEM — H52.6 OTHER DISORDERS OF REFRACTION: Status: ACTIVE | Noted: 2023-08-16

## 2023-08-16 LAB
2HR DELTA HS TROPONIN: -5 NG/L
ALBUMIN SERPL BCP-MCNC: 4.1 G/DL (ref 3.5–5)
ALP SERPL-CCNC: 57 U/L (ref 34–104)
ALT SERPL W P-5'-P-CCNC: 36 U/L (ref 7–52)
ANION GAP SERPL CALCULATED.3IONS-SCNC: 9 MMOL/L
APTT PPP: 25 SECONDS (ref 23–37)
AST SERPL W P-5'-P-CCNC: 28 U/L (ref 13–39)
BASOPHILS # BLD AUTO: 0.04 THOUSANDS/ÂΜL (ref 0–0.1)
BASOPHILS NFR BLD AUTO: 0 % (ref 0–1)
BILIRUB SERPL-MCNC: 0.61 MG/DL (ref 0.2–1)
BUN SERPL-MCNC: 23 MG/DL (ref 5–25)
CALCIUM SERPL-MCNC: 9 MG/DL (ref 8.4–10.2)
CARDIAC TROPONIN I PNL SERPL HS: 36 NG/L
CARDIAC TROPONIN I PNL SERPL HS: 41 NG/L
CHLORIDE SERPL-SCNC: 99 MMOL/L (ref 96–108)
CO2 SERPL-SCNC: 32 MMOL/L (ref 21–32)
CREAT SERPL-MCNC: 1.21 MG/DL (ref 0.6–1.3)
EOSINOPHIL # BLD AUTO: 0.03 THOUSAND/ÂΜL (ref 0–0.61)
EOSINOPHIL NFR BLD AUTO: 0 % (ref 0–6)
ERYTHROCYTE [DISTWIDTH] IN BLOOD BY AUTOMATED COUNT: 14 % (ref 11.6–15.1)
GFR SERPL CREATININE-BSD FRML MDRD: 56 ML/MIN/1.73SQ M
GLUCOSE SERPL-MCNC: 116 MG/DL (ref 65–140)
HCT VFR BLD AUTO: 47.5 % (ref 36.5–49.3)
HGB BLD-MCNC: 14.6 G/DL (ref 12–17)
IMM GRANULOCYTES # BLD AUTO: 0.08 THOUSAND/UL (ref 0–0.2)
IMM GRANULOCYTES NFR BLD AUTO: 1 % (ref 0–2)
INR PPP: 1.03 (ref 0.84–1.19)
LYMPHOCYTES # BLD AUTO: 0.55 THOUSANDS/ÂΜL (ref 0.6–4.47)
LYMPHOCYTES NFR BLD AUTO: 4 % (ref 14–44)
MCH RBC QN AUTO: 31.2 PG (ref 26.8–34.3)
MCHC RBC AUTO-ENTMCNC: 30.7 G/DL (ref 31.4–37.4)
MCV RBC AUTO: 102 FL (ref 82–98)
MONOCYTES # BLD AUTO: 0.61 THOUSAND/ÂΜL (ref 0.17–1.22)
MONOCYTES NFR BLD AUTO: 4 % (ref 4–12)
NEUTROPHILS # BLD AUTO: 13 THOUSANDS/ÂΜL (ref 1.85–7.62)
NEUTS SEG NFR BLD AUTO: 91 % (ref 43–75)
NRBC BLD AUTO-RTO: 0 /100 WBCS
PLATELET # BLD AUTO: 228 THOUSANDS/UL (ref 149–390)
PMV BLD AUTO: 11.5 FL (ref 8.9–12.7)
POTASSIUM SERPL-SCNC: 4.1 MMOL/L (ref 3.5–5.3)
PROCALCITONIN SERPL-MCNC: 0.08 NG/ML
PROT SERPL-MCNC: 6.2 G/DL (ref 6.4–8.4)
PROTHROMBIN TIME: 13.7 SECONDS (ref 11.6–14.5)
RBC # BLD AUTO: 4.68 MILLION/UL (ref 3.88–5.62)
SODIUM SERPL-SCNC: 140 MMOL/L (ref 135–147)
WBC # BLD AUTO: 14.31 THOUSAND/UL (ref 4.31–10.16)

## 2023-08-16 PROCEDURE — 87449 NOS EACH ORGANISM AG IA: CPT | Performed by: PHYSICIAN ASSISTANT

## 2023-08-16 PROCEDURE — 84145 PROCALCITONIN (PCT): CPT | Performed by: PHYSICIAN ASSISTANT

## 2023-08-16 PROCEDURE — 85610 PROTHROMBIN TIME: CPT | Performed by: INTERNAL MEDICINE

## 2023-08-16 PROCEDURE — 71045 X-RAY EXAM CHEST 1 VIEW: CPT

## 2023-08-16 PROCEDURE — 87070 CULTURE OTHR SPECIMN AEROBIC: CPT | Performed by: PHYSICIAN ASSISTANT

## 2023-08-16 PROCEDURE — 87205 SMEAR GRAM STAIN: CPT | Performed by: PHYSICIAN ASSISTANT

## 2023-08-16 PROCEDURE — 85730 THROMBOPLASTIN TIME PARTIAL: CPT | Performed by: INTERNAL MEDICINE

## 2023-08-16 PROCEDURE — 99215 OFFICE O/P EST HI 40 MIN: CPT | Performed by: FAMILY MEDICINE

## 2023-08-16 PROCEDURE — 84484 ASSAY OF TROPONIN QUANT: CPT | Performed by: INTERNAL MEDICINE

## 2023-08-16 PROCEDURE — 94760 N-INVAS EAR/PLS OXIMETRY 1: CPT

## 2023-08-16 PROCEDURE — 80053 COMPREHEN METABOLIC PANEL: CPT | Performed by: INTERNAL MEDICINE

## 2023-08-16 PROCEDURE — 85025 COMPLETE CBC W/AUTO DIFF WBC: CPT | Performed by: INTERNAL MEDICINE

## 2023-08-16 PROCEDURE — 99285 EMERGENCY DEPT VISIT HI MDM: CPT | Performed by: INTERNAL MEDICINE

## 2023-08-16 PROCEDURE — 36415 COLL VENOUS BLD VENIPUNCTURE: CPT | Performed by: INTERNAL MEDICINE

## 2023-08-16 PROCEDURE — 94664 DEMO&/EVAL PT USE INHALER: CPT

## 2023-08-16 PROCEDURE — 93005 ELECTROCARDIOGRAM TRACING: CPT

## 2023-08-16 PROCEDURE — 99223 1ST HOSP IP/OBS HIGH 75: CPT | Performed by: PHYSICIAN ASSISTANT

## 2023-08-16 RX ORDER — ONDANSETRON 2 MG/ML
4 INJECTION INTRAMUSCULAR; INTRAVENOUS EVERY 6 HOURS PRN
Status: DISCONTINUED | OUTPATIENT
Start: 2023-08-16 | End: 2023-08-19 | Stop reason: HOSPADM

## 2023-08-16 RX ORDER — CEFTRIAXONE 1 G/50ML
1000 INJECTION, SOLUTION INTRAVENOUS EVERY 24 HOURS
Status: DISCONTINUED | OUTPATIENT
Start: 2023-08-16 | End: 2023-08-17

## 2023-08-16 RX ORDER — FUROSEMIDE 40 MG/1
40 TABLET ORAL DAILY PRN
Status: DISCONTINUED | OUTPATIENT
Start: 2023-08-16 | End: 2023-08-18

## 2023-08-16 RX ORDER — HEPARIN SODIUM 5000 [USP'U]/ML
5000 INJECTION, SOLUTION INTRAVENOUS; SUBCUTANEOUS EVERY 8 HOURS SCHEDULED
Status: DISCONTINUED | OUTPATIENT
Start: 2023-08-16 | End: 2023-08-19 | Stop reason: HOSPADM

## 2023-08-16 RX ORDER — LEVALBUTEROL INHALATION SOLUTION 1.25 MG/3ML
1.25 SOLUTION RESPIRATORY (INHALATION)
Status: DISCONTINUED | OUTPATIENT
Start: 2023-08-16 | End: 2023-08-19 | Stop reason: HOSPADM

## 2023-08-16 RX ORDER — PANTOPRAZOLE SODIUM 40 MG/1
40 TABLET, DELAYED RELEASE ORAL
Status: DISCONTINUED | OUTPATIENT
Start: 2023-08-17 | End: 2023-08-19 | Stop reason: HOSPADM

## 2023-08-16 RX ORDER — FORMOTEROL FUMARATE 20 UG/2ML
20 SOLUTION RESPIRATORY (INHALATION) 2 TIMES DAILY
Status: DISCONTINUED | OUTPATIENT
Start: 2023-08-16 | End: 2023-08-16

## 2023-08-16 RX ORDER — POTASSIUM CHLORIDE 20 MEQ/1
20 TABLET, EXTENDED RELEASE ORAL DAILY
Status: DISCONTINUED | OUTPATIENT
Start: 2023-08-17 | End: 2023-08-19 | Stop reason: HOSPADM

## 2023-08-16 RX ORDER — METRONIDAZOLE 500 MG/100ML
500 INJECTION, SOLUTION INTRAVENOUS EVERY 8 HOURS
Status: DISCONTINUED | OUTPATIENT
Start: 2023-08-16 | End: 2023-08-17

## 2023-08-16 RX ORDER — FORMOTEROL FUMARATE 20 UG/2ML
20 SOLUTION RESPIRATORY (INHALATION)
Status: DISCONTINUED | OUTPATIENT
Start: 2023-08-17 | End: 2023-08-17

## 2023-08-16 RX ORDER — BUDESONIDE 0.5 MG/2ML
0.5 INHALANT ORAL 2 TIMES DAILY
Status: DISCONTINUED | OUTPATIENT
Start: 2023-08-16 | End: 2023-08-16

## 2023-08-16 RX ORDER — ASPIRIN 81 MG/1
81 TABLET, CHEWABLE ORAL
Status: DISCONTINUED | OUTPATIENT
Start: 2023-08-16 | End: 2023-08-19 | Stop reason: HOSPADM

## 2023-08-16 RX ORDER — ATORVASTATIN CALCIUM 80 MG/1
80 TABLET, FILM COATED ORAL
Status: DISCONTINUED | OUTPATIENT
Start: 2023-08-17 | End: 2023-08-19 | Stop reason: HOSPADM

## 2023-08-16 RX ORDER — FAMOTIDINE 20 MG/1
20 TABLET, FILM COATED ORAL
Status: DISCONTINUED | OUTPATIENT
Start: 2023-08-16 | End: 2023-08-19 | Stop reason: HOSPADM

## 2023-08-16 RX ORDER — ALBUTEROL SULFATE 2.5 MG/3ML
2.5 SOLUTION RESPIRATORY (INHALATION) ONCE
Status: COMPLETED | OUTPATIENT
Start: 2023-08-16 | End: 2023-08-16

## 2023-08-16 RX ORDER — METOPROLOL SUCCINATE 25 MG/1
12.5 TABLET, EXTENDED RELEASE ORAL DAILY
Status: DISCONTINUED | OUTPATIENT
Start: 2023-08-17 | End: 2023-08-19 | Stop reason: HOSPADM

## 2023-08-16 RX ORDER — ACETAMINOPHEN 325 MG/1
650 TABLET ORAL EVERY 6 HOURS PRN
Status: DISCONTINUED | OUTPATIENT
Start: 2023-08-16 | End: 2023-08-19 | Stop reason: HOSPADM

## 2023-08-16 RX ORDER — HYDROCODONE BITARTRATE AND ACETAMINOPHEN 5; 325 MG/1; MG/1
1 TABLET ORAL EVERY 6 HOURS PRN
Status: DISCONTINUED | OUTPATIENT
Start: 2023-08-16 | End: 2023-08-19 | Stop reason: HOSPADM

## 2023-08-16 RX ORDER — METHYLPREDNISOLONE SODIUM SUCCINATE 125 MG/2ML
80 INJECTION, POWDER, LYOPHILIZED, FOR SOLUTION INTRAMUSCULAR; INTRAVENOUS ONCE
Status: COMPLETED | OUTPATIENT
Start: 2023-08-16 | End: 2023-08-16

## 2023-08-16 RX ORDER — BUDESONIDE 0.5 MG/2ML
0.5 INHALANT ORAL
Status: DISCONTINUED | OUTPATIENT
Start: 2023-08-17 | End: 2023-08-19 | Stop reason: HOSPADM

## 2023-08-16 RX ORDER — GUAIFENESIN/DEXTROMETHORPHAN 100-10MG/5
5 SYRUP ORAL 3 TIMES DAILY PRN
Status: DISCONTINUED | OUTPATIENT
Start: 2023-08-16 | End: 2023-08-19 | Stop reason: HOSPADM

## 2023-08-16 RX ORDER — METHYLPREDNISOLONE SODIUM SUCCINATE 40 MG/ML
40 INJECTION, POWDER, LYOPHILIZED, FOR SOLUTION INTRAMUSCULAR; INTRAVENOUS EVERY 8 HOURS
Status: DISCONTINUED | OUTPATIENT
Start: 2023-08-17 | End: 2023-08-17

## 2023-08-16 RX ORDER — SODIUM CHLORIDE FOR INHALATION 0.9 %
3 VIAL, NEBULIZER (ML) INHALATION
Status: DISCONTINUED | OUTPATIENT
Start: 2023-08-16 | End: 2023-08-16

## 2023-08-16 RX ORDER — IPRATROPIUM BROMIDE AND ALBUTEROL SULFATE 2.5; .5 MG/3ML; MG/3ML
SOLUTION RESPIRATORY (INHALATION)
COMMUNITY
Start: 2023-08-14

## 2023-08-16 RX ORDER — GABAPENTIN 300 MG/1
300 CAPSULE ORAL
Status: DISCONTINUED | OUTPATIENT
Start: 2023-08-16 | End: 2023-08-19 | Stop reason: HOSPADM

## 2023-08-16 RX ORDER — METHOCARBAMOL 500 MG/1
500 TABLET, FILM COATED ORAL 3 TIMES DAILY PRN
Status: DISCONTINUED | OUTPATIENT
Start: 2023-08-16 | End: 2023-08-19 | Stop reason: HOSPADM

## 2023-08-16 RX ADMIN — ALBUTEROL SULFATE 2.5 MG: 2.5 SOLUTION RESPIRATORY (INHALATION) at 17:33

## 2023-08-16 RX ADMIN — GABAPENTIN 300 MG: 300 CAPSULE ORAL at 21:53

## 2023-08-16 RX ADMIN — ASPIRIN 81 MG 81 MG: 81 TABLET ORAL at 21:53

## 2023-08-16 RX ADMIN — HEPARIN SODIUM 5000 UNITS: 5000 INJECTION INTRAVENOUS; SUBCUTANEOUS at 21:53

## 2023-08-16 RX ADMIN — ALBUTEROL SULFATE 2.5 MG: 2.5 SOLUTION RESPIRATORY (INHALATION) at 18:34

## 2023-08-16 RX ADMIN — METRONIDAZOLE 500 MG: 500 INJECTION, SOLUTION INTRAVENOUS at 22:38

## 2023-08-16 RX ADMIN — CEFTRIAXONE 1000 MG: 1 INJECTION, SOLUTION INTRAVENOUS at 21:53

## 2023-08-16 RX ADMIN — FAMOTIDINE 20 MG: 20 TABLET, FILM COATED ORAL at 21:53

## 2023-08-16 RX ADMIN — METHYLPREDNISOLONE SODIUM SUCCINATE 80 MG: 125 INJECTION, POWDER, FOR SOLUTION INTRAMUSCULAR; INTRAVENOUS at 17:31

## 2023-08-16 NOTE — ED PROVIDER NOTES
History  Chief Complaint   Patient presents with   • Shortness of Breath     Hx COPD, wears 3 L baseline but has been wearing 4 L today. Dx with pneumonia and given PO abx on  ,  sent by PCP for IV abx     Jaci Morgan is an 59-year-old male with significant COPD, on aerosolized budesonide, formoterol, and DuoNebs who is failing to improve. Was seen here on the , given Augmentin and a tapering dose of prednisone. Despite this he is failing and feels more short of breath. His O2 sats were in the mid to high 80s at home and he normally uses 3 L of oxygen. He had increased to 4 to 5 L of oxygen in order to get in the 90s. He is coughing. He did have episode of chest pain when he was initially seen on the  but not having much pain anymore. Denies any fevers or chills. Recent CTA noted on the chart for feeling revealing a pulmonary embolism, new bronchial is new hilar adenopathy is appreciated. Prior to Admission Medications   Prescriptions Last Dose Informant Patient Reported? Taking?    ALPRAZolam (XANAX) 0.25 mg tablet  Spouse/Significant Other No No   Sig: Take 1 tablet (0.25 mg total) by mouth 2 (two) times a day as needed for anxiety   Patient not taking: Reported on 2023   HYDROcodone-acetaminophen (Norco) 5-325 mg per tablet  Spouse/Significant Other No No   Sig: Take 1 tablet by mouth every 6 (six) hours as needed for pain Max Daily Amount: 4 tablets   amoxicillin-clavulanate (AUGMENTIN) 875-125 mg per tablet   No No   Sig: Take 1 tablet by mouth every 12 (twelve) hours for 7 days   aspirin 81 mg chewable tablet  Spouse/Significant Other Yes No   Si mg daily at bedtime   azithromycin (ZITHROMAX) 250 mg tablet  Spouse/Significant Other Yes No   Sig: Take 500 mg by mouth 3 (three) times a week Monday, Wednesday, Friday   budesonide (PULMICORT) 0.5 mg/2 mL nebulizer solution  Spouse/Significant Other No No   Sig: Take 2 mL (0.5 mg total) by nebulization 2 (two) times a day Rinse mouth after use. dextromethorphan-guaiFENesin (ROBITUSSIN DM)  mg/5 mL syrup  Spouse/Significant Other No No   Sig: Take 5 mL by mouth 3 (three) times a day as needed for cough   famotidine (PEPCID) 20 mg tablet  Spouse/Significant Other No No   Sig: TAKE 1 TABLET BY MOUTH DAILY AT  BEDTIME   formoterol (PERFOROMIST) 20 MCG/2ML nebulizer solution  Spouse/Significant Other No No   Sig: Take 2 mL (20 mcg total) by nebulization 2 (two) times a day   furosemide (LASIX) 40 mg tablet  Spouse/Significant Other No No   Sig: Take 1 tablet (40 mg total) by mouth daily as needed (weight gain more than 3 lbs in 24 hours or worsening lower extremities edema)   gabapentin (NEURONTIN) 300 mg capsule  Spouse/Significant Other No No   Sig: Take 1 capsule (300 mg total) by mouth daily at bedtime   ipratropium-albuterol (DUO-NEB) 0.5-2.5 mg/3 mL nebulizer solution   Yes No   methocarbamol (ROBAXIN) 500 mg tablet  Spouse/Significant Other No No   Sig: Take 1 tablet (500 mg total) by mouth 3 (three) times a day as needed for muscle spasms   metoprolol succinate (TOPROL-XL) 25 mg 24 hr tablet  Spouse/Significant Other No No   Sig: Take 0.5 tablets (12.5 mg total) by mouth daily   naloxone (NARCAN) 4 mg/0.1 mL nasal spray  Spouse/Significant Other No No   Sig: Administer 1 spray into a nostril. If no response after 2-3 minutes, give another dose in the other nostril using a new spray.    omeprazole (PriLOSEC) 20 mg delayed release capsule   No No   Sig: Take 1 capsule (20 mg total) by mouth daily   oxygen gas  Spouse/Significant Other Yes No   Sig: Inhale 2 L/min continuous 2LPM at rest and 3-4 LPM with activity per D Cedric FANG notes   potassium chloride (K-DUR,KLOR-CON) 20 mEq tablet  Spouse/Significant Other No No   Sig: Take 1 tablet (20 mEq total) by mouth daily   predniSONE 10 mg tablet   No No   Sig: Take 4 tablets (40 mg total) by mouth daily for 3 days, THEN 3 tablets (30 mg total) daily for 3 days, THEN 2 tablets (20 mg total) daily for 3 days, THEN 1 tablet (10 mg total) daily for 3 days. rosuvastatin (CRESTOR) 40 MG tablet  Spouse/Significant Other No No   Sig: TAKE 1 TABLET DAILY   Patient taking differently: Take 40 mg by mouth daily at bedtime      Facility-Administered Medications: None       Past Medical History:   Diagnosis Date   • Abnormal ECG 2021 OR 2022   • Alcoholism ([address]) 1984    sober 38 years   • Arthritis 2008    beginning   • Bilateral carotid artery stenosis    • Callus    • Cancer (HCC)     skin   • Chronic diastolic heart failure (HCC)    • Chronic ischemic heart disease    • Chronic kidney disease     stage 3   • Colon polyp    • COPD (chronic obstructive pulmonary disease) (HCC)    • Coronary artery disease     hx stents, MI, PCI   • COVID 11/2021   • CPAP (continuous positive airway pressure) dependence    • Disease of thyroid gland 2017    nodules   • Emphysema of lung ([address]) 1995    started   • Hearing loss    • History of transfusion 1995   • Hyperlipidemia    • Hypertension    • Intracranial aneurysm 04/25/2023   • Lung nodule 2023    x rays   • MI (myocardial infarction) ([address]) 1995   • Myocardial infarction ([address]) 1995   • Pneumonia    • Pneumothorax 02/20/2023    collapsed lung   • Prostate cancer ([address])    • RSV (respiratory syncytial virus infection) 10/2022   • S/P carotid endarterectomy    • Shortness of breath     O2 2 l/nc PRN   • Sleep apnea    • Sleep apnea, obstructive    • Stented coronary artery        Past Surgical History:   Procedure Laterality Date   • APPENDECTOMY     • CARDIAC CATHETERIZATION  03/18/2021    left main with no significant disease, proximal LAD with 10% stenosis at the site of prior stent, left circumflex artery with minimal luminal irregularities mid RCA with 50% stenosis at site of prior stent and PL segment with distal disease supplied by collaterals from the distal circumflex with no significant CAD requiring revascularization at that time.    • CATARACT EXTRACTION, BILATERAL Bilateral    • COLONOSCOPY     • CORONARY ANGIOPLASTY WITH STENT PLACEMENT      RCA   • CORONARY ANGIOPLASTY WITH STENT PLACEMENT      RCA   • CORONARY ANGIOPLASTY WITH STENT PLACEMENT      LAD   • EYE SURGERY     • NM BX PROSTATE STRTCTC SATURATION SAMPLING IMG GID N/A 2022    Procedure: TRANSPERINEAL MRI FUSION  BIOPSY PROSTATE;  Surgeon: Pablo Day MD;  Location: [facility];  Service: Urology   • NM NEUROPLASTY &/TRANSPOS MEDIAN NRV Maicol Ramirez Left 2022    Procedure: RELEASE CARPAL TUNNEL;  Surgeon: Jose Clarke MD;  Location: [facility];  Service: Orthopedics   • NM NEUROPLASTY &/TRANSPOSITION ULNAR NERVE ELBOW Left 2022    Procedure: RELEASE CUBITAL TUNNEL;  Surgeon: Jose Clarke MD;  Location: [facility];  Service: Orthopedics   • NM NEUROPLASTY &/TRANSPOSITION ULNAR NERVE WRIST Left 2022    Procedure: Mitchel Jorge;  Surgeon: Jose Clarke MD;  Location: [facility];  Service: Orthopedics   • SKIN CANCER EXCISION      chin-per pt, basal cell  also right ankle   • TONSILLECTOMY  no       Family History   Problem Relation Age of Onset   • Heart attack Mother    • Dementia Mother    • Heart failure Mother             • Heart disease Mother         heart attacks (2)   • No Known Problems Father      I have reviewed and agree with the history as documented.     E-Cigarette/Vaping   • E-Cigarette Use Never User      E-Cigarette/Vaping Substances   • Nicotine No    • THC No    • CBD No    • Flavoring No    • Other No    • Unknown No      Social History     Tobacco Use   • Smoking status: Former     Packs/day: 2.00     Years: 35.00     Total pack years: 70.00     Types: Cigarettes     Start date: 1960     Quit date: 1995     Years since quittin.1   • Smokeless tobacco: Never   • Tobacco comments:     stopped smoking the day i had a heart attack   Vaping Use   • Vaping Use: Never used   Substance Use Topics   • Alcohol use: Not Currently   • Drug use: Never       Review of Systems   Constitutional: Positive for fatigue. Negative for chills and fever. HENT: Negative for rhinorrhea and sore throat. Eyes: Negative for visual disturbance. Respiratory: Positive for cough and shortness of breath. Cardiovascular: Negative for chest pain and leg swelling. Gastrointestinal: Negative for abdominal pain, diarrhea, nausea and vomiting. Genitourinary: Negative for dysuria. Musculoskeletal: Negative for back pain and myalgias. Skin: Negative for rash. Neurological: Negative for dizziness and headaches. Psychiatric/Behavioral: Negative for confusion. All other systems reviewed and are negative. Physical Exam  Physical Exam  Vitals and nursing note reviewed. Constitutional:       Appearance: He is ill-appearing. Comments: Chronically ill-appearing   HENT:      Head: Normocephalic and atraumatic. Nose: Nose normal.      Mouth/Throat:      Pharynx: No oropharyngeal exudate. Eyes:      General: No scleral icterus. Conjunctiva/sclera: Conjunctivae normal.      Pupils: Pupils are equal, round, and reactive to light. Neck:      Vascular: No JVD. Trachea: No tracheal deviation. Cardiovascular:      Rate and Rhythm: Normal rate and regular rhythm. Heart sounds: Normal heart sounds. No murmur heard. Pulmonary:      Effort: Pulmonary effort is normal. No respiratory distress. Breath sounds: Examination of the right-upper field reveals decreased breath sounds and wheezing. Examination of the left-upper field reveals decreased breath sounds and wheezing. Examination of the right-middle field reveals decreased breath sounds and wheezing. Examination of the left-middle field reveals decreased breath sounds and wheezing. Examination of the right-lower field reveals decreased breath sounds and wheezing. Examination of the left-lower field reveals decreased breath sounds and wheezing.  Decreased breath sounds and wheezing present. No rales. Chest:      Chest wall: No mass. Abdominal:      General: Bowel sounds are normal.      Palpations: Abdomen is soft. Tenderness: There is no abdominal tenderness. There is no guarding. Musculoskeletal:         General: No tenderness. Normal range of motion. Cervical back: Normal range of motion and neck supple. Skin:     General: Skin is warm and dry. Neurological:      General: No focal deficit present. Mental Status: He is alert and oriented to person, place, and time. Cranial Nerves: No cranial nerve deficit. Sensory: No sensory deficit. Motor: No abnormal muscle tone.       Comments: 5/5 motor, nl sens   Psychiatric:         Behavior: Behavior normal.         Vital Signs  ED Triage Vitals [08/16/23 1640]   Temperature Pulse Respirations Blood Pressure SpO2   98.2 °F (36.8 °C) 95 16 110/65 96 %      Temp Source Heart Rate Source Patient Position - Orthostatic VS BP Location FiO2 (%)   Tympanic Monitor Lying Left arm --      Pain Score       No Pain           Vitals:    08/16/23 1640 08/16/23 1915 08/16/23 2000 08/16/23 2125   BP: 110/65 125/58 104/57 106/65   Pulse: 95 99 94 93   Patient Position - Orthostatic VS: Lying            Visual Acuity      ED Medications  Medications   aspirin chewable tablet 81 mg (has no administration in time range)   dextromethorphan-guaiFENesin (ROBITUSSIN DM) oral syrup 5 mL (has no administration in time range)   famotidine (PEPCID) tablet 20 mg (has no administration in time range)   furosemide (LASIX) tablet 40 mg (has no administration in time range)   gabapentin (NEURONTIN) capsule 300 mg (has no administration in time range)   HYDROcodone-acetaminophen (NORCO) 5-325 mg per tablet 1 tablet (has no administration in time range)   methocarbamol (ROBAXIN) tablet 500 mg (has no administration in time range)   metoprolol succinate (TOPROL-XL) 24 hr tablet 12.5 mg (has no administration in time range) pantoprazole (PROTONIX) EC tablet 40 mg (has no administration in time range)   potassium chloride (K-DUR,KLOR-CON) CR tablet 20 mEq (has no administration in time range)   atorvastatin (LIPITOR) tablet 80 mg (has no administration in time range)   acetaminophen (TYLENOL) tablet 650 mg (has no administration in time range)   levalbuterol (XOPENEX) inhalation solution 1.25 mg ( Nebulization Canceled Entry 8/16/23 2038)   ipratropium (ATROVENT) 0.02 % inhalation solution 0.5 mg ( Nebulization Canceled Entry 8/16/23 2038)   methylPREDNISolone sodium succinate (Solu-MEDROL) injection 40 mg (has no administration in time range)   heparin (porcine) subcutaneous injection 5,000 Units (has no administration in time range)   ondansetron (ZOFRAN) injection 4 mg (has no administration in time range)   cefTRIAXone (ROCEPHIN) IVPB (premix in dextrose) 1,000 mg 50 mL (has no administration in time range)     And   metroNIDAZOLE (FLAGYL) IVPB (premix) 500 mg 100 mL (has no administration in time range)   budesonide (PULMICORT) inhalation solution 0.5 mg (has no administration in time range)   formoterol (PERFOROMIST) nebulizer solution 20 mcg (has no administration in time range)   methylPREDNISolone sodium succinate (Solu-MEDROL) injection 80 mg (80 mg Intravenous Given 8/16/23 1731)   albuterol inhalation solution 2.5 mg (2.5 mg Nebulization Given 8/16/23 1733)   albuterol inhalation solution 2.5 mg (2.5 mg Nebulization Given 8/16/23 1834)       Diagnostic Studies  Results Reviewed     Procedure Component Value Units Date/Time    Procalcitonin [[id number]]  (Normal) Collected: 08/16/23 1740    Lab Status: Final result Specimen: Blood Updated: 08/16/23 2101     Procalcitonin 0.08 ng/ml     Sputum culture and Gram stain [[id number]]     Lab Status: No result Specimen: Sputum     Strep Pneumoniae, Urine [[id number]]     Lab Status: No result Specimen: Urine     Legionella antigen, Urine [[id number]]     Lab Status: No result Specimen: Urine     HS Troponin I 2hr [[id number]]  (Normal) Collected: 08/16/23 1929    Lab Status: Final result Specimen: Blood from Line, Venous Updated: 08/16/23 1957     hs TnI 2hr 36 ng/L      Delta 2hr hsTnI -5 ng/L     HS Troponin I 4hr [[id number]]     Lab Status: No result Specimen: Blood     CBC and differential [[id number]]  (Abnormal) Collected: 08/16/23 1740    Lab Status: Final result Specimen: Blood from Arm, Right Updated: 08/16/23 1817     WBC 14.31 Thousand/uL      RBC 4.68 Million/uL      Hemoglobin 14.6 g/dL      Hematocrit 47.5 %       fL      MCH 31.2 pg      MCHC 30.7 g/dL      RDW 14.0 %      MPV 11.5 fL      Platelets 459 Thousands/uL      nRBC 0 /100 WBCs      Neutrophils Relative 91 %      Immat GRANS % 1 %      Lymphocytes Relative 4 %      Monocytes Relative 4 %      Eosinophils Relative 0 %      Basophils Relative 0 %      Neutrophils Absolute 13.00 Thousands/µL      Immature Grans Absolute 0.08 Thousand/uL      Lymphocytes Absolute 0.55 Thousands/µL      Monocytes Absolute 0.61 Thousand/µL      Eosinophils Absolute 0.03 Thousand/µL      Basophils Absolute 0.04 Thousands/µL     Narrative: This is an appended report. These results have been appended to a previously verified report.     HS Troponin 0hr (reflex protocol) [[id number]]  (Normal) Collected: 08/16/23 1740    Lab Status: Final result Specimen: Blood from Arm, Right Updated: 08/16/23 1815     hs TnI 0hr 41 ng/L     Protime-INR [[id number]]  (Normal) Collected: 08/16/23 1740    Lab Status: Final result Specimen: Blood from Arm, Right Updated: 08/16/23 1810     Protime 13.7 seconds      INR 1.03    APTT [[id number]]  (Normal) Collected: 08/16/23 1740    Lab Status: Final result Specimen: Blood from Arm, Right Updated: 08/16/23 1810     PTT 25 seconds     Comprehensive metabolic panel [[id number]]  (Abnormal) Collected: 08/16/23 1740    Lab Status: Final result Specimen: Blood from Arm, Right Updated: 08/16/23 1807     Sodium 140 mmol/L      Potassium 4.1 mmol/L      Chloride 99 mmol/L      CO2 32 mmol/L      ANION GAP 9 mmol/L      BUN 23 mg/dL      Creatinine 1.21 mg/dL      Glucose 116 mg/dL      Calcium 9.0 mg/dL      AST 28 U/L      ALT 36 U/L      Alkaline Phosphatase 57 U/L      Total Protein 6.2 g/dL      Albumin 4.1 g/dL      Total Bilirubin 0.61 mg/dL      eGFR 56 ml/min/1.73sq m     Narrative:      [facility] guidelines for Chronic Kidney Disease (CKD):   •  Stage 1 with normal or high GFR (GFR > 90 mL/min/1.73 square meters)  •  Stage 2 Mild CKD (GFR = 60-89 mL/min/1.73 square meters)  •  Stage 3A Moderate CKD (GFR = 45-59 mL/min/1.73 square meters)  •  Stage 3B Moderate CKD (GFR = 30-44 mL/min/1.73 square meters)  •  Stage 4 Severe CKD (GFR = 15-29 mL/min/1.73 square meters)  •  Stage 5 End Stage CKD (GFR <15 mL/min/1.73 square meters)  Note: GFR calculation is accurate only with a steady state creatinine                 XR chest 1 view portable   ED Interpretation by Sandeep Thomas DO (08/16 1842)   Seems to have progressive the right lower lobe infiltrate, despite recent CAT scan suggesting improvement.                  Procedures  ECG 12 Lead Documentation Only    Date/Time: 8/16/2023 4:50 PM    Performed by: Sandeep Thomas DO  Authorized by: Sandeep Thomas DO    Indications / Diagnosis:  Shortness of breath  ECG reviewed by me, the ED Provider: no    Patient location:  ED  Previous ECG:     Previous ECG:  Compared to current    Similarity:  No change    Comparison to cardiac monitor: Yes    Interpretation:     Interpretation: abnormal    Rate:     ECG rate:  91    ECG rate assessment: normal    Rhythm:     Rhythm: sinus rhythm    Ectopy:     Ectopy: PVCs      PVCs:  Infrequent  QRS:     QRS axis:  Normal    QRS intervals:  Normal  Conduction:     Conduction: normal    ST segments:     ST segments:  Non-specific  T waves:     T waves: non-specific    Other findings:     Other findings: LAE ECG 12 Lead Documentation Only    Date/Time: 8/16/2023 7:35 PM    Performed by: Monty Bach DO  Authorized by: Monty Bach DO    Indications / Diagnosis:  COPD exacerbation  ECG reviewed by me, the ED Provider: yes    Patient location:  ED  Previous ECG:     Previous ECG:  Compared to current    Similarity:  No change  Interpretation:     Interpretation: non-specific    Rate:     ECG rate:  96    ECG rate assessment: normal    Rhythm:     Rhythm: sinus rhythm    Ectopy:     Ectopy: PVCs      PVCs:  Infrequent  QRS:     QRS axis:  Normal    QRS intervals:  Normal  Conduction:     Conduction: normal    ST segments:     ST segments:  Normal  T waves:     T waves: normal    Other findings:     Other findings: LAE               ED Course  ED Course as of 08/16/23 2138   Wed Aug 16, 2023   1819 Patient's sepsis score is 8, clinically is just a COPD exacerbation. Sepsis pathway was not    2021 Patient overall improved after 2 treatments, but still wheezing, still with increased O2 demand. Admitted for further evaluation and treatment. Concerns for aspiration are noted. Troponin delta is -5             HEART Risk Score    Flowsheet Row Most Recent Value   Heart Score Risk Calculator    History 1 Filed at: 08/16/2023 1916   ECG 1 Filed at: 08/16/2023 1916   Age 2 Filed at: 08/16/2023 1916   Risk Factors 1 Filed at: 08/16/2023 1916   Troponin 1 Filed at: 08/16/2023 1916   HEART Score 6 Filed at: 08/16/2023 1916                        SBIRT 22yo+    Flowsheet Row Most Recent Value   Initial Alcohol Screen: US AUDIT-C     1. How often do you have a drink containing alcohol? 0 Filed at: 08/16/2023 1642   2. How many drinks containing alcohol do you have on a typical day you are drinking? 0 Filed at: 08/16/2023 1642   3a. Male UNDER 65: How often do you have five or more drinks on one occasion? 0 Filed at: 08/16/2023 1642   3b. FEMALE Any Age, or MALE 65+:  How often do you have 4 or more drinks on one occassion? 0 Filed at: 08/16/2023 1642   Audit-C Score 0 Filed at: 08/16/2023 1642   CRISTOPHER: How many times in the past year have you. .. Used an illegal drug or used a prescription medication for non-medical reasons? Never Filed at: 08/16/2023 1642                    Medical Decision Making  79-year-old with history of coronary disease and underlying COPD and is O2 dependent at 3 L, being treated as an outpatient for COPD exacerbation. His O2 requirements changed today, going to 3 L to 5 L to maintain 92% saturation. He had dropped into the mid 80s. He does have a cough. He has had no fever, or shaking chills. Cough is relatively nonproductive. Bothered mostly by his wheezing. History of underlying coronary artery disease as well. Differential includes congestive heart failure, COPD exacerbation most likely. Some question of aspiration noted on recent CAT scan and a broncholith is appreciated. COPD exacerbation [facility]): acute illness or injury     Details: Failure of outpatient medication try to manage this, requiring inpatient admission at this point. Amount and/or Complexity of Data Reviewed  External Data Reviewed: labs, radiology and ECG. Details: Reviewed recent CAT scans revealing the broncholith and possible aspiration changes. Resolving? Infiltrates. Recent x-rays also reviewed, showing a persistent right lower lobe abnormalities. Laboratory data from recent evaluation in the emergency room reviewed, noting a slightly elevated BNP. Labs: ordered. Details: Labs today unchanged from the 13th. Radiology: ordered and independent interpretation performed. Details: Chest x-ray revealed persistent right lower lobe changes. ECG/medicine tests: ordered. Details: EKG ordered and separately interpreted by me. No evidence of ischemia. Normal sinus rhythm. No ST elevation. Risk  Prescription drug management. Decision regarding hospitalization.     Risk Details: Discussed with family, patient, and hospitalist regards to need for hospitalization. He failed outpatient management of his COPD exacerbation, requiring inpatient treatment and therapy. IV steroids were started. IV antibiotics were discussed with the admitting service given the persistence of the infiltrate. Recent CAT scan noted, consideration for speech evaluation. If he were to be discharged he would have a high rate of return, also high risk of comorbidity. Disposition  Final diagnoses:   COPD exacerbation ([address])     Time reflects when diagnosis was documented in both MDM as applicable and the Disposition within this note     Time User Action Codes Description Comment    8/16/2023  8:01 PM Maximo Weldon Add [J44.1] COPD exacerbation [facility])       ED Disposition     ED Disposition   Admit    Condition   Stable    Date/Time   Wed Aug 16, 2023  8:01 PM    Comment   Case was discussed with Ewa Mcgee  and the patient's admission status was agreed to be Admission Status: inpatient status to the service of Dr. Ashley Skelton . Follow-up Information    None         Current Discharge Medication List      CONTINUE these medications which have NOT CHANGED    Details   ALPRAZolam (XANAX) 0.25 mg tablet Take 1 tablet (0.25 mg total) by mouth 2 (two) times a day as needed for anxiety  Qty: 15 tablet, Refills: 0    Associated Diagnoses: Anxiety      amoxicillin-clavulanate (AUGMENTIN) 875-125 mg per tablet Take 1 tablet by mouth every 12 (twelve) hours for 7 days  Qty: 14 tablet, Refills: 0    Associated Diagnoses: COPD exacerbation (HCC)      aspirin 81 mg chewable tablet 81 mg daily at bedtime      azithromycin (ZITHROMAX) 250 mg tablet Take 500 mg by mouth 3 (three) times a week Monday, Wednesday, Friday      budesonide (PULMICORT) 0.5 mg/2 mL nebulizer solution Take 2 mL (0.5 mg total) by nebulization 2 (two) times a day Rinse mouth after use.   Qty: 180 mL, Refills: 0    Associated Diagnoses: Chronic obstructive pulmonary disease, unspecified COPD type ([address])      dextromethorphan-guaiFENesin (ROBITUSSIN DM)  mg/5 mL syrup Take 5 mL by mouth 3 (three) times a day as needed for cough  Qty: 354 mL, Refills: 0    Associated Diagnoses: COPD with exacerbation (HCC)      famotidine (PEPCID) 20 mg tablet TAKE 1 TABLET BY MOUTH DAILY AT  BEDTIME  Qty: 100 tablet, Refills: 2    Comments: Requesting 1 year supply  Associated Diagnoses: Gastroesophageal reflux disease, unspecified whether esophagitis present      formoterol (PERFOROMIST) 20 MCG/2ML nebulizer solution Take 2 mL (20 mcg total) by nebulization 2 (two) times a day  Qty: 180 mL, Refills: 5    Associated Diagnoses: Chronic obstructive pulmonary disease, unspecified COPD type (HCC)      furosemide (LASIX) 40 mg tablet Take 1 tablet (40 mg total) by mouth daily as needed (weight gain more than 3 lbs in 24 hours or worsening lower extremities edema)  Qty: 100 tablet, Refills: 0    Associated Diagnoses: Chronic ischemic heart disease      gabapentin (NEURONTIN) 300 mg capsule Take 1 capsule (300 mg total) by mouth daily at bedtime    Associated Diagnoses: Chronic pain syndrome; Intercostal neuralgia;  Chronic right-sided thoracic back pain      HYDROcodone-acetaminophen (Norco) 5-325 mg per tablet Take 1 tablet by mouth every 6 (six) hours as needed for pain Max Daily Amount: 4 tablets  Qty: 15 tablet, Refills: 0    Associated Diagnoses: Costochondral chest pain      ipratropium-albuterol (DUO-NEB) 0.5-2.5 mg/3 mL nebulizer solution       methocarbamol (ROBAXIN) 500 mg tablet Take 1 tablet (500 mg total) by mouth 3 (three) times a day as needed for muscle spasms  Qty: 30 tablet, Refills: 0    Associated Diagnoses: Chronic right-sided thoracic back pain      metoprolol succinate (TOPROL-XL) 25 mg 24 hr tablet Take 0.5 tablets (12.5 mg total) by mouth daily  Qty: 45 tablet, Refills: 3    Associated Diagnoses: Chest pain, unspecified type      naloxone (NARCAN) 4 mg/0.1 mL nasal spray Administer 1 spray into a nostril. If no response after 2-3 minutes, give another dose in the other nostril using a new spray. Qty: 1 each, Refills: 1    Associated Diagnoses: Costochondral chest pain      omeprazole (PriLOSEC) 20 mg delayed release capsule Take 1 capsule (20 mg total) by mouth daily  Qty: 28 capsule, Refills: 0    Associated Diagnoses: Atypical chest pain; Gastroesophageal reflux disease, unspecified whether esophagitis present      oxygen gas Inhale 2 L/min continuous 2LPM at rest and 3-4 LPM with activity per D Cedric FANG notes      potassium chloride (K-DUR,KLOR-CON) 20 mEq tablet Take 1 tablet (20 mEq total) by mouth daily  Qty: 90 tablet, Refills: 0    Associated Diagnoses: Hypokalemia      predniSONE 10 mg tablet Take 4 tablets (40 mg total) by mouth daily for 3 days, THEN 3 tablets (30 mg total) daily for 3 days, THEN 2 tablets (20 mg total) daily for 3 days, THEN 1 tablet (10 mg total) daily for 3 days. Qty: 30 tablet, Refills: 0    Associated Diagnoses: COPD exacerbation ([address])      rosuvastatin (CRESTOR) 40 MG tablet TAKE 1 TABLET DAILY  Qty: 100 tablet, Refills: 3    Associated Diagnoses: Hyperlipidemia, unspecified hyperlipidemia type             No discharge procedures on file.     PDMP Review       Value Time User    PDMP Reviewed  Yes 6/30/2023  5:21 PM Tierra Zaman DO          ED Provider  Electronically Signed by           Nikki Russell DO  08/16/23 5205

## 2023-08-16 NOTE — PROGRESS NOTES
Assessment/Plan:       Problem List Items Addressed This Visit        Respiratory    Chronic obstructive pulmonary disease with acute exacerbation ([address]) - Primary    Relevant Medications    ipratropium-albuterol (DUO-NEB) 0.5-2.5 mg/3 mL nebulizer solution    Other Relevant Orders    Transfer to other facility (Completed)   Other Visit Diagnoses     Pleural effusion        Relevant Medications    ipratropium-albuterol (DUO-NEB) 0.5-2.5 mg/3 mL nebulizer solution    Hilar lymphadenopathy        Relevant Medications    ipratropium-albuterol (DUO-NEB) 0.5-2.5 mg/3 mL nebulizer solution    Other Relevant Orders    CT chest wo contrast            Recommended ED evaluation given respiratory distress and COPD exacerbation, increased oxygen requirements. He is agreeable and daughter will transport. Subjective:      Patient ID: Hernesto Marks is a 77 y.o. male. HPI     Seen in ED 8/13 for chest pain and GERD, started on omeprazole. WBC 14. EKG no significant change, chest XR Patchy right basilar opacity concerning for atelectasis versus infiltrate. CTA showed    "IMPRESSION:    1. No acute pulmonary embolism. 2. Increased right greater than left pleural effusions. 3. Bibasilar dependent patchy opacity with calcification, right greater than left, likely chronic atelectasis. Additional opacities seen on the prior study have improved or resolved. 4. New right hilar and subcarinal lymphadenopathy, likely reactive, though short interval follow-up CT in 3 months is recommended to ensure resolution. 5. New broncholith within the left mainstem bronchus, likely calcification of aspirated material as there was no adjacent calcification on the prior CT to indicate erosion. 6. Severe centrilobular emphysema."    He reports he is not feeling well today at all, very SOB, using [place] oxygen at 4L which typically baseline is 2-3. Coughing. Feels antibiotic and prednisone are not helping.  Using nebulizer around the clock with no relief. The following portions of the patient's history were reviewed and updated as appropriate: allergies, current medications, past family history, past medical history, past social history, past surgical history, and problem list.    Review of Systems   All other systems reviewed and are negative. Objective:      /60   Pulse 84   Temp 98.1 °F (36.7 °C)   Ht 5' 8" (1.727 m)   SpO2 98%   BMI 29.04 kg/m²          Physical Exam  Vitals reviewed. Constitutional:       General: He is not in acute distress. Appearance: Normal appearance. He is not ill-appearing, toxic-appearing or diaphoretic. Cardiovascular:      Rate and Rhythm: Normal rate and regular rhythm. Heart sounds: Normal heart sounds. No murmur heard. No friction rub. No gallop. Comments: Distant heart sounds   Pulmonary:      Comments: Increased respiratory effort, trace wheezing, diminished breath sounds throughout   Skin:     General: Skin is warm. Neurological:      Mental Status: He is alert and oriented to person, place, and time.              Mary Lou Rondon, DO  [address] [facility] no... no...

## 2023-08-17 LAB
ANION GAP SERPL CALCULATED.3IONS-SCNC: 6 MMOL/L
ATRIAL RATE: 91 BPM
ATRIAL RATE: 96 BPM
BASOPHILS # BLD AUTO: 0.01 THOUSANDS/ÂΜL (ref 0–0.1)
BASOPHILS NFR BLD AUTO: 0 % (ref 0–1)
BUN SERPL-MCNC: 24 MG/DL (ref 5–25)
CALCIUM SERPL-MCNC: 8.6 MG/DL (ref 8.4–10.2)
CHLORIDE SERPL-SCNC: 99 MMOL/L (ref 96–108)
CO2 SERPL-SCNC: 34 MMOL/L (ref 21–32)
CREAT SERPL-MCNC: 1.3 MG/DL (ref 0.6–1.3)
EOSINOPHIL # BLD AUTO: 0 THOUSAND/ÂΜL (ref 0–0.61)
EOSINOPHIL NFR BLD AUTO: 0 % (ref 0–6)
ERYTHROCYTE [DISTWIDTH] IN BLOOD BY AUTOMATED COUNT: 13.9 % (ref 11.6–15.1)
GFR SERPL CREATININE-BSD FRML MDRD: 51 ML/MIN/1.73SQ M
GLUCOSE SERPL-MCNC: 142 MG/DL (ref 65–140)
HCT VFR BLD AUTO: 43.8 % (ref 36.5–49.3)
HGB BLD-MCNC: 13.5 G/DL (ref 12–17)
IMM GRANULOCYTES # BLD AUTO: 0.06 THOUSAND/UL (ref 0–0.2)
IMM GRANULOCYTES NFR BLD AUTO: 1 % (ref 0–2)
L PNEUMO1 AG UR QL IA.RAPID: NEGATIVE
LYMPHOCYTES # BLD AUTO: 0.38 THOUSANDS/ÂΜL (ref 0.6–4.47)
LYMPHOCYTES NFR BLD AUTO: 4 % (ref 14–44)
MCH RBC QN AUTO: 31 PG (ref 26.8–34.3)
MCHC RBC AUTO-ENTMCNC: 30.8 G/DL (ref 31.4–37.4)
MCV RBC AUTO: 101 FL (ref 82–98)
MONOCYTES # BLD AUTO: 0.46 THOUSAND/ÂΜL (ref 0.17–1.22)
MONOCYTES NFR BLD AUTO: 4 % (ref 4–12)
NEUTROPHILS # BLD AUTO: 9.95 THOUSANDS/ÂΜL (ref 1.85–7.62)
NEUTS SEG NFR BLD AUTO: 91 % (ref 43–75)
NRBC BLD AUTO-RTO: 0 /100 WBCS
P AXIS: 67 DEGREES
P AXIS: 76 DEGREES
PLATELET # BLD AUTO: 203 THOUSANDS/UL (ref 149–390)
PMV BLD AUTO: 11.3 FL (ref 8.9–12.7)
POTASSIUM SERPL-SCNC: 4.8 MMOL/L (ref 3.5–5.3)
PR INTERVAL: 154 MS
PR INTERVAL: 156 MS
PROCALCITONIN SERPL-MCNC: 0.08 NG/ML
QRS AXIS: 67 DEGREES
QRS AXIS: 74 DEGREES
QRSD INTERVAL: 98 MS
QRSD INTERVAL: 98 MS
QT INTERVAL: 368 MS
QT INTERVAL: 388 MS
QTC INTERVAL: 464 MS
QTC INTERVAL: 477 MS
RBC # BLD AUTO: 4.35 MILLION/UL (ref 3.88–5.62)
S PNEUM AG UR QL: NEGATIVE
SODIUM SERPL-SCNC: 139 MMOL/L (ref 135–147)
T WAVE AXIS: 26 DEGREES
T WAVE AXIS: 51 DEGREES
VENTRICULAR RATE: 91 BPM
VENTRICULAR RATE: 96 BPM
WBC # BLD AUTO: 10.86 THOUSAND/UL (ref 4.31–10.16)

## 2023-08-17 PROCEDURE — 84145 PROCALCITONIN (PCT): CPT | Performed by: PHYSICIAN ASSISTANT

## 2023-08-17 PROCEDURE — 85025 COMPLETE CBC W/AUTO DIFF WBC: CPT | Performed by: PHYSICIAN ASSISTANT

## 2023-08-17 PROCEDURE — 94664 DEMO&/EVAL PT USE INHALER: CPT

## 2023-08-17 PROCEDURE — 92610 EVALUATE SWALLOWING FUNCTION: CPT

## 2023-08-17 PROCEDURE — 99223 1ST HOSP IP/OBS HIGH 75: CPT | Performed by: INTERNAL MEDICINE

## 2023-08-17 PROCEDURE — 94640 AIRWAY INHALATION TREATMENT: CPT

## 2023-08-17 PROCEDURE — 99232 SBSQ HOSP IP/OBS MODERATE 35: CPT | Performed by: INTERNAL MEDICINE

## 2023-08-17 PROCEDURE — 93010 ELECTROCARDIOGRAM REPORT: CPT | Performed by: INTERNAL MEDICINE

## 2023-08-17 PROCEDURE — 94760 N-INVAS EAR/PLS OXIMETRY 1: CPT

## 2023-08-17 PROCEDURE — 80048 BASIC METABOLIC PNL TOTAL CA: CPT | Performed by: PHYSICIAN ASSISTANT

## 2023-08-17 RX ORDER — FUROSEMIDE 10 MG/ML
20 INJECTION INTRAMUSCULAR; INTRAVENOUS ONCE
Status: COMPLETED | OUTPATIENT
Start: 2023-08-17 | End: 2023-08-17

## 2023-08-17 RX ORDER — METHYLPREDNISOLONE SODIUM SUCCINATE 40 MG/ML
40 INJECTION, POWDER, LYOPHILIZED, FOR SOLUTION INTRAMUSCULAR; INTRAVENOUS EVERY 8 HOURS
Status: DISCONTINUED | OUTPATIENT
Start: 2023-08-17 | End: 2023-08-19 | Stop reason: HOSPADM

## 2023-08-17 RX ADMIN — IPRATROPIUM BROMIDE 0.5 MG: 0.5 SOLUTION RESPIRATORY (INHALATION) at 20:34

## 2023-08-17 RX ADMIN — LEVALBUTEROL HYDROCHLORIDE 1.25 MG: 1.25 SOLUTION RESPIRATORY (INHALATION) at 14:45

## 2023-08-17 RX ADMIN — BUDESONIDE 0.5 MG: 0.5 INHALANT ORAL at 07:28

## 2023-08-17 RX ADMIN — LEVALBUTEROL HYDROCHLORIDE 1.25 MG: 1.25 SOLUTION RESPIRATORY (INHALATION) at 20:34

## 2023-08-17 RX ADMIN — METHYLPREDNISOLONE SODIUM SUCCINATE 40 MG: 40 INJECTION, POWDER, FOR SOLUTION INTRAMUSCULAR; INTRAVENOUS at 05:36

## 2023-08-17 RX ADMIN — GABAPENTIN 300 MG: 300 CAPSULE ORAL at 21:05

## 2023-08-17 RX ADMIN — LEVALBUTEROL HYDROCHLORIDE 1.25 MG: 1.25 SOLUTION RESPIRATORY (INHALATION) at 07:28

## 2023-08-17 RX ADMIN — METHYLPREDNISOLONE SODIUM SUCCINATE 40 MG: 40 INJECTION, POWDER, FOR SOLUTION INTRAMUSCULAR; INTRAVENOUS at 14:23

## 2023-08-17 RX ADMIN — HEPARIN SODIUM 5000 UNITS: 5000 INJECTION INTRAVENOUS; SUBCUTANEOUS at 14:23

## 2023-08-17 RX ADMIN — BUDESONIDE 0.5 MG: 0.5 INHALANT ORAL at 20:34

## 2023-08-17 RX ADMIN — METHYLPREDNISOLONE SODIUM SUCCINATE 40 MG: 40 INJECTION, POWDER, FOR SOLUTION INTRAMUSCULAR; INTRAVENOUS at 21:04

## 2023-08-17 RX ADMIN — IPRATROPIUM BROMIDE 0.5 MG: 0.5 SOLUTION RESPIRATORY (INHALATION) at 07:28

## 2023-08-17 RX ADMIN — HEPARIN SODIUM 5000 UNITS: 5000 INJECTION INTRAVENOUS; SUBCUTANEOUS at 21:04

## 2023-08-17 RX ADMIN — GUAIFENESIN AND DEXTROMETHORPHAN 5 ML: 100; 10 SYRUP ORAL at 04:52

## 2023-08-17 RX ADMIN — POTASSIUM CHLORIDE 20 MEQ: 1500 TABLET, EXTENDED RELEASE ORAL at 09:17

## 2023-08-17 RX ADMIN — FAMOTIDINE 20 MG: 20 TABLET, FILM COATED ORAL at 21:05

## 2023-08-17 RX ADMIN — IPRATROPIUM BROMIDE 0.5 MG: 0.5 SOLUTION RESPIRATORY (INHALATION) at 14:45

## 2023-08-17 RX ADMIN — ASPIRIN 81 MG 81 MG: 81 TABLET ORAL at 21:05

## 2023-08-17 RX ADMIN — HEPARIN SODIUM 5000 UNITS: 5000 INJECTION INTRAVENOUS; SUBCUTANEOUS at 05:37

## 2023-08-17 RX ADMIN — PANTOPRAZOLE SODIUM 40 MG: 40 TABLET, DELAYED RELEASE ORAL at 05:37

## 2023-08-17 RX ADMIN — FUROSEMIDE 20 MG: 10 INJECTION, SOLUTION INTRAMUSCULAR; INTRAVENOUS at 16:35

## 2023-08-17 RX ADMIN — METRONIDAZOLE 500 MG: 500 INJECTION, SOLUTION INTRAVENOUS at 14:22

## 2023-08-17 RX ADMIN — ATORVASTATIN CALCIUM 80 MG: 80 TABLET, FILM COATED ORAL at 16:35

## 2023-08-17 RX ADMIN — METRONIDAZOLE 500 MG: 500 INJECTION, SOLUTION INTRAVENOUS at 05:37

## 2023-08-17 NOTE — PROGRESS NOTES
[address]  Progress Note  Name: Princess Gross  MRN: [id number]  Unit/Bed#: -01 I Date of Admission: 8/16/2023   Date of Service: 8/17/2023 I Hospital Day: 1    Assessment/Plan   * Sepsis due to pneumonia [facility])  Assessment & Plan  · Patient with suspected aspiration pneumonia  · Initial procalcitonin normal, repeat normal as well  · Strep pneumo/urine Legionella negative  · Reviewed case with pulmonology  · Low threshold to discontinue antibiotics if infectious work-up remains negative and patient remains afebrile    Acute and chronic respiratory failure with hypoxia ([address])  Assessment & Plan  · Secondary to COPD exacerbation and suspected pneumonia  · Possible component of fluid overload given bilateral effusions  · Pulmonology input appreciated  · We will give Lasix 20 mg IV x1. Will continue as needed  · Patient normally wears 3 L nasal cannula around-the-clock  · Is now requiring 4 L to maintain O2 saturation of 94%    Chronic kidney disease, stage 3a [facility])  Assessment & Plan  Lab Results   Component Value Date    EGFR 51 08/17/2023    EGFR 56 08/16/2023    EGFR 58 08/13/2023    CREATININE 1.30 08/17/2023    CREATININE 1.21 08/16/2023    CREATININE 1.18 08/13/2023     Creatinine appears to be at baseline, will continue to monitor with repeat labs in a.m. Obstructive sleep apnea syndrome in adult  Assessment & Plan  Continue prehospital CPAP nightly    LAMBERTO on CPAP  Assessment & Plan  Continue prehospital CPAP nightly    GERD (gastroesophageal reflux disease)  Assessment & Plan  Substitute Protonix 40 mg p.o. daily for prehospital Prilosec and continue prehospital Pepcid 20 mg p.o. nightly    Chronic diastolic (congestive) heart failure (HCC)  Assessment & Plan  Wt Readings from Last 3 Encounters:   08/02/23 86.6 kg (191 lb)   07/27/23 86.6 kg (191 lb)   07/19/23 89.4 kg (197 lb)     · Placed on no added salt diet  · Continue prehospital Toprol-XL 12.5 mg p.o. daily  · Continue Lasix as needed    Chronic obstructive pulmonary disease with acute exacerbation (HCC)  Assessment & Plan  · Continue Solu-Medrol 40 mg IV every 8 hours  · Continue oxygen, titrate as tolerated  · Continue nightly CPAP  · Continue nebulizers  · Pulmonology input appreciated           VTE Prophylaxis:  Heparin    Patient Centered Rounds: I have performed bedside rounds with nursing staff today. Discussions with Specialists or Other Care Team Provider: yes  Education and Discussions with Family / Patient: Updated patient regarding plan of care    Current Length of Stay: 1 day(s)    Current Patient Status: Inpatient   Certification Statement: The patient will continue to require additional inpatient hospital stay due to COPD exacerbation    Discharge Plan: Hopeful discharge home in the next 24 to 48 hours    Code Status: Level 1 - Full Code    Subjective:   No overnight events noted. Patient still with shortness of breath. Denies any chest pain. Currently afebrile    Objective:     Vitals:   Temp (24hrs), Av.1 °F (36.2 °C), Min:96.2 °F (35.7 °C), Max:97.7 °F (36.5 °C)    Temp:  [96.2 °F (35.7 °C)-97.7 °F (36.5 °C)] 97.7 °F (36.5 °C)  HR:  [] 104  Resp:  [15-20] 19  BP: ()/(57-68) 105/61  SpO2:  [91 %-100 %] 100 %  There is no height or weight on file to calculate BMI. Input and Output Summary (last 24 hours): Intake/Output Summary (Last 24 hours) at 2023 1647  Last data filed at 2023 1509  Gross per 24 hour   Intake 630 ml   Output 1450 ml   Net -820 ml       Physical Exam:   Physical Exam  Constitutional:       General: He is not in acute distress. HENT:      Head: Normocephalic and atraumatic. Nose: Nose normal.      Mouth/Throat:      Mouth: Mucous membranes are moist.   Eyes:      Extraocular Movements: Extraocular movements intact. Conjunctiva/sclera: Conjunctivae normal.   Cardiovascular:      Rate and Rhythm: Normal rate and regular rhythm. Pulmonary:      Effort: Pulmonary effort is normal. No respiratory distress. Breath sounds: Rhonchi present. Abdominal:      Palpations: Abdomen is soft. Tenderness: There is no abdominal tenderness. Musculoskeletal:         General: Normal range of motion. Cervical back: Normal range of motion and neck supple. Skin:     General: Skin is warm and dry. Neurological:      General: No focal deficit present. Mental Status: He is alert. Mental status is at baseline. Cranial Nerves: No cranial nerve deficit. Psychiatric:         Mood and Affect: Mood normal.         Behavior: Behavior normal.         Additional Data:     Labs:    Results from last 7 days   Lab Units 08/17/23  0533   WBC Thousand/uL 10.86*   HEMOGLOBIN g/dL 13.5   HEMATOCRIT % 43.8   PLATELETS Thousands/uL 203   NEUTROS PCT % 91*   LYMPHS PCT % 4*   MONOS PCT % 4   EOS PCT % 0     Results from last 7 days   Lab Units 08/17/23  0533 08/16/23  1740   SODIUM mmol/L 139 140   POTASSIUM mmol/L 4.8 4.1   CHLORIDE mmol/L 99 99   CO2 mmol/L 34* 32   BUN mg/dL 24 23   CREATININE mg/dL 1.30 1.21   CALCIUM mg/dL 8.6 9.0   ALK PHOS U/L  --  57   ALT U/L  --  36   AST U/L  --  28     Results from last 7 days   Lab Units 08/16/23  1740   INR  1.03               * I Have Reviewed All Lab Data Listed Above. * Additional Pertinent Lab Tests Reviewed:  [address] [place] [address] Admission  Reviewed    Imaging:  Imaging Reports Reviewed Today Include: No new imaging    Recent Cultures (last 7 days):     Results from last 7 days   Lab Units 08/16/23  2340   GRAM STAIN RESULT  1+ Epithelial cells per low power field*  Rare Polys*  2+ Budding Yeast with Pseudomycelia*  1+ Gram positive cocci in pairs and chains*  Rare Gram positive rods*   LEGIONELLA URINARY ANTIGEN  Negative       Last 24 Hours Medication List:   Current Facility-Administered Medications   Medication Dose Route Frequency Provider Last Rate   • acetaminophen  650 mg Oral Q6H PRN CHRISS Da SilvaC     • aspirin  81 mg Oral HS LEONCIO Da Silva-C     • atorvastatin  80 mg Oral Daily With Mita Zeng PA-C     • budesonide  0.5 mg Nebulization BID Teagan Quiroz MD     • dextromethorphan-guaiFENesin  5 mL Oral TID PRN LEONCIO Da Silva-C     • famotidine  20 mg Oral HS Denise Dowd PA-C     • furosemide  40 mg Oral Daily PRN LEONCIO Da Silva-C     • gabapentin  300 mg Oral HS LEONCIO Da Silva-C     • heparin (porcine)  5,000 Units Subcutaneous [facility] LEONCIO Da Silva-C     • HYDROcodone-acetaminophen  1 tablet Oral Q6H PRN LEONCIO Da Silva-C     • ipratropium  0.5 mg Nebulization TID CHRISS Da SilvaC     • levalbuterol  1.25 mg Nebulization TID LEONCIO Da Silva-C     • methocarbamol  500 mg Oral TID PRN LEONCIO Da Silva-C     • methylPREDNISolone sodium succinate  40 mg Intravenous Q8H Teagan Quiroz MD     • metoprolol succinate  12.5 mg Oral Daily CHRISS Da SilvaC     • ondansetron  4 mg Intravenous Q6H PRN LEONCIO Da Silva-C     • pantoprazole  40 mg Oral Early Morning LEONCIO Da Silva-C     • potassium chloride  20 mEq Oral Daily CHRISS Da SilvaC          Today, Patient Was Seen By: Teagan Quiroz MD    ** Please Note: Dictation voice to text software may have been used in the creation of this document.  **

## 2023-08-17 NOTE — PLAN OF CARE
Problem: SAFETY ADULT  Goal: Patient will remain free of falls  Description: INTERVENTIONS:  - Educate patient/family on patient safety including physical limitations  - Instruct patient to call for assistance with activity   - Consult OT/PT to assist with strengthening/mobility   - Keep Call bell within reach  - Keep bed low and locked with side rails adjusted as appropriate  - Keep care items and personal belongings within reach  - Initiate and maintain comfort rounds  - Make Fall Risk Sign visible to staff  - Consider moving patient to room near nurses station  Outcome: Progressing   Patient calls when in need of assistance

## 2023-08-17 NOTE — ASSESSMENT & PLAN NOTE
· Admit to telemetry  · Trend lactic acid and procalcitonin  · Blood cultures are currently pending  · We will obtain urine for strep pneumo and Legionella  · Concern for possible aspiration, will place on modified low-salt diet  · Continue Rocephin 1 g IV daily and Flagyl 500 mg IV every 8 hours  · Obtain swallow eval in a.m.   · Give antitussives and antipyretics as needed  · Placed on O2 and respiratory protocol  · Hydrate with normal saline 150 cc/h  · Patient met sepsis criteria in that he was hypothermic with a temperature as low as 96.2, tachycardic with a heart rate as high as 99, leukocytosis of 14.31 and a known source of infection being right lower lobe pneumonia

## 2023-08-17 NOTE — ASSESSMENT & PLAN NOTE
· Continue Solu-Medrol 40 mg IV every 8 hours  · Continue oxygen, titrate as tolerated  · Continue nightly CPAP  · Continue nebulizers  · Pulmonology input appreciated

## 2023-08-17 NOTE — ASSESSMENT & PLAN NOTE
Substitute Protonix 40 mg p.o. daily for prehospital Prilosec and continue prehospital Pepcid 20 mg p.o. nightly

## 2023-08-17 NOTE — ASSESSMENT & PLAN NOTE
Lab Results   Component Value Date    EGFR 56 08/16/2023    EGFR 58 08/13/2023    EGFR 57 08/02/2023    CREATININE 1.21 08/16/2023    CREATININE 1.18 08/13/2023    CREATININE 1.19 08/02/2023     Creatinine appears to be at baseline, will continue to monitor with repeat labs in a.m.

## 2023-08-17 NOTE — ASSESSMENT & PLAN NOTE
· We will give Solu-Medrol 40 mg IV every 8 hours  · Antibiotics as per above  · Placed on O2 and respiratory protocol  · Continue prehospital respiratory medications  · Continue nightly CPAP  · Consult pulmonary in a.m.

## 2023-08-17 NOTE — SEPSIS NOTE
Sepsis Note   Viet Villa 77 y.o. male MRN: [id number]  Unit/Bed#: -01 Encounter: [id number]      Patient met sepsis criteria in that he was hypothermic with a temperature as low as 96.2, tachycardic with a heart rate as high as 99, leukocytosis of 14.31 and a known source of infection being right lower lobe pneumonia    Sepsis order set was initiated, IV antibiotics begun and fluid resuscitation    There is no height or weight on file to calculate BMI.   Wt Readings from Last 1 Encounters:   08/02/23 86.6 kg (191 lb)     IBW (Ideal Body Weight): 68.4 kg    Ideal body weight: 68.4 kg (150 lb 12.7 oz)  Adjusted ideal body weight: 75.7 kg (166 lb 14 oz)

## 2023-08-17 NOTE — SPEECH THERAPY NOTE
Speech Language/Pathology  Speech/Language Pathology  Assessment    Patient Name: Anupam Chase  [id number] Date: 8/17/2023     Problem List  Principal Problem:    Sepsis due to pneumonia [facility])  Active Problems:    Hyperlipidemia    Hypertension    Chronic obstructive pulmonary disease with acute exacerbation (HCC)    Chronic diastolic (congestive) heart failure (HCC)    GERD (gastroesophageal reflux disease)    LAMBERTO on CPAP    Obstructive sleep apnea syndrome in adult    Chronic kidney disease, stage 3a ([address])    Acute and chronic respiratory failure with hypoxia [facility])    Past Medical History  Past Medical History:   Diagnosis Date    Abnormal ECG 2021 OR 2022    Alcoholism ([address]) 1984    sober 38 years    Arthritis 2008    beginning    Bilateral carotid artery stenosis     Callus     Cancer (HCC)     skin    Chronic diastolic heart failure (HCC)     Chronic ischemic heart disease     Chronic kidney disease     stage 3    Colon polyp     COPD (chronic obstructive pulmonary disease) ([facility])     Coronary artery disease     hx stents, MI, PCI    COVID 11/2021    CPAP (continuous positive airway pressure) dependence     Disease of thyroid gland 2017    nodules    Emphysema of lung ([address]) 1995    started    Hearing loss     History of transfusion 1995    Hyperlipidemia     Hypertension     Intracranial aneurysm 04/25/2023    Lung nodule 2023    x rays    MI (myocardial infarction) ([address]) 1995    Myocardial infarction ([address]) 1995    Pneumonia     Pneumothorax 02/20/2023    collapsed lung    Prostate cancer ([address])     RSV (respiratory syncytial virus infection) 10/2022    S/P carotid endarterectomy     Shortness of breath     O2 2 l/nc PRN    Sleep apnea     Sleep apnea, obstructive     Stented coronary artery      Past Surgical History  Past Surgical History:   Procedure Laterality Date    APPENDECTOMY      CARDIAC CATHETERIZATION  03/18/2021    left main with no significant disease, proximal LAD with 10% stenosis at the site of prior stent, left circumflex artery with minimal luminal irregularities mid RCA with 50% stenosis at site of prior stent and PL segment with distal disease supplied by collaterals from the distal circumflex with no significant CAD requiring revascularization at that time. CATARACT EXTRACTION, BILATERAL Bilateral     COLONOSCOPY      CORONARY ANGIOPLASTY WITH STENT PLACEMENT  1999    RCA    CORONARY ANGIOPLASTY WITH STENT PLACEMENT  2001    RCA    CORONARY ANGIOPLASTY WITH STENT PLACEMENT  2003    LAD    EYE SURGERY      MN BX PROSTATE STRTCTC SATURATION SAMPLING IMG GID N/A 09/13/2022    Procedure: TRANSPERINEAL MRI FUSION  BIOPSY PROSTATE;  Surgeon: Evonnie Ahumada, MD;  Location: [facility];  Service: Urology    MN NEUROPLASTY &/TRANSPOS MEDIAN NRV CARPAL Jonelle Andrés Left 07/22/2022    Procedure: RELEASE CARPAL TUNNEL;  Surgeon: Latisha Hidalgo MD;  Location: [facility];  Service: Orthopedics    MN NEUROPLASTY &/TRANSPOSITION ULNAR NERVE ELBOW Left 07/22/2022    Procedure: RELEASE CUBITAL TUNNEL;  Surgeon: Latisha Hidalgo MD;  Location[facility];  Service: Orthopedics    MN NEUROPLASTY &/TRANSPOSITION ULNAR NERVE WRIST Left 07/22/2022    Procedure: [place] RELEASE;  Surgeon: Latisha Hidalgo MD;  Location: [facility];  Service: Orthopedics    SKIN CANCER EXCISION  2012    chin-per pt, basal cell  also right ankle    TONSILLECTOMY  no          08/17/23 1600   Patient Information   Current Medical ARF   Special Studies CT, chest xray   Past Medical History COPD, O2 dependent   Swallow Information   Current Risks for Dysphagia & Aspiration Respiratory compromise   Current Symptoms/Concerns Cough;Clear throat; With food; With liquids;During meals;Current Pneumonia;HX Dysphagia   Current Diet Dysphagia mechanical soft; Nectar thick liquid   Baseline Diet Regular; Thin liquids   Baseline Assessment   Behavior/Cognition Alert; Cooperative; Interactive   Speech/Language Status [facility]   Patient Positioning Upright in bed Swallow Mechanism Exam   Labial Symmetry WFL   Labial Strength WFL   Labial ROM WFL   Labial Sensation WFL   Facial Symmetry WFL   Facial Strength WFL   Facial ROM WFL   Facial Sensation WFL   Lingual Symmetry WFL   Lingual Strength WFL   Lingual ROM WFL   Lingual Sensation WFL   Dentition Adequate   Volitional Cough Strong   Consistencies Assessed and Performance   Materials Admnistered Mechanical Soft/Level 2;Nectar thick liquid   Oral Stage WFL   Phargngeal Stage Moderate impaired   Swallow Mechanics Mild delayed; Moderate delayed;Swallow initation; Appears prompt;Weak larygneal rise;Aspiration risk   Strategies and Efficacy small bites, small sips, slow rate   Summary   Swallow Summary Patient received sitting upright in bed with both wife and daughter visiting. Patient is known to this service having been evaluated and treated during his last hospital stay in both April and May 2023. Patient had VBS completed on 6/22/23 at [facility] to determine overall swallowing safety, ability and recommendations. They were as follows: Pt presents with mild oropharyngeal dysphagia characterized by swallow initiation delay, reduced laryngeal excursion, reduced epiglottic inversion and airway closure, as well as reduced TBR and pharyngeal constriction. Use of small sips minimizes risk of asp/pen and pharyngeal retention, while use of secondary swallow improves pharyngeal clearance with large sips. Silent aspiration occurred with sequential sips of thin via straw."  Patient is a very fragile swallow circumstance, with any deviation from recommendations patient high aspiration risk. He endorses drinking thin liquids from water bottles as well as eating pizza "it gave me trouble."  Extensive education provided regarding today's evaluation. Overall doing another VBS at this time would likely yield similar result.   Imaging suggests pt with multiple infiltrates at this time in both lung bases as well as upper left bronchi rani. Recommend modified diet ongoing to ensure resp tolerance/improvement. Patient verbalized his understanding of needing to return to thick liquids at this time. ST following. Recommendations   Risk for Aspiration Present   Recommendations Consider oral diet   Diet Solid Recommendation Level 2 Dysphagia/ mechanical soft/altered   Diet Liquid Recommendation Nectar thick liquid   Recommended Form of Meds Crushed; With puree   General Precautions Aspiration precautions; Feed only when alert;Minimize distractions;Upright as possible for all oral intake;Remain upright for 45 mins after meals   Compensatory Swallowing Strategies Alternate solids and liquids; No straws   Results Reviewed with MD;RN;PT/Family/Caregiver;PAC/CRNP   Speech Therapy Prognosis   Prognosis Good   Prognosis Considerations Co-Morbidities; Medical Diagnosis

## 2023-08-17 NOTE — CONSULTS
Pulmonary Medicine-Consultation  Lashell Ellis 77 y.o. male MRN: [id number]  Unit/Bed#: -01 Encounter: [id number]      Assessment:  1. Acute on chronic respiratory failure  · Suspect exacerbation of COPD/also volume overload given the uptrending BNP, new small pleural effusion, possible aspiration pneumonia/pneumonitis  2. COPD/emphysema-with exacerbation  3. Possible aspiration pneumonia/pneumonitis  4. Bilateral pleural effusion-small on the left, small on the right  5. Mediastinal lymphadenopathy-possibly CHF related, needs outpatient follow-up  6. Systolic/diastolic CHF  7. LAMBERTO    Recommendations/discussion:  • Currently overall improving/feeling better back to baseline supplemental oxygen  • Agree with current treatment for COPD exacerbation  o Nebulized inhalers,  o Continue Solu-Medrol 40 every 8 for now given the ongoing wheezing  • May discontinue antibiotics given the negative procalcitonin x2  • Consider starting diuresis given the above, at least by restricting fluid intake/maintain negative fluid balance for the next few days    We will continue to follow    Physician Requesting Consult: Era Alvarenga, *    Reason for Consult / Principal Problem: Acute on chronic respiratory failure    HPI:   77 y.o. male with a h/o CKD, ischemic cardiomyopathy, CAD, chronic diastolic CHF, CAD, LAMBERTO/CPAP, HTN, CKD COPD/emphysema, former 70-pack-year tobacco abuse history quit in 1995, chronic hypoxemic respiratory failure. Presented to the ED 8/16 with cough/shortness of breath for few days. Noted worse hypoxemia on baseline oxygen, required up to 5 LPM supplemental oxygen. Was recently treated with steroids/antibiotics by PCP without significant improvement. CT chest showed possible aspiration pneumonia/pneumonitis bibasilar opacities more on the right/patchy GGO seen on the prior imaging was resolved. Also left mainstem broncholith. He reported some difficulty before admission.   Also reported bilateral pleural effusion more on the right, so as new right hilar/subcarinal lymph node adenopathy. Labs leukocytosis/neutrophilia, normal procalcitonin [id number] cultures negative urine antigens for strep/Legionella. Started on nebulized inhalers, ceftriaxone/Flagyl and IV steroids    On my assessment, patient states that he is feeling better compared to the admission time, still not back to baseline yet. Reports intermittent leg swelling, now still with wheezing, mild cough. No recent travel or exposure to a sick contact.     Inpatient consult to Pulmonology  Consult performed by: Vero Bowers MD  Consult ordered by: Cherl Boast, PA-C          Review of Systems  As per hpi, all other systems reviewed and were negative      Studies:    Imaging Studies: I have personally reviewed pertinent films in PACS    EKG, Pathology, and Other Studies: I have personally reviewed pertinent films in PACS    Pulmonary Results (PFTs, PSG): Reviewed    Historical Information   Past Medical History:   Diagnosis Date   • Abnormal ECG 2021 OR 2022   • Alcoholism ([address]) 1984    sober 38 years   • Arthritis 2008    beginning   • Bilateral carotid artery stenosis    • Callus    • Cancer (HCC)     skin   • Chronic diastolic heart failure ([address])    • Chronic ischemic heart disease    • Chronic kidney disease     stage 3   • Colon polyp    • COPD (chronic obstructive pulmonary disease) ([address])    • Coronary artery disease     hx stents, MI, PCI   • COVID 11/2021   • CPAP (continuous positive airway pressure) dependence    • Disease of thyroid gland 2017    nodules   • Emphysema of lung ([address]) 1995    started   • Hearing loss    • History of transfusion 1995   • Hyperlipidemia    • Hypertension    • Intracranial aneurysm 04/25/2023   • Lung nodule 2023    x rays   • MI (myocardial infarction) ([address]) 1995   • Myocardial infarction ([address]) 1995   • Pneumonia    • Pneumothorax 02/20/2023    collapsed lung   • Prostate cancer ([address])    • RSV (respiratory syncytial virus infection) 10/2022   • S/P carotid endarterectomy    • Shortness of breath     O2 2 l/nc PRN   • Sleep apnea    • Sleep apnea, obstructive    • Stented coronary artery      Past Surgical History:   Procedure Laterality Date   • APPENDECTOMY     • CARDIAC CATHETERIZATION  03/18/2021    left main with no significant disease, proximal LAD with 10% stenosis at the site of prior stent, left circumflex artery with minimal luminal irregularities mid RCA with 50% stenosis at site of prior stent and PL segment with distal disease supplied by collaterals from the distal circumflex with no significant CAD requiring revascularization at that time.    • CATARACT EXTRACTION, BILATERAL Bilateral    • COLONOSCOPY     • CORONARY ANGIOPLASTY WITH STENT PLACEMENT  1999    RCA   • CORONARY ANGIOPLASTY WITH STENT PLACEMENT  2001    RCA   • CORONARY ANGIOPLASTY WITH STENT PLACEMENT  2003    LAD   • EYE SURGERY     • LA BX PROSTATE STRTCTC SATURATION SAMPLING IMG GID N/A 09/13/2022    Procedure: TRANSPERINEAL MRI FUSION  BIOPSY PROSTATE;  Surgeon: Viet Hong MD;  Location: [facility];  Service: Urology   • LA NEUROPLASTY &/TRANSPOS MEDIAN NRV CARPAL Delpha Floro Left 07/22/2022    Procedure: RELEASE CARPAL TUNNEL;  Surgeon: Lionel Batista MD;  Location: [facility];  Service: Orthopedics   • LA NEUROPLASTY &/TRANSPOSITION ULNAR NERVE ELBOW Left 07/22/2022    Procedure: RELEASE CUBITAL TUNNEL;  Surgeon: Lionel Batista MD;  Location: [facility];  Service: Orthopedics   • LA NEUROPLASTY &/TRANSPOSITION ULNAR NERVE WRIST Left 07/22/2022    Procedure: Gina Sprang RELEASE;  Surgeon: Lionel Batista MD;  Location: [facility];  Service: Orthopedics   • SKIN CANCER EXCISION  2012    chin-per pt, basal cell  also right ankle   • TONSILLECTOMY  no     Social History   Social History     Substance and Sexual Activity   Alcohol Use Not Currently     Social History     Substance and Sexual Activity Drug Use Never     Social History     Tobacco Use   Smoking Status Former   • Packs/day: 2.00   • Years: 35.00   • Total pack years: 70.00   • Types: Cigarettes   • Start date: 1960   • Quit date: 1995   • Years since quittin.1   Smokeless Tobacco Never   Tobacco Comments    stopped smoking the day i had a heart attack     E-Cigarette/Vaping   • E-Cigarette Use Never User      E-Cigarette/Vaping Substances   • Nicotine No    • THC No    • CBD No    • Flavoring No    • Other No    • Unknown No          Family History:   Family History   Problem Relation Age of Onset   • Heart attack Mother    • Dementia Mother    • Heart failure Mother             • Heart disease Mother         heart attacks (2)   • No Known Problems Father        Meds/Allergies   all current active meds have been reviewed    Allergies   Allergen Reactions   • Lisinopril Swelling and Cough   • Tetanus Antitoxin Anaphylaxis   • Tetanus Toxoid Anaphylaxis and Swelling       Objective   Vitals: Blood pressure 105/61, pulse 104, temperature 97.7 °F (36.5 °C), temperature source Temporal, resp. rate 19, SpO2 100 %. ,There is no height or weight on file to calculate BMI. Intake/Output Summary (Last 24 hours) at 2023 1526  Last data filed at 2023 1113  Gross per 24 hour   Intake 630 ml   Output 1100 ml   Net -470 ml     Invasive Devices     Peripheral Intravenous Line  Duration           Peripheral IV 23 Right Antecubital <1 day                Physical Exam  There is no height or weight on file to calculate BMI.    Gen: not in acute distress,   Neck/Eyes: supple, PERRL  Ear: normal appearance, + hearing impairment  Nose:  normal nasal mucosa, no drainage  Chest: normal respiratory efforts, posterior crackles/mild expiratory wheeze at the apices/mid lung fields,  CV: Distant heart sounds, 1+ pitting lower extremities edema  Abdomen: soft, non tender  Extremities:  No observed deformity   Skin: unremarkable  Neuro: AAO X3, no focal motor deficit       Lab Results: I have personally reviewed pertinent lab results. None    Portions of the record may have been created with voice recognition software. Occasional wrong word or "sound a like" substitutions may have occurred due to the inherent limitations of voice recognition software. Read the chart carefully and recognize, using context, where substitutions have occurred.

## 2023-08-17 NOTE — ASSESSMENT & PLAN NOTE
The patient denies any episodes of gout. No synovitis or joint deformity on exam. CPM including restricted purine intake and report any acute gouty episodes. Further evaluation, treatment, and follow-up per orders, current med list, and patient instructions.       · Secondary to COPD exacerbation and suspected pneumonia  · Possible component of fluid overload given bilateral effusions  · Pulmonology input appreciated  · We will give Lasix 20 mg IV x1.   Will continue as needed  · Patient normally wears 3 L nasal cannula around-the-clock  · Is now requiring 4 L to maintain O2 saturation of 94%

## 2023-08-17 NOTE — RESPIRATORY THERAPY NOTE
RT Protocol Note  Rea Foy 77 y.o. male MRN: [id number]  Unit/Bed#: - Encounter: [id number]    Assessment    Active Problems: There are no active Hospital Problems.       Home Pulmonary Medications:    Home Devices/Therapy: Home O2, BiPAP/CPAP, Other (Comment) (o2 2-3l/m cont, nebs qid, mdi's bid/prn, cpap)    Past Medical History:   Diagnosis Date    Abnormal ECG  OR     Alcoholism ([address]) 1984    sober 38 years    Arthritis 2008    beginning    Bilateral carotid artery stenosis     Callus     Cancer (HCC)     skin    Chronic diastolic heart failure (HCC)     Chronic ischemic heart disease     Chronic kidney disease     stage 3    Colon polyp     COPD (chronic obstructive pulmonary disease) (HCC)     Coronary artery disease     hx stents, MI, PCI    COVID 2021    CPAP (continuous positive airway pressure) dependence     Disease of thyroid gland     nodules    Emphysema of lung ([address])     started    Hearing loss     History of transfusion     Hyperlipidemia     Hypertension     Intracranial aneurysm 2023    Lung nodule     x rays    MI (myocardial infarction) ([address])     Myocardial infarction ([address])     Pneumonia     Pneumothorax 2023    collapsed lung    Prostate cancer ([address])     RSV (respiratory syncytial virus infection) 10/2022    S/P carotid endarterectomy     Shortness of breath     O2 2 l/nc PRN    Sleep apnea     Sleep apnea, obstructive     Stented coronary artery      Social History     Socioeconomic History    Marital status: /Civil Union     Spouse name: None    Number of children: None    Years of education: None    Highest education level: None   Occupational History    None   Tobacco Use    Smoking status: Former     Packs/day: 2.00     Years: 35.00     Total pack years: 70.00     Types: Cigarettes     Start date: 1960     Quit date: 1995     Years since quittin.1    Smokeless tobacco: Never    Tobacco comments:     stopped smoking the day i had a heart attack   Vaping Use    Vaping Use: Never used   Substance and Sexual Activity    Alcohol use: Not Currently    Drug use: Never    Sexual activity: Not Currently     Partners: Female     Birth control/protection: None   Other Topics Concern    None   Social History Narrative    None     Social Determinants of Health     Financial Resource Strain: Unknown (7/13/2023)    Overall Financial Resource Strain (CARDIA)     Difficulty of Paying Living Expenses: Patient refused   Food Insecurity: No Food Insecurity (5/30/2023)    Hunger Vital Sign     Worried About Running Out of Food in the Last Year: Never true     [address] in the Last Year: Never true   Transportation Needs: Unknown (7/13/2023)    PRAPARE - Transportation     Lack of Transportation (Medical): Patient refused     Lack of Transportation (Non-Medical): Patient refused   Physical Activity: Not on file   Stress: Not on file   Social Connections: Not on file   Intimate Partner Violence: Not on file   Housing Stability: [address]  (5/30/2023)    Housing Stability Vital Sign     Unable to Pay for Housing in the Last Year: No     Number of Places Lived in the Last Year: 1     Unstable Housing in the Last Year: No       Subjective         Objective    Physical Exam:   Assessment Type: Assess only  General Appearance: Alert, Awake  Respiratory Pattern: Tachypneic, Dyspnea with exertion  Chest Assessment: Chest expansion symmetrical  Bilateral Breath Sounds: Diminished  Cough: None    Vitals:  Blood pressure 106/65, pulse 93, temperature (!) 96.2 °F (35.7 °C), resp. rate 18, SpO2 92 %. Imaging and other studies: I have personally reviewed pertinent reports.             Plan    Respiratory Plan: Home Bronchodilator Patient pathway, Vent/NIV/HFNC        Resp Comments: pt self applies own unit, h20 chamber filled and 3l/m titrated to unit

## 2023-08-17 NOTE — NUTRITION
08/17/23 1252   Biochemical Data,Medical Tests, and Procedures   Biochemical Data/Medical Tests/Procedures Lab values reviewed; Meds reviewed   Labs (Comment) 8/17/23 glucose 142   Meds (Comment) atorvastatin, pepcid, heparin, protonix, potassium chloride   Nutrition-Focused Physical Exam   Nutrition-Focused Physical Exam Findings RN skin assessment reviewed;Edema; No skin issues documented   Nutrition-Focused Physical Exam Findings +1 BL LE edema   Medical-Related Concerns CHF, acute renal failure on stage 3 CKD, GERD, LAMBERTO on CPAP, prostate cancer not on treatment   Current PO Intake   Current Diet Order Dental Soft, NTL, 4 g Na diet   Current Meal Intake Other (Comment)  (Unknown at this time.)   PES Statement   Problem No nutrition diagnosis   Recommendations/Interventions   Malnutrition/BMI Present No   Summary ST consulted. Pt was sleeping and did not wake to voice at time of visits. Chart utilized. Presents with SOB. Past medical history significant for CHF, acute renal failure on stage 3 CKD, GERD, LAMBERTO on CPAP, prostate cancer not on treatment. Weight history reviewed. No significant changes. +1 BL LE edema. No pressure areas. Prescribed a Dental Soft, NTL, 4 g Na diet. No documented meal completions at this time. Recommend PO diet per ST. RD to follow.    Interventions/Recommendations Continue current diet order;Monitor I & O's   Education Assessment   Education Education not indicated at this time   Patient Nutrition Goals   Goal Meet PO needs

## 2023-08-17 NOTE — ASSESSMENT & PLAN NOTE
Wt Readings from Last 3 Encounters:   08/02/23 86.6 kg (191 lb)   07/27/23 86.6 kg (191 lb)   07/19/23 89.4 kg (197 lb)     · Placed on no added salt diet  · Continue prehospital Toprol-XL 12.5 mg p.o. daily

## 2023-08-17 NOTE — ASSESSMENT & PLAN NOTE
Wt Readings from Last 3 Encounters:   08/02/23 86.6 kg (191 lb)   07/27/23 86.6 kg (191 lb)   07/19/23 89.4 kg (197 lb)     · Placed on no added salt diet  · Continue prehospital Toprol-XL 12.5 mg p.o. daily  · Continue Lasix as needed

## 2023-08-17 NOTE — ASSESSMENT & PLAN NOTE
Lab Results   Component Value Date    EGFR 51 08/17/2023    EGFR 56 08/16/2023    EGFR 58 08/13/2023    CREATININE 1.30 08/17/2023    CREATININE 1.21 08/16/2023    CREATININE 1.18 08/13/2023     Creatinine appears to be at baseline, will continue to monitor with repeat labs in a.m.

## 2023-08-17 NOTE — ASSESSMENT & PLAN NOTE
· Patient with suspected aspiration pneumonia  · Initial procalcitonin normal, repeat normal as well  · Strep pneumo/urine Legionella negative  · Reviewed case with pulmonology  · Low threshold to discontinue antibiotics if infectious work-up remains negative and patient remains afebrile

## 2023-08-17 NOTE — H&P
[id number] [facility]  H&P  Name: Kelle Estrella 77 y.o. male I MRN: [id number]  Unit/Bed#: -01 I Date of Admission: 8/16/2023   Date of Service: 8/16/2023 I Hospital Day: 0      Assessment/Plan   * Sepsis due to pneumonia [facility])  Assessment & Plan  · Admit to telemetry  · Trend lactic acid and procalcitonin  · Blood cultures are currently pending  · We will obtain urine for strep pneumo and Legionella  · Concern for possible aspiration, will place on modified low-salt diet  · Continue Rocephin 1 g IV daily and Flagyl 500 mg IV every 8 hours  · Obtain swallow eval in a.m. · Give antitussives and antipyretics as needed  · Placed on O2 and respiratory protocol  · Hydrate with normal saline 150 cc/h  · Patient met sepsis criteria in that he was hypothermic with a temperature as low as 96.2, tachycardic with a heart rate as high as 99, leukocytosis of 14.31 and a known source of infection being right lower lobe pneumonia    Chronic obstructive pulmonary disease with acute exacerbation (HCC)  Assessment & Plan  · We will give Solu-Medrol 40 mg IV every 8 hours  · Antibiotics as per above  · Placed on O2 and respiratory protocol  · Continue prehospital respiratory medications  · Continue nightly CPAP  · Consult pulmonary in a.m.     Acute and chronic respiratory failure with hypoxia (HCC)  Assessment & Plan  · Secondary to COPD exacerbation and right lower lobe pneumonia being treated as per above  · Patient normally wears 3 L nasal cannula around-the-clock  · Is now requiring 4 L to maintain O2 saturation of 94%    Chronic diastolic (congestive) heart failure (HCC)  Assessment & Plan  Wt Readings from Last 3 Encounters:   08/02/23 86.6 kg (191 lb)   07/27/23 86.6 kg (191 lb)   07/19/23 89.4 kg (197 lb)     · Placed on no added salt diet  · Continue prehospital Toprol-XL 12.5 mg p.o. daily    Chronic kidney disease, stage 3a [facility])  Assessment & Plan  Lab Results   Component Value Date    EGFR 56 08/16/2023    EGFR 58 08/13/2023    EGFR 57 08/02/2023    CREATININE 1.21 08/16/2023    CREATININE 1.18 08/13/2023    CREATININE 1.19 08/02/2023     Creatinine appears to be at baseline, will continue to monitor with repeat labs in a.m. Obstructive sleep apnea syndrome in adult  Assessment & Plan  Continue prehospital CPAP nightly    LAMBERTO on CPAP  Assessment & Plan  Continue prehospital CPAP nightly    GERD (gastroesophageal reflux disease)  Assessment & Plan  Substitute Protonix 40 mg p.o. daily for prehospital Prilosec and continue prehospital Pepcid 20 mg p.o. nightly    Hypertension  Assessment & Plan  Continue prehospital Toprol-XL 12.5 mg p.o. daily and Lasix 40 mg p.o. daily as needed    Hyperlipidemia  Assessment & Plan  Substitute Lipitor 80 mg p.o. daily for prehospital Crestor       VTE Prophylaxis: Heparin  Code Status: Level 1 though patient declines endotracheal intubation even if medically indicated  Discussion with family: None present at bedside at time of exam    Anticipated Length of Stay:  Patient will be admitted on an Inpatient basis with an anticipated length of stay of  > 2 midnights. Justification for Hospital Stay: Acute respiratory insufficiency secondary to COPD exacerbation and right lower lobe pneumonia, sepsis requiring IV antibiotics and IV fluid resuscitation    Total Time for Visit, including Counseling / Coordination of Care: 1 hour. Greater than 50% of this total time spent on direct patient counseling and coordination of care. Chief Complaint:   Cough and shortness of breath x4 days    History of Present Illness:    Yelena Patiño is a 77 y.o. male who presents with cough and shortness of breath x4 days. Patient presents ER for further evaluation and treatment with a history of cough and shortness of breath.   Patient has a history of COPD as well as chronic hypoxic respiratory failure normally wears 3 L nasal cannula around-the-clock states that his O2 saturations have been in the mid to high 80s at home and he has had to increase his oxygen use to 4 to 5 L get into the 90s. Patient complains of a cough which is nonproductive and he is currently being treated by his PCP for pneumonia and was recently placed on steroids as well. There is some concern based on CT imaging as well as past medical history for possible aspiration patient denies any known episodes difficulty eating or drinking and reports that he is on nectar thick liquids at home. Patient denies any fever or chills, no recent sick contacts and states that he has been using his breathing medications more often at home which have been minimally effective. Review of Systems:    Review of Systems   Constitutional: Positive for fatigue. Negative for chills and fever. Respiratory: Positive for cough and shortness of breath. Negative for wheezing. Cardiovascular: Negative for chest pain and palpitations. Gastrointestinal: Negative for abdominal pain, diarrhea, nausea and vomiting. Genitourinary: Negative for dysuria, frequency, hematuria and urgency. Neurological: Negative for weakness, light-headedness and headaches. All other systems reviewed and are negative.       Past Medical and Surgical History:     Past Medical History:   Diagnosis Date   • Abnormal ECG 2021 OR 2022   • Alcoholism ([address]) 1984    sober 45 years   • Arthritis 2008    beginning   • Bilateral carotid artery stenosis    • Callus    • Cancer (HCC)     skin   • Chronic diastolic heart failure (HCC)    • Chronic ischemic heart disease    • Chronic kidney disease     stage 3   • Colon polyp    • COPD (chronic obstructive pulmonary disease) (HCC)    • Coronary artery disease     hx stents, MI, PCI   • COVID 11/2021   • CPAP (continuous positive airway pressure) dependence    • Disease of thyroid gland 2017    nodules   • Emphysema of lung ([address]) 1995    started   • Hearing loss    • History of transfusion 1995   • Hyperlipidemia    • Hypertension    • Intracranial aneurysm 04/25/2023   • Lung nodule 2023    x rays   • MI (myocardial infarction) ([address]) 1995   • Myocardial infarction ([address]) 1995   • Pneumonia    • Pneumothorax 02/20/2023    collapsed lung   • Prostate cancer ([address])    • RSV (respiratory syncytial virus infection) 10/2022   • S/P carotid endarterectomy    • Shortness of breath     O2 2 l/nc PRN   • Sleep apnea    • Sleep apnea, obstructive    • Stented coronary artery        Past Surgical History:   Procedure Laterality Date   • APPENDECTOMY     • CARDIAC CATHETERIZATION  03/18/2021    left main with no significant disease, proximal LAD with 10% stenosis at the site of prior stent, left circumflex artery with minimal luminal irregularities mid RCA with 50% stenosis at site of prior stent and PL segment with distal disease supplied by collaterals from the distal circumflex with no significant CAD requiring revascularization at that time.    • CATARACT EXTRACTION, BILATERAL Bilateral    • COLONOSCOPY     • CORONARY ANGIOPLASTY WITH STENT PLACEMENT  1999    RCA   • CORONARY ANGIOPLASTY WITH STENT PLACEMENT  2001    RCA   • CORONARY ANGIOPLASTY WITH STENT PLACEMENT  2003    LAD   • EYE SURGERY     • ND BX PROSTATE STRTCTC SATURATION SAMPLING IMG GID N/A 09/13/2022    Procedure: TRANSPERINEAL MRI FUSION  BIOPSY PROSTATE;  Surgeon: Lottie Marmolejo MD;  Location: [facility];  Service: Urology   • ND NEUROPLASTY &/TRANSPOS MEDIAN NRV Marina Presto Left 07/22/2022    Procedure: RELEASE CARPAL TUNNEL;  Surgeon: Goran Burton MD;  Location: [facility];  Service: Orthopedics   • ND NEUROPLASTY &/TRANSPOSITION ULNAR NERVE ELBOW Left 07/22/2022    Procedure: RELEASE CUBITAL TUNNEL;  Surgeon: Goran Burton MD;  Location[facility];  Service: Orthopedics   • ND NEUROPLASTY &/TRANSPOSITION ULNAR NERVE WRIST Left 07/22/2022    Procedure: Charlotte Allamuchy;  Surgeon: Goran Burton MD;  Location: [facility];  Service: Orthopedics   • SKIN CANCER EXCISION  2012    chin-per pt, basal cell  also right ankle   • TONSILLECTOMY  no       Meds/Allergies:    Prior to Admission medications    Medication Sig Start Date End Date Taking?  Authorizing Provider   ALPRAZolam Dane Mejia) 0.25 mg tablet Take 1 tablet (0.25 mg total) by mouth 2 (two) times a day as needed for anxiety  Patient not taking: Reported on 7/5/2023 5/26/23   Rakesh Mills DO   amoxicillin-clavulanate (AUGMENTIN) 875-125 mg per tablet Take 1 tablet by mouth every 12 (twelve) hours for 7 days 8/11/23 8/18/23  Rakesh Mills DO   aspirin 81 mg chewable tablet 81 mg daily at bedtime    Historical Provider, MD   azithromycin (ZITHROMAX) 250 mg tablet Take 500 mg by mouth 3 (three) times a week Monday, Wednesday, Friday    Historical Provider, MD   budesonide (PULMICORT) 0.5 mg/2 mL nebulizer solution Take 2 mL (0.5 mg total) by nebulization 2 (two) times a day Rinse mouth after use. 7/19/23   Rakesh Mills DO   dextromethorphan-guaiFENesin (ROBITUSSIN DM)  mg/5 mL syrup Take 5 mL by mouth 3 (three) times a day as needed for cough 2/13/23   Manny Steele PA-C   famotidine (PEPCID) 20 mg tablet TAKE 1 TABLET BY MOUTH DAILY AT  BEDTIME 6/23/23   Rakesh Mills DO   formoterol (PERFOROMIST) 20 MCG/2ML nebulizer solution Take 2 mL (20 mcg total) by nebulization 2 (two) times a day 7/4/23   Rakesh Mills DO   furosemide (LASIX) 40 mg tablet Take 1 tablet (40 mg total) by mouth daily as needed (weight gain more than 3 lbs in 24 hours or worsening lower extremities edema) 7/19/23   Rakesh Mills DO   gabapentin (NEURONTIN) 300 mg capsule Take 1 capsule (300 mg total) by mouth daily at bedtime 5/3/23   Rakesh Mills DO   HYDROcodone-acetaminophen (Norco) 5-325 mg per tablet Take 1 tablet by mouth every 6 (six) hours as needed for pain Max Daily Amount: 4 tablets 6/30/23   Rakesh Mills DO   ipratropium-albuterol (DUO-NEB) 0.5-2.5 mg/3 mL nebulizer solution  8/14/23 Historical Provider, MD   methocarbamol (ROBAXIN) 500 mg tablet Take 1 tablet (500 mg total) by mouth 3 (three) times a day as needed for muscle spasms 7/23/23   Mine Pascal DO   metoprolol succinate (TOPROL-XL) 25 mg 24 hr tablet Take 0.5 tablets (12.5 mg total) by mouth daily 7/19/23   Nancy Rothman DO   naloxone [facility]) 4 mg/0.1 mL nasal spray Administer 1 spray into a nostril. If no response after 2-3 minutes, give another dose in the other nostril using a new spray. 6/30/23 6/29/24  Mine Pascal DO   omeprazole (PriLOSEC) 20 mg delayed release capsule Take 1 capsule (20 mg total) by mouth daily 8/13/23   Romayne Anda, MD   oxygen gas Inhale 2 L/min continuous 2LPM at rest and 3-4 LPM with activity per D Cedric FANG notes    Historical Provider, MD   potassium chloride (K-DUR,KLOR-CON) 20 mEq tablet Take 1 tablet (20 mEq total) by mouth daily 7/25/23   Mine Pascal DO   predniSONE 10 mg tablet Take 4 tablets (40 mg total) by mouth daily for 3 days, THEN 3 tablets (30 mg total) daily for 3 days, THEN 2 tablets (20 mg total) daily for 3 days, THEN 1 tablet (10 mg total) daily for 3 days. 8/11/23 8/23/23  Mine Pascal DO   rosuvastatin (CRESTOR) 40 MG tablet TAKE 1 TABLET DAILY  Patient taking differently: Take 40 mg by mouth daily at bedtime 11/21/22   Mine Pascal DO   Ascorbic Acid (vitamin C) 1000 MG tablet Take 1,000 mg by mouth daily  Patient not taking: No sig reported  10/15/22  Historical Provider, MD     all medications and allergies reviewed    Allergies:    Allergies   Allergen Reactions   • Lisinopril Swelling and Cough   • Tetanus Antitoxin Anaphylaxis   • Tetanus Toxoid Anaphylaxis and Swelling       Social History:     Marital Status: /Civil Union   Occupation: Retired  Patient Pre-hospital Living Situation: Resides at home  Patient Pre-hospital Level of Mobility: Full without assist  Patient Pre-hospital Diet Restrictions: Nectar thick liquids  Substance Use History:   Social History     Substance and Sexual Activity   Alcohol Use Not Currently     Social History     Tobacco Use   Smoking Status Former   • Packs/day: 2.00   • Years: 35.00   • Total pack years: 70.00   • Types: Cigarettes   • Start date: 1960   • Quit date: 1995   • Years since quittin.1   Smokeless Tobacco Never   Tobacco Comments    stopped smoking the day i had a heart attack     Social History     Substance and Sexual Activity   Drug Use Never       Family History:  I have reviewed the patient's family history    Physical Exam:     Vitals:   Blood Pressure: 111/68 (23 2305)  Pulse: 98 (23 2305)  Temperature: (!) 97.1 °F (36.2 °C) (23 2305)  Temp Source: Tympanic (23 1640)  Respirations: 18 (23 2305)  SpO2: 94 % (23 2305)    Physical Exam  Vitals and nursing note reviewed. Constitutional:       General: He is not in acute distress. Appearance: Normal appearance. He is ill-appearing. HENT:      Head: Normocephalic and atraumatic. Right Ear: Tympanic membrane normal.      Left Ear: Tympanic membrane normal.      Nose: Nose normal.      Mouth/Throat:      Mouth: Mucous membranes are moist.      Pharynx: No oropharyngeal exudate or posterior oropharyngeal erythema. Eyes:      Extraocular Movements: Extraocular movements intact. Pupils: Pupils are equal, round, and reactive to light. Cardiovascular:      Rate and Rhythm: Normal rate and regular rhythm. Pulses: Normal pulses. Heart sounds: Normal heart sounds. Pulmonary:      Effort: Pulmonary effort is normal. No respiratory distress. Breath sounds: Decreased air movement present. Decreased breath sounds and wheezing present. Abdominal:      General: Abdomen is flat. Bowel sounds are normal.      Palpations: Abdomen is soft. Musculoskeletal:         General: Normal range of motion. Cervical back: Normal range of motion and neck supple.       Right lower leg: No edema. Left lower leg: No edema. Skin:     General: Skin is warm and dry. Capillary Refill: Capillary refill takes less than 2 seconds. Neurological:      General: No focal deficit present. Mental Status: He is alert and oriented to person, place, and time. Additional Data:     Lab Results: I have personally reviewed pertinent reports. Results from last 7 days   Lab Units 08/16/23  1740   WBC Thousand/uL 14.31*   HEMOGLOBIN g/dL 14.6   HEMATOCRIT % 47.5   PLATELETS Thousands/uL 228   NEUTROS PCT % 91*   LYMPHS PCT % 4*   MONOS PCT % 4   EOS PCT % 0     Results from last 7 days   Lab Units 08/16/23  1740   SODIUM mmol/L 140   POTASSIUM mmol/L 4.1   CHLORIDE mmol/L 99   CO2 mmol/L 32   BUN mg/dL 23   CREATININE mg/dL 1.21   ANION GAP mmol/L 9   CALCIUM mg/dL 9.0   ALBUMIN g/dL 4.1   TOTAL BILIRUBIN mg/dL 0.61   ALK PHOS U/L 57   ALT U/L 36   AST U/L 28   GLUCOSE RANDOM mg/dL 116     Results from last 7 days   Lab Units 08/16/23  1740   INR  1.03             Results from last 7 days   Lab Units 08/16/23  1740   PROCALCITONIN ng/ml 0.08       Imaging: I have personally reviewed pertinent reports. XR chest 1 view portable   ED Interpretation by Rose Chirinos DO (08/16 1842)   Seems to have progressive the right lower lobe infiltrate, despite recent CAT scan suggesting improvement. EKG, Pathology, and Other Studies Reviewed on Admission:   · EKG: N/A    [facility] Care Everywhere Records Reviewed: Yes    ** Please Note: This note has been constructed using a voice recognition system.  **

## 2023-08-17 NOTE — CASE MANAGEMENT
Case Management Assessment & Discharge Planning Note    Patient name Bud Mejia  Location /-63 MRN [id number]  : 1945 Date 2023       Current Admission Date: 2023  Current Admission Diagnosis:Sepsis due to pneumonia [facility])   Patient Active Problem List    Diagnosis Date Noted   • Erectile dysfunction 2023   • Other disorders of refraction 2023   • Occlusion and stenosis of unspecified carotid artery 2023   • Dysphagia 2023   • Congestive heart failure with left ventricular systolic dysfunction (LVSD) ([address]) 2023   • Hypoxemia 2023   • Intracranial aneurysm 2023   • Allergic rhinitis, unspecified 2023   • Chronic kidney disease, stage 3a ([address]) 2023   • Generalized muscle weakness 2023   • Hypertensive heart and chronic kidney disease with heart failure and stage 1 through stage 4 chronic kidney disease, or unspecified chronic kidney disease ([address]) 2023   • Need for assistance with personal care 2023   • Other abnormalities of gait and mobility 2023   • Retention of urine, unspecified 2023   • Sudden idiopathic hearing loss, right ear 2023   • Acute and chronic respiratory failure with hypoxia ([address]) 2023   • Elevated troponin 2023   • Sepsis due to pneumonia ([address]) 2023   • Orthopnea 2023   • Lower extremity edema 2023   • Irregular heart rhythm 2023   • Leukocytosis 2023   • Left leg pain 2023   • Transient right leg weakness 2023   • Hypokalemia 2023   • Ambulatory dysfunction 2023   • COPD (chronic obstructive pulmonary disease) ([address]) 2023   • Obstructive sleep apnea syndrome in adult 10/19/2022   • Sensorineural hearing loss, bilateral 10/19/2022   • Chronic respiratory failure with hypoxia ([address]) 10/15/2022   • Prostate cancer ([address]) 2022   • Elevated MCV 2022   • Cubital tunnel syndrome on left 2022 • Carpal tunnel syndrome on left 07/22/2022   • 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   • Urinary frequency 04/27/2022   • Incomplete bladder emptying 04/27/2022   • Chronic bilateral low back pain with right-sided sciatica 04/18/2022   • Mid back pain 04/18/2022   • Thoracic radiculopathy 04/18/2022   • Chronic pain syndrome 04/18/2022   • Hoarseness or changing voice 04/14/2022   • Chronic right-sided thoracic back pain 03/05/2022   • Primary insomnia 03/02/2022   • Right hip pain 01/20/2022   • Abnormal nuclear stress test 03/18/2021   • Costochondral chest pain 01/15/2021   • Chronic obstructive pulmonary disease with acute exacerbation ([address]) 01/11/2021   • Oxygen dependent 01/11/2021   • S/P carotid endarterectomy 01/11/2021   • Stented coronary artery 01/11/2021   • Vertigo 01/11/2021   • Anisometropia 01/11/2021   • Osteoarthritis of spinal facet joint 01/11/2021   • Chronic ischemic heart disease 01/11/2021   • Chronic diastolic (congestive) heart failure ([address]) 01/11/2021   • Diverticular disease of colon 01/11/2021   • Dry eye syndrome 01/11/2021   • GERD (gastroesophageal reflux disease) 01/11/2021   • Acute hearing loss, right 01/11/2021   • Elevated PSA 01/11/2021   • Hypoxia 01/11/2021   • Lens replaced by other means 01/11/2021   • Lumbar radiculopathy 01/11/2021   • Multinodular goiter 01/11/2021   • Nuclear senile cataract 01/11/2021   • Obesity 01/11/2021   • Occlusion and stenosis of carotid artery 01/11/2021   • Osteoarthritis of hip 01/11/2021   • Prediabetes 01/11/2021   • LAMBERTO on CPAP 01/11/2021   • Solitary pulmonary nodule 01/11/2021   • Thyroid nodule 01/11/2021   • Guyon syndrome, 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  [address] [facility] FANG [address]  Phone: [phone] Fax: [phone]    [address] Delivery ([facility]) - [place], [address]  Phone: [phone] Fax: [phone]    Primary Care Provider: Gilson Ordonez DO    Primary Insurance: [facility]  Secondary Insurance:     ASSESSMENT:  [id number] [facility],[address], [address]  Representative - Spouse   Primary Phone: [phone] (Mobile)                 Advance Directives  Does patient have a [address]?: Yes  Does patient have Advance Directives?: Yes  Advance Directives: Power of  for health care, Living will  Primary Contact: Jing Lorenzo - Spouse        Readmission Root Cause  30 Day Readmission: No     Patient Information  Admitted from[de-identified] Facility  Mental Status: Alert  During Assessment patient was accompanied by: Not accompanied during assessment  Assessment information provided by[de-identified] Patient, Spouse  Primary Caregiver: Self  Support Systems: Spouse/significant other, Daughter, Family members  [facility]: [facility] do you live in?: [address] [place] [address] entry access options.  Select all that apply.: Stairs  Number of steps to enter home.: 3  Do the steps have railings?: Yes  Type of Current Residence:  (1 story home)  In the last 12 months, was there a time when you were not able to pay the mortgage or rent on time?: No  In the last 12 months, how many places have you lived?: 1  In the last 12 months, was there a time when you did not have a steady place to sleep or slept in a shelter (including now)?: No  Homeless/housing insecurity resource given?: N/A  Living Arrangements: Lives w/ Spouse/significant other  Is patient a ?: No     Activities of Daily Living Prior to Admission  Functional Status: Assistance  Completes ADLs independently?: No  Level of ADL dependence: Assistance  Ambulates independently?: No  Level of ambulatory dependence: Assistance  Does patient use assisted devices?: Yes  Assisted Devices (DME) used: Straight Bibi Simmer, home oxygen 3L [employer], CPAP  Does patient currently own DME?: Yes  Does patient have a history of Outpatient Therapy (PT/OT)?: Yes  Does the patient have a history of Short-Term Rehab?: Yes [facility])  Does patient have a history of HHC?: Yes ([facility])  Does patient currently have [address]?: No     Current Home Health Care  Type of Current Home Care Services: Home PT, Nurse visit  [address][de-identified] Other (please enter name in comment) ([address])  [address] [employer] Provider[de-identified] PCP     Patient Information Continued  Income Source: Pension/[employer]  Does patient have prescription coverage?: Yes  Within the past 12 months, you worried that your food would run out before you got the money to buy more.: Never true  Within the past 12 months, the food you bought just didn't last and you didn't have money to get more.: Never true  Food insecurity resource given?: N/A  Does patient receive dialysis treatments?: No  Does patient have a history of substance abuse?: No  Does patient have a history of Mental Health Diagnosis?: No        Means of Transportation  Means of Transport to Appts[de-identified] Family transport  In the past 12 months, has lack of transportation kept you from medical appointments or from getting medications?: No  In the past 12 months, has lack of transportation kept you from meetings, work, or from getting things needed for daily living?: No  Was application for public transport provided?: N/A           DISCHARGE DETAILS:     Discharge planning discussed with[de-identified] patient, spouse  Freedom of Choice: Yes  CM contacted family/caregiver?: Yes  Were Treatment Team discharge recommendations reviewed with patient/caregiver?: Yes  Did patient/caregiver verbalize understanding of patient care needs?: Yes  Were patient/caregiver advised of the risks associated with not following Treatment Team discharge recommendations?: Yes     Contacts  Patient Contacts: Jayne Lozada - spouse  Relationship to Patient[de-identified] Family  Contact Method: Phone  Phone Number: [phone]  Reason/Outcome: Discharge Planning       DME Referral Provided  Referral made for DME?: No        Would you like to participate in our [facility] service program?  : No - Declined     Treatment Team Recommendation: Home with [address]   Discharge Destination Plan[de-identified] Home with [address] [place] [address] at Discharge : Family  Additional Comments: CM to follow to assess for discharge needs.

## 2023-08-17 NOTE — UTILIZATION REVIEW
Initial Clinical Review    Admission: Date/Time/Statement:   Admission Orders (From admission, onward)     Ordered        08/16/23 2002  INPATIENT ADMISSION  Once                      Orders Placed This Encounter   Procedures   • INPATIENT ADMISSION     Standing Status:   Standing     Number of Occurrences:   1     Order Specific Question:   Level of Care     Answer:   Med Surg [16]     Order Specific Question:   Bed request comments     Answer:   Telemetry     Order Specific Question:   Estimated length of stay     Answer:   More than 2 Midnights     Order Specific Question:   Certification     Answer:   I certify that inpatient services are medically necessary for this patient for a duration of greater than two midnights. See H&P and MD Progress Notes for additional information about the patient's course of treatment. ED Arrival Information     Expected   8/16/2023     Arrival   8/16/2023 15:58    Acuity   Urgent            Means of arrival   Wheelchair    Escorted by   Spouse    Service   Hospitalist    Admission type   Emergency            Arrival complaint   chronic obstructive pulmonary disease with acute exacerbation           Chief Complaint   Patient presents with   • Shortness of Breath     Hx COPD, wears 3 L baseline but has been wearing 4 L today. Dx with pneumonia and given PO abx on 8/12 ,  sent by PCP for IV abx       Initial Presentation: 77 y.o. male to the ED from home with complaints of shortness of breath. Seen in the ED on 8/13, started on augmentin and tapering dose of prednisone. Admitted to inpatient for sepsis due to pneumonia, COPD with acute exacerbation. Feeling more short of breath. H/O COPD, on duonebs, 3LNC baseline which he has had to increase to 4-5LNC without improvement. Has been coughing, has chest pain. Arrives tachycardic, chronically ill appearing. Has decreased breath sounds. IN the ED, started on nebulizers, iv steroids, iv abx. WBcs 14.31.  CXR shows: progressive RLL infiltrate. Blood cultures pending. Check swallowing eval. Meeting sepsis criteria with hypothermia 96.2. Started on IV fluids. Date: 8/17   Day 2:   CUltures pending. Continue with IV abx. REquiring 4LNC. Give  IV lasix dose. Bilateral effusions. Continue with IV steroids. Rhonchi heard in lungs. PUlmonary consult: COPD exacerbation. Continue iv steroids, nebulizers inhalers. DC abx with neg procal.     Date: 8/18    Day 3: Has surpassed a 2nd midnight with active treatments and services, which include continue with iv lasix, monitor lung status.       ED Triage Vitals [08/16/23 1640]   Temperature Pulse Respirations Blood Pressure SpO2   98.2 °F (36.8 °C) 95 16 110/65 96 %      Temp Source Heart Rate Source Patient Position - Orthostatic VS BP Location FiO2 (%)   Tympanic Monitor Lying Left arm --      Pain Score       No Pain          Wt Readings from Last 1 Encounters:   08/02/23 86.6 kg (191 lb)     Additional Vital Signs:       Time Temp Pulse Resp BP MAP (mmHg) SpO2 Calculated FIO2 (%) - Nasal Cannula Nasal Cannula O2 Flow Rate (L/min) O2 Device Patient Position - Orthostatic VS   08/17/23 09:13:38 -- 93 18 108/68 81 96 % -- -- -- --   08/17/23 08:06:51 97.7 °F (36.5 °C) 99 18 111/66 81 91 % -- -- -- --   08/17/23 0747 -- -- -- -- -- 99 % -- -- -- --   08/17/23 0728 -- -- -- -- -- 98 % 32 3 L/min Nasal cannula --   08/17/23 0727 -- -- -- -- -- 98 % -- -- -- --   08/17/23 06:52:01 -- 91 18 98/64 75 96 % -- -- -- --   08/17/23 03:17:51 96.4 °F (35.8 °C) Abnormal  90 18 98/64 75 97 % -- -- -- --   08/16/23 2330 -- -- -- -- -- 94 % 32 3 L/min Nasal cannula --   08/16/23 23:05:22 97.1 °F (36.2 °C) Abnormal  98 18 111/68 82 94 % 32 3 L/min Nasal cannula --   08/16/23 2210 -- -- -- -- -- 91 % 36 4 L/min Nasal cannula --   08/16/23 21:25:21 96.2 °F (35.7 °C) Abnormal  93 18 106/65 79 92 % -- -- -- --   08/16/23 2000 -- 94 16 104/57 78 91 % -- -- -- --   08/16/23 1915 -- 99 15 125/58 84 93 % -- -- -- -- 08/16/23 1640 98.2 °F (36.8 °C) 95 16 110/65 81 96 % 36 4 L/min Nasal cannula Lying       Pertinent Labs/Diagnostic Test Results:   8/16 EKG: Sinus rhythm with occasional Premature ventricular complexes  Left atrial enlargement  Nonspecific ST and T wave abnormality  Abnormal ECG  When compared with ECG of 16-AUG-2023 16:50, (unconfirmed)  No significant change was found  XR chest 1 view portable   ED Interpretation by Stanislav Nguyen DO (08/16 1842)   Seems to have progressive the right lower lobe infiltrate, despite recent CAT scan suggesting improvement. Final Result by Rosanne Monson MD (08/17 3685)      Bibasilar infiltrates or atelectasis.                Workstation performed: [id number]           Results from last 7 days   Lab Units 08/13/23  1606   SARS-COV-2  Negative     Results from last 7 days   Lab Units 08/17/23  0533 08/16/23  1740 08/13/23  1459   WBC Thousand/uL 10.86* 14.31* 14.55*   HEMOGLOBIN g/dL 13.5 14.6 13.3   HEMATOCRIT % 43.8 47.5 44.0   PLATELETS Thousands/uL 203 228 216   NEUTROS ABS Thousands/µL 9.95* 13.00* 13.02*         Results from last 7 days   Lab Units 08/17/23  0533 08/16/23  1740 08/13/23  1459   SODIUM mmol/L 139 140 139   POTASSIUM mmol/L 4.8 4.1 4.3   CHLORIDE mmol/L 99 99 100   CO2 mmol/L 34* 32 31   ANION GAP mmol/L 6 9 8   BUN mg/dL 24 23 30*   CREATININE mg/dL 1.30 1.21 1.18   EGFR ml/min/1.73sq m 51 56 58   CALCIUM mg/dL 8.6 9.0 8.8     Results from last 7 days   Lab Units 08/16/23  1740 08/13/23  1459   AST U/L 28 23   ALT U/L 36 20   ALK PHOS U/L 57 55   TOTAL PROTEIN g/dL 6.2* 5.8*   ALBUMIN g/dL 4.1 4.0   TOTAL BILIRUBIN mg/dL 0.61 0.42         Results from last 7 days   Lab Units 08/17/23  0533 08/16/23 1740 08/13/23  1459   GLUCOSE RANDOM mg/dL 142* 116 134       Results from last 7 days   Lab Units 08/16/23  1929 08/16/23  1740 08/13/23  1713 08/13/23  1459   HS TNI 0HR ng/L  --  41  --  39   HS TNI 2HR ng/L 36  --  39  --    HSTNI D2 ng/L -5  --  0 --      Results from last 7 days   Lab Units 08/13/23  1606   D-DIMER QUANTITATIVE ug/ml FEU 0.60*     Results from last 7 days   Lab Units 08/16/23  1740   PROTIME seconds 13.7   INR  1.03   PTT seconds 25         Results from last 7 days   Lab Units 08/17/23  0533 08/16/23  1740   PROCALCITONIN ng/ml 0.08 0.08                 Results from last 7 days   Lab Units 08/13/23  1606   BNP pg/mL 583*       Results from last 7 days   Lab Units 08/16/23  2340   STREP PNEUMONIAE ANTIGEN, URINE  Negative   LEGIONELLA URINARY ANTIGEN  Negative           ED Treatment:   Medication Administration from 08/16/2023 1543 to 08/16/2023 2113       Date/Time Order Dose Route Action Comments     08/16/2023 1731 EDT methylPREDNISolone sodium succinate (Solu-MEDROL) injection 80 mg 80 mg Intravenous Given --     08/16/2023 1733 EDT albuterol inhalation solution 2.5 mg 2.5 mg Nebulization Given --     08/16/2023 1834 EDT albuterol inhalation solution 2.5 mg 2.5 mg Nebulization Given --        Past Medical History:   Diagnosis Date   • Abnormal ECG 2021 OR 2022   • Alcoholism ([address]) 1984    sober 38 years   • Arthritis 2008    beginning   • Bilateral carotid artery stenosis    • Callus    • Cancer (HCC)     skin   • Chronic diastolic heart failure (HCC)    • Chronic ischemic heart disease    • Chronic kidney disease     stage 3   • Colon polyp    • COPD (chronic obstructive pulmonary disease) (HCC)    • Coronary artery disease     hx stents, MI, PCI   • COVID 11/2021   • CPAP (continuous positive airway pressure) dependence    • Disease of thyroid gland 2017    nodules   • Emphysema of lung ([address]) 1995    started   • Hearing loss    • History of transfusion 1995   • Hyperlipidemia    • Hypertension    • Intracranial aneurysm 04/25/2023   • Lung nodule 2023    x rays   • MI (myocardial infarction) ([address]) 1995   • Myocardial infarction ([address]) 1995   • Pneumonia    • Pneumothorax 02/20/2023    collapsed lung   • Prostate cancer ([address])    • RSV (respiratory syncytial virus infection) 10/2022   • S/P carotid endarterectomy    • Shortness of breath     O2 2 l/nc PRN   • Sleep apnea    • Sleep apnea, obstructive    • Stented coronary artery        Admitting Diagnosis: SOB (shortness of breath) [R06.02]  COPD exacerbation (HCC) [J44.1]  Age/Sex: 77 y.o. male  Admission Orders:  Sputum cultures, gram stain  Tele  Up and oob  Nectar thick diet. Scheduled Medications:  aspirin, 81 mg, Oral, HS  atorvastatin, 80 mg, Oral, Daily With Dinner  budesonide, 0.5 mg, Nebulization, BID  cefTRIAXone, 1,000 mg, Intravenous, Q24H   And  metroNIDAZOLE, 500 mg, Intravenous, Q8H  famotidine, 20 mg, Oral, HS  formoterol, 20 mcg, Nebulization, BID  gabapentin, 300 mg, Oral, HS  heparin (porcine), 5,000 Units, Subcutaneous, Q8H SVETLANA  ipratropium, 0.5 mg, Nebulization, TID  levalbuterol, 1.25 mg, Nebulization, TID  methylPREDNISolone sodium succinate, 40 mg, Intravenous, Q8H  metoprolol succinate, 12.5 mg, Oral, Daily  pantoprazole, 40 mg, Oral, Early Morning  potassium chloride, 20 mEq, Oral, Daily      Continuous IV Infusions:     PRN Meds:  acetaminophen, 650 mg, Oral, Q6H PRN  dextromethorphan-guaiFENesin, 5 mL, Oral, TID PRN  furosemide, 40 mg, Oral, Daily PRN  HYDROcodone-acetaminophen, 1 tablet, Oral, Q6H PRN  methocarbamol, 500 mg, Oral, TID PRN  ondansetron, 4 mg, Intravenous, Q6H PRN        IP CONSULT TO PULMONOLOGY    Network Utilization Review Department  ATTENTION: Please call with any questions or concerns to [phone] and carefully listen to the prompts so that you are directed to the right person. All voicemails are confidential.  Mak Wang all requests for admission clinical reviews, approved or denied determinations and any other requests to dedicated fax number below belonging to the campus where the patient is receiving treatment.  List of dedicated fax numbers for the Facilities:  FACILITY NAME UR FAX NUMBER   ADMISSION DENIALS (Administrative/Medical Necessity) [address] (Maternity/NICU/Pediatrics) [address] [place] [address] [facility] [facility] [phone] [address] [facility] [address] [address] [facility] [address] [facility] [phone]

## 2023-08-17 NOTE — PLAN OF CARE

## 2023-08-17 NOTE — ASSESSMENT & PLAN NOTE
· Secondary to COPD exacerbation and right lower lobe pneumonia being treated as per above  · Patient normally wears 3 L nasal cannula around-the-clock  · Is now requiring 4 L to maintain O2 saturation of 94%

## 2023-08-18 ENCOUNTER — APPOINTMENT (INPATIENT)
Dept: NON INVASIVE DIAGNOSTICS | Facility: HOSPITAL | Age: 78
DRG: 871 | End: 2023-08-18
Payer: COMMERCIAL

## 2023-08-18 LAB
ANION GAP SERPL CALCULATED.3IONS-SCNC: 10 MMOL/L
AORTIC ROOT: 3.1 CM
AORTIC VALVE MEAN VELOCITY: 11.9 M/S
APICAL FOUR CHAMBER EJECTION FRACTION: 36 %
AV AREA BY CONTINUOUS VTI: 1.9 CM2
AV AREA PEAK VELOCITY: 1.9 CM2
AV LVOT MEAN GRADIENT: 2 MMHG
AV LVOT PEAK GRADIENT: 4 MMHG
AV MEAN GRADIENT: 6 MMHG
AV PEAK GRADIENT: 10 MMHG
AV VALVE AREA: 1.86 CM2
AV VELOCITY RATIO: 0.6
BASOPHILS # BLD MANUAL: 0 THOUSAND/UL (ref 0–0.1)
BASOPHILS NFR MAR MANUAL: 0 % (ref 0–1)
BUN SERPL-MCNC: 27 MG/DL (ref 5–25)
CALCIUM SERPL-MCNC: 8.5 MG/DL (ref 8.4–10.2)
CHLORIDE SERPL-SCNC: 100 MMOL/L (ref 96–108)
CO2 SERPL-SCNC: 28 MMOL/L (ref 21–32)
CREAT SERPL-MCNC: 1.13 MG/DL (ref 0.6–1.3)
DOP CALC AO PEAK VEL: 1.6 M/S
DOP CALC AO VTI: 32.62 CM
DOP CALC LVOT AREA: 3.14 CM2
DOP CALC LVOT CARDIAC INDEX: 2.63 L/MIN/M2
DOP CALC LVOT CARDIAC OUTPUT: 5.25 L/MIN
DOP CALC LVOT DIAMETER: 2 CM
DOP CALC LVOT PEAK VEL VTI: 19.29 CM
DOP CALC LVOT PEAK VEL: 0.96 M/S
DOP CALC LVOT STROKE INDEX: 30.5 ML/M2
DOP CALC LVOT STROKE VOLUME: 60.57
E WAVE DECELERATION TIME: 157 MS
EOSINOPHIL # BLD MANUAL: 0.19 THOUSAND/UL (ref 0–0.4)
EOSINOPHIL NFR BLD MANUAL: 1 % (ref 0–6)
ERYTHROCYTE [DISTWIDTH] IN BLOOD BY AUTOMATED COUNT: 13.9 % (ref 11.6–15.1)
FRACTIONAL SHORTENING: 16 (ref 28–44)
GFR SERPL CREATININE-BSD FRML MDRD: 61 ML/MIN/1.73SQ M
GLUCOSE SERPL-MCNC: 148 MG/DL (ref 65–140)
HCT VFR BLD AUTO: 44 % (ref 36.5–49.3)
HGB BLD-MCNC: 13.5 G/DL (ref 12–17)
INTERVENTRICULAR SEPTUM IN DIASTOLE (PARASTERNAL SHORT AXIS VIEW): 1.2 CM
INTERVENTRICULAR SEPTUM: 1.2 CM (ref 0.6–1.1)
LAAS-AP2: 23.2 CM2
LAAS-AP4: 23 CM2
LEFT ATRIUM SIZE: 4.9 CM
LEFT ATRIUM VOLUME (MOD BIPLANE): 82 ML
LEFT INTERNAL DIMENSION IN SYSTOLE: 4.9 CM (ref 2.1–4)
LEFT VENTRICULAR INTERNAL DIMENSION IN DIASTOLE: 5.8 CM (ref 3.5–6)
LEFT VENTRICULAR POSTERIOR WALL IN END DIASTOLE: 1 CM
LEFT VENTRICULAR STROKE VOLUME: 53 ML
LVSV (TEICH): 53 ML
LYMPHOCYTES # BLD AUTO: 1.15 THOUSAND/UL (ref 0.6–4.47)
LYMPHOCYTES # BLD AUTO: 6 % (ref 14–44)
MAGNESIUM SERPL-MCNC: 2.4 MG/DL (ref 1.9–2.7)
MCH RBC QN AUTO: 30.7 PG (ref 26.8–34.3)
MCHC RBC AUTO-ENTMCNC: 30.7 G/DL (ref 31.4–37.4)
MCV RBC AUTO: 100 FL (ref 82–98)
MONOCYTES # BLD AUTO: 0.77 THOUSAND/UL (ref 0–1.22)
MONOCYTES NFR BLD: 4 % (ref 4–12)
MV E'TISSUE VEL-SEP: 7 CM/S
MV PEAK A VEL: 0.92 M/S
MV PEAK E VEL: 100 CM/S
MV STENOSIS PRESSURE HALF TIME: 46 MS
MV VALVE AREA P 1/2 METHOD: 4.78
NEUTROPHILS # BLD MANUAL: 17.09 THOUSAND/UL (ref 1.85–7.62)
NEUTS BAND NFR BLD MANUAL: 3 % (ref 0–8)
NEUTS SEG NFR BLD AUTO: 86 % (ref 43–75)
PLATELET # BLD AUTO: 219 THOUSANDS/UL (ref 149–390)
PLATELET BLD QL SMEAR: ADEQUATE
PMV BLD AUTO: 11.5 FL (ref 8.9–12.7)
POTASSIUM SERPL-SCNC: 4.2 MMOL/L (ref 3.5–5.3)
RA PRESSURE ESTIMATED: 10 MMHG
RBC # BLD AUTO: 4.4 MILLION/UL (ref 3.88–5.62)
RBC MORPH BLD: NORMAL
RIGHT ATRIUM AREA SYSTOLE A4C: 15.6 CM2
RIGHT VENTRICLE ID DIMENSION: 3.4 CM
RV PSP: 46 MMHG
SL CV LEFT ATRIUM LENGTH A2C: 5.1 CM
SL CV LV EF: 35
SL CV PED ECHO LEFT VENTRICLE DIASTOLIC VOLUME (MOD BIPLANE) 2D: 168 ML
SL CV PED ECHO LEFT VENTRICLE SYSTOLIC VOLUME (MOD BIPLANE) 2D: 115 ML
SODIUM SERPL-SCNC: 138 MMOL/L (ref 135–147)
TR MAX PG: 36 MMHG
TR PEAK VELOCITY: 3 M/S
TRICUSPID ANNULAR PLANE SYSTOLIC EXCURSION: 1.8 CM
TRICUSPID VALVE PEAK REGURGITATION VELOCITY: 3 M/S
WBC # BLD AUTO: 19.2 THOUSAND/UL (ref 4.31–10.16)

## 2023-08-18 PROCEDURE — 85027 COMPLETE CBC AUTOMATED: CPT | Performed by: INTERNAL MEDICINE

## 2023-08-18 PROCEDURE — 83735 ASSAY OF MAGNESIUM: CPT | Performed by: INTERNAL MEDICINE

## 2023-08-18 PROCEDURE — 93306 TTE W/DOPPLER COMPLETE: CPT | Performed by: INTERNAL MEDICINE

## 2023-08-18 PROCEDURE — 94664 DEMO&/EVAL PT USE INHALER: CPT

## 2023-08-18 PROCEDURE — 80048 BASIC METABOLIC PNL TOTAL CA: CPT | Performed by: INTERNAL MEDICINE

## 2023-08-18 PROCEDURE — C8929 TTE W OR WO FOL WCON,DOPPLER: HCPCS

## 2023-08-18 PROCEDURE — 99232 SBSQ HOSP IP/OBS MODERATE 35: CPT | Performed by: INTERNAL MEDICINE

## 2023-08-18 PROCEDURE — 94760 N-INVAS EAR/PLS OXIMETRY 1: CPT

## 2023-08-18 PROCEDURE — 94640 AIRWAY INHALATION TREATMENT: CPT

## 2023-08-18 PROCEDURE — 85007 BL SMEAR W/DIFF WBC COUNT: CPT | Performed by: INTERNAL MEDICINE

## 2023-08-18 RX ORDER — FUROSEMIDE 10 MG/ML
20 INJECTION INTRAMUSCULAR; INTRAVENOUS ONCE
Status: COMPLETED | OUTPATIENT
Start: 2023-08-18 | End: 2023-08-18

## 2023-08-18 RX ADMIN — LEVALBUTEROL HYDROCHLORIDE 1.25 MG: 1.25 SOLUTION RESPIRATORY (INHALATION) at 19:35

## 2023-08-18 RX ADMIN — METHYLPREDNISOLONE SODIUM SUCCINATE 40 MG: 40 INJECTION, POWDER, FOR SOLUTION INTRAMUSCULAR; INTRAVENOUS at 22:09

## 2023-08-18 RX ADMIN — PERFLUTREN 0.4 ML/MIN: 6.52 INJECTION, SUSPENSION INTRAVENOUS at 07:40

## 2023-08-18 RX ADMIN — HEPARIN SODIUM 5000 UNITS: 5000 INJECTION INTRAVENOUS; SUBCUTANEOUS at 14:32

## 2023-08-18 RX ADMIN — IPRATROPIUM BROMIDE 0.5 MG: 0.5 SOLUTION RESPIRATORY (INHALATION) at 13:23

## 2023-08-18 RX ADMIN — GABAPENTIN 300 MG: 300 CAPSULE ORAL at 22:09

## 2023-08-18 RX ADMIN — METHYLPREDNISOLONE SODIUM SUCCINATE 40 MG: 40 INJECTION, POWDER, FOR SOLUTION INTRAMUSCULAR; INTRAVENOUS at 14:33

## 2023-08-18 RX ADMIN — LEVALBUTEROL HYDROCHLORIDE 1.25 MG: 1.25 SOLUTION RESPIRATORY (INHALATION) at 08:23

## 2023-08-18 RX ADMIN — IPRATROPIUM BROMIDE 0.5 MG: 0.5 SOLUTION RESPIRATORY (INHALATION) at 19:35

## 2023-08-18 RX ADMIN — ASPIRIN 81 MG 81 MG: 81 TABLET ORAL at 22:09

## 2023-08-18 RX ADMIN — BUDESONIDE 0.5 MG: 0.5 INHALANT ORAL at 08:39

## 2023-08-18 RX ADMIN — FUROSEMIDE 20 MG: 10 INJECTION, SOLUTION INTRAMUSCULAR; INTRAVENOUS at 08:57

## 2023-08-18 RX ADMIN — ATORVASTATIN CALCIUM 80 MG: 80 TABLET, FILM COATED ORAL at 17:03

## 2023-08-18 RX ADMIN — POTASSIUM CHLORIDE 20 MEQ: 1500 TABLET, EXTENDED RELEASE ORAL at 08:12

## 2023-08-18 RX ADMIN — BUDESONIDE 0.5 MG: 0.5 INHALANT ORAL at 19:35

## 2023-08-18 RX ADMIN — HEPARIN SODIUM 5000 UNITS: 5000 INJECTION INTRAVENOUS; SUBCUTANEOUS at 22:10

## 2023-08-18 RX ADMIN — METHYLPREDNISOLONE SODIUM SUCCINATE 40 MG: 40 INJECTION, POWDER, FOR SOLUTION INTRAMUSCULAR; INTRAVENOUS at 05:01

## 2023-08-18 RX ADMIN — FAMOTIDINE 20 MG: 20 TABLET, FILM COATED ORAL at 22:09

## 2023-08-18 RX ADMIN — HEPARIN SODIUM 5000 UNITS: 5000 INJECTION INTRAVENOUS; SUBCUTANEOUS at 05:01

## 2023-08-18 RX ADMIN — IPRATROPIUM BROMIDE 0.5 MG: 0.5 SOLUTION RESPIRATORY (INHALATION) at 08:23

## 2023-08-18 RX ADMIN — PANTOPRAZOLE SODIUM 40 MG: 40 TABLET, DELAYED RELEASE ORAL at 05:01

## 2023-08-18 RX ADMIN — LEVALBUTEROL HYDROCHLORIDE 1.25 MG: 1.25 SOLUTION RESPIRATORY (INHALATION) at 13:23

## 2023-08-18 NOTE — ASSESSMENT & PLAN NOTE
Wt Readings from Last 3 Encounters:   08/18/23 86.6 kg (191 lb)   08/02/23 86.6 kg (191 lb)   07/27/23 86.6 kg (191 lb)     · Placed on no added salt diet  · Continue prehospital Toprol-XL 12.5 mg p.o. daily  · Continue Lasix as needed

## 2023-08-18 NOTE — ASSESSMENT & PLAN NOTE
· Patient with suspected aspiration pneumonia  · Initial procalcitonin normal, repeat normal as well  · Strep pneumo/urine Legionella negative  · Reviewed case with pulmonology  · Antibiotics have been discontinued per pulmonology recommendation  · Follow-up sputum culture

## 2023-08-18 NOTE — ASSESSMENT & PLAN NOTE
Lab Results   Component Value Date    EGFR 61 08/18/2023    EGFR 51 08/17/2023    EGFR 56 08/16/2023    CREATININE 1.13 08/18/2023    CREATININE 1.30 08/17/2023    CREATININE 1.21 08/16/2023     Creatinine appears to be at baseline, will continue to monitor with repeat labs in a.m.

## 2023-08-18 NOTE — PROGRESS NOTES
[id number] [facility]  Progress Note  Name: Yisel Levy  MRN: [id number]  Unit/Bed#: -01 I Date of Admission: 8/16/2023   Date of Service: 8/18/2023 I Hospital Day: 2    Assessment/Plan   * Sepsis due to pneumonia [facility])  Assessment & Plan  · Patient with suspected aspiration pneumonia  · Initial procalcitonin normal, repeat normal as well  · Strep pneumo/urine Legionella negative  · Reviewed case with pulmonology  · Antibiotics have been discontinued per pulmonology recommendation  · Follow-up sputum culture    Acute and chronic respiratory failure with hypoxia ([address])  Assessment & Plan  · Secondary to COPD exacerbation and suspected pneumonia  · Possible component of fluid overload given bilateral effusions  · Pulmonology input appreciated  · Will continue IV Lasix as needed  · Patient normally wears 3 L nasal cannula around-the-clock    Chronic kidney disease, stage 3a [facility])  Assessment & Plan  Lab Results   Component Value Date    EGFR 61 08/18/2023    EGFR 51 08/17/2023    EGFR 56 08/16/2023    CREATININE 1.13 08/18/2023    CREATININE 1.30 08/17/2023    CREATININE 1.21 08/16/2023     Creatinine appears to be at baseline, will continue to monitor with repeat labs in a.m.     LAMBERTO on CPAP  Assessment & Plan  Continue prehospital CPAP nightly    Chronic diastolic (congestive) heart failure (HCC)  Assessment & Plan  Wt Readings from Last 3 Encounters:   08/18/23 86.6 kg (191 lb)   08/02/23 86.6 kg (191 lb)   07/27/23 86.6 kg (191 lb)     · Placed on no added salt diet  · Continue prehospital Toprol-XL 12.5 mg p.o. daily  · Continue Lasix as needed    Chronic obstructive pulmonary disease with acute exacerbation (HCC)  Assessment & Plan  · Continue Solu-Medrol 40 mg IV every 8 hours  · Continue oxygen, titrate as tolerated  · Continue nightly CPAP  · Continue nebulizers  · Pulmonology input appreciated    Hypertension  Assessment & Plan  Continue prehospital Toprol-XL 12.5 mg p.o. daily  Lasix as needed           VTE Prophylaxis:  Heparin    Patient Centered Rounds: I have performed bedside rounds with nursing staff today. Discussions with Specialists or Other Care Team Provider: yes  Education and Discussions with Family / Patient: Attempted to call patient's daughter Karyle Oats at number provided in chart with no answer    Current Length of Stay: 2 day(s)    Current Patient Status: Inpatient   Certification Statement: The patient will continue to require additional inpatient hospital stay due to COPD exacerbation    Discharge Plan: Hopeful discharge planning in the next 24 to 48 hours    Code Status: Level 1 - Full Code    Subjective:   No overnight events noted. Patient received IV Lasix yesterday, with appropriate response with urine output. We will give another dose today. States he is feeling better today    Objective:     Vitals:   Temp (24hrs), Av.7 °F (36.5 °C), Min:97.6 °F (36.4 °C), Max:97.7 °F (36.5 °C)    Temp:  [97.6 °F (36.4 °C)-97.7 °F (36.5 °C)] 97.7 °F (36.5 °C)  HR:  [] 103  Resp:  [18-20] 18  BP: (102-124)/(57-69) 106/67  SpO2:  [86 %-100 %] 100 %  Body mass index is 29.04 kg/m². Input and Output Summary (last 24 hours): Intake/Output Summary (Last 24 hours) at 2023 0913  Last data filed at 2023 0813  Gross per 24 hour   Intake 620 ml   Output 1000 ml   Net -380 ml       Physical Exam:   Physical Exam  Constitutional:       General: He is not in acute distress. HENT:      Head: Normocephalic and atraumatic. Nose: Nose normal.      Mouth/Throat:      Mouth: Mucous membranes are moist.   Eyes:      Extraocular Movements: Extraocular movements intact. Conjunctiva/sclera: Conjunctivae normal.   Cardiovascular:      Rate and Rhythm: Normal rate and regular rhythm. Pulmonary:      Effort: Pulmonary effort is normal. No respiratory distress. Breath sounds: Wheezing and rhonchi present. Abdominal:      Palpations: Abdomen is soft. Tenderness: There is no abdominal tenderness. Musculoskeletal:         General: Normal range of motion. Cervical back: Normal range of motion and neck supple. Skin:     General: Skin is warm and dry. Neurological:      General: No focal deficit present. Mental Status: He is alert. Mental status is at baseline. Cranial Nerves: No cranial nerve deficit. Psychiatric:         Mood and Affect: Mood normal.         Behavior: Behavior normal.         Additional Data:     Labs:    Results from last 7 days   Lab Units 08/18/23  0441 08/17/23  0533   WBC Thousand/uL 19.20* 10.86*   HEMOGLOBIN g/dL 13.5 13.5   HEMATOCRIT % 44.0 43.8   PLATELETS Thousands/uL 219 203   NEUTROS PCT %  --  91*   LYMPHS PCT %  --  4*   LYMPHO PCT % 6*  --    MONOS PCT %  --  4   MONO PCT % 4  --    EOS PCT % 1 0     Results from last 7 days   Lab Units 08/18/23  0441 08/17/23  0533 08/16/23  1740   SODIUM mmol/L 138   < > 140   POTASSIUM mmol/L 4.2   < > 4.1   CHLORIDE mmol/L 100   < > 99   CO2 mmol/L 28   < > 32   BUN mg/dL 27*   < > 23   CREATININE mg/dL 1.13   < > 1.21   CALCIUM mg/dL 8.5   < > 9.0   ALK PHOS U/L  --   --  57   ALT U/L  --   --  36   AST U/L  --   --  28    < > = values in this interval not displayed. Results from last 7 days   Lab Units 08/16/23  1740   INR  1.03               * I Have Reviewed All Lab Data Listed Above. * Additional Pertinent Lab Tests Reviewed:  [address] [place] [address] Admission  Reviewed    Imaging:  Imaging Reports Reviewed Today Include: No new imaging    Recent Cultures (last 7 days):     Results from last 7 days   Lab Units 08/16/23  2340   GRAM STAIN RESULT  1+ Epithelial cells per low power field*  Rare Polys*  2+ Budding Yeast with Pseudomycelia*  1+ Gram positive cocci in pairs and chains*  Rare Gram positive rods*   LEGIONELLA URINARY ANTIGEN  Negative       Last 24 Hours Medication List:   Current Facility-Administered Medications   Medication Dose Route Frequency Provider Last Rate   • acetaminophen  650 mg Oral Q6H PRN Hildegarde Meth, PA-C     • aspirin  81 mg Oral HS Hildegarde Meth, PA-C     • atorvastatin  80 mg Oral Daily With Rose Nye PA-C     • budesonide  0.5 mg Nebulization BID Era Alvarenga MD     • dextromethorphan-guaiFENesin  5 mL Oral TID PRN Hildegarde Meth, PA-C     • famotidine  20 mg Oral HS Hildegarde Meth, PA-C     • gabapentin  300 mg Oral HS Hildegarde Meth, PA-C     • heparin (porcine)  5,000 Units Subcutaneous Pending sale to [facility] Hildegarde Meth, PA-C     • HYDROcodone-acetaminophen  1 tablet Oral Q6H PRN Hildegarde Meth, PA-C     • ipratropium  0.5 mg Nebulization TID Hildegarde Meth, PA-C     • levalbuterol  1.25 mg Nebulization TID Hildegarde Meth, PA-C     • methocarbamol  500 mg Oral TID PRN Hildegarde Meth, PA-C     • methylPREDNISolone sodium succinate  40 mg Intravenous Q8H Era Alvarenga MD     • metoprolol succinate  12.5 mg Oral Daily Hildegarde Meth, PA-C     • ondansetron  4 mg Intravenous Q6H PRN Hildegarde Meth, PA-C     • pantoprazole  40 mg Oral Early Morning Hildegarde Meth, PA-C     • potassium chloride  20 mEq Oral Daily Hildegarde Meth, PA-C          Today, Patient Was Seen By: Era Alvarenga MD    ** Please Note: Dictation voice to text software may have been used in the creation of this document.  **

## 2023-08-18 NOTE — PLAN OF CARE
Problem: Potential for Falls  Goal: Patient will remain free of falls  Description: INTERVENTIONS:  - Educate patient/family on patient safety including physical limitations  - Instruct patient to call for assistance with activity   - Consult OT/PT to assist with strengthening/mobility   - Keep Call bell within reach  - Keep bed low and locked with side rails adjusted as appropriate  - Keep care items and personal belongings within reach  - Initiate and maintain comfort rounds  - Make Fall Risk Sign visible to staff  - Offer Toileting  - Initiate/Maintain  - Obtain necessary fall risk management equipmen  - Apply yellow socks and bracelet for high fall risk patients  - Consider moving patient to room near nurses station  Outcome: Progressing     Problem: INFECTION - ADULT  Goal: Absence or prevention of progression during hospitalization  Description: INTERVENTIONS:  - Assess and monitor for signs and symptoms of infection  - Monitor lab/diagnostic results  - Monitor all insertion sites, i.e. indwelling lines, tubes, and drains  - Monitor endotracheal if appropriate and nasal secretions for changes in amount and color  - Sparks appropriate cooling/warming therapies per order  - Administer medications as ordered  - Instruct and encourage patient and family to use good hand hygiene technique  - Identify and instruct in appropriate isolation precautions for identified infection/condition  Outcome: Progressing     Problem: SAFETY ADULT  Goal: Patient will remain free of falls  Description: INTERVENTIONS:  - Educate patient/family on patient safety including physical limitations  - Instruct patient to call for assistance with activity   - Consult OT/PT to assist with strengthening/mobility   - Keep Call bell within reach  - Keep bed low and locked with side rails adjusted as appropriate  - Keep care items and personal belongings within reach  - Initiate and maintain comfort rounds  - Make Fall Risk Sign visible to staff  - Offer Toileting   - Obtain necessary fall risk management equipment:   - Apply yellow socks and bracelet for high fall risk patients  - Consider moving patient to room near nurses station  Outcome: Progressing

## 2023-08-18 NOTE — ASSESSMENT & PLAN NOTE
· Secondary to COPD exacerbation and suspected pneumonia  · Possible component of fluid overload given bilateral effusions  · Pulmonology input appreciated  · Will continue IV Lasix as needed  · Patient normally wears 3 L nasal cannula around-the-clock

## 2023-08-18 NOTE — RESPIRATORY THERAPY NOTE
08/18/23 1323   Respiratory Assessment   Resp Comments Patient reports he was assisted to the bathroom, desaturated on 3L and Nursing turned his oxygen up to 4L.    Oxygen Therapy/Pulse Ox   O2 Device Nasal cannula   Nasal Cannula O2 Flow Rate (L/min) 4 L/min   Calculated FIO2 (%) - Nasal Cannula 36   SpO2 96 %   SpO2 Activity At Rest   $ Pulse Oximetry Spot Check Charge Completed

## 2023-08-18 NOTE — PLAN OF CARE
Problem: Potential for Falls  Goal: Patient will remain free of falls  Description: INTERVENTIONS:  - Educate patient/family on patient safety including physical limitations  - Instruct patient to call for assistance with activity   - Consult OT/PT to assist with strengthening/mobility   - Keep Call bell within reach  - Keep bed low and locked with side rails adjusted as appropriate  - Keep care items and personal belongings within reach  - Initiate and maintain comfort rounds  - Make Fall Risk Sign visible to staff  - Offer Toileting every 2 Hours, in advance of need  - Initiate/Maintain bed alarm  - Obtain necessary fall risk management equipment  - Apply yellow socks and bracelet for high fall risk patients  - Consider moving patient to room near nurses station  Outcome: Progressing     Problem: MOBILITY - ADULT  Goal: Maintain or return to baseline ADL function  Description: INTERVENTIONS:  - Educate patient/family on patient safety including physical limitations  - Instruct patient to call for assistance with activity   - Consult OT/PT to assist with strengthening/mobility   - Keep Call bell within reach  - Keep bed low and locked with side rails adjusted as appropriate  - Keep care items and personal belongings within reach  - Initiate and maintain comfort rounds  - Make Fall Risk Sign visible to staff  - Apply yellow socks and bracelet for high fall risk patients  - Consider moving patient to room near nurses station  Outcome: Progressing  Goal: Maintains/Returns to pre admission functional level  Description: INTERVENTIONS:  - Perform BMAT or MOVE assessment daily.   - Set and communicate daily mobility goal to care team and patient/family/caregiver.    - Collaborate with rehabilitation services on mobility goals if consulted  - Out of bed for toileting  - Record patient progress and toleration of activity level   Outcome: Progressing     Problem: PAIN - ADULT  Goal: Verbalizes/displays adequate comfort level or baseline comfort level  Description: Interventions:  - Encourage patient to monitor pain and request assistance  - Assess pain using appropriate pain scale  - Administer analgesics based on type and severity of pain and evaluate response  - Implement non-pharmacological measures as appropriate and evaluate response  - Consider cultural and social influences on pain and pain management  - Notify physician/advanced practitioner if interventions unsuccessful or patient reports new pain  Outcome: Progressing     Problem: INFECTION - ADULT  Goal: Absence or prevention of progression during hospitalization  Description: INTERVENTIONS:  - Assess and monitor for signs and symptoms of infection  - Monitor lab/diagnostic results  - Monitor all insertion sites, i.e. indwelling lines, tubes, and drains  - Monitor endotracheal if appropriate and nasal secretions for changes in amount and color  - Omega appropriate cooling/warming therapies per order  - Administer medications as ordered  - Instruct and encourage patient and family to use good hand hygiene technique  - Identify and instruct in appropriate isolation precautions for identified infection/condition  Outcome: Progressing     Problem: SAFETY ADULT  Goal: Patient will remain free of falls  Description: INTERVENTIONS:  - Educate patient/family on patient safety including physical limitations  - Instruct patient to call for assistance with activity   - Consult OT/PT to assist with strengthening/mobility   - Keep Call bell within reach  - Keep bed low and locked with side rails adjusted as appropriate  - Keep care items and personal belongings within reach  - Initiate and maintain comfort rounds  - Make Fall Risk Sign visible to staff  - Offer Toileting every 2 Hours, in advance of need  - Initiate/Maintain bed alarm  - Obtain necessary fall risk management equipment  - Apply yellow socks and bracelet for high fall risk patients  - Consider moving patient to room near nurses station  Outcome: Progressing  Goal: Maintain or return to baseline ADL function  Description: INTERVENTIONS:  - Educate patient/family on patient safety including physical limitations  - Instruct patient to call for assistance with activity   - Consult OT/PT to assist with strengthening/mobility   - Keep Call bell within reach  - Keep bed low and locked with side rails adjusted as appropriate  - Keep care items and personal belongings within reach  - Initiate and maintain comfort rounds  - Make Fall Risk Sign visible to staff  - Apply yellow socks and bracelet for high fall risk patients  - Consider moving patient to room near nurses station  Outcome: Progressing  Goal: Maintains/Returns to pre admission functional level  Description: INTERVENTIONS:  - Perform BMAT or MOVE assessment daily.   - Set and communicate daily mobility goal to care team and patient/family/caregiver.    - Collaborate with rehabilitation services on mobility goals if consulted  - Out of bed for toileting  - Record patient progress and toleration of activity level   Outcome: Progressing     Problem: DISCHARGE PLANNING  Goal: Discharge to home or other facility with appropriate resources  Description: INTERVENTIONS:  - Identify barriers to discharge w/patient and caregiver  - Arrange for needed discharge resources and transportation as appropriate  - Identify discharge learning needs (meds, wound care, etc.)  - Arrange for interpretive services to assist at discharge as needed  - Refer to Case Management Department for coordinating discharge planning if the patient needs post-hospital services based on physician/advanced practitioner order or complex needs related to functional status, cognitive ability, or social support system  Outcome: Progressing     Problem: Knowledge Deficit  Goal: Patient/family/caregiver demonstrates understanding of disease process, treatment plan, medications, and discharge instructions  Description: Complete learning assessment and assess knowledge base.   Interventions:  - Provide teaching at level of understanding  - Provide teaching via preferred learning methods  Outcome: Progressing     Problem: CARDIOVASCULAR - ADULT  Goal: Maintains optimal cardiac output and hemodynamic stability  Description: INTERVENTIONS:  - Monitor I/O, vital signs and rhythm  - Monitor for S/S and trends of decreased cardiac output  - Administer and titrate ordered vasoactive medications to optimize hemodynamic stability  - Assess quality of pulses, skin color and temperature  - Assess for signs of decreased coronary artery perfusion  - Instruct patient to report change in severity of symptoms  Outcome: Progressing  Goal: Absence of cardiac dysrhythmias or at baseline rhythm  Description: INTERVENTIONS:  - Continuous cardiac monitoring, vital signs, obtain 12 lead EKG if ordered  - Administer antiarrhythmic and heart rate control medications as ordered  - Monitor electrolytes and administer replacement therapy as ordered  Outcome: Progressing     Problem: RESPIRATORY - ADULT  Goal: Achieves optimal ventilation and oxygenation  Description: INTERVENTIONS:  - Assess for changes in respiratory status  - Assess for changes in mentation and behavior  - Position to facilitate oxygenation and minimize respiratory effort  - Oxygen administered by appropriate delivery if ordered  - Initiate smoking cessation education as indicated  - Encourage broncho-pulmonary hygiene including cough, deep breathe, Incentive Spirometry  - Assess the need for suctioning and aspirate as needed  - Assess and instruct to report SOB or any respiratory difficulty  - Respiratory Therapy support as indicated  Outcome: Progressing     Problem: GASTROINTESTINAL - ADULT  Goal: Maintains adequate nutritional intake  Description: INTERVENTIONS:  - Monitor percentage of each meal consumed  - Identify factors contributing to decreased intake, treat as appropriate  - Assist with meals as needed  - Monitor I&O, weight, and lab values if indicated  - Obtain nutrition services referral as needed  Outcome: Progressing     Problem: GENITOURINARY - ADULT  Goal: Absence of urinary retention  Description: INTERVENTIONS:  - Assess patient's ability to void and empty bladder  - Monitor I/O  - Bladder scan as needed  - Discuss with physician/AP medications to alleviate retention as needed  - Discuss catheterization for long term situations as appropriate  Outcome: Progressing     Problem: METABOLIC, FLUID AND ELECTROLYTES - ADULT  Goal: Electrolytes maintained within normal limits  Description: INTERVENTIONS:  - Monitor labs and assess patient for signs and symptoms of electrolyte imbalances  - Administer electrolyte replacement as ordered  - Monitor response to electrolyte replacements, including repeat lab results as appropriate  - Instruct patient on fluid and nutrition as appropriate  Outcome: Progressing  Goal: Fluid balance maintained  Description: INTERVENTIONS:  - Monitor labs   - Monitor I/O and WT  - Instruct patient on fluid and nutrition as appropriate  - Assess for signs & symptoms of volume excess or deficit  Outcome: Progressing  Goal: Glucose maintained within target range  Description: INTERVENTIONS:  - Monitor Blood Glucose as ordered  - Assess for signs and symptoms of hyperglycemia and hypoglycemia  - Administer ordered medications to maintain glucose within target range  - Assess nutritional intake and initiate nutrition service referral as needed  Outcome: Progressing     Problem: SKIN/TISSUE INTEGRITY - ADULT  Goal: Skin Integrity remains intact(Skin Breakdown Prevention)  Description: Assess:  -Perform Vasiliy assessment every shift      -Clean and moisturize skin every shift  -Inspect skin when repositioning, toileting, and assisting with ADLS  -Assess extremities for adequate circulation and sensation     Bed Management:  -Have minimal linens on bed & keep smooth, unwrinkled  -Change linens as needed when moist or perspiring  -Avoid sitting or lying in one position for more than 2 hours while in bed  -Keep HOB at 30 degrees     Toileting:  -Offer bedside commode  -Assess for incontinence every 2 hours  -Use incontinent care products after each incontinent episode    Activity:  -Mobilize patient 3 times a day  -Encourage activity and walks on unit  -Encourage or provide ROM exercises   -Use appropriate equipment to lift or move patient in bed  -Instruct/ Assist with weight shifting every 30 min when out of bed in chair  -Consider limitation of chair time 2 hour intervals    Skin Care:  -Avoid use of baby powder, tape, friction and shearing, hot water or constrictive clothing  -Relieve pressure over bony prominences  -Do not massage red bony areas    Next Steps:  -Teach patient strategies to minimize risks  -Consider consults to  interdisciplinary teams  Outcome: Progressing     Problem: HEMATOLOGIC - ADULT  Goal: Maintains hematologic stability  Description: INTERVENTIONS  - Assess for signs and symptoms of bleeding or hemorrhage  - Monitor labs  - Administer supportive blood products/factors as ordered and appropriate  Outcome: Progressing     Problem: Nutrition/Hydration-ADULT  Goal: Nutrient/Hydration intake appropriate for improving, restoring or maintaining nutritional needs  Description: Monitor and assess patient's nutrition/hydration status for malnutrition. Collaborate with interdisciplinary team and initiate plan and interventions as ordered. Monitor patient's weight and dietary intake as ordered or per policy. Utilize nutrition screening tool and intervene as necessary. Determine patient's food preferences and provide high-protein, high-caloric foods as appropriate.      INTERVENTIONS:  - Monitor oral intake, urinary output, labs, and treatment plans  - Assess nutrition and hydration status and recommend course of action  - Evaluate amount of meals eaten  - Assist patient with eating if necessary   - Allow adequate time for meals  - Recommend/ encourage appropriate diets, oral nutritional supplements, and vitamin/mineral supplements  - Order, calculate, and assess calorie counts as needed  - Recommend, monitor, and adjust tube feedings and TPN/PPN based on assessed needs  - Assess need for intravenous fluids  - Provide specific nutrition/hydration education as appropriate  - Include patient/family/caregiver in decisions related to nutrition  Outcome: Progressing

## 2023-08-19 VITALS
HEART RATE: 94 BPM | WEIGHT: 191 LBS | BODY MASS INDEX: 28.95 KG/M2 | OXYGEN SATURATION: 97 % | SYSTOLIC BLOOD PRESSURE: 118 MMHG | RESPIRATION RATE: 20 BRPM | TEMPERATURE: 97.6 F | HEIGHT: 68 IN | DIASTOLIC BLOOD PRESSURE: 65 MMHG

## 2023-08-19 LAB
ANION GAP SERPL CALCULATED.3IONS-SCNC: 8 MMOL/L
BACTERIA SPT RESP CULT: ABNORMAL
BUN SERPL-MCNC: 32 MG/DL (ref 5–25)
CALCIUM SERPL-MCNC: 8.4 MG/DL (ref 8.4–10.2)
CHLORIDE SERPL-SCNC: 101 MMOL/L (ref 96–108)
CO2 SERPL-SCNC: 29 MMOL/L (ref 21–32)
CREAT SERPL-MCNC: 1.18 MG/DL (ref 0.6–1.3)
ERYTHROCYTE [DISTWIDTH] IN BLOOD BY AUTOMATED COUNT: 14 % (ref 11.6–15.1)
GFR SERPL CREATININE-BSD FRML MDRD: 58 ML/MIN/1.73SQ M
GLUCOSE SERPL-MCNC: 164 MG/DL (ref 65–140)
GRAM STN SPEC: ABNORMAL
HCT VFR BLD AUTO: 43.2 % (ref 36.5–49.3)
HGB BLD-MCNC: 13.8 G/DL (ref 12–17)
MCH RBC QN AUTO: 31.7 PG (ref 26.8–34.3)
MCHC RBC AUTO-ENTMCNC: 31.9 G/DL (ref 31.4–37.4)
MCV RBC AUTO: 99 FL (ref 82–98)
PLATELET # BLD AUTO: 204 THOUSANDS/UL (ref 149–390)
PMV BLD AUTO: 11.1 FL (ref 8.9–12.7)
POTASSIUM SERPL-SCNC: 4.2 MMOL/L (ref 3.5–5.3)
RBC # BLD AUTO: 4.36 MILLION/UL (ref 3.88–5.62)
SODIUM SERPL-SCNC: 138 MMOL/L (ref 135–147)
WBC # BLD AUTO: 20.34 THOUSAND/UL (ref 4.31–10.16)

## 2023-08-19 PROCEDURE — 80048 BASIC METABOLIC PNL TOTAL CA: CPT | Performed by: INTERNAL MEDICINE

## 2023-08-19 PROCEDURE — 94760 N-INVAS EAR/PLS OXIMETRY 1: CPT

## 2023-08-19 PROCEDURE — 94664 DEMO&/EVAL PT USE INHALER: CPT

## 2023-08-19 PROCEDURE — 99239 HOSP IP/OBS DSCHRG MGMT >30: CPT | Performed by: INTERNAL MEDICINE

## 2023-08-19 PROCEDURE — 94640 AIRWAY INHALATION TREATMENT: CPT

## 2023-08-19 PROCEDURE — 85027 COMPLETE CBC AUTOMATED: CPT | Performed by: INTERNAL MEDICINE

## 2023-08-19 RX ORDER — PREDNISONE 10 MG/1
TABLET ORAL
Qty: 30 TABLET | Refills: 0 | Status: SHIPPED | OUTPATIENT
Start: 2023-08-19 | End: 2023-08-31

## 2023-08-19 RX ADMIN — METOPROLOL SUCCINATE 12.5 MG: 25 TABLET, EXTENDED RELEASE ORAL at 09:09

## 2023-08-19 RX ADMIN — POTASSIUM CHLORIDE 20 MEQ: 1500 TABLET, EXTENDED RELEASE ORAL at 09:09

## 2023-08-19 RX ADMIN — IPRATROPIUM BROMIDE 0.5 MG: 0.5 SOLUTION RESPIRATORY (INHALATION) at 07:12

## 2023-08-19 RX ADMIN — BUDESONIDE 0.5 MG: 0.5 INHALANT ORAL at 07:12

## 2023-08-19 RX ADMIN — LEVALBUTEROL HYDROCHLORIDE 1.25 MG: 1.25 SOLUTION RESPIRATORY (INHALATION) at 07:12

## 2023-08-19 RX ADMIN — HEPARIN SODIUM 5000 UNITS: 5000 INJECTION INTRAVENOUS; SUBCUTANEOUS at 05:26

## 2023-08-19 RX ADMIN — PANTOPRAZOLE SODIUM 40 MG: 40 TABLET, DELAYED RELEASE ORAL at 05:26

## 2023-08-19 RX ADMIN — METHYLPREDNISOLONE SODIUM SUCCINATE 40 MG: 40 INJECTION, POWDER, FOR SOLUTION INTRAMUSCULAR; INTRAVENOUS at 05:26

## 2023-08-19 NOTE — DISCHARGE SUMMARY
[address]  Discharge- [facility] 1945, 77 y.o. male MRN: [id number]  Unit/Bed#: -01 Encounter: [id number]  Primary Care Provider: Kristopher Puga DO   Date and time admitted to hospital: 8/16/2023  4:40 PM    * Sepsis due to pneumonia [facility])  Assessment & Plan  · Patient with suspected aspiration pneumonia  · Initial procalcitonin normal, repeat normal as well  · Strep pneumo/urine Legionella negative  · Reviewed case with pulmonology  · Antibiotics have been discontinued per pulmonology recommendation  · Sputum culture with mixed respiratory robi  · Stable off antibiotics  · Follow-up with PCP as outpatient    Chronic obstructive pulmonary disease with acute exacerbation ([address])  Assessment & Plan  · Patient was on IV Solu-Medrol while in the hospital  · Clinically improved today  · Patient requiring 3 L nasal cannula which is his home oxygen requirement  · Continue nightly CPAP  · Continue home nebulizers  · Pulmonology input appreciated  · Will discharge on prednisone taper  · Follow-up with pulmonology as outpatient    Acute and chronic respiratory failure with hypoxia ([address])  Assessment & Plan  · Secondary to COPD exacerbation and suspected pneumonia  · Possible component of fluid overload given bilateral effusions  · Pulmonology input appreciated  · Patient received IV Lasix while in the hospital, diuresed appropriately  · Currently back to home oxygen requirement  · Discharge home with outpatient pulmonology follow-up    Chronic kidney disease, stage 3a [facility])  Assessment & Plan  Lab Results   Component Value Date    EGFR 58 08/19/2023    EGFR 61 08/18/2023    EGFR 51 08/17/2023    CREATININE 1.18 08/19/2023    CREATININE 1.13 08/18/2023    CREATININE 1.30 08/17/2023     Creatinine appears to be at baseline.   Follow-up routine labs with PCP as outpatient    LAMBERTO on CPAP  Assessment & Plan  Continue prehospital CPAP nightly    Chronic diastolic (congestive) heart failure [facility])  Assessment & Plan  Wt Readings from Last 3 Encounters:   08/18/23 86.6 kg (191 lb)   08/02/23 86.6 kg (191 lb)   07/27/23 86.6 kg (191 lb)     · Placed on no added salt diet  · Continue prehospital Toprol-XL 12.5 mg p.o. daily  · Continue Lasix as needed    Hypertension  Assessment & Plan  Continue prehospital Toprol-XL 12.5 mg p.o. daily  Lasix as needed    Hyperlipidemia  Assessment & Plan  Continue home statin      Discharging Physician / Practitioner: Jeanne Hendrickson MD  PCP: Melonie Mills DO  Admission Date:   Admission Orders (From admission, onward)     Ordered        08/16/23 2002  INPATIENT ADMISSION  Once                      Discharge Date: 08/19/23    Medical Problems     Resolved Problems  Date Reviewed: 8/18/2023   None         Consultations During Hospital Stay:  · Pulmonology    Procedures Performed:   · None    Significant Findings / Test Results:   · Respiratory failure, COPD exacerbation    Incidental Findings:   New right hilar and subcarinal lymphadenopathy, likely reactive, though short interval follow-up CT in 3 months is recommended to ensure resolution    Test Results Pending at Discharge (will require follow up): · None     Outpatient Tests Requested:  · Routine labs with PCP as outpatient    Complications:    • None    Reason for Admission: Respiratory failure, suspected pneumonia    Hospital Course:     Ruddy Johnson is a 77 y.o. male patient who originally presented to the hospital on 8/16/2023 due to shortness of breath. Patient initially admitted with suspected pneumonia. Patient was started on IV antibiotics. Pulmonology was consulted. CT did show bilateral effusions. Infectious work-up essentially negative and antibiotics were discontinued per pulmonology recommendation. Patient was given IV diuresis and did clinically improved. Patient was also maintained on IV steroids and nebulizers.   Patient currently requiring 3 L nasal cannula which patient is on continuously at home. Patient is stable for discharge home with outpatient follow-up. Will discharge on prednisone taper. Advised to follow-up with pulmonology as outpatient. Patient did note to have lymphadenopathy on CT chest, advised to follow-up with pulmonology for repeat imaging in 3 months. Patient was able to ambulate today without any acute distress. Please see above list of diagnoses and related plan for additional information. Condition at Discharge: stable     Discharge Day Visit / Exam:     Subjective: No complaints at this time. Patient states he is feeling much better than when he came in. No fever. Tolerating diet. Vitals: Blood Pressure: 118/65 (08/19/23 0704)  Pulse: 94 (08/19/23 0704)  Temperature: 97.6 °F (36.4 °C) (08/19/23 0704)  Temp Source: Temporal (08/17/23 1434)  Respirations: 20 (08/19/23 0704)  Height: 5' 8" (172.7 cm) (08/18/23 0750)  Weight - Scale: 86.6 kg (191 lb) (08/18/23 0750)  SpO2: 97 % (08/19/23 0900)  Exam:   Physical Exam  Constitutional:       General: He is not in acute distress. HENT:      Head: Normocephalic and atraumatic. Nose: Nose normal.      Mouth/Throat:      Mouth: Mucous membranes are moist.   Eyes:      Extraocular Movements: Extraocular movements intact. Conjunctiva/sclera: Conjunctivae normal.   Cardiovascular:      Rate and Rhythm: Normal rate and regular rhythm. Pulmonary:      Effort: Pulmonary effort is normal. No respiratory distress. Abdominal:      Palpations: Abdomen is soft. Tenderness: There is no abdominal tenderness. Musculoskeletal:         General: Normal range of motion. Cervical back: Normal range of motion and neck supple. Skin:     General: Skin is warm and dry. Neurological:      General: No focal deficit present. Mental Status: He is alert. Mental status is at baseline. Cranial Nerves: No cranial nerve deficit.    Psychiatric:         Mood and Affect: Mood normal. Behavior: Behavior normal.         Discussion with Family: Attempted to call patient's family at numbers provided in chart with no answer    Discharge instructions/Information to patient and family:   See after visit summary for information provided to patient and family. Provisions for Follow-Up Care:  See after visit summary for information related to follow-up care and any pertinent home health orders. Disposition:     Home    Planned Readmission:   • no     Discharge Statement:  I spent 35 minutes discharging the patient. This time was spent on the day of discharge. I had direct contact with the patient on the day of discharge. Greater than 50% of the total time was spent examining patient, answering all patient questions, arranging and discussing plan of care with patient as well as directly providing post-discharge instructions. Additional time then spent on discharge activities. Discharge Medications:  See after visit summary for reconciled discharge medications provided to patient and family.       ** Please Note: This note has been constructed using a voice recognition system **

## 2023-08-19 NOTE — RESPIRATORY THERAPY NOTE
08/18/23 1081   Respiratory Assessment   Resp Comments pt placed on own preset cpap machine with 3L for HS.    O2 Device cpap   Non-Invasive Information   O2 Interface Device Full face mask   Non-Invasive Ventilation Mode CPAP   $ Pulse Oximetry Spot Check Charge Completed   Non-Invasive Settings   FiO2 (%)   (3L)   PEEP/CPAP (cm H2O)   (preset)

## 2023-08-19 NOTE — ASSESSMENT & PLAN NOTE
· Patient was on IV Solu-Medrol while in the hospital  · Clinically improved today  · Patient requiring 3 L nasal cannula which is his home oxygen requirement  · Continue nightly CPAP  · Continue home nebulizers  · Pulmonology input appreciated  · Will discharge on prednisone taper  · Follow-up with pulmonology as outpatient

## 2023-08-19 NOTE — ASSESSMENT & PLAN NOTE
· Patient with suspected aspiration pneumonia  · Initial procalcitonin normal, repeat normal as well  · Strep pneumo/urine Legionella negative  · Reviewed case with pulmonology  · Antibiotics have been discontinued per pulmonology recommendation  · Sputum culture with mixed respiratory robi  · Stable off antibiotics  · Follow-up with PCP as outpatient

## 2023-08-19 NOTE — CASE MANAGEMENT
Case Management Discharge Planning Note    Patient name Yelena Patiño  Location /-13 MRN [id number]  : 1945 Date 2023       Current Admission Date: 2023  Current Admission Diagnosis:Sepsis due to pneumonia [facility])   Patient Active Problem List    Diagnosis Date Noted   • Erectile dysfunction 2023   • Other disorders of refraction 2023   • Occlusion and stenosis of unspecified carotid artery 2023   • Dysphagia 2023   • Congestive heart failure with left ventricular systolic dysfunction (LVSD) ([address]) 2023   • Hypoxemia 2023   • Intracranial aneurysm 2023   • Allergic rhinitis, unspecified 2023   • Chronic kidney disease, stage 3a ([address]) 2023   • Generalized muscle weakness 2023   • Hypertensive heart and chronic kidney disease with heart failure and stage 1 through stage 4 chronic kidney disease, or unspecified chronic kidney disease ([address]) 2023   • Need for assistance with personal care 2023   • Other abnormalities of gait and mobility 2023   • Retention of urine, unspecified 2023   • Sudden idiopathic hearing loss, right ear 2023   • Acute and chronic respiratory failure with hypoxia ([address]) 2023   • Elevated troponin 2023   • Sepsis due to pneumonia ([address]) 2023   • Orthopnea 2023   • Lower extremity edema 2023   • Irregular heart rhythm 2023   • Leukocytosis 2023   • Left leg pain 2023   • Transient right leg weakness 2023   • Hypokalemia 2023   • Ambulatory dysfunction 2023   • COPD (chronic obstructive pulmonary disease) ([address]) 2023   • Sensorineural hearing loss, bilateral 10/19/2022   • Chronic respiratory failure with hypoxia ([address]) 10/15/2022   • Prostate cancer ([address]) 2022   • Elevated MCV 2022   • Cubital tunnel syndrome on left 2022   • Carpal tunnel syndrome on left 2022   • 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   • Urinary frequency 04/27/2022   • Incomplete bladder emptying 04/27/2022   • Chronic bilateral low back pain with right-sided sciatica 04/18/2022   • Mid back pain 04/18/2022   • Thoracic radiculopathy 04/18/2022   • Chronic pain syndrome 04/18/2022   • Hoarseness or changing voice 04/14/2022   • Chronic right-sided thoracic back pain 03/05/2022   • Primary insomnia 03/02/2022   • Right hip pain 01/20/2022   • Abnormal nuclear stress test 03/18/2021   • Costochondral chest pain 01/15/2021   • Chronic obstructive pulmonary disease with acute exacerbation ([address]) 01/11/2021   • Oxygen dependent 01/11/2021   • S/P carotid endarterectomy 01/11/2021   • Stented coronary artery 01/11/2021   • Vertigo 01/11/2021   • Anisometropia 01/11/2021   • Osteoarthritis of spinal facet joint 01/11/2021   • Chronic ischemic heart disease 01/11/2021   • Chronic diastolic (congestive) heart failure ([address]) 01/11/2021   • Diverticular disease of colon 01/11/2021   • Dry eye syndrome 01/11/2021   • GERD (gastroesophageal reflux disease) 01/11/2021   • Acute hearing loss, right 01/11/2021   • Elevated PSA 01/11/2021   • Hypoxia 01/11/2021   • Lens replaced by other means 01/11/2021   • Lumbar radiculopathy 01/11/2021   • Multinodular goiter 01/11/2021   • Nuclear senile cataract 01/11/2021   • Obesity 01/11/2021   • Occlusion and stenosis of carotid artery 01/11/2021   • Osteoarthritis of hip 01/11/2021   • Prediabetes 01/11/2021   • LAMBERTO on CPAP 01/11/2021   • Solitary pulmonary nodule 01/11/2021   • Thyroid nodule 01/11/2021   • Guyon syndrome, 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   • Depression, recurrent ([address])    • Hyperlipidemia    • Coronary artery disease involving native coronary artery of native heart without angina pectoris    • Left carotid artery occlusion    • Hypertension       LOS (days): 3  Geometric Mean LOS (GMLOS) (days): 5.00  Days to GMLOS:2.3     OBJECTIVE:  Risk of Unplanned Readmission Score: 54.23         Current admission status: Inpatient   Preferred Pharmacy:   [facility] [address], [address]  [address] [facility].  [address] Joseluis FANG [address]  Phone: [phone] Fax: [phone]    [address] Delivery ([facility]) - [place], [address] [id number] [facility]  Phone: [phone] Fax: [phone]    Primary Care Provider: Alexandria Waggoner DO    Primary Insurance: [facility] [place]  Secondary Insurance:     DISCHARGE DETAILS:    Discharge planning discussed with[de-identified] patient and Kanika Reddy (daughter) was called 13:15 pm  Freedom of Choice: Yes  Comments - Freedom of Choice: pt is active with [facility] as per daughter- referral was sent to [facility] via [facility]- pt will continue with them  CM contacted family/caregiver?: Yes  Were Treatment Team discharge recommendations reviewed with patient/caregiver?: Yes  Did patient/caregiver verbalize understanding of patient care needs?: Yes  Were patient/caregiver advised of the risks associated with not following Treatment Team discharge recommendations?: Yes    Contacts  Patient Contacts: Tang Delvalle  Relationship to Patient[de-identified] Family (daughter)  Contact Method: Phone  Phone Number: [phone]  Reason/Outcome: Discharge [facility]         Is the patient interested in [address] [facility] at discharge?: Yes  [facility] requested[de-identified] Nursing, Occupational Therapy, Physical Therapy  HHA External Referral Reason (only applicable if external HHA name selected): Patient has established relationship with provider  [facility] Provider[de-identified] PCP  Home Health Services Needed[de-identified] Strengthening/Theraputic Exercises to Improve Function, Heart Failure Management, Oxygen Via Nasal Cannula, COPD Management, Other (comment) (assess for s/s of infection)  Oxygen LPM Ordered (if applicable based on home health services needed):: 3 LPM  Homebound Criteria Met[de-identified] Requires the Assistance of Another Person for Safe Ambulation or to Leave the Home, Uses an Assist Device (i.e. cane, walker, etc), Communicable Disease  Supporting Clincal Findings[de-identified] Requires Oxygen, Limited Endurance    DME Referral Provided  Referral made for DME?: No    Other Referral/Resources/Interventions Provided:  Interventions: [facility]  Referral Comments: pt is active with [facility]- referral was sent  avs will be fqaxed    Would you like to participate in our [address] service program?  : No - Declined    Treatment Team Recommendation: Home with [address] (home with family & [facility] & outpt follow up- family)  Discharge Destination Plan[de-identified] Home with [address] (home with family & [facility] & outpt followup)          IMM reviewed with patient, patient agrees with discharge determination.      pt and family are in agreement with the d/c & d/c plan- family will bring his portable concentrator                    IMM Given (Date):: 08/19/23  IMM Given to[de-identified] Patient  Family notified[de-identified] Elijah Anthony was called

## 2023-08-19 NOTE — PLAN OF CARE
Problem: Potential for Falls  Goal: Patient will remain free of falls  Description: INTERVENTIONS:  - Educate patient/family on patient safety including physical limitations  - Instruct patient to call for assistance with activity   - Consult OT/PT to assist with strengthening/mobility   - Keep Call bell within reach  - Keep bed low and locked with side rails adjusted as appropriate  - Keep care items and personal belongings within reach  - Initiate and maintain comfort rounds  - Make Fall Risk Sign visible to staff  - Offer Toileting every 2 Hours, in advance of need  - Initiate/Maintain bed alarm  - Obtain necessary fall risk management equipment  - Apply yellow socks and bracelet for high fall risk patients  - Consider moving patient to room near nurses station  Outcome: Progressing     Problem: MOBILITY - ADULT  Goal: Maintain or return to baseline ADL function  Description: INTERVENTIONS:  - Educate patient/family on patient safety including physical limitations  - Instruct patient to call for assistance with activity   - Consult OT/PT to assist with strengthening/mobility   - Keep Call bell within reach  - Keep bed low and locked with side rails adjusted as appropriate  - Keep care items and personal belongings within reach  - Initiate and maintain comfort rounds  - Make Fall Risk Sign visible to staff  - Apply yellow socks and bracelet for high fall risk patients  - Consider moving patient to room near nurses station  Outcome: Progressing  Goal: Maintains/Returns to pre admission functional level  Description: INTERVENTIONS:  - Perform BMAT or MOVE assessment daily.   - Set and communicate daily mobility goal to care team and patient/family/caregiver. - Collaborate with rehabilitation services on mobility goals if consulted  - Perform Range of Motion 3 times a day. - Reposition patient every 2 hours.   - Dangle patient 3 times a day  - Stand patient 3 times a day  - Ambulate patient 3 times a day  - Out of bed to chair 3 times a day   - Out of bed for meals 3 times a day  - Out of bed for toileting  - Record patient progress and toleration of activity level   Outcome: Progressing     Problem: PAIN - ADULT  Goal: Verbalizes/displays adequate comfort level or baseline comfort level  Description: Interventions:  - Encourage patient to monitor pain and request assistance  - Assess pain using appropriate pain scale  - Administer analgesics based on type and severity of pain and evaluate response  - Implement non-pharmacological measures as appropriate and evaluate response  - Consider cultural and social influences on pain and pain management  - Notify physician/advanced practitioner if interventions unsuccessful or patient reports new pain  Outcome: Progressing     Problem: INFECTION - ADULT  Goal: Absence or prevention of progression during hospitalization  Description: INTERVENTIONS:  - Assess and monitor for signs and symptoms of infection  - Monitor lab/diagnostic results  - Monitor all insertion sites, i.e. indwelling lines, tubes, and drains  - Monitor endotracheal if appropriate and nasal secretions for changes in amount and color  - [place] appropriate cooling/warming therapies per order  - Administer medications as ordered  - Instruct and encourage patient and family to use good hand hygiene technique  - Identify and instruct in appropriate isolation precautions for identified infection/condition  Outcome: Progressing     Problem: SAFETY ADULT  Goal: Patient will remain free of falls  Description: INTERVENTIONS:  - Educate patient/family on patient safety including physical limitations  - Instruct patient to call for assistance with activity   - Consult OT/PT to assist with strengthening/mobility   - Keep Call bell within reach  - Keep bed low and locked with side rails adjusted as appropriate  - Keep care items and personal belongings within reach  - Initiate and maintain comfort rounds  - Make Fall Risk Sign visible to staff  - Offer Toileting every 2 Hours, in advance of need  - Initiate/Maintain bed alarm  - Obtain necessary fall risk management equipment  - Apply yellow socks and bracelet for high fall risk patients  - Consider moving patient to room near nurses station  Outcome: Progressing  Goal: Maintain or return to baseline ADL function  Description: INTERVENTIONS:  - Educate patient/family on patient safety including physical limitations  - Instruct patient to call for assistance with activity   - Consult OT/PT to assist with strengthening/mobility   - Keep Call bell within reach  - Keep bed low and locked with side rails adjusted as appropriate  - Keep care items and personal belongings within reach  - Initiate and maintain comfort rounds  - Make Fall Risk Sign visible to staff  - Apply yellow socks and bracelet for high fall risk patients  - Consider moving patient to room near nurses station  Outcome: Progressing  Goal: Maintains/Returns to pre admission functional level  Description: INTERVENTIONS:  - Perform BMAT or MOVE assessment daily.   - Set and communicate daily mobility goal to care team and patient/family/caregiver. - Collaborate with rehabilitation services on mobility goals if consulted  - Perform Range of Motion 3 times a day. - Reposition patient every 2 hours.   - Dangle patient 3 times a day  - Stand patient 3 times a day  - Ambulate patient 3 times a day  - Out of bed to chair 3 times a day   - Out of bed for meals 3 times a day  - Out of bed for toileting  - Record patient progress and toleration of activity level   Outcome: Progressing     Problem: DISCHARGE PLANNING  Goal: Discharge to home or other facility with appropriate resources  Description: INTERVENTIONS:  - Identify barriers to discharge w/patient and caregiver  - Arrange for needed discharge resources and transportation as appropriate  - Identify discharge learning needs (meds, wound care, etc.)  - Arrange for interpretive services to assist at discharge as needed  - Refer to Case Management Department for coordinating discharge planning if the patient needs post-hospital services based on physician/advanced practitioner order or complex needs related to functional status, cognitive ability, or social support system  Outcome: Progressing     Problem: Knowledge Deficit  Goal: Patient/family/caregiver demonstrates understanding of disease process, treatment plan, medications, and discharge instructions  Description: Complete learning assessment and assess knowledge base.   Interventions:  - Provide teaching at level of understanding  - Provide teaching via preferred learning methods  Outcome: Progressing     Problem: CARDIOVASCULAR - ADULT  Goal: Maintains optimal cardiac output and hemodynamic stability  Description: INTERVENTIONS:  - Monitor I/O, vital signs and rhythm  - Monitor for S/S and trends of decreased cardiac output  - Administer and titrate ordered vasoactive medications to optimize hemodynamic stability  - Assess quality of pulses, skin color and temperature  - Assess for signs of decreased coronary artery perfusion  - Instruct patient to report change in severity of symptoms  Outcome: Progressing  Goal: Absence of cardiac dysrhythmias or at baseline rhythm  Description: INTERVENTIONS:  - Continuous cardiac monitoring, vital signs, obtain 12 lead EKG if ordered  - Administer antiarrhythmic and heart rate control medications as ordered  - Monitor electrolytes and administer replacement therapy as ordered  Outcome: Progressing     Problem: RESPIRATORY - ADULT  Goal: Achieves optimal ventilation and oxygenation  Description: INTERVENTIONS:  - Assess for changes in respiratory status  - Assess for changes in mentation and behavior  - Position to facilitate oxygenation and minimize respiratory effort  - Oxygen administered by appropriate delivery if ordered  - Initiate smoking cessation education as indicated  - Encourage broncho-pulmonary hygiene including cough, deep breathe, Incentive Spirometry  - Assess the need for suctioning and aspirate as needed  - Assess and instruct to report SOB or any respiratory difficulty  - Respiratory Therapy support as indicated  Outcome: Progressing     Problem: GASTROINTESTINAL - ADULT  Goal: Maintains adequate nutritional intake  Description: INTERVENTIONS:  - Monitor percentage of each meal consumed  - Identify factors contributing to decreased intake, treat as appropriate  - Assist with meals as needed  - Monitor I&O, weight, and lab values if indicated  - Obtain nutrition services referral as needed  Outcome: Progressing     Problem: GENITOURINARY - ADULT  Goal: Absence of urinary retention  Description: INTERVENTIONS:  - Assess patient's ability to void and empty bladder  - Monitor I/O  - Bladder scan as needed  - Discuss with physician/AP medications to alleviate retention as needed  - Discuss catheterization for long term situations as appropriate  Outcome: Progressing     Problem: METABOLIC, FLUID AND ELECTROLYTES - ADULT  Goal: Electrolytes maintained within normal limits  Description: INTERVENTIONS:  - Monitor labs and assess patient for signs and symptoms of electrolyte imbalances  - Administer electrolyte replacement as ordered  - Monitor response to electrolyte replacements, including repeat lab results as appropriate  - Instruct patient on fluid and nutrition as appropriate  Outcome: Progressing  Goal: Fluid balance maintained  Description: INTERVENTIONS:  - Monitor labs   - Monitor I/O and WT  - Instruct patient on fluid and nutrition as appropriate  - Assess for signs & symptoms of volume excess or deficit  Outcome: Progressing  Goal: Glucose maintained within target range  Description: INTERVENTIONS:  - Monitor Blood Glucose as ordered  - Assess for signs and symptoms of hyperglycemia and hypoglycemia  - Administer ordered medications to maintain glucose within target range  - Assess nutritional intake and initiate nutrition service referral as needed  Outcome: Progressing     Problem: HEMATOLOGIC - ADULT  Goal: Maintains hematologic stability  Description: INTERVENTIONS  - Assess for signs and symptoms of bleeding or hemorrhage  - Monitor labs  - Administer supportive blood products/factors as ordered and appropriate  Outcome: Progressing     Problem: Nutrition/Hydration-ADULT  Goal: Nutrient/Hydration intake appropriate for improving, restoring or maintaining nutritional needs  Description: Monitor and assess patient's nutrition/hydration status for malnutrition. Collaborate with interdisciplinary team and initiate plan and interventions as ordered. Monitor patient's weight and dietary intake as ordered or per policy. Utilize nutrition screening tool and intervene as necessary. Determine patient's food preferences and provide high-protein, high-caloric foods as appropriate.      INTERVENTIONS:  - Monitor oral intake, urinary output, labs, and treatment plans  - Assess nutrition and hydration status and recommend course of action  - Evaluate amount of meals eaten  - Assist patient with eating if necessary   - Allow adequate time for meals  - Recommend/ encourage appropriate diets, oral nutritional supplements, and vitamin/mineral supplements  - Order, calculate, and assess calorie counts as needed  - Recommend, monitor, and adjust tube feedings and TPN/PPN based on assessed needs  - Assess need for intravenous fluids  - Provide specific nutrition/hydration education as appropriate  - Include patient/family/caregiver in decisions related to nutrition  Outcome: Progressing

## 2023-08-19 NOTE — PLAN OF CARE
Problem: Potential for Falls  Goal: Patient will remain free of falls  Description: INTERVENTIONS:  - Educate patient/family on patient safety including physical limitations  - Instruct patient to call for assistance with activity   - Consult OT/PT to assist with strengthening/mobility   - Keep Call bell within reach  - Keep bed low and locked with side rails adjusted as appropriate  - Keep care items and personal belongings within reach  - Initiate and maintain comfort rounds  - Make Fall Risk Sign visible to staff  - Offer Toileting every 2 Hours, in advance of need  - Initiate/Maintain bed alarm  - Obtain necessary fall risk management equipment  - Apply yellow socks and bracelet for high fall risk patients  - Consider moving patient to room near nurses station  8/19/2023 1216 by Lisa Parrish RN  Outcome: Adequate for Discharge  8/19/2023 1040 by Lisa Parrish RN  Outcome: Progressing     Problem: MOBILITY - ADULT  Goal: Maintain or return to baseline ADL function  Description: INTERVENTIONS:  - Educate patient/family on patient safety including physical limitations  - Instruct patient to call for assistance with activity   - Consult OT/PT to assist with strengthening/mobility   - Keep Call bell within reach  - Keep bed low and locked with side rails adjusted as appropriate  - Keep care items and personal belongings within reach  - Initiate and maintain comfort rounds  - Make Fall Risk Sign visible to staff  - Apply yellow socks and bracelet for high fall risk patients  - Consider moving patient to room near nurses station  8/19/2023 1216 by Lisa Parrish RN  Outcome: Adequate for Discharge  8/19/2023 1040 by Lisa Parrish RN  Outcome: Progressing  Goal: Maintains/Returns to pre admission functional level  Description: INTERVENTIONS:  - Perform BMAT or MOVE assessment daily.   - Set and communicate daily mobility goal to care team and patient/family/caregiver.    - Collaborate with rehabilitation services on mobility goals if consulted  - Perform Range of Motion 3 times a day. - Reposition patient every 2 hours.   - Dangle patient 32 times a day  - Stand patient 3 times a day  - Ambulate patient 3 times a day  - Out of bed to chair 3 times a day   - Out of bed for meals 3 times a day  - Out of bed for toileting  - Record patient progress and toleration of activity level   8/19/2023 1216 by Lottie Mcfarland RN  Outcome: Adequate for Discharge  8/19/2023 1040 by Lottie Mcfarland RN  Outcome: Progressing     Problem: PAIN - ADULT  Goal: Verbalizes/displays adequate comfort level or baseline comfort level  Description: Interventions:  - Encourage patient to monitor pain and request assistance  - Assess pain using appropriate pain scale  - Administer analgesics based on type and severity of pain and evaluate response  - Implement non-pharmacological measures as appropriate and evaluate response  - Consider cultural and social influences on pain and pain management  - Notify physician/advanced practitioner if interventions unsuccessful or patient reports new pain  8/19/2023 1216 by Lottie Mcfarland RN  Outcome: Adequate for Discharge  8/19/2023 1040 by Lottie Mcfarland RN  Outcome: Progressing     Problem: INFECTION - ADULT  Goal: Absence or prevention of progression during hospitalization  Description: INTERVENTIONS:  - Assess and monitor for signs and symptoms of infection  - Monitor lab/diagnostic results  - Monitor all insertion sites, i.e. indwelling lines, tubes, and drains  - Monitor endotracheal if appropriate and nasal secretions for changes in amount and color  - [place] appropriate cooling/warming therapies per order  - Administer medications as ordered  - Instruct and encourage patient and family to use good hand hygiene technique  - Identify and instruct in appropriate isolation precautions for identified infection/condition  8/19/2023 1216 by Lottie Mcfarland RN  Outcome: Adequate for Discharge  8/19/2023 1040 by Mili Nunez LEYDA Peña  Outcome: Progressing     Problem: SAFETY ADULT  Goal: Patient will remain free of falls  Description: INTERVENTIONS:  - Educate patient/family on patient safety including physical limitations  - Instruct patient to call for assistance with activity   - Consult OT/PT to assist with strengthening/mobility   - Keep Call bell within reach  - Keep bed low and locked with side rails adjusted as appropriate  - Keep care items and personal belongings within reach  - Initiate and maintain comfort rounds  - Make Fall Risk Sign visible to staff  - Offer Toileting every 2 Hours, in advance of need  - Initiate/Maintain bed alarm  - Obtain necessary fall risk management equipment  - Apply yellow socks and bracelet for high fall risk patients  - Consider moving patient to room near nurses station  8/19/2023 1216 by Wicho Rea RN  Outcome: Adequate for Discharge  8/19/2023 1040 by Wicho Rea RN  Outcome: Progressing  Goal: Maintain or return to baseline ADL function  Description: INTERVENTIONS:  - Educate patient/family on patient safety including physical limitations  - Instruct patient to call for assistance with activity   - Consult OT/PT to assist with strengthening/mobility   - Keep Call bell within reach  - Keep bed low and locked with side rails adjusted as appropriate  - Keep care items and personal belongings within reach  - Initiate and maintain comfort rounds  - Make Fall Risk Sign visible to staff  - Apply yellow socks and bracelet for high fall risk patients  - Consider moving patient to room near nurses station  8/19/2023 1216 by Wicho Rea RN  Outcome: Adequate for Discharge  8/19/2023 1040 by Wicho Rea RN  Outcome: Progressing  Goal: Maintains/Returns to pre admission functional level  Description: INTERVENTIONS:  - Perform BMAT or MOVE assessment daily.   - Set and communicate daily mobility goal to care team and patient/family/caregiver.    - Collaborate with rehabilitation services on mobility goals if consulted  - Perform Range of Motion 3 times a day. - Reposition patient every 2 hours. - Dangle patient 3 times a day  - Stand patient 3 times a day  - Ambulate patient 3 times a day  - Out of bed to chair 3 times a day   - Out of bed for meals 3 times a day  - Out of bed for toileting  - Record patient progress and toleration of activity level   8/19/2023 1216 by Fartun Coronel RN  Outcome: Adequate for Discharge  8/19/2023 1040 by Fartun Coronel RN  Outcome: Progressing     Problem: DISCHARGE PLANNING  Goal: Discharge to home or other facility with appropriate resources  Description: INTERVENTIONS:  - Identify barriers to discharge w/patient and caregiver  - Arrange for needed discharge resources and transportation as appropriate  - Identify discharge learning needs (meds, wound care, etc.)  - Arrange for interpretive services to assist at discharge as needed  - Refer to Case Management Department for coordinating discharge planning if the patient needs post-hospital services based on physician/advanced practitioner order or complex needs related to functional status, cognitive ability, or social support system  8/19/2023 1216 by Fartun Coronel RN  Outcome: Adequate for Discharge  8/19/2023 1040 by Fartun Coronel RN  Outcome: Progressing     Problem: Knowledge Deficit  Goal: Patient/family/caregiver demonstrates understanding of disease process, treatment plan, medications, and discharge instructions  Description: Complete learning assessment and assess knowledge base.   Interventions:  - Provide teaching at level of understanding  - Provide teaching via preferred learning methods  8/19/2023 1216 by Fartun Coronel RN  Outcome: Adequate for Discharge  8/19/2023 1040 by Fartun Coronel RN  Outcome: Progressing     Problem: CARDIOVASCULAR - ADULT  Goal: Maintains optimal cardiac output and hemodynamic stability  Description: INTERVENTIONS:  - Monitor I/O, vital signs and rhythm  - Monitor for S/S and trends of decreased cardiac output  - Administer and titrate ordered vasoactive medications to optimize hemodynamic stability  - Assess quality of pulses, skin color and temperature  - Assess for signs of decreased coronary artery perfusion  - Instruct patient to report change in severity of symptoms  8/19/2023 1216 by Jeffery Land RN  Outcome: Adequate for Discharge  8/19/2023 1040 by Jeffery Land RN  Outcome: Progressing  Goal: Absence of cardiac dysrhythmias or at baseline rhythm  Description: INTERVENTIONS:  - Continuous cardiac monitoring, vital signs, obtain 12 lead EKG if ordered  - Administer antiarrhythmic and heart rate control medications as ordered  - Monitor electrolytes and administer replacement therapy as ordered  8/19/2023 1216 by Jeffery Land RN  Outcome: Adequate for Discharge  8/19/2023 1040 by Jeffery Land RN  Outcome: Progressing     Problem: RESPIRATORY - ADULT  Goal: Achieves optimal ventilation and oxygenation  Description: INTERVENTIONS:  - Assess for changes in respiratory status  - Assess for changes in mentation and behavior  - Position to facilitate oxygenation and minimize respiratory effort  - Oxygen administered by appropriate delivery if ordered  - Initiate smoking cessation education as indicated  - Encourage broncho-pulmonary hygiene including cough, deep breathe, Incentive Spirometry  - Assess the need for suctioning and aspirate as needed  - Assess and instruct to report SOB or any respiratory difficulty  - Respiratory Therapy support as indicated  8/19/2023 1216 by Jeffery Land RN  Outcome: Adequate for Discharge  8/19/2023 1040 by Jeffery Land RN  Outcome: Progressing     Problem: GASTROINTESTINAL - ADULT  Goal: Maintains adequate nutritional intake  Description: INTERVENTIONS:  - Monitor percentage of each meal consumed  - Identify factors contributing to decreased intake, treat as appropriate  - Assist with meals as needed  - Monitor I&O, weight, and lab values if indicated  - Obtain nutrition services referral as needed  8/19/2023 1216 by Tim De Leon RN  Outcome: Adequate for Discharge  8/19/2023 1040 by Tim De Leon RN  Outcome: Progressing     Problem: GENITOURINARY - ADULT  Goal: Absence of urinary retention  Description: INTERVENTIONS:  - Assess patient's ability to void and empty bladder  - Monitor I/O  - Bladder scan as needed  - Discuss with physician/AP medications to alleviate retention as needed  - Discuss catheterization for long term situations as appropriate  8/19/2023 1216 by Tim De Leon RN  Outcome: Adequate for Discharge  8/19/2023 1040 by Tim De Leon RN  Outcome: Progressing     Problem: METABOLIC, FLUID AND ELECTROLYTES - ADULT  Goal: Electrolytes maintained within normal limits  Description: INTERVENTIONS:  - Monitor labs and assess patient for signs and symptoms of electrolyte imbalances  - Administer electrolyte replacement as ordered  - Monitor response to electrolyte replacements, including repeat lab results as appropriate  - Instruct patient on fluid and nutrition as appropriate  8/19/2023 1216 by Tim De Leon RN  Outcome: Adequate for Discharge  8/19/2023 1040 by Tim De Leon RN  Outcome: Progressing  Goal: Fluid balance maintained  Description: INTERVENTIONS:  - Monitor labs   - Monitor I/O and WT  - Instruct patient on fluid and nutrition as appropriate  - Assess for signs & symptoms of volume excess or deficit  8/19/2023 1216 by Tim De Leon RN  Outcome: Adequate for Discharge  8/19/2023 1040 by Tim De Leon RN  Outcome: Progressing  Goal: Glucose maintained within target range  Description: INTERVENTIONS:  - Monitor Blood Glucose as ordered  - Assess for signs and symptoms of hyperglycemia and hypoglycemia  - Administer ordered medications to maintain glucose within target range  - Assess nutritional intake and initiate nutrition service referral as needed  8/19/2023 1216 by Tim De Leon RN  Outcome: Adequate for Discharge  8/19/2023 1040 by Shayla Hernandez RN  Outcome: Progressing     Problem: Nutrition/Hydration-ADULT  Goal: Nutrient/Hydration intake appropriate for improving, restoring or maintaining nutritional needs  Description: Monitor and assess patient's nutrition/hydration status for malnutrition. Collaborate with interdisciplinary team and initiate plan and interventions as ordered. Monitor patient's weight and dietary intake as ordered or per policy. Utilize nutrition screening tool and intervene as necessary. Determine patient's food preferences and provide high-protein, high-caloric foods as appropriate.      INTERVENTIONS:  - Monitor oral intake, urinary output, labs, and treatment plans  - Assess nutrition and hydration status and recommend course of action  - Evaluate amount of meals eaten  - Assist patient with eating if necessary   - Allow adequate time for meals  - Recommend/ encourage appropriate diets, oral nutritional supplements, and vitamin/mineral supplements  - Order, calculate, and assess calorie counts as needed  - Recommend, monitor, and adjust tube feedings and TPN/PPN based on assessed needs  - Assess need for intravenous fluids  - Provide specific nutrition/hydration education as appropriate  - Include patient/family/caregiver in decisions related to nutrition  8/19/2023 1216 by Shayla Hernandez RN  Outcome: Adequate for Discharge  8/19/2023 1040 by Shayla Hernandez RN  Outcome: Progressing

## 2023-08-19 NOTE — PROGRESS NOTES
-- Patient:  -- MRN: [id number]  -- Aidin Request ID: [id number]  -- Level of care reserved: [address]  -- Partner Reserved: [address] [place] [address] Layne Cevallos, [address] [facility]. [phone]  -- Clinical needs requested:  -- Geography searched: [address] [place] [address]  -- Start of Service:  -- Request sent: 1:24pm EDT on 8/19/2023 by Wendy Luevano  -- Partner reserved: 3:41pm EDT on 8/19/2023 by Wendy Luevano  -- Choice list shared: 3:40pm EDT on 8/19/2023 by Wendy Luevano

## 2023-08-19 NOTE — ASSESSMENT & PLAN NOTE
Lab Results   Component Value Date    EGFR 58 08/19/2023    EGFR 61 08/18/2023    EGFR 51 08/17/2023    CREATININE 1.18 08/19/2023    CREATININE 1.13 08/18/2023    CREATININE 1.30 08/17/2023     Creatinine appears to be at baseline.   Follow-up routine labs with PCP as outpatient

## 2023-08-19 NOTE — ASSESSMENT & PLAN NOTE
· Secondary to COPD exacerbation and suspected pneumonia  · Possible component of fluid overload given bilateral effusions  · Pulmonology input appreciated  · Patient received IV Lasix while in the hospital, diuresed appropriately  · Currently back to home oxygen requirement  · Discharge home with outpatient pulmonology follow-up

## 2023-08-21 ENCOUNTER — TRANSITIONAL CARE MANAGEMENT (OUTPATIENT)
Dept: FAMILY MEDICINE CLINIC | Facility: CLINIC | Age: 78
End: 2023-08-21

## 2023-08-21 ENCOUNTER — PATIENT OUTREACH (OUTPATIENT)
Dept: CASE MANAGEMENT | Facility: OTHER | Age: 78
End: 2023-08-21

## 2023-08-21 NOTE — PROGRESS NOTES
Outpatient Care Management Note:  In basket referral for the [facility] program received. Chart review completed. Chart review completed for the following sections:  • Recent Vital Signs  • Allergies/Contradictions  • Medication Review   • History   • SDOH   • Problem List  • Immunizations  • Past hospitalizations and major procedures, including surgery  • Significant past illnesses and treatment history including: History and Physical, Other provider notes, PT, OT, ST, Medications/Infusions/Transfusions, Radiation/Chemo, Diet/Fluid restrictions and lab and radiology results.    • Relevant past medications related to the patient's condition

## 2023-08-21 NOTE — UTILIZATION REVIEW
NOTIFICATION OF ADMISSION DISCHARGE   This is a Notification of Discharge from [facility]. Please be advised that this patient has been discharge from our facility. Below you will find the admission and discharge date and time including the patient’s disposition. UTILIZATION REVIEW CONTACT:  Orlando Urbina  Utilization   Network Utilization Review Department  Phone: [phone] carefully listen to the prompts. All voicemails are confidential.  Email: [email or web address]     ADMISSION INFORMATION  PRESENTATION DATE: 8/16/2023  4:40 PM  OBERVATION ADMISSION DATE:   INPATIENT ADMISSION DATE: 8/16/23  8:03 PM   DISCHARGE DATE: 8/19/2023  1:59 PM   DISPOSITION:Home with Home Health Care    IMPORTANT INFORMATION:  Send all requests for admission clinical reviews, approved or denied determinations and any other requests to dedicated fax number below belonging to the campus where the patient is receiving treatment.  List of dedicated fax numbers:  [place] DENIALS (Administrative/Medical Necessity) [phone] [address] [facility] (Maternity/NICU/Pediatrics) [phone]   [facility] [phone]   [facility] [phone] 16[address] [facility] [phone]   [address] [place] [address] [phone]   [facility] [phone]   [address] [place] [address] 608 [facility] [phone]   [address] [place] [address] [phone] 3[address] [facility] [phone] 27[address] [facility] [address] [facility] [phone]

## 2023-08-23 ENCOUNTER — OFFICE VISIT (OUTPATIENT)
Dept: PODIATRY | Facility: CLINIC | Age: 78
End: 2023-08-23
Payer: COMMERCIAL

## 2023-08-23 VITALS
HEART RATE: 92 BPM | SYSTOLIC BLOOD PRESSURE: 111 MMHG | BODY MASS INDEX: 29.04 KG/M2 | DIASTOLIC BLOOD PRESSURE: 56 MMHG | HEIGHT: 68 IN

## 2023-08-23 DIAGNOSIS — B35.1 ONYCHOMYCOSIS: Primary | ICD-10-CM

## 2023-08-23 DIAGNOSIS — I87.2 VENOUS INSUFFICIENCY OF BOTH LOWER EXTREMITIES: ICD-10-CM

## 2023-08-23 PROCEDURE — 11721 DEBRIDE NAIL 6 OR MORE: CPT | Performed by: STUDENT IN AN ORGANIZED HEALTH CARE EDUCATION/TRAINING PROGRAM

## 2023-08-23 NOTE — PROGRESS NOTES
Assessment/Plan:    No problem-specific Assessment & Plan notes found for this encounter. Diagnoses and all orders for this visit:    Onychomycosis    Venous insufficiency of both lower extremities          Plan:     1. A thorough neurovascular exam was performed. Patient presents for at-risk foot care. Patient has no acute concerns today. Patient has significant lower extremity risk due to diminished PT pulses in the feet and trophic skin changes to the lower extremity including thick toenail, atrophic skin, and decreased hair growth.       2. All 10 toenails debrided Using nail nipper, wes, and curette, reduced in thickness and length. Devitalized nail tissue and fungal debris excised and removed. Patient tolerated well.        3. Patient was educated on importance of daily foot assessment and proper shoe gear. Educational materials were provided. Patient has Q8  findings and is recommended for at risk foot care every 10-12 weeks. I recommended over-the-counter lotion to be applied daily twice a day to bilateral lower extremities.     4. Call if any questions or occurrence of any foot and ankle related complications      5. Return in 3 months    Subjective:      Patient ID: Jayson Camarena is a 77 y.o. male. HPI:  Jayson Camarena is a 77 y.o. male who presents with painful, elongated toenails. They have difficulty applying their socks and shoes due to the elongation of the nails. The pressure within their shoe gear is painful and they have been unable to cut their nails adequately. Patient states pain is 1/10 in shoe gear. Pain with pressure. Requires at risk foot care.          The following portions of the patient's history were reviewed and updated as appropriate:   He  has a past medical history of Abnormal ECG (2021 OR 2022), Alcoholism ([address]) (1984), Arthritis (2008), Bilateral carotid artery stenosis, Callus, Cancer ([address]), Chronic diastolic heart failure ([address]), Chronic ischemic heart disease, Chronic kidney disease, Colon polyp, COPD (chronic obstructive pulmonary disease) ([address]), Coronary artery disease, COVID (11/2021), CPAP (continuous positive airway pressure) dependence, Disease of thyroid gland (2017), Emphysema of lung ([address]) (1995), Hearing loss, History of transfusion (1995), Hyperlipidemia, Hypertension, Intracranial aneurysm (04/25/2023), Lung nodule (2023), MI (myocardial infarction) ([address]) (1995), Myocardial infarction ([address]) (1995), Pneumonia, Pneumothorax (02/20/2023), Prostate cancer ([address]), RSV (respiratory syncytial virus infection) (10/2022), S/P carotid endarterectomy, Shortness of breath, Sleep apnea, Sleep apnea, obstructive, and Stented coronary artery.   He   Patient Active Problem List    Diagnosis Date Noted   • Erectile dysfunction 08/16/2023   • Other disorders of refraction 08/16/2023   • Occlusion and stenosis of unspecified carotid artery 08/16/2023   • Dysphagia 05/04/2023   • Congestive heart failure with left ventricular systolic dysfunction (LVSD) ([address]) 04/28/2023   • Hypoxemia 04/28/2023   • Intracranial aneurysm 04/25/2023   • Allergic rhinitis, unspecified 04/17/2023   • Chronic kidney disease, stage 3a ([address]) 04/17/2023   • Generalized muscle weakness 04/17/2023   • Hypertensive heart and chronic kidney disease with heart failure and stage 1 through stage 4 chronic kidney disease, or unspecified chronic kidney disease ([address]) 04/17/2023   • Need for assistance with personal care 04/17/2023   • Other abnormalities of gait and mobility 04/17/2023   • Retention of urine, unspecified 04/17/2023   • Sudden idiopathic hearing loss, right ear 04/17/2023   • Acute and chronic respiratory failure with hypoxia ([address]) 04/17/2023   • Elevated troponin 04/04/2023   • Sepsis due to pneumonia ([address]) 04/03/2023   • Orthopnea 03/21/2023   • Lower extremity edema 03/21/2023   • Irregular heart rhythm 03/21/2023   • Leukocytosis 02/21/2023   • Left leg pain 01/25/2023   • Transient right leg weakness 01/25/2023   • Hypokalemia 01/11/2023   • Ambulatory dysfunction 01/09/2023   • COPD (chronic obstructive pulmonary disease) ([address]) 01/09/2023   • Sensorineural hearing loss, bilateral 10/19/2022   • Chronic respiratory failure with hypoxia ([address]) 10/15/2022   • Prostate cancer ([address]) 09/27/2022   • Elevated MCV 09/08/2022   • Cubital tunnel syndrome on left 07/22/2022   • Carpal tunnel syndrome on left 07/22/2022   • 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   • Urinary frequency 04/27/2022   • Incomplete bladder emptying 04/27/2022   • Chronic bilateral low back pain with right-sided sciatica 04/18/2022   • Mid back pain 04/18/2022   • Thoracic radiculopathy 04/18/2022   • Chronic pain syndrome 04/18/2022   • Hoarseness or changing voice 04/14/2022   • Chronic right-sided thoracic back pain 03/05/2022   • Primary insomnia 03/02/2022   • Right hip pain 01/20/2022   • Abnormal nuclear stress test 03/18/2021   • Costochondral chest pain 01/15/2021   • Chronic obstructive pulmonary disease with acute exacerbation ([address]) 01/11/2021   • Oxygen dependent 01/11/2021   • S/P carotid endarterectomy 01/11/2021   • Stented coronary artery 01/11/2021   • Vertigo 01/11/2021   • Anisometropia 01/11/2021   • Osteoarthritis of spinal facet joint 01/11/2021   • Chronic ischemic heart disease 01/11/2021   • Chronic diastolic (congestive) heart failure ([address]) 01/11/2021   • Diverticular disease of colon 01/11/2021   • Dry eye syndrome 01/11/2021   • GERD (gastroesophageal reflux disease) 01/11/2021   • Acute hearing loss, right 01/11/2021   • Elevated PSA 01/11/2021   • Hypoxia 01/11/2021   • Lens replaced by other means 01/11/2021   • Lumbar radiculopathy 01/11/2021   • Multinodular goiter 01/11/2021   • Nuclear senile cataract 01/11/2021   • Obesity 01/11/2021   • Occlusion and stenosis of carotid artery 01/11/2021   • Osteoarthritis of hip 01/11/2021   • Prediabetes 01/11/2021   • LAMBERTO on CPAP 01/11/2021   • Solitary pulmonary nodule 01/11/2021   • Thyroid nodule 01/11/2021   • Guyon syndrome, 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   • Depression, recurrent ([address])    • Hyperlipidemia    • Coronary artery disease involving native coronary artery of native heart without angina pectoris    • Left carotid artery occlusion    • Hypertension      He  has a past surgical history that includes Colonoscopy; Skin cancer excision (2012); Eye surgery; Cardiac catheterization (03/18/2021); Coronary angioplasty with stent (1999); Coronary angioplasty with stent (2001); Coronary angioplasty with stent (2003); Appendectomy; pr neuroplasty &/transposition ulnar nerve elbow (Left, 07/22/2022); pr neuroplasty &/transpos median nrv carpal tunne (Left, 07/22/2022); pr neuroplasty &/transposition ulnar nerve wrist (Left, 07/22/2022); pr bx prostate strtctc saturation sampling img gid (N/A, 09/13/2022); Cataract extraction, bilateral (Bilateral); and Tonsillectomy (no). .    Review of Systems   All other systems reviewed and are negative. Objective:      /56   Pulse 92   Ht 5' 8" (1.727 m)   BMI 29.04 kg/m²          Physical Exam  Vitals reviewed. Feet:      Comments: On exam patient has thickened, hypertrophic, discolored, brittle toenails with subungual debris and tenderness x10. Patient has diminished pedal pulses and decreased perfusion to the lower extremities  Patient has significant trophic changes to the skin including thick toenails, decreased pedal hair and atrophic skin.

## 2023-08-24 ENCOUNTER — PATIENT OUTREACH (OUTPATIENT)
Dept: CASE MANAGEMENT | Facility: OTHER | Age: 78
End: 2023-08-24

## 2023-08-24 NOTE — PROGRESS NOTES
Outpatient Care Management Note:  Outreach call placed to Mr. Lior Haro with his daughter, Marycruz Schreiber, who is the . Introduced myself and the [employer] program.  Explained that it is a voluntary program involving myself (an RN), a SW CM and CM OC as needed to assist with symptom management and social needs that may be present. Mr. Neena Conrad lives with his daughter and her . Marycruz Schreiber does not feel that her father would benefit from the [facility] [employer] [facility].  Marycruz Schreiber is aware of symptoms that need to be reported to provider.

## 2023-08-29 ENCOUNTER — OFFICE VISIT (OUTPATIENT)
Dept: CARDIOLOGY CLINIC | Facility: CLINIC | Age: 78
End: 2023-08-29
Payer: COMMERCIAL

## 2023-08-29 VITALS
HEART RATE: 84 BPM | DIASTOLIC BLOOD PRESSURE: 60 MMHG | HEIGHT: 68 IN | BODY MASS INDEX: 27.89 KG/M2 | SYSTOLIC BLOOD PRESSURE: 94 MMHG | WEIGHT: 184 LBS

## 2023-08-29 DIAGNOSIS — E87.6 HYPOKALEMIA: ICD-10-CM

## 2023-08-29 DIAGNOSIS — I25.10 CORONARY ARTERY DISEASE INVOLVING NATIVE CORONARY ARTERY OF NATIVE HEART WITHOUT ANGINA PECTORIS: Chronic | ICD-10-CM

## 2023-08-29 DIAGNOSIS — I50.20 HFREF (HEART FAILURE WITH REDUCED EJECTION FRACTION) (HCC): Primary | ICD-10-CM

## 2023-08-29 DIAGNOSIS — I25.9 CHRONIC ISCHEMIC HEART DISEASE: ICD-10-CM

## 2023-08-29 PROCEDURE — 99214 OFFICE O/P EST MOD 30 MIN: CPT | Performed by: INTERNAL MEDICINE

## 2023-08-29 RX ORDER — POTASSIUM CHLORIDE 20 MEQ/1
20 TABLET, EXTENDED RELEASE ORAL 2 TIMES DAILY
Qty: 60 TABLET | Refills: 3 | Status: SHIPPED | OUTPATIENT
Start: 2023-08-29 | End: 2023-09-06

## 2023-08-29 RX ORDER — FUROSEMIDE 40 MG/1
40 TABLET ORAL DAILY
Qty: 90 TABLET | Refills: 2 | Status: SHIPPED | OUTPATIENT
Start: 2023-08-29 | End: 2023-09-06

## 2023-08-29 NOTE — PROGRESS NOTES
Cardiology Follow Up    Maria G Delacruz  [id number]  1945  [facility] [address] [address]      1. HFrEF (heart failure with reduced ejection fraction) [facility])  Ambulatory Referral to Cardiology      2. Chronic ischemic heart disease  furosemide (LASIX) 40 mg tablet      3. Hypokalemia  potassium chloride (K-DUR,KLOR-CON) 20 mEq tablet      4. Coronary artery disease involving native coronary artery of native heart without angina pectoris            Discussion/Summary:  1. Heart failure with reduced ejection fraction  2. Coronary artery disease with nonobstructive disease on cardiac catheterization 2021  3. Hypertension  4. Hyperlipidemia  5. PVCs  6.   COPD on chronic 3 L nasal cannula oxygen    -Transthoracic echocardiogram 8/18/2023 showing left ventricular systolic function moderately reduced estimated LVEF 35% with grade 1 diastolic function, mildly dilated with mildly reduced right ventricular systolic function with mild tricuspid regurgitation, aortic sclerosis  -Unfortunately due to hypotension I am unable to uptitrate patient's medical therapy at this time but will continue furosemide 40 mg daily, potassium 20 mEq twice daily, metoprolol succinate 12.5 mg daily on Crestor 40 mg daily and aspirin 81 mg daily.  -We will have patient seen and evaluated by advanced heart failure team for assistance with up titration of medical therapy or alternative medical therapy options for patient  -Patient and family counseled on dietary and lifestyle modifications including use of compression stockings, following sodium and fluid restricted diet to sodium restriction to less than 1800 mg of sodium daily fluid restriction to less than 1800 mL of fluid daily and as patient has had unexplained/unintentional weight loss recently will need to monitor closely how he is feeling as weights at home may be downtrending despite volume overload. -I will see patient in 1 month or sooner if necessary  -Patient counseled if he were to have any warning or alarm type symptoms he is to seek emergency medical care immediately. History of Present Illness:  -Patient is a 50-year-old male with hypertension, hyperlipidemia, obesity history, COPD with chronic nasal cannula oxygen use on 3 L nasal cannula oxygen along with chronic antibiotic use, obstructive sleep apnea compliant with CPAP therapy, history of coronary artery disease with prior MI in 1995 with PCI to RCA, stenting in 1999 and in 2001 involving the RCA then again in 2003 with stenting to LAD with cardiac catheterization in March 2021 showing nonobstructive coronary artery disease with no need for revascularization at that time along with asymptomatic PVCs hospitalized multiple times over the past year most recently for volume overload along with concern for COPD exacerbation but had had a history of pneumonias and pneumonitis requiring IV steroid therapy who presents to the office today for follow-up.  -Patient does note lower extremity edema that resolves with leg elevation at night and then worsens while on his feet throughout the day. He notes significant urine output with his furosemide 40 mg daily to the point where he runs to the bathroom throughout the day multiple times per day. He denies any active worsening of his shortness of breath here in the office today or chest discomfort but is very hard of hearing even with his hearing aid in place.  -On home blood pressure readings patient has had low blood pressures with readings averaging in the low 220 mmHg systolic with diastolics in the 67B mmHg range.     Patient Active Problem List   Diagnosis   • Hyperlipidemia   • Coronary artery disease involving native coronary artery of native heart without angina pectoris   • Left carotid artery occlusion   • Hypertension   • Chronic obstructive pulmonary disease with acute exacerbation ([facility])   • Oxygen dependent   • Depression, recurrent ([facility])   • S/P carotid endarterectomy   • Stented coronary artery   • Vertigo   • Anisometropia   • Osteoarthritis of spinal facet joint   • Chronic ischemic heart disease   • Chronic diastolic (congestive) heart failure ([facility])   • Diverticular disease of colon   • Dry eye syndrome   • GERD (gastroesophageal reflux disease)   • Acute hearing loss, right   • Elevated PSA   • Hypoxia   • Lens replaced by other means   • Lumbar radiculopathy   • Multinodular goiter   • Nuclear senile cataract   • Obesity   • Occlusion and stenosis of carotid artery   • Osteoarthritis of hip   • Prediabetes   • LAMBERTO on CPAP   • Solitary pulmonary nodule   • Thyroid nodule   • Guyon syndrome, left   • Costochondral chest pain   • Abnormal nuclear stress test   • Right hip pain   • Primary insomnia   • Chronic right-sided thoracic back pain   • Hoarseness or changing voice   • Chronic bilateral low back pain with right-sided sciatica   • Mid back pain   • Thoracic radiculopathy   • Chronic pain syndrome   • Urinary frequency   • Incomplete bladder emptying   • Intercostal neuralgia   • Cubital tunnel syndrome on left   • Carpal tunnel syndrome on left   • Elevated MCV   • Prostate cancer ([facility])   • Chronic respiratory failure with hypoxia ([facility])   • Sensorineural hearing loss, bilateral   • Ambulatory dysfunction   • COPD (chronic obstructive pulmonary disease) ([facility])   • Hypokalemia   • Left leg pain   • Transient right leg weakness   • Leukocytosis   • Orthopnea   • Lower extremity edema   • Irregular heart rhythm   • Sepsis due to pneumonia ([facility])   • Elevated troponin   • Intracranial aneurysm   • Congestive heart failure with left ventricular systolic dysfunction (LVSD) ([facility])   • Hypoxemia   • Dysphagia   • Erectile dysfunction   • Allergic rhinitis, unspecified   • Chronic kidney disease, stage 3a ([address])   • Generalized muscle weakness   • Hypertensive heart and chronic kidney disease with heart failure and stage 1 through stage 4 chronic kidney disease, or unspecified chronic kidney disease (HCC)   • Need for assistance with personal care   • Other abnormalities of gait and mobility   • Retention of urine, unspecified   • Sudden idiopathic hearing loss, right ear   • Acute and chronic respiratory failure with hypoxia ([facility])   • Other disorders of refraction   • Occlusion and stenosis of unspecified carotid artery     Past Medical History:   Diagnosis Date   • Abnormal ECG 2021 OR 2022   • Alcoholism ([address]) 1984    sober 38 years   • Arthritis 2008    beginning   • Bilateral carotid artery stenosis    • Callus    • Cancer ([facility])     skin   • Chronic diastolic heart failure ([facility])    • Chronic ischemic heart disease    • Chronic kidney disease     stage 3   • Colon polyp    • COPD (chronic obstructive pulmonary disease) ([facility])    • Coronary artery disease     hx stents, MI, PCI   • COVID 11/2021   • CPAP (continuous positive airway pressure) dependence    • Disease of thyroid gland 2017    nodules   • Emphysema of lung ([address]) 1995    started   • Hearing loss    • History of transfusion 1995   • Hyperlipidemia    • Hypertension    • Intracranial aneurysm 04/25/2023   • Lung nodule 2023    x rays   • MI (myocardial infarction) ([address]) 1995   • Myocardial infarction ([address]) 1995   • Pneumonia    • Pneumothorax 02/20/2023    collapsed lung   • Prostate cancer ([address])    • RSV (respiratory syncytial virus infection) 10/2022   • S/P carotid endarterectomy    • Shortness of breath     O2 2 l/nc PRN   • Sleep apnea    • Sleep apnea, obstructive    • Stented coronary artery      Social History     Socioeconomic History   • Marital status: /Civil Union     Spouse name: Not on file   • Number of children: Not on file   • Years of education: Not on file   • Highest education level: Not on file   Occupational History   • Not on file Tobacco Use   • Smoking status: Former     Packs/day: 2.00     Years: 35.00     Total pack years: 70.00     Types: Cigarettes     Start date: 1960     Quit date: 1995     Years since quittin.1   • Smokeless tobacco: Never   • Tobacco comments:     stopped smoking the day i had a heart attack   Vaping Use   • Vaping Use: Never used   Substance and Sexual Activity   • Alcohol use: Not Currently   • Drug use: Never   • Sexual activity: Not Currently     Partners: Female     Birth control/protection: None   Other Topics Concern   • Not on file   Social History Narrative   • Not on file     Social Determinants of Health     Financial Resource Strain: Unknown (2023)    Overall Financial Resource Strain (CARDIA)    • Difficulty of Paying Living Expenses: Patient refused   Food Insecurity: No Food Insecurity (2023)    Hunger Vital Sign    • Worried About Running Out of Food in the Last Year: Never true    • Ran Out of Food in the Last Year: Never true   Transportation Needs: Unknown (2023)    PRAPARE - Transportation    • Lack of Transportation (Medical): Patient refused    • Lack of Transportation (Non-Medical): Patient refused   Physical Activity: Not on file   Stress: Not on file   Social Connections: Not on file   Intimate Partner Violence: Not on file   Housing Stability: Low Risk  (2023)    Housing Stability Vital Sign    • Unable to Pay for Housing in the Last Year: No    • Number of Places Lived in the Last Year: 1    • Unstable Housing in the Last Year: No      Family History   Problem Relation Age of Onset   • Heart attack Mother    • Dementia Mother    • Heart failure Mother             • Heart disease Mother         heart attacks (2)   • No Known Problems Father      Past Surgical History:   Procedure Laterality Date   • APPENDECTOMY     • CARDIAC CATHETERIZATION  2021    left main with no significant disease, proximal LAD with 10% stenosis at the site of prior stent, left circumflex artery with minimal luminal irregularities mid RCA with 50% stenosis at site of prior stent and PL segment with distal disease supplied by collaterals from the distal circumflex with no significant CAD requiring revascularization at that time.    • CATARACT EXTRACTION, BILATERAL Bilateral    • COLONOSCOPY     • CORONARY ANGIOPLASTY WITH STENT PLACEMENT  1999    RCA   • CORONARY ANGIOPLASTY WITH STENT PLACEMENT  2001    RCA   • CORONARY ANGIOPLASTY WITH STENT PLACEMENT  2003    LAD   • EYE SURGERY     • MD BX PROSTATE STRTCTC SATURATION SAMPLING IMG GID N/A 09/13/2022    Procedure: TRANSPERINEAL MRI FUSION  BIOPSY PROSTATE;  Surgeon: Mitchel Tapia MD;  Location: [facility];  Service: Urology   • MD NEUROPLASTY &/TRANSPOS MEDIAN NRV Deveron Kendall Left 07/22/2022    Procedure: RELEASE CARPAL TUNNEL;  Surgeon: Suzanne Guadarrama MD;  Location: [facility];  Service: Orthopedics   • MD NEUROPLASTY &/TRANSPOSITION ULNAR NERVE ELBOW Left 07/22/2022    Procedure: RELEASE CUBITAL TUNNEL;  Surgeon: Suzanne Guadarrama MD;  Location[facility];  Service: Orthopedics   • MD NEUROPLASTY &/TRANSPOSITION ULNAR NERVE WRIST Left 07/22/2022    Procedure: GUYON'S CANAL RELEASE;  Surgeon: Suzanne Guadarrama MD;  Location: [facility];  Service: Orthopedics   • SKIN CANCER EXCISION  2012    chin-per pt, basal cell  also right ankle   • TONSILLECTOMY  no       Current Outpatient Medications:   •  aspirin 81 mg chewable tablet, 81 mg daily at bedtime, Disp: , Rfl:   •  azithromycin (ZITHROMAX) 250 mg tablet, Take 500 mg by mouth 3 (three) times a week Monday, Wednesday, Friday, Disp: , Rfl:   •  budesonide (PULMICORT) 0.5 mg/2 mL nebulizer solution, Take 2 mL (0.5 mg total) by nebulization 2 (two) times a day Rinse mouth after use., Disp: 180 mL, Rfl: 0  •  dextromethorphan-guaiFENesin (ROBITUSSIN DM)  mg/5 mL syrup, Take 5 mL by mouth 3 (three) times a day as needed for cough, Disp: 354 mL, Rfl: 0  •  famotidine (PEPCID) 20 mg tablet, TAKE 1 TABLET BY MOUTH DAILY AT  BEDTIME, Disp: 100 tablet, Rfl: 2  •  formoterol (PERFOROMIST) 20 MCG/2ML nebulizer solution, Take 2 mL (20 mcg total) by nebulization 2 (two) times a day, Disp: 180 mL, Rfl: 5  •  furosemide (LASIX) 40 mg tablet, Take 1 tablet (40 mg total) by mouth daily, Disp: 90 tablet, Rfl: 2  •  gabapentin (NEURONTIN) 300 mg capsule, Take 1 capsule (300 mg total) by mouth daily at bedtime, Disp: , Rfl:   •  HYDROcodone-acetaminophen (Norco) 5-325 mg per tablet, Take 1 tablet by mouth every 6 (six) hours as needed for pain Max Daily Amount: 4 tablets, Disp: 15 tablet, Rfl: 0  •  ipratropium-albuterol (DUO-NEB) 0.5-2.5 mg/3 mL nebulizer solution, , Disp: , Rfl:   •  methocarbamol (ROBAXIN) 500 mg tablet, Take 1 tablet (500 mg total) by mouth 3 (three) times a day as needed for muscle spasms, Disp: 30 tablet, Rfl: 0  •  metoprolol succinate (TOPROL-XL) 25 mg 24 hr tablet, Take 0.5 tablets (12.5 mg total) by mouth daily, Disp: 45 tablet, Rfl: 3  •  naloxone (NARCAN) 4 mg/0.1 mL nasal spray, Administer 1 spray into a nostril. If no response after 2-3 minutes, give another dose in the other nostril using a new spray., Disp: 1 each, Rfl: 1  •  omeprazole (PriLOSEC) 20 mg delayed release capsule, Take 1 capsule (20 mg total) by mouth daily, Disp: 28 capsule, Rfl: 0  •  oxygen gas, Inhale 2 L/min continuous 2LPM at rest and 3-4 LPM with activity per D Cedric FANG notes, Disp: , Rfl:   •  potassium chloride (K-DUR,KLOR-CON) 20 mEq tablet, Take 1 tablet (20 mEq total) by mouth 2 (two) times a day, Disp: 60 tablet, Rfl: 3  •  predniSONE 10 mg tablet, Take 4 tablets (40 mg total) by mouth daily for 3 days, THEN 3 tablets (30 mg total) daily for 3 days, THEN 2 tablets (20 mg total) daily for 3 days, THEN 1 tablet (10 mg total) daily for 3 days. , Disp: 30 tablet, Rfl: 0  •  rosuvastatin (CRESTOR) 40 MG tablet, TAKE 1 TABLET DAILY (Patient taking differently: Take 40 mg by mouth daily at bedtime), Disp: 100 tablet, Rfl: 3  Allergies   Allergen Reactions   • Lisinopril Swelling and Cough   • Tetanus Antitoxin Anaphylaxis   • Tetanus Toxoid Anaphylaxis and Swelling         Labs:  No results displayed because visit has over 200 results.       Admission on 08/13/2023, Discharged on 08/13/2023   Component Date Value   • WBC 08/13/2023 14.55 (H)    • RBC 08/13/2023 4.33    • Hemoglobin 08/13/2023 13.3    • Hematocrit 08/13/2023 44.0    • MCV 08/13/2023 102 (H)    • MCH 08/13/2023 30.7    • MCHC 08/13/2023 30.2 (L)    • RDW 08/13/2023 13.9    • MPV 08/13/2023 10.8    • Platelets 14/97/4677 216    • nRBC 08/13/2023 0    • Neutrophils Relative 08/13/2023 89 (H)    • Immat GRANS % 08/13/2023 1    • Lymphocytes Relative 08/13/2023 4 (L)    • Monocytes Relative 08/13/2023 6    • Eosinophils Relative 08/13/2023 0    • Basophils Relative 08/13/2023 0    • Neutrophils Absolute 08/13/2023 13.02 (H)    • Immature Grans Absolute 08/13/2023 0.07    • Lymphocytes Absolute 08/13/2023 0.55 (L)    • Monocytes Absolute 08/13/2023 0.88    • Eosinophils Absolute 08/13/2023 0.01    • Basophils Absolute 08/13/2023 0.02    • Sodium 08/13/2023 139    • Potassium 08/13/2023 4.3    • Chloride 08/13/2023 100    • CO2 08/13/2023 31    • ANION GAP 08/13/2023 8    • BUN 08/13/2023 30 (H)    • Creatinine 08/13/2023 1.18    • Glucose 08/13/2023 134    • Calcium 08/13/2023 8.8    • AST 08/13/2023 23    • ALT 08/13/2023 20    • Alkaline Phosphatase 08/13/2023 55    • Total Protein 08/13/2023 5.8 (L)    • Albumin 08/13/2023 4.0    • Total Bilirubin 08/13/2023 0.42    • eGFR 08/13/2023 58    • hs TnI 0hr 08/13/2023 39    • Ventricular Rate 08/13/2023 104    • Atrial Rate 08/13/2023 104    • CT Interval 08/13/2023 162    • QRSD Interval 08/13/2023 98    • QT Interval 08/13/2023 350    • QTC Interval 08/13/2023 460    • P Axis 08/13/2023 74    • QRS Axis 08/13/2023 73    • T Wave Axis 08/13/2023 -30    • D-Dimer, Quant 08/13/2023 0.60 (H)    • BNP 08/13/2023 583 (H)    • SARS-CoV-2 08/13/2023 Negative    • hs TnI 2hr 08/13/2023 39    • Delta 2hr hsTnI 08/13/2023 0    • Ventricular Rate 08/13/2023 100    • Atrial Rate 08/13/2023 100    • NC Interval 08/13/2023 158    • QRSD Interval 08/13/2023 94    • QT Interval 08/13/2023 350    • QTC Interval 08/13/2023 451    • P Axis 08/13/2023 72    • QRS Axis 08/13/2023 73    • T Wave Axis 08/13/2023 -23    • Ventricular Rate 08/13/2023 97    • Atrial Rate 08/13/2023 97    • NC Interval 08/13/2023 156    • QRSD Interval 08/13/2023 96    • QT Interval 08/13/2023 362    • QTC Interval 08/13/2023 459    • P Axis 08/13/2023 72    • QRS Axis 08/13/2023 73    • T Wave Axis 08/13/2023 -24    Appointment on 08/09/2023   Component Date Value   • PSA, Diagnostic 08/09/2023 5.53 (H)    • Testosterone 08/09/2023 17 (L)    Office Visit on 08/02/2023   Component Date Value   • Ventricular Rate 08/02/2023 91    • Atrial Rate 08/02/2023 91    • NC Interval 08/02/2023 162    • QRSD Interval 08/02/2023 94    • QT Interval 08/02/2023 382    • QTC Interval 08/02/2023 469    • P Axis 08/02/2023 68    • QRS Axis 08/02/2023 52    • T Wave Axis 08/02/2023 -28    Appointment on 08/02/2023   Component Date Value   • Sodium 08/02/2023 143    • Potassium 08/02/2023 4.1    • Chloride 08/02/2023 102    • CO2 08/02/2023 31    • ANION GAP 08/02/2023 10    • BUN 08/02/2023 18    • Creatinine 08/02/2023 1.19    • Glucose, Fasting 08/02/2023 101 (H)    • Calcium 08/02/2023 9.3    • AST 08/02/2023 24    • ALT 08/02/2023 20    • Alkaline Phosphatase 08/02/2023 63    • Total Protein 08/02/2023 6.1 (L)    • Albumin 08/02/2023 4.3    • Total Bilirubin 08/02/2023 0.57    • eGFR 08/02/2023 57    Admission on 07/19/2023, Discharged on 07/19/2023   Component Date Value   • Ventricular Rate 07/19/2023 94    • Atrial Rate 07/19/2023 94    • NC Interval 07/19/2023 164    • QRSD Interval 07/19/2023 92    • QT Interval 07/19/2023 372    • QTC Interval 07/19/2023 465    • P Axis 07/19/2023 62    • QRS Axis 07/19/2023 67    • T Wave Axis 07/19/2023 44    • WBC 07/19/2023 9.81    • RBC 07/19/2023 4.11    • Hemoglobin 07/19/2023 12.9    • Hematocrit 07/19/2023 42.2    • MCV 07/19/2023 103 (H)    • MCH 07/19/2023 31.4    • MCHC 07/19/2023 30.6 (L)    • RDW 07/19/2023 14.6    • MPV 07/19/2023 10.6    • Platelets 83/82/8498 193    • nRBC 07/19/2023 0    • Neutrophils Relative 07/19/2023 77 (H)    • Immat GRANS % 07/19/2023 0    • Lymphocytes Relative 07/19/2023 11 (L)    • Monocytes Relative 07/19/2023 10    • Eosinophils Relative 07/19/2023 2    • Basophils Relative 07/19/2023 0    • Neutrophils Absolute 07/19/2023 7.52    • Immature Grans Absolute 07/19/2023 0.03    • Lymphocytes Absolute 07/19/2023 1.06    • Monocytes Absolute 07/19/2023 0.96    • Eosinophils Absolute 07/19/2023 0.20    • Basophils Absolute 07/19/2023 0.04    • Sodium 07/19/2023 142    • Potassium 07/19/2023 4.3    • Chloride 07/19/2023 107    • CO2 07/19/2023 29    • ANION GAP 07/19/2023 6    • BUN 07/19/2023 15    • Creatinine 07/19/2023 1.16    • Glucose 07/19/2023 95    • Calcium 07/19/2023 8.7    • AST 07/19/2023 19    • ALT 07/19/2023 17    • Alkaline Phosphatase 07/19/2023 55    • Total Protein 07/19/2023 5.4 (L)    • Albumin 07/19/2023 3.6    • Total Bilirubin 07/19/2023 0.45    • eGFR 07/19/2023 59    • hs TnI 0hr 07/19/2023 27    • BNP 07/19/2023 378 (H)    • Magnesium 07/19/2023 2.0    • hs TnI 2hr 07/19/2023 27    • Delta 2hr hsTnI 07/19/2023 0    • Ventricular Rate 07/19/2023 101    • Atrial Rate 07/19/2023 101    • NM Interval 07/19/2023 160    • QRSD Interval 07/19/2023 96    • QT Interval 07/19/2023 372    • QTC Interval 07/19/2023 482    • P Axis 07/19/2023 67    • QRS Axis 07/19/2023 67    • T Wave Axis 07/19/2023 8         Imaging: Echo complete w/ contrast if indicated    Result Date: 8/18/2023  Narrative: •  Left Ventricle: Left ventricular cavity size is mildly dilated.  Wall thickness is normal. The left ventricular ejection fraction is 35%. Systolic function is moderately reduced in a global manner. •  Right Ventricle: Right ventricular cavity size is mildly dilated. Systolic function is mildly reduced. Normal tricuspid annular plane systolic excursion (TAPSE) > 1.7 cm. •  Left Atrium: The atrium is mildly dilated. •  Aortic Valve: There is aortic valve sclerosis but no stenosis. •  Mitral Valve: There is mild regurgitation. •  Tricuspid Valve: There is mild regurgitation. The right ventricular systolic pressure is mildly elevated. The estimated right ventricular systolic pressure is 74.15 mmHg. XR chest 1 view portable    Result Date: 8/17/2023  Narrative: CHEST INDICATION:   Shortness of breath. COMPARISON: 08/13/2023 EXAM PERFORMED/VIEWS:  XR CHEST PORTABLE Images:  1 FINDINGS: Cardiomediastinal silhouette appears unremarkable. The pulmonary vessels are normal. Bibasilar infiltrates or atelectasis, right greater than left. Chronic changes. Small bilateral pleural effusions. There is no pneumothorax. Osseous structures appear within normal limits for patient age. Arthritic changes in both shoulders. Surgical clips in the right side of the neck. Impression: Bibasilar infiltrates or atelectasis. Workstation performed: [id number]     XR chest 1 view portable    Result Date: 8/14/2023  Narrative: CHEST INDICATION:   chest pain. COMPARISON: 8/2/2023 EXAM PERFORMED/VIEWS:  XR CHEST PORTABLE FINDINGS: Cardiomegaly noted as before. Patchy right basilar opacity and small bibasilar pleural effusions. Osseous structures appear within normal limits for patient age. Impression: Patchy right basilar opacity concerning for atelectasis versus infiltrate. Workstation performed: [id number]     CTA ED chest PE Study    Result Date: 8/13/2023  Narrative: CTA - CHEST WITH IV CONTRAST - PULMONARY ANGIOGRAM INDICATION:   Chest pain shortness of breath with an elevated D-dimer.  COMPARISON: 6/28/2023 TECHNIQUE: CTA examination of the chest was performed using angiographic technique according to a protocol specifically tailored to evaluate for pulmonary embolism. Multiplanar 2D reformatted images were created from the source data. In addition, coronal 3D MIP postprocessing was performed on the acquisition scanner. Radiation dose length product (DLP) for this visit:  435.81 mGy-cm . This examination, like all CT scans performed in the [facility], was performed utilizing techniques to minimize radiation dose exposure, including the use of iterative  reconstruction and automated exposure control. IV Contrast:  85 mL of iohexol (OMNIPAQUE) Because of borderline renal function, standard department hydration protocol was recommended to minimize risk of contrast induced nephropathy. FINDINGS: PULMONARY ARTERIAL TREE: No acute pulmonary embolism LUNGS: There is severe centrilobular emphysema. There is bibasilar dependent density, right greater than left, with basilar calcification, similar to the prior study. Additional patchy opacities seen previously have improved or resolved. There is a new calcified lesion in the left mainstem bronchus consistent with broncholith. There is mild bibasilar peribronchial thickening. PLEURA: There are small pleural effusions, right greater than left, increased from the prior study. HEART/GREAT VESSELS: There are elevated right heart pressures with reflux of contrast into the hepatic veins. No thoracic aortic aneurysm. MEDIASTINUM AND ZION: There is an enlarged right hilar lymph node measuring 1.4 x 1.6 cm, increased from the prior study. There is mildly increased subcarinal lymphadenopathy measuring up to 1.4 cm short axis. CHEST WALL AND LOWER NECK:   Unremarkable. VISUALIZED STRUCTURES IN THE UPPER ABDOMEN:  Unremarkable. OSSEOUS STRUCTURES:  No acute fracture or destructive osseous lesion. Impression: 1. No acute pulmonary embolism.  2.  Increased right greater than left pleural effusions. 3.  Bibasilar dependent patchy opacity with calcification, right greater than left, likely chronic atelectasis. Additional opacities seen on the prior study have improved or resolved. 4.  New right hilar and subcarinal lymphadenopathy, likely reactive, though short interval follow-up CT in 3 months is recommended to ensure resolution. 5.  New broncholith within the left mainstem bronchus, likely calcification of aspirated material as there was no adjacent calcification on the prior CT to indicate erosion. 6.  Severe centrilobular emphysema. The study was marked in [facility] for immediate notification. Workstation performed: [id number]     XR chest pa & lateral    Result Date: 8/4/2023  Narrative: CHEST INDICATION:   R05.9: Cough, unspecified. COMPARISON: Chest CT 6/28/2023, CXR 5/28/2023. EXAM PERFORMED/VIEWS:  XR CHEST PA & LATERAL. FINDINGS: Cardiomediastinal silhouette normal. Severe emphysema. Trace effusions with bibasilar atelectasis/scar. No pneumothorax. Upper abdomen normal. Bones normal for age. Impression: Severe emphysema. Trace effusions Mild bibasilar atelectasis/scar. Underlying pneumonia cannot be completely excluded in the appropriate clinical setting. Workstation performed: [id number]       Review of Systems:  Review of Systems   Constitutional: Positive for fatigue. Negative for chills, diaphoresis and fever. HENT: Positive for hearing loss. Negative for trouble swallowing and voice change. Eyes: Negative for pain and redness. Respiratory: Positive for shortness of breath. Negative for wheezing. Cardiovascular: Positive for leg swelling. Negative for chest pain and palpitations. Gastrointestinal: Negative for abdominal pain, constipation, diarrhea, nausea and vomiting. Genitourinary: Negative for dysuria. Musculoskeletal: Positive for arthralgias. Negative for neck pain and neck stiffness.    Neurological: Negative for dizziness, syncope, light-headedness and headaches. Psychiatric/Behavioral: Negative for agitation and confusion. All other systems reviewed and are negative. Vitals:    08/29/23 1411   BP: 94/60   Pulse: 84   Weight: 83.5 kg (184 lb)   Height: 5' 8" (1.727 m)     Vitals:    08/29/23 1411   Weight: 83.5 kg (184 lb)     Height: 5' 8" (172.7 cm)     Physical Exam:  Physical Exam  Vitals reviewed. Constitutional:       General: He is not in acute distress. Appearance: He is not diaphoretic. HENT:      Head: Normocephalic and atraumatic. Comments: Nasal cannula oxygen in place  Eyes:      General:         Right eye: No discharge. Left eye: No discharge. Neck:      Comments: Trachea midline, mild JVD present  Cardiovascular:      Rate and Rhythm: Normal rate and regular rhythm. Heart sounds: Murmur (BRENDEN) heard. Pulmonary:      Effort: No respiratory distress. Breath sounds: No wheezing. Comments: Decreased breath sounds bilaterally  Chest:      Chest wall: No tenderness. Musculoskeletal:      Right lower leg: Edema (1+) present. Left lower leg: Edema (1+) present. Skin:     General: Skin is warm and dry. Neurological:      Mental Status: He is alert. Comments: Awake, alert, hard of hearing but able to answer questions appropriately and move extremities bilaterally.    Psychiatric:         Mood and Affect: Mood normal.         Behavior: Behavior normal.

## 2023-09-01 ENCOUNTER — OFFICE VISIT (OUTPATIENT)
Dept: FAMILY MEDICINE CLINIC | Facility: CLINIC | Age: 78
End: 2023-09-01
Payer: COMMERCIAL

## 2023-09-01 VITALS
TEMPERATURE: 96.5 F | HEIGHT: 68 IN | SYSTOLIC BLOOD PRESSURE: 114 MMHG | OXYGEN SATURATION: 96 % | RESPIRATION RATE: 16 BRPM | BODY MASS INDEX: 28.16 KG/M2 | DIASTOLIC BLOOD PRESSURE: 60 MMHG | WEIGHT: 185.8 LBS | HEART RATE: 94 BPM

## 2023-09-01 DIAGNOSIS — J96.11 CHRONIC RESPIRATORY FAILURE WITH HYPOXIA (HCC): ICD-10-CM

## 2023-09-01 DIAGNOSIS — J34.89 LESION OF NOSE: Primary | ICD-10-CM

## 2023-09-01 DIAGNOSIS — E53.8 B12 DEFICIENCY: ICD-10-CM

## 2023-09-01 DIAGNOSIS — N18.31 CHRONIC KIDNEY DISEASE, STAGE 3A (HCC): ICD-10-CM

## 2023-09-01 DIAGNOSIS — J44.1 CHRONIC OBSTRUCTIVE PULMONARY DISEASE WITH ACUTE EXACERBATION (HCC): ICD-10-CM

## 2023-09-01 DIAGNOSIS — R73.03 PREDIABETES: ICD-10-CM

## 2023-09-01 DIAGNOSIS — I50.32 CHRONIC DIASTOLIC (CONGESTIVE) HEART FAILURE (HCC): Chronic | ICD-10-CM

## 2023-09-01 PROCEDURE — 99495 TRANSJ CARE MGMT MOD F2F 14D: CPT | Performed by: FAMILY MEDICINE

## 2023-09-01 NOTE — PROGRESS NOTES
Marshall Dang 1945 male MRN: [id number]    Family Medicine Transition of Care Visit      SUBJECTIVE    CC: MAGNUS HELTON Visit     Transitional Care Management Review:  Marshall Dang is a 77 y.o. male here for TCM follow up. He was admitted 8/16-8/19 for COPD exacerbation.  Hospital course as per discharge summary as below:    "Sepsis due to pneumonia [facility])  Assessment & Plan  • Patient with suspected aspiration pneumonia  • Initial procalcitonin normal, repeat normal as well  • Strep pneumo/urine Legionella negative  • Reviewed case with pulmonology  • Antibiotics have been discontinued per pulmonology recommendation  • Sputum culture with mixed respiratory robi  • Stable off antibiotics  • Follow-up with PCP as outpatient     Chronic obstructive pulmonary disease with acute exacerbation ([address])  Assessment & Plan  • Patient was on IV Solu-Medrol while in the hospital  • Clinically improved today  • Patient requiring 3 L nasal cannula which is his home oxygen requirement  • Continue nightly CPAP  • Continue home nebulizers  • Pulmonology input appreciated  • Will discharge on prednisone taper  • Follow-up with pulmonology as outpatient    At baseline oxygen requirement  Prednisone taper finishing  Pulmonology 10/2.      Acute and chronic respiratory failure with hypoxia ([address])  Assessment & Plan  • Secondary to COPD exacerbation and suspected pneumonia  • Possible component of fluid overload given bilateral effusions  • Pulmonology input appreciated  • Patient received IV Lasix while in the hospital, diuresed appropriately  • Currently back to home oxygen requirement  • Discharge home with outpatient pulmonology follow-up     Chronic kidney disease, stage 3a [facility])  Assessment & Plan  Lab Results   Component Value Date     EGFR 58 08/19/2023     EGFR 61 08/18/2023     EGFR 51 08/17/2023     CREATININE 1.18 08/19/2023     CREATININE 1.13 08/18/2023     CREATININE 1.30 08/17/2023      Creatinine appears to be at baseline. Follow-up routine labs with PCP as outpatient    Follow-up blood work ordered.      LAMBERTO on CPAP  Assessment & Plan  Continue prehospital CPAP nightly     Chronic diastolic (congestive) heart failure (HCC)  Assessment & Plan      Wt Readings from Last 3 Encounters:   08/18/23 86.6 kg (191 lb)   08/02/23 86.6 kg (191 lb)   07/27/23 86.6 kg (191 lb)      • Placed on no added salt diet  • Continue prehospital Toprol-XL 12.5 mg p.o. daily  • Continue Lasix as needed    Met with cardiology this week, Lasix changed to 40 mg daily with potassium 20 mEq BID.      Hypertension  Assessment & Plan  Continue prehospital Toprol-XL 12.5 mg p.o. daily  Lasix as needed     Hyperlipidemia  Assessment & Plan  Continue home statin"    "Can Rudd is a 77 y.o. male patient who originally presented to the hospital on 8/16/2023 due to shortness of breath. Patient initially admitted with suspected pneumonia. Patient was started on IV antibiotics. Pulmonology was consulted. CT did show bilateral effusions. Infectious work-up essentially negative and antibiotics were discontinued per pulmonology recommendation. Patient was given IV diuresis and did clinically improved. Patient was also maintained on IV steroids and nebulizers. Patient currently requiring 3 L nasal cannula which patient is on continuously at home. Patient is stable for discharge home with outpatient follow-up. Will discharge on prednisone taper. Advised to follow-up with pulmonology as outpatient.   Patient did note to have lymphadenopathy on CT chest, advised to follow-up with pulmonology for repeat imaging in 3 months."    During the [facility] phone call patient stated:    TCM Call     Date and time call was made  8/21/2023 10:56 AM    Hospital care reviewed  Records reviewed    Patient was hospitialized at  [address] [facility]    Date of Admission  08/16/23    Date of discharge  08/19/23    Diagnosis  Spsis due to pneumonia    Disposition  Home    Current Symptoms  Shortness of breath; Fatigue    Cough Severity  Moderate    Shortness of breath severity  Moderate      TCM Call     Post hospital issues  None    Scheduled for follow up? Yes    Did you obtain your prescribed medications  Yes    Do you need help managing your prescriptions or medications  No    Is transportation to your appointment needed  No    I have advised the patient to call PCP with any new or worsening symptoms  Colette TREVINO    Living Arrangements  Children; Spouse or Significiant other    Support System  Children; Spouse    The type of support provided  Emotional; Physical; Other (comment)    Do you have social support  Yes, as much as I need          During today's visit:    He reports a scab on inside of nose, right nose, feels fullness with it. Scab comes loose. Recommended to meet with ENT sooner. Blood work ordered. They are complying with their medication changes and discharge instructions. They have the following questions: As above     Review of Systems   All other systems reviewed and are negative.       Historical Information     The patient history was reviewed as follows:    Past Medical History:   Diagnosis Date   • Abnormal ECG 2021 OR 2022   • Alcoholism ([address]) 1984    sober 45 years   • Arthritis 2008    beginning   • Bilateral carotid artery stenosis    • Callus    • Cancer (HCC)     skin   • Chronic diastolic heart failure (HCC)    • Chronic ischemic heart disease    • Chronic kidney disease     stage 3   • Colon polyp    • COPD (chronic obstructive pulmonary disease) (HCC)    • Coronary artery disease     hx stents, MI, PCI   • COVID 11/2021   • CPAP (continuous positive airway pressure) dependence    • Disease of thyroid gland 2017    nodules   • Emphysema of lung ([address]) 1995    started   • Hearing loss    • History of transfusion 1995   • Hyperlipidemia    • Hypertension    • Intracranial aneurysm 04/25/2023   • Lung nodule 2023    x rays   • MI (myocardial infarction) ([address]) 1995   • Myocardial infarction ([address]) 1995   • Pneumonia    • Pneumothorax 02/20/2023    collapsed lung   • Prostate cancer ([address])    • RSV (respiratory syncytial virus infection) 10/2022   • S/P carotid endarterectomy    • Shortness of breath     O2 2 l/nc PRN   • Sleep apnea    • Sleep apnea, obstructive    • Stented coronary artery      Past Surgical History:   Procedure Laterality Date   • APPENDECTOMY     • CARDIAC CATHETERIZATION  03/18/2021    left main with no significant disease, proximal LAD with 10% stenosis at the site of prior stent, left circumflex artery with minimal luminal irregularities mid RCA with 50% stenosis at site of prior stent and PL segment with distal disease supplied by collaterals from the distal circumflex with no significant CAD requiring revascularization at that time.    • CATARACT EXTRACTION, BILATERAL Bilateral    • COLONOSCOPY     • CORONARY ANGIOPLASTY WITH STENT PLACEMENT  1999    RCA   • CORONARY ANGIOPLASTY WITH STENT PLACEMENT  2001    RCA   • CORONARY ANGIOPLASTY WITH STENT PLACEMENT  2003    LAD   • EYE SURGERY     • WY BX PROSTATE STRTCTC SATURATION SAMPLING IMG GID N/A 09/13/2022    Procedure: TRANSPERINEAL MRI FUSION  BIOPSY PROSTATE;  Surgeon: Jenaro Engel MD;  [facility]: [facility];  Service: Urology   • WY NEUROPLASTY &/TRANSPOS MEDIAN NRV CARPAL [place] Left 07/22/2022    Procedure: RELEASE CARPAL TUNNEL;  Surgeon: Ollie Morillo MD;  Location: [facility];  Service: Orthopedics   • WY NEUROPLASTY &/TRANSPOSITION ULNAR NERVE ELBOW Left 07/22/2022    Procedure: RELEASE CUBITAL TUNNEL;  Surgeon: Ollie Morillo MD;  [facility];  Service: Orthopedics   • WY NEUROPLASTY &/TRANSPOSITION ULNAR NERVE WRIST Left 07/22/2022    Procedure: Kathren Hora RELEASE;  Surgeon: Ollie Morillo MD;  Location: [facility];  Service: Orthopedics   • SKIN CANCER EXCISION  2012    chin-per pt, basal cell  also right ankle   • TONSILLECTOMY  no     Family History   Problem Relation Age of Onset   • Heart attack Mother    • Dementia Mother    • Heart failure Mother             • Heart disease Mother         heart attacks (2)   • No Known Problems Father       Social History   Social History     Substance and Sexual Activity   Alcohol Use Not Currently     Social History     Substance and Sexual Activity   Drug Use Never     Social History     Tobacco Use   Smoking Status Former   • Packs/day: 2.00   • Years: 35.00   • Total pack years: 70.00   • Types: Cigarettes   • Start date: 1960   • Quit date: 1995   • Years since quittin.1   Smokeless Tobacco Never   Tobacco Comments    stopped smoking the day i had a heart attack       Medications:   Meds/Allergies     Current Outpatient Medications:   •  aspirin 81 mg chewable tablet, 81 mg daily at bedtime, Disp: , Rfl:   •  azithromycin (ZITHROMAX) 250 mg tablet, Take 500 mg by mouth 3 (three) times a week Monday, Wednesday, Friday, Disp: , Rfl:   •  budesonide (PULMICORT) 0.5 mg/2 mL nebulizer solution, Take 2 mL (0.5 mg total) by nebulization 2 (two) times a day Rinse mouth after use., Disp: 180 mL, Rfl: 0  •  dextromethorphan-guaiFENesin (ROBITUSSIN DM)  mg/5 mL syrup, Take 5 mL by mouth 3 (three) times a day as needed for cough, Disp: 354 mL, Rfl: 0  •  famotidine (PEPCID) 20 mg tablet, TAKE 1 TABLET BY MOUTH DAILY AT  BEDTIME, Disp: 100 tablet, Rfl: 2  •  formoterol (PERFOROMIST) 20 MCG/2ML nebulizer solution, Take 2 mL (20 mcg total) by nebulization 2 (two) times a day, Disp: 180 mL, Rfl: 5  •  furosemide (LASIX) 40 mg tablet, Take 1 tablet (40 mg total) by mouth daily, Disp: 90 tablet, Rfl: 2  •  gabapentin (NEURONTIN) 300 mg capsule, Take 1 capsule (300 mg total) by mouth daily at bedtime, Disp: , Rfl:   •  HYDROcodone-acetaminophen (Norco) 5-325 mg per tablet, Take 1 tablet by mouth every 6 (six) hours as needed for pain Max Daily Amount: 4 tablets, Disp: 15 tablet, Rfl: 0  • ipratropium-albuterol (DUO-NEB) 0.5-2.5 mg/3 mL nebulizer solution, , Disp: , Rfl:   •  methocarbamol (ROBAXIN) 500 mg tablet, Take 1 tablet (500 mg total) by mouth 3 (three) times a day as needed for muscle spasms, Disp: 30 tablet, Rfl: 0  •  metoprolol succinate (TOPROL-XL) 25 mg 24 hr tablet, Take 0.5 tablets (12.5 mg total) by mouth daily, Disp: 45 tablet, Rfl: 3  •  naloxone (NARCAN) 4 mg/0.1 mL nasal spray, Administer 1 spray into a nostril. If no response after 2-3 minutes, give another dose in the other nostril using a new spray., Disp: 1 each, Rfl: 1  •  omeprazole (PriLOSEC) 20 mg delayed release capsule, Take 1 capsule (20 mg total) by mouth daily, Disp: 28 capsule, Rfl: 0  •  oxygen gas, Inhale 2 L/min continuous 2LPM at rest and 3-4 LPM with activity per D Cedric FANG notes, Disp: , Rfl:   •  potassium chloride (K-DUR,KLOR-CON) 20 mEq tablet, Take 1 tablet (20 mEq total) by mouth 2 (two) times a day, Disp: 60 tablet, Rfl: 3  •  rosuvastatin (CRESTOR) 40 MG tablet, TAKE 1 TABLET DAILY (Patient taking differently: Take 40 mg by mouth daily at bedtime), Disp: 100 tablet, Rfl: 3  Allergies   Allergen Reactions   • Lisinopril Swelling and Cough   • Tetanus Antitoxin Anaphylaxis   • Tetanus Toxoid Anaphylaxis and Swelling       OBJECTIVE    Vitals:   Vitals:    09/01/23 1111   BP: 114/60   Pulse: 94   Resp: 16   Temp: (!) 96.5 °F (35.8 °C)   TempSrc: Tympanic   SpO2: 96%   Weight: 84.3 kg (185 lb 12.8 oz)   Height: 5' 8" (1.727 m)       Body mass index is 28.25 kg/m². Physical Exam:    Physical Exam  Vitals reviewed. Constitutional:       General: He is not in acute distress. Appearance: Normal appearance. He is not ill-appearing, toxic-appearing or diaphoretic. HENT:      Nose: Congestion present. Comments: Scabbing right nare  Cardiovascular:      Rate and Rhythm: Normal rate and regular rhythm. Heart sounds: Normal heart sounds. No murmur heard. No friction rub. No gallop. Comments: Occasional PVC  Pulmonary:      Effort: Pulmonary effort is normal. No respiratory distress. Breath sounds: No stridor. Wheezing present. No rhonchi. Skin:     General: Skin is warm. Neurological:      Mental Status: He is alert and oriented to person, place, and time. Motor: No weakness. Psychiatric:         Mood and Affect: Mood normal.         Behavior: Behavior normal.          Labs: I have personally reviewed all pertinent results. No results displayed because visit has over 200 results. Imaging:  I have personally reviewed all pertinent results. Assessment/Plan    No problem-specific Assessment & Plan notes found for this encounter. Shirley Han was seen today for transition of care management. Diagnoses and all orders for this visit:    Lesion of nose    Chronic obstructive pulmonary disease with acute exacerbation (HCC)    Chronic respiratory failure with hypoxia (HCC)    Chronic kidney disease, stage 3a (HCC)  -     Comprehensive metabolic panel; Future  -     CBC and differential; Future  -     Lipid Panel with Direct LDL reflex; Future    Chronic diastolic (congestive) heart failure (HCC)  -     Comprehensive metabolic panel; Future  -     CBC and differential; Future  -     Lipid Panel with Direct LDL reflex; Future    B12 deficiency  -     Vitamin B12; Future    Prediabetes  -     Comprehensive metabolic panel; Future  -     CBC and differential; Future  -     Lipid Panel with Direct LDL reflex; Future  -     HEMOGLOBIN A1C W/ EAG ESTIMATION;  Future          Future Appointments   Date Time Provider [address]   9/6/2023  2:20 PM Xenia Dominguez MD [facility]   9/14/2023  1:00  N [facility] Pulm Rb [address]   9/29/2023  3:20 PM Supriya Garnica DO BM Cardio [facility]   10/2/2023 11:00 AM Liz Lopez, DO PULM COAL [facility]   10/20/2023 12:40 PM Phillip Cummings, DO PALM PC [facility]   11/29/2023  2:00 PM Julee Brock DPM POD PAL [facility]   12/13/2023  3:00 PM Sonja Gan.  SPA [facility] AUD  SPA   1/11/2024 11:00 AM Rhonda Sharma PA-C CTR [facility]   2/15/2024  2:30 PM Madhu Rader MD [address] [facility] ENT [facility], [address] [place] [address] Primary Care

## 2023-09-02 ENCOUNTER — APPOINTMENT (OUTPATIENT)
Dept: LAB | Facility: HOSPITAL | Age: 78
End: 2023-09-02
Payer: COMMERCIAL

## 2023-09-02 DIAGNOSIS — N18.31 CHRONIC KIDNEY DISEASE, STAGE 3A (HCC): ICD-10-CM

## 2023-09-02 DIAGNOSIS — E53.8 B12 DEFICIENCY: ICD-10-CM

## 2023-09-02 DIAGNOSIS — R73.03 PREDIABETES: ICD-10-CM

## 2023-09-02 DIAGNOSIS — I50.32 CHRONIC DIASTOLIC (CONGESTIVE) HEART FAILURE (HCC): Chronic | ICD-10-CM

## 2023-09-02 LAB
ALBUMIN SERPL BCP-MCNC: 3.8 G/DL (ref 3.5–5)
ALP SERPL-CCNC: 81 U/L (ref 34–104)
ALT SERPL W P-5'-P-CCNC: 36 U/L (ref 7–52)
ANION GAP SERPL CALCULATED.3IONS-SCNC: 8 MMOL/L
AST SERPL W P-5'-P-CCNC: 27 U/L (ref 13–39)
BASOPHILS # BLD AUTO: 0.03 THOUSANDS/ÂΜL (ref 0–0.1)
BASOPHILS NFR BLD AUTO: 0 % (ref 0–1)
BILIRUB SERPL-MCNC: 0.54 MG/DL (ref 0.2–1)
BUN SERPL-MCNC: 16 MG/DL (ref 5–25)
CALCIUM SERPL-MCNC: 8.9 MG/DL (ref 8.4–10.2)
CHLORIDE SERPL-SCNC: 102 MMOL/L (ref 96–108)
CHOLEST SERPL-MCNC: 159 MG/DL
CO2 SERPL-SCNC: 34 MMOL/L (ref 21–32)
CREAT SERPL-MCNC: 1.05 MG/DL (ref 0.6–1.3)
EOSINOPHIL # BLD AUTO: 0.24 THOUSAND/ÂΜL (ref 0–0.61)
EOSINOPHIL NFR BLD AUTO: 2 % (ref 0–6)
ERYTHROCYTE [DISTWIDTH] IN BLOOD BY AUTOMATED COUNT: 14.4 % (ref 11.6–15.1)
GFR SERPL CREATININE-BSD FRML MDRD: 67 ML/MIN/1.73SQ M
GLUCOSE P FAST SERPL-MCNC: 80 MG/DL (ref 65–99)
HCT VFR BLD AUTO: 48.9 % (ref 36.5–49.3)
HDLC SERPL-MCNC: 73 MG/DL
HGB BLD-MCNC: 14.7 G/DL (ref 12–17)
IMM GRANULOCYTES # BLD AUTO: 0.07 THOUSAND/UL (ref 0–0.2)
IMM GRANULOCYTES NFR BLD AUTO: 1 % (ref 0–2)
LDLC SERPL CALC-MCNC: 52 MG/DL (ref 0–100)
LYMPHOCYTES # BLD AUTO: 1.19 THOUSANDS/ÂΜL (ref 0.6–4.47)
LYMPHOCYTES NFR BLD AUTO: 11 % (ref 14–44)
MCH RBC QN AUTO: 30.8 PG (ref 26.8–34.3)
MCHC RBC AUTO-ENTMCNC: 30.1 G/DL (ref 31.4–37.4)
MCV RBC AUTO: 103 FL (ref 82–98)
MONOCYTES # BLD AUTO: 0.92 THOUSAND/ÂΜL (ref 0.17–1.22)
MONOCYTES NFR BLD AUTO: 8 % (ref 4–12)
NEUTROPHILS # BLD AUTO: 8.71 THOUSANDS/ÂΜL (ref 1.85–7.62)
NEUTS SEG NFR BLD AUTO: 78 % (ref 43–75)
NRBC BLD AUTO-RTO: 0 /100 WBCS
PLATELET # BLD AUTO: 131 THOUSANDS/UL (ref 149–390)
PMV BLD AUTO: 11.8 FL (ref 8.9–12.7)
POTASSIUM SERPL-SCNC: 4 MMOL/L (ref 3.5–5.3)
PROT SERPL-MCNC: 5.8 G/DL (ref 6.4–8.4)
RBC # BLD AUTO: 4.77 MILLION/UL (ref 3.88–5.62)
SODIUM SERPL-SCNC: 144 MMOL/L (ref 135–147)
TRIGL SERPL-MCNC: 171 MG/DL
VIT B12 SERPL-MCNC: 468 PG/ML (ref 180–914)
WBC # BLD AUTO: 11.16 THOUSAND/UL (ref 4.31–10.16)

## 2023-09-02 PROCEDURE — 80053 COMPREHEN METABOLIC PANEL: CPT

## 2023-09-02 PROCEDURE — 85025 COMPLETE CBC W/AUTO DIFF WBC: CPT

## 2023-09-02 PROCEDURE — 36415 COLL VENOUS BLD VENIPUNCTURE: CPT

## 2023-09-02 PROCEDURE — 82607 VITAMIN B-12: CPT

## 2023-09-02 PROCEDURE — 83036 HEMOGLOBIN GLYCOSYLATED A1C: CPT

## 2023-09-02 PROCEDURE — 80061 LIPID PANEL: CPT

## 2023-09-04 DIAGNOSIS — D69.6 THROMBOCYTOPENIA (HCC): Primary | ICD-10-CM

## 2023-09-05 LAB
EST. AVERAGE GLUCOSE BLD GHB EST-MCNC: 140 MG/DL
HBA1C MFR BLD: 6.5 %

## 2023-09-05 NOTE — PROGRESS NOTES
Advanced Heart Failure/Pulmonary Hypertension Outpatient Note - Ami Eddy 77 y.o. male MRN: [id number]    @ Encounter: [id number]    Assessment:  77 y.o. male Hx per chart p/w HF fu. COPD and ADHF admit 8/2023, Tx steroids.     Primary cardiologist is Dr. Caroline Martinez, general cardiology  Chronic HFrEF, LVEF 35% (40-45% since at least 2022, NST 36% in 2021)  Hypotension, limits GDMT  CAD, prior MI in 1995 with PCI to RCA, stenting in 1999 and in 2001 involving the RCA then again in 2003 with stenting to LAD, nonobstructive disease on cardiac catheterization 2021  HTN in past  HLD  PVCs, 2022 Holter with 9.2% ventricular ectopy  Severe COPD on chronic 3 L nasal cannula oxygen, he says he is being considered for [place] valve but his EF is too low as of 9/2023  LAMBERTO on cpap      I have reviewed all pertinent patient data including but not limited to:    Lab Results   Component Value Date    CREATININE 1.05 09/02/2023     Lab Results   Component Value Date    K 4.0 09/02/2023     Lab Results   Component Value Date    HGBA1C 6.5 (H) 09/02/2023     Lab Results   Component Value Date    WCY4ZZCLEJQD 0.583 05/26/2023     Lab Results   Component Value Date    LDLCALC 52 09/02/2023     Lab Results   Component Value Date     (H) 08/13/2023      Lab Results   Component Value Date    NTBNP 239 11/15/2022      Home dry weight 180lbs    TODAY'S PLAN:     09/06/23  I am meeting patient for the first time today  Warm, euvolemic  On O2, severe COPD, he says he is being considered for [place] valve but his EF is too low as of 9/2023, needs to be >40% per pt  No new cardiac Sx recently, unchanged SOB and weight at home  Hypotension has been somewhat limiting for GDMT recently    gdmt below  BP historically limiting  Cautiously start jardiance and entresto, reduce kdur and lasix, fu BMP in 1 week  We discussed risks/benefits/red flags and when to call our clinic; patient purports understanding  Repeat echo 12/2023 on enhanced GDMT  Mild MR    No overt evidence worse ischemia now    If EF does not recover, consider repeat holter and reassess PVC burden    On ASA and statin    Neurohormonal Blockade/GDMT:  --Beta-Blocker: toprol xl 12.5 qd>consider increase in future though caution severe COPD  --ACEi, ARB, ARNi: not on  --MRA: aldactone 25 qd  --SGLT2i: jardiance 10 qd; discussed risks/benefits/red flags/when to call us; no overt contraindications  --Hydralazine/nitrates: none    --Diuretic: lasix 40 qd>20 qd, kdur bid>q48hr    Other HF pharmaco-invasive therapy (if applicable):   PPM,  ICD , CRT (if applicable): Interrogation:  Advanced Therapies (if applicable): --Inotrope:  --LVAD/Transplant Candidacy: unfortunately not a candidate if ever indicated    Studies:  I have reviewed all pertinent patient data/labs/imaging where available, including but not limited to the below studies. Selected results may be displayed here but comprehensive listing is omitted for note clarity and can be found in the epic chart. ECG. Echo. Stress. Cath. HPI:   77 y.o. male Hx per chart p/w HF fu. COPD and ADHF admit 8/2023, Tx steroids. No new CP/SOB/dizziness/palpitations/syncope. No new fatigue. No new unintentional weight changes. No new leg swelling, PND, pillow orthopnea. No new fevers, chills, cough, nausea, vomiting, diarrhea, dysuria. Interval History:   As noted in 'plan' section above and prior epic chart notes.     Past Medical History:   Diagnosis Date   • Abnormal ECG 2021 OR 2022   • Alcoholism ([address]) 1984    sober 45 years   • Arthritis 2008    beginning   • Bilateral carotid artery stenosis    • Callus    • Cancer (HCC)     skin   • Chronic diastolic heart failure (HCC)    • Chronic ischemic heart disease    • Chronic kidney disease     stage 3   • Colon polyp    • COPD (chronic obstructive pulmonary disease) (HCC)    • Coronary artery disease     hx stents, MI, PCI   • COVID 11/2021   • CPAP (continuous positive airway pressure) dependence    • Disease of thyroid gland 2017    nodules   • Emphysema of lung ([address]) 1995    started   • Hearing loss    • History of transfusion 1995   • Hyperlipidemia    • Hypertension    • Intracranial aneurysm 04/25/2023   • Lung nodule 2023    x rays   • MI (myocardial infarction) ([address]) 1995   • Myocardial infarction ([address]) 1995   • Pneumonia    • Pneumothorax 02/20/2023    collapsed lung   • Prostate cancer ([address])    • RSV (respiratory syncytial virus infection) 10/2022   • S/P carotid endarterectomy    • Shortness of breath     O2 2 l/nc PRN   • Sleep apnea    • Sleep apnea, obstructive    • Stented coronary artery      Patient Active Problem List    Diagnosis Date Noted   • Erectile dysfunction 08/16/2023   • Other disorders of refraction 08/16/2023   • Occlusion and stenosis of unspecified carotid artery 08/16/2023   • Dysphagia 05/04/2023   • Congestive heart failure with left ventricular systolic dysfunction (LVSD) ([address]) 04/28/2023   • Hypoxemia 04/28/2023   • Intracranial aneurysm 04/25/2023   • Allergic rhinitis, unspecified 04/17/2023   • Chronic kidney disease, stage 3a ([address]) 04/17/2023   • Generalized muscle weakness 04/17/2023   • Hypertensive heart and chronic kidney disease with heart failure and stage 1 through stage 4 chronic kidney disease, or unspecified chronic kidney disease ([address]) 04/17/2023   • Need for assistance with personal care 04/17/2023   • Other abnormalities of gait and mobility 04/17/2023   • Retention of urine, unspecified 04/17/2023   • Sudden idiopathic hearing loss, right ear 04/17/2023   • Acute and chronic respiratory failure with hypoxia ([address]) 04/17/2023   • Elevated troponin 04/04/2023   • Sepsis due to pneumonia ([address]) 04/03/2023   • Orthopnea 03/21/2023   • Lower extremity edema 03/21/2023   • Irregular heart rhythm 03/21/2023   • Leukocytosis 02/21/2023   • Left leg pain 01/25/2023   • Transient right leg weakness 01/25/2023   • Hypokalemia 01/11/2023   • Ambulatory dysfunction 01/09/2023   • COPD (chronic obstructive pulmonary disease) ([address]) 01/09/2023   • Sensorineural hearing loss, bilateral 10/19/2022   • Chronic respiratory failure with hypoxia ([address]) 10/15/2022   • Prostate cancer ([address]) 09/27/2022   • Elevated MCV 09/08/2022   • Cubital tunnel syndrome on left 07/22/2022   • Carpal tunnel syndrome on left 07/22/2022   • 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   • Urinary frequency 04/27/2022   • Incomplete bladder emptying 04/27/2022   • Chronic bilateral low back pain with right-sided sciatica 04/18/2022   • Mid back pain 04/18/2022   • Thoracic radiculopathy 04/18/2022   • Chronic pain syndrome 04/18/2022   • Hoarseness or changing voice 04/14/2022   • Chronic right-sided thoracic back pain 03/05/2022   • Primary insomnia 03/02/2022   • Right hip pain 01/20/2022   • Abnormal nuclear stress test 03/18/2021   • Costochondral chest pain 01/15/2021   • Chronic obstructive pulmonary disease with acute exacerbation ([address]) 01/11/2021   • Oxygen dependent 01/11/2021   • S/P carotid endarterectomy 01/11/2021   • Stented coronary artery 01/11/2021   • Vertigo 01/11/2021   • Anisometropia 01/11/2021   • Osteoarthritis of spinal facet joint 01/11/2021   • Chronic ischemic heart disease 01/11/2021   • Chronic diastolic (congestive) heart failure ([address]) 01/11/2021   • Diverticular disease of colon 01/11/2021   • Dry eye syndrome 01/11/2021   • GERD (gastroesophageal reflux disease) 01/11/2021   • Acute hearing loss, right 01/11/2021   • Elevated PSA 01/11/2021   • Hypoxia 01/11/2021   • Lens replaced by other means 01/11/2021   • Lumbar radiculopathy 01/11/2021   • Multinodular goiter 01/11/2021   • Nuclear senile cataract 01/11/2021   • Obesity 01/11/2021   • Occlusion and stenosis of carotid artery 01/11/2021   • Osteoarthritis of hip 01/11/2021   • Prediabetes 01/11/2021   • LAMBERTO on CPAP 01/11/2021   • Solitary pulmonary nodule 01/11/2021   • Thyroid nodule 01/11/2021   • [place] syndrome, 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   • Depression, recurrent (HCC)    • Hyperlipidemia    • Coronary artery disease involving native coronary artery of native heart without angina pectoris    • Left carotid artery occlusion    • Hypertension        ROS:  10 point ROS negative except as specified in HPI/interval history    Allergies   Allergen Reactions   • Lisinopril Swelling and Cough   • Tetanus Antitoxin Anaphylaxis   • Tetanus Toxoid Anaphylaxis and Swelling       Current Outpatient Medications   Medication Instructions   • aspirin 81 mg, Daily at bedtime   • azithromycin (ZITHROMAX) 500 mg, Oral, 3 times weekly, Monday, Wednesday, Friday   • budesonide (PULMICORT) 0.5 mg, Nebulization, 2 times daily, Rinse mouth after use. • dextromethorphan-guaiFENesin (ROBITUSSIN DM)  mg/5 mL syrup 5 mL, Oral, 3 times daily PRN   • Empagliflozin (JARDIANCE) 10 mg, Oral, Every morning   • famotidine (PEPCID) 20 mg tablet TAKE 1 TABLET BY MOUTH DAILY AT  BEDTIME   • formoterol (PERFOROMIST) 20 mcg, Nebulization, 2 times daily   • furosemide (LASIX) 20 mg, Oral, Daily   • gabapentin (NEURONTIN) 300 mg, Oral, Daily at bedtime   • HYDROcodone-acetaminophen (Norco) 5-325 mg per tablet 1 tablet, Oral, Every 6 hours PRN   • ipratropium-albuterol (DUO-NEB) 0.5-2.5 mg/3 mL nebulizer solution No dose, route, or frequency recorded. • methocarbamol (ROBAXIN) 500 mg, Oral, 3 times daily PRN   • metoprolol succinate (TOPROL-XL) 12.5 mg, Oral, Daily   • naloxone (NARCAN) 4 mg/0.1 mL nasal spray Administer 1 spray into a nostril. If no response after 2-3 minutes, give another dose in the other nostril using a new spray.    • omeprazole (PRILOSEC) 20 mg, Oral, Daily   • oxygen 2 L/min, Inhalation, Continuous, 2LPM at rest and 3-4 LPM with activity per D Cedric FANG notes   • potassium chloride (K-DUR,KLOR-CON) 20 mEq tablet 20 mEq, Oral, Every 48 hours   • rosuvastatin (CRESTOR) 40 MG tablet TAKE 1 TABLET DAILY   • spironolactone (ALDACTONE) 25 mg, Oral, Daily        Social History     Socioeconomic History   • Marital status: /Civil Union     Spouse name: Not on file   • Number of children: Not on file   • Years of education: Not on file   • Highest education level: Not on file   Occupational History   • Not on file   Tobacco Use   • Smoking status: Former     Packs/day: 2.00     Years: 35.00     Total pack years: 70.00     Types: Cigarettes     Start date: 1960     Quit date: 1995     Years since quittin.1   • Smokeless tobacco: Never   • Tobacco comments:     stopped smoking the day i had a heart attack   Vaping Use   • Vaping Use: Never used   Substance and Sexual Activity   • Alcohol use: Not Currently   • Drug use: Never   • Sexual activity: Not Currently     Partners: Female     Birth control/protection: None   Other Topics Concern   • Not on file   Social History Narrative   • Not on file     Social Determinants of Health     Financial Resource Strain: Unknown (2023)    Overall Financial Resource Strain (CARDIA)    • Difficulty of Paying Living Expenses: Patient refused   Food Insecurity: No Food Insecurity (2023)    Hunger Vital Sign    • Worried About Running Out of Food in the Last Year: Never true    • Ran Out of Food in the Last Year: Never true   Transportation Needs: Unknown (2023)    Audria Oz - Transportation    • Lack of Transportation (Medical): Patient refused    • Lack of Transportation (Non-Medical): Patient refused   Physical Activity: Not on file   Stress: Not on file   Social Connections: Not on file   Intimate Partner Violence: Not on file   Housing Stability: [address]  (2023)    Housing Stability Vital Sign    • Unable to Pay for Housing in the Last Year: No    • Number of Places Lived in the Last Year: 1    • Unstable Housing in the Last Year: No       Family History   Problem Relation Age of Onset   • Heart attack Mother    • Dementia Mother    • Heart failure Mother             • Heart disease Mother         heart attacks (2)   • No Known Problems Father        Physical Exam:  Vitals:    09/06/23 1423   BP: 118/68   BP Location: Left arm   Patient Position: Sitting   Cuff Size: Standard   Pulse: 78   SpO2: 95%   Weight: 82.8 kg (182 lb 8 oz)     Constitutional: NAD, non toxic  Ears/nose/mouth/throat: atraumatic  CV: RRR, nl S1S2, no murmurs/rubs/gallups, no JVD, no HJR  Resp: Decreased breath sounds BL  GI: Soft, NTND  MSK: no swollen joints in exposed areas  Extr: No edema, warm LE  Pysche: Normal affect  Neuro: appropriate in conversation  Skin: dry and intact in exposed areas    Labs & Results:    Lab Results   Component Value Date    SODIUM 144 09/02/2023    K 4.0 09/02/2023     09/02/2023    CO2 34 (H) 09/02/2023    BUN 16 09/02/2023    CREATININE 1.05 09/02/2023    GLUC 164 (H) 08/19/2023    CALCIUM 8.9 09/02/2023     Lab Results   Component Value Date    WBC 11.16 (H) 09/02/2023    HGB 14.7 09/02/2023    HCT 48.9 09/02/2023     (H) 09/02/2023     (L) 09/02/2023     Lab Results   Component Value Date     (H) 08/13/2023      Lab Results   Component Value Date    CHOLESTEROL 159 09/02/2023    CHOLESTEROL 132 01/27/2023    CHOLESTEROL 110 04/18/2022     Lab Results   Component Value Date    HDL 73 09/02/2023    HDL 69 01/27/2023    HDL 53 04/18/2022     Lab Results   Component Value Date    TRIG 171 (H) 09/02/2023    TRIG 90 01/27/2023    TRIG 62 04/18/2022     Lab Results   Component Value Date    [address] 02/22/2021       Counseling / Coordination of Care  Time spent today 27 minutes. Greater than 50% of total time was spent with the patient and / or family counseling and / or coordination of care. We discussed diagnoses, most recent studies, tests and any changes in treatment plan. Thank you for the opportunity to participate in the care of this patient.     Doristine Litten, MD  Attending Physician  Advanced Heart Failure and Transplant Cardiology  [facility]

## 2023-09-06 ENCOUNTER — CONSULT (OUTPATIENT)
Dept: CARDIOLOGY CLINIC | Facility: CLINIC | Age: 78
End: 2023-09-06
Payer: COMMERCIAL

## 2023-09-06 VITALS
WEIGHT: 182.5 LBS | BODY MASS INDEX: 27.75 KG/M2 | OXYGEN SATURATION: 95 % | DIASTOLIC BLOOD PRESSURE: 68 MMHG | SYSTOLIC BLOOD PRESSURE: 118 MMHG | HEART RATE: 78 BPM

## 2023-09-06 DIAGNOSIS — I50.20 HFREF (HEART FAILURE WITH REDUCED EJECTION FRACTION) (HCC): Primary | ICD-10-CM

## 2023-09-06 DIAGNOSIS — E87.6 HYPOKALEMIA: ICD-10-CM

## 2023-09-06 DIAGNOSIS — I35.0 AORTIC VALVE STENOSIS, ETIOLOGY OF CARDIAC VALVE DISEASE UNSPECIFIED: ICD-10-CM

## 2023-09-06 DIAGNOSIS — I25.9 CHRONIC ISCHEMIC HEART DISEASE: ICD-10-CM

## 2023-09-06 DIAGNOSIS — I25.10 CORONARY ARTERY DISEASE INVOLVING NATIVE CORONARY ARTERY OF NATIVE HEART WITHOUT ANGINA PECTORIS: Chronic | ICD-10-CM

## 2023-09-06 PROCEDURE — 99214 OFFICE O/P EST MOD 30 MIN: CPT | Performed by: STUDENT IN AN ORGANIZED HEALTH CARE EDUCATION/TRAINING PROGRAM

## 2023-09-06 RX ORDER — FUROSEMIDE 40 MG/1
20 TABLET ORAL DAILY
Qty: 90 TABLET | Refills: 2 | Status: SHIPPED | OUTPATIENT
Start: 2023-09-06

## 2023-09-06 RX ORDER — POTASSIUM CHLORIDE 20 MEQ/1
20 TABLET, EXTENDED RELEASE ORAL
Qty: 60 TABLET | Refills: 3 | Status: SHIPPED | OUTPATIENT
Start: 2023-09-06

## 2023-09-06 RX ORDER — SPIRONOLACTONE 25 MG/1
25 TABLET ORAL DAILY
Qty: 90 TABLET | Refills: 5 | Status: SHIPPED | OUTPATIENT
Start: 2023-09-06 | End: 2025-02-27

## 2023-09-07 ENCOUNTER — TRANSCRIBE ORDERS (OUTPATIENT)
Dept: RADIATION ONCOLOGY | Facility: HOSPITAL | Age: 78
End: 2023-09-07

## 2023-09-07 DIAGNOSIS — C61 PROSTATE CANCER (HCC): Primary | ICD-10-CM

## 2023-09-12 ENCOUNTER — APPOINTMENT (OUTPATIENT)
Dept: LAB | Facility: HOSPITAL | Age: 78
End: 2023-09-12
Payer: COMMERCIAL

## 2023-09-12 DIAGNOSIS — D69.6 THROMBOCYTOPENIA (HCC): ICD-10-CM

## 2023-09-12 DIAGNOSIS — C61 PROSTATE CANCER (HCC): ICD-10-CM

## 2023-09-12 LAB
ANION GAP SERPL CALCULATED.3IONS-SCNC: 10 MMOL/L
BASOPHILS # BLD AUTO: 0.04 THOUSANDS/ÂΜL (ref 0–0.1)
BASOPHILS NFR BLD AUTO: 1 % (ref 0–1)
BUN SERPL-MCNC: 18 MG/DL (ref 5–25)
CALCIUM SERPL-MCNC: 9.1 MG/DL (ref 8.4–10.2)
CHLORIDE SERPL-SCNC: 101 MMOL/L (ref 96–108)
CO2 SERPL-SCNC: 31 MMOL/L (ref 21–32)
CREAT SERPL-MCNC: 1.04 MG/DL (ref 0.6–1.3)
EOSINOPHIL # BLD AUTO: 0.21 THOUSAND/ÂΜL (ref 0–0.61)
EOSINOPHIL NFR BLD AUTO: 3 % (ref 0–6)
ERYTHROCYTE [DISTWIDTH] IN BLOOD BY AUTOMATED COUNT: 14.3 % (ref 11.6–15.1)
GFR SERPL CREATININE-BSD FRML MDRD: 67 ML/MIN/1.73SQ M
GLUCOSE P FAST SERPL-MCNC: 92 MG/DL (ref 65–99)
HCT VFR BLD AUTO: 46.5 % (ref 36.5–49.3)
HGB BLD-MCNC: 14.8 G/DL (ref 12–17)
IMM GRANULOCYTES # BLD AUTO: 0.03 THOUSAND/UL (ref 0–0.2)
IMM GRANULOCYTES NFR BLD AUTO: 0 % (ref 0–2)
LYMPHOCYTES # BLD AUTO: 0.74 THOUSANDS/ÂΜL (ref 0.6–4.47)
LYMPHOCYTES NFR BLD AUTO: 10 % (ref 14–44)
MCH RBC QN AUTO: 31.4 PG (ref 26.8–34.3)
MCHC RBC AUTO-ENTMCNC: 31.8 G/DL (ref 31.4–37.4)
MCV RBC AUTO: 99 FL (ref 82–98)
MONOCYTES # BLD AUTO: 0.85 THOUSAND/ÂΜL (ref 0.17–1.22)
MONOCYTES NFR BLD AUTO: 12 % (ref 4–12)
NEUTROPHILS # BLD AUTO: 5.37 THOUSANDS/ÂΜL (ref 1.85–7.62)
NEUTS SEG NFR BLD AUTO: 74 % (ref 43–75)
NRBC BLD AUTO-RTO: 0 /100 WBCS
PLATELET # BLD AUTO: 185 THOUSANDS/UL (ref 149–390)
PMV BLD AUTO: 11.2 FL (ref 8.9–12.7)
POTASSIUM SERPL-SCNC: 3.7 MMOL/L (ref 3.5–5.3)
PSA SERPL-MCNC: 0.25 NG/ML (ref 0–4)
RBC # BLD AUTO: 4.72 MILLION/UL (ref 3.88–5.62)
SODIUM SERPL-SCNC: 142 MMOL/L (ref 135–147)
WBC # BLD AUTO: 7.24 THOUSAND/UL (ref 4.31–10.16)

## 2023-09-12 PROCEDURE — 85025 COMPLETE CBC W/AUTO DIFF WBC: CPT

## 2023-09-12 PROCEDURE — 84153 ASSAY OF PSA TOTAL: CPT

## 2023-09-12 PROCEDURE — 80048 BASIC METABOLIC PNL TOTAL CA: CPT

## 2023-09-12 PROCEDURE — 36415 COLL VENOUS BLD VENIPUNCTURE: CPT

## 2023-09-13 ENCOUNTER — TELEPHONE (OUTPATIENT)
Dept: OTHER | Facility: HOSPITAL | Age: 78
End: 2023-09-13

## 2023-09-13 DIAGNOSIS — C61 PROSTATE CANCER (HCC): Primary | ICD-10-CM

## 2023-09-13 NOTE — TELEPHONE ENCOUNTER
[facility] pt with prostate cancer, decided on Lupron only. Please let him know there is excellent PSA response down to 0.25 after his first dose of Lupron.   I will see him with his next PSA and for Lupron injection in January as scheduled    Lab Results   Component Value Date    PSA 0.25 09/12/2023    PSA 5.53 (H) 08/09/2023    PSA 11.23 (H) 06/15/2023

## 2023-09-13 NOTE — TELEPHONE ENCOUNTER
Called and spoke with patients daughter Jean Irvin. Advised her of patients PSA result and plan to follow up as scheduled in January with another PSA and Lupron injection. Appointment confirmed.

## 2023-09-14 ENCOUNTER — CLINICAL SUPPORT (OUTPATIENT)
Dept: PULMONOLOGY | Facility: HOSPITAL | Age: 78
End: 2023-09-14
Attending: INTERNAL MEDICINE
Payer: COMMERCIAL

## 2023-09-14 DIAGNOSIS — J44.9 CHRONIC OBSTRUCTIVE PULMONARY DISEASE, UNSPECIFIED COPD TYPE (HCC): Primary | ICD-10-CM

## 2023-09-14 DIAGNOSIS — J44.1 COPD EXACERBATION (HCC): ICD-10-CM

## 2023-09-14 NOTE — PROGRESS NOTES
Pulmonary Rehabilitation Plan of Care   Initial Care Plan      Today's date: 2023   # of Exercise Sessions Completed: 1  Patient name: Venkat rByant      : 1945  Age: 77 y.o. MRN: [id number]  Referring Physician: Teagan Quiroz MD (Internal Med)  Pulmonologist: Gilberto Orta DO  Provider: [facility]  Clinician: Marija Shirley MS    Dx:  COPD, very severe  Ratio 44%  Date of onset: 2023 (Last hospitalized exacerbation)      SUMMARY OF PROGRESS:  Today is Shlomo's initial evaluation to begin Pulmonary Rehab for the diagnosis of COPD. Patient does have a PFT on file, revealing an FEV1/FVC ratio of 44% and an FEV1 of 1.06 L (38%). This is suggestive of severeobstruction. Since their diagnosis, the patient has been experiencing increased dyspnea, increased sitting time, decreased physical activity, increased fatigue and increased reliance on supplemental O2. They report dyspnea and fatigue when completing ADLs . The patient currently does not follow a formal exercise program at home. Pt reports the following physical limitations: decreased strength, fatigue, CHACON. Depression screening using the PHQ-9 interprets the patient's score of 5 5-9 = Mild Depression. Anxiety screening using the PORTILLO-7 interprets the patients score of 3  0-4  = Not anxious. When addressed, the patient admits to having depression/anxiety. Patient reports excellent social/emotional support from friends and family. Information to begin using [email or web address] freee was provided as well as contact information for counseling through [employer]. PHQ-9 score will be reassessed in 30 days. The patient is a former smoker (quit 28 years ago). He has abstained since quitting. Patient admits to 100% medication compliance. At rest, the patient rated dyspnea 1/10 with SpO2 97% on supplemental O2 3L/min via nasal canula. They will complete an initial 6MWT during his first exercise session. Telemetry revealed NSR w/ freq PVCs. Resting HR 98 and /60. Education on smoking cessation, oxygen use, breathing techniques, pulmonary anatomy, exercise for the pulmonary patient, healthy eating, stress, and relaxation will be provided. Patient goals include: increase strength, improve CHACON, improve LVEF to qualify for [place] valve procedure. Brendan Brock will attend 24-36 exercise sessions, 2-3x/wk for 12-18 weeks beginning 9/18/2023. Will progress patient as tolerated over the next 30 days. Due to Shlomo's known cardiac history and need for increased LVEF, he will also be monitored via telemetry. Initial evaluation telemetery revealed NSR w/ freq ectopy. Freq ectopy is noted in recent cardiology notes. His primary cardiologist is Leonardo Ocasio DO and advanced heart failure cardiologist is Elise Madden MD.      Medication compliance: Yes   Comments: Pt reports to be compliant with medications    Fall Risk: Low   Comments: Ambulates with a steady gait with no assist device    Smoking: Former user    RPD at Rest: 1/10  RPD with Exercise:  TBD/10    Assessment of progression of lung disease and functional status:  CAT: 19/40  Shortness of breath questionnaire: 47/120      EXERCISE ASSESSMENT and PLAN    Current Exercise Program in Rehab:       Frequency: 2-3 days/week   Supplement with home exercise 2+ days/wk as tolerated        Minutes: 30-35         METS: TBD based on initial 6MWT              SpO2: >88%              RPD: 4-6                      HR: 20-30 bpm above rest   RPE: 4-5         Modalities: UBE, NuStep, Recumbent bike and Room walking      Exercise Progression 30 Day Goals :    Frequency: 2-3 days/week    Supplement with home exercise 2+ days/wk as tolerated       Minutes: 40-45         METS: TBD (1-2 METs greater than initial)              SpO2: >88%              RPD: 4-6                      HR: 20-30 bpm above rest   RPE: 4-5        Modalities: UBE, NuStep, Recumbent bike and Room walking     Strength training:   Will be added following 2-3 weeks of monitored exercise sessions   Modalities: Leg Press, Chest Press and Calf Raises    Oxygen needs:   Rest:  supplemental O2 via nasal cannula @ 2L/min   Exercise/physical activity:  supplemental O2 via nasal cannula @ 3-4L/min   Sleep:   supplemental O2 via nasal cannula @ 2.5L/min  and CPAP/BiPap    Oxygen Goal: Maintain SpO2>88% during exercise    Home Exercise: none  Education: pursed lipped breathing, diaphragmatic breathing, fall prevention while using nasal canula tubing, relaxation breathing, home exercise guidelines, benefits of exercise for pulmonary disease, RPE scale and RPD scale    Goals: reduced score on  USCD Shortness of Breath Questionnaire, Improved 6MW distance by 10%, reduced dyspnea during exercise , improved exercise tolerance (max METs tolerated in pulmonary rehab), SpO2 >88% during exercise, reduced dependence on supplemental O2, reduced score on CAT, reduced number of COPD exacerbations and attend pulmonary rehab regularly  Progressing:  Reviewed Pt goals and determined plan of care, Will continue to educate and progress as tolerated. Plan: Titrate supplemental oxygen as needed to maintain SpO2>88% with exercise, learn to conserve energy with ADLs , practice diaphragmatic breathing and reduce time sitting at home    Readiness to change: Preparation:  (Getting ready to change)       NUTRITION ASSESSMENT AND PLAN    Weight control:    Starting weight: 182   Current weight:     Diabetes: N/A  A1c: 6.5    last measured: 9/2/2023    Lipid Panel: 9/2/2023  Tot: 159, Tri: 171, HDL: 73, LDL: 52    Goals: Increase water intake to reduce/thin mucus production Reduced SOB while eating Smaller more frequent meals Improve hydration  Education: heart healthy eating  low sodium diet  hydration  nutrition for  lipid management  Progressing:Reviewed Pt goals and determined plan of care, Will continue to educate and progress as tolerated.   Plan: Education class: Reading Food Labels and Education Class: Heart Healthy Eating  Readiness to change: Preparation:  (Getting ready to change)       PSYCHOSOCIAL ASSESSMENT AND PLAN    Emotional:  Depression assessment:  PHQ-9 = 5 5-9 = Mild Depression            Anxiety measure:  PORTILLO-7 = 3 0-4  = Not anxious  Self-reported stress level: 5   Social support: Very Good    Goals:  Reduce perceived stress to 1-3/10, improved [facility] QOL < 27, PHQ-9 - reduced severity by one level, Increased interest in doing things, improved sleep, Improved appetite and improved concentration  Education: signs/sxs of depression, benefits of a positive support system, stress management techniques, depression and CAD and benefits of mental health counseling    Progressing:Reviewed Pt goals and determined plan of care, Will continue to educate and progress as tolerated.   Plan: Class: Stress and Your Health and Class: Relaxation  Readiness to change: Preparation:  (Getting ready to change)       OTHER CORE COMPONENTS     Tobacco:   Social History     Tobacco Use   Smoking Status Former   • Packs/day: 2.00   • Years: 35.00   • Total pack years: 70.00   • Types: Cigarettes   • Start date: 1960   • Quit date: 1995   • Years since quittin.2   Smokeless Tobacco Never   Tobacco Comments    stopped smoking the day i had a heart attack       Tobacco Use Intervention: Referral to tobacco expert:   N/A: Pt has a remote history of smoking    Blood pressure:    Restin/60   Exercise: TBD   Recovery: TBD    Goals: consistent BP < 130/80, reduced dietary sodium <2300mg, moderate intensity exercise >150 mins/wk and medication compliance  Education:  pathophysiology of pulmonary disease, preventing infections, environmental triggers, nebulizer use, bronchodilators, bronchial hygiene, traveling with pulmonary disease, class: Pulmonary Anatomy and Physiology and class:  Pulmonary Medications  Progressing:Reviewed Pt goals and determined plan of care, Will continue to educate and progress as tolerated.   Plan: Class: Understanding Heart Disease and Class: Common Heart Medications  Readiness to change: Preparation:  (Getting ready to change)       PULMONARY REHAB ASSESSMENT    Today's date: 2023  Patient name: Erika Flaherty     :        MRN: [id number]  PCP: Franco Parrish DO  Referring Physician: Brad Lanes, MD  Pulmonologist: Mann Smith DO    Dx: COPD, severe  Ratio 44%  Date of onset: 2023 (Last hospitalized exacerbation)  Cultural needs: None    Weight:    Wt Readings from Last 1 Encounters:   23 82.8 kg (182 lb 8 oz)      Height:   Ht Readings from Last 1 Encounters:   23 5' 8" (1.727 m)     Medical History:   Past Medical History:   Diagnosis Date   • Abnormal ECG  OR    • Alcoholism ([address]) 1984    sober 45 years   • Arthritis     beginning   • Bilateral carotid artery stenosis    • Callus    • Cancer (HCC)     skin   • Chronic diastolic heart failure (HCC)    • Chronic ischemic heart disease    • Chronic kidney disease     stage 3   • Colon polyp    • COPD (chronic obstructive pulmonary disease) ([address])    • Coronary artery disease     hx stents, MI, PCI   • COVID 2021   • CPAP (continuous positive airway pressure) dependence    • Disease of thyroid gland 2017    nodules   • Emphysema of lung ([address])     started   • Hearing loss    • History of transfusion    • Hyperlipidemia    • Hypertension    • Intracranial aneurysm 2023   • Lung nodule     x rays   • MI (myocardial infarction) ([address])    • Myocardial infarction ([address])    • Pneumonia    • Pneumothorax 2023    collapsed lung   • Prostate cancer ([address])    • RSV (respiratory syncytial virus infection) 10/2022   • S/P carotid endarterectomy    • Shortness of breath     O2 2 l/nc PRN   • Sleep apnea    • Sleep apnea, obstructive    • Stented coronary artery          Physical Limitations: Decreased strength, fatigue, SOB    Fall Risk: Low   Comments: Ambulates with a steady gait with no assist device    Oxygen needs:   Rest:  supplemental O2 via nasal cannula @ 2L/min   Exercise/physical activity:  supplemental O2 via nasal cannula @ 3-4L/min   Sleep:   supplemental O2 via nasal cannula @ 2.5L/min  and CPAP/BiPap    Patient has Rx for supplemental O2:  yes    Rating of Perceived Dyspnea at rest:  1/10    Does Pt monitor home SpO2? yes   Average SpO2 at rest:  92-94%   Average SpO2 with ADLs/physical activity:  84-86%    History of Toxic Exposure:  Chemicals  Dust  Second hand smoke    Use of Rescue Inhaler: Pt states he rarely uses his rescue inhaler    Use of Nebulizer Treatments:  Yes:  6 times per day    Patient practices breathing techniques at home:  Not always, encouraged to use spirometer      Risk Factors   Cholesterol: Yes  Smoking: Former user  HTN: Yes  DM: No  Obesity: Yes   Inactivity: Yes  Stress:  perceived  stress: 5/10   Stressors: Current health condition   Goals for Stress Management: Practice Relaxation Techniques, Read and Improve Time Management    Family History:  Family History   Problem Relation Age of Onset   • Heart attack Mother    • Dementia Mother    • Heart failure Mother          2006   • Heart disease Mother         heart attacks (2)   • No Known Problems Father        Allergies: Lisinopril, Tetanus antitoxin, and Tetanus toxoid  ETOH:   Social History     Substance and Sexual Activity   Alcohol Use Not Currently         Current Medications:   Current Outpatient Medications   Medication Sig Dispense Refill   • aspirin 81 mg chewable tablet 81 mg daily at bedtime     • azithromycin (ZITHROMAX) 250 mg tablet Take 500 mg by mouth 3 (three) times a week Monday, Wednesday, Friday     • budesonide (PULMICORT) 0.5 mg/2 mL nebulizer solution Take 2 mL (0.5 mg total) by nebulization 2 (two) times a day Rinse mouth after use.  180 mL 0   • dextromethorphan-guaiFENesin (ROBITUSSIN DM)  mg/5 mL syrup Take 5 mL by mouth 3 (three) times a day as needed for cough 354 mL 0   • Empagliflozin (Jardiance) 10 MG TABS tablet Take 1 tablet (10 mg total) by mouth every morning 90 tablet 5   • famotidine (PEPCID) 20 mg tablet TAKE 1 TABLET BY MOUTH DAILY AT  BEDTIME 100 tablet 2   • formoterol (PERFOROMIST) 20 MCG/2ML nebulizer solution Take 2 mL (20 mcg total) by nebulization 2 (two) times a day 180 mL 5   • furosemide (LASIX) 40 mg tablet Take 0.5 tablets (20 mg total) by mouth daily 90 tablet 2   • gabapentin (NEURONTIN) 300 mg capsule Take 1 capsule (300 mg total) by mouth daily at bedtime     • HYDROcodone-acetaminophen (Norco) 5-325 mg per tablet Take 1 tablet by mouth every 6 (six) hours as needed for pain Max Daily Amount: 4 tablets 15 tablet 0   • ipratropium-albuterol (DUO-NEB) 0.5-2.5 mg/3 mL nebulizer solution      • methocarbamol (ROBAXIN) 500 mg tablet Take 1 tablet (500 mg total) by mouth 3 (three) times a day as needed for muscle spasms 30 tablet 0   • metoprolol succinate (TOPROL-XL) 25 mg 24 hr tablet Take 0.5 tablets (12.5 mg total) by mouth daily 45 tablet 3   • naloxone (NARCAN) 4 mg/0.1 mL nasal spray Administer 1 spray into a nostril. If no response after 2-3 minutes, give another dose in the other nostril using a new spray. 1 each 1   • omeprazole (PriLOSEC) 20 mg delayed release capsule Take 1 capsule (20 mg total) by mouth daily 28 capsule 0   • oxygen gas Inhale 2 L/min continuous 2LPM at rest and 3-4 LPM with activity per D Cedric FANG notes     • potassium chloride (K-DUR,KLOR-CON) 20 mEq tablet Take 1 tablet (20 mEq total) by mouth every other day 60 tablet 3   • rosuvastatin (CRESTOR) 40 MG tablet TAKE 1 TABLET DAILY (Patient taking differently: Take 40 mg by mouth daily) 100 tablet 3   • spironolactone (ALDACTONE) 25 mg tablet Take 1 tablet (25 mg total) by mouth daily 90 tablet 5     No current facility-administered medications for this visit.            Functional Status Prior to Diagnosis for Treatment Occupation: retired  Recreation: Spend time with family  ADL’s: No limitations  [place]: No limitations  Exercise: None  Other: None    Current Functional Status  Occupation: retired  Recreation: Spend time with family  ADL’s:Capable of performing light ADLs only  [place]: Capable of performing light ADLs only  Exercise: None  Other: None    Patient Specific Goals: Increase strength, improve SOB/CHACON, improve LVEF to become candidate for [place] valve    Short Term Program Goals: dietary modifications increased strength improved energy/stamina with ADLs exercise 120-150 mins/wk    Long Term Goals: increased maximal walking duration  Improved functional capacity based on initial fitness assessment  improved exercise tolerance  Improved Quality of Life - [facility] score reduced  decreased shortness of breath  reduced reliance on supplemental O2    Oxygen Goals: Maintain SpO2>88% titrating supplemental oxygen as needed     Ability to reach goals/rehabilitation potential:  Very Good     Projected return to function: 12 weeks  Objective tests: 6 MWT to be completed at initial session      Nutritional   Reviewed details of Rate your Plate. Discussed key elements of heart healthy eating. Reviewed patient goals for dietary modifications and their clinical implications. Reviewed most recent lipid profile. Goals for dietary modification: choose lean cuts of meat  eliminate processed meats  more meatless meals  increase whole grains  eliminate butter  low sodium  more nuts/seeds  heathier choices while dining out      Emotional/Social  Patient has a history of depression    SOCIAL SUPPORT NETWORK    Marital status:       Domestic Violence Screening: No    Comments: Patient requires skilled [place] to achieve goals and maximum functional capacity. [place] medically necessary to reduce exacerbations. Patient requires to increase LVEF to become a cadidate for [place] valves.  Has known cardiac history therefore telemetry will be monitored as well.

## 2023-09-18 ENCOUNTER — CLINICAL SUPPORT (OUTPATIENT)
Dept: PULMONOLOGY | Facility: HOSPITAL | Age: 78
End: 2023-09-18
Attending: INTERNAL MEDICINE
Payer: COMMERCIAL

## 2023-09-18 DIAGNOSIS — J44.9 CHRONIC OBSTRUCTIVE PULMONARY DISEASE, UNSPECIFIED COPD TYPE (HCC): ICD-10-CM

## 2023-09-18 PROCEDURE — 94626 PHY/QHP OP PULM RHB W/MNTR: CPT

## 2023-09-18 NOTE — PROGRESS NOTES
Patient completed their initial 6MWT during their first exercise session. The initial 6MWT, updated outcomes, and session details are attached to this addendum.

## 2023-09-19 ENCOUNTER — TELEPHONE (OUTPATIENT)
Dept: FAMILY MEDICINE CLINIC | Facility: CLINIC | Age: 78
End: 2023-09-19

## 2023-09-19 DIAGNOSIS — J44.1 CHRONIC OBSTRUCTIVE PULMONARY DISEASE WITH ACUTE EXACERBATION (HCC): Primary | ICD-10-CM

## 2023-09-19 RX ORDER — PREDNISONE 10 MG/1
TABLET ORAL
Qty: 30 TABLET | Refills: 0 | Status: SHIPPED | OUTPATIENT
Start: 2023-09-19 | End: 2023-10-01

## 2023-09-19 RX ORDER — DOXYCYCLINE HYCLATE 100 MG/1
100 CAPSULE ORAL EVERY 12 HOURS SCHEDULED
Qty: 14 CAPSULE | Refills: 0 | Status: SHIPPED | OUTPATIENT
Start: 2023-09-19 | End: 2023-09-27

## 2023-09-19 NOTE — TELEPHONE ENCOUNTER
Hi, I'm calling from my father Rhea Gee AM. This is his daughter Marycruz Schreiber, and I wanted. I talked to somebody about the fact that he's starting to cough. He has some mucus, he has chills and he's on and off. This has been going on for the last four or five days. He does not want to wind up in the hospital again with pneumonia. If you can call me back at [phone]. Thanks.
I called patients daughter to see how he is feeling. Advised to give her a shout later to check in and see what his symptoms are.
PT daughter made aware.
Regarding: PNEUMONIA  Contact: [phone]  ----- Message from Rakesh Mills DO sent at 9/18/2023  6:19 PM EDT -----  Please call in AM     ----- Message sent from Rakesh Mills DO to Sonya Mandel at 9/18/2023  6:18 PM -----   Brielle Bautista,     What kind of symptoms are you having- shortness of breath, cough, fevers, chills? Are you at your baseline oxygen requirement? Typically it's diagnosed with listening to your lungs and a chest XR. We will call you first thing in the AM to discuss further, if anything worsens later today or overnight though I would go right to the ED for evaluation. Thanks! Dr. Terry Davison       ----- Message -----       From:Seun Gonzalez       Sent:9/18/2023 12:43 PM EDT         To:Sarahy Rodriguez    How can I tell if I have another case of pneumonia? I have not  been feeling up to par the past 4 or 5 days and just a little more weak than usual.  Execertation is common on things I normally do without a problrem so I was thinking I may have another case of the viral pnemonia Is theere a way to check it ? Thanks for your review.   Sandy Samuels
STAT chest XR, antibiotics and prednisone sent
67.1

## 2023-09-20 ENCOUNTER — APPOINTMENT (OUTPATIENT)
Dept: LAB | Facility: HOSPITAL | Age: 78
End: 2023-09-20
Payer: COMMERCIAL

## 2023-09-20 ENCOUNTER — CLINICAL SUPPORT (OUTPATIENT)
Dept: PULMONOLOGY | Facility: HOSPITAL | Age: 78
End: 2023-09-20
Attending: INTERNAL MEDICINE
Payer: COMMERCIAL

## 2023-09-20 ENCOUNTER — HOSPITAL ENCOUNTER (OUTPATIENT)
Dept: RADIOLOGY | Facility: HOSPITAL | Age: 78
Discharge: HOME | End: 2023-09-20
Payer: COMMERCIAL

## 2023-09-20 DIAGNOSIS — J44.1 CHRONIC OBSTRUCTIVE PULMONARY DISEASE WITH ACUTE EXACERBATION (HCC): ICD-10-CM

## 2023-09-20 DIAGNOSIS — J44.9 CHRONIC OBSTRUCTIVE PULMONARY DISEASE, UNSPECIFIED COPD TYPE (HCC): ICD-10-CM

## 2023-09-20 PROCEDURE — 94626 PHY/QHP OP PULM RHB W/MNTR: CPT

## 2023-09-20 PROCEDURE — 71046 X-RAY EXAM CHEST 2 VIEWS: CPT

## 2023-09-22 ENCOUNTER — CLINICAL SUPPORT (OUTPATIENT)
Dept: PULMONOLOGY | Facility: HOSPITAL | Age: 78
End: 2023-09-22
Attending: INTERNAL MEDICINE
Payer: COMMERCIAL

## 2023-09-22 DIAGNOSIS — J44.9 CHRONIC OBSTRUCTIVE PULMONARY DISEASE, UNSPECIFIED COPD TYPE (HCC): ICD-10-CM

## 2023-09-22 PROCEDURE — 94626 PHY/QHP OP PULM RHB W/MNTR: CPT

## 2023-09-25 ENCOUNTER — CLINICAL SUPPORT (OUTPATIENT)
Dept: PULMONOLOGY | Facility: HOSPITAL | Age: 78
End: 2023-09-25
Attending: INTERNAL MEDICINE
Payer: COMMERCIAL

## 2023-09-25 DIAGNOSIS — J44.9 CHRONIC OBSTRUCTIVE PULMONARY DISEASE, UNSPECIFIED COPD TYPE (HCC): ICD-10-CM

## 2023-09-25 PROCEDURE — 94626 PHY/QHP OP PULM RHB W/MNTR: CPT

## 2023-09-26 NOTE — PROGRESS NOTES
Advanced Heart Failure/Pulmonary Hypertension Outpatient Note - Viet Villa 77 y.o. male MRN: [id number]    @ Encounter: [id number]    Assessment:  77 y.o. male Hx per chart p/w HF fu. COPD and ADHF admit 8/2023, Tx steroids.     Primary cardiologist is Dr. Sherrie Lr, general cardiology  Chronic HFrEF, LVEF 35% (40-45% since at least 2022, NST 36% in 2021)  Hypotension, limits GDMT  CAD, prior MI in 1995 with PCI to RCA, stenting in 1999 and in 2001 involving the RCA then again in 2003 with stenting to LAD, nonobstructive disease on cardiac catheterization 2021  HTN in past  HLD  PVCs, 2022 Holter with 9.2% ventricular ectopy  Severe COPD on chronic 3 L nasal cannula oxygen, he says he is being considered for [facility] valve but his EF is too low as of 9/2023  LAMBERTO on cpap      I have reviewed all pertinent patient data including but not limited to:    Lab Results   Component Value Date    CREATININE 1.04 09/12/2023     Lab Results   Component Value Date    K 3.7 09/12/2023     Lab Results   Component Value Date    HGBA1C 6.5 (H) 09/02/2023     Lab Results   Component Value Date    UDQ6GSDEAUSS 0.583 05/26/2023     Lab Results   Component Value Date    LDLCALC 52 09/02/2023     Lab Results   Component Value Date     (H) 08/13/2023      Lab Results   Component Value Date    NTBNP 239 11/15/2022      Home dry weight 180lbs    TODAY'S PLAN:     09/27/23  Warm, euvolemic  Home SBP 110s  No new cardiac complaints, feels generally well  At rehab, doing well he says  Labs after recent GDMT changes were acceptable    gdmt below  BP historically limiting though seems to be some room  Cautiously start entresto, fu BMP in 1 week  Cautiously up bblocker given severe COPD  He notes past cough to lisinopril (no swelling or SOB just cough - he is very specific about this), he says he tolerated past losartan without issue and was stopped for reasons he does not recall but had no medical issue with it he knows  Repeat echo 12/2023 on enhanced GDMT - ordered today  Note Mild MR    On ASA and statin    Key info from my prior notes:    No overt evidence worse ischemia now    If EF does not recover, consider repeat holter and reassess PVC burden    I am meeting patient for the first time today  Warm, euvolemic  On O2, severe COPD, he says he is being considered for [facility] valve but his EF is too low as of 9/2023, needs to be >40% per pt  No new cardiac Sx recently, unchanged SOB and weight at home  Hypotension has been somewhat limiting for GDMT recently    Neurohormonal Blockade/GDMT:  --Beta-Blocker: toprol xl 12.5 qd>25 qd, caution severe COPD  --ACEi, ARB, ARNi: entresto 24/26 bid  He notes past cough to lisinopril (no swelling or SOB just cough - he is very specific about this), he says he tolerated past losartan without issue and was stopped for reasons he does not recall but had no medical issue with it he knows  --MRA: aldactone 25 qd  --SGLT2i: jardiance 10 qd; discussed risks/benefits/red flags/when to call us; no overt contraindications  --Hydralazine/nitrates: none    --Diuretic: lasix 40 qd>20 qd, kdur bid>now kdur q48hr on new aldactone  May need further dose reduction in future now that advancing gdmt 2023    Other HF pharmaco-invasive therapy (if applicable):   PPM,  ICD , CRT (if applicable): Interrogation:  Advanced Therapies (if applicable): --Inotrope:  --LVAD/Transplant Candidacy: unfortunately not a candidate if ever indicated    Studies:  I have reviewed all pertinent patient data/labs/imaging where available, including but not limited to the below studies. Selected results may be displayed here but comprehensive listing is omitted for note clarity and can be found in the epic chart. ECG. Echo. Stress. Cath. HPI:   77 y.o. male Hx per chart p/w HF fu. COPD and ADHF admit 8/2023, Tx steroids. No new CP/SOB/dizziness/palpitations/syncope. No new fatigue. No new unintentional weight changes.   No new leg swelling, PND, pillow orthopnea. No new fevers, chills, cough, nausea, vomiting, diarrhea, dysuria. Interval History:   As noted in 'plan' section above and prior epic chart notes. No new CP/SOB/dizziness/palpitations/syncope. No new fatigue. No new unintentional weight changes. No new leg swelling, PND, pillow orthopnea. No new fevers, chills, cough, nausea, vomiting, diarrhea, dysuria.     Past Medical History:   Diagnosis Date   • Abnormal ECG 2021 OR 2022   • Alcoholism ([address]) 1984    sober 45 years   • Arthritis 2008    beginning   • Bilateral carotid artery stenosis    • Callus    • Cancer (HCC)     skin   • Chronic diastolic heart failure (HCC)    • Chronic ischemic heart disease    • Chronic kidney disease     stage 3   • Colon polyp    • COPD (chronic obstructive pulmonary disease) (HCC)    • Coronary artery disease     hx stents, MI, PCI   • COVID 11/2021   • CPAP (continuous positive airway pressure) dependence    • Disease of thyroid gland 2017    nodules   • Emphysema of lung ([address]) 1995    started   • Hearing loss    • History of transfusion 1995   • Hyperlipidemia    • Hypertension    • Intracranial aneurysm 04/25/2023   • Lung nodule 2023    x rays   • MI (myocardial infarction) ([address]) 1995   • Myocardial infarction ([address]) 1995   • Pneumonia    • Pneumothorax 02/20/2023    collapsed lung   • Prostate cancer ([address])    • RSV (respiratory syncytial virus infection) 10/2022   • S/P carotid endarterectomy    • Shortness of breath     O2 2 l/nc PRN   • Sleep apnea    • Sleep apnea, obstructive    • Stented coronary artery      Patient Active Problem List    Diagnosis Date Noted   • Erectile dysfunction 08/16/2023   • Other disorders of refraction 08/16/2023   • Occlusion and stenosis of unspecified carotid artery 08/16/2023   • Dysphagia 05/04/2023   • Congestive heart failure with left ventricular systolic dysfunction (LVSD) ([address]) 04/28/2023   • Hypoxemia 04/28/2023   • Intracranial aneurysm 04/25/2023   • Allergic rhinitis, unspecified 04/17/2023   • Chronic kidney disease, stage 3a ([address]) 04/17/2023   • Generalized muscle weakness 04/17/2023   • Hypertensive heart and chronic kidney disease with heart failure and stage 1 through stage 4 chronic kidney disease, or unspecified chronic kidney disease ([address]) 04/17/2023   • Need for assistance with personal care 04/17/2023   • Other abnormalities of gait and mobility 04/17/2023   • Retention of urine, unspecified 04/17/2023   • Sudden idiopathic hearing loss, right ear 04/17/2023   • Acute and chronic respiratory failure with hypoxia ([address]) 04/17/2023   • Elevated troponin 04/04/2023   • Sepsis due to pneumonia ([address]) 04/03/2023   • Orthopnea 03/21/2023   • Lower extremity edema 03/21/2023   • Irregular heart rhythm 03/21/2023   • Leukocytosis 02/21/2023   • Left leg pain 01/25/2023   • Transient right leg weakness 01/25/2023   • Hypokalemia 01/11/2023   • Ambulatory dysfunction 01/09/2023   • COPD (chronic obstructive pulmonary disease) ([address]) 01/09/2023   • Sensorineural hearing loss, bilateral 10/19/2022   • Chronic respiratory failure with hypoxia ([address]) 10/15/2022   • Prostate cancer ([address]) 09/27/2022   • Elevated MCV 09/08/2022   • Cubital tunnel syndrome on left 07/22/2022   • Carpal tunnel syndrome on left 07/22/2022   • 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   • Urinary frequency 04/27/2022   • Incomplete bladder emptying 04/27/2022   • Chronic bilateral low back pain with right-sided sciatica 04/18/2022   • Mid back pain 04/18/2022   • Thoracic radiculopathy 04/18/2022   • Chronic pain syndrome 04/18/2022   • Hoarseness or changing voice 04/14/2022   • Chronic right-sided thoracic back pain 03/05/2022   • Primary insomnia 03/02/2022   • Right hip pain 01/20/2022   • Abnormal nuclear stress test 03/18/2021   • Costochondral chest pain 01/15/2021   • Chronic obstructive pulmonary disease with acute exacerbation ([address]) 01/11/2021   • Oxygen dependent 01/11/2021   • S/P carotid endarterectomy 01/11/2021   • Stented coronary artery 01/11/2021   • Vertigo 01/11/2021   • Anisometropia 01/11/2021   • Osteoarthritis of spinal facet joint 01/11/2021   • Chronic ischemic heart disease 01/11/2021   • Chronic diastolic (congestive) heart failure ([address]) 01/11/2021   • Diverticular disease of colon 01/11/2021   • Dry eye syndrome 01/11/2021   • GERD (gastroesophageal reflux disease) 01/11/2021   • Acute hearing loss, right 01/11/2021   • Elevated PSA 01/11/2021   • Hypoxia 01/11/2021   • Lens replaced by other means 01/11/2021   • Lumbar radiculopathy 01/11/2021   • Multinodular goiter 01/11/2021   • Nuclear senile cataract 01/11/2021   • Obesity 01/11/2021   • Occlusion and stenosis of carotid artery 01/11/2021   • Osteoarthritis of hip 01/11/2021   • Prediabetes 01/11/2021   • LAMBERTO on CPAP 01/11/2021   • Solitary pulmonary nodule 01/11/2021   • Thyroid nodule 01/11/2021   • Guyon syndrome, 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   • Depression, recurrent ([address])    • Hyperlipidemia    • Coronary artery disease involving native coronary artery of native heart without angina pectoris    • Left carotid artery occlusion    • Hypertension        ROS:  10 point ROS negative except as specified in HPI/interval history    Allergies   Allergen Reactions   • Lisinopril Swelling and Cough   • Tetanus Antitoxin Anaphylaxis   • Tetanus Toxoid Anaphylaxis and Swelling       Current Outpatient Medications   Medication Instructions   • aspirin 81 mg, Daily at bedtime   • azithromycin (ZITHROMAX) 500 mg, Oral, 3 times weekly, Monday, Wednesday, Friday   • budesonide (PULMICORT) 0.5 mg, Nebulization, 2 times daily, Rinse mouth after use.    • dextromethorphan-guaiFENesin (ROBITUSSIN DM)  mg/5 mL syrup 5 mL, Oral, 3 times daily PRN   • doxycycline hyclate (VIBRAMYCIN) 100 mg, Oral, Every 12 hours scheduled   • Empagliflozin (JARDIANCE) 10 mg, Oral, Every morning   • famotidine (PEPCID) 20 mg tablet TAKE 1 TABLET BY MOUTH DAILY AT  BEDTIME   • formoterol (PERFOROMIST) 20 mcg, Nebulization, 2 times daily   • furosemide (LASIX) 20 mg, Oral, Daily   • gabapentin (NEURONTIN) 300 mg, Oral, Daily at bedtime   • HYDROcodone-acetaminophen (Norco) 5-325 mg per tablet 1 tablet, Oral, Every 6 hours PRN   • ipratropium-albuterol (DUO-NEB) 0.5-2.5 mg/3 mL nebulizer solution No dose, route, or frequency recorded. • methocarbamol (ROBAXIN) 500 mg, Oral, 3 times daily PRN   • metoprolol succinate (TOPROL-XL) 25 mg, Oral, Daily   • naloxone (NARCAN) 4 mg/0.1 mL nasal spray Administer 1 spray into a nostril. If no response after 2-3 minutes, give another dose in the other nostril using a new spray. • omeprazole (PRILOSEC) 20 mg, Oral, Daily   • oxygen 2 L/min, Inhalation, Continuous, 2LPM at rest and 3-4 LPM with activity per D Cedric FANG notes   • potassium chloride (K-DUR,KLOR-CON) 20 mEq tablet 20 mEq, Oral, Every 48 hours   • predniSONE 10 mg tablet Take 4 tablets (40 mg total) by mouth daily for 3 days, THEN 3 tablets (30 mg total) daily for 3 days, THEN 2 tablets (20 mg total) daily for 3 days, THEN 1 tablet (10 mg total) daily for 3 days.    • rosuvastatin (CRESTOR) 40 MG tablet TAKE 1 TABLET DAILY   • sacubitril-valsartan (Entresto) 24-26 MG TABS 1 tablet, Oral, 2 times daily   • spironolactone (ALDACTONE) 25 mg, Oral, Daily        Social History     Socioeconomic History   • Marital status: /Civil Union     Spouse name: Not on file   • Number of children: Not on file   • Years of education: Not on file   • Highest education level: Not on file   Occupational History   • Not on file   Tobacco Use   • Smoking status: Former     Packs/day: 2.00     Years: 35.00     Total pack years: 70.00     Types: Cigarettes     Start date: 1960     Quit date: 1995     Years since quittin.2   • Smokeless tobacco: Never   • Tobacco comments:     stopped smoking the day i had a heart attack   Vaping Use   • Vaping Use: Never used Substance and Sexual Activity   • Alcohol use: Not Currently   • Drug use: Never   • Sexual activity: Not Currently     Partners: Female     Birth control/protection: None   Other Topics Concern   • Not on file   Social History Narrative   • Not on file     Social Determinants of Health     Financial Resource Strain: Unknown (2023)    Overall Financial Resource Strain (CARDIA)    • Difficulty of Paying Living Expenses: Patient refused   Food Insecurity: No Food Insecurity (2023)    Hunger Vital Sign    • Worried About Running Out of Food in the Last Year: Never true    • Ran Out of Food in the Last Year: Never true   Transportation Needs: Unknown (2023)    [facility] [place] [facility]    • Lack of Transportation (Medical): Patient refused    • Lack of Transportation (Non-Medical): Patient refused   Physical Activity: Not on file   Stress: Not on file   Social Connections: Not on file   Intimate Partner Violence: Not on file   Housing Stability: [address]  (2023)    Housing Stability Vital Sign    • Unable to Pay for Housing in the Last Year: No    • Number of [address] in the Last Year: 1    • Unstable Housing in the Last Year: No       Family History   Problem Relation Age of Onset   • Heart attack Mother    • Dementia Mother    • Heart failure Mother          2006   • Heart disease Mother         heart attacks (2)   • No Known Problems Father        Physical Exam:  Vitals:    23 1422   BP: 116/70   BP Location: Left arm   Patient Position: Sitting   Cuff Size: Standard   Pulse: 101   SpO2: 93%   Weight: 82.6 kg (182 lb 3.2 oz)   Height: 5' 8" (1.727 m)     Constitutional: NAD, non toxic  Ears/nose/mouth/throat: atraumatic  CV: RRR, nl S1S2, no murmurs/rubs/gallups, no JVD, no HJR  Resp: Decreased breath sounds BL  GI: Soft, NTND  MSK: no swollen joints in exposed areas  Extr: trace LE edema, warm LE  Pysche: Normal affect  Neuro: appropriate in conversation  Skin: dry and intact in exposed areas    Labs & Results:    Lab Results   Component Value Date    SODIUM 142 09/12/2023    K 3.7 09/12/2023     09/12/2023    CO2 31 09/12/2023    BUN 18 09/12/2023    CREATININE 1.04 09/12/2023    GLUC 164 (H) 08/19/2023    CALCIUM 9.1 09/12/2023     Lab Results   Component Value Date    WBC 7.24 09/12/2023    HGB 14.8 09/12/2023    HCT 46.5 09/12/2023    MCV 99 (H) 09/12/2023     09/12/2023     Lab Results   Component Value Date     (H) 08/13/2023      Lab Results   Component Value Date    CHOLESTEROL 159 09/02/2023    CHOLESTEROL 132 01/27/2023    CHOLESTEROL 110 04/18/2022     Lab Results   Component Value Date    HDL 73 09/02/2023    HDL 69 01/27/2023    HDL 53 04/18/2022     Lab Results   Component Value Date    TRIG 171 (H) 09/02/2023    TRIG 90 01/27/2023    TRIG 62 04/18/2022     Lab Results   Component Value Date    [facility] 70 02/22/2021       Counseling / Coordination of Care  Time spent today 28 minutes. Greater than 50% of total time was spent with the patient and / or family counseling and / or coordination of care. We discussed diagnoses, most recent studies, tests and any changes in treatment plan. Thank you for the opportunity to participate in the care of this patient.     Elise Madden MD  Attending Physician  Advanced Heart Failure and Transplant Cardiology  [address]

## 2023-09-27 ENCOUNTER — APPOINTMENT (OUTPATIENT)
Dept: PULMONOLOGY | Facility: HOSPITAL | Age: 78
End: 2023-09-27
Attending: INTERNAL MEDICINE
Payer: COMMERCIAL

## 2023-09-27 ENCOUNTER — OFFICE VISIT (OUTPATIENT)
Dept: CARDIOLOGY CLINIC | Facility: CLINIC | Age: 78
End: 2023-09-27
Payer: COMMERCIAL

## 2023-09-27 VITALS
OXYGEN SATURATION: 93 % | DIASTOLIC BLOOD PRESSURE: 70 MMHG | BODY MASS INDEX: 27.61 KG/M2 | WEIGHT: 182.2 LBS | HEART RATE: 101 BPM | SYSTOLIC BLOOD PRESSURE: 116 MMHG | HEIGHT: 68 IN

## 2023-09-27 DIAGNOSIS — I35.0 AORTIC VALVE STENOSIS, ETIOLOGY OF CARDIAC VALVE DISEASE UNSPECIFIED: ICD-10-CM

## 2023-09-27 DIAGNOSIS — R07.9 CHEST PAIN, UNSPECIFIED TYPE: ICD-10-CM

## 2023-09-27 DIAGNOSIS — I25.10 CORONARY ARTERY DISEASE INVOLVING NATIVE CORONARY ARTERY OF NATIVE HEART, UNSPECIFIED WHETHER ANGINA PRESENT: ICD-10-CM

## 2023-09-27 DIAGNOSIS — E87.6 HYPOKALEMIA: ICD-10-CM

## 2023-09-27 DIAGNOSIS — I50.20 HFREF (HEART FAILURE WITH REDUCED EJECTION FRACTION) (HCC): Primary | ICD-10-CM

## 2023-09-27 PROCEDURE — 99214 OFFICE O/P EST MOD 30 MIN: CPT | Performed by: STUDENT IN AN ORGANIZED HEALTH CARE EDUCATION/TRAINING PROGRAM

## 2023-09-27 RX ORDER — POTASSIUM CHLORIDE 20 MEQ/1
20 TABLET, EXTENDED RELEASE ORAL
Qty: 45 TABLET | Refills: 5 | Status: SHIPPED | OUTPATIENT
Start: 2023-09-27 | End: 2025-03-20

## 2023-09-27 RX ORDER — SACUBITRIL AND VALSARTAN 24; 26 MG/1; MG/1
1 TABLET, FILM COATED ORAL 2 TIMES DAILY
Qty: 180 TABLET | Refills: 5 | Status: SHIPPED | OUTPATIENT
Start: 2023-09-27 | End: 2025-03-20

## 2023-09-27 RX ORDER — METOPROLOL SUCCINATE 25 MG/1
25 TABLET, EXTENDED RELEASE ORAL DAILY
Qty: 90 TABLET | Refills: 5 | Status: SHIPPED | OUTPATIENT
Start: 2023-09-27 | End: 2025-03-20

## 2023-09-29 ENCOUNTER — CLINICAL SUPPORT (OUTPATIENT)
Dept: PULMONOLOGY | Facility: HOSPITAL | Age: 78
End: 2023-09-29
Attending: INTERNAL MEDICINE
Payer: COMMERCIAL

## 2023-09-29 ENCOUNTER — OFFICE VISIT (OUTPATIENT)
Dept: CARDIOLOGY CLINIC | Facility: CLINIC | Age: 78
End: 2023-09-29
Payer: COMMERCIAL

## 2023-09-29 VITALS
HEART RATE: 68 BPM | WEIGHT: 181 LBS | BODY MASS INDEX: 27.43 KG/M2 | SYSTOLIC BLOOD PRESSURE: 92 MMHG | HEIGHT: 68 IN | DIASTOLIC BLOOD PRESSURE: 68 MMHG

## 2023-09-29 DIAGNOSIS — I50.20 HFREF (HEART FAILURE WITH REDUCED EJECTION FRACTION) (HCC): Primary | ICD-10-CM

## 2023-09-29 DIAGNOSIS — I25.10 CORONARY ARTERY DISEASE INVOLVING NATIVE CORONARY ARTERY OF NATIVE HEART WITHOUT ANGINA PECTORIS: ICD-10-CM

## 2023-09-29 DIAGNOSIS — I10 PRIMARY HYPERTENSION: ICD-10-CM

## 2023-09-29 DIAGNOSIS — I49.3 PVC (PREMATURE VENTRICULAR CONTRACTION): ICD-10-CM

## 2023-09-29 DIAGNOSIS — J44.9 CHRONIC OBSTRUCTIVE PULMONARY DISEASE, UNSPECIFIED COPD TYPE (HCC): Primary | ICD-10-CM

## 2023-09-29 PROCEDURE — 99214 OFFICE O/P EST MOD 30 MIN: CPT | Performed by: INTERNAL MEDICINE

## 2023-09-29 PROCEDURE — 94625 PHY/QHP OP PULM RHB W/O MNTR: CPT

## 2023-09-29 RX ORDER — MELATONIN
1000 DAILY
COMMUNITY

## 2023-09-29 RX ORDER — SPIRONOLACTONE 25 MG/1
12.5 TABLET ORAL DAILY
Qty: 45 TABLET | Refills: 3 | Status: SHIPPED | OUTPATIENT
Start: 2023-09-29 | End: 2023-10-04 | Stop reason: ALTCHOICE

## 2023-09-29 NOTE — PROGRESS NOTES
Cardiology Follow Up    Ge Platt  [id number]  1945  [facility] [address] [address]      1. HFrEF (heart failure with reduced ejection fraction) ([facility])  spironolactone (ALDACTONE) 25 mg tablet      2. Coronary artery disease involving native coronary artery of native heart without angina pectoris        3. Primary hypertension        4. PVC (premature ventricular contraction)            Discussion/Summary:  1. Heart failure with reduced ejection fraction  2. Coronary artery disease with nonobstructive disease on catheterization 2021  3. Hypertension  4. Hyperlipidemia  5. PVCs asymptomatic  6.   COPD on chronic 4 L nasal cannula oxygen    -Transthoracic echocardiogram 8/18/2023 showing left ventricular systolic function moderately reduced estimated LVEF 35% with grade 1 diastolic dysfunction, mildly dilated and mildly reduced right ventricular systolic function with mild tricuspid regurgitation and aortic sclerosis  -Patient blood pressure soft in office today and recent readings over past couple of days somewhat low in the 86V- mmHg systolic range  -In that setting along with symptoms of dizziness particularly change in position will reduce patient's spironolactone from 25 mg to 12.5 mg daily  -Patient will continue aspirin 81 mg daily, Jardiance 10 mg daily, furosemide 20 mg daily, metoprolol succinate 25 mg daily, potassium 20 mEq every 48 hours, Crestor 40 mg daily, Entresto 24-26 mg twice daily  -I will see patient in 3 months or sooner if necessary  -Patient counseled on dietary and lifestyle modifications including sodium and fluid restricted diet  -Patient counseled if he were to have any warning or alarm type symptoms he is to seek emergency medical care immediately.  -We will follow-up repeat echocardiogram        History of Present Illness:  -Patient is a 65-year-old male with hypertension, hyperlipidemia, obesity, history of COPD on chronic nasal cannula oxygen at 3 L along with chronic antibiotic use, obstructive sleep apnea compliant with CPAP therapy, history of coronary artery disease with prior MI in 1995 with PCI to RCA, stenting in 1999 and 2000 involving the RCA then again in 2003 with stenting to LAD with cardiac catheterization in March 2021 showing nonobstructive CAD with no need for revascularization at that time along with asymptomatic PVCs hospitalized multiple times over the past year for issues with volume overload and COPD along with pneumonias and pneumonitis requiring IV steroid therapy who was even seen and evaluated by advanced heart failure team with up titration and guideline directed medical therapy and presents to the office today for scheduled follow-up.  -Patient notes overall doing reasonably well with ability to increase his physical capacity requiring nasal cannula oxygen however over the last several days he has been lightheaded and noticed his home blood pressure readings somewhat lower than they were previous.  -Currently in the office today notes some slight lightheadedness but no significant issue and denies any chest pain, palpitations, loss of consciousness. States shortness of breath at her chronic baseline if not somewhat improved and notes lower extremity edema stable with use of compression stockings and dietary lifestyle modifications along with diuretic therapy notes good urine output.     Patient Active Problem List   Diagnosis   • Hyperlipidemia   • Coronary artery disease involving native coronary artery of native heart without angina pectoris   • Left carotid artery occlusion   • Hypertension   • Chronic obstructive pulmonary disease with acute exacerbation (HCC)   • Oxygen dependent   • Depression, recurrent (HCC)   • S/P carotid endarterectomy   • Stented coronary artery   • Vertigo • Anisometropia   • Osteoarthritis of spinal facet joint   • Chronic ischemic heart disease   • Chronic diastolic (congestive) heart failure ([facility])   • Diverticular disease of colon   • Dry eye syndrome   • GERD (gastroesophageal reflux disease)   • Acute hearing loss, right   • Elevated PSA   • Hypoxia   • Lens replaced by other means   • Lumbar radiculopathy   • Multinodular goiter   • Nuclear senile cataract   • Obesity   • Occlusion and stenosis of carotid artery   • Osteoarthritis of hip   • Prediabetes   • LAMBERTO on CPAP   • Solitary pulmonary nodule   • Thyroid nodule   • Guyon syndrome, left   • Costochondral chest pain   • Abnormal nuclear stress test   • Right hip pain   • Primary insomnia   • Chronic right-sided thoracic back pain   • Hoarseness or changing voice   • Chronic bilateral low back pain with right-sided sciatica   • Mid back pain   • Thoracic radiculopathy   • Chronic pain syndrome   • Urinary frequency   • Incomplete bladder emptying   • Intercostal neuralgia   • Cubital tunnel syndrome on left   • Carpal tunnel syndrome on left   • Elevated MCV   • Prostate cancer ([facility])   • Chronic respiratory failure with hypoxia ([facility])   • Sensorineural hearing loss, bilateral   • Ambulatory dysfunction   • COPD (chronic obstructive pulmonary disease) ([facility])   • Hypokalemia   • Left leg pain   • Transient right leg weakness   • Leukocytosis   • Orthopnea   • Lower extremity edema   • Irregular heart rhythm   • Sepsis due to pneumonia ([facility])   • Elevated troponin   • Intracranial aneurysm   • Congestive heart failure with left ventricular systolic dysfunction (LVSD) ([facility])   • Hypoxemia   • Dysphagia   • Erectile dysfunction   • Allergic rhinitis, unspecified   • Chronic kidney disease, stage 3a ([facility])   • Generalized muscle weakness   • Hypertensive heart and chronic kidney disease with heart failure and stage 1 through stage 4 chronic kidney disease, or unspecified chronic kidney disease ([address])   • Need for assistance with personal care   • Other abnormalities of gait and mobility   • Retention of urine, unspecified   • Sudden idiopathic hearing loss, right ear   • Acute and chronic respiratory failure with hypoxia (HCC)   • Other disorders of refraction   • Occlusion and stenosis of unspecified carotid artery     Past Medical History:   Diagnosis Date   • Abnormal ECG  OR    • Alcoholism ([address]) 1984    sober 38 years   • Arthritis 2008    beginning   • Bilateral carotid artery stenosis    • Callus    • Cancer (HCC)     skin   • Chronic diastolic heart failure (HCC)    • Chronic ischemic heart disease    • Chronic kidney disease     stage 3   • Colon polyp    • COPD (chronic obstructive pulmonary disease) (HCC)    • Coronary artery disease     hx stents, MI, PCI   • COVID 2021   • CPAP (continuous positive airway pressure) dependence    • Disease of thyroid gland 2017    nodules   • Emphysema of lung ([address])     started   • Hearing loss    • History of transfusion    • Hyperlipidemia    • Hypertension    • Intracranial aneurysm 2023   • Lung nodule     x rays   • MI (myocardial infarction) ([address])    • Myocardial infarction ([address])    • Pneumonia    • Pneumothorax 2023    collapsed lung   • Prostate cancer ([address])    • RSV (respiratory syncytial virus infection) 10/2022   • S/P carotid endarterectomy    • Shortness of breath     O2 2 l/nc PRN   • Sleep apnea    • Sleep apnea, obstructive    • Stented coronary artery      Social History     Socioeconomic History   • Marital status: /Civil Union     Spouse name: Not on file   • Number of children: Not on file   • Years of education: Not on file   • Highest education level: Not on file   Occupational History   • Not on file   Tobacco Use   • Smoking status: Former     Packs/day: 2.00     Years: 35.00     Total pack years: 70.00     Types: Cigarettes     Start date: 1960     Quit date: 1995     Years since quittin.2   • Smokeless tobacco: Never   • Tobacco comments:     stopped smoking the day i had a heart attack   Vaping Use   • Vaping Use: Never used   Substance and Sexual Activity   • Alcohol use: Not Currently   • Drug use: Never   • Sexual activity: Not Currently     Partners: Female     Birth control/protection: None   Other Topics Concern   • Not on file   Social History Narrative   • Not on file     Social Determinants of Health     Financial Resource Strain: Unknown (2023)    Overall Financial Resource Strain (CARDIA)    • Difficulty of Paying Living Expenses: Patient refused   Food Insecurity: No Food Insecurity (2023)    Hunger Vital Sign    • Worried About Running Out of Food in the Last Year: Never true    • Ran Out of Food in the Last Year: Never true   Transportation Needs: Unknown (2023)    PRAPARE - Transportation    • Lack of Transportation (Medical): Patient refused    • Lack of Transportation (Non-Medical): Patient refused   Physical Activity: Not on file   Stress: Not on file   Social Connections: Not on file   Intimate Partner Violence: Not on file   Housing Stability: [address]  (2023)    Housing Stability Vital Sign    • Unable to Pay for Housing in the Last Year: No    • Number of Places Lived in the Last Year: 1    • Unstable Housing in the Last Year: No      Family History   Problem Relation Age of Onset   • Heart attack Mother    • Dementia Mother    • Heart failure Mother             • Heart disease Mother         heart attacks (2)   • No Known Problems Father      Past Surgical History:   Procedure Laterality Date   • APPENDECTOMY     • CARDIAC CATHETERIZATION  2021    left main with no significant disease, proximal LAD with 10% stenosis at the site of prior stent, left circumflex artery with minimal luminal irregularities mid RCA with 50% stenosis at site of prior stent and PL segment with distal disease supplied by collaterals from the distal circumflex with no significant CAD requiring revascularization at that time.    • CATARACT EXTRACTION, BILATERAL Bilateral    • COLONOSCOPY     • CORONARY ANGIOPLASTY WITH STENT PLACEMENT  1999    RCA   • CORONARY ANGIOPLASTY WITH STENT PLACEMENT  2001    RCA   • CORONARY ANGIOPLASTY WITH STENT PLACEMENT  2003    LAD   • EYE SURGERY     • SD BX PROSTATE STRTCTC SATURATION SAMPLING IMG GID N/A 09/13/2022    Procedure: TRANSPERINEAL MRI FUSION  BIOPSY PROSTATE;  Surgeon: Baldomero Bah MD;  Location: [facility];  Service: Urology   • SD NEUROPLASTY &/TRANSPOS MEDIAN NRV CARPAL Sree Hose Left 07/22/2022    Procedure: RELEASE CARPAL TUNNEL;  Surgeon: Lynford Holstein, MD;  Location: [facility];  Service: Orthopedics   • SD NEUROPLASTY &/TRANSPOSITION ULNAR NERVE ELBOW Left 07/22/2022    Procedure: RELEASE CUBITAL TUNNEL;  Surgeon: Lynford Holstein, MD;  Location: [facility];  Service: Orthopedics   • SD NEUROPLASTY &/TRANSPOSITION ULNAR NERVE WRIST Left 07/22/2022    Procedure: Freddrick Aas RELEASE;  Surgeon: Lynford Holstein, MD;  Location: [facility];  Service: Orthopedics   • SKIN CANCER EXCISION  2012    chin-per pt, basal cell  also right ankle   • TONSILLECTOMY  no       Current Outpatient Medications:   •  aspirin 81 mg chewable tablet, 81 mg daily at bedtime, Disp: , Rfl:   •  azithromycin (ZITHROMAX) 250 mg tablet, Take 500 mg by mouth 3 (three) times a week Monday, Wednesday, Friday, Disp: , Rfl:   •  budesonide (PULMICORT) 0.5 mg/2 mL nebulizer solution, Take 2 mL (0.5 mg total) by nebulization 2 (two) times a day Rinse mouth after use., Disp: 180 mL, Rfl: 0  •  cholecalciferol (VITAMIN D3) 1,000 units tablet, Take 1,000 Units by mouth daily, Disp: , Rfl:   •  dextromethorphan-guaiFENesin (ROBITUSSIN DM)  mg/5 mL syrup, Take 5 mL by mouth 3 (three) times a day as needed for cough, Disp: 354 mL, Rfl: 0  •  Empagliflozin (Jardiance) 10 MG TABS tablet, Take 1 tablet (10 mg total) by mouth every morning, Disp: 90 tablet, Rfl: 5  •  famotidine (PEPCID) 20 mg tablet, TAKE 1 TABLET BY MOUTH DAILY AT  BEDTIME, Disp: 100 tablet, Rfl: 2  •  formoterol (PERFOROMIST) 20 MCG/2ML nebulizer solution, Take 2 mL (20 mcg total) by nebulization 2 (two) times a day, Disp: 180 mL, Rfl: 5  •  furosemide (LASIX) 40 mg tablet, Take 0.5 tablets (20 mg total) by mouth daily, Disp: 90 tablet, Rfl: 2  •  gabapentin (NEURONTIN) 300 mg capsule, Take 1 capsule (300 mg total) by mouth daily at bedtime, Disp: , Rfl:   •  HYDROcodone-acetaminophen (Norco) 5-325 mg per tablet, Take 1 tablet by mouth every 6 (six) hours as needed for pain Max Daily Amount: 4 tablets, Disp: 15 tablet, Rfl: 0  •  ipratropium-albuterol (DUO-NEB) 0.5-2.5 mg/3 mL nebulizer solution, , Disp: , Rfl:   •  methocarbamol (ROBAXIN) 500 mg tablet, Take 1 tablet (500 mg total) by mouth 3 (three) times a day as needed for muscle spasms, Disp: 30 tablet, Rfl: 0  •  metoprolol succinate (TOPROL-XL) 25 mg 24 hr tablet, Take 1 tablet (25 mg total) by mouth daily, Disp: 90 tablet, Rfl: 5  •  naloxone (NARCAN) 4 mg/0.1 mL nasal spray, Administer 1 spray into a nostril. If no response after 2-3 minutes, give another dose in the other nostril using a new spray., Disp: 1 each, Rfl: 1  •  omeprazole (PriLOSEC) 20 mg delayed release capsule, Take 1 capsule (20 mg total) by mouth daily, Disp: 28 capsule, Rfl: 0  •  oxygen gas, Inhale 2 L/min continuous 2LPM at rest and 3-4 LPM with activity per D Cedric FANG notes, Disp: , Rfl:   •  potassium chloride (K-DUR,KLOR-CON) 20 mEq tablet, Take 1 tablet (20 mEq total) by mouth every other day, Disp: 45 tablet, Rfl: 5  •  predniSONE 10 mg tablet, Take 4 tablets (40 mg total) by mouth daily for 3 days, THEN 3 tablets (30 mg total) daily for 3 days, THEN 2 tablets (20 mg total) daily for 3 days, THEN 1 tablet (10 mg total) daily for 3 days. , Disp: 30 tablet, Rfl: 0  •  rosuvastatin (CRESTOR) 40 MG tablet, TAKE 1 TABLET DAILY (Patient taking differently: Take 40 mg by mouth daily), Disp: 100 tablet, Rfl: 3  •  sacubitril-valsartan (Entresto) 24-26 MG TABS, Take 1 tablet by mouth 2 (two) times a day, Disp: 180 tablet, Rfl: 5  •  spironolactone (ALDACTONE) 25 mg tablet, Take 0.5 tablets (12.5 mg total) by mouth daily, Disp: 45 tablet, Rfl: 3  Allergies   Allergen Reactions   • Lisinopril Swelling and Cough   • Tetanus Antitoxin Anaphylaxis   • Tetanus Toxoid Anaphylaxis and Swelling         Labs:  Appointment on 09/12/2023   Component Date Value   • PSA, Diagnostic 09/12/2023 0.25    • WBC 09/12/2023 7.24    • RBC 09/12/2023 4.72    • Hemoglobin 09/12/2023 14.8    • Hematocrit 09/12/2023 46.5    • MCV 09/12/2023 99 (H)    • MCH 09/12/2023 31.4    • MCHC 09/12/2023 31.8    • RDW 09/12/2023 14.3    • MPV 09/12/2023 11.2    • Platelets 97/52/4687 185    • nRBC 09/12/2023 0    • Neutrophils Relative 09/12/2023 74    • Immat GRANS % 09/12/2023 0    • Lymphocytes Relative 09/12/2023 10 (L)    • Monocytes Relative 09/12/2023 12    • Eosinophils Relative 09/12/2023 3    • Basophils Relative 09/12/2023 1    • Neutrophils Absolute 09/12/2023 5.37    • Immature Grans Absolute 09/12/2023 0.03    • Lymphocytes Absolute 09/12/2023 0.74    • Monocytes Absolute 09/12/2023 0.85    • Eosinophils Absolute 09/12/2023 0.21    • Basophils Absolute 09/12/2023 0.04    • Sodium 09/12/2023 142    • Potassium 09/12/2023 3.7    • Chloride 09/12/2023 101    • CO2 09/12/2023 31    • ANION GAP 09/12/2023 10    • BUN 09/12/2023 18    • Creatinine 09/12/2023 1.04    • Glucose, Fasting 09/12/2023 92    • Calcium 09/12/2023 9.1    • eGFR 09/12/2023 67    Appointment on 09/02/2023   Component Date Value   • Sodium 09/02/2023 144    • Potassium 09/02/2023 4.0    • Chloride 09/02/2023 102    • CO2 09/02/2023 34 (H)    • ANION GAP 09/02/2023 8    • BUN 09/02/2023 16    • Creatinine 09/02/2023 1.05    • Glucose, Fasting 09/02/2023 80    • Calcium 09/02/2023 8.9    • AST 09/02/2023 27    • ALT 09/02/2023 36    • Alkaline Phosphatase 09/02/2023 81    • Total Protein 09/02/2023 5.8 (L)    • Albumin 09/02/2023 3.8    • Total Bilirubin 09/02/2023 0.54    • eGFR 09/02/2023 67    • Vitamin B-12 09/02/2023 468    • WBC 09/02/2023 11.16 (H)    • RBC 09/02/2023 4.77    • Hemoglobin 09/02/2023 14.7    • Hematocrit 09/02/2023 48.9    • MCV 09/02/2023 103 (H)    • MCH 09/02/2023 30.8    • MCHC 09/02/2023 30.1 (L)    • RDW 09/02/2023 14.4    • MPV 09/02/2023 11.8    • Platelets 34/63/0140 131 (L)    • nRBC 09/02/2023 0    • Neutrophils Relative 09/02/2023 78 (H)    • Immat GRANS % 09/02/2023 1    • Lymphocytes Relative 09/02/2023 11 (L)    • Monocytes Relative 09/02/2023 8    • Eosinophils Relative 09/02/2023 2    • Basophils Relative 09/02/2023 0    • Neutrophils Absolute 09/02/2023 8.71 (H)    • Immature Grans Absolute 09/02/2023 0.07    • Lymphocytes Absolute 09/02/2023 1.19    • Monocytes Absolute 09/02/2023 0.92    • Eosinophils Absolute 09/02/2023 0.24    • Basophils Absolute 09/02/2023 0.03    • Cholesterol 09/02/2023 159    • Triglycerides 09/02/2023 171 (H)    • HDL, Direct 09/02/2023 73    • LDL Calculated 09/02/2023 52    • Hemoglobin A1C 09/02/2023 6.5 (H)    • EAG 09/02/2023 140    No results displayed because visit has over 200 results.       Admission on 08/13/2023, Discharged on 08/13/2023   Component Date Value   • WBC 08/13/2023 14.55 (H)    • RBC 08/13/2023 4.33    • Hemoglobin 08/13/2023 13.3    • Hematocrit 08/13/2023 44.0    • MCV 08/13/2023 102 (H)    • MCH 08/13/2023 30.7    • MCHC 08/13/2023 30.2 (L)    • RDW 08/13/2023 13.9    • MPV 08/13/2023 10.8    • Platelets 98/35/3063 216    • nRBC 08/13/2023 0    • Neutrophils Relative 08/13/2023 89 (H)    • Immat GRANS % 08/13/2023 1    • Lymphocytes Relative 08/13/2023 4 (L)    • Monocytes Relative 08/13/2023 6    • Eosinophils Relative 08/13/2023 0    • Basophils Relative 08/13/2023 0    • Neutrophils Absolute 08/13/2023 13.02 (H)    • Immature Grans Absolute 08/13/2023 0.07 • Lymphocytes Absolute 08/13/2023 0.55 (L)    • Monocytes Absolute 08/13/2023 0.88    • Eosinophils Absolute 08/13/2023 0.01    • Basophils Absolute 08/13/2023 0.02    • Sodium 08/13/2023 139    • Potassium 08/13/2023 4.3    • Chloride 08/13/2023 100    • CO2 08/13/2023 31    • ANION GAP 08/13/2023 8    • BUN 08/13/2023 30 (H)    • Creatinine 08/13/2023 1.18    • Glucose 08/13/2023 134    • Calcium 08/13/2023 8.8    • AST 08/13/2023 23    • ALT 08/13/2023 20    • Alkaline Phosphatase 08/13/2023 55    • Total Protein 08/13/2023 5.8 (L)    • Albumin 08/13/2023 4.0    • Total Bilirubin 08/13/2023 0.42    • eGFR 08/13/2023 58    • hs TnI 0hr 08/13/2023 39    • Ventricular Rate 08/13/2023 104    • Atrial Rate 08/13/2023 104    • AR Interval 08/13/2023 162    • QRSD Interval 08/13/2023 98    • QT Interval 08/13/2023 350    • QTC Interval 08/13/2023 460    • P Axis 08/13/2023 74    • QRS Axis 08/13/2023 73    • T Wave Axis 08/13/2023 -30    • D-Dimer, Quant 08/13/2023 0.60 (H)    • BNP 08/13/2023 583 (H)    • SARS-CoV-2 08/13/2023 Negative    • hs TnI 2hr 08/13/2023 39    • Delta 2hr hsTnI 08/13/2023 0    • Ventricular Rate 08/13/2023 100    • Atrial Rate 08/13/2023 100    • AR Interval 08/13/2023 158    • QRSD Interval 08/13/2023 94    • QT Interval 08/13/2023 350    • QTC Interval 08/13/2023 451    • P Axis 08/13/2023 72    • QRS Axis 08/13/2023 73    • T Wave Axis 08/13/2023 -23    • Ventricular Rate 08/13/2023 97    • Atrial Rate 08/13/2023 97    • AR Interval 08/13/2023 156    • QRSD Interval 08/13/2023 96    • QT Interval 08/13/2023 362    • QTC Interval 08/13/2023 459    • P Axis 08/13/2023 72    • QRS Axis 08/13/2023 73    • T Wave Axis 08/13/2023 -24    Appointment on 08/09/2023   Component Date Value   • PSA, Diagnostic 08/09/2023 5.53 (H)    • Testosterone 08/09/2023 17 (L)    Office Visit on 08/02/2023   Component Date Value   • Ventricular Rate 08/02/2023 91    • Atrial Rate 08/02/2023 91    • AR Interval 08/02/2023 162    • QRSD Interval 08/02/2023 94    • QT Interval 08/02/2023 382    • QTC Interval 08/02/2023 469    • P Axis 08/02/2023 68    • QRS Axis 08/02/2023 52    • T Wave Axis 08/02/2023 -28    Appointment on 08/02/2023   Component Date Value   • Sodium 08/02/2023 143    • Potassium 08/02/2023 4.1    • Chloride 08/02/2023 102    • CO2 08/02/2023 31    • ANION GAP 08/02/2023 10    • BUN 08/02/2023 18    • Creatinine 08/02/2023 1.19    • Glucose, Fasting 08/02/2023 101 (H)    • Calcium 08/02/2023 9.3    • AST 08/02/2023 24    • ALT 08/02/2023 20    • Alkaline Phosphatase 08/02/2023 63    • Total Protein 08/02/2023 6.1 (L)    • Albumin 08/02/2023 4.3    • Total Bilirubin 08/02/2023 0.57    • eGFR 08/02/2023 57         Imaging: XR chest pa & lateral    Result Date: 9/20/2023  Narrative: CHEST INDICATION:   J44.1: Chronic obstructive pulmonary disease with (acute) exacerbation. COMPARISON: Compared with 8/16/2023 EXAM PERFORMED/VIEWS:  XR CHEST PA & LATERAL FINDINGS: Cardiomediastinal silhouette appears unremarkable. Bilateral lower lobe linear scarring. Improved from prior study. Bilateral upper lobe emphysematous changes. The lungs are otherwise clear. No pneumothorax. Blunted costophrenic angles seen previously are again seen. . Osseous structures appear within normal limits for patient age. Impression: No acute cardiopulmonary disease. Workstation performed: [id number]       Review of Systems:  Review of Systems   Constitutional: Negative for chills, diaphoresis, fatigue and fever. HENT: Positive for hearing loss. Negative for trouble swallowing and voice change. Eyes: Negative for pain and redness. Respiratory: Positive for shortness of breath (chronic baseline). Negative for cough. Cardiovascular: Negative for chest pain, palpitations and leg swelling. Gastrointestinal: Negative for abdominal pain, constipation, diarrhea, nausea and vomiting. Genitourinary: Negative for dysuria. Musculoskeletal: Positive for arthralgias. Skin: Negative for rash. Neurological: Positive for light-headedness. Negative for syncope and headaches. Psychiatric/Behavioral: Negative for agitation and confusion. All other systems reviewed and are negative. Vitals:    09/29/23 1518   BP: 92/68   Pulse: 68   Weight: 82.1 kg (181 lb)   Height: 5' 8" (1.727 m)     Vitals:    09/29/23 1518   Weight: 82.1 kg (181 lb)     Height: 5' 8" (172.7 cm)     Physical Exam:  Physical Exam  Vitals reviewed. Constitutional:       General: He is not in acute distress. Appearance: Normal appearance. He is not diaphoretic. HENT:      Head: Normocephalic and atraumatic. Comments: Nasal cannula oxygen in place  Eyes:      General:         Right eye: No discharge. Left eye: No discharge. Neck:      Comments: Trachea midline, no JVD present  Cardiovascular:      Rate and Rhythm: Normal rate and regular rhythm. Heart sounds: No friction rub. Pulmonary:      Effort: No respiratory distress. Breath sounds: No wheezing. Comments: Decreased breath sounds bilaterally  Chest:      Chest wall: No tenderness. Abdominal:      General: Bowel sounds are normal.      Palpations: Abdomen is soft. Tenderness: There is no abdominal tenderness. There is no rebound. Musculoskeletal:      Right lower leg: Edema (trace) present. Left lower leg: Edema (trace) present. Skin:     General: Skin is warm and dry. Neurological:      Mental Status: He is alert. Comments: Awake, alert, hard of hearing, able to answer questions appropriately and move extremities bilaterally.    Psychiatric:         Mood and Affect: Mood normal.         Behavior: Behavior normal.

## 2023-10-02 ENCOUNTER — CLINICAL SUPPORT (OUTPATIENT)
Dept: PULMONOLOGY | Facility: HOSPITAL | Age: 78
End: 2023-10-02
Attending: INTERNAL MEDICINE
Payer: COMMERCIAL

## 2023-10-02 ENCOUNTER — APPOINTMENT (OUTPATIENT)
Dept: LAB | Facility: HOSPITAL | Age: 78
End: 2023-10-02
Payer: COMMERCIAL

## 2023-10-02 ENCOUNTER — OFFICE VISIT (OUTPATIENT)
Dept: PULMONOLOGY | Facility: CLINIC | Age: 78
End: 2023-10-02
Payer: COMMERCIAL

## 2023-10-02 VITALS
WEIGHT: 182.8 LBS | TEMPERATURE: 96.2 F | HEIGHT: 68 IN | BODY MASS INDEX: 27.71 KG/M2 | SYSTOLIC BLOOD PRESSURE: 82 MMHG | OXYGEN SATURATION: 95 % | HEART RATE: 80 BPM | DIASTOLIC BLOOD PRESSURE: 52 MMHG

## 2023-10-02 DIAGNOSIS — Z23 ENCOUNTER FOR IMMUNIZATION: ICD-10-CM

## 2023-10-02 DIAGNOSIS — G47.33 OSA ON CPAP: ICD-10-CM

## 2023-10-02 DIAGNOSIS — J44.1 CHRONIC OBSTRUCTIVE PULMONARY DISEASE WITH ACUTE EXACERBATION (HCC): ICD-10-CM

## 2023-10-02 DIAGNOSIS — J44.1 COPD EXACERBATION (HCC): ICD-10-CM

## 2023-10-02 DIAGNOSIS — J96.11 CHRONIC RESPIRATORY FAILURE WITH HYPOXIA (HCC): Primary | ICD-10-CM

## 2023-10-02 DIAGNOSIS — J44.9 CHRONIC OBSTRUCTIVE PULMONARY DISEASE, UNSPECIFIED COPD TYPE (HCC): ICD-10-CM

## 2023-10-02 DIAGNOSIS — I50.20 HFREF (HEART FAILURE WITH REDUCED EJECTION FRACTION) (HCC): ICD-10-CM

## 2023-10-02 DIAGNOSIS — I50.20 CONGESTIVE HEART FAILURE WITH LEFT VENTRICULAR SYSTOLIC DYSFUNCTION (LVSD) (HCC): ICD-10-CM

## 2023-10-02 LAB
ANION GAP SERPL CALCULATED.3IONS-SCNC: 8 MMOL/L
BUN SERPL-MCNC: 30 MG/DL (ref 5–25)
CALCIUM SERPL-MCNC: 8.7 MG/DL (ref 8.4–10.2)
CHLORIDE SERPL-SCNC: 103 MMOL/L (ref 96–108)
CO2 SERPL-SCNC: 29 MMOL/L (ref 21–32)
CREAT SERPL-MCNC: 1.27 MG/DL (ref 0.6–1.3)
GFR SERPL CREATININE-BSD FRML MDRD: 53 ML/MIN/1.73SQ M
GLUCOSE P FAST SERPL-MCNC: 94 MG/DL (ref 65–99)
POTASSIUM SERPL-SCNC: 3.7 MMOL/L (ref 3.5–5.3)
SODIUM SERPL-SCNC: 140 MMOL/L (ref 135–147)

## 2023-10-02 PROCEDURE — 99215 OFFICE O/P EST HI 40 MIN: CPT | Performed by: INTERNAL MEDICINE

## 2023-10-02 PROCEDURE — 94625 PHY/QHP OP PULM RHB W/O MNTR: CPT

## 2023-10-02 PROCEDURE — 90662 IIV NO PRSV INCREASED AG IM: CPT

## 2023-10-02 PROCEDURE — 36415 COLL VENOUS BLD VENIPUNCTURE: CPT

## 2023-10-02 PROCEDURE — 80048 BASIC METABOLIC PNL TOTAL CA: CPT

## 2023-10-02 PROCEDURE — G0008 ADMIN INFLUENZA VIRUS VAC: HCPCS

## 2023-10-02 RX ORDER — AZITHROMYCIN 250 MG/1
TABLET, FILM COATED ORAL
Qty: 6 TABLET | Refills: 0 | Status: SHIPPED | OUTPATIENT
Start: 2023-10-02 | End: 2023-10-06

## 2023-10-02 RX ORDER — PREDNISONE 20 MG/1
40 TABLET ORAL DAILY
Qty: 10 TABLET | Refills: 0 | Status: SHIPPED | OUTPATIENT
Start: 2023-10-02

## 2023-10-02 NOTE — PROGRESS NOTES
Assessment/Plan:    Chronic respiratory failure with hypoxia (HCC)  Annemarie Newman has been able to wean down his oxygen to 2 L as his symptoms improve. Chronic obstructive pulmonary disease with acute exacerbation (HCC)  Annemarie Newman will continue budesonide and Perforomist twice daily. In addition he takes his DuoNebs 3-4 times a day. I was able to give him the flu shot today and he will get his upcoming COVID-vaccine. Congestive heart failure with left ventricular systolic dysfunction (LVSD) ([address])  Wt Readings from Last 3 Encounters:   10/02/23 82.9 kg (182 lb 12.8 oz)   09/29/23 82.1 kg (181 lb)   09/27/23 82.6 kg (182 lb 3.2 oz)       Annemarie Newman is following with cardiology and feels between his inhalers and his cardiology medications and pulmonary rehab his breathing is improving. That being said his blood pressure was quite low on today's exam 80/50. When I repeated it it was 90/60. I asked him to please give him an eye on his blood pressures at home and send me images. If he gets dizzy or lightheaded he needs to let his cardiologist know. Diagnoses and all orders for this visit:    Chronic respiratory failure with hypoxia (HCC)    COPD exacerbation ([address])  -     Ambulatory referral to Pulmonology  -     influenza vaccine, high-dose, PF 0.7 mL (FLUZONE HIGH-DOSE)  -     predniSONE 20 mg tablet; Take 2 tablets (40 mg total) by mouth daily  -     azithromycin (ZITHROMAX) 250 mg tablet; Take 2 tablets today then 1 tablet daily x 4 days    Encounter for immunization  -     influenza vaccine, high-dose, PF 0.7 mL (FLUZONE HIGH-DOSE)    LAMBERTO on CPAP    Chronic obstructive pulmonary disease with acute exacerbation (HCC)    Congestive heart failure with left ventricular systolic dysfunction (LVSD) ([facility])          Subjective:      Patient ID: Isadora Miller is a 77 y.o. male. Annemarie Newman is here for follow-up of his breathing. He has noted that since starting pulmonary rehab his breathing has improved significantly.   He is taking his nebulizers as prescribed and denies any cough or wheeze. He is able to wean down his oxygen to 2 L to keep his oxygen levels above 90. primary symptoms  Associated symptoms include headaches and myalgias. Pertinent negatives include no chest pain, fever or sore throat. The following portions of the patient's history were reviewed and updated as appropriate: allergies, current medications, past family history, past medical history, past social history, past surgical history and problem list.    Review of Systems   Constitutional: Negative for appetite change and fever. HENT: Positive for ear pain, postnasal drip, rhinorrhea, sneezing and trouble swallowing. Negative for sore throat. Respiratory: Positive for shortness of breath. Cardiovascular: Negative for chest pain. Musculoskeletal: Positive for myalgias. Neurological: Positive for headaches. Objective:      BP (!) 82/52 Comment: right arm /79/52 left arm  Pulse 80   Temp (!) 96.2 °F (35.7 °C)   Ht 5' 8" (1.727 m)   Wt 82.9 kg (182 lb 12.8 oz)   SpO2 95%   BMI 27.79 kg/m²          Physical Exam  Constitutional:       Appearance: He is well-developed. HENT:      Head: Normocephalic. Eyes:      Pupils: Pupils are equal, round, and reactive to light. Cardiovascular:      Rate and Rhythm: Normal rate. Pulmonary:      Effort: Pulmonary effort is normal. No respiratory distress. Breath sounds: No wheezing or rales. Abdominal:      Palpations: Abdomen is soft. Musculoskeletal:         General: Normal range of motion. Cervical back: Neck supple. Skin:     General: Skin is warm and dry. Neurological:      Mental Status: He is alert and oriented to person, place, and time.          Answers for HPI/ROS submitted by the patient on 10/1/2023  Do you have chest tightness?: Yes  Do you have difficulty breathing?: Yes  Do you experience frequent throat clearing?: Yes  Chronicity: chronic  When did you first notice your symptoms?: more than 1 year ago  How often do your symptoms occur?: intermittently  Since you first noticed this problem, how has it changed?: gradually improving  Do you have shortness of breath that occurs with effort or exertion?: Yes  Do you have ear congestion?: Yes  Do you have heartburn?: No  Do you have fatigue?: No  Do you have nasal congestion?: No  Do you have shortness of breath when lying flat?: No  Do you have shortness of breath when you wake up?: No  Do you have sweats?: No  Have you experienced weight loss?: Yes  Which of the following makes your symptoms worse?: climbing stairs, exposure to smoke, pollen, strenuous activity, URI  Which of the following makes your symptoms better?: change in position, diuretics, OTC cough suppressant, rest, steroid inhaler

## 2023-10-02 NOTE — ASSESSMENT & PLAN NOTE
Fritz Ingram will continue budesonide and Perforomist twice daily. In addition he takes his DuoNebs 3-4 times a day. I was able to give him the flu shot today and he will get his upcoming COVID-vaccine.

## 2023-10-02 NOTE — ASSESSMENT & PLAN NOTE
Wt Readings from Last 3 Encounters:   10/02/23 82.9 kg (182 lb 12.8 oz)   09/29/23 82.1 kg (181 lb)   09/27/23 82.6 kg (182 lb 3.2 oz)       Rylee Aponte is following with cardiology and feels between his inhalers and his cardiology medications and pulmonary rehab his breathing is improving. That being said his blood pressure was quite low on today's exam 80/50. When I repeated it it was 90/60. I asked him to please give him an eye on his blood pressures at home and send me images. If he gets dizzy or lightheaded he needs to let his cardiologist know.

## 2023-10-04 ENCOUNTER — TELEPHONE (OUTPATIENT)
Dept: NON INVASIVE DIAGNOSTICS | Facility: HOSPITAL | Age: 78
End: 2023-10-04

## 2023-10-04 NOTE — TELEPHONE ENCOUNTER
I was able to call and speak with patient's daughter Richard Tran. Informed her that due to hypotension we will discontinue spironolactone therapy and if patient's blood pressures remain low less than 658 mmHg systolically will need to reduce or potentially discontinue additional guideline directed medical therapy. Patient's daughter notes patient is overall feeling well and denies symptoms. Continue to monitor and wound they will let our office know blood pressure readings. ----- Message -----  From: Pat Mcclendon "Hayley Torres"  Sent: 10/4/2023   2:49 PM EDT  To: Cardiology [facility]  Subject: Blood pressure                                   My BP this afternoon was 82/45 pulse 68 and the second time I took it was 80/37 pulse 59. Should my medicine stay the same?

## 2023-10-06 ENCOUNTER — CLINICAL SUPPORT (OUTPATIENT)
Dept: PULMONOLOGY | Facility: HOSPITAL | Age: 78
End: 2023-10-06
Attending: INTERNAL MEDICINE
Payer: COMMERCIAL

## 2023-10-06 DIAGNOSIS — J44.9 CHRONIC OBSTRUCTIVE PULMONARY DISEASE, UNSPECIFIED COPD TYPE (HCC): ICD-10-CM

## 2023-10-06 PROCEDURE — 94626 PHY/QHP OP PULM RHB W/MNTR: CPT

## 2023-10-11 ENCOUNTER — CLINICAL SUPPORT (OUTPATIENT)
Dept: PULMONOLOGY | Facility: HOSPITAL | Age: 78
End: 2023-10-11
Attending: INTERNAL MEDICINE
Payer: COMMERCIAL

## 2023-10-11 DIAGNOSIS — J44.9 CHRONIC OBSTRUCTIVE PULMONARY DISEASE, UNSPECIFIED COPD TYPE (HCC): Primary | ICD-10-CM

## 2023-10-11 PROCEDURE — 94626 PHY/QHP OP PULM RHB W/MNTR: CPT

## 2023-10-11 NOTE — PROGRESS NOTES
Pulmonary Rehabilitation Plan of Care   30 Day Reassessment      Today's date: 10/11/2023   # of Exercise Sessions Completed: 7  Patient name: Jayson Camarena      : 1945  Age: 77 y.o. MRN: [id number]  Referring Physician: Gulshan Duke MD (Internal Med)  Pulmonologist: Anisa Henson DO  Provider: [facility]  Clinician: Mando Lee MS    Dx:  COPD, very severe  Ratio 44%  Date of onset: 2023 (Last hospitalized exacerbation)      SUMMARY OF PROGRESS:  Mariann Cruz has completed 7 sessions of Pulmonary Rehab for the diagnosis of COPD. Patient does have a PFT on file, revealing an FEV1/FVC ratio of 44% and an FEV1 of 1.06 L (38%). This is suggestive of severeobstruction. Since their diagnosis, the patient has been experiencing increased dyspnea, increased sitting time, decreased physical activity, increased fatigue and increased reliance on supplemental O2. They report dyspnea and fatigue when completing ADLs . The patient currently does not follow a formal exercise program at home. Pt reports the following physical limitations: decreased strength, fatigue, CHACON. Depression screening using the PHQ-9 interprets the patient's score of 5 5-9 = Mild Depression. Anxiety screening using the PORTILLO-7 interprets the patients score of 3  0-4  = Not anxious. When addressed, the patient admits to having depression/anxiety. Patient reports excellent social/emotional support from friends and family. Information to begin using [employer] was provided as well as contact information for counseling through [employer]. The patient is a former smoker (quit 28 years ago). He has abstained since quitting. Patient admits to 100% medication compliance. At rest, the patient rated dyspnea 0/10 with SpO2 88% on supplemental O2 3L/min via nasal canula. Resting HR 86 and /70. He has been working at a 3.68 MET level. During exercise, , /66, and SpO2 86-96%. Patient exercises on 3-4L/min. Telemetry revealed NSR w/ freq PVCs. During recovery, HR 90, /64, and SpO2 94%. Education on smoking cessation, oxygen use, breathing techniques, pulmonary anatomy, exercise for the pulmonary patient, healthy eating, stress, and relaxation will be provided. Patient goals include: increase strength, improve CHACON, improve LVEF to qualify for [place] valve procedure. Evie Moncada has been compliant in attending his scheduled pulmonary rehab sessions and gives great effort each session. He requires 3-4L/min of supplemental O2. He rates dyspnea a 0-4 on a 1-10 scale. His program will be progressed as tolerated. Due to Shlomo's known cardiac history and need for increased LVEF, he will also be monitored via telemetry. Telemetery revealed NSR w/ freq ectopy. Freq ectopy is noted in recent cardiology notes.  His primary cardiologist is Sarah Pedersen DO and advanced heart failure cardiologist is Damien Artis MD.      Medication compliance: Yes   Comments: Pt reports to be compliant with medications    Fall Risk: Low   Comments: Ambulates with a steady gait with no assist device    Smoking: Former user    RPD at Rest: 1/10  RPD with Exercise:  0-4/10    Assessment of progression of lung disease and functional status:  CAT: 19/40  Shortness of breath questionnaire: 47/120      EXERCISE ASSESSMENT and PLAN    Current Exercise Program in Rehab:       Frequency: 2-3 days/week   Supplement with home exercise 2+ days/wk as tolerated        Minutes: 30-35         METS: 3.68              SpO2: >88%              RPD: 4-6                      HR: 20-30 bpm above rest   RPE: 4-5         Modalities: UBE, NuStep, Recumbent bike and Room walking      Exercise Progression 30 Day Goals :    Frequency: 2-3 days/week    Supplement with home exercise 2+ days/wk as tolerated       Minutes: 40-45         METS: 3.68-4.5              SpO2: >88%              RPD: 4-6                      HR: 20-30 bpm above rest   RPE: 4-5        Modalities: UBE, NuStep, Recumbent bike and Room walking     Strength training: Will be added following 2-3 weeks of monitored exercise sessions   Modalities: Leg Press, Chest Press and Calf Raises    Oxygen needs:   Rest:  supplemental O2 via nasal cannula @ 2L/min   Exercise/physical activity:  supplemental O2 via nasal cannula @ 3-4L/min   Sleep:   supplemental O2 via nasal cannula @ 2.5L/min  and CPAP/BiPap    Oxygen Goal: Maintain SpO2>88% during exercise    Home Exercise: none  Education: pursed lipped breathing, diaphragmatic breathing, fall prevention while using nasal canula tubing, relaxation breathing, home exercise guidelines, benefits of exercise for pulmonary disease, RPE scale and RPD scale    Goals: reduced score on  USCD Shortness of Breath Questionnaire, Improved 6MW distance by 10%, reduced dyspnea during exercise , improved exercise tolerance (max METs tolerated in pulmonary rehab), SpO2 >88% during exercise, reduced dependence on supplemental O2, reduced score on CAT, reduced number of COPD exacerbations and attend pulmonary rehab regularly  Progressing:  Pt is progressing and showing improvement  toward the following goals:  increasing MET level, reduced CHACON, improved SpO2 readings. , Will continue to educate and progress as tolerated., Goals met: attends rehab regularly. Plan: Titrate supplemental oxygen as needed to maintain SpO2>88% with exercise, learn to conserve energy with ADLs , practice diaphragmatic breathing and reduce time sitting at home    Readiness to change: Preparation:  (Getting ready to change)       NUTRITION ASSESSMENT AND PLAN    Weight control:    Starting weight: 182   Current weight: 182    Diabetes: N/A  A1c: 6.5    last measured: 9/2/2023    Lipid Panel: 9/2/2023  Tot: 159, Tri: 171, HDL: 73, LDL: 52    Goals: Increase water intake to reduce/thin mucus production Reduced SOB while eating Smaller more frequent meals Improve hydration  Education: heart healthy eating  low sodium diet  hydration  nutrition for  lipid management  Progressing:Will continue to educate and progress as tolerated. Plan: Education class: Reading Food Labels and Education Class: Heart Healthy Eating  Readiness to change: Preparation:  (Getting ready to change)       PSYCHOSOCIAL ASSESSMENT AND PLAN    Emotional:  Depression assessment:  PHQ-9 = 5 5-9 = Mild Depression            Anxiety measure:  PORTILLO-7 = 3 0-4  = Not anxious  Self-reported stress level: 5   Social support: Very Good    Goals:  Reduce perceived stress to 1-3/10, improved [facility] QOL < 27, PHQ-9 - reduced severity by one level, Increased interest in doing things, improved sleep, Improved appetite and improved concentration  Education: signs/sxs of depression, benefits of a positive support system, stress management techniques, depression and CAD and benefits of mental health counseling    Progressing:Pt is progressing and showing improvement  toward the following goals:  improving stress level.  , Will continue to educate and progress as tolerated.   Plan: Class: Stress and Your Health and Class: Relaxation  Readiness to change: Preparation:  (Getting ready to change)       OTHER CORE COMPONENTS     Tobacco:   Social History     Tobacco Use   Smoking Status Former    Packs/day: 2.00    Years: 35.00    Total pack years: 70.00    Types: Cigarettes    Start date: 1960    Quit date: 1995    Years since quittin.2   Smokeless Tobacco Never   Tobacco Comments    stopped smoking the day i had a heart attack       Tobacco Use Intervention: Referral to tobacco expert:   N/A: Pt has a remote history of smoking    Blood pressure:    Restin/70   Exercise: 136/66   Recovery: 110/64    Goals: consistent BP < 130/80, reduced dietary sodium <2300mg, moderate intensity exercise >150 mins/wk and medication compliance  Education:  pathophysiology of pulmonary disease, preventing infections, environmental triggers, nebulizer use, bronchodilators, bronchial hygiene, traveling with pulmonary disease, class: Pulmonary Anatomy and Physiology and class:  Pulmonary Medications  Progressing:Will continue to educate and progress as tolerated., Goals met: BP < 130/80, 100% medication compliance.   Plan: Class: Understanding Heart Disease and Class: Common Heart Medications  Readiness to change: Preparation:  (Getting ready to change)

## 2023-10-13 ENCOUNTER — TELEPHONE (OUTPATIENT)
Dept: CARDIOLOGY CLINIC | Facility: CLINIC | Age: 78
End: 2023-10-13

## 2023-10-13 ENCOUNTER — PATIENT OUTREACH (OUTPATIENT)
Dept: HEMATOLOGY ONCOLOGY | Facility: CLINIC | Age: 78
End: 2023-10-13

## 2023-10-13 ENCOUNTER — CLINICAL SUPPORT (OUTPATIENT)
Dept: PULMONOLOGY | Facility: HOSPITAL | Age: 78
End: 2023-10-13
Attending: INTERNAL MEDICINE
Payer: COMMERCIAL

## 2023-10-13 ENCOUNTER — DOCUMENTATION (OUTPATIENT)
Dept: HEMATOLOGY ONCOLOGY | Facility: CLINIC | Age: 78
End: 2023-10-13

## 2023-10-13 DIAGNOSIS — J44.9 CHRONIC OBSTRUCTIVE PULMONARY DISEASE, UNSPECIFIED COPD TYPE (HCC): ICD-10-CM

## 2023-10-13 PROCEDURE — 94626 PHY/QHP OP PULM RHB W/MNTR: CPT

## 2023-10-13 NOTE — TELEPHONE ENCOUNTER
Called patient and gave results. ----- Message from Fabiano Doyle MD sent at 10/10/2023 12:29 PM EDT -----  Please notify pt his labs are acceptable.       Thanks!    - Darrell Plummer

## 2023-10-13 NOTE — PROGRESS NOTES
After speaking with Dr Elsie Wiggins, she states that patient is cleared to proceed with SpaceOAR/Markers and RT. Chalino Lopez, DO  Juan A Suárez  Yes he is cleared for placement before RT         ----- Message -----  From: Jimena Brooks MA  Sent: 10/12/2023   1:27 PM EDT  To: Rafaela Iqbal, DO  I understand you stated he is safe for RT, is he medically cleared to undergo SpaceOAR and Fiducial marker placement before RT? I just want to be clear and make sure you were aware he needs this first.      A message was sent to RAD ONC RN to address with Dr. Rojelio Pendleton to clarify that SpaceOAR/Markers are still needed. I will follow up on response in the near future.

## 2023-10-13 NOTE — PROGRESS NOTES
Outreach made to patient to clarify if he would like to pursue radiation therapy at this time. I explained to patients daughter that if he chose to pursue RT at this time, Dr Mark Anthony Lozano did clear him for the SpaceOAR/Marker procedure. She stated that he wishes to continue ADT and follow with urology. Patient has follow up urology appt on 1/11/24 for Lupron. Patients daughter has my direct line if patient wishes to pursue RT in the future.

## 2023-10-16 ENCOUNTER — APPOINTMENT (OUTPATIENT)
Dept: PULMONOLOGY | Facility: HOSPITAL | Age: 78
End: 2023-10-16
Attending: INTERNAL MEDICINE
Payer: COMMERCIAL

## 2023-10-16 PROBLEM — J18.9 SEPSIS DUE TO PNEUMONIA (HCC): Status: RESOLVED | Noted: 2023-04-03 | Resolved: 2023-10-16

## 2023-10-16 PROBLEM — A41.9 SEPSIS DUE TO PNEUMONIA (HCC): Status: RESOLVED | Noted: 2023-04-03 | Resolved: 2023-10-16

## 2023-10-18 ENCOUNTER — APPOINTMENT (EMERGENCY)
Dept: RADIOLOGY | Facility: HOSPITAL | Age: 78
DRG: 193 | End: 2023-10-18
Payer: COMMERCIAL

## 2023-10-18 ENCOUNTER — APPOINTMENT (OUTPATIENT)
Dept: PULMONOLOGY | Facility: HOSPITAL | Age: 78
End: 2023-10-18
Attending: INTERNAL MEDICINE
Payer: COMMERCIAL

## 2023-10-18 ENCOUNTER — HOSPITAL ENCOUNTER (INPATIENT)
Facility: HOSPITAL | Age: 78
LOS: 8 days | Discharge: HOME HEALTH | DRG: 193 | End: 2023-10-27
Attending: EMERGENCY MEDICINE | Admitting: INTERNAL MEDICINE
Payer: COMMERCIAL

## 2023-10-18 DIAGNOSIS — E87.6 HYPOKALEMIA: ICD-10-CM

## 2023-10-18 DIAGNOSIS — I50.813 ACUTE ON CHRONIC RIGHT-SIDED CONGESTIVE HEART FAILURE (HCC): ICD-10-CM

## 2023-10-18 DIAGNOSIS — J44.1 COPD EXACERBATION (HCC): ICD-10-CM

## 2023-10-18 DIAGNOSIS — R78.81 BACTEREMIA: ICD-10-CM

## 2023-10-18 DIAGNOSIS — J18.9 PNEUMONIA: Primary | ICD-10-CM

## 2023-10-18 DIAGNOSIS — R09.02 HYPOXIA: ICD-10-CM

## 2023-10-18 DIAGNOSIS — I50.21 ACUTE SYSTOLIC CONGESTIVE HEART FAILURE (HCC): ICD-10-CM

## 2023-10-18 LAB
ALBUMIN SERPL BCP-MCNC: 3.6 G/DL (ref 3.5–5)
ALP SERPL-CCNC: 53 U/L (ref 34–104)
ALT SERPL W P-5'-P-CCNC: 20 U/L (ref 7–52)
ANION GAP SERPL CALCULATED.3IONS-SCNC: 7 MMOL/L
AST SERPL W P-5'-P-CCNC: 22 U/L (ref 13–39)
BASOPHILS # BLD AUTO: 0.03 THOUSANDS/ÂΜL (ref 0–0.1)
BASOPHILS NFR BLD AUTO: 0 % (ref 0–1)
BILIRUB SERPL-MCNC: 0.66 MG/DL (ref 0.2–1)
BNP SERPL-MCNC: 774 PG/ML (ref 0–100)
BUN SERPL-MCNC: 13 MG/DL (ref 5–25)
CALCIUM SERPL-MCNC: 8.6 MG/DL (ref 8.4–10.2)
CARDIAC TROPONIN I PNL SERPL HS: 26 NG/L
CHLORIDE SERPL-SCNC: 105 MMOL/L (ref 96–108)
CO2 SERPL-SCNC: 31 MMOL/L (ref 21–32)
CREAT SERPL-MCNC: 1.02 MG/DL (ref 0.6–1.3)
EOSINOPHIL # BLD AUTO: 0.24 THOUSAND/ÂΜL (ref 0–0.61)
EOSINOPHIL NFR BLD AUTO: 4 % (ref 0–6)
ERYTHROCYTE [DISTWIDTH] IN BLOOD BY AUTOMATED COUNT: 15.5 % (ref 11.6–15.1)
FLUAV RNA RESP QL NAA+PROBE: NEGATIVE
FLUBV RNA RESP QL NAA+PROBE: NEGATIVE
FOLATE SERPL-MCNC: 21.4 NG/ML
GFR SERPL CREATININE-BSD FRML MDRD: 69 ML/MIN/1.73SQ M
GLUCOSE SERPL-MCNC: 164 MG/DL (ref 65–140)
HCT VFR BLD AUTO: 44 % (ref 36.5–49.3)
HGB BLD-MCNC: 13.6 G/DL (ref 12–17)
IMM GRANULOCYTES # BLD AUTO: 0.02 THOUSAND/UL (ref 0–0.2)
IMM GRANULOCYTES NFR BLD AUTO: 0 % (ref 0–2)
LACTATE SERPL-SCNC: 1.7 MMOL/L (ref 0.5–2)
LYMPHOCYTES # BLD AUTO: 0.85 THOUSANDS/ÂΜL (ref 0.6–4.47)
LYMPHOCYTES NFR BLD AUTO: 13 % (ref 14–44)
MCH RBC QN AUTO: 30.6 PG (ref 26.8–34.3)
MCHC RBC AUTO-ENTMCNC: 30.9 G/DL (ref 31.4–37.4)
MCV RBC AUTO: 99 FL (ref 82–98)
MONOCYTES # BLD AUTO: 0.53 THOUSAND/ÂΜL (ref 0.17–1.22)
MONOCYTES NFR BLD AUTO: 8 % (ref 4–12)
NEUTROPHILS # BLD AUTO: 5.1 THOUSANDS/ÂΜL (ref 1.85–7.62)
NEUTS SEG NFR BLD AUTO: 75 % (ref 43–75)
NRBC BLD AUTO-RTO: 0 /100 WBCS
PLATELET # BLD AUTO: 140 THOUSANDS/UL (ref 149–390)
PMV BLD AUTO: 11 FL (ref 8.9–12.7)
POTASSIUM SERPL-SCNC: 3.1 MMOL/L (ref 3.5–5.3)
PROCALCITONIN SERPL-MCNC: 0.1 NG/ML
PROT SERPL-MCNC: 5.4 G/DL (ref 6.4–8.4)
RBC # BLD AUTO: 4.44 MILLION/UL (ref 3.88–5.62)
RSV RNA RESP QL NAA+PROBE: NEGATIVE
SARS-COV-2 RNA RESP QL NAA+PROBE: NEGATIVE
SODIUM SERPL-SCNC: 143 MMOL/L (ref 135–147)
VIT B12 SERPL-MCNC: 426 PG/ML (ref 180–914)
WBC # BLD AUTO: 6.77 THOUSAND/UL (ref 4.31–10.16)

## 2023-10-18 PROCEDURE — 0241U HB NFCT DS VIR RESP RNA 4 TRGT: CPT | Performed by: PHYSICIAN ASSISTANT

## 2023-10-18 PROCEDURE — 80053 COMPREHEN METABOLIC PANEL: CPT | Performed by: PHYSICIAN ASSISTANT

## 2023-10-18 PROCEDURE — 99223 1ST HOSP IP/OBS HIGH 75: CPT | Performed by: STUDENT IN AN ORGANIZED HEALTH CARE EDUCATION/TRAINING PROGRAM

## 2023-10-18 PROCEDURE — 87449 NOS EACH ORGANISM AG IA: CPT | Performed by: STUDENT IN AN ORGANIZED HEALTH CARE EDUCATION/TRAINING PROGRAM

## 2023-10-18 PROCEDURE — 94760 N-INVAS EAR/PLS OXIMETRY 1: CPT

## 2023-10-18 PROCEDURE — 85025 COMPLETE CBC W/AUTO DIFF WBC: CPT | Performed by: PHYSICIAN ASSISTANT

## 2023-10-18 PROCEDURE — 96368 THER/DIAG CONCURRENT INF: CPT

## 2023-10-18 PROCEDURE — 94640 AIRWAY INHALATION TREATMENT: CPT

## 2023-10-18 PROCEDURE — 84484 ASSAY OF TROPONIN QUANT: CPT | Performed by: PHYSICIAN ASSISTANT

## 2023-10-18 PROCEDURE — 82746 ASSAY OF FOLIC ACID SERUM: CPT | Performed by: STUDENT IN AN ORGANIZED HEALTH CARE EDUCATION/TRAINING PROGRAM

## 2023-10-18 PROCEDURE — 99285 EMERGENCY DEPT VISIT HI MDM: CPT

## 2023-10-18 PROCEDURE — 87070 CULTURE OTHR SPECIMN AEROBIC: CPT | Performed by: STUDENT IN AN ORGANIZED HEALTH CARE EDUCATION/TRAINING PROGRAM

## 2023-10-18 PROCEDURE — 83880 ASSAY OF NATRIURETIC PEPTIDE: CPT | Performed by: PHYSICIAN ASSISTANT

## 2023-10-18 PROCEDURE — 83605 ASSAY OF LACTIC ACID: CPT | Performed by: PHYSICIAN ASSISTANT

## 2023-10-18 PROCEDURE — 82607 VITAMIN B-12: CPT | Performed by: STUDENT IN AN ORGANIZED HEALTH CARE EDUCATION/TRAINING PROGRAM

## 2023-10-18 PROCEDURE — 87205 SMEAR GRAM STAIN: CPT | Performed by: STUDENT IN AN ORGANIZED HEALTH CARE EDUCATION/TRAINING PROGRAM

## 2023-10-18 PROCEDURE — 93005 ELECTROCARDIOGRAM TRACING: CPT

## 2023-10-18 PROCEDURE — 99285 EMERGENCY DEPT VISIT HI MDM: CPT | Performed by: PHYSICIAN ASSISTANT

## 2023-10-18 PROCEDURE — 87077 CULTURE AEROBIC IDENTIFY: CPT | Performed by: PHYSICIAN ASSISTANT

## 2023-10-18 PROCEDURE — 71045 X-RAY EXAM CHEST 1 VIEW: CPT

## 2023-10-18 PROCEDURE — 87040 BLOOD CULTURE FOR BACTERIA: CPT | Performed by: PHYSICIAN ASSISTANT

## 2023-10-18 PROCEDURE — 84145 PROCALCITONIN (PCT): CPT | Performed by: STUDENT IN AN ORGANIZED HEALTH CARE EDUCATION/TRAINING PROGRAM

## 2023-10-18 PROCEDURE — 36415 COLL VENOUS BLD VENIPUNCTURE: CPT | Performed by: PHYSICIAN ASSISTANT

## 2023-10-18 PROCEDURE — 96375 TX/PRO/DX INJ NEW DRUG ADDON: CPT

## 2023-10-18 PROCEDURE — 96365 THER/PROPH/DIAG IV INF INIT: CPT

## 2023-10-18 RX ORDER — LEVALBUTEROL INHALATION SOLUTION 1.25 MG/3ML
1.25 SOLUTION RESPIRATORY (INHALATION)
Status: DISCONTINUED | OUTPATIENT
Start: 2023-10-18 | End: 2023-10-27 | Stop reason: HOSPADM

## 2023-10-18 RX ORDER — ACETAMINOPHEN 325 MG/1
650 TABLET ORAL EVERY 6 HOURS PRN
Status: DISCONTINUED | OUTPATIENT
Start: 2023-10-18 | End: 2023-10-27 | Stop reason: HOSPADM

## 2023-10-18 RX ORDER — METOPROLOL SUCCINATE 25 MG/1
25 TABLET, EXTENDED RELEASE ORAL DAILY
Status: DISCONTINUED | OUTPATIENT
Start: 2023-10-18 | End: 2023-10-19

## 2023-10-18 RX ORDER — FORMOTEROL FUMARATE 20 UG/2ML
20 SOLUTION RESPIRATORY (INHALATION)
Status: DISCONTINUED | OUTPATIENT
Start: 2023-10-18 | End: 2023-10-26

## 2023-10-18 RX ORDER — ALBUTEROL SULFATE 2.5 MG/3ML
5 SOLUTION RESPIRATORY (INHALATION) ONCE
Status: COMPLETED | OUTPATIENT
Start: 2023-10-18 | End: 2023-10-18

## 2023-10-18 RX ORDER — CEFTRIAXONE 1 G/50ML
1000 INJECTION, SOLUTION INTRAVENOUS EVERY 24 HOURS
Status: DISCONTINUED | OUTPATIENT
Start: 2023-10-19 | End: 2023-10-19

## 2023-10-18 RX ORDER — POTASSIUM CHLORIDE 20 MEQ/1
40 TABLET, EXTENDED RELEASE ORAL ONCE
Status: COMPLETED | OUTPATIENT
Start: 2023-10-18 | End: 2023-10-18

## 2023-10-18 RX ORDER — METHYLPREDNISOLONE SODIUM SUCCINATE 125 MG/2ML
125 INJECTION, POWDER, LYOPHILIZED, FOR SOLUTION INTRAMUSCULAR; INTRAVENOUS ONCE
Status: COMPLETED | OUTPATIENT
Start: 2023-10-18 | End: 2023-10-18

## 2023-10-18 RX ORDER — FUROSEMIDE 10 MG/ML
40 INJECTION INTRAMUSCULAR; INTRAVENOUS ONCE
Status: DISCONTINUED | OUTPATIENT
Start: 2023-10-18 | End: 2023-10-19

## 2023-10-18 RX ORDER — METHYLPREDNISOLONE SODIUM SUCCINATE 40 MG/ML
40 INJECTION, POWDER, LYOPHILIZED, FOR SOLUTION INTRAMUSCULAR; INTRAVENOUS EVERY 8 HOURS
Status: DISCONTINUED | OUTPATIENT
Start: 2023-10-18 | End: 2023-10-20

## 2023-10-18 RX ORDER — GUAIFENESIN 600 MG/1
1200 TABLET, EXTENDED RELEASE ORAL 2 TIMES DAILY
Status: DISCONTINUED | OUTPATIENT
Start: 2023-10-18 | End: 2023-10-27 | Stop reason: HOSPADM

## 2023-10-18 RX ORDER — SODIUM CHLORIDE FOR INHALATION 0.9 %
3 VIAL, NEBULIZER (ML) INHALATION
Status: DISCONTINUED | OUTPATIENT
Start: 2023-10-18 | End: 2023-10-18

## 2023-10-18 RX ORDER — BENZONATATE 100 MG/1
100 CAPSULE ORAL 3 TIMES DAILY PRN
Status: DISCONTINUED | OUTPATIENT
Start: 2023-10-18 | End: 2023-10-21

## 2023-10-18 RX ORDER — CEFTRIAXONE 1 G/50ML
1000 INJECTION, SOLUTION INTRAVENOUS ONCE
Status: COMPLETED | OUTPATIENT
Start: 2023-10-18 | End: 2023-10-18

## 2023-10-18 RX ORDER — FUROSEMIDE 20 MG/1
20 TABLET ORAL DAILY
Status: DISCONTINUED | OUTPATIENT
Start: 2023-10-19 | End: 2023-10-19

## 2023-10-18 RX ORDER — MAGNESIUM SULFATE HEPTAHYDRATE 40 MG/ML
2 INJECTION, SOLUTION INTRAVENOUS ONCE
Status: COMPLETED | OUTPATIENT
Start: 2023-10-18 | End: 2023-10-19

## 2023-10-18 RX ORDER — POTASSIUM CHLORIDE 14.9 MG/ML
20 INJECTION INTRAVENOUS ONCE
Status: COMPLETED | OUTPATIENT
Start: 2023-10-18 | End: 2023-10-19

## 2023-10-18 RX ORDER — POTASSIUM CHLORIDE 20 MEQ/1
20 TABLET, EXTENDED RELEASE ORAL ONCE
Status: DISCONTINUED | OUTPATIENT
Start: 2023-10-18 | End: 2023-10-18

## 2023-10-18 RX ORDER — ATORVASTATIN CALCIUM 80 MG/1
80 TABLET, FILM COATED ORAL
Status: DISCONTINUED | OUTPATIENT
Start: 2023-10-18 | End: 2023-10-27 | Stop reason: HOSPADM

## 2023-10-18 RX ORDER — ASPIRIN 81 MG/1
81 TABLET, CHEWABLE ORAL
Status: DISCONTINUED | OUTPATIENT
Start: 2023-10-18 | End: 2023-10-27 | Stop reason: HOSPADM

## 2023-10-18 RX ORDER — GABAPENTIN 300 MG/1
300 CAPSULE ORAL
Status: DISCONTINUED | OUTPATIENT
Start: 2023-10-18 | End: 2023-10-27 | Stop reason: HOSPADM

## 2023-10-18 RX ORDER — HEPARIN SODIUM 5000 [USP'U]/ML
5000 INJECTION, SOLUTION INTRAVENOUS; SUBCUTANEOUS EVERY 8 HOURS SCHEDULED
Status: DISCONTINUED | OUTPATIENT
Start: 2023-10-18 | End: 2023-10-27 | Stop reason: HOSPADM

## 2023-10-18 RX ORDER — BUDESONIDE 0.5 MG/2ML
0.5 INHALANT ORAL
Status: DISCONTINUED | OUTPATIENT
Start: 2023-10-18 | End: 2023-10-27 | Stop reason: HOSPADM

## 2023-10-18 RX ORDER — FAMOTIDINE 20 MG/1
20 TABLET, FILM COATED ORAL
Status: DISCONTINUED | OUTPATIENT
Start: 2023-10-18 | End: 2023-10-27 | Stop reason: HOSPADM

## 2023-10-18 RX ORDER — PANTOPRAZOLE SODIUM 20 MG/1
20 TABLET, DELAYED RELEASE ORAL
Status: DISCONTINUED | OUTPATIENT
Start: 2023-10-19 | End: 2023-10-27 | Stop reason: HOSPADM

## 2023-10-18 RX ADMIN — ASPIRIN 81 MG CHEWABLE TABLET 81 MG: 81 TABLET CHEWABLE at 21:02

## 2023-10-18 RX ADMIN — POTASSIUM CHLORIDE 20 MEQ: 14.9 INJECTION, SOLUTION INTRAVENOUS at 13:08

## 2023-10-18 RX ADMIN — CEFTRIAXONE 1000 MG: 1 INJECTION, SOLUTION INTRAVENOUS at 13:29

## 2023-10-18 RX ADMIN — ALBUTEROL SULFATE 5 MG: 2.5 SOLUTION RESPIRATORY (INHALATION) at 12:26

## 2023-10-18 RX ADMIN — FAMOTIDINE 20 MG: 20 TABLET, FILM COATED ORAL at 21:02

## 2023-10-18 RX ADMIN — HEPARIN SODIUM 5000 UNITS: 5000 INJECTION INTRAVENOUS; SUBCUTANEOUS at 15:16

## 2023-10-18 RX ADMIN — AZITHROMYCIN MONOHYDRATE 500 MG: 500 INJECTION, POWDER, LYOPHILIZED, FOR SOLUTION INTRAVENOUS at 14:23

## 2023-10-18 RX ADMIN — MAGNESIUM SULFATE HEPTAHYDRATE 2 G: 40 INJECTION, SOLUTION INTRAVENOUS at 15:50

## 2023-10-18 RX ADMIN — GABAPENTIN 300 MG: 300 CAPSULE ORAL at 21:02

## 2023-10-18 RX ADMIN — METHYLPREDNISOLONE SODIUM SUCCINATE 40 MG: 40 INJECTION, POWDER, FOR SOLUTION INTRAMUSCULAR; INTRAVENOUS at 21:02

## 2023-10-18 RX ADMIN — FORMOTEROL FUMARATE DIHYDRATE 20 MCG: 20 SOLUTION RESPIRATORY (INHALATION) at 20:29

## 2023-10-18 RX ADMIN — GUAIFENESIN 1200 MG: 600 TABLET ORAL at 17:49

## 2023-10-18 RX ADMIN — LEVALBUTEROL HYDROCHLORIDE 1.25 MG: 1.25 SOLUTION RESPIRATORY (INHALATION) at 19:59

## 2023-10-18 RX ADMIN — POTASSIUM CHLORIDE 40 MEQ: 1500 TABLET, EXTENDED RELEASE ORAL at 12:50

## 2023-10-18 RX ADMIN — ATORVASTATIN CALCIUM 80 MG: 80 TABLET, FILM COATED ORAL at 15:50

## 2023-10-18 RX ADMIN — SACUBITRIL AND VALSARTAN 1 TABLET: 24; 26 TABLET, FILM COATED ORAL at 17:48

## 2023-10-18 RX ADMIN — BUDESONIDE 0.5 MG: 0.5 INHALANT ORAL at 19:59

## 2023-10-18 RX ADMIN — POTASSIUM CHLORIDE 40 MEQ: 1500 TABLET, EXTENDED RELEASE ORAL at 15:50

## 2023-10-18 RX ADMIN — METHYLPREDNISOLONE SODIUM SUCCINATE 125 MG: 125 INJECTION, POWDER, FOR SOLUTION INTRAMUSCULAR; INTRAVENOUS at 12:26

## 2023-10-18 RX ADMIN — IPRATROPIUM BROMIDE 0.5 MG: 0.5 SOLUTION RESPIRATORY (INHALATION) at 12:26

## 2023-10-18 RX ADMIN — IPRATROPIUM BROMIDE 0.5 MG: 0.5 SOLUTION RESPIRATORY (INHALATION) at 19:59

## 2023-10-18 NOTE — RESPIRATORY THERAPY NOTE
RT Protocol Note  Mireya Awad 77 y.o. male MRN: [id number]  Unit/Bed#: -01 Encounter: [id number]    Assessment    Principal Problem:    Pneumonia of right lower lobe due to infectious organism  Active Problems:    Coronary artery disease involving native coronary artery of native heart without angina pectoris    Hypertension    COPD with acute exacerbation (HCC)    Chronic diastolic (congestive) heart failure (HCC)    LAMBERTO on CPAP    Chronic kidney disease, stage 3a ([address])      Home Pulmonary Medications:  Duoneb QID, performist and pulmicort BID  Home Devices/Therapy: (P) Home O2, BiPAP/CPAP    Past Medical History:   Diagnosis Date    Abnormal ECG 2021 OR 2022    Alcoholism ([address]) 1984    sober 38 years    Arthritis 2008    beginning    Bilateral carotid artery stenosis     Callus     Cancer (HCC)     skin    Chronic diastolic heart failure (HCC)     Chronic ischemic heart disease     Chronic kidney disease     stage 3    Colon polyp     COPD (chronic obstructive pulmonary disease) (HCC)     Coronary artery disease     hx stents, MI, PCI    COVID 11/2021    CPAP (continuous positive airway pressure) dependence     Disease of thyroid gland 2017    nodules    Emphysema of lung ([address]) 1995    started    Hearing loss     History of transfusion 1995    Hyperlipidemia     Hypertension     Intracranial aneurysm 04/25/2023    Lung nodule 2023    x rays    MI (myocardial infarction) ([address]) 1995    Myocardial infarction ([address]) 1995    Pneumonia     Pneumothorax 02/20/2023    collapsed lung    Prostate cancer ([address])     RSV (respiratory syncytial virus infection) 10/2022    S/P carotid endarterectomy     Shortness of breath     O2 2 l/nc PRN    Sleep apnea     Sleep apnea, obstructive     Stented coronary artery      Social History     Socioeconomic History    Marital status: /Civil Union     Spouse name: None    Number of children: None    Years of education: None    Highest education level: None   Occupational History    None   Tobacco Use    Smoking status: Former     Packs/day: 2.00     Years: 35.00     Total pack years: 70.00     Types: Cigarettes     Start date: 1960     Quit date: 1995     Years since quittin.3    Smokeless tobacco: Never    Tobacco comments:     stopped smoking the day i had a heart attack   Vaping Use    Vaping Use: Never used   Substance and Sexual Activity    Alcohol use: Not Currently    Drug use: Never    Sexual activity: Not Currently     Partners: Female     Birth control/protection: None   Other Topics Concern    None   Social History Narrative    None     Social Determinants of Health     Financial Resource Strain: Unknown (2023)    Overall Financial Resource Strain (CARDIA)     Difficulty of Paying Living Expenses: Patient refused   Food Insecurity: No Food Insecurity (2023)    Hunger Vital Sign     Worried About Running Out of Food in the Last Year: Never true     Ran Out of Food in the Last Year: Never true   Transportation Needs: Unknown (2023)    PRAPARE - Transportation     Lack of Transportation (Medical): Patient refused     Lack of Transportation (Non-Medical): Patient refused   Physical Activity: Not on file   Stress: Not on file   Social Connections: Not on file   Intimate Partner Violence: Not on file   Housing Stability: [address]  (2023)    Housing Stability Vital Sign     Unable to Pay for Housing in the Last Year: No     Number of Places Lived in the Last Year: 1     Unstable Housing in the Last Year: No       Subjective         Objective    Physical Exam:   Assessment Type: (P) Assess only  General Appearance: (P) Alert, Awake  Respiratory Pattern: (P) Dyspnea with exertion  Chest Assessment: (P) Chest expansion symmetrical  Bilateral Breath Sounds: (P) Coarse, Expiratory wheezes  Cough: (P) Non-productive  O2 Device: (P) nc    Vitals:  Blood pressure 95/65, pulse 104, temperature 97.8 °F (36.6 °C), resp.  rate 20, weight 82.9 kg (182 lb 12.2 oz), SpO2 96 %. Imaging and other studies: I have personally reviewed pertinent reports. O2 Device: (P) nc     Plan    Respiratory Plan: (P) Home Bronchodilator Patient pathway  Airway Clearance Plan: (P) Discontinue Protocol     Resp Comments: (P) Pt. admitted for pneumonia. Hx of COPD. Pt uses nebs multiple times a day at home. He wears 3L O2 and cpap at HS. Will continue current nebulizer home regimen at this time.

## 2023-10-18 NOTE — PLAN OF CARE
Problem: MOBILITY - ADULT  Goal: Maintain or return to baseline ADL function  Description: INTERVENTIONS:  -  Assess patient's ability to carry out ADLs; assess patient's baseline for ADL function and identify physical deficits which impact ability to perform ADLs (bathing, care of mouth/teeth, toileting, grooming, dressing, etc.)  - Assess/evaluate cause of self-care deficits   - Assess range of motion  - Assess patient's mobility; develop plan if impaired  - Assess patient's need for assistive devices and provide as appropriate  - Encourage maximum independence but intervene and supervise when necessary  - Involve family in performance of ADLs  - Assess for home care needs following discharge   - Consider OT consult to assist with ADL evaluation and planning for discharge  - Provide patient education as appropriate  Outcome: Progressing  Goal: Maintains/Returns to pre admission functional level  Description: INTERVENTIONS:  - Perform BMAT or MOVE assessment daily.   - Set and communicate daily mobility goal to care team and patient/family/caregiver. - Collaborate with rehabilitation services on mobility goals if consulted  - Perform Range of Motion 3 times a day. - Reposition patient every 2 hours.   - Dangle patient 3 times a day  - Stand patient 3 times a day  - Ambulate patient 3 times a day  - Out of bed to chair 3 times a day   - Out of bed for meals 3 times a day  - Out of bed for toileting  - Record patient progress and toleration of activity level   Outcome: Progressing     Problem: PAIN - ADULT  Goal: Verbalizes/displays adequate comfort level or baseline comfort level  Description: Interventions:  - Encourage patient to monitor pain and request assistance  - Assess pain using appropriate pain scale  - Administer analgesics based on type and severity of pain and evaluate response  - Implement non-pharmacological measures as appropriate and evaluate response  - Consider cultural and social influences on pain and pain management  - Notify physician/advanced practitioner if interventions unsuccessful or patient reports new pain  Outcome: Progressing     Problem: INFECTION - ADULT  Goal: Absence or prevention of progression during hospitalization  Description: INTERVENTIONS:  - Assess and monitor for signs and symptoms of infection  - Monitor lab/diagnostic results  - Monitor all insertion sites, i.e. indwelling lines, tubes, and drains  - Monitor endotracheal if appropriate and nasal secretions for changes in amount and color  - Offerle appropriate cooling/warming therapies per order  - Administer medications as ordered  - Instruct and encourage patient and family to use good hand hygiene technique  - Identify and instruct in appropriate isolation precautions for identified infection/condition  Outcome: Progressing  Goal: Absence of fever/infection during neutropenic period  Description: INTERVENTIONS:  - Monitor WBC    Outcome: Progressing     Problem: SAFETY ADULT  Goal: Maintain or return to baseline ADL function  Description: INTERVENTIONS:  -  Assess patient's ability to carry out ADLs; assess patient's baseline for ADL function and identify physical deficits which impact ability to perform ADLs (bathing, care of mouth/teeth, toileting, grooming, dressing, etc.)  - Assess/evaluate cause of self-care deficits   - Assess range of motion  - Assess patient's mobility; develop plan if impaired  - Assess patient's need for assistive devices and provide as appropriate  - Encourage maximum independence but intervene and supervise when necessary  - Involve family in performance of ADLs  - Assess for home care needs following discharge   - Consider OT consult to assist with ADL evaluation and planning for discharge  - Provide patient education as appropriate  Outcome: Progressing  Goal: Maintains/Returns to pre admission functional level  Description: INTERVENTIONS:  - Perform BMAT or MOVE assessment daily.   - Set and communicate daily mobility goal to care team and patient/family/caregiver. - Collaborate with rehabilitation services on mobility goals if consulted  - Perform Range of Motion 3 times a day. - Reposition patient every 2 hours. - Dangle patient 3 times a day  - Stand patient 3 times a day  - Ambulate patient 3 times a day  - Out of bed to chair 3 times a day   - Out of bed for meals 3 times a day  - Out of bed for toileting  - Record patient progress and toleration of activity level   Outcome: Progressing  Goal: Patient will remain free of falls  Description: INTERVENTIONS:  - Educate patient/family on patient safety including physical limitations  - Instruct patient to call for assistance with activity   - Consult OT/PT to assist with strengthening/mobility   - Keep Call bell within reach  - Keep bed low and locked with side rails adjusted as appropriate  - Keep care items and personal belongings within reach  - Initiate and maintain comfort rounds  - Make Fall Risk Sign visible to staff  - Offer Toileting every 4 Hours, in advance of need  - Initiate/Maintain bed alarm  - Obtain necessary fall risk management equipment: .   - Apply yellow socks and bracelet for high fall risk patients  - Consider moving patient to room near nurses station  Outcome: Progressing     Problem: DISCHARGE PLANNING  Goal: Discharge to home or other facility with appropriate resources  Description: INTERVENTIONS:  - Identify barriers to discharge w/patient and caregiver  - Arrange for needed discharge resources and transportation as appropriate  - Identify discharge learning needs (meds, wound care, etc.)  - Arrange for interpretive services to assist at discharge as needed  - Refer to Case Management Department for coordinating discharge planning if the patient needs post-hospital services based on physician/advanced practitioner order or complex needs related to functional status, cognitive ability, or social support system  Outcome: Progressing     Problem: Knowledge Deficit  Goal: Patient/family/caregiver demonstrates understanding of disease process, treatment plan, medications, and discharge instructions  Description: Complete learning assessment and assess knowledge base.   Interventions:  - Provide teaching at level of understanding  - Provide teaching via preferred learning methods  Outcome: Progressing     Problem: Prexisting or High Potential for Compromised Skin Integrity  Goal: Skin integrity is maintained or improved  Description: INTERVENTIONS:  - Identify patients at risk for skin breakdown  - Assess and monitor skin integrity  - Assess and monitor nutrition and hydration status  - Monitor labs   - Assess for incontinence   - Turn and reposition patient  - Assist with mobility/ambulation  - Relieve pressure over bony prominences  - Avoid friction and shearing  - Provide appropriate hygiene as needed including keeping skin clean and dry  - Evaluate need for skin moisturizer/barrier cream  - Collaborate with interdisciplinary team   - Patient/family teaching  - Consider wound care consult   Outcome: Progressing

## 2023-10-18 NOTE — ASSESSMENT & PLAN NOTE
44-year-old male patient with COPD on 3 L/min of oxygen presents with increased productive cough, shortness of breath, wheezing and increased O2 requirements up to 4L NC    CXR in ER showing RLL pneumonia  Ceftriaxone and azithromycin day 1  F/U blood cultures and sputum cultures  Supportive care

## 2023-10-18 NOTE — ED ATTENDING ATTESTATION
10/18/2023  I, [address] [facility], saw and evaluated the patient. I have discussed the patient with the resident/non-physician practitioner and agree with the resident's/non-physician practitioner's findings, Plan of Care, and MDM as documented in the resident's/non-physician practitioner's note, except where noted. All available labs and Radiology studies were reviewed. I was present for key portions of any procedure(s) performed by the resident/non-physician practitioner and I was immediately available to provide assistance. At this point I agree with the current assessment done in the Emergency Department. I have conducted an independent evaluation of this patient a history and physical is as follows:    ED Course         Critical Care Time  Procedures    Very pleasant, nontoxic-appearing, 42-year-old gentleman, history of hearing impairment, chronic COPD on 3 L of nasal cannula oxygen c/o oc increasing sob over the last few days, bilateral lower lung pneumonia. Course lungs throughout. PORT score: 79, will admit. Patient with history as above presented with cough. History obtained from patient and wife    Patient was nontoxic, stable. Ambulatory. Exam as above. EKG reviewed. Labs reviewed. Independently reviewed imaging. Reviewed external records. Differential diagnosis considered. Overall presentation is consistent with pneumonia. Low suspicion for sepsis, meningitis, endocarditis, or other serious infection. Patient has normal oxygen levels and is nontoxic. Patient was treated with antibiotics with improvement in symptoms. Consideration was given for admission. This medical documentation was created using an electronic medical record system with [employer] voice recognition. Although this document has been carefully reviewed, there may still be some phonetic and typographical errors.   These errors are purely typographical and due to imperfections of the software program, do not reflect any compromise in the patient's medical care.

## 2023-10-18 NOTE — ASSESSMENT & PLAN NOTE
Continue home aspirin 81 mg daily, metoprolol 12.5 mg twice daily, and resume home Crestor 40 mg daily

## 2023-10-18 NOTE — ASSESSMENT & PLAN NOTE
Lab Results   Component Value Date    EGFR 69 10/18/2023    EGFR 53 10/02/2023    EGFR 67 09/12/2023    CREATININE 1.02 10/18/2023    CREATININE 1.27 10/02/2023    CREATININE 1.04 09/12/2023     Baseline Cr 1-1.2  At baseline

## 2023-10-18 NOTE — ASSESSMENT & PLAN NOTE
Wt Readings from Last 3 Encounters:   10/02/23 82.9 kg (182 lb 12.8 oz)   09/29/23 82.1 kg (181 lb)   09/27/23 82.6 kg (182 lb 3.2 oz)       -Transthoracic echocardiogram 8/18/2023 showing left ventricular systolic function moderately reduced estimated LVEF 35% with grade 1 diastolic dysfunction, mildly dilated and mildly reduced right ventricular systolic function with mild tricuspid regurgitation and aortic sclerosis     continue aspirin 81 mg daily, Jardiance 10 mg daily, metoprolol succinate 25 mg daily, potassium 20 mEq every 48 hours, Crestor 40 mg daily, Entresto 24-26 mg twice daily   Patient's cardiologist discontinued aldactone 9/2023 due to borderline hypotension  Will give one time lasix 40 IV   Transition to oral lasix 20 mg daily starting tomorrow

## 2023-10-18 NOTE — ASSESSMENT & PLAN NOTE
Presents with increased work of breathing wheezing and coughing  Suspect patient is in COPD exacerbation secondary to pneumonia  Received Solu-Medrol 125 mg in the ER and DuoNeb treatment  Continue Solu-Medrol 40 mg every 8 hours  Ipratropium and Xopenex nebulizers 3 times daily  Formoterol and budesonide nebulizers twice daily  Requiring 4L Nc on 3L NC at baseline

## 2023-10-18 NOTE — H&P
[id number]  H&P  Name: Isadora Miller 77 y.o. male I MRN: [id number]  Unit/Bed#: -[facility] I Date of Admission: 10/18/2023   Date of Service: 10/18/2023 I Hospital Day: 0      Assessment/Plan   * Pneumonia of right lower lobe due to infectious organism  Assessment & Plan  74-year-old male patient with COPD on 3 L/min of oxygen presents with increased productive cough, shortness of breath, wheezing and increased O2 requirements up to 4L NC    CXR in ER showing RLL pneumonia  Ceftriaxone and azithromycin day 1  F/U blood cultures and sputum cultures  Supportive care    COPD with acute exacerbation ([address])  Assessment & Plan  Presents with increased work of breathing wheezing and coughing  Suspect patient is in COPD exacerbation secondary to pneumonia  Received Solu-Medrol 125 mg in the ER and DuoNeb treatment  Continue Solu-Medrol 40 mg every 8 hours  Ipratropium and Xopenex nebulizers 3 times daily  Formoterol and budesonide nebulizers twice daily  Requiring 4L Nc on 3L NC at baseline    Chronic diastolic (congestive) heart failure (HCC)  Assessment & Plan  Wt Readings from Last 3 Encounters:   10/02/23 82.9 kg (182 lb 12.8 oz)   09/29/23 82.1 kg (181 lb)   09/27/23 82.6 kg (182 lb 3.2 oz)       -Transthoracic echocardiogram 8/18/2023 showing left ventricular systolic function moderately reduced estimated LVEF 35% with grade 1 diastolic dysfunction, mildly dilated and mildly reduced right ventricular systolic function with mild tricuspid regurgitation and aortic sclerosis     continue aspirin 81 mg daily, Jardiance 10 mg daily, metoprolol succinate 25 mg daily, potassium 20 mEq every 48 hours, Crestor 40 mg daily, Entresto 24-26 mg twice daily   Patient's cardiologist discontinued aldactone 9/2023 due to borderline hypotension  Will give one time lasix 40 IV   Transition to oral lasix 20 mg daily starting tomorrow        Chronic kidney disease, stage 3a [facility])  Assessment & Plan  Lab Results Component Value Date    EGFR 69 10/18/2023    EGFR 53 10/02/2023    EGFR 67 09/12/2023    CREATININE 1.02 10/18/2023    CREATININE 1.27 10/02/2023    CREATININE 1.04 09/12/2023     Baseline Cr 1-1.2  At baseline    LAMBERTO on CPAP  Assessment & Plan  Continue cpap qhs    Hypertension  Assessment & Plan  Continue home Toprol-XL    Coronary artery disease involving native coronary artery of native heart without angina pectoris  Assessment & Plan  Continue home aspirin 81 mg daily, metoprolol 12.5 mg twice daily, and resume home Crestor 40 mg daily           VTE Pharmacologic Prophylaxis:   Moderate Risk (Score 3-4) - Pharmacological DVT Prophylaxis Ordered: heparin. Code Status: Level 2 - DNAR: but accepts endotracheal intubation   Discussion with family: Attempted to update  (daughter) via phone. Left voicemail. Anticipated Length of Stay: Patient will be admitted on an observation basis with an anticipated length of stay of less than 2 midnights secondary to copd exacerbation/pneumonia. Total Time Spent on Date of Encounter in care of patient: 45 mins. This time was spent on one or more of the following: performing physical exam; counseling and coordination of care; obtaining or reviewing history; documenting in the medical record; reviewing/ordering tests, medications or procedures; communicating with other healthcare professionals and discussing with patient's family/caregivers. Chief Complaint: Shortness of breath/cough    History of Present Illness:  Berny Gonzalez is a 77 y.o. male with a PMH of COPD on 3 L nasal cannula, obstructive sleep apnea on CPAP at night, chronic diastolic heart failure with ejection fraction of 35%, CKD stage IIIa, CAD, hypertension, hyperlipidemia, who presents with increased shortness of breath wheezing and coughing for the last 3 to 4 days.   Patient states that he normally wears 3 L oxygen at all times and due to worsening shortness of breath he has had to increase his oxygen to 4 L. Patient states that he has been having manage/yellowish sputum production. He also admits to having substernal chest pain about 2 days ago that was sharp and intermittent associated with coughing. Patient states he is not not having any chest pain in the last 2 days. Upon arrival to the ED chest x-ray showed right lower lobe pneumonia. Patient received IV ceftriaxone, azithromycin, Solu-Medrol 125 mg IV and DuoNeb treatment with minimal improvement thus requiring admission for further medical care. Review of Systems:  Review of Systems   Constitutional:  Negative for chills and fever. HENT:  Negative for ear pain and sore throat. Eyes:  Negative for pain and visual disturbance. Respiratory:  Positive for cough, shortness of breath and wheezing. Cardiovascular:  Negative for chest pain and palpitations. Gastrointestinal:  Negative for abdominal pain and vomiting. Genitourinary:  Negative for dysuria and hematuria. Musculoskeletal:  Negative for arthralgias and back pain. Skin:  Negative for color change and rash. Neurological:  Negative for seizures and syncope. All other systems reviewed and are negative.       Past Medical and Surgical History:   Past Medical History:   Diagnosis Date    Abnormal ECG 2021 OR 2022    Alcoholism ([address]) 1984    sober 45 years    Arthritis 2008    beginning    Bilateral carotid artery stenosis     Callus     Cancer (HCC)     skin    Chronic diastolic heart failure (HCC)     Chronic ischemic heart disease     Chronic kidney disease     stage 3    Colon polyp     COPD (chronic obstructive pulmonary disease) (HCC)     Coronary artery disease     hx stents, MI, PCI    COVID 11/2021    CPAP (continuous positive airway pressure) dependence     Disease of thyroid gland 2017    nodules    Emphysema of lung ([address]) 1995    started    Hearing loss     History of transfusion 1995    Hyperlipidemia     Hypertension     Intracranial aneurysm 04/25/2023    Lung nodule 2023    x rays    MI (myocardial infarction) ([address]) 1995    Myocardial infarction ([address]) 1995    Pneumonia     Pneumothorax 02/20/2023    collapsed lung    Prostate cancer ([address])     RSV (respiratory syncytial virus infection) 10/2022    S/P carotid endarterectomy     Shortness of breath     O2 2 l/nc PRN    Sleep apnea     Sleep apnea, obstructive     Stented coronary artery        Past Surgical History:   Procedure Laterality Date    APPENDECTOMY      CARDIAC CATHETERIZATION  03/18/2021    left main with no significant disease, proximal LAD with 10% stenosis at the site of prior stent, left circumflex artery with minimal luminal irregularities mid RCA with 50% stenosis at site of prior stent and PL segment with distal disease supplied by collaterals from the distal circumflex with no significant CAD requiring revascularization at that time.     CATARACT EXTRACTION, BILATERAL Bilateral     COLONOSCOPY      CORONARY ANGIOPLASTY WITH STENT PLACEMENT  1999    RCA    CORONARY ANGIOPLASTY WITH STENT PLACEMENT  2001    RCA    CORONARY ANGIOPLASTY WITH STENT PLACEMENT  2003    LAD    EYE SURGERY      VT BX PROSTATE STRTCTC SATURATION SAMPLING IMG GID N/A 09/13/2022    Procedure: TRANSPERINEAL MRI FUSION  BIOPSY PROSTATE;  Surgeon: Raphael Mina MD;  Location: [facility];  Service: Urology    VT NEUROPLASTY &/TRANSPOS MEDIAN NRV CARPAL [place]w Left 07/22/2022    Procedure: RELEASE CARPAL TUNNEL;  Surgeon: Susan Bardley MD;  Location: [facility];  Service: Orthopedics    VT NEUROPLASTY &/TRANSPOSITION ULNAR NERVE ELBOW Left 07/22/2022    Procedure: RELEASE CUBITAL TUNNEL;  Surgeon: Susan Bradley MD;  Location: [facility];  Service: Orthopedics    VT NEUROPLASTY &/TRANSPOSITION ULNAR NERVE WRIST Left 07/22/2022    Procedure: Rachael Sinks RELEASE;  Surgeon: Susan Bradley MD;  Location: [facility];  Service: Orthopedics    SKIN CANCER EXCISION  2012    chin-per pt, basal cell  also right ankle    TONSILLECTOMY  no       Meds/Allergies:  Prior to Admission medications    Medication Sig Start Date End Date Taking?  Authorizing Provider   aspirin 81 mg chewable tablet 81 mg daily at bedtime    Historical Provider, MD   azithromycin (ZITHROMAX) 250 mg tablet Take 500 mg by mouth 3 (three) times a week Monday, Wednesday, Friday    Historical Provider, MD   budesonide (PULMICORT) 0.5 mg/2 mL nebulizer solution Take 2 mL (0.5 mg total) by nebulization 2 (two) times a day Rinse mouth after use. 7/19/23   Julien Olivares DO   cholecalciferol (VITAMIN D3) 1,000 units tablet Take 1,000 Units by mouth daily    Historical Provider, MD   dextromethorphan-guaiFENesin (ROBITUSSIN DM)  mg/5 mL syrup Take 5 mL by mouth 3 (three) times a day as needed for cough 2/13/23   Manny Steele PA-C   Empagliflozin (Jardiance) 10 MG TABS tablet Take 1 tablet (10 mg total) by mouth every morning 9/6/23 2/27/25  Ken Pyle MD   famotidine (PEPCID) 20 mg tablet TAKE 1 TABLET BY MOUTH DAILY AT  BEDTIME 6/23/23   Julien Olivares DO   formoterol (PERFOROMIST) 20 MCG/2ML nebulizer solution Take 2 mL (20 mcg total) by nebulization 2 (two) times a day 7/4/23   Julien Olivares DO   furosemide (LASIX) 40 mg tablet Take 0.5 tablets (20 mg total) by mouth daily 9/6/23   Ken Pyle MD   gabapentin (NEURONTIN) 300 mg capsule Take 1 capsule (300 mg total) by mouth daily at bedtime 5/3/23   Julien Olivares DO   HYDROcodone-acetaminophen (Norco) 5-325 mg per tablet Take 1 tablet by mouth every 6 (six) hours as needed for pain Max Daily Amount: 4 tablets 6/30/23   Julien Olivares DO   ipratropium-albuterol (DUO-NEB) 0.5-2.5 mg/3 mL nebulizer solution  8/14/23   Historical Provider, MD   methocarbamol (ROBAXIN) 500 mg tablet Take 1 tablet (500 mg total) by mouth 3 (three) times a day as needed for muscle spasms 7/23/23   Julien Olivares DO   metoprolol succinate (TOPROL-XL) 25 mg 24 hr tablet Take 1 tablet (25 mg total) by mouth daily 9/27/23 3/20/25  Reggie Cruz MD   naloxone [facility]) 4 mg/0.1 mL nasal spray Administer 1 spray into a nostril. If no response after 2-3 minutes, give another dose in the other nostril using a new spray. 23  Bryan Eller DO   omeprazole (PriLOSEC) 20 mg delayed release capsule Take 1 capsule (20 mg total) by mouth daily 23   Ellie Gamboa MD   oxygen gas Inhale 2 L/min continuous 2LPM at rest and 3-4 LPM with activity per D Cedric FANG notes    Historical Provider, MD   potassium chloride (K-DUR,KLOR-CON) 20 mEq tablet Take 1 tablet (20 mEq total) by mouth every other day 9/27/23 3/20/25  Reggie Cruz MD   predniSONE 20 mg tablet Take 2 tablets (40 mg total) by mouth daily 10/2/23   Liz Lopez DO   rosuvastatin (CRESTOR) 40 MG tablet TAKE 1 TABLET DAILY  Patient taking differently: Take 40 mg by mouth daily 22   Bryan Eller DO   sacubitril-valsartan Clarnce Deacon) 24-26 MG TABS Take 1 tablet by mouth 2 (two) times a day 9/27/23 3/20/25  Reggie Cruz MD   Ascorbic Acid (vitamin C) 1000 MG tablet Take 1,000 mg by mouth daily  Patient not taking: No sig reported  10/15/22  Historical Provider, MD     I have reviewed home medications with patient personally. Allergies:    Allergies   Allergen Reactions    Lisinopril Swelling and Cough    Tetanus Antitoxin Anaphylaxis    Tetanus Toxoid Anaphylaxis and Swelling       Social History:  Marital Status: /Civil Union   Occupation: retired  Patient Pre-hospital Living Situation: Home  Patient Pre-hospital Level of Mobility: unable to be assessed at time of evaluation  Patient Pre-hospital Diet Restrictions: none  Substance Use History:   Social History     Substance and Sexual Activity   Alcohol Use Not Currently     Social History     Tobacco Use   Smoking Status Former    Packs/day: 2.00    Years: 35.00    Total pack years: 70.00    Types: Cigarettes    Start date: 1960    Quit date: 1995    Years since quittin.3 Smokeless Tobacco Never   Tobacco Comments    stopped smoking the day i had a heart attack     Social History     Substance and Sexual Activity   Drug Use Never       Family History:  Family History   Problem Relation Age of Onset    Heart attack Mother     Dementia Mother     Heart failure Mother          2006    Heart disease Mother         heart attacks (2)    No Known Problems Father        Physical Exam:     Vitals:   Blood Pressure: 113/58 (10/18/23 1345)  Pulse: 100 (10/18/23 1345)  Temperature: (!) 97.2 °F (36.2 °C) (10/18/23 1144)  Temp Source: Temporal (10/18/23 1144)  Respirations: 14 (10/18/23 1345)  SpO2: 96 % (10/18/23 1345)    Physical Exam  Vitals and nursing note reviewed. Constitutional:       General: He is not in acute distress. Appearance: He is well-developed. HENT:      Head: Normocephalic and atraumatic. Eyes:      Conjunctiva/sclera: Conjunctivae normal.   Cardiovascular:      Rate and Rhythm: Normal rate and regular rhythm. Heart sounds: No murmur heard. Pulmonary:      Effort: Pulmonary effort is normal.      Breath sounds: Wheezing present. Comments: Decreased breath sounds bilaterally  Abdominal:      Palpations: Abdomen is soft. Tenderness: There is no abdominal tenderness. Musculoskeletal:      Cervical back: Neck supple. Comments: Trace bilateral lower extremity edema   Skin:     General: Skin is warm and dry. Capillary Refill: Capillary refill takes less than 2 seconds. Neurological:      Mental Status: He is alert. Mental status is at baseline.    Psychiatric:         Mood and Affect: Mood normal.          Additional Data:     Lab Results:  Results from last 7 days   Lab Units 10/18/23  1210   WBC Thousand/uL 6.77   HEMOGLOBIN g/dL 13.6   HEMATOCRIT % 44.0   PLATELETS Thousands/uL 140*   NEUTROS PCT % 75   LYMPHS PCT % 13*   MONOS PCT % 8   EOS PCT % 4     Results from last 7 days   Lab Units 10/18/23  1210   SODIUM mmol/L 143   POTASSIUM mmol/L 3.1*   CHLORIDE mmol/L 105   CO2 mmol/L 31   BUN mg/dL 13   CREATININE mg/dL 1.02   ANION GAP mmol/L 7   CALCIUM mg/dL 8.6   ALBUMIN g/dL 3.6   TOTAL BILIRUBIN mg/dL 0.66   ALK PHOS U/L 53   ALT U/L 20   AST U/L 22   GLUCOSE RANDOM mg/dL 164*                 Results from last 7 days   Lab Units 10/18/23  1324 10/18/23  1210   LACTIC ACID mmol/L 1.7  --    PROCALCITONIN ng/ml  --  0.10       Lines/Drains:  Invasive Devices       Peripheral Intravenous Line  Duration             Peripheral IV 10/18/23 Left Antecubital <1 day    Peripheral IV 10/18/23 Right Antecubital <1 day                        Imaging: Reviewed radiology reports from this admission including: chest xray  XR chest 1 view portable   ED Interpretation by Sreedhar Esqueda PA-C (10/18 8264)   Abnormal   Right lower lobe consolidation      Final Result by Author Delores MD (10/18 6228)      New bilateral lower lung pneumonia. Resident: Maryjo Sánchez, the attending radiologist, have reviewed the images and agree with the final report above. Workstation performed: [id number]             EKG and Other Studies Reviewed on Admission:   EKG: Sinus Tachycardia. . ** Please Note: This note has been constructed using a voice recognition system.  **

## 2023-10-18 NOTE — NUTRITION
"   05/30/23 1520   Biochemical Data,Medical Tests, and Procedures   Biochemical Data/Medical Tests/Procedures Lab values reviewed; Meds reviewed   Labs (Comment) 5/30/23  Ca 8 1   Meds (Comment) atorvastatin, pepcid, potassium chloride   Nutrition-Focused Physical Exam   Nutrition-Focused Physical Exam Findings RN skin assessment reviewed; No edema documented; No skin issues documented   Nutrition-Focused Physical Exam Findings hearing loss and has device to aide   Medical-Related Concerns CHF, acute renal failure on stage 3 CKD, GERD, LAMBERTO on CPAP, prostate cancer not on treatment   Current PO Intake   Current Diet Order Regular diet, NTL   Current Meal Intake Adequate   Estimated calorie intake compared to estimated need Nutrient needs are met  PES Statement   Problem No nutrition diagnosis   Recommendations/Interventions   Malnutrition/BMI Present No   Summary ST consulted  Pt is known to this writer  Presents with abnormal outpatient renal labs  Past medical history significant for CHF, acute renal failure on stage 3 CKD, GERD, LAMBERTO on CPAP, prostate cancer not on treatment  Weight history reviewed - noted weight decreasing over past year  Significant 13# weight loss in 1 month, 6 7% body weight  Pt with hearing loss, noted to have deafness per chart review  No pressure areas  Ordered for Regular diet, NTL  Meal intakes documented at %  Pt reports having a good appetite  Pt states he has been \"chewing a lot\" which has been \"slowing me down  \" Pt states he is no longer becoming short of breath as often  Pt is doing NTL at home  Swallows pills with water, sips only  Pt states he is not as hungry and \"gets full quicker  \" Weight loss likely in setting of decreased PO intake however likely attributed to advancing age  Nutrient needs are met  Pt is agreeable to Mightyshake to compliment PO intake at meals  No nutrition diagnosis  RD to follow     Interventions/Recommendations Continue current diet order;Supplement " initiate   Education Assessment   Education Education not indicated at this time Enbrel Pregnancy And Lactation Text: This medication is Pregnancy Category B and is considered safe during pregnancy. It is unknown if this medication is excreted in breast milk.

## 2023-10-18 NOTE — ED PROVIDER NOTES
History  Chief Complaint   Patient presents with    Chest Pain     Chest pain x 1 weeks and sob since last night. Usually wears 3 L but per wife he has been putting oxygen up to 4L    Shortness of Breath            This is a 24-year-old male patient with chronic COPD normally wears 3 L/min as of approximately 4 days ago he is required 4 L/min does not feel short of breath. He also developed a cough that is productive. Denies any fever chills headache blurred vision double vision sore throat. No chest pain does have his ongoing shortness of breath. No nausea vomiting diarrhea abdominal pain no pleuritic pain. No calf tenderness no leg swelling no urinary symptoms. Differential diagnose includes not limited to exacerbation COPD, pneumonia, COVID, flu, CHF, PE unlikely        Prior to Admission Medications   Prescriptions Last Dose Informant Patient Reported? Taking? Empagliflozin (Jardiance) 10 MG TABS tablet  Self No No   Sig: Take 1 tablet (10 mg total) by mouth every morning   HYDROcodone-acetaminophen (Norco) 5-325 mg per tablet  Spouse/Significant Other, Self No No   Sig: Take 1 tablet by mouth every 6 (six) hours as needed for pain Max Daily Amount: 4 tablets   aspirin 81 mg chewable tablet  Spouse/Significant Other, Self Yes No   Si mg daily at bedtime   azithromycin (ZITHROMAX) 250 mg tablet  Spouse/Significant Other, Self Yes No   Sig: Take 500 mg by mouth 3 (three) times a week Monday, Wednesday, Friday   budesonide (PULMICORT) 0.5 mg/2 mL nebulizer solution  Spouse/Significant Other, Self No No   Sig: Take 2 mL (0.5 mg total) by nebulization 2 (two) times a day Rinse mouth after use.    cholecalciferol (VITAMIN D3) 1,000 units tablet   Yes No   Sig: Take 1,000 Units by mouth daily   dextromethorphan-guaiFENesin (ROBITUSSIN DM)  mg/5 mL syrup  Spouse/Significant Other, Self No No   Sig: Take 5 mL by mouth 3 (three) times a day as needed for cough   famotidine (PEPCID) 20 mg tablet Spouse/Significant Other, Self No No   Sig: TAKE 1 TABLET BY MOUTH DAILY AT  BEDTIME   formoterol (PERFOROMIST) 20 MCG/2ML nebulizer solution  Spouse/Significant Other, Self No No   Sig: Take 2 mL (20 mcg total) by nebulization 2 (two) times a day   furosemide (LASIX) 40 mg tablet  Self No No   Sig: Take 0.5 tablets (20 mg total) by mouth daily   gabapentin (NEURONTIN) 300 mg capsule  Spouse/Significant Other, Self No No   Sig: Take 1 capsule (300 mg total) by mouth daily at bedtime   ipratropium-albuterol (DUO-NEB) 0.5-2.5 mg/3 mL nebulizer solution  Self Yes No   methocarbamol (ROBAXIN) 500 mg tablet  Spouse/Significant Other, Self No No   Sig: Take 1 tablet (500 mg total) by mouth 3 (three) times a day as needed for muscle spasms   metoprolol succinate (TOPROL-XL) 25 mg 24 hr tablet   No No   Sig: Take 1 tablet (25 mg total) by mouth daily   naloxone (NARCAN) 4 mg/0.1 mL nasal spray  Spouse/Significant Other, Self No No   Sig: Administer 1 spray into a nostril. If no response after 2-3 minutes, give another dose in the other nostril using a new spray.    omeprazole (PriLOSEC) 20 mg delayed release capsule  Self No No   Sig: Take 1 capsule (20 mg total) by mouth daily   oxygen gas  Spouse/Significant Other, Self Yes No   Sig: Inhale 2 L/min continuous 2LPM at rest and 3-4 LPM with activity per D Cedric FANG notes   potassium chloride (K-DUR,KLOR-CON) 20 mEq tablet   No No   Sig: Take 1 tablet (20 mEq total) by mouth every other day   predniSONE 20 mg tablet   No No   Sig: Take 2 tablets (40 mg total) by mouth daily   rosuvastatin (CRESTOR) 40 MG tablet  Spouse/Significant Other, Self No No   Sig: TAKE 1 TABLET DAILY   Patient taking differently: Take 40 mg by mouth daily   sacubitril-valsartan (Entresto) 24-26 MG TABS   No No   Sig: Take 1 tablet by mouth 2 (two) times a day      Facility-Administered Medications: None       Past Medical History:   Diagnosis Date    Abnormal ECG 2021 OR 2022    Alcoholism ([address]) 1984 sober 38 years    Arthritis 2008    beginning    Bilateral carotid artery stenosis     Callus     Cancer (HCC)     skin    Chronic diastolic heart failure (HCC)     Chronic ischemic heart disease     Chronic kidney disease     stage 3    Colon polyp     COPD (chronic obstructive pulmonary disease) (HCC)     Coronary artery disease     hx stents, MI, PCI    COVID 11/2021    CPAP (continuous positive airway pressure) dependence     Disease of thyroid gland 2017    nodules    Emphysema of lung ([address]) 1995    started    Hearing loss     History of transfusion 1995    Hyperlipidemia     Hypertension     Intracranial aneurysm 04/25/2023    Lung nodule 2023    x rays    MI (myocardial infarction) ([address]) 1995    Myocardial infarction ([address]) 1995    Pneumonia     Pneumothorax 02/20/2023    collapsed lung    Prostate cancer ([address])     RSV (respiratory syncytial virus infection) 10/2022    S/P carotid endarterectomy     Shortness of breath     O2 2 l/nc PRN    Sleep apnea     Sleep apnea, obstructive     Stented coronary artery        Past Surgical History:   Procedure Laterality Date    APPENDECTOMY      CARDIAC CATHETERIZATION  03/18/2021    left main with no significant disease, proximal LAD with 10% stenosis at the site of prior stent, left circumflex artery with minimal luminal irregularities mid RCA with 50% stenosis at site of prior stent and PL segment with distal disease supplied by collaterals from the distal circumflex with no significant CAD requiring revascularization at that time.     CATARACT EXTRACTION, BILATERAL Bilateral     COLONOSCOPY      CORONARY ANGIOPLASTY WITH STENT PLACEMENT  1999    RCA    CORONARY ANGIOPLASTY WITH STENT PLACEMENT  2001    RCA    CORONARY ANGIOPLASTY WITH STENT PLACEMENT  2003    LAD    EYE SURGERY      UT BX PROSTATE STRTCTC SATURATION SAMPLING IMG GID N/A 09/13/2022    Procedure: TRANSPERINEAL MRI FUSION  BIOPSY PROSTATE;  Surgeon: Shamar Miles MD;  Location: [facility];  Service: Urology    TX NEUROPLASTY &/TRANSPOS MEDIAN NRV CARPAL TUNNE Left 2022    Procedure: RELEASE CARPAL TUNNEL;  Surgeon: Galilea Drew MD;  Location: [facility];  Service: Orthopedics    TX NEUROPLASTY &/TRANSPOSITION ULNAR NERVE ELBOW Left 2022    Procedure: RELEASE CUBITAL TUNNEL;  Surgeon: Galilea Drew MD;  Location: [facility];  Service: Orthopedics    TX NEUROPLASTY &/TRANSPOSITION ULNAR NERVE WRIST Left 2022    Procedure: Rebekah Heart;  Surgeon: Galilea Drew MD;  Location: [facility];  Service: Orthopedics    SKIN CANCER EXCISION  2012    chin-per pt, basal cell  also right ankle    TONSILLECTOMY  no       Family History   Problem Relation Age of Onset    Heart attack Mother     Dementia Mother     Heart failure Mother          2006    Heart disease Mother         heart attacks (2)    No Known Problems Father      I have reviewed and agree with the history as documented. E-Cigarette/Vaping    E-Cigarette Use Never User      E-Cigarette/Vaping Substances    Nicotine No     THC No     CBD No     Flavoring No     Other No     Unknown No      Social History     Tobacco Use    Smoking status: Former     Packs/day: 2.00     Years: 35.00     Total pack years: 70.00     Types: Cigarettes     Start date: 1960     Quit date: 1995     Years since quittin.3    Smokeless tobacco: Never    Tobacco comments:     stopped smoking the day i had a heart attack   Vaping Use    Vaping Use: Never used   Substance Use Topics    Alcohol use: Not Currently    Drug use: Never       Review of Systems   Constitutional:  Negative for chills, diaphoresis, fatigue and fever. HENT:  Negative for congestion, ear pain, nosebleeds and sore throat. Eyes:  Negative for photophobia, pain, discharge and visual disturbance. Respiratory:  Positive for cough and shortness of breath. Negative for choking, chest tightness and wheezing.     Cardiovascular:  Negative for chest pain and palpitations. Gastrointestinal:  Negative for abdominal distention, abdominal pain, diarrhea and vomiting. Genitourinary:  Negative for dysuria, flank pain, frequency and hematuria. Musculoskeletal:  Negative for arthralgias, back pain, gait problem and joint swelling. Skin:  Negative for color change and rash. Neurological:  Negative for dizziness, seizures, syncope and headaches. Psychiatric/Behavioral:  Negative for behavioral problems and confusion. The patient is not nervous/anxious. All other systems reviewed and are negative. Physical Exam  Physical Exam  Vitals and nursing note reviewed. Constitutional:       General: He is not in acute distress. Appearance: He is well-developed. He is not ill-appearing, toxic-appearing or diaphoretic. HENT:      Head: Normocephalic and atraumatic. Right Ear: Tympanic membrane, ear canal and external ear normal.      Left Ear: Tympanic membrane, ear canal and external ear normal.      Nose: Nose normal.      Mouth/Throat:      Mouth: Mucous membranes are moist.      Pharynx: Oropharynx is clear. No oropharyngeal exudate or posterior oropharyngeal erythema. Eyes:      General: No scleral icterus. Right eye: No discharge. Left eye: No discharge. Conjunctiva/sclera: Conjunctivae normal.      Pupils: Pupils are equal, round, and reactive to light. Cardiovascular:      Rate and Rhythm: Normal rate and regular rhythm. Pulmonary:      Effort: Pulmonary effort is normal. No respiratory distress. Breath sounds: No stridor. Wheezing and rhonchi present. No rales. Chest:      Chest wall: No tenderness. Abdominal:      General: Bowel sounds are normal.      Palpations: Abdomen is soft. Tenderness: There is no abdominal tenderness. Musculoskeletal:         General: Normal range of motion. Cervical back: Normal range of motion and neck supple. Right lower leg: No edema. Left lower leg: No edema. Skin:     General: Skin is warm. Capillary Refill: Capillary refill takes less than 2 seconds. Neurological:      General: No focal deficit present. Mental Status: He is alert and oriented to person, place, and time. Mental status is at baseline.    Psychiatric:         Mood and Affect: Mood normal.         Behavior: Behavior normal.         Vital Signs  ED Triage Vitals [10/18/23 1144]   Temperature Pulse Respirations Blood Pressure SpO2   (!) 97.2 °F (36.2 °C) 105 22 109/54 94 %      Temp Source Heart Rate Source Patient Position - Orthostatic VS BP Location FiO2 (%)   Temporal Monitor Sitting Left arm --      Pain Score       --           Vitals:    10/18/23 1144 10/18/23 1300 10/18/23 1345 10/18/23 1501   BP: 109/54 126/58 113/58 95/65   Pulse: 105 98 100 104   Patient Position - Orthostatic VS: Sitting            Visual Acuity      ED Medications  Medications   acetaminophen (TYLENOL) tablet 650 mg (has no administration in time range)   guaiFENesin (MUCINEX) 12 hr tablet 1,200 mg (has no administration in time range)   heparin (porcine) subcutaneous injection 5,000 Units (5,000 Units Subcutaneous Given 10/18/23 1516)   benzonatate (TESSALON PERLES) capsule 100 mg (has no administration in time range)   levalbuterol (XOPENEX) inhalation solution 1.25 mg (has no administration in time range)     And   sodium chloride 0.9 % inhalation solution 3 mL (has no administration in time range)   ipratropium (ATROVENT) 0.02 % inhalation solution 0.5 mg (has no administration in time range)   budesonide (PULMICORT) inhalation solution 0.5 mg (has no administration in time range)   formoterol (PERFOROMIST) nebulizer solution 20 mcg (has no administration in time range)   methylPREDNISolone sodium succinate (Solu-MEDROL) injection 40 mg (has no administration in time range)   azithromycin (ZITHROMAX) 500 mg in sodium chloride 0.9 % 250 mL IVPB (has no administration in time range)   cefTRIAXone (ROCEPHIN) IVPB (premix in dextrose) 1,000 mg 50 mL (has no administration in time range)   aspirin chewable tablet 81 mg (has no administration in time range)   famotidine (PEPCID) tablet 20 mg (has no administration in time range)   gabapentin (NEURONTIN) capsule 300 mg (has no administration in time range)   metoprolol succinate (TOPROL-XL) 24 hr tablet 25 mg (25 mg Oral Not Given 10/18/23 1510)   pantoprazole (PROTONIX) EC tablet 20 mg (has no administration in time range)   atorvastatin (LIPITOR) tablet 80 mg (has no administration in time range)   sacubitril-valsartan (ENTRESTO) 24-26 MG per tablet 1 tablet (has no administration in time range)   furosemide (LASIX) injection 40 mg (40 mg Intravenous Not Given 10/18/23 1506)   furosemide (LASIX) tablet 20 mg (has no administration in time range)   magnesium sulfate 2 g/50 mL IVPB (premix) 2 g (has no administration in time range)   potassium chloride (K-DUR,KLOR-CON) CR tablet 40 mEq (has no administration in time range)   albuterol inhalation solution 5 mg (5 mg Nebulization Given 10/18/23 1226)   ipratropium (ATROVENT) 0.02 % inhalation solution 0.5 mg (0.5 mg Nebulization Given 10/18/23 1226)   methylPREDNISolone sodium succinate (Solu-MEDROL) injection 125 mg (125 mg Intravenous Given 10/18/23 1226)   potassium chloride (K-DUR,KLOR-CON) CR tablet 40 mEq (40 mEq Oral Given 10/18/23 1250)   potassium chloride 20 mEq IVPB (premix) (20 mEq Intravenous New Bag 10/18/23 1308)   cefTRIAXone (ROCEPHIN) IVPB (premix in dextrose) 1,000 mg 50 mL (0 mg Intravenous Stopped 10/18/23 1423)   azithromycin (ZITHROMAX) 500 mg in sodium chloride 0.9 % 250 mL IVPB (500 mg Intravenous New Bag 10/18/23 1423)       Diagnostic Studies  Results Reviewed       Procedure Component Value Units Date/Time    Procalcitonin [[id number]]  (Normal) Collected: 10/18/23 1210    Lab Status: Final result Specimen: Blood from Arm, Left Updated: 10/18/23 1439     Procalcitonin 0.10 ng/ml     Vitamin B12 [[id number]] Collected: 10/18/23 1210    Lab Status: In process Specimen: Blood from Arm, Left Updated: 10/18/23 1434    Folate [[id number]] Collected: 10/18/23 1210    Lab Status: In process Specimen: Blood from Arm, Left Updated: 10/18/23 1434    Sputum culture and Gram stain [[id number]]     Lab Status: No result Specimen: Sputum     Strep Pneumoniae, Urine [[id number]]     Lab Status: No result Specimen: Urine     Legionella antigen, Urine [[id number]]     Lab Status: No result Specimen: Urine     Lactic acid, plasma (w/reflex if result > 2.0) [[id number]]  (Normal) Collected: 10/18/23 1324    Lab Status: Final result Specimen: Blood from Arm, Left Updated: 10/18/23 1346     LACTIC ACID 1.7 mmol/L     Narrative:      Result may be elevated if tourniquet was used during collection. Blood culture #1 [[id number]] Collected: 10/18/23 1323    Lab Status: In process Specimen: Blood from Arm, Right Updated: 10/18/23 1327    Blood culture #2 [[id number]] Collected: 10/18/23 1324    Lab Status: In process Specimen: Blood from Arm, Left Updated: 10/18/23 1327    FLU/RSV/COVID - if FLU/RSV clinically relevant [[id number]]  (Normal) Collected: 10/18/23 1226    Lab Status: Final result Specimen: Nares from Nose Updated: 10/18/23 1311     SARS-CoV-2 Negative     INFLUENZA A PCR Negative     INFLUENZA B PCR Negative     RSV PCR Negative    Narrative:      FOR PEDIATRIC PATIENTS - copy/paste COVID Guidelines URL to browser: [email or web address]    SARS-CoV-2 assay is a Nucleic Acid Amplification assay intended for the  qualitative detection of nucleic acid from SARS-CoV-2 in nasopharyngeal  swabs. Results are for the presumptive identification of SARS-CoV-2 RNA. Positive results are indicative of infection with SARS-CoV-2, the virus  causing COVID-19, but do not rule out bacterial infection or co-infection  with other viruses.  Laboratories within the [facility] and its  territories are required to report all positive results to the appropriate  public health authorities. Negative results do not preclude SARS-CoV-2  infection and should not be used as the sole basis for treatment or other  patient management decisions. Negative results must be combined with  clinical observations, patient history, and epidemiological information. This test has not been FDA cleared or approved. This test has been authorized by FDA under an Emergency Use Authorization  (EUA). This test is only authorized for the duration of time the  declaration that circumstances exist justifying the authorization of the  emergency use of an in vitro diagnostic tests for detection of SARS-CoV-2  virus and/or diagnosis of COVID-19 infection under section 564(b)(1) of  the Act, 21 U. S.C. 579IFM-3(T)(2), unless the authorization is terminated  or revoked sooner. The test has been validated but independent review by FDA  and CLIA is pending. Test performed using [employer] GeneXpert: This RT-PCR assay targets N2,  a region unique to SARS-CoV-2. A conserved region in the E-gene was chosen  for pan-Sarbecovirus detection which includes SARS-CoV-2. According to CMS-2020-01-R, this platform meets the definition of high-throughput technology.     B-Type Natriuretic Peptide(BNP) [[id number]]  (Abnormal) Collected: 10/18/23 1210    Lab Status: Final result Specimen: Blood from Arm, Left Updated: 10/18/23 1245      pg/mL     HS Troponin I 2hr [[id number]]     Lab Status: No result Specimen: Blood     HS Troponin 0hr (reflex protocol) [[id number]]  (Normal) Collected: 10/18/23 1210    Lab Status: Final result Specimen: Blood from Arm, Left Updated: 10/18/23 1241     hs TnI 0hr 26 ng/L     Comprehensive metabolic panel [[id number]]  (Abnormal) Collected: 10/18/23 1210    Lab Status: Final result Specimen: Blood from Arm, Left Updated: 10/18/23 1234     Sodium 143 mmol/L      Potassium 3.1 mmol/L      Chloride 105 mmol/L      CO2 31 mmol/L      ANION GAP 7 mmol/L      BUN 13 mg/dL      Creatinine 1.02 mg/dL      Glucose 164 mg/dL      Calcium 8.6 mg/dL      AST 22 U/L      ALT 20 U/L      Alkaline Phosphatase 53 U/L      Total Protein 5.4 g/dL      Albumin 3.6 g/dL      Total Bilirubin 0.66 mg/dL      eGFR 69 ml/min/1.73sq m     Narrative:      [facility] guidelines for Chronic Kidney Disease (CKD):     Stage 1 with normal or high GFR (GFR > 90 mL/min/1.73 square meters)    Stage 2 Mild CKD (GFR = 60-89 mL/min/1.73 square meters)    Stage 3A Moderate CKD (GFR = 45-59 mL/min/1.73 square meters)    Stage 3B Moderate CKD (GFR = 30-44 mL/min/1.73 square meters)    Stage 4 Severe CKD (GFR = 15-29 mL/min/1.73 square meters)    Stage 5 End Stage CKD (GFR <15 mL/min/1.73 square meters)  Note: GFR calculation is accurate only with a steady state creatinine    CBC and differential [[id number]]  (Abnormal) Collected: 10/18/23 1210    Lab Status: Final result Specimen: Blood from Arm, Left Updated: 10/18/23 1218     WBC 6.77 Thousand/uL      RBC 4.44 Million/uL      Hemoglobin 13.6 g/dL      Hematocrit 44.0 %      MCV 99 fL      MCH 30.6 pg      MCHC 30.9 g/dL      RDW 15.5 %      MPV 11.0 fL      Platelets 034 Thousands/uL      nRBC 0 /100 WBCs      Neutrophils Relative 75 %      Immat GRANS % 0 %      Lymphocytes Relative 13 %      Monocytes Relative 8 %      Eosinophils Relative 4 %      Basophils Relative 0 %      Neutrophils Absolute 5.10 Thousands/µL      Immature Grans Absolute 0.02 Thousand/uL      Lymphocytes Absolute 0.85 Thousands/µL      Monocytes Absolute 0.53 Thousand/µL      Eosinophils Absolute 0.24 Thousand/µL      Basophils Absolute 0.03 Thousands/µL                    XR chest 1 view portable   ED Interpretation by Ramiro Guajardo PA-C (10/18 0159)   Abnormal   Right lower lobe consolidation      Final Result by Saba Delacruz MD (10/18 6003)      New bilateral lower lung pneumonia. Resident: Lydia Aparicio, the attending radiologist, have reviewed the images and agree with the final report above. Workstation performed: [id number]                    Procedures  Procedures         ED Course  ED Course as of 10/18/23 1542   Wed Oct 18, 2023   1404 Initial EKG interpreted by me 105 bpm sinus tachycardia with infrequent unifocal PVCs no ST elevation normal axis QTc 448 similar to previous                               SBIRT 20yo+      Flowsheet Row Most Recent Value   Initial Alcohol Screen: US AUDIT-C     1. How often do you have a drink containing alcohol? 0 Filed at: 10/18/2023 1144   2. How many drinks containing alcohol do you have on a typical day you are drinking? 0 Filed at: 10/18/2023 1144   3a. Male UNDER 65: How often do you have five or more drinks on one occasion? 0 Filed at: 10/18/2023 1144   3b. FEMALE Any Age, or MALE 65+: How often do you have 4 or more drinks on one occassion? 0 Filed at: 10/18/2023 1144   Audit-C Score 0 Filed at: 10/18/2023 1144   CRISTOPHER: How many times in the past year have you. .. Used an illegal drug or used a prescription medication for non-medical reasons? Never Filed at: 10/18/2023 1144                      Medical Decision Making  79-year-old male patient who normally is on 3 L/min of oxygen for COPD started requiring 4 L/min he was getting short of breath and developed a productive cough. No chest pain. No fevers.   Nothing makes it better or worse differential diagnose includes not limited to pneumonia, COVID, RSV, COPD, influenza, CHF    Problems Addressed:  Hypokalemia: acute illness or injury     Details: Patient was found to have a potassium of 3.1 before beginning albuterol I did replenish calcium  Hypoxia:     Details: Acute on chronic hypoxia was given 4 L/min and is no longer hypoxic also given nebulizer DuoNeb  Pneumonia: acute illness or injury     Details: Given Rocephin and Zithromax also admission    Amount and/or Complexity of Data Reviewed  External Data Reviewed: radiology and notes. Details: Did review previous notes and previous x-rays for comparison and past medical history  Labs: ordered. Decision-making details documented in ED Course. Details: All reviewed 3.1 potassium which was replenished no other acute findings that required intervention  Radiology: ordered and independent interpretation performed. Details: I personally interpreted the chest x-ray found a right lower lobe consolidation possibly pneumonia  ECG/medicine tests: ordered and independent interpretation performed. Decision-making details documented in ED Course. Details: I reviewed and interpreted EKG there is no ST elevation please see chart for remainder of rate  Discussion of management or test interpretation with external provider(s): Using joint decision-making with patient and Dr. Shannan Wade patient will be admitted to observation for pneumonia and hypoxia    Risk  Prescription drug management. Decision regarding hospitalization. Disposition  Final diagnoses:   Pneumonia   Hypoxia   Hypokalemia     Time reflects when diagnosis was documented in both MDM as applicable and the Disposition within this note       Time User Action Codes Description Comment    10/18/2023  2:08 PM Edd Brooke Add [J18.9] Pneumonia     10/18/2023  2:09 PM Edd Brooke Add [R09.02] Hypoxia     10/18/2023  2:09 PM Edd Brooke Add [E87.6] Hypokalemia           ED Disposition       ED Disposition   Admit    Condition   Stable    Date/Time   Wed Oct 18, 2023 7308    Comment   Case was discussed with Dr. Shin Conley and the patient's admission status was agreed to be Admission Status: observation status to the service of Dr. Shin Conley .                Follow-up Information    None         Current Discharge Medication List        CONTINUE these medications which have NOT CHANGED    Details   aspirin 81 mg chewable tablet 81 mg daily at bedtime azithromycin (ZITHROMAX) 250 mg tablet Take 500 mg by mouth 3 (three) times a week Monday, Wednesday, Friday      budesonide (PULMICORT) 0.5 mg/2 mL nebulizer solution Take 2 mL (0.5 mg total) by nebulization 2 (two) times a day Rinse mouth after use. Qty: 180 mL, Refills: 0    Associated Diagnoses: Chronic obstructive pulmonary disease, unspecified COPD type ([address])      cholecalciferol (VITAMIN D3) 1,000 units tablet Take 1,000 Units by mouth daily      dextromethorphan-guaiFENesin (ROBITUSSIN DM)  mg/5 mL syrup Take 5 mL by mouth 3 (three) times a day as needed for cough  Qty: 354 mL, Refills: 0    Associated Diagnoses: COPD with exacerbation ([facility])      Empagliflozin (Jardiance) 10 MG TABS tablet Take 1 tablet (10 mg total) by mouth every morning  Qty: 90 tablet, Refills: 5    Associated Diagnoses: HFrEF (heart failure with reduced ejection fraction) ([facility])      famotidine (PEPCID) 20 mg tablet TAKE 1 TABLET BY MOUTH DAILY AT  BEDTIME  Qty: 100 tablet, Refills: 2    Comments: Requesting 1 year supply  Associated Diagnoses: Gastroesophageal reflux disease, unspecified whether esophagitis present      formoterol (PERFOROMIST) 20 MCG/2ML nebulizer solution Take 2 mL (20 mcg total) by nebulization 2 (two) times a day  Qty: 180 mL, Refills: 5    Associated Diagnoses: Chronic obstructive pulmonary disease, unspecified COPD type (HCC)      furosemide (LASIX) 40 mg tablet Take 0.5 tablets (20 mg total) by mouth daily  Qty: 90 tablet, Refills: 2    Associated Diagnoses: Chronic ischemic heart disease      gabapentin (NEURONTIN) 300 mg capsule Take 1 capsule (300 mg total) by mouth daily at bedtime    Associated Diagnoses: Chronic pain syndrome; Intercostal neuralgia;  Chronic right-sided thoracic back pain      HYDROcodone-acetaminophen (Norco) 5-325 mg per tablet Take 1 tablet by mouth every 6 (six) hours as needed for pain Max Daily Amount: 4 tablets  Qty: 15 tablet, Refills: 0    Associated Diagnoses: Costochondral chest pain      ipratropium-albuterol (DUO-NEB) 0.5-2.5 mg/3 mL nebulizer solution       methocarbamol (ROBAXIN) 500 mg tablet Take 1 tablet (500 mg total) by mouth 3 (three) times a day as needed for muscle spasms  Qty: 30 tablet, Refills: 0    Associated Diagnoses: Chronic right-sided thoracic back pain      metoprolol succinate (TOPROL-XL) 25 mg 24 hr tablet Take 1 tablet (25 mg total) by mouth daily  Qty: 90 tablet, Refills: 5    Associated Diagnoses: Chest pain, unspecified type      naloxone (NARCAN) 4 mg/0.1 mL nasal spray Administer 1 spray into a nostril. If no response after 2-3 minutes, give another dose in the other nostril using a new spray. Qty: 1 each, Refills: 1    Associated Diagnoses: Costochondral chest pain      omeprazole (PriLOSEC) 20 mg delayed release capsule Take 1 capsule (20 mg total) by mouth daily  Qty: 28 capsule, Refills: 0    Associated Diagnoses: Atypical chest pain; Gastroesophageal reflux disease, unspecified whether esophagitis present      oxygen gas Inhale 2 L/min continuous 2LPM at rest and 3-4 LPM with activity per D Cedric FANG notes      potassium chloride (K-DUR,KLOR-CON) 20 mEq tablet Take 1 tablet (20 mEq total) by mouth every other day  Qty: 45 tablet, Refills: 5    Associated Diagnoses: Hypokalemia      predniSONE 20 mg tablet Take 2 tablets (40 mg total) by mouth daily  Qty: 10 tablet, Refills: 0    Associated Diagnoses: COPD exacerbation (HCC)      rosuvastatin (CRESTOR) 40 MG tablet TAKE 1 TABLET DAILY  Qty: 100 tablet, Refills: 3    Associated Diagnoses: Hyperlipidemia, unspecified hyperlipidemia type      sacubitril-valsartan (Entresto) 24-26 MG TABS Take 1 tablet by mouth 2 (two) times a day  Qty: 180 tablet, Refills: 5    Associated Diagnoses: HFrEF (heart failure with reduced ejection fraction) (HCC)             No discharge procedures on file.     PDMP Review         Value Time User    PDMP Reviewed  Yes 6/30/2023  5:21 PM Lynda Douglas DO ED Provider  Electronically Signed by             Nena Montes PA-C  10/18/23 [address] [facility], [place]  10/18/23 0779

## 2023-10-19 LAB
ALBUMIN SERPL BCP-MCNC: 3.6 G/DL (ref 3.5–5)
ANION GAP SERPL CALCULATED.3IONS-SCNC: 10 MMOL/L
ATRIAL RATE: 106 BPM
BUN SERPL-MCNC: 16 MG/DL (ref 5–25)
CALCIUM SERPL-MCNC: 8.2 MG/DL (ref 8.4–10.2)
CHLORIDE SERPL-SCNC: 105 MMOL/L (ref 96–108)
CO2 SERPL-SCNC: 24 MMOL/L (ref 21–32)
CREAT SERPL-MCNC: 0.98 MG/DL (ref 0.6–1.3)
ERYTHROCYTE [DISTWIDTH] IN BLOOD BY AUTOMATED COUNT: 15.5 % (ref 11.6–15.1)
GFR SERPL CREATININE-BSD FRML MDRD: 73 ML/MIN/1.73SQ M
GLUCOSE P FAST SERPL-MCNC: 136 MG/DL (ref 65–99)
GLUCOSE SERPL-MCNC: 136 MG/DL (ref 65–140)
HCT VFR BLD AUTO: 44.7 % (ref 36.5–49.3)
HGB BLD-MCNC: 13.9 G/DL (ref 12–17)
L PNEUMO1 AG UR QL IA.RAPID: NEGATIVE
MAGNESIUM SERPL-MCNC: 2.5 MG/DL (ref 1.9–2.7)
MCH RBC QN AUTO: 30.5 PG (ref 26.8–34.3)
MCHC RBC AUTO-ENTMCNC: 31.1 G/DL (ref 31.4–37.4)
MCV RBC AUTO: 98 FL (ref 82–98)
P AXIS: 60 DEGREES
PHOSPHATE SERPL-MCNC: 3.1 MG/DL (ref 2.3–4.1)
PLATELET # BLD AUTO: 145 THOUSANDS/UL (ref 149–390)
PMV BLD AUTO: 11.1 FL (ref 8.9–12.7)
POTASSIUM SERPL-SCNC: 4.6 MMOL/L (ref 3.5–5.3)
PR INTERVAL: 132 MS
PROCALCITONIN SERPL-MCNC: 0.09 NG/ML
QRS AXIS: 78 DEGREES
QRSD INTERVAL: 92 MS
QT INTERVAL: 338 MS
QTC INTERVAL: 448 MS
RBC # BLD AUTO: 4.55 MILLION/UL (ref 3.88–5.62)
S PNEUM AG UR QL: NEGATIVE
SODIUM SERPL-SCNC: 139 MMOL/L (ref 135–147)
T WAVE AXIS: 267 DEGREES
VENTRICULAR RATE: 106 BPM
WBC # BLD AUTO: 9.17 THOUSAND/UL (ref 4.31–10.16)

## 2023-10-19 PROCEDURE — 94664 DEMO&/EVAL PT USE INHALER: CPT

## 2023-10-19 PROCEDURE — 99233 SBSQ HOSP IP/OBS HIGH 50: CPT | Performed by: STUDENT IN AN ORGANIZED HEALTH CARE EDUCATION/TRAINING PROGRAM

## 2023-10-19 PROCEDURE — 83735 ASSAY OF MAGNESIUM: CPT | Performed by: STUDENT IN AN ORGANIZED HEALTH CARE EDUCATION/TRAINING PROGRAM

## 2023-10-19 PROCEDURE — 85027 COMPLETE CBC AUTOMATED: CPT | Performed by: STUDENT IN AN ORGANIZED HEALTH CARE EDUCATION/TRAINING PROGRAM

## 2023-10-19 PROCEDURE — 94640 AIRWAY INHALATION TREATMENT: CPT

## 2023-10-19 PROCEDURE — 84145 PROCALCITONIN (PCT): CPT | Performed by: STUDENT IN AN ORGANIZED HEALTH CARE EDUCATION/TRAINING PROGRAM

## 2023-10-19 PROCEDURE — 92610 EVALUATE SWALLOWING FUNCTION: CPT

## 2023-10-19 PROCEDURE — 94760 N-INVAS EAR/PLS OXIMETRY 1: CPT

## 2023-10-19 PROCEDURE — 97166 OT EVAL MOD COMPLEX 45 MIN: CPT

## 2023-10-19 PROCEDURE — 80069 RENAL FUNCTION PANEL: CPT | Performed by: STUDENT IN AN ORGANIZED HEALTH CARE EDUCATION/TRAINING PROGRAM

## 2023-10-19 PROCEDURE — 93010 ELECTROCARDIOGRAM REPORT: CPT | Performed by: INTERNAL MEDICINE

## 2023-10-19 PROCEDURE — 97163 PT EVAL HIGH COMPLEX 45 MIN: CPT

## 2023-10-19 RX ORDER — AZITHROMYCIN 250 MG/1
500 TABLET, FILM COATED ORAL EVERY 24 HOURS
Status: COMPLETED | OUTPATIENT
Start: 2023-10-19 | End: 2023-10-20

## 2023-10-19 RX ORDER — CYANOCOBALAMIN 1000 UG/ML
1000 INJECTION, SOLUTION INTRAMUSCULAR; SUBCUTANEOUS
Status: DISCONTINUED | OUTPATIENT
Start: 2023-10-19 | End: 2023-10-27 | Stop reason: HOSPADM

## 2023-10-19 RX ORDER — MIDODRINE HYDROCHLORIDE 5 MG/1
10 TABLET ORAL
Status: COMPLETED | OUTPATIENT
Start: 2023-10-19 | End: 2023-10-19

## 2023-10-19 RX ORDER — FUROSEMIDE 10 MG/ML
40 INJECTION INTRAMUSCULAR; INTRAVENOUS ONCE
Status: COMPLETED | OUTPATIENT
Start: 2023-10-19 | End: 2023-10-19

## 2023-10-19 RX ORDER — METOPROLOL SUCCINATE 25 MG/1
25 TABLET, EXTENDED RELEASE ORAL DAILY
Status: DISCONTINUED | OUTPATIENT
Start: 2023-10-19 | End: 2023-10-20

## 2023-10-19 RX ORDER — ALBUMIN (HUMAN) 12.5 G/50ML
25 SOLUTION INTRAVENOUS ONCE
Status: COMPLETED | OUTPATIENT
Start: 2023-10-19 | End: 2023-10-19

## 2023-10-19 RX ADMIN — LEVALBUTEROL HYDROCHLORIDE 1.25 MG: 1.25 SOLUTION RESPIRATORY (INHALATION) at 20:24

## 2023-10-19 RX ADMIN — MIDODRINE HYDROCHLORIDE 10 MG: 5 TABLET ORAL at 12:24

## 2023-10-19 RX ADMIN — LEVALBUTEROL HYDROCHLORIDE 1.25 MG: 1.25 SOLUTION RESPIRATORY (INHALATION) at 07:16

## 2023-10-19 RX ADMIN — CYANOCOBALAMIN 1000 MCG: 1000 INJECTION, SOLUTION INTRAMUSCULAR; SUBCUTANEOUS at 08:32

## 2023-10-19 RX ADMIN — GUAIFENESIN 1200 MG: 600 TABLET ORAL at 17:06

## 2023-10-19 RX ADMIN — MIDODRINE HYDROCHLORIDE 10 MG: 5 TABLET ORAL at 08:30

## 2023-10-19 RX ADMIN — FAMOTIDINE 20 MG: 20 TABLET, FILM COATED ORAL at 21:37

## 2023-10-19 RX ADMIN — METHYLPREDNISOLONE SODIUM SUCCINATE 40 MG: 40 INJECTION, POWDER, FOR SOLUTION INTRAMUSCULAR; INTRAVENOUS at 05:41

## 2023-10-19 RX ADMIN — IPRATROPIUM BROMIDE 0.5 MG: 0.5 SOLUTION RESPIRATORY (INHALATION) at 07:16

## 2023-10-19 RX ADMIN — METHYLPREDNISOLONE SODIUM SUCCINATE 40 MG: 40 INJECTION, POWDER, FOR SOLUTION INTRAMUSCULAR; INTRAVENOUS at 21:37

## 2023-10-19 RX ADMIN — HEPARIN SODIUM 5000 UNITS: 5000 INJECTION INTRAVENOUS; SUBCUTANEOUS at 21:39

## 2023-10-19 RX ADMIN — LEVALBUTEROL HYDROCHLORIDE 1.25 MG: 1.25 SOLUTION RESPIRATORY (INHALATION) at 13:22

## 2023-10-19 RX ADMIN — IPRATROPIUM BROMIDE 0.5 MG: 0.5 SOLUTION RESPIRATORY (INHALATION) at 20:24

## 2023-10-19 RX ADMIN — METHYLPREDNISOLONE SODIUM SUCCINATE 40 MG: 40 INJECTION, POWDER, FOR SOLUTION INTRAMUSCULAR; INTRAVENOUS at 12:24

## 2023-10-19 RX ADMIN — FORMOTEROL FUMARATE DIHYDRATE 20 MCG: 20 SOLUTION RESPIRATORY (INHALATION) at 07:46

## 2023-10-19 RX ADMIN — ATORVASTATIN CALCIUM 80 MG: 80 TABLET, FILM COATED ORAL at 17:06

## 2023-10-19 RX ADMIN — GUAIFENESIN 1200 MG: 600 TABLET ORAL at 08:31

## 2023-10-19 RX ADMIN — GABAPENTIN 300 MG: 300 CAPSULE ORAL at 21:37

## 2023-10-19 RX ADMIN — METOPROLOL SUCCINATE 25 MG: 25 TABLET, FILM COATED, EXTENDED RELEASE ORAL at 08:27

## 2023-10-19 RX ADMIN — FUROSEMIDE 40 MG: 10 INJECTION, SOLUTION INTRAMUSCULAR; INTRAVENOUS at 10:41

## 2023-10-19 RX ADMIN — PANTOPRAZOLE SODIUM 20 MG: 20 TABLET, DELAYED RELEASE ORAL at 05:42

## 2023-10-19 RX ADMIN — BUDESONIDE 0.5 MG: 0.5 INHALANT ORAL at 20:24

## 2023-10-19 RX ADMIN — AZITHROMYCIN DIHYDRATE 500 MG: 250 TABLET ORAL at 14:14

## 2023-10-19 RX ADMIN — HEPARIN SODIUM 5000 UNITS: 5000 INJECTION INTRAVENOUS; SUBCUTANEOUS at 05:42

## 2023-10-19 RX ADMIN — IPRATROPIUM BROMIDE 0.5 MG: 0.5 SOLUTION RESPIRATORY (INHALATION) at 13:23

## 2023-10-19 RX ADMIN — ALBUMIN (HUMAN) 25 G: 0.25 INJECTION, SOLUTION INTRAVENOUS at 08:32

## 2023-10-19 RX ADMIN — SACUBITRIL AND VALSARTAN 1 TABLET: 24; 26 TABLET, FILM COATED ORAL at 08:31

## 2023-10-19 RX ADMIN — ASPIRIN 81 MG CHEWABLE TABLET 81 MG: 81 TABLET CHEWABLE at 21:37

## 2023-10-19 RX ADMIN — FORMOTEROL FUMARATE DIHYDRATE 20 MCG: 20 SOLUTION RESPIRATORY (INHALATION) at 20:55

## 2023-10-19 RX ADMIN — HEPARIN SODIUM 5000 UNITS: 5000 INJECTION INTRAVENOUS; SUBCUTANEOUS at 14:14

## 2023-10-19 RX ADMIN — BUDESONIDE 0.5 MG: 0.5 INHALANT ORAL at 07:35

## 2023-10-19 NOTE — ASSESSMENT & PLAN NOTE
Chronic respiratory failure with hypoxia due to chronic COPD a/e/b need for continuous O2, normally at 3 LPM, on 4 LPM when admitted  Back to baseline O2 requirements

## 2023-10-19 NOTE — ASSESSMENT & PLAN NOTE
Lab Results   Component Value Date    EGFR 73 10/19/2023    EGFR 69 10/18/2023    EGFR 53 10/02/2023    CREATININE 0.98 10/19/2023    CREATININE 1.02 10/18/2023    CREATININE 1.27 10/02/2023     Baseline Cr 1-1.2  At baseline

## 2023-10-19 NOTE — CASE MANAGEMENT
Case Management Assessment & Discharge Planning Note    Patient name Maria G Delacruz  Location /-01 MRN [id number]  : 1945 Date 10/19/2023       Current Admission Date: 10/18/2023  Current Admission Diagnosis:Pneumonia of right lower lobe due to infectious organism   Patient Active Problem List    Diagnosis Date Noted    Erectile dysfunction 2023    Other disorders of refraction 2023    Occlusion and stenosis of unspecified carotid artery 2023    Dysphagia 2023    Congestive heart failure with left ventricular systolic dysfunction (LVSD) ([address]) 2023    Hypoxemia 2023    Intracranial aneurysm 2023    Allergic rhinitis, unspecified 2023    Chronic kidney disease, stage 3a ([address]) 2023    Generalized muscle weakness 2023    Hypertensive heart and chronic kidney disease with heart failure and stage 1 through stage 4 chronic kidney disease, or unspecified chronic kidney disease ([address]) 2023    Need for assistance with personal care 2023    Other abnormalities of gait and mobility 2023    Retention of urine, unspecified 2023    Sudden idiopathic hearing loss, right ear 2023    Acute and chronic respiratory failure with hypoxia ([address]) 2023    Elevated troponin 2023    Orthopnea 2023    Lower extremity edema 2023    Irregular heart rhythm 2023    Pneumonia of right lower lobe due to infectious organism 03/15/2023    Leukocytosis 2023    Left leg pain 2023    Transient right leg weakness 2023    Hypokalemia 2023    Ambulatory dysfunction 2023    COPD (chronic obstructive pulmonary disease) ([address]) 2023    Sensorineural hearing loss, bilateral 10/19/2022    Chronic respiratory failure with hypoxia ([address]) 10/15/2022    Prostate cancer ([address]) 2022    Elevated MCV 2022    Cubital tunnel syndrome on left 2022    Carpal tunnel syndrome on left 07/22/2022    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    Urinary frequency 04/27/2022    Incomplete bladder emptying 04/27/2022    Chronic bilateral low back pain with right-sided sciatica 04/18/2022    Mid back pain 04/18/2022    Thoracic radiculopathy 04/18/2022    Chronic pain syndrome 04/18/2022    Hoarseness or changing voice 04/14/2022    Chronic right-sided thoracic back pain 03/05/2022    Primary insomnia 03/02/2022    Right hip pain 01/20/2022    Abnormal nuclear stress test 03/18/2021    Costochondral chest pain 01/15/2021    COPD with acute exacerbation ([address]) 01/11/2021    Oxygen dependent 01/11/2021    S/P carotid endarterectomy 01/11/2021    Stented coronary artery 01/11/2021    Vertigo 01/11/2021    Anisometropia 01/11/2021    Osteoarthritis of spinal facet joint 01/11/2021    Chronic ischemic heart disease 01/11/2021    Chronic diastolic (congestive) heart failure ([address]) 01/11/2021    Diverticular disease of colon 01/11/2021    Dry eye syndrome 01/11/2021    GERD (gastroesophageal reflux disease) 01/11/2021    Acute hearing loss, right 01/11/2021    Elevated PSA 01/11/2021    Hypoxia 01/11/2021    Lens replaced by other means 01/11/2021    Lumbar radiculopathy 01/11/2021    Multinodular goiter 01/11/2021    Nuclear senile cataract 01/11/2021    Obesity 01/11/2021    Occlusion and stenosis of carotid artery 01/11/2021    Osteoarthritis of hip 01/11/2021    Prediabetes 01/11/2021    LAMBERTO on CPAP 01/11/2021    Solitary pulmonary nodule 01/11/2021    Thyroid nodule 01/11/2021    Guyon syndrome, 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 [facility] F[place] [address]  Phone: [phone] Fax: [phone] [address] [facility], [address]  [address] [facility] [address]  Phone: [phone] Fax: [phone]    Primary Care Provider: Michelle Warren DO    Primary Insurance: [facility]  Secondary Insurance:     ASSESSMENT:  [address] [place], [address] Representative - Spouse   Primary Phone: [phone] (Mobile)                 Advance Directives  Does patient have a [address]?: Yes  Does patient have Advance Directives?: Yes  Advance Directives: Power of  for health care, Living will  Primary Contact: Isela Sharp - Daughter         Readmission Root Cause  30 Day Readmission: No    Patient Information  Admitted from[de-identified] Home  Mental Status: Alert  During Assessment patient was accompanied by: Not accompanied during assessment  Assessment information provided by[de-identified] Patient  Primary Caregiver: Self  Support Systems: Spouse/significant other, [address] of Residence: [facility] do you live in?: [address] [facility] entry access options.  Select all that apply.: Stairs  Number of steps to enter home.: 3  Do the steps have railings?: Yes  Type of Current Residence: Other (Comment) (1 story home)  In the last 12 months, was there a time when you were not able to pay the mortgage or rent on time?: No  In the last 12 months, how many places have you lived?: 1  In the last 12 months, was there a time when you did not have a steady place to sleep or slept in a shelter (including now)?: No  Homeless/housing insecurity resource given?: N/A  Living Arrangements: Lives w/ Spouse/significant other  Is patient a ?: Yes ([employer])  Is patient active with [facility])?: No    Activities of Daily Living Prior to Admission  Functional Status: Independent  Completes ADLs independently?: Yes  Ambulates independently?: No  Level of ambulatory dependence: Assistance  Does patient use assisted devices?: Yes  Assisted Devices (DME) used: Walker, Portable Oxygen concentrator, Portable Oxygen tanks  DME Company Name (respiratory supplies): [employer]  O2 Rate(s):  Adapt  Does patient currently own DME?: Yes  Does patient have a history of Outpatient Therapy (PT/OT)?: Yes  Does the patient have a history of Short-Term Rehab?: Yes [facility]  Does patient have a history of HHC?: Yes ([facility])  Does patient currently have [facility]?: No         Patient Information Continued  Income Source: Pension/group home  Does patient have prescription coverage?: Yes  Within the past 12 months, you worried that your food would run out before you got the money to buy more.: Never true  Within the past 12 months, the food you bought just didn't last and you didn't have money to get more.: Never true  Food insecurity resource given?: N/A  Does patient receive dialysis treatments?: No  Does patient have a history of substance abuse?: No  Does patient have a history of Mental Health Diagnosis?: No         Means of Transportation  Means of Transport to Appts[de-identified] Family transport  In the past 12 months, has lack of transportation kept you from medical appointments or from getting medications?: No  In the past 12 months, has lack of transportation kept you from meetings, work, or from getting things needed for daily living?: No  Was application for public transport provided?: N/A        DISCHARGE DETAILS:    Discharge planning discussed with[de-identified] Patient  Freedom of Choice: Yes     CM contacted family/caregiver?: Yes  Were Treatment Team discharge recommendations reviewed with patient/caregiver?: Yes  Did patient/caregiver verbalize understanding of patient care needs?: Yes  Were patient/caregiver advised of the risks associated with not following Treatment Team discharge recommendations?: Yes    Contacts  Patient Contacts: Greyson Prakash  Relationship to Patient[de-identified] Family  Contact Method: Phone  Phone Number: [phone]  Reason/Outcome: Discharge [address]         Is the patient interested in [address] at discharge?: No    DME Referral Provided  Referral made for DME?: No    Other Referral/Resources/Interventions Provided:  Interventions: Outpatient PT    Would you like to participate in our [facility] service program?  : No - Declined    Treatment Team Recommendation: Home (Resume OP PT)  Discharge Destination Plan[de-identified] Home (Resume OP PT)  Transport at Discharge : Family              Additional Comments: Pt recommended to return home and resume OP pulmonary rehab. Pt aware and in agreement. No other identified CM discharge needs at this time. CM to follow.

## 2023-10-19 NOTE — PLAN OF CARE
Problem: OCCUPATIONAL THERAPY ADULT  Goal: Performs self-care activities at highest level of function for planned discharge setting. See evaluation for individualized goals. Description: Treatment Interventions: ADL retraining, Functional transfer training, UE strengthening/ROM, Endurance training, Patient/family training, Compensatory technique education, Energy conservation, Activityengagement     See flowsheet documentation for full assessment, interventions and recommendations. Note: Limitation: Decreased ADL status, Decreased UE strength, Decreased Safe judgement during ADL, Decreased endurance  Prognosis: Good  Assessment: Pt is a 77 y.o. male seen for OT evaluation s/p admit to [facility] on 10/18/2023 w/ SOB. Comorbidities affecting pt's functional performance at time of assessment include: CAD, HTN, COPD, CHF, LAMBERTO, vertigo. Personal factors affecting pt at time of IE include:difficulty performing ADLS, difficulty performing IADLS , limited insight into deficits, decreased initiation and engagement , and health management . Prior to admission, pt was I with ADLs and required some A with IADLs. Upon evaluation: the following deficits impact occupational performance: weakness, decreased strength, decreased balance, decreased tolerance, and decreased safety awareness. Pt to benefit from continued skilled OT tx while in the hospital to address deficits as defined above and maximize level of functional independence w ADL's and functional mobility. Occupational Performance areas to address include: grooming, bathing/shower, toilet hygiene, dressing, functional mobility, community mobility, and clothing management. From OT standpoint, recommendation at time of d/c would be home with outpatient PT services.      OT Discharge Recommendation: Home with outpatient rehabilitation     Jason Guerra MS, OTR/L

## 2023-10-19 NOTE — ASSESSMENT & PLAN NOTE
Wt Readings from Last 3 Encounters:   10/19/23 83 kg (182 lb 15.7 oz)   10/02/23 82.9 kg (182 lb 12.8 oz)   09/29/23 82.1 kg (181 lb)       -Transthoracic echocardiogram 8/18/2023 showing left ventricular systolic function moderately reduced estimated LVEF 35% with grade 1 diastolic dysfunction, mildly dilated and mildly reduced right ventricular systolic function with mild tricuspid regurgitation and aortic sclerosis     continue aspirin 81 mg daily, Jardiance 10 mg daily, metoprolol succinate 25 mg daily, potassium 20 mEq every 48 hours, Crestor 40 mg daily, Entresto 24-26 mg twice daily   Patient's cardiologist discontinued aldactone 9/2023 due to borderline hypotension  S/P Lasix 40 IV today  Transition to oral lasix 20 mg daily tomorrow

## 2023-10-19 NOTE — ASSESSMENT & PLAN NOTE
Presents with increased work of breathing wheezing and coughing  Suspect patient is in COPD exacerbation secondary to pneumonia  Received Solu-Medrol 125 mg in the ER and DuoNeb treatment  Continue Solu-Medrol 40 mg every 8 hours day 2  Azithromycin day 2 of 3  Ipratropium and Xopenex nebulizers 3 times daily  Formoterol and budesonide nebulizers twice daily  Down to home o2 requirements of 2L [place]  If continues to improve plan to d/c in 24 hours

## 2023-10-19 NOTE — OCCUPATIONAL THERAPY NOTE
Occupational Therapy Evaluation     Patient Name: Criselda SANDERS Date: 10/19/2023  Problem List  Active Problems:    Coronary artery disease involving native coronary artery of native heart without angina pectoris    Hypertension    COPD with acute exacerbation (HCC)    Chronic diastolic (congestive) heart failure (HCC)    LMABERTO on CPAP    Pneumonia of right lower lobe due to infectious organism    Chronic kidney disease, stage 3a [facility])    Past Medical History  Past Medical History:   Diagnosis Date    Abnormal ECG 2021 OR 2022    Alcoholism ([address]) 1984    sober 38 years    Arthritis 2008    beginning    Bilateral carotid artery stenosis     Callus     Cancer (HCC)     skin    Chronic diastolic heart failure (HCC)     Chronic ischemic heart disease     Chronic kidney disease     stage 3    Colon polyp     COPD (chronic obstructive pulmonary disease) (HCC)     Coronary artery disease     hx stents, MI, PCI    COVID 11/2021    CPAP (continuous positive airway pressure) dependence     Disease of thyroid gland 2017    nodules    Emphysema of lung ([address]) 1995    started    Hearing loss     History of transfusion 1995    Hyperlipidemia     Hypertension     Intracranial aneurysm 04/25/2023    Lung nodule 2023    x rays    MI (myocardial infarction) ([address]) 1995    Myocardial infarction ([address]) 1995    Pneumonia     Pneumothorax 02/20/2023    collapsed lung    Prostate cancer ([address])     RSV (respiratory syncytial virus infection) 10/2022    S/P carotid endarterectomy     Shortness of breath     O2 2 l/nc PRN    Sleep apnea     Sleep apnea, obstructive     Stented coronary artery      Past Surgical History  Past Surgical History:   Procedure Laterality Date    APPENDECTOMY      CARDIAC CATHETERIZATION  03/18/2021    left main with no significant disease, proximal LAD with 10% stenosis at the site of prior stent, left circumflex artery with minimal luminal irregularities mid RCA with 50% stenosis at site of prior stent and PL segment with distal disease supplied by collaterals from the distal circumflex with no significant CAD requiring revascularization at that time. CATARACT EXTRACTION, BILATERAL Bilateral     COLONOSCOPY      CORONARY ANGIOPLASTY WITH STENT PLACEMENT  1999    RCA    CORONARY ANGIOPLASTY WITH STENT PLACEMENT  2001    RCA    CORONARY ANGIOPLASTY WITH STENT PLACEMENT  2003    LAD    EYE SURGERY      MO BX PROSTATE STRTCTC SATURATION SAMPLING IMG GID N/A 09/13/2022    Procedure: TRANSPERINEAL MRI FUSION  BIOPSY PROSTATE;  Surgeon: Rober Smyth MD;  [facility];  Service: Urology    MO NEUROPLASTY &/TRANSPOS MEDIAN NRV CARPAL Michi Yuliya Left 07/22/2022    Procedure: RELEASE CARPAL TUNNEL;  Surgeon: Titus Coronado MD;  [facility];  Service: Orthopedics    MO NEUROPLASTY &/TRANSPOSITION ULNAR NERVE ELBOW Left 07/22/2022    Procedure: RELEASE CUBITAL TUNNEL;  Surgeon: Titus Coronado MD;  [facility];  Service: Orthopedics    MO NEUROPLASTY &/TRANSPOSITION ULNAR NERVE WRIST Left 07/22/2022    Procedure: Alanda Calk RELEASE;  Surgeon: Titus Corondao MD;  [facility];  Service: Orthopedics    SKIN CANCER EXCISION  2012    chin-per pt, basal cell  also right ankle    TONSILLECTOMY  no           10/19/23 0842   OT Last Visit   OT Visit Date 10/19/23   Note Type   Note type Evaluation   Additional Comments Pt seen as a co-eval with PT due to the patient's co-morbidities, clinically unstable presentation, and present impairments which are a regression from the patient's baseline. Pain Assessment   Pain Assessment Tool 0-10   Pain Score No Pain   Restrictions/Precautions   Weight Bearing Precautions Per Order No   Braces or Orthoses   (none reported)   Other Precautions Fall Risk;Pain;Hard of hearing;O2  (3L O2, does wear at home; very SOFYA [facility])   Home Living   Type of [address] [place] [address] Dr One level;Performs ADLs on one level; Able to live on main level with bedroom/bathroom;Stairs to enter with rails  (3 louisa)   Bathroom Shower/Tub Walk-in shower   Bathroom Toilet Standard   [address] [facility] Walker  (Pt reports utilizing RW at baseline PRN)   Prior Function   Level of [place] Independent with ADLs; Independent with functional mobility; Needs assistance with IADLS   Lives With Spouse;Daughter;Family   Receives Help From Family   IADLs Independent with driving; Independent with meal prep; Independent with medication management   Falls in the last 6 months 0  (pt denies but + fall history)   Vocational Retired   Comments Pt reports going to outpatient PT "a few times a week"   Subjective   Subjective "I am hard of hearing, you can write down what you want to say on this paper"   ADL   Where Assessed Edge of bed   UB Bathing Assistance 5  Supervision/Setup   LB Bathing Assistance 5  Supervision/Setup   UB Dressing Assistance 5  Supervision/Setup   LB Dressing Assistance 5  Supervision/Setup   LB Dressing Deficit Don/doff R sock; Don/doff L sock  (cross over leg technique)   Bed Mobility   Supine to Sit 6  Modified independent   Additional items HOB elevated   Sit to Supine   (DNT; pt seated in recliner upon conclusion of session)   Additional Comments supine BP: 96/52, sitting EOB BP: 97/50, BP post transfer to bedside chair 95/57; SpO2 86-94% on 3L O2 via NC throughout session. Pt cued in pursed lip breathing after activity to address SpO2 desat. Pt demonstrated good understanding   Transfers   Sit to Stand 5  Supervision   Additional items Assist x 1; Increased time required;Verbal cues   Stand to Sit 5  Supervision   Additional items Assist x 1; Increased time required;Verbal cues   Stand pivot 5  Supervision   Additional items Assist x 1; Increased time required;Verbal cues   Additional Comments RW used; denied lightheadedness/dizziness during transitions   Balance   Static Sitting Good   Dynamic Sitting Fair +   Static Standing Fair Dynamic Standing Fair   Ambulatory Fair   Activity Tolerance   Activity Tolerance Patient limited by fatigue  (CHACON, SOB)   Medical Staff Made Aware Yes, CM made aware of d/c recs   Nurse Made Aware Yes, nursing staff made aware of session outcomes   RUE Assessment   RUE Assessment WFL   RUE Strength   RUE Overall Strength   (3+/5)   LUE Assessment   LUE Assessment WFL   LUE Strength   LUE Overall Strength   (3+/5)   Sensation   Light Touch No apparent deficits   Vision-Basic Assessment   Current Vision Wears glasses all the time   Psychosocial   Psychosocial (WDL) WDL   Cognition   Overall Cognitive Status WFL   Arousal/Participation Alert; Responsive; Cooperative   Attention Within functional limits   Orientation Level Oriented X4   Memory Decreased recall of precautions;Decreased recall of recent events   Following Commands Follows all commands and directions without difficulty   Comments Pt agreeable to OT evaluation, pleasant   Assessment   Limitation Decreased ADL status; Decreased UE strength;Decreased Safe judgement during ADL;Decreased endurance   Prognosis Good   Assessment Pt is a 77 y.o. male seen for OT evaluation s/p admit to [facility] on 10/18/2023 w/ SOB. Comorbidities affecting pt's functional performance at time of assessment include: CAD, HTN, COPD, CHF, LAMBERTO, vertigo. Personal factors affecting pt at time of IE include:difficulty performing ADLS, difficulty performing IADLS , limited insight into deficits, decreased initiation and engagement , and health management . Prior to admission, pt was I with ADLs and required some A with IADLs. Upon evaluation: the following deficits impact occupational performance: weakness, decreased strength, decreased balance, decreased tolerance, and decreased safety awareness. Pt to benefit from continued skilled OT tx while in the hospital to address deficits as defined above and maximize level of functional independence w ADL's and functional mobility. Occupational Performance areas to address include: grooming, bathing/shower, toilet hygiene, dressing, functional mobility, community mobility, and clothing management. From OT standpoint, recommendation at time of d/c would be home with outpatient PT services. Goals   Patient Goals to go home   Plan   Treatment Interventions ADL retraining;Functional transfer training;UE strengthening/ROM; Endurance training;Patient/family training; Compensatory technique education; Energy conservation; Activityengagement   Goal Expiration Date 10/29/23   OT Treatment Day 0   OT Frequency 3-5x/wk   Discharge Recommendation   OT Discharge Recommendation Home with outpatient rehabilitation   Additional Comments  The patient's raw score on the AM-PAC Daily Activity inpatient short form is 21, standardized score is 44.27, greater than 39.4. Patients at this level are likely to benefit from discharge to home. Please refer to the recommendation of the Occupational Therapist for safe discharge planning.    [facility]   Lower Body Dressing 3   Bathing 3   Toileting 3   Upper Body Dressing 4   Grooming 4   Eating 4   Daily Activity Raw Score 21   Daily Activity Standardized Score (Calc for Raw Score >=11) 44.27   -PAC Applied Cognition Inpatient   Following a Speech/Presentation 4   Understanding Ordinary Conversation 4   Taking Medications 3   Remembering Where Things Are Placed or Put Away 3   Remembering List of 4-5 Errands 3   Taking Care of Complicated Tasks 2   Applied Cognition Raw Score 19   Applied Cognition Standardized Score 39.77     GOALS:    Pt will achieve the following within specified time frame: LTG  Pt will achieve the following goals within 10 days    *ADL transfers with (I) for inc'd independence with ADLs/purposeful tasks    *UB ADL with (I) for inc'd independence with self cares    *LB ADL with (I) using AE prn for inc'd independence with self cares    *Toileting with (I) for clothing management and hygiene for return to PLOF with personal care    *Increase static stand balance and dyn stand balance to G for inc'd safety with standing purposeful tasks    *Increase stand tolerance x7 m for inc'd tolerance with standing purposeful tasks    *Bed mobility- (I) for inc'd independence to manage own comfort and initiate EOB & OOB purposeful tasks    *Participate in 10-15m UE therex to increase overall stamina/activity tolerance for purposeful tasks      Brittanie Mendoza MS, OTR/L

## 2023-10-19 NOTE — SPEECH THERAPY NOTE
Speech Language/Pathology  Speech/Language Pathology  Assessment    Patient Name: Jany Payne  HGJHZ'C Date: 10/19/2023     Problem List  Active Problems:    Coronary artery disease involving native coronary artery of native heart without angina pectoris    Hypertension    COPD with acute exacerbation (HCC)    Chronic diastolic (congestive) heart failure (HCC)    LAMBERTO on CPAP    Chronic respiratory failure with hypoxia ([address])    Pneumonia of right lower lobe due to infectious organism    Chronic kidney disease, stage 3a ([address])    Past Medical History  Past Medical History:   Diagnosis Date    Abnormal ECG 2021 OR 2022    Alcoholism ([address]) 1984    sober 38 years    Arthritis 2008    beginning    Bilateral carotid artery stenosis     Callus     Cancer (HCC)     skin    Chronic diastolic heart failure (HCC)     Chronic ischemic heart disease     Chronic kidney disease     stage 3    Colon polyp     COPD (chronic obstructive pulmonary disease) (HCC)     Coronary artery disease     hx stents, MI, PCI    COVID 11/2021    CPAP (continuous positive airway pressure) dependence     Disease of thyroid gland 2017    nodules    Emphysema of lung ([address]) 1995    started    Hearing loss     History of transfusion 1995    Hyperlipidemia     Hypertension     Intracranial aneurysm 04/25/2023    Lung nodule 2023    x rays    MI (myocardial infarction) ([address]) 1995    Myocardial infarction ([address]) 1995    Pneumonia     Pneumothorax 02/20/2023    collapsed lung    Prostate cancer ([address])     RSV (respiratory syncytial virus infection) 10/2022    S/P carotid endarterectomy     Shortness of breath     O2 2 l/nc PRN    Sleep apnea     Sleep apnea, obstructive     Stented coronary artery      Past Surgical History  Past Surgical History:   Procedure Laterality Date    APPENDECTOMY      CARDIAC CATHETERIZATION  03/18/2021    left main with no significant disease, proximal LAD with 10% stenosis at the site of prior stent, left circumflex artery with minimal luminal irregularities mid RCA with 50% stenosis at site of prior stent and PL segment with distal disease supplied by collaterals from the distal circumflex with no significant CAD requiring revascularization at that time. CATARACT EXTRACTION, BILATERAL Bilateral     COLONOSCOPY      CORONARY ANGIOPLASTY WITH STENT PLACEMENT  1999    RCA    CORONARY ANGIOPLASTY WITH STENT PLACEMENT  2001    RCA    CORONARY ANGIOPLASTY WITH STENT PLACEMENT  2003    LAD    EYE SURGERY      WA BX PROSTATE STRTCTC SATURATION SAMPLING IMG GID N/A 09/13/2022    Procedure: TRANSPERINEAL MRI FUSION  BIOPSY PROSTATE;  Surgeon: Rachell Ward MD;  [facility];  Service: Urology    WA NEUROPLASTY &/TRANSPOS MEDIAN NRV CARPAL Daune Deven Left 07/22/2022    Procedure: RELEASE CARPAL TUNNEL;  Surgeon: Raquel Calvillo MD;  [facility];  Service: Orthopedics    WA NEUROPLASTY &/TRANSPOSITION ULNAR NERVE ELBOW Left 07/22/2022    Procedure: RELEASE CUBITAL TUNNEL;  Surgeon: Raquel Calvillo MD;  [facility];  Service: Orthopedics    WA NEUROPLASTY &/TRANSPOSITION ULNAR NERVE WRIST Left 07/22/2022    Procedure: Mobile Parrish RELEASE;  Surgeon: Raquel Calvillo MD;  [facility];  Service: Orthopedics    SKIN CANCER EXCISION  2012    chin-per pt, basal cell  also right ankle    TONSILLECTOMY  no          10/19/23 1600   Patient Information   Current Medical SOB, RLL pneumonia   Special Studies CT, chest xray   Past Medical History known silent aspirator of thins   Swallow Information   Current Risks for Dysphagia & Aspiration Respiratory compromise   Current Symptoms/Concerns Cough;Clear throat;During meals; Recent Pneumonia;Current Pneumonia;Weight loss;HX Dysphagia   Current Diet Regular; Thin liquid   Baseline Diet Dyphagia mechanical soft; Nectar thick liquids   Baseline Assessment   Behavior/Cognition Alert; Cooperative; Interactive   Speech/Language Status Extremely [place]   Patient Positioning Upright in chair   Swallow Mechanism Exam   Labial Symmetry WFL   Labial Strength WFL   Labial ROM WFL   Labial Sensation WFL   Facial Symmetry WFL   Facial Strength WFL   Facial ROM WFL   Facial Sensation WFL   Lingual Symmetry WFL   Lingual Strength WFL   Lingual ROM WFL   Lingual Sensation WFL   Dentition Adequate   Volitional Cough Strong   Consistencies Assessed and Performance   Materials Admnistered Puree/Level 1;Nectar thick liquid;Mechanical Soft/Level 2; Thin liquid;Pills   Oral Stage Mild impaired   Phargngeal Stage Moderate impaired;Significant aspiration risk   Swallow Mechanics Mild delayed;Swallow initation;Weak larygneal rise;Aspiration risk   Esophageal Concerns Hx GERDS   Strategies and Efficacy slow rate, modified diet   Summary   Swallow Summary Patient received sitting upright in bedside chair. Pt cont with profound hearing loss making communication difficult without assistive device (using talk to text to ask/respond to questions). Patient reports that he adheres to mechanical soft solids and NTL at home. Pt is allowed one cup of thin coffee each morning. Strongly discouraged his "every 3 day" bottle of thin water due to the "tipping nature" of the bottle and known silent aspiration with thin. Coffee cont to be permitted as it is by cup, held in neutral position, sipped, as opposed to tipping head back/drinking from bottle in large amounts. Pt does report oral hold of thin liquids this was discouraged. Pt lacks oral strength/coordination to hold thin bolus. Safer to swallow quickly and effortfully. Pt cont to deny overt throat clear/cough. Pt demonstrated pills with thin at bedside, no overt, silent asp ongoing. We discussed further modifications to be made, becoming stricter with diet guidelines and success has been measured by hospitalizations. Pt now with pneumonia 8/13, return 8/16 with admission to hospital, now two months later RLL pneumonia again. Education provided.   Pt cont to aspirate, will eliminate drinking from bottle of water. Thin coffee in am only, no other exceptions to diet protocol. Pt verbalized his understanding. Baseline diet added including mech/nectar, ST ongoing. Recommendations   Risk for Aspiration Present   Recommendations Consider oral diet   Diet Solid Recommendation Level 2 Dysphagia/ mechanical soft/altered   Diet Liquid Recommendation Nectar thick liquid   Recommended Form of Meds Crushed; With puree   General Precautions Aspiration precautions; Feed only when alert;Minimize distractions;Upright as possible for all oral intake;Remain upright for 45 mins after meals   Compensatory Swallowing Strategies Alternate solids and liquids; No straws   Further Evaluations Dietician   Results Reviewed with PAC/CRNP;RN;PT/Family/Caregiver   Speech Therapy Prognosis   Prognosis Good   Prognosis Considerations Co-Morbidities

## 2023-10-19 NOTE — UTILIZATION REVIEW
OBSERVATION 10/18/23 @ 1410 CONVERTED TO INPATIENT 10/19/23 @ 1051 DUE TO PNEUMONIA, COPD EXACERBATION REQUIRING IV STEROIDS, IV ABX. Initial Clinical Review    Admission: Date/Time/Statement:   Admission Orders (From admission, onward)       Ordered        10/19/23 1051  Inpatient Admission  Once            10/18/23 1410  Place in Observation  Once                          Orders Placed This Encounter   Procedures    Place in Observation     Standing Status:   Standing     Number of Occurrences:   1     Order Specific Question:   Level of Care     Answer:   Med Surg [16]    Inpatient Admission     Standing Status:   Standing     Number of Occurrences:   1     Order Specific Question:   Level of Care     Answer:   Med Surg [16]     Order Specific Question:   Estimated length of stay     Answer:   More than 2 Midnights     Order Specific Question:   Certification     Answer:   I certify that inpatient services are medically necessary for this patient for a duration of greater than two midnights. See H&P and MD Progress Notes for additional information about the patient's course of treatment. ED Arrival Information       Expected   -    Arrival   10/18/2023 11:36    Acuity   Emergent              Means of arrival   Wheelchair    Escorted by   Spouse    Service   Hospitalist    Admission type   Emergency              Arrival complaint   trouble breathing             Chief Complaint   Patient presents with    Chest Pain     Chest pain x 1 weeks and sob since last night. Usually wears 3 L but per wife he has been putting oxygen up to 4L    Shortness of Breath              Initial Presentation: 77 y.o. male to the ED from home with complaints of chest pain, shortness of breath. Admitted under observation then converted to inpatient for pneumonia, COPD with exacerbation, CHF.  H/O COPD on 3 L nasal cannula, obstructive sleep apnea on CPAP at night, chronic diastolic heart failure with ejection fraction of 35%, CKD stage IIIa, CAD, hypertension, hyperlipidemia. Arrives tachycardic, tachypneic, coughing up yellowish sputum. CXR shows RLL pneumonia. Blood and sputum cultures pending. Started in IV abx in the ED. Started on IV steroids. GIven nebulizer. Give Lasix IV. Lungs with wheezing breath sounds. Date:10/19  Continue IV steroids, nebulizers, IV abx. Blood and sputum cultures pending. Still with shortness of breath on exertion, wheezing breath sounds. Date: 10/20    Day 3: Has surpassed a 2nd midnight with active treatments and services, which include continue with iv solumedrol, nebulizers.     ED Triage Vitals   Temperature Pulse Respirations Blood Pressure SpO2   10/18/23 1144 10/18/23 1144 10/18/23 1144 10/18/23 1144 10/18/23 1144   (!) 97.2 °F (36.2 °C) 105 22 109/54 94 %      Temp Source Heart Rate Source Patient Position - Orthostatic VS BP Location FiO2 (%)   10/18/23 1144 10/18/23 1144 10/18/23 1144 10/18/23 1144 --   Temporal Monitor Sitting Left arm       Pain Score       10/18/23 1501       No Pain          Wt Readings from Last 1 Encounters:   10/19/23 83 kg (182 lb 15.7 oz)     Additional Vital Signs:  Signs (last 2 days)    Date/Time Temp Pulse Resp BP MAP (mmHg) SpO2 Calculated FIO2 (%) - Nasal Cannula Nasal Cannula O2 Flow Rate (L/min) O2 Device Patient Position - Orthostatic VS   10/19/23 0747 -- -- -- -- -- 100 % -- -- -- --   10/19/23 0736 -- -- -- -- -- 99 % 32 3 L/min Nasal cannula --   10/19/23 07:26:39 97 °F (36.1 °C) Abnormal  88 20 94/58  70 100 % -- -- -- --   BP: nurse aware at 10/19/23 0726   10/19/23 0716 -- -- -- -- -- 93 % 32 3 L/min Nasal cannula --   10/19/23 0715 -- -- -- -- -- 90 % -- -- -- --   10/18/23 2300 -- -- -- 88/56 Abnormal  -- -- -- -- -- --   10/18/23 22:54:32 97.7 °F (36.5 °C) 93 18 84/49 Abnormal  61 Abnormal  90 % -- -- -- --   10/18/23 1959 -- -- -- -- -- -- 32 3 L/min Nasal cannula --   10/18/23 1627 97.8 °F (36.6 °C) 99 20 95/65 -- -- -- -- -- --   10/18/23 1624 -- 99 -- -- -- 96 % -- -- -- --   10/18/23 15:01:50 97.8 °F (36.6 °C) 104 20 95/65 75 96 % 32 3 L/min Nasal cannula --   10/18/23 1345 -- 100 14 113/58 79 96 % -- -- Nasal cannula --   10/18/23 1300 -- 98 22 126/58 84 99 % 32 3 L/min Nasal cannula --   10/18/23 1144 97.2 °F (36.2 °C) Abnormal  105 22 109/54 -- 94 % 32 3 L/min Nasal cannula [place]     Pertinent Labs/Diagnostic Test Results:   10/19 EKG: Baseline artifact  Sinus tachycardia with occasional Premature ventricular complexes  Nonspecific ST-t wave changes  When compared with ECG of 16-AUG-2023 19:25,  No significant change was found  XR chest 1 view portable   ED Interpretation by Sreedhar Esqueda PA-C (10/18 4981)   Abnormal   Right lower lobe consolidation      Final Result by Author Delores MD (10/18 [id number])      New bilateral lower lung pneumonia. Resident: Maryjo Sánchez, the attending radiologist, have reviewed the images and agree with the final report above.       Workstation performed: [id number]           Results from last 7 days   Lab Units 10/18/23  1226   SARS-COV-2  Negative     Results from last 7 days   Lab Units 10/19/23  0601 10/18/23  1210   WBC Thousand/uL 9.17 6.77   HEMOGLOBIN g/dL 13.9 13.6   HEMATOCRIT % 44.7 44.0   PLATELETS Thousands/uL 145* 140*   NEUTROS ABS Thousands/µL  --  5.10         Results from last 7 days   Lab Units 10/19/23  0513 10/18/23  1210   SODIUM mmol/L 139 143   POTASSIUM mmol/L 4.6 3.1*   CHLORIDE mmol/L 105 105   CO2 mmol/L 24 31   ANION GAP mmol/L 10 7   BUN mg/dL 16 13   CREATININE mg/dL 0.98 1.02   EGFR ml/min/1.73sq m 73 69   CALCIUM mg/dL 8.2* 8.6   MAGNESIUM mg/dL 2.5  --    PHOSPHORUS mg/dL 3.1  --      Results from last 7 days   Lab Units 10/19/23  0513 10/18/23  1210   AST U/L  --  22   ALT U/L  --  20   ALK PHOS U/L  --  53   TOTAL PROTEIN g/dL  --  5.4*   ALBUMIN g/dL 3.6 3.6   TOTAL BILIRUBIN mg/dL  --  0.66         Results from last 7 days   Lab Units 10/19/23  0589 10/18/23  1210   GLUCOSE RANDOM mg/dL 136 164*           Results from last 7 days   Lab Units 10/18/23  1210   HS TNI 0HR ng/L 26       Results from last 7 days   Lab Units 10/19/23  0513 10/18/23  1210   PROCALCITONIN ng/ml 0.09 0.10     Results from last 7 days   Lab Units 10/18/23  1324   LACTIC ACID mmol/L 1.7     Results from last 7 days   Lab Units 10/18/23  1210   BNP pg/mL 774*       Results from last 7 days   Lab Units 10/18/23  1714 10/18/23  1226   STREP PNEUMONIAE ANTIGEN, URINE  Negative  --    LEGIONELLA URINARY ANTIGEN  Negative  --    INFLUENZA A PCR   --  Negative   INFLUENZA B PCR   --  Negative   RSV PCR   --  Negative       Results from last 7 days   Lab Units 10/18/23  1751 10/18/23  1324 10/18/23  1323   BLOOD CULTURE   --  Received in Microbiology Lab. Culture in Progress. Received in Microbiology Lab. Culture in Progress.    SPUTUM CULTURE  Culture too young- will reincubate  --   --    GRAM STAIN RESULT  1+ Epithelial cells per low power field*  1+ Polys*  1+ Gram positive cocci in clusters*  --   --      ED Treatment:   Medication Administration from 10/18/2023 1136 to 10/18/2023 1445         Date/Time Order Dose Route Action Comments     10/18/2023 1226 EDT albuterol inhalation solution 5 mg 5 mg Nebulization Given --     10/18/2023 1226 EDT ipratropium (ATROVENT) 0.02 % inhalation solution 0.5 mg 0.5 mg Nebulization Given --     10/18/2023 1226 EDT methylPREDNISolone sodium succinate (Solu-MEDROL) injection 125 mg 125 mg Intravenous Given --     10/18/2023 1250 EDT potassium chloride (K-DUR,KLOR-CON) CR tablet 40 mEq 40 mEq Oral Given --     10/18/2023 1308 EDT potassium chloride 20 mEq IVPB (premix) 20 mEq Intravenous New Bag --     10/18/2023 1329 EDT cefTRIAXone (ROCEPHIN) IVPB (premix in dextrose) 1,000 mg 50 mL 1,000 mg Intravenous New Bag --     10/18/2023 1423 EDT azithromycin (ZITHROMAX) 500 mg in sodium chloride 0.9 % 250 mL IVPB 500 mg Intravenous New Bag --          Past Medical History:   Diagnosis Date    Abnormal ECG 2021 OR 2022    Alcoholism ([address]) 1984    sober 38 years    Arthritis 2008    beginning    Bilateral carotid artery stenosis     Callus     Cancer (HCC)     skin    Chronic diastolic heart failure (HCC)     Chronic ischemic heart disease     Chronic kidney disease     stage 3    Colon polyp     COPD (chronic obstructive pulmonary disease) (HCC)     Coronary artery disease     hx stents, MI, PCI    COVID 11/2021    CPAP (continuous positive airway pressure) dependence     Disease of thyroid gland 2017    nodules    Emphysema of lung ([address]) 1995    started    Hearing loss     History of transfusion 1995    Hyperlipidemia     Hypertension     Intracranial aneurysm 04/25/2023    Lung nodule 2023    x rays    MI (myocardial infarction) ([address]) 1995    Myocardial infarction ([address]) 1995    Pneumonia     Pneumothorax 02/20/2023    collapsed lung    Prostate cancer ([address])     RSV (respiratory syncytial virus infection) 10/2022    S/P carotid endarterectomy     Shortness of breath     O2 2 l/nc PRN    Sleep apnea     Sleep apnea, obstructive     Stented coronary artery      Present on Admission:   Pneumonia of right lower lobe due to infectious organism   COPD with acute exacerbation (HCC)   Chronic diastolic (congestive) heart failure (HCC)   Hypertension   LAMBERTO on CPAP   Coronary artery disease involving native coronary artery of native heart without angina pectoris   Chronic kidney disease, stage 3a ([address])   Chronic respiratory failure with hypoxia ([facility])      Admitting Diagnosis: Hypokalemia [E87.6]  Chest pain [R07.9]  Pneumonia [J18.9]  SOB (shortness of breath) [R06.02]  Hypoxia [R09.02]  Age/Sex: 77 y.o. male  Admission Orders:  SCDS   Up and oOB  I/O  Dysphagia, nectar thick liquid diet  Scheduled Medications:  aspirin, 81 mg, Oral, HS  atorvastatin, 80 mg, Oral, Daily With Dinner  azithromycin, 500 mg, Oral, Q24H  budesonide, 0.5 mg, Nebulization, Q12H  cyanocobalamin, 1,000 mcg, Intramuscular, Q30 Days  famotidine, 20 mg, Oral, HS  formoterol, 20 mcg, Nebulization, Q12H  gabapentin, 300 mg, Oral, HS  guaiFENesin, 1,200 mg, Oral, BID  heparin (porcine), 5,000 Units, Subcutaneous, Q8H SVETLANA  ipratropium, 0.5 mg, Nebulization, TID  levalbuterol, 1.25 mg, Nebulization, TID  methylPREDNISolone sodium succinate, 40 mg, Intravenous, Q8H  metoprolol succinate, 25 mg, Oral, Daily  pantoprazole, 20 mg, Oral, Early Morning  sacubitril-valsartan, 1 tablet, Oral, BID      Continuous IV Infusions:     PRN Meds:  acetaminophen, 650 mg, Oral, Q6H PRN  benzonatate, 100 mg, Oral, TID PRN        None    Network Utilization Review Department  ATTENTION: Please call with any questions or concerns to [phone] and carefully listen to the prompts so that you are directed to the right person. All voicemails are confidential.   For Discharge needs, contact Care Management DC Support Team at [phone] opt. 2  Send all requests for admission clinical reviews, approved or denied determinations and any other requests to dedicated fax number below belonging to the campus where the patient is receiving treatment.  List of dedicated fax numbers for the Facilities:  [place] DENIALS (Administrative/Medical Necessity) [phone]   DISCHARGE SUPPORT TEAM (NETWORK) [phone] [facility] [facility]) [phone]   [address] [phone]   [facility] [address] [facility] [phone] [address] [place] [address] [place] [phone]   [facility] [phone]

## 2023-10-19 NOTE — ASSESSMENT & PLAN NOTE
54-year-old male patient with COPD on 3 L/min of oxygen presents with increased productive cough, shortness of breath, wheezing and increased O2 requirements up to 4L NC    CXR in ER showing New bilateral lower lung pneumonia. Received ceftriaxone and and azithromycin on admission  Hospital day 1: procal negative x 2, no leukocytosis.  Dc ceftriaxome    F/U blood cultures and sputum cultures  Supportive care

## 2023-10-19 NOTE — PROGRESS NOTES
[address]  Progress Note  Name: Meenakshi Kaye  MRN: [id number]  Unit/Bed#: -01 I Date of Admission: 10/18/2023   Date of Service: 10/19/2023 I Hospital Day: 0    Assessment/Plan   COPD with acute exacerbation [facility])  Assessment & Plan  Presents with increased work of breathing wheezing and coughing  Suspect patient is in COPD exacerbation secondary to pneumonia  Received Solu-Medrol 125 mg in the ER and DuoNeb treatment  Continue Solu-Medrol 40 mg every 8 hours day 2  Azithromycin day 2 of 3  Ipratropium and Xopenex nebulizers 3 times daily  Formoterol and budesonide nebulizers twice daily  Down to home o2 requirements of 2L Nc  If continues to improve plan to d/c in 24 hours    Pneumonia of right lower lobe due to infectious organism  Assessment & Plan  59-year-old male patient with COPD on 3 L/min of oxygen presents with increased productive cough, shortness of breath, wheezing and increased O2 requirements up to 4L NC    CXR in ER showing New bilateral lower lung pneumonia. Received ceftriaxone and and azithromycin on admission  Hospital day 1: procal negative x 2, no leukocytosis.  Dc ceftriaxome    F/U blood cultures and sputum cultures  Supportive care      Chronic diastolic (congestive) heart failure (HCC)  Assessment & Plan  Wt Readings from Last 3 Encounters:   10/19/23 83 kg (182 lb 15.7 oz)   10/02/23 82.9 kg (182 lb 12.8 oz)   09/29/23 82.1 kg (181 lb)       -Transthoracic echocardiogram 8/18/2023 showing left ventricular systolic function moderately reduced estimated LVEF 35% with grade 1 diastolic dysfunction, mildly dilated and mildly reduced right ventricular systolic function with mild tricuspid regurgitation and aortic sclerosis     continue aspirin 81 mg daily, Jardiance 10 mg daily, metoprolol succinate 25 mg daily, potassium 20 mEq every 48 hours, Crestor 40 mg daily, Entresto 24-26 mg twice daily   Patient's cardiologist discontinued aldactone 9/2023 due to borderline hypotension  S/P Lasix 40 IV today  Transition to oral lasix 20 mg daily tomorrow      Chronic kidney disease, stage 3a [facility])  Assessment & Plan  Lab Results   Component Value Date    EGFR 73 10/19/2023    EGFR 69 10/18/2023    EGFR 53 10/02/2023    CREATININE 0.98 10/19/2023    CREATININE 1.02 10/18/2023    CREATININE 1.27 10/02/2023     Baseline Cr 1-1.2  At baseline    LAMBERTO on CPAP  Assessment & Plan  Continue cpap [facility]    Hypertension  Assessment & Plan  Continue home Toprol-XL    Coronary artery disease involving native coronary artery of native heart without angina pectoris  Assessment & Plan  Continue home aspirin 81 mg daily, metoprolol 12.5 mg twice daily, and resume home Crestor 40 mg daily             Chronic respiratory failure with hypoxia due to chronic COPD a/e/b need for continuous O2, normally at 3 LPM, currently at 4 LPM.     VTE Pharmacologic Prophylaxis:   Moderate Risk (Score 3-4) - Pharmacological DVT Prophylaxis Ordered: heparin. Patient Centered Rounds: I performed bedside rounds with nursing staff today. Discussions with Specialists or Other Care Team Provider: case management    Education and Discussions with Family / Patient: Updated  (daughter) via phone. Total Time Spent on Date of Encounter in care of patient: 35 mins. This time was spent on one or more of the following: performing physical exam; counseling and coordination of care; obtaining or reviewing history; documenting in the medical record; reviewing/ordering tests, medications or procedures; communicating with other healthcare professionals and discussing with patient's family/caregivers. Current Length of Stay: 0 day(s)  Current Patient Status: Inpatient   Certification Statement: The patient will continue to require additional inpatient hospital stay due to copd exacerbation  Discharge Plan: Anticipate discharge tomorrow to discharge location to be determined pending rehab evaluations.     Code Status: Level 2 - DNAR: but accepts endotracheal intubation    Subjective:   Patient seen examined at bedside. Admits to still having shortness of breath on exertion. States that he is feeling better states that his cough has resolved. Objective:     Vitals:   Temp (24hrs), Av.5 °F (36.4 °C), Min:97 °F (36.1 °C), Max:97.8 °F (36.6 °C)    Temp:  [97 °F (36.1 °C)-97.8 °F (36.6 °C)] 97 °F (36.1 °C)  HR:  [] 96  Resp:  [14-22] 20  BP: ()/(49-65) 104/56  SpO2:  [85 %-100 %] 85 %  Body mass index is 27.82 kg/m². Input and Output Summary (last 24 hours): Intake/Output Summary (Last 24 hours) at 10/19/2023 1054  Last data filed at 10/19/2023 1025  Gross per 24 hour   Intake 240 ml   Output 800 ml   Net -560 ml       Physical Exam:   Physical Exam  Vitals and nursing note reviewed. Constitutional:       General: He is not in acute distress. Appearance: He is well-developed. HENT:      Head: Normocephalic and atraumatic. Eyes:      Conjunctiva/sclera: Conjunctivae normal.   Cardiovascular:      Rate and Rhythm: Normal rate and regular rhythm. Heart sounds: No murmur heard. Pulmonary:      Effort: Pulmonary effort is normal. No respiratory distress. Breath sounds: Wheezing present. Abdominal:      Palpations: Abdomen is soft. Tenderness: There is no abdominal tenderness. Musculoskeletal:         General: No swelling. Cervical back: Neck supple. Skin:     General: Skin is warm and dry. Capillary Refill: Capillary refill takes less than 2 seconds. Neurological:      Mental Status: He is alert. Mental status is at baseline.    Psychiatric:         Mood and Affect: Mood normal.          Additional Data:     Labs:  Results from last 7 days   Lab Units 10/19/23  0601 10/18/23  1210   WBC Thousand/uL 9.17 6.77   HEMOGLOBIN g/dL 13.9 13.6   HEMATOCRIT % 44.7 44.0   PLATELETS Thousands/uL 145* 140*   NEUTROS PCT %  --  75   LYMPHS PCT %  --  13*   MONOS PCT %  -- 8   EOS PCT %  --  4     Results from last 7 days   Lab Units 10/19/23  0513 10/18/23  1210   SODIUM mmol/L 139 143   POTASSIUM mmol/L 4.6 3.1*   CHLORIDE mmol/L 105 105   CO2 mmol/L 24 31   BUN mg/dL 16 13   CREATININE mg/dL 0.98 1.02   ANION GAP mmol/L 10 7   CALCIUM mg/dL 8.2* 8.6   ALBUMIN g/dL 3.6 3.6   TOTAL BILIRUBIN mg/dL  --  0.66   ALK PHOS U/L  --  53   ALT U/L  --  20   AST U/L  --  22   GLUCOSE RANDOM mg/dL 136 164*                 Results from last 7 days   Lab Units 10/19/23  0513 10/18/23  1324 10/18/23  1210   LACTIC ACID mmol/L  --  1.7  --    PROCALCITONIN ng/ml 0.09  --  0.10       Lines/Drains:  Invasive Devices       Peripheral Intravenous Line  Duration             Peripheral IV 10/18/23 Right Antecubital 1 day    Peripheral IV 10/18/23 Left Antecubital <1 day                          Imaging: Reviewed radiology reports from this admission including: chest xray    Recent Cultures (last 7 days):   Results from last 7 days   Lab Units 10/18/23  1714 10/18/23  1324 10/18/23  1323   BLOOD CULTURE   --  Received in Microbiology Lab. Culture in Progress. Received in Microbiology Lab. Culture in Progress.    LEGIONELLA URINARY ANTIGEN  Negative  --   --        Last 24 Hours Medication List:   Current Facility-Administered Medications   Medication Dose Route Frequency Provider Last Rate    acetaminophen  650 mg Oral Q6H PRN Pandi Todhe, DO      aspirin  81 mg Oral HS Pandi Todhe, DO      atorvastatin  80 mg Oral Daily With Dominguez Oil, DO      azithromycin  500 mg Oral Q24H Pandi Todhe, DO      benzonatate  100 mg Oral TID PRN Pandi Todhe, DO      budesonide  0.5 mg Nebulization Q12H Pandi Todhe, DO      cyanocobalamin  1,000 mcg Intramuscular Q30 Days Pandi Todhe, DO      famotidine  20 mg Oral HS Pandi Todhe, DO      formoterol  20 mcg Nebulization Q12H Pandi Todhe, DO      gabapentin  300 mg Oral HS Pandi Todhe, DO      guaiFENesin  1,200 mg Oral BID Pandi Todhe, DO      heparin (porcine) 5,000 Units Subcutaneous Q8H [facility] Tesha Avila DO      ipratropium  0.5 mg Nebulization TID Tesha Avila DO      levalbuterol  1.25 mg Nebulization TID Tesha Avila DO      methylPREDNISolone sodium succinate  40 mg Intravenous Q8H Tesha Avila,       metoprolol succinate  25 mg Oral Daily Tesha Avila DO      midodrine  10 mg Oral TID AC Tesha Avila DO      pantoprazole  20 mg Oral Early Morning Tesha Avila DO      sacubitril-valsartan  1 tablet Oral BID Tesha Avila DO          Today, Patient Was Seen By: Beatrice Mathews DO    **Please Note: This note may have been constructed using a voice recognition system. **

## 2023-10-19 NOTE — PLAN OF CARE
Hi  As you may be aware [place] is conducting a personalized medicine hypertension clinical trial (PGEN.)  We are enrolling patients with new onset hypertension or uncontrolled hypertension on one blood pressure medicine.       During a recent office visit this patient was identified as a potential candidate via a BPA alert  that detects multiple high blood pressure readings at recent clinic visits.     Would you be ok with our team will be reaching out to this patient to invite her to participate?  At that visit the patient would see a pharmacist or provider and have blood pressure retested.  If blood pressure is high again at that visit we would make a diagnosis of hypertension and we would offer enrollment in the study.       Details of there study can be found by typing <dot>[email or web address].  For your convenience I have copied and pasted the materials below      Please respond to this message with your preference and we would perform all the outreach and scheduling.  No other action would be needed on your part after you send us back your response.     Thanks     Nani JhaveriD    and     Courtney Olvera MD  [facility] principle investigator  =========================================  PGen Hypertension Study Summary     Thank you for your interest in the [facility] hypertension research study. This study is designed to test whether genetically guided blood pressure medication management is better than the standard of care.  Both groups will use medications that have been used for many years to treat high blood pressure ( diuretics, beta blockers, calcium channel blockers, AceI /ARB).  The ORDER of medications chosen may be different however. All participants will receive the genetic testing for free along with free research related visits.  Participants will not be given the results of their genetic test results until the END of the study.Participants will also receive compensation totaling about $100 for completing all  Problem: PAIN - ADULT  Goal: Verbalizes/displays adequate comfort level or baseline comfort level  Description: Interventions:  - Encourage patient to monitor pain and request assistance  - Assess pain using appropriate pain scale  - Administer analgesics based on type and severity of pain and evaluate response  - Implement non-pharmacological measures as appropriate and evaluate response  - Consider cultural and social influences on pain and pain management  - Notify physician/advanced practitioner if interventions unsuccessful or patient reports new pain  Outcome: Progressing     Problem: Knowledge Deficit  Goal: Patient/family/caregiver demonstrates understanding of disease process, treatment plan, medications, and discharge instructions  Description: Complete learning assessment and assess knowledge base.   Interventions:  - Provide teaching at level of understanding  - Provide teaching via preferred learning methods  Outcome: Progressing     Problem: Prexisting or High Potential for Compromised Skin Integrity  Goal: Skin integrity is maintained or improved  Description: INTERVENTIONS:  - Identify patients at risk for skin breakdown  - Assess and monitor skin integrity  - Assess and monitor nutrition and hydration status  - Monitor labs   - Assess for incontinence   - Turn and reposition patient  - Assist with mobility/ambulation  - Relieve pressure over bony prominences  - Avoid friction and shearing  - Provide appropriate hygiene as needed including keeping skin clean and dry  - Evaluate need for skin moisturizer/barrier cream  - Collaborate with interdisciplinary team   - Patient/family teaching  - Consider wound care consult   Outcome: Progressing     Problem: DISCHARGE PLANNING  Goal: Discharge to home or other facility with appropriate resources  Description: INTERVENTIONS:  - Identify barriers to discharge w/patient and caregiver  - Arrange for needed discharge resources and transportation as appropriate  - Identify discharge learning needs (meds, wound care, etc.)  - Arrange for interpretive services to assist at discharge as needed  - Refer to Case Management Department for coordinating discharge planning if the patient needs post-hospital services based on physician/advanced practitioner order or complex needs related to functional status, cognitive ability, or social support system  Outcome: Progressing study visits and surveys.      If you would like to enroll or know more about using your genetics to determine which hypertensive medications may work best for you please contact the research coordinator as [phone] or email [email or web address]    Who may qualify for the study:  1) New diagnosis of high blood pressure but not yet on blood pressure medication.  2) Existing diagnosis of hypertension but blood pressure is  uncontrolled with 1 or less class of medications.   3) Age between 30 and 70  4) BMI between 19-50    Who should NOT be in the study:    1) All patient taking more than 1 class of hypertensive medication are excluded.   2) Documented instance of following conditions    A. Cardiac Disease  i. ICD-10 Code for Coronary Artery Disease - CAD: 410.* -414.*, I25.*  ii. ICD-10 Code for Heart Failure - 428.*, I50.*  iii. ICD-10 Code for Congenital Cardiac Disease - 745.*, -747.*, Q20.*, -Q28.*   B. Kidney Disease        i. ICD-10 Code for Chronic Kidney Disease - CKD:ICD9 585.*and ICD10 N18.*   C. Vascular Disease        i. ICD-10 Code for Peripheral Vascular Disease - 443.9, I73.9        ii. ICD-10 Code for Pulmonary Hypertension - 416.0, 416.8, I27.0, I27.2   D. Secondary Hypertension                     i. ICD-10 Code for Hypertension Secondary to Other Diseases - I15.0-2, I15.8-9  3) Any patient who has chronic medical conditions that  their doctor/provider feels would make them unsafe to participate in a research study where initial choice of blood pressure  medication could be from 1 of these 4 BP classes of medicines: diuretics, beta blockers, calcium channel blockers, AceI /ARB.    Study visit occur at the following clinic locations  North:  1) [place]  2) [place]  3) [place]  4) [place]  5) [place]  6) [place]  7) [place]    South:  1) [facility])  2) [place]  [facility]) [facility]  1) [place]    Patient Expectations:    1) Take Hypertension medications  2)  Attend scheduled study visits  3) Complete online surveys sent between visits     If enrolled patient is asked to attend 5 to 8 study visits over the next 12 months.

## 2023-10-19 NOTE — PHYSICAL THERAPY NOTE
PHYSICAL THERAPY EVALUATION  NAME:  Ari Borges  DATE: 10/19/23    AGE:   77 y.o.  Mrn:   [id number]  ADMIT DX:  Hypokalemia [E87.6]  Chest pain [R07.9]  Pneumonia [J18.9]  SOB (shortness of breath) [R06.02]  Hypoxia [R09.02]  Problem List:   Patient Active Problem List   Diagnosis    Hyperlipidemia    Coronary artery disease involving native coronary artery of native heart without angina pectoris    Left carotid artery occlusion    Hypertension    COPD with acute exacerbation ([facility])    Oxygen dependent    Depression, recurrent ([facility])    S/P carotid endarterectomy    Stented coronary artery    Vertigo    Anisometropia    Osteoarthritis of spinal facet joint    Chronic ischemic heart disease    Chronic diastolic (congestive) heart failure ([facility])    Diverticular disease of colon    Dry eye syndrome    GERD (gastroesophageal reflux disease)    Acute hearing loss, right    Elevated PSA    Hypoxia    Lens replaced by other means    Lumbar radiculopathy    Multinodular goiter    Nuclear senile cataract    Obesity    Occlusion and stenosis of carotid artery    Osteoarthritis of hip    Prediabetes    LAMBERTO on CPAP    Solitary pulmonary nodule    Thyroid nodule    Guyon syndrome, left    Costochondral chest pain    Abnormal nuclear stress test    Right hip pain    Primary insomnia    Chronic right-sided thoracic back pain    Hoarseness or changing voice    Chronic bilateral low back pain with right-sided sciatica    Mid back pain    Thoracic radiculopathy    Chronic pain syndrome    Urinary frequency    Incomplete bladder emptying    Intercostal neuralgia    Cubital tunnel syndrome on left    Carpal tunnel syndrome on left    Elevated MCV    Prostate cancer (HCC)    Chronic respiratory failure with hypoxia (HCC)    Sensorineural hearing loss, bilateral    Ambulatory dysfunction    COPD (chronic obstructive pulmonary disease) ([facility])    Hypokalemia    Left leg pain    Transient right leg weakness    Leukocytosis    Pneumonia of right lower lobe due to infectious organism    Orthopnea    Lower extremity edema    Irregular heart rhythm    Elevated troponin    Intracranial aneurysm    Congestive heart failure with left ventricular systolic dysfunction (LVSD) (HCC)    Hypoxemia    Dysphagia    Erectile dysfunction    Allergic rhinitis, unspecified    Chronic kidney disease, stage 3a (HCC)    Generalized muscle weakness    Hypertensive heart and chronic kidney disease with heart failure and stage 1 through stage 4 chronic kidney disease, or unspecified chronic kidney disease ([address])    Need for assistance with personal care    Other abnormalities of gait and mobility    Retention of urine, unspecified    Sudden idiopathic hearing loss, right ear    Acute and chronic respiratory failure with hypoxia ([address])    Other disorders of refraction    Occlusion and stenosis of unspecified carotid artery       Past Medical History  Past Medical History:   Diagnosis Date    Abnormal ECG 2021 OR 2022    Alcoholism ([address]) 1984    sober 38 years    Arthritis 2008    beginning    Bilateral carotid artery stenosis     Callus     Cancer (HCC)     skin    Chronic diastolic heart failure (HCC)     Chronic ischemic heart disease     Chronic kidney disease     stage 3    Colon polyp     COPD (chronic obstructive pulmonary disease) (HCC)     Coronary artery disease     hx stents, MI, PCI    COVID 11/2021    CPAP (continuous positive airway pressure) dependence     Disease of thyroid gland 2017    nodules    Emphysema of lung ([address]) 1995    started    Hearing loss     History of transfusion 1995    Hyperlipidemia     Hypertension     Intracranial aneurysm 04/25/2023    Lung nodule 2023    x rays    MI (myocardial infarction) ([address]) 1995    Myocardial infarction ([address]) 1995    Pneumonia     Pneumothorax 02/20/2023    collapsed lung    Prostate cancer ([address])     RSV (respiratory syncytial virus infection) 10/2022    S/P carotid endarterectomy     Shortness of breath     O2 2 l/nc PRN    Sleep apnea     Sleep apnea, obstructive     Stented coronary artery        Past Surgical History  Past Surgical History:   Procedure Laterality Date    APPENDECTOMY      CARDIAC CATHETERIZATION  03/18/2021    left main with no significant disease, proximal LAD with 10% stenosis at the site of prior stent, left circumflex artery with minimal luminal irregularities mid RCA with 50% stenosis at site of prior stent and PL segment with distal disease supplied by collaterals from the distal circumflex with no significant CAD requiring revascularization at that time.     CATARACT EXTRACTION, BILATERAL Bilateral     COLONOSCOPY      CORONARY ANGIOPLASTY WITH STENT PLACEMENT  1999    RCA    CORONARY ANGIOPLASTY WITH STENT PLACEMENT  2001    RCA    CORONARY ANGIOPLASTY WITH STENT PLACEMENT  2003    LAD    EYE SURGERY      KS BX PROSTATE STRTCTC SATURATION SAMPLING IMG GID N/A 09/13/2022    Procedure: TRANSPERINEAL MRI FUSION  BIOPSY PROSTATE;  Surgeon: Lottie Marmolejo MD;  [facility];  Service: Urology    KS NEUROPLASTY &/TRANSPOS MEDIAN NRV CARPAL Vanice Latus Left 07/22/2022    Procedure: RELEASE CARPAL TUNNEL;  Surgeon: Goran Burton MD;  Location[facility];  Service: Orthopedics    KS NEUROPLASTY &/TRANSPOSITION ULNAR NERVE ELBOW Left 07/22/2022    Procedure: RELEASE CUBITAL TUNNEL;  Surgeon: Goran Burton MD;  [facility];  Service: Orthopedics    KS NEUROPLASTY &/TRANSPOSITION ULNAR NERVE WRIST Left 07/22/2022    Procedure: Jordana Alt RELEASE;  Surgeon: Goran Burton MD;  [facility];  Service: Orthopedics    SKIN CANCER EXCISION  2012    chin-per pt, basal cell  also right ankle    TONSILLECTOMY  no       Length Of Stay: 0  Performed at least 2 patient identifiers during session: Name and Birthday     10/19/23 0841   PT Last Visit   PT Visit Date 10/19/23   Note Type   Note type Evaluation   Pain Assessment   Pain Assessment Tool 0-10   Pain Score No Pain Restrictions/Precautions   Weight Bearing Precautions Per Order No   Braces or Orthoses   (none reported)   Other Precautions Fall Risk;Pain;Hard of hearing;O2;Multiple lines; Chair Alarm; Bed Alarm  (3L O2, does wear at home; very [facility])   Home Living   Type of [address] [place] [address] Dr One level;Performs ADLs on one level; Able to live on main level with bedroom/bathroom;Stairs to enter with rails  (3 SATNAM)   Bathroom Shower/Tub Walk-in shower   Bathroom Toilet Standard   [facility] Walker   Additional Comments Pt reports use of RW at baseline   Prior Function   Level of [place] Independent with ADLs; Independent with functional mobility; Needs assistance with IADLS   Lives With Spouse;Daughter   Receives Help From Family   IADLs Independent with driving; Independent with meal prep; Independent with medication management   Falls in the last 6 months 0  (pt denies)   Comments Pt reports recieving outpatient PT services "a few times during the week"   General   Family/Caregiver Present No   Cognition   Overall Cognitive Status WFL   Arousal/Participation Alert   Attention Within functional limits   Orientation Level Oriented X4   Memory Decreased recall of precautions;Decreased recall of recent events   Following Commands Follows all commands and directions without difficulty   Comments Pt agreeable to PT evaluation   RLE Assessment   RLE Assessment WFL  (4/5)   LLE Assessment   LLE Assessment WFL  (4/5)   Vision-Basic Assessment   Current Vision Wears glasses all the time   Coordination   Sensation WFL   Light Touch   RLE Light Touch Grossly intact   LLE Light Touch Grossly intact   Bed Mobility   Supine to Sit 6  Modified independent   Additional items HOB elevated   Sit to Supine   (not tested as pt seated in bedside chair at end of session)   Additional Comments supine BP: 96/52, sitting EOB BP: 97/50, BP post transfer to bedside chair 95/57.  SPo2 86-94% on 3LO2 via nc throughout session, Pt cued in pursed lip breathing after activity to address SPo2 desaturation, pt demonstrated good understanding   Transfers   Sit to Stand 5  Supervision   Additional items Assist x 1; Increased time required;Verbal cues   Stand to Sit 5  Supervision   Additional items Assist x 1; Increased time required;Verbal cues   Stand pivot 5  Supervision   Additional items Assist x 1; Increased time required;Verbal cues   Additional Comments RW used during transfers, pt without LOB in any direction and without complaints of lightheadednes,s SOB, chest pain, dizziness   Ambulation/Elevation   Gait pattern Improper Weight shift;Decreased foot clearance   Gait Assistance 5  Supervision   Additional items Assist x 1;Verbal cues   Assistive Device Rolling walker   Distance 10'   Stair Management Assistance Not tested   Balance   Static Sitting Good   Dynamic Sitting Fair +   Static Standing Fair   Dynamic Standing Fair   Ambulatory Fair   Endurance Deficit   Endurance Deficit Yes   Endurance Deficit Description decreased activity tolerance, O2 desaturation   Activity Tolerance   Activity Tolerance Patient limited by fatigue  (CHACON, SOB)   Medical Staff Made Aware Co evaluation with ROZINA Alcantara secondary to complex medical condition of pt, possible A of 2 required to achieve and maintain transitional movements, requiring the need of skilled therapeutic intervention of 2 therapists to achieve delivery of services. Nurse Made Aware RN aware of session outcomes   Assessment   Prognosis Good   Problem List Decreased endurance; Impaired balance;Decreased mobility; Decreased safety awareness; Impaired hearing   Assessment Pt is 77 y.o. male seen for high-complexity PT evaluation on 10/19/2023 s/p admit [address] on 10/18/2023 w/ <principal problem not specified>. PT was consulted to assess pt's functional mobility and d/c needs.  Order placed for PT eval and tx, w/  out of bed to chair order. PTA, pt was living with his spouse and daughter in a one story home with 3 SATNAM and reports independence with ADLs and functional mobility with use of RW at baseline. At time of eval, pt completed sup > sit Kevon with HOB elevated and progressed to STS, SPT, and ambulation 10' with RW supervision. Pt on 3Lo2 via nc throughout session with Spo2 86-94% on 3LO2 via nc throughout session, Pt cued in pursed lip breathing after activity to address SPo2 desaturation, pt demonstrated good understanding. Upon evaluation, pt presenting with impaired functional mobility d/t decreased endurance, impaired balance, decreased mobility, and activity intolerance. Pertinent PMHx and current co-morbidities affecting pt's physical performance at time of assessment include: COPD with acute exacerbation, pneumonia of right lower lobe due to infectious organism,chronic diastolic heart failure, chronic kidney disease, HTN. Personal factors affecting pt at time of eval include: ambulating w/ assistive device and stairs to enter home. The following objective measures performed on IE also reveal limitations: AM-PAC 6-Clicks: 21/76. Pt's clinical presentation is currently unstable/unpredictable seen in pt's presentation of advanced age, need for supervision assist w/ all phases of mobility when usually mobilizing independently, tolerance to only 10 feet of ambulation, ongoing medical assessment, and O2 desaturation with activity, hypotension . Overall, pt's rehab potential and prognosis to return to PLOF is good as impacted by objective findings, warranting pt to receive further skilled PT interventions to address identified impairments, activity limitation(s), and participation restriction(s). Goal for patient is to return home. Pt to benefit from continued PT tx to address deficits as defined above and maximize level of functional independent mobility and consistency in order for pt to increase safety and activity tolerance.  From PT/mobility standpoint, recommendation at time of d/c would be home with outpatient rehabilitation pending progress in order to facilitate return to PLOF. Goals   Patient Goals to go home   STG Expiration Date 10/29/23   Short Term Goal #1 In 10 days: Perform all transfers modified independent to improve independence, Ambulate > 100 ft. with RW modified independent w/o LOB and w/ normalized gait pattern 100% of the time, Navigate 3 stairs modified independent with unilateral handrail to facilitate return to previous living environment, Increase all balance 1/2 grade to decrease risk for falls, and Tolerate standing 2-3 minutes to facilitate functional task performance   PT Treatment Day 0   Plan   Treatment/Interventions Functional transfer training;Elevations; Therapeutic exercise; Endurance training;Patient/family training;Gait training;OT   PT Frequency 3-5x/wk   Discharge Recommendation   PT Discharge Recommendation Home with outpatient rehabilitation   Equipment Recommended [facility] Recommended Wheeled walker  (pt has RW)   AM-PAC Basic Mobility Inpatient   Turning in Flat Bed Without Bedrails 4   Lying on Back to Sitting on Edge of Flat Bed Without Bedrails 4   Moving Bed to Chair 3   Standing Up From Chair Using Arms 3   Walk in Room 3   Climb 3-5 Stairs With Railing 3   Basic Mobility Inpatient Raw Score 20   Basic Mobility Standardized Score 43.99   Highest Level Of Mobility   JH-HLM Goal 6: Walk 10 steps or more   JH-HLM Achieved 6: Walk 10 steps or more   End of Consult   Patient Position at End of Consult Bed/Chair alarm activated; All needs within reach; Bedside chair     Time In: 5676  Time Out: 0901  Total Evaluation Minutes: [address] [facility],[address], PT

## 2023-10-19 NOTE — PLAN OF CARE
Problem: PHYSICAL THERAPY ADULT  Goal: Performs mobility at highest level of function for planned discharge setting. See evaluation for individualized goals. Description: Treatment/Interventions: Functional transfer training, Elevations, Therapeutic exercise, Endurance training, Patient/family training, Gait training, OT  Equipment Recommended: Negra Warren       See flowsheet documentation for full assessment, interventions and recommendations. Note: Prognosis: Good  Problem List: Decreased endurance, Impaired balance, Decreased mobility, Decreased safety awareness, Impaired hearing  Assessment: Pt is 77 y.o. male seen for high-complexity PT evaluation on 10/19/2023 s/p admit [address] [place] [place] [facility] [place] on 10/18/2023 w/ <principal problem not specified>. PT was consulted to assess pt's functional mobility and d/c needs. Order placed for PT eval and tx, w/  out of bed to chair  order. PTA, pt was living with his spouse and daughter in a one story home with 3 SATNAM and reports independence with ADLs and functional mobility with use of RW at baseline. At time of eval, pt completed sup > sit Kevon with HOB elevated and progressed to STS, SPT, and ambulation 10' with RW supervision. Pt on 3Lo2 via nc throughout session with Spo2 86-94% on 3LO2 via nc throughout session, Pt cued in pursed lip breathing after activity to address SPo2 desaturation, pt demonstrated good understanding. Upon evaluation, pt presenting with impaired functional mobility d/t decreased endurance, impaired balance, decreased mobility, and activity intolerance. Pertinent PMHx and current co-morbidities affecting pt's physical performance at time of assessment include: COPD with acute exacerbation, pneumonia of right lower lobe due to infectious organism,chronic diastolic heart failure, chronic kidney disease, HTN. Personal factors affecting pt at time of eval include: ambulating w/ assistive device and stairs to enter home.  The following objective measures performed on IE also reveal limitations: AM-PAC 6-Clicks: 16/74. Pt's clinical presentation is currently unstable/unpredictable seen in pt's presentation of advanced age, need for supervision assist w/ all phases of mobility when usually mobilizing independently, tolerance to only 10 feet of ambulation, ongoing medical assessment, and O2 desaturation with activity, hypotension . Overall, pt's rehab potential and prognosis to return to PLOF is good as impacted by objective findings, warranting pt to receive further skilled PT interventions to address identified impairments, activity limitation(s), and participation restriction(s). Goal for patient is to return home. Pt to benefit from continued PT tx to address deficits as defined above and maximize level of functional independent mobility and consistency in order for pt to increase safety and activity tolerance. From PT/mobility standpoint, recommendation at time of d/c would be home with outpatient rehabilitation pending progress in order to facilitate return to PLOF. PT Discharge Recommendation: Home with outpatient rehabilitation    See flowsheet documentation for full assessment.    Les Brown; PT, DPT

## 2023-10-19 NOTE — NUTRITION
10/19/23 1322   Biochemical Data,Medical Tests, and Procedures   Biochemical Data/Medical Tests/Procedures Lab values reviewed; Meds reviewed   Labs (Comment) 10/29/23 Ca 8.2   Meds (Comment) atorvastatin, pepcid, heparin, protonix   Nutrition-Focused Physical Exam   Nutrition-Focused Physical Exam Findings RN skin assessment reviewed;Edema; Wound  (+3 BL LE edema. Wound at perineum.)   Medical-Related Concerns CHF, acute renal failure on stage 3 CKD, GERD, LAMBERTO on CPAP, prostate cancer not on treatment   Current PO Intake   Current Diet Order Mechanical Soft, NTL   Current Meal Intake 50-75%   Estimated calorie intake compared to estimated need Nutrient needs are met. PES Statement   Functional (1) Swallowing difficulty NC-1.1   Related to Other (Comment)  (dysphagia)   As evidenced by: Need for modified consistency   Recommendations/Interventions   Malnutrition/BMI Present No   Summary Weight loss - MST 1. Presents with chest pain and SOB. Past medical history significant for CHF, acute renal failure on stage 3 CKD, GERD, LAMBERTO on CPAP, prostate cancer not on treatment. Weight history reviewed. Noted significant 15# weight loss in 3 months, 7.6% body weight. +3 BL LE edema. Wound at perineum. Prescribed a Mechanical Soft, NTL. Meal completion 50%. Met with pt at bedside. Pt is known to his RD from previous hospital admissions. Pt reports having a good appetite. Usually has 2-3 meals daily, depending on how well he sleeps. Consumes 1 cup thin consistency coffee/day. Otherwise utilizes thickening agent to thicken liquids to NTL consistency. Pt reports his family monitors fluid consumption to make sure fluids are safely thickened. He reports using straws only on occasion - gathers fluid in mouth, then intentionally swallows, describes not chugging. He reports sitting up for some time upon completion of a meal/beverage. Recommend ST consult. Continue PO diet per ST recommendation. RD to follow. Interventions/Recommendations Continue current diet order;Speech/swallow evaluation   Education Assessment   Education Education not indicated at this time   Patient Nutrition Goals   Goal Adequate intake

## 2023-10-20 ENCOUNTER — APPOINTMENT (OUTPATIENT)
Dept: PULMONOLOGY | Facility: HOSPITAL | Age: 78
End: 2023-10-20
Attending: INTERNAL MEDICINE
Payer: COMMERCIAL

## 2023-10-20 ENCOUNTER — APPOINTMENT (INPATIENT)
Dept: RADIOLOGY | Facility: HOSPITAL | Age: 78
DRG: 193 | End: 2023-10-20
Payer: COMMERCIAL

## 2023-10-20 LAB
ALBUMIN SERPL BCP-MCNC: 3.9 G/DL (ref 3.5–5)
ANION GAP SERPL CALCULATED.3IONS-SCNC: 6 MMOL/L
BACTERIA SPT RESP CULT: ABNORMAL
BACTERIA SPT RESP CULT: ABNORMAL
BUN SERPL-MCNC: 22 MG/DL (ref 5–25)
CALCIUM SERPL-MCNC: 8.6 MG/DL (ref 8.4–10.2)
CHLORIDE SERPL-SCNC: 103 MMOL/L (ref 96–108)
CO2 SERPL-SCNC: 30 MMOL/L (ref 21–32)
CREAT SERPL-MCNC: 1.05 MG/DL (ref 0.6–1.3)
ERYTHROCYTE [DISTWIDTH] IN BLOOD BY AUTOMATED COUNT: 16.1 % (ref 11.6–15.1)
GFR SERPL CREATININE-BSD FRML MDRD: 67 ML/MIN/1.73SQ M
GLUCOSE SERPL-MCNC: 148 MG/DL (ref 65–140)
GRAM STN SPEC: ABNORMAL
HCT VFR BLD AUTO: 42.7 % (ref 36.5–49.3)
HGB BLD-MCNC: 13 G/DL (ref 12–17)
MAGNESIUM SERPL-MCNC: 2.4 MG/DL (ref 1.9–2.7)
MCH RBC QN AUTO: 30.2 PG (ref 26.8–34.3)
MCHC RBC AUTO-ENTMCNC: 30.4 G/DL (ref 31.4–37.4)
MCV RBC AUTO: 99 FL (ref 82–98)
PHOSPHATE SERPL-MCNC: 3.5 MG/DL (ref 2.3–4.1)
PLATELET # BLD AUTO: 204 THOUSANDS/UL (ref 149–390)
PMV BLD AUTO: 11.4 FL (ref 8.9–12.7)
POTASSIUM SERPL-SCNC: 4.5 MMOL/L (ref 3.5–5.3)
PROCALCITONIN SERPL-MCNC: 0.09 NG/ML
RBC # BLD AUTO: 4.3 MILLION/UL (ref 3.88–5.62)
SODIUM SERPL-SCNC: 139 MMOL/L (ref 135–147)
WBC # BLD AUTO: 14.27 THOUSAND/UL (ref 4.31–10.16)

## 2023-10-20 PROCEDURE — 80069 RENAL FUNCTION PANEL: CPT | Performed by: STUDENT IN AN ORGANIZED HEALTH CARE EDUCATION/TRAINING PROGRAM

## 2023-10-20 PROCEDURE — 83735 ASSAY OF MAGNESIUM: CPT | Performed by: STUDENT IN AN ORGANIZED HEALTH CARE EDUCATION/TRAINING PROGRAM

## 2023-10-20 PROCEDURE — 99233 SBSQ HOSP IP/OBS HIGH 50: CPT | Performed by: STUDENT IN AN ORGANIZED HEALTH CARE EDUCATION/TRAINING PROGRAM

## 2023-10-20 PROCEDURE — 85027 COMPLETE CBC AUTOMATED: CPT | Performed by: STUDENT IN AN ORGANIZED HEALTH CARE EDUCATION/TRAINING PROGRAM

## 2023-10-20 PROCEDURE — 94640 AIRWAY INHALATION TREATMENT: CPT

## 2023-10-20 PROCEDURE — 94664 DEMO&/EVAL PT USE INHALER: CPT

## 2023-10-20 PROCEDURE — 84145 PROCALCITONIN (PCT): CPT | Performed by: STUDENT IN AN ORGANIZED HEALTH CARE EDUCATION/TRAINING PROGRAM

## 2023-10-20 PROCEDURE — 94760 N-INVAS EAR/PLS OXIMETRY 1: CPT

## 2023-10-20 PROCEDURE — 71045 X-RAY EXAM CHEST 1 VIEW: CPT

## 2023-10-20 RX ORDER — METHYLPREDNISOLONE SODIUM SUCCINATE 40 MG/ML
40 INJECTION, POWDER, LYOPHILIZED, FOR SOLUTION INTRAMUSCULAR; INTRAVENOUS EVERY 12 HOURS SCHEDULED
Status: COMPLETED | OUTPATIENT
Start: 2023-10-20 | End: 2023-10-22

## 2023-10-20 RX ORDER — CEFTRIAXONE 1 G/50ML
1000 INJECTION, SOLUTION INTRAVENOUS EVERY 24 HOURS
Status: DISCONTINUED | OUTPATIENT
Start: 2023-10-20 | End: 2023-10-20

## 2023-10-20 RX ORDER — MIDODRINE HYDROCHLORIDE 5 MG/1
10 TABLET ORAL
Status: COMPLETED | OUTPATIENT
Start: 2023-10-20 | End: 2023-10-20

## 2023-10-20 RX ORDER — FUROSEMIDE 10 MG/ML
40 INJECTION INTRAMUSCULAR; INTRAVENOUS ONCE
Status: COMPLETED | OUTPATIENT
Start: 2023-10-20 | End: 2023-10-20

## 2023-10-20 RX ORDER — CEFTRIAXONE 1 G/50ML
1000 INJECTION, SOLUTION INTRAVENOUS EVERY 24 HOURS
Status: DISCONTINUED | OUTPATIENT
Start: 2023-10-21 | End: 2023-10-21

## 2023-10-20 RX ORDER — ALBUMIN (HUMAN) 12.5 G/50ML
25 SOLUTION INTRAVENOUS ONCE
Status: COMPLETED | OUTPATIENT
Start: 2023-10-20 | End: 2023-10-20

## 2023-10-20 RX ORDER — METOPROLOL SUCCINATE 25 MG/1
25 TABLET, EXTENDED RELEASE ORAL DAILY
Status: DISCONTINUED | OUTPATIENT
Start: 2023-10-20 | End: 2023-10-20

## 2023-10-20 RX ORDER — FUROSEMIDE 10 MG/ML
40 INJECTION INTRAMUSCULAR; INTRAVENOUS ONCE
Status: DISCONTINUED | OUTPATIENT
Start: 2023-10-20 | End: 2023-10-20

## 2023-10-20 RX ADMIN — ASPIRIN 81 MG CHEWABLE TABLET 81 MG: 81 TABLET CHEWABLE at 21:31

## 2023-10-20 RX ADMIN — PANTOPRAZOLE SODIUM 20 MG: 20 TABLET, DELAYED RELEASE ORAL at 06:42

## 2023-10-20 RX ADMIN — LEVALBUTEROL HYDROCHLORIDE 1.25 MG: 1.25 SOLUTION RESPIRATORY (INHALATION) at 07:15

## 2023-10-20 RX ADMIN — BUDESONIDE 0.5 MG: 0.5 INHALANT ORAL at 20:15

## 2023-10-20 RX ADMIN — AZITHROMYCIN DIHYDRATE 500 MG: 250 TABLET ORAL at 15:10

## 2023-10-20 RX ADMIN — LEVALBUTEROL HYDROCHLORIDE 1.25 MG: 1.25 SOLUTION RESPIRATORY (INHALATION) at 20:15

## 2023-10-20 RX ADMIN — FORMOTEROL FUMARATE DIHYDRATE 20 MCG: 20 SOLUTION RESPIRATORY (INHALATION) at 20:51

## 2023-10-20 RX ADMIN — LEVALBUTEROL HYDROCHLORIDE 1.25 MG: 1.25 SOLUTION RESPIRATORY (INHALATION) at 13:48

## 2023-10-20 RX ADMIN — MIDODRINE HYDROCHLORIDE 10 MG: 5 TABLET ORAL at 11:32

## 2023-10-20 RX ADMIN — MIDODRINE HYDROCHLORIDE 10 MG: 5 TABLET ORAL at 17:09

## 2023-10-20 RX ADMIN — IPRATROPIUM BROMIDE 0.5 MG: 0.5 SOLUTION RESPIRATORY (INHALATION) at 07:15

## 2023-10-20 RX ADMIN — FUROSEMIDE 40 MG: 10 INJECTION, SOLUTION INTRAMUSCULAR; INTRAVENOUS at 12:46

## 2023-10-20 RX ADMIN — ATORVASTATIN CALCIUM 80 MG: 80 TABLET, FILM COATED ORAL at 17:09

## 2023-10-20 RX ADMIN — IPRATROPIUM BROMIDE 0.5 MG: 0.5 SOLUTION RESPIRATORY (INHALATION) at 13:48

## 2023-10-20 RX ADMIN — METOPROLOL TARTRATE 25 MG: 25 TABLET, FILM COATED ORAL at 09:32

## 2023-10-20 RX ADMIN — ALBUMIN (HUMAN) 25 G: 0.25 INJECTION, SOLUTION INTRAVENOUS at 11:31

## 2023-10-20 RX ADMIN — HEPARIN SODIUM 5000 UNITS: 5000 INJECTION INTRAVENOUS; SUBCUTANEOUS at 22:07

## 2023-10-20 RX ADMIN — GUAIFENESIN 1200 MG: 600 TABLET ORAL at 09:32

## 2023-10-20 RX ADMIN — Medication 12.5 MG: at 21:38

## 2023-10-20 RX ADMIN — FORMOTEROL FUMARATE DIHYDRATE 20 MCG: 20 SOLUTION RESPIRATORY (INHALATION) at 07:43

## 2023-10-20 RX ADMIN — METHYLPREDNISOLONE SODIUM SUCCINATE 40 MG: 40 INJECTION, POWDER, FOR SOLUTION INTRAMUSCULAR; INTRAVENOUS at 21:31

## 2023-10-20 RX ADMIN — FAMOTIDINE 20 MG: 20 TABLET, FILM COATED ORAL at 21:31

## 2023-10-20 RX ADMIN — IPRATROPIUM BROMIDE 0.5 MG: 0.5 SOLUTION RESPIRATORY (INHALATION) at 20:15

## 2023-10-20 RX ADMIN — GUAIFENESIN 1200 MG: 600 TABLET ORAL at 17:09

## 2023-10-20 RX ADMIN — GABAPENTIN 300 MG: 300 CAPSULE ORAL at 21:31

## 2023-10-20 RX ADMIN — BUDESONIDE 0.5 MG: 0.5 INHALANT ORAL at 07:30

## 2023-10-20 RX ADMIN — METHYLPREDNISOLONE SODIUM SUCCINATE 40 MG: 40 INJECTION, POWDER, FOR SOLUTION INTRAMUSCULAR; INTRAVENOUS at 06:42

## 2023-10-20 NOTE — PROGRESS NOTES
[address]  Progress Note  Name: Faraz Greene  MRN: [id number]  Unit/Bed#: -01 I Date of Admission: 10/18/2023   Date of Service: 10/20/2023 I Hospital Day: 1    Assessment/Plan   COPD with acute exacerbation [facility])  Assessment & Plan  Presents with increased work of breathing wheezing and coughing  Suspect patient is in COPD exacerbation secondary to pneumonia  Received Solu-Medrol 125 mg in the ER and DuoNeb treatment  Finished Solu-Medrol 40 mg every 8 hours for 2 days. Transition to solumedrol 40 bid today  Azithromycin day 3 of 3  Ipratropium and Xopenex nebulizers 3 times daily  Formoterol and budesonide nebulizers twice daily  Admits to having shortness of breath with exertion, will hold off on discharge for another 24 hours    Pneumonia of right lower lobe due to infectious organism  Assessment & Plan  39-year-old male patient with COPD on 3 L/min of oxygen presents with increased productive cough, shortness of breath, wheezing and increased O2 requirements up to 4L NC    CXR in ER showing New bilateral lower lung pneumonia. Received ceftriaxone and and azithromycin on admission  Hospital day 1: procal negative x 2, no leukocytosis.  Dc ceftriaxome    F/U blood cultures and sputum cultures  Supportive care      Chronic diastolic (congestive) heart failure (HCC)  Assessment & Plan  Wt Readings from Last 3 Encounters:   10/20/23 83.8 kg (184 lb 11.9 oz)   10/02/23 82.9 kg (182 lb 12.8 oz)   09/29/23 82.1 kg (181 lb)       -Transthoracic echocardiogram 8/18/2023 showing left ventricular systolic function moderately reduced estimated LVEF 35% with grade 1 diastolic dysfunction, mildly dilated and mildly reduced right ventricular systolic function with mild tricuspid regurgitation and aortic sclerosis     continue aspirin 81 mg daily, Jardiance 10 mg daily, metoprolol succinate 25 mg daily, potassium 20 mEq every 48 hours, Crestor 40 mg daily, Entresto 24-26 mg twice daily Patient's cardiologist discontinued aldactone 9/2023 due to borderline hypotension  Admits to having shortness of breath with exertion  Will lasix 40 IV x 1  Transition to home lasix 20 mg daily tomorrow  D/c toprol xl 25 mg daily, transition to lopressor 12.5 mg bid. In setting of hypotension. May need to stop entresto on discharge       Chronic respiratory failure with hypoxia (HCC)  Assessment & Plan  Chronic respiratory failure with hypoxia due to chronic COPD a/e/b need for continuous O2, normally at 3 LPM, on 4 LPM when admitted  Back to baseline O2 requirements    LAMBERTO on CPAP  Assessment & Plan  Continue cpap [facility]    Hypertension  Assessment & Plan  Continue home Toprol-XL    Coronary artery disease involving native coronary artery of native heart without angina pectoris  Assessment & Plan  Continue home aspirin 81 mg daily, metoprolol 12.5 mg twice daily, and resume home Crestor 40 mg daily             Chronic respiratory failure with hypoxia due to chronic COPD a/e/b need for continuous O2, normally at 3 LPM, currently at 4 LPM.     VTE Pharmacologic Prophylaxis:   Moderate Risk (Score 3-4) - Pharmacological DVT Prophylaxis Ordered: heparin. Patient Centered Rounds: I performed bedside rounds with nursing staff today. Discussions with Specialists or Other Care Team Provider: case management    Education and Discussions with Family / Patient: Attempted to update  (daughter) via phone. Left voicemail. Total Time Spent on Date of Encounter in care of patient: 35 mins. This time was spent on one or more of the following: performing physical exam; counseling and coordination of care; obtaining or reviewing history; documenting in the medical record; reviewing/ordering tests, medications or procedures; communicating with other healthcare professionals and discussing with patient's family/caregivers.     Current Length of Stay: 1 day(s)  Current Patient Status: Inpatient   Certification Statement: The patient will continue to require additional inpatient hospital stay due to copd exacerbation  Discharge Plan: Anticipate discharge tomorrow to discharge location to be determined pending rehab evaluations. Code Status: Level 2 - DNAR: but accepts endotracheal intubation    Subjective:   Patient seen examined at bedside. Admits to still having shortness of breath on exertion states that he's too short of breath to go home. Will continue with nebulizer treatments and steroids. Will also give an additional lasix 40 IV. Objective:     Vitals:   Temp (24hrs), Av.1 °F (36.2 °C), Min:96.3 °F (35.7 °C), Max:97.6 °F (36.4 °C)    Temp:  [96.3 °F (35.7 °C)-97.6 °F (36.4 °C)] 97.4 °F (36.3 °C)  HR:  [] 90  Resp:  [18-21] 19  BP: (88-98)/(56-59) 98/59  SpO2:  [92 %-100 %] 99 %  Body mass index is 28.09 kg/m². Input and Output Summary (last 24 hours): Intake/Output Summary (Last 24 hours) at 10/20/2023 1137  Last data filed at 10/20/2023 0830  Gross per 24 hour   Intake 240 ml   Output 1400 ml   Net -1160 ml         Physical Exam:   Physical Exam  Vitals and nursing note reviewed. Constitutional:       General: He is not in acute distress. Appearance: He is well-developed. HENT:      Head: Normocephalic and atraumatic. Eyes:      Conjunctiva/sclera: Conjunctivae normal.   Cardiovascular:      Rate and Rhythm: Normal rate and regular rhythm. Heart sounds: No murmur heard. Pulmonary:      Effort: Pulmonary effort is normal. No respiratory distress. Breath sounds: Wheezing present. Abdominal:      Palpations: Abdomen is soft. Tenderness: There is no abdominal tenderness. Musculoskeletal:         General: No swelling. Cervical back: Neck supple. Skin:     General: Skin is warm and dry. Capillary Refill: Capillary refill takes less than 2 seconds. Neurological:      Mental Status: He is alert. Mental status is at baseline.    Psychiatric:         Mood and Affect: Mood normal.          Additional Data:     Labs:  Results from last 7 days   Lab Units 10/20/23  0641 10/19/23  0601 10/18/23  1210   WBC Thousand/uL 14.27*   < > 6.77   HEMOGLOBIN g/dL 13.0   < > 13.6   HEMATOCRIT % 42.7   < > 44.0   PLATELETS Thousands/uL 204   < > 140*   NEUTROS PCT %  --   --  75   LYMPHS PCT %  --   --  13*   MONOS PCT %  --   --  8   EOS PCT %  --   --  4    < > = values in this interval not displayed. Results from last 7 days   Lab Units 10/20/23  0641 10/19/23  0513 10/18/23  1210   SODIUM mmol/L 139   < > 143   POTASSIUM mmol/L 4.5   < > 3.1*   CHLORIDE mmol/L 103   < > 105   CO2 mmol/L 30   < > 31   BUN mg/dL 22   < > 13   CREATININE mg/dL 1.05   < > 1.02   ANION GAP mmol/L 6   < > 7   CALCIUM mg/dL 8.6   < > 8.6   ALBUMIN g/dL 3.9   < > 3.6   TOTAL BILIRUBIN mg/dL  --   --  0.66   ALK PHOS U/L  --   --  53   ALT U/L  --   --  20   AST U/L  --   --  22   GLUCOSE RANDOM mg/dL 148*   < > 164*    < > = values in this interval not displayed. Results from last 7 days   Lab Units 10/20/23  0641 10/19/23  0513 10/18/23  1324 10/18/23  1210   LACTIC ACID mmol/L  --   --  1.7  --    PROCALCITONIN ng/ml 0.09 0.09  --  0.10         Lines/Drains:  Invasive Devices       Peripheral Intravenous Line  Duration             Peripheral IV 10/18/23 Right Antecubital 2 days                          Imaging: Reviewed radiology reports from this admission including: chest xray    Recent Cultures (last 7 days):   Results from last 7 days   Lab Units 10/18/23  1751 10/18/23  1714 10/18/23  1324 10/18/23  1323   BLOOD CULTURE   --   --  No Growth at 24 hrs. No Growth at 24 hrs. SPUTUM CULTURE  4+ Growth of  Commensal respiratory robi only; No significant growth of Staph aureus/MRSA or Pseudomonas aeruginosa.   --   --   --    GRAM STAIN RESULT  1+ Epithelial cells per low power field*  1+ Polys*  1+ Gram positive cocci in clusters*  --   --   --    LEGIONELLA URINARY ANTIGEN   --  Negative  --   --          Last 24 Hours Medication List:   Current Facility-Administered Medications   Medication Dose Route Frequency Provider Last Rate    acetaminophen  650 mg Oral Q6H PRN Pandi Todhe, DO      aspirin  81 mg Oral HS Pandi Todhe, DO      atorvastatin  80 mg Oral Daily With Dominguez Oil, DO      azithromycin  500 mg Oral Q24H Pandi Todhe, DO      benzonatate  100 mg Oral TID PRN Pandi Todhe, DO      budesonide  0.5 mg Nebulization Q12H Pandi Todhe, DO      cefTRIAXone  1,000 mg Intravenous Q24H Pandi Todhe, DO      cyanocobalamin  1,000 mcg Intramuscular Q30 Days Pandi Todhe, DO      famotidine  20 mg Oral HS Pandi Todhe, DO      formoterol  20 mcg Nebulization Q12H Pandi Todhe, DO      furosemide  40 mg Intravenous Once Pandi Todhe, DO      gabapentin  300 mg Oral HS Pandi Todhe, DO      guaiFENesin  1,200 mg Oral BID Pandi Todhe, DO      heparin (porcine)  5,000 Units Subcutaneous Q8H [address] [facility] Toe, DO      ipratropium  0.5 mg Nebulization TID Pandi Todhe, DO      levalbuterol  1.25 mg Nebulization TID [facility] Toe, DO      methylPREDNISolone sodium succinate  40 mg Intravenous Q12H [address] [facility] Toe, DO      metoprolol tartrate  12.5 mg Oral Q12H [address] [facility] Toe, DO      midodrine  10 mg Oral TID AC Pandi Toe, DO      pantoprazole  20 mg Oral Early Morning Pandi Toe, DO      sacubitril-valsartan  1 tablet Oral BID Briseyda Toterese, DO          Today, Patient Was Seen By: Tucker Herbert DO    **Please Note: This note may have been constructed using a voice recognition system. **

## 2023-10-20 NOTE — ASSESSMENT & PLAN NOTE
Wt Readings from Last 3 Encounters:   10/20/23 83.8 kg (184 lb 11.9 oz)   10/02/23 82.9 kg (182 lb 12.8 oz)   09/29/23 82.1 kg (181 lb)       -Transthoracic echocardiogram 8/18/2023 showing left ventricular systolic function moderately reduced estimated LVEF 35% with grade 1 diastolic dysfunction, mildly dilated and mildly reduced right ventricular systolic function with mild tricuspid regurgitation and aortic sclerosis     Pt hospitalized in may for dehydration/fredy where his lasix dose was reduced to 20 mg daily by nephrology  Patient's cardiologist discontinued aldactone 9/2023 due to borderline hypotension    On 10/21/2023 patient continued to have dyspnea on exertion even after continued nebulizer treatments and IV steroids thus decided to obtain CT chest showing  Septal thickening due to interstitial edema with small right and trace left effusion. Debris in the right lower lobe bronchi with atelectasis of the posterior basal right lower lobe. Minimal atelectasis of the posterior basal left lower lobe. Severe upper lobe predominant panlobular emphysema. Pulmonary artery enlargement which can be seen with pulmonary hypertension. Status post Lasix 40 IV daily for 3 days  Start Lasix 40 IV twice daily  Cardiology consulted   May have to tolerate higher baseline creatinine to achieve euvolemia    12/20 D/c toprol xl 25 mg daily, transition to lopressor 12.5 mg bid. In setting of hypotension.   May need to stop entresto on discharge

## 2023-10-20 NOTE — CASE MANAGEMENT
Case Management Progress Note    Patient name Viet Villa  Location /-01 MRN [id number]  : 1945 Date 10/20/2023       LOS (days): 1  Geometric Mean LOS (GMLOS) (days): 3.50  Days to GMLOS:2.5        OBJECTIVE:        Current admission status: Inpatient  Preferred Pharmacy:   [facility] [address], [address]  [address] [facility] Niraj FANG [address]  Phone: [phone] Fax: [phone] [address] [facility], [address] [facility]  [address] [place] [address]  Phone: [phone] Fax: [phone]    Primary Care Provider: Henny Garcia DO    Primary Insurance: [facility]  Secondary Insurance:     PROGRESS NOTE:    Per Gemini Nichole tentative discharge in 24 hrs.

## 2023-10-20 NOTE — PLAN OF CARE
Problem: PAIN - ADULT  Goal: Verbalizes/displays adequate comfort level or baseline comfort level  Description: Interventions:  - Encourage patient to monitor pain and request assistance  - Assess pain using appropriate pain scale  - Administer analgesics based on type and severity of pain and evaluate response  - Implement non-pharmacological measures as appropriate and evaluate response  - Consider cultural and social influences on pain and pain management  - Notify physician/advanced practitioner if interventions unsuccessful or patient reports new pain  Outcome: Progressing     Problem: INFECTION - ADULT  Goal: Absence or prevention of progression during hospitalization  Description: INTERVENTIONS:  - Assess and monitor for signs and symptoms of infection  - Monitor lab/diagnostic results  - Monitor all insertion sites, i.e. indwelling lines, tubes, and drains  - Monitor endotracheal if appropriate and nasal secretions for changes in amount and color  - [place] appropriate cooling/warming therapies per order  - Administer medications as ordered  - Instruct and encourage patient and family to use good hand hygiene technique  - Identify and instruct in appropriate isolation precautions for identified infection/condition  Outcome: Progressing     Problem: Prexisting or High Potential for Compromised Skin Integrity  Goal: Skin integrity is maintained or improved  Description: INTERVENTIONS:  - Identify patients at risk for skin breakdown  - Assess and monitor skin integrity  - Assess and monitor nutrition and hydration status  - Monitor labs   - Assess for incontinence   - Turn and reposition patient  - Assist with mobility/ambulation  - Relieve pressure over bony prominences  - Avoid friction and shearing  - Provide appropriate hygiene as needed including keeping skin clean and dry  - Evaluate need for skin moisturizer/barrier cream  - Collaborate with interdisciplinary team   - Patient/family teaching  - Consider wound care consult   Outcome: Progressing

## 2023-10-20 NOTE — PLAN OF CARE
Problem: MOBILITY - ADULT  Goal: Maintain or return to baseline ADL function  Description: INTERVENTIONS:  -  Assess patient's ability to carry out ADLs; assess patient's baseline for ADL function and identify physical deficits which impact ability to perform ADLs (bathing, care of mouth/teeth, toileting, grooming, dressing, etc.)  - Assess/evaluate cause of self-care deficits   - Assess range of motion  - Assess patient's mobility; develop plan if impaired  - Assess patient's need for assistive devices and provide as appropriate  - Encourage maximum independence but intervene and supervise when necessary  - Involve family in performance of ADLs  - Assess for home care needs following discharge   - Consider OT consult to assist with ADL evaluation and planning for discharge  - Provide patient education as appropriate  Outcome: Progressing  Goal: Maintains/Returns to pre admission functional level  Description: INTERVENTIONS:  - Perform BMAT or MOVE assessment daily.   - Set and communicate daily mobility goal to care team and patient/family/caregiver. - Collaborate with rehabilitation services on mobility goals if consulted  - Perform Range of Motion 3 times a day. - Reposition patient every 2 hours.   - Dangle patient 3 times a day  - Stand patient 3 times a day  - Ambulate patient 3 times a day  - Out of bed to chair 3 times a day   - Out of bed for meals 3 times a day  - Out of bed for toileting  - Record patient progress and toleration of activity level   Outcome: Progressing     Problem: PAIN - ADULT  Goal: Verbalizes/displays adequate comfort level or baseline comfort level  Description: Interventions:  - Encourage patient to monitor pain and request assistance  - Assess pain using appropriate pain scale  - Administer analgesics based on type and severity of pain and evaluate response  - Implement non-pharmacological measures as appropriate and evaluate response  - Consider cultural and social influences on pain and pain management  - Notify physician/advanced practitioner if interventions unsuccessful or patient reports new pain  Outcome: Progressing     Problem: INFECTION - ADULT  Goal: Absence or prevention of progression during hospitalization  Description: INTERVENTIONS:  - Assess and monitor for signs and symptoms of infection  - Monitor lab/diagnostic results  - Monitor all insertion sites, i.e. indwelling lines, tubes, and drains  - Monitor endotracheal if appropriate and nasal secretions for changes in amount and color  - [place] appropriate cooling/warming therapies per order  - Administer medications as ordered  - Instruct and encourage patient and family to use good hand hygiene technique  - Identify and instruct in appropriate isolation precautions for identified infection/condition  Outcome: Progressing  Goal: Absence of fever/infection during neutropenic period  Description: INTERVENTIONS:  - Monitor WBC    Outcome: Progressing     Problem: SAFETY ADULT  Goal: Maintain or return to baseline ADL function  Description: INTERVENTIONS:  -  Assess patient's ability to carry out ADLs; assess patient's baseline for ADL function and identify physical deficits which impact ability to perform ADLs (bathing, care of mouth/teeth, toileting, grooming, dressing, etc.)  - Assess/evaluate cause of self-care deficits   - Assess range of motion  - Assess patient's mobility; develop plan if impaired  - Assess patient's need for assistive devices and provide as appropriate  - Encourage maximum independence but intervene and supervise when necessary  - Involve family in performance of ADLs  - Assess for home care needs following discharge   - Consider OT consult to assist with ADL evaluation and planning for discharge  - Provide patient education as appropriate  Outcome: Progressing  Goal: Maintains/Returns to pre admission functional level  Description: INTERVENTIONS:  - Perform BMAT or MOVE assessment daily.   - Set and communicate daily mobility goal to care team and patient/family/caregiver. - Collaborate with rehabilitation services on mobility goals if consulted  - Perform Range of Motion 3 times a day. - Reposition patient every 2 hours. - Dangle patient 3 times a day  - Stand patient 3 times a day  - Ambulate patient 3 times a day  - Out of bed to chair 3 times a day   - Out of bed for meals 3 times a day  - Out of bed for toileting  - Record patient progress and toleration of activity level   Outcome: Progressing  Goal: Patient will remain free of falls  Description: INTERVENTIONS:  - Educate patient/family on patient safety including physical limitations  - Instruct patient to call for assistance with activity   - Consult OT/PT to assist with strengthening/mobility   - Keep Call bell within reach  - Keep bed low and locked with side rails adjusted as appropriate  - Keep care items and personal belongings within reach  - Initiate and maintain comfort rounds  - Make Fall Risk Sign visible to staff  - Offer Toileting every 4 Hours, in advance of need  - Initiate/Maintain bed alarm  - Obtain necessary fall risk management equipment: .   - Apply yellow socks and bracelet for high fall risk patients  - Consider moving patient to room near nurses station  Outcome: Progressing     Problem: DISCHARGE PLANNING  Goal: Discharge to home or other facility with appropriate resources  Description: INTERVENTIONS:  - Identify barriers to discharge w/patient and caregiver  - Arrange for needed discharge resources and transportation as appropriate  - Identify discharge learning needs (meds, wound care, etc.)  - Arrange for interpretive services to assist at discharge as needed  - Refer to Case Management Department for coordinating discharge planning if the patient needs post-hospital services based on physician/advanced practitioner order or complex needs related to functional status, cognitive ability, or social support system  Outcome: Progressing     Problem: Knowledge Deficit  Goal: Patient/family/caregiver demonstrates understanding of disease process, treatment plan, medications, and discharge instructions  Description: Complete learning assessment and assess knowledge base.   Interventions:  - Provide teaching at level of understanding  - Provide teaching via preferred learning methods  Outcome: Progressing     Problem: Prexisting or High Potential for Compromised Skin Integrity  Goal: Skin integrity is maintained or improved  Description: INTERVENTIONS:  - Identify patients at risk for skin breakdown  - Assess and monitor skin integrity  - Assess and monitor nutrition and hydration status  - Monitor labs   - Assess for incontinence   - Turn and reposition patient  - Assist with mobility/ambulation  - Relieve pressure over bony prominences  - Avoid friction and shearing  - Provide appropriate hygiene as needed including keeping skin clean and dry  - Evaluate need for skin moisturizer/barrier cream  - Collaborate with interdisciplinary team   - Patient/family teaching  - Consider wound care consult   Outcome: Progressing

## 2023-10-20 NOTE — ASSESSMENT & PLAN NOTE
Presents with increased work of breathing wheezing and coughing  Suspect patient is in COPD exacerbation secondary to pneumonia  Received Solu-Medrol 125 mg in the ER and DuoNeb treatment  Patient improved with IV steroids.   Now on oral prednisone, will continue to taper  Finished 3 days of Azithromycin  Ipratropium and Xopenex nebulizers 3 times daily  Continue nebulizers    Pulmonology input appreciated

## 2023-10-20 NOTE — ASSESSMENT & PLAN NOTE
74-year-old male patient with COPD on 3 L/min of oxygen presents with increased productive cough, shortness of breath, wheezing and increased O2 requirements up to 4L NC    CXR in ER showing New bilateral lower lung pneumonia. Received ceftriaxone and and azithromycin on admission    Blood cultures from admission positive for gram-negative rods and gram-positive cocci. We will continue ceftriaxone. Follow-up final cultures and sensitivities. Consult infectious disease. Repeat blood cultures in the morning.   Supportive care

## 2023-10-20 NOTE — ASSESSMENT & PLAN NOTE
Lab Results   Component Value Date    EGFR 57 10/21/2023    EGFR 67 10/20/2023    EGFR 73 10/19/2023    CREATININE 1.19 10/21/2023    CREATININE 1.05 10/20/2023    CREATININE 0.98 10/19/2023     Baseline Cr 1-1.2  At baseline

## 2023-10-21 ENCOUNTER — APPOINTMENT (INPATIENT)
Dept: CT IMAGING | Facility: HOSPITAL | Age: 78
DRG: 193 | End: 2023-10-21
Payer: COMMERCIAL

## 2023-10-21 LAB
ALBUMIN SERPL BCP-MCNC: 4 G/DL (ref 3.5–5)
ANION GAP SERPL CALCULATED.3IONS-SCNC: 7 MMOL/L
BACTERIA BLD CULT: NORMAL
BNP SERPL-MCNC: 1892 PG/ML (ref 0–100)
BUN SERPL-MCNC: 27 MG/DL (ref 5–25)
CALCIUM SERPL-MCNC: 8.7 MG/DL (ref 8.4–10.2)
CHLORIDE SERPL-SCNC: 101 MMOL/L (ref 96–108)
CO2 SERPL-SCNC: 31 MMOL/L (ref 21–32)
CREAT SERPL-MCNC: 1.19 MG/DL (ref 0.6–1.3)
ERYTHROCYTE [DISTWIDTH] IN BLOOD BY AUTOMATED COUNT: 16.3 % (ref 11.6–15.1)
GFR SERPL CREATININE-BSD FRML MDRD: 57 ML/MIN/1.73SQ M
GLUCOSE SERPL-MCNC: 162 MG/DL (ref 65–140)
HCT VFR BLD AUTO: 42.3 % (ref 36.5–49.3)
HGB BLD-MCNC: 13 G/DL (ref 12–17)
MAGNESIUM SERPL-MCNC: 2.4 MG/DL (ref 1.9–2.7)
MCH RBC QN AUTO: 30.7 PG (ref 26.8–34.3)
MCHC RBC AUTO-ENTMCNC: 30.7 G/DL (ref 31.4–37.4)
MCV RBC AUTO: 100 FL (ref 82–98)
PHOSPHATE SERPL-MCNC: 3.8 MG/DL (ref 2.3–4.1)
PLATELET # BLD AUTO: 213 THOUSANDS/UL (ref 149–390)
PMV BLD AUTO: 11.2 FL (ref 8.9–12.7)
POTASSIUM SERPL-SCNC: 4.5 MMOL/L (ref 3.5–5.3)
RBC # BLD AUTO: 4.23 MILLION/UL (ref 3.88–5.62)
SODIUM SERPL-SCNC: 139 MMOL/L (ref 135–147)
WBC # BLD AUTO: 14.41 THOUSAND/UL (ref 4.31–10.16)

## 2023-10-21 PROCEDURE — 94760 N-INVAS EAR/PLS OXIMETRY 1: CPT

## 2023-10-21 PROCEDURE — 83880 ASSAY OF NATRIURETIC PEPTIDE: CPT | Performed by: STUDENT IN AN ORGANIZED HEALTH CARE EDUCATION/TRAINING PROGRAM

## 2023-10-21 PROCEDURE — G1004 CDSM NDSC: HCPCS

## 2023-10-21 PROCEDURE — 71250 CT THORAX DX C-: CPT

## 2023-10-21 PROCEDURE — 85027 COMPLETE CBC AUTOMATED: CPT | Performed by: STUDENT IN AN ORGANIZED HEALTH CARE EDUCATION/TRAINING PROGRAM

## 2023-10-21 PROCEDURE — 94640 AIRWAY INHALATION TREATMENT: CPT

## 2023-10-21 PROCEDURE — 94664 DEMO&/EVAL PT USE INHALER: CPT

## 2023-10-21 PROCEDURE — 80069 RENAL FUNCTION PANEL: CPT | Performed by: STUDENT IN AN ORGANIZED HEALTH CARE EDUCATION/TRAINING PROGRAM

## 2023-10-21 PROCEDURE — 83735 ASSAY OF MAGNESIUM: CPT | Performed by: STUDENT IN AN ORGANIZED HEALTH CARE EDUCATION/TRAINING PROGRAM

## 2023-10-21 PROCEDURE — 94669 MECHANICAL CHEST WALL OSCILL: CPT

## 2023-10-21 PROCEDURE — 99233 SBSQ HOSP IP/OBS HIGH 50: CPT | Performed by: STUDENT IN AN ORGANIZED HEALTH CARE EDUCATION/TRAINING PROGRAM

## 2023-10-21 RX ORDER — ALBUTEROL SULFATE 2.5 MG/3ML
SOLUTION RESPIRATORY (INHALATION)
Status: COMPLETED
Start: 2023-10-21 | End: 2023-10-21

## 2023-10-21 RX ORDER — FUROSEMIDE 20 MG/1
20 TABLET ORAL DAILY
Status: DISCONTINUED | OUTPATIENT
Start: 2023-10-22 | End: 2023-10-21

## 2023-10-21 RX ORDER — DIPHENHYDRAMINE HCL 25 MG
50 TABLET ORAL
Status: DISCONTINUED | OUTPATIENT
Start: 2023-10-21 | End: 2023-10-21

## 2023-10-21 RX ORDER — FUROSEMIDE 10 MG/ML
40 INJECTION INTRAMUSCULAR; INTRAVENOUS ONCE
Status: DISCONTINUED | OUTPATIENT
Start: 2023-10-21 | End: 2023-10-21

## 2023-10-21 RX ORDER — FUROSEMIDE 10 MG/ML
40 INJECTION INTRAMUSCULAR; INTRAVENOUS
Status: COMPLETED | OUTPATIENT
Start: 2023-10-22 | End: 2023-10-22

## 2023-10-21 RX ORDER — FUROSEMIDE 20 MG/1
20 TABLET ORAL DAILY
Status: DISCONTINUED | OUTPATIENT
Start: 2023-10-23 | End: 2023-10-22

## 2023-10-21 RX ORDER — ACETYLCYSTEINE 200 MG/ML
3 SOLUTION ORAL; RESPIRATORY (INHALATION)
Status: DISCONTINUED | OUTPATIENT
Start: 2023-10-21 | End: 2023-10-21

## 2023-10-21 RX ORDER — ACETYLCYSTEINE 200 MG/ML
3 SOLUTION ORAL; RESPIRATORY (INHALATION)
Status: DISCONTINUED | OUTPATIENT
Start: 2023-10-21 | End: 2023-10-22

## 2023-10-21 RX ORDER — BENZONATATE 100 MG/1
100 CAPSULE ORAL 3 TIMES DAILY
Status: DISCONTINUED | OUTPATIENT
Start: 2023-10-21 | End: 2023-10-27 | Stop reason: HOSPADM

## 2023-10-21 RX ORDER — FUROSEMIDE 10 MG/ML
40 INJECTION INTRAMUSCULAR; INTRAVENOUS
Status: DISCONTINUED | OUTPATIENT
Start: 2023-10-21 | End: 2023-10-21

## 2023-10-21 RX ORDER — FUROSEMIDE 10 MG/ML
40 INJECTION INTRAMUSCULAR; INTRAVENOUS ONCE
Status: COMPLETED | OUTPATIENT
Start: 2023-10-21 | End: 2023-10-21

## 2023-10-21 RX ORDER — TRAZODONE HYDROCHLORIDE 50 MG/1
50 TABLET ORAL
Status: DISCONTINUED | OUTPATIENT
Start: 2023-10-21 | End: 2023-10-22

## 2023-10-21 RX ORDER — ALBUTEROL SULFATE 90 UG/1
2 AEROSOL, METERED RESPIRATORY (INHALATION) EVERY 4 HOURS PRN
Status: DISCONTINUED | OUTPATIENT
Start: 2023-10-21 | End: 2023-10-27 | Stop reason: HOSPADM

## 2023-10-21 RX ORDER — ALBUTEROL SULFATE 2.5 MG/3ML
2.5 SOLUTION RESPIRATORY (INHALATION) EVERY 6 HOURS PRN
Status: DISCONTINUED | OUTPATIENT
Start: 2023-10-21 | End: 2023-10-27 | Stop reason: HOSPADM

## 2023-10-21 RX ADMIN — BUDESONIDE 0.5 MG: 0.5 INHALANT ORAL at 07:11

## 2023-10-21 RX ADMIN — METHYLPREDNISOLONE SODIUM SUCCINATE 40 MG: 40 INJECTION, POWDER, FOR SOLUTION INTRAMUSCULAR; INTRAVENOUS at 20:59

## 2023-10-21 RX ADMIN — TRAZODONE HYDROCHLORIDE 50 MG: 50 TABLET ORAL at 22:31

## 2023-10-21 RX ADMIN — FUROSEMIDE 40 MG: 10 INJECTION, SOLUTION INTRAMUSCULAR; INTRAVENOUS at 10:09

## 2023-10-21 RX ADMIN — FORMOTEROL FUMARATE DIHYDRATE 20 MCG: 20 SOLUTION RESPIRATORY (INHALATION) at 07:33

## 2023-10-21 RX ADMIN — FAMOTIDINE 20 MG: 20 TABLET, FILM COATED ORAL at 21:01

## 2023-10-21 RX ADMIN — LEVALBUTEROL HYDROCHLORIDE 1.25 MG: 1.25 SOLUTION RESPIRATORY (INHALATION) at 13:24

## 2023-10-21 RX ADMIN — IPRATROPIUM BROMIDE 0.5 MG: 0.5 SOLUTION RESPIRATORY (INHALATION) at 20:09

## 2023-10-21 RX ADMIN — BUDESONIDE 0.5 MG: 0.5 INHALANT ORAL at 20:09

## 2023-10-21 RX ADMIN — GUAIFENESIN 1200 MG: 600 TABLET ORAL at 17:23

## 2023-10-21 RX ADMIN — ATORVASTATIN CALCIUM 80 MG: 80 TABLET, FILM COATED ORAL at 17:23

## 2023-10-21 RX ADMIN — IPRATROPIUM BROMIDE 0.5 MG: 0.5 SOLUTION RESPIRATORY (INHALATION) at 07:11

## 2023-10-21 RX ADMIN — METHYLPREDNISOLONE SODIUM SUCCINATE 40 MG: 40 INJECTION, POWDER, FOR SOLUTION INTRAMUSCULAR; INTRAVENOUS at 08:35

## 2023-10-21 RX ADMIN — ALBUTEROL SULFATE 2.5 MG: 2.5 SOLUTION RESPIRATORY (INHALATION) at 06:07

## 2023-10-21 RX ADMIN — FORMOTEROL FUMARATE DIHYDRATE 20 MCG: 20 SOLUTION RESPIRATORY (INHALATION) at 20:33

## 2023-10-21 RX ADMIN — DIPHENHYDRAMINE HYDROCHLORIDE 50 MG: 25 TABLET ORAL at 01:35

## 2023-10-21 RX ADMIN — GABAPENTIN 300 MG: 300 CAPSULE ORAL at 21:01

## 2023-10-21 RX ADMIN — HEPARIN SODIUM 5000 UNITS: 5000 INJECTION INTRAVENOUS; SUBCUTANEOUS at 22:31

## 2023-10-21 RX ADMIN — BENZONATATE 100 MG: 100 CAPSULE ORAL at 17:23

## 2023-10-21 RX ADMIN — PANTOPRAZOLE SODIUM 20 MG: 20 TABLET, DELAYED RELEASE ORAL at 05:42

## 2023-10-21 RX ADMIN — Medication 12.5 MG: at 21:01

## 2023-10-21 RX ADMIN — BENZONATATE 100 MG: 100 CAPSULE ORAL at 21:01

## 2023-10-21 RX ADMIN — HEPARIN SODIUM 5000 UNITS: 5000 INJECTION INTRAVENOUS; SUBCUTANEOUS at 16:10

## 2023-10-21 RX ADMIN — BENZONATATE 100 MG: 100 CAPSULE ORAL at 10:09

## 2023-10-21 RX ADMIN — Medication 12.5 MG: at 08:35

## 2023-10-21 RX ADMIN — ASPIRIN 81 MG CHEWABLE TABLET 81 MG: 81 TABLET CHEWABLE at 21:01

## 2023-10-21 RX ADMIN — ACETYLCYSTEINE 600 MG: 200 SOLUTION ORAL; RESPIRATORY (INHALATION) at 13:24

## 2023-10-21 RX ADMIN — LEVALBUTEROL HYDROCHLORIDE 1.25 MG: 1.25 SOLUTION RESPIRATORY (INHALATION) at 19:41

## 2023-10-21 RX ADMIN — HEPARIN SODIUM 5000 UNITS: 5000 INJECTION INTRAVENOUS; SUBCUTANEOUS at 05:42

## 2023-10-21 RX ADMIN — LEVALBUTEROL HYDROCHLORIDE 1.25 MG: 1.25 SOLUTION RESPIRATORY (INHALATION) at 07:11

## 2023-10-21 RX ADMIN — IPRATROPIUM BROMIDE 0.5 MG: 0.5 SOLUTION RESPIRATORY (INHALATION) at 13:24

## 2023-10-21 RX ADMIN — ACETYLCYSTEINE 600 MG: 200 SOLUTION ORAL; RESPIRATORY (INHALATION) at 19:41

## 2023-10-21 NOTE — PROGRESS NOTES
[address]  Progress Note  Name: Meenakshi Kaye  MRN: [id number]  Unit/Bed#: -01 I Date of Admission: 10/18/2023   Date of Service: 10/21/2023 I Hospital Day: 2    Assessment/Plan   * COPD with acute exacerbation ([address])  Assessment & Plan  Presents with increased work of breathing wheezing and coughing  Suspect patient is in COPD exacerbation secondary to pneumonia  Received Solu-Medrol 125 mg in the ER and DuoNeb treatment  Finished Solu-Medrol 40 mg every 8 hours for 2 days. Transition to solumedrol 40 bid day 2  Azithromycin day 3 of 3  Ipratropium and Xopenex nebulizers 3 times daily  Formoterol and budesonide nebulizers twice daily  Admits to having shortness of breath with exertion, will hold off on discharge for another 24 hours  Will obtain CT chest due to no improvement in shortness of breath    Pneumonia of right lower lobe due to infectious organism  Assessment & Plan  50-year-old male patient with COPD on 3 L/min of oxygen presents with increased productive cough, shortness of breath, wheezing and increased O2 requirements up to 4L NC    CXR in ER showing New bilateral lower lung pneumonia. Received ceftriaxone and and azithromycin on admission  Procal negative x 2, no leukocytosis.  Stopped ceftriaxone after 2 doses    F/U blood cultures and sputum cultures  Supportive care      Chronic diastolic (congestive) heart failure (HCC)  Assessment & Plan  Wt Readings from Last 3 Encounters:   10/20/23 83.8 kg (184 lb 11.9 oz)   10/02/23 82.9 kg (182 lb 12.8 oz)   09/29/23 82.1 kg (181 lb)       -Transthoracic echocardiogram 8/18/2023 showing left ventricular systolic function moderately reduced estimated LVEF 35% with grade 1 diastolic dysfunction, mildly dilated and mildly reduced right ventricular systolic function with mild tricuspid regurgitation and aortic sclerosis     continue aspirin 81 mg daily, Jardiance 10 mg daily, metoprolol succinate 25 mg daily, potassium 20 mEq every 48 hours, Crestor 40 mg daily, Entresto 24-26 mg twice daily   Patient's cardiologist discontinued aldactone 9/2023 due to borderline hypotension  Admits to having shortness of breath with exertion  Placed on lasix 40 IV daily for 3 days  Transition to home lasix 20 mg daily tomorrow  12/20 D/c toprol xl 25 mg daily, transition to lopressor 12.5 mg bid. In setting of hypotension. May need to stop entresto on discharge       Chronic kidney disease, stage 3a [facility])  Assessment & Plan  Lab Results   Component Value Date    EGFR 57 10/21/2023    EGFR 67 10/20/2023    EGFR 73 10/19/2023    CREATININE 1.19 10/21/2023    CREATININE 1.05 10/20/2023    CREATININE 0.98 10/19/2023     Baseline Cr 1-1.2  At baseline    Chronic respiratory failure with hypoxia [facility])  Assessment & Plan  Chronic respiratory failure with hypoxia due to chronic COPD a/e/b need for continuous O2, normally at 3 LPM, on 4 LPM when admitted  Back to baseline O2 requirements    LAMBERTO on CPAP  Assessment & Plan  Continue cpap qhs    Hypertension  Assessment & Plan  Continue home Toprol-XL    Coronary artery disease involving native coronary artery of native heart without angina pectoris  Assessment & Plan  Continue home aspirin 81 mg daily, metoprolol 12.5 mg twice daily, and resume home Crestor 40 mg daily             Chronic respiratory failure with hypoxia due to chronic COPD a/e/b need for continuous O2, normally at 3 LPM, currently at 4 LPM.     VTE Pharmacologic Prophylaxis: VTE Score: 3 Moderate Risk (Score 3-4) - Pharmacological DVT Prophylaxis Ordered: heparin. Patient Centered Rounds: I performed bedside rounds with nursing staff today. Discussions with Specialists or Other Care Team Provider: case management    Education and Discussions with Family / Patient: Patient declined call to . Total Time Spent on Date of Encounter in care of patient: 35 mins.  This time was spent on one or more of the following: performing physical exam; counseling and coordination of care; obtaining or reviewing history; documenting in the medical record; reviewing/ordering tests, medications or procedures; communicating with other healthcare professionals and discussing with patient's family/caregivers. Current Length of Stay: 2 day(s)  Current Patient Status: Inpatient   Certification Statement: The patient will continue to require additional inpatient hospital stay due to copd exacerbation  Discharge Plan: Anticipate discharge in 24-48 hrs to home. Code Status: Level 2 - DNAR: but accepts endotracheal intubation    Subjective:   Patient seen examined at bedside. Admits to still having shortness of breath on exertion will obtain CT chest.    Objective:     Vitals:   Temp (24hrs), Av.2 °F (36.2 °C), Min:97 °F (36.1 °C), Max:97.5 °F (36.4 °C)    Temp:  [97 °F (36.1 °C)-97.5 °F (36.4 °C)] 97 °F (36.1 °C)  HR:  [] 100  Resp:  [17-19] 17  BP: ()/(55-69) 100/55  SpO2:  [94 %-99 %] 99 %  Body mass index is 28.22 kg/m². Input and Output Summary (last 24 hours): Intake/Output Summary (Last 24 hours) at 10/21/2023 1009  Last data filed at 10/21/2023 0845  Gross per 24 hour   Intake 540 ml   Output 850 ml   Net -310 ml       Physical Exam:   Physical Exam  Vitals and nursing note reviewed. Constitutional:       General: He is not in acute distress. Appearance: He is well-developed. HENT:      Head: Normocephalic and atraumatic. Eyes:      Conjunctiva/sclera: Conjunctivae normal.   Cardiovascular:      Rate and Rhythm: Normal rate and regular rhythm. Heart sounds: No murmur heard. Pulmonary:      Effort: Pulmonary effort is normal. No respiratory distress. Breath sounds: Wheezing present. Abdominal:      Palpations: Abdomen is soft. Tenderness: There is no abdominal tenderness. Musculoskeletal:         General: No swelling. Cervical back: Neck supple. Skin:     General: Skin is warm and dry. Capillary Refill: Capillary refill takes less than 2 seconds. Neurological:      Mental Status: He is alert. Mental status is at baseline. Psychiatric:         Mood and Affect: Mood normal.          Additional Data:     Labs:  Results from last 7 days   Lab Units 10/21/23  0445 10/19/23  0601 10/18/23  1210   WBC Thousand/uL 14.41*   < > 6.77   HEMOGLOBIN g/dL 13.0   < > 13.6   HEMATOCRIT % 42.3   < > 44.0   PLATELETS Thousands/uL 213   < > 140*   NEUTROS PCT %  --   --  75   LYMPHS PCT %  --   --  13*   MONOS PCT %  --   --  8   EOS PCT %  --   --  4    < > = values in this interval not displayed. Results from last 7 days   Lab Units 10/21/23  0445 10/19/23  0513 10/18/23  1210   SODIUM mmol/L 139   < > 143   POTASSIUM mmol/L 4.5   < > 3.1*   CHLORIDE mmol/L 101   < > 105   CO2 mmol/L 31   < > 31   BUN mg/dL 27*   < > 13   CREATININE mg/dL 1.19   < > 1.02   ANION GAP mmol/L 7   < > 7   CALCIUM mg/dL 8.7   < > 8.6   ALBUMIN g/dL 4.0   < > 3.6   TOTAL BILIRUBIN mg/dL  --   --  0.66   ALK PHOS U/L  --   --  53   ALT U/L  --   --  20   AST U/L  --   --  22   GLUCOSE RANDOM mg/dL 162*   < > 164*    < > = values in this interval not displayed. Results from last 7 days   Lab Units 10/20/23  0641 10/19/23  0513 10/18/23  1324 10/18/23  1210   LACTIC ACID mmol/L  --   --  1.7  --    PROCALCITONIN ng/ml 0.09 0.09  --  0.10       Lines/Drains:  Invasive Devices       Peripheral Intravenous Line  Duration             Peripheral IV 10/18/23 Right Antecubital 3 days                          Imaging: Reviewed radiology reports from this admission including: chest xray    Recent Cultures (last 7 days):   Results from last 7 days   Lab Units 10/18/23  1751 10/18/23  1714 10/18/23  1324 10/18/23  1323   BLOOD CULTURE   --   --  No Growth at 48 hrs. No Growth at 48 hrs. SPUTUM CULTURE  4+ Growth of  Commensal respiratory robi only; No significant growth of Staph aureus/MRSA or Pseudomonas aeruginosa. --   --   --    GRAM STAIN RESULT  1+ Epithelial cells per low power field*  1+ Polys*  1+ Gram positive cocci in clusters*  --   --   --    LEGIONELLA URINARY ANTIGEN   --  Negative  --   --        Last 24 Hours Medication List:   Current Facility-Administered Medications   Medication Dose Route Frequency Provider Last Rate    acetaminophen  650 mg Oral Q6H PRN Pandi Todhe, DO      acetylcysteine  3 mL Nebulization TID Pandi Todhe, DO      albuterol  2 puff Inhalation Q4H PRN Pandi Todhe, DO      albuterol  2.5 mg Nebulization Q6H PRN Pandi Todhe, DO      aspirin  81 mg Oral HS Pandi Todhe, DO      atorvastatin  80 mg Oral Daily With Dominguez Oil, DO      benzonatate  100 mg Oral TID Pandi Todhe, DO      budesonide  0.5 mg Nebulization Q12H Pandi Todhe, DO      cyanocobalamin  1,000 mcg Intramuscular Q30 Days Pandi Todhe, DO      famotidine  20 mg Oral HS Pandi Todhe, DO      formoterol  20 mcg Nebulization Q12H Pandi Todhe, DO      furosemide  40 mg Intravenous Once Pandi Todhe, DO      [START ON 10/22/2023] furosemide  20 mg Oral Daily Pandi Todhe, DO      gabapentin  300 mg Oral HS Pandi Todhe, DO      guaiFENesin  1,200 mg Oral BID Pandi Todhe, DO      heparin (porcine)  5,000 Units Subcutaneous Q8H [facility] Pandi Todhe, DO      ipratropium  0.5 mg Nebulization TID Pandi Todhe, DO      levalbuterol  1.25 mg Nebulization TID Pandi Todhe, DO      methylPREDNISolone sodium succinate  40 mg Intravenous Q12H [facility] Pandi Todhe, DO      metoprolol tartrate  12.5 mg Oral Q12H [facility] Pandi Todhe, DO      pantoprazole  20 mg Oral Early Morning Pandi Todhe, DO      sacubitril-valsartan  1 tablet Oral BID Pandi Todhe, DO      traZODone  50 mg Oral HS PRN Briseydai Toterese, DO          Today, Patient Was Seen By: Zada Brittle, DO    **Please Note: This note may have been constructed using a voice recognition system. **

## 2023-10-21 NOTE — PLAN OF CARE
Problem: PAIN - ADULT  Goal: Verbalizes/displays adequate comfort level or baseline comfort level  Description: Interventions:  - Encourage patient to monitor pain and request assistance  - Assess pain using appropriate pain scale  - Administer analgesics based on type and severity of pain and evaluate response  - Implement non-pharmacological measures as appropriate and evaluate response  - Consider cultural and social influences on pain and pain management  - Notify physician/advanced practitioner if interventions unsuccessful or patient reports new pain  Outcome: Progressing     Problem: INFECTION - ADULT  Goal: Absence or prevention of progression during hospitalization  Description: INTERVENTIONS:  - Assess and monitor for signs and symptoms of infection  - Monitor lab/diagnostic results  - Monitor all insertion sites, i.e. indwelling lines, tubes, and drains  - Monitor endotracheal if appropriate and nasal secretions for changes in amount and color  - [place] appropriate cooling/warming therapies per order  - Administer medications as ordered  - Instruct and encourage patient and family to use good hand hygiene technique  - Identify and instruct in appropriate isolation precautions for identified infection/condition  Outcome: Progressing

## 2023-10-21 NOTE — RESPIRATORY THERAPY NOTE
Resp care   10/21/23 9394   Respiratory Assessment   Resp Comments Pt complaining of increased SOB and feels his breathing is "not right".    Additional Assessments   Pulse 99   Respirations 17   SpO2 99 %

## 2023-10-21 NOTE — PLAN OF CARE
Problem: INFECTION - ADULT  Goal: Absence or prevention of progression during hospitalization  Description: INTERVENTIONS:  - Assess and monitor for signs and symptoms of infection  - Monitor lab/diagnostic results  - Monitor all insertion sites, i.e. indwelling lines, tubes, and drains  - Monitor endotracheal if appropriate and nasal secretions for changes in amount and color  - [place] appropriate cooling/warming therapies per order  - Administer medications as ordered  - Instruct and encourage patient and family to use good hand hygiene technique  - Identify and instruct in appropriate isolation precautions for identified infection/condition  Outcome: Progressing  Goal: Absence of fever/infection during neutropenic period  Description: INTERVENTIONS:  - Monitor WBC    Outcome: Progressing

## 2023-10-22 PROBLEM — I50.21 ACUTE SYSTOLIC CONGESTIVE HEART FAILURE (HCC): Status: ACTIVE | Noted: 2023-10-22

## 2023-10-22 LAB
ALBUMIN SERPL BCP-MCNC: 3.9 G/DL (ref 3.5–5)
ANION GAP SERPL CALCULATED.3IONS-SCNC: 8 MMOL/L
BUN SERPL-MCNC: 29 MG/DL (ref 5–25)
CALCIUM SERPL-MCNC: 8.7 MG/DL (ref 8.4–10.2)
CHLORIDE SERPL-SCNC: 100 MMOL/L (ref 96–108)
CO2 SERPL-SCNC: 33 MMOL/L (ref 21–32)
CREAT SERPL-MCNC: 1.15 MG/DL (ref 0.6–1.3)
ERYTHROCYTE [DISTWIDTH] IN BLOOD BY AUTOMATED COUNT: 16.2 % (ref 11.6–15.1)
GFR SERPL CREATININE-BSD FRML MDRD: 60 ML/MIN/1.73SQ M
GLUCOSE SERPL-MCNC: 149 MG/DL (ref 65–140)
HCT VFR BLD AUTO: 43.9 % (ref 36.5–49.3)
HGB BLD-MCNC: 13.5 G/DL (ref 12–17)
MAGNESIUM SERPL-MCNC: 2.5 MG/DL (ref 1.9–2.7)
MCH RBC QN AUTO: 30.6 PG (ref 26.8–34.3)
MCHC RBC AUTO-ENTMCNC: 30.8 G/DL (ref 31.4–37.4)
MCV RBC AUTO: 100 FL (ref 82–98)
PHOSPHATE SERPL-MCNC: 4.1 MG/DL (ref 2.3–4.1)
PLATELET # BLD AUTO: 207 THOUSANDS/UL (ref 149–390)
PMV BLD AUTO: 11.1 FL (ref 8.9–12.7)
POTASSIUM SERPL-SCNC: 4.1 MMOL/L (ref 3.5–5.3)
RBC # BLD AUTO: 4.41 MILLION/UL (ref 3.88–5.62)
SODIUM SERPL-SCNC: 141 MMOL/L (ref 135–147)
WBC # BLD AUTO: 11.36 THOUSAND/UL (ref 4.31–10.16)

## 2023-10-22 PROCEDURE — 80069 RENAL FUNCTION PANEL: CPT | Performed by: STUDENT IN AN ORGANIZED HEALTH CARE EDUCATION/TRAINING PROGRAM

## 2023-10-22 PROCEDURE — 99223 1ST HOSP IP/OBS HIGH 75: CPT | Performed by: INTERNAL MEDICINE

## 2023-10-22 PROCEDURE — 85027 COMPLETE CBC AUTOMATED: CPT | Performed by: STUDENT IN AN ORGANIZED HEALTH CARE EDUCATION/TRAINING PROGRAM

## 2023-10-22 PROCEDURE — 83735 ASSAY OF MAGNESIUM: CPT | Performed by: STUDENT IN AN ORGANIZED HEALTH CARE EDUCATION/TRAINING PROGRAM

## 2023-10-22 PROCEDURE — 94664 DEMO&/EVAL PT USE INHALER: CPT

## 2023-10-22 PROCEDURE — 94640 AIRWAY INHALATION TREATMENT: CPT

## 2023-10-22 PROCEDURE — 99233 SBSQ HOSP IP/OBS HIGH 50: CPT | Performed by: STUDENT IN AN ORGANIZED HEALTH CARE EDUCATION/TRAINING PROGRAM

## 2023-10-22 PROCEDURE — 94760 N-INVAS EAR/PLS OXIMETRY 1: CPT

## 2023-10-22 PROCEDURE — 94669 MECHANICAL CHEST WALL OSCILL: CPT

## 2023-10-22 RX ORDER — SODIUM CHLORIDE FOR INHALATION 3 %
4 VIAL, NEBULIZER (ML) INHALATION
Status: DISCONTINUED | OUTPATIENT
Start: 2023-10-22 | End: 2023-10-24

## 2023-10-22 RX ORDER — FUROSEMIDE 10 MG/ML
40 INJECTION INTRAMUSCULAR; INTRAVENOUS
Status: DISCONTINUED | OUTPATIENT
Start: 2023-10-22 | End: 2023-10-22

## 2023-10-22 RX ORDER — PREDNISONE 20 MG/1
40 TABLET ORAL DAILY
Status: DISCONTINUED | OUTPATIENT
Start: 2023-10-23 | End: 2023-10-25

## 2023-10-22 RX ORDER — FUROSEMIDE 40 MG/1
40 TABLET ORAL DAILY
Status: DISCONTINUED | OUTPATIENT
Start: 2023-10-23 | End: 2023-10-26

## 2023-10-22 RX ORDER — DIPHENHYDRAMINE HYDROCHLORIDE 50 MG/ML
50 INJECTION INTRAMUSCULAR; INTRAVENOUS
Status: DISCONTINUED | OUTPATIENT
Start: 2023-10-22 | End: 2023-10-22

## 2023-10-22 RX ORDER — FUROSEMIDE 20 MG/1
20 TABLET ORAL DAILY
Status: DISCONTINUED | OUTPATIENT
Start: 2023-10-23 | End: 2023-10-22

## 2023-10-22 RX ORDER — FLUTICASONE PROPIONATE 50 MCG
2 SPRAY, SUSPENSION (ML) NASAL 2 TIMES DAILY
Status: DISCONTINUED | OUTPATIENT
Start: 2023-10-22 | End: 2023-10-27 | Stop reason: HOSPADM

## 2023-10-22 RX ORDER — SODIUM CHLORIDE/ALOE VERA
1 GEL (GRAM) NASAL
Status: DISCONTINUED | OUTPATIENT
Start: 2023-10-22 | End: 2023-10-27 | Stop reason: HOSPADM

## 2023-10-22 RX ORDER — DIPHENHYDRAMINE HCL 25 MG
50 TABLET ORAL
Status: DISCONTINUED | OUTPATIENT
Start: 2023-10-22 | End: 2023-10-27 | Stop reason: HOSPADM

## 2023-10-22 RX ADMIN — FORMOTEROL FUMARATE DIHYDRATE 20 MCG: 20 SOLUTION RESPIRATORY (INHALATION) at 09:25

## 2023-10-22 RX ADMIN — GUAIFENESIN 1200 MG: 600 TABLET ORAL at 09:14

## 2023-10-22 RX ADMIN — HEPARIN SODIUM 5000 UNITS: 5000 INJECTION INTRAVENOUS; SUBCUTANEOUS at 05:40

## 2023-10-22 RX ADMIN — GUAIFENESIN 1200 MG: 600 TABLET ORAL at 18:01

## 2023-10-22 RX ADMIN — FAMOTIDINE 20 MG: 20 TABLET, FILM COATED ORAL at 21:35

## 2023-10-22 RX ADMIN — SODIUM CHLORIDE SOLN NEBU 3% 4 ML: 3 NEBU SOLN at 19:56

## 2023-10-22 RX ADMIN — GABAPENTIN 300 MG: 300 CAPSULE ORAL at 21:34

## 2023-10-22 RX ADMIN — Medication 1 APPLICATION: at 18:01

## 2023-10-22 RX ADMIN — LEVALBUTEROL HYDROCHLORIDE 1.25 MG: 1.25 SOLUTION RESPIRATORY (INHALATION) at 08:37

## 2023-10-22 RX ADMIN — Medication 1 APPLICATION: at 23:18

## 2023-10-22 RX ADMIN — BENZONATATE 100 MG: 100 CAPSULE ORAL at 18:01

## 2023-10-22 RX ADMIN — METHYLPREDNISOLONE SODIUM SUCCINATE 40 MG: 40 INJECTION, POWDER, FOR SOLUTION INTRAMUSCULAR; INTRAVENOUS at 09:14

## 2023-10-22 RX ADMIN — METHYLPREDNISOLONE SODIUM SUCCINATE 40 MG: 40 INJECTION, POWDER, FOR SOLUTION INTRAMUSCULAR; INTRAVENOUS at 21:28

## 2023-10-22 RX ADMIN — Medication 1 APPLICATION: at 21:24

## 2023-10-22 RX ADMIN — BUDESONIDE 0.5 MG: 0.5 INHALANT ORAL at 19:57

## 2023-10-22 RX ADMIN — DIPHENHYDRAMINE HYDROCHLORIDE 50 MG: 25 TABLET ORAL at 23:18

## 2023-10-22 RX ADMIN — FORMOTEROL FUMARATE DIHYDRATE 20 MCG: 20 SOLUTION RESPIRATORY (INHALATION) at 20:32

## 2023-10-22 RX ADMIN — PANTOPRAZOLE SODIUM 20 MG: 20 TABLET, DELAYED RELEASE ORAL at 05:40

## 2023-10-22 RX ADMIN — FLUTICASONE PROPIONATE 2 SPRAY: 50 SPRAY, METERED NASAL at 11:17

## 2023-10-22 RX ADMIN — Medication 12.5 MG: at 09:14

## 2023-10-22 RX ADMIN — IPRATROPIUM BROMIDE 0.5 MG: 0.5 SOLUTION RESPIRATORY (INHALATION) at 08:37

## 2023-10-22 RX ADMIN — LEVALBUTEROL HYDROCHLORIDE 1.25 MG: 1.25 SOLUTION RESPIRATORY (INHALATION) at 19:56

## 2023-10-22 RX ADMIN — BUDESONIDE 0.5 MG: 0.5 INHALANT ORAL at 08:37

## 2023-10-22 RX ADMIN — Medication 12.5 MG: at 21:35

## 2023-10-22 RX ADMIN — BENZONATATE 100 MG: 100 CAPSULE ORAL at 09:14

## 2023-10-22 RX ADMIN — FUROSEMIDE 40 MG: 10 INJECTION, SOLUTION INTRAMUSCULAR; INTRAVENOUS at 09:14

## 2023-10-22 RX ADMIN — HEPARIN SODIUM 5000 UNITS: 5000 INJECTION INTRAVENOUS; SUBCUTANEOUS at 13:12

## 2023-10-22 RX ADMIN — LEVALBUTEROL HYDROCHLORIDE 1.25 MG: 1.25 SOLUTION RESPIRATORY (INHALATION) at 13:54

## 2023-10-22 RX ADMIN — ASPIRIN 81 MG CHEWABLE TABLET 81 MG: 81 TABLET CHEWABLE at 21:35

## 2023-10-22 RX ADMIN — HEPARIN SODIUM 5000 UNITS: 5000 INJECTION INTRAVENOUS; SUBCUTANEOUS at 21:40

## 2023-10-22 RX ADMIN — ATORVASTATIN CALCIUM 80 MG: 80 TABLET, FILM COATED ORAL at 18:01

## 2023-10-22 RX ADMIN — FLUTICASONE PROPIONATE 2 SPRAY: 50 SPRAY, METERED NASAL at 18:01

## 2023-10-22 RX ADMIN — ACETYLCYSTEINE 600 MG: 200 SOLUTION ORAL; RESPIRATORY (INHALATION) at 08:37

## 2023-10-22 NOTE — CONSULTS
Consultation - Cardiology   Jany Payne 77 y.o. male MRN: [id number]  Unit/Bed#: -01 Encounter: [id number]      Assessment/Plan: Jany Payne is a 77y.o. year old male coronary artery disease prior MI in 1995 with PCI to the RCA with subsequent stenting in 1999 2001, stenting to the LAD in 2003, nonobstructive disease on cardiac catheter in 2021, hypertension, hyperlipidemia, heart failure with reduced ejection fraction last transthoracic echo with EF of 35%, high burden PVCs with Holter of 9.2%, 6 severe COPD chronically on 3 L nasal cannula that presented with worsening shortness of breath at the patient suspects is related to his COPD with him having worsening wheezing. Heart failure with reduced ejection fraction with a EF of 35%: This presentation is most likely a rate related to emphysema and currently appears euvolemic and compensated. Etiology: Ischemic cardiomyopathy in the setting of RCA and LAD stenting  NYHA Functional Class ACC Stage 3C:   Preload: Peers euvolemic at this time and I would stop IV diuretics and transition him to his home diuretic. BMP value not accurate considering he is on Entresto. Clinically he appears euvolemic and back after some days of IV diuretics. Most of his presentation is related to his emphysema and would just restart his Lasix 40 mg daily. Afterload: Continue Entresto low-dose 2426 twice a day  NHB: Entresto as above, Toprol 25 XL  SGLT2: Continue Jardiance 10 mg daily   Antiarrhythmics:  ASA/Statin: Crestor 40 mg daily, aspirin 81 mg daily  Devices: could me for primary prevention ICD consideringet criteria ejection fraction and high burden of PVCs and need for higher beta-blocker. However, this might not be aligned with his goals. PHTN: Most likely combination of left-sided heart disease and lung disease.   Heart Failure Diet ( <2g Sodium & 1.5 L fluids daily)  Daily weights  Accurate I/O:  Telemetry  K>4 & Mg >2    Coronary artery disease with RCA and LAD stenting, nonobstructive coronary disease on cath in 2021. CAD appears to be stable  -Statin, aspirin, beta-blocker as above        Recommendations:  -Suspect this is most likely related to advanced COPD and currently now appears euvolemic from heart failure perspective. We will transition back to his oral 40 mg of Lasix daily. Continue all the same cardiac medications.  -Recommend pulmonary evaluation  -Would consider outpatient Holter monitor for PVC burden    History of Present Illness   Physician Requesting Consult: Bill Guerrero DO  Reason for Consult / Principal Problem: SOB  HPI: Paula Bermudez is a 77y.o. year old male coronary artery disease prior MI in 1995 with PCI to the RCA with subsequent stenting in 1999 2001, stenting to the LAD in 2003, nonobstructive disease on cardiac catheter in 2021, hypertension, hyperlipidemia, heart failure with reduced ejection fraction last transthoracic echo with EF of 35%, high burden PVCs with Holter of 9.2%, 6 severe COPD chronically on 3 L nasal cannula that presented with worsening shortness of breath at the patient suspects is related to his COPD with him having worsening wheezing. He states over the past couple days prior to his admission to the hospital he had worsening shortness of breath with none of his breathing treatments at home working for him. Considering no improvement in his symptoms he came to the hospital.  He was started on medications for both his COPD and heart failure exacerbation. He states his breathing has not improved significantly. The patient recently saw his outpatient cardiologist and outpatient heart failure doctors. At that time per their notes patient was euvolemic and compensated just with some soft blood pressures from starting Entresto. He need to have some of his medications titrated because of the soft blood pressures.   Patient states at home he has not had any dizziness or other presyncopal symptoms associated with the soft blood pressures. Inpatient consult to Cardiology  Consult performed by: Trish Monte MD  Consult ordered by: Bina Cheek DO        Review of Systems:  Review of Systems   Constitutional: Negative. HENT: Negative. Eyes: Negative. Respiratory:  Positive for shortness of breath. Cardiovascular: Negative. Gastrointestinal: Negative. Musculoskeletal: Negative. Neurological: Negative. Hematological: Negative. Psychiatric/Behavioral: Negative.          14 systems reviewed and negative with the exception of the above and the following    Historical Information   Past Medical History:   Diagnosis Date    Abnormal ECG 2021 OR 2022    Alcoholism ([address]) 1984    sober 38 years    Arthritis 2008    beginning    Bilateral carotid artery stenosis     Callus     Cancer ([address])     skin    Chronic diastolic heart failure (HCC)     Chronic ischemic heart disease     Chronic kidney disease     stage 3    Colon polyp     COPD (chronic obstructive pulmonary disease) (HCC)     Coronary artery disease     hx stents, MI, PCI    COVID 11/2021    CPAP (continuous positive airway pressure) dependence     Disease of thyroid gland 2017    nodules    Emphysema of lung ([address]) 1995    started    Hearing loss     History of transfusion 1995    Hyperlipidemia     Hypertension     Intracranial aneurysm 04/25/2023    Lung nodule 2023    x rays    MI (myocardial infarction) ([address]) 1995    Myocardial infarction ([address]) 1995    Pneumonia     Pneumothorax 02/20/2023    collapsed lung    Prostate cancer ([address])     RSV (respiratory syncytial virus infection) 10/2022    S/P carotid endarterectomy     Shortness of breath     O2 2 l/nc PRN    Sleep apnea     Sleep apnea, obstructive     Stented coronary artery      Past Surgical History:   Procedure Laterality Date    APPENDECTOMY      CARDIAC CATHETERIZATION  03/18/2021    left main with no significant disease, proximal LAD with 10% stenosis at the site of prior stent, left circumflex artery with minimal luminal irregularities mid RCA with 50% stenosis at site of prior stent and PL segment with distal disease supplied by collaterals from the distal circumflex with no significant CAD requiring revascularization at that time.     CATARACT EXTRACTION, BILATERAL Bilateral     COLONOSCOPY      CORONARY ANGIOPLASTY WITH STENT PLACEMENT      RCA    CORONARY ANGIOPLASTY WITH STENT PLACEMENT      RCA    CORONARY ANGIOPLASTY WITH STENT PLACEMENT      LAD    EYE SURGERY      UT BX PROSTATE STRTCTC SATURATION SAMPLING IMG GID N/A 2022    Procedure: TRANSPERINEAL MRI FUSION  BIOPSY PROSTATE;  Surgeon: Baldomero Bah MD;  Location: [facility];  Service: Urology    UT NEUROPLASTY &/TRANSPOS MEDIAN NRV Lorenda Meiers Left 2022    Procedure: RELEASE CARPAL TUNNEL;  Surgeon: Lynford Holstein, MD;  Location: [facility];  Service: Orthopedics    UT NEUROPLASTY &/TRANSPOSITION ULNAR NERVE ELBOW Left 2022    Procedure: Bhavik Rack;  Surgeon: Lynford Holstein, MD;  Location: [facility];  Service: Orthopedics    UT NEUROPLASTY &/TRANSPOSITION ULNAR NERVE WRIST Left 2022    Procedure: Claude Benne;  Surgeon: Lynford Holstein, MD;  Location: [facility];  Service: Orthopedics    SKIN CANCER EXCISION  2012    chin-per pt, basal cell  also right ankle    TONSILLECTOMY  no     Social History     Substance and Sexual Activity   Alcohol Use Not Currently     Social History     Substance and Sexual Activity   Drug Use Never     Social History     Tobacco Use   Smoking Status Former    Packs/day: 2.00    Years: 35.00    Total pack years: 70.00    Types: Cigarettes    Start date: 1960    Quit date: 1995    Years since quittin.3   Smokeless Tobacco Never   Tobacco Comments    stopped smoking the day i had a heart attack     Family History: non-contributory    Meds/Allergies   all current active meds have been reviewed  Allergies Allergen Reactions    Lisinopril Swelling and Cough    Tetanus Antitoxin Anaphylaxis    Tetanus Toxoid Anaphylaxis and Swelling       Objective   Vitals: Blood pressure 97/59, pulse 99, temperature 97.5 °F (36.4 °C), temperature source Oral, resp. rate 18, height 5' 8" (1.727 m), weight 81.4 kg (179 lb 9 oz), SpO2 98 %. , Body mass index is 27.3 kg/m².,   Orthostatic Blood Pressures      Flowsheet Row Most Recent Value   Blood Pressure 97/59 filed at 10/22/2023 4409   Patient Position - Orthostatic VS Lying filed at 10/22/2023 9729              Intake/Output Summary (Last 24 hours) at 10/22/2023 1015  Last data filed at 10/22/2023 4107  Gross per 24 hour   Intake 360 ml   Output 2250 ml   Net -1890 ml       Invasive Devices       Peripheral Intravenous Line  Duration             Peripheral IV 10/18/23 Right Antecubital 4 days                        Physical Exam:  Physical Exam    Gen: No acute distress  HEENT: anicteric, mucous membranes moist  Neck: supple, no jugular venous distention, or carotid bruit  Heart: JVP 7 with no AJR, no heave, regular, normal s1 and s2, no murmur/rub or gallop  Lungs :mild wheezing and poor airflow bilaterally   Abdomen: soft nontender, normoactive bowel sounds, no organomegaly  Ext: warm and perfused, normal femoral pulses, no edema, clubbing  Skin: warm, no rashes  Neuro: AAO x 3, no focal findings  Psychiatric: normal affect  Musculoskeletal: no obvious joint deformities.     Lab Results:     Lab Results   Component Value Date    TROPONINI <0.03 02/21/2021    TROPONINI <0.03 02/21/2021    TROPONINI <0.03 02/21/2021       Lab Results   Component Value Date    CALCIUM 8.7 10/22/2023    K 4.1 10/22/2023    CO2 33 (H) 10/22/2023     10/22/2023    BUN 29 (H) 10/22/2023    CREATININE 1.15 10/22/2023       Lab Results   Component Value Date    WBC 11.36 (H) 10/22/2023    HGB 13.5 10/22/2023    HCT 43.9 10/22/2023     (H) 10/22/2023     10/22/2023       No results found for: "CHOL"  Lab Results   Component Value Date    HDL 73 09/02/2023    HDL 69 01/27/2023    HDL 53 04/18/2022     Lab Results   Component Value Date    LDLCALC 52 09/02/2023    LDLCALC 45 01/27/2023    LDLCALC 45 04/18/2022     Lab Results   Component Value Date    TRIG 171 (H) 09/02/2023    TRIG 90 01/27/2023    TRIG 62 04/18/2022       Lab Results   Component Value Date    ALT 20 10/18/2023    AST 22 10/18/2023    ALKPHOS 53 10/18/2023               Imaging: I have personally reviewed pertinent reports.       EKG:

## 2023-10-22 NOTE — ASSESSMENT & PLAN NOTE
Wt Readings from Last 3 Encounters:   10/22/23 81.4 kg (179 lb 9 oz)   10/02/23 82.9 kg (182 lb 12.8 oz)   09/29/23 82.1 kg (181 lb)   Clinically improving with IV diuresis.   Plan to transition to oral Lasix today  Cardiology input appreciated  Entresto discontinued due to low blood pressures  Continue current cardiac medications  Monitor PENG's Daily weight

## 2023-10-22 NOTE — RESPIRATORY THERAPY NOTE
10/21/23 5666   Respiratory Assessment   Resp Comments pt placed himself on his own preset machine for  HS. RT did turn 4L on, and filled h2o chamber.  will continue to monitor pt   O2 Device cpap   Non-Invasive Information   Non-Invasive Ventilation Mode CPAP   SpO2 91 %   $ Pulse Oximetry Spot Check Charge Completed   Non-Invasive Settings   FiO2 (%)   (with 4L)   PEEP/CPAP (cm H2O)   (preset)

## 2023-10-22 NOTE — PLAN OF CARE
Problem: INFECTION - ADULT  Goal: Absence or prevention of progression during hospitalization  Description: INTERVENTIONS:  - Assess and monitor for signs and symptoms of infection  - Monitor lab/diagnostic results  - Monitor all insertion sites, i.e. indwelling lines, tubes, and drains  - Monitor endotracheal if appropriate and nasal secretions for changes in amount and color  - [place] appropriate cooling/warming therapies per order  - Administer medications as ordered  - Instruct and encourage patient and family to use good hand hygiene technique  - Identify and instruct in appropriate isolation precautions for identified infection/condition  Outcome: Progressing  Goal: Absence of fever/infection during neutropenic period  Description: INTERVENTIONS:  - Monitor WBC    Outcome: Progressing

## 2023-10-22 NOTE — PLAN OF CARE
Problem: PAIN - ADULT  Goal: Verbalizes/displays adequate comfort level or baseline comfort level  Description: Interventions:  - Encourage patient to monitor pain and request assistance  - Assess pain using appropriate pain scale  - Administer analgesics based on type and severity of pain and evaluate response  - Implement non-pharmacological measures as appropriate and evaluate response  - Consider cultural and social influences on pain and pain management  - Notify physician/advanced practitioner if interventions unsuccessful or patient reports new pain  Outcome: Progressing     Problem: INFECTION - ADULT  Goal: Absence or prevention of progression during hospitalization  Description: INTERVENTIONS:  - Assess and monitor for signs and symptoms of infection  - Monitor lab/diagnostic results  - Monitor all insertion sites, i.e. indwelling lines, tubes, and drains  - Monitor endotracheal if appropriate and nasal secretions for changes in amount and color  - [place] appropriate cooling/warming therapies per order  - Administer medications as ordered  - Instruct and encourage patient and family to use good hand hygiene technique  - Identify and instruct in appropriate isolation precautions for identified infection/condition  Outcome: Progressing     Problem: Prexisting or High Potential for Compromised Skin Integrity  Goal: Skin integrity is maintained or improved  Description: INTERVENTIONS:  - Identify patients at risk for skin breakdown  - Assess and monitor skin integrity  - Assess and monitor nutrition and hydration status  - Monitor labs   - Assess for incontinence   - Turn and reposition patient  - Assist with mobility/ambulation  - Relieve pressure over bony prominences  - Avoid friction and shearing  - Provide appropriate hygiene as needed including keeping skin clean and dry  - Evaluate need for skin moisturizer/barrier cream  - Collaborate with interdisciplinary team   - Patient/family teaching  - Consider wound care consult   Outcome: Progressing

## 2023-10-22 NOTE — CONSULTS
Consultation - Pulmonary Medicine   Jany Payne 77 y.o. male MRN: [id number]  Unit/Bed#: -01 Encounter: [id number]      Physician Requesting Consult: Kd Ayala DO   Reason for Consult: COPD      Assessment:  Acute pulm insufficiency on chronic hypoxic respiratory failure most likely secondary to CHF and COPD exacerbation  Severe COPD/emphysema with acute exacerbation  Acute on chronic combined systolic and diastolic CHF  Small pleural effusions secondary to CHF  Minimal atelectasis at the bases    Plan:   Continue oxygen as needed to maintain pulse ox more than 88%  Continue IV Solu-Medrol for today then transition to prednisone 40 mg tomorrow and taper by 10 mg every 3 days to stop  Continue bronchodilators nebulizer therapy  Monitor off antibiotic  No indication for bronchoscopy, encourage out of bed and ambulation and incentive spirometry/flutter valve for pulmonary toilet  Diuresis with IV Lasix as per primary team/cardiology  Pleural effusions are too small to be tapped, follow in the future  Continue Flonase and consider saline nasal gel and spray as needed, follow with ENT as outpatient      ______________________________________________________________________    HPI:    Jany Payne is a 77 y.o. male who presents with worsening shortness of breath. Patient has COPD and chronic hypoxic respiratory failure on 3 L of oxygen at home, LAMBERTO on CPAP at night, chronic systolic CHF with LVEF 56% on diuretics and also CKD stage III. He presents with worsening shortness of breath and wheezing and cough for few days prior to admission. During this hospitalization he is started on steroids and bronchodilators and also diuretics and he reports improvement since then. Currently he still has some cough but not able to clear secretions and he was started on vest therapy. He denies chest pain, denies fever chills or night sweats, denies wheezing.   He is bothered by the oxygen and reports feeling clogged in his right nostril. He lives at home with his wife, no history of occupational exposure, he quit smoking remotely in 2 [facility]    PFT results:  2023:  Severe obstructive airflow imitation on spirometry  No significant but near significant bronchodilator response  Normal total lung capacity but increased residual volume indicating air trapping  Severely impaired diffusion capacity  Obstructive flow-volume loop    Review of Systems:  12 points Full review of systems was performed. Aside from what was mentioned in the HPI, it is otherwise negative.     Historical Information   Past Medical History:   Diagnosis Date    Abnormal ECG 2021 OR 2022    Alcoholism ([address]) 1984    sober 45 years    Arthritis 2008    beginning    Bilateral carotid artery stenosis     Callus     Cancer (HCC)     skin    Chronic diastolic heart failure (HCC)     Chronic ischemic heart disease     Chronic kidney disease     stage 3    Colon polyp     COPD (chronic obstructive pulmonary disease) (HCC)     Coronary artery disease     hx stents, MI, PCI    COVID 11/2021    CPAP (continuous positive airway pressure) dependence     Disease of thyroid gland 2017    nodules    Emphysema of lung ([address]) 1995    started    Hearing loss     History of transfusion 1995    Hyperlipidemia     Hypertension     Intracranial aneurysm 04/25/2023    Lung nodule 2023    x rays    MI (myocardial infarction) ([address]) 1995    Myocardial infarction ([address]) 1995    Pneumonia     Pneumothorax 02/20/2023    collapsed lung    Prostate cancer ([address])     RSV (respiratory syncytial virus infection) 10/2022    S/P carotid endarterectomy     Shortness of breath     O2 2 l/nc PRN    Sleep apnea     Sleep apnea, obstructive     Stented coronary artery      Past Surgical History:   Procedure Laterality Date    APPENDECTOMY      CARDIAC CATHETERIZATION  03/18/2021    left main with no significant disease, proximal LAD with 10% stenosis at the site of prior stent, left circumflex artery with minimal luminal irregularities mid RCA with 50% stenosis at site of prior stent and PL segment with distal disease supplied by collaterals from the distal circumflex with no significant CAD requiring revascularization at that time.     CATARACT EXTRACTION, BILATERAL Bilateral     COLONOSCOPY      CORONARY ANGIOPLASTY WITH STENT PLACEMENT      RCA    CORONARY ANGIOPLASTY WITH STENT PLACEMENT      RCA    CORONARY ANGIOPLASTY WITH STENT PLACEMENT      LAD    EYE SURGERY      NY BX PROSTATE STRTCTC SATURATION SAMPLING IMG GID N/A 2022    Procedure: TRANSPERINEAL MRI FUSION  BIOPSY PROSTATE;  Surgeon: Linnie Scheuermann, MD;  Location: [facility];  Service: Urology    NY NEUROPLASTY &/TRANSPOS MEDIAN NRV Lauren Sinclair Left 2022    Procedure: RELEASE CARPAL TUNNEL;  Surgeon: Leonor Ramirez MD;  Location: [facility];  Service: Orthopedics    NY NEUROPLASTY &/TRANSPOSITION ULNAR NERVE ELBOW Left 2022    Procedure: RELEASE CUBITAL TUNNEL;  Surgeon: Leonor Ramirez MD;  Location: [facility];  Service: Orthopedics    NY NEUROPLASTY &/TRANSPOSITION ULNAR NERVE WRIST Left 2022    Procedure: Cira Velez;  Surgeon: Leonor Ramirez MD;  Location: [facility];  Service: Orthopedics    SKIN CANCER EXCISION  2012    chin-per pt, basal cell  also right ankle    TONSILLECTOMY  no     Social History   Social History     Substance and Sexual Activity   Alcohol Use Not Currently     Social History     Tobacco Use   Smoking Status Former    Packs/day: 2.00    Years: 35.00    Total pack years: 70.00    Types: Cigarettes    Start date: 1960    Quit date: 1995    Years since quittin.3   Smokeless Tobacco Never   Tobacco Comments    stopped smoking the day i had a heart attack       Occupational history:  No occupational exposure    Family History:   Family History   Problem Relation Age of Onset    Heart attack Mother     Dementia Mother     Heart failure Mother           Heart disease Mother         heart attacks (2)    No Known Problems Father        Medications: The patient's active and prehospital medications were reviewed.   Current Facility-Administered Medications   Medication Dose Route Frequency Provider Last Rate    acetaminophen  650 mg Oral Q6H PRN Pandi Todhe, DO      albuterol  2 puff Inhalation Q4H PRN Pandi Todhe, DO      albuterol  2.5 mg Nebulization Q6H PRN Pandi Todhe, DO      aspirin  81 mg Oral HS Pandi Todhe, DO      atorvastatin  80 mg Oral Daily With Dominguez Oil, DO      benzonatate  100 mg Oral TID Pandi Todhe, DO      budesonide  0.5 mg Nebulization Q12H Pandi Todhe, DO      cyanocobalamin  1,000 mcg Intramuscular Q30 Days Pandi Todhe, DO      famotidine  20 mg Oral HS Pandi Todhe, DO      fluticasone  2 spray Each Nare BID Pandi Todhe, DO      formoterol  20 mcg Nebulization Q12H Briseydai Toterese, DO      [START ON 10/23/2023] furosemide  20 mg Oral Daily Pandi Todhe, DO      gabapentin  300 mg Oral HS Briseydai Todhe, DO      guaiFENesin  1,200 mg Oral BID Briseydai Todhe, DO      heparin (porcine)  5,000 Units Subcutaneous Q8H [address] [facility] Tocynthia, DO      levalbuterol  1.25 mg Nebulization TID Briseydai Todhe, DO      methylPREDNISolone sodium succinate  40 mg Intravenous Q12H [address] [facility] Tocynthia, DO      metoprolol tartrate  12.5 mg Oral Q12H [address] [facility] Tocynthia, DO      pantoprazole  20 mg Oral Early Morning Pandi Todhe, DO      sacubitril-valsartan  1 tablet Oral BID Pandi Todhe, DO      sodium chloride  4 mL Nebulization BID Pandi Todhe, DO      traZODone  50 mg Oral HS PRN Briseydai Todhe, DO           PhysicalExamination:  Vitals:   Vitals:    10/22/23 0717 10/22/23 0914 10/22/23 0952 10/22/23 1100   BP: 109/67 97/59     BP Location: Right arm      Pulse: 90 99     Resp: 18      Temp: 97.5 °F (36.4 °C)      TempSrc: Oral      SpO2: 98%      Weight:   81.1 kg (178 lb 10.9 oz) 81.1 kg (178 lb 10.9 oz)   Height:         Temp  Min: 96.3 °F (35.7 °C)  Max: 98.6 °F (37 °C)  IBW (Ideal Body Weight): 68.4 kg    SpO2: 98 %,   SpO2 Activity: At Rest,   O2 Device: Nasal cannula    General: alert, not in acute distress  HEENT: PERRL, no icteric sclera or cyanosis, no thrush  Neck: Supple, no lymphadenopathy or thyromegaly, no JVD  Lungs: Equal breath sounds with few crackles at bases bilaterally and very minimal end expiratory wheezes  Heart: S1S2 regular, no murmurs or gallops  Abdomen: soft, nontender, bowel sounds present  Extremities: Trace edema, no clubbing or cyanosis  Neuro: Alert and oriented x 3, no focal neurodeficits   Skin: intact, no rashes    Diagnostic Data:  CBC:   Results from last 7 days   Lab Units 10/22/23  0542 10/21/23  0445 10/20/23  0641   WBC Thousand/uL 11.36* 14.41* 14.27*   HEMOGLOBIN g/dL 13.5 13.0 13.0   HEMATOCRIT % 43.9 42.3 42.7   PLATELETS Thousands/uL 207 213 204       CMP:   Results from last 7 days   Lab Units 10/22/23  0542 10/21/23  0445 10/20/23  0641 10/19/23  0513 10/18/23  1210   POTASSIUM mmol/L 4.1 4.5 4.5   < > 3.1*   CHLORIDE mmol/L 100 101 103   < > 105   CO2 mmol/L 33* 31 30   < > 31   BUN mg/dL 29* 27* 22   < > 13   CREATININE mg/dL 1.15 1.19 1.05   < > 1.02   CALCIUM mg/dL 8.7 8.7 8.6   < > 8.6   ALK PHOS U/L  --   --   --   --  53   ALT U/L  --   --   --   --  20   AST U/L  --   --   --   --  22    < > = values in this interval not displayed. PT/INR:   No results found for: "PT", "INR",     Microbiology:  Results from last 7 days   Lab Units 10/18/23  1751 10/18/23  1714 10/18/23  1324 10/18/23  1323   BLOOD CULTURE   --   --  No Growth at 72 hrs. No Growth at 72 hrs. SPUTUM CULTURE  4+ Growth of  Commensal respiratory robi only; No significant growth of Staph aureus/MRSA or Pseudomonas aeruginosa.   --   --   --    GRAM STAIN RESULT  1+ Epithelial cells per low power field*  1+ Polys*  1+ Gram positive cocci in clusters*  --   --   --    LEGIONELLA URINARY ANTIGEN   --  Negative  --   --         ABG: No results found for: "PHART", "OAW3CJC", "PO2ART", "NKL3YQG", "S9QISCWT", "BEART", "SOURCE"    Imaging:  Chest CT scan reviewed on PACS: Severe panlobular emphysematous changes specially in the upper lobes with diffuse septal thickening and interstitial edema, small bilateral pleural effusions with bibasilar atelectasis right more than left. Cardiac lab/EKG/telemetry/ECHO:       Results from last 7 days   Lab Units 10/21/23  1639 10/18/23  1210   BNP pg/mL 1,892* 774*       Echocardiogram: LVEF 77%, grade 1 diastolic dysfunction, mildly dilated RV with mildly reduced systolic function, mildly dilated LA, estimated peak PA pressure 46     Code Status: Level 2 - DNAR: but accepts endotracheal intubation    Reymundo Prakash MD    Portions of the record may have been created with voice recognition software. Occasional wrong word or "sound a like" substitutions may have occurred due to the inherent limitations of voice recognition software. Read the chart carefully and recognize, using context, where substitutions have occurred.

## 2023-10-22 NOTE — PROGRESS NOTES
[id number]  Progress Note  Name: Alberto Villegas  MRN: [id number]  Unit/Bed#: -01 I Date of Admission: 10/18/2023   Date of Service: 10/22/2023 I Hospital Day: 3    Assessment/Plan   * COPD with acute exacerbation ([address])  Assessment & Plan  Presents with increased work of breathing wheezing and coughing  Suspect patient is in COPD exacerbation secondary to pneumonia  Received Solu-Medrol 125 mg in the ER and DuoNeb treatment  Finished Solu-Medrol 40 mg every 8 hours for 2 days. Transition to solumedrol 40 bid day 3  Finished 3 days of Azithromycin  Ipratropium and Xopenex nebulizers 3 times daily  Formoterol and budesonide nebulizers twice daily  Admits to having shortness of breath with exertion, will hold off on discharge for another 24 hours    On 10/21/2023 patient continued to have dyspnea on exertion even after continued nebulizer treatments and IV steroids thus decided to obtain CT chest showing  Septal thickening due to interstitial edema with small right and trace left effusion. Debris in the right lower lobe bronchi with atelectasis of the posterior basal right lower lobe. Minimal atelectasis of the posterior basal left lower lobe. Severe upper lobe predominant panlobular emphysema. Pulmonary artery enlargement which can be seen with pulmonary hypertension. Consulted pulm ?  May need bronchoscopy    Acute systolic congestive heart failure (HCC)  Assessment & Plan  Wt Readings from Last 3 Encounters:   10/22/23 81.4 kg (179 lb 9 oz)   10/02/23 82.9 kg (182 lb 12.8 oz)   09/29/23 82.1 kg (181 lb)       -Transthoracic echocardiogram 8/18/2023 showing left ventricular systolic function moderately reduced estimated LVEF 35% with grade 1 diastolic dysfunction, mildly dilated and mildly reduced right ventricular systolic function with mild tricuspid regurgitation and aortic sclerosis     Pt hospitalized in may for dehydration/fredy where his lasix dose was reduced to 20 mg daily by nephrology  Patient's cardiologist discontinued aldactone 9/2023 due to borderline hypotension    On 10/21/2023 patient continued to have dyspnea on exertion even after continued nebulizer treatments and IV steroids thus decided to obtain CT chest showing  Septal thickening due to interstitial edema with small right and trace left effusion. Debris in the right lower lobe bronchi with atelectasis of the posterior basal right lower lobe. Minimal atelectasis of the posterior basal left lower lobe. Severe upper lobe predominant panlobular emphysema. Pulmonary artery enlargement which can be seen with pulmonary hypertension. Status post Lasix 40 IV daily for 3 days  Start Lasix 40 IV twice daily  Cardiology consulted   May have to tolerate higher baseline creatinine to achieve euvolemia    12/20 D/c toprol xl 25 mg daily, transition to lopressor 12.5 mg bid. In setting of hypotension. May need to stop entresto on discharge       Pneumonia of right lower lobe due to infectious organism  Assessment & Plan  67-year-old male patient with COPD on 3 L/min of oxygen presents with increased productive cough, shortness of breath, wheezing and increased O2 requirements up to 4L NC    CXR in ER showing New bilateral lower lung pneumonia. Received ceftriaxone and and azithromycin on admission  Procal negative x 2, no leukocytosis.  Stopped ceftriaxone after 2 doses    F/U blood cultures and sputum cultures  Supportive care      Chronic kidney disease, stage 3a [facility])  Assessment & Plan  Lab Results   Component Value Date    EGFR 57 10/21/2023    EGFR 67 10/20/2023    EGFR 73 10/19/2023    CREATININE 1.19 10/21/2023    CREATININE 1.05 10/20/2023    CREATININE 0.98 10/19/2023     Baseline Cr 1-1.2  At baseline    Chronic respiratory failure with hypoxia [facility])  Assessment & Plan  Chronic respiratory failure with hypoxia due to chronic COPD a/e/b need for continuous O2, normally at 3 LPM, on 4 LPM when admitted  Back to baseline O2 requirements    LAMBERTO on CPAP  Assessment & Plan  Continue cpap qhs    Hypertension  Assessment & Plan  Changed toprol xl 25 mg daily to lopressor 12.5 mg bid     Coronary artery disease involving native coronary artery of native heart without angina pectoris  Assessment & Plan  Continue home aspirin 81 mg daily, lopressor 12.5 mg twice daily, and resume home Crestor 40 mg daily             Chronic respiratory failure with hypoxia due to chronic COPD a/e/b need for continuous O2, normally at 3 LPM, currently at 4 LPM.     VTE Pharmacologic Prophylaxis: VTE Score: 3 Moderate Risk (Score 3-4) - Pharmacological DVT Prophylaxis Ordered: heparin. Patient Centered Rounds: I performed bedside rounds with nursing staff today. Discussions with Specialists or Other Care Team Provider: case management    Education and Discussions with Family / Patient: Attempted to update  (daughter) via phone. Left voicemail. Total Time Spent on Date of Encounter in care of patient: 35 mins. This time was spent on one or more of the following: performing physical exam; counseling and coordination of care; obtaining or reviewing history; documenting in the medical record; reviewing/ordering tests, medications or procedures; communicating with other healthcare professionals and discussing with patient's family/caregivers. Current Length of Stay: 3 day(s)  Current Patient Status: Inpatient   Certification Statement: The patient will continue to require additional inpatient hospital stay due to copd exacerbation and chf  Discharge Plan: Anticipate discharge in 48-72 hrs to discharge location to be determined pending rehab evaluations. Code Status: Level 2 - DNAR: but accepts endotracheal intubation    Subjective:   Patient seen examined at bedside.   States he still has shortness of breath with exertion but improving slowly    Objective:     Vitals:   Temp (24hrs), Av.8 °F (36.6 °C), Min:97.4 °F (36.3 °C), Max:98.6 °F (37 °C)    Temp:  [97.4 °F (36.3 °C)-98.6 °F (37 °C)] 97.5 °F (36.4 °C)  HR:  [] 99  Resp:  [18-19] 18  BP: ()/(58-67) 97/59  SpO2:  [91 %-99 %] 98 %  Body mass index is 27.3 kg/m². Input and Output Summary (last 24 hours): Intake/Output Summary (Last 24 hours) at 10/22/2023 0946  Last data filed at 10/22/2023 4154  Gross per 24 hour   Intake 360 ml   Output 1850 ml   Net -1490 ml         Physical Exam:   Physical Exam  Vitals and nursing note reviewed. Constitutional:       General: He is not in acute distress. Appearance: He is well-developed. HENT:      Head: Normocephalic and atraumatic. Eyes:      Conjunctiva/sclera: Conjunctivae normal.   Cardiovascular:      Rate and Rhythm: Normal rate and regular rhythm. Heart sounds: No murmur heard. Pulmonary:      Effort: Pulmonary effort is normal. No respiratory distress. Breath sounds: Wheezing present. Abdominal:      Palpations: Abdomen is soft. Tenderness: There is no abdominal tenderness. Musculoskeletal:         General: No swelling. Cervical back: Neck supple. Skin:     General: Skin is warm and dry. Capillary Refill: Capillary refill takes less than 2 seconds. Neurological:      Mental Status: He is alert. Mental status is at baseline. Psychiatric:         Mood and Affect: Mood normal.          Additional Data:     Labs:  Results from last 7 days   Lab Units 10/22/23  0542 10/19/23  0601 10/18/23  1210   WBC Thousand/uL 11.36*   < > 6.77   HEMOGLOBIN g/dL 13.5   < > 13.6   HEMATOCRIT % 43.9   < > 44.0   PLATELETS Thousands/uL 207   < > 140*   NEUTROS PCT %  --   --  75   LYMPHS PCT %  --   --  13*   MONOS PCT %  --   --  8   EOS PCT %  --   --  4    < > = values in this interval not displayed.        Results from last 7 days   Lab Units 10/22/23  0542 10/19/23  0513 10/18/23  1210   SODIUM mmol/L 141   < > 143   POTASSIUM mmol/L 4.1   < > 3.1*   CHLORIDE mmol/L 100   < > 105   CO2 mmol/L 33*   < > 31   BUN mg/dL 29*   < > 13   CREATININE mg/dL 1.15   < > 1.02   ANION GAP mmol/L 8   < > 7   CALCIUM mg/dL 8.7   < > 8.6   ALBUMIN g/dL 3.9   < > 3.6   TOTAL BILIRUBIN mg/dL  --   --  0.66   ALK PHOS U/L  --   --  53   ALT U/L  --   --  20   AST U/L  --   --  22   GLUCOSE RANDOM mg/dL 149*   < > 164*    < > = values in this interval not displayed. Results from last 7 days   Lab Units 10/20/23  0641 10/19/23  0513 10/18/23  1324 10/18/23  1210   LACTIC ACID mmol/L  --   --  1.7  --    PROCALCITONIN ng/ml 0.09 0.09  --  0.10         Lines/Drains:  Invasive Devices       Peripheral Intravenous Line  Duration             Peripheral IV 10/18/23 Right Antecubital 4 days                          Imaging: Reviewed radiology reports from this admission including: chest xray    Recent Cultures (last 7 days):   Results from last 7 days   Lab Units 10/18/23  1751 10/18/23  1714 10/18/23  1324 10/18/23  1323   BLOOD CULTURE   --   --  No Growth at 72 hrs. No Growth at 72 hrs. SPUTUM CULTURE  4+ Growth of  Commensal respiratory robi only; No significant growth of Staph aureus/MRSA or Pseudomonas aeruginosa.   --   --   --    GRAM STAIN RESULT  1+ Epithelial cells per low power field*  1+ Polys*  1+ Gram positive cocci in clusters*  --   --   --    LEGIONELLA URINARY ANTIGEN   --  Negative  --   --          Last 24 Hours Medication List:   Current Facility-Administered Medications   Medication Dose Route Frequency Provider Last Rate    acetaminophen  650 mg Oral Q6H PRN Pandi Todhe, DO      acetylcysteine  3 mL Nebulization TID Pandi Todhe, DO      albuterol  2 puff Inhalation Q4H PRN Pandi Todhe, DO      albuterol  2.5 mg Nebulization Q6H PRN Pandi Todhe, DO      aspirin  81 mg Oral HS Pandi Todhe, DO      atorvastatin  80 mg Oral Daily With Dominguez Oil, DO      benzonatate  100 mg Oral TID Pandi Todhe, DO      budesonide  0.5 mg Nebulization Q12H Pandi Todhe, DO      cyanocobalamin 1,000 mcg Intramuscular Q30 Days Tesha Avila, DO      famotidine  20 mg Oral HS Tesha Avila, DO      formoterol  20 mcg Nebulization Q12H Tesha Avila DO      [START ON 10/23/2023] furosemide  20 mg Oral Daily Tesha Avila, DO      gabapentin  300 mg Oral HS Tesha Avila, DO      guaiFENesin  1,200 mg Oral BID Tesha Avila, DO      heparin (porcine)  5,000 Units Subcutaneous Q8H [address] Tesha Avila, DO      ipratropium  0.5 mg Nebulization TID Tesha Toterese, DO      levalbuterol  1.25 mg Nebulization TID Tesha Avila, DO      methylPREDNISolone sodium succinate  40 mg Intravenous Q12H [address] Tesha Avila, DO      metoprolol tartrate  12.5 mg Oral Q12H [address] Tesha Avila, DO      pantoprazole  20 mg Oral Early Morning Tesha Avila, DO      sacubitril-valsartan  1 tablet Oral BID Tesha Avila, DO      traZODone  50 mg Oral HS PRN Tesha Avila DO          Today, Patient Was Seen By: Jeb Gomez DO    **Please Note: This note may have been constructed using a voice recognition system. **

## 2023-10-23 ENCOUNTER — APPOINTMENT (OUTPATIENT)
Dept: PULMONOLOGY | Facility: HOSPITAL | Age: 78
End: 2023-10-23
Attending: INTERNAL MEDICINE
Payer: COMMERCIAL

## 2023-10-23 LAB
ALBUMIN SERPL BCP-MCNC: 3.6 G/DL (ref 3.5–5)
ANION GAP SERPL CALCULATED.3IONS-SCNC: 6 MMOL/L
BACTERIA BLD CULT: NORMAL
BACTERIA UR QL AUTO: NORMAL /HPF
BILIRUB UR QL STRIP: NEGATIVE
BUN SERPL-MCNC: 32 MG/DL (ref 5–25)
CALCIUM SERPL-MCNC: 8.4 MG/DL (ref 8.4–10.2)
CHLORIDE SERPL-SCNC: 101 MMOL/L (ref 96–108)
CLARITY UR: CLEAR
CO2 SERPL-SCNC: 34 MMOL/L (ref 21–32)
COLOR UR: ABNORMAL
CREAT SERPL-MCNC: 1.16 MG/DL (ref 0.6–1.3)
ERYTHROCYTE [DISTWIDTH] IN BLOOD BY AUTOMATED COUNT: 16 % (ref 11.6–15.1)
GFR SERPL CREATININE-BSD FRML MDRD: 59 ML/MIN/1.73SQ M
GLUCOSE SERPL-MCNC: 157 MG/DL (ref 65–140)
GLUCOSE UR STRIP-MCNC: NEGATIVE MG/DL
HCT VFR BLD AUTO: 42.1 % (ref 36.5–49.3)
HGB BLD-MCNC: 12.9 G/DL (ref 12–17)
HGB UR QL STRIP.AUTO: ABNORMAL
KETONES UR STRIP-MCNC: NEGATIVE MG/DL
LEUKOCYTE ESTERASE UR QL STRIP: NEGATIVE
MAGNESIUM SERPL-MCNC: 2.3 MG/DL (ref 1.9–2.7)
MCH RBC QN AUTO: 30.1 PG (ref 26.8–34.3)
MCHC RBC AUTO-ENTMCNC: 30.6 G/DL (ref 31.4–37.4)
MCV RBC AUTO: 98 FL (ref 82–98)
NITRITE UR QL STRIP: NEGATIVE
NON-SQ EPI CELLS URNS QL MICRO: NORMAL /HPF
PH UR STRIP.AUTO: 5.5 [PH]
PHOSPHATE SERPL-MCNC: 4.1 MG/DL (ref 2.3–4.1)
PLATELET # BLD AUTO: 219 THOUSANDS/UL (ref 149–390)
PMV BLD AUTO: 10.7 FL (ref 8.9–12.7)
POTASSIUM SERPL-SCNC: 4.2 MMOL/L (ref 3.5–5.3)
PROT UR STRIP-MCNC: NEGATIVE MG/DL
RBC # BLD AUTO: 4.28 MILLION/UL (ref 3.88–5.62)
RBC #/AREA URNS AUTO: NORMAL /HPF
SODIUM SERPL-SCNC: 141 MMOL/L (ref 135–147)
SP GR UR STRIP.AUTO: 1.01
UROBILINOGEN UR QL STRIP.AUTO: 0.2 E.U./DL
WBC # BLD AUTO: 15.41 THOUSAND/UL (ref 4.31–10.16)
WBC #/AREA URNS AUTO: NORMAL /HPF

## 2023-10-23 PROCEDURE — 87040 BLOOD CULTURE FOR BACTERIA: CPT | Performed by: INTERNAL MEDICINE

## 2023-10-23 PROCEDURE — 80069 RENAL FUNCTION PANEL: CPT | Performed by: STUDENT IN AN ORGANIZED HEALTH CARE EDUCATION/TRAINING PROGRAM

## 2023-10-23 PROCEDURE — 92526 ORAL FUNCTION THERAPY: CPT | Performed by: NURSE PRACTITIONER

## 2023-10-23 PROCEDURE — 94760 N-INVAS EAR/PLS OXIMETRY 1: CPT

## 2023-10-23 PROCEDURE — 85027 COMPLETE CBC AUTOMATED: CPT | Performed by: STUDENT IN AN ORGANIZED HEALTH CARE EDUCATION/TRAINING PROGRAM

## 2023-10-23 PROCEDURE — 94669 MECHANICAL CHEST WALL OSCILL: CPT

## 2023-10-23 PROCEDURE — 81001 URINALYSIS AUTO W/SCOPE: CPT | Performed by: INTERNAL MEDICINE

## 2023-10-23 PROCEDURE — 99233 SBSQ HOSP IP/OBS HIGH 50: CPT | Performed by: INTERNAL MEDICINE

## 2023-10-23 PROCEDURE — 94640 AIRWAY INHALATION TREATMENT: CPT

## 2023-10-23 PROCEDURE — 99223 1ST HOSP IP/OBS HIGH 75: CPT | Performed by: INTERNAL MEDICINE

## 2023-10-23 PROCEDURE — 99232 SBSQ HOSP IP/OBS MODERATE 35: CPT | Performed by: INTERNAL MEDICINE

## 2023-10-23 PROCEDURE — 94664 DEMO&/EVAL PT USE INHALER: CPT

## 2023-10-23 PROCEDURE — 83735 ASSAY OF MAGNESIUM: CPT | Performed by: STUDENT IN AN ORGANIZED HEALTH CARE EDUCATION/TRAINING PROGRAM

## 2023-10-23 RX ORDER — CEFTRIAXONE 2 G/50ML
2000 INJECTION, SOLUTION INTRAVENOUS EVERY 24 HOURS
Status: DISCONTINUED | OUTPATIENT
Start: 2023-10-23 | End: 2023-10-27

## 2023-10-23 RX ORDER — FUROSEMIDE 10 MG/ML
20 INJECTION INTRAMUSCULAR; INTRAVENOUS ONCE
Status: COMPLETED | OUTPATIENT
Start: 2023-10-23 | End: 2023-10-23

## 2023-10-23 RX ADMIN — PANTOPRAZOLE SODIUM 20 MG: 20 TABLET, DELAYED RELEASE ORAL at 05:00

## 2023-10-23 RX ADMIN — BENZONATATE 100 MG: 100 CAPSULE ORAL at 21:17

## 2023-10-23 RX ADMIN — GUAIFENESIN 1200 MG: 600 TABLET ORAL at 17:43

## 2023-10-23 RX ADMIN — SODIUM CHLORIDE SOLN NEBU 3% 4 ML: 3 NEBU SOLN at 07:08

## 2023-10-23 RX ADMIN — FLUTICASONE PROPIONATE 2 SPRAY: 50 SPRAY, METERED NASAL at 10:06

## 2023-10-23 RX ADMIN — BUDESONIDE 0.5 MG: 0.5 INHALANT ORAL at 20:05

## 2023-10-23 RX ADMIN — ATORVASTATIN CALCIUM 80 MG: 80 TABLET, FILM COATED ORAL at 16:23

## 2023-10-23 RX ADMIN — GABAPENTIN 300 MG: 300 CAPSULE ORAL at 21:17

## 2023-10-23 RX ADMIN — LEVALBUTEROL HYDROCHLORIDE 1.25 MG: 1.25 SOLUTION RESPIRATORY (INHALATION) at 07:08

## 2023-10-23 RX ADMIN — GUAIFENESIN 1200 MG: 600 TABLET ORAL at 10:05

## 2023-10-23 RX ADMIN — BUDESONIDE 0.5 MG: 0.5 INHALANT ORAL at 07:26

## 2023-10-23 RX ADMIN — FUROSEMIDE 40 MG: 40 TABLET ORAL at 10:05

## 2023-10-23 RX ADMIN — ASPIRIN 81 MG CHEWABLE TABLET 81 MG: 81 TABLET CHEWABLE at 21:17

## 2023-10-23 RX ADMIN — FLUTICASONE PROPIONATE 2 SPRAY: 50 SPRAY, METERED NASAL at 17:40

## 2023-10-23 RX ADMIN — BENZONATATE 100 MG: 100 CAPSULE ORAL at 10:05

## 2023-10-23 RX ADMIN — FAMOTIDINE 20 MG: 20 TABLET, FILM COATED ORAL at 21:17

## 2023-10-23 RX ADMIN — FUROSEMIDE 20 MG: 10 INJECTION, SOLUTION INTRAMUSCULAR; INTRAVENOUS at 16:25

## 2023-10-23 RX ADMIN — SODIUM CHLORIDE SOLN NEBU 3% 4 ML: 3 NEBU SOLN at 20:01

## 2023-10-23 RX ADMIN — BENZONATATE 100 MG: 100 CAPSULE ORAL at 16:23

## 2023-10-23 RX ADMIN — Medication 12.5 MG: at 10:05

## 2023-10-23 RX ADMIN — LEVALBUTEROL HYDROCHLORIDE 1.25 MG: 1.25 SOLUTION RESPIRATORY (INHALATION) at 14:14

## 2023-10-23 RX ADMIN — Medication 1 APPLICATION: at 12:56

## 2023-10-23 RX ADMIN — Medication 12.5 MG: at 21:17

## 2023-10-23 RX ADMIN — LEVALBUTEROL HYDROCHLORIDE 1.25 MG: 1.25 SOLUTION RESPIRATORY (INHALATION) at 20:01

## 2023-10-23 RX ADMIN — Medication 1 APPLICATION: at 05:04

## 2023-10-23 RX ADMIN — FORMOTEROL FUMARATE DIHYDRATE 20 MCG: 20 SOLUTION RESPIRATORY (INHALATION) at 07:40

## 2023-10-23 RX ADMIN — PREDNISONE 40 MG: 20 TABLET ORAL at 10:05

## 2023-10-23 RX ADMIN — Medication 1 APPLICATION: at 10:02

## 2023-10-23 RX ADMIN — CEFTRIAXONE 2000 MG: 2 INJECTION, SOLUTION INTRAVENOUS at 16:25

## 2023-10-23 RX ADMIN — FORMOTEROL FUMARATE DIHYDRATE 20 MCG: 20 SOLUTION RESPIRATORY (INHALATION) at 20:30

## 2023-10-23 NOTE — PLAN OF CARE
Problem: INFECTION - ADULT  Goal: Absence or prevention of progression during hospitalization  Description: INTERVENTIONS:  - Assess and monitor for signs and symptoms of infection  - Monitor lab/diagnostic results  - Monitor all insertion sites, i.e. indwelling lines, tubes, and drains  - Monitor endotracheal if appropriate and nasal secretions for changes in amount and color  - [place] appropriate cooling/warming therapies per order  - Administer medications as ordered  - Instruct and encourage patient and family to use good hand hygiene technique  - Identify and instruct in appropriate isolation precautions for identified infection/condition  Outcome: Progressing  Goal: Absence of fever/infection during neutropenic period  Description: INTERVENTIONS:  - Monitor WBC    Outcome: Progressing

## 2023-10-23 NOTE — PROGRESS NOTES
[id number] [facility]  Progress Note  Name: Iain Gonzalez  MRN: [id number]  Unit/Bed#: -01 I Date of Admission: 10/18/2023   Date of Service: 10/23/2023 I Hospital Day: 4    Assessment/Plan   Acute systolic congestive heart failure [facility])  Assessment & Plan  Wt Readings from Last 3 Encounters:   10/23/23 80.6 kg (177 lb 11.1 oz)   10/02/23 82.9 kg (182 lb 12.8 oz)   09/29/23 82.1 kg (181 lb)       -Continue oral regimen as patient's blood pressure allows unfortunately due to hypotension patient not able to obtain Entresto dosing for last couple days  -We will discontinue Entresto with attempt to maintain diuretic regimen to avoid volume overload and patient  -Continue metoprolol and will transition to succinate therapy at same dosing 12.5 mg twice daily  -We will give additional one-time IV furosemide 20 mg this afternoon and monitor response to assist with up titration and oral diuretic regimen  -Patient counseled on dietary lifestyle modifications including sodium and fluid restricted diet  -Continue to monitor patient clinically at this time. Chronic kidney disease, stage 3a [facility])  Assessment & Plan  Lab Results   Component Value Date    EGFR 59 10/23/2023    EGFR 60 10/22/2023    EGFR 57 10/21/2023    CREATININE 1.16 10/23/2023    CREATININE 1.15 10/22/2023    CREATININE 1.19 10/21/2023     -Continue to monitor renal function electrolytes with repletion goal potassium 4.0  and magnesium 2.0. Chronic respiratory failure with hypoxia (HCC)  Assessment & Plan  -Maintained on nasal cannula oxygen at home  -Continue plan of care per pulmonology and medicine team.  -We will attempt to uptitrate diuretic regimen as patient tolerates. * COPD with acute exacerbation [facility])  Assessment & Plan  -Plan of care per primary team and pulmonology. Subjective:   Patient seen and examined. No significant events overnight.         Summary comments:  -Continue current medical management with up titration as patient's blood pressure allows   -continue to monitor patient clinically at this time. Vitals: Blood pressure 124/73, pulse 102, temperature (!) 97.4 °F (36.3 °C), temperature source Axillary, resp. rate 16, height 5' 8" (1.727 m), weight 80.6 kg (177 lb 11.1 oz), SpO2 99 %.,   Orthostatic Blood Pressures      Flowsheet Row Most Recent Value   Blood Pressure 124/73 filed at 10/23/2023 0701   Patient Position - Orthostatic VS Lying filed at 10/23/2023 0701        ,   Weight (last 2 days)       Date/Time Weight    10/23/23 0600 80.6 (177.69)    10/22/23 1100 81.1 (178.68)    10/22/23 0952 81.1 (178.68)    10/22/23 0542 81.4 (179.56)     Weight: GREY standing scale at 10/22/23 0542    10/21/23 0534 84.2 (185.63)            Physical Exam:  Physical Exam  Vitals reviewed. Constitutional:       General: He is not in acute distress. Appearance: Normal appearance. He is not diaphoretic. HENT:      Head: Normocephalic and atraumatic. Comments: Nasal cannula oxygen in place  Eyes:      General:         Right eye: No discharge. Left eye: No discharge. Neck:      Comments: Trachea midline, mild JVD present  Cardiovascular:      Rate and Rhythm: Normal rate and regular rhythm. Heart sounds: No friction rub. Pulmonary:      Effort: Pulmonary effort is normal. No respiratory distress. Breath sounds: Wheezing present. Chest:      Chest wall: No tenderness. Abdominal:      General: Bowel sounds are normal.      Palpations: Abdomen is soft. Tenderness: There is no abdominal tenderness. There is no rebound. Musculoskeletal:      Right lower leg: No edema. Left lower leg: No edema. Skin:     General: Skin is warm and dry. Neurological:      Mental Status: He is alert. Comments: Awake, alert, hard of hearing, able to answer questions appropriately, able to move extremities bilaterally.    Psychiatric:         Mood and Affect: Mood normal. Behavior: Behavior normal.          Medications:      Current Facility-Administered Medications:     acetaminophen (TYLENOL) tablet 650 mg, 650 mg, Oral, Q6H PRN, Tesha Avila DO    albuterol (PROVENTIL HFA,VENTOLIN HFA) inhaler 2 puff, 2 puff, Inhalation, Q4H PRN, Tesha Tojanele, DO    albuterol inhalation solution 2.5 mg, 2.5 mg, Nebulization, Q6H PRN, Tesha Tojanele DO    aspirin chewable tablet 81 mg, 81 mg, Oral, HS, Tesha Tojanele, DO, 81 mg at 10/22/23 2135    atorvastatin (LIPITOR) tablet 80 mg, 80 mg, Oral, Daily With Dinner, Tesha Avila DO, 80 mg at 10/22/23 1801    benzonatate (TESSALON PERLES) capsule 100 mg, 100 mg, Oral, TID, Tesha Tojanele, DO, 100 mg at 10/23/23 1005    budesonide (PULMICORT) inhalation solution 0.5 mg, 0.5 mg, Nebulization, Q12H, Tesha Toterese, DO, 0.5 mg at 10/23/23 0726    cyanocobalamin injection 1,000 mcg, 1,000 mcg, Intramuscular, Q30 Days, Tesha Toterese, DO, 1,000 mcg at 10/19/23 7308    diphenhydrAMINE (BENADRYL) tablet 50 mg, 50 mg, Oral, HS PRN, Saul Larry PA-C, 50 mg at 10/22/23 2318    famotidine (PEPCID) tablet 20 mg, 20 mg, Oral, HS, Tesha Avila, DO, 20 mg at 10/22/23 2135    fluticasone (FLONASE) 50 mcg/act nasal spray 2 spray, 2 spray, Each Nare, BID, Tesha Toterese DO, 2 spray at 10/23/23 1006    formoterol (PERFOROMIST) nebulizer solution 20 mcg, 20 mcg, Nebulization, Q12H, Tesha Tojanele, DO, 20 mcg at 10/23/23 0740    furosemide (LASIX) injection 20 mg, 20 mg, Intravenous, Once, Rozina Rincon DO    furosemide (LASIX) tablet 40 mg, 40 mg, Oral, Daily, Pandi Todhe, DO, 40 mg at 10/23/23 1005    gabapentin (NEURONTIN) capsule 300 mg, 300 mg, Oral, HS, Pandi Todhe, DO, 300 mg at 10/22/23 2134    guaiFENesin (MUCINEX) 12 hr tablet 1,200 mg, 1,200 mg, Oral, BID, Pandi Todhe, DO, 1,200 mg at 10/23/23 1005    heparin (porcine) subcutaneous injection 5,000 Units, 5,000 Units, Subcutaneous, Q8H [address], 5,000 Units at 10/22/23 2140 **AND** [CANCELED] Platelet count, , , Once, Tesha Avila, DO    levalbuterol (XOPENEX) inhalation solution 1.25 mg, 1.25 mg, Nebulization, TID, 1.25 mg at 10/23/23 0708 **AND** [DISCONTINUED] sodium chloride 0.9 % inhalation solution 3 mL, 3 mL, Nebulization, TID, Tesha Tojanele, DO    metoprolol tartrate (LOPRESSOR) partial tablet 12.5 mg, 12.5 mg, Oral, Q12H [address], Tesha Avila DO, 12.5 mg at 10/23/23 1005    pantoprazole (PROTONIX) EC tablet 20 mg, 20 mg, Oral, Early Morning, Tesha Avila DO, 20 mg at 10/23/23 0500    predniSONE tablet 40 mg, 40 mg, Oral, Daily, aMine Minaya MD, 40 mg at 10/23/23 1005    sodium chloride (AYR SALINE NASAL) nasal gel 1 Application, 1 Application, Nasal, L1R PRN, Maine Minaya MD, 1 Application at 78/63/63 1002    sodium chloride 3 % inhalation solution 4 mL, 4 mL, Nebulization, BID, Tesha Avila DO, 4 mL at 10/23/23 0708     Labs & Results:    Troponins:    Results from last 7 days   Lab Units 10/18/23  1210   HS TNI 0HR ng/L 26        BNP:   Results from last 6 Months   Lab Units 10/21/23  1639 10/18/23  1210   BNP pg/mL 1,892* 774*     CBC with diff:   Results from last 7 days   Lab Units 10/23/23  0452 10/22/23  0542   WBC Thousand/uL 15.41* 11.36*   HEMOGLOBIN g/dL 12.9 13.5   HEMATOCRIT % 42.1 43.9   MCV fL 98 100*   PLATELETS Thousands/uL 219 207     TSH:     CMP:   Results from last 7 days   Lab Units 10/23/23  0452 10/22/23  0542 10/19/23  0513 10/18/23  1210   POTASSIUM mmol/L 4.2 4.1   < > 3.1*   CHLORIDE mmol/L 101 100   < > 105   CO2 mmol/L 34* 33*   < > 31   BUN mg/dL 32* 29*   < > 13   CREATININE mg/dL 1.16 1.15   < > 1.02   AST U/L  --   --   --  22   ALT U/L  --   --   --  20   EGFR ml/min/1.73sq m 59 60   < > 69    < > = values in this interval not displayed.      Lipid Profile:     Coags:

## 2023-10-23 NOTE — ASSESSMENT & PLAN NOTE
Wt Readings from Last 3 Encounters:   10/23/23 80.6 kg (177 lb 11.1 oz)   10/02/23 82.9 kg (182 lb 12.8 oz)   09/29/23 82.1 kg (181 lb)       -Continue oral regimen as patient's blood pressure allows unfortunately due to hypotension patient not able to obtain Entresto dosing for last couple days  -We will discontinue Entresto with attempt to maintain diuretic regimen to avoid volume overload and patient  -Continue metoprolol and will transition to succinate therapy at same dosing 12.5 mg twice daily  -We will give additional one-time IV furosemide 20 mg this afternoon and monitor response to assist with up titration and oral diuretic regimen  -Patient counseled on dietary lifestyle modifications including sodium and fluid restricted diet  -Continue to monitor patient clinically at this time.

## 2023-10-23 NOTE — PLAN OF CARE
Problem: PAIN - ADULT  Goal: Verbalizes/displays adequate comfort level or baseline comfort level  Description: Interventions:  - Encourage patient to monitor pain and request assistance  - Assess pain using appropriate pain scale  - Administer analgesics based on type and severity of pain and evaluate response  - Implement non-pharmacological measures as appropriate and evaluate response  - Consider cultural and social influences on pain and pain management  - Notify physician/advanced practitioner if interventions unsuccessful or patient reports new pain  Outcome: Progressing     Problem: INFECTION - ADULT  Goal: Absence of fever/infection during neutropenic period  Description: INTERVENTIONS:  - Monitor WBC    Outcome: Progressing     Problem: SAFETY ADULT  Goal: Maintains/Returns to pre admission functional level  Description: INTERVENTIONS:  - Perform BMAT or MOVE assessment daily.   - Set and communicate daily mobility goal to care team and patient/family/caregiver. - Collaborate with rehabilitation services on mobility goals if consulted  - Perform Range of Motion 3times a day. - Reposition patient every 2 hours.   - Dangle patient 2 times a day  - Stand patient 3 times a day  - Ambulate patient 3 times a day  - Out of bed to chair 3 times a day   - Out of bed for meals 3 times a day  - Out of bed for toileting  - Record patient progress and toleration of activity level   Outcome: Progressing

## 2023-10-23 NOTE — CONSULTS
Consultation - Infectious Disease   Jayla Little 77 y.o. male MRN: [id number]  Unit/Bed#: -01 Encounter: [id number]      IMPRESSION & RECOMMENDATIONS:   1. Polymicrobial bacteremia. It only 1 of 3 sets with 2 different organisms isolated. Suspect this could represent a contaminant with multiple organisms in 1 of 3 sets and currently growth. Less likely but possible would be transient bacteremia associated with pneumonia. Patient remained stable without any clinical sepsis. -Continue ceftriaxone for now awaiting additional data  -Recheck blood cultures x2 sets  -No additional work-up for now awaiting additional data  -Check CBC with differential and CMP  -Likely discontinue antibiotics if follow-up data continues to support contaminant    2. Possible community-acquired pneumonia. With equivocal findings noted on CT of the chest, and with a repeatedly normal procalcitonin level.  -Antibiotics as above for now  -Follow-up blood culture  -Monitor respiratory status    3. Acute on chronic respiratory failure. All in the setting of COPD exacerbation.  -Monitor respiratory status  -Oxygen support  -Antibiotics as above for now  -Steroids and breathing treatments  -Close pulmonary follow-up    4. COPD. O2 dependent with a possible acute exacerbation. Her status improved with steroids and breathing treatments and antibiotics    5.   Acute on chronic combined systolic and diastolic congestive heart failure    Have discussed the above management plan in detail with the primary service    Extensive review of the medical records in epic including review of the notes, radiographs, and laboratory results     HISTORY OF PRESENT ILLNESS:  Reason for Consult: Bacteremia  HPI: Jayla Little is a 77y.o. year old male with home O2 dependent COPD on 3 L by nasal cannula at home, chronic systolic congestive heart failure, coronary artery disease, chronic kidney disease, admitted to [facility] with worsening shortness of breath and cough in the few days prior to this admission who we are asked to assist with management of positive blood cultures. Patient prior to his admission did not have any fever chills or sweats. He did not have any chest pain, open wounds, or new rash. Presented with a forementioned symptoms, had blood cultures obtained and was started on ceftriaxone briefly and continue on azithromycin admitted for further management. He was treated for a COPD exacerbation with steroids and bronchodilators. Respiratory status is improved. However 4 days into his admission his blood cultures came back positive for gram-negative rods and gram-positive cocci in chains and clusters seen 1 out of 3 sets. No targets were identified on the BCI. REVIEW OF SYSTEMS:  A complete review of systems is negative other than that noted in the HPI.     PAST MEDICAL HISTORY:  Past Medical History:   Diagnosis Date    Abnormal ECG 2021 OR 2022    Alcoholism ([address]) 1984    sober 45 years    Arthritis 2008    beginning    Bilateral carotid artery stenosis     Callus     Cancer (HCC)     skin    Chronic diastolic heart failure (HCC)     Chronic ischemic heart disease     Chronic kidney disease     stage 3    Colon polyp     COPD (chronic obstructive pulmonary disease) (HCC)     Coronary artery disease     hx stents, MI, PCI    COVID 11/2021    CPAP (continuous positive airway pressure) dependence     Disease of thyroid gland 2017    nodules    Emphysema of lung ([address]) 1995    started    Hearing loss     History of transfusion 1995    Hyperlipidemia     Hypertension     Intracranial aneurysm 04/25/2023    Lung nodule 2023    x rays    MI (myocardial infarction) ([address]) 1995    Myocardial infarction ([address]) 1995    Pneumonia     Pneumothorax 02/20/2023    collapsed lung    Prostate cancer ([address])     RSV (respiratory syncytial virus infection) 10/2022    S/P carotid endarterectomy     Shortness of breath     O2 2 l/nc PRN Sleep apnea     Sleep apnea, obstructive     Stented coronary artery      Past Surgical History:   Procedure Laterality Date    APPENDECTOMY      CARDIAC CATHETERIZATION  03/18/2021    left main with no significant disease, proximal LAD with 10% stenosis at the site of prior stent, left circumflex artery with minimal luminal irregularities mid RCA with 50% stenosis at site of prior stent and PL segment with distal disease supplied by collaterals from the distal circumflex with no significant CAD requiring revascularization at that time.     CATARACT EXTRACTION, BILATERAL Bilateral     COLONOSCOPY      CORONARY ANGIOPLASTY WITH STENT PLACEMENT  1999    RCA    CORONARY ANGIOPLASTY WITH STENT PLACEMENT  2001    RCA    CORONARY ANGIOPLASTY WITH STENT PLACEMENT  2003    LAD    EYE SURGERY      GA BX PROSTATE STRTCTC SATURATION SAMPLING IMG GID N/A 09/13/2022    Procedure: TRANSPERINEAL MRI FUSION  BIOPSY PROSTATE;  Surgeon: Jose Schaefer MD;  [facility];  Service: Urology    GA NEUROPLASTY &/TRANSPOS MEDIAN NRV Billy Ahle Left 07/22/2022    Procedure: RELEASE CARPAL TUNNEL;  Surgeon: Pasha Calvillo MD;  [facility];  Service: Orthopedics    GA NEUROPLASTY &/TRANSPOSITION ULNAR NERVE ELBOW Left 07/22/2022    Procedure: RELEASE CUBITAL TUNNEL;  Surgeon: Pasha Calvillo MD;  [facility];  Service: Orthopedics    GA NEUROPLASTY &/TRANSPOSITION ULNAR NERVE WRIST Left 07/22/2022    Procedure: Maria E Hinckley RELEASE;  Surgeon: Pasha Calvillo MD;  [facility];  Service: Orthopedics    SKIN CANCER EXCISION  2012    chin-per pt, basal cell  also right ankle    TONSILLECTOMY  no       FAMILY HISTORY:  Non-contributory    SOCIAL HISTORY:  Social History   Social History     Substance and Sexual Activity   Alcohol Use Not Currently     Social History     Substance and Sexual Activity   Drug Use Never     Social History     Tobacco Use   Smoking Status Former    Packs/day: 2.00    Years: 35.00    Total pack years: 70.00    Types: Cigarettes    Start date: 1960    Quit date: 1995    Years since quittin.3   Smokeless Tobacco Never   Tobacco Comments    stopped smoking the day i had a heart attack       ALLERGIES:  Allergies   Allergen Reactions    Lisinopril Swelling and Cough    Tetanus Antitoxin Anaphylaxis    Tetanus Toxoid Anaphylaxis and Swelling       MEDICATIONS:  All current active medications have been reviewed. Antibiotics: Ceftriaxone    PHYSICAL EXAM:  Temp:  [96.5 °F (35.8 °C)-97.4 °F (36.3 °C)] 96.7 °F (35.9 °C)  HR:  [] 98  Resp:  [16-18] 18  BP: ()/(56-73) 110/70  SpO2:  [93 %-99 %] 98 %  Temp (24hrs), Av.9 °F (36.1 °C), Min:96.5 °F (35.8 °C), Max:97.4 °F (36.3 °C)  Current: Temperature: (!) 96.7 °F (35.9 °C)    Intake/Output Summary (Last 24 hours) at 10/23/2023 1634  Last data filed at 10/23/2023 1414  Gross per 24 hour   Intake --   Output 575 ml   Net -575 ml       General Appearance:  Appearing well, nontoxic, and in no distress   Head:  Normocephalic, without obvious abnormality, atraumatic   Eyes:  Conjunctiva pink and sclera anicteric, both eyes   Nose: Nares normal, mucosa normal, no drainage   Throat: Oropharynx moist without lesions. Edentulous   Neck: Supple, symmetrical, no adenopathy, no tenderness/mass/nodules   Back:   Symmetric, no curvature, ROM normal, no CVA tenderness   Lungs:   Decreased breath sounds bilaterally, respirations unlabored   Chest Wall:  No tenderness or deformity   Heart:  RRR; no murmur, rub or gallop   Abdomen:   Soft, non-tender, non-distended, positive bowel sounds    Extremities: No cyanosis, clubbing or edema   Skin: No rashes or lesions. No draining wounds noted. Lymph nodes: Cervical, supraclavicular nodes normal   Neurologic: Alert and oriented times 3, extremity strength 5/5 and symmetric       LABS, IMAGING, & OTHER STUDIES:  Lab Results:  I have personally reviewed pertinent labs.   Results from last 7 days   Lab Units 10/23/23  0452 10/22/23  0542 10/21/23  0445   WBC Thousand/uL 15.41* 11.36* 14.41*   HEMOGLOBIN g/dL 12.9 13.5 13.0   PLATELETS Thousands/uL 219 207 213     Results from last 7 days   Lab Units 10/23/23  0452 10/22/23  0542 10/21/23  0445 10/19/23  0513 10/18/23  1210   SODIUM mmol/L 141 141 139   < > 143   POTASSIUM mmol/L 4.2 4.1 4.5   < > 3.1*   CHLORIDE mmol/L 101 100 101   < > 105   CO2 mmol/L 34* 33* 31   < > 31   BUN mg/dL 32* 29* 27*   < > 13   CREATININE mg/dL 1.16 1.15 1.19   < > 1.02   EGFR ml/min/1.73sq m 59 60 57   < > 69   CALCIUM mg/dL 8.4 8.7 8.7   < > 8.6   AST U/L  --   --   --   --  22   ALT U/L  --   --   --   --  20   ALK PHOS U/L  --   --   --   --  53    < > = values in this interval not displayed. Results from last 7 days   Lab Units 10/18/23  1751 10/18/23  1714 10/18/23  1324 10/18/23  1323   BLOOD CULTURE   --   --   --  No Growth After 4 Days. SPUTUM CULTURE  4+ Growth of  Commensal respiratory robi only; No significant growth of Staph aureus/MRSA or Pseudomonas aeruginosa. --   --   --    GRAM STAIN RESULT  1+ Epithelial cells per low power field*  1+ Polys*  1+ Gram positive cocci in clusters*  --  Gram negative rods*  Gram positive cocci in chains and clusters*  --    LEGIONELLA URINARY ANTIGEN   --  Negative  --   --      Results from last 7 days   Lab Units 10/20/23  0641 10/19/23  0513 10/18/23  1210   PROCALCITONIN ng/ml 0.09 0.09 0.10                   Imaging Studies:     CT chest.  Debris right lower lobe bronchus with atelectasis. Severe emphysema.   Evidence of pulmonary hypertension    Images personally reviewed by me in PACS

## 2023-10-23 NOTE — ASSESSMENT & PLAN NOTE
Lab Results   Component Value Date    EGFR 59 10/23/2023    EGFR 60 10/22/2023    EGFR 57 10/21/2023    CREATININE 1.16 10/23/2023    CREATININE 1.15 10/22/2023    CREATININE 1.19 10/21/2023     -Continue to monitor renal function electrolytes with repletion goal potassium 4.0  and magnesium 2.0.

## 2023-10-23 NOTE — SPEECH THERAPY NOTE
Speech Language/Pathology    Speech/Language Pathology Progress Note    Patient Name: Gail Ng  ROJDS'Z Date: 10/23/2023     Problem List  Principal Problem:    COPD with acute exacerbation [facility])  Active Problems:    Coronary artery disease involving native coronary artery of native heart without angina pectoris    Hypertension    LAMBERTO on CPAP    Chronic respiratory failure with hypoxia ([address])    Pneumonia of right lower lobe due to infectious organism    Chronic kidney disease, stage 3a ([address])    Acute systolic congestive heart failure ([address])       Past Medical History  Past Medical History:   Diagnosis Date    Abnormal ECG 2021 OR 2022    Alcoholism ([address]) 1984    sober 38 years    Arthritis 2008    beginning    Bilateral carotid artery stenosis     Callus     Cancer (HCC)     skin    Chronic diastolic heart failure (HCC)     Chronic ischemic heart disease     Chronic kidney disease     stage 3    Colon polyp     COPD (chronic obstructive pulmonary disease) (HCC)     Coronary artery disease     hx stents, MI, PCI    COVID 11/2021    CPAP (continuous positive airway pressure) dependence     Disease of thyroid gland 2017    nodules    Emphysema of lung ([address]) 1995    started    Hearing loss     History of transfusion 1995    Hyperlipidemia     Hypertension     Intracranial aneurysm 04/25/2023    Lung nodule 2023    x rays    MI (myocardial infarction) ([address]) 1995    Myocardial infarction ([address]) 1995    Pneumonia     Pneumothorax 02/20/2023    collapsed lung    Prostate cancer ([address])     RSV (respiratory syncytial virus infection) 10/2022    S/P carotid endarterectomy     Shortness of breath     O2 2 l/nc PRN    Sleep apnea     Sleep apnea, obstructive     Stented coronary artery         Past Surgical History  Past Surgical History:   Procedure Laterality Date    APPENDECTOMY      CARDIAC CATHETERIZATION  03/18/2021    left main with no significant disease, proximal LAD with 10% stenosis at the site of prior stent, left circumflex artery with minimal luminal irregularities mid RCA with 50% stenosis at site of prior stent and PL segment with distal disease supplied by collaterals from the distal circumflex with no significant CAD requiring revascularization at that time. CATARACT EXTRACTION, BILATERAL Bilateral     COLONOSCOPY      CORONARY ANGIOPLASTY WITH STENT PLACEMENT  1999    RCA    CORONARY ANGIOPLASTY WITH STENT PLACEMENT  2001    RCA    CORONARY ANGIOPLASTY WITH STENT PLACEMENT  2003    LAD    EYE SURGERY      AL BX PROSTATE STRTCTC SATURATION SAMPLING IMG GID N/A 09/13/2022    Procedure: TRANSPERINEAL MRI FUSION  BIOPSY PROSTATE;  Surgeon: Krupa Gray MD;  [facility];  Service: Urology    AL NEUROPLASTY &/TRANSPOS MEDIAN NRV CARPAL Madeleine Rook Left 07/22/2022    Procedure: RELEASE CARPAL TUNNEL;  Surgeon: Paloma Grijalva MD;  [facility];  Service: Orthopedics    AL NEUROPLASTY &/TRANSPOSITION ULNAR NERVE ELBOW Left 07/22/2022    Procedure: RELEASE CUBITAL TUNNEL;  Surgeon: Paloma Grijalva MD;  [facility];  Service: Orthopedics    AL NEUROPLASTY &/TRANSPOSITION ULNAR NERVE WRIST Left 07/22/2022    Procedure: Sinan Stanton RELEASE;  Surgeon: Paloma Grijalva MD;  [facility];  Service: Orthopedics    SKIN CANCER EXCISION  2012    chin-per pt, basal cell  also right ankle    TONSILLECTOMY  no         Subjective:  Pt reporting that he is tolerating diet well without difficulty. Objective:  Pt received upright in bed consuming dinner tray. Pt very [place] and communicating via white board to answer questions    Assessment:  Assessed with meal tray which patient only ate a few bites from as he didn't like the way it tasted. Pt tolerating baseline diet well without difficulty observed or reported.      Plan/Recommendations:     Recommend to continue same diet recommendations  Diet Solid Recommendation Level 2 Dysphagia/ mechanical soft/altered   Diet Liquid Recommendation Nectar thick liquid   Recommended Form of Meds Crushed; With puree   General Precautions Aspiration precautions; Feed only when alert;Minimize distractions;Upright as possible for all oral intake;Remain upright for 45 mins after meals   Compensatory Swallowing Strategies Alternate solids and liquids; No straws

## 2023-10-23 NOTE — PROGRESS NOTES
[id number] [facility]  Progress Note  Name: Kendell Ritchie  MRN: [id number]  Unit/Bed#: -01 I Date of Admission: 10/18/2023   Date of Service: 10/23/2023 I Hospital Day: 4    Assessment/Plan   Pneumonia of right lower lobe due to infectious organism  Assessment & Plan  74-year-old male patient with COPD on 3 L/min of oxygen presents with increased productive cough, shortness of breath, wheezing and increased O2 requirements up to 4L NC    CXR in ER showing New bilateral lower lung pneumonia. Received ceftriaxone and and azithromycin on admission    Blood cultures from admission positive for gram-negative rods and gram-positive cocci. We will continue ceftriaxone. Follow-up final cultures and sensitivities. Consult infectious disease. Repeat blood cultures in the morning. Supportive care      * COPD with acute exacerbation [facility])  Assessment & Plan  Presents with increased work of breathing wheezing and coughing  Suspect patient is in COPD exacerbation secondary to pneumonia  Received Solu-Medrol 125 mg in the ER and DuoNeb treatment  Patient improved with IV steroids. Now on oral prednisone, will continue to taper  Finished 3 days of Azithromycin  Ipratropium and Xopenex nebulizers 3 times daily  Continue nebulizers    Pulmonology input appreciated    Acute systolic congestive heart failure [facility])  Assessment & Plan  Wt Readings from Last 3 Encounters:   10/22/23 81.4 kg (179 lb 9 oz)   10/02/23 82.9 kg (182 lb 12.8 oz)   09/29/23 82.1 kg (181 lb)   Clinically improving with IV diuresis.   Plan to transition to oral Lasix today  Cardiology input appreciated  Entresto discontinued due to low blood pressures  Continue current cardiac medications  Monitor PENG's Daily weight    Chronic kidney disease, stage 3a [facility])  Assessment & Plan  Lab Results   Component Value Date    EGFR 57 10/21/2023    EGFR 67 10/20/2023    EGFR 73 10/19/2023    CREATININE 1.19 10/21/2023    CREATININE 1.05 10/20/2023    CREATININE 0.98 10/19/2023     Baseline Cr 1-1.2  At baseline    Chronic respiratory failure with hypoxia (HCC)  Assessment & Plan  Chronic respiratory failure with hypoxia due to chronic COPD a/e/b need for continuous O2, normally at 3 LPM, on 4 LPM when admitted  Back to baseline O2 requirements    LAMBERTO on CPAP  Assessment & Plan  Continue cpap qhs    Hypertension  Assessment & Plan  Continue home Toprol-XL    Coronary artery disease involving native coronary artery of native heart without angina pectoris  Assessment & Plan  Continue home aspirin 81 mg daily, metoprolol 12.5 mg twice daily, and resume home Crestor 40 mg daily           VTE Prophylaxis:  Heparin    Patient Centered Rounds: I have performed bedside rounds with nursing staff today. Discussions with Specialists or Other Care Team Provider: yes  Education and Discussions with Family / Patient: Updated patient's wife at bedside regarding plan of care    Current Length of Stay: 4 day(s)    Current Patient Status: Inpatient   Certification Statement: The patient will continue to require additional inpatient hospital stay due to bacteremia    Discharge Plan: Hopeful discharge home once medically stable    Code Status: Level 2 - DNAR: but accepts endotracheal intubation    Subjective:   No overnight events noted. Blood cultures positive, currently on antibiotics    Objective:     Vitals:   Temp (24hrs), Av.9 °F (36.1 °C), Min:96.5 °F (35.8 °C), Max:97.4 °F (36.3 °C)    Temp:  [96.5 °F (35.8 °C)-97.4 °F (36.3 °C)] 96.7 °F (35.9 °C)  HR:  [] 98  Resp:  [16-18] 18  BP: ()/(56-73) 110/70  SpO2:  [93 %-99 %] 98 %  Body mass index is 27.02 kg/m². Input and Output Summary (last 24 hours):        Intake/Output Summary (Last 24 hours) at 10/23/2023 1630  Last data filed at 10/23/2023 1414  Gross per 24 hour   Intake --   Output 575 ml   Net -575 ml       Physical Exam:   Physical Exam  Constitutional:       General: He is not in acute distress. HENT:      Head: Normocephalic and atraumatic. Nose: Nose normal.      Mouth/Throat:      Mouth: Mucous membranes are moist.   Eyes:      Extraocular Movements: Extraocular movements intact. Conjunctiva/sclera: Conjunctivae normal.   Cardiovascular:      Rate and Rhythm: Normal rate and regular rhythm. Pulmonary:      Effort: Pulmonary effort is normal.      Breath sounds: Rhonchi present. Abdominal:      Palpations: Abdomen is soft. Tenderness: There is no abdominal tenderness. Musculoskeletal:         General: Normal range of motion. Cervical back: Normal range of motion and neck supple. Comments: Generalized weakness   Skin:     General: Skin is warm and dry. Neurological:      General: No focal deficit present. Mental Status: He is alert. Mental status is at baseline. Cranial Nerves: No cranial nerve deficit. Psychiatric:         Mood and Affect: Mood normal.         Behavior: Behavior normal.         Additional Data:     Labs:    Results from last 7 days   Lab Units 10/23/23  0452 10/19/23  0601 10/18/23  1210   WBC Thousand/uL 15.41*   < > 6.77   HEMOGLOBIN g/dL 12.9   < > 13.6   HEMATOCRIT % 42.1   < > 44.0   PLATELETS Thousands/uL 219   < > 140*   NEUTROS PCT %  --   --  75   LYMPHS PCT %  --   --  13*   MONOS PCT %  --   --  8   EOS PCT %  --   --  4    < > = values in this interval not displayed. Results from last 7 days   Lab Units 10/23/23  0452 10/19/23  0513 10/18/23  1210   SODIUM mmol/L 141   < > 143   POTASSIUM mmol/L 4.2   < > 3.1*   CHLORIDE mmol/L 101   < > 105   CO2 mmol/L 34*   < > 31   BUN mg/dL 32*   < > 13   CREATININE mg/dL 1.16   < > 1.02   CALCIUM mg/dL 8.4   < > 8.6   ALK PHOS U/L  --   --  53   ALT U/L  --   --  20   AST U/L  --   --  22    < > = values in this interval not displayed. * I Have Reviewed All Lab Data Listed Above. * Additional Pertinent Lab Tests Reviewed:  [facility] Admission  Reviewed    Imaging:  Imaging Reports Reviewed Today Include: No new imaging    Recent Cultures (last 7 days):     Results from last 7 days   Lab Units 10/18/23  1751 10/18/23  1714 10/18/23  1324 10/18/23  1323   BLOOD CULTURE   --   --   --  No Growth After 4 Days. SPUTUM CULTURE  4+ Growth of  Commensal respiratory robi only; No significant growth of Staph aureus/MRSA or Pseudomonas aeruginosa.   --   --   --    GRAM STAIN RESULT  1+ Epithelial cells per low power field*  1+ Polys*  1+ Gram positive cocci in clusters*  --  Gram negative rods*  Gram positive cocci in chains and clusters*  --    LEGIONELLA URINARY ANTIGEN   --  Negative  --   --        Last 24 Hours Medication List:   Current Facility-Administered Medications   Medication Dose Route Frequency Provider Last Rate    acetaminophen  650 mg Oral Q6H PRN Pandi Todhe, DO      albuterol  2 puff Inhalation Q4H PRN Pandi Todhe, DO      albuterol  2.5 mg Nebulization Q6H PRN Briseydai Tojanele, DO      aspirin  81 mg Oral HS Pandi Tojanele, DO      atorvastatin  80 mg Oral Daily With Dominguez Oil, DO      benzonatate  100 mg Oral TID Pandi Todhe, DO      budesonide  0.5 mg Nebulization Q12H Briseydai Tojanele, DO      cefTRIAXone  2,000 mg Intravenous Q24H Laurie Silveira MD 2,000 mg (10/23/23 1625)    cyanocobalamin  1,000 mcg Intramuscular Q30 Days Pandi Todhe, DO      diphenhydrAMINE  50 mg Oral HS PRN Rich Sanchez PA-C      famotidine  20 mg Oral HS Pandi Todhe, DO      fluticasone  2 spray Each Nare BID Pandi Todhe, DO      formoterol  20 mcg Nebulization Q12H Pandi Todhe, DO      furosemide  20 mg Intravenous Once Richar Parents, DO      furosemide  40 mg Oral Daily Pandi Todhe, DO      gabapentin  300 mg Oral HS Pandi Todhe, DO      guaiFENesin  1,200 mg Oral BID Pandi Todhe, DO      heparin (porcine)  5,000 Units Subcutaneous Q8H [facility] Pandi Todhe, DO      levalbuterol  1.25 mg Nebulization TID Briseydai Todhe, DO      metoprolol tartrate  12.5 mg Oral Q12H [address] Tesha Avila DO      pantoprazole  20 mg Oral Early Morning Tesha Avila DO      predniSONE  40 mg Oral Daily Marian Delacruz MD      sodium chloride  1 Application Nasal J2C PRN Marian Delacruz MD      sodium chloride  4 mL Nebulization BID Tesha Avila DO          Today, Patient Was Seen By: Kevin Paul MD    ** Please Note: Dictation voice to text software may have been used in the creation of this document.  **

## 2023-10-23 NOTE — ASSESSMENT & PLAN NOTE
-Maintained on nasal cannula oxygen at home  -Continue plan of care per pulmonology and medicine team.  -We will attempt to uptitrate diuretic regimen as patient tolerates.

## 2023-10-23 NOTE — PROGRESS NOTES
Progress Note - Pulmonary   Can Rudd 77 y.o. male MRN: [id number]  Unit/Bed#: -01 Encounter: [id number]    Assessment:  Acute on chronic hypoxemic respiratory failure  Acute on chronic combined systolic/diastolic heart failure  COPD- possible component of exacerbation  Possible community acquired pneumonia  Bacteremia    Plan:  Wean O2 for SPO2 >88%  Prednisone taper- decrease by 10 mg every 3 days then stop  Continue nebulized bronchodilator regimen  Antibiotics per primary team but given blood culture results would recommend starting ceftriaxone, consult ID  Diuresis per primary team and cardiology  The patient follows with Dr Kaitlin Rosales at [facility] pulmonary office and should see her or the [facility] [address] [place] [address] within a couple weeks of discharge. Discussed with Dr Anamika Agrawal in person        Subjective:   Pulmonary was consulted yesterday for acute on chronic respiratory failure  He was maintained on oxygen- steroids continued tapered to 40 mg prednisone  Monitored off antibiotics  Diuretics recommended per primary/cardiology    Today feels a bit better. Occasional cough. Not short of breath      Objective:     Vitals: Blood pressure 110/70, pulse 98, temperature (!) 96.7 °F (35.9 °C), temperature source Temporal, resp. rate 18, height 5' 8" (1.727 m), weight 80.6 kg (177 lb 11.1 oz), SpO2 98 %. ,Body mass index is 27.02 kg/m².       Intake/Output Summary (Last 24 hours) at 10/23/2023 1500  Last data filed at 10/23/2023 1414  Gross per 24 hour   Intake --   Output 575 ml   Net -575 ml       Invasive Devices       Peripheral Intravenous Line  Duration             Peripheral IV 10/18/23 Right Antecubital 5 days                  Vitals:    10/23/23 1441   BP: 110/70   Pulse: 98   Resp:    Temp:    SpO2: 98%     General:  Patient is awake, alert, non-toxic and in no acute respiratory distress  Eyes: PERRL, no scleral icterus  Neck: No JVD  CV:  Regular, +S1 and S2, No murmurs, gallops or rubs appreciated  Lungs: Clear to auscultation  Abdomen: Soft, +BS, Non-tender, non-distended  Extremities: No clubbing, cyanosis or edema  Neuro: No focal motor/sensory deficits  Skin: Warm, No rashes or ulcerations      Labs: I have personally reviewed pertinent lab results.   Component      Latest Ref Rng 9/12/2023 10/18/2023   WBC      4.31 - 10.16 Thousand/uL 7.24  6.77    Red Blood Cell Count      3.88 - 5.62 Million/uL 4.72  4.44    Hemoglobin      12.0 - 17.0 g/dL 14.8  13.6    HCT      36.5 - 49.3 % 46.5  44.0    MCV      82 - 98 fL 99 (H)  99 (H)    MCH      26.8 - 34.3 pg 31.4  30.6    MCHC      31.4 - 37.4 g/dL 31.8  30.9 (L)    RDW      11.6 - 15.1 % 14.3  15.5 (H)    MPV      8.9 - 12.7 fL 11.2  11.0    Platelet Count      756 - 390 Thousands/uL 185  140 (L)    nRBC      /100 WBCs 0  0    Neutrophils %      43 - 75 % 74  75    Immat GRANS %      0 - 2 % 0  0    Lymphocytes Relative      14 - 44 % 10 (L)  13 (L)    Monocytes Relative      4 - 12 % 12  8    Eosinophils      0 - 6 % 3  4    Basophils Relative      0 - 1 % 1  0    Absolute Neutrophils      1.85 - 7.62 Thousands/µL 5.37  5.10    Immature Grans Absolute      0.00 - 0.20 Thousand/uL 0.03  0.02    Lymphocytes Absolute      0.60 - 4.47 Thousands/µL 0.74  0.85    Absolute Monocytes      0.17 - 1.22 Thousand/µL 0.85  0.53    Absolute Eosinophils      0.00 - 0.61 Thousand/µL 0.21  0.24    Basophils Absolute      0.00 - 0.10 Thousands/µL 0.04  0.03      Component      Latest Ref Rng 10/19/2023 10/20/2023   WBC      4.31 - 10.16 Thousand/uL 9.17  14.27 (H)    Red Blood Cell Count      3.88 - 5.62 Million/uL 4.55  4.30    Hemoglobin      12.0 - 17.0 g/dL 13.9  13.0    HCT      36.5 - 49.3 % 44.7  42.7    MCV      82 - 98 fL 98  99 (H)    MCH      26.8 - 34.3 pg 30.5  30.2    MCHC      31.4 - 37.4 g/dL 31.1 (L)  30.4 (L)    RDW      11.6 - 15.1 % 15.5 (H)  16.1 (H)    MPV      8.9 - 12.7 fL 11.1  11.4    Platelet Count      756 - 390 Thousands/uL 145 (L)  204    nRBC      /100 WBCs     Neutrophils %      43 - 75 %     Immat GRANS %      0 - 2 %     Lymphocytes Relative      14 - 44 %     Monocytes Relative      4 - 12 %     Eosinophils      0 - 6 %     Basophils Relative      0 - 1 %     Absolute Neutrophils      1.85 - 7.62 Thousands/µL     Immature Grans Absolute      0.00 - 0.20 Thousand/uL     Lymphocytes Absolute      0.60 - 4.47 Thousands/µL     Absolute Monocytes      0.17 - 1.22 Thousand/µL     Absolute Eosinophils      0.00 - 0.61 Thousand/µL     Basophils Absolute      0.00 - 0.10 Thousands/µL       Component      Latest Ref [facility] 10/21/2023 10/22/2023   WBC      4.31 - 10.16 Thousand/uL 14.41 (H)  11.36 (H)    Red Blood Cell Count      3.88 - 5.62 Million/uL 4.23  4.41    Hemoglobin      12.0 - 17.0 g/dL 13.0  13.5    HCT      36.5 - 49.3 % 42.3  43.9    MCV      82 - 98 fL 100 (H)  100 (H)    MCH      26.8 - 34.3 pg 30.7  30.6    MCHC      31.4 - 37.4 g/dL 30.7 (L)  30.8 (L)    RDW      11.6 - 15.1 % 16.3 (H)  16.2 (H)    MPV      8.9 - 12.7 fL 11.2  11.1    Platelet Count      314 - 390 Thousands/uL 213  207    nRBC      /100 WBCs     Neutrophils %      43 - 75 %     Immat GRANS %      0 - 2 %     Lymphocytes Relative      14 - 44 %     Monocytes Relative      4 - 12 %     Eosinophils      0 - 6 %     Basophils Relative      0 - 1 %     Absolute Neutrophils      1.85 - 7.62 Thousands/µL     Immature Grans Absolute      0.00 - 0.20 Thousand/uL     Lymphocytes Absolute      0.60 - 4.47 Thousands/µL     Absolute Monocytes      0.17 - 1.22 Thousand/µL     Absolute Eosinophils      0.00 - 0.61 Thousand/µL     Basophils Absolute      0.00 - 0.10 Thousands/µL       Component      Latest Ref [facility] 10/23/2023   WBC      4.31 - 10.16 Thousand/uL 15.41 (H)    Red Blood Cell Count      3.88 - 5.62 Million/uL 4.28    Hemoglobin      12.0 - 17.0 g/dL 12.9    HCT      36.5 - 49.3 % 42.1    MCV      82 - 98 fL 98    MCH      26.8 - 34.3 pg 30.1    MCHC      31.4 - 37.4 g/dL 30.6 (L)    RDW 11.6 - 15.1 % 16.0 (H)    MPV      8.9 - 12.7 fL 10.7    Platelet Count      483 - 390 Thousands/uL 219    nRBC      /100 WBCs    Neutrophils %      43 - 75 %    Immat GRANS %      0 - 2 %    Lymphocytes Relative      14 - 44 %    Monocytes Relative      4 - 12 %    Eosinophils      0 - 6 %    Basophils Relative      0 - 1 %    Absolute Neutrophils      1.85 - 7.62 Thousands/µL    Immature Grans Absolute      0.00 - 0.20 Thousand/uL    Lymphocytes Absolute      0.60 - 4.47 Thousands/µL    Absolute Monocytes      0.17 - 1.22 Thousand/µL    Absolute Eosinophils      0.00 - 0.61 Thousand/µL    Basophils Absolute      0.00 - 0.10 Thousands/µL           Lab Results   Component Value Date    SODIUM 141 10/23/2023    K 4.2 10/23/2023     10/23/2023    CO2 34 (H) 10/23/2023    BUN 32 (H) 10/23/2023    CREATININE 1.16 10/23/2023    GLUC 157 (H) 10/23/2023    CALCIUM 8.4 10/23/2023     Component      Latest Ref Rng 10/18/2023 10/20/2023   Strep pneumoniae antigen, urine      Negative  Negative     Legionella Urinary Antigen      Negative  Negative     Procalcitonin      <=0.25 ng/ml  0.09    BNP      0 - 100 pg/mL       Component      Latest Ref Rng 10/21/2023   Strep pneumoniae antigen, urine      Negative     Legionella Urinary Antigen      Negative     Procalcitonin      <=0.25 ng/ml    BNP      0 - 100 pg/mL 1,892 (H)       Legend:  (H) High    Blood cultures- GNR and GPC 10/18  Sputum culture MRF 10/18  Imaging and other studies: I have personally reviewed pertinent reports. and I have personally reviewed pertinent films in PACS    CT Chest 10/21/23  Septal thickening due to interstitial edema with small right and trace left effusion. Debris in the right lower lobe bronchi with atelectasis of the posterior basal right lower lobe. Minimal atelectasis of the posterior basal left lower lobe. Severe upper lobe predominant panlobular emphysema.      Pulmonary artery enlargement which can be seen with pulmonary hypertension. TTE 8/2023    Left Ventricle: Left ventricular cavity size is mildly dilated. Wall thickness is normal. The left ventricular ejection fraction is 35%. Systolic function is moderately reduced in a global manner. Right Ventricle: Right ventricular cavity size is mildly dilated. Systolic function is mildly reduced. Normal tricuspid annular plane systolic excursion (TAPSE) > 1.7 cm. Left Atrium: The atrium is mildly dilated. Aortic Valve: There is aortic valve sclerosis but no stenosis. Mitral Valve: There is mild regurgitation. Tricuspid Valve: There is mild regurgitation. The right ventricular systolic pressure is mildly elevated. The estimated right ventricular systolic pressure is 25.24 mmHg. PFT 6/2023  Results:  FEV1/FVC Ratio: 44%  FEV1: 1.06 L     38% predicted  Forced Vital Capacity: 2.43 L    66% predicted  After administration of bronchodilator: No significant change     Lung volumes:  Total Lung Capacity 107% predicted Residual volume 175% predicted     DLCO corrected for patients hemoglobin level: 26% predicted     Flow volume loop: Consistent with obstruction        Interpretation:     Spirometry with severe obstruction and reduced vital capacity due to air trapping  No change with bronchodilators  Volumes with moderate to severe air trapping  Severely reduced diffusion capacity

## 2023-10-24 ENCOUNTER — TELEPHONE (OUTPATIENT)
Dept: SURGERY | Facility: CLINIC | Age: 78
End: 2023-10-24

## 2023-10-24 DIAGNOSIS — J44.9 CHRONIC OBSTRUCTIVE PULMONARY DISEASE, UNSPECIFIED COPD TYPE (HCC): ICD-10-CM

## 2023-10-24 LAB
ALBUMIN SERPL BCP-MCNC: 3.7 G/DL (ref 3.5–5)
ALL TARGETS: NOT DETECTED
ANION GAP SERPL CALCULATED.3IONS-SCNC: 7 MMOL/L
BUN SERPL-MCNC: 33 MG/DL (ref 5–25)
CALCIUM SERPL-MCNC: 8.6 MG/DL (ref 8.4–10.2)
CHLORIDE SERPL-SCNC: 98 MMOL/L (ref 96–108)
CO2 SERPL-SCNC: 36 MMOL/L (ref 21–32)
CREAT SERPL-MCNC: 1.15 MG/DL (ref 0.6–1.3)
ERYTHROCYTE [DISTWIDTH] IN BLOOD BY AUTOMATED COUNT: 16.3 % (ref 11.6–15.1)
GFR SERPL CREATININE-BSD FRML MDRD: 60 ML/MIN/1.73SQ M
GLUCOSE SERPL-MCNC: 108 MG/DL (ref 65–140)
HCT VFR BLD AUTO: 47.1 % (ref 36.5–49.3)
HGB BLD-MCNC: 14.5 G/DL (ref 12–17)
MAGNESIUM SERPL-MCNC: 2.4 MG/DL (ref 1.9–2.7)
MCH RBC QN AUTO: 30.5 PG (ref 26.8–34.3)
MCHC RBC AUTO-ENTMCNC: 30.8 G/DL (ref 31.4–37.4)
MCV RBC AUTO: 99 FL (ref 82–98)
PHOSPHATE SERPL-MCNC: 3.6 MG/DL (ref 2.3–4.1)
PLATELET # BLD AUTO: 263 THOUSANDS/UL (ref 149–390)
PMV BLD AUTO: 10.8 FL (ref 8.9–12.7)
POTASSIUM SERPL-SCNC: 4.2 MMOL/L (ref 3.5–5.3)
RBC # BLD AUTO: 4.75 MILLION/UL (ref 3.88–5.62)
SODIUM SERPL-SCNC: 141 MMOL/L (ref 135–147)
WBC # BLD AUTO: 16.06 THOUSAND/UL (ref 4.31–10.16)

## 2023-10-24 PROCEDURE — 94669 MECHANICAL CHEST WALL OSCILL: CPT

## 2023-10-24 PROCEDURE — 85027 COMPLETE CBC AUTOMATED: CPT | Performed by: STUDENT IN AN ORGANIZED HEALTH CARE EDUCATION/TRAINING PROGRAM

## 2023-10-24 PROCEDURE — 99233 SBSQ HOSP IP/OBS HIGH 50: CPT | Performed by: INTERNAL MEDICINE

## 2023-10-24 PROCEDURE — 83735 ASSAY OF MAGNESIUM: CPT | Performed by: STUDENT IN AN ORGANIZED HEALTH CARE EDUCATION/TRAINING PROGRAM

## 2023-10-24 PROCEDURE — 80069 RENAL FUNCTION PANEL: CPT | Performed by: STUDENT IN AN ORGANIZED HEALTH CARE EDUCATION/TRAINING PROGRAM

## 2023-10-24 PROCEDURE — 99232 SBSQ HOSP IP/OBS MODERATE 35: CPT | Performed by: INTERNAL MEDICINE

## 2023-10-24 PROCEDURE — 94760 N-INVAS EAR/PLS OXIMETRY 1: CPT

## 2023-10-24 PROCEDURE — 94664 DEMO&/EVAL PT USE INHALER: CPT

## 2023-10-24 PROCEDURE — 94640 AIRWAY INHALATION TREATMENT: CPT

## 2023-10-24 RX ORDER — METOPROLOL SUCCINATE 25 MG/1
12.5 TABLET, EXTENDED RELEASE ORAL 2 TIMES DAILY
Status: DISCONTINUED | OUTPATIENT
Start: 2023-10-24 | End: 2023-10-27 | Stop reason: HOSPADM

## 2023-10-24 RX ORDER — BUDESONIDE 0.5 MG/2ML
INHALANT ORAL
Qty: 180 ML | Refills: 0 | Status: SHIPPED | OUTPATIENT
Start: 2023-10-24

## 2023-10-24 RX ADMIN — HEPARIN SODIUM 5000 UNITS: 5000 INJECTION INTRAVENOUS; SUBCUTANEOUS at 05:25

## 2023-10-24 RX ADMIN — BUDESONIDE 0.5 MG: 0.5 INHALANT ORAL at 19:55

## 2023-10-24 RX ADMIN — METOPROLOL SUCCINATE 12.5 MG: 25 TABLET, FILM COATED, EXTENDED RELEASE ORAL at 21:44

## 2023-10-24 RX ADMIN — FORMOTEROL FUMARATE DIHYDRATE 20 MCG: 20 SOLUTION RESPIRATORY (INHALATION) at 07:05

## 2023-10-24 RX ADMIN — LEVALBUTEROL HYDROCHLORIDE 1.25 MG: 1.25 SOLUTION RESPIRATORY (INHALATION) at 07:05

## 2023-10-24 RX ADMIN — GABAPENTIN 300 MG: 300 CAPSULE ORAL at 21:44

## 2023-10-24 RX ADMIN — BENZONATATE 100 MG: 100 CAPSULE ORAL at 21:44

## 2023-10-24 RX ADMIN — GUAIFENESIN 1200 MG: 600 TABLET ORAL at 09:40

## 2023-10-24 RX ADMIN — FLUTICASONE PROPIONATE 2 SPRAY: 50 SPRAY, METERED NASAL at 09:37

## 2023-10-24 RX ADMIN — GUAIFENESIN 1200 MG: 600 TABLET ORAL at 17:45

## 2023-10-24 RX ADMIN — HEPARIN SODIUM 5000 UNITS: 5000 INJECTION INTRAVENOUS; SUBCUTANEOUS at 21:44

## 2023-10-24 RX ADMIN — FORMOTEROL FUMARATE DIHYDRATE 20 MCG: 20 SOLUTION RESPIRATORY (INHALATION) at 20:16

## 2023-10-24 RX ADMIN — ASPIRIN 81 MG CHEWABLE TABLET 81 MG: 81 TABLET CHEWABLE at 21:44

## 2023-10-24 RX ADMIN — Medication 12.5 MG: at 09:47

## 2023-10-24 RX ADMIN — LEVALBUTEROL HYDROCHLORIDE 1.25 MG: 1.25 SOLUTION RESPIRATORY (INHALATION) at 13:19

## 2023-10-24 RX ADMIN — SODIUM CHLORIDE SOLN NEBU 3% 4 ML: 3 NEBU SOLN at 07:05

## 2023-10-24 RX ADMIN — PANTOPRAZOLE SODIUM 20 MG: 20 TABLET, DELAYED RELEASE ORAL at 05:25

## 2023-10-24 RX ADMIN — LEVALBUTEROL HYDROCHLORIDE 1.25 MG: 1.25 SOLUTION RESPIRATORY (INHALATION) at 19:55

## 2023-10-24 RX ADMIN — ATORVASTATIN CALCIUM 80 MG: 80 TABLET, FILM COATED ORAL at 17:45

## 2023-10-24 RX ADMIN — BENZONATATE 100 MG: 100 CAPSULE ORAL at 09:41

## 2023-10-24 RX ADMIN — BUDESONIDE 0.5 MG: 0.5 INHALANT ORAL at 07:05

## 2023-10-24 RX ADMIN — FAMOTIDINE 20 MG: 20 TABLET, FILM COATED ORAL at 21:44

## 2023-10-24 RX ADMIN — FUROSEMIDE 40 MG: 40 TABLET ORAL at 09:40

## 2023-10-24 RX ADMIN — PREDNISONE 40 MG: 20 TABLET ORAL at 09:41

## 2023-10-24 RX ADMIN — DIPHENHYDRAMINE HYDROCHLORIDE 50 MG: 25 TABLET ORAL at 21:52

## 2023-10-24 RX ADMIN — FLUTICASONE PROPIONATE 2 SPRAY: 50 SPRAY, METERED NASAL at 17:47

## 2023-10-24 RX ADMIN — Medication 1 APPLICATION: at 09:36

## 2023-10-24 RX ADMIN — CEFTRIAXONE 2000 MG: 2 INJECTION, SOLUTION INTRAVENOUS at 15:04

## 2023-10-24 RX ADMIN — BENZONATATE 100 MG: 100 CAPSULE ORAL at 17:45

## 2023-10-24 NOTE — ASSESSMENT & PLAN NOTE
Lab Results   Component Value Date    EGFR 60 10/24/2023    EGFR 59 10/23/2023    EGFR 60 10/22/2023    CREATININE 1.15 10/24/2023    CREATININE 1.16 10/23/2023    CREATININE 1.15 10/22/2023     -Continue to monitor renal function electrolytes with repletion to goal potassium 4.0, magnesium 2.0

## 2023-10-24 NOTE — RESPIRATORY THERAPY NOTE
10/23/23 2044   Respiratory Assessment   Resp Comments pt has h2o filled plugged into red outlet pt self applies no help needed will apply when ready   Non-Invasive Information   Non-Invasive Ventilation Mode CPAP

## 2023-10-24 NOTE — ASSESSMENT & PLAN NOTE
79-year-old male patient with COPD on 3 L/min of oxygen presents with increased productive cough, shortness of breath, wheezing and increased O2 requirements up to 4L NC    CXR in ER showing New bilateral lower lung pneumonia. Received ceftriaxone and and azithromycin on admission    Blood cultures from admission 1 set positive for gram-negative rods and gram-positive cocci. We will continue ceftriaxone. Follow-up final cultures and sensitivities. Infectious disease input appreciated. Follow-up repeat blood cultures.   Supportive care

## 2023-10-24 NOTE — TELEPHONE ENCOUNTER
----- Message from Jenna Woods MD sent at 10/23/2023  4:20 PM EDT -----  Regarding: hospital follow-up  Hello please set patient up with either Dr Russ Moy or [address] [place] [address] in around two weeks at [facility]

## 2023-10-24 NOTE — PROGRESS NOTES
Progress Note - Infectious Disease   Yelena Patiño 77 y.o. male MRN: [id number]  Unit/Bed#: -01 Encounter: [id number]      Impression/Plan:  1. Polymicrobial bacteremia. It only 1 of 3 sets with 2 different organisms isolated. Suspect this could represent a contaminant with multiple organisms in 1 of 3 sets and currently growth. Less likely but possible would be transient bacteremia associated with pneumonia. Patient remained stable without any clinical sepsis. Rising white count may be all related to the patient's steroid use. -Continue ceftriaxone for now awaiting additional data  -Follow-up repeat blood cultures  -No additional work-up for now awaiting additional data  -Check CBC with differential and CMP     2. Possible community-acquired pneumonia. With equivocal findings noted on CT of the chest, and with a repeatedly normal procalcitonin level.  -Antibiotics as above for now  -Follow-up blood culture  -Monitor respiratory status     3. Acute on chronic respiratory failure. All in the setting of COPD exacerbation.  -Monitor respiratory status  -Oxygen support  -Antibiotics as above for now  -Steroids and breathing treatments  -Close pulmonary follow-up     4. COPD. O2 dependent with a possible acute exacerbation. Her status improved with steroids and breathing treatments and antibiotics     5. Acute on chronic combined systolic and diastolic congestive heart failure    Discussed the above management plan with the primary service    Antibiotics:  Ceftriaxone restart 2    Subjective:  Patient has no fever, chills, sweats; no nausea, vomiting, diarrhea; no cough, shortness of breath; no pain. No new symptoms.     Objective:  Vitals:  Temp:  [96.7 °F (35.9 °C)-97.9 °F (36.6 °C)] 97.9 °F (36.6 °C)  HR:  [] 97  Resp:  [18-21] 21  BP: (107-113)/(64-73) 109/73  SpO2:  [91 %-99 %] 93 %  Temp (24hrs), Av.5 °F (36.4 °C), Min:96.7 °F (35.9 °C), Max:97.9 °F (36.6 °C)  Current: Temperature: 97.9 °F (36.6 °C)    Physical Exam:   General Appearance:  Alert, interactive, nontoxic, no acute distress. Throat: Oropharynx moist without lesions. Lungs:   Decreased breath sounds bilaterally; no wheezes, rhonchi or rales; respirations unlabored   Heart:  RRR; no murmur, rub or gallop   Abdomen:   Soft, non-tender, non-distended, positive bowel sounds. Extremities: No clubbing, cyanosis or edema   Skin: No new rashes or lesions. No draining wounds noted. Labs, Imaging, & Other studies:   All pertinent labs and imaging studies were personally reviewed  Results from last 7 days   Lab Units 10/24/23  0520 10/23/23  0452 10/22/23  0542   WBC Thousand/uL 16.06* 15.41* 11.36*   HEMOGLOBIN g/dL 14.5 12.9 13.5   PLATELETS Thousands/uL 263 219 207     Results from last 7 days   Lab Units 10/24/23  0520 10/23/23  0452 10/22/23  0542 10/19/23  0513 10/18/23  1210   SODIUM mmol/L 141 141 141   < > 143   POTASSIUM mmol/L 4.2 4.2 4.1   < > 3.1*   CHLORIDE mmol/L 98 101 100   < > 105   CO2 mmol/L 36* 34* 33*   < > 31   BUN mg/dL 33* 32* 29*   < > 13   CREATININE mg/dL 1.15 1.16 1.15   < > 1.02   EGFR ml/min/1.73sq m 60 59 60   < > 69   CALCIUM mg/dL 8.6 8.4 8.7   < > 8.6   AST U/L  --   --   --   --  22   ALT U/L  --   --   --   --  20   ALK PHOS U/L  --   --   --   --  53    < > = values in this interval not displayed. Results from last 7 days   Lab Units 10/23/23  1736 10/18/23  1751 10/18/23  1714 10/18/23  1324 10/18/23  1323   BLOOD CULTURE  Received in Microbiology Lab. Culture in Progress. Received in Microbiology Lab. Culture in Progress. --   --  Gram Negative Shane* No Growth After 5 Days. SPUTUM CULTURE   --  4+ Growth of  Commensal respiratory robi only; No significant growth of Staph aureus/MRSA or Pseudomonas aeruginosa.   --   --   --    GRAM STAIN RESULT   --  1+ Epithelial cells per low power field*  1+ Polys*  1+ Gram positive cocci in clusters*  --  Gram negative rods*  Gram positive cocci in chains and clusters*  --    LEGIONELLA URINARY ANTIGEN   --   --  Negative  --   --      Results from last 7 days   Lab Units 10/20/23  0641 10/19/23  0513 10/18/23  1210   PROCALCITONIN ng/ml 0.09 0.09 0.10

## 2023-10-24 NOTE — ASSESSMENT & PLAN NOTE
Wt Readings from Last 3 Encounters:   10/24/23 79.9 kg (176 lb 4.1 oz)   10/02/23 82.9 kg (182 lb 12.8 oz)   09/29/23 82.1 kg (181 lb)     -We will transition patient to metoprolol succinate 12.5 mg twice daily and continue oral furosemide 40 mg daily  -If patient's mean arterial pressure appears stable consistently on regimen may attempt reinitiation of low-dose spironolactone however this and Entresto had to be discontinued in the past due to hypotension  -Continue to monitor patient clinically at this time  -Patient counseled on sodium and fluid restricted diet along with monitoring standing daily weights.  -Continue to monitor renal function electrolytes with repletion to goal potassium 4.0, magnesium 2.0.

## 2023-10-24 NOTE — PROGRESS NOTES
[address]  Progress Note  Name: Olga Enciso  MRN: [id number]  Unit/Bed#: -01 I Date of Admission: 10/18/2023   Date of Service: 10/24/2023 I Hospital Day: 5    Assessment/Plan   Pneumonia of right lower lobe due to infectious organism  Assessment & Plan  69-year-old male patient with COPD on 3 L/min of oxygen presents with increased productive cough, shortness of breath, wheezing and increased O2 requirements up to 4L NC    CXR in ER showing New bilateral lower lung pneumonia. Received ceftriaxone and and azithromycin on admission    Blood cultures from admission 1 set positive for gram-negative rods and gram-positive cocci. We will continue ceftriaxone. Follow-up final cultures and sensitivities. Infectious disease input appreciated. Follow-up repeat blood cultures. Supportive care      * COPD with acute exacerbation [facility])  Assessment & Plan  Presents with increased work of breathing wheezing and coughing  Suspect patient is in COPD exacerbation secondary to pneumonia  Received Solu-Medrol 125 mg in the ER and DuoNeb treatment  Patient improved with IV steroids.   Now on oral prednisone, will continue to taper  Finished 3 days of Azithromycin  Ipratropium and Xopenex nebulizers 3 times daily  Continue nebulizers    Pulmonology input appreciated    Acute systolic congestive heart failure (HCC)  Assessment & Plan  Wt Readings from Last 3 Encounters:   10/24/23 79.9 kg (176 lb 4.1 oz)   10/02/23 82.9 kg (182 lb 12.8 oz)   09/29/23 82.1 kg (181 lb)   Patient has been transitioned to oral Lasix  Cardiology input appreciated  Entresto discontinued due to low blood pressures  Continue current cardiac medications  Monitor PENG's Daily weight    Chronic kidney disease, stage 3a [facility])  Assessment & Plan  Lab Results   Component Value Date    EGFR 60 10/24/2023    EGFR 59 10/23/2023    EGFR 60 10/22/2023    CREATININE 1.15 10/24/2023    CREATININE 1.16 10/23/2023    CREATININE 1.15 10/22/2023     Creatinine appears to be around baseline. We will continue routine lab monitoring    Chronic respiratory failure with hypoxia (HCC)  Assessment & Plan  Chronic respiratory failure with hypoxia due to chronic COPD a/e/b need for continuous O2, normally at 3 LPM, on 4 LPM when admitted  Back to baseline O2 requirements    LAMBERTO on CPAP  Assessment & Plan  Continue cpap qhs    Hypertension  Assessment & Plan  Continue home Toprol-XL    Coronary artery disease involving native coronary artery of native heart without angina pectoris  Assessment & Plan  Continue home aspirin 81 mg daily, Toprol 12.5 mg daily, and resume home Crestor 40 mg daily           VTE Prophylaxis:  Heparin    Patient Centered Rounds: I have performed bedside rounds with nursing staff today. Discussions with Specialists or Other Care Team Provider: yes  Education and Discussions with Family / Patient: Updated patient regarding plan of care    Current Length of Stay: 5 day(s)    Current Patient Status: Inpatient   Certification Statement: The patient will continue to require additional inpatient hospital stay due to pneumonia, bacteremia    Discharge Plan: Hopeful discharge home once medically stable    Code Status: Level 2 - DNAR: but accepts endotracheal intubation    Subjective:   No overnight events noted. Patient currently on antibiotics for positive blood culture. Patient also requiring 3 to 4 L nasal cannula oxygen    Objective:     Vitals:   Temp (24hrs), Av.5 °F (36.4 °C), Min:96.7 °F (35.9 °C), Max:97.9 °F (36.6 °C)    Temp:  [96.7 °F (35.9 °C)-97.9 °F (36.6 °C)] 97.9 °F (36.6 °C)  HR:  [] 97  Resp:  [18-21] 21  BP: (100-113)/(52-73) 100/52  SpO2:  [91 %-99 %] 95 %  Body mass index is 26.8 kg/m². Input and Output Summary (last 24 hours):        Intake/Output Summary (Last 24 hours) at 10/24/2023 1304  Last data filed at 10/24/2023 1100  Gross per 24 hour   Intake 300 ml   Output 75 ml   Net 225 ml Physical Exam:   Physical Exam  Constitutional:       General: He is not in acute distress. HENT:      Head: Normocephalic and atraumatic. Ears:      Comments: Hard of hearing     Nose: Nose normal.      Mouth/Throat:      Mouth: Mucous membranes are moist.   Eyes:      Extraocular Movements: Extraocular movements intact. Conjunctiva/sclera: Conjunctivae normal.   Cardiovascular:      Rate and Rhythm: Normal rate and regular rhythm. Pulmonary:      Effort: Pulmonary effort is normal. No respiratory distress. Breath sounds: Rhonchi present. Abdominal:      Palpations: Abdomen is soft. Tenderness: There is no abdominal tenderness. Musculoskeletal:         General: Normal range of motion. Cervical back: Normal range of motion and neck supple. Comments: Generalized weakness   Skin:     General: Skin is warm and dry. Neurological:      General: No focal deficit present. Mental Status: He is alert. Mental status is at baseline. Cranial Nerves: No cranial nerve deficit. Psychiatric:         Mood and Affect: Mood normal.         Behavior: Behavior normal.         Additional Data:     Labs:    Results from last 7 days   Lab Units 10/24/23  0520 10/19/23  0601 10/18/23  1210   WBC Thousand/uL 16.06*   < > 6.77   HEMOGLOBIN g/dL 14.5   < > 13.6   HEMATOCRIT % 47.1   < > 44.0   PLATELETS Thousands/uL 263   < > 140*   NEUTROS PCT %  --   --  75   LYMPHS PCT %  --   --  13*   MONOS PCT %  --   --  8   EOS PCT %  --   --  4    < > = values in this interval not displayed.      Results from last 7 days   Lab Units 10/24/23  0520 10/19/23  0513 10/18/23  1210   SODIUM mmol/L 141   < > 143   POTASSIUM mmol/L 4.2   < > 3.1*   CHLORIDE mmol/L 98   < > 105   CO2 mmol/L 36*   < > 31   BUN mg/dL 33*   < > 13   CREATININE mg/dL 1.15   < > 1.02   CALCIUM mg/dL 8.6   < > 8.6   ALK PHOS U/L  --   --  53   ALT U/L  --   --  20   AST U/L  --   --  22    < > = values in this interval not displayed. * I Have Reviewed All Lab Data Listed Above. * Additional Pertinent Lab Tests Reviewed: [address] Admission  Reviewed    Imaging:  Imaging Reports Reviewed Today Include: No new imaging    Recent Cultures (last 7 days):     Results from last 7 days   Lab Units 10/23/23  1736 10/18/23  1751 10/18/23  1714 10/18/23  1324 10/18/23  1323   BLOOD CULTURE  Received in Microbiology Lab. Culture in Progress. Received in Microbiology Lab. Culture in Progress. --   --  Gram Negative Shane* No Growth After 5 Days. SPUTUM CULTURE   --  4+ Growth of  Commensal respiratory robi only; No significant growth of Staph aureus/MRSA or Pseudomonas aeruginosa.   --   --   --    GRAM STAIN RESULT   --  1+ Epithelial cells per low power field*  1+ Polys*  1+ Gram positive cocci in clusters*  --  Gram negative rods*  Gram positive cocci in chains and clusters*  --    LEGIONELLA URINARY ANTIGEN   --   --  Negative  --   --        Last 24 Hours Medication List:   Current Facility-Administered Medications   Medication Dose Route Frequency Provider Last Rate    acetaminophen  650 mg Oral Q6H PRN Pandi Todhe, DO      albuterol  2 puff Inhalation Q4H PRN Pandi Todhe, DO      albuterol  2.5 mg Nebulization Q6H PRN Pandi Todhe, DO      aspirin  81 mg Oral HS Pandi Todhe, DO      atorvastatin  80 mg Oral Daily With Dominguez Oil, DO      benzonatate  100 mg Oral TID Pandi Todhe, DO      budesonide  0.5 mg Nebulization Q12H Pandi Todhe, DO      cefTRIAXone  2,000 mg Intravenous Q24H Aaron Prakash MD 2,000 mg (10/23/23 1625)    cyanocobalamin  1,000 mcg Intramuscular Q30 Days Pandi Todhe, DO      diphenhydrAMINE  50 mg Oral HS PRN Donnie Muhammad PA-C      famotidine  20 mg Oral HS Pandi Todhe, DO      fluticasone  2 spray Each Nare BID Pandi Todhe, DO      formoterol  20 mcg Nebulization Q12H Pandi Todhe, DO      furosemide  40 mg Oral Daily Pandi Todhe, DO gabapentin  300 mg Oral HS Tesha Avila, DO      guaiFENesin  1,200 mg Oral BID Tesha Avila, DO      heparin (porcine)  5,000 Units Subcutaneous Q8H [address] Tesha Avila DO      levalbuterol  1.25 mg Nebulization TID Tesha Avila DO      metoprolol succinate  12.5 mg Oral BID Mariola Forts, DO      pantoprazole  20 mg Oral Early Morning Tesha Avila DO      predniSONE  40 mg Oral Daily Levis Seip, MD      sodium chloride  1 Application Nasal N2L PRN Levis Seip, MD      sodium chloride  4 mL Nebulization BID Tesha Avila DO          Today, Patient Was Seen By: Kelley Agarwal MD    ** Please Note: Dictation voice to text software may have been used in the creation of this document.  **

## 2023-10-24 NOTE — PLAN OF CARE
Problem: MOBILITY - ADULT  Goal: Maintain or return to baseline ADL function  Description: INTERVENTIONS:  -  Assess patient's ability to carry out ADLs; assess patient's baseline for ADL function and identify physical deficits which impact ability to perform ADLs (bathing, care of mouth/teeth, toileting, grooming, dressing, etc.)  - Assess/evaluate cause of self-care deficits   - Assess range of motion  - Assess patient's mobility; develop plan if impaired  - Assess patient's need for assistive devices and provide as appropriate  - Encourage maximum independence but intervene and supervise when necessary  - Involve family in performance of ADLs  - Assess for home care needs following discharge   - Consider OT consult to assist with ADL evaluation and planning for discharge  - Provide patient education as appropriate  Outcome: Progressing     Problem: PAIN - ADULT  Goal: Verbalizes/displays adequate comfort level or baseline comfort level  Description: Interventions:  - Encourage patient to monitor pain and request assistance  - Assess pain using appropriate pain scale  - Administer analgesics based on type and severity of pain and evaluate response  - Implement non-pharmacological measures as appropriate and evaluate response  - Consider cultural and social influences on pain and pain management  - Notify physician/advanced practitioner if interventions unsuccessful or patient reports new pain  Outcome: Progressing     Problem: INFECTION - ADULT  Goal: Absence or prevention of progression during hospitalization  Description: INTERVENTIONS:  - Assess and monitor for signs and symptoms of infection  - Monitor lab/diagnostic results  - Monitor all insertion sites, i.e. indwelling lines, tubes, and drains  - Monitor endotracheal if appropriate and nasal secretions for changes in amount and color  - [place] appropriate cooling/warming therapies per order  - Administer medications as ordered  - Instruct and encourage patient and family to use good hand hygiene technique  - Identify and instruct in appropriate isolation precautions for identified infection/condition  Outcome: Progressing     Problem: SAFETY ADULT  Goal: Maintain or return to baseline ADL function  Description: INTERVENTIONS:  -  Assess patient's ability to carry out ADLs; assess patient's baseline for ADL function and identify physical deficits which impact ability to perform ADLs (bathing, care of mouth/teeth, toileting, grooming, dressing, etc.)  - Assess/evaluate cause of self-care deficits   - Assess range of motion  - Assess patient's mobility; develop plan if impaired  - Assess patient's need for assistive devices and provide as appropriate  - Encourage maximum independence but intervene and supervise when necessary  - Involve family in performance of ADLs  - Assess for home care needs following discharge   - Consider OT consult to assist with ADL evaluation and planning for discharge  - Provide patient education as appropriate  Outcome: Progressing     Problem: Prexisting or High Potential for Compromised Skin Integrity  Goal: Skin integrity is maintained or improved  Description: INTERVENTIONS:  - Identify patients at risk for skin breakdown  - Assess and monitor skin integrity  - Assess and monitor nutrition and hydration status  - Monitor labs   - Assess for incontinence   - Turn and reposition patient  - Assist with mobility/ambulation  - Relieve pressure over bony prominences  - Avoid friction and shearing  - Provide appropriate hygiene as needed including keeping skin clean and dry  - Evaluate need for skin moisturizer/barrier cream  - Collaborate with interdisciplinary team   - Patient/family teaching  - Consider wound care consult   Outcome: Progressing

## 2023-10-24 NOTE — CASE MANAGEMENT
Case Management Discharge Planning Note    Patient name Venkat Bryant  Location /-01 MRN [id number]  : 1945 Date 10/24/2023       Current Admission Date: 10/18/2023  Current Admission Diagnosis:COPD with acute exacerbation [facility])   Patient Active Problem List    Diagnosis Date Noted    Acute systolic congestive heart failure ([address]) 10/22/2023    Erectile dysfunction 2023    Other disorders of refraction 2023    Occlusion and stenosis of unspecified carotid artery 2023    Dysphagia 2023    Congestive heart failure with left ventricular systolic dysfunction (LVSD) ([address]) 2023    Hypoxemia 2023    Intracranial aneurysm 2023    Allergic rhinitis, unspecified 2023    Chronic kidney disease, stage 3a ([address]) 2023    Generalized muscle weakness 2023    Hypertensive heart and chronic kidney disease with heart failure and stage 1 through stage 4 chronic kidney disease, or unspecified chronic kidney disease ([address]) 2023    Need for assistance with personal care 2023    Other abnormalities of gait and mobility 2023    Retention of urine, unspecified 2023    Sudden idiopathic hearing loss, right ear 2023    Acute and chronic respiratory failure with hypoxia ([address]) 2023    Elevated troponin 2023    Orthopnea 2023    Lower extremity edema 2023    Irregular heart rhythm 2023    Pneumonia of right lower lobe due to infectious organism 03/15/2023    Leukocytosis 2023    Left leg pain 2023    Transient right leg weakness 2023    Hypokalemia 2023    Ambulatory dysfunction 2023    COPD (chronic obstructive pulmonary disease) ([address]) 2023    Sensorineural hearing loss, bilateral 10/19/2022    Chronic respiratory failure with hypoxia ([address]) 10/15/2022    Prostate cancer ([address]) 2022    Elevated MCV 2022    Cubital tunnel syndrome on left 2022    Carpal tunnel syndrome on left 07/22/2022    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    Urinary frequency 04/27/2022    Incomplete bladder emptying 04/27/2022    Chronic bilateral low back pain with right-sided sciatica 04/18/2022    Mid back pain 04/18/2022    Thoracic radiculopathy 04/18/2022    Chronic pain syndrome 04/18/2022    Hoarseness or changing voice 04/14/2022    Chronic right-sided thoracic back pain 03/05/2022    Primary insomnia 03/02/2022    Right hip pain 01/20/2022    Abnormal nuclear stress test 03/18/2021    Costochondral chest pain 01/15/2021    COPD with acute exacerbation ([address]) 01/11/2021    Oxygen dependent 01/11/2021    S/P carotid endarterectomy 01/11/2021    Stented coronary artery 01/11/2021    Vertigo 01/11/2021    Anisometropia 01/11/2021    Osteoarthritis of spinal facet joint 01/11/2021    Chronic ischemic heart disease 01/11/2021    Diverticular disease of colon 01/11/2021    Dry eye syndrome 01/11/2021    GERD (gastroesophageal reflux disease) 01/11/2021    Acute hearing loss, right 01/11/2021    Elevated PSA 01/11/2021    Hypoxia 01/11/2021    Lens replaced by other means 01/11/2021    Lumbar radiculopathy 01/11/2021    Multinodular goiter 01/11/2021    Nuclear senile cataract 01/11/2021    Obesity 01/11/2021    Occlusion and stenosis of carotid artery 01/11/2021    Osteoarthritis of hip 01/11/2021    Prediabetes 01/11/2021    LAMBERTO on CPAP 01/11/2021    Solitary pulmonary nodule 01/11/2021    Thyroid nodule 01/11/2021    Guyon syndrome, 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  [address] [facility] Hal FANG [id number]  Phone: [phone] Fax: [phone] [address] [facility], [address] [facility]  [address] [place] [address]  Phone: [phone] Fax: [phone]    Primary Care Provider: Haroon Yang DO    Primary Insurance: [facility]  Secondary Insurance:     DISCHARGE DETAILS:    Discharge planning discussed with[de-identified] Patient  Freedom of Choice: Yes           Transport at Discharge : Family          IMM Given (Date):: 10/24/23  IMM Given to[de-identified] Patient     Additional Comments: Anticipate discharge in the next 24 hours per SLIM. Pt will return home with family and resume OP Pumonary rehab.

## 2023-10-24 NOTE — ASSESSMENT & PLAN NOTE
Wt Readings from Last 3 Encounters:   10/24/23 79.9 kg (176 lb 4.1 oz)   10/02/23 82.9 kg (182 lb 12.8 oz)   09/29/23 82.1 kg (181 lb)   Patient has been transitioned to oral Lasix  Cardiology input appreciated  Entresto discontinued due to low blood pressures  Continue current cardiac medications  Monitor PENG's Daily weight

## 2023-10-24 NOTE — PROGRESS NOTES
[address]  Progress Note  Name: Andie Jarrell  MRN: [id number]  Unit/Bed#: -01 I Date of Admission: 10/18/2023   Date of Service: 10/24/2023 I Hospital Day: 5    Assessment/Plan   Acute systolic congestive heart failure [facility])  Assessment & Plan  Wt Readings from Last 3 Encounters:   10/24/23 79.9 kg (176 lb 4.1 oz)   10/02/23 82.9 kg (182 lb 12.8 oz)   09/29/23 82.1 kg (181 lb)     -We will transition patient to metoprolol succinate 12.5 mg twice daily and continue oral furosemide 40 mg daily  -If patient's mean arterial pressure appears stable consistently on regimen may attempt reinitiation of low-dose spironolactone however this and Entresto had to be discontinued in the past due to hypotension  -Continue to monitor patient clinically at this time  -Patient counseled on sodium and fluid restricted diet along with monitoring standing daily weights.  -Continue to monitor renal function electrolytes with repletion to goal potassium 4.0, magnesium 2.0. Chronic kidney disease, stage 3a [facility])  Assessment & Plan  Lab Results   Component Value Date    EGFR 60 10/24/2023    EGFR 59 10/23/2023    EGFR 60 10/22/2023    CREATININE 1.15 10/24/2023    CREATININE 1.16 10/23/2023    CREATININE 1.15 10/22/2023     -Continue to monitor renal function electrolytes with repletion to goal potassium 4.0, magnesium 2.0    Chronic respiratory failure with hypoxia (HCC)  Assessment & Plan  -Maintained on nasal cannula oxygen at home  -Continue plan of care per pulmonology and medical team        Hypertension  Assessment & Plan  -Now with issues with hypotension will attempt up titration of medical therapy as patient tolerates    * COPD with acute exacerbation [facility])  Assessment & Plan  -Plan of care per primary team and pulmonology        Subjective:   Patient seen and examined.   Per patient denies any chest pain, palpitations, lightness dizziness, loss of consciousness and notes good urine output on oral diuretic regimen along with slight improvement in shortness of breath however notes still not back to baseline. Summary comments:  -Transition patient to metoprolol succinate  -Continue current oral diuretic regimen and if mean arterial pressure stable will likely attempt to reintroduce low-dose spironolactone  -Continue to monitor patient clinically at this time        Vitals: Blood pressure 100/52, pulse 97, temperature 97.9 °F (36.6 °C), temperature source Temporal, resp. rate 21, height 5' 8" (1.727 m), weight 79.9 kg (176 lb 4.1 oz), SpO2 95 %.,   Orthostatic Blood Pressures      Flowsheet Row Most Recent Value   Blood Pressure 100/52 filed at 10/24/2023 0943   Patient Position - Orthostatic VS Lying filed at 10/24/2023 0706        ,   Weight (last 2 days)       Date/Time Weight    10/24/23 0600 79.9 (176.26)    10/23/23 0600 80.6 (177.69)    10/22/23 1100 81.1 (178.68)    10/22/23 0952 81.1 (178.68)    10/22/23 0542 81.4 (179.56)     Weight: GREY standing scale at 10/22/23 0542            Physical Exam:  Physical Exam  Vitals reviewed. Constitutional:       General: He is not in acute distress. Appearance: Normal appearance. He is not diaphoretic. HENT:      Head: Normocephalic and atraumatic. Comments: Nasal cannula oxygen in place  Eyes:      General:         Right eye: No discharge. Left eye: No discharge. Neck:      Comments: Trachea midline, mild JVD present  Cardiovascular:      Rate and Rhythm: Normal rate. Heart sounds: No friction rub. Pulmonary:      Effort: No respiratory distress. Breath sounds: Wheezing present. Comments: Decreased breath sounds bilaterally  Chest:      Chest wall: No tenderness. Abdominal:      General: Bowel sounds are normal.      Palpations: Abdomen is soft. Tenderness: There is no abdominal tenderness. There is no rebound. Musculoskeletal:         General: No signs of injury.       Right lower leg: No edema. Left lower leg: No edema. Skin:     General: Skin is warm and dry. Neurological:      Mental Status: He is alert.       Comments: Awake, alert, able to answer questions appropriately, hard of hearing   Psychiatric:         Mood and Affect: Mood normal.         Behavior: Behavior normal.          Medications:      Current Facility-Administered Medications:     acetaminophen (TYLENOL) tablet 650 mg, 650 mg, Oral, Q6H PRN, Pandi Todhe, DO    albuterol (PROVENTIL HFA,VENTOLIN HFA) inhaler 2 puff, 2 puff, Inhalation, Q4H PRN, Pandi Todhe, DO    albuterol inhalation solution 2.5 mg, 2.5 mg, Nebulization, Q6H PRN, Pandi Todhe, DO    aspirin chewable tablet 81 mg, 81 mg, Oral, HS, Pandi Todhe, DO, 81 mg at 10/23/23 2117    atorvastatin (LIPITOR) tablet 80 mg, 80 mg, Oral, Daily With Dinner, Briseydai Todhe, DO, 80 mg at 10/23/23 1623    benzonatate (TESSALON PERLES) capsule 100 mg, 100 mg, Oral, TID, Pandi Todhe, DO, 100 mg at 10/24/23 0941    budesonide (PULMICORT) inhalation solution 0.5 mg, 0.5 mg, Nebulization, Q12H, Pandi Todhe, DO, 0.5 mg at 10/24/23 0705    cefTRIAXone (ROCEPHIN) IVPB (premix in dextrose) 2,000 mg 50 mL, 2,000 mg, Intravenous, Q24H, Urbano Vazquez MD, Last Rate: 100 mL/hr at 10/23/23 1625, 2,000 mg at 10/23/23 1625    cyanocobalamin injection 1,000 mcg, 1,000 mcg, Intramuscular, Q30 Days, Briseydai Todhe, DO, 1,000 mcg at 10/19/23 5705    diphenhydrAMINE (BENADRYL) tablet 50 mg, 50 mg, Oral, HS PRN, Leanne Foil, PA-C, 50 mg at 10/22/23 2318    famotidine (PEPCID) tablet 20 mg, 20 mg, Oral, HS, Briseydai Todhe, DO, 20 mg at 10/23/23 2117    fluticasone (FLONASE) 50 mcg/act nasal spray 2 spray, 2 spray, Each Nare, BID, Tesha Avila DO, 2 spray at 10/24/23 0937    formoterol (PERFOROMIST) nebulizer solution 20 mcg, 20 mcg, Nebulization, Q12H, Tesha Avila DO, 20 mcg at 10/24/23 0705    furosemide (LASIX) tablet 40 mg, 40 mg, Oral, Daily, Tesha Avila DO, 40 mg at 10/24/23 0940 gabapentin (NEURONTIN) capsule 300 mg, 300 mg, Oral, HS, Pandi Todhe, DO, 300 mg at 10/23/23 2117    guaiFENesin (MUCINEX) 12 hr tablet 1,200 mg, 1,200 mg, Oral, BID, Pandi Todhe, DO, 1,200 mg at 10/24/23 0940    heparin (porcine) subcutaneous injection 5,000 Units, 5,000 Units, Subcutaneous, Q8H [facility] & half-way, 5,000 Units at 10/24/23 0525 **AND** [CANCELED] Platelet count, , , Once, Pandi Todhe, DO    levalbuterol (XOPENEX) inhalation solution 1.25 mg, 1.25 mg, Nebulization, TID, 1.25 mg at 10/24/23 0705 **AND** [DISCONTINUED] sodium chloride 0.9 % inhalation solution 3 mL, 3 mL, Nebulization, TID, Pandi Todhe, DO    metoprolol succinate (TOPROL-XL) 24 hr tablet 12.5 mg, 12.5 mg, Oral, BID, Dub Lizzeth, DO    pantoprazole (PROTONIX) EC tablet 20 mg, 20 mg, Oral, Early Morning, Pandi Todhe, DO, 20 mg at 10/24/23 0525    predniSONE tablet 40 mg, 40 mg, Oral, Daily, Katherine Morris MD, 40 mg at 10/24/23 0941    sodium chloride (AYR SALINE NASAL) nasal gel 1 Application, 1 Application, Nasal, S8Q PRN, Bobby Sepulveda MD, 1 Application at 88/15/44 0936    sodium chloride 3 % inhalation solution 4 mL, 4 mL, Nebulization, BID, Pandi Todhe, DO, 4 mL at 10/24/23 0705     Labs & Results:    Troponins:    Results from last 7 days   Lab Units 10/18/23  1210   HS TNI 0HR ng/L 26        BNP:   Results from last 6 Months   Lab Units 10/21/23  1639 10/18/23  1210   BNP pg/mL 1,892* 774*     CBC with diff:   Results from last 7 days   Lab Units 10/24/23  0520 10/23/23  0452   WBC Thousand/uL 16.06* 15.41*   HEMOGLOBIN g/dL 14.5 12.9   HEMATOCRIT % 47.1 42.1   MCV fL 99* 98   PLATELETS Thousands/uL 263 219     TSH:     CMP:   Results from last 7 days   Lab Units 10/24/23  0520 10/23/23  0452 10/19/23  0513 10/18/23  1210   POTASSIUM mmol/L 4.2 4.2   < > 3.1*   CHLORIDE mmol/L 98 101   < > 105   CO2 mmol/L 36* 34*   < > 31   BUN mg/dL 33* 32*   < > 13   CREATININE mg/dL 1.15 1.16   < > 1.02   AST U/L  --   --   --  22   ALT U/L --   --   --  20   EGFR ml/min/1.73sq m 60 59   < > 69    < > = values in this interval not displayed.      Lipid Profile:     Coags:

## 2023-10-24 NOTE — ASSESSMENT & PLAN NOTE
Lab Results   Component Value Date    EGFR 60 10/24/2023    EGFR 59 10/23/2023    EGFR 60 10/22/2023    CREATININE 1.15 10/24/2023    CREATININE 1.16 10/23/2023    CREATININE 1.15 10/22/2023     Creatinine appears to be around baseline.   We will continue routine lab monitoring

## 2023-10-24 NOTE — PLAN OF CARE
Problem: MOBILITY - ADULT  Goal: Maintain or return to baseline ADL function  Description: INTERVENTIONS:  -  Assess patient's ability to carry out ADLs; assess patient's baseline for ADL function and identify physical deficits which impact ability to perform ADLs (bathing, care of mouth/teeth, toileting, grooming, dressing, etc.)  - Assess/evaluate cause of self-care deficits   - Assess range of motion  - Assess patient's mobility; develop plan if impaired  - Assess patient's need for assistive devices and provide as appropriate  - Encourage maximum independence but intervene and supervise when necessary  - Involve family in performance of ADLs  - Assess for home care needs following discharge   - Consider OT consult to assist with ADL evaluation and planning for discharge  - Provide patient education as appropriate  Outcome: Progressing  Goal: Maintains/Returns to pre admission functional level  Description: INTERVENTIONS:  - Perform BMAT or MOVE assessment daily.   - Set and communicate daily mobility goal to care team and patient/family/caregiver. - Collaborate with rehabilitation services on mobility goals if consulted  - Perform Range of Motion 3 times a day. - Reposition patient every 2 hours.   - Dangle patient 3 times a day  - Stand patient 3 times a day  - Ambulate patient 3 times a day  - Out of bed to chair 3 times a day   - Out of bed for meals 3 times a day  - Out of bed for toileting  - Record patient progress and toleration of activity level   Outcome: Progressing     Problem: PAIN - ADULT  Goal: Verbalizes/displays adequate comfort level or baseline comfort level  Description: Interventions:  - Encourage patient to monitor pain and request assistance  - Assess pain using appropriate pain scale  - Administer analgesics based on type and severity of pain and evaluate response  - Implement non-pharmacological measures as appropriate and evaluate response  - Consider cultural and social influences on pain and pain management  - Notify physician/advanced practitioner if interventions unsuccessful or patient reports new pain  Outcome: Progressing     Problem: INFECTION - ADULT  Goal: Absence or prevention of progression during hospitalization  Description: INTERVENTIONS:  - Assess and monitor for signs and symptoms of infection  - Monitor lab/diagnostic results  - Monitor all insertion sites, i.e. indwelling lines, tubes, and drains  - Monitor endotracheal if appropriate and nasal secretions for changes in amount and color  - [place] appropriate cooling/warming therapies per order  - Administer medications as ordered  - Instruct and encourage patient and family to use good hand hygiene technique  - Identify and instruct in appropriate isolation precautions for identified infection/condition  Outcome: Progressing  Goal: Absence of fever/infection during neutropenic period  Description: INTERVENTIONS:  - Monitor WBC    Outcome: Progressing     Problem: SAFETY ADULT  Goal: Maintain or return to baseline ADL function  Description: INTERVENTIONS:  -  Assess patient's ability to carry out ADLs; assess patient's baseline for ADL function and identify physical deficits which impact ability to perform ADLs (bathing, care of mouth/teeth, toileting, grooming, dressing, etc.)  - Assess/evaluate cause of self-care deficits   - Assess range of motion  - Assess patient's mobility; develop plan if impaired  - Assess patient's need for assistive devices and provide as appropriate  - Encourage maximum independence but intervene and supervise when necessary  - Involve family in performance of ADLs  - Assess for home care needs following discharge   - Consider OT consult to assist with ADL evaluation and planning for discharge  - Provide patient education as appropriate  Outcome: Progressing  Goal: Maintains/Returns to pre admission functional level  Description: INTERVENTIONS:  - Perform BMAT or MOVE assessment daily.   - Set and communicate daily mobility goal to care team and patient/family/caregiver. - Collaborate with rehabilitation services on mobility goals if consulted  - Perform Range of Motion 3 times a day. - Reposition patient every 2 hours.   - Dangle patient 3 times a day  - Stand patient 3 times a day  - Ambulate patient 3 times a day  - Out of bed to chair 3 times a day   - Out of bed for meals 3 times a day  - Out of bed for toileting  - Record patient progress and toleration of activity level   Outcome: Progressing  Goal: Patient will remain free of falls  Description: INTERVENTIONS:  - Educate patient/family on patient safety including physical limitations  - Instruct patient to call for assistance with activity   - Consult OT/PT to assist with strengthening/mobility   - Keep Call bell within reach  - Keep bed low and locked with side rails adjusted as appropriate  - Keep care items and personal belongings within reach  - Initiate and maintain comfort rounds  - Make Fall Risk Sign visible to staff  - Offer Toileting every 2 Hours, in advance of need  - Initiate/Maintain bed alarm  - Obtain necessary fall risk management equipment: walker   - Apply yellow socks and bracelet for high fall risk patients  - Consider moving patient to room near nurses station  Outcome: Progressing     Problem: DISCHARGE PLANNING  Goal: Discharge to home or other facility with appropriate resources  Description: INTERVENTIONS:  - Identify barriers to discharge w/patient and caregiver  - Arrange for needed discharge resources and transportation as appropriate  - Identify discharge learning needs (meds, wound care, etc.)  - Arrange for interpretive services to assist at discharge as needed  - Refer to Case Management Department for coordinating discharge planning if the patient needs post-hospital services based on physician/advanced practitioner order or complex needs related to functional status, cognitive ability, or social support system  Outcome: Progressing     Problem: Knowledge Deficit  Goal: Patient/family/caregiver demonstrates understanding of disease process, treatment plan, medications, and discharge instructions  Description: Complete learning assessment and assess knowledge base.   Interventions:  - Provide teaching at level of understanding  - Provide teaching via preferred learning methods  Outcome: Progressing     Problem: Prexisting or High Potential for Compromised Skin Integrity  Goal: Skin integrity is maintained or improved  Description: INTERVENTIONS:  - Identify patients at risk for skin breakdown  - Assess and monitor skin integrity  - Assess and monitor nutrition and hydration status  - Monitor labs   - Assess for incontinence   - Turn and reposition patient  - Assist with mobility/ambulation  - Relieve pressure over bony prominences  - Avoid friction and shearing  - Provide appropriate hygiene as needed including keeping skin clean and dry  - Evaluate need for skin moisturizer/barrier cream  - Collaborate with interdisciplinary team   - Patient/family teaching  - Consider wound care consult   Outcome: Progressing

## 2023-10-24 NOTE — ASSESSMENT & PLAN NOTE
-Maintained on nasal cannula oxygen at home  -Continue plan of care per pulmonology and medical team

## 2023-10-25 ENCOUNTER — APPOINTMENT (OUTPATIENT)
Dept: PULMONOLOGY | Facility: HOSPITAL | Age: 78
End: 2023-10-25
Attending: INTERNAL MEDICINE
Payer: COMMERCIAL

## 2023-10-25 LAB
ALBUMIN SERPL BCP-MCNC: 3.4 G/DL (ref 3.5–5)
ALP SERPL-CCNC: 49 U/L (ref 34–104)
ALT SERPL W P-5'-P-CCNC: 28 U/L (ref 7–52)
ANION GAP SERPL CALCULATED.3IONS-SCNC: 4 MMOL/L
ANISOCYTOSIS BLD QL SMEAR: PRESENT
AST SERPL W P-5'-P-CCNC: 18 U/L (ref 13–39)
BASOPHILS # BLD MANUAL: 0 THOUSAND/UL (ref 0–0.1)
BASOPHILS NFR MAR MANUAL: 0 % (ref 0–1)
BILIRUB SERPL-MCNC: 0.61 MG/DL (ref 0.2–1)
BUN SERPL-MCNC: 31 MG/DL (ref 5–25)
CALCIUM ALBUM COR SERPL-MCNC: 8.9 MG/DL (ref 8.3–10.1)
CALCIUM SERPL-MCNC: 8.4 MG/DL (ref 8.4–10.2)
CHLORIDE SERPL-SCNC: 99 MMOL/L (ref 96–108)
CO2 SERPL-SCNC: 38 MMOL/L (ref 21–32)
CREAT SERPL-MCNC: 0.95 MG/DL (ref 0.6–1.3)
EOSINOPHIL # BLD MANUAL: 0 THOUSAND/UL (ref 0–0.4)
EOSINOPHIL NFR BLD MANUAL: 0 % (ref 0–6)
ERYTHROCYTE [DISTWIDTH] IN BLOOD BY AUTOMATED COUNT: 15.6 % (ref 11.6–15.1)
GFR SERPL CREATININE-BSD FRML MDRD: 75 ML/MIN/1.73SQ M
GLUCOSE SERPL-MCNC: 111 MG/DL (ref 65–140)
HCT VFR BLD AUTO: 44 % (ref 36.5–49.3)
HGB BLD-MCNC: 13.5 G/DL (ref 12–17)
LYMPHOCYTES # BLD AUTO: 1.36 THOUSAND/UL (ref 0.6–4.47)
LYMPHOCYTES # BLD AUTO: 11 % (ref 14–44)
MCH RBC QN AUTO: 30.1 PG (ref 26.8–34.3)
MCHC RBC AUTO-ENTMCNC: 30.7 G/DL (ref 31.4–37.4)
MCV RBC AUTO: 98 FL (ref 82–98)
MONOCYTES # BLD AUTO: 1.36 THOUSAND/UL (ref 0–1.22)
MONOCYTES NFR BLD: 11 % (ref 4–12)
NEUTROPHILS # BLD MANUAL: 9.66 THOUSAND/UL (ref 1.85–7.62)
NEUTS SEG NFR BLD AUTO: 78 % (ref 43–75)
OVALOCYTES BLD QL SMEAR: PRESENT
PLATELET # BLD AUTO: 229 THOUSANDS/UL (ref 149–390)
PLATELET BLD QL SMEAR: ADEQUATE
PMV BLD AUTO: 10.7 FL (ref 8.9–12.7)
POTASSIUM SERPL-SCNC: 3.9 MMOL/L (ref 3.5–5.3)
PROT SERPL-MCNC: 4.9 G/DL (ref 6.4–8.4)
RBC # BLD AUTO: 4.48 MILLION/UL (ref 3.88–5.62)
RBC MORPH BLD: PRESENT
SODIUM SERPL-SCNC: 141 MMOL/L (ref 135–147)
WBC # BLD AUTO: 12.38 THOUSAND/UL (ref 4.31–10.16)

## 2023-10-25 PROCEDURE — 94640 AIRWAY INHALATION TREATMENT: CPT

## 2023-10-25 PROCEDURE — 94664 DEMO&/EVAL PT USE INHALER: CPT

## 2023-10-25 PROCEDURE — 99232 SBSQ HOSP IP/OBS MODERATE 35: CPT | Performed by: INTERNAL MEDICINE

## 2023-10-25 PROCEDURE — 80053 COMPREHEN METABOLIC PANEL: CPT | Performed by: INTERNAL MEDICINE

## 2023-10-25 PROCEDURE — 99233 SBSQ HOSP IP/OBS HIGH 50: CPT | Performed by: INTERNAL MEDICINE

## 2023-10-25 PROCEDURE — 94760 N-INVAS EAR/PLS OXIMETRY 1: CPT

## 2023-10-25 PROCEDURE — 85027 COMPLETE CBC AUTOMATED: CPT | Performed by: INTERNAL MEDICINE

## 2023-10-25 PROCEDURE — 85007 BL SMEAR W/DIFF WBC COUNT: CPT | Performed by: INTERNAL MEDICINE

## 2023-10-25 PROCEDURE — 94669 MECHANICAL CHEST WALL OSCILL: CPT

## 2023-10-25 RX ADMIN — DIPHENHYDRAMINE HYDROCHLORIDE 50 MG: 25 TABLET ORAL at 21:51

## 2023-10-25 RX ADMIN — FUROSEMIDE 40 MG: 40 TABLET ORAL at 09:26

## 2023-10-25 RX ADMIN — GUAIFENESIN 1200 MG: 600 TABLET ORAL at 17:18

## 2023-10-25 RX ADMIN — FORMOTEROL FUMARATE DIHYDRATE 20 MCG: 20 SOLUTION RESPIRATORY (INHALATION) at 20:26

## 2023-10-25 RX ADMIN — FORMOTEROL FUMARATE DIHYDRATE 20 MCG: 20 SOLUTION RESPIRATORY (INHALATION) at 07:42

## 2023-10-25 RX ADMIN — BENZONATATE 100 MG: 100 CAPSULE ORAL at 20:10

## 2023-10-25 RX ADMIN — FAMOTIDINE 20 MG: 20 TABLET, FILM COATED ORAL at 20:10

## 2023-10-25 RX ADMIN — LEVALBUTEROL HYDROCHLORIDE 1.25 MG: 1.25 SOLUTION RESPIRATORY (INHALATION) at 13:14

## 2023-10-25 RX ADMIN — METOPROLOL SUCCINATE 12.5 MG: 25 TABLET, FILM COATED, EXTENDED RELEASE ORAL at 20:10

## 2023-10-25 RX ADMIN — PREDNISONE 40 MG: 20 TABLET ORAL at 09:23

## 2023-10-25 RX ADMIN — FLUTICASONE PROPIONATE 2 SPRAY: 50 SPRAY, METERED NASAL at 17:18

## 2023-10-25 RX ADMIN — BENZONATATE 100 MG: 100 CAPSULE ORAL at 15:15

## 2023-10-25 RX ADMIN — LEVALBUTEROL HYDROCHLORIDE 1.25 MG: 1.25 SOLUTION RESPIRATORY (INHALATION) at 07:22

## 2023-10-25 RX ADMIN — GUAIFENESIN 1200 MG: 600 TABLET ORAL at 09:23

## 2023-10-25 RX ADMIN — BENZONATATE 100 MG: 100 CAPSULE ORAL at 09:23

## 2023-10-25 RX ADMIN — CEFTRIAXONE 2000 MG: 2 INJECTION, SOLUTION INTRAVENOUS at 15:15

## 2023-10-25 RX ADMIN — ATORVASTATIN CALCIUM 80 MG: 80 TABLET, FILM COATED ORAL at 17:18

## 2023-10-25 RX ADMIN — BUDESONIDE 0.5 MG: 0.5 INHALANT ORAL at 07:22

## 2023-10-25 RX ADMIN — LEVALBUTEROL HYDROCHLORIDE 1.25 MG: 1.25 SOLUTION RESPIRATORY (INHALATION) at 19:58

## 2023-10-25 RX ADMIN — HEPARIN SODIUM 5000 UNITS: 5000 INJECTION INTRAVENOUS; SUBCUTANEOUS at 20:10

## 2023-10-25 RX ADMIN — FLUTICASONE PROPIONATE 2 SPRAY: 50 SPRAY, METERED NASAL at 09:28

## 2023-10-25 RX ADMIN — PANTOPRAZOLE SODIUM 20 MG: 20 TABLET, DELAYED RELEASE ORAL at 05:26

## 2023-10-25 RX ADMIN — GABAPENTIN 300 MG: 300 CAPSULE ORAL at 20:10

## 2023-10-25 RX ADMIN — ASPIRIN 81 MG CHEWABLE TABLET 81 MG: 81 TABLET CHEWABLE at 20:10

## 2023-10-25 RX ADMIN — HEPARIN SODIUM 5000 UNITS: 5000 INJECTION INTRAVENOUS; SUBCUTANEOUS at 05:26

## 2023-10-25 RX ADMIN — METOPROLOL SUCCINATE 12.5 MG: 25 TABLET, FILM COATED, EXTENDED RELEASE ORAL at 09:25

## 2023-10-25 RX ADMIN — BUDESONIDE 0.5 MG: 0.5 INHALANT ORAL at 19:58

## 2023-10-25 NOTE — PROGRESS NOTES
Pastoral Care Progress Note    10/25/2023  Patient: Aisha Collins : 1945  Admission Date & Time: 10/18/2023 1138  MRN: [id number] [facility]: [id number]      [address] [place] [address] initiated visit to pt in setting of lengthy admission. Pt not available.                 10/25/23 1300   Clinical Encounter Type   Visited With Patient not available

## 2023-10-25 NOTE — ASSESSMENT & PLAN NOTE
-Maintained on nasal cannula oxygen therapy  -Continue plan of care per primary team and pulmonology  -If patient continues to be fatigued would consider ABG for evaluation of CO2 retention.

## 2023-10-25 NOTE — PLAN OF CARE
Problem: MOBILITY - ADULT  Goal: Maintain or return to baseline ADL function  Description: INTERVENTIONS:  -  Assess patient's ability to carry out ADLs; assess patient's baseline for ADL function and identify physical deficits which impact ability to perform ADLs (bathing, care of mouth/teeth, toileting, grooming, dressing, etc.)  - Assess/evaluate cause of self-care deficits   - Assess range of motion  - Assess patient's mobility; develop plan if impaired  - Assess patient's need for assistive devices and provide as appropriate  - Encourage maximum independence but intervene and supervise when necessary  - Involve family in performance of ADLs  - Assess for home care needs following discharge   - Consider OT consult to assist with ADL evaluation and planning for discharge  - Provide patient education as appropriate  Outcome: Progressing  Goal: Maintains/Returns to pre admission functional level  Description: INTERVENTIONS:  - Perform BMAT or MOVE assessment daily.   - Set and communicate daily mobility goal to care team and patient/family/caregiver. - Collaborate with rehabilitation services on mobility goals if consulted  - Perform Range of Motion 3 times a day. - Reposition patient every 2 hours.   - Dangle patient 3 times a day  - Stand patient 3 times a day  - Ambulate patient 3 times a day  - Out of bed to chair 3 times a day   - Out of bed for meals 3 times a day  - Out of bed for toileting  - Record patient progress and toleration of activity level   Outcome: Progressing     Problem: PAIN - ADULT  Goal: Verbalizes/displays adequate comfort level or baseline comfort level  Description: Interventions:  - Encourage patient to monitor pain and request assistance  - Assess pain using appropriate pain scale  - Administer analgesics based on type and severity of pain and evaluate response  - Implement non-pharmacological measures as appropriate and evaluate response  - Consider cultural and social influences on pain and pain management  - Notify physician/advanced practitioner if interventions unsuccessful or patient reports new pain  Outcome: Progressing     Problem: INFECTION - ADULT  Goal: Absence or prevention of progression during hospitalization  Description: INTERVENTIONS:  - Assess and monitor for signs and symptoms of infection  - Monitor lab/diagnostic results  - Monitor all insertion sites, i.e. indwelling lines, tubes, and drains  - Monitor endotracheal if appropriate and nasal secretions for changes in amount and color  - [place] appropriate cooling/warming therapies per order  - Administer medications as ordered  - Instruct and encourage patient and family to use good hand hygiene technique  - Identify and instruct in appropriate isolation precautions for identified infection/condition  Outcome: Progressing     Problem: SAFETY ADULT  Goal: Maintain or return to baseline ADL function  Description: INTERVENTIONS:  -  Assess patient's ability to carry out ADLs; assess patient's baseline for ADL function and identify physical deficits which impact ability to perform ADLs (bathing, care of mouth/teeth, toileting, grooming, dressing, etc.)  - Assess/evaluate cause of self-care deficits   - Assess range of motion  - Assess patient's mobility; develop plan if impaired  - Assess patient's need for assistive devices and provide as appropriate  - Encourage maximum independence but intervene and supervise when necessary  - Involve family in performance of ADLs  - Assess for home care needs following discharge   - Consider OT consult to assist with ADL evaluation and planning for discharge  - Provide patient education as appropriate  Outcome: Progressing  Goal: Maintains/Returns to pre admission functional level  Description: INTERVENTIONS:  - Perform BMAT or MOVE assessment daily.   - Set and communicate daily mobility goal to care team and patient/family/caregiver.    - Collaborate with rehabilitation services on mobility goals if consulted  - Perform Range of Motion 3 times a day. - Reposition patient every 2 hours.   - Dangle patient 3 times a day  - Stand patient 3 times a day  - Ambulate patient 3 times a day  - Out of bed to chair 3 times a day   - Out of bed for meals 3 times a day  - Out of bed for toileting  - Record patient progress and toleration of activity level   Outcome: Progressing  Goal: Patient will remain free of falls  Description: INTERVENTIONS:  - Educate patient/family on patient safety including physical limitations  - Instruct patient to call for assistance with activity   - Consult OT/PT to assist with strengthening/mobility   - Keep Call bell within reach  - Keep bed low and locked with side rails adjusted as appropriate  - Keep care items and personal belongings within reach  - Initiate and maintain comfort rounds  - Make Fall Risk Sign visible to staff  - Offer Toileting every 2 Hours, in advance of need  - Initiate/Maintain bed alarm  - Obtain necessary fall risk management equipment:   - Apply yellow socks and bracelet for high fall risk patients  - Consider moving patient to room near nurses station  Outcome: Progressing     Problem: DISCHARGE PLANNING  Goal: Discharge to home or other facility with appropriate resources  Description: INTERVENTIONS:  - Identify barriers to discharge w/patient and caregiver  - Arrange for needed discharge resources and transportation as appropriate  - Identify discharge learning needs (meds, wound care, etc.)  - Arrange for interpretive services to assist at discharge as needed  - Refer to Case Management Department for coordinating discharge planning if the patient needs post-hospital services based on physician/advanced practitioner order or complex needs related to functional status, cognitive ability, or social support system  Outcome: Progressing     Problem: Knowledge Deficit  Goal: Patient/family/caregiver demonstrates understanding of disease process, treatment plan, medications, and discharge instructions  Description: Complete learning assessment and assess knowledge base.   Interventions:  - Provide teaching at level of understanding  - Provide teaching via preferred learning methods  Outcome: Progressing     Problem: Prexisting or High Potential for Compromised Skin Integrity  Goal: Skin integrity is maintained or improved  Description: INTERVENTIONS:  - Identify patients at risk for skin breakdown  - Assess and monitor skin integrity  - Assess and monitor nutrition and hydration status  - Monitor labs   - Assess for incontinence   - Turn and reposition patient  - Assist with mobility/ambulation  - Relieve pressure over bony prominences  - Avoid friction and shearing  - Provide appropriate hygiene as needed including keeping skin clean and dry  - Evaluate need for skin moisturizer/barrier cream  - Collaborate with interdisciplinary team   - Patient/family teaching  - Consider wound care consult   Outcome: Progressing

## 2023-10-25 NOTE — PROGRESS NOTES
Progress Note - [facility] Infectious Disease   [facility] Kaylyn 77 y.o. male MRN: [id number]  Unit/Bed#: -01 Encounter: [id number]      IMPRESSION & RECOMMENDATIONS:   1. Polymicrobial bacteremia. In only 1 of 3 sets with 2 different organisms isolated. This could represent a contaminant. Consider possibility of transient bacteremia associated with pneumonia. Discussed with micro lab, will continue to work up Moraxella spp and GPC seen on gram stain. Patient remains stable without any clinical sepsis. Leukocytosis confounded by steroids. Repeat blood cultures are negative.   -Continue ceftriaxone for now awaiting additional data  -Follow-up repeat blood cultures  -No additional work-up for now awaiting additional data  -Check CBC with differential and CMP     2. Possible community-acquired pneumonia. With equivocal findings noted on CT of the chest, and with a repeatedly normal procalcitonin level.  -Antibiotics as above for now  -Follow-up blood cultures     3. Acute on chronic respiratory failure. All in the setting of COPD exacerbation ans suspected pneumonia. -Monitor respiratory status  -Oxygen support  -Steroids and breathing treatments  -Close pulmonary follow-up     4. COPD. O2 dependent with a possible acute exacerbation. Her status improved with steroids and breathing treatments and antibiotics     5. Acute on chronic combined systolic and diastolic congestive heart failure    Antibiotics:  Ceftriaxone restart D3    I have discussed the above management plan in detail with patient. I have discussed the above management plan in detail with patient's RN, and the primary service, SLIM.     24 Hour events:  Yesterday and overnight notes reviewed. Still awaiting final blood culture. Subjective:  Patient has no fever, chills, sweats; no nausea, vomiting, diarrhea; denies SOB today and reports improved cough; no pain. No new symptoms. Wants to go home.      Objective:  Vitals:  Temp:  [96.9 °F (36.1 °C)-98.2 °F (36.8 °C)] 96.9 °F (36.1 °C)  HR:  [91-98] 91  Resp:  [16-18] 16  BP: ()/(52-67) 94/67  SpO2:  [91 %-100 %] 99 %  Temp (24hrs), Av.6 °F (36.4 °C), Min:96.9 °F (36.1 °C), Max:98.2 °F (36.8 °C)  Current: Temperature: (!) 96.9 °F (36.1 °C)    PHYSICAL EXAM:  General Appearance:  Appearing well, nontoxic, and in no distress   HEENT: Normocephalic, without obvious abnormality, atraumatic. Conjunctiva pink and sclera anicteric. Oropharynx moist without lesions. [place]. Lungs:   Clear to auscultation bilaterally though decreased throughout, respirations unlabored, CPAP in place via nasal interphase    Heart:  RRR; no murmur, rub or gallop   Abdomen:   Soft, non-tender, non-distended, positive bowel sounds    Extremities: No cyanosis, clubbing or edema   Musculoskeletal: Back symmetric without curvature, ROM normal.    Skin: No rashes or lesions. No draining wounds noted. Peripheral IV intact without evidence of erythema, warmth, or exudate. LABS, IMAGING, & OTHER STUDIES:  Extensive review of the medical records in epic including review of the notes, radiographs, and laboratory results were reviewed personally as below. Lab Results:  I have personally reviewed pertinent labs.   Results from last 7 days   Lab Units 10/25/23  0525 10/24/23  0520 10/23/23  0452   WBC Thousand/uL 12.38* 16.06* 15.41*   HEMOGLOBIN g/dL 13.5 14.5 12.9   PLATELETS Thousands/uL 229 263 219     Results from last 7 days   Lab Units 10/25/23  0525 10/24/23  0520 10/23/23  0452 10/19/23  0513 10/18/23  1210   SODIUM mmol/L 141 141 141   < > 143   POTASSIUM mmol/L 3.9 4.2 4.2   < > 3.1*   CHLORIDE mmol/L 99 98 101   < > 105   CO2 mmol/L 38* 36* 34*   < > 31   BUN mg/dL 31* 33* 32*   < > 13   CREATININE mg/dL 0.95 1.15 1.16   < > 1.02   EGFR ml/min/1.73sq m 75 60 59   < > 69   CALCIUM mg/dL 8.4 8.6 8.4   < > 8.6   AST U/L 18  --   --   --  22   ALT U/L 28  --   --   --  20   ALK PHOS U/L 49  --   --   --  53    < > = values in this interval not displayed. Results from last 7 days   Lab Units 10/23/23  1736 10/18/23  1751 10/18/23  1714 10/18/23  1324 10/18/23  1323   BLOOD CULTURE  No Growth at 24 hrs. No Growth at 24 hrs.  --   --  Moraxella species/Psychrobacter species* No Growth After 5 Days. SPUTUM CULTURE   --  4+ Growth of  Commensal respiratory robi only; No significant growth of Staph aureus/MRSA or Pseudomonas aeruginosa. --   --   --    GRAM STAIN RESULT   --  1+ Epithelial cells per low power field*  1+ Polys*  1+ Gram positive cocci in clusters*  --  Gram negative rods*  Gram positive cocci in chains and clusters*  --    LEGIONELLA URINARY ANTIGEN   --   --  Negative  --   --      Results from last 7 days   Lab Units 10/20/23  0641 10/19/23  0513 10/18/23  1210   PROCALCITONIN ng/ml 0.09 0.09 0.10                   Imaging Studies:   I have personally reviewed pertinent imaging study reports and images in PACS. Other Studies:   I have personally reviewed pertinent report including blood cultures.

## 2023-10-25 NOTE — ASSESSMENT & PLAN NOTE
Wt Readings from Last 3 Encounters:   10/25/23 78.9 kg (174 lb 0.9 oz)   10/02/23 82.9 kg (182 lb 12.8 oz)   09/29/23 82.1 kg (181 lb)     -We will continue patient on metoprolol succinate 12.5 mg twice daily and continue oral furosemide 40 mg daily  -If patient's mean arterial pressure appears stable consistently on regimen may attempt reinitiation of low-dose spironolactone however this and Entresto had to be discontinued in the past due to hypotension  -Continue to monitor patient clinically at this time  -Patient counseled on sodium and fluid restricted diet along with standing daily weights  -Replete electrolytes to goal potassium 4.0, magnesium 2.0

## 2023-10-25 NOTE — PLAN OF CARE
Problem: MOBILITY - ADULT  Goal: Maintain or return to baseline ADL function  Description: INTERVENTIONS:  -  Assess patient's ability to carry out ADLs; assess patient's baseline for ADL function and identify physical deficits which impact ability to perform ADLs (bathing, care of mouth/teeth, toileting, grooming, dressing, etc.)  - Assess/evaluate cause of self-care deficits   - Assess range of motion  - Assess patient's mobility; develop plan if impaired  - Assess patient's need for assistive devices and provide as appropriate  - Encourage maximum independence but intervene and supervise when necessary  - Involve family in performance of ADLs  - Assess for home care needs following discharge   - Consider OT consult to assist with ADL evaluation and planning for discharge  - Provide patient education as appropriate  Outcome: Progressing  Goal: Maintains/Returns to pre admission functional level  Description: INTERVENTIONS:  - Perform BMAT or MOVE assessment daily.   - Set and communicate daily mobility goal to care team and patient/family/caregiver. - Collaborate with rehabilitation services on mobility goals if consulted  - Perform Range of Motion 3 times a day. - Reposition patient every 2 hours.   - Dangle patient 3 times a day  - Stand patient 3 times a day  - Ambulate patient 3 times a day  - Out of bed to chair 3 times a day   - Out of bed for meals 3 times a day  - Out of bed for toileting  - Record patient progress and toleration of activity level   Outcome: Progressing     Problem: PAIN - ADULT  Goal: Verbalizes/displays adequate comfort level or baseline comfort level  Description: Interventions:  - Encourage patient to monitor pain and request assistance  - Assess pain using appropriate pain scale  - Administer analgesics based on type and severity of pain and evaluate response  - Implement non-pharmacological measures as appropriate and evaluate response  - Consider cultural and social influences on pain and pain management  - Notify physician/advanced practitioner if interventions unsuccessful or patient reports new pain  Outcome: Progressing     Problem: INFECTION - ADULT  Goal: Absence or prevention of progression during hospitalization  Description: INTERVENTIONS:  - Assess and monitor for signs and symptoms of infection  - Monitor lab/diagnostic results  - Monitor all insertion sites, i.e. indwelling lines, tubes, and drains  - Monitor endotracheal if appropriate and nasal secretions for changes in amount and color  - Moran appropriate cooling/warming therapies per order  - Administer medications as ordered  - Instruct and encourage patient and family to use good hand hygiene technique  - Identify and instruct in appropriate isolation precautions for identified infection/condition  Outcome: Progressing  Goal: Absence of fever/infection during neutropenic period  Description: INTERVENTIONS:  - Monitor WBC    Outcome: Progressing     Problem: SAFETY ADULT  Goal: Maintain or return to baseline ADL function  Description: INTERVENTIONS:  -  Assess patient's ability to carry out ADLs; assess patient's baseline for ADL function and identify physical deficits which impact ability to perform ADLs (bathing, care of mouth/teeth, toileting, grooming, dressing, etc.)  - Assess/evaluate cause of self-care deficits   - Assess range of motion  - Assess patient's mobility; develop plan if impaired  - Assess patient's need for assistive devices and provide as appropriate  - Encourage maximum independence but intervene and supervise when necessary  - Involve family in performance of ADLs  - Assess for home care needs following discharge   - Consider OT consult to assist with ADL evaluation and planning for discharge  - Provide patient education as appropriate  Outcome: Progressing  Goal: Maintains/Returns to pre admission functional level  Description: INTERVENTIONS:  - Perform BMAT or MOVE assessment daily.   - Set and communicate daily mobility goal to care team and patient/family/caregiver. - Collaborate with rehabilitation services on mobility goals if consulted  - Perform Range of Motion 3 times a day. - Reposition patient every 2 hours.   - Dangle patient 3 times a day  - Stand patient 3 times a day  - Ambulate patient 3 times a day  - Out of bed to chair 3 times a day   - Out of bed for meals 3 times a day  - Out of bed for toileting  - Record patient progress and toleration of activity level   Outcome: Progressing  Goal: Patient will remain free of falls  Description: INTERVENTIONS:  - Educate patient/family on patient safety including physical limitations  - Instruct patient to call for assistance with activity   - Consult OT/PT to assist with strengthening/mobility   - Keep Call bell within reach  - Keep bed low and locked with side rails adjusted as appropriate  - Keep care items and personal belongings within reach  - Initiate and maintain comfort rounds  - Make Fall Risk Sign visible to staff  - Offer Toileting every 2 Hours, in advance of need  - Initiate/Maintain bed alarm  - Obtain necessary fall risk management equipment: walker  - Apply yellow socks and bracelet for high fall risk patients  - Consider moving patient to room near nurses station  Outcome: Progressing     Problem: DISCHARGE PLANNING  Goal: Discharge to home or other facility with appropriate resources  Description: INTERVENTIONS:  - Identify barriers to discharge w/patient and caregiver  - Arrange for needed discharge resources and transportation as appropriate  - Identify discharge learning needs (meds, wound care, etc.)  - Arrange for interpretive services to assist at discharge as needed  - Refer to Case Management Department for coordinating discharge planning if the patient needs post-hospital services based on physician/advanced practitioner order or complex needs related to functional status, cognitive ability, or social support system  Outcome: Progressing     Problem: Knowledge Deficit  Goal: Patient/family/caregiver demonstrates understanding of disease process, treatment plan, medications, and discharge instructions  Description: Complete learning assessment and assess knowledge base.   Interventions:  - Provide teaching at level of understanding  - Provide teaching via preferred learning methods  Outcome: Progressing     Problem: Prexisting or High Potential for Compromised Skin Integrity  Goal: Skin integrity is maintained or improved  Description: INTERVENTIONS:  - Identify patients at risk for skin breakdown  - Assess and monitor skin integrity  - Assess and monitor nutrition and hydration status  - Monitor labs   - Assess for incontinence   - Turn and reposition patient  - Assist with mobility/ambulation  - Relieve pressure over bony prominences  - Avoid friction and shearing  - Provide appropriate hygiene as needed including keeping skin clean and dry  - Evaluate need for skin moisturizer/barrier cream  - Collaborate with interdisciplinary team   - Patient/family teaching  - Consider wound care consult   Outcome: Progressing

## 2023-10-25 NOTE — PROGRESS NOTES
[address]  Progress Note  Name: Ventura Hubbard  MRN: [id number]  Unit/Bed#: -01 I Date of Admission: 10/18/2023   Date of Service: 10/25/2023 I Hospital Day: 6    Assessment/Plan   Acute systolic congestive heart failure [facility])  Assessment & Plan  Wt Readings from Last 3 Encounters:   10/25/23 78.9 kg (174 lb 0.9 oz)   10/02/23 82.9 kg (182 lb 12.8 oz)   09/29/23 82.1 kg (181 lb)     -We will continue patient on metoprolol succinate 12.5 mg twice daily and continue oral furosemide 40 mg daily  -If patient's mean arterial pressure appears stable consistently on regimen may attempt reinitiation of low-dose spironolactone however this and Entresto had to be discontinued in the past due to hypotension  -Continue to monitor patient clinically at this time  -Patient counseled on sodium and fluid restricted diet along with standing daily weights  -Replete electrolytes to goal potassium 4.0, magnesium 2.0        Chronic kidney disease, stage 3a [facility])  Assessment & Plan  Lab Results   Component Value Date    EGFR 75 10/25/2023    EGFR 60 10/24/2023    EGFR 59 10/23/2023    CREATININE 0.95 10/25/2023    CREATININE 1.15 10/24/2023    CREATININE 1.16 10/23/2023     -Renal function appears stable continue to monitor with diuretic therapy    Chronic respiratory failure with hypoxia (HCC)  Assessment & Plan  -Maintained on nasal cannula oxygen therapy  -Continue plan of care per primary team and pulmonology  -If patient continues to be fatigued would consider ABG for evaluation of CO2 retention. Hypertension  Assessment & Plan  -Now with issues with hypotension will attempt to uptitrate medical therapy as tolerated        Subjective:   Patient seen and examined. Per patient denies chest pain, palpitations, lightheadedness or dizziness. Notes still having issues with shortness of breath but is improved since admission.   Notes having fatigue today but no other significant complaints. Summary comments:  -Continue current medical therapy and will uptitrate as patient tolerates  -Continue to monitor patient clinically and if continues to have fatigue will check ABG for evaluation of CO2 levels. Vitals: Blood pressure 114/59, pulse 90, temperature (!) 96.9 °F (36.1 °C), temperature source Oral, resp. rate 16, height 5' 8" (1.727 m), weight 78.9 kg (174 lb 0.9 oz), SpO2 94 %.,   Orthostatic Blood Pressures      Flowsheet Row Most Recent Value   Blood Pressure 114/59 filed at 10/25/2023 4786   Patient Position - Orthostatic VS Lying filed at 10/24/2023 2252        ,   Weight (last 2 days)       Date/Time Weight    10/25/23 0600 78.9 (174.05)    10/25/23 0500 78.9 (174.05)    10/24/23 0600 79.9 (176.26)    10/23/23 0600 80.6 (177.69)            Physical Exam:  Physical Exam  Vitals reviewed. Constitutional:       General: He is not in acute distress. Appearance: He is not diaphoretic. HENT:      Head: Normocephalic and atraumatic. Comments: Nasal cannula oxygen in place  Eyes:      General:         Right eye: No discharge. Left eye: No discharge. Neck:      Comments: Trachea midline, minimal JVD present  Cardiovascular:      Rate and Rhythm: Normal rate and regular rhythm. Heart sounds: No friction rub. Pulmonary:      Effort: No respiratory distress. Breath sounds: Wheezing and rhonchi present. Chest:      Chest wall: No tenderness. Abdominal:      General: Bowel sounds are normal.      Palpations: Abdomen is soft. Tenderness: There is no abdominal tenderness. There is no rebound. Musculoskeletal:      Right lower leg: No edema. Left lower leg: No edema. Skin:     General: Skin is warm and dry. Neurological:      Mental Status: He is alert.       Comments: Awake, alert, able to answer questions appropriately, hard of hearing   Psychiatric:         Mood and Affect: Mood normal.         Behavior: Behavior normal. Medications:      Current Facility-Administered Medications:     acetaminophen (TYLENOL) tablet 650 mg, 650 mg, Oral, Q6H PRN, Briseydai Todhe, DO    albuterol (PROVENTIL HFA,VENTOLIN HFA) inhaler 2 puff, 2 puff, Inhalation, Q4H PRN, Pandi Todhe, DO    albuterol inhalation solution 2.5 mg, 2.5 mg, Nebulization, Q6H PRN, Briseydai Todhe, DO    aspirin chewable tablet 81 mg, 81 mg, Oral, HS, Pandi Todhe, DO, 81 mg at 10/24/23 2144    atorvastatin (LIPITOR) tablet 80 mg, 80 mg, Oral, Daily With Dinner, Tesha Todhe, DO, 80 mg at 10/24/23 1745    benzonatate (TESSALON PERLES) capsule 100 mg, 100 mg, Oral, TID, Briseydai Todhe, DO, 100 mg at 10/25/23 0923    budesonide (PULMICORT) inhalation solution 0.5 mg, 0.5 mg, Nebulization, Q12H, Briseydai Todhe, DO, 0.5 mg at 10/25/23 9275    cefTRIAXone (ROCEPHIN) IVPB (premix in dextrose) 2,000 mg 50 mL, 2,000 mg, Intravenous, Q24H, Onita Goldberg, MD, Last Rate: 100 mL/hr at 10/24/23 1504, 2,000 mg at 10/24/23 1504    cyanocobalamin injection 1,000 mcg, 1,000 mcg, Intramuscular, Q30 Days, Tesha Toterese, DO, 1,000 mcg at 10/19/23 0316    diphenhydrAMINE (BENADRYL) tablet 50 mg, 50 mg, Oral, HS PRN, Jim Mccarty PA-C, 50 mg at 10/24/23 2152    famotidine (PEPCID) tablet 20 mg, 20 mg, Oral, HS, Tesha Tojanele, DO, 20 mg at 10/24/23 2144    fluticasone (FLONASE) 50 mcg/act nasal spray 2 spray, 2 spray, Each Nare, BID, Tesha Tojanele, DO, 2 spray at 10/25/23 0928    formoterol (PERFOROMIST) nebulizer solution 20 mcg, 20 mcg, Nebulization, Q12H, Pandi Todhe, DO, 20 mcg at 10/25/23 0742    furosemide (LASIX) tablet 40 mg, 40 mg, Oral, Daily, Pandi Todhe, DO, 40 mg at 10/25/23 0926    gabapentin (NEURONTIN) capsule 300 mg, 300 mg, Oral, HS, Pandi Todhe, DO, 300 mg at 10/24/23 2144    guaiFENesin (MUCINEX) 12 hr tablet 1,200 mg, 1,200 mg, Oral, BID, Pandi Todhe, DO, 1,200 mg at 10/25/23 0923    heparin (porcine) subcutaneous injection 5,000 Units, 5,000 Units, Subcutaneous, Q8H [address], 5,000 Units at 10/25/23 0526 **AND** [CANCELED] Platelet count, , , Once, Tesha Avila DO    levalbuterol (XOPENEX) inhalation solution 1.25 mg, 1.25 mg, Nebulization, TID, 1.25 mg at 10/25/23 0722 **AND** [DISCONTINUED] sodium chloride 0.9 % inhalation solution 3 mL, 3 mL, Nebulization, TID, Tesha Avila DO    metoprolol succinate (TOPROL-XL) 24 hr tablet 12.5 mg, 12.5 mg, Oral, BID, Lakia Gallog, DO, 12.5 mg at 10/25/23 0925    pantoprazole (PROTONIX) EC tablet 20 mg, 20 mg, Oral, Early Morning, Tesha Avila DO, 20 mg at 10/25/23 0526    predniSONE tablet 40 mg, 40 mg, Oral, Daily, Roby Garner MD, 40 mg at 10/25/23 2186    sodium chloride (AYR SALINE NASAL) nasal gel 1 Application, 1 Application, Nasal, Q6U PRN, Roby Garner MD, 1 Application at 67/30/10 0936     Labs & Results:    Troponins:    Results from last 7 days   Lab Units 10/18/23  1210   HS TNI 0HR ng/L 26        BNP:   Results from last 6 Months   Lab Units 10/21/23  1639 10/18/23  1210   BNP pg/mL 1,892* 774*     CBC with diff:   Results from last 7 days   Lab Units 10/25/23  0525 10/24/23  0520   WBC Thousand/uL 12.38* 16.06*   HEMOGLOBIN g/dL 13.5 14.5   HEMATOCRIT % 44.0 47.1   MCV fL 98 99*   PLATELETS Thousands/uL 229 263     TSH:     CMP:   Results from last 7 days   Lab Units 10/25/23  0525 10/24/23  0520 10/19/23  0513 10/18/23  1210   POTASSIUM mmol/L 3.9 4.2   < > 3.1*   CHLORIDE mmol/L 99 98   < > 105   CO2 mmol/L 38* 36*   < > 31   BUN mg/dL 31* 33*   < > 13   CREATININE mg/dL 0.95 1.15   < > 1.02   AST U/L 18  --   --  22   ALT U/L 28  --   --  20   EGFR ml/min/1.73sq m 75 60   < > 69    < > = values in this interval not displayed.      Lipid Profile:     Coags:

## 2023-10-25 NOTE — OCCUPATIONAL THERAPY NOTE
Occupational Therapy Cancellation Note       10/25/23 0855   OT Last Visit   OT Visit Date 10/25/23   Note Type   Note Type Cancelled Session   Cancel Reasons Other     Chart reviewed. Pt receiving breathing treatment. Not available for OT treatment at this time. Will follow-up as able and appropriate.     Ed Hernandez MS, OTR/L

## 2023-10-25 NOTE — ASSESSMENT & PLAN NOTE
Wt Readings from Last 3 Encounters:   10/25/23 78.9 kg (174 lb 0.9 oz)   10/02/23 82.9 kg (182 lb 12.8 oz)   09/29/23 82.1 kg (181 lb)   Patient has been transitioned to oral Lasix  Cardiology input appreciated  Entresto discontinued due to low blood pressures  Continue current cardiac medications  Monitor PENG's Daily weight

## 2023-10-25 NOTE — ASSESSMENT & PLAN NOTE
78-year-old male patient with COPD on 3 L/min of oxygen presents with increased productive cough, shortness of breath, wheezing and increased O2 requirements up to 4L NC    CXR in ER showing New bilateral lower lung pneumonia. Received ceftriaxone and and azithromycin on admission    Blood cultures from admission 1 set positive for gram-negative rods and gram-positive cocci. We will continue ceftriaxone. Follow-up final cultures and sensitivities. Infectious disease input appreciated. Follow-up repeat blood cultures.   Supportive care

## 2023-10-25 NOTE — PROGRESS NOTES
[id number]  Progress Note  Name: Alberto Benitez  MRN: [id number]  Unit/Bed#: -01 I Date of Admission: 10/18/2023   Date of Service: 10/25/2023 I Hospital Day: 6    Assessment/Plan   Pneumonia of right lower lobe due to infectious organism  Assessment & Plan  74-year-old male patient with COPD on 3 L/min of oxygen presents with increased productive cough, shortness of breath, wheezing and increased O2 requirements up to 4L NC    CXR in ER showing New bilateral lower lung pneumonia. Received ceftriaxone and and azithromycin on admission    Blood cultures from admission 1 set positive for gram-negative rods and gram-positive cocci. We will continue ceftriaxone. Follow-up final cultures and sensitivities. Infectious disease input appreciated. Follow-up repeat blood cultures. Supportive care      * COPD with acute exacerbation [facility])  Assessment & Plan  Presents with increased work of breathing wheezing and coughing  Suspect patient is in COPD exacerbation secondary to pneumonia  Received Solu-Medrol 125 mg in the ER and DuoNeb treatment  Patient improved with IV steroids.   Now on oral prednisone, will continue to taper  Finished 3 days of Azithromycin  Ipratropium and Xopenex nebulizers 3 times daily  Continue nebulizers    Pulmonology input appreciated    Acute systolic congestive heart failure (HCC)  Assessment & Plan  Wt Readings from Last 3 Encounters:   10/25/23 78.9 kg (174 lb 0.9 oz)   10/02/23 82.9 kg (182 lb 12.8 oz)   09/29/23 82.1 kg (181 lb)   Patient has been transitioned to oral Lasix  Cardiology input appreciated  Entresto discontinued due to low blood pressures  Continue current cardiac medications  Monitor PENG's Daily weight    Chronic kidney disease, stage 3a [facility])  Assessment & Plan  Lab Results   Component Value Date    EGFR 75 10/25/2023    EGFR 60 10/24/2023    EGFR 59 10/23/2023    CREATININE 0.95 10/25/2023    CREATININE 1.15 10/24/2023    CREATININE 1.16 10/23/2023     Creatinine appears to be around baseline. We will continue routine lab monitoring    Chronic respiratory failure with hypoxia (HCC)  Assessment & Plan  Chronic respiratory failure with hypoxia due to chronic COPD a/e/b need for continuous O2, normally at 3 LPM, on 4 LPM when admitted  Back to baseline O2 requirements    LAMBERTO on CPAP  Assessment & Plan  Continue cpap qhs    Hypertension  Assessment & Plan  Continue home Toprol-XL    Coronary artery disease involving native coronary artery of native heart without angina pectoris  Assessment & Plan  Continue home aspirin 81 mg daily, Toprol 12.5 mg daily, and resume home Crestor 40 mg daily           VTE Prophylaxis:  Heparin    Patient Centered Rounds: I have performed bedside rounds with nursing staff today. Discussions with Specialists or Other Care Team Provider: yes  Education and Discussions with Family / Patient: Spoke with patients wife over the phone regarding plan of care    Current Length of Stay: 6 day(s)    Current Patient Status: Inpatient   Certification Statement: The patient will continue to require additional inpatient hospital stay due to Bacteremia    Discharge Plan: Hopeful discharge home in the next 24 to 48 hours    Code Status: Level 2 - DNAR: but accepts endotracheal intubation    Subjective:   No overnight events noted    Objective:     Vitals:   Temp (24hrs), Av.6 °F (36.4 °C), Min:96.9 °F (36.1 °C), Max:98.2 °F (36.8 °C)    Temp:  [96.9 °F (36.1 °C)-98.2 °F (36.8 °C)] 96.9 °F (36.1 °C)  HR:  [90-98] 90  Resp:  [16-18] 16  BP: ()/(57-67) 114/59  SpO2:  [91 %-100 %] 94 %  Body mass index is 26.46 kg/m². Input and Output Summary (last 24 hours): Intake/Output Summary (Last 24 hours) at 10/25/2023 1259  Last data filed at 10/25/2023 0844  Gross per 24 hour   Intake 240 ml   Output 600 ml   Net -360 ml       Physical Exam:   Physical Exam  Constitutional:       General: He is not in acute distress.   HENT: Head: Normocephalic and atraumatic. Nose: Nose normal.      Mouth/Throat:      Mouth: Mucous membranes are moist.   Eyes:      Extraocular Movements: Extraocular movements intact. Conjunctiva/sclera: Conjunctivae normal.   Cardiovascular:      Rate and Rhythm: Normal rate and regular rhythm. Pulmonary:      Effort: Pulmonary effort is normal. No respiratory distress. Breath sounds: Rhonchi present. Abdominal:      Palpations: Abdomen is soft. Tenderness: There is no abdominal tenderness. Musculoskeletal:         General: Normal range of motion. Cervical back: Normal range of motion and neck supple. Comments:  Generalized weakness   Skin:     General: Skin is warm and dry. Neurological:      General: No focal deficit present. Mental Status: He is alert. Cranial Nerves: No cranial nerve deficit. Psychiatric:         Mood and Affect: Mood normal.         Behavior: Behavior normal.         Additional Data:     Labs:    Results from last 7 days   Lab Units 10/25/23  0525   WBC Thousand/uL 12.38*   HEMOGLOBIN g/dL 13.5   HEMATOCRIT % 44.0   PLATELETS Thousands/uL 229   LYMPHO PCT % 11*   MONO PCT % 11   EOS PCT % 0     Results from last 7 days   Lab Units 10/25/23  0525   SODIUM mmol/L 141   POTASSIUM mmol/L 3.9   CHLORIDE mmol/L 99   CO2 mmol/L 38*   BUN mg/dL 31*   CREATININE mg/dL 0.95   CALCIUM mg/dL 8.4   ALK PHOS U/L 49   ALT U/L 28   AST U/L 18                   * I Have Reviewed All Lab Data Listed Above. * Additional Pertinent Lab Tests Reviewed: [facility] Admission  Reviewed    Imaging:  Imaging Reports Reviewed Today Include: No new imaging    Recent Cultures (last 7 days):     Results from last 7 days   Lab Units 10/23/23  1736 10/18/23  1751 10/18/23  1714 10/18/23  1324 10/18/23  1323   BLOOD CULTURE  No Growth at 24 hrs. No Growth at 24 hrs.  --   --  Moraxella species/Psychrobacter species* No Growth After 5 Days.    SPUTUM CULTURE --  4+ Growth of  Commensal respiratory robi only; No significant growth of Staph aureus/MRSA or Pseudomonas aeruginosa.   --   --   --    GRAM STAIN RESULT   --  1+ Epithelial cells per low power field*  1+ Polys*  1+ Gram positive cocci in clusters*  --  Gram negative rods*  Gram positive cocci in chains and clusters*  --    LEGIONELLA URINARY ANTIGEN   --   --  Negative  --   --        Last 24 Hours Medication List:   Current Facility-Administered Medications   Medication Dose Route Frequency Provider Last Rate    acetaminophen  650 mg Oral Q6H PRN Pandi Todhe, DO      albuterol  2 puff Inhalation Q4H PRN Pandi Todhe, DO      albuterol  2.5 mg Nebulization Q6H PRN Pandi Todhe, DO      aspirin  81 mg Oral HS Pandi Todhe, DO      atorvastatin  80 mg Oral Daily With Dominguez Oil, DO      benzonatate  100 mg Oral TID Pandi Todhe, DO      budesonide  0.5 mg Nebulization Q12H Pandi Todhe, DO      cefTRIAXone  2,000 mg Intravenous Q24H Era Alvarenga MD 2,000 mg (10/24/23 1504)    cyanocobalamin  1,000 mcg Intramuscular Q30 Days Pandi Todhe, DO      diphenhydrAMINE  50 mg Oral HS PRN Antonette Potter PA-C      famotidine  20 mg Oral HS Pandi Todhe, DO      fluticasone  2 spray Each Nare BID Pandi Todhe, DO      formoterol  20 mcg Nebulization Q12H Pandi Todhe, DO      furosemide  40 mg Oral Daily Pandi Todhe, DO      gabapentin  300 mg Oral HS Pandi Todhe, DO      guaiFENesin  1,200 mg Oral BID Pandi Todhe, DO      heparin (porcine)  5,000 Units Subcutaneous Q8H [facility] Pandi Todhe, DO      levalbuterol  1.25 mg Nebulization TID Pandi Todhe, DO      metoprolol succinate  12.5 mg Oral BID Leonardo Ocasio, DO      pantoprazole  20 mg Oral Early Morning Pandi Todhe, DO      [START ON 10/26/2023] predniSONE  30 mg Oral Daily Era Alvarenga MD      sodium chloride  1 Application Nasal Z7O PRN Laurita Damon MD          Today, Patient Was Seen By: Era Alvarenga MD    ** Please Note: Dictation voice to text software may have been used in the creation of this document.  **

## 2023-10-25 NOTE — ASSESSMENT & PLAN NOTE
Lab Results   Component Value Date    EGFR 75 10/25/2023    EGFR 60 10/24/2023    EGFR 59 10/23/2023    CREATININE 0.95 10/25/2023    CREATININE 1.15 10/24/2023    CREATININE 1.16 10/23/2023     Creatinine appears to be around baseline.   We will continue routine lab monitoring

## 2023-10-25 NOTE — ASSESSMENT & PLAN NOTE
Lab Results   Component Value Date    EGFR 75 10/25/2023    EGFR 60 10/24/2023    EGFR 59 10/23/2023    CREATININE 0.95 10/25/2023    CREATININE 1.15 10/24/2023    CREATININE 1.16 10/23/2023     -Renal function appears stable continue to monitor with diuretic therapy

## 2023-10-26 LAB
ANION GAP SERPL CALCULATED.3IONS-SCNC: 6 MMOL/L
BUN SERPL-MCNC: 29 MG/DL (ref 5–25)
CALCIUM SERPL-MCNC: 8.3 MG/DL (ref 8.4–10.2)
CHLORIDE SERPL-SCNC: 98 MMOL/L (ref 96–108)
CO2 SERPL-SCNC: 37 MMOL/L (ref 21–32)
CREAT SERPL-MCNC: 1.03 MG/DL (ref 0.6–1.3)
ERYTHROCYTE [DISTWIDTH] IN BLOOD BY AUTOMATED COUNT: 15.5 % (ref 11.6–15.1)
GFR SERPL CREATININE-BSD FRML MDRD: 68 ML/MIN/1.73SQ M
GLUCOSE SERPL-MCNC: 102 MG/DL (ref 65–140)
HCT VFR BLD AUTO: 44.6 % (ref 36.5–49.3)
HGB BLD-MCNC: 13.8 G/DL (ref 12–17)
MCH RBC QN AUTO: 30.3 PG (ref 26.8–34.3)
MCHC RBC AUTO-ENTMCNC: 30.9 G/DL (ref 31.4–37.4)
MCV RBC AUTO: 98 FL (ref 82–98)
PLATELET # BLD AUTO: 241 THOUSANDS/UL (ref 149–390)
PMV BLD AUTO: 10.6 FL (ref 8.9–12.7)
POTASSIUM SERPL-SCNC: 3.9 MMOL/L (ref 3.5–5.3)
RBC # BLD AUTO: 4.56 MILLION/UL (ref 3.88–5.62)
SODIUM SERPL-SCNC: 141 MMOL/L (ref 135–147)
WBC # BLD AUTO: 16.17 THOUSAND/UL (ref 4.31–10.16)

## 2023-10-26 PROCEDURE — 85027 COMPLETE CBC AUTOMATED: CPT | Performed by: INTERNAL MEDICINE

## 2023-10-26 PROCEDURE — 94664 DEMO&/EVAL PT USE INHALER: CPT

## 2023-10-26 PROCEDURE — 94668 MNPJ CHEST WALL SBSQ: CPT

## 2023-10-26 PROCEDURE — 94640 AIRWAY INHALATION TREATMENT: CPT

## 2023-10-26 PROCEDURE — 99232 SBSQ HOSP IP/OBS MODERATE 35: CPT | Performed by: INTERNAL MEDICINE

## 2023-10-26 PROCEDURE — 99233 SBSQ HOSP IP/OBS HIGH 50: CPT | Performed by: INTERNAL MEDICINE

## 2023-10-26 PROCEDURE — 80048 BASIC METABOLIC PNL TOTAL CA: CPT | Performed by: INTERNAL MEDICINE

## 2023-10-26 PROCEDURE — 94760 N-INVAS EAR/PLS OXIMETRY 1: CPT

## 2023-10-26 RX ORDER — FUROSEMIDE 20 MG/1
20 TABLET ORAL DAILY
Status: DISCONTINUED | OUTPATIENT
Start: 2023-10-27 | End: 2023-10-27 | Stop reason: HOSPADM

## 2023-10-26 RX ADMIN — PREDNISONE 30 MG: 20 TABLET ORAL at 09:08

## 2023-10-26 RX ADMIN — BENZONATATE 100 MG: 100 CAPSULE ORAL at 15:44

## 2023-10-26 RX ADMIN — FLUTICASONE PROPIONATE 2 SPRAY: 50 SPRAY, METERED NASAL at 17:48

## 2023-10-26 RX ADMIN — IPRATROPIUM BROMIDE 0.5 MG: 0.5 SOLUTION RESPIRATORY (INHALATION) at 19:42

## 2023-10-26 RX ADMIN — HEPARIN SODIUM 5000 UNITS: 5000 INJECTION INTRAVENOUS; SUBCUTANEOUS at 15:45

## 2023-10-26 RX ADMIN — GUAIFENESIN 1200 MG: 600 TABLET ORAL at 09:08

## 2023-10-26 RX ADMIN — LEVALBUTEROL HYDROCHLORIDE 1.25 MG: 1.25 SOLUTION RESPIRATORY (INHALATION) at 14:06

## 2023-10-26 RX ADMIN — FORMOTEROL FUMARATE DIHYDRATE 20 MCG: 20 SOLUTION RESPIRATORY (INHALATION) at 08:48

## 2023-10-26 RX ADMIN — PANTOPRAZOLE SODIUM 20 MG: 20 TABLET, DELAYED RELEASE ORAL at 05:04

## 2023-10-26 RX ADMIN — FUROSEMIDE 40 MG: 40 TABLET ORAL at 09:08

## 2023-10-26 RX ADMIN — FLUTICASONE PROPIONATE 2 SPRAY: 50 SPRAY, METERED NASAL at 09:09

## 2023-10-26 RX ADMIN — BUDESONIDE 0.5 MG: 0.5 INHALANT ORAL at 08:48

## 2023-10-26 RX ADMIN — METOPROLOL SUCCINATE 12.5 MG: 25 TABLET, FILM COATED, EXTENDED RELEASE ORAL at 21:12

## 2023-10-26 RX ADMIN — CEFTRIAXONE 2000 MG: 2 INJECTION, SOLUTION INTRAVENOUS at 15:35

## 2023-10-26 RX ADMIN — DIPHENHYDRAMINE HYDROCHLORIDE 50 MG: 25 TABLET ORAL at 22:28

## 2023-10-26 RX ADMIN — ASPIRIN 81 MG CHEWABLE TABLET 81 MG: 81 TABLET CHEWABLE at 21:13

## 2023-10-26 RX ADMIN — ATORVASTATIN CALCIUM 80 MG: 80 TABLET, FILM COATED ORAL at 15:44

## 2023-10-26 RX ADMIN — LEVALBUTEROL HYDROCHLORIDE 1.25 MG: 1.25 SOLUTION RESPIRATORY (INHALATION) at 08:48

## 2023-10-26 RX ADMIN — BENZONATATE 100 MG: 100 CAPSULE ORAL at 21:13

## 2023-10-26 RX ADMIN — LEVALBUTEROL HYDROCHLORIDE 1.25 MG: 1.25 SOLUTION RESPIRATORY (INHALATION) at 19:42

## 2023-10-26 RX ADMIN — BUDESONIDE 0.5 MG: 0.5 INHALANT ORAL at 19:44

## 2023-10-26 RX ADMIN — GABAPENTIN 300 MG: 300 CAPSULE ORAL at 21:13

## 2023-10-26 RX ADMIN — METOPROLOL SUCCINATE 12.5 MG: 25 TABLET, FILM COATED, EXTENDED RELEASE ORAL at 09:07

## 2023-10-26 RX ADMIN — FAMOTIDINE 20 MG: 20 TABLET, FILM COATED ORAL at 21:13

## 2023-10-26 RX ADMIN — HEPARIN SODIUM 5000 UNITS: 5000 INJECTION INTRAVENOUS; SUBCUTANEOUS at 05:04

## 2023-10-26 RX ADMIN — BENZONATATE 100 MG: 100 CAPSULE ORAL at 09:07

## 2023-10-26 RX ADMIN — IPRATROPIUM BROMIDE 0.5 MG: 0.5 SOLUTION RESPIRATORY (INHALATION) at 14:06

## 2023-10-26 NOTE — ASSESSMENT & PLAN NOTE
Chronic respiratory failure with hypoxia due to chronic COPD  Continue oxygen, currently requiring 3 to 4 L nasal cannula

## 2023-10-26 NOTE — ASSESSMENT & PLAN NOTE
Wt Readings from Last 3 Encounters:   10/26/23 79 kg (174 lb 2.6 oz)   10/02/23 82.9 kg (182 lb 12.8 oz)   09/29/23 82.1 kg (181 lb)   Patient has been transitioned to oral Lasix  Cardiology input appreciated  Entresto discontinued due to low blood pressures  Continue current cardiac medications  Monitor PENG's Daily weight

## 2023-10-26 NOTE — ASSESSMENT & PLAN NOTE
Wt Readings from Last 3 Encounters:   10/26/23 79 kg (174 lb 2.6 oz)   10/02/23 82.9 kg (182 lb 12.8 oz)   09/29/23 82.1 kg (181 lb)     Continue outpatient regimen right now.

## 2023-10-26 NOTE — ASSESSMENT & PLAN NOTE
58-year-old male patient with COPD on 3 L/min of oxygen presents with increased productive cough, shortness of breath, wheezing and increased O2 requirements up to 4L NC    CXR in ER showing New bilateral lower lung pneumonia. Received ceftriaxone and and azithromycin on admission    Blood cultures from admission 1 set positive for gram-negative rods and gram-positive cocci. Bacteria identified as Moraxella. Will continue ceftriaxone. Follow-up final cultures and sensitivities. Infectious disease input appreciated. Repeat blood cultures negative to date.   Supportive care

## 2023-10-26 NOTE — PROGRESS NOTES
[address]  Progress Note  Name: Neo Dumont  MRN: [id number]  Unit/Bed#: -01 I Date of Admission: 10/18/2023   Date of Service: 10/26/2023 I Hospital Day: 7    Assessment/Plan   Pneumonia of right lower lobe due to infectious organism  Assessment & Plan  48-year-old male patient with COPD on 3 L/min of oxygen presents with increased productive cough, shortness of breath, wheezing and increased O2 requirements up to 4L NC    CXR in ER showing New bilateral lower lung pneumonia. Received ceftriaxone and and azithromycin on admission    Blood cultures from admission 1 set positive for gram-negative rods and gram-positive cocci. Bacteria identified as Moraxella. Will continue ceftriaxone. Follow-up final cultures and sensitivities. Infectious disease input appreciated. Repeat blood cultures negative to date. Supportive care      * COPD with acute exacerbation [facility])  Assessment & Plan  Presents with increased work of breathing wheezing and coughing  Suspect patient is in COPD exacerbation secondary to pneumonia  Received Solu-Medrol 125 mg in the ER and DuoNeb treatment  Patient improved with IV steroids.   Now on oral prednisone, will continue to taper  Finished 3 days of Azithromycin  Ipratropium and Xopenex nebulizers 3 times daily  Continue nebulizers    Pulmonology input appreciated    Acute systolic congestive heart failure ([address])  Assessment & Plan  Wt Readings from Last 3 Encounters:   10/26/23 79 kg (174 lb 2.6 oz)   10/02/23 82.9 kg (182 lb 12.8 oz)   09/29/23 82.1 kg (181 lb)   Patient has been transitioned to oral Lasix  Cardiology input appreciated  Entresto discontinued due to low blood pressures  Continue current cardiac medications  Monitor PENG's Daily weight    Chronic kidney disease, stage 3a [facility])  Assessment & Plan  Lab Results   Component Value Date    EGFR 68 10/26/2023    EGFR 75 10/25/2023    EGFR 60 10/24/2023    CREATININE 1.03 10/26/2023    CREATININE 0.95 10/25/2023    CREATININE 1.15 10/24/2023     Creatinine appears to be around baseline. We will continue routine lab monitoring    Chronic respiratory failure with hypoxia (HCC)  Assessment & Plan  Chronic respiratory failure with hypoxia due to chronic COPD  Continue oxygen, currently requiring 3 to 4 L nasal cannula    LAMBERTO on CPAP  Assessment & Plan  Continue cpap qhs    Coronary artery disease involving native coronary artery of native heart without angina pectoris  Assessment & Plan  Continue home aspirin 81 mg daily, Toprol 12.5 mg daily, and resume home Crestor 40 mg daily           VTE Prophylaxis:  Heparin    Patient Centered Rounds: I have performed bedside rounds with nursing staff today. Discussions with Specialists or Other Care Team Provider: yes  Education and Discussions with Family / Patient: Updated patient regarding plan of care    Current Length of Stay: 7 day(s)    Current Patient Status: Inpatient   Certification Statement: The patient will continue to require additional inpatient hospital stay due to bacteremia    Discharge Plan: Hopeful discharge home tomorrow    Code Status: Level 2 - DNAR: but accepts endotracheal intubation    Subjective:   No overnight events noted    Objective:     Vitals:   Temp (24hrs), Av.5 °F (36.9 °C), Min:98.3 °F (36.8 °C), Max:98.7 °F (37.1 °C)    Temp:  [98.3 °F (36.8 °C)-98.7 °F (37.1 °C)] 98.3 °F (36.8 °C)  HR:  [85-87] 86  Resp:  [18] 18  BP: ()/(53-66) 112/66  SpO2:  [91 %-99 %] 96 %  Body mass index is 26.48 kg/m². Input and Output Summary (last 24 hours): Intake/Output Summary (Last 24 hours) at 10/26/2023 1224  Last data filed at 10/26/2023 1154  Gross per 24 hour   Intake 440 ml   Output 1550 ml   Net -1110 ml       Physical Exam:   Physical Exam  Constitutional:       General: He is not in acute distress. HENT:      Head: Normocephalic and atraumatic.       Ears:      Comments: Hard of hearing     Nose: Nose normal. Mouth/Throat:      Mouth: Mucous membranes are moist.   Eyes:      Extraocular Movements: Extraocular movements intact. Conjunctiva/sclera: Conjunctivae normal.   Cardiovascular:      Rate and Rhythm: Normal rate and regular rhythm. Pulmonary:      Effort: Pulmonary effort is normal. No respiratory distress. Breath sounds: Rhonchi present. Abdominal:      Palpations: Abdomen is soft. Tenderness: There is no abdominal tenderness. Musculoskeletal:         General: Normal range of motion. Cervical back: Normal range of motion and neck supple. Skin:     General: Skin is warm and dry. Neurological:      General: No focal deficit present. Mental Status: He is alert. Mental status is at baseline. Cranial Nerves: No cranial nerve deficit. Psychiatric:         Mood and Affect: Mood normal.         Behavior: Behavior normal.         Additional Data:     Labs:    Results from last 7 days   Lab Units 10/26/23  0511 10/25/23  0525   WBC Thousand/uL 16.17* 12.38*   HEMOGLOBIN g/dL 13.8 13.5   HEMATOCRIT % 44.6 44.0   PLATELETS Thousands/uL 241 229   LYMPHO PCT %  --  11*   MONO PCT %  --  11   EOS PCT %  --  0     Results from last 7 days   Lab Units 10/26/23  0511 10/25/23  0525   SODIUM mmol/L 141 141   POTASSIUM mmol/L 3.9 3.9   CHLORIDE mmol/L 98 99   CO2 mmol/L 37* 38*   BUN mg/dL 29* 31*   CREATININE mg/dL 1.03 0.95   CALCIUM mg/dL 8.3* 8.4   ALK PHOS U/L  --  49   ALT U/L  --  28   AST U/L  --  18                   * I Have Reviewed All Lab Data Listed Above. * Additional Pertinent Lab Tests Reviewed: [address] [place] [address] Admission  Reviewed    Imaging:  Imaging Reports Reviewed Today Include: No new imaging    Recent Cultures (last 7 days):     Results from last 7 days   Lab Units 10/23/23  1736   BLOOD CULTURE  No Growth at 48 hrs. No Growth at 48 hrs.        Last 24 Hours Medication List:   Current Facility-Administered Medications   Medication Dose Route Frequency Provider Last Rate    acetaminophen  650 mg Oral Q6H PRN Pandi Todhe, DO      albuterol  2 puff Inhalation Q4H PRN Pandi Todhe, DO      albuterol  2.5 mg Nebulization Q6H PRN Pandi Todhe, DO      aspirin  81 mg Oral HS Pandi Todhe, DO      atorvastatin  80 mg Oral Daily With Dominguez Oil, DO      benzonatate  100 mg Oral TID Pandi Todhe, DO      budesonide  0.5 mg Nebulization Q12H Pandi Todhe, DO      cefTRIAXone  2,000 mg Intravenous Q24H Daja Rodrigues MD 2,000 mg (10/25/23 1515)    cyanocobalamin  1,000 mcg Intramuscular Q30 Days Pandi Todhe, DO      diphenhydrAMINE  50 mg Oral HS PRN Renetta Kitchen, PA-RAFAL      famotidine  20 mg Oral HS Pandi Todhe, DO      fluticasone  2 spray Each Nare BID Pandi Todhe, DO      [START ON 10/27/2023] furosemide  20 mg Oral Daily Benito Telles MD      gabapentin  300 mg Oral HS Pandi Todhe, DO      guaiFENesin  1,200 mg Oral BID Pandi Todhe, DO      heparin (porcine)  5,000 Units Subcutaneous Q8H [address] Pandi Todhe, DO      ipratropium  0.5 mg Nebulization TID Ashok Nichole MD      levalbuterol  1.25 mg Nebulization TID Pandi Todhe, DO      metoprolol succinate  12.5 mg Oral BID Gonzalo Laser, DO      pantoprazole  20 mg Oral Early Morning Pandi Todhe, DO      predniSONE  30 mg Oral Daily Daja Rodrigues MD      sodium chloride  1 Application Nasal S1J PRN Antoni Barrera MD          Today, Patient Was Seen By: Daja Rodrigues MD    ** Please Note: Dictation voice to text software may have been used in the creation of this document.  **

## 2023-10-26 NOTE — PROGRESS NOTES
Progress Note - Infectious Disease   Olivia Noriega 77 y.o. male MRN: [id number]  Unit/Bed#: -01 Encounter: [id number]      Impression/Plan:  1. Polymicrobial bacteremia. With at least one of the organisms appearing to be a Moraxella. As this organism is a respiratory pathogen this seems more likely a transient bacteremia from a respiratory infection. No gram-positive's isolated thus far which suggest that this organism is a contaminant. Patient remained stable without any clinical sepsis. Waxing and wakening leukocytosis of unclear significance. Repeat blood cultures negative thus far  -Continue ceftriaxone   -Follow-up sensitivities and adjust antibiotics as needed  -Follow-up repeat blood cultures  -No additional work-up for now awaiting additional data  -Check CBC with differential and CMP  -Likely to oral antibiotics tomorrow     2. Possible community-acquired pneumonia. With equivocal findings noted on CT of the chest, and with a repeatedly normal procalcitonin level.  -Antibiotics as above for now  -Follow-up blood culture  -Monitor respiratory status    3. Acute on chronic respiratory failure. All in the setting of COPD exacerbation ans suspected pneumonia. Respiratory status relatively stable. -Monitor respiratory status  -Oxygen support  -Steroids and breathing treatments  -Close pulmonary follow-up     4. COPD. O2 dependent with a possible acute exacerbation. Her status improved with steroids and breathing treatments and antibiotics     5. Acute on chronic combined systolic and diastolic congestive heart failure    Discussed the above management plan with the primary service    Antibiotics:  Ceftriaxone restart 4    Subjective:  Patient has no fever, chills, sweats; no nausea, vomiting, diarrhea; no cough, shortness of breath; no pain. No new symptoms. He got more short of breath with ambulating today.     Objective:  Vitals:  Temp:  [98.3 °F (36.8 °C)-98.7 °F (37.1 °C)] 98.3 °F (36.8 °C)  HR:  [85-90] 86  Resp:  [18] 18  BP: ()/(53-66) 112/66  SpO2:  [91 %-99 %] 96 %  Temp (24hrs), Av.5 °F (36.9 °C), Min:98.3 °F (36.8 °C), Max:98.7 °F (37.1 °C)  Current: Temperature: 98.3 °F (36.8 °C)    Physical Exam:   General Appearance:  Alert, interactive, nontoxic, no acute distress. Throat: Oropharynx moist without lesions. Lungs:   Decreased breath sounds bilaterally; no wheezes, rhonchi or rales; respirations unlabored   Heart:  RRR; no murmur, rub or gallop   Abdomen:   Soft, non-tender, non-distended, positive bowel sounds. Extremities: No clubbing, cyanosis or edema   Skin: No new rashes or lesions. No draining wounds noted. Labs, Imaging, & Other studies:   All pertinent labs and imaging studies were personally reviewed  Results from last 7 days   Lab Units 10/26/23  0511 10/25/23  0525 10/24/23  0520   WBC Thousand/uL 16.17* 12.38* 16.06*   HEMOGLOBIN g/dL 13.8 13.5 14.5   PLATELETS Thousands/uL 241 229 263     Results from last 7 days   Lab Units 10/26/23  0511 10/25/23  0525 10/24/23  0520   SODIUM mmol/L 141 141 141   POTASSIUM mmol/L 3.9 3.9 4.2   CHLORIDE mmol/L 98 99 98   CO2 mmol/L 37* 38* 36*   BUN mg/dL 29* 31* 33*   CREATININE mg/dL 1.03 0.95 1.15   EGFR ml/min/1.73sq m 68 75 60   CALCIUM mg/dL 8.3* 8.4 8.6   AST U/L  --  18  --    ALT U/L  --  28  --    ALK PHOS U/L  --  49  --      Results from last 7 days   Lab Units 10/23/23  1736   BLOOD CULTURE  No Growth at 48 hrs. No Growth at 48 hrs.      Results from last 7 days   Lab Units 10/20/23  0641   PROCALCITONIN ng/ml 0.09

## 2023-10-26 NOTE — PLAN OF CARE
Problem: MOBILITY - ADULT  Goal: Maintain or return to baseline ADL function  Description: INTERVENTIONS:  -  Assess patient's ability to carry out ADLs; assess patient's baseline for ADL function and identify physical deficits which impact ability to perform ADLs (bathing, care of mouth/teeth, toileting, grooming, dressing, etc.)  - Assess/evaluate cause of self-care deficits   - Assess range of motion  - Assess patient's mobility; develop plan if impaired  - Assess patient's need for assistive devices and provide as appropriate  - Encourage maximum independence but intervene and supervise when necessary  - Involve family in performance of ADLs  - Assess for home care needs following discharge   - Consider OT consult to assist with ADL evaluation and planning for discharge  - Provide patient education as appropriate  Outcome: Progressing     Problem: PAIN - ADULT  Goal: Verbalizes/displays adequate comfort level or baseline comfort level  Description: Interventions:  - Encourage patient to monitor pain and request assistance  - Assess pain using appropriate pain scale  - Administer analgesics based on type and severity of pain and evaluate response  - Implement non-pharmacological measures as appropriate and evaluate response  - Consider cultural and social influences on pain and pain management  - Notify physician/advanced practitioner if interventions unsuccessful or patient reports new pain  Outcome: Progressing     Problem: INFECTION - ADULT  Goal: Absence or prevention of progression during hospitalization  Description: INTERVENTIONS:  - Assess and monitor for signs and symptoms of infection  - Monitor lab/diagnostic results  - Monitor all insertion sites, i.e. indwelling lines, tubes, and drains  - Monitor endotracheal if appropriate and nasal secretions for changes in amount and color  - [place] appropriate cooling/warming therapies per order  - Administer medications as ordered  - Instruct and encourage patient and family to use good hand hygiene technique  - Identify and instruct in appropriate isolation precautions for identified infection/condition  Outcome: Progressing     Problem: SAFETY ADULT  Goal: Maintain or return to baseline ADL function  Description: INTERVENTIONS:  -  Assess patient's ability to carry out ADLs; assess patient's baseline for ADL function and identify physical deficits which impact ability to perform ADLs (bathing, care of mouth/teeth, toileting, grooming, dressing, etc.)  - Assess/evaluate cause of self-care deficits   - Assess range of motion  - Assess patient's mobility; develop plan if impaired  - Assess patient's need for assistive devices and provide as appropriate  - Encourage maximum independence but intervene and supervise when necessary  - Involve family in performance of ADLs  - Assess for home care needs following discharge   - Consider OT consult to assist with ADL evaluation and planning for discharge  - Provide patient education as appropriate  Outcome: Progressing

## 2023-10-26 NOTE — PROGRESS NOTES
[address]   Progress Note  Name: Olga Enciso  MRN: [id number]  Unit/Bed#: -01 I Date of Admission: 10/18/2023   Date of Service: 10/26/2023 I Hospital Day: 7    Assessment/Plan   Acute systolic congestive heart failure [facility])  Assessment & Plan  Wt Readings from Last 3 Encounters:   10/26/23 79 kg (174 lb 2.6 oz)   10/02/23 82.9 kg (182 lb 12.8 oz)   09/29/23 82.1 kg (181 lb)     Continue outpatient regimen right now. Coronary artery disease involving native coronary artery of native heart without angina pectoris  Assessment & Plan  No current symptoms. * COPD with acute exacerbation [facility])  Assessment & Plan  Perhaps minimally improved since admit. Outpatient Cardiologist: Karsten/Sherry  He has had numerous coronary events including an MI in 1995. There were coronary interventions as far back as 1993 I believe with the PTCA of the right coronary artery. There was stenting of the right coronary artery 1999 in 2001 and LAD stenting in 2003. There may have been more procedures. He has not had any recent chest pain nor any recent angiograms. Ambulation is mainly limited by dyspnea and there is chronic cough. He stop smoking cigarettes in 1995. Subjective:   Patient seen and examined. No significant events overnight. Breathing slightly improved towards baseline      Summary comments:  Cardiac wise I have resumed his prior regimen. We will continue to see him as new issues arise     Telemetry/ECG/Cardiac testing:   TTE 8/18/2023: LVEF 35% with global dysfunction. RV with mild dysfunction. PA 46. Vitals: Blood pressure 112/66, pulse 86, temperature 98.3 °F (36.8 °C), resp.  rate 18, height 5' 8" (1.727 m), weight 79 kg (174 lb 2.6 oz), SpO2 96 %.,   Orthostatic Blood Pressures      Flowsheet Row Most Recent Value   Blood Pressure 112/66 filed at 10/26/2023 3730   Patient Position - Orthostatic VS Lying filed at 10/25/2023 2011        ,   Weight (last 2 days)       Date/Time Weight    10/26/23 0600 79 (174.16)    10/25/23 0600 78.9 (174.05)    10/25/23 0500 78.9 (174.05)    10/24/23 0600 79.9 (176.26)            Physical Exam:    General:  Normal appearance in no distress. Eyes:  Anicteric. Oral mucosa:  Moist.  Neck:  No JVD. Carotid upstrokes are brisk without bruits. No masses. Well-healed right carotid endarterectomy. Chest:  Decrease in breath sounds throughout with prolonged exp. Cardiac:  No palpable PMI. Normal S1 and S2. No murmur gallop or rub. Abdomen:  Soft and nontender. No palpable organomegaly or aortic enlargement. Extremities:  No peripheral edema. Musculoskeletal:  Symmetric. Vascular:  Femoral pulses are brisk without bruits. Popliteal pulses are intact bilaterally. Pedal pulses are intact. Neuro:  Grossly symmetric. Psych:  Alert and oriented x3.       Medications:      Current Facility-Administered Medications:     acetaminophen (TYLENOL) tablet 650 mg, 650 mg, Oral, Q6H PRN, Briseydai Todhe, DO    albuterol (PROVENTIL HFA,VENTOLIN HFA) inhaler 2 puff, 2 puff, Inhalation, Q4H PRN, Pandi Todhe, DO    albuterol inhalation solution 2.5 mg, 2.5 mg, Nebulization, Q6H PRN, Pandi Todhe, DO    aspirin chewable tablet 81 mg, 81 mg, Oral, HS, Pandi Todhe, DO, 81 mg at 10/25/23 2010    atorvastatin (LIPITOR) tablet 80 mg, 80 mg, Oral, Daily With Dinner, Briseydai Todhe, DO, 80 mg at 10/25/23 1718    benzonatate (TESSALON PERLES) capsule 100 mg, 100 mg, Oral, TID, Pandi Todhe, DO, 100 mg at 10/26/23 0907    budesonide (PULMICORT) inhalation solution 0.5 mg, 0.5 mg, Nebulization, Q12H, Pandi Todhe, DO, 0.5 mg at 10/26/23 0848    cefTRIAXone (ROCEPHIN) IVPB (premix in dextrose) 2,000 mg 50 mL, 2,000 mg, Intravenous, Q24H, Laurie Silveira MD, Last Rate: 100 mL/hr at 10/25/23 1515, 2,000 mg at 10/25/23 1515    cyanocobalamin injection 1,000 mcg, 1,000 mcg, Intramuscular, Q30 Days, Tesha Avila DO, 1,000 mcg at 10/19/23 9937 diphenhydrAMINE (BENADRYL) tablet 50 mg, 50 mg, Oral, HS PRN, Sissy , ROSIBEL, 50 mg at 10/25/23 2151    famotidine (PEPCID) tablet 20 mg, 20 mg, Oral, HS, Tesha Avila, DO, 20 mg at 10/25/23 2010    fluticasone (FLONASE) 50 mcg/act nasal spray 2 spray, 2 spray, Each Nare, BID, Tesha Toterese DO, 2 spray at 10/26/23 0909    [START ON 10/27/2023] furosemide (LASIX) tablet 20 mg, 20 mg, Oral, Daily, Broderick Freed MD    gabapentin (NEURONTIN) capsule 300 mg, 300 mg, Oral, HS, Tesha Avila, DO, 300 mg at 10/25/23 2010    guaiFENesin (MUCINEX) 12 hr tablet 1,200 mg, 1,200 mg, Oral, BID, Tesha Avila DO, 1,200 mg at 10/26/23 0908    heparin (porcine) subcutaneous injection 5,000 Units, 5,000 Units, Subcutaneous, Q8H [address], 5,000 Units at 10/26/23 0504 **AND** [CANCELED] Platelet count, , , Once, Tesha Avila DO    ipratropium (ATROVENT) 0.02 % inhalation solution 0.5 mg, 0.5 mg, Nebulization, TID, Manuela Lane MD    levalbuterol Corean Puffer) inhalation solution 1.25 mg, 1.25 mg, Nebulization, TID, 1.25 mg at 10/26/23 0848 **AND** [DISCONTINUED] sodium chloride 0.9 % inhalation solution 3 mL, 3 mL, Nebulization, TID, Tesha Avila DO    metoprolol succinate (TOPROL-XL) 24 hr tablet 12.5 mg, 12.5 mg, Oral, BID, Aria Castellanos, , 12.5 mg at 10/26/23 0907    pantoprazole (PROTONIX) EC tablet 20 mg, 20 mg, Oral, Early Morning, Tesha Avila DO, 20 mg at 10/26/23 0504    predniSONE tablet 30 mg, 30 mg, Oral, Daily, Jeanne Hendrickson MD, 30 mg at 10/26/23 0908    sodium chloride (AYR SALINE NASAL) nasal gel 1 Application, 1 Application, Nasal, E7R PRN, Danilo Whittington MD, 1 Application at 10/26/31 0936     Labs & Results:    Troponins:         BNP:   Results from last 6 Months   Lab Units 10/21/23  1639 10/18/23  1210   BNP pg/mL 1,892* 774*     CBC with diff:   Results from last 7 days   Lab Units 10/26/23  0511 10/25/23  0525   WBC Thousand/uL 16.17* 12.38*   HEMOGLOBIN g/dL 13.8 13.5 HEMATOCRIT % 44.6 44.0   MCV fL 98 98   PLATELETS Thousands/uL 241 229     TSH:     CMP:   Results from last 7 days   Lab Units 10/26/23  0511 10/25/23  0525   POTASSIUM mmol/L 3.9 3.9   CHLORIDE mmol/L 98 99   CO2 mmol/L 37* 38*   BUN mg/dL 29* 31*   CREATININE mg/dL 1.03 0.95   AST U/L  --  18   ALT U/L  --  28   EGFR ml/min/1.73sq m 68 75     Lipid Profile:     Coags:

## 2023-10-26 NOTE — PLAN OF CARE
HPI: Pt presents today complaining of decreased libido x 2 years which she is attributing to her OCP, [facility]. She says the brand has been changed to multiple different generics which she thinks is playing a role. She just finished her last pack of OCPs 1 week ago. She and her  are contemplating pregnancy so she is not planning on restarting OCP. Otherwise, she has no complaints. She had her annual exam in 8/2018 and isn't due for pap until later this year.     ROS:  GENERAL: Feeling well overall. Denies fever or chills.   SKIN: Denies rash or lesions.   HEAD: Denies head injury or headache.   NODES: Denies enlarged lymph nodes.   CHEST: Denies chest pain or shortness of breath.   CARDIOVASCULAR: Denies palpitations or left sided chest pain.   ABDOMEN: No abdominal pain, constipation, diarrhea, nausea, vomiting or rectal bleeding.   URINARY: No dysuria, hematuria, or burning on urination.  REPRODUCTIVE: See HPI.   BREASTS: Denies pain, lumps, or nipple discharge.   HEMATOLOGIC: No easy bruisability or excessive bleeding.   MUSCULOSKELETAL: Denies joint pain or swelling.   NEUROLOGIC: Denies syncope or weakness.   PSYCHIATRIC: Denies depression, anxiety or mood swings.    PE:   APPEARANCE: Well nourished, well developed, White female in no acute distress.  PELVIC: deferred. Counseling session only.     Diagnosis:  1. Pre-conception counseling    2. Decreased libido        Plan:   - discussed role OCPs can have in decreased libido. Since she was planning on attempting pregnancy anyway this is a good time for her to stop OCPs.   - recommended daily multivitamin or PNV.   - also discussed weaning Adderrall prior to pregnancy as no long term studies on effects to fetus. Pt reports she has already started decreasing her dosage.   - recommended topical treatments for acne if needed. Avoid spironolactone and Accutane.        Follow-up with me in 4 months for annual exam.      Problem: SAFETY ADULT  Goal: Patient will remain free of falls  Description: INTERVENTIONS:  - Educate patient/family on patient safety including physical limitations  - Instruct patient to call for assistance with activity   - Consult OT/PT to assist with strengthening/mobility   - Keep Call bell within reach  - Keep bed low and locked with side rails adjusted as appropriate  - Keep care items and personal belongings within reach  - Initiate and maintain comfort rounds  - Make Fall Risk Sign visible to staff  - Offer Toileting every 2 Hours, in advance of need  - Initiate/Maintain bed alarm  - Apply yellow socks and bracelet for high fall risk patients  - Consider moving patient to room near nurses station  Outcome: Progressing

## 2023-10-26 NOTE — RESPIRATORY THERAPY NOTE
10/25/23 9477   Respiratory Assessment   Resp Comments pts own preset machine with 4 pt needs no help. h2o chamber filled. L. will continue to monitor pt   Non-Invasive Information   O2 Interface Device Nasal mask   Non-Invasive Ventilation Mode CPAP   $ Pulse Oximetry Spot Check Charge Completed   Non-Invasive Settings   FiO2 (%)   (with 4L)   PEEP/CPAP (cm H2O)   (preset)

## 2023-10-26 NOTE — ASSESSMENT & PLAN NOTE
Lab Results   Component Value Date    EGFR 68 10/26/2023    EGFR 75 10/25/2023    EGFR 60 10/24/2023    CREATININE 1.03 10/26/2023    CREATININE 0.95 10/25/2023    CREATININE 1.15 10/24/2023     Creatinine appears to be around baseline.   We will continue routine lab monitoring

## 2023-10-26 NOTE — PROGRESS NOTES
Progress Note - Pulmonary   Kelle Estrella 77 y.o. male MRN: [id number]  Unit/Bed#: -01 Encounter: [id number]      Assessment:  Acute hypoxic respiratory failure  Severe COPD with exacerbation  Possible RLL pneumonia-prelim cultures with a mixed robi  Polymicrobial bacteremia-+ Moraxella, GNR/GPC  Acute combined CHF-systolic/diastolic  Small bilateral pleural effusions    Plan:  Continue nebulized only treatment   Switched to budesonide/Atrovent/Xopenex, discontinue Perforomist  Continue steroid taper/prednisone 30 for 3 days then down by 10 mg every 3 days to stop, we will not consider higher steroids given the bacteremia  Abx as per ID    Subjective:   No overnight acute events. Still not back to baseline yet but feels close. Still with cough/minimal wheezing at rest.  Wearing CPAP at night and with naps    Vitals: Blood pressure 112/66, pulse 86, temperature 98.3 °F (36.8 °C), resp. rate 18, height 5' 8" (1.727 m), weight 79 kg (174 lb 2.6 oz), SpO2 96 %. , Body mass index is 26.48 kg/m². Intake/Output Summary (Last 24 hours) at 10/26/2023 1036  Last data filed at 10/26/2023 0900  Gross per 24 hour   Intake 240 ml   Output 950 ml   Net -710 ml       Physical Exam  Gen: not in acute distress, Body mass index is 26.48 kg/m². HEENT:supple, no accessory muscle use, no JVD appreciated, + significant hearing impairment  Cardiac: RRR, no murmurs appreciated  Lungs: normal respiratory effort, mild expiratory wheeze bilaterally  Abdomen: soft, nontender, normoactive bowel sounds  Extremities: no cyanosis, no clubbing, no edema  Neuro: AAO X3, no focal motor deficit    Labs: I have personally reviewed pertinent lab results.         Jasmin Colmenares MD

## 2023-10-27 ENCOUNTER — APPOINTMENT (INPATIENT)
Dept: RADIOLOGY | Facility: HOSPITAL | Age: 78
DRG: 193 | End: 2023-10-27
Payer: COMMERCIAL

## 2023-10-27 ENCOUNTER — APPOINTMENT (OUTPATIENT)
Dept: PULMONOLOGY | Facility: HOSPITAL | Age: 78
End: 2023-10-27
Attending: INTERNAL MEDICINE
Payer: COMMERCIAL

## 2023-10-27 VITALS
WEIGHT: 175.27 LBS | BODY MASS INDEX: 26.56 KG/M2 | DIASTOLIC BLOOD PRESSURE: 62 MMHG | TEMPERATURE: 97.5 F | SYSTOLIC BLOOD PRESSURE: 98 MMHG | HEART RATE: 97 BPM | HEIGHT: 68 IN | RESPIRATION RATE: 18 BRPM | OXYGEN SATURATION: 93 %

## 2023-10-27 LAB
BACTERIA BLD CULT: ABNORMAL
BACTERIA BLD CULT: ABNORMAL
ERYTHROCYTE [DISTWIDTH] IN BLOOD BY AUTOMATED COUNT: 15.7 % (ref 11.6–15.1)
GRAM STN SPEC: ABNORMAL
GRAM STN SPEC: ABNORMAL
HCT VFR BLD AUTO: 50.3 % (ref 36.5–49.3)
HGB BLD-MCNC: 15.5 G/DL (ref 12–17)
MCH RBC QN AUTO: 30.4 PG (ref 26.8–34.3)
MCHC RBC AUTO-ENTMCNC: 30.8 G/DL (ref 31.4–37.4)
MCV RBC AUTO: 99 FL (ref 82–98)
PLATELET # BLD AUTO: 253 THOUSANDS/UL (ref 149–390)
PMV BLD AUTO: 10.9 FL (ref 8.9–12.7)
RBC # BLD AUTO: 5.1 MILLION/UL (ref 3.88–5.62)
WBC # BLD AUTO: 18.62 THOUSAND/UL (ref 4.31–10.16)

## 2023-10-27 PROCEDURE — 94664 DEMO&/EVAL PT USE INHALER: CPT

## 2023-10-27 PROCEDURE — 99233 SBSQ HOSP IP/OBS HIGH 50: CPT | Performed by: INTERNAL MEDICINE

## 2023-10-27 PROCEDURE — 94760 N-INVAS EAR/PLS OXIMETRY 1: CPT

## 2023-10-27 PROCEDURE — 94640 AIRWAY INHALATION TREATMENT: CPT

## 2023-10-27 PROCEDURE — 97116 GAIT TRAINING THERAPY: CPT

## 2023-10-27 PROCEDURE — 85027 COMPLETE CBC AUTOMATED: CPT | Performed by: INTERNAL MEDICINE

## 2023-10-27 PROCEDURE — 99239 HOSP IP/OBS DSCHRG MGMT >30: CPT | Performed by: INTERNAL MEDICINE

## 2023-10-27 PROCEDURE — 97530 THERAPEUTIC ACTIVITIES: CPT

## 2023-10-27 PROCEDURE — 97112 NEUROMUSCULAR REEDUCATION: CPT

## 2023-10-27 PROCEDURE — 71045 X-RAY EXAM CHEST 1 VIEW: CPT

## 2023-10-27 PROCEDURE — 92610 EVALUATE SWALLOWING FUNCTION: CPT

## 2023-10-27 RX ORDER — CEFPODOXIME PROXETIL 200 MG/1
400 TABLET, FILM COATED ORAL 2 TIMES DAILY WITH MEALS
Qty: 10 TABLET | Refills: 0 | Status: SHIPPED | OUTPATIENT
Start: 2023-10-27 | End: 2023-11-01

## 2023-10-27 RX ORDER — METOPROLOL SUCCINATE 25 MG/1
12.5 TABLET, EXTENDED RELEASE ORAL 2 TIMES DAILY
Qty: 30 TABLET | Refills: 0 | Status: SHIPPED | OUTPATIENT
Start: 2023-10-27

## 2023-10-27 RX ORDER — CEFPODOXIME PROXETIL 200 MG/1
400 TABLET, FILM COATED ORAL 2 TIMES DAILY WITH MEALS
Status: DISCONTINUED | OUTPATIENT
Start: 2023-10-27 | End: 2023-10-27 | Stop reason: HOSPADM

## 2023-10-27 RX ORDER — PREDNISONE 10 MG/1
TABLET ORAL
Qty: 12 TABLET | Refills: 0 | Status: SHIPPED | OUTPATIENT
Start: 2023-10-28 | End: 2023-11-04

## 2023-10-27 RX ADMIN — HEPARIN SODIUM 5000 UNITS: 5000 INJECTION INTRAVENOUS; SUBCUTANEOUS at 14:50

## 2023-10-27 RX ADMIN — FLUTICASONE PROPIONATE 2 SPRAY: 50 SPRAY, METERED NASAL at 09:40

## 2023-10-27 RX ADMIN — BENZONATATE 100 MG: 100 CAPSULE ORAL at 08:02

## 2023-10-27 RX ADMIN — FUROSEMIDE 20 MG: 20 TABLET ORAL at 08:03

## 2023-10-27 RX ADMIN — PANTOPRAZOLE SODIUM 20 MG: 20 TABLET, DELAYED RELEASE ORAL at 07:22

## 2023-10-27 RX ADMIN — LEVALBUTEROL HYDROCHLORIDE 1.25 MG: 1.25 SOLUTION RESPIRATORY (INHALATION) at 13:21

## 2023-10-27 RX ADMIN — ACETAMINOPHEN 650 MG: 325 TABLET ORAL at 08:02

## 2023-10-27 RX ADMIN — ATORVASTATIN CALCIUM 80 MG: 80 TABLET, FILM COATED ORAL at 16:43

## 2023-10-27 RX ADMIN — BUDESONIDE 0.5 MG: 0.5 INHALANT ORAL at 07:38

## 2023-10-27 RX ADMIN — IPRATROPIUM BROMIDE 0.5 MG: 0.5 SOLUTION RESPIRATORY (INHALATION) at 13:21

## 2023-10-27 RX ADMIN — CEFPODOXIME PROXETIL 400 MG: 200 TABLET, FILM COATED ORAL at 17:47

## 2023-10-27 RX ADMIN — CEFPODOXIME PROXETIL 400 MG: 200 TABLET, FILM COATED ORAL at 12:54

## 2023-10-27 RX ADMIN — GUAIFENESIN 1200 MG: 600 TABLET ORAL at 17:47

## 2023-10-27 RX ADMIN — GUAIFENESIN 1200 MG: 600 TABLET ORAL at 08:03

## 2023-10-27 RX ADMIN — LEVALBUTEROL HYDROCHLORIDE 1.25 MG: 1.25 SOLUTION RESPIRATORY (INHALATION) at 07:17

## 2023-10-27 RX ADMIN — FLUTICASONE PROPIONATE 2 SPRAY: 50 SPRAY, METERED NASAL at 17:52

## 2023-10-27 RX ADMIN — PREDNISONE 30 MG: 20 TABLET ORAL at 08:03

## 2023-10-27 RX ADMIN — IPRATROPIUM BROMIDE 0.5 MG: 0.5 SOLUTION RESPIRATORY (INHALATION) at 07:17

## 2023-10-27 RX ADMIN — METOPROLOL SUCCINATE 12.5 MG: 25 TABLET, FILM COATED, EXTENDED RELEASE ORAL at 08:04

## 2023-10-27 RX ADMIN — BENZONATATE 100 MG: 100 CAPSULE ORAL at 16:43

## 2023-10-27 NOTE — PLAN OF CARE
Problem: PHYSICAL THERAPY ADULT  Goal: Performs mobility at highest level of function for planned discharge setting. See evaluation for individualized goals. Description: Treatment/Interventions: Functional transfer training, LE strengthening/ROM, Elevations, Therapeutic exercise, Endurance training, Patient/family training, Equipment eval/education, Bed mobility, Gait training, Compensatory technique education, Spoke to nursing, Spoke to case management, Spoke to MD  Equipment Recommended: Diana Wiseman       See flowsheet documentation for full assessment, interventions and recommendations. Outcome: Progressing  Note: Prognosis: Good  Problem List: Decreased endurance, Impaired balance, Decreased mobility, Decreased safety awareness, Impaired hearing, Pain  Assessment: Pt seen for PT treatment session this date with interventions consisting of transfer training, gait training, neuromuscular re-education of movement performed to improve dynamic standing balance, and education provided as needed for safety and direction to improve functional mobility, safety awareness, and activity tolerance. Pt agreeable to PT treatment session upon arrival, pt found sitting OOB in recliner. At end of session, pt left sitting OOB in recliner with all needs in reach. In comparison to previous session, pt with improvements in ambulation distance . Continue to recommend Home PT at time of d/c in order to maximize pt's functional independence and safety w/ mobility. Pt continues to be functioning below baseline level. PT will continue to see pt while here in order to address the deficits listed above and provide interventions consistent w/ POC in effort to achieve STGs. PT Discharge Recommendation: (S) Home with home health rehabilitation    See flowsheet documentation for full assessment.

## 2023-10-27 NOTE — ASSESSMENT & PLAN NOTE
Chronic respiratory failure with hypoxia due to chronic COPD  Patient back to home oxygen requirement of 3 L nasal cannula

## 2023-10-27 NOTE — NURSING NOTE
Pt discharged to home via car with daughter. Written instructions given with good return demonstration. IV removed with no evidence of bleeding.   Discharged on home O2

## 2023-10-27 NOTE — ASSESSMENT & PLAN NOTE
65-year-old male patient with COPD on 3 L/min of oxygen presents with increased productive cough, shortness of breath, wheezing and increased O2 requirements up to 4L NC    CXR in ER showing New bilateral lower lung pneumonia. Received ceftriaxone and and azithromycin on admission    Blood cultures from admission 1 set positive for gram-negative rods and gram-positive cocci. Bacteria identified as Moraxella. Infectious disease input appreciated. Repeat blood cultures negative to date.   Patient was maintained on IV ceftriaxone while in the hospital.  Discharged on Vantin per infectious disease recommendation    Advised to follow-up with pulmonology as outpatient in 2 to 3 weeks

## 2023-10-27 NOTE — SPEECH THERAPY NOTE
Speech Language/Pathology  Speech/Language Pathology Progress Note    Patient Name: Mireya MONZON Date: 10/27/2023     Problem List  Principal Problem:    COPD with acute exacerbation [facility])  Active Problems:    Coronary artery disease involving native coronary artery of native heart without angina pectoris    LAMBERTO on CPAP    Chronic respiratory failure with hypoxia ([address])    Pneumonia of right lower lobe due to infectious organism    Chronic kidney disease, stage 3a ([address])    Acute systolic congestive heart failure ([address])    Past Medical History  Past Medical History:   Diagnosis Date    Abnormal ECG 2021 OR 2022    Alcoholism ([address]) 1984    sober 38 years    Arthritis 2008    beginning    Bilateral carotid artery stenosis     Callus     Cancer (HCC)     skin    Chronic diastolic heart failure (HCC)     Chronic ischemic heart disease     Chronic kidney disease     stage 3    Colon polyp     COPD (chronic obstructive pulmonary disease) (HCC)     Coronary artery disease     hx stents, MI, PCI    COVID 11/2021    CPAP (continuous positive airway pressure) dependence     Disease of thyroid gland 2017    nodules    Emphysema of lung ([address]) 1995    started    Hearing loss     History of transfusion 1995    Hyperlipidemia     Hypertension     Intracranial aneurysm 04/25/2023    Lung nodule 2023    x rays    MI (myocardial infarction) ([address]) 1995    Myocardial infarction ([address]) 1995    Pneumonia     Pneumothorax 02/20/2023    collapsed lung    Prostate cancer ([address])     RSV (respiratory syncytial virus infection) 10/2022    S/P carotid endarterectomy     Shortness of breath     O2 2 l/nc PRN    Sleep apnea     Sleep apnea, obstructive     Stented coronary artery           Subjective:  Patient received sitting upright in bedside chair. He continues with non productive congested cough with no sputum production.   Pt tolerating mechanical soft solids and thin liquids at this time with no overt s/s of penetration or aspiration related to PO intake. Objective:  NTL:  Pt tolerating via cup at this time, encouraged small sips, slow rate, etc.    Assessment:  Suspect some leniency contributing to pt's overall medical status as pt drinking thin liquids from water bottle. Small sips, small cup, minimal head tilt as able. Pt verbalized his understanding.     Plan/Recommendations:  Mechanical soft solids and nectar thick liquids

## 2023-10-27 NOTE — ASSESSMENT & PLAN NOTE
Presents with increased work of breathing wheezing and coughing  Suspect patient is in COPD exacerbation secondary to pneumonia  Received Solu-Medrol 125 mg in the ER and DuoNeb treatment  Patient improved with IV steroids.   Now on oral prednisone, will continue to taper  Finished 3 days of Azithromycin  Continue home nebulizers    Pulmonology input appreciated

## 2023-10-27 NOTE — NURSING NOTE
Pt is alert and generally oriented. Very hard of hearing. Moves well. C/o pain to right upper quadrant/diaphragmatic area that is sharp and exacerbated by coughing and deep breathing. Medicated per order. Will monitor. Heart tones clear with palpable pulses. Skin is warm and dry with slight discoloration to bilateral lower extremities. Lungs are decreased with inspiratory and expiratory wheezes throughout. Air entry is restricted. Respirations are mildly labored on minimal exertion. Tolerating O2 via nasal cannula. Abdomen is large and soft. Urine unseen. IV site intact.

## 2023-10-27 NOTE — PHYSICAL THERAPY NOTE
PHYSICAL THERAPY TREATMENT NOTE  NAME:  Berny Gonzalez  DATE: 10/27/23    Length Of Stay: 8  Performed at least 2 patient identifiers during session: Name and ID bracelet    TREATMENT FLOWSHEET:    10/27/23 1201   PT Last Visit   PT Visit Date 10/27/23   Note Type   Note Type Treatment   Pain Assessment   Pain Assessment Tool 0-10   Pain Score 6   Pain Location/Orientation Orientation: Right;Location: Rib Cage   Pain Onset/Description Onset: Ongoing;Frequency: Intermittent; Descriptor: Sore   Patient's Stated Pain Goal No pain   Hospital Pain Intervention(s) Repositioned; Ambulation/increased activity   Restrictions/Precautions   Weight Bearing Precautions Per Order No   Other Precautions O2;Fall Risk;Pain;Hard of hearing  (3LO2)   General   Chart Reviewed Yes   Response to Previous Treatment Patient with no complaints from previous session. Family/Caregiver Present No   Cognition   Overall Cognitive Status WFL   Arousal/Participation Alert   Attention Within functional limits   Orientation Level Oriented X4   Memory Within functional limits   Following Commands Follows all commands and directions without difficulty   Subjective   Subjective "I think I need therapy in home."   Bed Mobility   Additional Comments OOB   Transfers   Sit to Stand 5  Supervision   Additional items Assist x 1; Armrests; Increased time required;Verbal cues  (RW)   Stand to Sit 5  Supervision   Additional items Assist x 1; Armrests; Increased time required;Verbal cues   Stand pivot 5  Supervision   Additional items Assist x 1; Armrests; Increased time required;Verbal cues  (RW)   Ambulation/Elevation   Gait pattern Improper Weight shift;Decreased foot clearance; Wide CORDELL; Short stride; Excessively slow;Decreased heel strike;Decreased toe off;Step through pattern   Gait Assistance 5  Supervision   Additional items Assist x 1;Verbal cues   Assistive Device Rolling walker   Distance 80 ft   Balance   Static Sitting Good   Dynamic Sitting Fair + Static Standing Fair   Dynamic Standing Fair   Ambulatory Fair   Endurance Deficit   Endurance Deficit Yes   Endurance Deficit Description SpO2 decreased to 86% after 40 ft of ambulation on 3LO2 and increased above 90% with instruction in proper breathing technique and seated rest. CHACON and SOB   Activity Tolerance   Activity Tolerance Patient limited by fatigue   Medical Staff Made Aware physician aware   Nurse Made Aware RN aware   Exercises   Neuro re-ed Patient performed dynamic standing balance activities supported by RW and close S including multidirectional weight shifting, reaching, and turning B directions. Assessment   Prognosis Good   Problem List Decreased endurance; Impaired balance;Decreased mobility; Decreased safety awareness; Impaired hearing;Pain   Goals   Patient Goals to go home   PT Treatment Day 1   Plan   Treatment/Interventions Functional transfer training;LE strengthening/ROM; Elevations; Therapeutic exercise; Endurance training;Patient/family training;Equipment eval/education; Bed mobility;Gait training; Compensatory technique education;Spoke to nursing;Spoke to case management;Spoke to MD   Progress Progressing toward goals   PT Frequency 3-5x/wk   Discharge Recommendation   PT Discharge Recommendation (S)  Home with home health rehabilitation   Additional Comments Pt made aware of recommendation change and PT agreed. CM aware   AM-PAC Basic Mobility Inpatient   Turning in Flat Bed Without Bedrails 4   Lying on Back to Sitting on Edge of Flat Bed Without Bedrails 4   Moving Bed to Chair 3   Standing Up From Chair Using Arms 4   Walk in Room 3   Climb 3-5 Stairs With Railing 3   Basic Mobility Inpatient Raw Score 21   Basic Mobility Standardized Score 45.55   Highest Level Of Mobility   JH-HLM Goal 6: Walk 10 steps or more   JH-HLM Achieved 7: Walk 25 feet or more       The patient's AM-PAC Basic Mobility Inpatient Short Form Raw Score is 21.  A raw score greater than 16 suggests the patient may benefit from discharge to home. Please also refer to the recommendation of the Physical Therapist for safe discharge planning. Pt seen for PT treatment session this date with interventions consisting of transfer training, gait training, neuromuscular re-education of movement performed to improve dynamic standing balance, and education provided as needed for safety and direction to improve functional mobility, safety awareness, and activity tolerance. Pt agreeable to PT treatment session upon arrival, pt found sitting OOB in recliner. At end of session, pt left sitting OOB in recliner with all needs in reach. In comparison to previous session, pt with improvements in ambulation distance . Continue to recommend Home PT at time of d/c in order to maximize pt's functional independence and safety w/ mobility. Pt continues to be functioning below baseline level. PT will continue to see pt while here in order to address the deficits listed above and provide interventions consistent w/ POC in effort to achieve STGs.     Susan Briceon [place]

## 2023-10-27 NOTE — CASE MANAGEMENT
Case Management Discharge Planning Note    Patient name Isadora Loring  Location /-01 MRN [id number]  : 1945 Date 10/27/2023       Current Admission Date: 10/18/2023  Current Admission Diagnosis:COPD with acute exacerbation [facility])   Patient Active Problem List    Diagnosis Date Noted    Acute systolic congestive heart failure ([address]) 10/22/2023    Erectile dysfunction 2023    Other disorders of refraction 2023    Occlusion and stenosis of unspecified carotid artery 2023    Dysphagia 2023    Congestive heart failure with left ventricular systolic dysfunction (LVSD) ([address]) 2023    Hypoxemia 2023    Intracranial aneurysm 2023    Allergic rhinitis, unspecified 2023    Chronic kidney disease, stage 3a ([address]) 2023    Generalized muscle weakness 2023    Hypertensive heart and chronic kidney disease with heart failure and stage 1 through stage 4 chronic kidney disease, or unspecified chronic kidney disease ([address]) 2023    Need for assistance with personal care 2023    Other abnormalities of gait and mobility 2023    Retention of urine, unspecified 2023    Sudden idiopathic hearing loss, right ear 2023    Acute and chronic respiratory failure with hypoxia ([address]) 2023    Elevated troponin 2023    Orthopnea 2023    Lower extremity edema 2023    Irregular heart rhythm 2023    Pneumonia of right lower lobe due to infectious organism 03/15/2023    Leukocytosis 2023    Left leg pain 2023    Transient right leg weakness 2023    Hypokalemia 2023    Ambulatory dysfunction 2023    COPD (chronic obstructive pulmonary disease) ([address]) 2023    Sensorineural hearing loss, bilateral 10/19/2022    Chronic respiratory failure with hypoxia ([address]) 10/15/2022    Prostate cancer ([address]) 2022    Elevated MCV 2022    Cubital tunnel syndrome on left 2022    Carpal tunnel syndrome on left 07/22/2022    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    Urinary frequency 04/27/2022    Incomplete bladder emptying 04/27/2022    Chronic bilateral low back pain with right-sided sciatica 04/18/2022    Mid back pain 04/18/2022    Thoracic radiculopathy 04/18/2022    Chronic pain syndrome 04/18/2022    Hoarseness or changing voice 04/14/2022    Chronic right-sided thoracic back pain 03/05/2022    Primary insomnia 03/02/2022    Right hip pain 01/20/2022    Abnormal nuclear stress test 03/18/2021    Costochondral chest pain 01/15/2021    COPD with acute exacerbation ([address]) 01/11/2021    Oxygen dependent 01/11/2021    S/P carotid endarterectomy 01/11/2021    Stented coronary artery 01/11/2021    Vertigo 01/11/2021    Anisometropia 01/11/2021    Osteoarthritis of spinal facet joint 01/11/2021    Chronic ischemic heart disease 01/11/2021    Diverticular disease of colon 01/11/2021    Dry eye syndrome 01/11/2021    GERD (gastroesophageal reflux disease) 01/11/2021    Acute hearing loss, right 01/11/2021    Elevated PSA 01/11/2021    Hypoxia 01/11/2021    Lens replaced by other means 01/11/2021    Lumbar radiculopathy 01/11/2021    Multinodular goiter 01/11/2021    Nuclear senile cataract 01/11/2021    Obesity 01/11/2021    Occlusion and stenosis of carotid artery 01/11/2021    Osteoarthritis of hip 01/11/2021    Prediabetes 01/11/2021    LAMBERTO on CPAP 01/11/2021    Solitary pulmonary nodule 01/11/2021    Thyroid nodule 01/11/2021    Guyon syndrome, 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  [address] [facility] [place] [address]  Phone: [phone] Fax: [phone] [address] [facility], [address] [facility] [place]  Phone: [phone] Fax: [phone]    Primary Care Provider: Odilia Kauffman DO    Primary Insurance: [facility]  Secondary Insurance:     DISCHARGE DETAILS:    Discharge planning discussed with[de-identified] cm called Nedra(daughter)  Freedom of Choice: Yes  Comments - Freedom of Choice: recommendation is for hhc- permission was given to send a blanket referral - daughter is aware I may need to call with choices tomorrow-  CM contacted family/caregiver?: Yes  Were Treatment Team discharge recommendations reviewed with patient/caregiver?: Yes  Did patient/caregiver verbalize understanding of patient care needs?: Yes  Were patient/caregiver advised of the risks associated with not following Treatment Team discharge recommendations?: Yes    Contacts  Patient Contacts: Sydney Raya  Relationship to Patient[de-identified] Family  Contact Method: Phone  Phone Number: [phone]  Reason/Outcome: Discharge [facility]         Is the patient interested in [facility] at discharge?: Yes    DME Referral Provided  Referral made for DME?: No    Other Referral/Resources/Interventions Provided:  Interventions: HHC  Referral Comments: referrals sent for hhc as requested- [facility] referral sent via godwin    Would you like to participate in our [address] [facility] [place] service program?  : No - Declined    Treatment Team Recommendation: Home with [address] (home with family & hhc & outpt follow up- family)  Discharge Destination Plan[de-identified] Home with [address] (home with family & hhc & outpt follow up)  Transport at Discharge : Automobile, Family      IMM reviewed with  daughter ,  daughter  agrees with discharge determination.        Pt & family are in agreement with the d/c & d/c plan   Family is aware cm will call tomorrow with [facility] choices              IMM Given (Date):: 10/27/23  IMM Given to[de-identified] Family (cm called the daughter & reviewed IMM- she stated she understood & copy was placed in the pt's room for her-)  Family notified[de-identified] daughter was called

## 2023-10-27 NOTE — PLAN OF CARE
Problem: MOBILITY - ADULT  Goal: Maintain or return to baseline ADL function  Description: INTERVENTIONS:  -  Assess patient's ability to carry out ADLs; assess patient's baseline for ADL function and identify physical deficits which impact ability to perform ADLs (bathing, care of mouth/teeth, toileting, grooming, dressing, etc.)  - Assess/evaluate cause of self-care deficits   - Assess range of motion  - Assess patient's mobility; develop plan if impaired  - Assess patient's need for assistive devices and provide as appropriate  - Encourage maximum independence but intervene and supervise when necessary  - Involve family in performance of ADLs  - Assess for home care needs following discharge   - Consider OT consult to assist with ADL evaluation and planning for discharge  - Provide patient education as appropriate  Outcome: Progressing  Goal: Maintains/Returns to pre admission functional level  Description: INTERVENTIONS:  - Perform BMAT or MOVE assessment daily.   - Set and communicate daily mobility goal to care team and patient/family/caregiver. - Collaborate with rehabilitation services on mobility goals if consulted  - Perform Range of Motion 3 times a day. - Reposition patient every 2 hours.   - Dangle patient 3 times a day  - Stand patient 3 times a day  - Ambulate patient 3 times a day  - Out of bed to chair 3 times a day   - Out of bed for meals 3 times a day  - Out of bed for toileting  - Record patient progress and toleration of activity level   Outcome: Progressing     Problem: PAIN - ADULT  Goal: Verbalizes/displays adequate comfort level or baseline comfort level  Description: Interventions:  - Encourage patient to monitor pain and request assistance  - Assess pain using appropriate pain scale  - Administer analgesics based on type and severity of pain and evaluate response  - Implement non-pharmacological measures as appropriate and evaluate response  - Consider cultural and social influences on pain and pain management  - Notify physician/advanced practitioner if interventions unsuccessful or patient reports new pain  Outcome: Progressing     Problem: INFECTION - ADULT  Goal: Absence or prevention of progression during hospitalization  Description: INTERVENTIONS:  - Assess and monitor for signs and symptoms of infection  - Monitor lab/diagnostic results  - Monitor all insertion sites, i.e. indwelling lines, tubes, and drains  - Monitor endotracheal if appropriate and nasal secretions for changes in amount and color  - [place] appropriate cooling/warming therapies per order  - Administer medications as ordered  - Instruct and encourage patient and family to use good hand hygiene technique  - Identify and instruct in appropriate isolation precautions for identified infection/condition  Outcome: Progressing     Problem: SAFETY ADULT  Goal: Maintain or return to baseline ADL function  Description: INTERVENTIONS:  -  Assess patient's ability to carry out ADLs; assess patient's baseline for ADL function and identify physical deficits which impact ability to perform ADLs (bathing, care of mouth/teeth, toileting, grooming, dressing, etc.)  - Assess/evaluate cause of self-care deficits   - Assess range of motion  - Assess patient's mobility; develop plan if impaired  - Assess patient's need for assistive devices and provide as appropriate  - Encourage maximum independence but intervene and supervise when necessary  - Involve family in performance of ADLs  - Assess for home care needs following discharge   - Consider OT consult to assist with ADL evaluation and planning for discharge  - Provide patient education as appropriate  Outcome: Progressing  Goal: Maintains/Returns to pre admission functional level  Description: INTERVENTIONS:  - Perform BMAT or MOVE assessment daily.   - Set and communicate daily mobility goal to care team and patient/family/caregiver.    - Collaborate with rehabilitation services on mobility goals if consulted  - Perform Range of Motion 3 times a day. - Reposition patient every 2 hours.   - Dangle patient 3 times a day  - Stand patient 3 times a day  - Ambulate patient 3 times a day  - Out of bed to chair 3 times a day   - Out of bed for meals 3 times a day  - Out of bed for toileting  - Record patient progress and toleration of activity level   Outcome: Progressing  Goal: Patient will remain free of falls  Description: INTERVENTIONS:  - Educate patient/family on patient safety including physical limitations  - Instruct patient to call for assistance with activity   - Consult OT/PT to assist with strengthening/mobility   - Keep Call bell within reach  - Keep bed low and locked with side rails adjusted as appropriate  - Keep care items and personal belongings within reach  - Initiate and maintain comfort rounds  - Make Fall Risk Sign visible to staff  - Offer Toileting every  Hours, in advance of need  - Initiate/Maintain alarm  - Obtain necessary fall risk management equipment:   - Apply yellow socks and bracelet for high fall risk patients  - Consider moving patient to room near nurses station  Outcome: Progressing     Problem: DISCHARGE PLANNING  Goal: Discharge to home or other facility with appropriate resources  Description: INTERVENTIONS:  - Identify barriers to discharge w/patient and caregiver  - Arrange for needed discharge resources and transportation as appropriate  - Identify discharge learning needs (meds, wound care, etc.)  - Arrange for interpretive services to assist at discharge as needed  - Refer to Case Management Department for coordinating discharge planning if the patient needs post-hospital services based on physician/advanced practitioner order or complex needs related to functional status, cognitive ability, or social support system  Outcome: Progressing     Problem: Knowledge Deficit  Goal: Patient/family/caregiver demonstrates understanding of disease process, treatment plan, medications, and discharge instructions  Description: Complete learning assessment and assess knowledge base.   Interventions:  - Provide teaching at level of understanding  - Provide teaching via preferred learning methods  Outcome: Progressing     Problem: Prexisting or High Potential for Compromised Skin Integrity  Goal: Skin integrity is maintained or improved  Description: INTERVENTIONS:  - Identify patients at risk for skin breakdown  - Assess and monitor skin integrity  - Assess and monitor nutrition and hydration status  - Monitor labs   - Assess for incontinence   - Turn and reposition patient  - Assist with mobility/ambulation  - Relieve pressure over bony prominences  - Avoid friction and shearing  - Provide appropriate hygiene as needed including keeping skin clean and dry  - Evaluate need for skin moisturizer/barrier cream  - Collaborate with interdisciplinary team   - Patient/family teaching  - Consider wound care consult   Outcome: Progressing

## 2023-10-27 NOTE — RESPIRATORY THERAPY NOTE
10/26/23 0922   Respiratory Assessment   Resp Comments h2o chamber filled, 4L oxygen inline. pts own preset machine. no help needed.  will continue to monitor pt   O2 Device cpap   Non-Invasive Information   O2 Interface Device Nasal mask   Non-Invasive Ventilation Mode CPAP   $ Pulse Oximetry Spot Check Charge Completed   Non-Invasive Settings   FiO2 (%)   (with 4L)   PEEP/CPAP (cm H2O)   (preset)

## 2023-10-27 NOTE — PLAN OF CARE
Problem: MOBILITY - ADULT  Goal: Maintain or return to baseline ADL function  Description: INTERVENTIONS:  -  Assess patient's ability to carry out ADLs; assess patient's baseline for ADL function and identify physical deficits which impact ability to perform ADLs (bathing, care of mouth/teeth, toileting, grooming, dressing, etc.)  - Assess/evaluate cause of self-care deficits   - Assess range of motion  - Assess patient's mobility; develop plan if impaired  - Assess patient's need for assistive devices and provide as appropriate  - Encourage maximum independence but intervene and supervise when necessary  - Involve family in performance of ADLs  - Assess for home care needs following discharge   - Consider OT consult to assist with ADL evaluation and planning for discharge  - Provide patient education as appropriate  Outcome: Adequate for Discharge  Goal: Maintains/Returns to pre admission functional level  Description: INTERVENTIONS:  - Perform BMAT or MOVE assessment daily.   - Set and communicate daily mobility goal to care team and patient/family/caregiver. - Collaborate with rehabilitation services on mobility goals if consulted  - Perform Range of Motion 3 times a day. - Reposition patient every 2 hours.   - Dangle patient 3 times a day  - Stand patient 3 times a day  - Ambulate patient 3 times a day  - Out of bed to chair 3 times a day   - Out of bed for meals 3 times a day  - Out of bed for toileting  - Record patient progress and toleration of activity level   Outcome: Adequate for Discharge     Problem: PAIN - ADULT  Goal: Verbalizes/displays adequate comfort level or baseline comfort level  Description: Interventions:  - Encourage patient to monitor pain and request assistance  - Assess pain using appropriate pain scale  - Administer analgesics based on type and severity of pain and evaluate response  - Implement non-pharmacological measures as appropriate and evaluate response  - Consider cultural and social influences on pain and pain management  - Notify physician/advanced practitioner if interventions unsuccessful or patient reports new pain  Outcome: Adequate for Discharge     Problem: INFECTION - ADULT  Goal: Absence or prevention of progression during hospitalization  Description: INTERVENTIONS:  - Assess and monitor for signs and symptoms of infection  - Monitor lab/diagnostic results  - Monitor all insertion sites, i.e. indwelling lines, tubes, and drains  - Monitor endotracheal if appropriate and nasal secretions for changes in amount and color  - [place] appropriate cooling/warming therapies per order  - Administer medications as ordered  - Instruct and encourage patient and family to use good hand hygiene technique  - Identify and instruct in appropriate isolation precautions for identified infection/condition  Outcome: Adequate for Discharge     Problem: SAFETY ADULT  Goal: Maintain or return to baseline ADL function  Description: INTERVENTIONS:  -  Assess patient's ability to carry out ADLs; assess patient's baseline for ADL function and identify physical deficits which impact ability to perform ADLs (bathing, care of mouth/teeth, toileting, grooming, dressing, etc.)  - Assess/evaluate cause of self-care deficits   - Assess range of motion  - Assess patient's mobility; develop plan if impaired  - Assess patient's need for assistive devices and provide as appropriate  - Encourage maximum independence but intervene and supervise when necessary  - Involve family in performance of ADLs  - Assess for home care needs following discharge   - Consider OT consult to assist with ADL evaluation and planning for discharge  - Provide patient education as appropriate  Outcome: Adequate for Discharge  Goal: Maintains/Returns to pre admission functional level  Description: INTERVENTIONS:  - Perform BMAT or MOVE assessment daily.   - Set and communicate daily mobility goal to care team and patient/family/caregiver. - Collaborate with rehabilitation services on mobility goals if consulted  - Perform Range of Motion 3 times a day. - Reposition patient every 2 hours.   - Dangle patient 3 times a day  - Stand patient 3 times a day  - Ambulate patient 3 times a day  - Out of bed to chair 3 times a day   - Out of bed for meals 3 times a day  - Out of bed for toileting  - Record patient progress and toleration of activity level   Outcome: Adequate for Discharge  Goal: Patient will remain free of falls  Description: INTERVENTIONS:  - Educate patient/family on patient safety including physical limitations  - Instruct patient to call for assistance with activity   - Consult OT/PT to assist with strengthening/mobility   - Keep Call bell within reach  - Keep bed low and locked with side rails adjusted as appropriate  - Keep care items and personal belongings within reach  - Initiate and maintain comfort rounds  - Make Fall Risk Sign visible to staff  - Apply yellow socks and bracelet for high fall risk patients  - Consider moving patient to room near nurses station  Outcome: Adequate for Discharge     Problem: DISCHARGE PLANNING  Goal: Discharge to home or other facility with appropriate resources  Description: INTERVENTIONS:  - Identify barriers to discharge w/patient and caregiver  - Arrange for needed discharge resources and transportation as appropriate  - Identify discharge learning needs (meds, wound care, etc.)  - Arrange for interpretive services to assist at discharge as needed  - Refer to Case Management Department for coordinating discharge planning if the patient needs post-hospital services based on physician/advanced practitioner order or complex needs related to functional status, cognitive ability, or social support system  Outcome: Adequate for Discharge     Problem: Knowledge Deficit  Goal: Patient/family/caregiver demonstrates understanding of disease process, treatment plan, medications, and discharge instructions  Description: Complete learning assessment and assess knowledge base.   Interventions:  - Provide teaching at level of understanding  - Provide teaching via preferred learning methods  Outcome: Adequate for Discharge     Problem: Prexisting or High Potential for Compromised Skin Integrity  Goal: Skin integrity is maintained or improved  Description: INTERVENTIONS:  - Identify patients at risk for skin breakdown  - Assess and monitor skin integrity  - Assess and monitor nutrition and hydration status  - Monitor labs   - Assess for incontinence   - Turn and reposition patient  - Assist with mobility/ambulation  - Relieve pressure over bony prominences  - Avoid friction and shearing  - Provide appropriate hygiene as needed including keeping skin clean and dry  - Evaluate need for skin moisturizer/barrier cream  - Collaborate with interdisciplinary team   - Patient/family teaching  - Consider wound care consult   Outcome: Adequate for Discharge

## 2023-10-27 NOTE — PROGRESS NOTES
Progress Note - [facility] Infectious Disease   Ge Platt 77 y.o. male MRN: [id number]  Unit/Bed#: -01 Encounter: [id number]      IMPRESSION & RECOMMENDATIONS:   1. Polymicrobial bacteremia. In only 1 of 3 sets with 2 different organisms isolated. This could represent a contaminant, though suspect transient bacteremia associated with pneumonia. Moraxella C&S sent to reference lab for confirmation and susceptibility testing. Patient remains stable without any clinical sepsis. Leukocytosis confounded by steroids. Repeat blood cultures are negative.   -Transition to PO cefpodoxime 400mg BID to complete 7 day course through 10/29   -Continue to follow-up repeat blood cultures  -Follow up moraxella susceptibilities - office notified     2. Possible community-acquired pneumonia. With equivocal findings noted on CT of the chest, and with a repeatedly normal procalcitonin level.  -Antibiotics as above for now     3. Acute on chronic respiratory failure. All in the setting of COPD exacerbation ans suspected pneumonia. -Monitor respiratory status  -Oxygen support  -Steroids and breathing treatments  -Close pulmonary follow-up     4. COPD. O2 dependent with a possible acute exacerbation. Her status improved with steroids and breathing treatments and antibiotics. 5. Acute on chronic combined systolic and diastolic congestive heart failure. Antibiotics:  D5 IV ceftriaxone     I have discussed the above management plan in detail with patient. I have discussed the above management plan in detail with patient's RN, and the primary service, SLIM attending. 24 Hour events:  Yesterday and overnight notes reviewed. Plan for discharge today. Subjective:  Patient has no fever, chills, sweats; no nausea, vomiting, diarrhea; no cough, shortness of breath; no pain. No new symptoms.     Objective:  Vitals:  Temp:  [96.6 °F (35.9 °C)-97.7 °F (36.5 °C)] 96.6 °F (35.9 °C)  HR:  [85-99] 96  Resp:  [18-20] 18  BP: (111-120)/(52-64) 120/52  SpO2:  [93 %-100 %] 97 %  Temp (24hrs), Av.3 °F (36.3 °C), Min:96.6 °F (35.9 °C), Max:97.7 °F (36.5 °C)  Current: Temperature: (!) 96.6 °F (35.9 °C)    PHYSICAL EXAM:  General Appearance:  Appearing well, nontoxic, and in no distress   HEENT: Normocephalic, without obvious abnormality, atraumatic. Conjunctiva pink and sclera anicteric. Oropharynx moist without lesions. [place]. Lungs:   Clear to auscultation bilaterally, respirations unlabored on 4 L NC    Heart:  RRR; no murmur, rub or gallop   Abdomen:   Soft, non-tender, non-distended, positive bowel sounds    Extremities: No cyanosis, clubbing or edema   Musculoskeletal: Back symmetric without curvature, ROM normal.    Skin: No rashes or lesions. No draining wounds noted. Peripheral IV intact without evidence of erythema, warmth, or exudate. LABS, IMAGING, & OTHER STUDIES:  Extensive review of the medical records in epic including review of the notes, radiographs, and laboratory results were reviewed personally as below. Lab Results:  I have personally reviewed pertinent labs. Results from last 7 days   Lab Units 10/27/23  0535 10/26/23  0511 10/25/23  0525   WBC Thousand/uL 18.62* 16.17* 12.38*   HEMOGLOBIN g/dL 15.5 13.8 13.5   PLATELETS Thousands/uL 253 241 229     Results from last 7 days   Lab Units 10/26/23  0511 10/25/23  0525 10/24/23  0520   SODIUM mmol/L 141 141 141   POTASSIUM mmol/L 3.9 3.9 4.2   CHLORIDE mmol/L 98 99 98   CO2 mmol/L 37* 38* 36*   BUN mg/dL 29* 31* 33*   CREATININE mg/dL 1.03 0.95 1.15   EGFR ml/min/1.73sq m 68 75 60   CALCIUM mg/dL 8.3* 8.4 8.6   AST U/L  --  18  --    ALT U/L  --  28  --    ALK PHOS U/L  --  49  --      Results from last 7 days   Lab Units 10/23/23  1736   BLOOD CULTURE  No Growth at 72 hrs. No Growth at 72 hrs. Imaging Studies:   I have personally reviewed pertinent imaging study reports and images in PACS.     Other Studies:   I have personally reviewed pertinent report including blood cultures.

## 2023-10-27 NOTE — ASSESSMENT & PLAN NOTE
Lab Results   Component Value Date    EGFR 68 10/26/2023    EGFR 75 10/25/2023    EGFR 60 10/24/2023    CREATININE 1.03 10/26/2023    CREATININE 0.95 10/25/2023    CREATININE 1.15 10/24/2023     Creatinine appears to be around baseline.   Continue routine lab monitoring with PCP as outpatient

## 2023-10-27 NOTE — PROGRESS NOTES
Pastoral Care Progress Note    10/27/2023  Patient: Jyotsna Mederos : 1945  Admission Date & Time: 10/18/2023 1138  MRN: [id number] CSN: [id number]    [facility] initiated support visit to pt. Pt received CH upright in chair, no family present. Pt initially presented as angry and frustrated that the [facility] was "speaking quietly". [facility] and pt came to an understanding regarding pt's communication methods of choice, on the white board provided. Pt shared about his disappointments during this admission. Pt shared his disappointments regarding what he perceives is a lack of resolution of his health condition. Pt shared he believes he will be discharged today. Pt shared that he is well supported at home by his family. Pt did not identify and unmanageable spiritual or emotional distress. [facility] remains available.              Chaplaincy Interventions Utilized:   Exploration: Explored relational needs & resources    Relationship Building: Listened empathically      Chaplaincy Outcomes Achieved:  Expressed intermediate hope      Spiritual Coping Strategies Utilized:   Connectedness     10/27/23 1100   Clinical Encounter Type   Visited With Patient   Routine Visit Introduction

## 2023-10-27 NOTE — DISCHARGE SUMMARY
[address] [facility]  Discharge- Berny Salmon 1945, 77 y.o. male MRN: [id number]  Unit/Bed#: -[facility] Encounter: [id number]  Primary Care Provider: Ysabel Ronquillo DO   Date and time admitted to hospital: 10/18/2023 11:38 AM    Pneumonia of right lower lobe due to infectious organism  Assessment & Plan  19-year-old male patient with COPD on 3 L/min of oxygen presents with increased productive cough, shortness of breath, wheezing and increased O2 requirements up to 4L NC    CXR in ER showing New bilateral lower lung pneumonia. Received ceftriaxone and and azithromycin on admission    Blood cultures from admission 1 set positive for gram-negative rods and gram-positive cocci. Bacteria identified as Moraxella. Infectious disease input appreciated. Repeat blood cultures negative to date. Patient was maintained on IV ceftriaxone while in the hospital.  Discharged on Vantin per infectious disease recommendation    Advised to follow-up with pulmonology as outpatient in 2 to 3 weeks      * COPD with acute exacerbation [facility])  Assessment & Plan  Presents with increased work of breathing wheezing and coughing  Suspect patient is in COPD exacerbation secondary to pneumonia  Received Solu-Medrol 125 mg in the ER and DuoNeb treatment  Patient improved with IV steroids.   Now on oral prednisone, will continue to taper  Finished 3 days of Azithromycin  Continue home nebulizers    Pulmonology input appreciated    Acute systolic congestive heart failure (HCC)  Assessment & Plan  Wt Readings from Last 3 Encounters:   10/27/23 79.5 kg (175 lb 4.3 oz)   10/02/23 82.9 kg (182 lb 12.8 oz)   09/29/23 82.1 kg (181 lb)   Patient has been transitioned to oral Lasix  Cardiology input appreciated  Entresto discontinued due to low blood pressures  Continue current cardiac medications  Follow-up with PCP and cardiology as outpatient    Chronic kidney disease, stage 3a [facility])  Assessment & Plan  Lab Results Component Value Date    EGFR 68 10/26/2023    EGFR 75 10/25/2023    EGFR 60 10/24/2023    CREATININE 1.03 10/26/2023    CREATININE 0.95 10/25/2023    CREATININE 1.15 10/24/2023     Creatinine appears to be around baseline. Continue routine lab monitoring with PCP as outpatient    Chronic respiratory failure with hypoxia (HCC)  Assessment & Plan  Chronic respiratory failure with hypoxia due to chronic COPD  Patient back to home oxygen requirement of 3 L nasal cannula    LAMBERTO on CPAP  Assessment & Plan  Continue cpap qhs    Coronary artery disease involving native coronary artery of native heart without angina pectoris  Assessment & Plan  Continue home aspirin 81 mg daily, Toprol 12.5 mg daily, and resume home Crestor 40 mg daily      Discharging Physician / Practitioner: Vaishali Carrera MD  PCP: Jerrod Gama DO  Admission Date:   Admission Orders (From admission, onward)       Ordered        10/19/23 1051  Inpatient Admission  Once            10/18/23 1410  Place in Observation  Once                          Discharge Date: 10/27/23    Medical Problems       Resolved Problems  Date Reviewed: 10/22/2023            Resolved    Hypertension 10/26/2023     Resolved by  Turner Josue MD          Consultations During Hospital Stay:  Pulmonology, infectious disease, cardiology    Procedures Performed:   None    Significant Findings / Test Results:   Pneumonia, bacteremia, COPD exacerbation    Incidental Findings:   None    Test Results Pending at Discharge (will require follow up): None     Outpatient Tests Requested:  Routine labs with PCP as outpatient    Complications:    None    Reason for Admission: Bacteremia    Hospital Course:     Gail Ng is a 77 y.o. male patient who originally presented to the hospital on 10/18/2023 due to shortness of breath. Patient admitted with pneumonia and COPD exacerbation.   Patient was started on IV Solu-Medrol as well as antibiotics with ceftriaxone and Zithromax. Also received IV Lasix for possible component of fluid overload. Cardiology and pulmonology were eventually consulted. Cardiology recommended discontinuing Entresto due to hypotension. Patient was maintained on metoprolol and was diuresed as tolerated. Pulmonology recommended continuing steroids and following up with infectious disease regarding positive blood cultures. Infectious disease was consulted due to bacteremia. Patient was continued on ceftriaxone. Blood culture was positive for Moraxella. Patient was eventually transition to [facility] in anticipation of discharge. Oxygen was titrated back down to home oxygen requirement of 3 L nasal cannula. Patient transition to oral prednisone. Patient has clinically improved and is stable for discharge home with outpatient follow-up. Patient discharged on prednisone taper and Vantin to complete course of therapy. Patient was complaining of some right-sided chest wall pain. X-ray obtained, slight worsening in infiltrate however patient will be discharged on oral antibiotics and will need repeat imaging in 4 to 6-week to evaluate for clearance. Patient saturating on home oxygen saturation. Heart rate within normal limits. Continue pain control as needed. Follow-up with PCP as outpatient. Advised to return with any worsening symptoms. Please see above list of diagnoses and related plan for additional information. Condition at Discharge: stable     Discharge Day Visit / Exam:     Subjective: Patient states he is feeling better today    Vitals: Blood Pressure: 98/62 (10/27/23 1533)  Pulse: 97 (10/27/23 1533)  Temperature: 97.5 °F (36.4 °C) (10/27/23 1533)  Temp Source: Oral (10/26/23 2300)  Respirations: 18 (10/27/23 1533)  Height: 5' 8" (172.7 cm) (10/18/23 1627)  Weight - Scale: 79.5 kg (175 lb 4.3 oz) (10/27/23 0530)  SpO2: 93 % (10/27/23 1533)  Exam:   Physical Exam  Constitutional:       General: He is not in acute distress.   HENT: Head: Normocephalic and atraumatic. Ears:      Comments: Hard of hearing     Nose: Nose normal.      Mouth/Throat:      Mouth: Mucous membranes are moist.   Eyes:      Extraocular Movements: Extraocular movements intact. Conjunctiva/sclera: Conjunctivae normal.   Cardiovascular:      Rate and Rhythm: Normal rate and regular rhythm. Pulmonary:      Effort: Pulmonary effort is normal. No respiratory distress. Breath sounds: Rhonchi present. Abdominal:      Palpations: Abdomen is soft. Tenderness: There is no abdominal tenderness. Musculoskeletal:         General: Normal range of motion. Cervical back: Normal range of motion and neck supple. Comments: Generalized weakness   Skin:     General: Skin is warm and dry. Neurological:      General: No focal deficit present. Mental Status: He is alert. Mental status is at baseline. Cranial Nerves: No cranial nerve deficit. Psychiatric:         Mood and Affect: Mood normal.         Behavior: Behavior normal.       Discussion with Family: Spoke with patient's wife over the phone regarding discharge plan    Discharge instructions/Information to patient and family:   See after visit summary for information provided to patient and family. Provisions for Follow-Up Care:  See after visit summary for information related to follow-up care and any pertinent home health orders. Disposition:     Home with [facility] (Reminder: Complete face to face encounter)      Planned Readmission:   no     Discharge Statement:  I spent 35 minutes discharging the patient. This time was spent on the day of discharge. I had direct contact with the patient on the day of discharge. Greater than 50% of the total time was spent examining patient, answering all patient questions, arranging and discussing plan of care with patient as well as directly providing post-discharge instructions.   Additional time then spent on discharge activities. Discharge Medications:  See after visit summary for reconciled discharge medications provided to patient and family.       ** Please Note: This note has been constructed using a voice recognition system **

## 2023-10-27 NOTE — ASSESSMENT & PLAN NOTE
Wt Readings from Last 3 Encounters:   10/27/23 79.5 kg (175 lb 4.3 oz)   10/02/23 82.9 kg (182 lb 12.8 oz)   09/29/23 82.1 kg (181 lb)   Patient has been transitioned to oral Lasix  Cardiology input appreciated  Entresto discontinued due to low blood pressures  Continue current cardiac medications  Follow-up with PCP and cardiology as outpatient

## 2023-10-27 NOTE — SPEECH THERAPY NOTE
Speech Language/Pathology  Speech/Language Pathology Progress Note    Patient Name: Criselda Maldonado  OBIVH'T Date: 10/27/2023     Problem List  Principal Problem:    COPD with acute exacerbation [facility])  Active Problems:    Coronary artery disease involving native coronary artery of native heart without angina pectoris    LAMBERTO on CPAP    Chronic respiratory failure with hypoxia ([address])    Pneumonia of right lower lobe due to infectious organism    Chronic kidney disease, stage 3a ([address])    Acute systolic congestive heart failure ([address])    Past Medical History  Past Medical History:   Diagnosis Date    Abnormal ECG 2021 OR 2022    Alcoholism ([address]) 1984    sober 38 years    Arthritis 2008    beginning    Bilateral carotid artery stenosis     Callus     Cancer (HCC)     skin    Chronic diastolic heart failure (HCC)     Chronic ischemic heart disease     Chronic kidney disease     stage 3    Colon polyp     COPD (chronic obstructive pulmonary disease) (HCC)     Coronary artery disease     hx stents, MI, PCI    COVID 11/2021    CPAP (continuous positive airway pressure) dependence     Disease of thyroid gland 2017    nodules    Emphysema of lung ([address]) 1995    started    Hearing loss     History of transfusion 1995    Hyperlipidemia     Hypertension     Intracranial aneurysm 04/25/2023    Lung nodule 2023    x rays    MI (myocardial infarction) ([address]) 1995    Myocardial infarction ([address]) 1995    Pneumonia     Pneumothorax 02/20/2023    collapsed lung    Prostate cancer ([address])     RSV (respiratory syncytial virus infection) 10/2022    S/P carotid endarterectomy     Shortness of breath     O2 2 l/nc PRN    Sleep apnea     Sleep apnea, obstructive     Stented coronary artery      Past Surgical History  Past Surgical History:   Procedure Laterality Date    APPENDECTOMY      CARDIAC CATHETERIZATION  03/18/2021    left main with no significant disease, proximal LAD with 10% stenosis at the site of prior stent, left circumflex artery with minimal luminal irregularities mid RCA with 50% stenosis at site of prior stent and PL segment with distal disease supplied by collaterals from the distal circumflex with no significant CAD requiring revascularization at that time. CATARACT EXTRACTION, BILATERAL Bilateral     COLONOSCOPY      CORONARY ANGIOPLASTY WITH STENT PLACEMENT  1999    RCA    CORONARY ANGIOPLASTY WITH STENT PLACEMENT  2001    RCA    CORONARY ANGIOPLASTY WITH STENT PLACEMENT  2003    LAD    EYE SURGERY      RI BX PROSTATE STRTCTC SATURATION SAMPLING IMG GID N/A 09/13/2022    Procedure: TRANSPERINEAL MRI FUSION  BIOPSY PROSTATE;  Surgeon: Mc Meza MD;  Location: [facility];  Service: Urology    RI NEUROPLASTY &/TRANSPOS MEDIAN NRV CARPAL Zandra Sung Left 07/22/2022    Procedure: RELEASE CARPAL TUNNEL;  Surgeon: Levon Tuttle MD;  Location: [facility];  Service: Orthopedics    RI NEUROPLASTY &/TRANSPOSITION ULNAR NERVE ELBOW Left 07/22/2022    Procedure: RELEASE CUBITAL TUNNEL;  Surgeon: Levon Tuttle MD;  Location[facility];  Service: Orthopedics    RI NEUROPLASTY &/TRANSPOSITION ULNAR NERVE WRIST Left 07/22/2022    Procedure: Jessenia Waterloo RELEASE;  Surgeon: Levon Tuttle MD;  Location: [facility];  Service: Orthopedics    SKIN CANCER EXCISION  2012    chin-per pt, basal cell  also right ankle    TONSILLECTOMY  no         Subjective:  Pt received sitting upright in bedside chair.   Pt with congested cough at this time, very difficult to clear mucus pt states "it doesn't come up always."    Objective:  ***    Assessment:  ***    Plan/Recommendations:  ***

## 2023-10-28 NOTE — CASE MANAGEMENT
Case Management Discharge Planning Note    Patient name Miranda Schwartz  Location /-01 MRN [id number]  : 1945 Date 10/28/2023       Current Admission Date: 10/18/2023  Current Admission Diagnosis:COPD with acute exacerbation [facility])   Patient Active Problem List    Diagnosis Date Noted    Acute systolic congestive heart failure ([address]) 10/22/2023    Erectile dysfunction 2023    Other disorders of refraction 2023    Occlusion and stenosis of unspecified carotid artery 2023    Dysphagia 2023    Congestive heart failure with left ventricular systolic dysfunction (LVSD) ([address]) 2023    Hypoxemia 2023    Intracranial aneurysm 2023    Allergic rhinitis, unspecified 2023    Chronic kidney disease, stage 3a ([address]) 2023    Generalized muscle weakness 2023    Hypertensive heart and chronic kidney disease with heart failure and stage 1 through stage 4 chronic kidney disease, or unspecified chronic kidney disease ([address]) 2023    Need for assistance with personal care 2023    Other abnormalities of gait and mobility 2023    Retention of urine, unspecified 2023    Sudden idiopathic hearing loss, right ear 2023    Acute and chronic respiratory failure with hypoxia ([address]) 2023    Elevated troponin 2023    Orthopnea 2023    Lower extremity edema 2023    Irregular heart rhythm 2023    Pneumonia of right lower lobe due to infectious organism 03/15/2023    Leukocytosis 2023    Left leg pain 2023    Transient right leg weakness 2023    Hypokalemia 2023    Ambulatory dysfunction 2023    COPD (chronic obstructive pulmonary disease) ([address]) 2023    Sensorineural hearing loss, bilateral 10/19/2022    Chronic respiratory failure with hypoxia ([address]) 10/15/2022    Prostate cancer ([address]) 2022    Elevated MCV 2022    Cubital tunnel syndrome on left 2022    Carpal tunnel syndrome on left 07/22/2022    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    Urinary frequency 04/27/2022    Incomplete bladder emptying 04/27/2022    Chronic bilateral low back pain with right-sided sciatica 04/18/2022    Mid back pain 04/18/2022    Thoracic radiculopathy 04/18/2022    Chronic pain syndrome 04/18/2022    Hoarseness or changing voice 04/14/2022    Chronic right-sided thoracic back pain 03/05/2022    Primary insomnia 03/02/2022    Right hip pain 01/20/2022    Abnormal nuclear stress test 03/18/2021    Costochondral chest pain 01/15/2021    COPD with acute exacerbation ([address]) 01/11/2021    Oxygen dependent 01/11/2021    S/P carotid endarterectomy 01/11/2021    Stented coronary artery 01/11/2021    Vertigo 01/11/2021    Anisometropia 01/11/2021    Osteoarthritis of spinal facet joint 01/11/2021    Chronic ischemic heart disease 01/11/2021    Diverticular disease of colon 01/11/2021    Dry eye syndrome 01/11/2021    GERD (gastroesophageal reflux disease) 01/11/2021    Acute hearing loss, right 01/11/2021    Elevated PSA 01/11/2021    Hypoxia 01/11/2021    Lens replaced by other means 01/11/2021    Lumbar radiculopathy 01/11/2021    Multinodular goiter 01/11/2021    Nuclear senile cataract 01/11/2021    Obesity 01/11/2021    Occlusion and stenosis of carotid artery 01/11/2021    Osteoarthritis of hip 01/11/2021    Prediabetes 01/11/2021    LAMBERTO on CPAP 01/11/2021    Solitary pulmonary nodule 01/11/2021    Thyroid nodule 01/11/2021    Guyon syndrome, 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   [address] [facility] [place] [address]  Phone: [phone] Fax: [phone] [address] [facility], [address] [place]  Phone: [phone] Fax: [phone]    Primary Care Provider: Odilia Kauffman DO    Primary Insurance: [facility]  Secondary Insurance:     DISCHARGE DETAILS:    Discharge planning discussed with[de-identified] daughter was called at 14:04pm  Freedom of Choice: Yes  Comments - Freedom of Choice: recommendation was for [facility] - list was reviewed over [facility] phone- their choice is [facility]- pt had in the past  CM contacted family/caregiver?: Yes  Were Treatment Team discharge recommendations reviewed with patient/caregiver?: Yes  Did patient/caregiver verbalize understanding of patient care needs?: Yes  Were patient/caregiver advised of the risks associated with not following Treatment Team discharge recommendations?: Yes    Contacts  Patient Contacts: Sydney Raya  Relationship to Patient[de-identified] Family (daughter)  Contact Method: Phone  Phone Number: [phone]  Reason/Outcome: Discharge [facility]         Is the patient interested in [facility] at discharge?: Yes         Other Referral/Resources/Interventions Provided:  Interventions: C  Referral Comments: cm faxed avs to [facility]- family &pt;s choice- he had them in the past    Would you like to participate in our [facility] service program?  : No - Declined    Treatment Team Recommendation: Home with [address] (home with family & [facility] & outpt follow up- family)  Discharge Destination Plan[de-identified] Home with [address] (home with family & [facility] out[facility] follow up)      Daughter was called with choices- her choice was [facility]- pt had in th past - she was given their telephone # and AVS was faxed.

## 2023-10-29 LAB
BACTERIA BLD CULT: NORMAL
BACTERIA BLD CULT: NORMAL

## 2023-10-30 ENCOUNTER — APPOINTMENT (OUTPATIENT)
Dept: PULMONOLOGY | Facility: HOSPITAL | Age: 78
End: 2023-10-30
Attending: INTERNAL MEDICINE
Payer: COMMERCIAL

## 2023-10-30 NOTE — UTILIZATION REVIEW
NOTIFICATION OF ADMISSION DISCHARGE   This is a Notification of Discharge from [address] [facility]. Please be advised that this patient has been discharge from our facility. Below you will find the admission and discharge date and time including the patient’s disposition. UTILIZATION REVIEW CONTACT:  Luciana Mendoza  Utilization   Network Utilization Review Department  Phone: [phone] carefully listen to the prompts. All voicemails are confidential.  Email: [email or web address]     ADMISSION INFORMATION  PRESENTATION DATE: 10/18/2023 11:38 AM  OBERVATION ADMISSION DATE:   INPATIENT ADMISSION DATE: 10/19/23 10:51 AM   DISCHARGE DATE: 10/27/2023  6:57 PM   DISPOSITION:Home with [facility] Utilization Review Department  ATTENTION: Please call with any questions or concerns to [phone] and carefully listen to the prompts so that you are directed to the right person. All voicemails are confidential.   For Discharge needs, contact Care Management DC Support Team at [phone] opt. 2  Send all requests for admission clinical reviews, approved or denied determinations and any other requests to dedicated fax number below belonging to the campus where the patient is receiving treatment.  List of dedicated fax numbers for the Facilities:  [place] DENIALS (Administrative/Medical Necessity) [phone]   DISCHARGE SUPPORT TEAM (Network) [phone] [facility]/Pediatric[facility]) [phone]   [address] [facility] [address] [place] [address] [address]  [phone]

## 2023-10-31 ENCOUNTER — TRANSITIONAL CARE MANAGEMENT (OUTPATIENT)
Dept: FAMILY MEDICINE CLINIC | Facility: CLINIC | Age: 78
End: 2023-10-31

## 2023-10-31 DIAGNOSIS — J44.9 CHRONIC OBSTRUCTIVE PULMONARY DISEASE, UNSPECIFIED COPD TYPE (HCC): Primary | ICD-10-CM

## 2023-10-31 NOTE — TELEPHONE ENCOUNTER
Patient requesting refill(s) of: azithromycin 250 mg tablet    Last filled: Historical  Last appt:9/1/23  Next appt:11/1/23  Pharmacy: [facility]

## 2023-11-01 ENCOUNTER — APPOINTMENT (OUTPATIENT)
Dept: PULMONOLOGY | Facility: HOSPITAL | Age: 78
End: 2023-11-01
Attending: INTERNAL MEDICINE
Payer: COMMERCIAL

## 2023-11-01 ENCOUNTER — OFFICE VISIT (OUTPATIENT)
Dept: FAMILY MEDICINE CLINIC | Facility: CLINIC | Age: 78
End: 2023-11-01
Payer: COMMERCIAL

## 2023-11-01 ENCOUNTER — APPOINTMENT (OUTPATIENT)
Dept: LAB | Facility: CLINIC | Age: 78
End: 2023-11-01
Payer: COMMERCIAL

## 2023-11-01 VITALS
RESPIRATION RATE: 18 BRPM | SYSTOLIC BLOOD PRESSURE: 92 MMHG | WEIGHT: 178 LBS | OXYGEN SATURATION: 94 % | DIASTOLIC BLOOD PRESSURE: 54 MMHG | HEART RATE: 69 BPM | HEIGHT: 68 IN | TEMPERATURE: 97.1 F | BODY MASS INDEX: 26.98 KG/M2

## 2023-11-01 DIAGNOSIS — R09.02 HYPOXIA: ICD-10-CM

## 2023-11-01 DIAGNOSIS — R13.10 DYSPHAGIA, UNSPECIFIED TYPE: ICD-10-CM

## 2023-11-01 DIAGNOSIS — N18.31 CHRONIC KIDNEY DISEASE, STAGE 3A (HCC): ICD-10-CM

## 2023-11-01 DIAGNOSIS — J44.1 COPD WITH ACUTE EXACERBATION (HCC): Primary | ICD-10-CM

## 2023-11-01 DIAGNOSIS — J96.11 CHRONIC RESPIRATORY FAILURE WITH HYPOXIA (HCC): ICD-10-CM

## 2023-11-01 DIAGNOSIS — I50.22 CHRONIC SYSTOLIC CONGESTIVE HEART FAILURE (HCC): ICD-10-CM

## 2023-11-01 DIAGNOSIS — J18.9 PNEUMONIA OF RIGHT LOWER LOBE DUE TO INFECTIOUS ORGANISM: ICD-10-CM

## 2023-11-01 LAB
ALBUMIN SERPL BCP-MCNC: 3.8 G/DL (ref 3.5–5)
ALP SERPL-CCNC: 58 U/L (ref 34–104)
ALT SERPL W P-5'-P-CCNC: 33 U/L (ref 7–52)
ANION GAP SERPL CALCULATED.3IONS-SCNC: 13 MMOL/L
ANISOCYTOSIS BLD QL SMEAR: PRESENT
AST SERPL W P-5'-P-CCNC: 27 U/L (ref 13–39)
BASOPHILS # BLD MANUAL: 0 THOUSAND/UL (ref 0–0.1)
BASOPHILS NFR MAR MANUAL: 0 % (ref 0–1)
BILIRUB SERPL-MCNC: 0.83 MG/DL (ref 0.2–1)
BUN SERPL-MCNC: 26 MG/DL (ref 5–25)
CALCIUM SERPL-MCNC: 8.7 MG/DL (ref 8.4–10.2)
CHLORIDE SERPL-SCNC: 98 MMOL/L (ref 96–108)
CO2 SERPL-SCNC: 31 MMOL/L (ref 21–32)
CREAT SERPL-MCNC: 1.12 MG/DL (ref 0.6–1.3)
EOSINOPHIL # BLD MANUAL: 0 THOUSAND/UL (ref 0–0.4)
EOSINOPHIL NFR BLD MANUAL: 0 % (ref 0–6)
ERYTHROCYTE [DISTWIDTH] IN BLOOD BY AUTOMATED COUNT: 15.9 % (ref 11.6–15.1)
GFR SERPL CREATININE-BSD FRML MDRD: 62 ML/MIN/1.73SQ M
GLUCOSE P FAST SERPL-MCNC: 87 MG/DL (ref 65–99)
HCT VFR BLD AUTO: 47.8 % (ref 36.5–49.3)
HGB BLD-MCNC: 14.7 G/DL (ref 12–17)
LYMPHOCYTES # BLD AUTO: 0.91 THOUSAND/UL (ref 0.6–4.47)
LYMPHOCYTES # BLD AUTO: 5 % (ref 14–44)
MACROCYTES BLD QL AUTO: PRESENT
MCH RBC QN AUTO: 30.8 PG (ref 26.8–34.3)
MCHC RBC AUTO-ENTMCNC: 30.8 G/DL (ref 31.4–37.4)
MCV RBC AUTO: 100 FL (ref 82–98)
MONOCYTES # BLD AUTO: 0.36 THOUSAND/UL (ref 0–1.22)
MONOCYTES NFR BLD: 2 % (ref 4–12)
NEUTROPHILS # BLD MANUAL: 16.9 THOUSAND/UL (ref 1.85–7.62)
NEUTS SEG NFR BLD AUTO: 93 % (ref 43–75)
OVALOCYTES BLD QL SMEAR: PRESENT
PLATELET # BLD AUTO: 253 THOUSANDS/UL (ref 149–390)
PLATELET BLD QL SMEAR: ADEQUATE
PMV BLD AUTO: 11.6 FL (ref 8.9–12.7)
POIKILOCYTOSIS BLD QL SMEAR: PRESENT
POTASSIUM SERPL-SCNC: 3.8 MMOL/L (ref 3.5–5.3)
PROT SERPL-MCNC: 5.8 G/DL (ref 6.4–8.4)
RBC # BLD AUTO: 4.77 MILLION/UL (ref 3.88–5.62)
RBC MORPH BLD: PRESENT
SODIUM SERPL-SCNC: 142 MMOL/L (ref 135–147)
WBC # BLD AUTO: 18.17 THOUSAND/UL (ref 4.31–10.16)

## 2023-11-01 PROCEDURE — 85027 COMPLETE CBC AUTOMATED: CPT

## 2023-11-01 PROCEDURE — 36415 COLL VENOUS BLD VENIPUNCTURE: CPT

## 2023-11-01 PROCEDURE — 80053 COMPREHEN METABOLIC PANEL: CPT

## 2023-11-01 PROCEDURE — 85007 BL SMEAR W/DIFF WBC COUNT: CPT

## 2023-11-01 PROCEDURE — 99495 TRANSJ CARE MGMT MOD F2F 14D: CPT | Performed by: FAMILY MEDICINE

## 2023-11-01 RX ORDER — AZITHROMYCIN 250 MG/1
500 TABLET, FILM COATED ORAL 3 TIMES WEEKLY
Qty: 72 TABLET | Refills: 0 | Status: SHIPPED | OUTPATIENT
Start: 2023-11-01 | End: 2023-11-01 | Stop reason: SDUPTHER

## 2023-11-01 RX ORDER — AZITHROMYCIN 250 MG/1
500 TABLET, FILM COATED ORAL 3 TIMES WEEKLY
Qty: 72 TABLET | Refills: 0 | Status: SHIPPED | OUTPATIENT
Start: 2023-11-01 | End: 2024-01-30

## 2023-11-01 NOTE — PROGRESS NOTES
Jayson Camarena 1945 male MRN: [id number]    [facility] Medicine Transition of Care Visit      SUBJECTIVE    CC: MAGNUS HELTON Visit     Transitional Care Management Review:  Jayson Camarena is a 77 y.o. male here for TCM follow up. He was admitted 10/18-10/27 for pneumonia. Hospital course as per discharge summary as below:    "Pneumonia of right lower lobe due to infectious organism  Assessment & Plan  40-year-old male patient with COPD on 3 L/min of oxygen presents with increased productive cough, shortness of breath, wheezing and increased O2 requirements up to 4L NC     CXR in ER showing New bilateral lower lung pneumonia. Received ceftriaxone and and azithromycin on admission     Blood cultures from admission 1 set positive for gram-negative rods and gram-positive cocci. Bacteria identified as Moraxella. Infectious disease input appreciated. Repeat blood cultures negative to date. Patient was maintained on IV ceftriaxone while in the hospital.  Discharged on Vantin per infectious disease recommendation     Advised to follow-up with pulmonology as outpatient in 2 to 3 weeks     He sees pulmonology 11/8, going to pulmonary rehab. Finished antibiotic. * COPD with acute exacerbation ([address])  Assessment & Plan  Presents with increased work of breathing wheezing and coughing  Suspect patient is in COPD exacerbation secondary to pneumonia  Received Solu-Medrol 125 mg in the ER and DuoNeb treatment  Patient improved with IV steroids. Now on oral prednisone, will continue to taper  Finished 3 days of Azithromycin  Continue home nebulizers     Pulmonology input appreciated    At rest no issues, wife reports with walking or in AM heavy breathing. On prednisone 20 mg dose taper now.       Acute systolic congestive heart failure [facility])  Assessment & Plan      Wt Readings from Last 3 Encounters:   10/27/23 79.5 kg (175 lb 4.3 oz)   10/02/23 82.9 kg (182 lb 12.8 oz)   09/29/23 82.1 kg (181 lb)   Patient has been transitioned to oral Lasix  Cardiology input appreciated  Entresto discontinued due to low blood pressures  Continue current cardiac medications  Follow-up with PCP and cardiology as outpatient    Currently taking Lasix 40 mg daily, increased to 40 mg on his own due to swelling, leg swelling better due to this. Stopped spironolactone and Jardiance. Chronic kidney disease, stage 3a [facility])  Assessment & Plan        Lab Results   Component Value Date     EGFR 68 10/26/2023     EGFR 75 10/25/2023     EGFR 60 10/24/2023     CREATININE 1.03 10/26/2023     CREATININE 0.95 10/25/2023     CREATININE 1.15 10/24/2023      Creatinine appears to be around baseline. Continue routine lab monitoring with PCP as outpatient    Blood work ordered. Chronic respiratory failure with hypoxia (HCC)  Assessment & Plan  Chronic respiratory failure with hypoxia due to chronic COPD  Patient back to home oxygen requirement of 3 L nasal cannula    On oxygen 3-3.5 L NC right now. LAMBERTO on CPAP  Assessment & Plan  Continue cpap qhs     Coronary artery disease involving native coronary artery of native heart without angina pectoris  Assessment & Plan  Continue home aspirin 81 mg daily, Toprol 12.5 mg daily, and resume home Crestor 40 mg daily"    "Kelle Estrella is a 77 y.o. male patient who originally presented to the hospital on 10/18/2023 due to shortness of breath. Patient admitted with pneumonia and COPD exacerbation. Patient was started on IV Solu-Medrol as well as antibiotics with ceftriaxone and Zithromax. Also received IV Lasix for possible component of fluid overload. Cardiology and pulmonology were eventually consulted. Cardiology recommended discontinuing Entresto due to hypotension. Patient was maintained on metoprolol and was diuresed as tolerated. Pulmonology recommended continuing steroids and following up with infectious disease regarding positive blood cultures.   Infectious disease was consulted due to bacteremia. Patient was continued on ceftriaxone. Blood culture was positive for Moraxella. Patient was eventually transition to [facility] in anticipation of discharge. Oxygen was titrated back down to home oxygen requirement of 3 L nasal cannula. Patient transition to oral prednisone. Patient has clinically improved and is stable for discharge home with outpatient follow-up. Patient discharged on prednisone taper and Vantin to complete course of therapy. Patient was complaining of some right-sided chest wall pain. X-ray obtained, slight worsening in infiltrate however patient will be discharged on oral antibiotics and will need repeat imaging in 4 to 6-week to evaluate for clearance. Patient saturating on home oxygen saturation. Heart rate within normal limits. Continue pain control as needed. Follow-up with PCP as outpatient. Advised to return with any worsening symptoms."    During the TCM phone call patient stated:    TCM Call       Date and time call was made  10/31/2023 10:50 AM    Hospital care reviewed  Records reviewed    Patient was hospitialized at  [address] [facility]    Date of Admission  10/18/23    Date of discharge  10/27/23    Diagnosis  COPD with acure exacerbation    Disposition  Home; Home health services    Current Symptoms  None    Cough Severity  Moderate    Shortness of breath severity  Moderate          TCM Call       Post hospital issues  None    Scheduled for follow up?   Yes    Did you obtain your prescribed medications  Yes    Do you need help managing your prescriptions or medications  No    Is transportation to your appointment needed  No    I have advised the patient to call PCP with any new or worsening symptoms  Colette TREVINO    Living Arrangements  Spouse or Significiant other    Support System  Spouse    The type of support provided  Emotional; Physical; Other (comment)    Do you have social support  Yes, as much as I need            During today's visit:    James reports nasal polyp which is bothersome, needs to see ENT. Blood pressure low and mildly tachycardic today, he reports he did restart his spironolactone once he got home. Recommended to stop this, and monitor vitals at home. They are complying with their medication changes and discharge instructions. They have the following questions: As above     Review of Systems   All other systems reviewed and are negative.       Historical Information     The patient history was reviewed as follows:    Past Medical History:   Diagnosis Date    Abnormal ECG 2021 OR 2022    Alcoholism ([address]) 1984    sober 45 years    Arthritis 2008    beginning    Bilateral carotid artery stenosis     Callus     Cancer ([address])     skin    Chronic diastolic heart failure (HCC)     Chronic ischemic heart disease     Chronic kidney disease     stage 3    Colon polyp     COPD (chronic obstructive pulmonary disease) (HCC)     Coronary artery disease     hx stents, MI, PCI    COVID 11/2021    CPAP (continuous positive airway pressure) dependence     Disease of thyroid gland 2017    nodules    Emphysema of lung ([address]) 1995    started    Hearing loss     History of transfusion 1995    Hyperlipidemia     Hypertension     Intracranial aneurysm 04/25/2023    Lung nodule 2023    x rays    MI (myocardial infarction) ([address]) 1995    Myocardial infarction ([address]) 1995    Pneumonia     Pneumothorax 02/20/2023    collapsed lung    Prostate cancer ([address])     RSV (respiratory syncytial virus infection) 10/2022    S/P carotid endarterectomy     Shortness of breath     O2 2 l/nc PRN    Sleep apnea     Sleep apnea, obstructive     Stented coronary artery      Past Surgical History:   Procedure Laterality Date    APPENDECTOMY      CARDIAC CATHETERIZATION  03/18/2021    left main with no significant disease, proximal LAD with 10% stenosis at the site of prior stent, left circumflex artery with minimal luminal irregularities mid RCA with 50% stenosis at site of prior stent and PL segment with distal disease supplied by collaterals from the distal circumflex with no significant CAD requiring revascularization at that time.     CATARACT EXTRACTION, BILATERAL Bilateral     COLONOSCOPY      CORONARY ANGIOPLASTY WITH STENT PLACEMENT      RCA    CORONARY ANGIOPLASTY WITH STENT PLACEMENT      RCA    CORONARY ANGIOPLASTY WITH STENT PLACEMENT      LAD    EYE SURGERY      DC BX PROSTATE STRTCTC SATURATION SAMPLING IMG GID N/A 2022    Procedure: TRANSPERINEAL MRI FUSION  BIOPSY PROSTATE;  Surgeon: James Winston MD;  Location: [facility];  Service: Urology    DC NEUROPLASTY &/TRANSPOS MEDIAN NRV Odean Carlos Left 2022    Procedure: RELEASE CARPAL TUNNEL;  Surgeon: Pura English MD;  Location: [facility];  Service: Orthopedics    DC NEUROPLASTY &/TRANSPOSITION ULNAR NERVE ELBOW Left 2022    Procedure: Weston Girard;  Surgeon: Pura English MD;  Location: [facility];  Service: Orthopedics    DC NEUROPLASTY &/TRANSPOSITION ULNAR NERVE WRIST Left 2022    Procedure: Shonda Wilson;  Surgeon: Pura English MD;  Location: [facility];  Service: Orthopedics    SKIN CANCER EXCISION  2012    chin-per pt, basal cell  also right ankle    TONSILLECTOMY  no     Family History   Problem Relation Age of Onset    Heart attack Mother     Dementia Mother     Heart failure Mother          2006    Heart disease Mother         heart attacks (2)    No Known Problems Father       Social History   Social History     Substance and Sexual Activity   Alcohol Use Not Currently     Social History     Substance and Sexual Activity   Drug Use Never     Social History     Tobacco Use   Smoking Status Former    Packs/day: 2.00    Years: 35.00    Total pack years: 70.00    Types: Cigarettes    Start date: 1960    Quit date: 1995    Years since quittin.3   Smokeless Tobacco Never   Tobacco Comments    stopped smoking the day i had a heart attack Medications:   Meds/Allergies     Current Outpatient Medications:     aspirin 81 mg chewable tablet, 81 mg daily at bedtime, Disp: , Rfl:     azithromycin (ZITHROMAX) 250 mg tablet, Take 500 mg by mouth 3 (three) times a week Monday, Wednesday, Friday, Disp: , Rfl:     budesonide (PULMICORT) 0.5 mg/2 mL nebulizer solution, inhale contents of 1 vial ( 2 milliliters ) in nebulizer twice a day Rinse mouth after use, Disp: 180 mL, Rfl: 0    cholecalciferol (VITAMIN D3) 1,000 units tablet, Take 1,000 Units by mouth daily, Disp: , Rfl:     dextromethorphan-guaiFENesin (ROBITUSSIN DM)  mg/5 mL syrup, Take 5 mL by mouth 3 (three) times a day as needed for cough, Disp: 354 mL, Rfl: 0    Empagliflozin (Jardiance) 10 MG TABS tablet, Take 1 tablet (10 mg total) by mouth every morning, Disp: 90 tablet, Rfl: 5    famotidine (PEPCID) 20 mg tablet, TAKE 1 TABLET BY MOUTH DAILY AT  BEDTIME, Disp: 100 tablet, Rfl: 2    formoterol (PERFOROMIST) 20 MCG/2ML nebulizer solution, Take 2 mL (20 mcg total) by nebulization 2 (two) times a day, Disp: 180 mL, Rfl: 5    furosemide (LASIX) 40 mg tablet, Take 0.5 tablets (20 mg total) by mouth daily, Disp: 90 tablet, Rfl: 2    gabapentin (NEURONTIN) 300 mg capsule, Take 1 capsule (300 mg total) by mouth daily at bedtime, Disp: , Rfl:     HYDROcodone-acetaminophen (Norco) 5-325 mg per tablet, Take 1 tablet by mouth every 6 (six) hours as needed for pain Max Daily Amount: 4 tablets, Disp: 15 tablet, Rfl: 0    ipratropium-albuterol (DUO-NEB) 0.5-2.5 mg/3 mL nebulizer solution, , Disp: , Rfl:     methocarbamol (ROBAXIN) 500 mg tablet, Take 1 tablet (500 mg total) by mouth 3 (three) times a day as needed for muscle spasms, Disp: 30 tablet, Rfl: 0    metoprolol succinate (TOPROL-XL) 25 mg 24 hr tablet, Take 0.5 tablets (12.5 mg total) by mouth 2 (two) times a day, Disp: 30 tablet, Rfl: 0    naloxone (NARCAN) 4 mg/0.1 mL nasal spray, Administer 1 spray into a nostril.  If no response after 2-3 minutes, give another dose in the other nostril using a new spray., Disp: 1 each, Rfl: 1    omeprazole (PriLOSEC) 20 mg delayed release capsule, Take 1 capsule (20 mg total) by mouth daily, Disp: 28 capsule, Rfl: 0    oxygen gas, Inhale 2 L/min continuous 2LPM at rest and 3-4 LPM with activity per D Cedric FANG notes, Disp: , Rfl:     predniSONE 10 mg tablet, Take 3 tablets (30 mg total) by mouth daily for 1 day, THEN 2 tablets (20 mg total) daily for 3 days, THEN 1 tablet (10 mg total) daily for 3 days. Do not start before October 28, 2023., Disp: 12 tablet, Rfl: 0    rosuvastatin (CRESTOR) 40 MG tablet, TAKE 1 TABLET DAILY (Patient taking differently: Take 40 mg by mouth daily), Disp: 100 tablet, Rfl: 3  Allergies   Allergen Reactions    Lisinopril Swelling and Cough    Tetanus Antitoxin Anaphylaxis    Tetanus Toxoid Anaphylaxis and Swelling       OBJECTIVE    Vitals:   Vitals:    11/01/23 1005   BP: 98/50   Pulse: 69   Resp: 18   Temp: (!) 97.1 °F (36.2 °C)   TempSrc: Tympanic   SpO2: 94%   Weight: 80.7 kg (178 lb)   Height: 5' 8" (1.727 m)       Body mass index is 27.06 kg/m². Physical Exam:    Physical Exam  Vitals reviewed. Constitutional:       General: He is not in acute distress. Appearance: Normal appearance. He is not ill-appearing, toxic-appearing or diaphoretic. HENT:      Head: Normocephalic and atraumatic. Eyes:      General:         Right eye: No discharge. Left eye: No discharge. Extraocular Movements: Extraocular movements intact. Conjunctiva/sclera: Conjunctivae normal.   Cardiovascular:      Rate and Rhythm: Regular rhythm. Tachycardia present. Heart sounds: Normal heart sounds. No murmur heard. No friction rub. No gallop. Pulmonary:      Effort: Pulmonary effort is normal. No respiratory distress. Breath sounds: No stridor. Wheezing present. No rhonchi.       Comments: Diminished breath  Musculoskeletal:         General: No swelling, tenderness or signs of injury. Right lower leg: Edema present. Left lower leg: Edema present. Comments: Pitting edema   Skin:     General: Skin is warm. Coloration: Skin is not pale. Findings: No erythema or rash. Neurological:      Mental Status: He is alert and oriented to person, place, and time. Motor: No weakness. Psychiatric:         Mood and Affect: Mood normal.         Behavior: Behavior normal.          Labs: I have personally reviewed all pertinent results. No results displayed because visit has over 200 results. Imaging:  I have personally reviewed all pertinent results. Assessment/Plan    No problem-specific Assessment & Plan notes found for this encounter. Aracelis Bishop was seen today for follow-up. Diagnoses and all orders for this visit:    COPD with acute exacerbation ([address])  -     XR chest pa & lateral; Future    Hypoxia  -     XR chest pa & lateral; Future    Chronic respiratory failure with hypoxia (HCC)  -     XR chest pa & lateral; Future    Pneumonia of right lower lobe due to infectious organism  -     XR chest pa & lateral; Future    Dysphagia, unspecified type  -     Ambulatory Referral to Gastroenterology; Future    Chronic systolic congestive heart failure (HCC)    Chronic kidney disease, stage 3a (HCC)  -     CBC and differential; Future  -     Comprehensive metabolic panel;  Future          Future Appointments   Date Time Provider [address]   11/1/2023  2:00 PM [address] [facility] [place] [facility] PULMONARY EXERCISE ROOM [address] [facility] [address] [place] [address]   11/3/2023  2:00 PM [address] [facility] PULMONARY EXERCISE ROOM [address] [facility] [address] [place] [address]   11/6/2023  2:00 PM [address] [facility] [place] [facility] PULMONARY EXERCISE ROOM [address] [facility] [address] [place] [address]   11/8/2023  2:00 PM Manny Steele PA-C [facility]   11/8/2023  2:00 PM [address] [place] [place] [facility] PULMONARY EXERCISE ROOM [address] [facility] [address]   11/10/2023  2:00 PM [address] [facility] PULMONARY EXERCISE ROOM [address] [facility] [address] [place] [address]   11/29/2023  2:00 PM Keyla Pride DPM POD [facility]   12/1/2023  2:00 PM CA ECHO 1 CA Car [facility]   12/13/2023  2:40 PM Nita Cushing, MD CARD [facility]   12/13/2023  3:00 PM Sonja Macedo.  SPA ANGELITO AUD  SPA   1/4/2024  2:20 PM Aria Castellanos DO BM Cardio [facility]   1/11/2024 11:00 AM Ilan Oneil PA-C CTR [facility] [place] [facility]   2/15/2024  2:30 PM Pollo Lizarraga MD [facility] ENT [facility], [address] [place] [address] Primary Care

## 2023-11-03 ENCOUNTER — OFFICE VISIT (OUTPATIENT)
Dept: GASTROENTEROLOGY | Facility: CLINIC | Age: 78
End: 2023-11-03
Payer: COMMERCIAL

## 2023-11-03 ENCOUNTER — APPOINTMENT (OUTPATIENT)
Dept: PULMONOLOGY | Facility: HOSPITAL | Age: 78
End: 2023-11-03
Attending: INTERNAL MEDICINE
Payer: COMMERCIAL

## 2023-11-03 VITALS
WEIGHT: 170 LBS | DIASTOLIC BLOOD PRESSURE: 64 MMHG | BODY MASS INDEX: 25.76 KG/M2 | HEIGHT: 68 IN | TEMPERATURE: 96.9 F | SYSTOLIC BLOOD PRESSURE: 108 MMHG

## 2023-11-03 DIAGNOSIS — I25.10 CORONARY ARTERY DISEASE INVOLVING NATIVE CORONARY ARTERY OF NATIVE HEART WITHOUT ANGINA PECTORIS: Chronic | ICD-10-CM

## 2023-11-03 DIAGNOSIS — J96.11 CHRONIC RESPIRATORY FAILURE WITH HYPOXIA (HCC): Primary | ICD-10-CM

## 2023-11-03 DIAGNOSIS — K21.9 GASTROESOPHAGEAL REFLUX DISEASE, UNSPECIFIED WHETHER ESOPHAGITIS PRESENT: ICD-10-CM

## 2023-11-03 DIAGNOSIS — J44.9 CHRONIC OBSTRUCTIVE PULMONARY DISEASE, UNSPECIFIED COPD TYPE (HCC): ICD-10-CM

## 2023-11-03 DIAGNOSIS — I50.20 CONGESTIVE HEART FAILURE WITH LEFT VENTRICULAR SYSTOLIC DYSFUNCTION (LVSD) (HCC): ICD-10-CM

## 2023-11-03 DIAGNOSIS — R13.10 DYSPHAGIA, UNSPECIFIED TYPE: ICD-10-CM

## 2023-11-03 PROCEDURE — 99214 OFFICE O/P EST MOD 30 MIN: CPT | Performed by: INTERNAL MEDICINE

## 2023-11-03 RX ORDER — OMEPRAZOLE 20 MG/1
20 CAPSULE, DELAYED RELEASE ORAL DAILY
Qty: 28 CAPSULE | Refills: 0 | Status: SHIPPED | OUTPATIENT
Start: 2023-11-03

## 2023-11-03 RX ORDER — BACITRACIN 500 [USP'U]/G
OINTMENT OPHTHALMIC
COMMUNITY
Start: 2023-10-10

## 2023-11-03 NOTE — PROGRESS NOTES
[facility] - Outpatient Consultation  Pat Mcclendon 77 y.o. male MRN: [id number]  Encounter: [id number]          ASSESSMENT AND PLAN:      1. Oropharyngeal dysphagia  2. COPD  3. Chronic respiratory failure  4. CHF  5. CAD  6. GERD    Reviewed evaluation by speech pathology during patient's recent hospitalization. Patient has obvious oropharyngeal dysfunction. Diet has been modified to mechanical soft diet with thickened liquids. Patient and son-in-law report that since starting mechanical soft with thickened liquids, patient has not had aspiration and swallowing has improved. Discussed with patient that we could currently monitor symptoms with current diet. Given chronic respiratory failure, heart disease and heart failure history, risk of cardiopulmonary decompensation with sedation for any procedures such as EGD is high. If patient has recurrence of swallowing problems and further deviation and oropharyngeal dysfunction is ruled out, we can discuss with patient about pursuing endoscopic evaluation. Patient agreeable with plan. Continue omeprazole. Refilled the medication. We will have patient follow-up in the [place] office as it is closer to their home. RTC 3 months. Josh Bliss MD  Gastroenterology  [facility]  Date: November 3, 2023    - Ambulatory Referral to Gastroenterology    ______________________________________________________________________    HPI: 21-year-old with oropharyngeal dysphagia, COPD, chronic respiratory failure, on home oxygen, CHF with EF 35%, CAD on aspirin, CKD stage III presents for evaluation of dysphagia. Patient was recently hospitalized twice with aspiration pneumonia. During recent evaluation, patient had speech evaluation which recommended mechanical soft diet with thickened liquids due to oropharyngeal dysfunction.   Labs done during hospitalization showed renal function at baseline and normal liver enzymes based on my review of the labs today. Patient also had a high white count while hemoglobin and platelets were normal based on my review of the labs during hospitalization. Patient is accompanied by son-in-law to office today. Patient reports that he has problems with dentures. He was having problems chewing on bites with more solids but since soft foods and thickened liquids started, he has not been aspirating and his swallowing has improved. In the past he has had choking and coughing with intake of bites. Difficulty initiating swallows. No sensation of food getting stuck in the chest.  No heartburn currently. Bowel movements are normal.      REVIEW OF SYSTEMS:    CONSTITUTIONAL: Denies any fever, chills, rigors, and weight loss. HEENT: No earache or tinnitus. Denies hearing loss or visual disturbances. CARDIOVASCULAR: No chest pain or palpitations. RESPIRATORY: Denies any cough, hemoptysis, shortness of breath or dyspnea on exertion. GASTROINTESTINAL: As noted in the History of Present Illness. GENITOURINARY: No problems with urination. Denies any hematuria or dysuria. NEUROLOGIC: No dizziness or vertigo, denies headaches. MUSCULOSKELETAL: Denies any muscle or joint pain. SKIN: Denies skin rashes or itching. ENDOCRINE: Denies excessive thirst. Denies intolerance to heat or cold. PSYCHOSOCIAL: Denies depression or anxiety. Denies any recent memory loss.        Historical Information   Past Medical History:   Diagnosis Date    Abnormal ECG 2021 OR 2022    Alcoholism ([address]) 1984    sober 45 years    Arthritis 2008    beginning    Bilateral carotid artery stenosis     Callus     Cancer (HCC)     skin    Chronic diastolic heart failure (HCC)     Chronic ischemic heart disease     Chronic kidney disease     stage 3    Colon polyp     COPD (chronic obstructive pulmonary disease) (HCC)     Coronary artery disease     hx stents, MI, PCI    COVID 11/2021    CPAP (continuous positive airway pressure) dependence     Disease of thyroid gland 2017    nodules    Emphysema of lung ([address]) 1995    started    Hearing loss     History of transfusion 1995    Hyperlipidemia     Hypertension     Intracranial aneurysm 04/25/2023    Lung nodule 2023    x rays    MI (myocardial infarction) ([address]) 1995    Myocardial infarction ([address]) 1995    Pneumonia     Pneumothorax 02/20/2023    collapsed lung    Prostate cancer ([address])     RSV (respiratory syncytial virus infection) 10/2022    S/P carotid endarterectomy     Shortness of breath     O2 2 l/nc PRN    Sleep apnea     Sleep apnea, obstructive     Stented coronary artery      Past Surgical History:   Procedure Laterality Date    APPENDECTOMY      CARDIAC CATHETERIZATION  03/18/2021    left main with no significant disease, proximal LAD with 10% stenosis at the site of prior stent, left circumflex artery with minimal luminal irregularities mid RCA with 50% stenosis at site of prior stent and PL segment with distal disease supplied by collaterals from the distal circumflex with no significant CAD requiring revascularization at that time.     CATARACT EXTRACTION, BILATERAL Bilateral     COLONOSCOPY      CORONARY ANGIOPLASTY WITH STENT PLACEMENT  1999    RCA    CORONARY ANGIOPLASTY WITH STENT PLACEMENT  2001    RCA    CORONARY ANGIOPLASTY WITH STENT PLACEMENT  2003    LAD    EYE SURGERY      CA BX PROSTATE STRTCTC SATURATION SAMPLING IMG GID N/A 09/13/2022    Procedure: TRANSPERINEAL MRI FUSION  BIOPSY PROSTATE;  Surgeon: Viet Hong MD;  Location: [facility];  Service: Urology    CA NEUROPLASTY &/TRANSPOS MEDIAN NRV CARPAL Delpha Floro Left 07/22/2022    Procedure: RELEASE CARPAL TUNNEL;  Surgeon: Lionel Batista MD;  Location: [facility];  Service: Orthopedics    CA NEUROPLASTY &/TRANSPOSITION ULNAR NERVE ELBOW Left 07/22/2022    Procedure: RELEASE CUBITAL TUNNEL;  Surgeon: Lionel Batista MD;  Location: [facility];  Service: Orthopedics    CA NEUROPLASTY &/TRANSPOSITION ULNAR NERVE WRIST Left 2022    Procedure: GUYON'S CANAL RELEASE;  Surgeon: Levon Tuttle MD;  Location: [facility];  Service: Orthopedics    SKIN CANCER EXCISION  2012    chin-per pt, basal cell  also right ankle    TONSILLECTOMY  no     Social History   Social History     Substance and Sexual Activity   Alcohol Use Not Currently     Social History     Substance and Sexual Activity   Drug Use Never     Social History     Tobacco Use   Smoking Status Former    Packs/day: 2.00    Years: 35.00    Total pack years: 70.00    Types: Cigarettes    Start date: 1960    Quit date: 1995    Years since quittin.3   Smokeless Tobacco Never   Tobacco Comments    stopped smoking the day i had a heart attack     Family History   Problem Relation Age of Onset    Heart attack Mother     Dementia Mother     Heart failure Mother              Heart disease Mother         heart attacks (2)    No Known Problems Father        Meds/Allergies       Current Outpatient Medications:     aspirin 81 mg chewable tablet    azithromycin (ZITHROMAX) 250 mg tablet    bacitracin ophthalmic ointment    budesonide (PULMICORT) 0.5 mg/2 mL nebulizer solution    cholecalciferol (VITAMIN D3) 1,000 units tablet    dextromethorphan-guaiFENesin (ROBITUSSIN DM)  mg/5 mL syrup    Empagliflozin (Jardiance) 10 MG TABS tablet    famotidine (PEPCID) 20 mg tablet    formoterol (PERFOROMIST) 20 MCG/2ML nebulizer solution    furosemide (LASIX) 40 mg tablet    gabapentin (NEURONTIN) 300 mg capsule    HYDROcodone-acetaminophen (Norco) 5-325 mg per tablet    ipratropium-albuterol (DUO-NEB) 0.5-2.5 mg/3 mL nebulizer solution    methocarbamol (ROBAXIN) 500 mg tablet    metoprolol succinate (TOPROL-XL) 25 mg 24 hr tablet    naloxone (NARCAN) 4 mg/0.1 mL nasal spray    omeprazole (PriLOSEC) 20 mg delayed release capsule    oxygen gas    predniSONE 10 mg tablet    rosuvastatin (CRESTOR) 40 MG tablet    Allergies   Allergen Reactions    Lisinopril Swelling and Cough    Tetanus Antitoxin Anaphylaxis    Tetanus Toxoid Anaphylaxis and Swelling           Objective     Blood pressure 108/64, temperature (!) 96.9 °F (36.1 °C), height 5' 8" (1.727 m), weight 77.1 kg (170 lb). Body mass index is 25.85 kg/m². PHYSICAL EXAM:      General Appearance:   Alert, cooperative, no distress   HEENT:   Normocephalic, atraumatic, anicteric. Neck:  Supple, symmetrical, trachea midline   Lungs:   Clear to auscultation bilaterally; no rales, rhonchi or wheezing; respirations unlabored    Heart[de-identified]   Regular rate and rhythm; no murmur, rub, or gallop. Abdomen:   Soft, non-tender, non-distended; normal bowel sounds; no masses, no organomegaly    Genitalia:   Deferred    Rectal:   Deferred    Extremities:  No cyanosis, clubbing or edema    Pulses:  2+ and symmetric    Skin:  No jaundice, rashes, or lesions    Lymph nodes:  No palpable cervical lymphadenopathy        Lab Results:   No visits with results within 1 Day(s) from this visit.    Latest known visit with results is:   Appointment on 11/01/2023   Component Date Value    WBC 11/01/2023 18.17 (H)     RBC 11/01/2023 4.77     Hemoglobin 11/01/2023 14.7     Hematocrit 11/01/2023 47.8     MCV 11/01/2023 100 (H)     MCH 11/01/2023 30.8     MCHC 11/01/2023 30.8 (L)     RDW 11/01/2023 15.9 (H)     MPV 11/01/2023 11.6     Platelets 79/06/8433 253     Sodium 11/01/2023 142     Potassium 11/01/2023 3.8     Chloride 11/01/2023 98     CO2 11/01/2023 31     ANION GAP 11/01/2023 13     BUN 11/01/2023 26 (H)     Creatinine 11/01/2023 1.12     Glucose, Fasting 11/01/2023 87     Calcium 11/01/2023 8.7     AST 11/01/2023 27     ALT 11/01/2023 33     Alkaline Phosphatase 11/01/2023 58     Total Protein 11/01/2023 5.8 (L)     Albumin 11/01/2023 3.8     Total Bilirubin 11/01/2023 0.83     eGFR 11/01/2023 62     Segmented % 11/01/2023 93 (H)     Lymphocytes % 11/01/2023 5 (L)     Monocytes % 11/01/2023 2 (L) Eosinophils, % 11/01/2023 0     Basophils % 11/01/2023 0     Absolute Neutrophils 11/01/2023 16.90 (H)     Lymphocytes Absolute 11/01/2023 0.91     Monocytes Absolute 11/01/2023 0.36     Eosinophils Absolute 11/01/2023 0.00     Basophils Absolute 11/01/2023 0.00     RBC Morphology 11/01/2023 Present     Platelet Estimate 74/89/2645 Adequate     Anisocytosis 11/01/2023 Present     Macrocytes 11/01/2023 Present     Ovalocytes 11/01/2023 Present     Poikilocytes 11/01/2023 Present          Radiology Results:   XR chest portable    Result Date: 10/27/2023  Narrative: CHEST INDICATION:   Shortness of breath, follow up. COMPARISON: CT chest 10/21/2023, chest radiograph 10/20/2023 EXAM PERFORMED/VIEWS:  XR CHEST PORTABLE FINDINGS: Cardiomediastinal silhouette appears unremarkable. Mild worsening of right basilar pulmonary opacities. No pneumothorax. Osseous structures appear within normal limits for patient age. Impression: Mild worsening of right basilar pulmonary opacity. Resident: Elsy Painter, the attending radiologist, have reviewed the images and agree with the final report above. Workstation performed: [id number]     CT chest wo contrast    Result Date: 10/21/2023  Narrative: CT CHEST WITHOUT IV CONTRAST INDICATION:   COPD, exacerbation Shortness of breath, worsening cough, copd exacerbation. COMPARISON:  CXR 10/20/2023 and 10/18/2023, chest CT 8/13/2023, 6/28/2023, 5/1/2023, 2/22/2023, 2/28/2022. TECHNIQUE: Chest CT without intravenous contrast.  Axial, sagittal, coronal 2D reformats and coronal MIPS from source data. Radiation dose length product (DLP):  362 mGy-cm . Radiation dose exposure minimized using iterative reconstruction and automated exposure control. FINDINGS: LUNGS: Mild diffuse septal thickening due to interstitial edema. Severe upper lobe predominant panlobular emphysema. Mild debris in the right lower lobe bronchi with atelectasis of the basal segments of the right lower lobe.  Dependent hyperdensity in both lower lobes which could be due to aspirated barium. Mild atelectasis in the posterior basal left lower lobe. Benign calcified granulomas. AIRWAYS: Diffuse bronchial wall thickening. Mild debris in the right lower lobe bronchi. PLEURA: Small right and trace left pleural effusion. HEART/GREAT VESSELS:  Normal for age. Moderate coronary artery calcification indicating atherosclerotic heart disease. Cardiac stents. Moderate calcification of the aortic valve leaflets which can contribute to aortic stenosis. Pulmonary artery enlargement. MEDIASTINUM AND ZION:  Unremarkable. CHEST WALL AND LOWER NECK: Moderate gynecomastia. Clips in the right neck. Subcentimeter peripherally calcified left thyroid nodule. No follow-up needed. UPPER ABDOMEN: Mild cardiomegaly. OSSEOUS STRUCTURES: Mild degenerative disease in the spine. Healed bilateral rib fractures. Impression: Septal thickening due to interstitial edema with small right and trace left effusion. Debris in the right lower lobe bronchi with atelectasis of the posterior basal right lower lobe. Minimal atelectasis of the posterior basal left lower lobe. Severe upper lobe predominant panlobular emphysema. Pulmonary artery enlargement which can be seen with pulmonary hypertension. Workstation performed: [id number]     XR chest portable    Result Date: 10/20/2023  Narrative: CHEST INDICATION:   Shortness of breath. COMPARISON:  None EXAM PERFORMED/VIEWS:  XR CHEST PORTABLE FINDINGS: Right neck base surgical clips. Cardiomediastinal silhouette appears unremarkable. Resolved left basilar and improved right basilar opacities. No effusion or pneumothorax. Osseous structures appear within normal limits for patient age. Impression: Improving pneumonia. Workstation performed: [id number]     XR chest 1 view portable    Result Date: 10/18/2023  Narrative: CHEST INDICATION:   Shortness of breath, chest pain. History of COPD.  COMPARISON: Chest radiograph 9/20/2023 EXAM PERFORMED/VIEWS:  XR CHEST PORTABLE  AP semierect Images: 2 FINDINGS: Cardiomediastinal silhouette appears unremarkable. New bilateral basilar patchy, hazy opacities, more prominent on the right. No pneumothorax or pleural effusion. Osseous structures appear within normal limits for patient age. Impression: New bilateral lower lung pneumonia. Resident: Ayden Barraza, the attending radiologist, have reviewed the images and agree with the final report above.  Workstation performed: [id number]    Answers submitted by the patient for this visit:  Abdominal Pain Questionnaire (Submitted on 11/2/2023)  Chief Complaint: Abdominal pain  Diagnostic workup: CT scan

## 2023-11-06 ENCOUNTER — APPOINTMENT (OUTPATIENT)
Dept: PULMONOLOGY | Facility: HOSPITAL | Age: 78
End: 2023-11-06
Attending: INTERNAL MEDICINE
Payer: COMMERCIAL

## 2023-11-06 ENCOUNTER — PATIENT OUTREACH (OUTPATIENT)
Dept: CASE MANAGEMENT | Facility: OTHER | Age: 78
End: 2023-11-06

## 2023-11-06 DIAGNOSIS — J44.9 CHRONIC OBSTRUCTIVE PULMONARY DISEASE, UNSPECIFIED COPD TYPE (HCC): Primary | ICD-10-CM

## 2023-11-06 DIAGNOSIS — J44.1 CHRONIC OBSTRUCTIVE PULMONARY DISEASE WITH ACUTE EXACERBATION (HCC): Primary | ICD-10-CM

## 2023-11-06 PROCEDURE — 94626 PHY/QHP OP PULM RHB W/MNTR: CPT

## 2023-11-06 NOTE — PROGRESS NOTES
[facility] OP RT CM called and spoke with patient' daughter Finesse Mendoza regarding his breathing, medications and O2. She was currently at work but was able to answer a few questions. She stated that he has a hard time hearing so she has her cell phone number as a primary contact. Finesse Mendoza was appreciative of outreach. We discussed his medications and usage, O2 and SpO2 levels. She stated his breathing is improved since hospitalization. Jaci Morgan is getting PT at home and has stopped pulmonary rehab for now. Jaci Morgan is feeling better and eager to go out. His daughter is worried about him getting sick with the flu season. She doesn't want him to drive however she, her daughter or  take him to appointments. Transportation is not a problem. He has an upcoming appointment on 11/8/23 with pulmonary and we discussed the time, date, location and provider. Finesse Mendoza stated he received COPD booklet and zone tools in the hospital however I will send. Finesse Mendoza states his SpO2 is 93% on 3lpm nasal cannula at rest but drops significantly while ambulating. I encouraged her to have him pace himself and perform breathing exercises. We discussed pursed lip breathing exercises. I will send some breathing exercises and pulmonary rehab exercises for him to perform at home. She was appreciative. I explained the [employer] meetings and will send information regarding as well. Finesse Mendoza is unsure weather he is coughing up sputum. I will send information and f/u next week. I explained my role and Finesse Mendoza has my contact information.

## 2023-11-08 ENCOUNTER — APPOINTMENT (OUTPATIENT)
Dept: PULMONOLOGY | Facility: HOSPITAL | Age: 78
End: 2023-11-08
Attending: INTERNAL MEDICINE
Payer: COMMERCIAL

## 2023-11-08 ENCOUNTER — OFFICE VISIT (OUTPATIENT)
Dept: PULMONOLOGY | Facility: CLINIC | Age: 78
End: 2023-11-08
Payer: COMMERCIAL

## 2023-11-08 VITALS
HEART RATE: 103 BPM | OXYGEN SATURATION: 94 % | DIASTOLIC BLOOD PRESSURE: 76 MMHG | HEIGHT: 68 IN | SYSTOLIC BLOOD PRESSURE: 118 MMHG | WEIGHT: 170.8 LBS | BODY MASS INDEX: 25.88 KG/M2 | TEMPERATURE: 96 F

## 2023-11-08 DIAGNOSIS — J96.11 CHRONIC RESPIRATORY FAILURE WITH HYPOXIA (HCC): Primary | ICD-10-CM

## 2023-11-08 DIAGNOSIS — J44.9 CHRONIC OBSTRUCTIVE PULMONARY DISEASE, UNSPECIFIED COPD TYPE (HCC): ICD-10-CM

## 2023-11-08 PROCEDURE — 99214 OFFICE O/P EST MOD 30 MIN: CPT | Performed by: PHYSICIAN ASSISTANT

## 2023-11-08 PROCEDURE — 94626 PHY/QHP OP PULM RHB W/MNTR: CPT

## 2023-11-08 NOTE — PROGRESS NOTES
Pulmonary Rehabilitation Plan of Care   60 Day Reassessment      Today's date: 2023   # of Exercise Sessions Completed: 10  Patient name: Venkat Bryant      : 1945  Age: 77 y.o. MRN: [id number]  Referring Physician: Teagan Quiroz MD (Internal Med)  Pulmonologist: Gilberto Orta DO  Provider: [facility]  Clinician: Marija Shirley MS    Dx:  COPD, very severe  Ratio 44%  Date of onset: 2023 (Last hospitalized exacerbation)  Hospitalized on 10/18/2023 with pneumonia and COPD exacerbation      SUMMARY OF PROGRESS:  Jovana Stanley has completed 10 sessions of Pulmonary Rehab for the diagnosis of COPD. Patient does have a PFT on file, revealing an FEV1/FVC ratio of 44% and an FEV1 of 1.06 L (38%). This is suggestive of severeobstruction. Since their diagnosis, the patient has been experiencing increased dyspnea, increased sitting time, decreased physical activity, increased fatigue and increased reliance on supplemental O2. They report dyspnea and fatigue when completing ADLs . The patient currently does not follow a formal exercise program at home. Pt reports the following physical limitations: decreased strength, fatigue, CHACON. Depression screening using the PHQ-9 interprets the patient's score of 5 5-9 = Mild Depression. Anxiety screening using the PORTILLO-7 interprets the patients score of 3  0-4  = Not anxious. When addressed, the patient admits to having depression/anxiety. Patient reports excellent social/emotional support from friends and family. Information to begin using [employer] was provided as well as contact information for counseling through [facility]. The patient is a former smoker (quit 28 years ago). He has abstained since quitting. Patient admits to 100% medication compliance. At rest, the patient rated dyspnea 0/10 with SpO2 93% on supplemental O2 3L/min via nasal canula. Resting HR 99 and /68. He has been working at a 3.68 MET level.  During exercise, HR 113, /66, and SpO2 89-93%. Patient exercises on 3-4L/min. Telemetry revealed NSR w/ freq PVCs. During recovery, , /68, and SpO2 91%. Education on smoking cessation, oxygen use, breathing techniques, pulmonary anatomy, exercise for the pulmonary patient, healthy eating, stress, and relaxation will be provided. Patient goals include: increase strength, improve CHACON, improve LVEF to qualify for [place] valve procedure. .      There is no significant progress to report in this 30 day reporting window because the patient only attended 3 exercise sessions due to a recent hospitalization. Due to Shlomo's known cardiac history and need for increased LVEF, he will also be monitored via telemetry. Telemetery revealed NSR w/ freq ectopy. Freq ectopy is noted in recent cardiology notes.  His primary cardiologist is De Goodpasture, DO and advanced heart failure cardiologist is Alissa Paz MD.      Medication compliance: Yes   Comments: Pt reports to be compliant with medications    Fall Risk: Low   Comments: Ambulates with a steady gait with no assist device    Smoking: Former user    RPD at Rest: 0-1/10  RPD with Exercise:  0-4/10    Assessment of progression of lung disease and functional status:  CAT: 19/40  Shortness of breath questionnaire: 47/120      EXERCISE ASSESSMENT and PLAN    Current Exercise Program in Rehab:       Frequency: 2-3 days/week   Supplement with home exercise 2+ days/wk as tolerated        Minutes: 30-35         METS: 3.68              SpO2: >88%              RPD: 4-6                      HR: 20-30 bpm above rest   RPE: 4-5         Modalities: UBE, NuStep, Recumbent bike and Room walking      Exercise Progression 30 Day Goals :    Frequency: 2-3 days/week    Supplement with home exercise 2+ days/wk as tolerated       Minutes: 40-45         METS: 3.68-4.5              SpO2: >88%              RPD: 4-6                      HR: 20-30 bpm above rest   RPE: 4-5        Modalities: UBE, NuStep, Recumbent bike and Room walking     Strength training: Will be added following 2-3 weeks of monitored exercise sessions   Modalities: Leg Press, Chest Press and Calf Raises    Oxygen needs:   Rest:  supplemental O2 via nasal cannula @ 2L/min   Exercise/physical activity:  supplemental O2 via nasal cannula @ 3-4L/min   Sleep:   supplemental O2 via nasal cannula @ 2.5L/min  and CPAP/BiPap    Oxygen Goal: Maintain SpO2>88% during exercise    Home Exercise: none  Education: pursed lipped breathing, diaphragmatic breathing, fall prevention while using nasal canula tubing, relaxation breathing, home exercise guidelines, benefits of exercise for pulmonary disease, RPE scale and RPD scale    Goals: reduced score on  USCD Shortness of Breath Questionnaire, Improved 6MW distance by 10%, reduced dyspnea during exercise , improved exercise tolerance (max METs tolerated in pulmonary rehab), SpO2 >88% during exercise, reduced dependence on supplemental O2, reduced score on CAT, reduced number of COPD exacerbations and attend pulmonary rehab regularly  Progressing:  Pt is progressing and showing improvement  toward the following goals:  increasing MET level, reduced CHACON, improved SpO2 readings. , Will continue to educate and progress as tolerated., Goals met: attends rehab regularly. Plan: Titrate supplemental oxygen as needed to maintain SpO2>88% with exercise, learn to conserve energy with ADLs , practice diaphragmatic breathing and reduce time sitting at home    Readiness to change: Preparation:  (Getting ready to change)       NUTRITION ASSESSMENT AND PLAN    Weight control:    Starting weight: 182   Current weight: 170  Patient had unexpected/unplanned weight loss with his recent 9 day hospitalization    Diabetes: N/A  A1c: 6.5    last measured: 9/2/2023    Lipid Panel: 9/2/2023  Tot: 159, Tri: 171, HDL: 73, LDL: 52    Goals: Increase water intake to reduce/thin mucus production Reduced SOB while eating Smaller more frequent meals Improve hydration  Education: heart healthy eating  low sodium diet  hydration  nutrition for  lipid management  Progressing:Will continue to educate and progress as tolerated. Plan: Education class: Reading Food Labels and Education Class: Heart Healthy Eating  Readiness to change: Preparation:  (Getting ready to change)       PSYCHOSOCIAL ASSESSMENT AND PLAN    Emotional:  Depression assessment:  PHQ-9 = 5 5-9 = Mild Depression            Anxiety measure:  PORTILLO-7 = 3 0-4  = Not anxious  Self-reported stress level: 5   Social support: Very Good    Goals:  Reduce perceived stress to 1-3/10, improved [facility] QOL < 27, PHQ-9 - reduced severity by one level, Increased interest in doing things, improved sleep, Improved appetite and improved concentration  Education: signs/sxs of depression, benefits of a positive support system, stress management techniques, depression and CAD and benefits of mental health counseling    Progressing:Pt is progressing and showing improvement  toward the following goals:  improving stress level.  , Will continue to educate and progress as tolerated.   Plan: Class: Stress and Your Health and Class: Relaxation  Readiness to change: Preparation:  (Getting ready to change)       OTHER CORE COMPONENTS     Tobacco:   Social History     Tobacco Use   Smoking Status Former    Packs/day: 2.00    Years: 35.00    Total pack years: 70.00    Types: Cigarettes    Start date: 1960    Quit date: 1995    Years since quittin.3   Smokeless Tobacco Never   Tobacco Comments    stopped smoking the day i had a heart attack       Tobacco Use Intervention: Referral to tobacco expert:   N/A: Pt has a remote history of smoking    Blood pressure:    Restin/68   Exercise: 136/66   Recovery: 116/68    Goals: consistent BP < 130/80, reduced dietary sodium <2300mg, moderate intensity exercise >150 mins/wk and medication compliance  Education:  pathophysiology of pulmonary disease, preventing infections, environmental triggers, nebulizer use, bronchodilators, bronchial hygiene, traveling with pulmonary disease, class: Pulmonary Anatomy and Physiology and class:  Pulmonary Medications  Progressing:Will continue to educate and progress as tolerated., Goals met: BP < 130/80, 100% medication compliance.   Plan: Class: Understanding Heart Disease and Class: Common Heart Medications  Readiness to change: Preparation:  (Getting ready to change)

## 2023-11-08 NOTE — PROGRESS NOTES
Pulmonary Follow Up Note   Erika Flaherty 77 y.o. male MRN: [id number]  11/8/2023      Assessment:    COPD (chronic obstructive pulmonary disease) ([address])  Has recovered from his recent COPD exacerbation. No need for additional systemic steroids at this time. He will be continued on an all nebulized regimen of Perforomist/budesonide twice daily and DuoNeb 4 times a day. Expressed interest in endobronchial valves during today's visit again however unfortunately patient's most recent echocardiogram in August shows an EF of 35% which excludes him from this at this time. Recommended continuing pulmonary rehab and will reassess at next office visit after his repeat echo which is currently scheduled in December. Chronic respiratory failure with hypoxia (HCC)  Continue 3 L nasal cannula continuously to maintain saturations greater than or equal to 88%. Continue pulmonary rehab. LAMBERTO on CPAP  Continue CPAP nightly. Pneumonia of right lower lobe due to infectious organism  Noted during last hospitalization. He has completed antibiotics and is improved at this time. No additional antibiotics needed at this time. PCP has already ordered a chest x-ray. Recommend he have this done in 6 to 8 weeks to confirm resolution of right lower lobe opacity. Plan:    Diagnoses and all orders for this visit:    Chronic respiratory failure with hypoxia ([address])  -     Humidifier MISC; Use continuous    Chronic obstructive pulmonary disease, unspecified COPD type ([address])        No follow-ups on file. History of Present Illness   HPI:  Erika Flaherty is a 77 y.o. male who presents the office today for hospital follow-up. Patient was hospitalized [address] [place] [address] [address] 10/18-10/27 due to shortness of breath. Diagnosed with COPD exacerbation secondary to pneumonia and later bacteremia. Pulmonary and infectious disease follow-up.   He was treated with IV steroids, nebulizers and ceftriaxone due to blood culture being positive for Moraxella. He was eventually transitioned to [facility] at discharge and he has since completed that and prednisone taper. During today's appointment patient states that he is returned back to his baseline. His chief complaint is nasal dryness which he attributes to continuous oxygen use. He endorses chronic dyspnea on exertion and chronic cough without mucopurulent sputum production or hemoptysis. He denies active wheezing currently. No chest pain or palpitations. No fevers chills or sick contacts. Review of Systems   Constitutional:  Positive for appetite change. Negative for fever. HENT:  Positive for postnasal drip, sneezing, sore throat and trouble swallowing. Negative for ear pain and rhinorrhea. Respiratory:  Positive for shortness of breath and wheezing. Cardiovascular:  Negative for chest pain. Musculoskeletal:  Negative for myalgias. Neurological:  Positive for headaches. All other systems reviewed and are negative.       Historical Information   Past Medical History:   Diagnosis Date    Abnormal ECG 2021 OR 2022    Alcoholism ([address]) 1984    sober 38 years    Arthritis 2008    beginning    Bilateral carotid artery stenosis     Callus     Cancer (HCC)     skin    Chronic diastolic heart failure (HCC)     Chronic ischemic heart disease     Chronic kidney disease     stage 3    Colon polyp     COPD (chronic obstructive pulmonary disease) (HCC)     Coronary artery disease     hx stents, MI, PCI    COVID 11/2021    CPAP (continuous positive airway pressure) dependence     Disease of thyroid gland 2017    nodules    Emphysema of lung ([address]) 1995    started    Hearing loss     History of transfusion 1995    Hyperlipidemia     Hypertension     Intracranial aneurysm 04/25/2023    Lung nodule 2023    x rays    MI (myocardial infarction) ([address]) 1995    Myocardial infarction ([address]) 1995    Pneumonia     Pneumothorax 02/20/2023    collapsed lung    Prostate cancer ([address])     RSV (respiratory syncytial virus infection) 10/2022    S/P carotid endarterectomy     Shortness of breath     O2 2 l/nc PRN    Sleep apnea     Sleep apnea, obstructive     Stented coronary artery      Past Surgical History:   Procedure Laterality Date    APPENDECTOMY      CARDIAC CATHETERIZATION  2021    left main with no significant disease, proximal LAD with 10% stenosis at the site of prior stent, left circumflex artery with minimal luminal irregularities mid RCA with 50% stenosis at site of prior stent and PL segment with distal disease supplied by collaterals from the distal circumflex with no significant CAD requiring revascularization at that time.     CATARACT EXTRACTION, BILATERAL Bilateral     COLONOSCOPY      CORONARY ANGIOPLASTY WITH STENT PLACEMENT      RCA    CORONARY ANGIOPLASTY WITH STENT PLACEMENT      RCA    CORONARY ANGIOPLASTY WITH STENT PLACEMENT      LAD    EYE SURGERY      SC BX PROSTATE STRTCTC SATURATION SAMPLING IMG GID N/A 2022    Procedure: TRANSPERINEAL MRI FUSION  BIOPSY PROSTATE;  Surgeon: Tristan Camejo MD;  [facility]: [facility];  Service: Urology    SC NEUROPLASTY &/TRANSPOS MEDIAN NRV Tanya Randtead Left 2022    Procedure: RELEASE CARPAL TUNNEL;  Surgeon: Jenna Ashford MD;  Location: [facility];  Service: Orthopedics    SC NEUROPLASTY &/TRANSPOSITION ULNAR NERVE ELBOW Left 2022    Procedure: RELEASE CUBITAL TUNNEL;  Surgeon: Jenna Ashford MD;  Location[facility];  Service: Orthopedics    SC NEUROPLASTY &/TRANSPOSITION ULNAR NERVE WRIST Left 2022    Procedure: Nubia Landeros;  Surgeon: Jenna Ashford MD;  Location: [facility];  Service: Orthopedics    SKIN CANCER EXCISION      chin-per pt, basal cell  also right ankle    TONSILLECTOMY  no     Family History   Problem Relation Age of Onset    Heart attack Mother     Dementia Mother     Heart failure Mother          2006    Heart disease Mother         heart attacks (2)    No Known Problems Father        Social History     Tobacco Use   Smoking Status Former    Packs/day: 2.00    Years: 35.00    Total pack years: 70.00    Types: Cigarettes    Start date: 1960    Quit date: 1995    Years since quittin.3   Smokeless Tobacco Never   Tobacco Comments    stopped smoking the day i had a heart attack         Meds/Allergies     Current Outpatient Medications:     Humidifier MISC, Use continuous, Disp: 1 each, Rfl: 11    aspirin 81 mg chewable tablet, 81 mg daily at bedtime, Disp: , Rfl:     azithromycin (ZITHROMAX) 250 mg tablet, Take 2 tablets (500 mg total) by mouth 3 (three) times a week Monday, Wednesday, Friday, Disp: 72 tablet, Rfl: 0    bacitracin ophthalmic ointment, , Disp: , Rfl:     budesonide (PULMICORT) 0.5 mg/2 mL nebulizer solution, inhale contents of 1 vial ( 2 milliliters ) in nebulizer twice a day Rinse mouth after use, Disp: 180 mL, Rfl: 0    cholecalciferol (VITAMIN D3) 1,000 units tablet, Take 1,000 Units by mouth daily, Disp: , Rfl:     dextromethorphan-guaiFENesin (ROBITUSSIN DM)  mg/5 mL syrup, Take 5 mL by mouth 3 (three) times a day as needed for cough, Disp: 354 mL, Rfl: 0    Empagliflozin (Jardiance) 10 MG TABS tablet, Take 1 tablet (10 mg total) by mouth every morning, Disp: 90 tablet, Rfl: 5    famotidine (PEPCID) 20 mg tablet, TAKE 1 TABLET BY MOUTH DAILY AT  BEDTIME, Disp: 100 tablet, Rfl: 2    formoterol (PERFOROMIST) 20 MCG/2ML nebulizer solution, Take 2 mL (20 mcg total) by nebulization 2 (two) times a day, Disp: 180 mL, Rfl: 5    furosemide (LASIX) 40 mg tablet, Take 0.5 tablets (20 mg total) by mouth daily, Disp: 90 tablet, Rfl: 2    gabapentin (NEURONTIN) 300 mg capsule, Take 1 capsule (300 mg total) by mouth daily at bedtime, Disp: , Rfl:     HYDROcodone-acetaminophen (Norco) 5-325 mg per tablet, Take 1 tablet by mouth every 6 (six) hours as needed for pain Max Daily Amount: 4 tablets, Disp: 15 tablet, Rfl: 0    ipratropium-albuterol (DUO-NEB) 0.5-2.5 mg/3 mL nebulizer solution, , Disp: , Rfl:     methocarbamol (ROBAXIN) 500 mg tablet, Take 1 tablet (500 mg total) by mouth 3 (three) times a day as needed for muscle spasms, Disp: 30 tablet, Rfl: 0    metoprolol succinate (TOPROL-XL) 25 mg 24 hr tablet, Take 0.5 tablets (12.5 mg total) by mouth 2 (two) times a day, Disp: 30 tablet, Rfl: 0    naloxone (NARCAN) 4 mg/0.1 mL nasal spray, Administer 1 spray into a nostril. If no response after 2-3 minutes, give another dose in the other nostril using a new spray., Disp: 1 each, Rfl: 1    nystatin (MYCOSTATIN) 500,000 units/5 mL suspension, 5 ml orally (swish and spit/swallow) every four hours while awake, Disp: 180 mL, Rfl: 1    omeprazole (PriLOSEC) 20 mg delayed release capsule, Take 1 capsule (20 mg total) by mouth daily, Disp: 28 capsule, Rfl: 0    oxygen gas, Inhale 2 L/min continuous 2LPM at rest and 3-4 LPM with activity per D Cedric FANG notes, Disp: , Rfl:     rosuvastatin (CRESTOR) 40 MG tablet, TAKE 1 TABLET DAILY (Patient taking differently: Take 40 mg by mouth daily), Disp: 100 tablet, Rfl: 3  Allergies   Allergen Reactions    Lisinopril Swelling and Cough    Tetanus Antitoxin Anaphylaxis    Tetanus Toxoid Anaphylaxis and Swelling       Vitals: Blood pressure 118/76, pulse 103, temperature (!) 96 °F (35.6 °C), temperature source Tympanic, height 5' 8" (1.727 m), weight 77.5 kg (170 lb 12.8 oz), SpO2 94 %. Body mass index is 25.97 kg/m². Oxygen Therapy  SpO2: 94 %  Oxygen Therapy: Supplemental oxygen  O2 Flow Rate (L/min): 3 L/min (On 3L of Oxygen)    Physical Exam  Physical Exam  Vitals reviewed. Constitutional:       Appearance: Normal appearance. He is well-developed. HENT:      Head: Normocephalic and atraumatic. Nose: Nose normal.      Mouth/Throat:      Mouth: Mucous membranes are moist.   Eyes:      Extraocular Movements: Extraocular movements intact. Cardiovascular:      Rate and Rhythm: Normal rate and regular rhythm.       Heart sounds: Normal heart sounds. Pulmonary:      Effort: Pulmonary effort is normal. No respiratory distress. Breath sounds: No wheezing, rhonchi or rales. Musculoskeletal:         General: No swelling. Skin:     General: Skin is warm and dry. Neurological:      Mental Status: He is alert. Mental status is at baseline. Psychiatric:         Mood and Affect: Mood normal.         Behavior: Behavior normal.         Labs: I have personally reviewed pertinent lab results. , ABG: No results found for: "PHART", "JTC1ZJG", "PO2ART", "IYQ4COH", "L9FLSSYZ", "BEART", "SOURCE", BNP: No results found for: "BNP", CBC: No results found for: "WBC", "HGB", "HCT", "MCV", "PLT", "ADJUSTEDWBC", "RBC", "MCH", "MCHC", "RDW", "MPV", "NRBC", CMP: No results found for: "SODIUM", "K", "CL", "CO2", "ANIONGAP", "BUN", "CREATININE", "GLUCOSE", "CALCIUM", "AST", "ALT", "ALKPHOS", "PROT", "BILITOT", "EGFR", PT/INR: No results found for: "PT", "INR", Troponin: No results found for: "TROPONINI"  Lab Results   Component Value Date    WBC 18.17 (H) 11/01/2023    HGB 14.7 11/01/2023    HCT 47.8 11/01/2023     (H) 11/01/2023     11/01/2023     Lab Results   Component Value Date    CALCIUM 8.7 11/01/2023    K 3.8 11/01/2023    CO2 31 11/01/2023    CL 98 11/01/2023    BUN 26 (H) 11/01/2023    CREATININE 1.12 11/01/2023     Lab Results   Component Value Date    IGE 88.1 08/29/2022     Lab Results   Component Value Date    ALT 33 11/01/2023    AST 27 11/01/2023    ALKPHOS 58 11/01/2023       Imaging and other studies: I have personally reviewed pertinent reports. and I have personally reviewed pertinent films in PACS     CT chest without contrast 10/21/2023  IMPRESSION:     Septal thickening due to interstitial edema with small right and trace left effusion. Debris in the right lower lobe bronchi with atelectasis of the posterior basal right lower lobe. Minimal atelectasis of the posterior basal left lower lobe.      Severe upper lobe predominant panlobular emphysema. Pulmonary artery enlargement which can be seen with pulmonary hypertension.      Answers submitted by the patient for this visit:  Pulmonology Questionnaire (Submitted on 11/7/2023)  Chief Complaint: Primary symptoms  Do you have shortness of breath?: Yes  Do you have wheezing?: Yes  Chronicity: chronic  When did you first notice your symptoms?: more than 1 year ago  How often do your symptoms occur?: intermittently  Since you first noticed this problem, how has it changed?: unchanged  Have you had a change in appetite?: Yes  Do you have chest pain?: No  Do you have shortness of breath that occurs with effort or exertion?: Yes  Do you have ear congestion?: Yes  Do you have ear pain?: No  Do you have a fever?: No  Do you have headaches?: Yes  Do you have heartburn?: No  Do you have fatigue?: Yes  Do you have muscle pain?: No  Do you have nasal congestion?: Yes  Do you have shortness of breath when lying flat?: No  Do you have shortness of breath when you wake up?: No  Do you have post-nasal drip?: Yes  Do you have a runny nose?: No  Do you have sneezing?: Yes  Do you have a sore throat?: Yes  Do you have sweats?: No  Do you have trouble swallowing?: Yes  Have you experienced weight loss?: Yes  Which of the following makes your symptoms worse?: climbing stairs, exposure to fumes, exposure to smoke, strenuous activity, URI  Which of the following makes your symptoms better?: oral steroids, OTC cough suppressant, steroid inhaler

## 2023-11-09 DIAGNOSIS — E53.8 B12 DEFICIENCY: ICD-10-CM

## 2023-11-09 DIAGNOSIS — D72.829 LEUKOCYTOSIS, UNSPECIFIED TYPE: Primary | ICD-10-CM

## 2023-11-09 DIAGNOSIS — R71.8 ELEVATED MCV: ICD-10-CM

## 2023-11-09 NOTE — ASSESSMENT & PLAN NOTE
Has recovered from his recent COPD exacerbation. No need for additional systemic steroids at this time. He will be continued on an all nebulized regimen of Perforomist/budesonide twice daily and DuoNeb 4 times a day. Expressed interest in endobronchial valves during today's visit again however unfortunately patient's most recent echocardiogram in August shows an EF of 35% which excludes him from this at this time. Recommended continuing pulmonary rehab and will reassess at next office visit after his repeat echo which is currently scheduled in December.

## 2023-11-09 NOTE — ASSESSMENT & PLAN NOTE
Noted during last hospitalization. He has completed antibiotics and is improved at this time. No additional antibiotics needed at this time. PCP has already ordered a chest x-ray. Recommend he have this done in 6 to 8 weeks to confirm resolution of right lower lobe opacity. no

## 2023-11-10 ENCOUNTER — CLINICAL SUPPORT (OUTPATIENT)
Dept: PULMONOLOGY | Facility: HOSPITAL | Age: 78
End: 2023-11-10
Attending: INTERNAL MEDICINE
Payer: COMMERCIAL

## 2023-11-10 DIAGNOSIS — J44.9 CHRONIC OBSTRUCTIVE PULMONARY DISEASE, UNSPECIFIED COPD TYPE (HCC): ICD-10-CM

## 2023-11-10 PROCEDURE — 94626 PHY/QHP OP PULM RHB W/MNTR: CPT

## 2023-11-13 ENCOUNTER — CLINICAL SUPPORT (OUTPATIENT)
Dept: PULMONOLOGY | Facility: HOSPITAL | Age: 78
End: 2023-11-13
Attending: INTERNAL MEDICINE
Payer: COMMERCIAL

## 2023-11-13 DIAGNOSIS — J44.9 CHRONIC OBSTRUCTIVE PULMONARY DISEASE, UNSPECIFIED COPD TYPE (HCC): Primary | ICD-10-CM

## 2023-11-13 PROCEDURE — 94626 PHY/QHP OP PULM RHB W/MNTR: CPT

## 2023-11-15 ENCOUNTER — CLINICAL SUPPORT (OUTPATIENT)
Dept: PULMONOLOGY | Facility: HOSPITAL | Age: 78
End: 2023-11-15
Attending: INTERNAL MEDICINE
Payer: COMMERCIAL

## 2023-11-15 DIAGNOSIS — J44.9 CHRONIC OBSTRUCTIVE PULMONARY DISEASE, UNSPECIFIED COPD TYPE (HCC): ICD-10-CM

## 2023-11-15 PROCEDURE — 94626 PHY/QHP OP PULM RHB W/MNTR: CPT

## 2023-11-17 ENCOUNTER — PATIENT OUTREACH (OUTPATIENT)
Dept: CASE MANAGEMENT | Facility: OTHER | Age: 78
End: 2023-11-17

## 2023-11-17 ENCOUNTER — CLINICAL SUPPORT (OUTPATIENT)
Dept: PULMONOLOGY | Facility: HOSPITAL | Age: 78
End: 2023-11-17
Attending: INTERNAL MEDICINE
Payer: COMMERCIAL

## 2023-11-17 DIAGNOSIS — J44.9 CHRONIC OBSTRUCTIVE PULMONARY DISEASE, UNSPECIFIED COPD TYPE (HCC): ICD-10-CM

## 2023-11-17 PROCEDURE — 94626 PHY/QHP OP PULM RHB W/MNTR: CPT

## 2023-11-17 NOTE — PROGRESS NOTES
Shlomo's daughter was contacted to discuss his breathing status, use of nebulized and inhaled medication and Pulmonary Rehab. Amanda Ellison stated her dad lives with her family and he is doing well. He drives during the day and continues attending Pulmonary Rehab, as per Pulmonary sugesstions. He uses oxygen and has a portable O2  device for appts. He understands his medication and the proper usage. Fritz Ingram is looking forward to a cochlear implant to improve his hearing and is keeping himself healthy, following MD advice. Amanda Ellison mentioned she wasn't sure if the COPD and [employer] information was received. She agrees to another RT follow-up call in 2-3 weeks.

## 2023-11-22 ENCOUNTER — CLINICAL SUPPORT (OUTPATIENT)
Dept: PULMONOLOGY | Facility: HOSPITAL | Age: 78
End: 2023-11-22
Attending: INTERNAL MEDICINE
Payer: COMMERCIAL

## 2023-11-22 DIAGNOSIS — J44.9 CHRONIC OBSTRUCTIVE PULMONARY DISEASE, UNSPECIFIED COPD TYPE (HCC): ICD-10-CM

## 2023-11-22 LAB
ALL TARGETS: NOT DETECTED
BACTERIA BLD CULT: ABNORMAL
BACTERIA BLD CULT: ABNORMAL
GRAM STN SPEC: ABNORMAL
GRAM STN SPEC: ABNORMAL

## 2023-11-22 PROCEDURE — 94625 PHY/QHP OP PULM RHB W/O MNTR: CPT

## 2023-11-24 ENCOUNTER — HOSPITAL ENCOUNTER (OUTPATIENT)
Dept: RADIOLOGY | Facility: HOSPITAL | Age: 78
End: 2023-11-24
Payer: COMMERCIAL

## 2023-11-24 ENCOUNTER — APPOINTMENT (OUTPATIENT)
Dept: PULMONOLOGY | Facility: HOSPITAL | Age: 78
End: 2023-11-24
Attending: INTERNAL MEDICINE
Payer: COMMERCIAL

## 2023-11-24 DIAGNOSIS — R09.02 HYPOXIA: ICD-10-CM

## 2023-11-24 DIAGNOSIS — J18.9 PNEUMONIA OF RIGHT LOWER LOBE DUE TO INFECTIOUS ORGANISM: ICD-10-CM

## 2023-11-24 DIAGNOSIS — J44.1 COPD WITH ACUTE EXACERBATION (HCC): ICD-10-CM

## 2023-11-24 DIAGNOSIS — J96.11 CHRONIC RESPIRATORY FAILURE WITH HYPOXIA (HCC): ICD-10-CM

## 2023-11-24 PROCEDURE — 71046 X-RAY EXAM CHEST 2 VIEWS: CPT

## 2023-11-26 DIAGNOSIS — K21.9 GASTROESOPHAGEAL REFLUX DISEASE, UNSPECIFIED WHETHER ESOPHAGITIS PRESENT: ICD-10-CM

## 2023-11-27 ENCOUNTER — TELEPHONE (OUTPATIENT)
Dept: FAMILY MEDICINE CLINIC | Facility: CLINIC | Age: 78
End: 2023-11-27

## 2023-11-27 DIAGNOSIS — J44.1 COPD WITH ACUTE EXACERBATION (HCC): Primary | ICD-10-CM

## 2023-11-27 RX ORDER — OMEPRAZOLE 20 MG/1
20 CAPSULE, DELAYED RELEASE ORAL DAILY
Qty: 30 CAPSULE | Refills: 3 | Status: SHIPPED | OUTPATIENT
Start: 2023-11-27

## 2023-11-27 RX ORDER — PREDNISONE 20 MG/1
40 TABLET ORAL DAILY
Qty: 10 TABLET | Refills: 0 | Status: SHIPPED | OUTPATIENT
Start: 2023-11-27 | End: 2023-12-09

## 2023-11-28 DIAGNOSIS — J44.9 CHRONIC OBSTRUCTIVE PULMONARY DISEASE, UNSPECIFIED COPD TYPE (HCC): ICD-10-CM

## 2023-11-28 DIAGNOSIS — G89.29 CHRONIC RIGHT-SIDED THORACIC BACK PAIN: ICD-10-CM

## 2023-11-28 DIAGNOSIS — M54.6 CHRONIC RIGHT-SIDED THORACIC BACK PAIN: ICD-10-CM

## 2023-11-28 DIAGNOSIS — G58.8 INTERCOSTAL NEURALGIA: ICD-10-CM

## 2023-11-28 DIAGNOSIS — G89.4 CHRONIC PAIN SYNDROME: ICD-10-CM

## 2023-11-28 RX ORDER — GABAPENTIN 300 MG/1
300 CAPSULE ORAL
Qty: 90 CAPSULE | Refills: 0 | Status: SHIPPED | OUTPATIENT
Start: 2023-11-28

## 2023-11-28 RX ORDER — FORMOTEROL FUMARATE 20 UG/2ML
20 SOLUTION RESPIRATORY (INHALATION) 2 TIMES DAILY
Qty: 180 ML | Refills: 0 | Status: SHIPPED | OUTPATIENT
Start: 2023-11-28

## 2023-11-28 NOTE — TELEPHONE ENCOUNTER
Patient requesting refill(s) of: Gabapentin 300 mg     Last filled: 05/03/23  Last appt: 11/01/23  Next appt: None  Pharmacy: [facility]    Patient requesting refill(s) of: Formoterol 20 mcg/2ml nebulizer solution    Last filled: 07/04/23

## 2023-11-29 ENCOUNTER — OFFICE VISIT (OUTPATIENT)
Dept: PODIATRY | Facility: CLINIC | Age: 78
End: 2023-11-29
Payer: COMMERCIAL

## 2023-11-29 ENCOUNTER — CLINICAL SUPPORT (OUTPATIENT)
Dept: PULMONOLOGY | Facility: HOSPITAL | Age: 78
End: 2023-11-29
Attending: INTERNAL MEDICINE
Payer: COMMERCIAL

## 2023-11-29 VITALS
DIASTOLIC BLOOD PRESSURE: 54 MMHG | SYSTOLIC BLOOD PRESSURE: 105 MMHG | BODY MASS INDEX: 26.61 KG/M2 | HEART RATE: 104 BPM | WEIGHT: 175.6 LBS | HEIGHT: 68 IN

## 2023-11-29 DIAGNOSIS — J44.9 CHRONIC OBSTRUCTIVE PULMONARY DISEASE, UNSPECIFIED COPD TYPE (HCC): Primary | ICD-10-CM

## 2023-11-29 DIAGNOSIS — I87.2 VENOUS INSUFFICIENCY OF BOTH LOWER EXTREMITIES: ICD-10-CM

## 2023-11-29 DIAGNOSIS — B35.1 ONYCHOMYCOSIS: Primary | ICD-10-CM

## 2023-11-29 PROCEDURE — 94626 PHY/QHP OP PULM RHB W/MNTR: CPT

## 2023-11-29 PROCEDURE — 11721 DEBRIDE NAIL 6 OR MORE: CPT | Performed by: STUDENT IN AN ORGANIZED HEALTH CARE EDUCATION/TRAINING PROGRAM

## 2023-11-30 NOTE — PROGRESS NOTES
Assessment/Plan:    No problem-specific Assessment & Plan notes found for this encounter. Diagnoses and all orders for this visit:    Onychomycosis    Venous insufficiency of both lower extremities      Plan:     1. A thorough neurovascular exam was performed. Patient presents for at-risk foot care. Patient has no acute concerns today. Patient has significant lower extremity risk due to diminished PT pulses in the feet and trophic skin changes to the lower extremity including thick toenail, atrophic skin, and decreased hair growth. 2. All 10 toenails debrided Using nail nipper, wes, and curette, reduced in thickness and length. Devitalized nail tissue and fungal debris excised and removed. Patient tolerated well. 3. Patient was educated on importance of daily foot assessment and proper shoe gear. Educational materials were provided. Patient has Q8  findings and is recommended for at risk foot care every 10-12 weeks. I recommended over-the-counter lotion to be applied daily twice a day to bilateral lower extremities. 4. Call if any questions or occurrence of any foot and ankle related complications      5. Return in 3 months      Subjective:      Patient ID: Franky Sprain is a 77 y.o. male. HPI:  Franky Sprain is a 77 y.o. male who presents with painful, elongated toenails. They have difficulty applying their socks and shoes due to the elongation of the nails. The pressure within their shoe gear is painful and they have been unable to cut their nails adequately. Patient states pain is 1/10 in shoe gear. Pain with pressure. Requires at risk foot care.            The following portions of the patient's history were reviewed and updated as appropriate: He  has a past medical history of Abnormal ECG (2021 OR 2022), Alcoholism ([address]) (1984), Arthritis (2008), Bilateral carotid artery stenosis, Callus, Cancer ([address]), Chronic diastolic heart failure ([address]), Chronic ischemic heart disease, Chronic kidney disease, Colon polyp, COPD (chronic obstructive pulmonary disease) ([address]), Coronary artery disease, COVID (11/2021), CPAP (continuous positive airway pressure) dependence, Disease of thyroid gland (2017), Emphysema of lung ([address]) (1995), Hearing loss, History of transfusion (1995), Hyperlipidemia, Hypertension, Intracranial aneurysm (04/25/2023), Lung nodule (2023), MI (myocardial infarction) ([address]) (1995), Myocardial infarction ([address]) (1995), Pneumonia, Pneumothorax (02/20/2023), Prostate cancer ([address]), RSV (respiratory syncytial virus infection) (10/2022), S/P carotid endarterectomy, Shortness of breath, Sleep apnea, Sleep apnea, obstructive, and Stented coronary artery.   He   Patient Active Problem List    Diagnosis Date Noted    Chronic systolic congestive heart failure ([address]) 10/22/2023    Erectile dysfunction 08/16/2023    Other disorders of refraction 08/16/2023    Occlusion and stenosis of unspecified carotid artery 08/16/2023    Dysphagia 05/04/2023    Congestive heart failure with left ventricular systolic dysfunction (LVSD) ([address]) 04/28/2023    Hypoxemia 04/28/2023    Intracranial aneurysm 04/25/2023    Allergic rhinitis, unspecified 04/17/2023    Chronic kidney disease, stage 3a ([address]) 04/17/2023    Generalized muscle weakness 04/17/2023    Hypertensive heart and chronic kidney disease with heart failure and stage 1 through stage 4 chronic kidney disease, or unspecified chronic kidney disease ([address]) 04/17/2023    Need for assistance with personal care 04/17/2023    Other abnormalities of gait and mobility 04/17/2023    Retention of urine, unspecified 04/17/2023    Sudden idiopathic hearing loss, right ear 04/17/2023    Acute and chronic respiratory failure with hypoxia ([address]) 04/17/2023    Elevated troponin 04/04/2023    Orthopnea 03/21/2023    Lower extremity edema 03/21/2023    Irregular heart rhythm 03/21/2023    Pneumonia of right lower lobe due to infectious organism 03/15/2023    Leukocytosis 02/21/2023    Left leg pain 01/25/2023    Transient right leg weakness 01/25/2023    Hypokalemia 01/11/2023    Ambulatory dysfunction 01/09/2023    COPD (chronic obstructive pulmonary disease) ([address]) 01/09/2023    Sensorineural hearing loss, bilateral 10/19/2022    Chronic respiratory failure with hypoxia ([address]) 10/15/2022    Prostate cancer ([address]) 09/27/2022    Elevated MCV 09/08/2022    Cubital tunnel syndrome on left 07/22/2022    Carpal tunnel syndrome on left 07/22/2022    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    Urinary frequency 04/27/2022    Incomplete bladder emptying 04/27/2022    Chronic bilateral low back pain with right-sided sciatica 04/18/2022    Mid back pain 04/18/2022    Thoracic radiculopathy 04/18/2022    Chronic pain syndrome 04/18/2022    Hoarseness or changing voice 04/14/2022    Chronic right-sided thoracic back pain 03/05/2022    Primary insomnia 03/02/2022    Right hip pain 01/20/2022    Abnormal nuclear stress test 03/18/2021    Costochondral chest pain 01/15/2021    COPD with acute exacerbation ([address]) 01/11/2021    Oxygen dependent 01/11/2021    S/P carotid endarterectomy 01/11/2021    Stented coronary artery 01/11/2021    Vertigo 01/11/2021    Anisometropia 01/11/2021    Osteoarthritis of spinal facet joint 01/11/2021    Chronic ischemic heart disease 01/11/2021    Diverticular disease of colon 01/11/2021    Dry eye syndrome 01/11/2021    GERD (gastroesophageal reflux disease) 01/11/2021    Acute hearing loss, right 01/11/2021    Elevated PSA 01/11/2021    Hypoxia 01/11/2021    Lens replaced by other means 01/11/2021    Lumbar radiculopathy 01/11/2021    Multinodular goiter 01/11/2021    Nuclear senile cataract 01/11/2021    Obesity 01/11/2021    Occlusion and stenosis of carotid artery 01/11/2021    Osteoarthritis of hip 01/11/2021    Prediabetes 01/11/2021    LAMBERTO on CPAP 01/11/2021    Solitary pulmonary nodule 01/11/2021    Thyroid nodule 01/11/2021    Guyon syndrome, 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 Depression, recurrent ([address])     Hyperlipidemia     Coronary artery disease involving native coronary artery of native heart without angina pectoris     Left carotid artery occlusion      He  has a past surgical history that includes Colonoscopy; Skin cancer excision (2012); Eye surgery; Cardiac catheterization (03/18/2021); Coronary angioplasty with stent (1999); Coronary angioplasty with stent (2001); Coronary angioplasty with stent (2003); Appendectomy; pr neuroplasty &/transposition ulnar nerve elbow (Left, 07/22/2022); pr neuroplasty &/transpos median nrv carpal tunne (Left, 07/22/2022); pr neuroplasty &/transposition ulnar nerve wrist (Left, 07/22/2022); pr bx prostate strtctc saturation sampling img gid (N/A, 09/13/2022); Cataract extraction, bilateral (Bilateral); and Tonsillectomy (no).   Current Outpatient Medications   Medication Sig Dispense Refill    ALPRAZolam (XANAX) 0.25 mg tablet Take 1 tablet (0.25 mg total) by mouth daily at bedtime as needed for anxiety 15 tablet 0    aspirin 81 mg chewable tablet 81 mg daily at bedtime      azithromycin (ZITHROMAX) 250 mg tablet Take 2 tablets (500 mg total) by mouth 3 (three) times a week Monday, Wednesday, Friday 72 tablet 0    bacitracin ophthalmic ointment       budesonide (PULMICORT) 0.5 mg/2 mL nebulizer solution inhale contents of 1 vial ( 2 milliliters ) in nebulizer twice a day Rinse mouth after use 180 mL 0    cholecalciferol (VITAMIN D3) 1,000 units tablet Take 1,000 Units by mouth daily      dextromethorphan-guaiFENesin (ROBITUSSIN DM)  mg/5 mL syrup Take 5 mL by mouth 3 (three) times a day as needed for cough 354 mL 0    Empagliflozin (Jardiance) 10 MG TABS tablet Take 1 tablet (10 mg total) by mouth every morning 90 tablet 5    famotidine (PEPCID) 20 mg tablet TAKE 1 TABLET BY MOUTH DAILY AT  BEDTIME 100 tablet 2    formoterol (PERFOROMIST) 20 MCG/2ML nebulizer solution Take 2 mL (20 mcg total) by nebulization 2 (two) times a day 180 mL 0 furosemide (LASIX) 40 mg tablet Take 0.5 tablets (20 mg total) by mouth daily 90 tablet 2    gabapentin (NEURONTIN) 300 mg capsule Take 1 capsule (300 mg total) by mouth daily at bedtime 90 capsule 0    Humidifier MISC Use continuous 1 each 0    HYDROcodone-acetaminophen (Norco) 5-325 mg per tablet Take 1 tablet by mouth every 6 (six) hours as needed for pain Max Daily Amount: 4 tablets 15 tablet 0    ipratropium-albuterol (DUO-NEB) 0.5-2.5 mg/3 mL nebulizer solution       methocarbamol (ROBAXIN) 500 mg tablet Take 1 tablet (500 mg total) by mouth 3 (three) times a day as needed for muscle spasms 30 tablet 0    metoprolol succinate (TOPROL-XL) 25 mg 24 hr tablet Take 0.5 tablets (12.5 mg total) by mouth 2 (two) times a day 30 tablet 0    naloxone (NARCAN) 4 mg/0.1 mL nasal spray Administer 1 spray into a nostril. If no response after 2-3 minutes, give another dose in the other nostril using a new spray. 1 each 1    nystatin (MYCOSTATIN) 500,000 units/5 mL suspension 5 ml orally (swish and spit/swallow) every four hours while awake 180 mL 0    omeprazole (PriLOSEC) 20 mg delayed release capsule take 1 capsule by mouth once daily 30 capsule 3    oxygen gas Inhale 2 L/min continuous 2LPM at rest and 3-4 LPM with activity per D Cedric FANG notes      predniSONE 20 mg tablet Take 2 tablets (40 mg total) by mouth daily for 5 days 10 tablet 0    rosuvastatin (CRESTOR) 40 MG tablet TAKE 1 TABLET DAILY (Patient taking differently: Take 40 mg by mouth daily) 100 tablet 3     No current facility-administered medications for this visit. .    Review of Systems   All other systems reviewed and are negative. Objective:      /54 (BP Location: Left arm, Patient Position: Sitting, Cuff Size: Large)   Pulse 104   Ht 5' 8" (1.727 m)   Wt 79.7 kg (175 lb 9.6 oz)   BMI 26.70 kg/m²          Physical Exam  Vitals reviewed. Feet:      Comments:  On exam patient has thickened, hypertrophic, discolored, brittle toenails with subungual debris and tenderness x10. Patient has lower extremity edema. Patients skin is atrophic, thickened nails, and decreased pedal hair.  Patient has decreased pinprick and vibratory sensation to his feet and parasthesia

## 2023-12-01 ENCOUNTER — APPOINTMENT (EMERGENCY)
Dept: RADIOLOGY | Facility: HOSPITAL | Age: 78
DRG: 177 | End: 2023-12-01
Payer: COMMERCIAL

## 2023-12-01 ENCOUNTER — APPOINTMENT (EMERGENCY)
Dept: CT IMAGING | Facility: HOSPITAL | Age: 78
DRG: 177 | End: 2023-12-01
Payer: COMMERCIAL

## 2023-12-01 ENCOUNTER — HOSPITAL ENCOUNTER (INPATIENT)
Facility: HOSPITAL | Age: 78
LOS: 7 days | Discharge: HOME HEALTH | DRG: 177 | End: 2023-12-09
Attending: EMERGENCY MEDICINE | Admitting: INTERNAL MEDICINE
Payer: COMMERCIAL

## 2023-12-01 DIAGNOSIS — J44.1 COPD EXACERBATION (HCC): Primary | ICD-10-CM

## 2023-12-01 DIAGNOSIS — U07.1 COVID-19: ICD-10-CM

## 2023-12-01 LAB
ALBUMIN SERPL BCP-MCNC: 3.6 G/DL (ref 3.5–5)
ALP SERPL-CCNC: 53 U/L (ref 34–104)
ALT SERPL W P-5'-P-CCNC: 23 U/L (ref 7–52)
ANION GAP SERPL CALCULATED.3IONS-SCNC: 6 MMOL/L
APTT PPP: 25 SECONDS (ref 23–37)
AST SERPL W P-5'-P-CCNC: 25 U/L (ref 13–39)
BASOPHILS # BLD AUTO: 0.02 THOUSANDS/ÂΜL (ref 0–0.1)
BASOPHILS NFR BLD AUTO: 0 % (ref 0–1)
BILIRUB SERPL-MCNC: 0.66 MG/DL (ref 0.2–1)
BUN SERPL-MCNC: 19 MG/DL (ref 5–25)
CALCIUM SERPL-MCNC: 8.7 MG/DL (ref 8.4–10.2)
CHLORIDE SERPL-SCNC: 100 MMOL/L (ref 96–108)
CO2 SERPL-SCNC: 36 MMOL/L (ref 21–32)
CREAT SERPL-MCNC: 1.07 MG/DL (ref 0.6–1.3)
EOSINOPHIL # BLD AUTO: 0 THOUSAND/ÂΜL (ref 0–0.61)
EOSINOPHIL NFR BLD AUTO: 0 % (ref 0–6)
ERYTHROCYTE [DISTWIDTH] IN BLOOD BY AUTOMATED COUNT: 16.5 % (ref 11.6–15.1)
FLUAV RNA RESP QL NAA+PROBE: NEGATIVE
FLUBV RNA RESP QL NAA+PROBE: NEGATIVE
GFR SERPL CREATININE-BSD FRML MDRD: 65 ML/MIN/1.73SQ M
GLUCOSE SERPL-MCNC: 161 MG/DL (ref 65–140)
HCT VFR BLD AUTO: 42.1 % (ref 36.5–49.3)
HGB BLD-MCNC: 13 G/DL (ref 12–17)
IMM GRANULOCYTES # BLD AUTO: 0.05 THOUSAND/UL (ref 0–0.2)
IMM GRANULOCYTES NFR BLD AUTO: 0 % (ref 0–2)
INR PPP: 0.97 (ref 0.84–1.19)
LACTATE SERPL-SCNC: 1.4 MMOL/L (ref 0.5–2)
LACTATE SERPL-SCNC: 2.8 MMOL/L (ref 0.5–2)
LYMPHOCYTES # BLD AUTO: 0.57 THOUSANDS/ÂΜL (ref 0.6–4.47)
LYMPHOCYTES NFR BLD AUTO: 5 % (ref 14–44)
MCH RBC QN AUTO: 31.6 PG (ref 26.8–34.3)
MCHC RBC AUTO-ENTMCNC: 30.9 G/DL (ref 31.4–37.4)
MCV RBC AUTO: 102 FL (ref 82–98)
MONOCYTES # BLD AUTO: 1.2 THOUSAND/ÂΜL (ref 0.17–1.22)
MONOCYTES NFR BLD AUTO: 10 % (ref 4–12)
NEUTROPHILS # BLD AUTO: 10.53 THOUSANDS/ÂΜL (ref 1.85–7.62)
NEUTS SEG NFR BLD AUTO: 85 % (ref 43–75)
NRBC BLD AUTO-RTO: 0 /100 WBCS
PLATELET # BLD AUTO: 246 THOUSANDS/UL (ref 149–390)
PMV BLD AUTO: 10.8 FL (ref 8.9–12.7)
POTASSIUM SERPL-SCNC: 3.2 MMOL/L (ref 3.5–5.3)
PROT SERPL-MCNC: 5.6 G/DL (ref 6.4–8.4)
PROTHROMBIN TIME: 12.9 SECONDS (ref 11.6–14.5)
RBC # BLD AUTO: 4.12 MILLION/UL (ref 3.88–5.62)
RSV RNA RESP QL NAA+PROBE: NEGATIVE
SARS-COV-2 RNA RESP QL NAA+PROBE: POSITIVE
SODIUM SERPL-SCNC: 142 MMOL/L (ref 135–147)
WBC # BLD AUTO: 12.37 THOUSAND/UL (ref 4.31–10.16)

## 2023-12-01 PROCEDURE — 80053 COMPREHEN METABOLIC PANEL: CPT | Performed by: EMERGENCY MEDICINE

## 2023-12-01 PROCEDURE — 94664 DEMO&/EVAL PT USE INHALER: CPT

## 2023-12-01 PROCEDURE — 94760 N-INVAS EAR/PLS OXIMETRY 1: CPT

## 2023-12-01 PROCEDURE — 71046 X-RAY EXAM CHEST 2 VIEWS: CPT

## 2023-12-01 PROCEDURE — 94640 AIRWAY INHALATION TREATMENT: CPT

## 2023-12-01 PROCEDURE — 85610 PROTHROMBIN TIME: CPT | Performed by: EMERGENCY MEDICINE

## 2023-12-01 PROCEDURE — 36415 COLL VENOUS BLD VENIPUNCTURE: CPT | Performed by: EMERGENCY MEDICINE

## 2023-12-01 PROCEDURE — 94644 CONT INHLJ TX 1ST HOUR: CPT

## 2023-12-01 PROCEDURE — 87040 BLOOD CULTURE FOR BACTERIA: CPT | Performed by: EMERGENCY MEDICINE

## 2023-12-01 PROCEDURE — 85730 THROMBOPLASTIN TIME PARTIAL: CPT | Performed by: EMERGENCY MEDICINE

## 2023-12-01 PROCEDURE — XW043E5 INTRODUCTION OF REMDESIVIR ANTI-INFECTIVE INTO CENTRAL VEIN, PERCUTANEOUS APPROACH, NEW TECHNOLOGY GROUP 5: ICD-10-PCS | Performed by: INTERNAL MEDICINE

## 2023-12-01 PROCEDURE — 99223 1ST HOSP IP/OBS HIGH 75: CPT | Performed by: INTERNAL MEDICINE

## 2023-12-01 PROCEDURE — 83605 ASSAY OF LACTIC ACID: CPT | Performed by: EMERGENCY MEDICINE

## 2023-12-01 PROCEDURE — 93005 ELECTROCARDIOGRAM TRACING: CPT

## 2023-12-01 PROCEDURE — 85025 COMPLETE CBC W/AUTO DIFF WBC: CPT | Performed by: EMERGENCY MEDICINE

## 2023-12-01 PROCEDURE — 71250 CT THORAX DX C-: CPT

## 2023-12-01 PROCEDURE — 99285 EMERGENCY DEPT VISIT HI MDM: CPT | Performed by: EMERGENCY MEDICINE

## 2023-12-01 PROCEDURE — 99285 EMERGENCY DEPT VISIT HI MDM: CPT

## 2023-12-01 PROCEDURE — G1004 CDSM NDSC: HCPCS

## 2023-12-01 PROCEDURE — 0241U HB NFCT DS VIR RESP RNA 4 TRGT: CPT | Performed by: EMERGENCY MEDICINE

## 2023-12-01 PROCEDURE — 96360 HYDRATION IV INFUSION INIT: CPT

## 2023-12-01 RX ORDER — PANTOPRAZOLE SODIUM 40 MG/1
40 TABLET, DELAYED RELEASE ORAL
Status: DISCONTINUED | OUTPATIENT
Start: 2023-12-02 | End: 2023-12-09 | Stop reason: HOSPADM

## 2023-12-01 RX ORDER — AZITHROMYCIN 250 MG/1
500 TABLET, FILM COATED ORAL ONCE
Status: COMPLETED | OUTPATIENT
Start: 2023-12-01 | End: 2023-12-01

## 2023-12-01 RX ORDER — FAMOTIDINE 20 MG/1
20 TABLET, FILM COATED ORAL
Status: DISCONTINUED | OUTPATIENT
Start: 2023-12-01 | End: 2023-12-09 | Stop reason: HOSPADM

## 2023-12-01 RX ORDER — ACETAMINOPHEN 325 MG/1
650 TABLET ORAL EVERY 6 HOURS PRN
Status: DISCONTINUED | OUTPATIENT
Start: 2023-12-01 | End: 2023-12-09 | Stop reason: HOSPADM

## 2023-12-01 RX ORDER — ATORVASTATIN CALCIUM 80 MG/1
80 TABLET, FILM COATED ORAL
Status: DISCONTINUED | OUTPATIENT
Start: 2023-12-01 | End: 2023-12-09 | Stop reason: HOSPADM

## 2023-12-01 RX ORDER — GABAPENTIN 300 MG/1
300 CAPSULE ORAL
Status: DISCONTINUED | OUTPATIENT
Start: 2023-12-01 | End: 2023-12-09 | Stop reason: HOSPADM

## 2023-12-01 RX ORDER — FUROSEMIDE 20 MG/1
20 TABLET ORAL DAILY
Status: DISCONTINUED | OUTPATIENT
Start: 2023-12-02 | End: 2023-12-09 | Stop reason: HOSPADM

## 2023-12-01 RX ORDER — CEFTRIAXONE 1 G/50ML
1000 INJECTION, SOLUTION INTRAVENOUS EVERY 24 HOURS
Status: DISCONTINUED | OUTPATIENT
Start: 2023-12-01 | End: 2023-12-08

## 2023-12-01 RX ORDER — METOPROLOL SUCCINATE 25 MG/1
12.5 TABLET, EXTENDED RELEASE ORAL 2 TIMES DAILY
Status: DISCONTINUED | OUTPATIENT
Start: 2023-12-01 | End: 2023-12-09 | Stop reason: HOSPADM

## 2023-12-01 RX ORDER — HEPARIN SODIUM 5000 [USP'U]/ML
5000 INJECTION, SOLUTION INTRAVENOUS; SUBCUTANEOUS EVERY 8 HOURS SCHEDULED
Status: DISCONTINUED | OUTPATIENT
Start: 2023-12-01 | End: 2023-12-02

## 2023-12-01 RX ORDER — ALPRAZOLAM 0.25 MG/1
0.25 TABLET ORAL
Status: DISCONTINUED | OUTPATIENT
Start: 2023-12-01 | End: 2023-12-09 | Stop reason: HOSPADM

## 2023-12-01 RX ORDER — GUAIFENESIN 100 MG/5ML
200 SOLUTION ORAL EVERY 4 HOURS PRN
Status: DISCONTINUED | OUTPATIENT
Start: 2023-12-01 | End: 2023-12-05

## 2023-12-01 RX ORDER — SODIUM CHLORIDE FOR INHALATION 0.9 %
12 VIAL, NEBULIZER (ML) INHALATION ONCE
Status: COMPLETED | OUTPATIENT
Start: 2023-12-01 | End: 2023-12-01

## 2023-12-01 RX ORDER — GUAIFENESIN 600 MG/1
600 TABLET, EXTENDED RELEASE ORAL 2 TIMES DAILY
Status: DISCONTINUED | OUTPATIENT
Start: 2023-12-01 | End: 2023-12-09 | Stop reason: HOSPADM

## 2023-12-01 RX ORDER — METHOCARBAMOL 500 MG/1
500 TABLET, FILM COATED ORAL 3 TIMES DAILY PRN
Status: DISCONTINUED | OUTPATIENT
Start: 2023-12-01 | End: 2023-12-09 | Stop reason: HOSPADM

## 2023-12-01 RX ORDER — ASPIRIN 81 MG/1
81 TABLET, CHEWABLE ORAL DAILY
Status: DISCONTINUED | OUTPATIENT
Start: 2023-12-02 | End: 2023-12-09 | Stop reason: HOSPADM

## 2023-12-01 RX ORDER — METHYLPREDNISOLONE SODIUM SUCCINATE 40 MG/ML
40 INJECTION, POWDER, LYOPHILIZED, FOR SOLUTION INTRAMUSCULAR; INTRAVENOUS EVERY 8 HOURS
Status: DISCONTINUED | OUTPATIENT
Start: 2023-12-01 | End: 2023-12-02

## 2023-12-01 RX ORDER — AZITHROMYCIN 250 MG/1
500 TABLET, FILM COATED ORAL EVERY 24 HOURS
Status: COMPLETED | OUTPATIENT
Start: 2023-12-02 | End: 2023-12-04

## 2023-12-01 RX ORDER — LEVALBUTEROL INHALATION SOLUTION 1.25 MG/3ML
1.25 SOLUTION RESPIRATORY (INHALATION)
Status: DISCONTINUED | OUTPATIENT
Start: 2023-12-01 | End: 2023-12-07

## 2023-12-01 RX ADMIN — ALBUTEROL SULFATE 10 MG: 2.5 SOLUTION RESPIRATORY (INHALATION) at 14:46

## 2023-12-01 RX ADMIN — HEPARIN SODIUM 5000 UNITS: 5000 INJECTION INTRAVENOUS; SUBCUTANEOUS at 22:39

## 2023-12-01 RX ADMIN — GUAIFENESIN 600 MG: 600 TABLET ORAL at 18:36

## 2023-12-01 RX ADMIN — IPRATROPIUM BROMIDE 1 MG: 0.5 SOLUTION RESPIRATORY (INHALATION) at 14:46

## 2023-12-01 RX ADMIN — METHYLPREDNISOLONE SODIUM SUCCINATE 40 MG: 40 INJECTION, POWDER, FOR SOLUTION INTRAMUSCULAR; INTRAVENOUS at 18:36

## 2023-12-01 RX ADMIN — LEVALBUTEROL HYDROCHLORIDE 1.25 MG: 1.25 SOLUTION RESPIRATORY (INHALATION) at 20:15

## 2023-12-01 RX ADMIN — CEFTRIAXONE 1000 MG: 1 INJECTION, SOLUTION INTRAVENOUS at 20:20

## 2023-12-01 RX ADMIN — FAMOTIDINE 20 MG: 20 TABLET ORAL at 22:39

## 2023-12-01 RX ADMIN — GABAPENTIN 300 MG: 300 CAPSULE ORAL at 22:39

## 2023-12-01 RX ADMIN — ATORVASTATIN CALCIUM 80 MG: 80 TABLET, FILM COATED ORAL at 18:36

## 2023-12-01 RX ADMIN — SODIUM CHLORIDE 1000 ML: 0.9 INJECTION, SOLUTION INTRAVENOUS at 16:10

## 2023-12-01 RX ADMIN — GUAIFENESIN 200 MG: 200 SOLUTION ORAL at 23:24

## 2023-12-01 RX ADMIN — Medication 12 ML: at 14:46

## 2023-12-01 RX ADMIN — AZITHROMYCIN DIHYDRATE 500 MG: 250 TABLET ORAL at 17:28

## 2023-12-01 RX ADMIN — IPRATROPIUM BROMIDE 0.5 MG: 0.5 SOLUTION RESPIRATORY (INHALATION) at 20:15

## 2023-12-01 NOTE — RESPIRATORY THERAPY NOTE
RT Protocol Note  Jany Payne 77 y.o. male MRN: [id number]  Unit/Bed#: MS 36-1 Encounter: [id number]    Assessment    Principal Problem:    COPD with acute exacerbation ([address])  Active Problems:    Hyperlipidemia    GERD (gastroesophageal reflux disease)    LAMBERTO on CPAP    Acute and chronic respiratory failure with hypoxia (HCC)    Chronic systolic congestive heart failure ([address])      Home Pulmonary Medications:  0.5mg pulmicort BID  Perforamist BID  Duoneb QID  Home Devices/Therapy: BiPAP/CPAP    Past Medical History:   Diagnosis Date    Abnormal ECG 2021 OR 2022    Alcoholism ([address]) 1984    sober 38 years    Arthritis 2008    beginning    Bilateral carotid artery stenosis     Callus     Cancer (HCC)     skin    Chronic diastolic heart failure (HCC)     Chronic ischemic heart disease     Chronic kidney disease     stage 3    Colon polyp     COPD (chronic obstructive pulmonary disease) (HCC)     Coronary artery disease     hx stents, MI, PCI    COVID 11/2021    CPAP (continuous positive airway pressure) dependence     Disease of thyroid gland 2017    nodules    Emphysema of lung ([address]) 1995    started    Hearing loss     History of transfusion 1995    Hyperlipidemia     Hypertension     Intracranial aneurysm 04/25/2023    Lung nodule 2023    x rays    MI (myocardial infarction) ([address]) 1995    Myocardial infarction ([address]) 1995    Pneumonia     Pneumothorax 02/20/2023    collapsed lung    Prostate cancer ([address])     RSV (respiratory syncytial virus infection) 10/2022    S/P carotid endarterectomy     Shortness of breath     O2 2 l/nc PRN    Sleep apnea     Sleep apnea, obstructive     Stented coronary artery      Social History     Socioeconomic History    Marital status: /Civil Union     Spouse name: None    Number of children: None    Years of education: None    Highest education level: None   Occupational History    None   Tobacco Use    Smoking status: Former     Packs/day: 2.00     Years: 35.00     Total pack years: 70.00     Types: Cigarettes     Start date: 1960     Quit date: 1995     Years since quittin.4    Smokeless tobacco: Never    Tobacco comments:     stopped smoking the day i had a heart attack   Vaping Use    Vaping Use: Never used   Substance and Sexual Activity    Alcohol use: Not Currently    Drug use: Never    Sexual activity: Not Currently     Partners: Female     Birth control/protection: None   Other Topics Concern    None   Social History Narrative    None     Social Determinants of Health     Financial Resource Strain: Unknown (2023)    Overall Financial Resource Strain (CARDIA)     Difficulty of Paying Living Expenses: Patient refused   Food Insecurity: No Food Insecurity (10/19/2023)    Hunger Vital Sign     Worried About Running Out of Food in the Last Year: Never true     Ran Out of Food in the Last Year: Never true   Transportation Needs: No Transportation Needs (10/19/2023)    PRAPARE - Transportation     Lack of Transportation (Medical): No     Lack of Transportation (Non-Medical): No   Physical Activity: Not on file   Stress: Not on file   Social Connections: Not on file   Intimate Partner Violence: Not on file   Housing Stability: Low Risk  (10/19/2023)    Housing Stability Vital Sign     Unable to Pay for Housing in the Last Year: No     Number of Places Lived in the Last Year: 1     Unstable Housing in the Last Year: No       Subjective         Objective    Physical Exam:   Assessment Type: Assess only  General Appearance: Awake, Alert  Respiratory Pattern: Normal  Chest Assessment: Chest expansion symmetrical  Bilateral Breath Sounds: Expiratory wheezes    Vitals:  Blood pressure 106/63, pulse 98, temperature 97.5 °F (36.4 °C), resp. rate 16, height 5' 8" (1.727 m), weight 79.4 kg (175 lb), SpO2 94 %. Imaging and other studies: I have personally reviewed pertinent reports.             Plan    Respiratory Plan: Home Bronchodilator Patient pathway  Airway Clearance Plan: Discontinue Protocol     Resp Comments: (P) Pt evaluated per respiratory protocal. Pt with hx of COPD, seen in ED with increased O2 requirements, wears 2-3 l at home, on 6l. Pt given hour long UDN with some relief. Pt admitted to 3rd floor with exacerbatin of COPD. Will cont. pts home regimen and hs bipap.

## 2023-12-01 NOTE — H&P
[address]  H&P  Name: Jayla Little 77 y.o. male I MRN: [id number]  Unit/Bed#: MS 36-1 I Date of Admission: 12/1/2023   Date of Service: 12/1/2023 I Hospital Day: 0      Assessment/Plan   * COPD with acute exacerbation [facility])  Assessment & Plan  Patient presents with shortness of breath and wheezing  Requiring 5 L of nasal cannula supplemental O2 up from baseline of 2-3 L  Likely COPD with acute exacerbation  Solu-Medrol 40 mg IV every 8 hours scheduled  Atrovent and Xopenex  Respiratory and airway clearance protocols  Azithromycin 500 mg oral daily    Acute and chronic respiratory failure with hypoxia ([address])  Assessment & Plan  Present on admission as evidenced by patient requiring 5 L of nasal cannula supplemental O2  Patient requires 2-3 L of nasal cannula supplemental O2  Likely secondary to COPD exacerbation  Wean O2 as tolerated  Goal SpO2 greater than 88%  Respiratory protocol    Chronic systolic congestive heart failure ([address])  Assessment & Plan  Not in acute exacerbation  Continue home beta-blocker and Entresto  Outpatient follow-up with cardiology    LAMBERTO on CPAP  Assessment & Plan  CPAP at night    GERD (gastroesophageal reflux disease)  Assessment & Plan  Continue home PPI and H2 blocker    Hyperlipidemia  Assessment & Plan  Continue home statin therapy         VTE Prophylaxis: Heparin  Code Status: Level 3 DNR/DNI    Anticipated Length of Stay:  Patient will be admitted on an Observation basis with an anticipated length of stay of less than 2 midnights.    Justification for Hospital Stay: COPD exacerbation    Total Time for Visit, including Counseling / Coordination of Care: I have spent a total time of 45 minutes on 12/01/23 in caring for this patient including Diagnostic results, Prognosis, Risks and benefits of tx options, Instructions for management, Patient and family education, Importance of tx compliance, Risk factor reductions, Impressions, Counseling / Coordination of care, Documenting in the medical record, Reviewing / ordering tests, medicine, procedures  , Obtaining or reviewing history  , and Communicating with other healthcare professionals . Chief Complaint: Shortness of breath    History of Present Illness:    Ami Eddy is a 77 y.o. male with a past medical history significant for COPD, congestive heart failure, hyperlipidemia, GERD who presents with complaints of dyspnea on exertion and shortness of breath. In the ED, the patient was found to be requiring 5 L of nasal cannula supplemental O2 up from his baseline O2 requirement of 2-3 L of nasal cannula supplemental O2. Likely COPD with acute exacerbation. The patient was assigned to observation in the setting of COPD with acute exacerbation for IV corticosteroids and nebulizers. Review of Systems:    Review of Systems   Constitutional:  Negative for chills and fever. HENT:  Negative for ear pain and sore throat. Eyes:  Negative for pain and visual disturbance. Respiratory:  Positive for shortness of breath. Negative for cough. Cardiovascular:  Negative for chest pain and palpitations. Gastrointestinal:  Negative for abdominal pain and vomiting. Genitourinary:  Negative for dysuria and hematuria. Musculoskeletal:  Negative for arthralgias and back pain. Skin:  Negative for color change and rash. Neurological:  Negative for seizures and syncope. All other systems reviewed and are negative.       Past Medical and Surgical History:     Past Medical History:   Diagnosis Date    Abnormal ECG 2021 OR 2022    Alcoholism ([address]) 1984    sober 45 years    Arthritis 2008    beginning    Bilateral carotid artery stenosis     Callus     Cancer (HCC)     skin    Chronic diastolic heart failure (HCC)     Chronic ischemic heart disease     Chronic kidney disease     stage 3    Colon polyp     COPD (chronic obstructive pulmonary disease) (HCC)     Coronary artery disease     hx stents, MI, PCI    COVID 11/2021    CPAP (continuous positive airway pressure) dependence     Disease of thyroid gland 2017    nodules    Emphysema of lung ([address]) 1995    started    Hearing loss     History of transfusion 1995    Hyperlipidemia     Hypertension     Intracranial aneurysm 04/25/2023    Lung nodule 2023    x rays    MI (myocardial infarction) ([address]) 1995    Myocardial infarction ([address]) 1995    Pneumonia     Pneumothorax 02/20/2023    collapsed lung    Prostate cancer ([address])     RSV (respiratory syncytial virus infection) 10/2022    S/P carotid endarterectomy     Shortness of breath     O2 2 l/nc PRN    Sleep apnea     Sleep apnea, obstructive     Stented coronary artery        Past Surgical History:   Procedure Laterality Date    APPENDECTOMY      CARDIAC CATHETERIZATION  03/18/2021    left main with no significant disease, proximal LAD with 10% stenosis at the site of prior stent, left circumflex artery with minimal luminal irregularities mid RCA with 50% stenosis at site of prior stent and PL segment with distal disease supplied by collaterals from the distal circumflex with no significant CAD requiring revascularization at that time.     CATARACT EXTRACTION, BILATERAL Bilateral     COLONOSCOPY      CORONARY ANGIOPLASTY WITH STENT PLACEMENT  1999    RCA    CORONARY ANGIOPLASTY WITH STENT PLACEMENT  2001    RCA    CORONARY ANGIOPLASTY WITH STENT PLACEMENT  2003    LAD    EYE SURGERY      CT BX PROSTATE STRTCTC SATURATION SAMPLING IMG GID N/A 09/13/2022    Procedure: TRANSPERINEAL MRI FUSION  BIOPSY PROSTATE;  Surgeon: Savannah Johnson MD;  Location: [facility];  Service: Urology    CT NEUROPLASTY &/TRANSPOS MEDIAN NRV CARPAL Delmas [place] Left 07/22/2022    Procedure: RELEASE CARPAL TUNNEL;  Surgeon: Sandro Escobar MD;  Location: [facility];  Service: Orthopedics    CT NEUROPLASTY &/TRANSPOSITION ULNAR NERVE ELBOW Left 07/22/2022    Procedure: RELEASE CUBITAL TUNNEL;  Surgeon: Sandro Escobar MD;  Location: [facility];  Service: Orthopedics    GA NEUROPLASTY &/TRANSPOSITION ULNAR NERVE WRIST Left 07/22/2022    Procedure: GUYON'S CANAL RELEASE;  Surgeon: Susan Bradley MD;  Location: [facility];  Service: Orthopedics    SKIN CANCER EXCISION  2012    chin-per pt, basal cell  also right ankle    TONSILLECTOMY  no       Meds/Allergies:    Prior to Admission medications    Medication Sig Start Date End Date Taking?  Authorizing Provider   ALPRAZolam Dane Jackie) 0.25 mg tablet Take 1 tablet (0.25 mg total) by mouth daily at bedtime as needed for anxiety 11/22/23   Rakesh Mills DO   aspirin 81 mg chewable tablet 81 mg daily at bedtime    Historical Provider, MD   azithromycin (ZITHROMAX) 250 mg tablet Take 2 tablets (500 mg total) by mouth 3 (three) times a week Monday, Wednesday, Friday 11/1/23 1/30/24  Rakesh Mills DO   bacitracin ophthalmic ointment  10/10/23   Historical Provider, MD   budesonide (PULMICORT) 0.5 mg/2 mL nebulizer solution inhale contents of 1 vial ( 2 milliliters ) in nebulizer twice a day Rinse mouth after use 10/24/23   Rakesh Mills DO   cholecalciferol (VITAMIN D3) 1,000 units tablet Take 1,000 Units by mouth daily    Historical Provider, MD   dextromethorphan-guaiFENesin (ROBITUSSIN DM)  mg/5 mL syrup Take 5 mL by mouth 3 (three) times a day as needed for cough 2/13/23   Manny Steele PA-C   Empagliflozin (Jardiance) 10 MG TABS tablet Take 1 tablet (10 mg total) by mouth every morning 9/6/23 2/27/25  Jessica Guerrero MD   famotidine (PEPCID) 20 mg tablet TAKE 1 TABLET BY MOUTH DAILY AT  BEDTIME 6/23/23   Rakesh Mills DO   formoterol (PERFOROMIST) 20 MCG/2ML nebulizer solution Take 2 mL (20 mcg total) by nebulization 2 (two) times a day 11/28/23   Rakesh Mills DO   furosemide (LASIX) 40 mg tablet Take 0.5 tablets (20 mg total) by mouth daily 9/6/23   Jessica Guerrero MD   gabapentin (NEURONTIN) 300 mg capsule Take 1 capsule (300 mg total) by mouth daily at bedtime 11/28/23   Rakesh Mills DO Humidifier MISC Use continuous 11/28/23   INOCENTE Duque   HYDROcodone-acetaminophen (Norco) 5-325 mg per tablet Take 1 tablet by mouth every 6 (six) hours as needed for pain Max Daily Amount: 4 tablets 6/30/23   Fern Lunch, DO   ipratropium-albuterol (DUO-NEB) 0.5-2.5 mg/3 mL nebulizer solution  8/14/23   Historical Provider, MD   methocarbamol (ROBAXIN) 500 mg tablet Take 1 tablet (500 mg total) by mouth 3 (three) times a day as needed for muscle spasms 7/23/23   Fern Lunch, DO   metoprolol succinate (TOPROL-XL) 25 mg 24 hr tablet Take 0.5 tablets (12.5 mg total) by mouth 2 (two) times a day 10/27/23   Denise Rao MD   naloxone [facility]) 4 mg/0.1 mL nasal spray Administer 1 spray into a nostril. If no response after 2-3 minutes, give another dose in the other nostril using a new spray. 6/30/23 6/29/24  Fern Lunch, DO   nystatin (MYCOSTATIN) 500,000 units/5 mL suspension 5 ml orally (swish and spit/swallow) every four hours while awake 11/28/23   Rachel Johnson Dan[place], DO   omeprazole (PriLOSEC) 20 mg delayed release capsule take 1 capsule by mouth once daily 11/27/23   Sherry Rodrigez PA-C   oxygen gas Inhale 2 L/min continuous 2LPM at rest and 3-4 LPM with activity per D Cedric FANG notes    Historical Provider, MD   predniSONE 20 mg tablet Take 2 tablets (40 mg total) by mouth daily for 5 days 11/27/23 12/2/23  Fern Lunch, DO   rosuvastatin (CRESTOR) 40 MG tablet TAKE 1 TABLET DAILY  Patient taking differently: Take 40 mg by mouth daily 11/21/22   Fern Lunch, DO   Ascorbic Acid (vitamin C) 1000 MG tablet Take 1,000 mg by mouth daily  Patient not taking: No sig reported  10/15/22  Historical Provider, MD       Allergies:    Allergies   Allergen Reactions    Lisinopril Swelling and Cough    Tetanus Antitoxin Anaphylaxis    Tetanus Toxoid Anaphylaxis and Swelling       Social History:     Marital Status: /Civil Union   Substance Use History:   Social History Substance and Sexual Activity   Alcohol Use Not Currently     Social History     Tobacco Use   Smoking Status Former    Packs/day: 2.00    Years: 35.00    Total pack years: 70.00    Types: Cigarettes    Start date: 1960    Quit date: 1995    Years since quittin.4   Smokeless Tobacco Never   Tobacco Comments    stopped smoking the day i had a heart attack     Social History     Substance and Sexual Activity   Drug Use Never       Family History:    Pertinent family history reviewed    Physical Exam:     Vitals:   Blood Pressure: 106/63 (23 1820)  Pulse: 98 (23)  Temperature: 97.5 °F (36.4 °C) (23)  Temp Source: Tympanic (23 1431)  Respirations: 16 (23)  Height: 5' 8" (172.7 cm) (23 142)  Weight - Scale: 79.4 kg (175 lb) (23 142)  SpO2: 94 % (23)    Physical Exam  Vitals and nursing note reviewed. Constitutional:       General: He is not in acute distress. Appearance: He is well-developed. HENT:      Head: Normocephalic and atraumatic. Eyes:      Conjunctiva/sclera: Conjunctivae normal.   Cardiovascular:      Rate and Rhythm: Normal rate and regular rhythm. Heart sounds: No murmur heard. Pulmonary:      Effort: Pulmonary effort is normal. No respiratory distress. Breath sounds: Wheezing present. Abdominal:      Palpations: Abdomen is soft. Tenderness: There is no abdominal tenderness. Musculoskeletal:         General: No swelling. Cervical back: Neck supple. Skin:     General: Skin is warm and dry. Capillary Refill: Capillary refill takes less than 2 seconds. Neurological:      Mental Status: He is alert.    Psychiatric:         Mood and Affect: Mood normal.          Additional Data:     Lab Results: I have reviewed pertinent results     Results from last 7 days   Lab Units 23  1452   WBC Thousand/uL 12.37*   HEMOGLOBIN g/dL 13.0   HEMATOCRIT % 42.1   PLATELETS Thousands/uL 246 NEUTROS PCT % 85*   LYMPHS PCT % 5*   MONOS PCT % 10   EOS PCT % 0     Results from last 7 days   Lab Units 12/01/23  1452   SODIUM mmol/L 142   POTASSIUM mmol/L 3.2*   CHLORIDE mmol/L 100   CO2 mmol/L 36*   BUN mg/dL 19   CREATININE mg/dL 1.07   ANION GAP mmol/L 6   CALCIUM mg/dL 8.7   ALBUMIN g/dL 3.6   TOTAL BILIRUBIN mg/dL 0.66   ALK PHOS U/L 53   ALT U/L 23   AST U/L 25   GLUCOSE RANDOM mg/dL 161*     Results from last 7 days   Lab Units 12/01/23  1452   INR  0.97             Results from last 7 days   Lab Units 12/01/23  1452   LACTIC ACID mmol/L 2.8*       Imaging: I have reviewed pertinent imaging     CT chest without contrast   Final Result by Lambert Cornelius MD (12/01 1655)      1. No acute abnormality. 2.  Improved bilateral pleural effusions with stable bibasilar patchy opacity, right greater than left, likely chronic atelectasis with calcification or aspirated contrast/barium. Workstation performed: [id number]         XR chest 2 views   Final Result by Zana Littlejohn MD (12/01 0158)      Near complete resolution of previously noted bibasilar opacities. However, there continues to be superimposed mild bibasilar interstitial and small patchy opacities. This likely represents a combination of mild interstitial pulmonary edema and bibasilar    infection/inflammation, such as from recurrent aspiration pneumonia. Stable emphysematous changes. Small bilateral pleural effusions. Resident: Brooke Dow, the attending radiologist, have reviewed the images and agree with the final report above. Workstation performed: [id number]             EKG, Pathology, and Other Studies Reviewed on Admission:   EKG: NSR    Allscripts / Epic Records Reviewed    ** Please Note: This note has been constructed using a voice recognition system.  **

## 2023-12-01 NOTE — ASSESSMENT & PLAN NOTE
Patient presents with shortness of breath and wheezing  Requiring 5 L of nasal cannula supplemental O2 up from baseline of 2-3 L  Likely COPD with acute exacerbation  Solu-Medrol 40 mg IV every 8 hours scheduled  Atrovent and Xopenex  Respiratory and airway clearance protocols  Azithromycin 500 mg oral daily

## 2023-12-01 NOTE — ED PROVIDER NOTES
History  Chief Complaint   Patient presents with    Shortness of Breath     Copd exacerbation  On prednisone with no improvement       Patient is a 79-year-old male with history of COPD who presents for evaluation of shortness of breath. Symptoms have progressing since Wednesday. He says it feels similar to COPD that he has had in the past.  He admits to a cough, denies any fevers, chills. Denies any chest pain, abdominal pain. Patient says that he took 3 breathing treatments at home and it helped for a short period of time. The patient is on 3 L nasal cannula at baseline. Patient was admitted in October for bilateral lower lobe pneumonia. Patient says that he has been on prednisone over the last few days for his symptoms without improvement        Prior to Admission Medications   Prescriptions Last Dose Informant Patient Reported? Taking?    ALPRAZolam (XANAX) 0.25 mg tablet   No No   Sig: Take 1 tablet (0.25 mg total) by mouth daily at bedtime as needed for anxiety   Empagliflozin (Jardiance) 10 MG TABS tablet Unknown Self No No   Sig: Take 1 tablet (10 mg total) by mouth every morning   HYDROcodone-acetaminophen (Norco) 5-325 mg per tablet Unknown Spouse/Significant Other, Self No No   Sig: Take 1 tablet by mouth every 6 (six) hours as needed for pain Max Daily Amount: 4 tablets   Humidifier MISC   No No   Sig: Use continuous   aspirin 81 mg chewable tablet Unknown Spouse/Significant Other, Self Yes No   Si mg daily at bedtime   azithromycin (ZITHROMAX) 250 mg tablet Unknown  No No   Sig: Take 2 tablets (500 mg total) by mouth 3 (three) times a week Monday, Wednesday, Friday   bacitracin ophthalmic ointment Unknown  Yes No   budesonide (PULMICORT) 0.5 mg/2 mL nebulizer solution Unknown  No No   Sig: inhale contents of 1 vial ( 2 milliliters ) in nebulizer twice a day Rinse mouth after use   cholecalciferol (VITAMIN D3) 1,000 units tablet Unknown  Yes No   Sig: Take 1,000 Units by mouth daily dextromethorphan-guaiFENesin (ROBITUSSIN DM)  mg/5 mL syrup Unknown Spouse/Significant Other, Self No No   Sig: Take 5 mL by mouth 3 (three) times a day as needed for cough   famotidine (PEPCID) 20 mg tablet Unknown Spouse/Significant Other, Self No No   Sig: TAKE 1 TABLET BY MOUTH DAILY AT  BEDTIME   formoterol (PERFOROMIST) 20 MCG/2ML nebulizer solution Unknown  No No   Sig: Take 2 mL (20 mcg total) by nebulization 2 (two) times a day   furosemide (LASIX) 40 mg tablet Unknown Self No No   Sig: Take 0.5 tablets (20 mg total) by mouth daily   gabapentin (NEURONTIN) 300 mg capsule Unknown  No No   Sig: Take 1 capsule (300 mg total) by mouth daily at bedtime   ipratropium-albuterol (DUO-NEB) 0.5-2.5 mg/3 mL nebulizer solution Unknown Self Yes No   methocarbamol (ROBAXIN) 500 mg tablet Unknown Spouse/Significant Other, Self No No   Sig: Take 1 tablet (500 mg total) by mouth 3 (three) times a day as needed for muscle spasms   metoprolol succinate (TOPROL-XL) 25 mg 24 hr tablet   No No   Sig: Take 0.5 tablets (12.5 mg total) by mouth 2 (two) times a day   naloxone (NARCAN) 4 mg/0.1 mL nasal spray  Spouse/Significant Other, Self No No   Sig: Administer 1 spray into a nostril. If no response after 2-3 minutes, give another dose in the other nostril using a new spray.    nystatin (MYCOSTATIN) 500,000 units/5 mL suspension Unknown  No No   Si ml orally (swish and spit/swallow) every four hours while awake   omeprazole (PriLOSEC) 20 mg delayed release capsule Unknown  No No   Sig: take 1 capsule by mouth once daily   oxygen gas Unknown Spouse/Significant Other, Self Yes No   Sig: Inhale 2 L/min continuous 2LPM at rest and 3-4 LPM with activity per D Cedric FANG notes   predniSONE 20 mg tablet Unknown  No No   Sig: Take 2 tablets (40 mg total) by mouth daily for 5 days   rosuvastatin (CRESTOR) 40 MG tablet Unknown Spouse/Significant Other, Self No No   Sig: TAKE 1 TABLET DAILY   Patient taking differently: Take 40 mg by mouth daily      Facility-Administered Medications: None       Past Medical History:   Diagnosis Date    Abnormal ECG 2021 OR 2022    Alcoholism ([address]) 1984    sober 38 years    Arthritis 2008    beginning    Bilateral carotid artery stenosis     Callus     Cancer (HCC)     skin    Chronic diastolic heart failure (HCC)     Chronic ischemic heart disease     Chronic kidney disease     stage 3    Colon polyp     COPD (chronic obstructive pulmonary disease) (HCC)     Coronary artery disease     hx stents, MI, PCI    COVID 11/2021    CPAP (continuous positive airway pressure) dependence     Disease of thyroid gland 2017    nodules    Emphysema of lung ([address]) 1995    started    Hearing loss     History of transfusion 1995    Hyperlipidemia     Hypertension     Intracranial aneurysm 04/25/2023    Lung nodule 2023    x rays    MI (myocardial infarction) ([address]) 1995    Myocardial infarction ([address]) 1995    Pneumonia     Pneumothorax 02/20/2023    collapsed lung    Prostate cancer ([address])     RSV (respiratory syncytial virus infection) 10/2022    S/P carotid endarterectomy     Shortness of breath     O2 2 l/nc PRN    Sleep apnea     Sleep apnea, obstructive     Stented coronary artery        Past Surgical History:   Procedure Laterality Date    APPENDECTOMY      CARDIAC CATHETERIZATION  03/18/2021    left main with no significant disease, proximal LAD with 10% stenosis at the site of prior stent, left circumflex artery with minimal luminal irregularities mid RCA with 50% stenosis at site of prior stent and PL segment with distal disease supplied by collaterals from the distal circumflex with no significant CAD requiring revascularization at that time.     CATARACT EXTRACTION, BILATERAL Bilateral     COLONOSCOPY      CORONARY ANGIOPLASTY WITH STENT PLACEMENT  1999    RCA    CORONARY ANGIOPLASTY WITH STENT PLACEMENT  2001    RCA    CORONARY ANGIOPLASTY WITH STENT PLACEMENT  2003    LAD    EYE SURGERY      WI BX PROSTATE STRTCTC SATURATION SAMPLING IMG GID N/A 2022    Procedure: TRANSPERINEAL MRI FUSION  BIOPSY PROSTATE;  Surgeon: Leonardo Henson MD;  Location: [facility];  Service: Urology    ME NEUROPLASTY &/TRANSPOS MEDIAN NRV Smiley Israel Left 2022    Procedure: RELEASE CARPAL TUNNEL;  Surgeon: Pauline Whitaker MD;  Location: [facility];  Service: Orthopedics    ME NEUROPLASTY &/TRANSPOSITION ULNAR NERVE ELBOW Left 2022    Procedure: RELEASE CUBITAL TUNNEL;  Surgeon: Pauline Whitaker MD;  Location: [facility];  Service: Orthopedics    ME NEUROPLASTY &/TRANSPOSITION ULNAR NERVE WRIST Left 2022    Procedure: Sofía Credit;  Surgeon: Pauline Whitaker MD;  Location: [facility];  Service: Orthopedics    SKIN CANCER EXCISION  2012    chin-per pt, basal cell  also right ankle    TONSILLECTOMY  no       Family History   Problem Relation Age of Onset    Heart attack Mother     Dementia Mother     Heart failure Mother          2006    Heart disease Mother         heart attacks (2)    No Known Problems Father      I have reviewed and agree with the history as documented. E-Cigarette/Vaping    E-Cigarette Use Never User      E-Cigarette/Vaping Substances    Nicotine No     THC No     CBD No     Flavoring No     Other No     Unknown No      Social History     Tobacco Use    Smoking status: Former     Packs/day: 2.00     Years: 35.00     Total pack years: 70.00     Types: Cigarettes     Start date: 1960     Quit date: 1995     Years since quittin.4    Smokeless tobacco: Never    Tobacco comments:     stopped smoking the day i had a heart attack   Vaping Use    Vaping Use: Never used   Substance Use Topics    Alcohol use: Not Currently    Drug use: Never       Review of Systems   Constitutional:  Negative for chills, fever and unexpected weight change. HENT:  Negative for congestion, sore throat and trouble swallowing. Eyes:  Negative for pain, discharge and itching.    Respiratory:  Positive for cough and shortness of breath. Negative for chest tightness and wheezing. Cardiovascular:  Negative for chest pain, palpitations and leg swelling. Gastrointestinal:  Negative for abdominal pain, blood in stool, diarrhea, nausea and vomiting. Endocrine: Negative for polyuria. Genitourinary:  Negative for difficulty urinating, dysuria, frequency and hematuria. Musculoskeletal:  Negative for arthralgias and back pain. Neurological:  Negative for dizziness, syncope, weakness, light-headedness and headaches. Physical Exam  Physical Exam  Vitals and nursing note reviewed. Constitutional:       General: He is not in acute distress. Appearance: He is well-developed. HENT:      Head: Normocephalic and atraumatic. Right Ear: External ear normal.      Left Ear: External ear normal.   Eyes:      Conjunctiva/sclera: Conjunctivae normal.      Pupils: Pupils are equal, round, and reactive to light. Cardiovascular:      Rate and Rhythm: Normal rate and regular rhythm. Heart sounds: Normal heart sounds. No murmur heard. No friction rub. No gallop. Pulmonary:      Effort: Pulmonary effort is normal. No respiratory distress. Breath sounds: Wheezing present. No rales. Abdominal:      General: Bowel sounds are normal. There is no distension. Palpations: Abdomen is soft. Tenderness: There is no abdominal tenderness. There is no guarding. Musculoskeletal:         General: No tenderness or deformity. Normal range of motion. Cervical back: Normal range of motion. Lymphadenopathy:      Cervical: No cervical adenopathy. Skin:     General: Skin is warm and dry. Neurological:      General: No focal deficit present. Mental Status: He is alert and oriented to person, place, and time. Mental status is at baseline. Cranial Nerves: No cranial nerve deficit. Sensory: No sensory deficit. Motor: No weakness or abnormal muscle tone.    Psychiatric: Behavior: Behavior normal.         Vital Signs  ED Triage Vitals   Temperature Pulse Respirations Blood Pressure SpO2   12/01/23 1431 12/01/23 1427 12/01/23 1427 12/01/23 1427 12/01/23 1427   99.8 °F (37.7 °C) 101 18 96/51 93 %      Temp Source Heart Rate Source Patient Position - Orthostatic VS BP Location FiO2 (%)   12/01/23 1431 12/01/23 1427 12/01/23 1427 12/01/23 1427 --   Tympanic Monitor Lying Right arm       Pain Score       12/01/23 1417       No Pain           Vitals:    12/01/23 1720 12/01/23 1730 12/01/23 1820 12/01/23 2236   BP:   106/63 108/66   Pulse: 104 101 98 (!) 107   Patient Position - Orthostatic VS:    Sitting         Visual Acuity      ED Medications  Medications   ALPRAZolam (XANAX) tablet 0.25 mg (has no administration in time range)   aspirin chewable tablet 81 mg (has no administration in time range)   Empagliflozin (JARDIANCE) tablet 10 mg (has no administration in time range)   famotidine (PEPCID) tablet 20 mg (20 mg Oral Given 12/1/23 2239)   furosemide (LASIX) tablet 20 mg (has no administration in time range)   gabapentin (NEURONTIN) capsule 300 mg (300 mg Oral Given 12/1/23 2239)   methocarbamol (ROBAXIN) tablet 500 mg (has no administration in time range)   metoprolol succinate (TOPROL-XL) 24 hr tablet 12.5 mg (12.5 mg Oral Not Given 12/1/23 2238)   pantoprazole (PROTONIX) EC tablet 40 mg (40 mg Oral Given 12/2/23 0535)   atorvastatin (LIPITOR) tablet 80 mg (80 mg Oral Given 12/1/23 1836)   acetaminophen (TYLENOL) tablet 650 mg (has no administration in time range)   guaiFENesin (MUCINEX) 12 hr tablet 600 mg (600 mg Oral Given 12/1/23 1836)   methylPREDNISolone sodium succinate (Solu-MEDROL) injection 40 mg (40 mg Intravenous Given 12/2/23 0307)   azithromycin (ZITHROMAX) tablet 500 mg (has no administration in time range)   heparin (porcine) subcutaneous injection 5,000 Units (5,000 Units Subcutaneous Given 12/2/23 6303)   levalbuterol (XOPENEX) inhalation solution 1.25 mg (1.25 mg Nebulization Given 12/1/23 2015)   ipratropium (ATROVENT) 0.02 % inhalation solution 0.5 mg (0.5 mg Nebulization Given 12/1/23 2015)   cefTRIAXone (ROCEPHIN) IVPB (premix in dextrose) 1,000 mg 50 mL (1,000 mg Intravenous New Bag 12/1/23 2020)   guaiFENesin (ROBITUSSIN) oral liquid 200 mg (200 mg Oral Given 12/1/23 2324)   albuterol inhalation solution 10 mg (10 mg Nebulization Given 12/1/23 1446)   ipratropium (ATROVENT) 0.02 % inhalation solution 1 mg (1 mg Nebulization Given 12/1/23 1446)   sodium chloride 0.9 % inhalation solution 12 mL (12 mL Nebulization Given 12/1/23 1446)   sodium chloride 0.9 % bolus 1,000 mL (0 mL Intravenous Stopped 12/1/23 1835)   azithromycin (ZITHROMAX) tablet 500 mg (500 mg Oral Given 12/1/23 1728)       Diagnostic Studies  Results Reviewed       Procedure Component Value Units Date/Time    CBC and differential [[id number]]  (Abnormal) Collected: 12/02/23 0450    Lab Status: Final result Specimen: Blood from Arm, Right Updated: 12/02/23 0629     WBC 7.64 Thousand/uL      RBC 3.99 Million/uL      Hemoglobin 12.2 g/dL      Hematocrit 40.2 %       fL      MCH 30.6 pg      MCHC 30.3 g/dL      RDW 16.5 %      MPV 11.2 fL      Platelets 774 Thousands/uL     Narrative: This is an appended report. These results have been appended to a previously verified report.     Procalcitonin, Next Day AM Collection [[id number]]  (Normal) Collected: 12/02/23 0450    Lab Status: Final result Specimen: Blood from Arm, Right Updated: 12/02/23 0610     Procalcitonin 0.08 ng/ml     Basic metabolic panel [[id number]]  (Abnormal) Collected: 12/02/23 0450    Lab Status: Final result Specimen: Blood from Arm, Right Updated: 12/02/23 0602     Sodium 140 mmol/L      Potassium 4.0 mmol/L      Chloride 100 mmol/L      CO2 33 mmol/L      ANION GAP 7 mmol/L      BUN 17 mg/dL      Creatinine 1.04 mg/dL      Glucose 147 mg/dL      Calcium 8.3 mg/dL      eGFR 67 ml/min/1.73sq m     Narrative:      National Kidney Disease Foundation guidelines for Chronic Kidney Disease (CKD):     Stage 1 with normal or high GFR (GFR > 90 mL/min/1.73 square meters)    Stage 2 Mild CKD (GFR = 60-89 mL/min/1.73 square meters)    Stage 3A Moderate CKD (GFR = 45-59 mL/min/1.73 square meters)    Stage 3B Moderate CKD (GFR = 30-44 mL/min/1.73 square meters)    Stage 4 Severe CKD (GFR = 15-29 mL/min/1.73 square meters)    Stage 5 End Stage CKD (GFR <15 mL/min/1.73 square meters)  Note: GFR calculation is accurate only with a steady state creatinine    Blood culture #1 [[id number]] Collected: 12/01/23 1515    Lab Status: Preliminary result Specimen: Blood from Arm, Right Updated: 12/02/23 0102     Blood Culture Received in Microbiology Lab. Culture in Progress. Blood culture #2 [[id number]] Collected: 12/01/23 1452    Lab Status: Preliminary result Specimen: Blood from Arm, Right Updated: 12/02/23 0102     Blood Culture Received in Microbiology Lab. Culture in Progress. FLU/RSV/COVID - if FLU/RSV clinically relevant [[id number]]  (Abnormal) Collected: 12/01/23 1801    Lab Status: Final result Specimen: Nares from Nose Updated: 12/01/23 1846     SARS-CoV-2 Positive     INFLUENZA A PCR Negative     INFLUENZA B PCR Negative     RSV PCR Negative    Narrative:      FOR PEDIATRIC PATIENTS - copy/paste COVID Guidelines URL to browser: [email or web address]    SARS-CoV-2 assay is a Nucleic Acid Amplification assay intended for the  qualitative detection of nucleic acid from SARS-CoV-2 in nasopharyngeal  swabs. Results are for the presumptive identification of SARS-CoV-2 RNA. Positive results are indicative of infection with SARS-CoV-2, the virus  causing COVID-19, but do not rule out bacterial infection or co-infection  with other viruses. Laboratories within the [facility] and its  territories are required to report all positive results to the appropriate  public health authorities.  Negative results do not preclude SARS-CoV-2  infection and should not be used as the sole basis for treatment or other  patient management decisions. Negative results must be combined with  clinical observations, patient history, and epidemiological information. This test has not been FDA cleared or approved. This test has been authorized by FDA under an Emergency Use Authorization  (EUA). This test is only authorized for the duration of time the  declaration that circumstances exist justifying the authorization of the  emergency use of an in vitro diagnostic tests for detection of SARS-CoV-2  virus and/or diagnosis of COVID-19 infection under section 564(b)(1) of  the Act, 21 U. S.C. 277TZR-9(Z)(3), unless the authorization is terminated  or revoked sooner. The test has been validated but independent review by FDA  and CLIA is pending. Test performed using [employer]: This RT-PCR assay targets N2,  a region unique to SARS-CoV-2. A conserved region in the E-gene was chosen  for pan-Sarbecovirus detection which includes SARS-CoV-2. According to CMS-2020-01-R, this platform meets the definition of high-throughput technology. Lactic acid 2 Hours [[id number]]  (Normal) Collected: 12/01/23 1801    Lab Status: Final result Specimen: Blood from Arm, Right Updated: 12/01/23 1826     LACTIC ACID 1.4 mmol/L     Narrative:      Result may be elevated if tourniquet was used during collection. Procalcitonin [[id number]]     Lab Status: No result Specimen: Blood     Lactic acid, plasma (w/reflex if result > 2.0) [[id number]]  (Abnormal) Collected: 12/01/23 1452    Lab Status: Final result Specimen: Blood from Arm, Right Updated: 12/01/23 1522     LACTIC ACID 2.8 mmol/L     Narrative:      Result may be elevated if tourniquet was used during collection.     Comprehensive metabolic panel [[id number]]  (Abnormal) Collected: 12/01/23 1452    Lab Status: Final result Specimen: Blood from Arm, Right Updated: 12/01/23 1518 Sodium 142 mmol/L      Potassium 3.2 mmol/L      Chloride 100 mmol/L      CO2 36 mmol/L      ANION GAP 6 mmol/L      BUN 19 mg/dL      Creatinine 1.07 mg/dL      Glucose 161 mg/dL      Calcium 8.7 mg/dL      AST 25 U/L      ALT 23 U/L      Alkaline Phosphatase 53 U/L      Total Protein 5.6 g/dL      Albumin 3.6 g/dL      Total Bilirubin 0.66 mg/dL      eGFR 65 ml/min/1.73sq m     Narrative:      National Kidney Disease Foundation guidelines for Chronic Kidney Disease (CKD):     Stage 1 with normal or high GFR (GFR > 90 mL/min/1.73 square meters)    Stage 2 Mild CKD (GFR = 60-89 mL/min/1.73 square meters)    Stage 3A Moderate CKD (GFR = 45-59 mL/min/1.73 square meters)    Stage 3B Moderate CKD (GFR = 30-44 mL/min/1.73 square meters)    Stage 4 Severe CKD (GFR = 15-29 mL/min/1.73 square meters)    Stage 5 End Stage CKD (GFR <15 mL/min/1.73 square meters)  Note: GFR calculation is accurate only with a steady state creatinine    Protime-INR [[id number]]  (Normal) Collected: 12/01/23 1452    Lab Status: Final result Specimen: Blood from Arm, Right Updated: 12/01/23 1516     Protime 12.9 seconds      INR 0.97    APTT [[id number]]  (Normal) Collected: 12/01/23 1452    Lab Status: Final result Specimen: Blood from Arm, Right Updated: 12/01/23 1516     PTT 25 seconds     CBC and differential [[id number]]  (Abnormal) Collected: 12/01/23 1452    Lab Status: Final result Specimen: Blood from Arm, Right Updated: 12/01/23 1503     WBC 12.37 Thousand/uL      RBC 4.12 Million/uL      Hemoglobin 13.0 g/dL      Hematocrit 42.1 %       fL      MCH 31.6 pg      MCHC 30.9 g/dL      RDW 16.5 %      MPV 10.8 fL      Platelets 048 Thousands/uL      nRBC 0 /100 WBCs      Neutrophils Relative 85 %      Immat GRANS % 0 %      Lymphocytes Relative 5 %      Monocytes Relative 10 %      Eosinophils Relative 0 %      Basophils Relative 0 %      Neutrophils Absolute 10.53 Thousands/µL      Immature Grans Absolute 0.05 Thousand/uL Lymphocytes Absolute 0.57 Thousands/µL      Monocytes Absolute 1.20 Thousand/µL      Eosinophils Absolute 0.00 Thousand/µL      Basophils Absolute 0.02 Thousands/µL                    CT chest without contrast   Final Result by Roderick Pino MD (12/01 1655)      1. No acute abnormality. 2.  Improved bilateral pleural effusions with stable bibasilar patchy opacity, right greater than left, likely chronic atelectasis with calcification or aspirated contrast/barium. Workstation performed: [id number]         XR chest 2 views   Final Result by Naseem Luther MD (12/01 1558)      Near complete resolution of previously noted bibasilar opacities. However, there continues to be superimposed mild bibasilar interstitial and small patchy opacities. This likely represents a combination of mild interstitial pulmonary edema and bibasilar    infection/inflammation, such as from recurrent aspiration pneumonia. Stable emphysematous changes. Small bilateral pleural effusions. Resident: Kaya Hernandez, the attending radiologist, have reviewed the images and agree with the final report above. Workstation performed: [id number]                    Procedures  Procedures         ED Course  ED Course as of 12/02/23 0708   Fri Dec 01, 2023   1711 Ct shows no signs of pneumonia, patient with some improvement after treatment, still does not feel like he is at his baseline in terms of breathing. Will obtain ambulatory pulse ox. 1714 Ambulatory pulse ox dropped to 81% with ambulation, increased SOB of baseline                                 SBIRT 20yo+      Flowsheet Row Most Recent Value   Initial Alcohol Screen: US AUDIT-C     1. How often do you have a drink containing alcohol? 0 Filed at: 12/01/2023 1431   2. How many drinks containing alcohol do you have on a typical day you are drinking? 0 Filed at: 12/01/2023 1431   3a. Male UNDER 65:  How often do you have five or more drinks on one occasion? 0 Filed at: 12/01/2023 1431   3b. FEMALE Any Age, or MALE 65+: How often do you have 4 or more drinks on one occassion? 0 Filed at: 12/01/2023 1431   Audit-C Score 0 Filed at: 12/01/2023 1431   CRISTOPHER: How many times in the past year have you. .. Used an illegal drug or used a prescription medication for non-medical reasons? Never Filed at: 12/01/2023 1431                      Medical Decision Making  69-year-old male presenting for evaluation of shortness of breath. Progressing since Wednesday. History of COPD. Feels similar to previous exacerbations. Admits to a cough, denies fevers, chills, nausea, vomiting, chest pain. Patient with diffuse wheezing on exam.  Is on 3 L baseline nasal cannula. Symptoms are likely secondary to COPD exacerbation. Will give heart neb, patient was recently on steroids without improvement. Will obtain chest x-ray to rule pneumonia. Will obtain COVID/flu/RSV. X-ray shows no acute cardiopulmonary disease, patient with some improvement after breathing treatment however still is not at baseline. Leukocytosis of 12 which is likely secondary to recent steroid use. Will obtain CT chest without contrast to rule out underlying pneumonia  CT chest shows no acute cardiopulmonary disease. COVID test was positive. Given patient's persistent shortness of breath, D-Stat on amatory pulse ox will admit to the hospital    Problems Addressed:  COPD exacerbation ([address]): acute illness or injury    Amount and/or Complexity of Data Reviewed  Labs: ordered. Radiology: ordered. Risk  Prescription drug management. Decision regarding hospitalization.              Disposition  Final diagnoses:   COPD exacerbation ([address])     Time reflects when diagnosis was documented in both MDM as applicable and the Disposition within this note       Time User Action Codes Description Comment    12/1/2023  5:22 PM Tyler Colbert Add [J44.1] COPD exacerbation [facility])           ED Disposition       ED Disposition   Admit    Condition   Stable    Date/Time   Fri Dec 1, 2023 4002    Comment   Case was discussed with AMANDA and the patient's admission status was agreed to be Admission Status: inpatient status to the service of Dr. Toni Nye .                Follow-up Information    None         Current Discharge Medication List        CONTINUE these medications which have NOT CHANGED    Details   ALPRAZolam (XANAX) 0.25 mg tablet Take 1 tablet (0.25 mg total) by mouth daily at bedtime as needed for anxiety  Qty: 15 tablet, Refills: 0    Associated Diagnoses: Anxiety      aspirin 81 mg chewable tablet 81 mg daily at bedtime      azithromycin (ZITHROMAX) 250 mg tablet Take 2 tablets (500 mg total) by mouth 3 (three) times a week Monday, Wednesday, Friday  Qty: 72 tablet, Refills: 0    Associated Diagnoses: Chronic obstructive pulmonary disease, unspecified COPD type ([facility])      bacitracin ophthalmic ointment       budesonide (PULMICORT) 0.5 mg/2 mL nebulizer solution inhale contents of 1 vial ( 2 milliliters ) in nebulizer twice a day Rinse mouth after use  Qty: 180 mL, Refills: 0    Associated Diagnoses: Chronic obstructive pulmonary disease, unspecified COPD type ([facility])      cholecalciferol (VITAMIN D3) 1,000 units tablet Take 1,000 Units by mouth daily      dextromethorphan-guaiFENesin (ROBITUSSIN DM)  mg/5 mL syrup Take 5 mL by mouth 3 (three) times a day as needed for cough  Qty: 354 mL, Refills: 0    Associated Diagnoses: COPD with exacerbation ([facility])      Empagliflozin (Jardiance) 10 MG TABS tablet Take 1 tablet (10 mg total) by mouth every morning  Qty: 90 tablet, Refills: 5    Associated Diagnoses: HFrEF (heart failure with reduced ejection fraction) ([facility])      famotidine (PEPCID) 20 mg tablet TAKE 1 TABLET BY MOUTH DAILY AT  BEDTIME  Qty: 100 tablet, Refills: 2    Comments: Requesting 1 year supply  Associated Diagnoses: Gastroesophageal reflux disease, unspecified whether esophagitis present      formoterol (PERFOROMIST) 20 MCG/2ML nebulizer solution Take 2 mL (20 mcg total) by nebulization 2 (two) times a day  Qty: 180 mL, Refills: 0    Associated Diagnoses: Chronic obstructive pulmonary disease, unspecified COPD type ([facility])      furosemide (LASIX) 40 mg tablet Take 0.5 tablets (20 mg total) by mouth daily  Qty: 90 tablet, Refills: 2    Associated Diagnoses: Chronic ischemic heart disease      gabapentin (NEURONTIN) 300 mg capsule Take 1 capsule (300 mg total) by mouth daily at bedtime  Qty: 90 capsule, Refills: 0    Associated Diagnoses: Chronic pain syndrome; Intercostal neuralgia; Chronic right-sided thoracic back pain      Humidifier MISC Use continuous  Qty: 1 each, Refills: 0    Associated Diagnoses: Chronic respiratory failure with hypoxia ([facility])      HYDROcodone-acetaminophen (Norco) 5-325 mg per tablet Take 1 tablet by mouth every 6 (six) hours as needed for pain Max Daily Amount: 4 tablets  Qty: 15 tablet, Refills: 0    Associated Diagnoses: Costochondral chest pain      ipratropium-albuterol (DUO-NEB) 0.5-2.5 mg/3 mL nebulizer solution       methocarbamol (ROBAXIN) 500 mg tablet Take 1 tablet (500 mg total) by mouth 3 (three) times a day as needed for muscle spasms  Qty: 30 tablet, Refills: 0    Associated Diagnoses: Chronic right-sided thoracic back pain      metoprolol succinate (TOPROL-XL) 25 mg 24 hr tablet Take 0.5 tablets (12.5 mg total) by mouth 2 (two) times a day  Qty: 30 tablet, Refills: 0    Associated Diagnoses: Acute on chronic right-sided congestive heart failure ([facility])      naloxone (NARCAN) 4 mg/0.1 mL nasal spray Administer 1 spray into a nostril. If no response after 2-3 minutes, give another dose in the other nostril using a new spray.   Qty: 1 each, Refills: 1    Associated Diagnoses: Costochondral chest pain      nystatin (MYCOSTATIN) 500,000 units/5 mL suspension 5 ml orally (swish and spit/swallow) every four hours while awake  Qty: 180 mL, Refills: 0    Associated Diagnoses: Oral candidiasis      omeprazole (PriLOSEC) 20 mg delayed release capsule take 1 capsule by mouth once daily  Qty: 30 capsule, Refills: 3    Associated Diagnoses: Gastroesophageal reflux disease, unspecified whether esophagitis present      oxygen gas Inhale 2 L/min continuous 2LPM at rest and 3-4 LPM with activity per D Cedric FANG notes      predniSONE 20 mg tablet Take 2 tablets (40 mg total) by mouth daily for 5 days  Qty: 10 tablet, Refills: 0    Associated Diagnoses: COPD with acute exacerbation (HCC)      rosuvastatin (CRESTOR) 40 MG tablet TAKE 1 TABLET DAILY  Qty: 100 tablet, Refills: 3    Associated Diagnoses: Hyperlipidemia, unspecified hyperlipidemia type             No discharge procedures on file.     PDMP Review         Value Time User    PDMP Reviewed  Yes 11/22/2023  8:01 AM Shonda Ward DO            ED Provider  Electronically Signed by             Valerie Viera DO  12/02/23 0822

## 2023-12-01 NOTE — ASSESSMENT & PLAN NOTE
Present on admission as evidenced by patient requiring 5 L of nasal cannula supplemental O2  Patient requires 2-3 L of nasal cannula supplemental O2  Likely secondary to COPD exacerbation  Wean O2 as tolerated  Goal SpO2 greater than 88%  Respiratory protocol

## 2023-12-01 NOTE — ASSESSMENT & PLAN NOTE
Not in acute exacerbation  Continue home beta-blocker and Entresto  Outpatient follow-up with cardiology

## 2023-12-02 LAB
ANION GAP SERPL CALCULATED.3IONS-SCNC: 7 MMOL/L
ANISOCYTOSIS BLD QL SMEAR: PRESENT
ATRIAL RATE: 99 BPM
BASOPHILS # BLD MANUAL: 0 THOUSAND/UL (ref 0–0.1)
BASOPHILS NFR MAR MANUAL: 0 % (ref 0–1)
BUN SERPL-MCNC: 17 MG/DL (ref 5–25)
CALCIUM SERPL-MCNC: 8.3 MG/DL (ref 8.4–10.2)
CHLORIDE SERPL-SCNC: 100 MMOL/L (ref 96–108)
CO2 SERPL-SCNC: 33 MMOL/L (ref 21–32)
CREAT SERPL-MCNC: 1.04 MG/DL (ref 0.6–1.3)
EOSINOPHIL # BLD MANUAL: 0 THOUSAND/UL (ref 0–0.4)
EOSINOPHIL NFR BLD MANUAL: 0 % (ref 0–6)
ERYTHROCYTE [DISTWIDTH] IN BLOOD BY AUTOMATED COUNT: 16.5 % (ref 11.6–15.1)
GFR SERPL CREATININE-BSD FRML MDRD: 67 ML/MIN/1.73SQ M
GLUCOSE SERPL-MCNC: 147 MG/DL (ref 65–140)
HCT VFR BLD AUTO: 40.2 % (ref 36.5–49.3)
HGB BLD-MCNC: 12.2 G/DL (ref 12–17)
LYMPHOCYTES # BLD AUTO: 0.46 THOUSAND/UL (ref 0.6–4.47)
LYMPHOCYTES # BLD AUTO: 6 % (ref 14–44)
MCH RBC QN AUTO: 30.6 PG (ref 26.8–34.3)
MCHC RBC AUTO-ENTMCNC: 30.3 G/DL (ref 31.4–37.4)
MCV RBC AUTO: 101 FL (ref 82–98)
MONOCYTES # BLD AUTO: 0.15 THOUSAND/UL (ref 0–1.22)
MONOCYTES NFR BLD: 2 % (ref 4–12)
NEUTROPHILS # BLD MANUAL: 7.03 THOUSAND/UL (ref 1.85–7.62)
NEUTS SEG NFR BLD AUTO: 92 % (ref 43–75)
OVALOCYTES BLD QL SMEAR: PRESENT
P AXIS: 66 DEGREES
PLATELET # BLD AUTO: 214 THOUSANDS/UL (ref 149–390)
PLATELET BLD QL SMEAR: ADEQUATE
PMV BLD AUTO: 11.2 FL (ref 8.9–12.7)
POIKILOCYTOSIS BLD QL SMEAR: PRESENT
POTASSIUM SERPL-SCNC: 4 MMOL/L (ref 3.5–5.3)
PR INTERVAL: 152 MS
PROCALCITONIN SERPL-MCNC: 0.08 NG/ML
QRS AXIS: 75 DEGREES
QRSD INTERVAL: 100 MS
QT INTERVAL: 370 MS
QTC INTERVAL: 474 MS
RBC # BLD AUTO: 3.99 MILLION/UL (ref 3.88–5.62)
RBC MORPH BLD: PRESENT
SODIUM SERPL-SCNC: 140 MMOL/L (ref 135–147)
T WAVE AXIS: -57 DEGREES
VENTRICULAR RATE: 99 BPM
WBC # BLD AUTO: 7.64 THOUSAND/UL (ref 4.31–10.16)

## 2023-12-02 PROCEDURE — 85007 BL SMEAR W/DIFF WBC COUNT: CPT | Performed by: INTERNAL MEDICINE

## 2023-12-02 PROCEDURE — 99232 SBSQ HOSP IP/OBS MODERATE 35: CPT | Performed by: INTERNAL MEDICINE

## 2023-12-02 PROCEDURE — 93005 ELECTROCARDIOGRAM TRACING: CPT

## 2023-12-02 PROCEDURE — 84145 PROCALCITONIN (PCT): CPT | Performed by: INTERNAL MEDICINE

## 2023-12-02 PROCEDURE — 94640 AIRWAY INHALATION TREATMENT: CPT

## 2023-12-02 PROCEDURE — 93010 ELECTROCARDIOGRAM REPORT: CPT | Performed by: INTERNAL MEDICINE

## 2023-12-02 PROCEDURE — 94760 N-INVAS EAR/PLS OXIMETRY 1: CPT

## 2023-12-02 PROCEDURE — 85027 COMPLETE CBC AUTOMATED: CPT | Performed by: INTERNAL MEDICINE

## 2023-12-02 PROCEDURE — 94664 DEMO&/EVAL PT USE INHALER: CPT

## 2023-12-02 PROCEDURE — 80048 BASIC METABOLIC PNL TOTAL CA: CPT | Performed by: INTERNAL MEDICINE

## 2023-12-02 RX ORDER — DEXAMETHASONE SODIUM PHOSPHATE 10 MG/ML
6 INJECTION, SOLUTION INTRAMUSCULAR; INTRAVENOUS EVERY 24 HOURS
Status: DISCONTINUED | OUTPATIENT
Start: 2023-12-02 | End: 2023-12-09 | Stop reason: HOSPADM

## 2023-12-02 RX ORDER — FLUTICASONE PROPIONATE 50 MCG
1 SPRAY, SUSPENSION (ML) NASAL DAILY
Status: DISCONTINUED | OUTPATIENT
Start: 2023-12-02 | End: 2023-12-06

## 2023-12-02 RX ORDER — HEPARIN SODIUM 5000 [USP'U]/ML
5000 INJECTION, SOLUTION INTRAVENOUS; SUBCUTANEOUS EVERY 8 HOURS SCHEDULED
Status: DISCONTINUED | OUTPATIENT
Start: 2023-12-02 | End: 2023-12-09 | Stop reason: HOSPADM

## 2023-12-02 RX ADMIN — GABAPENTIN 300 MG: 300 CAPSULE ORAL at 22:35

## 2023-12-02 RX ADMIN — METHYLPREDNISOLONE SODIUM SUCCINATE 40 MG: 40 INJECTION, POWDER, FOR SOLUTION INTRAMUSCULAR; INTRAVENOUS at 03:07

## 2023-12-02 RX ADMIN — GUAIFENESIN 200 MG: 200 SOLUTION ORAL at 22:44

## 2023-12-02 RX ADMIN — HEPARIN SODIUM 5000 UNITS: 5000 INJECTION INTRAVENOUS; SUBCUTANEOUS at 05:35

## 2023-12-02 RX ADMIN — CEFTRIAXONE 1000 MG: 1 INJECTION, SOLUTION INTRAVENOUS at 19:33

## 2023-12-02 RX ADMIN — IPRATROPIUM BROMIDE 0.5 MG: 0.5 SOLUTION RESPIRATORY (INHALATION) at 07:17

## 2023-12-02 RX ADMIN — ASPIRIN 81 MG CHEWABLE TABLET 81 MG: 81 TABLET CHEWABLE at 09:44

## 2023-12-02 RX ADMIN — FAMOTIDINE 20 MG: 20 TABLET ORAL at 22:35

## 2023-12-02 RX ADMIN — GUAIFENESIN 600 MG: 600 TABLET ORAL at 09:45

## 2023-12-02 RX ADMIN — IPRATROPIUM BROMIDE 0.5 MG: 0.5 SOLUTION RESPIRATORY (INHALATION) at 17:57

## 2023-12-02 RX ADMIN — DEXAMETHASONE SODIUM PHOSPHATE 6 MG: 10 INJECTION, SOLUTION INTRAMUSCULAR; INTRAVENOUS at 12:13

## 2023-12-02 RX ADMIN — REMDESIVIR 200 MG: 100 INJECTION, POWDER, LYOPHILIZED, FOR SOLUTION INTRAVENOUS at 12:13

## 2023-12-02 RX ADMIN — METOPROLOL SUCCINATE 12.5 MG: 25 TABLET, EXTENDED RELEASE ORAL at 22:39

## 2023-12-02 RX ADMIN — LEVALBUTEROL HYDROCHLORIDE 1.25 MG: 1.25 SOLUTION RESPIRATORY (INHALATION) at 17:57

## 2023-12-02 RX ADMIN — METHYLPREDNISOLONE SODIUM SUCCINATE 40 MG: 40 INJECTION, POWDER, FOR SOLUTION INTRAMUSCULAR; INTRAVENOUS at 09:45

## 2023-12-02 RX ADMIN — GUAIFENESIN 600 MG: 600 TABLET ORAL at 17:13

## 2023-12-02 RX ADMIN — EMPAGLIFLOZIN 10 MG: 10 TABLET, FILM COATED ORAL at 09:45

## 2023-12-02 RX ADMIN — HEPARIN SODIUM 5000 UNITS: 5000 INJECTION INTRAVENOUS; SUBCUTANEOUS at 14:53

## 2023-12-02 RX ADMIN — LEVALBUTEROL HYDROCHLORIDE 1.25 MG: 1.25 SOLUTION RESPIRATORY (INHALATION) at 07:17

## 2023-12-02 RX ADMIN — ATORVASTATIN CALCIUM 80 MG: 80 TABLET, FILM COATED ORAL at 17:13

## 2023-12-02 RX ADMIN — PANTOPRAZOLE SODIUM 40 MG: 40 TABLET, DELAYED RELEASE ORAL at 05:35

## 2023-12-02 RX ADMIN — FLUTICASONE PROPIONATE 1 SPRAY: 50 SPRAY, METERED NASAL at 19:53

## 2023-12-02 RX ADMIN — IPRATROPIUM BROMIDE 0.5 MG: 0.5 SOLUTION RESPIRATORY (INHALATION) at 13:14

## 2023-12-02 RX ADMIN — LEVALBUTEROL HYDROCHLORIDE 1.25 MG: 1.25 SOLUTION RESPIRATORY (INHALATION) at 13:14

## 2023-12-02 RX ADMIN — HEPARIN SODIUM 5000 UNITS: 5000 INJECTION INTRAVENOUS; SUBCUTANEOUS at 22:35

## 2023-12-02 RX ADMIN — AZITHROMYCIN DIHYDRATE 500 MG: 250 TABLET ORAL at 17:13

## 2023-12-02 RX ADMIN — BARICITINIB 4 MG: 2 TABLET, FILM COATED ORAL at 12:13

## 2023-12-02 NOTE — UTILIZATION REVIEW
OBSERVATION 12/1/23 @ 1723 CONVERTED TO INPATIENT 12/2/23 @ 1517 DUE TO COPD EXACERBATION AND COVID REQUIRING IV STEROIDS, REMDESIVIR, BARICITINIB  Initial Clinical Review    Admission: Date/Time/Statement:   Admission Orders (From admission, onward)       Ordered        12/02/23 1517  Inpatient Admission  Once            12/01/23 1723  Place in Observation  Once                          Orders Placed This Encounter   Procedures    Place in Observation     Standing Status:   Standing     Number of Occurrences:   1     Order Specific Question:   Level of Care     Answer:   Med Surg [16]    Inpatient Admission     Standing Status:   Standing     Number of Occurrences:   1     Order Specific Question:   Level of Care     Answer:   Med Surg [16]     Order Specific Question:   Estimated length of stay     Answer:   More than 2 Midnights     Order Specific Question:   Certification     Answer:   I certify that inpatient services are medically necessary for this patient for a duration of greater than two midnights. See H&P and MD Progress Notes for additional information about the patient's course of treatment. ED Arrival Information       Expected   -    Arrival   12/1/2023 14:01    Acuity   Urgent              Means of arrival   Walk-In    Escorted by   Spouse    Service   Hospitalist    Admission type   Emergency              Arrival complaint   sob             Chief Complaint   Patient presents with    Shortness of Breath     Copd exacerbation  On prednisone with no improvement         Initial Presentation: 77 y.o. male to the ED from home with complaints of shortness of breath. Admitted under observation then converted to inpatient for COPD with acute exacerbation, acute on chronic respiratory failure with hypoxia. H/O COPD on 2-3 liters nC.  congestive heart failure, hyperlipidemia, GERD . Arrives tachycardic, pulse ox 93 % on baseline 2 LIters NC. Arrives wheezng.  In the ED, given nebulizers, started on IV steroids, iv abx. COVID +. REquiring 5LNC currently. Potassium 3.2, wbcs 12.37. Lactic acid 2.8. Date:12/2  Continue with iv steroids, iv remdesivir, baricitinib. Using 5 liters nc. LUngs clear. Date: 12/3    Day 3: Has surpassed a 2nd midnight with active treatments and services, which include continue iv steroids, remdesivir, oxygen.       ED Triage Vitals   Temperature Pulse Respirations Blood Pressure SpO2   12/01/23 1431 12/01/23 1427 12/01/23 1427 12/01/23 1427 12/01/23 1427   99.8 °F (37.7 °C) 101 18 96/51 93 %      Temp Source Heart Rate Source Patient Position - Orthostatic VS BP Location FiO2 (%)   12/01/23 1431 12/01/23 1427 12/01/23 1427 12/01/23 1427 --   Tympanic Monitor Lying Right arm       Pain Score       12/01/23 1417       No Pain          Wt Readings from Last 1 Encounters:   12/02/23 77.6 kg (171 lb 1.2 oz)     Additional Vital Signs: Vital Signs (last 2 days)    Date/Time Temp Pulse Resp BP MAP (mmHg) SpO2 Calculated FIO2 (%) - Nasal Cannula Nasal Cannula O2 Flow Rate (L/min) O2 Device O2 Interface Device Patient Position - Orthostatic VS   12/02/23 07:26:41 -- 90 16 94/61 72 100 % -- -- -- -- --   12/02/23 0717 -- -- -- -- -- 88 % Abnormal  -- -- -- -- --   12/01/23 2248 -- -- -- -- -- -- -- -- -- Nasal mask --   12/01/23 22:36:34 97.8 °F (36.6 °C) 107 Abnormal   20 108/66 80 86 % Abnormal  32 3 L/min Nasal cannula -- Sitting   Pulse: hr after coughing episode at 12/01/23 2236 12/01/23 2015 -- -- -- -- -- -- 32 3 L/min Nasal cannula -- --   12/01/23 18:20:29 97.5 °F (36.4 °C) 98 16 106/63 77 94 % -- -- -- -- --   12/01/23 1730 -- 101 27 Abnormal  -- -- 97 % 36 4 L/min -- -- --   12/01/23 1720 -- 104 26 Abnormal  -- -- 80 % Abnormal  32 3 L/min --  -- --   O2 Device: ambulatory pulse ox at 12/01/23 1720   12/01/23 1711 -- 99 20 -- -- 92 % 32 3 L/min Nasal cannula -- --   12/01/23 1600 -- 110 Abnormal  20 120/66 86 97 % -- -- Nasal cannula  -- --   O2 Device: Pateint currently on heart neb at 12/01/23 1600   12/01/23 1446 -- -- -- -- -- 98 % -- -- -- -- --   12/01/23 1431 99.8 °F (37.7 °C) -- -- -- -- -- -- -- -- -- --   12/01/23 1428 -- -- -- -- -- 93 % -- -- -- -- --   12/01/23 1427 -- 101 18 96/51 65 93 % 40 5 L/min Nasal cannula -- Lying     Weights (last 14 days)    Pertinent Labs/Diagnostic Test Results:   12/1 EKG: Narrative & Impression    Sinus rhythm with sinus arrhythmia with frequent Premature ventricular complexes  Nonspecific ST and T wave abnormality  Prolonged QT  Abnormal ECG  When compared with ECG of 18-OCT-2023 11:46,  No significant change was found     CT chest without contrast   Final Result by Earnestine More MD (12/01 3735)      1. No acute abnormality. 2.  Improved bilateral pleural effusions with stable bibasilar patchy opacity, right greater than left, likely chronic atelectasis with calcification or aspirated contrast/barium. Workstation performed: [id number]         XR chest 2 views   Final Result by Idania Canchola MD (12/01 1398)      Near complete resolution of previously noted bibasilar opacities. However, there continues to be superimposed mild bibasilar interstitial and small patchy opacities. This likely represents a combination of mild interstitial pulmonary edema and bibasilar    infection/inflammation, such as from recurrent aspiration pneumonia. Stable emphysematous changes. Small bilateral pleural effusions. Resident: Edel Wilson, the attending radiologist, have reviewed the images and agree with the final report above.       Workstation performed: [id number]           Results from last 7 days   Lab Units 12/01/23  1801   SARS-COV-2  Positive*     Results from last 7 days   Lab Units 12/02/23  0450 12/01/23  1452   WBC Thousand/uL 7.64 12.37*   HEMOGLOBIN g/dL 12.2 13.0   HEMATOCRIT % 40.2 42.1   PLATELETS Thousands/uL 214 246   NEUTROS ABS Thousands/µL  --  10.53*         Results from last 7 days   Lab Units 12/02/23  0450 12/01/23  1452   SODIUM mmol/L 140 142   POTASSIUM mmol/L 4.0 3.2*   CHLORIDE mmol/L 100 100   CO2 mmol/L 33* 36*   ANION GAP mmol/L 7 6   BUN mg/dL 17 19   CREATININE mg/dL 1.04 1.07   EGFR ml/min/1.73sq m 67 65   CALCIUM mg/dL 8.3* 8.7   MAGNESIUM mg/dL  --   --    PHOSPHORUS mg/dL  --   --      Results from last 7 days   Lab Units 12/01/23  1452   AST U/L 25   ALT U/L 23   ALK PHOS U/L 53   TOTAL PROTEIN g/dL 5.6*   ALBUMIN g/dL 3.6   TOTAL BILIRUBIN mg/dL 0.66         Results from last 7 days   Lab Units 12/03/23  0515 12/02/23  0450 12/01/23  1452   GLUCOSE RANDOM mg/dL 137 147* 161*     Results from last 7 days   Lab Units 12/01/23  1452   PROTIME seconds 12.9   INR  0.97   PTT seconds 25         Results from last 7 days   Lab Units 12/02/23  0450   PROCALCITONIN ng/ml 0.08     Results from last 7 days   Lab Units 12/01/23  1801 12/01/23  1452   LACTIC ACID mmol/L 1.4 2.8*     Results from last 7 days   Lab Units 12/01/23  1801   INFLUENZA A PCR  Negative   INFLUENZA B PCR  Negative   RSV PCR  Negative           Results from last 7 days   Lab Units 12/01/23  1515 12/01/23  1452   BLOOD CULTURE  No Growth at 24 hrs. No Growth at 24 hrs.          ED Treatment:   Medication Administration from 12/01/2023 1401 to 12/01/2023 1816         Date/Time Order Dose Route Action Comments     12/01/2023 1446 EST albuterol inhalation solution 10 mg 10 mg Nebulization Given --     12/01/2023 1446 EST ipratropium (ATROVENT) 0.02 % inhalation solution 1 mg 1 mg Nebulization Given --     12/01/2023 1446 EST sodium chloride 0.9 % inhalation solution 12 mL 12 mL Nebulization Given --     12/01/2023 1610 EST sodium chloride 0.9 % bolus 1,000 mL 1,000 mL Intravenous New Bag --     12/01/2023 1728 EST azithromycin (ZITHROMAX) tablet 500 mg 500 mg Oral Given --          Past Medical History:   Diagnosis Date    Abnormal ECG 2021 OR 2022    Alcoholism ([address]) 1984    sober 38 years    Arthritis 2008 beginning    Bilateral carotid artery stenosis     Callus     Cancer (HCC)     skin    Chronic diastolic heart failure (HCC)     Chronic ischemic heart disease     Chronic kidney disease     stage 3    Colon polyp     COPD (chronic obstructive pulmonary disease) ([facility])     Coronary artery disease     hx stents, MI, PCI    COVID 11/2021    CPAP (continuous positive airway pressure) dependence     Disease of thyroid gland 2017    nodules    Emphysema of lung ([address]) 1995    started    Hearing loss     History of transfusion 1995    Hyperlipidemia     Hypertension     Intracranial aneurysm 04/25/2023    Lung nodule 2023    x rays    MI (myocardial infarction) ([address]) 1995    Myocardial infarction ([address]) 1995    Pneumonia     Pneumothorax 02/20/2023    collapsed lung    Prostate cancer ([facility])     RSV (respiratory syncytial virus infection) 10/2022    S/P carotid endarterectomy     Shortness of breath     O2 2 l/nc PRN    Sleep apnea     Sleep apnea, obstructive     Stented coronary artery        Admitting Diagnosis: Shortness of breath [R06.02]  COPD exacerbation ([facility]) [J44.1]  Age/Sex: 77 y.o. male  Admission Orders:  Scheduled Medications:  aspirin, 81 mg, Oral, Daily  atorvastatin, 80 mg, Oral, Daily With Dinner  azithromycin, 500 mg, Oral, Q24H  cefTRIAXone, 1,000 mg, Intravenous, Q24H  Empagliflozin, 10 mg, Oral, QAM  famotidine, 20 mg, Oral, HS  furosemide, 20 mg, Oral, Daily  gabapentin, 300 mg, Oral, HS  guaiFENesin, 600 mg, Oral, BID  heparin (porcine), 5,000 Units, Subcutaneous, Q8H SVETLANA  ipratropium, 0.5 mg, Nebulization, TID  levalbuterol, 1.25 mg, Nebulization, TID  methylPREDNISolone sodium succinate, 40 mg, Intravenous, Q8H  metoprolol succinate, 12.5 mg, Oral, BID  pantoprazole, 40 mg, Oral, Early Morning      Continuous IV Infusions:     PRN Meds:  acetaminophen, 650 mg, Oral, Q6H PRN  albuterol, 2 puff, Inhalation, Q4H PRN  ALPRAZolam, 0.25 mg, Oral, HS PRN  guaiFENesin, 200 mg, Oral, Q4H PRN  methocarbamol, 500 mg, Oral, TID PRN  sodium chloride, 1 spray, Each Nare, Q1H PRN        IP CONSULT TO CASE MANAGEMENT    Network Utilization Review Department  ATTENTION: Please call with any questions or concerns to [phone] and carefully listen to the prompts so that you are directed to the right person. All voicemails are confidential.   For Discharge needs, contact Care Management DC Support Team at [phone] opt. 2  Send all requests for admission clinical reviews, approved or denied determinations and any other requests to dedicated fax number below belonging to the campus where the patient is receiving treatment.  List of dedicated fax numbers for the Facilities:  [place] DENIALS (Administrative/Medical Necessity) [phone]   DISCHARGE SUPPORT TEAM (NETWORK) [id number] (Maternity/NICU/Pediatrics) [phone]   [address] [facility] 1000 [facility] [phone]   15[address] [facility] 207 [facility] [address] [address] [facility] [address] [facility]  [facility] [phone]

## 2023-12-02 NOTE — PLAN OF CARE
Problem: Potential for Falls  Goal: Patient will remain free of falls  Description: INTERVENTIONS:  - Educate patient/family on patient safety including physical limitations  - Instruct patient to call for assistance with activity   - Consult OT/PT to assist with strengthening/mobility   - Keep Call bell within reach  - Keep bed low and locked with side rails adjusted as appropriate  - Keep care items and personal belongings within reach  - Initiate and maintain comfort rounds  - Make Fall Risk Sign visible to staff  - Offer Toileting in advance of need  - Initiate/Maintain bed alarm  - Obtain necessary fall risk management equipment  - Apply yellow socks and bracelet for high fall risk patients  - Consider moving patient to room near nurses station  Outcome: Progressing     Problem: INFECTION - ADULT  Goal: Absence or prevention of progression during hospitalization  Description: INTERVENTIONS:  - Assess and monitor for signs and symptoms of infection  - Monitor lab/diagnostic results  - Monitor all insertion sites, i.e. indwelling lines, tubes, and drains  - Monitor endotracheal if appropriate and nasal secretions for changes in amount and color  - [place] appropriate cooling/warming therapies per order  - Administer medications as ordered  - Instruct and encourage patient and family to use good hand hygiene technique  - Identify and instruct in appropriate isolation precautions for identified infection/condition  Outcome: Progressing     Problem: DISCHARGE PLANNING  Goal: Discharge to home or other facility with appropriate resources  Description: INTERVENTIONS:  - Identify barriers to discharge w/patient and caregiver  - Arrange for needed discharge resources and transportation as appropriate  - Identify discharge learning needs (meds, wound care, etc.)  - Refer to Case Management Department for coordinating discharge planning if the patient needs post-hospital services based on physician/advanced practitioner order or complex needs related to functional status, cognitive ability, or social support system  Outcome: Progressing     Problem: Knowledge Deficit  Goal: Patient/family/caregiver demonstrates understanding of disease process, treatment plan, medications, and discharge instructions  Description: Complete learning assessment and assess knowledge base.   Interventions:  - Provide teaching at level of understanding  - Provide teaching via preferred learning methods  Outcome: Progressing     Problem: RESPIRATORY - ADULT  Goal: Achieves optimal ventilation and oxygenation  Description: INTERVENTIONS:  - Assess for changes in respiratory status  - Assess for changes in mentation and behavior  - Position to facilitate oxygenation and minimize respiratory effort  - Oxygen administered by appropriate delivery if ordered  - Initiate smoking cessation education as indicated  - Encourage broncho-pulmonary hygiene including cough, deep breathe, Incentive Spirometry  - Assess the need for suctioning and aspirate as needed  - Assess and instruct to report SOB or any respiratory difficulty  - Respiratory Therapy support as indicated  Outcome: Progressing

## 2023-12-02 NOTE — PLAN OF CARE
Problem: Potential for Falls  Goal: Patient will remain free of falls  Description: INTERVENTIONS:  - Educate patient/family on patient safety including physical limitations  - Instruct patient to call for assistance with activity   - Consult OT/PT to assist with strengthening/mobility   - Keep Call bell within reach  - Keep bed low and locked with side rails adjusted as appropriate  - Keep care items and personal belongings within reach  - Initiate and maintain comfort rounds  - Make Fall Risk Sign visible to staff  - Offer Toileting in advance of need  - Initiate/Maintain bed alarm  - Obtain necessary fall risk management equipment  - Apply yellow socks and bracelet for high fall risk patients  - Consider moving patient to room near nurses station  Outcome: Progressing     Problem: INFECTION - ADULT  Goal: Absence or prevention of progression during hospitalization  Description: INTERVENTIONS:  - Assess and monitor for signs and symptoms of infection  - Monitor lab/diagnostic results  - Monitor all insertion sites, i.e. indwelling lines, tubes, and drains  - Monitor endotracheal if appropriate and nasal secretions for changes in amount and color  - [place] appropriate cooling/warming therapies per order  - Administer medications as ordered  - Instruct and encourage patient and family to use good hand hygiene technique  - Identify and instruct in appropriate isolation precautions for identified infection/condition  Outcome: Progressing     Problem: DISCHARGE PLANNING  Goal: Discharge to home or other facility with appropriate resources  Description: INTERVENTIONS:  - Identify barriers to discharge w/patient and caregiver  - Arrange for needed discharge resources and transportation as appropriate  - Identify discharge learning needs (meds, wound care, etc.)  - Refer to Case Management Department for coordinating discharge planning if the patient needs post-hospital services based on physician/advanced practitioner order or complex needs related to functional status, cognitive ability, or social support system  Outcome: Progressing     Problem: Knowledge Deficit  Goal: Patient/family/caregiver demonstrates understanding of disease process, treatment plan, medications, and discharge instructions  Description: Complete learning assessment and assess knowledge base.   Interventions:  - Provide teaching at level of understanding  - Provide teaching via preferred learning methods  Outcome: Progressing     Problem: RESPIRATORY - ADULT  Goal: Achieves optimal ventilation and oxygenation  Description: INTERVENTIONS:  - Assess for changes in respiratory status  - Assess for changes in mentation and behavior  - Position to facilitate oxygenation and minimize respiratory effort  - Oxygen administered by appropriate delivery if ordered  - Initiate smoking cessation education as indicated  - Encourage broncho-pulmonary hygiene including cough, deep breathe, Incentive Spirometry  - Assess the need for suctioning and aspirate as needed  - Assess and instruct to report SOB or any respiratory difficulty  - Respiratory Therapy support as indicated  Outcome: Progressing

## 2023-12-02 NOTE — ASSESSMENT & PLAN NOTE
CPAP at night Patient was admitted with incredible nausea vomiting and abdominal pain.  CT scan was done with constipation possible secondary to opiates secondary to bone metastatic of the lung cancer no other acute problem  She was given IV emetics and also laxative and patient symptoms resolved and discharged in stable condition to follow-up with oncology as outpatient.

## 2023-12-02 NOTE — ASSESSMENT & PLAN NOTE
COVID-19 positive on 12/1/2023  Requiring 5 L NC supplemental O2  Moderate COVID-19 pathway  Decadron  Baricitinib  Remdesivir  Wean O2 as tolerated

## 2023-12-02 NOTE — ASSESSMENT & PLAN NOTE
Patient presents with shortness of breath and wheezing  Requiring 5 L of nasal cannula supplemental O2 up from baseline of 2-3 L  Likely COPD with acute exacerbation 2/2 COVID-19  IV corticosteroids as per COVID-19 plan  Atrovent and Xopenex  Respiratory and airway clearance protocols  Azithromycin 500 mg oral daily

## 2023-12-02 NOTE — RESPIRATORY THERAPY NOTE
12/01/23 4695   Respiratory Assessment   Resp Comments pt placed on own preset machine with 3L   O2 Device cpap   Non-Invasive Information   O2 Interface Device Nasal mask   Non-Invasive Ventilation Mode CPAP   Waiver signed Yes   $ Pulse Oximetry Spot Check Charge Completed   Non-Invasive Settings   FiO2 (%)   (with 3l)   PEEP/CPAP (cm H2O)   (preset)

## 2023-12-02 NOTE — PROGRESS NOTES
[address] [facility]  Progress Note  Name: Faraz Greene  MRN: [id number]  Unit/Bed#: -01 I Date of Admission: 12/1/2023   Date of Service: 12/2/2023 I Hospital Day: 0    Assessment/Plan   * COPD with acute exacerbation [facility])  Assessment & Plan  Patient presents with shortness of breath and wheezing  Requiring 5 L of nasal cannula supplemental O2 up from baseline of 2-3 L  Likely COPD with acute exacerbation 2/2 COVID-19  IV corticosteroids as per COVID-19 plan  Atrovent and Xopenex  Respiratory and airway clearance protocols  Azithromycin 500 mg oral daily    Acute and chronic respiratory failure with hypoxia [facility])  Assessment & Plan  Present on admission as evidenced by patient requiring 5 L of nasal cannula supplemental O2  Patient requires 2-3 L of nasal cannula supplemental O2  Likely secondary to COPD exacerbation  Wean O2 as tolerated  Goal SpO2 greater than 88%  Respiratory protocol    COVID-19  Assessment & Plan  COVID-19 positive on 12/1/2023  Requiring 5 L NC supplemental O2  Moderate COVID-19 pathway  Decadron  Baricitinib  Remdesivir  Wean O2 as tolerated    Chronic systolic congestive heart failure (HCC)  Assessment & Plan  Not in acute exacerbation  Continue home beta-blocker and Entresto  Outpatient follow-up with cardiology    LAMBERTO on CPAP  Assessment & Plan  CPAP at night    GERD (gastroesophageal reflux disease)  Assessment & Plan  Continue home PPI and H2 blocker    Hyperlipidemia  Assessment & Plan  Continue home statin therapy               VTE Pharmacologic Prophylaxis: VTE Score: 6 High Risk (Score >/= 5) - Pharmacological DVT Prophylaxis Ordered: heparin. Sequential Compression Devices Ordered. Mobility:   Basic Mobility Inpatient Raw Score: 24  JH-HLM Goal: 8: Walk 250 feet or more  JH-HLM Achieved: 8: Walk 250 feet ot more  HLM Goal achieved. Continue to encourage appropriate mobility.     Patient Centered Rounds: I performed bedside rounds with nursing staff today.   Discussions with Specialists or Other Care Team Provider: Nursing    Education and Discussions with Family / Patient: Updated  (daughter) via phone. Total Time Spent on Date of Encounter in care of patient: 35 mins. This time was spent on one or more of the following: performing physical exam; counseling and coordination of care; obtaining or reviewing history; documenting in the medical record; reviewing/ordering tests, medications or procedures; communicating with other healthcare professionals and discussing with patient's family/caregivers. Current Length of Stay: 0 day(s)  Current Patient Status: Observation   Certification Statement: The patient will continue to require additional inpatient hospital stay due to COVID-19 viral pneumonia  Discharge Plan: Anticipate discharge tomorrow to home. Code Status: Level 3 - DNAR and DNI    Subjective:   Patient seen and examined at bedside. No acute events overnight. Denies chest pain, SOB, diaphoresis, nausea/vomiting/diarrhea, fevers/chills. Objective:     Vitals:   Temp (24hrs), Av.4 °F (36.9 °C), Min:97.5 °F (36.4 °C), Max:99.8 °F (37.7 °C)    Temp:  [97.5 °F (36.4 °C)-99.8 °F (37.7 °C)] 97.8 °F (36.6 °C)  HR:  [] 109  Resp:  [16-27] 16  BP: ()/(51-66) 99/62  SpO2:  [80 %-100 %] 90 %  Body mass index is 26.01 kg/m². Input and Output Summary (last 24 hours):   No intake or output data in the 24 hours ending 23 1017    Physical Exam:   Physical Exam  Vitals and nursing note reviewed. Constitutional:       General: He is not in acute distress. Appearance: He is well-developed. HENT:      Head: Normocephalic and atraumatic. Eyes:      Conjunctiva/sclera: Conjunctivae normal.   Cardiovascular:      Rate and Rhythm: Normal rate and regular rhythm. Heart sounds: No murmur heard. Pulmonary:      Effort: Pulmonary effort is normal. No respiratory distress. Breath sounds: Normal breath sounds. Abdominal:      Palpations: Abdomen is soft. Tenderness: There is no abdominal tenderness. Musculoskeletal:         General: No swelling. Cervical back: Neck supple. Skin:     General: Skin is warm and dry. Capillary Refill: Capillary refill takes less than 2 seconds. Neurological:      Mental Status: He is alert. Psychiatric:         Mood and Affect: Mood normal.          Additional Data:     Labs:  Results from last 7 days   Lab Units 12/02/23  0450 12/01/23  1452   WBC Thousand/uL 7.64 12.37*   HEMOGLOBIN g/dL 12.2 13.0   HEMATOCRIT % 40.2 42.1   PLATELETS Thousands/uL 214 246   NEUTROS PCT %  --  85*   LYMPHS PCT %  --  5*   LYMPHO PCT % 6*  --    MONOS PCT %  --  10   MONO PCT % 2*  --    EOS PCT % 0 0     Results from last 7 days   Lab Units 12/02/23  0450 12/01/23  1452   SODIUM mmol/L 140 142   POTASSIUM mmol/L 4.0 3.2*   CHLORIDE mmol/L 100 100   CO2 mmol/L 33* 36*   BUN mg/dL 17 19   CREATININE mg/dL 1.04 1.07   ANION GAP mmol/L 7 6   CALCIUM mg/dL 8.3* 8.7   ALBUMIN g/dL  --  3.6   TOTAL BILIRUBIN mg/dL  --  0.66   ALK PHOS U/L  --  53   ALT U/L  --  23   AST U/L  --  25   GLUCOSE RANDOM mg/dL 147* 161*     Results from last 7 days   Lab Units 12/01/23  1452   INR  0.97             Results from last 7 days   Lab Units 12/02/23  0450 12/01/23  1801 12/01/23  1452   LACTIC ACID mmol/L  --  1.4 2.8*   PROCALCITONIN ng/ml 0.08  --   --        Lines/Drains:  Invasive Devices       Peripheral Intravenous Line  Duration             Peripheral IV 12/01/23 Right Antecubital <1 day                          Imaging: Reviewed radiology reports from this admission including: chest CT scan    Recent Cultures (last 7 days):   Results from last 7 days   Lab Units 12/01/23  1515 12/01/23  1452   BLOOD CULTURE  Received in Microbiology Lab. Culture in Progress. Received in Microbiology Lab. Culture in Progress.        Last 24 Hours Medication List:   Current Facility-Administered Medications Medication Dose Route Frequency Provider Last Rate    acetaminophen  650 mg Oral Q6H PRN Mary Gonzalez, DO      ALPRAZolam  0.25 mg Oral HS PRN Mary Gonzalez, DO      aspirin  81 mg Oral Daily Mary Lisa, DO      atorvastatin  80 mg Oral Daily With [employer], DO      azithromycin  500 mg Oral Q24H Mary Lisa, DO      baricitinib  4 mg Oral Q24H Mary Lisa, DO      cefTRIAXone  1,000 mg Intravenous Q24H Mary Gonzalez, DO 1,000 mg (12/01/23 2020)    dexamethasone  6 mg Intravenous Q24H Mary Lisa, DO      Empagliflozin  10 mg Oral QAM Mary Gonzalez, DO      famotidine  20 mg Oral HS Mary Gonzalez, DO      furosemide  20 mg Oral Daily Mary Lisa, DO      gabapentin  300 mg Oral HS Mary Lisa, DO      guaiFENesin  600 mg Oral BID Mary Gonzalez,       guaiFENesin  200 mg Oral Q4H PRN Randy Byrd PA-C      heparin (porcine)  5,000 Units Subcutaneous Q8H [facility] Mary Gonzalez,       ipratropium  0.5 mg Nebulization TID Mary Gonzalez,       levalbuterol  1.25 mg Nebulization TID Mary Gonzalez,       methocarbamol  500 mg Oral TID PRN Mary Gonzalez,       metoprolol succinate  12.5 mg Oral BID Mary Gonzalez,       pantoprazole  40 mg Oral Early Morning Mary Gonzalez DO      remdesivir  200 mg Intravenous Q24H Mary Gonzalez DO      Followed by    Apoorva Mullins ON 12/3/2023] remdesivir  100 mg Intravenous Q24H Mary Gonzalez DO          Today, Patient Was Seen By: Mary Gonzalez DO    **Please Note: This note may have been constructed using a voice recognition system. **

## 2023-12-03 LAB
ANION GAP SERPL CALCULATED.3IONS-SCNC: 7 MMOL/L
ATRIAL RATE: 100 BPM
ATRIAL RATE: 101 BPM
BASOPHILS # BLD AUTO: 0.03 THOUSANDS/ÂΜL (ref 0–0.1)
BASOPHILS NFR BLD AUTO: 0 % (ref 0–1)
BUN SERPL-MCNC: 23 MG/DL (ref 5–25)
CALCIUM SERPL-MCNC: 8.5 MG/DL (ref 8.4–10.2)
CHLORIDE SERPL-SCNC: 102 MMOL/L (ref 96–108)
CO2 SERPL-SCNC: 30 MMOL/L (ref 21–32)
CREAT SERPL-MCNC: 1.11 MG/DL (ref 0.6–1.3)
EOSINOPHIL # BLD AUTO: 0 THOUSAND/ÂΜL (ref 0–0.61)
EOSINOPHIL NFR BLD AUTO: 0 % (ref 0–6)
ERYTHROCYTE [DISTWIDTH] IN BLOOD BY AUTOMATED COUNT: 16.4 % (ref 11.6–15.1)
GFR SERPL CREATININE-BSD FRML MDRD: 62 ML/MIN/1.73SQ M
GLUCOSE SERPL-MCNC: 137 MG/DL (ref 65–140)
HCT VFR BLD AUTO: 40.3 % (ref 36.5–49.3)
HGB BLD-MCNC: 12.8 G/DL (ref 12–17)
IMM GRANULOCYTES # BLD AUTO: 0.12 THOUSAND/UL (ref 0–0.2)
IMM GRANULOCYTES NFR BLD AUTO: 1 % (ref 0–2)
LYMPHOCYTES # BLD AUTO: 0.83 THOUSANDS/ÂΜL (ref 0.6–4.47)
LYMPHOCYTES NFR BLD AUTO: 6 % (ref 14–44)
MAGNESIUM SERPL-MCNC: 2.2 MG/DL (ref 1.9–2.7)
MCH RBC QN AUTO: 31.8 PG (ref 26.8–34.3)
MCHC RBC AUTO-ENTMCNC: 31.8 G/DL (ref 31.4–37.4)
MCV RBC AUTO: 100 FL (ref 82–98)
MONOCYTES # BLD AUTO: 1.17 THOUSAND/ÂΜL (ref 0.17–1.22)
MONOCYTES NFR BLD AUTO: 9 % (ref 4–12)
NEUTROPHILS # BLD AUTO: 11.18 THOUSANDS/ÂΜL (ref 1.85–7.62)
NEUTS SEG NFR BLD AUTO: 84 % (ref 43–75)
NRBC BLD AUTO-RTO: 0 /100 WBCS
P AXIS: 71 DEGREES
P AXIS: 85 DEGREES
PHOSPHATE SERPL-MCNC: 4.6 MG/DL (ref 2.3–4.1)
PLATELET # BLD AUTO: 252 THOUSANDS/UL (ref 149–390)
PMV BLD AUTO: 10.8 FL (ref 8.9–12.7)
POTASSIUM SERPL-SCNC: 4 MMOL/L (ref 3.5–5.3)
PR INTERVAL: 152 MS
PR INTERVAL: 154 MS
QRS AXIS: 80 DEGREES
QRS AXIS: 80 DEGREES
QRSD INTERVAL: 94 MS
QRSD INTERVAL: 96 MS
QT INTERVAL: 372 MS
QT INTERVAL: 374 MS
QTC INTERVAL: 487 MS
QTC INTERVAL: 487 MS
RBC # BLD AUTO: 4.03 MILLION/UL (ref 3.88–5.62)
SODIUM SERPL-SCNC: 139 MMOL/L (ref 135–147)
T WAVE AXIS: -18 DEGREES
T WAVE AXIS: -9 DEGREES
VENTRICULAR RATE: 102 BPM
VENTRICULAR RATE: 103 BPM
WBC # BLD AUTO: 13.33 THOUSAND/UL (ref 4.31–10.16)

## 2023-12-03 PROCEDURE — 94664 DEMO&/EVAL PT USE INHALER: CPT

## 2023-12-03 PROCEDURE — 83735 ASSAY OF MAGNESIUM: CPT | Performed by: INTERNAL MEDICINE

## 2023-12-03 PROCEDURE — 93010 ELECTROCARDIOGRAM REPORT: CPT | Performed by: INTERNAL MEDICINE

## 2023-12-03 PROCEDURE — 94640 AIRWAY INHALATION TREATMENT: CPT

## 2023-12-03 PROCEDURE — 85025 COMPLETE CBC W/AUTO DIFF WBC: CPT | Performed by: INTERNAL MEDICINE

## 2023-12-03 PROCEDURE — 99232 SBSQ HOSP IP/OBS MODERATE 35: CPT | Performed by: INTERNAL MEDICINE

## 2023-12-03 PROCEDURE — 80048 BASIC METABOLIC PNL TOTAL CA: CPT | Performed by: INTERNAL MEDICINE

## 2023-12-03 PROCEDURE — 84100 ASSAY OF PHOSPHORUS: CPT | Performed by: INTERNAL MEDICINE

## 2023-12-03 PROCEDURE — 94760 N-INVAS EAR/PLS OXIMETRY 1: CPT

## 2023-12-03 RX ORDER — ECHINACEA PURPUREA EXTRACT 125 MG
TABLET ORAL
Status: DISPENSED
Start: 2023-12-03 | End: 2023-12-03

## 2023-12-03 RX ORDER — ECHINACEA PURPUREA EXTRACT 125 MG
1 TABLET ORAL
Status: DISCONTINUED | OUTPATIENT
Start: 2023-12-03 | End: 2023-12-09 | Stop reason: HOSPADM

## 2023-12-03 RX ORDER — ALBUTEROL SULFATE 90 UG/1
2 AEROSOL, METERED RESPIRATORY (INHALATION) EVERY 4 HOURS PRN
Status: DISCONTINUED | OUTPATIENT
Start: 2023-12-03 | End: 2023-12-07

## 2023-12-03 RX ORDER — ECHINACEA PURPUREA EXTRACT 125 MG
1 TABLET ORAL
Status: DISCONTINUED | OUTPATIENT
Start: 2023-12-03 | End: 2023-12-03

## 2023-12-03 RX ORDER — ALBUTEROL SULFATE 90 UG/1
2 AEROSOL, METERED RESPIRATORY (INHALATION) EVERY 4 HOURS PRN
Status: CANCELLED | OUTPATIENT
Start: 2023-12-03

## 2023-12-03 RX ADMIN — IPRATROPIUM BROMIDE 0.5 MG: 0.5 SOLUTION RESPIRATORY (INHALATION) at 07:44

## 2023-12-03 RX ADMIN — PANTOPRAZOLE SODIUM 40 MG: 40 TABLET, DELAYED RELEASE ORAL at 05:19

## 2023-12-03 RX ADMIN — REMDESIVIR 100 MG: 100 INJECTION, POWDER, LYOPHILIZED, FOR SOLUTION INTRAVENOUS at 09:54

## 2023-12-03 RX ADMIN — SALINE NASAL SPRAY 1 SPRAY: 1.5 SOLUTION NASAL at 22:39

## 2023-12-03 RX ADMIN — DEXAMETHASONE SODIUM PHOSPHATE 6 MG: 10 INJECTION, SOLUTION INTRAMUSCULAR; INTRAVENOUS at 09:54

## 2023-12-03 RX ADMIN — ALBUTEROL SULFATE 2 PUFF: 108 INHALANT RESPIRATORY (INHALATION) at 13:02

## 2023-12-03 RX ADMIN — EMPAGLIFLOZIN 10 MG: 10 TABLET, FILM COATED ORAL at 09:54

## 2023-12-03 RX ADMIN — HEPARIN SODIUM 5000 UNITS: 5000 INJECTION INTRAVENOUS; SUBCUTANEOUS at 22:22

## 2023-12-03 RX ADMIN — SALINE NASAL SPRAY 1 SPRAY: 1.5 SOLUTION NASAL at 09:55

## 2023-12-03 RX ADMIN — LEVALBUTEROL HYDROCHLORIDE 1.25 MG: 1.25 SOLUTION RESPIRATORY (INHALATION) at 13:41

## 2023-12-03 RX ADMIN — LEVALBUTEROL HYDROCHLORIDE 1.25 MG: 1.25 SOLUTION RESPIRATORY (INHALATION) at 19:14

## 2023-12-03 RX ADMIN — METOPROLOL SUCCINATE 12.5 MG: 25 TABLET, EXTENDED RELEASE ORAL at 22:00

## 2023-12-03 RX ADMIN — IPRATROPIUM BROMIDE 0.5 MG: 0.5 SOLUTION RESPIRATORY (INHALATION) at 13:41

## 2023-12-03 RX ADMIN — ASPIRIN 81 MG CHEWABLE TABLET 81 MG: 81 TABLET CHEWABLE at 09:54

## 2023-12-03 RX ADMIN — SALINE NASAL SPRAY 1 SPRAY: 1.5 SOLUTION NASAL at 17:03

## 2023-12-03 RX ADMIN — GUAIFENESIN 600 MG: 600 TABLET ORAL at 09:54

## 2023-12-03 RX ADMIN — LEVALBUTEROL HYDROCHLORIDE 1.25 MG: 1.25 SOLUTION RESPIRATORY (INHALATION) at 07:44

## 2023-12-03 RX ADMIN — AZITHROMYCIN DIHYDRATE 500 MG: 250 TABLET ORAL at 17:02

## 2023-12-03 RX ADMIN — CEFTRIAXONE 1000 MG: 1 INJECTION, SOLUTION INTRAVENOUS at 18:34

## 2023-12-03 RX ADMIN — ATORVASTATIN CALCIUM 80 MG: 80 TABLET, FILM COATED ORAL at 17:02

## 2023-12-03 RX ADMIN — SALINE NASAL SPRAY 1 SPRAY: 1.5 SOLUTION NASAL at 05:17

## 2023-12-03 RX ADMIN — BARICITINIB 4 MG: 2 TABLET, FILM COATED ORAL at 09:54

## 2023-12-03 RX ADMIN — HEPARIN SODIUM 5000 UNITS: 5000 INJECTION INTRAVENOUS; SUBCUTANEOUS at 05:19

## 2023-12-03 RX ADMIN — GABAPENTIN 300 MG: 300 CAPSULE ORAL at 22:22

## 2023-12-03 RX ADMIN — GUAIFENESIN 600 MG: 600 TABLET ORAL at 17:02

## 2023-12-03 RX ADMIN — IPRATROPIUM BROMIDE 0.5 MG: 0.5 SOLUTION RESPIRATORY (INHALATION) at 19:14

## 2023-12-03 RX ADMIN — FLUTICASONE PROPIONATE 1 SPRAY: 50 SPRAY, METERED NASAL at 09:54

## 2023-12-03 RX ADMIN — HEPARIN SODIUM 5000 UNITS: 5000 INJECTION INTRAVENOUS; SUBCUTANEOUS at 17:02

## 2023-12-03 RX ADMIN — FAMOTIDINE 20 MG: 20 TABLET ORAL at 22:22

## 2023-12-03 NOTE — ASSESSMENT & PLAN NOTE
Patient presents with shortness of breath and wheezing  Requiring 5 L of nasal cannula supplemental O2 up from baseline of 2-3 L  Likely COPD with acute exacerbation 2/2 COVID-19  IV corticosteroids as per COVID-19 plan  Atrovent and Xopenex  Respiratory and airway clearance protocols  Azithromycin 500 mg oral daily and ceftriaxone 1g IV daily, D4

## 2023-12-03 NOTE — PROGRESS NOTES
[address] [facility]  Progress Note  Name: Suyapa Rojas  MRN: [id number]  Unit/Bed#: -01 I Date of Admission: 12/1/2023   Date of Service: 12/3/2023 I Hospital Day: 1    Assessment/Plan   * COPD with acute exacerbation [facility])  Assessment & Plan  Patient presents with shortness of breath and wheezing  Requiring 5 L of nasal cannula supplemental O2 up from baseline of 2-3 L  Likely COPD with acute exacerbation 2/2 COVID-19  IV corticosteroids as per COVID-19 plan  Atrovent and Xopenex  Respiratory and airway clearance protocols  Azithromycin 500 mg oral daily    Acute and chronic respiratory failure with hypoxia [facility])  Assessment & Plan  Present on admission as evidenced by patient requiring 5 L of nasal cannula supplemental O2  Patient requires 2-3 L of nasal cannula supplemental O2  Likely secondary to COPD exacerbation  Wean O2 as tolerated  Goal SpO2 greater than 88%  Respiratory protocol    COVID-19  Assessment & Plan  COVID-19 positive on 12/1/2023  Requiring 5 L NC supplemental O2  Moderate COVID-19 pathway  Decadron  Baricitinib  Remdesivir  Wean O2 as tolerated    Chronic systolic congestive heart failure (HCC)  Assessment & Plan  Not in acute exacerbation  Continue home beta-blocker and Entresto  Outpatient follow-up with cardiology    LAMBERTO on CPAP  Assessment & Plan  CPAP at night    GERD (gastroesophageal reflux disease)  Assessment & Plan  Continue home PPI and H2 blocker    Hyperlipidemia  Assessment & Plan  Continue home statin therapy               VTE Pharmacologic Prophylaxis: VTE Score: 6 High Risk (Score >/= 5) - Pharmacological DVT Prophylaxis Ordered: heparin. Sequential Compression Devices Ordered. Mobility:   Basic Mobility Inpatient Raw Score: 22  JH-HLM Goal: 7: Walk 25 feet or more  JH-HLM Achieved: 7: Walk 25 feet or more  HLM Goal achieved. Continue to encourage appropriate mobility.     Patient Centered Rounds: I performed bedside rounds with nursing staff today.   Discussions with Specialists or Other Care Team Provider: Nursing    Education and Discussions with Family / Patient: Updated  (daughter) via phone. Total Time Spent on Date of Encounter in care of patient: 35 mins. This time was spent on one or more of the following: performing physical exam; counseling and coordination of care; obtaining or reviewing history; documenting in the medical record; reviewing/ordering tests, medications or procedures; communicating with other healthcare professionals and discussing with patient's family/caregivers. Current Length of Stay: 1 day(s)  Current Patient Status: Inpatient   Certification Statement: The patient will continue to require additional inpatient hospital stay due to COVID-19 viral pneumonia  Discharge Plan: Anticipate discharge tomorrow to home. Code Status: Level 3 - DNAR and DNI    Subjective:   Patient seen and examined at bedside. No acute events overnight. Denies chest pain, SOB, diaphoresis, nausea/vomiting/diarrhea, fevers/chills. Objective:     Vitals:   Temp (24hrs), Av.1 °F (36.7 °C), Min:98.1 °F (36.7 °C), Max:98.1 °F (36.7 °C)    Temp:  [98.1 °F (36.7 °C)] 98.1 °F (36.7 °C)  HR:  [] 99  Resp:  [20] 20  BP: ()/(60-78) 107/78  SpO2:  [90 %-97 %] 96 %  Body mass index is 26.01 kg/m². Input and Output Summary (last 24 hours): Intake/Output Summary (Last 24 hours) at 12/3/2023 0926  Last data filed at 2023 2236  Gross per 24 hour   Intake --   Output 850 ml   Net -850 ml       Physical Exam:   Physical Exam  Vitals and nursing note reviewed. Constitutional:       General: He is not in acute distress. Appearance: He is well-developed. HENT:      Head: Normocephalic and atraumatic. Eyes:      Conjunctiva/sclera: Conjunctivae normal.   Cardiovascular:      Rate and Rhythm: Normal rate and regular rhythm. Heart sounds: No murmur heard.   Pulmonary:      Effort: Pulmonary effort is normal. No respiratory distress. Breath sounds: Normal breath sounds. Abdominal:      Palpations: Abdomen is soft. Tenderness: There is no abdominal tenderness. Musculoskeletal:         General: No swelling. Cervical back: Neck supple. Skin:     General: Skin is warm and dry. Capillary Refill: Capillary refill takes less than 2 seconds. Neurological:      Mental Status: He is alert. Psychiatric:         Mood and Affect: Mood normal.          Additional Data:     Labs:  Results from last 7 days   Lab Units 12/03/23  0515   WBC Thousand/uL 13.33*   HEMOGLOBIN g/dL 12.8   HEMATOCRIT % 40.3   PLATELETS Thousands/uL 252   NEUTROS PCT % 84*   LYMPHS PCT % 6*   MONOS PCT % 9   EOS PCT % 0     Results from last 7 days   Lab Units 12/03/23  0515 12/02/23  0450 12/01/23  1452   SODIUM mmol/L 139   < > 142   POTASSIUM mmol/L 4.0   < > 3.2*   CHLORIDE mmol/L 102   < > 100   CO2 mmol/L 30   < > 36*   BUN mg/dL 23   < > 19   CREATININE mg/dL 1.11   < > 1.07   ANION GAP mmol/L 7   < > 6   CALCIUM mg/dL 8.5   < > 8.7   ALBUMIN g/dL  --   --  3.6   TOTAL BILIRUBIN mg/dL  --   --  0.66   ALK PHOS U/L  --   --  53   ALT U/L  --   --  23   AST U/L  --   --  25   GLUCOSE RANDOM mg/dL 137   < > 161*    < > = values in this interval not displayed. Results from last 7 days   Lab Units 12/01/23  1452   INR  0.97             Results from last 7 days   Lab Units 12/02/23  0450 12/01/23  1801 12/01/23  1452   LACTIC ACID mmol/L  --  1.4 2.8*   PROCALCITONIN ng/ml 0.08  --   --        Lines/Drains:  Invasive Devices       Peripheral Intravenous Line  Duration             Peripheral IV 12/01/23 Right Antecubital 1 day                          Imaging: Reviewed radiology reports from this admission including: chest CT scan    Recent Cultures (last 7 days):   Results from last 7 days   Lab Units 12/01/23  1515 12/01/23  1452   BLOOD CULTURE  No Growth at 24 hrs. No Growth at 24 hrs.        Last 24 Hours Medication List:   Current Facility-Administered Medications   Medication Dose Route Frequency Provider Last Rate    acetaminophen  650 mg Oral Q6H PRN Lugene Lighter, DO      albuterol  2 puff Inhalation Q4H PRN Lugene Lighter, DO      ALPRAZolam  0.25 mg Oral HS PRN Lugene Lighter, DO      aspirin  81 mg Oral Daily Lugene Lighter, DO      atorvastatin  80 mg Oral Daily With [employer], DO      azithromycin  500 mg Oral Q24H Lugene Lighter, DO      baricitinib  4 mg Oral Q24H Lugene Lighter, DO      cefTRIAXone  1,000 mg Intravenous Q24H Lugene Lighter, DO 1,000 mg (12/02/23 1933)    dexamethasone  6 mg Intravenous Q24H Lugene Lighter, DO      Empagliflozin  10 mg Oral QAM Lugene Lighter, DO      famotidine  20 mg Oral HS Lugene Lighter, DO      fluticasone  1 spray Each Nare Daily Lugene Lighter, DO      furosemide  20 mg Oral Daily Lugene Lighter, DO      gabapentin  300 mg Oral HS Lugene Lighter, DO      guaiFENesin  600 mg Oral BID Lugene Lighter, DO      guaiFENesin  200 mg Oral Q4H PRN Karley Roth PA-C      heparin (porcine)  5,000 Units Subcutaneous Q8H [facility] Lugene Lighter, DO      ipratropium  0.5 mg Nebulization TID Lugene Lighter, DO      levalbuterol  1.25 mg Nebulization TID Lugene Lighter, DO      methocarbamol  500 mg Oral TID PRN Lugene Lighter, DO      metoprolol succinate  12.5 mg Oral BID Lugene Lighter, DO      pantoprazole  40 mg Oral Early Morning Lugene Lighter, DO      remdesivir  100 mg Intravenous Q24H Lugene Lighter, DO      sodium chloride  1 spray Each Nare Q1H PRN Karley Roth PA-C          Today, Patient Was Seen By: Luis M Lighter, DO    **Please Note: This note may have been constructed using a voice recognition system. **

## 2023-12-03 NOTE — PLAN OF CARE
Problem: Potential for Falls  Goal: Patient will remain free of falls  Description: INTERVENTIONS:  - Educate patient/family on patient safety including physical limitations  - Instruct patient to call for assistance with activity   - Consult OT/PT to assist with strengthening/mobility   - Keep Call bell within reach  - Keep bed low and locked with side rails adjusted as appropriate  - Keep care items and personal belongings within reach  - Initiate and maintain comfort rounds  - Make Fall Risk Sign visible to staff  - Offer Toileting in advance of need  - Initiate/Maintain bed alarm  - Obtain necessary fall risk management equipment  - Apply yellow socks and bracelet for high fall risk patients  - Consider moving patient to room near nurses station  Outcome: Progressing     Problem: INFECTION - ADULT  Goal: Absence or prevention of progression during hospitalization  Description: INTERVENTIONS:  - Assess and monitor for signs and symptoms of infection  - Monitor lab/diagnostic results  - Monitor all insertion sites, i.e. indwelling lines, tubes, and drains  - Monitor endotracheal if appropriate and nasal secretions for changes in amount and color  - [place] appropriate cooling/warming therapies per order  - Administer medications as ordered  - Instruct and encourage patient and family to use good hand hygiene technique  - Identify and instruct in appropriate isolation precautions for identified infection/condition  Outcome: Progressing     Problem: DISCHARGE PLANNING  Goal: Discharge to home or other facility with appropriate resources  Description: INTERVENTIONS:  - Identify barriers to discharge w/patient and caregiver  - Arrange for needed discharge resources and transportation as appropriate  - Identify discharge learning needs (meds, wound care, etc.)  - Refer to Case Management Department for coordinating discharge planning if the patient needs post-hospital services based on physician/advanced practitioner order or complex needs related to functional status, cognitive ability, or social support system  Outcome: Progressing     Problem: Knowledge Deficit  Goal: Patient/family/caregiver demonstrates understanding of disease process, treatment plan, medications, and discharge instructions  Description: Complete learning assessment and assess knowledge base.   Interventions:  - Provide teaching at level of understanding  - Provide teaching via preferred learning methods  Outcome: Progressing     Problem: RESPIRATORY - ADULT  Goal: Achieves optimal ventilation and oxygenation  Description: INTERVENTIONS:  - Assess for changes in respiratory status  - Assess for changes in mentation and behavior  - Position to facilitate oxygenation and minimize respiratory effort  - Oxygen administered by appropriate delivery if ordered  - Initiate smoking cessation education as indicated  - Encourage broncho-pulmonary hygiene including cough, deep breathe, Incentive Spirometry  - Assess the need for suctioning and aspirate as needed  - Assess and instruct to report SOB or any respiratory difficulty  - Respiratory Therapy support as indicated  Outcome: Progressing

## 2023-12-03 NOTE — ASSESSMENT & PLAN NOTE
Not in acute exacerbation  Continue home beta-blocker and Entresto  Continue Jardiance  Outpatient follow-up with cardiology

## 2023-12-03 NOTE — ASSESSMENT & PLAN NOTE
COVID-19 positive on 12/1/2023  Requiring 5 L NC supplemental O2  Moderate COVID-19 pathway  Continue Decadron  Continue Baricitinib, D3  Continue Remdesivir, D3  Wean O2 as tolerated

## 2023-12-04 LAB
ANION GAP SERPL CALCULATED.3IONS-SCNC: 8 MMOL/L
BASOPHILS # BLD AUTO: 0.02 THOUSANDS/ÂΜL (ref 0–0.1)
BASOPHILS NFR BLD AUTO: 0 % (ref 0–1)
BUN SERPL-MCNC: 29 MG/DL (ref 5–25)
CALCIUM SERPL-MCNC: 8.3 MG/DL (ref 8.4–10.2)
CHLORIDE SERPL-SCNC: 102 MMOL/L (ref 96–108)
CO2 SERPL-SCNC: 31 MMOL/L (ref 21–32)
CREAT SERPL-MCNC: 1.09 MG/DL (ref 0.6–1.3)
EOSINOPHIL # BLD AUTO: 0 THOUSAND/ÂΜL (ref 0–0.61)
EOSINOPHIL NFR BLD AUTO: 0 % (ref 0–6)
ERYTHROCYTE [DISTWIDTH] IN BLOOD BY AUTOMATED COUNT: 16.3 % (ref 11.6–15.1)
GFR SERPL CREATININE-BSD FRML MDRD: 64 ML/MIN/1.73SQ M
GLUCOSE SERPL-MCNC: 110 MG/DL (ref 65–140)
HCT VFR BLD AUTO: 40.7 % (ref 36.5–49.3)
HGB BLD-MCNC: 12.5 G/DL (ref 12–17)
IMM GRANULOCYTES # BLD AUTO: 0.14 THOUSAND/UL (ref 0–0.2)
IMM GRANULOCYTES NFR BLD AUTO: 1 % (ref 0–2)
LYMPHOCYTES # BLD AUTO: 1.02 THOUSANDS/ÂΜL (ref 0.6–4.47)
LYMPHOCYTES NFR BLD AUTO: 9 % (ref 14–44)
MAGNESIUM SERPL-MCNC: 2.3 MG/DL (ref 1.9–2.7)
MCH RBC QN AUTO: 30.8 PG (ref 26.8–34.3)
MCHC RBC AUTO-ENTMCNC: 30.7 G/DL (ref 31.4–37.4)
MCV RBC AUTO: 100 FL (ref 82–98)
MONOCYTES # BLD AUTO: 0.87 THOUSAND/ÂΜL (ref 0.17–1.22)
MONOCYTES NFR BLD AUTO: 7 % (ref 4–12)
NEUTROPHILS # BLD AUTO: 9.68 THOUSANDS/ÂΜL (ref 1.85–7.62)
NEUTS SEG NFR BLD AUTO: 83 % (ref 43–75)
NRBC BLD AUTO-RTO: 0 /100 WBCS
PHOSPHATE SERPL-MCNC: 4.7 MG/DL (ref 2.3–4.1)
PLATELET # BLD AUTO: 267 THOUSANDS/UL (ref 149–390)
PMV BLD AUTO: 10.9 FL (ref 8.9–12.7)
POTASSIUM SERPL-SCNC: 4.3 MMOL/L (ref 3.5–5.3)
PROCALCITONIN SERPL-MCNC: 0.07 NG/ML
RBC # BLD AUTO: 4.06 MILLION/UL (ref 3.88–5.62)
SODIUM SERPL-SCNC: 141 MMOL/L (ref 135–147)
WBC # BLD AUTO: 11.73 THOUSAND/UL (ref 4.31–10.16)

## 2023-12-04 PROCEDURE — 94760 N-INVAS EAR/PLS OXIMETRY 1: CPT

## 2023-12-04 PROCEDURE — 84100 ASSAY OF PHOSPHORUS: CPT | Performed by: INTERNAL MEDICINE

## 2023-12-04 PROCEDURE — 84145 PROCALCITONIN (PCT): CPT | Performed by: INTERNAL MEDICINE

## 2023-12-04 PROCEDURE — 99233 SBSQ HOSP IP/OBS HIGH 50: CPT | Performed by: NURSE PRACTITIONER

## 2023-12-04 PROCEDURE — 83735 ASSAY OF MAGNESIUM: CPT | Performed by: INTERNAL MEDICINE

## 2023-12-04 PROCEDURE — 94640 AIRWAY INHALATION TREATMENT: CPT

## 2023-12-04 PROCEDURE — 85025 COMPLETE CBC W/AUTO DIFF WBC: CPT | Performed by: INTERNAL MEDICINE

## 2023-12-04 PROCEDURE — 80048 BASIC METABOLIC PNL TOTAL CA: CPT | Performed by: INTERNAL MEDICINE

## 2023-12-04 RX ADMIN — ATORVASTATIN CALCIUM 80 MG: 80 TABLET, FILM COATED ORAL at 17:58

## 2023-12-04 RX ADMIN — LEVALBUTEROL HYDROCHLORIDE 1.25 MG: 1.25 SOLUTION RESPIRATORY (INHALATION) at 20:10

## 2023-12-04 RX ADMIN — EMPAGLIFLOZIN 10 MG: 10 TABLET, FILM COATED ORAL at 10:58

## 2023-12-04 RX ADMIN — GUAIFENESIN 600 MG: 600 TABLET ORAL at 10:58

## 2023-12-04 RX ADMIN — GUAIFENESIN 600 MG: 600 TABLET ORAL at 17:58

## 2023-12-04 RX ADMIN — FAMOTIDINE 20 MG: 20 TABLET ORAL at 22:44

## 2023-12-04 RX ADMIN — DEXAMETHASONE SODIUM PHOSPHATE 6 MG: 10 INJECTION, SOLUTION INTRAMUSCULAR; INTRAVENOUS at 10:59

## 2023-12-04 RX ADMIN — IPRATROPIUM BROMIDE 0.5 MG: 0.5 SOLUTION RESPIRATORY (INHALATION) at 20:10

## 2023-12-04 RX ADMIN — METOPROLOL SUCCINATE 12.5 MG: 25 TABLET, EXTENDED RELEASE ORAL at 20:00

## 2023-12-04 RX ADMIN — FUROSEMIDE 20 MG: 20 TABLET ORAL at 10:58

## 2023-12-04 RX ADMIN — HEPARIN SODIUM 5000 UNITS: 5000 INJECTION INTRAVENOUS; SUBCUTANEOUS at 22:44

## 2023-12-04 RX ADMIN — HEPARIN SODIUM 5000 UNITS: 5000 INJECTION INTRAVENOUS; SUBCUTANEOUS at 05:43

## 2023-12-04 RX ADMIN — LEVALBUTEROL HYDROCHLORIDE 1.25 MG: 1.25 SOLUTION RESPIRATORY (INHALATION) at 13:47

## 2023-12-04 RX ADMIN — FLUTICASONE PROPIONATE 1 SPRAY: 50 SPRAY, METERED NASAL at 10:58

## 2023-12-04 RX ADMIN — AZITHROMYCIN DIHYDRATE 500 MG: 250 TABLET ORAL at 17:58

## 2023-12-04 RX ADMIN — PANTOPRAZOLE SODIUM 40 MG: 40 TABLET, DELAYED RELEASE ORAL at 05:43

## 2023-12-04 RX ADMIN — IPRATROPIUM BROMIDE 0.5 MG: 0.5 SOLUTION RESPIRATORY (INHALATION) at 08:31

## 2023-12-04 RX ADMIN — CEFTRIAXONE 1000 MG: 1 INJECTION, SOLUTION INTRAVENOUS at 18:00

## 2023-12-04 RX ADMIN — BARICITINIB 4 MG: 2 TABLET, FILM COATED ORAL at 10:58

## 2023-12-04 RX ADMIN — IPRATROPIUM BROMIDE 0.5 MG: 0.5 SOLUTION RESPIRATORY (INHALATION) at 13:47

## 2023-12-04 RX ADMIN — LEVALBUTEROL HYDROCHLORIDE 1.25 MG: 1.25 SOLUTION RESPIRATORY (INHALATION) at 08:31

## 2023-12-04 RX ADMIN — REMDESIVIR 100 MG: 100 INJECTION, POWDER, LYOPHILIZED, FOR SOLUTION INTRAVENOUS at 10:58

## 2023-12-04 RX ADMIN — GABAPENTIN 300 MG: 300 CAPSULE ORAL at 22:44

## 2023-12-04 RX ADMIN — METOPROLOL SUCCINATE 12.5 MG: 25 TABLET, EXTENDED RELEASE ORAL at 10:58

## 2023-12-04 RX ADMIN — ASPIRIN 81 MG CHEWABLE TABLET 81 MG: 81 TABLET CHEWABLE at 10:58

## 2023-12-04 RX ADMIN — HEPARIN SODIUM 5000 UNITS: 5000 INJECTION INTRAVENOUS; SUBCUTANEOUS at 14:37

## 2023-12-04 NOTE — NURSING NOTE
AWAKE AND ALERT AND ORIENTED . ROUTINE RESP RX BEING GIVEN AND MICHELLE SAME. 02 SAT 98% PATIENT IS MAINTAINED ON 5 LITERS N/C FROM PREVIOUS SHIFT INCREASE. /MIN. DENIES PAIN. LOOSE NP COUGH. CALL MENDOZA NEAR. NO DISTRESS.  VERY [place] AND USES AN BRYAN ON HIS SMART PHONE FOR COMMUNICATION

## 2023-12-04 NOTE — RESPIRATORY THERAPY NOTE
12/03/23 8887   Respiratory Assessment   Resp Comments pt placed on on preset cpap machine with 4L   O2 Device cpap   Non-Invasive Information   O2 Interface Device Nasal mask   Non-Invasive Ventilation Mode CPAP   SpO2 94 %   $ Pulse Oximetry Spot Check Charge Completed   Non-Invasive Settings   FiO2 (%)   (with 4L)   PEEP/CPAP (cm H2O)   (preset)

## 2023-12-04 NOTE — PLAN OF CARE
Problem: Potential for Falls  Goal: Patient will remain free of falls  Description: INTERVENTIONS:  - Educate patient/family on patient safety including physical limitations  - Instruct patient to call for assistance with activity   - Consult OT/PT to assist with strengthening/mobility   - Keep Call bell within reach  - Keep bed low and locked with side rails adjusted as appropriate  - Keep care items and personal belongings within reach  - Initiate and maintain comfort rounds  - Make Fall Risk Sign visible to staff  - Offer Toileting in advance of need  - Initiate/Maintain bed alarm  - Obtain necessary fall risk management equipment  - Apply yellow socks and bracelet for high fall risk patients  - Consider moving patient to room near nurses station  Outcome: Progressing   Patient calls when in need of assistance.

## 2023-12-04 NOTE — PROGRESS NOTES
[address]  Progress Note  Name: Alberto Benitez  MRN: [id number]  Unit/Bed#: -01 I Date of Admission: 12/1/2023   Date of Service: 12/4/2023 I Hospital Day: 2    Assessment/Plan     Acute and chronic respiratory failure with hypoxia [facility])  Assessment & Plan  Present on admission as evidenced by patient requiring 5 L of nasal cannula supplemental O2  Patient requires 2-3 L of nasal cannula supplemental O2  Likely secondary to COPD exacerbation  Wean O2 as tolerated  Goal SpO2 greater than 88%  Respiratory protocol    * COPD with acute exacerbation [facility])  Assessment & Plan  Patient presents with shortness of breath and wheezing  Requiring 5 L of nasal cannula supplemental O2 up from baseline of 2-3 L  Likely COPD with acute exacerbation 2/2 COVID-19  IV corticosteroids as per COVID-19 plan  Atrovent and Xopenex  Respiratory and airway clearance protocols  Azithromycin 500 mg oral daily and ceftriaxone 1g IV daily, D4    COVID-19  Assessment & Plan  COVID-19 positive on 12/1/2023  Requiring 5 L NC supplemental O2  Moderate COVID-19 pathway  Continue Decadron  Continue Baricitinib, D3  Continue Remdesivir, D3  Wean O2 as tolerated    Chronic systolic congestive heart failure (HCC)  Assessment & Plan  Not in acute exacerbation  Continue home beta-blocker and Entresto  Continue Jardiance  Outpatient follow-up with cardiology    LAMBERTO on CPAP  Assessment & Plan  CPAP at night    GERD (gastroesophageal reflux disease)  Assessment & Plan  Continue home PPI and H2 blocker    Hyperlipidemia  Assessment & Plan  Continue home statin therapy           VTE Pharmacologic Prophylaxis: VTE Score: 6 High Risk (Score >/= 5) - Pharmacological DVT Prophylaxis Ordered: heparin. Sequential Compression Devices Ordered. Mobility:   Basic Mobility Inpatient Raw Score: 22  JH-HLM Goal: 7: Walk 25 feet or more  JH-HLM Achieved: 7: Walk 25 feet or more  HLM Goal achieved.  Continue to encourage appropriate mobility. Patient Centered Rounds: I performed bedside rounds with nursing staff today. Discussions with Specialists or Other Care Team Provider: FLY    Education and Discussions with Family / Patient: Will call patient's daughter Jean Irvin after rounds. Total Time Spent on Date of Encounter in care of patient: 55 mins. This time was spent on one or more of the following: performing physical exam; counseling and coordination of care; obtaining or reviewing history; documenting in the medical record; reviewing/ordering tests, medications or procedures; communicating with other healthcare professionals and discussing with patient's family/caregivers. Current Length of Stay: 2 day(s)  Current Patient Status: Inpatient   Certification Statement: The patient will continue to require additional inpatient hospital stay due to acute respiratory failure, COPD exacerbation, COVID infection. Discharge Plan: Anticipate discharge in 48 hrs to TBD. Code Status: Level 3 - DNAR and DNI    Subjective:   Patient seen and examined. He has no complaints offered. Objective:     Vitals:   Temp (24hrs), Av.9 °F (36.1 °C), Min:96 °F (35.6 °C), Max:97.5 °F (36.4 °C)    Temp:  [96 °F (35.6 °C)-97.5 °F (36.4 °C)] 97.3 °F (36.3 °C)  HR:  [100-108] 108  Resp:  [16-20] 16  BP: (112-121)/(72-82) 113/77  SpO2:  [90 %-98 %] 94 %  Body mass index is 25.98 kg/m². Input and Output Summary (last 24 hours): Intake/Output Summary (Last 24 hours) at 2023 1629  Last data filed at 12/3/2023 1839  Gross per 24 hour   Intake 300 ml   Output 700 ml   Net -400 ml       Physical Exam:   Physical Exam  Vitals and nursing note reviewed. Constitutional:       Appearance: He is ill-appearing. HENT:      Head: Normocephalic and atraumatic. Ears:      Comments: He has decreased hearing. We communicated at his suggestion using the voice to text on his phone.   He is hopeful that when his EF goes above 45, he can have a cochlear implant. Nose: Nose normal.      Mouth/Throat:      Mouth: Mucous membranes are moist.      Pharynx: Oropharynx is clear. Eyes:      Pupils: Pupils are equal, round, and reactive to light. Cardiovascular:      Rate and Rhythm: Normal rate and regular rhythm. Pulmonary:      Effort: Pulmonary effort is normal. No respiratory distress. Breath sounds: Wheezing present. Abdominal:      General: Bowel sounds are normal.      Palpations: Abdomen is soft. Tenderness: There is no abdominal tenderness. Musculoskeletal:      Cervical back: Neck supple. Right lower leg: No edema. Left lower leg: No edema. Skin:     General: Skin is warm and dry. Capillary Refill: Capillary refill takes less than 2 seconds. Neurological:      General: No focal deficit present. Mental Status: He is alert and oriented to person, place, and time. Additional Data:     Labs:  Results from last 7 days   Lab Units 12/04/23  0517   WBC Thousand/uL 11.73*   HEMOGLOBIN g/dL 12.5   HEMATOCRIT % 40.7   PLATELETS Thousands/uL 267   NEUTROS PCT % 83*   LYMPHS PCT % 9*   MONOS PCT % 7   EOS PCT % 0     Results from last 7 days   Lab Units 12/04/23  0517 12/02/23  0450 12/01/23  1452   SODIUM mmol/L 141   < > 142   POTASSIUM mmol/L 4.3   < > 3.2*   CHLORIDE mmol/L 102   < > 100   CO2 mmol/L 31   < > 36*   BUN mg/dL 29*   < > 19   CREATININE mg/dL 1.09   < > 1.07   ANION GAP mmol/L 8   < > 6   CALCIUM mg/dL 8.3*   < > 8.7   ALBUMIN g/dL  --   --  3.6   TOTAL BILIRUBIN mg/dL  --   --  0.66   ALK PHOS U/L  --   --  53   ALT U/L  --   --  23   AST U/L  --   --  25   GLUCOSE RANDOM mg/dL 110   < > 161*    < > = values in this interval not displayed.      Results from last 7 days   Lab Units 12/01/23  1452   INR  0.97             Results from last 7 days   Lab Units 12/04/23  0517 12/02/23  0450 12/01/23  1801 12/01/23  1452   LACTIC ACID mmol/L  --   --  1.4 2.8*   PROCALCITONIN ng/ml 0.07 0.08  -- --        Lines/Drains:  Invasive Devices       Peripheral Intravenous Line  Duration             Peripheral IV 12/01/23 Right Antecubital 3 days                          Imaging: Reviewed radiology reports from this admission including: chest xray and chest CT scan    Recent Cultures (last 7 days):   Results from last 7 days   Lab Units 12/01/23  1515 12/01/23  1452   BLOOD CULTURE  No Growth at 48 hrs. No Growth at 48 hrs.        Last 24 Hours Medication List:   Current Facility-Administered Medications   Medication Dose Route Frequency Provider Last Rate    acetaminophen  650 mg Oral Q6H PRN Kelly Moons, DO      albuterol  2 puff Inhalation Q4H PRN Kelly Moons, DO      ALPRAZolam  0.25 mg Oral HS PRN Kelly Moons, DO      aspirin  81 mg Oral Daily Kelly Moons, DO      atorvastatin  80 mg Oral Daily With [employer], DO      azithromycin  500 mg Oral Q24H Kelly Moons, DO      baricitinib  4 mg Oral Q24H Kelly Moons, DO      cefTRIAXone  1,000 mg Intravenous Q24H Kelly Moons, DO 1,000 mg (12/03/23 1834)    dexamethasone  6 mg Intravenous Q24H Kelly Moons, DO      Empagliflozin  10 mg Oral QAM Kelly Moons, DO      famotidine  20 mg Oral HS Kelly Moons, DO      fluticasone  1 spray Each Nare Daily Kelly Moons, DO      furosemide  20 mg Oral Daily Kelly Moons, DO      gabapentin  300 mg Oral HS Kelly Moons, DO      guaiFENesin  600 mg Oral BID Kelly Moons, DO      guaiFENesin  200 mg Oral Q4H PRN Ana Snell PA-C      heparin (porcine)  5,000 Units Subcutaneous Q8H [facility] Kelly Moons, DO      ipratropium  0.5 mg Nebulization TID Kelly Moons, DO      levalbuterol  1.25 mg Nebulization TID Kelly Moons, DO      methocarbamol  500 mg Oral TID PRN Kelly Moons, DO      metoprolol succinate  12.5 mg Oral BID Kelly Moons, DO      pantoprazole  40 mg Oral Early Morning Kelly Moons, DO      remdesivir  100 mg Intravenous Q24H Brandon Innocent, DO      sodium chloride  1 spray Each Nare Q1H PRN Rich Sanchez PA-C          Today, Patient Was Seen By: INOCENTE Medel    **Please Note: This note may have been constructed using a voice recognition system. **

## 2023-12-04 NOTE — PLAN OF CARE
Problem: Potential for Falls  Goal: Patient will remain free of falls  Description: INTERVENTIONS:  - Educate patient/family on patient safety including physical limitations  - Instruct patient to call for assistance with activity   - Consult OT/PT to assist with strengthening/mobility   - Keep Call bell within reach  - Keep bed low and locked with side rails adjusted as appropriate  - Keep care items and personal belongings within reach  - Initiate and maintain comfort rounds  - Make Fall Risk Sign visible to staff  - Offer Toileting in advance of need  - Initiate/Maintain bed alarm  - Obtain necessary fall risk management equipment  - Apply yellow socks and bracelet for high fall risk patients  - Consider moving patient to room near nurses station  Outcome: Progressing     Problem: INFECTION - ADULT  Goal: Absence or prevention of progression during hospitalization  Description: INTERVENTIONS:  - Assess and monitor for signs and symptoms of infection  - Monitor lab/diagnostic results  - Monitor all insertion sites, i.e. indwelling lines, tubes, and drains  - Monitor endotracheal if appropriate and nasal secretions for changes in amount and color  - Bangs appropriate cooling/warming therapies per order  - Administer medications as ordered  - Instruct and encourage patient and family to use good hand hygiene technique  - Identify and instruct in appropriate isolation precautions for identified infection/condition  Outcome: Progressing     Problem: DISCHARGE PLANNING  Goal: Discharge to home or other facility with appropriate resources  Description: INTERVENTIONS:  - Identify barriers to discharge w/patient and caregiver  - Arrange for needed discharge resources and transportation as appropriate  - Identify discharge learning needs (meds, wound care, etc.)  - Refer to Case Management Department for coordinating discharge planning if the patient needs post-hospital services based on physician/advanced practitioner order or complex needs related to functional status, cognitive ability, or social support system  Outcome: Progressing     Problem: Knowledge Deficit  Goal: Patient/family/caregiver demonstrates understanding of disease process, treatment plan, medications, and discharge instructions  Description: Complete learning assessment and assess knowledge base.   Interventions:  - Provide teaching at level of understanding  - Provide teaching via preferred learning methods  Outcome: Progressing     Problem: RESPIRATORY - ADULT  Goal: Achieves optimal ventilation and oxygenation  Description: INTERVENTIONS:  - Assess for changes in respiratory status  - Assess for changes in mentation and behavior  - Position to facilitate oxygenation and minimize respiratory effort  - Oxygen administered by appropriate delivery if ordered  - Initiate smoking cessation education as indicated  - Encourage broncho-pulmonary hygiene including cough, deep breathe, Incentive Spirometry  - Assess the need for suctioning and aspirate as needed  - Assess and instruct to report SOB or any respiratory difficulty  - Respiratory Therapy support as indicated  Outcome: Progressing

## 2023-12-05 ENCOUNTER — PATIENT OUTREACH (OUTPATIENT)
Dept: CASE MANAGEMENT | Facility: OTHER | Age: 78
End: 2023-12-05

## 2023-12-05 ENCOUNTER — TELEPHONE (OUTPATIENT)
Dept: NON INVASIVE DIAGNOSTICS | Facility: HOSPITAL | Age: 78
End: 2023-12-05

## 2023-12-05 LAB
ALBUMIN SERPL BCP-MCNC: 3.4 G/DL (ref 3.5–5)
ALP SERPL-CCNC: 52 U/L (ref 34–104)
ALT SERPL W P-5'-P-CCNC: 58 U/L (ref 7–52)
ANION GAP SERPL CALCULATED.3IONS-SCNC: 7 MMOL/L
AST SERPL W P-5'-P-CCNC: 34 U/L (ref 13–39)
BASOPHILS # BLD AUTO: 0.03 THOUSANDS/ÂΜL (ref 0–0.1)
BASOPHILS NFR BLD AUTO: 0 % (ref 0–1)
BILIRUB SERPL-MCNC: 0.6 MG/DL (ref 0.2–1)
BUN SERPL-MCNC: 31 MG/DL (ref 5–25)
CALCIUM ALBUM COR SERPL-MCNC: 8.6 MG/DL (ref 8.3–10.1)
CALCIUM SERPL-MCNC: 8.1 MG/DL (ref 8.4–10.2)
CHLORIDE SERPL-SCNC: 100 MMOL/L (ref 96–108)
CO2 SERPL-SCNC: 33 MMOL/L (ref 21–32)
CREAT SERPL-MCNC: 1.15 MG/DL (ref 0.6–1.3)
EOSINOPHIL # BLD AUTO: 0 THOUSAND/ÂΜL (ref 0–0.61)
EOSINOPHIL NFR BLD AUTO: 0 % (ref 0–6)
ERYTHROCYTE [DISTWIDTH] IN BLOOD BY AUTOMATED COUNT: 16.3 % (ref 11.6–15.1)
GFR SERPL CREATININE-BSD FRML MDRD: 60 ML/MIN/1.73SQ M
GLUCOSE SERPL-MCNC: 103 MG/DL (ref 65–140)
HCT VFR BLD AUTO: 42.4 % (ref 36.5–49.3)
HGB BLD-MCNC: 13 G/DL (ref 12–17)
IMM GRANULOCYTES # BLD AUTO: 0.26 THOUSAND/UL (ref 0–0.2)
IMM GRANULOCYTES NFR BLD AUTO: 2 % (ref 0–2)
LYMPHOCYTES # BLD AUTO: 0.81 THOUSANDS/ÂΜL (ref 0.6–4.47)
LYMPHOCYTES NFR BLD AUTO: 5 % (ref 14–44)
MCH RBC QN AUTO: 30.9 PG (ref 26.8–34.3)
MCHC RBC AUTO-ENTMCNC: 30.7 G/DL (ref 31.4–37.4)
MCV RBC AUTO: 101 FL (ref 82–98)
MONOCYTES # BLD AUTO: 1.54 THOUSAND/ÂΜL (ref 0.17–1.22)
MONOCYTES NFR BLD AUTO: 10 % (ref 4–12)
NEUTROPHILS # BLD AUTO: 13.41 THOUSANDS/ÂΜL (ref 1.85–7.62)
NEUTS SEG NFR BLD AUTO: 83 % (ref 43–75)
NRBC BLD AUTO-RTO: 0 /100 WBCS
PLATELET # BLD AUTO: 288 THOUSANDS/UL (ref 149–390)
PMV BLD AUTO: 11.3 FL (ref 8.9–12.7)
POTASSIUM SERPL-SCNC: 4 MMOL/L (ref 3.5–5.3)
PROT SERPL-MCNC: 5.3 G/DL (ref 6.4–8.4)
RBC # BLD AUTO: 4.21 MILLION/UL (ref 3.88–5.62)
SODIUM SERPL-SCNC: 140 MMOL/L (ref 135–147)
WBC # BLD AUTO: 16.05 THOUSAND/UL (ref 4.31–10.16)

## 2023-12-05 PROCEDURE — 94664 DEMO&/EVAL PT USE INHALER: CPT

## 2023-12-05 PROCEDURE — 94760 N-INVAS EAR/PLS OXIMETRY 1: CPT

## 2023-12-05 PROCEDURE — 85025 COMPLETE CBC W/AUTO DIFF WBC: CPT | Performed by: NURSE PRACTITIONER

## 2023-12-05 PROCEDURE — 99233 SBSQ HOSP IP/OBS HIGH 50: CPT | Performed by: NURSE PRACTITIONER

## 2023-12-05 PROCEDURE — 94640 AIRWAY INHALATION TREATMENT: CPT

## 2023-12-05 PROCEDURE — 80053 COMPREHEN METABOLIC PANEL: CPT | Performed by: NURSE PRACTITIONER

## 2023-12-05 RX ADMIN — IPRATROPIUM BROMIDE 0.5 MG: 0.5 SOLUTION RESPIRATORY (INHALATION) at 08:52

## 2023-12-05 RX ADMIN — LEVALBUTEROL HYDROCHLORIDE 1.25 MG: 1.25 SOLUTION RESPIRATORY (INHALATION) at 14:21

## 2023-12-05 RX ADMIN — HEPARIN SODIUM 5000 UNITS: 5000 INJECTION INTRAVENOUS; SUBCUTANEOUS at 15:00

## 2023-12-05 RX ADMIN — ATORVASTATIN CALCIUM 80 MG: 80 TABLET, FILM COATED ORAL at 18:12

## 2023-12-05 RX ADMIN — BARICITINIB 4 MG: 2 TABLET, FILM COATED ORAL at 11:15

## 2023-12-05 RX ADMIN — DEXAMETHASONE SODIUM PHOSPHATE 6 MG: 10 INJECTION, SOLUTION INTRAMUSCULAR; INTRAVENOUS at 11:13

## 2023-12-05 RX ADMIN — FAMOTIDINE 20 MG: 20 TABLET ORAL at 22:33

## 2023-12-05 RX ADMIN — IPRATROPIUM BROMIDE 0.5 MG: 0.5 SOLUTION RESPIRATORY (INHALATION) at 21:43

## 2023-12-05 RX ADMIN — FLUTICASONE PROPIONATE 1 SPRAY: 50 SPRAY, METERED NASAL at 08:15

## 2023-12-05 RX ADMIN — GUAIFENESIN 600 MG: 600 TABLET ORAL at 08:13

## 2023-12-05 RX ADMIN — HEPARIN SODIUM 5000 UNITS: 5000 INJECTION INTRAVENOUS; SUBCUTANEOUS at 22:34

## 2023-12-05 RX ADMIN — LEVALBUTEROL HYDROCHLORIDE 1.25 MG: 1.25 SOLUTION RESPIRATORY (INHALATION) at 21:43

## 2023-12-05 RX ADMIN — GABAPENTIN 300 MG: 300 CAPSULE ORAL at 22:34

## 2023-12-05 RX ADMIN — IPRATROPIUM BROMIDE 0.5 MG: 0.5 SOLUTION RESPIRATORY (INHALATION) at 14:21

## 2023-12-05 RX ADMIN — FUROSEMIDE 20 MG: 20 TABLET ORAL at 08:13

## 2023-12-05 RX ADMIN — CEFTRIAXONE 1000 MG: 1 INJECTION, SOLUTION INTRAVENOUS at 18:19

## 2023-12-05 RX ADMIN — LEVALBUTEROL HYDROCHLORIDE 1.25 MG: 1.25 SOLUTION RESPIRATORY (INHALATION) at 08:52

## 2023-12-05 RX ADMIN — PANTOPRAZOLE SODIUM 40 MG: 40 TABLET, DELAYED RELEASE ORAL at 06:14

## 2023-12-05 RX ADMIN — GUAIFENESIN 600 MG: 600 TABLET ORAL at 18:12

## 2023-12-05 RX ADMIN — EMPAGLIFLOZIN 10 MG: 10 TABLET, FILM COATED ORAL at 08:13

## 2023-12-05 RX ADMIN — ASPIRIN 81 MG CHEWABLE TABLET 81 MG: 81 TABLET CHEWABLE at 08:13

## 2023-12-05 RX ADMIN — REMDESIVIR 100 MG: 100 INJECTION, POWDER, LYOPHILIZED, FOR SOLUTION INTRAVENOUS at 11:14

## 2023-12-05 RX ADMIN — METOPROLOL SUCCINATE 12.5 MG: 25 TABLET, EXTENDED RELEASE ORAL at 08:13

## 2023-12-05 RX ADMIN — HEPARIN SODIUM 5000 UNITS: 5000 INJECTION INTRAVENOUS; SUBCUTANEOUS at 06:13

## 2023-12-05 NOTE — ASSESSMENT & PLAN NOTE
COVID-19 positive on 12/1/2023  Requiring 5 L NC supplemental O2  Moderate COVID-19 pathway  Continue Decadron  Continue Baricitinib, D4  Continue Remdesivir, D4  Wean O2 as tolerated

## 2023-12-05 NOTE — PLAN OF CARE
Problem: Potential for Falls  Goal: Patient will remain free of falls  Description: INTERVENTIONS:  - Educate patient/family on patient safety including physical limitations  - Instruct patient to call for assistance with activity   - Consult OT/PT to assist with strengthening/mobility   - Keep Call bell within reach  - Keep bed low and locked with side rails adjusted as appropriate  - Keep care items and personal belongings within reach  - Initiate and maintain comfort rounds  - Make Fall Risk Sign visible to staff  - Offer Toileting in advance of need  - Initiate/Maintain bed alarm  - Obtain necessary fall risk management equipment  - Apply yellow socks and bracelet for high fall risk patients  - Consider moving patient to room near nurses station  Outcome: Progressing     Problem: Knowledge Deficit  Goal: Patient/family/caregiver demonstrates understanding of disease process, treatment plan, medications, and discharge instructions  Description: Complete learning assessment and assess knowledge base.   Interventions:  - Provide teaching at level of understanding  - Provide teaching via preferred learning methods  Outcome: Progressing

## 2023-12-05 NOTE — PROGRESS NOTES
OP RT CM received in basket ADT alert. Patient is currently hospitalized. I will outreach once discharged home.

## 2023-12-05 NOTE — CASE MANAGEMENT
Case Management Assessment & Discharge Planning Note    Patient name Berny Gonzalez  Location /-96 MRN [id number]  : 1945 Date 2023       Current Admission Date: 2023  Current Admission Diagnosis:COPD with acute exacerbation [facility])   Patient Active Problem List    Diagnosis Date Noted    Chronic systolic congestive heart failure ([address]) 10/22/2023    Erectile dysfunction 2023    Other disorders of refraction 2023    Occlusion and stenosis of unspecified carotid artery 2023    Dysphagia 2023    Congestive heart failure with left ventricular systolic dysfunction (LVSD) ([address]) 2023    Hypoxemia 2023    Intracranial aneurysm 2023    Allergic rhinitis, unspecified 2023    Chronic kidney disease, stage 3a ([address]) 2023    Generalized muscle weakness 2023    Hypertensive heart and chronic kidney disease with heart failure and stage 1 through stage 4 chronic kidney disease, or unspecified chronic kidney disease ([address]) 2023    Need for assistance with personal care 2023    Other abnormalities of gait and mobility 2023    Retention of urine, unspecified 2023    Sudden idiopathic hearing loss, right ear 2023    Acute and chronic respiratory failure with hypoxia ([address]) 2023    Elevated troponin 2023    Orthopnea 2023    Lower extremity edema 2023    Irregular heart rhythm 2023    Pneumonia of right lower lobe due to infectious organism 03/15/2023    Leukocytosis 2023    Left leg pain 2023    Transient right leg weakness 2023    Hypokalemia 2023    COVID-19 2023    Ambulatory dysfunction 2023    COPD (chronic obstructive pulmonary disease) ([address]) 2023    Sensorineural hearing loss, bilateral 10/19/2022    Chronic respiratory failure with hypoxia ([address]) 10/15/2022    Prostate cancer ([address]) 2022    Elevated MCV 2022    Cubital tunnel syndrome on left 07/22/2022    Carpal tunnel syndrome on left 07/22/2022    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    Urinary frequency 04/27/2022    Incomplete bladder emptying 04/27/2022    Chronic bilateral low back pain with right-sided sciatica 04/18/2022    Mid back pain 04/18/2022    Thoracic radiculopathy 04/18/2022    Chronic pain syndrome 04/18/2022    Hoarseness or changing voice 04/14/2022    Chronic right-sided thoracic back pain 03/05/2022    Primary insomnia 03/02/2022    Right hip pain 01/20/2022    Abnormal nuclear stress test 03/18/2021    Costochondral chest pain 01/15/2021    COPD with acute exacerbation ([address]) 01/11/2021    Oxygen dependent 01/11/2021    S/P carotid endarterectomy 01/11/2021    Stented coronary artery 01/11/2021    Vertigo 01/11/2021    Anisometropia 01/11/2021    Osteoarthritis of spinal facet joint 01/11/2021    Chronic ischemic heart disease 01/11/2021    Diverticular disease of colon 01/11/2021    Dry eye syndrome 01/11/2021    GERD (gastroesophageal reflux disease) 01/11/2021    Acute hearing loss, right 01/11/2021    Elevated PSA 01/11/2021    Hypoxia 01/11/2021    Lens replaced by other means 01/11/2021    Lumbar radiculopathy 01/11/2021    Multinodular goiter 01/11/2021    Nuclear senile cataract 01/11/2021    Obesity 01/11/2021    Occlusion and stenosis of carotid artery 01/11/2021    Osteoarthritis of hip 01/11/2021    Prediabetes 01/11/2021    LAMBERTO on CPAP 01/11/2021    Solitary pulmonary nodule 01/11/2021    Thyroid nodule 01/11/2021    Guyon syndrome, 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  [address] [facility] [address]  Phone: [phone] Fax: [phone] [address] [facility], [address] [facility]  [address] [facility] [address]  Phone: [phone] Fax: [phone]    Primary Care Provider: Festus Ferraro DO    Primary Insurance: [facility]  Secondary Insurance:     ASSESSMENT:  [address], [address] Representative - Spouse   Primary Phone: [phone] (Mobile)                 Advance Directives  Does patient have a [address]?: Yes (family needs to bring in paperwork)  Does patient have Advance Directives?: Yes (family needs to bring in paperwork)         Readmission Root Cause  30 Day Readmission: No    Patient Information  Admitted from[de-identified] Home  Mental Status: Alert  During Assessment patient was accompanied by: Not accompanied during assessment  Assessment information provided by[de-identified] Spouse  Primary Caregiver: Family  Caregiver's Name[de-identified] spouse & daughter  Caregiver's Relationship to Patient[de-identified] Family Member  Caregiver's Telephone Number[de-identified] wife  [phone]  Support Systems: Spouse/significant other, Daughter  [facility]: [facility] do you live in?: [address] [facility] entry access options.  Select all that apply.: Stairs  Number of steps to enter home.: 2  Do the steps have railings?: Yes  Type of Current Residence: [address] [place] [address] home  Upon entering residence, is there a bedroom on the main floor (no further steps)?: Yes  Upon entering residence, is there a bathroom on the main floor (no further steps)?: Yes  Living Arrangements: Lives w/ Spouse/significant other, Lives w/ Daughter  Is patient a ?: Yes ([employer]  - pt has some benefits)  Is patient active with [facility])?: Yes (pt received some befits and does go to the [facility])  Is patient service connected?: No    Activities of Daily Living Prior to Admission  Functional Status: Assistance (driving, meals, shopping , laundry)  Completes ADLs independently?: Yes  Ambulates independently?: Yes  Does patient use assisted devices?: Yes  Assisted Devices (DME) used: Tristin Kiran, Other (Comment), Home Oxygen concentrator, CPAP, Portable Oxygen concentrator, Nebulizer (scooter)  DME Company Name (respiratory supplies): [facility]  O2 Rate(s): 3 liters  Does patient currently own DME?: Yes  What DME does the patient currently own?: CPAP, Quad Cane, Walker, Home Oxygen concentrator, Portable Oxygen tanks, Portable Oxygen concentrator, Nebulizer, Bedside Commode, Shower Chair, Other (Comment) (scooter, pulse ox, bp cuff)  Does patient have a history of Outpatient Therapy (PT/OT)?: Yes ([address] [place] [address] Pulmonary)  Does the patient have a history of Short[facility]Term Rehab?: [facility]  Does patient have a history of HHC?: Yes (family unsure of the agency)  Does patient currently have [address]?: No         Patient Information Continued  Income Source: Pension/jail  Does patient have prescription coverage?: Yes ([employer])  Does patient receive dialysis treatments?: No  Does patient have a history of Mental Health Diagnosis?: No         Means of Transportation  Means of Transport to Appts[de-identified] Family transport (pt does drive short distances)      Housing Stability: Low Risk  (10/19/2023)    Housing Stability Vital Sign     Unable to Pay for Housing in the Last Year: No     Number of [address] in the Last Year: 1     Unstable Housing in the Last Year: No   Food Insecurity: No Food Insecurity (10/19/2023)    Hunger Vital Sign     Worried About Running Out of Food in the Last Year: Never true     Ran Out of Food in the Last Year: Never true   Transportation Needs: No Transportation Needs (10/19/2023)    PRAPARE - Transportation     Lack of Transportation (Medical): No     Lack of Transportation (Non-Medical):  No   Utilities: Not on file       DISCHARGE DETAILS:    Discharge planning discussed with[de-identified] patient was sleeping- cm called his wife a t15:26pm  Freedom of Choice: Yes  Comments - Freedom of Choice: pt not stable for d/c - cm will continue to follow  CM contacted family/caregiver?: Yes             Contacts  Patient Contacts: Dominick Limb  Relationship to Patient[de-identified] Family (wife)  Contact Method: Phone  Phone Number: [phone]  Reason/Outcome: Discharge Planning         DME Referral Provided  Referral made for DME?: No    Other Referral/Resources/Interventions Provided:  Referral Comments: d/c plan tbd         Treatment Team Recommendation:  (d/c plan tbd- family)

## 2023-12-05 NOTE — ASSESSMENT & PLAN NOTE
Patient presents with shortness of breath and wheezing  Requiring 5 L of nasal cannula supplemental O2 up from baseline of 2-3 L  Likely COPD with acute exacerbation 2/2 COVID-19  IV corticosteroids as per COVID-19 plan  Atrovent and Xopenex  Respiratory and airway clearance protocols  S/p Azithromycin 500 mg oral x 3 days  Continue ceftriaxone 1g IV daily, D5

## 2023-12-05 NOTE — RESPIRATORY THERAPY NOTE
12/04/23 2224   Respiratory Assessment   Assessment Type Assess only   General Appearance Awake;Drowsy   Respiratory Pattern Normal   Chest Assessment Chest expansion symmetrical   Bilateral Breath Sounds Diminished;Coarse   Resp Comments pt ready and self applies own cpap unit therapist added o2 @ 3 l/m to unit rn aware   O2 Device cpap w / 3 l/m to unit   Non-Invasive Information   SpO2 98 %   $ Pulse Oximetry Spot Check Charge Completed

## 2023-12-05 NOTE — TELEPHONE ENCOUNTER
Received message from patient's daughter to contact her with some questions she was having. Patient's daughter was wondering about transitioning patient in terms of potential medical cannabis use. I informed her that from cardiac standpoint would defer given LV dysfunction however if we were taking more of a comfort based approach then I would defer this medical therapy and risks and benefits to palliative care team.  Patient's daughter noted understanding we will be in contact with palliative care team to discuss potential for cannabinoid use (CBD edibles) if patient were agreeable.

## 2023-12-05 NOTE — CASE MANAGEMENT
Case Management Discharge Planning Note    Patient name Jyotsna Mederos  Location /-59 MRN [id number]  : 1945 Date 2023       Current Admission Date: 2023  Current Admission Diagnosis:COPD with acute exacerbation [facility])   Patient Active Problem List    Diagnosis Date Noted    Chronic systolic congestive heart failure ([address]) 10/22/2023    Erectile dysfunction 2023    Other disorders of refraction 2023    Occlusion and stenosis of unspecified carotid artery 2023    Dysphagia 2023    Congestive heart failure with left ventricular systolic dysfunction (LVSD) ([address]) 2023    Hypoxemia 2023    Intracranial aneurysm 2023    Allergic rhinitis, unspecified 2023    Chronic kidney disease, stage 3a ([address]) 2023    Generalized muscle weakness 2023    Hypertensive heart and chronic kidney disease with heart failure and stage 1 through stage 4 chronic kidney disease, or unspecified chronic kidney disease ([address]) 2023    Need for assistance with personal care 2023    Other abnormalities of gait and mobility 2023    Retention of urine, unspecified 2023    Sudden idiopathic hearing loss, right ear 2023    Acute and chronic respiratory failure with hypoxia ([address]) 2023    Elevated troponin 2023    Orthopnea 2023    Lower extremity edema 2023    Irregular heart rhythm 2023    Pneumonia of right lower lobe due to infectious organism 03/15/2023    Leukocytosis 2023    Left leg pain 2023    Transient right leg weakness 2023    Hypokalemia 2023    COVID-19 2023    Ambulatory dysfunction 2023    COPD (chronic obstructive pulmonary disease) ([address]) 2023    Sensorineural hearing loss, bilateral 10/19/2022    Chronic respiratory failure with hypoxia ([address]) 10/15/2022    Prostate cancer ([address]) 2022    Elevated MCV 2022    Cubital tunnel syndrome on left 07/22/2022    Carpal tunnel syndrome on left 07/22/2022    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    Urinary frequency 04/27/2022    Incomplete bladder emptying 04/27/2022    Chronic bilateral low back pain with right-sided sciatica 04/18/2022    Mid back pain 04/18/2022    Thoracic radiculopathy 04/18/2022    Chronic pain syndrome 04/18/2022    Hoarseness or changing voice 04/14/2022    Chronic right-sided thoracic back pain 03/05/2022    Primary insomnia 03/02/2022    Right hip pain 01/20/2022    Abnormal nuclear stress test 03/18/2021    Costochondral chest pain 01/15/2021    COPD with acute exacerbation ([address]) 01/11/2021    Oxygen dependent 01/11/2021    S/P carotid endarterectomy 01/11/2021    Stented coronary artery 01/11/2021    Vertigo 01/11/2021    Anisometropia 01/11/2021    Osteoarthritis of spinal facet joint 01/11/2021    Chronic ischemic heart disease 01/11/2021    Diverticular disease of colon 01/11/2021    Dry eye syndrome 01/11/2021    GERD (gastroesophageal reflux disease) 01/11/2021    Acute hearing loss, right 01/11/2021    Elevated PSA 01/11/2021    Hypoxia 01/11/2021    Lens replaced by other means 01/11/2021    Lumbar radiculopathy 01/11/2021    Multinodular goiter 01/11/2021    Nuclear senile cataract 01/11/2021    Obesity 01/11/2021    Occlusion and stenosis of carotid artery 01/11/2021    Osteoarthritis of hip 01/11/2021    Prediabetes 01/11/2021    LAMBERTO on CPAP 01/11/2021    Solitary pulmonary nodule 01/11/2021    Thyroid nodule 01/11/2021    [place]on syndrome, 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    Depression, recurrent ([address])     Hyperlipidemia     Coronary artery disease involving native coronary artery of native heart without angina pectoris     Left carotid artery occlusion       LOS (days): 3  Geometric Mean LOS (GMLOS) (days): 5.00  Days to GMLOS:1.9     OBJECTIVE:  Risk of Unplanned Readmission Score: 53.06         Current admission status: Inpatient   Preferred Pharmacy:   [facility] [address], [address] [place].  [address]  [address] [facility].  [place]. Sera FANG [address]  Phone: [phone] Fax: [phone] [facility], [address] [facility]  [address] [facility] [address]  Phone: [phone] Fax: [phone]    Primary Care Provider: Joseph Roberts DO    Primary Insurance: [facility]  Secondary Insurance:     DISCHARGE DETAILS:    Discharge planning discussed with[de-identified] cm called  Toney Kaye at 9:49 am LM and Amanda Ellison was called  at 9:47 am LM and she called cm back at 10:14 am  Freedom of Choice: Yes  Comments - Freedom of Choice: daughter request hhc  - she would like [facility] - referrals sent  CM contacted family/caregiver?: Yes             Contacts  Patient Contacts: Daja Flores  Relationship to Patient[de-identified] Family (daughter)  Contact Method: Phone  Phone Number: [phone]  Reason/Outcome: Discharge [facility]         Is the patient interested in [address] at discharge?: Yes  [facility] requested[de-identified] [facility] Name[de-identified] Other    DME Referral Provided  Referral made for DME?: No    Other Referral/Resources/Interventions Provided:  Interventions: HHC  Referral Comments: referral sent for c    Would you like to participate in our [facility] service program?  : No - Declined    Treatment Team Recommendation:  (home with family & hhc & outp follow up- family)                                   IMM Given (Date):: 12/05/23  IMM Given to[de-identified] Family (daughter - reviewed over the phone- she stated she understood & copy of IMM was mailed)

## 2023-12-05 NOTE — PROGRESS NOTES
[address]  Progress Note  Name: Pérez Good  MRN: [id number]  Unit/Bed#: -01 I Date of Admission: 12/1/2023   Date of Service: 12/5/2023 I Hospital Day: 3    Assessment/Plan     Acute and chronic respiratory failure with hypoxia [facility])  Assessment & Plan  Present on admission as evidenced by patient requiring 5 L of nasal cannula supplemental O2  Patient requires 2-3 L of nasal cannula supplemental O2  Likely secondary to COPD exacerbation  Wean O2 as tolerated  Goal SpO2 greater than 88%  Respiratory protocol    * COPD with acute exacerbation ([address])  Assessment & Plan  Patient presents with shortness of breath and wheezing  Requiring 5 L of nasal cannula supplemental O2 up from baseline of 2-3 L  Likely COPD with acute exacerbation 2/2 COVID-19  IV corticosteroids as per COVID-19 plan  Atrovent and Xopenex  Respiratory and airway clearance protocols  S/p Azithromycin 500 mg oral x 3 days  Continue ceftriaxone 1g IV daily, D5    COVID-19  Assessment & Plan  COVID-19 positive on 12/1/2023  Requiring 5 L NC supplemental O2  Moderate COVID-19 pathway  Continue Decadron  Continue Baricitinib, D4  Continue Remdesivir, D4  Wean O2 as tolerated    Chronic systolic congestive heart failure (HCC)  Assessment & Plan  Not in acute exacerbation  Continue home beta-blocker and Entresto  Continue Jardiance  Outpatient follow-up with cardiology    LAMBERTO on CPAP  Assessment & Plan  CPAP at night    GERD (gastroesophageal reflux disease)  Assessment & Plan  Continue home PPI and H2 blocker    Hyperlipidemia  Assessment & Plan  Continue home statin therapy           VTE Pharmacologic Prophylaxis: VTE Score: 6 High Risk (Score >/= 5) - Pharmacological DVT Prophylaxis Ordered: heparin. Sequential Compression Devices Ordered. Mobility:   Basic Mobility Inpatient Raw Score: 22  JH-HLM Goal: 7: Walk 25 feet or more  JH-HLM Achieved: 7: Walk 25 feet or more  HLM Goal achieved.  Continue to encourage appropriate mobility. Patient Centered Rounds: I performed bedside rounds with nursing staff today. Discussions with Specialists or Other Care Team Provider: FLY      Education and Discussions with Family / Patient: Updated  (daughter) via phone. Total Time Spent on Date of Encounter in care of patient: 60 mins. This time was spent on one or more of the following: performing physical exam; counseling and coordination of care; obtaining or reviewing history; documenting in the medical record; reviewing/ordering tests, medications or procedures; communicating with other healthcare professionals and discussing with patient's family/caregivers. Current Length of Stay: 3 day(s)  Current Patient Status: Inpatient   Certification Statement: The patient will continue to require additional inpatient hospital stay due to COPD exacerbation. Discharge Plan: Anticipate discharge in 24-48 hrs to home. Code Status: Level 3 - DNAR and DNI    Subjective: Patient seen and examined. He said his breathing is a little easier. He is reporting some constipation and low abdominal pain. He said he felt better after having 2 medium bowel movements. Objective:     Vitals:   Temp (24hrs), Av.5 °F (36.4 °C), Min:97.3 °F (36.3 °C), Max:98 °F (36.7 °C)    Temp:  [97.3 °F (36.3 °C)-98 °F (36.7 °C)] 97.4 °F (36.3 °C)  HR:  [] 92  Resp:  [16-18] 18  BP: (113-125)/(70-80) 125/80  SpO2:  [82 %-99 %] 98 %  Body mass index is 26.31 kg/m². Input and Output Summary (last 24 hours): Intake/Output Summary (Last 24 hours) at 2023 1534  Last data filed at 2023 1300  Gross per 24 hour   Intake 360 ml   Output 2000 ml   Net -1640 ml       Physical Exam:   Physical Exam  Vitals and nursing note reviewed. Constitutional:       General: He is not in acute distress. Appearance: He is ill-appearing. HENT:      Head: Normocephalic and atraumatic. Right Ear: Decreased hearing noted.       Left Ear: Decreased hearing noted. Mouth/Throat:      Mouth: Mucous membranes are moist.      Pharynx: Oropharynx is clear. Eyes:      Pupils: Pupils are equal, round, and reactive to light. Cardiovascular:      Rate and Rhythm: Normal rate and regular rhythm. Pulses: Normal pulses. Pulmonary:      Effort: Pulmonary effort is normal. No respiratory distress. Breath sounds: Decreased breath sounds present. Abdominal:      General: Bowel sounds are normal.      Palpations: Abdomen is soft. Tenderness: There is no abdominal tenderness. Musculoskeletal:      Cervical back: Neck supple. Right lower leg: No edema. Left lower leg: No edema. Skin:     General: Skin is warm and dry. Capillary Refill: Capillary refill takes less than 2 seconds. Neurological:      General: No focal deficit present. Mental Status: He is alert and oriented to person, place, and time. Mental status is at baseline.           Additional Data:     Labs:  Results from last 7 days   Lab Units 12/05/23  0453   WBC Thousand/uL 16.05*   HEMOGLOBIN g/dL 13.0   HEMATOCRIT % 42.4   PLATELETS Thousands/uL 288   NEUTROS PCT % 83*   LYMPHS PCT % 5*   MONOS PCT % 10   EOS PCT % 0     Results from last 7 days   Lab Units 12/05/23  0453   SODIUM mmol/L 140   POTASSIUM mmol/L 4.0   CHLORIDE mmol/L 100   CO2 mmol/L 33*   BUN mg/dL 31*   CREATININE mg/dL 1.15   ANION GAP mmol/L 7   CALCIUM mg/dL 8.1*   ALBUMIN g/dL 3.4*   TOTAL BILIRUBIN mg/dL 0.60   ALK PHOS U/L 52   ALT U/L 58*   AST U/L 34   GLUCOSE RANDOM mg/dL 103     Results from last 7 days   Lab Units 12/01/23  1452   INR  0.97             Results from last 7 days   Lab Units 12/04/23  0517 12/02/23  0450 12/01/23  1801 12/01/23  1452   LACTIC ACID mmol/L  --   --  1.4 2.8*   PROCALCITONIN ng/ml 0.07 0.08  --   --        Lines/Drains:  Invasive Devices       Peripheral Intravenous Line  Duration             Peripheral IV 12/04/23 Distal;Left;Upper;Ventral (anterior) Arm <1 day                    Imaging: Reviewed radiology reports from this admission including: chest xray and chest CT scan    Recent Cultures (last 7 days):   Results from last 7 days   Lab Units 12/01/23  1515 12/01/23  1452   BLOOD CULTURE  No Growth at 72 hrs. No Growth at 72 hrs. Last 24 Hours Medication List:   Current Facility-Administered Medications   Medication Dose Route Frequency Provider Last Rate    acetaminophen  650 mg Oral Q6H PRN Chelsie Bokeelia, DO      albuterol  2 puff Inhalation Q4H PRN Chelsie Bokeelia, DO      ALPRAZolam  0.25 mg Oral HS PRN Chelsie Bokeelia, DO      aspirin  81 mg Oral Daily Chelsie Bokeelia, DO      atorvastatin  80 mg Oral Daily With [employer], DO      baricitinib  4 mg Oral Q24H Chelsie Bokeelia, DO      cefTRIAXone  1,000 mg Intravenous Q24H Chelsie Bokeelia, DO 1,000 mg (12/04/23 1800)    dexamethasone  6 mg Intravenous Q24H Chelsie Bokeelia, DO      Empagliflozin  10 mg Oral QAM Chelsie Bokeelia, DO      famotidine  20 mg Oral HS Chelsie Bokeelia, DO      fluticasone  1 spray Each Nare Daily Chelsie Bokeelia, DO      furosemide  20 mg Oral Daily Chelsie Bokeelia, DO      gabapentin  300 mg Oral HS Chelsie Bokeelia, DO      guaiFENesin  600 mg Oral BID Chelsie Bokeelia, DO      heparin (porcine)  5,000 Units Subcutaneous [facility] Chelsie Bokeelia, DO      ipratropium  0.5 mg Nebulization TID Chelsie Bokeelia, DO      levalbuterol  1.25 mg Nebulization TID Chelsie Bokeelia, DO      methocarbamol  500 mg Oral TID PRN Chelsie Bokeelia, DO      metoprolol succinate  12.5 mg Oral BID Chelsie Bokeelia, DO      pantoprazole  40 mg Oral Early Morning Chelsie Bokeelia, DO      remdesivir  100 mg Intravenous Q24H Chelsie Bokeelia, DO      sodium chloride  1 spray Each Nare Q1H PRN Julieta Holm PA-C          Today, Patient Was Seen By: INOCENTE Kilgore    **Please Note: This note may have been constructed using a voice recognition system. **

## 2023-12-05 NOTE — PLAN OF CARE
Problem: Potential for Falls  Goal: Patient will remain free of falls  Description: INTERVENTIONS:  - Educate patient/family on patient safety including physical limitations  - Instruct patient to call for assistance with activity   - Consult OT/PT to assist with strengthening/mobility   - Keep Call bell within reach  - Keep bed low and locked with side rails adjusted as appropriate  - Keep care items and personal belongings within reach  - Initiate and maintain comfort rounds  - Make Fall Risk Sign visible to staff  - Offer Toileting in advance of need  - Initiate/Maintain bed alarm  - Obtain necessary fall risk management equipment  - Apply yellow socks and bracelet for high fall risk patients  - Consider moving patient to room near nurses station  Outcome: Progressing     Problem: INFECTION - ADULT  Goal: Absence or prevention of progression during hospitalization  Description: INTERVENTIONS:  - Assess and monitor for signs and symptoms of infection  - Monitor lab/diagnostic results  - Monitor all insertion sites, i.e. indwelling lines, tubes, and drains  - Monitor endotracheal if appropriate and nasal secretions for changes in amount and color  - [place] appropriate cooling/warming therapies per order  - Administer medications as ordered  - Instruct and encourage patient and family to use good hand hygiene technique  - Identify and instruct in appropriate isolation precautions for identified infection/condition  Outcome: Progressing     Problem: DISCHARGE PLANNING  Goal: Discharge to home or other facility with appropriate resources  Description: INTERVENTIONS:  - Identify barriers to discharge w/patient and caregiver  - Arrange for needed discharge resources and transportation as appropriate  - Identify discharge learning needs (meds, wound care, etc.)  - Refer to Case Management Department for coordinating discharge planning if the patient needs post-hospital services based on physician/advanced practitioner order or complex needs related to functional status, cognitive ability, or social support system  Outcome: Progressing     Problem: Knowledge Deficit  Goal: Patient/family/caregiver demonstrates understanding of disease process, treatment plan, medications, and discharge instructions  Description: Complete learning assessment and assess knowledge base.   Interventions:  - Provide teaching at level of understanding  - Provide teaching via preferred learning methods  Outcome: Progressing     Problem: RESPIRATORY - ADULT  Goal: Achieves optimal ventilation and oxygenation  Description: INTERVENTIONS:  - Assess for changes in respiratory status  - Assess for changes in mentation and behavior  - Position to facilitate oxygenation and minimize respiratory effort  - Oxygen administered by appropriate delivery if ordered  - Initiate smoking cessation education as indicated  - Encourage broncho-pulmonary hygiene including cough, deep breathe, Incentive Spirometry  - Assess the need for suctioning and aspirate as needed  - Assess and instruct to report SOB or any respiratory difficulty  - Respiratory Therapy support as indicated  Outcome: Progressing

## 2023-12-05 NOTE — NURSING NOTE
Awake and pleasant. He states at home is on thick liquids and chopped diet for a swallowing issue. He seems to have been tolerating his regular diet, but I have given him his nectar thick liquids per request. I will make slim provider aware. 02 is maintained at 5 liters n/c and 02 sat is 97%. Hr is 103/min. He is sob with exertion and less with rest. He refuses scds. Call bell is near.

## 2023-12-06 ENCOUNTER — HOME HEALTH ADMISSION (OUTPATIENT)
Dept: HOME HEALTH SERVICES | Facility: HOME HEALTHCARE | Age: 78
End: 2023-12-06
Payer: COMMERCIAL

## 2023-12-06 LAB
ANION GAP SERPL CALCULATED.3IONS-SCNC: 6 MMOL/L
BASOPHILS # BLD AUTO: 0.02 THOUSANDS/ÂΜL (ref 0–0.1)
BASOPHILS NFR BLD AUTO: 0 % (ref 0–1)
BUN SERPL-MCNC: 30 MG/DL (ref 5–25)
CALCIUM SERPL-MCNC: 8.3 MG/DL (ref 8.4–10.2)
CHLORIDE SERPL-SCNC: 101 MMOL/L (ref 96–108)
CO2 SERPL-SCNC: 34 MMOL/L (ref 21–32)
CREAT SERPL-MCNC: 1.13 MG/DL (ref 0.6–1.3)
EOSINOPHIL # BLD AUTO: 0 THOUSAND/ÂΜL (ref 0–0.61)
EOSINOPHIL NFR BLD AUTO: 0 % (ref 0–6)
ERYTHROCYTE [DISTWIDTH] IN BLOOD BY AUTOMATED COUNT: 16.3 % (ref 11.6–15.1)
GFR SERPL CREATININE-BSD FRML MDRD: 61 ML/MIN/1.73SQ M
GLUCOSE SERPL-MCNC: 108 MG/DL (ref 65–140)
HCT VFR BLD AUTO: 42.8 % (ref 36.5–49.3)
HGB BLD-MCNC: 13.4 G/DL (ref 12–17)
IMM GRANULOCYTES # BLD AUTO: 0.17 THOUSAND/UL (ref 0–0.2)
IMM GRANULOCYTES NFR BLD AUTO: 2 % (ref 0–2)
LYMPHOCYTES # BLD AUTO: 0.75 THOUSANDS/ÂΜL (ref 0.6–4.47)
LYMPHOCYTES NFR BLD AUTO: 7 % (ref 14–44)
MCH RBC QN AUTO: 31.4 PG (ref 26.8–34.3)
MCHC RBC AUTO-ENTMCNC: 31.3 G/DL (ref 31.4–37.4)
MCV RBC AUTO: 100 FL (ref 82–98)
MONOCYTES # BLD AUTO: 0.71 THOUSAND/ÂΜL (ref 0.17–1.22)
MONOCYTES NFR BLD AUTO: 7 % (ref 4–12)
NEUTROPHILS # BLD AUTO: 8.9 THOUSANDS/ÂΜL (ref 1.85–7.62)
NEUTS SEG NFR BLD AUTO: 84 % (ref 43–75)
NRBC BLD AUTO-RTO: 0 /100 WBCS
PLATELET # BLD AUTO: 264 THOUSANDS/UL (ref 149–390)
PMV BLD AUTO: 10.7 FL (ref 8.9–12.7)
POTASSIUM SERPL-SCNC: 4.3 MMOL/L (ref 3.5–5.3)
RBC # BLD AUTO: 4.27 MILLION/UL (ref 3.88–5.62)
SODIUM SERPL-SCNC: 141 MMOL/L (ref 135–147)
WBC # BLD AUTO: 10.55 THOUSAND/UL (ref 4.31–10.16)

## 2023-12-06 PROCEDURE — 94640 AIRWAY INHALATION TREATMENT: CPT

## 2023-12-06 PROCEDURE — 94760 N-INVAS EAR/PLS OXIMETRY 1: CPT

## 2023-12-06 PROCEDURE — 85025 COMPLETE CBC W/AUTO DIFF WBC: CPT | Performed by: NURSE PRACTITIONER

## 2023-12-06 PROCEDURE — 80048 BASIC METABOLIC PNL TOTAL CA: CPT | Performed by: NURSE PRACTITIONER

## 2023-12-06 PROCEDURE — 94664 DEMO&/EVAL PT USE INHALER: CPT

## 2023-12-06 RX ORDER — FLUTICASONE PROPIONATE 50 MCG
1 SPRAY, SUSPENSION (ML) NASAL 2 TIMES DAILY
Status: DISCONTINUED | OUTPATIENT
Start: 2023-12-06 | End: 2023-12-09 | Stop reason: HOSPADM

## 2023-12-06 RX ADMIN — GUAIFENESIN 600 MG: 600 TABLET ORAL at 17:04

## 2023-12-06 RX ADMIN — LEVALBUTEROL HYDROCHLORIDE 1.25 MG: 1.25 SOLUTION RESPIRATORY (INHALATION) at 08:09

## 2023-12-06 RX ADMIN — ATORVASTATIN CALCIUM 80 MG: 80 TABLET, FILM COATED ORAL at 17:05

## 2023-12-06 RX ADMIN — HEPARIN SODIUM 5000 UNITS: 5000 INJECTION INTRAVENOUS; SUBCUTANEOUS at 14:44

## 2023-12-06 RX ADMIN — REMDESIVIR 100 MG: 100 INJECTION, POWDER, LYOPHILIZED, FOR SOLUTION INTRAVENOUS at 11:17

## 2023-12-06 RX ADMIN — DEXAMETHASONE SODIUM PHOSPHATE 6 MG: 10 INJECTION, SOLUTION INTRAMUSCULAR; INTRAVENOUS at 11:17

## 2023-12-06 RX ADMIN — BARICITINIB 4 MG: 2 TABLET, FILM COATED ORAL at 11:18

## 2023-12-06 RX ADMIN — METOPROLOL SUCCINATE 12.5 MG: 25 TABLET, EXTENDED RELEASE ORAL at 08:16

## 2023-12-06 RX ADMIN — CEFTRIAXONE 1000 MG: 1 INJECTION, SOLUTION INTRAVENOUS at 19:47

## 2023-12-06 RX ADMIN — FAMOTIDINE 20 MG: 20 TABLET ORAL at 22:34

## 2023-12-06 RX ADMIN — PANTOPRAZOLE SODIUM 40 MG: 40 TABLET, DELAYED RELEASE ORAL at 06:36

## 2023-12-06 RX ADMIN — GUAIFENESIN 600 MG: 600 TABLET ORAL at 08:16

## 2023-12-06 RX ADMIN — ASPIRIN 81 MG CHEWABLE TABLET 81 MG: 81 TABLET CHEWABLE at 08:16

## 2023-12-06 RX ADMIN — ALBUTEROL SULFATE 2 PUFF: 108 INHALANT RESPIRATORY (INHALATION) at 23:47

## 2023-12-06 RX ADMIN — HEPARIN SODIUM 5000 UNITS: 5000 INJECTION INTRAVENOUS; SUBCUTANEOUS at 22:34

## 2023-12-06 RX ADMIN — IPRATROPIUM BROMIDE 0.5 MG: 0.5 SOLUTION RESPIRATORY (INHALATION) at 20:24

## 2023-12-06 RX ADMIN — GABAPENTIN 300 MG: 300 CAPSULE ORAL at 22:34

## 2023-12-06 RX ADMIN — FUROSEMIDE 20 MG: 20 TABLET ORAL at 08:17

## 2023-12-06 RX ADMIN — ALBUTEROL SULFATE 2 PUFF: 108 INHALANT RESPIRATORY (INHALATION) at 18:17

## 2023-12-06 RX ADMIN — ALPRAZOLAM 0.25 MG: 0.25 TABLET ORAL at 23:50

## 2023-12-06 RX ADMIN — IPRATROPIUM BROMIDE 0.5 MG: 0.5 SOLUTION RESPIRATORY (INHALATION) at 08:09

## 2023-12-06 RX ADMIN — EMPAGLIFLOZIN 10 MG: 10 TABLET, FILM COATED ORAL at 08:16

## 2023-12-06 RX ADMIN — FLUTICASONE PROPIONATE 1 SPRAY: 50 SPRAY, METERED NASAL at 17:04

## 2023-12-06 RX ADMIN — LEVALBUTEROL HYDROCHLORIDE 1.25 MG: 1.25 SOLUTION RESPIRATORY (INHALATION) at 14:09

## 2023-12-06 RX ADMIN — FLUTICASONE PROPIONATE 1 SPRAY: 50 SPRAY, METERED NASAL at 08:19

## 2023-12-06 RX ADMIN — IPRATROPIUM BROMIDE 0.5 MG: 0.5 SOLUTION RESPIRATORY (INHALATION) at 14:09

## 2023-12-06 RX ADMIN — HEPARIN SODIUM 5000 UNITS: 5000 INJECTION INTRAVENOUS; SUBCUTANEOUS at 06:36

## 2023-12-06 RX ADMIN — LEVALBUTEROL HYDROCHLORIDE 1.25 MG: 1.25 SOLUTION RESPIRATORY (INHALATION) at 20:24

## 2023-12-06 NOTE — ASSESSMENT & PLAN NOTE
8/18 TTE LVEF 35% with moderately reduced systolic function  Home regimen is Lasix 20 mg daily  Appears euvolemic on exam  Continue Lasix 20 mg daily  Intake and output monitoring  Daily weights

## 2023-12-06 NOTE — PLAN OF CARE
Problem: Potential for Falls  Goal: Patient will remain free of falls  Description: INTERVENTIONS:  - Educate patient/family on patient safety including physical limitations  - Instruct patient to call for assistance with activity   - Consult OT/PT to assist with strengthening/mobility   - Keep Call bell within reach  - Keep bed low and locked with side rails adjusted as appropriate  - Keep care items and personal belongings within reach  - Initiate and maintain comfort rounds  - Make Fall Risk Sign visible to staff  - Offer Toileting in advance of need  - Initiate/Maintain bed alarm  - Obtain necessary fall risk management equipment  - Apply yellow socks and bracelet for high fall risk patients  - Consider moving patient to room near nurses station  Outcome: Progressing     Problem: INFECTION - ADULT  Goal: Absence or prevention of progression during hospitalization  Description: INTERVENTIONS:  - Assess and monitor for signs and symptoms of infection  - Monitor lab/diagnostic results  - Monitor all insertion sites, i.e. indwelling lines, tubes, and drains  - Monitor endotracheal if appropriate and nasal secretions for changes in amount and color  - [place] appropriate cooling/warming therapies per order  - Administer medications as ordered  - Instruct and encourage patient and family to use good hand hygiene technique  - Identify and instruct in appropriate isolation precautions for identified infection/condition  Outcome: Progressing     Problem: DISCHARGE PLANNING  Goal: Discharge to home or other facility with appropriate resources  Description: INTERVENTIONS:  - Identify barriers to discharge w/patient and caregiver  - Arrange for needed discharge resources and transportation as appropriate  - Identify discharge learning needs (meds, wound care, etc.)  - Refer to Case Management Department for coordinating discharge planning if the patient needs post-hospital services based on physician/advanced practitioner order or complex needs related to functional status, cognitive ability, or social support system  Outcome: Progressing     Problem: Knowledge Deficit  Goal: Patient/family/caregiver demonstrates understanding of disease process, treatment plan, medications, and discharge instructions  Description: Complete learning assessment and assess knowledge base.   Interventions:  - Provide teaching at level of understanding  - Provide teaching via preferred learning methods  Outcome: Progressing     Problem: RESPIRATORY - ADULT  Goal: Achieves optimal ventilation and oxygenation  Description: INTERVENTIONS:  - Assess for changes in respiratory status  - Assess for changes in mentation and behavior  - Position to facilitate oxygenation and minimize respiratory effort  - Oxygen administered by appropriate delivery if ordered  - Initiate smoking cessation education as indicated  - Encourage broncho-pulmonary hygiene including cough, deep breathe, Incentive Spirometry  - Assess the need for suctioning and aspirate as needed  - Assess and instruct to report SOB or any respiratory difficulty  - Respiratory Therapy support as indicated  Outcome: Progressing

## 2023-12-06 NOTE — CASE MANAGEMENT
Case Management Discharge Planning Note    Patient name Nasreen Both  Location /-01 MRN [id number]  : 1945 Date 2023       Current Admission Date: 2023  Current Admission Diagnosis:COPD with acute exacerbation [facility])   Patient Active Problem List    Diagnosis Date Noted    Chronic systolic congestive heart failure ([address]) 10/22/2023    Erectile dysfunction 2023    Other disorders of refraction 2023    Occlusion and stenosis of unspecified carotid artery 2023    Dysphagia 2023    Congestive heart failure with left ventricular systolic dysfunction (LVSD) ([address]) 2023    Hypoxemia 2023    Intracranial aneurysm 2023    Allergic rhinitis, unspecified 2023    Chronic kidney disease, stage 3a ([address]) 2023    Generalized muscle weakness 2023    Hypertensive heart and chronic kidney disease with heart failure and stage 1 through stage 4 chronic kidney disease, or unspecified chronic kidney disease ([address]) 2023    Need for assistance with personal care 2023    Other abnormalities of gait and mobility 2023    Retention of urine, unspecified 2023    Sudden idiopathic hearing loss, right ear 2023    Acute and chronic respiratory failure with hypoxia ([address]) 2023    Elevated troponin 2023    Orthopnea 2023    Lower extremity edema 2023    Irregular heart rhythm 2023    Pneumonia of right lower lobe due to infectious organism 03/15/2023    Leukocytosis 2023    Left leg pain 2023    Transient right leg weakness 2023    Hypokalemia 2023    COVID-19 2023    Ambulatory dysfunction 2023    COPD (chronic obstructive pulmonary disease) ([address]) 2023    Sensorineural hearing loss, bilateral 10/19/2022    Chronic respiratory failure with hypoxia ([address]) 10/15/2022    Prostate cancer ([address]) 2022    Elevated MCV 2022    Cubital tunnel syndrome on left 07/22/2022    Carpal tunnel syndrome on left 07/22/2022    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    Urinary frequency 04/27/2022    Incomplete bladder emptying 04/27/2022    Chronic bilateral low back pain with right-sided sciatica 04/18/2022    Mid back pain 04/18/2022    Thoracic radiculopathy 04/18/2022    Chronic pain syndrome 04/18/2022    Hoarseness or changing voice 04/14/2022    Chronic right-sided thoracic back pain 03/05/2022    Primary insomnia 03/02/2022    Right hip pain 01/20/2022    Abnormal nuclear stress test 03/18/2021    Costochondral chest pain 01/15/2021    COPD with acute exacerbation ([address]) 01/11/2021    Oxygen dependent 01/11/2021    S/P carotid endarterectomy 01/11/2021    Stented coronary artery 01/11/2021    Vertigo 01/11/2021    Anisometropia 01/11/2021    Osteoarthritis of spinal facet joint 01/11/2021    Chronic ischemic heart disease 01/11/2021    Diverticular disease of colon 01/11/2021    Dry eye syndrome 01/11/2021    GERD (gastroesophageal reflux disease) 01/11/2021    Acute hearing loss, right 01/11/2021    Elevated PSA 01/11/2021    Hypoxia 01/11/2021    Lens replaced by other means 01/11/2021    Lumbar radiculopathy 01/11/2021    Multinodular goiter 01/11/2021    Nuclear senile cataract 01/11/2021    Obesity 01/11/2021    Occlusion and stenosis of carotid artery 01/11/2021    Osteoarthritis of hip 01/11/2021    Prediabetes 01/11/2021    LAMBERTO on CPAP 01/11/2021    Solitary pulmonary nodule 01/11/2021    Thyroid nodule 01/11/2021    Guyon syndrome, 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  [address] [facility]  [address] [place] [address]  Phone: [phone] Fax: [phone] [address] [facility], [address]  Phone: [phone] Fax: [phone]    Primary Care Provider: Michelle Warren DO    Primary Insurance: [facility]  Secondary Insurance:     DISCHARGE DETAILS:       Freedom of Choice: Yes  Comments - Freedom of Choice: pt was accepted by [facility]  families choice-   pt/ot eval ordered - d/c plan may need to change                               Other Referral/Resources/Interventions Provided:  Interventions: [facility]  Referral Comments: pt accepted by [facility]-  pt/ot eval ordered    Would you like to participate in our [address] service program?  : No - Declined    Treatment Team Recommendation: Home with [address] [facility] (d/c plan tbd - pt/ot eval orderd- tbd)

## 2023-12-06 NOTE — PLAN OF CARE
Problem: Potential for Falls  Goal: Patient will remain free of falls  Description: INTERVENTIONS:  - Educate patient/family on patient safety including physical limitations  - Instruct patient to call for assistance with activity   - Consult OT/PT to assist with strengthening/mobility   - Keep Call bell within reach  - Keep bed low and locked with side rails adjusted as appropriate  - Keep care items and personal belongings within reach  - Initiate and maintain comfort rounds  - Make Fall Risk Sign visible to staff  - Offer Toileting in advance of need  - Initiate/Maintain bed alarm  - Obtain necessary fall risk management equipment  - Apply yellow socks and bracelet for high fall risk patients  - Consider moving patient to room near nurses station  Outcome: Progressing     Problem: INFECTION - ADULT  Goal: Absence or prevention of progression during hospitalization  Description: INTERVENTIONS:  - Assess and monitor for signs and symptoms of infection  - Monitor lab/diagnostic results  - Monitor all insertion sites, i.e. indwelling lines, tubes, and drains  - Monitor endotracheal if appropriate and nasal secretions for changes in amount and color  - [place] appropriate cooling/warming therapies per order  - Administer medications as ordered  - Instruct and encourage patient and family to use good hand hygiene technique  - Identify and instruct in appropriate isolation precautions for identified infection/condition  Outcome: Progressing     Problem: DISCHARGE PLANNING  Goal: Discharge to home or other facility with appropriate resources  Description: INTERVENTIONS:  - Identify barriers to discharge w/patient and caregiver  - Arrange for needed discharge resources and transportation as appropriate  - Identify discharge learning needs (meds, wound care, etc.)  - Refer to Case Management Department for coordinating discharge planning if the patient needs post-hospital services based on physician/advanced practitioner order or complex needs related to functional status, cognitive ability, or social support system  Outcome: Progressing     Problem: Knowledge Deficit  Goal: Patient/family/caregiver demonstrates understanding of disease process, treatment plan, medications, and discharge instructions  Description: Complete learning assessment and assess knowledge base.   Interventions:  - Provide teaching at level of understanding  - Provide teaching via preferred learning methods  Outcome: Progressing     Problem: RESPIRATORY - ADULT  Goal: Achieves optimal ventilation and oxygenation  Description: INTERVENTIONS:  - Assess for changes in respiratory status  - Assess for changes in mentation and behavior  - Position to facilitate oxygenation and minimize respiratory effort  - Oxygen administered by appropriate delivery if ordered  - Initiate smoking cessation education as indicated  - Encourage broncho-pulmonary hygiene including cough, deep breathe, Incentive Spirometry  - Assess the need for suctioning and aspirate as needed  - Assess and instruct to report SOB or any respiratory difficulty  - Respiratory Therapy support as indicated  Outcome: Progressing

## 2023-12-06 NOTE — ASSESSMENT & PLAN NOTE
Presented with shortness of breath and wheezing  Requiring 5 L of nasal cannula supplemental O2 up from baseline of 2-3 L  Currently on 3.5 L nasal cannula oxygen  COVID-positive 12/1  Patient is being treated with mild COVID pathway  S/p Azithromycin 500 mg oral x 3 days  Continue ceftriaxone 1g IV daily, D6  Respiratory protocol  Continue nebulizer treatments

## 2023-12-06 NOTE — ASSESSMENT & PLAN NOTE
Present on admission   Patient required 5 L nasal cannula oxygen to maintain saturation greater than 90%  Patient requires 2-3 L of nasal cannula supplemental O2 at baseline  Likely secondary to COPD exacerbation/COVID-positive  Currently on 3.5 L nasal cannula  Continue mild COVID pathway  Respiratory protocol-continue to wean oxygen as able

## 2023-12-06 NOTE — PROGRESS NOTES
[address]  Progress Note  Name: Adi Krueger  MRN: [id number]  Unit/Bed#: -01 I Date of Admission: 12/1/2023   Date of Service: 12/6/2023 I Hospital Day: 4    Assessment/Plan   * COPD with acute exacerbation [facility])  Assessment & Plan  Presented with shortness of breath and wheezing  Requiring 5 L of nasal cannula supplemental O2 up from baseline of 2-3 L  Currently on 3.5 L nasal cannula oxygen  COVID-positive 12/1  Patient is being treated with mild COVID pathway  S/p Azithromycin 500 mg oral x 3 days  Continue ceftriaxone 1g IV daily, D6  Respiratory protocol  Continue nebulizer treatments    COVID-19  Assessment & Plan  COVID-19 positive on 12/1/2023  Initially required 5 L nasal cannula oxygen to maintain saturation greater than 90%  Currently on 3.5 L nasal cannula  Baseline is 2 to 3 L nasal cannula  Continue moderate COVID-19 pathway  Respiratory protocol, continue to wean oxygen as able  Encourage out of bed to chair  I-S  Lie prone if tolerates  Isolation precautions    Acute and chronic respiratory failure with hypoxia ([address])  Assessment & Plan  Present on admission   Patient required 5 L nasal cannula oxygen to maintain saturation greater than 90%  Patient requires 2-3 L of nasal cannula supplemental O2 at baseline  Likely secondary to COPD exacerbation/COVID-positive  Currently on 3.5 L nasal cannula  Continue mild COVID pathway  Respiratory protocol-continue to wean oxygen as able    Chronic systolic congestive heart failure ([address])  Assessment & Plan  8/18 TTE LVEF 35% with moderately reduced systolic function  Home regimen is Lasix 20 mg daily  Appears euvolemic on exam  Continue Lasix 20 mg daily  Intake and output monitoring  Daily weights    LAMBERTO on CPAP  Assessment & Plan  CPAP at night    Hyperlipidemia  Assessment & Plan  Home Crestor therapeutic substitution with Lipitor while inpatient    GERD (gastroesophageal reflux disease)  Assessment & Plan  Continue home Pepcid and Protonix               VTE Pharmacologic Prophylaxis: VTE Score: 6 High Risk (Score >/= 5) - Pharmacological DVT Prophylaxis Ordered: heparin. Sequential Compression Devices Ordered. Mobility:   Basic Mobility Inpatient Raw Score: 22  JH-HLM Goal: 7: Walk 25 feet or more  JH-HLM Achieved: 7: Walk 25 feet or more  HLM Goal achieved. Continue to encourage appropriate mobility. Patient Centered Rounds: I performed bedside rounds with nursing staff today. Discussions with Specialists or Other Care Team Provider: Nursing, PT/OT, case management    Education and Discussions with Family / Patient: Updated  (daughter) via phone. Total Time Spent on Date of Encounter in care of patient: 40 mins. This time was spent on one or more of the following: performing physical exam; counseling and coordination of care; obtaining or reviewing history; documenting in the medical record; reviewing/ordering tests, medications or procedures; communicating with other healthcare professionals and discussing with patient's family/caregivers. Current Length of Stay: 4 day(s)  Current Patient Status: Inpatient   Certification Statement: The patient will continue to require additional inpatient hospital stay due to continues on IV remdesivir, weaning oxygen, nebulizer treatments  Discharge Plan: Anticipate discharge in 48 hrs to home. Code Status: Level 3 - DNAR and DNI    Subjective:   Patient states he is still short of breath today and continues with moist cough at times. Objective:     Vitals:   Temp (24hrs), Av.7 °F (36.5 °C), Min:97.7 °F (36.5 °C), Max:97.7 °F (36.5 °C)    Temp:  [97.7 °F (36.5 °C)] 97.7 °F (36.5 °C)  HR:  [93-98] 97  Resp:  [18] 18  BP: (103-111)/(66-73) 111/73  SpO2:  [93 %-100 %] 100 %  Body mass index is 26.31 kg/m². Input and Output Summary (last 24 hours):      Intake/Output Summary (Last 24 hours) at 2023 1304  Last data filed at 2023 1124  Gross per 24 hour   Intake 120 ml   Output 1000 ml   Net -880 ml       Physical Exam:   Physical Exam  Vitals and nursing note reviewed. Constitutional:       General: He is not in acute distress. Appearance: He is well-developed. He is obese. HENT:      Head: Normocephalic and atraumatic. Nose: Congestion present. Mouth/Throat:      Mouth: Mucous membranes are moist.   Cardiovascular:      Rate and Rhythm: Normal rate and regular rhythm. Pulses: Normal pulses. Heart sounds: Normal heart sounds. No murmur heard. Pulmonary:      Effort: Pulmonary effort is normal. No respiratory distress. Breath sounds: Wheezing present. Comments: O2 3.5 Liters nasal cannula, short of breath with exertion, occasional moist cough  Abdominal:      General: Bowel sounds are normal. There is no distension. Palpations: Abdomen is soft. Tenderness: There is no abdominal tenderness. There is no guarding. Musculoskeletal:         General: No swelling. Normal range of motion. Skin:     General: Skin is warm and dry. Capillary Refill: Capillary refill takes less than 2 seconds. Neurological:      General: No focal deficit present. Mental Status: He is alert. Mental status is at baseline. Psychiatric:         Mood and Affect: Mood normal.         Behavior: Behavior normal.         Thought Content:  Thought content normal.         Judgment: Judgment normal.          Additional Data:     Labs:  Results from last 7 days   Lab Units 12/06/23  0455   WBC Thousand/uL 10.55*   HEMOGLOBIN g/dL 13.4   HEMATOCRIT % 42.8   PLATELETS Thousands/uL 264   NEUTROS PCT % 84*   LYMPHS PCT % 7*   MONOS PCT % 7   EOS PCT % 0     Results from last 7 days   Lab Units 12/06/23  0455 12/05/23  0453   SODIUM mmol/L 141 140   POTASSIUM mmol/L 4.3 4.0   CHLORIDE mmol/L 101 100   CO2 mmol/L 34* 33*   BUN mg/dL 30* 31*   CREATININE mg/dL 1.13 1.15   ANION GAP mmol/L 6 7   CALCIUM mg/dL 8.3* 8.1*   ALBUMIN g/dL  --  3.4* TOTAL BILIRUBIN mg/dL  --  0.60   ALK PHOS U/L  --  52   ALT U/L  --  58*   AST U/L  --  34   GLUCOSE RANDOM mg/dL 108 103     Results from last 7 days   Lab Units 12/01/23  1452   INR  0.97             Results from last 7 days   Lab Units 12/04/23  0517 12/02/23  0450 12/01/23  1801 12/01/23  1452   LACTIC ACID mmol/L  --   --  1.4 2.8*   PROCALCITONIN ng/ml 0.07 0.08  --   --        Lines/Drains:  Invasive Devices       Peripheral Intravenous Line  Duration             Peripheral IV 12/04/23 Distal;Left;Upper;Ventral (anterior) Arm 1 day                          Imaging: Reviewed radiology reports from this admission including: chest xray and chest CT scan    Recent Cultures (last 7 days):   Results from last 7 days   Lab Units 12/01/23  1515 12/01/23  1452   BLOOD CULTURE  No Growth After 4 Days. No Growth After 4 Days.        Last 24 Hours Medication List:   Current Facility-Administered Medications   Medication Dose Route Frequency Provider Last Rate    acetaminophen  650 mg Oral Q6H PRN Fink Barefoot, DO      albuterol  2 puff Inhalation Q4H PRN [place] Barefoot, DO      ALPRAZolam  0.25 mg Oral HS PRN [place] Barefoot, DO      aspirin  81 mg Oral Daily [place] Barefoot, DO      atorvastatin  80 mg Oral Daily With [employer], DO      baricitinib  4 mg Oral Q24H Fink Barefoot, DO      cefTRIAXone  1,000 mg Intravenous Q24H [place]ado Barefoot, DO 1,000 mg (12/05/23 1819)    dexamethasone  6 mg Intravenous Q24H Kanorado Barefoot, DO      Empagliflozin  10 mg Oral QAM [place]ado Barefoot, DO      famotidine  20 mg Oral HS Fink Barefoot, DO      fluticasone  1 spray Each Nare Daily Fink Barefoot, DO      furosemide  20 mg Oral Daily Fink Barefoot, DO      gabapentin  300 mg Oral HS Fink Barefoot, DO      guaiFENesin  600 mg Oral BID Fink Barefoot, DO      heparin (porcine)  5,000 Units Subcutaneous [facility] Sandor, DO      ipratropium  0.5 mg Nebulization TID Fink Barefoot, DO levalbuterol  1.25 mg Nebulization TID [place] Souza, DO      methocarbamol  500 mg Oral TID PRN [place] Souza, DO      metoprolol succinate  12.5 mg Oral BID [place]ton Souza, DO      pantoprazole  40 mg Oral Early Morning [place]ton Souza, DO      sodium chloride  1 spray Each Nare Q1H PRN Marichuy Vela PA-C          Today, Patient Was Seen By: INOCENTE Maldonado    **Please Note: This note may have been constructed using a voice recognition system. **

## 2023-12-06 NOTE — PROGRESS NOTES
Patient:    MRN:  [id number]    Aidin Request ID:  [id number]    Level of care reserved:    Partner Reserved:    Clinical needs requested:    Geography searched:  [phone]    Start of Service:    Request sent:  4:54pm EST on 12/5/2023 by Min Dumont    Partner reserved:  by Min Dumont    Choice list shared:  10:24am EST on 12/6/2023 by Min Dumont

## 2023-12-06 NOTE — ASSESSMENT & PLAN NOTE
COVID-19 positive on 12/1/2023  Initially required 5 L nasal cannula oxygen to maintain saturation greater than 90%  Currently on 3.5 L nasal cannula  Baseline is 2 to 3 L nasal cannula  Continue moderate COVID-19 pathway  Respiratory protocol, continue to wean oxygen as able  Encourage out of bed to chair  I-S  Lie prone if tolerates  Isolation precautions

## 2023-12-07 LAB
ANION GAP SERPL CALCULATED.3IONS-SCNC: 8 MMOL/L
BACTERIA BLD CULT: NORMAL
BACTERIA BLD CULT: NORMAL
BASOPHILS # BLD AUTO: 0.05 THOUSANDS/ÂΜL (ref 0–0.1)
BASOPHILS NFR BLD AUTO: 0 % (ref 0–1)
BUN SERPL-MCNC: 29 MG/DL (ref 5–25)
CALCIUM SERPL-MCNC: 8.5 MG/DL (ref 8.4–10.2)
CHLORIDE SERPL-SCNC: 101 MMOL/L (ref 96–108)
CO2 SERPL-SCNC: 31 MMOL/L (ref 21–32)
CREAT SERPL-MCNC: 1.07 MG/DL (ref 0.6–1.3)
EOSINOPHIL # BLD AUTO: 0.01 THOUSAND/ÂΜL (ref 0–0.61)
EOSINOPHIL NFR BLD AUTO: 0 % (ref 0–6)
ERYTHROCYTE [DISTWIDTH] IN BLOOD BY AUTOMATED COUNT: 16.1 % (ref 11.6–15.1)
GFR SERPL CREATININE-BSD FRML MDRD: 65 ML/MIN/1.73SQ M
GLUCOSE SERPL-MCNC: 102 MG/DL (ref 65–140)
HCT VFR BLD AUTO: 46.5 % (ref 36.5–49.3)
HGB BLD-MCNC: 14.3 G/DL (ref 12–17)
IMM GRANULOCYTES # BLD AUTO: 0.25 THOUSAND/UL (ref 0–0.2)
IMM GRANULOCYTES NFR BLD AUTO: 2 % (ref 0–2)
LYMPHOCYTES # BLD AUTO: 0.78 THOUSANDS/ÂΜL (ref 0.6–4.47)
LYMPHOCYTES NFR BLD AUTO: 7 % (ref 14–44)
MCH RBC QN AUTO: 31 PG (ref 26.8–34.3)
MCHC RBC AUTO-ENTMCNC: 30.8 G/DL (ref 31.4–37.4)
MCV RBC AUTO: 101 FL (ref 82–98)
MONOCYTES # BLD AUTO: 0.88 THOUSAND/ÂΜL (ref 0.17–1.22)
MONOCYTES NFR BLD AUTO: 7 % (ref 4–12)
NEUTROPHILS # BLD AUTO: 9.87 THOUSANDS/ÂΜL (ref 1.85–7.62)
NEUTS SEG NFR BLD AUTO: 84 % (ref 43–75)
NRBC BLD AUTO-RTO: 0 /100 WBCS
PLATELET # BLD AUTO: 248 THOUSANDS/UL (ref 149–390)
PMV BLD AUTO: 11.9 FL (ref 8.9–12.7)
POTASSIUM SERPL-SCNC: 4.3 MMOL/L (ref 3.5–5.3)
RBC # BLD AUTO: 4.62 MILLION/UL (ref 3.88–5.62)
SODIUM SERPL-SCNC: 140 MMOL/L (ref 135–147)
WBC # BLD AUTO: 11.84 THOUSAND/UL (ref 4.31–10.16)

## 2023-12-07 PROCEDURE — 99233 SBSQ HOSP IP/OBS HIGH 50: CPT

## 2023-12-07 PROCEDURE — 94664 DEMO&/EVAL PT USE INHALER: CPT

## 2023-12-07 PROCEDURE — 94640 AIRWAY INHALATION TREATMENT: CPT

## 2023-12-07 PROCEDURE — 85025 COMPLETE CBC W/AUTO DIFF WBC: CPT

## 2023-12-07 PROCEDURE — 94760 N-INVAS EAR/PLS OXIMETRY 1: CPT

## 2023-12-07 PROCEDURE — 80048 BASIC METABOLIC PNL TOTAL CA: CPT

## 2023-12-07 RX ORDER — IPRATROPIUM BROMIDE AND ALBUTEROL SULFATE 2.5; .5 MG/3ML; MG/3ML
3 SOLUTION RESPIRATORY (INHALATION)
Status: DISCONTINUED | OUTPATIENT
Start: 2023-12-07 | End: 2023-12-07

## 2023-12-07 RX ORDER — DOCUSATE SODIUM 100 MG/1
100 CAPSULE, LIQUID FILLED ORAL DAILY
Status: DISCONTINUED | OUTPATIENT
Start: 2023-12-07 | End: 2023-12-09 | Stop reason: HOSPADM

## 2023-12-07 RX ORDER — FORMOTEROL FUMARATE DIHYDRATE 20 UG/2ML
20 SOLUTION RESPIRATORY (INHALATION)
Status: DISCONTINUED | OUTPATIENT
Start: 2023-12-07 | End: 2023-12-09 | Stop reason: HOSPADM

## 2023-12-07 RX ORDER — FORMOTEROL FUMARATE DIHYDRATE 20 UG/2ML
20 SOLUTION RESPIRATORY (INHALATION) 2 TIMES DAILY
Status: DISCONTINUED | OUTPATIENT
Start: 2023-12-07 | End: 2023-12-07

## 2023-12-07 RX ORDER — LEVALBUTEROL INHALATION SOLUTION 1.25 MG/3ML
1.25 SOLUTION RESPIRATORY (INHALATION)
Status: DISCONTINUED | OUTPATIENT
Start: 2023-12-07 | End: 2023-12-09 | Stop reason: HOSPADM

## 2023-12-07 RX ORDER — ALBUTEROL SULFATE 90 UG/1
2 AEROSOL, METERED RESPIRATORY (INHALATION) EVERY 4 HOURS PRN
Status: DISCONTINUED | OUTPATIENT
Start: 2023-12-07 | End: 2023-12-09 | Stop reason: HOSPADM

## 2023-12-07 RX ORDER — BUDESONIDE 0.5 MG/2ML
0.5 INHALANT ORAL
Status: DISCONTINUED | OUTPATIENT
Start: 2023-12-07 | End: 2023-12-09 | Stop reason: HOSPADM

## 2023-12-07 RX ADMIN — ACETAMINOPHEN 650 MG: 325 TABLET ORAL at 23:09

## 2023-12-07 RX ADMIN — LEVALBUTEROL HYDROCHLORIDE 1.25 MG: 1.25 SOLUTION RESPIRATORY (INHALATION) at 19:01

## 2023-12-07 RX ADMIN — GABAPENTIN 300 MG: 300 CAPSULE ORAL at 22:37

## 2023-12-07 RX ADMIN — BUDESONIDE 0.5 MG: 0.5 INHALANT ORAL at 19:01

## 2023-12-07 RX ADMIN — FORMOTEROL FUMARATE DIHYDRATE 20 MCG: 20 SOLUTION RESPIRATORY (INHALATION) at 19:23

## 2023-12-07 RX ADMIN — BARICITINIB 4 MG: 2 TABLET, FILM COATED ORAL at 09:37

## 2023-12-07 RX ADMIN — CEFTRIAXONE 1000 MG: 1 INJECTION, SOLUTION INTRAVENOUS at 19:42

## 2023-12-07 RX ADMIN — PANTOPRAZOLE SODIUM 40 MG: 40 TABLET, DELAYED RELEASE ORAL at 06:27

## 2023-12-07 RX ADMIN — GUAIFENESIN 600 MG: 600 TABLET ORAL at 09:37

## 2023-12-07 RX ADMIN — IPRATROPIUM BROMIDE 0.5 MG: 0.5 SOLUTION RESPIRATORY (INHALATION) at 06:30

## 2023-12-07 RX ADMIN — BUDESONIDE 0.5 MG: 0.5 INHALANT ORAL at 13:41

## 2023-12-07 RX ADMIN — FAMOTIDINE 20 MG: 20 TABLET ORAL at 22:37

## 2023-12-07 RX ADMIN — DEXAMETHASONE SODIUM PHOSPHATE 6 MG: 10 INJECTION, SOLUTION INTRAMUSCULAR; INTRAVENOUS at 09:37

## 2023-12-07 RX ADMIN — FLUTICASONE PROPIONATE 1 SPRAY: 50 SPRAY, METERED NASAL at 09:37

## 2023-12-07 RX ADMIN — DOCUSATE SODIUM 100 MG: 100 CAPSULE, LIQUID FILLED ORAL at 17:11

## 2023-12-07 RX ADMIN — ATORVASTATIN CALCIUM 80 MG: 80 TABLET, FILM COATED ORAL at 17:11

## 2023-12-07 RX ADMIN — HEPARIN SODIUM 5000 UNITS: 5000 INJECTION INTRAVENOUS; SUBCUTANEOUS at 14:53

## 2023-12-07 RX ADMIN — HEPARIN SODIUM 5000 UNITS: 5000 INJECTION INTRAVENOUS; SUBCUTANEOUS at 22:37

## 2023-12-07 RX ADMIN — ASPIRIN 81 MG CHEWABLE TABLET 81 MG: 81 TABLET CHEWABLE at 09:37

## 2023-12-07 RX ADMIN — ALPRAZOLAM 0.25 MG: 0.25 TABLET ORAL at 23:05

## 2023-12-07 RX ADMIN — GUAIFENESIN 600 MG: 600 TABLET ORAL at 17:11

## 2023-12-07 RX ADMIN — EMPAGLIFLOZIN 10 MG: 10 TABLET, FILM COATED ORAL at 09:37

## 2023-12-07 RX ADMIN — FLUTICASONE PROPIONATE 1 SPRAY: 50 SPRAY, METERED NASAL at 17:11

## 2023-12-07 RX ADMIN — LEVALBUTEROL HYDROCHLORIDE 1.25 MG: 1.25 SOLUTION RESPIRATORY (INHALATION) at 06:30

## 2023-12-07 RX ADMIN — LEVALBUTEROL HYDROCHLORIDE 1.25 MG: 1.25 SOLUTION RESPIRATORY (INHALATION) at 13:41

## 2023-12-07 RX ADMIN — METOPROLOL SUCCINATE 12.5 MG: 25 TABLET, EXTENDED RELEASE ORAL at 09:37

## 2023-12-07 RX ADMIN — METOPROLOL SUCCINATE 12.5 MG: 25 TABLET, EXTENDED RELEASE ORAL at 22:37

## 2023-12-07 RX ADMIN — FUROSEMIDE 20 MG: 20 TABLET ORAL at 09:37

## 2023-12-07 NOTE — ASSESSMENT & PLAN NOTE
Required up to 5 L nasal cannula supplemental oxygen to maintain saturation greater than 90%.   At baseline he uses 2 to 3 L  Currently on 3.5 L with nonlabored respirations at rest  Likely secondary to COPD exacerbation/COVID infection-please see treatment plan  Monitor oxygen requirements, wean as tolerated

## 2023-12-07 NOTE — ASSESSMENT & PLAN NOTE
Does not appear volume overloaded  TTE 8/18/2023 with EF 35% with moderately reduced systolic function  Continue Lasix 20 mg daily  Monitor daily weights and I&O's

## 2023-12-07 NOTE — RESPIRATORY THERAPY NOTE
12/06/23 8159   Respiratory Assessment   Resp Comments pt placed on own preset cpap with 4l   O2 Device cpap   Non-Invasive Information   O2 Interface Device Nasal mask   Non-Invasive Ventilation Mode CPAP   SpO2 93 %   $ Pulse Oximetry Spot Check Charge Completed   Non-Invasive Settings   FiO2 (%)   (with 4l)   PEEP/CPAP (cm H2O)   (presert)

## 2023-12-07 NOTE — PLAN OF CARE
Problem: Potential for Falls  Goal: Patient will remain free of falls  Description: INTERVENTIONS:  - Educate patient/family on patient safety including physical limitations  - Instruct patient to call for assistance with activity   - Consult OT/PT to assist with strengthening/mobility   - Keep Call bell within reach  - Keep bed low and locked with side rails adjusted as appropriate  - Keep care items and personal belongings within reach  - Initiate and maintain comfort rounds  - Make Fall Risk Sign visible to staff  - Offer Toileting in advance of need  - Initiate/Maintain bed alarm  - Obtain necessary fall risk management equipment  - Apply yellow socks and bracelet for high fall risk patients  - Consider moving patient to room near nurses station  Outcome: Progressing     Problem: INFECTION - ADULT  Goal: Absence or prevention of progression during hospitalization  Description: INTERVENTIONS:  - Assess and monitor for signs and symptoms of infection  - Monitor lab/diagnostic results  - Monitor all insertion sites, i.e. indwelling lines, tubes, and drains  - Monitor endotracheal if appropriate and nasal secretions for changes in amount and color  - [place] appropriate cooling/warming therapies per order  - Administer medications as ordered  - Instruct and encourage patient and family to use good hand hygiene technique  - Identify and instruct in appropriate isolation precautions for identified infection/condition  Outcome: Progressing     Problem: DISCHARGE PLANNING  Goal: Discharge to home or other facility with appropriate resources  Description: INTERVENTIONS:  - Identify barriers to discharge w/patient and caregiver  - Arrange for needed discharge resources and transportation as appropriate  - Identify discharge learning needs (meds, wound care, etc.)  - Refer to Case Management Department for coordinating discharge planning if the patient needs post-hospital services based on physician/advanced practitioner order or complex needs related to functional status, cognitive ability, or social support system  Outcome: Progressing     Problem: Knowledge Deficit  Goal: Patient/family/caregiver demonstrates understanding of disease process, treatment plan, medications, and discharge instructions  Description: Complete learning assessment and assess knowledge base.   Interventions:  - Provide teaching at level of understanding  - Provide teaching via preferred learning methods  Outcome: Progressing     Problem: RESPIRATORY - ADULT  Goal: Achieves optimal ventilation and oxygenation  Description: INTERVENTIONS:  - Assess for changes in respiratory status  - Assess for changes in mentation and behavior  - Position to facilitate oxygenation and minimize respiratory effort  - Oxygen administered by appropriate delivery if ordered  - Initiate smoking cessation education as indicated  - Encourage broncho-pulmonary hygiene including cough, deep breathe, Incentive Spirometry  - Assess the need for suctioning and aspirate as needed  - Assess and instruct to report SOB or any respiratory difficulty  - Respiratory Therapy support as indicated  Outcome: Progressing

## 2023-12-07 NOTE — ASSESSMENT & PLAN NOTE
COVID positive on 12/1/2023  S/p remdesivir IV x 5 days  Continue baricitinib  Continue heparin for therapeutic anticoagulation  Oxygen and respiratory protocol  Serial respiratory assessments

## 2023-12-07 NOTE — PLAN OF CARE
Problem: RESPIRATORY - ADULT  Goal: Achieves optimal ventilation and oxygenation  Description: INTERVENTIONS:  - Assess for changes in respiratory status  - Assess for changes in mentation and behavior  - Position to facilitate oxygenation and minimize respiratory effort  - Oxygen administered by appropriate delivery if ordered  - Initiate smoking cessation education as indicated  - Encourage broncho-pulmonary hygiene including cough, deep breathe, Incentive Spirometry  - Assess the need for suctioning and aspirate as needed  - Assess and instruct to report SOB or any respiratory difficulty  - Respiratory Therapy support as indicated  Outcome: Progressing   Pt states, "I feel like I'm not getting any better." Treatment plan explained to pt, respiratory at bedside.

## 2023-12-07 NOTE — ASSESSMENT & PLAN NOTE
With acute on chronic hypoxic respiratory failure and diffuse wheezing  Decadron per COVID pathway  Status post 7 days of IV ceftriaxone  Continue Mucinex  Continue respiratory protocol, nebulizers  Serial assessments

## 2023-12-08 ENCOUNTER — TELEPHONE (OUTPATIENT)
Dept: CARDIAC REHAB | Facility: HOSPITAL | Age: 78
End: 2023-12-08

## 2023-12-08 LAB
ANION GAP SERPL CALCULATED.3IONS-SCNC: 7 MMOL/L
BASOPHILS # BLD AUTO: 0.03 THOUSANDS/ÂΜL (ref 0–0.1)
BASOPHILS NFR BLD AUTO: 0 % (ref 0–1)
BUN SERPL-MCNC: 27 MG/DL (ref 5–25)
CALCIUM SERPL-MCNC: 8 MG/DL (ref 8.4–10.2)
CHLORIDE SERPL-SCNC: 101 MMOL/L (ref 96–108)
CO2 SERPL-SCNC: 33 MMOL/L (ref 21–32)
CREAT SERPL-MCNC: 1.02 MG/DL (ref 0.6–1.3)
EOSINOPHIL # BLD AUTO: 0 THOUSAND/ÂΜL (ref 0–0.61)
EOSINOPHIL NFR BLD AUTO: 0 % (ref 0–6)
ERYTHROCYTE [DISTWIDTH] IN BLOOD BY AUTOMATED COUNT: 16 % (ref 11.6–15.1)
GFR SERPL CREATININE-BSD FRML MDRD: 69 ML/MIN/1.73SQ M
GLUCOSE SERPL-MCNC: 95 MG/DL (ref 65–140)
HCT VFR BLD AUTO: 42.6 % (ref 36.5–49.3)
HGB BLD-MCNC: 13.4 G/DL (ref 12–17)
IMM GRANULOCYTES # BLD AUTO: 0.24 THOUSAND/UL (ref 0–0.2)
IMM GRANULOCYTES NFR BLD AUTO: 2 % (ref 0–2)
LYMPHOCYTES # BLD AUTO: 0.98 THOUSANDS/ÂΜL (ref 0.6–4.47)
LYMPHOCYTES NFR BLD AUTO: 7 % (ref 14–44)
MCH RBC QN AUTO: 31.5 PG (ref 26.8–34.3)
MCHC RBC AUTO-ENTMCNC: 31.5 G/DL (ref 31.4–37.4)
MCV RBC AUTO: 100 FL (ref 82–98)
MONOCYTES # BLD AUTO: 1.21 THOUSAND/ÂΜL (ref 0.17–1.22)
MONOCYTES NFR BLD AUTO: 9 % (ref 4–12)
NEUTROPHILS # BLD AUTO: 11.3 THOUSANDS/ÂΜL (ref 1.85–7.62)
NEUTS SEG NFR BLD AUTO: 82 % (ref 43–75)
NRBC BLD AUTO-RTO: 0 /100 WBCS
PLATELET # BLD AUTO: 266 THOUSANDS/UL (ref 149–390)
PMV BLD AUTO: 10.9 FL (ref 8.9–12.7)
POTASSIUM SERPL-SCNC: 4.1 MMOL/L (ref 3.5–5.3)
RBC # BLD AUTO: 4.25 MILLION/UL (ref 3.88–5.62)
SODIUM SERPL-SCNC: 141 MMOL/L (ref 135–147)
WBC # BLD AUTO: 13.76 THOUSAND/UL (ref 4.31–10.16)

## 2023-12-08 PROCEDURE — 94760 N-INVAS EAR/PLS OXIMETRY 1: CPT

## 2023-12-08 PROCEDURE — 94640 AIRWAY INHALATION TREATMENT: CPT

## 2023-12-08 PROCEDURE — 99233 SBSQ HOSP IP/OBS HIGH 50: CPT | Performed by: NURSE PRACTITIONER

## 2023-12-08 PROCEDURE — 97163 PT EVAL HIGH COMPLEX 45 MIN: CPT

## 2023-12-08 PROCEDURE — 85025 COMPLETE CBC W/AUTO DIFF WBC: CPT

## 2023-12-08 PROCEDURE — 80048 BASIC METABOLIC PNL TOTAL CA: CPT

## 2023-12-08 RX ORDER — GUAIFENESIN 600 MG/1
600 TABLET, EXTENDED RELEASE ORAL 2 TIMES DAILY
Qty: 60 TABLET | Refills: 0 | Status: SHIPPED | OUTPATIENT
Start: 2023-12-08 | End: 2024-01-07

## 2023-12-08 RX ORDER — FLUTICASONE PROPIONATE 50 MCG
1 SPRAY, SUSPENSION (ML) NASAL 2 TIMES DAILY
Qty: 9.9 ML | Refills: 0 | Status: SHIPPED | OUTPATIENT
Start: 2023-12-08 | End: 2024-02-22 | Stop reason: SDUPTHER

## 2023-12-08 RX ORDER — ECHINACEA PURPUREA EXTRACT 125 MG
1 TABLET ORAL
Qty: 30 ML | Refills: 0 | Status: SHIPPED | OUTPATIENT
Start: 2023-12-08 | End: 2024-02-22 | Stop reason: SDUPTHER

## 2023-12-08 RX ADMIN — LEVALBUTEROL HYDROCHLORIDE 1.25 MG: 1.25 SOLUTION RESPIRATORY (INHALATION) at 07:16

## 2023-12-08 RX ADMIN — GABAPENTIN 300 MG: 300 CAPSULE ORAL at 22:04

## 2023-12-08 RX ADMIN — HEPARIN SODIUM 5000 UNITS: 5000 INJECTION INTRAVENOUS; SUBCUTANEOUS at 15:04

## 2023-12-08 RX ADMIN — GUAIFENESIN 600 MG: 600 TABLET ORAL at 08:31

## 2023-12-08 RX ADMIN — ASPIRIN 81 MG CHEWABLE TABLET 81 MG: 81 TABLET CHEWABLE at 08:31

## 2023-12-08 RX ADMIN — ATORVASTATIN CALCIUM 80 MG: 80 TABLET, FILM COATED ORAL at 17:15

## 2023-12-08 RX ADMIN — FAMOTIDINE 20 MG: 20 TABLET ORAL at 22:03

## 2023-12-08 RX ADMIN — DOCUSATE SODIUM 100 MG: 100 CAPSULE, LIQUID FILLED ORAL at 08:31

## 2023-12-08 RX ADMIN — ALPRAZOLAM 0.25 MG: 0.25 TABLET ORAL at 22:12

## 2023-12-08 RX ADMIN — GUAIFENESIN 600 MG: 600 TABLET ORAL at 17:15

## 2023-12-08 RX ADMIN — HEPARIN SODIUM 5000 UNITS: 5000 INJECTION INTRAVENOUS; SUBCUTANEOUS at 05:42

## 2023-12-08 RX ADMIN — FLUTICASONE PROPIONATE 1 SPRAY: 50 SPRAY, METERED NASAL at 17:15

## 2023-12-08 RX ADMIN — DEXAMETHASONE SODIUM PHOSPHATE 6 MG: 10 INJECTION, SOLUTION INTRAMUSCULAR; INTRAVENOUS at 11:31

## 2023-12-08 RX ADMIN — METOPROLOL SUCCINATE 12.5 MG: 25 TABLET, EXTENDED RELEASE ORAL at 21:00

## 2023-12-08 RX ADMIN — BARICITINIB 4 MG: 2 TABLET, FILM COATED ORAL at 11:32

## 2023-12-08 RX ADMIN — HEPARIN SODIUM 5000 UNITS: 5000 INJECTION INTRAVENOUS; SUBCUTANEOUS at 22:04

## 2023-12-08 RX ADMIN — FORMOTEROL FUMARATE DIHYDRATE 20 MCG: 20 SOLUTION RESPIRATORY (INHALATION) at 07:44

## 2023-12-08 RX ADMIN — BUDESONIDE 0.5 MG: 0.5 INHALANT ORAL at 19:36

## 2023-12-08 RX ADMIN — BUDESONIDE 0.5 MG: 0.5 INHALANT ORAL at 07:16

## 2023-12-08 RX ADMIN — FLUTICASONE PROPIONATE 1 SPRAY: 50 SPRAY, METERED NASAL at 08:32

## 2023-12-08 RX ADMIN — LEVALBUTEROL HYDROCHLORIDE 1.25 MG: 1.25 SOLUTION RESPIRATORY (INHALATION) at 19:34

## 2023-12-08 RX ADMIN — LEVALBUTEROL HYDROCHLORIDE 1.25 MG: 1.25 SOLUTION RESPIRATORY (INHALATION) at 13:35

## 2023-12-08 RX ADMIN — EMPAGLIFLOZIN 10 MG: 10 TABLET, FILM COATED ORAL at 08:31

## 2023-12-08 RX ADMIN — PANTOPRAZOLE SODIUM 40 MG: 40 TABLET, DELAYED RELEASE ORAL at 05:42

## 2023-12-08 RX ADMIN — FORMOTEROL FUMARATE DIHYDRATE 20 MCG: 20 SOLUTION RESPIRATORY (INHALATION) at 19:39

## 2023-12-08 NOTE — PHYSICAL THERAPY NOTE
Physical Therapy Evaluation   Time in: 1008  Time out: 1026  Total evaluation time: 18 minutes    Patient's Name: Isadora Miller    Admitting Diagnosis  Shortness of breath [R06.02]  COPD exacerbation ([address]) [J44.1]    Problem List  Patient Active Problem List   Diagnosis    Hyperlipidemia    Coronary artery disease involving native coronary artery of native heart without angina pectoris    Left carotid artery occlusion    COPD with acute exacerbation (HCC)    Oxygen dependent    Depression, recurrent (HCC)    S/P carotid endarterectomy    Stented coronary artery    Vertigo    Anisometropia    Osteoarthritis of spinal facet joint    Chronic ischemic heart disease    Diverticular disease of colon    Dry eye syndrome    GERD (gastroesophageal reflux disease)    Acute hearing loss, right    Elevated PSA    Hypoxia    Lens replaced by other means    Lumbar radiculopathy    Multinodular goiter    Nuclear senile cataract    Obesity    Occlusion and stenosis of carotid artery    Osteoarthritis of hip    Prediabetes    LAMBERTO on CPAP    Solitary pulmonary nodule    Thyroid nodule    Guyon syndrome, left    Costochondral chest pain    Abnormal nuclear stress test    Right hip pain    Primary insomnia    Chronic right-sided thoracic back pain    Hoarseness or changing voice    Chronic bilateral low back pain with right-sided sciatica    Mid back pain    Thoracic radiculopathy    Chronic pain syndrome    Urinary frequency    Incomplete bladder emptying    Intercostal neuralgia    Cubital tunnel syndrome on left    Carpal tunnel syndrome on left    Elevated MCV    Prostate cancer ([address])    Chronic respiratory failure with hypoxia (HCC)    Sensorineural hearing loss, bilateral    COVID-19    Ambulatory dysfunction    COPD (chronic obstructive pulmonary disease) (HCC)    Hypokalemia    Left leg pain    Transient right leg weakness    Leukocytosis    Pneumonia of right lower lobe due to infectious organism    Orthopnea    Lower extremity edema    Irregular heart rhythm    Elevated troponin    Intracranial aneurysm    Congestive heart failure with left ventricular systolic dysfunction (LVSD) ([facility])    Hypoxemia    Dysphagia    Erectile dysfunction    Allergic rhinitis, unspecified    Chronic kidney disease, stage 3a (HCC)    Generalized muscle weakness    Hypertensive heart and chronic kidney disease with heart failure and stage 1 through stage 4 chronic kidney disease, or unspecified chronic kidney disease ([address])    Need for assistance with personal care    Other abnormalities of gait and mobility    Retention of urine, unspecified    Sudden idiopathic hearing loss, right ear    Acute and chronic respiratory failure with hypoxia ([address])    Other disorders of refraction    Occlusion and stenosis of unspecified carotid artery    Chronic systolic congestive heart failure ([address])       Past Medical History  Past Medical History:   Diagnosis Date    Abnormal ECG 2021 OR 2022    Alcoholism ([address]) 1984    sober 38 years    Arthritis 2008    beginning    Bilateral carotid artery stenosis     Callus     Cancer ([facility])     skin    Chronic diastolic heart failure ([facility])     Chronic ischemic heart disease     Chronic kidney disease     stage 3    Colon polyp     COPD (chronic obstructive pulmonary disease) ([facility])     Coronary artery disease     hx stents, MI, PCI    COVID 11/2021    CPAP (continuous positive airway pressure) dependence     Disease of thyroid gland 2017    nodules    Emphysema of lung ([address]) 1995    started    Hearing loss     History of transfusion 1995    Hyperlipidemia     Hypertension     Intracranial aneurysm 04/25/2023    Lung nodule 2023    x rays    MI (myocardial infarction) ([address]) 1995    Myocardial infarction ([address]) 1995    Pneumonia     Pneumothorax 02/20/2023    collapsed lung    Prostate cancer ([address])     RSV (respiratory syncytial virus infection) 10/2022    S/P carotid endarterectomy     Shortness of breath     O2 2 l/nc PRN    Sleep apnea     Sleep apnea, obstructive     Stented coronary artery        Past Surgical History  Past Surgical History:   Procedure Laterality Date    APPENDECTOMY      CARDIAC CATHETERIZATION  03/18/2021    left main with no significant disease, proximal LAD with 10% stenosis at the site of prior stent, left circumflex artery with minimal luminal irregularities mid RCA with 50% stenosis at site of prior stent and PL segment with distal disease supplied by collaterals from the distal circumflex with no significant CAD requiring revascularization at that time. CATARACT EXTRACTION, BILATERAL Bilateral     COLONOSCOPY      CORONARY ANGIOPLASTY WITH STENT PLACEMENT  1999    RCA    CORONARY ANGIOPLASTY WITH STENT PLACEMENT  2001    RCA    CORONARY ANGIOPLASTY WITH STENT PLACEMENT  2003    LAD    EYE SURGERY      DC BX PROSTATE STRTCTC SATURATION SAMPLING IMG GID N/A 09/13/2022    Procedure: TRANSPERINEAL MRI FUSION  BIOPSY PROSTATE;  Surgeon: Lindsay Menon MD;  [facility]: [facility];  Service: Urology    DC NEUROPLASTY &/TRANSPOS MEDIAN NRV CARPAL Frannie Cramp Left 07/22/2022    Procedure: RELEASE CARPAL TUNNEL;  Surgeon: Merlin Side, MD;  Location[facility];  Service: Orthopedics    DC NEUROPLASTY &/TRANSPOSITION ULNAR NERVE ELBOW Left 07/22/2022    Procedure: RELEASE CUBITAL TUNNEL;  Surgeon: Merlin Side, MD;  [facility];  Service: Orthopedics    DC NEUROPLASTY &/TRANSPOSITION ULNAR NERVE WRIST Left 07/22/2022    Procedure: Orren Castles RELEASE;  Surgeon: Merlin Side, MD;  [facility];  Service: Orthopedics    SKIN CANCER EXCISION  2012    chin-per pt, basal cell  also right ankle    TONSILLECTOMY  no       PT performed at least 2 patient identifiers during session: Name and wristband.        12/08/23 1026   PT Last Visit   PT Visit Date 12/08/23   Note Type   Note type Evaluation   Pain Assessment   Pain Assessment Tool 0-10   Pain Score No Pain   Patient's Stated Pain Goal No pain Multiple Pain Sites No   Restrictions/Precautions   Weight Bearing Precautions Per Order No   Other Precautions Contact/isolation; Airborne/isolation; Chair Alarm;O2;Multiple lines; Fall Risk;Pain;Hard of hearing  (3.5 L NC and wears 3L-4L NC at baseline, pt reports he is death but does not have his hearing aids because they are broken)   Home Living   Type of [address] [place] [address] Dr One level;Performs ADLs on one level; Able to live on main level with bedroom/bathroom;Stairs to enter with rails  (3 SATNAM)   Bathroom Shower/Tub Walk-in shower   Bathroom Toilet Standard   Bathroom Equipment Commode   Bathroom Accessibility Accessible   Home Equipment Walker  (pt reports 2 RW but does not use them at baseline)   Additional Comments Subjective intake was found per chart review   Prior Function   Level of [place] Independent with ADLs; Needs assistance with IADLS; Independent with IADLS   Lives With Spouse; Family   Receives Help From Family   IADLs Independent with driving; Independent with meal prep; Independent with medication management   Falls in the last 6 months 0   Vocational Retired   Comments Subjective intake was found per chart review   General   Family/Caregiver Present No   Cognition   Overall Cognitive Status WFL   Arousal/Participation Alert   Orientation Level Oriented X4   Memory Within functional limits   Following Commands Follows one step commands without difficulty   Subjective   Subjective "I do not walk to walk too much because it takes me a while to recover my breathing afterwards"   RLE Assessment   RLE Assessment WFL   LLE Assessment   LLE Assessment WFL   Bed Mobility   Additional Comments Pt was in bedsdie recliner upon start of session and was left in recliner at end of session   Transfers   Sit to Stand 5  Supervision   Additional items Increased time required;Armrests   Stand to Sit 5  Supervision   Additional items Assist x 1; Increased time required;Armrests   Stand pivot 5  Supervision Additional items Increased time required;Verbal cues   Additional Comments no AD used with transfers  (no drop in SPO2 was noted upon ambulation but RUI was giving inaccurate reading. Once seated back in bedside chair pt's SPO2 did drop to 88% while collecting subjective intake. Pt remaine >90% by end of evaluation.)   Ambulation/Elevation   Gait pattern Improper Weight shift;Decreased foot clearance; Short stride; Step through pattern   Gait Assistance 5  Supervision   Additional items Assist x 1;Verbal cues   Assistive Device   (No AD used but pt did grab onto the bed and bedside table while walking)   Distance 12 ft   Stair Management Assistance Not tested   Ambulation/Elevation Additional Comments Pt stated that he only wanted to ambulate a short distance because when he gets SOB, it takes him 5-10 minutes to recover. 2nd ambulation was attempted by pt refused due to decreased activity tolerance and that he was comfortable in his bedside chair. Balance   Static Sitting Good   Dynamic Sitting Good   Static Standing Fair   Dynamic Standing Fair   Ambulatory Fair   Endurance Deficit   Endurance Deficit Yes   Endurance Deficit Description SOB/CHACON   Activity Tolerance   Activity Tolerance Patient limited by fatigue   Medical Staff Made Aware Yes, CM made aware of session outcomes   Nurse Made Aware Yes, RN Laquita Basurto was made aware of session outcomes   Assessment   Prognosis Fair   Problem List Decreased endurance; Impaired balance;Decreased mobility; Impaired hearing   Assessment Pt is a 77 y.o. male seen for PT evaluation s/p admit to [address] [facility] w/ COPD with acute exacerbation. PTA, pt was independent w/ all functional mobility and ADL's but required assistance for IADL's. Pt reports having 2 RW at home but does not use them at baseline for mobility. PT consulted to assess pt's functional mobility and d/c needs. At time of PT eval, pt found supine in bed and was agreeable to PT session.  Pt was currently on 3.5L NC supplemental oxygen and reports using 3-4L at baseline at home. Pt performed STS transfer, stand pivot transfer and ambulation with SUP assist x1. No drop SPO2 noted upon OOB activity but RUI may have been giving an inaccurate reading. SPO2 did drop to 88% during subjective intake but remained >90% at end of session. Pt was able to ambulate 12 ft with no AD with a decreased benny and some slight CHACON. While ambulating pt held onto the bedside table and bed and stated that he felt deconditioned. 2nd ambulation was attempted but pt refused because he stated that it takes him 5-10 minutes to recover his breathing post-ambulation and that he did  not want to do that. Based on evaluation, pt mobility and OOB tolerance is limited due to deconditioning and CHACON. Pt currently functioning below baseline level and will continue to benefit from immediate acute skilled PT services in order to address the deficits listed above. Areas for improvement include: decreased endurance, decreased mobility and impaired balance. From PT/mobility standpoint, at this time recommendation is a moderate resource intensity, in order to maximize pt's functional independence and safety/consistency w/ mobility. Goals   Patient Goals to go home   STG Expiration Date 12/18/23   Short Term Goal #1 In 7-10 days, the pt will ambulate independently >50 ft in order to increase endurance tolerance and return to PLOF. Pt will tolerate 10 minutes OOB activity in order to increase overall functional activity tolerance. Pt will increase balance score by 1/2 grade in order to decrease fall risk. Pt will perform transfer independently in order to safely maneuver his home. Pt will navigate 3 SATNAM MOD I in order to safely enter/exit his home. PT Treatment Day 0   Plan   Treatment/Interventions Elevations; Therapeutic exercise; Endurance training;Patient/family training;Gait training;Spoke to case management;Spoke to nursing   PT Frequency 2-3x/wk   Discharge Recommendation   Rehab Resource Intensity Level, PT II (Moderate Resource Intensity)   AM-PAC Basic Mobility Inpatient   Turning in Flat Bed Without Bedrails 4   Lying on Back to Sitting on Edge of Flat Bed Without Bedrails 4   Moving Bed to Chair 3   Standing Up From Chair Using Arms 4   Walk in Room 3   Climb 3-5 Stairs With Railing 3   Basic Mobility Inpatient Raw Score 21   Basic Mobility Standardized Score 45.55   Highest Level Of Mobility   JH-HLM Goal 6: Walk 10 steps or more   JH-HLM Achieved 6: Walk 10 steps or more   End of Consult   Patient Position at End of Consult Bedside chair;Bed/Chair alarm activated; All needs within reach       [facility]

## 2023-12-08 NOTE — TELEPHONE ENCOUNTER
[facility] staff spoke with patient's daughter regarding the status of Shlomo's pulmonary rehab. He is being discharged from [facility] at this time due to hospitalization with a plan for home health following his discharge. Daughter was agreeable with this plan and was instructed to reach out to Mauricio in the future with any questions, comments, or concerns that we may assist with.

## 2023-12-08 NOTE — PLAN OF CARE
Problem: Potential for Falls  Goal: Patient will remain free of falls  Description: INTERVENTIONS:  - Educate patient/family on patient safety including physical limitations  - Instruct patient to call for assistance with activity   - Consult OT/PT to assist with strengthening/mobility   - Keep Call bell within reach  - Keep bed low and locked with side rails adjusted as appropriate  - Keep care items and personal belongings within reach  - Initiate and maintain comfort rounds  - Make Fall Risk Sign visible to staff  - Offer Toileting in advance of need  - Initiate/Maintain bed alarm  - Obtain necessary fall risk management equipment  - Apply yellow socks and bracelet for high fall risk patients  - Consider moving patient to room near nurses station  Outcome: Progressing     Problem: INFECTION - ADULT  Goal: Absence or prevention of progression during hospitalization  Description: INTERVENTIONS:  - Assess and monitor for signs and symptoms of infection  - Monitor lab/diagnostic results  - Monitor all insertion sites, i.e. indwelling lines, tubes, and drains  - Monitor endotracheal if appropriate and nasal secretions for changes in amount and color  - Porter appropriate cooling/warming therapies per order  - Administer medications as ordered  - Instruct and encourage patient and family to use good hand hygiene technique  - Identify and instruct in appropriate isolation precautions for identified infection/condition  Outcome: Progressing     Problem: DISCHARGE PLANNING  Goal: Discharge to home or other facility with appropriate resources  Description: INTERVENTIONS:  - Identify barriers to discharge w/patient and caregiver  - Arrange for needed discharge resources and transportation as appropriate  - Identify discharge learning needs (meds, wound care, etc.)  - Refer to Case Management Department for coordinating discharge planning if the patient needs post-hospital services based on physician/advanced practitioner order or complex needs related to functional status, cognitive ability, or social support system  Outcome: Progressing     Problem: Knowledge Deficit  Goal: Patient/family/caregiver demonstrates understanding of disease process, treatment plan, medications, and discharge instructions  Description: Complete learning assessment and assess knowledge base.   Interventions:  - Provide teaching at level of understanding  - Provide teaching via preferred learning methods  Outcome: Progressing     Problem: RESPIRATORY - ADULT  Goal: Achieves optimal ventilation and oxygenation  Description: INTERVENTIONS:  - Assess for changes in respiratory status  - Assess for changes in mentation and behavior  - Position to facilitate oxygenation and minimize respiratory effort  - Oxygen administered by appropriate delivery if ordered  - Initiate smoking cessation education as indicated  - Encourage broncho-pulmonary hygiene including cough, deep breathe, Incentive Spirometry  - Assess the need for suctioning and aspirate as needed  - Assess and instruct to report SOB or any respiratory difficulty  - Respiratory Therapy support as indicated  Outcome: Progressing

## 2023-12-08 NOTE — CASE MANAGEMENT
Case Management Discharge Planning Note    Patient name Paula Bermudez  Location /-99 MRN [id number]  : 1945 Date 2023       Current Admission Date: 2023  Current Admission Diagnosis:COPD with acute exacerbation [facility])   Patient Active Problem List    Diagnosis Date Noted    Chronic systolic congestive heart failure ([address]) 10/22/2023    Erectile dysfunction 2023    Other disorders of refraction 2023    Occlusion and stenosis of unspecified carotid artery 2023    Dysphagia 2023    Congestive heart failure with left ventricular systolic dysfunction (LVSD) ([address]) 2023    Hypoxemia 2023    Intracranial aneurysm 2023    Allergic rhinitis, unspecified 2023    Chronic kidney disease, stage 3a ([address]) 2023    Generalized muscle weakness 2023    Hypertensive heart and chronic kidney disease with heart failure and stage 1 through stage 4 chronic kidney disease, or unspecified chronic kidney disease ([address]) 2023    Need for assistance with personal care 2023    Other abnormalities of gait and mobility 2023    Retention of urine, unspecified 2023    Sudden idiopathic hearing loss, right ear 2023    Acute and chronic respiratory failure with hypoxia ([address]) 2023    Elevated troponin 2023    Orthopnea 2023    Lower extremity edema 2023    Irregular heart rhythm 2023    Pneumonia of right lower lobe due to infectious organism 03/15/2023    Leukocytosis 2023    Left leg pain 2023    Transient right leg weakness 2023    Hypokalemia 2023    COVID-19 2023    Ambulatory dysfunction 2023    COPD (chronic obstructive pulmonary disease) ([address]) 2023    Sensorineural hearing loss, bilateral 10/19/2022    Chronic respiratory failure with hypoxia ([address]) 10/15/2022    Prostate cancer ([address]) 2022    Elevated MCV 2022    Cubital tunnel syndrome on left 07/22/2022    Carpal tunnel syndrome on left 07/22/2022    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    Urinary frequency 04/27/2022    Incomplete bladder emptying 04/27/2022    Chronic bilateral low back pain with right-sided sciatica 04/18/2022    Mid back pain 04/18/2022    Thoracic radiculopathy 04/18/2022    Chronic pain syndrome 04/18/2022    Hoarseness or changing voice 04/14/2022    Chronic right-sided thoracic back pain 03/05/2022    Primary insomnia 03/02/2022    Right hip pain 01/20/2022    Abnormal nuclear stress test 03/18/2021    Costochondral chest pain 01/15/2021    COPD with acute exacerbation ([address]) 01/11/2021    Oxygen dependent 01/11/2021    S/P carotid endarterectomy 01/11/2021    Stented coronary artery 01/11/2021    Vertigo 01/11/2021    Anisometropia 01/11/2021    Osteoarthritis of spinal facet joint 01/11/2021    Chronic ischemic heart disease 01/11/2021    Diverticular disease of colon 01/11/2021    Dry eye syndrome 01/11/2021    GERD (gastroesophageal reflux disease) 01/11/2021    Acute hearing loss, right 01/11/2021    Elevated PSA 01/11/2021    Hypoxia 01/11/2021    Lens replaced by other means 01/11/2021    Lumbar radiculopathy 01/11/2021    Multinodular goiter 01/11/2021    Nuclear senile cataract 01/11/2021    Obesity 01/11/2021    Occlusion and stenosis of carotid artery 01/11/2021    Osteoarthritis of hip 01/11/2021    Prediabetes 01/11/2021    LAMBERTO on CPAP 01/11/2021    Solitary pulmonary nodule 01/11/2021    Thyroid nodule 01/11/2021    Guyon syndrome, 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    Depression, recurrent ([address])     Hyperlipidemia     Coronary artery disease involving native coronary artery of native heart without angina pectoris     Left carotid artery occlusion       LOS (days): 6  Geometric Mean LOS (GMLOS) (days): 5.00  Days to GMLOS:-1.1     OBJECTIVE:  Risk of Unplanned Readmission Score: 52.61         Current admission status: Inpatient   Preferred Pharmacy:   [email or web address] [address], [address]  [address] [facility].  [address] [id number]  Phone: [phone] Fax: [phone] [address] [facility], [address] [id number] [facility]  Phone: [phone] Fax: [phone]    Primary Care Provider: Gloria Dudley DO    Primary Insurance: [facility] REP  Secondary Insurance:     DISCHARGE DETAILS:    Discharge planning discussed with[de-identified] cm randi Bro at 8:58 am LM & 12:22pm spoke wiht her & 15:13 pm LM  Freedom of Choice: Yes  Comments - Freedom of Choice: pt accepted by Joanna Pruitt family and pt's choice  CM contacted family/caregiver?: Yes             Contacts  Patient Contacts: Willie Ribera  Relationship to Patient[de-identified] Family  Phone Number: [phone]  Reason/Outcome: Discharge [facility]         Is the patient interested in [facility] at discharge?: Yes         Other Referral/Resources/Interventions Provided:  Interventions: [facility]  Referral Comments: pt acetped by s;vna- pt has oxygen 3 liters at home    Would you like to participate in our [address] [place] [address] service program?  : No - Declined    Treatment Team Recommendation: Home with [address] (home wiht family & slvna & outpt follow up- family will bring his home oxygen for transport)                                   IMM Given (Date):: 12/08/23  IMM Given to[de-identified] Family (called daughter & reviewed over the phone - she stated she understood & copy of IMM  was mailed)

## 2023-12-08 NOTE — PROGRESS NOTES
[address]  Progress Note  Name: Sebastien Salvador  MRN: [id number]  Unit/Bed#: -01 I Date of Admission: 12/1/2023   Date of Service: 12/8/2023 I Hospital Day: 6    Assessment/Plan     Acute and chronic respiratory failure with hypoxia [facility])  Assessment & Plan  Required up to 5 L nasal cannula supplemental oxygen to maintain saturation greater than 90%. At baseline he uses 2 to 3 L  Currently on 3.5 L with nonlabored respirations at rest  Likely secondary to COPD exacerbation/COVID infection-please see treatment plan  Monitor oxygen requirements, wean as tolerated    * COPD with acute exacerbation ([address])  Assessment & Plan  With acute on chronic hypoxic respiratory failure and diffuse wheezing  Decadron per COVID pathway  Status post 7 days of IV ceftriaxone  Continue Mucinex  Continue respiratory protocol, nebulizers  Serial assessments    COVID-19  Assessment & Plan  COVID positive on 12/1/2023  S/p remdesivir IV x 5 days  Continue baricitinib  Continue heparin for therapeutic anticoagulation  Oxygen and respiratory protocol  Serial respiratory assessments    Chronic systolic congestive heart failure (HCC)  Assessment & Plan  Does not appear volume overloaded  TTE 8/18/2023 with EF 35% with moderately reduced systolic function  Continue Lasix 20 mg daily  Monitor daily weights and I&O's    LAMBERTO on CPAP  Assessment & Plan  Continue CPAP at bedtime    GERD (gastroesophageal reflux disease)  Assessment & Plan  Continue home famotidine and pantoprazole    Hyperlipidemia  Assessment & Plan  Continue statin           VTE Pharmacologic Prophylaxis: VTE Score: 6 High Risk (Score >/= 5) - Pharmacological DVT Prophylaxis Ordered: heparin. Sequential Compression Devices Ordered. Mobility:   Basic Mobility Inpatient Raw Score: 21  JH-HLM Goal: 6: Walk 10 steps or more  JH-HLM Achieved: 6: Walk 10 steps or more  HLM Goal achieved. Continue to encourage appropriate mobility.     Patient Centered Rounds: I performed bedside rounds with nursing staff today. Discussions with Specialists or Other Care Team Provider: FLY    Education and Discussions with Family / Patient: Patient declined call to . Total Time Spent on Date of Encounter in care of patient: 60 mins. This time was spent on one or more of the following: performing physical exam; counseling and coordination of care; obtaining or reviewing history; documenting in the medical record; reviewing/ordering tests, medications or procedures; communicating with other healthcare professionals and discussing with patient's family/caregivers. Current Length of Stay: 6 day(s)  Current Patient Status: Inpatient   Certification Statement: The patient will continue to require additional inpatient hospital stay due to acute on chronic hypoxic respiratory failure, COPD exacerbation, COVID infection. Discharge Plan: Anticipate discharge tomorrow to home with home services. Code Status: Level 3 - DNAR and DNI    Subjective:   Patient seen and examined. He said he is breathing easier, but still wheezing. He also says he feels very weak. Denies fever, chills, chest pain, abdominal pain, vomiting, diarrhea, or dizziness. Objective:     Vitals:   Temp (24hrs), Av.3 °F (36.3 °C), Min:97 °F (36.1 °C), Max:97.5 °F (36.4 °C)    Temp:  [97 °F (36.1 °C)-97.5 °F (36.4 °C)] 97.3 °F (36.3 °C)  HR:  [] 95  Resp:  [18-20] 18  BP: ()/(58-73) 116/73  SpO2:  [90 %-99 %] 96 %  Body mass index is 25.17 kg/m². Input and Output Summary (last 24 hours): Intake/Output Summary (Last 24 hours) at 2023 1524  Last data filed at 2023 1300  Gross per 24 hour   Intake 840 ml   Output 1000 ml   Net -160 ml       Physical Exam:   Physical Exam  Vitals and nursing note reviewed. Constitutional:       General: He is not in acute distress. HENT:      Head: Normocephalic and atraumatic.       Nose: Nose normal.      Mouth/Throat: Mouth: Mucous membranes are moist.      Pharynx: Oropharynx is clear. Eyes:      Pupils: Pupils are equal, round, and reactive to light. Cardiovascular:      Rate and Rhythm: Normal rate and regular rhythm. Pulmonary:      Effort: Pulmonary effort is normal. No respiratory distress. Breath sounds: Wheezing present. Abdominal:      General: Bowel sounds are normal.      Palpations: Abdomen is soft. Tenderness: There is no abdominal tenderness. Musculoskeletal:      Cervical back: Neck supple. Right lower leg: No edema. Left lower leg: No edema. Skin:     General: Skin is warm and dry. Capillary Refill: Capillary refill takes less than 2 seconds. Neurological:      General: No focal deficit present. Mental Status: He is alert. Additional Data:     Labs:  Results from last 7 days   Lab Units 12/08/23  0552   WBC Thousand/uL 13.76*   HEMOGLOBIN g/dL 13.4   HEMATOCRIT % 42.6   PLATELETS Thousands/uL 266   NEUTROS PCT % 82*   LYMPHS PCT % 7*   MONOS PCT % 9   EOS PCT % 0     Results from last 7 days   Lab Units 12/08/23  0552 12/06/23  0455 12/05/23  0453   SODIUM mmol/L 141   < > 140   POTASSIUM mmol/L 4.1   < > 4.0   CHLORIDE mmol/L 101   < > 100   CO2 mmol/L 33*   < > 33*   BUN mg/dL 27*   < > 31*   CREATININE mg/dL 1.02   < > 1.15   ANION GAP mmol/L 7   < > 7   CALCIUM mg/dL 8.0*   < > 8.1*   ALBUMIN g/dL  --   --  3.4*   TOTAL BILIRUBIN mg/dL  --   --  0.60   ALK PHOS U/L  --   --  52   ALT U/L  --   --  58*   AST U/L  --   --  34   GLUCOSE RANDOM mg/dL 95   < > 103    < > = values in this interval not displayed.                    Results from last 7 days   Lab Units 12/04/23  0517 12/02/23  0450 12/01/23  1801   LACTIC ACID mmol/L  --   --  1.4   PROCALCITONIN ng/ml 0.07 0.08  --        Lines/Drains:  Invasive Devices       Peripheral Intravenous Line  Duration             Peripheral IV 12/04/23 Distal;Left;Upper;Ventral (anterior) Arm 3 days Recent Cultures (last 7 days):           Last 24 Hours Medication List:   Current Facility-Administered Medications   Medication Dose Route Frequency Provider Last Rate    acetaminophen  650 mg Oral Q6H PRN Flonnie Corsica, DO      albuterol  2 puff Inhalation Q4H PRN Cande Dooley, DO      ALPRAZolam  0.25 mg Oral HS PRN Flonnie Corsica, DO      aspirin  81 mg Oral Daily Floie Corsica, DO      atorvastatin  80 mg Oral Daily With [employer], DO      baricitinib  4 mg Oral Q24H Brooks Patten, DO      budesonide  0.5 mg Nebulization Q12H INOCENTE Sutherland      dexamethasone  6 mg Intravenous Q24H [facility]      docusate sodium  100 mg Oral Daily INOCENTE Sutherland      Empagliflozin  10 mg Oral QAM [facility], [facility]      famotidine  20 mg Oral HS Brooks Patten, DO      fluticasone  1 spray Each Nare BID INOCENTE Sutherland      formoterol  20 mcg Nebulization BID Cande Dooley, DO      furosemide  20 mg Oral Daily [facility], DO      gabapentin  300 mg Oral HS [facility], DO      guaiFENesin  600 mg Oral BID Floie Corsica, DO      heparin (porcine)  5,000 Units Subcutaneous [facility], DO      levalbuterol  1.25 mg Nebulization TID INOCENTE Sutherland      methocarbamol  500 mg Oral TID PRN [facility], DO      metoprolol succinate  12.5 mg Oral BID Floie Corsica, DO      pantoprazole  40 mg Oral Early Morning Floie Corsica, DO      sodium chloride  1 spray Each Nare Q1H PRN Shae Coleman PA-C          Today, Patient Was Seen By: INOCENTE Perdomo    **Please Note: This note may have been constructed using a voice recognition system. **

## 2023-12-08 NOTE — PLAN OF CARE
Problem: Potential for Falls  Goal: Patient will remain free of falls  Description: INTERVENTIONS:  - Educate patient/family on patient safety including physical limitations  - Instruct patient to call for assistance with activity   - Consult OT/PT to assist with strengthening/mobility   - Keep Call bell within reach  - Keep bed low and locked with side rails adjusted as appropriate  - Keep care items and personal belongings within reach  - Initiate and maintain comfort rounds  - Make Fall Risk Sign visible to staff  - Offer Toileting in advance of need  - Initiate/Maintain bed alarm  - Obtain necessary fall risk management equipment  - Apply yellow socks and bracelet for high fall risk patients  - Consider moving patient to room near nurses station  Outcome: Progressing     Problem: INFECTION - ADULT  Goal: Absence or prevention of progression during hospitalization  Description: INTERVENTIONS:  - Assess and monitor for signs and symptoms of infection  - Monitor lab/diagnostic results  - Monitor all insertion sites, i.e. indwelling lines, tubes, and drains  - Monitor endotracheal if appropriate and nasal secretions for changes in amount and color  - [place] appropriate cooling/warming therapies per order  - Administer medications as ordered  - Instruct and encourage patient and family to use good hand hygiene technique  - Identify and instruct in appropriate isolation precautions for identified infection/condition  Outcome: Progressing     Problem: DISCHARGE PLANNING  Goal: Discharge to home or other facility with appropriate resources  Description: INTERVENTIONS:  - Identify barriers to discharge w/patient and caregiver  - Arrange for needed discharge resources and transportation as appropriate  - Identify discharge learning needs (meds, wound care, etc.)  - Refer to Case Management Department for coordinating discharge planning if the patient needs post-hospital services based on physician/advanced practitioner order or complex needs related to functional status, cognitive ability, or social support system  Outcome: Progressing     Problem: Knowledge Deficit  Goal: Patient/family/caregiver demonstrates understanding of disease process, treatment plan, medications, and discharge instructions  Description: Complete learning assessment and assess knowledge base.   Interventions:  - Provide teaching at level of understanding  - Provide teaching via preferred learning methods  Outcome: Progressing     Problem: RESPIRATORY - ADULT  Goal: Achieves optimal ventilation and oxygenation  Description: INTERVENTIONS:  - Assess for changes in respiratory status  - Assess for changes in mentation and behavior  - Position to facilitate oxygenation and minimize respiratory effort  - Oxygen administered by appropriate delivery if ordered  - Initiate smoking cessation education as indicated  - Encourage broncho-pulmonary hygiene including cough, deep breathe, Incentive Spirometry  - Assess the need for suctioning and aspirate as needed  - Assess and instruct to report SOB or any respiratory difficulty  - Respiratory Therapy support as indicated  Outcome: Progressing

## 2023-12-08 NOTE — PROGRESS NOTES
Pulmonary Rehabilitation Plan of Care   [facility]      Today's date: 2023   # of Exercise Sessions Completed: 17  Patient name: Jayson Camarena      : 1945  Age: 77 y.o. MRN: [id number]  Referring Physician: Gulshan Duke MD (Internal Med)  Pulmonologist: Anisa Henson DO  Provider: [facility]  Clinician: Risa Og. Mariah Still, MPT, CCRP    Dx:  COPD, very severe  Ratio 44%  Date of onset: 2023 (Last hospitalized exacerbation)  Hospitalized on 10/18/2023 with pneumonia and COPD exacerbation  Hospitalized 2023 w/COPD exacerbation and aspiration pneumonia    SUMMARY OF PROGRESS:  Mariann Cruz has completed 17 sessions of Pulmonary Rehab for the diagnosis of COPD. Patient is being discharged at this time due to hospitalization for exacerbation of COPD and recurrent aspiration  pneumonia. Currently, the discharge plan is to receive [facility]. This CR staff spoke w/patient's daughter Robert Roth. It was explained that Mariann Cruz is will be discharged from [facility] at this time due to the above. She stated understanding. Patient may benefit from [place] at a future date when medically stable. Patient does have a PFT on file, revealing an FEV1/FVC ratio of 44% and an FEV1 of 1.06 L (38%). This is suggestive of severeobstruction. Since their diagnosis, the patient has been experiencing increased dyspnea, increased sitting time, decreased physical activity, increased fatigue and increased reliance on supplemental O2. They report dyspnea and fatigue when completing ADLs . The patient currently does not follow a formal exercise program at home. Pt reports the following physical limitations: decreased strength, fatigue, CHACON. Depression screening using the PHQ-9 interprets the patient's score of 5 5-9 = Mild Depression. Anxiety screening using the PORTILLO-7 interprets the patients score of 3  0-4  = Not anxious. When addressed, the patient admits to having depression/anxiety.  Patient reports excellent social/emotional support from friends and family. Information to begin using GFRANQ1 [place] was provided as well as contact information for counseling through [facility]. The patient is a former smoker (quit 28 years ago). He has abstained since quitting. Patient admits to 100% medication compliance. At rest, the patient rated dyspnea 0/10 with SpO2 93% on supplemental O2 3L/min via nasal canula. Resting HR 99 and /68. He has been working at a 3.68 MET level. During exercise, , /66, and SpO2 89-93%. Patient exercises on 3-4L/min. Telemetry revealed NSR w/ freq PVCs. During recovery, , /68, and SpO2 91%. Education on smoking cessation, oxygen use, breathing techniques, pulmonary anatomy, exercise for the pulmonary patient, healthy eating, stress, and relaxation will be provided. Patient goals include: increase strength, improve CHACON, improve LVEF to qualify for Throckmorton valve procedure. .      There is no significant progress to report in this 30 day reporting window because the patient only attended 3 exercise sessions due to a recent hospitalization. Due to Shlomo's known cardiac history and need for increased LVEF, he will also be monitored via telemetry. Telemetery revealed NSR w/ freq ectopy. Freq ectopy is noted in recent cardiology notes.  His primary cardiologist is Pratibha Bowling DO and advanced heart failure cardiologist is Rafael Vasquez MD.      Medication compliance: Yes   Comments: Pt reports to be compliant with medications    Fall Risk: Low   Comments: Ambulates with a steady gait with no assist device    Smoking: Former user    RPD at Rest: 0-1/10  RPD with Exercise:  0-4/10    Assessment of progression of lung disease and functional status:  CAT: 19/40  Shortness of breath questionnaire: 47/120      EXERCISE ASSESSMENT and PLAN    Current Exercise Program in Rehab:       Frequency: 2-3 days/week   Supplement with home exercise 2+ days/wk as tolerated        Minutes: 30-35         METS: 3.68              SpO2: >88%              RPD: 4-6                      HR: 20-30 bpm above rest   RPE: 4-5         Modalities: UBE, NuStep, Recumbent bike and Room walking      Exercise Progression 30 Day Goals :    Frequency: 2-3 days/week    Supplement with home exercise 2+ days/wk as tolerated       Minutes: 40-45         METS: 3.68-4.5              SpO2: >88%              RPD: 4-6                      HR: 20-30 bpm above rest   RPE: 4-5        Modalities: UBE, NuStep, Recumbent bike and Room walking     Strength training: Will be added following 2-3 weeks of monitored exercise sessions   Modalities: Leg Press, Chest Press and Calf Raises    Oxygen needs:   Rest:  supplemental O2 via nasal cannula @ 2L/min   Exercise/physical activity:  supplemental O2 via nasal cannula @ 3-4L/min   Sleep:   supplemental O2 via nasal cannula @ 2.5L/min  and CPAP/BiPap    Oxygen Goal: Maintain SpO2>88% during exercise    Home Exercise: none  Education: pursed lipped breathing, diaphragmatic breathing, fall prevention while using nasal canula tubing, relaxation breathing, home exercise guidelines, benefits of exercise for pulmonary disease, RPE scale and RPD scale    Goals: reduced score on  USCD Shortness of Breath Questionnaire, Improved 6MW distance by 10%, reduced dyspnea during exercise , improved exercise tolerance (max METs tolerated in pulmonary rehab), SpO2 >88% during exercise, reduced dependence on supplemental O2, reduced score on CAT, reduced number of COPD exacerbations and attend pulmonary rehab regularly  Progressing:  Pt is progressing and showing improvement  toward the following goals:  increasing MET level, reduced CHACON, improved SpO2 readings. , Will continue to educate and progress as tolerated., Goals met: attends rehab regularly.     Plan: Titrate supplemental oxygen as needed to maintain SpO2>88% with exercise, learn to conserve energy with ADLs , practice diaphragmatic breathing and reduce time sitting at home    Readiness to change: Preparation:  (Getting ready to change)       NUTRITION ASSESSMENT AND PLAN    Weight control:    Starting weight: 182   Current weight: 170  Patient had unexpected/unplanned weight loss with his recent 9 day hospitalization    Diabetes: N/A  A1c: 6.5    last measured: 9/2/2023    Lipid Panel: 9/2/2023  Tot: 159, Tri: 171, HDL: 73, LDL: 52    Goals: Increase water intake to reduce/thin mucus production Reduced SOB while eating Smaller more frequent meals Improve hydration  Education: heart healthy eating  low sodium diet  hydration  nutrition for  lipid management  Progressing:Will continue to educate and progress as tolerated. Plan: Education class: Reading Food Labels and Education Class: Heart Healthy Eating  Readiness to change: Preparation:  (Getting ready to change)       PSYCHOSOCIAL ASSESSMENT AND PLAN    Emotional:  Depression assessment:  PHQ-9 = 5 5-9 = Mild Depression            Anxiety measure:  PORTILLO-7 = 3 0-4  = Not anxious  Self-reported stress level: 5   Social support: Very Good    Goals:  Reduce perceived stress to 1-3/10, improved [facility] QOL < 27, PHQ-9 - reduced severity by one level, Increased interest in doing things, improved sleep, Improved appetite and improved concentration  Education: signs/sxs of depression, benefits of a positive support system, stress management techniques, depression and CAD and benefits of mental health counseling    Progressing:Pt is progressing and showing improvement  toward the following goals:  improving stress level.  , Will continue to educate and progress as tolerated.   Plan: Class: Stress and Your Health and Class: Relaxation  Readiness to change: Preparation:  (Getting ready to change)       OTHER CORE COMPONENTS     Tobacco:   Social History     Tobacco Use   Smoking Status Former    Packs/day: 2.00    Years: 35.00    Total pack years: 70.00    Types: Cigarettes    Start date: 1/1/1960    Quit date: 1995    Years since quittin.4   Smokeless Tobacco Never   Tobacco Comments    stopped smoking the day i had a heart attack       Tobacco Use Intervention: Referral to tobacco expert:   N/A: Pt has a remote history of smoking    Blood pressure:    Restin/68   Exercise: 136/66   Recovery: 116/68    Goals: consistent BP < 130/80, reduced dietary sodium <2300mg, moderate intensity exercise >150 mins/wk and medication compliance  Education:  pathophysiology of pulmonary disease, preventing infections, environmental triggers, nebulizer use, bronchodilators, bronchial hygiene, traveling with pulmonary disease, class: Pulmonary Anatomy and Physiology and class:  Pulmonary Medications  Progressing:Will continue to educate and progress as tolerated., Goals met: BP < 130/80, 100% medication compliance.   Plan: Class: Understanding Heart Disease and Class: Common Heart Medications  Readiness to change: Preparation:  (Getting ready to change)

## 2023-12-08 NOTE — PLAN OF CARE
Problem: PHYSICAL THERAPY ADULT  Goal: Performs mobility at highest level of function for planned discharge setting. See evaluation for individualized goals. Description: Treatment/Interventions: Elevations, Therapeutic exercise, Endurance training, Patient/family training, Gait training, Spoke to case management, Spoke to nursing          See flowsheet documentation for full assessment, interventions and recommendations. Note: Prognosis: Fair  Problem List: Decreased endurance, Impaired balance, Decreased mobility, Impaired hearing  Assessment: Pt is a 77 y.o. male seen for PT evaluation s/p admit to [address] [place] [address] “” [place] w/ COPD with acute exacerbation. PTA, pt was independent w/ all functional mobility and ADL's but required assistance for IADL's. Pt reports having 2 RW at home but does not use them at baseline for mobility. PT consulted to assess pt's functional mobility and d/c needs. At time of PT eval, pt found supine in bed and was agreeable to PT session. Pt was currently on 3.5L [place] supplemental oxygen and reports using 3-4L at baseline at home. Pt performed STS transfer, stand pivot transfer and ambulation with SUP assist x1. No drop SPO2 noted upon OOB activity but RUI may have been giving an inaccurate reading. SPO2 did drop to 88% during subjective intake but remained >90% at end of session. Pt was able to ambulate 12 ft with no AD with a decreased benny and some slight CHACON. While ambulating pt held onto the bedside table and bed and stated that he felt deconditioned. 2nd ambulation was attempted but pt refused because he stated that it takes him 5-10 minutes to recover his breathing post-ambulation and that he did  not want to do that. Based on evaluation, pt mobility and OOB tolerance is limited due to deconditioning and CHACON. Pt currently functioning below baseline level and will continue to benefit from immediate acute skilled PT services in order to address the deficits listed above. Areas for improvement include: decreased endurance, decreased mobility and impaired balance. From PT/mobility standpoint, at this time recommendation is a moderate resource intensity, in order to maximize pt's functional independence and safety/consistency w/ mobility. Rehab Resource Intensity Level, PT: II (Moderate Resource Intensity)    See flowsheet documentation for full assessment.

## 2023-12-09 VITALS
TEMPERATURE: 97.6 F | DIASTOLIC BLOOD PRESSURE: 76 MMHG | HEART RATE: 94 BPM | OXYGEN SATURATION: 97 % | SYSTOLIC BLOOD PRESSURE: 122 MMHG | BODY MASS INDEX: 25.16 KG/M2 | RESPIRATION RATE: 18 BRPM | HEIGHT: 68 IN | WEIGHT: 166.01 LBS

## 2023-12-09 LAB
ANION GAP SERPL CALCULATED.3IONS-SCNC: 6 MMOL/L
BASOPHILS # BLD AUTO: 0.02 THOUSANDS/ÂΜL (ref 0–0.1)
BASOPHILS NFR BLD AUTO: 0 % (ref 0–1)
BUN SERPL-MCNC: 25 MG/DL (ref 5–25)
CALCIUM SERPL-MCNC: 8 MG/DL (ref 8.4–10.2)
CHLORIDE SERPL-SCNC: 102 MMOL/L (ref 96–108)
CO2 SERPL-SCNC: 33 MMOL/L (ref 21–32)
CREAT SERPL-MCNC: 0.88 MG/DL (ref 0.6–1.3)
EOSINOPHIL # BLD AUTO: 0 THOUSAND/ÂΜL (ref 0–0.61)
EOSINOPHIL NFR BLD AUTO: 0 % (ref 0–6)
ERYTHROCYTE [DISTWIDTH] IN BLOOD BY AUTOMATED COUNT: 16.1 % (ref 11.6–15.1)
GFR SERPL CREATININE-BSD FRML MDRD: 81 ML/MIN/1.73SQ M
GLUCOSE SERPL-MCNC: 92 MG/DL (ref 65–140)
HCT VFR BLD AUTO: 44.3 % (ref 36.5–49.3)
HGB BLD-MCNC: 13.5 G/DL (ref 12–17)
IMM GRANULOCYTES # BLD AUTO: 0.29 THOUSAND/UL (ref 0–0.2)
IMM GRANULOCYTES NFR BLD AUTO: 2 % (ref 0–2)
LYMPHOCYTES # BLD AUTO: 0.89 THOUSANDS/ÂΜL (ref 0.6–4.47)
LYMPHOCYTES NFR BLD AUTO: 6 % (ref 14–44)
MCH RBC QN AUTO: 30.9 PG (ref 26.8–34.3)
MCHC RBC AUTO-ENTMCNC: 30.5 G/DL (ref 31.4–37.4)
MCV RBC AUTO: 101 FL (ref 82–98)
MONOCYTES # BLD AUTO: 1.03 THOUSAND/ÂΜL (ref 0.17–1.22)
MONOCYTES NFR BLD AUTO: 7 % (ref 4–12)
NEUTROPHILS # BLD AUTO: 11.96 THOUSANDS/ÂΜL (ref 1.85–7.62)
NEUTS SEG NFR BLD AUTO: 85 % (ref 43–75)
NRBC BLD AUTO-RTO: 0 /100 WBCS
PLATELET # BLD AUTO: 259 THOUSANDS/UL (ref 149–390)
PMV BLD AUTO: 10.6 FL (ref 8.9–12.7)
POTASSIUM SERPL-SCNC: 4.1 MMOL/L (ref 3.5–5.3)
RBC # BLD AUTO: 4.37 MILLION/UL (ref 3.88–5.62)
SODIUM SERPL-SCNC: 141 MMOL/L (ref 135–147)
WBC # BLD AUTO: 14.19 THOUSAND/UL (ref 4.31–10.16)

## 2023-12-09 PROCEDURE — 85025 COMPLETE CBC W/AUTO DIFF WBC: CPT | Performed by: NURSE PRACTITIONER

## 2023-12-09 PROCEDURE — 99239 HOSP IP/OBS DSCHRG MGMT >30: CPT | Performed by: NURSE PRACTITIONER

## 2023-12-09 PROCEDURE — 94760 N-INVAS EAR/PLS OXIMETRY 1: CPT

## 2023-12-09 PROCEDURE — 80048 BASIC METABOLIC PNL TOTAL CA: CPT | Performed by: NURSE PRACTITIONER

## 2023-12-09 PROCEDURE — 94664 DEMO&/EVAL PT USE INHALER: CPT

## 2023-12-09 PROCEDURE — 94640 AIRWAY INHALATION TREATMENT: CPT

## 2023-12-09 RX ADMIN — DOCUSATE SODIUM 100 MG: 100 CAPSULE, LIQUID FILLED ORAL at 09:45

## 2023-12-09 RX ADMIN — BUDESONIDE 0.5 MG: 0.5 INHALANT ORAL at 07:21

## 2023-12-09 RX ADMIN — FUROSEMIDE 20 MG: 20 TABLET ORAL at 09:46

## 2023-12-09 RX ADMIN — FORMOTEROL FUMARATE DIHYDRATE 20 MCG: 20 SOLUTION RESPIRATORY (INHALATION) at 07:42

## 2023-12-09 RX ADMIN — GUAIFENESIN 600 MG: 600 TABLET ORAL at 09:45

## 2023-12-09 RX ADMIN — FLUTICASONE PROPIONATE 1 SPRAY: 50 SPRAY, METERED NASAL at 09:46

## 2023-12-09 RX ADMIN — EMPAGLIFLOZIN 10 MG: 10 TABLET, FILM COATED ORAL at 09:45

## 2023-12-09 RX ADMIN — ASPIRIN 81 MG CHEWABLE TABLET 81 MG: 81 TABLET CHEWABLE at 09:45

## 2023-12-09 RX ADMIN — BARICITINIB 4 MG: 2 TABLET, FILM COATED ORAL at 09:45

## 2023-12-09 RX ADMIN — METOPROLOL SUCCINATE 12.5 MG: 25 TABLET, EXTENDED RELEASE ORAL at 09:44

## 2023-12-09 RX ADMIN — LEVALBUTEROL HYDROCHLORIDE 1.25 MG: 1.25 SOLUTION RESPIRATORY (INHALATION) at 07:21

## 2023-12-09 RX ADMIN — HEPARIN SODIUM 5000 UNITS: 5000 INJECTION INTRAVENOUS; SUBCUTANEOUS at 05:27

## 2023-12-09 RX ADMIN — PANTOPRAZOLE SODIUM 40 MG: 40 TABLET, DELAYED RELEASE ORAL at 05:27

## 2023-12-09 NOTE — DISCHARGE SUMMARY
[address] [facility]  Discharge- [facility] 1945, 77 y.o. male MRN: [id number]  Unit/Bed#: -[facility] Encounter: [id number]  Primary Care Provider: Odilia Kauffman DO   Date and time admitted to hospital: 12/1/2023  2:15 PM    Acute and chronic respiratory failure with hypoxia [facility])  Assessment & Plan  Required up to 5 L nasal cannula supplemental oxygen to maintain saturation greater than 90%.   At baseline he uses 2 to 3 L  Currently on 3.5 L with nonlabored respirations at rest  Likely secondary to COPD exacerbation/COVID infection-please see treatment plan    * COPD with acute exacerbation ([address])  Assessment & Plan  With acute on chronic hypoxic respiratory failure and diffuse wheezing  S/p Decadron per COVID pathway  Status post 7 days of IV ceftriaxone  Continue Mucinex  Continue nebulizers  Outpatient follow-up with PCP    COVID-19  Assessment & Plan  COVID positive on 12/1/2023  S/p remdesivir IV x 5 days  Treated with baricitinib    Chronic systolic congestive heart failure ([address])  Assessment & Plan  Does not appear volume overloaded  TTE 8/18/2023 with EF 35% with moderately reduced systolic function  Continue Lasix 20 mg daily    LAMBERTO on CPAP  Assessment & Plan  Continue CPAP at bedtime    GERD (gastroesophageal reflux disease)  Assessment & Plan  Continue home famotidine and pantoprazole    Hyperlipidemia  Assessment & Plan  Continue statin      Medical Problems       Resolved Problems  Date Reviewed: 12/8/2023            Resolved    Hypertension 10/26/2023     Resolved by  Felipe Sebastian DO        Discharging Physician / Practitioner: INOCENTE Moss  PCP: Odilia Kauffman DO  Admission Date:   Admission Orders (From admission, onward)       Ordered        12/02/23 1517  Inpatient Admission  Once            12/01/23 1723  Place in Observation  Once                          Discharge Date: 12/09/23    Significant Findings / Test Results:   Chest x-ray 12/1/2023: Near complete resolution of previously noted bibasilar opacities. However, there continues to be superimposed mild bibasilar interstitial and small patchy opacities. This likely represents a combination of mild interstitial pulmonary edema and bibasilar infection/inflammation, such as from recurrent aspiration pneumonia. Stable emphysematous changes. Small bilateral pleural effusions. CT chest 12/1/2023: No acute abnormality. Improved bilateral pleural effusions with stable bibasilar patchy opacity, right greater than left, likely chronic atelectasis with calcification or aspirated contrast/barium    Complications:  None apparent    Reason for Admission: Acute respiratory failure, COPD exacerbation, COVID infection    Hospital Course:   Jonathan Santiago is a 77 y.o. male patient who originally presented to the hospital on 12/1/2023 due to shortness of breath. He was found to be positive for COVID. He was also in COPD exacerbation. CT chest revealed bilateral pleural effusions with bibasilar patchy opacities. He was requiring increased oxygen over his baseline. He was started on IV steroids and nebulized medications. He was admitted to medicine. He was started on Remdesivir,  baricitinib, and Decadron. He gradually improved, returned to his baseline oxygen requirements,  and was able to be discharged home with home health care. This is a brief discharge summary. Please see full patient chart for additional details. Condition at Discharge: stable    Discharge Day Visit / Exam:   Subjective: Patient seen and examined. He said he is feeling better. He is ok with plan to discharge home.    Vitals: Blood Pressure: 122/76 (12/09/23 0723)  Pulse: 94 (12/09/23 0723)  Temperature: 97.6 °F (36.4 °C) (12/09/23 0723)  Temp Source: Oral (12/08/23 2300)  Respirations: 18 (12/09/23 0723)  Height: 5' 8" (172.7 cm) (12/07/23 1100)  Weight - Scale: 75.3 kg (166 lb 0.1 oz) (12/09/23 0558)  SpO2: 97 % (12/09/23 2776)  Exam:   Physical Exam  Vitals and nursing note reviewed. Constitutional:       General: He is not in acute distress. HENT:      Head: Normocephalic and atraumatic. Nose: Nose normal.      Mouth/Throat:      Mouth: Mucous membranes are moist.      Pharynx: Oropharynx is clear. Eyes:      Pupils: Pupils are equal, round, and reactive to light. Cardiovascular:      Rate and Rhythm: Normal rate and regular rhythm. Pulses: Normal pulses. Pulmonary:      Effort: Pulmonary effort is normal. No respiratory distress. Breath sounds: Normal breath sounds. No wheezing, rhonchi or rales. Abdominal:      General: Bowel sounds are normal.      Palpations: Abdomen is soft. Tenderness: There is no abdominal tenderness. Musculoskeletal:      Cervical back: Neck supple. Right lower leg: No edema. Left lower leg: No edema. Skin:     General: Skin is warm and dry. Capillary Refill: Capillary refill takes less than 2 seconds. Neurological:      General: No focal deficit present. Mental Status: He is alert and oriented to person, place, and time. Mental status is at baseline. Discussion with Family: Updated  (daughter) via phone. Discharge instructions/Information to patient and family:   See after visit summary for information provided to patient and family. Provisions for Follow-Up Care:  See after visit summary for information related to follow-up care and any pertinent home health orders. Mobility at time of Discharge:   Basic Mobility Inpatient Raw Score: 21  JH-HLM Goal: 6: Walk 10 steps or more  JH-HLM Achieved: 6: Walk 10 steps or more  HLM Goal achieved. Continue to encourage appropriate mobility. Disposition:   Home with VNA Services (Reminder: Complete face to face encounter)    Planned Readmission: No   Discharge Statement:  I spent 50 minutes discharging the patient. This time was spent on the day of discharge.  I had direct contact with the patient on the day of discharge. Greater than 50% of the total time was spent examining patient, answering all patient questions, arranging and discussing plan of care with patient as well as directly providing post-discharge instructions. Additional time then spent on discharge activities. Discharge Medications:  See after visit summary for reconciled discharge medications provided to patient and/or family.       **Please Note: This note may have been constructed using a voice recognition system**

## 2023-12-09 NOTE — ASSESSMENT & PLAN NOTE
Required up to 5 L nasal cannula supplemental oxygen to maintain saturation greater than 90%.   At baseline he uses 2 to 3 L  Currently on 3.5 L with nonlabored respirations at rest  Likely secondary to COPD exacerbation/COVID infection-please see treatment plan

## 2023-12-09 NOTE — PLAN OF CARE
Problem: Potential for Falls  Goal: Patient will remain free of falls  Description: INTERVENTIONS:  - Educate patient/family on patient safety including physical limitations  - Instruct patient to call for assistance with activity   - Consult OT/PT to assist with strengthening/mobility   - Keep Call bell within reach  - Keep bed low and locked with side rails adjusted as appropriate  - Keep care items and personal belongings within reach  - Initiate and maintain comfort rounds  - Make Fall Risk Sign visible to staff  - Offer Toileting in advance of need  - Initiate/Maintain bed alarm  - Obtain necessary fall risk management equipment  - Apply yellow socks and bracelet for high fall risk patients  - Consider moving patient to room near nurses station  Outcome: Progressing     Problem: INFECTION - ADULT  Goal: Absence or prevention of progression during hospitalization  Description: INTERVENTIONS:  - Assess and monitor for signs and symptoms of infection  - Monitor lab/diagnostic results  - Monitor all insertion sites, i.e. indwelling lines, tubes, and drains  - Monitor endotracheal if appropriate and nasal secretions for changes in amount and color  - [place] appropriate cooling/warming therapies per order  - Administer medications as ordered  - Instruct and encourage patient and family to use good hand hygiene technique  - Identify and instruct in appropriate isolation precautions for identified infection/condition  Outcome: Progressing     Problem: DISCHARGE PLANNING  Goal: Discharge to home or other facility with appropriate resources  Description: INTERVENTIONS:  - Identify barriers to discharge w/patient and caregiver  - Arrange for needed discharge resources and transportation as appropriate  - Identify discharge learning needs (meds, wound care, etc.)  - Refer to Case Management Department for coordinating discharge planning if the patient needs post-hospital services based on physician/advanced practitioner order or complex needs related to functional status, cognitive ability, or social support system  Outcome: Progressing     Problem: Knowledge Deficit  Goal: Patient/family/caregiver demonstrates understanding of disease process, treatment plan, medications, and discharge instructions  Description: Complete learning assessment and assess knowledge base.   Interventions:  - Provide teaching at level of understanding  - Provide teaching via preferred learning methods  Outcome: Progressing     Problem: RESPIRATORY - ADULT  Goal: Achieves optimal ventilation and oxygenation  Description: INTERVENTIONS:  - Assess for changes in respiratory status  - Assess for changes in mentation and behavior  - Position to facilitate oxygenation and minimize respiratory effort  - Oxygen administered by appropriate delivery if ordered  - Initiate smoking cessation education as indicated  - Encourage broncho-pulmonary hygiene including cough, deep breathe, Incentive Spirometry  - Assess the need for suctioning and aspirate as needed  - Assess and instruct to report SOB or any respiratory difficulty  - Respiratory Therapy support as indicated  Outcome: Progressing

## 2023-12-09 NOTE — CASE MANAGEMENT
Case Management Discharge Planning Note    Patient name Aisha Collins  Location /-77 MRN [id number]  : 1945 Date 2023       Current Admission Date: 2023  Current Admission Diagnosis:COPD with acute exacerbation [facility])   Patient Active Problem List    Diagnosis Date Noted    Chronic systolic congestive heart failure ([address]) 10/22/2023    Erectile dysfunction 2023    Other disorders of refraction 2023    Occlusion and stenosis of unspecified carotid artery 2023    Dysphagia 2023    Congestive heart failure with left ventricular systolic dysfunction (LVSD) ([address]) 2023    Hypoxemia 2023    Intracranial aneurysm 2023    Allergic rhinitis, unspecified 2023    Chronic kidney disease, stage 3a ([address]) 2023    Generalized muscle weakness 2023    Hypertensive heart and chronic kidney disease with heart failure and stage 1 through stage 4 chronic kidney disease, or unspecified chronic kidney disease ([address]) 2023    Need for assistance with personal care 2023    Other abnormalities of gait and mobility 2023    Retention of urine, unspecified 2023    Sudden idiopathic hearing loss, right ear 2023    Acute and chronic respiratory failure with hypoxia ([address]) 2023    Elevated troponin 2023    Orthopnea 2023    Lower extremity edema 2023    Irregular heart rhythm 2023    Pneumonia of right lower lobe due to infectious organism 03/15/2023    Leukocytosis 2023    Left leg pain 2023    Transient right leg weakness 2023    Hypokalemia 2023    COVID-19 2023    Ambulatory dysfunction 2023    COPD (chronic obstructive pulmonary disease) ([address]) 2023    Sensorineural hearing loss, bilateral 10/19/2022    Chronic respiratory failure with hypoxia ([address]) 10/15/2022    Prostate cancer ([address]) 2022    Elevated MCV 2022    Cubital tunnel syndrome on left 07/22/2022    Carpal tunnel syndrome on left 07/22/2022    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    Urinary frequency 04/27/2022    Incomplete bladder emptying 04/27/2022    Chronic bilateral low back pain with right-sided sciatica 04/18/2022    Mid back pain 04/18/2022    Thoracic radiculopathy 04/18/2022    Chronic pain syndrome 04/18/2022    Hoarseness or changing voice 04/14/2022    Chronic right-sided thoracic back pain 03/05/2022    Primary insomnia 03/02/2022    Right hip pain 01/20/2022    Abnormal nuclear stress test 03/18/2021    Costochondral chest pain 01/15/2021    COPD with acute exacerbation ([address]) 01/11/2021    Oxygen dependent 01/11/2021    S/P carotid endarterectomy 01/11/2021    Stented coronary artery 01/11/2021    Vertigo 01/11/2021    Anisometropia 01/11/2021    Osteoarthritis of spinal facet joint 01/11/2021    Chronic ischemic heart disease 01/11/2021    Diverticular disease of colon 01/11/2021    Dry eye syndrome 01/11/2021    GERD (gastroesophageal reflux disease) 01/11/2021    Acute hearing loss, right 01/11/2021    Elevated PSA 01/11/2021    Hypoxia 01/11/2021    Lens replaced by other means 01/11/2021    Lumbar radiculopathy 01/11/2021    Multinodular goiter 01/11/2021    Nuclear senile cataract 01/11/2021    Obesity 01/11/2021    Occlusion and stenosis of carotid artery 01/11/2021    Osteoarthritis of hip 01/11/2021    Prediabetes 01/11/2021    LAMBERTO on CPAP 01/11/2021    Solitary pulmonary nodule 01/11/2021    Thyroid nodule 01/11/2021    [place] syndrome, 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    Depression, recurrent ([address])     Hyperlipidemia     Coronary artery disease involving native coronary artery of native heart without angina pectoris     Left carotid artery occlusion       LOS (days): 7  Geometric Mean LOS (GMLOS) (days): 5.00  Days to GMLOS:-1.9     OBJECTIVE:  Risk of Unplanned Readmission Score: 49.39         Current admission status: Inpatient   Preferred Pharmacy:   [facility] [address], [address]  [address] [facility].  [address] [place] [address]  Phone: [phone] Fax: [phone] [address] [facility], [address] [address] [facility] [address]  Phone: [phone] Fax: [phone]    Primary Care Provider: Michelle Warren DO    Primary Insurance: [facility]  Secondary Insurance:     DISCHARGE DETAILS:    Discharge planning discussed with[de-identified] cm called Russell Martins at 12:3pm  Freedom of Choice: Yes  Comments - Freedom of Choice: pt accetped by [facility]dy - pt & fmily choice  pt & family are in agreement with the d/c plan  CM contacted family/caregiver?: Yes  Were Treatment Team discharge recommendations reviewed with patient/caregiver?: Yes  Did patient/caregiver verbalize understanding of patient care needs?: Yes  Were patient/caregiver advised of the risks associated with not following Treatment Team discharge recommendations?: Yes    Contacts  Patient Contacts: Isela Sharp  Relationship to Patient[de-identified] Family (daughter)  Contact Method: Phone  Phone Number: [phone]  Reason/Outcome: Discharge [facility]         Is the patient interested in [facility] at discharge?: Yes         Other Referral/Resources/Interventions Provided:  Interventions: [facility]  Referral Comments: pt accepted by Jonas Richard - family & pt's choice- granddaughter will transport the pt home and she will bring his inogen from home - family deny any additonal d/c needs         Treatment Team Recommendation: Home with [address] (home with family & s;lvna & outpt follow up- family will bring his portable oxygen)  Discharge Destination Plan[de-identified] Home with [address] (home with family & slvna & outpt follow up- family will bring his oxygen)                                      Family notified[de-identified] daughter was called

## 2023-12-09 NOTE — NURSING NOTE
Awake and alert /pleasant . 02 is maintained 3.5liters n/c. Will maintained overnight. No distress or increased sob at rest. Call bell near.  Watching tv

## 2023-12-09 NOTE — PLAN OF CARE
Problem: Potential for Falls  Goal: Patient will remain free of falls  Description: INTERVENTIONS:  - Educate patient/family on patient safety including physical limitations  - Instruct patient to call for assistance with activity   - Consult OT/PT to assist with strengthening/mobility   - Keep Call bell within reach  - Keep bed low and locked with side rails adjusted as appropriate  - Keep care items and personal belongings within reach  - Initiate and maintain comfort rounds  - Make Fall Risk Sign visible to staff  - Offer Toileting in advance of need  - Initiate/Maintain bed alarm  - Obtain necessary fall risk management equipment  - Apply yellow socks and bracelet for high fall risk patients  - Consider moving patient to room near nurses station  12/9/2023 1328 by Dieter Acosta RN  Outcome: Adequate for Discharge  12/9/2023 1028 by Dieter Acosta RN  Outcome: Progressing     Problem: INFECTION - ADULT  Goal: Absence or prevention of progression during hospitalization  Description: INTERVENTIONS:  - Assess and monitor for signs and symptoms of infection  - Monitor lab/diagnostic results  - Monitor all insertion sites, i.e. indwelling lines, tubes, and drains  - Monitor endotracheal if appropriate and nasal secretions for changes in amount and color  - [place] appropriate cooling/warming therapies per order  - Administer medications as ordered  - Instruct and encourage patient and family to use good hand hygiene technique  - Identify and instruct in appropriate isolation precautions for identified infection/condition  12/9/2023 1328 by Dieter Acosta RN  Outcome: Adequate for Discharge  12/9/2023 1028 by Dieter Acosta RN  Outcome: Progressing     Problem: DISCHARGE PLANNING  Goal: Discharge to home or other facility with appropriate resources  Description: INTERVENTIONS:  - Identify barriers to discharge w/patient and caregiver  - Arrange for needed discharge resources and transportation as appropriate  - Identify discharge learning needs (meds, wound care, etc.)  - Refer to Case Management Department for coordinating discharge planning if the patient needs post-hospital services based on physician/advanced practitioner order or complex needs related to functional status, cognitive ability, or social support system  12/9/2023 1328 by Julien Pan RN  Outcome: Adequate for Discharge  12/9/2023 1028 by Julien Pan RN  Outcome: Progressing     Problem: Knowledge Deficit  Goal: Patient/family/caregiver demonstrates understanding of disease process, treatment plan, medications, and discharge instructions  Description: Complete learning assessment and assess knowledge base.   Interventions:  - Provide teaching at level of understanding  - Provide teaching via preferred learning methods  12/9/2023 1328 by Julien Pan RN  Outcome: Adequate for Discharge  12/9/2023 1028 by Julien Pan RN  Outcome: Progressing     Problem: RESPIRATORY - ADULT  Goal: Achieves optimal ventilation and oxygenation  Description: INTERVENTIONS:  - Assess for changes in respiratory status  - Assess for changes in mentation and behavior  - Position to facilitate oxygenation and minimize respiratory effort  - Oxygen administered by appropriate delivery if ordered  - Initiate smoking cessation education as indicated  - Encourage broncho-pulmonary hygiene including cough, deep breathe, Incentive Spirometry  - Assess the need for suctioning and aspirate as needed  - Assess and instruct to report SOB or any respiratory difficulty  - Respiratory Therapy support as indicated  12/9/2023 1328 by Julien Pan RN  Outcome: Adequate for Discharge  12/9/2023 1028 by Julien Pan RN  Outcome: Progressing     Problem: Prexisting or High Potential for Compromised Skin Integrity  Goal: Skin integrity is maintained or improved  Description: INTERVENTIONS:  - Identify patients at risk for skin breakdown  - Assess and monitor skin integrity  - Assess and monitor nutrition and hydration status  - Monitor labs   - Assess for incontinence   - Turn and reposition patient  - Assist with mobility/ambulation  - Relieve pressure over bony prominences  - Avoid friction and shearing  - Provide appropriate hygiene as needed including keeping skin clean and dry  - Evaluate need for skin moisturizer/barrier cream  - Collaborate with interdisciplinary team   - Patient/family teaching  - Consider wound care consult   12/9/2023 1328 by Raquel Khanna RN  Outcome: Adequate for Discharge  12/9/2023 1028 by Raquel Khanna RN  Outcome: Progressing

## 2023-12-09 NOTE — ASSESSMENT & PLAN NOTE
With acute on chronic hypoxic respiratory failure and diffuse wheezing  S/p Decadron per COVID pathway  Status post 7 days of IV ceftriaxone  Continue Mucinex  Continue nebulizers  Outpatient follow-up with PCP

## 2023-12-09 NOTE — PLAN OF CARE
Problem: Potential for Falls  Goal: Patient will remain free of falls  Description: INTERVENTIONS:  - Educate patient/family on patient safety including physical limitations  - Instruct patient to call for assistance with activity   - Consult OT/PT to assist with strengthening/mobility   - Keep Call bell within reach  - Keep bed low and locked with side rails adjusted as appropriate  - Keep care items and personal belongings within reach  - Initiate and maintain comfort rounds  - Make Fall Risk Sign visible to staff  - Offer Toileting in advance of need  - Initiate/Maintain bed alarm  - Obtain necessary fall risk management equipment  - Apply yellow socks and bracelet for high fall risk patients  - Consider moving patient to room near nurses station  Outcome: Progressing     Problem: INFECTION - ADULT  Goal: Absence or prevention of progression during hospitalization  Description: INTERVENTIONS:  - Assess and monitor for signs and symptoms of infection  - Monitor lab/diagnostic results  - Monitor all insertion sites, i.e. indwelling lines, tubes, and drains  - Monitor endotracheal if appropriate and nasal secretions for changes in amount and color  - [place] appropriate cooling/warming therapies per order  - Administer medications as ordered  - Instruct and encourage patient and family to use good hand hygiene technique  - Identify and instruct in appropriate isolation precautions for identified infection/condition  Outcome: Progressing     Problem: DISCHARGE PLANNING  Goal: Discharge to home or other facility with appropriate resources  Description: INTERVENTIONS:  - Identify barriers to discharge w/patient and caregiver  - Arrange for needed discharge resources and transportation as appropriate  - Identify discharge learning needs (meds, wound care, etc.)  - Refer to Case Management Department for coordinating discharge planning if the patient needs post-hospital services based on physician/advanced practitioner order or complex needs related to functional status, cognitive ability, or social support system  Outcome: Progressing     Problem: Knowledge Deficit  Goal: Patient/family/caregiver demonstrates understanding of disease process, treatment plan, medications, and discharge instructions  Description: Complete learning assessment and assess knowledge base.   Interventions:  - Provide teaching at level of understanding  - Provide teaching via preferred learning methods  Outcome: Progressing     Problem: RESPIRATORY - ADULT  Goal: Achieves optimal ventilation and oxygenation  Description: INTERVENTIONS:  - Assess for changes in respiratory status  - Assess for changes in mentation and behavior  - Position to facilitate oxygenation and minimize respiratory effort  - Oxygen administered by appropriate delivery if ordered  - Initiate smoking cessation education as indicated  - Encourage broncho-pulmonary hygiene including cough, deep breathe, Incentive Spirometry  - Assess the need for suctioning and aspirate as needed  - Assess and instruct to report SOB or any respiratory difficulty  - Respiratory Therapy support as indicated  Outcome: Progressing     Problem: Prexisting or High Potential for Compromised Skin Integrity  Goal: Skin integrity is maintained or improved  Description: INTERVENTIONS:  - Identify patients at risk for skin breakdown  - Assess and monitor skin integrity  - Assess and monitor nutrition and hydration status  - Monitor labs   - Assess for incontinence   - Turn and reposition patient  - Assist with mobility/ambulation  - Relieve pressure over bony prominences  - Avoid friction and shearing  - Provide appropriate hygiene as needed including keeping skin clean and dry  - Evaluate need for skin moisturizer/barrier cream  - Collaborate with interdisciplinary team   - Patient/family teaching  - Consider wound care consult   Outcome: Progressing

## 2023-12-09 NOTE — ASSESSMENT & PLAN NOTE
Does not appear volume overloaded  TTE 8/18/2023 with EF 35% with moderately reduced systolic function  Continue Lasix 20 mg daily

## 2023-12-10 ENCOUNTER — TELEPHONE (OUTPATIENT)
Dept: OTHER | Facility: OTHER | Age: 78
End: 2023-12-10

## 2023-12-10 NOTE — TELEPHONE ENCOUNTER
Patient daughter called and canceled her dad appointment because he is unable to make the appointment. Patient daughter is asking if the office can give her back a call to reschedule her dad appointment.

## 2023-12-11 ENCOUNTER — HOME CARE VISIT (OUTPATIENT)
Dept: HOME HEALTH SERVICES | Facility: HOME HEALTHCARE | Age: 78
End: 2023-12-11

## 2023-12-11 ENCOUNTER — TRANSITIONAL CARE MANAGEMENT (OUTPATIENT)
Dept: FAMILY MEDICINE CLINIC | Facility: CLINIC | Age: 78
End: 2023-12-11

## 2023-12-11 ENCOUNTER — PATIENT OUTREACH (OUTPATIENT)
Dept: CASE MANAGEMENT | Facility: OTHER | Age: 78
End: 2023-12-11

## 2023-12-11 NOTE — UTILIZATION REVIEW
NOTIFICATION OF ADMISSION DISCHARGE   This is a Notification of Discharge from [facility]. Please be advised that this patient has been discharge from our facility. Below you will find the admission and discharge date and time including the patient’s disposition. UTILIZATION REVIEW CONTACT:  Brandi Yadav  Utilization   Network Utilization Review Department  Phone: [phone] carefully listen to the prompts. All voicemails are confidential.  Email: [email or web address]     ADMISSION INFORMATION  PRESENTATION DATE: 12/1/2023  2:15 PM  OBERVATION ADMISSION DATE:   INPATIENT ADMISSION DATE: 12/2/23  3:17 PM   DISCHARGE DATE: 12/9/2023  1:28 PM   DISPOSITION:Home with [facility] Utilization Review Department  ATTENTION: Please call with any questions or concerns to [phone] and carefully listen to the prompts so that you are directed to the right person. All voicemails are confidential.   For Discharge needs, contact Care Management DC Support Team at [phone] opt. 2  Send all requests for admission clinical reviews, approved or denied determinations and any other requests to dedicated fax number below belonging to the campus where the patient is receiving treatment.  List of dedicated fax numbers for the Facilities:  [place] DENIALS (Administrative/Medical Necessity) [phone]   DISCHARGE SUPPORT TEAM (Network) [phone] [address] (Maternity/NICU/Pediatrics) [phone]   [address] [facility] [address] [place] [address] [address]  [facility] [phone]

## 2023-12-11 NOTE — PROGRESS NOTES
Johnathan Fay OP RT CM called and spoke with patient regarding their breathing, medications and O2. I spoke with daughter Amanda Ellison regarding her dad. Fritz Ingram was discharged over the weekend. Amanda Ellison stated his respiratory nebulizer medications are the same and he is on 3lpm nasal cannula. Reviewed medications. Amanda Ellison was driving so couldn't check his SpO2, encouraged to check daily. She stated her dad is coughing up sputum regularly and easily; however he spits into a tissue and she is unsure of color or consistency. mAanda Ellison stated her dad is very weak. He had a few pulmonary rehab sessions cancelled due to hospitalizations. He would like to resume however due to weaken condition, possibly home health care PT would benefit prior. Cuauhtemoc Molina has a PCP MD appt. F/u scheduled for Wednesday this week. Damonmichelle Ellison stated she will call pulmonary as well to schedule. Amanda Ellison grateful for the outreach. I will outreach next week and she has my contact information.

## 2023-12-12 ENCOUNTER — RA CDI HCC (OUTPATIENT)
Dept: OTHER | Facility: HOSPITAL | Age: 78
End: 2023-12-12

## 2023-12-12 ENCOUNTER — HOME CARE VISIT (OUTPATIENT)
Dept: HOME HEALTH SERVICES | Facility: HOME HEALTHCARE | Age: 78
End: 2023-12-12
Payer: COMMERCIAL

## 2023-12-12 PROCEDURE — G0299 HHS/HOSPICE OF RN EA 15 MIN: HCPCS

## 2023-12-12 PROCEDURE — 400013 VN SOC

## 2023-12-12 NOTE — PROGRESS NOTES
[facility] [address] [facility] coding opportunities          Chart Reviewed number of suggestions sent to Provider: 2     Patients Insurance   I13.0 and I42.9  Medicare Insurance: [facility] Medicare Complete

## 2023-12-12 NOTE — CASE COMMUNICATION
[facility] has admitted your patient to [facility] service with the following disciplines:      SN and PT  Response needed, please respond via inbasket  Primary focus of home health care. PULMONARY  Patient stated goals of care. TO BE STRONGER AND DO OUTPATIENT THERAPY  Anticipated visit pattern and next visit date. 6G7. 2W3  NEXT VISIT FRIDAY  See medication list - meds in home differ from AVS. PT STILL HAVE TO OBTAIN baricitinib  4 MG TABS AND MUCINEX  PT REPORTS TAKING POTASSIUM 20 MG EVERY OTHER DAY WHICH IS NOT LISTED ON HIS AVS. SHOULD HE CONTINUE TAKING THIS? [address] YOU  Significant clinical findings. WEAKNESS, FATIGUE, DECREASE ENDURANCE, DO  Potential barriers to goal achievement. NA  Other pertinent information. [facility]    Thank you for allowing us to participate in the care of your patient.

## 2023-12-13 ENCOUNTER — OFFICE VISIT (OUTPATIENT)
Dept: FAMILY MEDICINE CLINIC | Facility: CLINIC | Age: 78
End: 2023-12-13
Payer: COMMERCIAL

## 2023-12-13 VITALS
TEMPERATURE: 99.7 F | BODY MASS INDEX: 26.13 KG/M2 | WEIGHT: 172.4 LBS | RESPIRATION RATE: 18 BRPM | HEART RATE: 88 BPM | HEIGHT: 68 IN | SYSTOLIC BLOOD PRESSURE: 118 MMHG | DIASTOLIC BLOOD PRESSURE: 64 MMHG | OXYGEN SATURATION: 94 %

## 2023-12-13 VITALS
DIASTOLIC BLOOD PRESSURE: 72 MMHG | SYSTOLIC BLOOD PRESSURE: 128 MMHG | OXYGEN SATURATION: 97 % | TEMPERATURE: 97.9 F | RESPIRATION RATE: 18 BRPM | HEART RATE: 78 BPM

## 2023-12-13 DIAGNOSIS — H57.12 LEFT EYE PAIN: ICD-10-CM

## 2023-12-13 DIAGNOSIS — K59.09 INTERMITTENT CONSTIPATION: ICD-10-CM

## 2023-12-13 DIAGNOSIS — R26.2 AMBULATORY DYSFUNCTION: ICD-10-CM

## 2023-12-13 DIAGNOSIS — J96.21 ACUTE AND CHRONIC RESPIRATORY FAILURE WITH HYPOXIA (HCC): Primary | ICD-10-CM

## 2023-12-13 DIAGNOSIS — I50.22 CHRONIC SYSTOLIC CONGESTIVE HEART FAILURE (HCC): ICD-10-CM

## 2023-12-13 DIAGNOSIS — J44.1 COPD WITH ACUTE EXACERBATION (HCC): ICD-10-CM

## 2023-12-13 PROCEDURE — 99496 TRANSJ CARE MGMT HIGH F2F 7D: CPT | Performed by: FAMILY MEDICINE

## 2023-12-13 RX ORDER — POTASSIUM CHLORIDE 20 MEQ/1
20 TABLET, EXTENDED RELEASE ORAL EVERY OTHER DAY
COMMUNITY

## 2023-12-13 NOTE — PROGRESS NOTES
Ami Eddy 1945 male MRN: [id number]    [facility] Medicine Transition of Care Visit      SUBJECTIVE    CC: MAGNUS REYNAS Visit     Transitional Care Management Review:  Ami Eddy is a 77 y.o. male here for TCM follow up. Admitted 12/1-12/9 for COPD with exacerbation. Hospital course as per discharge summary as below:    "Acute and chronic respiratory failure with hypoxia [facility])  Assessment & Plan  Required up to 5 L nasal cannula supplemental oxygen to maintain saturation greater than 90%. At baseline he uses 2 to 3 L  Currently on 3.5 L with nonlabored respirations at rest  Likely secondary to COPD exacerbation/COVID infection-please see treatment plan      Breathing back to baseline, feeling better. * COPD with acute exacerbation ([address])  Assessment & Plan  With acute on chronic hypoxic respiratory failure and diffuse wheezing  S/p Decadron per COVID pathway  Status post 7 days of IV ceftriaxone  Continue Mucinex  Continue nebulizers  Outpatient follow-up with PCP     He has not been able to  baricitinib since hospital discharge, advised at this point would not restart, continue to monitor off. COVID-19  Assessment & Plan  COVID positive on 12/1/2023  S/p remdesivir IV x 5 days  Treated with baricitinib     Chronic systolic congestive heart failure (HCC)  Assessment & Plan  Does not appear volume overloaded  TTE 8/18/2023 with EF 35% with moderately reduced systolic function  Continue Lasix 20 mg daily    He is taking Lasix 20 mg daily, 1+ pitting edema, advised to monitor may need to increase to full tablet 40 mg daily. He is taking potassium 20 mEq every other day, would continue this and will monitor. Blood work ordered.       LAMBERTO on CPAP  Assessment & Plan  Continue CPAP at bedtime     GERD (gastroesophageal reflux disease)  Assessment & Plan  Continue home famotidine and pantoprazole     Hyperlipidemia  Assessment & Plan  Continue statin"     "Ami Eddy is a 77 y.o. male patient who originally presented to the hospital on 12/1/2023 due to shortness of breath. He was found to be positive for COVID. He was also in COPD exacerbation. CT chest revealed bilateral pleural effusions with bibasilar patchy opacities. He was requiring increased oxygen over his baseline. He was started on IV steroids and nebulized medications. He was admitted to medicine. He was started on Remdesivir,  baricitinib, and Decadron. He gradually improved, returned to his baseline oxygen requirements,  and was able to be discharged home with home health care. This is a brief discharge summary. Please see full patient chart for additional details.  "  During the TCM phone call patient stated:    TCM Call       Date and time call was made  12/11/2023 10:20 AM    Hospital care reviewed  Records reviewed    Patient was hospitialized at  [address] [facility]    Date of Admission  12/01/23    Date of discharge  12/09/23    Diagnosis  COPD with acute exacerbation    Disposition  Home    Current Symptoms  Weakness    Cough Severity  Moderate    Shortness of breath severity  Moderate    Weakness severity  Moderate; Severe          TCM Call       Post hospital issues  None    Scheduled for follow up? Yes    Did you obtain your prescribed medications  Yes    Do you need help managing your prescriptions or medications  No    Is transportation to your appointment needed  No    I have advised the patient to call PCP with any new or worsening symptoms  Colette TREVINO    Living Arrangements  Children; Spouse or Significiant other    Support System  Children; Spouse    The type of support provided  Emotional; Physical; Other (comment)    Do you have social support  Yes, as much as I need            During today's visit:    He is planning on starting PT soon to strengthen. Constipation- Has not had BM in 3 days, gas with stool softener. Advised to try OTC Miralax daily. Past 2 days left eye redness, irritation, pain.  He is actually heading to the eye doctor today to  glasses, will see if they can squeeze him in. Using antibiotic drops. They are complying with their medication changes and discharge instructions. They have the following questions: As above     Review of Systems   All other systems reviewed and are negative.       Historical Information     The patient history was reviewed as follows:    Past Medical History:   Diagnosis Date    Abnormal ECG 2021 OR 2022    Alcoholism ([address]) 1984    sober 45 years    Arthritis 2008    beginning    Bilateral carotid artery stenosis     Callus     Cancer ([address])     skin    Chronic diastolic heart failure (HCC)     Chronic ischemic heart disease     Chronic kidney disease     stage 3    Colon polyp     COPD (chronic obstructive pulmonary disease) (HCC)     Coronary artery disease     hx stents, MI, PCI    COVID 11/2021    COVID 12/01/2023    CPAP (continuous positive airway pressure) dependence     Disease of thyroid gland 2017    nodules    Emphysema of lung ([address]) 1995    started    Hearing loss     History of transfusion 1995    Hyperlipidemia     Hypertension     Intracranial aneurysm 04/25/2023    Lung nodule 2023    x rays    MI (myocardial infarction) ([address]) 1995    Myocardial infarction ([address]) 1995    Pneumonia     Pneumothorax 02/20/2023    collapsed lung    Prostate cancer ([address])     RSV (respiratory syncytial virus infection) 10/2022    S/P carotid endarterectomy     Shortness of breath     O2 2 l/nc PRN    Sleep apnea     Sleep apnea, obstructive     Stented coronary artery      Past Surgical History:   Procedure Laterality Date    APPENDECTOMY      CARDIAC CATHETERIZATION  03/18/2021    left main with no significant disease, proximal LAD with 10% stenosis at the site of prior stent, left circumflex artery with minimal luminal irregularities mid RCA with 50% stenosis at site of prior stent and PL segment with distal disease supplied by collaterals from the distal circumflex with no significant CAD requiring revascularization at that time.     CATARACT EXTRACTION, BILATERAL Bilateral     COLONOSCOPY      CORONARY ANGIOPLASTY WITH STENT PLACEMENT      RCA    CORONARY ANGIOPLASTY WITH STENT PLACEMENT      RCA    CORONARY ANGIOPLASTY WITH STENT PLACEMENT      LAD    EYE SURGERY      N[place] BX PROSTATE STRTCTC SATURATION SAMPLING IMG GID N/A 2022    Procedure: TRANSPERINEAL MRI FUSION  BIOPSY PROSTATE;  Surgeon: Shamar Miles MD;  Location: [facility];  Service: Urology    NJ NEUROPLASTY &/TRANSPOS MEDIAN NRV [place] Left 2022    Procedure: RELEASE CARPAL TUNNEL;  Surgeon: Ashia Mcfarland MD;  Location: [facility];  Service: Orthopedics    N[place] NEUROPLASTY &/TRANSPOSITION ULNAR NERVE ELBOW Left 2022    Procedure: Yana Rivera;  Surgeon: Ashia Mcfarland MD;  Location: [facility];  Service: Orthopedics    NJ NEUROPLASTY &/TRANSPOSITION ULNAR NERVE WRIST Left 2022    Procedure: Wayne Pereira;  Surgeon: Ashia Mcfarland MD;  Location: [facility];  Service: Orthopedics    SKIN CANCER EXCISION  2012    chin-per pt, basal cell  also right ankle    TONSILLECTOMY  no     Family History   Problem Relation Age of Onset    Heart attack Mother     Dementia Mother     Heart failure Mother          2006    Heart disease Mother         heart attacks (2)    No Known Problems Father       Social History   Social History     Substance and Sexual Activity   Alcohol Use Not Currently     Social History     Substance and Sexual Activity   Drug Use Never     Social History     Tobacco Use   Smoking Status Former    Current packs/day: 0.00    Average packs/day: 2.0 packs/day for 35.5 years (71.0 ttl pk-yrs)    Types: Cigarettes    Start date: 1960    Quit date: 1995    Years since quittin.4   Smokeless Tobacco Never   Tobacco Comments    stopped smoking the day i had a heart attack       Medications:   Meds/Allergies     Current Outpatient Medications:     ALPRAZolam (XANAX) 0.25 mg tablet, Take 1 tablet (0.25 mg total) by mouth daily at bedtime as needed for anxiety, Disp: 15 tablet, Rfl: 0    aspirin 81 mg chewable tablet, 81 mg daily at bedtime, Disp: , Rfl:     bacitracin ophthalmic ointment, , Disp: , Rfl:     budesonide (PULMICORT) 0.5 mg/2 mL nebulizer solution, inhale contents of 1 vial ( 2 milliliters ) in nebulizer twice a day Rinse mouth after use, Disp: 180 mL, Rfl: 0    cholecalciferol (VITAMIN D3) 1,000 units tablet, Take 1,000 Units by mouth daily, Disp: , Rfl:     dextromethorphan-guaiFENesin (ROBITUSSIN DM)  mg/5 mL syrup, Take 5 mL by mouth 3 (three) times a day as needed for cough, Disp: 354 mL, Rfl: 0    Empagliflozin (Jardiance) 10 MG TABS tablet, Take 1 tablet (10 mg total) by mouth every morning, Disp: 90 tablet, Rfl: 5    famotidine (PEPCID) 20 mg tablet, TAKE 1 TABLET BY MOUTH DAILY AT  BEDTIME, Disp: 100 tablet, Rfl: 2    fluticasone (FLONASE) 50 mcg/act nasal spray, 1 spray into each nostril 2 (two) times a day, Disp: 9.9 mL, Rfl: 0    formoterol (PERFOROMIST) 20 MCG/2ML nebulizer solution, Take 2 mL (20 mcg total) by nebulization 2 (two) times a day, Disp: 180 mL, Rfl: 0    furosemide (LASIX) 40 mg tablet, Take 0.5 tablets (20 mg total) by mouth daily, Disp: 90 tablet, Rfl: 2    gabapentin (NEURONTIN) 300 mg capsule, Take 1 capsule (300 mg total) by mouth daily at bedtime, Disp: 90 capsule, Rfl: 0    guaiFENesin (MUCINEX) 600 mg 12 hr tablet, Take 1 tablet (600 mg total) by mouth 2 (two) times a day, Disp: 60 tablet, Rfl: 0    Humidifier MISC, Use continuous, Disp: 1 each, Rfl: 0    HYDROcodone-acetaminophen (Norco) 5-325 mg per tablet, Take 1 tablet by mouth every 6 (six) hours as needed for pain Max Daily Amount: 4 tablets, Disp: 15 tablet, Rfl: 0    ipratropium-albuterol (DUO-NEB) 0.5-2.5 mg/3 mL nebulizer solution, , Disp: , Rfl:     methocarbamol (ROBAXIN) 500 mg tablet, Take 1 tablet (500 mg total) by mouth 3 (three) times a day as needed for muscle spasms, Disp: 30 tablet, Rfl: 0    metoprolol succinate (TOPROL-XL) 25 mg 24 hr tablet, Take 0.5 tablets (12.5 mg total) by mouth 2 (two) times a day, Disp: 30 tablet, Rfl: 0    naloxone (NARCAN) 4 mg/0.1 mL nasal spray, Administer 1 spray into a nostril. If no response after 2-3 minutes, give another dose in the other nostril using a new spray., Disp: 1 each, Rfl: 1    nystatin (MYCOSTATIN) 500,000 units/5 mL suspension, 5 ml orally (swish and spit/swallow) every four hours while awake, Disp: 180 mL, Rfl: 0    omeprazole (PriLOSEC) 20 mg delayed release capsule, take 1 capsule by mouth once daily, Disp: 30 capsule, Rfl: 3    oxygen gas, Inhale 2 L/min continuous 2LPM at rest and 3-4 LPM with activity per D Cedric FANG notes, Disp: , Rfl:     potassium chloride (K-DUR,KLOR-CON) 20 mEq tablet, Take 20 mEq by mouth every other day, Disp: , Rfl:     rosuvastatin (CRESTOR) 40 MG tablet, TAKE 1 TABLET DAILY (Patient taking differently: Take 40 mg by mouth daily), Disp: 100 tablet, Rfl: 3    sodium chloride (OCEAN) 0.65 % nasal spray, 1 spray into each nostril every hour as needed for congestion, Disp: 30 mL, Rfl: 0  Allergies   Allergen Reactions    Lisinopril Swelling and Cough    Tetanus Antitoxin Anaphylaxis    Tetanus Toxoid Anaphylaxis and Swelling       OBJECTIVE    Vitals:   Vitals:    12/13/23 0855   BP: 118/64   Pulse: 88   Resp: 18   Temp: 99.7 °F (37.6 °C)   TempSrc: Tympanic   SpO2: 94%   Weight: 78.2 kg (172 lb 6.4 oz)   Height: 5' 8" (1.727 m)     Body mass index is 26.21 kg/m². Physical Exam:    Physical Exam  Vitals reviewed. Constitutional:       General: He is not in acute distress. Appearance: Normal appearance. He is not ill-appearing, toxic-appearing or diaphoretic. HENT:      Head: Normocephalic and atraumatic. Eyes:      General:         Right eye: No discharge. Left eye: Discharge present.      Extraocular Movements: Extraocular movements intact. Conjunctiva/sclera: Conjunctivae normal.      Comments: Left conjunctiva erythematous    Cardiovascular:      Rate and Rhythm: Normal rate and regular rhythm. Heart sounds: Normal heart sounds. No murmur heard. No friction rub. No gallop. Pulmonary:      Effort: Pulmonary effort is normal. No respiratory distress. Breath sounds: No stridor. No wheezing or rhonchi. Comments: Diminished breath sounds bases   Musculoskeletal:         General: No swelling, tenderness or signs of injury. Right lower leg: Edema present. Left lower leg: Edema present. Comments: 1+ pitting edema    Skin:     General: Skin is warm. Coloration: Skin is not pale. Findings: No erythema or rash. Neurological:      Mental Status: He is alert and oriented to person, place, and time. Motor: No weakness. Psychiatric:         Mood and Affect: Mood normal.         Behavior: Behavior normal.          Labs: I have personally reviewed all pertinent results. No results displayed because visit has over 200 results. Imaging:  I have personally reviewed all pertinent results. Assessment/Plan    No problem-specific Assessment & Plan notes found for this encounter. Yesy Duque was seen today for follow-up. Diagnoses and all orders for this visit:    Acute and chronic respiratory failure with hypoxia (HCC)  -     CBC and differential; Future  -     Comprehensive metabolic panel; Future    COPD with acute exacerbation (HCC)  -     CBC and differential; Future  -     Comprehensive metabolic panel; Future    Chronic systolic congestive heart failure [facility])    Ambulatory dysfunction    Intermittent constipation    Left eye pain          Future Appointments   Date Time Provider [address] [place] [address] Ct   12/13/2023  3:00 PM Sonja Stack.  SPA ANGELITO AUD  SPA   12/14/2023 To Be Determined Nan Koenig PT [facility]   12/15/2023 To Be Determined Doug Dang Adithya, RN [facility]   12/19/2023 To Be Determined Justine Bores, RN [facility]   12/22/2023 To Be Determined Justine Bores, RN [facility]   12/26/2023 To Be Determined Justine Bores, RN [facility]   12/29/2023  4:00 PM Nerissa Terry MD CARD BE [facility]   1/2/2024 To Be Determined Justine Bores, RN [facility]   1/4/2024  2:20 PM Nayana Aleman DO BM Cardio [facility]   1/8/2024  2:00 PM Manny Steele PA-C PULM COAL Practice-Hos   1/11/2024 11:00 AM Hayder Hyde PA-C CTR UR AL Practice-Dilip   1/16/2024  4:20 PM Janelle Stevens DO PALM PC Practice-Nor   2/15/2024  2:30 PM Hawk Skelton MD [address] [place] [address] ENT  SPA   3/4/2024  4:00 PM George Palacios PA-C GASTRO PALM Practice-Med   3/6/2024  2:00 PM Dilan Bergeron DPM POD PAL Practice-Ort          Janelle Stevens DO  [facility]

## 2023-12-14 ENCOUNTER — HOME CARE VISIT (OUTPATIENT)
Dept: HOME HEALTH SERVICES | Facility: HOME HEALTHCARE | Age: 78
End: 2023-12-14
Payer: COMMERCIAL

## 2023-12-14 VITALS — SYSTOLIC BLOOD PRESSURE: 140 MMHG | OXYGEN SATURATION: 92 % | HEART RATE: 64 BPM | DIASTOLIC BLOOD PRESSURE: 70 MMHG

## 2023-12-14 PROCEDURE — G0151 HHCP-SERV OF PT,EA 15 MIN: HCPCS

## 2023-12-15 ENCOUNTER — HOME CARE VISIT (OUTPATIENT)
Dept: HOME HEALTH SERVICES | Facility: HOME HEALTHCARE | Age: 78
End: 2023-12-15
Payer: COMMERCIAL

## 2023-12-15 PROCEDURE — G0299 HHS/HOSPICE OF RN EA 15 MIN: HCPCS

## 2023-12-15 NOTE — CASE COMMUNICATION
PT john on 12/14. PT POC for 1wk3 for gait/transfer training, edu, fall prevention, energy conservation, BLE ther ex for HEP.     Thank you, Eliana Forte PT

## 2023-12-18 ENCOUNTER — PATIENT OUTREACH (OUTPATIENT)
Dept: CASE MANAGEMENT | Facility: OTHER | Age: 78
End: 2023-12-18

## 2023-12-18 PROBLEM — J18.9 PNEUMONIA OF RIGHT LOWER LOBE DUE TO INFECTIOUS ORGANISM: Status: RESOLVED | Noted: 2023-03-15 | Resolved: 2023-12-18

## 2023-12-18 NOTE — PROGRESS NOTES
An attempt was made to contact Shlomo as a follow-up to the previous RT call. A detailed message was left on his  with contact information for a return call.

## 2023-12-19 ENCOUNTER — PATIENT MESSAGE (OUTPATIENT)
Dept: FAMILY MEDICINE CLINIC | Facility: CLINIC | Age: 78
End: 2023-12-19

## 2023-12-19 ENCOUNTER — HOME CARE VISIT (OUTPATIENT)
Dept: HOME HEALTH SERVICES | Facility: HOME HEALTHCARE | Age: 78
End: 2023-12-19
Payer: COMMERCIAL

## 2023-12-19 VITALS — DIASTOLIC BLOOD PRESSURE: 66 MMHG | SYSTOLIC BLOOD PRESSURE: 124 MMHG | OXYGEN SATURATION: 91 % | HEART RATE: 114 BPM

## 2023-12-19 VITALS
RESPIRATION RATE: 20 BRPM | OXYGEN SATURATION: 97 % | TEMPERATURE: 96.7 F | SYSTOLIC BLOOD PRESSURE: 92 MMHG | HEART RATE: 84 BPM | DIASTOLIC BLOOD PRESSURE: 60 MMHG

## 2023-12-19 DIAGNOSIS — J44.9 CHRONIC OBSTRUCTIVE PULMONARY DISEASE, UNSPECIFIED COPD TYPE (HCC): ICD-10-CM

## 2023-12-19 PROCEDURE — G0151 HHCP-SERV OF PT,EA 15 MIN: HCPCS

## 2023-12-20 ENCOUNTER — TELEPHONE (OUTPATIENT)
Dept: HOME HEALTH SERVICES | Facility: HOME HEALTHCARE | Age: 78
End: 2023-12-20

## 2023-12-20 ENCOUNTER — HOME CARE VISIT (OUTPATIENT)
Dept: HOME HEALTH SERVICES | Facility: HOME HEALTHCARE | Age: 78
End: 2023-12-20
Payer: COMMERCIAL

## 2023-12-20 PROCEDURE — G0155 HHCP-SVS OF CSW,EA 15 MIN: HCPCS

## 2023-12-22 ENCOUNTER — HOME CARE VISIT (OUTPATIENT)
Dept: HOME HEALTH SERVICES | Facility: HOME HEALTHCARE | Age: 78
End: 2023-12-22
Payer: COMMERCIAL

## 2023-12-22 PROCEDURE — G0299 HHS/HOSPICE OF RN EA 15 MIN: HCPCS

## 2023-12-22 RX ORDER — BUDESONIDE 0.5 MG/2ML
0.5 INHALANT ORAL 2 TIMES DAILY
Qty: 180 ML | Refills: 3 | Status: SHIPPED | OUTPATIENT
Start: 2023-12-22

## 2023-12-26 ENCOUNTER — TELEPHONE (OUTPATIENT)
Dept: OTHER | Facility: HOSPITAL | Age: 78
End: 2023-12-26

## 2023-12-26 ENCOUNTER — PATIENT OUTREACH (OUTPATIENT)
Dept: CASE MANAGEMENT | Facility: OTHER | Age: 78
End: 2023-12-26

## 2023-12-26 VITALS
TEMPERATURE: 97.2 F | RESPIRATION RATE: 22 BRPM | DIASTOLIC BLOOD PRESSURE: 60 MMHG | OXYGEN SATURATION: 89 % | SYSTOLIC BLOOD PRESSURE: 104 MMHG | HEART RATE: 96 BPM

## 2023-12-26 DIAGNOSIS — J44.1 COPD EXACERBATION (HCC): Primary | ICD-10-CM

## 2023-12-26 RX ORDER — DOXYCYCLINE 100 MG/1
100 CAPSULE ORAL 2 TIMES DAILY
Qty: 14 CAPSULE | Refills: 0 | Status: SHIPPED | OUTPATIENT
Start: 2023-12-26 | End: 2024-01-02

## 2023-12-26 NOTE — PROGRESS NOTES
.OP RT CM called and spoke with patient's daughter Nedra regarding Shlomo's  breathing, medications and O2.  His daughter stated he is having more SOB, chills and increased sputum. She is unsure of recent SpO2 or sputum color and consistency; however Nedra stated he is afebrile.     I in basket messaged provider and pulm; staff.  Patient has an upcoming appt. 1/9/24.  Shlomo is taking respiratory nebulized medications as prescribed per daughter. She stated he has the chills.     I will outreach next week and Nedra and family have my contact information.

## 2023-12-26 NOTE — TELEPHONE ENCOUNTER
Staff message sent from home health respiratory therapist stating that patient has had increased productive cough subjective chills and shortness of breath.  Will treat with 7 days of doxycycline. Please let patient know.

## 2023-12-27 ENCOUNTER — HOME CARE VISIT (OUTPATIENT)
Dept: HOME HEALTH SERVICES | Facility: HOME HEALTHCARE | Age: 78
End: 2023-12-27
Payer: COMMERCIAL

## 2023-12-27 VITALS — SYSTOLIC BLOOD PRESSURE: 98 MMHG | HEART RATE: 102 BPM | OXYGEN SATURATION: 87 % | DIASTOLIC BLOOD PRESSURE: 52 MMHG

## 2023-12-27 PROCEDURE — G0151 HHCP-SERV OF PT,EA 15 MIN: HCPCS

## 2023-12-28 ENCOUNTER — TELEPHONE (OUTPATIENT)
Dept: FAMILY MEDICINE CLINIC | Facility: CLINIC | Age: 78
End: 2023-12-28

## 2023-12-28 ENCOUNTER — HOME CARE VISIT (OUTPATIENT)
Dept: HOME HEALTH SERVICES | Facility: HOME HEALTHCARE | Age: 78
End: 2023-12-28
Payer: COMMERCIAL

## 2023-12-28 PROCEDURE — G0299 HHS/HOSPICE OF RN EA 15 MIN: HCPCS

## 2023-12-28 NOTE — TELEPHONE ENCOUNTER
Received voicemail from pt's son in law Mello, that patient accidentally took 4 of his wife's medications today.     Called Harini for more information. Patient accidentally took one tablet of Eliquis, one tablet of lexapro, one tablet of Lasix, and one tablet of atenolol (all of which are rx'd to his wife). He also took all of his normally rx'd medications including lasix.     Harini reports patient looks to be fine, no distress. , O2% is 98 on 3L O2.     Spoke with Dr. Clifford, wants pt to monitor HR and BP.     Harini notified, verbalized understanding. Will take him to ED if anything drastically changes.

## 2023-12-28 NOTE — CASE COMMUNICATION
Pt discharged from  physical Therapy at this time.  Pt is maneuvering throughout home independently with and without RW, but remains significantly limited due to respiratory status.  Pt reports he has been in contact with Pulmonary Rehab, but is unsure if he will schedule to go.   Pt with decreased SpO2 this visit.. pt ranged from 82-88% while on 3 Liters of oxygen.  Following activity there were periods of time when SpO2 dropped to 7 5%.  Pt also reporting newer onset pain to L side, directly below ribs and reports decrease in appetite.  Daughter present during visit and reports pt was put on steroids and antibiotics with family monitoring need for ER..declined ER evaluation during visit.

## 2023-12-28 NOTE — TELEPHONE ENCOUNTER
Spoke with patients daughter Harini, reports he is still sleeping yet. Will see how he is doing later after he is awake. Declined ED at this moment, will take him if O2 drops too low.

## 2023-12-28 NOTE — TELEPHONE ENCOUNTER
"Received this note from home PT-    \"Pt discharged from  physical Therapy at this time.  Pt is maneuvering throughout home independently with and without RW, but remains significantly limited due to respiratory status.  Pt reports he has been in contact with Pulmonary Rehab, but is unsure if he will schedule to go.   Pt with decreased SpO2 this visit.. pt ranged from 82-88% while on 3 Liters of oxygen.  Following activity there were periods of time when SpO2 dropped to 75%.  Pt also reporting newer onset pain to L side, directly below ribs and reports decrease in appetite.  Daughter present during visit and reports pt was put on steroids and antibiotics with family monitoring need for ER..declined ER evaluation during visit. \"    Please call to see how he's doing today, I agree with ED eval- thanks!   "

## 2023-12-28 NOTE — PROGRESS NOTES
Advanced Heart Failure/Pulmonary Hypertension Outpatient Note - Seun Alva 78 y.o. male MRN: [id number]    @ Encounter: [id number]    Assessment:  78 y.o. male Hx per chart p/w HF fu. COPD and ADHF admit 8/2023, Tx steroids. 12/2023 admit for +covid, Tx Remdesivir,  baricitinib, and Decadron, also COPD exacerbation.    Primary cardiologist is Dr. Sykes, general cardiology  Chronic HFrEF, LVEF 35% (40-45% since at least 2022, NST with EF 36% in 2021)  CAD, prior MI in 1995 with PCI to RCA, stenting in 1999 and in 2001 involving the RCA then again in 2003 with stenting to LAD, nonobstructive disease on cardiac catheterization 2021  HTN in past  HLD  PVCs, 2022 Holter with 9.2% ventricular ectopy  Severe COPD on chronic 3 L nasal cannula oxygen, he says he is being considered for [place] valve but his EF is too low as of 9/2023  LAMBERTO on cpap      I have reviewed all pertinent patient data including but not limited to:    Lab Results   Component Value Date    CREATININE 1.16 12/29/2023     Lab Results   Component Value Date    K 2.9 (L) 12/29/2023     Lab Results   Component Value Date    HGBA1C 6.5 (H) 09/02/2023     Lab Results   Component Value Date    FQS2EHFXWRHT 0.583 05/26/2023     Lab Results   Component Value Date    LDLCALC 52 09/02/2023     Lab Results   Component Value Date    BNP 1,892 (H) 10/21/2023      Lab Results   Component Value Date    NTBNP 239 11/15/2022      Home dry weight 180lbs    TODAY'S PLAN:     12/29/23  Warm, suspect intravascularly euvolemic, note peripheral edema  His PCP called him earlier today regarding 12/29 labs with hypoK - he was advised to go to ED for K repletion and further evaluation - he plans to go there immediately after this visit  No new cardiac complaints, no new palpitations, no new SOB recently  Note his Recent covid and copd exacerbation admit    At prior visits we had attempted to cautiously escalate his gdmt, gently given many limiting factors  Today it appears  he may never have been able to start entresto, his prior aldactone is not being taken, his metoprolol is dosed somewhat differently  He remains on jardiance    His ability to tolerate gdmt is very limited    Once acute events treated, can re-attempt cautious escalation of gdmt; would start w aldactone resumption when appropriate  This can be undertaken at serial visits with his primary cardiology team  Progress will likely be limited unfortunately  Unfortunately, the patient has an advanced cardiomyopathy and severe multiorgan dz with multiple higher risk markers and without reasonable options for major treatment escalation of his cardiac conditions. Not a VAD or OHT candidate.  GOC planning indicated.    gdmt below  BP historically limiting  No changes today  Would Repeat echo in 2024 after 3 mo on maximally tolerated GDMT  Note Mild MR    On ASA and statin    Key info from my prior notes:    No overt evidence worse ischemia now    If EF does not recover, consider repeat holter and reassess PVC burden    I am meeting patient for the first time today  Warm, euvolemic  On O2, severe COPD, he says he is being considered for [place] valve but his EF is too low as of 9/2023, needs to be >40% per pt  No new cardiac Sx recently, unchanged SOB and weight at home  Hypotension has been somewhat limiting for GDMT recently    Neurohormonal Blockade/GDMT:  --Beta-Blocker: toprol xl 12.5 qd>25 qd>12.5 bid, caution severe COPD  --ACEi, ARB, ARNi: entresto 24/26 bid was never started  He notes past cough to lisinopril (no swelling or SOB just cough - he is very specific about this), he says he tolerated past losartan without issue and was stopped for reasons he does not recall but had no medical issue with it he knows  --MRA: aldactone 25 qd>stopped by another provider  --SGLT2i: jardiance 10 qd; discussed risks/benefits/red flags/when to call us; no overt contraindications  --Hydralazine/nitrates: none    --Diuretic: lasix 20  qd  kdur bid>now kdur q48hr when started new aldactone  May need further dose reduction in future now that advancing gdmt 2023    Other HF pharmaco-invasive therapy (if applicable):   PPM,  ICD , CRT (if applicable):  Interrogation:  Advanced Therapies (if applicable):   --Inotrope:  --LVAD/Transplant Candidacy: unfortunately not a candidate if ever indicated    Studies:  I have reviewed all pertinent patient data/labs/imaging where available, including but not limited to the below studies. Selected results may be displayed here but comprehensive listing is omitted for note clarity and can be found in the epic chart.    ECG.    Echo.    Stress.    Cath.    HPI:   78 y.o. male Hx per chart p/w HF fu. COPD and ADHF admit 8/2023, Tx steroids.  No new CP/SOB/dizziness/palpitations/syncope.  No new fatigue.  No new unintentional weight changes.  No new leg swelling, PND, pillow orthopnea.  No new fevers, chills, cough, nausea, vomiting, diarrhea, dysuria.      Interval History:   As noted in 'plan' section above and prior epic chart notes.    No new CP/SOB/dizziness/palpitations/syncope.  No new fatigue.  No new unintentional weight changes.  No new leg swelling, PND, pillow orthopnea.  No new fevers, chills, cough, nausea, vomiting, diarrhea, dysuria.    Past Medical History:   Diagnosis Date    Abnormal ECG 2021 OR 2022    Alcoholism (HCC) 1984    sober 38 years    Arthritis 2008    beginning    Bilateral carotid artery stenosis     Callus     Cancer (HCC)     skin    Chronic diastolic heart failure (HCC)     Chronic ischemic heart disease     Chronic kidney disease     stage 3    Colon polyp     COPD (chronic obstructive pulmonary disease) (HCC)     Coronary artery disease     hx stents, MI, PCI    COVID 11/2021    COVID 12/01/2023    CPAP (continuous positive airway pressure) dependence     Disease of thyroid gland 2017    nodules    Emphysema of lung (HCC) 1995    started    Hearing loss     History of transfusion  1995    Hyperlipidemia     Hypertension     Intracranial aneurysm 04/25/2023    Lung nodule 2023    x rays    MI (myocardial infarction) ([facility]) 1995    Myocardial infarction ([facility]) 1995    Pneumonia     Pneumothorax 02/20/2023    collapsed lung    Prostate cancer ([facility])     RSV (respiratory syncytial virus infection) 10/2022    S/P carotid endarterectomy     Shortness of breath     O2 2 l/nc PRN    Sleep apnea     Sleep apnea, obstructive     Stented coronary artery      Patient Active Problem List    Diagnosis Date Noted    Left eye pain 12/13/2023    Intermittent constipation 12/13/2023    Chronic systolic congestive heart failure ([facility]) 10/22/2023    Erectile dysfunction 08/16/2023    Other disorders of refraction 08/16/2023    Occlusion and stenosis of unspecified carotid artery 08/16/2023    Dysphagia 05/04/2023    Congestive heart failure with left ventricular systolic dysfunction (LVSD) ([facility]) 04/28/2023    Hypoxemia 04/28/2023    Intracranial aneurysm 04/25/2023    Allergic rhinitis, unspecified 04/17/2023    Chronic kidney disease, stage 3a ([facility]) 04/17/2023    Generalized muscle weakness 04/17/2023    Hypertensive heart and chronic kidney disease with heart failure and stage 1 through stage 4 chronic kidney disease, or unspecified chronic kidney disease ([facility]) 04/17/2023    Need for assistance with personal care 04/17/2023    Other abnormalities of gait and mobility 04/17/2023    Retention of urine, unspecified 04/17/2023    Sudden idiopathic hearing loss, right ear 04/17/2023    Acute and chronic respiratory failure with hypoxia ([facility]) 04/17/2023    Elevated troponin 04/04/2023    Orthopnea 03/21/2023    Lower extremity edema 03/21/2023    Irregular heart rhythm 03/21/2023    Leukocytosis 02/21/2023    Left leg pain 01/25/2023    Transient right leg weakness 01/25/2023    Hypokalemia 01/11/2023    COVID-19 01/09/2023    Ambulatory dysfunction 01/09/2023    COPD (chronic obstructive pulmonary disease) ([facility])  01/09/2023    Sensorineural hearing loss, bilateral 10/19/2022    Chronic respiratory failure with hypoxia (HCC) 10/15/2022    Prostate cancer (HCC) 09/27/2022    Elevated MCV 09/08/2022    Cubital tunnel syndrome on left 07/22/2022    Carpal tunnel syndrome on left 07/22/2022    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    Urinary frequency 04/27/2022    Incomplete bladder emptying 04/27/2022    Chronic bilateral low back pain with right-sided sciatica 04/18/2022    Mid back pain 04/18/2022    Thoracic radiculopathy 04/18/2022    Chronic pain syndrome 04/18/2022    Hoarseness or changing voice 04/14/2022    Chronic right-sided thoracic back pain 03/05/2022    Primary insomnia 03/02/2022    Right hip pain 01/20/2022    Abnormal nuclear stress test 03/18/2021    Costochondral chest pain 01/15/2021    COPD with acute exacerbation (HCC) 01/11/2021    Oxygen dependent 01/11/2021    S/P carotid endarterectomy 01/11/2021    Stented coronary artery 01/11/2021    Vertigo 01/11/2021    Anisometropia 01/11/2021    Osteoarthritis of spinal facet joint 01/11/2021    Chronic ischemic heart disease 01/11/2021    Diverticular disease of colon 01/11/2021    Dry eye syndrome 01/11/2021    GERD (gastroesophageal reflux disease) 01/11/2021    Acute hearing loss, right 01/11/2021    Elevated PSA 01/11/2021    Hypoxia 01/11/2021    Lens replaced by other means 01/11/2021    Lumbar radiculopathy 01/11/2021    Multinodular goiter 01/11/2021    Nuclear senile cataract 01/11/2021    Obesity 01/11/2021    Occlusion and stenosis of carotid artery 01/11/2021    Osteoarthritis of hip 01/11/2021    Prediabetes 01/11/2021    LAMBERTO on CPAP 01/11/2021    Solitary pulmonary nodule 01/11/2021    Thyroid nodule 01/11/2021    [place]on syndrome, 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    Depression, recurrent (HCC)     Hyperlipidemia     Coronary artery disease involving native coronary artery of native heart without angina pectoris     Left carotid artery occlusion        ROS:  10 point  ROS negative except as specified in HPI/interval history    Allergies   Allergen Reactions    Lisinopril Swelling and Cough    Tetanus Antitoxin Anaphylaxis    Tetanus Toxoid Anaphylaxis and Swelling       Current Outpatient Medications   Medication Instructions    ALPRAZolam (XANAX) 0.25 mg, Oral, Daily at bedtime PRN    aspirin 81 mg, Daily at bedtime    azithromycin (ZITHROMAX) 250 mg tablet Take 2 tablets today then 1 tablet daily x 4 days    bacitracin ophthalmic ointment     budesonide (PULMICORT) 0.5 mg, Nebulization, 2 times daily, Rinse mouth after use.    cholecalciferol (VITAMIN D3) 1,000 Units, Oral, Daily    dextromethorphan-guaiFENesin (ROBITUSSIN DM)  mg/5 mL syrup 5 mL, Oral, 3 times daily PRN    doxycycline monohydrate (MONODOX) 100 mg, Oral, 2 times daily    Empagliflozin (JARDIANCE) 10 mg, Oral, Every morning    famotidine (PEPCID) 20 mg tablet TAKE 1 TABLET BY MOUTH DAILY AT  BEDTIME    fluticasone (FLONASE) 50 mcg/act nasal spray 1 spray, Nasal, 2 times daily    formoterol (PERFOROMIST) 20 mcg, Nebulization, 2 times daily    furosemide (LASIX) 20 mg, Oral, Daily    gabapentin (NEURONTIN) 300 mg, Oral, Daily at bedtime    guaiFENesin (MUCINEX) 600 mg, Oral, 2 times daily    Humidifier MISC Does not apply, Continuous    HYDROcodone-acetaminophen (Norco) 5-325 mg per tablet 1 tablet, Oral, Every 6 hours PRN    ipratropium-albuterol (DUO-NEB) 0.5-2.5 mg/3 mL nebulizer solution No dose, route, or frequency recorded.    methocarbamol (ROBAXIN) 500 mg, Oral, 3 times daily PRN    metoprolol succinate (TOPROL-XL) 12.5 mg, Oral, 2 times daily    naloxone (NARCAN) 4 mg/0.1 mL nasal spray Administer 1 spray into a nostril. If no response after 2-3 minutes, give another dose in the other nostril using a new spray.    nystatin (MYCOSTATIN) 500,000 units/5 mL suspension 5 ml orally (swish and spit/swallow) every four hours while awake    omeprazole (PRILOSEC) 20 mg, Oral, Daily    oxygen 2 L/min,  Inhalation, Continuous, 2LPM at rest and 3-4 LPM with activity per D Cedric FANG notes    potassium chloride (K-DUR,KLOR-CON) 20 mEq tablet 20 mEq, Oral, Every other day    predniSONE 10 mg tablet 4 tabs x 5 days    rosuvastatin (CRESTOR) 40 MG tablet TAKE 1 TABLET DAILY    sodium chloride (OCEAN) 0.65 % nasal spray 1 spray, Nasal, Every 1 hour PRN        Social History     Socioeconomic History    Marital status: /Civil Union     Spouse name: Not on file    Number of children: Not on file    Years of education: Not on file    Highest education level: Not on file   Occupational History    Not on file   Tobacco Use    Smoking status: Former     Current packs/day: 0.00     Average packs/day: 2.0 packs/day for 35.5 years (71.0 ttl pk-yrs)     Types: Cigarettes     Start date: 1960     Quit date: 1995     Years since quittin.4    Smokeless tobacco: Never    Tobacco comments:     stopped smoking the day i had a heart attack   Vaping Use    Vaping status: Never Used   Substance and Sexual Activity    Alcohol use: Not Currently    Drug use: Never    Sexual activity: Not Currently     Partners: Female     Birth control/protection: None   Other Topics Concern    Not on file   Social History Narrative    Not on file     Social Determinants of Health     Financial Resource Strain: Patient Declined (2023)    Overall Financial Resource Strain (CARDIA)     Difficulty of Paying Living Expenses: Patient declined   Food Insecurity: No Food Insecurity (10/19/2023)    Hunger Vital Sign     Worried About Running Out of Food in the Last Year: Never true     Ran Out of Food in the Last Year: Never true   Transportation Needs: No Transportation Needs (10/19/2023)    PRAPARE - Transportation     Lack of Transportation (Medical): No     Lack of Transportation (Non-Medical): No   Physical Activity: Not on file   Stress: Not on file   Social Connections: Not on file   Intimate Partner Violence: Not At Risk (2021)  "   [facility]    Interpersonal Safety     Social Determinants: Intimate Partner Violence Past Fear: Not on file     Social Determinants: Intimate Partner Violence Current Fear: Not on file   Housing Stability: Low Risk  (10/19/2023)    Housing Stability Vital Sign     Unable to Pay for Housing in the Last Year: No     Number of Places Lived in the Last Year: 1     Unstable Housing in the Last Year: No       Family History   Problem Relation Age of Onset    Heart attack Mother     Dementia Mother     Heart failure Mother          2006    Heart disease Mother         heart attacks (2)    No Known Problems Father      Physical Exam:  Vitals:    23 1602   BP: 98/60   BP Location: Right arm   Patient Position: Sitting   Cuff Size: Standard   Pulse: (!) 110   SpO2: 93%   Weight: 79.9 kg (176 lb 1.6 oz)   Height: 5' 8\" (1.727 m)     Constitutional: NAD, frail  Ears/nose/mouth/throat: atraumatic  CV: irreg, no JVD  Resp: Decreased breath sounds BL  GI: Soft, NTND  MSK: no swollen joints in exposed areas  Extr: +1 LE edema, warm LE  Pysche: Normal affect  Neuro: appropriate in conversation  Skin: dry and intact in exposed areas    Labs & Results:    Lab Results   Component Value Date    SODIUM 142 2023    K 2.9 (L) 2023    CL 97 2023    CO2 33 (H) 2023    BUN 19 2023    CREATININE 1.16 2023    GLUC 92 2023    CALCIUM 9.0 2023     Lab Results   Component Value Date    WBC 8.52 2023    HGB 13.1 2023    HCT 41.8 2023     (H) 2023     2023     Lab Results   Component Value Date    BNP 1,892 (H) 10/21/2023      Lab Results   Component Value Date    CHOLESTEROL 159 2023    CHOLESTEROL 132 2023    CHOLESTEROL 110 2022     Lab Results   Component Value Date    HDL 73 2023    HDL 69 2023    HDL 53 2022     Lab Results   Component Value Date    TRIG 171 (H) 2023    " TRIG 90 01/27/2023    TRIG 62 04/18/2022     Lab Results   Component Value Date    NONHDLC 70 02/22/2021       Counseling / Coordination of Care  Time spent today 27 minutes.  Greater than 50% of total time was spent with the patient and / or family counseling and / or coordination of care.  We discussed diagnoses, most recent studies, tests and any changes in treatment plan.    Thank you for the opportunity to participate in the care of this patient.    Bar Powell MD  Attending Physician  Advanced Heart Failure and Transplant Cardiology  [facility]

## 2023-12-29 ENCOUNTER — HOSPITAL ENCOUNTER (EMERGENCY)
Facility: HOSPITAL | Age: 78
Discharge: HOME | End: 2023-12-29
Attending: EMERGENCY MEDICINE
Payer: COMMERCIAL

## 2023-12-29 ENCOUNTER — APPOINTMENT (OUTPATIENT)
Dept: LAB | Facility: HOSPITAL | Age: 78
End: 2023-12-29
Payer: COMMERCIAL

## 2023-12-29 ENCOUNTER — OFFICE VISIT (OUTPATIENT)
Dept: CARDIOLOGY CLINIC | Facility: CLINIC | Age: 78
End: 2023-12-29
Payer: COMMERCIAL

## 2023-12-29 VITALS
BODY MASS INDEX: 26.69 KG/M2 | HEIGHT: 68 IN | HEART RATE: 110 BPM | WEIGHT: 176.1 LBS | OXYGEN SATURATION: 93 % | SYSTOLIC BLOOD PRESSURE: 98 MMHG | DIASTOLIC BLOOD PRESSURE: 60 MMHG

## 2023-12-29 VITALS
DIASTOLIC BLOOD PRESSURE: 64 MMHG | HEART RATE: 100 BPM | SYSTOLIC BLOOD PRESSURE: 100 MMHG | OXYGEN SATURATION: 98 % | TEMPERATURE: 98.8 F | RESPIRATION RATE: 18 BRPM

## 2023-12-29 DIAGNOSIS — I25.10 CORONARY ARTERY DISEASE INVOLVING NATIVE CORONARY ARTERY OF NATIVE HEART WITHOUT ANGINA PECTORIS: ICD-10-CM

## 2023-12-29 DIAGNOSIS — I35.0 AORTIC VALVE STENOSIS, ETIOLOGY OF CARDIAC VALVE DISEASE UNSPECIFIED: ICD-10-CM

## 2023-12-29 DIAGNOSIS — I50.20 HFREF (HEART FAILURE WITH REDUCED EJECTION FRACTION) (HCC): Primary | ICD-10-CM

## 2023-12-29 DIAGNOSIS — E83.42 HYPOMAGNESEMIA: ICD-10-CM

## 2023-12-29 DIAGNOSIS — E53.8 B12 DEFICIENCY: ICD-10-CM

## 2023-12-29 DIAGNOSIS — D72.829 LEUKOCYTOSIS, UNSPECIFIED TYPE: ICD-10-CM

## 2023-12-29 DIAGNOSIS — I49.3 PVC (PREMATURE VENTRICULAR CONTRACTION): ICD-10-CM

## 2023-12-29 DIAGNOSIS — J96.21 ACUTE AND CHRONIC RESPIRATORY FAILURE WITH HYPOXIA (HCC): ICD-10-CM

## 2023-12-29 DIAGNOSIS — E87.6 HYPOKALEMIA: Primary | ICD-10-CM

## 2023-12-29 DIAGNOSIS — I42.9 CARDIOMYOPATHY, UNSPECIFIED TYPE (HCC): ICD-10-CM

## 2023-12-29 DIAGNOSIS — J44.1 COPD WITH ACUTE EXACERBATION (HCC): ICD-10-CM

## 2023-12-29 DIAGNOSIS — R71.8 ELEVATED MCV: ICD-10-CM

## 2023-12-29 LAB
ALBUMIN SERPL BCP-MCNC: 4 G/DL (ref 3.5–5)
ALP SERPL-CCNC: 59 U/L (ref 34–104)
ALT SERPL W P-5'-P-CCNC: 24 U/L (ref 7–52)
ANION GAP SERPL CALCULATED.3IONS-SCNC: 10 MMOL/L
ANION GAP SERPL CALCULATED.3IONS-SCNC: 12 MMOL/L
AST SERPL W P-5'-P-CCNC: 27 U/L (ref 13–39)
BASOPHILS # BLD AUTO: 0 THOUSANDS/ÂΜL (ref 0–0.1)
BASOPHILS # BLD AUTO: 0.01 THOUSANDS/ÂΜL (ref 0–0.1)
BASOPHILS NFR BLD AUTO: 0 % (ref 0–1)
BASOPHILS NFR BLD AUTO: 0 % (ref 0–1)
BILIRUB SERPL-MCNC: 0.64 MG/DL (ref 0.2–1)
BUN SERPL-MCNC: 19 MG/DL (ref 5–25)
BUN SERPL-MCNC: 20 MG/DL (ref 5–25)
CALCIUM SERPL-MCNC: 9 MG/DL (ref 8.4–10.2)
CALCIUM SERPL-MCNC: 9.2 MG/DL (ref 8.4–10.2)
CHLORIDE SERPL-SCNC: 97 MMOL/L (ref 96–108)
CHLORIDE SERPL-SCNC: 98 MMOL/L (ref 96–108)
CO2 SERPL-SCNC: 33 MMOL/L (ref 21–32)
CO2 SERPL-SCNC: 33 MMOL/L (ref 21–32)
CREAT SERPL-MCNC: 1.16 MG/DL (ref 0.6–1.3)
CREAT SERPL-MCNC: 1.22 MG/DL (ref 0.6–1.3)
EOSINOPHIL # BLD AUTO: 0 THOUSAND/ÂΜL (ref 0–0.61)
EOSINOPHIL # BLD AUTO: 0.02 THOUSAND/ÂΜL (ref 0–0.61)
EOSINOPHIL NFR BLD AUTO: 0 % (ref 0–6)
EOSINOPHIL NFR BLD AUTO: 0 % (ref 0–6)
ERYTHROCYTE [DISTWIDTH] IN BLOOD BY AUTOMATED COUNT: 16.6 % (ref 11.6–15.1)
ERYTHROCYTE [DISTWIDTH] IN BLOOD BY AUTOMATED COUNT: 16.7 % (ref 11.6–15.1)
GFR SERPL CREATININE-BSD FRML MDRD: 56 ML/MIN/1.73SQ M
GFR SERPL CREATININE-BSD FRML MDRD: 59 ML/MIN/1.73SQ M
GLUCOSE P FAST SERPL-MCNC: 102 MG/DL (ref 65–99)
GLUCOSE SERPL-MCNC: 124 MG/DL (ref 65–140)
HCT VFR BLD AUTO: 41.7 % (ref 36.5–49.3)
HCT VFR BLD AUTO: 41.8 % (ref 36.5–49.3)
HGB BLD-MCNC: 13.1 G/DL (ref 12–17)
HGB BLD-MCNC: 13.2 G/DL (ref 12–17)
IMM GRANULOCYTES # BLD AUTO: 0.02 THOUSAND/UL (ref 0–0.2)
IMM GRANULOCYTES # BLD AUTO: 0.03 THOUSAND/UL (ref 0–0.2)
IMM GRANULOCYTES NFR BLD AUTO: 0 % (ref 0–2)
IMM GRANULOCYTES NFR BLD AUTO: 0 % (ref 0–2)
LYMPHOCYTES # BLD AUTO: 0.26 THOUSANDS/ÂΜL (ref 0.6–4.47)
LYMPHOCYTES # BLD AUTO: 0.3 THOUSANDS/ÂΜL (ref 0.6–4.47)
LYMPHOCYTES NFR BLD AUTO: 3 % (ref 14–44)
LYMPHOCYTES NFR BLD AUTO: 4 % (ref 14–44)
MAGNESIUM SERPL-MCNC: 1.8 MG/DL (ref 1.9–2.7)
MCH RBC QN AUTO: 31.9 PG (ref 26.8–34.3)
MCH RBC QN AUTO: 32.1 PG (ref 26.8–34.3)
MCHC RBC AUTO-ENTMCNC: 31.3 G/DL (ref 31.4–37.4)
MCHC RBC AUTO-ENTMCNC: 31.7 G/DL (ref 31.4–37.4)
MCV RBC AUTO: 102 FL (ref 82–98)
MCV RBC AUTO: 102 FL (ref 82–98)
MONOCYTES # BLD AUTO: 0.62 THOUSAND/ÂΜL (ref 0.17–1.22)
MONOCYTES # BLD AUTO: 0.79 THOUSAND/ÂΜL (ref 0.17–1.22)
MONOCYTES NFR BLD AUTO: 9 % (ref 4–12)
MONOCYTES NFR BLD AUTO: 9 % (ref 4–12)
NEUTROPHILS # BLD AUTO: 6.17 THOUSANDS/ÂΜL (ref 1.85–7.62)
NEUTROPHILS # BLD AUTO: 7.41 THOUSANDS/ÂΜL (ref 1.85–7.62)
NEUTS SEG NFR BLD AUTO: 87 % (ref 43–75)
NEUTS SEG NFR BLD AUTO: 88 % (ref 43–75)
NRBC BLD AUTO-RTO: 0 /100 WBCS
NRBC BLD AUTO-RTO: 0 /100 WBCS
PLATELET # BLD AUTO: 230 THOUSANDS/UL (ref 149–390)
PLATELET # BLD AUTO: 231 THOUSANDS/UL (ref 149–390)
PMV BLD AUTO: 10.6 FL (ref 8.9–12.7)
PMV BLD AUTO: 10.9 FL (ref 8.9–12.7)
POTASSIUM SERPL-SCNC: 2.9 MMOL/L (ref 3.5–5.3)
POTASSIUM SERPL-SCNC: 3.1 MMOL/L (ref 3.5–5.3)
PROT SERPL-MCNC: 6.2 G/DL (ref 6.4–8.4)
RBC # BLD AUTO: 4.11 MILLION/UL (ref 3.88–5.62)
RBC # BLD AUTO: 4.11 MILLION/UL (ref 3.88–5.62)
SODIUM SERPL-SCNC: 141 MMOL/L (ref 135–147)
SODIUM SERPL-SCNC: 142 MMOL/L (ref 135–147)
VIT B12 SERPL-MCNC: 696 PG/ML (ref 180–914)
WBC # BLD AUTO: 7.11 THOUSAND/UL (ref 4.31–10.16)
WBC # BLD AUTO: 8.52 THOUSAND/UL (ref 4.31–10.16)

## 2023-12-29 PROCEDURE — 80048 BASIC METABOLIC PNL TOTAL CA: CPT | Performed by: EMERGENCY MEDICINE

## 2023-12-29 PROCEDURE — 99214 OFFICE O/P EST MOD 30 MIN: CPT | Performed by: STUDENT IN AN ORGANIZED HEALTH CARE EDUCATION/TRAINING PROGRAM

## 2023-12-29 PROCEDURE — 85025 COMPLETE CBC W/AUTO DIFF WBC: CPT | Performed by: EMERGENCY MEDICINE

## 2023-12-29 PROCEDURE — 99285 EMERGENCY DEPT VISIT HI MDM: CPT

## 2023-12-29 PROCEDURE — 99285 EMERGENCY DEPT VISIT HI MDM: CPT | Performed by: EMERGENCY MEDICINE

## 2023-12-29 PROCEDURE — 96365 THER/PROPH/DIAG IV INF INIT: CPT

## 2023-12-29 PROCEDURE — 93005 ELECTROCARDIOGRAM TRACING: CPT

## 2023-12-29 PROCEDURE — 36415 COLL VENOUS BLD VENIPUNCTURE: CPT

## 2023-12-29 PROCEDURE — 83735 ASSAY OF MAGNESIUM: CPT | Performed by: EMERGENCY MEDICINE

## 2023-12-29 PROCEDURE — 80053 COMPREHEN METABOLIC PANEL: CPT

## 2023-12-29 PROCEDURE — 85025 COMPLETE CBC W/AUTO DIFF WBC: CPT

## 2023-12-29 PROCEDURE — 82607 VITAMIN B-12: CPT

## 2023-12-29 RX ORDER — POTASSIUM CHLORIDE 14.9 MG/ML
20 INJECTION INTRAVENOUS ONCE
Status: DISCONTINUED | OUTPATIENT
Start: 2023-12-29 | End: 2023-12-29

## 2023-12-29 RX ORDER — MAGNESIUM SULFATE HEPTAHYDRATE 40 MG/ML
2 INJECTION, SOLUTION INTRAVENOUS ONCE
Status: COMPLETED | OUTPATIENT
Start: 2023-12-29 | End: 2023-12-29

## 2023-12-29 RX ORDER — POTASSIUM CHLORIDE 20 MEQ/1
40 TABLET, EXTENDED RELEASE ORAL ONCE
Status: COMPLETED | OUTPATIENT
Start: 2023-12-29 | End: 2023-12-29

## 2023-12-29 RX ORDER — FLUTICASONE PROPIONATE 50 MCG
1 SPRAY, SUSPENSION (ML) NASAL ONCE
Status: COMPLETED | OUTPATIENT
Start: 2023-12-29 | End: 2023-12-29

## 2023-12-29 RX ADMIN — FLUTICASONE PROPIONATE 1 SPRAY: 50 SPRAY, METERED NASAL at 19:41

## 2023-12-29 RX ADMIN — MAGNESIUM SULFATE HEPTAHYDRATE 2 G: 40 INJECTION, SOLUTION INTRAVENOUS at 19:42

## 2023-12-29 RX ADMIN — POTASSIUM CHLORIDE 40 MEQ: 1500 TABLET, EXTENDED RELEASE ORAL at 19:41

## 2023-12-29 RX ADMIN — SODIUM CHLORIDE 500 ML: 0.9 INJECTION, SOLUTION INTRAVENOUS at 19:42

## 2023-12-30 ENCOUNTER — PATIENT MESSAGE (OUTPATIENT)
Dept: FAMILY MEDICINE CLINIC | Facility: CLINIC | Age: 78
End: 2023-12-30

## 2023-12-30 DIAGNOSIS — I50.20 CONGESTIVE HEART FAILURE WITH LEFT VENTRICULAR SYSTOLIC DYSFUNCTION (LVSD) (HCC): Primary | ICD-10-CM

## 2023-12-30 NOTE — ED PROVIDER NOTES
History  Chief Complaint   Patient presents with    Abnormal Lab     hypokalemia     Patient is a 78-year-old male with history of CHF on Lasix that presents for evaluation of asymptomatic hyperkalemia.  Outpatient blood work shows that the potassium was 2.9.  He does take potassium repletion 3 times a week.  He did accidentally take an extra dose of Lasix yesterday.  He otherwise denies complaints including headache chest pain dyspnea abdominal pain.        Prior to Admission Medications   Prescriptions Last Dose Informant Patient Reported? Taking?   ALPRAZolam (XANAX) 0.25 mg tablet  Self, Spouse/Significant Other No No   Sig: Take 1 tablet (0.25 mg total) by mouth daily at bedtime as needed for anxiety   Empagliflozin (Jardiance) 10 MG TABS tablet  Self, Spouse/Significant Other No No   Sig: Take 1 tablet (10 mg total) by mouth every morning   HYDROcodone-acetaminophen (Norco) 5-325 mg per tablet  Spouse/Significant Other, Self No No   Sig: Take 1 tablet by mouth every 6 (six) hours as needed for pain Max Daily Amount: 4 tablets   Humidifier MISC  Self, Spouse/Significant Other No No   Sig: Use continuous   aspirin 81 mg chewable tablet  Spouse/Significant Other, Self Yes No   Si mg daily at bedtime   azithromycin (ZITHROMAX) 250 mg tablet  Self, Spouse/Significant Other No No   Sig: Take 2 tablets today then 1 tablet daily x 4 days   bacitracin ophthalmic ointment  Self, Spouse/Significant Other Yes No   budesonide (PULMICORT) 0.5 mg/2 mL nebulizer solution  Self, Spouse/Significant Other No No   Sig: Take 2 mL (0.5 mg total) by nebulization 2 (two) times a day Rinse mouth after use.   cholecalciferol (VITAMIN D3) 1,000 units tablet  Self, Spouse/Significant Other Yes No   Sig: Take 1,000 Units by mouth daily   dextromethorphan-guaiFENesin (ROBITUSSIN DM)  mg/5 mL syrup  Spouse/Significant Other, Self No No   Sig: Take 5 mL by mouth 3 (three) times a day as needed for cough   doxycycline monohydrate  (MONODOX) 100 mg capsule  Self, Spouse/Significant Other No No   Sig: Take 1 capsule (100 mg total) by mouth 2 (two) times a day for 7 days   famotidine (PEPCID) 20 mg tablet  Spouse/Significant Other, Self No No   Sig: TAKE 1 TABLET BY MOUTH DAILY AT  BEDTIME   fluticasone (FLONASE) 50 mcg/act nasal spray  Self, Spouse/Significant Other No No   Si spray into each nostril 2 (two) times a day   formoterol (PERFOROMIST) 20 MCG/2ML nebulizer solution  Self, Spouse/Significant Other No No   Sig: Take 2 mL (20 mcg total) by nebulization 2 (two) times a day   furosemide (LASIX) 40 mg tablet  Self, Spouse/Significant Other No No   Sig: Take 0.5 tablets (20 mg total) by mouth daily   gabapentin (NEURONTIN) 300 mg capsule  Self, Spouse/Significant Other No No   Sig: Take 1 capsule (300 mg total) by mouth daily at bedtime   guaiFENesin (MUCINEX) 600 mg 12 hr tablet  Self, Spouse/Significant Other No No   Sig: Take 1 tablet (600 mg total) by mouth 2 (two) times a day   ipratropium-albuterol (DUO-NEB) 0.5-2.5 mg/3 mL nebulizer solution  Self, Spouse/Significant Other Yes No   methocarbamol (ROBAXIN) 500 mg tablet  Spouse/Significant Other, Self No No   Sig: Take 1 tablet (500 mg total) by mouth 3 (three) times a day as needed for muscle spasms   metoprolol succinate (TOPROL-XL) 25 mg 24 hr tablet  Self, Spouse/Significant Other No No   Sig: Take 0.5 tablets (12.5 mg total) by mouth 2 (two) times a day   naloxone (NARCAN) 4 mg/0.1 mL nasal spray  Spouse/Significant Other, Self No No   Sig: Administer 1 spray into a nostril. If no response after 2-3 minutes, give another dose in the other nostril using a new spray.   nystatin (MYCOSTATIN) 500,000 units/5 mL suspension  Self, Spouse/Significant Other No No   Si ml orally (swish and spit/swallow) every four hours while awake   omeprazole (PriLOSEC) 20 mg delayed release capsule  Self, Spouse/Significant Other No No   Sig: take 1 capsule by mouth once daily   oxygen gas   Spouse/Significant Other, Self Yes No   Sig: Inhale 2 L/min continuous 2LPM at rest and 3-4 LPM with activity per D Cedric FANG notes   potassium chloride (K-DUR,KLOR-CON) 20 mEq tablet  Self, Spouse/Significant Other Yes No   Sig: Take 20 mEq by mouth every other day   predniSONE 10 mg tablet  Self, Spouse/Significant Other No No   Si tabs x 5 days   rosuvastatin (CRESTOR) 40 MG tablet  Spouse/Significant Other, Self No No   Sig: TAKE 1 TABLET DAILY   Patient taking differently: Take 40 mg by mouth daily   sodium chloride (OCEAN) 0.65 % nasal spray  Self, Spouse/Significant Other No No   Si spray into each nostril every hour as needed for congestion      Facility-Administered Medications: None       Past Medical History:   Diagnosis Date    Abnormal ECG  OR     Alcoholism (Formerly [facility]) 1984    sober 38 years    Arthritis 2008    beginning    Bilateral carotid artery stenosis     Callus     Cancer (Formerly [facility])     skin    Chronic diastolic heart failure ([facility]ly [facility])     Chronic ischemic heart disease     Chronic kidney disease     stage 3    Colon polyp     COPD (chronic obstructive pulmonary disease) (Formerly [facility])     Coronary artery disease     hx stents, MI, PCI    COVID 2021    COVID 2023    CPAP (continuous positive airway pressure) dependence     Disease of thyroid gland     nodules    Emphysema of lung ([facility])     started    Hearing loss     History of transfusion     Hyperlipidemia     Hypertension     Intracranial aneurysm 2023    Lung nodule     x rays    MI (myocardial infarction) ([facility])     Myocardial infarction ([facility])     Pneumonia     Pneumothorax 2023    collapsed lung    Prostate cancer ([facility])     RSV (respiratory syncytial virus infection) 10/2022    S/P carotid endarterectomy     Shortness of breath     O2 2 l/nc PRN    Sleep apnea     Sleep apnea, obstructive     Stented coronary artery        Past Surgical History:   Procedure Laterality Date    APPENDECTOMY      CARDIAC  CATHETERIZATION  2021    left main with no significant disease, proximal LAD with 10% stenosis at the site of prior stent, left circumflex artery with minimal luminal irregularities mid RCA with 50% stenosis at site of prior stent and PL segment with distal disease supplied by collaterals from the distal circumflex with no significant CAD requiring revascularization at that time.    CATARACT EXTRACTION, BILATERAL Bilateral     COLONOSCOPY      CORONARY ANGIOPLASTY WITH STENT PLACEMENT      RCA    CORONARY ANGIOPLASTY WITH STENT PLACEMENT      RCA    CORONARY ANGIOPLASTY WITH STENT PLACEMENT      LAD    EYE SURGERY      AL BX PROSTATE STRTCTC SATURATION SAMPLING IMG GID N/A 2022    Procedure: TRANSPERINEAL MRI FUSION  BIOPSY PROSTATE;  Surgeon: Mele Patel MD;  Location: [facility];  Service: Urology    AL NEUROPLASTY &/TRANSPOS MEDIAN NRV CARPAL TUNNE Left 2022    Procedure: RELEASE CARPAL TUNNEL;  Surgeon: Matty Mcgowan MD;  Location: [facility];  Service: Orthopedics    AL NEUROPLASTY &/TRANSPOSITION ULNAR NERVE ELBOW Left 2022    Procedure: RELEASE CUBITAL TUNNEL;  Surgeon: Matty Mcgowan MD;  Location: [facility];  Service: Orthopedics    AL NEUROPLASTY &/TRANSPOSITION ULNAR NERVE WRIST Left 2022    Procedure: GUYON'S CANAL RELEASE;  Surgeon: Matty Mcgowan MD;  Location: [facility];  Service: Orthopedics    SKIN CANCER EXCISION      chin-per pt, basal cell  also right ankle    TONSILLECTOMY  no       Family History   Problem Relation Age of Onset    Heart attack Mother     Dementia Mother     Heart failure Mother          2006    Heart disease Mother         heart attacks (2)    No Known Problems Father      I have reviewed and agree with the history as documented.    E-Cigarette/Vaping    E-Cigarette Use Never User      E-Cigarette/Vaping Substances    Nicotine No     THC No     CBD No     Flavoring No     Other No     Unknown No      Social  History     Tobacco Use    Smoking status: Former     Current packs/day: 0.00     Average packs/day: 2.0 packs/day for 35.5 years (71.0 ttl pk-yrs)     Types: Cigarettes     Start date: 1960     Quit date: 1995     Years since quittin.4    Smokeless tobacco: Never    Tobacco comments:     stopped smoking the day i had a heart attack   Vaping Use    Vaping status: Never Used   Substance Use Topics    Alcohol use: Not Currently    Drug use: Never       Review of Systems   Constitutional:  Negative for fever.   HENT:  Negative for sore throat.    Eyes:  Negative for photophobia.   Respiratory:  Negative for shortness of breath.    Cardiovascular:  Negative for chest pain.   Gastrointestinal:  Negative for abdominal pain.   Genitourinary:  Negative for dysuria.   Musculoskeletal:  Negative for back pain.   Skin:  Negative for rash.   Neurological:  Negative for light-headedness.   Hematological:  Negative for adenopathy.   Psychiatric/Behavioral:  Negative for agitation.    All other systems reviewed and are negative.      Physical Exam  Physical Exam  Vitals reviewed.   Constitutional:       General: He is not in acute distress.     Appearance: He is well-developed.   HENT:      Head: Normocephalic.   Eyes:      Pupils: Pupils are equal, round, and reactive to light.   Cardiovascular:      Rate and Rhythm: Normal rate and regular rhythm.      Heart sounds: Normal heart sounds. No murmur heard.     No friction rub. No gallop.   Pulmonary:      Effort: Pulmonary effort is normal.      Breath sounds: Normal breath sounds.   Abdominal:      General: Bowel sounds are normal. There is no distension.      Palpations: Abdomen is soft.      Tenderness: There is no abdominal tenderness. There is no guarding.   Musculoskeletal:         General: Normal range of motion.      Cervical back: Normal range of motion and neck supple.   Skin:     Capillary Refill: Capillary refill takes less than 2 seconds.   Neurological:       Mental Status: He is alert and oriented to person, place, and time.      Cranial Nerves: No cranial nerve deficit.      Sensory: No sensory deficit.      Motor: No abnormal muscle tone.   Psychiatric:         Behavior: Behavior normal.         Thought Content: Thought content normal.         Judgment: Judgment normal.         Vital Signs  ED Triage Vitals [12/29/23 1857]   Temperature Pulse Respirations Blood Pressure SpO2   98.8 °F (37.1 °C) 104 16 105/66 94 %      Temp Source Heart Rate Source Patient Position - Orthostatic VS BP Location FiO2 (%)   Temporal Monitor Lying Right arm --      Pain Score       3           Vitals:    12/29/23 1857 12/29/23 2030   BP: 105/66 100/64   Pulse: 104 100   Patient Position - Orthostatic VS: Lying          Visual Acuity      ED Medications  Medications   potassium chloride (K-DUR,KLOR-CON) CR tablet 40 mEq (40 mEq Oral Given 12/29/23 1941)   sodium chloride 0.9 % bolus 500 mL (0 mL Intravenous Stopped 12/29/23 2043)   fluticasone (FLONASE) 50 mcg/act nasal spray 1 spray (1 spray Each Nare Given 12/29/23 1941)   magnesium sulfate 2 g/50 mL IVPB (premix) 2 g (0 g Intravenous Stopped 12/29/23 2043)       Diagnostic Studies  Results Reviewed       Procedure Component Value Units Date/Time    Basic metabolic panel [[id number]]  (Abnormal) Collected: 12/29/23 1911    Lab Status: Final result Specimen: Blood from Hand, Right Updated: 12/29/23 1935     Sodium 141 mmol/L      Potassium 3.1 mmol/L      Chloride 98 mmol/L      CO2 33 mmol/L      ANION GAP 10 mmol/L      BUN 20 mg/dL      Creatinine 1.22 mg/dL      Glucose 124 mg/dL      Calcium 9.2 mg/dL      eGFR 56 ml/min/1.73sq m     Narrative:      National Kidney Disease Foundation guidelines for Chronic Kidney Disease (CKD):     Stage 1 with normal or high GFR (GFR > 90 mL/min/1.73 square meters)    Stage 2 Mild CKD (GFR = 60-89 mL/min/1.73 square meters)    Stage 3A Moderate CKD (GFR = 45-59 mL/min/1.73 square meters)    Stage  3B Moderate CKD (GFR = 30-44 mL/min/1.73 square meters)    Stage 4 Severe CKD (GFR = 15-29 mL/min/1.73 square meters)    Stage 5 End Stage CKD (GFR <15 mL/min/1.73 square meters)  Note: GFR calculation is accurate only with a steady state creatinine    Magnesium [[id number]]  (Abnormal) Collected: 12/29/23 1911    Lab Status: Final result Specimen: Blood from Hand, Right Updated: 12/29/23 1935     Magnesium 1.8 mg/dL     CBC and differential [[id number]]  (Abnormal) Collected: 12/29/23 1911    Lab Status: Final result Specimen: Blood from Hand, Right Updated: 12/29/23 1915     WBC 7.11 Thousand/uL      RBC 4.11 Million/uL      Hemoglobin 13.2 g/dL      Hematocrit 41.7 %       fL      MCH 32.1 pg      MCHC 31.7 g/dL      RDW 16.7 %      MPV 10.6 fL      Platelets 231 Thousands/uL      nRBC 0 /100 WBCs      Neutrophils Relative 87 %      Immat GRANS % 0 %      Lymphocytes Relative 4 %      Monocytes Relative 9 %      Eosinophils Relative 0 %      Basophils Relative 0 %      Neutrophils Absolute 6.17 Thousands/µL      Immature Grans Absolute 0.02 Thousand/uL      Lymphocytes Absolute 0.30 Thousands/µL      Monocytes Absolute 0.62 Thousand/µL      Eosinophils Absolute 0.00 Thousand/µL      Basophils Absolute 0.00 Thousands/µL                    No orders to display              Procedures  ECG 12 Lead Documentation Only    Date/Time: 12/29/2023 10:23 PM    Performed by: Abdiel Lara MD  Authorized by: Abdiel Lara MD    ECG reviewed by me, the ED Provider: yes    Patient location:  ED  Previous ECG:     Previous ECG:  Compared to current    Comparison to cardiac monitor: Yes    Interpretation:     Interpretation: abnormal    Rate:     ECG rate assessment: normal    Rhythm:     Rhythm: sinus rhythm    Ectopy:     Ectopy: PVCs      PVCs:  Frequent  QRS:     QRS axis:  Normal    QRS intervals:  Normal  Conduction:     Conduction: normal    ST segments:     ST segments:  Normal  T waves:     T waves: normal              ED Course                               SBIRT 20yo+      Flowsheet Row Most Recent Value   Initial Alcohol Screen: US AUDIT-C     1. How often do you have a drink containing alcohol? 0 Filed at: 12/29/2023 1910   2. How many drinks containing alcohol do you have on a typical day you are drinking?  0 Filed at: 12/29/2023 1910   3a. Male UNDER 65: How often do you have five or more drinks on one occasion? 0 Filed at: 12/29/2023 1910   3b. FEMALE Any Age, or MALE 65+: How often do you have 4 or more drinks on one occassion? 0 Filed at: 12/29/2023 1910   Audit-C Score 0 Filed at: 12/29/2023 1910   CRISTOPHER: How many times in the past year have you...    Used an illegal drug or used a prescription medication for non-medical reasons? Never Filed at: 12/29/2023 1910                      Medical Decision Making  Patient is a 78-year-old male who presents for evaluation of asymptomatic hypokalemia.  Blood work reviewed, potassium 3.1 repleted orally here.  Patient otherwise asymptomatic, but magnesium also repleted.  Advised follow-up with outpatient physician.    Amount and/or Complexity of Data Reviewed  Labs: ordered.    Risk  Prescription drug management.             Disposition  Final diagnoses:   Hypokalemia   Hypomagnesemia     Time reflects when diagnosis was documented in both MDM as applicable and the Disposition within this note       Time User Action Codes Description Comment    12/29/2023  8:33 PM Abdiel Lara Add [E87.6] Hypokalemia     12/29/2023  8:34 PM Abdiel Lara Add [E83.42] Hypomagnesemia           ED Disposition       ED Disposition   Discharge    Condition   Stable    Date/Time   Fri Dec 29, 2023 2033    Comment   Seun Alva discharge to home/self care.                   Follow-up Information       Follow up With Specialties Details Why Contact Info Additional Information    [facility] Emergency Medicine  If symptoms worsen [address] [place] [address] [phone] [facility], [address], [place], [place] [address]    Sarahy Clifford DO Family Medicine Schedule an appointment as soon as possible for a visit   [address]  [address]  [place] [address]  [phone]               Discharge Medication List as of 12/29/2023  8:34 PM        CONTINUE these medications which have NOT CHANGED    Details   ALPRAZolam (XANAX) 0.25 mg tablet Take 1 tablet (0.25 mg total) by mouth daily at bedtime as needed for anxiety, Starting Wed 11/22/2023, Normal      aspirin 81 mg chewable tablet 81 mg daily at bedtime, Historical Med      azithromycin (ZITHROMAX) 250 mg tablet Take 2 tablets today then 1 tablet daily x 4 days, Normal      bacitracin ophthalmic ointment Historical Med      budesonide (PULMICORT) 0.5 mg/2 mL nebulizer solution Take 2 mL (0.5 mg total) by nebulization 2 (two) times a day Rinse mouth after use., Starting Fri 12/22/2023, Normal      cholecalciferol (VITAMIN D3) 1,000 units tablet Take 1,000 Units by mouth daily, Historical Med      dextromethorphan-guaiFENesin (ROBITUSSIN DM)  mg/5 mL syrup Take 5 mL by mouth 3 (three) times a day as needed for cough, Starting Mon 2/13/2023, Normal      doxycycline monohydrate (MONODOX) 100 mg capsule Take 1 capsule (100 mg total) by mouth 2 (two) times a day for 7 days, Starting Tue 12/26/2023, Until Tue 1/2/2024, Normal      Empagliflozin (Jardiance) 10 MG TABS tablet Take 1 tablet (10 mg total) by mouth every morning, Starting Wed 9/6/2023, Until Thu 2/27/2025, Normal      famotidine (PEPCID) 20 mg tablet TAKE 1 TABLET BY MOUTH DAILY AT  BEDTIME, Normal      fluticasone (FLONASE) 50 mcg/act nasal spray 1 spray into each nostril 2 (two) times a day, Starting Fri 12/8/2023, Normal      formoterol (PERFOROMIST) 20 MCG/2ML nebulizer solution Take 2 mL (20 mcg total) by nebulization 2 (two) times a day, Starting Tue  11/28/2023, Normal      furosemide (LASIX) 40 mg tablet Take 0.5 tablets (20 mg total) by mouth daily, Starting Wed 9/6/2023, Normal      gabapentin (NEURONTIN) 300 mg capsule Take 1 capsule (300 mg total) by mouth daily at bedtime, Starting Tue 11/28/2023, Normal      guaiFENesin (MUCINEX) 600 mg 12 hr tablet Take 1 tablet (600 mg total) by mouth 2 (two) times a day, Starting Fri 12/8/2023, Until Sun 1/7/2024, Normal      Humidifier MISC Use continuous, Starting Tue 11/28/2023, Normal      HYDROcodone-acetaminophen (Norco) 5-325 mg per tablet Take 1 tablet by mouth every 6 (six) hours as needed for pain Max Daily Amount: 4 tablets, Starting Fri 6/30/2023, Normal      ipratropium-albuterol (DUO-NEB) 0.5-2.5 mg/3 mL nebulizer solution Starting Mon 8/14/2023, Historical Med      methocarbamol (ROBAXIN) 500 mg tablet Take 1 tablet (500 mg total) by mouth 3 (three) times a day as needed for muscle spasms, Starting Sun 7/23/2023, Normal      metoprolol succinate (TOPROL-XL) 25 mg 24 hr tablet Take 0.5 tablets (12.5 mg total) by mouth 2 (two) times a day, Starting Fri 10/27/2023, Normal      naloxone (NARCAN) 4 mg/0.1 mL nasal spray Administer 1 spray into a nostril. If no response after 2-3 minutes, give another dose in the other nostril using a new spray., Normal      nystatin (MYCOSTATIN) 500,000 units/5 mL suspension 5 ml orally (swish and spit/swallow) every four hours while awake, Normal      omeprazole (PriLOSEC) 20 mg delayed release capsule take 1 capsule by mouth once daily, Starting Mon 11/27/2023, Normal      oxygen gas Inhale 2 L/min continuous 2LPM at rest and 3-4 LPM with activity per D Cedric FANG notes, Historical Med      potassium chloride (K-DUR,KLOR-CON) 20 mEq tablet Take 20 mEq by mouth every other day, Historical Med      predniSONE 10 mg tablet 4 tabs x 5 days, Normal      rosuvastatin (CRESTOR) 40 MG tablet TAKE 1 TABLET DAILY, Normal      sodium chloride (OCEAN) 0.65 % nasal spray 1 spray into  each nostril every hour as needed for congestion, Starting Fri 12/8/2023, Normal             No discharge procedures on file.    PDMP Review         Value Time User    PDMP Reviewed  Yes 11/22/2023  8:01 AM Sarahy Clifford DO            ED Provider  Electronically Signed by             Abdiel Lara MD  12/29/23 1846

## 2023-12-31 LAB
ATRIAL RATE: 100 BPM
P AXIS: 70 DEGREES
PR INTERVAL: 160 MS
QRS AXIS: 77 DEGREES
QRSD INTERVAL: 98 MS
QT INTERVAL: 376 MS
QTC INTERVAL: 499 MS
T WAVE AXIS: 269 DEGREES
VENTRICULAR RATE: 106 BPM

## 2024-01-01 ENCOUNTER — APPOINTMENT (OUTPATIENT)
Dept: INTERVENTIONAL RADIOLOGY/VASCULAR | Facility: HOSPITAL | Age: 79
DRG: 291 | End: 2024-01-01
Payer: COMMERCIAL

## 2024-01-01 ENCOUNTER — HOSPITAL ENCOUNTER (INPATIENT)
Facility: HOSPITAL | Age: 79
LOS: 1 days | DRG: 291 | End: 2024-03-24
Attending: EMERGENCY MEDICINE | Admitting: INTERNAL MEDICINE
Payer: COMMERCIAL

## 2024-01-01 ENCOUNTER — APPOINTMENT (EMERGENCY)
Dept: RADIOLOGY | Facility: HOSPITAL | Age: 79
DRG: 291 | End: 2024-01-01
Payer: COMMERCIAL

## 2024-01-01 ENCOUNTER — PATIENT OUTREACH (OUTPATIENT)
Dept: CASE MANAGEMENT | Facility: OTHER | Age: 79
End: 2024-01-01

## 2024-01-01 ENCOUNTER — HOME CARE VISIT (OUTPATIENT)
Dept: HOME HEALTH SERVICES | Facility: HOME HEALTHCARE | Age: 79
End: 2024-01-01
Payer: COMMERCIAL

## 2024-01-01 ENCOUNTER — OFFICE VISIT (OUTPATIENT)
Dept: FAMILY MEDICINE CLINIC | Facility: CLINIC | Age: 79
End: 2024-01-01
Payer: COMMERCIAL

## 2024-01-01 ENCOUNTER — APPOINTMENT (OUTPATIENT)
Dept: LAB | Facility: CLINIC | Age: 79
End: 2024-01-01
Payer: COMMERCIAL

## 2024-01-01 ENCOUNTER — TRANSITIONAL CARE MANAGEMENT (OUTPATIENT)
Dept: FAMILY MEDICINE CLINIC | Facility: CLINIC | Age: 79
End: 2024-01-01

## 2024-01-01 ENCOUNTER — APPOINTMENT (OUTPATIENT)
Dept: LAB | Facility: HOSPITAL | Age: 79
DRG: 291 | End: 2024-01-01
Payer: COMMERCIAL

## 2024-01-01 ENCOUNTER — APPOINTMENT (EMERGENCY)
Dept: CT IMAGING | Facility: HOSPITAL | Age: 79
DRG: 291 | End: 2024-01-01
Payer: COMMERCIAL

## 2024-01-01 ENCOUNTER — HOME HEALTH ADMISSION (OUTPATIENT)
Dept: HOME HEALTH SERVICES | Facility: HOME HEALTHCARE | Age: 79
End: 2024-01-01
Payer: COMMERCIAL

## 2024-01-01 ENCOUNTER — HOME CARE VISIT (OUTPATIENT)
Dept: HOME HEALTH SERVICES | Facility: HOME HEALTHCARE | Age: 79
End: 2024-01-01

## 2024-01-01 ENCOUNTER — HOSPITAL ENCOUNTER (INPATIENT)
Facility: HOSPITAL | Age: 79
LOS: 4 days | Discharge: HOME HEALTH | DRG: 291 | End: 2024-03-18
Attending: FAMILY MEDICINE | Admitting: INTERNAL MEDICINE
Payer: COMMERCIAL

## 2024-01-01 ENCOUNTER — APPOINTMENT (EMERGENCY)
Dept: ULTRASOUND IMAGING | Facility: HOSPITAL | Age: 79
DRG: 291 | End: 2024-01-01
Payer: COMMERCIAL

## 2024-01-01 ENCOUNTER — APPOINTMENT (OUTPATIENT)
Dept: RADIOLOGY | Facility: HOSPITAL | Age: 79
DRG: 291 | End: 2024-01-01
Payer: COMMERCIAL

## 2024-01-01 VITALS
DIASTOLIC BLOOD PRESSURE: 52 MMHG | BODY MASS INDEX: 23.34 KG/M2 | HEIGHT: 68 IN | OXYGEN SATURATION: 90 % | HEART RATE: 99 BPM | RESPIRATION RATE: 18 BRPM | WEIGHT: 154 LBS | SYSTOLIC BLOOD PRESSURE: 120 MMHG | TEMPERATURE: 97.9 F

## 2024-01-01 VITALS
RESPIRATION RATE: 18 BRPM | SYSTOLIC BLOOD PRESSURE: 111 MMHG | TEMPERATURE: 97.4 F | DIASTOLIC BLOOD PRESSURE: 74 MMHG | HEIGHT: 68 IN | WEIGHT: 154.32 LBS | HEART RATE: 101 BPM | OXYGEN SATURATION: 98 % | BODY MASS INDEX: 23.39 KG/M2

## 2024-01-01 VITALS — SYSTOLIC BLOOD PRESSURE: 119 MMHG | DIASTOLIC BLOOD PRESSURE: 64 MMHG | TEMPERATURE: 97.3 F | OXYGEN SATURATION: 92 %

## 2024-01-01 VITALS — DIASTOLIC BLOOD PRESSURE: 60 MMHG | HEART RATE: 100 BPM | SYSTOLIC BLOOD PRESSURE: 102 MMHG | OXYGEN SATURATION: 88 %

## 2024-01-01 DIAGNOSIS — I50.43 ACUTE ON CHRONIC COMBINED SYSTOLIC (CONGESTIVE) AND DIASTOLIC (CONGESTIVE) HEART FAILURE (HCC): Primary | ICD-10-CM

## 2024-01-01 DIAGNOSIS — E83.42 HYPOMAGNESEMIA: Primary | ICD-10-CM

## 2024-01-01 DIAGNOSIS — R30.0 DYSURIA: ICD-10-CM

## 2024-01-01 DIAGNOSIS — G47.00 INSOMNIA, UNSPECIFIED TYPE: ICD-10-CM

## 2024-01-01 DIAGNOSIS — I50.20 CONGESTIVE HEART FAILURE WITH LEFT VENTRICULAR SYSTOLIC DYSFUNCTION (LVSD) (HCC): ICD-10-CM

## 2024-01-01 DIAGNOSIS — E43 SEVERE PROTEIN-CALORIE MALNUTRITION (HCC): ICD-10-CM

## 2024-01-01 DIAGNOSIS — R10.9 ABDOMINAL PAIN: ICD-10-CM

## 2024-01-01 DIAGNOSIS — N18.32 STAGE 3B CHRONIC KIDNEY DISEASE (HCC): ICD-10-CM

## 2024-01-01 DIAGNOSIS — J44.1 COPD EXACERBATION (HCC): ICD-10-CM

## 2024-01-01 DIAGNOSIS — N17.9 AKI (ACUTE KIDNEY INJURY) (HCC): Primary | ICD-10-CM

## 2024-01-01 DIAGNOSIS — I13.0 HYPERTENSIVE HEART AND CHRONIC KIDNEY DISEASE WITH HEART FAILURE AND STAGE 1 THROUGH STAGE 4 CHRONIC KIDNEY DISEASE, OR UNSPECIFIED CHRONIC KIDNEY DISEASE (HCC): Chronic | ICD-10-CM

## 2024-01-01 DIAGNOSIS — J90 PLEURAL EFFUSION: ICD-10-CM

## 2024-01-01 DIAGNOSIS — R79.89 ELEVATED LFTS: ICD-10-CM

## 2024-01-01 DIAGNOSIS — N17.9 AKI (ACUTE KIDNEY INJURY) (HCC): ICD-10-CM

## 2024-01-01 DIAGNOSIS — I50.22 CHRONIC SYSTOLIC CONGESTIVE HEART FAILURE (HCC): Chronic | ICD-10-CM

## 2024-01-01 DIAGNOSIS — I10 ESSENTIAL HYPERTENSION: ICD-10-CM

## 2024-01-01 DIAGNOSIS — I50.9 CHF (CONGESTIVE HEART FAILURE) (HCC): ICD-10-CM

## 2024-01-01 DIAGNOSIS — N19 RENAL FAILURE: ICD-10-CM

## 2024-01-01 LAB
2HR DELTA HS TROPONIN: -7 NG/L
ALBUMIN SERPL BCP-MCNC: 3.4 G/DL (ref 3.5–5)
ALBUMIN SERPL BCP-MCNC: 3.4 G/DL (ref 3.5–5)
ALBUMIN SERPL BCP-MCNC: 3.8 G/DL (ref 3.5–5)
ALBUMIN SERPL BCP-MCNC: 3.9 G/DL (ref 3.5–5)
ALP SERPL-CCNC: 115 U/L (ref 34–104)
ALP SERPL-CCNC: 134 U/L (ref 34–104)
ALP SERPL-CCNC: 136 U/L (ref 34–104)
ALP SERPL-CCNC: 82 U/L (ref 34–104)
ALT SERPL W P-5'-P-CCNC: 31 U/L (ref 7–52)
ALT SERPL W P-5'-P-CCNC: 329 U/L (ref 7–52)
ALT SERPL W P-5'-P-CCNC: 65 U/L (ref 7–52)
ALT SERPL W P-5'-P-CCNC: 82 U/L (ref 7–52)
AMORPH URATE CRY URNS QL MICRO: ABNORMAL /HPF
AMORPH URATE CRY URNS QL MICRO: ABNORMAL /HPF
ANION GAP SERPL CALCULATED.3IONS-SCNC: 10 MMOL/L (ref 4–13)
ANION GAP SERPL CALCULATED.3IONS-SCNC: 12 MMOL/L (ref 4–13)
ANION GAP SERPL CALCULATED.3IONS-SCNC: 12 MMOL/L (ref 4–13)
ANION GAP SERPL CALCULATED.3IONS-SCNC: 8 MMOL/L (ref 4–13)
APPEARANCE FLD: CLEAR
AST SERPL W P-5'-P-CCNC: 23 U/L (ref 13–39)
AST SERPL W P-5'-P-CCNC: 34 U/L (ref 13–39)
AST SERPL W P-5'-P-CCNC: 430 U/L (ref 13–39)
AST SERPL W P-5'-P-CCNC: 44 U/L (ref 13–39)
BACTERIA SPEC BFLD CULT: NO GROWTH
BACTERIA UR CULT: NORMAL
BACTERIA UR QL AUTO: ABNORMAL /HPF
BASOPHILS # BLD AUTO: 0.02 THOUSANDS/ÂΜL (ref 0–0.1)
BASOPHILS # BLD AUTO: 0.02 THOUSANDS/ÂΜL (ref 0–0.1)
BASOPHILS # BLD AUTO: 0.04 THOUSANDS/ÂΜL (ref 0–0.1)
BASOPHILS # BLD AUTO: 0.05 THOUSANDS/ÂΜL (ref 0–0.1)
BASOPHILS NFR BLD AUTO: 0 % (ref 0–1)
BASOPHILS NFR BLD AUTO: 1 % (ref 0–1)
BILIRUB SERPL-MCNC: 0.64 MG/DL (ref 0.2–1)
BILIRUB SERPL-MCNC: 0.81 MG/DL (ref 0.2–1)
BILIRUB SERPL-MCNC: 1.09 MG/DL (ref 0.2–1)
BILIRUB SERPL-MCNC: 1.57 MG/DL (ref 0.2–1)
BILIRUB UR QL STRIP: NEGATIVE
BNP SERPL-MCNC: >4700 PG/ML (ref 0–100)
BNP SERPL-MCNC: >4700 PG/ML (ref 0–100)
BUN SERPL-MCNC: 24 MG/DL (ref 5–25)
BUN SERPL-MCNC: 26 MG/DL (ref 5–25)
BUN SERPL-MCNC: 29 MG/DL (ref 5–25)
BUN SERPL-MCNC: 32 MG/DL (ref 5–25)
BUN SERPL-MCNC: 35 MG/DL (ref 5–25)
BUN SERPL-MCNC: 36 MG/DL (ref 5–25)
BUN SERPL-MCNC: 36 MG/DL (ref 5–25)
BUN SERPL-MCNC: 37 MG/DL (ref 5–25)
CALCIUM ALBUM COR SERPL-MCNC: 9 MG/DL (ref 8.3–10.1)
CALCIUM ALBUM COR SERPL-MCNC: 9.2 MG/DL (ref 8.3–10.1)
CALCIUM SERPL-MCNC: 8.2 MG/DL (ref 8.4–10.2)
CALCIUM SERPL-MCNC: 8.3 MG/DL (ref 8.4–10.2)
CALCIUM SERPL-MCNC: 8.4 MG/DL (ref 8.4–10.2)
CALCIUM SERPL-MCNC: 8.4 MG/DL (ref 8.4–10.2)
CALCIUM SERPL-MCNC: 8.5 MG/DL (ref 8.4–10.2)
CALCIUM SERPL-MCNC: 8.7 MG/DL (ref 8.4–10.2)
CALCIUM SERPL-MCNC: 9.2 MG/DL (ref 8.4–10.2)
CALCIUM SERPL-MCNC: 9.2 MG/DL (ref 8.4–10.2)
CARDIAC TROPONIN I PNL SERPL HS: 37 NG/L
CARDIAC TROPONIN I PNL SERPL HS: 44 NG/L
CHLORIDE SERPL-SCNC: 100 MMOL/L (ref 96–108)
CHLORIDE SERPL-SCNC: 101 MMOL/L (ref 96–108)
CHLORIDE SERPL-SCNC: 101 MMOL/L (ref 96–108)
CHLORIDE SERPL-SCNC: 95 MMOL/L (ref 96–108)
CHLORIDE SERPL-SCNC: 98 MMOL/L (ref 96–108)
CHLORIDE SERPL-SCNC: 99 MMOL/L (ref 96–108)
CLARITY UR: ABNORMAL
CLARITY UR: ABNORMAL
CLARITY UR: CLEAR
CO2 SERPL-SCNC: 25 MMOL/L (ref 21–32)
CO2 SERPL-SCNC: 26 MMOL/L (ref 21–32)
CO2 SERPL-SCNC: 28 MMOL/L (ref 21–32)
CO2 SERPL-SCNC: 28 MMOL/L (ref 21–32)
CO2 SERPL-SCNC: 30 MMOL/L (ref 21–32)
CO2 SERPL-SCNC: 31 MMOL/L (ref 21–32)
CO2 SERPL-SCNC: 34 MMOL/L (ref 21–32)
CO2 SERPL-SCNC: 35 MMOL/L (ref 21–32)
COARSE GRAN CASTS URNS QL MICRO: ABNORMAL /LPF
COARSE GRAN CASTS URNS QL MICRO: ABNORMAL /LPF
COLOR FLD: NORMAL
COLOR UR: YELLOW
CREAT SERPL-MCNC: 1.55 MG/DL (ref 0.6–1.3)
CREAT SERPL-MCNC: 1.85 MG/DL (ref 0.6–1.3)
CREAT SERPL-MCNC: 1.9 MG/DL (ref 0.6–1.3)
CREAT SERPL-MCNC: 2.05 MG/DL (ref 0.6–1.3)
CREAT SERPL-MCNC: 2.28 MG/DL (ref 0.6–1.3)
CREAT SERPL-MCNC: 2.3 MG/DL (ref 0.6–1.3)
CREAT SERPL-MCNC: 2.3 MG/DL (ref 0.6–1.3)
CREAT SERPL-MCNC: 2.43 MG/DL (ref 0.6–1.3)
CREAT UR-MCNC: 32.1 MG/DL
CREAT UR-MCNC: 32.1 MG/DL
EOSINOPHIL # BLD AUTO: 0.01 THOUSAND/ÂΜL (ref 0–0.61)
EOSINOPHIL # BLD AUTO: 0.02 THOUSAND/ÂΜL (ref 0–0.61)
EOSINOPHIL # BLD AUTO: 0.02 THOUSAND/ÂΜL (ref 0–0.61)
EOSINOPHIL # BLD AUTO: 0.08 THOUSAND/ÂΜL (ref 0–0.61)
EOSINOPHIL NFR BLD AUTO: 0 % (ref 0–6)
EOSINOPHIL NFR BLD AUTO: 1 % (ref 0–6)
ERYTHROCYTE [DISTWIDTH] IN BLOOD BY AUTOMATED COUNT: 15.7 % (ref 11.6–15.1)
ERYTHROCYTE [DISTWIDTH] IN BLOOD BY AUTOMATED COUNT: 15.8 % (ref 11.6–15.1)
ERYTHROCYTE [DISTWIDTH] IN BLOOD BY AUTOMATED COUNT: 15.8 % (ref 11.6–15.1)
ERYTHROCYTE [DISTWIDTH] IN BLOOD BY AUTOMATED COUNT: 15.9 % (ref 11.6–15.1)
ERYTHROCYTE [DISTWIDTH] IN BLOOD BY AUTOMATED COUNT: 16 % (ref 11.6–15.1)
ERYTHROCYTE [DISTWIDTH] IN BLOOD BY AUTOMATED COUNT: 16.2 % (ref 11.6–15.1)
GFR SERPL CREATININE-BSD FRML MDRD: 24 ML/MIN/1.73SQ M
GFR SERPL CREATININE-BSD FRML MDRD: 26 ML/MIN/1.73SQ M
GFR SERPL CREATININE-BSD FRML MDRD: 29 ML/MIN/1.73SQ M
GFR SERPL CREATININE-BSD FRML MDRD: 32 ML/MIN/1.73SQ M
GFR SERPL CREATININE-BSD FRML MDRD: 33 ML/MIN/1.73SQ M
GFR SERPL CREATININE-BSD FRML MDRD: 41 ML/MIN/1.73SQ M
GLUCOSE FLD-MCNC: 151 MG/DL
GLUCOSE P FAST SERPL-MCNC: 123 MG/DL (ref 65–99)
GLUCOSE SERPL-MCNC: 113 MG/DL (ref 65–140)
GLUCOSE SERPL-MCNC: 118 MG/DL (ref 65–140)
GLUCOSE SERPL-MCNC: 119 MG/DL (ref 65–140)
GLUCOSE SERPL-MCNC: 127 MG/DL (ref 65–140)
GLUCOSE SERPL-MCNC: 173 MG/DL (ref 65–140)
GLUCOSE SERPL-MCNC: 91 MG/DL (ref 65–140)
GLUCOSE SERPL-MCNC: 94 MG/DL (ref 65–140)
GLUCOSE SERPL-MCNC: 96 MG/DL (ref 65–140)
GLUCOSE UR STRIP-MCNC: ABNORMAL MG/DL
GRAM STN SPEC: NORMAL
GRAM STN SPEC: NORMAL
HCT VFR BLD AUTO: 41.7 % (ref 36.5–49.3)
HCT VFR BLD AUTO: 42 % (ref 36.5–49.3)
HCT VFR BLD AUTO: 42 % (ref 36.5–49.3)
HCT VFR BLD AUTO: 42.6 % (ref 36.5–49.3)
HCT VFR BLD AUTO: 43.2 % (ref 36.5–49.3)
HCT VFR BLD AUTO: 44.1 % (ref 36.5–49.3)
HCT VFR BLD AUTO: 46.9 % (ref 36.5–49.3)
HCT VFR BLD AUTO: 47.9 % (ref 36.5–49.3)
HGB BLD-MCNC: 12.7 G/DL (ref 12–17)
HGB BLD-MCNC: 13 G/DL (ref 12–17)
HGB BLD-MCNC: 13.1 G/DL (ref 12–17)
HGB BLD-MCNC: 13.2 G/DL (ref 12–17)
HGB BLD-MCNC: 13.2 G/DL (ref 12–17)
HGB BLD-MCNC: 13.5 G/DL (ref 12–17)
HGB BLD-MCNC: 14.2 G/DL (ref 12–17)
HGB BLD-MCNC: 14.7 G/DL (ref 12–17)
HGB UR QL STRIP.AUTO: ABNORMAL
HGB UR QL STRIP.AUTO: ABNORMAL
HGB UR QL STRIP.AUTO: NEGATIVE
HISTIOCYTES NFR FLD: 89 %
HYALINE CASTS #/AREA URNS LPF: ABNORMAL /LPF
HYALINE CASTS #/AREA URNS LPF: ABNORMAL /LPF
IMM GRANULOCYTES # BLD AUTO: 0.04 THOUSAND/UL (ref 0–0.2)
IMM GRANULOCYTES # BLD AUTO: 0.05 THOUSAND/UL (ref 0–0.2)
IMM GRANULOCYTES NFR BLD AUTO: 0 % (ref 0–2)
IMM GRANULOCYTES NFR BLD AUTO: 1 % (ref 0–2)
KETONES UR STRIP-MCNC: NEGATIVE MG/DL
LDH FLD L TO P-CCNC: 74 U/L
LDH SERPL-CCNC: 271 U/L (ref 140–271)
LEUKOCYTE ESTERASE UR QL STRIP: NEGATIVE
LIPASE SERPL-CCNC: 65 U/L (ref 11–82)
LYMPHOCYTES # BLD AUTO: 0.48 THOUSANDS/ÂΜL (ref 0.6–4.47)
LYMPHOCYTES # BLD AUTO: 0.51 THOUSANDS/ÂΜL (ref 0.6–4.47)
LYMPHOCYTES # BLD AUTO: 1 THOUSANDS/ÂΜL (ref 0.6–4.47)
LYMPHOCYTES # BLD AUTO: 1.02 THOUSANDS/ÂΜL (ref 0.6–4.47)
LYMPHOCYTES NFR BLD AUTO: 1 %
LYMPHOCYTES NFR BLD AUTO: 11 % (ref 14–44)
LYMPHOCYTES NFR BLD AUTO: 5 % (ref 14–44)
LYMPHOCYTES NFR BLD AUTO: 6 % (ref 14–44)
LYMPHOCYTES NFR BLD AUTO: 9 % (ref 14–44)
MAGNESIUM SERPL-MCNC: 1.8 MG/DL (ref 1.9–2.7)
MAGNESIUM SERPL-MCNC: 1.9 MG/DL (ref 1.9–2.7)
MAGNESIUM SERPL-MCNC: 2.3 MG/DL (ref 1.9–2.7)
MCH RBC QN AUTO: 30.8 PG (ref 26.8–34.3)
MCH RBC QN AUTO: 31 PG (ref 26.8–34.3)
MCH RBC QN AUTO: 31.1 PG (ref 26.8–34.3)
MCH RBC QN AUTO: 31.2 PG (ref 26.8–34.3)
MCH RBC QN AUTO: 31.2 PG (ref 26.8–34.3)
MCH RBC QN AUTO: 31.4 PG (ref 26.8–34.3)
MCHC RBC AUTO-ENTMCNC: 30.3 G/DL (ref 31.4–37.4)
MCHC RBC AUTO-ENTMCNC: 30.5 G/DL (ref 31.4–37.4)
MCHC RBC AUTO-ENTMCNC: 30.6 G/DL (ref 31.4–37.4)
MCHC RBC AUTO-ENTMCNC: 30.6 G/DL (ref 31.4–37.4)
MCHC RBC AUTO-ENTMCNC: 30.7 G/DL (ref 31.4–37.4)
MCHC RBC AUTO-ENTMCNC: 31 G/DL (ref 31.4–37.4)
MCHC RBC AUTO-ENTMCNC: 31 G/DL (ref 31.4–37.4)
MCHC RBC AUTO-ENTMCNC: 31.2 G/DL (ref 31.4–37.4)
MCV RBC AUTO: 101 FL (ref 82–98)
MCV RBC AUTO: 102 FL (ref 82–98)
MCV RBC AUTO: 102 FL (ref 82–98)
MCV RBC AUTO: 103 FL (ref 82–98)
MONO+MESO NFR FLD MANUAL: 1 %
MONOCYTES # BLD AUTO: 0.67 THOUSAND/ÂΜL (ref 0.17–1.22)
MONOCYTES # BLD AUTO: 0.78 THOUSAND/ÂΜL (ref 0.17–1.22)
MONOCYTES # BLD AUTO: 0.86 THOUSAND/ÂΜL (ref 0.17–1.22)
MONOCYTES # BLD AUTO: 1.29 THOUSAND/ÂΜL (ref 0.17–1.22)
MONOCYTES NFR BLD AUTO: 12 % (ref 4–12)
MONOCYTES NFR BLD AUTO: 7 % (ref 4–12)
MONOCYTES NFR BLD AUTO: 9 % (ref 4–12)
MONOCYTES NFR BLD AUTO: 9 % (ref 4–12)
MUCOUS THREADS UR QL AUTO: ABNORMAL
NEUTROPHILS # BLD AUTO: 7.28 THOUSANDS/ÂΜL (ref 1.85–7.62)
NEUTROPHILS # BLD AUTO: 7.34 THOUSANDS/ÂΜL (ref 1.85–7.62)
NEUTROPHILS # BLD AUTO: 8.61 THOUSANDS/ÂΜL (ref 1.85–7.62)
NEUTROPHILS # BLD AUTO: 8.81 THOUSANDS/ÂΜL (ref 1.85–7.62)
NEUTS SEG NFR BLD AUTO: 78 % (ref 43–75)
NEUTS SEG NFR BLD AUTO: 79 % (ref 43–75)
NEUTS SEG NFR BLD AUTO: 83 % (ref 43–75)
NEUTS SEG NFR BLD AUTO: 87 % (ref 43–75)
NEUTS SEG NFR BLD AUTO: 9 %
NITRITE UR QL STRIP: NEGATIVE
NON-SQ EPI CELLS URNS QL MICRO: ABNORMAL /HPF
NRBC BLD AUTO-RTO: 0 /100 WBCS
OSMOLALITY UR: 260 MMOL/KG
PH BODY FLUID: 7.5
PH UR STRIP.AUTO: 5.5 [PH]
PH UR STRIP.AUTO: 6 [PH]
PH UR STRIP.AUTO: 6 [PH]
PLATELET # BLD AUTO: 136 THOUSANDS/UL (ref 149–390)
PLATELET # BLD AUTO: 141 THOUSANDS/UL (ref 149–390)
PLATELET # BLD AUTO: 146 THOUSANDS/UL (ref 149–390)
PLATELET # BLD AUTO: 147 THOUSANDS/UL (ref 149–390)
PLATELET # BLD AUTO: 153 THOUSANDS/UL (ref 149–390)
PLATELET # BLD AUTO: 154 THOUSANDS/UL (ref 149–390)
PLATELET # BLD AUTO: 164 THOUSANDS/UL (ref 149–390)
PLATELET # BLD AUTO: 177 THOUSANDS/UL (ref 149–390)
PMV BLD AUTO: 10.9 FL (ref 8.9–12.7)
PMV BLD AUTO: 10.9 FL (ref 8.9–12.7)
PMV BLD AUTO: 11.1 FL (ref 8.9–12.7)
PMV BLD AUTO: 11.2 FL (ref 8.9–12.7)
PMV BLD AUTO: 11.2 FL (ref 8.9–12.7)
PMV BLD AUTO: 11.3 FL (ref 8.9–12.7)
PMV BLD AUTO: 11.9 FL (ref 8.9–12.7)
PMV BLD AUTO: 12.1 FL (ref 8.9–12.7)
POTASSIUM SERPL-SCNC: 3.1 MMOL/L (ref 3.5–5.3)
POTASSIUM SERPL-SCNC: 3.3 MMOL/L (ref 3.5–5.3)
POTASSIUM SERPL-SCNC: 3.4 MMOL/L (ref 3.5–5.3)
POTASSIUM SERPL-SCNC: 3.8 MMOL/L (ref 3.5–5.3)
POTASSIUM SERPL-SCNC: 3.8 MMOL/L (ref 3.5–5.3)
POTASSIUM SERPL-SCNC: 4.1 MMOL/L (ref 3.5–5.3)
POTASSIUM SERPL-SCNC: 4.7 MMOL/L (ref 3.5–5.3)
POTASSIUM SERPL-SCNC: 5 MMOL/L (ref 3.5–5.3)
PROT FLD-MCNC: <3 G/DL
PROT SERPL-MCNC: 5.2 G/DL (ref 6.4–8.4)
PROT SERPL-MCNC: 5.3 G/DL (ref 6.4–8.4)
PROT SERPL-MCNC: 5.9 G/DL (ref 6.4–8.4)
PROT SERPL-MCNC: 6 G/DL (ref 6.4–8.4)
PROT UR STRIP-MCNC: ABNORMAL MG/DL
PROT UR-MCNC: 127 MG/DL
PROT/CREAT UR: 3.96 MG/G{CREAT} (ref 0–0.1)
RBC # BLD AUTO: 4.09 MILLION/UL (ref 3.88–5.62)
RBC # BLD AUTO: 4.17 MILLION/UL (ref 3.88–5.62)
RBC # BLD AUTO: 4.17 MILLION/UL (ref 3.88–5.62)
RBC # BLD AUTO: 4.23 MILLION/UL (ref 3.88–5.62)
RBC # BLD AUTO: 4.29 MILLION/UL (ref 3.88–5.62)
RBC # BLD AUTO: 4.34 MILLION/UL (ref 3.88–5.62)
RBC # BLD AUTO: 4.56 MILLION/UL (ref 3.88–5.62)
RBC # BLD AUTO: 4.74 MILLION/UL (ref 3.88–5.62)
RBC #/AREA URNS AUTO: ABNORMAL /HPF
SITE: NORMAL
SODIUM 24H UR-SCNC: 21 MOL/L
SODIUM SERPL-SCNC: 135 MMOL/L (ref 135–147)
SODIUM SERPL-SCNC: 137 MMOL/L (ref 135–147)
SODIUM SERPL-SCNC: 139 MMOL/L (ref 135–147)
SODIUM SERPL-SCNC: 140 MMOL/L (ref 135–147)
SODIUM SERPL-SCNC: 141 MMOL/L (ref 135–147)
SODIUM SERPL-SCNC: 141 MMOL/L (ref 135–147)
SP GR UR STRIP.AUTO: 1.01
SP GR UR STRIP.AUTO: 1.01
SP GR UR STRIP.AUTO: 1.02
TOTAL CELLS COUNTED SPEC: 100
TOTAL PROTEIN FLUID: 1.2 G/DL
UROBILINOGEN UR QL STRIP.AUTO: 0.2 E.U./DL
WBC # BLD AUTO: 10.47 THOUSAND/UL (ref 4.31–10.16)
WBC # BLD AUTO: 11.21 THOUSAND/UL (ref 4.31–10.16)
WBC # BLD AUTO: 8.11 THOUSAND/UL (ref 4.31–10.16)
WBC # BLD AUTO: 8.42 THOUSAND/UL (ref 4.31–10.16)
WBC # BLD AUTO: 8.74 THOUSAND/UL (ref 4.31–10.16)
WBC # BLD AUTO: 9.31 THOUSAND/UL (ref 4.31–10.16)
WBC # BLD AUTO: 9.59 THOUSAND/UL (ref 4.31–10.16)
WBC # BLD AUTO: 9.84 THOUSAND/UL (ref 4.31–10.16)
WBC # FLD MANUAL: 210 /UL
WBC #/AREA URNS AUTO: ABNORMAL /HPF

## 2024-01-01 PROCEDURE — 99285 EMERGENCY DEPT VISIT HI MDM: CPT | Performed by: EMERGENCY MEDICINE

## 2024-01-01 PROCEDURE — 84484 ASSAY OF TROPONIN QUANT: CPT

## 2024-01-01 PROCEDURE — G0299 HHS/HOSPICE OF RN EA 15 MIN: HCPCS

## 2024-01-01 PROCEDURE — 97116 GAIT TRAINING THERAPY: CPT

## 2024-01-01 PROCEDURE — 99496 TRANSJ CARE MGMT HIGH F2F 7D: CPT | Performed by: FAMILY MEDICINE

## 2024-01-01 PROCEDURE — 81003 URINALYSIS AUTO W/O SCOPE: CPT | Performed by: NURSE PRACTITIONER

## 2024-01-01 PROCEDURE — 85025 COMPLETE CBC W/AUTO DIFF WBC: CPT

## 2024-01-01 PROCEDURE — 89051 BODY FLUID CELL COUNT: CPT | Performed by: NURSE PRACTITIONER

## 2024-01-01 PROCEDURE — 99233 SBSQ HOSP IP/OBS HIGH 50: CPT | Performed by: NURSE PRACTITIONER

## 2024-01-01 PROCEDURE — 92610 EVALUATE SWALLOWING FUNCTION: CPT

## 2024-01-01 PROCEDURE — 80053 COMPREHEN METABOLIC PANEL: CPT

## 2024-01-01 PROCEDURE — 82948 REAGENT STRIP/BLOOD GLUCOSE: CPT

## 2024-01-01 PROCEDURE — 80048 BASIC METABOLIC PNL TOTAL CA: CPT | Performed by: NURSE PRACTITIONER

## 2024-01-01 PROCEDURE — 94640 AIRWAY INHALATION TREATMENT: CPT

## 2024-01-01 PROCEDURE — 83986 ASSAY PH BODY FLUID NOS: CPT | Performed by: NURSE PRACTITIONER

## 2024-01-01 PROCEDURE — 94664 DEMO&/EVAL PT USE INHALER: CPT

## 2024-01-01 PROCEDURE — 92611 MOTION FLUOROSCOPY/SWALLOW: CPT

## 2024-01-01 PROCEDURE — 94760 N-INVAS EAR/PLS OXIMETRY 1: CPT

## 2024-01-01 PROCEDURE — 99232 SBSQ HOSP IP/OBS MODERATE 35: CPT | Performed by: INTERNAL MEDICINE

## 2024-01-01 PROCEDURE — 99232 SBSQ HOSP IP/OBS MODERATE 35: CPT | Performed by: NURSE PRACTITIONER

## 2024-01-01 PROCEDURE — 81001 URINALYSIS AUTO W/SCOPE: CPT

## 2024-01-01 PROCEDURE — 71045 X-RAY EXAM CHEST 1 VIEW: CPT

## 2024-01-01 PROCEDURE — 80053 COMPREHEN METABOLIC PANEL: CPT | Performed by: NURSE PRACTITIONER

## 2024-01-01 PROCEDURE — 88305 TISSUE EXAM BY PATHOLOGIST: CPT | Performed by: STUDENT IN AN ORGANIZED HEALTH CARE EDUCATION/TRAINING PROGRAM

## 2024-01-01 PROCEDURE — 36415 COLL VENOUS BLD VENIPUNCTURE: CPT

## 2024-01-01 PROCEDURE — 82945 GLUCOSE OTHER FLUID: CPT | Performed by: NURSE PRACTITIONER

## 2024-01-01 PROCEDURE — 99285 EMERGENCY DEPT VISIT HI MDM: CPT

## 2024-01-01 PROCEDURE — 99222 1ST HOSP IP/OBS MODERATE 55: CPT | Performed by: NURSE PRACTITIONER

## 2024-01-01 PROCEDURE — NC001 PR NO CHARGE: Performed by: INTERNAL MEDICINE

## 2024-01-01 PROCEDURE — G0151 HHCP-SERV OF PT,EA 15 MIN: HCPCS

## 2024-01-01 PROCEDURE — 83615 LACTATE (LD) (LDH) ENZYME: CPT | Performed by: NURSE PRACTITIONER

## 2024-01-01 PROCEDURE — 85025 COMPLETE CBC W/AUTO DIFF WBC: CPT | Performed by: NURSE PRACTITIONER

## 2024-01-01 PROCEDURE — 83935 ASSAY OF URINE OSMOLALITY: CPT | Performed by: NURSE PRACTITIONER

## 2024-01-01 PROCEDURE — 81003 URINALYSIS AUTO W/O SCOPE: CPT

## 2024-01-01 PROCEDURE — 87086 URINE CULTURE/COLONY COUNT: CPT

## 2024-01-01 PROCEDURE — 85027 COMPLETE CBC AUTOMATED: CPT | Performed by: NURSE PRACTITIONER

## 2024-01-01 PROCEDURE — 83735 ASSAY OF MAGNESIUM: CPT

## 2024-01-01 PROCEDURE — 87070 CULTURE OTHR SPECIMN AEROBIC: CPT | Performed by: NURSE PRACTITIONER

## 2024-01-01 PROCEDURE — 32555 ASPIRATE PLEURA W/ IMAGING: CPT

## 2024-01-01 PROCEDURE — 84157 ASSAY OF PROTEIN OTHER: CPT | Performed by: NURSE PRACTITIONER

## 2024-01-01 PROCEDURE — 83880 ASSAY OF NATRIURETIC PEPTIDE: CPT | Performed by: NURSE PRACTITIONER

## 2024-01-01 PROCEDURE — 83735 ASSAY OF MAGNESIUM: CPT | Performed by: INTERNAL MEDICINE

## 2024-01-01 PROCEDURE — 400013 VN SOC

## 2024-01-01 PROCEDURE — 97163 PT EVAL HIGH COMPLEX 45 MIN: CPT

## 2024-01-01 PROCEDURE — 82570 ASSAY OF URINE CREATININE: CPT | Performed by: NURSE PRACTITIONER

## 2024-01-01 PROCEDURE — 92526 ORAL FUNCTION THERAPY: CPT

## 2024-01-01 PROCEDURE — 81001 URINALYSIS AUTO W/SCOPE: CPT | Performed by: NURSE PRACTITIONER

## 2024-01-01 PROCEDURE — 74176 CT ABD & PELVIS W/O CONTRAST: CPT

## 2024-01-01 PROCEDURE — 74230 X-RAY XM SWLNG FUNCJ C+: CPT

## 2024-01-01 PROCEDURE — 87205 SMEAR GRAM STAIN: CPT | Performed by: NURSE PRACTITIONER

## 2024-01-01 PROCEDURE — 88112 CYTOPATH CELL ENHANCE TECH: CPT | Performed by: STUDENT IN AN ORGANIZED HEALTH CARE EDUCATION/TRAINING PROGRAM

## 2024-01-01 PROCEDURE — 84156 ASSAY OF PROTEIN URINE: CPT | Performed by: NURSE PRACTITIONER

## 2024-01-01 PROCEDURE — 83690 ASSAY OF LIPASE: CPT

## 2024-01-01 PROCEDURE — 93005 ELECTROCARDIOGRAM TRACING: CPT

## 2024-01-01 PROCEDURE — 76705 ECHO EXAM OF ABDOMEN: CPT

## 2024-01-01 PROCEDURE — 83880 ASSAY OF NATRIURETIC PEPTIDE: CPT | Performed by: EMERGENCY MEDICINE

## 2024-01-01 PROCEDURE — 99233 SBSQ HOSP IP/OBS HIGH 50: CPT

## 2024-01-01 PROCEDURE — 99223 1ST HOSP IP/OBS HIGH 75: CPT | Performed by: INTERNAL MEDICINE

## 2024-01-01 PROCEDURE — 32555 ASPIRATE PLEURA W/ IMAGING: CPT | Performed by: PHYSICIAN ASSISTANT

## 2024-01-01 PROCEDURE — 84300 ASSAY OF URINE SODIUM: CPT | Performed by: NURSE PRACTITIONER

## 2024-01-01 PROCEDURE — 97110 THERAPEUTIC EXERCISES: CPT

## 2024-01-01 PROCEDURE — 97166 OT EVAL MOD COMPLEX 45 MIN: CPT

## 2024-01-01 PROCEDURE — 71250 CT THORAX DX C-: CPT

## 2024-01-01 PROCEDURE — 0W993ZZ DRAINAGE OF RIGHT PLEURAL CAVITY, PERCUTANEOUS APPROACH: ICD-10-PCS | Performed by: RADIOLOGY

## 2024-01-01 RX ORDER — XYLITOL/YERBA SANTA
1 AEROSOL, SPRAY WITH PUMP (ML) MUCOUS MEMBRANE EVERY 4 HOURS PRN
Status: DISCONTINUED | OUTPATIENT
Start: 2024-01-01 | End: 2024-01-01 | Stop reason: HOSPADM

## 2024-01-01 RX ORDER — GABAPENTIN 100 MG/1
100 CAPSULE ORAL
Status: COMPLETED | OUTPATIENT
Start: 2024-01-01 | End: 2024-01-01

## 2024-01-01 RX ORDER — MIDODRINE HYDROCHLORIDE 5 MG/1
2.5 TABLET ORAL ONCE
Status: COMPLETED | OUTPATIENT
Start: 2024-01-01 | End: 2024-01-01

## 2024-01-01 RX ORDER — LIDOCAINE 50 MG/G
1 PATCH TOPICAL DAILY
Status: DISCONTINUED | OUTPATIENT
Start: 2024-01-01 | End: 2024-01-01

## 2024-01-01 RX ORDER — GUAIFENESIN/DEXTROMETHORPHAN 100-10MG/5
5 SYRUP ORAL 3 TIMES DAILY PRN
Status: DISCONTINUED | OUTPATIENT
Start: 2024-01-01 | End: 2024-01-01 | Stop reason: HOSPADM

## 2024-01-01 RX ORDER — MIDODRINE HYDROCHLORIDE 5 MG/1
2.5 TABLET ORAL
Status: DISCONTINUED | OUTPATIENT
Start: 2024-01-01 | End: 2024-01-01

## 2024-01-01 RX ORDER — LIDOCAINE 50 MG/G
2 PATCH TOPICAL DAILY
Status: DISCONTINUED | OUTPATIENT
Start: 2024-01-01 | End: 2024-01-01 | Stop reason: HOSPADM

## 2024-01-01 RX ORDER — ECHINACEA PURPUREA EXTRACT 125 MG
1 TABLET ORAL
Status: DISCONTINUED | OUTPATIENT
Start: 2024-01-01 | End: 2024-01-01 | Stop reason: HOSPADM

## 2024-01-01 RX ORDER — OXYCODONE HYDROCHLORIDE 10 MG/1
10 TABLET ORAL EVERY 6 HOURS PRN
Status: DISCONTINUED | OUTPATIENT
Start: 2024-01-01 | End: 2024-01-01 | Stop reason: HOSPADM

## 2024-01-01 RX ORDER — ATORVASTATIN CALCIUM 40 MG/1
80 TABLET, FILM COATED ORAL
Status: DISCONTINUED | OUTPATIENT
Start: 2024-01-01 | End: 2024-01-01 | Stop reason: HOSPADM

## 2024-01-01 RX ORDER — FUROSEMIDE 10 MG/ML
40 INJECTION INTRAMUSCULAR; INTRAVENOUS
Status: DISCONTINUED | OUTPATIENT
Start: 2024-01-01 | End: 2024-01-01

## 2024-01-01 RX ORDER — METOPROLOL SUCCINATE 25 MG/1
12.5 TABLET, EXTENDED RELEASE ORAL DAILY
Status: DISCONTINUED | OUTPATIENT
Start: 2024-01-01 | End: 2024-01-01 | Stop reason: HOSPADM

## 2024-01-01 RX ORDER — FUROSEMIDE 10 MG/ML
60 INJECTION INTRAMUSCULAR; INTRAVENOUS ONCE
Status: DISCONTINUED | OUTPATIENT
Start: 2024-01-01 | End: 2024-01-01 | Stop reason: HOSPADM

## 2024-01-01 RX ORDER — POTASSIUM CHLORIDE 20 MEQ/1
20 TABLET, EXTENDED RELEASE ORAL ONCE
Status: COMPLETED | OUTPATIENT
Start: 2024-01-01 | End: 2024-01-01

## 2024-01-01 RX ORDER — HEPARIN SODIUM 5000 [USP'U]/ML
5000 INJECTION, SOLUTION INTRAVENOUS; SUBCUTANEOUS EVERY 8 HOURS SCHEDULED
Status: DISCONTINUED | OUTPATIENT
Start: 2024-01-01 | End: 2024-01-01 | Stop reason: HOSPADM

## 2024-01-01 RX ORDER — ASPIRIN 81 MG/1
81 TABLET, CHEWABLE ORAL DAILY
Status: DISCONTINUED | OUTPATIENT
Start: 2024-01-01 | End: 2024-01-01 | Stop reason: HOSPADM

## 2024-01-01 RX ORDER — LEVALBUTEROL INHALATION SOLUTION 1.25 MG/3ML
1.25 SOLUTION RESPIRATORY (INHALATION)
Status: DISCONTINUED | OUTPATIENT
Start: 2024-01-01 | End: 2024-01-01 | Stop reason: HOSPADM

## 2024-01-01 RX ORDER — PANTOPRAZOLE SODIUM 20 MG/1
20 TABLET, DELAYED RELEASE ORAL
Status: DISCONTINUED | OUTPATIENT
Start: 2024-01-01 | End: 2024-01-01 | Stop reason: HOSPADM

## 2024-01-01 RX ORDER — LANOLIN ALCOHOL/MO/W.PET/CERES
400 CREAM (GRAM) TOPICAL DAILY
Status: DISCONTINUED | OUTPATIENT
Start: 2024-01-01 | End: 2024-01-01 | Stop reason: HOSPADM

## 2024-01-01 RX ORDER — POTASSIUM CHLORIDE 20MEQ/15ML
40 LIQUID (ML) ORAL DAILY
Status: DISCONTINUED | OUTPATIENT
Start: 2024-01-01 | End: 2024-01-01 | Stop reason: HOSPADM

## 2024-01-01 RX ORDER — FUROSEMIDE 10 MG/ML
40 INJECTION INTRAMUSCULAR; INTRAVENOUS 2 TIMES DAILY
Status: DISCONTINUED | OUTPATIENT
Start: 2024-01-01 | End: 2024-01-01 | Stop reason: HOSPADM

## 2024-01-01 RX ORDER — FLUTICASONE PROPIONATE 50 MCG
1 SPRAY, SUSPENSION (ML) NASAL 2 TIMES DAILY
Status: DISCONTINUED | OUTPATIENT
Start: 2024-01-01 | End: 2024-01-01 | Stop reason: HOSPADM

## 2024-01-01 RX ORDER — BUDESONIDE 0.5 MG/2ML
0.5 INHALANT ORAL 2 TIMES DAILY
Status: DISCONTINUED | OUTPATIENT
Start: 2024-01-01 | End: 2024-01-01 | Stop reason: HOSPADM

## 2024-01-01 RX ORDER — MIDODRINE HYDROCHLORIDE 5 MG/1
5 TABLET ORAL
Status: DISCONTINUED | OUTPATIENT
Start: 2024-01-01 | End: 2024-01-01 | Stop reason: HOSPADM

## 2024-01-01 RX ORDER — FUROSEMIDE 40 MG/1
40 TABLET ORAL DAILY
Status: DISCONTINUED | OUTPATIENT
Start: 2024-01-01 | End: 2024-01-01 | Stop reason: HOSPADM

## 2024-01-01 RX ORDER — SODIUM CHLORIDE 9 MG/ML
75 INJECTION, SOLUTION INTRAVENOUS CONTINUOUS
Status: DISCONTINUED | OUTPATIENT
Start: 2024-01-01 | End: 2024-01-01

## 2024-01-01 RX ORDER — HYDROCODONE BITARTRATE AND ACETAMINOPHEN 5; 325 MG/1; MG/1
1 TABLET ORAL EVERY 6 HOURS PRN
Status: DISCONTINUED | OUTPATIENT
Start: 2024-01-01 | End: 2024-01-01 | Stop reason: HOSPADM

## 2024-01-01 RX ORDER — ASPIRIN 81 MG/1
81 TABLET, CHEWABLE ORAL
Status: DISCONTINUED | OUTPATIENT
Start: 2024-01-01 | End: 2024-01-01 | Stop reason: HOSPADM

## 2024-01-01 RX ORDER — POTASSIUM CHLORIDE 20 MEQ/1
40 TABLET, EXTENDED RELEASE ORAL 2 TIMES DAILY
Status: COMPLETED | OUTPATIENT
Start: 2024-01-01 | End: 2024-01-01

## 2024-01-01 RX ORDER — ONDANSETRON 2 MG/ML
4 INJECTION INTRAMUSCULAR; INTRAVENOUS EVERY 6 HOURS PRN
Status: DISCONTINUED | OUTPATIENT
Start: 2024-01-01 | End: 2024-01-01 | Stop reason: HOSPADM

## 2024-01-01 RX ORDER — MELATONIN
1000 DAILY
Status: DISCONTINUED | OUTPATIENT
Start: 2024-01-01 | End: 2024-01-01 | Stop reason: HOSPADM

## 2024-01-01 RX ORDER — GABAPENTIN 300 MG/1
300 TABLET ORAL
COMMUNITY
End: 2024-01-01

## 2024-01-01 RX ORDER — IPRATROPIUM BROMIDE AND ALBUTEROL SULFATE 2.5; .5 MG/3ML; MG/3ML
3 SOLUTION RESPIRATORY (INHALATION) 3 TIMES DAILY
Status: DISCONTINUED | OUTPATIENT
Start: 2024-01-01 | End: 2024-01-01

## 2024-01-01 RX ORDER — ALPRAZOLAM 0.25 MG/1
0.25 TABLET ORAL
Status: DISCONTINUED | OUTPATIENT
Start: 2024-01-01 | End: 2024-01-01 | Stop reason: HOSPADM

## 2024-01-01 RX ORDER — FORMOTEROL FUMARATE DIHYDRATE 20 UG/2ML
20 SOLUTION RESPIRATORY (INHALATION) 2 TIMES DAILY
Status: DISCONTINUED | OUTPATIENT
Start: 2024-01-01 | End: 2024-01-01 | Stop reason: HOSPADM

## 2024-01-01 RX ORDER — PANTOPRAZOLE SODIUM 40 MG/1
40 TABLET, DELAYED RELEASE ORAL
Status: DISCONTINUED | OUTPATIENT
Start: 2024-01-01 | End: 2024-01-01 | Stop reason: HOSPADM

## 2024-01-01 RX ORDER — METHOCARBAMOL 500 MG/1
500 TABLET, FILM COATED ORAL 3 TIMES DAILY PRN
Status: DISCONTINUED | OUTPATIENT
Start: 2024-01-01 | End: 2024-01-01 | Stop reason: HOSPADM

## 2024-01-01 RX ORDER — FAMOTIDINE 20 MG/1
10 TABLET, FILM COATED ORAL
Status: DISCONTINUED | OUTPATIENT
Start: 2024-01-01 | End: 2024-01-01 | Stop reason: HOSPADM

## 2024-01-01 RX ORDER — FAMOTIDINE 20 MG/1
20 TABLET, FILM COATED ORAL
Status: DISCONTINUED | OUTPATIENT
Start: 2024-01-01 | End: 2024-01-01 | Stop reason: HOSPADM

## 2024-01-01 RX ORDER — GABAPENTIN 100 MG/1
200 CAPSULE ORAL
Status: COMPLETED | OUTPATIENT
Start: 2024-01-01 | End: 2024-01-01

## 2024-01-01 RX ORDER — MAGNESIUM OXIDE 400 MG/1
400 TABLET ORAL DAILY
Qty: 30 TABLET | Refills: 5 | OUTPATIENT
Start: 2024-01-01 | End: 2024-01-01

## 2024-01-01 RX ORDER — ACETAMINOPHEN 325 MG/1
650 TABLET ORAL EVERY 4 HOURS PRN
Status: DISCONTINUED | OUTPATIENT
Start: 2024-01-01 | End: 2024-01-01 | Stop reason: HOSPADM

## 2024-01-01 RX ORDER — MIDODRINE HYDROCHLORIDE 5 MG/1
5 TABLET ORAL
Qty: 90 TABLET | Refills: 0 | OUTPATIENT
Start: 2024-01-01 | End: 2024-01-01

## 2024-01-01 RX ORDER — MIDODRINE HYDROCHLORIDE 5 MG/1
2.5 TABLET ORAL ONCE
Status: DISCONTINUED | OUTPATIENT
Start: 2024-01-01 | End: 2024-01-01

## 2024-01-01 RX ORDER — FUROSEMIDE 10 MG/ML
80 INJECTION INTRAMUSCULAR; INTRAVENOUS ONCE
Status: DISCONTINUED | OUTPATIENT
Start: 2024-01-01 | End: 2024-01-01

## 2024-01-01 RX ORDER — FUROSEMIDE 10 MG/ML
80 INJECTION INTRAMUSCULAR; INTRAVENOUS ONCE
Status: COMPLETED | OUTPATIENT
Start: 2024-01-01 | End: 2024-01-01

## 2024-01-01 RX ORDER — LIDOCAINE HYDROCHLORIDE 10 MG/ML
INJECTION, SOLUTION EPIDURAL; INFILTRATION; INTRACAUDAL; PERINEURAL AS NEEDED
Status: COMPLETED | OUTPATIENT
Start: 2024-01-01 | End: 2024-01-01

## 2024-01-01 RX ORDER — IPRATROPIUM BROMIDE AND ALBUTEROL SULFATE 2.5; .5 MG/3ML; MG/3ML
3 SOLUTION RESPIRATORY (INHALATION)
Status: DISCONTINUED | OUTPATIENT
Start: 2024-01-01 | End: 2024-01-01

## 2024-01-01 RX ORDER — AMOXICILLIN AND CLAVULANATE POTASSIUM 875; 125 MG/1; MG/1
1 TABLET, FILM COATED ORAL EVERY 12 HOURS SCHEDULED
Qty: 14 TABLET | Refills: 0 | OUTPATIENT
Start: 2024-01-01 | End: 2024-01-01

## 2024-01-01 RX ORDER — OXYCODONE HYDROCHLORIDE 5 MG/1
5 TABLET ORAL EVERY 6 HOURS PRN
Status: DISCONTINUED | OUTPATIENT
Start: 2024-01-01 | End: 2024-01-01 | Stop reason: HOSPADM

## 2024-01-01 RX ADMIN — FUROSEMIDE 80 MG: 10 INJECTION, SOLUTION INTRAMUSCULAR; INTRAVENOUS at 16:22

## 2024-01-01 RX ADMIN — SERTRALINE HYDROCHLORIDE 50 MG: 50 TABLET ORAL at 08:53

## 2024-01-01 RX ADMIN — EMPAGLIFLOZIN 10 MG: 10 TABLET, FILM COATED ORAL at 09:49

## 2024-01-01 RX ADMIN — Medication 1000 UNITS: at 08:18

## 2024-01-01 RX ADMIN — HEPARIN SODIUM 5000 UNITS: 5000 INJECTION, SOLUTION INTRAVENOUS; SUBCUTANEOUS at 06:27

## 2024-01-01 RX ADMIN — LEVALBUTEROL HYDROCHLORIDE 1.25 MG: 1.25 SOLUTION RESPIRATORY (INHALATION) at 13:01

## 2024-01-01 RX ADMIN — IPRATROPIUM BROMIDE 0.5 MG: 0.5 SOLUTION RESPIRATORY (INHALATION) at 20:18

## 2024-01-01 RX ADMIN — IPRATROPIUM BROMIDE 0.5 MG: 0.5 SOLUTION RESPIRATORY (INHALATION) at 07:00

## 2024-01-01 RX ADMIN — HEPARIN SODIUM 5000 UNITS: 5000 INJECTION, SOLUTION INTRAVENOUS; SUBCUTANEOUS at 14:14

## 2024-01-01 RX ADMIN — IPRATROPIUM BROMIDE 0.5 MG: 0.5 SOLUTION RESPIRATORY (INHALATION) at 20:24

## 2024-01-01 RX ADMIN — ASPIRIN 81 MG: 81 TABLET, CHEWABLE ORAL at 21:08

## 2024-01-01 RX ADMIN — MIDODRINE HYDROCHLORIDE 5 MG: 5 TABLET ORAL at 11:08

## 2024-01-01 RX ADMIN — MIDODRINE HYDROCHLORIDE 2.5 MG: 5 TABLET ORAL at 14:54

## 2024-01-01 RX ADMIN — IPRATROPIUM BROMIDE 0.5 MG: 0.5 SOLUTION RESPIRATORY (INHALATION) at 13:20

## 2024-01-01 RX ADMIN — Medication 1000 UNITS: at 08:53

## 2024-01-01 RX ADMIN — FLUTICASONE PROPIONATE 1 SPRAY: 50 SPRAY, METERED NASAL at 08:19

## 2024-01-01 RX ADMIN — IPRATROPIUM BROMIDE 0.5 MG: 0.5 SOLUTION RESPIRATORY (INHALATION) at 08:53

## 2024-01-01 RX ADMIN — Medication 50 MEQ: at 11:55

## 2024-01-01 RX ADMIN — MIDODRINE HYDROCHLORIDE 2.5 MG: 5 TABLET ORAL at 06:08

## 2024-01-01 RX ADMIN — LEVALBUTEROL HYDROCHLORIDE 1.25 MG: 1.25 SOLUTION RESPIRATORY (INHALATION) at 07:45

## 2024-01-01 RX ADMIN — Medication 1000 UNITS: at 09:05

## 2024-01-01 RX ADMIN — POTASSIUM CHLORIDE 40 MEQ: 1500 TABLET, EXTENDED RELEASE ORAL at 17:16

## 2024-01-01 RX ADMIN — IPRATROPIUM BROMIDE 0.5 MG: 0.5 SOLUTION RESPIRATORY (INHALATION) at 13:01

## 2024-01-01 RX ADMIN — MIDODRINE HYDROCHLORIDE 5 MG: 5 TABLET ORAL at 06:27

## 2024-01-01 RX ADMIN — HEPARIN SODIUM 5000 UNITS: 5000 INJECTION, SOLUTION INTRAVENOUS; SUBCUTANEOUS at 22:18

## 2024-01-01 RX ADMIN — MIDODRINE HYDROCHLORIDE 5 MG: 5 TABLET ORAL at 11:23

## 2024-01-01 RX ADMIN — MIDODRINE HYDROCHLORIDE 5 MG: 5 TABLET ORAL at 07:34

## 2024-01-01 RX ADMIN — FAMOTIDINE 10 MG: 20 TABLET, FILM COATED ORAL at 21:08

## 2024-01-01 RX ADMIN — BUDESONIDE INHALATION 0.5 MG: 0.5 SUSPENSION RESPIRATORY (INHALATION) at 20:05

## 2024-01-01 RX ADMIN — SERTRALINE HYDROCHLORIDE 50 MG: 50 TABLET ORAL at 09:20

## 2024-01-01 RX ADMIN — IPRATROPIUM BROMIDE 0.5 MG: 0.5 SOLUTION RESPIRATORY (INHALATION) at 07:45

## 2024-01-01 RX ADMIN — ALPRAZOLAM 0.25 MG: 0.25 TABLET ORAL at 00:39

## 2024-01-01 RX ADMIN — MIDODRINE HYDROCHLORIDE 5 MG: 5 TABLET ORAL at 16:47

## 2024-01-01 RX ADMIN — HEPARIN SODIUM 5000 UNITS: 5000 INJECTION, SOLUTION INTRAVENOUS; SUBCUTANEOUS at 05:21

## 2024-01-01 RX ADMIN — FLUTICASONE PROPIONATE 1 SPRAY: 50 SPRAY, METERED NASAL at 09:20

## 2024-01-01 RX ADMIN — BUDESONIDE INHALATION 0.5 MG: 0.5 SUSPENSION RESPIRATORY (INHALATION) at 07:25

## 2024-01-01 RX ADMIN — LEVALBUTEROL HYDROCHLORIDE 1.25 MG: 1.25 SOLUTION RESPIRATORY (INHALATION) at 20:57

## 2024-01-01 RX ADMIN — SERTRALINE HYDROCHLORIDE 50 MG: 50 TABLET ORAL at 08:19

## 2024-01-01 RX ADMIN — FORMOTEROL FUMARATE DIHYDRATE 20 MCG: 20 SOLUTION RESPIRATORY (INHALATION) at 21:32

## 2024-01-01 RX ADMIN — HEPARIN SODIUM 5000 UNITS: 5000 INJECTION, SOLUTION INTRAVENOUS; SUBCUTANEOUS at 21:44

## 2024-01-01 RX ADMIN — HEPARIN SODIUM 5000 UNITS: 5000 INJECTION, SOLUTION INTRAVENOUS; SUBCUTANEOUS at 21:53

## 2024-01-01 RX ADMIN — GABAPENTIN 200 MG: 100 CAPSULE ORAL at 22:11

## 2024-01-01 RX ADMIN — BUDESONIDE INHALATION 0.5 MG: 0.5 SUSPENSION RESPIRATORY (INHALATION) at 20:57

## 2024-01-01 RX ADMIN — ASPIRIN 81 MG: 81 TABLET, CHEWABLE ORAL at 22:12

## 2024-01-01 RX ADMIN — BUDESONIDE INHALATION 0.5 MG: 0.5 SUSPENSION RESPIRATORY (INHALATION) at 19:24

## 2024-01-01 RX ADMIN — HEPARIN SODIUM 5000 UNITS: 5000 INJECTION, SOLUTION INTRAVENOUS; SUBCUTANEOUS at 21:08

## 2024-01-01 RX ADMIN — ASPIRIN 81 MG: 81 TABLET, CHEWABLE ORAL at 21:44

## 2024-01-01 RX ADMIN — IPRATROPIUM BROMIDE 0.5 MG: 0.5 SOLUTION RESPIRATORY (INHALATION) at 06:15

## 2024-01-01 RX ADMIN — FORMOTEROL FUMARATE DIHYDRATE 20 MCG: 20 SOLUTION RESPIRATORY (INHALATION) at 19:58

## 2024-01-01 RX ADMIN — ASPIRIN 81 MG: 81 TABLET, CHEWABLE ORAL at 21:53

## 2024-01-01 RX ADMIN — PANTOPRAZOLE SODIUM 20 MG: 20 TABLET, DELAYED RELEASE ORAL at 06:08

## 2024-01-01 RX ADMIN — LEVALBUTEROL HYDROCHLORIDE 1.25 MG: 1.25 SOLUTION RESPIRATORY (INHALATION) at 13:15

## 2024-01-01 RX ADMIN — ASPIRIN 81 MG: 81 TABLET, CHEWABLE ORAL at 21:22

## 2024-01-01 RX ADMIN — MIDODRINE HYDROCHLORIDE 5 MG: 5 TABLET ORAL at 09:05

## 2024-01-01 RX ADMIN — HEPARIN SODIUM 5000 UNITS: 5000 INJECTION, SOLUTION INTRAVENOUS; SUBCUTANEOUS at 14:42

## 2024-01-01 RX ADMIN — IPRATROPIUM BROMIDE 0.5 MG: 0.5 SOLUTION RESPIRATORY (INHALATION) at 19:24

## 2024-01-01 RX ADMIN — LIDOCAINE 5% 2 PATCH: 700 PATCH TOPICAL at 21:21

## 2024-01-01 RX ADMIN — LEVALBUTEROL HYDROCHLORIDE 1.25 MG: 1.25 SOLUTION RESPIRATORY (INHALATION) at 20:05

## 2024-01-01 RX ADMIN — HEPARIN SODIUM 5000 UNITS: 5000 INJECTION, SOLUTION INTRAVENOUS; SUBCUTANEOUS at 05:01

## 2024-01-01 RX ADMIN — FORMOTEROL FUMARATE DIHYDRATE 20 MCG: 20 SOLUTION RESPIRATORY (INHALATION) at 06:17

## 2024-01-01 RX ADMIN — HEPARIN SODIUM 5000 UNITS: 5000 INJECTION, SOLUTION INTRAVENOUS; SUBCUTANEOUS at 14:41

## 2024-01-01 RX ADMIN — FORMOTEROL FUMARATE DIHYDRATE 20 MCG: 20 SOLUTION RESPIRATORY (INHALATION) at 20:18

## 2024-01-01 RX ADMIN — POTASSIUM CHLORIDE 20 MEQ: 1500 TABLET, EXTENDED RELEASE ORAL at 16:23

## 2024-01-01 RX ADMIN — BUDESONIDE INHALATION 0.5 MG: 0.5 SUSPENSION RESPIRATORY (INHALATION) at 07:45

## 2024-01-01 RX ADMIN — FUROSEMIDE 40 MG: 10 INJECTION, SOLUTION INTRAMUSCULAR; INTRAVENOUS at 16:30

## 2024-01-01 RX ADMIN — LEVALBUTEROL HYDROCHLORIDE 1.25 MG: 1.25 SOLUTION RESPIRATORY (INHALATION) at 13:06

## 2024-01-01 RX ADMIN — MIDODRINE HYDROCHLORIDE 5 MG: 5 TABLET ORAL at 17:16

## 2024-01-01 RX ADMIN — LIDOCAINE 5% 2 PATCH: 700 PATCH TOPICAL at 21:53

## 2024-01-01 RX ADMIN — SERTRALINE HYDROCHLORIDE 50 MG: 50 TABLET ORAL at 09:45

## 2024-01-01 RX ADMIN — FLUTICASONE PROPIONATE 1 SPRAY: 50 SPRAY, METERED NASAL at 09:06

## 2024-01-01 RX ADMIN — HEPARIN SODIUM 5000 UNITS: 5000 INJECTION, SOLUTION INTRAVENOUS; SUBCUTANEOUS at 05:05

## 2024-01-01 RX ADMIN — MIDODRINE HYDROCHLORIDE 5 MG: 5 TABLET ORAL at 12:09

## 2024-01-01 RX ADMIN — SALINE NASAL SPRAY 1 SPRAY: 1.5 SOLUTION NASAL at 21:28

## 2024-01-01 RX ADMIN — PANTOPRAZOLE SODIUM 20 MG: 20 TABLET, DELAYED RELEASE ORAL at 05:05

## 2024-01-01 RX ADMIN — LEVALBUTEROL HYDROCHLORIDE 1.25 MG: 1.25 SOLUTION RESPIRATORY (INHALATION) at 13:48

## 2024-01-01 RX ADMIN — IPRATROPIUM BROMIDE 0.5 MG: 0.5 SOLUTION RESPIRATORY (INHALATION) at 07:02

## 2024-01-01 RX ADMIN — HEPARIN SODIUM 5000 UNITS: 5000 INJECTION, SOLUTION INTRAVENOUS; SUBCUTANEOUS at 06:08

## 2024-01-01 RX ADMIN — LEVALBUTEROL HYDROCHLORIDE 1.25 MG: 1.25 SOLUTION RESPIRATORY (INHALATION) at 20:18

## 2024-01-01 RX ADMIN — GABAPENTIN 100 MG: 100 CAPSULE ORAL at 21:22

## 2024-01-01 RX ADMIN — MIDODRINE HYDROCHLORIDE 2.5 MG: 5 TABLET ORAL at 17:33

## 2024-01-01 RX ADMIN — MIDODRINE HYDROCHLORIDE 5 MG: 5 TABLET ORAL at 11:42

## 2024-01-01 RX ADMIN — BUDESONIDE INHALATION 0.5 MG: 0.5 SUSPENSION RESPIRATORY (INHALATION) at 06:15

## 2024-01-01 RX ADMIN — Medication 20 MCG/MIN: at 11:56

## 2024-01-01 RX ADMIN — LEVALBUTEROL HYDROCHLORIDE 1.25 MG: 1.25 SOLUTION RESPIRATORY (INHALATION) at 19:24

## 2024-01-01 RX ADMIN — LIDOCAINE HYDROCHLORIDE 10 ML: 10 INJECTION, SOLUTION EPIDURAL; INFILTRATION; INTRACAUDAL; PERINEURAL at 10:42

## 2024-01-01 RX ADMIN — FAMOTIDINE 10 MG: 20 TABLET, FILM COATED ORAL at 21:52

## 2024-01-01 RX ADMIN — BUDESONIDE INHALATION 0.5 MG: 0.5 SUSPENSION RESPIRATORY (INHALATION) at 08:53

## 2024-01-01 RX ADMIN — LEVALBUTEROL HYDROCHLORIDE 1.25 MG: 1.25 SOLUTION RESPIRATORY (INHALATION) at 08:53

## 2024-01-01 RX ADMIN — BUDESONIDE INHALATION 0.5 MG: 0.5 SUSPENSION RESPIRATORY (INHALATION) at 20:24

## 2024-01-01 RX ADMIN — FLUTICASONE PROPIONATE 1 SPRAY: 50 SPRAY, METERED NASAL at 08:55

## 2024-01-01 RX ADMIN — FORMOTEROL FUMARATE DIHYDRATE 20 MCG: 20 SOLUTION RESPIRATORY (INHALATION) at 07:24

## 2024-01-01 RX ADMIN — IPRATROPIUM BROMIDE 0.5 MG: 0.5 SOLUTION RESPIRATORY (INHALATION) at 13:06

## 2024-01-01 RX ADMIN — LEVALBUTEROL HYDROCHLORIDE 1.25 MG: 1.25 SOLUTION RESPIRATORY (INHALATION) at 06:15

## 2024-01-01 RX ADMIN — FLUTICASONE PROPIONATE 1 SPRAY: 50 SPRAY, METERED NASAL at 17:44

## 2024-01-01 RX ADMIN — FUROSEMIDE 40 MG: 10 INJECTION, SOLUTION INTRAMUSCULAR; INTRAVENOUS at 14:54

## 2024-01-01 RX ADMIN — MIDODRINE HYDROCHLORIDE 2.5 MG: 5 TABLET ORAL at 11:21

## 2024-01-01 RX ADMIN — FLUTICASONE PROPIONATE 1 SPRAY: 50 SPRAY, METERED NASAL at 17:16

## 2024-01-01 RX ADMIN — IPRATROPIUM BROMIDE 0.5 MG: 0.5 SOLUTION RESPIRATORY (INHALATION) at 13:15

## 2024-01-01 RX ADMIN — GABAPENTIN 100 MG: 100 CAPSULE ORAL at 21:52

## 2024-01-01 RX ADMIN — FORMOTEROL FUMARATE DIHYDRATE 20 MCG: 20 SOLUTION RESPIRATORY (INHALATION) at 09:15

## 2024-01-01 RX ADMIN — FORMOTEROL FUMARATE DIHYDRATE 20 MCG: 20 SOLUTION RESPIRATORY (INHALATION) at 21:04

## 2024-01-01 RX ADMIN — IPRATROPIUM BROMIDE AND ALBUTEROL SULFATE 3 ML: 2.5; .5 SOLUTION RESPIRATORY (INHALATION) at 04:17

## 2024-01-01 RX ADMIN — LEVALBUTEROL HYDROCHLORIDE 1.25 MG: 1.25 SOLUTION RESPIRATORY (INHALATION) at 20:24

## 2024-01-01 RX ADMIN — Medication 1000 UNITS: at 09:45

## 2024-01-01 RX ADMIN — HEPARIN SODIUM 5000 UNITS: 5000 INJECTION, SOLUTION INTRAVENOUS; SUBCUTANEOUS at 13:42

## 2024-01-01 RX ADMIN — PANTOPRAZOLE SODIUM 20 MG: 20 TABLET, DELAYED RELEASE ORAL at 05:01

## 2024-01-01 RX ADMIN — LEVALBUTEROL HYDROCHLORIDE 1.25 MG: 1.25 SOLUTION RESPIRATORY (INHALATION) at 07:02

## 2024-01-01 RX ADMIN — LEVALBUTEROL HYDROCHLORIDE 1.25 MG: 1.25 SOLUTION RESPIRATORY (INHALATION) at 13:20

## 2024-01-01 RX ADMIN — ALPRAZOLAM 0.25 MG: 0.25 TABLET ORAL at 01:57

## 2024-01-01 RX ADMIN — LEVALBUTEROL HYDROCHLORIDE 1.25 MG: 1.25 SOLUTION RESPIRATORY (INHALATION) at 07:00

## 2024-01-01 RX ADMIN — HYDROCODONE BITARTRATE AND ACETAMINOPHEN 1 TABLET: 5; 325 TABLET ORAL at 02:34

## 2024-01-01 RX ADMIN — BUDESONIDE INHALATION 0.5 MG: 0.5 SUSPENSION RESPIRATORY (INHALATION) at 20:18

## 2024-01-01 RX ADMIN — IPRATROPIUM BROMIDE 0.5 MG: 0.5 SOLUTION RESPIRATORY (INHALATION) at 13:48

## 2024-01-01 RX ADMIN — MIDODRINE HYDROCHLORIDE 5 MG: 5 TABLET ORAL at 16:23

## 2024-01-01 RX ADMIN — HEPARIN SODIUM 5000 UNITS: 5000 INJECTION, SOLUTION INTRAVENOUS; SUBCUTANEOUS at 21:22

## 2024-01-01 RX ADMIN — PANTOPRAZOLE SODIUM 20 MG: 20 TABLET, DELAYED RELEASE ORAL at 05:21

## 2024-01-01 RX ADMIN — FLUTICASONE PROPIONATE 1 SPRAY: 50 SPRAY, METERED NASAL at 18:49

## 2024-01-01 RX ADMIN — FAMOTIDINE 10 MG: 20 TABLET, FILM COATED ORAL at 21:44

## 2024-01-01 RX ADMIN — SODIUM CHLORIDE 75 ML/HR: 0.9 INJECTION, SOLUTION INTRAVENOUS at 06:11

## 2024-01-01 RX ADMIN — LIDOCAINE 5% 2 PATCH: 700 PATCH TOPICAL at 21:45

## 2024-01-01 RX ADMIN — FLUTICASONE PROPIONATE 1 SPRAY: 50 SPRAY, METERED NASAL at 17:37

## 2024-01-01 RX ADMIN — IPRATROPIUM BROMIDE 0.5 MG: 0.5 SOLUTION RESPIRATORY (INHALATION) at 20:05

## 2024-01-01 RX ADMIN — FAMOTIDINE 10 MG: 20 TABLET, FILM COATED ORAL at 21:22

## 2024-01-01 RX ADMIN — SERTRALINE HYDROCHLORIDE 50 MG: 50 TABLET ORAL at 09:04

## 2024-01-01 RX ADMIN — Medication 1000 UNITS: at 09:20

## 2024-01-01 RX ADMIN — LIDOCAINE 5% 2 PATCH: 700 PATCH TOPICAL at 21:09

## 2024-01-01 RX ADMIN — BUDESONIDE INHALATION 0.5 MG: 0.5 SUSPENSION RESPIRATORY (INHALATION) at 07:08

## 2024-01-01 RX ADMIN — IPRATROPIUM BROMIDE 0.5 MG: 0.5 SOLUTION RESPIRATORY (INHALATION) at 20:57

## 2024-01-01 RX ADMIN — PANTOPRAZOLE SODIUM 20 MG: 20 TABLET, DELAYED RELEASE ORAL at 06:27

## 2024-01-01 RX ADMIN — GABAPENTIN 100 MG: 100 CAPSULE ORAL at 21:08

## 2024-01-01 RX ADMIN — HEPARIN SODIUM 5000 UNITS: 5000 INJECTION, SOLUTION INTRAVENOUS; SUBCUTANEOUS at 17:33

## 2024-01-01 RX ADMIN — MIDODRINE HYDROCHLORIDE 5 MG: 5 TABLET ORAL at 08:19

## 2024-01-01 RX ADMIN — SODIUM CHLORIDE 75 ML/HR: 0.9 INJECTION, SOLUTION INTRAVENOUS at 17:34

## 2024-01-01 RX ADMIN — MIDODRINE HYDROCHLORIDE 5 MG: 5 TABLET ORAL at 16:28

## 2024-01-01 RX ADMIN — POTASSIUM CHLORIDE 40 MEQ: 1500 TABLET, EXTENDED RELEASE ORAL at 09:05

## 2024-01-01 RX ADMIN — FORMOTEROL FUMARATE DIHYDRATE 20 MCG: 20 SOLUTION RESPIRATORY (INHALATION) at 07:33

## 2024-01-01 RX ADMIN — FAMOTIDINE 10 MG: 20 TABLET, FILM COATED ORAL at 22:12

## 2024-01-01 RX ADMIN — FLUTICASONE PROPIONATE 1 SPRAY: 50 SPRAY, METERED NASAL at 09:45

## 2024-01-01 RX ADMIN — FUROSEMIDE 40 MG: 10 INJECTION, SOLUTION INTRAMUSCULAR; INTRAVENOUS at 08:54

## 2024-01-02 ENCOUNTER — PATIENT MESSAGE (OUTPATIENT)
Dept: FAMILY MEDICINE CLINIC | Facility: CLINIC | Age: 79
End: 2024-01-02

## 2024-01-02 ENCOUNTER — HOME CARE VISIT (OUTPATIENT)
Dept: HOME HEALTH SERVICES | Facility: HOME HEALTHCARE | Age: 79
End: 2024-01-02
Payer: COMMERCIAL

## 2024-01-02 ENCOUNTER — PATIENT OUTREACH (OUTPATIENT)
Dept: CASE MANAGEMENT | Facility: OTHER | Age: 79
End: 2024-01-02

## 2024-01-02 VITALS
TEMPERATURE: 97.2 F | OXYGEN SATURATION: 95 % | DIASTOLIC BLOOD PRESSURE: 60 MMHG | SYSTOLIC BLOOD PRESSURE: 100 MMHG | HEART RATE: 96 BPM | RESPIRATION RATE: 22 BRPM

## 2024-01-02 DIAGNOSIS — B37.0 ORAL CANDIDIASIS: ICD-10-CM

## 2024-01-02 PROCEDURE — G0299 HHS/HOSPICE OF RN EA 15 MIN: HCPCS

## 2024-01-02 NOTE — PROGRESS NOTES
OP RT CM received incoming in basket ADT alert.  Patient went to ED, discharged to home now. I will outreach patient shortly.   ________________________________  OP RT CM called and spoke with daughter Nedra.  Shlomo is hard of hearing and Nedra stated he would not be able to hear me on the phone. She takes calls for her dad.   She stated her dad performed lab work and MD office encouraged him to go to ED.   Nedra stated that her dad accidentally took her mom's medications. We discussed medications and she stated that her mom and dad have separate containers with dates marked.   Nedra stated her dad's breathing is good, aside from increased SOB when ambulating.  He wears the O2 ATC on 3lpm nasal cannula.     Shlomo has a home health appointment today. And cardiologist on Thursday.  I will outreach inn three weeks and family has my contact information.  Nedra appreciative of outreach.

## 2024-01-04 ENCOUNTER — OFFICE VISIT (OUTPATIENT)
Dept: CARDIOLOGY CLINIC | Facility: CLINIC | Age: 79
End: 2024-01-04
Payer: COMMERCIAL

## 2024-01-04 VITALS
HEART RATE: 90 BPM | SYSTOLIC BLOOD PRESSURE: 100 MMHG | HEIGHT: 68 IN | WEIGHT: 171 LBS | DIASTOLIC BLOOD PRESSURE: 70 MMHG | BODY MASS INDEX: 25.91 KG/M2

## 2024-01-04 DIAGNOSIS — I10 PRIMARY HYPERTENSION: ICD-10-CM

## 2024-01-04 DIAGNOSIS — J44.9 CHRONIC OBSTRUCTIVE PULMONARY DISEASE, UNSPECIFIED COPD TYPE (HCC): ICD-10-CM

## 2024-01-04 DIAGNOSIS — N18.31 CHRONIC KIDNEY DISEASE, STAGE 3A (HCC): ICD-10-CM

## 2024-01-04 DIAGNOSIS — I50.43 ACUTE ON CHRONIC COMBINED SYSTOLIC (CONGESTIVE) AND DIASTOLIC (CONGESTIVE) HEART FAILURE (HCC): ICD-10-CM

## 2024-01-04 DIAGNOSIS — I49.3 PVC (PREMATURE VENTRICULAR CONTRACTION): ICD-10-CM

## 2024-01-04 DIAGNOSIS — E87.6 HYPOKALEMIA: ICD-10-CM

## 2024-01-04 DIAGNOSIS — I50.20 HFREF (HEART FAILURE WITH REDUCED EJECTION FRACTION) (HCC): Primary | ICD-10-CM

## 2024-01-04 PROCEDURE — 99214 OFFICE O/P EST MOD 30 MIN: CPT | Performed by: INTERNAL MEDICINE

## 2024-01-04 RX ORDER — POTASSIUM CHLORIDE 20MEQ/15ML
20 LIQUID (ML) ORAL DAILY
Qty: 1500 ML | Refills: 2 | Status: SHIPPED | OUTPATIENT
Start: 2024-01-04

## 2024-01-04 NOTE — PROGRESS NOTES
Cardiology Follow Up  Seun Alva  [id number]  1945  [facility] [address] [address]      1. HFrEF (heart failure with reduced ejection fraction) ([facility])  potassium chloride 10% oral solution      2. Hypokalemia  potassium chloride 10% oral solution      3. Primary hypertension        4. PVC (premature ventricular contraction)        5. Chronic obstructive pulmonary disease, unspecified COPD type ([facility])        6. Acute on chronic combined systolic (congestive) and diastolic (congestive) heart failure (HCC)        7. Chronic kidney disease, stage 3a ([facility])            Discussion/Summary:  1.  Heart failure with reduced ejection fraction  2.  Coronary artery disease with history of PCI with nonobstructive disease on catheterization from 2021  3.  Hypertension however issues with hypotension on multiple medications  4.  Hypokalemia  5.  Hyperlipidemia  6.  Asymptomatic PVCs  7.  COPD on chronic 3-4 L nasal cannula oxygen    -Transthoracic echocardiogram 8/18/2023 showing left-ventricular systolic function moderately reduced estimated LVEF 35% with mildly dilated left atrium, mildly dilated right ventricle with mildly reduced systolic function with aortic valve sclerosis, mild mitral regurgitation and mild tricuspid regurgitation with estimated RVSP 46 mmHg.  -Will follow-up repeat transthoracic echocardiogram results.  -Can continue aspirin 81 mg daily, Jardiance 10 mg daily, furosemide 20 mg daily, metoprolol succinate 12.5 mg twice daily, Crestor 40 mg daily  -Patient having difficulty swallowing potassium 20 mEq tablets and requiring them to crush  -I will transition him to potassium 20 mEq daily and liquid form and we will follow-up laboratory studies which are pending  -Patient will also increase oral potassium intake and will monitor home blood  pressure readings which have been in the 90s-100s mmHg systolic range limiting up titration of medical therapy  -Patient also counseled on dietary and lifestyle modifications including sodium and fluid restricted diets and will be returning to cardiac rehab to see if he can get stronger  -Patient will follow with his pulmonologist for COPD and we will monitor renal function on laboratory studies.  -I will see patient in 3 months or sooner if necessary  -Will continue goals of care discussion between patient and family and medical team to assist to see if we can assist patient and his overall goals of care.  -Patient and family counseled if he were to have any warning or alarm type symptoms he is to seek emergency medical care immediately.        History of Present Illness:  -Patient is a 78-year-old male with hypertension, hyperlipidemia, significant weight loss and chronic medical conditions along with COPD on chronic nasal cannula oxygen now 3-4 L at baseline along with obstructive sleep apnea using CPAP therapy at home, history of coronary artery disease with prior MI in 1995 with PCI to RCA, stenting in 1999 in 2000 involving the RCA then again in 2003 with stenting to LAD with cardiac catheterization in March 2021 showing nonobstructive CAD with no need for revascularization at that time along with asymptomatic PVCs hospitalized multiple times over the past year for varying issues between COPD exacerbations, COVID-19, volume overload along with pneumonias and pneumonitis requiring IV steroids, diuretic regimen and was even seen and evaluated by advanced heart failure team for titration of medical therapy which has been very difficult as patient's blood pressures do not tolerate medications.  -He presents to the office today with family and notes that overall he is doing reasonably well.  He has some dependent lower extremity edema but denies any acute shortness of breath or orthopnea and states that overall he  seems to be about 85% of his baseline which is improved from his hospitalization in December 2023 with COVID-19.    Patient Active Problem List   Diagnosis    Hyperlipidemia    Coronary artery disease involving native coronary artery of native heart without angina pectoris    Left carotid artery occlusion    COPD with acute exacerbation ([facility])    Oxygen dependent    Depression, recurrent ([facility])    S/P carotid endarterectomy    Stented coronary artery    Vertigo    Anisometropia    Osteoarthritis of spinal facet joint    Chronic ischemic heart disease    Diverticular disease of colon    Dry eye syndrome    GERD (gastroesophageal reflux disease)    Acute hearing loss, right    Elevated PSA    Hypoxia    Lens replaced by other means    Lumbar radiculopathy    Multinodular goiter    Nuclear senile cataract    Obesity    Occlusion and stenosis of carotid artery    Osteoarthritis of hip    Prediabetes    LAMBERTO on CPAP    Solitary pulmonary nodule    Thyroid nodule    Guyon syndrome, left    Costochondral chest pain    Abnormal nuclear stress test    Right hip pain    Primary insomnia    Chronic right-sided thoracic back pain    Hoarseness or changing voice    Chronic bilateral low back pain with right-sided sciatica    Mid back pain    Thoracic radiculopathy    Chronic pain syndrome    Urinary frequency    Incomplete bladder emptying    Intercostal neuralgia    Cubital tunnel syndrome on left    Carpal tunnel syndrome on left    Elevated MCV    Prostate cancer ([facility])    Chronic respiratory failure with hypoxia ([facility])    Sensorineural hearing loss, bilateral    COVID-19    Ambulatory dysfunction    COPD (chronic obstructive pulmonary disease) ([facility])    Hypokalemia    Left leg pain    Transient right leg weakness    Leukocytosis    Orthopnea    Lower extremity edema    Irregular heart rhythm    Elevated troponin    Intracranial aneurysm    Congestive heart failure with left ventricular systolic dysfunction (LVSD) ([facility])     Hypoxemia    Dysphagia    Erectile dysfunction    Allergic rhinitis, unspecified    Chronic kidney disease, stage 3a (HCC)    Generalized muscle weakness    Hypertensive heart and chronic kidney disease with heart failure and stage 1 through stage 4 chronic kidney disease, or unspecified chronic kidney disease (HCC)    Need for assistance with personal care    Other abnormalities of gait and mobility    Retention of urine, unspecified    Sudden idiopathic hearing loss, right ear    Acute and chronic respiratory failure with hypoxia ([facility])    Other disorders of refraction    Occlusion and stenosis of unspecified carotid artery    Chronic systolic congestive heart failure ([facility])    Left eye pain    Intermittent constipation    Acute on chronic combined systolic (congestive) and diastolic (congestive) heart failure ([facility])     Past Medical History:   Diagnosis Date    Abnormal ECG 2021 OR 2022    Alcoholism ([facility]) 1984    sober 38 years    Arthritis 2008    beginning    Bilateral carotid artery stenosis     Callus     Cancer ([facility])     skin    Chronic diastolic heart failure ([facility])     Chronic ischemic heart disease     Chronic kidney disease     stage 3    Colon polyp     COPD (chronic obstructive pulmonary disease) ([facility])     Coronary artery disease     hx stents, MI, PCI    COVID 11/2021    COVID 12/01/2023    CPAP (continuous positive airway pressure) dependence     Disease of thyroid gland 2017    nodules    Emphysema of lung ([facility]) 1995    started    Hearing loss     History of transfusion 1995    Hyperlipidemia     Hypertension     Intracranial aneurysm 04/25/2023    Lung nodule 2023    x rays    MI (myocardial infarction) ([facility]) 1995    Myocardial infarction ([facility]) 1995    Pneumonia     Pneumothorax 02/20/2023    collapsed lung    Prostate cancer ([facility])     RSV (respiratory syncytial virus infection) 10/2022    S/P carotid endarterectomy     Shortness of breath     O2 2 l/nc PRN    Sleep apnea     Sleep apnea, obstructive      Stented coronary artery      Social History     Socioeconomic History    Marital status: /Civil Union     Spouse name: Not on file    Number of children: Not on file    Years of education: Not on file    Highest education level: Not on file   Occupational History    Not on file   Tobacco Use    Smoking status: Former     Current packs/day: 0.00     Average packs/day: 2.0 packs/day for 35.5 years (71.0 ttl pk-yrs)     Types: Cigarettes     Start date: 1960     Quit date: 1995     Years since quittin.5    Smokeless tobacco: Never    Tobacco comments:     stopped smoking the day i had a heart attack   Vaping Use    Vaping status: Never Used   Substance and Sexual Activity    Alcohol use: Not Currently    Drug use: Never    Sexual activity: Not Currently     Partners: Female     Birth control/protection: None   Other Topics Concern    Not on file   Social History Narrative    Not on file     Social Determinants of Health     Financial Resource Strain: Patient Declined (2023)    Overall Financial Resource Strain (CARDIA)     Difficulty of Paying Living Expenses: Patient declined   Food Insecurity: No Food Insecurity (10/19/2023)    Hunger Vital Sign     Worried About Running Out of Food in the Last Year: Never true     Ran Out of Food in the Last Year: Never true   Transportation Needs: No Transportation Needs (10/19/2023)    PRAPARE - Transportation     Lack of Transportation (Medical): No     Lack of Transportation (Non-Medical): No   Physical Activity: Not on file   Stress: Not on file   Social Connections: Not on file   Intimate Partner Violence: Not At Risk (2021)    Received from [facility]    Interpersonal Safety     Social Determinants: Intimate Partner Violence Past Fear: Not on file     Social Determinants: Intimate Partner Violence Current Fear: Not on file   Housing Stability: Low Risk  (10/19/2023)    Housing Stability Vital Sign     Unable to Pay for Housing in  the Last Year: No     Number of Places Lived in the Last Year: 1     Unstable Housing in the Last Year: No      Family History   Problem Relation Age of Onset    Heart attack Mother     Dementia Mother     Heart failure Mother          2006    Heart disease Mother         heart attacks (2)    No Known Problems Father      Past Surgical History:   Procedure Laterality Date    APPENDECTOMY      CARDIAC CATHETERIZATION  2021    left main with no significant disease, proximal LAD with 10% stenosis at the site of prior stent, left circumflex artery with minimal luminal irregularities mid RCA with 50% stenosis at site of prior stent and PL segment with distal disease supplied by collaterals from the distal circumflex with no significant CAD requiring revascularization at that time.    CATARACT EXTRACTION, BILATERAL Bilateral     COLONOSCOPY      CORONARY ANGIOPLASTY WITH STENT PLACEMENT      RCA    CORONARY ANGIOPLASTY WITH STENT PLACEMENT      RCA    CORONARY ANGIOPLASTY WITH STENT PLACEMENT      LAD    EYE SURGERY      HI BX PROSTATE STRTCTC SATURATION SAMPLING IMG GID N/A 2022    Procedure: TRANSPERINEAL MRI FUSION  BIOPSY PROSTATE;  Surgeon: Mele Patel MD;  Location: [facility];  Service: Urology    HI NEUROPLASTY &/TRANSPOS MEDIAN NRV CARPAL TUNNE Left 2022    Procedure: RELEASE CARPAL TUNNEL;  Surgeon: Matty Mcgowan MD;  Location: [facility];  Service: Orthopedics    HI NEUROPLASTY &/TRANSPOSITION ULNAR NERVE ELBOW Left 2022    Procedure: RELEASE CUBITAL TUNNEL;  Surgeon: Matty Mcgowan MD;  Location: [facility];  Service: Orthopedics    HI NEUROPLASTY &/TRANSPOSITION ULNAR NERVE WRIST Left 2022    Procedure: GUYON'S CANAL RELEASE;  Surgeon: Matty Mcgowan MD;  Location: [facility];  Service: Orthopedics    SKIN CANCER EXCISION      chin-per pt, basal cell  also right ankle    TONSILLECTOMY  no       Current Outpatient Medications:     ALPRAZolam  (XANAX) 0.25 mg tablet, Take 1 tablet (0.25 mg total) by mouth daily at bedtime as needed for anxiety, Disp: 15 tablet, Rfl: 0    aspirin 81 mg chewable tablet, 81 mg daily at bedtime, Disp: , Rfl:     budesonide (PULMICORT) 0.5 mg/2 mL nebulizer solution, Take 2 mL (0.5 mg total) by nebulization 2 (two) times a day Rinse mouth after use., Disp: 180 mL, Rfl: 3    cholecalciferol (VITAMIN D3) 1,000 units tablet, Take 1,000 Units by mouth daily, Disp: , Rfl:     dextromethorphan-guaiFENesin (ROBITUSSIN DM)  mg/5 mL syrup, Take 5 mL by mouth 3 (three) times a day as needed for cough, Disp: 354 mL, Rfl: 0    Empagliflozin (Jardiance) 10 MG TABS tablet, Take 1 tablet (10 mg total) by mouth every morning, Disp: 90 tablet, Rfl: 5    famotidine (PEPCID) 20 mg tablet, TAKE 1 TABLET BY MOUTH DAILY AT  BEDTIME, Disp: 100 tablet, Rfl: 2    fluticasone (FLONASE) 50 mcg/act nasal spray, 1 spray into each nostril 2 (two) times a day, Disp: 9.9 mL, Rfl: 0    formoterol (PERFOROMIST) 20 MCG/2ML nebulizer solution, Take 2 mL (20 mcg total) by nebulization 2 (two) times a day, Disp: 180 mL, Rfl: 0    furosemide (LASIX) 40 mg tablet, Take 0.5 tablets (20 mg total) by mouth daily, Disp: 90 tablet, Rfl: 2    gabapentin (NEURONTIN) 300 mg capsule, Take 1 capsule (300 mg total) by mouth daily at bedtime, Disp: 90 capsule, Rfl: 0    guaiFENesin (MUCINEX) 600 mg 12 hr tablet, Take 1 tablet (600 mg total) by mouth 2 (two) times a day, Disp: 60 tablet, Rfl: 0    Humidifier MISC, Use continuous, Disp: 1 each, Rfl: 0    HYDROcodone-acetaminophen (Norco) 5-325 mg per tablet, Take 1 tablet by mouth every 6 (six) hours as needed for pain Max Daily Amount: 4 tablets, Disp: 15 tablet, Rfl: 0    ipratropium-albuterol (DUO-NEB) 0.5-2.5 mg/3 mL nebulizer solution, , Disp: , Rfl:     methocarbamol (ROBAXIN) 500 mg tablet, Take 1 tablet (500 mg total) by mouth 3 (three) times a day as needed for muscle spasms, Disp: 30 tablet, Rfl: 0    metoprolol  succinate (TOPROL-XL) 25 mg 24 hr tablet, Take 0.5 tablets (12.5 mg total) by mouth 2 (two) times a day, Disp: 30 tablet, Rfl: 0    naloxone (NARCAN) 4 mg/0.1 mL nasal spray, Administer 1 spray into a nostril. If no response after 2-3 minutes, give another dose in the other nostril using a new spray., Disp: 1 each, Rfl: 1    nystatin (MYCOSTATIN) 500,000 units/5 mL suspension, 5 ml orally (swish and spit/swallow) every four hours while awake, Disp: 180 mL, Rfl: 0    omeprazole (PriLOSEC) 20 mg delayed release capsule, take 1 capsule by mouth once daily, Disp: 30 capsule, Rfl: 3    oxygen gas, Inhale 2 L/min continuous 2LPM at rest and 3-4 LPM with activity per D Cedric FANG notes, Disp: , Rfl:     potassium chloride 10% oral solution, Take 15 mL (20 mEq total) by mouth daily, Disp: 1500 mL, Rfl: 2    rosuvastatin (CRESTOR) 40 MG tablet, TAKE 1 TABLET DAILY (Patient taking differently: Take 40 mg by mouth daily), Disp: 100 tablet, Rfl: 3    sodium chloride (OCEAN) 0.65 % nasal spray, 1 spray into each nostril every hour as needed for congestion, Disp: 30 mL, Rfl: 0  Allergies   Allergen Reactions    Lisinopril Swelling and Cough    Tetanus Antitoxin Anaphylaxis    Tetanus Toxoid Anaphylaxis and Swelling         Labs:  Admission on 12/29/2023, Discharged on 12/29/2023   Component Date Value    WBC 12/29/2023 7.11     RBC 12/29/2023 4.11     Hemoglobin 12/29/2023 13.2     Hematocrit 12/29/2023 41.7     MCV 12/29/2023 102 (H)     MCH 12/29/2023 32.1     MCHC 12/29/2023 31.7     RDW 12/29/2023 16.7 (H)     MPV 12/29/2023 10.6     Platelets 12/29/2023 231     nRBC 12/29/2023 0     Neutrophils Relative 12/29/2023 87 (H)     Immat GRANS % 12/29/2023 0     Lymphocytes Relative 12/29/2023 4 (L)     Monocytes Relative 12/29/2023 9     Eosinophils Relative 12/29/2023 0     Basophils Relative 12/29/2023 0     Neutrophils Absolute 12/29/2023 6.17     Immature Grans Absolute 12/29/2023 0.02     Lymphocytes Absolute 12/29/2023 0.30  (L)     Monocytes Absolute 12/29/2023 0.62     Eosinophils Absolute 12/29/2023 0.00     Basophils Absolute 12/29/2023 0.00     Sodium 12/29/2023 141     Potassium 12/29/2023 3.1 (L)     Chloride 12/29/2023 98     CO2 12/29/2023 33 (H)     ANION GAP 12/29/2023 10     BUN 12/29/2023 20     Creatinine 12/29/2023 1.22     Glucose 12/29/2023 124     Calcium 12/29/2023 9.2     eGFR 12/29/2023 56     Magnesium 12/29/2023 1.8 (L)     Ventricular Rate 12/29/2023 106     Atrial Rate 12/29/2023 100     CA Interval 12/29/2023 160     QRSD Interval 12/29/2023 98     QT Interval 12/29/2023 376     QTC Interval 12/29/2023 499     P Axis 12/29/2023 70     QRS [place] 12/29/2023 77     T Wave [place] 12/29/2023 269    Appointment on 12/29/2023   Component Date Value    WBC 12/29/2023 8.52     RBC 12/29/2023 4.11     Hemoglobin 12/29/2023 13.1     Hematocrit 12/29/2023 41.8     MCV 12/29/2023 102 (H)     MCH 12/29/2023 31.9     MCHC 12/29/2023 31.3 (L)     RDW 12/29/2023 16.6 (H)     MPV 12/29/2023 10.9     Platelets 12/29/2023 230     nRBC 12/29/2023 0     Neutrophils Relative 12/29/2023 88 (H)     Immat GRANS % 12/29/2023 0     Lymphocytes Relative 12/29/2023 3 (L)     Monocytes Relative 12/29/2023 9     Eosinophils Relative 12/29/2023 0     Basophils Relative 12/29/2023 0     Neutrophils Absolute 12/29/2023 7.41     Immature Grans Absolute 12/29/2023 0.03     Lymphocytes Absolute 12/29/2023 0.26 (L)     Monocytes Absolute 12/29/2023 0.79     Eosinophils Absolute 12/29/2023 0.02     Basophils Absolute 12/29/2023 0.01     Sodium 12/29/2023 142     Potassium 12/29/2023 2.9 (L)     Chloride 12/29/2023 97     CO2 12/29/2023 33 (H)     ANION GAP 12/29/2023 12     BUN 12/29/2023 19     Creatinine 12/29/2023 1.16     Glucose, Fasting 12/29/2023 102 (H)     Calcium 12/29/2023 9.0     AST 12/29/2023 27     ALT 12/29/2023 24     Alkaline Phosphatase 12/29/2023 59     Total Protein 12/29/2023 6.2 (L)     Albumin 12/29/2023 4.0     Total Bilirubin  "12/29/2023 0.64     eGFR 12/29/2023 59     Vitamin B-12 12/29/2023 696    No results displayed because visit has over 200 results.           Imaging: No results found.      Review of Systems:  Review of Systems   Constitutional:  Negative for chills, diaphoresis, fatigue and fever.   HENT:  Negative for trouble swallowing and voice change.    Eyes:  Negative for pain and redness.   Respiratory:  Positive for shortness of breath. Negative for wheezing.    Cardiovascular:  Positive for leg swelling (but stable). Negative for chest pain and palpitations.   Gastrointestinal:  Negative for abdominal pain, blood in stool, constipation, diarrhea, nausea and vomiting.   Genitourinary:  Negative for dysuria.   Musculoskeletal:  Positive for arthralgias. Negative for neck pain and neck stiffness.   Skin:  Negative for rash.   Neurological:  Negative for dizziness, syncope, light-headedness and headaches.   Psychiatric/Behavioral:  Negative for agitation and confusion.    All other systems reviewed and are negative.        Vitals:    01/04/24 1404   BP: 100/70   Pulse: 90   Weight: 77.6 kg (171 lb)   Height: 5' 8\" (1.727 m)     Vitals:    01/04/24 1404   Weight: 77.6 kg (171 lb)     Height: 5' 8\" (172.7 cm)     Physical Exam:   Physical Exam  Vitals reviewed.   Constitutional:       General: He is not in acute distress.     Appearance: He is not diaphoretic.   HENT:      Head: Normocephalic and atraumatic.      Comments: Nasal cannula oxygen in place  Eyes:      General:         Right eye: No discharge.         Left eye: No discharge.   Neck:      Comments: Trachea midline, no JVD present  Cardiovascular:      Rate and Rhythm: Normal rate and regular rhythm.      Heart sounds:      No friction rub.   Pulmonary:      Effort: No respiratory distress.      Breath sounds: Wheezing present.      Comments: Decreased breath sounds bilaterally  Chest:      Chest wall: No tenderness.   Abdominal:      General: Bowel sounds are normal. "      Palpations: Abdomen is soft.      Tenderness: There is no abdominal tenderness. There is no rebound.   Musculoskeletal:      Right lower leg: Edema (1-2+) present.      Left lower leg: Edema (1-2+) present.   Skin:     General: Skin is warm and dry.   Neurological:      Mental Status: He is alert.      Comments: Awake, alert, able to answer questions appropriately, very hard of hearing.   Psychiatric:         Mood and Affect: Mood normal.         Behavior: Behavior normal.

## 2024-01-05 ENCOUNTER — APPOINTMENT (OUTPATIENT)
Dept: LAB | Facility: HOSPITAL | Age: 79
End: 2024-01-05
Payer: COMMERCIAL

## 2024-01-05 DIAGNOSIS — I50.20 CONGESTIVE HEART FAILURE WITH LEFT VENTRICULAR SYSTOLIC DYSFUNCTION (LVSD) (HCC): ICD-10-CM

## 2024-01-05 LAB
ANION GAP SERPL CALCULATED.3IONS-SCNC: 9 MMOL/L
BUN SERPL-MCNC: 22 MG/DL (ref 5–25)
CALCIUM SERPL-MCNC: 9.2 MG/DL (ref 8.4–10.2)
CHLORIDE SERPL-SCNC: 99 MMOL/L (ref 96–108)
CO2 SERPL-SCNC: 35 MMOL/L (ref 21–32)
CREAT SERPL-MCNC: 1.29 MG/DL (ref 0.6–1.3)
GFR SERPL CREATININE-BSD FRML MDRD: 52 ML/MIN/1.73SQ M
GLUCOSE P FAST SERPL-MCNC: 94 MG/DL (ref 65–99)
MAGNESIUM SERPL-MCNC: 2.1 MG/DL (ref 1.9–2.7)
POTASSIUM SERPL-SCNC: 3.4 MMOL/L (ref 3.5–5.3)
PSA SERPL-MCNC: 0.03 NG/ML (ref 0–4)
SODIUM SERPL-SCNC: 143 MMOL/L (ref 135–147)

## 2024-01-05 PROCEDURE — 80048 BASIC METABOLIC PNL TOTAL CA: CPT

## 2024-01-05 PROCEDURE — 83735 ASSAY OF MAGNESIUM: CPT

## 2024-01-08 ENCOUNTER — OFFICE VISIT (OUTPATIENT)
Dept: PULMONOLOGY | Facility: CLINIC | Age: 79
End: 2024-01-08
Payer: COMMERCIAL

## 2024-01-08 ENCOUNTER — PATIENT MESSAGE (OUTPATIENT)
Dept: FAMILY MEDICINE CLINIC | Facility: CLINIC | Age: 79
End: 2024-01-08

## 2024-01-08 VITALS
OXYGEN SATURATION: 93 % | BODY MASS INDEX: 26.07 KG/M2 | SYSTOLIC BLOOD PRESSURE: 106 MMHG | TEMPERATURE: 98 F | WEIGHT: 172 LBS | HEART RATE: 106 BPM | DIASTOLIC BLOOD PRESSURE: 70 MMHG | HEIGHT: 68 IN

## 2024-01-08 VITALS
TEMPERATURE: 97.3 F | DIASTOLIC BLOOD PRESSURE: 60 MMHG | HEART RATE: 108 BPM | SYSTOLIC BLOOD PRESSURE: 108 MMHG | OXYGEN SATURATION: 95 % | RESPIRATION RATE: 24 BRPM

## 2024-01-08 DIAGNOSIS — G47.33 OSA ON CPAP: ICD-10-CM

## 2024-01-08 DIAGNOSIS — I50.20 HFREF (HEART FAILURE WITH REDUCED EJECTION FRACTION) (HCC): ICD-10-CM

## 2024-01-08 DIAGNOSIS — E87.6 HYPOKALEMIA: Primary | ICD-10-CM

## 2024-01-08 DIAGNOSIS — J96.11 CHRONIC RESPIRATORY FAILURE WITH HYPOXIA (HCC): ICD-10-CM

## 2024-01-08 DIAGNOSIS — J44.9 COPD, SEVERE (HCC): Primary | ICD-10-CM

## 2024-01-08 DIAGNOSIS — J44.1 COPD EXACERBATION (HCC): ICD-10-CM

## 2024-01-08 PROCEDURE — 99214 OFFICE O/P EST MOD 30 MIN: CPT | Performed by: PHYSICIAN ASSISTANT

## 2024-01-08 RX ORDER — POTASSIUM CHLORIDE 20MEQ/15ML
40 LIQUID (ML) ORAL DAILY
Start: 2024-01-08

## 2024-01-08 NOTE — PROGRESS NOTES
Pulmonary Follow Up Note   Seun Alva 78 y.o. male MRN: [id number]  1/11/2024      Assessment:    COPD, severe (HCC)  Acute exacerbation on today's exam.  Would not recommend additional systemic steroids at this time.  Continue all nebulized regimen of Perforomist/Pulmicort twice daily and DuoNeb 4 times daily.  Not a candidate for endobronchial valves due to EF less than 45%. Continue informal increased activities, previously complete home PT. There is pulmonary rehab that was ordered for which he is considering going.     Chronic respiratory failure with hypoxia (HCC)  Continue 3 L nasal cannula continuously to maintain saturations greater than or equal to 88%.    LAMBERTO on CPAP  Continue CPAP during all hours of sleep.  No compliance data for me to review today.      Plan:    Diagnoses and all orders for this visit:    COPD, severe (HCC)    COPD exacerbation (HCC)  -     Ambulatory referral to Pulmonology    Chronic respiratory failure with hypoxia (HCC)    LAMBERTO on CPAP        Return in about 3 months (around 4/8/2024).    History of Present Illness   HPI:  Seun Alva is a 78 y.o. male who presents the office today for routine follow-up.  Patient has a history of severe COPD, chronic hypoxic respiratory failure, CHF, LAMBERTO on CPAP, CKD.  She was hospitalized in October for CHF/COPD exacerbation but recovered well.  End of December he reported increased cough and congestion with wheezing was treated with Z-Keanu and prednisone.  Currently patient states that his respiratory symptoms are at baseline.  He still struggles with dyspnea on exertion but denies any mucopurulent sputum production or active wheezing.  He uses supplemental oxygen 3 L nasal cannula during waking hours and reports compliance on CPAP.  reports compliance on Perforomist, Pulmicort and DuoNeb 3-4 times a day as needed.    Review of Systems   Constitutional:  Positive for appetite change. Negative for fever.   HENT:  Positive for rhinorrhea  and sneezing. Negative for ear pain, postnasal drip and trouble swallowing.    Respiratory:  Positive for shortness of breath.    Cardiovascular:  Negative for chest pain.   Musculoskeletal:  Negative for myalgias.   Neurological:  Negative for headaches.   All other systems reviewed and are negative.      Historical Information   Past Medical History:   Diagnosis Date    Abnormal ECG 2021 OR 2022    Alcoholism ([facility]) 1984    sober 38 years    Arthritis 2008    beginning    Bilateral carotid artery stenosis     Callus     Cancer ([facility])     skin    Chronic diastolic heart failure ([facility])     Chronic ischemic heart disease     Chronic kidney disease     stage 3    Colon polyp     COPD (chronic obstructive pulmonary disease) ([facility])     Coronary artery disease     hx stents, MI, PCI    COVID 11/2021    COVID 12/01/2023    CPAP (continuous positive airway pressure) dependence     Disease of thyroid gland 2017    nodules    Emphysema of lung ([facility]) 1995    started    Hearing loss     History of transfusion 1995    Hyperlipidemia     Hypertension     Intracranial aneurysm 04/25/2023    Lung nodule 2023    x rays    MI (myocardial infarction) ([facility]) 1995    Myocardial infarction ([facility]) 1995    Pneumonia     Pneumothorax 02/20/2023    collapsed lung    Prostate cancer ([facility])     RSV (respiratory syncytial virus infection) 10/2022    S/P carotid endarterectomy     Shortness of breath     O2 2 l/nc PRN    Sleep apnea     Sleep apnea, obstructive     Stented coronary artery      Past Surgical History:   Procedure Laterality Date    APPENDECTOMY      CARDIAC CATHETERIZATION  03/18/2021    left main with no significant disease, proximal LAD with 10% stenosis at the site of prior stent, left circumflex artery with minimal luminal irregularities mid RCA with 50% stenosis at site of prior stent and PL segment with distal disease supplied by collaterals from the distal circumflex with no significant CAD requiring revascularization at that  time.    CATARACT EXTRACTION, BILATERAL Bilateral     COLONOSCOPY      CORONARY ANGIOPLASTY WITH STENT PLACEMENT      RCA    CORONARY ANGIOPLASTY WITH STENT PLACEMENT      RCA    CORONARY ANGIOPLASTY WITH STENT PLACEMENT      LAD    EYE SURGERY      DC BX PROSTATE STRTCTC SATURATION SAMPLING IMG GID N/A 2022    Procedure: TRANSPERINEAL MRI FUSION  BIOPSY PROSTATE;  Surgeon: Mele Patel MD;  Location: [facility];  Service: Urology    DC NEUROPLASTY &/TRANSPOS MEDIAN NRV CARPAL TUNNE Left 2022    Procedure: RELEASE CARPAL TUNNEL;  Surgeon: Matty Mcgowan MD;  Location: [facility];  Service: Orthopedics    DC NEUROPLASTY &/TRANSPOSITION ULNAR NERVE ELBOW Left 2022    Procedure: RELEASE CUBITAL TUNNEL;  Surgeon: Matty Mcgowan MD;  Location: [facility];  Service: Orthopedics    DC NEUROPLASTY &/TRANSPOSITION ULNAR NERVE WRIST Left 2022    Procedure: GUYON'S CANAL RELEASE;  Surgeon: Matty Mcgowan MD;  Location: [facility];  Service: Orthopedics    SKIN CANCER EXCISION      chin-per pt, basal cell  also right ankle    TONSILLECTOMY  no     Family History   Problem Relation Age of Onset    Heart attack Mother     Dementia Mother     Heart failure Mother          2006    Heart disease Mother         heart attacks (2)    No Known Problems Father        Social History     Tobacco Use   Smoking Status Former    Current packs/day: 0.00    Average packs/day: 2.0 packs/day for 35.5 years (71.0 ttl pk-yrs)    Types: Cigarettes    Start date: 1960    Quit date: 1995    Years since quittin.5   Smokeless Tobacco Never   Tobacco Comments    stopped smoking the day i had a heart attack         Meds/Allergies     Current Outpatient Medications:     ALPRAZolam (XANAX) 0.25 mg tablet, Take 1 tablet (0.25 mg total) by mouth daily at bedtime as needed for anxiety, Disp: 15 tablet, Rfl: 0    aspirin 81 mg chewable tablet, 81 mg daily at bedtime, Disp: , Rfl:      budesonide (PULMICORT) 0.5 mg/2 mL nebulizer solution, Take 2 mL (0.5 mg total) by nebulization 2 (two) times a day Rinse mouth after use., Disp: 180 mL, Rfl: 3    cholecalciferol (VITAMIN D3) 1,000 units tablet, Take 1,000 Units by mouth daily, Disp: , Rfl:     dextromethorphan-guaiFENesin (ROBITUSSIN DM)  mg/5 mL syrup, Take 5 mL by mouth 3 (three) times a day as needed for cough, Disp: 354 mL, Rfl: 0    Empagliflozin (Jardiance) 10 MG TABS tablet, Take 1 tablet (10 mg total) by mouth every morning, Disp: 90 tablet, Rfl: 5    famotidine (PEPCID) 20 mg tablet, TAKE 1 TABLET BY MOUTH DAILY AT  BEDTIME, Disp: 100 tablet, Rfl: 2    fluticasone (FLONASE) 50 mcg/act nasal spray, 1 spray into each nostril 2 (two) times a day, Disp: 9.9 mL, Rfl: 0    formoterol (PERFOROMIST) 20 MCG/2ML nebulizer solution, Take 2 mL (20 mcg total) by nebulization 2 (two) times a day, Disp: 180 mL, Rfl: 0    furosemide (LASIX) 40 mg tablet, Take 0.5 tablets (20 mg total) by mouth daily, Disp: 90 tablet, Rfl: 2    gabapentin (NEURONTIN) 300 mg capsule, Take 1 capsule (300 mg total) by mouth daily at bedtime, Disp: 90 capsule, Rfl: 0    Humidifier MISC, Use continuous, Disp: 1 each, Rfl: 0    HYDROcodone-acetaminophen (Norco) 5-325 mg per tablet, Take 1 tablet by mouth every 6 (six) hours as needed for pain Max Daily Amount: 4 tablets, Disp: 15 tablet, Rfl: 0    ipratropium-albuterol (DUO-NEB) 0.5-2.5 mg/3 mL nebulizer solution, , Disp: , Rfl:     methocarbamol (ROBAXIN) 500 mg tablet, Take 1 tablet (500 mg total) by mouth 3 (three) times a day as needed for muscle spasms, Disp: 30 tablet, Rfl: 0    metoprolol succinate (TOPROL-XL) 25 mg 24 hr tablet, Take 0.5 tablets (12.5 mg total) by mouth 2 (two) times a day, Disp: 30 tablet, Rfl: 0    naloxone (NARCAN) 4 mg/0.1 mL nasal spray, Administer 1 spray into a nostril. If no response after 2-3 minutes, give another dose in the other nostril using a new spray., Disp: 1 each, Rfl: 1     "nystatin (MYCOSTATIN) 500,000 units/5 mL suspension, 5 ml orally (swish and spit/swallow) every four hours while awake, Disp: 180 mL, Rfl: 0    omeprazole (PriLOSEC) 20 mg delayed release capsule, take 1 capsule by mouth once daily, Disp: 30 capsule, Rfl: 3    oxygen gas, Inhale 2 L/min continuous 2LPM at rest and 3-4 LPM with activity per D Cedric FANG notes, Disp: , Rfl:     potassium chloride 10% oral solution, Take 30 mL (40 mEq total) by mouth daily, Disp: , Rfl:     rosuvastatin (CRESTOR) 40 MG tablet, TAKE 1 TABLET DAILY (Patient taking differently: Take 40 mg by mouth daily), Disp: 100 tablet, Rfl: 3    sodium chloride (OCEAN) 0.65 % nasal spray, 1 spray into each nostril every hour as needed for congestion, Disp: 30 mL, Rfl: 0  No current facility-administered medications for this visit.  Allergies   Allergen Reactions    Lisinopril Swelling and Cough    Tetanus Antitoxin Anaphylaxis    Tetanus Toxoid Anaphylaxis and Swelling       Vitals: Blood pressure 106/70, pulse (!) 106, temperature 98 °F (36.7 °C), temperature source Tympanic, height 5' 8\" (1.727 m), weight 78 kg (172 lb), SpO2 93%. Body mass index is 26.15 kg/m². Oxygen Therapy  SpO2: 93 %  O2 Flow Rate (L/min): 4 L/min    Physical Exam  Physical Exam  Vitals reviewed.   Constitutional:       Appearance: Normal appearance. He is well-developed.   HENT:      Head: Normocephalic and atraumatic.      Nose: Nose normal.      Mouth/Throat:      Mouth: Mucous membranes are moist.   Eyes:      Extraocular Movements: Extraocular movements intact.   Cardiovascular:      Rate and Rhythm: Normal rate and regular rhythm.      Heart sounds: Normal heart sounds.   Pulmonary:      Effort: Pulmonary effort is normal. No respiratory distress.      Breath sounds: No wheezing, rhonchi or rales.   Musculoskeletal:         General: No swelling.   Skin:     General: Skin is warm and dry.   Neurological:      Mental Status: He is alert. Mental status is at baseline. " "  Psychiatric:         Mood and Affect: Mood normal.         Behavior: Behavior normal.         Labs: I have personally reviewed pertinent lab results., ABG: No results found for: \"PHART\", \"PDM7HYE\", \"PO2ART\", \"NSO1UCM\", \"Q4AWJSBI\", \"BEART\", \"SOURCE\", BNP: No results found for: \"BNP\", CBC: No results found for: \"WBC\", \"HGB\", \"HCT\", \"MCV\", \"PLT\", \"ADJUSTEDWBC\", \"RBC\", \"MCH\", \"MCHC\", \"RDW\", \"MPV\", \"NRBC\", CMP: No results found for: \"SODIUM\", \"K\", \"CL\", \"CO2\", \"ANIONGAP\", \"BUN\", \"CREATININE\", \"GLUCOSE\", \"CALCIUM\", \"AST\", \"ALT\", \"ALKPHOS\", \"PROT\", \"BILITOT\", \"EGFR\", PT/INR: No results found for: \"PT\", \"INR\", Troponin: No results found for: \"TROPONINI\"  Lab Results   Component Value Date    WBC 7.11 12/29/2023    HGB 13.2 12/29/2023    HCT 41.7 12/29/2023     (H) 12/29/2023     12/29/2023     Lab Results   Component Value Date    CALCIUM 9.2 01/05/2024    K 3.4 (L) 01/05/2024    CO2 35 (H) 01/05/2024    CL 99 01/05/2024    BUN 22 01/05/2024    CREATININE 1.29 01/05/2024     Lab Results   Component Value Date    IGE 88.1 08/29/2022     Lab Results   Component Value Date    ALT 24 12/29/2023    AST 27 12/29/2023    ALKPHOS 59 12/29/2023       Imaging and other studies: I have personally reviewed pertinent reports.   and I have personally reviewed pertinent films in PACS     CT chest without contrast 12/1/2023  Clear.  Stable bibasilar patchy opacity likely atelectasis with high density in keeping with calcification or aspirated contrast/barium.  Severe centrilobular emphysema.  Diffuse calcified granulomata.  No endobronchial or tracheal lesions.  Improved bilateral pleural effusions with small right and trace left effusions remaining.    Pulmonary function testing:  Performed 6/28/2023   FEV1/FVC ratio 44%   FEV1 38% predicted  FVC 66% predicted  no response to bronchodilators   % predicted   % predicted  DLCO corrected for hemoglobin 26 % predicted  Severe obstruction with reduced vital " capacity.  No change with bronchodilators.  Lung volumes suggest moderate to severe air trapping.  Severely impaired diffusion capacity.      Answers submitted by the patient for this visit:  Pulmonology Questionnaire (Submitted on 1/4/2024)  Chief Complaint: Primary symptoms  Do you have chest tightness?: Yes  Do you have difficulty breathing?: Yes  Chronicity: chronic  When did you first notice your symptoms?: more than 1 year ago  How often do your symptoms occur?: constantly  Since you first noticed this problem, how has it changed?: waxing and waning  Do you have shortness of breath that occurs with effort or exertion?: Yes  Do you have ear congestion?: Yes  Do you have heartburn?: No  Do you have fatigue?: Yes  Do you have nasal congestion?: Yes  Do you have shortness of breath when lying flat?: No  Do you have shortness of breath when you wake up?: Yes  Do you have sweats?: No  Have you experienced weight loss?: Yes  Which of the following makes your symptoms worse?: change in weather, climbing stairs, exposure to smoke, pollen, strenuous activity, URI  Which of the following makes your symptoms better?: change in position, rest  Risk factors for lung disease: animal exposure

## 2024-01-09 ENCOUNTER — HOME CARE VISIT (OUTPATIENT)
Dept: HOME HEALTH SERVICES | Facility: HOME HEALTHCARE | Age: 79
End: 2024-01-09
Payer: COMMERCIAL

## 2024-01-11 ENCOUNTER — CLINICAL SUPPORT (OUTPATIENT)
Dept: UROLOGY | Facility: CLINIC | Age: 79
End: 2024-01-11
Payer: COMMERCIAL

## 2024-01-11 VITALS
DIASTOLIC BLOOD PRESSURE: 58 MMHG | BODY MASS INDEX: 26.15 KG/M2 | OXYGEN SATURATION: 79 % | SYSTOLIC BLOOD PRESSURE: 118 MMHG | HEIGHT: 68 IN | HEART RATE: 107 BPM

## 2024-01-11 DIAGNOSIS — C61 PROSTATE CANCER (HCC): Primary | ICD-10-CM

## 2024-01-11 PROCEDURE — 99214 OFFICE O/P EST MOD 30 MIN: CPT | Performed by: PHYSICIAN ASSISTANT

## 2024-01-11 PROCEDURE — 96402 CHEMO HORMON ANTINEOPL SQ/IM: CPT

## 2024-01-11 NOTE — PROGRESS NOTES
1/11/2024      Chief Complaint   Patient presents with    Follow-up     Lupron    Prostate Cancer         Assessment and Plan    78 y.o. male managed by Dr Flores    1. Himanshu 4+3=7 prostate cancer  2. Chronic respiratory failure    Excellent PSA response to lupron. Lupron 6 month depot administered today 1/11/2024.    Will d/w his care team plans for continuous lupron (monotherapy) of if he may be candidate for intermittent lupron with psa's every six months regardless.      ADDENDUM d/w dr flores; pt received lupron today, psa in 6 months, trial intermittent therapy with psa threshold 2.0 for next injection    Lab Results   Component Value Date    PSA 0.03 01/05/2024    PSA 0.25 09/12/2023    PSA 5.53 (H) 08/09/2023         History of Present Illness  Seun Alva is a 78 y.o. male here for evaluation of Ravia 7 intermediate risk prostate cancer he has no surgical radiation plans at this time this decision is driven by his significant pulmonary comorbidity patient family and treatment team are all in agreement for androgen deprivation therapy as his sole treatment.  PSMA PET scan also confirms localized disease only with no metastases.  He voids okay.  He received an eligard in october 2022 and lupron july 2023 he is here today for his next shot.  PSA has responded beautifully from pretreatment 18 down to 0.03 on ADT alone. He was hospitalized for copd exacerbation few weeks ago and is back home recovering. his grandaughter accompanies him today.        Review of Systems   Constitutional: Negative.    Respiratory: Negative.     Cardiovascular: Negative.    Genitourinary:  Negative for decreased urine volume, difficulty urinating, dysuria, flank pain, frequency, hematuria and urgency.   Musculoskeletal: Negative.            AUA SYMPTOM SCORE      Flowsheet Row Most Recent Value   AUA SYMPTOM SCORE    How often have you had a sensation of not emptying your bladder completely after you finished  "urinating? 5 (P)    How often have you had to urinate again less than two hours after you finished urinating? 2 (P)    How often have you found you stopped and started again several times when you urinate? 1 (P)    How often have you found it difficult to postpone urination? 3 (P)    How often have you had a weak urinary stream? 0 (P)    How often have you had to push or strain to begin urination? 1 (P)    How many times did you most typically get up to urinate from the time you went to bed at night until the time you got up in the morning? 1 (P)    Quality of Life: If you were to spend the rest of your life with your urinary condition just the way it is now, how would you feel about that? 2 (P)    AUA SYMPTOM SCORE 13 (P)              Vitals  Vitals:    01/11/24 1107   BP: 118/58   BP Location: Left arm   Patient Position: Sitting   Cuff Size: Adult   Pulse: (!) 107   SpO2: (!) 79%   Height: 5' 8\" (1.727 m)       Physical Exam  Vitals and nursing note reviewed.   Constitutional:       General: He is not in acute distress.     Appearance: Normal appearance. He is well-developed. He is not diaphoretic.      Comments: nasal oxygen a little more frail than last meeting   HENT:      Head: Normocephalic and atraumatic.   Pulmonary:      Effort: Pulmonary effort is normal.      Comments: No cough or audible wheeze  Abdominal:      General: There is no distension.      Tenderness: There is no abdominal tenderness. There is no right CVA tenderness or left CVA tenderness.   Musculoskeletal:      Right lower leg: No edema.      Left lower leg: No edema.   Skin:     General: Skin is warm and dry.   Neurological:      Mental Status: He is alert and oriented to person, place, and time.      Gait: Gait normal.   Psychiatric:         Speech: Speech normal.         Behavior: Behavior normal.           Past History  Past Medical History:   Diagnosis Date    Abnormal ECG 2021 OR 2022    Alcoholism (HCC) 1984    sober 38 years    " Arthritis 2008    beginning    Bilateral carotid artery stenosis     Callus     Cancer (HCC)     skin    Chronic diastolic heart failure (HCC)     Chronic ischemic heart disease     Chronic kidney disease     stage 3    Colon polyp     COPD (chronic obstructive pulmonary disease) (HCC)     Coronary artery disease     hx stents, MI, PCI    COVID 2021    COVID 2023    CPAP (continuous positive airway pressure) dependence     Disease of thyroid gland 2017    nodules    Emphysema of lung (HCC)     started    Hearing loss     History of transfusion     Hyperlipidemia     Hypertension     Intracranial aneurysm 2023    Lung nodule     x rays    MI (myocardial infarction) ([facility])     Myocardial infarction ([facility])     Pneumonia     Pneumothorax 2023    collapsed lung    Prostate cancer ([facility])     RSV (respiratory syncytial virus infection) 10/2022    S/P carotid endarterectomy     Shortness of breath     O2 2 l/nc PRN    Sleep apnea     Sleep apnea, obstructive     Stented coronary artery      Social History     Socioeconomic History    Marital status: /Civil Union     Spouse name: None    Number of children: None    Years of education: None    Highest education level: None   Occupational History    None   Tobacco Use    Smoking status: Former     Current packs/day: 0.00     Average packs/day: 2.0 packs/day for 35.5 years (71.0 ttl pk-yrs)     Types: Cigarettes     Start date: 1960     Quit date: 1995     Years since quittin.5    Smokeless tobacco: Never    Tobacco comments:     stopped smoking the day i had a heart attack   Vaping Use    Vaping status: Never Used   Substance and Sexual Activity    Alcohol use: Not Currently    Drug use: Never    Sexual activity: Not Currently     Partners: Female     Birth control/protection: None   Other Topics Concern    None   Social History Narrative    None     Social Determinants of Health     Financial Resource Strain: Patient  Declined (2023)    Overall Financial Resource Strain (CARDIA)     Difficulty of Paying Living Expenses: Patient declined   Food Insecurity: No Food Insecurity (10/19/2023)    Hunger Vital Sign     Worried About Running Out of Food in the Last Year: Never true     Ran Out of Food in the Last Year: Never true   Transportation Needs: No Transportation Needs (10/19/2023)    PRAPARE - Transportation     Lack of Transportation (Medical): No     Lack of Transportation (Non-Medical): No   Physical Activity: Not on file   Stress: Not on file   Social Connections: Not on file   Intimate Partner Violence: Not At Risk (2021)    Received from [facility]    Interpersonal Safety     Social Determinants: Intimate Partner Violence Past Fear: Not on file     Social Determinants: Intimate Partner Violence Current Fear: Not on file   Housing Stability: Low Risk  (10/19/2023)    Housing Stability Vital Sign     Unable to Pay for Housing in the Last Year: No     Number of Places Lived in the Last Year: 1     Unstable Housing in the Last Year: No     Social History     Tobacco Use   Smoking Status Former    Current packs/day: 0.00    Average packs/day: 2.0 packs/day for 35.5 years (71.0 ttl pk-yrs)    Types: Cigarettes    Start date: 1960    Quit date: 1995    Years since quittin.5   Smokeless Tobacco Never   Tobacco Comments    stopped smoking the day i had a heart attack     Family History   Problem Relation Age of Onset    Heart attack Mother     Dementia Mother     Heart failure Mother          2006    Heart disease Mother         heart attacks (2)    No Known Problems Father        The following portions of the patient's history were reviewed and updated as appropriate: allergies, current medications, past medical history, past social history, past surgical history and problem list.    Results  No results found for this or any previous visit (from the past 1 hour(s)).]  Lab Results   Component  Value Date    PSA 0.03 01/05/2024    PSA 0.25 09/12/2023    PSA 5.53 (H) 08/09/2023    PSA 11.23 (H) 06/15/2023     Lab Results   Component Value Date    CALCIUM 9.2 01/05/2024    K 3.4 (L) 01/05/2024    CO2 35 (H) 01/05/2024    CL 99 01/05/2024    BUN 22 01/05/2024    CREATININE 1.29 01/05/2024     Lab Results   Component Value Date    WBC 7.11 12/29/2023    HGB 13.2 12/29/2023    HCT 41.7 12/29/2023     (H) 12/29/2023     12/29/2023

## 2024-01-11 NOTE — ASSESSMENT & PLAN NOTE
Acute exacerbation on today's exam.  Would not recommend additional systemic steroids at this time.  Continue all nebulized regimen of Perforomist/Pulmicort twice daily and DuoNeb 4 times daily.  Not a candidate for endobronchial valves due to EF less than 45%. Continue informal increased activities, previously complete home PT. There is pulmonary rehab that was ordered for which he is considering going.

## 2024-01-11 NOTE — Clinical Note
since he is only getting lupron monotherapy does he need continuous, or can we do intermittent with a psa threshold? i gave him an injection today

## 2024-01-12 ENCOUNTER — HOME CARE VISIT (OUTPATIENT)
Dept: HOME HEALTH SERVICES | Facility: HOME HEALTHCARE | Age: 79
End: 2024-01-12
Payer: COMMERCIAL

## 2024-01-12 ENCOUNTER — LAB REQUISITION (OUTPATIENT)
Dept: LAB | Facility: HOSPITAL | Age: 79
DRG: 177 | End: 2024-01-12
Payer: COMMERCIAL

## 2024-01-12 ENCOUNTER — PATIENT MESSAGE (OUTPATIENT)
Dept: FAMILY MEDICINE CLINIC | Facility: CLINIC | Age: 79
End: 2024-01-12

## 2024-01-12 DIAGNOSIS — J44.9 CHRONIC OBSTRUCTIVE PULMONARY DISEASE, UNSPECIFIED COPD TYPE (HCC): Primary | ICD-10-CM

## 2024-01-12 DIAGNOSIS — E87.6 HYPOKALEMIA: ICD-10-CM

## 2024-01-12 LAB
ANION GAP SERPL CALCULATED.3IONS-SCNC: 7 MMOL/L
BUN SERPL-MCNC: 14 MG/DL (ref 5–25)
CALCIUM SERPL-MCNC: 9.1 MG/DL (ref 8.4–10.2)
CHLORIDE SERPL-SCNC: 102 MMOL/L (ref 96–108)
CO2 SERPL-SCNC: 32 MMOL/L (ref 21–32)
CREAT SERPL-MCNC: 1.22 MG/DL (ref 0.6–1.3)
GFR SERPL CREATININE-BSD FRML MDRD: 56 ML/MIN/1.73SQ M
GLUCOSE SERPL-MCNC: 98 MG/DL (ref 65–140)
POTASSIUM SERPL-SCNC: 5.3 MMOL/L (ref 3.5–5.3)
SODIUM SERPL-SCNC: 141 MMOL/L (ref 135–147)

## 2024-01-12 PROCEDURE — G0299 HHS/HOSPICE OF RN EA 15 MIN: HCPCS

## 2024-01-12 PROCEDURE — 80048 BASIC METABOLIC PNL TOTAL CA: CPT | Performed by: FAMILY MEDICINE

## 2024-01-12 RX ORDER — PREDNISONE 10 MG/1
TABLET ORAL DAILY
Qty: 32 TABLET | Refills: 0 | Status: ON HOLD | OUTPATIENT
Start: 2024-01-12 | End: 2024-01-17

## 2024-01-14 ENCOUNTER — HOSPITAL ENCOUNTER (INPATIENT)
Facility: HOSPITAL | Age: 79
LOS: 3 days | Discharge: HOME HEALTH | DRG: 177 | End: 2024-01-17
Attending: EMERGENCY MEDICINE | Admitting: STUDENT IN AN ORGANIZED HEALTH CARE EDUCATION/TRAINING PROGRAM
Payer: COMMERCIAL

## 2024-01-14 ENCOUNTER — APPOINTMENT (EMERGENCY)
Dept: RADIOLOGY | Facility: HOSPITAL | Age: 79
DRG: 177 | End: 2024-01-14
Payer: COMMERCIAL

## 2024-01-14 ENCOUNTER — HOME CARE VISIT (OUTPATIENT)
Dept: HOME HEALTH SERVICES | Facility: HOME HEALTHCARE | Age: 79
End: 2024-01-14
Payer: COMMERCIAL

## 2024-01-14 DIAGNOSIS — J96.21 ACUTE AND CHRONIC RESPIRATORY FAILURE WITH HYPOXIA (HCC): ICD-10-CM

## 2024-01-14 DIAGNOSIS — I50.43 ACUTE ON CHRONIC COMBINED SYSTOLIC (CONGESTIVE) AND DIASTOLIC (CONGESTIVE) HEART FAILURE (HCC): ICD-10-CM

## 2024-01-14 DIAGNOSIS — J44.1 COPD EXACERBATION (HCC): Primary | ICD-10-CM

## 2024-01-14 DIAGNOSIS — R07.9 CHEST PAIN, UNSPECIFIED TYPE: ICD-10-CM

## 2024-01-14 DIAGNOSIS — J18.9 RIGHT LOWER LOBE PNEUMONIA: ICD-10-CM

## 2024-01-14 DIAGNOSIS — J44.9 CHRONIC OBSTRUCTIVE PULMONARY DISEASE, UNSPECIFIED COPD TYPE (HCC): ICD-10-CM

## 2024-01-14 PROBLEM — R13.10 DYSPHAGIA: Chronic | Status: ACTIVE | Noted: 2023-05-04

## 2024-01-14 PROBLEM — R09.02 HYPOXIA: Status: RESOLVED | Noted: 2021-01-11 | Resolved: 2024-01-14

## 2024-01-14 PROBLEM — E87.6 HYPOKALEMIA: Status: RESOLVED | Noted: 2023-01-11 | Resolved: 2024-01-14

## 2024-01-14 PROBLEM — R09.02 HYPOXEMIA: Status: RESOLVED | Noted: 2023-04-28 | Resolved: 2024-01-14

## 2024-01-14 PROBLEM — I13.0 HYPERTENSIVE HEART AND CHRONIC KIDNEY DISEASE WITH HEART FAILURE AND STAGE 1 THROUGH STAGE 4 CHRONIC KIDNEY DISEASE, OR UNSPECIFIED CHRONIC KIDNEY DISEASE (HCC): Chronic | Status: ACTIVE | Noted: 2023-04-17

## 2024-01-14 PROBLEM — I50.22 CHRONIC SYSTOLIC CONGESTIVE HEART FAILURE (HCC): Chronic | Status: ACTIVE | Noted: 2023-10-22

## 2024-01-14 PROBLEM — D72.829 LEUKOCYTOSIS: Status: RESOLVED | Noted: 2023-02-21 | Resolved: 2024-01-14

## 2024-01-14 PROBLEM — R42 VERTIGO: Status: RESOLVED | Noted: 2021-01-11 | Resolved: 2024-01-14

## 2024-01-14 PROBLEM — R26.2 AMBULATORY DYSFUNCTION: Status: RESOLVED | Noted: 2023-01-09 | Resolved: 2024-01-14

## 2024-01-14 PROBLEM — R33.9 RETENTION OF URINE, UNSPECIFIED: Status: RESOLVED | Noted: 2023-04-17 | Resolved: 2024-01-14

## 2024-01-14 PROBLEM — G47.33 OSA ON CPAP: Chronic | Status: ACTIVE | Noted: 2021-01-11

## 2024-01-14 PROBLEM — R35.0 URINARY FREQUENCY: Status: RESOLVED | Noted: 2022-04-27 | Resolved: 2024-01-14

## 2024-01-14 PROBLEM — E66.9 OBESITY: Status: RESOLVED | Noted: 2021-01-11 | Resolved: 2024-01-14

## 2024-01-14 PROBLEM — U07.1 COVID-19: Status: RESOLVED | Noted: 2023-01-09 | Resolved: 2024-01-14

## 2024-01-14 LAB
2HR DELTA HS TROPONIN: -13 NG/L
4HR DELTA HS TROPONIN: -12 NG/L
ALBUMIN SERPL BCP-MCNC: 3.7 G/DL (ref 3.5–5)
ALP SERPL-CCNC: 71 U/L (ref 34–104)
ALT SERPL W P-5'-P-CCNC: 22 U/L (ref 7–52)
ANION GAP SERPL CALCULATED.3IONS-SCNC: 8 MMOL/L
APTT PPP: 30 SECONDS (ref 23–37)
AST SERPL W P-5'-P-CCNC: 28 U/L (ref 13–39)
ATRIAL RATE: 100 BPM
ATRIAL RATE: 103 BPM
ATRIAL RATE: 110 BPM
BASOPHILS # BLD AUTO: 0.05 THOUSANDS/ÂΜL (ref 0–0.1)
BASOPHILS NFR BLD AUTO: 0 % (ref 0–1)
BILIRUB SERPL-MCNC: 0.68 MG/DL (ref 0.2–1)
BNP SERPL-MCNC: 3360 PG/ML (ref 0–100)
BUN SERPL-MCNC: 18 MG/DL (ref 5–25)
CALCIUM SERPL-MCNC: 9 MG/DL (ref 8.4–10.2)
CARDIAC TROPONIN I PNL SERPL HS: 37 NG/L
CARDIAC TROPONIN I PNL SERPL HS: 38 NG/L
CARDIAC TROPONIN I PNL SERPL HS: 50 NG/L
CHLORIDE SERPL-SCNC: 101 MMOL/L (ref 96–108)
CO2 SERPL-SCNC: 30 MMOL/L (ref 21–32)
CREAT SERPL-MCNC: 1.65 MG/DL (ref 0.6–1.3)
EOSINOPHIL # BLD AUTO: 0.08 THOUSAND/ÂΜL (ref 0–0.61)
EOSINOPHIL NFR BLD AUTO: 1 % (ref 0–6)
ERYTHROCYTE [DISTWIDTH] IN BLOOD BY AUTOMATED COUNT: 17.7 % (ref 11.6–15.1)
FLUAV RNA RESP QL NAA+PROBE: NEGATIVE
FLUBV RNA RESP QL NAA+PROBE: NEGATIVE
GFR SERPL CREATININE-BSD FRML MDRD: 38 ML/MIN/1.73SQ M
GLUCOSE SERPL-MCNC: 152 MG/DL (ref 65–140)
HCT VFR BLD AUTO: 45.7 % (ref 36.5–49.3)
HGB BLD-MCNC: 13.7 G/DL (ref 12–17)
IMM GRANULOCYTES # BLD AUTO: 0.04 THOUSAND/UL (ref 0–0.2)
IMM GRANULOCYTES NFR BLD AUTO: 0 % (ref 0–2)
INR PPP: 1.18 (ref 0.84–1.19)
LACTATE SERPL-SCNC: 0.7 MMOL/L (ref 0.5–2)
LYMPHOCYTES # BLD AUTO: 1.07 THOUSANDS/ÂΜL (ref 0.6–4.47)
LYMPHOCYTES NFR BLD AUTO: 9 % (ref 14–44)
MCH RBC QN AUTO: 31.7 PG (ref 26.8–34.3)
MCHC RBC AUTO-ENTMCNC: 30 G/DL (ref 31.4–37.4)
MCV RBC AUTO: 106 FL (ref 82–98)
MONOCYTES # BLD AUTO: 1.25 THOUSAND/ÂΜL (ref 0.17–1.22)
MONOCYTES NFR BLD AUTO: 11 % (ref 4–12)
NEUTROPHILS # BLD AUTO: 8.95 THOUSANDS/ÂΜL (ref 1.85–7.62)
NEUTS SEG NFR BLD AUTO: 79 % (ref 43–75)
NRBC BLD AUTO-RTO: 0 /100 WBCS
P AXIS: 51 DEGREES
P AXIS: 75 DEGREES
P AXIS: 75 DEGREES
PLATELET # BLD AUTO: 181 THOUSANDS/UL (ref 149–390)
PMV BLD AUTO: 10.5 FL (ref 8.9–12.7)
POTASSIUM SERPL-SCNC: 4.4 MMOL/L (ref 3.5–5.3)
PR INTERVAL: 154 MS
PR INTERVAL: 156 MS
PR INTERVAL: 158 MS
PROCALCITONIN SERPL-MCNC: 0.09 NG/ML
PROT SERPL-MCNC: 5.9 G/DL (ref 6.4–8.4)
PROTHROMBIN TIME: 15.1 SECONDS (ref 11.6–14.5)
QRS AXIS: 75 DEGREES
QRS AXIS: 80 DEGREES
QRS AXIS: 86 DEGREES
QRSD INTERVAL: 90 MS
QRSD INTERVAL: 96 MS
QRSD INTERVAL: 96 MS
QT INTERVAL: 340 MS
QT INTERVAL: 362 MS
QT INTERVAL: 370 MS
QTC INTERVAL: 460 MS
QTC INTERVAL: 466 MS
QTC INTERVAL: 484 MS
RBC # BLD AUTO: 4.32 MILLION/UL (ref 3.88–5.62)
RSV RNA RESP QL NAA+PROBE: NEGATIVE
SARS-COV-2 RNA RESP QL NAA+PROBE: NEGATIVE
SODIUM SERPL-SCNC: 139 MMOL/L (ref 135–147)
T WAVE AXIS: -40 DEGREES
T WAVE AXIS: 1 DEGREES
T WAVE AXIS: 52 DEGREES
TSH SERPL DL<=0.05 MIU/L-ACNC: 1.39 UIU/ML (ref 0.45–4.5)
VENTRICULAR RATE: 100 BPM
VENTRICULAR RATE: 103 BPM
VENTRICULAR RATE: 110 BPM
WBC # BLD AUTO: 11.44 THOUSAND/UL (ref 4.31–10.16)

## 2024-01-14 PROCEDURE — 71045 X-RAY EXAM CHEST 1 VIEW: CPT

## 2024-01-14 PROCEDURE — 94640 AIRWAY INHALATION TREATMENT: CPT

## 2024-01-14 PROCEDURE — 87070 CULTURE OTHR SPECIMN AEROBIC: CPT | Performed by: EMERGENCY MEDICINE

## 2024-01-14 PROCEDURE — 0241U HB NFCT DS VIR RESP RNA 4 TRGT: CPT | Performed by: EMERGENCY MEDICINE

## 2024-01-14 PROCEDURE — 96360 HYDRATION IV INFUSION INIT: CPT

## 2024-01-14 PROCEDURE — 99291 CRITICAL CARE FIRST HOUR: CPT | Performed by: EMERGENCY MEDICINE

## 2024-01-14 PROCEDURE — 99223 1ST HOSP IP/OBS HIGH 75: CPT | Performed by: INTERNAL MEDICINE

## 2024-01-14 PROCEDURE — 87077 CULTURE AEROBIC IDENTIFY: CPT | Performed by: EMERGENCY MEDICINE

## 2024-01-14 PROCEDURE — 85730 THROMBOPLASTIN TIME PARTIAL: CPT | Performed by: EMERGENCY MEDICINE

## 2024-01-14 PROCEDURE — 87205 SMEAR GRAM STAIN: CPT | Performed by: EMERGENCY MEDICINE

## 2024-01-14 PROCEDURE — 87186 SC STD MICRODIL/AGAR DIL: CPT | Performed by: EMERGENCY MEDICINE

## 2024-01-14 PROCEDURE — 87040 BLOOD CULTURE FOR BACTERIA: CPT | Performed by: EMERGENCY MEDICINE

## 2024-01-14 PROCEDURE — 94760 N-INVAS EAR/PLS OXIMETRY 1: CPT

## 2024-01-14 PROCEDURE — 99285 EMERGENCY DEPT VISIT HI MDM: CPT

## 2024-01-14 PROCEDURE — 36415 COLL VENOUS BLD VENIPUNCTURE: CPT | Performed by: EMERGENCY MEDICINE

## 2024-01-14 PROCEDURE — 80053 COMPREHEN METABOLIC PANEL: CPT | Performed by: EMERGENCY MEDICINE

## 2024-01-14 PROCEDURE — 93005 ELECTROCARDIOGRAM TRACING: CPT

## 2024-01-14 PROCEDURE — 84443 ASSAY THYROID STIM HORMONE: CPT | Performed by: EMERGENCY MEDICINE

## 2024-01-14 PROCEDURE — 83880 ASSAY OF NATRIURETIC PEPTIDE: CPT | Performed by: PHYSICIAN ASSISTANT

## 2024-01-14 PROCEDURE — 83605 ASSAY OF LACTIC ACID: CPT | Performed by: EMERGENCY MEDICINE

## 2024-01-14 PROCEDURE — 85610 PROTHROMBIN TIME: CPT | Performed by: EMERGENCY MEDICINE

## 2024-01-14 PROCEDURE — 99223 1ST HOSP IP/OBS HIGH 75: CPT | Performed by: HOSPITALIST

## 2024-01-14 PROCEDURE — 87449 NOS EACH ORGANISM AG IA: CPT | Performed by: PHYSICIAN ASSISTANT

## 2024-01-14 PROCEDURE — 93010 ELECTROCARDIOGRAM REPORT: CPT | Performed by: INTERNAL MEDICINE

## 2024-01-14 PROCEDURE — 84484 ASSAY OF TROPONIN QUANT: CPT | Performed by: EMERGENCY MEDICINE

## 2024-01-14 PROCEDURE — 94664 DEMO&/EVAL PT USE INHALER: CPT

## 2024-01-14 PROCEDURE — 85025 COMPLETE CBC W/AUTO DIFF WBC: CPT | Performed by: EMERGENCY MEDICINE

## 2024-01-14 PROCEDURE — 94002 VENT MGMT INPAT INIT DAY: CPT

## 2024-01-14 PROCEDURE — 84145 PROCALCITONIN (PCT): CPT | Performed by: EMERGENCY MEDICINE

## 2024-01-14 RX ORDER — ECHINACEA PURPUREA EXTRACT 125 MG
1 TABLET ORAL
Status: DISCONTINUED | OUTPATIENT
Start: 2024-01-14 | End: 2024-01-17 | Stop reason: HOSPADM

## 2024-01-14 RX ORDER — SODIUM CHLORIDE 9 MG/ML
3 INJECTION INTRAVENOUS
Status: DISCONTINUED | OUTPATIENT
Start: 2024-01-14 | End: 2024-01-17 | Stop reason: HOSPADM

## 2024-01-14 RX ORDER — HYDROCODONE BITARTRATE AND ACETAMINOPHEN 5; 325 MG/1; MG/1
1 TABLET ORAL EVERY 6 HOURS PRN
Status: DISCONTINUED | OUTPATIENT
Start: 2024-01-14 | End: 2024-01-17 | Stop reason: HOSPADM

## 2024-01-14 RX ORDER — ENOXAPARIN SODIUM 100 MG/ML
40 INJECTION SUBCUTANEOUS DAILY
Status: DISCONTINUED | OUTPATIENT
Start: 2024-01-14 | End: 2024-01-17 | Stop reason: HOSPADM

## 2024-01-14 RX ORDER — FLUTICASONE PROPIONATE 50 MCG
1 SPRAY, SUSPENSION (ML) NASAL 2 TIMES DAILY
Status: DISCONTINUED | OUTPATIENT
Start: 2024-01-14 | End: 2024-01-17 | Stop reason: HOSPADM

## 2024-01-14 RX ORDER — CEFEPIME HYDROCHLORIDE 1 G/1
1000 INJECTION, POWDER, FOR SOLUTION INTRAMUSCULAR; INTRAVENOUS EVERY 12 HOURS
Status: DISCONTINUED | OUTPATIENT
Start: 2024-01-14 | End: 2024-01-14

## 2024-01-14 RX ORDER — FUROSEMIDE 10 MG/ML
40 INJECTION INTRAMUSCULAR; INTRAVENOUS
Status: DISCONTINUED | OUTPATIENT
Start: 2024-01-14 | End: 2024-01-15

## 2024-01-14 RX ORDER — PREDNISONE 5 MG/1
5 TABLET ORAL DAILY
Status: DISCONTINUED | OUTPATIENT
Start: 2024-01-25 | End: 2024-01-17 | Stop reason: HOSPADM

## 2024-01-14 RX ORDER — FUROSEMIDE 20 MG/1
20 TABLET ORAL DAILY
Status: DISCONTINUED | OUTPATIENT
Start: 2024-01-14 | End: 2024-01-14

## 2024-01-14 RX ORDER — BUDESONIDE 0.5 MG/2ML
0.5 INHALANT ORAL
Status: DISCONTINUED | OUTPATIENT
Start: 2024-01-14 | End: 2024-01-17 | Stop reason: HOSPADM

## 2024-01-14 RX ORDER — ASPIRIN 81 MG/1
81 TABLET, CHEWABLE ORAL
Status: DISCONTINUED | OUTPATIENT
Start: 2024-01-14 | End: 2024-01-17 | Stop reason: HOSPADM

## 2024-01-14 RX ORDER — ONDANSETRON 2 MG/ML
4 INJECTION INTRAMUSCULAR; INTRAVENOUS EVERY 6 HOURS PRN
Status: DISCONTINUED | OUTPATIENT
Start: 2024-01-14 | End: 2024-01-17 | Stop reason: HOSPADM

## 2024-01-14 RX ORDER — FAMOTIDINE 20 MG/1
20 TABLET, FILM COATED ORAL
Status: DISCONTINUED | OUTPATIENT
Start: 2024-01-14 | End: 2024-01-17 | Stop reason: HOSPADM

## 2024-01-14 RX ORDER — MELATONIN
1000 DAILY
Status: DISCONTINUED | OUTPATIENT
Start: 2024-01-14 | End: 2024-01-17 | Stop reason: HOSPADM

## 2024-01-14 RX ORDER — FORMOTEROL FUMARATE DIHYDRATE 20 UG/2ML
20 SOLUTION RESPIRATORY (INHALATION)
Status: DISCONTINUED | OUTPATIENT
Start: 2024-01-14 | End: 2024-01-17 | Stop reason: HOSPADM

## 2024-01-14 RX ORDER — PREDNISONE 20 MG/1
20 TABLET ORAL DAILY
Status: DISCONTINUED | OUTPATIENT
Start: 2024-01-19 | End: 2024-01-17 | Stop reason: HOSPADM

## 2024-01-14 RX ORDER — FORMOTEROL FUMARATE DIHYDRATE 20 UG/2ML
20 SOLUTION RESPIRATORY (INHALATION) 2 TIMES DAILY
Status: DISCONTINUED | OUTPATIENT
Start: 2024-01-14 | End: 2024-01-14

## 2024-01-14 RX ORDER — METOPROLOL SUCCINATE 25 MG/1
12.5 TABLET, EXTENDED RELEASE ORAL 2 TIMES DAILY
Status: DISCONTINUED | OUTPATIENT
Start: 2024-01-14 | End: 2024-01-17 | Stop reason: HOSPADM

## 2024-01-14 RX ORDER — SODIUM CHLORIDE FOR INHALATION 3 %
4 VIAL, NEBULIZER (ML) INHALATION
Status: COMPLETED | OUTPATIENT
Start: 2024-01-14 | End: 2024-01-16

## 2024-01-14 RX ORDER — LEVALBUTEROL INHALATION SOLUTION 1.25 MG/3ML
1.25 SOLUTION RESPIRATORY (INHALATION)
Status: DISCONTINUED | OUTPATIENT
Start: 2024-01-14 | End: 2024-01-17 | Stop reason: HOSPADM

## 2024-01-14 RX ORDER — ACETAMINOPHEN 325 MG/1
650 TABLET ORAL EVERY 6 HOURS PRN
Status: DISCONTINUED | OUTPATIENT
Start: 2024-01-14 | End: 2024-01-17 | Stop reason: HOSPADM

## 2024-01-14 RX ORDER — PANTOPRAZOLE SODIUM 20 MG/1
20 TABLET, DELAYED RELEASE ORAL
Status: DISCONTINUED | OUTPATIENT
Start: 2024-01-14 | End: 2024-01-17 | Stop reason: HOSPADM

## 2024-01-14 RX ORDER — BUDESONIDE 0.5 MG/2ML
0.5 INHALANT ORAL 2 TIMES DAILY
Status: DISCONTINUED | OUTPATIENT
Start: 2024-01-14 | End: 2024-01-14

## 2024-01-14 RX ORDER — CALCIUM CARBONATE 500 MG/1
1000 TABLET, CHEWABLE ORAL 2 TIMES DAILY PRN
Status: DISCONTINUED | OUTPATIENT
Start: 2024-01-14 | End: 2024-01-17 | Stop reason: HOSPADM

## 2024-01-14 RX ORDER — CEFEPIME HYDROCHLORIDE 2 G/1
2000 INJECTION, POWDER, FOR SOLUTION INTRAVENOUS EVERY 12 HOURS
Status: DISCONTINUED | OUTPATIENT
Start: 2024-01-14 | End: 2024-01-14

## 2024-01-14 RX ORDER — SODIUM CHLORIDE FOR INHALATION 3 %
4 VIAL, NEBULIZER (ML) INHALATION 3 TIMES DAILY
Status: DISCONTINUED | OUTPATIENT
Start: 2024-01-14 | End: 2024-01-14

## 2024-01-14 RX ORDER — ENOXAPARIN SODIUM 100 MG/ML
30 INJECTION SUBCUTANEOUS DAILY
Status: DISCONTINUED | OUTPATIENT
Start: 2024-01-14 | End: 2024-01-14

## 2024-01-14 RX ORDER — CEFEPIME HYDROCHLORIDE 1 G/50ML
1000 INJECTION, SOLUTION INTRAVENOUS EVERY 12 HOURS
Status: DISCONTINUED | OUTPATIENT
Start: 2024-01-14 | End: 2024-01-16

## 2024-01-14 RX ORDER — GUAIFENESIN/DEXTROMETHORPHAN 100-10MG/5
5 SYRUP ORAL 3 TIMES DAILY PRN
Status: DISCONTINUED | OUTPATIENT
Start: 2024-01-14 | End: 2024-01-17 | Stop reason: HOSPADM

## 2024-01-14 RX ORDER — SODIUM CHLORIDE FOR INHALATION 0.9 %
12 VIAL, NEBULIZER (ML) INHALATION ONCE
Status: COMPLETED | OUTPATIENT
Start: 2024-01-14 | End: 2024-01-14

## 2024-01-14 RX ORDER — CEFEPIME HYDROCHLORIDE 2 G/50ML
2000 INJECTION, SOLUTION INTRAVENOUS ONCE
Status: COMPLETED | OUTPATIENT
Start: 2024-01-14 | End: 2024-01-14

## 2024-01-14 RX ORDER — ATORVASTATIN CALCIUM 80 MG/1
80 TABLET, FILM COATED ORAL
Status: DISCONTINUED | OUTPATIENT
Start: 2024-01-14 | End: 2024-01-17 | Stop reason: HOSPADM

## 2024-01-14 RX ORDER — METHOCARBAMOL 500 MG/1
500 TABLET, FILM COATED ORAL 3 TIMES DAILY PRN
Status: DISCONTINUED | OUTPATIENT
Start: 2024-01-14 | End: 2024-01-17 | Stop reason: HOSPADM

## 2024-01-14 RX ORDER — PREDNISONE 20 MG/1
40 TABLET ORAL DAILY
Status: COMPLETED | OUTPATIENT
Start: 2024-01-14 | End: 2024-01-15

## 2024-01-14 RX ORDER — POTASSIUM CHLORIDE 20MEQ/15ML
40 LIQUID (ML) ORAL DAILY
Status: DISCONTINUED | OUTPATIENT
Start: 2024-01-14 | End: 2024-01-17 | Stop reason: HOSPADM

## 2024-01-14 RX ORDER — GABAPENTIN 300 MG/1
300 CAPSULE ORAL
Status: DISCONTINUED | OUTPATIENT
Start: 2024-01-14 | End: 2024-01-17 | Stop reason: HOSPADM

## 2024-01-14 RX ORDER — PREDNISONE 10 MG/1
10 TABLET ORAL DAILY
Status: DISCONTINUED | OUTPATIENT
Start: 2024-01-22 | End: 2024-01-17 | Stop reason: HOSPADM

## 2024-01-14 RX ORDER — NITROGLYCERIN 20 MG/100ML
5-200 INJECTION INTRAVENOUS
Status: DISCONTINUED | OUTPATIENT
Start: 2024-01-14 | End: 2024-01-14

## 2024-01-14 RX ORDER — ALPRAZOLAM 0.25 MG/1
0.25 TABLET ORAL
Status: DISCONTINUED | OUTPATIENT
Start: 2024-01-14 | End: 2024-01-17 | Stop reason: HOSPADM

## 2024-01-14 RX ADMIN — ACETAMINOPHEN 650 MG: 325 TABLET ORAL at 06:23

## 2024-01-14 RX ADMIN — LEVALBUTEROL HYDROCHLORIDE 1.25 MG: 1.25 SOLUTION RESPIRATORY (INHALATION) at 13:07

## 2024-01-14 RX ADMIN — GABAPENTIN 300 MG: 300 CAPSULE ORAL at 22:28

## 2024-01-14 RX ADMIN — BUDESONIDE INHALATION 0.5 MG: 0.5 SUSPENSION RESPIRATORY (INHALATION) at 19:14

## 2024-01-14 RX ADMIN — LEVALBUTEROL HYDROCHLORIDE 1.25 MG: 1.25 SOLUTION RESPIRATORY (INHALATION) at 07:08

## 2024-01-14 RX ADMIN — FAMOTIDINE 20 MG: 20 TABLET ORAL at 22:28

## 2024-01-14 RX ADMIN — CEFEPIME HYDROCHLORIDE 2000 MG: 2 INJECTION, SOLUTION INTRAVENOUS at 04:18

## 2024-01-14 RX ADMIN — PREDNISONE 40 MG: 20 TABLET ORAL at 08:27

## 2024-01-14 RX ADMIN — SALINE NASAL SPRAY 1 SPRAY: 1.5 SOLUTION NASAL at 18:06

## 2024-01-14 RX ADMIN — Medication 1000 UNITS: at 08:27

## 2024-01-14 RX ADMIN — AZITHROMYCIN MONOHYDRATE 500 MG: 500 INJECTION, POWDER, LYOPHILIZED, FOR SOLUTION INTRAVENOUS at 05:22

## 2024-01-14 RX ADMIN — ALBUTEROL SULFATE 10 MG: 2.5 SOLUTION RESPIRATORY (INHALATION) at 02:33

## 2024-01-14 RX ADMIN — POTASSIUM CHLORIDE 40 MEQ: 1.5 SOLUTION ORAL at 08:27

## 2024-01-14 RX ADMIN — CALCIUM CARBONATE (ANTACID) CHEW TAB 500 MG 1000 MG: 500 CHEW TAB at 06:23

## 2024-01-14 RX ADMIN — EMPAGLIFLOZIN 10 MG: 10 TABLET, FILM COATED ORAL at 08:29

## 2024-01-14 RX ADMIN — FLUTICASONE PROPIONATE 1 SPRAY: 50 SPRAY, METERED NASAL at 18:06

## 2024-01-14 RX ADMIN — SODIUM CHLORIDE SOLN NEBU 3% 4 ML: 3 NEBU SOLN at 19:14

## 2024-01-14 RX ADMIN — CEFEPIME HYDROCHLORIDE 1000 MG: 1 INJECTION, SOLUTION INTRAVENOUS at 19:04

## 2024-01-14 RX ADMIN — IPRATROPIUM BROMIDE 1 MG: 0.5 SOLUTION RESPIRATORY (INHALATION) at 02:33

## 2024-01-14 RX ADMIN — Medication 12 ML: at 02:33

## 2024-01-14 RX ADMIN — ATORVASTATIN CALCIUM 80 MG: 80 TABLET, FILM COATED ORAL at 18:06

## 2024-01-14 RX ADMIN — FUROSEMIDE 40 MG: 10 INJECTION, SOLUTION INTRAMUSCULAR; INTRAVENOUS at 18:05

## 2024-01-14 RX ADMIN — FORMOTEROL FUMARATE DIHYDRATE 20 MCG: 20 SOLUTION RESPIRATORY (INHALATION) at 19:42

## 2024-01-14 RX ADMIN — FLUTICASONE PROPIONATE 1 SPRAY: 50 SPRAY, METERED NASAL at 08:29

## 2024-01-14 RX ADMIN — SALINE NASAL SPRAY 1 SPRAY: 1.5 SOLUTION NASAL at 10:52

## 2024-01-14 RX ADMIN — ENOXAPARIN SODIUM 40 MG: 40 INJECTION SUBCUTANEOUS at 08:27

## 2024-01-14 RX ADMIN — FORMOTEROL FUMARATE DIHYDRATE 20 MCG: 20 SOLUTION RESPIRATORY (INHALATION) at 07:21

## 2024-01-14 RX ADMIN — LEVALBUTEROL HYDROCHLORIDE 1.25 MG: 1.25 SOLUTION RESPIRATORY (INHALATION) at 19:13

## 2024-01-14 RX ADMIN — PANTOPRAZOLE SODIUM 20 MG: 20 TABLET, DELAYED RELEASE ORAL at 05:24

## 2024-01-14 RX ADMIN — FUROSEMIDE 20 MG: 20 TABLET ORAL at 08:27

## 2024-01-14 RX ADMIN — SODIUM CHLORIDE 250 ML: 0.9 INJECTION, SOLUTION INTRAVENOUS at 02:51

## 2024-01-14 RX ADMIN — BUDESONIDE INHALATION 0.5 MG: 0.5 SUSPENSION RESPIRATORY (INHALATION) at 07:08

## 2024-01-14 RX ADMIN — ASPIRIN 81 MG CHEWABLE TABLET 81 MG: 81 TABLET CHEWABLE at 22:27

## 2024-01-14 NOTE — ED PROVIDER NOTES
History  Chief Complaint   Patient presents with    Chest Pain     Pt reports taking 2 nitro prior to arrival and reports relief    Shortness of Breath     Patient has extensive CAD with 10 previous stents, as well as end stage COPD. Patient felt at his baseline until today. He gradually became more short of breath. Eventually and hour prior to arrival he started to experience crushing substernal chest pain. Non radiating. It felt similar to his previous MI. History taking slightly difficult due to patients extreme dyspnea and overall state. Patient notes he is already on high dose steroids for COPD, albuterol, and has taken nitro x2 which slightly helped.        Prior to Admission Medications   Prescriptions Last Dose Informant Patient Reported? Taking?   ALPRAZolam (XANAX) 0.25 mg tablet Unknown Self, Spouse/Significant Other No No   Sig: Take 1 tablet (0.25 mg total) by mouth daily at bedtime as needed for anxiety   Empagliflozin (Jardiance) 10 MG TABS tablet  Self, Spouse/Significant Other No No   Sig: Take 1 tablet (10 mg total) by mouth every morning   HYDROcodone-acetaminophen (Norco) 5-325 mg per tablet  Spouse/Significant Other, Self No No   Sig: Take 1 tablet by mouth every 6 (six) hours as needed for pain Max Daily Amount: 4 tablets   Humidifier MISC  Self, Spouse/Significant Other No No   Sig: Use continuous   aspirin 81 mg chewable tablet 2024 Spouse/Significant Other, Self Yes Yes   Si mg daily at bedtime   budesonide (PULMICORT) 0.5 mg/2 mL nebulizer solution  Self, Spouse/Significant Other No No   Sig: Take 2 mL (0.5 mg total) by nebulization 2 (two) times a day Rinse mouth after use.   cholecalciferol (VITAMIN D3) 1,000 units tablet  Self, Spouse/Significant Other Yes No   Sig: Take 1,000 Units by mouth daily   dextromethorphan-guaiFENesin (ROBITUSSIN DM)  mg/5 mL syrup  Spouse/Significant Other, Self No No   Sig: Take 5 mL by mouth 3 (three) times a day as needed for cough    famotidine (PEPCID) 20 mg tablet  Spouse/Significant Other, Self No No   Sig: TAKE 1 TABLET BY MOUTH DAILY AT  BEDTIME   fluticasone (FLONASE) 50 mcg/act nasal spray  Self, Spouse/Significant Other No No   Si spray into each nostril 2 (two) times a day   formoterol (PERFOROMIST) 20 MCG/2ML nebulizer solution  Self, Spouse/Significant Other No No   Sig: Take 2 mL (20 mcg total) by nebulization 2 (two) times a day   furosemide (LASIX) 40 mg tablet  Self, Spouse/Significant Other No No   Sig: Take 0.5 tablets (20 mg total) by mouth daily   gabapentin (NEURONTIN) 300 mg capsule  Self, Spouse/Significant Other No No   Sig: Take 1 capsule (300 mg total) by mouth daily at bedtime   ipratropium-albuterol (DUO-NEB) 0.5-2.5 mg/3 mL nebulizer solution  Self, Spouse/Significant Other Yes No   methocarbamol (ROBAXIN) 500 mg tablet  Spouse/Significant Other, Self No No   Sig: Take 1 tablet (500 mg total) by mouth 3 (three) times a day as needed for muscle spasms   metoprolol succinate (TOPROL-XL) 25 mg 24 hr tablet  Self, Spouse/Significant Other No No   Sig: Take 0.5 tablets (12.5 mg total) by mouth 2 (two) times a day   naloxone (NARCAN) 4 mg/0.1 mL nasal spray  Spouse/Significant Other, Self No No   Sig: Administer 1 spray into a nostril. If no response after 2-3 minutes, give another dose in the other nostril using a new spray.   nystatin (MYCOSTATIN) 500,000 units/5 mL suspension   No No   Si ml orally (swish and spit/swallow) every four hours while awake   omeprazole (PriLOSEC) 20 mg delayed release capsule  Self, Spouse/Significant Other No No   Sig: take 1 capsule by mouth once daily   oxygen gas  Spouse/Significant Other, Self Yes No   Sig: Inhale 2 L/min continuous 2LPM at rest and 3-4 LPM with activity per D Cedric FANG notes   potassium chloride 10% oral solution   No No   Sig: Take 30 mL (40 mEq total) by mouth daily   predniSONE 10 mg tablet   No No   Sig: Take 4 tablets (40 mg total) by mouth daily for 3  days, THEN 3 tablets (30 mg total) daily for 3 days, THEN 2 tablets (20 mg total) daily for 3 days, THEN 1 tablet (10 mg total) daily for 3 days, THEN 0.5 tablets (5 mg total) daily for 3 days.   rosuvastatin (CRESTOR) 40 MG tablet  Spouse/Significant Other, Self No No   Sig: TAKE 1 TABLET DAILY   Patient taking differently: Take 40 mg by mouth daily   sodium chloride (OCEAN) 0.65 % nasal spray  Self, Spouse/Significant Other No No   Si spray into each nostril every hour as needed for congestion      Facility-Administered Medications: None       Past Medical History:   Diagnosis Date    Abnormal ECG  OR     Alcoholism ([facility]) 1984    sober 38 years    Arthritis     beginning    Bilateral carotid artery stenosis     Callus     Cancer ([facility])     skin    Chronic diastolic heart failure ([facility])     Chronic ischemic heart disease     Chronic kidney disease     stage 3    Colon polyp     COPD (chronic obstructive pulmonary disease) ([facility])     Coronary artery disease     hx stents, MI, PCI    COVID 2021    COVID 2023    CPAP (continuous positive airway pressure) dependence     Disease of thyroid gland     nodules    Emphysema of lung ([facility])     started    Hearing loss     History of transfusion     Hyperlipidemia     Hypertension     Intracranial aneurysm 2023    Lung nodule     x rays    MI (myocardial infarction) ([facility])     Myocardial infarction ([facility])     Pneumonia     Pneumothorax 2023    collapsed lung    Prostate cancer ([facility])     RSV (respiratory syncytial virus infection) 10/2022    S/P carotid endarterectomy     Shortness of breath     O2 2 l/nc PRN    Sleep apnea     Sleep apnea, obstructive     Stented coronary artery        Past Surgical History:   Procedure Laterality Date    APPENDECTOMY      CARDIAC CATHETERIZATION  2021    left main with no significant disease, proximal LAD with 10% stenosis at the site of prior stent, left circumflex artery with  minimal luminal irregularities mid RCA with 50% stenosis at site of prior stent and PL segment with distal disease supplied by collaterals from the distal circumflex with no significant CAD requiring revascularization at that time.    CATARACT EXTRACTION, BILATERAL Bilateral     COLONOSCOPY      CORONARY ANGIOPLASTY WITH STENT PLACEMENT      RCA    CORONARY ANGIOPLASTY WITH STENT PLACEMENT      RCA    CORONARY ANGIOPLASTY WITH STENT PLACEMENT      LAD    EYE SURGERY      CT BX PROSTATE STRTCTC SATURATION SAMPLING IMG GID N/A 2022    Procedure: TRANSPERINEAL MRI FUSION  BIOPSY PROSTATE;  Surgeon: Mele Patel MD;  Location: [facility];  Service: Urology    CT NEUROPLASTY &/TRANSPOS MEDIAN NRV CARPAL TUNNE Left 2022    Procedure: RELEASE CARPAL TUNNEL;  Surgeon: Matty Mcgowan MD;  Location: [facility];  Service: Orthopedics    CT NEUROPLASTY &/TRANSPOSITION ULNAR NERVE ELBOW Left 2022    Procedure: RELEASE CUBITAL TUNNEL;  Surgeon: Matty Mcgowan MD;  Location: [facility];  Service: Orthopedics    CT NEUROPLASTY &/TRANSPOSITION ULNAR NERVE WRIST Left 2022    Procedure: GUYON'S CANAL RELEASE;  Surgeon: Matty Mcgowan MD;  Location: [facility];  Service: Orthopedics    SKIN CANCER EXCISION  2012    chin-per pt, basal cell  also right ankle    TONSILLECTOMY  no       Family History   Problem Relation Age of Onset    Heart attack Mother     Dementia Mother     Heart failure Mother          2006    Heart disease Mother         heart attacks (2)    No Known Problems Father      I have reviewed and agree with the history as documented.    E-Cigarette/Vaping    E-Cigarette Use Never User      E-Cigarette/Vaping Substances    Nicotine No     THC No     CBD No     Flavoring No     Other No     Unknown No      Social History     Tobacco Use    Smoking status: Former     Current packs/day: 0.00     Average packs/day: 2.0 packs/day for 35.5 years (71.0 ttl pk-yrs)     Types:  Cigarettes     Start date: 1960     Quit date: 1995     Years since quittin.5    Smokeless tobacco: Never    Tobacco comments:     stopped smoking the day i had a heart attack   Vaping Use    Vaping status: Never Used   Substance Use Topics    Alcohol use: Not Currently    Drug use: Never       Review of Systems   Constitutional:  Positive for chills and diaphoresis. Negative for fever.   Respiratory:  Positive for cough, chest tightness and shortness of breath.    Cardiovascular:  Positive for chest pain, palpitations and leg swelling.       Physical Exam  Physical Exam  Constitutional:       General: He is in acute distress.      Appearance: He is well-developed. He is ill-appearing.   HENT:      Head: Normocephalic and atraumatic.   Eyes:      Conjunctiva/sclera: Conjunctivae normal.      Pupils: Pupils are equal, round, and reactive to light.   Cardiovascular:      Rate and Rhythm: Regular rhythm. Tachycardia present.   Pulmonary:      Effort: Tachypnea, accessory muscle usage and respiratory distress present.      Breath sounds: Decreased breath sounds and wheezing present.   Abdominal:      General: Bowel sounds are normal.      Palpations: Abdomen is soft.   Musculoskeletal:         General: Normal range of motion.      Cervical back: Normal range of motion and neck supple.      Right lower leg: Edema present.      Left lower leg: Edema present.   Skin:     General: Skin is warm and dry.   Neurological:      Mental Status: He is alert and oriented to person, place, and time.   Psychiatric:         Behavior: Behavior normal.         Thought Content: Thought content normal.         Judgment: Judgment normal.         Vital Signs  ED Triage Vitals   Temperature Pulse Respirations Blood Pressure SpO2   24 0458 24 0219 24 0219 24 0219 24 0219   98.1 °F (36.7 °C) 104 22 128/76 (!) 68 %      Temp src Heart Rate Source Patient Position - Orthostatic VS BP Location FiO2 (%)    -- -- 01/14/24 0219 01/14/24 0219 --     Lying Left arm       Pain Score       01/14/24 0500       3           Vitals:    01/14/24 0219 01/14/24 0458   BP: 128/76 104/68   Pulse: 104 102   Patient Position - Orthostatic VS: Lying          Visual Acuity      ED Medications  Medications   sodium chloride (PF) 0.9 % injection 3 mL (has no administration in time range)   ALPRAZolam (XANAX) tablet 0.25 mg (has no administration in time range)   aspirin chewable tablet 81 mg (has no administration in time range)   cholecalciferol (VITAMIN D3) tablet 1,000 Units (has no administration in time range)   dextromethorphan-guaiFENesin (ROBITUSSIN DM) oral syrup 5 mL (has no administration in time range)   Empagliflozin (JARDIANCE) tablet 10 mg (has no administration in time range)   famotidine (PEPCID) tablet 20 mg (has no administration in time range)   fluticasone (FLONASE) 50 mcg/act nasal spray 1 spray (has no administration in time range)   furosemide (LASIX) tablet 20 mg (has no administration in time range)   gabapentin (NEURONTIN) capsule 300 mg (has no administration in time range)   HYDROcodone-acetaminophen (NORCO) 5-325 mg per tablet 1 tablet (has no administration in time range)   methocarbamol (ROBAXIN) tablet 500 mg (has no administration in time range)   metoprolol succinate (TOPROL-XL) 24 hr tablet 12.5 mg (has no administration in time range)   pantoprazole (PROTONIX) EC tablet 20 mg (20 mg Oral Given 1/14/24 8358)   atorvastatin (LIPITOR) tablet 80 mg (has no administration in time range)   sodium chloride (OCEAN) 0.65 % nasal spray 1 spray (has no administration in time range)   potassium chloride oral solution 40 mEq (has no administration in time range)   sodium chloride 0.9 % bolus 1 mL (1 mL Intravenous Not Given 1/14/24 0041)   predniSONE tablet 40 mg (has no administration in time range)     Followed by   predniSONE tablet 30 mg (has no administration in time range)     Followed by   predniSONE  tablet 20 mg (has no administration in time range)     Followed by   predniSONE tablet 10 mg (has no administration in time range)     Followed by   predniSONE tablet 5 mg (has no administration in time range)   azithromycin (ZITHROMAX) 500 mg in sodium chloride 0.9 % 250 mL IVPB (has no administration in time range)   ondansetron (ZOFRAN) injection 4 mg (has no administration in time range)   acetaminophen (TYLENOL) tablet 650 mg (650 mg Oral Given 1/14/24 0623)   cefepime (MAXIPIME) injection 1,000 mg (has no administration in time range)   enoxaparin (LOVENOX) subcutaneous injection 40 mg (has no administration in time range)   levalbuterol (XOPENEX) inhalation solution 1.25 mg (has no administration in time range)   calcium carbonate (TUMS) chewable tablet 1,000 mg (1,000 mg Oral Given 1/14/24 0623)   budesonide (PULMICORT) inhalation solution 0.5 mg (has no administration in time range)   formoterol (PERFOROMIST) nebulizer solution 20 mcg (has no administration in time range)   albuterol inhalation solution 10 mg (10 mg Nebulization Given 1/14/24 0233)   ipratropium (ATROVENT) 0.02 % inhalation solution 1 mg (1 mg Nebulization Given 1/14/24 0233)   sodium chloride 0.9 % inhalation solution 12 mL (12 mL Nebulization Given 1/14/24 0233)   sodium chloride 0.9 % bolus 250 mL (250 mL Intravenous New Bag 1/14/24 0251)   cefepime (MAXIPIME) IVPB (premix in dextrose) 2,000 mg 50 mL (2,000 mg Intravenous New Bag 1/14/24 0418)   azithromycin (ZITHROMAX) 500 mg in sodium chloride 0.9 % 250 mL IVPB (500 mg Intravenous New Bag 1/14/24 0522)       Diagnostic Studies  Results Reviewed       Procedure Component Value Units Date/Time    B-Type Natriuretic Peptide(BNP) [[id number]]  (Abnormal) Collected: 01/14/24 0217    Lab Status: Final result Specimen: Blood from Arm, Right Updated: 01/14/24 0646     BNP 3,360 pg/mL     Procalcitonin [[id number]]  (Normal) Collected: 01/14/24 4771    Lab Status: Final result Specimen: Blood  from Arm, Left Updated: 01/14/24 0529     Procalcitonin 0.09 ng/ml     HS Troponin I 2hr [[id number]]  (Normal) Collected: 01/14/24 0421    Lab Status: Final result Specimen: Blood from Arm, Left Updated: 01/14/24 0528     hs TnI 2hr 37 ng/L      Delta 2hr hsTnI -13 ng/L     HS Troponin I 4hr [[id number]]  (Normal) Collected: 01/14/24 0421    Lab Status: Final result Specimen: Blood from Arm, Left Updated: 01/14/24 0526     hs TnI 4hr 38 ng/L      Delta 4hr hsTnI -12 ng/L     Protime-INR [[id number]]  (Abnormal) Collected: 01/14/24 0421    Lab Status: Final result Specimen: Blood from Arm, Left Updated: 01/14/24 0522     Protime 15.1 seconds      INR 1.18    APTT [[id number]]  (Normal) Collected: 01/14/24 0421    Lab Status: Final result Specimen: Blood from Arm, Left Updated: 01/14/24 0522     PTT 30 seconds     Lactic acid [[id number]]  (Normal) Collected: 01/14/24 0421    Lab Status: Final result Specimen: Blood from Arm, Left Updated: 01/14/24 0520     LACTIC ACID 0.7 mmol/L     Narrative:      Result may be elevated if tourniquet was used during collection.    Blood culture #2 [[id number]] Collected: 01/14/24 0421    Lab Status: In process Specimen: Blood from Arm, Left Updated: 01/14/24 0456    Blood culture #1 [[id number]] Collected: 01/14/24 0421    Lab Status: In process Specimen: Blood from Arm, Left Updated: 01/14/24 0456    Strep Pneumoniae, Urine [[id number]]     Lab Status: No result Specimen: Urine     Legionella antigen, Urine [[id number]]     Lab Status: No result Specimen: Urine     FLU/RSV/COVID - if FLU/RSV clinically relevant [[id number]]  (Normal) Collected: 01/14/24 0317    Lab Status: Final result Specimen: Nares from Nose Updated: 01/14/24 0414     SARS-CoV-2 Negative     INFLUENZA A PCR Negative     INFLUENZA B PCR Negative     RSV PCR Negative    Narrative:      FOR PEDIATRIC PATIENTS - copy/paste COVID Guidelines URL to browser:  [email or web address]    SARS-CoV-2 assay is a Nucleic Acid Amplification assay intended for the  qualitative detection of nucleic acid from SARS-CoV-2 in nasopharyngeal  swabs. Results are for the presumptive identification of SARS-CoV-2 RNA.    Positive results are indicative of infection with SARS-CoV-2, the virus  causing COVID-19, but do not rule out bacterial infection or co-infection  with other viruses. Laboratories within the [place] and its  territories are required to report all positive results to the appropriate  public health authorities. Negative results do not preclude SARS-CoV-2  infection and should not be used as the sole basis for treatment or other  patient management decisions. Negative results must be combined with  clinical observations, patient history, and epidemiological information.  This test has not been FDA cleared or approved.    This test has been authorized by FDA under an Emergency Use Authorization  (EUA). This test is only authorized for the duration of time the  declaration that circumstances exist justifying the authorization of the  emergency use of an in vitro diagnostic tests for detection of SARS-CoV-2  virus and/or diagnosis of COVID-19 infection under section 564(b)(1) of  the Act, 21 U.S.C. 360bbb-3(b)(1), unless the authorization is terminated  or revoked sooner. The test has been validated but independent review by FDA  and CLIA is pending.    Test performed using [employer] GeneXpert: This RT-PCR assay targets N2,  a region unique to SARS-CoV-2. A conserved region in the E-gene was chosen  for pan-Sarbecovirus detection which includes SARS-CoV-2.    According to CMS-2020-01-R, this platform meets the definition of high-throughput technology.    Sputum culture and Gram stain [[id number]] Collected: 01/14/24 0318    Lab Status: In process Specimen: Expectorated Sputum Updated: 01/14/24 0321    TSH, 3rd generation  [[id number]]  (Normal) Collected: 01/14/24 0217    Lab Status: Final result Specimen: Blood from Arm, Right Updated: 01/14/24 0313     TSH 3RD GENERATON 1.391 uIU/mL     HS Troponin 0hr (reflex protocol) [[id number]]  (Abnormal) Collected: 01/14/24 0217    Lab Status: Final result Specimen: Blood from Arm, Right Updated: 01/14/24 0304     hs TnI 0hr 50 ng/L     Comprehensive metabolic panel [[id number]]  (Abnormal) Collected: 01/14/24 0217    Lab Status: Final result Specimen: Blood from Arm, Right Updated: 01/14/24 0257     Sodium 139 mmol/L      Potassium 4.4 mmol/L      Chloride 101 mmol/L      CO2 30 mmol/L      ANION GAP 8 mmol/L      BUN 18 mg/dL      Creatinine 1.65 mg/dL      Glucose 152 mg/dL      Calcium 9.0 mg/dL      AST 28 U/L      ALT 22 U/L      Alkaline Phosphatase 71 U/L      Total Protein 5.9 g/dL      Albumin 3.7 g/dL      Total Bilirubin 0.68 mg/dL      eGFR 38 ml/min/1.73sq m     Narrative:      National Kidney Disease Foundation guidelines for Chronic Kidney Disease (CKD):     Stage 1 with normal or high GFR (GFR > 90 mL/min/1.73 square meters)    Stage 2 Mild CKD (GFR = 60-89 mL/min/1.73 square meters)    Stage 3A Moderate CKD (GFR = 45-59 mL/min/1.73 square meters)    Stage 3B Moderate CKD (GFR = 30-44 mL/min/1.73 square meters)    Stage 4 Severe CKD (GFR = 15-29 mL/min/1.73 square meters)    Stage 5 End Stage CKD (GFR <15 mL/min/1.73 square meters)  Note: GFR calculation is accurate only with a steady state creatinine    CBC and differential [[id number]]  (Abnormal) Collected: 01/14/24 0217    Lab Status: Final result Specimen: Blood from Arm, Right Updated: 01/14/24 0242     WBC 11.44 Thousand/uL      RBC 4.32 Million/uL      Hemoglobin 13.7 g/dL      Hematocrit 45.7 %       fL      MCH 31.7 pg      MCHC 30.0 g/dL      RDW 17.7 %      MPV 10.5 fL      Platelets 181 Thousands/uL      nRBC 0 /100 WBCs      Neutrophils Relative 79 %      Immat GRANS % 0 %      Lymphocytes Relative 9 %       Monocytes Relative 11 %      Eosinophils Relative 1 %      Basophils Relative 0 %      Neutrophils Absolute 8.95 Thousands/µL      Immature Grans Absolute 0.04 Thousand/uL      Lymphocytes Absolute 1.07 Thousands/µL      Monocytes Absolute 1.25 Thousand/µL      Eosinophils Absolute 0.08 Thousand/µL      Basophils Absolute 0.05 Thousands/µL                    X-ray chest 1 view portable    (Results Pending)              Procedures  CriticalCare Time    Date/Time: 1/16/2024 1:38 PM    Performed by: Bryce Gong MD  Authorized by: Bryce Gong MD    Critical care provider statement:     Critical care time (minutes):  30    Critical care time was exclusive of:  Separately billable procedures and treating other patients and teaching time    Critical care was necessary to treat or prevent imminent or life-threatening deterioration of the following conditions:  Circulatory failure, cardiac failure and respiratory failure    Critical care was time spent personally by me on the following activities:  Blood draw for specimens, obtaining history from patient or surrogate, development of treatment plan with patient or surrogate, evaluation of patient's response to treatment, examination of patient, interpretation of cardiac output measurements, ordering and performing treatments and interventions, ordering and review of laboratory studies, ordering and review of radiographic studies, re-evaluation of patient's condition, review of old charts, ventilator management and discussions with consultants    I assumed direction of critical care for this patient from another provider in my specialty: no             ED Course                               SBIRT 20yo+      Flowsheet Row Most Recent Value   Initial Alcohol Screen: US AUDIT-C     1. How often do you have a drink containing alcohol? 0 Filed at: 01/14/2024 0221   2. How many drinks containing alcohol do you have on a typical day you are drinking?  0 Filed at:  01/14/2024 0221   3a. Male UNDER 65: How often do you have five or more drinks on one occasion? 0 Filed at: 01/14/2024 0221   3b. FEMALE Any Age, or MALE 65+: How often do you have 4 or more drinks on one occassion? 0 Filed at: 01/14/2024 0221   Audit-C Score 0 Filed at: 01/14/2024 0221   CRISTOPHER: How many times in the past year have you...    Used an illegal drug or used a prescription medication for non-medical reasons? Never Filed at: 01/14/2024 0221                      Medical Decision Making  78 year old man who presented extremely unwell. Initially thought more CAD as primary disease. I think patients severe hypoxia (spO2 in the high 60s) brought about anginal symptoms. As soon as his oxygenation improved the chest pain resolved. Started on bipap and then decreased to cpap. Cardiology contacted who agreed with likely hypoxia cause, and felt it was appropriate to keep him at our facility. Initially ordered nitroglycerin but patients pressures became too low before he received it. Ordered levophed but his pressures improved before he needed it. Greatly improved at time of admission. Him and wife state understanding and agreement with the plan. He states he wants compressions and all interventions except intubation.     Problems Addressed:  Chest pain, unspecified type: acute illness or injury  COPD exacerbation (HCC): acute illness or injury    Amount and/or Complexity of Data Reviewed  Independent Historian: spouse and EMS  External Data Reviewed: labs, radiology, ECG and notes.  Labs: ordered.  Radiology: ordered.  ECG/medicine tests: ordered and independent interpretation performed.     Details: Sinus tachycardia, no st elevations    Risk  OTC drugs.  Prescription drug management.  Drug therapy requiring intensive monitoring for toxicity.  Decision regarding hospitalization.             Disposition  Final diagnoses:   COPD exacerbation (HCC)   Chest pain, unspecified type     Time reflects when diagnosis was  documented in both MDM as applicable and the Disposition within this note       Time User Action Codes Description Comment    1/14/2024  3:23 AM Bryce Gong [J44.1] COPD exacerbation (HCC)     1/14/2024  3:23 AM Bryce Gong [R07.9] Chest pain, unspecified type     1/14/2024  4:10 AM Bhavna Larry [J96.21] Acute and chronic respiratory failure with hypoxia (HCC)     1/14/2024  4:10 AM Bhavna Larry [J18.9] Right lower lobe pneumonia           ED Disposition       ED Disposition   Admit    Condition   Stable    Date/Time   Sun Jan 14, 2024 0323    Comment   Case was discussed with bhavna and the patient's admission status was agreed to be Admission Status: inpatient status to the service of Dr. palm .               Follow-up Information    None         Current Discharge Medication List        CONTINUE these medications which have NOT CHANGED    Details   aspirin 81 mg chewable tablet 81 mg daily at bedtime      ALPRAZolam (XANAX) 0.25 mg tablet Take 1 tablet (0.25 mg total) by mouth daily at bedtime as needed for anxiety  Qty: 15 tablet, Refills: 0    Associated Diagnoses: Anxiety      budesonide (PULMICORT) 0.5 mg/2 mL nebulizer solution Take 2 mL (0.5 mg total) by nebulization 2 (two) times a day Rinse mouth after use.  Qty: 180 mL, Refills: 3    Associated Diagnoses: Chronic obstructive pulmonary disease, unspecified COPD type ([facility])      cholecalciferol (VITAMIN D3) 1,000 units tablet Take 1,000 Units by mouth daily      dextromethorphan-guaiFENesin (ROBITUSSIN DM)  mg/5 mL syrup Take 5 mL by mouth 3 (three) times a day as needed for cough  Qty: 354 mL, Refills: 0    Associated Diagnoses: COPD with exacerbation ([facility])      Empagliflozin (Jardiance) 10 MG TABS tablet Take 1 tablet (10 mg total) by mouth every morning  Qty: 90 tablet, Refills: 5    Associated Diagnoses: HFrEF (heart failure with reduced ejection fraction) ([facility])      famotidine (PEPCID) 20 mg tablet TAKE 1  TABLET BY MOUTH DAILY AT  BEDTIME  Qty: 100 tablet, Refills: 2    Comments: Requesting 1 year supply  Associated Diagnoses: Gastroesophageal reflux disease, unspecified whether esophagitis present      fluticasone (FLONASE) 50 mcg/act nasal spray 1 spray into each nostril 2 (two) times a day  Qty: 9.9 mL, Refills: 0    Associated Diagnoses: COPD exacerbation ([facility])      formoterol (PERFOROMIST) 20 MCG/2ML nebulizer solution Take 2 mL (20 mcg total) by nebulization 2 (two) times a day  Qty: 180 mL, Refills: 0    Associated Diagnoses: Chronic obstructive pulmonary disease, unspecified COPD type ([facility])      furosemide (LASIX) 40 mg tablet Take 0.5 tablets (20 mg total) by mouth daily  Qty: 90 tablet, Refills: 2    Associated Diagnoses: Chronic ischemic heart disease      gabapentin (NEURONTIN) 300 mg capsule Take 1 capsule (300 mg total) by mouth daily at bedtime  Qty: 90 capsule, Refills: 0    Associated Diagnoses: Chronic pain syndrome; Intercostal neuralgia; Chronic right-sided thoracic back pain      Humidifier MISC Use continuous  Qty: 1 each, Refills: 0    Associated Diagnoses: Chronic respiratory failure with hypoxia ([facility])      HYDROcodone-acetaminophen (Norco) 5-325 mg per tablet Take 1 tablet by mouth every 6 (six) hours as needed for pain Max Daily Amount: 4 tablets  Qty: 15 tablet, Refills: 0    Associated Diagnoses: Costochondral chest pain      ipratropium-albuterol (DUO-NEB) 0.5-2.5 mg/3 mL nebulizer solution       methocarbamol (ROBAXIN) 500 mg tablet Take 1 tablet (500 mg total) by mouth 3 (three) times a day as needed for muscle spasms  Qty: 30 tablet, Refills: 0    Associated Diagnoses: Chronic right-sided thoracic back pain      metoprolol succinate (TOPROL-XL) 25 mg 24 hr tablet Take 0.5 tablets (12.5 mg total) by mouth 2 (two) times a day  Qty: 30 tablet, Refills: 0    Associated Diagnoses: Acute on chronic right-sided congestive heart failure (HCC)      naloxone (NARCAN) 4 mg/0.1 mL nasal spray  Administer 1 spray into a nostril. If no response after 2-3 minutes, give another dose in the other nostril using a new spray.  Qty: 1 each, Refills: 1    Associated Diagnoses: Costochondral chest pain      nystatin (MYCOSTATIN) 500,000 units/5 mL suspension 5 ml orally (swish and spit/swallow) every four hours while awake  Qty: 180 mL, Refills: 0    Associated Diagnoses: Oral candidiasis      omeprazole (PriLOSEC) 20 mg delayed release capsule take 1 capsule by mouth once daily  Qty: 30 capsule, Refills: 3    Associated Diagnoses: Gastroesophageal reflux disease, unspecified whether esophagitis present      oxygen gas Inhale 2 L/min continuous 2LPM at rest and 3-4 LPM with activity per D Cedric FANG notes      potassium chloride 10% oral solution Take 30 mL (40 mEq total) by mouth daily    Associated Diagnoses: Hypokalemia; HFrEF (heart failure with reduced ejection fraction) (HCC)      predniSONE 10 mg tablet Take 4 tablets (40 mg total) by mouth daily for 3 days, THEN 3 tablets (30 mg total) daily for 3 days, THEN 2 tablets (20 mg total) daily for 3 days, THEN 1 tablet (10 mg total) daily for 3 days, THEN 0.5 tablets (5 mg total) daily for 3 days.  Qty: 32 tablet, Refills: 0    Associated Diagnoses: Chronic obstructive pulmonary disease, unspecified COPD type (HCC)      rosuvastatin (CRESTOR) 40 MG tablet TAKE 1 TABLET DAILY  Qty: 100 tablet, Refills: 3    Associated Diagnoses: Hyperlipidemia, unspecified hyperlipidemia type      sodium chloride (OCEAN) 0.65 % nasal spray 1 spray into each nostril every hour as needed for congestion  Qty: 30 mL, Refills: 0    Associated Diagnoses: COPD exacerbation (HCC)             No discharge procedures on file.    PDMP Review         Value Time User    PDMP Reviewed  Yes 11/22/2023  8:01 AM Sarahy Clifford DO            ED Provider  Electronically Signed by             Bryce Gong MD  01/16/24 0755

## 2024-01-14 NOTE — RESPIRATORY THERAPY NOTE
01/14/24 0223   Respiratory Assessment   Resp Comments pt placed on bipap for increased work of breathing   O2 Device bipap   Non-Invasive Information   O2 Interface Device Full face mask  (medium)   Non-Invasive Ventilation Mode BiPAP   $ Continous NIV Initial   SpO2 99 %   $ Pulse Oximetry Spot Check Charge Completed   Non-Invasive Settings   IPAP (cm) 14 cm   EPAP (cm) 6 cm   Rate (Set) 12   FiO2 (%) 60   Rise Time 3   Inspiratory Time (Set) 1   Non-Invasive Readings   Skin Intervention Skin intact   Total Rate 22   Vt (mL) (Mech) 502   MV (Mech) 10.9   Peak Pressure (Obs) 14   Leak (lpm) 4   Non-Invasive Alarms   Insp Pressure High (cm H20) 25   Insp Pressure Low (cm H20) 10   Low Insp Pressure Time (sec) 20 sec   MV Low (L/min) 3   Vt High (mL) 2300   Vt Low (mL) 250   High Resp Rate (BPM) 45 BPM   Low Resp Rate (BPM) 10 BPM   Apnea Interval (sec) 20

## 2024-01-14 NOTE — ASSESSMENT & PLAN NOTE
Wt Readings from Last 3 Encounters:   01/08/24 78 kg (172 lb)   01/04/24 77.6 kg (171 lb)   12/29/23 79.9 kg (176 lb 1.6 oz)     Continue home medications  Monitor weight  Had 500ml IVF in ED, hold on further fluids

## 2024-01-14 NOTE — CONSULTS
"[facility]  Consult  Name: Seun Alva 78 y.o. male I MRN: [id number]  Unit/Bed#: -01 I Date of Admission: 1/14/2024   Date of Service: 1/14/2024 I Hospital Day: 0    Inpatient consult to Cardiology  Consult performed by: Phillip Eng DO  Consult ordered by: Jordana Palomo MD          Assessment/Plan   Elevated troponin  Assessment & Plan  Not consistent with ACS, likely demand spill secondary to exac COPD/Emphysema/severe hypoxia    Chronic systolic congestive heart failure (HCC)  Assessment & Plan  Wt Readings from Last 3 Encounters:   01/14/24 78 kg (171 lb 15.3 oz)   01/08/24 78 kg (172 lb)   01/04/24 77.6 kg (171 lb)     Mild decompensation, seems at baseline today, can transition back to oral diuretic regimen in AM  HF note reviewed, options moving forward appear limited, patient aware  BP prohibits further GDMT (ie Entresto/Aldactone) at this time        * Acute and chronic respiratory failure with hypoxia (HCC)  Assessment & Plan  ? Pneumonia trigger  Much improved p ER/SLIM care with CPAP/IV Abx and Lasix    Right lower lobe pneumonia  Assessment & Plan  Per SLIM      No further acute cardiac workup advised at this time  Will follow with you peripherally, call or TT if needed  FU with primary Cardiologist on DC    Chief Complaint:  \"I couldn't breathe, and I had chest pain\"   History of Present Illness:  79 yo presented with severe SOB and chest pain, with marked hypoxia (upper 60s sat) on arrival,quickly treated with CPAP/o2. Had Cp initially reported as severe, but this resolved quickly with the aforementioned treatment and has not recurred.  Patient was asleep on arrival, easily aroused.  Very [place] but he has a system with his phone that allowed us to communicate well.  He is a very good historian, we discussed his last visits with Dr Shelley and Dr Powell.  Troponins have been flat.  BNP elevated.  EKG wo acute ST changes.  Says he feels \"much better\".  pCXR by my " interp appears to show a RLL infiltrate > CHF findings, formal report pending.    Review of Systems: a 12 point review of systems was conducted and is negative except for as mentioned in the HPI or as below.   Review of Systems   Constitutional: Negative for chills, diaphoresis, malaise/fatigue and weight gain.   HENT:  Negative for nosebleeds and stridor.    Eyes:  Negative for double vision, vision loss in left eye, vision loss in right eye and visual disturbance.   Cardiovascular:  Positive for chest pain, dyspnea on exertion and leg swelling. Negative for claudication, cyanosis, irregular heartbeat, near-syncope, orthopnea, palpitations, paroxysmal nocturnal dyspnea and syncope.   Respiratory:  Positive for shortness of breath and wheezing. Negative for cough and snoring.    Endocrine: Negative for polydipsia, polyphagia and polyuria.   Hematologic/Lymphatic: Negative for bleeding problem. Does not bruise/bleed easily.   Skin:  Negative for flushing and rash.   Musculoskeletal:  Negative for falls and myalgias.   Gastrointestinal:  Negative for abdominal pain, heartburn, hematemesis, hematochezia, melena and nausea.   Genitourinary:  Negative for hematuria.   Neurological:  Positive for light-headedness. Negative for brief paralysis, dizziness, focal weakness, headaches, loss of balance and vertigo.   Psychiatric/Behavioral:  Negative for altered mental status and substance abuse.    Allergic/Immunologic: Negative for hives.       Past Medical and Surgical History:  Past Medical History:   Diagnosis Date    Abnormal ECG 2021 OR 2022    Alcoholism (HCC) 1984    sober 38 years    Arthritis 2008    beginning    Bilateral carotid artery stenosis     Callus     Cancer (HCC)     skin    Chronic diastolic heart failure (HCC)     Chronic ischemic heart disease     Chronic kidney disease     stage 3    Colon polyp     COPD (chronic obstructive pulmonary disease) (HCC)     Coronary artery disease     hx stents, MI, PCI     COVID 11/2021    COVID 12/01/2023    CPAP (continuous positive airway pressure) dependence     Disease of thyroid gland 2017    nodules    Emphysema of lung (HCC) 1995    started    Hearing loss     History of transfusion 1995    Hyperlipidemia     Hypertension     Intracranial aneurysm 04/25/2023    Lung nodule 2023    x rays    MI (myocardial infarction) (HCC) 1995    Myocardial infarction (HCC) 1995    Pneumonia     Pneumothorax 02/20/2023    collapsed lung    Prostate cancer (HCC)     RSV (respiratory syncytial virus infection) 10/2022    S/P carotid endarterectomy     Shortness of breath     O2 2 l/nc PRN    Sleep apnea     Sleep apnea, obstructive     Stented coronary artery      Past Surgical History:   Procedure Laterality Date    APPENDECTOMY      CARDIAC CATHETERIZATION  03/18/2021    left main with no significant disease, proximal LAD with 10% stenosis at the site of prior stent, left circumflex artery with minimal luminal irregularities mid RCA with 50% stenosis at site of prior stent and PL segment with distal disease supplied by collaterals from the distal circumflex with no significant CAD requiring revascularization at that time.    CATARACT EXTRACTION, BILATERAL Bilateral     COLONOSCOPY      CORONARY ANGIOPLASTY WITH STENT PLACEMENT  1999    RCA    CORONARY ANGIOPLASTY WITH STENT PLACEMENT  2001    RCA    CORONARY ANGIOPLASTY WITH STENT PLACEMENT  2003    LAD    EYE SURGERY      MN BX PROSTATE STRTCTC SATURATION SAMPLING IMG GID N/A 09/13/2022    Procedure: TRANSPERINEAL MRI FUSION  BIOPSY PROSTATE;  Surgeon: Mele Patel MD;  Location: [facility];  Service: Urology    MN NEUROPLASTY &/TRANSPOS MEDIAN NRV CARPAL TUNNE Left 07/22/2022    Procedure: RELEASE CARPAL TUNNEL;  Surgeon: Matty Mcgowan MD;  Location: [facility];  Service: Orthopedics    MN NEUROPLASTY &/TRANSPOSITION ULNAR NERVE ELBOW Left 07/22/2022    Procedure: RELEASE CUBITAL TUNNEL;  Surgeon: Matty Mcgowan MD;  Location: [facility];  Service: Orthopedics    CT NEUROPLASTY &/TRANSPOSITION ULNAR NERVE WRIST Left 2022    Procedure: GUYON'S CANAL RELEASE;  Surgeon: Matty Mcgowan MD;  Location: [facility];  Service: Orthopedics    SKIN CANCER EXCISION      chin-per pt, basal cell  also right ankle    TONSILLECTOMY  no     Social History     Substance and Sexual Activity   Alcohol Use Not Currently     Social History     Substance and Sexual Activity   Drug Use Never     Social History     Tobacco Use   Smoking Status Former    Current packs/day: 0.00    Average packs/day: 2.0 packs/day for 35.5 years (71.0 ttl pk-yrs)    Types: Cigarettes    Start date: 1960    Quit date: 1995    Years since quittin.5   Smokeless Tobacco Never   Tobacco Comments    stopped smoking the day i had a heart attack       Family History:  Family History   Problem Relation Age of Onset    Heart attack Mother     Dementia Mother     Heart failure Mother          2006    Heart disease Mother         heart attacks (2)    No Known Problems Father        Medication:  Medications Prior to Admission   Medication    aspirin 81 mg chewable tablet    ALPRAZolam (XANAX) 0.25 mg tablet    budesonide (PULMICORT) 0.5 mg/2 mL nebulizer solution    cholecalciferol (VITAMIN D3) 1,000 units tablet    dextromethorphan-guaiFENesin (ROBITUSSIN DM)  mg/5 mL syrup    Empagliflozin (Jardiance) 10 MG TABS tablet    famotidine (PEPCID) 20 mg tablet    fluticasone (FLONASE) 50 mcg/act nasal spray    formoterol (PERFOROMIST) 20 MCG/2ML nebulizer solution    furosemide (LASIX) 40 mg tablet    gabapentin (NEURONTIN) 300 mg capsule    Humidifier MISC    HYDROcodone-acetaminophen (Norco) 5-325 mg per tablet    ipratropium-albuterol (DUO-NEB) 0.5-2.5 mg/3 mL nebulizer solution    methocarbamol (ROBAXIN) 500 mg tablet    metoprolol succinate (TOPROL-XL) 25 mg 24 hr tablet    naloxone (NARCAN) 4 mg/0.1 mL nasal spray    nystatin (MYCOSTATIN) 500,000 units/5 mL  suspension    omeprazole (PriLOSEC) 20 mg delayed release capsule    oxygen gas    potassium chloride 10% oral solution    predniSONE 10 mg tablet    rosuvastatin (CRESTOR) 40 MG tablet    sodium chloride (OCEAN) 0.65 % nasal spray      Current Facility-Administered Medications   Medication Dose Route Frequency Provider Last Rate Last Admin    acetaminophen (TYLENOL) tablet 650 mg  650 mg Oral Q6H PRN Christen Larry PA-C   650 mg at 01/14/24 0623    ALPRAZolam (XANAX) tablet 0.25 mg  0.25 mg Oral HS PRN Christen Larry PA-C        aspirin chewable tablet 81 mg  81 mg Oral HS Christen Larry PA-C        atorvastatin (LIPITOR) tablet 80 mg  80 mg Oral Daily With Dinner Christen Larry PA-C        [START ON 1/15/2024] azithromycin (ZITHROMAX) 500 mg in sodium chloride 0.9 % 250 mL IVPB  500 mg Intravenous Q24H Christen Larry PA-C        budesonide (PULMICORT) inhalation solution 0.5 mg  0.5 mg Nebulization BID Tesha Avila DO   0.5 mg at 01/14/24 0708    calcium carbonate (TUMS) chewable tablet 1,000 mg  1,000 mg Oral BID PRN Christen Larry PA-C   1,000 mg at 01/14/24 0623    cefepime (MAXIPIME) injection 1,000 mg  1,000 mg Intravenous Q12H Christen Larry PA-C        cholecalciferol (VITAMIN D3) tablet 1,000 Units  1,000 Units Oral Daily Christen Larry PA-C   1,000 Units at 01/14/24 0827    dextromethorphan-guaiFENesin (ROBITUSSIN DM) oral syrup 5 mL  5 mL Oral TID PRN Christen Larry PA-C        Empagliflozin (JARDIANCE) tablet 10 mg  10 mg Oral Daily Christen Larry PA-C   10 mg at 01/14/24 0829    enoxaparin (LOVENOX) subcutaneous injection 40 mg  40 mg Subcutaneous Daily Christen Larry PA-C   40 mg at 01/14/24 0827    famotidine (PEPCID) tablet 20 mg  20 mg Oral HS Christen Larry PA-C        fluticasone (FLONASE) 50 mcg/act nasal spray 1 spray  1 spray Nasal BID Christen Larry PA-C   1 spray  at 01/14/24 0829    formoterol (PERFOROMIST) nebulizer solution 20 mcg  20 mcg Nebulization BID Briseydai Todhe, DO   20 mcg at 01/14/24 0721    furosemide (LASIX) injection 40 mg  40 mg Intravenous BID (diuretic) Jordana Palomo MD        gabapentin (NEURONTIN) capsule 300 mg  300 mg Oral HS Christen Larry PA-C        HYDROcodone-acetaminophen (NORCO) 5-325 mg per tablet 1 tablet  1 tablet Oral Q6H PRN Christen Larry PA-C        levalbuterol (XOPENEX) inhalation solution 1.25 mg  1.25 mg Nebulization TID Tesha Jarrettcynthia DO   1.25 mg at 01/14/24 0708    methocarbamol (ROBAXIN) tablet 500 mg  500 mg Oral TID PRN Christen Larry PA-C        metoprolol succinate (TOPROL-XL) 24 hr tablet 12.5 mg  12.5 mg Oral BID Christen Larry PA-C        ondansetron (ZOFRAN) injection 4 mg  4 mg Intravenous Q6H PRN Christen Larry PA-C        pantoprazole (PROTONIX) EC tablet 20 mg  20 mg Oral Early Morning Christen Larry PA-C   20 mg at 01/14/24 0524    potassium chloride oral solution 40 mEq  40 mEq Oral Daily Christen Larry PA-C   40 mEq at 01/14/24 0827    predniSONE tablet 40 mg  40 mg Oral Daily Christen Larry PA-C   40 mg at 01/14/24 0827    Followed by    [START ON 1/16/2024] predniSONE tablet 30 mg  30 mg Oral Daily Christen Larry PA-C        Followed by    [START ON 1/19/2024] predniSONE tablet 20 mg  20 mg Oral Daily Christen Larry PA-C        Followed by    [START ON 1/22/2024] predniSONE tablet 10 mg  10 mg Oral Daily Christen Larry PA-C        Followed by    [START ON 1/25/2024] predniSONE tablet 5 mg  5 mg Oral Daily Christen Larry PA-C        sodium chloride (OCEAN) 0.65 % nasal spray 1 spray  1 spray Each Nare Q1H PRN Christen Larry PA-C   1 spray at 01/14/24 1052    sodium chloride (PF) 0.9 % injection 3 mL  3 mL Intravenous Q1H PRN Bryce Gong MD        sodium chloride 0.9 % bolus 1 mL   "1 mL Intravenous Once Bryce Gong MD           Allergies:  Allergies   Allergen Reactions    Lisinopril Swelling and Cough    Tetanus Antitoxin Anaphylaxis    Tetanus Toxoid Anaphylaxis and Swelling       Physical Exam:  Vitals: Blood pressure (!) 83/54, pulse 96, temperature (!) 96.7 °F (35.9 °C), temperature source Temporal, resp. rate 16, height 5' 8\" (1.727 m), weight 78 kg (171 lb 15.3 oz), SpO2 96%., Body mass index is 26.15 kg/m².,   Orthostatic Blood Pressures      Flowsheet Row Most Recent Value   Blood Pressure 83/54 filed at 2024 1127   Patient Position - Orthostatic VS Lying filed at 2024 0219          Systolic (24hrs), Av , Min:83 , Max:128   , Diastolic (24hrs), Av, Min:54, Max:76    Physical Exam  Constitutional:       General: He is not in acute distress.     Appearance: He is well-developed. He is not ill-appearing, toxic-appearing or diaphoretic.   HENT:      Head: Normocephalic and atraumatic.   Eyes:      General: No scleral icterus.     Pupils: Pupils are equal, round, and reactive to light.   Neck:      Thyroid: No thyromegaly.      Vascular: No carotid bruit or JVD.   Cardiovascular:      Rate and Rhythm: Normal rate and regular rhythm.      Heart sounds: Normal heart sounds. No murmur heard.     No friction rub. No gallop.   Pulmonary:      Effort: Pulmonary effort is normal. No respiratory distress.      Breath sounds: No stridor. Wheezing and rhonchi present. No rales.   Abdominal:      General: Bowel sounds are normal. There is no distension.      Palpations: Abdomen is soft. There is no mass.      Tenderness: There is no abdominal tenderness.   Musculoskeletal:      Cervical back: Normal range of motion and neck supple.      Right lower leg: Edema present.      Left lower leg: Edema present.      Comments: +1 edema BL LE   Skin:     General: Skin is warm and dry.      Coloration: Skin is not pale.      Findings: No erythema.   Neurological:      Mental Status: He " is alert and oriented to person, place, and time.      Coordination: Coordination normal.   Psychiatric:         Mood and Affect: Mood normal.         Behavior: Behavior normal.         Thought Content: Thought content normal.         Judgment: Judgment normal.         Most Recent Cardiac Imaging: Echo 8/2023    Interpretation Summary         Left Ventricle: Left ventricular cavity size is mildly dilated. Wall thickness is normal. The left ventricular ejection fraction is 35%. Systolic function is moderately reduced in a global manner.    Right Ventricle: Right ventricular cavity size is mildly dilated. Systolic function is mildly reduced. Normal tricuspid annular plane systolic excursion (TAPSE) > 1.7 cm.    Left Atrium: The atrium is mildly dilated.    Aortic Valve: There is aortic valve sclerosis but no stenosis.    Mitral Valve: There is mild regurgitation.    Tricuspid Valve: There is mild regurgitation. The right ventricular systolic pressure is mildly elevated. The estimated right ventricular systolic pressure is 46.00 mmHg.       EKG:   Sinus tachycardia with occasional Premature ventricular complexes  Possible Left atrial enlargement  Borderline ECG  When compared with ECG of 14-JAN-2024 05:27, (unconfirmed)  No significant change was found  Confirmed by Phillip Eng ([id number]) on 1/14/2024 10:36:46 AM       Lab Results:   Troponins:   50, 37, 38  CBC with diff:   Results from last 7 days   Lab Units 01/14/24  0217   WBC Thousand/uL 11.44*   HEMOGLOBIN g/dL 13.7   HEMATOCRIT % 45.7   MCV fL 106*   PLATELETS Thousands/uL 181   RBC Million/uL 4.32   MCH pg 31.7   MCHC g/dL 30.0*   RDW % 17.7*   MPV fL 10.5   NRBC AUTO /100 WBCs 0     CMP:  Results from last 7 days   Lab Units 01/14/24 0217 01/12/24  1605   POTASSIUM mmol/L 4.4 5.3   CHLORIDE mmol/L 101 102   CO2 mmol/L 30 32   BUN mg/dL 18 14   CREATININE mg/dL 1.65* 1.22   CALCIUM mg/dL 9.0 9.1   AST U/L 28  --    ALT U/L 22  --    ALK PHOS U/L 71  --    EGFR  ml/min/1.73sq m 38 56     Lipid Profile:      BNP 3360

## 2024-01-14 NOTE — ED NOTES
Norepi and epi at bedside for emergent use, not being utilized as of yet.     Carri Steward, LEYDA  01/14/24 0256

## 2024-01-14 NOTE — CONSULTS
Pulmonary Medicine-Consultation  eSun Alva 78 y.o. male MRN: [id number]  Unit/Bed#: -01 Encounter: [id number]      Assessment:  Acute on chronic respiratory failure  RLL infiltrate-likely pneumonia/aspiration  Aspiration pneumonia/pneumonitis  Severe COPD/emphysema  Systolic/diastolic CHF  LAMBERTO    Recommendations/discussion:  Current presentation seems to be acute bronchitis aspiration pneumonia/pneumonitis  No signs of severe COPD Reservation  Continue azithromycin/cefepime, complete 5 to 7 days of antibiotics already feeling better  Follow sputum cultures  No need for high-dose steroids, continue prednisone 40 mg for 2 more days/agree with a taper after that  Consider gentle diuresis continue nebulized only treatment  Nebs: Budesonide q12, Xopenex tid, added hypertonic saline nebs  Wean FiO2 for SpO2 above 88%/now at baseline oxygen  CPAP at bedtime and with naps    Outpatient follow-up with pulmonary    Pulmonary will sign off, please do not hesitate to call with any questions      Physician Requesting Consult: Tesha Avila DO    Reason for Consult / Principal Problem: Pneumonia/acute hypoxic respiratory failure    HPI:   78 y.o. male with a h/o CKD, ischemic cardiomyopathy, CAD, chronic diastolic CHF, LAMBERTO/CPAP, severe COPD/emphysema, former tobacco abuse/70-pack-year quit in 1995, chronic hypoxemic respiratory failure.    Recently seen at the pulmonary office 1/8, noted acute exacerbation at that time.  Mended to continue nebulized only treatment.    Presented to the ED 1/14 a.m. with shortness of breath, chest pain/discomfort.  States that it was on the left side/3-4/10.  Also reported nasal congestion.  No recent travel or exposure to sick contact.  Chest x-ray showed new RLL/patchy GGO's.  BNP uptrending 1800 in October/up to 3360, negative COVID/flu/RSV.  Negative procalcitonin and elevated troponin/trended down noted to be in CHEYANNE.    On my assessment, patient is resting in bed.  Reports  improvement of the chest pain, still feeling more chest congestion, states that feels about 85% close to baseline.  No recent travel or exposure to a sick contact.    Inpatient consult to Pulmonology  Consult performed by: Dena Alarcon MD  Consult ordered by: Christen Larry PA-C          Review of Systems  As per hpi, all other systems reviewed and were negative      Studies:    Imaging Studies: I have personally reviewed pertinent films in PACS    EKG, Pathology, and Other Studies: I have personally reviewed pertinent films in PACS    Pulmonary Results (PFTs, PSG): Reviewed    Historical Information   Past Medical History:   Diagnosis Date    Abnormal ECG 2021 OR 2022    Alcoholism (HCC) 1984    sober 38 years    Arthritis 2008    beginning    Bilateral carotid artery stenosis     Callus     Cancer (HCC)     skin    Chronic diastolic heart failure (HCC)     Chronic ischemic heart disease     Chronic kidney disease     stage 3    Colon polyp     COPD (chronic obstructive pulmonary disease) (HCC)     Coronary artery disease     hx stents, MI, PCI    COVID 11/2021    COVID 12/01/2023    CPAP (continuous positive airway pressure) dependence     Disease of thyroid gland 2017    nodules    Emphysema of lung (HCC) 1995    started    Hearing loss     History of transfusion 1995    Hyperlipidemia     Hypertension     Intracranial aneurysm 04/25/2023    Lung nodule 2023    x rays    MI (myocardial infarction) (HCC) 1995    Myocardial infarction (HCC) 1995    Pneumonia     Pneumothorax 02/20/2023    collapsed lung    Prostate cancer (HCC)     RSV (respiratory syncytial virus infection) 10/2022    S/P carotid endarterectomy     Shortness of breath     O2 2 l/nc PRN    Sleep apnea     Sleep apnea, obstructive     Stented coronary artery      Past Surgical History:   Procedure Laterality Date    APPENDECTOMY      CARDIAC CATHETERIZATION  03/18/2021    left main with no significant disease, proximal LAD  with 10% stenosis at the site of prior stent, left circumflex artery with minimal luminal irregularities mid RCA with 50% stenosis at site of prior stent and PL segment with distal disease supplied by collaterals from the distal circumflex with no significant CAD requiring revascularization at that time.    CATARACT EXTRACTION, BILATERAL Bilateral     COLONOSCOPY      CORONARY ANGIOPLASTY WITH STENT PLACEMENT      RCA    CORONARY ANGIOPLASTY WITH STENT PLACEMENT      RCA    CORONARY ANGIOPLASTY WITH STENT PLACEMENT      LAD    EYE SURGERY      AK BX PROSTATE STRTCTC SATURATION SAMPLING IMG GID N/A 2022    Procedure: TRANSPERINEAL MRI FUSION  BIOPSY PROSTATE;  Surgeon: Mele Patel MD;  Location: [facility];  Service: Urology    AK NEUROPLASTY &/TRANSPOS MEDIAN NRV CARPAL TUNNE Left 2022    Procedure: RELEASE CARPAL TUNNEL;  Surgeon: Matty Mcgowan MD;  Location: [facility];  Service: Orthopedics    AK NEUROPLASTY &/TRANSPOSITION ULNAR NERVE ELBOW Left 2022    Procedure: RELEASE CUBITAL TUNNEL;  Surgeon: Matty Mcgowan MD;  Location: [facility];  Service: Orthopedics    AK NEUROPLASTY &/TRANSPOSITION ULNAR NERVE WRIST Left 2022    Procedure: GUYON'S CANAL RELEASE;  Surgeon: Matty Mcgowan MD;  Location: [facility];  Service: Orthopedics    SKIN CANCER EXCISION  2012    chin-per pt, basal cell  also right ankle    TONSILLECTOMY  no     Social History   Social History     Substance and Sexual Activity   Alcohol Use Not Currently     Social History     Substance and Sexual Activity   Drug Use Never     Social History     Tobacco Use   Smoking Status Former    Current packs/day: 0.00    Average packs/day: 2.0 packs/day for 35.5 years (71.0 ttl pk-yrs)    Types: Cigarettes    Start date: 1960    Quit date: 1995    Years since quittin.5   Smokeless Tobacco Never   Tobacco Comments    stopped smoking the day i had a heart attack     E-Cigarette/Vaping     "E-Cigarette Use Never User      E-Cigarette/Vaping Substances    Nicotine No     THC No     CBD No     Flavoring No     Other No     Unknown No          Family History:   Family History   Problem Relation Age of Onset    Heart attack Mother     Dementia Mother     Heart failure Mother          2006    Heart disease Mother         heart attacks (2)    No Known Problems Father        Meds/Allergies   all current active meds have been reviewed    Allergies   Allergen Reactions    Lisinopril Swelling and Cough    Tetanus Antitoxin Anaphylaxis    Tetanus Toxoid Anaphylaxis and Swelling       Objective   Vitals: Blood pressure 104/68, pulse 98, temperature (!) 96.4 °F (35.8 °C), resp. rate 18, height 5' 8\" (1.727 m), weight 78 kg (171 lb 15.3 oz), SpO2 97%.,Body mass index is 26.15 kg/m².    Intake/Output Summary (Last 24 hours) at 2024 1621  Last data filed at 2024 1200  Gross per 24 hour   Intake 240 ml   Output 400 ml   Net -160 ml     Invasive Devices       Peripheral Intravenous Line  Duration             Peripheral IV 24 Left Antecubital <1 day                    Physical Exam  Body mass index is 26.15 kg/m².   Gen: not in acute distress,   Neck/Eyes: supple, PERRL  Ear: normal appearance,+ significant hearing impairment  Nose:  normal nasal mucosa, no drainage  Salivary glands: soft nontender  Chest: normal respiratory efforts, bilateral/mild coarse rhonchi  CV: Distant heart sounds, no murmurs appreciated, mild peripheral edema  Abdomen: soft, non tender  Neuro: AAO X3, no focal motor deficit       Lab Results: I have personally reviewed pertinent lab results.          None    Portions of the record may have been created with voice recognition software.  Occasional wrong word or \"sound a like\" substitutions may have occurred due to the inherent limitations of voice recognition software.  Read the chart carefully and recognize, using context, where substitutions have occurred.    "

## 2024-01-14 NOTE — ASSESSMENT & PLAN NOTE
Patient presented to ED very hypoxic was on BiPAP initially and transitioned down to CPAP secondary to pneumonia and COPD exacerbation  Respiratory protocol  Pulmonary consult  Patient wears 3LNC chronically

## 2024-01-14 NOTE — RESPIRATORY THERAPY NOTE
RT Protocol Note  Seun Alva 78 y.o. male MRN: [id number]  Unit/Bed#: -01 Encounter: [id number]    Assessment    Principal Problem:    Acute and chronic respiratory failure with hypoxia (HCC)  Active Problems:    Coronary artery disease involving native coronary artery of native heart without angina pectoris    LAMBERTO on CPAP    COPD, severe (HCC)    Right lower lobe pneumonia    Elevated troponin    Dysphagia    Hypertensive heart and chronic kidney disease with heart failure and stage 1 through stage 4 chronic kidney disease, or unspecified chronic kidney disease (HCC)    Chronic systolic congestive heart failure (HCC)      Home Pulmonary Medications: pulmicort neb BID, performist neb BID, douneb tid  Home Devices/Therapy: Home O2, BiPAP/CPAP, Other (Comment) (nebs)    Past Medical History:   Diagnosis Date    Abnormal ECG 2021 OR 2022    Alcoholism (HCC) 1984    sober 38 years    Arthritis 2008    beginning    Bilateral carotid artery stenosis     Callus     Cancer (HCC)     skin    Chronic diastolic heart failure (HCC)     Chronic ischemic heart disease     Chronic kidney disease     stage 3    Colon polyp     COPD (chronic obstructive pulmonary disease) (HCC)     Coronary artery disease     hx stents, MI, PCI    COVID 11/2021    COVID 12/01/2023    CPAP (continuous positive airway pressure) dependence     Disease of thyroid gland 2017    nodules    Emphysema of lung (HCC) 1995    started    Hearing loss     History of transfusion 1995    Hyperlipidemia     Hypertension     Intracranial aneurysm 04/25/2023    Lung nodule 2023    x rays    MI (myocardial infarction) (HCC) 1995    Myocardial infarction (HCC) 1995    Pneumonia     Pneumothorax 02/20/2023    collapsed lung    Prostate cancer (HCC)     RSV (respiratory syncytial virus infection) 10/2022    S/P carotid endarterectomy     Shortness of breath     O2 2 l/nc PRN    Sleep apnea     Sleep apnea, obstructive     Stented coronary artery      Social  History     Socioeconomic History    Marital status: /Civil Union     Spouse name: None    Number of children: None    Years of education: None    Highest education level: None   Occupational History    None   Tobacco Use    Smoking status: Former     Current packs/day: 0.00     Average packs/day: 2.0 packs/day for 35.5 years (71.0 ttl pk-yrs)     Types: Cigarettes     Start date: 1960     Quit date: 1995     Years since quittin.5    Smokeless tobacco: Never    Tobacco comments:     stopped smoking the day i had a heart attack   Vaping Use    Vaping status: Never Used   Substance and Sexual Activity    Alcohol use: Not Currently    Drug use: Never    Sexual activity: Not Currently     Partners: Female     Birth control/protection: None   Other Topics Concern    None   Social History Narrative    None     Social Determinants of Health     Financial Resource Strain: Patient Declined (2023)    Overall Financial Resource Strain (CARDIA)     Difficulty of Paying Living Expenses: Patient declined   Food Insecurity: No Food Insecurity (10/19/2023)    Hunger Vital Sign     Worried About Running Out of Food in the Last Year: Never true     Ran Out of Food in the Last Year: Never true   Transportation Needs: No Transportation Needs (10/19/2023)    PRAPARE - Transportation     Lack of Transportation (Medical): No     Lack of Transportation (Non-Medical): No   Physical Activity: Not on file   Stress: Not on file   Social Connections: Not on file   Intimate Partner Violence: Not At Risk (2021)    Received from [facility]    Interpersonal Safety     Social Determinants: Intimate Partner Violence Past Fear: Not on file     Social Determinants: Intimate Partner Violence Current Fear: Not on file   Housing Stability: Low Risk  (10/19/2023)    Housing Stability Vital Sign     Unable to Pay for Housing in the Last Year: No     Number of Places Lived in the Last Year: 1     Unstable Housing in  the Last Year: No       Subjective         Objective    Physical Exam:   Assessment Type: Assess only  General Appearance: Alert, Awake  Respiratory Pattern: Normal  Chest Assessment: Chest expansion symmetrical  Bilateral Breath Sounds: Diminished  O2 Device: cpap    Vitals:  Blood pressure 104/68, pulse 102, temperature 98.1 °F (36.7 °C), resp. rate 20, SpO2 99%.          Imaging and other studies: I have personally reviewed pertinent reports.     01/14/24 0430   Respiratory Protocol   Protocol Initiated? Yes   Protocol Selection Respiratory   Language Barrier? No   Medical & Social History Reviewed? Yes   Diagnostic Studies Reviewed? Yes   Physical Assessment Performed? Yes   Home Devices/Therapy Home O2;BiPAP/CPAP;Other (Comment)  (nebs)   Respiratory Plan Home Bronchodilator Patient pathway   Respiratory Assessment   Assessment Type Assess only   General Appearance Alert;Awake   Respiratory Pattern Normal   Chest Assessment Chest expansion symmetrical   Bilateral Breath Sounds Diminished   Resp Comments pt transported to [facility] without incident on cpap. pt assessed as per protocol. BBS diminshed. Pt has hx of COPD, LAMBERTO.  pt wears home oxygen at 3-4 L. pt wears cpap HS. pt to be ordered xopenex tid   O2 Device cpap   Additional Assessments   SpO2 99 %   Transport   Patient Status Inpatient   Tolerated Well Yes         O2 Device: cpap     Plan    Respiratory Plan: Home Bronchodilator Patient pathway        Resp Comments: pt transported to [facility] without incident on cpap. pt assessed as per protocol. BBS diminshed. Pt has hx of COPD, LAMBERTO.  pt wears home oxygen at 3-4 L. pt wears cpap HS. pt to be ordered xopenex tid

## 2024-01-14 NOTE — ASSESSMENT & PLAN NOTE
Wt Readings from Last 3 Encounters:   01/08/24 78 kg (172 lb)   01/04/24 77.6 kg (171 lb)   12/29/23 79.9 kg (176 lb 1.6 oz)     BP stable continue home medications  W/ CHEYANNE; creat baseline 1.2 up to 1.65 in 2 days  Had 500ml IVF in ED  Monitor

## 2024-01-14 NOTE — H&P
Critical access hospital  H&P  Name: Seun Alva 78 y.o. male I MRN: [id number]  Unit/Bed#: ED 20 I Date of Admission: 1/14/2024   Date of Service: 1/14/2024 I Hospital Day: 0      Assessment/Plan   * Acute and chronic respiratory failure with hypoxia (HCC)  Assessment & Plan  Patient presented to ED very hypoxic was on BiPAP initially and transitioned down to CPAP secondary to pneumonia and COPD exacerbation  Respiratory protocol  Pulmonary consult  Patient wears 3LNC chronically    Right lower lobe pneumonia  Assessment & Plan  Pneumonia pathway  IV abx cefepime and azithro started in ED    COPD, severe (HCC)  Assessment & Plan  Patient with shortness of breath and wheezing was requiring BiPAP in the ED  COPD pathway  Pulmonary consult  Respiratory protocol  Continue PO steriods    Chronic systolic congestive heart failure (HCC)  Assessment & Plan  Wt Readings from Last 3 Encounters:   01/08/24 78 kg (172 lb)   01/04/24 77.6 kg (171 lb)   12/29/23 79.9 kg (176 lb 1.6 oz)     Continue home medications  Monitor weight  Had 500ml IVF in ED, hold on further fluids    Hypertensive heart and chronic kidney disease with heart failure and stage 1 through stage 4 chronic kidney disease, or unspecified chronic kidney disease (HCC)  Assessment & Plan  Wt Readings from Last 3 Encounters:   01/08/24 78 kg (172 lb)   01/04/24 77.6 kg (171 lb)   12/29/23 79.9 kg (176 lb 1.6 oz)     BP stable continue home medications  W/ CHEYANNE; creat baseline 1.2 up to 1.65 in 2 days  Had 500ml IVF in ED  Monitor    Dysphagia  Assessment & Plan  Mechanical soft diet with nectar thick liquids    Elevated troponin  Assessment & Plan  Suspect secondary to hypoxia  Continue to trend and monitor  Cardiology consult as indicated    LAMBERTO on CPAP  Assessment & Plan  CPAP At bedtime    Coronary artery disease involving native coronary artery of native heart without angina pectoris  Assessment & Plan  Continue home medications       VTE  "Pharmacologic Prophylaxis: VTE Score: 7 High Risk (Score >/= 5) - Pharmacological DVT Prophylaxis Ordered: enoxaparin (Lovenox). Sequential Compression Devices Ordered.  Code Status: DNR/DNI  Discussion with patient    Anticipated Length of Stay: Patient will be admitted on an inpatient basis with an anticipated length of stay of greater than 2 midnights secondary to respiratory monitoring, specialist input.    Total Time Spent on Date of Encounter in care of patient: 75 mins. This time was spent on one or more of the following: performing physical exam; counseling and coordination of care; obtaining or reviewing history; documenting in the medical record; reviewing/ordering tests, medications or procedures; communicating with other healthcare professionals and discussing with patient's family/caregivers.    Chief Complaint: shortness of breath    History of Present Illness:  Seun Alva is a 78 y.o. male with a PMH of COPD with 3 L nasal cannula obstructive sleep apnea on CPAP, chronic systolic CHF with EF 35%, chronic kidney disease stage IIIa, CAD, hypertension, hyperlipidemia who presents with shortness of breath. Patient reports was not feeling well for about a week, last night he noted his breathing was bad. He had some chest discomfort and took 2 nitro. Rates as a 3-4, left sided, hx of 10 stents. Notes nasal congestion. Had soft stool today. Had increased weakness and trouble breathing was using his walker at home. Has not gone out recently. Patient reports trouble swallowing \"flap doesn't work right on back of my throat\" Started on prednisone with Pulmonary during office visit.     Review of Systems:  Review of Systems   Constitutional:  Positive for chills and fatigue. Negative for fever.   HENT:  Positive for rhinorrhea and sore throat. Negative for trouble swallowing.    Respiratory:  Positive for cough, shortness of breath and wheezing.    Cardiovascular:  Positive for chest pain. Negative for " palpitations.   Gastrointestinal:  Positive for diarrhea. Negative for abdominal pain, nausea and vomiting.   Genitourinary:  Negative for dysuria and hematuria.   Musculoskeletal:  Positive for neck pain. Negative for back pain.   Skin:  Negative for rash and wound.   Neurological:  Positive for dizziness, weakness and light-headedness.   Psychiatric/Behavioral:  Negative for agitation and confusion.        Past Medical and Surgical History:   Past Medical History:   Diagnosis Date    Abnormal ECG 2021 OR 2022    Alcoholism ([facility]) 1984    sober 38 years    Arthritis 2008    beginning    Bilateral carotid artery stenosis     Callus     Cancer ([facility])     skin    Chronic diastolic heart failure ([facility])     Chronic ischemic heart disease     Chronic kidney disease     stage 3    Colon polyp     COPD (chronic obstructive pulmonary disease) ([facility])     Coronary artery disease     hx stents, MI, PCI    COVID 11/2021    COVID 12/01/2023    CPAP (continuous positive airway pressure) dependence     Disease of thyroid gland 2017    nodules    Emphysema of lung ([facility]) 1995    started    Hearing loss     History of transfusion 1995    Hyperlipidemia     Hypertension     Intracranial aneurysm 04/25/2023    Lung nodule 2023    x rays    MI (myocardial infarction) ([facility]) 1995    Myocardial infarction ([facility]) 1995    Pneumonia     Pneumothorax 02/20/2023    collapsed lung    Prostate cancer ([facility])     RSV (respiratory syncytial virus infection) 10/2022    S/P carotid endarterectomy     Shortness of breath     O2 2 l/nc PRN    Sleep apnea     Sleep apnea, obstructive     Stented coronary artery        Past Surgical History:   Procedure Laterality Date    APPENDECTOMY      CARDIAC CATHETERIZATION  03/18/2021    left main with no significant disease, proximal LAD with 10% stenosis at the site of prior stent, left circumflex artery with minimal luminal irregularities mid RCA with 50% stenosis at site of prior stent and PL segment with distal  disease supplied by collaterals from the distal circumflex with no significant CAD requiring revascularization at that time.    CATARACT EXTRACTION, BILATERAL Bilateral     COLONOSCOPY      CORONARY ANGIOPLASTY WITH STENT PLACEMENT  1999    RCA    CORONARY ANGIOPLASTY WITH STENT PLACEMENT  2001    RCA    CORONARY ANGIOPLASTY WITH STENT PLACEMENT  2003    LAD    EYE SURGERY      NH BX PROSTATE STRTCTC SATURATION SAMPLING IMG GID N/A 09/13/2022    Procedure: TRANSPERINEAL MRI FUSION  BIOPSY PROSTATE;  Surgeon: Mele Patel MD;  Location: [facility];  Service: Urology    NH NEUROPLASTY &/TRANSPOS MEDIAN NRV CARPAL TUNNE Left 07/22/2022    Procedure: RELEASE CARPAL TUNNEL;  Surgeon: Matty Mcgowan MD;  Location: [facility];  Service: Orthopedics    NH NEUROPLASTY &/TRANSPOSITION ULNAR NERVE ELBOW Left 07/22/2022    Procedure: RELEASE CUBITAL TUNNEL;  Surgeon: Matty Mcgowan MD;  Location: [facility];  Service: Orthopedics    NH NEUROPLASTY &/TRANSPOSITION ULNAR NERVE WRIST Left 07/22/2022    Procedure: GUYON'S CANAL RELEASE;  Surgeon: Matty Mcgowan MD;  Location: [facility];  Service: Orthopedics    SKIN CANCER EXCISION  2012    chin-per pt, basal cell  also right ankle    TONSILLECTOMY  no       Meds/Allergies:  Prior to Admission medications    Medication Sig Start Date End Date Taking? Authorizing Provider   ALPRAZolam (XANAX) 0.25 mg tablet Take 1 tablet (0.25 mg total) by mouth daily at bedtime as needed for anxiety 11/22/23   Sarahy Clifford, DO   aspirin 81 mg chewable tablet 81 mg daily at bedtime    Historical Provider, MD   budesonide (PULMICORT) 0.5 mg/2 mL nebulizer solution Take 2 mL (0.5 mg total) by nebulization 2 (two) times a day Rinse mouth after use. 12/22/23   Sarahy Clifford, DO   cholecalciferol (VITAMIN D3) 1,000 units tablet Take 1,000 Units by mouth daily    Historical Provider, MD   dextromethorphan-guaiFENesin (ROBITUSSIN DM)  mg/5 mL syrup Take 5 mL by mouth 3 (three)  times a day as needed for cough 2/13/23   Manny Steele PA-C   Empagliflozin (Jardiance) 10 MG TABS tablet Take 1 tablet (10 mg total) by mouth every morning 9/6/23 2/27/25  Bar Powell MD   famotidine (PEPCID) 20 mg tablet TAKE 1 TABLET BY MOUTH DAILY AT  BEDTIME 6/23/23   Sarahy Clifford DO   fluticasone (FLONASE) 50 mcg/act nasal spray 1 spray into each nostril 2 (two) times a day 12/8/23   INOCENTE Shoemaker   formoterol (PERFOROMIST) 20 MCG/2ML nebulizer solution Take 2 mL (20 mcg total) by nebulization 2 (two) times a day 11/28/23   Sarahy Clifford DO   furosemide (LASIX) 40 mg tablet Take 0.5 tablets (20 mg total) by mouth daily 9/6/23   Bar Powell MD   gabapentin (NEURONTIN) 300 mg capsule Take 1 capsule (300 mg total) by mouth daily at bedtime 11/28/23   Sarahy Clifford DO   Humidifier MISC Use continuous 11/28/23   INOCENTE Parisi   HYDROcodone-acetaminophen (Norco) 5-325 mg per tablet Take 1 tablet by mouth every 6 (six) hours as needed for pain Max Daily Amount: 4 tablets 6/30/23   Sarahy Clifford DO   ipratropium-albuterol (DUO-NEB) 0.5-2.5 mg/3 mL nebulizer solution  8/14/23   Yaz Chisholm MD   methocarbamol (ROBAXIN) 500 mg tablet Take 1 tablet (500 mg total) by mouth 3 (three) times a day as needed for muscle spasms 7/23/23   Sarahy Clifford DO   metoprolol succinate (TOPROL-XL) 25 mg 24 hr tablet Take 0.5 tablets (12.5 mg total) by mouth 2 (two) times a day 10/27/23   Hyacinth Espana MD   naloxone (NARCAN) 4 mg/0.1 mL nasal spray Administer 1 spray into a nostril. If no response after 2-3 minutes, give another dose in the other nostril using a new spray. 6/30/23 6/29/24  Sarahy Clifford DO   nystatin (MYCOSTATIN) 500,000 units/5 mL suspension 5 ml orally (swish and spit/swallow) every four hours while awake 1/12/24   Sarahy Clifford DO   omeprazole (PriLOSEC) 20 mg delayed release capsule take 1 capsule by mouth once daily 11/27/23   Shakira Curry PA-C    oxygen gas Inhale 2 L/min continuous 2LPM at rest and 3-4 LPM with activity per D Cedric FANG notes    Historical Provider, MD   potassium chloride 10% oral solution Take 30 mL (40 mEq total) by mouth daily 24   Sarahy Clifford DO   predniSONE 10 mg tablet Take 4 tablets (40 mg total) by mouth daily for 3 days, THEN 3 tablets (30 mg total) daily for 3 days, THEN 2 tablets (20 mg total) daily for 3 days, THEN 1 tablet (10 mg total) daily for 3 days, THEN 0.5 tablets (5 mg total) daily for 3 days. 24  Sarahy Clifford DO   rosuvastatin (CRESTOR) 40 MG tablet TAKE 1 TABLET DAILY  Patient taking differently: Take 40 mg by mouth daily 22   Sarahy Clifford DO   sodium chloride (OCEAN) 0.65 % nasal spray 1 spray into each nostril every hour as needed for congestion 23   INOCENTE Shoemaker   Ascorbic Acid (vitamin C) 1000 MG tablet Take 1,000 mg by mouth daily  Patient not taking: No sig reported  10/15/22  Historical Provider, MD     I have reviewed home medications using recent Epic encounter.    Allergies:   Allergies   Allergen Reactions    Lisinopril Swelling and Cough    Tetanus Antitoxin Anaphylaxis    Tetanus Toxoid Anaphylaxis and Swelling       Social History:  Marital Status: /Civil Union   Occupation: Retired  Patient Pre-hospital Living Situation: Home  Patient Pre-hospital Level of Mobility: walks with walker, wheelchair, cane  Patient Pre-hospital Diet Restrictions: None  Substance Use History:   Social History     Substance and Sexual Activity   Alcohol Use Not Currently     Social History     Tobacco Use   Smoking Status Former    Current packs/day: 0.00    Average packs/day: 2.0 packs/day for 35.5 years (71.0 ttl pk-yrs)    Types: Cigarettes    Start date: 1960    Quit date: 1995    Years since quittin.5   Smokeless Tobacco Never   Tobacco Comments    stopped smoking the day i had a heart attack     Social History     Substance and Sexual Activity    Drug Use Never       Family History:  Family History   Problem Relation Age of Onset    Heart attack Mother     Dementia Mother     Heart failure Mother          2006    Heart disease Mother         heart attacks (2)    No Known Problems Father        Physical Exam:     Vitals:   Blood Pressure: 128/76 (24 021)  Pulse: 104 (24 021)  Respirations: 22 (24)  SpO2: 99 % (24 0250)    Physical Exam  Vitals reviewed.   Constitutional:       Appearance: Normal appearance.      Comments: Elderly  male with face mask in place   HENT:      Head: Normocephalic and atraumatic.      Right Ear: External ear normal.      Left Ear: External ear normal.      Nose: Nose normal.   Eyes:      General:         Right eye: No discharge.         Left eye: No discharge.      Conjunctiva/sclera: Conjunctivae normal.   Cardiovascular:      Rate and Rhythm: Normal rate and regular rhythm.      Heart sounds: Normal heart sounds. No murmur heard.  Pulmonary:      Effort: Pulmonary effort is normal. No respiratory distress.      Breath sounds: Examination of the right-upper field reveals decreased breath sounds. Examination of the left-upper field reveals decreased breath sounds. Examination of the right-lower field reveals decreased breath sounds. Examination of the left-lower field reveals decreased breath sounds. Decreased breath sounds present. No wheezing or rales.   Abdominal:      General: Bowel sounds are normal. There is no distension.      Palpations: Abdomen is soft.      Tenderness: There is no abdominal tenderness. There is no guarding.   Musculoskeletal:         General: Normal range of motion.      Cervical back: Normal range of motion.      Right lower leg: Edema present.      Left lower leg: Edema present.   Skin:     General: Skin is warm and dry.   Neurological:      Mental Status: He is alert and oriented to person, place, and time. Mental status is at baseline.   Psychiatric:          Mood and Affect: Mood normal.         Behavior: Behavior normal.         Thought Content: Thought content normal.         Judgment: Judgment normal.            Additional Data:     Lab Results:  Results from last 7 days   Lab Units 01/14/24  0217   WBC Thousand/uL 11.44*   HEMOGLOBIN g/dL 13.7   HEMATOCRIT % 45.7   PLATELETS Thousands/uL 181   NEUTROS PCT % 79*   LYMPHS PCT % 9*   MONOS PCT % 11   EOS PCT % 1     Results from last 7 days   Lab Units 01/14/24  0217   SODIUM mmol/L 139   POTASSIUM mmol/L 4.4   CHLORIDE mmol/L 101   CO2 mmol/L 30   BUN mg/dL 18   CREATININE mg/dL 1.65*   ANION GAP mmol/L 8   CALCIUM mg/dL 9.0   ALBUMIN g/dL 3.7   TOTAL BILIRUBIN mg/dL 0.68   ALK PHOS U/L 71   ALT U/L 22   AST U/L 28   GLUCOSE RANDOM mg/dL 152*       Lines/Drains:  Invasive Devices       Peripheral Intravenous Line  Duration             Peripheral IV 01/14/24 Left Antecubital <1 day                Imaging: RLL pneumonia my read, formal read pending.  X-ray chest 1 view portable    (Results Pending)       EKG and Other Studies Reviewed on Admission:   EKG:  Sinus tach with PVCs rate 110, reviewed in MUSE personally.    ** Please Note: This note has been constructed using a voice recognition system. **

## 2024-01-14 NOTE — ASSESSMENT & PLAN NOTE
Wt Readings from Last 3 Encounters:   01/14/24 78 kg (171 lb 15.3 oz)   01/08/24 78 kg (172 lb)   01/04/24 77.6 kg (171 lb)     Mild decompensation, seems at baseline today, can transition back to oral diuretic regimen in AM  HF note reviewed, options moving forward appear limited, patient aware  BP prohibits further GDMT (ie Entresto/Aldactone) at this time

## 2024-01-14 NOTE — ASSESSMENT & PLAN NOTE
Patient with shortness of breath and wheezing was requiring BiPAP in the ED  COPD pathway  Pulmonary consult  Respiratory protocol  Continue PO steriods

## 2024-01-14 NOTE — ASSESSMENT & PLAN NOTE
Patient with shortness of breath and wheezing was requiring BiPAP in the ED  COPD pathway  Pulmonary consult  Respiratory protocol

## 2024-01-15 VITALS
TEMPERATURE: 97.1 F | DIASTOLIC BLOOD PRESSURE: 72 MMHG | SYSTOLIC BLOOD PRESSURE: 120 MMHG | OXYGEN SATURATION: 96 % | HEART RATE: 56 BPM | RESPIRATION RATE: 22 BRPM

## 2024-01-15 LAB
ALBUMIN SERPL BCP-MCNC: 3.7 G/DL (ref 3.5–5)
ALP SERPL-CCNC: 59 U/L (ref 34–104)
ALT SERPL W P-5'-P-CCNC: 30 U/L (ref 7–52)
ANION GAP SERPL CALCULATED.3IONS-SCNC: 8 MMOL/L
AST SERPL W P-5'-P-CCNC: 40 U/L (ref 13–39)
BASOPHILS # BLD AUTO: 0.01 THOUSANDS/ÂΜL (ref 0–0.1)
BASOPHILS NFR BLD AUTO: 0 % (ref 0–1)
BILIRUB SERPL-MCNC: 1.01 MG/DL (ref 0.2–1)
BUN SERPL-MCNC: 18 MG/DL (ref 5–25)
CALCIUM SERPL-MCNC: 9.3 MG/DL (ref 8.4–10.2)
CHLORIDE SERPL-SCNC: 102 MMOL/L (ref 96–108)
CO2 SERPL-SCNC: 32 MMOL/L (ref 21–32)
CREAT SERPL-MCNC: 1.55 MG/DL (ref 0.6–1.3)
EOSINOPHIL # BLD AUTO: 0 THOUSAND/ÂΜL (ref 0–0.61)
EOSINOPHIL NFR BLD AUTO: 0 % (ref 0–6)
ERYTHROCYTE [DISTWIDTH] IN BLOOD BY AUTOMATED COUNT: 20.2 % (ref 11.6–15.1)
GFR SERPL CREATININE-BSD FRML MDRD: 41 ML/MIN/1.73SQ M
GLUCOSE SERPL-MCNC: 185 MG/DL (ref 65–140)
HCT VFR BLD AUTO: 36.5 % (ref 36.5–49.3)
HGB BLD-MCNC: 11.5 G/DL (ref 12–17)
IMM GRANULOCYTES # BLD AUTO: 0.03 THOUSAND/UL (ref 0–0.2)
IMM GRANULOCYTES NFR BLD AUTO: 0 % (ref 0–2)
L PNEUMO1 AG UR QL IA.RAPID: NEGATIVE
LYMPHOCYTES # BLD AUTO: 0.29 THOUSANDS/ÂΜL (ref 0.6–4.47)
LYMPHOCYTES NFR BLD AUTO: 3 % (ref 14–44)
MCH RBC QN AUTO: 32.7 PG (ref 26.8–34.3)
MCHC RBC AUTO-ENTMCNC: 31.5 G/DL (ref 31.4–37.4)
MCV RBC AUTO: 104 FL (ref 82–98)
MONOCYTES # BLD AUTO: 0.73 THOUSAND/ÂΜL (ref 0.17–1.22)
MONOCYTES NFR BLD AUTO: 8 % (ref 4–12)
NEUTROPHILS # BLD AUTO: 8.18 THOUSANDS/ÂΜL (ref 1.85–7.62)
NEUTS SEG NFR BLD AUTO: 89 % (ref 43–75)
NRBC BLD AUTO-RTO: 0 /100 WBCS
PLATELET # BLD AUTO: 172 THOUSANDS/UL (ref 149–390)
PMV BLD AUTO: 12.2 FL (ref 8.9–12.7)
POTASSIUM SERPL-SCNC: 4.6 MMOL/L (ref 3.5–5.3)
PROCALCITONIN SERPL-MCNC: 0.13 NG/ML
PROT SERPL-MCNC: 5.6 G/DL (ref 6.4–8.4)
RBC # BLD AUTO: 3.52 MILLION/UL (ref 3.88–5.62)
S PNEUM AG UR QL: NEGATIVE
SODIUM SERPL-SCNC: 142 MMOL/L (ref 135–147)
WBC # BLD AUTO: 9.24 THOUSAND/UL (ref 4.31–10.16)

## 2024-01-15 PROCEDURE — 99232 SBSQ HOSP IP/OBS MODERATE 35: CPT | Performed by: INTERNAL MEDICINE

## 2024-01-15 PROCEDURE — 94760 N-INVAS EAR/PLS OXIMETRY 1: CPT

## 2024-01-15 PROCEDURE — 85025 COMPLETE CBC W/AUTO DIFF WBC: CPT | Performed by: HOSPITALIST

## 2024-01-15 PROCEDURE — 80053 COMPREHEN METABOLIC PANEL: CPT | Performed by: HOSPITALIST

## 2024-01-15 PROCEDURE — 84145 PROCALCITONIN (PCT): CPT | Performed by: PHYSICIAN ASSISTANT

## 2024-01-15 PROCEDURE — 94640 AIRWAY INHALATION TREATMENT: CPT

## 2024-01-15 PROCEDURE — 94664 DEMO&/EVAL PT USE INHALER: CPT

## 2024-01-15 RX ADMIN — Medication 1000 UNITS: at 09:19

## 2024-01-15 RX ADMIN — PREDNISONE 40 MG: 20 TABLET ORAL at 09:17

## 2024-01-15 RX ADMIN — CEFEPIME HYDROCHLORIDE 1000 MG: 1 INJECTION, SOLUTION INTRAVENOUS at 09:16

## 2024-01-15 RX ADMIN — POTASSIUM CHLORIDE 40 MEQ: 1.5 SOLUTION ORAL at 09:17

## 2024-01-15 RX ADMIN — LEVALBUTEROL HYDROCHLORIDE 1.25 MG: 1.25 SOLUTION RESPIRATORY (INHALATION) at 07:17

## 2024-01-15 RX ADMIN — BUDESONIDE INHALATION 0.5 MG: 0.5 SUSPENSION RESPIRATORY (INHALATION) at 07:17

## 2024-01-15 RX ADMIN — FLUTICASONE PROPIONATE 1 SPRAY: 50 SPRAY, METERED NASAL at 17:34

## 2024-01-15 RX ADMIN — ENOXAPARIN SODIUM 40 MG: 40 INJECTION SUBCUTANEOUS at 09:16

## 2024-01-15 RX ADMIN — LEVALBUTEROL HYDROCHLORIDE 1.25 MG: 1.25 SOLUTION RESPIRATORY (INHALATION) at 20:04

## 2024-01-15 RX ADMIN — EMPAGLIFLOZIN 10 MG: 10 TABLET, FILM COATED ORAL at 09:19

## 2024-01-15 RX ADMIN — FAMOTIDINE 20 MG: 20 TABLET ORAL at 21:21

## 2024-01-15 RX ADMIN — FLUTICASONE PROPIONATE 1 SPRAY: 50 SPRAY, METERED NASAL at 09:18

## 2024-01-15 RX ADMIN — FORMOTEROL FUMARATE DIHYDRATE 20 MCG: 20 SOLUTION RESPIRATORY (INHALATION) at 07:17

## 2024-01-15 RX ADMIN — ACETAMINOPHEN 650 MG: 325 TABLET ORAL at 21:21

## 2024-01-15 RX ADMIN — CEFEPIME HYDROCHLORIDE 1000 MG: 1 INJECTION, SOLUTION INTRAVENOUS at 17:46

## 2024-01-15 RX ADMIN — PANTOPRAZOLE SODIUM 20 MG: 20 TABLET, DELAYED RELEASE ORAL at 05:44

## 2024-01-15 RX ADMIN — AZITHROMYCIN MONOHYDRATE 500 MG: 500 INJECTION, POWDER, LYOPHILIZED, FOR SOLUTION INTRAVENOUS at 05:43

## 2024-01-15 RX ADMIN — LEVALBUTEROL HYDROCHLORIDE 1.25 MG: 1.25 SOLUTION RESPIRATORY (INHALATION) at 15:03

## 2024-01-15 RX ADMIN — SODIUM CHLORIDE SOLN NEBU 3% 4 ML: 3 NEBU SOLN at 07:17

## 2024-01-15 RX ADMIN — ALPRAZOLAM 0.25 MG: 0.25 TABLET ORAL at 23:22

## 2024-01-15 RX ADMIN — SODIUM CHLORIDE SOLN NEBU 3% 4 ML: 3 NEBU SOLN at 20:04

## 2024-01-15 RX ADMIN — FORMOTEROL FUMARATE DIHYDRATE 20 MCG: 20 SOLUTION RESPIRATORY (INHALATION) at 20:39

## 2024-01-15 RX ADMIN — BUDESONIDE INHALATION 0.5 MG: 0.5 SUSPENSION RESPIRATORY (INHALATION) at 20:06

## 2024-01-15 RX ADMIN — ASPIRIN 81 MG CHEWABLE TABLET 81 MG: 81 TABLET CHEWABLE at 21:20

## 2024-01-15 RX ADMIN — ALPRAZOLAM 0.25 MG: 0.25 TABLET ORAL at 00:39

## 2024-01-15 RX ADMIN — GABAPENTIN 300 MG: 300 CAPSULE ORAL at 21:20

## 2024-01-15 RX ADMIN — ATORVASTATIN CALCIUM 80 MG: 80 TABLET, FILM COATED ORAL at 15:49

## 2024-01-15 RX ADMIN — SODIUM CHLORIDE SOLN NEBU 3% 4 ML: 3 NEBU SOLN at 15:03

## 2024-01-15 RX ADMIN — METOPROLOL SUCCINATE 12.5 MG: 25 TABLET, EXTENDED RELEASE ORAL at 21:19

## 2024-01-15 NOTE — ASSESSMENT & PLAN NOTE
Wt Readings from Last 3 Encounters:   01/15/24 76.8 kg (169 lb 3.3 oz)   01/08/24 78 kg (172 lb)   01/04/24 77.6 kg (171 lb)     BP stable continue home medications  With component of acute kidney injury.  Diuretics held for now  Monitor urine output  Repeat BMP in the morning

## 2024-01-15 NOTE — ASSESSMENT & PLAN NOTE
Patient presented to ED very hypoxic was on BiPAP initially and transitioned down to CPAP secondary to pneumonia and COPD exacerbation  Patient is chronically on 3 L nasal cannula at home  Patient has improved with steroids, diuresis, antibiotics  Pulmonology and cardiology input appreciated  Titrated back down to home O2 requirement  Continue prednisone taper  Diuretics held due to bump in creatinine

## 2024-01-15 NOTE — ASSESSMENT & PLAN NOTE
Chest x-ray with right basilar opacity, possible aspiration  Continue IV antibiotics with cefepime/Zithromax  Blood cultures negative to date  COVID/flu/RSV negative  Strep pneumo/urine Legionella negative  Check swallow eval  Continue oxygen as needed  Pulmonology input appreciated  Trend procalcitonin

## 2024-01-15 NOTE — ASSESSMENT & PLAN NOTE
Patient with shortness of breath and wheezing was requiring BiPAP in the ED  Pulmonology input appreciated  Continue nebulizers, Pulmicort  Continue steroid taper  Currently back to home O2 requirement of 3 L nasal cannula

## 2024-01-15 NOTE — RESPIRATORY THERAPY NOTE
01/14/24 9426   Respiratory Assessment   Resp Comments pt palced on own preset cpap machine with 3.5 l   Non-Invasive Information   O2 Interface Device Full face mask   Non-Invasive Ventilation Mode CPAP   Waiver signed Yes   SpO2 95 %   $ Pulse Oximetry Spot Check Charge Completed   Non-Invasive Settings   EPAP (cm)   (preset)   FiO2 (%)   (3.5L)

## 2024-01-15 NOTE — UTILIZATION REVIEW
"Initial Clinical Review    Admission: Date/Time/Statement:   Admission Orders (From admission, onward)       Ordered        01/14/24 0323  INPATIENT ADMISSION  Once                          Orders Placed This Encounter   Procedures    INPATIENT ADMISSION     Standing Status:   Standing     Number of Occurrences:   1     Order Specific Question:   Level of Care     Answer:   Med Surg [16]     Order Specific Question:   Estimated length of stay     Answer:   More than 2 Midnights     Order Specific Question:   Certification     Answer:   I certify that inpatient services are medically necessary for this patient for a duration of greater than two midnights. See H&P and MD Progress Notes for additional information about the patient's course of treatment.     ED Arrival Information       Expected   -    Arrival   1/14/2024 01:59    Acuity   Emergent              Means of arrival   Wheelchair    Escorted by   Spouse    Service   Hospitalist    Admission type   Emergency              Arrival complaint   chest pain             Chief Complaint   Patient presents with    Chest Pain     Pt reports taking 2 nitro prior to arrival and reports relief    Shortness of Breath       Initial Presentation: 78 y.o. male with a PMH of COPD with 3 L nasal cannula obstructive sleep apnea on CPAP, chronic systolic CHF with EF 35%, chronic kidney disease stage IIIa, CAD, hypertension, hyperlipidemia who presents with shortness of breath. Patient reports was not feeling well for about a week, last night he noted his breathing was bad. He had some chest discomfort and took 2 nitro. Rates as a 3-4, left sided, hx of 10 stents. Notes nasal congestion. Had soft stool today. Had increased weakness and trouble breathing was using his walker at home. Has not gone out recently. Patient reports trouble swallowing \"flap doesn't work right on back of my throat\" Started on prednisone with Pulmonary during office visit.  Plan: Inpatient admission for " evaluation and treatment of acute on chronic resp failure with hypoxia, RLL pneumonia, CHF, COPD, CKD, dysphagia, elevated troponin, CAD: Pulmonology consult, IV cefepime and azithromycin, continue po steroid, continue home antihypertensives.    Cardiology consult: Elevated troponin not consistent with ACS, likely demand spill secondary to exac COPD/Emphysema/severe hypoxia. Continue IV antibiotics and lasix.     Pulmonology consult: Continue azithromycin/cefepime, complete 5 to 7 days of antibiotics. Follow sputum cultures. Continue prednisone 40 mg for 2 more days/agree with a taper after that. Consider gentle diuresis continue nebulized only treatment. Wean FiO2 for SpO2 above 88%/now at baseline oxygen.     Date: 1/16    Day 3: Has surpassed a 2nd midnight with active treatments and services, which include IV antibiotics.      ED Triage Vitals   Temperature Pulse Respirations Blood Pressure SpO2   01/14/24 0458 01/14/24 0219 01/14/24 0219 01/14/24 0219 01/14/24 0219   98.1 °F (36.7 °C) 104 22 128/76 (!) 68 %      Temp Source Heart Rate Source Patient Position - Orthostatic VS BP Location FiO2 (%)   01/14/24 1127 -- 01/14/24 0219 01/14/24 0219 --   Temporal  Lying Left arm       Pain Score       01/14/24 0500       3          Wt Readings from Last 1 Encounters:   01/15/24 76.8 kg (169 lb 3.3 oz)     Additional Vital Signs:     Date/Time Temp Pulse Resp BP MAP (mmHg) SpO2 Calculated FIO2 (%) - Nasal Cannula Nasal Cannula O2 Flow Rate (L/min) O2 Device   01/15/24 0748 -- -- -- -- -- 99 % 32 3 L/min Nasal cannula   01/15/24 0738 -- -- -- -- -- 100 % 32 3 L/min Nasal cannula   01/15/24 0718 -- -- -- -- -- 99 % 32 3 L/min Nasal cannula   01/15/24 0717 -- -- -- -- -- 99 % -- -- --   01/15/24 07:12:50 97.6 °F (36.4 °C) 99 -- 104/71 82 100 % -- -- --   01/15/24 07:11:34 -- 96 -- 99/65 76 99 % -- -- --   01/14/24 2246 -- -- -- -- -- 95 % -- -- --   01/14/24 22:18:25 97.3 °F (36.3 °C) Abnormal  99 20 97/66 76 96 % -- -- --    01/14/24 1942 -- -- -- -- -- 98 % -- -- --   01/14/24 1455 96.4 °F (35.8 °C) Abnormal  98 18 104/68 -- 97 % 32 3 L/min --   01/14/24 1307 -- -- -- -- -- 96 % 32 3 L/min Nasal cannula   01/14/24 11:27:51 96.7 °F (35.9 °C) Abnormal  96 16 83/54 Abnormal  64 Abnormal  96 % -- -- --   01/14/24 0708 -- -- -- -- -- 99 % -- -- --   01/14/24 0530 -- -- -- -- -- -- -- -- CPAP   01/14/24 04:58:09 98.1 °F (36.7 °C) 102 20 104/68 80 99 % -- -- --   01/14/24 0430 -- -- -- -- -- 99 % -- -- --   01/14/24 0250 -- -- -- -- -- 99 % -- -- --   01/14/24 0241 -- -- -- -- -- 99 % -- -- --   01/14/24 0233 -- -- -- -- -- 100 % -- -- --   01/14/24 0223 -- -- -- -- -- 99 % -- -- --   O2 Interface Device: medium at 01/14/24 0223   01/14/24 0220 -- -- -- -- -- 70 % Abnormal  44 6 L/min Nasal cannula     Pertinent Labs/Diagnostic Test Results:   X-ray chest 1 view portable    (Results Pending)     1/14 EKG:  Sinus tachycardia with occasional Premature ventricular complexes and Fusion complexes  Nonspecific ST-t wave changes  Abnormal ECG    Results from last 7 days   Lab Units 01/14/24 0317   SARS-COV-2  Negative     Results from last 7 days   Lab Units 01/15/24  0555 01/14/24  0217   WBC Thousand/uL 9.24 11.44*   HEMOGLOBIN g/dL 11.5* 13.7   HEMATOCRIT % 36.5 45.7   PLATELETS Thousands/uL 172 181   NEUTROS ABS Thousands/µL 8.18* 8.95*         Results from last 7 days   Lab Units 01/15/24  0852 01/14/24  0217 01/12/24  1605   SODIUM mmol/L 142 139 141   POTASSIUM mmol/L 4.6 4.4 5.3   CHLORIDE mmol/L 102 101 102   CO2 mmol/L 32 30 32   ANION GAP mmol/L 8 8 7   BUN mg/dL 18 18 14   CREATININE mg/dL 1.55* 1.65* 1.22   EGFR ml/min/1.73sq m 41 38 56   CALCIUM mg/dL 9.3 9.0 9.1     Results from last 7 days   Lab Units 01/15/24  0852 01/14/24  0217   AST U/L 40* 28   ALT U/L 30 22   ALK PHOS U/L 59 71   TOTAL PROTEIN g/dL 5.6* 5.9*   ALBUMIN g/dL 3.7 3.7   TOTAL BILIRUBIN mg/dL 1.01* 0.68         Results from last 7 days   Lab Units 01/15/24  0852  01/14/24  0217 01/12/24  1605   GLUCOSE RANDOM mg/dL 185* 152* 98         Results from last 7 days   Lab Units 01/14/24  0421 01/14/24  0217   HS TNI 0HR ng/L  --  50*   HS TNI 2HR ng/L 37  --    HSTNI D2 ng/L -13  --    HS TNI 4HR ng/L 38  --    HSTNI D4 ng/L -12  --          Results from last 7 days   Lab Units 01/14/24  0421   PROTIME seconds 15.1*   INR  1.18   PTT seconds 30     Results from last 7 days   Lab Units 01/14/24  0217   TSH 3RD GENERATON uIU/mL 1.391     Results from last 7 days   Lab Units 01/15/24  0852 01/14/24  0421   PROCALCITONIN ng/ml 0.13 0.09     Results from last 7 days   Lab Units 01/14/24  0421   LACTIC ACID mmol/L 0.7             Results from last 7 days   Lab Units 01/14/24  0217   BNP pg/mL 3,360*           Results from last 7 days   Lab Units 01/14/24  1821 01/14/24  0317   STREP PNEUMONIAE ANTIGEN, URINE  Negative  --    LEGIONELLA URINARY ANTIGEN  Negative  --    INFLUENZA A PCR   --  Negative   INFLUENZA B PCR   --  Negative   RSV PCR   --  Negative         Results from last 7 days   Lab Units 01/14/24  0421 01/14/24  0318   BLOOD CULTURE  Received in Microbiology Lab. Culture in Progress.  Received in Microbiology Lab. Culture in Progress.  --    GRAM STAIN RESULT   --  2+ Epithelial cells per low power field*  No polys seen*  2+ Gram positive cocci in pairs and chains*  2+ Yeast*         ED Treatment:   Medication Administration from 01/14/2024 0159 to 01/14/2024 0449         Date/Time Order Dose Route Action     01/14/2024 0233 EST albuterol inhalation solution 10 mg 10 mg Nebulization Given     01/14/2024 0233 EST ipratropium (ATROVENT) 0.02 % inhalation solution 1 mg 1 mg Nebulization Given     01/14/2024 0233 EST sodium chloride 0.9 % inhalation solution 12 mL 12 mL Nebulization Given     01/14/2024 0251 EST sodium chloride 0.9 % bolus 250 mL 250 mL Intravenous New Bag     01/14/2024 0418 EST cefepime (MAXIPIME) IVPB (premix in dextrose) 2,000 mg 50 mL 2,000 mg  Intravenous New Bag          Past Medical History:   Diagnosis Date    Abnormal ECG 2021 OR 2022    Alcoholism (HCC) 1984    sober 38 years    Arthritis 2008    beginning    Bilateral carotid artery stenosis     Callus     Cancer (HCC)     skin    Chronic diastolic heart failure (HCC)     Chronic ischemic heart disease     Chronic kidney disease     stage 3    Colon polyp     COPD (chronic obstructive pulmonary disease) (HCC)     Coronary artery disease     hx stents, MI, PCI    COVID 11/2021    COVID 12/01/2023    CPAP (continuous positive airway pressure) dependence     Disease of thyroid gland 2017    nodules    Emphysema of lung (HCC) 1995    started    Hearing loss     History of transfusion 1995    Hyperlipidemia     Hypertension     Intracranial aneurysm 04/25/2023    Lung nodule 2023    x rays    MI (myocardial infarction) (HCC) 1995    Myocardial infarction (HCC) 1995    Pneumonia     Pneumothorax 02/20/2023    collapsed lung    Prostate cancer ([facility])     RSV (respiratory syncytial virus infection) 10/2022    S/P carotid endarterectomy     Shortness of breath     O2 2 l/nc PRN    Sleep apnea     Sleep apnea, obstructive     Stented coronary artery      Present on Admission:   Acute and chronic respiratory failure with hypoxia ([facility])   LAMBERTO on CPAP   Hypertensive heart and chronic kidney disease with heart failure and stage 1 through stage 4 chronic kidney disease, or unspecified chronic kidney disease (HCC)   Chronic systolic congestive heart failure (HCC)   Elevated troponin   Coronary artery disease involving native coronary artery of native heart without angina pectoris   Right lower lobe pneumonia   Dysphagia   COPD, severe ([facility])      Admitting Diagnosis: Shortness of breath [R06.02]  Chest pain [R07.9]  COPD exacerbation ([facility]) [J44.1]  Right lower lobe pneumonia [J18.9]  Acute and chronic respiratory failure with hypoxia ([facility]) [J96.21]  Chest pain, unspecified type [R07.9]  Age/Sex: 78 y.o.  male  Admission Orders:  Scheduled Medications:  aspirin, 81 mg, Oral, HS  atorvastatin, 80 mg, Oral, Daily With Dinner  azithromycin, 500 mg, Intravenous, Q24H  budesonide, 0.5 mg, Nebulization, BID  cefepime, 1,000 mg, Intravenous, Q12H  cholecalciferol, 1,000 Units, Oral, Daily  Empagliflozin, 10 mg, Oral, Daily  enoxaparin, 40 mg, Subcutaneous, Daily  famotidine, 20 mg, Oral, HS  fluticasone, 1 spray, Nasal, BID  formoterol, 20 mcg, Nebulization, BID  gabapentin, 300 mg, Oral, HS  levalbuterol, 1.25 mg, Nebulization, TID  metoprolol succinate, 12.5 mg, Oral, BID  pantoprazole, 20 mg, Oral, Early Morning  potassium chloride, 40 mEq, Oral, Daily  [START ON 1/16/2024] predniSONE, 30 mg, Oral, Daily   Followed by  [START ON 1/19/2024] predniSONE, 20 mg, Oral, Daily   Followed by  [START ON 1/22/2024] predniSONE, 10 mg, Oral, Daily   Followed by  [START ON 1/25/2024] predniSONE, 5 mg, Oral, Daily  sodium chloride, 1 mL, Intravenous, Once  sodium chloride, 4 mL, Nebulization, TID      Continuous IV Infusions:     PRN Meds:  acetaminophen, 650 mg, Oral, Q6H PRN  ALPRAZolam, 0.25 mg, Oral, HS PRN  calcium carbonate, 1,000 mg, Oral, BID PRN  dextromethorphan-guaiFENesin, 5 mL, Oral, TID PRN  HYDROcodone-acetaminophen, 1 tablet, Oral, Q6H PRN  methocarbamol, 500 mg, Oral, TID PRN  ondansetron, 4 mg, Intravenous, Q6H PRN  sodium chloride, 1 spray, Each Nare, Q1H PRN  sodium chloride (PF), 3 mL, Intravenous, Q1H PRN        IP CONSULT TO PULMONOLOGY  IP CONSULT TO CARDIOLOGY    Network Utilization Review Department  ATTENTION: Please call with any questions or concerns to [phone] and carefully listen to the prompts so that you are directed to the right person. All voicemails are confidential.   For Discharge needs, contact Care Management DC Support Team at [phone] opt. [phone]  Send all requests for admission clinical reviews, approved or denied determinations and any other requests to dedicated fax number below  belonging to the campus where the patient is receiving treatment. List of dedicated fax numbers for the Facilities:  FACILITY NAME UR FAX NUMBER   ADMISSION DENIALS (Administrative/Medical Necessity) [phone]   DISCHARGE SUPPORT TEAM (NETWORK) [phone]   PARENT CHILD HEALTH (Maternity/NICU/Pediatrics) [phone]   [facility] [phone]   [facility] [phone]   [facility] [phone]   [facility] [phone]   [facility] [phone]   [facility] [phone]   [facility] [phone]   [facility] [phone]   [facility] [phone]   [facility] [phone]   [facility] [phone]

## 2024-01-15 NOTE — PLAN OF CARE
Problem: PAIN - ADULT  Goal: Verbalizes/displays adequate comfort level or baseline comfort level  Description: Interventions:  - Encourage patient to monitor pain and request assistance  - Assess pain using appropriate pain scale  - Administer analgesics based on type and severity of pain and evaluate response  - Implement non-pharmacological measures as appropriate and evaluate response  - Consider cultural and social influences on pain and pain management  - Notify physician/advanced practitioner if interventions unsuccessful or patient reports new pain  Outcome: Progressing     Problem: INFECTION - ADULT  Goal: Absence or prevention of progression during hospitalization  Description: INTERVENTIONS:  - Assess and monitor for signs and symptoms of infection  - Monitor lab/diagnostic results  - Monitor all insertion sites, i.e. indwelling lines, tubes, and drains  - Monitor endotracheal if appropriate and nasal secretions for changes in amount and color  - [place] appropriate cooling/warming therapies per order  - Administer medications as ordered  - Instruct and encourage patient and family to use good hand hygiene technique  - Identify and instruct in appropriate isolation precautions for identified infection/condition  Outcome: Progressing     Problem: INFECTION - ADULT  Goal: Absence of fever/infection during neutropenic period  Description: INTERVENTIONS:  - Monitor WBC    Outcome: Progressing

## 2024-01-15 NOTE — ASSESSMENT & PLAN NOTE
Wt Readings from Last 3 Encounters:   01/15/24 76.8 kg (169 lb 3.3 oz)   01/08/24 78 kg (172 lb)   01/04/24 77.6 kg (171 lb)     With component of fluid overload  Continue home medications  Was initially on IV Lasix, held for now due to bump in creatinine  Monitor PENG's and daily weight

## 2024-01-15 NOTE — CASE MANAGEMENT
Case Management Assessment & Discharge Planning Note    Patient name Seun Alva  Location /-01 MRN [id number]  : 1945 Date 1/15/2024       Current Admission Date: 2024  Current Admission Diagnosis:Acute and chronic respiratory failure with hypoxia (HCC)   Patient Active Problem List    Diagnosis Date Noted    Acute on chronic combined systolic (congestive) and diastolic (congestive) heart failure (HCC) 2024    Left eye pain 2023    Intermittent constipation 2023    Chronic systolic congestive heart failure (HCC) 10/22/2023    Erectile dysfunction 2023    Other disorders of refraction 2023    Occlusion and stenosis of unspecified carotid artery 2023    Dysphagia 2023    Congestive heart failure with left ventricular systolic dysfunction (LVSD) ([facility]) 2023    Intracranial aneurysm 2023    Allergic rhinitis, unspecified 2023    Chronic kidney disease, stage 3a (HCC) 2023    Generalized muscle weakness 2023    Hypertensive heart and chronic kidney disease with heart failure and stage 1 through stage 4 chronic kidney disease, or unspecified chronic kidney disease (HCC) 2023    Need for assistance with personal care 2023    Other abnormalities of gait and mobility 2023    Sudden idiopathic hearing loss, right ear 2023    Acute and chronic respiratory failure with hypoxia ([facility]) 2023    Elevated troponin 2023    Right lower lobe pneumonia 2023    Orthopnea 2023    Lower extremity edema 2023    Irregular heart rhythm 2023    Left leg pain 2023    Transient right leg weakness 2023    COPD, severe ([facility]) 2023    Sensorineural hearing loss, bilateral 10/19/2022    Chronic respiratory failure with hypoxia (HCC) 10/15/2022    Prostate cancer (HCC) 2022    Elevated MCV 2022    Cubital tunnel syndrome on left 2022    Carpal tunnel  syndrome on left 07/22/2022    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    Incomplete bladder emptying 04/27/2022    Chronic bilateral low back pain with right-sided sciatica 04/18/2022    Mid back pain 04/18/2022    Thoracic radiculopathy 04/18/2022    Chronic pain syndrome 04/18/2022    Hoarseness or changing voice 04/14/2022    Chronic right-sided thoracic back pain 03/05/2022    Primary insomnia 03/02/2022    Right hip pain 01/20/2022    Abnormal nuclear stress test 03/18/2021    Costochondral chest pain 01/15/2021    COPD with acute exacerbation (HCC) 01/11/2021    Oxygen dependent 01/11/2021    S/P carotid endarterectomy 01/11/2021    Stented coronary artery 01/11/2021    Anisometropia 01/11/2021    Osteoarthritis of spinal facet joint 01/11/2021    Chronic ischemic heart disease 01/11/2021    Diverticular disease of colon 01/11/2021    Dry eye syndrome 01/11/2021    GERD (gastroesophageal reflux disease) 01/11/2021    Acute hearing loss, right 01/11/2021    Elevated PSA 01/11/2021    Lens replaced by other means 01/11/2021    Lumbar radiculopathy 01/11/2021    Multinodular goiter 01/11/2021    Nuclear senile cataract 01/11/2021    Occlusion and stenosis of carotid artery 01/11/2021    Osteoarthritis of hip 01/11/2021    Prediabetes 01/11/2021    LAMBERTO on CPAP 01/11/2021    Solitary pulmonary nodule 01/11/2021    Thyroid nodule 01/11/2021    [place] syndrome, 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    Depression, recurrent (HCC)     Hyperlipidemia     Coronary artery disease involving native coronary artery of native heart without angina pectoris     Left carotid artery occlusion       LOS (days): 1  Geometric Mean LOS (GMLOS) (days):   Days to GMLOS:     OBJECTIVE:    Risk of Unplanned Readmission Score: 52.24         Current admission status: Inpatient       Preferred Pharmacy:   [id number] - [place] - [address]  [address]  [place] [address]  Phone: [phone] Fax: [phone]    [facility] - [place], [place] - [address]  [address]  [address]  [facility] [address]  Phone: [phone] Fax: [phone]    Primary Care Provider: Sarahy Clifford DO    Primary Insurance: [employer] REP  Secondary Insurance:     ASSESSMENT:  Active Health Care Proxies       Brittany Alva Health Care Representative - Spouse   Primary Phone: [phone] (Mobile)                      Advance Directives  Does patient have a Health Care POA?: Yes (family needs to bring in paperwork)  Does patient have Advance Directives?: Yes (family needs to bring in paperwork)        Readmission Root Cause  30 Day Readmission: No     Patient Information  Admitted from:: Home  Mental Status: Alert  During Assessment patient was accompanied by: Not accompanied during assessment  Assessment information provided by:: Spouse  Primary Caregiver: Family  Caregiver's Name:: spouse & daughter  Caregiver's Relationship to Patient:: Family Member  Caregiver's Telephone Number:: wife  [phone]  Support Systems: Spouse/significant other, Daughter  County of Residence: [facility] do you live in?: [place]  Home entry access options. Select all that apply.: Stairs  Number of steps to enter home.: 2  Do the steps have railings?: Yes  Type of Current Residence: [address] [place] [address] home  Upon entering residence, is there a bedroom on the main floor (no further steps)?: Yes  Upon entering residence, is there a bathroom on the main floor (no further steps)?: Yes  Living Arrangements: Lives w/ Spouse/significant other, Lives w/ Daughter  Is patient a ?: Yes ([employer]  - pt has some benefits)  Is patient active with [facility] ([employer])?: Yes (pt received some befits and [facility])  Is patient service connected?: No     Activities of Daily Living Prior to Admission  Functional Status: Assistance (driving, meals, shopping , laundry)  Completes ADLs independently?: Yes  Ambulates independently?: Yes  Does  patient use assisted devices?: Yes  Assisted Devices (DME) used: Walker, Quad Cane, Other (Comment), Home Oxygen concentrator, CPAP, Portable Oxygen concentrator, Nebulizer (scooter)  DME Company Name (respiratory supplies): [facility]  O2 Rate(s): 3 liters  Does patient currently own DME?: Yes  What DME does the patient currently own?: CPAP, Quad Cane, Walker, Home Oxygen concentrator, Portable Oxygen tanks, Portable Oxygen concentrator, Nebulizer, Bedside Commode, Shower Chair, Other (Comment) (scooter, pulse ox, bp cuff)  Does patient have a history of Outpatient Therapy (PT/OT)?: Yes ([address] [place] [address] Pulmonary)  Does the patient have a history of Short-Term Rehab?: Yes ([facility])  Does patient have a history of HHC?: Yes (family unsure of the agency)  Does patient currently have HHC?: No        Patient Information Continued  Income Source: Pension/residential  Does patient have prescription coverage?: Yes ([employer])  Does patient receive dialysis treatments?: No  Does patient have a history of Mental Health Diagnosis?: No        Means of Transportation  Means of Transport to Appts:: Family transport (pt does drive short distances)             Housing Stability: Low Risk  (10/19/2023)     Housing Stability Vital Sign      Unable to Pay for Housing in the Last Year: No      Number of Places Lived in the Last Year: 1      Unstable Housing in the Last Year: No   Food Insecurity: No Food Insecurity (10/19/2023)     Hunger Vital Sign      Worried About Running Out of Food in the Last Year: Never true      Ran Out of Food in the Last Year: Never true   Transportation Needs: No Transportation Needs (10/19/2023)     PRAPARE - Transportation      Lack of Transportation (Medical): No      Lack of Transportation (Non-Medical): No   Utilities: Not on file         DISCHARGE DETAILS:     Discharge planning discussed with:: patient was sleeping. Message left for family.   Freedom of Choice: Yes  Comments - Freedom of Choice: pt not  stable for d/c - cm will continue to follow  CM contacted family/caregiver?: Yes     Contacts  Patient Contacts: Brittany Alva  Relationship to Patient:: Family (wife)  Contact Method: Phone  Phone Number: [phone]  Reason/Outcome: Discharge Planning        DME Referral Provided  Referral made for DME?: No     Other Referral/Resources/Interventions Provided:  Referral Comments: d/c plan tbd        Treatment Team Recommendation:  (d/c plan tbd- family)

## 2024-01-15 NOTE — PROGRESS NOTES
Critical access hospital  Progress Note  Name: Seun Alva I  MRN: [id number]  Unit/Bed#: -01 I Date of Admission: 1/14/2024   Date of Service: 1/15/2024 I Hospital Day: 1    Assessment/Plan   Right lower lobe pneumonia  Assessment & Plan  Chest x-ray with right basilar opacity, possible aspiration  Continue IV antibiotics with cefepime/Zithromax  Blood cultures negative to date  COVID/flu/RSV negative  Strep pneumo/urine Legionella negative  Check swallow eval  Continue oxygen as needed  Pulmonology input appreciated  Trend procalcitonin    Chronic systolic congestive heart failure (HCC)  Assessment & Plan  Wt Readings from Last 3 Encounters:   01/15/24 76.8 kg (169 lb 3.3 oz)   01/08/24 78 kg (172 lb)   01/04/24 77.6 kg (171 lb)     With component of fluid overload  Continue home medications  Was initially on IV Lasix, held for now due to bump in creatinine  Monitor PENG's and daily weight    Hypertensive heart and chronic kidney disease with heart failure and stage 1 through stage 4 chronic kidney disease, or unspecified chronic kidney disease (HCC)  Assessment & Plan  Wt Readings from Last 3 Encounters:   01/15/24 76.8 kg (169 lb 3.3 oz)   01/08/24 78 kg (172 lb)   01/04/24 77.6 kg (171 lb)     BP stable continue home medications  With component of acute kidney injury.  Diuretics held for now  Monitor urine output  Repeat BMP in the morning    Dysphagia  Assessment & Plan  Mechanical soft diet with nectar thick liquids.  Check swallow evaluation    Elevated troponin  Assessment & Plan  Suspect secondary to hypoxia  Continue to trend and monitor  Cardiology input appreciated    COPD, severe (HCC)  Assessment & Plan  Patient with shortness of breath and wheezing was requiring BiPAP in the ED  Pulmonology input appreciated  Continue nebulizers, Pulmicort  Continue steroid taper  Currently back to home O2 requirement of 3 L nasal cannula    LAMBERTO on CPAP  Assessment & Plan  CPAP At  bedtime    Coronary artery disease involving native coronary artery of native heart without angina pectoris  Assessment & Plan  Continue home medications    * Acute and chronic respiratory failure with hypoxia (HCC)  Assessment & Plan  Patient presented to ED very hypoxic was on BiPAP initially and transitioned down to CPAP secondary to pneumonia and COPD exacerbation  Patient is chronically on 3 L nasal cannula at home  Patient has improved with steroids, diuresis, antibiotics  Pulmonology and cardiology input appreciated  Titrated back down to home O2 requirement  Continue prednisone taper  Diuretics held due to bump in creatinine             VTE Pharmacologic Prophylaxis: VTE Score: 7 Moderate Risk (Score 3-4) - Pharmacological DVT Prophylaxis Ordered: enoxaparin (Lovenox).    Mobility:   Basic Mobility Inpatient Raw Score: 14  JH-HLM Goal: 4: Move to chair/commode  JH-HLM Achieved: 1: Laying in bed  HLM Goal achieved. Continue to encourage appropriate mobility.    Patient Centered Rounds: I performed bedside rounds with nursing staff today.   Discussions with Specialists or Other Care Team Provider: yes    Education and Discussions with Family / Patient: Attempted to update  (daughter Nedra) via phone. Unable to contact.    Current Length of Stay: 1 day(s)  Current Patient Status: Inpatient   Certification Statement: The patient will continue to require additional inpatient hospital stay due to PNA  Discharge Plan: Anticipate discharge in 24-48 hrs to discharge location to be determined pending rehab evaluations.    Code Status: Level 3 - DNAR and DNI    Subjective:   No overnight events noted.  Patient down to 3 L nasal cannula    Objective:     Vitals:   Temp (24hrs), Av.5 °F (36.4 °C), Min:97.3 °F (36.3 °C), Max:97.7 °F (36.5 °C)    Temp:  [97.3 °F (36.3 °C)-97.7 °F (36.5 °C)] 97.7 °F (36.5 °C)  HR:  [] 110  Resp:  [20] 20  BP: ()/(65-71) 98/67  SpO2:  [95 %-100 %] 98 %  Body  mass index is 25.73 kg/m².     Input and Output Summary (last 24 hours):     Intake/Output Summary (Last 24 hours) at 1/15/2024 1658  Last data filed at 1/15/2024 1100  Gross per 24 hour   Intake 360 ml   Output 1475 ml   Net -1115 ml       Physical Exam:   Physical Exam  Constitutional:       General: He is not in acute distress.  HENT:      Head: Normocephalic and atraumatic.      Nose: Nose normal.      Mouth/Throat:      Mouth: Mucous membranes are moist.   Eyes:      Extraocular Movements: Extraocular movements intact.      Conjunctiva/sclera: Conjunctivae normal.   Cardiovascular:      Rate and Rhythm: Normal rate and regular rhythm.   Pulmonary:      Effort: Pulmonary effort is normal. No respiratory distress.   Abdominal:      Palpations: Abdomen is soft.      Tenderness: There is no abdominal tenderness.   Musculoskeletal:         General: Normal range of motion.      Cervical back: Normal range of motion and neck supple.      Comments: Generalized weakness   Skin:     General: Skin is warm and dry.   Neurological:      General: No focal deficit present.      Mental Status: He is alert. Mental status is at baseline.      Cranial Nerves: No cranial nerve deficit.   Psychiatric:         Mood and Affect: Mood normal.         Behavior: Behavior normal.          Additional Data:     Labs:  Results from last 7 days   Lab Units 01/15/24  0555   WBC Thousand/uL 9.24   HEMOGLOBIN g/dL 11.5*   HEMATOCRIT % 36.5   PLATELETS Thousands/uL 172   NEUTROS PCT % 89*   LYMPHS PCT % 3*   MONOS PCT % 8   EOS PCT % 0     Results from last 7 days   Lab Units 01/15/24  0852   SODIUM mmol/L 142   POTASSIUM mmol/L 4.6   CHLORIDE mmol/L 102   CO2 mmol/L 32   BUN mg/dL 18   CREATININE mg/dL 1.55*   ANION GAP mmol/L 8   CALCIUM mg/dL 9.3   ALBUMIN g/dL 3.7   TOTAL BILIRUBIN mg/dL 1.01*   ALK PHOS U/L 59   ALT U/L 30   AST U/L 40*   GLUCOSE RANDOM mg/dL 185*     Results from last 7 days   Lab Units 01/14/24  0421   INR  1.18              Results from last 7 days   Lab Units 01/15/24  0852 01/14/24  0421   LACTIC ACID mmol/L  --  0.7   PROCALCITONIN ng/ml 0.13 0.09       Lines/Drains:  Invasive Devices       Peripheral Intravenous Line  Duration             Peripheral IV 01/14/24 Left Antecubital 1 day                          Imaging: No pertinent imaging reviewed.    Recent Cultures (last 7 days):   Results from last 7 days   Lab Units 01/14/24  1821 01/14/24  0421 01/14/24  0318   BLOOD CULTURE   --  No Growth at 24 hrs.  No Growth at 24 hrs.  --    SPUTUM CULTURE   --   --  Culture too young- will reincubate   GRAM STAIN RESULT   --   --  2+ Epithelial cells per low power field*  No polys seen*  2+ Gram positive cocci in pairs and chains*  2+ Yeast*   LEGIONELLA URINARY ANTIGEN  Negative  --   --        Last 24 Hours Medication List:   Current Facility-Administered Medications   Medication Dose Route Frequency Provider Last Rate    acetaminophen  650 mg Oral Q6H PRN Christen Larry PA-C      ALPRAZolam  0.25 mg Oral HS PRN Christen Larry PA-C      aspirin  81 mg Oral HS Christen Larry PA-C      atorvastatin  80 mg Oral Daily With Dinner Christen Larry PA-C      azithromycin  500 mg Intravenous Q24H Christen Larry PA-C 500 mg (01/15/24 0543)    budesonide  0.5 mg Nebulization BID Tesha Tojanele, DO      calcium carbonate  1,000 mg Oral BID PRN Christen Larry PA-C      cefepime  1,000 mg Intravenous Q12H Tesha Tojanele, DO 1,000 mg (01/15/24 0916)    cholecalciferol  1,000 Units Oral Daily Christen Larry PA-C      dextromethorphan-guaiFENesin  5 mL Oral TID PRN Christen Larry PA-C      Empagliflozin  10 mg Oral Daily Christen Larry PA-C      enoxaparin  40 mg Subcutaneous Daily Christen Larry PA-C      famotidine  20 mg Oral HS Christen Larry PA-C      fluticasone  1 spray Nasal BID Christen Larry PA-C      formoterol  20 mcg  Nebulization BID Tesha Tojanele, DO      gabapentin  300 mg Oral HS Christen Larry PA-C      HYDROcodone-acetaminophen  1 tablet Oral Q6H PRN Christen Larry PA-C      levalbuterol  1.25 mg Nebulization TID Tesha Tojanele, DO      methocarbamol  500 mg Oral TID PRN Christen Larry PA-C      metoprolol succinate  12.5 mg Oral BID Christen Larry PA-C      ondansetron  4 mg Intravenous Q6H PRN Christen Larry PA-C      pantoprazole  20 mg Oral Early Morning Christen Larry PA-C      potassium chloride  40 mEq Oral Daily Christen Larry PA-C      [START ON 1/16/2024] predniSONE  30 mg Oral Daily Christen Larry PA-C      Followed by    [START ON 1/19/2024] predniSONE  20 mg Oral Daily Christen Larry PA-C      Followed by    [START ON 1/22/2024] predniSONE  10 mg Oral Daily Christen Larry PA-C      Followed by    [START ON 1/25/2024] predniSONE  5 mg Oral Daily Christen Larry PA-C      sodium chloride  1 spray Each Nare Q1H PRN Christen Larry PA-C      sodium chloride (PF)  3 mL Intravenous Q1H PRN Bryce Gong MD      sodium chloride  1 mL Intravenous Once Bryce Gong MD      sodium chloride  4 mL Nebulization TID Tesha Avila DO          Today, Patient Was Seen By: Hyacinth Espana MD    **Please Note: This note may have been constructed using a voice recognition system.**

## 2024-01-15 NOTE — PLAN OF CARE
Problem: PAIN - ADULT  Goal: Verbalizes/displays adequate comfort level or baseline comfort level  Description: Interventions:  - Encourage patient to monitor pain and request assistance  - Assess pain using appropriate pain scale  - Administer analgesics based on type and severity of pain and evaluate response  - Implement non-pharmacological measures as appropriate and evaluate response  - Consider cultural and social influences on pain and pain management  - Notify physician/advanced practitioner if interventions unsuccessful or patient reports new pain  Outcome: Progressing     Problem: INFECTION - ADULT  Goal: Absence or prevention of progression during hospitalization  Description: INTERVENTIONS:  - Assess and monitor for signs and symptoms of infection  - Monitor lab/diagnostic results  - Monitor all insertion sites, i.e. indwelling lines, tubes, and drains  - Monitor endotracheal if appropriate and nasal secretions for changes in amount and color  - [place] appropriate cooling/warming therapies per order  - Administer medications as ordered  - Instruct and encourage patient and family to use good hand hygiene technique  - Identify and instruct in appropriate isolation precautions for identified infection/condition  Outcome: Progressing  Goal: Absence of fever/infection during neutropenic period  Description: INTERVENTIONS:  - Monitor WBC    Outcome: Progressing     Problem: SAFETY ADULT  Goal: Patient will remain free of falls  Description: INTERVENTIONS:  - Educate patient/family on patient safety including physical limitations  - Instruct patient to call for assistance with activity   - Consult OT/PT to assist with strengthening/mobility   - Keep Call bell within reach  - Keep bed low and locked with side rails adjusted as appropriate  - Keep care items and personal belongings within reach  - Initiate and maintain comfort rounds  - Make Fall Risk Sign visible to staff  - Offer Toileting every 4 Hours,  in advance of need  - Initiate/Maintain bed alarm  - Obtain necessary fall risk management equipment: .  - Apply yellow socks and bracelet for high fall risk patients  - Consider moving patient to room near nurses station  Outcome: Progressing  Goal: Maintain or return to baseline ADL function  Description: INTERVENTIONS:  -  Assess patient's ability to carry out ADLs; assess patient's baseline for ADL function and identify physical deficits which impact ability to perform ADLs (bathing, care of mouth/teeth, toileting, grooming, dressing, etc.)  - Assess/evaluate cause of self-care deficits   - Assess range of motion  - Assess patient's mobility; develop plan if impaired  - Assess patient's need for assistive devices and provide as appropriate  - Encourage maximum independence but intervene and supervise when necessary  - Involve family in performance of ADLs  - Assess for home care needs following discharge   - Consider OT consult to assist with ADL evaluation and planning for discharge  - Provide patient education as appropriate  Outcome: Progressing  Goal: Maintains/Returns to pre admission functional level  Description: INTERVENTIONS:  - Perform AM-PAC 6 Click Basic Mobility/ Daily Activity assessment daily.  - Set and communicate daily mobility goal to care team and patient/family/caregiver.   - Collaborate with rehabilitation services on mobility goals if consulted  - Perform Range of Motion 3 times a day.  - Reposition patient every 2 hours.  - Dangle patient 3 times a day  - Stand patient 3 times a day  - Ambulate patient 3 times a day  - Out of bed to chair 3 times a day   - Out of bed for meals 3 times a day  - Out of bed for toileting  - Record patient progress and toleration of activity level   Outcome: Progressing     Problem: DISCHARGE PLANNING  Goal: Discharge to home or other facility with appropriate resources  Description: INTERVENTIONS:  - Identify barriers to discharge w/patient and caregiver  -  Arrange for needed discharge resources and transportation as appropriate  - Identify discharge learning needs (meds, wound care, etc.)  - Arrange for interpretive services to assist at discharge as needed  - Refer to Case Management Department for coordinating discharge planning if the patient needs post-hospital services based on physician/advanced practitioner order or complex needs related to functional status, cognitive ability, or social support system  Outcome: Progressing     Problem: Knowledge Deficit  Goal: Patient/family/caregiver demonstrates understanding of disease process, treatment plan, medications, and discharge instructions  Description: Complete learning assessment and assess knowledge base.  Interventions:  - Provide teaching at level of understanding  - Provide teaching via preferred learning methods  Outcome: Progressing     Problem: Prexisting or High Potential for Compromised Skin Integrity  Goal: Skin integrity is maintained or improved  Description: INTERVENTIONS:  - Identify patients at risk for skin breakdown  - Assess and monitor skin integrity  - Assess and monitor nutrition and hydration status  - Monitor labs   - Assess for incontinence   - Turn and reposition patient  - Assist with mobility/ambulation  - Relieve pressure over bony prominences  - Avoid friction and shearing  - Provide appropriate hygiene as needed including keeping skin clean and dry  - Evaluate need for skin moisturizer/barrier cream  - Collaborate with interdisciplinary team   - Patient/family teaching  - Consider wound care consult   Outcome: Progressing

## 2024-01-16 LAB
2HR DELTA HS TROPONIN: -8 NG/L
ACANTHOCYTES BLD QL SMEAR: PRESENT
ANION GAP SERPL CALCULATED.3IONS-SCNC: 6 MMOL/L
ANISOCYTOSIS BLD QL SMEAR: PRESENT
ATRIAL RATE: 107 BPM
BASOPHILS # BLD MANUAL: 0 THOUSAND/UL (ref 0–0.1)
BASOPHILS NFR MAR MANUAL: 0 % (ref 0–1)
BUN SERPL-MCNC: 20 MG/DL (ref 5–25)
CALCIUM SERPL-MCNC: 9.1 MG/DL (ref 8.4–10.2)
CARDIAC TROPONIN I PNL SERPL HS: 32 NG/L
CARDIAC TROPONIN I PNL SERPL HS: 40 NG/L
CHLORIDE SERPL-SCNC: 104 MMOL/L (ref 96–108)
CO2 SERPL-SCNC: 31 MMOL/L (ref 21–32)
CREAT SERPL-MCNC: 1.37 MG/DL (ref 0.6–1.3)
EOSINOPHIL # BLD MANUAL: 0 THOUSAND/UL (ref 0–0.4)
EOSINOPHIL NFR BLD MANUAL: 0 % (ref 0–6)
ERYTHROCYTE [DISTWIDTH] IN BLOOD BY AUTOMATED COUNT: 17.8 % (ref 11.6–15.1)
GFR SERPL CREATININE-BSD FRML MDRD: 48 ML/MIN/1.73SQ M
GLUCOSE SERPL-MCNC: 138 MG/DL (ref 65–140)
HCT VFR BLD AUTO: 44 % (ref 36.5–49.3)
HGB BLD-MCNC: 13.2 G/DL (ref 12–17)
LYMPHOCYTES # BLD AUTO: 0.33 THOUSAND/UL (ref 0.6–4.47)
LYMPHOCYTES # BLD AUTO: 3 % (ref 14–44)
MACROCYTES BLD QL AUTO: PRESENT
MCH RBC QN AUTO: 31.6 PG (ref 26.8–34.3)
MCHC RBC AUTO-ENTMCNC: 30 G/DL (ref 31.4–37.4)
MCV RBC AUTO: 105 FL (ref 82–98)
MONOCYTES # BLD AUTO: 0.33 THOUSAND/UL (ref 0–1.22)
MONOCYTES NFR BLD: 3 % (ref 4–12)
NEUTROPHILS # BLD MANUAL: 10.3 THOUSAND/UL (ref 1.85–7.62)
NEUTS SEG NFR BLD AUTO: 94 % (ref 43–75)
OVALOCYTES BLD QL SMEAR: PRESENT
P AXIS: 78 DEGREES
PLATELET # BLD AUTO: 161 THOUSANDS/UL (ref 149–390)
PLATELET BLD QL SMEAR: ADEQUATE
PMV BLD AUTO: 11 FL (ref 8.9–12.7)
POTASSIUM SERPL-SCNC: 4.8 MMOL/L (ref 3.5–5.3)
PR INTERVAL: 154 MS
PROCALCITONIN SERPL-MCNC: 0.12 NG/ML
QRS AXIS: 77 DEGREES
QRSD INTERVAL: 96 MS
QT INTERVAL: 358 MS
QTC INTERVAL: 477 MS
RBC # BLD AUTO: 4.18 MILLION/UL (ref 3.88–5.62)
RBC MORPH BLD: PRESENT
SODIUM SERPL-SCNC: 141 MMOL/L (ref 135–147)
T WAVE AXIS: -16 DEGREES
VENTRICULAR RATE: 107 BPM
WBC # BLD AUTO: 10.96 THOUSAND/UL (ref 4.31–10.16)

## 2024-01-16 PROCEDURE — 94760 N-INVAS EAR/PLS OXIMETRY 1: CPT

## 2024-01-16 PROCEDURE — 94640 AIRWAY INHALATION TREATMENT: CPT

## 2024-01-16 PROCEDURE — 93005 ELECTROCARDIOGRAM TRACING: CPT

## 2024-01-16 PROCEDURE — 93010 ELECTROCARDIOGRAM REPORT: CPT | Performed by: INTERNAL MEDICINE

## 2024-01-16 PROCEDURE — 84484 ASSAY OF TROPONIN QUANT: CPT | Performed by: PHYSICIAN ASSISTANT

## 2024-01-16 PROCEDURE — 94664 DEMO&/EVAL PT USE INHALER: CPT

## 2024-01-16 PROCEDURE — 80048 BASIC METABOLIC PNL TOTAL CA: CPT | Performed by: INTERNAL MEDICINE

## 2024-01-16 PROCEDURE — 92610 EVALUATE SWALLOWING FUNCTION: CPT

## 2024-01-16 PROCEDURE — 85027 COMPLETE CBC AUTOMATED: CPT | Performed by: INTERNAL MEDICINE

## 2024-01-16 PROCEDURE — 84145 PROCALCITONIN (PCT): CPT | Performed by: INTERNAL MEDICINE

## 2024-01-16 PROCEDURE — 85007 BL SMEAR W/DIFF WBC COUNT: CPT | Performed by: INTERNAL MEDICINE

## 2024-01-16 PROCEDURE — 99232 SBSQ HOSP IP/OBS MODERATE 35: CPT | Performed by: INTERNAL MEDICINE

## 2024-01-16 RX ORDER — FUROSEMIDE 20 MG/1
20 TABLET ORAL DAILY
Status: DISCONTINUED | OUTPATIENT
Start: 2024-01-17 | End: 2024-01-17 | Stop reason: HOSPADM

## 2024-01-16 RX ORDER — CEFAZOLIN SODIUM 2 G/50ML
2000 SOLUTION INTRAVENOUS EVERY 8 HOURS
Status: DISCONTINUED | OUTPATIENT
Start: 2024-01-16 | End: 2024-01-17 | Stop reason: HOSPADM

## 2024-01-16 RX ADMIN — PANTOPRAZOLE SODIUM 20 MG: 20 TABLET, DELAYED RELEASE ORAL at 07:35

## 2024-01-16 RX ADMIN — CEFAZOLIN SODIUM 2000 MG: 2 SOLUTION INTRAVENOUS at 23:41

## 2024-01-16 RX ADMIN — FLUTICASONE PROPIONATE 1 SPRAY: 50 SPRAY, METERED NASAL at 17:11

## 2024-01-16 RX ADMIN — FAMOTIDINE 20 MG: 20 TABLET ORAL at 22:28

## 2024-01-16 RX ADMIN — EMPAGLIFLOZIN 10 MG: 10 TABLET, FILM COATED ORAL at 08:54

## 2024-01-16 RX ADMIN — ATORVASTATIN CALCIUM 80 MG: 80 TABLET, FILM COATED ORAL at 17:11

## 2024-01-16 RX ADMIN — CEFAZOLIN SODIUM 2000 MG: 2 SOLUTION INTRAVENOUS at 17:11

## 2024-01-16 RX ADMIN — SODIUM CHLORIDE SOLN NEBU 3% 4 ML: 3 NEBU SOLN at 07:23

## 2024-01-16 RX ADMIN — FORMOTEROL FUMARATE DIHYDRATE 20 MCG: 20 SOLUTION RESPIRATORY (INHALATION) at 08:30

## 2024-01-16 RX ADMIN — GUAIFENESIN AND DEXTROMETHORPHAN 5 ML: 100; 10 SYRUP ORAL at 22:37

## 2024-01-16 RX ADMIN — GABAPENTIN 300 MG: 300 CAPSULE ORAL at 22:27

## 2024-01-16 RX ADMIN — Medication 1000 UNITS: at 08:48

## 2024-01-16 RX ADMIN — LEVALBUTEROL HYDROCHLORIDE 1.25 MG: 1.25 SOLUTION RESPIRATORY (INHALATION) at 07:23

## 2024-01-16 RX ADMIN — PREDNISONE 30 MG: 20 TABLET ORAL at 08:48

## 2024-01-16 RX ADMIN — ASPIRIN 81 MG CHEWABLE TABLET 81 MG: 81 TABLET CHEWABLE at 22:27

## 2024-01-16 RX ADMIN — ALPRAZOLAM 0.25 MG: 0.25 TABLET ORAL at 22:27

## 2024-01-16 RX ADMIN — SALINE NASAL SPRAY 1 SPRAY: 1.5 SOLUTION NASAL at 22:29

## 2024-01-16 RX ADMIN — SODIUM CHLORIDE SOLN NEBU 3% 4 ML: 3 NEBU SOLN at 13:01

## 2024-01-16 RX ADMIN — ENOXAPARIN SODIUM 40 MG: 40 INJECTION SUBCUTANEOUS at 08:47

## 2024-01-16 RX ADMIN — FLUTICASONE PROPIONATE 1 SPRAY: 50 SPRAY, METERED NASAL at 08:47

## 2024-01-16 RX ADMIN — LEVALBUTEROL HYDROCHLORIDE 1.25 MG: 1.25 SOLUTION RESPIRATORY (INHALATION) at 13:01

## 2024-01-16 RX ADMIN — AZITHROMYCIN MONOHYDRATE 500 MG: 500 INJECTION, POWDER, LYOPHILIZED, FOR SOLUTION INTRAVENOUS at 05:12

## 2024-01-16 RX ADMIN — SODIUM CHLORIDE 250 ML: 0.9 INJECTION, SOLUTION INTRAVENOUS at 06:44

## 2024-01-16 RX ADMIN — BUDESONIDE INHALATION 0.5 MG: 0.5 SUSPENSION RESPIRATORY (INHALATION) at 21:18

## 2024-01-16 RX ADMIN — CEFEPIME HYDROCHLORIDE 1000 MG: 1 INJECTION, SOLUTION INTRAVENOUS at 07:36

## 2024-01-16 RX ADMIN — BUDESONIDE INHALATION 0.5 MG: 0.5 SUSPENSION RESPIRATORY (INHALATION) at 07:23

## 2024-01-16 RX ADMIN — LEVALBUTEROL HYDROCHLORIDE 1.25 MG: 1.25 SOLUTION RESPIRATORY (INHALATION) at 21:18

## 2024-01-16 RX ADMIN — POTASSIUM CHLORIDE 40 MEQ: 1.5 SOLUTION ORAL at 08:47

## 2024-01-16 RX ADMIN — FORMOTEROL FUMARATE DIHYDRATE 20 MCG: 20 SOLUTION RESPIRATORY (INHALATION) at 21:41

## 2024-01-16 NOTE — ASSESSMENT & PLAN NOTE
Wt Readings from Last 3 Encounters:   01/15/24 76.8 kg (169 lb 3.3 oz)   01/08/24 78 kg (172 lb)   01/04/24 77.6 kg (171 lb)     With component of fluid overload  Continue home medications  Was initially on IV Lasix, held due to elevated creatinine.  Resume Lasix tomorrow  Monitor PENG's and daily weight

## 2024-01-16 NOTE — RESPIRATORY THERAPY NOTE
01/15/24 2046   Respiratory Assessment   Resp Comments pts cpap ready for pt to self apply when ready w/ 3 l/m to mask   O2 Device NC   Non-Invasive Information   O2 Interface Device Nasal mask   Non-Invasive Ventilation Mode CPAP  (own unit)   Non-Invasive Settings   PEEP/CPAP (cm H2O) 10  (own unit)

## 2024-01-16 NOTE — NUTRITION
"   01/16/24 1336   Biochemical Data,Medical Tests, and Procedures   Biochemical Data/Medical Tests/Procedures Lab values reviewed;Meds reviewed   Labs (Comment) 1/16/2024 creatinine 1.37   Meds (Comment) Atorvastatin, vitamin D3, Pepcid, Protonix, potassium chloride, prednisone   Nutrition-Focused Physical Exam   Nutrition-Focused Physical Exam Findings RN skin assessment reviewed;Edema;No edema documented  (+2 bilateral lower extremity edema)   Medical-Related Concerns acute and chronic respiratory failure with hypoxia, LAMBERTO on CPAP, COPD, dysphagia, CHF, GERD, prostate cancer, lower extremity edema   Current PO Intake   Current Diet Order mechanical soft, 1500 mL fluid restricted, 2 g sodium diet, nectar thick liquids   Current Meal Intake 25-50%;50-75%   Estimated calorie intake compared to estimated need Nutrient needs are not met   Recommendations/Interventions   Malnutrition/BMI Present No  (1 criteria met-inadequate energy intake greater than 1 month)   Summary ST consulted.  Presents with shortness of breath.  Past medical history significant for acute and chronic respiratory failure with hypoxia, LAMBERTO on CPAP, COPD, dysphagia, CHF, GERD, prostate cancer, lower extremity edema.  Weight history reviewed.  Noted steady decline however no significant changes.  +2 bilateral lower extremity edema.  No pressure areas.  Prescribed a mechanical soft, 1500 mL fluid restricted, 2 g sodium diet, nectar thick liquids.  Meal completion 25-75%.  Anticipate nutrient needs are not met at this time.  Evaluated by  1/16-recommending diet as prescribed.  Met with patient at bedside.  He reports his appetite is so-so.  Usually has 2-4 meals daily, not regular timing.  Reports utilizing thickened liquids mostly.  Reports drinking 1 cup thin coffee daily.  Patient cooks and grocery shops with assistance from daughter/granddaughter.  Describes recent weight loss as not intentional, due to \"being sick.\"  When asked about frequency " "of food consumption last year versus this year he states \"I eat.\"  Declines Magic cup.  Agreeable to mighty shake at dinner.  RD to follow as hospital course progresses.   Interventions/Recommendations Continue current diet order;Supplement initiate   Education Assessment   Education Education not indicated at this time   Patient Nutrition Goals   Goal Wt maintained;Increase kcal/PRO intake;Adequate intake       "

## 2024-01-16 NOTE — PROGRESS NOTES
Former[facility]  Progress Note  Name: Seun Alva I  MRN: [id number]  Unit/Bed#: -01 I Date of Admission: 1/14/2024   Date of Service: 1/16/2024 I Hospital Day: 2    Assessment/Plan   Right lower lobe pneumonia  Assessment & Plan  Chest x-ray with right basilar opacity, possible aspiration  Sputum culture with Klebsiella pneumoniae sensitive to cefazolin  Cefepime/Zithromax discontinued  Blood cultures negative to date  COVID/flu/RSV negative  Strep pneumo/urine Legionella negative  Swallow eval appreciated  Continue oxygen as needed  Procalcitonin stable    Chronic systolic congestive heart failure (HCC)  Assessment & Plan  Wt Readings from Last 3 Encounters:   01/15/24 76.8 kg (169 lb 3.3 oz)   01/08/24 78 kg (172 lb)   01/04/24 77.6 kg (171 lb)     With component of fluid overload  Continue home medications  Was initially on IV Lasix, held due to elevated creatinine.  Resume Lasix tomorrow  Monitor PENG's and daily weight    Hypertensive heart and chronic kidney disease with heart failure and stage 1 through stage 4 chronic kidney disease, or unspecified chronic kidney disease (HCC)  Assessment & Plan  Wt Readings from Last 3 Encounters:   01/15/24 76.8 kg (169 lb 3.3 oz)   01/08/24 78 kg (172 lb)   01/04/24 77.6 kg (171 lb)     BP stable continue home medications  With component of acute kidney injury.  Diuretics held for now  Monitor urine output  Repeat BMP in the morning    Dysphagia  Assessment & Plan  Mechanical soft diet with nectar thick liquids.  Swallow eval appreciated    Elevated troponin  Assessment & Plan  Suspect secondary to hypoxia  Continue to trend and monitor  Cardiology input appreciated    COPD, severe (HCC)  Assessment & Plan  Patient with shortness of breath and wheezing was requiring BiPAP in the ED  Pulmonology input appreciated  Continue nebulizers, Pulmicort  Continue steroid taper  Currently back to home O2 requirement of 3 L nasal cannula    LAMBERTO on  CPAP  Assessment & Plan  CPAP At bedtime    Coronary artery disease involving native coronary artery of native heart without angina pectoris  Assessment & Plan  Continue home medications    * Acute and chronic respiratory failure with hypoxia (HCC)  Assessment & Plan  Patient presented to ED very hypoxic was on BiPAP initially and transitioned down to CPAP secondary to pneumonia and COPD exacerbation  Patient is chronically on 3 L nasal cannula at home  Patient has improved with steroids, diuresis, antibiotics  Pulmonology and cardiology input appreciated  Titrated back down to home O2 requirement  Continue prednisone taper  Diuretics held due to bump in creatinine           VTE Pharmacologic Prophylaxis: VTE Score: 7 Moderate Risk (Score 3-4) - Pharmacological DVT Prophylaxis Ordered: enoxaparin (Lovenox).    Mobility:   Basic Mobility Inpatient Raw Score: 17  JH-HLM Goal: 5: Stand one or more mins  JH-HLM Achieved: 6: Walk 10 steps or more  HLM Goal achieved. Continue to encourage appropriate mobility.    Patient Centered Rounds: I performed bedside rounds with nursing staff today.   Discussions with Specialists or Other Care Team Provider: yes    Education and Discussions with Family / Patient: Attempted to update  (daughter) via phone. Unable to contact.    Current Length of Stay: 2 day(s)  Current Patient Status: Inpatient   Certification Statement: The patient will continue to require additional inpatient hospital stay due to pneumonia  Discharge Plan: Anticipate discharge in 24-48 hrs to discharge location to be determined pending rehab evaluations.    Code Status: Level 3 - DNAR and DNI    Subjective:    no overnight events noted    Objective:     Vitals:   No data recorded.    HR:  [101-110] 102  Resp:  [18-20] 20  BP: ()/(50-69) 93/52  SpO2:  [88 %-100 %] 100 %  Body mass index is 25.73 kg/m².     Input and Output Summary (last 24 hours):     Intake/Output Summary (Last 24 hours) at  1/16/2024 1456  Last data filed at 1/16/2024 1300  Gross per 24 hour   Intake 240 ml   Output --   Net 240 ml       Physical Exam:   Physical Exam  Constitutional:       General: He is not in acute distress.  HENT:      Head: Normocephalic and atraumatic.      Nose: Nose normal.      Mouth/Throat:      Mouth: Mucous membranes are moist.   Eyes:      Extraocular Movements: Extraocular movements intact.      Conjunctiva/sclera: Conjunctivae normal.   Cardiovascular:      Rate and Rhythm: Normal rate and regular rhythm.   Pulmonary:      Effort: Pulmonary effort is normal. No respiratory distress.   Abdominal:      Palpations: Abdomen is soft.      Tenderness: There is no abdominal tenderness.   Musculoskeletal:         General: Normal range of motion.      Cervical back: Normal range of motion and neck supple.      Comments: Generalized weakness   Skin:     General: Skin is warm and dry.   Neurological:      General: No focal deficit present.      Mental Status: He is alert. Mental status is at baseline.      Cranial Nerves: No cranial nerve deficit.   Psychiatric:         Mood and Affect: Mood normal.         Behavior: Behavior normal.          Additional Data:     Labs:  Results from last 7 days   Lab Units 01/16/24  0511 01/15/24  0555   WBC Thousand/uL 10.96* 9.24   HEMOGLOBIN g/dL 13.2 11.5*   HEMATOCRIT % 44.0 36.5   PLATELETS Thousands/uL 161 172   NEUTROS PCT %  --  89*   LYMPHS PCT %  --  3*   LYMPHO PCT % 3*  --    MONOS PCT %  --  8   MONO PCT % 3*  --    EOS PCT % 0 0     Results from last 7 days   Lab Units 01/16/24  0511 01/15/24  0852   SODIUM mmol/L 141 142   POTASSIUM mmol/L 4.8 4.6   CHLORIDE mmol/L 104 102   CO2 mmol/L 31 32   BUN mg/dL 20 18   CREATININE mg/dL 1.37* 1.55*   ANION GAP mmol/L 6 8   CALCIUM mg/dL 9.1 9.3   ALBUMIN g/dL  --  3.7   TOTAL BILIRUBIN mg/dL  --  1.01*   ALK PHOS U/L  --  59   ALT U/L  --  30   AST U/L  --  40*   GLUCOSE RANDOM mg/dL 138 185*     Results from last 7 days    Lab Units 01/14/24  0421   INR  1.18             Results from last 7 days   Lab Units 01/16/24  0511 01/15/24  0852 01/14/24  0421   LACTIC ACID mmol/L  --   --  0.7   PROCALCITONIN ng/ml 0.12 0.13 0.09       Lines/Drains:  Invasive Devices       Peripheral Intravenous Line  Duration             Peripheral IV 01/14/24 Left Antecubital 2 days                          Imaging: No pertinent imaging reviewed.    Recent Cultures (last 7 days):   Results from last 7 days   Lab Units 01/14/24  1821 01/14/24  0421 01/14/24  0318   BLOOD CULTURE   --  No Growth at 24 hrs.  No Growth at 24 hrs.  --    SPUTUM CULTURE   --   --  3+ Growth of Klebsiella pneumoniae*   GRAM STAIN RESULT   --   --  2+ Epithelial cells per low power field*  No polys seen*  2+ Gram positive cocci in pairs and chains*  2+ Yeast*   LEGIONELLA URINARY ANTIGEN  Negative  --   --        Last 24 Hours Medication List:   Current Facility-Administered Medications   Medication Dose Route Frequency Provider Last Rate    acetaminophen  650 mg Oral Q6H PRN Christen Larry PA-C      ALPRAZolam  0.25 mg Oral HS PRN Christen Larry PA-C      aspirin  81 mg Oral HS Christen Larry PA-C      atorvastatin  80 mg Oral Daily With Dinner Christen Larry PA-C      budesonide  0.5 mg Nebulization BID Tesha Avila DO      calcium carbonate  1,000 mg Oral BID PRN Christen Larry PA-C      cefazolin  2,000 mg Intravenous Q8H Hyacinth Espana MD      cholecalciferol  1,000 Units Oral Daily Christen Larry PA-C      dextromethorphan-guaiFENesin  5 mL Oral TID PRN Christen Larry PA-C      Empagliflozin  10 mg Oral Daily Christen Larry PA-C      enoxaparin  40 mg Subcutaneous Daily Christen Larry PA-C      famotidine  20 mg Oral HS Christen Larry PA-C      fluticasone  1 spray Nasal BID Christen Larry PA-C      formoterol  20 mcg Nebulization BID Tesha Avila DO       [START ON 1/17/2024] furosemide  20 mg Oral Daily Rubio Pinedaane, DO      gabapentin  300 mg Oral HS Christen Larry PA-C      HYDROcodone-acetaminophen  1 tablet Oral Q6H PRN Christen Larry PA-C      levalbuterol  1.25 mg Nebulization TID Briseydaji Tolbertterese, DO      methocarbamol  500 mg Oral TID PRN Christen Larry PA-C      metoprolol succinate  12.5 mg Oral BID Christen Larry PA-C      ondansetron  4 mg Intravenous Q6H PRN Christen Larry PA-C      pantoprazole  20 mg Oral Early Morning Christen Larry PA-C      potassium chloride  40 mEq Oral Daily Christen Larry PA-C      predniSONE  30 mg Oral Daily Christen Larry PA-C      Followed by    [START ON 1/19/2024] predniSONE  20 mg Oral Daily Christen Larry PA-C      Followed by    [START ON 1/22/2024] predniSONE  10 mg Oral Daily Christen Larry PA-C      Followed by    [START ON 1/25/2024] predniSONE  5 mg Oral Daily Christen Larry PA-C      sodium chloride  1 spray Each Nare Q1H PRN Christen Larry PA-C      sodium chloride (PF)  3 mL Intravenous Q1H PRN Bryce Gong MD      sodium chloride  1 mL Intravenous Once Bryce Gong MD          Today, Patient Was Seen By: Hyacinth Espana MD    **Please Note: This note may have been constructed using a voice recognition system.**

## 2024-01-16 NOTE — ASSESSMENT & PLAN NOTE
Chest x-ray with right basilar opacity, possible aspiration  Sputum culture with Klebsiella pneumoniae sensitive to cefazolin  Cefepime/Zithromax discontinued  Blood cultures negative to date  COVID/flu/RSV negative  Strep pneumo/urine Legionella negative  Swallow eval appreciated  Continue oxygen as needed  Procalcitonin stable

## 2024-01-16 NOTE — NURSING NOTE
Patient woke up complaining of L chest pain and pressure mid/ upper belly region, low bp. Hospitalist  notified and  bolus ordered and initiated.

## 2024-01-16 NOTE — PROGRESS NOTES
[facility]  Progress Note  Name: Seun Alva I  MRN: [id number]  Unit/Bed#: -01 I Date of Admission: 1/14/2024   Date of Service: 1/16/2024 I Hospital Day: 2    Assessment/Plan   Chronic systolic congestive heart failure (HCC)  Assessment & Plan  Wt Readings from Last 3 Encounters:   01/15/24 76.8 kg (169 lb 3.3 oz)   01/08/24 78 kg (172 lb)   01/04/24 77.6 kg (171 lb)     -Mild decompensation on admission   -Patient was even seen and evaluated by advanced heart failure team with limited options moving forward particularly given low blood pressure and overall frailty  -Will attempt to reinitiate home diuretic regimen if blood pressure allows      Right lower lobe pneumonia  Assessment & Plan  -Continue antibiotic regimen per medical team    COPD, severe (HCC)  Assessment & Plan  -Plan of care per primary team and pulmonology  -Patient currently on antibiotic therapy    * Acute and chronic respiratory failure with hypoxia (HCC)  Assessment & Plan  -Likely multifactorial in the setting of known significant COPD on nasal cannula oxygen at baseline along with heart failure with reduced ejection fraction and possible pneumonia  -Continue medical therapy at this time  -Will attempt to reinitiate home diuretic regimen with furosemide 20 mg daily if blood pressure allows          Subjective:   Patient seen and examined.  Per patient, he currently denies any chest pain, palpitations, lightness or dizziness and notes that shortness of breath is at his chronic baseline.  He notes overall some improvement in symptoms but still has some abdominal discomfort seen mostly in the epigastric region worse with palpation but states this overall is also improving.      Summary comments:  -Continue aspirin 81 mg daily, atorvastatin 80 mg daily, and will reinitiate furosemide 20 mg daily  -Steroid and antibiotic therapy per primary team and pulmonology  -Will continue to follow-up laboratory study  "results and monitor patient at this time  -Unfortunately difficult to uptitrate medical therapy for patient given blood pressure issues could consider midodrine if continuing to be hypotensive        Vitals: Blood pressure 93/52, pulse 102, temperature 97.7 °F (36.5 °C), resp. rate 20, height 5' 8\" (1.727 m), weight 76.8 kg (169 lb 3.3 oz), SpO2 100%.,   Orthostatic Blood Pressures      Flowsheet Row Most Recent Value   Blood Pressure 93/52 filed at 01/16/2024 0848   Patient Position - Orthostatic VS Lying filed at 01/16/2024 0634        ,   Weight (last 2 days)       Date/Time Weight    01/15/24 0500 76.8 (169.2)    01/14/24 0708 78 (171.96)            Physical Exam:  Physical Exam  Vitals reviewed.   Constitutional:       General: He is not in acute distress.     Appearance: He is not diaphoretic.   HENT:      Head: Normocephalic and atraumatic.      Comments: Nasal cannula oxygen in place  Eyes:      General:         Right eye: No discharge.         Left eye: No discharge.   Neck:      Comments: Trachea midline, minimal JVD present  Cardiovascular:      Rate and Rhythm: Normal rate and regular rhythm.      Heart sounds: Murmur (BRENDEN) heard.   Pulmonary:      Effort: No respiratory distress.      Breath sounds: Wheezing present.      Comments: Decreased breath sounds bilaterally  Chest:      Chest wall: No tenderness.   Abdominal:      General: Bowel sounds are normal.      Palpations: Abdomen is soft.      Tenderness: There is abdominal tenderness (in epigastric region). There is no rebound.   Musculoskeletal:      Right lower leg: Edema present.      Left lower leg: Edema present.   Skin:     General: Skin is warm and dry.   Neurological:      Mental Status: He is alert.      Comments: Awake, alert, able answer questions appropriately, hard of hearing   Psychiatric:         Mood and Affect: Mood normal.         Behavior: Behavior normal.        Medications:      Current Facility-Administered Medications:     " acetaminophen (TYLENOL) tablet 650 mg, 650 mg, Oral, Q6H PRN, Christen Larry PA-C, 650 mg at 01/15/24 2121    ALPRAZolam (XANAX) tablet 0.25 mg, 0.25 mg, Oral, HS PRN, Christen Larry PA-C, 0.25 mg at 01/15/24 2322    aspirin chewable tablet 81 mg, 81 mg, Oral, HS, Christen Larry PA-C, 81 mg at 01/15/24 2120    atorvastatin (LIPITOR) tablet 80 mg, 80 mg, Oral, Daily With Dinner, Christen Larry PA-C, 80 mg at 01/15/24 1549    azithromycin (ZITHROMAX) 500 mg in sodium chloride 0.9 % 250 mL IVPB, 500 mg, Intravenous, Q24H, Christen Larry PA-C, Last Rate: 250 mL/hr at 01/16/24 0512, 500 mg at 01/16/24 0512    budesonide (PULMICORT) inhalation solution 0.5 mg, 0.5 mg, Nebulization, BID, Tesha Avila DO, 0.5 mg at 01/16/24 0723    calcium carbonate (TUMS) chewable tablet 1,000 mg, 1,000 mg, Oral, BID PRN, Christen Larry PA-C, 1,000 mg at 01/14/24 0623    cefepime (MAXIPIME) IVPB (premix in dextrose) 1,000 mg 50 mL, 1,000 mg, Intravenous, Q12H, Tesha Avila DO, Last Rate: 100 mL/hr at 01/16/24 0736, 1,000 mg at 01/16/24 0736    cholecalciferol (VITAMIN D3) tablet 1,000 Units, 1,000 Units, Oral, Daily, Christen Larry PA-C, 1,000 Units at 01/16/24 0848    dextromethorphan-guaiFENesin (ROBITUSSIN DM) oral syrup 5 mL, 5 mL, Oral, TID PRN, Christen Larry PA-C    Empagliflozin (JARDIANCE) tablet 10 mg, 10 mg, Oral, Daily, Christen Larry PA-C, 10 mg at 01/16/24 0854    enoxaparin (LOVENOX) subcutaneous injection 40 mg, 40 mg, Subcutaneous, Daily, Christen Larry PA-C, 40 mg at 01/16/24 0847    famotidine (PEPCID) tablet 20 mg, 20 mg, Oral, HS, Christen Larry PA-C, 20 mg at 01/15/24 2121    fluticasone (FLONASE) 50 mcg/act nasal spray 1 spray, 1 spray, Nasal, BID, Christen Larry PA-C, 1 spray at 01/16/24 0847    formoterol (PERFOROMIST) nebulizer solution 20 mcg, 20 mcg, Nebulization, BID, Tesha Avila DO, 20 mcg  at 01/16/24 0830    gabapentin (NEURONTIN) capsule 300 mg, 300 mg, Oral, HS, Christen Larry PA-C, 300 mg at 01/15/24 2120    HYDROcodone-acetaminophen (NORCO) 5-325 mg per tablet 1 tablet, 1 tablet, Oral, Q6H PRN, Christen Larry PA-C    levalbuterol (XOPENEX) inhalation solution 1.25 mg, 1.25 mg, Nebulization, TID, Tesha Avila DO, 1.25 mg at 01/16/24 0723    methocarbamol (ROBAXIN) tablet 500 mg, 500 mg, Oral, TID PRN, Christen Larry PA-C    metoprolol succinate (TOPROL-XL) 24 hr tablet 12.5 mg, 12.5 mg, Oral, BID, Christen Larry PA-C, 12.5 mg at 01/15/24 2119    ondansetron (ZOFRAN) injection 4 mg, 4 mg, Intravenous, Q6H PRN, Christen Larry PA-C    pantoprazole (PROTONIX) EC tablet 20 mg, 20 mg, Oral, Early Morning, Christen Larry PA-C, 20 mg at 01/16/24 0735    potassium chloride oral solution 40 mEq, 40 mEq, Oral, Daily, Christen Larry PA-C, 40 mEq at 01/16/24 0847    [COMPLETED] predniSONE tablet 40 mg, 40 mg, Oral, Daily, 40 mg at 01/15/24 0917 **FOLLOWED BY** predniSONE tablet 30 mg, 30 mg, Oral, Daily, 30 mg at 01/16/24 0848 **FOLLOWED BY** [START ON 1/19/2024] predniSONE tablet 20 mg, 20 mg, Oral, Daily **FOLLOWED BY** [START ON 1/22/2024] predniSONE tablet 10 mg, 10 mg, Oral, Daily **FOLLOWED BY** [START ON 1/25/2024] predniSONE tablet 5 mg, 5 mg, Oral, Daily, Christen Larry PA-C    sodium chloride (OCEAN) 0.65 % nasal spray 1 spray, 1 spray, Each Nare, Q1H PRN, LEONCIO Siddiqi-C, 1 spray at 01/14/24 1806    Insert peripheral IV, , , Once **AND** sodium chloride (PF) 0.9 % injection 3 mL, 3 mL, Intravenous, Q1H PRN, Bryce Gong MD    sodium chloride 0.9 % bolus 1 mL, 1 mL, Intravenous, Once, Bryce Gong MD    sodium chloride 3 % inhalation solution 4 mL, 4 mL, Nebulization, TID, Tesha Avila DO, 4 mL at 01/16/24 0724     Labs & Results:    Troponins:    Results from last 7 days   Lab Units 01/16/24  0759  01/14/24  0421   HS TNI 0HR ng/L 40  --    HS TNI 2HR ng/L  --  37   HSTNI D2 ng/L  --  -13   HS TNI 4HR ng/L  --  38   HSTNI D4 ng/L  --  -12        BNP:   Results from last 6 Months   Lab Units 01/14/24  0217 10/21/23  1639   BNP pg/mL 3,360* 1,892*     CBC with diff:   Results from last 7 days   Lab Units 01/16/24  0511 01/15/24  0555   WBC Thousand/uL 10.96* 9.24   HEMOGLOBIN g/dL 13.2 11.5*   HEMATOCRIT % 44.0 36.5   MCV fL 105* 104*   PLATELETS Thousands/uL 161 172     TSH:     CMP:   Results from last 7 days   Lab Units 01/16/24 0511 01/15/24  0852 01/14/24 0217   POTASSIUM mmol/L 4.8 4.6 4.4   CHLORIDE mmol/L 104 102 101   CO2 mmol/L 31 32 30   BUN mg/dL 20 18 18   CREATININE mg/dL 1.37* 1.55* 1.65*   AST U/L  --  40* 28   ALT U/L  --  30 22   EGFR ml/min/1.73sq m 48 41 38     Lipid Profile:     Coags:   Results from last 7 days   Lab Units 01/14/24 0421   INR  1.18

## 2024-01-16 NOTE — SPEECH THERAPY NOTE
Speech Language/Pathology  Speech/Language Pathology  Assessment    Patient Name: Seun Alva  Today's Date: 1/16/2024     Problem List  Principal Problem:    Acute and chronic respiratory failure with hypoxia (HCC)  Active Problems:    Coronary artery disease involving native coronary artery of native heart without angina pectoris    LAMBERTO on CPAP    COPD, severe (HCC)    Right lower lobe pneumonia    Elevated troponin    Dysphagia    Hypertensive heart and chronic kidney disease with heart failure and stage 1 through stage 4 chronic kidney disease, or unspecified chronic kidney disease (HCC)    Chronic systolic congestive heart failure (HCC)    Past Medical History  Past Medical History:   Diagnosis Date    Abnormal ECG 2021 OR 2022    Alcoholism (HCC) 1984    sober 38 years    Arthritis 2008    beginning    Bilateral carotid artery stenosis     Callus     Cancer (HCC)     skin    Chronic diastolic heart failure (HCC)     Chronic ischemic heart disease     Chronic kidney disease     stage 3    Colon polyp     COPD (chronic obstructive pulmonary disease) (HCC)     Coronary artery disease     hx stents, MI, PCI    COVID 11/2021    COVID 12/01/2023    CPAP (continuous positive airway pressure) dependence     Disease of thyroid gland 2017    nodules    Emphysema of lung (HCC) 1995    started    Hearing loss     History of transfusion 1995    Hyperlipidemia     Hypertension     Intracranial aneurysm 04/25/2023    Lung nodule 2023    x rays    MI (myocardial infarction) (HCC) 1995    Myocardial infarction (HCC) 1995    Pneumonia     Pneumothorax 02/20/2023    collapsed lung    Prostate cancer (HCC)     RSV (respiratory syncytial virus infection) 10/2022    S/P carotid endarterectomy     Shortness of breath     O2 2 l/nc PRN    Sleep apnea     Sleep apnea, obstructive     Stented coronary artery      Past Surgical History  Past Surgical History:   Procedure Laterality Date    APPENDECTOMY      CARDIAC CATHETERIZATION   03/18/2021    left main with no significant disease, proximal LAD with 10% stenosis at the site of prior stent, left circumflex artery with minimal luminal irregularities mid RCA with 50% stenosis at site of prior stent and PL segment with distal disease supplied by collaterals from the distal circumflex with no significant CAD requiring revascularization at that time.    CATARACT EXTRACTION, BILATERAL Bilateral     COLONOSCOPY      CORONARY ANGIOPLASTY WITH STENT PLACEMENT  1999    RCA    CORONARY ANGIOPLASTY WITH STENT PLACEMENT  2001    RCA    CORONARY ANGIOPLASTY WITH STENT PLACEMENT  2003    LAD    EYE SURGERY      OK BX PROSTATE STRTCTC SATURATION SAMPLING IMG GID N/A 09/13/2022    Procedure: TRANSPERINEAL MRI FUSION  BIOPSY PROSTATE;  Surgeon: Mele Patel MD;  Location: [facility];  Service: Urology    OK NEUROPLASTY &/TRANSPOS MEDIAN NRV CARPAL TUNNE Left 07/22/2022    Procedure: RELEASE CARPAL TUNNEL;  Surgeon: Matty Mcgowan MD;  Location: [facility];  Service: Orthopedics    OK NEUROPLASTY &/TRANSPOSITION ULNAR NERVE ELBOW Left 07/22/2022    Procedure: RELEASE CUBITAL TUNNEL;  Surgeon: Matty Mcgowan MD;  Location[facility];  Service: Orthopedics    OK NEUROPLASTY &/TRANSPOSITION ULNAR NERVE WRIST Left 07/22/2022    Procedure: GUYON'S CANAL RELEASE;  Surgeon: Matty Mcgowan MD;  Location: [facility];  Service: Orthopedics    SKIN CANCER EXCISION  2012    chin-per pt, basal cell  also right ankle    TONSILLECTOMY  no      Bedside Swallow Evaluation:    Summary:  Pt presented w/ mild oral and  suspect mild pharyngeal dysphagia for current diet level. Pt positioned upright and alert. [place]. He fed himself mech soft pork w/ gravy, mashed potatoes, broccoli, and nectar thick liquids via cup. Mastication was mildly prolonged however functional breakdown. Bolus control, formation, and transfer were WNL. Swallows appeared prompt. No immediate cough or throat clear. Pt did present with loud audible  swallows. Reported that he does use thickener at home but will drink coffee and eat soup without it. Stated his food at home is not quite as soft of the [facility] soft consistency here. Did report he difficulties with swallowing medications whole with ntl liquids. ST reviewed recommendations and safe swallow strategies, Pt understood.      Recommendations:  Diet: Dysphagia 2 Mech soft   Liquid: Nectar   Meds: if small whole with puree, break/crush if large  Supervision: Intermittent   Positioning:Upright  Strategies: slow rate, alternate liquids with solids, swallow prior to additional po  Pt to take PO/Meds only when fully alert and upright.   Oral care  Aspiration precautions  Reflux precautions  Therapy Prognosis:age, medical status  Prognosis considerations: fair  Frequency: 2-5 times weekly as indicated       Consider consult w/:  Rehab  Nutrition    Goal(s):  Dysphagia LTG  -Patient will demonstrate safe and effective oral intake (without overt s/s significant oral/pharyngeal dysphagia including s/s penetration or aspiration) for the highest appropriate diet level.     1.Pt will tolerate least restrictive diet w/out s/s aspiration or oral/pharyngeal difficulties.   2.Pt will will effectively manipulate/masticate and transfer purees/solids w/out s/s dysphagia/aspiration.   3.Pt will tolerate thin liquids w/out s/s aspiration.   -If indicated, patient will comply with a Video/Modified Barium Swallow study for more complete assessment of swallowing anatomy/physiology/aspiration risk and to assess efficacy of treatment techniques so as to best guide treatment plan     H&P/Admit info/ pertinent provider notes: (PMH noted above)  Seun Alva is a 78 y.o. male with a PMH of COPD with 3 L nasal cannula obstructive sleep apnea on CPAP, chronic systolic CHF with EF 35%, chronic kidney disease stage IIIa, CAD, hypertension, hyperlipidemia who presents with shortness of breath. Patient reports was not feeling well for  "about a week, last night he noted his breathing was bad. He had some chest discomfort and took 2 nitro. Rates as a 3-4, left sided, hx of 10 stents. Notes nasal congestion. Had soft stool today. Had increased weakness and trouble breathing was using his walker at home. Has not gone out recently. Patient reports trouble swallowing \"flap doesn't work right on back of my throat\" Started on prednisone with Pulmonary during office visit.        Special Studies:  XR chest 1 view portable (01/14/2023) Impression   Right basilar opacity concerning for pulmonary infiltrate including aspiration. Small associated right basilar effusion.       Procalcitonin: 0.12                         01/16/2024   WBC: 10.96                      01/16/2024     Code Status  Level 3 DN    Previous MBS: Completed @ [employer] [facility] 06/22/2023  Oral stage:  Pt presented with minimal oral stage dysphagia.  Mastication was intentionally prolonged, but grossly effective with materials administered today. Bolus formation and transfer were functional.  Oral control appeared adequate with no gross premature spillage over the base of tongue.   Pharyngeal stage:  Pt presented with mild pharyngeal dysphagia.   Velar elevation noted. Swallowing initiation was mild-moderately delayed, with posterior spillage to vallecular and pyriform sinus.  Mild-moderately reduced laryngeal rise and anterior hyoid excursion noted. Airway entrance closure appeared reduced, specifically with successive thin liquids via straw and reduced epiglottic inversion. Tongue base retraction and pharyngeal constriction appeared minimally reduced. PES opening was adequate.  Management of food/liquid/barium tablet follows:   Transient laryngeal penetration occurred with thin liquids intermittently and shivani aspiration occurred with sequential thin via straw.   Strategies and Efficacy: small sips minimized pharyngeal retention, secondary swallow improved removal of pharyngeal " retention  Aspiration Response and Efficacy:  No cough response noted to aspiration.  Esophageal stage:  Brief view of esophagus was completed after administration of the barium tablet.  Esophageal phase unremarkable.  Assessment Summary:    Pt presents with mild oropharyngeal dysphagia characterized by swallow initiation delay, reduced laryngeal excursion, reduced epiglottic inversion and airway closure, as well as reduced TBR and pharyngeal constriction. Use of small sips minimizes risk of asp/pen and pharyngeal retention, while use of secondary swallow improves pharyngeal clearance with large sips. Silent aspiration occurred with sequential sips of thin via straw.  Note: Images are available for review in PACS as desired.  Recommendations:   Recommended Diet:  regular diet and thin liquids   Recommended Form of Medications: whole with liquid and whole with puree   Aspiration precautions and compensatory swallowing strategies: small bites/sips, no straws and secondary swallow  Consider referral to:  None at this time  SLP Dysphagia therapy recommended: yes for therapeutic strengthening exercises    Patient's goal:    Did the pt report pain?  If yes, was nursing notified/was it addressed?    Reason for consult:  R/o aspiration  Determine safest and least restrictive diet  respiratory compromise  h/o dysphagia     Precautions:  Aspiration    Food Allergies: No known   Current Diet: [facility] 2 [facility] soft and nectar    Premorbid diet: Regular and thin   O2 requirement: NC 3 liters    Social/Prior living [place] with spouse    Voice/Speech: WNL, [place]   Follows commands: Basic   Cognitive status: Alert      Oral [facility] exam:  Dentition:Upper denture, lower partial   Lips (VII): WNL  Tongue (XII): midline  Mandible (V):adequate ROM  Face/oral sensation (V):symmetrical, WNL  Velum (X):WNL    Items administered:  [facility] soft pork with gravy, mashed potatoes, broccoli, and nectar thick liquids via cup    Oral stage:  Lip closure:  WNL  Mastication: adequate  Bolus formation:WNL  Bolus control:WNL  Transfer:WNL    Pharyngeal stage:  Swallow promptness: prompt   Laryngeal rise:adequate  Audible swallows: present with cup sips  No overt s/s aspiration    Esophageal stage:  No s/s reported  H/o GERD    Aspiration precautions posted    Results d/w:  Pt, nursing,

## 2024-01-16 NOTE — ASSESSMENT & PLAN NOTE
Wt Readings from Last 3 Encounters:   01/15/24 76.8 kg (169 lb 3.3 oz)   01/08/24 78 kg (172 lb)   01/04/24 77.6 kg (171 lb)     -Mild decompensation on admission   -Patient was even seen and evaluated by advanced heart failure team with limited options moving forward particularly given low blood pressure and overall frailty  -Will attempt to reinitiate home diuretic regimen if blood pressure allows

## 2024-01-16 NOTE — PLAN OF CARE
Problem: PAIN - ADULT  Goal: Verbalizes/displays adequate comfort level or baseline comfort level  Description: Interventions:  - Encourage patient to monitor pain and request assistance  - Assess pain using appropriate pain scale  - Administer analgesics based on type and severity of pain and evaluate response  - Implement non-pharmacological measures as appropriate and evaluate response  - Consider cultural and social influences on pain and pain management  - Notify physician/advanced practitioner if interventions unsuccessful or patient reports new pain  Outcome: Progressing     Problem: INFECTION - ADULT  Goal: Absence of fever/infection during neutropenic period  Description: INTERVENTIONS:  - Monitor WBC    Problem: Knowledge Deficit  Goal: Patient/family/caregiver demonstrates understanding of disease process, treatment plan, medications, and discharge instructions  Description: Complete learning assessment and assess knowledge base.  Interventions:  - Provide teaching at level of understanding  - Provide teaching via preferred learning methods  Outcome: Progressing     Problem: Prexisting or High Potential for Compromised Skin Integrity  Goal: Skin integrity is maintained or improved  Description: INTERVENTIONS:  - Identify patients at risk for skin breakdown  - Assess and monitor skin integrity  - Assess and monitor nutrition and hydration status  - Monitor labs   - Assess for incontinence   - Turn and reposition patient  - Assist with mobility/ambulation  - Relieve pressure over bony prominences  - Avoid friction and shearing  - Provide appropriate hygiene as needed including keeping skin clean and dry  - Evaluate need for skin moisturizer/barrier cream  - Collaborate with interdisciplinary team   - Patient/family teaching  - Consider wound care consult   Outcome: Progressing

## 2024-01-16 NOTE — ASSESSMENT & PLAN NOTE
-Likely multifactorial in the setting of known significant COPD on nasal cannula oxygen at baseline along with heart failure with reduced ejection fraction and possible pneumonia  -Continue medical therapy at this time  -Will attempt to reinitiate home diuretic regimen with furosemide 20 mg daily if blood pressure allows

## 2024-01-17 VITALS
DIASTOLIC BLOOD PRESSURE: 60 MMHG | HEIGHT: 68 IN | TEMPERATURE: 97.4 F | WEIGHT: 169.2 LBS | SYSTOLIC BLOOD PRESSURE: 101 MMHG | RESPIRATION RATE: 18 BRPM | OXYGEN SATURATION: 96 % | BODY MASS INDEX: 25.64 KG/M2 | HEART RATE: 96 BPM

## 2024-01-17 LAB
ANION GAP SERPL CALCULATED.3IONS-SCNC: 7 MMOL/L
BACTERIA SPT RESP CULT: ABNORMAL
BACTERIA SPT RESP CULT: ABNORMAL
BUN SERPL-MCNC: 23 MG/DL (ref 5–25)
CALCIUM SERPL-MCNC: 9 MG/DL (ref 8.4–10.2)
CHLORIDE SERPL-SCNC: 103 MMOL/L (ref 96–108)
CO2 SERPL-SCNC: 31 MMOL/L (ref 21–32)
CREAT SERPL-MCNC: 1.47 MG/DL (ref 0.6–1.3)
ERYTHROCYTE [DISTWIDTH] IN BLOOD BY AUTOMATED COUNT: 17.7 % (ref 11.6–15.1)
GFR SERPL CREATININE-BSD FRML MDRD: 44 ML/MIN/1.73SQ M
GLUCOSE SERPL-MCNC: 144 MG/DL (ref 65–140)
GRAM STN SPEC: ABNORMAL
HCT VFR BLD AUTO: 42.4 % (ref 36.5–49.3)
HGB BLD-MCNC: 12.8 G/DL (ref 12–17)
MCH RBC QN AUTO: 32.4 PG (ref 26.8–34.3)
MCHC RBC AUTO-ENTMCNC: 30.2 G/DL (ref 31.4–37.4)
MCV RBC AUTO: 107 FL (ref 82–98)
PLATELET # BLD AUTO: 158 THOUSANDS/UL (ref 149–390)
PMV BLD AUTO: 11.1 FL (ref 8.9–12.7)
POTASSIUM SERPL-SCNC: 4.9 MMOL/L (ref 3.5–5.3)
RBC # BLD AUTO: 3.95 MILLION/UL (ref 3.88–5.62)
SODIUM SERPL-SCNC: 141 MMOL/L (ref 135–147)
WBC # BLD AUTO: 12.57 THOUSAND/UL (ref 4.31–10.16)

## 2024-01-17 PROCEDURE — 94664 DEMO&/EVAL PT USE INHALER: CPT

## 2024-01-17 PROCEDURE — 85027 COMPLETE CBC AUTOMATED: CPT | Performed by: INTERNAL MEDICINE

## 2024-01-17 PROCEDURE — 99232 SBSQ HOSP IP/OBS MODERATE 35: CPT | Performed by: INTERNAL MEDICINE

## 2024-01-17 PROCEDURE — 80048 BASIC METABOLIC PNL TOTAL CA: CPT | Performed by: INTERNAL MEDICINE

## 2024-01-17 PROCEDURE — 97166 OT EVAL MOD COMPLEX 45 MIN: CPT

## 2024-01-17 PROCEDURE — 99239 HOSP IP/OBS DSCHRG MGMT >30: CPT | Performed by: INTERNAL MEDICINE

## 2024-01-17 PROCEDURE — 94640 AIRWAY INHALATION TREATMENT: CPT

## 2024-01-17 PROCEDURE — 94760 N-INVAS EAR/PLS OXIMETRY 1: CPT

## 2024-01-17 PROCEDURE — 97163 PT EVAL HIGH COMPLEX 45 MIN: CPT

## 2024-01-17 RX ORDER — PREDNISONE 10 MG/1
TABLET ORAL DAILY
Qty: 32 TABLET | Refills: 0 | Status: SHIPPED | OUTPATIENT
Start: 2024-01-17 | End: 2024-02-01

## 2024-01-17 RX ORDER — AMOXICILLIN AND CLAVULANATE POTASSIUM 875; 125 MG/1; MG/1
1 TABLET, FILM COATED ORAL EVERY 12 HOURS SCHEDULED
Qty: 10 TABLET | Refills: 0 | Status: SHIPPED | OUTPATIENT
Start: 2024-01-17 | End: 2024-01-22

## 2024-01-17 RX ORDER — HYDROXYZINE HYDROCHLORIDE 25 MG/1
25 TABLET, FILM COATED ORAL ONCE
Status: COMPLETED | OUTPATIENT
Start: 2024-01-17 | End: 2024-01-17

## 2024-01-17 RX ADMIN — EMPAGLIFLOZIN 10 MG: 10 TABLET, FILM COATED ORAL at 09:47

## 2024-01-17 RX ADMIN — LEVALBUTEROL HYDROCHLORIDE 1.25 MG: 1.25 SOLUTION RESPIRATORY (INHALATION) at 07:24

## 2024-01-17 RX ADMIN — HYDROXYZINE HYDROCHLORIDE 25 MG: 25 TABLET ORAL at 00:42

## 2024-01-17 RX ADMIN — CALCIUM CARBONATE (ANTACID) CHEW TAB 500 MG 1000 MG: 500 CHEW TAB at 00:46

## 2024-01-17 RX ADMIN — ATORVASTATIN CALCIUM 80 MG: 80 TABLET, FILM COATED ORAL at 15:55

## 2024-01-17 RX ADMIN — FLUTICASONE PROPIONATE 1 SPRAY: 50 SPRAY, METERED NASAL at 09:51

## 2024-01-17 RX ADMIN — LEVALBUTEROL HYDROCHLORIDE 1.25 MG: 1.25 SOLUTION RESPIRATORY (INHALATION) at 13:37

## 2024-01-17 RX ADMIN — BUDESONIDE INHALATION 0.5 MG: 0.5 SUSPENSION RESPIRATORY (INHALATION) at 07:24

## 2024-01-17 RX ADMIN — Medication 1000 UNITS: at 09:43

## 2024-01-17 RX ADMIN — CEFAZOLIN SODIUM 2000 MG: 2 SOLUTION INTRAVENOUS at 15:54

## 2024-01-17 RX ADMIN — CEFAZOLIN SODIUM 2000 MG: 2 SOLUTION INTRAVENOUS at 07:47

## 2024-01-17 RX ADMIN — PREDNISONE 30 MG: 20 TABLET ORAL at 09:43

## 2024-01-17 RX ADMIN — PANTOPRAZOLE SODIUM 20 MG: 20 TABLET, DELAYED RELEASE ORAL at 07:52

## 2024-01-17 RX ADMIN — ENOXAPARIN SODIUM 40 MG: 40 INJECTION SUBCUTANEOUS at 09:43

## 2024-01-17 RX ADMIN — POTASSIUM CHLORIDE 40 MEQ: 1.5 SOLUTION ORAL at 09:43

## 2024-01-17 RX ADMIN — FORMOTEROL FUMARATE DIHYDRATE 20 MCG: 20 SOLUTION RESPIRATORY (INHALATION) at 07:24

## 2024-01-17 NOTE — ASSESSMENT & PLAN NOTE
Wt Readings from Last 3 Encounters:   01/15/24 76.8 kg (169 lb 3.3 oz)   01/08/24 78 kg (172 lb)   01/04/24 77.6 kg (171 lb)     -Mild decompensation on admission   -Patient was even seen and evaluated by advanced heart failure team with limited options moving forward particularly given low blood pressure and overall frailty  -Continue current medical therapy with home diuretic regimen and counseled patient on sodium and fluid restricted diet  -If patient unable to tolerate low-dose medical therapy may need to discuss potential for midodrine to allow for diuretic regimen to assist with more euvolemic state and avoid exacerbation although limited options due to hypotension.

## 2024-01-17 NOTE — PROGRESS NOTES
Patient:    MRN:  [id number]    Nicholein Request ID:  [id number]    Level of care reserved:  Home Health Agency    Partner Reserved:  [facility], [place], [place] [id number] [phone]    Clinical needs requested:    Geography searched:  [id number]    Start of Service:    Request sent:  10:22am EST on 1/17/2024 by Seun Puente    Partner reserved:  10:25am EST on 1/17/2024 by Seun Puente    Choice list shared:

## 2024-01-17 NOTE — ASSESSMENT & PLAN NOTE
Wt Readings from Last 3 Encounters:   01/15/24 76.8 kg (169 lb 3.3 oz)   01/08/24 78 kg (172 lb)   01/04/24 77.6 kg (171 lb)     BP stable continue home medications  With component of acute kidney injury.  Creatinine has been relatively stable with mild fluctuation.  Lasix has been resumed.  Monitor urine output  Follow-up blood work with PCP in 1 week

## 2024-01-17 NOTE — PLAN OF CARE
Problem: PHYSICAL THERAPY ADULT  Goal: Performs mobility at highest level of function for planned discharge setting.  See evaluation for individualized goals.  Description: Treatment/Interventions: Functional transfer training, LE strengthening/ROM, Elevations, Therapeutic exercise, Endurance training, Patient/family training, Equipment eval/education, Bed mobility, Gait training, Spoke to nursing, Spoke to case management  Equipment Recommended: Walker (patient has own)       See flowsheet documentation for full assessment, interventions and recommendations.  1/17/2024 1057 by Janelle Sauer, PT  Note: Prognosis: Good  Problem List: Decreased strength, Decreased endurance, Impaired balance, Decreased mobility, Decreased coordination, Pain, Impaired hearing  Assessment: Pt is 78 y.o. male seen for PT evaluation s/p admit to  [facility]  on 1/14/2024 w/ Acute and chronic respiratory failure with hypoxia (HCC). PT consulted to assess pt's functional mobility and d/c needs. Order placed for PT eval and tx, w/ up and OOB as tolerated order. Comorbidities affecting pt's physical performance at time of assessment include: weakness, acute on chronic respiratory failure with hypoxia,severe COPD, R lower lobe pneumonia, CAD,chronic systolic CHF. PTA, pt was independent w/ all functional mobility w/ AD usage . Personal factors affecting pt at time of IE include: inability to ambulate household distances, inability to navigate community distances, inability to navigate level surfaces w/o external assistance, unable to perform dynamic tasks in community, hearing impairments, inability to perform IADLs, and inability to perform ADLs. Please find objective findings from PT assessment regarding body systems outlined above with impairments and limitations including weakness, impaired balance, decreased endurance, impaired coordination, gait deviations, decreased activity tolerance, decreased functional mobility  tolerance, fall risk, and SOB upon exertion. The following objective measures performed on IE also reveal limitations: AM-PAC 6-Clicks: 18/24. From PT/mobility standpoint, recommendation at time of d/c would be of minimal resource intensity pending progress in order to facilitate return to PLOF.        Rehab Resource Intensity Level, PT: III (Minimum Resource Intensity)    See flowsheet documentation for full assessment.

## 2024-01-17 NOTE — CASE MANAGEMENT
Case Management Discharge Planning Note    Patient name Seun Alva  Location /-01 MRN [id number]  : 1945 Date 2024       Current Admission Date: 2024  Current Admission Diagnosis:Acute and chronic respiratory failure with hypoxia (HCC)   Patient Active Problem List    Diagnosis Date Noted    Acute on chronic combined systolic (congestive) and diastolic (congestive) heart failure (HCC) 2024    Left eye pain 2023    Intermittent constipation 2023    Chronic systolic congestive heart failure (HCC) 10/22/2023    Erectile dysfunction 2023    Other disorders of refraction 2023    Occlusion and stenosis of unspecified carotid artery 2023    Dysphagia 2023    Congestive heart failure with left ventricular systolic dysfunction (LVSD) (HCC) 2023    Intracranial aneurysm 2023    Allergic rhinitis, unspecified 2023    Chronic kidney disease, stage 3a (HCC) 2023    Generalized muscle weakness 2023    Hypertensive heart and chronic kidney disease with heart failure and stage 1 through stage 4 chronic kidney disease, or unspecified chronic kidney disease (HCC) 2023    Need for assistance with personal care 2023    Other abnormalities of gait and mobility 2023    Sudden idiopathic hearing loss, right ear 2023    Acute and chronic respiratory failure with hypoxia (HCC) 2023    Elevated troponin 2023    Right lower lobe pneumonia 2023    Orthopnea 2023    Lower extremity edema 2023    Irregular heart rhythm 2023    Left leg pain 2023    Transient right leg weakness 2023    COPD, severe (HCC) 2023    Sensorineural hearing loss, bilateral 10/19/2022    Chronic respiratory failure with hypoxia (HCC) 10/15/2022    Prostate cancer (HCC) 2022    Elevated MCV 2022    Cubital tunnel syndrome on left 2022    Carpal tunnel syndrome on left  07/22/2022    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    Incomplete bladder emptying 04/27/2022    Chronic bilateral low back pain with right-sided sciatica 04/18/2022    Mid back pain 04/18/2022    Thoracic radiculopathy 04/18/2022    Chronic pain syndrome 04/18/2022    Hoarseness or changing voice 04/14/2022    Chronic right-sided thoracic back pain 03/05/2022    Primary insomnia 03/02/2022    Right hip pain 01/20/2022    Abnormal nuclear stress test 03/18/2021    Costochondral chest pain 01/15/2021    COPD with acute exacerbation (HCC) 01/11/2021    Oxygen dependent 01/11/2021    S/P carotid endarterectomy 01/11/2021    Stented coronary artery 01/11/2021    Anisometropia 01/11/2021    Osteoarthritis of spinal facet joint 01/11/2021    Chronic ischemic heart disease 01/11/2021    Diverticular disease of colon 01/11/2021    Dry eye syndrome 01/11/2021    GERD (gastroesophageal reflux disease) 01/11/2021    Acute hearing loss, right 01/11/2021    Elevated PSA 01/11/2021    Lens replaced by other means 01/11/2021    Lumbar radiculopathy 01/11/2021    Multinodular goiter 01/11/2021    Nuclear senile cataract 01/11/2021    Occlusion and stenosis of carotid artery 01/11/2021    Osteoarthritis of hip 01/11/2021    Prediabetes 01/11/2021    LAMBERTO on CPAP 01/11/2021    Solitary pulmonary nodule 01/11/2021    Thyroid nodule 01/11/2021    Guyon syndrome, 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    Depression, recurrent (HCC)     Hyperlipidemia     Coronary artery disease involving native coronary artery of native heart without angina pectoris     Left carotid artery occlusion       LOS (days): 3  Geometric Mean LOS (GMLOS) (days): 5  Days to GMLOS:1.6     OBJECTIVE:  Risk of Unplanned Readmission Score: 50.08         Current admission status: Inpatient   Preferred Pharmacy:   [id number] - [place] - [address]  [address]  [place] [address]  Phone: [phone] Fax: [phone]    [facility] -  [place], [place] - [address]  [address]  [facility] [address]  [facility] [address]  Phone: [phone] Fax: [phone]    Primary Care Provider: Sarahy Clifford DO    Primary Insurance: [employer] REP  Secondary Insurance:     DISCHARGE DETAILS:       Freedom of Choice: Yes     CM contacted family/caregiver?: Yes  Were Treatment Team discharge recommendations reviewed with patient/caregiver?: Yes  Did patient/caregiver verbalize understanding of patient care needs?: Yes  Were patient/caregiver advised of the risks associated with not following Treatment Team discharge recommendations?: Yes       Requested Home Health Care         Home Health Discipline requested:: Nursing, Physical Therapy  Home Health Agency Name:: [facility]  Home Health Follow-Up Provider:: CORNELL  Home Health Services Needed:: Evaluate Functional Status and Safety, Strengthening/Theraputic Exercises to Improve Function, Gait/ADL Training  Oxygen LPM Ordered (if applicable based on home health services needed):: 3 LPM  Homebound Criteria Met:: Requires the Assistance of Another Person for Safe Ambulation or to Leave the Home, Uses an Assist Device (i.e. cane, walker, etc)  Supporting Clincal Findings:: Limited Endurance, Fatigues Easliy in Short Distances    DME Referral Provided  Referral made for DME?: No      Treatment Team Recommendation: Home with Home Health Care  Discharge Destination Plan:: Home with Home Health Care  Transport at Discharge : Family           Additional Comments: HHC recommended by therapy. Pt was active with [facility] prior to admit. Referral sent to [facility] via [facility]. Pt will resume same on discharge. Family to transport.

## 2024-01-17 NOTE — ASSESSMENT & PLAN NOTE
Chest x-ray with right basilar opacity, possible aspiration  Sputum culture with Klebsiella pneumoniae sensitive to cefazolin  Cefepime/Zithromax discontinued  Blood cultures negative to date  COVID/flu/RSV negative  Strep pneumo/urine Legionella negative  Swallow eval appreciated  Continue oxygen as needed  Procalcitonin stable  Patient will be discharged on Augmentin to complete course of therapy

## 2024-01-17 NOTE — ASSESSMENT & PLAN NOTE
Patient presented to ED very hypoxic was on BiPAP initially and transitioned down to CPAP secondary to pneumonia and COPD exacerbation  Patient is chronically on 3 L nasal cannula at home  Patient has improved with steroids, diuresis, antibiotics  Pulmonology and cardiology input appreciated  Titrated back down to home O2 requirement  Continue prednisone taper  Resume diuretics

## 2024-01-17 NOTE — PLAN OF CARE
Problem: PAIN - ADULT  Goal: Verbalizes/displays adequate comfort level or baseline comfort level  Description: Interventions:  - Encourage patient to monitor pain and request assistance  - Assess pain using appropriate pain scale  - Administer analgesics based on type and severity of pain and evaluate response  - Implement non-pharmacological measures as appropriate and evaluate response  - Consider cultural and social influences on pain and pain management  - Notify physician/advanced practitioner if interventions unsuccessful or patient reports new pain  Outcome: Progressing     Problem: INFECTION - ADULT  Goal: Absence or prevention of progression during hospitalization  Description: INTERVENTIONS:  - Assess and monitor for signs and symptoms of infection  - Monitor lab/diagnostic results  - Monitor all insertion sites, i.e. indwelling lines, tubes, and drains  - Monitor endotracheal if appropriate and nasal secretions for changes in amount and color  - [place] appropriate cooling/warming therapies per order  - Administer medications as ordered  - Instruct and encourage patient and family to use good hand hygiene technique  - Identify and instruct in appropriate isolation precautions for identified infection/condition  Outcome: Progressing  Goal: Absence of fever/infection during neutropenic period  Description: INTERVENTIONS:  - Monitor WBC    Outcome: Progressing     Problem: SAFETY ADULT  Goal: Patient will remain free of falls  Description: INTERVENTIONS:  - Educate patient/family on patient safety including physical limitations  - Instruct patient to call for assistance with activity   - Consult OT/PT to assist with strengthening/mobility   - Keep Call bell within reach  - Keep bed low and locked with side rails adjusted as appropriate  - Keep care items and personal belongings within reach  - Initiate and maintain comfort rounds  - Make Fall Risk Sign visible to staff  - Offer Toileting every 4 Hours,  in advance of need  - Initiate/Maintain bed alarm  - Obtain necessary fall risk management equipment: .  - Apply yellow socks and bracelet for high fall risk patients  - Consider moving patient to room near nurses station  Outcome: Progressing  Goal: Maintain or return to baseline ADL function  Description: INTERVENTIONS:  -  Assess patient's ability to carry out ADLs; assess patient's baseline for ADL function and identify physical deficits which impact ability to perform ADLs (bathing, care of mouth/teeth, toileting, grooming, dressing, etc.)  - Assess/evaluate cause of self-care deficits   - Assess range of motion  - Assess patient's mobility; develop plan if impaired  - Assess patient's need for assistive devices and provide as appropriate  - Encourage maximum independence but intervene and supervise when necessary  - Involve family in performance of ADLs  - Assess for home care needs following discharge   - Consider OT consult to assist with ADL evaluation and planning for discharge  - Provide patient education as appropriate  Outcome: Progressing  Goal: Maintains/Returns to pre admission functional level  Description: INTERVENTIONS:  - Perform AM-PAC 6 Click Basic Mobility/ Daily Activity assessment daily.  - Set and communicate daily mobility goal to care team and patient/family/caregiver.   - Collaborate with rehabilitation services on mobility goals if consulted  - Perform Range of Motion 3 times a day.  - Reposition patient every 2 hours.  - Dangle patient 3 times a day  - Stand patient 3 times a day  - Ambulate patient 3 times a day  - Out of bed to chair 3 times a day   - Out of bed for meals 3 times a day  - Out of bed for toileting  - Record patient progress and toleration of activity level   Outcome: Progressing     Problem: DISCHARGE PLANNING  Goal: Discharge to home or other facility with appropriate resources  Description: INTERVENTIONS:  - Identify barriers to discharge w/patient and caregiver  -  Arrange for needed discharge resources and transportation as appropriate  - Identify discharge learning needs (meds, wound care, etc.)  - Arrange for interpretive services to assist at discharge as needed  - Refer to Case Management Department for coordinating discharge planning if the patient needs post-hospital services based on physician/advanced practitioner order or complex needs related to functional status, cognitive ability, or social support system  Outcome: Progressing     Problem: Knowledge Deficit  Goal: Patient/family/caregiver demonstrates understanding of disease process, treatment plan, medications, and discharge instructions  Description: Complete learning assessment and assess knowledge base.  Interventions:  - Provide teaching at level of understanding  - Provide teaching via preferred learning methods  Outcome: Progressing     Problem: Prexisting or High Potential for Compromised Skin Integrity  Goal: Skin integrity is maintained or improved  Description: INTERVENTIONS:  - Identify patients at risk for skin breakdown  - Assess and monitor skin integrity  - Assess and monitor nutrition and hydration status  - Monitor labs   - Assess for incontinence   - Turn and reposition patient  - Assist with mobility/ambulation  - Relieve pressure over bony prominences  - Avoid friction and shearing  - Provide appropriate hygiene as needed including keeping skin clean and dry  - Evaluate need for skin moisturizer/barrier cream  - Collaborate with interdisciplinary team   - Patient/family teaching  - Consider wound care consult   Outcome: Progressing     Problem: Nutrition/Hydration-ADULT  Goal: Nutrient/Hydration intake appropriate for improving, restoring or maintaining nutritional needs  Description: Monitor and assess patient's nutrition/hydration status for malnutrition. Collaborate with interdisciplinary team and initiate plan and interventions as ordered.  Monitor patient's weight and dietary intake as  ordered or per policy. Utilize nutrition screening tool and intervene as necessary. Determine patient's food preferences and provide high-protein, high-caloric foods as appropriate.     INTERVENTIONS:  - Monitor oral intake, urinary output, labs, and treatment plans  - Assess nutrition and hydration status and recommend course of action  - Evaluate amount of meals eaten  - Assist patient with eating if necessary   - Allow adequate time for meals  - Recommend/ encourage appropriate diets, oral nutritional supplements, and vitamin/mineral supplements  - Order, calculate, and assess calorie counts as needed  - Recommend, monitor, and adjust tube feedings and TPN/PPN based on assessed needs  - Assess need for intravenous fluids  - Provide specific nutrition/hydration education as appropriate  - Include patient/family/caregiver in decisions related to nutrition  Outcome: Progressing

## 2024-01-17 NOTE — NURSING NOTE
Patient's IV removed with catheter tip intact. Dsd and pressure applied. Pressure dressing placed ontop. AVS reviewed with patient at the bedside. Questions and concerns addressed at this time.

## 2024-01-17 NOTE — PHYSICAL THERAPY NOTE
Physical Therapy Evaluation     Patient's Name: Seun Alva    Admitting Diagnosis  Shortness of breath [R06.02]  Chest pain [R07.9]  COPD exacerbation (HCC) [J44.1]  Right lower lobe pneumonia [J18.9]  Acute and chronic respiratory failure with hypoxia (HCC) [J96.21]  Chest pain, unspecified type [R07.9]    Problem List  Patient Active Problem List   Diagnosis    Hyperlipidemia    Coronary artery disease involving native coronary artery of native heart without angina pectoris    Left carotid artery occlusion    COPD with acute exacerbation (HCC)    Oxygen dependent    Depression, recurrent (HCC)    S/P carotid endarterectomy    Stented coronary artery    Anisometropia    Osteoarthritis of spinal facet joint    Chronic ischemic heart disease    Diverticular disease of colon    Dry eye syndrome    GERD (gastroesophageal reflux disease)    Acute hearing loss, right    Elevated PSA    Lens replaced by other means    Lumbar radiculopathy    Multinodular goiter    Nuclear senile cataract    Occlusion and stenosis of carotid artery    Osteoarthritis of hip    Prediabetes    LAMBERTO on CPAP    Solitary pulmonary nodule    Thyroid nodule    Guyon syndrome, left    Costochondral chest pain    Abnormal nuclear stress test    Right hip pain    Primary insomnia    Chronic right-sided thoracic back pain    Hoarseness or changing voice    Chronic bilateral low back pain with right-sided sciatica    Mid back pain    Thoracic radiculopathy    Chronic pain syndrome    Incomplete bladder emptying    Intercostal neuralgia    Cubital tunnel syndrome on left    Carpal tunnel syndrome on left    Elevated MCV    Prostate cancer (HCC)    Chronic respiratory failure with hypoxia (HCC)    Sensorineural hearing loss, bilateral    COPD, severe (HCC)    Left leg pain    Transient right leg weakness    Orthopnea    Lower extremity edema    Irregular heart rhythm    Right lower lobe pneumonia    Elevated troponin    Intracranial aneurysm     Congestive heart failure with left ventricular systolic dysfunction (LVSD) ([facility])    Dysphagia    Erectile dysfunction    Allergic rhinitis, unspecified    Chronic kidney disease, stage 3a ([facility])    Generalized muscle weakness    Hypertensive heart and chronic kidney disease with heart failure and stage 1 through stage 4 chronic kidney disease, or unspecified chronic kidney disease (HCC)    Need for assistance with personal care    Other abnormalities of gait and mobility    Sudden idiopathic hearing loss, right ear    Acute and chronic respiratory failure with hypoxia ([facility])    Other disorders of refraction    Occlusion and stenosis of unspecified carotid artery    Chronic systolic congestive heart failure ([facility])    Left eye pain    Intermittent constipation    Acute on chronic combined systolic (congestive) and diastolic (congestive) heart failure ([facility])       Past Medical History  Past Medical History:   Diagnosis Date    Abnormal ECG 2021 OR 2022    Alcoholism ([facility]) 1984    sober 38 years    Arthritis 2008    beginning    Bilateral carotid artery stenosis     Callus     Cancer ([facility])     skin    Chronic diastolic heart failure ([facility])     Chronic ischemic heart disease     Chronic kidney disease     stage 3    Colon polyp     COPD (chronic obstructive pulmonary disease) ([facility])     Coronary artery disease     hx stents, MI, PCI    COVID 11/2021    COVID 12/01/2023    CPAP (continuous positive airway pressure) dependence     Disease of thyroid gland 2017    nodules    Emphysema of lung ([facility]) 1995    started    Hearing loss     History of transfusion 1995    Hyperlipidemia     Hypertension     Intracranial aneurysm 04/25/2023    Lung nodule 2023    x rays    MI (myocardial infarction) ([facility]) 1995    Myocardial infarction ([facility]) 1995    Pneumonia     Pneumothorax 02/20/2023    collapsed lung    Prostate cancer ([facility])     RSV (respiratory syncytial virus infection) 10/2022    S/P carotid endarterectomy     Shortness of breath      O2 2 l/nc PRN    Sleep apnea     Sleep apnea, obstructive     Stented coronary artery        Past Surgical History  Past Surgical History:   Procedure Laterality Date    APPENDECTOMY      CARDIAC CATHETERIZATION  03/18/2021    left main with no significant disease, proximal LAD with 10% stenosis at the site of prior stent, left circumflex artery with minimal luminal irregularities mid RCA with 50% stenosis at site of prior stent and PL segment with distal disease supplied by collaterals from the distal circumflex with no significant CAD requiring revascularization at that time.    CATARACT EXTRACTION, BILATERAL Bilateral     COLONOSCOPY      CORONARY ANGIOPLASTY WITH STENT PLACEMENT  1999    RCA    CORONARY ANGIOPLASTY WITH STENT PLACEMENT  2001    RCA    CORONARY ANGIOPLASTY WITH STENT PLACEMENT  2003    LAD    EYE SURGERY      AZ BX PROSTATE STRTCTC SATURATION SAMPLING IMG GID N/A 09/13/2022    Procedure: TRANSPERINEAL MRI FUSION  BIOPSY PROSTATE;  Surgeon: Mele Patel MD;  Location: [facility];  Service: Urology    AZ NEUROPLASTY &/TRANSPOS MEDIAN NRV CARPAL TUNNE Left 07/22/2022    Procedure: RELEASE CARPAL TUNNEL;  Surgeon: Matty Mcgowan MD;  Location: [facility];  Service: Orthopedics    AZ NEUROPLASTY &/TRANSPOSITION ULNAR NERVE ELBOW Left 07/22/2022    Procedure: RELEASE CUBITAL TUNNEL;  Surgeon: Matty Mcgowan MD;  [facility];  Service: Orthopedics    AZ NEUROPLASTY &/TRANSPOSITION ULNAR NERVE WRIST Left 07/22/2022    Procedure: GUYON'S CANAL RELEASE;  Surgeon: Matty Mcgowan MD;  Location: [facility];  Service: Orthopedics    SKIN CANCER EXCISION  2012    chin-per pt, basal cell  also right ankle    TONSILLECTOMY  no        01/17/24 0840   PT Last Visit   PT Visit Date 01/17/24   Note Type   Note type Evaluation   Pain Assessment   Pain Assessment Tool 0-10   Pain Score 2   Pain Location/Orientation Orientation: Bilateral;Location: Abdomen   Pain Onset/Description Onset:  Ongoing;Frequency: Constant/Continuous;Descriptor: Aching;Descriptor: Sore   Effect of Pain on Daily Activities yes   Patient's Stated Pain Goal No pain   Hospital Pain Intervention(s) Repositioned;Ambulation/increased activity;Elevated;Emotional support;Environmental changes   Multiple Pain Sites No   Restrictions/Precautions   Weight Bearing Precautions Per Order No   Other Precautions Chair Alarm;Bed Alarm;Fall Risk;Pain;O2;Aspiration;Fluid restriction;Hard of hearing  (3 L NC, severely [place])   Home Living   Type of Home House   Home Layout Two level;Performs ADLs on one level;Able to live on main level with bedroom/bathroom;Stairs to enter with rails  (2 SATNAM)   Bathroom Shower/Tub Walk-in shower   Bathroom Toilet Standard   Bathroom Equipment Shower chair;Commode   Bathroom Accessibility Accessible   Home Equipment Walker;Cane  (baseline RW usage)   Prior Function   Level of [place] Independent with ADLs;Independent with functional mobility;Needs assistance with IADLS   Lives With Spouse;Daughter;Family   Receives Help From Family   IADLs Independent with meal prep;Independent with medication management;Family/Friend/Other provides transportation   Falls in the last 6 months 0  (denies)   Vocational Retired   General   Additional Pertinent History Myrisa OT present for co-assessment due to medical complexity, required skilled interventions of 2 clinicians for care delivery.   Family/Caregiver Present No   Cognition   Overall Cognitive Status WFL   Arousal/Participation Alert   Attention Attends with cues to redirect   Orientation Level Oriented X4   Memory Within functional limits   Following Commands Follows one step commands without difficulty   Comments Shlomo was agreeable to PT assessment, pleasant.   RLE Assessment   RLE Assessment X  (3+/5 gross musculature)   LLE Assessment   LLE Assessment X  (3+/5 gross musculature)   Vision-Basic Assessment   Current Vision Wears glasses all the time   Vestibular    Spontaneous Nystagmus (-) no evidence of nystagmus at rest in room light   Gaze Holding Nystagmus (-) no evidence of nystagmus   Coordination   Movements are Fluid and Coordinated 0   Coordination and Movement Description Incremental, antalgic mobility requiring increased time.   Bed Mobility   Supine to Sit 4  Minimal assistance   Additional items Assist x 1;HOB elevated;Bedrails;Increased time required;Verbal cues;LE management   Sit to Supine   (DNT as Shlomo was sitting out of bed on the recliner upon conclusion.)   Additional Comments Verbal cues for bedrail utilization and proper body mechanics.   Transfers   Sit to Stand   (CGA)   Additional items Assist x 1;Bedrails;Increased time required;Verbal cues  (RW utilization)   Stand to Sit   (CGA)   Additional items Assist x 1;Bedrails;Increased time required;Verbal cues   Stand pivot   (CGA)   Additional items Assist x 1;Increased time required;Verbal cues   Additional Comments Edge of bed standing x 1 minute for urinal usage.Verbal cues for proper BUE placement with transitional movements, environmental awareness with directional changes.   Ambulation/Elevation   Gait pattern Improper Weight shift;Narrow CORDELL;Forward Flexion;Decreased foot clearance;Short stride   Gait Assistance   (CGA)   Additional items Assist x 1;Verbal cues;Tactile cues   Assistive Device Rolling walker   Distance 10 feet x 2   Stair Management Assistance Not tested   Ambulation/Elevation Additional Comments Verbal cues for base of support widening and proper AD usage.   Balance   Static Sitting Good   Dynamic Sitting Fair +   Static Standing Fair   Dynamic Standing Fair -   Ambulatory Fair -   Endurance Deficit   Endurance Deficit Yes   Endurance Deficit Description CHACON exhibited  with gait activities. Symptomatic resolution with increased time and conservatory breathing strategy utilization.   Activity Tolerance   Activity Tolerance Patient limited by fatigue;Treatment limited secondary to  medical complications (Comment)  (CHACON)   Medical Staff Made Aware Yes, CM was informed of d/c disposition recommendation.   Nurse Made Aware yes, [place] PCA   Assessment   Prognosis Good   Problem List Decreased strength;Decreased endurance;Impaired balance;Decreased mobility;Decreased coordination;Pain;Impaired hearing   Assessment Pt is 78 y.o. male seen for PT evaluation s/p admit to  [facility]  on 1/14/2024 w/ Acute and chronic respiratory failure with hypoxia (HCC). PT consulted to assess pt's functional mobility and d/c needs. Order placed for PT eval and tx, w/ up and OOB as tolerated order. Comorbidities affecting pt's physical performance at time of assessment include: weakness, acute on chronic respiratory failure with hypoxia,severe COPD, R lower lobe pneumonia, CAD,chronic systolic CHF. PTA, pt was independent w/ all functional mobility w/ AD usage . Personal factors affecting pt at time of IE include: inability to ambulate household distances, inability to navigate community distances, inability to navigate level surfaces w/o external assistance, unable to perform dynamic tasks in community, hearing impairments, inability to perform IADLs, and inability to perform ADLs. Please find objective findings from PT assessment regarding body systems outlined above with impairments and limitations including weakness, impaired balance, decreased endurance, impaired coordination, gait deviations, decreased activity tolerance, decreased functional mobility tolerance, fall risk, and SOB upon exertion. The following objective measures performed on IE also reveal limitations: AM-PAC 6-Clicks: 18/24. From PT/mobility standpoint, recommendation at time of d/c would be of minimal resource intensity pending progress in order to facilitate return to PLOF.   Goals   Patient Goals to get home soon   LTG Expiration Date 01/27/24   Long Term Goal #1 1.)Patient will complete bed mobility supervision of 1 for  decrease need for caregiver assistance, decrease burden of care. 2.) Patient will complete transfers supervision of 1 to decrease risk of falls, facilitate upright standing posture. 3.) BLE strength to greater than/equal to 4/5 gross musculature to increase ability to safely transfer, control descent to chair. 4.) Patient will exhibit increase dynamic standing to Fair+ 3-4 minutes without LOB supervision of 1 to improve activity tolerance. 5.) Patient will exhibit increase dynamic ambulatory balance to Fair > 50 feet w/AD supervision of 1 to improve ability to mobilize to toilet, chair and decrease risk for additional medical complications. 6.) Patient will exhibit good self monitoring and ability to follow 2 step commands to increase complexity of tasks and resume ADL's without LOB.   PT Treatment Day 0   Plan   Treatment/Interventions Functional transfer training;LE strengthening/ROM;Elevations;Therapeutic exercise;Endurance training;Patient/family training;Equipment eval/education;Bed mobility;Gait training;Spoke to nursing;Spoke to case management   PT Frequency 2-3x/wk   Discharge Recommendation   Rehab Resource Intensity Level, PT III (Minimum Resource Intensity)   Equipment Recommended Walker  (patient has own)   Walker Package Recommended Wheeled walker   Change/add to Walker Package? No   Additional Comments Upon conclusion, Shlomo was sitting out of bed on the recliner. The chair alarm was engaged and all needs within reach.   AM-PAC Basic Mobility Inpatient   Turning in Flat Bed Without Bedrails 3   Lying on Back to Sitting on Edge of Flat Bed Without Bedrails 3   Moving Bed to Chair 3   Standing Up From Chair Using Arms 3   Walk in Room 3   Climb 3-5 Stairs With Railing 3   Basic Mobility Inpatient Raw Score 18   Basic Mobility Standardized Score 41.05   Highest Level Of Mobility   JH-HLM Goal 6: Walk 10 steps or more   JH-HLM Achieved 6: Walk 10 steps or more     History/Personal Factors/Comorbidities:  weakness, acute on chronic respiratory failure with hypoxia,severe COPD, R lower lobe pneumonia, CAD,chronic systolic CHF    # of body structures/limitations: muscle weakness, activity intolerance,decreased endurance, impaired balance, gait deviations,pain,impaired coordination    Clinical presentation: unstable as seen in constant pain, hearing impairment severity, fall risk, CHACON, progressive symptoms prior to hospitalization    Initial Assessment Time: [id number]    Janelle Sauer, PT

## 2024-01-17 NOTE — DISCHARGE SUMMARY
[facility]  Discharge- Seun Alva 1945, 78 y.o. male MRN: [id number]  Unit/Bed#: -[facility] Encounter: [id number]  Primary Care Provider: Sarahy Clifford DO   Date and time admitted to hospital: 1/14/2024  2:13 AM    Right lower lobe pneumonia  Assessment & Plan  Chest x-ray with right basilar opacity, possible aspiration  Sputum culture with Klebsiella pneumoniae sensitive to cefazolin  Cefepime/Zithromax discontinued  Blood cultures negative to date  COVID/flu/RSV negative  Strep pneumo/urine Legionella negative  Swallow eval appreciated  Continue oxygen as needed  Procalcitonin stable  Patient will be discharged on Augmentin to complete course of therapy    Chronic systolic congestive heart failure (HCC)  Assessment & Plan  Wt Readings from Last 3 Encounters:   01/15/24 76.8 kg (169 lb 3.3 oz)   01/08/24 78 kg (172 lb)   01/04/24 77.6 kg (171 lb)     With component of fluid overload  Continue home medications  Was initially on IV Lasix, held due to elevated creatinine.  Resume Lasix   Monitor EPNG's and daily weight    Hypertensive heart and chronic kidney disease with heart failure and stage 1 through stage 4 chronic kidney disease, or unspecified chronic kidney disease (HCC)  Assessment & Plan  Wt Readings from Last 3 Encounters:   01/15/24 76.8 kg (169 lb 3.3 oz)   01/08/24 78 kg (172 lb)   01/04/24 77.6 kg (171 lb)     BP stable continue home medications  With component of acute kidney injury.  Creatinine has been relatively stable with mild fluctuation.  Lasix has been resumed.  Monitor urine output  Follow-up blood work with PCP in 1 week    Dysphagia  Assessment & Plan  Mechanical soft diet with nectar thick liquids.  Swallow eval appreciated    Elevated troponin  Assessment & Plan  Suspect secondary to hypoxia  Cardiology input appreciated    COPD, severe (HCC)  Assessment & Plan  Patient with shortness of breath and wheezing was requiring BiPAP in the ED  Pulmonology  input appreciated  Continue nebulizers, Pulmicort  Continue steroid taper  Currently back to home O2 requirement of 3 L nasal cannula    LAMBERTO on CPAP  Assessment & Plan  CPAP At bedtime    Coronary artery disease involving native coronary artery of native heart without angina pectoris  Assessment & Plan  Continue home medications    * Acute and chronic respiratory failure with hypoxia (HCC)  Assessment & Plan  Patient presented to ED very hypoxic was on BiPAP initially and transitioned down to CPAP secondary to pneumonia and COPD exacerbation  Patient is chronically on 3 L nasal cannula at home  Patient has improved with steroids, diuresis, antibiotics  Pulmonology and cardiology input appreciated  Titrated back down to home O2 requirement  Continue prednisone taper  Resume diuretics      Medical Problems       Resolved Problems  Date Reviewed: 1/14/2024   None       Discharging Physician / Practitioner: Hyacinth Espana MD  PCP: Sarahy Clifford DO  Admission Date:   Admission Orders (From admission, onward)       Ordered        01/14/24 0323  INPATIENT ADMISSION  Once                          Discharge Date: 01/17/24    Consultations During Hospital Stay:  Cardiology, pulmonology    Procedures Performed:   None    Significant Findings / Test Results:   Pneumonia, COPD exacerbation    Incidental Findings:   None    Test Results Pending at Discharge (will require follow up):   None     Outpatient Tests Requested:  Routine labs with PCP as outpatient    Complications: None    Reason for Admission: Pneumonia    Hospital Course:   Seun Alva is a 78 y.o. male patient who originally presented to the hospital on 1/14/2024 due to shortness of breath.  Patient admitted with suspected pneumonia and COPD exacerbation.  Patient was started on antibiotics.  Infectious workup was initiated.  Also with suspected component of fluid overload and started on IV Lasix.  Pulmonology and cardiology were both consulted.   "Patient was initially on BiPAP and titrated off within 24 hours of admission.  Patient gradually improved and was returned back to baseline O2 requirement of 3 L nasal cannula continuously.  Sputum culture was positive for Klebsiella pneumonia.  Antibiotics were adjusted accordingly.  Diuretics were transitioned to oral.  Patient did have a bump in creatinine and diuretics were held for 1 day.  Patient was seen by speech therapy who recommended modified diet.  Patient overall has improved from admission.  Patient does have chronic comorbidities as well as overall debility.  PT/OT was consulted and recommended home with home care.  Case management has set up patient with continued home care.  Patient discharged on Augmentin, steroid taper, and home Lasix.  Advised to have repeat blood work in 1 week to monitor kidney function.    Please see above list of diagnoses and related plan for additional information.     Condition at Discharge: stable    Discharge Day Visit / Exam:   Subjective: No complaints at this time  Vitals: Blood Pressure: 101/60 (01/17/24 0943)  Pulse: 96 (01/17/24 0722)  Temperature: (!) 97.4 °F (36.3 °C) (01/16/24 1511)  Temp Source: Temporal (01/14/24 1127)  Respirations: 18 (01/17/24 0722)  Height: 5' 8\" (172.7 cm) (01/14/24 0708)  Weight - Scale: 76.8 kg (169 lb 3.3 oz) (01/15/24 0500)  SpO2: 96 % (01/17/24 1337)  Exam:   Physical Exam  Constitutional:       General: He is not in acute distress.     Comments: Chronically ill-appearing frail elderly male   HENT:      Head: Normocephalic and atraumatic.      Nose: Nose normal.      Mouth/Throat:      Mouth: Mucous membranes are moist.   Eyes:      Extraocular Movements: Extraocular movements intact.      Conjunctiva/sclera: Conjunctivae normal.   Cardiovascular:      Rate and Rhythm: Normal rate and regular rhythm.   Pulmonary:      Effort: Pulmonary effort is normal. No respiratory distress.      Breath sounds: Rhonchi present.   Abdominal:      " Palpations: Abdomen is soft.      Tenderness: There is no abdominal tenderness.   Musculoskeletal:         General: Normal range of motion.      Cervical back: Normal range of motion and neck supple.      Comments: Generalized weakness   Skin:     General: Skin is warm and dry.   Neurological:      General: No focal deficit present.      Mental Status: He is alert. Mental status is at baseline.      Cranial Nerves: No cranial nerve deficit.   Psychiatric:         Mood and Affect: Mood normal.         Behavior: Behavior normal.          Discussion with Family: Updated  (daughter) via phone.    Discharge instructions/Information to patient and family:   See after visit summary for information provided to patient and family.      Provisions for Follow-Up Care:  See after visit summary for information related to follow-up care and any pertinent home health orders.      Mobility at time of Discharge:   Basic Mobility Inpatient Raw Score: 16  JH-HLM Goal: 5: Stand one or more mins  JH-HLM Achieved: 6: Walk 10 steps or more  HLM Goal achieved. Continue to encourage appropriate mobility.     Disposition:   Home with VNA Services (Reminder: Complete face to face encounter)    Planned Readmission: no     Discharge Statement:  I spent 35 minutes discharging the patient. This time was spent on the day of discharge. I had direct contact with the patient on the day of discharge. Greater than 50% of the total time was spent examining patient, answering all patient questions, arranging and discussing plan of care with patient as well as directly providing post-discharge instructions.  Additional time then spent on discharge activities.    Discharge Medications:  See after visit summary for reconciled discharge medications provided to patient and/or family.      **Please Note: This note may have been constructed using a voice recognition system**

## 2024-01-17 NOTE — OCCUPATIONAL THERAPY NOTE
Occupational Therapy Evaluation      Seun Alva    1/17/2024    Principal Problem:    Acute and chronic respiratory failure with hypoxia (HCC)  Active Problems:    Coronary artery disease involving native coronary artery of native heart without angina pectoris    LAMBERTO on CPAP    COPD, severe (HCC)    Right lower lobe pneumonia    Elevated troponin    Dysphagia    Hypertensive heart and chronic kidney disease with heart failure and stage 1 through stage 4 chronic kidney disease, or unspecified chronic kidney disease (HCC)    Chronic systolic congestive heart failure (HCC)      Past Medical History:   Diagnosis Date    Abnormal ECG 2021 OR 2022    Alcoholism (HCC) 1984    sober 38 years    Arthritis 2008    beginning    Bilateral carotid artery stenosis     Callus     Cancer (HCC)     skin    Chronic diastolic heart failure (HCC)     Chronic ischemic heart disease     Chronic kidney disease     stage 3    Colon polyp     COPD (chronic obstructive pulmonary disease) ([facility])     Coronary artery disease     hx stents, MI, PCI    COVID 11/2021    COVID 12/01/2023    CPAP (continuous positive airway pressure) dependence     Disease of thyroid gland 2017    nodules    Emphysema of lung ([facility]) 1995    started    Hearing loss     History of transfusion 1995    Hyperlipidemia     Hypertension     Intracranial aneurysm 04/25/2023    Lung nodule 2023    x rays    MI (myocardial infarction) ([facility]) 1995    Myocardial infarction ([facility]) 1995    Pneumonia     Pneumothorax 02/20/2023    collapsed lung    Prostate cancer ([facility])     RSV (respiratory syncytial virus infection) 10/2022    S/P carotid endarterectomy     Shortness of breath     O2 2 l/nc PRN    Sleep apnea     Sleep apnea, obstructive     Stented coronary artery        Past Surgical History:   Procedure Laterality Date    APPENDECTOMY      CARDIAC CATHETERIZATION  03/18/2021    left main with no significant disease, proximal LAD with 10% stenosis at the site of prior stent,  left circumflex artery with minimal luminal irregularities mid RCA with 50% stenosis at site of prior stent and PL segment with distal disease supplied by collaterals from the distal circumflex with no significant CAD requiring revascularization at that time.    CATARACT EXTRACTION, BILATERAL Bilateral     COLONOSCOPY      CORONARY ANGIOPLASTY WITH STENT PLACEMENT  1999    RCA    CORONARY ANGIOPLASTY WITH STENT PLACEMENT  2001    RCA    CORONARY ANGIOPLASTY WITH STENT PLACEMENT  2003    LAD    EYE SURGERY      NY BX PROSTATE STRTCTC SATURATION SAMPLING IMG GID N/A 09/13/2022    Procedure: TRANSPERINEAL MRI FUSION  BIOPSY PROSTATE;  Surgeon: Mele Patel MD;  Location: [facility];  Service: Urology    NY NEUROPLASTY &/TRANSPOS MEDIAN NRV CARPAL TUNNE Left 07/22/2022    Procedure: RELEASE CARPAL TUNNEL;  Surgeon: Matty Mcgowan MD;  Location: [facility];  Service: Orthopedics    NY NEUROPLASTY &/TRANSPOSITION ULNAR NERVE ELBOW Left 07/22/2022    Procedure: RELEASE CUBITAL TUNNEL;  Surgeon: Matty Mcgowan MD;  Location: [facility];  Service: Orthopedics    NY NEUROPLASTY &/TRANSPOSITION ULNAR NERVE WRIST Left 07/22/2022    Procedure: GUYON'S CANAL RELEASE;  Surgeon: Matty Mcgowan MD;  Location: [facility];  Service: Orthopedics    SKIN CANCER EXCISION  2012    chin-per pt, basal cell  also right ankle    TONSILLECTOMY  no        01/17/24 0830   OT Last Visit   OT Visit Date 01/17/24   Note Type   Note type Evaluation   Pain Assessment   Pain Assessment Tool 0-10   Pain Score 2   Pain Location/Orientation Location: Abdomen   Pain Onset/Description Onset: Ongoing;Frequency: Constant/Continuous;Descriptor: Aching;Descriptor: Sore   Effect of Pain on Daily Activities yes   Patient's Stated Pain Goal No pain   Hospital Pain Intervention(s) Repositioned;Ambulation/increased activity;Elevated;Emotional support;Environmental changes   Multiple Pain Sites No   Restrictions/Precautions   Weight Bearing Precautions  Per Order No   Other Precautions Chair Alarm;Bed Alarm;Multiple lines;O2;Fall Risk;Pain;Fluid restriction;Aspiration;Hard of hearing  (3L of O2 chronically)   Home Living   Type of Home House   Home Layout Two level;Performs ADLs on one level;Able to live on main level with bedroom/bathroom;Stairs to enter with rails  (2 SATNAM)   Bathroom Shower/Tub Walk-in shower   Bathroom Toilet Standard   Bathroom Equipment Commode;Shower chair   Bathroom Accessibility Accessible   Home Equipment Walker;Quad cane  (Pt reports utilizing RW at baseline)   Prior Function   Level of [place] Independent with ADLs;Independent with functional mobility;Needs assistance with IADLS   Lives With Spouse;Daughter;Family   Receives Help From Family   IADLs Independent with meal prep;Independent with medication management;Family/Friend/Other provides transportation   Falls in the last 6 months 0  (pt denies but + fall history)   Vocational Retired   ADL   UB Bathing Assistance 5  Supervision/Setup   LB Bathing Assistance 4  Minimal Assistance   UB Dressing Assistance 4  Minimal Assistance   LB Dressing Assistance 3  Moderate Assistance   Bed Mobility   Supine to Sit 4  Minimal assistance   Additional items Assist x 1;HOB elevated;Bedrails;Increased time required;Verbal cues   Additional Comments Pt remained OOB in recliner upon conclusion   Transfers   Sit to Stand   (CGA)   Additional items Assist x 1;Increased time required;Verbal cues   Stand to Sit   (CGA)   Additional items Assist x 1;Armrests;Increased time required;Verbal cues   Stand pivot   (CGA)   Additional items Assist x 1;Increased time required;Verbal cues  (RW)   Balance   Static Sitting Good   Dynamic Sitting Fair +   Static Standing Fair   Dynamic Standing Fair -   Ambulatory Fair -   Activity Tolerance   Activity Tolerance Patient limited by fatigue  (CHACON)   Medical Staff Made Aware CM notified   RUE Assessment   RUE Assessment WFL   LUE Assessment   LUE Assessment WFL    Vision-Basic Assessment   Current Vision Wears glasses all the time   Cognition   Overall Cognitive Status WFL   Arousal/Participation Alert;Cooperative   Attention Attends with cues to redirect   Orientation Level Oriented X4   Memory Within functional limits   Following Commands Follows all commands and directions without difficulty   Assessment   Limitation Decreased ADL status;Decreased Safe judgement during ADL;Decreased endurance;Decreased self-care trans;Decreased high-level ADLs   Prognosis Good   Assessment Pt is a 78 y.o. male seen for OT evaluation s/p admit to [facility] on 1/14/2024 w/ Acute and chronic respiratory failure with hypoxia (HCC).  Comorbidities affecting pt's functional performance at time of assessment include:  CAD, Dysphagia, CKD, CHF, COPD . Personal factors affecting pt at time of IE include:steps to enter environment and difficulty performing ADLS. Prior to admission, pt was Mod I with ADLs. Upon evaluation: the following deficits impact occupational performance: decreased balance, decreased tolerance, and increased pain. Pt to benefit from continued skilled OT tx while in the hospital to address deficits as defined above and maximize level of functional independence w ADL's and functional mobility. Occupational Performance areas to address include: bathing/shower, toilet hygiene, dressing, functional mobility, and clothing management. From OT standpoint, recommendation at time of d/c would be Level III (Minimum Resource Intensity).   Goals   Patient Goals To feel better   Plan   Treatment Interventions ADL retraining;Functional transfer training;Endurance training;Patient/family training;Equipment evaluation/education;Compensatory technique education;Energy conservation;Activityengagement   Goal Expiration Date 01/27/24   OT Treatment Day 0   OT Frequency 1-2x/wk   Discharge Recommendation   Rehab Resource Intensity Level, OT III (Minimum Resource Intensity)   Additional Comments   Pt seen as a co-eval with PT due to the patient's co-morbidities, clinically unstable presentation, and present impairments which are a regression from the patient's baseline.   Additional Comments 2 The patient's raw score on the AM-PAC Daily Activity Inpatient Short Form is 19. A raw score of greater than or equal to 19 suggests the patient may benefit from discharge to home. Please refer to the recommendation of the Occupational Therapist for safe discharge planning.   AM-PAC Daily Activity Inpatient   Lower Body Dressing 2   Bathing 3   Toileting 3   Upper Body Dressing 3   Grooming 4   Eating 4   Daily Activity Raw Score 19   Daily Activity Standardized Score (Calc for Raw Score >=11) 40.22   AM-PAC Applied Cognition Inpatient   Following a Speech/Presentation 4   Understanding Ordinary Conversation 4   Taking Medications 4   Remembering Where Things Are Placed or Put Away 4   Remembering List of 4-5 Errands 3   Taking Care of Complicated Tasks 3   Applied Cognition Raw Score 22   Applied Cognition Standardized Score 47.83     GOALS:    Pt will achieve the following within specified time frame: STG  Pt will achieve the following goals within 5 days    *ADL transfers with (S) for inc'd independence with ADLs/purposeful tasks    *UB ADL with (I) for inc'd independence with self cares    *LB ADL with Min (A) using AE prn for inc'd independence with self cares    *Toileting with (S) for clothing management and hygiene for return to PLOF with personal care    *Increase static stand balance to F+ and dyn stand balance to F for inc'd safety with standing purposeful tasks    *Increase stand tolerance x3 m for inc'd tolerance with standing purposeful tasks    *Participate in 10m UE therex to increase overall stamina/activity tolerance for purposeful tasks    *Bed mobility- (S) for inc'd independence to manage own comfort and initiate EOB & OOB purposeful tasks    Pt will achieve the following within specified time  frame: LTG  Pt will achieve the following goals within 10 days    *ADL transfers with (I) for inc'd independence with ADLs/purposeful tasks    *LB ADL with CGA using AE prn for inc'd independence with self cares    *Toileting with (I) for clothing management and hygiene for return to PLOF with personal care    *Increase static stand balance to G- and dyn stand balance to F+ for inc'd safety with standing purposeful tasks    *Increase stand tolerance x5 m for inc'd tolerance with standing purposeful tasks    *Bed mobility- (I) for inc'd independence to manage own comfort and initiate EOB & OOB purposeful tasks      Olive Robb, MS, OTR/L

## 2024-01-17 NOTE — ASSESSMENT & PLAN NOTE
Wt Readings from Last 3 Encounters:   01/15/24 76.8 kg (169 lb 3.3 oz)   01/08/24 78 kg (172 lb)   01/04/24 77.6 kg (171 lb)     With component of fluid overload  Continue home medications  Was initially on IV Lasix, held due to elevated creatinine.  Resume Lasix   Monitor PENG's and daily weight

## 2024-01-17 NOTE — ASSESSMENT & PLAN NOTE
-Continue antibiotics and plan of care per primary team and pulmonology  -Patient currently on nasal cannula oxygen.

## 2024-01-17 NOTE — PLAN OF CARE
Problem: OCCUPATIONAL THERAPY ADULT  Goal: Performs self-care activities at highest level of function for planned discharge setting.  See evaluation for individualized goals.  Description: Treatment Interventions: ADL retraining, Functional transfer training, Endurance training, Patient/family training, Equipment evaluation/education, Compensatory technique education, Energy conservation, Activityengagement    See flowsheet documentation for full assessment, interventions and recommendations.   1/17/2024 1012 by Olive Robb OT  Note: Limitation: Decreased ADL status, Decreased Safe judgement during ADL, Decreased endurance, Decreased self-care trans, Decreased high-level ADLs  Prognosis: Good  Assessment: Pt is a 78 y.o. male seen for OT evaluation s/p admit to [facility] on 1/14/2024 w/ Acute and chronic respiratory failure with hypoxia (HCC).  Comorbidities affecting pt's functional performance at time of assessment include:  CAD, Dysphagia, CKD, CHF, COPD . Personal factors affecting pt at time of IE include:steps to enter environment and difficulty performing ADLS. Prior to admission, pt was Mod I with ADLs. Upon evaluation: the following deficits impact occupational performance: decreased balance, decreased tolerance, and increased pain. Pt to benefit from continued skilled OT tx while in the hospital to address deficits as defined above and maximize level of functional independence w ADL's and functional mobility. Occupational Performance areas to address include: bathing/shower, toilet hygiene, dressing, functional mobility, and clothing management. From OT standpoint, recommendation at time of d/c would be Level III (Minimum Resource Intensity).     Rehab Resource Intensity Level, OT: III (Minimum Resource Intensity)     Oliev Robb MS, OTR/L

## 2024-01-17 NOTE — PLAN OF CARE
Problem: PAIN - ADULT  Goal: Verbalizes/displays adequate comfort level or baseline comfort level  Description: Interventions:  - Encourage patient to monitor pain and request assistance  - Assess pain using appropriate pain scale  - Administer analgesics based on type and severity of pain and evaluate response  - Implement non-pharmacological measures as appropriate and evaluate response  - Consider cultural and social influences on pain and pain management  - Notify physician/advanced practitioner if interventions unsuccessful or patient reports new pain  1/17/2024 1728 by Ramya Weller RN  Outcome: Adequate for Discharge  1/17/2024 0931 by Ramya Weller RN  Outcome: Progressing     Problem: INFECTION - ADULT  Goal: Absence or prevention of progression during hospitalization  Description: INTERVENTIONS:  - Assess and monitor for signs and symptoms of infection  - Monitor lab/diagnostic results  - Monitor all insertion sites, i.e. indwelling lines, tubes, and drains  - Monitor endotracheal if appropriate and nasal secretions for changes in amount and color  - [place] appropriate cooling/warming therapies per order  - Administer medications as ordered  - Instruct and encourage patient and family to use good hand hygiene technique  - Identify and instruct in appropriate isolation precautions for identified infection/condition  Outcome: Adequate for Discharge  Goal: Absence of fever/infection during neutropenic period  Description: INTERVENTIONS:  - Monitor WBC    Outcome: Adequate for Discharge     Problem: SAFETY ADULT  Goal: Patient will remain free of falls  Description: INTERVENTIONS:  - Educate patient/family on patient safety including physical limitations  - Instruct patient to call for assistance with activity   - Consult OT/PT to assist with strengthening/mobility   - Keep Call bell within reach  - Keep bed low and locked with side rails adjusted as appropriate  - Keep care items and personal belongings  within reach  - Initiate and maintain comfort rounds  - Make Fall Risk Sign visible to staff  - Offer Toileting every 2 Hours, in advance of need  - Initiate/Maintain bed/chair alarm  - Obtain necessary fall risk management equipment:   - Apply yellow socks and bracelet for high fall risk patients  - Consider moving patient to room near nurses station  Outcome: Adequate for Discharge  Goal: Maintain or return to baseline ADL function  Description: INTERVENTIONS:  -  Assess patient's ability to carry out ADLs; assess patient's baseline for ADL function and identify physical deficits which impact ability to perform ADLs (bathing, care of mouth/teeth, toileting, grooming, dressing, etc.)  - Assess/evaluate cause of self-care deficits   - Assess range of motion  - Assess patient's mobility; develop plan if impaired  - Assess patient's need for assistive devices and provide as appropriate  - Encourage maximum independence but intervene and supervise when necessary  - Involve family in performance of ADLs  - Assess for home care needs following discharge   - Consider OT consult to assist with ADL evaluation and planning for discharge  - Provide patient education as appropriate  Outcome: Adequate for Discharge  Goal: Maintains/Returns to pre admission functional level  Description: INTERVENTIONS:  - Perform AM-PAC 6 Click Basic Mobility/ Daily Activity assessment daily.  - Set and communicate daily mobility goal to care team and patient/family/caregiver.   - Collaborate with rehabilitation services on mobility goals if consulted  - Perform Range of Motion 3 times a day.  - Reposition patient every 2 hours.  - Dangle patient 3 times a day  - Stand patient 3 times a day  - Ambulate patient 3 times a day  - Out of bed to chair 3 times a day   - Out of bed for meals 3 times a day  - Out of bed for toileting  - Record patient progress and toleration of activity level   Outcome: Adequate for Discharge     Problem: DISCHARGE  PLANNING  Goal: Discharge to home or other facility with appropriate resources  Description: INTERVENTIONS:  - Identify barriers to discharge w/patient and caregiver  - Arrange for needed discharge resources and transportation as appropriate  - Identify discharge learning needs (meds, wound care, etc.)  - Arrange for interpretive services to assist at discharge as needed  - Refer to Case Management Department for coordinating discharge planning if the patient needs post-hospital services based on physician/advanced practitioner order or complex needs related to functional status, cognitive ability, or social support system  Outcome: Adequate for Discharge     Problem: Knowledge Deficit  Goal: Patient/family/caregiver demonstrates understanding of disease process, treatment plan, medications, and discharge instructions  Description: Complete learning assessment and assess knowledge base.  Interventions:  - Provide teaching at level of understanding  - Provide teaching via preferred learning methods  Outcome: Adequate for Discharge     Problem: Prexisting or High Potential for Compromised Skin Integrity  Goal: Skin integrity is maintained or improved  Description: INTERVENTIONS:  - Identify patients at risk for skin breakdown  - Assess and monitor skin integrity  - Assess and monitor nutrition and hydration status  - Monitor labs   - Assess for incontinence   - Turn and reposition patient  - Assist with mobility/ambulation  - Relieve pressure over bony prominences  - Avoid friction and shearing  - Provide appropriate hygiene as needed including keeping skin clean and dry  - Evaluate need for skin moisturizer/barrier cream  - Collaborate with interdisciplinary team   - Patient/family teaching  - Consider wound care consult   Outcome: Adequate for Discharge     Problem: Nutrition/Hydration-ADULT  Goal: Nutrient/Hydration intake appropriate for improving, restoring or maintaining nutritional needs  Description: Monitor  and assess patient's nutrition/hydration status for malnutrition. Collaborate with interdisciplinary team and initiate plan and interventions as ordered.  Monitor patient's weight and dietary intake as ordered or per policy. Utilize nutrition screening tool and intervene as necessary. Determine patient's food preferences and provide high-protein, high-caloric foods as appropriate.     INTERVENTIONS:  - Monitor oral intake, urinary output, labs, and treatment plans  - Assess nutrition and hydration status and recommend course of action  - Evaluate amount of meals eaten  - Assist patient with eating if necessary   - Allow adequate time for meals  - Recommend/ encourage appropriate diets, oral nutritional supplements, and vitamin/mineral supplements  - Order, calculate, and assess calorie counts as needed  - Recommend, monitor, and adjust tube feedings and TPN/PPN based on assessed needs  - Assess need for intravenous fluids  - Provide specific nutrition/hydration education as appropriate  - Include patient/family/caregiver in decisions related to nutrition  Outcome: Adequate for Discharge

## 2024-01-17 NOTE — ASSESSMENT & PLAN NOTE
-Likely multifactorial in the setting of known significant COPD on nasal cannula oxygen at baseline along with heart failure with reduced ejection fraction and possible pneumonia  -Continue current medical therapy as tolerated

## 2024-01-17 NOTE — PROGRESS NOTES
[facility]  Progress Note  Name: Seun Alva I  MRN: [id number]  Unit/Bed#: -01 I Date of Admission: 1/14/2024   Date of Service: 1/17/2024 I Hospital Day: 3    Assessment/Plan   Chronic systolic congestive heart failure (HCC)  Assessment & Plan  Wt Readings from Last 3 Encounters:   01/15/24 76.8 kg (169 lb 3.3 oz)   01/08/24 78 kg (172 lb)   01/04/24 77.6 kg (171 lb)     -Mild decompensation on admission   -Patient was even seen and evaluated by advanced heart failure team with limited options moving forward particularly given low blood pressure and overall frailty  -Continue current medical therapy with home diuretic regimen and counseled patient on sodium and fluid restricted diet  -If patient unable to tolerate low-dose medical therapy may need to discuss potential for midodrine to allow for diuretic regimen to assist with more euvolemic state and avoid exacerbation although limited options due to hypotension.    Right lower lobe pneumonia  Assessment & Plan  -Continue antibiotic regimen per primary team    COPD, severe (HCC)  Assessment & Plan  -Continue antibiotics and plan of care per primary team and pulmonology  -Patient currently on nasal cannula oxygen.    * Acute and chronic respiratory failure with hypoxia (HCC)  Assessment & Plan  -Likely multifactorial in the setting of known significant COPD on nasal cannula oxygen at baseline along with heart failure with reduced ejection fraction and possible pneumonia  -Continue current medical therapy as tolerated        Subjective:   Patient seen and examined.  Per patient, he denies any chest pain, palpitations, lightness dizziness, loss of consciousness and notes shortness of breath around baseline.  He does note some issues with insomnia overnight however was able to get some sleep earlier this morning.      Summary comments:  -Reinitiation of home furosemide today will see how patient's blood pressure tolerates regimen  Stormy Molina   MRN: [id number]    Department:  [facility]   Date of Visit:  6/29/2018           Disclosure     Insurance plans vary and the physician(s) referred by the ER may not be covered by your plan.  Please contact you "including Jardiance, metoprolol and furosemide  -Continue aspirin and atorvastatin  -If requiring up titration and diuretic regimen may need assistance of midodrine due to hypotension however will monitor closely.      Vitals: Blood pressure 102/67, pulse 96, temperature (!) 97.4 °F (36.3 °C), resp. rate 18, height 5' 8\" (1.727 m), weight 76.8 kg (169 lb 3.3 oz), SpO2 100%.,   Orthostatic Blood Pressures      Flowsheet Row Most Recent Value   Blood Pressure 102/67 filed at 01/17/2024 0722   Patient Position - Orthostatic VS Lying filed at 01/16/2024 0634        ,   Weight (last 2 days)       Date/Time Weight    01/15/24 0500 76.8 (169.2)            Physical Exam:  Physical Exam  Vitals reviewed.   Constitutional:       General: He is not in acute distress.     Appearance: He is not diaphoretic.   HENT:      Head: Normocephalic and atraumatic.      Comments: Nasal cannula oxygen in place  Eyes:      General:         Right eye: No discharge.         Left eye: No discharge.   Neck:      Comments: Trachea midline, minimal JVD present  Cardiovascular:      Rate and Rhythm: Normal rate and regular rhythm.      Heart sounds:      No friction rub.   Pulmonary:      Effort: No respiratory distress.      Breath sounds: Wheezing present.   Chest:      Chest wall: No tenderness.   Abdominal:      General: Bowel sounds are normal.      Palpations: Abdomen is soft.      Tenderness: There is no abdominal tenderness. There is no rebound.   Musculoskeletal:      Right lower leg: Edema (1+) present.      Left lower leg: Edema (1+) present.   Skin:     General: Skin is warm and dry.   Neurological:      Mental Status: He is alert.      Comments: Awake, alert, able to answer questions appropriately, hard of hearing   Psychiatric:         Mood and Affect: Mood normal.         Behavior: Behavior normal.          Medications:      Current Facility-Administered Medications:     acetaminophen (TYLENOL) tablet 650 mg, 650 mg, Oral, Q6H PRN, " CARE PHYSICIAN AT ONCE OR RETURN IMMEDIATELY TO THE EMERGENCY DEPARTMENT. If you have been prescribed any medication(s), please fill your prescription right away and begin taking the medication(s) as directed.   If you believe that any of the medications Christen Larry PA-C, 650 mg at 01/15/24 2121    ALPRAZolam (XANAX) tablet 0.25 mg, 0.25 mg, Oral, HS PRN, Christen Larry PA-C, 0.25 mg at 01/16/24 2227    aspirin chewable tablet 81 mg, 81 mg, Oral, HS, Christen Larry PA-C, 81 mg at 01/16/24 2227    atorvastatin (LIPITOR) tablet 80 mg, 80 mg, Oral, Daily With Dinner, Christen Larry PA-C, 80 mg at 01/16/24 1711    budesonide (PULMICORT) inhalation solution 0.5 mg, 0.5 mg, Nebulization, BID, Tesha Avila DO, 0.5 mg at 01/17/24 0724    calcium carbonate (TUMS) chewable tablet 1,000 mg, 1,000 mg, Oral, BID PRN, Christen Larry PA-C, 1,000 mg at 01/17/24 0046    ceFAZolin (ANCEF) IVPB (premix in dextrose) 2,000 mg 50 mL, 2,000 mg, Intravenous, Q8H, Hyacinth Espana MD, Last Rate: 100 mL/hr at 01/17/24 0747, 2,000 mg at 01/17/24 0747    cholecalciferol (VITAMIN D3) tablet 1,000 Units, 1,000 Units, Oral, Daily, Christen Larry PA-C, 1,000 Units at 01/16/24 0848    dextromethorphan-guaiFENesin (ROBITUSSIN DM) oral syrup 5 mL, 5 mL, Oral, TID PRN, Christen Larry PA-C, 5 mL at 01/16/24 2237    Empagliflozin (JARDIANCE) tablet 10 mg, 10 mg, Oral, Daily, Christen Larry PA-C, 10 mg at 01/16/24 0854    enoxaparin (LOVENOX) subcutaneous injection 40 mg, 40 mg, Subcutaneous, Daily, Christen Larry PA-C, 40 mg at 01/16/24 0847    famotidine (PEPCID) tablet 20 mg, 20 mg, Oral, HS, Christen Larry PA-C, 20 mg at 01/16/24 2228    fluticasone (FLONASE) 50 mcg/act nasal spray 1 spray, 1 spray, Nasal, BID, Christen Larry PA-C, 1 spray at 01/16/24 1711    formoterol (PERFOROMIST) nebulizer solution 20 mcg, 20 mcg, Nebulization, BID, Tesha Avila DO, 20 mcg at 01/17/24 0724    furosemide (LASIX) tablet 20 mg, 20 mg, Oral, Daily, Rubio Sykes DO    gabapentin (NEURONTIN) capsule 300 mg, 300 mg, Oral, HS, Christen Larry PA-C, 300 mg at 01/16/24 2227     HYDROcodone-acetaminophen (NORCO) 5-325 mg per tablet 1 tablet, 1 tablet, Oral, Q6H PRN, Christen Larry PA-C    levalbuterol (XOPENEX) inhalation solution 1.25 mg, 1.25 mg, Nebulization, TID, Tesha TomaszjanelcynthiaDO, 1.25 mg at 01/17/24 0724    methocarbamol (ROBAXIN) tablet 500 mg, 500 mg, Oral, TID PRN, Christen Larry PA-C    metoprolol succinate (TOPROL-XL) 24 hr tablet 12.5 mg, 12.5 mg, Oral, BID, Christen Larry PA-C, 12.5 mg at 01/15/24 2119    ondansetron (ZOFRAN) injection 4 mg, 4 mg, Intravenous, Q6H PRN, Christen Larry PA-C    pantoprazole (PROTONIX) EC tablet 20 mg, 20 mg, Oral, Early Morning, Christen Larry PA-C, 20 mg at 01/17/24 0752    potassium chloride oral solution 40 mEq, 40 mEq, Oral, Daily, Christen Larry PA-C, 40 mEq at 01/16/24 0847    [COMPLETED] predniSONE tablet 40 mg, 40 mg, Oral, Daily, 40 mg at 01/15/24 0917 **FOLLOWED BY** predniSONE tablet 30 mg, 30 mg, Oral, Daily, 30 mg at 01/16/24 0848 **FOLLOWED BY** [START ON 1/19/2024] predniSONE tablet 20 mg, 20 mg, Oral, Daily **FOLLOWED BY** [START ON 1/22/2024] predniSONE tablet 10 mg, 10 mg, Oral, Daily **FOLLOWED BY** [START ON 1/25/2024] predniSONE tablet 5 mg, 5 mg, Oral, Daily, Christen Larry PA-C    sodium chloride (OCEAN) 0.65 % nasal spray 1 spray, 1 spray, Each Nare, Q1H PRN, Christen Larry PA-C, 1 spray at 01/16/24 2229    Insert peripheral IV, , , Once **AND** sodium chloride (PF) 0.9 % injection 3 mL, 3 mL, Intravenous, Q1H PRN, Bryce Gong MD    sodium chloride 0.9 % bolus 1 mL, 1 mL, Intravenous, Once, Bryce Gong MD     Labs & Results:    Troponins:    Results from last 7 days   Lab Units 01/16/24  0937 01/16/24  0734 01/14/24  0421   HS TNI 0HR ng/L  --  40  --    HS TNI 2HR ng/L 32  --  37   HSTNI D2 ng/L -8  --  -13   HS TNI 4HR ng/L  --   --  38   HSTNI D4 ng/L  --   --  -12        BNP:   Results from last 6 Months   Lab Units 01/14/24  2367  10/21/23  1639   BNP pg/mL 3,360* 1,892*     CBC with diff:   Results from last 7 days   Lab Units 01/17/24  0548 01/16/24  0511   WBC Thousand/uL 12.57* 10.96*   HEMOGLOBIN g/dL 12.8 13.2   HEMATOCRIT % 42.4 44.0   MCV fL 107* 105*   PLATELETS Thousands/uL 158 161     TSH:     CMP:   Results from last 7 days   Lab Units 01/17/24  0548 01/16/24  0511 01/15/24  0852 01/14/24  0217   POTASSIUM mmol/L 4.9 4.8 4.6 4.4   CHLORIDE mmol/L 103 104 102 101   CO2 mmol/L 31 31 32 30   BUN mg/dL 23 20 18 18   CREATININE mg/dL 1.47* 1.37* 1.55* 1.65*   AST U/L  --   --  40* 28   ALT U/L  --   --  30 22   EGFR ml/min/1.73sq m 44 48 41 38     Lipid Profile:     Coags:   Results from last 7 days   Lab Units 01/14/24  0421   INR  1.18

## 2024-01-18 ENCOUNTER — TRANSITIONAL CARE MANAGEMENT (OUTPATIENT)
Dept: FAMILY MEDICINE CLINIC | Facility: CLINIC | Age: 79
End: 2024-01-18

## 2024-01-18 ENCOUNTER — HOME CARE VISIT (OUTPATIENT)
Dept: HOME HEALTH SERVICES | Facility: HOME HEALTHCARE | Age: 79
End: 2024-01-18
Payer: COMMERCIAL

## 2024-01-18 ENCOUNTER — TELEMEDICINE (OUTPATIENT)
Dept: PULMONOLOGY | Facility: CLINIC | Age: 79
End: 2024-01-18

## 2024-01-18 VITALS
HEIGHT: 68 IN | HEART RATE: 115 BPM | BODY MASS INDEX: 25.73 KG/M2 | DIASTOLIC BLOOD PRESSURE: 73 MMHG | SYSTOLIC BLOOD PRESSURE: 125 MMHG

## 2024-01-18 VITALS
DIASTOLIC BLOOD PRESSURE: 78 MMHG | RESPIRATION RATE: 18 BRPM | TEMPERATURE: 97.3 F | HEART RATE: 87 BPM | SYSTOLIC BLOOD PRESSURE: 122 MMHG | OXYGEN SATURATION: 92 %

## 2024-01-18 DIAGNOSIS — J44.9 COPD, SEVERE (HCC): ICD-10-CM

## 2024-01-18 DIAGNOSIS — J96.11 CHRONIC RESPIRATORY FAILURE WITH HYPOXIA (HCC): ICD-10-CM

## 2024-01-18 DIAGNOSIS — G47.33 OSA ON CPAP: Chronic | ICD-10-CM

## 2024-01-18 DIAGNOSIS — J44.1 COPD WITH ACUTE EXACERBATION (HCC): Primary | ICD-10-CM

## 2024-01-18 DIAGNOSIS — I50.20 CONGESTIVE HEART FAILURE WITH LEFT VENTRICULAR SYSTOLIC DYSFUNCTION (LVSD) (HCC): ICD-10-CM

## 2024-01-18 DIAGNOSIS — Z99.81 OXYGEN DEPENDENT: ICD-10-CM

## 2024-01-18 DIAGNOSIS — J69.0 ASPIRATION PNEUMONIA OF RIGHT LOWER LOBE, UNSPECIFIED ASPIRATION PNEUMONIA TYPE (HCC): ICD-10-CM

## 2024-01-18 PROCEDURE — G0299 HHS/HOSPICE OF RN EA 15 MIN: HCPCS

## 2024-01-18 NOTE — UTILIZATION REVIEW
NOTIFICATION OF ADMISSION DISCHARGE   This is a Notification of Discharge from [facility]. Please be advised that this patient has been discharge from our facility. Below you will find the admission and discharge date and time including the patient’s disposition.   UTILIZATION REVIEW CONTACT:  Bhavna Olmstead  Utilization   Network Utilization Review Department  Phone: [phone] carefully listen to the prompts. All voicemails are confidential.  Email: [email or web address]     ADMISSION INFORMATION  PRESENTATION DATE: 1/14/2024  2:13 AM  OBERVATION ADMISSION DATE:   INPATIENT ADMISSION DATE: 1/14/24  3:23 AM   DISCHARGE DATE: 1/17/2024  5:47 PM   DISPOSITION:Home with Home Health Care    Network Utilization Review Department  ATTENTION: Please call with any questions or concerns to [phone] and carefully listen to the prompts so that you are directed to the right person. All voicemails are confidential.   For Discharge needs, contact Care Management DC Support Team at [phone] opt. [phone]  Send all requests for admission clinical reviews, approved or denied determinations and any other requests to dedicated fax number below belonging to the campus where the patient is receiving treatment. List of dedicated fax numbers for the Facilities:  FACILITY NAME UR FAX NUMBER   ADMISSION DENIALS (Administrative/Medical Necessity) [phone]   DISCHARGE SUPPORT TEAM ([facility]) [phone]   PARENT CHILD HEALTH (Maternity/NICU/Pediatrics) [phone]   [facility] [phone]   [facility] [phone]   [facility] [phone]   [facility] [phone]   [facility] [phone]   [facility] [phone]   [facility] [phone]   [facility]  [phone]   [facility] [phone]   [facility] [phone]   [facility] [phone]

## 2024-01-18 NOTE — PROGRESS NOTES
COPD Virtual Visit  Patient contacted at his home in [place], [place], a [facility] where I am licensed to practice.    Reason for visit is COPD exacerbation    This virtual check-in was done via Telephone.    Encounter provider INOCENTE Soria    Provider located at  [facility] [address]  [address]  [facility] [address] [phone]      Recent Visits  No visits were found meeting these conditions.  Showing recent visits within past 7 days and meeting all other requirements  Today's Visits  Date Type Provider Dept   01/18/24 Telemedicine INOCENTE Soria  Pulmonary [facility]   Showing today's visits and meeting all other requirements  Future Appointments  No visits were found meeting these conditions.  Showing future appointments within next 150 days and meeting all other requirements       Patient agrees to participate in a virtual check in via telephone or video visit instead of presenting to the office to address urgent/immediate medical needs. Patient is aware this is a billable service.      After connecting through telephone using [employer] Teams the patient was identified by name and date of birth. Seun Alva was informed that this was a telemedicine visit and that the exam was being conducted confidentially over secure lines.  My office door was closed. No one else was in the room.  He acknowledged consent and understanding of privacy and security of the virtual check-in visit.  I informed the patient that I have reviewed his record in Epic and presented the opportunity for him to ask any questions regarding the visit today. The patient initiated communication and agreed to participate.      Progress note - Pulmonary Medicine   Seun Alva 78 y.o. male MRN: [id number]         Assessment/Plan:    Diagnoses and all orders for this visit:    COPD with acute exacerbation (HCC)    COPD, severe  ([facility])  Pulmicort/Perforomist twice per day as doing  DuoNeb 3-4x per day as doing  Complete antibiotic and prednisone as ordered  I feel he could benefit from palliative care to ensure he is on the right track with medications and arranging assistance at home - COPD is very severe and he has frequent exacerbations. This may need to be an external referral to get services in [facility]. He questioned hospice care - he will speak with PCP as well about this issue although I am placing the referral today.    Chronic respiratory failure with hypoxia ([facility])  Continue 3L/m supplemental O2 and maintain saturations >88%.    Aspiration pneumonia of right lower lobe, unspecified aspiration pneumonia type ([facility])  Complete remaining antibiotic doses  Complete steroids as directed    LAMBERTO on CPAP  Continue CPAP at all sleep times both overnight and during naps    Symptom Assessment and Planning:   Early recognition with action plan: reviewed today    Symptom assessment: SOB, cough, wheezing - that are worsening    Follow-up and Education Provided:   Medication reconciliation complete: Yes   Need for vaccination: Up to date   Pulmonary rehab: ordered prior and he is considering.   Smoking cessation: Nonsmoker since 1995   Lung cancer screening: Out of the window   Inhaler use: He is on nebulizer regimen at this time.      Return in about 6 weeks (around 2/29/2024) for in [place].    All of Shlomo's questions were answered prior to ending the call today. He will follow-up with Manny Steele in 3 months or sooner should the need arise. He is aware to call our office with any further questions or concerns.    Virtual visit time component:  I spent 12 minutes with the speaking with the patient and > 50% was spent in counseling coordination of care.    Total visit time for chart and diagnostic data review, telephonic or video conference communication with the patient, and documentation: 27    I have personally spent 12 minutes  reviewing the chart and imaging prior to the visit.    I have personally spent 12 minutes on the phone with the patient.     I have personally spent 3 minutes reviewing imaging, laboratory results and other testing results with the patient.    ______________________________________________________________________    HPI:    Seun Alva is being evaluated by virtual visit for follow-up after hospital admission 1/14/24-1/17/24 for right lower lobe pneumonia. He is deaf and used [employer] phone for assistance. He was treated with BiPAP for hypoxia in the ER and eventually transitioned to CPAP. He was also  treated in the hospital with antibiotic and discharged home on prednisone in addition to his usual 3L/m supplemental oxygen.    Today he is feeling ongoing shortness of breath since coming home from the hospital but he does feel much better. He feels about 90% back to his usual baseline. He reports he is wearing 4L/m O2 at this time. He has not taken any treatments as yet today. He denies coughing but does feel some wheezing. He was able to sleep last night and does wear CPAP every night - woke up a few times and was able to fall back asleep. He awoke due to breathing issues.    He is taking DuoNeb 4x per day usually but none yet today -  He is taking budesonide/perforomist twice per day but none yet today -  Apparently woke up around 2:30PM today.    He is moving slow and has less energy today due to fatigue and the recent illness.    Current Medications:    Current Outpatient Medications:     ALPRAZolam (XANAX) 0.25 mg tablet, Take 1 tablet (0.25 mg total) by mouth daily at bedtime as needed for anxiety, Disp: 15 tablet, Rfl: 0    amoxicillin-clavulanate (AUGMENTIN) 875-125 mg per tablet, Take 1 tablet by mouth every 12 (twelve) hours for 5 days, Disp: 10 tablet, Rfl: 0    aspirin 81 mg chewable tablet, 81 mg daily at bedtime, Disp: , Rfl:     cholecalciferol (VITAMIN D3) 1,000 units tablet, Take 1,000 Units by mouth  daily, Disp: , Rfl:     dextromethorphan-guaiFENesin (ROBITUSSIN DM)  mg/5 mL syrup, Take 5 mL by mouth 3 (three) times a day as needed for cough, Disp: 354 mL, Rfl: 0    Empagliflozin (Jardiance) 10 MG TABS tablet, Take 1 tablet (10 mg total) by mouth every morning, Disp: 90 tablet, Rfl: 5    famotidine (PEPCID) 20 mg tablet, TAKE 1 TABLET BY MOUTH DAILY AT  BEDTIME, Disp: 100 tablet, Rfl: 2    fluticasone (FLONASE) 50 mcg/act nasal spray, 1 spray into each nostril 2 (two) times a day, Disp: 9.9 mL, Rfl: 0    formoterol (PERFOROMIST) 20 MCG/2ML nebulizer solution, Take 2 mL (20 mcg total) by nebulization 2 (two) times a day, Disp: 180 mL, Rfl: 0    furosemide (LASIX) 40 mg tablet, Take 0.5 tablets (20 mg total) by mouth daily, Disp: 90 tablet, Rfl: 2    gabapentin (NEURONTIN) 300 mg capsule, Take 1 capsule (300 mg total) by mouth daily at bedtime, Disp: 90 capsule, Rfl: 0    Humidifier MISC, Use continuous, Disp: 1 each, Rfl: 0    HYDROcodone-acetaminophen (Norco) 5-325 mg per tablet, Take 1 tablet by mouth every 6 (six) hours as needed for pain Max Daily Amount: 4 tablets, Disp: 15 tablet, Rfl: 0    ipratropium-albuterol (DUO-NEB) 0.5-2.5 mg/3 mL nebulizer solution, , Disp: , Rfl:     methocarbamol (ROBAXIN) 500 mg tablet, Take 1 tablet (500 mg total) by mouth 3 (three) times a day as needed for muscle spasms, Disp: 30 tablet, Rfl: 0    metoprolol succinate (TOPROL-XL) 25 mg 24 hr tablet, Take 0.5 tablets (12.5 mg total) by mouth 2 (two) times a day, Disp: 30 tablet, Rfl: 0    naloxone (NARCAN) 4 mg/0.1 mL nasal spray, Administer 1 spray into a nostril. If no response after 2-3 minutes, give another dose in the other nostril using a new spray., Disp: 1 each, Rfl: 1    nystatin (MYCOSTATIN) 500,000 units/5 mL suspension, 5 ml orally (swish and spit/swallow) every four hours while awake, Disp: 180 mL, Rfl: 0    omeprazole (PriLOSEC) 20 mg delayed release capsule, take 1 capsule by mouth once daily, Disp: 30  capsule, Rfl: 3    oxygen gas, Inhale 2 L/min continuous 2LPM at rest and 3-4 LPM with activity per D Cedric FANG notes, Disp: , Rfl:     potassium chloride 10% oral solution, Take 30 mL (40 mEq total) by mouth daily, Disp: , Rfl:     predniSONE 10 mg tablet, Take 4 tablets (40 mg total) by mouth daily for 3 days, THEN 3 tablets (30 mg total) daily for 3 days, THEN 2 tablets (20 mg total) daily for 3 days, THEN 1 tablet (10 mg total) daily for 3 days, THEN 0.5 tablets (5 mg total) daily for 3 days., Disp: 32 tablet, Rfl: 0    rosuvastatin (CRESTOR) 40 MG tablet, TAKE 1 TABLET DAILY (Patient taking differently: Take 40 mg by mouth daily), Disp: 100 tablet, Rfl: 3    sodium chloride (OCEAN) 0.65 % nasal spray, 1 spray into each nostril every hour as needed for congestion, Disp: 30 mL, Rfl: 0    budesonide (PULMICORT) 0.5 mg/2 mL nebulizer solution, Take 2 mL (0.5 mg total) by nebulization 2 (two) times a day Rinse mouth after use., Disp: 180 mL, Rfl: 3  No current facility-administered medications for this visit.    Review of Systems:  Review of Systems   Constitutional:  Negative for chills, diaphoresis and fatigue.   HENT:  Negative for congestion and postnasal drip.    Respiratory:  Positive for cough and shortness of breath. Negative for chest tightness and wheezing.    Cardiovascular:  Negative for chest pain, palpitations and leg swelling.   Gastrointestinal:  Negative for abdominal pain.   All other systems reviewed and are negative.      Past medical history, surgical history, and family history were reviewed and updated as appropriate.    Social history updates:  Social History     Tobacco Use   Smoking Status Former    Current packs/day: 0.00    Average packs/day: 2.0 packs/day for 35.5 years (71.0 ttl pk-yrs)    Types: Cigarettes    Start date: 1960    Quit date: 1995    Years since quittin.5   Smokeless Tobacco Never   Tobacco Comments    stopped smoking the day i had a heart attack  "      PhysicalExamination:  Vitals: Blood pressure 125/73, pulse (!) 115, height 5' 8\" (1.727 m). Body mass index is 25.73 kg/m². Oxygen Therapy  SpO2:  (unable to obtain)    Patient was able to speak in complete sentences with no shortness of breath. He did not audibly cough or wheeze. He was not mouth-breathing.    Patient was evaluated telephonically, no further physical examination could be performed.    Diagnostic Data:  Labs:  I personally reviewed the most recent laboratory data pertinent to today's visit.    Lab Results   Component Value Date    WBC 12.57 (H) 01/17/2024    HGB 12.8 01/17/2024    HCT 42.4 01/17/2024     (H) 01/17/2024     01/17/2024     Lab Results   Component Value Date    SODIUM 141 01/17/2024    K 4.9 01/17/2024    CO2 31 01/17/2024     01/17/2024    BUN 23 01/17/2024    CREATININE 1.47 (H) 01/17/2024    CALCIUM 9.0 01/17/2024       PFT results:  The most recent pulmonary function tests were reviewed.  5/15/23: Severe obstruction with reduced vital capacity due to air trapping; no improvement post-bronchodilator. Severely reduced diffusion capacity.    Imaging:  I personally reviewed the images on the PAC system pertinent to today's visit.  CXR 1/14/24  IMPRESSION:  Right basilar opacity concerning for pulmonary infiltrate including aspiration. Small associated right basilar effusion.      INOCENTE Soria    Portions of the record may have been created with voice recognition software.  Occasional wrong word or \"sound a like\" substitutions may have occurred due to the inherent limitations of voice recognition software.  Read the chart carefully and recognize, using context, where substitutions have occurred.  "

## 2024-01-19 ENCOUNTER — HOME CARE VISIT (OUTPATIENT)
Dept: HOME HEALTH SERVICES | Facility: HOME HEALTHCARE | Age: 79
End: 2024-01-19
Payer: COMMERCIAL

## 2024-01-19 VITALS — SYSTOLIC BLOOD PRESSURE: 100 MMHG | DIASTOLIC BLOOD PRESSURE: 58 MMHG | HEART RATE: 105 BPM | OXYGEN SATURATION: 70 %

## 2024-01-19 LAB
BACTERIA BLD CULT: NORMAL
BACTERIA BLD CULT: NORMAL

## 2024-01-19 PROCEDURE — G0151 HHCP-SERV OF PT,EA 15 MIN: HCPCS

## 2024-01-21 NOTE — CASE COMMUNICATION
PT initial evaluation completed.  plan to see 1xwkx3 weeks to provide education for hep, energy conservation techniques, gait training.

## 2024-01-22 ENCOUNTER — HOME CARE VISIT (OUTPATIENT)
Dept: HOME HEALTH SERVICES | Facility: HOME HEALTHCARE | Age: 79
End: 2024-01-22
Payer: COMMERCIAL

## 2024-01-22 ENCOUNTER — TELEPHONE (OUTPATIENT)
Dept: FAMILY MEDICINE CLINIC | Facility: CLINIC | Age: 79
End: 2024-01-22

## 2024-01-22 VITALS
RESPIRATION RATE: 22 BRPM | OXYGEN SATURATION: 96 % | SYSTOLIC BLOOD PRESSURE: 84 MMHG | TEMPERATURE: 97.2 F | DIASTOLIC BLOOD PRESSURE: 52 MMHG | HEART RATE: 95 BPM

## 2024-01-22 PROCEDURE — G0299 HHS/HOSPICE OF RN EA 15 MIN: HCPCS

## 2024-01-22 NOTE — TELEPHONE ENCOUNTER
Voicemail:  Hi, this is Annamarie from [facility]. I'm calling about Shlomo Alva. His date of birth is [birth year]. I'm out to see him today and since he's been home from the hospital he has had blood pressures that have been like in the 80s, over 40s to 50s. But he's not feeling bad with that. So I just wanted to report that. And it looks like he's supposed to have a follow up with the doctor, because the discharge says something about the doctor ordering blood work in one week to recheck the kidneys. But there's not an actual order in there. So we can do that. If somebody we'll get back to me or if you want to [facility], that would be great. My number is [phone]. Alright, thank you. Bye. I hate.

## 2024-01-22 NOTE — TELEPHONE ENCOUNTER
The only medications that could be contributing are metoprolol or Lasix however they are likely affecting BP minimally, please confirm his medication list to make sure. If what he have is correct, I'll reach out to his cardiologist. Any symptoms, go to ED.

## 2024-01-23 ENCOUNTER — TELEPHONE (OUTPATIENT)
Dept: CARDIOLOGY CLINIC | Facility: CLINIC | Age: 79
End: 2024-01-23

## 2024-01-23 DIAGNOSIS — I95.89 OTHER SPECIFIED HYPOTENSION: Primary | ICD-10-CM

## 2024-01-23 RX ORDER — MIDODRINE HYDROCHLORIDE 2.5 MG/1
2.5 TABLET ORAL
Qty: 90 TABLET | Refills: 6 | Status: SHIPPED | OUTPATIENT
Start: 2024-01-23

## 2024-01-23 NOTE — TELEPHONE ENCOUNTER
Spoke with patients daughter, confirmed patient is taking Metoprolol 12.5mg BID and Lasix 20mg daily. Daughter said he has been experiencing low BP for a while now. Will go to ED if he develops any sx and wait to hear back from us/cardiology.

## 2024-01-23 NOTE — TELEPHONE ENCOUNTER
-Attempted to call patient and daughter about lower blood pressure readings.  Unfortunately when patient is off his diuretic regimen has significant volume retention and issues however when on medical therapy blood pressure issues remain present.  I would like to trial low-dose midodrine therapy but need to discuss this with patient and daughter prior to initiation.  I did leave a message on their phone with my name and office number to call back for a better time to speak.    -I was able to speak with patient's daughter.  As patient has had significantly lower blood pressure readings and has had some issues with symptoms from this will attempt midodrine 2.5 mg 3 times daily.  Will check BMP in 3 days to monitor renal function electrolytes and patient's daughter will monitor for any issues with urinary retention.  Will place hold parameter for systolic blood pressure greater than 110 mmHg on medications to avoid hypertension and patient.  Patient's daughter noted understanding and was agreeable.

## 2024-01-24 ENCOUNTER — HOME CARE VISIT (OUTPATIENT)
Dept: HOME HEALTH SERVICES | Facility: HOME HEALTHCARE | Age: 79
End: 2024-01-24
Payer: COMMERCIAL

## 2024-01-24 VITALS — OXYGEN SATURATION: 92 % | HEART RATE: 75 BPM | SYSTOLIC BLOOD PRESSURE: 100 MMHG | DIASTOLIC BLOOD PRESSURE: 62 MMHG

## 2024-01-24 PROCEDURE — G0151 HHCP-SERV OF PT,EA 15 MIN: HCPCS

## 2024-01-25 ENCOUNTER — HOME CARE VISIT (OUTPATIENT)
Dept: HOME HEALTH SERVICES | Facility: HOME HEALTHCARE | Age: 79
End: 2024-01-25
Payer: COMMERCIAL

## 2024-01-25 PROCEDURE — G0155 HHCP-SVS OF CSW,EA 15 MIN: HCPCS

## 2024-01-26 ENCOUNTER — LAB REQUISITION (OUTPATIENT)
Dept: LAB | Facility: HOSPITAL | Age: 79
End: 2024-01-26
Payer: COMMERCIAL

## 2024-01-26 ENCOUNTER — HOME CARE VISIT (OUTPATIENT)
Dept: HOME HEALTH SERVICES | Facility: HOME HEALTHCARE | Age: 79
End: 2024-01-26
Payer: COMMERCIAL

## 2024-01-26 DIAGNOSIS — I10 ESSENTIAL (PRIMARY) HYPERTENSION: ICD-10-CM

## 2024-01-26 LAB
ANION GAP SERPL CALCULATED.3IONS-SCNC: 11 MMOL/L
BUN SERPL-MCNC: 21 MG/DL (ref 5–25)
CALCIUM SERPL-MCNC: 9 MG/DL (ref 8.4–10.2)
CHLORIDE SERPL-SCNC: 98 MMOL/L (ref 96–108)
CO2 SERPL-SCNC: 35 MMOL/L (ref 21–32)
CREAT SERPL-MCNC: 1.15 MG/DL (ref 0.6–1.3)
GFR SERPL CREATININE-BSD FRML MDRD: 60 ML/MIN/1.73SQ M
GLUCOSE SERPL-MCNC: 81 MG/DL (ref 65–140)
POTASSIUM SERPL-SCNC: 3.7 MMOL/L (ref 3.5–5.3)
SODIUM SERPL-SCNC: 144 MMOL/L (ref 135–147)

## 2024-01-26 PROCEDURE — G0299 HHS/HOSPICE OF RN EA 15 MIN: HCPCS

## 2024-01-26 PROCEDURE — 80048 BASIC METABOLIC PNL TOTAL CA: CPT | Performed by: INTERNAL MEDICINE

## 2024-01-29 ENCOUNTER — APPOINTMENT (OUTPATIENT)
Dept: LAB | Facility: CLINIC | Age: 79
End: 2024-01-29
Payer: COMMERCIAL

## 2024-01-29 ENCOUNTER — APPOINTMENT (OUTPATIENT)
Dept: RADIOLOGY | Facility: CLINIC | Age: 79
End: 2024-01-29
Payer: COMMERCIAL

## 2024-01-29 ENCOUNTER — OFFICE VISIT (OUTPATIENT)
Dept: FAMILY MEDICINE CLINIC | Facility: CLINIC | Age: 79
End: 2024-01-29
Payer: COMMERCIAL

## 2024-01-29 ENCOUNTER — HOME CARE VISIT (OUTPATIENT)
Dept: HOME HEALTH SERVICES | Facility: HOME HEALTHCARE | Age: 79
End: 2024-01-29
Payer: COMMERCIAL

## 2024-01-29 VITALS
BODY MASS INDEX: 25.52 KG/M2 | OXYGEN SATURATION: 96 % | WEIGHT: 168.4 LBS | SYSTOLIC BLOOD PRESSURE: 120 MMHG | HEIGHT: 68 IN | RESPIRATION RATE: 16 BRPM | HEART RATE: 56 BPM | TEMPERATURE: 97.1 F | DIASTOLIC BLOOD PRESSURE: 70 MMHG

## 2024-01-29 DIAGNOSIS — I25.9 CHRONIC ISCHEMIC HEART DISEASE: ICD-10-CM

## 2024-01-29 DIAGNOSIS — I13.0 HYPERTENSIVE HEART AND CHRONIC KIDNEY DISEASE WITH HEART FAILURE AND STAGE 1 THROUGH STAGE 4 CHRONIC KIDNEY DISEASE, OR UNSPECIFIED CHRONIC KIDNEY DISEASE (HCC): Chronic | ICD-10-CM

## 2024-01-29 DIAGNOSIS — I50.22 CHRONIC SYSTOLIC CONGESTIVE HEART FAILURE (HCC): Chronic | ICD-10-CM

## 2024-01-29 DIAGNOSIS — J69.0 ASPIRATION PNEUMONIA OF RIGHT LOWER LOBE, UNSPECIFIED ASPIRATION PNEUMONIA TYPE (HCC): Primary | ICD-10-CM

## 2024-01-29 DIAGNOSIS — L97.921 SKIN ULCER OF LEFT LOWER LEG, LIMITED TO BREAKDOWN OF SKIN (HCC): ICD-10-CM

## 2024-01-29 DIAGNOSIS — I89.0 LYMPHEDEMA: ICD-10-CM

## 2024-01-29 DIAGNOSIS — I95.89 OTHER SPECIFIED HYPOTENSION: ICD-10-CM

## 2024-01-29 DIAGNOSIS — J96.11 CHRONIC RESPIRATORY FAILURE WITH HYPOXIA (HCC): ICD-10-CM

## 2024-01-29 DIAGNOSIS — E87.6 HYPOKALEMIA: ICD-10-CM

## 2024-01-29 DIAGNOSIS — J69.0 ASPIRATION PNEUMONIA OF RIGHT LOWER LOBE, UNSPECIFIED ASPIRATION PNEUMONIA TYPE (HCC): ICD-10-CM

## 2024-01-29 DIAGNOSIS — R26.2 AMBULATORY DYSFUNCTION: ICD-10-CM

## 2024-01-29 DIAGNOSIS — L97.911 CHRONIC ULCER OF RIGHT LEG, LIMITED TO BREAKDOWN OF SKIN (HCC): ICD-10-CM

## 2024-01-29 DIAGNOSIS — R19.7 INTERMITTENT DIARRHEA: ICD-10-CM

## 2024-01-29 DIAGNOSIS — J44.9 COPD, SEVERE (HCC): ICD-10-CM

## 2024-01-29 DIAGNOSIS — R53.81 PHYSICAL DECONDITIONING: ICD-10-CM

## 2024-01-29 DIAGNOSIS — R13.10 DYSPHAGIA, UNSPECIFIED TYPE: ICD-10-CM

## 2024-01-29 LAB
ALBUMIN SERPL BCP-MCNC: 3.6 G/DL (ref 3.5–5)
ALP SERPL-CCNC: 71 U/L (ref 34–104)
ALT SERPL W P-5'-P-CCNC: 28 U/L (ref 7–52)
ANION GAP SERPL CALCULATED.3IONS-SCNC: 8 MMOL/L
AST SERPL W P-5'-P-CCNC: 22 U/L (ref 13–39)
BASOPHILS # BLD AUTO: 0.03 THOUSANDS/ÂΜL (ref 0–0.1)
BASOPHILS NFR BLD AUTO: 0 % (ref 0–1)
BILIRUB SERPL-MCNC: 1.06 MG/DL (ref 0.2–1)
BUN SERPL-MCNC: 17 MG/DL (ref 5–25)
CALCIUM SERPL-MCNC: 8.8 MG/DL (ref 8.4–10.2)
CHLORIDE SERPL-SCNC: 100 MMOL/L (ref 96–108)
CO2 SERPL-SCNC: 35 MMOL/L (ref 21–32)
CREAT SERPL-MCNC: 1.15 MG/DL (ref 0.6–1.3)
EOSINOPHIL # BLD AUTO: 0.06 THOUSAND/ÂΜL (ref 0–0.61)
EOSINOPHIL NFR BLD AUTO: 1 % (ref 0–6)
ERYTHROCYTE [DISTWIDTH] IN BLOOD BY AUTOMATED COUNT: 17 % (ref 11.6–15.1)
GFR SERPL CREATININE-BSD FRML MDRD: 60 ML/MIN/1.73SQ M
GLUCOSE P FAST SERPL-MCNC: 47 MG/DL (ref 65–99)
HCT VFR BLD AUTO: 46.4 % (ref 36.5–49.3)
HGB BLD-MCNC: 14.2 G/DL (ref 12–17)
IMM GRANULOCYTES # BLD AUTO: 0.09 THOUSAND/UL (ref 0–0.2)
IMM GRANULOCYTES NFR BLD AUTO: 1 % (ref 0–2)
LYMPHOCYTES # BLD AUTO: 0.95 THOUSANDS/ÂΜL (ref 0.6–4.47)
LYMPHOCYTES NFR BLD AUTO: 8 % (ref 14–44)
MAGNESIUM SERPL-MCNC: 2.1 MG/DL (ref 1.9–2.7)
MCH RBC QN AUTO: 31.7 PG (ref 26.8–34.3)
MCHC RBC AUTO-ENTMCNC: 30.6 G/DL (ref 31.4–37.4)
MCV RBC AUTO: 104 FL (ref 82–98)
MONOCYTES # BLD AUTO: 1.09 THOUSAND/ÂΜL (ref 0.17–1.22)
MONOCYTES NFR BLD AUTO: 9 % (ref 4–12)
NEUTROPHILS # BLD AUTO: 9.66 THOUSANDS/ÂΜL (ref 1.85–7.62)
NEUTS SEG NFR BLD AUTO: 81 % (ref 43–75)
NRBC BLD AUTO-RTO: 0 /100 WBCS
PLATELET # BLD AUTO: 206 THOUSANDS/UL (ref 149–390)
PMV BLD AUTO: 11.9 FL (ref 8.9–12.7)
POTASSIUM SERPL-SCNC: 3.4 MMOL/L (ref 3.5–5.3)
PROT SERPL-MCNC: 5.6 G/DL (ref 6.4–8.4)
RBC # BLD AUTO: 4.48 MILLION/UL (ref 3.88–5.62)
SODIUM SERPL-SCNC: 143 MMOL/L (ref 135–147)
WBC # BLD AUTO: 11.88 THOUSAND/UL (ref 4.31–10.16)

## 2024-01-29 PROCEDURE — 99495 TRANSJ CARE MGMT MOD F2F 14D: CPT | Performed by: FAMILY MEDICINE

## 2024-01-29 PROCEDURE — 36415 COLL VENOUS BLD VENIPUNCTURE: CPT

## 2024-01-29 PROCEDURE — 83735 ASSAY OF MAGNESIUM: CPT

## 2024-01-29 PROCEDURE — 80053 COMPREHEN METABOLIC PANEL: CPT

## 2024-01-29 PROCEDURE — 71046 X-RAY EXAM CHEST 2 VIEWS: CPT

## 2024-01-29 PROCEDURE — 93000 ELECTROCARDIOGRAM COMPLETE: CPT | Performed by: FAMILY MEDICINE

## 2024-01-29 PROCEDURE — 85025 COMPLETE CBC W/AUTO DIFF WBC: CPT

## 2024-01-29 NOTE — PROGRESS NOTES
" Seun Alva 1945 male MRN: [id number]    [facility] Medicine Transition of Care Visit      SUBJECTIVE    CC: NAILA Visit     Transitional Care Management Review:  Seun Alva is a 78 y.o. male here for TCM follow up. He was admitted 1/14-1/17 for acute on chronic respiratory failure. Hospital course as per discharge summary as below:    \"Right lower lobe pneumonia  Assessment & Plan  Chest x-ray with right basilar opacity, possible aspiration  Sputum culture with Klebsiella pneumoniae sensitive to cefazolin  Cefepime/Zithromax discontinued  Blood cultures negative to date  COVID/flu/RSV negative  Strep pneumo/urine Legionella negative  Swallow eval appreciated  Continue oxygen as needed  Procalcitonin stable  Patient will be discharged on Augmentin to complete course of therapy    He reports he is watching his diet carefully at home, chopping up/pureeing foods, following modified diet.   He reports he finished Augmentin, cough seems to be at baseline, breathing at baseline.   Will obtain chest XR.      Chronic systolic congestive heart failure (HCC)  Assessment & Plan      Wt Readings from Last 3 Encounters:   01/15/24 76.8 kg (169 lb 3.3 oz)   01/08/24 78 kg (172 lb)   01/04/24 77.6 kg (171 lb)      With component of fluid overload  Continue home medications  Was initially on IV Lasix, held due to elevated creatinine.  Resume Lasix   Monitor PENG's and daily weight    He is experiencing lower extremity swelling, currently taking Lasix 20 mg BID however reports he is taking an extra full tablet sometimes to help with swelling. Would like to go to 40 mg BID. Advised we can try this, will monitor kidney function very closely.        Hypertensive heart and chronic kidney disease with heart failure and stage 1 through stage 4 chronic kidney disease, or unspecified chronic kidney disease (HCC)  Assessment & Plan      Wt Readings from Last 3 Encounters:   01/15/24 76.8 kg (169 lb 3.3 oz)   01/08/24 78 kg (172 lb) " "  01/04/24 77.6 kg (171 lb)      BP stable continue home medications  With component of acute kidney injury.  Creatinine has been relatively stable with mild fluctuation.  Lasix has been resumed.  Monitor urine output  Follow-up blood work with PCP in 1 week    Blood work ordered.      Dysphagia   Assessment & Plan  Mechanical soft diet with nectar thick liquids.  Swallow eval appreciated     Elevated troponin  Assessment & Plan  Suspect secondary to hypoxia  Cardiology input appreciated     COPD, severe (HCC)  Assessment & Plan  Patient with shortness of breath and wheezing was requiring BiPAP in the ED  Pulmonology input appreciated  Continue nebulizers, Pulmicort  Continue steroid taper  Currently back to home O2 requirement of 3 L nasal cannula    On home oxygen requirement, completed prednisone taper.      LAMBERTO on CPAP  Assessment & Plan  CPAP At bedtime     Coronary artery disease involving native coronary artery of native heart without angina pectoris  Assessment & Plan  Continue home medications     * Acute and chronic respiratory failure with hypoxia (HCC)  Assessment & Plan  Patient presented to ED very hypoxic was on BiPAP initially and transitioned down to CPAP secondary to pneumonia and COPD exacerbation  Patient is chronically on 3 L nasal cannula at home  Patient has improved with steroids, diuresis, antibiotics  Pulmonology and cardiology input appreciated  Titrated back down to home O2 requirement  Continue prednisone taper  Resume diuretics\"    \"Seun Alva is a 78 y.o. male patient who originally presented to the hospital on 1/14/2024 due to shortness of breath.  Patient admitted with suspected pneumonia and COPD exacerbation.  Patient was started on antibiotics.  Infectious workup was initiated.  Also with suspected component of fluid overload and started on IV Lasix.  Pulmonology and cardiology were both consulted.  Patient was initially on BiPAP and titrated off within 24 hours of admission. " " Patient gradually improved and was returned back to baseline O2 requirement of 3 L nasal cannula continuously.  Sputum culture was positive for Klebsiella pneumonia.  Antibiotics were adjusted accordingly.  Diuretics were transitioned to oral.  Patient did have a bump in creatinine and diuretics were held for 1 day.  Patient was seen by speech therapy who recommended modified diet.  Patient overall has improved from admission.  Patient does have chronic comorbidities as well as overall debility.  PT/OT was consulted and recommended home with home care.  Case management has set up patient with continued home care.  Patient discharged on Augmentin, steroid taper, and home Lasix.  Advised to have repeat blood work in 1 week to monitor kidney function. \"    During the TCM phone call patient stated:    TCM Call       Date and time call was made  1/18/2024  9:31 AM    Hospital care reviewed  Records reviewed    Patient was hospitialized at  [facility]    Date of Admission  01/14/24    Date of discharge  01/17/24    Diagnosis  Acute and chronic respiratory failure with hypoxia    Disposition  Home    Current Symptoms  None  Blood pressure low    Cough Severity  Moderate    Shortness of breath severity  Moderate    Weakness severity  Moderate; Severe          TCM Call       Post hospital issues  None    Scheduled for follow up?  Yes    Did you obtain your prescribed medications  Yes    Do you need help managing your prescriptions or medications  No    Is transportation to your appointment needed  No    I have advised the patient to call PCP with any new or worsening symptoms  Colette Espinoza    Living Arrangements  Children; Spouse or Significiant other    Support System  Children; Spouse    The type of support provided  Emotional; Physical; Other (comment)    Do you have social support  Yes, as much as I need            During today's visit:    He notices his legs are swollen and fluid leaking bilaterally at " ankle. Very small ulceration noted bilaterally with clear fluid leaking. Recommended wound care with gauze, continue compression, will increase Lasix as above, referral to PT for lymphedema therapy.     He reports he is weaker, difficulty getting up from chair, feeling better but not 100% better, plan to start PT this week. Using walker at home. Using Xanax a couple of nights due to anxiety.     He reports hand shaking/tremor sometimes.     Diarrhea since discharged from hospital, finished with antibiotic, finished prednisone. Advise to watch, try yogurt given recent antibiotic use.     EKG in office showed Sinus rhythm with PVC, nonspecific ST/T wave changes. Will confirm with cardiology.     They are are complying with their medication changes and discharge instructions.     They have the following questions: As above     Review of Systems   All other systems reviewed and are negative.      Historical Information     The patient history was reviewed as follows:    Past Medical History:   Diagnosis Date    Abnormal ECG 2021 OR 2022    Alcoholism ([facility]) 1984    sober 38 years    Arthritis 2008    beginning    Bilateral carotid artery stenosis     Callus     Cancer ([facility])     skin    Chronic diastolic heart failure ([facility])     Chronic ischemic heart disease     Chronic kidney disease     stage 3    Colon polyp     COPD (chronic obstructive pulmonary disease) ([facility])     Coronary artery disease     hx stents, MI, PCI    COVID 11/2021    COVID 12/01/2023    CPAP (continuous positive airway pressure) dependence     Disease of thyroid gland 2017    nodules    Emphysema of lung ([facility]) 1995    started    Hearing loss     History of transfusion 1995    Hyperlipidemia     Hypertension     Intracranial aneurysm 04/25/2023    Lung nodule 2023    x rays    MI (myocardial infarction) ([facility]) 1995    Myocardial infarction ([facility]) 1995    Pneumonia     Pneumothorax 02/20/2023    collapsed lung    Prostate cancer ([facility])     RSV (respiratory  syncytial virus infection) 10/2022    S/P carotid endarterectomy     Shortness of breath     O2 2 l/nc PRN    Sleep apnea     Sleep apnea, obstructive     Stented coronary artery      Past Surgical History:   Procedure Laterality Date    APPENDECTOMY      CARDIAC CATHETERIZATION  2021    left main with no significant disease, proximal LAD with 10% stenosis at the site of prior stent, left circumflex artery with minimal luminal irregularities mid RCA with 50% stenosis at site of prior stent and PL segment with distal disease supplied by collaterals from the distal circumflex with no significant CAD requiring revascularization at that time.    CATARACT EXTRACTION, BILATERAL Bilateral     COLONOSCOPY      CORONARY ANGIOPLASTY WITH STENT PLACEMENT      RCA    CORONARY ANGIOPLASTY WITH STENT PLACEMENT      RCA    CORONARY ANGIOPLASTY WITH STENT PLACEMENT      LAD    EYE SURGERY      WY BX PROSTATE STRTCTC SATURATION SAMPLING IMG GID N/A 2022    Procedure: TRANSPERINEAL MRI FUSION  BIOPSY PROSTATE;  Surgeon: Mele Patel MD;  Location: [facility];  Service: Urology    WY NEUROPLASTY &/TRANSPOS MEDIAN NRV CARPAL TUNNE Left 2022    Procedure: RELEASE CARPAL TUNNEL;  Surgeon: Matty Mcgowan MD;  Location: [facility];  Service: Orthopedics    WY NEUROPLASTY &/TRANSPOSITION ULNAR NERVE ELBOW Left 2022    Procedure: RELEASE CUBITAL TUNNEL;  Surgeon: Matty Mcgowan MD;  Location: [facility];  Service: Orthopedics    WY NEUROPLASTY &/TRANSPOSITION ULNAR NERVE WRIST Left 2022    Procedure: GUYON'S CANAL RELEASE;  Surgeon: Matty Mcgowan MD;  Location: [facility];  Service: Orthopedics    SKIN CANCER EXCISION      chin-per pt, basal cell  also right ankle    TONSILLECTOMY  no     Family History   Problem Relation Age of Onset    Heart attack Mother     Dementia Mother     Heart failure Mother          2006    Heart disease Mother         heart attacks (2)    No Known  Problems Father       Social History   Social History     Substance and Sexual Activity   Alcohol Use Not Currently     Social History     Substance and Sexual Activity   Drug Use Never     Social History     Tobacco Use   Smoking Status Former    Current packs/day: 0.00    Average packs/day: 2.0 packs/day for 35.5 years (71.0 ttl pk-yrs)    Types: Cigarettes    Start date: 1960    Quit date: 1995    Years since quittin.5   Smokeless Tobacco Never   Tobacco Comments    stopped smoking the day i had a heart attack       Medications:   Meds/Allergies     Current Outpatient Medications:     ALPRAZolam (XANAX) 0.25 mg tablet, take 1 tablet by mouth at bedtime if needed, Disp: 15 tablet, Rfl: 0    aspirin 81 mg chewable tablet, 81 mg daily at bedtime, Disp: , Rfl:     budesonide (PULMICORT) 0.5 mg/2 mL nebulizer solution, Take 2 mL (0.5 mg total) by nebulization 2 (two) times a day Rinse mouth after use., Disp: 180 mL, Rfl: 3    cholecalciferol (VITAMIN D3) 1,000 units tablet, Take 1,000 Units by mouth daily, Disp: , Rfl:     dextromethorphan-guaiFENesin (ROBITUSSIN DM)  mg/5 mL syrup, Take 5 mL by mouth 3 (three) times a day as needed for cough, Disp: 354 mL, Rfl: 0    Empagliflozin (Jardiance) 10 MG TABS tablet, Take 1 tablet (10 mg total) by mouth every morning, Disp: 90 tablet, Rfl: 5    famotidine (PEPCID) 20 mg tablet, TAKE 1 TABLET BY MOUTH DAILY AT  BEDTIME, Disp: 100 tablet, Rfl: 2    fluticasone (FLONASE) 50 mcg/act nasal spray, 1 spray into each nostril 2 (two) times a day, Disp: 9.9 mL, Rfl: 0    formoterol (PERFOROMIST) 20 MCG/2ML nebulizer solution, Take 2 mL (20 mcg total) by nebulization 2 (two) times a day, Disp: 180 mL, Rfl: 0    furosemide (LASIX) 40 mg tablet, Take 1 tablet (40 mg total) by mouth 2 (two) times a day, Disp: , Rfl:     gabapentin (NEURONTIN) 300 mg capsule, Take 1 capsule (300 mg total) by mouth daily at bedtime, Disp: 90 capsule, Rfl: 0    Humidifier MISC, Use  continuous, Disp: 1 each, Rfl: 0    HYDROcodone-acetaminophen (Norco) 5-325 mg per tablet, Take 1 tablet by mouth every 6 (six) hours as needed for pain Max Daily Amount: 4 tablets, Disp: 15 tablet, Rfl: 0    ipratropium-albuterol (DUO-NEB) 0.5-2.5 mg/3 mL nebulizer solution, , Disp: , Rfl:     methocarbamol (ROBAXIN) 500 mg tablet, Take 1 tablet (500 mg total) by mouth 3 (three) times a day as needed for muscle spasms, Disp: 30 tablet, Rfl: 0    metoprolol succinate (TOPROL-XL) 25 mg 24 hr tablet, Take 0.5 tablets (12.5 mg total) by mouth 2 (two) times a day, Disp: 30 tablet, Rfl: 0    midodrine (PROAMATINE) 2.5 mg tablet, Take 1 tablet (2.5 mg total) by mouth 3 (three) times a day before meals Hold for SBP > 110mmHg, Disp: 90 tablet, Rfl: 6    naloxone (NARCAN) 4 mg/0.1 mL nasal spray, Administer 1 spray into a nostril. If no response after 2-3 minutes, give another dose in the other nostril using a new spray., Disp: 1 each, Rfl: 1    nystatin (MYCOSTATIN) 500,000 units/5 mL suspension, 5 ml orally (swish and spit/swallow) every four hours while awake, Disp: 180 mL, Rfl: 0    omeprazole (PriLOSEC) 20 mg delayed release capsule, take 1 capsule by mouth once daily, Disp: 30 capsule, Rfl: 3    oxygen gas, Inhale 2 L/min continuous 2LPM at rest and 3-4 LPM with activity per D Cedric FANG notes, Disp: , Rfl:     potassium chloride 10% oral solution, Take 30 mL (40 mEq total) by mouth daily, Disp: , Rfl:     predniSONE 10 mg tablet, Take 4 tablets (40 mg total) by mouth daily for 3 days, THEN 3 tablets (30 mg total) daily for 3 days, THEN 2 tablets (20 mg total) daily for 3 days, THEN 1 tablet (10 mg total) daily for 3 days, THEN 0.5 tablets (5 mg total) daily for 3 days., Disp: 32 tablet, Rfl: 0    rosuvastatin (CRESTOR) 40 MG tablet, TAKE 1 TABLET DAILY (Patient taking differently: Take 40 mg by mouth daily), Disp: 100 tablet, Rfl: 3    sodium chloride (OCEAN) 0.65 % nasal spray, 1 spray into each nostril every hour as  "needed for congestion, Disp: 30 mL, Rfl: 0  Allergies   Allergen Reactions    Lisinopril Swelling and Cough    Tetanus Antitoxin Anaphylaxis    Tetanus Toxoid Anaphylaxis and Swelling       OBJECTIVE    Vitals:   Vitals:    01/29/24 1122   BP: 120/70   Pulse: 56   Resp: 16   Temp: (!) 97.1 °F (36.2 °C)   TempSrc: Temporal   SpO2: 96%   Weight: 76.4 kg (168 lb 6.4 oz)   Height: 5' 8\" (1.727 m)       Body mass index is 25.61 kg/m².    Physical Exam:    Physical Exam  Vitals reviewed.   Constitutional:       General: He is not in acute distress.     Appearance: Normal appearance. He is not ill-appearing, toxic-appearing or diaphoretic.   HENT:      Head: Normocephalic and atraumatic.   Eyes:      General:         Right eye: No discharge.         Left eye: No discharge.      Extraocular Movements: Extraocular movements intact.      Conjunctiva/sclera: Conjunctivae normal.   Cardiovascular:      Rate and Rhythm: Normal rate. Rhythm irregular.      Heart sounds: Normal heart sounds. No murmur heard.     No friction rub. No gallop.   Pulmonary:      Effort: Pulmonary effort is normal. No respiratory distress.      Breath sounds: No stridor. Wheezing present. No rhonchi.      Comments: Wheezing, scattered crackles   Musculoskeletal:         General: No swelling, tenderness or signs of injury.      Right lower leg: Edema present.      Left lower leg: Edema present.      Comments: 2+ pitting edema with small ulceration inside both ankles, clear fluid   Skin:     General: Skin is warm.      Coloration: Skin is not pale.      Findings: No erythema or rash.   Neurological:      Mental Status: He is alert and oriented to person, place, and time.      Motor: No weakness.   Psychiatric:         Mood and Affect: Mood normal.         Behavior: Behavior normal.          Labs:  I have personally reviewed all pertinent results.     Lab Requisition on 01/26/2024   Component Date Value Ref Range Status    Sodium 01/26/2024 144  135 - 147 " mmol/L Final    Potassium 01/26/2024 3.7  3.5 - 5.3 mmol/L Final    Chloride 01/26/2024 98  96 - 108 mmol/L Final    CO2 01/26/2024 35 (H)  21 - 32 mmol/L Final    ANION GAP 01/26/2024 11  mmol/L Final    BUN 01/26/2024 21  5 - 25 mg/dL Final    Creatinine 01/26/2024 1.15  0.60 - 1.30 mg/dL Final    Standardized to IDMS reference method    Glucose 01/26/2024 81  65 - 140 mg/dL Final    If the patient is fasting, the ADA then defines impaired fasting glucose as > 100 mg/dL and diabetes as > or equal to 123 mg/dL.    Calcium 01/26/2024 9.0  8.4 - 10.2 mg/dL Final    eGFR 01/26/2024 60  ml/min/1.73sq m Final       Imaging:  I have personally reviewed all pertinent results.    Assessment/Plan    No problem-specific Assessment & Plan notes found for this encounter.    Shlomo was seen today for follow-up.    Diagnoses and all orders for this visit:    Aspiration pneumonia of right lower lobe, unspecified aspiration pneumonia type (HCC)  -     Comprehensive metabolic panel; Future  -     CBC and differential; Future  -     Magnesium; Future  -     XR chest pa & lateral; Future    Chronic respiratory failure with hypoxia (HCC)  -     Comprehensive metabolic panel; Future  -     CBC and differential; Future  -     Magnesium; Future  -     XR chest pa & lateral; Future    Chronic systolic congestive heart failure (HCC)  -     XR chest pa & lateral; Future    Hypertensive heart and chronic kidney disease with heart failure and stage 1 through stage 4 chronic kidney disease, or unspecified chronic kidney disease (HCC)  -     POCT ECG    Lymphedema  -     Ambulatory Referral to Physical Therapy; Future    Chronic ulcer of right leg, limited to breakdown of skin (HCC)    Skin ulcer of left lower leg, limited to breakdown of skin (HCC)    Dysphagia, unspecified type    COPD, severe (HCC)    Chronic ischemic heart disease  -     furosemide (LASIX) 40 mg tablet; Take 1 tablet (40 mg total) by mouth 2 (two) times a day    Physical  deconditioning    Ambulatory dysfunction    Intermittent diarrhea          Future Appointments   Date Time Provider Department Center   1/31/2024  1:00 PM Huyen Jenkins, PT [facility]   2/1/2024 To Be Determined Vera Haji RN [facility]   2/7/2024 To Be Determined Vera Haji RN [facility]   2/20/2024 11:00 AM  [facility]   3/4/2024  4:00 PM Sia Lowe PA-C [facility]   3/6/2024  2:00 PM Kyleigh Villar DPM POD [facility]   4/12/2024  1:40 PM Liz Lopez DO PULM [facility]   4/12/2024  3:00 PM Rubio Sykes DO BM Cardio [facility]   7/11/2024  1:40 PM Mignon Bear PA-C CTR [facility]          Sarahy Clifford DO  [facility]

## 2024-01-30 ENCOUNTER — PATIENT OUTREACH (OUTPATIENT)
Dept: CASE MANAGEMENT | Facility: OTHER | Age: 79
End: 2024-01-30

## 2024-01-30 VITALS
RESPIRATION RATE: 20 BRPM | HEART RATE: 72 BPM | TEMPERATURE: 97.3 F | OXYGEN SATURATION: 89 % | DIASTOLIC BLOOD PRESSURE: 50 MMHG | SYSTOLIC BLOOD PRESSURE: 120 MMHG

## 2024-01-30 RX ORDER — FUROSEMIDE 40 MG/1
40 TABLET ORAL 2 TIMES DAILY
Start: 2024-01-30

## 2024-01-30 NOTE — PROGRESS NOTES
.OP RT CM called and spoke with patient regarding their breathing, medications and O2.  Shlomo is on 3.5lpm nasal cannula use SpO2: 88-89% . I spoke with daughter Nedra extensively regarding palliative care. She inquired regarding his chronic diseases and processes. She is wondering the future of his disease state regarding his COPD/CHF.     Per nedra, her dad, Shlomo was on palliative care in the past and would like more information and supportive care, to help him feel more comfortable. She is not interested in hospice care at this time, however she sees his decline and wants him to be more comfortable while supporting his needs and goals. I will in basket message PCP regarding palliative outreach.     Shlomo is taking respiratory medications as prescribed. Nedra is appreciative of outreach and has my contact information. I will outreach in two weeks.

## 2024-01-31 ENCOUNTER — HOME CARE VISIT (OUTPATIENT)
Dept: HOME HEALTH SERVICES | Facility: HOME HEALTHCARE | Age: 79
End: 2024-01-31
Payer: COMMERCIAL

## 2024-01-31 VITALS — HEART RATE: 72 BPM | OXYGEN SATURATION: 74 % | DIASTOLIC BLOOD PRESSURE: 58 MMHG | SYSTOLIC BLOOD PRESSURE: 104 MMHG

## 2024-01-31 PROCEDURE — G0151 HHCP-SERV OF PT,EA 15 MIN: HCPCS

## 2024-01-31 NOTE — CASE COMMUNICATION
Pt discharged from  PT this visit.  pt indepdendent with mobility without AD, but does utilize RW as needed.   Education provided for energy conservation techniques.

## 2024-01-31 NOTE — CASE COMMUNICATION
Pt continues with fluctuating SpO2 levels when resting.. pt fluctuated between 80% and 92% while resting this visit while on 3L of oxygen.  During mobility, pt to 74% this visit, but self reports at times levels drops to 60% range.

## 2024-02-01 ENCOUNTER — LAB REQUISITION (OUTPATIENT)
Dept: LAB | Facility: HOSPITAL | Age: 79
End: 2024-02-01
Payer: COMMERCIAL

## 2024-02-01 ENCOUNTER — TELEPHONE (OUTPATIENT)
Dept: FAMILY MEDICINE CLINIC | Facility: CLINIC | Age: 79
End: 2024-02-01

## 2024-02-01 ENCOUNTER — HOME CARE VISIT (OUTPATIENT)
Dept: HOME HEALTH SERVICES | Facility: HOME HEALTHCARE | Age: 79
End: 2024-02-01
Payer: COMMERCIAL

## 2024-02-01 DIAGNOSIS — J96.11 CHRONIC RESPIRATORY FAILURE WITH HYPOXIA (HCC): ICD-10-CM

## 2024-02-01 LAB
ALBUMIN SERPL BCP-MCNC: 3.8 G/DL (ref 3.5–5)
ALP SERPL-CCNC: 72 U/L (ref 34–104)
ALT SERPL W P-5'-P-CCNC: 23 U/L (ref 7–52)
ANION GAP SERPL CALCULATED.3IONS-SCNC: 8 MMOL/L
AST SERPL W P-5'-P-CCNC: 23 U/L (ref 13–39)
BASOPHILS # BLD AUTO: 0.04 THOUSANDS/ÂΜL (ref 0–0.1)
BASOPHILS NFR BLD AUTO: 0 % (ref 0–1)
BILIRUB SERPL-MCNC: 1.22 MG/DL (ref 0.2–1)
BUN SERPL-MCNC: 15 MG/DL (ref 5–25)
CALCIUM SERPL-MCNC: 9.1 MG/DL (ref 8.4–10.2)
CHLORIDE SERPL-SCNC: 96 MMOL/L (ref 96–108)
CO2 SERPL-SCNC: 38 MMOL/L (ref 21–32)
CREAT SERPL-MCNC: 1.34 MG/DL (ref 0.6–1.3)
EOSINOPHIL # BLD AUTO: 0.07 THOUSAND/ÂΜL (ref 0–0.61)
EOSINOPHIL NFR BLD AUTO: 1 % (ref 0–6)
ERYTHROCYTE [DISTWIDTH] IN BLOOD BY AUTOMATED COUNT: 16.7 % (ref 11.6–15.1)
GFR SERPL CREATININE-BSD FRML MDRD: 50 ML/MIN/1.73SQ M
GLUCOSE SERPL-MCNC: 115 MG/DL (ref 65–140)
HCT VFR BLD AUTO: 46.8 % (ref 36.5–49.3)
HGB BLD-MCNC: 14.3 G/DL (ref 12–17)
IMM GRANULOCYTES # BLD AUTO: 0.04 THOUSAND/UL (ref 0–0.2)
IMM GRANULOCYTES NFR BLD AUTO: 0 % (ref 0–2)
LYMPHOCYTES # BLD AUTO: 0.71 THOUSANDS/ÂΜL (ref 0.6–4.47)
LYMPHOCYTES NFR BLD AUTO: 7 % (ref 14–44)
MAGNESIUM SERPL-MCNC: 2 MG/DL (ref 1.9–2.7)
MCH RBC QN AUTO: 31.8 PG (ref 26.8–34.3)
MCHC RBC AUTO-ENTMCNC: 30.6 G/DL (ref 31.4–37.4)
MCV RBC AUTO: 104 FL (ref 82–98)
MONOCYTES # BLD AUTO: 1 THOUSAND/ÂΜL (ref 0.17–1.22)
MONOCYTES NFR BLD AUTO: 9 % (ref 4–12)
NEUTROPHILS # BLD AUTO: 8.96 THOUSANDS/ÂΜL (ref 1.85–7.62)
NEUTS SEG NFR BLD AUTO: 83 % (ref 43–75)
NRBC BLD AUTO-RTO: 0 /100 WBCS
PLATELET # BLD AUTO: 174 THOUSANDS/UL (ref 149–390)
PMV BLD AUTO: 11.1 FL (ref 8.9–12.7)
POTASSIUM SERPL-SCNC: 3.8 MMOL/L (ref 3.5–5.3)
PROT SERPL-MCNC: 5.6 G/DL (ref 6.4–8.4)
RBC # BLD AUTO: 4.5 MILLION/UL (ref 3.88–5.62)
SODIUM SERPL-SCNC: 142 MMOL/L (ref 135–147)
WBC # BLD AUTO: 10.82 THOUSAND/UL (ref 4.31–10.16)

## 2024-02-01 PROCEDURE — 85025 COMPLETE CBC W/AUTO DIFF WBC: CPT | Performed by: FAMILY MEDICINE

## 2024-02-01 PROCEDURE — 80053 COMPREHEN METABOLIC PANEL: CPT | Performed by: FAMILY MEDICINE

## 2024-02-01 PROCEDURE — G0299 HHS/HOSPICE OF RN EA 15 MIN: HCPCS

## 2024-02-01 PROCEDURE — 83735 ASSAY OF MAGNESIUM: CPT | Performed by: FAMILY MEDICINE

## 2024-02-01 NOTE — TELEPHONE ENCOUNTER
Received this message from home PT- Pt continues with fluctuating SpO2 levels when resting.. pt fluctuated between 80% and 92% while resting this visit while on 3L of oxygen.  During mobility, pt to 74% this visit, but self reports at times levels drops to 60% range.     Please ask patient about oxygen levels, if dropping that low needs to go back to hospital.

## 2024-02-02 ENCOUNTER — TELEPHONE (OUTPATIENT)
Dept: CARDIOLOGY CLINIC | Facility: CLINIC | Age: 79
End: 2024-02-02

## 2024-02-02 NOTE — TELEPHONE ENCOUNTER
He wants to know what the ratio of water he uses to mix with the liquid potassium.  He said its not stated anywhere.

## 2024-02-02 NOTE — TELEPHONE ENCOUNTER
Harini also states patient has been taking Xanax to help him sleep at night. Reports his mood swings are increasing as well. Wondering if the script can be upped or if he can take BID.

## 2024-02-02 NOTE — TELEPHONE ENCOUNTER
I was able to call and speak with patient's daughter about request for how much liquid to mix with potassium.  After discussion with clinical pharmacy team patient can mix up to 4 ounces of liquid to dilute potassium.  Patient's daughter noted understanding and was agreeable.  Patient's daughter does note improvement in blood pressure with midodrine usage and did have his diuretics uptitrated and has had some improvement but still having issues with hypoxia.  She notes that patient does not want to go to the hospital for some of these issues and his oxygen does recover.  I informed her that if patient were to have significant issue she should have him seek emergency medical care but she should also reach out as well to his pulmonologist to see if there is any additional treatment options.  She noted understanding was agreeable.  I did also discuss with her that if her father's wishes are to not undergo significant evaluation or treatment then could consider palliative option at this time she will be discussing with family.

## 2024-02-02 NOTE — TELEPHONE ENCOUNTER
Called and spoke with Harini. She did confirm the O2 readings from last night. States Shlomo is going to refuse to go back to the hospital, and she would need to have good reason to be taking him. States he sits for a few minutes and it comes back up. Harini states if it is low and stays low, she would definitely take him back to the ED.     Harini states she is doing everything she can to keep an eye on him and promises if anything gets worse she will take him right to the hospital.

## 2024-02-05 NOTE — TELEPHONE ENCOUNTER
I would rather discuss options with them in office, Xanax can have a lot of side effects especially with his breathing issues. Please schedule appointment to discuss.

## 2024-02-06 ENCOUNTER — TELEPHONE (OUTPATIENT)
Dept: FAMILY MEDICINE CLINIC | Facility: CLINIC | Age: 79
End: 2024-02-06

## 2024-02-06 VITALS
RESPIRATION RATE: 22 BRPM | HEART RATE: 68 BPM | DIASTOLIC BLOOD PRESSURE: 54 MMHG | OXYGEN SATURATION: 93 % | TEMPERATURE: 97.2 F | SYSTOLIC BLOOD PRESSURE: 104 MMHG

## 2024-02-06 NOTE — TELEPHONE ENCOUNTER
We can watch for cancellations but unfortunately I am full, if he is injured I really would recommend ED for urgent eval of his injuries.

## 2024-02-06 NOTE — TELEPHONE ENCOUNTER
Nedra notified. Verbalized understanding. Not taking him at this time, but if anything worsens, he will go

## 2024-02-06 NOTE — TELEPHONE ENCOUNTER
Calling for an appt with Dr Clifford they want to up his xanax and his hand slipped with his walker and he hit his side and ribs hurt , he doesn't want to go to Er because he is afraid they will keep him

## 2024-02-07 ENCOUNTER — HOME CARE VISIT (OUTPATIENT)
Dept: HOME HEALTH SERVICES | Facility: HOME HEALTHCARE | Age: 79
End: 2024-02-07
Payer: COMMERCIAL

## 2024-02-07 PROCEDURE — G0299 HHS/HOSPICE OF RN EA 15 MIN: HCPCS

## 2024-02-08 ENCOUNTER — OFFICE VISIT (OUTPATIENT)
Dept: URGENT CARE | Facility: CLINIC | Age: 79
End: 2024-02-08
Payer: COMMERCIAL

## 2024-02-08 ENCOUNTER — APPOINTMENT (EMERGENCY)
Dept: CT IMAGING | Facility: HOSPITAL | Age: 79
End: 2024-02-08
Payer: COMMERCIAL

## 2024-02-08 ENCOUNTER — HOSPITAL ENCOUNTER (EMERGENCY)
Facility: HOSPITAL | Age: 79
Discharge: HOME | End: 2024-02-08
Attending: EMERGENCY MEDICINE
Payer: COMMERCIAL

## 2024-02-08 VITALS
HEART RATE: 53 BPM | SYSTOLIC BLOOD PRESSURE: 107 MMHG | DIASTOLIC BLOOD PRESSURE: 58 MMHG | RESPIRATION RATE: 17 BRPM | OXYGEN SATURATION: 90 %

## 2024-02-08 VITALS
HEART RATE: 83 BPM | OXYGEN SATURATION: 90 % | SYSTOLIC BLOOD PRESSURE: 100 MMHG | RESPIRATION RATE: 18 BRPM | TEMPERATURE: 97.5 F | DIASTOLIC BLOOD PRESSURE: 72 MMHG

## 2024-02-08 DIAGNOSIS — R07.81 RIB PAIN ON LEFT SIDE: Primary | ICD-10-CM

## 2024-02-08 DIAGNOSIS — R10.32 LEFT LOWER QUADRANT ABDOMINAL PAIN: ICD-10-CM
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
## 2024-02-08 DIAGNOSIS — S20.212A RIB CONTUSION, LEFT, INITIAL ENCOUNTER: Primary | ICD-10-CM

## 2024-02-08 LAB
ALBUMIN SERPL BCP-MCNC: 3.6 G/DL (ref 3.5–5)
ALP SERPL-CCNC: 76 U/L (ref 34–104)
ALT SERPL W P-5'-P-CCNC: 25 U/L (ref 7–52)
ANION GAP SERPL CALCULATED.3IONS-SCNC: 7 MMOL/L
AST SERPL W P-5'-P-CCNC: 29 U/L (ref 13–39)
BASOPHILS # BLD AUTO: 0.03 THOUSANDS/ÂΜL (ref 0–0.1)
BASOPHILS NFR BLD AUTO: 0 % (ref 0–1)
BILIRUB SERPL-MCNC: 1.05 MG/DL (ref 0.2–1)
BUN SERPL-MCNC: 15 MG/DL (ref 5–25)
CALCIUM SERPL-MCNC: 9.1 MG/DL (ref 8.4–10.2)
CHLORIDE SERPL-SCNC: 96 MMOL/L (ref 96–108)
CO2 SERPL-SCNC: 39 MMOL/L (ref 21–32)
CREAT SERPL-MCNC: 1.45 MG/DL (ref 0.6–1.3)
EOSINOPHIL # BLD AUTO: 0.02 THOUSAND/ÂΜL (ref 0–0.61)
EOSINOPHIL NFR BLD AUTO: 0 % (ref 0–6)
ERYTHROCYTE [DISTWIDTH] IN BLOOD BY AUTOMATED COUNT: 16.4 % (ref 11.6–15.1)
GFR SERPL CREATININE-BSD FRML MDRD: 45 ML/MIN/1.73SQ M
GLUCOSE SERPL-MCNC: 68 MG/DL (ref 65–140)
HCT VFR BLD AUTO: 44.5 % (ref 36.5–49.3)
HGB BLD-MCNC: 13.8 G/DL (ref 12–17)
IMM GRANULOCYTES # BLD AUTO: 0.02 THOUSAND/UL (ref 0–0.2)
IMM GRANULOCYTES NFR BLD AUTO: 0 % (ref 0–2)
LYMPHOCYTES # BLD AUTO: 0.58 THOUSANDS/ÂΜL (ref 0.6–4.47)
LYMPHOCYTES NFR BLD AUTO: 7 % (ref 14–44)
MCH RBC QN AUTO: 32.2 PG (ref 26.8–34.3)
MCHC RBC AUTO-ENTMCNC: 31 G/DL (ref 31.4–37.4)
MCV RBC AUTO: 104 FL (ref 82–98)
MONOCYTES # BLD AUTO: 0.84 THOUSAND/ÂΜL (ref 0.17–1.22)
MONOCYTES NFR BLD AUTO: 10 % (ref 4–12)
NEUTROPHILS # BLD AUTO: 6.75 THOUSANDS/ÂΜL (ref 1.85–7.62)
NEUTS SEG NFR BLD AUTO: 83 % (ref 43–75)
NRBC BLD AUTO-RTO: 0 /100 WBCS
PLATELET # BLD AUTO: 138 THOUSANDS/UL (ref 149–390)
PMV BLD AUTO: 10.4 FL (ref 8.9–12.7)
POTASSIUM SERPL-SCNC: 3 MMOL/L (ref 3.5–5.3)
PROT SERPL-MCNC: 5.6 G/DL (ref 6.4–8.4)
RBC # BLD AUTO: 4.29 MILLION/UL (ref 3.88–5.62)
SODIUM SERPL-SCNC: 142 MMOL/L (ref 135–147)
WBC # BLD AUTO: 8.24 THOUSAND/UL (ref 4.31–10.16)

## 2024-02-08 PROCEDURE — 80053 COMPREHEN METABOLIC PANEL: CPT | Performed by: EMERGENCY MEDICINE

## 2024-02-08 PROCEDURE — 99213 OFFICE O/P EST LOW 20 MIN: CPT | Performed by: PHYSICIAN ASSISTANT

## 2024-02-08 PROCEDURE — 72125 CT NECK SPINE W/O DYE: CPT

## 2024-02-08 PROCEDURE — 99285 EMERGENCY DEPT VISIT HI MDM: CPT

## 2024-02-08 PROCEDURE — 36415 COLL VENOUS BLD VENIPUNCTURE: CPT | Performed by: EMERGENCY MEDICINE

## 2024-02-08 PROCEDURE — 85025 COMPLETE CBC W/AUTO DIFF WBC: CPT | Performed by: EMERGENCY MEDICINE

## 2024-02-08 PROCEDURE — 71260 CT THORAX DX C+: CPT

## 2024-02-08 PROCEDURE — 70450 CT HEAD/BRAIN W/O DYE: CPT

## 2024-02-08 PROCEDURE — 74177 CT ABD & PELVIS W/CONTRAST: CPT

## 2024-02-08 PROCEDURE — 99285 EMERGENCY DEPT VISIT HI MDM: CPT | Performed by: EMERGENCY MEDICINE

## 2024-02-08 RX ORDER — POTASSIUM CHLORIDE 20 MEQ/1
40 TABLET, EXTENDED RELEASE ORAL ONCE
Status: COMPLETED | OUTPATIENT
Start: 2024-02-08 | End: 2024-02-08

## 2024-02-08 RX ADMIN — SODIUM CHLORIDE 250 ML: 0.9 INJECTION, SOLUTION INTRAVENOUS at 16:59

## 2024-02-08 RX ADMIN — POTASSIUM CHLORIDE 40 MEQ: 1500 TABLET, EXTENDED RELEASE ORAL at 17:53

## 2024-02-08 RX ADMIN — IOHEXOL 100 ML: 350 INJECTION, SOLUTION INTRAVENOUS at 16:11

## 2024-02-08 NOTE — PROGRESS NOTES
[facility]        NAME: Seun Alva is a 78 y.o. male  : 1945    MRN: [id number]  DATE: 2024  TIME: 2:34 PM    Assessment and Plan   Rib pain on left side [R07.81]  1. Rib pain on left side  Transfer to other facility      2. Left lower quadrant abdominal pain  Transfer to other facility      3. Fall, initial encounter  Transfer to other facility        ED for further evaluation to r/o acute abdominal or cardiopulmonary pathology.    Patient Instructions     Referred to the ED for further evaluation.    Chief Complaint     Chief Complaint   Patient presents with    Fall     Side pain fell 2 days          History of Present Illness       Patient is a 78 year old male presenting to [facility] with left side pain 2/2 fall that occurred two days ago.  Patient is reliant on 3L O2 at all times 2/2 COPD.  Patient took oxygen off to walk to the bathroom and became dizzy.  Pt fell into a chair and struck left side of ribs.  Pain to this site since that time.  Pt denies LOC or head strike.  Patient does take Aspirin 81mg daily.  Pt took one Oxycodone night that the fall occurred and otherwise did not take any medications.      Chest Pain   This is a new problem. The current episode started in the past 7 days. The onset quality is gradual. The problem occurs constantly. The problem has been gradually worsening. Associated symptoms include dizziness. Pertinent negatives include no abdominal pain, back pain, cough, fever, palpitations, shortness of breath or vomiting. Associated symptoms comments: Decreased appetite.   Pertinent negatives for past medical history include no seizures.       Review of Systems   Review of Systems   Constitutional:  Negative for chills and fever.   HENT:  Negative for ear pain and sore throat.    Eyes:  Negative for pain and visual disturbance.   Respiratory:  Negative for cough and shortness of breath.    Cardiovascular:  Positive for chest pain. Negative for  palpitations.   Gastrointestinal:  Negative for abdominal pain and vomiting.   Genitourinary:  Negative for dysuria and hematuria.   Musculoskeletal:  Positive for arthralgias (Left side pain). Negative for back pain.   Skin:  Negative for color change and rash.   Neurological:  Positive for dizziness. Negative for seizures and syncope.   All other systems reviewed and are negative.        Current Medications       Current Outpatient Medications:     ALPRAZolam (XANAX) 0.25 mg tablet, take 1 tablet by mouth at bedtime if needed, Disp: 15 tablet, Rfl: 0    aspirin 81 mg chewable tablet, 81 mg daily at bedtime, Disp: , Rfl:     budesonide (PULMICORT) 0.5 mg/2 mL nebulizer solution, Take 2 mL (0.5 mg total) by nebulization 2 (two) times a day Rinse mouth after use., Disp: 180 mL, Rfl: 3    cholecalciferol (VITAMIN D3) 1,000 units tablet, Take 1,000 Units by mouth daily, Disp: , Rfl:     dextromethorphan-guaiFENesin (ROBITUSSIN DM)  mg/5 mL syrup, Take 5 mL by mouth 3 (three) times a day as needed for cough, Disp: 354 mL, Rfl: 0    Empagliflozin (Jardiance) 10 MG TABS tablet, Take 1 tablet (10 mg total) by mouth every morning, Disp: 90 tablet, Rfl: 5    famotidine (PEPCID) 20 mg tablet, TAKE 1 TABLET BY MOUTH DAILY AT  BEDTIME, Disp: 100 tablet, Rfl: 2    fluticasone (FLONASE) 50 mcg/act nasal spray, 1 spray into each nostril 2 (two) times a day, Disp: 9.9 mL, Rfl: 0    formoterol (PERFOROMIST) 20 MCG/2ML nebulizer solution, Take 2 mL (20 mcg total) by nebulization 2 (two) times a day, Disp: 180 mL, Rfl: 0    furosemide (LASIX) 40 mg tablet, Take 1 tablet (40 mg total) by mouth 2 (two) times a day, Disp: , Rfl:     gabapentin (NEURONTIN) 300 mg capsule, Take 1 capsule (300 mg total) by mouth daily at bedtime, Disp: 90 capsule, Rfl: 0    Humidifier MISC, Use continuous, Disp: 1 each, Rfl: 0    HYDROcodone-acetaminophen (Norco) 5-325 mg per tablet, Take 1 tablet by mouth every 6 (six) hours as needed for pain Max  Daily Amount: 4 tablets, Disp: 15 tablet, Rfl: 0    ipratropium-albuterol (DUO-NEB) 0.5-2.5 mg/3 mL nebulizer solution, , Disp: , Rfl:     methocarbamol (ROBAXIN) 500 mg tablet, Take 1 tablet (500 mg total) by mouth 3 (three) times a day as needed for muscle spasms, Disp: 30 tablet, Rfl: 0    metoprolol succinate (TOPROL-XL) 25 mg 24 hr tablet, Take 0.5 tablets (12.5 mg total) by mouth 2 (two) times a day, Disp: 30 tablet, Rfl: 0    midodrine (PROAMATINE) 2.5 mg tablet, Take 1 tablet (2.5 mg total) by mouth 3 (three) times a day before meals Hold for SBP > 110mmHg, Disp: 90 tablet, Rfl: 6    naloxone (NARCAN) 4 mg/0.1 mL nasal spray, Administer 1 spray into a nostril. If no response after 2-3 minutes, give another dose in the other nostril using a new spray., Disp: 1 each, Rfl: 1    nystatin (MYCOSTATIN) 500,000 units/5 mL suspension, 5 ml orally (swish and spit/swallow) every four hours while awake, Disp: 180 mL, Rfl: 0    omeprazole (PriLOSEC) 20 mg delayed release capsule, take 1 capsule by mouth once daily, Disp: 30 capsule, Rfl: 3    oxygen gas, Inhale 2 L/min continuous 2LPM at rest and 3-4 LPM with activity per D Cedric FANG notes, Disp: , Rfl:     potassium chloride 10% oral solution, Take 30 mL (40 mEq total) by mouth daily, Disp: , Rfl:     rosuvastatin (CRESTOR) 40 MG tablet, TAKE 1 TABLET DAILY (Patient taking differently: Take 40 mg by mouth daily), Disp: 100 tablet, Rfl: 3    sodium chloride (OCEAN) 0.65 % nasal spray, 1 spray into each nostril every hour as needed for congestion, Disp: 30 mL, Rfl: 0    Current Allergies     Allergies as of 02/08/2024 - Reviewed 02/08/2024   Allergen Reaction Noted    Lisinopril Swelling and Cough 01/18/2008    Tetanus antitoxin Anaphylaxis 06/09/2005    Tetanus toxoid Anaphylaxis and Swelling 01/16/2007            The following portions of the patient's history were reviewed and updated as appropriate: allergies, current medications, past family history, past medical  history, past social history, past surgical history and problem list.     Past Medical History:   Diagnosis Date    Abnormal ECG 2021 OR 2022    Alcoholism ([facility]) 1984    sober 38 years    Arthritis 2008    beginning    Bilateral carotid artery stenosis     Callus     Cancer ([facility])     skin    Chronic diastolic heart failure ([facility])     Chronic ischemic heart disease     Chronic kidney disease     stage 3    Colon polyp     COPD (chronic obstructive pulmonary disease) ([facility])     Coronary artery disease     hx stents, MI, PCI    COVID 11/2021    COVID 12/01/2023    CPAP (continuous positive airway pressure) dependence     Disease of thyroid gland 2017    nodules    Emphysema of lung ([facility]) 1995    started    Hearing loss     History of transfusion 1995    Hyperlipidemia     Hypertension     Intracranial aneurysm 04/25/2023    Lung nodule 2023    x rays    MI (myocardial infarction) ([facility]) 1995    Myocardial infarction ([facility]) 1995    Pneumonia     Pneumothorax 02/20/2023    collapsed lung    Prostate cancer ([facility])     RSV (respiratory syncytial virus infection) 10/2022    S/P carotid endarterectomy     Shortness of breath     O2 2 l/nc PRN    Sleep apnea     Sleep apnea, obstructive     Stented coronary artery        Past Surgical History:   Procedure Laterality Date    APPENDECTOMY      CARDIAC CATHETERIZATION  03/18/2021    left main with no significant disease, proximal LAD with 10% stenosis at the site of prior stent, left circumflex artery with minimal luminal irregularities mid RCA with 50% stenosis at site of prior stent and PL segment with distal disease supplied by collaterals from the distal circumflex with no significant CAD requiring revascularization at that time.    CATARACT EXTRACTION, BILATERAL Bilateral     COLONOSCOPY      CORONARY ANGIOPLASTY WITH STENT PLACEMENT  1999    RCA    CORONARY ANGIOPLASTY WITH STENT PLACEMENT  2001    RCA    CORONARY ANGIOPLASTY WITH STENT PLACEMENT  2003    LAD    EYE SURGERY       UT BX PROSTATE STRTCTC SATURATION SAMPLING IMG GID N/A 2022    Procedure: TRANSPERINEAL MRI FUSION  BIOPSY PROSTATE;  Surgeon: Mele Patel MD;  Location: [facility];  Service: Urology    UT NEUROPLASTY &/TRANSPOS MEDIAN NRV CARPAL TUNNE Left 2022    Procedure: RELEASE CARPAL TUNNEL;  Surgeon: Matty Mcgowan MD;  Location: [facility];  Service: Orthopedics    UT NEUROPLASTY &/TRANSPOSITION ULNAR NERVE ELBOW Left 2022    Procedure: RELEASE CUBITAL TUNNEL;  Surgeon: Matty Mcgowan MD;  Location: [facility];  Service: Orthopedics    UT NEUROPLASTY &/TRANSPOSITION ULNAR NERVE WRIST Left 2022    Procedure: GUYON'S CANAL RELEASE;  Surgeon: Matty Mcgowan MD;  Location: [facility];  Service: Orthopedics    SKIN CANCER EXCISION      chin-per pt, basal cell  also right ankle    TONSILLECTOMY  no       Family History   Problem Relation Age of Onset    Heart attack Mother     Dementia Mother     Heart failure Mother          2006    Heart disease Mother         heart attacks (2)    No Known Problems Father          Medications have been verified.        Objective   /72   Pulse 83   Temp 97.5 °F (36.4 °C)   Resp 18   SpO2 90%   No LMP for male patient.       Physical Exam     Physical Exam  Constitutional:       Appearance: He is normal weight. He is ill-appearing.   HENT:      Head: Normocephalic and atraumatic.      Nose: Nose normal.      Mouth/Throat:      Mouth: Mucous membranes are moist.   Eyes:      Extraocular Movements: Extraocular movements intact.      Conjunctiva/sclera: Conjunctivae normal.      Pupils: Pupils are equal, round, and reactive to light.   Cardiovascular:      Rate and Rhythm: Normal rate and regular rhythm.   Pulmonary:      Effort: Pulmonary effort is normal.   Abdominal:      Tenderness: There is abdominal tenderness in the left upper quadrant and left lower quadrant. There is guarding.   Musculoskeletal:         General: Normal range of  motion.      Cervical back: Normal range of motion and neck supple.   Skin:     General: Skin is warm and dry.   Neurological:      General: No focal deficit present.      Mental Status: He is alert and oriented to person, place, and time.   Psychiatric:         Mood and Affect: Mood normal.         Behavior: Behavior normal.

## 2024-02-08 NOTE — ED PROVIDER NOTES
History  Chief Complaint   Patient presents with    Rib Injury     Left side, patient reports tripping and falling on left side 2 days ago       States he fell because he was feeling weak after not using his home oxygen.  This is a known thing that happens to him and has not worsened recently.  States he hit his left torso and has moderate persistent pain.  Went to urgent care with a centimeter for further evaluation.  States he does not think he hit his head or neck but he is on aspirin.  His pain is worse with palpation and movement.  Nothing making it feel better.  No radiation of pain.  Patient's son helps with history taking due to patient's advanced age and difficulty with hearing.  Son states he is otherwise at his baseline.        Prior to Admission Medications   Prescriptions Last Dose Informant Patient Reported? Taking?   ALPRAZolam (XANAX) 0.25 mg tablet   No No   Sig: take 1 tablet by mouth at bedtime if needed   Empagliflozin (Jardiance) 10 MG TABS tablet  Self, Spouse/Significant Other No No   Sig: Take 1 tablet (10 mg total) by mouth every morning   HYDROcodone-acetaminophen (Norco) 5-325 mg per tablet  Spouse/Significant Other, Self No No   Sig: Take 1 tablet by mouth every 6 (six) hours as needed for pain Max Daily Amount: 4 tablets   Humidifier MISC  Self, Spouse/Significant Other No No   Sig: Use continuous   aspirin 81 mg chewable tablet  Spouse/Significant Other, Self Yes No   Si mg daily at bedtime   budesonide (PULMICORT) 0.5 mg/2 mL nebulizer solution  Self, Spouse/Significant Other No No   Sig: Take 2 mL (0.5 mg total) by nebulization 2 (two) times a day Rinse mouth after use.   cholecalciferol (VITAMIN D3) 1,000 units tablet  Self, Spouse/Significant Other Yes No   Sig: Take 1,000 Units by mouth daily   dextromethorphan-guaiFENesin (ROBITUSSIN DM)  mg/5 mL syrup  Spouse/Significant Other, Self No No   Sig: Take 5 mL by mouth 3 (three) times a day as needed for cough   famotidine  (PEPCID) 20 mg tablet  Spouse/Significant Other, Self No No   Sig: TAKE 1 TABLET BY MOUTH DAILY AT  BEDTIME   fluticasone (FLONASE) 50 mcg/act nasal spray  Self, Spouse/Significant Other No No   Si spray into each nostril 2 (two) times a day   formoterol (PERFOROMIST) 20 MCG/2ML nebulizer solution  Self, Spouse/Significant Other No No   Sig: Take 2 mL (20 mcg total) by nebulization 2 (two) times a day   furosemide (LASIX) 40 mg tablet   No No   Sig: Take 1 tablet (40 mg total) by mouth 2 (two) times a day   gabapentin (NEURONTIN) 300 mg capsule  Self, Spouse/Significant Other No No   Sig: Take 1 capsule (300 mg total) by mouth daily at bedtime   ipratropium-albuterol (DUO-NEB) 0.5-2.5 mg/3 mL nebulizer solution  Self, Spouse/Significant Other Yes No   methocarbamol (ROBAXIN) 500 mg tablet  Spouse/Significant Other, Self No No   Sig: Take 1 tablet (500 mg total) by mouth 3 (three) times a day as needed for muscle spasms   metoprolol succinate (TOPROL-XL) 25 mg 24 hr tablet  Self, Spouse/Significant Other No No   Sig: Take 0.5 tablets (12.5 mg total) by mouth 2 (two) times a day   midodrine (PROAMATINE) 2.5 mg tablet   No No   Sig: Take 1 tablet (2.5 mg total) by mouth 3 (three) times a day before meals Hold for SBP > 110mmHg   naloxone (NARCAN) 4 mg/0.1 mL nasal spray  Spouse/Significant Other, Self No No   Sig: Administer 1 spray into a nostril. If no response after 2-3 minutes, give another dose in the other nostril using a new spray.   nystatin (MYCOSTATIN) 500,000 units/5 mL suspension   No No   Si ml orally (swish and spit/swallow) every four hours while awake   omeprazole (PriLOSEC) 20 mg delayed release capsule  Self, Spouse/Significant Other No No   Sig: take 1 capsule by mouth once daily   oxygen gas  Spouse/Significant Other, Self Yes No   Sig: Inhale 2 L/min continuous 2LPM at rest and 3-4 LPM with activity per D Cedric FANG notes   potassium chloride 10% oral solution   No No   Sig: Take 30 mL (40  mEq total) by mouth daily   rosuvastatin (CRESTOR) 40 MG tablet  Spouse/Significant Other, Self No No   Sig: TAKE 1 TABLET DAILY   Patient taking differently: Take 40 mg by mouth daily   sodium chloride (OCEAN) 0.65 % nasal spray  Self, Spouse/Significant Other No No   Si spray into each nostril every hour as needed for congestion      Facility-Administered Medications: None       Past Medical History:   Diagnosis Date    Abnormal ECG  OR     Alcoholism ([facility]) 1984    sober 38 years    Arthritis 2008    beginning    Bilateral carotid artery stenosis     Callus     Cancer ([facility])     skin    Chronic diastolic heart failure ([facility])     Chronic ischemic heart disease     Chronic kidney disease     stage 3    Colon polyp     COPD (chronic obstructive pulmonary disease) ([facility])     Coronary artery disease     hx stents, MI, PCI    COVID 2021    COVID 2023    CPAP (continuous positive airway pressure) dependence     Disease of thyroid gland 2017    nodules    Emphysema of lung ([facility])     started    Hearing loss     History of transfusion     Hyperlipidemia     Hypertension     Intracranial aneurysm 2023    Lung nodule     x rays    MI (myocardial infarction) ([facility])     Myocardial infarction ([facility])     Pneumonia     Pneumothorax 2023    collapsed lung    Prostate cancer ([facility])     RSV (respiratory syncytial virus infection) 10/2022    S/P carotid endarterectomy     Shortness of breath     O2 2 l/nc PRN    Sleep apnea     Sleep apnea, obstructive     Stented coronary artery        Past Surgical History:   Procedure Laterality Date    APPENDECTOMY      CARDIAC CATHETERIZATION  2021    left main with no significant disease, proximal LAD with 10% stenosis at the site of prior stent, left circumflex artery with minimal luminal irregularities mid RCA with 50% stenosis at site of prior stent and PL segment with distal disease supplied by collaterals from the distal circumflex with  no significant CAD requiring revascularization at that time.    CATARACT EXTRACTION, BILATERAL Bilateral     COLONOSCOPY      CORONARY ANGIOPLASTY WITH STENT PLACEMENT      RCA    CORONARY ANGIOPLASTY WITH STENT PLACEMENT      RCA    CORONARY ANGIOPLASTY WITH STENT PLACEMENT      LAD    EYE SURGERY      DC BX PROSTATE STRTCTC SATURATION SAMPLING IMG GID N/A 2022    Procedure: TRANSPERINEAL MRI FUSION  BIOPSY PROSTATE;  Surgeon: Mele Patel MD;  Location: [facility];  Service: Urology    DC NEUROPLASTY &/TRANSPOS MEDIAN NRV CARPAL TUNNE Left 2022    Procedure: RELEASE CARPAL TUNNEL;  Surgeon: Matty Mcgowan MD;  Location: [facility];  Service: Orthopedics    DC NEUROPLASTY &/TRANSPOSITION ULNAR NERVE ELBOW Left 2022    Procedure: RELEASE CUBITAL TUNNEL;  Surgeon: Matty Mcgowan MD;  Location: [facility];  Service: Orthopedics    DC NEUROPLASTY &/TRANSPOSITION ULNAR NERVE WRIST Left 2022    Procedure: GUYON'S CANAL RELEASE;  Surgeon: Matty Mcgowan MD;  Location: [facility];  Service: Orthopedics    SKIN CANCER EXCISION  2012    chin-per pt, basal cell  also right ankle    TONSILLECTOMY  no       Family History   Problem Relation Age of Onset    Heart attack Mother     Dementia Mother     Heart failure Mother          2006    Heart disease Mother         heart attacks (2)    No Known Problems Father      I have reviewed and agree with the history as documented.    E-Cigarette/Vaping    E-Cigarette Use Never User      E-Cigarette/Vaping Substances    Nicotine No     THC No     CBD No     Flavoring No     Other No     Unknown No      Social History     Tobacco Use    Smoking status: Former     Current packs/day: 0.00     Average packs/day: 2.0 packs/day for 35.5 years (71.0 ttl pk-yrs)     Types: Cigarettes     Start date: 1960     Quit date: 1995     Years since quittin.6    Smokeless tobacco: Never    Tobacco comments:     stopped smoking the day i had  a heart attack   Vaping Use    Vaping status: Never Used   Substance Use Topics    Alcohol use: Not Currently    Drug use: Never       Review of Systems   Constitutional:  Negative for chills and fever.   Eyes:  Negative for visual disturbance.   Respiratory:  Negative for shortness of breath.    Cardiovascular:  Negative for chest pain.   Gastrointestinal:  Negative for abdominal distention and abdominal pain.   Endocrine: Negative for polyuria.   Genitourinary:  Negative for decreased urine volume and dysuria.   Neurological:  Negative for dizziness, syncope and weakness.       Physical Exam  Physical Exam  Constitutional:       General: He is not in acute distress.     Appearance: He is well-developed. He is not ill-appearing, toxic-appearing or diaphoretic.   HENT:      Head: Normocephalic and atraumatic.   Eyes:      Extraocular Movements: Extraocular movements intact.      Conjunctiva/sclera: Conjunctivae normal.      Pupils: Pupils are equal, round, and reactive to light.   Cardiovascular:      Rate and Rhythm: Normal rate and regular rhythm.      Heart sounds: Normal heart sounds.   Pulmonary:      Effort: Pulmonary effort is normal. No respiratory distress.   Abdominal:      General: Bowel sounds are normal.      Palpations: Abdomen is soft.   Musculoskeletal:         General: Tenderness present. No deformity or signs of injury. Normal range of motion.      Cervical back: Normal range of motion and neck supple.   Skin:     General: Skin is warm and dry.      Findings: Bruising (extensive in multiple stages of healing) present.   Neurological:      General: No focal deficit present.      Mental Status: He is alert and oriented to person, place, and time.   Psychiatric:         Behavior: Behavior normal.         Thought Content: Thought content normal.         Judgment: Judgment normal.         Vital Signs  ED Triage Vitals [02/08/24 1455]   Temp Pulse Respirations Blood Pressure SpO2   -- (!) 53 17 107/58 90  %      Temp src Heart Rate Source Patient Position - Orthostatic VS BP Location FiO2 (%)   -- -- Sitting Left arm --      Pain Score       7           Vitals:    02/08/24 1455   BP: 107/58   Pulse: (!) 53   Patient Position - Orthostatic VS: Sitting         Visual Acuity      ED Medications  Medications   sodium chloride 0.9 % bolus 250 mL (0 mL Intravenous Stopped 2/8/24 1753)   iohexol (OMNIPAQUE) 350 MG/ML injection (MULTI-DOSE) 100 mL (100 mL Intravenous Given 2/8/24 1611)   potassium chloride (Klor-Con M20) CR tablet 40 mEq (40 mEq Oral Given 2/8/24 1753)       Diagnostic Studies  Results Reviewed       Procedure Component Value Units Date/Time    Comprehensive metabolic panel [[id number]]  (Abnormal) Collected: 02/08/24 1527    Lab Status: Final result Specimen: Blood from Arm, Left Updated: 02/08/24 1545     Sodium 142 mmol/L      Potassium 3.0 mmol/L      Chloride 96 mmol/L      CO2 39 mmol/L      ANION GAP 7 mmol/L      BUN 15 mg/dL      Creatinine 1.45 mg/dL      Glucose 68 mg/dL      Calcium 9.1 mg/dL      AST 29 U/L      ALT 25 U/L      Alkaline Phosphatase 76 U/L      Total Protein 5.6 g/dL      Albumin 3.6 g/dL      Total Bilirubin 1.05 mg/dL      eGFR 45 ml/min/1.73sq m     Narrative:      National Kidney Disease Foundation guidelines for Chronic Kidney Disease (CKD):     Stage 1 with normal or high GFR (GFR > 90 mL/min/1.73 square meters)    Stage 2 Mild CKD (GFR = 60-89 mL/min/1.73 square meters)    Stage 3A Moderate CKD (GFR = 45-59 mL/min/1.73 square meters)    Stage 3B Moderate CKD (GFR = 30-44 mL/min/1.73 square meters)    Stage 4 Severe CKD (GFR = 15-29 mL/min/1.73 square meters)    Stage 5 End Stage CKD (GFR <15 mL/min/1.73 square meters)  Note: GFR calculation is accurate only with a steady state creatinine    CBC and differential [[id number]]  (Abnormal) Collected: 02/08/24 1527    Lab Status: Final result Specimen: Blood from Arm, Left Updated: 02/08/24 1532     WBC 8.24 Thousand/uL      RBC  4.29 Million/uL      Hemoglobin 13.8 g/dL      Hematocrit 44.5 %       fL      MCH 32.2 pg      MCHC 31.0 g/dL      RDW 16.4 %      MPV 10.4 fL      Platelets 138 Thousands/uL      nRBC 0 /100 WBCs      Neutrophils Relative 83 %      Immat GRANS % 0 %      Lymphocytes Relative 7 %      Monocytes Relative 10 %      Eosinophils Relative 0 %      Basophils Relative 0 %      Neutrophils Absolute 6.75 Thousands/µL      Immature Grans Absolute 0.02 Thousand/uL      Lymphocytes Absolute 0.58 Thousands/µL      Monocytes Absolute 0.84 Thousand/µL      Eosinophils Absolute 0.02 Thousand/µL      Basophils Absolute 0.03 Thousands/µL                    CT chest abdomen pelvis w contrast   Final Result by Sharmila Triana MD (02/08 1708)   No acute intrathoracic or intra-abdominal pathology.   Small right pleural effusion, mildly increased since prior study.   Compressive atelectasis in the lower lobes, right greater than left.   Emphysema.   Scattered calcified granulomata.   No rib fractures or other acute fracture identified.   Chronic findings, as per the body of the report.               Workstation performed: [id number]         CT spine cervical without contrast   Final Result by Sharmila Triana MD (02/08 1655)      No cervical spine fracture or traumatic malalignment.   Multilevel degenerative changes                  Workstation performed: [id number]         CT head without contrast   Final Result by Sharmila Triana MD (02/08 1643)      No acute intracranial abnormality.   Chronic findings, as above.                  Workstation performed: [id number]                    Procedures  Procedures         ED Course                                             Medical Decision Making  Patient had a fall which had a clear attributable cause which was reversible.  He feels better now.  Patient has rib contusion but no identifiable fractures.  He appears clinically well.  CT demonstrates no acute findings.  Discussed chronic  findings and need for outpatient follow-up.  Discussed breathing in order to prevent atelectasis or pneumonia.  Discussed warning signs and symptoms with the patient as well as when to return to the emergency department versus follow up with [facility]. Patient states understanding and agreement with the plan.  Patient care delayed due to critical capacity in the emergency department.      This note was completed using dictation software.       Problems Addressed:  Fall, initial encounter: acute illness or injury  Rib contusion, left, initial encounter: acute illness or injury    Amount and/or Complexity of Data Reviewed  External Data Reviewed: notes.  Labs: ordered.  Radiology: ordered.    Risk  OTC drugs.  Prescription drug management.             Disposition  Final diagnoses:   Rib contusion, left, initial encounter   Fall, initial encounter     Time reflects when diagnosis was documented in both MDM as applicable and the Disposition within this note       Time User Action Codes Description Comment    2/8/2024  5:16 PM Bryce Gong [S20.212A] Rib contusion, left, initial encounter     2/8/2024  5:17 PM Bryce Gong [W19.XXXA] Fall, initial encounter           ED Disposition       ED Disposition   Discharge    Condition   Stable    Date/Time   Thu Feb 8, 2024  5:16 PM    Comment   Seun Alva discharge to home/self care.                   Follow-up Information       Follow up With Specialties Details Why Contact Yoanna Clifford, DO Family Medicine In 1 day  [address] [place] [address]  [address]  [facility] [id number] [phone]              Discharge Medication List as of 2/8/2024  5:17 PM        CONTINUE these medications which have NOT CHANGED    Details   ALPRAZolam (XANAX) 0.25 mg tablet take 1 tablet by mouth at bedtime if needed, Starting Mon 1/22/2024, Normal      aspirin 81 mg chewable tablet 81 mg daily at bedtime, Historical Med      budesonide (PULMICORT) 0.5 mg/2 mL nebulizer solution Take 2 mL  (0.5 mg total) by nebulization 2 (two) times a day Rinse mouth after use., Starting Fri 12/22/2023, Normal      cholecalciferol (VITAMIN D3) 1,000 units tablet Take 1,000 Units by mouth daily, Historical Med      dextromethorphan-guaiFENesin (ROBITUSSIN DM)  mg/5 mL syrup Take 5 mL by mouth 3 (three) times a day as needed for cough, Starting Mon 2/13/2023, Normal      Empagliflozin (Jardiance) 10 MG TABS tablet Take 1 tablet (10 mg total) by mouth every morning, Starting Wed 9/6/2023, Until Thu 2/27/2025, Normal      famotidine (PEPCID) 20 mg tablet TAKE 1 TABLET BY MOUTH DAILY AT  BEDTIME, Normal      fluticasone (FLONASE) 50 mcg/act nasal spray 1 spray into each nostril 2 (two) times a day, Starting Fri 12/8/2023, Normal      formoterol (PERFOROMIST) 20 MCG/2ML nebulizer solution Take 2 mL (20 mcg total) by nebulization 2 (two) times a day, Starting Tue 11/28/2023, Normal      furosemide (LASIX) 40 mg tablet Take 1 tablet (40 mg total) by mouth 2 (two) times a day, Starting Tue 1/30/2024, No Print      gabapentin (NEURONTIN) 300 mg capsule Take 1 capsule (300 mg total) by mouth daily at bedtime, Starting Tue 11/28/2023, Normal      Humidifier MISC Use continuous, Starting Tue 11/28/2023, Normal      HYDROcodone-acetaminophen (Norco) 5-325 mg per tablet Take 1 tablet by mouth every 6 (six) hours as needed for pain Max Daily Amount: 4 tablets, Starting Fri 6/30/2023, Normal      ipratropium-albuterol (DUO-NEB) 0.5-2.5 mg/3 mL nebulizer solution Starting Mon 8/14/2023, Historical Med      methocarbamol (ROBAXIN) 500 mg tablet Take 1 tablet (500 mg total) by mouth 3 (three) times a day as needed for muscle spasms, Starting Sun 7/23/2023, Normal      metoprolol succinate (TOPROL-XL) 25 mg 24 hr tablet Take 0.5 tablets (12.5 mg total) by mouth 2 (two) times a day, Starting Fri 10/27/2023, Normal      midodrine (PROAMATINE) 2.5 mg tablet Take 1 tablet (2.5 mg total) by mouth 3 (three) times a day before meals Hold  for SBP > 110mmHg, Starting Tue 1/23/2024, Normal      naloxone (NARCAN) 4 mg/0.1 mL nasal spray Administer 1 spray into a nostril. If no response after 2-3 minutes, give another dose in the other nostril using a new spray., Normal      nystatin (MYCOSTATIN) 500,000 units/5 mL suspension 5 ml orally (swish and spit/swallow) every four hours while awake, Normal      omeprazole (PriLOSEC) 20 mg delayed release capsule take 1 capsule by mouth once daily, Starting Mon 11/27/2023, Normal      oxygen gas Inhale 2 L/min continuous 2LPM at rest and 3-4 LPM with activity per D Cedric FANG notes, Historical Med      potassium chloride 10% oral solution Take 30 mL (40 mEq total) by mouth daily, Starting Mon 1/8/2024, No Print      rosuvastatin (CRESTOR) 40 MG tablet TAKE 1 TABLET DAILY, Normal      sodium chloride (OCEAN) 0.65 % nasal spray 1 spray into each nostril every hour as needed for congestion, Starting Fri 12/8/2023, Normal             No discharge procedures on file.    PDMP Review         Value Time User    PDMP Reviewed  Yes 1/22/2024  3:50 PM Sarahy Clifford DO            ED Provider  Electronically Signed by             Bryce Gong MD  02/08/24 6154

## 2024-02-12 ENCOUNTER — TELEPHONE (OUTPATIENT)
Age: 79
End: 2024-02-12

## 2024-02-12 VITALS
DIASTOLIC BLOOD PRESSURE: 56 MMHG | SYSTOLIC BLOOD PRESSURE: 108 MMHG | OXYGEN SATURATION: 95 % | TEMPERATURE: 97 F | HEART RATE: 68 BPM | RESPIRATION RATE: 20 BRPM

## 2024-02-12 NOTE — TELEPHONE ENCOUNTER
Pt's son in law calling. States pt had CT chest with contrast in ER on 2/8 due to a fall. Wondering if ok to cancel CT chest previously scheduled for today. Chest CT from 2/8 is in chart. Advised it is ok to skip imaging today. If further imaging is warranted, will make him aware.

## 2024-02-14 ENCOUNTER — PATIENT OUTREACH (OUTPATIENT)
Dept: CASE MANAGEMENT | Facility: OTHER | Age: 79
End: 2024-02-14
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
## 2024-02-19 ENCOUNTER — TELEPHONE (OUTPATIENT)
Dept: CARDIAC REHAB | Facility: HOSPITAL | Age: 79
End: 2024-02-19

## 2024-02-19 NOTE — TELEPHONE ENCOUNTER
Nedra returned [facility] staff's call. Shlomo is insisting on attending pulmonary rehab and will be at his evaluation tomorrow.

## 2024-02-19 NOTE — TELEPHONE ENCOUNTER
[facility] staff reached out to patient's daughter (primary contact) to discuss her father's pulmonary rehab initial evaluation scheduled for tomorrow, 2/20/2024. [facility] staff left a message asking Nedra to return call and discuss.

## 2024-02-20 ENCOUNTER — OFFICE VISIT (OUTPATIENT)
Dept: FAMILY MEDICINE CLINIC | Facility: CLINIC | Age: 79
End: 2024-02-20
Payer: COMMERCIAL

## 2024-02-20 ENCOUNTER — CLINICAL SUPPORT (OUTPATIENT)
Dept: PULMONOLOGY | Facility: HOSPITAL | Age: 79
End: 2024-02-20
Attending: INTERNAL MEDICINE
Payer: COMMERCIAL

## 2024-02-20 VITALS
HEIGHT: 68 IN | OXYGEN SATURATION: 95 % | WEIGHT: 152.2 LBS | SYSTOLIC BLOOD PRESSURE: 110 MMHG | DIASTOLIC BLOOD PRESSURE: 54 MMHG | TEMPERATURE: 97.2 F | BODY MASS INDEX: 23.07 KG/M2 | RESPIRATION RATE: 18 BRPM | HEART RATE: 58 BPM

## 2024-02-20 DIAGNOSIS — E87.6 HYPOKALEMIA: ICD-10-CM

## 2024-02-20 DIAGNOSIS — R29.898 RIGHT ARM WEAKNESS: ICD-10-CM

## 2024-02-20 DIAGNOSIS — M54.50 CHRONIC BILATERAL LOW BACK PAIN WITHOUT SCIATICA: ICD-10-CM

## 2024-02-20 DIAGNOSIS — J44.1 COPD EXACERBATION (HCC): ICD-10-CM

## 2024-02-20 DIAGNOSIS — J44.9 COPD, SEVERE (HCC): ICD-10-CM

## 2024-02-20 DIAGNOSIS — D69.6 PLATELETS DECREASED (HCC): ICD-10-CM

## 2024-02-20 DIAGNOSIS — R29.898 TRANSIENT WEAKNESS OF LOWER EXTREMITY: ICD-10-CM

## 2024-02-20 DIAGNOSIS — G89.29 CHRONIC BILATERAL LOW BACK PAIN WITHOUT SCIATICA: ICD-10-CM

## 2024-02-20 DIAGNOSIS — R29.898 RIGHT LEG WEAKNESS: ICD-10-CM

## 2024-02-20 DIAGNOSIS — R25.1 SHAKING: ICD-10-CM

## 2024-02-20 DIAGNOSIS — I95.9 HYPOTENSION, UNSPECIFIED HYPOTENSION TYPE: ICD-10-CM

## 2024-02-20 DIAGNOSIS — F33.9 DEPRESSION, RECURRENT (HCC): ICD-10-CM

## 2024-02-20 DIAGNOSIS — C61 PROSTATE CANCER (HCC): ICD-10-CM

## 2024-02-20 DIAGNOSIS — J44.9 CHRONIC OBSTRUCTIVE PULMONARY DISEASE, UNSPECIFIED COPD TYPE (HCC): Primary | ICD-10-CM

## 2024-02-20 DIAGNOSIS — R13.10 DYSPHAGIA, UNSPECIFIED TYPE: ICD-10-CM

## 2024-02-20 DIAGNOSIS — I50.22 CHRONIC SYSTOLIC CONGESTIVE HEART FAILURE (HCC): Primary | Chronic | ICD-10-CM

## 2024-02-20 DIAGNOSIS — F41.9 ANXIETY: ICD-10-CM

## 2024-02-20 PROCEDURE — 94625 PHY/QHP OP PULM RHB W/O MNTR: CPT

## 2024-02-20 PROCEDURE — 99215 OFFICE O/P EST HI 40 MIN: CPT | Performed by: FAMILY MEDICINE

## 2024-02-20 RX ORDER — LIDOCAINE 50 MG/G
1 PATCH TOPICAL DAILY
Qty: 30 PATCH | Refills: 5 | Status: SHIPPED | OUTPATIENT
Start: 2024-02-20

## 2024-02-20 RX ORDER — ALPRAZOLAM 0.25 MG/1
0.25 TABLET ORAL
Qty: 30 TABLET | Refills: 0 | Status: SHIPPED | OUTPATIENT
Start: 2024-02-20

## 2024-02-20 NOTE — PROGRESS NOTES
Pulmonary Rehabilitation Plan of Care   Initial Care Plan      Today's date: 2024   # of Exercise Sessions Completed: 1  Patient name: Seun Alva      : 1945  Age: 78 y.o.       MRN: [id number]  Referring Physician: Hyacinth Espana MD (Internal Med)  Pulmonologist: Liz Lopez DO  Provider: Olga  Clinician: Erika Sacks, MS    Dx:  COPD, very severe  Ratio 44%  Date of onset: 2024 (Last hospitalized exacerbation)      SUMMARY OF PROGRESS:  Today is Shlomo's initial evaluation to begin Pulmonary Rehab for the diagnosis of COPD. Patient does have a PFT on file, revealing an FEV1/FVC ratio of 44% and an FEV1 of 1.06 L (38%). This is suggestive of severeobstruction. Since their diagnosis, the patient has been experiencing increased dyspnea, increased sitting time, decreased physical activity, increased fatigue and increased reliance on supplemental O2.  They report dyspnea and fatigue when completing ADLs . The patient currently does not follow a formal exercise program at home.  Pt reports the following physical limitations: decreased strength, fatigue, CHACON.  Depression screening using the PHQ-9 interprets the patient's score of 6 5-9 = Mild Depression. Anxiety screening using the PORTILLO-7 interprets the patients score of 4  0-4  = Not anxious. When addressed, the patient admits to having depression/anxiety. Patient reports excellent social/emotional support from friends and family.  Information to begin using [employer] was provided as well as contact information for counseling through [facility].  PHQ-9 score will be reassessed in 30 days.The patient is a former smoker (quit 28 years ago). He has abstained since quitting.Patient admits to 100% medication compliance.      At rest, the patient rated dyspnea 3/10 with SpO2 87-96% on supplemental O2 3-4L/min via nasal canula. Resting HR 91 and /54 with appropriate response to exercise 98 and 128/64. Patient completed an  initial 6MWT, walking 300 ft on 4L/min. During recovery, HR 92 and /64.    Education on smoking cessation, oxygen use, breathing techniques, pulmonary anatomy, exercise for the pulmonary patient, healthy eating, stress, and relaxation will be provided.  Patient goals include: increase strength, improve CHACON, discontinue ambulation with walker.      Shlomo will attend 24 exercise sessions, 2-3x/wk for 12-18 weeks. His family will call to schedule when they get home and look at the calendar.       Medication compliance: Yes   Comments: Pt reports to be compliant with medications    Fall Risk: Moderate   Comments: Patient uses walking assist device (walker/cane/rollator) and Reports a fall in the past 6 months    Smoking: Former user    RPD at Rest: 3/10  RPD with Exercise:  5/10    Assessment of progression of lung disease and functional status:  CAT: 28/40  Shortness of breath questionnaire: 61/120      EXERCISE ASSESSMENT and PLAN    Current Exercise Program in Rehab:       Frequency: 2-3 days/week   Supplement with home exercise 2+ days/wk as tolerated        Minutes: 30-35         METS: 3              SpO2: >88%              RPD: 4-6                      HR: 20-30 bpm above rest   RPE: 4-5         Modalities: UBE, NuStep, Recumbent bike and Room walking      Exercise Progression 30 Day Goals :    Frequency: 2-3 days/week    Supplement with home exercise 2+ days/wk as tolerated       Minutes: 40-45         METS: 3-4              SpO2: >88%              RPD: 4-6                      HR: 20-30 bpm above rest   RPE: 4-5        Modalities: UBE, NuStep, Recumbent bike and Room walking     Strength training:  Will be added following 2-3 weeks of monitored exercise sessions   Modalities: Leg Press, Chest Press and Calf Raises    Oxygen needs:   Rest:  supplemental O2 via nasal cannula @ 2-4L/min   Exercise/physical activity:  supplemental O2 via nasal cannula @ 3-4L/min   Sleep:   supplemental O2 via nasal cannula @  2.5L/min  and CPAP/BiPap    Oxygen Goal: Maintain SpO2>88% during exercise    Home Exercise: none  Education: pursed lipped breathing, diaphragmatic breathing, fall prevention while using nasal canula tubing, relaxation breathing, home exercise guidelines, benefits of exercise for pulmonary disease, RPE scale and RPD scale    Goals: reduced score on  USCD Shortness of Breath Questionnaire, Improved 6MW distance by 10%, reduced dyspnea during exercise , improved exercise tolerance (max METs tolerated in pulmonary rehab), SpO2 >88% during exercise, reduced dependence on supplemental O2, reduced score on CAT, reduced number of COPD exacerbations and attend pulmonary rehab regularly  Progressing:  Reviewed Pt goals and determined plan of care, Will continue to educate and progress as tolerated.    Plan: Titrate supplemental oxygen as needed to maintain SpO2>88% with exercise, learn to conserve energy with ADLs , practice diaphragmatic breathing and reduce time sitting at home    Readiness to change: Preparation:  (Getting ready to change)       NUTRITION ASSESSMENT AND PLAN    Weight control:    Starting weight: 182   Current weight: 152  Patient has had unplanned weight loss. Patient states he must wear his wife's clothing because all of his is too big.     Diabetes: N/A  A1c: 6.5    last measured: 9/2/2023    Lipid Panel: 9/2/2023  Tot: 159, Tri: 171, HDL: 73, LDL: 52    Goals:Increase water intake to reduce/thin mucus production Reduced SOB while eating Smaller more frequent meals Improve hydration  Education: heart healthy eating  low sodium diet  hydration  nutrition for  lipid management  Progressing:Reviewed Pt goals and determined plan of care, Will continue to educate and progress as tolerated.  Plan: Education class: Reading Food Labels and Education Class: Heart Healthy Eating  Readiness to change: Preparation:  (Getting ready to change)       PSYCHOSOCIAL ASSESSMENT AND PLAN    Emotional:  Depression  assessment:  PHQ-9 = 6 5-9 = Mild Depression            Anxiety measure:  PORTILLO-7 = 4 0-4  = Not anxious  Self-reported stress level: 5   Social support: Very Good    Goals:  Reduce perceived stress to 1-3/10, improved [facility] QOL < 27, PHQ-9 - reduced severity by one level, Increased interest in doing things, improved sleep, Improved appetite and improved concentration  Education: signs/sxs of depression, benefits of a positive support system, stress management techniques, depression and CAD and benefits of mental health counseling    Progressing:Reviewed Pt goals and determined plan of care, Will continue to educate and progress as tolerated.  Plan: Class: Stress and Your Health and Class: Relaxation  Readiness to change: Preparation:  (Getting ready to change)       OTHER CORE COMPONENTS     Tobacco:   Social History     Tobacco Use   Smoking Status Former    Current packs/day: 0.00    Average packs/day: 2.0 packs/day for 35.5 years (71.0 ttl pk-yrs)    Types: Cigarettes    Start date: 1960    Quit date: 1995    Years since quittin.6   Smokeless Tobacco Never   Tobacco Comments    stopped smoking the day i had a heart attack       Tobacco Use Intervention: Referral to tobacco expert:   N/A: Pt has a remote history of smoking    Blood pressure:    Restin/54   Exercise: 128/64   Recovery: TBD    Goals: consistent BP < 130/80, reduced dietary sodium <2300mg, moderate intensity exercise >150 mins/wk and medication compliance  Education:  pathophysiology of pulmonary disease, preventing infections, environmental triggers, nebulizer use, bronchodilators, bronchial hygiene, traveling with pulmonary disease, class: Pulmonary Anatomy and Physiology and class:  Pulmonary Medications  Progressing:Reviewed Pt goals and determined plan of care, Will continue to educate and progress as tolerated.  Plan: Class: Understanding Heart Disease and Class: Common Heart Medications  Readiness to change: Preparation:   "(Getting ready to change)       PULMONARY REHAB ASSESSMENT    Today's date: 2024  Patient name: Seun Alva     : 1945       MRN: [id number]  PCP: Sarahy Clifford DO  Referring Physician: Hyacinth Espana MD  Pulmonologist: Liz Lopez DO    Dx: COPD, severe  Ratio 44%  Date of onset: 2024 (Last hospitalized exacerbation)  Cultural needs: None    Weight:    Wt Readings from Last 1 Encounters:   24 76.4 kg (168 lb 6.4 oz)      Height:   Ht Readings from Last 1 Encounters:   24 5' 8\" (1.727 m)     Medical History:   Past Medical History:   Diagnosis Date    Abnormal ECG  OR     Alcoholism ([facility]) 1984    sober 38 years    Arthritis 2008    beginning    Bilateral carotid artery stenosis     Callus     Cancer ([facility])     skin    Chronic diastolic heart failure ([facility])     Chronic ischemic heart disease     Chronic kidney disease     stage 3    Colon polyp     COPD (chronic obstructive pulmonary disease) ([facility])     Coronary artery disease     hx stents, MI, PCI    COVID 2021    COVID 2023    CPAP (continuous positive airway pressure) dependence     Disease of thyroid gland     nodules    Emphysema of lung ([facility])     started    Hearing loss     History of transfusion     Hyperlipidemia     Hypertension     Intracranial aneurysm 2023    Lung nodule     x rays    MI (myocardial infarction) ([facility])     Myocardial infarction ([facility])     Pneumonia     Pneumothorax 2023    collapsed lung    Prostate cancer ([facility])     RSV (respiratory syncytial virus infection) 10/2022    S/P carotid endarterectomy     Shortness of breath     O2 2 l/nc PRN    Sleep apnea     Sleep apnea, obstructive     Stented coronary artery          Physical Limitations: Decreased strength, fatigue, SOB    Fall Risk: Low   Comments: Ambulates with a steady gait with no assist device    Oxygen needs:   Rest:  supplemental O2 via nasal cannula @ 2-4L/min "   Exercise/physical activity:  supplemental O2 via nasal cannula @ 3-4L/min   Sleep:   supplemental O2 via nasal cannula @ 2.5L/min  and CPAP/BiPap    Patient has Rx for supplemental O2:  yes    Rating of Perceived Dyspnea at rest:  1/10    Does Pt monitor home SpO2? yes   Average SpO2 at rest:  92-94%   Average SpO2 with ADLs/physical activity:  84-86%    History of Toxic Exposure:  Chemicals  Dust  Second hand smoke    Use of Rescue Inhaler:  Pt states he rarely uses his rescue inhaler    Use of Nebulizer Treatments:  Yes:  6 times per day    Patient practices breathing techniques at home:  Not always, encouraged to use spirometer      Risk Factors   Cholesterol: Yes  Smoking: Former user  HTN: Yes  DM: No  Obesity: Yes   Inactivity: Yes  Stress:  perceived  stress: 5/10   Stressors: Current health condition   Goals for Stress Management: Practice Relaxation Techniques, Read and Improve Time Management    Family History:  Family History   Problem Relation Age of Onset    Heart attack Mother     Dementia Mother     Heart failure Mother          2006    Heart disease Mother         heart attacks (2)    No Known Problems Father        Allergies: Lisinopril, Tetanus antitoxin, and Tetanus toxoid  ETOH:   Social History     Substance and Sexual Activity   Alcohol Use Not Currently         Current Medications:   Current Outpatient Medications   Medication Sig Dispense Refill    ALPRAZolam (XANAX) 0.25 mg tablet Take 1 tablet (0.25 mg total) by mouth daily at bedtime as needed for anxiety 15 tablet 0    aspirin 81 mg chewable tablet 81 mg daily at bedtime      budesonide (PULMICORT) 0.5 mg/2 mL nebulizer solution Take 2 mL (0.5 mg total) by nebulization 2 (two) times a day Rinse mouth after use. 180 mL 3    cholecalciferol (VITAMIN D3) 1,000 units tablet Take 1,000 Units by mouth daily      dextromethorphan-guaiFENesin (ROBITUSSIN DM)  mg/5 mL syrup Take 5 mL by mouth 3 (three) times a day as needed for cough  354 mL 0    Empagliflozin (Jardiance) 10 MG TABS tablet Take 1 tablet (10 mg total) by mouth every morning 90 tablet 5    famotidine (PEPCID) 20 mg tablet Take 1 tablet (20 mg total) by mouth daily at bedtime 90 tablet 0    fluticasone (FLONASE) 50 mcg/act nasal spray 1 spray into each nostril 2 (two) times a day 9.9 mL 0    formoterol (PERFOROMIST) 20 MCG/2ML nebulizer solution Take 2 mL (20 mcg total) by nebulization 2 (two) times a day 180 mL 0    furosemide (LASIX) 40 mg tablet Take 1 tablet (40 mg total) by mouth 2 (two) times a day      gabapentin (NEURONTIN) 300 mg capsule Take 1 capsule (300 mg total) by mouth daily at bedtime 90 capsule 0    Humidifier MISC Use continuous 1 each 0    HYDROcodone-acetaminophen (Norco) 5-325 mg per tablet Take 1 tablet by mouth every 6 (six) hours as needed for pain Max Daily Amount: 4 tablets 15 tablet 0    ipratropium-albuterol (DUO-NEB) 0.5-2.5 mg/3 mL nebulizer solution       methocarbamol (ROBAXIN) 500 mg tablet Take 1 tablet (500 mg total) by mouth 3 (three) times a day as needed for muscle spasms 30 tablet 0    metoprolol succinate (TOPROL-XL) 25 mg 24 hr tablet Take 0.5 tablets (12.5 mg total) by mouth 2 (two) times a day 30 tablet 0    midodrine (PROAMATINE) 2.5 mg tablet Take 1 tablet (2.5 mg total) by mouth 3 (three) times a day before meals Hold for SBP > 110mmHg 90 tablet 6    naloxone (NARCAN) 4 mg/0.1 mL nasal spray Administer 1 spray into a nostril. If no response after 2-3 minutes, give another dose in the other nostril using a new spray. 1 each 1    nystatin (MYCOSTATIN) 500,000 units/5 mL suspension 5 ml orally (swish and spit/swallow) every four hours while awake 180 mL 0    omeprazole (PriLOSEC) 20 mg delayed release capsule take 1 capsule by mouth once daily 30 capsule 3    oxygen gas Inhale 2 L/min continuous 2LPM at rest and 3-4 LPM with activity per D Cedric FANG notes      potassium chloride 10% oral solution Take 30 mL (40 mEq total) by mouth daily       rosuvastatin (CRESTOR) 40 MG tablet TAKE 1 TABLET DAILY (Patient taking differently: Take 40 mg by mouth daily) 100 tablet 3    sodium chloride (OCEAN) 0.65 % nasal spray 1 spray into each nostril every hour as needed for congestion 30 mL 0     No current facility-administered medications for this visit.           Functional Status Prior to Diagnosis for Treatment   Occupation: retired  Recreation: Spend time with family  ADL’s: No limitations  [place]: No limitations  Exercise: None  Other: None    Current Functional Status  Occupation: retired  Recreation: Spend time with family  ADL’s:Capable of performing light ADLs only  [place]: Capable of performing light ADLs only  Exercise: None  Other: None    Patient Specific Goals:  Increase strength, improve SOB/CHACON, reduce reliance on walker    Short Term Program Goals: dietary modifications increased strength improved energy/stamina with ADLs exercise 120-150 mins/wk    Long Term Goals: increased maximal walking duration  Improved functional capacity based on initial fitness assessment  improved exercise tolerance  Improved Quality of Life - [facility] score reduced  decreased shortness of breath  reduced reliance on supplemental O2    Oxygen Goals: Maintain SpO2>88% titrating supplemental oxygen as needed     Ability to reach goals/rehabilitation potential:  Very Good     Projected return to function: 12 weeks  Objective tests: 6 MWT       Nutritional   Reviewed details of Rate your Plate. Discussed key elements of heart healthy eating. Reviewed patient goals for dietary modifications and their clinical implications.  Reviewed most recent lipid profile.     Goals for dietary modification: choose lean cuts of meat  eliminate processed meats  more meatless meals  increase whole grains  eliminate butter  low sodium  more nuts/seeds  heathier choices while dining out      Emotional/Social  Patient has a history of depression    SOCIAL SUPPORT  NETWORK    Marital status:       Domestic Violence Screening: No    Comments: Patient requires skilled AR to achieve goals and maximum functional capacity. AR medically necessary to reduce exacerbations.

## 2024-02-20 NOTE — PROGRESS NOTES
Assessment/Plan:       Problem List Items Addressed This Visit          Digestive    Dysphagia (Chronic)       Respiratory    COPD, severe (HCC)    Relevant Orders    XR chest pa & lateral (Completed)       Cardiovascular and Mediastinum    Chronic systolic congestive heart failure (HCC) - Primary (Chronic)       Genitourinary    Prostate cancer (HCC)       Other    Depression, recurrent (HCC)    Relevant Medications    ALPRAZolam (XANAX) 0.25 mg tablet    Chronic bilateral low back pain without sciatica    Relevant Medications    lidocaine (Lidoderm) 5 %    Other Relevant Orders    US kidney and bladder    XR spine lumbar minimum 4 views non injury (Completed)    UA w Reflex to Microscopic w Reflex to Culture (Completed)    Platelets decreased (HCC)    Anxiety    Relevant Medications    ALPRAZolam (XANAX) 0.25 mg tablet     Other Visit Diagnoses       Hypotension, unspecified hypotension type        Shaking        Relevant Orders    MRI brain wo contrast    Right arm weakness        Relevant Orders    MRI brain wo contrast    Right leg weakness        Relevant Orders    MRI brain wo contrast    Transient weakness of lower extremity        Relevant Orders    MRI brain wo contrast    Hypokalemia        Relevant Orders    CBC and differential (Completed)    Comprehensive metabolic panel (Completed)              Continued follow-up with cardiology.   Recommended supplementation with Boost or Ensure to make sure getting adequate protein.   Continue Xanax PRN, we discussed SSRI he will consider.   Recommended lidocaine topically for back pain, lumbar XR and US kidney bladder. UA.   Discussed shaking/tremor likely secondary to hypoxemia, advised to always use home O2 at prescribed settings, follow-up pulmonology.  Recommended MRI brain given transient weakness episodes, concern for TIA/CVA. Asymptomatic currently.    Blood work ordered.     I have spent a total time of 45 minutes on 02/20/24 in caring for this patient  "including Diagnostic results, Risks and benefits of tx options, Instructions for management, Patient and family education, Importance of tx compliance, Risk factor reductions, Impressions, Counseling / Coordination of care, Documenting in the medical record, Reviewing / ordering tests, medicine, procedures  , and Obtaining or reviewing history  .      Subjective:      Patient ID: Senu Alva is a 78 y.o. male.    HPI    Blood pressure has been on lower side, following with cardiology for this, /40-50 at home, couple times lightheadedness. On midodrine.     Tough time eating with dysphagia restrictions, food taste is not there, chewing often, frustrated with this as well as hearing loss chronically.     Anxiety- Frustrated, not depressed but more frustrated, taking Xanax at bedtime which helps but feels he could benefit from dose PRN during the day.     Back pain- Lower back especially with position change, severe pain wakes him up at night. Feels it in kidney area, concerned about kidneys.     COPD- Shaking always a problem, oxygen level at home does sometimes go below 80, sometimes in 82-84.     He and his daughter are concerned about periods where he loses strength especially on his right side. Has happened a couple times now.     The following portions of the patient's history were reviewed and updated as appropriate: allergies, current medications, past family history, past medical history, past social history, past surgical history, and problem list.    Review of Systems   All other systems reviewed and are negative.        Objective:      /54   Pulse 58   Temp (!) 97.2 °F (36.2 °C) (Temporal)   Resp 18   Ht 5' 8\" (1.727 m)   Wt 69 kg (152 lb 3.2 oz)   SpO2 95%   BMI 23.14 kg/m²          Physical Exam  Vitals reviewed.   Constitutional:       General: He is not in acute distress.     Appearance: Normal appearance. He is not ill-appearing, toxic-appearing or diaphoretic.   HENT:      Head: " Normocephalic and atraumatic.   Eyes:      General:         Right eye: No discharge.         Left eye: No discharge.      Extraocular Movements: Extraocular movements intact.      Conjunctiva/sclera: Conjunctivae normal.   Cardiovascular:      Rate and Rhythm: Normal rate and regular rhythm.      Heart sounds: Normal heart sounds. No murmur heard.     No friction rub. No gallop.      Comments: Distant heart sounds   Pulmonary:      Effort: Pulmonary effort is normal. No respiratory distress.      Breath sounds: No stridor. No wheezing or rhonchi.      Comments: Diminished breath sounds throughout   Musculoskeletal:         General: No swelling, tenderness or signs of injury.      Right lower leg: No edema.      Left lower leg: No edema.   Skin:     General: Skin is warm.      Coloration: Skin is not pale.      Findings: No erythema or rash.   Neurological:      Mental Status: He is alert and oriented to person, place, and time.      Motor: No weakness.   Psychiatric:         Mood and Affect: Mood normal.         Behavior: Behavior normal.             Sarahy Clifford DO  [facility]

## 2024-02-21 ENCOUNTER — APPOINTMENT (OUTPATIENT)
Dept: LAB | Facility: HOSPITAL | Age: 79
End: 2024-02-21
Payer: COMMERCIAL

## 2024-02-21 ENCOUNTER — HOSPITAL ENCOUNTER (OUTPATIENT)
Dept: RADIOLOGY | Facility: HOSPITAL | Age: 79
Discharge: HOME | End: 2024-02-21
Payer: COMMERCIAL

## 2024-02-21 DIAGNOSIS — M54.50 CHRONIC BILATERAL LOW BACK PAIN WITHOUT SCIATICA: ICD-10-CM

## 2024-02-21 DIAGNOSIS — G89.29 CHRONIC BILATERAL LOW BACK PAIN WITHOUT SCIATICA: ICD-10-CM

## 2024-02-21 DIAGNOSIS — J44.9 COPD, SEVERE (HCC): ICD-10-CM

## 2024-02-21 DIAGNOSIS — E87.6 HYPOKALEMIA: ICD-10-CM

## 2024-02-21 PROBLEM — J18.9 RIGHT LOWER LOBE PNEUMONIA: Status: RESOLVED | Noted: 2023-04-03 | Resolved: 2024-02-21

## 2024-02-21 LAB
ALBUMIN SERPL BCP-MCNC: 3.9 G/DL (ref 3.5–5)
ALP SERPL-CCNC: 98 U/L (ref 34–104)
ALT SERPL W P-5'-P-CCNC: 18 U/L (ref 7–52)
ANION GAP SERPL CALCULATED.3IONS-SCNC: 10 MMOL/L
AST SERPL W P-5'-P-CCNC: 26 U/L (ref 13–39)
BASOPHILS # BLD AUTO: 0.06 THOUSANDS/ÂΜL (ref 0–0.1)
BASOPHILS NFR BLD AUTO: 1 % (ref 0–1)
BILIRUB SERPL-MCNC: 0.97 MG/DL (ref 0.2–1)
BUN SERPL-MCNC: 17 MG/DL (ref 5–25)
CALCIUM SERPL-MCNC: 9.5 MG/DL (ref 8.4–10.2)
CHLORIDE SERPL-SCNC: 91 MMOL/L (ref 96–108)
CO2 SERPL-SCNC: 35 MMOL/L (ref 21–32)
CREAT SERPL-MCNC: 1.65 MG/DL (ref 0.6–1.3)
EOSINOPHIL # BLD AUTO: 0.02 THOUSAND/ÂΜL (ref 0–0.61)
EOSINOPHIL NFR BLD AUTO: 0 % (ref 0–6)
ERYTHROCYTE [DISTWIDTH] IN BLOOD BY AUTOMATED COUNT: 15.9 % (ref 11.6–15.1)
GFR SERPL CREATININE-BSD FRML MDRD: 38 ML/MIN/1.73SQ M
GLUCOSE SERPL-MCNC: 166 MG/DL (ref 65–140)
HCT VFR BLD AUTO: 46.5 % (ref 36.5–49.3)
HGB BLD-MCNC: 14.3 G/DL (ref 12–17)
IMM GRANULOCYTES # BLD AUTO: 0.03 THOUSAND/UL (ref 0–0.2)
IMM GRANULOCYTES NFR BLD AUTO: 1 % (ref 0–2)
LYMPHOCYTES # BLD AUTO: 0.74 THOUSANDS/ÂΜL (ref 0.6–4.47)
LYMPHOCYTES NFR BLD AUTO: 12 % (ref 14–44)
MCH RBC QN AUTO: 31.6 PG (ref 26.8–34.3)
MCHC RBC AUTO-ENTMCNC: 30.8 G/DL (ref 31.4–37.4)
MCV RBC AUTO: 103 FL (ref 82–98)
MONOCYTES # BLD AUTO: 0.75 THOUSAND/ÂΜL (ref 0.17–1.22)
MONOCYTES NFR BLD AUTO: 12 % (ref 4–12)
NEUTROPHILS # BLD AUTO: 4.79 THOUSANDS/ÂΜL (ref 1.85–7.62)
NEUTS SEG NFR BLD AUTO: 74 % (ref 43–75)
NRBC BLD AUTO-RTO: 0 /100 WBCS
PLATELET # BLD AUTO: 285 THOUSANDS/UL (ref 149–390)
PMV BLD AUTO: 9.9 FL (ref 8.9–12.7)
POTASSIUM SERPL-SCNC: 3.1 MMOL/L (ref 3.5–5.3)
PROT SERPL-MCNC: 6.2 G/DL (ref 6.4–8.4)
RBC # BLD AUTO: 4.52 MILLION/UL (ref 3.88–5.62)
SODIUM SERPL-SCNC: 136 MMOL/L (ref 135–147)
WBC # BLD AUTO: 6.39 THOUSAND/UL (ref 4.31–10.16)

## 2024-02-21 PROCEDURE — 72110 X-RAY EXAM L-2 SPINE 4/>VWS: CPT

## 2024-02-21 PROCEDURE — 36415 COLL VENOUS BLD VENIPUNCTURE: CPT

## 2024-02-21 PROCEDURE — 85025 COMPLETE CBC W/AUTO DIFF WBC: CPT

## 2024-02-21 PROCEDURE — 71046 X-RAY EXAM CHEST 2 VIEWS: CPT

## 2024-02-21 PROCEDURE — 80053 COMPREHEN METABOLIC PANEL: CPT

## 2024-02-22 ENCOUNTER — APPOINTMENT (OUTPATIENT)
Dept: LAB | Facility: HOSPITAL | Age: 79
End: 2024-02-22
Payer: COMMERCIAL

## 2024-02-22 DIAGNOSIS — B37.0 ORAL CANDIDIASIS: ICD-10-CM

## 2024-02-22 DIAGNOSIS — J44.1 COPD EXACERBATION (HCC): ICD-10-CM

## 2024-02-22 LAB
BACTERIA UR QL AUTO: NORMAL /HPF
BILIRUB UR QL STRIP: NEGATIVE
CLARITY UR: CLEAR
COLOR UR: YELLOW
FINE GRAN CASTS URNS QL MICRO: NORMAL /LPF
GLUCOSE UR STRIP-MCNC: ABNORMAL MG/DL
HGB UR QL STRIP.AUTO: ABNORMAL
KETONES UR STRIP-MCNC: NEGATIVE MG/DL
LEUKOCYTE ESTERASE UR QL STRIP: NEGATIVE
NITRITE UR QL STRIP: NEGATIVE
NON-SQ EPI CELLS URNS QL MICRO: NORMAL /HPF
PH UR STRIP.AUTO: 6 [PH]
PROT UR STRIP-MCNC: ABNORMAL MG/DL
RBC #/AREA URNS AUTO: NORMAL /HPF
SP GR UR STRIP.AUTO: 1.01
UROBILINOGEN UR QL STRIP.AUTO: 0.2 E.U./DL
WBC #/AREA URNS AUTO: NORMAL /HPF

## 2024-02-22 PROCEDURE — 81001 URINALYSIS AUTO W/SCOPE: CPT

## 2024-02-22 PROCEDURE — 81003 URINALYSIS AUTO W/O SCOPE: CPT

## 2024-02-22 RX ORDER — FLUTICASONE PROPIONATE 50 MCG
1 SPRAY, SUSPENSION (ML) NASAL 2 TIMES DAILY
Qty: 9.9 ML | Refills: 5 | Status: SHIPPED | OUTPATIENT
Start: 2024-02-22

## 2024-02-22 RX ORDER — ECHINACEA PURPUREA EXTRACT 125 MG
1 TABLET ORAL
Qty: 30 ML | Refills: 5 | Status: SHIPPED | OUTPATIENT
Start: 2024-02-22

## 2024-02-23 ENCOUNTER — PATIENT MESSAGE (OUTPATIENT)
Dept: FAMILY MEDICINE CLINIC | Facility: CLINIC | Age: 79
End: 2024-02-23

## 2024-02-23 DIAGNOSIS — E87.6 HYPOKALEMIA: Primary | ICD-10-CM

## 2024-02-23 DIAGNOSIS — I25.9 CHRONIC ISCHEMIC HEART DISEASE: ICD-10-CM

## 2024-02-24 RX ORDER — FUROSEMIDE 40 MG/1
40 TABLET ORAL DAILY
Start: 2024-02-24

## 2024-02-26 ENCOUNTER — APPOINTMENT (OUTPATIENT)
Dept: LAB | Facility: HOSPITAL | Age: 79
End: 2024-02-26
Payer: COMMERCIAL

## 2024-02-26 DIAGNOSIS — E87.6 HYPOKALEMIA: ICD-10-CM

## 2024-02-26 LAB
ANION GAP SERPL CALCULATED.3IONS-SCNC: 7 MMOL/L
BUN SERPL-MCNC: 16 MG/DL (ref 5–25)
CALCIUM SERPL-MCNC: 9.2 MG/DL (ref 8.4–10.2)
CHLORIDE SERPL-SCNC: 97 MMOL/L (ref 96–108)
CO2 SERPL-SCNC: 34 MMOL/L (ref 21–32)
CREAT SERPL-MCNC: 1.76 MG/DL (ref 0.6–1.3)
GFR SERPL CREATININE-BSD FRML MDRD: 35 ML/MIN/1.73SQ M
GLUCOSE SERPL-MCNC: 60 MG/DL (ref 65–140)
POTASSIUM SERPL-SCNC: 3.8 MMOL/L (ref 3.5–5.3)
SODIUM SERPL-SCNC: 138 MMOL/L (ref 135–147)

## 2024-02-26 PROCEDURE — 80048 BASIC METABOLIC PNL TOTAL CA: CPT

## 2024-02-26 PROCEDURE — 36415 COLL VENOUS BLD VENIPUNCTURE: CPT

## 2024-02-28 ENCOUNTER — PATIENT OUTREACH (OUTPATIENT)
Dept: CASE MANAGEMENT | Facility: OTHER | Age: 79
End: 2024-02-28

## 2024-02-28 NOTE — PROGRESS NOTES
A call was placed to Shlomo's daughter Nedra to check on his breathing status, review an upcoming Radiology appt and discuss Pulmonary Rehab.    Nedra mentioned she believes he is breathing without difficulty at this time and is taking all medication as directed. He plans to attend the Radiology appt and he is contemplating attending the NC sessions. His initial appt was on 2/20/24 and no additional appts have been scheduled.     Nedra continues to provide support for her parents. She noticed a decline in her father's health as he is aging. Shlomo has lost most of his hearing, and Nedra remains the best contact. She agrees to another outreach in several weeks.

## 2024-02-29 ENCOUNTER — HOSPITAL ENCOUNTER (OUTPATIENT)
Dept: ULTRASOUND IMAGING | Facility: HOSPITAL | Age: 79
End: 2024-02-29
Payer: COMMERCIAL

## 2024-02-29 ENCOUNTER — HOSPITAL ENCOUNTER (OUTPATIENT)
Dept: MRI IMAGING | Facility: HOSPITAL | Age: 79
End: 2024-02-29
Payer: COMMERCIAL

## 2024-02-29 DIAGNOSIS — M54.50 CHRONIC BILATERAL LOW BACK PAIN WITHOUT SCIATICA: ICD-10-CM

## 2024-02-29 DIAGNOSIS — R25.1 SHAKING: ICD-10-CM

## 2024-02-29 DIAGNOSIS — G89.29 CHRONIC BILATERAL LOW BACK PAIN WITHOUT SCIATICA: ICD-10-CM

## 2024-02-29 DIAGNOSIS — R29.898 TRANSIENT WEAKNESS OF LOWER EXTREMITY: ICD-10-CM

## 2024-02-29 DIAGNOSIS — R29.898 RIGHT ARM WEAKNESS: ICD-10-CM

## 2024-02-29 DIAGNOSIS — R29.898 RIGHT LEG WEAKNESS: ICD-10-CM

## 2024-02-29 PROBLEM — F41.9 ANXIETY: Status: ACTIVE | Noted: 2024-02-29

## 2024-02-29 PROCEDURE — 76775 US EXAM ABDO BACK WALL LIM: CPT

## 2024-02-29 PROCEDURE — G1004 CDSM NDSC: HCPCS

## 2024-02-29 PROCEDURE — 70551 MRI BRAIN STEM W/O DYE: CPT

## 2024-03-04 ENCOUNTER — HOSPITAL ENCOUNTER (EMERGENCY)
Facility: HOSPITAL | Age: 79
Discharge: HOME | End: 2024-03-04
Attending: STUDENT IN AN ORGANIZED HEALTH CARE EDUCATION/TRAINING PROGRAM
Payer: COMMERCIAL

## 2024-03-04 ENCOUNTER — OFFICE VISIT (OUTPATIENT)
Dept: GASTROENTEROLOGY | Facility: CLINIC | Age: 79
End: 2024-03-04
Payer: COMMERCIAL

## 2024-03-04 ENCOUNTER — APPOINTMENT (EMERGENCY)
Dept: RADIOLOGY | Facility: HOSPITAL | Age: 79
End: 2024-03-04
Payer: COMMERCIAL

## 2024-03-04 ENCOUNTER — CLINICAL SUPPORT (OUTPATIENT)
Dept: PULMONOLOGY | Facility: HOSPITAL | Age: 79
End: 2024-03-04
Attending: INTERNAL MEDICINE
Payer: COMMERCIAL

## 2024-03-04 VITALS
HEART RATE: 92 BPM | SYSTOLIC BLOOD PRESSURE: 90 MMHG | DIASTOLIC BLOOD PRESSURE: 60 MMHG | OXYGEN SATURATION: 91 % | TEMPERATURE: 97.3 F

## 2024-03-04 VITALS
HEIGHT: 68 IN | BODY MASS INDEX: 22.73 KG/M2 | RESPIRATION RATE: 18 BRPM | DIASTOLIC BLOOD PRESSURE: 60 MMHG | HEART RATE: 92 BPM | SYSTOLIC BLOOD PRESSURE: 116 MMHG | WEIGHT: 150 LBS | OXYGEN SATURATION: 98 % | TEMPERATURE: 98.4 F
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
## 2024-03-04 DIAGNOSIS — R55 SYNCOPE: Primary | ICD-10-CM

## 2024-03-04 DIAGNOSIS — J96.11 CHRONIC RESPIRATORY FAILURE WITH HYPOXIA (HCC): ICD-10-CM

## 2024-03-04 DIAGNOSIS — R53.1 WEAKNESS: ICD-10-CM

## 2024-03-04 DIAGNOSIS — K21.9 GASTROESOPHAGEAL REFLUX DISEASE, UNSPECIFIED WHETHER ESOPHAGITIS PRESENT: ICD-10-CM

## 2024-03-04 DIAGNOSIS — R55 NEAR SYNCOPE: ICD-10-CM

## 2024-03-04 DIAGNOSIS — J44.9 CHRONIC OBSTRUCTIVE PULMONARY DISEASE, UNSPECIFIED COPD TYPE (HCC): ICD-10-CM

## 2024-03-04 DIAGNOSIS — R13.12 OROPHARYNGEAL DYSPHAGIA: ICD-10-CM

## 2024-03-04 LAB
2HR DELTA HS TROPONIN: 7 NG/L
ANION GAP SERPL CALCULATED.3IONS-SCNC: 11 MMOL/L
BASOPHILS # BLD AUTO: 0.01 THOUSANDS/ÂΜL (ref 0–0.1)
BASOPHILS NFR BLD AUTO: 0 % (ref 0–1)
BUN SERPL-MCNC: 20 MG/DL (ref 5–25)
CALCIUM SERPL-MCNC: 9.1 MG/DL (ref 8.4–10.2)
CARDIAC TROPONIN I PNL SERPL HS: 32 NG/L
CARDIAC TROPONIN I PNL SERPL HS: 39 NG/L
CHLORIDE SERPL-SCNC: 102 MMOL/L (ref 96–108)
CO2 SERPL-SCNC: 25 MMOL/L (ref 21–32)
CREAT SERPL-MCNC: 1.49 MG/DL (ref 0.6–1.3)
EOSINOPHIL # BLD AUTO: 0 THOUSAND/ÂΜL (ref 0–0.61)
EOSINOPHIL NFR BLD AUTO: 0 % (ref 0–6)
ERYTHROCYTE [DISTWIDTH] IN BLOOD BY AUTOMATED COUNT: 15.7 % (ref 11.6–15.1)
GFR SERPL CREATININE-BSD FRML MDRD: 44 ML/MIN/1.73SQ M
GLUCOSE SERPL-MCNC: 134 MG/DL (ref 65–140)
HCT VFR BLD AUTO: 45.2 % (ref 36.5–49.3)
HGB BLD-MCNC: 13.5 G/DL (ref 12–17)
IMM GRANULOCYTES # BLD AUTO: 0.05 THOUSAND/UL (ref 0–0.2)
IMM GRANULOCYTES NFR BLD AUTO: 0 % (ref 0–2)
LYMPHOCYTES # BLD AUTO: 0.26 THOUSANDS/ÂΜL (ref 0.6–4.47)
LYMPHOCYTES NFR BLD AUTO: 2 % (ref 14–44)
MCH RBC QN AUTO: 31 PG (ref 26.8–34.3)
MCHC RBC AUTO-ENTMCNC: 29.9 G/DL (ref 31.4–37.4)
MCV RBC AUTO: 104 FL (ref 82–98)
MONOCYTES # BLD AUTO: 0.39 THOUSAND/ÂΜL (ref 0.17–1.22)
MONOCYTES NFR BLD AUTO: 4 % (ref 4–12)
NEUTROPHILS # BLD AUTO: 10.41 THOUSANDS/ÂΜL (ref 1.85–7.62)
NEUTS SEG NFR BLD AUTO: 94 % (ref 43–75)
NRBC BLD AUTO-RTO: 0 /100 WBCS
PLATELET # BLD AUTO: 194 THOUSANDS/UL (ref 149–390)
PMV BLD AUTO: 10.9 FL (ref 8.9–12.7)
POTASSIUM SERPL-SCNC: 3.8 MMOL/L (ref 3.5–5.3)
RBC # BLD AUTO: 4.35 MILLION/UL (ref 3.88–5.62)
SODIUM SERPL-SCNC: 138 MMOL/L (ref 135–147)
WBC # BLD AUTO: 11.12 THOUSAND/UL (ref 4.31–10.16)

## 2024-03-04 PROCEDURE — 93005 ELECTROCARDIOGRAM TRACING: CPT

## 2024-03-04 PROCEDURE — 36415 COLL VENOUS BLD VENIPUNCTURE: CPT | Performed by: STUDENT IN AN ORGANIZED HEALTH CARE EDUCATION/TRAINING PROGRAM

## 2024-03-04 PROCEDURE — 71045 X-RAY EXAM CHEST 1 VIEW: CPT

## 2024-03-04 PROCEDURE — 94626 PHY/QHP OP PULM RHB W/MNTR: CPT

## 2024-03-04 PROCEDURE — 99285 EMERGENCY DEPT VISIT HI MDM: CPT

## 2024-03-04 PROCEDURE — 99284 EMERGENCY DEPT VISIT MOD MDM: CPT | Performed by: STUDENT IN AN ORGANIZED HEALTH CARE EDUCATION/TRAINING PROGRAM

## 2024-03-04 PROCEDURE — 84484 ASSAY OF TROPONIN QUANT: CPT | Performed by: STUDENT IN AN ORGANIZED HEALTH CARE EDUCATION/TRAINING PROGRAM

## 2024-03-04 PROCEDURE — 80048 BASIC METABOLIC PNL TOTAL CA: CPT | Performed by: STUDENT IN AN ORGANIZED HEALTH CARE EDUCATION/TRAINING PROGRAM

## 2024-03-04 PROCEDURE — 99214 OFFICE O/P EST MOD 30 MIN: CPT | Performed by: PHYSICIAN ASSISTANT

## 2024-03-04 PROCEDURE — 85025 COMPLETE CBC W/AUTO DIFF WBC: CPT | Performed by: STUDENT IN AN ORGANIZED HEALTH CARE EDUCATION/TRAINING PROGRAM

## 2024-03-04 RX ORDER — SODIUM CHLORIDE 9 MG/ML
3 INJECTION INTRAVENOUS
Status: DISCONTINUED | OUTPATIENT
Start: 2024-03-04 | End: 2024-03-04 | Stop reason: HOSPADM

## 2024-03-04 NOTE — PROGRESS NOTES
[facility] - Outpatient Follow-up Note  Seun Alva 78 y.o. male MRN: [id number]  Encounter: [id number]    ASSESSMENT AND PLAN:      1. Fall, initial encounter  2. Weakness  3. Chronic respiratory failure with hypoxia (HCC)    This patient with significant cardiopulmonary disease suffered a ground-level fall on 3/4/2024 prior to his office visit.  He attributed this to weakness.  He has a history of hypotension, aspiration pneumonia.  He had an episode of nonbloody emesis a few nights ago and subsequently noted progressive shortness of breath.  He has a history of hypokalemia though most recent blood work on 2/26/2024 demonstrated normalization of this electrolyte abnormality.    Given patient's tenuous medical state I am recommending prompt evaluation in the emergency department given his weakness and fall.  I am concerned for some underlying infectious or metabolic derangement. In the least I would recommend a chest x-ray and general serologies.  I would also recommend additional outpatient investigation into patient's hypotension.    ER team updated with pt's information. ADT placed.     - Transfer to other facility    4. Oropharyngeal dysphagia  5. Gastroesophageal reflux disease, unspecified whether esophagitis present    Patient has a history of oropharyngeal dysphagia.  SLP evaluation with [facility] in 06/2023 demonstrated silent aspiration with sips of thin liquids via straw.  They had recommended mechanical soft diet with thickened liquids due to oropharyngeal dysfunction.  There was also recommendation for speech therapy, though I am not certain that this was completed.    We reviewed that it is unclear as to whether there is an esophageal component at this time.  I presented the option for patient to have additional noninvasive testing such as a plain barium swallow, and patient is interested in pursuing at this time.  However, given patient's comorbidities patient is high risk  "for cardiopulmonary decompensation with sedation for any type of procedure including that of an EGD.  After some discussion, we mutually agreed to avoid endoscopic evaluation if possible.    Patient will continue on omeprazole daily.  He will continue with small frequent meals and diet and lifestyle modifications for GERD.  I would recommend protein meal replacement drinks in an effort to maintain body weight and prevent further weight loss.     - FL barium swallow; Future    We will follow up with pt in approx 3 months to reassess symptoms.  ______________________________________________________________________    SUBJECTIVE: Patient is a 78 y.o. male who presents today for follow-up regarding dysphagia. Pmhx sig for oropharyngeal dysphagia, COPD, chronic respiratory failure with hypoxia on home oxygen, CHF with EF 35%, CAD on aspirin, CKDIII.    Patient is new to me.  He is here today to follow-up on his symptoms of dysphagia.  He unfortunately suffered a ground-level fall in the lobby prior to his office visit.  This was witnessed by several persons in the lobby.  Patient shares he started to feel weak and his legs just \"gave out from underneath me\".  He fell to the ground though did not hit his head or lose consciousness.  He had to be assisted to a wheelchair.  He was noted to be hypotensive when checked, which he shares is not atypical for him.  A few nights ago, after eating sweets and milk, he had an episode of nonbloody emesis.  Last evening, he noted more dyspnea than usual.  Today he was feeling quite weak, though has been afebrile.    Pertaining to his swallowing, he shares that he \"has to be careful\" with his oral intake.  His family is thickening his liquids to ingest.  He has had an additional hospitalization for COPD exacerbation thought to be secondary to an aspiration pneumonia in 01/2024.  His last video swallow study with SLP in 06/2023 commented on mild oropharyngeal dysphagia.    Pertaining to " his bowels, he is having a formed brown stool daily.  At times the stool is somewhat firm and he has a sense of incomplete evacuation.  He will frequently pass a small amount of stool when he urinates.  No BRBPR or melena. No nocturnal BM.    02/2024: CT A/P: No acute intrathoracic or intra-abdominal pathology.Small right pleural effusion, mildly increased since prior study.Compressive atelectasis in the lower lobes, right greater than left.Emphysema.Scattered calcified granulomata.No rib fractures or other acute fracture identified.  02/2024: Hb 14.3, , Plt 285, BUN 17, Cr 1.65, AST 26, ALT 18, ALP 98, albumin 3.9, t bili 0.97    Endoscopic history:   EGD: none  Colon: unknown     Review of Systems   Constitutional:  Negative for fever.   Gastrointestinal:  Positive for abdominal pain, constipation and diarrhea. Negative for nausea and vomiting.   Genitourinary:  Positive for dysuria and frequency. Negative for hematuria.   Musculoskeletal:  Negative for arthralgias and myalgias.   Neurological:  Negative for headaches.   Otherwise Per HPI    Historical Information   Past Medical History:   Diagnosis Date    Abnormal ECG 2021 OR 2022    Alcoholism (HCC) 1984    sober 38 years    Arthritis 2008    beginning    Bilateral carotid artery stenosis     Callus     Cancer (HCC)     skin    Chronic diastolic heart failure (HCC)     Chronic ischemic heart disease     Chronic kidney disease     stage 3    Colon polyp     COPD (chronic obstructive pulmonary disease) (HCC)     Coronary artery disease     hx stents, MI, PCI    COVID 11/2021    COVID 12/01/2023    CPAP (continuous positive airway pressure) dependence     Disease of thyroid gland 2017    nodules    Emphysema of lung (HCC) 1995    started    Hearing loss     History of transfusion 1995    Hyperlipidemia     Hypertension     Intracranial aneurysm 04/25/2023    Lung nodule 2023    x rays    MI (myocardial infarction) (HCC) 1995    Myocardial infarction (HCC)  1995    Pneumonia     Pneumothorax 02/20/2023    collapsed lung    Prostate cancer (HCC)     RSV (respiratory syncytial virus infection) 10/2022    S/P carotid endarterectomy     Shortness of breath     O2 2 l/nc PRN    Sleep apnea     Sleep apnea, obstructive     Stented coronary artery      Past Surgical History:   Procedure Laterality Date    APPENDECTOMY      CARDIAC CATHETERIZATION  03/18/2021    left main with no significant disease, proximal LAD with 10% stenosis at the site of prior stent, left circumflex artery with minimal luminal irregularities mid RCA with 50% stenosis at site of prior stent and PL segment with distal disease supplied by collaterals from the distal circumflex with no significant CAD requiring revascularization at that time.    CATARACT EXTRACTION, BILATERAL Bilateral     COLONOSCOPY      CORONARY ANGIOPLASTY WITH STENT PLACEMENT  1999    RCA    CORONARY ANGIOPLASTY WITH STENT PLACEMENT  2001    RCA    CORONARY ANGIOPLASTY WITH STENT PLACEMENT  2003    LAD    EYE SURGERY      WA BX PROSTATE STRTCTC SATURATION SAMPLING IMG GID N/A 09/13/2022    Procedure: TRANSPERINEAL MRI FUSION  BIOPSY PROSTATE;  Surgeon: Mele Patel MD;  Location: [facility];  Service: Urology    WA NEUROPLASTY &/TRANSPOS MEDIAN NRV CARPAL TUNNE Left 07/22/2022    Procedure: RELEASE CARPAL TUNNEL;  Surgeon: Matty Mcgowan MD;  Location: [facility];  Service: Orthopedics    WA NEUROPLASTY &/TRANSPOSITION ULNAR NERVE ELBOW Left 07/22/2022    Procedure: RELEASE CUBITAL TUNNEL;  Surgeon: Matty Mcgowan MD;  Location: [facility];  Service: Orthopedics    WA NEUROPLASTY &/TRANSPOSITION ULNAR NERVE WRIST Left 07/22/2022    Procedure: GUYON'S CANAL RELEASE;  Surgeon: Matty Mcgowan MD;  Location: [facility];  Service: Orthopedics    SKIN CANCER EXCISION  2012    chin-per pt, basal cell  also right ankle    TONSILLECTOMY  no     Social History   Social History     Substance and Sexual Activity   Alcohol Use Not  Currently     Social History     Substance and Sexual Activity   Drug Use Never     Social History     Tobacco Use   Smoking Status Former    Current packs/day: 0.00    Average packs/day: 2.0 packs/day for 35.5 years (71.0 ttl pk-yrs)    Types: Cigarettes    Start date: 1960    Quit date: 1995    Years since quittin.6   Smokeless Tobacco Never   Tobacco Comments    stopped smoking the day i had a heart attack     Family History   Problem Relation Age of Onset    Heart attack Mother     Dementia Mother     Heart failure Mother          2006    Heart disease Mother         heart attacks (2)    No Known Problems Father        Meds/Allergies       Current Outpatient Medications:     ALPRAZolam (XANAX) 0.25 mg tablet    aspirin 81 mg chewable tablet    budesonide (PULMICORT) 0.5 mg/2 mL nebulizer solution    cholecalciferol (VITAMIN D3) 1,000 units tablet    dextromethorphan-guaiFENesin (ROBITUSSIN DM)  mg/5 mL syrup    Empagliflozin (Jardiance) 10 MG TABS tablet    famotidine (PEPCID) 20 mg tablet    fluticasone (FLONASE) 50 mcg/act nasal spray    formoterol (PERFOROMIST) 20 MCG/2ML nebulizer solution    furosemide (LASIX) 40 mg tablet    gabapentin (NEURONTIN) 300 mg capsule    Humidifier MISC    HYDROcodone-acetaminophen (Norco) 5-325 mg per tablet    ipratropium-albuterol (DUO-NEB) 0.5-2.5 mg/3 mL nebulizer solution    lidocaine (Lidoderm) 5 %    methocarbamol (ROBAXIN) 500 mg tablet    metoprolol succinate (TOPROL-XL) 25 mg 24 hr tablet    midodrine (PROAMATINE) 2.5 mg tablet    naloxone (NARCAN) 4 mg/0.1 mL nasal spray    nystatin (MYCOSTATIN) 500,000 units/5 mL suspension    omeprazole (PriLOSEC) 20 mg delayed release capsule    oxygen gas    sodium chloride (OCEAN) 0.65 % nasal spray    potassium chloride 10% oral solution    rosuvastatin (CRESTOR) 40 MG tablet    Allergies   Allergen Reactions    Lisinopril Swelling and Cough    Tetanus Antitoxin Anaphylaxis    Tetanus Toxoid Anaphylaxis  and Swelling     Objective     Blood pressure 90/60, pulse 92, temperature (!) 97.3 °F (36.3 °C), SpO2 91%. There is no height or weight on file to calculate BMI.    Physical Exam  Vitals and nursing note reviewed.   Constitutional:       Appearance: He is well-developed.      Comments: Chronically ill appearing male   HENT:      Head: Normocephalic.   Eyes:      General: No scleral icterus.     Conjunctiva/sclera: Conjunctivae normal.   Cardiovascular:      Rate and Rhythm: Normal rate.   Pulmonary:      Effort: Pulmonary effort is normal.      Comments: 3L O2; decreased breath sounds in b/l bases  Abdominal:      General: There is no distension.      Palpations: Abdomen is soft.      Tenderness: There is no abdominal tenderness. There is no guarding or rebound.   Skin:     General: Skin is warm and dry.      Coloration: Skin is not jaundiced.      Findings: Bruising and rash present.   Neurological:      Mental Status: He is alert.       Lab Results:   No visits with results within 1 Day(s) from this visit.   Latest known visit with results is:   Appointment on 02/26/2024   Component Date Value    Sodium 02/26/2024 138     Potassium 02/26/2024 3.8     Chloride 02/26/2024 97     CO2 02/26/2024 34 (H)     ANION GAP 02/26/2024 7     BUN 02/26/2024 16     Creatinine 02/26/2024 1.76 (H)     Glucose 02/26/2024 60 (L)     Calcium 02/26/2024 9.2     eGFR 02/26/2024 35        Radiology Results:   XR chest pa & lateral    Result Date: 2/21/2024  Narrative: CHEST INDICATION:   Chronic obstructive pulmonary disease, unspecified. COMPARISON:  Comparison made with previous examination(s) dated (CT) 08-Feb-2024,(DX) 29-Jan-2024,(DX) 14-Jan-2024,(DX) 01-Dec-2023,(CT) 01-Dec-2023,(DX) 24-Nov-2023,(DX) 27-Oct-2023,(CT) 21-Oct-2023. EXAM PERFORMED/VIEWS:  XR CHEST PA & LATERAL Images: 4 FINDINGS: Cardiomediastinal silhouette appears unremarkable. The lungs are hyperinflated. There is bibasilar patchy airspace disease. No dense  consolidation seen.  No pneumothorax or pleural effusion. Osseous structures appear within normal limits for patient age.     Impression: Emphysematous changes in the lungs. Bibasilar atelectasis. Superimposed pneumonia would be difficult to exclude. Electronically signed: 02/21/2024 07:10 PM Sang Rene MD    XR spine lumbar minimum 4 views non injury    Result Date: 2/21/2024  Narrative: LUMBAR SPINE INDICATION:   Low back pain, unspecified. Other chronic pain. COMPARISON:  Comparison made with previous examination(s) dated (CT) 08-Feb-2024,(DX) 29-Jan-2024,(DX) 14-Jan-2024,(DX) 01-Dec-2023,(CT) 01-Dec-2023,(DX) 24-Nov-2023,(DX) 27-Oct-2023,(CT) 21-Oct-2023. VIEWS:  XR SPINE LUMBAR MINIMUM 4 VIEWS NON INJURY Images: 5 FINDINGS: There are 5 non rib bearing lumbar vertebral bodies. There is no evidence of acute fracture or destructive osseous lesion. Alignment is unremarkable. There is severe facet disease in the lower lumbar spine especially at L4-L5 L5-S1. There is moderate multilevel disc space narrowing and osteophytosis also worse at L4-L5 and L5-S1 The pedicles appear intact. Soft tissues are unremarkable.     Impression: Severe facet disease and moderate spondylosis worse at L4-L5 and L5-S1. Electronically signed: 02/21/2024 07:06 PM Sang Rene MD    CT chest abdomen pelvis w contrast    Result Date: 2/8/2024  Narrative: CT CHEST, ABDOMEN AND PELVIS WITH IV CONTRAST INDICATION: ro left side rib fractures. COMPARISON: CT chest, 12/1/2023. TECHNIQUE: CT examination of the chest, abdomen and pelvis was performed. Multiplanar 2D reformatted images were created from the source data. This examination, like all CT scans performed in the [facility], was performed utilizing techniques to minimize radiation dose exposure, including the use of iterative reconstruction and automated exposure control. Radiation dose length product (DLP) for this visit: 641 mGy-cm IV Contrast: 100 mL of iohexol  (OMNIPAQUE) Enteric Contrast: Not administered. FINDINGS: CHEST LUNGS: Scattered calcified granulomata. Emphysema, up to severe in the apices. Mild compressive atelectasis in the posterior lower lobes, right greater than left. PLEURA: Small right pleural effusion, slightly increased since prior study. Trace left pleural fluid. HEART/GREAT VESSELS: Moderate cardiomegaly. Coronary artery calcifications. No thoracic aortic aneurysm. MEDIASTINUM AND ZION: Unremarkable. CHEST WALL AND LOWER NECK: Subcentimeter peripherally calcified left thyroid nodules, not significantly changed. Mild symmetric gynecomastia. ABDOMEN LIVER/BILIARY TREE: Unremarkable. GALLBLADDER: No calcified gallstones. No pericholecystic inflammatory change. SPLEEN: Unremarkable. PANCREAS: Unremarkable. ADRENAL GLANDS: Unremarkable. KIDNEYS/URETERS: No hydronephrosis or urinary tract calculi. Subcentimeter hypoattenuating renal lesion(s), too small to characterize but statistically likely benign, which do not warrant follow-up (Radiology June 2019). STOMACH AND BOWEL: No evidence of bowel obstruction or gross inflammatory process. APPENDIX: No findings to suggest appendicitis. ABDOMINOPELVIC CAVITY: No ascites. No pneumoperitoneum. No lymphadenopathy. VESSELS: Atherosclerosis without abdominal aortic aneurysm. PELVIS REPRODUCTIVE ORGANS: Unremarkable for patient's age. URINARY BLADDER: Unremarkable. ABDOMINAL WALL/INGUINAL REGIONS: Unremarkable. BONES: No acute fracture or suspicious osseous lesion. Narrowing and partial ankylosis of the sacroiliac joints. Multilevel degenerative changes in the visible spine.     Impression: No acute intrathoracic or intra-abdominal pathology. Small right pleural effusion, mildly increased since prior study. Compressive atelectasis in the lower lobes, right greater than left. Emphysema. Scattered calcified granulomata. No rib fractures or other acute fracture identified. Chronic findings, as per the body of the report.  Workstation performed: [id number]     CT spine cervical without contrast    Result Date: 2/8/2024  Narrative: CT CERVICAL SPINE - WITHOUT CONTRAST INDICATION:   fall, elderly, poor historian. COMPARISON: 2/11/2023 TECHNIQUE:  CT examination of the cervical spine was performed without intravenous contrast.  Contiguous axial images were obtained. Multiplanar 2D reformatted images were created from the source data. Radiation dose length product (DLP) for this visit:  472 mGy-cm .  This examination, like all CT scans performed in the [facility], was performed utilizing techniques to minimize radiation dose exposure, including the use of iterative reconstruction and automated exposure control. IMAGE QUALITY:  Diagnostic. FINDINGS: ALIGNMENT:  Normal alignment of the cervical spine. No subluxation. VERTEBRAE:  No fracture. DEGENERATIVE CHANGES: Moderate multilevel cervical degenerative changes are noted. No critical central canal stenosis. Bridging bulky anterior osteophytes likely reflecting diffuse idiopathic skeletal hyperostosis. PREVERTEBRAL AND PARASPINAL SOFT TISSUES: Unremarkable THORACIC INLET: Subcentimeter peripherally calcified left thyroid lobe nodules. Emphysema biapically.     Impression: No cervical spine fracture or traumatic malalignment. Multilevel degenerative changes Workstation performed: [id number]     CT head without contrast    Result Date: 2/8/2024  Narrative: CT BRAIN - WITHOUT CONTRAST INDICATION:   fall, hx many chronic issues. COMPARISON: 5/28/2023 TECHNIQUE:  CT examination of the brain was performed.  Multiplanar 2D reformatted images were created from the source data. Radiation dose length product (DLP) for this visit:  840 mGy-cm .  This examination, like all CT scans performed in the [facility], was performed utilizing techniques to minimize radiation dose exposure, including the use of iterative reconstruction and automated exposure control. IMAGE  QUALITY:  Diagnostic. FINDINGS: PARENCHYMA: Decreased attenuation is noted in periventricular and subcortical white matter demonstrating an appearance that is statistically most likely to represent mild microangiopathic change. No CT signs of acute infarction.  No intracranial mass, mass effect or midline shift.  No acute parenchymal hemorrhage. VENTRICLES AND EXTRA-AXIAL SPACES: Ventricles and extra-axial CSF spaces are prominent commensurate with the degree of volume loss.  No hydrocephalus.  No acute extra-axial hemorrhage. VISUALIZED ORBITS: Status post bilateral cataract surgery. PARANASAL SINUSES: Small mucous retention cyst in the left ethmoid sinus. Secretions in the right maxillary sinus. CALVARIUM AND EXTRACRANIAL SOFT TISSUES:  Normal.     Impression: No acute intracranial abnormality. Chronic findings, as above. Workstation performed: [id number]       Sia Lowe PA-C    **Please note:  Dictation voice to text software may have been used in the creation of this record.  Occasional wrong word or “sound alike” substitutions may have occurred due to the inherent limitations of voice recognition software.  Read the chart carefully and recognize, using context, where substitutions have occurred.**

## 2024-03-04 NOTE — ED PROVIDER NOTES
She has a history of stage IV cervical history  Chief Complaint   Patient presents with    Weakness - Generalized    Syncope     Syncope at gastro office today; reports legs giving out     HPI 78-year-old male presents to the emergency department today for concerns of syncopal episode while at GI department.  Patient states he was in the outpatient setting when he felt his knees give out became weak but fortunately was caught by his wife and several bystanders and lowered down to the ground.  Patient was able to stand back up after a minute or so and felt fine.  However patient states he has a longstanding history of aspirational pneumonia and tenuous blood pressure and was advised by his providers to come to the emergency department for syncopal workup.  Here in the emergency department patient states he is completely asymptomatic feels back to baseline and has no concerns    Prior to Admission Medications   Prescriptions Last Dose Informant Patient Reported? Taking?   ALPRAZolam (XANAX) 0.25 mg tablet   No No   Sig: Take 1 tablet (0.25 mg total) by mouth daily at bedtime as needed for anxiety   Empagliflozin (Jardiance) 10 MG TABS tablet  Self, Spouse/Significant Other No No   Sig: Take 1 tablet (10 mg total) by mouth every morning   HYDROcodone-acetaminophen (Norco) 5-325 mg per tablet  Spouse/Significant Other, Self No No   Sig: Take 1 tablet by mouth every 6 (six) hours as needed for pain Max Daily Amount: 4 tablets   Humidifier MISC  Self, Spouse/Significant Other No No   Sig: Use continuous   aspirin 81 mg chewable tablet  Spouse/Significant Other, Self Yes No   Si mg daily at bedtime   budesonide (PULMICORT) 0.5 mg/2 mL nebulizer solution  Self, Spouse/Significant Other No No   Sig: Take 2 mL (0.5 mg total) by nebulization 2 (two) times a day Rinse mouth after use.   cholecalciferol (VITAMIN D3) 1,000 units tablet  Self, Spouse/Significant Other Yes No   Sig: Take 1,000 Units by mouth daily    dextromethorphan-guaiFENesin (ROBITUSSIN DM)  mg/5 mL syrup  Spouse/Significant Other, Self No No   Sig: Take 5 mL by mouth 3 (three) times a day as needed for cough   famotidine (PEPCID) 20 mg tablet   No No   Sig: Take 1 tablet (20 mg total) by mouth daily at bedtime   fluticasone (FLONASE) 50 mcg/act nasal spray   No No   Si spray into each nostril 2 (two) times a day   formoterol (PERFOROMIST) 20 MCG/2ML nebulizer solution  Self, Spouse/Significant Other No No   Sig: Take 2 mL (20 mcg total) by nebulization 2 (two) times a day   furosemide (LASIX) 40 mg tablet   No No   Sig: Take 1 tablet (40 mg total) by mouth daily   gabapentin (NEURONTIN) 300 mg capsule  Self, Spouse/Significant Other No No   Sig: Take 1 capsule (300 mg total) by mouth daily at bedtime   ipratropium-albuterol (DUO-NEB) 0.5-2.5 mg/3 mL nebulizer solution  Self, Spouse/Significant Other Yes No   lidocaine (Lidoderm) 5 %   No No   Sig: Apply 1 patch topically over 12 hours daily Remove & Discard patch within 12 hours or as directed by MD   methocarbamol (ROBAXIN) 500 mg tablet  Spouse/Significant Other, Self No No   Sig: Take 1 tablet (500 mg total) by mouth 3 (three) times a day as needed for muscle spasms   metoprolol succinate (TOPROL-XL) 25 mg 24 hr tablet  Self, Spouse/Significant Other No No   Sig: Take 0.5 tablets (12.5 mg total) by mouth 2 (two) times a day   midodrine (PROAMATINE) 2.5 mg tablet   No No   Sig: Take 1 tablet (2.5 mg total) by mouth 3 (three) times a day before meals Hold for SBP > 110mmHg   naloxone (NARCAN) 4 mg/0.1 mL nasal spray  Spouse/Significant Other, Self No No   Sig: Administer 1 spray into a nostril. If no response after 2-3 minutes, give another dose in the other nostril using a new spray.   nystatin (MYCOSTATIN) 500,000 units/5 mL suspension   No No   Si ml orally (swish and spit/swallow) every four hours while awake   omeprazole (PriLOSEC) 20 mg delayed release capsule  Self,  Spouse/Significant Other No No   Sig: take 1 capsule by mouth once daily   oxygen gas  Spouse/Significant Other, Self Yes No   Sig: Inhale 2 L/min continuous 2LPM at rest and 3-4 LPM with activity per D Cedric [place] notes   potassium chloride 10% oral solution   No No   Sig: Take 30 mL (40 mEq total) by mouth daily   Patient not taking: Reported on 2024   rosuvastatin (CRESTOR) 40 MG tablet  Spouse/Significant Other, Self No No   Sig: TAKE 1 TABLET DAILY   Patient not taking: Reported on 3/4/2024   sodium chloride (OCEAN) 0.65 % nasal spray   No No   Si spray into each nostril every hour as needed for congestion      Facility-Administered Medications: None       Past Medical History:   Diagnosis Date    Abnormal ECG  OR     Alcoholism ([facility]) 1984    sober 38 years    Arthritis 2008    beginning    Bilateral carotid artery stenosis     Callus     Cancer ([facility])     skin    Chronic diastolic heart failure ([facility])     Chronic ischemic heart disease     Chronic kidney disease     stage 3    Colon polyp     COPD (chronic obstructive pulmonary disease) ([facility])     Coronary artery disease     hx stents, MI, PCI    COVID 2021    COVID 2023    CPAP (continuous positive airway pressure) dependence     Disease of thyroid gland     nodules    Emphysema of lung ([facility])     started    Hearing loss     History of transfusion     Hyperlipidemia     Hypertension     Intracranial aneurysm 2023    Lung nodule     x rays    MI (myocardial infarction) ([facility])     Myocardial infarction ([facility])     Pneumonia     Pneumothorax 2023    collapsed lung    Prostate cancer ([facility])     RSV (respiratory syncytial virus infection) 10/2022    S/P carotid endarterectomy     Shortness of breath     O2 2 l/nc PRN    Sleep apnea     Sleep apnea, obstructive     Stented coronary artery        Past Surgical History:   Procedure Laterality Date    APPENDECTOMY      CARDIAC CATHETERIZATION  2021    left  main with no significant disease, proximal LAD with 10% stenosis at the site of prior stent, left circumflex artery with minimal luminal irregularities mid RCA with 50% stenosis at site of prior stent and PL segment with distal disease supplied by collaterals from the distal circumflex with no significant CAD requiring revascularization at that time.    CATARACT EXTRACTION, BILATERAL Bilateral     COLONOSCOPY      CORONARY ANGIOPLASTY WITH STENT PLACEMENT      RCA    CORONARY ANGIOPLASTY WITH STENT PLACEMENT      RCA    CORONARY ANGIOPLASTY WITH STENT PLACEMENT      LAD    EYE SURGERY      MI BX PROSTATE STRTCTC SATURATION SAMPLING IMG GID N/A 2022    Procedure: TRANSPERINEAL MRI FUSION  BIOPSY PROSTATE;  Surgeon: Mele Patel MD;  Location: [facility];  Service: Urology    MI NEUROPLASTY &/TRANSPOS MEDIAN NRV CARPAL TUNNE Left 2022    Procedure: RELEASE CARPAL TUNNEL;  Surgeon: Matty Mcgowan MD;  Location: [facility];  Service: Orthopedics    MI NEUROPLASTY &/TRANSPOSITION ULNAR NERVE ELBOW Left 2022    Procedure: RELEASE CUBITAL TUNNEL;  Surgeon: Matty Mcgowan MD;  Location: [facility];  Service: Orthopedics    MI NEUROPLASTY &/TRANSPOSITION ULNAR NERVE WRIST Left 2022    Procedure: GUYON'S CANAL RELEASE;  Surgeon: Matty Mcgowan MD;  Location: [facility];  Service: Orthopedics    SKIN CANCER EXCISION  2012    chin-per pt, basal cell  also right ankle    TONSILLECTOMY  no       Family History   Problem Relation Age of Onset    Heart attack Mother     Dementia Mother     Heart failure Mother          2006    Heart disease Mother         heart attacks (2)    No Known Problems Father      I have reviewed and agree with the history as documented.    E-Cigarette/Vaping    E-Cigarette Use Never User      E-Cigarette/Vaping Substances    Nicotine No     THC No     CBD No     Flavoring No     Other No     Unknown No      Social History     Tobacco Use    Smoking  status: Former     Current packs/day: 0.00     Average packs/day: 2.0 packs/day for 35.5 years (71.0 ttl pk-yrs)     Types: Cigarettes     Start date: 1960     Quit date: 1995     Years since quittin.6    Smokeless tobacco: Never    Tobacco comments:     stopped smoking the day i had a heart attack   Vaping Use    Vaping status: Never Used   Substance Use Topics    Alcohol use: Not Currently    Drug use: Never       Review of Systems   Constitutional:  Negative for activity change, appetite change, chills, fatigue and fever.   HENT:  Negative for congestion, dental problem, drooling, ear discharge, ear pain, facial swelling, postnasal drip, rhinorrhea and sinus pain.    Eyes:  Negative for photophobia, pain, discharge and itching.   Respiratory:  Negative for apnea, cough, chest tightness and shortness of breath.    Cardiovascular:  Negative for chest pain and leg swelling.   Gastrointestinal:  Negative for abdominal distention, abdominal pain, anal bleeding, constipation, diarrhea and nausea.   Endocrine: Negative for cold intolerance, heat intolerance and polydipsia.   Genitourinary:  Negative for difficulty urinating.   Musculoskeletal:  Negative for arthralgias, gait problem, joint swelling and myalgias.   Skin:  Negative for color change and pallor.   Allergic/Immunologic: Negative for immunocompromised state.   Neurological:  Negative for dizziness, seizures, facial asymmetry, weakness, light-headedness, numbness and headaches.   Psychiatric/Behavioral:  Negative for agitation, behavioral problems, confusion, decreased concentration and dysphoric mood.    All other systems reviewed and are negative.      Physical Exam  Physical Exam  Vitals and nursing note reviewed.   Constitutional:       Appearance: He is well-developed.   HENT:      Head: Normocephalic.   Eyes:      Pupils: Pupils are equal, round, and reactive to light.   Cardiovascular:      Rate and Rhythm: Normal rate and regular rhythm.    Pulmonary:      Effort: Pulmonary effort is normal.      Breath sounds: Normal breath sounds.   Abdominal:      General: Bowel sounds are normal.      Palpations: Abdomen is soft.   Musculoskeletal:         General: Normal range of motion.      Cervical back: Normal range of motion and neck supple.   Skin:     General: Skin is warm.         Vital Signs  ED Triage Vitals   Temperature Pulse Respirations Blood Pressure SpO2   03/04/24 1802 03/04/24 1745 03/04/24 1745 03/04/24 1745 03/04/24 1745   97.8 °F (36.6 °C) 89 18 (S) (!) 87/54 95 %      Temp Source Heart Rate Source Patient Position - Orthostatic VS BP Location FiO2 (%)   03/04/24 1930 03/04/24 1745 03/04/24 1745 03/04/24 1745 --   Temporal Monitor Lying Right arm       Pain Score       03/04/24 1745       No Pain           Vitals:    03/04/24 1745 03/04/24 1815 03/04/24 1830 03/04/24 1930   BP: (S) (!) 87/54 95/58 101/62 116/60   Pulse: 89 93 93 92   Patient Position - Orthostatic VS: Lying Sitting Sitting Lying         Visual Acuity      ED Medications  Medications   sodium chloride (PF) 0.9 % injection 3 mL (has no administration in time range)       Diagnostic Studies  Results Reviewed       Procedure Component Value Units Date/Time    HS Troponin I 2hr [[id number]]  (Normal) Collected: 03/04/24 2031    Lab Status: Final result Specimen: Blood from Arm, Left Updated: 03/04/24 2058     hs TnI 2hr 39 ng/L      Delta 2hr hsTnI 7 ng/L     HS Troponin I 4hr [[id number]]     Lab Status: No result Specimen: Blood     Basic metabolic panel [[id number]]  (Abnormal) Collected: 03/04/24 1843    Lab Status: Final result Specimen: Blood from Arm, Right Updated: 03/04/24 1903     Sodium 138 mmol/L      Potassium 3.8 mmol/L      Chloride 102 mmol/L      CO2 25 mmol/L      ANION GAP 11 mmol/L      BUN 20 mg/dL      Creatinine 1.49 mg/dL      Glucose 134 mg/dL      Calcium 9.1 mg/dL      eGFR 44 ml/min/1.73sq m     Narrative:      [employer]  guidelines for Chronic Kidney Disease (CKD):     Stage 1 with normal or high GFR (GFR > 90 mL/min/1.73 square meters)    Stage 2 Mild CKD (GFR = 60-89 mL/min/1.73 square meters)    Stage 3A Moderate CKD (GFR = 45-59 mL/min/1.73 square meters)    Stage 3B Moderate CKD (GFR = 30-44 mL/min/1.73 square meters)    Stage 4 Severe CKD (GFR = 15-29 mL/min/1.73 square meters)    Stage 5 End Stage CKD (GFR <15 mL/min/1.73 square meters)  Note: GFR calculation is accurate only with a steady state creatinine    CBC and differential [[id number]]  (Abnormal) Collected: 03/04/24 1759    Lab Status: Final result Specimen: Blood from Arm, Right Updated: 03/04/24 1832     WBC 11.12 Thousand/uL      RBC 4.35 Million/uL      Hemoglobin 13.5 g/dL      Hematocrit 45.2 %       fL      MCH 31.0 pg      MCHC 29.9 g/dL      RDW 15.7 %      MPV 10.9 fL      Platelets 194 Thousands/uL      nRBC 0 /100 WBCs      Neutrophils Relative 94 %      Immat GRANS % 0 %      Lymphocytes Relative 2 %      Monocytes Relative 4 %      Eosinophils Relative 0 %      Basophils Relative 0 %      Neutrophils Absolute 10.41 Thousands/µL      Immature Grans Absolute 0.05 Thousand/uL      Lymphocytes Absolute 0.26 Thousands/µL      Monocytes Absolute 0.39 Thousand/µL      Eosinophils Absolute 0.00 Thousand/µL      Basophils Absolute 0.01 Thousands/µL     Narrative:      This is an appended report.  These results have been appended to a previously verified report.    HS Troponin 0hr (reflex protocol) [[id number]]  (Normal) Collected: 03/04/24 1759    Lab Status: Final result Specimen: Blood from Arm, Right Updated: 03/04/24 1830     hs TnI 0hr 32 ng/L                    X-ray chest 1 view portable    (Results Pending)              Procedures  ECG 12 Lead Documentation Only    Date/Time: 3/4/2024 6:07 PM    Performed by: Bryce Durant MD  Authorized by: Bryce Durant MD    Indications / Diagnosis:  Syncope  Patient location:  ED  Previous ECG:     Previous  ECG:  Compared to current    Comparison to cardiac monitor: Yes    Interpretation:     Interpretation: abnormal    Rate:     ECG rate:  94  Rhythm:     Rhythm: sinus rhythm    Ectopy:     Ectopy: PVCs    QRS:     QRS axis:  Normal    QRS intervals:  Normal  Conduction:     Conduction: normal    ST segments:     ST segments:  Normal  T waves:     T waves: normal             ED Course  ED Course as of 03/04/24 2101   Mon Mar 04, 2024   1804 WBC(!): 11.12   1804 Hemoglobin: 13.5   1815 Chest x-ray reviewed by myself shows no acute intrathoracic abnormality.  Unchanged from previous.  Will follow-up formal read   1831 hs TnI 0hr: 32             HEART Risk Score      Flowsheet Row Most Recent Value   Heart Score Risk Calculator    History 1 Filed at: 03/04/2024 1831   ECG 1 Filed at: 03/04/2024 1831   Age 2 Filed at: 03/04/2024 1831   Risk Factors 1 Filed at: 03/04/2024 1831   Troponin 1 Filed at: 03/04/2024 1831   HEART Score 6 Filed at: 03/04/2024 1831                          SBIRT 20yo+      Flowsheet Row Most Recent Value   Initial Alcohol Screen: US AUDIT-C     1. How often do you have a drink containing alcohol? 0 Filed at: 03/04/2024 1745   2. How many drinks containing alcohol do you have on a typical day you are drinking?  0 Filed at: 03/04/2024 1745   3a. Male UNDER 65: How often do you have five or more drinks on one occasion? 0 Filed at: 03/04/2024 1745   3b. FEMALE Any Age, or MALE 65+: How often do you have 4 or more drinks on one occassion? 0 Filed at: 03/04/2024 1745   Audit-C Score 0 Filed at: 03/04/2024 1745   CRISTOPHER: How many times in the past year have you...    Used an illegal drug or used a prescription medication for non-medical reasons? Never Filed at: 03/04/2024 1745                      Medical Decision Making  Problems Addressed:  Near syncope: self-limited or minor problem  Syncope: self-limited or minor problem    Amount and/or Complexity of Data Reviewed  External Data Reviewed: ECG and  notes.  Labs: ordered. Decision-making details documented in ED Course.  Radiology: ordered.    Risk  Prescription drug management.             Disposition  Final diagnoses:   Syncope   Near syncope     Time reflects when diagnosis was documented in both MDM as applicable and the Disposition within this note       Time User Action Codes Description Comment    3/4/2024  8:36 PM Bryce Durant [R55] Syncope     3/4/2024  8:36 PM Bryce Durant [R55] Near syncope           ED Disposition       ED Disposition   Discharge    Condition   Stable    Date/Time   Mon Mar 4, 2024 2101    Comment   Seun Alva discharge to home/self care.                   Follow-up Information       Follow up With Specialties Details Why Contact Info    Sarahy Clifford DO Family Medicine   [address] [place] [address]  [address]  [place] [id number]  [phone]      Sarahy Clifford DO Family Medicine   [address] [place] [address]  [address]  [place] [id number]  [phone]              Patient's Medications   Discharge Prescriptions    No medications on file       No discharge procedures on file.    PDMP Review         Value Time User    PDMP Reviewed  Yes 2/20/2024  6:02 PM Sarahy Clifford DO            ED Provider  Electronically Signed by             Bryce Durant MD  03/04/24 2101

## 2024-03-05 LAB
ATRIAL RATE: 90 BPM
ATRIAL RATE: 94 BPM
P AXIS: 67 DEGREES
P AXIS: 71 DEGREES
PR INTERVAL: 164 MS
PR INTERVAL: 168 MS
QRS AXIS: 80 DEGREES
QRS AXIS: 84 DEGREES
QRSD INTERVAL: 92 MS
QRSD INTERVAL: 96 MS
QT INTERVAL: 368 MS
QT INTERVAL: 380 MS
QTC INTERVAL: 450 MS
QTC INTERVAL: 475 MS
T WAVE AXIS: -41 DEGREES
T WAVE AXIS: -74 DEGREES
VENTRICULAR RATE: 90 BPM
VENTRICULAR RATE: 94 BPM

## 2024-03-05 PROCEDURE — 93010 ELECTROCARDIOGRAM REPORT: CPT | Performed by: INTERNAL MEDICINE

## 2024-03-08 ENCOUNTER — CLINICAL SUPPORT (OUTPATIENT)
Dept: PULMONOLOGY | Facility: HOSPITAL | Age: 79
End: 2024-03-08
Attending: INTERNAL MEDICINE
Payer: COMMERCIAL

## 2024-03-08 DIAGNOSIS — J44.9 CHRONIC OBSTRUCTIVE PULMONARY DISEASE, UNSPECIFIED COPD TYPE (HCC): ICD-10-CM

## 2024-03-08 DIAGNOSIS — G89.29 CHRONIC BILATERAL LOW BACK PAIN WITHOUT SCIATICA: ICD-10-CM

## 2024-03-08 DIAGNOSIS — J44.1 COPD EXACERBATION (HCC): ICD-10-CM

## 2024-03-08 DIAGNOSIS — B37.0 ORAL CANDIDIASIS: ICD-10-CM

## 2024-03-08 DIAGNOSIS — M54.50 CHRONIC BILATERAL LOW BACK PAIN WITHOUT SCIATICA: ICD-10-CM

## 2024-03-08 PROCEDURE — 94626 PHY/QHP OP PULM RHB W/MNTR: CPT

## 2024-03-08 RX ORDER — LIDOCAINE 50 MG/G
1 PATCH TOPICAL DAILY
Qty: 30 PATCH | Refills: 11 | Status: ON HOLD | OUTPATIENT
Start: 2024-03-08

## 2024-03-08 RX ORDER — ECHINACEA PURPUREA EXTRACT 125 MG
1 TABLET ORAL
Qty: 30 ML | Refills: 11 | Status: SHIPPED | OUTPATIENT
Start: 2024-03-08 | End: 2024-03-11 | Stop reason: SDUPTHER

## 2024-03-08 NOTE — TELEPHONE ENCOUNTER
Patient requesting refill(s) of: Lidocaine     Last filled: 2/20/24  Last appt: 2/20/24  Next appt: 3/11/24  Pharmacy: [facility]       Patient requesting refill(s) of: Pettis nasal spray     Last filled: 2/22/24      Patient requesting refill(s) of: Nystatin     Last filled: 2/22/24

## 2024-03-11 ENCOUNTER — OFFICE VISIT (OUTPATIENT)
Dept: FAMILY MEDICINE CLINIC | Facility: CLINIC | Age: 79
End: 2024-03-11
Payer: COMMERCIAL

## 2024-03-11 VITALS
SYSTOLIC BLOOD PRESSURE: 116 MMHG | BODY MASS INDEX: 24.52 KG/M2 | HEART RATE: 76 BPM | WEIGHT: 161.8 LBS | DIASTOLIC BLOOD PRESSURE: 68 MMHG | RESPIRATION RATE: 18 BRPM | OXYGEN SATURATION: 91 % | TEMPERATURE: 97.2 F | HEIGHT: 68 IN

## 2024-03-11 DIAGNOSIS — J44.9 COPD, SEVERE (HCC): Primary | ICD-10-CM

## 2024-03-11 DIAGNOSIS — J34.1 MUCOUS RETENTION CYST OF MAXILLARY SINUS: ICD-10-CM

## 2024-03-11 DIAGNOSIS — G47.00 INSOMNIA, UNSPECIFIED TYPE: ICD-10-CM

## 2024-03-11 DIAGNOSIS — F41.9 ANXIETY: ICD-10-CM

## 2024-03-11 DIAGNOSIS — R68.2 DRY MOUTH: ICD-10-CM

## 2024-03-11 DIAGNOSIS — R10.30 LOWER ABDOMINAL PAIN: ICD-10-CM

## 2024-03-11 DIAGNOSIS — K14.8 TONGUE LESION: ICD-10-CM

## 2024-03-11 DIAGNOSIS — R53.81 PHYSICAL DECONDITIONING: ICD-10-CM

## 2024-03-11 DIAGNOSIS — R53.1 WEAKNESS: ICD-10-CM

## 2024-03-11 DIAGNOSIS — J96.11 CHRONIC RESPIRATORY FAILURE WITH HYPOXIA (HCC): ICD-10-CM

## 2024-03-11 DIAGNOSIS — I65.22 LEFT CAROTID ARTERY OCCLUSION: ICD-10-CM

## 2024-03-11 DIAGNOSIS — I67.1 INTRACRANIAL ANEURYSM: ICD-10-CM

## 2024-03-11 DIAGNOSIS — R09.81 NASAL CONGESTION: ICD-10-CM

## 2024-03-11 PROCEDURE — G2211 COMPLEX E/M VISIT ADD ON: HCPCS | Performed by: FAMILY MEDICINE

## 2024-03-11 PROCEDURE — 99215 OFFICE O/P EST HI 40 MIN: CPT | Performed by: FAMILY MEDICINE

## 2024-03-11 RX ORDER — GABAPENTIN 100 MG/1
CAPSULE ORAL
Qty: 9 CAPSULE | Refills: 0 | Status: SHIPPED | OUTPATIENT
Start: 2024-03-11 | End: 2024-03-18

## 2024-03-11 RX ORDER — FLUORIDE TOOTHPASTE
1 TOOTHPASTE DENTAL EVERY 4 HOURS PRN
Qty: 473 ML | Refills: 5 | Status: SHIPPED | OUTPATIENT
Start: 2024-03-11 | End: 2024-03-24

## 2024-03-11 RX ORDER — ECHINACEA PURPUREA EXTRACT 125 MG
1 TABLET ORAL
Qty: 30 ML | Refills: 11 | Status: SHIPPED | OUTPATIENT
Start: 2024-03-11 | End: 2024-03-24

## 2024-03-11 NOTE — PROGRESS NOTES
Assessment/Plan:       Problem List Items Addressed This Visit          Cardiovascular and Mediastinum    Left carotid artery occlusion    Intracranial aneurysm       Respiratory    Chronic respiratory failure with hypoxia (HCC)    COPD, severe (HCC) - Primary    Relevant Medications    sodium chloride (OCEAN) 0.65 % nasal spray    Other Relevant Orders    Ambulatory Referral to Palliative Care    Comprehensive metabolic panel (Completed)    CBC and differential (Completed)    XR chest pa & lateral (Completed)       Behavioral Health    Anxiety     Other Visit Diagnoses       Physical deconditioning        Weakness        Insomnia, unspecified type        Relevant Medications    sertraline (ZOLOFT) 50 mg tablet    gabapentin (Neurontin) 100 mg capsule    Nasal congestion        Relevant Medications    sodium chloride (OCEAN) 0.65 % nasal spray    Mucous retention cyst of maxillary sinus        Dry mouth        Relevant Medications    Mouthwashes (Biotene Dry Mouth) LIQD    Tongue lesion        Relevant Orders    Ambulatory Referral to Oral Maxillofacial Surgery    Lower abdominal pain        Relevant Orders    CT abdomen pelvis wo contrast              Continued follow-up with pulmonology, severe COPD and CRF baseline seems to be worsening.   We discussed palliative care, he is agreeable. Referral provided.   Start Zoloft 50 mg daily for anxiety, discussed Remeron however will hold off at this time given borderline QTc.   No change in sinus symptoms from baseline, recommended follow-up ENT.   White lesions noted on tongue, recommended OMS.   Biotine mouth wash for dry mouth.   CT abdomen/pelvis to evaluate lower abdominal pain, history of prostate CA.   He would try to try tapering off of gabapentin.   Close follow-up 4 weeks.     I have spent a total time of 55 minutes on 03/11/24 in caring for this patient including Diagnostic results, Risks and benefits of tx options, Instructions for management, Patient and  "family education, Importance of tx compliance, Risk factor reductions, Impressions, Counseling / Coordination of care, Documenting in the medical record, Reviewing / ordering tests, medicine, procedures  , and Obtaining or reviewing history  .      Subjective:      Patient ID: Seun Alva is a 78 y.o. male.    HPI    Recent ED visit 3/4 for near syncopal event, fall. He reports he is feeling weaker and weaker, using 4L with exertion, 3L at baseline. Breathing is about at baseline.     Anxiety- Feeling restlessness, uneasy, difficulty sleeping at night, anxious. Using Xanax at bedtime. Feeling frustrated with physical limitations. Frustrated with eating limitations as well, having to chew and puree foods.     US kidney/bladder showed \"Simple right renal cysts. Otherwise unremarkable study. \"    MRI brain showed     \"1. No acute infarction, intracranial hemorrhage or mass effect.  2. Mild, chronic microangiopathy redemonstrated.  3. Chronically occluded left internal carotid artery at the skull base.  4. Stable 2 to 3 mm fusiform aneurysm versus ectasia of the A1/A2 junction of the left anterior cerebral artery better characterized on prior CTA from a year ago.    Met with neurosurgery, no plan for intervention at this time.   5. Very mild/minimal right maxillary sinusitis with mucous retention cyst noted separately.\"    Struggling with dry mouth, uses powder for dentures.     Having recurrent and intermittent lower abdominal pain, both lower quadrants, normal BM. Pain sometimes very severe.     The following portions of the patient's history were reviewed and updated as appropriate: allergies, current medications, past family history, past medical history, past social history, past surgical history, and problem list.    Review of Systems   All other systems reviewed and are negative.        Objective:      /68   Pulse 76   Temp (!) 97.2 °F (36.2 °C) (Temporal)   Resp 18   Ht 5' 8\" (1.727 m)   Wt 73.4 " kg (161 lb 12.8 oz)   SpO2 91%   BMI 24.60 kg/m²          Physical Exam  Vitals reviewed.   Constitutional:       General: He is not in acute distress.     Appearance: Normal appearance. He is ill-appearing. He is not toxic-appearing or diaphoretic.   Cardiovascular:      Rate and Rhythm: Normal rate and regular rhythm.      Heart sounds: Normal heart sounds. No murmur heard.     No friction rub. No gallop.      Comments: Distant heart sounds, frequent skipped beats.   Pulmonary:      Effort: Pulmonary effort is normal. No respiratory distress.      Comments: Diminished breath sounds throughout  Skin:     General: Skin is warm.      Findings: No erythema or rash.   Neurological:      Mental Status: He is alert and oriented to person, place, and time.   Psychiatric:         Mood and Affect: Mood normal.         Behavior: Behavior normal.             Sarahy Clifford DO  [facility]

## 2024-03-12 ENCOUNTER — APPOINTMENT (OUTPATIENT)
Dept: LAB | Facility: HOSPITAL | Age: 79
DRG: 291 | End: 2024-03-12
Payer: COMMERCIAL

## 2024-03-12 ENCOUNTER — HOSPITAL ENCOUNTER (OUTPATIENT)
Dept: RADIOLOGY | Facility: HOSPITAL | Age: 79
Discharge: HOME | DRG: 291 | End: 2024-03-12
Payer: COMMERCIAL

## 2024-03-12 DIAGNOSIS — J44.9 COPD, SEVERE (HCC): ICD-10-CM

## 2024-03-12 LAB
ALBUMIN SERPL BCP-MCNC: 3.9 G/DL (ref 3.5–5)
ALP SERPL-CCNC: 123 U/L (ref 34–104)
ALT SERPL W P-5'-P-CCNC: 94 U/L (ref 7–52)
ANION GAP SERPL CALCULATED.3IONS-SCNC: 10 MMOL/L (ref 4–13)
AST SERPL W P-5'-P-CCNC: 46 U/L (ref 13–39)
BASOPHILS # BLD AUTO: 0.01 THOUSANDS/ÂΜL (ref 0–0.1)
BASOPHILS NFR BLD AUTO: 0 % (ref 0–1)
BILIRUB SERPL-MCNC: 0.81 MG/DL (ref 0.2–1)
BUN SERPL-MCNC: 37 MG/DL (ref 5–25)
CALCIUM SERPL-MCNC: 9.2 MG/DL (ref 8.4–10.2)
CHLORIDE SERPL-SCNC: 99 MMOL/L (ref 96–108)
CO2 SERPL-SCNC: 29 MMOL/L (ref 21–32)
CREAT SERPL-MCNC: 2.34 MG/DL (ref 0.6–1.3)
EOSINOPHIL # BLD AUTO: 0.01 THOUSAND/ÂΜL (ref 0–0.61)
EOSINOPHIL NFR BLD AUTO: 0 % (ref 0–6)
ERYTHROCYTE [DISTWIDTH] IN BLOOD BY AUTOMATED COUNT: 15.9 % (ref 11.6–15.1)
GFR SERPL CREATININE-BSD FRML MDRD: 25 ML/MIN/1.73SQ M
GLUCOSE P FAST SERPL-MCNC: 106 MG/DL (ref 65–99)
HCT VFR BLD AUTO: 46.7 % (ref 36.5–49.3)
HGB BLD-MCNC: 13.9 G/DL (ref 12–17)
IMM GRANULOCYTES # BLD AUTO: 0.05 THOUSAND/UL (ref 0–0.2)
IMM GRANULOCYTES NFR BLD AUTO: 1 % (ref 0–2)
LYMPHOCYTES # BLD AUTO: 0.71 THOUSANDS/ÂΜL (ref 0.6–4.47)
LYMPHOCYTES NFR BLD AUTO: 7 % (ref 14–44)
MCH RBC QN AUTO: 30.6 PG (ref 26.8–34.3)
MCHC RBC AUTO-ENTMCNC: 29.8 G/DL (ref 31.4–37.4)
MCV RBC AUTO: 103 FL (ref 82–98)
MONOCYTES # BLD AUTO: 1.13 THOUSAND/ÂΜL (ref 0.17–1.22)
MONOCYTES NFR BLD AUTO: 10 % (ref 4–12)
NEUTROPHILS # BLD AUTO: 8.94 THOUSANDS/ÂΜL (ref 1.85–7.62)
NEUTS SEG NFR BLD AUTO: 82 % (ref 43–75)
NRBC BLD AUTO-RTO: 0 /100 WBCS
PLATELET # BLD AUTO: 194 THOUSANDS/UL (ref 149–390)
PMV BLD AUTO: 11 FL (ref 8.9–12.7)
POTASSIUM SERPL-SCNC: 4.7 MMOL/L (ref 3.5–5.3)
PROT SERPL-MCNC: 6 G/DL (ref 6.4–8.4)
RBC # BLD AUTO: 4.54 MILLION/UL (ref 3.88–5.62)
SODIUM SERPL-SCNC: 138 MMOL/L (ref 135–147)
WBC # BLD AUTO: 10.85 THOUSAND/UL (ref 4.31–10.16)

## 2024-03-12 PROCEDURE — 36415 COLL VENOUS BLD VENIPUNCTURE: CPT

## 2024-03-12 PROCEDURE — 71046 X-RAY EXAM CHEST 2 VIEWS: CPT

## 2024-03-12 PROCEDURE — 80053 COMPREHEN METABOLIC PANEL: CPT

## 2024-03-12 PROCEDURE — 85025 COMPLETE CBC W/AUTO DIFF WBC: CPT

## 2024-03-13 PROBLEM — R74.01 TRANSAMINITIS: Status: ACTIVE | Noted: 2024-03-13

## 2024-03-13 PROBLEM — I10 PRIMARY HYPERTENSION: Status: ACTIVE | Noted: 2024-01-01

## 2024-03-13 PROBLEM — N17.9 AKI (ACUTE KIDNEY INJURY) (HCC): Status: ACTIVE | Noted: 2024-01-01

## 2024-03-13 PROBLEM — R93.89 ABNORMAL CT SCAN: Status: ACTIVE | Noted: 2024-01-01

## 2024-03-13 NOTE — ASSESSMENT & PLAN NOTE
CT a/p shows:  Persistent moderate right pleural effusion.  Consult IR for possible thoracentesis   Overlying atelectasis with evidence of aspiration of hyperdense material.  Speech evaluation   Aspiration precautions

## 2024-03-13 NOTE — ASSESSMENT & PLAN NOTE
BP has been on the lower side  He was started on midodrine outpatient  Continue Toprol 12.5 mg daily with holding parameters  Gentle IV fluids  Monitor pressure closely and adjust medication as indicated

## 2024-03-13 NOTE — ASSESSMENT & PLAN NOTE
Likely pre-renal azotemia.  The patient reports using furosemide 40 mg twice daily for the past month due to increasing lower extremity edema.  CT of abdomen and pelvis-simple renal cyst.  No hydronephrosis.  Consult nephrology   Urinary retention protocol  Hold furosemide for now  Gentle IV fluids  Nephrotoxins and hypotension  Monitor renal function close. Follow up with urine studies.

## 2024-03-13 NOTE — ASSESSMENT & PLAN NOTE
Wt Readings from Last 3 Encounters:   03/13/24 73.5 kg (162 lb 0.6 oz)   03/11/24 73.4 kg (161 lb 12.8 oz)   03/04/24 68 kg (150 lb)     Currently compensated but with lower extremity edema  CT a/p-persistent moderate right pleural effusion.  Check BNP   Continue with medical management   Low sodium diet, daily weight, I&O  Hold furosemide for now due to CHEYANNE

## 2024-03-13 NOTE — PLAN OF CARE
Problem: PAIN - ADULT  Goal: Verbalizes/displays adequate comfort level or baseline comfort level  Description: Interventions:  - Encourage patient to monitor pain and request assistance  - Assess pain using appropriate pain scale  - Administer analgesics based on type and severity of pain and evaluate response  - Implement non-pharmacological measures as appropriate and evaluate response  - Consider cultural and social influences on pain and pain management  - Notify physician/advanced practitioner if interventions unsuccessful or patient reports new pain  Outcome: Progressing     Problem: INFECTION - ADULT  Goal: Absence or prevention of progression during hospitalization  Description: INTERVENTIONS:  - Assess and monitor for signs and symptoms of infection  - Monitor lab/diagnostic results  - Monitor all insertion sites, i.e. indwelling lines, tubes, and drains  - Monitor endotracheal if appropriate and nasal secretions for changes in amount and color  - [place] appropriate cooling/warming therapies per order  - Administer medications as ordered  - Instruct and encourage patient and family to use good hand hygiene technique  - Identify and instruct in appropriate isolation precautions for identified infection/condition  Outcome: Progressing     Problem: Knowledge Deficit  Goal: Patient/family/caregiver demonstrates understanding of disease process, treatment plan, medications, and discharge instructions  Description: Complete learning assessment and assess knowledge base.  Interventions:  - Provide teaching at level of understanding  - Provide teaching via preferred learning methods  Outcome: Progressing

## 2024-03-13 NOTE — RESPIRATORY THERAPY NOTE
RT Protocol Note  Seun Alva 78 y.o. male MRN: [id number]  Unit/Bed#: -01 Encounter: [id number]    Assessment    Principal Problem:    CHEYANNE (acute kidney injury) ([facility])  Active Problems:    LAMBERTO on CPAP    Chronic respiratory failure with hypoxia (HCC)    Congestive heart failure with left ventricular systolic dysfunction (LVSD) ([facility])    Primary hypertension    Abnormal CT scan    Transaminitis      Home Pulmonary Medications:  Duoneb,pulmicort, performist  Home Devices/Therapy: Home O2, BiPAP/CPAP    Past Medical History:   Diagnosis Date    Abnormal ECG 2021 OR 2022    Alcoholism (HCC) 1984    sober 38 years    Arthritis 2008    beginning    Bilateral carotid artery stenosis     Callus     Cancer (HCC)     skin    Chronic diastolic heart failure (HCC)     Chronic ischemic heart disease     Chronic kidney disease     stage 3    Colon polyp     COPD (chronic obstructive pulmonary disease) ([facility])     Coronary artery disease     hx stents, MI, PCI    COVID 11/2021    COVID 12/01/2023    CPAP (continuous positive airway pressure) dependence     Disease of thyroid gland 2017    nodules    Emphysema of lung ([facility]) 1995    started    Hearing loss     History of transfusion 1995    Hyperlipidemia     Hypertension     Intracranial aneurysm 04/25/2023    Lung nodule 2023    x rays    MI (myocardial infarction) ([facility]) 1995    Myocardial infarction ([facility]) 1995    Pneumonia     Pneumothorax 02/20/2023    collapsed lung    Prostate cancer ([facility])     RSV (respiratory syncytial virus infection) 10/2022    S/P carotid endarterectomy     Shortness of breath     O2 2 l/nc PRN    Sleep apnea     Sleep apnea, obstructive     Stented coronary artery      Social History     Socioeconomic History    Marital status: /Civil Union     Spouse name: None    Number of children: None    Years of education: None    Highest education level: None   Occupational History    None   Tobacco Use    Smoking status: Former     Current  packs/day: 0.00     Average packs/day: 2.0 packs/day for 35.5 years (71.0 ttl pk-yrs)     Types: Cigarettes     Start date: 1960     Quit date: 1995     Years since quittin.7    Smokeless tobacco: Never    Tobacco comments:     stopped smoking the day i had a heart attack   Vaping Use    Vaping status: Never Used   Substance and Sexual Activity    Alcohol use: Not Currently    Drug use: Never    Sexual activity: Not Currently     Partners: Female     Birth control/protection: None   Other Topics Concern    None   Social History Narrative    None     Social Determinants of Health     Financial Resource Strain: Patient Declined (2023)    Overall Financial Resource Strain (CARDIA)     Difficulty of Paying Living Expenses: Patient declined   Food Insecurity: No Food Insecurity (10/19/2023)    Hunger Vital Sign     Worried About Running Out of Food in the Last Year: Never true     Ran Out of Food in the Last Year: Never true   Transportation Needs: No Transportation Needs (10/19/2023)    PRAPARE - Transportation     Lack of Transportation (Medical): No     Lack of Transportation (Non-Medical): No   Physical Activity: Not on file   Stress: Not on file   Social Connections: Not on file   Intimate Partner Violence: Not At Risk (2021)    Received from [facility]    Interpersonal Safety     Social Determinants: Intimate Partner Violence Past Fear: Not on file     Social Determinants: Intimate Partner Violence Current Fear: Not on file   Housing Stability: Low Risk  (10/19/2023)    Housing Stability Vital Sign     Unable to Pay for Housing in the Last Year: No     Number of Places Lived in the Last Year: 1     Unstable Housing in the Last Year: No       Subjective         Objective    Physical Exam:   Assessment Type: Assess only  General Appearance: Awake, Alert  Respiratory Pattern: Normal  Chest Assessment: Chest expansion symmetrical  Bilateral Breath Sounds: Diminished  O2 Device:  "nc    Vitals:  Blood pressure 102/69, pulse 92, temperature (!) 97.4 °F (36.3 °C), temperature source Oral, resp. rate 18, height 5' 8\" (1.727 m), weight 73.5 kg (162 lb 0.6 oz), SpO2 (!) 87%.          Imaging and other studies: I have personally reviewed pertinent reports.      O2 Device: nc     Plan    Respiratory Plan: Home Bronchodilator Patient pathway        Resp Comments: Pt. admitted for abnormal lab tests. Hx of COPD. Pt wears 2-3L O2 at home. Pt uses multiple pulm med sta home along with cpap at HS. Will continue current treatment regimen while in the hospital   "

## 2024-03-13 NOTE — ASSESSMENT & PLAN NOTE
CT of abdomen and pelvis without biliary tree dilatation. Possible related congestive hepatopathy.   Repeat liver function test in the a.m.  Hold statin for now

## 2024-03-13 NOTE — ED PROVIDER NOTES
History  Chief Complaint   Patient presents with    Abnormal Lab     Pt was called by pcp due to abnormal renal labs     Patient is a 78-year-old male past medical history of COPD, CKD arriving for evaluation of abnormal lab value.  Patient reports that he was directed by his PCP to come the emergency room for elevated creatinine.  Patient states that without this phone call he would not of come to the emergency room.  Patient denies any chest pain, or abnormal shortness of breath.  Patient states that he has a chronic oxygen requirement but this is no higher, and has no shortness of breath past his baseline.  Patient reports that for the past 2 years he has had intermittent abdominal pains on his left and right sides that originate in the flanks.  Patient states that most recently this was pain was on his left.  Patient denies any fevers or chills.  Patient denies any decrease in urinary output.  Patient denies urinary symptoms.        Prior to Admission Medications   Prescriptions Last Dose Informant Patient Reported? Taking?   ALPRAZolam (XANAX) 0.25 mg tablet   No No   Sig: Take 1 tablet (0.25 mg total) by mouth daily at bedtime as needed for anxiety   Empagliflozin (Jardiance) 10 MG TABS tablet  Self, Spouse/Significant Other No No   Sig: Take 1 tablet (10 mg total) by mouth every morning   HYDROcodone-acetaminophen (Norco) 5-325 mg per tablet  Spouse/Significant Other, Self No No   Sig: Take 1 tablet by mouth every 6 (six) hours as needed for pain Max Daily Amount: 4 tablets   Humidifier MISC  Self, Spouse/Significant Other No No   Sig: Use continuous   Mouthwashes (Biotene Dry Mouth) LIQD   No No   Sig: Apply 1 each to the mouth or throat every 4 (four) hours as needed (Dry mouth)   aspirin 81 mg chewable tablet  Spouse/Significant Other, Self Yes No   Si mg daily at bedtime   budesonide (PULMICORT) 0.5 mg/2 mL nebulizer solution  Self, Spouse/Significant Other No No   Sig: Take 2 mL (0.5 mg total) by  nebulization 2 (two) times a day Rinse mouth after use.   cholecalciferol (VITAMIN D3) 1,000 units tablet  Self, Spouse/Significant Other Yes No   Sig: Take 1,000 Units by mouth daily   dextromethorphan-guaiFENesin (ROBITUSSIN DM)  mg/5 mL syrup  Spouse/Significant Other, Self No No   Sig: Take 5 mL by mouth 3 (three) times a day as needed for cough   famotidine (PEPCID) 20 mg tablet   No No   Sig: Take 1 tablet (20 mg total) by mouth daily at bedtime   fluticasone (FLONASE) 50 mcg/act nasal spray   No No   Si spray into each nostril 2 (two) times a day   formoterol (PERFOROMIST) 20 MCG/2ML nebulizer solution  Self, Spouse/Significant Other No No   Sig: Take 2 mL (20 mcg total) by nebulization 2 (two) times a day   furosemide (LASIX) 40 mg tablet   No No   Sig: Take 1 tablet (40 mg total) by mouth daily   gabapentin (Neurontin) 100 mg capsule   No No   Sig: Take 2 capsules (200 mg total) by mouth daily at bedtime for 3 days, THEN 1 capsule (100 mg total) daily at bedtime for 3 days.   ipratropium-albuterol (DUO-NEB) 0.5-2.5 mg/3 mL nebulizer solution  Self, Spouse/Significant Other Yes No   lidocaine (Lidoderm) 5 %   No No   Sig: Apply 1 patch topically over 12 hours daily Remove & Discard patch within 12 hours or as directed by MD   methocarbamol (ROBAXIN) 500 mg tablet  Spouse/Significant Other, Self No No   Sig: Take 1 tablet (500 mg total) by mouth 3 (three) times a day as needed for muscle spasms   metoprolol succinate (TOPROL-XL) 25 mg 24 hr tablet  Self, Spouse/Significant Other No No   Sig: Take 0.5 tablets (12.5 mg total) by mouth 2 (two) times a day   Patient taking differently: Take 12.5 mg by mouth daily   midodrine (PROAMATINE) 2.5 mg tablet   No No   Sig: Take 1 tablet (2.5 mg total) by mouth 3 (three) times a day before meals Hold for SBP > 110mmHg   naloxone (NARCAN) 4 mg/0.1 mL nasal spray  Spouse/Significant Other, Self No No   Sig: Administer 1 spray into a nostril. If no response after  2-3 minutes, give another dose in the other nostril using a new spray.   omeprazole (PriLOSEC) 20 mg delayed release capsule  Self, Spouse/Significant Other No No   Sig: take 1 capsule by mouth once daily   oxygen gas  Spouse/Significant Other, Self Yes No   Sig: Inhale 2 L/min continuous 2LPM at rest and 3-4 LPM with activity per D Cedric FANG notes   potassium chloride 10% oral solution   No No   Sig: Take 30 mL (40 mEq total) by mouth daily   Patient taking differently: Take 20 mEq by mouth daily   rosuvastatin (CRESTOR) 40 MG tablet  Spouse/Significant Other, Self No No   Sig: TAKE 1 TABLET DAILY   sertraline (ZOLOFT) 50 mg tablet   No No   Sig: Take 1 tablet (50 mg total) by mouth daily   sodium chloride (OCEAN) 0.65 % nasal spray   No No   Si spray into each nostril every hour as needed for congestion      Facility-Administered Medications: None       Past Medical History:   Diagnosis Date    Abnormal ECG  OR     Alcoholism ([facility]) 1984    sober 38 years    Arthritis 2008    beginning    Bilateral carotid artery stenosis     Callus     Cancer ([facility])     skin    Chronic diastolic heart failure ([facility])     Chronic ischemic heart disease     Chronic kidney disease     stage 3    Colon polyp     COPD (chronic obstructive pulmonary disease) ([facility])     Coronary artery disease     hx stents, MI, PCI    COVID 2021    COVID 2023    CPAP (continuous positive airway pressure) dependence     Disease of thyroid gland 2017    nodules    Emphysema of lung ([facility])     started    Hearing loss     History of transfusion     Hyperlipidemia     Hypertension     Intracranial aneurysm 2023    Lung nodule     x rays    MI (myocardial infarction) ([facility])     Myocardial infarction ([facility])     Pneumonia     Pneumothorax 2023    collapsed lung    Prostate cancer ([facility])     RSV (respiratory syncytial virus infection) 10/2022    S/P carotid endarterectomy     Shortness of breath     O2 2 l/nc PRN     Sleep apnea     Sleep apnea, obstructive     Stented coronary artery        Past Surgical History:   Procedure Laterality Date    APPENDECTOMY      CARDIAC CATHETERIZATION  2021    left main with no significant disease, proximal LAD with 10% stenosis at the site of prior stent, left circumflex artery with minimal luminal irregularities mid RCA with 50% stenosis at site of prior stent and PL segment with distal disease supplied by collaterals from the distal circumflex with no significant CAD requiring revascularization at that time.    CATARACT EXTRACTION, BILATERAL Bilateral     COLONOSCOPY      CORONARY ANGIOPLASTY WITH STENT PLACEMENT      RCA    CORONARY ANGIOPLASTY WITH STENT PLACEMENT      RCA    CORONARY ANGIOPLASTY WITH STENT PLACEMENT      LAD    EYE SURGERY      OK BX PROSTATE STRTCTC SATURATION SAMPLING IMG GID N/A 2022    Procedure: TRANSPERINEAL MRI FUSION  BIOPSY PROSTATE;  Surgeon: Mele Patel MD;  Location: [facility];  Service: Urology    OK NEUROPLASTY &/TRANSPOS MEDIAN NRV CARPAL TUNNE Left 2022    Procedure: RELEASE CARPAL TUNNEL;  Surgeon: Matty Mcgowan MD;  Location: [facility];  Service: Orthopedics    OK NEUROPLASTY &/TRANSPOSITION ULNAR NERVE ELBOW Left 2022    Procedure: RELEASE CUBITAL TUNNEL;  Surgeon: Matty Mcgowan MD;  Location: [facility];  Service: Orthopedics    OK NEUROPLASTY &/TRANSPOSITION ULNAR NERVE WRIST Left 2022    Procedure: GUYON'S CANAL RELEASE;  Surgeon: Matty Mcgowan MD;  Location: [facility];  Service: Orthopedics    SKIN CANCER EXCISION      chin-per pt, basal cell  also right ankle    TONSILLECTOMY  no       Family History   Problem Relation Age of Onset    Heart attack Mother     Dementia Mother     Heart failure Mother          2006    Heart disease Mother         heart attacks (2)    No Known Problems Father      I have reviewed and agree with the history as documented.    E-Cigarette/Vaping     E-Cigarette Use Never User      E-Cigarette/Vaping Substances    Nicotine No     THC No     CBD No     Flavoring No     Other No     Unknown No      Social History     Tobacco Use    Smoking status: Former     Current packs/day: 0.00     Average packs/day: 2.0 packs/day for 35.5 years (71.0 ttl pk-yrs)     Types: Cigarettes     Start date: 1960     Quit date: 1995     Years since quittin.7    Smokeless tobacco: Never    Tobacco comments:     stopped smoking the day i had a heart attack   Vaping Use    Vaping status: Never Used   Substance Use Topics    Alcohol use: Not Currently    Drug use: Never       Review of Systems   Constitutional: Negative.    HENT: Negative.     Eyes: Negative.    Respiratory: Negative.     Cardiovascular: Negative.    Gastrointestinal:  Positive for abdominal pain.   Endocrine: Negative.    Genitourinary: Negative.  Negative for dysuria and frequency.   Musculoskeletal: Negative.    Skin: Negative.    Allergic/Immunologic: Negative.    Neurological: Negative.    Hematological: Negative.    Psychiatric/Behavioral: Negative.     All other systems reviewed and are negative.      Physical Exam  Physical Exam  Vitals and nursing note reviewed.   Constitutional:       Appearance: Normal appearance. He is normal weight.   HENT:      Head: Normocephalic.      Right Ear: External ear normal.      Left Ear: External ear normal.      Nose: Nose normal.      Mouth/Throat:      Mouth: Mucous membranes are moist.      Pharynx: Oropharynx is clear.   Eyes:      Extraocular Movements: Extraocular movements intact.      Conjunctiva/sclera: Conjunctivae normal.      Pupils: Pupils are equal, round, and reactive to light.   Cardiovascular:      Rate and Rhythm: Normal rate and regular rhythm.      Pulses: Normal pulses.      Heart sounds: Normal heart sounds.   Pulmonary:      Effort: Pulmonary effort is normal.      Breath sounds: Normal breath sounds.   Abdominal:      General: Abdomen is  flat. Bowel sounds are normal. There is no distension.      Palpations: Abdomen is soft.      Tenderness: There is abdominal tenderness. There is no right CVA tenderness or guarding.   Musculoskeletal:      Cervical back: Normal range of motion and neck supple.   Skin:     General: Skin is warm.      Capillary Refill: Capillary refill takes less than 2 seconds.   Neurological:      General: No focal deficit present.      Mental Status: He is alert and oriented to person, place, and time. Mental status is at baseline.   Psychiatric:         Mood and Affect: Mood normal.         Behavior: Behavior normal.         Thought Content: Thought content normal.         Judgment: Judgment normal.         Vital Signs  ED Triage Vitals   Temperature Pulse Respirations Blood Pressure SpO2   03/13/24 1352 03/13/24 1351 03/13/24 1351 03/13/24 1351 03/13/24 1351   (!) 96.7 °F (35.9 °C) 63 18 (!) 89/52 97 %      Temp Source Heart Rate Source Patient Position - Orthostatic VS BP Location FiO2 (%)   03/13/24 1352 03/13/24 1351 03/13/24 1351 03/13/24 1351 --   Temporal Monitor Lying Right arm       Pain Score       03/13/24 1351       No Pain           Vitals:    03/16/24 1715 03/16/24 2143 03/17/24 0745 03/17/24 0923   BP: 97/55 95/57 102/63 94/56   Pulse: 97 98 91 77   Patient Position - Orthostatic VS:             Visual Acuity      ED Medications  Medications   aspirin chewable tablet 81 mg (81 mg Oral Given 3/16/24 2153)   budesonide (PULMICORT) inhalation solution 0.5 mg (0.5 mg Nebulization Given 3/17/24 0725)   cholecalciferol (VITAMIN D3) tablet 1,000 Units (1,000 Units Oral Given 3/17/24 0920)   famotidine (PEPCID) tablet 10 mg (10 mg Oral Given 3/16/24 2152)   fluticasone (FLONASE) 50 mcg/act nasal spray 1 spray (1 spray Nasal Given 3/17/24 0920)   formoterol (PERFOROMIST) nebulizer solution 20 mcg (20 mcg Nebulization Given 3/17/24 0733)   metoprolol succinate (TOPROL-XL) 24 hr tablet 12.5 mg (25 mg Oral Not Given 3/17/24  0921)   pantoprazole (PROTONIX) EC tablet 20 mg (20 mg Oral Given 3/17/24 0501)   sertraline (ZOLOFT) tablet 50 mg (50 mg Oral Given 3/17/24 0920)   ALPRAZolam (XANAX) tablet 0.25 mg (0.25 mg Oral Given 3/16/24 0157)   dextromethorphan-guaiFENesin (ROBITUSSIN DM) oral syrup 5 mL (has no administration in time range)   HYDROcodone-acetaminophen (NORCO) 5-325 mg per tablet 1 tablet (1 tablet Oral Given 3/14/24 0234)   methocarbamol (ROBAXIN) tablet 500 mg (has no administration in time range)   saliva substitute (MOUTH KOTE) mucosal solution 1 spray (has no administration in time range)   sodium chloride (OCEAN) 0.65 % nasal spray 1 spray (1 spray Each Nare Given 3/14/24 2128)   heparin (porcine) subcutaneous injection 5,000 Units (5,000 Units Subcutaneous Given 3/17/24 0501)   levalbuterol (XOPENEX) inhalation solution 1.25 mg (1.25 mg Nebulization Given 3/17/24 0702)   ipratropium (ATROVENT) 0.02 % inhalation solution 0.5 mg (0.5 mg Nebulization Given 3/17/24 0702)   lidocaine (LIDODERM) 5 % patch 2 patch (2 patches Topical Patch Removed 3/17/24 0919)   midodrine (PROAMATINE) tablet 5 mg (5 mg Oral Given 3/17/24 0734)   furosemide (LASIX) injection 40 mg (40 mg Intravenous Not Given 3/17/24 0921)   gabapentin (NEURONTIN) capsule 200 mg (200 mg Oral Given 3/13/24 2211)     Followed by   gabapentin (NEURONTIN) capsule 100 mg (100 mg Oral Given 3/16/24 2152)   lidocaine (PF) (XYLOCAINE-MPF) 1 % injection (10 mL Infiltration Given 3/14/24 1042)   barium sulfate tablet 1 tablet (1 tablet Oral Given 3/14/24 1137)   barium sulfate 60 % cream 2.5 g (2.5 g Oral Given 3/14/24 1115)   barium sulfate 98 % oral suspension 175 mL (175 mL Oral Given 3/14/24 1116)   midodrine (PROAMATINE) tablet 2.5 mg (2.5 mg Oral Given 3/14/24 6447)   potassium chloride (Klor-Con M20) CR tablet 40 mEq (40 mEq Oral Given 3/16/24 1716)       Diagnostic Studies  Results Reviewed       Procedure Component Value Units Date/Time    UA w Reflex to  Microscopic w Reflex to Culture [[id number]]  (Abnormal) Collected: 03/13/24 1927    Lab Status: Final result Specimen: Urine, Clean Catch Updated: 03/13/24 1935     Color, UA Yellow     Clarity, UA Hazy     Specific Gravity, UA 1.015     pH, UA 6.0     Leukocytes, UA Negative     Nitrite, UA Negative     Protein, UA 2+ mg/dl      Glucose, UA 2+ mg/dl      Ketones, UA Negative mg/dl      Urobilinogen, UA 0.2 E.U./dl      Bilirubin, UA Negative     Occult Blood, UA 1+    B-Type Natriuretic Peptide(BNP) [[id number]]  (Abnormal) Collected: 03/13/24 1414    Lab Status: Final result Specimen: Blood from Arm, Right Updated: 03/13/24 1818     BNP >4,700 pg/mL     CMP [[id number]]  (Abnormal) Collected: 03/13/24 1414    Lab Status: Final result Specimen: Blood from Arm, Right Updated: 03/13/24 1439     Sodium 137 mmol/L      Potassium 4.7 mmol/L      Chloride 99 mmol/L      CO2 28 mmol/L      ANION GAP 10 mmol/L      BUN 35 mg/dL      Creatinine 2.30 mg/dL      Glucose 173 mg/dL      Calcium 9.2 mg/dL      AST 44 U/L      ALT 82 U/L      Alkaline Phosphatase 136 U/L      Total Protein 5.9 g/dL      Albumin 3.9 g/dL      Total Bilirubin 0.81 mg/dL      eGFR 26 ml/min/1.73sq m     Narrative:      National Kid[employer] Foundation guidelines for Chronic Kidney Disease (CKD):     Stage 1 with normal or high GFR (GFR > 90 mL/min/1.73 square meters)    Stage 2 Mild CKD (GFR = 60-89 mL/min/1.73 square meters)    Stage 3A Moderate CKD (GFR = 45-59 mL/min/1.73 square meters)    Stage 3B Moderate CKD (GFR = 30-44 mL/min/1.73 square meters)    Stage 4 Severe CKD (GFR = 15-29 mL/min/1.73 square meters)    Stage 5 End Stage CKD (GFR <15 mL/min/1.73 square meters)  Note: GFR calculation is accurate only with a steady state creatinine    Lipase [[id number]]  (Normal) Collected: 03/13/24 1414    Lab Status: Final result Specimen: Blood from Arm, Right Updated: 03/13/24 1439     Lipase 65 u/L     Magnesium [[id number]]  (Normal) Collected:  03/13/24 1414    Lab Status: Final result Specimen: Blood from Arm, Right Updated: 03/13/24 1439     Magnesium 2.3 mg/dL     CBC and differential [[id number]]  (Abnormal) Collected: 03/13/24 1414    Lab Status: Final result Specimen: Blood from Arm, Right Updated: 03/13/24 1425     WBC 9.84 Thousand/uL      RBC 4.56 Million/uL      Hemoglobin 14.2 g/dL      Hematocrit 46.9 %       fL      MCH 31.1 pg      MCHC 30.3 g/dL      RDW 15.9 %      MPV 11.2 fL      Platelets 177 Thousands/uL      nRBC 0 /100 WBCs      Neutrophils Relative 87 %      Immature Grans % 1 %      Lymphocytes Relative 5 %      Monocytes Relative 7 %      Eosinophils Relative 0 %      Basophils Relative 0 %      Neutrophils Absolute 8.61 Thousands/µL      Absolute Immature Grans 0.05 Thousand/uL      Absolute Lymphocytes 0.48 Thousands/µL      Absolute Monocytes 0.67 Thousand/µL      Eosinophils Absolute 0.01 Thousand/µL      Basophils Absolute 0.02 Thousands/µL                    FL barium swallow video w speech   Final Result by CRISTINO, DOCUMENTATION (03/14 1249)      FL barium swallow video w speech   Final Result by Juliana Siu, SLP (03/14 1427)      CT abdomen pelvis without contrast   Final Result by Junior Huerta MD (03/13 1515)      No acute abdominopelvic abnormality.      No hydronephrosis bilaterally.      Persistent moderate right pleural effusion.      Overlying atelectasis with evidence of aspiration of hyperdense material.         Study initially reviewed and reported by Dr. Barber.         Workstation performed: [id number]         IR IN-Patient Thoracentesis    (Results Pending)              Procedures  Procedures         ED Course                               SBIRT 22yo+      Flowsheet Row Most Recent Value   Initial Alcohol Screen: US AUDIT-C     1. How often do you have a drink containing alcohol? 0 Filed at: 03/13/2024 1350   2. How many drinks containing alcohol do you have on a typical day you are  drinking?  0 Filed at: 03/13/2024 1350   3a. Male UNDER 65: How often do you have five or more drinks on one occasion? 0 Filed at: 03/13/2024 1350   Audit-C Score 0 Filed at: 03/13/2024 1350   CRISTOPHER: How many times in the past year have you...    Used an illegal drug or used a prescription medication for non-medical reasons? Never Filed at: 03/13/2024 1350                      Medical Decision Making  Differential diagnosis including obstructing kidney stone, CHEYANNE, electrolyte abnormality   Patient is a 78-year-old male chronic medical problems including CKD, COPD arriving for evaluation of abnormal lab value.  Patient states that he would not come in today for was not prompting of his PCP about abnormal kidney function.  Patient noted to have a significant increase over the past 2 weeks, to his current biopsy of 2.3.  Patient reports he has had intermittent abdominal pain for the past 2 years that has not been identified as in the labs.  Will evaluate obstructive causes to superimposed CHEYANNE on CKD. No uti.   Patient noted to have no anemia, no leukocytosis.  Creatinine is similar to last week's value, but is significantly elevated to patient's baseline. No gross electrolyte abnormality.  Denies any recent increase in any diuretics.  No acute findings on CT scan to explain elevated creatinine. Patient to be admitted due to superimposed CHEYANNE. Case discussed with SLIM and patient admitted.     Amount and/or Complexity of Data Reviewed  Labs: ordered.  Radiology: ordered.    Risk  Decision regarding hospitalization.             Disposition  Final diagnoses:   CHEYANNE (acute kidney injury) (HCC)   Abdominal pain     Time reflects when diagnosis was documented in both MDM as applicable and the Disposition within this note       Time User Action Codes Description Comment    3/13/2024  3:44 PM Amber Walton Add [N17.9] CHEYANNE (acute kidney injury) (HCC)     3/13/2024  3:44 PM Amber Walton Add [R10.9] Abdominal pain            ED Disposition       ED Disposition   Admit    Condition   Stable    Date/Time   Wed Mar 13, 2024 3172    Comment   Case was discussed with Dr. Espana and the patient's admission status was agreed to be Admission Status: observation status to the service of Dr. Espana .               Follow-up Information    None         Current Discharge Medication List        CONTINUE these medications which have NOT CHANGED    Details   ALPRAZolam (XANAX) 0.25 mg tablet Take 1 tablet (0.25 mg total) by mouth daily at bedtime as needed for anxiety  Qty: 30 tablet, Refills: 0    Associated Diagnoses: Anxiety      aspirin 81 mg chewable tablet 81 mg daily at bedtime      budesonide (PULMICORT) 0.5 mg/2 mL nebulizer solution Take 2 mL (0.5 mg total) by nebulization 2 (two) times a day Rinse mouth after use.  Qty: 180 mL, Refills: 3    Associated Diagnoses: Chronic obstructive pulmonary disease, unspecified COPD type ([facility])      cholecalciferol (VITAMIN D3) 1,000 units tablet Take 1,000 Units by mouth daily      dextromethorphan-guaiFENesin (ROBITUSSIN DM)  mg/5 mL syrup Take 5 mL by mouth 3 (three) times a day as needed for cough  Qty: 354 mL, Refills: 0    Associated Diagnoses: COPD with exacerbation ([facility])      Empagliflozin (Jardiance) 10 MG TABS tablet Take 1 tablet (10 mg total) by mouth every morning  Qty: 90 tablet, Refills: 5    Associated Diagnoses: HFrEF (heart failure with reduced ejection fraction) ([facility])      famotidine (PEPCID) 20 mg tablet Take 1 tablet (20 mg total) by mouth daily at bedtime  Qty: 90 tablet, Refills: 0    Comments: Requesting 1 year supply  Associated Diagnoses: Gastroesophageal reflux disease, unspecified whether esophagitis present      fluticasone (FLONASE) 50 mcg/act nasal spray 1 spray into each nostril 2 (two) times a day  Qty: 9.9 mL, Refills: 5    Associated Diagnoses: COPD exacerbation ([facility])      formoterol (PERFOROMIST) 20 MCG/2ML nebulizer solution Take 2 mL (20 mcg total) by  nebulization 2 (two) times a day  Qty: 180 mL, Refills: 0    Associated Diagnoses: Chronic obstructive pulmonary disease, unspecified COPD type ([facility])      furosemide (LASIX) 40 mg tablet Take 1 tablet (40 mg total) by mouth daily    Associated Diagnoses: Chronic ischemic heart disease      gabapentin (Neurontin) 100 mg capsule Take 2 capsules (200 mg total) by mouth daily at bedtime for 3 days, THEN 1 capsule (100 mg total) daily at bedtime for 3 days.  Qty: 9 capsule, Refills: 0    Associated Diagnoses: Insomnia, unspecified type      Humidifier MISC Use continuous  Qty: 1 each, Refills: 0    Associated Diagnoses: Chronic respiratory failure with hypoxia ([facility])      HYDROcodone-acetaminophen (Norco) 5-325 mg per tablet Take 1 tablet by mouth every 6 (six) hours as needed for pain Max Daily Amount: 4 tablets  Qty: 15 tablet, Refills: 0    Associated Diagnoses: Costochondral chest pain      ipratropium-albuterol (DUO-NEB) 0.5-2.5 mg/3 mL nebulizer solution       lidocaine (Lidoderm) 5 % Apply 1 patch topically over 12 hours daily Remove & Discard patch within 12 hours or as directed by MD  Qty: 30 patch, Refills: 11    Associated Diagnoses: Chronic bilateral low back pain without sciatica      methocarbamol (ROBAXIN) 500 mg tablet Take 1 tablet (500 mg total) by mouth 3 (three) times a day as needed for muscle spasms  Qty: 30 tablet, Refills: 0    Associated Diagnoses: Chronic right-sided thoracic back pain      metoprolol succinate (TOPROL-XL) 25 mg 24 hr tablet Take 0.5 tablets (12.5 mg total) by mouth 2 (two) times a day  Qty: 30 tablet, Refills: 0    Associated Diagnoses: Acute on chronic right-sided congestive heart failure ([facility])      midodrine (PROAMATINE) 2.5 mg tablet Take 1 tablet (2.5 mg total) by mouth 3 (three) times a day before meals Hold for SBP > 110mmHg  Qty: 90 tablet, Refills: 6    Associated Diagnoses: Other specified hypotension      Mouthwashes (Biotene Dry Mouth) LIQD Apply 1 each to the mouth  or throat every 4 (four) hours as needed (Dry mouth)  Qty: 473 mL, Refills: 5    Associated Diagnoses: Dry mouth      naloxone (NARCAN) 4 mg/0.1 mL nasal spray Administer 1 spray into a nostril. If no response after 2-3 minutes, give another dose in the other nostril using a new spray.  Qty: 1 each, Refills: 1    Associated Diagnoses: Costochondral chest pain      omeprazole (PriLOSEC) 20 mg delayed release capsule take 1 capsule by mouth once daily  Qty: 30 capsule, Refills: 3    Associated Diagnoses: Gastroesophageal reflux disease, unspecified whether esophagitis present      oxygen gas Inhale 2 L/min continuous 2LPM at rest and 3-4 LPM with activity per D Cedric FANG notes      potassium chloride 10% oral solution Take 30 mL (40 mEq total) by mouth daily    Associated Diagnoses: Hypokalemia; HFrEF (heart failure with reduced ejection fraction) (HCC)      rosuvastatin (CRESTOR) 40 MG tablet TAKE 1 TABLET DAILY  Qty: 100 tablet, Refills: 3    Associated Diagnoses: Hyperlipidemia, unspecified hyperlipidemia type      sertraline (ZOLOFT) 50 mg tablet Take 1 tablet (50 mg total) by mouth daily  Qty: 30 tablet, Refills: 5    Associated Diagnoses: Insomnia, unspecified type      sodium chloride (OCEAN) 0.65 % nasal spray 1 spray into each nostril every hour as needed for congestion  Qty: 30 mL, Refills: 11    Associated Diagnoses: Nasal congestion             No discharge procedures on file.    PDMP Review         Value Time User    PDMP Reviewed  Yes 2/20/2024  6:02 PM Sarahy Clifford DO            ED Provider  Electronically Signed by             INOCENTE Acosta  03/17/24 0941

## 2024-03-13 NOTE — H&P
[facility] [place]  H&P  Name: Seun Alva 78 y.o. male I MRN: [id number]  Unit/Bed#: -01 I Date of Admission: 3/13/2024   Date of Service: 3/13/2024 I Hospital Day: 0      Assessment/Plan   * CHEYANNE (acute kidney injury) (HCC)  Assessment & Plan  Likely pre-renal azotemia.  The patient reports using furosemide 40 mg twice daily for the past month due to increasing lower extremity edema.  CT of abdomen and pelvis-simple renal cyst.  No hydronephrosis.  Consult nephrology   Urinary retention protocol  Hold furosemide for now  Gentle IV fluids  Nephrotoxins and hypotension  Monitor renal function close. Follow up with urine studies.     Transaminitis  Assessment & Plan  CT of abdomen and pelvis without biliary tree dilatation. Possible related congestive hepatopathy.   Repeat liver function test in the a.m.  Hold statin for now    Abnormal CT scan  Assessment & Plan  CT a/p shows:  Persistent moderate right pleural effusion.  Consult IR for possible thoracentesis   Overlying atelectasis with evidence of aspiration of hyperdense material.  Speech evaluation   Aspiration precautions     Primary hypertension  Assessment & Plan  BP has been on the lower side  He was started on midodrine outpatient  Continue Toprol 12.5 mg daily with holding parameters  Gentle IV fluids  Monitor pressure closely and adjust medication as indicated    Congestive heart failure with left ventricular systolic dysfunction (LVSD) (HCC)  Assessment & Plan  Wt Readings from Last 3 Encounters:   03/13/24 73.5 kg (162 lb 0.6 oz)   03/11/24 73.4 kg (161 lb 12.8 oz)   03/04/24 68 kg (150 lb)     Currently compensated but with lower extremity edema  CT a/p-persistent moderate right pleural effusion.  Check BNP   Continue with medical management   Low sodium diet, daily weight, I&O  Hold furosemide for now due to CHEYANNE            Chronic respiratory failure with hypoxia (HCC)  Assessment & Plan  History of COPD  Chronically wears  2-3L NC     LAMBERTO on CPAP  Assessment & Plan  CPAP at bedtime         VTE Pharmacologic Prophylaxis: VTE Score: 5 High Risk (Score >/= 5) - Pharmacological DVT Prophylaxis Ordered: heparin. Sequential Compression Devices Ordered.  Code Status: Level 3 - DNAR and DNI discussed with patient   Discussion with family:  patient will update family .     Anticipated Length of Stay: Patient will be admitted on an observation basis with an anticipated length of stay of less than 2 midnights secondary to CHEYANNE.    Total Time Spent on Date of Encounter in care of patient: 52 mins. This time was spent on one or more of the following: performing physical exam; counseling and coordination of care; obtaining or reviewing history; documenting in the medical record; reviewing/ordering tests, medications or procedures; communicating with other healthcare professionals and discussing with patient's family/caregivers.    Chief Complaint: Abnormal blood work    History of Present Illness:  Seun Alva is a 78 y.o. male with a PMH of congestive heart failure, chronic kidney disease, hypertension, COPD on chronic oxygen and CAD who presents with acute kidney injury.  The patient was seen by his PCP and recommended to come into the hospital due to CHEYANNE.  Patient states that he has been taking furosemide 40 mg twice daily over the past month due to worsening lower extremity edema.  His renal function has been worsening and subsequently PCP recommended to come to the ED.  The patient otherwise is feeling okay.  He had some poor appetite at home.  The patient denies any pain, palpitation, dyspnea worse than baseline, nausea, vomiting, or abdominal pain.    Review of Systems:  Review of Systems   Constitutional:  Positive for appetite change. Negative for chills, fatigue and fever.   HENT:  Negative for congestion, ear pain, postnasal drip and sore throat.    Eyes:  Negative for discharge, itching and visual disturbance.   Respiratory:   Negative for cough, chest tightness and shortness of breath.    Cardiovascular:  Positive for leg swelling. Negative for chest pain and palpitations.   Gastrointestinal:  Negative for abdominal pain, nausea and vomiting.   Endocrine: Negative for cold intolerance and heat intolerance.   Genitourinary:  Negative for difficulty urinating, dysuria and hematuria.   Musculoskeletal:  Negative for back pain, gait problem and neck pain.   Skin:  Negative for rash and wound.   Neurological:  Negative for dizziness, speech difficulty, weakness, light-headedness and headaches.   Psychiatric/Behavioral:  Negative for confusion, decreased concentration and dysphoric mood.        Past Medical and Surgical History:   Past Medical History:   Diagnosis Date    Abnormal ECG 2021 OR 2022    Alcoholism ([facility]) 1984    sober 38 years    Arthritis 2008    beginning    Bilateral carotid artery stenosis     Callus     Cancer ([facility])     skin    Chronic diastolic heart failure ([facility])     Chronic ischemic heart disease     Chronic kidney disease     stage 3    Colon polyp     COPD (chronic obstructive pulmonary disease) ([facility])     Coronary artery disease     hx stents, MI, PCI    COVID 11/2021    COVID 12/01/2023    CPAP (continuous positive airway pressure) dependence     Disease of thyroid gland 2017    nodules    Emphysema of lung ([facility]) 1995    started    Hearing loss     History of transfusion 1995    Hyperlipidemia     Hypertension     Intracranial aneurysm 04/25/2023    Lung nodule 2023    x rays    MI (myocardial infarction) ([facility]) 1995    Myocardial infarction ([facility]) 1995    Pneumonia     Pneumothorax 02/20/2023    collapsed lung    Prostate cancer ([facility])     RSV (respiratory syncytial virus infection) 10/2022    S/P carotid endarterectomy     Shortness of breath     O2 2 l/nc PRN    Sleep apnea     Sleep apnea, obstructive     Stented coronary artery        Past Surgical History:   Procedure Laterality Date    APPENDECTOMY      CARDIAC  CATHETERIZATION  03/18/2021    left main with no significant disease, proximal LAD with 10% stenosis at the site of prior stent, left circumflex artery with minimal luminal irregularities mid RCA with 50% stenosis at site of prior stent and PL segment with distal disease supplied by collaterals from the distal circumflex with no significant CAD requiring revascularization at that time.    CATARACT EXTRACTION, BILATERAL Bilateral     COLONOSCOPY      CORONARY ANGIOPLASTY WITH STENT PLACEMENT  1999    RCA    CORONARY ANGIOPLASTY WITH STENT PLACEMENT  2001    RCA    CORONARY ANGIOPLASTY WITH STENT PLACEMENT  2003    LAD    EYE SURGERY      CT BX PROSTATE STRTCTC SATURATION SAMPLING IMG GID N/A 09/13/2022    Procedure: TRANSPERINEAL MRI FUSION  BIOPSY PROSTATE;  Surgeon: Mele Patel MD;  Location: [facility];  Service: Urology    CT NEUROPLASTY &/TRANSPOS MEDIAN NRV CARPAL TUNNE Left 07/22/2022    Procedure: RELEASE CARPAL TUNNEL;  Surgeon: Matty Mcgowan MD;  Location: [facility];  Service: Orthopedics    CT NEUROPLASTY &/TRANSPOSITION ULNAR NERVE ELBOW Left 07/22/2022    Procedure: RELEASE CUBITAL TUNNEL;  Surgeon: Matty Mcgowan MD;  Location: [facility];  Service: Orthopedics    CT NEUROPLASTY &/TRANSPOSITION ULNAR NERVE WRIST Left 07/22/2022    Procedure: GUYON'S CANAL RELEASE;  Surgeon: Matty Mcgowan MD;  Location: [facility];  Service: Orthopedics    SKIN CANCER EXCISION  2012    chin-per pt, basal cell  also right ankle    TONSILLECTOMY  no       Meds/Allergies:  Prior to Admission medications    Medication Sig Start Date End Date Taking? Authorizing Provider   ALPRAZolam (XANAX) 0.25 mg tablet Take 1 tablet (0.25 mg total) by mouth daily at bedtime as needed for anxiety 2/20/24   Sarahy Clifford, DO   aspirin 81 mg chewable tablet 81 mg daily at bedtime    Historical Provider, MD   budesonide (PULMICORT) 0.5 mg/2 mL nebulizer solution Take 2 mL (0.5 mg total) by nebulization 2 (two) times a day  Rinse mouth after use. 12/22/23   Sarahy Clifford DO   cholecalciferol (VITAMIN D3) 1,000 units tablet Take 1,000 Units by mouth daily    Historical Provider, MD   dextromethorphan-guaiFENesin (ROBITUSSIN DM)  mg/5 mL syrup Take 5 mL by mouth 3 (three) times a day as needed for cough 2/13/23   Manny Steele PA-C   Empagliflozin (Jardiance) 10 MG TABS tablet Take 1 tablet (10 mg total) by mouth every morning 9/6/23 2/27/25  Bar Powell MD   famotidine (PEPCID) 20 mg tablet Take 1 tablet (20 mg total) by mouth daily at bedtime 2/13/24   Sarahy Clifford DO   fluticasone (FLONASE) 50 mcg/act nasal spray 1 spray into each nostril 2 (two) times a day 2/22/24   Sarahy Clifford DO   formoterol (PERFOROMIST) 20 MCG/2ML nebulizer solution Take 2 mL (20 mcg total) by nebulization 2 (two) times a day 11/28/23   Sarahy Clifford DO   furosemide (LASIX) 40 mg tablet Take 1 tablet (40 mg total) by mouth daily 2/24/24   Sarahy Clifford DO   gabapentin (Neurontin) 100 mg capsule Take 2 capsules (200 mg total) by mouth daily at bedtime for 3 days, THEN 1 capsule (100 mg total) daily at bedtime for 3 days. 3/11/24 3/17/24  Sarahy Clifford DO   Humidifier MISC Use continuous 11/28/23   INOCENTE Parisi   HYDROcodone-acetaminophen (Norco) 5-325 mg per tablet Take 1 tablet by mouth every 6 (six) hours as needed for pain Max Daily Amount: 4 tablets 6/30/23   Sarahy Clifford DO   ipratropium-albuterol (DUO-NEB) 0.5-2.5 mg/3 mL nebulizer solution  8/14/23   Historical Provider, MD   lidocaine (Lidoderm) 5 % Apply 1 patch topically over 12 hours daily Remove & Discard patch within 12 hours or as directed by MD 3/8/24   Sarahy Clifford DO   methocarbamol (ROBAXIN) 500 mg tablet Take 1 tablet (500 mg total) by mouth 3 (three) times a day as needed for muscle spasms 7/23/23   Sarahy Clifford DO   metoprolol succinate (TOPROL-XL) 25 mg 24 hr tablet Take 0.5 tablets (12.5 mg total) by mouth 2 (two) times a  day  Patient taking differently: Take 12.5 mg by mouth daily 10/27/23   Hyacinth Espana MD   midodrine (PROAMATINE) 2.5 mg tablet Take 1 tablet (2.5 mg total) by mouth 3 (three) times a day before meals Hold for SBP > 110mmHg 1/23/24   Rubio Sykes DO   Mouthwashes (Biotene Dry Mouth) LIQD Apply 1 each to the mouth or throat every 4 (four) hours as needed (Dry mouth) 3/11/24   Sarahy Clifford DO   naloxone (NARCAN) 4 mg/0.1 mL nasal spray Administer 1 spray into a nostril. If no response after 2-3 minutes, give another dose in the other nostril using a new spray. 6/30/23 6/29/24  Sarahy Clifford DO   omeprazole (PriLOSEC) 20 mg delayed release capsule take 1 capsule by mouth once daily 11/27/23   Shakira Curry PA-C   oxygen gas Inhale 2 L/min continuous 2LPM at rest and 3-4 LPM with activity per ROXANNE FANG notes    Historical Provider, MD   potassium chloride 10% oral solution Take 30 mL (40 mEq total) by mouth daily  Patient taking differently: Take 20 mEq by mouth daily 1/8/24   Sarahy Clifford DO   rosuvastatin (CRESTOR) 40 MG tablet TAKE 1 TABLET DAILY 11/21/22   Sarahy Clifford DO   sertraline (ZOLOFT) 50 mg tablet Take 1 tablet (50 mg total) by mouth daily 3/11/24 9/7/24  Sarahy Clifford DO   sodium chloride (OCEAN) 0.65 % nasal spray 1 spray into each nostril every hour as needed for congestion 3/11/24   Sarahy Clifford DO   Ascorbic Acid (vitamin C) 1000 MG tablet Take 1,000 mg by mouth daily  Patient not taking: No sig reported  10/15/22  Historical Provider, MD     I have reviewed home medications with patient personally.    Allergies:   Allergies   Allergen Reactions    Lisinopril Swelling and Cough    Tetanus Antitoxin Anaphylaxis    Tetanus Toxoid Anaphylaxis and Swelling       Social History:  Marital Status: /Civil Union   Occupation: retired   Patient Pre-hospital Living Situation: Home  Patient Pre-hospital Level of Mobility: walks  Patient Pre-hospital Diet  "Restrictions: cardiac  Substance Use History:   Social History     Substance and Sexual Activity   Alcohol Use Not Currently     Social History     Tobacco Use   Smoking Status Former    Current packs/day: 0.00    Average packs/day: 2.0 packs/day for 35.5 years (71.0 ttl pk-yrs)    Types: Cigarettes    Start date: 1960    Quit date: 1995    Years since quittin.7   Smokeless Tobacco Never   Tobacco Comments    stopped smoking the day i had a heart attack     Social History     Substance and Sexual Activity   Drug Use Never       Family History:  Family History   Problem Relation Age of Onset    Heart attack Mother     Dementia Mother     Heart failure Mother          2006    Heart disease Mother         heart attacks (2)    No Known Problems Father        Physical Exam:     Vitals:   Blood Pressure: 102/69 (24 1635)  Pulse: 92 (24 1635)  Temperature: (!) 97.4 °F (36.3 °C) (24 1635)  Temp Source: Oral (24 1635)  Respirations: 18 (24 1635)  Height: 5' 8\" (172.7 cm) (24 1631)  Weight - Scale: 73.5 kg (162 lb 0.6 oz) (24 1631)  SpO2: (!) 87 % (24 1635)    Physical Exam  Vitals and nursing note reviewed.   Constitutional:       General: He is not in acute distress.     Appearance: Normal appearance.      Comments: Frail and chronically ill appearing     HENT:      Head: Normocephalic and atraumatic.      Right Ear: External ear normal.      Left Ear: External ear normal.      Nose: Nose normal.      Mouth/Throat:      Mouth: Mucous membranes are moist.      Pharynx: Oropharynx is clear.   Eyes:      General:         Right eye: No discharge.         Left eye: No discharge.      Extraocular Movements: Extraocular movements intact.      Pupils: Pupils are equal, round, and reactive to light.   Cardiovascular:      Rate and Rhythm: Normal rate and regular rhythm.      Pulses: Normal pulses.      Heart sounds: Normal heart sounds. No murmur heard.  Pulmonary:     "  Effort: Pulmonary effort is normal. No respiratory distress.      Breath sounds: No wheezing or rales.      Comments: Very decreased on right side   Abdominal:      General: Bowel sounds are normal. There is no distension.      Palpations: Abdomen is soft. There is no mass.      Tenderness: There is no abdominal tenderness.   Musculoskeletal:         General: Swelling present. No tenderness or deformity. Normal range of motion.      Cervical back: Normal range of motion and neck supple. No rigidity.   Skin:     General: Skin is warm and dry.      Capillary Refill: Capillary refill takes less than 2 seconds.      Coloration: Skin is not pale.      Findings: No erythema.   Neurological:      General: No focal deficit present.      Mental Status: He is alert and oriented to person, place, and time. Mental status is at baseline.      Comments: Very hard of hearing    Psychiatric:         Mood and Affect: Mood normal.         Behavior: Behavior normal.         Thought Content: Thought content normal.         Judgment: Judgment normal.        Additional Data:     Lab Results:  Results from last 7 days   Lab Units 03/13/24  1414   WBC Thousand/uL 9.84   HEMOGLOBIN g/dL 14.2   HEMATOCRIT % 46.9   PLATELETS Thousands/uL 177   NEUTROS PCT % 87*   LYMPHS PCT % 5*   MONOS PCT % 7   EOS PCT % 0     Results from last 7 days   Lab Units 03/13/24  1414   SODIUM mmol/L 137   POTASSIUM mmol/L 4.7   CHLORIDE mmol/L 99   CO2 mmol/L 28   BUN mg/dL 35*   CREATININE mg/dL 2.30*   ANION GAP mmol/L 10   CALCIUM mg/dL 9.2   ALBUMIN g/dL 3.9   TOTAL BILIRUBIN mg/dL 0.81   ALK PHOS U/L 136*   ALT U/L 82*   AST U/L 44*   GLUCOSE RANDOM mg/dL 173*                       Lines/Drains:  Invasive Devices       Peripheral Intravenous Line  Duration             Peripheral IV 03/13/24 Distal;Right;Upper;Ventral (anterior) Arm <1 day                        Imaging: Reviewed radiology reports from this admission including: abdominal/pelvic CT  CT  abdomen pelvis without contrast   Final Result by Junior Huerta MD (03/13 8164)      No acute abdominopelvic abnormality.      No hydronephrosis bilaterally.      Persistent moderate right pleural effusion.      Overlying atelectasis with evidence of aspiration of hyperdense material.         Study initially reviewed and reported by Dr. Barber.         Workstation performed: [id number]         IR IN-Patient Thoracentesis    (Results Pending)       EKG and Other Studies Reviewed on Admission:   3/4/2024 EKG: NSR. HR 90.    ** Please Note: This note has been constructed using a voice recognition system. **

## 2024-03-14 ENCOUNTER — PATIENT OUTREACH (OUTPATIENT)
Dept: CASE MANAGEMENT | Facility: OTHER | Age: 79
End: 2024-03-14

## 2024-03-14 PROBLEM — E43 SEVERE PROTEIN-CALORIE MALNUTRITION (HCC): Status: ACTIVE | Noted: 2024-01-01

## 2024-03-14 NOTE — PLAN OF CARE

## 2024-03-14 NOTE — SPEECH THERAPY NOTE
Speech Language/Pathology  Speech/Language Pathology  Assessment    Patient Name: Seun Alva  Today's Date: 3/14/2024     Problem List  Principal Problem:    CHEYANNE (acute kidney injury) ([facility])  Active Problems:    LAMBERTO on CPAP    Chronic respiratory failure with hypoxia ([facility])    Congestive heart failure with left ventricular systolic dysfunction (LVSD) ([facility])    Primary hypertension    Abnormal CT scan    Transaminitis    Past Medical History  Past Medical History:   Diagnosis Date    Abnormal ECG 2021 OR 2022    Alcoholism ([facility]) 1984    sober 38 years    Arthritis 2008    beginning    Bilateral carotid artery stenosis     Callus     Cancer ([facility])     skin    Chronic diastolic heart failure ([facility])     Chronic ischemic heart disease     Chronic kidney disease     stage 3    Colon polyp     COPD (chronic obstructive pulmonary disease) ([facility])     Coronary artery disease     hx stents, MI, PCI    COVID 11/2021    COVID 12/01/2023    CPAP (continuous positive airway pressure) dependence     Disease of thyroid gland 2017    nodules    Emphysema of lung ([facility]) 1995    started    Hearing loss     History of transfusion 1995    Hyperlipidemia     Hypertension     Intracranial aneurysm 04/25/2023    Lung nodule 2023    x rays    MI (myocardial infarction) ([facility]) 1995    Myocardial infarction ([facility]) 1995    Pneumonia     Pneumothorax 02/20/2023    collapsed lung    Prostate cancer ([facility])     RSV (respiratory syncytial virus infection) 10/2022    S/P carotid endarterectomy     Shortness of breath     O2 2 l/nc PRN    Sleep apnea     Sleep apnea, obstructive     Stented coronary artery      Past Surgical History  Past Surgical History:   Procedure Laterality Date    APPENDECTOMY      CARDIAC CATHETERIZATION  03/18/2021    left main with no significant disease, proximal LAD with 10% stenosis at the site of prior stent, left circumflex artery with minimal luminal irregularities mid RCA with 50% stenosis at site of prior stent and PL  segment with distal disease supplied by collaterals from the distal circumflex with no significant CAD requiring revascularization at that time.    CATARACT EXTRACTION, BILATERAL Bilateral     COLONOSCOPY      CORONARY ANGIOPLASTY WITH STENT PLACEMENT  1999    RCA    CORONARY ANGIOPLASTY WITH STENT PLACEMENT  2001    RCA    CORONARY ANGIOPLASTY WITH STENT PLACEMENT  2003    LAD    EYE SURGERY      MS BX PROSTATE STRTCTC SATURATION SAMPLING IMG GID N/A 09/13/2022    Procedure: TRANSPERINEAL MRI FUSION  BIOPSY PROSTATE;  Surgeon: Mele Patel MD;  Location: [facility];  Service: Urology    MS NEUROPLASTY &/TRANSPOS MEDIAN NRV CARPAL TUNNE Left 07/22/2022    Procedure: RELEASE CARPAL TUNNEL;  Surgeon: Matty Mcgowan MD;  Location: [facility];  Service: Orthopedics    MS NEUROPLASTY &/TRANSPOSITION ULNAR NERVE ELBOW Left 07/22/2022    Procedure: RELEASE CUBITAL TUNNEL;  Surgeon: Matty Mcgowan MD;  Location: [facility];  Service: Orthopedics    MS NEUROPLASTY &/TRANSPOSITION ULNAR NERVE WRIST Left 07/22/2022    Procedure: GUYON'S CANAL RELEASE;  Surgeon: Matty Mcgowan MD;  Location: [facility];  Service: Orthopedics    SKIN CANCER EXCISION  2012    chin-per pt, basal cell  also right ankle    TONSILLECTOMY  no      Bedside Swallow Evaluation:    Summary:  Pt presented w/  mild/mod oral pharyngeal dysphagia. Positioned upright and fully alert. Pt is [place] utilizes his cell phone to transcribe. Presented with loose fitting dentures as the uppers moved while speaking. Pt stated he usually uses fixodent with powder to place as had issue with his plate last admission. Provident with fixodent pt able to place. Pt reported he will drink mostly ntl liquids throughout the day however will not thicken a few drinks. Also reported chopping po well home as increased difficulties with mastication. Trialed thin liquids via straw. Bolus control, formation, and transfer were WNL. X2 delayed cough with trials. Pt agreeable to  VBS to further assess.     Recommendations:  Diet:Pending results of VBS  Supervision: intermittent   Positioning:Upright  Strategies: :Pending results of VBS  Pt to take PO/Meds only when fully alert and upright.   Oral care  Aspiration precautions  Reflux precautions  Therapy Prognosis:fair   Prognosis considerations: age, medical status, participation    Frequency:2-5 times weeklys       Consider consult w/:  Rehab  Nutrition    Goal(s):  Pt will tolerate least restrictive diet w/out s/s aspiration or oral/pharyngeal difficulties.     H&P/Admit info/ pertinent provider notes: (PMH noted above)  Seun Alva is a 78 y.o. male with a PMH of congestive heart failure, chronic kidney disease, hypertension, COPD on chronic oxygen and CAD who presents with acute kidney injury.  The patient was seen by his PCP and recommended to come into the hospital due to CHEYANNE.  Patient states that he has been taking furosemide 40 mg twice daily over the past month due to worsening lower extremity edema.  His renal function has been worsening and subsequently PCP recommended to come to the ED.  The patient otherwise is feeling okay.  He had some poor appetite at home.  The patient denies any pain, palpitation, dyspnea worse than baseline, nausea, vomiting, or abdominal pain.       Special Studies:  CT abdomen (03/13/2024) impression   No acute abdominopelvic abnormality.  No hydronephrosis bilaterally.  Persistent moderate right pleural effusion.  Overlying atelectasis with evidence of aspiration of hyperdense material.  XR chest pa & lateral (03/14/2024) impression   Small right pleural effusion and adjacent passive atelectasis is similar to prior study.     Procalcitonin: N/a   WBC: 8.42                             03/14/2024     Code Status Level 3 DN     Previous MBS: 06/22/2023 completed @ [facility]   Oral stage:  Pt presented with minimal oral stage dysphagia.  Mastication was intentionally prolonged, but grossly  effective with materials administered today. Bolus formation and transfer were functional.  Oral control appeared adequate with no gross premature spillage over the base of tongue.   Pharyngeal stage:  Pt presented with mild pharyngeal dysphagia.   Velar elevation noted. Swallowing initiation was mild-moderately delayed, with posterior spillage to vallecular and pyriform sinus.  Mild-moderately reduced laryngeal rise and anterior hyoid excursion noted. Airway entrance closure appeared reduced, specifically with successive thin liquids via straw and reduced epiglottic inversion. Tongue base retraction and pharyngeal constriction appeared minimally reduced. PES opening was adequate.  Management of food/liquid/barium tablet follows:   Transient laryngeal penetration occurred with thin liquids intermittently and shivani aspiration occurred with sequential thin via straw.   Strategies and Efficacy: small sips minimized pharyngeal retention, secondary swallow improved removal of pharyngeal retention  Aspiration Response and Efficacy:  No cough response noted to aspiration.  Esophageal stage:  Brief view of esophagus was completed after administration of the barium tablet.  Esophageal phase unremarkable.  Assessment Summary:    Pt presents with mild oropharyngeal dysphagia characterized by swallow initiation delay, reduced laryngeal excursion, reduced epiglottic inversion and airway closure, as well as reduced TBR and pharyngeal constriction. Use of small sips minimizes risk of asp/pen and pharyngeal retention, while use of secondary swallow improves pharyngeal clearance with large sips. Silent aspiration occurred with sequential sips of thin via straw.  Note: Images are available for review in PACS as desired  Recommendations:   Recommended Diet:  regular diet and thin liquids   Recommended Form of Medications: whole with liquid and whole with puree   Aspiration precautions and compensatory swallowing strategies: small  "bites/sips, no straws and secondary swallow  Consider referral to:  None at this time  SLP Dysphagia therapy recommended: yes for therapeutic strengthening exercises    Results Reviewed with: patient and family   Pt/Family Education: VBS findings and recommendations immediately reviewed w/ pt w/ use of images to aid in understanding. Education provided on strategies to optimize swallow safety, including the importance of oral care and s/s aspiration to monitor for and notify medical team of should they arise. Pt verbalized understanding and denied questions at this time.    Patient's goal: \" I known speech\"    Did the pt report pain? No   If yes, was nursing notified/was it addressed? N/a     Reason for consult:  R/o aspiration  Determine safest and least restrictive diet  poor intake  weight loss   h/o dysphagia       Precautions:  N/a      Food Allergies: No known    Current Diet: Regular and thin    Premorbid diet: Dysphagia 2 mech soft and thin    O2 requirement: 3 liters NC    Social/Prior living Lives with spouse    Voice/Speech: WNL, [place]    Follows commands: Basic    Cognitive status: Alert     Oral [facility] exam:  Dentition:Upper and lower dentures lose fitting   Lips (VII):WNL  Tongue (XII):midline   Mandible (V):adequate ROM   Face/oral sensation (V):WNL  Velum (X):WNL    Items administered:  Soft solids  Oral stage:  Lip closure:WNL  Mastication: DNT   Bolus formation:WNL  Bolus control:WNL  Transfer:WNL    Pharyngeal stage:  Swallow promptness:prompt  Laryngeal rise:adequate   No overt s/s aspiration    Esophageal stage:  No s/s reported  H/o GERD    Results d/w:  Pt, INOCENTE           "

## 2024-03-14 NOTE — NURSING NOTE
Pt assisted HHA x1 to standing scale for weight. Denies pain. Pt declined to sit in recliner for breakfast, wishes to sit at edge of bed. Bed alarm on at this time, call bell within reach. IVF dc per order.

## 2024-03-14 NOTE — DISCHARGE INSTR - DIET
Video Swallow Study        Patient Name: Seun Alva  Today's Date: 3/14/2024  Summary:  Images are on PACS for review.      Oral Phase :  Pt presented with mild/mod oral dysphagia. He does have ill fitting dentures however provided fixident able to place. Mastication was mod prolonged. Bolus control and formation were      Pharyngeal Phase :  Pt presented with mild pharyngeal dysphagia. Swallow initiation was prompt with liquids and mildly delayed with solids.  Premature spill occurred to the valleculae with solid trials. Hyoid elevation and laryngeal excursion was inconsistently WFL vs mildly reduced. Pharyngeal constriction was mildly reduced. Tip of epiglottis did not consistently invert due to osteophyte C2-C3.  Trace vallecular retention occurred with thin liquids and mild with solids trials. Brief pill stasis occurred with 13 mm pill trial with thin liquids. Provided with successive sips of thin, nectar, and x1 tsp puree pill did not clear. Provided additional x1 tsp of puree with verbal cue to hard swallow pt able to clear pill .Trace pyriform retention observed with all consistencies administered. Trace penetration occurred with thin liquids trials. No overt aspiration observed throughout study. Pt inconsistently independently and when provided verbal cues initiated secondary swallow and effortful swallow to clear vallecular retention.      Per Esophageal screen   Pt did have slow motility of solids throughout the esophagus. Stasis occurred with 13 mm pill in distal esophagus. Provided with additional sips of thin, nectar, and x1 tsp puree pill did not clear.         Recommendations:  Diet: Dysphagia 2 mech soft   Liquids: thin   Meds:crush w/ puree  Strategies:slow rate, alternate liquids with solids, double swallow, and swallow prior to additional po.   Upright position  F/u ST tx: yes  Therapy Prognosis: fair   Prognosis considerations:age, medical status, pt participation   Intermittent  Supervision  Aspiration Precautions  Reflux Precautions  Consider consult with: GI   Results reviewed with: pt, nursing,CRNP, dietician  Aspiration precautions posted.  If a dedicated assessment of the esophagus is desired, consider esophagram/barium swallow or EGD.     Pt is 78 year old male. He has been reporting recent weight loss. Pt reported that he has difficulty chewing and will cut up his food very fine. He stated he will eat so much than will get tired and give the rest to the dogs. He reported to just starting to utilize a supplement at home. Pt also reported utilizing nectar thick liquids through majority of day however will consume some thin liquids. He prefers to take his medications whole w/ thin liquids but stated he does have intermittent globus sensation when taking them.

## 2024-03-14 NOTE — PLAN OF CARE
Problem: PAIN - ADULT  Goal: Verbalizes/displays adequate comfort level or baseline comfort level  Description: Interventions:  - Encourage patient to monitor pain and request assistance  - Assess pain using appropriate pain scale  - Administer analgesics based on type and severity of pain and evaluate response  - Implement non-pharmacological measures as appropriate and evaluate response  - Consider cultural and social influences on pain and pain management  - Notify physician/advanced practitioner if interventions unsuccessful or patient reports new pain  3/14/2024 1227 by Afshan Mike RN  Outcome: Progressing  3/14/2024 1227 by Afshan Mike RN  Outcome: Progressing   Pt denies pain at this time

## 2024-03-14 NOTE — ASSESSMENT & PLAN NOTE
Suspect pre-renal azotemia however patient appears to be in volume overloaded state. The patient reports using furosemide 40 mg twice daily for the past month due to increasing lower extremity edema.  CT of abdomen and pelvis-simple renal cyst.  No hydronephrosis.  Received gentle IV fluids initially.   Consult nephrology, input appreciated   Okay to resume diuretic for volume overload- furosemide 40 mg IV BID    Urinary retention protocol  Nephrotoxins and hypotension  Monitor renal function close. Follow up with urine studies.

## 2024-03-14 NOTE — ASSESSMENT & PLAN NOTE
Wt Readings from Last 3 Encounters:   03/14/24 73.4 kg (161 lb 13.1 oz)   03/11/24 73.4 kg (161 lb 12.8 oz)   03/04/24 68 kg (150 lb)     Acute on chronic CHF  Decompensated but with lower extremity edema, fluid overload, pleural effusion    CT a/p-persistent moderate right pleural effusion.  Echocardiogram 8/18/23: LVEF of 35%.  BNP >4700  IR thoracentesis (3/14) 600 ml of pleural effusion removal   Continue with medical management   Diuretics per nephrology   Low sodium diet, daily weight, I&O

## 2024-03-14 NOTE — CONSULTS
CONSULTATION-NEPHROLOGY   Seun Alva 78 y.o. male MRN: [id number]  Unit/Bed#: -01 Encounter: [id number]        Assessment and Plan:    CHEYANNE on CKD, POA.   Admission Cr 2.3mg/dL. Cr 1.4 on 3/4.   Cr 2.2 stable.  Etiology prerenal vs ATN from fluid/BP shifts. Urine osm 260 can be in keeping with diuretic use and low urine sodium 21 suggestive of prerenal state.  Renal imaging: CT abd/pelvis no hydro    Patient appears hypervolemic with effusion,sob and leg edema. Accept permissive azotemia to keep out of CHF.   ml/24 hr.    Recommend:  Avoid further IVF with concern for CHF. Increase midodrine to allow diurese and optimize MAP>65. Start IV lasix 40mg BID and follow volume status/BP/ Cr trends closely.  Follow I/O.   Low sodium diet.    2. Right pleural effusion  Sp thoracentesis 600ml right side 3/14.    3. Acute on chronic CHF   Weight 152 2/20.Follow I/O and daily standing weights. Thoracentesis right pleural effusion.Dietary restrictions.  Restart furosemide 40mg BID and follow trends closely.    4. Chronic respriratory failure   Chronically maintained on 2-3 L NC    5. Chronic hypotension  Increase midodrine to 5 mg Q8.    6. CKD3  Baseline previously 1.0. Cr in 1.3-1.6 range in past several months.  Etiology due to age related egfr loss/vascular disease/CRS.    HPI:    Seun Alva is a 78 y.o. male with hx CKD3, HTN COPD presented to ED with CHEYANNE at discretion of PCP. Patient had been on furosemide 40mg BID over over a month due to ongoing leg swelling. Cr baseline previously 1.0 but has been in 1. 3-1.6 more recently since diuretic escalation   Cr 1.7 2/26 and 1.4 3/4. Cr 2.3 3/12 and 2.2 today.  Patient follows with cardiology for systolic CHF. BP has been on  hypotensive side requiring midodrine 2.5mg TID. Patient additionally on jardiance 10mg/day.  Patient seen by nephrology service in 5/ 2023.   CT chest showed persistent moderate pleural effusion with plan for thoracentesis.   Patient  started on continuous IVF with NSS at 75/ml/hr which has been DC.    Reason for Consult: CHEYANNE    Review of Systems:    A complete 10-point review of systems was performed. Aside from what was mentioned in the HPI, it is otherwise negative.      Historical Information   Past Medical History:   Diagnosis Date    Abnormal ECG 2021 OR 2022    Alcoholism (HCC) 1984    sober 38 years    Arthritis 2008    beginning    Bilateral carotid artery stenosis     Callus     Cancer (HCC)     skin    Chronic diastolic heart failure (HCC)     Chronic ischemic heart disease     Chronic kidney disease     stage 3    Colon polyp     COPD (chronic obstructive pulmonary disease) (HCC)     Coronary artery disease     hx stents, MI, PCI    COVID 11/2021    COVID 12/01/2023    CPAP (continuous positive airway pressure) dependence     Disease of thyroid gland 2017    nodules    Emphysema of lung (HCC) 1995    started    Hearing loss     History of transfusion 1995    Hyperlipidemia     Hypertension     Intracranial aneurysm 04/25/2023    Lung nodule 2023    x rays    MI (myocardial infarction) ([facility]) 1995    Myocardial infarction ([facility]) 1995    Pneumonia     Pneumothorax 02/20/2023    collapsed lung    Prostate cancer ([facility])     RSV (respiratory syncytial virus infection) 10/2022    S/P carotid endarterectomy     Shortness of breath     O2 2 l/nc PRN    Sleep apnea     Sleep apnea, obstructive     Stented coronary artery      Past Surgical History:   Procedure Laterality Date    APPENDECTOMY      CARDIAC CATHETERIZATION  03/18/2021    left main with no significant disease, proximal LAD with 10% stenosis at the site of prior stent, left circumflex artery with minimal luminal irregularities mid RCA with 50% stenosis at site of prior stent and PL segment with distal disease supplied by collaterals from the distal circumflex with no significant CAD requiring revascularization at that time.    CATARACT EXTRACTION, BILATERAL Bilateral     COLONOSCOPY       CORONARY ANGIOPLASTY WITH STENT PLACEMENT      RCA    CORONARY ANGIOPLASTY WITH STENT PLACEMENT      RCA    CORONARY ANGIOPLASTY WITH STENT PLACEMENT      LAD    EYE SURGERY      VT BX PROSTATE STRTCTC SATURATION SAMPLING IMG GID N/A 2022    Procedure: TRANSPERINEAL MRI FUSION  BIOPSY PROSTATE;  Surgeon: Mele Patel MD;  Location: [facility];  Service: Urology    VT NEUROPLASTY &/TRANSPOS MEDIAN NRV CARPAL TUNNE Left 2022    Procedure: RELEASE CARPAL TUNNEL;  Surgeon: Matty Mcgowan MD;  Location[facility];  Service: Orthopedics    VT NEUROPLASTY &/TRANSPOSITION ULNAR NERVE ELBOW Left 2022    Procedure: RELEASE CUBITAL TUNNEL;  Surgeon: Matty Mcgowan MD;  Location[facility];  Service: Orthopedics    VT NEUROPLASTY &/TRANSPOSITION ULNAR NERVE WRIST Left 2022    Procedure: GUYON'S CANAL RELEASE;  Surgeon: Matty Mcgowan MD;  Location: [facility];  Service: Orthopedics    SKIN CANCER EXCISION  2012    chin-per pt, basal cell  also right ankle    TONSILLECTOMY  no     Social History   Social History     Substance and Sexual Activity   Alcohol Use Not Currently     Social History     Substance and Sexual Activity   Drug Use Never     Social History     Tobacco Use   Smoking Status Former    Current packs/day: 0.00    Average packs/day: 2.0 packs/day for 35.5 years (71.0 ttl pk-yrs)    Types: Cigarettes    Start date: 1960    Quit date: 1995    Years since quittin.7   Smokeless Tobacco Never   Tobacco Comments    stopped smoking the day i had a heart attack       Family History:   Family History   Problem Relation Age of Onset    Heart attack Mother     Dementia Mother     Heart failure Mother          2006    Heart disease Mother         heart attacks (2)    No Known Problems Father        Medications:  Pertinent medications were reviewed  Current Facility-Administered Medications   Medication Dose Route Frequency Provider Last Rate    ALPRAZolam   "0.25 mg Oral HS PRN Tricia Galdamez, CRNP      aspirin  81 mg Oral HS Tricia Galdamez, CRNP      budesonide  0.5 mg Nebulization BID Tricia Denice, CRNP      cholecalciferol  1,000 Units Oral Daily Tricia Denice, CRNP      dextromethorphan-guaiFENesin  5 mL Oral TID PRN Tricia Denice, CRNP      famotidine  10 mg Oral HS Triciarandall Galdamez, CRNP      fluticasone  1 spray Nasal BID Tricia Denice, CRNP      formoterol  20 mcg Nebulization BID Tricia Denice, CRNP      furosemide  40 mg Intravenous BID (diuretic) Tricia Galdamez, CRNP      gabapentin  100 mg Oral HS Tricia Galdamez, KELLENP      heparin (porcine)  5,000 Units Subcutaneous Q8H SVETLANA Tricia Galdamez, CRNP      HYDROcodone-acetaminophen  1 tablet Oral Q6H PRN Tricia Galdamez, KELLENP      ipratropium  0.5 mg Nebulization TID Hyacinth Espana MD      levalbuterol  1.25 mg Nebulization TID Hyacinth Espana MD      lidocaine  2 patch Topical Daily Tricia Deniec, INOCENTE      methocarbamol  500 mg Oral TID PRN Tricia Galdamez, INOCENTE      metoprolol succinate  12.5 mg Oral Daily Tricia Galdamez, KELLENP      midodrine  5 mg Oral TID AC Tricia Galdamez, CRNP      pantoprazole  20 mg Oral Early Morning Tricia Galdamez, KELLENP      saliva substitute  1 spray Mouth/Throat Q4H PRN Tricia Galdamez, CRNP      sertraline  50 mg Oral Daily Tricia Denice, KELLENP      sodium chloride  1 spray Each Nare Q1H PRN Tricia Galdamez, KELLENP           Allergies   Allergen Reactions    Lisinopril Swelling and Cough    Tetanus Antitoxin Anaphylaxis    Tetanus Toxoid Anaphylaxis and Swelling         Vitals:   BP 98/65 (BP Location: Right arm)   Pulse 98   Temp 97.9 °F (36.6 °C) (Oral)   Resp 18   Ht 5' 8\" (1.727 m)   Wt 73.4 kg (161 lb 13.1 oz)   SpO2 99%   BMI 24.60 kg/m²   Body mass index is 24.6 kg/m².  SpO2: 99 %,   SpO2 Activity: At Rest,   O2 Device: Nasal cannula      Intake/Output Summary (Last 24 hours) at 3/14/2024 2947  Last data filed at " "3/14/2024 1649  Gross per 24 hour   Intake 1462.5 ml   Output 1550 ml   Net -87.5 ml     Invasive Devices       Peripheral Intravenous Line  Duration             Peripheral IV 03/13/24 Distal;Right;Upper;Ventral (anterior) Arm 1 day                    Physical Exam:  General: conscious, cooperative, in no acute distress  Eyes: conjunctivae pink, anicteric sclerae  ENT: lips and mucous membranes moist  Neck: supple, no JVD, no masses  Chest: decrease BS, on nasal cannula  CVS: distinct S1 & S2, normal rate, regular rhythm  Abdomen: soft, non-tender, non-distended, normoactive bowel sounds  Extremities: moderate BL LE edema  Skin: no rash  Neuro: awake, alert, oriented, [place]      Diagnostic Data:  Lab: I have personally reviewed pertinent lab results.,   CBC:  Results from last 7 days   Lab Units 03/14/24  0451   WBC Thousand/uL 8.42   HEMOGLOBIN g/dL 12.7   HEMATOCRIT % 41.7   PLATELETS Thousands/uL 164      CMP:   Lab Results   Component Value Date    SODIUM 137 03/14/2024    K 4.1 03/14/2024     03/14/2024    CO2 26 03/14/2024    BUN 36 (H) 03/14/2024    CREATININE 2.28 (H) 03/14/2024    CALCIUM 8.5 03/14/2024    AST 34 03/14/2024    ALT 65 (H) 03/14/2024    ALKPHOS 115 (H) 03/14/2024    EGFR 26 03/14/2024   ,   PT/INR: No results found for: \"PT\", \"INR\",   Magnesium: No components found for: \"MAG\",  Phosphorous: No results found for: \"PHOS\"    Microbiology:  @[facility],(urinecx:7)@        Debbie Hancock,     Portions of the record may have been created with voice recognition software. Occasional wrong word or \"sound a like\" substitutions may have occurred due to the inherent limitations of voice recognition software. Read the chart carefully and recognize, using context, where substitutions have occurred.      "

## 2024-03-14 NOTE — CASE MANAGEMENT
Case Management Assessment & Discharge Planning Note    Patient name Seun Alva  Location /-01 MRN [id number]  : 1945 Date 3/14/2024       Current Admission Date: 3/13/2024  Current Admission Diagnosis:CHEYANNE (acute kidney injury) ([facility]ly [facility])   Patient Active Problem List    Diagnosis Date Noted    CHEYANNE (acute kidney injury) ([facility]) 2024    Primary hypertension 2024    Abnormal CT scan 2024    Transaminitis 2024    Anxiety 2024    Platelets decreased ([facility]) 2024    Acute on chronic combined systolic (congestive) and diastolic (congestive) heart failure ([facility]) 2024    Left eye pain 2023    Intermittent constipation 2023    Chronic systolic congestive heart failure ([facility]) 10/22/2023    Erectile dysfunction 2023    Other disorders of refraction 2023    Occlusion and stenosis of unspecified carotid artery 2023    Dysphagia 2023    Congestive heart failure with left ventricular systolic dysfunction (LVSD) ([facility]) 2023    Intracranial aneurysm 2023    Allergic rhinitis, unspecified 2023    Chronic kidney disease, stage 3a ([facility]) 2023    Generalized muscle weakness 2023    Hypertensive heart and chronic kidney disease with heart failure and stage 1 through stage 4 chronic kidney disease, or unspecified chronic kidney disease ([facility]) 2023    Need for assistance with personal care 2023    Other abnormalities of gait and mobility 2023    Sudden idiopathic hearing loss, right ear 2023    Acute and chronic respiratory failure with hypoxia ([facility]) 2023    Elevated troponin 2023    Orthopnea 2023    Lower extremity edema 2023    Irregular heart rhythm 2023    Left leg pain 2023    Transient right leg weakness 2023    COPD, severe ([facility]) 2023    Sensorineural hearing loss, bilateral 10/19/2022    Chronic respiratory failure with hypoxia ([facility])  10/15/2022    Prostate cancer (HCC) 09/27/2022    Elevated MCV 09/08/2022    Cubital tunnel syndrome on left 07/22/2022    Carpal tunnel syndrome on left 07/22/2022    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    Incomplete bladder emptying 04/27/2022    Chronic bilateral low back pain without sciatica 04/18/2022    Mid back pain 04/18/2022    Thoracic radiculopathy 04/18/2022    Chronic pain syndrome 04/18/2022    Hoarseness or changing voice 04/14/2022    Chronic right-sided thoracic back pain 03/05/2022    Primary insomnia 03/02/2022    Right hip pain 01/20/2022    Abnormal nuclear stress test 03/18/2021    Costochondral chest pain 01/15/2021    COPD with acute exacerbation (HCC) 01/11/2021    Oxygen dependent 01/11/2021    S/P carotid endarterectomy 01/11/2021    Stented coronary artery 01/11/2021    Anisometropia 01/11/2021    Osteoarthritis of spinal facet joint 01/11/2021    Chronic ischemic heart disease 01/11/2021    Diverticular disease of colon 01/11/2021    Dry eye syndrome 01/11/2021    GERD (gastroesophageal reflux disease) 01/11/2021    Acute hearing loss, right 01/11/2021    Elevated PSA 01/11/2021    Lens replaced by other means 01/11/2021    Lumbar radiculopathy 01/11/2021    Multinodular goiter 01/11/2021    Nuclear senile cataract 01/11/2021    Occlusion and stenosis of carotid artery 01/11/2021    Osteoarthritis of hip 01/11/2021    Prediabetes 01/11/2021    LAMBERTO on CPAP 01/11/2021    Solitary pulmonary nodule 01/11/2021    Thyroid nodule 01/11/2021    Guyon syndrome, 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    Depression, recurrent (HCC)     Hyperlipidemia     Coronary artery disease involving native coronary artery of native heart without angina pectoris     Left carotid artery occlusion       LOS (days): 0  Geometric Mean LOS (GMLOS) (days):   Days to GMLOS:     OBJECTIVE:              Current admission status: Observation  Referral Reason: Other (D/C planning)    Preferred Pharmacy:   [id number] - [place] - [id number]  [id number]  [facility] [address]  Phone: [phone] Fax: [phone]    [facility] - [place], [place] - [address]  [address]  [facility] [address]  [facility] [address]  Phone: [phone] Fax: [phone]    Primary Care Provider: Sarahy Clifford DO    Primary Insurance: [facility] REP  Secondary Insurance:     ASSESSMENT:  Active Health Care Proxies       Artie Brittany [facility] Care Representative - Spouse   Primary Phone: [phone] (Mobile)                 Advance Directives  Does patient have a Health Care POA?: Yes  Does patient have Advance Directives?: Yes  Advance Directives: Living will  Primary Contact: Nedra Stein         Readmission Root Cause  30 Day Readmission: No    Patient Information  Admitted from:: Home  Mental Status: Alert  During Assessment patient was accompanied by: Not accompanied during assessment  Assessment information provided by:: Patient  Primary Caregiver: Family  Caregiver's Name:: Nedra Sarita  Caregiver's Relationship to Patient:: Family Member  Caregiver's Telephone Number:: [phone]  Support Systems: Spouse/significant other, Daughter, Family members  County of Residence: [place]  What [facility] do you live in?: [facility] entry access options. Select all that apply.: Ramp  Living Arrangements: Lives w/ Spouse/significant other, Other (Comment) (Lives with children, and granddaughter)  Is patient a ?: Yes  Is patient active with VA ([employer] [facility])?: Yes    Activities of Daily Living Prior to Admission  Functional Status: Assistance  Completes ADLs independently?: No  Level of ADL dependence: Assistance  Ambulates independently?: No  Level of ambulatory dependence: Assistance  Does patient use assisted devices?: Yes  Assisted Devices (DME) used: Bedside Commode, Straight Cane, Walker, Shower Chair  Does patient currently own DME?: Yes  What DME does the patient currently own?:  Bedside Commode, Shower Chair, Straight Cane, Walker  Does patient have a history of Outpatient Therapy (PT/OT)?: Yes  Does the patient have a history of Short-Term Rehab?: Yes  Does patient have a history of HHC?: Yes  Does patient currently have HHC?: No         Patient Information Continued  Income Source: Pension/correction  Does patient have prescription coverage?: Yes  Does patient receive dialysis treatments?: No         Means of Transportation  Means of Transport to [facility]:: Family transport      Social Determinants of Health (SDOH)      Flowsheet Row Most Recent Value   Housing Stability    In the last 12 months, was there a time when you were not able to pay the mortgage or rent on time? N   In the last 12 months, how many places have you lived? 1   In the last 12 months, was there a time when you did not have a steady place to sleep or slept in a shelter (including now)? N   Transportation Needs    In the past 12 months, has lack of transportation kept you from medical appointments or from getting medications? no   In the past 12 months, has lack of transportation kept you from meetings, work, or from getting things needed for daily living? No   Food Insecurity    Within the past 12 months, you worried that your food would run out before you got the money to buy more. Never true   Within the past 12 months, the food you bought just didn't last and you didn't have money to get more. Never true   Utilities    In the past 12 months has the electric, gas, oil, or water company threatened to shut off services in your home? No            DISCHARGE DETAILS:    Discharge planning discussed with:: Pt  Freedom of Choice: Yes     CM contacted family/caregiver?: Yes  Were Treatment Team discharge recommendations reviewed with patient/caregiver?: Yes  Did patient/caregiver verbalize understanding of patient care needs?: Yes       Contacts  Patient Contacts: Nedra Stein  Relationship to Patient:: Family  Contact  Method: Phone  Phone Number: [phone]  Reason/Outcome: Discharge Planning    Requested Home Health Care         Is the patient interested in HHC at discharge?: No         Other Referral/Resources/Interventions Provided:  Interventions: Other (Specify) (D/C planning)  Referral Comments: Pt and caregiver reported no needs at this time         Treatment Team Recommendation: Home  Discharge Destination Plan:: Home  Transport at Discharge : Family                                      Additional Comments: CM met with pt at bedside. Pt reported no needs at this time. CM contacted the pts caregiver to verify no needs, and transport.

## 2024-03-14 NOTE — BRIEF OP NOTE (RAD/CATH)
IR THORACENTESIS Procedure Note    PATIENT NAME: Seun Alva  : 1945  MRN: [id number]    Pre-op Diagnosis:   1. CHEYANNE (acute kidney injury) (HCC)    2. Abdominal pain      Post-op Diagnosis:   1. CHEYANNE (acute kidney injury) (HCC)    2. Abdominal pain        Provider:   Jason Moon PA-C    Estimated Blood Loss: none    Findings: R thoracentesis, 600cc clear yellow    Specimens: none    Complications:  none immediate    Anesthesia: local    Jason Moon PA-C     Date: 3/14/2024  Time: 11:53 AM

## 2024-03-14 NOTE — ASSESSMENT & PLAN NOTE
CT of abdomen and pelvis without biliary tree dilatation. Possible related congestive hepatopathy.   Improving.   Monitor LFTs closely   Hold statin for now

## 2024-03-14 NOTE — PROGRESS NOTES
[facility]  Progress Note  Name: Seun Alva I  MRN: [id number]  Unit/Bed#: -01 I Date of Admission: 3/13/2024   Date of Service: 3/14/2024 I Hospital Day: 0    Assessment/Plan   * CHEYANNE (acute kidney injury) (Formerly [facility])  Assessment & Plan  Suspect pre-renal azotemia however patient appears to be in volume overloaded state. The patient reports using furosemide 40 mg twice daily for the past month due to increasing lower extremity edema.  CT of abdomen and pelvis-simple renal cyst.  No hydronephrosis.  Received gentle IV fluids initially.   Consult nephrology, input appreciated   Okay to resume diuretic for volume overload- furosemide 40 mg IV BID    Urinary retention protocol  Nephrotoxins and hypotension  Monitor renal function close. Follow up with urine studies.      Congestive heart failure with left ventricular systolic dysfunction (LVSD) (Formerly [facility])  Assessment & Plan  Wt Readings from Last 3 Encounters:   03/14/24 73.4 kg (161 lb 13.1 oz)   03/11/24 73.4 kg (161 lb 12.8 oz)   03/04/24 68 kg (150 lb)     Acute on chronic CHF  Decompensated but with lower extremity edema, fluid overload, pleural effusion    CT a/p-persistent moderate right pleural effusion.  Echocardiogram 8/18/23: LVEF of 35%.  BNP >4700  IR thoracentesis (3/14) 600 ml of pleural effusion removal   Continue with medical management   Diuretics per nephrology   Low sodium diet, daily weight, I&O           Transaminitis  Assessment & Plan  CT of abdomen and pelvis without biliary tree dilatation. Possible related congestive hepatopathy.   Improving.   Monitor LFTs closely   Hold statin for now      Abnormal CT scan  Assessment & Plan  CT a/p shows:  Persistent moderate right pleural effusion.  IR input appreciated   3/14- thoracentesis with 600 ml pleural effusion removal   Overlying atelectasis with evidence of aspiration of hyperdense material.  Speech evaluation   Aspiration precautions     Primary  hypertension  Assessment & Plan  BP has been on the lower side  He was started on midodrine outpatient  Continue Toprol 12.5 mg daily with holding parameters  Monitor pressure closely and adjust medication as indicated    Chronic respiratory failure with hypoxia (HCC)  Assessment & Plan  History of COPD  Chronically wears 2-3L NC       LAMBERTO on CPAP  Assessment & Plan  CPAP at bedtime                  VTE Pharmacologic Prophylaxis: VTE Score: 5 High Risk (Score >/= 5) - Pharmacological DVT Prophylaxis Ordered: heparin. Sequential Compression Devices Ordered.    Mobility:   Basic Mobility Inpatient Raw Score: 17  JH-HLM Goal: 5: Stand one or more mins  JH-HLM Achieved: 5: Stand (1 or more minutes)  HLM Goal achieved. Continue to encourage appropriate mobility.    Patient Centered Rounds: I performed bedside rounds with nursing staff today.   Discussions with Specialists or Other Care Team Provider: nephrology, CM, speech, PT/OT     Education and Discussions with Family / Patient:  patient was updated.     Total Time Spent on Date of Encounter in care of patient: 27 mins. This time was spent on one or more of the following: performing physical exam; counseling and coordination of care; obtaining or reviewing history; documenting in the medical record; reviewing/ordering tests, medications or procedures; communicating with other healthcare professionals and discussing with patient's family/caregivers.    Current Length of Stay: 0 day(s)  Current Patient Status: Inpatient   Certification Statement: The patient will continue to require additional inpatient hospital stay due to CHEYANNE  Discharge Plan: Anticipate discharge in 24-48 hrs to home.    Code Status: Level 3 - DNAR and DNI    Subjective:   Patient was seen and examined. Denies any pain. He is feeling okay. Denies any dyspnea.     Objective:     Vitals:   Temp (24hrs), Av.6 °F (36.4 °C), Min:96.7 °F (35.9 °C), Max:98.1 °F (36.7 °C)    Temp:  [96.7 °F (35.9 °C)-98.1  °F (36.7 °C)] 98.1 °F (36.7 °C)  HR:  [46-98] 89  Resp:  [18] 18  BP: ()/(51-69) 96/62  SpO2:  [87 %-100 %] 98 %  Body mass index is 24.6 kg/m².     Input and Output Summary (last 24 hours):     Intake/Output Summary (Last 24 hours) at 3/14/2024 1259  Last data filed at 3/14/2024 1222  Gross per 24 hour   Intake 1222.5 ml   Output 1450 ml   Net -227.5 ml       Physical Exam:   Physical Exam  Vitals and nursing note reviewed.   Constitutional:       General: He is not in acute distress.     Appearance: Normal appearance.      Comments: Frail and chronically ill appearing    HENT:      Head: Normocephalic and atraumatic.      Right Ear: External ear normal.      Left Ear: External ear normal.      Nose: Nose normal.      Mouth/Throat:      Mouth: Mucous membranes are moist.      Pharynx: Oropharynx is clear.   Eyes:      General:         Right eye: No discharge.         Left eye: No discharge.      Extraocular Movements: Extraocular movements intact.      Pupils: Pupils are equal, round, and reactive to light.   Cardiovascular:      Rate and Rhythm: Normal rate and regular rhythm.      Pulses: Normal pulses.      Heart sounds: Normal heart sounds. No murmur heard.  Pulmonary:      Effort: Pulmonary effort is normal. No respiratory distress.      Breath sounds: Normal breath sounds. No wheezing or rales.      Comments: Very decreased on right lower lobe    Abdominal:      General: Bowel sounds are normal. There is no distension.      Palpations: Abdomen is soft. There is no mass.      Tenderness: There is no abdominal tenderness.   Musculoskeletal:         General: No swelling, tenderness or deformity. Normal range of motion.      Cervical back: Normal range of motion and neck supple. No rigidity.      Right lower leg: Edema present.      Left lower leg: Edema present.      Comments: Edema on hips      Skin:     General: Skin is warm and dry.      Capillary Refill: Capillary refill takes less than 2 seconds.       Coloration: Skin is not pale.      Findings: No erythema.   Neurological:      General: No focal deficit present.      Mental Status: He is alert and oriented to person, place, and time. Mental status is at baseline.   Psychiatric:         Mood and Affect: Mood normal.         Behavior: Behavior normal.         Thought Content: Thought content normal.         Judgment: Judgment normal.       Additional Data:     Labs:  Results from last 7 days   Lab Units 03/14/24  0451 03/13/24  1414   WBC Thousand/uL 8.42 9.84   HEMOGLOBIN g/dL 12.7 14.2   HEMATOCRIT % 41.7 46.9   PLATELETS Thousands/uL 164 177   NEUTROS PCT %  --  87*   LYMPHS PCT %  --  5*   MONOS PCT %  --  7   EOS PCT %  --  0     Results from last 7 days   Lab Units 03/14/24  0451   SODIUM mmol/L 137   POTASSIUM mmol/L 4.1   CHLORIDE mmol/L 101   CO2 mmol/L 26   BUN mg/dL 36*   CREATININE mg/dL 2.28*   ANION GAP mmol/L 10   CALCIUM mg/dL 8.5   ALBUMIN g/dL 3.4*   TOTAL BILIRUBIN mg/dL 0.64   ALK PHOS U/L 115*   ALT U/L 65*   AST U/L 34   GLUCOSE RANDOM mg/dL 127                       Lines/Drains:  Invasive Devices       Peripheral Intravenous Line  Duration             Peripheral IV 03/13/24 Distal;Right;Upper;Ventral (anterior) Arm <1 day                          Imaging: Reviewed radiology reports from this admission including: chest CT scan    Recent Cultures (last 7 days):         Last 24 Hours Medication List:   Current Facility-Administered Medications   Medication Dose Route Frequency Provider Last Rate    ALPRAZolam  0.25 mg Oral HS PRN KELLE AndreaNP      aspirin  81 mg Oral HS KELLE AndreaNP      budesonide  0.5 mg Nebulization BID INOCENTE Andrea      cholecalciferol  1,000 Units Oral Daily INOCENTE Andrea      dextromethorphan-guaiFENesin  5 mL Oral TID PRN Tricia KELLE GaldamezNP      famotidine  10 mg Oral HS Tricia KELLE GaldamezNP      fluticasone  1 spray Nasal BID INOCENTE Andrea      formoterol  20 mcg  Nebulization BID INOCENTE Andrea      furosemide  40 mg Intravenous BID (diuretic) INOCENTE Andrea      gabapentin  100 mg Oral HS INOCENTE Andrea      heparin (porcine)  5,000 Units Subcutaneous Q8H SVETLANA INOCENTE Andrea      HYDROcodone-acetaminophen  1 tablet Oral Q6H PRN INOCENTE Andrea      ipratropium  0.5 mg Nebulization TID Hyacinth Espana MD      levalbuterol  1.25 mg Nebulization TID Hyacinth Espana MD      lidocaine  2 patch Topical Daily INOCNETE Andrea      methocarbamol  500 mg Oral TID PRN INOCENTE Andrea      metoprolol succinate  12.5 mg Oral Daily INOCENTE Andrea      midodrine  2.5 mg Oral TID AC INOCENTE Andrea      pantoprazole  20 mg Oral Early Morning INOCENTE Andrea      saliva substitute  1 spray Mouth/Throat Q4H PRN INOCENTE Andrea      sertraline  50 mg Oral Daily INOCENTE Andrea      sodium chloride  1 spray Each Nare Q1H PRN INOCENTE Andrea          Today, Patient Was Seen By: INOCENTE Andrea    **Please Note: This note may have been constructed using a voice recognition system.**

## 2024-03-14 NOTE — SEDATION DOCUMENTATION
Thoracentesis completed by Burak Moon PA-C. 400 cc clear yellow fluid. Pt tolerated well. Band aid applied to site.

## 2024-03-14 NOTE — ASSESSMENT & PLAN NOTE
BP has been on the lower side  He was started on midodrine outpatient  Continue Toprol 12.5 mg daily with holding parameters  Monitor pressure closely and adjust medication as indicated

## 2024-03-14 NOTE — NURSING NOTE
FELL ASLEEP WITH CPAP MAINTAINED AROUND 0300. NO DISTRESS AWAKENED AND DENIES KNEE PAIN. CALL MENDOZA IS NEAR AND BED ALARM ON

## 2024-03-14 NOTE — MALNUTRITION/BMI
This medical record reflects one or more clinical indicators suggestive of malnutrition and/or morbid obesity.    Malnutrition Findings:   Adult Malnutrition type: Acute illness  Adult Degree of Malnutrition: Other severe protein calorie malnutrition  Malnutrition Characteristics: Muscle loss, Inadequate energy, Weight loss                  360 Statement: Pt exhibits wevere malnutrition as evidenced by 12% wt loss x 6 months due to inadequate kcal intakes per pt to be treated with dysphagia diet and supplements.    BMI Findings:           Body mass index is 24.6 kg/m².     See full Nutrition note dated 3/14/2024 in flow sheet for additional details.

## 2024-03-14 NOTE — DISCHARGE INSTRUCTIONS
[facility]  Interventional Radiology  [phone]    Thoracentesis   WHAT YOU NEED TO KNOW:   A thoracentesis is a procedure to remove extra fluid or air from between your lungs and your inner chest wall. Air or fluid buildup may make it hard for you to breathe. A thoracentesis allows your lungs to expand fully so you can breathe more easily.    DISCHARGE INSTRUCTIONS:     Small amount of shoulder pain and bloody sputum is normal after a Thoracentesis.     Rest:  Rest when you feel it is needed. Slowly start to do more each day. Return to your daily activities as directed.     Resume your normal diet. Small sips of flat soda will help mild nausea.    Do not smoke:  If you smoke, it is never too late to quit. Ask for information about how to stop smoking if you need help.    Contact Interventional Radiology at [phone] ([facility] PATIENTS: Contact Interventional Radiology at [phone]) ([facility] PATIENTS: Contact Interventional Radiology at [phone]) if:   You have a fever.    Your puncture site is red, warm, swollen, or draining pus.    You have questions or concerns about your procedure, medicine, or care.    Seek care immediately or call 911 if:   Severe chest pain with inspiration and shortness of breath    Large amounts of blood in your sputum    Follow up with your healthcare provider as directed.

## 2024-03-14 NOTE — PROCEDURES
Video Swallow Study      Patient Name: Seun Alva  Today's Date: 3/14/2024        Past Medical History  Past Medical History:   Diagnosis Date    Abnormal ECG 2021 OR 2022    Alcoholism (HCC) 1984    sober 38 years    Arthritis 2008    beginning    Bilateral carotid artery stenosis     Callus     Cancer (HCC)     skin    Chronic diastolic heart failure (HCC)     Chronic ischemic heart disease     Chronic kidney disease     stage 3    Colon polyp     COPD (chronic obstructive pulmonary disease) (HCC)     Coronary artery disease     hx stents, MI, PCI    COVID 11/2021    COVID 12/01/2023    CPAP (continuous positive airway pressure) dependence     Disease of thyroid gland 2017    nodules    Emphysema of lung (HCC) 1995    started    Hearing loss     History of transfusion 1995    Hyperlipidemia     Hypertension     Intracranial aneurysm 04/25/2023    Lung nodule 2023    x rays    MI (myocardial infarction) ([facility]) 1995    Myocardial infarction ([facility]) 1995    Pneumonia     Pneumothorax 02/20/2023    collapsed lung    Prostate cancer ([facility])     RSV (respiratory syncytial virus infection) 10/2022    S/P carotid endarterectomy     Shortness of breath     O2 2 l/nc PRN    Sleep apnea     Sleep apnea, obstructive     Stented coronary artery         Past Surgical History  Past Surgical History:   Procedure Laterality Date    APPENDECTOMY      CARDIAC CATHETERIZATION  03/18/2021    left main with no significant disease, proximal LAD with 10% stenosis at the site of prior stent, left circumflex artery with minimal luminal irregularities mid RCA with 50% stenosis at site of prior stent and PL segment with distal disease supplied by collaterals from the distal circumflex with no significant CAD requiring revascularization at that time.    CATARACT EXTRACTION, BILATERAL Bilateral     COLONOSCOPY      CORONARY ANGIOPLASTY WITH STENT PLACEMENT  1999    RCA    CORONARY ANGIOPLASTY WITH STENT  PLACEMENT  2001    RCA    CORONARY ANGIOPLASTY WITH STENT PLACEMENT  2003    LAD    EYE SURGERY      FL BX PROSTATE STRTCTC SATURATION SAMPLING IMG GID N/A 09/13/2022    Procedure: TRANSPERINEAL MRI FUSION  BIOPSY PROSTATE;  Surgeon: Mele Patel MD;  Location: [facility];  Service: Urology    FL NEUROPLASTY &/TRANSPOS MEDIAN NRV CARPAL TUNNE Left 07/22/2022    Procedure: RELEASE CARPAL TUNNEL;  Surgeon: Matty Mcgowan MD;  Location: [facility];  Service: Orthopedics    FL NEUROPLASTY &/TRANSPOSITION ULNAR NERVE ELBOW Left 07/22/2022    Procedure: RELEASE CUBITAL TUNNEL;  Surgeon: Matty Mcgowan MD;  Location: [facility];  Service: Orthopedics    FL NEUROPLASTY &/TRANSPOSITION ULNAR NERVE WRIST Left 07/22/2022    Procedure: GUYON'S CANAL RELEASE;  Surgeon: Matty Mcgowan MD;  Location: [facility];  Service: Orthopedics    SKIN CANCER EXCISION  2012    chin-per pt, basal cell  also right ankle    TONSILLECTOMY  no         Summary:  Images are on PACS for review.     Oral Phase :  Pt presented with mild/mod oral dysphagia. He does have ill fitting dentures however provided fixident able to place. Mastication was mod prolonged. Bolus control and formation were     Pharyngeal Phase :  Pt presented with mild pharyngeal dysphagia. Swallow initiation was prompt with liquids and mildly delayed with solids.  Premature spill occurred to the valleculae with solid trials. Hyoid elevation and laryngeal excursion was inconsistently WFL vs mildly reduced. Pharyngeal constriction was mildly reduced. Tip of epiglottis did not consistently invert due to osteophyte C2-C3.  Trace vallecular retention occurred with thin liquids and mild with solids trials. Brief pill stasis occurred with 13 mm pill trial with thin liquids. Provided with successive sips of thin, nectar, and x1 tsp puree pill did not clear. Provided additional x1 tsp of puree with verbal cue to hard swallow pt able to clear pill .Trace pyriform retention  "observed with all consistencies administered. Trace penetration occurred with thin liquids trials. No overt aspiration observed throughout study. Pt inconsistently independently and when provided verbal cues initiated secondary swallow and effortful swallow to clear vallecular retention.     Per Esophageal screen   Pt did have slow motility of solids throughout the esophagus. Stasis occurred with 13 mm pill in distal esophagus. Provided with additional sips of thin, nectar, and x1 tsp puree pill did not clear.       Recommendations:  Diet: Dysphagia 2 mech soft   Liquids: thin   Meds:crush w/ puree  Strategies:slow rate, alternate liquids with solids, double swallow, and swallow prior to additional po.   Upright position  F/u ST tx: yes  Therapy Prognosis: fair   Prognosis considerations:age, medical status, pt participation   Intermittent Supervision  Aspiration Precautions  Reflux Precautions  Consider consult with: GI   Results reviewed with: pt, nursing,CRNP, dietician  Aspiration precautions posted.  If a dedicated assessment of the esophagus is desired, consider esophagram/barium swallow or EGD.    Pt is 78 year old male. He has been reporting recent weight loss. Pt reported that he has difficulty chewing and will cut up his food very fine. He stated he will eat so much than will get tired and give the rest to the dogs. He reported to just starting to utilize a supplement at home. Pt also reported utilizing nectar thick liquids through majority of day however will consume some thin liquids. He prefers to take his medications whole w/ thin liquids but stated he does have intermittent globus sensation when taking them.    Patient's goal: \" Here we are again \"    Goals:  Dysphagia LTG  -Patient will demonstrate safe and effective oral intake (without overt s/s significant oral/pharyngeal dysphagia including s/s penetration or aspiration) for the highest appropriate diet level.     Short Term Goals:  -Pt will " tolerate Dysphagia 2/mechanical soft diet and honey thin liquid with no significant s/s oral or pharyngeal dysphagia across 1-3 diagnostic session/s.    Re: Oral strength, ROM, coordination  -Patient will complete exercises to increase laryngeal rise, airway protection and pharyngeal constriction for improved swallow function with minimal/ no cues needed. .    Re: Mastication  -Patient will demonstrate adequate mastication to safely consume the  least restrictive diet with no verbal, visual or tactile cues needed.    Re: Compensatory Strategies    -Patient will utilize trained compensatory strategies (eg.  controlled bite/sip size, controlled rate, ”prep set”, multiple swallows, alternation of food with liquid, effortful swallow.) with 80% accuracy to eliminate overt s/s penetration/aspiration with the least restrictive food/liquid consistencie    -Patient will demonstrate independent use of recommended safe swallowing strategies during a clinician assessed meal across 1-3 diagnostic sessions.          H&P/Admit info, pertinent provider notes/procedures/studies/PMH:(copied and placed in this report)  Seun Alva is a 78 y.o. male with a PMH of congestive heart failure, chronic kidney disease, hypertension, COPD on chronic oxygen and CAD who presents with acute kidney injury.  The patient was seen by his PCP and recommended to come into the hospital due to CHEYANNE.  Patient states that he has been taking furosemide 40 mg twice daily over the past month due to worsening lower extremity edema.  His renal function has been worsening and subsequently PCP recommended to come to the ED.  The patient otherwise is feeling okay.  He had some poor appetite at home.  The patient denies any pain, palpitation, dyspnea worse than baseline, nausea, vomiting, or abdominal pain.     Special Studies   CT abdomen (03/13/2024) impression   No acute abdominopelvic abnormality.  No hydronephrosis bilaterally.  Persistent moderate right  pleural effusion.  Overlying atelectasis with evidence of aspiration of hyperdense material.  XR chest pa & lateral (03/14/2024) impression   Small right pleural effusion and adjacent passive atelectasis is similar to prior study    Code Status: Level 3 DNR/DNI     Previous VBS:  06/22/2023 completed @ [facility]   Oral stage:  Pt presented with minimal oral stage dysphagia.  Mastication was intentionally prolonged, but grossly effective with materials administered today. Bolus formation and transfer were functional.  Oral control appeared adequate with no gross premature spillage over the base of tongue.   Pharyngeal stage:  Pt presented with mild pharyngeal dysphagia.   Velar elevation noted. Swallowing initiation was mild-moderately delayed, with posterior spillage to vallecular and pyriform sinus.  Mild-moderately reduced laryngeal rise and anterior hyoid excursion noted. Airway entrance closure appeared reduced, specifically with successive thin liquids via straw and reduced epiglottic inversion. Tongue base retraction and pharyngeal constriction appeared minimally reduced. PES opening was adequate.  Management of food/liquid/barium tablet follows:   Transient laryngeal penetration occurred with thin liquids intermittently and shivani aspiration occurred with sequential thin via straw.   Strategies and Efficacy: small sips minimized pharyngeal retention, secondary swallow improved removal of pharyngeal retention  Aspiration Response and Efficacy:  No cough response noted to aspiration.  Esophageal stage:  Brief view of esophagus was completed after administration of the barium tablet.  Esophageal phase unremarkable.  Assessment Summary:    Pt presents with mild oropharyngeal dysphagia characterized by swallow initiation delay, reduced laryngeal excursion, reduced epiglottic inversion and airway closure, as well as reduced TBR and pharyngeal constriction. Use of small sips minimizes risk of asp/pen and  pharyngeal retention, while use of secondary swallow improves pharyngeal clearance with large sips. Silent aspiration occurred with sequential sips of thin via straw.  Note: Images are available for review in PACS as desired  Recommendations:   Recommended Diet:  regular diet and thin liquids   Recommended Form of Medications: whole with liquid and whole with puree   Aspiration precautions and compensatory swallowing strategies: small bites/sips, no straws and secondary swallow  Consider referral to:  None at this time  SLP Dysphagia therapy recommended: yes for therapeutic strengthening exercises    Results Reviewed with: patient and family   Pt/Family Education: VBS findings and recommendations immediately reviewed w/ pt w/ use of images to aid in understanding. Education provided on strategies to optimize swallow safety, including the importance of oral care and s/s aspiration to monitor for and notify medical team of should they arise. Pt verbalized understanding and denied questions at this time.    Precautions : n/a    Food allergies:  No known    Current diet:  Regular and thin    Premorbid diet:  Dysphagia 2 mech soft and thin    Dentition  Lose fitting upper denture, Lower denture   Oral Mech  WNL   O2 requirements:  3 NC   Vocal Quality/Speech WNL   Cognitive status:  Alert        Consistencies administered: Barium laden applesauce, nutri grain bar, sandwich bite, nectar thick, thin liquids, 13mm barium pill. Liquids were administered by cup and straw.     Pt was seated laterally at 90 degrees.   Pt  unable to be viewed in AP position due to transfer status.

## 2024-03-14 NOTE — NURSING NOTE
Awake and resp rx being given at bedside. Resp easy. 02 sat 99% hr 95/min. Resp easy. Ivf continue. Pleasant. Plus 2 ble edema. Call bell near and bed alarm on

## 2024-03-14 NOTE — UTILIZATION REVIEW
Initial Clinical Review    OBSERVATION 3/13 CHANGED TO INPATIENT ON 3/14 @ 1230 - continued SOB, need for IV diuresis.     Admission: Date/Time/Statement:   Admission Orders (From admission, onward)       Ordered        03/14/24 1230  Inpatient Admission  Once            03/13/24 1544  Place in Observation  Once                          Orders Placed This Encounter   Procedures    Inpatient Admission     Standing Status:   Standing     Number of Occurrences:   1     Order Specific Question:   Level of Care     Answer:   Med Surg [16]     Order Specific Question:   Estimated length of stay     Answer:   More than 2 Midnights     Order Specific Question:   Certification     Answer:   I certify that inpatient services are medically necessary for this patient for a duration of greater than two midnights. See H&P and MD Progress Notes for additional information about the patient's course of treatment.     ED Arrival Information       Expected   -    Arrival   3/13/2024 13:37    Acuity   Urgent              Means of arrival   Walk-In    Escorted by   Family Member    Service   Hospitalist    Admission type   Emergency              Arrival complaint   abnormal labs             Chief Complaint   Patient presents with    Abnormal Lab     Pt was called by pcp due to abnormal renal labs       Initial Presentation: 78 y.o. male presents to the ED from PCP office with CHEYANNE.  Has been on Lasix 40 mg BID for worsening BLE edema w/ worsening renal function, poor appetite.  PMH: CHF, CKD, HTN, COPD on home oxygen, CAD, LAMBERTO on CPAP.  In the ED pt was hypotensive. Labs - leukocytosis, elevated BUN/Cr, LFTs, BNP >4700.  Imaging aspirated material, mod R pleural effusion.  On exam generalized swelling.  Admitted to OBSERVATION status with CHEYANNE - Nephrology consultk hold IV Lasix for now, gentle IV fluids, urine studies, trend BMP.  Transaminitis - hold statin, trend LFTs, Abnormal CT scan for mod R pleural effusion - aspiration prec.  CHF  - low NA, daily wt.      Date: 3/14  CHANGED TO INPATIENT STATUS TODAY.   Resuming diuretic Lasix 40 mg IV BID.  Gentle IV fluids, BUN/CR downtrending.  On exam pt denies dsyspnea.  Very decreased breath sounds on R. BLE and hip edema.      3/14 Thoracentesis - R thoracentesis, 600cc clear yellow     3/14 Video Swallow eval -  Diet: Dysphagia 2 mech soft , Liquids: thin , Meds:crush w/ pure    3/14 Nephrology Consult - CHEYANNE on CKD is hypervolemic  with effusion, SOB, leg edema.  Hold further IV fluids, start IV Lasix BID.  Trend BMP.  On exam decreased breath sounds.  Mod BLE edema.  Continues on oxygen.      DATE: 3/15  Day 3: Has surpassed a 2nd midnight with active treatments and services.  Started back on IV Lasix today 40 mg BID.  Is consistently hypotensive, increased Midodrine to TiD.  Creat uptrending and severly volume overloaded on exam.  May need more aggressive diuresis.  On exam has SOB, dizziness and 3+ BLE edema.  WBC up to 10.47, BUN/CR 37/2.43.      ED Triage Vitals   Temperature Pulse Respirations Blood Pressure SpO2   03/13/24 1352 03/13/24 1351 03/13/24 1351 03/13/24 1351 03/13/24 1351   (!) 96.7 °F (35.9 °C) 63 18 (!) 89/52 97 %      Temp Source Heart Rate Source Patient Position - Orthostatic VS BP Location FiO2 (%)   03/13/24 1352 03/13/24 1351 03/13/24 1351 03/13/24 1351 --   Temporal Monitor Lying Right arm       Pain Score       03/13/24 1351       No Pain          Wt Readings from Last 1 Encounters:   03/15/24 73.8 kg (162 lb 11.2 oz)     Additional Vital Signs:   Date/Time Temp Pulse Resp BP MAP (mmHg) SpO2 Calculated FIO2 (%) - Nasal Cannula Nasal Cannula O2 Flow Rate (L/min) O2 Device Patient Position - Orthostatic VS   03/15/24 15:01:19 96.6 °F (35.9 °C) Abnormal  94 17 109/62 78 99 % 32 3 L/min Nasal cannula Sitting   03/15/24 1330 -- -- -- -- -- 98 % -- -- -- --   03/15/24 1306 -- -- -- -- -- 98 % 32 3 L/min Nasal cannula --   03/15/24 11:00:57 -- 96 -- 94/51 65 87 % Abnormal  -- --  -- --   03/15/24 10:59:14 -- 104 -- 83/60 Abnormal  68 85 % Abnormal  -- -- -- --   03/15/24 0909 -- -- -- -- -- 94 % 32 3 L/min Nasal cannula --   03/15/24 08:52:33 -- 92 -- 95/57 70 95 % -- -- -- --   03/15/24 0825 -- -- -- -- -- 92 % 32 3 L/min Nasal cannula --   03/15/24 07:21:54 96.8 °F (36 °C) Abnormal  89 18 100/61 74 97 % -- -- Nasal cannula Lying   03/15/24 0615 -- -- -- -- -- -- 32 3 L/min Nasal cannula --   03/14/24 2300 97.6 °F (36.4 °C) 97 20 98/55 -- 96 % 32 3 L/min -- Lying   03/14/24 2018 -- -- -- -- -- -- 32 3 L/min Nasal cannula --   03/14/24 2000 97.6 °F (36.4 °C) -- -- -- -- 97 % 32 3 L/min Nasal cannula --     03/14/24 1435 97.9 °F (36.6 °C) 98 18 98/65 72 99 % 32 3 L/min Nasal cannula Sitting   03/14/24 1353 -- -- -- -- -- 100 % -- -- -- --   03/14/24 13:41:32 -- 94 13 91/63 72 100 % -- -- -- --   03/14/24 1320 -- -- -- -- -- 100 % 32 3 L/min Nasal cannula --   03/14/24 10:50:15 -- 89 18 96/62 -- 98 % -- -- -- --   03/14/24 10:37:34 -- 46 Abnormal  18 96/59 -- 98 % -- -- -- --   03/14/24 0941 -- 98 -- 94/58 -- -- -- -- -- --   03/14/24 0803 -- -- -- -- -- 100 % 32 3 L/min Nasal cannula --   03/14/24 0802 98.1 °F (36.7 °C) 87 18 106/56 73 100 % -- -- -- Lying   03/14/24 0745 -- -- -- -- -- 95 % 32 3 L/min Nasal cannula --   03/14/24 0744 -- -- -- -- -- 95 % -- -- -- --   03/14/24 0100 -- -- -- -- -- 98 % -- -- CPAP --   03/13/24 2300 98 °F (36.7 °C) 92 18 101/60 -- 98 % 32 3 L/min Nasal cannula Lying   03/13/24 2006 -- -- -- -- -- -- 32 3 L/min Nasal cannula --   03/13/24 2000 -- -- -- -- -- 99 % 30 2.5 L/min Nasal cannula  --   O2 Device: current rx at 03/13/24 2000 03/13/24 1806 -- 92 -- -- -- 96 % -- -- -- --   03/13/24 1700 -- -- -- -- -- 97 % 30 2.5 L/min Nasal cannula --   03/13/24 16:35:58 97.4 °F (36.3 °C) Abnormal  92 18 102/69 80 87 % Abnormal  -- -- Nasal cannula Lying   03/13/24 1500 -- 95 18 100/66 73 97 % -- -- None (Room air) --   03/13/24 1355 -- -- -- 93/51 -- -- -- -- -- --        Pertinent Labs/Diagnostic Test Results:       FL barium swallow video w speech   Final Result by CELY GREGG (03/14 0512)      FL barium swallow video w speech   Final Result by Juliana Siu, SLP (03/14 0864)      CT abdomen pelvis without contrast   Final Result by Junior Huerta MD (03/13 6775)      No acute abdominopelvic abnormality.      No hydronephrosis bilaterally.      Persistent moderate right pleural effusion.      Overlying atelectasis with evidence of aspiration of hyperdense material.      IR IN-Patient Thoracentesis       R side - 600cc clear yellow          Results from last 7 days   Lab Units 03/15/24  0441 03/14/24  0451 03/13/24  1414 03/12/24  1405   WBC Thousand/uL 10.47* 8.42 9.84 10.85*   HEMOGLOBIN g/dL 13.2 12.7 14.2 13.9   HEMATOCRIT % 43.2 41.7 46.9 46.7   PLATELETS Thousands/uL 154 164 177 194   NEUTROS ABS Thousands/µL  --   --  8.61* 8.94*         Results from last 7 days   Lab Units 03/15/24  0441 03/14/24  0451 03/13/24  1414 03/12/24  1405   SODIUM mmol/L 137 137 137 138   POTASSIUM mmol/L 3.8 4.1 4.7 4.7   CHLORIDE mmol/L 100 101 99 99   CO2 mmol/L 25 26 28 29   ANION GAP mmol/L 12 10 10 10   BUN mg/dL 37* 36* 35* 37*   CREATININE mg/dL 2.43* 2.28* 2.30* 2.34*   EGFR ml/min/1.73sq m 24 26 26 25   CALCIUM mg/dL 8.4 8.5 9.2 9.2   MAGNESIUM mg/dL  --   --  2.3  --      Results from last 7 days   Lab Units 03/14/24 0451 03/13/24  1414 03/12/24  1405   AST U/L 34 44* 46*   ALT U/L 65* 82* 94*   ALK PHOS U/L 115* 136* 123*   TOTAL PROTEIN g/dL 5.3* 5.9* 6.0*   ALBUMIN g/dL 3.4* 3.9 3.9   TOTAL BILIRUBIN mg/dL 0.64 0.81 0.81         Results from last 7 days   Lab Units 03/15/24  0441 03/14/24  0451 03/13/24  1414   GLUCOSE RANDOM mg/dL 119 127 173*     Results from last 7 days   Lab Units 03/13/24  1414   BNP pg/mL >4,700*         Results from last 7 days   Lab Units 03/13/24  1414   LIPASE u/L 65             Results from last 7 days   Lab Units 03/13/24  1928    OSMO UR mmol/     Results from last 7 days   Lab Units 03/13/24  1927   CLARITY UA  Hazy   COLOR UA  Yellow   SPEC GRAV UA  1.015   PH UA  6.0   GLUCOSE UA mg/dl 2+*   KETONES UA mg/dl Negative   BLOOD UA  1+*   PROTEIN UA mg/dl 2+*   NITRITE UA  Negative   BILIRUBIN UA  Negative   UROBILINOGEN UA E.U./dl 0.2   LEUKOCYTES UA  Negative   WBC UA /hpf 0-1   RBC UA /hpf None Seen   BACTERIA UA /hpf Moderate*   EPITHELIAL CELLS WET PREP /hpf None Seen   SODIUM UR  21   CREATININE UR mg/dL 32.1  32.1   PROTEIN UR mg/dL 127   PROT/CREAT RATIO UR  3.96*     Results from last 7 days   Lab Units 03/14/24  1102   WBC FLUID /ul 210     ED Treatment:   Medication Administration from 03/13/2024 1337 to 03/13/2024 1628       None          Past Medical History:   Diagnosis Date    Abnormal ECG 2021 OR 2022    Alcoholism ([facility]) 1984    sober 38 years    Arthritis 2008    beginning    Bilateral carotid artery stenosis     Callus     Cancer ([facility])     skin    Chronic diastolic heart failure ([facility])     Chronic ischemic heart disease     Chronic kidney disease     stage 3    Colon polyp     COPD (chronic obstructive pulmonary disease) ([facility])     Coronary artery disease     hx stents, MI, PCI    COVID 11/2021    COVID 12/01/2023    CPAP (continuous positive airway pressure) dependence     Disease of thyroid gland 2017    nodules    Emphysema of lung ([facility]) 1995    started    Hearing loss     History of transfusion 1995    Hyperlipidemia     Hypertension     Intracranial aneurysm 04/25/2023    Lung nodule 2023    x rays    MI (myocardial infarction) ([facility]) 1995    Myocardial infarction ([facility]) 1995    Pneumonia     Pneumothorax 02/20/2023    collapsed lung    Prostate cancer ([facility])     RSV (respiratory syncytial virus infection) 10/2022    S/P carotid endarterectomy     Shortness of breath     O2 2 l/nc PRN    Sleep apnea     Sleep apnea, obstructive     Stented coronary artery      Present on Admission:   LAMBERTO on CPAP   Congestive heart  failure with left ventricular systolic dysfunction (LVSD) ([facility])   Chronic respiratory failure with hypoxia ([facility])      Admitting Diagnosis: Abdominal pain [R10.9]  Abnormal laboratory test [R89.9]  CHEYANNE (acute kidney injury) ([facility]) [N17.9]  Age/Sex: 78 y.o. male  Admission Orders:  Scheduled Medications:  aspirin, 81 mg, Oral, HS  budesonide, 0.5 mg, Nebulization, BID  cholecalciferol, 1,000 Units, Oral, Daily  famotidine, 10 mg, Oral, HS  fluticasone, 1 spray, Nasal, BID  formoterol, 20 mcg, Nebulization, BID  furosemide, 40 mg, Intravenous, BID (diuretic)  gabapentin, 100 mg, Oral, HS  heparin (porcine), 5,000 Units, Subcutaneous, Q8H SVETLANA  ipratropium, 0.5 mg, Nebulization, TID  levalbuterol, 1.25 mg, Nebulization, TID  lidocaine, 2 patch, Topical, Daily  metoprolol succinate, 12.5 mg, Oral, Daily  midodrine, 5 mg, Oral, TID AC  pantoprazole, 20 mg, Oral, Early Morning  sertraline, 50 mg, Oral, Daily      Continuous IV Infusions:  IV NSS @ 75 ml/hr - d/c 3/14     PRN Meds:  ALPRAZolam, 0.25 mg, Oral, HS PRN - x 1 3/14  dextromethorphan-guaiFENesin, 5 mL, Oral, TID PRN  HYDROcodone-acetaminophen, 1 tablet, Oral, Q6H PRN - x 1 3/14  methocarbamol, 500 mg, Oral, TID PRN  saliva substitute, 1 spray, Mouth/Throat, Q4H PRN  sodium chloride, 1 spray, Each Nare, Q1H PRN - x 1 3/14    Thoracentesis and send fluid for studies  Daily wt  OOB  Bladder scan q shift   Dysphagia diet, thin liquids  CPAP  IP CONSULT TO CASE MANAGEMENT  IP CONSULT TO NEPHROLOGY    Network Utilization Review Department  ATTENTION: Please call with any questions or concerns to [phone] and carefully listen to the prompts so that you are directed to the right person. All voicemails are confidential.   For Discharge needs, contact Care Management DC Support Team at [phone] opt. 2  Send all requests for admission clinical reviews, approved or denied determinations and any other requests to dedicated fax number below belonging to the campus where  the patient is receiving treatment. List of dedicated fax numbers for the Facilities:  FACILITY NAME UR FAX NUMBER   ADMISSION DENIALS (Administrative/Medical Necessity) [phone]   DISCHARGE SUPPORT TEAM (NETWORK) [phone]   PARENT CHILD HEALTH (Maternity/NICU/Pediatrics) [phone]   [facility] [phone]   [facility] [phone]   [facility] [phone]   [facility] [phone]   [facility] [phone]   [facility] [phone]   [facility] [phone]   [facility] [phone]   [facility] [phone]   [facility] [phone]   [facility] [phone]   [facility] [phone]          1

## 2024-03-14 NOTE — PROGRESS NOTES
OP RT CM received incoming ADT alert.  Patient currently hospitalized.  I will outreach once discharged to home.

## 2024-03-14 NOTE — ASSESSMENT & PLAN NOTE
CT a/p shows:  Persistent moderate right pleural effusion.  IR input appreciated   3/14- thoracentesis with 600 ml pleural effusion removal   Overlying atelectasis with evidence of aspiration of hyperdense material.  Speech evaluation   Aspiration precautions

## 2024-03-15 PROBLEM — T17.800A ASPIRATION INTO LOWER RESPIRATORY TRACT: Status: ACTIVE | Noted: 2024-03-13

## 2024-03-15 NOTE — CASE MANAGEMENT
Case Management Discharge Planning Note    Patient name Seun Alva  Location /-01 MRN [id number]  : 1945 Date 3/15/2024       Current Admission Date: 3/13/2024  Current Admission Diagnosis:CHEYANNE (acute kidney injury) ([facility])   Patient Active Problem List    Diagnosis Date Noted    Severe protein-calorie malnutrition ([facility]) 2024    CHEYANNE (acute kidney injury) ([facility]) 2024    Primary hypertension 2024    Abnormal CT scan 2024    Transaminitis 2024    Anxiety 2024    Platelets decreased ([facility]) 2024    Acute on chronic combined systolic (congestive) and diastolic (congestive) heart failure ([facility]) 2024    Left eye pain 2023    Intermittent constipation 2023    Chronic systolic congestive heart failure ([facility]) 10/22/2023    Erectile dysfunction 2023    Other disorders of refraction 2023    Occlusion and stenosis of unspecified carotid artery 2023    Dysphagia 2023    Congestive heart failure with left ventricular systolic dysfunction (LVSD) ([facility]) 2023    Intracranial aneurysm 2023    Allergic rhinitis, unspecified 2023    Chronic kidney disease, stage 3a ([facility]) 2023    Generalized muscle weakness 2023    Hypertensive heart and chronic kidney disease with heart failure and stage 1 through stage 4 chronic kidney disease, or unspecified chronic kidney disease ([facility]) 2023    Need for assistance with personal care 2023    Other abnormalities of gait and mobility 2023    Sudden idiopathic hearing loss, right ear 2023    Acute and chronic respiratory failure with hypoxia ([facility]) 2023    Elevated troponin 2023    Orthopnea 2023    Lower extremity edema 2023    Irregular heart rhythm 2023    Left leg pain 2023    Transient right leg weakness 2023    COPD, severe ([facility]) 2023    Sensorineural hearing loss, bilateral 10/19/2022    Chronic  respiratory failure with hypoxia (HCC) 10/15/2022    Prostate cancer (HCC) 09/27/2022    Elevated MCV 09/08/2022    Cubital tunnel syndrome on left 07/22/2022    Carpal tunnel syndrome on left 07/22/2022    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    Incomplete bladder emptying 04/27/2022    Chronic bilateral low back pain without sciatica 04/18/2022    Mid back pain 04/18/2022    Thoracic radiculopathy 04/18/2022    Chronic pain syndrome 04/18/2022    Hoarseness or changing voice 04/14/2022    Chronic right-sided thoracic back pain 03/05/2022    Primary insomnia 03/02/2022    Right hip pain 01/20/2022    Abnormal nuclear stress test 03/18/2021    Costochondral chest pain 01/15/2021    COPD with acute exacerbation (HCC) 01/11/2021    Oxygen dependent 01/11/2021    S/P carotid endarterectomy 01/11/2021    Stented coronary artery 01/11/2021    Anisometropia 01/11/2021    Osteoarthritis of spinal facet joint 01/11/2021    Chronic ischemic heart disease 01/11/2021    Diverticular disease of colon 01/11/2021    Dry eye syndrome 01/11/2021    GERD (gastroesophageal reflux disease) 01/11/2021    Acute hearing loss, right 01/11/2021    Elevated PSA 01/11/2021    Lens replaced by other means 01/11/2021    Lumbar radiculopathy 01/11/2021    Multinodular goiter 01/11/2021    Nuclear senile cataract 01/11/2021    Occlusion and stenosis of carotid artery 01/11/2021    Osteoarthritis of hip 01/11/2021    Prediabetes 01/11/2021    LAMBERTO on CPAP 01/11/2021    Solitary pulmonary nodule 01/11/2021    Thyroid nodule 01/11/2021    Guyon syndrome, 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  [address]  [place] [address]  Phone: [phone] Fax: [phone]    [facility] - [place], [place] - [address]  [address]  [address]  [facility] [address]  Phone: [phone] Fax: [phone]    Primary Care Provider: Sarahy Clifford DO    Primary Insurance: [facility] REP  Secondary Insurance:     DISCHARGE DETAILS:                                          Other Referral/Resources/Interventions Provided:  Interventions: HHC  Referral Comments: PT rec HHC. Pts daughter agreeable to ref being submitted for HHC. CM submitted ref for HHC via [facility].         Treatment Team Recommendation: Home with Home Health Care  Discharge Destination Plan:: Home with Home Health Care                                         Additional Comments: CM spoke with pt and family. Pt req  HHC only. CM will leave ref open as  HHC currently has staff shortage

## 2024-03-15 NOTE — ASSESSMENT & PLAN NOTE
Malnutrition Findings:   Adult Malnutrition type: Acute illness  Adult Degree of Malnutrition: Other severe protein calorie malnutrition  Malnutrition Characteristics: Muscle loss, Inadequate energy, Weight loss                  360 Statement: Pt exhibits wevere malnutrition as evidenced by 12% wt loss x 6 months due to inadequate kcal intakes per pt to be treated with dysphagia diet and supplements.    BMI Findings:           Body mass index is 24.74 kg/m².

## 2024-03-15 NOTE — ASSESSMENT & PLAN NOTE
CT of abdomen and pelvis without biliary tree dilatation. Possible related congestive hepatopathy. Improving  Monitor LFTs closely   Hold statin for now

## 2024-03-15 NOTE — ASSESSMENT & PLAN NOTE
Suspect pre-renal azotemia however patient appears to be in volume overloaded state. The patient reports using furosemide 40 mg twice daily for the past month due to increasing lower extremity edema  Initially received IV hydration  CT of abdomen and pelvis: No hydronephrosis   Nephrology recommendations appreciated: Continue furosemide 40 mg IV twice daily  Daily metabolic panel and trend creatinine  Avoid further nephrotoxins and hypotension  Monitor I&O

## 2024-03-15 NOTE — PHYSICAL THERAPY NOTE
Physical Therapy Evaluation   Time in: 1051  Time out: 1106  Total evaluation time: 15 minutes    Patient's Name: Seun Alva    Admitting Diagnosis  Abdominal pain [R10.9]  Abnormal laboratory test [R89.9]  CHEYANNE (acute kidney injury) ([facility]) [N17.9]    Problem List  Patient Active Problem List   Diagnosis    Hyperlipidemia    Coronary artery disease involving native coronary artery of native heart without angina pectoris    Left carotid artery occlusion    COPD with acute exacerbation ([facility])    Oxygen dependent    Depression, recurrent ([facility])    S/P carotid endarterectomy    Stented coronary artery    Anisometropia    Osteoarthritis of spinal facet joint    Chronic ischemic heart disease    Diverticular disease of colon    Dry eye syndrome    GERD (gastroesophageal reflux disease)    Acute hearing loss, right    Elevated PSA    Lens replaced by other means    Lumbar radiculopathy    Multinodular goiter    Nuclear senile cataract    Occlusion and stenosis of carotid artery    Osteoarthritis of hip    Prediabetes    LAMBERTO on CPAP    Solitary pulmonary nodule    Thyroid nodule    Guyon syndrome, left    Costochondral chest pain    Abnormal nuclear stress test    Right hip pain    Primary insomnia    Chronic right-sided thoracic back pain    Hoarseness or changing voice    Chronic bilateral low back pain without sciatica    Mid back pain    Thoracic radiculopathy    Chronic pain syndrome    Incomplete bladder emptying    Intercostal neuralgia    Cubital tunnel syndrome on left    Carpal tunnel syndrome on left    Elevated MCV    Prostate cancer (HCC)    Chronic respiratory failure with hypoxia (HCC)    Sensorineural hearing loss, bilateral    COPD, severe (HCC)    Left leg pain    Transient right leg weakness    Orthopnea    Lower extremity edema    Irregular heart rhythm    Elevated troponin    Intracranial aneurysm    Congestive heart failure with left ventricular systolic dysfunction (LVSD) (HCC)    Dysphagia     Erectile dysfunction    Allergic rhinitis, unspecified    Chronic kidney disease, stage 3a (HCC)    Generalized muscle weakness    Hypertensive heart and chronic kidney disease with heart failure and stage 1 through stage 4 chronic kidney disease, or unspecified chronic kidney disease (HCC)    Need for assistance with personal care    Other abnormalities of gait and mobility    Sudden idiopathic hearing loss, right ear    Acute and chronic respiratory failure with hypoxia (HCC)    Other disorders of refraction    Occlusion and stenosis of unspecified carotid artery    Chronic systolic congestive heart failure (HCC)    Left eye pain    Intermittent constipation    Acute on chronic combined systolic (congestive) and diastolic (congestive) heart failure (HCC)    Platelets decreased (HCC)    Anxiety    CHEYANNE (acute kidney injury) ([facility])    Primary hypertension    Abnormal CT scan    Transaminitis    Severe protein-calorie malnutrition (HCC)       Past Medical History  Past Medical History:   Diagnosis Date    Abnormal ECG 2021 OR 2022    Alcoholism ([facility]) 1984    sober 38 years    Arthritis 2008    beginning    Bilateral carotid artery stenosis     Callus     Cancer (HCC)     skin    Chronic diastolic heart failure (HCC)     Chronic ischemic heart disease     Chronic kidney disease     stage 3    Colon polyp     COPD (chronic obstructive pulmonary disease) ([facility])     Coronary artery disease     hx stents, MI, PCI    COVID 11/2021    COVID 12/01/2023    CPAP (continuous positive airway pressure) dependence     Disease of thyroid gland 2017    nodules    Emphysema of lung ([facility]) 1995    started    Hearing loss     History of transfusion 1995    Hyperlipidemia     Hypertension     Intracranial aneurysm 04/25/2023    Lung nodule 2023    x rays    MI (myocardial infarction) ([facility]) 1995    Myocardial infarction ([facility]) 1995    Pneumonia     Pneumothorax 02/20/2023    collapsed lung    Prostate cancer ([facility])     RSV (respiratory  syncytial virus infection) 10/2022    S/P carotid endarterectomy     Shortness of breath     O2 2 l/nc PRN    Sleep apnea     Sleep apnea, obstructive     Stented coronary artery        Past Surgical History  Past Surgical History:   Procedure Laterality Date    APPENDECTOMY      CARDIAC CATHETERIZATION  03/18/2021    left main with no significant disease, proximal LAD with 10% stenosis at the site of prior stent, left circumflex artery with minimal luminal irregularities mid RCA with 50% stenosis at site of prior stent and PL segment with distal disease supplied by collaterals from the distal circumflex with no significant CAD requiring revascularization at that time.    CATARACT EXTRACTION, BILATERAL Bilateral     COLONOSCOPY      CORONARY ANGIOPLASTY WITH STENT PLACEMENT  1999    RCA    CORONARY ANGIOPLASTY WITH STENT PLACEMENT  2001    RCA    CORONARY ANGIOPLASTY WITH STENT PLACEMENT  2003    LAD    EYE SURGERY      TN BX PROSTATE STRTCTC SATURATION SAMPLING IMG GID N/A 09/13/2022    Procedure: TRANSPERINEAL MRI FUSION  BIOPSY PROSTATE;  Surgeon: Mele Patel MD;  [facility]: [facility];  Service: Urology    TN NEUROPLASTY &/TRANSPOS MEDIAN NRV CARPAL TUNNE Left 07/22/2022    Procedure: RELEASE CARPAL TUNNEL;  Surgeon: Matty Mcgowan MD;  Location: [facility];  Service: Orthopedics    TN NEUROPLASTY &/TRANSPOSITION ULNAR NERVE ELBOW Left 07/22/2022    Procedure: RELEASE CUBITAL TUNNEL;  Surgeon: Matty Mcgowan MD;  [facility];  Service: Orthopedics    TN NEUROPLASTY &/TRANSPOSITION ULNAR NERVE WRIST Left 07/22/2022    Procedure: GUYON'S CANAL RELEASE;  Surgeon: Matty Mcgowan MD;  Location: [facility];  Service: Orthopedics    SKIN CANCER EXCISION  2012    chin-per pt, basal cell  also right ankle    TONSILLECTOMY  no       PT performed at least 2 patient identifiers during session: Name and wristband.       03/15/24 1100   PT Last Visit   PT Visit Date 03/15/24   Note Type   Note type  "Evaluation   Pain Assessment   Pain Assessment Tool 0-10   Pain Score No Pain   Restrictions/Precautions   Weight Bearing Precautions Per Order No   Other Precautions Aspiration;Chair Alarm;Bed Alarm;O2;Fall Risk;Hard of hearing  (4 L O2 via NC)   Home Living   Type of Home House   Home Layout Two level;Performs ADLs on one level;Able to live on main level with bedroom/bathroom;Stairs to enter with rails  (2 SATNAM)   Bathroom Shower/Tub Walk-in shower   Bathroom Toilet Standard   Bathroom Equipment Shower chair;Commode   Bathroom Accessibility Accessible   Home Equipment Walker;Cane  (baseline RW use)   Prior Function   Level of [place] Independent with ADLs;Independent with functional mobility;Needs assistance with IADLS   Lives With Spouse;Daughter;Family   Receives Help From Family   IADLs Independent with meal prep;Independent with medication management;Family/Friend/Other provides transportation   Falls in the last 6 months 5 to 10  (5 falls - pt reports d/t knees giving out)   Vocational Retired   General   Family/Caregiver Present No   Cognition   Arousal/Participation Alert   Orientation Level Oriented X4   Memory Decreased recall of precautions   Following Commands Follows one step commands without difficulty   Comments pt pleasant, agreeable to PT session   Subjective   Subjective \"I can go for a walk\"   RLE Assessment   RLE Assessment X   Strength RLE   RLE Overall Strength 4/5   LLE Assessment   LLE Assessment X   Strength LLE   LLE Overall Strength 4/5   Coordination   Movements are Fluid and Coordinated 0   Sensation WFL   Bed Mobility   Supine to Sit Unable to assess  (pt received OOB in recliner)   Transfers   Sit to Stand 5  Supervision   Additional items Assist x 1;Armrests;Increased time required   Stand to Sit 5  Supervision   Additional items Assist x 1;Armrests;Increased time required   Additional Comments BP L UE post ambulation = 83/60, BP R UE post amb = 94/51; pt with +lightheadedness " toward end of ambulation attempt, SpO2 dropped to 85% at lowest, recovered to 89% x 3 mins of seated rest   Ambulation/Elevation   Gait pattern Narrow CORDELL;Decreased foot clearance;Forward Flexion;Short stride   Gait Assistance 5  Supervision   Additional items Assist x 1;Verbal cues   Assistive Device Rolling walker   Distance 80 ft   Stair Management Assistance Not tested   Balance   Static Sitting Good   Dynamic Sitting Fair +   Static Standing Fair   Dynamic Standing Fair -   Ambulatory Fair -   Endurance Deficit   Endurance Deficit Yes   Activity Tolerance   Activity Tolerance Patient limited by fatigue   Medical Staff Made Aware coordination of care provided with OT Olive   Nurse Made Aware Yes, RN made aware of outcomes/recs   Assessment   Prognosis Fair   Problem List Decreased strength;Decreased endurance;Impaired balance;Decreased mobility;Impaired hearing   Assessment Pt is 78 y.o. male seen for high-complexity PT evaluation on 3/15/2024 s/p admit to [facility] on 3/13/2024 w/ CHEYANNE (acute kidney injury) (HCC). PT initially consulted to assess pt's functional mobility and d/c needs. Order placed for PT eval and tx, w/ up and OOB as tolerated order. PTA, At baseline pt (I) ADLs/IADLs, lives c family in 1 SH c 3 SATNAM. At time of eval, pt requires SUP for all phases of functional mobility, including amb x 24 ft with RW. Upon evaluation, pt presenting with impaired functional mobility d/t decreased strength, decreased endurance, impaired balance, decreased mobility, and impaired hearing. Pertinent PMHx and current co-morbidities affecting pt's physical performance at time of assessment include: CHEYANNE, CHF c L ventricular systolic dysfunction, transaminitis, HTN, chronic respiratory failure c hypoxia, h/o COPD, LAMBERTO on CPAP. Personal factors affecting pt at time of eval include: positive fall history, increased age. Objective measures performed on IE also reveal limitations: AM-PAC 6-Clicks: 19/24.  "Pt's clinical presentation is currently unstable/unpredictable seen in pt's presentation of abnormal lab value(s), need for input for task focus and mobility technique and ongoing medical assessment, fall risk, CHACON, progressive symptoms prior to hospitalization.  Overall, pt's rehab potential and prognosis to return to PLOF is fair as impacted by objective findings, warranting pt to receive further skilled PT interventions to address identified impairments, activity limitation(s), and participation restriction(s). Pt is to benefit from continued PT tx to address deficits as defined above and maximize level of functional independent mobility and consistency in order for pt to improve activity tolerance. From PT/mobility standpoint, recommendation at time of d/c would be level 3, minimal resource intensity PT services, pending pt's overall functional progress in order to facilitate return to PLOF and abilities.   Barriers to Discharge None   Goals   Patient Goals \"to return home\"   [facility] Expiration Date 03/25/24   Short Term Goal #1 1.)Patient will complete bed mobility modified I for decrease need for caregiver assistance, decrease burden of care. 2.) Patient will complete transfers modified I to decrease risk of falls, facilitate upright standing posture. 3.) BLE strength to greater than/equal to 4/5 gross musculature to increase ability to safely transfer, control descent to chair. 4.) Patient will exhibit increase dynamic standing to Good 4-5 minutes without LOB distant supervision to improve activity tolerance. 5.) Patient will exhibit increase dynamic ambulatory balance to Fair+  feet w/AD prn distant supervision to improve ability to mobilize to toilet, chair and decrease risk for additional medical complications. 6.) Patient will exhibit good self monitoring and ability to follow 2 step commands to increase complexity of tasks and resume ADL's without LOB.   PT Treatment Day 0   Plan "   Treatment/Interventions Functional transfer training;LE strengthening/ROM;Elevations;Therapeutic exercise;Endurance training;Patient/family training;Gait training;Spoke to nursing;Spoke to case management   PT Frequency 2-3x/wk   Discharge Recommendation   Rehab Resource Intensity Level, PT III (Minimum Resource Intensity)   Equipment Recommended   (continue RW use)   Additional Comments Pt's raw score on the [facility] Basic Mobility inpatient short form is 19, standardized score is 42.48. Patients at this level are likely to benefit from DC to home with home health care, however, please refer to therapist recommendation for safe DC planning.   [facility] Basic Mobility Inpatient   Turning in Flat Bed Without Bedrails 4   Lying on Back to Sitting on Edge of Flat Bed Without Bedrails 4   Moving Bed to Chair 3   Standing Up From Chair Using Arms 3   Walk in Room 3   Climb 3-5 Stairs With Railing 2   Basic Mobility Inpatient Raw Score 19   Basic Mobility Standardized Score 42.48   [facility] Highest Level Of Mobility   JH-HLM Goal 6: Walk 10 steps or more   JH-HLM Achieved 7: Walk 25 feet or more       Larisa Goins, PT, DPT

## 2024-03-15 NOTE — ASSESSMENT & PLAN NOTE
CT reveals overlying atelectasis with evidence of aspiration of hyperdense material  Speech evaluation appreciated: Modified diet  Aspiration precautions

## 2024-03-15 NOTE — ASSESSMENT & PLAN NOTE
Wt Readings from Last 3 Encounters:   03/15/24 73.8 kg (162 lb 11.2 oz)   03/11/24 73.4 kg (161 lb 12.8 oz)   03/04/24 68 kg (150 lb)     Acute on chronic CHF  Decompensated but with lower extremity edema, fluid overload, pleural effusion    CT AP 3/13/2024: With persistent moderate right pleural effusion.  Echocardiogram 8/18/23: LVEF of 35%.  BNP >4700  IR thoracentesis 3/14/2024 with 600 ml of pleural fluid removed  Continue Toprol succinate 12.5 mg p.o. daily  Continue diuretics per nephrology   Low sodium diet, daily weights, I&O

## 2024-03-15 NOTE — PROGRESS NOTES
[facility]  Progress Note  Name: Seun Alva I  MRN: [id number]  Unit/Bed#: -01 I Date of Admission: 3/13/2024   Date of Service: 3/15/2024 I Hospital Day: 1    Assessment/Plan     * CHEYANNE (acute kidney injury) ([facility])  Assessment & Plan  Suspect pre-renal azotemia however patient appears to be in volume overloaded state. The patient reports using furosemide 40 mg twice daily for the past month due to increasing lower extremity edema  Initially received IV hydration  CT of abdomen and pelvis: No hydronephrosis   Nephrology recommendations appreciated: Continue furosemide 40 mg IV twice daily  Daily metabolic panel and trend creatinine  Avoid further nephrotoxins and hypotension  Monitor I&O    Congestive heart failure with left ventricular systolic dysfunction (LVSD) ([facility])  Assessment & Plan  Wt Readings from Last 3 Encounters:   03/15/24 73.8 kg (162 lb 11.2 oz)   03/11/24 73.4 kg (161 lb 12.8 oz)   03/04/24 68 kg (150 lb)     Acute on chronic CHF  Decompensated but with lower extremity edema, fluid overload, pleural effusion    CT AP 3/13/2024: With persistent moderate right pleural effusion.  Echocardiogram 8/18/23: LVEF of 35%.  BNP >4700  IR thoracentesis 3/14/2024 with 600 ml of pleural fluid removed  Continue Toprol succinate 12.5 mg p.o. daily  Continue diuretics per nephrology   Low sodium diet, daily weights, I&O           Aspiration into lower respiratory tract  Assessment & Plan  CT reveals overlying atelectasis with evidence of aspiration of hyperdense material  Speech evaluation appreciated: Modified diet  Aspiration precautions     Severe protein-calorie malnutrition (HCC)  Assessment & Plan  Malnutrition Findings:   Adult Malnutrition type: Acute illness  Adult Degree of Malnutrition: Other severe protein calorie malnutrition  Malnutrition Characteristics: Muscle loss, Inadequate energy, Weight loss                  360 Statement: Pt exhibits wevere malnutrition as  evidenced by 12% wt loss x 6 months due to inadequate kcal intakes per pt to be treated with dysphagia diet and supplements.    BMI Findings:           Body mass index is 24.74 kg/m².       Transaminitis  Assessment & Plan  CT of abdomen and pelvis without biliary tree dilatation. Possible related congestive hepatopathy. Improving  Monitor LFTs closely   Hold statin for now      Essential hypertension  Assessment & Plan  BP reviewed and soft   He was started on midodrine as an outpatient  Continue Toprol 12.5 mg daily with holding parameters  Monitor blood pressure closely and adjust medication as indicated    Chronic respiratory failure with hypoxia (HCC)  Assessment & Plan  History of COPD  Chronically wears 2-3L NC              VTE Pharmacologic Prophylaxis: VTE Score: 5 High Risk (Score >/= 5) - Pharmacological DVT Prophylaxis Ordered: heparin. Sequential Compression Devices Ordered.    Mobility:   Basic Mobility Inpatient Raw Score: 19  JH-HLM Goal: 6: Walk 10 steps or more  JH-HLM Achieved: 6: Walk 10 steps or more  JH-HLM Goal achieved. Continue to encourage appropriate mobility.    Patient Centered Rounds: I performed bedside rounds with nursing staff today.   Discussions with Specialists or Other Care Team Provider: FLY    Education and Discussions with Family / Patient: Patient declined call to .  Patient was fully updated at bedside and all of his questions were answered to his satisfaction.     Total Time Spent on Date of Encounter in care of patient: 43 mins. This time was spent on one or more of the following: performing physical exam; counseling and coordination of care; obtaining or reviewing history; documenting in the medical record; reviewing/ordering tests, medications or procedures; communicating with other healthcare professionals and discussing with patient's family/caregivers.    Current Length of Stay: 1 day(s)  Current Patient Status: Inpatient   Certification Statement: The  patient will continue to require additional inpatient hospital stay due to CHEYANNE  Discharge Plan: Anticipate discharge in 48 hrs to TBD.     Code Status: Level 3 - DNAR and DNI    Subjective:   Patient seen and examined.  He is feeling better and has no complaints offered.     Objective:     Vitals:   Temp (24hrs), Av.2 °F (36.2 °C), Min:96.6 °F (35.9 °C), Max:97.6 °F (36.4 °C)    Temp:  [96.6 °F (35.9 °C)-97.6 °F (36.4 °C)] 96.6 °F (35.9 °C)  HR:  [] 94  Resp:  [17-20] 17  BP: ()/(51-63) 107/63  SpO2:  [85 %-99 %] 97 %  Body mass index is 24.74 kg/m².     Input and Output Summary (last 24 hours):     Intake/Output Summary (Last 24 hours) at 3/15/2024 1835  Last data filed at 3/15/2024 1758  Gross per 24 hour   Intake 1280 ml   Output 2850 ml   Net -1570 ml       Physical Exam:   Physical Exam  Vitals and nursing note reviewed.   HENT:      Head: Normocephalic and atraumatic.      Right Ear: Decreased hearing noted.      Left Ear: Decreased hearing noted.      Nose: Nose normal.      Mouth/Throat:      Pharynx: Oropharynx is clear.   Eyes:      Pupils: Pupils are equal, round, and reactive to light.   Cardiovascular:      Rate and Rhythm: Normal rate and regular rhythm.      Pulses: Normal pulses.      Heart sounds: Normal heart sounds.   Pulmonary:      Effort: Pulmonary effort is normal. No respiratory distress.      Breath sounds: Decreased breath sounds present.   Abdominal:      General: Bowel sounds are normal.      Palpations: Abdomen is soft.      Tenderness: There is no abdominal tenderness.   Musculoskeletal:      Cervical back: Neck supple.      Right lower leg: Edema present.      Left lower leg: Edema present.   Skin:     General: Skin is warm and dry.      Capillary Refill: Capillary refill takes less than 2 seconds.   Neurological:      General: No focal deficit present.      Mental Status: He is alert and oriented to person, place, and time. Mental status is at baseline.         Additional Data:     Labs:  Results from last 7 days   Lab Units 03/15/24  0441 03/14/24  0451 03/13/24  1414   WBC Thousand/uL 10.47*   < > 9.84   HEMOGLOBIN g/dL 13.2   < > 14.2   HEMATOCRIT % 43.2   < > 46.9   PLATELETS Thousands/uL 154   < > 177   NEUTROS PCT %  --   --  87*   LYMPHS PCT %  --   --  5*   MONOS PCT %  --   --  7   EOS PCT %  --   --  0    < > = values in this interval not displayed.     Results from last 7 days   Lab Units 03/15/24  0441 03/14/24  0451   SODIUM mmol/L 137 137   POTASSIUM mmol/L 3.8 4.1   CHLORIDE mmol/L 100 101   CO2 mmol/L 25 26   BUN mg/dL 37* 36*   CREATININE mg/dL 2.43* 2.28*   ANION GAP mmol/L 12 10   CALCIUM mg/dL 8.4 8.5   ALBUMIN g/dL  --  3.4*   TOTAL BILIRUBIN mg/dL  --  0.64   ALK PHOS U/L  --  115*   ALT U/L  --  65*   AST U/L  --  34   GLUCOSE RANDOM mg/dL 119 127                       Lines/Drains:  Invasive Devices       Peripheral Intravenous Line  Duration             Peripheral IV 03/13/24 Distal;Right;Upper;Ventral (anterior) Arm 2 days                    Imaging: Reviewed radiology reports from this admission including: abdominal/pelvic CT    Recent Cultures (last 7 days):   Results from last 7 days   Lab Units 03/14/24  1102   GRAM STAIN RESULT  1+ Polys  No bacteria seen   BODY FLUID CULTURE, STERILE  No growth       Last 24 Hours Medication List:   Current Facility-Administered Medications   Medication Dose Route Frequency Provider Last Rate    ALPRAZolam  0.25 mg Oral HS PRN INOCENTE Andrea      aspirin  81 mg Oral HS INOCENTE Andrea      budesonide  0.5 mg Nebulization BID INOCENTE Andrea      cholecalciferol  1,000 Units Oral Daily INOCENTE Andrea      dextromethorphan-guaiFENesin  5 mL Oral TID PRN INOECNTE Andrea      famotidine  10 mg Oral HS INOCENTE Andrea      fluticasone  1 spray Nasal BID INOCENTE Andrea      formoterol  20 mcg Nebulization BID INOCENTE Andrea      furosemide  40 mg  Intravenous BID (diuretic) INOCENTE Andrea      gabapentin  100 mg Oral HS INOCENTE Andrea      heparin (porcine)  5,000 Units Subcutaneous Q8H SVETLANA INOCENTE Andrea      HYDROcodone-acetaminophen  1 tablet Oral Q6H PRN INOCENTE Andrea      ipratropium  0.5 mg Nebulization TID Hyacinth Espana MD      levalbuterol  1.25 mg Nebulization TID Hyacinth Espana MD      lidocaine  2 patch Topical Daily INOCENTE Andrea      methocarbamol  500 mg Oral TID PRN INOCENTE Andrea      metoprolol succinate  12.5 mg Oral Daily INOCENTE Andrea      midodrine  5 mg Oral TID AC INOCENTE Andrea      pantoprazole  20 mg Oral Early Morning INOCENTE Andrea      saliva substitute  1 spray Mouth/Throat Q4H PRN INOCENTE Andrea      sertraline  50 mg Oral Daily INOCENTE Andrea      sodium chloride  1 spray Each Nare Q1H PRN INOCENTE Andrea          Today, Patient Was Seen By: INOCENTE Shoemaker    **Please Note: This note may have been constructed using a voice recognition system.**

## 2024-03-15 NOTE — PLAN OF CARE
Problem: PHYSICAL THERAPY ADULT  Goal: Performs mobility at highest level of function for planned discharge setting.  See evaluation for individualized goals.  Description: Treatment/Interventions: Functional transfer training, LE strengthening/ROM, Elevations, Therapeutic exercise, Endurance training, Patient/family training, Gait training, Spoke to nursing, Spoke to case management  Equipment Recommended:  (continue RW use)       See flowsheet documentation for full assessment, interventions and recommendations.  Note: Prognosis: Fair  Problem List: Decreased strength, Decreased endurance, Impaired balance, Decreased mobility, Impaired hearing  Assessment: Pt is 78 y.o. male seen for high-complexity PT evaluation on 3/15/2024 s/p admit to [facility] on 3/13/2024 w/ CHEYANNE (acute kidney injury) (HCC). PT initially consulted to assess pt's functional mobility and d/c needs. Order placed for PT eval and tx, w/ up and OOB as tolerated order. PTA, At baseline pt (I) ADLs/IADLs, lives c family in 1 [place] c 3 [place]. At time of eval, pt requires SUP for all phases of functional mobility, including amb x 24 ft with RW. Upon evaluation, pt presenting with impaired functional mobility d/t decreased strength, decreased endurance, impaired balance, decreased mobility, and impaired hearing. Pertinent PMHx and current co-morbidities affecting pt's physical performance at time of assessment include: CHEYANNE, CHF c L ventricular systolic dysfunction, transaminitis, HTN, chronic respiratory failure c hypoxia, h/o COPD, LAMBERTO on CPAP. Personal factors affecting pt at time of eval include: positive fall history, increased age. Objective measures performed on IE also reveal limitations: AM-PAC 6-Clicks: 19/24. Pt's clinical presentation is currently unstable/unpredictable seen in pt's presentation of abnormal lab value(s), need for input for task focus and mobility technique and ongoing medical assessment, fall risk, CHACON,  progressive symptoms prior to hospitalization.  Overall, pt's rehab potential and prognosis to return to PLOF is fair as impacted by objective findings, warranting pt to receive further skilled PT interventions to address identified impairments, activity limitation(s), and participation restriction(s). Pt is to benefit from continued PT tx to address deficits as defined above and maximize level of functional independent mobility and consistency in order for pt to improve activity tolerance. From PT/mobility standpoint, recommendation at time of d/c would be level 3, minimal resource intensity PT services, pending pt's overall functional progress in order to facilitate return to PLOF and abilities.  Barriers to Discharge: None     Rehab Resource Intensity Level, PT: III (Minimum Resource Intensity)    See flowsheet documentation for full assessment.

## 2024-03-15 NOTE — PLAN OF CARE
Problem: OCCUPATIONAL THERAPY ADULT  Goal: Performs self-care activities at highest level of function for planned discharge setting.  See evaluation for individualized goals.  Description: Treatment Interventions: ADL retraining, Functional transfer training, UE strengthening/ROM, Endurance training, Patient/family training, Equipment evaluation/education, Compensatory technique education, Energy conservation, Activityengagement    See flowsheet documentation for full assessment, interventions and recommendations.   Note: Limitation: Decreased ADL status, Decreased Safe judgement during ADL, Decreased endurance, Decreased self-care trans, Decreased high-level ADLs  Prognosis: Good  Assessment: Pt is a 78 y.o. male seen for OT evaluation s/p admit to [facility] on 3/13/2024 w/ CHEYANNE (acute kidney injury) (HCC).  Comorbidities affecting pt's functional performance at time of assessment include:  CHF, HTN, Severe protein-calorie malnutrition . Personal factors affecting pt at time of IE include:steps to enter environment and difficulty performing ADLS. Prior to admission, pt was Mod I with ADLs. Upon evaluation: the following deficits impact occupational performance: decreased balance and decreased tolerance. Pt to benefit from continued skilled OT tx while in the hospital to address deficits as defined above and maximize level of functional independence w ADL's and functional mobility. Occupational Performance areas to address include: bathing/shower, toilet hygiene, dressing, functional mobility, and clothing management. From OT standpoint, recommendation at time of d/c would be Level III (Minimum Resource Intensity).     Rehab Resource Intensity Level, OT: III (Minimum Resource Intensity)     Olive Coneth MS, OTR/L

## 2024-03-15 NOTE — ASSESSMENT & PLAN NOTE
BP reviewed and soft   He was started on midodrine as an outpatient  Continue Toprol 12.5 mg daily with holding parameters  Monitor blood pressure closely and adjust medication as indicated

## 2024-03-15 NOTE — NURSING NOTE
Oob in chair with legs up. Resp rx being given no distress. 02 sat 99% hr 71/min. Call bell is near.

## 2024-03-15 NOTE — SPEECH THERAPY NOTE
Speech Language/Pathology    Speech/Language Pathology Progress Note    Patient Name: Seun Alva  Today's Date: 3/15/2024     Problem List  Principal Problem:    CHEYANNE (acute kidney injury) ([facility])  Active Problems:    LAMBERTO on CPAP    Chronic respiratory failure with hypoxia (HCC)    Congestive heart failure with left ventricular systolic dysfunction (LVSD) ([facility])    Primary hypertension    Abnormal CT scan    Transaminitis    Severe protein-calorie malnutrition (HCC)       Past Medical History  Past Medical History:   Diagnosis Date    Abnormal ECG 2021 OR 2022    Alcoholism (HCC) 1984    sober 38 years    Arthritis 2008    beginning    Bilateral carotid artery stenosis     Callus     Cancer (HCC)     skin    Chronic diastolic heart failure (HCC)     Chronic ischemic heart disease     Chronic kidney disease     stage 3    Colon polyp     COPD (chronic obstructive pulmonary disease) ([facility])     Coronary artery disease     hx stents, MI, PCI    COVID 11/2021    COVID 12/01/2023    CPAP (continuous positive airway pressure) dependence     Disease of thyroid gland 2017    nodules    Emphysema of lung ([facility]) 1995    started    Hearing loss     History of transfusion 1995    Hyperlipidemia     Hypertension     Intracranial aneurysm 04/25/2023    Lung nodule 2023    x rays    MI (myocardial infarction) ([facility]) 1995    Myocardial infarction ([facility]) 1995    Pneumonia     Pneumothorax 02/20/2023    collapsed lung    Prostate cancer ([facility])     RSV (respiratory syncytial virus infection) 10/2022    S/P carotid endarterectomy     Shortness of breath     O2 2 l/nc PRN    Sleep apnea     Sleep apnea, obstructive     Stented coronary artery         Past Surgical History  Past Surgical History:   Procedure Laterality Date    APPENDECTOMY      CARDIAC CATHETERIZATION  03/18/2021    left main with no significant disease, proximal LAD with 10% stenosis at the site of prior stent, left circumflex artery with minimal luminal irregularities mid  RCA with 50% stenosis at site of prior stent and PL segment with distal disease supplied by collaterals from the distal circumflex with no significant CAD requiring revascularization at that time.    CATARACT EXTRACTION, BILATERAL Bilateral     COLONOSCOPY      CORONARY ANGIOPLASTY WITH STENT PLACEMENT  1999    RCA    CORONARY ANGIOPLASTY WITH STENT PLACEMENT  2001    RCA    CORONARY ANGIOPLASTY WITH STENT PLACEMENT  2003    LAD    EYE SURGERY      NM BX PROSTATE STRTCTC SATURATION SAMPLING IMG GID N/A 09/13/2022    Procedure: TRANSPERINEAL MRI FUSION  BIOPSY PROSTATE;  Surgeon: Mele Patel MD;  [facility];  Service: Urology    NM NEUROPLASTY &/TRANSPOS MEDIAN NRV CARPAL TUNNE Left 07/22/2022    Procedure: RELEASE CARPAL TUNNEL;  Surgeon: Matty Mcgowan MD;  [facility];  Service: Orthopedics    NM NEUROPLASTY &/TRANSPOSITION ULNAR NERVE ELBOW Left 07/22/2022    Procedure: RELEASE CUBITAL TUNNEL;  Surgeon: Matty Mcgowan MD;  [facility];  Service: Orthopedics    NM NEUROPLASTY &/TRANSPOSITION ULNAR NERVE WRIST Left 07/22/2022    Procedure: GUYON'S CANAL RELEASE;  Surgeon: Matty Mcgowan MD;  [facility];  Service: Orthopedics    SKIN CANCER EXCISION  2012    chin-per pt, basal cell  also right ankle    TONSILLECTOMY  no         Subjective:  Pt was alert and positioned upright OOB in recliner.   Objective:  Pt was seen for f/u dysphagia therapy at lunch. Spoke with nursing reported pt increased difficulty with broccoli and intermittent coughing. Upon ST arrival pt completing approx 50% of meal tray. He declined additional trails of [facility] soft consistencies. Reported broccoli was not chopped up how he usually does. Pt agreeable to pudding and thin liquids. Pt with thin liquids in bottle extending head back for sip in which immediate successive cough followed. ST provided pt with straw and cued to utilize small sips. Pt understood. Provided and reviewed visual cue of  "safe swallow strategies. Bolus control, formation, and transfer were WNL. Swallows appeared prompt. He inconsitently followed verbal cue to initiate secondary swallow. No other overt s/s of aspiration with use of strategies. Pt reported continued difficulty w/ mastication of salmon and broccoli. ST advised ordering condiments to moisten or downgrade if pt preferred. Pt stated \" I would rather do that I really do not want pureed\"    Assessment:  Pt continues with increased difficulties with mastication. Inconsistently utilize strategies.Provided pt with visual cue, diet level education, and diet level energy conservation information.     Plan/Recommendations:  Continue dysphagia 2 mech soft and thin liquids   Ensure good oral care  Aspiration precautions   ST continue f/u      "

## 2024-03-15 NOTE — OCCUPATIONAL THERAPY NOTE
Occupational Therapy Evaluation      Seun Alva    3/15/2024    Principal Problem:    CHEYANNE (acute kidney injury) ([facility])  Active Problems:    LAMBERTO on CPAP    Chronic respiratory failure with hypoxia (HCC)    Congestive heart failure with left ventricular systolic dysfunction (LVSD) ([facility])    Primary hypertension    Abnormal CT scan    Transaminitis    Severe protein-calorie malnutrition (HCC)      Past Medical History:   Diagnosis Date    Abnormal ECG 2021 OR 2022    Alcoholism (HCC) 1984    sober 38 years    Arthritis 2008    beginning    Bilateral carotid artery stenosis     Callus     Cancer (HCC)     skin    Chronic diastolic heart failure (HCC)     Chronic ischemic heart disease     Chronic kidney disease     stage 3    Colon polyp     COPD (chronic obstructive pulmonary disease) ([facility])     Coronary artery disease     hx stents, MI, PCI    COVID 11/2021    COVID 12/01/2023    CPAP (continuous positive airway pressure) dependence     Disease of thyroid gland 2017    nodules    Emphysema of lung ([facility]) 1995    started    Hearing loss     History of transfusion 1995    Hyperlipidemia     Hypertension     Intracranial aneurysm 04/25/2023    Lung nodule 2023    x rays    MI (myocardial infarction) ([facility]) 1995    Myocardial infarction ([facility]) 1995    Pneumonia     Pneumothorax 02/20/2023    collapsed lung    Prostate cancer ([facility])     RSV (respiratory syncytial virus infection) 10/2022    S/P carotid endarterectomy     Shortness of breath     O2 2 l/nc PRN    Sleep apnea     Sleep apnea, obstructive     Stented coronary artery        Past Surgical History:   Procedure Laterality Date    APPENDECTOMY      CARDIAC CATHETERIZATION  03/18/2021    left main with no significant disease, proximal LAD with 10% stenosis at the site of prior stent, left circumflex artery with minimal luminal irregularities mid RCA with 50% stenosis at site of prior stent and PL segment with distal disease supplied by collaterals from the distal  circumflex with no significant CAD requiring revascularization at that time.    CATARACT EXTRACTION, BILATERAL Bilateral     COLONOSCOPY      CORONARY ANGIOPLASTY WITH STENT PLACEMENT  1999    RCA    CORONARY ANGIOPLASTY WITH STENT PLACEMENT  2001    RCA    CORONARY ANGIOPLASTY WITH STENT PLACEMENT  2003    LAD    EYE SURGERY      RI BX PROSTATE STRTCTC SATURATION SAMPLING IMG GID N/A 09/13/2022    Procedure: TRANSPERINEAL MRI FUSION  BIOPSY PROSTATE;  Surgeon: Mele Patel MD;  [facility];  Service: Urology    RI NEUROPLASTY &/TRANSPOS MEDIAN NRV CARPAL TUNNE Left 07/22/2022    Procedure: RELEASE CARPAL TUNNEL;  Surgeon: Matty Mcgowan MD;  [facility];  Service: Orthopedics    RI NEUROPLASTY &/TRANSPOSITION ULNAR NERVE ELBOW Left 07/22/2022    Procedure: RELEASE CUBITAL TUNNEL;  Surgeon: Matty Mcgowan MD;  [facility];  Service: Orthopedics    RI NEUROPLASTY &/TRANSPOSITION ULNAR NERVE WRIST Left 07/22/2022    Procedure: GUYON'S CANAL RELEASE;  Surgeon: Matty Mcgowan MD;  [facility];  Service: Orthopedics    SKIN CANCER EXCISION  2012    chin-per pt, basal cell  also right ankle    TONSILLECTOMY  no        03/15/24 1105   OT Last Visit   OT Visit Date 03/15/24   Note Type   Note type Evaluation   Pain Assessment   Pain Assessment Tool 0-10   Restrictions/Precautions   Weight Bearing Precautions Per Order No   Other Precautions Aspiration;Chair Alarm;Bed Alarm;O2;Fall Risk;Hard of hearing  (Aspiration;Chair Alarm;Bed Alarm;O2;Fall Risk;Hard of hearing;)   Home Living   Type of Home House   Home Layout Two level;Performs ADLs on one level;Able to live on main level with bedroom/bathroom;Stairs to enter with rails  (2 [place])   Bathroom Shower/Tub Walk-in shower   Bathroom Toilet Standard   Bathroom Equipment Shower chair;Commode   Bathroom Accessibility Accessible   Home Equipment Walker;Cane  (baseline RW use)   Prior Function   Level of [place] Independent with  ADLs;Independent with functional mobility;Needs assistance with IADLS   Lives With Spouse;Daughter;Family   Receives Help From Family   IADLs Independent with meal prep;Independent with medication management;Family/Friend/Other provides transportation   Falls in the last 6 months 5 to 10  (5 falls - pt reports d/t knees giving out)   Vocational Retired   ADL   UB Bathing Assistance 5  Supervision/Setup   LB Bathing Assistance 5  Supervision/Setup   UB Dressing Assistance 5  Supervision/Setup   LB Dressing Assistance 4  Minimal Assistance   Bed Mobility   Additional Comments Pt OOB in recliner upon arrival and conclusion   Transfers   Sit to Stand 5  Supervision   Additional items Assist x 1;Armrests;Increased time required   Stand to Sit 5  Supervision   Additional items Assist x 1;Armrests;Increased time required   Additional Comments BP L UE post ambulation = 83/60, BP R UE post amb = 94/51; pt with +lightheadedness toward end of ambulation attempt, SpO2 dropped to 85% at lowest, recovered to 89% x 3 mins of seated rest   Functional Mobility   Functional Mobility 5  Supervision   Additional Comments A x1   Additional items Rolling walker   Balance   Static Sitting Good   Dynamic Sitting Fair +   Static Standing Fair   Dynamic Standing Fair -   Ambulatory Fair -   Activity Tolerance   Activity Tolerance Patient limited by fatigue   Medical Staff Made Aware CM notified   Nurse Made Aware RN Beata JAQUEZ Assessment   RUE Assessment WFL   LUE Assessment   LUE Assessment WFL   Cognition   Overall Cognitive Status WFL   Arousal/Participation Alert;Cooperative   Attention Attends with cues to redirect   Orientation Level Oriented X4   Memory Decreased recall of precautions   Following Commands Follows one step commands without difficulty   Assessment   Limitation Decreased ADL status;Decreased Safe judgement during ADL;Decreased endurance;Decreased self-care trans;Decreased high-level ADLs   Prognosis Good   Assessment Pt  "is a 78 y.o. male seen for OT evaluation s/p admit to [facility] on 3/13/2024 w/ CHEYANNE (acute kidney injury) (HCC).  Comorbidities affecting pt's functional performance at time of assessment include:  CHF, HTN, Severe protein-calorie malnutrition . Personal factors affecting pt at time of IE include:steps to enter environment and difficulty performing ADLS. Prior to admission, pt was Mod I with ADLs. Upon evaluation: the following deficits impact occupational performance: decreased balance and decreased tolerance. Pt to benefit from continued skilled OT tx while in the hospital to address deficits as defined above and maximize level of functional independence w ADL's and functional mobility. Occupational Performance areas to address include: bathing/shower, toilet hygiene, dressing, functional mobility, and clothing management. From OT standpoint, recommendation at time of d/c would be Level III (Minimum Resource Intensity).   Goals   Patient Goals \"to return home\"   Plan   Treatment Interventions ADL retraining;Functional transfer training;UE strengthening/ROM;Endurance training;Patient/family training;Equipment evaluation/education;Compensatory technique education;Energy conservation;Activityengagement   Goal Expiration Date 03/25/24   OT Treatment Day 0   OT Frequency 1-2x/wk   Discharge Recommendation   Rehab Resource Intensity Level, OT III (Minimum Resource Intensity)   Additional Comments  Pt seen as a co-eval with PT due to the patient's co-morbidities, clinically unstable presentation, and present impairments which are a regression from the patient's baseline.   Additional Comments 2 The patient's raw score on the AM-PAC Daily Activity Inpatient Short Form is 20. A raw score of greater than or equal to 19 suggests the patient may benefit from discharge to home. Please refer to the recommendation of the Occupational Therapist for safe discharge planning.   AM-PAC Daily Activity Inpatient   Lower Body Dressing " 3   Bathing 3   Toileting 3   Upper Body Dressing 3   Grooming 4   Eating 4   Daily Activity Raw Score 20   Daily Activity Standardized Score (Calc for Raw Score >=11) 42.03   AM-PAC Applied Cognition Inpatient   Following a Speech/Presentation 4   Understanding Ordinary Conversation 4   Taking Medications 4   Remembering Where Things Are Placed or Put Away 4   Remembering List of 4-5 Errands 4   Taking Care of Complicated Tasks 4   Applied Cognition Raw Score 24   Applied Cognition Standardized Score 62.21     GOALS:    Pt will achieve the following within specified time frame: STG  Pt will achieve the following goals within 5 days    *ADL transfers with (I) for inc'd independence with ADLs/purposeful tasks    *UB ADL with (I) for inc'd independence with self cares    *LB ADL with (S) using AE prn for inc'd independence with self cares    *Toileting with (I) for clothing management and hygiene for return to PLOF with personal care    *Increase static stand balance to F+ and dyn stand balance to F for inc'd safety with standing purposeful tasks    *Increase stand tolerance x5 m for inc'd tolerance with standing purposeful tasks    *Participate in 10m UE therex to increase overall stamina/activity tolerance for purposeful tasks    *Bed mobility- (S) for inc'd independence to manage own comfort and initiate EOB & OOB purposeful tasks    Pt will achieve the following within specified time frame: LTG  Pt will achieve the following goals within 10 days    *LB ADL with (I) using AE prn for inc'd independence with self cares    *Increase static stand balance to G- and dyn stand balance to F+ for inc'd safety with standing purposeful tasks    *Increase stand tolerance x7 m for inc'd tolerance with standing purposeful tasks    *Bed mobility- (I) for inc'd independence to manage own comfort and initiate EOB & OOB purposeful tasks      Olive Conteh, MS, OTR/L

## 2024-03-15 NOTE — PROGRESS NOTES
Patient:    MRN:  [id number]    Clint Request ID:  [id number]    Level of care reserved:  Home Health Agency    Partner Reserved:  [place] [id number] [phone]    Clinical needs requested:    Geography searched:  [id number]    Start of Service:    Request sent:  12:39pm EDT on 3/15/2024 by Nadine Toure    Partner reserved:  3:35pm EDT on 3/15/2024 by Nadine Toure    Choice list shared:  3:14pm EDT on 3/15/2024 by Nadine Toure

## 2024-03-15 NOTE — PROGRESS NOTES
NEPHROLOGY PROGRESS NOTE   Seun Alva 78 y.o. male MRN: [id number]  Unit/Bed#: -01 Encounter: [id number]      HPI/24hr EVENTS:    -78 year old male history of CHF, CKD 3 baseline Creatinine 1.3-1.6 mg/dL, HTN, presented with CHEYANNE. Patient had been taking lasix 40 mg BID over the past month for worsening lower extremity edema. Normally he is on lasix 40 mg daily. Nephrology consulted for management of CHEYANNE on CKD.    -Patient has no complaints today other than being constipated and feeling dizzy at times     ASSESSMENT/PLAN:    CHEYANNE (POA)  -Creatinine on admission 2.3 mg/dL, most recent creatinine 2.43 mg/dL  -Etiology: prerenal CHEYANNE vs ATN from fluid and blood pressure shifts.   He received volume resuscitation with normal saline on 3/13 and 3/14 which has been discontinued with concern for CHF. Patient also with significant volume overload as evidenced by pleural effusion, shortness of breath, and lower extremity pitting edema. She was started back on IV lasix 40 mg BID. Patient was also noted to be consistently hypotensive with MAP above 65% and thus midodrine was increased to 5 mg TID. His urine output is nonoliguric. His creatinine is noted to be uptrending however he examines severely volume overloaded and we should continue with diuretics at this time. May need to consider pursuing more aggressive diuresis in the future if patient is not reaching euvolemia.     CKD 3  -Baseline creatinine more recently 1.3-1.6  -follow up with nephrology as outpatient   Patient was noted to have elevated UPCR, he is on jardiance 10 mg as an outpatient     HFrEF  Patient was found to have pleural effusion on CT imaging and thoracentesis was performed on 3/14 via interventional radiology. He reports his shortness of breath has improved. His weight is stable since admission at 162 lb. Continue with current diuretic regimen. Advised patient to adhere to a low salt diet    Hypertension  He is currently on metoprolol 12.5 mg  daily and lasix 40 mg BID. Patient has been noted to have issues with low blood pressure in the outpatient setting and follows with his outpatient cardiologist for blood pressure management given his heart failure    ROS:  Review of Systems   Constitutional:  Positive for appetite change. Negative for fever.   Respiratory:  Positive for shortness of breath. Negative for cough.    Cardiovascular:  Negative for chest pain and palpitations.   Gastrointestinal:  Negative for abdominal pain and diarrhea.   Neurological:  Positive for dizziness. Negative for headaches.      A complete 10 point review of systems was performed and found to be negative unless otherwise noted above or in the HPI.    OBJECTIVE:  Current Weight: Weight - Scale: 73.8 kg (162 lb 11.2 oz)  Vitals:    03/15/24 0852 03/15/24 0909 03/15/24 1059 03/15/24 1100   BP: 95/57  (!) 83/60 94/51   BP Location:       Pulse: 92  104 96   Resp:       Temp:       TempSrc:       SpO2: 95% 94% (!) 85% (!) 87%   Weight:       Height:           Intake/Output Summary (Last 24 hours) at 3/15/2024 1244  Last data filed at 3/15/2024 1200  Gross per 24 hour   Intake 820 ml   Output 1950 ml   Net -1130 ml     Physical Exam  Vitals and nursing note reviewed.   Constitutional:       General: He is not in acute distress.     Appearance: He is well-developed.   HENT:      Head: Normocephalic and atraumatic.   Cardiovascular:      Rate and Rhythm: Normal rate and regular rhythm.      Heart sounds: No murmur heard.  Pulmonary:      Effort: Pulmonary effort is normal. No respiratory distress.      Breath sounds: Normal breath sounds.   Abdominal:      Palpations: Abdomen is soft.      Tenderness: There is no abdominal tenderness.   Musculoskeletal:      Right lower leg: 3+ Pitting Edema present.      Left lower leg: 3+ Pitting Edema present.   Skin:     General: Skin is warm and dry.      Capillary Refill: Capillary refill takes less than 2 seconds.   Neurological:      Mental  Status: He is alert.          Medications:    Current Facility-Administered Medications:     ALPRAZolam (XANAX) tablet 0.25 mg, 0.25 mg, Oral, HS PRN, INOCENTE Andrea, 0.25 mg at 03/14/24 0039    aspirin chewable tablet 81 mg, 81 mg, Oral, HS, INOCENTE Andrea, 81 mg at 03/14/24 2122    budesonide (PULMICORT) inhalation solution 0.5 mg, 0.5 mg, Nebulization, BID, INOCENTE Andrea, 0.5 mg at 03/15/24 0615    cholecalciferol (VITAMIN D3) tablet 1,000 Units, 1,000 Units, Oral, Daily, INOCENTE Andrea, 1,000 Units at 03/15/24 0853    dextromethorphan-guaiFENesin (ROBITUSSIN DM) oral syrup 5 mL, 5 mL, Oral, TID PRN, INOCENTE Andrea    famotidine (PEPCID) tablet 10 mg, 10 mg, Oral, HS, INOCENTE Andrea, 10 mg at 03/14/24 2122    fluticasone (FLONASE) 50 mcg/act nasal spray 1 spray, 1 spray, Nasal, BID, INOCENTE Andrea, 1 spray at 03/15/24 0855    formoterol (PERFOROMIST) nebulizer solution 20 mcg, 20 mcg, Nebulization, BID, INOCENTE Andrea, 20 mcg at 03/15/24 0617    furosemide (LASIX) injection 40 mg, 40 mg, Intravenous, BID (diuretic), INOCENTE Andrea, 40 mg at 03/15/24 0854    [COMPLETED] gabapentin (NEURONTIN) capsule 200 mg, 200 mg, Oral, HS, 200 mg at 03/13/24 2211 **FOLLOWED BY** gabapentin (NEURONTIN) capsule 100 mg, 100 mg, Oral, HS, INOCENTE Andrea, 100 mg at 03/14/24 2122    heparin (porcine) subcutaneous injection 5,000 Units, 5,000 Units, Subcutaneous, Q8H SVETLANA, INOCENTE Andrea, 5,000 Units at 03/15/24 0627    HYDROcodone-acetaminophen (NORCO) 5-325 mg per tablet 1 tablet, 1 tablet, Oral, Q6H PRN, INOCENTE Andrea, 1 tablet at 03/14/24 0234    ipratropium (ATROVENT) 0.02 % inhalation solution 0.5 mg, 0.5 mg, Nebulization, TID, Hyacinth Espana MD, 0.5 mg at 03/15/24 0615    levalbuterol (XOPENEX) inhalation solution 1.25 mg, 1.25 mg, Nebulization, TID, Hyacinth Espana MD, 1.25 mg at 03/15/24 0615    lidocaine  (LIDODERM) 5 % patch 2 patch, 2 patch, Topical, Daily, INOCENTE Andrea, 2 patch at 03/14/24 2121    methocarbamol (ROBAXIN) tablet 500 mg, 500 mg, Oral, TID PRN, INOCENTE Andrea    metoprolol succinate (TOPROL-XL) 24 hr tablet 12.5 mg, 12.5 mg, Oral, Daily, INOCENTE Andrea    midodrine (PROAMATINE) tablet 5 mg, 5 mg, Oral, TID AC, INOCENTE Andrea, 5 mg at 03/15/24 1142    pantoprazole (PROTONIX) EC tablet 20 mg, 20 mg, Oral, Early Morning, INOCENTE Andrea, 20 mg at 03/15/24 0627    saliva substitute (MOUTH KOTE) mucosal solution 1 spray, 1 spray, Mouth/Throat, Q4H PRN, INOCENTE Andrea    sertraline (ZOLOFT) tablet 50 mg, 50 mg, Oral, Daily, INOCENTE Andera, 50 mg at 03/15/24 0853    sodium chloride (OCEAN) 0.65 % nasal spray 1 spray, 1 spray, Each Nare, Q1H PRN, INOCENTE Andrea, 1 spray at 03/14/24 2128    Laboratory Results:  Results from last 7 days   Lab Units 03/15/24  0441 03/14/24  0451 03/13/24  1414 03/12/24  1405   WBC Thousand/uL 10.47* 8.42 9.84 10.85*   HEMOGLOBIN g/dL 13.2 12.7 14.2 13.9   HEMATOCRIT % 43.2 41.7 46.9 46.7   PLATELETS Thousands/uL 154 164 177 194   POTASSIUM mmol/L 3.8 4.1 4.7 4.7   CHLORIDE mmol/L 100 101 99 99   CO2 mmol/L 25 26 28 29   BUN mg/dL 37* 36* 35* 37*   CREATININE mg/dL 2.43* 2.28* 2.30* 2.34*   CALCIUM mg/dL 8.4 8.5 9.2 9.2   MAGNESIUM mg/dL  --   --  2.3  --        I have personally reviewed the blood work as stated above and in my note.  I have personally reviewed CTAP imaging studies.  I have personally reviewed SLIM note.

## 2024-03-15 NOTE — PLAN OF CARE
Problem: Prexisting or High Potential for Compromised Skin Integrity  Goal: Skin integrity is maintained or improved  Description: INTERVENTIONS:  - Identify patients at risk for skin breakdown  - Assess and monitor skin integrity  - Assess and monitor nutrition and hydration status  - Monitor labs   - Assess for incontinence   - Turn and reposition patient  - Assist with mobility/ambulation  - Relieve pressure over bony prominences  - Avoid friction and shearing  - Provide appropriate hygiene as needed including keeping skin clean and dry  - Evaluate need for skin moisturizer/barrier cream  - Collaborate with interdisciplinary team   - Patient/family teaching  - Consider wound care consult   3/15/2024 0621 by Renee John RN  Outcome: Progressing  3/14/2024 2305 by Renee John RN  Outcome: Progressing     Problem: PAIN - ADULT  Goal: Verbalizes/displays adequate comfort level or baseline comfort level  Description: Interventions:  - Encourage patient to monitor pain and request assistance  - Assess pain using appropriate pain scale  - Administer analgesics based on type and severity of pain and evaluate response  - Implement non-pharmacological measures as appropriate and evaluate response  - Consider cultural and social influences on pain and pain management  - Notify physician/advanced practitioner if interventions unsuccessful or patient reports new pain  3/15/2024 0621 by Renee John RN  Outcome: Progressing  3/14/2024 2305 by Renee John RN  Outcome: Progressing     Problem: INFECTION - ADULT  Goal: Absence or prevention of progression during hospitalization  Description: INTERVENTIONS:  - Assess and monitor for signs and symptoms of infection  - Monitor lab/diagnostic results  - Monitor all insertion sites, i.e. indwelling lines, tubes, and drains  - Monitor endotracheal if appropriate and nasal secretions for changes in amount and color  - [place] appropriate  cooling/warming therapies per order  - Administer medications as ordered  - Instruct and encourage patient and family to use good hand hygiene technique  - Identify and instruct in appropriate isolation precautions for identified infection/condition  3/15/2024 0621 by Renee John RN  Outcome: Progressing  3/14/2024 2305 by Renee John RN  Outcome: Progressing     Problem: SAFETY ADULT  Goal: Patient will remain free of falls  Description: INTERVENTIONS:  - Educate patient/family on patient safety including physical limitations  - Instruct patient to call for assistance with activity   - Consult OT/PT to assist with strengthening/mobility   - Keep Call bell within reach  - Keep bed low and locked with side rails adjusted as appropriate  - Keep care items and personal belongings within reach  - Initiate and maintain comfort rounds  - Make Fall Risk Sign visible to staff  - Offer Toileting every 4 Hours, in advance of need  - Initiate/Maintain BED alarm  - Obtain necessary fall risk management equipment:   - Apply yellow socks and bracelet for high fall risk patients  - Consider moving patient to room near nurses station  3/15/2024 0621 by Renee John RN  Outcome: Progressing  3/14/2024 2305 by Renee John RN  Outcome: Progressing  Goal: Maintain or return to baseline ADL function  Description: INTERVENTIONS:  -  Assess patient's ability to carry out ADLs; assess patient's baseline for ADL function and identify physical deficits which impact ability to perform ADLs (bathing, care of mouth/teeth, toileting, grooming, dressing, etc.)  - Assess/evaluate cause of self-care deficits   - Assess range of motion  - Assess patient's mobility; develop plan if impaired  - Assess patient's need for assistive devices and provide as appropriate  - Encourage maximum independence but intervene and supervise when necessary  - Involve family in performance of ADLs  - Assess for home care needs following  discharge   - Consider OT consult to assist with ADL evaluation and planning for discharge  - Provide patient education as appropriate  3/15/2024 0621 by Renee John RN  Outcome: Progressing  3/14/2024 2305 by Renee John RN  Outcome: Progressing  Goal: Maintains/Returns to pre admission functional level  Description: INTERVENTIONS:  - Perform AM-PAC 6 Click Basic Mobility/ Daily Activity assessment daily.  - Set and communicate daily mobility goal to care team and patient/family/caregiver.   - Collaborate with rehabilitation services on mobility goals if consulted  - Stand patient 2 ties a day  - Ambulate patient 2 times a day  - Out of bed to chair 2 times a day   - Out of bed for meals 2 times a day  - Out of bed for toileting  - Record patient progress and toleration of activity level   3/15/2024 0621 by Renee John RN  Outcome: Progressing  3/14/2024 2305 by Renee John RN  Outcome: Progressing     Problem: DISCHARGE PLANNING  Goal: Discharge to home or other facility with appropriate resources  Description: INTERVENTIONS:  - Identify barriers to discharge w/patient and caregiver  - Arrange for needed discharge resources and transportation as appropriate  - Identify discharge learning needs (meds, wound care, etc.)  - Arrange for interpretive services to assist at discharge as needed  - Refer to Case Management Department for coordinating discharge planning if the patient needs post-hospital services based on physician/advanced practitioner order or complex needs related to functional status, cognitive ability, or social support system  3/15/2024 0621 by Renee John RN  Outcome: Progressing  3/14/2024 2305 by Renee John RN  Outcome: Progressing     Problem: Knowledge Deficit  Goal: Patient/family/caregiver demonstrates understanding of disease process, treatment plan, medications, and discharge instructions  Description: Complete learning assessment and assess  knowledge base.  Interventions:  - Provide teaching at level of understanding  - Provide teaching via preferred learning methods  3/15/2024 0621 by Renee John RN  Outcome: Progressing  3/14/2024 2305 by Renee John RN  Outcome: Progressing     Problem: Nutrition/Hydration-ADULT  Goal: Nutrient/Hydration intake appropriate for improving, restoring or maintaining nutritional needs  Description: Monitor and assess patient's nutrition/hydration status for malnutrition. Collaborate with interdisciplinary team and initiate plan and interventions as ordered.  Monitor patient's weight and dietary intake as ordered or per policy. Utilize nutrition screening tool and intervene as necessary. Determine patient's food preferences and provide high-protein, high-caloric foods as appropriate.     INTERVENTIONS:  - Monitor oral intake, urinary output, labs, and treatment plans  - Assess nutrition and hydration status and recommend course of action  - Evaluate amount of meals eaten  - Assist patient with eating if necessary   - Allow adequate time for meals  - Recommend/ encourage appropriate diets, oral nutritional supplements, and vitamin/mineral supplements  - Order, calculate, and assess calorie counts as needed  - Recommend, monitor, and adjust tube feedings and TPN/PPN based on assessed needs  - Assess need for intravenous fluids  - Provide specific nutrition/hydration education as appropriate  - Include patient/family/caregiver in decisions related to nutrition  3/15/2024 0621 by Renee John RN  Outcome: Progressing  3/14/2024 2305 by Renee John RN  Outcome: Progressing

## 2024-03-16 NOTE — PLAN OF CARE
Problem: SAFETY ADULT  Goal: Maintain or return to baseline ADL function  Description: INTERVENTIONS:  -  Assess patient's ability to carry out ADLs; assess patient's baseline for ADL function and identify physical deficits which impact ability to perform ADLs (bathing, care of mouth/teeth, toileting, grooming, dressing, etc.)  - Assess/evaluate cause of self-care deficits   - Assess range of motion  - Assess patient's mobility; develop plan if impaired  - Assess patient's need for assistive devices and provide as appropriate  - Encourage maximum independence but intervene and supervise when necessary  - Involve family in performance of ADLs  - Assess for home care needs following discharge   - Consider OT consult to assist with ADL evaluation and planning for discharge  - Provide patient education as appropriate  Outcome: Progressing   Patient ambulated with PT using a standard walker.

## 2024-03-16 NOTE — PROGRESS NOTES
Progress Note - Nephrology   Seun Alva 78 y.o. male MRN: [id number]  Unit/Bed#: -01 Encounter: [id number]    A/P:  1.  Acute kidney injury on CKD 3, present on admission.  Admission creatinine 2.3 Mg per deciliter.  Current creatinine 2.3 Mg per deciliter.  Etiology due to fluid/blood pressure shifts/diuretics.    Creatinine trends are tolerating his diuretics.  Mild hypokalemia noted today 3.1.  Recommend 40 M EQ x 2.  He does still have evidence of lower extremity edema, will continue IV diuresis today.  Recommend a standing weight to further evaluate trends.  Urine output appropriate in the past 24 hours 3.1 L.  BP trends have improved with midodrine, Continue current dose of 5 mg 3 times daily.    Avoid ACE/ARB/SGLT2 inhibitor with CHEYANNE.    2.  CKD 3 with baseline creatinine 1.3-1.6 Mg per deciliter.  Continue outpatient nephrology follow-up.  Hold ACE/ARB/SGLT2 inhibitor with acute kidney injury.  Patient was on Jardiance 10 mg as outpatient, look to resume.    3.  Heart failure with reduced ejection fraction  Patient had a CT on admission that showed pleural effusion required a thoracentesis 3/14.  No weight available today.  Will request a standing weight.  Currently on Lasix 40 mg twice daily.  Continue IV Lasix today.  Continue to follow daily weights, urine output and adhere to dietary restrictions.    4.Hypertension  Patient was noted to have low blood pressure requiring midodrine.  Follow trends with diuresis and metoprolol 12.5 mg/day.  Patient is currently requiring midodrine 5 mg 3 times daily to allow diuresis.    5.Hypokalemia, mild, due to diuretics  Recommend potassium 40 M EQ x 2.  Acute potassium of 4.0 magnesium 2.0.  Follow-up magnesium in AM.    Follow up reason for today's visit:     CHEYANNE (acute kidney injury) (HCC)      Subjective:   Patient seen and examined today.  Patient denied any complaints, no chest pain, shortness of breath, nausea, vomiting, diarrhea, dizziness,  "lightheadedness.  He denies cramping.  O2 stable at 3 L.  No weight obtained today.    Objective:     Vitals: Blood pressure 104/60, pulse 94, temperature (!) 97.2 °F (36.2 °C), resp. rate 18, height 5' 8\" (1.727 m), weight 73.8 kg (162 lb 11.2 oz), SpO2 96%.,Body mass index is 24.74 kg/m².    Weight (last 2 days)       Date/Time Weight    03/15/24 0600 73.8 (162.7)    03/14/24 0803 73.4 (161.82)    03/14/24 0600 73.6 (162.26)     Weight: pt was in bed c pap  was on no pumps no tanks,only one sheet one pillow. at 03/14/24 0600              Intake/Output Summary (Last 24 hours) at 3/16/2024 1507  Last data filed at 3/16/2024 1500  Gross per 24 hour   Intake 1200 ml   Output 2850 ml   Net -1650 ml     I/O last 3 completed shifts:  In: 1280 [P.O.:1280]  Out: 4700 [Urine:4700]         Physical Exam: /60   Pulse 94   Temp (!) 97.2 °F (36.2 °C)   Resp 18   Ht 5' 8\" (1.727 m)   Wt 73.8 kg (162 lb 11.2 oz)   SpO2 96%   BMI 24.74 kg/m²     General Appearance:    Alert, cooperative, no distress, appears stated age   Head:    Normocephalic, without obvious abnormality, atraumatic   Eyes:    Conjunctiva/corneas clear   Ears:    Normal external ears   Nose:   Nares normal, septum midline, mucosa normal, no drainage    or sinus tenderness   Throat:   Lips, mucosa, and tongue normal; teeth and gums normal   Neck:    Back:      Lungs:   Decreased breath sounds anteriorly   Chest wall:    No tenderness or deformity   Heart:    Regular rate and rhythm, S1 and S2 normal, no murmur, rub   or gallop   Abdomen:     Soft, non-tender, bowel sounds active   Extremities: 2+ bilateral pitting edema   Skin:   Skin color, texture, turgor normal, no rashes or lesions       Neurologic: Alert, hearing impairment            Lab, Imaging and other studies: I have personally reviewed pertinent labs.  CBC:   Lab Results   Component Value Date    WBC 9.59 03/16/2024    HGB 13.1 03/16/2024    HCT 42.0 03/16/2024     (H) 03/16/2024    " " (L) 03/16/2024    RBC 4.17 03/16/2024    MCH 31.4 03/16/2024    MCHC 31.2 (L) 03/16/2024    RDW 15.8 (H) 03/16/2024    MPV 10.9 03/16/2024     CMP:   Lab Results   Component Value Date    K 3.1 (L) 03/16/2024    CL 99 03/16/2024    CO2 30 03/16/2024    BUN 36 (H) 03/16/2024    CREATININE 2.30 (H) 03/16/2024    CALCIUM 8.4 03/16/2024    EGFR 26 03/16/2024       .  Results from last 7 days   Lab Units 03/16/24  0459 03/15/24  0441 03/14/24  0451 03/13/24  1414 03/12/24  1405   POTASSIUM mmol/L 3.1* 3.8 4.1 4.7 4.7   CHLORIDE mmol/L 99 100 101 99 99   CO2 mmol/L 30 25 26 28 29   BUN mg/dL 36* 37* 36* 35* 37*   CREATININE mg/dL 2.30* 2.43* 2.28* 2.30* 2.34*   CALCIUM mg/dL 8.4 8.4 8.5 9.2 9.2   ALK PHOS U/L  --   --  115* 136* 123*   ALT U/L  --   --  65* 82* 94*   AST U/L  --   --  34 44* 46*         Phosphorus: No results found for: \"PHOS\"  Magnesium: No results found for: \"MG\"  Urinalysis: No results found for: \"COLORU\", \"CLARITYU\", \"SPECGRAV\", \"PHUR\", \"LEUKOCYTESUR\", \"NITRITE\", \"PROTEINUA\", \"GLUCOSEU\", \"KETONESU\", \"BILIRUBINUR\", \"BLOODU\"  Ionized Calcium: No results found for: \"CAION\"  Coagulation: No results found for: \"PT\", \"INR\", \"APTT\"  Troponin: No results found for: \"TROPONINI\"  ABG: No results found for: \"PHART\", \"SDQ5BCK\", \"PO2ART\", \"DSE5KBW\", \"P7XUPVKJ\", \"BEART\", \"SOURCE\"  Radiology review:     IMAGING  Procedure: FL barium swallow video w speech    Result Date: 3/14/2024  Narrative: A video barium swallow study was performed by the Department of Speech Pathology. Please refer to the report for the official interpretation. The images are stored for archival purposes only. Study images were not formally reviewed by the Radiology Department.    Procedure: FL barium swallow video w speech    Result Date: 3/14/2024  Narrative: JAMES Samuels     3/14/2024  2:27 PM                                  Video Swallow Study Patient Name: Seun Alva Today's Date: 3/14/2024   Past Medical History Past " Medical History: Diagnosis Date  Abnormal ECG 2021 OR 2022  Alcoholism (HCC) 1984  sober 38 years  Arthritis 2008  beginning  Bilateral carotid artery stenosis   Callus   Cancer (HCC)   skin  Chronic diastolic heart failure (HCC)   Chronic ischemic heart disease   Chronic kidney disease   stage 3  Colon polyp   COPD (chronic obstructive pulmonary disease) (HCC)   Coronary artery disease   hx stents, MI, PCI  COVID 11/2021  COVID 12/01/2023  CPAP (continuous positive airway pressure) dependence   Disease of thyroid gland 2017  nodules  Emphysema of lung (HCC) 1995  started  Hearing loss   History of transfusion 1995  Hyperlipidemia   Hypertension   Intracranial aneurysm 04/25/2023  Lung nodule 2023  x rays  MI (myocardial infarction) (HCC) 1995  Myocardial infarction (HCC) 1995  Pneumonia   Pneumothorax 02/20/2023  collapsed lung  Prostate cancer ([facility])   RSV (respiratory syncytial virus infection) 10/2022  S/P carotid endarterectomy   Shortness of breath   O2 2 l/nc PRN  Sleep apnea   Sleep apnea, obstructive   Stented coronary artery   Past Surgical History Past Surgical History: Procedure Laterality Date  APPENDECTOMY    CARDIAC CATHETERIZATION  03/18/2021  left main with no significant disease, proximal LAD with 10% stenosis at the site of prior stent, left circumflex artery with minimal luminal irregularities mid RCA with 50% stenosis at site of prior stent and PL segment with distal disease supplied by collaterals from the distal circumflex with no significant CAD requiring revascularization at that time.  CATARACT EXTRACTION, BILATERAL Bilateral   COLONOSCOPY    CORONARY ANGIOPLASTY WITH STENT PLACEMENT  1999  RCA  CORONARY ANGIOPLASTY WITH STENT PLACEMENT  2001  RCA  CORONARY ANGIOPLASTY WITH STENT PLACEMENT  2003  LAD  EYE SURGERY    FL BX PROSTATE STRTCTC SATURATION SAMPLING IMG GID N/A 09/13/2022  Procedure: TRANSPERINEAL MRI FUSION  BIOPSY PROSTATE;  Surgeon: Mele Patel MD;  Location: [facility];  Service:  Urology  UT NEUROPLASTY &/TRANSPOS MEDIAN NRV CARPAL TUNNE Left 07/22/2022  Procedure: RELEASE CARPAL TUNNEL;  Surgeon: Matty Mcgowan MD;  Location: [facility];  Service: Orthopedics  UT NEUROPLASTY &/TRANSPOSITION ULNAR NERVE ELBOW Left 07/22/2022  Procedure: RELEASE CUBITAL TUNNEL;  Surgeon: Matty Mcgowan MD;  Location: [facility];  Service: Orthopedics  UT NEUROPLASTY &/TRANSPOSITION ULNAR NERVE WRIST Left 07/22/2022  Procedure: GUYON'S CANAL RELEASE;  Surgeon: Matty Mcgowan MD;  Location: [facility];  Service: Orthopedics  SKIN CANCER EXCISION  2012  chin-per pt, basal cell  also right ankle  TONSILLECTOMY  no Summary: Images are on PACS for review. Oral Phase :  Pt presented with mild/mod oral dysphagia. He does have ill fitting dentures however provided fixident able to place. Mastication was mod prolonged. Bolus control and formation were Pharyngeal Phase :  Pt presented with mild pharyngeal dysphagia. Swallow initiation was prompt with liquids and mildly delayed with solids.  Premature spill occurred to the valleculae with solid trials. Hyoid elevation and laryngeal excursion was inconsistently WFL vs mildly reduced. Pharyngeal constriction was mildly reduced. Tip of epiglottis did not consistently invert due to osteophyte C2-C3.  Trace vallecular retention occurred with thin liquids and mild with solids trials. Brief pill stasis occurred with 13 mm pill trial with thin liquids. Provided with successive sips of thin, nectar, and x1 tsp puree pill did not clear. Provided additional x1 tsp of puree with verbal cue to hard swallow pt able to clear pill .Trace pyriform retention observed with all consistencies administered. Trace penetration occurred with thin liquids trials. No overt aspiration observed throughout study. Pt inconsistently independently and when provided verbal cues initiated secondary swallow and effortful swallow to clear vallecular retention. Per Esophageal screen Pt did  "have slow motility of solids throughout the esophagus. Stasis occurred with 13 mm pill in distal esophagus. Provided with additional sips of thin, nectar, and x1 tsp puree pill did not clear. Recommendations: Diet: Dysphagia 2 mech soft Liquids: thin Meds:crush w/ puree Strategies:slow rate, alternate liquids with solids, double swallow, and swallow prior to additional po. Upright position F/u ST tx: yes Therapy Prognosis: fair Prognosis considerations:age, medical status, pt participation Intermittent Supervision Aspiration Precautions Reflux Precautions Consider consult with: GI Results reviewed with: pt, nursing,CRNP, dietician Aspiration precautions posted. If a dedicated assessment of the esophagus is desired, consider esophagram/barium swallow or EGD. Pt is 78 year old male. He has been reporting recent weight loss. Pt reported that he has difficulty chewing and will cut up his food very fine. He stated he will eat so much than will get tired and give the rest to the dogs. He reported to just starting to utilize a supplement at home. Pt also reported utilizing nectar thick liquids through majority of day however will consume some thin liquids. He prefers to take his medications whole w/ thin liquids but stated he does have intermittent globus sensation when taking them. Patient's goal: \" Here we are again \" Goals: Dysphagia LTG -Patient will demonstrate safe and effective oral intake (without overt s/s significant oral/pharyngeal dysphagia including s/s penetration or aspiration) for the highest appropriate diet level. Short Term Goals: -Pt will tolerate Dysphagia 2/mechanical soft diet and honey thin liquid with no significant s/s oral or pharyngeal dysphagia across 1-3 diagnostic session/s. Re: Oral strength, ROM, coordination -Patient will complete exercises to increase laryngeal rise, airway protection and pharyngeal constriction for improved swallow function with minimal/ no cues needed. . Re: Mastication " -Patient will demonstrate adequate mastication to safely consume the  least restrictive diet with no verbal, visual or tactile cues needed. Re: Compensatory Strategies -Patient will utilize trained compensatory strategies (eg.  controlled bite/sip size, controlled rate, ”prep set”, multiple swallows, alternation of food with liquid, effortful swallow.) with 80% accuracy to eliminate overt s/s penetration/aspiration with the least restrictive food/liquid consistencie -Patient will demonstrate independent use of recommended safe swallowing strategies during a clinician assessed meal across 1-3 diagnostic sessions. H&P/Admit info, pertinent provider notes/procedures/studies/PMH:(copied and placed in this report) Seun Alva is a 78 y.o. male with a PMH of congestive heart failure, chronic kidney disease, hypertension, COPD on chronic oxygen and CAD who presents with acute kidney injury.  The patient was seen by his PCP and recommended to come into the hospital due to CHEYANNE.  Patient states that he has been taking furosemide 40 mg twice daily over the past month due to worsening lower extremity edema.  His renal function has been worsening and subsequently PCP recommended to come to the ED.  The patient otherwise is feeling okay.  He had some poor appetite at home.  The patient denies any pain, palpitation, dyspnea worse than baseline, nausea, vomiting, or abdominal pain. Special Studies CT abdomen (03/13/2024) impression No acute abdominopelvic abnormality. No hydronephrosis bilaterally. Persistent moderate right pleural effusion. Overlying atelectasis with evidence of aspiration of hyperdense material. XR chest pa & lateral (03/14/2024) impression Small right pleural effusion and adjacent passive atelectasis is similar to prior study Code Status: Level 3 DNR/DNI Previous VBS: 06/22/2023 completed @ [facility] Oral stage:  Pt presented with minimal oral stage dysphagia. Mastication was intentionally prolonged,  but grossly effective with materials administered today. Bolus formation and transfer were functional.  Oral control appeared adequate with no gross premature spillage over the base of tongue. Pharyngeal stage:  Pt presented with mild pharyngeal dysphagia. Velar elevation noted. Swallowing initiation was mild-moderately delayed, with posterior spillage to vallecular and pyriform sinus. Mild-moderately reduced laryngeal rise and anterior hyoid excursion noted. Airway entrance closure appeared reduced, specifically with successive thin liquids via straw and reduced epiglottic inversion. Tongue base retraction and pharyngeal constriction appeared minimally reduced. PES opening was adequate. Management of food/liquid/barium tablet follows: Transient laryngeal penetration occurred with thin liquids intermittently and shivani aspiration occurred with sequential thin via straw. Strategies and Efficacy: small sips minimized pharyngeal retention, secondary swallow improved removal of pharyngeal retention Aspiration Response and Efficacy:  No cough response noted to aspiration. Esophageal stage: Brief view of esophagus was completed after administration of the barium tablet.  Esophageal phase unremarkable. Assessment Summary:  Pt presents with mild oropharyngeal dysphagia characterized by swallow initiation delay, reduced laryngeal excursion, reduced epiglottic inversion and airway closure, as well as reduced TBR and pharyngeal constriction. Use of small sips minimizes risk of asp/pen and pharyngeal retention, while use of secondary swallow improves pharyngeal clearance with large sips. Silent aspiration occurred with sequential sips of thin via straw.  Note: Images are available for review in PACS as desired Recommendations: Recommended Diet:  regular diet and thin liquids Recommended Form of Medications: whole with liquid and whole with puree Aspiration precautions and compensatory swallowing strategies: small bites/sips, no  straws and secondary swallow Consider referral to:  None at this time SLP Dysphagia therapy recommended: yes for therapeutic strengthening exercises Results Reviewed with: patient and family Pt/Family Education: VBS findings and recommendations immediately reviewed w/ pt w/ use of images to aid in understanding. Education provided on strategies to optimize swallow safety, including the importance of oral care and s/s aspiration to monitor for and notify medical team of should they arise. Pt verbalized understanding and denied questions at this time. Precautions : n/a Food allergies:  No known  Current diet:  Regular and thin  Premorbid diet:  Dysphagia 2 mech soft and thin  Dentition  Lose fitting upper denture, Lower denture Oral Mech  WNL O2 requirements:  3 NC Vocal Quality/Speech WNL Cognitive status:  Alert  Consistencies administered: Barium laden applesauce, nutri grain bar, sandwich bite, nectar thick, thin liquids, 13mm barium pill. Liquids were administered by cup and straw. Pt was seated laterally at 90 degrees. Pt  unable to be viewed in AP position due to transfer status.        Current Facility-Administered Medications   Medication Dose Route Frequency    ALPRAZolam (XANAX) tablet 0.25 mg  0.25 mg Oral HS PRN    aspirin chewable tablet 81 mg  81 mg Oral HS    budesonide (PULMICORT) inhalation solution 0.5 mg  0.5 mg Nebulization BID    cholecalciferol (VITAMIN D3) tablet 1,000 Units  1,000 Units Oral Daily    dextromethorphan-guaiFENesin (ROBITUSSIN DM) oral syrup 5 mL  5 mL Oral TID PRN    famotidine (PEPCID) tablet 10 mg  10 mg Oral HS    fluticasone (FLONASE) 50 mcg/act nasal spray 1 spray  1 spray Nasal BID    formoterol (PERFOROMIST) nebulizer solution 20 mcg  20 mcg Nebulization BID    furosemide (LASIX) injection 40 mg  40 mg Intravenous BID (diuretic)    gabapentin (NEURONTIN) capsule 100 mg  100 mg Oral HS    heparin (porcine) subcutaneous injection 5,000 Units  5,000 Units Subcutaneous Q8H  SVETLANA    HYDROcodone-acetaminophen (NORCO) 5-325 mg per tablet 1 tablet  1 tablet Oral Q6H PRN    ipratropium (ATROVENT) 0.02 % inhalation solution 0.5 mg  0.5 mg Nebulization TID    levalbuterol (XOPENEX) inhalation solution 1.25 mg  1.25 mg Nebulization TID    lidocaine (LIDODERM) 5 % patch 2 patch  2 patch Topical Daily    methocarbamol (ROBAXIN) tablet 500 mg  500 mg Oral TID PRN    metoprolol succinate (TOPROL-XL) 24 hr tablet 12.5 mg  12.5 mg Oral Daily    midodrine (PROAMATINE) tablet 5 mg  5 mg Oral TID AC    pantoprazole (PROTONIX) EC tablet 20 mg  20 mg Oral Early Morning    potassium chloride (Klor-Con M20) CR tablet 40 mEq  40 mEq Oral BID    saliva substitute (MOUTH KOTE) mucosal solution 1 spray  1 spray Mouth/Throat Q4H PRN    sertraline (ZOLOFT) tablet 50 mg  50 mg Oral Daily    sodium chloride (OCEAN) 0.65 % nasal spray 1 spray  1 spray Each Nare Q1H PRN     Medications Discontinued During This Encounter   Medication Reason    oxygen gas     ipratropium-albuterol (DUO-NEB) 0.5-2.5 mg/3 mL inhalation solution 3 mL     sodium chloride 0.9 % infusion     lidocaine (LIDODERM) 5 % patch 1 patch     furosemide (LASIX) injection 40 mg     furosemide (LASIX) injection 40 mg     furosemide (LASIX) injection 40 mg     midodrine (PROAMATINE) tablet 2.5 mg     midodrine (PROAMATINE) tablet 2.5 mg     furosemide (LASIX) injection 40 mg        Debbie Hancock,       This progress note was produced in part using a dictation device which may document imprecise wording from author's original intent.

## 2024-03-16 NOTE — RESPIRATORY THERAPY NOTE
03/16/24 0025   Respiratory Assessment   Resp Comments pt placed on own preset machine. sterile h20 added, oxygen @3.5L bled in.   O2 Device cpap   Non-Invasive Information   O2 Interface Device Nasal mask   Non-Invasive Ventilation Mode CPAP   SpO2 94 %   $ Pulse Oximetry Spot Check Charge Completed   Non-Invasive Settings   FiO2 (%)   (with 3.5 L)   PEEP/CPAP (cm H2O)   (preset)

## 2024-03-16 NOTE — PROGRESS NOTES
[facility]  Progress Note  Name: Seun Alva I  MRN: [id number]  Unit/Bed#: -01 I Date of Admission: 3/13/2024   Date of Service: 3/16/2024 I Hospital Day: 2    Assessment/Plan     * CHEYANNE (acute kidney injury) ([facility])  Assessment & Plan  Suspect pre-renal azotemia however patient appears to be in volume overloaded state. Patient reports using furosemide 40 mg twice daily for the past month due to increasing lower extremity edema  Initially received IV hydration  CT of abdomen and pelvis: No hydronephrosis   Nephrology recommendations appreciated: Continue furosemide 40 mg IV twice daily. Net negative 3,110 mL  Daily metabolic panel and trend creatinine  Avoid further nephrotoxins and hypotension  Monitor I&O    Congestive heart failure with left ventricular systolic dysfunction (LVSD) ([facility])  Assessment & Plan  Wt Readings from Last 3 Encounters:   03/15/24 73.8 kg (162 lb 11.2 oz)   03/11/24 73.4 kg (161 lb 12.8 oz)   03/04/24 68 kg (150 lb)     Acute on chronic CHF  Decompensated but with lower extremity edema, fluid overload, pleural effusion    CT AP 3/13/2024: With persistent moderate right pleural effusion.  Echocardiogram 8/18/23: LVEF of 35%.  BNP >4700  IR thoracentesis 3/14/2024 with 600 ml of pleural fluid removed  Continue Toprol succinate 12.5 mg p.o. daily  Continue diuretics per nephrology   Low sodium diet, daily weights, I&O           Aspiration into lower respiratory tract  Assessment & Plan  CT reveals overlying atelectasis with evidence of aspiration of hyperdense material  Speech evaluation appreciated: Modified diet  Aspiration precautions     Severe protein-calorie malnutrition (HCC)  Assessment & Plan  Malnutrition Findings:   Adult Malnutrition type: Acute illness  Adult Degree of Malnutrition: Other severe protein calorie malnutrition  Malnutrition Characteristics: Muscle loss, Inadequate energy, Weight loss                  360 Statement: Pt sky torres  malnutrition as evidenced by 12% wt loss x 6 months due to inadequate kcal intakes per pt to be treated with dysphagia diet and supplements.    BMI Findings:           Body mass index is 24.74 kg/m².       Transaminitis  Assessment & Plan  CT of abdomen and pelvis without biliary tree dilatation. Possible related congestive hepatopathy. Improving  Monitor LFTs closely   Hold statin for now      Essential hypertension  Assessment & Plan  BP reviewed and soft   He was started on midodrine as an outpatient  Continue Toprol 12.5 mg daily with holding parameters  Monitor blood pressure closely and adjust medication as indicated    Chronic respiratory failure with hypoxia (HCC)  Assessment & Plan  History of COPD  Chronically wears 2-3L NC       LAMBERTO on CPAP  Assessment & Plan  CPAP at bedtime              VTE Pharmacologic Prophylaxis: VTE Score: 5 High Risk (Score >/= 5) - Pharmacological DVT Prophylaxis Ordered: heparin. Sequential Compression Devices Ordered.    Mobility:   Basic Mobility Inpatient Raw Score: 19  JH-HLM Goal: 6: Walk 10 steps or more  JH-HLM Achieved: 7: Walk 25 feet or more  JH-HLM Goal achieved. Continue to encourage appropriate mobility.    Patient Centered Rounds: I performed bedside rounds with nursing staff today.   Discussions with Specialists or Other Care Team Provider: CM    Education and Discussions with Family / Patient: Patient declined call to .     Total Time Spent on Date of Encounter in care of patient: 38 mins. This time was spent on one or more of the following: performing physical exam; counseling and coordination of care; obtaining or reviewing history; documenting in the medical record; reviewing/ordering tests, medications or procedures; communicating with other healthcare professionals and discussing with patient's family/caregivers.    Current Length of Stay: 2 day(s)  Current Patient Status: Inpatient   Certification Statement: The patient will continue to  require additional inpatient hospital stay due to CHEYANNE and CHF.  Discharge Plan: Anticipate discharge in 24-48 hrs to home with home services.    Code Status: Level 3 - DNAR and DNI    Subjective:   Patient says he feels about the same.    Objective:     Vitals:   Temp (24hrs), Av °F (36.1 °C), Min:96.6 °F (35.9 °C), Max:97.4 °F (36.3 °C)    Temp:  [96.6 °F (35.9 °C)-97.4 °F (36.3 °C)] 97.4 °F (36.3 °C)  HR:  [] 92  Resp:  [16-18] 18  BP: (102-111)/(61-64) 102/61  SpO2:  [94 %-99 %] 98 %  Body mass index is 24.74 kg/m².     Input and Output Summary (last 24 hours):     Intake/Output Summary (Last 24 hours) at 3/16/2024 1420  Last data filed at 3/16/2024 1300  Gross per 24 hour   Intake 1200 ml   Output 2825 ml   Net -1625 ml       Physical Exam:   Physical Exam  Vitals and nursing note reviewed.   Constitutional:       Comments: frail   HENT:      Head: Normocephalic and atraumatic.      Right Ear: Decreased hearing noted.      Left Ear: Decreased hearing noted.      Mouth/Throat:      Pharynx: Oropharynx is clear.   Eyes:      Pupils: Pupils are equal, round, and reactive to light.   Cardiovascular:      Rate and Rhythm: Normal rate and regular rhythm.      Pulses: Normal pulses.      Heart sounds: Normal heart sounds.   Pulmonary:      Effort: Pulmonary effort is normal. No respiratory distress.      Breath sounds: Normal breath sounds.   Abdominal:      General: Bowel sounds are normal.      Palpations: Abdomen is soft.      Tenderness: There is no abdominal tenderness.   Musculoskeletal:      Cervical back: Neck supple.      Right lower leg: Edema present.      Left lower leg: Edema present.   Skin:     General: Skin is warm and dry.      Capillary Refill: Capillary refill takes less than 2 seconds.   Neurological:      General: No focal deficit present.      Mental Status: He is alert.          Additional Data:     Labs:  Results from last 7 days   Lab Units 24  0459 24  0451 24  1414    WBC Thousand/uL 9.59   < > 9.84   HEMOGLOBIN g/dL 13.1   < > 14.2   HEMATOCRIT % 42.0   < > 46.9   PLATELETS Thousands/uL 141*   < > 177   NEUTROS PCT %  --   --  87*   LYMPHS PCT %  --   --  5*   MONOS PCT %  --   --  7   EOS PCT %  --   --  0    < > = values in this interval not displayed.     Results from last 7 days   Lab Units 03/16/24  0459 03/15/24  0441 03/14/24  0451   SODIUM mmol/L 139   < > 137   POTASSIUM mmol/L 3.1*   < > 4.1   CHLORIDE mmol/L 99   < > 101   CO2 mmol/L 30   < > 26   BUN mg/dL 36*   < > 36*   CREATININE mg/dL 2.30*   < > 2.28*   ANION GAP mmol/L 10   < > 10   CALCIUM mg/dL 8.4   < > 8.5   ALBUMIN g/dL  --   --  3.4*   TOTAL BILIRUBIN mg/dL  --   --  0.64   ALK PHOS U/L  --   --  115*   ALT U/L  --   --  65*   AST U/L  --   --  34   GLUCOSE RANDOM mg/dL 94   < > 127    < > = values in this interval not displayed.                       Lines/Drains:  Invasive Devices       Peripheral Intravenous Line  Duration             Peripheral IV 03/13/24 Distal;Right;Upper;Ventral (anterior) Arm 3 days                  Imaging: Reviewed radiology reports from this admission including: chest xray    Recent Cultures (last 7 days):   Results from last 7 days   Lab Units 03/14/24  1102   GRAM STAIN RESULT  1+ Polys  No bacteria seen   BODY FLUID CULTURE, STERILE  No growth       Last 24 Hours Medication List:   Current Facility-Administered Medications   Medication Dose Route Frequency Provider Last Rate    ALPRAZolam  0.25 mg Oral HS PRN INOCENTE Andrea      aspirin  81 mg Oral HS INOCENTE Andrea      budesonide  0.5 mg Nebulization BID INOCENTE Andrea      cholecalciferol  1,000 Units Oral Daily INOCENTE Andrea      dextromethorphan-guaiFENesin  5 mL Oral TID PRN Tricia Galdamez, KELLENP      famotidine  10 mg Oral HS INOCENTE Andrea      fluticasone  1 spray Nasal BID INOCENTE Andrea      formoterol  20 mcg Nebulization BID INOCENTE Andrea       furosemide  40 mg Intravenous BID (diuretic) INOCENTE Andrea      gabapentin  100 mg Oral HS INOCENTE Andrea      heparin (porcine)  5,000 Units Subcutaneous Q8H SVETLANA INOCENTE Andrea      HYDROcodone-acetaminophen  1 tablet Oral Q6H PRN INOCENTE Andrea      ipratropium  0.5 mg Nebulization TID Hyacinth Espana MD      levalbuterol  1.25 mg Nebulization TID Hyacinth Espana MD      lidocaine  2 patch Topical Daily INOCENTE Andrea      methocarbamol  500 mg Oral TID PRN INOCENTE Andrea      metoprolol succinate  12.5 mg Oral Daily INOCENTE Andrea      midodrine  5 mg Oral TID AC INOCENTE Andrea      pantoprazole  20 mg Oral Early Morning INOCENTE Andrea      potassium chloride  40 mEq Oral BID Debbie Santi, DO      saliva substitute  1 spray Mouth/Throat Q4H PRN INOCENTE Andrea      sertraline  50 mg Oral Daily INOCENTE Andrea      sodium chloride  1 spray Each Nare Q1H PRN INOCENTE Andrea          Today, Patient Was Seen By: INOCENTE Shoemaker    **Please Note: This note may have been constructed using a voice recognition system.**

## 2024-03-16 NOTE — PHYSICAL THERAPY NOTE
PT Treatment Note    NAME:  Seun Alva  DATE: 03/16/24    AGE:   78 y.o.  Mrn:   [id number]  ADMIT DX:  Abdominal pain [R10.9]  Abnormal laboratory test [R89.9]  CHEYANNE (acute kidney injury) (HCC) [N17.9]  Performed at least 2 patient identifiers during session: Name and ID bracelet         03/16/24 1046   PT Last Visit   PT Visit Date 03/16/24   Note Type   Note Type Treatment   Pain Assessment   Pain Assessment Tool 0-10   Pain Score No Pain   Restrictions/Precautions   Weight Bearing Precautions Per Order No   Other Precautions Hard of hearing;Fall Risk;O2;Bed Alarm;Cognitive;Aspiration   General   Chart Reviewed Yes   Family/Caregiver Present No   Cognition   Overall Cognitive Status WFL   Arousal/Participation Alert   Attention Within functional limits   Orientation Level Oriented X4   Memory Within functional limits   Following Commands Follows one step commands without difficulty   Bed Mobility   Supine to Sit 6  Modified independent   Additional items HOB elevated;Increased time required   Transfers   Sit to Stand 5  Supervision   Additional items Assist x 1;Verbal cues;Increased time required   Stand to Sit 5  Supervision   Additional items Assist x 1;Verbal cues;Increased time required   Additional Comments pt denied dizziness with transitional movement   Ambulation/Elevation   Gait pattern Improper Weight shift;Decreased foot clearance;Forward Flexion   Gait Assistance 5  Supervision   Additional items Assist x 1;Verbal cues   Assistive Device Rolling walker   Distance 40 ft x 2   Ambulation/Elevation Additional Comments Sa O2 decreased to 85% with ambulation requiring multiple mins to recover while seated   Balance   Static Sitting Normal   Dynamic Sitting Good   Static Standing Fair -   Dynamic Standing Fair -   Ambulatory Fair -   Endurance Deficit   Endurance Deficit Yes   Endurance Deficit Description decreased SaO2 levels   Activity Tolerance   Activity Tolerance Patient limited by fatigue    Exercises   Glute Sets Sitting;15 reps;AROM;Bilateral   Hip Flexion Sitting;15 reps;AROM;Bilateral   Hip Adduction Sitting;15 reps;AROM;Bilateral   Knee AROM Long Arc Quad Sitting;15 reps;AROM;Bilateral   Ankle Pumps Sitting;15 reps;AROM;Bilateral   Assessment   Prognosis Fair   Assessment Pt seen for PT treatment session this date with interventions consisting of gait training to normalize gait pattern to decrease fall risk and therapeutic exercise to improve endurance to improve functional mobility. Pt agreeable to PT treatment session upon arrival, pt found supine in bed, in no apparent distress. Since previous session, pt has made good progress as evidenced by decreased assistance required with mobility and increased activity tolerance  Barriers during this session include SOB, decreased SaO2 levels, and fatigue.  Pt continues to be functioning below baseline level, and remains limited 2* factors listed above and including impaired activity tolerance, impaired  balance, decreased endurance, and decreased mobility.  Pt prognosis for achieving goals is good, pending pt progress with hospitalization/medical status improvements, and indicated by motivated to participate in therapy, ability to follow cues, and improvement with mobility status. PT will continue to see pt during current hospitalization in order to address the deficits listed above and provide interventions consistent w/ POC in effort to achieve goals. Current goals and POC remain appropriate, pt continues to have rehab potential  Upon conclusion pt seated OOB in recliner. The patient's AM-PAC Basic Mobility Inpatient Short Form Raw Score is 19.  Based on patient presentations and impairments, pt would most appropriately benefit from Level 3 resource intensity upon discharge.  Please also refer to the recommendation of the Physical Therapist for safe discharge planning.   Barriers to Discharge None   Goals   Patient Goals to feel better   STG  Expiration Date 03/25/24   PT Treatment Day 1   Plan   Treatment/Interventions Functional transfer training;LE strengthening/ROM;Gait training;Bed mobility;Equipment eval/education   Progress Progressing toward goals   PT Frequency 2-3x/wk   Discharge Recommendation   Rehab Resource Intensity Level, PT III (Minimum Resource Intensity)   Equipment Recommended   (continued RW use)   AM-PAC Basic Mobility Inpatient   Turning in Flat Bed Without Bedrails 4   Lying on Back to Sitting on Edge of Flat Bed Without Bedrails 4   Moving Bed to Chair 3   Standing Up From Chair Using Arms 3   Walk in Room 3   Climb 3-5 Stairs With Railing 2   Basic Mobility Inpatient Raw Score 19   Basic Mobility Standardized Score 42.48   [facility] Highest Level Of Mobility   -HLM Goal 6: Walk 10 steps or more   JH-HLM Achieved 7: Walk 25 feet or more       Time In: 1036  Time Out: 1100  Total Treatment Minutes: 24    Xiao Velasquez, PT

## 2024-03-16 NOTE — PLAN OF CARE
Problem: PHYSICAL THERAPY ADULT  Goal: Performs mobility at highest level of function for planned discharge setting.  See evaluation for individualized goals.  Description: Treatment/Interventions: Functional transfer training, LE strengthening/ROM, Elevations, Therapeutic exercise, Endurance training, Patient/family training, Gait training, Spoke to nursing, Spoke to case management  Equipment Recommended:  (continue RW use)       See flowsheet documentation for full assessment, interventions and recommendations.  Outcome: Progressing  Note: Prognosis: Fair  Problem List: Decreased strength, Decreased endurance, Impaired balance, Decreased mobility, Impaired hearing  Assessment: Pt seen for PT treatment session this date with interventions consisting of gait training to normalize gait pattern to decrease fall risk and therapeutic exercise to improve endurance to improve functional mobility. Pt agreeable to PT treatment session upon arrival, pt found supine in bed, in no apparent distress. Since previous session, pt has made good progress as evidenced by decreased assistance required with mobility and increased activity tolerance  Barriers during this session include SOB, decreased SaO2 levels, and fatigue.  Pt continues to be functioning below baseline level, and remains limited 2* factors listed above and including impaired activity tolerance, impaired  balance, decreased endurance, and decreased mobility.  Pt prognosis for achieving goals is good, pending pt progress with hospitalization/medical status improvements, and indicated by motivated to participate in therapy, ability to follow cues, and improvement with mobility status. PT will continue to see pt during current hospitalization in order to address the deficits listed above and provide interventions consistent w/ POC in effort to achieve goals. Current goals and POC remain appropriate, pt continues to have rehab potential  Upon conclusion pt seated OOB  in recliner. The patient's AM-PAC Basic Mobility Inpatient Short Form Raw Score is 19.  Based on patient presentations and impairments, pt would most appropriately benefit from Level 3 resource intensity upon discharge.  Please also refer to the recommendation of the Physical Therapist for safe discharge planning.  Barriers to Discharge: None     Rehab Resource Intensity Level, PT: III (Minimum Resource Intensity)    See flowsheet documentation for full assessment.

## 2024-03-16 NOTE — ASSESSMENT & PLAN NOTE
Suspect pre-renal azotemia however patient appears to be in volume overloaded state. Patient reports using furosemide 40 mg twice daily for the past month due to increasing lower extremity edema  Initially received IV hydration  CT of abdomen and pelvis: No hydronephrosis   Nephrology recommendations appreciated: Continue furosemide 40 mg IV twice daily. Net negative 3,110 mL  Daily metabolic panel and trend creatinine  Avoid further nephrotoxins and hypotension  Monitor I&O

## 2024-03-17 NOTE — RESPIRATORY THERAPY NOTE
03/16/24 6800   Respiratory Assessment   Resp Comments pt placed on own preset cpap machine with 3L   O2 Device cpap   Non-Invasive Information   O2 Interface Device Nasal mask   Non-Invasive Ventilation Mode CPAP   SpO2 96 %   $ Pulse Oximetry Spot Check Charge Completed   Non-Invasive Settings   FiO2 (%)   (with 3L)   PEEP/CPAP (cm H2O)   (preset)

## 2024-03-17 NOTE — PLAN OF CARE
Problem: Nutrition/Hydration-ADULT  Goal: Nutrient/Hydration intake appropriate for improving, restoring or maintaining nutritional needs  Description: Monitor and assess patient's nutrition/hydration status for malnutrition. Collaborate with interdisciplinary team and initiate plan and interventions as ordered.  Monitor patient's weight and dietary intake as ordered or per policy. Utilize nutrition screening tool and intervene as necessary. Determine patient's food preferences and provide high-protein, high-caloric foods as appropriate.     INTERVENTIONS:  - Monitor oral intake, urinary output, labs, and treatment plans  - Assess nutrition and hydration status and recommend course of action  - Evaluate amount of meals eaten  - Assist patient with eating if necessary   - Allow adequate time for meals  - Recommend/ encourage appropriate diets, oral nutritional supplements, and vitamin/mineral supplements  - Order, calculate, and assess calorie counts as needed  - Assess need for intravenous fluids  - Provide specific nutrition/hydration education as appropriate  - Include patient/family/caregiver in decisions related to nutrition  Outcome: Progressing   Dietary supplements ordered.

## 2024-03-17 NOTE — ASSESSMENT & PLAN NOTE
Suspect pre-renal azotemia however patient appears to be in volume overloaded state. Patient reports using furosemide 40 mg twice daily for the past month due to increasing lower extremity edema  Initially received IV hydration  CT of abdomen and pelvis: No hydronephrosis   Nephrology recommendations appreciated: Continue furosemide 40 mg IV twice daily. Net negative 3,872.8 mL  Daily metabolic panel and trend creatinine  Avoid further nephrotoxins and hypotension  Monitor I&O

## 2024-03-17 NOTE — ASSESSMENT & PLAN NOTE
Wt Readings from Last 3 Encounters:   03/17/24 72.7 kg (160 lb 4.4 oz)   03/11/24 73.4 kg (161 lb 12.8 oz)   03/04/24 68 kg (150 lb)     Acute on chronic CHF  Decompensated but with lower extremity edema, fluid overload, pleural effusion    CT AP 3/13/2024: With persistent moderate right pleural effusion.  Echocardiogram 8/18/23: LVEF of 35%.  BNP >4700  IR thoracentesis 3/14/2024 with 600 ml of pleural fluid removed  Continue metoprolol succinate 12.5 mg p.o. daily  Continue diuretics per nephrology   Low sodium diet, daily weights, I&O

## 2024-03-17 NOTE — ASSESSMENT & PLAN NOTE
Malnutrition Findings:   Adult Malnutrition type: Acute illness  Adult Degree of Malnutrition: Other severe protein calorie malnutrition  Malnutrition Characteristics: Muscle loss, Inadequate energy, Weight loss                  360 Statement: Pt exhibits wevere malnutrition as evidenced by 12% wt loss x 6 months due to inadequate kcal intakes per pt to be treated with dysphagia diet and supplements.    BMI Findings:           Body mass index is 24.37 kg/m².

## 2024-03-17 NOTE — PROGRESS NOTES
"Progress Note - Nephrology   Seun Alva 78 y.o. male MRN: [id number]  Unit/Bed#: -01 Encounter: [id number]    A/P:  Acute kidney injury on CKD, POA.  Admission Cr 2.3mg/dL.  Etiology suspected 2/2 hemodynamic/fluid shifts.    Cr slightly trending down to 2.0.  Patient did not receive IV lasix on 3/16 due to low BP parameters, missed dose this AM as well. He still has evidence of volume overload as evidenced by leg edema and given his diuretic tolerability previously, will continue IV for another 24 hr. Recommend lasix 80mg IV x1 with hold parameter for SBP<100.  Please notify nephrology if diuretic withheld.    2.  CKD 3 with baseline creatinine 1.3-1.6 Mg per deciliter.  Continue outpatient nephrology follow-up.  Hold ACE/ARB/SGLT2 inhibitor with acute kidney injury.  Patient was on Jardiance 10 mg as outpatient, look to resume.     3.  Heart failure with reduced ejection fraction  Patient had a CT on admission that showed pleural effusion required a thoracentesis 3/14.    Continue to follow daily weights, urine output and adhere to dietary restrictions.  Weight down to 160 lb. Patient did not receive last 3 doses of lasix due to low BP parameters.  Will dose 80mg x1, cumulative dose today would have been 80mg.  Continue midodrine 5mg TID.     4. Hypotension  Continue midodrine 5mg TID.     5.Hypokalemia, mild, due to diuretics  KCL 20meq x 1.      Follow up reason for today's visit:     CHEYANNE (acute kidney injury) (HCC)      Subjective:   Patient seen and examined today. CO cramping RLQ at times and had BRBPR in stool x1 today.  A complete 10 point review of systems was performed and is otherwise negative.     Objective:     Vitals: Blood pressure 118/66, pulse 95, temperature (!) 97.4 °F (36.3 °C), resp. rate 18, height 5' 8\" (1.727 m), weight 72.7 kg (160 lb 4.4 oz), SpO2 97%.,Body mass index is 24.37 kg/m².    Weight (last 2 days)       Date/Time Weight    03/17/24 0600 72.7 (160.27)    03/16/24 1508 " "73.3 (161.6)    03/16/24 0730 73.3 (161.6)    03/16/24 0600 73.8 (162.7)    03/15/24 0600 73.8 (162.7)              Intake/Output Summary (Last 24 hours) at 3/17/2024 1514  Last data filed at 3/17/2024 0900  Gross per 24 hour   Intake 720 ml   Output 700 ml   Net 20 ml     I/O last 3 completed shifts:  In: 960 [P.O.:960]  Out: 3175 [Urine:3175]         Physical Exam: /66   Pulse 95   Temp (!) 97.4 °F (36.3 °C)   Resp 18   Ht 5' 8\" (1.727 m)   Wt 72.7 kg (160 lb 4.4 oz)   SpO2 97%   BMI 24.37 kg/m²     General Appearance:    Alert, cooperative, no distress, appears stated age   Head:    Normocephalic, without obvious abnormality, atraumatic   Eyes:    Conjunctiva/corneas clear   Ears:    Normal external ears   Nose:   Nares normal, septum midline, mucosa normal, no drainage    or sinus tenderness   Throat:   Lips, mucosa, and tongue normal; teeth and gums normal   Neck:    Back:      Lungs:     Clear to auscultation bilaterally, respirations unlabored   Chest wall:    No tenderness or deformity   Heart:    Regular rate and rhythm, S1 and S2 normal, no murmur, rub   or gallop   Abdomen:     Soft, non-tender, bowel sounds active   Extremities:   +1-2 bl le edema   Skin:   Skin color, texture, turgor normal, no rashes or lesions       Neurologic:  Hard of hearing            Lab, Imaging and other studies: I have personally reviewed pertinent labs.  CBC:   Lab Results   Component Value Date    WBC 8.11 03/17/2024    HGB 13.2 03/17/2024    HCT 42.6 03/17/2024     (H) 03/17/2024     (L) 03/17/2024    RBC 4.23 03/17/2024    MCH 31.2 03/17/2024    MCHC 31.0 (L) 03/17/2024    RDW 15.8 (H) 03/17/2024    MPV 11.3 03/17/2024     CMP:   Lab Results   Component Value Date    K 3.8 03/17/2024     03/17/2024    CO2 31 03/17/2024    BUN 32 (H) 03/17/2024    CREATININE 2.05 (H) 03/17/2024    CALCIUM 8.3 (L) 03/17/2024    EGFR 29 03/17/2024       .  Results from last 7 days   Lab Units 03/17/24  0449 " "03/16/24  0459 03/15/24  0441 03/14/24  0451 03/13/24  1414 03/12/24  1405   POTASSIUM mmol/L 3.8 3.1* 3.8 4.1 4.7 4.7   CHLORIDE mmol/L 101 99 100 101 99 99   CO2 mmol/L 31 30 25 26 28 29   BUN mg/dL 32* 36* 37* 36* 35* 37*   CREATININE mg/dL 2.05* 2.30* 2.43* 2.28* 2.30* 2.34*   CALCIUM mg/dL 8.3* 8.4 8.4 8.5 9.2 9.2   ALK PHOS U/L  --   --   --  115* 136* 123*   ALT U/L  --   --   --  65* 82* 94*   AST U/L  --   --   --  34 44* 46*         Phosphorus: No results found for: \"PHOS\"  Magnesium:   Lab Results   Component Value Date    MG 1.9 03/17/2024     Urinalysis: No results found for: \"COLORU\", \"CLARITYU\", \"SPECGRAV\", \"PHUR\", \"LEUKOCYTESUR\", \"NITRITE\", \"PROTEINUA\", \"GLUCOSEU\", \"KETONESU\", \"BILIRUBINUR\", \"BLOODU\"  Ionized Calcium: No results found for: \"CAION\"  Coagulation: No results found for: \"PT\", \"INR\", \"APTT\"  Troponin: No results found for: \"TROPONINI\"  ABG: No results found for: \"PHART\", \"UNX3BTG\", \"PO2ART\", \"YSR7QVB\", \"N2ELXRAL\", \"BEART\", \"SOURCE\"  Radiology review:     IMAGING  No results found.    Current Facility-Administered Medications   Medication Dose Route Frequency    ALPRAZolam (XANAX) tablet 0.25 mg  0.25 mg Oral HS PRN    aspirin chewable tablet 81 mg  81 mg Oral HS    budesonide (PULMICORT) inhalation solution 0.5 mg  0.5 mg Nebulization BID    cholecalciferol (VITAMIN D3) tablet 1,000 Units  1,000 Units Oral Daily    dextromethorphan-guaiFENesin (ROBITUSSIN DM) oral syrup 5 mL  5 mL Oral TID PRN    famotidine (PEPCID) tablet 10 mg  10 mg Oral HS    fluticasone (FLONASE) 50 mcg/act nasal spray 1 spray  1 spray Nasal BID    formoterol (PERFOROMIST) nebulizer solution 20 mcg  20 mcg Nebulization BID    furosemide (LASIX) injection 40 mg  40 mg Intravenous BID (diuretic)    heparin (porcine) subcutaneous injection 5,000 Units  5,000 Units Subcutaneous Q8H SVETLANA    HYDROcodone-acetaminophen (NORCO) 5-325 mg per tablet 1 tablet  1 tablet Oral Q6H PRN    ipratropium (ATROVENT) 0.02 % inhalation " solution 0.5 mg  0.5 mg Nebulization TID    levalbuterol (XOPENEX) inhalation solution 1.25 mg  1.25 mg Nebulization TID    lidocaine (LIDODERM) 5 % patch 2 patch  2 patch Topical Daily    methocarbamol (ROBAXIN) tablet 500 mg  500 mg Oral TID PRN    metoprolol succinate (TOPROL-XL) 24 hr tablet 12.5 mg  12.5 mg Oral Daily    midodrine (PROAMATINE) tablet 5 mg  5 mg Oral TID AC    pantoprazole (PROTONIX) EC tablet 20 mg  20 mg Oral Early Morning    saliva substitute (MOUTH KOTE) mucosal solution 1 spray  1 spray Mouth/Throat Q4H PRN    sertraline (ZOLOFT) tablet 50 mg  50 mg Oral Daily    sodium chloride (OCEAN) 0.65 % nasal spray 1 spray  1 spray Each Nare Q1H PRN     Medications Discontinued During This Encounter   Medication Reason    oxygen gas     ipratropium-albuterol (DUO-NEB) 0.5-2.5 mg/3 mL inhalation solution 3 mL     sodium chloride 0.9 % infusion     lidocaine (LIDODERM) 5 % patch 1 patch     furosemide (LASIX) injection 40 mg     furosemide (LASIX) injection 40 mg     furosemide (LASIX) injection 40 mg     midodrine (PROAMATINE) tablet 2.5 mg     midodrine (PROAMATINE) tablet 2.5 mg     furosemide (LASIX) injection 40 mg        Debbie Hancock,       This progress note was produced in part using a dictation device which may document imprecise wording from author's original intent.

## 2024-03-17 NOTE — PROGRESS NOTES
[facility]  Progress Note  Name: Seun Alva I  MRN: [id number]  Unit/Bed#: -01 I Date of Admission: 3/13/2024   Date of Service: 3/17/2024 I Hospital Day: 3    Assessment/Plan     * CHEYANNE (acute kidney injury) ([facility])  Assessment & Plan  Suspect pre-renal azotemia however patient appears to be in volume overloaded state. Patient reports using furosemide 40 mg twice daily for the past month due to increasing lower extremity edema  Initially received IV hydration  CT of abdomen and pelvis: No hydronephrosis   Nephrology recommendations appreciated: Continue furosemide 40 mg IV twice daily. Net negative 3,872.8 mL  Daily metabolic panel and trend creatinine  Avoid further nephrotoxins and hypotension  Monitor I&O    Congestive heart failure with left ventricular systolic dysfunction (LVSD) ([facility])  Assessment & Plan  Wt Readings from Last 3 Encounters:   03/17/24 72.7 kg (160 lb 4.4 oz)   03/11/24 73.4 kg (161 lb 12.8 oz)   03/04/24 68 kg (150 lb)     Acute on chronic CHF  Decompensated but with lower extremity edema, fluid overload, pleural effusion    CT AP 3/13/2024: With persistent moderate right pleural effusion.  Echocardiogram 8/18/23: LVEF of 35%.  BNP >4700  IR thoracentesis 3/14/2024 with 600 ml of pleural fluid removed  Continue Toprol succinate 12.5 mg p.o. daily  Continue diuretics per nephrology   Low sodium diet, daily weights, I&O           Aspiration into lower respiratory tract  Assessment & Plan  CT reveals overlying atelectasis with evidence of aspiration of hyperdense material  Speech evaluation appreciated: Modified diet  Aspiration precautions     Severe protein-calorie malnutrition (HCC)  Assessment & Plan  Malnutrition Findings:   Adult Malnutrition type: Acute illness  Adult Degree of Malnutrition: Other severe protein calorie malnutrition  Malnutrition Characteristics: Muscle loss, Inadequate energy, Weight loss                  360 Statement: Pt sky torres  malnutrition as evidenced by 12% wt loss x 6 months due to inadequate kcal intakes per pt to be treated with dysphagia diet and supplements.    BMI Findings:           Body mass index is 24.37 kg/m².       Transaminitis  Assessment & Plan  CT of abdomen and pelvis without biliary tree dilatation. Possible related congestive hepatopathy. Improving  Monitor LFTs closely   Hold statin for now      Essential hypertension  Assessment & Plan  BP reviewed and soft   He was started on midodrine as an outpatient  Continue Toprol 12.5 mg daily with holding parameters  Monitor blood pressure closely and adjust medication as indicated    Chronic respiratory failure with hypoxia (HCC)  Assessment & Plan  History of COPD  Chronically wears 2-3L NC       LAMBERTO on CPAP  Assessment & Plan  CPAP at bedtime              VTE Pharmacologic Prophylaxis: VTE Score: 5 High Risk (Score >/= 5) - Pharmacological DVT Prophylaxis Ordered: heparin. Sequential Compression Devices Ordered.    Mobility:   Basic Mobility Inpatient Raw Score: 19  JH-HLM Goal: 6: Walk 10 steps or more  JH-HLM Achieved: 3: Sit at edge of bed  JH-HLM Goal NOT achieved. Continue with multidisciplinary rounding and encourage appropriate mobility to improve upon JH-HLM goals.    Patient Centered Rounds: I performed bedside rounds with nursing staff today.   Discussions with Specialists or Other Care Team Provider: Nephrology    Education and Discussions with Family / Patient: Updated  (daughter) at bedside.    Total Time Spent on Date of Encounter in care of patient: 34 mins. This time was spent on one or more of the following: performing physical exam; counseling and coordination of care; obtaining or reviewing history; documenting in the medical record; reviewing/ordering tests, medications or procedures; communicating with other healthcare professionals and discussing with patient's family/caregivers.    Current Length of Stay: 3 day(s)  Current Patient  Status: Inpatient   Certification Statement: The patient will continue to require additional inpatient hospital stay due to CHF and CHEYANNE  Discharge Plan: Anticipate discharge tomorrow to home with home services.    Code Status: Level 3 - DNAR and DNI    Subjective:   Patient seen and examined.  He is feeling better and has no complaints offered.     Objective:     Vitals:   Temp (24hrs), Av.3 °F (36.3 °C), Min:97.2 °F (36.2 °C), Max:97.4 °F (36.3 °C)    Temp:  [97.2 °F (36.2 °C)-97.4 °F (36.3 °C)] 97.4 °F (36.3 °C)  HR:  [77-98] 77  Resp:  [18] 18  BP: ()/(55-63) 94/56  SpO2:  [94 %-100 %] 96 %  Body mass index is 24.37 kg/m².     Input and Output Summary (last 24 hours):     Intake/Output Summary (Last 24 hours) at 3/17/2024 1050  Last data filed at 3/17/2024 0601  Gross per 24 hour   Intake 480 ml   Output 1000 ml   Net -520 ml       Physical Exam:   Physical Exam  Vitals and nursing note reviewed.   Constitutional:       General: He is not in acute distress.     Appearance: He is not ill-appearing.   HENT:      Head: Normocephalic and atraumatic.      Right Ear: Decreased hearing noted.      Left Ear: Decreased hearing noted.      Mouth/Throat:      Pharynx: Oropharynx is clear.   Eyes:      Pupils: Pupils are equal, round, and reactive to light.   Cardiovascular:      Rate and Rhythm: Normal rate and regular rhythm.      Pulses: Normal pulses.      Heart sounds: Normal heart sounds.   Pulmonary:      Effort: Pulmonary effort is normal. No respiratory distress.      Breath sounds: Normal breath sounds.   Abdominal:      General: Bowel sounds are normal.      Palpations: Abdomen is soft.      Tenderness: There is no abdominal tenderness.   Musculoskeletal:      Cervical back: Neck supple.      Right lower leg: Edema present.      Left lower leg: Edema present.      Comments: Edema improving   Skin:     General: Skin is warm and dry.      Capillary Refill: Capillary refill takes less than 2 seconds.    Neurological:      General: No focal deficit present.      Mental Status: He is alert and oriented to person, place, and time. Mental status is at baseline.        Additional Data:     Labs:  Results from last 7 days   Lab Units 03/17/24 0449 03/14/24 0451 03/13/24  1414   WBC Thousand/uL 8.11   < > 9.84   HEMOGLOBIN g/dL 13.2   < > 14.2   HEMATOCRIT % 42.6   < > 46.9   PLATELETS Thousands/uL 146*   < > 177   NEUTROS PCT %  --   --  87*   LYMPHS PCT %  --   --  5*   MONOS PCT %  --   --  7   EOS PCT %  --   --  0    < > = values in this interval not displayed.     Results from last 7 days   Lab Units 03/17/24  0449 03/15/24  0441 03/14/24  0451   SODIUM mmol/L 140   < > 137   POTASSIUM mmol/L 3.8   < > 4.1   CHLORIDE mmol/L 101   < > 101   CO2 mmol/L 31   < > 26   BUN mg/dL 32*   < > 36*   CREATININE mg/dL 2.05*   < > 2.28*   ANION GAP mmol/L 8   < > 10   CALCIUM mg/dL 8.3*   < > 8.5   ALBUMIN g/dL  --   --  3.4*   TOTAL BILIRUBIN mg/dL  --   --  0.64   ALK PHOS U/L  --   --  115*   ALT U/L  --   --  65*   AST U/L  --   --  34   GLUCOSE RANDOM mg/dL 96   < > 127    < > = values in this interval not displayed.                       Lines/Drains:  Invasive Devices       Peripheral Intravenous Line  Duration             Peripheral IV 03/17/24 Left;Upper;Ventral (anterior) Arm <1 day                    Recent Cultures (last 7 days):   Results from last 7 days   Lab Units 03/14/24  1102   GRAM STAIN RESULT  1+ Polys  No bacteria seen   BODY FLUID CULTURE, STERILE  No growth       Last 24 Hours Medication List:   Current Facility-Administered Medications   Medication Dose Route Frequency Provider Last Rate    ALPRAZolam  0.25 mg Oral HS PRN INOCENTE Andrea      aspirin  81 mg Oral HS INOCENTE Andrea      budesonide  0.5 mg Nebulization BID INOCENTE Andrea      cholecalciferol  1,000 Units Oral Daily INOCENTE Andrea      dextromethorphan-guaiFENesin  5 mL Oral TID PRN Tricia Denice, CRNP       famotidine  10 mg Oral HS INOCENTE Andrea      fluticasone  1 spray Nasal BID Tricia Galdamez, INOCENTE      formoterol  20 mcg Nebulization BID INOCENTE Andrea      furosemide  40 mg Intravenous BID (diuretic) INOCENTE Andrea      heparin (porcine)  5,000 Units Subcutaneous Q8H SVETLANA INOCENTE Andrea      HYDROcodone-acetaminophen  1 tablet Oral Q6H PRN INOCENTE Andrea      ipratropium  0.5 mg Nebulization TID Hyacinth Espana MD      levalbuterol  1.25 mg Nebulization TID Hyacinth Espana MD      lidocaine  2 patch Topical Daily INOCENTE Andrea      methocarbamol  500 mg Oral TID PRN INOCENTE Andrea      metoprolol succinate  12.5 mg Oral Daily INOCENTE Andrea      midodrine  5 mg Oral TID AC INOCENTE Andrea      pantoprazole  20 mg Oral Early Morning INOCENTE Andrea      saliva substitute  1 spray Mouth/Throat Q4H PRN INOCENTE Andrea      sertraline  50 mg Oral Daily INOCENTE Andrea      sodium chloride  1 spray Each Nare Q1H PRN INOCENTE Andrea          Today, Patient Was Seen By: INOCENTE Shoemaker    **Please Note: This note may have been constructed using a voice recognition system.**

## 2024-03-18 NOTE — CASE MANAGEMENT
Case Management Discharge Planning Note    Patient name Seun Alva  Location /-01 MRN [id number]  : 1945 Date 3/18/2024       Current Admission Date: 3/13/2024  Current Admission Diagnosis:CHEYANNE (acute kidney injury) ([facility])   Patient Active Problem List    Diagnosis Date Noted    Severe protein-calorie malnutrition ([facility]) 2024    CHEYANNE (acute kidney injury) ([facility]) 2024    Essential hypertension 2024    Aspiration into lower respiratory tract 2024    Transaminitis 2024    Anxiety 2024    Platelets decreased ([facility]) 2024    Acute on chronic combined systolic (congestive) and diastolic (congestive) heart failure ([facility]) 2024    Left eye pain 2023    Intermittent constipation 2023    Chronic systolic congestive heart failure ([facility]) 10/22/2023    Erectile dysfunction 2023    Other disorders of refraction 2023    Occlusion and stenosis of unspecified carotid artery 2023    Dysphagia 2023    Congestive heart failure with left ventricular systolic dysfunction (LVSD) ([facility]) 2023    Intracranial aneurysm 2023    Allergic rhinitis, unspecified 2023    Chronic kidney disease, stage 3a ([facility]) 2023    Generalized muscle weakness 2023    Hypertensive heart and chronic kidney disease with heart failure and stage 1 through stage 4 chronic kidney disease, or unspecified chronic kidney disease ([facility]) 2023    Need for assistance with personal care 2023    Other abnormalities of gait and mobility 2023    Sudden idiopathic hearing loss, right ear 2023    Acute and chronic respiratory failure with hypoxia ([facility]) 2023    Elevated troponin 2023    Orthopnea 2023    Lower extremity edema 2023    Irregular heart rhythm 2023    Left leg pain 2023    Transient right leg weakness 2023    COPD, severe ([facility]) 2023    Sensorineural hearing loss,  bilateral 10/19/2022    Chronic respiratory failure with hypoxia (HCC) 10/15/2022    Prostate cancer (HCC) 09/27/2022    Elevated MCV 09/08/2022    Cubital tunnel syndrome on left 07/22/2022    Carpal tunnel syndrome on left 07/22/2022    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    Incomplete bladder emptying 04/27/2022    Chronic bilateral low back pain without sciatica 04/18/2022    Mid back pain 04/18/2022    Thoracic radiculopathy 04/18/2022    Chronic pain syndrome 04/18/2022    Hoarseness or changing voice 04/14/2022    Chronic right-sided thoracic back pain 03/05/2022    Primary insomnia 03/02/2022    Right hip pain 01/20/2022    Abnormal nuclear stress test 03/18/2021    Costochondral chest pain 01/15/2021    COPD with acute exacerbation (HCC) 01/11/2021    Oxygen dependent 01/11/2021    S/P carotid endarterectomy 01/11/2021    Stented coronary artery 01/11/2021    Anisometropia 01/11/2021    Osteoarthritis of spinal facet joint 01/11/2021    Chronic ischemic heart disease 01/11/2021    Diverticular disease of colon 01/11/2021    Dry eye syndrome 01/11/2021    GERD (gastroesophageal reflux disease) 01/11/2021    Acute hearing loss, right 01/11/2021    Elevated PSA 01/11/2021    Lens replaced by other means 01/11/2021    Lumbar radiculopathy 01/11/2021    Multinodular goiter 01/11/2021    Nuclear senile cataract 01/11/2021    Occlusion and stenosis of carotid artery 01/11/2021    Osteoarthritis of hip 01/11/2021    Prediabetes 01/11/2021    LAMBERTO on CPAP 01/11/2021    Solitary pulmonary nodule 01/11/2021    Thyroid nodule 01/11/2021    Guyon syndrome, 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  [address]  [place] [address]  Phone: [phone] Fax: [phone]    [facility] - [place], [place] - [address]  [address]  [facility] [address]  [facility] [address]  Phone: [phone] Fax: [phone]    Primary Care Provider: Sarahy Clifford DO    Primary Insurance: [facility] REP  Secondary Insurance:     DISCHARGE DETAILS:                                Requested Home Health Care         Home Health Discipline requested:: Nursing, Physical Therapy, Occupational Therapy                                                       IMM Given (Date):: 03/18/24  IMM Given to:: Family  Family notified:: Daughter; Temitope Hawkins  Additional Comments: CM provided pt with choice list. CM spoke with Pt's daughter to review HHC options. CM reserved  HHC via [facility] at the request of pt and pt's daughter.

## 2024-03-18 NOTE — NURSING NOTE
Went over discharge info with patient and with patient daughter. All questions answered. PCA took patient down to main lobby via wheel chair.   36.6

## 2024-03-18 NOTE — RESPIRATORY THERAPY NOTE
03/17/24 2907   Respiratory Assessment   Resp Comments pt placed on own preset machine with 3L   O2 Device cpap   Non-Invasive Information   O2 Interface Device Nasal mask   Non-Invasive Ventilation Mode CPAP   SpO2 96 %   $ Pulse Oximetry Spot Check Charge Completed   Non-Invasive Settings   FiO2 (%)   (3L)   PEEP/CPAP (cm H2O)   (preset)

## 2024-03-18 NOTE — PROGRESS NOTES
Patient:    MRN:  [id number]    Nicholein Request ID:  [id number]    Level of care reserved:  Home Health Agency    Partner Reserved:  [facility], [place] [address] [phone]    Clinical needs requested:    Geography searched:  [id number]    Start of Service:    Request sent:  12:39pm EDT on 3/15/2024 by Nadine Toure    Partner reserved:  8:27am EDT on 3/18/2024 by Nadine Toure    Choice list shared:  8:23am EDT on 3/18/2024 by Nadine Toure

## 2024-03-18 NOTE — PLAN OF CARE
Problem: Prexisting or High Potential for Compromised Skin Integrity  Goal: Skin integrity is maintained or improved  Description: INTERVENTIONS:  - Identify patients at risk for skin breakdown  - Assess and monitor skin integrity  - Assess and monitor nutrition and hydration status  - Monitor labs   - Assess for incontinence   - Turn and reposition patient  - Assist with mobility/ambulation  - Relieve pressure over bony prominences  - Avoid friction and shearing  - Provide appropriate hygiene as needed including keeping skin clean and dry  - Evaluate need for skin moisturizer/barrier cream  - Collaborate with interdisciplinary team   - Patient/family teaching  - Consider wound care consult   Outcome: Progressing   Moisturizer applied to scabs on arms.

## 2024-03-18 NOTE — DISCHARGE SUMMARY
"Formerly [facility]  Discharge- Seun Alva 1945, 78 y.o. male MRN: [id number]  Unit/Bed#: -01 Encounter: [id number]  Primary Care Provider: Sarahy Clifford DO   Date and time admitted to hospital: 3/13/2024  1:42 PM    No new Assessment & Plan notes have been filed under this hospital service since the last note was generated.  Service: Hospitalist    Medical Problems       Resolved Problems  Date Reviewed: 3/17/2024   None       Discharging Physician / Practitioner: INOCENTE Shoemaker  PCP: Sarahy Clifford DO  Admission Date:   Admission Orders (From admission, onward)       Ordered        03/14/24 1230  Inpatient Admission  Once            03/13/24 1544  Place in Observation  Once                          Discharge Date: 03/18/24    Consultations During Hospital Stay:  ***    Procedures Performed:   ***    Significant Findings / Test Results:   ***    Incidental Findings:   ***   {SLIM Discharge Incidential Findings:97408}    Test Results Pending at Discharge (will require follow up):   ***     Outpatient Tests Requested:  ***    Complications:  ***    Reason for Admission: ***    Hospital Course:   Seun Alva is a 78 y.o. male patient who originally presented to the hospital on 3/13/2024 due to ***    {Complete this smartphrase if the patient is an inpatient and being discharged earlier than 2 midnights. If this does not apply, please delete this line:16829}    Please see above list of diagnoses and related plan for additional information.     Condition at Discharge: {Condition:97924}    Discharge Day Visit / Exam:   Subjective:  ***  Vitals: Blood Pressure: 101/54 (03/18/24 0936)  Pulse: 93 (03/18/24 0936)  Temperature: 98 °F (36.7 °C) (03/18/24 0710)  Temp Source: Axillary (03/18/24 0710)  Respirations: 18 (03/18/24 0936)  Height: 5' 8\" (172.7 cm) (03/13/24 1631)  Weight - Scale: 70 kg (154 lb 5.2 oz) (03/18/24 0555)  SpO2: 97 % (03/18/24 0936)  Exam:   Physical " Exam ***    Discussion with Family: {Family Communication:16858}    Discharge instructions/Information to patient and family:   See after visit summary for information provided to patient and family.      Provisions for Follow-Up Care:  See after visit summary for information related to follow-up care and any pertinent home health orders.      Mobility at time of Discharge:   Basic Mobility Inpatient Raw Score: 19  JH-HLM Goal: 6: Walk 10 steps or more  JH-HLM Achieved: 4: Move to chair/commode  {Mobility:70126}     Disposition:   {Disposition on Discharge:11222}    Planned Readmission: ***     Discharge Statement:  I spent *** minutes discharging the patient. This time was spent on the day of discharge. I had direct contact with the patient on the day of discharge. Greater than 50% of the total time was spent examining patient, answering all patient questions, arranging and discussing plan of care with patient as well as directly providing post-discharge instructions.  Additional time then spent on discharge activities.    Discharge Medications:  See after visit summary for reconciled discharge medications provided to patient and/or family.      **Please Note: This note may have been constructed using a voice recognition system**  {[facility] DISCHARGE NOTE TEMPLATES:50635}       "material.  He was evaluated by speech.  He was seen by nephrology needed on IV diuresis.  His midodrine was increased.  He had a right thoracentesis in IR for 600 mL of pleural fluid.  He was evaluated by therapy with recommendation for minimum resource intensity.  His lower extremity edema improved significantly.  He denied shortness of breath.  He was on his baseline oxygen requirements. He was discharged home in stable condition with home health care.  This is a brief discharge summary.  Please see full patient chart for additional details.     Condition at Discharge: stable    Discharge Day Visit / Exam:   Subjective:  Patient seen and examined. Feeling better.  Lower extremity edema is improved.   Vitals: Blood Pressure: 111/74 (03/18/24 1442)  Pulse: 101 (03/18/24 1442)  Temperature: (!) 97.4 °F (36.3 °C) (03/18/24 1442)  Temp Source: Axillary (03/18/24 1442)  Respirations: 18 (03/18/24 1442)  Height: 5' 8\" (172.7 cm) (03/13/24 1631)  Weight - Scale: 70 kg (154 lb 5.2 oz) (03/18/24 0555)  SpO2: 98 % (03/18/24 1442)    Exam:   Physical Exam  Vitals and nursing note reviewed.   Constitutional:       Comments: Chronically ill-appearing and frail   HENT:      Head: Normocephalic and atraumatic.      Right Ear: Decreased hearing noted.      Left Ear: Decreased hearing noted.      Nose: Nose normal.      Mouth/Throat:      Mouth: Mucous membranes are moist.      Pharynx: Oropharynx is clear.   Eyes:      Pupils: Pupils are equal, round, and reactive to light.   Cardiovascular:      Rate and Rhythm: Normal rate and regular rhythm.      Pulses: Normal pulses.      Heart sounds: Normal heart sounds.   Pulmonary:      Effort: Pulmonary effort is normal. No respiratory distress.      Breath sounds: Decreased breath sounds present. No wheezing or rales.   Abdominal:      General: Bowel sounds are normal.      Palpations: Abdomen is soft.      Tenderness: There is no abdominal tenderness.   Musculoskeletal:         " General: Swelling present.      Cervical back: Neck supple.      Comments: 1+ bilateral lower extremity pitting edema, greatly improved   Skin:     General: Skin is warm and dry.      Capillary Refill: Capillary refill takes less than 2 seconds.   Neurological:      General: No focal deficit present.      Mental Status: He is alert and oriented to person, place, and time. Mental status is at baseline.          Discussion with Family: Updated  (daughter) via phone.    Discharge instructions/Information to patient and family:   See after visit summary for information provided to patient and family.      Provisions for Follow-Up Care:  See after visit summary for information related to follow-up care and any pertinent home health orders.      Mobility at time of Discharge:   Basic Mobility Inpatient Raw Score: 19  JH-HLM Goal: 6: Walk 10 steps or more  JH-HLM Achieved: 4: Move to chair/commode  HLM Goal NOT achieved. Continue to encourage mobility in post discharge setting.     Disposition:   Home    Planned Readmission: No     Discharge Statement:  I spent 40 minutes discharging the patient. This time was spent on the day of discharge. I had direct contact with the patient on the day of discharge. Greater than 50% of the total time was spent examining patient, answering all patient questions, arranging and discussing plan of care with patient as well as directly providing post-discharge instructions.  Additional time then spent on discharge activities.    Discharge Medications:  See after visit summary for reconciled discharge medications provided to patient and/or family.      **Please Note: This note may have been constructed using a voice recognition system**

## 2024-03-18 NOTE — PROGRESS NOTES
Progress Note - Nephrology   Seun Alva 78 y.o. male MRN: [id number]  Unit/Bed#: -01 Encounter: [id number]    A/P:  1.  Acute kidney injury on top of chronic kidney disease   Creatinine slowly improving, now down to 1.9 mg/dL from 2.3 mg/dL 2 days ago.  Continue to optimize care and avoid potential for toxins.  Patient has been responding well with diuresis, would continue diuresis at this time.  Recommend reinitiation of Jardiance given overall clinical course of care at this time.  Patient currently not on IV diuretics, will reinitiate furosemide 40 mg daily along with the Jardiance 10 mg daily.  2.  Chronic kidney disease stage IIIb with baseline creatinine between 1.3-1.6 mg/dL  3.  Chronic systolic congestive heart failure   As noted above, patient has improved, will transition from furosemide 80 mg IV daily to Jardiance 10 mg daily and furosemide 40 mg by mouth daily.  Consider more aggressive diuretic regimen depending on progresses from a clinical standpoint.  Continue low-sodium diet.  4.  Pleural effusion   Status post thoracentesis during this admission, continue to monitor the patient clinically.  5.  Hypotension   Patient currently on midodrine, continue for now and consider adjustment in dose depending progresses well clinical standpoint.    Follow up reason for today's visit: Acute injury/chronic kidney disease/volume management    CHEYANNE (acute kidney injury) (HCC)    Patient Active Problem List   Diagnosis    Hyperlipidemia    Coronary artery disease involving native coronary artery of native heart without angina pectoris    Left carotid artery occlusion    COPD with acute exacerbation (HCC)    Oxygen dependent    Depression, recurrent (HCC)    S/P carotid endarterectomy    Stented coronary artery    Anisometropia    Osteoarthritis of spinal facet joint    Chronic ischemic heart disease    Diverticular disease of colon    Dry eye syndrome    GERD (gastroesophageal reflux disease)    Acute  hearing loss, right    Elevated PSA    Lens replaced by other means    Lumbar radiculopathy    Multinodular goiter    Nuclear senile cataract    Occlusion and stenosis of carotid artery    Osteoarthritis of hip    Prediabetes    LAMBERTO on CPAP    Solitary pulmonary nodule    Thyroid nodule    Guyon syndrome, left    Costochondral chest pain    Abnormal nuclear stress test    Right hip pain    Primary insomnia    Chronic right-sided thoracic back pain    Hoarseness or changing voice    Chronic bilateral low back pain without sciatica    Mid back pain    Thoracic radiculopathy    Chronic pain syndrome    Incomplete bladder emptying    Intercostal neuralgia    Cubital tunnel syndrome on left    Carpal tunnel syndrome on left    Elevated MCV    Prostate cancer (HCC)    Chronic respiratory failure with hypoxia ([facility])    Sensorineural hearing loss, bilateral    COPD, severe (HCC)    Left leg pain    Transient right leg weakness    Orthopnea    Lower extremity edema    Irregular heart rhythm    Elevated troponin    Intracranial aneurysm    Congestive heart failure with left ventricular systolic dysfunction (LVSD) ([facility])    Dysphagia    Erectile dysfunction    Allergic rhinitis, unspecified    Chronic kidney disease, stage 3a (HCC)    Generalized muscle weakness    Hypertensive heart and chronic kidney disease with heart failure and stage 1 through stage 4 chronic kidney disease, or unspecified chronic kidney disease ([facility])    Need for assistance with personal care    Other abnormalities of gait and mobility    Sudden idiopathic hearing loss, right ear    Acute and chronic respiratory failure with hypoxia ([facility])    Other disorders of refraction    Occlusion and stenosis of unspecified carotid artery    Chronic systolic congestive heart failure (HCC)    Left eye pain    Intermittent constipation    Acute on chronic combined systolic (congestive) and diastolic (congestive) heart failure (HCC)    Platelets decreased (HCC)    Anxiety  "   CHEYANNE (acute kidney injury) (HCC)    Essential hypertension    Aspiration into lower respiratory tract    Transaminitis    Severe protein-calorie malnutrition (HCC)         Subjective:   No acute complaints, patient denies difficulty swallowing foods as I evaluated him during breakfast.    Objective:     Vitals: Blood pressure 97/64, pulse 71, temperature 98 °F (36.7 °C), temperature source Axillary, resp. rate 18, height 5' 8\" (1.727 m), weight 70 kg (154 lb 5.2 oz), SpO2 93%.,Body mass index is 23.46 kg/m².    Weight (last 2 days)       Date/Time Weight    03/18/24 0555 70 (154.32)    03/17/24 0600 72.7 (160.27)    03/16/24 1508 73.3 (161.6)    03/16/24 0730 73.3 (161.6)    03/16/24 0600 73.8 (162.7)              Intake/Output Summary (Last 24 hours) at 3/18/2024 0843  Last data filed at 3/18/2024 0759  Gross per 24 hour   Intake 1200 ml   Output 3425 ml   Net -2225 ml     I/O last 3 completed shifts:  In: 1200 [P.O.:1200]  Out: 3850 [Urine:3850]         Physical Exam: BP 97/64 (BP Location: Left arm)   Pulse 71   Temp 98 °F (36.7 °C) (Axillary)   Resp 18   Ht 5' 8\" (1.727 m)   Wt 70 kg (154 lb 5.2 oz)   SpO2 93%   BMI 23.46 kg/m²     General Appearance:    Alert, cooperative, no distress, appears stated age   Head:    Normocephalic, without obvious abnormality, atraumatic   Eyes:    Conjunctiva/corneas clear   Ears:    Normal external ears   Nose:   Nares normal, septum midline, mucosa normal, no drainage    or sinus tenderness   Throat:   Lips, mucosa, and tongue normal; teeth and gums normal   Neck:   Supple   Back:     Symmetric, no curvature, ROM normal, no CVA tenderness   Lungs:   Reduced bilaterally but otherwise clear   Chest wall:    No tenderness or deformity   Heart:    Regular rate and rhythm, S1 and S2 normal, no murmur, rub   or gallop   Abdomen:     Soft, non-tender, bowel sounds active   Extremities:   Extremities normal, atraumatic, no cyanosis, +1 bilateral lower extremity edema up to " "the presacral region   Skin:   Skin color, texture, turgor normal, no rashes or lesions   Lymph nodes:   Cervical normal   Neurologic:   CNII-XII intact            Lab, Imaging and other studies: I have personally reviewed pertinent labs.  CBC:   Lab Results   Component Value Date    WBC 9.31 03/18/2024    HGB 13.0 03/18/2024    HCT 42.0 03/18/2024     (H) 03/18/2024     (L) 03/18/2024    RBC 4.17 03/18/2024    MCH 31.2 03/18/2024    MCHC 31.0 (L) 03/18/2024    RDW 15.7 (H) 03/18/2024    MPV 10.9 03/18/2024    NRBC 0 03/18/2024     CMP:   Lab Results   Component Value Date    K 3.3 (L) 03/18/2024    CL 99 03/18/2024    CO2 34 (H) 03/18/2024    BUN 29 (H) 03/18/2024    CREATININE 1.90 (H) 03/18/2024    CALCIUM 8.2 (L) 03/18/2024    EGFR 32 03/18/2024       .  Results from last 7 days   Lab Units 03/18/24  0433 03/17/24  0449 03/16/24  0459 03/15/24  0441 03/14/24  0451 03/13/24  1414 03/12/24  1405   POTASSIUM mmol/L 3.3* 3.8 3.1*   < > 4.1 4.7 4.7   CHLORIDE mmol/L 99 101 99   < > 101 99 99   CO2 mmol/L 34* 31 30   < > 26 28 29   BUN mg/dL 29* 32* 36*   < > 36* 35* 37*   CREATININE mg/dL 1.90* 2.05* 2.30*   < > 2.28* 2.30* 2.34*   CALCIUM mg/dL 8.2* 8.3* 8.4   < > 8.5 9.2 9.2   ALK PHOS U/L  --   --   --   --  115* 136* 123*   ALT U/L  --   --   --   --  65* 82* 94*   AST U/L  --   --   --   --  34 44* 46*    < > = values in this interval not displayed.         Phosphorus: No results found for: \"PHOS\"  Magnesium: No results found for: \"MG\"  Urinalysis: No results found for: \"COLORU\", \"CLARITYU\", \"SPECGRAV\", \"PHUR\", \"LEUKOCYTESUR\", \"NITRITE\", \"PROTEINUA\", \"GLUCOSEU\", \"KETONESU\", \"BILIRUBINUR\", \"BLOODU\"  Ionized Calcium: No results found for: \"CAION\"  Coagulation: No results found for: \"PT\", \"INR\", \"APTT\"  Troponin: No results found for: \"TROPONINI\"  ABG: No results found for: \"PHART\", \"HPB2GXC\", \"PO2ART\", \"DIY3GTJ\", \"Y2AFBZQL\", \"BEART\", \"SOURCE\"  Radiology review:     IMAGING  No results " found.    Current Facility-Administered Medications   Medication Dose Route Frequency    ALPRAZolam (XANAX) tablet 0.25 mg  0.25 mg Oral HS PRN    aspirin chewable tablet 81 mg  81 mg Oral HS    budesonide (PULMICORT) inhalation solution 0.5 mg  0.5 mg Nebulization BID    cholecalciferol (VITAMIN D3) tablet 1,000 Units  1,000 Units Oral Daily    dextromethorphan-guaiFENesin (ROBITUSSIN DM) oral syrup 5 mL  5 mL Oral TID PRN    famotidine (PEPCID) tablet 10 mg  10 mg Oral HS    fluticasone (FLONASE) 50 mcg/act nasal spray 1 spray  1 spray Nasal BID    formoterol (PERFOROMIST) nebulizer solution 20 mcg  20 mcg Nebulization BID    heparin (porcine) subcutaneous injection 5,000 Units  5,000 Units Subcutaneous Q8H SVETLANA    HYDROcodone-acetaminophen (NORCO) 5-325 mg per tablet 1 tablet  1 tablet Oral Q6H PRN    ipratropium (ATROVENT) 0.02 % inhalation solution 0.5 mg  0.5 mg Nebulization TID    levalbuterol (XOPENEX) inhalation solution 1.25 mg  1.25 mg Nebulization TID    lidocaine (LIDODERM) 5 % patch 2 patch  2 patch Topical Daily    methocarbamol (ROBAXIN) tablet 500 mg  500 mg Oral TID PRN    metoprolol succinate (TOPROL-XL) 24 hr tablet 12.5 mg  12.5 mg Oral Daily    midodrine (PROAMATINE) tablet 5 mg  5 mg Oral TID AC    pantoprazole (PROTONIX) EC tablet 20 mg  20 mg Oral Early Morning    saliva substitute (MOUTH KOTE) mucosal solution 1 spray  1 spray Mouth/Throat Q4H PRN    sertraline (ZOLOFT) tablet 50 mg  50 mg Oral Daily    sodium chloride (OCEAN) 0.65 % nasal spray 1 spray  1 spray Each Nare Q1H PRN     Medications Discontinued During This Encounter   Medication Reason    oxygen gas     ipratropium-albuterol (DUO-NEB) 0.5-2.5 mg/3 mL inhalation solution 3 mL     sodium chloride 0.9 % infusion     lidocaine (LIDODERM) 5 % patch 1 patch     furosemide (LASIX) injection 40 mg     furosemide (LASIX) injection 40 mg     furosemide (LASIX) injection 40 mg     midodrine (PROAMATINE) tablet 2.5 mg     midodrine  (PROAMATINE) tablet 2.5 mg     furosemide (LASIX) injection 40 mg     furosemide (LASIX) injection 40 mg     furosemide (LASIX) injection 80 mg        Jason Ortiz,       This progress note was produced in part using a dictation device which may document imprecise wording from author's original intent.

## 2024-03-19 NOTE — ASSESSMENT & PLAN NOTE
CT of abdomen and pelvis without biliary tree dilatation. Possible related congestive hepatopathy. Improving  Resume statin on discharge

## 2024-03-19 NOTE — ASSESSMENT & PLAN NOTE
BP initially soft, now improved  He was started on midodrine as an outpatient  Continue Toprol 12.5 mg daily with holding parameters  Monitor blood pressure as an outpatient

## 2024-03-19 NOTE — ASSESSMENT & PLAN NOTE
CT reveals overlying atelectasis with evidence of aspiration of hyperdense material  Speech evaluation appreciated: Modified diet-mechanical soft  Aspiration precautions

## 2024-03-19 NOTE — ASSESSMENT & PLAN NOTE
Wt Readings from Last 3 Encounters:   03/18/24 70 kg (154 lb 5.2 oz)   03/11/24 73.4 kg (161 lb 12.8 oz)   03/04/24 68 kg (150 lb)     Acute on chronic CHF  Decompensated but with lower extremity edema, fluid overload, pleural effusion    CT AP 3/13/2024: With persistent moderate right pleural effusion.  Echocardiogram 8/18/23: LVEF of 35%.  BNP >4700  IR thoracentesis 3/14/2024 with 600 ml of pleural fluid removed  Continue metoprolol succinate 12.5 mg p.o. daily  Nephrology input appreciated  Resume home Lasix

## 2024-03-19 NOTE — ASSESSMENT & PLAN NOTE
Malnutrition Findings:   Adult Malnutrition type: Acute illness  Adult Degree of Malnutrition: Other severe protein calorie malnutrition  Malnutrition Characteristics: Muscle loss, Inadequate energy, Weight loss                  360 Statement: Pt exhibits wevere malnutrition as evidenced by 12% wt loss x 6 months due to inadequate kcal intakes per pt to be treated with dysphagia diet and supplements.    BMI Findings:           Body mass index is 23.46 kg/m².

## 2024-03-19 NOTE — PROGRESS NOTES
Chief Complaint   Patient presents with   • Follow-up   • Hypertension   • Hyperlipidemia   • Elevated Blood Sugar With No Symptoms     HISTORY OF PRESENT ILLNESS:  Lisa Yip 89 year old female is here for following problems.    1.  Hypertension-blood pressure well-controlled.  Denies any headache dizziness chest pain or shortness of breath     2.  Impaired fasting glucose-A1c at 6.8.  She likes a lot of cookies every day and eats mostly frozen.  She does not like to cook for herself and does not want Meals on Wheels    3.  Hypercholesterolemia-lipid profile excellent.  Denies any body aches or myalgias except for some generalized joint aches and pains    4.  Generalized osteoarthritis and chronic back pain-uses a cane and takes aspirin for pain    She is here with her neighbor who is the primary caregiver.  Patient lives by herself        Past Medical History:   Diagnosis Date   • Benign essential HTN 2/2/2017   • Chronic back pain 9/13/2017   • Coronary artery disease involving native coronary artery of native heart without angina pectoris 2/2/2017   • Depression 1/25/2018   • Hypercholesterolemia 1/25/2018   • IFG (impaired fasting glucose) 2/2/2017   • Primary insomnia 2/2/2017   • S/P CABG x 5 2/2/2017   • S/P placement of cardiac pacemaker 2/2/2017   • Status post bilateral cataract extraction 2/2/2017   • Status post total bilateral knee replacement 2/2/2017   • Status post total replacement of both hips 2/2/2017   • Use of cane as ambulatory aid 2/2/2017       Current Outpatient Prescriptions   Medication Sig Dispense Refill   • pravastatin (PRAVACHOL) 40 MG tablet TAKE ONE TABLET BY MOUTH NIGHTLY 90 tablet 0   • metoPROLOL succinate (TOPROL-XL) 100 MG 24 hr tablet TAKE ONE TABLET BY MOUTH ONCE DAILY IN THE EVENING 90 tablet 0   • hydrochlorothiazide (HYDRODIURIL) 12.5 MG tablet TAKE ONE TABLET BY MOUTH ONCE DAILY 90 tablet 1   • amLODIPine (NORVASC) 5 MG tablet Take 1 tablet by mouth daily. 90 tablet 1  Formerly [facility]  Progress Note  Name: Seun Alva I  MRN: [id number]  Unit/Bed#: -01 I Date of Admission: 3/13/2024   Date of Service: 3/19/2024 I Hospital Day: 4    Assessment/Plan     * CHEYANNE (acute kidney injury) ([facility])  Assessment & Plan  Suspect pre-renal azotemia however patient appears to be in volume overloaded state. Patient reports using furosemide 40 mg twice daily for the past month due to increasing lower extremity edema  Initially received IV hydration  CT of abdomen and pelvis: No hydronephrosis   Nephrology recommendations appreciated: S/p furosemide 40 mg IV twice daily. Transitioned back to home furosemide  Avoid further nephrotoxins and hypotension    Congestive heart failure with left ventricular systolic dysfunction (LVSD) ([facility])  Assessment & Plan  Wt Readings from Last 3 Encounters:   03/18/24 70 kg (154 lb 5.2 oz)   03/11/24 73.4 kg (161 lb 12.8 oz)   03/04/24 68 kg (150 lb)     Acute on chronic CHF  Decompensated but with lower extremity edema, fluid overload, pleural effusion    CT AP 3/13/2024: With persistent moderate right pleural effusion.  Echocardiogram 8/18/23: LVEF of 35%.  BNP >4700  IR thoracentesis 3/14/2024 with 600 ml of pleural fluid removed  Continue metoprolol succinate 12.5 mg p.o. daily  Nephrology input appreciated  Resume home Lasix        Aspiration into lower respiratory tract  Assessment & Plan  CT reveals overlying atelectasis with evidence of aspiration of hyperdense material  Speech evaluation appreciated: Modified diet-mechanical soft  Aspiration precautions     Severe protein-calorie malnutrition (HCC)  Assessment & Plan  Malnutrition Findings:   Adult Malnutrition type: Acute illness  Adult Degree of Malnutrition: Other severe protein calorie malnutrition  Malnutrition Characteristics: Muscle loss, Inadequate energy, Weight loss                  360 Statement: Pt exhibits wevere malnutrition as evidenced by 12% wt loss x 6 months due    • blood glucose test strip Use to test blood glucose 2 times daily or as directed. (Patient taking differently: One touch :Use to test blood glucose 2 times daily or as directed.) 300 strip 1   • aspirin 325 MG tablet Take 325 mg by mouth daily.     • traMADOL (ULTRAM) 50 MG tablet Take 50 mg by mouth nightly.     • dorzolamide-timolol (COSOPT) 22.3-6.8 MG/ML ophthalmic solution 1 drop 2 times daily.     • docusate sodium (COLACE) 100 MG capsule Take 100 mg by mouth nightly.     • Multiple Vitamins-Minerals (COMPLETE MULTIVITAMIN/MINERAL PO) Take 1 tablet by mouth.     • traZODone (DESYREL) 50 MG tablet Take 1 tablet by mouth nightly. 30 tablet 3   • cholecalciferol (VITAMIN D3) 1000 UNITS tablet Take 1,000 Units by mouth daily.       No current facility-administered medications for this visit.        ALLERGIES:  No Known Allergies    REVIEW OF SYSTEMS:  No fever, cough, chest pain  Rest ROS (review of systems) as per HPI (history of present illness)      PHYSICAL EXAMINATION:  VITAL SIGNS:    Visit Vitals  /60   Pulse 59   Ht 5' 1\" (1.549 m)   Wt 59.4 kg   SpO2 98%   BMI 24.75 kg/m²     CONSTITUTIONAL: Patient appears healthy and appropriate for age, and is in no distress.  EYES:  Normal conjunctivae and lids.  PERRLA (pupils equal, round, reactive to light and accomodation.   RESPIRATORY:  No retraction or accessory muscle use.  Normal breath  sounds; no rales, rhonchi or wheezing.  CARDIOVASCULAR:  Normal S1, S2; no murmurs.  No peripheral edema.  MUSCULOSKELETAL: No cyanosis, no clubbing.  No  inflammation.  PSYCHIATRIC:  Normal judgment and insight.  Alert and oriented x3.    ASSESSMENT:  (I10) Benign essential HTN  (primary encounter diagnosis)  Comment: Well-controlled  Plan: CPM    (R73.01) IFG (impaired fasting glucose)  Comment: A1c mildly elevated  Plan: Patient counseled on ADA diet    (E78.00) Hypercholesterolemia  Comment: At goal  Plan: CPM    (M54.5,  G89.29) Chronic low back pain,  "to inadequate kcal intakes per pt to be treated with dysphagia diet and supplements.    BMI Findings:           Body mass index is 23.46 kg/m².       Transaminitis  Assessment & Plan  CT of abdomen and pelvis without biliary tree dilatation. Possible related congestive hepatopathy. Improving  Resume statin on discharge    Essential hypertension  Assessment & Plan  BP initially soft, now improved  He was started on midodrine as an outpatient  Continue Toprol 12.5 mg daily with holding parameters  Monitor blood pressure as an outpatient    Chronic respiratory failure with hypoxia (HCC)  Assessment & Plan  History of COPD  Chronically wears 2-3L NC       LAMBERTO on CPAP  Assessment & Plan  CPAP at bedtime            Medical Problems       Resolved Problems  Date Reviewed: 3/19/2024   None       Discharging Physician / Practitioner: INOCENTE Shoemaker  PCP: Sarahy Clifford DO  Admission Date:   Admission Orders (From admission, onward)       Ordered        03/14/24 1230  Inpatient Admission  Once            03/13/24 1544  Place in Observation  Once                          Discharge Date: 03/18/24    Consultations During Hospital Stay:  ***    Procedures Performed:   ***    Significant Findings / Test Results:   ***    Complications:  ***    Reason for Admission: ***    Hospital Course:   Seun Alva is a 78 y.o. male patient who originally presented to the hospital on 3/13/2024 due to ***    Condition at Discharge: {Condition:10038}    Discharge Day Visit / Exam:   Subjective:  ***  Vitals: Blood Pressure: 111/74 (03/18/24 1442)  Pulse: 101 (03/18/24 1442)  Temperature: (!) 97.4 °F (36.3 °C) (03/18/24 1442)  Temp Source: Axillary (03/18/24 1442)  Respirations: 18 (03/18/24 1442)  Height: 5' 8\" (172.7 cm) (03/13/24 1631)  Weight - Scale: 70 kg (154 lb 5.2 oz) (03/18/24 0555)  SpO2: 98 % (03/18/24 1442)  Exam:   Physical Exam     Discussion with Family: {Family Communication:44728}    Discharge " instructions/Information to patient and family:   See after visit summary for information provided to patient and family.      Provisions for Follow-Up Care:  See after visit summary for information related to follow-up care and any pertinent home health orders.      Mobility at time of Discharge:   Basic Mobility Inpatient Raw Score: 19  -HL Goal: 6: Walk 10 steps or more  -HLM Achieved: 4: Move to chair/commode  {Mobility:43392}     Disposition:   {Disposition on Discharge:02179}    Planned Readmission: ***     Discharge Statement:  I spent *** minutes discharging the patient. This time was spent on the day of discharge. I had direct contact with the patient on the day of discharge. Greater than 50% of the total time was spent examining patient, answering all patient questions, arranging and discussing plan of care with patient as well as directly providing post-discharge instructions.  Additional time then spent on discharge activities.    Discharge Medications:  See after visit summary for reconciled discharge medications provided to patient and/or family.      **Please Note: This note may have been constructed using a voice recognition system**         unspecified back pain laterality, with sciatica presence unspecified  Comment: Generalized osteoarthritis  Plan: Okay to take Tylenol 650 every 6-8 hours when necessary    (R26.2) Use of cane as ambulatory aid  Comment: Discussed about Meals on Wheels and assistance with food  Plan: She does not want make any major changes     (Z23) Need for 23-polyvalent pneumococcal polysaccharide vaccine  Comment: Given today  Plan: PNEUMOCOCCAL POLYSACCHARIDE ADULT VACC, IM/SQ                Return in about 6 months (around 12/25/2018).

## 2024-03-19 NOTE — UTILIZATION REVIEW
NOTIFICATION OF ADMISSION DISCHARGE   This is a Notification of Discharge from [facility]. Please be advised that this patient has been discharge from our facility. Below you will find the admission and discharge date and time including the patient’s disposition.   UTILIZATION REVIEW CONTACT:  Bhavna Olmstead  Utilization   Network Utilization Review Department  Phone: [phone] carefully listen to the prompts. All voicemails are confidential.  Email: [email or web address]     ADMISSION INFORMATION  PRESENTATION DATE: 3/13/2024  1:42 PM  OBERVATION ADMISSION DATE:   INPATIENT ADMISSION DATE: 3/14/24 12:30 PM   DISCHARGE DATE: 3/18/2024  5:33 PM   DISPOSITION:Home with Home Health Care    Network Utilization Review Department  ATTENTION: Please call with any questions or concerns to [phone] and carefully listen to the prompts so that you are directed to the right person. All voicemails are confidential.   For Discharge needs, contact Care Management DC Support Team at [phone] opt. 2  Send all requests for admission clinical reviews, approved or denied determinations and any other requests to dedicated fax number below belonging to the campus where the patient is receiving treatment. List of dedicated fax numbers for the Facilities:  FACILITY NAME UR FAX NUMBER   ADMISSION DENIALS (Administrative/Medical Necessity) [phone]   DISCHARGE SUPPORT TEAM ([facility]) [phone]   PARENT CHILD HEALTH (Maternity/NICU/Pediatrics) [phone]   [facility] [phone]   [facility] [phone]   [facility] [phone]   [facility] [phone]   [facility] [phone]   [facility] [phone]   [facility] [phone]   [facility]  [phone]   [facility] [phone]   [facility] [phone]   [facility] [phone]   [facility] [phone]

## 2024-03-19 NOTE — ASSESSMENT & PLAN NOTE
Suspect pre-renal azotemia however patient appears to be in volume overloaded state. Patient reports using furosemide 40 mg twice daily for the past month due to increasing lower extremity edema  Initially received IV hydration  CT of abdomen and pelvis: No hydronephrosis   Nephrology recommendations appreciated: S/p furosemide 40 mg IV twice daily. Transitioned back to home furosemide  Avoid further nephrotoxins and hypotension

## 2024-03-20 PROBLEM — N18.32 STAGE 3B CHRONIC KIDNEY DISEASE (HCC): Status: ACTIVE | Noted: 2024-01-01

## 2024-03-20 NOTE — PROGRESS NOTES
" Seun Alva 1945 male MRN: [id number]    [facility] Transition of Care Visit      SUBJECTIVE    CC: NAILA Visit     Transitional Care Management Review:  Seun Alva is a 78 y.o. male here for TCM follow up. Admitted 3/13-3/18 for CHEYANNE. Hospital course as per notes below:    \"CHEYANNE (POA)  -Creatinine on admission 2.3 mg/dL, most recent creatinine 2.43 mg/dL  -Etiology: prerenal CHEYANNE vs ATN from fluid and blood pressure shifts.   He received volume resuscitation with normal saline on 3/13 and 3/14 which has been discontinued with concern for CHF. Patient also with significant volume overload as evidenced by pleural effusion, shortness of breath, and lower extremity pitting edema. She was started back on IV lasix 40 mg BID. Patient was also noted to be consistently hypotensive with MAP above 65% and thus midodrine was increased to 5 mg TID. His urine output is nonoliguric. His creatinine is noted to be uptrending however he examines severely volume overloaded and we should continue with diuretics at this time. May need to consider pursuing more aggressive diuresis in the future if patient is not reaching euvolemia.      CKD 3  -Baseline creatinine more recently 1.3-1.6  -follow up with nephrology as outpatient   Patient was noted to have elevated UPCR, he is on jardiance 10 mg as an outpatient      HFrEF  Patient was found to have pleural effusion on CT imaging and thoracentesis was performed on 3/14 via interventional radiology. He reports his shortness of breath has improved. His weight is stable since admission at 162 lb. Continue with current diuretic regimen. Advised patient to adhere to a low salt diet     Hypertension  He is currently on metoprolol 12.5 mg daily and lasix 40 mg BID. Patient has been noted to have issues with low blood pressure in the outpatient setting and follows with his outpatient cardiologist for blood pressure management given his heart failure\"    \"1.  Acute kidney injury on " "top of chronic kidney disease              Creatinine slowly improving, now down to 1.9 mg/dL from 2.3 mg/dL 2 days ago.  Continue to optimize care and avoid potential for toxins.  Patient has been responding well with diuresis, would continue diuresis at this time.  Recommend reinitiation of Jardiance given overall clinical course of care at this time.  Patient currently not on IV diuretics, will reinitiate furosemide 40 mg daily along with the Jardiance 10 mg daily.  2.  Chronic kidney disease stage IIIb with baseline creatinine between 1.3-1.6 mg/dL  3.  Chronic systolic congestive heart failure              As noted above, patient has improved, will transition from furosemide 80 mg IV daily to Jardiance 10 mg daily and furosemide 40 mg by mouth daily.  Consider more aggressive diuretic regimen depending on progresses from a clinical standpoint.  Continue low-sodium diet.  4.  Pleural effusion              Status post thoracentesis during this admission, continue to monitor the patient clinically.  5.  Hypotension              Patient currently on midodrine, continue for now and consider adjustment in dose depending progresses well clinical standpoint.\"    During the TCM phone call patient stated:    TCM Call       Date and time call was made  3/19/2024  9:49 AM    Hospital care reviewed  Records reviewed    Patient was hospitialized at  [facility]    Date of Admission  03/13/24    Date of discharge  03/18/24    Diagnosis  CHEYANNE  acute kidney injury    Disposition  Home    Current Symptoms  None    Cough Severity  Moderate    Shortness of breath severity  Moderate    Weakness severity  Moderate; Severe          TCM Call       Post hospital issues  None    Scheduled for follow up?  Yes    Did you obtain your prescribed medications  Yes    Do you need help managing your prescriptions or medications  No    Is transportation to your appointment needed  No    I have advised the patient to call PCP with any new or " worsening symptoms  Colette TREVINO    Living Arrangements  Children; Spouse or Significiant other    Support System  Children; Spouse    The type of support provided  Emotional; Physical; Other (comment)    Do you have social support  Yes, as much as I need            During today's visit:    Follow-up blood work ordered. Continue current regimen. On Lasix 40 mg daily and potassium twice daily, midodrine TID.     Euvolemic on exam today, breathing at baseline. Oxygen 90% on 3L NC. He is experiencing increased mucous and color to mucous with coughing, will provide Augmentin for COPD exacerbation.     Burning sensation with urination, urine studies ordered.    They are complying with their medication changes and discharge instructions.     They have the following questions: As above     Review of Systems   All other systems reviewed and are negative.      Historical Information     The patient history was reviewed as follows:    Past Medical History:   Diagnosis Date    Abnormal ECG 2021 OR 2022    Alcoholism (HCC) 1984    sober 38 years    Arthritis 2008    beginning    Bilateral carotid artery stenosis     Callus     Cancer ([facility])     skin    Chronic diastolic heart failure ([facility])     Chronic ischemic heart disease     Chronic kidney disease     stage 3    Colon polyp     COPD (chronic obstructive pulmonary disease) ([facility])     Coronary artery disease     hx stents, MI, PCI    COVID 11/2021    COVID 12/01/2023    CPAP (continuous positive airway pressure) dependence     Disease of thyroid gland 2017    nodules    Emphysema of lung ([facility]) 1995    started    Hearing loss     History of transfusion 1995    Hyperlipidemia     Hypertension     Intracranial aneurysm 04/25/2023    Lung nodule 2023    x rays    MI (myocardial infarction) ([facility]) 1995    Myocardial infarction ([facility]) 1995    Pneumonia     Pneumothorax 02/20/2023    collapsed lung    Prostate cancer ([facility])     RSV (respiratory syncytial virus infection) 10/2022     S/P carotid endarterectomy     Shortness of breath     O2 2 l/nc PRN    Sleep apnea     Sleep apnea, obstructive     Stented coronary artery      Past Surgical History:   Procedure Laterality Date    APPENDECTOMY      CARDIAC CATHETERIZATION  2021    left main with no significant disease, proximal LAD with 10% stenosis at the site of prior stent, left circumflex artery with minimal luminal irregularities mid RCA with 50% stenosis at site of prior stent and PL segment with distal disease supplied by collaterals from the distal circumflex with no significant CAD requiring revascularization at that time.    CATARACT EXTRACTION, BILATERAL Bilateral     COLONOSCOPY      CORONARY ANGIOPLASTY WITH STENT PLACEMENT      RCA    CORONARY ANGIOPLASTY WITH STENT PLACEMENT      RCA    CORONARY ANGIOPLASTY WITH STENT PLACEMENT      LAD    EYE SURGERY      MD BX PROSTATE STRTCTC SATURATION SAMPLING IMG GID N/A 2022    Procedure: TRANSPERINEAL MRI FUSION  BIOPSY PROSTATE;  Surgeon: Mele Patel MD;  Location: [facility];  Service: Urology    MD NEUROPLASTY &/TRANSPOS MEDIAN NRV CARPAL TUNNE Left 2022    Procedure: RELEASE CARPAL TUNNEL;  Surgeon: Matty Mcgowan MD;  Location: [facility];  Service: Orthopedics    MD NEUROPLASTY &/TRANSPOSITION ULNAR NERVE ELBOW Left 2022    Procedure: RELEASE CUBITAL TUNNEL;  Surgeon: Matty Mcgowan MD;  Location: [facility];  Service: Orthopedics    MD NEUROPLASTY &/TRANSPOSITION ULNAR NERVE WRIST Left 2022    Procedure: GUYON'S CANAL RELEASE;  Surgeon: Matty Mcgowan MD;  Location: [facility];  Service: Orthopedics    SKIN CANCER EXCISION  2012    chin-per pt, basal cell  also right ankle    TONSILLECTOMY  no     Family History   Problem Relation Age of Onset    Heart attack Mother     Dementia Mother     Heart failure Mother          2006    Heart disease Mother         heart attacks (2)    No Known Problems Father       Social History    Social History     Substance and Sexual Activity   Alcohol Use Not Currently     Social History     Substance and Sexual Activity   Drug Use Never     Social History     Tobacco Use   Smoking Status Former    Current packs/day: 0.00    Average packs/day: 2.0 packs/day for 35.5 years (71.0 ttl pk-yrs)    Types: Cigarettes    Start date: 1960    Quit date: 1995    Years since quittin.7   Smokeless Tobacco Never   Tobacco Comments    stopped smoking the day i had a heart attack       Medications:   Meds/Allergies     Current Outpatient Medications:     ALPRAZolam (XANAX) 0.25 mg tablet, Take 1 tablet (0.25 mg total) by mouth daily at bedtime as needed for anxiety, Disp: 30 tablet, Rfl: 0    amoxicillin-clavulanate (AUGMENTIN) 875-125 mg per tablet, Take 1 tablet by mouth every 12 (twelve) hours for 7 days, Disp: 14 tablet, Rfl: 0    aspirin 81 mg chewable tablet, 81 mg daily at bedtime, Disp: , Rfl:     budesonide (PULMICORT) 0.5 mg/2 mL nebulizer solution, Take 2 mL (0.5 mg total) by nebulization 2 (two) times a day Rinse mouth after use., Disp: 180 mL, Rfl: 3    cholecalciferol (VITAMIN D3) 1,000 units tablet, Take 1,000 Units by mouth daily, Disp: , Rfl:     dextromethorphan-guaiFENesin (ROBITUSSIN DM)  mg/5 mL syrup, Take 5 mL by mouth 3 (three) times a day as needed for cough, Disp: 354 mL, Rfl: 0    Empagliflozin (Jardiance) 10 MG TABS tablet, Take 1 tablet (10 mg total) by mouth every morning, Disp: 90 tablet, Rfl: 5    famotidine (PEPCID) 20 mg tablet, Take 1 tablet (20 mg total) by mouth daily at bedtime, Disp: 90 tablet, Rfl: 0    fluticasone (FLONASE) 50 mcg/act nasal spray, 1 spray into each nostril 2 (two) times a day, Disp: 9.9 mL, Rfl: 5    formoterol (PERFOROMIST) 20 MCG/2ML nebulizer solution, Take 2 mL (20 mcg total) by nebulization 2 (two) times a day, Disp: 180 mL, Rfl: 0    furosemide (LASIX) 40 mg tablet, Take 1 tablet (40 mg total) by mouth daily, Disp: , Rfl:     gabapentin,  once-daily, (GRALISE) 300 MG tablet, Take 300 mg by mouth daily at bedtime, Disp: , Rfl:     Humidifier MISC, Use continuous, Disp: 1 each, Rfl: 0    HYDROcodone-acetaminophen (Norco) 5-325 mg per tablet, Take 1 tablet by mouth every 6 (six) hours as needed for pain Max Daily Amount: 4 tablets, Disp: 15 tablet, Rfl: 0    ipratropium-albuterol (DUO-NEB) 0.5-2.5 mg/3 mL nebulizer solution, , Disp: , Rfl:     lidocaine (Lidoderm) 5 %, Apply 1 patch topically over 12 hours daily Remove & Discard patch within 12 hours or as directed by MD, Disp: 30 patch, Rfl: 11    methocarbamol (ROBAXIN) 500 mg tablet, Take 1 tablet (500 mg total) by mouth 3 (three) times a day as needed for muscle spasms, Disp: 30 tablet, Rfl: 0    metoprolol succinate (TOPROL-XL) 25 mg 24 hr tablet, Take 0.5 tablets (12.5 mg total) by mouth 2 (two) times a day (Patient taking differently: Take 12.5 mg by mouth daily), Disp: 30 tablet, Rfl: 0    midodrine (PROAMATINE) 5 mg tablet, Take 1 tablet (5 mg total) by mouth 3 (three) times a day before meals, Disp: 90 tablet, Rfl: 0    Mouthwashes (Biotene Dry Mouth) LIQD, Apply 1 each to the mouth or throat every 4 (four) hours as needed (Dry mouth), Disp: 473 mL, Rfl: 5    naloxone (NARCAN) 4 mg/0.1 mL nasal spray, Administer 1 spray into a nostril. If no response after 2-3 minutes, give another dose in the other nostril using a new spray., Disp: 1 each, Rfl: 1    omeprazole (PriLOSEC) 20 mg delayed release capsule, take 1 capsule by mouth once daily, Disp: 30 capsule, Rfl: 3    oxygen gas, Inhale 2 L/min continuous 2LPM at rest and 3-4 LPM with activity per D Cedric FANG notes, Disp: , Rfl:     potassium chloride 10% oral solution, Take 30 mL (40 mEq total) by mouth daily (Patient taking differently: Take 20 mEq by mouth daily), Disp: , Rfl:     rosuvastatin (CRESTOR) 40 MG tablet, TAKE 1 TABLET DAILY, Disp: 100 tablet, Rfl: 3    sertraline (ZOLOFT) 50 mg tablet, Take 1 tablet (50 mg total) by mouth daily,  "Disp: 30 tablet, Rfl: 5    sodium chloride (OCEAN) 0.65 % nasal spray, 1 spray into each nostril every hour as needed for congestion, Disp: 30 mL, Rfl: 11  Allergies   Allergen Reactions    Lisinopril Swelling and Cough    Tetanus Antitoxin Anaphylaxis    Tetanus Toxoid Anaphylaxis and Swelling       OBJECTIVE    Vitals:   Vitals:    03/20/24 1107 03/20/24 1122   BP: 120/52    Pulse: 99    Resp: 18    Temp: 97.9 °F (36.6 °C)    SpO2: (!) 85% 90%   Weight: 69.9 kg (154 lb)    Height: 5' 8\" (1.727 m)        Body mass index is 23.42 kg/m².    Physical Exam:    Physical Exam  Vitals reviewed.   Constitutional:       General: He is not in acute distress.     Appearance: Normal appearance. He is not ill-appearing, toxic-appearing or diaphoretic.   Cardiovascular:      Rate and Rhythm: Regular rhythm. Tachycardia present.      Heart sounds: Normal heart sounds. No murmur heard.     No friction rub. No gallop.      Comments: Distant heart sounds   Pulmonary:      Effort: Pulmonary effort is normal. No respiratory distress.      Breath sounds: No stridor. No wheezing.      Comments: Trace bibasilar crackles   Musculoskeletal:      Right lower leg: Edema present.      Left lower leg: Edema present.      Comments: Trace pitting edema bilaterally    Skin:     General: Skin is warm.      Findings: No erythema or rash.   Neurological:      Mental Status: He is alert and oriented to person, place, and time.      Motor: No weakness.   Psychiatric:         Mood and Affect: Mood normal.         Behavior: Behavior normal.          Labs:  I have personally reviewed all pertinent results.     No results displayed because visit has over 200 results.          Imaging:  I have personally reviewed all pertinent results.    Assessment/Plan    No problem-specific Assessment & Plan notes found for this encounter.    Shlomo was seen today for transition of care management.    Diagnoses and all orders for this visit:    CHEYANNE (acute kidney injury) " ([facility])  -     Comprehensive metabolic panel; Future  -     CBC and differential; Future  -     Magnesium; Future    Stage 3b chronic kidney disease ([facility])  -     Comprehensive metabolic panel; Future  -     CBC and differential; Future  -     Magnesium; Future    Hypertensive heart and chronic kidney disease with heart failure and stage 1 through stage 4 chronic kidney disease, or unspecified chronic kidney disease (HCC)    Congestive heart failure with left ventricular systolic dysfunction (LVSD) ([facility])    Essential hypertension    COPD exacerbation ([facility])  -     amoxicillin-clavulanate (AUGMENTIN) 875-125 mg per tablet; Take 1 tablet by mouth every 12 (twelve) hours for 7 days    Dysuria  -     UA w Reflex to Microscopic w Reflex to Culture; Future  -     Urine culture; Future          Future Appointments   Date Time Provider Department Center   3/21/2024 To Be Determined Vera Haji RN VN [facility]   4/12/2024  1:40 PM Liz Lopez DO [facility]   4/16/2024  2:20 PM Rubio Sykes DO [facility]   4/26/2024  4:00 PM Sarahy Clifford DO [facility]   7/11/2024  1:40 PM Mignon Bear PA-C CTR UR AL Practice[facility]          Sarahy Clifford DO  [facility]

## 2024-03-24 PROBLEM — I50.23 ACUTE ON CHRONIC SYSTOLIC CONGESTIVE HEART FAILURE (HCC): Status: ACTIVE | Noted: 2023-01-01

## 2024-03-24 NOTE — ASSESSMENT & PLAN NOTE
Patient presents with significant shortness of breath and volume overload  NT-proBNP greater than 4700 consistent with acute on chronic systolic congestive heart failure exacerbation  Most recent ejection fraction 6 months ago with an EF of 35%  Complicated by right-sided pleural effusion and hepatic congestion    Lasix 40 mg IV 2 times daily  Continue home beta-blocker and SGLT-2 inhibitor  Sodium and fluid restricted diets  Monitor on telemetry  Strict intake and output  Daily standing weights  Monitor electrolytes and renal function while undergoing diuresis  Continue home scheduled potassium chloride

## 2024-03-24 NOTE — ED NOTES
Jonn from [facility] notified via [phone]     Junior Ellison, LEYDA  03/24/24 0549

## 2024-03-24 NOTE — ED NOTES
Pt released by [employer] as well as        Junior Ellison RN  03/24/24 1553

## 2024-03-24 NOTE — ED CARE HANDOFF
Emergency Department Sign Out Note        Sign out and transfer of care from . See Separate Emergency Department note.     The patient, Seun Alva, was evaluated by the previous provider for sob.    Workup Completed:  Labs imaging     ED Course / Workup Pending (followup):  78-year-old male presented the shortness of breath are chronically dependent on oxygen history of CHF COPD.  Elevated LFT which was not present 4 days ago.  Concerning for CHF/pleural effusion.  Ultrasound reviewed CT reviewed ultrasound shows a gallbladder sludge iker with general surgery Dr. Cason states that it most likely secondary to CHF but will see the patient as a consult.  This was discussed with the internal medicine team Dr. Patten who accepted the admission.                                  ED Course as of 03/24/24 1117   Sun Mar 24, 2024   0650 79yo m came with abdominal pain elevated LFT pending US/CT    0758 COPD with advanced emphysema. There is some nonobstructing material within the posterior aspect of the distal trachea extending to the darrius and into the proximal right mainstem bronchus, likely secretions versus aspirated material. Redemonstrated right   basilar atelectasis with evidence of previously aspirated hyperdense material, similar to the prior studies. There is a moderate size right pleural effusion.     The surface contour of the liver appears slightly nodular. Clinical and laboratory correlation regarding the possibility of early cirrhosis is suggested.     There is a small amount of ascites.     Body wall edema.     The prostate causes some mild nodular appearing indentation upon the base of the urinary bladder. Clinical and laboratory (PSA) correlation recommended.     Other nonemergent findings as above.     0959 Gallstones and gallbladder sludge. Mild gallbladder wall thickening may be related to ascites and adjacent liver disease.     Findings suggest cirrhosis secondary to passive hepatic  congestion from right heart disease.     Small ascites and right pleural effusion.     Small echogenic left lobe liver nodule, statistically likely a benign hemangioma or focal fatty deposition, especially if no known history of malignancy. Can consider follow-up ultrasound in 6 to 12 months.       Procedures  Medical Decision Making  Amount and/or Complexity of Data Reviewed  Labs: ordered.  Radiology: ordered.    Risk  Decision regarding hospitalization.            Disposition  Final diagnoses:   Elevated LFTs   CHF (congestive heart failure) (HCC)   Pleural effusion   Renal failure     Time reflects when diagnosis was documented in both MDM as applicable and the Disposition within this note       Time User Action Codes Description Comment    3/24/2024 10:58 AM Shaji Karel Add [R79.89] Elevated LFTs     3/24/2024 10:58 AM Ahmad Karel Add [I50.9] CHF (congestive heart failure) (HCC)     3/24/2024 10:58 AM Ahsamy Karel Add [J90] Pleural effusion     3/24/2024 11:13 AM Brooks Virk Modify [R79.89] Elevated LFTs     3/24/2024 11:13 AM Brooks Virk Add [I50.43] Acute on chronic combined systolic (congestive) and diastolic (congestive) heart failure (HCC)     3/24/2024 11:15 AM Shaji Karel Add [N19] Renal failure           ED Disposition       ED Disposition   Admit    Condition   Stable    Date/Time   Sun Mar 24, 2024 11:15 AM    Comment   Case was discussed with Dr. virk and the patient's admission status was agreed to be Admission Status:INPT status to the service of Dr. virk .               Follow-up Information    None       Patient's Medications   Discharge Prescriptions    No medications on file     No discharge procedures on file.       ED Provider  Electronically Signed by     Karel Girard MD  03/24/24 9248

## 2024-03-24 NOTE — ASSESSMENT & PLAN NOTE
Mood appears stable    Continue home sertraline 50 mg oral daily and alprazolam 0.25 mg oral daily at bedtime  Monitor patient's mood

## 2024-03-24 NOTE — ASSESSMENT & PLAN NOTE
Malnutrition Findings:                                 BMI Findings:     Present on admission as evidenced by BMI of less than 25    Nutrition consult  Encourage healthy diet        There is no height or weight on file to calculate BMI.

## 2024-03-24 NOTE — Clinical Note
Case was discussed with Dr. virk and the patient's admission status was agreed to be Admission Status:INPT status to the service of Dr. virk .

## 2024-03-24 NOTE — ASSESSMENT & PLAN NOTE
alkaline phosphatase 134  Likely secondary to hepatic congestion in the setting of volume overload  No evidence of acute liver failure    IV diuresis as above  Trend CMP  Avoid hepatotoxic agents

## 2024-03-24 NOTE — ASSESSMENT & PLAN NOTE
Utilizes 2-3 L of supplemental O2 at baseline  Currently saturating well on 3 L of nasal cannula O2    Wean O2 as tolerated  Goal SpO2 greater than 88%  Respiratory protocol

## 2024-03-24 NOTE — DEATH NOTE
INPATIENT DEATH NOTE  Seun Alva 78 y.o. male MRN: [id number]  Unit/Bed#: ED 25 Encounter: [id number]    Date, Time and Cause of Death    Date of Death: 3/24/24  Time of Death: 12:06 PM  Preliminary Cause of Death: Acute on chronic combined systolic and diastolic congestive heart failure (HCC)          Patient's Information  Pronounced by: Brooks Patten, DO  Did the patient's death occur in the ED?: Yes  Did the patient's death occur in the OR?: No  Did the patient's death occur less than 10 days post-op?: No  Did the patient's death occur within 24 hours of admission?: Yes  Was code status DNR at the time of death?: Yes    PHYSICAL EXAM:  Unresponsive to noxious stimuli, Spontaneous respirations absent, Breath sounds absent, Carotid pulse absent, Heart sounds absent, Pupillary light reflex absent, and Corneal blink reflex absent    Medical Examiner notification criteria:  Patient  within 24 hours of arrival to hospital   Medical Examiner's office notified?:  No, does not meet ME notification criteria   Medical Examiner accepted case?:  No  Name of Medical Examiner: N/A    Family Notification  Was the family notified?: Yes  Date Notified: 24  Time Notified: 1222  Notified by: Brooks Patten DO  Name of Family Notified of Death: Nedra   Relationship to Patient: Daughter  Family Notification Route: Telephone  Was the family told to contact a  home?: Yes    Autopsy Options:  Post-mortem examination declined by next of kin    Primary Service Attending Physician notified?:  yes - Attending:  Brooks Patten DO    Physician/Resident responsible for completing Discharge Summary:  Brooks Patten DO

## 2024-03-24 NOTE — ED NOTES
Patient rang call bell and requested to use bedside commode to pass urine and a bowel movement. At this time patient exhibited no worsening signs of shortness of breath and was able to sit up from bed with minimal assistance, stand, turn and sit onto commode. Call bell and table next to patient, rang several minutes later and was found standing and leaning onto bed but not responding. Patient was stood up by nurse with no assistance from patient and had to be lifted and laid onto bed. RN immediately paged for emergency help and other ER staff including Dr Girard as well as Dr Patten and ICU provider promptly arrive at bedside. Patient continued to be unresponsive and demonstrated agonal breathing. Originally unable to obtain blood pressure and had questionable vs intermittent pulses. Patient was mechanically ventilated with bag-valve mask. Pupils 3mm and non-reactive. JVD noted bilaterally and worse than this morning. Dr Patten calls family to inform them of change in condition as well as to confirm DNR/DNI status. Patient started on norepinephrine drip to support blood pressure as well as sodium bicarb given IV. Patient continues over the next several minutes to decline - heart rate declining into the 50's, then 40s. Breathing efforts becoming minimal. This trend continues until pulse remains absent, there is no patient respirations and patient is pronounced  by Dr Patten at 1206. Family notified via telephone.      Junior lElison RN  [phone]

## 2024-03-24 NOTE — ED NOTES
Notified [employer] - spoke to Case via [phone], pt will be suitable for some donations. Family contact info provided for daughterlEisha Mosley  Home chosen by family.        Junior Ellison RN  [phone]       Junior Ellison RN  [phone]

## 2024-03-24 NOTE — DISCHARGE SUMMARY
[facility]  Discharge- Seun Alva 1945, 78 y.o. male MRN: [id number]  Unit/Bed#: ED 25 Encounter: [id number]  Primary Care Provider: Sarahy Clifford DO   Date and time admitted to hospital: 3/24/2024  3:49 AM    * Acute on chronic systolic congestive heart failure (HCC)  Assessment & Plan  Patient presents with significant shortness of breath and volume overload  NT-proBNP greater than 4700 consistent with acute on chronic systolic congestive heart failure exacerbation  Most recent ejection fraction 6 months ago with an EF of 35%  Complicated by right-sided pleural effusion and hepatic congestion    Lasix 40 mg IV 2 times daily  Continue home beta-blocker and SGLT-2 inhibitor  Sodium and fluid restricted diets  Monitor on telemetry  Strict intake and output  Daily standing weights  Monitor electrolytes and renal function while undergoing diuresis  Continue home scheduled potassium chloride    Transaminitis  Assessment & Plan    alkaline phosphatase 134  Likely secondary to hepatic congestion in the setting of volume overload  No evidence of acute liver failure    IV diuresis as above  Trend CMP  Avoid hepatotoxic agents    COPD (chronic obstructive pulmonary disease) (HCC)  Assessment & Plan  Does not appear to be in acute exacerbation and symptoms of shortness of breath more likely related to volume overload    Continue home inhalers  Respiratory protocol    Stage 3b chronic kidney disease (HCC)  Assessment & Plan  Lab Results   Component Value Date    EGFR 33 03/24/2024    EGFR 41 03/20/2024    EGFR 32 03/18/2024    CREATININE 1.85 (H) 03/24/2024    CREATININE 1.55 (H) 03/20/2024    CREATININE 1.90 (H) 03/18/2024     Creatinine appears to be at baseline however may be slightly higher but does not meet criteria for acute kidney injury  Likely secondary to cardiorenal syndrome    IV diuresis as above  Trend BMP  Avoid nephrotoxic agents and hypotension    Oxygen  dependent  Assessment & Plan  Utilizes 2-3 L of supplemental O2 at baseline  Currently saturating well on 3 L of nasal cannula O2    Wean O2 as tolerated  Goal SpO2 greater than 88%  Respiratory protocol    Severe protein-calorie malnutrition (HCC)  Assessment & Plan  Malnutrition Findings:                                 BMI Findings:     Present on admission as evidenced by BMI of less than 25    Nutrition consult  Encourage healthy diet        There is no height or weight on file to calculate BMI.       LAMBERTO on CPAP  Assessment & Plan  History of LAMBERTO on CPAP    CPAP nightly  Respiratory protocol    GERD (gastroesophageal reflux disease)  Assessment & Plan  Chronic and asymptomatic    Continue home H2 blocker and PPI    Depression, recurrent (HCC)  Assessment & Plan  Mood appears stable    Continue home sertraline 50 mg oral daily and alprazolam 0.25 mg oral daily at bedtime  Monitor patient's mood    Hyperlipidemia  Assessment & Plan  Stable and without anginal symptoms    Continue home statin therapy              Medical Problems       Resolved Problems  Date Reviewed: 3/24/2024   None         Admission Date:   Admission Orders (From admission, onward)       Ordered        03/24/24 1113  Inpatient Admission  Once                            Admitting Diagnosis: Shortness of breath [R06.02]    HPI: Seun Alva is a 78 y.o. male with a past medical history significant for HFrEF with ejection fraction of 35%, COPD, stage IIIb CKD, hyperlipidemia, GERD, LAMBERTO on CPAP who presents with complaints of shortness of breath.  The patient utilizes oxygen chronically 2-3 L of nasal cannula supplemental O2 at baseline.  The patient states that he was with his family who assist in providing the history.  They state that he has been having symptoms of dyspnea that have been worsening over the course of the past few days.  The patient denies lower extremity edema or weight gain.  He does state compliance with home diuretic  therapy.  He also denies chest pain, abdominal pain, dizziness.  In the ED, the patient was found to be saturating well on his baseline O2 requirement.  Laboratory analysis remarkable for a markedly elevated NT-proBNP and transaminitis consistent with congestive heart failure exacerbation and hepatic congestion respectively.  The patient was admitted to the general medicine service for further evaluation and treatment of acute on chronic systolic congestive heart failure exacerbation for IV diuretic therapy.     Procedures Performed: No orders of the defined types were placed in this encounter.      Summary of Hospital Course: Very shortly after admission to the hospital, while the patient was still in the ED, the patient became acutely altered.  I was notified by nursing staff and returned to the bedside as soon as possible with my critical care colleague.  The patient was exhibiting agonal breathing at the time of evaluation.  The patient's heart rate began to decrease and the patient was given 2 amps of bicarb.  The patient appeared mottled and very sick and appeared as if he was going to decompensate quickly and possibly pass away.  While the critical care team provided support and treatment to the patient, I called the patient's daughter Nedra to notify her of her fathers condition.  Shortly after the phone call, the critical care and emergency room staff could not obtain a pulse and the patient did not respond to noxious stimuli and his pupils were fixed.  At the time of the patient's death the patient was DNR/DNI.  I confirmed this with the patient's daughter Nedra via telephone prior to his death.  I believe the patient went into flash pulmonary edema which led to his demise.  May the patient rest in peace.  The family was notified of the patient's passing.    Significant Findings, Care, Treatment and Services Provided: Not applicable    Complications: Patient     Condition at Discharge:      Date, Time and Cause of Death    Date of Death: 3/24/24  Time of Death: 12:06 PM  Preliminary Cause of Death: Acute on chronic combined systolic and diastolic congestive heart failure (HCC)         Discharge instructions/Information to patient and family:   See after visit summary for information provided to patient and family.      Provisions for Follow-Up Care:  See after visit summary for information related to follow-up care and any pertinent home health orders.      PCP: Sarahy Clifford DO    Disposition:  [facility]    Planned Readmission: No    Discharge Statement   I spent 45 minutes discharging the patient. This time was spent on the day of discharge. I had direct contact with the patient on the day of discharge. Additional documentation is required if more than 30 minutes were spent on discharge.     Discharge Medications:  See after visit summary for reconciled discharge medications provided to patient and family.

## 2024-03-24 NOTE — ASSESSMENT & PLAN NOTE
Lab Results   Component Value Date    EGFR 33 03/24/2024    EGFR 41 03/20/2024    EGFR 32 03/18/2024    CREATININE 1.85 (H) 03/24/2024    CREATININE 1.55 (H) 03/20/2024    CREATININE 1.90 (H) 03/18/2024     Creatinine appears to be at baseline however may be slightly higher but does not meet criteria for acute kidney injury  Likely secondary to cardiorenal syndrome    IV diuresis as above  Trend BMP  Avoid nephrotoxic agents and hypotension

## 2024-03-24 NOTE — H&P
Critical access hospital  H&P  Name: Seun Alva 78 y.o. male I MRN: [id number]  Unit/Bed#: ED 25 I Date of Admission: 3/24/2024   Date of Service: 3/24/2024 I Hospital Day: 0      Assessment/Plan   * Acute on chronic systolic congestive heart failure (HCC)  Assessment & Plan  Patient presents with significant shortness of breath and volume overload  NT-proBNP greater than 4700 consistent with acute on chronic systolic congestive heart failure exacerbation  Most recent ejection fraction 6 months ago with an EF of 35%  Complicated by right-sided pleural effusion and hepatic congestion    Lasix 40 mg IV 2 times daily  Continue home beta-blocker and SGLT-2 inhibitor  Sodium and fluid restricted diets  Monitor on telemetry  Strict intake and output  Daily standing weights  Monitor electrolytes and renal function while undergoing diuresis  Continue home scheduled potassium chloride    Transaminitis  Assessment & Plan    alkaline phosphatase 134  Likely secondary to hepatic congestion in the setting of volume overload  No evidence of acute liver failure    IV diuresis as above  Trend CMP  Avoid hepatotoxic agents    COPD (chronic obstructive pulmonary disease) (HCC)  Assessment & Plan  Does not appear to be in acute exacerbation and symptoms of shortness of breath more likely related to volume overload    Continue home inhalers  Respiratory protocol    Stage 3b chronic kidney disease (HCC)  Assessment & Plan  Lab Results   Component Value Date    EGFR 33 03/24/2024    EGFR 41 03/20/2024    EGFR 32 03/18/2024    CREATININE 1.85 (H) 03/24/2024    CREATININE 1.55 (H) 03/20/2024    CREATININE 1.90 (H) 03/18/2024     Creatinine appears to be at baseline however may be slightly higher but does not meet criteria for acute kidney injury  Likely secondary to cardiorenal syndrome    IV diuresis as above  Trend BMP  Avoid nephrotoxic agents and hypotension    Oxygen dependent  Assessment &  Plan  Utilizes 2-3 L of supplemental O2 at baseline  Currently saturating well on 3 L of nasal cannula O2    Wean O2 as tolerated  Goal SpO2 greater than 88%  Respiratory protocol    Severe protein-calorie malnutrition (HCC)  Assessment & Plan  Malnutrition Findings:                                 BMI Findings:     Present on admission as evidenced by BMI of less than 25    Nutrition consult  Encourage healthy diet        There is no height or weight on file to calculate BMI.       LAMBERTO on CPAP  Assessment & Plan  History of LAMBERTO on CPAP    CPAP nightly  Respiratory protocol    GERD (gastroesophageal reflux disease)  Assessment & Plan  Chronic and asymptomatic    Continue home H2 blocker and PPI    Depression, recurrent (HCC)  Assessment & Plan  Mood appears stable    Continue home sertraline 50 mg oral daily and alprazolam 0.25 mg oral daily at bedtime  Monitor patient's mood    Hyperlipidemia  Assessment & Plan  Stable and without anginal symptoms    Continue home statin therapy         VTE Prophylaxis: Heparin  Code Status: Level 3 DNR/DNI    Anticipated Length of Stay:  Patient will be admitted on an Inpatient basis with an anticipated length of stay of greater than 2 midnights.   Justification for Hospital Stay: Acute on chronic systolic congestive heart failure exacerbation    Total Time for Visit, including Counseling / Coordination of Care: I have spent a total time of 45 minutes on 03/24/24 in caring for this patient including Diagnostic results, Prognosis, Risks and benefits of tx options, Instructions for management, Patient and family education, Importance of tx compliance, Risk factor reductions, Impressions, Counseling / Coordination of care, Documenting in the medical record, Reviewing / ordering tests, medicine, procedures  , Obtaining or reviewing history  , and Communicating with other healthcare professionals .    Chief Complaint: Shortness of breath    History of Present Illness:    Seun ROLLINS  Artie is a 78 y.o. male with a past medical history significant for HFrEF with ejection fraction of 35%, COPD, stage IIIb CKD, hyperlipidemia, GERD, LAMBERTO on CPAP who presents with complaints of shortness of breath.  The patient utilizes oxygen chronically 2-3 L of nasal cannula supplemental O2 at baseline.  The patient states that he was with his family who assist in providing the history.  They state that he has been having symptoms of dyspnea that have been worsening over the course of the past few days.  The patient denies lower extremity edema or weight gain.  He does state compliance with home diuretic therapy.  He also denies chest pain, abdominal pain, dizziness.  In the ED, the patient was found to be saturating well on his baseline O2 requirement.  Laboratory analysis remarkable for a markedly elevated NT-proBNP and transaminitis consistent with congestive heart failure exacerbation and hepatic congestion respectively.  The patient was admitted to the general medicine service for further evaluation and treatment of acute on chronic systolic congestive heart failure exacerbation for IV diuretic therapy.    Review of Systems:    Review of Systems   Constitutional:  Negative for chills and fever.   HENT:  Negative for ear pain and sore throat.    Eyes:  Negative for pain and visual disturbance.   Respiratory:  Positive for shortness of breath. Negative for cough.    Cardiovascular:  Negative for chest pain and palpitations.   Gastrointestinal:  Negative for abdominal pain and vomiting.   Genitourinary:  Negative for dysuria and hematuria.   Musculoskeletal:  Negative for arthralgias and back pain.   Skin:  Negative for color change and rash.   Neurological:  Negative for seizures and syncope.   All other systems reviewed and are negative.      Past Medical and Surgical History:     Past Medical History:   Diagnosis Date    Abnormal ECG 2021 OR 2022    Alcoholism (HCC) 1984    sober 38 years    Arthritis 2008     beginning    Bilateral carotid artery stenosis     Callus     Cancer (HCC)     skin    Chronic diastolic heart failure (HCC)     Chronic ischemic heart disease     Chronic kidney disease     stage 3    Colon polyp     COPD (chronic obstructive pulmonary disease) (HCC)     Coronary artery disease     hx stents, MI, PCI    COVID 11/2021    COVID 12/01/2023    CPAP (continuous positive airway pressure) dependence     Disease of thyroid gland 2017    nodules    Emphysema of lung (HCC) 1995    started    Hearing loss     History of transfusion 1995    Hyperlipidemia     Hypertension     Intracranial aneurysm 04/25/2023    Lung nodule 2023    x rays    MI (myocardial infarction) (HCC) 1995    Myocardial infarction (HCC) 1995    Pneumonia     Pneumothorax 02/20/2023    collapsed lung    Prostate cancer (HCC)     RSV (respiratory syncytial virus infection) 10/2022    S/P carotid endarterectomy     Shortness of breath     O2 2 l/nc PRN    Sleep apnea     Sleep apnea, obstructive     Stented coronary artery        Past Surgical History:   Procedure Laterality Date    APPENDECTOMY      CARDIAC CATHETERIZATION  03/18/2021    left main with no significant disease, proximal LAD with 10% stenosis at the site of prior stent, left circumflex artery with minimal luminal irregularities mid RCA with 50% stenosis at site of prior stent and PL segment with distal disease supplied by collaterals from the distal circumflex with no significant CAD requiring revascularization at that time.    CATARACT EXTRACTION, BILATERAL Bilateral     COLONOSCOPY      CORONARY ANGIOPLASTY WITH STENT PLACEMENT  1999    RCA    CORONARY ANGIOPLASTY WITH STENT PLACEMENT  2001    RCA    CORONARY ANGIOPLASTY WITH STENT PLACEMENT  2003    LAD    EYE SURGERY      AL BX PROSTATE STRTCTC SATURATION SAMPLING IMG GID N/A 09/13/2022    Procedure: TRANSPERINEAL MRI FUSION  BIOPSY PROSTATE;  Surgeon: Mele Patel MD;  Location: [facility];  Service: Urology    AL  NEUROPLASTY &/TRANSPOS MEDIAN NRV CARPAL TUNNE Left 07/22/2022    Procedure: RELEASE CARPAL TUNNEL;  Surgeon: Matty Mcgowan MD;  Location: [facility];  Service: Orthopedics    SD NEUROPLASTY &/TRANSPOSITION ULNAR NERVE ELBOW Left 07/22/2022    Procedure: RELEASE CUBITAL TUNNEL;  Surgeon: Matty Mcgowan MD;  Location: [facility];  Service: Orthopedics    SD NEUROPLASTY &/TRANSPOSITION ULNAR NERVE WRIST Left 07/22/2022    Procedure: GUYON'S CANAL RELEASE;  Surgeon: Matty Mcgowan MD;  Location: [facility];  Service: Orthopedics    SKIN CANCER EXCISION  2012    chin-per pt, basal cell  also right ankle    TONSILLECTOMY  no       Meds/Allergies:    Prior to Admission medications    Medication Sig Start Date End Date Taking? Authorizing Provider   ALPRAZolam (XANAX) 0.25 mg tablet Take 1 tablet (0.25 mg total) by mouth daily at bedtime as needed for anxiety 2/20/24   Sarahy Clifford DO   amoxicillin-clavulanate (AUGMENTIN) 875-125 mg per tablet Take 1 tablet by mouth every 12 (twelve) hours for 7 days 3/20/24 3/27/24  Sarahy Clifford DO   aspirin 81 mg chewable tablet 81 mg daily at bedtime    Historical Provider, MD   budesonide (PULMICORT) 0.5 mg/2 mL nebulizer solution Take 2 mL (0.5 mg total) by nebulization 2 (two) times a day Rinse mouth after use. 12/22/23   Sarahy Clifford DO   cholecalciferol (VITAMIN D3) 1,000 units tablet Take 1,000 Units by mouth daily    Historical Provider, MD   dextromethorphan-guaiFENesin (ROBITUSSIN DM)  mg/5 mL syrup Take 5 mL by mouth 3 (three) times a day as needed for cough 2/13/23   Manny Steele PA-C   Empagliflozin (Jardiance) 10 MG TABS tablet Take 1 tablet (10 mg total) by mouth every morning 9/6/23 2/27/25  Bar Powell MD   famotidine (PEPCID) 20 mg tablet Take 1 tablet (20 mg total) by mouth daily at bedtime 2/13/24   Sarahy Clifford DO   fluticasone (FLONASE) 50 mcg/act nasal spray 1 spray into each nostril 2 (two) times a day 2/22/24    Sarahy Clifford DO   formoterol (PERFOROMIST) 20 MCG/2ML nebulizer solution Take 2 mL (20 mcg total) by nebulization 2 (two) times a day 11/28/23   Sarahy Clifford DO   furosemide (LASIX) 40 mg tablet Take 1 tablet (40 mg total) by mouth daily 2/24/24   Sarahy Clifford DO   gabapentin, once-daily, (GRALISE) 300 MG tablet Take 300 mg by mouth daily at bedtime    Historical Provider, MD   Humidifier MISC Use continuous 11/28/23   INOCENTE Parisi   HYDROcodone-acetaminophen (Norco) 5-325 mg per tablet Take 1 tablet by mouth every 6 (six) hours as needed for pain Max Daily Amount: 4 tablets 6/30/23   Sarahy Clifford DO   ipratropium-albuterol (DUO-NEB) 0.5-2.5 mg/3 mL nebulizer solution Take 3 mL by nebulization 4 (four) times a day 8/14/23   Historical Provider, MD   lidocaine (Lidoderm) 5 % Apply 1 patch topically over 12 hours daily Remove & Discard patch within 12 hours or as directed by MD 3/8/24   Sarahy Clifford DO   magnesium oxide (MAG-OX) 400 mg tablet Take 1 tablet (400 mg total) by mouth daily 3/21/24   Sarahy Clifford DO   methocarbamol (ROBAXIN) 500 mg tablet Take 1 tablet (500 mg total) by mouth 3 (three) times a day as needed for muscle spasms 7/23/23   Sarahy Clifford DO   metoprolol succinate (TOPROL-XL) 25 mg 24 hr tablet Take 0.5 tablets (12.5 mg total) by mouth 2 (two) times a day  Patient taking differently: Take 12.5 mg by mouth daily 10/27/23   Hyacinth Espana MD   midodrine (PROAMATINE) 5 mg tablet Take 1 tablet (5 mg total) by mouth 3 (three) times a day before meals 3/18/24 4/17/24  INOCENTE Shoemaker   Mouthwashes (Biotene Dry Mouth) LIQD Apply 1 each to the mouth or throat every 4 (four) hours as needed (Dry mouth) 3/11/24   Sarahy Darrin, DO   naloxone (NARCAN) 4 mg/0.1 mL nasal spray Administer 1 spray into a nostril. If no response after 2-3 minutes, give another dose in the other nostril using a new spray. 6/30/23 6/29/24  Sarahy Clifford DO    omeprazole (PriLOSEC) 20 mg delayed release capsule take 1 capsule by mouth once daily 23   Shakira Curry PA-C   oxygen gas Inhale 2 L/min continuous 2LPM at rest and 3-4 LPM with activity per D Cedric FANG notes    Historical Provider, MD   potassium chloride 10% oral solution Take 30 mL (40 mEq total) by mouth daily  Patient taking differently: Take 20 mEq by mouth 2 (two) times a day. pt takes liquid form and divides dose to equal 40meq daily  Indications: Low Amount of Potassium in the Blood 24   Sarahy Clifford DO   rosuvastatin (CRESTOR) 40 MG tablet TAKE 1 TABLET DAILY 22   Sarahy Clifford DO   sertraline (ZOLOFT) 50 mg tablet Take 1 tablet (50 mg total) by mouth daily 3/23/24 9/19/24  Sarahy Clifford DO   sodium chloride (OCEAN) 0.65 % nasal spray 1 spray into each nostril every hour as needed for congestion 3/11/24   Sarahy Cliffrod DO   Ascorbic Acid (vitamin C) 1000 MG tablet Take 1,000 mg by mouth daily  Patient not taking: No sig reported  10/15/22  Historical Provider, MD       Allergies:   Allergies   Allergen Reactions    Lisinopril Swelling and Cough    Tetanus Antitoxin Anaphylaxis    Tetanus Toxoid Anaphylaxis and Swelling       Social History:     Marital Status: /Civil Union   Substance Use History:   Social History     Substance and Sexual Activity   Alcohol Use Not Currently     Social History     Tobacco Use   Smoking Status Former    Current packs/day: 0.00    Average packs/day: 2.0 packs/day for 35.5 years (71.0 ttl pk-yrs)    Types: Cigarettes    Start date: 1960    Quit date: 1995    Years since quittin.7   Smokeless Tobacco Never   Tobacco Comments    stopped smoking the day i had a heart attack     Social History     Substance and Sexual Activity   Drug Use Never       Family History:    Pertinent family history reviewed    Physical Exam:     Vitals:   Blood Pressure: 102/81 (24 0800)  Pulse: 93 (24 0800)  Temperature: (!)  97.3 °F (36.3 °C) (03/24/24 0356)  Respirations: 20 (03/24/24 0800)  SpO2: 98 % (03/24/24 0800)    Physical Exam  Vitals and nursing note reviewed.   Constitutional:       General: He is not in acute distress.     Appearance: He is well-developed.   HENT:      Head: Normocephalic and atraumatic.   Eyes:      Conjunctiva/sclera: Conjunctivae normal.   Cardiovascular:      Rate and Rhythm: Normal rate and regular rhythm.      Heart sounds: No murmur heard.  Pulmonary:      Effort: Pulmonary effort is normal. No respiratory distress.      Breath sounds: Normal breath sounds.   Abdominal:      Palpations: Abdomen is soft.      Tenderness: There is no abdominal tenderness.   Musculoskeletal:         General: No swelling.      Cervical back: Neck supple.   Skin:     General: Skin is warm and dry.      Capillary Refill: Capillary refill takes less than 2 seconds.   Neurological:      Mental Status: He is alert.   Psychiatric:         Mood and Affect: Mood normal.          Additional Data:     Lab Results: I have reviewed pertinent results     Results from last 7 days   Lab Units 03/24/24  0418   WBC Thousand/uL 11.21*   HEMOGLOBIN g/dL 14.7   HEMATOCRIT % 47.9   PLATELETS Thousands/uL 136*   NEUTROS PCT % 79*   LYMPHS PCT % 9*   MONOS PCT % 12   EOS PCT % 0     Results from last 7 days   Lab Units 03/24/24  0418   SODIUM mmol/L 135   POTASSIUM mmol/L 5.0   CHLORIDE mmol/L 95*   CO2 mmol/L 28   BUN mg/dL 26*   CREATININE mg/dL 1.85*   ANION GAP mmol/L 12   CALCIUM mg/dL 9.2   ALBUMIN g/dL 3.8   TOTAL BILIRUBIN mg/dL 1.57*   ALK PHOS U/L 134*   ALT U/L 329*   AST U/L 430*   GLUCOSE RANDOM mg/dL 113                       Imaging: I have reviewed pertinent imaging     US right upper quadrant   Final Result by Tammy Logan MD (03/24 0855)      Gallstones and gallbladder sludge. Mild gallbladder wall thickening may be related to ascites and adjacent liver disease.      Findings suggest cirrhosis secondary to passive hepatic  congestion from right heart disease.      Small ascites and right pleural effusion.      Small echogenic left lobe liver nodule, statistically likely a benign hemangioma or focal fatty deposition, especially if no known history of malignancy. Can consider follow-up ultrasound in 6 to 12 months.      Workstation performed: [id number]         CT chest abdomen pelvis wo contrast   Final Result by Cee Markham MD (03/24 0718)      COPD with advanced emphysema. There is some nonobstructing material within the posterior aspect of the distal trachea extending to the darrius and into the proximal right mainstem bronchus, likely secretions versus aspirated material. Redemonstrated right    basilar atelectasis with evidence of previously aspirated hyperdense material, similar to the prior studies. There is a moderate size right pleural effusion.      The surface contour of the liver appears slightly nodular. Clinical and laboratory correlation regarding the possibility of early cirrhosis is suggested.      There is a small amount of ascites.      Body wall edema.      The prostate causes some mild nodular appearing indentation upon the base of the urinary bladder. Clinical and laboratory (PSA) correlation recommended.      Other nonemergent findings as above.      The study was marked in EPIC for immediate notification.                  Workstation performed: [id number]         XR chest 1 view portable   Final Result by Amaury Jhonson DO (03/24 1101)      Mild right basilar opacity, likely representing atelectasis, pneumonia cannot be excluded in the appropriate clinical setting.      Flattening of the right diaphragm suspicious for subpulmonic pleural effusion.      Resident: ELLA REID I, the attending radiologist, have reviewed the images and agree with the final report above.      Workstation performed: [id number]         IR IN-Patient Thoracentesis    (Results Pending)       EKG, Pathology, and  Other Studies Reviewed on Admission:   EKG: NSR    Allscripts / Epic Records Reviewed    ** Please Note: This note has been constructed using a voice recognition system. **

## 2024-03-24 NOTE — ASSESSMENT & PLAN NOTE
Does not appear to be in acute exacerbation and symptoms of shortness of breath more likely related to volume overload    Continue home inhalers  Respiratory protocol

## 2024-03-24 NOTE — ED PROVIDER NOTES
Chief Complaint   Patient presents with    Shortness of Breath     SOB since yesterday     History of Present Illness and Review of Systems   This is a 78 y.o. male with PMH notable for CHF, COPD, CAD, chronic respiratory failure on 2L  coming in today with complaint of shortness of breath.  Patient presents with his family who assist in providing history.  They state for the last day has been having worsening dyspnea requiring up to 3L NC at home (normally on 1-2L).  Denies any new leg swelling or weight gain.  He has been compliant with his diuretic therapy.  No fevers or infectious symptoms.  States he is not having any chest pain, headaches, visual changes, abdominal pain.  No history of DVT or PE.  He was recently hospitalized for CHEYANNE and still has some decreased urine output but otherwise is doing okay.  No feelings of palpitations or episodes of syncope or otherwise.  Denies any falls or traumatic injuries.  No other symptoms currently.    - No language barrier.     No other complaints for this encounter.    Remainder of ROS Reviewed and Non-Pertinent    Past Medical, Past Surgical History:    has a past medical history of Abnormal ECG (2021 OR 2022), Alcoholism ([facility]) (1984), Arthritis (2008), Bilateral carotid artery stenosis, Callus, Cancer ([facility]), Chronic diastolic heart failure ([facility]), Chronic ischemic heart disease, Chronic kidney disease, Colon polyp, COPD (chronic obstructive pulmonary disease) ([facility]), Coronary artery disease, COVID (11/2021), COVID (12/01/2023), CPAP (continuous positive airway pressure) dependence, Disease of thyroid gland (2017), Emphysema of lung ([facility]) (1995), Hearing loss, History of transfusion (1995), Hyperlipidemia, Hypertension, Intracranial aneurysm (04/25/2023), Lung nodule (2023), MI (myocardial infarction) ([facility]) (1995), Myocardial infarction ([facility]) (1995), Pneumonia, Pneumothorax (02/20/2023), Prostate cancer ([facility]), RSV (respiratory syncytial virus infection) (10/2022), S/P  carotid endarterectomy, Shortness of breath, Sleep apnea, Sleep apnea, obstructive, and Stented coronary artery.   has a past surgical history that includes Colonoscopy; Skin cancer excision (); Eye surgery; Cardiac catheterization (2021); Coronary angioplasty with stent (); Coronary angioplasty with stent (); Coronary angioplasty with stent (); Appendectomy; pr neuroplasty &/transposition ulnar nerve elbow (Left, 2022); pr neuroplasty &/transpos median nrv carpal tunne (Left, 2022); pr neuroplasty &/transposition ulnar nerve wrist (Left, 2022); pr bx prostate strtctc saturation sampling img gid (N/A, 2022); Cataract extraction, bilateral (Bilateral); and Tonsillectomy (no).     Allergies:     Allergies   Allergen Reactions    Lisinopril Swelling and Cough    Tetanus Antitoxin Anaphylaxis    Tetanus Toxoid Anaphylaxis and Swelling       Social and Family History:     Social History     Substance and Sexual Activity   Alcohol Use Not Currently     Social History     Tobacco Use   Smoking Status Former    Current packs/day: 0.00    Average packs/day: 2.0 packs/day for 35.5 years (71.0 ttl pk-yrs)    Types: Cigarettes    Start date: 1960    Quit date: 1995    Years since quittin.7   Smokeless Tobacco Never   Tobacco Comments    stopped smoking the day i had a heart attack     Social History     Substance and Sexual Activity   Drug Use Never       Physical Examination     Vitals:    24 1115 24 1130 24 1200 24 1206   BP:  119/64     BP Location:       Pulse: 92 90 (!) 37 (!) 0   Resp: 16 (!) 26  (!) 0   Temp:       SpO2: 92% 92% 92%        Physical Exam  Vitals and nursing note reviewed.   Constitutional:       General: He is not in acute distress.     Appearance: He is well-developed. He is ill-appearing.   HENT:      Head: Normocephalic and atraumatic.   Eyes:      Extraocular Movements: Extraocular movements intact.       Conjunctiva/sclera: Conjunctivae normal.   Cardiovascular:      Rate and Rhythm: Normal rate and regular rhythm.      Heart sounds: No murmur heard.  Pulmonary:      Effort: Pulmonary effort is normal. No respiratory distress.      Breath sounds: Examination of the right-lower field reveals decreased breath sounds and rales. Examination of the left-lower field reveals decreased breath sounds and rales. Decreased breath sounds and rales present.   Abdominal:      Palpations: Abdomen is soft.      Tenderness: There is no abdominal tenderness.   Musculoskeletal:         General: No swelling. Normal range of motion.      Cervical back: Normal range of motion and neck supple.      Right lower leg: Edema present.      Left lower leg: Edema present.   Skin:     General: Skin is warm and dry.      Capillary Refill: Capillary refill takes less than 2 seconds.   Neurological:      General: No focal deficit present.      Mental Status: He is alert.   Psychiatric:         Mood and Affect: Mood normal.           Procedures   Procedures      MDM:   Medical Decision Making  Seun Alva is a 78 y.o. who presents with complaints of dyspnea    Vital signs are stable, physical exam shows the patient is chronically ill-appearing, however nontoxic and in no acute distress, heart sounds regular, lungs exhibit diminished breath sounds at the bases with trace rales, no reproducible abdominal tenderness, he is well-perfused, has 1+ peripheral edema bilaterally, no neurodeficits, no evidence of rash or eccymoses    Ddx: Clinically concerning for CHF exacerbation versus COPD.  Will evaluate for ACS..  Will evaluate for pleural effusion as well.  Does not appear consistent with infectious etiology.  Also considered DVT/PE however also does not appear consistent with his presentation.    Plan: Workup will include CBC, CMP, EKG, trop, chest x-ray, DuoNeb. Will monitor closely and reassess.    Reassessment/Disposition: Workup notable for  elevated liver enzymes and as a result is broadened workup to evaluate for cholecystitis vs choledocolithiasis and other acute pulmonary pathology including evaluation of his effusion. Discussed all of these results with the family who was agreeable and ultimately discussed his case with SLIM who agreed to admission and further care.    Amount and/or Complexity of Data Reviewed  Labs: ordered. Decision-making details documented in ED Course.  Radiology: ordered.    Risk  Prescription drug management.        - Reviewed relevant past office visits/hospitalizations/procedures  -Obtained pertinent history that influenced decision making from the patient and family    ED Course as of 24 1419   Sun Mar 24, 2024   0413 My EKG read: NSR, no ST or T wave abnormalities, normal intervals, normal axis, no evidence of ischemia     0438 Calcium: 9.2   0438 AST(!): 430   0438 ALT(!): 329      Final Dispo   Final Diagnosis:  1. Acute on chronic combined systolic (congestive) and diastolic (congestive) heart failure (HCC)    2. Elevated LFTs    3. CHF (congestive heart failure) (HCC)    4. Pleural effusion    5. Renal failure      Time reflects when diagnosis was documented in both MDM as applicable and the Disposition within this note       Time User Action Codes Description Comment    3/24/2024 10:58 AM Ahmad, Karel Add [R79.89] Elevated LFTs     3/24/2024 10:58 AM Ahmad, Karel Add [I50.9] CHF (congestive heart failure) (HCC)     3/24/2024 10:58 AM Ahmad, Karel Add [J90] Pleural effusion     3/24/2024 11:13 AM Brooks Virk Modify [R79.89] Elevated LFTs     3/24/2024 11:13 AM Brooks Virk Add [I50.43] Acute on chronic combined systolic (congestive) and diastolic (congestive) heart failure (HCC)     3/24/2024 11:15 AM Ahmad, Karel Add [N19] Renal failure           ED Disposition       ED Disposition       Condition   --    Date/Time   Sun Mar 24, 2024  2:11 PM    Comment   Case was discussed with Dr. virk and the  patient's admission status was agreed to be Admission Status:INPT status to the service of Dr. virk .               Follow-up Information    None       Medications   furosemide (LASIX) injection 60 mg (has no administration in time range)   ALPRAZolam (XANAX) tablet 0.25 mg (has no administration in time range)   aspirin chewable tablet 81 mg (has no administration in time range)   budesonide (PULMICORT) inhalation solution 0.5 mg (has no administration in time range)   Empagliflozin (JARDIANCE) tablet 10 mg (has no administration in time range)   famotidine (PEPCID) tablet 20 mg (has no administration in time range)   fluticasone (FLONASE) 50 mcg/act nasal spray 1 spray (has no administration in time range)   formoterol (PERFOROMIST) nebulizer solution 20 mcg (has no administration in time range)   magnesium Oxide (MAG-OX) tablet 400 mg (has no administration in time range)   methocarbamol (ROBAXIN) tablet 500 mg (has no administration in time range)   metoprolol succinate (TOPROL-XL) 24 hr tablet 12.5 mg (has no administration in time range)   midodrine (PROAMATINE) tablet 5 mg (has no administration in time range)   pantoprazole (PROTONIX) EC tablet 40 mg (has no administration in time range)   potassium chloride oral solution 40 mEq (has no administration in time range)   atorvastatin (LIPITOR) tablet 80 mg (has no administration in time range)   sertraline (ZOLOFT) tablet 50 mg (has no administration in time range)   ondansetron (ZOFRAN) injection 4 mg (has no administration in time range)   furosemide (LASIX) injection 40 mg (has no administration in time range)   heparin (porcine) subcutaneous injection 5,000 Units (has no administration in time range)   acetaminophen (TYLENOL) tablet 650 mg (has no administration in time range)   oxyCODONE (ROXICODONE) IR tablet 5 mg (has no administration in time range)   oxyCODONE (ROXICODONE) immediate release tablet 10 mg (has no administration in time range)    NOREPINEPHRINE 4 MG  ML NSS (CMPD ORDER) infusion (0 mcg/min Intravenous Stopped 3/24/24 1206)   sodium bicarbonate 8.4 % injection 50 mEq (50 mEq Intravenous Given by Other 3/24/24 1155)       Risk Stratification Tools                Orders Placed This Encounter   Procedures    XR chest 1 view portable    CT chest abdomen pelvis wo contrast    US right upper quadrant    IR IN-Patient Thoracentesis    CBC and differential    Comprehensive metabolic panel    HS Troponin 0hr (reflex protocol)    B-Type Natriuretic Peptide(BNP)    HS Troponin I 2hr    HS Troponin I 4hr    CBC and differential    Basic metabolic panel    Magnesium    TSH, 3rd generation    Platelet count    Diet Cardiovascular; Cardiac; Fluid Restriction 1800 ML    Insert peripheral IV    Vital Signs per unit routine    Up as tolerated    Strict I/O    Nursing Communication Measure urine output for 6 hours after first diuretic dose given. If urine output is less than 900 ml, call provider for additional diuretic doses.    Daily weights- Use standing scale to weigh patient    Provide Patient with Heart Failure Education    Nursing communication ECG 12 lead with chest pain or ST segment change and notify MD. Place an ECG order if performed.    24 Hour Telemetry Monitoring    Apply SCD or Foot pumps    Level 3 - DNAR (Do Not Attempt Resuscitation) and DNI (Do Not Intubate)    Inpatient Consult to Nutrition Services    Inpatient consult to Case Management    Cpap    Respiratory Protocol    ECG 12 lead    Inpatient Admission    Discharge patient       Labs:     Labs Reviewed   CBC AND DIFFERENTIAL - Abnormal       Result Value Ref Range Status    WBC 11.21 (*) 4.31 - 10.16 Thousand/uL Final    RBC 4.74  3.88 - 5.62 Million/uL Final    Hemoglobin 14.7  12.0 - 17.0 g/dL Final    Hematocrit 47.9  36.5 - 49.3 % Final     (*) 82 - 98 fL Final    MCH 31.0  26.8 - 34.3 pg Final    MCHC 30.7 (*) 31.4 - 37.4 g/dL Final    RDW 16.2 (*) 11.6 - 15.1 %  Final    MPV 12.1  8.9 - 12.7 fL Final    Platelets 136 (*) 149 - 390 Thousands/uL Final    nRBC 0  /100 WBCs Final    Neutrophils Relative 79 (*) 43 - 75 % Final    Immature Grans % 0  0 - 2 % Final    Lymphocytes Relative 9 (*) 14 - 44 % Final    Monocytes Relative 12  4 - 12 % Final    Eosinophils Relative 0  0 - 6 % Final    Basophils Relative 0  0 - 1 % Final    Neutrophils Absolute 8.81 (*) 1.85 - 7.62 Thousands/µL Final    Absolute Immature Grans 0.04  0.00 - 0.20 Thousand/uL Final    Absolute Lymphocytes 1.00  0.60 - 4.47 Thousands/µL Final    Absolute Monocytes 1.29 (*) 0.17 - 1.22 Thousand/µL Final    Eosinophils Absolute 0.02  0.00 - 0.61 Thousand/µL Final    Basophils Absolute 0.05  0.00 - 0.10 Thousands/µL Final   COMPREHENSIVE METABOLIC PANEL - Abnormal    Sodium 135  135 - 147 mmol/L Final    Potassium 5.0  3.5 - 5.3 mmol/L Final    Chloride 95 (*) 96 - 108 mmol/L Final    CO2 28  21 - 32 mmol/L Final    ANION GAP 12  4 - 13 mmol/L Final    BUN 26 (*) 5 - 25 mg/dL Final    Creatinine 1.85 (*) 0.60 - 1.30 mg/dL Final    Comment: Standardized to IDMS reference method    Glucose 113  65 - 140 mg/dL Final    Comment: If the patient is fasting, the ADA then defines impaired fasting glucose as > 100 mg/dL and diabetes as > or equal to 123 mg/dL.    Calcium 9.2  8.4 - 10.2 mg/dL Final     (*) 13 - 39 U/L Final     (*) 7 - 52 U/L Final    Comment: Specimen collection should occur prior to Sulfasalazine administration due to the potential for falsely depressed results.     Alkaline Phosphatase 134 (*) 34 - 104 U/L Final    Total Protein 6.0 (*) 6.4 - 8.4 g/dL Final    Albumin 3.8  3.5 - 5.0 g/dL Final    Total Bilirubin 1.57 (*) 0.20 - 1.00 mg/dL Final    Comment: Use of this assay is not recommended for patients undergoing treatment with eltrombopag due to the potential for falsely elevated results.  N-acetyl-p-benzoquinone imine (metabolite of Acetaminophen) will generate erroneously low  "results in samples for patients that have taken an overdose of Acetaminophen.    eGFR 33  ml/min/1.73sq m Final    Narrative:     National Kid[employer] guidelines for Chronic Kidney Disease (CKD):     Stage 1 with normal or high GFR (GFR > 90 mL/min/1.73 square meters)    Stage 2 Mild CKD (GFR = 60-89 mL/min/1.73 square meters)    Stage 3A Moderate CKD (GFR = 45-59 mL/min/1.73 square meters)    Stage 3B Moderate CKD (GFR = 30-44 mL/min/1.73 square meters)    Stage 4 Severe CKD (GFR = 15-29 mL/min/1.73 square meters)    Stage 5 End Stage CKD (GFR <15 mL/min/1.73 square meters)  Note: GFR calculation is accurate only with a steady state creatinine   B-TYPE NATRIURETIC PEPTIDE (BNP) - Abnormal    BNP >4,700 (*) 0 - 100 pg/mL Final    Narrative:     Original value 4,869   HS TROPONIN I 0HR - Normal    hs TnI 0hr 44  \"Refer to ACS Flowchart\"- see link ng/L Final    Comment:                                              Initial (time 0) result  If >=50 ng/L, Myocardial injury suggested ;  Type of myocardial injury and treatment strategy  to be determined.  If 5-49 ng/L, a delta result at 2 hours and or 4 hours will be needed to further evaluate.  If <4 ng/L, and chest pain has been >3 hours since onset, patient may qualify for discharge based on the HEART score in the ED.  If <5 ng/L and <3hours since onset of chest pain, a delta result at 2 hours will be needed to further evaluate.    HS Troponin 99th Percentile URL of a Health Population=12 ng/L with a 95% Confidence Interval of 8-18 ng/L.    Second Troponin (time 2 hours)  If calculated delta >= 20 ng/L,  Myocardial injury suggested ; Type of myocardial injury and treatment strategy to be determined.  If 5-49 ng/L and the calculated delta is 5-19 ng/L, consult medical service for evaluation.  Continue evaluation for ischemia on ecg and other possible etiology and repeat hs troponin at 4 hours.  If delta is <5 ng/L at 2 hours, consider discharge based on " "risk stratification via the HEART score (if in ED), or LARON risk score in IP/Observation.    HS Troponin 99th Percentile URL of a Health Population=12 ng/L with a 95% Confidence Interval of 8-18 ng/L.   HS TROPONIN I 2HR - Normal    hs TnI 2hr 37  \"Refer to ACS Flowchart\"- see link ng/L Final    Comment:                                              Initial (time 0) result  If >=50 ng/L, Myocardial injury suggested ;  Type of myocardial injury and treatment strategy  to be determined.  If 5-49 ng/L, a delta result at 2 hours and or 4 hours will be needed to further evaluate.  If <4 ng/L, and chest pain has been >3 hours since onset, patient may qualify for discharge based on the HEART score in the ED.  If <5 ng/L and <3hours since onset of chest pain, a delta result at 2 hours will be needed to further evaluate.    HS Troponin 99th Percentile URL of a Health Population=12 ng/L with a 95% Confidence Interval of 8-18 ng/L.    Second Troponin (time 2 hours)  If calculated delta >= 20 ng/L,  Myocardial injury suggested ; Type of myocardial injury and treatment strategy to be determined.  If 5-49 ng/L and the calculated delta is 5-19 ng/L, consult medical service for evaluation.  Continue evaluation for ischemia on ecg and other possible etiology and repeat hs troponin at 4 hours.  If delta is <5 ng/L at 2 hours, consider discharge based on risk stratification via the HEART score (if in ED), or LARON risk score in IP/Observation.    HS Troponin 99th Percentile URL of a Health Population=12 ng/L with a 95% Confidence Interval of 8-18 ng/L.    Delta 2hr hsTnI -7  <20 ng/L Final   POCT GLUCOSE - Normal    POC Glucose 118  65 - 140 mg/dl Final   HS TROPONIN I 4HR   PLATELET COUNT       Imaging:     US right upper quadrant   Final Result by Tammy Logan MD (03/24 0855)      Gallstones and gallbladder sludge. Mild gallbladder wall thickening may be related to ascites and adjacent liver disease.      Findings suggest cirrhosis " secondary to passive hepatic congestion from right heart disease.      Small ascites and right pleural effusion.      Small echogenic left lobe liver nodule, statistically likely a benign hemangioma or focal fatty deposition, especially if no known history of malignancy. Can consider follow-up ultrasound in 6 to 12 months.      Workstation performed: [id number]         CT chest abdomen pelvis wo contrast   Final Result by Cee Markham MD (03/24 0718)      COPD with advanced emphysema. There is some nonobstructing material within the posterior aspect of the distal trachea extending to the darrius and into the proximal right mainstem bronchus, likely secretions versus aspirated material. Redemonstrated right    basilar atelectasis with evidence of previously aspirated hyperdense material, similar to the prior studies. There is a moderate size right pleural effusion.      The surface contour of the liver appears slightly nodular. Clinical and laboratory correlation regarding the possibility of early cirrhosis is suggested.      There is a small amount of ascites.      Body wall edema.      The prostate causes some mild nodular appearing indentation upon the base of the urinary bladder. Clinical and laboratory (PSA) correlation recommended.      Other nonemergent findings as above.      The study was marked in EPIC for immediate notification.                  Workstation performed: [id number]         XR chest 1 view portable   Final Result by Amaury Johnson DO (03/24 1101)      Mild right basilar opacity, likely representing atelectasis, pneumonia cannot be excluded in the appropriate clinical setting.      Flattening of the right diaphragm suspicious for subpulmonic pleural effusion.      Resident: ELLA REID I, the attending radiologist, have reviewed the images and agree with the final report above.      Workstation performed: [id number]         IR IN-Patient Thoracentesis    (Results Pending)  "     All details of the evaluation and treatment plan were made clear and additionally all questions and concerns were addressed while under my care.    Portions of the record may have been created with voice recognition software. Occasional wrong word or \"sound a like\" substitutions may have occurred due to the inherent limitations of voice recognition software. Read the chart carefully and recognize, using context, where substitutions have occurred.    The attending physician physically available and evaluated the above patient alongside myself.      Luís Wright MD  03/24/24 8233    "

## 2024-03-25 VITALS
SYSTOLIC BLOOD PRESSURE: 108 MMHG | HEART RATE: 92 BPM | DIASTOLIC BLOOD PRESSURE: 50 MMHG | OXYGEN SATURATION: 96 % | RESPIRATION RATE: 20 BRPM | TEMPERATURE: 97.2 F

## 2024-03-25 NOTE — UTILIZATION REVIEW
Initial Clinical Review    Admission: Date/Time/Statement:   Admission Orders (From admission, onward)       Ordered        03/24/24 1113  Inpatient Admission  Once                          Orders Placed This Encounter   Procedures    Inpatient Admission     Standing Status:   Standing     Number of Occurrences:   1     Order Specific Question:   Level of Care     Answer:   Med Surg [16]     Order Specific Question:   Estimated length of stay     Answer:   More than 2 Midnights     Order Specific Question:   Certification     Answer:   I certify that inpatient services are medically necessary for this patient for a duration of greater than two midnights. See H&P and MD Progress Notes for additional information about the patient's course of treatment.     ED Arrival Information       Expected   -    Arrival   3/24/2024 03:45    Acuity   Urgent              Means of arrival   Wheelchair    Escorted by   Spouse    Service   Hospitalist    Admission type   Emergency              Arrival complaint   breating problems             Chief Complaint   Patient presents with    Shortness of Breath     SOB since yesterday       Initial Presentation: 78 y.o. male  with a past medical history significant for HFrEF with ejection fraction of 35%, COPD, stage IIIb CKD, hyperlipidemia, GERD, LAMBERTO on CPAP who presents with complaints of shortness of breath.  The patient utilizes oxygen chronically 2-3 L of nasal cannula supplemental O2 at baseline. In the ED, the patient was found to be saturating well on his baseline O2 requirement. Plan: Inpatient admission for evaluation and treatment of acute on chronic CHF, transaminitis, COPD, stage 3b CKD, GERD, depression, HLD: IV lasix 40 mg bid, continue home BB and SGLT-2 inhibitor, sodium and fluid restriction, telemetry, continue KCl, trend CMP, wean O2 as tolerated, continue H2 blocker and PPI, statin.     Very shortly after admission to the hospital, while the patient was still in the ED,  the patient became acutely altered. The patient was exhibiting agonal breathing at the time of evaluation.  The patient's heart rate began to decrease and the patient was given 2 amps of bicarb. The patient appeared mottled and very sick and appeared as if he was going to decompensate quickly and possibly pass away. While the critical care team provided support and treatment to the patient, I called the patient's daughter Nedra to notify her of her fathers condition. Shortly after the phone call, the critical care and emergency room staff could not obtain a pulse and the patient did not respond to noxious stimuli and his pupils were fixed. At the time of the patient's death the patient was DNR/DNI. I confirmed this with the patient's daughter Nedra via telephone prior to his death. I believe the patient went into flash pulmonary edema which led to his demise. Time of death 12:06 pm.     ED Triage Vitals [03/24/24 0356]   Temperature Pulse Respirations Blood Pressure SpO2   (!) 97.3 °F (36.3 °C) 96 18 103/64 90 %      Temp src Heart Rate Source Patient Position - Orthostatic VS BP Location FiO2 (%)   -- Monitor Lying Left arm --      Pain Score       --          Wt Readings from Last 1 Encounters:   03/20/24 69.9 kg (154 lb)     Additional Vital Signs:     Date/Time Temp Pulse Resp BP MAP (mmHg) SpO2 Calculated FIO2 (%) - Nasal Cannula Nasal Cannula O2 Flow Rate (L/min) O2 Device   03/24/24 1206 -- 0 Abnormal  0 Abnormal  -- -- -- -- -- --   03/24/24 1200 -- 37 Abnormal  -- -- -- 92 % -- -- --   03/24/24 1130 -- 90 26 Abnormal  119/64 85 92 % -- -- --   03/24/24 1115 -- 92 16 -- -- 92 % -- -- --   03/24/24 1100 -- 92 16 106/65 81 93 % -- -- --   03/24/24 1000 -- 91 15 113/74 89 96 % -- -- --   03/24/24 0900 -- 91 18 113/70 85 98 % -- -- --   03/24/24 0800 -- 93 20 102/81 88 98 % -- -- --   03/24/24 0700 -- 92 21 101/70 77 97 % -- -- --   03/24/24 0543 -- 93 17 108/72 80 95 % 32 3 L/min Nasal cannula   03/24/24  0415 -- 93 18 98/62 76 94 % 32 3 L/min Nasal cannula   03/24/24 0413 -- -- -- -- -- -- -- -- Nasal cannula     Pertinent Labs/Diagnostic Test Results:   US right upper quadrant   Final Result by Tammy Logan MD (03/24 0855)      Gallstones and gallbladder sludge. Mild gallbladder wall thickening may be related to ascites and adjacent liver disease.      Findings suggest cirrhosis secondary to passive hepatic congestion from right heart disease.      Small ascites and right pleural effusion.      Small echogenic left lobe liver nodule, statistically likely a benign hemangioma or focal fatty deposition, especially if no known history of malignancy. Can consider follow-up ultrasound in 6 to 12 months.      Workstation performed: [id number]         CT chest abdomen pelvis wo contrast   Final Result by Cee Markham MD (03/24 0718)      COPD with advanced emphysema. There is some nonobstructing material within the posterior aspect of the distal trachea extending to the darrius and into the proximal right mainstem bronchus, likely secretions versus aspirated material. Redemonstrated right    basilar atelectasis with evidence of previously aspirated hyperdense material, similar to the prior studies. There is a moderate size right pleural effusion.      The surface contour of the liver appears slightly nodular. Clinical and laboratory correlation regarding the possibility of early cirrhosis is suggested.      There is a small amount of ascites.      Body wall edema.      The prostate causes some mild nodular appearing indentation upon the base of the urinary bladder. Clinical and laboratory (PSA) correlation recommended.      Other nonemergent findings as above.      The study was marked in EPIC for immediate notification.                  Workstation performed: [id number]         XR chest 1 view portable   Final Result by Amaury Johnson DO (03/24 1101)      Mild right basilar opacity, likely representing  atelectasis, pneumonia cannot be excluded in the appropriate clinical setting.      Flattening of the right diaphragm suspicious for subpulmonic pleural effusion.      Resident: ELLA REID I, the attending radiologist, have reviewed the images and agree with the final report above.      Workstation performed: [id number]               Results from last 7 days   Lab Units 03/24/24  0418 03/20/24  1212   WBC Thousand/uL 11.21* 8.74   HEMOGLOBIN g/dL 14.7 13.5   HEMATOCRIT % 47.9 44.1   PLATELETS Thousands/uL 136* 153   NEUTROS ABS Thousands/µL 8.81* 7.34         Results from last 7 days   Lab Units 03/24/24  0418 03/20/24  1212   SODIUM mmol/L 135 141   POTASSIUM mmol/L 5.0 3.4*   CHLORIDE mmol/L 95* 98   CO2 mmol/L 28 35*   ANION GAP mmol/L 12 8   BUN mg/dL 26* 24   CREATININE mg/dL 1.85* 1.55*   EGFR ml/min/1.73sq m 33 41   CALCIUM mg/dL 9.2 8.7   MAGNESIUM mg/dL  --  1.8*     Results from last 7 days   Lab Units 03/24/24  0418 03/20/24  1212   AST U/L 430* 23   ALT U/L 329* 31   ALK PHOS U/L 134* 82   TOTAL PROTEIN g/dL 6.0* 5.2*   ALBUMIN g/dL 3.8 3.4*   TOTAL BILIRUBIN mg/dL 1.57* 1.09*     Results from last 7 days   Lab Units 03/24/24  1150   POC GLUCOSE mg/dl 118     Results from last 7 days   Lab Units 03/24/24  0418   GLUCOSE RANDOM mg/dL 113         Results from last 7 days   Lab Units 03/24/24  0618 03/24/24  0418   HS TNI 0HR ng/L  --  44   HS TNI 2HR ng/L 37  --    HSTNI D2 ng/L -7  --            Results from last 7 days   Lab Units 03/24/24  0418   BNP pg/mL >4,700*           Results from last 7 days   Lab Units 03/21/24  1656 03/18/24  1126   CLARITY UA  Clear Hazy   COLOR UA  Yellow Yellow   SPEC GRAV UA  1.015 1.020   PH UA  5.5 6.0   GLUCOSE UA mg/dl 2+* 1+*   KETONES UA mg/dl Negative Negative   BLOOD UA  Negative 1+*   PROTEIN UA mg/dl 1+* 1+*   NITRITE UA  Negative Negative   BILIRUBIN UA  Negative Negative   UROBILINOGEN UA E.U./dl 0.2 0.2   LEUKOCYTES UA  Negative Negative   WBC UA  /hpf 0-1 0-1   RBC UA /hpf None Seen 0-1   BACTERIA UA /hpf None Seen Occasional   EPITHELIAL CELLS WET PREP /hpf Occasional Occasional   MUCUS THREADS  Occasional*  --            Results from last 7 days   Lab Units 03/21/24  1656   URINE CULTURE  No Growth <1000 cfu/mL         ED Treatment:   Medication Administration from 03/24/2024 0345 to 03/25/2024 0916         Date/Time Order Dose Route Action     03/24/2024 0417 EDT ipratropium-albuterol (DUO-NEB) 0.5-2.5 mg/3 mL inhalation solution 3 mL 3 mL Nebulization Given     03/24/2024 1156 EDT NOREPINEPHRINE 4 MG  ML NSS (CMPD ORDER) infusion 20 mcg/min Intravenous New Bag     03/24/2024 1155 EDT sodium bicarbonate 8.4 % injection 50 mEq 50 mEq Intravenous Given by Other          Past Medical History:   Diagnosis Date    Abnormal ECG 2021 OR 2022    Alcoholism (Formerly [facility]) 1984    sober 38 years    Arthritis 2008    beginning    Bilateral carotid artery stenosis     Callus     Cancer (Formerly [facility])     skin    Chronic diastolic heart failure ([facility])     Chronic ischemic heart disease     Chronic kidney disease     stage 3    Colon polyp     COPD (chronic obstructive pulmonary disease) ([facility])     Coronary artery disease     hx stents, MI, PCI    COVID 11/2021    COVID 12/01/2023    CPAP (continuous positive airway pressure) dependence     Disease of thyroid gland 2017    nodules    Emphysema of lung ([facility]) 1995    started    Hearing loss     History of transfusion 1995    Hyperlipidemia     Hypertension     Intracranial aneurysm 04/25/2023    Lung nodule 2023    x rays    MI (myocardial infarction) ([facility]) 1995    Myocardial infarction ([facility]) 1995    Pneumonia     Pneumothorax 02/20/2023    collapsed lung    Prostate cancer ([facility])     RSV (respiratory syncytial virus infection) 10/2022    S/P carotid endarterectomy     Shortness of breath     O2 2 l/nc PRN    Sleep apnea     Sleep apnea, obstructive     Stented coronary artery      Present on Admission:   Acute on chronic systolic  congestive heart failure (HCC)   Hyperlipidemia   Depression, recurrent (HCC)   GERD (gastroesophageal reflux disease)   LAMBERTO on CPAP   Stage 3b chronic kidney disease (HCC)   Severe protein-calorie malnutrition (HCC)   Transaminitis   COPD (chronic obstructive pulmonary disease) (HCC)      Admitting Diagnosis: Shortness of breath [R06.02]  Age/Sex: 78 y.o. male  Admission Orders:  Scheduled Medications:  No current facility-administered medications for this encounter.    Continuous IV Infusions:  No current facility-administered medications for this encounter.    PRN Meds:  No current facility-administered medications for this encounter.      None    Network Utilization Review Department  ATTENTION: Please call with any questions or concerns to [phone] and carefully listen to the prompts so that you are directed to the right person. All voicemails are confidential.   For Discharge needs, contact Care Management DC Support Team at [phone] opt. 2  Send all requests for admission clinical reviews, approved or denied determinations and any other requests to dedicated fax number below belonging to the campus where the patient is receiving treatment. List of dedicated fax numbers for the Facilities:  FACILITY NAME UR FAX NUMBER   ADMISSION DENIALS (Administrative/Medical Necessity) [phone]   DISCHARGE SUPPORT TEAM (NETWORK) [phone]   PARENT CHILD HEALTH (Maternity/NICU/Pediatrics) [phone]   [facility] [phone]   [facility] [phone]   [facility] [phone]   [facility] [phone]   [facility] [phone]   General acute hospital [phone]   [facility] [phone]   [facility] [phone]   [facility] [phone]   [facility]  [facility] [phone]   [facility] [phone]   [facility] [phone]

## 2024-03-25 NOTE — ED ATTENDING ATTESTATION
3/24/2024  I, Abdiel Lara MD, saw and evaluated the patient. I have discussed the patient with the resident/non-physician practitioner and agree with the resident's/non-physician practitioner's findings, Plan of Care, and MDM as documented in the resident's/non-physician practitioner's note, except where noted. All available labs and Radiology studies were reviewed.  I was present for key portions of any procedure(s) performed by the resident/non-physician practitioner and I was immediately available to provide assistance.       At this point I agree with the current assessment done in the Emergency Department.  I have conducted an independent evaluation of this patient a history and physical is as follows:    Subjective: Patient is a 78-year-old male with history of CHF, COPD, chronic oxygen requirement of 2 to 3 L that presents for evaluation of dyspnea.  Patient says that he has chronic dyspnea that has been somewhat worse over the past couple days.  He has been taking his Lasix as prescribed and does not have any significant lower extremity edema.  Patient also with vague right-sided abdominal pain that is somewhat chronic but worsened over the past day.  Patient is asymptomatic at the time my initial evaluation.  Only on 1 L nasal cannula.    Objective: Patient in no acute distress.  Lungs are clear bilaterally.  Mild right lower quadrant abdominal tenderness noted.  Trace edema in lower extremities.    Assessment and plan: Patient is a 78-year-old male who presents for evaluation of acute on chronic dyspnea and abdominal pain.  Workup shows elevated BNP and elevated LFTs which is new from most recent evaluation.  CT scan obtained to workup dyspnea and abdominal pain.  Signed out to Dr. Girard pending completion of workup and likely admission.  EKG reviewed and consistent with prior.    ED Course         Critical Care Time  Procedures

## 2024-03-26 LAB
ATRIAL RATE: 32 BPM
ATRIAL RATE: 92 BPM
ATRIAL RATE: 97 BPM
P AXIS: 75 DEGREES
P AXIS: 82 DEGREES
PR INTERVAL: 156 MS
PR INTERVAL: 166 MS
QRS AXIS: 157 DEGREES
QRS AXIS: 89 DEGREES
QRS AXIS: 93 DEGREES
QRSD INTERVAL: 118 MS
QRSD INTERVAL: 90 MS
QRSD INTERVAL: 92 MS
QT INTERVAL: 370 MS
QT INTERVAL: 376 MS
QT INTERVAL: 508 MS
QTC INTERVAL: 429 MS
QTC INTERVAL: 464 MS
QTC INTERVAL: 469 MS
T WAVE AXIS: -2 DEGREES
T WAVE AXIS: 4 DEGREES
T WAVE AXIS: 75 DEGREES
VENTRICULAR RATE: 43 BPM
VENTRICULAR RATE: 92 BPM
VENTRICULAR RATE: 97 BPM

## 2024-03-29 PROCEDURE — G0180 MD CERTIFICATION HHA PATIENT: HCPCS | Performed by: INTERNAL MEDICINE

## 2024-08-06 NOTE — ASSESSMENT & PLAN NOTE
· Patient has had hearing loss since last admission from 4/3/23 to 4/17/23  · Patient is following with ENT  · MRI scheduled for 6/1/23 Plan: Clobetasol 0.05% topical ointment apply to affected area BID x 2 weeks PRN for flares\\n\\nTacrolimus 0.1% topical ointment apply to affected area BID PRN for maintenance \\n\\nLab slip was giving to patient for biologic labs for Skyri[facility] Detail Level: Simple Render In Strict Bullet Format?: No

## (undated) DEVICE — OCCLUSIVE GAUZE STRIP,3% BISMUTH TRIBROMOPHENATE IN PETROLATUM BLEND: Brand: XEROFORM

## (undated) DEVICE — STERILE BETHLEHEM PLASTIC HAND: Brand: CARDINAL HEALTH

## (undated) DEVICE — GLOVE SRG BIOGEL 7

## (undated) DEVICE — GLOVE INDICATOR PI UNDERGLOVE SZ 7 BLUE

## (undated) DEVICE — NEEDLE 25G X 1 1/2

## (undated) DEVICE — SUT VICRYL 3-0 SH 27 IN J416H

## (undated) DEVICE — SUT MONOCRYL 3-0 SH 27 IN Y416H

## (undated) DEVICE — CHLORAPREP HI-LITE 26ML ORANGE

## (undated) DEVICE — PADDING CAST 2IN COTTON STRL

## (undated) DEVICE — ACE WRAP 4 IN UNSTERILE

## (undated) DEVICE — PADDING CAST 4 IN  COTTON STRL

## (undated) DEVICE — SPONGE PVP SCRUB WING STERILE

## (undated) DEVICE — CUFF TOURNIQUET 18 X 4 IN QUICK CONNECT DISP 1 BLADDER

## (undated) DEVICE — GAUZE SPONGES,16 PLY: Brand: CURITY

## (undated) DEVICE — DRAPE SHEET THREE QUARTER

## (undated) DEVICE — ACE WRAP 4 IN STERILE

## (undated) DEVICE — DISPOSABLE EQUIPMENT COVER: Brand: SMALL TOWEL DRAPE

## (undated) DEVICE — INTENDED FOR TISSUE SEPARATION, AND OTHER PROCEDURES THAT REQUIRE A SHARP SURGICAL BLADE TO PUNCTURE OR CUT.: Brand: BARD-PARKER ® CARBON RIB-BACK BLADES

## (undated) DEVICE — KNIFE LIGHT 10,PK: Brand: KNIFELIGHT

## (undated) DEVICE — MINI BLADE ROUND TIP SHARP ON ONE SIDE